# Patient Record
Sex: FEMALE | Race: WHITE | NOT HISPANIC OR LATINO | Employment: OTHER | URBAN - METROPOLITAN AREA
[De-identification: names, ages, dates, MRNs, and addresses within clinical notes are randomized per-mention and may not be internally consistent; named-entity substitution may affect disease eponyms.]

---

## 2017-02-08 RX ORDER — ALBUTEROL SULFATE 0.83 MG/ML
SOLUTION RESPIRATORY (INHALATION)
Qty: 540 ML | Refills: 1 | Status: SHIPPED | OUTPATIENT
Start: 2017-02-08

## 2017-02-22 ENCOUNTER — OFFICE VISIT (OUTPATIENT)
Dept: FAMILY MEDICINE | Facility: CLINIC | Age: 72
End: 2017-02-22
Payer: MEDICARE

## 2017-02-22 ENCOUNTER — LAB VISIT (OUTPATIENT)
Dept: LAB | Facility: HOSPITAL | Age: 72
End: 2017-02-22
Attending: FAMILY MEDICINE
Payer: MEDICARE

## 2017-02-22 VITALS
HEIGHT: 63 IN | SYSTOLIC BLOOD PRESSURE: 110 MMHG | BODY MASS INDEX: 32.54 KG/M2 | RESPIRATION RATE: 16 BRPM | TEMPERATURE: 98 F | DIASTOLIC BLOOD PRESSURE: 70 MMHG | HEART RATE: 46 BPM | WEIGHT: 183.63 LBS | OXYGEN SATURATION: 95 %

## 2017-02-22 DIAGNOSIS — I70.0 ATHEROSCLEROSIS OF AORTA: ICD-10-CM

## 2017-02-22 DIAGNOSIS — R73.9 ELEVATED BLOOD SUGAR: ICD-10-CM

## 2017-02-22 DIAGNOSIS — I25.10 CORONARY ARTERY DISEASE INVOLVING NATIVE CORONARY ARTERY WITHOUT ANGINA PECTORIS, UNSPECIFIED WHETHER NATIVE OR TRANSPLANTED HEART: Chronic | ICD-10-CM

## 2017-02-22 DIAGNOSIS — E78.5 HYPERLIPIDEMIA LDL GOAL <70: ICD-10-CM

## 2017-02-22 DIAGNOSIS — Z12.11 COLON CANCER SCREENING: ICD-10-CM

## 2017-02-22 DIAGNOSIS — G47.33 OSA (OBSTRUCTIVE SLEEP APNEA): Chronic | ICD-10-CM

## 2017-02-22 DIAGNOSIS — I25.2 OLD MYOCARDIAL INFARCTION: ICD-10-CM

## 2017-02-22 DIAGNOSIS — Z00.00 ENCOUNTER FOR PREVENTIVE HEALTH EXAMINATION: Primary | ICD-10-CM

## 2017-02-22 DIAGNOSIS — J44.9 CHRONIC OBSTRUCTIVE PULMONARY DISEASE, UNSPECIFIED COPD TYPE: Chronic | ICD-10-CM

## 2017-02-22 DIAGNOSIS — Z85.3 HISTORY OF BREAST CANCER: ICD-10-CM

## 2017-02-22 DIAGNOSIS — I20.89 STABLE ANGINA: Chronic | ICD-10-CM

## 2017-02-22 DIAGNOSIS — I11.9 BENIGN HYPERTENSIVE HEART DISEASE WITHOUT HEART FAILURE: Chronic | ICD-10-CM

## 2017-02-22 DIAGNOSIS — R00.1 BRADYCARDIA: ICD-10-CM

## 2017-02-22 DIAGNOSIS — M94.9 DISORDER OF CARTILAGE: ICD-10-CM

## 2017-02-22 PROBLEM — R73.03 PREDIABETES: Status: ACTIVE | Noted: 2017-02-22

## 2017-02-22 PROCEDURE — 99499 UNLISTED E&M SERVICE: CPT | Mod: S$GLB,,, | Performed by: PHYSICIAN ASSISTANT

## 2017-02-22 PROCEDURE — G0439 PPPS, SUBSEQ VISIT: HCPCS | Mod: S$GLB,,, | Performed by: PHYSICIAN ASSISTANT

## 2017-02-22 PROCEDURE — 36415 COLL VENOUS BLD VENIPUNCTURE: CPT

## 2017-02-22 PROCEDURE — 99999 PR PBB SHADOW E&M-EST. PATIENT-LVL V: CPT | Mod: PBBFAC,,, | Performed by: PHYSICIAN ASSISTANT

## 2017-02-22 PROCEDURE — 80061 LIPID PANEL: CPT

## 2017-02-22 PROCEDURE — 83036 HEMOGLOBIN GLYCOSYLATED A1C: CPT

## 2017-02-22 NOTE — PROGRESS NOTES
"Ade Castellano presented for a  Medicare AWV and comprehensive Health Risk Assessment today. The following components were reviewed and updated:    · Medical history  · Family History  · Social history  · Allergies and Current Medications  · Health Risk Assessment  · Health Maintenance  · Care Team     ** See Completed Assessments for Annual Wellness Visit within the encounter summary.**       The following assessments were completed:  · Living Situation  · CAGE  · Depression Screening  · Timed Get Up and Go  · Whisper Test  · Cognitive Function Screening    ·   ·   · Nutrition Screening  · ADL Screening  · PAQ Screening    Vitals:    02/22/17 0917   BP: 110/70   Pulse: (!) 46   Resp: 16   Temp: 97.7 °F (36.5 °C)   TempSrc: Oral   SpO2: 95%   Weight: 83.3 kg (183 lb 10.3 oz)   Height: 5' 3" (1.6 m)     Body mass index is 32.53 kg/(m^2).  Physical Exam      Diagnoses and health risks identified today and associated recommendations/orders:    Problem List Items Addressed This Visit     Stable angina (Chronic)    Overview     HRA 2014 nitro SL prn  On Imdur daily.  [facility]          Current Assessment & Plan     The current medical regimen is effective;  continue present plan and medications.  Continue to see Dr. Ortiz as advised.          SUNITHA (obstructive sleep apnea) (Chronic)    Overview     intolerant to mask         Current Assessment & Plan     Did not like her CPAP.   Still sees Dr. Sterling mari. Seeing her 3/6/17         Old myocardial infarction    Overview     HRA 2014         Hyperlipidemia LDL goal <70 (Chronic)    Overview     HRA 2014         Current Assessment & Plan     Due for labs today.   The current medical regimen is effective;  continue present plan and medications.           Relevant Orders    Lipid panel    History of breast cancer    Current Assessment & Plan     Continue to follow up with Dr. Holliday.  Not due for mammo until May.          COPD (chronic obstructive " pulmonary disease) (Chronic)    Overview     Dr. Katz         Current Assessment & Plan     Using ventolin twice a day sometimes.   Started sprivia recently but did not know she was to use it daily. She is aware now and will start to do so. Advised her we do not want her to use her ventolin that often.  She has a follow up with Dr. Katz 3/6/17         CAD (coronary artery disease) (Chronic)    Overview     Dr. Ortiz         Current Assessment & Plan     The current medical regimen is effective;  continue present plan and medications.  Continue to follow up with Dr. Ortiz as directed.  States she is down to half the Imdur and does not use her nitro.          Benign hypertensive heart disease without heart failure (Chronic)    Overview     Dr. Ortiz is Cardiologist          Current Assessment & Plan     The current medical regimen is effective;  continue present plan and medications.         Atherosclerosis of aorta    Overview     CT scan 4/14/13         Current Assessment & Plan     Lipids controlled.   Repeat labs today.   The current medical regimen is effective;  continue present plan and medications.             Other Visit Diagnoses     Encounter for preventive health examination    -  Primary    Elevated blood sugar        Relevant Orders    Hemoglobin A1c    Disorder of cartilage        Relevant Orders    DXA Bone Density Spine And Hip_Axial Skeleton    Colon cancer screening        Relevant Orders    Case request GI: COLONOSCOPY (Completed)    Bradycardia        seen by Dr. Ortiz today who did an EKG and kept her on the same regiment.   Follow up scheduled for May.           Provided Ade with a 5-10 year written screening schedule and personal prevention plan. Recommendations were developed using the USPSTF age appropriate recommendations. Education, counseling, and referrals were provided as needed. After Visit Summary printed and given to patient which includes a list of additional screenings\tests  needed.      Script for zoster given.  ROR to get Dr. Katz info on pneumonia shots.     No Follow-up on file.    OBDULIO Jay

## 2017-02-22 NOTE — MR AVS SNAPSHOT
[facility]  [address]  [address]  [place] [address]  Phone: [phone]  Fax: [phone]                  Ade Castellano   2017 10:00 AM   Office Visit    Description:  Female : 1945   Provider:  OBDULIO Javier   Department:  [facility]           Reason for Visit     Health Risk Assessment           Diagnoses this Visit        Comments    Disorder of cartilage    -  Primary     Prediabetes         Elevated blood sugar         Colon cancer screening         Encounter for preventive health examination                To Do List           Future Appointments        Provider Department Dept Phone    3/8/2017 9:20 AM [facility] DEXA1 [facility] [phone]      Goals (5 Years of Data)     None      [facility]     [facility] Care Line -  Assistance  Registered nurses in the [facility] provide clinical advisement, health education, appointment booking, and other advisory services.  Call for this free service at [phone].             Medications           Message regarding Medications     Verify the changes and/or additions to your medication regime listed below are the same as discussed with your clinician today.  If any of these changes or additions are incorrect, please notify your healthcare provider.             Verify that the below list of medications is an accurate representation of the medications you are currently taking.  If none reported, the list may be blank. If incorrect, please contact your healthcare provider. Carry this list with you in case of emergency.           Current Medications     albuterol (PROVENTIL) 2.5 mg /3 mL (0.083 %) nebulizer solution NEBULIZE 1 vial EVERY 6 HOURS AS NEEDED FOR WHEEZING    albuterol (VENTOLIN HFA) 90 mcg/actuation inhaler INHALE 2 PUFF(S) BY MOUTH EVERY 4 - 6 HOURS AS NEEDED FOR SHORTNESS OF BREATH or WHEEZING    ascorbic acid (VITAMIN C) 500 MG  "tablet Take 500 mg by mouth once daily.    aspirin (ECOTRIN) 325 MG EC tablet Take 325 mg by mouth once daily.    cyanocobalamin (VITAMIN B-12) 1000 MCG tablet Take 100 mcg by mouth once daily.    fish oil-fat acid comb.[id number] 1,200 mg Cap Take 1 tablet by mouth once daily.    isosorbide mononitrate (IMDUR) 60 MG 24 hr tablet     metoprolol tartrate (LOPRESSOR) 25 MG tablet Take 25 mg by mouth 2 (two) times daily.     nitroGLYCERIN (NITROSTAT) 0.4 MG SL tablet 1 TABLET(S) SUBLINGUALLY AS NEEDED NO MORE THAN 3 IN 15 MINUTES    rosuvastatin (CRESTOR) 20 MG tablet Take 1 tablet (20 mg total) by mouth once daily.    SPIRIVA RESPIMAT 2.5 mcg/actuation Mist Inhale 1 puff into the lungs once daily.    vitamin D 1000 units Tab Take 1,000 mg by mouth once daily.           Clinical Reference Information           Your Vitals Were     BP Pulse Temp Resp Height Weight    110/70 46 97.7 °F (36.5 °C) (Oral) 16 5' 3" (1.6 m) 83.3 kg (183 lb 10.3 oz)    SpO2 BMI             95% 32.53 kg/m2         Blood Pressure          Most Recent Value    BP  110/70      Allergies as of 2/22/2017     No Known Allergies      Immunizations Administered on Date of Encounter - 2/22/2017     None      Orders Placed During Today's Visit      Normal Orders This Visit    Case request GI: COLONOSCOPY     Future Labs/Procedures Expected by Expires    DXA Bone Density Spine And Hip_Axial Skeleton  2/22/2017 2/23/2018    Hemoglobin A1c  2/22/2017 2/22/2018      [facility] Sign-Up     Activating your [facility] account is as easy as 1-2-3!     1) Visit [email or web address], select Sign Up Now, enter this activation code and your date of birth, then select Next.  [email or web address]  Expires: 4/8/2017 10:19 AM      2) Create a username and password to use when you visit [facility] in the future and select a security question in case you lose your password and select Next.    3) Enter your e-mail address and click Sign Up!    Additional Information  If you have " questions, please e-mail [email or web address] or call [phone] to talk to our [facility] staff. Remember, [employer] is NOT to be used for urgent needs. For medical emergencies, dial 911.         Instructions      Counseling and Referral of Other Preventative  (Italic type indicates deductible and co-insurance are waived)    Patient Name: Ade Castellano  Today's Date: 2/22/2017      SERVICE LIMITATIONS RECOMMENDATION    Vaccines    · Pneumococcal (once after 65)    · Influenza (annually)    · Hepatitis B (if medium/high risk)    · Prevnar 13      Hepatitis B medium/high risk factors:       - End-stage renal disease       - Hemophiliacs who received Factor VII or         IX concentrates       - Clients of institutions for the mentally             retarded       - Persons who live in the same house as          a HepB carrier       - Homosexual men       - Illicit injectable drug abusers     Pneumococcal: N/A states she got it at Dr. Katz her pulomonologist, a record of release was signed and will try to get copy of this     Influenza: Done, repeat in one year     Hepatitis B: N/A not needed     Prevnar 13: N/A getting records from lung doctor    Mammogram (biennial age 50-74)  Annually (age 40 or over)  Last done May, recommend to repeat every 1 year  years    Pap (up to age 70 and after 70 if unknown history or abnormal study last 10 years)    N/A 70 years old      The USPSTF recommends against screening for cervical cancer in women older than age 65 years who have had adequate prior screening and are not otherwise at high risk for cervical cancer.      Colorectal cancer screening (to age 75)    · Fecal occult blood test (annual)  · Flexible sigmoidoscopy (5y)  · Screening colonoscopy (10y)  · Barium enema   Scheduled, see appointments    Diabetes self-management training (no USPSTF recommendations)  Requires referral by treating physician for patient with diabetes or renal disease. 10 hours of initial DSMT  sessions of no less than 30 minutes each in a continuous 12-month period. 2 hours of follow-up DSMT in subsequent years.  Last done last year. due now, ordered today , recommend to repeat every 6   months    Bone mass measurements (age 65 & older, biennial)  Requires diagnosis related to osteoporosis or estrogen deficiency. Biennial benefit unless patient has history of long-term glucocorticoid  Scheduled, see appointments    Glaucoma screening (no USPSTF recommendation)  Diabetes mellitus, family history   , age 50 or over    American, age 65 or over  Done this year, repeat every year    Medical nutrition therapy for diabetes or renal disease (no recommended schedule)  Requires referral by treating physician for patient with diabetes or renal disease or kidney transplant within the past 3 years.  Can be provided in same year as diabetes self-management training (DSMT), and CMS recommends medical nutrition therapy take place after DSMT. Up to 3 hours for initial year and 2 hours in subsequent years.  N/A wait for A1C    Cardiovascular screening blood tests (every 5 years)  · Fasting lipid panel  Order as a panel if possible  Done this year, repeat every year    Diabetes screening tests (at least every 3 years, Medicare covers annually or at 6-month intervals for prediabetic patients)  · Fasting blood sugar (FBS) or glucose tolerance test (GTT)  Patient must be diagnosed with one of the following:       - Hypertension       - Dyslipidemia       - Obesity (BMI 30kg/m2)       - Previous elevated impaired FBS or GTT       ... or any two of the following:       - Overweight (BMI 25 but <30)       - Family history of diabetes       - Age 65 or older       - History of gestational diabetes or birth of baby weighing more than 9 pounds  Scheduled, see appointments    Abdominal aortic aneurysm screening (once)  · Sonogram   Limited to patients who meet one of the following criteria:       - Men who are  65-75 years old and have smoked more than 100 cigarette in their lifetime       - Anyone with a family history of abdominal aortic aneurysm       - Anyone recommended for screening by the USPSTF  N/A not needed    HIV screening (annually for increased risk patients)  · HIV-1 and HIV-2 by EIA, or GRISELDA, rapid antibody test or oral mucosa transudate  Patients must be at increased risk for HIV infection per USPSTF guidelines or pregnant. Tests covered annually for patient at increased risk or as requested by the patient. Pregnant patients may receive up to 3 tests during pregnancy.  Risks discussed, screening is not recommended    Smoking cessation counseling (up to 8 sessions per year)  Patients must be asymptomatic of tobacco-related conditions to receive as a preventative service.  no longer a smoker    Subsequent annual wellness visit  At least 12 months since last AWV  Return in one year     The following information is provided to all patients.  This information is to help you find resources for any of the problems found today that may be affecting your health:                Living healthy guide: www.[facility].[place].[facility]      Understanding Diabetes: www.diabetes.org      Eating healthy: www.cdc.gov/healthyweight      [facility] home safety checklist: www.cdc.gov/steadi/patient.html      Agency on Aging: www.goea.[place].[facility]      Alcoholics anonymous (AA): www.aa.org      Physical Activity: www.anca.nih.gov/fd4bkgk      Tobacco use: www.quitwithusla.org          Language Assistance Services     ATTENTION: Language assistance services are available, free of charge. Please call [phone].      ATENCIÓN: Si habla español, tiene a montgomery disposición servicios gratuitos de asistencia lingüística. Llame al [phone].     ANGELITO Ý: N?u b?n nói Ti?ng Vi?t, có các d?ch v? h? tr? ngôn ng? mi?n phí dành cho b?n. G?i s? [phone].         [place] [facility] complies with applicable Federal civil rights laws and  does not discriminate on the basis of race, color, national origin, age, disability, or sex.

## 2017-02-22 NOTE — ASSESSMENT & PLAN NOTE
Lipids controlled.   Repeat labs today.   The current medical regimen is effective;  continue present plan and medications.

## 2017-02-22 NOTE — ASSESSMENT & PLAN NOTE
The current medical regimen is effective;  continue present plan and medications.  Continue to follow up with Dr. Ortiz as directed.  States she is down to half the Imdur and does not use her nitro.

## 2017-02-22 NOTE — ASSESSMENT & PLAN NOTE
Due for labs today.   The current medical regimen is effective;  continue present plan and medications.

## 2017-02-22 NOTE — PATIENT INSTRUCTIONS
Counseling and Referral of Other Preventative  (Italic type indicates deductible and co-insurance are waived)    Patient Name: Ade Castellano  Today's Date: 2/22/2017      SERVICE LIMITATIONS RECOMMENDATION    Vaccines    · Pneumococcal (once after 65)    · Influenza (annually)    · Hepatitis B (if medium/high risk)    · Prevnar 13      Hepatitis B medium/high risk factors:       - End-stage renal disease       - Hemophiliacs who received Factor VII or         IX concentrates       - Clients of institutions for the mentally             retarded       - Persons who live in the same house as          a HepB carrier       - Homosexual men       - Illicit injectable drug abusers     Pneumococcal: N/A states she got it at Dr. Katz her pulomonologist, a record of release was signed and will try to get copy of this     Influenza: Done, repeat in one year     Hepatitis B: N/A not needed     Prevnar 13: N/A getting records from lung doctor    Mammogram (biennial age 50-74)  Annually (age 40 or over)  Last done May, recommend to repeat every 1 year  years    Pap (up to age 70 and after 70 if unknown history or abnormal study last 10 years)    N/A 70 years old      The USPSTF recommends against screening for cervical cancer in women older than age 65 years who have had adequate prior screening and are not otherwise at high risk for cervical cancer.      Colorectal cancer screening (to age 75)    · Fecal occult blood test (annual)  · Flexible sigmoidoscopy (5y)  · Screening colonoscopy (10y)  · Barium enema   Scheduled, see appointments    Diabetes self-management training (no USPSTF recommendations)  Requires referral by treating physician for patient with diabetes or renal disease. 10 hours of initial DSMT sessions of no less than 30 minutes each in a continuous 12-month period. 2 hours of follow-up DSMT in subsequent years.  Last done last year. due now, ordered today , recommend to repeat every 6   months    Bone mass  measurements (age 65 & older, biennial)  Requires diagnosis related to osteoporosis or estrogen deficiency. Biennial benefit unless patient has history of long-term glucocorticoid  Scheduled, see appointments    Glaucoma screening (no USPSTF recommendation)  Diabetes mellitus, family history   , age 50 or over    American, age 65 or over  Done this year, repeat every year    Medical nutrition therapy for diabetes or renal disease (no recommended schedule)  Requires referral by treating physician for patient with diabetes or renal disease or kidney transplant within the past 3 years.  Can be provided in same year as diabetes self-management training (DSMT), and CMS recommends medical nutrition therapy take place after DSMT. Up to 3 hours for initial year and 2 hours in subsequent years.  N/A wait for A1C    Cardiovascular screening blood tests (every 5 years)  · Fasting lipid panel  Order as a panel if possible  Done this year, repeat every year    Diabetes screening tests (at least every 3 years, Medicare covers annually or at 6-month intervals for prediabetic patients)  · Fasting blood sugar (FBS) or glucose tolerance test (GTT)  Patient must be diagnosed with one of the following:       - Hypertension       - Dyslipidemia       - Obesity (BMI 30kg/m2)       - Previous elevated impaired FBS or GTT       ... or any two of the following:       - Overweight (BMI 25 but <30)       - Family history of diabetes       - Age 65 or older       - History of gestational diabetes or birth of baby weighing more than 9 pounds  Scheduled, see appointments    Abdominal aortic aneurysm screening (once)  · Sonogram   Limited to patients who meet one of the following criteria:       - Men who are 65-75 years old and have smoked more than 100 cigarette in their lifetime       - Anyone with a family history of abdominal aortic aneurysm       - Anyone recommended for screening by the USPSTF  N/A not needed    HIV  screening (annually for increased risk patients)  · HIV-1 and HIV-2 by EIA, or GRISELDA, rapid antibody test or oral mucosa transudate  Patients must be at increased risk for HIV infection per USPSTF guidelines or pregnant. Tests covered annually for patient at increased risk or as requested by the patient. Pregnant patients may receive up to 3 tests during pregnancy.  Risks discussed, screening is not recommended    Smoking cessation counseling (up to 8 sessions per year)  Patients must be asymptomatic of tobacco-related conditions to receive as a preventative service.  no longer a smoker    Subsequent annual wellness visit  At least 12 months since last AWV  Return in one year     The following information is provided to all patients.  This information is to help you find resources for any of the problems found today that may be affecting your health:                Living healthy guide: www.[facility].[place].[facility]      Understanding Diabetes: www.diabetes.org      Eating healthy: www.cdc.gov/healthyweight      [facility] home safety checklist: www.cdc.gov/steadi/patient.html      Agency on Aging: www.goea.[place].gov      Alcoholics anonymous (AA): www.aa.org      Physical Activity: www.anca.nih.gov/ix7kjjt      Tobacco use: www.quitwithusla.org

## 2017-02-22 NOTE — ASSESSMENT & PLAN NOTE
The current medical regimen is effective;  continue present plan and medications.  Continue to see Dr. Ortiz as advised.

## 2017-02-23 LAB
ESTIMATED AVG GLUCOSE: 137 MG/DL
HBA1C MFR BLD HPLC: 6.4 %

## 2017-03-02 ENCOUNTER — TELEPHONE (OUTPATIENT)
Dept: FAMILY MEDICINE | Facility: CLINIC | Age: 72
End: 2017-03-02

## 2017-03-02 NOTE — TELEPHONE ENCOUNTER
----- Message from Karen Mart sent at 3/2/2017  8:57 AM CST -----  Contact: self  [phone]  Pt is requesting to speak to you regarding a colonoscopy . Pls call pt [phone]. Thanks.......Laura

## 2017-03-03 NOTE — TELEPHONE ENCOUNTER
Patient returned call wanting to know if there was somewhere she can do the colonoscopy beside the hospital, because she has to pay 200.00 copay. Patient given Colonoscopy  department's number to contact.

## 2017-03-08 ENCOUNTER — HOSPITAL ENCOUNTER (OUTPATIENT)
Dept: RADIOLOGY | Facility: HOSPITAL | Age: 72
Discharge: HOME | End: 2017-03-08
Attending: FAMILY MEDICINE
Payer: MEDICARE

## 2017-03-08 DIAGNOSIS — M94.9 DISORDER OF CARTILAGE: ICD-10-CM

## 2017-03-08 PROCEDURE — 77080 DXA BONE DENSITY AXIAL: CPT | Mod: 26,,, | Performed by: RADIOLOGY

## 2017-03-08 PROCEDURE — 77080 DXA BONE DENSITY AXIAL: CPT | Mod: TC

## 2017-03-16 ENCOUNTER — ANESTHESIA EVENT (OUTPATIENT)
Dept: ENDOSCOPY | Facility: HOSPITAL | Age: 72
End: 2017-03-16
Payer: MEDICARE

## 2017-03-17 ENCOUNTER — ANESTHESIA (OUTPATIENT)
Dept: ENDOSCOPY | Facility: HOSPITAL | Age: 72
End: 2017-03-17
Payer: MEDICARE

## 2017-03-17 ENCOUNTER — SURGERY (OUTPATIENT)
Age: 72
End: 2017-03-17

## 2017-03-17 ENCOUNTER — HOSPITAL ENCOUNTER (OUTPATIENT)
Facility: HOSPITAL | Age: 72
Discharge: HOME | End: 2017-03-17
Attending: INTERNAL MEDICINE | Admitting: INTERNAL MEDICINE
Payer: MEDICARE

## 2017-03-17 VITALS
WEIGHT: 180 LBS | TEMPERATURE: 97 F | HEART RATE: 68 BPM | DIASTOLIC BLOOD PRESSURE: 60 MMHG | BODY MASS INDEX: 33.13 KG/M2 | SYSTOLIC BLOOD PRESSURE: 112 MMHG | HEIGHT: 62 IN | RESPIRATION RATE: 18 BRPM | OXYGEN SATURATION: 98 %

## 2017-03-17 DIAGNOSIS — Z12.11 SCREENING FOR COLON CANCER: ICD-10-CM

## 2017-03-17 PROCEDURE — 25000003 PHARM REV CODE 250: Performed by: ANESTHESIOLOGY

## 2017-03-17 PROCEDURE — 37000008 HC ANESTHESIA 1ST 15 MINUTES: Performed by: INTERNAL MEDICINE

## 2017-03-17 PROCEDURE — 45381 COLONOSCOPY SUBMUCOUS NJX: CPT | Performed by: INTERNAL MEDICINE

## 2017-03-17 PROCEDURE — 25000003 PHARM REV CODE 250

## 2017-03-17 PROCEDURE — D9220A PRA ANESTHESIA: Mod: PT,CRNA,, | Performed by: NURSE ANESTHETIST, CERTIFIED REGISTERED

## 2017-03-17 PROCEDURE — 27201089 HC SNARE, DISP (ANY): Performed by: INTERNAL MEDICINE

## 2017-03-17 PROCEDURE — 63600175 PHARM REV CODE 636 W HCPCS

## 2017-03-17 PROCEDURE — 37000009 HC ANESTHESIA EA ADD 15 MINS: Performed by: INTERNAL MEDICINE

## 2017-03-17 PROCEDURE — 45390 COLONOSCOPY W/RESECTION: CPT | Performed by: INTERNAL MEDICINE

## 2017-03-17 PROCEDURE — D9220A PRA ANESTHESIA: Mod: PT,ANES,, | Performed by: ANESTHESIOLOGY

## 2017-03-17 PROCEDURE — 27201028 HC NEEDLE, SCLERO: Performed by: INTERNAL MEDICINE

## 2017-03-17 PROCEDURE — 88305 TISSUE EXAM BY PATHOLOGIST: CPT | Mod: 26,,, | Performed by: PATHOLOGY

## 2017-03-17 PROCEDURE — 45385 COLONOSCOPY W/LESION REMOVAL: CPT | Performed by: INTERNAL MEDICINE

## 2017-03-17 PROCEDURE — 88305 TISSUE EXAM BY PATHOLOGIST: CPT | Performed by: PATHOLOGY

## 2017-03-17 PROCEDURE — 25000003 PHARM REV CODE 250: Performed by: NURSE ANESTHETIST, CERTIFIED REGISTERED

## 2017-03-17 RX ORDER — LIDOCAINE HYDROCHLORIDE 20 MG/ML
INJECTION, SOLUTION INFILTRATION; PERINEURAL
Status: COMPLETED
Start: 2017-03-17 | End: 2017-03-17

## 2017-03-17 RX ORDER — GLYCOPYRROLATE 0.2 MG/ML
INJECTION INTRAMUSCULAR; INTRAVENOUS
Status: DISCONTINUED | OUTPATIENT
Start: 2017-03-17 | End: 2017-03-17

## 2017-03-17 RX ORDER — LIDOCAINE HYDROCHLORIDE 10 MG/ML
1 INJECTION, SOLUTION EPIDURAL; INFILTRATION; INTRACAUDAL; PERINEURAL ONCE
Status: DISCONTINUED | OUTPATIENT
Start: 2017-03-17 | End: 2017-03-17 | Stop reason: HOSPADM

## 2017-03-17 RX ORDER — SODIUM CHLORIDE 9 MG/ML
INJECTION, SOLUTION INTRAVENOUS CONTINUOUS
Status: DISCONTINUED | OUTPATIENT
Start: 2017-03-17 | End: 2017-03-17 | Stop reason: HOSPADM

## 2017-03-17 RX ORDER — GLYCOPYRROLATE 0.2 MG/ML
INJECTION INTRAMUSCULAR; INTRAVENOUS
Status: DISCONTINUED
Start: 2017-03-17 | End: 2017-03-17 | Stop reason: HOSPADM

## 2017-03-17 RX ORDER — PROPOFOL 10 MG/ML
VIAL (ML) INTRAVENOUS
Status: COMPLETED
Start: 2017-03-17 | End: 2017-03-17

## 2017-03-17 RX ADMIN — SODIUM CHLORIDE: 0.9 INJECTION, SOLUTION INTRAVENOUS at 08:03

## 2017-03-17 RX ADMIN — PROPOFOL 20 MG: 10 INJECTION, EMULSION INTRAVENOUS at 09:03

## 2017-03-17 RX ADMIN — PROPOFOL 50 MG: 10 INJECTION, EMULSION INTRAVENOUS at 09:03

## 2017-03-17 RX ADMIN — LIDOCAINE HYDROCHLORIDE 100 MG: 20 INJECTION, SOLUTION INFILTRATION; PERINEURAL at 09:03

## 2017-03-17 RX ADMIN — PROPOFOL 30 MG: 10 INJECTION, EMULSION INTRAVENOUS at 09:03

## 2017-03-17 RX ADMIN — GLYCOPYRROLATE 0.2 MG: 0.2 INJECTION, SOLUTION INTRAMUSCULAR; INTRAVENOUS at 09:03

## 2017-03-17 NOTE — OR NURSING
Informed  about patient having chest pains 3 days ago.  Dr. Zepeda spoke with pt. No new orders given

## 2017-03-17 NOTE — ANESTHESIA POSTPROCEDURE EVALUATION
"Anesthesia Post Evaluation    Patient: Ade Castellano    Procedure(s) Performed: Procedure(s) (LRB):  COLONOSCOPY (N/A)    Final Anesthesia Type: general  Patient location during evaluation: PACU  Patient participation: Yes- Able to Participate  Level of consciousness: awake and alert and oriented  Post-procedure vital signs: reviewed and stable  Pain management: adequate  Airway patency: patent  PONV status at discharge: No PONV  Anesthetic complications: no      Cardiovascular status: blood pressure returned to baseline and hemodynamically stable  Respiratory status: unassisted and spontaneous ventilation  Hydration status: euvolemic  Follow-up not needed.        Visit Vitals    /60 (BP Location: Right arm, Patient Position: Lying, BP Method: Automatic)    Pulse 68    Temp 36.3 °C (97.3 °F) (Oral)    Resp 18    Ht 5' 2" (1.575 m)    Wt 81.6 kg (180 lb)    SpO2 98%    Breastfeeding No    BMI 32.92 kg/m2       Pain/Sergio Score: Pain Assessment Performed: Yes (3/17/2017 10:05 AM)  Presence of Pain: denies (3/17/2017 10:05 AM)  Sergio Score: 10 (3/17/2017 10:05 AM)      "

## 2017-03-17 NOTE — IP AVS SNAPSHOT
[place] [address] [place] [address]  Phone: [phone]           Patient Discharge Instructions     Our goal is to set you up for success. This packet includes information on your condition, medications, and your home care. It will help you to care for yourself so you don't get sicker and need to go back to the hospital.     Please ask your nurse if you have any questions.        There are many details to remember when preparing to leave the hospital. Here is what you will need to do:    1. Take your medicine. If you are prescribed medications, review your Medication List in the following pages. You may have new medications to  at the pharmacy and others that you'll need to stop taking. Review the instructions for how and when to take your medications. Talk with your doctor or nurses if you are unsure of what to do.     2. Go to your follow-up appointments. Specific follow-up information is listed in the following pages. Your may be contacted by a transition nurse or clinical provider about future appointments. Be sure we have all of the phone numbers to reach you, if needed. Please contact your provider's office if you are unable to make an appointment.     3. Watch for warning signs. Your doctor or nurse will give you detailed warning signs to watch for and when to call for assistance. These instructions may also include educational information about your condition. If you experience any of warning signs to your health, call your doctor.               ** Verify the list of medication(s) below is accurate and up to date. Carry this with you in case of emergency. If your medications have changed, please notify your healthcare provider.             Medication List      CHANGE how you take these medications        Additional Info                      rosuvastatin 20 MG tablet   Commonly known as:  CRESTOR   Quantity:  90 tablet   Refills:  1   Dose:  20 mg   What changed:   how much to take    Instructions:  Take 1 tablet (20 mg total) by mouth once daily.     Begin Date    AM    Noon    PM    Bedtime         CONTINUE taking these medications        Additional Info                      * albuterol 90 mcg/actuation inhaler   Commonly known as:  VENTOLIN HFA   Quantity:  18 g   Refills:  5    Instructions:  INHALE 2 PUFF(S) BY MOUTH EVERY 4 - 6 HOURS AS NEEDED FOR SHORTNESS OF BREATH or WHEEZING     Begin Date    AM    Noon    PM    Bedtime       * albuterol 2.5 mg /3 mL (0.083 %) nebulizer solution   Commonly known as:  PROVENTIL   Quantity:  540 mL   Refills:  1    Instructions:  NEBULIZE 1 vial EVERY 6 HOURS AS NEEDED FOR WHEEZING     Begin Date    AM    Noon    PM    Bedtime       aspirin 325 MG EC tablet   Commonly known as:  ECOTRIN   Refills:  0   Dose:  325 mg    Instructions:  Take 325 mg by mouth once daily.     Begin Date    AM    Noon    PM    Bedtime       cyanocobalamin 1000 MCG tablet   Commonly known as:  VITAMIN B-12   Refills:  0   Dose:  100 mcg    Instructions:  Take 100 mcg by mouth once daily.     Begin Date    AM    Noon    PM    Bedtime       fish oil-fat acid comb.8-hb137 1,200 mg (400 [id number]-406mv) Cap   Refills:  0   Dose:  1 tablet    Instructions:  Take 1 tablet by mouth once daily.     Begin Date    AM    Noon    PM    Bedtime       isosorbide mononitrate 60 MG 24 hr tablet   Commonly known as:  IMDUR   Refills:  0      Begin Date    AM    Noon    PM    Bedtime       metoprolol tartrate 25 MG tablet   Commonly known as:  LOPRESSOR   Refills:  0   Dose:  25 mg    Instructions:  Take 25 mg by mouth 2 (two) times daily.     Begin Date    AM    Noon    PM    Bedtime       nitroGLYCERIN 0.4 MG SL tablet   Commonly known as:  NITROSTAT   Quantity:  25 tablet   Refills:  1    Instructions:  1 TABLET(S) SUBLINGUALLY AS NEEDED NO MORE THAN 3 IN 15 MINUTES     Begin Date    AM    Noon    PM    Bedtime       polyethylene glycol 240-22.72-6.72 -5.84 gram Solr    Commonly known as:  COLYTE   Quantity:  1 Bottle   Refills:  0   Dose:  4 L    Instructions:  Take 4,000 mLs (4 L total) by mouth as needed.     Begin Date    AM    Noon    PM    Bedtime       SPIRIVA RESPIMAT 2.5 mcg/actuation Mist   Refills:  0   Dose:  1 puff   Generic drug:  tiotropium bromide    Instructions:  Inhale 1 puff into the lungs once daily.     Begin Date    AM    Noon    PM    Bedtime       VITAMIN C 500 MG tablet   Refills:  0   Dose:  500 mg   Generic drug:  ascorbic acid (vitamin C)    Instructions:  Take 500 mg by mouth once daily.     Begin Date    AM    Noon    PM    Bedtime       vitamin D 1000 units Tab   Refills:  0   Dose:  1000 mg    Instructions:  Take 1,000 mg by mouth once daily.     Begin Date    AM    Noon    PM    Bedtime       * Notice:  This list has 2 medication(s) that are the same as other medications prescribed for you. Read the directions carefully, and ask your doctor or other care provider to review them with you.               Please bring to all follow up appointments:    1. A copy of your discharge instructions.  2. All medicines you are currently taking in their original bottles.  3. Identification and insurance card.    Please arrive 15 minutes ahead of scheduled appointment time.    Please call 24 hours in advance if you must reschedule your appointment and/or time.          Discharge Instructions     Future Orders    Activity as tolerated     Call MD for:  difficulty breathing, headache or visual disturbances     Call MD for:  persistent nausea and vomiting     Call MD for:  severe uncontrolled pain     Call MD for:  temperature >100.4     Diet general     Questions:    Total calories:      Fat restriction, if any:      Protein restriction, if any:      Na restriction, if any:      Fluid restriction:      Additional restrictions:          Admission Information     Date & Time Provider Department [facility]    3/17/2017  8:06 AM Julio Rudd MD [facility]  "[id number]      Care Providers     Provider Role Specialty Primary office phone    Julio Rudd MD Attending Provider Gastroenterology [phone]    Julio Rudd MD Surgeon  Gastroenterology [phone]      Your Vitals Were     BP Pulse Temp Resp Height Weight    112/60 60 97.3 °F (36.3 °C) (Oral) 18 5' 2" (1.575 m) 81.6 kg (180 lb)    SpO2 BMI             98% 32.92 kg/m2         Recent Lab Values        12/30/2015 9/20/2016 2/22/2017                     9:45 AM 11:45 AM 10:30 AM         A1C 6.4 (H) 6.4 (H) 6.4 (H)         Comment for A1C at 11:45 AM on 9/20/2016:  According to ADA guidelines, hemoglobin A1C <7.0% represents  optimal control in non-pregnant diabetic patients.  Different  metrics may apply to specific populations.   Standards of Medical Care in Diabetes - 2016.  For the purpose of screening for the presence of diabetes:  <5.7%     Consistent with the absence of diabetes  5.7-6.4%  Consistent with increasing risk for diabetes   (prediabetes)  >or=6.5%  Consistent with diabetes  Currently no consensus exists for use of hemoglobin A1C  for diagnosis of diabetes for children.      Comment for A1C at 10:30 AM on 2/22/2017:  According to ADA guidelines, hemoglobin A1C <7.0% represents  optimal control in non-pregnant diabetic patients.  Different  metrics may apply to specific populations.   Standards of Medical Care in Diabetes - 2016.  For the purpose of screening for the presence of diabetes:  <5.7%     Consistent with the absence of diabetes  5.7-6.4%  Consistent with increasing risk for diabetes   (prediabetes)  >or=6.5%  Consistent with diabetes  Currently no consensus exists for use of hemoglobin A1C  for diagnosis of diabetes for children.        Pending Labs     Order Current Status    Specimen to Pathology - Surgery Collected (03/17/17 0917)      Allergies as of 3/17/2017     No Known Allergies      [facility]     [facility] Nurse Care Line - 24/7 Assistance  Unless otherwise directed " by your provider, please contact [facility], our nurse care line that is available for 24/7 assistance.     Registered nurses in the [facility] provide clinical advisement, health education, appointment booking, and other advisory services.  Call for this free service at [phone].        Advance Directives     An advance directive is a document which, in the event you are no longer able to make decisions for yourself, tells your healthcare team what kind of treatment you do or do not want to receive, or who you would like to make those decisions for you.  If you do not currently have an advance directive, [facility] encourages you to create one.  For more information call:  [phone]), [phone] ([phone]),  or log on to [email or web address].        Language Assistance Services     ATTENTION: Language assistance services are available, free of charge. Please call [phone].      ATENCIÓN: Si habla español, tiene a montgomery disposición servicios gratuitos de asistencia lingüística. Llame al [phone].     CHÚ Ý: N?u b?n nói Ti?ng Vi?t, có các d?ch v? h? tr? ngôn ng? mi?n phí dành cho b?n. G?i s? [phone].        [facility] Sign-Up     Activating your [facility] account is as easy as 1-2-3!     1) Visit [email or web address], select Sign Up Now, enter this activation code and your date of birth, then select Next.  [email or web address]  Expires: 4/8/2017 11:19 AM      2) Create a username and password to use when you visit [facility] in the future and select a security question in case you lose your password and select Next.    3) Enter your e-mail address and click Sign Up!    Additional Information  If you have questions, please e-mail [email or web address] or call [phone] to talk to our [facility] staff. Remember, [facility] is NOT to be used for urgent needs. For medical emergencies, dial 911.          [facility] complies with applicable Federal  civil rights laws and does not discriminate on the basis of race, color, national origin, age, disability, or sex.

## 2017-03-17 NOTE — TRANSFER OF CARE
"Anesthesia Transfer of Care Note    Patient: Ade Castellano    Procedure(s) Performed: Procedure(s) (LRB):  COLONOSCOPY (N/A)    Patient location: GI    Anesthesia Type: general    Transport from OR: Transported from OR on room air with adequate spontaneous ventilation    Post pain: adequate analgesia    Post assessment: no apparent anesthetic complications and tolerated procedure well    Post vital signs: stable    Level of consciousness: sedated and responds to stimulation    Nausea/Vomiting: no nausea/vomiting    Complications: none          Last vitals:   Visit Vitals    BP (!) 105/56 (BP Location: Right arm, Patient Position: Lying, BP Method: Automatic)    Pulse (!) 49    Temp 36.3 °C (97.3 °F) (Oral)    Resp 18    Ht 5' 2" (1.575 m)    Wt 81.6 kg (180 lb)    SpO2 98%    Breastfeeding No    BMI 32.92 kg/m2     "

## 2017-03-17 NOTE — H&P
Gastroenterology    CC: Hx polyps    HPI 71 y.o. female with hx of polyps      Past Medical History:   Diagnosis Date    Angina pectoris     Bell's palsy     left facial weakness    Breast cancer     RIGHT    CAD (coronary artery disease)     Cervical cancer     Chronic bronchitis     COPD (chronic obstructive pulmonary disease)     Dr. Katz    Dental bridge present     Emphysema of lung     History of heart artery stent     Dr. Ortiz  x2 stents    Hyperlipidemia     Hypertension     Myocardial infarction     SUNITHA (obstructive sleep apnea)     intolerant to mask    Pneumonia     PUD (peptic ulcer disease)     Sleep apnea          Review of Systems  General ROS: negative for chills, fever or weight loss  Cardiovascular ROS: no chest pain or dyspnea on exertion    Physical Examination  Breastfeeding? No  General appearance: alert, cooperative, no distress  HENT: Normocephalic, atraumatic, neck symmetrical, no nasal discharge   Eyes: conjunctivae/corneas clear, no icterus, EOM's intact  Lungs: resp non-labored  Heart: regular rate   Abdomen: soft, non-tender; bowel sounds normoactive; no organomegaly  Extremities: extremities symmetric; no clubbing, cyanosis, or edema  Neurologic: Alert and oriented X 3, normal strength, normal coordination and gait    Assessment:     Hx polyps    Plan:   Colonoscopy

## 2017-03-17 NOTE — ANESTHESIA PREPROCEDURE EVALUATION
"                                                                                                             03/17/2017  Ade Castellano is a 71 y.o., female.    [facility] Anesthesia Evaluation    I have reviewed the Patient Summary Reports.    I have reviewed the Nursing Notes.      Review of Systems  Social:  Former Smoker 150 pack yr hx   Hematology/Oncology:         -- Cancer in past history: Breast   Cardiovascular:   Exercise tolerance: poor Denies Pacemaker. Hypertension  Denies Valvular problems/Murmurs. Past MI CAD   CABG/stent (stents, years ago)  Angina CHF hyperlipidemia Pt saw Dr. Ortiz last week, no changes. She took nitro recently for something that she thinks was likely indigestion. She said it was chest discomfort that was not "deep" but felt like it was just under the skin. She denies chest tightness, SOB, diaphoresis, nausea, arm or jaw pain.    Her exercise tolerance is poor but stable   Pulmonary:   Denies Pneumonia COPD (2L home O2 prn), severe Sleep Apnea    Renal/:  Renal/ Normal     Hepatic/GI:   Bowel Prep. PUD, Denies Liver Disease. Denies Hepatitis.    Neurological:   Denies CVA. Denies Seizures.    Endocrine:  Endocrine Normal        Physical Exam  General:  Obesity    Airway/Jaw/Neck:   M3  Good mouth opening  Upper plate, lower bridge  Relatively free ROM at neck          Mental Status:  Mental Status Findings: Normal        Anesthesia Plan  Type of Anesthesia, risks & benefits discussed:  Anesthesia Type:  general  Patient's Preference:   Intra-op Monitoring Plan:   Intra-op Monitoring Plan Comments:   Post Op Pain Control Plan:   Post Op Pain Control Plan Comments:   Induction:   IV  Beta Blocker:  Patient is on a Beta-Blocker and has received one dose within the past 24 hours (No further documentation required).       Informed Consent: Patient understands risks and agrees with Anesthesia plan.  Questions answered. Anesthesia consent signed with patient.  ASA Score: 3     Day of " Surgery Review of History & Physical:    H&P update referred to the provider.     Anesthesia Plan Notes: npo        Ready For Surgery From Anesthesia Perspective.

## 2017-06-08 ENCOUNTER — TELEPHONE (OUTPATIENT)
Dept: FAMILY MEDICINE | Facility: CLINIC | Age: 72
End: 2017-06-08

## 2017-06-08 DIAGNOSIS — Z12.31 ENCOUNTER FOR SCREENING MAMMOGRAM FOR MALIGNANT NEOPLASM OF BREAST: Primary | ICD-10-CM

## 2017-06-08 NOTE — TELEPHONE ENCOUNTER
----- Message from Kat Tavares sent at 6/8/2017  2:11 PM CDT -----  Contact: SELF  Requesting an order & to schedule a mammogram at [facility] .       [phone]     LL

## 2017-06-09 NOTE — TELEPHONE ENCOUNTER
Spoke with patient/ schedule mammogram appointment for Monday at [facility]

## 2017-06-12 ENCOUNTER — HOSPITAL ENCOUNTER (OUTPATIENT)
Dept: RADIOLOGY | Facility: HOSPITAL | Age: 72
Discharge: HOME | End: 2017-06-12
Attending: NURSE PRACTITIONER
Payer: MEDICARE

## 2017-06-12 VITALS — BODY MASS INDEX: 33.1 KG/M2 | HEIGHT: 62 IN | WEIGHT: 179.88 LBS

## 2017-06-12 DIAGNOSIS — C50.911 MALIGNANT NEOPLASM OF RIGHT FEMALE BREAST: ICD-10-CM

## 2017-06-12 DIAGNOSIS — C50.919 BREAST CANCER: ICD-10-CM

## 2017-06-12 DIAGNOSIS — Z12.31 ENCOUNTER FOR SCREENING MAMMOGRAM FOR MALIGNANT NEOPLASM OF BREAST: ICD-10-CM

## 2017-06-12 PROCEDURE — 77066 DX MAMMO INCL CAD BI: CPT | Mod: TC

## 2017-06-12 PROCEDURE — 77066 DX MAMMO INCL CAD BI: CPT | Mod: 26,,, | Performed by: RADIOLOGY

## 2017-06-12 PROCEDURE — 77062 BREAST TOMOSYNTHESIS BI: CPT | Mod: 26,,, | Performed by: RADIOLOGY

## 2017-06-30 ENCOUNTER — SURGERY (OUTPATIENT)
Age: 72
End: 2017-06-30

## 2017-06-30 ENCOUNTER — ANESTHESIA (OUTPATIENT)
Dept: ENDOSCOPY | Facility: HOSPITAL | Age: 72
End: 2017-06-30
Payer: MEDICARE

## 2017-06-30 ENCOUNTER — ANESTHESIA EVENT (OUTPATIENT)
Dept: ENDOSCOPY | Facility: HOSPITAL | Age: 72
End: 2017-06-30
Payer: MEDICARE

## 2017-06-30 ENCOUNTER — HOSPITAL ENCOUNTER (OUTPATIENT)
Facility: HOSPITAL | Age: 72
Discharge: HOME | End: 2017-06-30
Attending: INTERNAL MEDICINE | Admitting: INTERNAL MEDICINE
Payer: MEDICARE

## 2017-06-30 VITALS
BODY MASS INDEX: 31.54 KG/M2 | OXYGEN SATURATION: 98 % | DIASTOLIC BLOOD PRESSURE: 54 MMHG | RESPIRATION RATE: 20 BRPM | WEIGHT: 178 LBS | SYSTOLIC BLOOD PRESSURE: 107 MMHG | TEMPERATURE: 98 F | HEART RATE: 58 BPM | HEIGHT: 63 IN

## 2017-06-30 DIAGNOSIS — Z86.010 PERSONAL HISTORY OF COLONIC POLYPS: ICD-10-CM

## 2017-06-30 PROBLEM — Z86.0100 PERSONAL HISTORY OF COLONIC POLYPS: Status: ACTIVE | Noted: 2017-06-30

## 2017-06-30 PROCEDURE — 45385 COLONOSCOPY W/LESION REMOVAL: CPT | Performed by: INTERNAL MEDICINE

## 2017-06-30 PROCEDURE — 88305 TISSUE EXAM BY PATHOLOGIST: CPT | Mod: 26,,, | Performed by: PATHOLOGY

## 2017-06-30 PROCEDURE — 37000008 HC ANESTHESIA 1ST 15 MINUTES: Performed by: INTERNAL MEDICINE

## 2017-06-30 PROCEDURE — D9220A PRA ANESTHESIA: Mod: ANES,,, | Performed by: ANESTHESIOLOGY

## 2017-06-30 PROCEDURE — 25000003 PHARM REV CODE 250: Performed by: ANESTHESIOLOGY

## 2017-06-30 PROCEDURE — 37000009 HC ANESTHESIA EA ADD 15 MINS: Performed by: INTERNAL MEDICINE

## 2017-06-30 PROCEDURE — D9220A PRA ANESTHESIA: Mod: CRNA,,, | Performed by: NURSE ANESTHETIST, CERTIFIED REGISTERED

## 2017-06-30 PROCEDURE — 27201089 HC SNARE, DISP (ANY): Performed by: INTERNAL MEDICINE

## 2017-06-30 PROCEDURE — 25000003 PHARM REV CODE 250: Performed by: NURSE ANESTHETIST, CERTIFIED REGISTERED

## 2017-06-30 PROCEDURE — 63600175 PHARM REV CODE 636 W HCPCS: Performed by: NURSE ANESTHETIST, CERTIFIED REGISTERED

## 2017-06-30 PROCEDURE — 88305 TISSUE EXAM BY PATHOLOGIST: CPT | Performed by: PATHOLOGY

## 2017-06-30 RX ORDER — LIDOCAINE HCL/PF 100 MG/5ML
SYRINGE (ML) INTRAVENOUS
Status: DISCONTINUED | OUTPATIENT
Start: 2017-06-30 | End: 2017-06-30

## 2017-06-30 RX ORDER — SODIUM CHLORIDE 9 MG/ML
INJECTION, SOLUTION INTRAVENOUS CONTINUOUS
Status: DISCONTINUED | OUTPATIENT
Start: 2017-06-30 | End: 2017-06-30 | Stop reason: HOSPADM

## 2017-06-30 RX ORDER — LIDOCAINE HYDROCHLORIDE 20 MG/ML
INJECTION, SOLUTION EPIDURAL; INFILTRATION; INTRACAUDAL; PERINEURAL
Status: DISCONTINUED
Start: 2017-06-30 | End: 2017-06-30 | Stop reason: HOSPADM

## 2017-06-30 RX ORDER — LIDOCAINE HYDROCHLORIDE 10 MG/ML
1 INJECTION, SOLUTION EPIDURAL; INFILTRATION; INTRACAUDAL; PERINEURAL ONCE
Status: CANCELLED | OUTPATIENT
Start: 2017-06-30 | End: 2017-06-30

## 2017-06-30 RX ORDER — PROPOFOL 10 MG/ML
VIAL (ML) INTRAVENOUS
Status: DISCONTINUED
Start: 2017-06-30 | End: 2017-06-30 | Stop reason: HOSPADM

## 2017-06-30 RX ORDER — PROPOFOL 10 MG/ML
VIAL (ML) INTRAVENOUS
Status: DISCONTINUED | OUTPATIENT
Start: 2017-06-30 | End: 2017-06-30

## 2017-06-30 RX ADMIN — PROPOFOL 50 MG: 10 INJECTION, EMULSION INTRAVENOUS at 11:06

## 2017-06-30 RX ADMIN — SODIUM CHLORIDE: 0.9 INJECTION, SOLUTION INTRAVENOUS at 10:06

## 2017-06-30 RX ADMIN — LIDOCAINE HYDROCHLORIDE 5 ML: 20 INJECTION, SOLUTION INTRAVENOUS at 11:06

## 2017-06-30 RX ADMIN — PROPOFOL 100 MG: 10 INJECTION, EMULSION INTRAVENOUS at 11:06

## 2017-06-30 NOTE — ANESTHESIA PREPROCEDURE EVALUATION
"                                                                                                             06/30/2017  Ade Castellano is a 71 y.o., female.    Pre-op Assessment    I have reviewed the Patient Summary Reports.     I have reviewed the Nursing Notes.      Review of Systems  Social:  Former Smoker 150 pack yr hx   Hematology/Oncology:         -- Cancer in past history: Breast   Cardiovascular:   Exercise tolerance: poor Denies Pacemaker. Hypertension  Denies Valvular problems/Murmurs. Past MI CAD   CABG/stent (stents, years ago)  Angina CHF hyperlipidemia Pt saw Dr. Ortiz last week, no changes. She took nitro recently for something that she thinks was likely indigestion. She said it was chest discomfort that was not "deep" but felt like it was just under the skin. She denies chest tightness, SOB, diaphoresis, nausea, arm or jaw pain.    Her exercise tolerance is poor but stable   Pulmonary:   Denies Pneumonia COPD (2L home O2 prn), severe Sleep Apnea    Renal/:  Renal/ Normal     Hepatic/GI:   Bowel Prep. PUD, Denies Liver Disease. Denies Hepatitis.    Neurological:   Denies CVA. Denies Seizures.    Endocrine:  Endocrine Normal        Physical Exam  General:  Obesity    Airway/Jaw/Neck:   M3  Good mouth opening  Upper plate, lower bridge  Relatively free ROM at neck          Mental Status:  Mental Status Findings: Normal        Anesthesia Plan  Type of Anesthesia, risks & benefits discussed:  Anesthesia Type:  general  Patient's Preference:   Intra-op Monitoring Plan:   Intra-op Monitoring Plan Comments:   Post Op Pain Control Plan:   Post Op Pain Control Plan Comments:   Induction:   IV  Beta Blocker:  Patient is on a Beta-Blocker and has received one dose within the past 24 hours (No further documentation required).       Informed Consent: Patient understands risks and agrees with Anesthesia plan.  Questions answered. Anesthesia consent signed with patient.  ASA Score: 3     Day of Surgery " Review of History & Physical:    H&P update referred to the provider.     Anesthesia Plan Notes: npo        Ready For Surgery From Anesthesia Perspective.

## 2017-06-30 NOTE — PROGRESS NOTES
Patient awake alert. Passing flatus. MD spoke with patient and niece. Verbalized understanding. Ambulated to restroom. DC instruction given and discussed.

## 2017-06-30 NOTE — ANESTHESIA POSTPROCEDURE EVALUATION
"Anesthesia Post Evaluation    Patient: Ade Castellano    Procedure(s) Performed: Procedure(s) (LRB):  COLONOSCOPY (N/A)    Final Anesthesia Type: general  Patient location during evaluation: GI PACU  Patient participation: Yes- Able to Participate  Level of consciousness: awake and alert  Post-procedure vital signs: reviewed and stable  Pain management: adequate  Airway patency: patent  PONV status at discharge: No PONV  Anesthetic complications: no      Cardiovascular status: blood pressure returned to baseline  Respiratory status: unassisted  Hydration status: euvolemic  Follow-up not needed.        Visit Vitals  BP (!) 107/54 (BP Location: Right arm, Patient Position: Lying, BP Method: Automatic)   Pulse (!) 58   Temp 36.5 °C (97.7 °F) (Oral)   Resp 20   Ht 5' 3" (1.6 m)   Wt 80.7 kg (178 lb)   LMP  (LMP Unknown)   SpO2 98%   Breastfeeding? No   BMI 31.53 kg/m²       Pain/Sergio Score: Pain Assessment Performed: Yes (6/30/2017 11:36 AM)  Presence of Pain: denies (6/30/2017 11:36 AM)  Sergio Score: 10 (6/30/2017 11:36 AM)      "

## 2017-06-30 NOTE — H&P
"Gastroenterology    CC: Hx polyps    HPI 71 y.o. female here for surviellance      Past Medical History:   Diagnosis Date    Angina pectoris     Bell's palsy     left facial weakness    Breast cancer     RIGHT    CAD (coronary artery disease)     Cervical cancer     Chronic bronchitis     COPD (chronic obstructive pulmonary disease)     Dr. Katz    Dental bridge present     Emphysema of lung     History of heart artery stent     Dr. Ortiz  x2 stents    Hyperlipidemia     Hypertension     Myocardial infarction     SUNITHA (obstructive sleep apnea)     intolerant to mask    Pneumonia     PUD (peptic ulcer disease)     Sleep apnea          Review of Systems  General ROS: negative for chills, fever or weight loss  Cardiovascular ROS: no chest pain or dyspnea on exertion    Physical Examination  BP 99/63 (BP Location: Left arm, Patient Position: Lying, BP Method: Automatic)   Pulse (!) 56   Temp 97.6 °F (36.4 °C) (Oral)   Resp 18   Ht 5' 3" (1.6 m)   Wt 80.7 kg (178 lb)   LMP  (LMP Unknown)   SpO2 95%   Breastfeeding? No   BMI 31.53 kg/m²   General appearance: alert, cooperative, no distress  HENT: Normocephalic, atraumatic, neck symmetrical, no nasal discharge   Eyes: conjunctivae/corneas clear, no icterus, EOM's intact  Lungs: resp non-labored  Heart: regular rate   Abdomen: soft, non-tender; bowel sounds normoactive; no organomegaly  Extremities: extremities symmetric; no clubbing, cyanosis, or edema  Neurologic: Alert and oriented X 3, normal strength, normal coordination and gait    Assessment:     Hx polyps    Plan:   Colonoscopy      "

## 2017-06-30 NOTE — TRANSFER OF CARE
"Anesthesia Transfer of Care Note    Patient: Ade Castellano    Procedure(s) Performed: Procedure(s) (LRB):  COLONOSCOPY (N/A)    Patient location: PACU    Anesthesia Type: general    Transport from OR: Transported from OR on room air with adequate spontaneous ventilation. Transported from OR on 2-3 L/min O2 by NC with adequate spontaneous ventilation    Post pain: adequate analgesia    Post assessment: no apparent anesthetic complications and tolerated procedure well    Post vital signs: stable    Level of consciousness: awake, alert and oriented    Nausea/Vomiting: no nausea/vomiting    Complications: none    Transfer of care protocol was followed      Last vitals:   Visit Vitals  BP (!) 108/52   Pulse (!) 56   Temp 36.5 °C (97.7 °F) (Oral)   Resp (!) 1   Ht 5' 3" (1.6 m)   Wt 80.7 kg (178 lb)   LMP  (LMP Unknown)   SpO2 99%   Breastfeeding? No   BMI 31.53 kg/m²     "

## 2017-07-11 ENCOUNTER — OFFICE VISIT (OUTPATIENT)
Dept: FAMILY MEDICINE | Facility: CLINIC | Age: 72
End: 2017-07-11
Payer: MEDICARE

## 2017-07-11 VITALS
HEIGHT: 63 IN | BODY MASS INDEX: 32.23 KG/M2 | WEIGHT: 181.88 LBS | DIASTOLIC BLOOD PRESSURE: 64 MMHG | OXYGEN SATURATION: 96 % | TEMPERATURE: 98 F | SYSTOLIC BLOOD PRESSURE: 106 MMHG | HEART RATE: 56 BPM

## 2017-07-11 DIAGNOSIS — D49.2 NEOPLASM OF SKIN: ICD-10-CM

## 2017-07-11 DIAGNOSIS — I70.0 ATHEROSCLEROSIS OF AORTA: ICD-10-CM

## 2017-07-11 DIAGNOSIS — Z98.890 H/O COLONOSCOPY: ICD-10-CM

## 2017-07-11 DIAGNOSIS — I20.89 ANGINA DECUBITUS: ICD-10-CM

## 2017-07-11 DIAGNOSIS — I20.89 STABLE ANGINA: ICD-10-CM

## 2017-07-11 DIAGNOSIS — E78.5 HYPERLIPIDEMIA, UNSPECIFIED HYPERLIPIDEMIA TYPE: ICD-10-CM

## 2017-07-11 DIAGNOSIS — K29.70 GASTRITIS, PRESENCE OF BLEEDING UNSPECIFIED, UNSPECIFIED CHRONICITY, UNSPECIFIED GASTRITIS TYPE: ICD-10-CM

## 2017-07-11 DIAGNOSIS — Z23 NEED FOR VACCINATION WITH 13-POLYVALENT PNEUMOCOCCAL CONJUGATE VACCINE: ICD-10-CM

## 2017-07-11 DIAGNOSIS — R13.10 DYSPHAGIA, UNSPECIFIED TYPE: Primary | ICD-10-CM

## 2017-07-11 PROCEDURE — 90670 PCV13 VACCINE IM: CPT | Mod: S$GLB,,, | Performed by: FAMILY MEDICINE

## 2017-07-11 PROCEDURE — 1159F MED LIST DOCD IN RCRD: CPT | Mod: S$GLB,,, | Performed by: FAMILY MEDICINE

## 2017-07-11 PROCEDURE — 99214 OFFICE O/P EST MOD 30 MIN: CPT | Mod: S$GLB,,, | Performed by: FAMILY MEDICINE

## 2017-07-11 PROCEDURE — G0009 ADMIN PNEUMOCOCCAL VACCINE: HCPCS | Mod: S$GLB,,, | Performed by: FAMILY MEDICINE

## 2017-07-11 PROCEDURE — 99499 UNLISTED E&M SERVICE: CPT | Mod: S$GLB,,, | Performed by: FAMILY MEDICINE

## 2017-07-11 PROCEDURE — 99999 PR PBB SHADOW E&M-EST. PATIENT-LVL III: CPT | Mod: PBBFAC,,, | Performed by: FAMILY MEDICINE

## 2017-07-11 PROCEDURE — 1126F AMNT PAIN NOTED NONE PRSNT: CPT | Mod: S$GLB,,, | Performed by: FAMILY MEDICINE

## 2017-07-11 NOTE — PROGRESS NOTES
Subjective:       Patient ID: Ade Castellano is a 71 y.o. female.    Chief Complaint: Follow-up    Patient presents with concerned about her choking on water. She states it happens with food as well. She has a lot of heartburn day and night. She states she even gets this with water. She states after she eats she has stomach pain as well. She has  had 2 colonoscopies. The second one was a 3 month repeat from the 1st. Her second colonoscopy showed:  Impression:          - Non-bleeding internal hemorrhoids.                       - Diverticulosis in the sigmoid colon.                       - History of colon polyp removed piecemeal earlier                        this year - tatoo visualized and no residual                        adenomatous tissue appreciated.                       - Five 4 to 5 mm polyps in the transverse colon,                        removed with a cold snare. Resected and retrieved.                       - One 5 mm polyp in the descending colon, removed                        with a cold snare. Resected and retrieved.  Recommendation:      - Discharge patient to home.                       - Return to normal activities tomorrow.                       - Resume previous diet today.                       - Await pathology results.                       - Repeat colonoscopy in 1 year for surveillance.                       - Return to primary care physician in 4 weeks.                       - The findings and recommendations were discussed                        with the patient and their family.    She also has CAD. She has had stress tests, but suffers with heart attacks with these. She was told that she would need an angiogram. She has not had this scheduled yet with cardiology, but is due for follow up.     She would also like to see a dermatologist for skin cancer  screening due to a changing lesion.        Review of Systems   Constitutional: Positive for activity change.   Respiratory:  "Negative for chest tightness.    Cardiovascular: Negative for chest pain.       Objective:       Vitals:    07/11/17 1102   BP: 106/64   Pulse: (!) 56   Temp: 97.9 °F (36.6 °C)   TempSrc: Oral   SpO2: 96%   Weight: 82.5 kg (181 lb 14.1 oz)   Height: 5' 3" (1.6 m)       Physical Exam   Constitutional: She is oriented to person, place, and time. She appears well-developed and well-nourished. No distress.   HENT:   Head: Normocephalic and atraumatic.   Eyes: Conjunctivae are normal.   Neck: Normal range of motion. Neck supple. Carotid bruit is not present.   Cardiovascular: Normal rate, regular rhythm and normal heart sounds.  Exam reveals no gallop and no friction rub.    No murmur heard.  Pulmonary/Chest: Effort normal and breath sounds normal. No respiratory distress. She has no wheezes. She has no rales.   Musculoskeletal: She exhibits no edema.   Neurological: She is alert and oriented to person, place, and time.   Skin: She is not diaphoretic.       Assessment:       1. Dysphagia, unspecified type    2. Gastritis, presence of bleeding unspecified, unspecified chronicity, unspecified gastritis type    3. H/O colonoscopy    4. Atherosclerosis of aorta    5. Angina decubitus    6. Stable angina    7. Hyperlipidemia, unspecified hyperlipidemia type    8. Neoplasm of skin    9. Need for vaccination with 13-polyvalent pneumococcal conjugate vaccine        Plan:       Ade was seen today for follow-up.    Diagnoses and all orders for this visit:    Dysphagia, unspecified type  -     Ambulatory referral to Gastroenterology    Gastritis, presence of bleeding unspecified, unspecified chronicity, unspecified gastritis type  -     Ambulatory referral to Gastroenterology    H/O colonoscopy    Atherosclerosis of aorta  -     Comprehensive metabolic panel; Future  -     Lipid panel; Future  -     TSH; Future  -     CBC auto differential; Future  She is due for repeat labs  Angina decubitus    Stable angina  -     " Comprehensive metabolic panel; Future  -     Lipid panel; Future  -     TSH; Future  -     CBC auto differential; Future  Stable. Per cardiology, dr. Ortiz    Hyperlipidemia, unspecified hyperlipidemia type  -     Comprehensive metabolic panel; Future  -     Lipid panel; Future  -     TSH; Future    Neoplasm of skin  -     Ambulatory referral to Dermatology    Need for vaccination with 13-polyvalent pneumococcal conjugate vaccine  -     (In Office Administered) Pneumococcal Conjugate Vaccine (13 Valent) (IM)

## 2017-07-12 ENCOUNTER — TELEPHONE (OUTPATIENT)
Dept: FAMILY MEDICINE | Facility: CLINIC | Age: 72
End: 2017-07-12

## 2017-07-12 NOTE — TELEPHONE ENCOUNTER
The patient has been scheduled with Dr. Miranda @ [facility] 7/17/17 , her gastro referral has also been sent to [facility] for scheduling

## 2017-07-20 ENCOUNTER — LAB VISIT (OUTPATIENT)
Dept: LAB | Facility: HOSPITAL | Age: 72
End: 2017-07-20
Attending: FAMILY MEDICINE
Payer: MEDICARE

## 2017-07-20 DIAGNOSIS — E78.5 HYPERLIPIDEMIA, UNSPECIFIED HYPERLIPIDEMIA TYPE: ICD-10-CM

## 2017-07-20 DIAGNOSIS — I70.0 ATHEROSCLEROSIS OF AORTA: ICD-10-CM

## 2017-07-20 DIAGNOSIS — I20.89 STABLE ANGINA: ICD-10-CM

## 2017-07-20 LAB
ALBUMIN SERPL BCP-MCNC: 3.5 G/DL
ALP SERPL-CCNC: 45 U/L
ALT SERPL W/O P-5'-P-CCNC: 16 U/L
ANION GAP SERPL CALC-SCNC: 8 MMOL/L
AST SERPL-CCNC: 16 U/L
BASOPHILS # BLD AUTO: 0.1 K/UL
BASOPHILS NFR BLD: 1.6 %
BILIRUB SERPL-MCNC: 0.4 MG/DL
BUN SERPL-MCNC: 14 MG/DL
CALCIUM SERPL-MCNC: 9.3 MG/DL
CHLORIDE SERPL-SCNC: 104 MMOL/L
CHOLEST/HDLC SERPL: 2.7 {RATIO}
CO2 SERPL-SCNC: 28 MMOL/L
CREAT SERPL-MCNC: 0.7 MG/DL
DIFFERENTIAL METHOD: NORMAL
EOSINOPHIL # BLD AUTO: 0.4 K/UL
EOSINOPHIL NFR BLD: 6.7 %
ERYTHROCYTE [DISTWIDTH] IN BLOOD BY AUTOMATED COUNT: 13.5 %
EST. GFR  (AFRICAN AMERICAN): >60 ML/MIN/1.73 M^2
EST. GFR  (NON AFRICAN AMERICAN): >60 ML/MIN/1.73 M^2
GLUCOSE SERPL-MCNC: 114 MG/DL
HCT VFR BLD AUTO: 44.8 %
HDL/CHOLESTEROL RATIO: 36.4 %
HDLC SERPL-MCNC: 140 MG/DL
HDLC SERPL-MCNC: 51 MG/DL
HGB BLD-MCNC: 14.6 G/DL
LDLC SERPL CALC-MCNC: 54.8 MG/DL
LYMPHOCYTES # BLD AUTO: 2 K/UL
LYMPHOCYTES NFR BLD: 32.1 %
MCH RBC QN AUTO: 30.1 PG
MCHC RBC AUTO-ENTMCNC: 32.6 G/DL
MCV RBC AUTO: 92 FL
MONOCYTES # BLD AUTO: 0.6 K/UL
MONOCYTES NFR BLD: 9.6 %
NEUTROPHILS # BLD AUTO: 3.1 K/UL
NEUTROPHILS NFR BLD: 50 %
NONHDLC SERPL-MCNC: 89 MG/DL
PLATELET # BLD AUTO: 239 K/UL
PMV BLD AUTO: 10.7 FL
POTASSIUM SERPL-SCNC: 4.2 MMOL/L
PROT SERPL-MCNC: 6.7 G/DL
RBC # BLD AUTO: 4.85 M/UL
SODIUM SERPL-SCNC: 140 MMOL/L
TRIGL SERPL-MCNC: 171 MG/DL
TSH SERPL DL<=0.005 MIU/L-ACNC: 2.63 UIU/ML
WBC # BLD AUTO: 6.24 K/UL

## 2017-07-20 PROCEDURE — 80061 LIPID PANEL: CPT

## 2017-07-20 PROCEDURE — 85025 COMPLETE CBC W/AUTO DIFF WBC: CPT

## 2017-07-20 PROCEDURE — 36415 COLL VENOUS BLD VENIPUNCTURE: CPT

## 2017-07-20 PROCEDURE — 80053 COMPREHEN METABOLIC PANEL: CPT

## 2017-07-20 PROCEDURE — 84443 ASSAY THYROID STIM HORMONE: CPT

## 2017-08-15 ENCOUNTER — TELEPHONE (OUTPATIENT)
Dept: FAMILY MEDICINE | Facility: CLINIC | Age: 72
End: 2017-08-15

## 2017-08-15 NOTE — TELEPHONE ENCOUNTER
----- Message from Tiffanie Bal sent at 8/15/2017 11:13 AM CDT -----  Contact: Petty  Called to request paperwork to be filled out for pt's oxygen. Jackelyn can be reached @ [phone] ext [phone].

## 2017-08-15 NOTE — TELEPHONE ENCOUNTER
See message below regarding oxygen. I spoke to Jackelyn at [facility] who called for oxygen re-certification, and asked for me to fax Dr Huitron last clinic note to her. I faxed it to [phone]).

## 2017-08-16 ENCOUNTER — TELEPHONE (OUTPATIENT)
Dept: FAMILY MEDICINE | Facility: CLINIC | Age: 72
End: 2017-08-16

## 2017-08-16 NOTE — TELEPHONE ENCOUNTER
Dr Huitron, pt needs a recert completed for oxygen. I placed the form from [facility] in your accordion.

## 2017-08-18 ENCOUNTER — OFFICE VISIT (OUTPATIENT)
Dept: FAMILY MEDICINE | Facility: CLINIC | Age: 72
End: 2017-08-18
Payer: MEDICARE

## 2017-08-18 VITALS
BODY MASS INDEX: 31.99 KG/M2 | OXYGEN SATURATION: 97 % | SYSTOLIC BLOOD PRESSURE: 100 MMHG | TEMPERATURE: 98 F | RESPIRATION RATE: 16 BRPM | WEIGHT: 180.56 LBS | DIASTOLIC BLOOD PRESSURE: 60 MMHG | HEIGHT: 63 IN | HEART RATE: 49 BPM

## 2017-08-18 DIAGNOSIS — J44.9 CHRONIC OBSTRUCTIVE PULMONARY DISEASE, UNSPECIFIED COPD TYPE: ICD-10-CM

## 2017-08-18 DIAGNOSIS — R09.02 HYPOXIA: Primary | ICD-10-CM

## 2017-08-18 PROCEDURE — 99499 UNLISTED E&M SERVICE: CPT | Mod: S$GLB,,, | Performed by: FAMILY MEDICINE

## 2017-08-18 PROCEDURE — 3008F BODY MASS INDEX DOCD: CPT | Mod: S$GLB,,, | Performed by: FAMILY MEDICINE

## 2017-08-18 PROCEDURE — 1126F AMNT PAIN NOTED NONE PRSNT: CPT | Mod: S$GLB,,, | Performed by: FAMILY MEDICINE

## 2017-08-18 PROCEDURE — 99999 PR PBB SHADOW E&M-EST. PATIENT-LVL III: CPT | Mod: PBBFAC,,, | Performed by: FAMILY MEDICINE

## 2017-08-18 PROCEDURE — 99213 OFFICE O/P EST LOW 20 MIN: CPT | Mod: S$GLB,,, | Performed by: FAMILY MEDICINE

## 2017-08-18 PROCEDURE — 1159F MED LIST DOCD IN RCRD: CPT | Mod: S$GLB,,, | Performed by: FAMILY MEDICINE

## 2017-08-18 RX ORDER — EPINASTINE HYDROCHLORIDE 0.5 MG/ML
SOLUTION/ DROPS OPHTHALMIC
Refills: 2 | COMMUNITY
Start: 2017-07-20 | End: 2018-08-23

## 2017-08-18 RX ORDER — FLUTICASONE PROPIONATE 50 MCG
SPRAY, SUSPENSION (ML) NASAL
Refills: 6 | COMMUNITY
Start: 2017-07-31 | End: 2020-01-14

## 2017-08-18 NOTE — PROGRESS NOTES
"Subjective:       Patient ID: Ade Castellano is a 71 y.o. female.    Chief Complaint: COPD (oxygen recertification)    Patient is here for re certification of her oxygen. She doesn't use it all the time, but uses this with ambulation. She had this originally ordered by pulmonary. She states she uses this at night and when she is moving around a lot. She has CAD, hypoxia, and COPD. She states she had a stress test and hd a heart attack in the past while she had the stress test.       Review of Systems    Objective:       Vitals:    08/18/17 0745 08/18/17 0817 08/18/17 0819   BP: 100/60     Pulse: (!) 49     Resp: 16     Temp: 98.3 °F (36.8 °C)     TempSrc: Oral     SpO2: (!) 89% (!) 87% ON RA with ambulation 97% on 2L  NC   Weight: 81.9 kg (180 lb 8.9 oz)     Height: 5' 3" (1.6 m)         Physical Exam   Constitutional: She is oriented to person, place, and time. She appears well-developed and well-nourished. No distress.   HENT:   Head: Normocephalic and atraumatic.   Neck: Normal range of motion. Neck supple.   Cardiovascular: Normal rate, regular rhythm and normal heart sounds.  Exam reveals no gallop and no friction rub.    No murmur heard.  Pulmonary/Chest: Effort normal and breath sounds normal. No respiratory distress. She has no wheezes. She has no rales.   Neurological: She is alert and oriented to person, place, and time.   Skin: She is not diaphoretic.   Psychiatric: She has a normal mood and affect.       Assessment:       1. Hypoxia    2. Chronic obstructive pulmonary disease, unspecified COPD type        Plan:       Ade was seen today for copd.    Diagnoses and all orders for this visit:    Hypoxia    Chronic obstructive pulmonary disease, unspecified COPD type  RECERTIFICATION PAPERWORK FILLED OUT. SHE DOES REQUIRE HOME OXYGEN. SHE GOT DOWN TO 87% WITH AMBULATION AND WALKING WAS STOPPED. SHE CAME BACK UP TO 97% ON 2LNC.          "

## 2017-08-29 RX ORDER — NITROGLYCERIN 0.4 MG/1
TABLET SUBLINGUAL
Qty: 25 TABLET | Refills: 0 | Status: SHIPPED | OUTPATIENT
Start: 2017-08-29 | End: 2019-07-25

## 2017-10-10 ENCOUNTER — ANESTHESIA EVENT (OUTPATIENT)
Dept: ENDOSCOPY | Facility: HOSPITAL | Age: 72
End: 2017-10-10
Payer: MEDICARE

## 2017-10-11 ENCOUNTER — SURGERY (OUTPATIENT)
Age: 72
End: 2017-10-11

## 2017-10-11 ENCOUNTER — ANESTHESIA (OUTPATIENT)
Dept: ENDOSCOPY | Facility: HOSPITAL | Age: 72
End: 2017-10-11
Payer: MEDICARE

## 2017-10-11 ENCOUNTER — HOSPITAL ENCOUNTER (OUTPATIENT)
Facility: HOSPITAL | Age: 72
Discharge: HOME | End: 2017-10-11
Attending: INTERNAL MEDICINE | Admitting: INTERNAL MEDICINE
Payer: MEDICARE

## 2017-10-11 VITALS
SYSTOLIC BLOOD PRESSURE: 124 MMHG | RESPIRATION RATE: 18 BRPM | BODY MASS INDEX: 32.76 KG/M2 | OXYGEN SATURATION: 94 % | DIASTOLIC BLOOD PRESSURE: 65 MMHG | TEMPERATURE: 98 F | HEIGHT: 62 IN | WEIGHT: 178 LBS | HEART RATE: 62 BPM

## 2017-10-11 DIAGNOSIS — R13.10 DYSPHAGIA: ICD-10-CM

## 2017-10-11 PROCEDURE — 43239 EGD BIOPSY SINGLE/MULTIPLE: CPT | Performed by: INTERNAL MEDICINE

## 2017-10-11 PROCEDURE — 37000009 HC ANESTHESIA EA ADD 15 MINS: Performed by: INTERNAL MEDICINE

## 2017-10-11 PROCEDURE — 25000003 PHARM REV CODE 250: Performed by: ANESTHESIOLOGY

## 2017-10-11 PROCEDURE — 88305 TISSUE EXAM BY PATHOLOGIST: CPT | Mod: 59 | Performed by: PATHOLOGY

## 2017-10-11 PROCEDURE — 27201012 HC FORCEPS, HOT/COLD, DISP: Performed by: INTERNAL MEDICINE

## 2017-10-11 PROCEDURE — D9220A PRA ANESTHESIA: Mod: CRNA,,, | Performed by: NURSE ANESTHETIST, CERTIFIED REGISTERED

## 2017-10-11 PROCEDURE — 63600175 PHARM REV CODE 636 W HCPCS: Performed by: NURSE ANESTHETIST, CERTIFIED REGISTERED

## 2017-10-11 PROCEDURE — 88305 TISSUE EXAM BY PATHOLOGIST: CPT | Mod: 26,,, | Performed by: PATHOLOGY

## 2017-10-11 PROCEDURE — D9220A PRA ANESTHESIA: Mod: ANES,,, | Performed by: ANESTHESIOLOGY

## 2017-10-11 PROCEDURE — 37000008 HC ANESTHESIA 1ST 15 MINUTES: Performed by: INTERNAL MEDICINE

## 2017-10-11 RX ORDER — LIDOCAINE HCL/PF 100 MG/5ML
SYRINGE (ML) INTRAVENOUS
Status: DISCONTINUED | OUTPATIENT
Start: 2017-10-11 | End: 2017-10-11

## 2017-10-11 RX ORDER — ONDANSETRON 2 MG/ML
INJECTION INTRAMUSCULAR; INTRAVENOUS
Status: DISCONTINUED | OUTPATIENT
Start: 2017-10-11 | End: 2017-10-11

## 2017-10-11 RX ORDER — PROPOFOL 10 MG/ML
VIAL (ML) INTRAVENOUS
Status: DISCONTINUED
Start: 2017-10-11 | End: 2017-10-11 | Stop reason: HOSPADM

## 2017-10-11 RX ORDER — LIDOCAINE HYDROCHLORIDE 10 MG/ML
1 INJECTION, SOLUTION EPIDURAL; INFILTRATION; INTRACAUDAL; PERINEURAL ONCE
Status: DISCONTINUED | OUTPATIENT
Start: 2017-10-11 | End: 2017-10-11 | Stop reason: HOSPADM

## 2017-10-11 RX ORDER — SODIUM CHLORIDE 9 MG/ML
INJECTION, SOLUTION INTRAVENOUS CONTINUOUS
Status: DISCONTINUED | OUTPATIENT
Start: 2017-10-11 | End: 2017-10-11 | Stop reason: HOSPADM

## 2017-10-11 RX ORDER — PROPOFOL 10 MG/ML
VIAL (ML) INTRAVENOUS
Status: DISCONTINUED | OUTPATIENT
Start: 2017-10-11 | End: 2017-10-11

## 2017-10-11 RX ORDER — LIDOCAINE HYDROCHLORIDE 20 MG/ML
INJECTION, SOLUTION EPIDURAL; INFILTRATION; INTRACAUDAL; PERINEURAL
Status: DISCONTINUED
Start: 2017-10-11 | End: 2017-10-11 | Stop reason: HOSPADM

## 2017-10-11 RX ORDER — ONDANSETRON 2 MG/ML
INJECTION INTRAMUSCULAR; INTRAVENOUS
Status: DISCONTINUED
Start: 2017-10-11 | End: 2017-10-11 | Stop reason: HOSPADM

## 2017-10-11 RX ADMIN — PROPOFOL 20 MG: 10 INJECTION, EMULSION INTRAVENOUS at 10:10

## 2017-10-11 RX ADMIN — PROPOFOL 50 MG: 10 INJECTION, EMULSION INTRAVENOUS at 10:10

## 2017-10-11 RX ADMIN — ONDANSETRON 4 MG: 2 INJECTION, SOLUTION INTRAMUSCULAR; INTRAVENOUS at 10:10

## 2017-10-11 RX ADMIN — LIDOCAINE HYDROCHLORIDE 100 MG: 20 INJECTION, SOLUTION INTRAVENOUS at 10:10

## 2017-10-11 RX ADMIN — PROPOFOL 100 MG: 10 INJECTION, EMULSION INTRAVENOUS at 10:10

## 2017-10-11 RX ADMIN — SODIUM CHLORIDE: 0.9 INJECTION, SOLUTION INTRAVENOUS at 09:10

## 2017-10-11 NOTE — TRANSFER OF CARE
"Anesthesia Transfer of Care Note    Patient: Ade Castellano    Procedure(s) Performed: Procedure(s) (LRB):  ESOPHAGOGASTRODUODENOSCOPY (EGD) (N/A)    Patient location: PACU    Anesthesia Type: general    Transport from OR: Transported from OR on room air with adequate spontaneous ventilation    Post pain: adequate analgesia    Post assessment: no apparent anesthetic complications and tolerated procedure well    Post vital signs: stable    Level of consciousness: awake, alert and oriented    Complications: none    Transfer of care protocol was followed      Last vitals:   Visit Vitals  BP (!) 102/54   Pulse (!) 58   Temp 36.2 °C (97.2 °F) (Oral)   Resp 18   Ht 5' 2" (1.575 m)   Wt 80.7 kg (178 lb)   LMP  (LMP Unknown)   SpO2 96%   Breastfeeding? No   BMI 32.56 kg/m²     "

## 2017-10-11 NOTE — DISCHARGE INSTRUCTIONS
Esophagitis     With esophagitis, the lining of the esophagus is inflamed.   Do you often have burning pain in your chest? You may have esophagitis. This is when the lining of the esophagus becomes red and swollen (inflamed). The esophagus is the tube that connects your throat to your stomach. This sheet tells you more about esophagitis. It also explains your treatment options.  Main types of esophagitis  Reflux esophagitis. This is the more common type. It is caused by GERD (gastroesophageal reflux disease). Stomach contents with stomach acid flow back up into the esophagus. This happens over and over. It leads to inflammation. Risk factors can include:  · Being overweight  · Asthma  · Smoking  · Pregnancy  · Frequent vomiting  · Certain medicines (such as aspirin and other anti-inflammatories)  · Hiatal hernia  Infectious esophagitis. This is caused by an infection. You are more at risk for this if you have a weakened immune system and poor nutrition. Antibiotic use can also be a factor. The infection is often due to the following:  · A type of fungus (typically candida)  · A virus, such as herpes simplex virus 1 (HSV-1) or cytomegalovirus (CMV)  Eosinophilic esophagitis. Foods or other things around you can give you an allergic reaction. This triggers an immune response and leads to esophagitis.  Pill-induced esophagitis. Certain types of medicines can cause inflammation and ulcers in the esophagus. These include doxycycline, aspirin, NSAIDs, alendronate, potassium, quinidine, iron.  Symptoms of esophagitis  The following symptoms can occur with esophagitis:  · Pain when swallowing, or trouble swallowing  · Pain behind your breastbone (heartburn)  · Acid regurgitation  · Chronic sore throat  · Gum Inflammation  · Cavities  · Bad breath  · Nausea  · Pain in your upper belly (abdomen)  · Bleeding (indicated by bright red vomit or black, tarry stool)  These symptoms occur more often with reflux  esophagitis:  · Coughing, wheezing, or asthma  · Hoarseness  Diagnosis of esophagitis  Your healthcare provider will ask about your health history and symptoms. Youll also be examined. Sometimes certain tests are needed. These may include:  · Upper endoscopy. A thin, flexible tube with a tiny light and camera is used. It is inserted through the mouth down into the esophagus. This lets the provider look for damage. A small sample of tissue (biopsy) may also be removed. The sample is sent to a lab for testing.  · Upper GI X-ray with barium. An X-ray is done after you drink a substance called barium. Barium may make problems in the esophagus easier to see on an x-ray.  · Esophageal pH. A soft, thin tube is passed into the esophagus through the nose or mouth for 24 hours. It measures the acid level in the esophagus.  · Esophageal manometry. A soft, thin tube is passed into the esophagus through the nose or mouth. It measures muscle contractions in the esophagus.  Treatment of esophagitis  Medicines. Different medicines can help treat esophagitis. The medicine used will depend on the type of esophagitis you have. Talk with your healthcare provider.  Lifestyle changes. Making the following changes can help reduce irritation and ease your symptoms:  · Avoid spicy foods (pepper, chili powder, rios). Also avoid hard foods (nuts, crackers, raw vegetables) and acidic foods and drinks (tomatoes, citrus fruits and juices). Other problem foods include chocolate, peppermint, nutmeg, and foods high in fat.  · Until you can swallow without pain, follow a combined liquid and soft diet. Try foods such as cooked cereals, mashed potatoes, and soups.  · Take small bites and chew your food thoroughly.  · Avoid large meals and heavy evening meals. Don't lie down within 2 to 3 hours of eating.  · Get to or stay at a healthy weight.  · Avoid alcohol, caffeine, and smoking or tobacco products.  · Brush and floss your teeth  · Raise your  upper body by 4 to 6 inches when lying in bed. This can be done using a foam wedge. Or put blocks under the legs at the head of your bed.  Surgery. This may be needed for severe reflux esophagitis. Other noninvasive procedures to treat GERD and esophagitis are being studied. Your provider can tell you more.  Why treatment Is important  Without treatment, esophagitis can get worse. This is especially true with severe reflux esophagitis. For instance, continued symptoms can cause scarring of the esophagus. Over time, this can cause a narrowing the esophagus (stricture). This can make it hard to pass food down to the stomach. As symptoms go on they can also cause changes in the lining of the esophagus. These changes can put you at a slightly higher risk of cancer of the esophagus.   Date Last Reviewed: 7/1/2016 © 2000-2017 [employer]. [address] [place] [id number]. All rights reserved. This information is not intended as a substitute for professional medical care. Always follow your healthcare professional's instructions.        Gastritis (Adult)    Gastritis is inflammation and irritation of the stomach lining. It can be present for a short time (acute) or be long lasting (chronic). Gastritis is often caused by infection with bacteria called H pylori. More than a third of people in the [place] have this bacteria in their bodies. In many cases, H pylori causes no problems or symptoms. In some people, though, the infection irritates the stomach lining and causes gastritis. Other causes of stomach irritation include drinking alcohol or taking pain-relieving medicines called NSAIDs (such as aspirin or ibuprofen).   Symptoms of gastritis can include:  · Abdominal pain or bloating  · Loss of appetite  · Nausea or vomiting  · Vomiting blood or having black stools  · Feeling more tired than usual  An inflamed and irritated stomach lining is more likely to develop a sore called an ulcer. To help prevent  this, gastritis should be treated.  Home care  If needed, medicines may be prescribed. If you have H pylori infection, treating it will likely relieve your symptoms. Other changes can help reduce stomach irritation and help it heal.  · If you have been prescribed medicines for H pylori infection, take them as directed. Take all of the medicine until it is finished or your healthcare provider tells you to stop, even if you feel better.  · Your healthcare provider may recommend avoiding NSAIDs. If you take daily aspirin for your heart or other medical reasons, do not stop without talking to your healthcare provider first.  · Avoid drinking alcohol.  · Stop smoking. Smoking can irritate the stomach and delay healing. As much as possible, stay away from second hand smoke.  Follow-up care  Follow up with your healthcare provider, or as advised by our staff. Testing may be needed to check for inflammation or an ulcer.  When to seek medical advice  Call your healthcare provider for any of the following:  · Stomach pain that gets worse or moves to the lower right abdomen (appendix area)  · Chest pain that appears or gets worse, or spreads to the back, neck, shoulder, or arm  · Frequent vomiting (cant keep down liquids)  · Blood in the stool or vomit (red or black in color)  · Feeling weak or dizzy  · Fever of 100.4ºF (38ºC) or higher, or as directed by your healthcare provider  Date Last Reviewed: 6/22/2015  © 0399-2493 [employer]. [address] [place] [address], [place] [address]. All rights reserved. This information is not intended as a substitute for professional medical care. Always follow your healthcare professional's instructions.

## 2017-10-11 NOTE — H&P
"Gastroenterology    CC: dysphagia    HPI 72 y.o. female sent for EGD for dysphagia      Past Medical History:   Diagnosis Date    Angina pectoris     Bell's palsy     left facial weakness    Breast cancer     RIGHT    CAD (coronary artery disease)     Cervical cancer     Chronic bronchitis     COPD (chronic obstructive pulmonary disease)     Dr. Katz    Dental bridge present     Emphysema of lung     H/O colonoscopy 06/2017    due for repeat colonsocopy in 6/2018    History of heart artery stent     Dr. Ortiz  x2 stents    Hyperlipidemia     Hypertension     Myocardial infarction     SUNITHA (obstructive sleep apnea)     intolerant to mask    Pneumonia     PUD (peptic ulcer disease)     Sleep apnea          Review of Systems  General ROS: negative for chills, fever or weight loss  Cardiovascular ROS: no chest pain or dyspnea on exertion    Physical Examination  BP (!) 102/56   Pulse (!) 56   Temp 97.6 °F (36.4 °C)   Resp 18   Ht 5' 2" (1.575 m)   Wt 80.7 kg (178 lb)   LMP  (LMP Unknown)   SpO2 (!) 94%   Breastfeeding? No   BMI 32.56 kg/m²   General appearance: alert, cooperative, no distress  HENT: Normocephalic, atraumatic, neck symmetrical, no nasal discharge   Eyes: conjunctivae/corneas clear, no icterus, EOM's intact  Lungs: resp non-labored  Heart: regular rate   Abdomen: soft, non-tender; bowel sounds normoactive; no organomegaly  Extremities: extremities symmetric; no clubbing, cyanosis, or edema  Neurologic: Alert and oriented X 3, normal strength, normal coordination and gait    Assessment:     Dysphagia    Plan:   EGD      "

## 2017-10-11 NOTE — ANESTHESIA PREPROCEDURE EVALUATION
10/11/2017  Ade Castellano is a 72 y.o., female.    Anesthesia Evaluation     I have reviewed the Nursing Notes.      Review of Systems  Anesthesia Hx:  No problems with previous Anesthesia   Social:  Former Smoker    Hematology/Oncology:         -- Cancer in past history: Breast   Cardiovascular:   Denies Pacemaker. Hypertension Past MI CAD   CABG/stent  Angina hyperlipidemia    Pulmonary:   COPD, moderate Sleep Apnea    Hepatic/GI:   Bowel Prep. PUD,    Neurological:   Denies CVA. Denies Seizures.    Endocrine:  Endocrine Normal        Physical Exam  General:  Obesity    Airway/Jaw/Neck:  AIRWAY FINDINGS: Normal           Mental Status:  Mental Status Findings: Normal        Anesthesia Plan  Type of Anesthesia, risks & benefits discussed:  Anesthesia Type:  general  Patient's Preference:   Intra-op Monitoring Plan: standard ASA monitors  Intra-op Monitoring Plan Comments:   Post Op Pain Control Plan:   Post Op Pain Control Plan Comments:   Induction:   IV  Beta Blocker:  Patient is on a Beta-Blocker and has received one dose within the past 24 hours (No further documentation required).       Informed Consent: Patient understands risks and agrees with Anesthesia plan.  Questions answered. Anesthesia consent signed with patient.  ASA Score: 3     Day of Surgery Review of History & Physical:    H&P update referred to the surgeon.         Ready For Surgery From Anesthesia Perspective.

## 2017-10-12 NOTE — ANESTHESIA POSTPROCEDURE EVALUATION
"Anesthesia Post Evaluation    Patient: Ade Castellano    Procedure(s) Performed: Procedure(s) (LRB):  ESOPHAGOGASTRODUODENOSCOPY (EGD) (N/A)    Final Anesthesia Type: general  Patient location during evaluation: GI PACU  Patient participation: Yes- Able to Participate  Level of consciousness: awake and alert, awake and oriented  Post-procedure vital signs: reviewed and stable  Pain management: adequate  Airway patency: patent  PONV status at discharge: No PONV  Anesthetic complications: no      Cardiovascular status: blood pressure returned to baseline and hemodynamically stable  Respiratory status: unassisted, spontaneous ventilation and room air  Hydration status: euvolemic  Follow-up not needed.        Visit Vitals  /65 (BP Location: Left arm, Patient Position: Lying)   Pulse 62   Temp 36.7 °C (98 °F) (Skin)   Resp 18   Ht 5' 2" (1.575 m)   Wt 80.7 kg (178 lb)   LMP  (LMP Unknown)   SpO2 (!) 94%   Breastfeeding? No   BMI 32.56 kg/m²       Pain/Sergio Score: Pain Assessment Performed: Yes (10/11/2017 11:07 AM)  Presence of Pain: denies (10/11/2017 11:07 AM)  Pain Rating Prior to Med Admin: 0 (10/11/2017  9:44 AM)  Sergio Score: 10 (10/11/2017 11:07 AM)      "

## 2017-11-29 ENCOUNTER — HOSPITAL ENCOUNTER (INPATIENT)
Facility: HOSPITAL | Age: 72
LOS: 6 days | Discharge: HOME HEALTH | DRG: 208 | End: 2017-12-05
Attending: EMERGENCY MEDICINE | Admitting: HOSPITALIST
Payer: MEDICARE

## 2017-11-29 DIAGNOSIS — Z97.8 ENDOTRACHEALLY INTUBATED: ICD-10-CM

## 2017-11-29 DIAGNOSIS — R07.9 CHEST PAIN: ICD-10-CM

## 2017-11-29 DIAGNOSIS — J96.01 ACUTE RESPIRATORY FAILURE WITH HYPOXIA AND HYPERCAPNIA: Primary | ICD-10-CM

## 2017-11-29 DIAGNOSIS — J96.90 RESPIRATORY FAILURE: ICD-10-CM

## 2017-11-29 DIAGNOSIS — R41.82 ALTERED MENTAL STATUS, UNSPECIFIED ALTERED MENTAL STATUS TYPE: ICD-10-CM

## 2017-11-29 DIAGNOSIS — J96.02 ACUTE RESPIRATORY FAILURE WITH HYPOXIA AND HYPERCAPNIA: Primary | ICD-10-CM

## 2017-11-29 PROBLEM — J18.9 CAVITARY PNEUMONIA: Status: ACTIVE | Noted: 2017-11-29

## 2017-11-29 PROBLEM — J96.22 ACUTE ON CHRONIC RESPIRATORY FAILURE WITH HYPOXIA AND HYPERCAPNIA: Status: ACTIVE | Noted: 2017-11-29

## 2017-11-29 PROBLEM — Z72.0 TOBACCO ABUSE: Status: ACTIVE | Noted: 2017-11-29

## 2017-11-29 PROBLEM — J98.4 CAVITARY PNEUMONIA: Status: ACTIVE | Noted: 2017-11-29

## 2017-11-29 PROBLEM — Z66 DNR (DO NOT RESUSCITATE): Status: ACTIVE | Noted: 2017-11-29

## 2017-11-29 PROBLEM — J96.21 ACUTE ON CHRONIC RESPIRATORY FAILURE WITH HYPOXIA AND HYPERCAPNIA: Status: ACTIVE | Noted: 2017-11-29

## 2017-11-29 PROBLEM — R91.8 LUNG MASS: Status: ACTIVE | Noted: 2017-11-29

## 2017-11-29 LAB
ALBUMIN SERPL BCP-MCNC: 4.2 G/DL
ALLENS TEST: ABNORMAL
ALLENS TEST: ABNORMAL
ALP SERPL-CCNC: 49 U/L
ALT SERPL W/O P-5'-P-CCNC: 23 U/L
AMMONIA PLAS-SCNC: 75 UMOL/L
AMPHET+METHAMPHET UR QL: NEGATIVE
ANION GAP SERPL CALC-SCNC: 9 MMOL/L
AORTIC VALVE STENOSIS: ABNORMAL
APTT BLDCRRT: 23.6 SEC
AST SERPL-CCNC: 33 U/L
B-OH-BUTYR BLD STRIP-SCNC: 0.7 MMOL/L
BACTERIA #/AREA URNS HPF: ABNORMAL /HPF
BARBITURATES UR QL SCN>200 NG/ML: NEGATIVE
BASOPHILS # BLD AUTO: 0.02 K/UL
BASOPHILS NFR BLD: 0.2 %
BENZODIAZ UR QL SCN>200 NG/ML: NEGATIVE
BILIRUB SERPL-MCNC: 0.4 MG/DL
BILIRUB UR QL STRIP: NEGATIVE
BNP SERPL-MCNC: 216 PG/ML
BUN SERPL-MCNC: 17 MG/DL
BZE UR QL SCN: NEGATIVE
CALCIUM SERPL-MCNC: 8.9 MG/DL
CANNABINOIDS UR QL SCN: NEGATIVE
CHLORIDE SERPL-SCNC: 97 MMOL/L
CLARITY UR: CLEAR
CO2 SERPL-SCNC: 33 MMOL/L
COLOR UR: YELLOW
CREAT SERPL-MCNC: 0.8 MG/DL
CREAT UR-MCNC: 165.2 MG/DL
DELSYS: ABNORMAL
DELSYS: ABNORMAL
DIFFERENTIAL METHOD: ABNORMAL
EOSINOPHIL # BLD AUTO: 0 K/UL
EOSINOPHIL NFR BLD: 0 %
ERYTHROCYTE [DISTWIDTH] IN BLOOD BY AUTOMATED COUNT: 13.4 %
ERYTHROCYTE [SEDIMENTATION RATE] IN BLOOD BY WESTERGREN METHOD: 22 MM/H
EST. GFR  (AFRICAN AMERICAN): >60 ML/MIN/1.73 M^2
EST. GFR  (NON AFRICAN AMERICAN): >60 ML/MIN/1.73 M^2
ESTIMATED PA SYSTOLIC PRESSURE: 54.44
ETHANOL SERPL-MCNC: <10 MG/DL
FIO2: 100
GLOBAL PERICARDIAL EFFUSION: ABNORMAL
GLUCOSE SERPL-MCNC: 217 MG/DL
GLUCOSE UR QL STRIP: NEGATIVE
HCO3 UR-SCNC: 32.4 MMOL/L (ref 24–28)
HCO3 UR-SCNC: 40 MMOL/L (ref 24–28)
HCT VFR BLD AUTO: 45.2 %
HGB BLD-MCNC: 14.6 G/DL
HGB UR QL STRIP: ABNORMAL
HYALINE CASTS #/AREA URNS LPF: 3 /LPF
INR PPP: 1
KETONES UR QL STRIP: NEGATIVE
LACTATE SERPL-SCNC: 0.6 MMOL/L
LEUKOCYTE ESTERASE UR QL STRIP: NEGATIVE
LIPASE SERPL-CCNC: 129 U/L
LYMPHOCYTES # BLD AUTO: 2.2 K/UL
LYMPHOCYTES NFR BLD: 19.6 %
MCH RBC QN AUTO: 30.3 PG
MCHC RBC AUTO-ENTMCNC: 32.3 G/DL
MCV RBC AUTO: 94 FL
METHADONE UR QL SCN>300 NG/ML: NEGATIVE
MICROSCOPIC COMMENT: ABNORMAL
MIN VOL: 8.8
MITRAL VALVE MOBILITY: NORMAL
MODE: ABNORMAL
MONOCYTES # BLD AUTO: 1.2 K/UL
MONOCYTES NFR BLD: 10.2 %
NEUTROPHILS # BLD AUTO: 7.9 K/UL
NEUTROPHILS NFR BLD: 70 %
NITRITE UR QL STRIP: NEGATIVE
OPIATES UR QL SCN: NEGATIVE
OSMOLALITY SERPL: 295 MOSM/KG
PCO2 BLDA: 45 MMHG (ref 35–45)
PCO2 BLDA: 86.6 MMHG (ref 35–45)
PCP UR QL SCN>25 NG/ML: NEGATIVE
PEEP: 5
PH SMN: 7.27 [PH] (ref 7.35–7.45)
PH SMN: 7.47 [PH] (ref 7.35–7.45)
PH UR STRIP: 5 [PH] (ref 5–8)
PIP: 41
PLATELET # BLD AUTO: 253 K/UL
PMV BLD AUTO: 9.7 FL
PO2 BLDA: 525 MMHG (ref 80–100)
PO2 BLDA: 577 MMHG (ref 80–100)
POC BE: 8 MMOL/L
POC BE: 9 MMOL/L
POC SATURATED O2: 100 % (ref 95–100)
POC SATURATED O2: 100 % (ref 95–100)
POC TCO2: 34 MMOL/L (ref 23–27)
POC TCO2: 43 MMOL/L (ref 23–27)
POCT GLUCOSE: 209 MG/DL (ref 70–110)
POTASSIUM SERPL-SCNC: 5.1 MMOL/L
PROT SERPL-MCNC: 7.4 G/DL
PROT UR QL STRIP: ABNORMAL
PROTHROMBIN TIME: 10.3 SEC
RBC # BLD AUTO: 4.82 M/UL
RBC #/AREA URNS HPF: 1 /HPF (ref 0–4)
RETIRED EF AND QEF - SEE NOTES: 60 (ref 55–65)
SAMPLE: ABNORMAL
SAMPLE: ABNORMAL
SITE: ABNORMAL
SITE: ABNORMAL
SODIUM SERPL-SCNC: 139 MMOL/L
SP GR UR STRIP: 1.02 (ref 1–1.03)
SP02: 100
TOXICOLOGY INFORMATION: NORMAL
TRICUSPID VALVE REGURGITATION: ABNORMAL
TROPONIN I SERPL DL<=0.01 NG/ML-MCNC: 0.06 NG/ML
TROPONIN I SERPL DL<=0.01 NG/ML-MCNC: 0.19 NG/ML
TROPONIN I SERPL DL<=0.01 NG/ML-MCNC: <0.006 NG/ML
TSH SERPL DL<=0.005 MIU/L-ACNC: 0.73 UIU/ML
URN SPEC COLLECT METH UR: ABNORMAL
UROBILINOGEN UR STRIP-ACNC: NEGATIVE EU/DL
VT: 400
WBC # BLD AUTO: 11.26 K/UL
WBC #/AREA URNS HPF: 2 /HPF (ref 0–5)

## 2017-11-29 PROCEDURE — 80320 DRUG SCREEN QUANTALCOHOLS: CPT

## 2017-11-29 PROCEDURE — 27000221 HC OXYGEN, UP TO 24 HOURS

## 2017-11-29 PROCEDURE — 96360 HYDRATION IV INFUSION INIT: CPT

## 2017-11-29 PROCEDURE — 83690 ASSAY OF LIPASE: CPT

## 2017-11-29 PROCEDURE — 82962 GLUCOSE BLOOD TEST: CPT

## 2017-11-29 PROCEDURE — 63600175 PHARM REV CODE 636 W HCPCS: Performed by: EMERGENCY MEDICINE

## 2017-11-29 PROCEDURE — 84443 ASSAY THYROID STIM HORMONE: CPT

## 2017-11-29 PROCEDURE — 20000000 HC ICU ROOM

## 2017-11-29 PROCEDURE — 0BH17EZ INSERTION OF ENDOTRACHEAL AIRWAY INTO TRACHEA, VIA NATURAL OR ARTIFICIAL OPENING: ICD-10-PCS | Performed by: EMERGENCY MEDICINE

## 2017-11-29 PROCEDURE — 80307 DRUG TEST PRSMV CHEM ANLYZR: CPT

## 2017-11-29 PROCEDURE — 87040 BLOOD CULTURE FOR BACTERIA: CPT

## 2017-11-29 PROCEDURE — 85730 THROMBOPLASTIN TIME PARTIAL: CPT

## 2017-11-29 PROCEDURE — 63600175 PHARM REV CODE 636 W HCPCS: Performed by: INTERNAL MEDICINE

## 2017-11-29 PROCEDURE — 82010 KETONE BODYS QUAN: CPT

## 2017-11-29 PROCEDURE — 87086 URINE CULTURE/COLONY COUNT: CPT

## 2017-11-29 PROCEDURE — 99291 CRITICAL CARE FIRST HOUR: CPT | Mod: 25

## 2017-11-29 PROCEDURE — 36415 COLL VENOUS BLD VENIPUNCTURE: CPT

## 2017-11-29 PROCEDURE — 85610 PROTHROMBIN TIME: CPT

## 2017-11-29 PROCEDURE — 25000003 PHARM REV CODE 250

## 2017-11-29 PROCEDURE — 83880 ASSAY OF NATRIURETIC PEPTIDE: CPT

## 2017-11-29 PROCEDURE — 51702 INSERT TEMP BLADDER CATH: CPT

## 2017-11-29 PROCEDURE — 25000242 PHARM REV CODE 250 ALT 637 W/ HCPCS: Performed by: EMERGENCY MEDICINE

## 2017-11-29 PROCEDURE — 5A1945Z RESPIRATORY VENTILATION, 24-96 CONSECUTIVE HOURS: ICD-10-PCS | Performed by: EMERGENCY MEDICINE

## 2017-11-29 PROCEDURE — S0028 INJECTION, FAMOTIDINE, 20 MG: HCPCS | Performed by: EMERGENCY MEDICINE

## 2017-11-29 PROCEDURE — 25000003 PHARM REV CODE 250: Performed by: INTERNAL MEDICINE

## 2017-11-29 PROCEDURE — 25500020 PHARM REV CODE 255: Performed by: EMERGENCY MEDICINE

## 2017-11-29 PROCEDURE — 87205 SMEAR GRAM STAIN: CPT

## 2017-11-29 PROCEDURE — 80053 COMPREHEN METABOLIC PANEL: CPT

## 2017-11-29 PROCEDURE — 94640 AIRWAY INHALATION TREATMENT: CPT

## 2017-11-29 PROCEDURE — 99900035 HC TECH TIME PER 15 MIN (STAT)

## 2017-11-29 PROCEDURE — 99291 CRITICAL CARE FIRST HOUR: CPT | Mod: ,,, | Performed by: INTERNAL MEDICINE

## 2017-11-29 PROCEDURE — 83605 ASSAY OF LACTIC ACID: CPT

## 2017-11-29 PROCEDURE — 82803 BLOOD GASES ANY COMBINATION: CPT

## 2017-11-29 PROCEDURE — 81000 URINALYSIS NONAUTO W/SCOPE: CPT

## 2017-11-29 PROCEDURE — 36600 WITHDRAWAL OF ARTERIAL BLOOD: CPT

## 2017-11-29 PROCEDURE — 93306 TTE W/DOPPLER COMPLETE: CPT | Mod: 26,,, | Performed by: INTERNAL MEDICINE

## 2017-11-29 PROCEDURE — 84484 ASSAY OF TROPONIN QUANT: CPT | Mod: 91

## 2017-11-29 PROCEDURE — 99900026 HC AIRWAY MAINTENANCE (STAT)

## 2017-11-29 PROCEDURE — 85025 COMPLETE CBC W/AUTO DIFF WBC: CPT

## 2017-11-29 PROCEDURE — 87070 CULTURE OTHR SPECIMN AEROBIC: CPT

## 2017-11-29 PROCEDURE — 25000003 PHARM REV CODE 250: Performed by: EMERGENCY MEDICINE

## 2017-11-29 PROCEDURE — 82140 ASSAY OF AMMONIA: CPT

## 2017-11-29 PROCEDURE — 63600175 PHARM REV CODE 636 W HCPCS: Performed by: HOSPITALIST

## 2017-11-29 PROCEDURE — 25000003 PHARM REV CODE 250: Performed by: HOSPITALIST

## 2017-11-29 PROCEDURE — 93306 TTE W/DOPPLER COMPLETE: CPT

## 2017-11-29 PROCEDURE — 83930 ASSAY OF BLOOD OSMOLALITY: CPT

## 2017-11-29 PROCEDURE — 31500 INSERT EMERGENCY AIRWAY: CPT

## 2017-11-29 RX ORDER — CIPROFLOXACIN HYDROCHLORIDE 3 MG/ML
1 SOLUTION/ DROPS OPHTHALMIC EVERY 4 HOURS
Status: DISCONTINUED | OUTPATIENT
Start: 2017-11-29 | End: 2017-12-05 | Stop reason: HOSPADM

## 2017-11-29 RX ORDER — PROPOFOL 10 MG/ML
5 INJECTION, EMULSION INTRAVENOUS CONTINUOUS
Status: DISCONTINUED | OUTPATIENT
Start: 2017-11-29 | End: 2017-11-30

## 2017-11-29 RX ORDER — ROCURONIUM BROMIDE 10 MG/ML
30 INJECTION, SOLUTION INTRAVENOUS
Status: DISCONTINUED | OUTPATIENT
Start: 2017-11-29 | End: 2017-11-29

## 2017-11-29 RX ORDER — FAMOTIDINE 10 MG/ML
20 INJECTION INTRAVENOUS DAILY
Status: DISCONTINUED | OUTPATIENT
Start: 2017-11-29 | End: 2017-12-01

## 2017-11-29 RX ORDER — ROCURONIUM BROMIDE 10 MG/ML
INJECTION, SOLUTION INTRAVENOUS
Status: COMPLETED
Start: 2017-11-29 | End: 2017-11-29

## 2017-11-29 RX ORDER — PROPOFOL 10 MG/ML
10 INJECTION, EMULSION INTRAVENOUS
Status: COMPLETED | OUTPATIENT
Start: 2017-11-29 | End: 2017-11-29

## 2017-11-29 RX ORDER — METOPROLOL TARTRATE 25 MG/1
25 TABLET, FILM COATED ORAL 2 TIMES DAILY
Status: DISCONTINUED | OUTPATIENT
Start: 2017-11-29 | End: 2017-11-30

## 2017-11-29 RX ORDER — SODIUM CHLORIDE 9 MG/ML
INJECTION, SOLUTION INTRAVENOUS CONTINUOUS
Status: DISCONTINUED | OUTPATIENT
Start: 2017-11-29 | End: 2017-12-01

## 2017-11-29 RX ORDER — LACTULOSE 10 G/15ML
30 SOLUTION ORAL 3 TIMES DAILY
Status: DISCONTINUED | OUTPATIENT
Start: 2017-11-29 | End: 2017-11-29

## 2017-11-29 RX ORDER — IPRATROPIUM BROMIDE AND ALBUTEROL SULFATE 2.5; .5 MG/3ML; MG/3ML
3 SOLUTION RESPIRATORY (INHALATION)
Status: COMPLETED | OUTPATIENT
Start: 2017-11-29 | End: 2017-11-29

## 2017-11-29 RX ORDER — PROPOFOL 10 MG/ML
INJECTION, EMULSION INTRAVENOUS
Status: DISPENSED
Start: 2017-11-29 | End: 2017-11-29

## 2017-11-29 RX ORDER — ONDANSETRON 2 MG/ML
4 INJECTION INTRAMUSCULAR; INTRAVENOUS EVERY 4 HOURS PRN
Status: DISCONTINUED | OUTPATIENT
Start: 2017-11-29 | End: 2017-12-05 | Stop reason: HOSPADM

## 2017-11-29 RX ORDER — ROSUVASTATIN CALCIUM 10 MG/1
20 TABLET, COATED ORAL DAILY
Status: DISCONTINUED | OUTPATIENT
Start: 2017-11-29 | End: 2017-12-05 | Stop reason: HOSPADM

## 2017-11-29 RX ORDER — NAPROXEN SODIUM 220 MG/1
81 TABLET, FILM COATED ORAL DAILY
Status: DISCONTINUED | OUTPATIENT
Start: 2017-11-29 | End: 2017-12-05 | Stop reason: HOSPADM

## 2017-11-29 RX ORDER — LACTULOSE 10 G/15ML
30 SOLUTION ORAL 3 TIMES DAILY
Status: COMPLETED | OUTPATIENT
Start: 2017-11-29 | End: 2017-11-29

## 2017-11-29 RX ORDER — ROCURONIUM BROMIDE 10 MG/ML
100 INJECTION, SOLUTION INTRAVENOUS
Status: COMPLETED | OUTPATIENT
Start: 2017-11-29 | End: 2017-11-29

## 2017-11-29 RX ORDER — ETOMIDATE 2 MG/ML
INJECTION INTRAVENOUS
Status: COMPLETED
Start: 2017-11-29 | End: 2017-11-29

## 2017-11-29 RX ORDER — ETOMIDATE 2 MG/ML
30 INJECTION INTRAVENOUS
Status: COMPLETED | OUTPATIENT
Start: 2017-11-29 | End: 2017-11-29

## 2017-11-29 RX ORDER — IPRATROPIUM BROMIDE AND ALBUTEROL SULFATE 2.5; .5 MG/3ML; MG/3ML
3 SOLUTION RESPIRATORY (INHALATION) EVERY 4 HOURS
Status: DISCONTINUED | OUTPATIENT
Start: 2017-11-29 | End: 2017-11-29

## 2017-11-29 RX ORDER — ALBUTEROL SULFATE 2.5 MG/.5ML
7.5 SOLUTION RESPIRATORY (INHALATION) CONTINUOUS
Status: DISCONTINUED | OUTPATIENT
Start: 2017-11-29 | End: 2017-11-29

## 2017-11-29 RX ORDER — IPRATROPIUM BROMIDE AND ALBUTEROL SULFATE 2.5; .5 MG/3ML; MG/3ML
3 SOLUTION RESPIRATORY (INHALATION) EVERY 4 HOURS
Status: COMPLETED | OUTPATIENT
Start: 2017-11-29 | End: 2017-11-29

## 2017-11-29 RX ORDER — POLYETHYLENE GLYCOL 3350 17 G/17G
17 POWDER, FOR SOLUTION ORAL DAILY
Status: DISCONTINUED | OUTPATIENT
Start: 2017-11-29 | End: 2017-12-05 | Stop reason: HOSPADM

## 2017-11-29 RX ORDER — ENOXAPARIN SODIUM 100 MG/ML
40 INJECTION SUBCUTANEOUS EVERY 24 HOURS
Status: DISCONTINUED | OUTPATIENT
Start: 2017-11-29 | End: 2017-12-05 | Stop reason: HOSPADM

## 2017-11-29 RX ADMIN — LACTULOSE 30 G: 20 SOLUTION ORAL at 06:11

## 2017-11-29 RX ADMIN — ASPIRIN 81 MG CHEWABLE TABLET 81 MG: 81 TABLET CHEWABLE at 12:11

## 2017-11-29 RX ADMIN — SODIUM CHLORIDE: 0.9 INJECTION, SOLUTION INTRAVENOUS at 06:11

## 2017-11-29 RX ADMIN — METOPROLOL TARTRATE 25 MG: 25 TABLET ORAL at 10:11

## 2017-11-29 RX ADMIN — PROPOFOL 20 MCG/KG/MIN: 10 INJECTION, EMULSION INTRAVENOUS at 08:11

## 2017-11-29 RX ADMIN — LACTULOSE 30 G: 20 SOLUTION ORAL at 02:11

## 2017-11-29 RX ADMIN — CIPROFLOXACIN HYDROCHLORIDE 1 DROP: 3 SOLUTION/ DROPS OPHTHALMIC at 06:11

## 2017-11-29 RX ADMIN — IPRATROPIUM BROMIDE AND ALBUTEROL SULFATE 3 ML: .5; 3 SOLUTION RESPIRATORY (INHALATION) at 09:11

## 2017-11-29 RX ADMIN — ALBUTEROL SULFATE 7.5 MG: 2.5 SOLUTION RESPIRATORY (INHALATION) at 04:11

## 2017-11-29 RX ADMIN — ALBUTEROL SULFATE 7.5 MG: 2.5 SOLUTION RESPIRATORY (INHALATION) at 03:11

## 2017-11-29 RX ADMIN — PIPERACILLIN AND TAZOBACTAM 4.5 G: 4; .5 INJECTION, POWDER, LYOPHILIZED, FOR SOLUTION INTRAVENOUS; PARENTERAL at 08:11

## 2017-11-29 RX ADMIN — METHYLPREDNISOLONE SODIUM SUCCINATE 80 MG: 40 INJECTION, POWDER, FOR SOLUTION INTRAMUSCULAR; INTRAVENOUS at 10:11

## 2017-11-29 RX ADMIN — PROPOFOL 10 MCG/KG/MIN: 10 INJECTION, EMULSION INTRAVENOUS at 05:11

## 2017-11-29 RX ADMIN — SODIUM CHLORIDE: 0.9 INJECTION, SOLUTION INTRAVENOUS at 12:11

## 2017-11-29 RX ADMIN — METOPROLOL TARTRATE 25 MG: 25 TABLET ORAL at 08:11

## 2017-11-29 RX ADMIN — SODIUM CHLORIDE: 0.9 INJECTION, SOLUTION INTRAVENOUS at 11:11

## 2017-11-29 RX ADMIN — FAMOTIDINE 20 MG: 10 INJECTION INTRAVENOUS at 08:11

## 2017-11-29 RX ADMIN — IOHEXOL 70 ML: 350 INJECTION, SOLUTION INTRAVENOUS at 05:11

## 2017-11-29 RX ADMIN — ROCURONIUM BROMIDE 100 MG: 10 INJECTION, SOLUTION INTRAVENOUS at 03:11

## 2017-11-29 RX ADMIN — IPRATROPIUM BROMIDE AND ALBUTEROL SULFATE 3 ML: .5; 3 SOLUTION RESPIRATORY (INHALATION) at 01:11

## 2017-11-29 RX ADMIN — ENOXAPARIN SODIUM 40 MG: 100 INJECTION SUBCUTANEOUS at 05:11

## 2017-11-29 RX ADMIN — ETOMIDATE 30 MG: 2 INJECTION INTRAVENOUS at 03:11

## 2017-11-29 RX ADMIN — PIPERACILLIN AND TAZOBACTAM 4.5 G: 4; .5 INJECTION, POWDER, LYOPHILIZED, FOR SOLUTION INTRAVENOUS; PARENTERAL at 01:11

## 2017-11-29 RX ADMIN — PROPOFOL 20 MCG/KG/MIN: 10 INJECTION, EMULSION INTRAVENOUS at 06:11

## 2017-11-29 RX ADMIN — METHYLPREDNISOLONE SODIUM SUCCINATE 80 MG: 40 INJECTION, POWDER, FOR SOLUTION INTRAMUSCULAR; INTRAVENOUS at 12:11

## 2017-11-29 RX ADMIN — SODIUM CHLORIDE 1000 ML: 0.9 INJECTION, SOLUTION INTRAVENOUS at 03:11

## 2017-11-29 RX ADMIN — IPRATROPIUM BROMIDE AND ALBUTEROL SULFATE 3 ML: .5; 3 SOLUTION RESPIRATORY (INHALATION) at 05:11

## 2017-11-29 RX ADMIN — POLYETHYLENE GLYCOL 3350 17 G: 17 POWDER, FOR SOLUTION ORAL at 08:11

## 2017-11-29 RX ADMIN — IPRATROPIUM BROMIDE AND ALBUTEROL SULFATE 3 ML: .5; 3 SOLUTION RESPIRATORY (INHALATION) at 04:11

## 2017-11-29 RX ADMIN — ROSUVASTATIN CALCIUM 20 MG: 10 TABLET, FILM COATED ORAL at 08:11

## 2017-11-29 RX ADMIN — VANCOMYCIN HYDROCHLORIDE 1250 MG: 1 INJECTION, POWDER, LYOPHILIZED, FOR SOLUTION INTRAVENOUS at 12:11

## 2017-11-29 RX ADMIN — CIPROFLOXACIN HYDROCHLORIDE 1 DROP: 3 SOLUTION/ DROPS OPHTHALMIC at 10:11

## 2017-11-29 NOTE — ED PROVIDER NOTES
"Encounter Date: 11/29/2017    SCRIBE #1 NOTE: I, Cristela Aguilar, am scribing for, and in the presence of,  Yessica Collins MD. I have scribed the following portions of the note - Other sections scribed: HPI, ROS, PE.       History   No chief complaint on file.    CC: Respiratory Distress    HPI: 72 year old female with COPD, chronic bronchitis, emphysema of the lung, hx of pneumonia, and hx of MI presents to the ED via EMS in severe respiratory distress. Per EMS, pt was hypoxic at 80% on 2.5 L home O2 by [place] upon EMS arrival. EMS states pt has a hx of R breast cancer s/p lumpectomy and was told she had a mass to the R lung last week. Pt is scheduled for a biopsy today of this mass.    Pt's sister reports pt has been feeling ill since this past week. Pt was in [facility]) from Monday 11/20 to yesterday Tuesday 11/28. Pt started feeling ill and developing acute on chronic SOB in [place]. She ran out of O2 while there. She was concerned about going to an ER in [place] because her insurance wouldn't cover it. She finally went to a clinic in [place] today where she got a "steroid shot" and a prescription for levofloxacin which she has filled.     No recent hospitalizations.    Hx is otherwise limited secondary to pt being in severe respiratory distress.    PMHx: Angina pectoris; Bell's palsy; Breast cancer; CAD; Cervical cancer; Chronic bronchitis; COPD; Emphysema of lung; H/O colonoscopy; History of heart artery stent; Hyperlipidemia; Hypertension; Myocardial infarction; SUNITHA; Pneumonia; PUD; and Sleep apnea    PSHx: Cervix surgery; Breast surgery; Tonsillectomy; Adenoidectomy; sweat glands axillary regions; Colonoscopy (N/A, 3/17/2017); Breast biopsy (Right); and Colonoscopy (N/A, 6/30/2017)      The history is provided by the EMS personnel. No  was used.     Review of patient's allergies indicates:  No Known Allergies  Past Medical History:   Diagnosis Date    Angina pectoris  "    Bell's palsy     left facial weakness    Breast cancer     RIGHT    CAD (coronary artery disease)     Cervical cancer     Chronic bronchitis     COPD (chronic obstructive pulmonary disease)     Dr. Katz    Dental bridge present     Emphysema of lung     H/O colonoscopy 06/2017    due for repeat colonsocopy in 6/2018    History of heart artery stent     Dr. Ortiz  x2 stents    Hyperlipidemia     Hypertension     Myocardial infarction     SUNITHA (obstructive sleep apnea)     intolerant to mask    Pneumonia     PUD (peptic ulcer disease)     Sleep apnea      Past Surgical History:   Procedure Laterality Date    ADENOIDECTOMY      BREAST BIOPSY Right     BREAST SURGERY      lumpectomy right side     CERVIX SURGERY      COLONOSCOPY N/A 3/17/2017    Procedure: COLONOSCOPY;  Surgeon: Julio Rudd MD;  Location: [facility] ENDO;  Service: Endoscopy;  Laterality: N/A;    COLONOSCOPY N/A 6/30/2017    Procedure: COLONOSCOPY;  Surgeon: Julio Rudd MD;  Location: [facility] ENDO;  Service: Endoscopy;  Laterality: N/A;    sweat glands axillary regions      TONSILLECTOMY       Family History   Problem Relation Age of Onset    Cancer Mother      breast    Heart disease Mother     Breast cancer Mother     Cancer Father      lung    Cancer Sister      lung    Cancer Maternal Grandmother     Heart disease Maternal Grandmother     Cancer Maternal Grandfather     Heart disease Maternal Grandfather     Cancer Paternal Grandmother     Cancer Paternal Grandfather     Cancer Sister      Social History   Substance Use Topics    Smoking status: Former Smoker     Packs/day: 3.00     Years: 50.00     Quit date: 8/23/2009    Smokeless tobacco: Never Used    Alcohol use No     Review of Systems   Unable to perform ROS: Severe respiratory distress       Physical Exam     Initial Vitals   BP Pulse Resp Temp SpO2   -- -- -- -- --      MAP       --         Physical Exam    Nursing note and vitals  reviewed.  Constitutional: She appears well-developed and well-nourished. She is not diaphoretic. She appears distressed.   Awake, eyes open, moaning/grunting respirations, no purposeful responses to noxious stimuli   HENT:   Head: Normocephalic and atraumatic.   Oropharynx dry. Pt has upper dentures and has missing lower teeth.   Eyes: Conjunctivae and EOM are normal. Pupils are equal, round, and reactive to light.   No gaze deviation. Pupils 3 bilaterally   Neck: Normal range of motion. Neck supple.   Cardiovascular: Regular rhythm, normal heart sounds and intact distal pulses. Tachycardia present.    Pulmonary/Chest: She is in respiratory distress. She has wheezes (bilaterally). She has no rhonchi. She has no rales.   Abdominal: Soft. Normal appearance and bowel sounds are normal. She exhibits no distension. There is no guarding.   Obese. Erythema noted beneath pannus on the R that appears to be chronic.   Musculoskeletal: Normal range of motion. She exhibits no edema or tenderness.   Neurological: She is alert and oriented to person, place, and time. She has normal strength.   Skin: Skin is warm and dry. No rash noted.   Psychiatric:   Altered, obtunded         ED Course   Critical Care  Date/Time: 11/29/2017 4:00 AM  Performed by: BHASKAR CHRISTOPHER  Authorized by: EDD FERNANDEZ   Direct patient critical care time: 10 minutes  Additional history critical care time: 10 minutes  Ordering / reviewing critical care time: 10 minutes  Documentation critical care time: 10 minutes  Consulting other physicians critical care time: 5 minutes  Total critical care time (exclusive of procedural time) : 45 minutes    Intubation  Date/Time: 11/29/2017 4:00 AM  Performed by: BHASKAR CHRISTOPHER  Authorized by: EDD FERNANDEZ   Consent Done: Emergent Situation  Indications: respiratory failure  Intubation method: direct  Patient status: paralyzed (RSI)  Laryngoscope size: Mac 3  Tube size: 7.5 mm  Tube type: cuffed  Number  of attempts: 1  Cricoid pressure: yes  Cords visualized: yes  Post-procedure assessment: chest rise and CO2 detector  Breath sounds: wheezing and equal  Cuff inflated: yes  ETT to lip: 25 cm  Tube secured with: ETT kilgore  Chest x-ray interpreted by me.  Chest x-ray findings: endotracheal tube in appropriate position  Patient tolerance: Patient tolerated the procedure well with no immediate complications        Labs Reviewed   POCT GLUCOSE - Abnormal; Notable for the following:        Result Value    POCT Glucose 209 (*)     All other components within normal limits     EKG Readings: (Independently Interpreted)   03:07: Sinus tach, . Normal axis. ST depressions in II, III, aVF, V3-V6. Questionable ST elevation in aVL, aVR. No STEMI.  03:16: Sinus tach, . Normal axis. ST depressions in II, III, aVF, V3-V6. Questionable ST elevation in aVL, aVR. No STEMI.  04:26: Sinus tach, . Normal axis. ST depressions in II, III, aVF, V3-V6. More pronounced ST elevation in aVL, aVR.       X-Rays:   Independently Interpreted Readings:   Other Readings:  CXR +R perihilar mass    Medical Decision Making:   History:   Old Medical Records: I decided to obtain old medical records.  Old Records Summarized: records from previous admission(s) and records from clinic visits.  Initial Assessment:   This is an emergent evaluation of a 72 y.o. Female with acute severe SOB.  Differential Diagnosis:   Ddx includes COPD exacerbation with respiratory failure, ACS, CHF, PNA, sepsis, other.  Independently Interpreted Test(s):   I have ordered and independently interpreted X-rays - see prior notes.  I have ordered and independently interpreted EKG Reading(s) - see prior notes  Clinical Tests:   Lab Tests: Ordered and Reviewed  Radiological Study: Ordered and Reviewed  Medical Tests: Ordered and Reviewed  ED Management:  Patient intubated (RSI) on ED arrival for AMS with evident respiratory failure. OGT and santos placed by nursing  staff. CXR shows tubes in adequate position.    CT head shows no ICH or CVA to cause present AMS.    ABG 7.27, pAO2 525, pACO2 86 immediately post-intubation. After 1 hour on PRVC, , rate 22, ABG 7.46, pAO2 577, pACO2 32. Nebs given via ETT.    EKG shows diffuse ST depressions without obvious STEMI.    CXR shows R perihilar mass. This is evidently known to patient though has not been previously imaged here.    Labs show hyperglycemia without DKA or ARF (beta-hydroxybutyrate 0.7). Troponin negative despite EKG changes. . Lipase 129. Lactate negative. Ammonia 75, no priors.     Unclear etiology of acute respiratory failure -- ?acute on chronic COPD exacerbation with CO2 narcosis, worsening over the last ~9 days while out of town. Patient is pending CTA chest to r/o PE and CT abdom/pelvis for elevated lipase/ammonia.     Discussed with nocturnist Dr. Babb for MICU admit for respiratory support. Re: ammonia, tx'ing with lactulose per OGT. Will continue neb txs. Will do maintenance fluids. Patient on propofol for sedation.    Patient's family members updated as to diagnosis and plan of care.  Other:   I have discussed this case with another health care provider.            Scribe Attestation:   Scribe #1: I performed the above scribed service and the documentation accurately describes the services I performed. I attest to the accuracy of the note.    Attending Attestation:           Physician Attestation for Scribe:  Physician Attestation Statement for Scribe #1: I, Yessica Collins MD, reviewed documentation, as scribed by Cristela Aguilar in my presence, and it is both accurate and complete.                 ED Course      Clinical Impression:   The primary encounter diagnosis was Acute respiratory failure with hypoxia and hypercapnia. Diagnoses of Endotracheally intubated, Altered mental status, unspecified altered mental status type, and Respiratory failure were also pertinent to this visit.                            Yessica Collins MD  11/29/17 0649

## 2017-11-29 NOTE — ASSESSMENT & PLAN NOTE
Sister reported she wanted to be DNR and son in agreement with expressed wishes. Family updated on status and DNR orders written.

## 2017-11-29 NOTE — SUBJECTIVE & OBJECTIVE
Past Medical History:   Diagnosis Date    Acute respiratory failure with hypoxia and hypercapnia 11/29/2017    Angina pectoris     Bell's palsy     left facial weakness    Breast cancer     RIGHT    CAD (coronary artery disease)     Cervical cancer     Chronic bronchitis     COPD (chronic obstructive pulmonary disease)     Dr. Sterling Krueger bridge present     Emphysema of lung     H/O colonoscopy 06/2017    due for repeat colonsocopy in 6/2018    History of heart artery stent     Dr. Ortiz  x2 stents    Hyperlipidemia     Hypertension     Myocardial infarction     SUNITHA (obstructive sleep apnea)     intolerant to mask    Pneumonia     PUD (peptic ulcer disease)     Sleep apnea        Past Surgical History:   Procedure Laterality Date    ADENOIDECTOMY      BREAST BIOPSY Right     BREAST SURGERY      lumpectomy right side     CERVIX SURGERY      COLONOSCOPY N/A 3/17/2017    Procedure: COLONOSCOPY;  Surgeon: Julio Rudd MD;  Location: [facility] ENDO;  Service: Endoscopy;  Laterality: N/A;    COLONOSCOPY N/A 6/30/2017    Procedure: COLONOSCOPY;  Surgeon: Julio Rudd MD;  Location: [facility] ENDO;  Service: Endoscopy;  Laterality: N/A;    sweat glands axillary regions      TONSILLECTOMY         Review of patient's allergies indicates:  No Known Allergies    Family History     Problem Relation (Age of Onset)    Breast cancer Mother    Cancer Mother, Father, Sister, Maternal Grandmother, Maternal Grandfather, Paternal Grandmother, Paternal Grandfather, Sister    Heart disease Mother, Maternal Grandmother, Maternal Grandfather        Social History Main Topics    Smoking status: Current Every Day Smoker     Packs/day: 0.25     Years: 50.00     Types: Cigarettes     Last attempt to quit: 8/23/2009    Smokeless tobacco: Never Used    Alcohol use No    Drug use: No    Sexual activity: No      Review of Systems   Unable to obtain due to pt status- intubated  Objective:     Vital Signs (Most  Recent):  Temp: 97.8 °F (36.6 °C) (11/29/17 0730)  Pulse: 73 (11/29/17 1115)  Resp: 17 (11/29/17 1115)  BP: 107/66 (11/29/17 1115)  SpO2: 99 % (11/29/17 1115) Vital Signs (24h Range):  Temp:  [97.8 °F (36.6 °C)-99.7 °F (37.6 °C)] 97.8 °F (36.6 °C)  Pulse:  [] 73  Resp:  [16-34] 17  SpO2:  [95 %-100 %] 99 %  BP: ()/() 107/66   Weight: 80.7 kg (178 lb)  Body mass index is 32.56 kg/m².      Intake/Output Summary (Last 24 hours) at 11/29/17 1202  Last data filed at 11/29/17 1100   Gross per 24 hour   Intake           703.67 ml   Output              540 ml   Net           163.67 ml       Physical Exam  Physical Exam:  General: no distress  Eyes:  conjunctivae/corneas clear  Nose: no discharge  Neck: no jugular venous distention  Lungs:  + expiratory flow limitation with prolonged expiratory phase, no rales  Heart: regular rate and rhythm and no murmur  Abdomen: non-distended, soft, non-tender  Extremities: no cyanosis or edema, or clubbing  Skin: No rashes or lesions. good skin turgor  Neurologic: sedated, symmetric facies, PERRL, withdraws to painful stimuli    Vents:  Vent Mode: PRVC (11/29/17 0411)  Set Rate: 22 bmp (11/29/17 0520)  Vt Set: 400 mL (11/29/17 0520)  PEEP/CPAP: 5 cmH20 (11/29/17 0520)  Oxygen Concentration (%): 30 (11/29/17 1115)  Peak Airway Pressure: 38.3 cmH2O (11/29/17 0520)  Total Ve: 19.7 mL (11/29/17 0520)  F/VT Ratio<105 (RSBI): (!) 24.36 (11/29/17 0520)  Lines/Drains/Airways     Drain                 NG/OG Tube 11/29/17 0330 Miller sump 16 Fr. Right mouth less than 1 day         Urethral Catheter 11/29/17 0330 Double-lumen 16 Fr. less than 1 day          Airway                 Airway - Non-Surgical 11/29/17 0320 Endotracheal Tube less than 1 day          Peripheral Intravenous Line                 Peripheral IV - Single Lumen 11/29/17 0332 Left Antecubital less than 1 day         Peripheral IV - Single Lumen 11/29/17 0334 Right Wrist less than 1 day              Significant  Labs:    CBC/Anemia Profile:    Recent Labs  Lab 11/29/17  0304   WBC 11.26   HGB 14.6   HCT 45.2      MCV 94   RDW 13.4        Chemistries:    Recent Labs  Lab 11/29/17  0304      K 5.1   CL 97   CO2 33*   BUN 17   CREATININE 0.8   CALCIUM 8.9   ALBUMIN 4.2   PROT 7.4   BILITOT 0.4   ALKPHOS 49*   ALT 23   AST 33       CT reviewed- see HPI for my interpretation    Echo reviewed

## 2017-11-29 NOTE — PLAN OF CARE
Problem: Patient Care Overview  Goal: Plan of Care Review  Outcome: Ongoing (interventions implemented as appropriate)   11/29/17 9242   Coping/Psychosocial   Plan Of Care Reviewed With patient     Admitted this morning from Home with cc of jannette. Was intubated in ER. Patient respond to pain, does not follow commands. On Mechanical Vent ETT at 23 cm. On the lip. Appears comfortable Family at bedside. Olvera with 300 mol urine this morning.No fall or injury no skin breakdown noted.

## 2017-11-29 NOTE — PLAN OF CARE
11/29/17 0326   Patient Assessment/Suction   Level of Consciousness (AVPU) unresponsive   Respiratory Effort Unlabored   Expansion/Accessory Muscles/Retractions no use of accessory muscles   All Lung Fields Breath Sounds diminished;wheezes, expiratory   Rhythm/Pattern, Respiratory artificial airway;mechanical device;ventilator assisted   PRE-TX-O2-ETCO2   O2 Device (Oxygen Therapy) ventilator   Oxygen Concentration (%) 100   Vent Select   Conventional Vent Y   Charged w/in last 24h NO   Preset Conventional Ventilator Settings   Vent ID [id number]   Vent Type Servo i   Vent Mode PRVC   Humidity HME   Set Rate 15 bmp   Vt Set 450 mL   PEEP/CPAP 5 cmH20   Insp Time 0.9 Sec(s)   Insp Rise Time  0.15 %   Patient Ventilator Parameters   Resp Rate Total 17 br/min   Peak Airway Pressure 39.6 cmH2O   Mean Airway Pressure 28.5 cmH20   Exhaled Vt 101 mL   Total Ve 8 mL   Conventional Ventilator Alarms   Ve High Alarm 40 L/min   Ve Low Alarm 5 L/min   Resp Rate High Alarm 30 br/min   Resp Rate Low Alarm 5   Labs   $ Was an ABG obtained? Arterial Puncture;ISTAT - Blood gas   Critical Value Communication   Notified Physician/Designee Dr. Collins   Date Result Received 11/29/17   Time Result Received 0326   Resulting Department of Critical Value Resp   Who communicated critical value from resulting department? Zach Carbajal RT   Critical Test #1 CO2   Critical Test #1 Result 87   Critical Test #2 pH   Critical Test #2 Result 7.27   Date Notified 11/29/17   Time Notified 0330   Read Back Verification Yes   Physician Directive RR up to 22   Pt placed on vent at this time. Pt was orally intubated per Dr. Collins with 7.5ETT placed 23cm at lip. JOSIAH. Condensation in tube, bilateral breath sounds, positive CO2 change, and ETT confirmed via chest radiograph. Pt placed on PRVC/15/450/+5/100%. RR increased to 22 post ABG. VT decreased to 400 due to increased PIP of upper 40's.

## 2017-11-29 NOTE — ASSESSMENT & PLAN NOTE
Recent reported cardiac workup done at [place] by Dr. Ortiz. Will obtain studies. No reported CP per family. Will continue ASA, coreg and statin.

## 2017-11-29 NOTE — H&P
[facility] Hospital Medicine  History & Physical    Patient Name: Ade Castellano  MRN: [id number]  Admission Date: 11/29/2017  Attending Physician: Rachele Aguayo MD   Primary Care Provider: Jeannine Huitron MD         Patient information was obtained from relative(s), past medical records and ER records.     Subjective:     Principal Problem:Acute on chronic respiratory failure with hypoxia and hypercapnia    Chief Complaint:   Chief Complaint   Patient presents with    Respiratory Distress     pt on non-rebreather, eyes open, pt does not have purposeful movement to painful stimuli        HPI: 71 y/o female with COPD on O2 @2L, +TOB, recent diagnosis of lung mass seen on CT done at [place] (Dr. Katz, Pulmonary), SUNITHA not tolerant of CPAP, CAD s/p MI with stent, HTN, HLP, and h/o pneumonia and breast CA who presented in severe respiratory distress. Pt was reported to be hypoxic at 80% on 2.5 L home O2 by [place] upon EMS arrival.  Pt was in [facility]) from Monday 11/20 to yesterday for the Thanksgiving holiday. Sister reported the patient had progressive SOB and then she ran out of her oxygen. She went to a clinic in [place] prior to her arrival in this state and was given a steroid shot and a prescription for levofloxacin. Sister reports patient had difficulty swallowing for some time, and stated that she never reported having fever.  In the ER, her workup was remarkable for a CTA which showed a large right hilar mass with involvement of adjacent segmental pulmonary arterial branches and bronchi with associated postobstructive atelectasis and volume loss in the right middle lobe with adjacent ground glass opacity along with  mediastinal and axillary adenopathy, with a right axillary lymph node measuring up to 2.0 cm in short axis diameter. Due to her respiratory distress, she was intubated and placed on the vent.    Past Medical History:   Diagnosis Date    Acute respiratory  failure with hypoxia and hypercapnia 11/29/2017    Angina pectoris     Bell's palsy     left facial weakness    Breast cancer     RIGHT    CAD (coronary artery disease)     Cervical cancer     Chronic bronchitis     COPD (chronic obstructive pulmonary disease)     Dr. Katz    Dental bridge present     Emphysema of lung     H/O colonoscopy 06/2017    due for repeat colonsocopy in 6/2018    History of heart artery stent     Dr. Ortiz  x2 stents    Hyperlipidemia     Hypertension     Myocardial infarction     SUNITHA (obstructive sleep apnea)     intolerant to mask    Pneumonia     PUD (peptic ulcer disease)     Sleep apnea        Past Surgical History:   Procedure Laterality Date    ADENOIDECTOMY      BREAST BIOPSY Right     BREAST SURGERY      lumpectomy right side     CERVIX SURGERY      COLONOSCOPY N/A 3/17/2017    Procedure: COLONOSCOPY;  Surgeon: Julio Rudd MD;  Location: [facility] ENDO;  Service: Endoscopy;  Laterality: N/A;    COLONOSCOPY N/A 6/30/2017    Procedure: COLONOSCOPY;  Surgeon: Julio Rudd MD;  Location: [facility] ENDO;  Service: Endoscopy;  Laterality: N/A;    sweat glands axillary regions      TONSILLECTOMY         Review of patient's allergies indicates:  No Known Allergies    No current facility-administered medications on file prior to encounter.      Current Outpatient Prescriptions on File Prior to Encounter   Medication Sig    albuterol (PROVENTIL) 2.5 mg /3 mL (0.083 %) nebulizer solution NEBULIZE 1 vial EVERY 6 HOURS AS NEEDED FOR WHEEZING    albuterol (VENTOLIN HFA) 90 mcg/actuation inhaler INHALE 2 PUFF(S) BY MOUTH EVERY 4 - 6 HOURS AS NEEDED FOR SHORTNESS OF BREATH or WHEEZING    ascorbic acid (VITAMIN C) 500 MG tablet Take 500 mg by mouth once daily.    aspirin (ECOTRIN) 325 MG EC tablet Take 325 mg by mouth once daily.    epinastine 0.05 % ophthalmic solution PLACE 1 DROP(S) IN BOTH EYES TWICE DAILY    fish oil-fat acid comb.8-hb137 1,200 mg Cap Take 1 tablet by  mouth once daily.    fluticasone (FLONASE) 50 mcg/actuation nasal spray USE TWO SPRAYS IN EACH NOSTRIL ONCE A DAY    isosorbide mononitrate (IMDUR) 60 MG 24 hr tablet     metoprolol tartrate (LOPRESSOR) 25 MG tablet Take 25 mg by mouth 2 (two) times daily.     NITROSTAT 0.4 mg SL tablet place 1 tablet under the tongue AS NEEDED no more than 3 in 15 minutes    rosuvastatin (CRESTOR) 20 MG tablet Take 1 tablet (20 mg total) by mouth once daily. (Patient taking differently: Take 10 mg by mouth once daily. )    SPIRIVA RESPIMAT 2.5 mcg/actuation Mist Inhale 1 puff into the lungs once daily.    vitamin D 1000 units Tab Take 1,000 mg by mouth once daily.     Family History     Problem Relation (Age of Onset)    Breast cancer Mother    Cancer Mother, Father, Sister, Maternal Grandmother, Maternal Grandfather, Paternal Grandmother, Paternal Grandfather, Sister    Heart disease Mother, Maternal Grandmother, Maternal Grandfather        Social History Main Topics    Smoking status: Current Every Day Smoker     Packs/day: 0.25     Years: 50.00     Types: Cigarettes     Last attempt to quit: 8/23/2009    Smokeless tobacco: Never Used    Alcohol use No    Drug use: No    Sexual activity: No     Review of Systems   Unable to perform ROS: Intubated     Objective:     Vital Signs (Most Recent):  Temp: 97.8 °F (36.6 °C) (11/29/17 0730)  Pulse: 77 (11/29/17 1030)  Resp: (!) 22 (11/29/17 1030)  BP: 113/75 (11/29/17 1030)  SpO2: 100 % (11/29/17 1030) Vital Signs (24h Range):  Temp:  [97.8 °F (36.6 °C)-99.7 °F (37.6 °C)] 97.8 °F (36.6 °C)  Pulse:  [] 77  Resp:  [16-34] 22  SpO2:  [95 %-100 %] 100 %  BP: ()/() 113/75     Weight: 80.7 kg (178 lb)  Body mass index is 32.56 kg/m².    Physical Exam   Constitutional: She appears well-developed.   Chronically ill appearing.   HENT:   Head: Normocephalic and atraumatic.   ET tube in place   Eyes: Conjunctivae are normal.   Neck: Normal range of motion. Neck supple.    Cardiovascular: Normal rate and regular rhythm.    Pulmonary/Chest:   Course ventilated breath sounds, rhonchi noted on right posterior lung field with faint wheezing   Abdominal: Soft. Bowel sounds are normal.   Musculoskeletal: Normal range of motion.   Neurological:   Sedated on vent, spontaneous movement of extremities   Skin: Skin is warm and dry.           Significant Labs: All pertinent labs within the past 24 hours have been reviewed.    Significant Imaging: I have reviewed and interpreted all pertinent imaging results/findings within the past 24 hours.    Assessment/Plan:     * Acute on chronic respiratory failure with hypoxia and hypercapnia    Pt chronically on O2 at 2L at baseline for COPD and with SUNITHA, +TOB at baseline with recent new diagnosis of lung mass reported about 1 month ago done at [place]. Pt has h/o breast CA, therefore, unclear of new primary or reoccurrence of breast CA. She was scheduled for biopsy as outpatient which she will miss. Pt with very large lung mass with probable post obstructive pneumonia contributing to her respiratory failure. Pulmonary consulted and will discuss with them on antibiotics and further workup. Will wean vent as tolerated.        Lung mass    As discussed above.        COPD (chronic obstructive pulmonary disease)    As discussed above.        DNR (do not resuscitate)    Sister reported she wanted to be DNR and son in agreement with expressed wishes. Family updated on status and DNR orders written.        Tobacco abuse    Smoking cessation counseling when appropriate.        Dysphagia    Recent workup done with EGD. Will have speech evaluation post extubation.        Hyperlipidemia LDL goal <70    Continue statin.        SUNITHA (obstructive sleep apnea)    Reported to be intolerant of CPAP.        History of breast cancer    As discussed above.        CAD (coronary artery disease)    Recent reported cardiac workup done at [place] by Dr. Ortiz. Will  obtain studies. No reported CP per family. Will continue ASA, coreg and statin.        Benign hypertensive heart disease without heart failure    Continue metoprolol, ASA and statin.          VTE Risk Mitigation         Ordered     enoxaparin injection 40 mg  Daily     Route:  Subcutaneous        11/29/17 0558     Medium Risk of VTE  Once      11/29/17 0558     Place sequential compression device  Until discontinued      11/29/17 0558     Place ZAIDA hose  Until discontinued      11/29/17 0558        Critical care time spent on the evaluation and treatment of severe organ dysfunction, review of pertinent labs and imaging studies, discussions with consulting providers and discussions with patient/family: 60 minutes.     Rachele Aguayo MD  Department of Hospital Medicine   [facility]

## 2017-11-29 NOTE — LETTER
November 30, 2017                       [place] [id number]  Phone: [phone]   November 30, 2017     Patient: Ade Castellano   YOB: 1945   Date of Visit: 11/29/2017       To Whom it May Concern:    Ade Castellano was admitted to the intensive care unit on 11/29/2017and is currently under the care of a physician here. Her daughter-in-law Missy Castellano has been here with her.    If you have any questions or concerns, please don't hesitate to call.    Sincerely,       Mercedes Fernandes RN   ICU [facility].

## 2017-11-29 NOTE — ASSESSMENT & PLAN NOTE
I think her acute decompensation is likely due to COPD & pneumonia more than the lung cancer. Or treatment goal at this time is due address these problems & attempt to get her off the ventilator. Support with mechanical ventilation. Start SBT tomorrow.

## 2017-11-29 NOTE — ASSESSMENT & PLAN NOTE
Pt chronically on O2 at 2L at baseline for COPD and with SUNITHA, +TOB at baseline with recent new diagnosis of lung mass reported about 1 month ago done at [place]. Pt has h/o breast CA, therefore, unclear of new primary or reoccurrence of breast CA. She was scheduled for biopsy as outpatient which she will miss. Pt with very large lung mass with probable post obstructive pneumonia contributing to her respiratory failure. Pulmonary consulted and will discuss with them on antibiotics and further workup. Will wean vent as tolerated.

## 2017-11-29 NOTE — PLAN OF CARE
"SW met with patient's family at bedside to complete discharge needs assessment. Patient provided with "Patient [employer] Health Packet". Family reports patient prefers morning doctor appointments.       [facility] [place] [address]  [address]  [place] [address]  Phone: [phone] Fax: [phone]         11/29/17 1243   Discharge Assessment   Assessment Type Discharge Planning Assessment   Confirmed/corrected address and phone number on facesheet? Yes   Assessment information obtained from? Other  (Family members at bedside)   Communicated expected length of stay with patient/caregiver no   Prior to hospitilization cognitive status: Alert/Oriented;No Deficits   Prior to hospitalization functional status: Independent   Current cognitive status: Coma/Sedated/Intubated   Current Functional Status: Completely Dependent   Facility Arrived From: Home   Lives With alone   Able to Return to Prior Arrangements unable to determine at this time (comments)   Is patient able to care for self after discharge? Unable to determine at this time (comments)   Who are your caregiver(s) and their phone number(s)? Sherri (niece) [phone]   Readmission Within The Last 30 Days no previous admission in last 30 days   Patient currently being followed by outpatient case management? No   Patient currently receives any other outside agency services? No   Equipment Currently Used at Home oxygen;nebulizer  (Family reports patient had a C-Pap machine at home but she sent it back to DME provider)   Do you have any problems affording any of your prescribed medications? No   Is the patient taking medications as prescribed? yes   Does the patient have transportation home? Yes   Transportation Available car;family or friend will provide   Does the patient receive services at the [facility]madin Clinic? No   Discharge Plan A Skilled Nursing Facility   Discharge Plan B Home;Home Health   Patient/Family In " Agreement With Plan unable to assess

## 2017-11-29 NOTE — CONSULTS
[facility]  Critical Care Medicine  Consult Note    Patient Name: Ade Castellano  MRN: [id number]  Admission Date: 11/29/2017  Hospital Length of Stay: 0 days  Code Status: DNR  Attending Physician: Rachele Aguayo MD   Primary Care Provider: Jeannine Huitron MD   Principal Problem: Acute on chronic respiratory failure with hypoxia and hypercapnia    Consults  Subjective:     HPI:  The patient is a 72 yof with past medical history of COPD, stage 1 breast cancer in 2014 treated with lumpectomy, & a current smoker. She presented yesterday with hypoxemic & hypercapnic respiratory failure & was intubated in the ED. History is obtained from her family.    The patient was recently seen for reported symptoms of regurgitation of stomach contents when lying down & some concern that she was aspirating with these episodes. The patient underwent an EGD which was unrevealing. Tentative plan was to follow up with GI for a manometry study to evaluate for achalasia. During the course of this evaluation she had a CXR which showed a R hilar mass. This was followed up with a CT chest which revealed R hilar mass, areas of cavitation distal to this, volume loss in the RML, & axillary & mediastinal adenopathy. Our CT yesterday showed essentially the same thing. Tentative plans had been made for a biopsy today as an outpatient but her current illness has delayed these plans.    She has a history of hospitalization for COPD exacerbation but has never required mechanical ventilation previously. She uses supplemental oxygen at baseline. She uses spiriva & prn albuterol. Her pulmonologist is Dr Katz at [facility]. She suffers a chronic cough that may have been worse in the past week, but family was unclear on this. She suffers chronic dyspnea with exertion which has been significantly worse over the past week. Family denies fevers, hemoptysis, or mucus production.    The family denies any history of TB or known  exposures.    Hospital/ICU Course:  No notes on file    Past Medical History:   Diagnosis Date    Acute respiratory failure with hypoxia and hypercapnia 11/29/2017    Angina pectoris     Bell's palsy     left facial weakness    Breast cancer     RIGHT    CAD (coronary artery disease)     Cervical cancer     Chronic bronchitis     COPD (chronic obstructive pulmonary disease)     Dr. Katz    Dental bridge present     Emphysema of lung     H/O colonoscopy 06/2017    due for repeat colonsocopy in 6/2018    History of heart artery stent     Dr. Ortiz  x2 stents    Hyperlipidemia     Hypertension     Myocardial infarction     SUNITHA (obstructive sleep apnea)     intolerant to mask    Pneumonia     PUD (peptic ulcer disease)     Sleep apnea        Past Surgical History:   Procedure Laterality Date    ADENOIDECTOMY      BREAST BIOPSY Right     BREAST SURGERY      lumpectomy right side     CERVIX SURGERY      COLONOSCOPY N/A 3/17/2017    Procedure: COLONOSCOPY;  Surgeon: Julio Rudd MD;  Location: [facility] ENDO;  Service: Endoscopy;  Laterality: N/A;    COLONOSCOPY N/A 6/30/2017    Procedure: COLONOSCOPY;  Surgeon: Julio Rudd MD;  Location: [facility] ENDO;  Service: Endoscopy;  Laterality: N/A;    sweat glands axillary regions      TONSILLECTOMY         Review of patient's allergies indicates:  No Known Allergies    Family History     Problem Relation (Age of Onset)    Breast cancer Mother    Cancer Mother, Father, Sister, Maternal Grandmother, Maternal Grandfather, Paternal Grandmother, Paternal Grandfather, Sister    Heart disease Mother, Maternal Grandmother, Maternal Grandfather        Social History Main Topics    Smoking status: Current Every Day Smoker     Packs/day: 0.25     Years: 50.00     Types: Cigarettes     Last attempt to quit: 8/23/2009    Smokeless tobacco: Never Used    Alcohol use No    Drug use: No    Sexual activity: No      Review of Systems   Unable to obtain due to pt status-  intubated  Objective:     Vital Signs (Most Recent):  Temp: 97.8 °F (36.6 °C) (11/29/17 0730)  Pulse: 73 (11/29/17 1115)  Resp: 17 (11/29/17 1115)  BP: 107/66 (11/29/17 1115)  SpO2: 99 % (11/29/17 1115) Vital Signs (24h Range):  Temp:  [97.8 °F (36.6 °C)-99.7 °F (37.6 °C)] 97.8 °F (36.6 °C)  Pulse:  [] 73  Resp:  [16-34] 17  SpO2:  [95 %-100 %] 99 %  BP: ()/() 107/66   Weight: 80.7 kg (178 lb)  Body mass index is 32.56 kg/m².      Intake/Output Summary (Last 24 hours) at 11/29/17 1202  Last data filed at 11/29/17 1100   Gross per 24 hour   Intake           703.67 ml   Output              540 ml   Net           163.67 ml       Physical Exam  Physical Exam:  General: no distress  Eyes:  conjunctivae/corneas clear  Nose: no discharge  Neck: no jugular venous distention  Lungs:  + expiratory flow limitation with prolonged expiratory phase, no rales  Heart: regular rate and rhythm and no murmur  Abdomen: non-distended, soft, non-tender  Extremities: no cyanosis or edema, or clubbing  Skin: No rashes or lesions. good skin turgor  Neurologic: sedated, symmetric facies, PERRL, withdraws to painful stimuli    Vents:  Vent Mode: PRVC (11/29/17 0411)  Set Rate: 22 bmp (11/29/17 0520)  Vt Set: 400 mL (11/29/17 0520)  PEEP/CPAP: 5 cmH20 (11/29/17 0520)  Oxygen Concentration (%): 30 (11/29/17 1115)  Peak Airway Pressure: 38.3 cmH2O (11/29/17 0520)  Total Ve: 19.7 mL (11/29/17 0520)  F/VT Ratio<105 (RSBI): (!) 24.36 (11/29/17 0520)  Lines/Drains/Airways     Drain                 NG/OG Tube 11/29/17 0330 [place] sump 16 Fr. Right mouth less than 1 day         Urethral Catheter 11/29/17 0330 Double-lumen 16 Fr. less than 1 day          Airway                 Airway - Non-Surgical 11/29/17 0320 Endotracheal Tube less than 1 day          Peripheral Intravenous Line                 Peripheral IV - Single Lumen 11/29/17 0332 Left Antecubital less than 1 day         Peripheral IV - Single Lumen 11/29/17 0334 Right Wrist  less than 1 day              Significant Labs:    CBC/Anemia Profile:    Recent Labs  Lab 11/29/17  0304   WBC 11.26   HGB 14.6   HCT 45.2      MCV 94   RDW 13.4        Chemistries:    Recent Labs  Lab 11/29/17  0304      K 5.1   CL 97   CO2 33*   BUN 17   CREATININE 0.8   CALCIUM 8.9   ALBUMIN 4.2   PROT 7.4   BILITOT 0.4   ALKPHOS 49*   ALT 23   AST 33       CT reviewed- see HPI for my interpretation    Echo reviewed    Assessment/Plan:     Pulmonary   * Acute on chronic respiratory failure with hypoxia and hypercapnia    I think her acute decompensation is likely due to COPD & pneumonia more than the lung cancer. Or treatment goal at this time is due address these problems & attempt to get her off the ventilator. Support with mechanical ventilation. Start SBT tomorrow.        Cavitary pneumonia    Treat with vanc & zosyn. Follow up cultures.         Lung mass    Defer biopsy until acute issues are resolved.        COPD exacerbation    Treat with duonebs & steroids.        Other   Tobacco abuse    Needs to stop smoking.           Critical Care Time: 70 minutes  Critical care was time spent personally by me on the following activities: evaluating this patient's organ dysfunction, development of treatment plan, discussing treatment plan with patient or surrogate and bedside caregivers, discussions with consultants, evaluation of patient's response to treatment, examination of patient, ordering and performing treatments and interventions, ordering and review of laboratory studies, ordering and review of radiographic studies, re-evaluation of patient's condition. This critical care time did not overlap with that of any other provider or involve time for any procedures.    Keyon Sanchez MD  [facility]       Keyon Sanchez MD  Critical Care Medicine  [facility]

## 2017-11-29 NOTE — ED TRIAGE NOTES
"Patient presents to the ED on a nonrebreather at 15L. Patient has 100% O2 sats. Patient is not responsive to pain. Patient is not making any purposeful movements. Patient is working hard to breath at this time and is only breathing 8 times per minute. Patient's family states that that patient has been "sick" lately and they begged her to come to the hospital. Patient's home O2 sats were 87% on 2.5 L at home. Patient's family states that she was not finishing her breathing treatments at home because she could not breathe.   "

## 2017-11-29 NOTE — SUBJECTIVE & OBJECTIVE
Past Medical History:   Diagnosis Date    Acute respiratory failure with hypoxia and hypercapnia 11/29/2017    Angina pectoris     Bell's palsy     left facial weakness    Breast cancer     RIGHT    CAD (coronary artery disease)     Cervical cancer     Chronic bronchitis     COPD (chronic obstructive pulmonary disease)     Dr. Katz    Dental bridge present     Emphysema of lung     H/O colonoscopy 06/2017    due for repeat colonsocopy in 6/2018    History of heart artery stent     Dr. Ortiz  x2 stents    Hyperlipidemia     Hypertension     Myocardial infarction     SUNITHA (obstructive sleep apnea)     intolerant to mask    Pneumonia     PUD (peptic ulcer disease)     Sleep apnea        Past Surgical History:   Procedure Laterality Date    ADENOIDECTOMY      BREAST BIOPSY Right     BREAST SURGERY      lumpectomy right side     CERVIX SURGERY      COLONOSCOPY N/A 3/17/2017    Procedure: COLONOSCOPY;  Surgeon: Julio Rudd MD;  Location: [facility] ENDO;  Service: Endoscopy;  Laterality: N/A;    COLONOSCOPY N/A 6/30/2017    Procedure: COLONOSCOPY;  Surgeon: Julio Rudd MD;  Location: [facility] ENDO;  Service: Endoscopy;  Laterality: N/A;    sweat glands axillary regions      TONSILLECTOMY         Review of patient's allergies indicates:  No Known Allergies    No current facility-administered medications on file prior to encounter.      Current Outpatient Prescriptions on File Prior to Encounter   Medication Sig    albuterol (PROVENTIL) 2.5 mg /3 mL (0.083 %) nebulizer solution NEBULIZE 1 vial EVERY 6 HOURS AS NEEDED FOR WHEEZING    albuterol (VENTOLIN HFA) 90 mcg/actuation inhaler INHALE 2 PUFF(S) BY MOUTH EVERY 4 - 6 HOURS AS NEEDED FOR SHORTNESS OF BREATH or WHEEZING    ascorbic acid (VITAMIN C) 500 MG tablet Take 500 mg by mouth once daily.    aspirin (ECOTRIN) 325 MG EC tablet Take 325 mg by mouth once daily.    epinastine 0.05 % ophthalmic solution PLACE 1 DROP(S) IN BOTH EYES TWICE DAILY     fish oil-fat acid [id number] 1,200 mg Cap Take 1 tablet by mouth once daily.    fluticasone (FLONASE) 50 mcg/actuation nasal spray USE TWO SPRAYS IN EACH NOSTRIL ONCE A DAY    isosorbide mononitrate (IMDUR) 60 MG 24 hr tablet     metoprolol tartrate (LOPRESSOR) 25 MG tablet Take 25 mg by mouth 2 (two) times daily.     NITROSTAT 0.4 mg SL tablet place 1 tablet under the tongue AS NEEDED no more than 3 in 15 minutes    rosuvastatin (CRESTOR) 20 MG tablet Take 1 tablet (20 mg total) by mouth once daily. (Patient taking differently: Take 10 mg by mouth once daily. )    SPIRIVA RESPIMAT 2.5 mcg/actuation Mist Inhale 1 puff into the lungs once daily.    vitamin D 1000 units Tab Take 1,000 mg by mouth once daily.     Family History     Problem Relation (Age of Onset)    Breast cancer Mother    Cancer Mother, Father, Sister, Maternal Grandmother, Maternal Grandfather, Paternal Grandmother, Paternal Grandfather, Sister    Heart disease Mother, Maternal Grandmother, Maternal Grandfather        Social History Main Topics    Smoking status: Current Every Day Smoker     Packs/day: 0.25     Years: 50.00     Types: Cigarettes     Last attempt to quit: 8/23/2009    Smokeless tobacco: Never Used    Alcohol use No    Drug use: No    Sexual activity: No     Review of Systems   Unable to perform ROS: Intubated     Objective:     Vital Signs (Most Recent):  Temp: 97.8 °F (36.6 °C) (11/29/17 0730)  Pulse: 77 (11/29/17 1030)  Resp: (!) 22 (11/29/17 1030)  BP: 113/75 (11/29/17 1030)  SpO2: 100 % (11/29/17 1030) Vital Signs (24h Range):  Temp:  [97.8 °F (36.6 °C)-99.7 °F (37.6 °C)] 97.8 °F (36.6 °C)  Pulse:  [] 77  Resp:  [16-34] 22  SpO2:  [95 %-100 %] 100 %  BP: ()/() 113/75     Weight: 80.7 kg (178 lb)  Body mass index is 32.56 kg/m².    Physical Exam   Constitutional: She appears well-developed.   Chronically ill appearing.   HENT:   Head: Normocephalic and atraumatic.   ET tube in place   Eyes:  Conjunctivae are normal.   Neck: Normal range of motion. Neck supple.   Cardiovascular: Normal rate and regular rhythm.    Pulmonary/Chest:   Course ventilated breath sounds, rhonchi noted on right posterior lung field with faint wheezing   Abdominal: Soft. Bowel sounds are normal.   Musculoskeletal: Normal range of motion.   Neurological:   Sedated on vent, spontaneous movement of extremities   Skin: Skin is warm and dry.           Significant Labs: All pertinent labs within the past 24 hours have been reviewed.    Significant Imaging: I have reviewed and interpreted all pertinent imaging results/findings within the past 24 hours.

## 2017-11-29 NOTE — HPI
73 y/o female with COPD on O2 @2L, +TOB, recent diagnosis of lung mass seen on CT done at [place] (Dr. Katz, Pulmonary), SUNITHA not tolerant of CPAP, CAD s/p MI with stent, HTN, HLP, and h/o pneumonia and breast CA who presented in severe respiratory distress. Pt was reported to be hypoxic at 80% on 2.5 L home O2 by [place] upon EMS arrival.  Pt was in [facility]) from Monday 11/20 to yesterday for the Thanksgiving holiday. Sister reported the patient had progressive SOB and then she ran out of her oxygen. She went to a clinic in [place] prior to her arrival in this state and was given a steroid shot and a prescription for levofloxacin. Sister reports patient had difficulty swallowing for some time, and stated that she never reported having fever.  In the ER, her workup was remarkable for a CTA which showed a large right hilar mass with involvement of adjacent segmental pulmonary arterial branches and bronchi with associated postobstructive atelectasis and volume loss in the right middle lobe with adjacent ground glass opacity along with  mediastinal and axillary adenopathy, with a right axillary lymph node measuring up to 2.0 cm in short axis diameter. Due to her respiratory distress, she was intubated and placed on the vent.

## 2017-11-29 NOTE — HPI
The patient is a 72 yof with past medical history of COPD, stage 1 breast cancer in 2014 treated with lumpectomy, & a current smoker. She presented yesterday with hypoxemic & hypercapnic respiratory failure & was intubated in the ED. History is obtained from her family.    The patient was recently seen for reported symptoms of regurgitation of stomach contents when lying down & some concern that she was aspirating with these episodes. The patient underwent an EGD which was unrevealing. Tentative plan was to follow up with GI for a manometry study to evaluate for achalasia. During the course of this evaluation she had a CXR which showed a R hilar mass. This was followed up with a CT chest which revealed R hilar mass, areas of cavitation distal to this, volume loss in the RML, & axillary & mediastinal adenopathy. Our CT yesterday showed essentially the same thing. Tentative plans had been made for a biopsy as an outpatient but her current illness has delayed these plans.    She has a history of hospitalization for COPD exacerbation but has never required mechanical ventilation previously. She uses supplemental oxygen at baseline. She uses spiriva & prn albuterol. Her pulmonologist is Dr Katz at [facility]. She suffers a chronic cough that may have been worse in the past week, but family was unclear on this. She suffers chronic dyspnea with exertion which has been significantly worse over the past week. Family denies fevers, hemoptysis, or mucus production.    The family denies any history of TB or known exposures.

## 2017-11-30 LAB
ALBUMIN SERPL BCP-MCNC: 3.2 G/DL
ALLENS TEST: ABNORMAL
ALP SERPL-CCNC: 40 U/L
ALT SERPL W/O P-5'-P-CCNC: 19 U/L
ANION GAP SERPL CALC-SCNC: 7 MMOL/L
AST SERPL-CCNC: 22 U/L
BASOPHILS # BLD AUTO: 0.02 K/UL
BASOPHILS NFR BLD: 0.3 %
BILIRUB SERPL-MCNC: 0.6 MG/DL
BUN SERPL-MCNC: 10 MG/DL
CALCIUM SERPL-MCNC: 8.9 MG/DL
CHLORIDE SERPL-SCNC: 105 MMOL/L
CO2 SERPL-SCNC: 30 MMOL/L
CREAT SERPL-MCNC: 0.7 MG/DL
DELSYS: ABNORMAL
DIFFERENTIAL METHOD: ABNORMAL
EOSINOPHIL # BLD AUTO: 0 K/UL
EOSINOPHIL NFR BLD: 0 %
ERYTHROCYTE [DISTWIDTH] IN BLOOD BY AUTOMATED COUNT: 13.7 %
ERYTHROCYTE [SEDIMENTATION RATE] IN BLOOD BY WESTERGREN METHOD: 14 MM/H
EST. GFR  (AFRICAN AMERICAN): >60 ML/MIN/1.73 M^2
EST. GFR  (NON AFRICAN AMERICAN): >60 ML/MIN/1.73 M^2
FIO2: 0.3
GLUCOSE SERPL-MCNC: 174 MG/DL
HCO3 UR-SCNC: 30.1 MMOL/L (ref 24–28)
HCT VFR BLD AUTO: 40.2 %
HGB BLD-MCNC: 13 G/DL
LYMPHOCYTES # BLD AUTO: 0.8 K/UL
LYMPHOCYTES NFR BLD: 10.9 %
MAGNESIUM SERPL-MCNC: 2.5 MG/DL
MCH RBC QN AUTO: 29.9 PG
MCHC RBC AUTO-ENTMCNC: 32.3 G/DL
MCV RBC AUTO: 92 FL
MIN VOL: 7.8
MODE: ABNORMAL
MONOCYTES # BLD AUTO: 0.6 K/UL
MONOCYTES NFR BLD: 7.9 %
NEUTROPHILS # BLD AUTO: 5.7 K/UL
NEUTROPHILS NFR BLD: 80.9 %
PCO2 BLDA: 42.5 MMHG (ref 35–45)
PEEP: 5
PH SMN: 7.46 [PH] (ref 7.35–7.45)
PLATELET # BLD AUTO: 196 K/UL
PMV BLD AUTO: 9.6 FL
PO2 BLDA: 66 MMHG (ref 80–100)
POC BE: 6 MMOL/L
POC SATURATED O2: 94 % (ref 95–100)
POC TCO2: 31 MMOL/L (ref 23–27)
POTASSIUM SERPL-SCNC: 3.7 MMOL/L
PROT SERPL-MCNC: 6.2 G/DL
RBC # BLD AUTO: 4.35 M/UL
SAMPLE: ABNORMAL
SITE: ABNORMAL
SODIUM SERPL-SCNC: 142 MMOL/L
SP02: 95
VT: 400
WBC # BLD AUTO: 6.99 K/UL

## 2017-11-30 PROCEDURE — 63600175 PHARM REV CODE 636 W HCPCS: Performed by: HOSPITALIST

## 2017-11-30 PROCEDURE — 25000003 PHARM REV CODE 250: Performed by: INTERNAL MEDICINE

## 2017-11-30 PROCEDURE — 99900026 HC AIRWAY MAINTENANCE (STAT)

## 2017-11-30 PROCEDURE — 20000000 HC ICU ROOM

## 2017-11-30 PROCEDURE — 94640 AIRWAY INHALATION TREATMENT: CPT

## 2017-11-30 PROCEDURE — S0028 INJECTION, FAMOTIDINE, 20 MG: HCPCS | Performed by: EMERGENCY MEDICINE

## 2017-11-30 PROCEDURE — 27000221 HC OXYGEN, UP TO 24 HOURS

## 2017-11-30 PROCEDURE — 25000003 PHARM REV CODE 250: Performed by: HOSPITALIST

## 2017-11-30 PROCEDURE — 94003 VENT MGMT INPAT SUBQ DAY: CPT

## 2017-11-30 PROCEDURE — 99900035 HC TECH TIME PER 15 MIN (STAT)

## 2017-11-30 PROCEDURE — 63600175 PHARM REV CODE 636 W HCPCS: Performed by: INTERNAL MEDICINE

## 2017-11-30 PROCEDURE — 63600175 PHARM REV CODE 636 W HCPCS: Performed by: EMERGENCY MEDICINE

## 2017-11-30 PROCEDURE — 25000242 PHARM REV CODE 250 ALT 637 W/ HCPCS: Performed by: INTERNAL MEDICINE

## 2017-11-30 PROCEDURE — 36415 COLL VENOUS BLD VENIPUNCTURE: CPT

## 2017-11-30 PROCEDURE — 94660 CPAP INITIATION&MGMT: CPT

## 2017-11-30 PROCEDURE — 80053 COMPREHEN METABOLIC PANEL: CPT

## 2017-11-30 PROCEDURE — 83735 ASSAY OF MAGNESIUM: CPT

## 2017-11-30 PROCEDURE — 25000003 PHARM REV CODE 250: Performed by: EMERGENCY MEDICINE

## 2017-11-30 PROCEDURE — 36600 WITHDRAWAL OF ARTERIAL BLOOD: CPT

## 2017-11-30 PROCEDURE — 99291 CRITICAL CARE FIRST HOUR: CPT | Mod: ,,, | Performed by: INTERNAL MEDICINE

## 2017-11-30 PROCEDURE — 85025 COMPLETE CBC W/AUTO DIFF WBC: CPT

## 2017-11-30 PROCEDURE — 82803 BLOOD GASES ANY COMBINATION: CPT

## 2017-11-30 RX ORDER — HYDRALAZINE HYDROCHLORIDE 20 MG/ML
10 INJECTION INTRAMUSCULAR; INTRAVENOUS EVERY 6 HOURS PRN
Status: DISCONTINUED | OUTPATIENT
Start: 2017-11-30 | End: 2017-12-02

## 2017-11-30 RX ORDER — IPRATROPIUM BROMIDE AND ALBUTEROL SULFATE 2.5; .5 MG/3ML; MG/3ML
3 SOLUTION RESPIRATORY (INHALATION) EVERY 4 HOURS
Status: DISCONTINUED | OUTPATIENT
Start: 2017-11-30 | End: 2017-12-01

## 2017-11-30 RX ORDER — METOPROLOL TARTRATE 25 MG/1
25 TABLET, FILM COATED ORAL 2 TIMES DAILY
Status: DISCONTINUED | OUTPATIENT
Start: 2017-11-30 | End: 2017-12-02

## 2017-11-30 RX ORDER — IPRATROPIUM BROMIDE AND ALBUTEROL SULFATE 2.5; .5 MG/3ML; MG/3ML
3 SOLUTION RESPIRATORY (INHALATION) ONCE
Status: COMPLETED | OUTPATIENT
Start: 2017-11-30 | End: 2017-11-30

## 2017-11-30 RX ORDER — RAMELTEON 8 MG/1
8 TABLET ORAL NIGHTLY PRN
Status: DISCONTINUED | OUTPATIENT
Start: 2017-11-30 | End: 2017-12-05 | Stop reason: HOSPADM

## 2017-11-30 RX ADMIN — IPRATROPIUM BROMIDE AND ALBUTEROL SULFATE 3 ML: .5; 3 SOLUTION RESPIRATORY (INHALATION) at 11:11

## 2017-11-30 RX ADMIN — METOPROLOL TARTRATE 25 MG: 25 TABLET ORAL at 09:11

## 2017-11-30 RX ADMIN — METHYLPREDNISOLONE SODIUM SUCCINATE 80 MG: 40 INJECTION, POWDER, FOR SOLUTION INTRAMUSCULAR; INTRAVENOUS at 05:11

## 2017-11-30 RX ADMIN — METHYLPREDNISOLONE SODIUM SUCCINATE 80 MG: 40 INJECTION, POWDER, FOR SOLUTION INTRAMUSCULAR; INTRAVENOUS at 09:11

## 2017-11-30 RX ADMIN — VANCOMYCIN HYDROCHLORIDE 1250 MG: 1 INJECTION, POWDER, LYOPHILIZED, FOR SOLUTION INTRAVENOUS at 12:11

## 2017-11-30 RX ADMIN — ASPIRIN 81 MG CHEWABLE TABLET 81 MG: 81 TABLET CHEWABLE at 09:11

## 2017-11-30 RX ADMIN — VANCOMYCIN HYDROCHLORIDE 1250 MG: 1 INJECTION, POWDER, LYOPHILIZED, FOR SOLUTION INTRAVENOUS at 02:11

## 2017-11-30 RX ADMIN — CIPROFLOXACIN HYDROCHLORIDE 1 DROP: 3 SOLUTION/ DROPS OPHTHALMIC at 09:11

## 2017-11-30 RX ADMIN — IPRATROPIUM BROMIDE AND ALBUTEROL SULFATE 3 ML: .5; 3 SOLUTION RESPIRATORY (INHALATION) at 02:11

## 2017-11-30 RX ADMIN — PIPERACILLIN AND TAZOBACTAM 4.5 G: 4; .5 INJECTION, POWDER, LYOPHILIZED, FOR SOLUTION INTRAVENOUS; PARENTERAL at 08:11

## 2017-11-30 RX ADMIN — PROPOFOL 40 MCG/KG/MIN: 10 INJECTION, EMULSION INTRAVENOUS at 09:11

## 2017-11-30 RX ADMIN — METHYLPREDNISOLONE SODIUM SUCCINATE 80 MG: 40 INJECTION, POWDER, FOR SOLUTION INTRAMUSCULAR; INTRAVENOUS at 01:11

## 2017-11-30 RX ADMIN — CIPROFLOXACIN HYDROCHLORIDE 1 DROP: 3 SOLUTION/ DROPS OPHTHALMIC at 01:11

## 2017-11-30 RX ADMIN — CIPROFLOXACIN HYDROCHLORIDE 1 DROP: 3 SOLUTION/ DROPS OPHTHALMIC at 05:11

## 2017-11-30 RX ADMIN — HYDRALAZINE HYDROCHLORIDE 10 MG: 20 INJECTION INTRAMUSCULAR; INTRAVENOUS at 11:11

## 2017-11-30 RX ADMIN — RAMELTEON 8 MG: 8 TABLET, FILM COATED ORAL at 09:11

## 2017-11-30 RX ADMIN — ENOXAPARIN SODIUM 40 MG: 100 INJECTION SUBCUTANEOUS at 05:11

## 2017-11-30 RX ADMIN — PROPOFOL 25 MCG/KG/MIN: 10 INJECTION, EMULSION INTRAVENOUS at 04:11

## 2017-11-30 RX ADMIN — POLYETHYLENE GLYCOL 3350 17 G: 17 POWDER, FOR SOLUTION ORAL at 09:11

## 2017-11-30 RX ADMIN — CIPROFLOXACIN HYDROCHLORIDE 1 DROP: 3 SOLUTION/ DROPS OPHTHALMIC at 02:11

## 2017-11-30 RX ADMIN — ROSUVASTATIN CALCIUM 20 MG: 10 TABLET, FILM COATED ORAL at 09:11

## 2017-11-30 RX ADMIN — CIPROFLOXACIN HYDROCHLORIDE 1 DROP: 3 SOLUTION/ DROPS OPHTHALMIC at 07:11

## 2017-11-30 RX ADMIN — IPRATROPIUM BROMIDE AND ALBUTEROL SULFATE 3 ML: .5; 3 SOLUTION RESPIRATORY (INHALATION) at 04:11

## 2017-11-30 RX ADMIN — FAMOTIDINE 20 MG: 10 INJECTION INTRAVENOUS at 09:11

## 2017-11-30 RX ADMIN — HYDRALAZINE HYDROCHLORIDE 10 MG: 20 INJECTION INTRAMUSCULAR; INTRAVENOUS at 01:11

## 2017-11-30 RX ADMIN — PIPERACILLIN AND TAZOBACTAM 4.5 G: 4; .5 INJECTION, POWDER, LYOPHILIZED, FOR SOLUTION INTRAVENOUS; PARENTERAL at 12:11

## 2017-11-30 RX ADMIN — IPRATROPIUM BROMIDE AND ALBUTEROL SULFATE 3 ML: .5; 3 SOLUTION RESPIRATORY (INHALATION) at 08:11

## 2017-11-30 RX ADMIN — PIPERACILLIN AND TAZOBACTAM 4.5 G: 4; .5 INJECTION, POWDER, LYOPHILIZED, FOR SOLUTION INTRAVENOUS; PARENTERAL at 05:11

## 2017-11-30 NOTE — PROGRESS NOTES
[facility]  Critical Care Medicine  Progress Note    Patient Name: Ade Castellano  MRN: [id number]  Admission Date: 11/29/2017  Hospital Length of Stay: 1 days  Code Status: DNR  Attending Provider: Rachele Aguayo MD  Primary Care Provider: Jeannine Huitron MD   Principal Problem: Acute on chronic respiratory failure with hypoxia and hypercapnia    Subjective:     HPI:  The patient is a 72 yof with past medical history of COPD, stage 1 breast cancer in 2014 treated with lumpectomy, & a current smoker. She presented yesterday with hypoxemic & hypercapnic respiratory failure & was intubated in the ED. History is obtained from her family.    The patient was recently seen for reported symptoms of regurgitation of stomach contents when lying down & some concern that she was aspirating with these episodes. The patient underwent an EGD which was unrevealing. Tentative plan was to follow up with GI for a manometry study to evaluate for achalasia. During the course of this evaluation she had a CXR which showed a R hilar mass. This was followed up with a CT chest which revealed R hilar mass, areas of cavitation distal to this, volume loss in the RML, & axillary & mediastinal adenopathy. Our CT yesterday showed essentially the same thing. Tentative plans had been made for a biopsy as an outpatient but her current illness has delayed these plans.    She has a history of hospitalization for COPD exacerbation but has never required mechanical ventilation previously. She uses supplemental oxygen at baseline. She uses spiriva & prn albuterol. Her pulmonologist is Dr Katz at [facility]. She suffers a chronic cough that may have been worse in the past week, but family was unclear on this. She suffers chronic dyspnea with exertion which has been significantly worse over the past week. Family denies fevers, hemoptysis, or mucus production.    The family denies any history of TB or known exposures.    Hospital/ICU  Course:  No significant events overnight. Nursing reports episodes of agitation when sedation holiday is attempted.    Review of Systems   Unable to obtain due to patient status.    Objective:     Vital Signs (Most Recent):  Temp: 97.9 °F (36.6 °C) (11/30/17 0800)  Pulse: 64 (11/30/17 0930)  Resp: (!) 47 (11/30/17 0930)  BP: (!) 142/71 (11/30/17 0930)  SpO2: (!) 83 % (11/30/17 0930) Vital Signs (24h Range):  Temp:  [97.9 °F (36.6 °C)-100.6 °F (38.1 °C)] 97.9 °F (36.6 °C)  Pulse:  [54-77] 64  Resp:  [14-47] 47  SpO2:  [83 %-100 %] 83 %  BP: ()/(56-80) 142/71   Weight: 80.7 kg (178 lb)  Body mass index is 32.56 kg/m².      Intake/Output Summary (Last 24 hours) at 11/30/17 0941  Last data filed at 11/30/17 0600   Gross per 24 hour   Intake           2079.5 ml   Output             1480 ml   Net            599.5 ml       Physical Exam  General: no distress  Eyes:  conjunctivae/corneas clear  Nose: no discharge   Neck: no jugular venous distention  Lungs:  expiratory flow limitation is improved today no rales  Heart: regular rate and rhythm and no murmur  Abdomen: non-distended, soft, non-tender  Extremities: no cyanosis or edema, or clubbing  Skin: No rashes or lesions. good skin turgor  Neurologic: sedated, symmetric facies, PERRL, withdraws to painful stimuli  Vents:  Vent Mode: PRVC (11/30/17 0416)  Set Rate: 14 bmp (11/30/17 0416)  Vt Set: 400 mL (11/30/17 0416)  PEEP/CPAP: 5 cmH20 (11/30/17 0416)  Oxygen Concentration (%): 30 (11/30/17 0930)  Peak Airway Pressure: 43.2 cmH2O (11/30/17 0416)  Total Ve: 8.1 mL (11/30/17 0416)  F/VT Ratio<105 (RSBI): 395.83 (11/30/17 0416)  Lines/Drains/Airways     Drain                 NG/OG Tube 11/29/17 0330 [place] sump 16 Fr. Right mouth 1 day         Urethral Catheter 11/29/17 0330 Double-lumen 16 Fr. 1 day          Airway                 Airway - Non-Surgical 11/29/17 0320 Endotracheal Tube 1 day          Peripheral Intravenous Line                 Peripheral IV - Single  Lumen 11/29/17 0332 Left Antecubital 1 day         Peripheral IV - Single Lumen 11/29/17 0334 Right Wrist 1 day              Significant Labs:    CBC/Anemia Profile:    Recent Labs  Lab 11/29/17  0304 11/30/17  0201   WBC 11.26 6.99   HGB 14.6 13.0   HCT 45.2 40.2    196   MCV 94 92   RDW 13.4 13.7        Chemistries:    Recent Labs  Lab 11/29/17  0304 11/30/17  0201    142   K 5.1 3.7   CL 97 105   CO2 33* 30*   BUN 17 10   CREATININE 0.8 0.7   CALCIUM 8.9 8.9   ALBUMIN 4.2 3.2*   PROT 7.4 6.2   BILITOT 0.4 0.6   ALKPHOS 49* 40*   ALT 23 19   AST 33 22   MG  --  2.5     CT reviewed- see HPI for my interpretation     Echo reviewed    CXR from today- no interval change      Assessment/Plan:     Pulmonary   * Acute on chronic respiratory failure with hypoxia and hypercapnia    I think her acute decompensation is likely due to COPD & pneumonia more than the lung cancer. Our treatment goal at this time is due address these problems & attempt to get her off the ventilator. She passed her SBT this morning, so we extubated her to bipap. Will wean off bipap as tolerated over the next 24 hours.        Cavitary pneumonia    Treat with vanc & zosyn. Sputum culture showing GPCs; continue to monitor.         Lung mass    Defer biopsy until acute issues are resolved.        COPD exacerbation    Treat with duonebs & steroids.        Other   Tobacco abuse    Needs to stop smoking.        Critical Care Time: 45 minutes  Critical care was time spent personally by me on the following activities: evaluating this patient's organ dysfunction, development of treatment plan, discussing treatment plan with patient or surrogate and bedside caregivers, discussions with consultants, extubating the patient at the bedside, evaluation of patient's response to treatment, examination of patient, ordering and performing treatments and interventions, ordering and review of laboratory studies, ordering and review of radiographic studies,  re-evaluation of patient's condition. This critical care time did not overlap with that of any other provider or involve time for any procedures.       Keyon Sanchez MD  Critical Care Medicine  [facility]

## 2017-11-30 NOTE — PROGRESS NOTES
Pt extubated and placed on 10/5 40% BIPAP. Tolerating well at this time, will continue to monitor.

## 2017-11-30 NOTE — EICU
Vent Day #3    73 y/o F SUNITHA not compliant with CPAP, COPD current smoker, stage 1 breast cancer in 2014 treated with lumpectomy. Recently diagnosed with R lung mass.  She presented with hypoxemic & hypercapnic respiratory failure & was intubated in the Ed.    CXR persistent R hilar mass, ET tube about 2cm above the rose marie    On PRVC rate 14  PEEP 5 FiO2 30%     11/30/2017 04:16   POC PH 7.458 (H)   POC PCO2 42.5   POC PO2 66 (L)   POC BE 6   POC HCO3 30.1 (H)   POC SATURATED O2 94 (L)   POC TCO2 31 (H)   FiO2 0.30   Vt 400     · Daily sedation holiday and spontaneous breathing trial

## 2017-11-30 NOTE — PROGRESS NOTES
1000 Pt extubated to Bipap with RT and MD Sanchez at bedside.   1007 Tolerating bipap. Sats 96% on 40% FiO2. RR 27. Denies pain.

## 2017-11-30 NOTE — ASSESSMENT & PLAN NOTE
Pt chronically on O2 at 2L at baseline for COPD and with SUNITHA, +TOB at baseline with recent new diagnosis of lung mass reported about 1 month ago done at [place]. Pt has h/o breast CA, therefore, unclear of new primary or reoccurrence of breast CA. She was scheduled for biopsy as outpatient. Pt with very large lung mass with probable post obstructive pneumonia and COPD exacerbation which resulted in acute failure. Pt was treated with vent support, antibiotics of Zosyn/Cipro/Vancomycin, IV steroids as per Pulmonary. Pt doing well, SBT in progress with likely extubation later today.

## 2017-11-30 NOTE — PLAN OF CARE
Problem: Patient Care Overview  Goal: Plan of Care Review  Outcome: Ongoing (interventions implemented as appropriate)  Pt remains in ICU on ventilator. Propofol for sedation. Tube feedings initiated today. Low grade fever this afternoon. [place] with good output. Pulmonary on the case. Remains free from fall, injury or breakdown throughout shift.

## 2017-11-30 NOTE — PROGRESS NOTES
Results for CHOLO HARRIS (MRN [id number]) as of 11/30/2017 05:20   Ref. Range 11/30/2017 04:16   POC PH Latest Ref Range: 7.35 - 7.45  7.458 (H)   POC PCO2 Latest Ref Range: 35 - 45 mmHg 42.5   POC PO2 Latest Ref Range: 80 - 100 mmHg 66 (L)   POC BE Latest Ref Range: -2 to 2 mmol/L 6   POC HCO3 Latest Ref Range: 24 - 28 mmol/L 30.1 (H)   POC SATURATED O2 Latest Ref Range: 95 - 100 % 94 (L)   POC TCO2 Latest Ref Range: 23 - 27 mmol/L 31 (H)   FiO2 Unknown 0.30   Vt Unknown 400   PEEP Unknown 5   Sample Unknown ARTERIAL   DelSys Unknown Adult Vent   Allens Test Unknown Pass   Site Unknown RR   Mode Unknown AC/PRVC

## 2017-11-30 NOTE — SUBJECTIVE & OBJECTIVE
Review of Systems   Unable to obtain due to patient status.    Objective:     Vital Signs (Most Recent):  Temp: 97.9 °F (36.6 °C) (11/30/17 0800)  Pulse: 64 (11/30/17 0930)  Resp: (!) 47 (11/30/17 0930)  BP: (!) 142/71 (11/30/17 0930)  SpO2: (!) 83 % (11/30/17 0930) Vital Signs (24h Range):  Temp:  [97.9 °F (36.6 °C)-100.6 °F (38.1 °C)] 97.9 °F (36.6 °C)  Pulse:  [54-77] 64  Resp:  [14-47] 47  SpO2:  [83 %-100 %] 83 %  BP: ()/(56-80) 142/71   Weight: 80.7 kg (178 lb)  Body mass index is 32.56 kg/m².      Intake/Output Summary (Last 24 hours) at 11/30/17 0941  Last data filed at 11/30/17 0600   Gross per 24 hour   Intake           2079.5 ml   Output             1480 ml   Net            599.5 ml       Physical Exam  General: no distress  Eyes:  conjunctivae/corneas clear  Nose: no discharge   Neck: no jugular venous distention  Lungs:  expiratory flow limitation is improved today no rales  Heart: regular rate and rhythm and no murmur  Abdomen: non-distended, soft, non-tender  Extremities: no cyanosis or edema, or clubbing  Skin: No rashes or lesions. good skin turgor  Neurologic: sedated, symmetric facies, PERRL, withdraws to painful stimuli  Vents:  Vent Mode: PRVC (11/30/17 0416)  Set Rate: 14 bmp (11/30/17 0416)  Vt Set: 400 mL (11/30/17 0416)  PEEP/CPAP: 5 cmH20 (11/30/17 0416)  Oxygen Concentration (%): 30 (11/30/17 0930)  Peak Airway Pressure: 43.2 cmH2O (11/30/17 0416)  Total Ve: 8.1 mL (11/30/17 0416)  F/VT Ratio<105 (RSBI): 395.83 (11/30/17 0416)  Lines/Drains/Airways     Drain                 NG/OG Tube 11/29/17 0330 [place] sump 16 Fr. Right mouth 1 day         Urethral Catheter 11/29/17 0330 Double-lumen 16 Fr. 1 day          Airway                 Airway - Non-Surgical 11/29/17 0320 Endotracheal Tube 1 day          Peripheral Intravenous Line                 Peripheral IV - Single Lumen 11/29/17 0332 Left Antecubital 1 day         Peripheral IV - Single Lumen 11/29/17 0334 Right Wrist 1 day               Significant Labs:    CBC/Anemia Profile:    Recent Labs  Lab 11/29/17  0304 11/30/17  0201   WBC 11.26 6.99   HGB 14.6 13.0   HCT 45.2 40.2    196   MCV 94 92   RDW 13.4 13.7        Chemistries:    Recent Labs  Lab 11/29/17 0304 11/30/17  0201    142   K 5.1 3.7   CL 97 105   CO2 33* 30*   BUN 17 10   CREATININE 0.8 0.7   CALCIUM 8.9 8.9   ALBUMIN 4.2 3.2*   PROT 7.4 6.2   BILITOT 0.4 0.6   ALKPHOS 49* 40*   ALT 23 19   AST 33 22   MG  --  2.5     CT reviewed- see HPI for my interpretation     Echo reviewed    CXR from today- no interval change

## 2017-11-30 NOTE — ASSESSMENT & PLAN NOTE
I think her acute decompensation is likely due to COPD & pneumonia more than the lung cancer. Our treatment goal at this time is due address these problems & attempt to get her off the ventilator. She passed her SBT this morning, so we extubated her to bipap. Will wean off bipap as tolerated over the next 24 hours.

## 2017-11-30 NOTE — SUBJECTIVE & OBJECTIVE
Interval History: Pt with no acute events overnight, tolerating trickle feeds, and wean of vent support. SBT in progress.    Review of Systems   Unable to perform ROS: Intubated     Objective:     Vital Signs (Most Recent):  Temp: 98.8 °F (37.1 °C) (11/30/17 0300)  Pulse: (!) 57 (11/30/17 0900)  Resp: 19 (11/30/17 0900)  BP: 139/75 (11/30/17 0900)  SpO2: 96 % (11/30/17 0900) Vital Signs (24h Range):  Temp:  [98 °F (36.7 °C)-100.6 °F (38.1 °C)] 98.8 °F (37.1 °C)  Pulse:  [54-77] 57  Resp:  [14-34] 19  SpO2:  [93 %-100 %] 96 %  BP: ()/(56-80) 139/75     Weight: 80.7 kg (178 lb)  Body mass index is 32.56 kg/m².    Intake/Output Summary (Last 24 hours) at 11/30/17 0929  Last data filed at 11/30/17 0600   Gross per 24 hour   Intake           2079.5 ml   Output             1480 ml   Net            599.5 ml      Physical Exam   Constitutional: She appears well-developed.   Chronically ill appearing.   HENT:   Head: Normocephalic and atraumatic.   ET tube in place   Eyes: Conjunctivae are normal.   Neck: Normal range of motion. Neck supple.   Cardiovascular: Normal rate and regular rhythm.    Pulmonary/Chest:   Course ventilated breath sounds, rhonchi noted on right posterior lung field with faint wheezing   Abdominal: Soft. Bowel sounds are normal.   Musculoskeletal: Normal range of motion.   Neurological:   Sedated on vent, spontaneous movement of extremities   Skin: Skin is warm and dry.       Significant Labs: All pertinent labs within the past 24 hours have been reviewed.    Significant Imaging: I have reviewed and interpreted all pertinent imaging results/findings within the past 24 hours.

## 2017-11-30 NOTE — PROGRESS NOTES
[facility]  Adult Nutrition  Consult Note    SUMMARY     Recommendations    Recommendation/Intervention:   1. With current propofol rate rec TF: peptamen Bariatric to goal of 40 ml/hr to better meet protein needs    -TF to provide: 960, 88 g pro, 806 ml fluid.   -FLuid flushes for normal fluid intake: 165 ml q 6 hrs   -Hold for TF >250 ml or sx/o n/v/abd discomfort; HOB >30   -TF to provide: 960 kcal, 88 g pro, 806 ml fluid (1472 kcal with propofol).   2. S/p extubation advance diet with texture per SLP to Cardiac  3. RD to monitor    Goals: Meet 85% EEN  Nutrition Goal Status: new  Communication of RD Recs: discussed on rounds    Continuum of Care Plan    Referral to Outpatient Services:  (D/C planning: Too soon to determine)    Reason for Assessment    Reason for Assessment: new tube feeding  Diagnosis:  (Resp failure)  Relevent Medical History: COPD; Lung mass; breast Ca (stage I)   Interdisciplinary Rounds: did not attend     General Information Comments: Patient intubated; on propofol. TF initiated and running at trickle rate at this time.      Nutrition Prescription Ordered    Current Diet Order: NPO     Current Nutrition Support Formula Ordered: Isosource 1.5  Current Nutrition Support Rate Ordered: 10 (ml)  Current Nutrition Support Frequency Ordered: ml/hr      Evaluation of Received Nutrients/Fluid Intake    Enteral Calories (kcal): 360  Enteral Protein (gm): 16.5  Enteral (Free Water) Fluid (mL): 184   Lipid Calories (kcals): 512 kcals (propofol)  Total kcal intake: 872 kcal     Energy Calories Required: not meeting needs  % Kcal Needs: 80%     Protein Required: not meeting needs  % Protein Needs: 20%        I/O: 2516/1650       Fluid Required:  (per MD (intubation))  Comments: LBM: 11/29  Tolerance: tolerating    Nutrition Risk Screen     Nutrition Risk Screen: no indicators present    Nutrition/Diet History     Food Preferences: JAYDEN   Factors Affecting Nutritional Intake: NPO, on  "mechanical ventilation     Labs/Tests/Procedures/Meds     Pertinent Labs Reviewed: reviewed   Pertinent Medications Reviewed: reviewed     Physical Findings    Overall Physical Appearance: obese, on ventilator support  Tubes: orogastric tube  Oral/Mouth Cavity: tooth/teeth missing  Skin: intact    Anthropometrics    Temp: 97.9 °F (36.6 °C)     Height: 5' 2" (157.5 cm)  Weight Method: Bed Scale  Weight: 80.7 kg (178 lb)     Ideal Body Weight (IBW), Female: 110 lb     % Ideal Body Weight, Female (lb): 161.82 lb  BMI (Calculated): 32.6  BMI Grade: 30 - 34.9- obesity - grade I    Estimated/Assessed Needs    Weight Used For Calorie Calculations: 80.7 kg (177 lb 14.6 oz)      Energy Calorie Requirements (kcal): 1552 (1086= 70%)  Energy Need Method: [facility] (modified)     RMR ([place]-St. Jeor Equation): 1275.25    Weight Used For Protein Calculations: 50 kg (110 lb 3.7 oz) (IBW)  Protein Requirements: 80-100g     Fluid Need Method: RDA Method     RDA Method (mL): 1552      Assessment and Plan    Nutrition Dx: Inadequate Energy Intake r/t mechanical ventilation as evidenced by: NPO/TF meeting 20% of protein needs  Nutrition Dx Status: New      Monitor and Evaluation    Food and Nutrient Intake: energy intake, enteral nutrition intake  Food and Nutrient Adminstration: diet order, enteral and parenteral nutrition administration  Knowledge/Beliefs/Attitudes: food and nutrition knowledge/skill  Physical Activity and Function: nutrition-related ADLs and IADLs  Anthropometric Measurements: weight, weight change  Biochemical Data, Medical Tests and Procedures: electrolyte and renal panel  Nutrition-Focused Physical Findings: overall appearance    Nutrition Risk    Level of Risk:  (2 x week)    Nutrition Follow-Up    RD Follow-up?: Yes          "

## 2017-11-30 NOTE — HOSPITAL COURSE
"Ms. Castellano was admitted for acute on chronic respiratory failure requiring intubation and ventilation. Has large lung mass in right lung with probable post obstructive pneumonia resulting in COPD exacerbation. But also, patient had ran out of home O2 prior to onset of symptoms, likely contributing to presentation. Initiated on broad spectrum abx, IV steroids, duo-nebs and pulmonology consulted for ventilator co-management. Successfully extubated to BiPAP on 11/30. Then de-escalated to low flow nasal cannula. Abx tailored to augmentin for broad coverage, including anaerobic bacteria as patient did report an episode of "food coming up while I was sleeping". Speech therapy cleared for dental soft with thin liquids and to keep HOB elevated for at least 30 mins after each meal. No issues with PO intake. Short term steroid treatment. Smoking cessation strongly encouraged. Stepped down to floor on 12/2. PT/OT evaluated the patient and recommend home with H/H.  The rest of the hospital course was unremarkable. The patient will be discharged to home today. Activity as tolerated. Diet- low NA. Follow up with PCP in one week. Steroid taper.   "

## 2017-11-30 NOTE — PROGRESS NOTES
[facility] Medicine  Progress Note    Patient Name: Ade Castellano  MRN: [id number]  Patient Class: IP- Inpatient   Admission Date: 11/29/2017  Length of Stay: 1 days  Attending Physician: Rachele Aguayo MD  Primary Care Provider: Jeannine Huitron MD        Subjective:     Principal Problem:Acute on chronic respiratory failure with hypoxia and hypercapnia    HPI:  71 y/o female with COPD on O2 @2L, +TOB, recent diagnosis of lung mass seen on CT done at [place] (Dr. Katz, Pulmonary), SUNITHA not tolerant of CPAP, CAD s/p MI with stent, HTN, HLP, and h/o pneumonia and breast CA who presented in severe respiratory distress. Pt was reported to be hypoxic at 80% on 2.5 L home O2 by NC upon EMS arrival.  Pt was in [facility]) from Monday 11/20 to yesterday for the Thanksgiving holiday. Sister reported the patient had progressive SOB and then she ran out of her oxygen. She went to a clinic in [place] prior to her arrival in this state and was given a steroid shot and a prescription for levofloxacin. Sister reports patient had difficulty swallowing for some time, and stated that she never reported having fever.  In the ER, her workup was remarkable for a CTA which showed a large right hilar mass with involvement of adjacent segmental pulmonary arterial branches and bronchi with associated postobstructive atelectasis and volume loss in the right middle lobe with adjacent ground glass opacity along with  mediastinal and axillary adenopathy, with a right axillary lymph node measuring up to 2.0 cm in short axis diameter. Due to her respiratory distress, she was intubated and placed on the vent.    Hospital Course:  Ms. Castellano was admitted for acute on chronic respiratory failure requiring intubation and ventilation. Pt with large lung mass with probable post-obstructive pneumonia and COPD exacerbation which resulted in acute failure. Pt was treated with vent support, antibiotics  of Zosyn/Cipro/Vancomycin, IV steroids as per Pulmonary.    Interval History: Pt with no acute events overnight, tolerating trickle feeds, and wean of vent support. SBT in progress.    Review of Systems   Unable to perform ROS: Intubated     Objective:     Vital Signs (Most Recent):  Temp: 98.8 °F (37.1 °C) (11/30/17 0300)  Pulse: (!) 57 (11/30/17 0900)  Resp: 19 (11/30/17 0900)  BP: 139/75 (11/30/17 0900)  SpO2: 96 % (11/30/17 0900) Vital Signs (24h Range):  Temp:  [98 °F (36.7 °C)-100.6 °F (38.1 °C)] 98.8 °F (37.1 °C)  Pulse:  [54-77] 57  Resp:  [14-34] 19  SpO2:  [93 %-100 %] 96 %  BP: ()/(56-80) 139/75     Weight: 80.7 kg (178 lb)  Body mass index is 32.56 kg/m².    Intake/Output Summary (Last 24 hours) at 11/30/17 0929  Last data filed at 11/30/17 0600   Gross per 24 hour   Intake           2079.5 ml   Output             1480 ml   Net            599.5 ml      Physical Exam   Constitutional: She appears well-developed.   Chronically ill appearing.   HENT:   Head: Normocephalic and atraumatic.   ET tube in place   Eyes: Conjunctivae are normal.   Neck: Normal range of motion. Neck supple.   Cardiovascular: Normal rate and regular rhythm.    Pulmonary/Chest:   Course ventilated breath sounds, rhonchi noted on right posterior lung field with faint wheezing   Abdominal: Soft. Bowel sounds are normal.   Musculoskeletal: Normal range of motion.   Neurological:   Sedated on vent, spontaneous movement of extremities   Skin: Skin is warm and dry.       Significant Labs: All pertinent labs within the past 24 hours have been reviewed.    Significant Imaging: I have reviewed and interpreted all pertinent imaging results/findings within the past 24 hours.    Assessment/Plan:      * Acute on chronic respiratory failure with hypoxia and hypercapnia    Pt chronically on O2 at 2L at baseline for COPD and with SUNITHA, +TOB at baseline with recent new diagnosis of lung mass reported about 1 month ago done at [place]. Pt  has h/o breast CA, therefore, unclear of new primary or reoccurrence of breast CA. She was scheduled for biopsy as outpatient. Pt with very large lung mass with probable post obstructive pneumonia and COPD exacerbation which resulted in acute failure. Pt was treated with vent support, antibiotics of Zosyn/Cipro/Vancomycin, IV steroids as per Pulmonary. Pt doing well, SBT in progress with likely extubation later today.        Lung mass    As discussed above. Biopsy deferred to outpatient workup as discussed with Pulmonary.        COPD exacerbation    As discussed above.        Cavitary pneumonia    As discussed above, on antibiotics as per Pulmonary.        DNR (do not resuscitate)    Sister reported she wanted to be DNR and son in agreement with expressed wishes. Family updated on status and DNR orders written.        Tobacco abuse    Smoking cessation counseling when appropriate.        Dysphagia    Recent workup done with EGD. Will have speech evaluation post extubation.        Hyperlipidemia LDL goal <70    Continue statin.        SUNITHA (obstructive sleep apnea)    Reported to be intolerant of CPAP.        History of breast cancer    As discussed above.        CAD (coronary artery disease)    Recent reported cardiac workup done at [place] by Dr. Ortiz. Will obtain studies. No reported CP per family. Will continue ASA, coreg and statin.        Benign hypertensive heart disease without heart failure    Continue metoprolol, ASA and statin.          VTE Risk Mitigation         Ordered     enoxaparin injection 40 mg  Daily     Route:  Subcutaneous        11/29/17 0558     Medium Risk of VTE  Once      11/29/17 0558     Place sequential compression device  Until discontinued      11/29/17 0558     Place ZAIDA hose  Until discontinued      11/29/17 0558          Critical care time spent on the evaluation and treatment of severe organ dysfunction, review of pertinent labs and imaging studies, discussions with  consulting providers and discussions with patient/family: 35 minutes.    Rachele Aguayo MD  Department of Hospital Medicine   [facility]

## 2017-12-01 PROBLEM — R13.19 ESOPHAGEAL DYSPHAGIA: Status: ACTIVE | Noted: 2017-10-11

## 2017-12-01 LAB
ALBUMIN SERPL BCP-MCNC: 3.2 G/DL
ALP SERPL-CCNC: 48 U/L
ALT SERPL W/O P-5'-P-CCNC: 45 U/L
ANION GAP SERPL CALC-SCNC: 10 MMOL/L
AST SERPL-CCNC: 42 U/L
BACTERIA SPEC AEROBE CULT: NORMAL
BACTERIA UR CULT: NO GROWTH
BASOPHILS # BLD AUTO: 0.01 K/UL
BASOPHILS NFR BLD: 0.1 %
BILIRUB SERPL-MCNC: 0.5 MG/DL
BUN SERPL-MCNC: 23 MG/DL
CALCIUM SERPL-MCNC: 9.2 MG/DL
CHLORIDE SERPL-SCNC: 106 MMOL/L
CO2 SERPL-SCNC: 31 MMOL/L
CREAT SERPL-MCNC: 1 MG/DL
DIFFERENTIAL METHOD: ABNORMAL
EOSINOPHIL # BLD AUTO: 0 K/UL
EOSINOPHIL NFR BLD: 0 %
ERYTHROCYTE [DISTWIDTH] IN BLOOD BY AUTOMATED COUNT: 13.8 %
EST. GFR  (AFRICAN AMERICAN): >60 ML/MIN/1.73 M^2
EST. GFR  (NON AFRICAN AMERICAN): 57 ML/MIN/1.73 M^2
GLUCOSE SERPL-MCNC: 166 MG/DL
GRAM STN SPEC: NORMAL
HCT VFR BLD AUTO: 41.5 %
HGB BLD-MCNC: 13.6 G/DL
LYMPHOCYTES # BLD AUTO: 0.8 K/UL
LYMPHOCYTES NFR BLD: 9.8 %
MAGNESIUM SERPL-MCNC: 2.9 MG/DL
MCH RBC QN AUTO: 30.2 PG
MCHC RBC AUTO-ENTMCNC: 32.8 G/DL
MCV RBC AUTO: 92 FL
MONOCYTES # BLD AUTO: 0.6 K/UL
MONOCYTES NFR BLD: 7.4 %
NEUTROPHILS # BLD AUTO: 6.7 K/UL
NEUTROPHILS NFR BLD: 82.7 %
PLATELET # BLD AUTO: 203 K/UL
PMV BLD AUTO: 9.8 FL
POTASSIUM SERPL-SCNC: 3.6 MMOL/L
PROT SERPL-MCNC: 6.6 G/DL
RBC # BLD AUTO: 4.5 M/UL
SODIUM SERPL-SCNC: 147 MMOL/L
VANCOMYCIN TROUGH SERPL-MCNC: 15.1 UG/ML
WBC # BLD AUTO: 8.13 K/UL

## 2017-12-01 PROCEDURE — 99233 SBSQ HOSP IP/OBS HIGH 50: CPT | Mod: ,,, | Performed by: INTERNAL MEDICINE

## 2017-12-01 PROCEDURE — 97530 THERAPEUTIC ACTIVITIES: CPT

## 2017-12-01 PROCEDURE — G8978 MOBILITY CURRENT STATUS: HCPCS | Mod: CK

## 2017-12-01 PROCEDURE — 25000003 PHARM REV CODE 250: Performed by: INTERNAL MEDICINE

## 2017-12-01 PROCEDURE — 92610 EVALUATE SWALLOWING FUNCTION: CPT

## 2017-12-01 PROCEDURE — 25000242 PHARM REV CODE 250 ALT 637 W/ HCPCS: Performed by: INTERNAL MEDICINE

## 2017-12-01 PROCEDURE — 20000000 HC ICU ROOM

## 2017-12-01 PROCEDURE — G8979 MOBILITY GOAL STATUS: HCPCS | Mod: CI

## 2017-12-01 PROCEDURE — 36415 COLL VENOUS BLD VENIPUNCTURE: CPT

## 2017-12-01 PROCEDURE — 94640 AIRWAY INHALATION TREATMENT: CPT

## 2017-12-01 PROCEDURE — 80202 ASSAY OF VANCOMYCIN: CPT

## 2017-12-01 PROCEDURE — 99900035 HC TECH TIME PER 15 MIN (STAT)

## 2017-12-01 PROCEDURE — 83735 ASSAY OF MAGNESIUM: CPT

## 2017-12-01 PROCEDURE — 25000003 PHARM REV CODE 250: Performed by: EMERGENCY MEDICINE

## 2017-12-01 PROCEDURE — 63600175 PHARM REV CODE 636 W HCPCS: Performed by: INTERNAL MEDICINE

## 2017-12-01 PROCEDURE — G8996 SWALLOW CURRENT STATUS: HCPCS | Mod: CI

## 2017-12-01 PROCEDURE — 25000003 PHARM REV CODE 250: Performed by: HOSPITALIST

## 2017-12-01 PROCEDURE — G8998 SWALLOW D/C STATUS: HCPCS | Mod: CI

## 2017-12-01 PROCEDURE — 94660 CPAP INITIATION&MGMT: CPT

## 2017-12-01 PROCEDURE — G8997 SWALLOW GOAL STATUS: HCPCS | Mod: CI

## 2017-12-01 PROCEDURE — 85025 COMPLETE CBC W/AUTO DIFF WBC: CPT

## 2017-12-01 PROCEDURE — 63600175 PHARM REV CODE 636 W HCPCS: Performed by: EMERGENCY MEDICINE

## 2017-12-01 PROCEDURE — 80053 COMPREHEN METABOLIC PANEL: CPT

## 2017-12-01 PROCEDURE — 97162 PT EVAL MOD COMPLEX 30 MIN: CPT

## 2017-12-01 RX ORDER — IPRATROPIUM BROMIDE AND ALBUTEROL SULFATE 2.5; .5 MG/3ML; MG/3ML
3 SOLUTION RESPIRATORY (INHALATION)
Status: DISCONTINUED | OUTPATIENT
Start: 2017-12-01 | End: 2017-12-02

## 2017-12-01 RX ORDER — IPRATROPIUM BROMIDE AND ALBUTEROL SULFATE 2.5; .5 MG/3ML; MG/3ML
3 SOLUTION RESPIRATORY (INHALATION) EVERY 6 HOURS PRN
Status: DISCONTINUED | OUTPATIENT
Start: 2017-12-01 | End: 2017-12-01

## 2017-12-01 RX ORDER — ACETAMINOPHEN 325 MG/1
650 TABLET ORAL EVERY 6 HOURS PRN
Status: DISCONTINUED | OUTPATIENT
Start: 2017-12-01 | End: 2017-12-05 | Stop reason: HOSPADM

## 2017-12-01 RX ORDER — PREDNISONE 20 MG/1
40 TABLET ORAL DAILY
Status: DISCONTINUED | OUTPATIENT
Start: 2017-12-02 | End: 2017-12-05 | Stop reason: HOSPADM

## 2017-12-01 RX ADMIN — METHYLPREDNISOLONE SODIUM SUCCINATE 80 MG: 40 INJECTION, POWDER, FOR SOLUTION INTRAMUSCULAR; INTRAVENOUS at 05:12

## 2017-12-01 RX ADMIN — IPRATROPIUM BROMIDE AND ALBUTEROL SULFATE 3 ML: .5; 3 SOLUTION RESPIRATORY (INHALATION) at 07:12

## 2017-12-01 RX ADMIN — VANCOMYCIN HYDROCHLORIDE 1250 MG: 1 INJECTION, POWDER, LYOPHILIZED, FOR SOLUTION INTRAVENOUS at 01:12

## 2017-12-01 RX ADMIN — ENOXAPARIN SODIUM 40 MG: 100 INJECTION SUBCUTANEOUS at 06:12

## 2017-12-01 RX ADMIN — METOPROLOL TARTRATE 25 MG: 25 TABLET ORAL at 09:12

## 2017-12-01 RX ADMIN — ROSUVASTATIN CALCIUM 20 MG: 10 TABLET, FILM COATED ORAL at 09:12

## 2017-12-01 RX ADMIN — CIPROFLOXACIN HYDROCHLORIDE 1 DROP: 3 SOLUTION/ DROPS OPHTHALMIC at 09:12

## 2017-12-01 RX ADMIN — CIPROFLOXACIN HYDROCHLORIDE 1 DROP: 3 SOLUTION/ DROPS OPHTHALMIC at 06:12

## 2017-12-01 RX ADMIN — ASPIRIN 81 MG CHEWABLE TABLET 81 MG: 81 TABLET CHEWABLE at 09:12

## 2017-12-01 RX ADMIN — PIPERACILLIN AND TAZOBACTAM 4.5 G: 4; .5 INJECTION, POWDER, LYOPHILIZED, FOR SOLUTION INTRAVENOUS; PARENTERAL at 01:12

## 2017-12-01 RX ADMIN — PIPERACILLIN AND TAZOBACTAM 4.5 G: 4; .5 INJECTION, POWDER, LYOPHILIZED, FOR SOLUTION INTRAVENOUS; PARENTERAL at 05:12

## 2017-12-01 RX ADMIN — CIPROFLOXACIN HYDROCHLORIDE 1 DROP: 3 SOLUTION/ DROPS OPHTHALMIC at 10:12

## 2017-12-01 RX ADMIN — ACETAMINOPHEN 325 MG: 325 TABLET ORAL at 11:12

## 2017-12-01 RX ADMIN — METHYLPREDNISOLONE SODIUM SUCCINATE 80 MG: 40 INJECTION, POWDER, FOR SOLUTION INTRAMUSCULAR; INTRAVENOUS at 09:12

## 2017-12-01 RX ADMIN — CIPROFLOXACIN HYDROCHLORIDE 1 DROP: 3 SOLUTION/ DROPS OPHTHALMIC at 01:12

## 2017-12-01 RX ADMIN — CIPROFLOXACIN HYDROCHLORIDE 1 DROP: 3 SOLUTION/ DROPS OPHTHALMIC at 05:12

## 2017-12-01 RX ADMIN — IPRATROPIUM BROMIDE AND ALBUTEROL SULFATE 3 ML: .5; 3 SOLUTION RESPIRATORY (INHALATION) at 04:12

## 2017-12-01 RX ADMIN — PIPERACILLIN AND TAZOBACTAM 4.5 G: 4; .5 INJECTION, POWDER, LYOPHILIZED, FOR SOLUTION INTRAVENOUS; PARENTERAL at 09:12

## 2017-12-01 RX ADMIN — IPRATROPIUM BROMIDE AND ALBUTEROL SULFATE 3 ML: .5; 3 SOLUTION RESPIRATORY (INHALATION) at 11:12

## 2017-12-01 NOTE — ASSESSMENT & PLAN NOTE
Sister reported she wanted to be DNR and son in agreement with expressed wishes. Family updated on status and DNR orders written. This was later changed to partial code which includes no chest compressions, no shock or drugs.

## 2017-12-01 NOTE — PLAN OF CARE
Problem: Patient Care Overview  Goal: Plan of Care Review  Outcome: Ongoing (interventions implemented as appropriate)  Pt remains in ICU. Extubated today and now on Bipap with intermittent nasal cannula. Tolerating Bipap well. NS @ 50 ml/hr. Alert and oriented and moving all extremities well. Swallowing without difficulty. Multiple loose BM's today. PRN hydralazine given for SBP >170.  Remains free from fall, injury, or breakdown throughout shift.

## 2017-12-01 NOTE — ASSESSMENT & PLAN NOTE
Pt chronically on O2 at 2L at baseline for COPD and with SUNITHA, +TOB at baseline with recent new diagnosis of lung mass reported about 1 month ago done at [place]. Pt has h/o breast CA, therefore, unclear of new primary or reoccurrence of breast CA. She was scheduled for biopsy as outpatient. Pt with very large lung mass with probable post obstructive pneumonia and/or running out of supplemental O2, exacerbating COPD. This was severe enough to require vent support. Successfully extubated on 11/30. Continue of Zosyn/Vancomycin then de-escalate to aumentin or clinda tomorro. Taper steroids. Continue supplemental O2 via low flow NC. Duo-nebs PRN. Appreciate further pulm recs

## 2017-12-01 NOTE — PLAN OF CARE
Problem: Patient Care Overview  Goal: Plan of Care Review  Outcome: Ongoing (interventions implemented as appropriate)  Pt remains in ICU. Pt awake, alert, and oriented to person, place, time and situation. Vital signs stable. PRN hydralazine given once. Pt on BiPAP throughout the night, tolerating well. No complaints of SOB. Urine output adequate via santos catheter. No bowel movement this shift. IVF infusing. Pt and family updated on plan of care. Pt free of hospital acquired injuries and falls.

## 2017-12-01 NOTE — SUBJECTIVE & OBJECTIVE
Interval History/Significant Events:   Pt feels subjectively improved today- less dyspnea. Still coughing but it is non-productive & chronic. Wants to try clear liquids today.    Review of Systems   Review of Systems:  CV: no syncope  ENT: no sore throat  Resp: per hpi  Eyes: no visual changes  Gastrointestinal: no nausea or vomiting  Integument/Breast: no rash  Musculoskeletal: no arthralgias  Neurological: no headaches  Behavioral/Psych: no confusion or depression  Heme: no bleeding    Objective:     Vital Signs (Most Recent):  Temp: 98.2 °F (36.8 °C) (12/01/17 0315)  Pulse: (!) 54 (12/01/17 0739)  Resp: 18 (12/01/17 0739)  BP: 138/71 (12/01/17 0700)  SpO2: 96 % (12/01/17 0739) Vital Signs (24h Range):  Temp:  [97.9 °F (36.6 °C)-98.3 °F (36.8 °C)] 98.2 °F (36.8 °C)  Pulse:  [30-89] 54  Resp:  [16-50] 18  SpO2:  [83 %-98 %] 96 %  BP: (111-189)/() 138/71   Weight: 80.7 kg (178 lb)  Body mass index is 32.56 kg/m².      Intake/Output Summary (Last 24 hours) at 12/01/17 0815  Last data filed at 12/01/17 0600   Gross per 24 hour   Intake             1750 ml   Output             2280 ml   Net             -530 ml       Physical Exam  General: no distress  Eyes:  conjunctivae/corneas clear  Nose: no discharge   Neck: no jugular venous distention  Lungs:  improved air movement, no crackles, no accessory muscle use  Heart: regular rate and rhythm and no murmur  Abdomen: non-distended, soft, non-tender  Extremities: no cyanosis or edema, or clubbing  Skin: No rashes or lesions. good skin turgor  Neurologic: awake, alert, conversant, motor intact    Vents:  Vent Mode: NIV+ PC (11/30/17 1134)  Set Rate: 12 bmp (12/01/17 0400)  Vt Set: 400 mL (11/30/17 0840)  Pressure Support: 5 cmH20 (11/30/17 1000)  PEEP/CPAP: 8 cmH20 (12/01/17 0400)  Oxygen Concentration (%): 40 (12/01/17 0739)  Peak Airway Pressure: 14.2 cmH2O (12/01/17 0400)  Total Ve: 7.6 mL (12/01/17 0400)  F/VT Ratio<105 (RSBI): 580.65 (12/01/17  0400)  Lines/Drains/Airways     Drain                 Urethral Catheter 11/29/17 0330 Double-lumen 16 Fr. 2 days          Peripheral Intravenous Line                 Peripheral IV - Single Lumen 11/29/17 0332 Left Antecubital 2 days         Peripheral IV - Single Lumen 11/29/17 0334 Right Wrist 2 days              Significant Labs:    CBC/Anemia Profile:    Recent Labs  Lab 11/30/17  0201 12/01/17  0202   WBC 6.99 8.13   HGB 13.0 13.6   HCT 40.2 41.5    203   MCV 92 92   RDW 13.7 13.8        Chemistries:    Recent Labs  Lab 11/30/17  0201 12/01/17  0202    147*   K 3.7 3.6    106   CO2 30* 31*   BUN 10 23   CREATININE 0.7 1.0   CALCIUM 8.9 9.2   ALBUMIN 3.2* 3.2*   PROT 6.2 6.6   BILITOT 0.6 0.5   ALKPHOS 40* 48*   ALT 19 45*   AST 22 42*   MG 2.5 2.9*        CT reviewed- see HPI for my interpretation     Echo reviewed     CXR from today- no interval change

## 2017-12-01 NOTE — PLAN OF CARE
Problem: Patient Care Overview  Goal: Plan of Care Review  Outcome: Ongoing (interventions implemented as appropriate)  VSS. Weaned to 3L nasal cannula. BiPAP now PRN. Nebs scheduled. Lovera out, voiding per bedpan or commode. Still has diarrhea. Started on soft diet. Tolerating well. Out of bed to chair with PT. Shortness of breath with activity. Vanc and zosyn continued. Steroids tapered.

## 2017-12-01 NOTE — PROGRESS NOTES
[facility]  Progress Note    Patient Name: Ade Castellano  MRN: [id number]  Patient Class: IP- Inpatient   Admission Date: 11/29/2017  Length of Stay: 2 days  Attending Physician: Missy Sigala MD  Primary Care Provider: Jeannine Huitron MD        Subjective:     Principal Problem:Acute on chronic respiratory failure with hypoxia and hypercapnia    HPI:  73 y/o female with COPD on O2 @2L, +TOB, recent diagnosis of lung mass seen on CT done at [place] (Dr. Katz, Pulmonary), SUNITHA not tolerant of CPAP, CAD s/p MI with stent, HTN, HLP, and h/o pneumonia and breast CA who presented in severe respiratory distress. Pt was reported to be hypoxic at 80% on 2.5 L home O2 by NC upon EMS arrival.  Pt was in [facility]) from Monday 11/20 to yesterday for the Thanksgiving holiday. Sister reported the patient had progressive SOB and then she ran out of her oxygen. She went to a clinic in [place] prior to her arrival in this state and was given a steroid shot and a prescription for levofloxacin. Sister reports patient had difficulty swallowing for some time, and stated that she never reported having fever.  In the ER, her workup was remarkable for a CTA which showed a large right hilar mass with involvement of adjacent segmental pulmonary arterial branches and bronchi with associated postobstructive atelectasis and volume loss in the right middle lobe with adjacent ground glass opacity along with  mediastinal and axillary adenopathy, with a right axillary lymph node measuring up to 2.0 cm in short axis diameter. Due to her respiratory distress, she was intubated and placed on the vent.    Hospital Course:  Ms. Castellano was admitted for acute on chronic respiratory failure requiring intubation and ventilation. Pt with large lung mass with probable post-obstructive pneumonia and COPD exacerbation which resulted in acute failure. Pt was treated with vent support,  antibiotics of Zosyn/Cipro/Vancomycin, IV steroids as per Pulmonary.    Interval History: clinically improved. Stable on low flow nasal cannula with adequate SaO2. Tolerating PO meds. Passed swallow eval.     Review of Systems   Constitutional: Negative.    Respiratory: Negative.    Cardiovascular: Negative.    Gastrointestinal: Negative.    Genitourinary: Negative.    Musculoskeletal: Negative.    Neurological: Negative.    Hematological: Negative.    Psychiatric/Behavioral: Negative.      Objective:     Vital Signs (Most Recent):  Temp: 98.5 °F (36.9 °C) (12/01/17 0800)  Pulse: 75 (12/01/17 1300)  Resp: (!) 22 (12/01/17 1200)  BP: (!) 146/69 (12/01/17 1200)  SpO2: 95 % (12/01/17 1300) Vital Signs (24h Range):  Temp:  [97.9 °F (36.6 °C)-98.5 °F (36.9 °C)] 98.5 °F (36.9 °C)  Pulse:  [52-89] 75  Resp:  [16-52] 22  SpO2:  [91 %-98 %] 95 %  BP: (111-177)/(57-83) 146/69     Weight: 80.7 kg (178 lb)  Body mass index is 32.56 kg/m².    Intake/Output Summary (Last 24 hours) at 12/01/17 1340  Last data filed at 12/01/17 1200   Gross per 24 hour   Intake             2070 ml   Output             2145 ml   Net              -75 ml      Physical Exam   Constitutional: She is oriented to person, place, and time. She appears well-developed. No distress.   Chronically ill appearing.   HENT:   Head: Normocephalic and atraumatic.   Mouth/Throat: Oropharynx is clear and moist.   Eyes: Conjunctivae are normal.   Neck: Normal range of motion. Neck supple.   Cardiovascular: Normal rate, regular rhythm, normal heart sounds and intact distal pulses.    Pulmonary/Chest: Effort normal and breath sounds normal. She has no wheezes. She has no rales.   On low flow NC.    Abdominal: Soft. Bowel sounds are normal.   Musculoskeletal: Normal range of motion.   Neurological: She is alert and oriented to person, place, and time.   Sedated on vent, spontaneous movement of extremities   Skin: Skin is warm and dry. She is not diaphoretic.   Psychiatric:  She has a normal mood and affect. Her behavior is normal. Judgment and thought content normal.   Nursing note and vitals reviewed.      Significant Labs: All pertinent labs within the past 24 hours have been reviewed.    Significant Imaging: I have reviewed all pertinent imaging results/findings within the past 24 hours.  I have reviewed and interpreted all pertinent imaging results/findings within the past 24 hours.    Assessment/Plan:      * Acute on chronic respiratory failure with hypoxia and hypercapnia    Pt chronically on O2 at 2L at baseline for COPD and with SUNITHA, +TOB at baseline with recent new diagnosis of lung mass reported about 1 month ago done at [place]. Pt has h/o breast CA, therefore, unclear of new primary or reoccurrence of breast CA. She was scheduled for biopsy as outpatient. Pt with very large lung mass with probable post obstructive pneumonia and/or running out of supplemental O2, exacerbating COPD. This was severe enough to require vent support. Successfully extubated on 11/30. Continue of Zosyn/Vancomycin then de-escalate to aumentin or clinda tomorro. Taper steroids. Continue supplemental O2 via low flow NC. Duo-nebs PRN. Appreciate further pulm recs          Cavitary pneumonia    As discussed above, on antibiotics as per Pulmonary.          DNR (do not resuscitate)    Sister reported she wanted to be DNR and son in agreement with expressed wishes. Family updated on status and DNR orders written. This was later changed to partial code which includes no chest compressions, no shock or drugs.         Lung mass    As discussed above. Biopsy deferred to outpatient workup as discussed with Pulmonary.        Tobacco abuse    Smoking cessation counseling when appropriate.        Esophageal dysphagia    Dental soft with thins. Stay up right at least 30 minutes after each meal. Appreciate further recs from ST        Hyperlipidemia LDL goal <70    Continue statin.        COPD exacerbation     As discussed above.        SUNITHA (obstructive sleep apnea)    Reported to be intolerant of CPAP.        History of breast cancer    As discussed above.        CAD (coronary artery disease)    Recent reported cardiac workup done at [place] by Dr. Ortiz. Will obtain studies. No reported CP per family. Will continue ASA, coreg and statin.        Benign hypertensive heart disease without heart failure    Continue metoprolol, ASA and statin.          VTE Risk Mitigation         Ordered     enoxaparin injection 40 mg  Daily     Route:  Subcutaneous        11/29/17 0558     Medium Risk of VTE  Once      11/29/17 0558     Place sequential compression device  Until discontinued      11/29/17 0558     Place ZAIDA hose  Until discontinued      11/29/17 0558        Plan discussed with patient and patient's sister at bedside. PT/OT and diet. Observe overnight. Step down tomorrow if stable    Critical care time spent on the evaluation and treatment of severe organ dysfunction, review of pertinent labs and imaging studies, discussions with consulting providers and discussions with patient/family: > 45 minutes.    Missy Miranda MD  Department of Hospital Medicine   [facility]

## 2017-12-01 NOTE — PT/OT/SLP EVAL
Speech Language Pathology  Bedside Swallow Evaluation    Ade Castellano   MRN: [id number]   Admitting Diagnosis: Acute on chronic respiratory failure with hypoxia and hypercapnia    Diet recommendations: Solid Diet Level: Dental Soft (when cleared by GI)  Liquid Diet Level: Thin Remain upright 30 minutes post meal and Meds whole 1 at a time    SLP Treatment Date: 12/01/17  Speech Start Time: 1210     Speech Stop Time: 1220     Speech Total (min): 10 min       TREATMENT BILLABLE MINUTES:  Eval Swallow and Oral Function 10    Diagnosis: Acute on chronic respiratory failure with hypoxia and hypercapnia   Extubated 11/30 1115    Past Medical History:   Diagnosis Date    Acute respiratory failure with hypoxia and hypercapnia 11/29/2017    Angina pectoris     Bell's palsy     left facial weakness    Breast cancer     RIGHT    CAD (coronary artery disease)     Cervical cancer     Chronic bronchitis     COPD (chronic obstructive pulmonary disease)     Dr. Sterling brown present     Emphysema of lung     H/O colonoscopy 06/2017    due for repeat colonsocopy in 6/2018    History of heart artery stent     Dr. Ortiz  x2 stents    Hyperlipidemia     Hypertension     Myocardial infarction     SUNITHA (obstructive sleep apnea)     intolerant to mask    Pneumonia     PUD (peptic ulcer disease)     Sleep apnea      Past Surgical History:   Procedure Laterality Date    ADENOIDECTOMY      BREAST BIOPSY Right     BREAST SURGERY      lumpectomy right side     CERVIX SURGERY      COLONOSCOPY N/A 3/17/2017    Procedure: COLONOSCOPY;  Surgeon: Julio Rudd MD;  Location: [facility] ENDO;  Service: Endoscopy;  Laterality: N/A;    COLONOSCOPY N/A 6/30/2017    Procedure: COLONOSCOPY;  Surgeon: Julio Rudd MD;  Location: [facility] ENDO;  Service: Endoscopy;  Laterality: N/A;    sweat glands axillary regions      TONSILLECTOMY         Has the patient been evaluated by SLP for swallowing? : Yes (MBSS 1 year ago negative for  aspiration)  Keep patient NPO?: No   General Precautions: Standard, fall    Current Respiratory Status: nasal cannula     Prior diet: unrestricted    Subjective:  Pt able to recall results and recs from MBSS performed skip. 1 year ago.   Patient goals: d/c admit    Pain/Comfort  Pain Rating 1: 0/10    Objective: Pt upright in chair for oral mech eval (see below) and self presented trials of thin liquids (4 oz via straw across multiple trials.) Puree X1 and solids X1. Oral prep/A-P transport, swallow timing were wfl across trials/consistencie. Increased oral prep of solid appeared secondary to poor dentition, no overt s/s of aspiration for today's study. She reports sensation of slow esophageal emptying for puree and solid. Please note silent aspiration cannot be r/o at bedside.        Oral Musculature Evaluation  Oral Musculature: left weakness (PMhx of Bell's Palsy)  Dentition: scattered dentition, upper dentures  Mandibular Strength and Mobility: WFL  Oral Labial Strength and Mobility: WFL  Lingual Strength and Mobility: WFL  Voice Prior to PO Intake: wfl  Oral Musculature Comments: grossly functional despite trace-mild left droop         FIM:  Social Interaction: 7 Complete [place]--The patient interacts appropriately with staff, other patients, and family members (e.g., controls temper, accepts criticism, is aware that words and actions have an impact on others), and does not require medication for                 Assessment:  Aed Castellano is a 72 y.o. female with a medical diagnosis of Acute on chronic respiratory failure with hypoxia and hypercapnia and presents with likely esophageal dysphagia, no overt s/s of significant oropharyngeal dysphagia.         Goals:    SLP Goals     Not on file          Multidisciplinary Problems (Resolved)        Problem: SLP Goal    Goal Priority Disciplines Outcome   SLP Goal   (Resolved)    Low SLP Outcome(s) achieved   Description:  Pt will participate in bedside  swallow study (goal met 12/1)                     Plan:   NO further ST is warranted at this time.   Plan of Care expires: 12/01/17  Plan of Care reviewed with: patient  SLP Follow-up?: No         SLP G-Codes  Functional Assessment Tool Used: fims  Score: 6  Functional Limitations: Swallowing  Swallow Current Status (): CI  Swallow Goal Status (): CI  Swallow Discharge Status (): ANTONI Neal CCC-SLP  12/01/2017

## 2017-12-01 NOTE — ASSESSMENT & PLAN NOTE
I think her acute decompensation is likely due to COPD & pneumonia more than the lung cancer. She is responding to treatment & off the ventilator. She was extubated to bipap due to significant COPD. Will start to wean bipap as tolerated.

## 2017-12-01 NOTE — PROGRESS NOTES
[facility]  Critical Care Medicine  Progress Note    Patient Name: Ade Castellano  MRN: [id number]  Admission Date: 11/29/2017  Hospital Length of Stay: 2 days  Code Status: Partial Code  Attending Provider: Missy Sigala MD  Primary Care Provider: Jeannine Huitron MD   Principal Problem: Acute on chronic respiratory failure with hypoxia and hypercapnia    Subjective:     HPI:  The patient is a 72 yof with past medical history of COPD, stage 1 breast cancer in 2014 treated with lumpectomy, & a current smoker. She presented yesterday with hypoxemic & hypercapnic respiratory failure & was intubated in the ED. History is obtained from her family.    The patient was recently seen for reported symptoms of regurgitation of stomach contents when lying down & some concern that she was aspirating with these episodes. The patient underwent an EGD which was unrevealing. Tentative plan was to follow up with GI for a manometry study to evaluate for achalasia. During the course of this evaluation she had a CXR which showed a R hilar mass. This was followed up with a CT chest which revealed R hilar mass, areas of cavitation distal to this, volume loss in the RML, & axillary & mediastinal adenopathy. Our CT yesterday showed essentially the same thing. Tentative plans had been made for a biopsy as an outpatient but her current illness has delayed these plans.    She has a history of hospitalization for COPD exacerbation but has never required mechanical ventilation previously. She uses supplemental oxygen at baseline. She uses spiriva & prn albuterol. Her pulmonologist is Dr Katz at [facility]. She suffers a chronic cough that may have been worse in the past week, but family was unclear on this. She suffers chronic dyspnea with exertion which has been significantly worse over the past week. Family denies fevers, hemoptysis, or mucus production.    The family denies any history of TB or known  exposures.    Hospital/ICU Course:  Extubated to bipap yesterday. Making progress. Off the bipap this morning & maintaining well.    Interval History/Significant Events:   Pt feels subjectively improved today- less dyspnea. Still coughing but it is non-productive & chronic. Wants to try clear liquids today.    Review of Systems   Review of Systems:  CV: no syncope  ENT: no sore throat  Resp: per hpi  Eyes: no visual changes  Gastrointestinal: no nausea or vomiting  Integument/Breast: no rash  Musculoskeletal: no arthralgias  Neurological: no headaches  Behavioral/Psych: no confusion or depression  Heme: no bleeding    Objective:     Vital Signs (Most Recent):  Temp: 98.2 °F (36.8 °C) (12/01/17 0315)  Pulse: (!) 54 (12/01/17 0739)  Resp: 18 (12/01/17 0739)  BP: 138/71 (12/01/17 0700)  SpO2: 96 % (12/01/17 0739) Vital Signs (24h Range):  Temp:  [97.9 °F (36.6 °C)-98.3 °F (36.8 °C)] 98.2 °F (36.8 °C)  Pulse:  [30-89] 54  Resp:  [16-50] 18  SpO2:  [83 %-98 %] 96 %  BP: (111-189)/() 138/71   Weight: 80.7 kg (178 lb)  Body mass index is 32.56 kg/m².      Intake/Output Summary (Last 24 hours) at 12/01/17 0815  Last data filed at 12/01/17 0600   Gross per 24 hour   Intake             1750 ml   Output             2280 ml   Net             -530 ml       Physical Exam  General: no distress  Eyes:  conjunctivae/corneas clear  Nose: no discharge   Neck: no jugular venous distention  Lungs:  improved air movement, no crackles, no accessory muscle use  Heart: regular rate and rhythm and no murmur  Abdomen: non-distended, soft, non-tender  Extremities: no cyanosis or edema, or clubbing  Skin: No rashes or lesions. good skin turgor  Neurologic: awake, alert, conversant, motor intact    Vents:  Vent Mode: NIV+ PC (11/30/17 1134)  Set Rate: 12 bmp (12/01/17 0400)  Vt Set: 400 mL (11/30/17 0840)  Pressure Support: 5 cmH20 (11/30/17 1000)  PEEP/CPAP: 8 cmH20 (12/01/17 0400)  Oxygen Concentration (%): 40 (12/01/17 0739)  Peak  Airway Pressure: 14.2 cmH2O (12/01/17 0400)  Total Ve: 7.6 mL (12/01/17 0400)  F/VT Ratio<105 (RSBI): 580.65 (12/01/17 0400)  Lines/Drains/Airways     Drain                 Urethral Catheter 11/29/17 0330 Double-lumen 16 Fr. 2 days          Peripheral Intravenous Line                 Peripheral IV - Single Lumen 11/29/17 0332 Left Antecubital 2 days         Peripheral IV - Single Lumen 11/29/17 0334 Right Wrist 2 days              Significant Labs:    CBC/Anemia Profile:    Recent Labs  Lab 11/30/17 0201 12/01/17 0202   WBC 6.99 8.13   HGB 13.0 13.6   HCT 40.2 41.5    203   MCV 92 92   RDW 13.7 13.8        Chemistries:    Recent Labs  Lab 11/30/17 0201 12/01/17 0202    147*   K 3.7 3.6    106   CO2 30* 31*   BUN 10 23   CREATININE 0.7 1.0   CALCIUM 8.9 9.2   ALBUMIN 3.2* 3.2*   PROT 6.2 6.6   BILITOT 0.6 0.5   ALKPHOS 40* 48*   ALT 19 45*   AST 22 42*   MG 2.5 2.9*        CT reviewed- see HPI for my interpretation     Echo reviewed     CXR from today- no interval change    Assessment/Plan:     Pulmonary   * Acute on chronic respiratory failure with hypoxia and hypercapnia    I think her acute decompensation is likely due to COPD & pneumonia more than the lung cancer. She is responding to treatment & off the ventilator. She was extubated to bipap due to significant COPD. Will start to wean bipap as tolerated.         Cavitary pneumonia    Treat with vanc & zosyn. Follow up sputum cultures- showing GPCs, awaiting final results. If nothing grows, then I would recommend four weeks of abx with anaerobic coverage (e.g. clinda or augmentin). It's quite possible that the cavitary lesions are cancerous rather than pneumonia, but I would like to give her a full course of treatment for cavitary pneumonia & repeat imaging to see the response.        Lung mass    Defer biopsy until acute issues are resolved.        COPD exacerbation    Continue to treat with duonebs & steroids.        Other   Tobacco abuse     Needs to stop smoking.          PPX: jitendra Sanchez MD  Critical Care Medicine  [facility]

## 2017-12-01 NOTE — PLAN OF CARE
Problem: Physical Therapy Goal  Goal: Physical Therapy Goal  Goals to be met by: 2017     Patient will increase functional independence with mobility by performin. Supine to sit with Modified [place]  2. Sit to stand transfer with Modified [place]  3. Gait  x 250 feet with Modified [place] with or without AD     Outcome: Ongoing (interventions implemented as appropriate)  Initial PT evaluation performed.  Pt could benefit from daily skilled PT services in order to maximize function prior to D/C.  Ongoing assessment pending pt progress for DME and disposition.

## 2017-12-01 NOTE — PLAN OF CARE
12/01/17 1132   Discharge Reassessment   Assessment Type Discharge Planning Assessment   Provided patient/caregiver education on the expected discharge date and the discharge plan Yes   Do you have any problems affording any of your prescribed medications? No   Discharge Plan A Home with family;Home Health   Discharge Plan B Home with family;Home Health   Patient choice form signed by patient/caregiver No   Can the patient answer the patient profile reliably? Yes, cognitively intact   How often would a person be available to care for the patient? Whenever needed   Number of comorbid conditions (as recorded on the chart) Five or more   During the past month, has the patient often been bothered by feeling down, depressed or hopeless? No   During the past month, has the patient often been bothered by little interest or pleasure in doing things? No     Pt met with pt to discuss discharge planning. Pt informed SW she would like to discharge home  and SNF is not an option. Per pt, her sister Annamarie Yun ([phone]) will stay with her for a few weeks and help manage her care at home. A consult has been placed for speech and PT/OT. Awaiting recommendations.

## 2017-12-01 NOTE — ASSESSMENT & PLAN NOTE
Treat with vanc & zosyn. Follow up sputum cultures- showing GPCs, awaiting final results. If nothing grows, then I would recommend four weeks of abx with anaerobic coverage (e.g. clinda or augmentin). It's quite possible that the cavitary lesions are cancerous rather than pneumonia, but I would like to give her a full course of treatment for cavitary pneumonia & repeat imaging to see the response.

## 2017-12-01 NOTE — ASSESSMENT & PLAN NOTE
Dental soft with thins. Stay up right at least 30 minutes after each meal. Appreciate further recs from ST

## 2017-12-01 NOTE — PT/OT/SLP EVAL
Physical Therapy  Evaluation    Ade Castellano   MRN: [id number]   Admitting Diagnosis: Acute on chronic respiratory failure with hypoxia and hypercapnia    PT Received On: 12/01/17  PT Start Time: 1055     PT Stop Time: 1118    PT Total Time (min): 23 min       Billable Minutes:  Evaluation 15 and Therapeutic Activity 8    Diagnosis: Acute on chronic respiratory failure with hypoxia and hypercapnia      Past Medical History:   Diagnosis Date    Acute respiratory failure with hypoxia and hypercapnia 11/29/2017    Angina pectoris     Bell's palsy     left facial weakness    Breast cancer     RIGHT    CAD (coronary artery disease)     Cervical cancer     Chronic bronchitis     COPD (chronic obstructive pulmonary disease)     Dr. Katz    Dental bridge present     Emphysema of lung     H/O colonoscopy 06/2017    due for repeat colonsocopy in 6/2018    History of heart artery stent     Dr. Ortiz  x2 stents    Hyperlipidemia     Hypertension     Myocardial infarction     SUNITHA (obstructive sleep apnea)     intolerant to mask    Pneumonia     PUD (peptic ulcer disease)     Sleep apnea       Past Surgical History:   Procedure Laterality Date    ADENOIDECTOMY      BREAST BIOPSY Right     BREAST SURGERY      lumpectomy right side     CERVIX SURGERY      COLONOSCOPY N/A 3/17/2017    Procedure: COLONOSCOPY;  Surgeon: Julio Rudd MD;  Location: [facility] ENDO;  Service: Endoscopy;  Laterality: N/A;    COLONOSCOPY N/A 6/30/2017    Procedure: COLONOSCOPY;  Surgeon: Julio Rudd MD;  Location: [facility] ENDO;  Service: Endoscopy;  Laterality: N/A;    sweat glands axillary regions      TONSILLECTOMY         General Precautions: Standard, fall  Orthopedic Precautions: N/A   Braces: N/A       Do you have any cultural, spiritual, Catholic conflicts, given your current situation?: none    Patient History:  Lives With: alone  Living Arrangements: mobile home  Home Accessibility: stairs to enter home  Number of Stairs to  Enter Home: 3  Stair Railings at Home: outside, present on left side  Transportation Available: car, family or friend will provide  Equipment Currently Used at Home: oxygen, nebulizer  DME owned (not currently used): none    Previous Level of Function:  Ambulation Skills: independent  Transfer Skills: independent  ADL Skills: independent  Work/Leisure Activity: independent    Subjective:  Communicated with nsg prior to session.  Pt agreeable to eval  Chief Complaint: HA  Patient goals: PLOF    Pain/Comfort  Pain Rating 1: 5/10  Location 1: head  Pain Addressed 1: Reposition, Distraction, Cessation of Activity, Nurse notified (tylenol administered by nurse during eval.)  Pain Rating Post-Intervention 1: 5/10      Objective:   Patient found with: telemetry, blood pressure cuff, oxygen, peripheral IV, pulse ox (continuous), SCD     Cognitive Exam:  Oriented to: Person, Place, Time and Situation    Follows Commands/attention: Follows one-step commands  Communication: clear/fluent  Safety awareness/insight to disability: impaired    Physical Exam:  Postural examination/scapula alignment: Rounded shoulder and Head forward    Skin integrity: Visible skin intact  Edema: R UE     Sensation: Intact  light/touch B Le's    Upper Extremity Range of Motion:  Right Upper Extremity: WFL  Left Upper Extremity: WFL    Upper Extremity Strength:  Right Upper Extremity: WFL  Left Upper Extremity: WFL    Lower Extremity Range of Motion:  Right Lower Extremity: WFL  Left Lower Extremity: WFL    Lower Extremity Strength:  Right Lower Extremity: WFL  Left Lower Extremity: WFL     Fine motor coordination:  N/T    Gross motor coordination: impaired 2/2 weakness    Functional Mobility:  Bed Mobility:  Scooting/Bridging: Contact Guard Assistance  Supine to Sit: Moderate Assistance    Transfers:  Sit <> Stand Assistance: Minimum Assistance  Sit <> Stand Assistive Device: No Assistive Device  Bed <> Chair Technique:  (ambulatory)  Bed <> Chair  Assistance: Minimum Assistance  Bed <> Chair Assistive Device: No Assistive Device    Gait:   Gait Distance: 3 steps to BS chair  Assistance 1: Minimum assistance  Gait Assistive Device: Hand held assist  Gait Pattern: reciprocal  Gait Deviation(s): decreased abhay, decreased step length, forward lean    Stairs:  N/T    Balance:   Static Sit: FAIR+: Able to take MINIMAL challenges from all directions  Dynamic Sit: FAIR+: Maintains balance through MINIMAL excursions of active trunk motion  Static Stand: POOR+: Needs MINIMAL assist to maintain  Dynamic stand: POOR: N/A    Therapeutic Activities and Exercises:  Dangle protocol, pt sat at EOB with SBA.  Instructed pt on breathing in through nose and out through mouth throughout session.  Pt able to return demonstration with VC.  Pt performed B FAQ's x 10 reps each while seated in chair.  Instructed pt to perform R finger flex/ext for edema reduction.  Pt verbalized/demonstrated understanding.    AM-PAC 6 CLICK MOBILITY  How much help from another person does this patient currently need?   1 = Unable, Total/Dependent Assistance  2 = A lot, Maximum/Moderate Assistance  3 = A little, Minimum/Contact Guard/Supervision  4 = None, Modified [place]/Independent    Turning over in bed (including adjusting bedclothes, sheets and blankets)?: 3  Sitting down on and standing up from a chair with arms (e.g., wheelchair, bedside commode, etc.): 3  Moving from lying on back to sitting on the side of the bed?: 2  Moving to and from a bed to a chair (including a wheelchair)?: 3  Need to walk in hospital room?: 3  Climbing 3-5 steps with a railing?: 2  Total Score: 16     AM-PAC Raw Score CMS G-Code Modifier Level of Impairment Assistance   6 % Total / Unable   7 - 9 CM 80 - 100% Maximal Assist   10 - 14 CL 60 - 80% Moderate Assist   15 - 19 CK 40 - 60% Moderate Assist   20 - 22 CJ 20 - 40% Minimal Assist   23 CI 1-20% SBA / CGA   24 CH 0% Independent/ Mod I     Patient  left up in chair with all lines intact, call button in reach and nsg present.    Assessment:   Ade Castellano is a 72 y.o. female with a medical diagnosis of Acute on chronic respiratory failure with hypoxia and hypercapnia and presents with decreased functional independence 2/2 deconditioning and gen weakness.  Pt could benefit from skilled PT services to address deficits below in  order to maximize function prior to D/C.    Rehab identified problem list/impairments: Rehab identified problem list/impairments: weakness, impaired endurance, impaired self care skills, impaired balance, gait instability, impaired functional mobilty, decreased safety awareness, pain, impaired cardiopulmonary response to activity    Rehab potential is good.    Activity tolerance: Fair    Discharge recommendations: Discharge Facility/Level Of Care Needs:  (ongoing assessment pending pt progress)     Barriers to discharge: Barriers to Discharge: Decreased caregiver support, Inaccessible home environment    Equipment recommendations: Equipment Needed After Discharge:  (Ongoing assessemnt pending pt progress)     GOALS:    Physical Therapy Goals        Problem: Physical Therapy Goal    Goal Priority Disciplines Outcome Goal Variances Interventions   Physical Therapy Goal     PT/OT, PT Ongoing (interventions implemented as appropriate)     Description:  Goals to be met by: 2017     Patient will increase functional independence with mobility by performin. Supine to sit with Modified [place]  2. Sit to stand transfer with Modified [place]  3. Gait  x 250 feet with Modified [place] with or without AD                       PLAN:    Patient to be seen daily to address the above listed problems via gait training, therapeutic activities, therapeutic exercises  Plan of Care expires: 17  Plan of Care reviewed with: patient, sibling    Functional Assessment Tool Used: AM-PAC  Score: 16  Functional Limitation:  Mobility: Walking and moving around  Mobility: Walking and Moving Around Current Status (): CK  Mobility: Walking and Moving Around Goal Status (): ANTONI Bacon, PT  12/01/2017

## 2017-12-01 NOTE — SUBJECTIVE & OBJECTIVE
Interval History: clinically improved. Stable on low flow nasal cannula with adequate SaO2. Tolerating PO meds. Passed swallow eval.     Review of Systems   Constitutional: Negative.    Respiratory: Negative.    Cardiovascular: Negative.    Gastrointestinal: Negative.    Genitourinary: Negative.    Musculoskeletal: Negative.    Neurological: Negative.    Hematological: Negative.    Psychiatric/Behavioral: Negative.      Objective:     Vital Signs (Most Recent):  Temp: 98.5 °F (36.9 °C) (12/01/17 0800)  Pulse: 75 (12/01/17 1300)  Resp: (!) 22 (12/01/17 1200)  BP: (!) 146/69 (12/01/17 1200)  SpO2: 95 % (12/01/17 1300) Vital Signs (24h Range):  Temp:  [97.9 °F (36.6 °C)-98.5 °F (36.9 °C)] 98.5 °F (36.9 °C)  Pulse:  [52-89] 75  Resp:  [16-52] 22  SpO2:  [91 %-98 %] 95 %  BP: (111-177)/(57-83) 146/69     Weight: 80.7 kg (178 lb)  Body mass index is 32.56 kg/m².    Intake/Output Summary (Last 24 hours) at 12/01/17 1340  Last data filed at 12/01/17 1200   Gross per 24 hour   Intake             2070 ml   Output             2145 ml   Net              -75 ml      Physical Exam   Constitutional: She is oriented to person, place, and time. She appears well-developed. No distress.   Chronically ill appearing.   HENT:   Head: Normocephalic and atraumatic.   Mouth/Throat: Oropharynx is clear and moist.   Eyes: Conjunctivae are normal.   Neck: Normal range of motion. Neck supple.   Cardiovascular: Normal rate, regular rhythm, normal heart sounds and intact distal pulses.    Pulmonary/Chest: Effort normal and breath sounds normal. She has no wheezes. She has no rales.   On low flow NC.    Abdominal: Soft. Bowel sounds are normal.   Musculoskeletal: Normal range of motion.   Neurological: She is alert and oriented to person, place, and time.   Sedated on vent, spontaneous movement of extremities   Skin: Skin is warm and dry. She is not diaphoretic.   Psychiatric: She has a normal mood and affect. Her behavior is normal. Judgment and  thought content normal.   Nursing note and vitals reviewed.      Significant Labs: All pertinent labs within the past 24 hours have been reviewed.    Significant Imaging: I have reviewed all pertinent imaging results/findings within the past 24 hours.  I have reviewed and interpreted all pertinent imaging results/findings within the past 24 hours.

## 2017-12-02 LAB
ALBUMIN SERPL BCP-MCNC: 2.9 G/DL
ALP SERPL-CCNC: 39 U/L
ALT SERPL W/O P-5'-P-CCNC: 41 U/L
ANION GAP SERPL CALC-SCNC: 9 MMOL/L
AST SERPL-CCNC: 26 U/L
BASOPHILS # BLD AUTO: 0 K/UL
BASOPHILS NFR BLD: 0 %
BILIRUB SERPL-MCNC: 0.4 MG/DL
BUN SERPL-MCNC: 31 MG/DL
CALCIUM SERPL-MCNC: 9 MG/DL
CHLORIDE SERPL-SCNC: 103 MMOL/L
CO2 SERPL-SCNC: 33 MMOL/L
CREAT SERPL-MCNC: 1 MG/DL
DIFFERENTIAL METHOD: ABNORMAL
EOSINOPHIL # BLD AUTO: 0 K/UL
EOSINOPHIL NFR BLD: 0 %
ERYTHROCYTE [DISTWIDTH] IN BLOOD BY AUTOMATED COUNT: 13.3 %
EST. GFR  (AFRICAN AMERICAN): >60 ML/MIN/1.73 M^2
EST. GFR  (NON AFRICAN AMERICAN): 57 ML/MIN/1.73 M^2
GLUCOSE SERPL-MCNC: 166 MG/DL
HCT VFR BLD AUTO: 40 %
HGB BLD-MCNC: 13 G/DL
LYMPHOCYTES # BLD AUTO: 0.5 K/UL
LYMPHOCYTES NFR BLD: 7.7 %
MAGNESIUM SERPL-MCNC: 2.6 MG/DL
MCH RBC QN AUTO: 30 PG
MCHC RBC AUTO-ENTMCNC: 32.5 G/DL
MCV RBC AUTO: 92 FL
MONOCYTES # BLD AUTO: 0.4 K/UL
MONOCYTES NFR BLD: 6 %
NEUTROPHILS # BLD AUTO: 6 K/UL
NEUTROPHILS NFR BLD: 86.3 %
PLATELET # BLD AUTO: 212 K/UL
PMV BLD AUTO: 9.7 FL
POTASSIUM SERPL-SCNC: 3.7 MMOL/L
PROT SERPL-MCNC: 6.1 G/DL
RBC # BLD AUTO: 4.33 M/UL
SODIUM SERPL-SCNC: 145 MMOL/L
WBC # BLD AUTO: 6.98 K/UL

## 2017-12-02 PROCEDURE — 83735 ASSAY OF MAGNESIUM: CPT

## 2017-12-02 PROCEDURE — 25000242 PHARM REV CODE 250 ALT 637 W/ HCPCS: Performed by: INTERNAL MEDICINE

## 2017-12-02 PROCEDURE — 25000003 PHARM REV CODE 250: Performed by: HOSPITALIST

## 2017-12-02 PROCEDURE — 97116 GAIT TRAINING THERAPY: CPT

## 2017-12-02 PROCEDURE — 94640 AIRWAY INHALATION TREATMENT: CPT

## 2017-12-02 PROCEDURE — 27000221 HC OXYGEN, UP TO 24 HOURS

## 2017-12-02 PROCEDURE — 36415 COLL VENOUS BLD VENIPUNCTURE: CPT

## 2017-12-02 PROCEDURE — 63600175 PHARM REV CODE 636 W HCPCS: Performed by: INTERNAL MEDICINE

## 2017-12-02 PROCEDURE — 21400001 HC TELEMETRY ROOM

## 2017-12-02 PROCEDURE — 85025 COMPLETE CBC W/AUTO DIFF WBC: CPT

## 2017-12-02 PROCEDURE — 63600175 PHARM REV CODE 636 W HCPCS: Performed by: EMERGENCY MEDICINE

## 2017-12-02 PROCEDURE — 99900035 HC TECH TIME PER 15 MIN (STAT)

## 2017-12-02 PROCEDURE — 80053 COMPREHEN METABOLIC PANEL: CPT

## 2017-12-02 PROCEDURE — 25000003 PHARM REV CODE 250: Performed by: EMERGENCY MEDICINE

## 2017-12-02 PROCEDURE — 25000003 PHARM REV CODE 250: Performed by: INTERNAL MEDICINE

## 2017-12-02 PROCEDURE — 94660 CPAP INITIATION&MGMT: CPT

## 2017-12-02 PROCEDURE — 97110 THERAPEUTIC EXERCISES: CPT

## 2017-12-02 RX ORDER — METOPROLOL TARTRATE 25 MG/1
12.5 TABLET ORAL 2 TIMES DAILY
Status: DISCONTINUED | OUTPATIENT
Start: 2017-12-02 | End: 2017-12-02

## 2017-12-02 RX ORDER — IPRATROPIUM BROMIDE AND ALBUTEROL SULFATE 2.5; .5 MG/3ML; MG/3ML
3 SOLUTION RESPIRATORY (INHALATION) EVERY 8 HOURS
Status: DISCONTINUED | OUTPATIENT
Start: 2017-12-02 | End: 2017-12-02

## 2017-12-02 RX ORDER — TIOTROPIUM BROMIDE 18 UG/1
1 CAPSULE ORAL; RESPIRATORY (INHALATION) DAILY
Status: DISCONTINUED | OUTPATIENT
Start: 2017-12-02 | End: 2017-12-05 | Stop reason: HOSPADM

## 2017-12-02 RX ORDER — FLUTICASONE FUROATE AND VILANTEROL 200; 25 UG/1; UG/1
1 POWDER RESPIRATORY (INHALATION) DAILY
Status: DISCONTINUED | OUTPATIENT
Start: 2017-12-02 | End: 2017-12-05 | Stop reason: HOSPADM

## 2017-12-02 RX ORDER — ISOSORBIDE MONONITRATE 30 MG/1
30 TABLET, EXTENDED RELEASE ORAL DAILY
Status: DISCONTINUED | OUTPATIENT
Start: 2017-12-02 | End: 2017-12-05 | Stop reason: HOSPADM

## 2017-12-02 RX ORDER — IPRATROPIUM BROMIDE AND ALBUTEROL SULFATE 2.5; .5 MG/3ML; MG/3ML
3 SOLUTION RESPIRATORY (INHALATION) EVERY 8 HOURS
Status: DISPENSED | OUTPATIENT
Start: 2017-12-02 | End: 2017-12-03

## 2017-12-02 RX ORDER — AMOXICILLIN AND CLAVULANATE POTASSIUM 875; 125 MG/1; MG/1
1 TABLET, FILM COATED ORAL EVERY 12 HOURS
Status: DISCONTINUED | OUTPATIENT
Start: 2017-12-02 | End: 2017-12-05 | Stop reason: HOSPADM

## 2017-12-02 RX ADMIN — CIPROFLOXACIN HYDROCHLORIDE 1 DROP: 3 SOLUTION/ DROPS OPHTHALMIC at 09:12

## 2017-12-02 RX ADMIN — IPRATROPIUM BROMIDE AND ALBUTEROL SULFATE 3 ML: .5; 3 SOLUTION RESPIRATORY (INHALATION) at 08:12

## 2017-12-02 RX ADMIN — ENOXAPARIN SODIUM 40 MG: 100 INJECTION SUBCUTANEOUS at 04:12

## 2017-12-02 RX ADMIN — CIPROFLOXACIN HYDROCHLORIDE 1 DROP: 3 SOLUTION/ DROPS OPHTHALMIC at 04:12

## 2017-12-02 RX ADMIN — AMOXICILLIN AND CLAVULANATE POTASSIUM 1 TABLET: 875; 125 TABLET, FILM COATED ORAL at 09:12

## 2017-12-02 RX ADMIN — ROSUVASTATIN CALCIUM 20 MG: 10 TABLET, FILM COATED ORAL at 08:12

## 2017-12-02 RX ADMIN — IPRATROPIUM BROMIDE AND ALBUTEROL SULFATE 3 ML: .5; 3 SOLUTION RESPIRATORY (INHALATION) at 01:12

## 2017-12-02 RX ADMIN — IPRATROPIUM BROMIDE AND ALBUTEROL SULFATE 3 ML: .5; 3 SOLUTION RESPIRATORY (INHALATION) at 11:12

## 2017-12-02 RX ADMIN — FLUTICASONE FUROATE AND VILANTEROL TRIFENATATE 1 PUFF: 200; 25 POWDER RESPIRATORY (INHALATION) at 04:12

## 2017-12-02 RX ADMIN — ISOSORBIDE MONONITRATE 30 MG: 30 TABLET, EXTENDED RELEASE ORAL at 08:12

## 2017-12-02 RX ADMIN — PIPERACILLIN AND TAZOBACTAM 4.5 G: 4; .5 INJECTION, POWDER, LYOPHILIZED, FOR SOLUTION INTRAVENOUS; PARENTERAL at 05:12

## 2017-12-02 RX ADMIN — CIPROFLOXACIN HYDROCHLORIDE 1 DROP: 3 SOLUTION/ DROPS OPHTHALMIC at 02:12

## 2017-12-02 RX ADMIN — PREDNISONE 40 MG: 20 TABLET ORAL at 08:12

## 2017-12-02 RX ADMIN — AMOXICILLIN AND CLAVULANATE POTASSIUM 1 TABLET: 875; 125 TABLET, FILM COATED ORAL at 12:12

## 2017-12-02 RX ADMIN — TIOTROPIUM BROMIDE 18 MCG: 18 CAPSULE ORAL; RESPIRATORY (INHALATION) at 04:12

## 2017-12-02 RX ADMIN — VANCOMYCIN HYDROCHLORIDE 1250 MG: 1 INJECTION, POWDER, LYOPHILIZED, FOR SOLUTION INTRAVENOUS at 01:12

## 2017-12-02 RX ADMIN — ASPIRIN 81 MG CHEWABLE TABLET 81 MG: 81 TABLET CHEWABLE at 08:12

## 2017-12-02 RX ADMIN — CIPROFLOXACIN HYDROCHLORIDE 1 DROP: 3 SOLUTION/ DROPS OPHTHALMIC at 05:12

## 2017-12-02 NOTE — ASSESSMENT & PLAN NOTE
Recent reported cardiac workup done at [place] by Dr. Ortiz. Will obtain studies. No reported CP per family. Will continue ASA and statin. Hold BB for bradycardia. Cardiac monitoring

## 2017-12-02 NOTE — SUBJECTIVE & OBJECTIVE
Interval History: feels well. Tolerating PO diet. Bradycardia. On metoprolol    Review of Systems   Constitutional: Negative.    Respiratory: Negative.    Cardiovascular: Negative.    Gastrointestinal: Negative.    Genitourinary: Negative.    Musculoskeletal: Negative.    Neurological: Negative.    Hematological: Negative.    Psychiatric/Behavioral: Negative.      Objective:     Vital Signs (Most Recent):  Temp: 97.8 °F (36.6 °C) (12/02/17 1143)  Pulse: (!) 59 (12/02/17 1143)  Resp: 19 (12/02/17 1143)  BP: (!) 156/78 (12/02/17 1143)  SpO2: 96 % (12/02/17 1143) Vital Signs (24h Range):  Temp:  [97.5 °F (36.4 °C)-98.4 °F (36.9 °C)] 97.8 °F (36.6 °C)  Pulse:  [48-75] 59  Resp:  [17-48] 19  SpO2:  [90 %-100 %] 96 %  BP: (119-184)/(58-88) 156/78     Weight: 80.7 kg (178 lb)  Body mass index is 32.56 kg/m².    Intake/Output Summary (Last 24 hours) at 12/02/17 1152  Last data filed at 12/02/17 0529   Gross per 24 hour   Intake             1125 ml   Output              150 ml   Net              975 ml      Physical Exam   Constitutional: She is oriented to person, place, and time. She appears well-developed. No distress.   Chronically ill appearing.   HENT:   Head: Normocephalic and atraumatic.   Mouth/Throat: Oropharynx is clear and moist.   Eyes: Conjunctivae are normal.   Neck: Normal range of motion. Neck supple.   Cardiovascular: Normal rate, regular rhythm, normal heart sounds and intact distal pulses.    Pulmonary/Chest: Effort normal and breath sounds normal. She has no wheezes. She has no rales.   On low flow NC.    Abdominal: Soft. Bowel sounds are normal.   Musculoskeletal: Normal range of motion.   Neurological: She is alert and oriented to person, place, and time.   Skin: Skin is warm and dry. She is not diaphoretic.   Psychiatric: She has a normal mood and affect. Her behavior is normal. Judgment and thought content normal.   Nursing note and vitals reviewed.      Significant Labs: All pertinent labs within  the past 24 hours have been reviewed.    Significant Imaging: I have reviewed all pertinent imaging results/findings within the past 24 hours.  I have reviewed and interpreted all pertinent imaging results/findings within the past 24 hours.

## 2017-12-02 NOTE — PT/OT/SLP PROGRESS
Physical Therapy  Treatment    Ade Castellano   MRN: [id number]   Admitting Diagnosis: Acute on chronic respiratory failure with hypoxia and hypercapnia    PT Received On: 12/02/17  PT Start Time: 1104     PT Stop Time: 1130    PT Total Time (min): 26 min       Billable Minutes:  Gait [id number] and Therapeutic Exercise 14    Treatment Type: Treatment  PT/PTA: PTA     PTA Visit Number: 1       General Precautions: Standard, fall, respiratory  Orthopedic Precautions: N/A   Braces: N/A    Do you have any cultural, spiritual, Druze conflicts, given your current situation?: none    Subjective:  Communicated with nurse Zhu  prior to session.  Pt agreeable to therapy.     Pain/Comfort  Pain Rating 1: 0/10  Pain Rating Post-Intervention 1: 0/10    Objective:   Patient found with: telemetry, oxygen ( 3 L O2 NC )     Functional Mobility:  Bed Mobility:   Rolling/Turning to Left: Supervision  Scooting/Bridging: Stand by Assistance  Supine to Sit: Stand by Assistance    Transfers: from bed, bedside chair and toilet seat. VC's for safety technique and sequence.   Sit <> Stand Assistance: Contact Guard Assistance  Sit <> Stand Assistive Device: No Assistive Device  Bed > Chair Technique: Stand Pivot  Bed > Chair Assistance: Contact Guard Assistance  Bed > Chair Assistive Device: No Assistive Device  Toilet Transfer Technique: Stand Pivot ( pt ambulated to restroom )   Toilet Transfer Assistance: Contact Guard Assistance  Toilet Transfer Assistive Device: No Assistive Device    Gait:   Gait Distance: ~ 3-4 steps from bed > chair and ~ 10 ftx 2 from chair <> the bathroom. VC's for porper technique and sequence.   Assistance 1: Contact Guard Assistance  Gait Assistive Device: No device, 3 L O2 NC.   Gait Pattern: reciprocal  Gait Deviation(s): decreased abhay, decreased velocity of limb motion, decreased step length    Balance:   Static Sit: GOOD-: Takes MODERATE challenges from all directions but inconsistently  Dynamic  Sit: GOOD-: Maintains balance through MODERATE excursions of active trunk movement,     Static Stand: FAIR+: Takes MINIMAL challenges from all directions  Dynamic stand: FAIR: Needs CONTACT GUARD during gait     Therapeutic Activities and Exercises:  Pt performed bed mobility, transfer and gait training as above.    Pt performed seated BLE x 15 reps : heel/toes raises, LAQ, HS, hip flexion and pillow squeezes. VC's for proper technique and sequence. Pt tolerated well.   AM-PAC 6 CLICK MOBILITY  How much help from another person does this patient currently need?   1 = Unable, Total/Dependent Assistance  2 = A lot, Maximum/Moderate Assistance  3 = A little, Minimum/Contact Guard/Supervision  4 = None, Modified [place]/Independent    Turning over in bed (including adjusting bedclothes, sheets and blankets)?: 4  Sitting down on and standing up from a chair with arms (e.g., wheelchair, bedside commode, etc.): 3  Moving from lying on back to sitting on the side of the bed?: 4  Moving to and from a bed to a chair (including a wheelchair)?: 3  Need to walk in hospital room?: 3  Climbing 3-5 steps with a railing?: 3  Total Score: 20    AM-PAC Raw Score CMS G-Code Modifier Level of Impairment Assistance   6 % Total / Unable   7 - 9 CM 80 - 100% Maximal Assist   10 - 14 CL 60 - 80% Moderate Assist   15 - 19 CK 40 - 60% Moderate Assist   20 - 22 CJ 20 - 40% Minimal Assist   23 CI 1-20% SBA / CGA   24 CH 0% Independent/ Mod I     Patient left up in chair on green air cushion with all lines intact, call button in reach and nurse  notified.    Assessment:  Ade Castellano is a 72 y.o. female with a medical diagnosis of Acute on chronic respiratory failure with hypoxia and hypercapnia and presents with weakness, decreased endurance and functional mobility. Pt is making good progress toward treatment goals. Pt will benefit from further therapy in order to get back to PLOF.    Rehab identified problem list/impairments:  Rehab identified problem list/impairments: weakness, impaired endurance, gait instability, decreased coordination, decreased safety awareness, impaired cardiopulmonary response to activity    Rehab potential is good.    Activity tolerance: Fair    Discharge recommendations:   (ongoing assessment pending pt progress)     Barriers to discharge:   Barriers to Discharge: Decreased caregiver support, Inaccessible home environment    Equipment recommendations:   TBD     GOALS:    Physical Therapy Goals        Problem: Physical Therapy Goal    Goal Priority Disciplines Outcome Goal Variances Interventions   Physical Therapy Goal     PT/OT, PT Ongoing (interventions implemented as appropriate)     Description:  Goals to be met by: 2017     Patient will increase functional independence with mobility by performin. Supine to sit with Modified [place]  2. Sit to stand transfer with Modified [place]  3. Gait  x 250 feet with Modified [place] with or without AD                       PLAN:    Patient to be seen daily  to address the above listed problems via gait training, therapeutic activities, therapeutic exercises  Plan of Care expires: 17  Plan of Care reviewed with: patient, sibling         Vee Poe, PTA  2017

## 2017-12-02 NOTE — PROGRESS NOTES
[facility]  Progress Note    Patient Name: Ade Castellano  MRN: [id number]  Patient Class: IP- Inpatient   Admission Date: 11/29/2017  Length of Stay: 3 days  Attending Physician: Missy Sigala MD  Primary Care Provider: Jeannine Huitron MD        Subjective:     Principal Problem:Acute on chronic respiratory failure with hypoxia and hypercapnia    HPI:  71 y/o female with COPD on O2 @2L, +TOB, recent diagnosis of lung mass seen on CT done at [place] (Dr. Katz, Pulmonary), SUNITHA not tolerant of CPAP, CAD s/p MI with stent, HTN, HLP, and h/o pneumonia and breast CA who presented in severe respiratory distress. Pt was reported to be hypoxic at 80% on 2.5 L home O2 by NC upon EMS arrival.  Pt was in [facility]) from Monday 11/20 to yesterday for the Thanksgiving holiday. Sister reported the patient had progressive SOB and then she ran out of her oxygen. She went to a clinic in [place] prior to her arrival in this state and was given a steroid shot and a prescription for levofloxacin. Sister reports patient had difficulty swallowing for some time, and stated that she never reported having fever.  In the ER, her workup was remarkable for a CTA which showed a large right hilar mass with involvement of adjacent segmental pulmonary arterial branches and bronchi with associated postobstructive atelectasis and volume loss in the right middle lobe with adjacent ground glass opacity along with  mediastinal and axillary adenopathy, with a right axillary lymph node measuring up to 2.0 cm in short axis diameter. Due to her respiratory distress, she was intubated and placed on the vent.    Hospital Course:  Ms. Castellano was admitted for acute on chronic respiratory failure requiring intubation and ventilation. Has large lung mass in right lung with probable post obstructive pneumonia resulting in COPD exacerbation. But also, patient had ran out of home O2 prior to  "onset of symptoms, likely contributing to presentation. Initiated on broad spectrum abx, IV steroids, duo-nebs and pulmonology consulted for ventilator co-management. Successfully extubated to BiPAP on 11/30. Then de-escalated to low flow nasal cannula. Abx tailored to augmentin for broad coverage, including anaerobic bacteria as patient did report an episode of "food coming up while I was sleeping". Speech therapy cleared for dental soft with thin liquids and to keep HOB elevated for at least 30 mins after each meal. No issues with PO intake. Short term steroid treatment. Smoking cessation strongly encouraged. Stepped down to floor on 12/2. PT/OT    Interval History: feels well. Tolerating PO diet. Bradycardia. On metoprolol    Review of Systems   Constitutional: Negative.    Respiratory: Negative.    Cardiovascular: Negative.    Gastrointestinal: Negative.    Genitourinary: Negative.    Musculoskeletal: Negative.    Neurological: Negative.    Hematological: Negative.    Psychiatric/Behavioral: Negative.      Objective:     Vital Signs (Most Recent):  Temp: 97.8 °F (36.6 °C) (12/02/17 1143)  Pulse: (!) 59 (12/02/17 1143)  Resp: 19 (12/02/17 1143)  BP: (!) 156/78 (12/02/17 1143)  SpO2: 96 % (12/02/17 1143) Vital Signs (24h Range):  Temp:  [97.5 °F (36.4 °C)-98.4 °F (36.9 °C)] 97.8 °F (36.6 °C)  Pulse:  [48-75] 59  Resp:  [17-48] 19  SpO2:  [90 %-100 %] 96 %  BP: (119-184)/(58-88) 156/78     Weight: 80.7 kg (178 lb)  Body mass index is 32.56 kg/m².    Intake/Output Summary (Last 24 hours) at 12/02/17 1152  Last data filed at 12/02/17 0529   Gross per 24 hour   Intake             1125 ml   Output              150 ml   Net              975 ml      Physical Exam   Constitutional: She is oriented to person, place, and time. She appears well-developed. No distress.   Chronically ill appearing.   HENT:   Head: Normocephalic and atraumatic.   Mouth/Throat: Oropharynx is clear and moist.   Eyes: Conjunctivae are normal. "   Neck: Normal range of motion. Neck supple.   Cardiovascular: Normal rate, regular rhythm, normal heart sounds and intact distal pulses.    Pulmonary/Chest: Effort normal and breath sounds normal. She has no wheezes. She has no rales.   On low flow NC.    Abdominal: Soft. Bowel sounds are normal.   Musculoskeletal: Normal range of motion.   Neurological: She is alert and oriented to person, place, and time.   Skin: Skin is warm and dry. She is not diaphoretic.   Psychiatric: She has a normal mood and affect. Her behavior is normal. Judgment and thought content normal.   Nursing note and vitals reviewed.      Significant Labs: All pertinent labs within the past 24 hours have been reviewed.    Significant Imaging: I have reviewed all pertinent imaging results/findings within the past 24 hours.  I have reviewed and interpreted all pertinent imaging results/findings within the past 24 hours.    Assessment/Plan:      * Acute on chronic respiratory failure with hypoxia and hypercapnia    Pt chronically on O2 at 2L at baseline for COPD and with SUNITHA, +TOB at baseline with recent new diagnosis of lung mass reported about 1 month ago done at [facility]. Pt has h/o breast CA, therefore, unclear of new primary or reoccurrence of breast CA. She was scheduled for biopsy as outpatient. Pt with very large lung mass with probable post obstructive pneumonia and/or running out of supplemental O2, exacerbating COPD. This was severe enough to require vent support. Successfully extubated on 11/30. De-escalate to augmentin. Taper steroids. Continue supplemental O2 via low flow NC. Duo-nebs PRN. Appreciate further pulm recs          Cavitary pneumonia    As discussed above, on antibiotics as per Pulmonary.          DNR (do not resuscitate)    Sister reported she wanted to be DNR and son in agreement with expressed wishes. Family updated on status and DNR orders written. This was later changed to partial code which includes no chest  compressions, no shock or drugs.         Lung mass    As discussed above. Biopsy deferred to outpatient workup as discussed with Pulmonary.        Tobacco abuse    Smoking cessation counseling when appropriate.        Esophageal dysphagia    Dental soft with thins. Stay up right at least 30 minutes after each meal. Appreciate further recs from ST        Hyperlipidemia LDL goal <70    Continue statin.        COPD exacerbation    As discussed above.        SUNITHA (obstructive sleep apnea)    Reported to be intolerant of CPAP.        History of breast cancer    As discussed above.        CAD (coronary artery disease)    Recent reported cardiac workup done at [place] by Dr. Ortiz. Will obtain studies. No reported CP per family. Will continue ASA and statin. Hold BB for bradycardia. Cardiac monitoring        Benign hypertensive heart disease without heart failure    Continue ASA and statin. Hold BB for bradycardia. Cardiac monitoring          VTE Risk Mitigation         Ordered     enoxaparin injection 40 mg  Daily     Route:  Subcutaneous        11/29/17 0558     Medium Risk of VTE  Once      11/29/17 0558     Place sequential compression device  Until discontinued      11/29/17 0558     Place ZAIDA hose  Until discontinued      11/29/17 0558          Time spent: > 45 minutes in patient's care    Transfer to floor today. PT/OT re-eval for dispo. Home health??    Missy Miranda MD  Department of Hospital Medicine   [facility]

## 2017-12-02 NOTE — ASSESSMENT & PLAN NOTE
Pt chronically on O2 at 2L at baseline for COPD and with SUNITHA, +TOB at baseline with recent new diagnosis of lung mass reported about 1 month ago done at [place]. Pt has h/o breast CA, therefore, unclear of new primary or reoccurrence of breast CA. She was scheduled for biopsy as outpatient. Pt with very large lung mass with probable post obstructive pneumonia and/or running out of supplemental O2, exacerbating COPD. This was severe enough to require vent support. Successfully extubated on 11/30. De-escalate to augmentin. Taper steroids. Continue supplemental O2 via low flow NC. Duo-nebs PRN. Appreciate further pulm recs

## 2017-12-02 NOTE — PLAN OF CARE
Problem: Patient Care Overview  Goal: Plan of Care Review  Outcome: Ongoing (interventions implemented as appropriate)  Pt remains in ICU. Pt awake, alert, and oriented to person, place, time and situation. Vital signs stable. Pt on BiPAP throughout the night, tolerating well. No complaints of SOB. Urine output adequate. No bowel movement this shift. Pt and family updated on plan of care. Pt free of hospital acquired injuries and falls.

## 2017-12-02 NOTE — PROVIDER TRANSFER
"ICU transfer note    72 year old female with COPD on O2 @2L, +TOB, recent diagnosis of lung mass seen on CT done at [place] (Dr. Katz, Pulmonary), SUNITHA not tolerant of CPAP, CAD s/p MI with stent, HTN, HLP, and h/o pneumonia and breast CA admitted for acute on chronic respiratory failure requiring intubation and ventilation. Has large lung mass in right lung with probable post obstructive pneumonia resulting in COPD exacerbation. But also, patient had ran out of home O2 prior to onset of symptoms, likely contributing to presentation. Initiated on broad spectrum abx, IV steroids, duo-nebs and pulmonology consulted for ventilator co-management. Successfully extubated to BiPAP on 11/30. Then de-escalated to low flow nasal cannula. Abx tailored to augmentin for broad coverage, including anaerobic bacteria as patient did report an episode of "food coming up while I was sleeping". Speech therapy cleared for dental soft with thin liquids and to keep HOB elevated for at least 30 mins after each meal. No issues with PO intake. Short term steroid treatment. Smoking cessation strongly encouraged. Stepped down to floor on 12/2. PT/OT to re-eval for dispo. Likely discharge in 1-2 days pending clinical improvement.      "

## 2017-12-02 NOTE — NURSING TRANSFER
Nursing Transfer Note      12/2/2017     Transfer To: tele 340A from     Transfer via wheelchair    Transfer with  to O2, cardiac monitoring    Transported by RN and transport personnel      Medicines sent: abx and eye drops    Chart send with patient: Yes    Notified: sister    Upon arrival to floor: cardiac monitor applied, patient oriented to room, call bell in reach and bed in lowest position

## 2017-12-02 NOTE — PLAN OF CARE
Problem: Physical Therapy Goal  Goal: Physical Therapy Goal  Goals to be met by: 2017     Patient will increase functional independence with mobility by performin. Supine to sit with Modified [place]  2. Sit to stand transfer with Modified [place]  3. Gait  x 250 feet with Modified [place] with or without AD      Outcome: Ongoing (interventions implemented as appropriate)  Pt is progressing well toward treatment goals. Pt will benefit from further therapy in order to get back to PLOF.

## 2017-12-03 PROBLEM — R53.81 DEBILITY: Status: ACTIVE | Noted: 2017-12-03

## 2017-12-03 PROCEDURE — 21400001 HC TELEMETRY ROOM

## 2017-12-03 PROCEDURE — 99900035 HC TECH TIME PER 15 MIN (STAT)

## 2017-12-03 PROCEDURE — 63600175 PHARM REV CODE 636 W HCPCS: Performed by: EMERGENCY MEDICINE

## 2017-12-03 PROCEDURE — 25000242 PHARM REV CODE 250 ALT 637 W/ HCPCS: Performed by: INTERNAL MEDICINE

## 2017-12-03 PROCEDURE — 25000003 PHARM REV CODE 250: Performed by: EMERGENCY MEDICINE

## 2017-12-03 PROCEDURE — 63600175 PHARM REV CODE 636 W HCPCS: Performed by: INTERNAL MEDICINE

## 2017-12-03 PROCEDURE — 94640 AIRWAY INHALATION TREATMENT: CPT

## 2017-12-03 PROCEDURE — 25000003 PHARM REV CODE 250: Performed by: INTERNAL MEDICINE

## 2017-12-03 PROCEDURE — 27000221 HC OXYGEN, UP TO 24 HOURS

## 2017-12-03 PROCEDURE — 97116 GAIT TRAINING THERAPY: CPT

## 2017-12-03 PROCEDURE — 94761 N-INVAS EAR/PLS OXIMETRY MLT: CPT

## 2017-12-03 PROCEDURE — 97110 THERAPEUTIC EXERCISES: CPT

## 2017-12-03 PROCEDURE — 25000003 PHARM REV CODE 250: Performed by: HOSPITALIST

## 2017-12-03 RX ADMIN — AMOXICILLIN AND CLAVULANATE POTASSIUM 1 TABLET: 875; 125 TABLET, FILM COATED ORAL at 10:12

## 2017-12-03 RX ADMIN — PREDNISONE 40 MG: 20 TABLET ORAL at 10:12

## 2017-12-03 RX ADMIN — CIPROFLOXACIN HYDROCHLORIDE 1 DROP: 3 SOLUTION/ DROPS OPHTHALMIC at 10:12

## 2017-12-03 RX ADMIN — ISOSORBIDE MONONITRATE 30 MG: 30 TABLET, EXTENDED RELEASE ORAL at 10:12

## 2017-12-03 RX ADMIN — AMOXICILLIN AND CLAVULANATE POTASSIUM 1 TABLET: 875; 125 TABLET, FILM COATED ORAL at 09:12

## 2017-12-03 RX ADMIN — TIOTROPIUM BROMIDE 18 MCG: 18 CAPSULE ORAL; RESPIRATORY (INHALATION) at 09:12

## 2017-12-03 RX ADMIN — FLUTICASONE FUROATE AND VILANTEROL TRIFENATATE 1 PUFF: 200; 25 POWDER RESPIRATORY (INHALATION) at 09:12

## 2017-12-03 RX ADMIN — CIPROFLOXACIN HYDROCHLORIDE 1 DROP: 3 SOLUTION/ DROPS OPHTHALMIC at 09:12

## 2017-12-03 RX ADMIN — CIPROFLOXACIN HYDROCHLORIDE 1 DROP: 3 SOLUTION/ DROPS OPHTHALMIC at 06:12

## 2017-12-03 RX ADMIN — ENOXAPARIN SODIUM 40 MG: 100 INJECTION SUBCUTANEOUS at 05:12

## 2017-12-03 RX ADMIN — IPRATROPIUM BROMIDE AND ALBUTEROL SULFATE 3 ML: .5; 3 SOLUTION RESPIRATORY (INHALATION) at 09:12

## 2017-12-03 RX ADMIN — RAMELTEON 8 MG: 8 TABLET, FILM COATED ORAL at 09:12

## 2017-12-03 RX ADMIN — RAMELTEON 8 MG: 8 TABLET, FILM COATED ORAL at 12:12

## 2017-12-03 RX ADMIN — CIPROFLOXACIN HYDROCHLORIDE 1 DROP: 3 SOLUTION/ DROPS OPHTHALMIC at 02:12

## 2017-12-03 RX ADMIN — ASPIRIN 81 MG CHEWABLE TABLET 81 MG: 81 TABLET CHEWABLE at 10:12

## 2017-12-03 NOTE — PLAN OF CARE
Problem: Patient Care Overview  Goal: Plan of Care Review  Outcome: Ongoing (interventions implemented as appropriate)   12/03/17 0610   Coping/Psychosocial   Plan Of Care Reviewed With patient       Comments: No acute distress noted. Vitals WNL. Uneventful shift.

## 2017-12-03 NOTE — PT/OT/SLP PROGRESS
Physical Therapy  Treatment    Ade Castellano   MRN: [id number]   Admitting Diagnosis: Acute on chronic respiratory failure with hypoxia and hypercapnia    PT Received On: 12/03/17  PT Start Time: 1120     PT Stop Time: 1144    PT Total Time (min): 24 min       Billable Minutes:  Gait [id number] and Therapeutic Exercise 14    Treatment Type: Treatment  PT/PTA: PTA     PTA Visit Number: 2       General Precautions: Standard, fall, respiratory  Orthopedic Precautions: N/A   Braces: N/A    Do you have any cultural, spiritual, Latter-day conflicts, given your current situation?: none    Subjective:  Communicated with nurse Contreras prior to session.  Pt agreed to therapy    Pain/Comfort  Pain Rating 1: 0/10    Objective:   Patient found with: oxygen, telemetry    Functional Mobility:  Bed Mobility:   Rolling/Turning to Left: Modified independent  Rolling/Turning Right: Modified independent  Scooting/Bridging: Supervision  Supine to Sit: Supervision  Sit to Supine: Supervision    Transfers:  Sit <> Stand Assistance: Contact Guard Assistance  Sit <> Stand Assistive Device: No Assistive Device  Bed <> Chair Technique: Stand Pivot  Bed <> Chair Assistance: Stand By Assistance  Bed <> Chair Assistive Device: No Assistive Device    Gait:   Gait Distance: 180 ft with CGA /s AD /c 3L 02 nc  Assistance 1: Contact Guard Assistance  Gait Assistive Device: No device  Gait Pattern: 2-point gait  Gait Deviation(s): decreased abhay, increased time in double stance, decreased velocity of limb motion, decreased step length, decreased stride length    Stairs:      Balance:   Static Sit: FAIR+: Able to take MINIMAL challenges from all directions  Dynamic Sit: FAIR+: Maintains balance through MINIMAL excursions of active trunk motion  Static Stand: FAIR+: Takes MINIMAL challenges from all directions  Dynamic stand: FAIR: Needs CONTACT GUARD during gait     Therapeutic Activities and Exercises:  BLE Emmanuel seated  x15 ea to include LAQ, HIP  FLEXION/ADD PILLOW SQUEEZES.  HR, TT X 20 ea     AM-PAC 6 CLICK MOBILITY  How much help from another person does this patient currently need?   1 = Unable, Total/Dependent Assistance  2 = A lot, Maximum/Moderate Assistance  3 = A little, Minimum/Contact Guard/Supervision  4 = None, Modified [place]/Independent    Turning over in bed (including adjusting bedclothes, sheets and blankets)?: 4  Sitting down on and standing up from a chair with arms (e.g., wheelchair, bedside commode, etc.): 4  Moving from lying on back to sitting on the side of the bed?: 4  Moving to and from a bed to a chair (including a wheelchair)?: 3  Need to walk in hospital room?: 3  Climbing 3-5 steps with a railing?: 3  Total Score: 21    AM-PAC Raw Score CMS G-Code Modifier Level of Impairment Assistance   6 % Total / Unable   7 - 9 CM 80 - 100% Maximal Assist   10 - 14 CL 60 - 80% Moderate Assist   15 - 19 CK 40 - 60% Moderate Assist   20 - 22 CJ 20 - 40% Minimal Assist   23 CI 1-20% SBA / CGA   24 CH 0% Independent/ Mod I     Patient left supine with all lines intact, call button in reach and sister present.    Assessment:  Ade Castellano is a 72 y.o. female with a medical diagnosis of Acute on chronic respiratory failure with hypoxia and hypercapnia and presents with increased gt distance today with increased time in double stance 2/2 increased SOB with frequent rest breaks and pursed lip breathing.    Rehab identified problem list/impairments: Rehab identified problem list/impairments: weakness, impaired endurance, impaired functional mobilty, gait instability, impaired balance, impaired cardiopulmonary response to activity, decreased safety awareness    Rehab potential is good.    Activity tolerance: Good    Discharge recommendations: Discharge Facility/Level Of Care Needs:  (ongoing assesment pending patient's progress)     Barriers to discharge:      Equipment recommendations:       GOALS:    Physical Therapy Goals         Problem: Physical Therapy Goal    Goal Priority Disciplines Outcome Goal Variances Interventions   Physical Therapy Goal     PT/OT, PT Ongoing (interventions implemented as appropriate)     Description:  Goals to be met by: 2017     Patient will increase functional independence with mobility by performin. Supine to sit with Modified [place]  2. Sit to stand transfer with Modified [place]  3. Gait  x 250 feet with Modified [place] with or without AD                       PLAN:    Patient to be seen daily  to address the above listed problems via gait training, therapeutic activities, therapeutic exercises  Plan of Care expires: 17  Plan of Care reviewed with: patient, sibling         Wisam Bazzi, PTA  2017

## 2017-12-03 NOTE — PLAN OF CARE
Problem: Fall Risk (Adult)  Goal: Identify Related Risk Factors and Signs and Symptoms  Related risk factors and signs and symptoms are identified upon initiation of Human Response Clinical Practice Guideline (CPG)   Outcome: Ongoing (interventions implemented as appropriate)   12/02/17 1818   Fall Risk   Related Risk Factors (Fall Risk) age-related changes;fatigue/slow reaction;depression/anxiety;gait/mobility problems;polypharmacy;environment unfamiliar   Signs and Symptoms (Fall Risk) presence of risk factors     Goal: Absence of Falls  Patient will demonstrate the desired outcomes by discharge/transition of care.   Outcome: Ongoing (interventions implemented as appropriate)   12/02/17 1818   Fall Risk (Adult)   Absence of Falls making progress toward outcome       Problem: Patient Care Overview  Goal: Plan of Care Review  Outcome: Ongoing (interventions implemented as appropriate)   12/02/17 1818   Coping/Psychosocial   Plan Of Care Reviewed With patient;sibling       Problem: Pressure Ulcer Risk (Geremias Scale) (Adult,Obstetrics,Pediatric)  Goal: Identify Related Risk Factors and Signs and Symptoms  Related risk factors and signs and symptoms are identified upon initiation of Human Response Clinical Practice Guideline (CPG)   Outcome: Ongoing (interventions implemented as appropriate)   12/02/17 1818   Pressure Ulcer Risk (Geremias Scale)   Related Risk Factors (Pressure Ulcer Risk (Geremias Scale)) mechanical forces;medication;mobility impaired     Goal: Skin Integrity  Patient will demonstrate the desired outcomes by discharge/transition of care.   Outcome: Ongoing (interventions implemented as appropriate)   12/02/17 1818   Pressure Ulcer Risk (Geremias Scale) (Adult,Obstetrics,Pediatric)   Skin Integrity making progress toward outcome

## 2017-12-03 NOTE — PLAN OF CARE
Problem: Fall Risk (Adult)  Intervention: Monitor/Assist with Self Care   12/02/17 1945 12/03/17 0600   Daily Care Interventions   Self-Care Promotion independence encouraged;BADL personal objects within reach;BADL personal routines maintained;meal setup provided;safe use of adaptive equipment encouraged --    Activity   Activity Assistance Provided --  assistance, stand-by     Intervention: Reduce Risk/Promote Restraint Free Environment   12/03/17 1200   Safety Interventions   Safety Precautions emergency equipment at bedside     Intervention: Review Medications/Identify Contributors to Fall Risk   12/03/17 1200   Safety Interventions   Medication Review/Management medications reviewed     Intervention: Patient Rounds   12/03/17 1200   Safety Interventions   Patient Rounds bed in low position;bed wheels locked;call light in reach;clutter free environment maintained;ID band on;placement of personal items at bedside;toileting offered;visualized patient     Intervention: Safety Promotion/Fall Prevention   12/03/17 1200   Safety Interventions   Safety Promotion/Fall Prevention assistive device/personal item within reach;commode/urinal/bedpan at bedside;Fall Risk reviewed with patient/family;family to remain at bedside;lighting adjusted;side rails raised x 2     Intervention: Safety Precautions   12/03/17 1200   Safety Interventions   Safety Precautions emergency equipment at bedside       Goal: Identify Related Risk Factors and Signs and Symptoms  Related risk factors and signs and symptoms are identified upon initiation of Human Response Clinical Practice Guideline (CPG)   Outcome: Ongoing (interventions implemented as appropriate)   12/02/17 1818   Fall Risk   Related Risk Factors (Fall Risk) age-related changes;fatigue/slow reaction;depression/anxiety;gait/mobility problems;polypharmacy;environment unfamiliar   Signs and Symptoms (Fall Risk) presence of risk factors     Goal: Absence of Falls  Patient will  demonstrate the desired outcomes by discharge/transition of care.   Outcome: Ongoing (interventions implemented as appropriate)   12/02/17 7395   Fall Risk (Adult)   Absence of Falls making progress toward outcome

## 2017-12-03 NOTE — ASSESSMENT & PLAN NOTE
Pt chronically on O2 at 2L at baseline for COPD and with SUNITHA, +TOB at baseline with recent new diagnosis of lung mass reported about 1 month ago done at [place]. Pt has h/o breast CA, therefore, unclear of new primary or reoccurrence of breast CA. She was scheduled for biopsy as outpatient. Pt with very large lung mass with probable post obstructive pneumonia and/or running out of supplemental O2, exacerbating COPD. This was severe enough to require vent support. Successfully extubated on 11/30. De-escalate to augmentin. Taper steroids. Continue supplemental O2 via low flow NC. Duo-nebs PRN. Appreciate further pulm recs

## 2017-12-03 NOTE — PROGRESS NOTES
[facility] Medicine  Progress Note    Patient Name: Ade Castellano  MRN: [id number]  Patient Class: IP- Inpatient   Admission Date: 11/29/2017  Length of Stay: 4 days  Attending Physician: Neftali Renee MD  Primary Care Provider: Jeannine Huitron MD        Subjective:     Principal Problem:Acute on chronic respiratory failure with hypoxia and hypercapnia    HPI:  71 y/o female with COPD on O2 @2L, +TOB, recent diagnosis of lung mass seen on CT done at [place] (Dr. Katz, Pulmonary), SUNITHA not tolerant of CPAP, CAD s/p MI with stent, HTN, HLP, and h/o pneumonia and breast CA who presented in severe respiratory distress. Pt was reported to be hypoxic at 80% on 2.5 L home O2 by NC upon EMS arrival.  Pt was in [facility]) from Monday 11/20 to yesterday for the Thanksgiving holiday. Sister reported the patient had progressive SOB and then she ran out of her oxygen. She went to a clinic in [place] prior to her arrival in this state and was given a steroid shot and a prescription for levofloxacin. Sister reports patient had difficulty swallowing for some time, and stated that she never reported having fever.  In the ER, her workup was remarkable for a CTA which showed a large right hilar mass with involvement of adjacent segmental pulmonary arterial branches and bronchi with associated postobstructive atelectasis and volume loss in the right middle lobe with adjacent ground glass opacity along with  mediastinal and axillary adenopathy, with a right axillary lymph node measuring up to 2.0 cm in short axis diameter. Due to her respiratory distress, she was intubated and placed on the vent.    Hospital Course:  Ms. Castellano was admitted for acute on chronic respiratory failure requiring intubation and ventilation. Has large lung mass in right lung with probable post obstructive pneumonia resulting in COPD exacerbation. But also, patient had ran out of home O2 prior to  "onset of symptoms, likely contributing to presentation. Initiated on broad spectrum abx, IV steroids, duo-nebs and pulmonology consulted for ventilator co-management. Successfully extubated to BiPAP on 11/30. Then de-escalated to low flow nasal cannula. Abx tailored to augmentin for broad coverage, including anaerobic bacteria as patient did report an episode of "food coming up while I was sleeping". Speech therapy cleared for dental soft with thin liquids and to keep HOB elevated for at least 30 mins after each meal. No issues with PO intake. Short term steroid treatment. Smoking cessation strongly encouraged. Stepped down to floor on 12/2. PT/OT evaluated the patient     Interval History: No new issues. Resting comfortably     Review of Systems   Constitutional: Negative for activity change.   Respiratory: Negative for chest tightness and shortness of breath.    Cardiovascular: Negative for chest pain.   Gastrointestinal: Negative for abdominal pain.   Genitourinary: Negative for difficulty urinating.     Objective:     Vital Signs (Most Recent):  Temp: 98 °F (36.7 °C) (12/03/17 0743)  Pulse: 67 (12/03/17 0919)  Resp: 20 (12/03/17 0919)  BP: (!) 161/72 (12/03/17 0743)  SpO2: 95 % (12/03/17 0919) Vital Signs (24h Range):  Temp:  [97.8 °F (36.6 °C)-98.3 °F (36.8 °C)] 98 °F (36.7 °C)  Pulse:  [54-89] 67  Resp:  [17-21] 20  SpO2:  [92 %-99 %] 95 %  BP: (145-173)/(67-78) 161/72     Weight: 80.7 kg (178 lb)  Body mass index is 32.56 kg/m².    Intake/Output Summary (Last 24 hours) at 12/03/17 1059  Last data filed at 12/03/17 1000   Gross per 24 hour   Intake              600 ml   Output              500 ml   Net              100 ml      Physical Exam   Constitutional: She is oriented to person, place, and time. She appears well-developed and well-nourished.   Cardiovascular: Normal rate.  Exam reveals no gallop and no friction rub.    Pulmonary/Chest: Effort normal and breath sounds normal. No respiratory distress. She " has no wheezes. She has no rales.   Neurological: She is alert and oriented to person, place, and time.   Psychiatric: She has a normal mood and affect. Her behavior is normal.   Vitals reviewed.      Significant Labs:   CBC:   Recent Labs  Lab 12/02/17 0158   WBC 6.98   HGB 13.0   HCT 40.0        CMP:   Recent Labs  Lab 12/02/17 0158      K 3.7      CO2 33*   *   BUN 31*   CREATININE 1.0   CALCIUM 9.0   PROT 6.1   ALBUMIN 2.9*   BILITOT 0.4   ALKPHOS 39*   AST 26   ALT 41   ANIONGAP 9   EGFRNONAA 57*       Significant Imaging:     Assessment/Plan:      * Acute on chronic respiratory failure with hypoxia and hypercapnia    Pt chronically on O2 at 2L at baseline for COPD and with SUNITHA, +TOB at baseline with recent new diagnosis of lung mass reported about 1 month ago done at [place]. Pt has h/o breast CA, therefore, unclear of new primary or reoccurrence of breast CA. She was scheduled for biopsy as outpatient. Pt with very large lung mass with probable post obstructive pneumonia and/or running out of supplemental O2, exacerbating COPD. This was severe enough to require vent support. Successfully extubated on 11/30. De-escalate to augmentin. Taper steroids. Continue supplemental O2 via low flow NC. Duo-nebs PRN. Appreciate further pulm recs          Debility    Awaiting final PT/OT recs.  Patient would like to go home with H/H           Cavitary pneumonia    As discussed above, on antibiotics as per Pulmonary.          DNR (do not resuscitate)    Sister reported she wanted to be DNR and son in agreement with expressed wishes. Family updated on status and DNR orders written. This was later changed to partial code which includes no chest compressions, no shock or drugs.         Lung mass    As discussed above. Biopsy deferred to outpatient workup as discussed with Pulmonary.        Tobacco abuse    Smoking cessation counseling when appropriate.        Esophageal dysphagia    Dental  soft with thins. Stay up right at least 30 minutes after each meal. Appreciate further recs from ST        Hyperlipidemia LDL goal <70    Continue statin.        COPD exacerbation    As discussed above.        SUNITHA (obstructive sleep apnea)    Reported to be intolerant of CPAP.        History of breast cancer    As discussed above.        CAD (coronary artery disease)    Recent reported cardiac workup done at [place] by Dr. Ortiz. Will obtain studies. No reported CP per family. Will continue ASA and statin. Hold BB for bradycardia. Cardiac monitoring        Benign hypertensive heart disease without heart failure    Continue ASA and statin. Hold BB for bradycardia. Cardiac monitoring          VTE Risk Mitigation         Ordered     enoxaparin injection 40 mg  Daily     Route:  Subcutaneous        11/29/17 0558     Medium Risk of VTE  Once      11/29/17 0558     Place sequential compression device  Until discontinued      11/29/17 0558     Place ZAIDA hose  Until discontinued      11/29/17 0558          Consider D/C to home on 12/4 with H/H. Follow final PT/OT recs.     Neftali Rolon MD  Department of Hospital Medicine   [facility] [place]

## 2017-12-03 NOTE — SUBJECTIVE & OBJECTIVE
Interval History: No new issues. Resting comfortably     Review of Systems   Constitutional: Negative for activity change.   Respiratory: Negative for chest tightness and shortness of breath.    Cardiovascular: Negative for chest pain.   Gastrointestinal: Negative for abdominal pain.   Genitourinary: Negative for difficulty urinating.     Objective:     Vital Signs (Most Recent):  Temp: 98 °F (36.7 °C) (12/03/17 0743)  Pulse: 67 (12/03/17 0919)  Resp: 20 (12/03/17 0919)  BP: (!) 161/72 (12/03/17 0743)  SpO2: 95 % (12/03/17 0919) Vital Signs (24h Range):  Temp:  [97.8 °F (36.6 °C)-98.3 °F (36.8 °C)] 98 °F (36.7 °C)  Pulse:  [54-89] 67  Resp:  [17-21] 20  SpO2:  [92 %-99 %] 95 %  BP: (145-173)/(67-78) 161/72     Weight: 80.7 kg (178 lb)  Body mass index is 32.56 kg/m².    Intake/Output Summary (Last 24 hours) at 12/03/17 1059  Last data filed at 12/03/17 1000   Gross per 24 hour   Intake              600 ml   Output              500 ml   Net              100 ml      Physical Exam   Constitutional: She is oriented to person, place, and time. She appears well-developed and well-nourished.   Cardiovascular: Normal rate.  Exam reveals no gallop and no friction rub.    Pulmonary/Chest: Effort normal and breath sounds normal. No respiratory distress. She has no wheezes. She has no rales.   Neurological: She is alert and oriented to person, place, and time.   Psychiatric: She has a normal mood and affect. Her behavior is normal.   Vitals reviewed.      Significant Labs:   CBC:   Recent Labs  Lab 12/02/17 0158   WBC 6.98   HGB 13.0   HCT 40.0        CMP:   Recent Labs  Lab 12/02/17 0158      K 3.7      CO2 33*   *   BUN 31*   CREATININE 1.0   CALCIUM 9.0   PROT 6.1   ALBUMIN 2.9*   BILITOT 0.4   ALKPHOS 39*   AST 26   ALT 41   ANIONGAP 9   EGFRNONAA 57*       Significant Imaging:

## 2017-12-03 NOTE — NURSING
Plan of care reviewed with patient and sister at [facility]. Reviewed risk for falls. Bed alarm on and call bell within reach. Instructed to call for assistance. Reviewed risk for pressure ulcer. Patient up to chair for 2 hours today. Stable on 3L NC. Shortness of breath with exertion. Reports no pain. Will continue to monitor.    Audra Brewster RN  12/2/2017  6:31 PM

## 2017-12-03 NOTE — PLAN OF CARE
Problem: Physical Therapy Goal  Goal: Physical Therapy Goal  Goals to be met by: 2017     Patient will increase functional independence with mobility by performin. Supine to sit with Modified [place]  2. Sit to stand transfer with Modified [place]  3. Gait  x 250 feet with Modified [place] with or without AD      Outcome: Ongoing (interventions implemented as appropriate)  Pt had increased gt distance today.  Pt is progressing well towards goals.

## 2017-12-04 LAB
BACTERIA BLD CULT: NORMAL
BACTERIA BLD CULT: NORMAL

## 2017-12-04 PROCEDURE — 94640 AIRWAY INHALATION TREATMENT: CPT

## 2017-12-04 PROCEDURE — 94761 N-INVAS EAR/PLS OXIMETRY MLT: CPT

## 2017-12-04 PROCEDURE — 93005 ELECTROCARDIOGRAM TRACING: CPT

## 2017-12-04 PROCEDURE — 27000221 HC OXYGEN, UP TO 24 HOURS

## 2017-12-04 PROCEDURE — 93010 ELECTROCARDIOGRAM REPORT: CPT | Mod: ,,, | Performed by: INTERNAL MEDICINE

## 2017-12-04 PROCEDURE — 97530 THERAPEUTIC ACTIVITIES: CPT

## 2017-12-04 PROCEDURE — 25000003 PHARM REV CODE 250: Performed by: INTERNAL MEDICINE

## 2017-12-04 PROCEDURE — 21400001 HC TELEMETRY ROOM

## 2017-12-04 PROCEDURE — 63600175 PHARM REV CODE 636 W HCPCS: Performed by: EMERGENCY MEDICINE

## 2017-12-04 PROCEDURE — 25000003 PHARM REV CODE 250: Performed by: HOSPITALIST

## 2017-12-04 PROCEDURE — 97116 GAIT TRAINING THERAPY: CPT

## 2017-12-04 PROCEDURE — 25000003 PHARM REV CODE 250: Performed by: EMERGENCY MEDICINE

## 2017-12-04 PROCEDURE — 99900035 HC TECH TIME PER 15 MIN (STAT)

## 2017-12-04 PROCEDURE — 94660 CPAP INITIATION&MGMT: CPT

## 2017-12-04 PROCEDURE — 63600175 PHARM REV CODE 636 W HCPCS: Performed by: INTERNAL MEDICINE

## 2017-12-04 RX ADMIN — PREDNISONE 40 MG: 20 TABLET ORAL at 09:12

## 2017-12-04 RX ADMIN — ASPIRIN 81 MG CHEWABLE TABLET 81 MG: 81 TABLET CHEWABLE at 09:12

## 2017-12-04 RX ADMIN — ISOSORBIDE MONONITRATE 30 MG: 30 TABLET, EXTENDED RELEASE ORAL at 09:12

## 2017-12-04 RX ADMIN — ACETAMINOPHEN 650 MG: 325 TABLET ORAL at 09:12

## 2017-12-04 RX ADMIN — LIDOCAINE HYDROCHLORIDE: 20 SOLUTION ORAL; TOPICAL at 02:12

## 2017-12-04 RX ADMIN — CIPROFLOXACIN HYDROCHLORIDE 1 DROP: 3 SOLUTION/ DROPS OPHTHALMIC at 05:12

## 2017-12-04 RX ADMIN — TIOTROPIUM BROMIDE 18 MCG: 18 CAPSULE ORAL; RESPIRATORY (INHALATION) at 08:12

## 2017-12-04 RX ADMIN — CIPROFLOXACIN HYDROCHLORIDE 1 DROP: 3 SOLUTION/ DROPS OPHTHALMIC at 09:12

## 2017-12-04 RX ADMIN — FLUTICASONE FUROATE AND VILANTEROL TRIFENATATE 1 PUFF: 200; 25 POWDER RESPIRATORY (INHALATION) at 08:12

## 2017-12-04 RX ADMIN — CIPROFLOXACIN HYDROCHLORIDE 1 DROP: 3 SOLUTION/ DROPS OPHTHALMIC at 02:12

## 2017-12-04 RX ADMIN — ROSUVASTATIN CALCIUM 20 MG: 10 TABLET, FILM COATED ORAL at 09:12

## 2017-12-04 RX ADMIN — AMOXICILLIN AND CLAVULANATE POTASSIUM 1 TABLET: 875; 125 TABLET, FILM COATED ORAL at 09:12

## 2017-12-04 RX ADMIN — ENOXAPARIN SODIUM 40 MG: 100 INJECTION SUBCUTANEOUS at 05:12

## 2017-12-04 NOTE — PLAN OF CARE
Problem: Fall Risk (Adult)  Intervention: Reduce Risk/Promote Restraint Free Environment   17 1158   Safety Interventions   Environmental Safety Modification clutter free environment maintained;lighting adjusted;room near unit station;room organization consistent   Prevent [place] Drop/Fall   Safety/Security Measures bed alarm set     Intervention: Review Medications/Identify Contributors to Fall Risk   17 1158   Safety Interventions   Medication Review/Management medications reviewed     Intervention: Patient Rounds   17 1100   Safety Interventions   Patient Rounds bed in low position;bed wheels locked;call light in reach;clutter free environment maintained;ID band on;placement of personal items at bedside;visualized patient;toileting offered     Intervention: Safety Promotion/Fall Prevention   17 1100   Safety Interventions   Safety Promotion/Fall Prevention bed alarm set;assistive device/personal item within reach;commode/urinal/bedpan at bedside;Fall Risk reviewed with patient/family;lighting adjusted;medications reviewed;nonskid shoes/socks when out of bed;room near unit station;side rails raised x 3;upper side rails raised x 2, lower siderails raised x 1 (Peds only);instructed to call staff for mobility     Intervention: Safety Precautions   17 1800   Safety Interventions   Safety Precautions emergency equipment at bedside       Goal: Identify Related Risk Factors and Signs and Symptoms  Related risk factors and signs and symptoms are identified upon initiation of Human Response Clinical Practice Guideline (CPG)   Outcome: Ongoing (interventions implemented as appropriate)   17 1818   Fall Risk   Related Risk Factors (Fall Risk) age-related changes;fatigue/slow reaction;depression/anxiety;gait/mobility problems;polypharmacy;environment unfamiliar   Signs and Symptoms (Fall Risk) presence of risk factors     Goal: Absence of Falls  Patient will demonstrate the desired  outcomes by discharge/transition of care.   Outcome: Ongoing (interventions implemented as appropriate)   12/04/17 1158   Fall Risk (Adult)   Absence of Falls making progress toward outcome

## 2017-12-04 NOTE — NURSING
Report received from DURAN Lemos. Patient resting comfortably, no complaints, no acute distress noted. Plan of care reviewed with patient. Instructed patient to call for assistance before ambulating, side rails up x3, bed alarm set, call light in reach, non skid socks in use. Patient verbalized understanding of instructions. Will continue to monitor.

## 2017-12-04 NOTE — ASSESSMENT & PLAN NOTE
Pt chronically on O2 at 2L at baseline for COPD and with SUNITHA, +TOB at baseline with recent new diagnosis of lung mass reported about 1 month ago done at [place]. Pt has h/o breast CA, therefore, unclear of new primary or reoccurrence of breast CA. She was scheduled for biopsy as outpatient. Pt with very large lung mass with probable post obstructive pneumonia and/or running out of supplemental O2, exacerbating COPD. This was severe enough to require vent support. Successfully extubated on 11/30. De-escalate to augmentin. Taper steroids. Continue supplemental O2 via low flow NC. Duo-nebs PRN. Appreciate further pulm recs- resolved.

## 2017-12-04 NOTE — SUBJECTIVE & OBJECTIVE
Interval History: not feeling well today and notes her breathing is not at her normal.     Review of Systems   Constitutional: Negative for activity change.   Respiratory: Negative for chest tightness and shortness of breath.    Cardiovascular: Negative for chest pain.   Gastrointestinal: Negative for abdominal pain.   Genitourinary: Negative for difficulty urinating.     Objective:     Vital Signs (Most Recent):  Temp: 97.6 °F (36.4 °C) (12/04/17 0745)  Pulse: 66 (12/04/17 0855)  Resp: 18 (12/04/17 0855)  BP: (!) 143/64 (12/04/17 0745)  SpO2: 96 % (12/04/17 0855) Vital Signs (24h Range):  Temp:  [97.6 °F (36.4 °C)-98.7 °F (37.1 °C)] 97.6 °F (36.4 °C)  Pulse:  [62-79] 66  Resp:  [17-23] 18  SpO2:  [90 %-98 %] 96 %  BP: (136-174)/(64-75) 143/64     Weight: 80.7 kg (178 lb)  Body mass index is 32.56 kg/m².    Intake/Output Summary (Last 24 hours) at 12/04/17 1009  Last data filed at 12/04/17 0800   Gross per 24 hour   Intake              120 ml   Output             1000 ml   Net             -880 ml      Physical Exam   Constitutional: She is oriented to person, place, and time. She appears well-developed and well-nourished.   Cardiovascular: Normal rate.  Exam reveals no gallop and no friction rub.    Pulmonary/Chest: Effort normal and breath sounds normal. No respiratory distress. She has no wheezes. She has no rales.   Neurological: She is alert and oriented to person, place, and time.   Psychiatric: She has a normal mood and affect. Her behavior is normal.   Vitals reviewed.      Significant Labs: CBC: No results for input(s): WBC, HGB, HCT, PLT in the last 48 hours.  CMP: No results for input(s): NA, K, CL, CO2, GLU, BUN, CREATININE, CALCIUM, PROT, ALBUMIN, BILITOT, ALKPHOS, AST, ALT, ANIONGAP, EGFRNONAA in the last 48 hours.    Invalid input(s): [place]    Significant Imaging:

## 2017-12-04 NOTE — NURSING
"0215 - Patient c/o chest pain - states "it feels like indigestion" @ 6/10, MD notified - rec'd telephone order for 12 lead EKG and GI cocktail - Called Respiratory to facilitate Stat EKG    0225 - GI cocktail given - immediate relief of symptoms obtained - EKG showed NSR - Will continue to monitor  "

## 2017-12-04 NOTE — PLAN OF CARE
Problem: Physical Therapy Goal  Goal: Physical Therapy Goal  Goals to be met by: 2017     Patient will increase functional independence with mobility by performin. Supine to sit with Modified [place]  2. Sit to stand transfer with Modified [place]  3. Gait  x 250 feet with Modified [place] with or without AD      Outcome: Ongoing (interventions implemented as appropriate)  Pt increased distance with gait training today. Pt will benefit from further therapy in order to get back to PLOF.

## 2017-12-04 NOTE — PT/OT/SLP PROGRESS
Physical Therapy Treatment    Patient Name:  Ade Castellano   MRN:  [id number]    Recommendations:     Discharge Recommendations:  home health PT   Discharge Equipment Recommendations: none   Barriers to discharge: Inaccessible home     Assessment:     Ade Castellano is a 72 y.o. female admitted with a medical diagnosis of Acute on chronic respiratory failure with hypoxia and hypercapnia.  She presents with the following impairments/functional limitations:  weakness, impaired endurance, gait instability, decreased lower extremity function, decreased upper extremity function, impaired cardiopulmonary response to activity, decreased coordination pt increased distance with gait training today. Pt will benefit from further therapy in order to get back to OF.    Rehab Prognosis:  patient would benefit from acute skilled PT services to address these deficits and reach maximum level of function.      Recent Surgery: * No surgery found *      Plan:     During this hospitalization, patient to be seen daily to address the above listed problems via gait training, therapeutic activities, therapeutic exercises  · Plan of Care Expires:  12/29/17   Plan of Care Reviewed with: patient    Subjective     Communicated with nurse Beronica prior to session.  Patient found in supine upon PT entry to room, agreeable to treatment.      Chief Complaint: : chest pain last night however feeling better now. Pt c/o fatigue.   Patient comments/goals: to go home with her sister.   Pain/Comfort:  · Pain Rating 1: 0/10  · Pain Rating Post-Intervention 1: 0/10    Patients cultural, spiritual, Rastafari conflicts given the current situation: none    Objective:     Patient found with: telemetry, peripheral IV     General Precautions: Standard, fall, respiratory   Orthopedic Precautions:N/A   Braces: N/A     Functional Mobility:  · Bed Mobility:     · Rolling Left:  modified independence  · Rolling Right: modified independence  · Scooting:  supervision  · Bridging: supervision  · Supine to Sit: supervision  · Sit to Supine: supervision  · Transfers:     · Sit to Stand:  stand by assistance with no AD  · Toilet Transfer: stand by assistance and contact guard assistance with  no AD  using ( pt ambulated to the bathroom )  · Gait: pt ambulated ~ 100 ft x 2 with 3L O2 NC ,  using no AD , CGA. Pt required 1 standing rest breaks during gait training 2* fatigue and c/o SOB despite on 3L O2 NC. Noted with slow abhay, mild unsteady gait today however no LOB. VC's for safety technique and sequence. VC's for proper breathing technique.       AM-PAC 6 CLICK MOBILITY  Turning over in bed (including adjusting bedclothes, sheets and blankets)?: 4  Sitting down on and standing up from a chair with arms (e.g., wheelchair, bedside commode, etc.): 4  Moving from lying on back to sitting on the side of the bed?: 4  Moving to and from a bed to a chair (including a wheelchair)?: 3  Need to walk in hospital room?: 3  Climbing 3-5 steps with a railing?: 3  Total Score: 21       Therapeutic Activities and Exercises:   pt performed bed mobility, transfer and gait training as above.     Patient left HOB elevated with all lines intact, call button in reach, nurse Beronica notified and family present..    GOALS:    Physical Therapy Goals        Problem: Physical Therapy Goal    Goal Priority Disciplines Outcome Goal Variances Interventions   Physical Therapy Goal     PT/OT, PT Ongoing (interventions implemented as appropriate)     Description:  Goals to be met by: 2017     Patient will increase functional independence with mobility by performin. Supine to sit with Modified [place]  2. Sit to stand transfer with Modified [place]  3. Gait  x 250 feet with Modified [place] with or without AD                       Time Tracking:     PT Received On: 17  PT Start Time: 1003     PT Stop Time: 1026  PT Total Time (min): 23 min     Billable Minutes: Gait  Training 14 and Therapeutic Activity 9    Treatment Type: Treatment  PT/PTA: PTA     PTA Visit Number: 3     Vee Poe, PTA  12/04/2017

## 2017-12-04 NOTE — PLAN OF CARE
TAY followed up with patient to discuss discharge planning. Pt reported that she plans to return to home where her sister will assist her.  Patient still drives and reported that she prefers morning appointments.  Patient reported that she had home O2 prior to admit and used it at [facility].  Patient's O2 provider is [facility].  Patient stated that she had a sleep study done two years ago and was provided with a BiPAP.  Patient reported that she returned the BiPAP to the company because she didn't like.  She reported that Dr. Katz is her pulmonologist and has all of her records.  Discharge plan is to home with home health.  TAY provided patient with a list of HH providers obtained from her insurance provider's website.  TAY advised patient that only one of the providers ([facility]) on the list actually goes out to her area.  Patient's ch oice form signed for [facility].  Copy of patient's choice form and list of providers place in [facility]'s healthcare folder.  TAY will send notification to [facility] asking if they would be able to accept a new referral.  Sakina Moore LMSW, DAWN-Tay, [facility]  12/4/2017

## 2017-12-05 VITALS
HEART RATE: 56 BPM | OXYGEN SATURATION: 94 % | BODY MASS INDEX: 32.76 KG/M2 | RESPIRATION RATE: 18 BRPM | DIASTOLIC BLOOD PRESSURE: 56 MMHG | HEIGHT: 62 IN | SYSTOLIC BLOOD PRESSURE: 101 MMHG | WEIGHT: 178 LBS | TEMPERATURE: 98 F

## 2017-12-05 PROBLEM — J96.22 ACUTE ON CHRONIC RESPIRATORY FAILURE WITH HYPOXIA AND HYPERCAPNIA: Status: RESOLVED | Noted: 2017-11-29 | Resolved: 2017-12-05

## 2017-12-05 PROBLEM — J96.21 ACUTE ON CHRONIC RESPIRATORY FAILURE WITH HYPOXIA AND HYPERCAPNIA: Status: RESOLVED | Noted: 2017-11-29 | Resolved: 2017-12-05

## 2017-12-05 PROCEDURE — 25000003 PHARM REV CODE 250: Performed by: EMERGENCY MEDICINE

## 2017-12-05 PROCEDURE — 25000003 PHARM REV CODE 250: Performed by: INTERNAL MEDICINE

## 2017-12-05 PROCEDURE — 94640 AIRWAY INHALATION TREATMENT: CPT

## 2017-12-05 PROCEDURE — 25000003 PHARM REV CODE 250: Performed by: HOSPITALIST

## 2017-12-05 PROCEDURE — 94761 N-INVAS EAR/PLS OXIMETRY MLT: CPT

## 2017-12-05 PROCEDURE — 27000221 HC OXYGEN, UP TO 24 HOURS

## 2017-12-05 PROCEDURE — 63600175 PHARM REV CODE 636 W HCPCS: Performed by: INTERNAL MEDICINE

## 2017-12-05 RX ORDER — METOPROLOL TARTRATE 25 MG/1
25 TABLET, FILM COATED ORAL 2 TIMES DAILY
Status: DISCONTINUED | OUTPATIENT
Start: 2017-12-05 | End: 2017-12-05 | Stop reason: HOSPADM

## 2017-12-05 RX ORDER — AMOXICILLIN AND CLAVULANATE POTASSIUM 875; 125 MG/1; MG/1
1 TABLET, FILM COATED ORAL EVERY 12 HOURS
Qty: 10 TABLET | Refills: 0 | Status: SHIPPED | OUTPATIENT
Start: 2017-12-05 | End: 2017-12-10

## 2017-12-05 RX ORDER — CLONIDINE HYDROCHLORIDE 0.1 MG/1
0.1 TABLET ORAL 3 TIMES DAILY PRN
Status: DISCONTINUED | OUTPATIENT
Start: 2017-12-05 | End: 2017-12-05 | Stop reason: HOSPADM

## 2017-12-05 RX ORDER — PREDNISONE 20 MG/1
TABLET ORAL
Qty: 15 TABLET | Refills: 0 | Status: SHIPPED | OUTPATIENT
Start: 2017-12-05 | End: 2018-02-09 | Stop reason: ALTCHOICE

## 2017-12-05 RX ADMIN — CIPROFLOXACIN HYDROCHLORIDE 1 DROP: 3 SOLUTION/ DROPS OPHTHALMIC at 05:12

## 2017-12-05 RX ADMIN — FLUTICASONE FUROATE AND VILANTEROL TRIFENATATE 1 PUFF: 200; 25 POWDER RESPIRATORY (INHALATION) at 10:12

## 2017-12-05 RX ADMIN — CIPROFLOXACIN HYDROCHLORIDE 1 DROP: 3 SOLUTION/ DROPS OPHTHALMIC at 01:12

## 2017-12-05 RX ADMIN — CIPROFLOXACIN HYDROCHLORIDE 1 DROP: 3 SOLUTION/ DROPS OPHTHALMIC at 09:12

## 2017-12-05 RX ADMIN — CLONIDINE HYDROCHLORIDE 0.1 MG: 0.1 TABLET ORAL at 02:12

## 2017-12-05 RX ADMIN — AMOXICILLIN AND CLAVULANATE POTASSIUM 1 TABLET: 875; 125 TABLET, FILM COATED ORAL at 09:12

## 2017-12-05 RX ADMIN — ASPIRIN 81 MG CHEWABLE TABLET 81 MG: 81 TABLET CHEWABLE at 09:12

## 2017-12-05 RX ADMIN — METOPROLOL TARTRATE 25 MG: 25 TABLET ORAL at 09:12

## 2017-12-05 RX ADMIN — TIOTROPIUM BROMIDE 18 MCG: 18 CAPSULE ORAL; RESPIRATORY (INHALATION) at 10:12

## 2017-12-05 RX ADMIN — ISOSORBIDE MONONITRATE 30 MG: 30 TABLET, EXTENDED RELEASE ORAL at 09:12

## 2017-12-05 RX ADMIN — ROSUVASTATIN CALCIUM 20 MG: 10 TABLET, FILM COATED ORAL at 09:12

## 2017-12-05 RX ADMIN — PREDNISONE 40 MG: 20 TABLET ORAL at 09:12

## 2017-12-05 NOTE — PROGRESS NOTES
[facility] [place] - [facility], [place] - [address]  [address]  [facility] [address]  Phone: [phone] Fax: [phone]    Rxs for Augmentin and Prednisone faxed to [facility] at patient's request.  Sakina Moore LMSW, DAWN-Angelica, [facility]  12/5/2017

## 2017-12-05 NOTE — PT/OT/SLP PROGRESS
"Physical Therapy      Patient Name:  Ade Castellano   MRN:  [id number]    Patient not seen today secondary to Patient unwilling to participate pt stated " I am going home soon ". Will follow-up as able.    Vee Poe PTA    "

## 2017-12-05 NOTE — NURSING
Report received from DRUAN Saeed. Patient resting comfortably, no complaints, no acute distress noted. Plan of care reviewed with patient. Instructed patient to call for assistance before ambulating, side rails up x3, bed alarm set, call light in reach, non skid socks in use. Patient verbalized understanding of instructions. Will continue to monitor

## 2017-12-05 NOTE — PLAN OF CARE
[facility]    HOME HEALTH ORDERS  FACE TO FACE ENCOUNTER    Patient Name: Ade Castellano  YOB: 1945    PCP: Jeannine Huitron MD   PCP Address: [address] [place] [address]  PCP Phone Number: [phone]  PCP Fax: [phone]    Encounter Date: 12/05/2017    Admit to Home Health    Diagnoses:  Active Hospital Problems    Diagnosis  POA    Debility [R53.81]  No    Tobacco abuse [Z72.0]  Yes    Lung mass [R91.8]  Yes    DNR (do not resuscitate) [Z66]  Yes    Cavitary pneumonia [J18.9, J98.4]  Yes    Esophageal dysphagia [R13.10]  Yes    Hyperlipidemia LDL goal <70 [E78.5]  Yes     Chronic     HRA 2014      SUNITHA (obstructive sleep apnea) [G47.33]  Yes     Chronic     intolerant to mask      History of breast cancer [Z85.3]  Not Applicable    CAD (coronary artery disease) [I25.10]  Yes     Chronic     Dr. Ortiz      Benign hypertensive heart disease without heart failure [I11.9]  Yes     Chronic     Dr. Ortiz is Cardiologist         Resolved Hospital Problems    Diagnosis Date Resolved POA    *Acute on chronic respiratory failure with hypoxia and hypercapnia [J96.21, J96.22] 12/05/2017 Yes    COPD exacerbation [J44.1] 12/05/2017 Yes     Chronic     Dr. Katz         No future appointments.  Follow-up Information     Jeannine Huitron MD.    Specialty:  Family Medicine  Contact information:  [address] [id number] [phone]                     I have seen and examined this patient face to face today. My clinical findings that support the need for the home health skilled services and home bound status are the following:  Weakness/numbness causing balance and gait disturbance due to Weakness/Debility making it taxing to leave home.    Allergies:Review of patient's allergies indicates:  No Known Allergies    Diet: 2 gram sodium diet    Activities: activity as tolerated    Nursing:   SN to complete comprehensive assessment including  routine vital signs. Instruct on disease process and s/s of complications to report to MD. Review/verify medication list sent home with the patient at time of discharge  and instruct patient/caregiver as needed. Frequency may be adjusted depending on start of care date.    Notify MD if SBP > 160 or < 90; DBP > 90 or < 50; HR > 120 or < 50; Temp > 101; Other:       CONSULTS:    Physical Therapy to evaluate and treat. Evaluate for home safety and equipment needs; Establish/upgrade home exercise program. Perform / instruct on therapeutic exercises, gait training, transfer training, and Range of Motion.      Medications: Review discharge medications with patient and family and provide education.      Current Discharge Medication List      START taking these medications    Details   amoxicillin-clavulanate 875-125mg (AUGMENTIN) 875-125 mg per tablet Take 1 tablet by mouth every 12 (twelve) hours.  Qty: 10 tablet, Refills: 0      predniSONE (DELTASONE) 20 MG tablet 2 tablets po daily x 5 days  1 tablet po daily x 5 days  Qty: 15 tablet, Refills: 0         CONTINUE these medications which have NOT CHANGED    Details   albuterol (PROVENTIL) 2.5 mg /3 mL (0.083 %) nebulizer solution NEBULIZE 1 vial EVERY 6 HOURS AS NEEDED FOR WHEEZING  Qty: 540 mL, Refills: 1      albuterol (VENTOLIN HFA) 90 mcg/actuation inhaler INHALE 2 PUFF(S) BY MOUTH EVERY 4 - 6 HOURS AS NEEDED FOR SHORTNESS OF BREATH or WHEEZING  Qty: 18 g, Refills: 5      ascorbic acid (VITAMIN C) 500 MG tablet Take 500 mg by mouth once daily.      aspirin (ECOTRIN) 325 MG EC tablet Take 325 mg by mouth once daily.      epinastine 0.05 % ophthalmic solution PLACE 1 DROP(S) IN BOTH EYES TWICE DAILY  Refills: 2      fish oil-fat acid comb.8-hb137 1,200 mg Cap Take 1 tablet by mouth once daily.      fluticasone (FLONASE) 50 mcg/actuation nasal spray USE TWO SPRAYS IN EACH NOSTRIL ONCE A DAY  Refills: 6      isosorbide mononitrate (IMDUR) 60 MG 24 hr tablet Refills: 0       metoprolol tartrate (LOPRESSOR) 25 MG tablet Take 25 mg by mouth 2 (two) times daily.       NITROSTAT 0.4 mg SL tablet place 1 tablet under the tongue AS NEEDED no more than 3 in 15 minutes  Qty: 25 tablet, Refills: 0      rosuvastatin (CRESTOR) 20 MG tablet Take 1 tablet (20 mg total) by mouth once daily.  Qty: 90 tablet, Refills: 1      SPIRIVA RESPIMAT 2.5 mcg/actuation Mist Inhale 1 puff into the lungs once daily.      vitamin D 1000 units Tab Take 1,000 mg by mouth once daily.             I certify that this patient is confined to her home and needs physical therapy.

## 2017-12-05 NOTE — PROGRESS NOTES
Nurse, Beronica, advised SW that patient needed a portable O2 tank to discharge to home.  Nurse stated that patient has an empty tank at home in [place].  Nurse stated that patient reported that her O2 provider is [facility].  TAY contacted [facility] at [phone] and spoke with Mario to ask if patient could be provided with a portable O2 tank to discharge to home.  Per mario, a  will deliver a portable to patient's room as soon as he can.  Awaiting delivery of portable O2 tank to room by [employer].  Sakina Moore LMSW, ACNAMITA-TAY, [facility]  12/5/2017

## 2017-12-05 NOTE — PLAN OF CARE
Problem: Patient Care Overview  Goal: Plan of Care Review  Outcome: Ongoing (interventions implemented as appropriate)  Recommendations     Recommendation/Intervention:   1. Continue Rx diet  2.  Encourage increased PO intake at each meal  3.  Honor food requests/preferences within dietary restrictions  4.  RD to follow      Goals: Meet 85% EEN  Nutrition Goal Status: Ongoing   Communication of RD Recs: Reviewed with patient and family member at bedside     Assessment and Plan     Nutrition Dx:   P: Potential for inadequate Energy Intake   E: Related to recent extubation; PO diet advancement   S:  As evidenced by: patient interview; NSG documentation   Nutrition Dx Status: New

## 2017-12-05 NOTE — DISCHARGE SUMMARY
[facility]  Discharge Summary      Patient Name: Ade Castellano  MRN: [id number]  Admission Date: 11/29/2017  Hospital Length of Stay: 6 days  Discharge Date and Time:  12/05/2017 10:41 AM  Attending Physician: Neftali Renee MD   Discharging Provider: Neftali Renee MD  Primary Care Provider: Jeannine Huitron MD      HPI:   73 y/o female with COPD on O2 @2L, +TOB, recent diagnosis of lung mass seen on CT done at [place] (Dr. Katz, Pulmonary), SUNITHA not tolerant of CPAP, CAD s/p MI with stent, HTN, HLP, and h/o pneumonia and breast CA who presented in severe respiratory distress. Pt was reported to be hypoxic at 80% on 2.5 L home O2 by NC upon EMS arrival.  Pt was in [facility]) from Monday 11/20 to yesterday for the Thanksgiving holiday. Sister reported the patient had progressive SOB and then she ran out of her oxygen. She went to a clinic in [place] prior to her arrival in this state and was given a steroid shot and a prescription for levofloxacin. Sister reports patient had difficulty swallowing for some time, and stated that she never reported having fever.  In the ER, her workup was remarkable for a CTA which showed a large right hilar mass with involvement of adjacent segmental pulmonary arterial branches and bronchi with associated postobstructive atelectasis and volume loss in the right middle lobe with adjacent ground glass opacity along with  mediastinal and axillary adenopathy, with a right axillary lymph node measuring up to 2.0 cm in short axis diameter. Due to her respiratory distress, she was intubated and placed on the vent.    * No surgery found *      Hospital Course:   Ms. Castellano was admitted for acute on chronic respiratory failure requiring intubation and ventilation. Has large lung mass in right lung with probable post obstructive pneumonia resulting in COPD exacerbation. But also, patient had ran out of home O2 prior to  "onset of symptoms, likely contributing to presentation. Initiated on broad spectrum abx, IV steroids, duo-nebs and pulmonology consulted for ventilator co-management. Successfully extubated to BiPAP on 11/30. Then de-escalated to low flow nasal cannula. Abx tailored to augmentin for broad coverage, including anaerobic bacteria as patient did report an episode of "food coming up while I was sleeping". Speech therapy cleared for dental soft with thin liquids and to keep HOB elevated for at least 30 mins after each meal. No issues with PO intake. Short term steroid treatment. Smoking cessation strongly encouraged. Stepped down to floor on 12/2. PT/OT evaluated the patient and recommend home with H/H.  The rest of the hospital course was unremarkable. The patient will be discharged to home today. Activity as tolerated. Diet- low NA. Follow up with PCP in one week. Steroid taper.      Patient needs further evaluation of lung mass as outpatient.       Consults:   Consults         Status Ordering Provider     Inpatient consult to Pulmonology  Once     Provider:  Keyon Sanchez MD    Completed BHASKAR CHRISTOPHER          No new Assessment & Plan notes have been filed under this hospital service since the last note was generated.  Service: Hospital Medicine    Final Active Diagnoses:    Diagnosis Date Noted POA    Debility [R53.81] 12/03/2017 No    Tobacco abuse [Z72.0] 11/29/2017 Yes    Lung mass [R91.8] 11/29/2017 Yes    DNR (do not resuscitate) [Z66] 11/29/2017 Yes    Cavitary pneumonia [J18.9, J98.4] 11/29/2017 Yes    Esophageal dysphagia [R13.10] 10/11/2017 Yes    Hyperlipidemia LDL goal <70 [E78.5] 10/14/2014 Yes     Chronic    SUNITHA (obstructive sleep apnea) [G47.33]  Yes     Chronic    History of breast cancer [Z85.3] 07/11/2014 Not Applicable    CAD (coronary artery disease) [I25.10] 08/23/2013 Yes     Chronic    Benign hypertensive heart disease without heart failure [I11.9] 08/23/2013 Yes     " Chronic      Problems Resolved During this Admission:    Diagnosis Date Noted Date Resolved POA    PRINCIPAL PROBLEM:  Acute on chronic respiratory failure with hypoxia and hypercapnia [J96.21, J96.22] 11/29/2017 12/05/2017 Yes    COPD exacerbation [J44.1]  12/05/2017 Yes     Chronic       Discharged Condition: good    Disposition: Home-Health Care [facility]    Follow Up:  Follow-up Information     Jeannine Huitron MD.    Specialty:  Family Medicine  Contact information:  [address] [id number] [phone]                 Patient Instructions:     Diet Low Sodium, 2gm     Activity as tolerated         Significant Diagnostic Studies:     Pending Diagnostic Studies:     None         Medications:  Reconciled Home Medications:   Current Discharge Medication List      START taking these medications    Details   amoxicillin-clavulanate 875-125mg (AUGMENTIN) 875-125 mg per tablet Take 1 tablet by mouth every 12 (twelve) hours.  Qty: 10 tablet, Refills: 0      predniSONE (DELTASONE) 20 MG tablet 2 tablets po daily x 5 days  1 tablet po daily x 5 days  Qty: 15 tablet, Refills: 0         CONTINUE these medications which have NOT CHANGED    Details   albuterol (PROVENTIL) 2.5 mg /3 mL (0.083 %) nebulizer solution NEBULIZE 1 vial EVERY 6 HOURS AS NEEDED FOR WHEEZING  Qty: 540 mL, Refills: 1      albuterol (VENTOLIN HFA) 90 mcg/actuation inhaler INHALE 2 PUFF(S) BY MOUTH EVERY 4 - 6 HOURS AS NEEDED FOR SHORTNESS OF BREATH or WHEEZING  Qty: 18 g, Refills: 5      ascorbic acid (VITAMIN C) 500 MG tablet Take 500 mg by mouth once daily.      aspirin (ECOTRIN) 325 MG EC tablet Take 325 mg by mouth once daily.      epinastine 0.05 % ophthalmic solution PLACE 1 DROP(S) IN BOTH EYES TWICE DAILY  Refills: 2      fish oil-fat acid comb.8-hb137 1,200 mg Cap Take 1 tablet by mouth once daily.      fluticasone (FLONASE) 50 mcg/actuation nasal spray USE TWO SPRAYS IN EACH NOSTRIL ONCE A DAY  Refills: 6      isosorbide  mononitrate (IMDUR) 60 MG 24 hr tablet Refills: 0      metoprolol tartrate (LOPRESSOR) 25 MG tablet Take 25 mg by mouth 2 (two) times daily.       NITROSTAT 0.4 mg SL tablet place 1 tablet under the tongue AS NEEDED no more than 3 in 15 minutes  Qty: 25 tablet, Refills: 0      rosuvastatin (CRESTOR) 20 MG tablet Take 1 tablet (20 mg total) by mouth once daily.  Qty: 90 tablet, Refills: 1      SPIRIVA RESPIMAT 2.5 mcg/actuation Mist Inhale 1 puff into the lungs once daily.      vitamin D 1000 units Tab Take 1,000 mg by mouth once daily.             Indwelling Lines/Drains at time of discharge:   Lines/Drains/Airways          No matching active lines, drains, or airways          Time spent on the discharge of patient: > 30 minutes.   Patient was seen and examined on the date of discharge and determined to be suitable for discharge.         Neftali Rolon MD  Department of Hospital Medicine  [facility]

## 2017-12-05 NOTE — PLAN OF CARE
Problem: Fall Risk (Adult)  Intervention: Monitor/Assist with Self Care   17 215   Daily Care Interventions   Self-Care Promotion independence encouraged;BADL personal routines maintained;BADL personal objects within reach     Intervention: Reduce Risk/Promote Restraint Free Environment   17   Safety Interventions   Environmental Safety Modification clutter free environment maintained;lighting adjusted;room near unit station;room organization consistent   Prevent [place] Drop/Fall   Safety/Security Measures bed alarm set     Intervention: Review Medications/Identify Contributors to Fall Risk   17   Safety Interventions   Medication Review/Management medications reviewed;high risk medications identified     Intervention: Patient Rounds   17 1300   Safety Interventions   Patient Rounds bed in low position;bed wheels locked;call light in reach;clutter free environment maintained;ID band on;placement of personal items at bedside;toileting offered;visualized patient     Intervention: Safety Promotion/Fall Prevention   17 1300   Safety Interventions   Safety Promotion/Fall Prevention bed alarm set;commode/urinal/bedpan at bedside;Fall Risk reviewed with patient/family;lighting adjusted;medications reviewed;room near unit station;nonskid shoes/socks when out of bed;upper side rails raised x 2, lower siderails raised x 1 (Peds only);instructed to call staff for mobility       Goal: Identify Related Risk Factors and Signs and Symptoms  Related risk factors and signs and symptoms are identified upon initiation of Human Response Clinical Practice Guideline (CPG)   Outcome: Ongoing (interventions implemented as appropriate)   17   Fall Risk   Related Risk Factors (Fall Risk) age-related changes;impaired vision;environment unfamiliar   Signs and Symptoms (Fall Risk) presence of risk factors     Goal: Absence of Falls  Patient will demonstrate the desired outcomes by  discharge/transition of care.   Outcome: Ongoing (interventions implemented as appropriate)   12/05/17 0355   Fall Risk (Adult)   Absence of Falls making progress toward outcome

## 2017-12-05 NOTE — PROGRESS NOTES
"[facility]  Adult Nutrition  Progress Note     SUMMARY      Recommendations     Recommendation/Intervention:   1. Continue Rx diet  2.  Encourage increased PO intake at each meal  3.  Honor food requests/preferences within dietary restrictions  4.  RD to follow      Goals: Meet 85% EEN  Nutrition Goal Status: Ongoing   Communication of RD Recs: Reviewed with patient and family member at bedside      Continuum of Care Plan     Referral to Outpatient Services:  (D/C planning: Too soon to determine)     Reason for Assessment     Reason for Assessment: RD follow up   Diagnosis:  (Resp failure)  Relevent Medical History: COPD; Lung mass; breast Ca (stage I)   Interdisciplinary Rounds: did not attend     General Information Comments: Patient TF's discontinued and Dental Soft diet initiated.  Appetite varied consuming % of meals at this time.  States she feels her appetite is slowly returning      Nutrition Prescription Ordered     Current Diet Order: Dental Soft        Evaluation of Received Nutrients/Fluid Intake    Energy Calories Required: not meeting needs     Protein Required: not meeting needs       Fluid Required:  1ml/1kcal or MD Rx    Tolerance: tolerating     Nutrition Risk Screen     Nutrition Risk Screen: no indicators present     Nutrition/Diet History     Food Preferences: No cultural or Orthodox preferences identified   Factors Affecting Nutritional Intake: decreased appetite      Labs/Tests/Procedures/Meds     Pertinent Labs Reviewed: reviewed   Pertinent Medications Reviewed: reviewed      Physical Findings     Overall Physical Appearance: obese  Tubes: orogastric tube  Oral/Mouth Cavity: tooth/teeth missing  Skin: intact     Anthropometrics     Temp: 97.9 °F (36.6 °C)     Height: 5' 2" (157.5 cm)  Weight Method: Bed Scale  Weight: 80.7 kg (178 lb)     Ideal Body Weight (IBW), Female: 110 lb     % Ideal Body Weight, Female (lb): 161.82 lb  BMI (Calculated): 32.6  BMI Grade: 30 - " 34.9- obesity - grade I     Estimated/Assessed Needs     Weight Used For Calorie Calculations: 80.7 kg (177 lb 14.6 oz)      Energy Calorie Requirements (kcal): 1552 (1086= 70%)  Energy Need Method: [place] (modified)     RMR ([place]. Jeor Equation): 1275.25     Weight Used For Protein Calculations: 50 kg (110 lb 3.7 oz) (IBW)  Protein Requirements: 80-100g     Fluid Need Method: RDA Method     RDA Method (mL): 1552       Assessment and Plan     Nutrition Dx:   P: Potential for inadequate Energy Intake   E: Related to recent extubation; PO diet advancement   S:  As evidenced by: patient interview; NSG documentation   Nutrition Dx Status: New        Monitor and Evaluation     Food and Nutrient Intake: energy intake  Food and Nutrient Adminstration: diet order,   Knowledge/Beliefs/Attitudes: food and nutrition knowledge/skill  Physical Activity and Function: nutrition-related ADLs and IADLs  Anthropometric Measurements: weight, weight change  Biochemical Data, Medical Tests and Procedures: electrolyte and renal panel  Nutrition-Focused Physical Findings: overall appearance     Nutrition Risk     Level of Risk:  (2 x week)     Nutrition Follow-Up     RD Follow-up?: Yes

## 2017-12-05 NOTE — PLAN OF CARE
Problem: Fall Risk (Adult)  Intervention: Monitor/Assist with Self Care   17   Functional Level Current   Ambulation 0 - independent --    Transferring 0 - independent --    Toileting 0 - independent --    Bathing 0 - independent --    Dressing 0 - independent --    Eating 0 - independent --    Communication 0 - understands/communicates without difficulty --    Swallowing 0 - swallows foods/liquids without difficulty --    Daily Care Interventions   Self-Care Promotion independence encouraged;BADL personal routines maintained;BADL personal objects within reach --    Activity   Activity Assistance Provided --  assistance, stand-by     Intervention: Reduce Risk/Promote Restraint Free Environment   17   Safety Interventions   Environmental Safety Modification clutter free environment maintained;lighting adjusted;room near unit station;room organization consistent   Prevent  Drop/Fall   Safety/Security Measures bed alarm set     Intervention: Review Medications/Identify Contributors to Fall Risk   17   Safety Interventions   Medication Review/Management medications reviewed;high risk medications identified     Intervention: Patient Rounds   17   Safety Interventions   Patient Rounds bed in low position;bed wheels locked;call light in reach;clutter free environment maintained;ID band on;placement of personal items at bedside;toileting offered;visualized patient     Intervention: Safety Promotion/Fall Prevention   17   Safety Interventions   Safety Promotion/Fall Prevention bed alarm set;Fall Risk reviewed with patient/family;medications reviewed;nonskid shoes/socks when out of bed;room near unit station;side rails raised x 2;instructed to call staff for mobility       Goal: Identify Related Risk Factors and Signs and Symptoms  Related risk factors and signs and symptoms are identified upon initiation of Human Response Clinical Practice Guideline  (CPG)    12/05/17 0355   Fall Risk   Related Risk Factors (Fall Risk) age-related changes;impaired vision;environment unfamiliar   Signs and Symptoms (Fall Risk) presence of risk factors     Goal: Absence of Falls  Patient will demonstrate the desired outcomes by discharge/transition of care.    12/05/17 0355   Fall Risk (Adult)   Absence of Falls making progress toward outcome

## 2017-12-05 NOTE — PROGRESS NOTES
[facility] Medicine  Progress Note    Patient Name: Ade Castellano  MRN: [id number]  Patient Class: IP- Inpatient   Admission Date: 11/29/2017  Length of Stay: 6 days  Attending Physician: Neftali Renee MD  Primary Care Provider: Jeannine Huitron MD        Subjective:     Principal Problem:Acute on chronic respiratory failure with hypoxia and hypercapnia    HPI:  73 y/o female with COPD on O2 @2L, +TOB, recent diagnosis of lung mass seen on CT done at [place] (Dr. Katz, Pulmonary), SUNITHA not tolerant of CPAP, CAD s/p MI with stent, HTN, HLP, and h/o pneumonia and breast CA who presented in severe respiratory distress. Pt was reported to be hypoxic at 80% on 2.5 L home O2 by NC upon EMS arrival.  Pt was in [facility]) from Monday 11/20 to yesterday for the Thanksgiving holiday. Sister reported the patient had progressive SOB and then she ran out of her oxygen. She went to a clinic in [place] prior to her arrival in this state and was given a steroid shot and a prescription for levofloxacin. Sister reports patient had difficulty swallowing for some time, and stated that she never reported having fever.  In the ER, her workup was remarkable for a CTA which showed a large right hilar mass with involvement of adjacent segmental pulmonary arterial branches and bronchi with associated postobstructive atelectasis and volume loss in the right middle lobe with adjacent ground glass opacity along with  mediastinal and axillary adenopathy, with a right axillary lymph node measuring up to 2.0 cm in short axis diameter. Due to her respiratory distress, she was intubated and placed on the vent.    Hospital Course:  Ms. Castellano was admitted for acute on chronic respiratory failure requiring intubation and ventilation. Has large lung mass in right lung with probable post obstructive pneumonia resulting in COPD exacerbation. But also, patient had ran out of home O2 prior to  "onset of symptoms, likely contributing to presentation. Initiated on broad spectrum abx, IV steroids, duo-nebs and pulmonology consulted for ventilator co-management. Successfully extubated to BiPAP on 11/30. Then de-escalated to low flow nasal cannula. Abx tailored to augmentin for broad coverage, including anaerobic bacteria as patient did report an episode of "food coming up while I was sleeping". Speech therapy cleared for dental soft with thin liquids and to keep HOB elevated for at least 30 mins after each meal. No issues with PO intake. Short term steroid treatment. Smoking cessation strongly encouraged. Stepped down to floor on 12/2. PT/OT evaluated the patient and recommend home with H/H.  The rest of the hospital course was unremarkable. The patient will be discharged to home today. Activity as tolerated. Diet- low NA. Follow up with PCP in one week. Steroid taper.     Interval History: Doing well. States her breathing is at her baseline.     Review of Systems   Constitutional: Negative for activity change.   Respiratory: Negative for chest tightness and shortness of breath.    Cardiovascular: Negative for chest pain.   Gastrointestinal: Negative for abdominal pain.   Genitourinary: Negative for difficulty urinating.     Objective:     Vital Signs (Most Recent):  Temp: 97.8 °F (36.6 °C) (12/05/17 0739)  Pulse: 82 (12/05/17 0739)  Resp: 18 (12/05/17 0739)  BP: 134/78 (12/05/17 0739)  SpO2: (!) 93 % (12/05/17 0739) Vital Signs (24h Range):  Temp:  [97.8 °F (36.6 °C)-98.4 °F (36.9 °C)] 97.8 °F (36.6 °C)  Pulse:  [60-82] 82  Resp:  [17-20] 18  SpO2:  [93 %-95 %] 93 %  BP: (117-200)/() 134/78     Weight: 80.7 kg (178 lb)  Body mass index is 32.56 kg/m².    Intake/Output Summary (Last 24 hours) at 12/05/17 1036  Last data filed at 12/05/17 0800   Gross per 24 hour   Intake              520 ml   Output              200 ml   Net              320 ml      Physical Exam   Constitutional: She is oriented to " person, place, and time. She appears well-developed and well-nourished.   Cardiovascular: Normal rate.  Exam reveals no gallop and no friction rub.    Pulmonary/Chest: Effort normal and breath sounds normal. No respiratory distress. She has no wheezes. She has no rales.   Neurological: She is alert and oriented to person, place, and time.   Psychiatric: She has a normal mood and affect. Her behavior is normal.   Vitals reviewed.      Significant Labs: BMP: No results for input(s): GLU, NA, K, CL, CO2, BUN, CREATININE, CALCIUM, MG in the last 48 hours.  CBC: No results for input(s): WBC, HGB, HCT, PLT in the last 48 hours.    Significant Imaging:     Assessment/Plan:      * Acute on chronic respiratory failure with hypoxia and hypercapnia    Pt chronically on O2 at 2L at baseline for COPD and with SUNITHA, +TOB at baseline with recent new diagnosis of lung mass reported about 1 month ago done at [place]. Pt has h/o breast CA, therefore, unclear of new primary or reoccurrence of breast CA. She was scheduled for biopsy as outpatient. Pt with very large lung mass with probable post obstructive pneumonia and/or running out of supplemental O2, exacerbating COPD. This was severe enough to require vent support. Successfully extubated on 11/30. De-escalate to augmentin. Taper steroids. Continue supplemental O2 via low flow NC. Duo-nebs PRN. Appreciate further pulm recs- resolved.- now at baseline. Patient is home 02 dependent. Will discharge to home.           Debility    Awaiting final PT/OT recs.  Patient would like to go home with H/H- PT agrees           Cavitary pneumonia    As discussed above, on antibiotics as per Pulmonary.          DNR (do not resuscitate)    Sister reported she wanted to be DNR and son in agreement with expressed wishes. Family updated on status and DNR orders written. This was later changed to partial code which includes no chest compressions, no shock or drugs.         Lung mass    As  discussed above. Biopsy deferred to outpatient workup as discussed with Pulmonary.        Tobacco abuse    Smoking cessation counseling when appropriate.        Esophageal dysphagia    Dental soft with thins. Stay up right at least 30 minutes after each meal. Appreciate further recs from ST        Hyperlipidemia LDL goal <70    Continue statin.        COPD exacerbation    As discussed above. Will wean steroids         SUNITHA (obstructive sleep apnea)    Reported to be intolerant of CPAP.        History of breast cancer    As discussed above.        CAD (coronary artery disease)    Recent reported cardiac workup done at [place] by Dr. Ortiz. Will obtain studies. No reported CP per family. Will continue ASA and statin. Hold BB for bradycardia. Cardiac monitoring        Benign hypertensive heart disease without heart failure    Continue ASA and statin. Hold BB for bradycardia. Cardiac monitoring          VTE Risk Mitigation         Ordered     enoxaparin injection 40 mg  Daily     Route:  Subcutaneous        11/29/17 0558     Medium Risk of VTE  Once      11/29/17 0558     Place sequential compression device  Until discontinued      11/29/17 0558     Place ZAIDA hose  Until discontinued      11/29/17 0558        Will d/c to home.         Neftali Rolon MD  Department of Hospital Medicine   [facility]

## 2017-12-05 NOTE — SUBJECTIVE & OBJECTIVE
Interval History: Doing well. States her breathing is at her baseline.     Review of Systems   Constitutional: Negative for activity change.   Respiratory: Negative for chest tightness and shortness of breath.    Cardiovascular: Negative for chest pain.   Gastrointestinal: Negative for abdominal pain.   Genitourinary: Negative for difficulty urinating.     Objective:     Vital Signs (Most Recent):  Temp: 97.8 °F (36.6 °C) (12/05/17 0739)  Pulse: 82 (12/05/17 0739)  Resp: 18 (12/05/17 0739)  BP: 134/78 (12/05/17 0739)  SpO2: (!) 93 % (12/05/17 0739) Vital Signs (24h Range):  Temp:  [97.8 °F (36.6 °C)-98.4 °F (36.9 °C)] 97.8 °F (36.6 °C)  Pulse:  [60-82] 82  Resp:  [17-20] 18  SpO2:  [93 %-95 %] 93 %  BP: (117-200)/() 134/78     Weight: 80.7 kg (178 lb)  Body mass index is 32.56 kg/m².    Intake/Output Summary (Last 24 hours) at 12/05/17 1036  Last data filed at 12/05/17 0800   Gross per 24 hour   Intake              520 ml   Output              200 ml   Net              320 ml      Physical Exam   Constitutional: She is oriented to person, place, and time. She appears well-developed and well-nourished.   Cardiovascular: Normal rate.  Exam reveals no gallop and no friction rub.    Pulmonary/Chest: Effort normal and breath sounds normal. No respiratory distress. She has no wheezes. She has no rales.   Neurological: She is alert and oriented to person, place, and time.   Psychiatric: She has a normal mood and affect. Her behavior is normal.   Vitals reviewed.      Significant Labs: BMP: No results for input(s): GLU, NA, K, CL, CO2, BUN, CREATININE, CALCIUM, MG in the last 48 hours.  CBC: No results for input(s): WBC, HGB, HCT, PLT in the last 48 hours.    Significant Imaging:

## 2017-12-05 NOTE — ASSESSMENT & PLAN NOTE
Pt chronically on O2 at 2L at baseline for COPD and with SUNITHA, +TOB at baseline with recent new diagnosis of lung mass reported about 1 month ago done at [place]. Pt has h/o breast CA, therefore, unclear of new primary or reoccurrence of breast CA. She was scheduled for biopsy as outpatient. Pt with very large lung mass with probable post obstructive pneumonia and/or running out of supplemental O2, exacerbating COPD. This was severe enough to require vent support. Successfully extubated on 11/30. De-escalate to augmentin. Taper steroids. Continue supplemental O2 via low flow NC. Duo-nebs PRN. Appreciate further pulm recs- resolved.- now at baseline. Patient is home 02 dependent. Will discharge to home.

## 2017-12-05 NOTE — PLAN OF CARE
Post discharge f/u with PCP  scheduled  for 12/12/2017 at 8:30 a.m.  SW reviewed Written Healthare Instructions/Followup appts reviewed with patient and sister.  Case Management's Education Sheet on Hypoxia/Respiratory Failure reviewed with patient and sister.  Patient ready for discharge when portable O2 is delivered by [facility].       12/05/17 1123   Final Note   Assessment Type Final Discharge Note   Discharge Disposition Home-Health   Hospital Follow Up  Appt(s) scheduled? Yes   Discharge plans and expectations educations in teach back method with documentation complete? Yes   Right Care Referral Info   Post Acute Recommendation Home-care   Referral Type Home Care   Facility Name [facility]   [address] [place] [address]   Sakina Moore LMSw, DAWN-Angelica, CCM  12/5/2017

## 2017-12-05 NOTE — PROGRESS NOTES
WRITTEN HEALTHCARE INFORMATION/FOLLOWUP APPOINTMENTS:    Follow-up Information     Jeannine Huitron MD On 12/12/2017.    Specialty:  Family Medicine  Why:  8:30 a.m. on Tuesday, December 12, 2017 see Dr. Huitron for your hospital followup visit.  Contact information:  [address] [place] [address]  [phone]             [facility].    Specialties:  DME Provider, Home Health Services  Why:  Home Health  Contact information:  [address] [place] [address]  [phone]               [facility]  Unless otherwise directed by your provider, please   contact [facility], our nurse care line   that is available for 24/7 assistance.      [phone] (toll-free)      Registered nurses in the [facility] provide: appointment scheduling, clinical advisement, health education, and other advisory services.    Thank you for choosing [facility] for your care.  Within 48-72 hours after leaving the hospital you will receive a call from [facility] nurses following up to see how you are doing.  The team will ask you a few questions and the call will last approximately 20 minutes.    Please answer any calls you may receive from [facility].  We want to continue to support you as you manage your healthcare needs.  [facility] is happy to have the opportunity to serve you.    Sincerely      Sakina-  Your [facility]  [phone]

## 2017-12-05 NOTE — NURSING
Report given to DURAN Saeed. Patient resting comfortably, no complaints, no acute distress noted. Will continue to monitor.

## 2017-12-07 ENCOUNTER — PATIENT OUTREACH (OUTPATIENT)
Dept: ADMINISTRATIVE | Facility: CLINIC | Age: 72
End: 2017-12-07

## 2017-12-07 NOTE — PROGRESS NOTES
C3 nurse attempted to contact patient. No answer. The following message was left for the patient to return the call:  Good morning  I am a nurse calling on behalf of [facility] from the [facility] Coordination Center.  This is a Transitional Care Call for Ade Castellano . When you have a moment please contact us at [phone] or [phone] Monday through Friday, between the hours of 8 am to 4 pm. We look forward to speaking with you. On behalf of [facility] have a nice day.    The patient has a scheduled HOSFU appointment with Jeannine Huitron MD  on 12/12 @ 0830. Message sent to Physician staff.

## 2017-12-07 NOTE — Clinical Note
Please forward this important TCC information to your provider in order to maximize the post discharge care delivery of this patient.  C3 nurse attempted to contact Ade Castellano  for a TCC post hospital discharge follow up call. No answer. C3 nurse left a voice message and OOC # given. The patient has a scheduled HOSFU appointment with Jeannine Huitron MD  on 1212 @ 8786.   Respectfully, Dian Aly RN  [facility] C3  [email or web address]  Please do not reply to this message, as this inbox is not routinely monitored.

## 2017-12-07 NOTE — PT/OT/SLP DISCHARGE
Physical Therapy Discharge Summary    Name: Ade Castellano  MRN: [id number]   Principal Problem: Acute on chronic respiratory failure with hypoxia and hypercapnia     Patient Discharged from acute Physical Therapy on 17.  Please refer to prior PT noted date on 17 for functional status.     Assessment:     Goals partially met. Patient appropriate for care in another setting.    Objective:     GOALS:    Physical Therapy Goals     Not on file          Multidisciplinary Problems (Resolved)        Problem: Physical Therapy Goal    Goal Priority Disciplines Outcome Goal Variances Interventions   Physical Therapy Goal   (Resolved)     PT/OT, PT Outcome(s) achieved     Description:  Goals to be met by: 2017     Patient will increase functional independence with mobility by performin. Supine to sit with Modified [place]  2. Sit to stand transfer with Modified [place]  3. Gait  x 250 feet with Modified [place] with or without AD                       Reasons for Discontinuation of Therapy Services  Transfer to alternate level of care.      Plan:     Patient Discharged to: Home with Home Health Service.    Luz Elena Fernandes, PT  2017

## 2018-01-23 ENCOUNTER — TELEPHONE (OUTPATIENT)
Dept: HEMATOLOGY/ONCOLOGY | Facility: CLINIC | Age: 73
End: 2018-01-23

## 2018-01-23 ENCOUNTER — OFFICE VISIT (OUTPATIENT)
Dept: HEMATOLOGY/ONCOLOGY | Facility: CLINIC | Age: 73
End: 2018-01-23
Payer: MEDICARE

## 2018-01-23 ENCOUNTER — LAB VISIT (OUTPATIENT)
Dept: LAB | Facility: HOSPITAL | Age: 73
End: 2018-01-23
Attending: INTERNAL MEDICINE
Payer: MEDICARE

## 2018-01-23 VITALS
WEIGHT: 172.81 LBS | TEMPERATURE: 98 F | DIASTOLIC BLOOD PRESSURE: 70 MMHG | HEIGHT: 62 IN | HEART RATE: 53 BPM | BODY MASS INDEX: 31.8 KG/M2 | OXYGEN SATURATION: 94 % | SYSTOLIC BLOOD PRESSURE: 110 MMHG

## 2018-01-23 DIAGNOSIS — R91.1 LUNG NODULE: ICD-10-CM

## 2018-01-23 DIAGNOSIS — Z85.3 HISTORY OF BREAST CANCER: Primary | ICD-10-CM

## 2018-01-23 DIAGNOSIS — R91.8 ABNORMAL CT SCAN OF LUNG: ICD-10-CM

## 2018-01-23 DIAGNOSIS — R91.1 LUNG NODULE: Primary | ICD-10-CM

## 2018-01-23 DIAGNOSIS — R91.8 LUNG MASS: ICD-10-CM

## 2018-01-23 LAB
ALBUMIN SERPL BCP-MCNC: 3.9 G/DL
ALP SERPL-CCNC: 48 U/L
ALT SERPL W/O P-5'-P-CCNC: 14 U/L
ANION GAP SERPL CALC-SCNC: 9 MMOL/L
AST SERPL-CCNC: 15 U/L
BASOPHILS # BLD AUTO: 0.07 K/UL
BASOPHILS NFR BLD: 1.1 %
BILIRUB SERPL-MCNC: 0.5 MG/DL
BUN SERPL-MCNC: 13 MG/DL
CALCIUM SERPL-MCNC: 9.5 MG/DL
CHLORIDE SERPL-SCNC: 102 MMOL/L
CO2 SERPL-SCNC: 29 MMOL/L
CREAT SERPL-MCNC: 0.8 MG/DL
DIFFERENTIAL METHOD: NORMAL
EOSINOPHIL # BLD AUTO: 0.4 K/UL
EOSINOPHIL NFR BLD: 6.6 %
ERYTHROCYTE [DISTWIDTH] IN BLOOD BY AUTOMATED COUNT: 13.9 %
EST. GFR  (AFRICAN AMERICAN): >60 ML/MIN/1.73 M^2
EST. GFR  (NON AFRICAN AMERICAN): >60 ML/MIN/1.73 M^2
GLUCOSE SERPL-MCNC: 133 MG/DL
HCT VFR BLD AUTO: 39.8 %
HGB BLD-MCNC: 13.5 G/DL
LYMPHOCYTES # BLD AUTO: 1.4 K/UL
LYMPHOCYTES NFR BLD: 21.8 %
MCH RBC QN AUTO: 29.8 PG
MCHC RBC AUTO-ENTMCNC: 33.9 G/DL
MCV RBC AUTO: 88 FL
MONOCYTES # BLD AUTO: 0.6 K/UL
MONOCYTES NFR BLD: 9 %
NEUTROPHILS # BLD AUTO: 3.9 K/UL
NEUTROPHILS NFR BLD: 61.3 %
PLATELET # BLD AUTO: 280 K/UL
PMV BLD AUTO: 9.6 FL
POTASSIUM SERPL-SCNC: 4.2 MMOL/L
PROT SERPL-MCNC: 7.2 G/DL
RBC # BLD AUTO: 4.53 M/UL
SODIUM SERPL-SCNC: 140 MMOL/L
WBC # BLD AUTO: 6.41 K/UL

## 2018-01-23 PROCEDURE — 99214 OFFICE O/P EST MOD 30 MIN: CPT | Mod: S$GLB,,, | Performed by: INTERNAL MEDICINE

## 2018-01-23 PROCEDURE — 85025 COMPLETE CBC W/AUTO DIFF WBC: CPT

## 2018-01-23 PROCEDURE — 80053 COMPREHEN METABOLIC PANEL: CPT

## 2018-01-23 PROCEDURE — 99999 PR PBB SHADOW E&M-EST. PATIENT-LVL V: CPT | Mod: PBBFAC,,, | Performed by: INTERNAL MEDICINE

## 2018-01-23 PROCEDURE — 36415 COLL VENOUS BLD VENIPUNCTURE: CPT

## 2018-01-23 PROCEDURE — 99499 UNLISTED E&M SERVICE: CPT | Mod: S$GLB,,, | Performed by: INTERNAL MEDICINE

## 2018-01-23 NOTE — PROGRESS NOTES
Subjective:       Patient ID: Ade Castellano is a 72 y.o. female.    Chief Complaint: Follow-up    HPI     PT LOST TO FOLLOW-UP   LAST VISIT 2015     Diagnosis: Breast CA  HPI 71 y/o female seen today for f/u  History of Stage 1A breast cancer Grade 3, ER/[place] neg ,   Her 2 jose maria neg s/p lumpectomy 7/11/2014 and s/p adjuvant RT 9/2014 here for f/u . Pt declined   Adjuvant chemo.    Lost to follow-up        Pt hospitalized 11/2017   Ms. Castellano was admitted for acute on chronic respiratory failure requiring intubation and ventilation. Has large lung mass in right lung with probable post obstructive pneumonia resulting in COPD exacerbation. But also, patient had ran out of home O2 prior to onset of symptoms, likely contributing to presentation. Initiated on broad spectrum abx, IV steroids, duo-nebs and pulmonology consulted for ventilator co-management. Successfully extubated to BiPAP on 11/30. Then de-escalated to low flow nasal cannula. Abx tailored to augmentin for broad coverage, including anaerobic bacteria /     CTA chest 11/29/2017 reveals   Large right hilar mass with involvement of adjacent segmental pulmonary arterial branches and bronchi with associated postobstructive atelectasis and volume loss in the right middle lobe with adjacent ground glass opacity.  Findings highly concerning for underlying neoplastic process. Mediastinal and axillary adenopathy, with a right axillary lymph node measuring up to 2.0 cm in short axis diameter. No definite evidence of pulmonary thromboembolism.    She is followed by Pulm  She underwent rt axillary LN bx at outside facility- inconclusive  Lung biopsy planned this week  at [facility]     She denies cough/CP.  No hemoptysis   She reports progressive fatigue x 2 mos   Appetite and weight stable  No HA/vision changes     Pt wearing O2 at night      Mammo 6/12/2017   Impression:  No mammographic evidence of malignancy.     BI-RADS Category 1: Negative            PrevHx:She had an  abnormal mammogram 6/4/2014 which  Revealed a round solid mass 6mm in rt breast.  She then underwent U/S guided core bx of rt breast mass on 6/17/2014.  Pathology revealed infiltrating ductal carcinoma Grade 3 with tumor  Present in thin-walled spaces suggestive of lymphatic spaces. Hormone  Receptor status on tumor specimen revealed ER negative 0% ,  OR negative 0% and Her 2 jose maria negative.  She subsequently underwent rt segmental mastectomy and SLN bx  On 7/11/2014. Pathology of rt breast lumpectomy revealed invasive  Ductal carcinoma with micropapillary pattern ( Invasive micropapillary  Ca) with max tumor dimension 11.5mm with suggestion of tumor in   Thin walled spaces c/w lymphovascular involvement. Surgical  Margins free of tumor, grade 3, ER/OR neg , Her 2 jose maria neg   With [place] Lymph node negative for Neoplasm.   Pathologic staging [id number](i-)  Her mother was diagnosed with breast cancer in her 50's/  She has no family history of ovarian cancer.         Pt reports sometimes has rt sided chest discomfort    No cough  No hemoptysis  No wt loss  Appetite stable    She underwent rt axillary LN bx  At [facility] - inconclusive    Lung biopsy planned Thursday at [facility]     1. Large right hilar mass with involvement of adjacent segmental pulmonary arterial branches and bronchi with associated postobstructive atelectasis and volume loss in the right middle lobe with adjacent ground glass opacity.  Findings highly concerning for underlying neoplastic process.    2. Mediastinal and axillary adenopathy, with a right axillary lymph node measuring up to 2.0 cm in short axis diameter.    3. No definite evidence of pulmonary thromboembolism.      Review of Systems   Constitutional: Positive for fatigue. Negative for appetite change, fever and unexpected weight change.   HENT: Negative for mouth sores.    Eyes: Negative for visual disturbance.   Respiratory: Negative for cough and shortness of breath.    Cardiovascular: Negative  "for chest pain.   Gastrointestinal: Negative for abdominal pain and diarrhea.   Genitourinary: Negative for frequency.   Musculoskeletal: Negative for back pain.   Skin: Negative for rash.   Neurological: Negative for headaches.   Hematological: Negative for adenopathy.   Psychiatric/Behavioral: The patient is not nervous/anxious.        Objective:        Vitals:    01/23/18 0819 01/23/18 0821   BP: 110/70    BP Location: Left arm    Patient Position: Sitting    BP Method: Medium (Automatic)    Pulse: (!) 53 (!) 53   Temp: 97.9 °F (36.6 °C)    TempSrc: Oral    SpO2: (!) 91% (!) 94%   Weight: 78.4 kg (172 lb 13.5 oz)    Height: 5' 2" (1.575 m)          Physical Exam   Constitutional: She is oriented to person, place, and time. She appears well-developed and well-nourished.   HENT:   Head: Normocephalic.   Mouth/Throat: Oropharynx is clear and moist. No oropharyngeal exudate.   Eyes: Conjunctivae and lids are normal. Pupils are equal, round, and reactive to light. No scleral icterus.   Neck: Normal range of motion. Neck supple. No thyromegaly present.   Cardiovascular: Normal rate, regular rhythm and normal heart sounds.    No murmur heard.  Pulmonary/Chest: Breath sounds normal. She has no wheezes. She has no rales.   Abdominal: Soft. Bowel sounds are normal. She exhibits no distension and no mass. There is no hepatosplenomegaly. There is no tenderness. There is no rebound and no guarding.   Musculoskeletal: Normal range of motion. She exhibits no edema or tenderness.   Lymphadenopathy:     She has no cervical adenopathy.     She has no axillary adenopathy.        Right: No supraclavicular adenopathy present.        Left: No supraclavicular adenopathy present.   Neurological: She is alert and oriented to person, place, and time. No cranial nerve deficit. Coordination normal.   Skin: Skin is warm and dry. No ecchymosis, no petechiae and no rash noted. No erythema.   Psychiatric: She has a normal mood and affect.     " Mammo 6/12/2017   Impression:  No mammographic evidence of malignancy.     BI-RADS Category 1: Negative  Mammo 6/12/2017   Impression:  No mammographic evidence of malignancy.     BI-RADS Category 1: Negative      CT chest w/contrast   1. Large right hilar mass with involvement of adjacent segmental pulmonary arterial branches and bronchi with associated postobstructive atelectasis and volume loss in the right middle lobe with adjacent ground glass opacity.  Findings highly concerning for underlying neoplastic process.    2. Mediastinal and axillary adenopathy, with a right axillary lymph node measuring up to 2.0 cm in short axis diameter.    3. No definite evidence of pulmonary thromboembolism.      CT a/p w/contrast  1. No acute abnormality identified within the abdomen and pelvis.    2.  There are a few nonspecific prominent lymph nodes in the upper abdomen including a periesophageal lymph node which measures 1.0 cm in short axis diameter.    3.  Significant abdominal aortic atherosclerosis and abdominal aorta ectasia.    4.  Additional findings as above.    Assessment:       1. History of breast cancer    2. Lung mass    3. Abnormal CT scan of lung        Plan:   ECOG 1  1-3 Pt with hx of Stage 1A invasive ductal carcinoma rt breast s/p rt segmental mastectomy and SLN bx 7/11/2014 ER/MT neg HER 2 jose maria neg [id number](i-).  S/p adjuvant RT completed 9/2014  Pt declined adjuvant chemo  Follow-up Mammo 6/12/2017 No mammographic evidence of malignancy.  Pt  hospitalized 11/2017 with acute-on-chronic  resp failure  Abnormal CT imaging revealing Large right hilar mass with involvement of  adjacent segmental pulmonary arterial branches and bronchi with associated postobstructive atelectasis  and involvement of mediastinal and axillary adenopathy   S/p rt axillary LN bx ( outside facility) - inconclusive   Lung bx planned this week- if histologic confirmation of lung primary, plan mutational analysis testing  Plan PET/CT  imaging  Follow-up 2 wks    Greater than 25 minutes spent during this visit of which greater than 50% devoted to counseling and coordination of care regarding diagnosis and management plan.    Cc: Swetha Katz M.D.

## 2018-01-26 ENCOUNTER — HOSPITAL ENCOUNTER (OUTPATIENT)
Dept: RADIOLOGY | Facility: HOSPITAL | Age: 73
Discharge: HOME | End: 2018-01-26
Attending: INTERNAL MEDICINE
Payer: MEDICARE

## 2018-01-26 DIAGNOSIS — R91.8 LUNG MASS: ICD-10-CM

## 2018-01-26 DIAGNOSIS — R91.8 ABNORMAL CT SCAN OF LUNG: ICD-10-CM

## 2018-01-26 DIAGNOSIS — Z85.3 HISTORY OF BREAST CANCER: ICD-10-CM

## 2018-01-26 PROCEDURE — 78815 PET IMAGE W/CT SKULL-THIGH: CPT | Mod: TC,PI

## 2018-01-26 PROCEDURE — A9552 F18 FDG: HCPCS

## 2018-01-26 PROCEDURE — 78815 PET IMAGE W/CT SKULL-THIGH: CPT | Mod: 26,PS,, | Performed by: RADIOLOGY

## 2018-02-05 ENCOUNTER — PES CALL (OUTPATIENT)
Dept: ADMINISTRATIVE | Facility: CLINIC | Age: 73
End: 2018-02-05

## 2018-02-06 ENCOUNTER — TELEPHONE (OUTPATIENT)
Dept: INTERVENTIONAL RADIOLOGY/VASCULAR | Facility: HOSPITAL | Age: 73
End: 2018-02-06

## 2018-02-06 ENCOUNTER — OFFICE VISIT (OUTPATIENT)
Dept: HEMATOLOGY/ONCOLOGY | Facility: CLINIC | Age: 73
End: 2018-02-06
Payer: MEDICARE

## 2018-02-06 VITALS
DIASTOLIC BLOOD PRESSURE: 68 MMHG | WEIGHT: 173.94 LBS | BODY MASS INDEX: 31.81 KG/M2 | HEART RATE: 57 BPM | TEMPERATURE: 98 F | OXYGEN SATURATION: 95 % | SYSTOLIC BLOOD PRESSURE: 108 MMHG

## 2018-02-06 DIAGNOSIS — R91.8 LUNG MASS: ICD-10-CM

## 2018-02-06 DIAGNOSIS — Z85.41 HISTORY OF CERVICAL CANCER: ICD-10-CM

## 2018-02-06 DIAGNOSIS — Z85.3 HISTORY OF BREAST CANCER: ICD-10-CM

## 2018-02-06 DIAGNOSIS — R94.8 ABNORMAL POSITRON EMISSION TOMOGRAPHY (PET) SCAN: ICD-10-CM

## 2018-02-06 DIAGNOSIS — N64.59 BREAST THICKENING: ICD-10-CM

## 2018-02-06 DIAGNOSIS — J44.9 CHRONIC OBSTRUCTIVE PULMONARY DISEASE, UNSPECIFIED COPD TYPE: ICD-10-CM

## 2018-02-06 DIAGNOSIS — C80.1 CARCINOMA OF UNKNOWN PRIMARY: Primary | ICD-10-CM

## 2018-02-06 PROCEDURE — 1126F AMNT PAIN NOTED NONE PRSNT: CPT | Mod: ,,, | Performed by: INTERNAL MEDICINE

## 2018-02-06 PROCEDURE — 3008F BODY MASS INDEX DOCD: CPT | Mod: ,,, | Performed by: INTERNAL MEDICINE

## 2018-02-06 PROCEDURE — 1159F MED LIST DOCD IN RCRD: CPT | Mod: ,,, | Performed by: INTERNAL MEDICINE

## 2018-02-06 PROCEDURE — 99999 PR PBB SHADOW E&M-EST. PATIENT-LVL V: CPT | Mod: PBBFAC,,, | Performed by: INTERNAL MEDICINE

## 2018-02-06 PROCEDURE — 99215 OFFICE O/P EST HI 40 MIN: CPT | Mod: S$GLB,,, | Performed by: INTERNAL MEDICINE

## 2018-02-06 PROCEDURE — 99499 UNLISTED E&M SERVICE: CPT | Mod: S$GLB,,, | Performed by: INTERNAL MEDICINE

## 2018-02-06 NOTE — PROGRESS NOTES
Subjective:       Patient ID: Ade Castellano is a 72 y.o. female.    Chief Complaint: Follow-up    HPI     PT recently re established care      Diagnosis: Breast CA  HPI 73 y/o female seen today for f/u  History of Stage 1A breast cancer Grade 3, ER/MD neg ,   Her 2 jose maria neg s/p lumpectomy 7/11/2014 and s/p adjuvant RT 9/2014 here for f/u . Pt declined   Adjuvant chemo.    Previously ost to follow-up     Remote hx of cervical CA 2004 - s/p cone bx        Pt hospitalized 11/2017   Ms. Castellano was admitted for acute on chronic respiratory failure requiring intubation and ventilation. Has large lung mass in right lung with probable post obstructive pneumonia resulting in COPD exacerbation. But also, patient had ran out of home O2 prior to onset of symptoms, likely contributing to presentation. Initiated on broad spectrum abx, IV steroids, duo-nebs and pulmonology consulted for ventilator co-management. Successfully extubated to BiPAP on 11/30. Then de-escalated to low flow nasal cannula. Abx tailored to augmentin for broad coverage, including anaerobic bacteria /     CTA chest 11/29/2017 reveals   Large right hilar mass with involvement of adjacent segmental pulmonary arterial branches and bronchi with associated postobstructive atelectasis and volume loss in the right middle lobe with adjacent ground glass opacity.  Findings highly concerning for underlying neoplastic process. Mediastinal and axillary adenopathy, with a right axillary lymph node measuring up to 2.0 cm in short axis diameter. No definite evidence of pulmonary thromboembolism.    She is followed by Pulm  She underwent rt axillary LN bx at outside facility- on 1/16/2018 at [facility]  Pathology revealed metastatic poorly differentiated carcinoma of unknown primary site  TTF-1 negative, napsin negative  , cytokeratin 7 positive, cytokeratin 20 negative, p63 negative, cytokeratin 5/6 focal dim positivity        She next underwent lung bx  at [facility] last week 1/31/2017   Pathology revealed benign lung tissue    Appetite somewhat diminished  Mild shortness of breath with exertion  No hemoptysis   Mild  fatigue   No cough  No hemoptysis  vision changes   Mild HA    Pt wearing O2 at night        PET/CT 1/26/2018   Intense FDG uptake within the known right infrahilar lung mass, compatible with malignancy.   Intensely hypermetabolic right axillary, retropectoral, and lower right cervical lymph nodes, compatible with metastases.  Abnormal FDG uptake along right breast skin thickening, new from prior chest CTA and mammogram.          Mammo 6/12/2017   Impression:  No mammographic evidence of malignancy.     BI-RADS Category 1: Negative            PrevHx:She had an abnormal mammogram 6/4/2014 which  Revealed a round solid mass 6mm in rt breast.  She then underwent U/S guided core bx of rt breast mass on 6/17/2014.  Pathology revealed infiltrating ductal carcinoma Grade 3 with tumor  Present in thin-walled spaces suggestive of lymphatic spaces. Hormone  Receptor status on tumor specimen revealed ER negative 0% ,  UT negative 0% and Her 2 jose maria negative.  She subsequently underwent rt segmental mastectomy and SLN bx  On 7/11/2014. Pathology of rt breast lumpectomy revealed invasive  Ductal carcinoma with micropapillary pattern ( Invasive micropapillary  Ca) with max tumor dimension 11.5mm with suggestion of tumor in   Thin walled spaces c/w lymphovascular involvement. Surgical  Margins free of tumor, grade 3, ER/UT neg , Her 2 jose maria neg   With [place] Lymph node negative for Neoplasm.   Pathologic staging [id number](i-)  Her mother was diagnosed with breast cancer in her 50's/  She has no family history of ovarian cancer.           Review of Systems   Constitutional: Positive for fatigue. Negative for appetite change, fever and unexpected weight change.   HENT: Negative for mouth sores.    Eyes: Negative for visual disturbance.   Respiratory: Negative for cough and  shortness of breath.    Cardiovascular: Negative for chest pain.   Gastrointestinal: Negative for abdominal pain and diarrhea.   Genitourinary: Negative for frequency.   Musculoskeletal: Negative for back pain.   Skin: Negative for rash.   Neurological: Negative for headaches.   Hematological: Negative for adenopathy.   Psychiatric/Behavioral: The patient is not nervous/anxious.        Objective:        Vitals:    02/06/18 1310 02/06/18 1314   BP: 108/68    BP Location: Left arm    Patient Position: Sitting    BP Method: Medium (Manual)    Pulse: (!) 58 (!) 57   Temp: 98 °F (36.7 °C)    TempSrc: Oral    SpO2: 95% 95%   Weight: 78.9 kg (173 lb 15.1 oz)          Physical Exam   Constitutional: She is oriented to person, place, and time. She appears well-developed and well-nourished.   HENT:   Head: Normocephalic.   Mouth/Throat: Oropharynx is clear and moist. No oropharyngeal exudate.   Eyes: Conjunctivae and lids are normal. Pupils are equal, round, and reactive to light. No scleral icterus.   Neck: Normal range of motion. Neck supple. No thyromegaly present.   Cardiovascular: Normal rate, regular rhythm and normal heart sounds.    No murmur heard.  Pulmonary/Chest: Breath sounds normal. She has no wheezes. She has no rales. Right breast exhibits no inverted nipple, no mass, no nipple discharge and no skin change. Left breast exhibits no inverted nipple, no mass, no nipple discharge and no skin change.   Abdominal: Soft. Bowel sounds are normal. She exhibits no distension and no mass. There is no hepatosplenomegaly. There is no tenderness. There is no rebound and no guarding.   Musculoskeletal: Normal range of motion. She exhibits no edema or tenderness.   Lymphadenopathy:     She has no cervical adenopathy.     She has no axillary adenopathy.        Right: No supraclavicular adenopathy present.        Left: No supraclavicular adenopathy present.   Neurological: She is alert and oriented to person, place, and time.  No cranial nerve deficit. Coordination normal.   Skin: Skin is warm and dry. No ecchymosis, no petechiae and no rash noted. No erythema.   Psychiatric: She has a normal mood and affect.     Mammo 6/12/2017   Impression:  No mammographic evidence of malignancy.     BI-RADS Category 1: Negative  Mammo 6/12/2017   Impression:  No mammographic evidence of malignancy.     BI-RADS Category 1: Negative      CTA chest w/contrast 11/28/2018  1. Large right hilar mass with involvement of adjacent segmental pulmonary arterial branches and bronchi with associated postobstructive atelectasis and volume loss in the right middle lobe with adjacent ground glass opacity.  Findings highly concerning for underlying neoplastic process.    2. Mediastinal and axillary adenopathy, with a right axillary lymph node measuring up to 2.0 cm in short axis diameter.    3. No definite evidence of pulmonary thromboembolism.      CT a/p w/contrast 11/29/2018   1. No acute abnormality identified within the abdomen and pelvis.    2.  There are a few nonspecific prominent lymph nodes in the upper abdomen including a periesophageal lymph node which measures 1.0 cm in short axis diameter.    3.  Significant abdominal aortic atherosclerosis and abdominal aorta ectasia.    4.  Additional findings as above.    PET/CT 1/26/2018   1.  Intense FDG uptake within the known right infrahilar lung mass, compatible with malignancy.  It is unclear if this represents primary lung malignancy or metastatic breast cancer.    2.  Intensely hypermetabolic right axillary, retropectoral, and lower right cervical lymph nodes, compatible with metastases.    3.  Abnormal FDG uptake along right breast skin thickening, new from prior chest CTA and mammogram.  This may relate to localized edema/inflammation, though correlation with physical exam and mammography are recommended to exclude underlying inflammatory carcinoma.      This report has been flagged in the [facility]  record    Rt axillary LN bx at outside facility- on 1/16/2018 at [facility]  Pathology revealed metastatic poorly differentiated carcinoma of unknown primary  site 1/16/2018 at [facility]  TTF-1 negative, napsin negative  , cytokeratin 7 positive, cytokeratin 20 negative, p63 negative, cytokeratin 5/6 focal dim positivity    LUNG BIOPSY 1/31/2017   Pathology revealed benign lung tissue   Assessment:       1. Carcinoma of unknown primary    2. Lung mass    3. Abnormal positron emission tomography (PET) scan    4. History of breast cancer s/p lumpectomy and RT 2015     5. Chronic obstructive pulmonary disease, unspecified COPD type    6. History of cervical cancer 2004     7. Breast thickening, Rt         Plan:   ECOG 1  1-7 Pt with hx of Stage 1A invasive ductal carcinoma rt breast s/p rt segmental mastectomy and SLN bx 7/11/2014 ER/[place] neg HER 2 jose maria neg [id number](i-).  S/p adjuvant RT completed 9/2014  Pt declined adjuvant chemo  Follow-up Mammo 6/12/2017 No mammographic evidence of malignancy.  Pt  hospitalized 11/2017 with acute-on-chronic  resp failure  Abnormal CT imaging revealing Large right hilar mass with involvement of  adjacent segmental pulmonary arterial branches and bronchi with associated postobstructive atelectasis  and involvement of mediastinal and axillary adenopathy   S/p rt axillary LN bx ( outside facility) - metastatic poorly differentiated carcinoma of unknown primary  site  status post recent lung biopsy-benign lung tissue  PET/CT 1/26/2018   Intense FDG uptake within the known right infrahilar lung mass, compatible with malignancy.   Intensely hypermetabolic right axillary, retropectoral, and lower right cervical lymph nodes, compatible with metastases.  Abnormal FDG uptake along right breast skin thickening, new from prior chest CTA and mammogram.  Lung bx planned this week- if histologic confirmation of lung primary, plan mutational analysis testing    It is   recommended that she undergo repeat lung biopsy for further evaluation as the primary site remains  Unknown.  Consider CANCER Type ID testing   She elects for further biopsies to be performed at this facility  Plan follow-up Diagnostic  bilateral mammogram  - no breast mass noticed on physical exam, of mild skin thickening right breast  Plan MRI brain     D/W Dr. Gamboa ( [facility])  D/w Dr. Katz    Follow up in 2 weeks    Greater than 45 minutes spent during this visit of which greater than 50% devoted to counseling and coordination of care regarding diagnosis and management plan.    Cc: LUNA Gomez MD    Cc: Swetha Katz M.D.

## 2018-02-09 ENCOUNTER — TELEPHONE (OUTPATIENT)
Dept: HEMATOLOGY/ONCOLOGY | Facility: CLINIC | Age: 73
End: 2018-02-09

## 2018-02-09 ENCOUNTER — HOSPITAL ENCOUNTER (OUTPATIENT)
Dept: RADIOLOGY | Facility: HOSPITAL | Age: 73
Discharge: HOME | End: 2018-02-09
Attending: INTERNAL MEDICINE
Payer: MEDICARE

## 2018-02-09 ENCOUNTER — HOSPITAL ENCOUNTER (OUTPATIENT)
Dept: PREADMISSION TESTING | Facility: HOSPITAL | Age: 73
Discharge: HOME | End: 2018-02-09
Attending: RADIOLOGY
Payer: MEDICARE

## 2018-02-09 VITALS — BODY MASS INDEX: 32.01 KG/M2 | HEIGHT: 62 IN | WEIGHT: 173.94 LBS

## 2018-02-09 VITALS
RESPIRATION RATE: 18 BRPM | OXYGEN SATURATION: 97 % | TEMPERATURE: 97 F | HEART RATE: 51 BPM | SYSTOLIC BLOOD PRESSURE: 110 MMHG | HEIGHT: 62 IN | DIASTOLIC BLOOD PRESSURE: 75 MMHG | BODY MASS INDEX: 31.83 KG/M2 | WEIGHT: 173 LBS

## 2018-02-09 DIAGNOSIS — R91.8 LUNG MASS: Primary | ICD-10-CM

## 2018-02-09 DIAGNOSIS — R91.8 LUNG MASS: ICD-10-CM

## 2018-02-09 DIAGNOSIS — R94.8 ABNORMAL POSITRON EMISSION TOMOGRAPHY (PET) SCAN: ICD-10-CM

## 2018-02-09 DIAGNOSIS — C80.1 CARCINOMA OF UNKNOWN PRIMARY: ICD-10-CM

## 2018-02-09 DIAGNOSIS — Z85.3 HISTORY OF BREAST CANCER: ICD-10-CM

## 2018-02-09 DIAGNOSIS — N64.59 BREAST THICKENING: ICD-10-CM

## 2018-02-09 DIAGNOSIS — N64.89 OTHER SPECIFIED DISORDERS OF BREAST: ICD-10-CM

## 2018-02-09 LAB
INR PPP: 1
PROTHROMBIN TIME: 10.4 SEC

## 2018-02-09 PROCEDURE — 76641 ULTRASOUND BREAST COMPLETE: CPT | Mod: TC,RT

## 2018-02-09 PROCEDURE — 70553 MRI BRAIN STEM W/O & W/DYE: CPT | Mod: TC

## 2018-02-09 PROCEDURE — 77065 DX MAMMO INCL CAD UNI: CPT | Mod: TC

## 2018-02-09 PROCEDURE — 77061 BREAST TOMOSYNTHESIS UNI: CPT | Mod: 26,,, | Performed by: RADIOLOGY

## 2018-02-09 PROCEDURE — 77061 BREAST TOMOSYNTHESIS UNI: CPT | Mod: TC

## 2018-02-09 PROCEDURE — 70553 MRI BRAIN STEM W/O & W/DYE: CPT | Mod: 26,,, | Performed by: RADIOLOGY

## 2018-02-09 PROCEDURE — 85610 PROTHROMBIN TIME: CPT

## 2018-02-09 PROCEDURE — 76641 ULTRASOUND BREAST COMPLETE: CPT | Mod: 26,RT,, | Performed by: RADIOLOGY

## 2018-02-09 PROCEDURE — 36415 COLL VENOUS BLD VENIPUNCTURE: CPT

## 2018-02-09 PROCEDURE — A9585 GADOBUTROL INJECTION: HCPCS | Performed by: INTERNAL MEDICINE

## 2018-02-09 PROCEDURE — 77065 DX MAMMO INCL CAD UNI: CPT | Mod: 26,,, | Performed by: RADIOLOGY

## 2018-02-09 PROCEDURE — 25500020 PHARM REV CODE 255: Performed by: INTERNAL MEDICINE

## 2018-02-09 RX ORDER — GADOBUTROL 604.72 MG/ML
8 INJECTION INTRAVENOUS
Status: COMPLETED | OUTPATIENT
Start: 2018-02-09 | End: 2018-02-09

## 2018-02-09 RX ADMIN — GADOBUTROL 8 ML: 604.72 INJECTION INTRAVENOUS at 11:02

## 2018-02-09 NOTE — TELEPHONE ENCOUNTER
----- Message from Mildred Holliday MD sent at 2/9/2018  2:58 PM CST -----  Notify pt of findings  Refer to Breast surgeon     Also, f/u on lung bx

## 2018-02-09 NOTE — DISCHARGE INSTRUCTIONS
Your procedure is scheduled for_____2/14/2018____________.      Report to SAME DAY SURGERY UNIT at ___8:00____am on the 2nd floor of the hospital.  Use the front entrance of the hospital before 6 am.  If you need wheelchair assistance, call [phone] from your cell phone,  or call 0 from the courtesy phone in the hospital lobby.    Important instructions:   Do not eat or drink after 12 midnight, including water.  It is okay to brush your teeth.  Do not have gum, candy or mints.     Take only these medications with a small swallow of water on the morning of your surgery.__inhalers, metoprolol, isosorbide,  eye drops, flonase___________         Please shower the night before and the morning of your surgery.       Use Hibiclens soap to your surgery site if instructed by your pre op nurse. .  Be sure to rinse off Hibiclens after it is on your skin for several minutes.  Do not use Hibiclens on your face or genitals.      Do not wear make- up, including mascara.     You may wear deodorant only.      Do not wear powder, body lotion or cologne.     Do not wear any jewelry or have any metal on your body.     Please bring any documents given to you by your doctor.     If you are going home on the same day of surgery, you must have arrangements for a ride home.  You will not be able to drive home if you were given anesthesia or sedation.     Stop taking Aspirin, Ibuprofen, Motrin and Aleve at least 7 days before your surgery. You may use Tylenol.     Stop taking fish oil and vitamin E for least 7 days before surgery.     Wear loose fitting clothes allowing for bandages.     Please leave money and valuables home.       You may bring your cell phone.     Call the doctor if fever or illness should occur before your surgery.    Call [phone] to contact us here at [facility] if needed.

## 2018-02-09 NOTE — TELEPHONE ENCOUNTER
Spoke with pt, informed her of the mammogram results & that we are waiting on an appt with the breast surgeon, , & I will call her with the results. She voiced understanding.

## 2018-02-12 ENCOUNTER — TELEPHONE (OUTPATIENT)
Dept: SURGERY | Facility: CLINIC | Age: 73
End: 2018-02-12

## 2018-02-12 NOTE — TELEPHONE ENCOUNTER
----- Message from Lilliana Ambrosio RN sent at 2/12/2018 12:32 PM CST -----  Regarding: Appt  Did I send you a message for this patient?  She needs an appt with Dr.Mackey DIEGO.  Lung cancer, abnormal mammogram.  [facility]

## 2018-02-12 NOTE — TELEPHONE ENCOUNTER
Called patient to discuss appointment with Dr. Christopher for breast exam. Patient and I discussed Dr. Christopher's availability, scheduled for 2/21 at 10am. Patient and I reviewed location of [facility], verbalized understanding to all information.

## 2018-02-14 ENCOUNTER — HOSPITAL ENCOUNTER (OUTPATIENT)
Facility: HOSPITAL | Age: 73
Discharge: HOME | End: 2018-02-14
Attending: RADIOLOGY | Admitting: RADIOLOGY
Payer: MEDICARE

## 2018-02-14 VITALS
DIASTOLIC BLOOD PRESSURE: 63 MMHG | WEIGHT: 173 LBS | HEART RATE: 52 BPM | TEMPERATURE: 98 F | BODY MASS INDEX: 31.83 KG/M2 | HEIGHT: 62 IN | RESPIRATION RATE: 16 BRPM | SYSTOLIC BLOOD PRESSURE: 109 MMHG | OXYGEN SATURATION: 93 %

## 2018-02-14 DIAGNOSIS — R91.8 LUNG MASS: ICD-10-CM

## 2018-02-14 PROCEDURE — 88313 SPECIAL STAINS GROUP 2: CPT | Mod: 26,,, | Performed by: PATHOLOGY

## 2018-02-14 PROCEDURE — 88305 TISSUE EXAM BY PATHOLOGIST: CPT | Performed by: PATHOLOGY

## 2018-02-14 PROCEDURE — 63600175 PHARM REV CODE 636 W HCPCS: Performed by: RADIOLOGY

## 2018-02-14 PROCEDURE — 88342 IMHCHEM/IMCYTCHM 1ST ANTB: CPT | Mod: 26,,, | Performed by: PATHOLOGY

## 2018-02-14 PROCEDURE — 88305 TISSUE EXAM BY PATHOLOGIST: CPT | Mod: 26,,, | Performed by: PATHOLOGY

## 2018-02-14 PROCEDURE — 88341 IMHCHEM/IMCYTCHM EA ADD ANTB: CPT | Mod: 26,,, | Performed by: PATHOLOGY

## 2018-02-14 RX ORDER — FENTANYL CITRATE 50 UG/ML
INJECTION, SOLUTION INTRAMUSCULAR; INTRAVENOUS CODE/TRAUMA/SEDATION MEDICATION
Status: COMPLETED | OUTPATIENT
Start: 2018-02-14 | End: 2018-02-14

## 2018-02-14 RX ORDER — SODIUM CHLORIDE 9 MG/ML
INJECTION, SOLUTION INTRAVENOUS CONTINUOUS
Status: DISCONTINUED | OUTPATIENT
Start: 2018-02-14 | End: 2018-02-14 | Stop reason: HOSPADM

## 2018-02-14 RX ORDER — MIDAZOLAM HYDROCHLORIDE 1 MG/ML
INJECTION INTRAMUSCULAR; INTRAVENOUS CODE/TRAUMA/SEDATION MEDICATION
Status: COMPLETED | OUTPATIENT
Start: 2018-02-14 | End: 2018-02-14

## 2018-02-14 RX ADMIN — MIDAZOLAM HYDROCHLORIDE 1 MG: 1 INJECTION, SOLUTION INTRAMUSCULAR; INTRAVENOUS at 10:02

## 2018-02-14 RX ADMIN — FENTANYL CITRATE 50 MCG: 50 INJECTION INTRAMUSCULAR; INTRAVENOUS at 10:02

## 2018-02-14 RX ADMIN — FENTANYL CITRATE 25 MCG: 50 INJECTION INTRAMUSCULAR; INTRAVENOUS at 10:02

## 2018-02-14 RX ADMIN — MIDAZOLAM HYDROCHLORIDE 0.5 MG: 1 INJECTION, SOLUTION INTRAMUSCULAR; INTRAVENOUS at 10:02

## 2018-02-14 NOTE — SEDATION DOCUMENTATION
Anesthesia Plan-Radiology    Anesthesia Plan: Moderate Sedation     Pre-Anesthesia Assessment: Calm, Awake    ASA Status: Mild Disease    Airway: Dentition, Mouth Opening, Visualization of uvula and Neck range of motion     PREINDUCTION ASSESSMENT: UNCHANGED    Procedure: lung biopsy    Pre-Procedure Diagnosis: lung mass    Intra-Procedure Meds:Versed and Fentanyl

## 2018-02-14 NOTE — INTERVAL H&P NOTE
The patient has been examined and the H&P has been reviewed:    I concur with the findings and no changes have occurred since H&P was written.    Anesthesia/Surgery risks, benefits and alternative options discussed and understood by patient/family.          Active Hospital Problems    Diagnosis  POA    Lung mass [R91.8]  Yes      Resolved Hospital Problems    Diagnosis Date Resolved POA   No resolved problems to display.

## 2018-02-14 NOTE — OP NOTE
Radiology Post-Procedure Note    Pre Op Diagnosis: Right lung mass    Post Op Diagnosis: Right lung mass    Procedure: right lung biopsy    Procedure performed by: Marky Perez MD    Written Informed Consent Obtained: Yes    Specimen Removed: YES 3 18g core    Estimated Blood Loss: Minimal    Findings:   Using CT guidance a 17g sheath needle was placed into a Right lung mass. 18g monopty biopsy gun used to take 3 core biopsy samples. Specimen sent to pathology.     Additional specimen sent to lab: No    Patient tolerated procedure well.    Marky Perez MD  Staff Interventional Radiologist  Department of Radiology

## 2018-02-14 NOTE — DISCHARGE SUMMARY
[facility]  Discharge Note  Short Stay    Admit Date: 2/14/2018    Discharge Date and Time: No discharge date for patient encounter.     Attending Physician: Marky Perez MD     Discharge Provider: Marky Perez    Diagnoses:  Active Hospital Problems    Diagnosis  POA    Lung mass [R91.8]  Yes      Resolved Hospital Problems    Diagnosis Date Resolved POA   No resolved problems to display.       Discharged Condition: good    Hospital Course: Patient was admitted for an outpatient procedure and tolerated the procedure well with no complications.    Final Diagnoses: Same as principal problem.    Disposition: Home or Self Care    Follow up/Patient Instructions:    Medications:  Reconciled Home Medications:   Current Discharge Medication List      CONTINUE these medications which have NOT CHANGED    Details   albuterol (PROVENTIL) 2.5 mg /3 mL (0.083 %) nebulizer solution NEBULIZE 1 vial EVERY 6 HOURS AS NEEDED FOR WHEEZING  Qty: 540 mL, Refills: 1      albuterol (VENTOLIN HFA) 90 mcg/actuation inhaler INHALE 2 PUFF(S) BY MOUTH EVERY 4 - 6 HOURS AS NEEDED FOR SHORTNESS OF BREATH or WHEEZING  Qty: 18 g, Refills: 5      isosorbide mononitrate (IMDUR) 60 MG 24 hr tablet Take 30 mg by mouth once daily.   Refills: 0      metoprolol tartrate (LOPRESSOR) 25 MG tablet Take 25 mg by mouth 2 (two) times daily.       rosuvastatin (CRESTOR) 20 MG tablet Take 1 tablet (20 mg total) by mouth once daily.  Qty: 90 tablet, Refills: 1      ascorbic acid (VITAMIN C) 500 MG tablet Take 500 mg by mouth once daily.      aspirin (ECOTRIN) 325 MG EC tablet Take 325 mg by mouth once daily.      epinastine 0.05 % ophthalmic solution PLACE 1 DROP(S) IN BOTH EYES TWICE DAILY  Refills: 2      fluticasone (FLONASE) 50 mcg/actuation nasal spray USE TWO SPRAYS IN EACH NOSTRIL ONCE A DAY  Refills: 6      NITROSTAT 0.4 mg SL tablet place 1 tablet under the tongue AS NEEDED no more than 3 in 15 minutes  Qty: 25 tablet, Refills: 0      SPIRIVA  RESPIMAT 2.5 mcg/actuation Mist Inhale 1 puff into the lungs once daily.           No discharge procedures on file.      Discharge Procedure Orders (must include Diet, Follow-up, Activity):  1. Resume previous diet  2. No heavy lifting for 72 hour  2. Go to ER for chest pain, difficulty breathing or hemoptysis

## 2018-02-14 NOTE — DISCHARGE INSTRUCTIONS
CT-Guided Lung Biopsy  CT-guided lung biopsy is a procedure to collect small tissue samples from an abnormal area in your lung. During the procedure, an imaging method called CT (computed tomography) is used to show live pictures of your lung. Then a thin needle is used to remove the tissue samples. The samples are tested in a lab for cancer and other problems.     For the biopsy, a thin needle is used to remove samples of tissue from an abnormal area in the lung.   Getting ready for your procedure  Follow any instructions from your healthcare provider.  Tell your provider about any medicines you are taking. It is important for your provider to know if you are taking any blood-thinning medicines or have a bleeding disorder.  You may need to stop taking all or some medicine before the procedure. This includes:  · All prescription medicines  · Blood-thinning medicines (anticoagulants)  · Over-the-counter medicines such as aspirin or ibuprofen  · Street drugs  · Herbs, vitamins, and other supplements  Also tell your provider if you:  · Are pregnant or think you may be pregnant  · Are breastfeeding  · Are allergic to or have intolerances to any medicines  · Have a chronic cough, new cough, or other illness  · Use oxygen therapy at home  · Smoke or drink alcohol on a regular basis  Follow any directions youre given for not eating or drinking before the procedure.  The day of your procedure  The procedure takes about 60 minutes. The entire procedure (including time to prepare and recover) takes several hours. Youll likely go home the same day.  Before the procedure begins:  · An IV (intravenous) line may be put into a vein in your hand or arm. This line supplies fluids and medicines.  · To keep you free of pain during the procedure, you may be given anesthesia. Depending on the type of anesthesia used, you may be awake, drowsy, or in a deep sleep for the procedure.  During the procedure:  · Youll lie on a CT scan  table. You may be on your back, side, or stomach. Pictures of your lung are then taken using the CT scanner. This helps your provider find the best place to position the needle in your lungs.  · A suman is made on your skin where the needle will be inserted (biopsy site). The site is injected with numbing medicine.  · Using the CT pictures as a guide, the needle is passed through the numbed skin between your ribs and into your lung. Samples of tissue are then removed from the abnormal area in your lung. The samples are sent to a lab to be checked for problems.  · When the procedure is complete, the needle is removed. Pressure is applied to the biopsy site to help stop any bleeding. The site is then bandaged.  After the procedure:  · Youll be taken to a room to rest until the anesthesia wears off.  · A chest X-ray may be done. This is to make sure there was no damage to your lungs or the area where the needle was placed.  · When its time for you to go home, have an adult family member or friend ready to drive you.  Recovering at home  You may cough up a small amount of blood shortly after the procedure. Later, you may also have some soreness around the biopsy site. Once at home, follow any instructions youre given. Be sure to:  · Take all medicines as directed.  · Care for the biopsy site as instructed.  · Check for signs of infection at the biopsy site (see below).  · Do not bathe or shower until your provider says it's OK. If you wish, you may wash with a sponge or washcloth.  · Avoid heavy lifting and other strenuous activities as directed.  · If you plan any air travel, ask your provider when you can do so. You may be told not to fly for a few weeks. This is because pressure changes may affect your lungs.  When to call your provider  Call 911 or your local emergency number if you have sudden, severe difficulty breathing. This could be a life-threatening emergency.  Call your healthcare provider for less severe  symptoms that are still concerning. If you are unable to speak with your provider, go to the emergency room if you have any of the following symptoms:  · Fever of 100.4°F (38°C) or higher, or as directed by your provider  · Sudden chest pain, shortness of breath, or fainting  · Coughing up increasing amounts of blood  · Signs of infection at the biopsy site, such as increased redness or swelling, warmth, more pain, bleeding, or bad-smelling drainage   Follow-up  The provider who ordered your test will discuss the biopsy results with you during a follow-up visit. Results are usually ready within 1 to 2 weeks. If more tests or treatments are needed, your provider will discuss these with you.  Risks and possible complications  All procedures have some risk. Possible risks of this procedure include:  · An air leak in your lung (pneumothorax). This may require a stay in the hospital and treatment to re-inflate the lung.  · Bleeding into or around the lung  · Infection in the skin or lung  · Injury to other structures in the chest  · Risks of anesthesia. This will be discussed with you before the procedure.   Date Last Reviewed: 6/11/2015  © 0961-9944 [employer]. [address] [place] [address]. All rights reserved. This information is not intended as a substitute for professional medical care. Always follow your healthcare professional's instructions.      BATHING:  ? You may shower tomorrow.  DRESSING:  ? Remove dressing tomorrow.        ACTIVITY LEVEL: If you have received sedation or an anesthetic, you may feel sleepy for several hours. Rest until you are more awake. Gradually resume your normal activities    Do not drive, drink alcohol, or sign legal documents for 24 hours, or if taking narcotic pain medication.      DIET: You may resume your home diet. If nausea is present, increase your diet gradually with fluids and bland foods.    Medications: Pain medication should be taken only if  needed and as directed. If antibiotics are prescribed, the medication should be taken until completed. You will be given an updated list of you medications.  ? No driving, alcoholic beverages or signing legal documents for next 24 hours if you have had sedation, or while taking pain medication    CALL THE DOCTOR:   For any obvious bleeding (some dried blood over the incision is normal).     Redness, swelling, foul smell around incision or fever over 101.  Shortness of breath.  Persistent pain or nausea not relieved by medication.  Call  [phone]     to speak with an Interventional Radiologist    If any unusual problems or difficulties occur contact your doctor. If you cannot contact your doctor but feel your signs and symptoms warrant a physicians attention return to the emergency room.       Fall Prevention  Millions of people fall every year and injure themselves. You may have had anesthesia or sedation which may increase your risk of falling. You may have health issues that put you at an increased risk of falling.     Here are ways to reduce your risk of falling.  ·   · Make your home safe by keeping walkways clear of objects you may trip over.  · Use non-slip pads under rugs. Do not use area rugs or small throw rugs.  · Use non-slip mats in bathtubs and showers.  · Install handrails and lights on staircases.  · Do not walk in poorly lit areas.  · Do not stand on chairs or wobbly ladders.  · Use caution when reaching overhead or looking upward. This position can cause a loss of balance.  · Be sure your shoes fit properly, have non-slip bottoms and are in good condition.   · Wear shoes both inside and out. Avoid going barefoot or wearing slippers.  · Be cautious when going up and down stairs, curbs, and when walking on uneven sidewalks.  · If your balance is poor, consider using a cane or walker.  · If your fall was related to alcohol use, stop or limit alcohol intake.   · If your fall was related to use of  sleeping medicines, talk to your doctor about this. You may need to reduce your dosage at bedtime if you awaken during the night to go to the bathroom.    · To reduce the need for nighttime bathroom trips:  ¨ Avoid drinking fluids for several hours before going to bed  ¨ Empty your bladder before going to bed  ¨ Men can keep a urinal at the bedside  · Stay as active as you can. Balance, flexibility, strength, and endurance all come from exercise. They all play a role in preventing falls. Ask your healthcare provider which types of activity are right for you.  · Get your vision checked on a regular basis.  · If you have pets, know where they are before you stand up or walk so you don't trip over them.  Use night lights.

## 2018-02-14 NOTE — H&P (VIEW-ONLY)
Subjective:       Patient ID: Ade Castellano is a 72 y.o. female.    Chief Complaint: Follow-up    HPI     PT recently re established care      Diagnosis: Breast CA  HPI 73 y/o female seen today for f/u  History of Stage 1A breast cancer Grade 3, ER/[place] neg ,   Her 2 jose maria neg s/p lumpectomy 7/11/2014 and s/p adjuvant RT 9/2014 here for f/u . Pt declined   Adjuvant chemo.    Previously ost to follow-up     Remote hx of cervical CA 2004 - s/p cone bx        Pt hospitalized 11/2017   Ms. Castellano was admitted for acute on chronic respiratory failure requiring intubation and ventilation. Has large lung mass in right lung with probable post obstructive pneumonia resulting in COPD exacerbation. But also, patient had ran out of home O2 prior to onset of symptoms, likely contributing to presentation. Initiated on broad spectrum abx, IV steroids, duo-nebs and pulmonology consulted for ventilator co-management. Successfully extubated to BiPAP on 11/30. Then de-escalated to low flow nasal cannula. Abx tailored to augmentin for broad coverage, including anaerobic bacteria /     CTA chest 11/29/2017 reveals   Large right hilar mass with involvement of adjacent segmental pulmonary arterial branches and bronchi with associated postobstructive atelectasis and volume loss in the right middle lobe with adjacent ground glass opacity.  Findings highly concerning for underlying neoplastic process. Mediastinal and axillary adenopathy, with a right axillary lymph node measuring up to 2.0 cm in short axis diameter. No definite evidence of pulmonary thromboembolism.    She is followed by Pulm  She underwent rt axillary LN bx at outside facility- on 1/16/2018 at [facility]  Pathology revealed metastatic poorly differentiated carcinoma of unknown primary site  TTF-1 negative, napsin negative  , cytokeratin 7 positive, cytokeratin 20 negative, p63 negative, cytokeratin 5/6 focal dim positivity        She next underwent lung bx  at [facility] last week 1/31/2017   Pathology revealed benign lung tissue    Appetite somewhat diminished  Mild shortness of breath with exertion  No hemoptysis   Mild  fatigue   No cough  No hemoptysis  vision changes   Mild HA    Pt wearing O2 at night        PET/CT 1/26/2018   Intense FDG uptake within the known right infrahilar lung mass, compatible with malignancy.   Intensely hypermetabolic right axillary, retropectoral, and lower right cervical lymph nodes, compatible with metastases.  Abnormal FDG uptake along right breast skin thickening, new from prior chest CTA and mammogram.          Mammo 6/12/2017   Impression:  No mammographic evidence of malignancy.     BI-RADS Category 1: Negative            PrevHx:She had an abnormal mammogram 6/4/2014 which  Revealed a round solid mass 6mm in rt breast.  She then underwent U/S guided core bx of rt breast mass on 6/17/2014.  Pathology revealed infiltrating ductal carcinoma Grade 3 with tumor  Present in thin-walled spaces suggestive of lymphatic spaces. Hormone  Receptor status on tumor specimen revealed ER negative 0% ,  DC negative 0% and Her 2 jose maria negative.  She subsequently underwent rt segmental mastectomy and SLN bx  On 7/11/2014. Pathology of rt breast lumpectomy revealed invasive  Ductal carcinoma with micropapillary pattern ( Invasive micropapillary  Ca) with max tumor dimension 11.5mm with suggestion of tumor in   Thin walled spaces c/w lymphovascular involvement. Surgical  Margins free of tumor, grade 3, ER/[place] neg , Her 2 jose maria neg   With [place] Lymph node negative for Neoplasm.   Pathologic staging [id number](i-)  Her mother was diagnosed with breast cancer in her 50's/  She has no family history of ovarian cancer.           Review of Systems   Constitutional: Positive for fatigue. Negative for appetite change, fever and unexpected weight change.   HENT: Negative for mouth sores.    Eyes: Negative for visual disturbance.   Respiratory: Negative for cough and  shortness of breath.    Cardiovascular: Negative for chest pain.   Gastrointestinal: Negative for abdominal pain and diarrhea.   Genitourinary: Negative for frequency.   Musculoskeletal: Negative for back pain.   Skin: Negative for rash.   Neurological: Negative for headaches.   Hematological: Negative for adenopathy.   Psychiatric/Behavioral: The patient is not nervous/anxious.        Objective:        Vitals:    02/06/18 1310 02/06/18 1314   BP: 108/68    BP Location: Left arm    Patient Position: Sitting    BP Method: Medium (Manual)    Pulse: (!) 58 (!) 57   Temp: 98 °F (36.7 °C)    TempSrc: Oral    SpO2: 95% 95%   Weight: 78.9 kg (173 lb 15.1 oz)          Physical Exam   Constitutional: She is oriented to person, place, and time. She appears well-developed and well-nourished.   HENT:   Head: Normocephalic.   Mouth/Throat: Oropharynx is clear and moist. No oropharyngeal exudate.   Eyes: Conjunctivae and lids are normal. Pupils are equal, round, and reactive to light. No scleral icterus.   Neck: Normal range of motion. Neck supple. No thyromegaly present.   Cardiovascular: Normal rate, regular rhythm and normal heart sounds.    No murmur heard.  Pulmonary/Chest: Breath sounds normal. She has no wheezes. She has no rales. Right breast exhibits no inverted nipple, no mass, no nipple discharge and no skin change. Left breast exhibits no inverted nipple, no mass, no nipple discharge and no skin change.   Abdominal: Soft. Bowel sounds are normal. She exhibits no distension and no mass. There is no hepatosplenomegaly. There is no tenderness. There is no rebound and no guarding.   Musculoskeletal: Normal range of motion. She exhibits no edema or tenderness.   Lymphadenopathy:     She has no cervical adenopathy.     She has no axillary adenopathy.        Right: No supraclavicular adenopathy present.        Left: No supraclavicular adenopathy present.   Neurological: She is alert and oriented to person, place, and time.  No cranial nerve deficit. Coordination normal.   Skin: Skin is warm and dry. No ecchymosis, no petechiae and no rash noted. No erythema.   Psychiatric: She has a normal mood and affect.     Mammo 6/12/2017   Impression:  No mammographic evidence of malignancy.     BI-RADS Category 1: Negative  Mammo 6/12/2017   Impression:  No mammographic evidence of malignancy.     BI-RADS Category 1: Negative      CTA chest w/contrast 11/28/2018  1. Large right hilar mass with involvement of adjacent segmental pulmonary arterial branches and bronchi with associated postobstructive atelectasis and volume loss in the right middle lobe with adjacent ground glass opacity.  Findings highly concerning for underlying neoplastic process.    2. Mediastinal and axillary adenopathy, with a right axillary lymph node measuring up to 2.0 cm in short axis diameter.    3. No definite evidence of pulmonary thromboembolism.      CT a/p w/contrast 11/29/2018   1. No acute abnormality identified within the abdomen and pelvis.    2.  There are a few nonspecific prominent lymph nodes in the upper abdomen including a periesophageal lymph node which measures 1.0 cm in short axis diameter.    3.  Significant abdominal aortic atherosclerosis and abdominal aorta ectasia.    4.  Additional findings as above.    PET/CT 1/26/2018   1.  Intense FDG uptake within the known right infrahilar lung mass, compatible with malignancy.  It is unclear if this represents primary lung malignancy or metastatic breast cancer.    2.  Intensely hypermetabolic right axillary, retropectoral, and lower right cervical lymph nodes, compatible with metastases.    3.  Abnormal FDG uptake along right breast skin thickening, new from prior chest CTA and mammogram.  This may relate to localized edema/inflammation, though correlation with physical exam and mammography are recommended to exclude underlying inflammatory carcinoma.      This report has been flagged in the [email or web address]  record    Rt axillary LN bx at outside facility- on 1/16/2018 at [facility]  Pathology revealed metastatic poorly differentiated carcinoma of unknown primary  site 1/16/2018 at [facility]  TTF-1 negative, napsin negative  , cytokeratin 7 positive, cytokeratin 20 negative, p63 negative, cytokeratin 5/6 focal dim positivity    LUNG BIOPSY 1/31/2017   Pathology revealed benign lung tissue   Assessment:       1. Carcinoma of unknown primary    2. Lung mass    3. Abnormal positron emission tomography (PET) scan    4. History of breast cancer s/p lumpectomy and RT 2015     5. Chronic obstructive pulmonary disease, unspecified COPD type    6. History of cervical cancer 2004     7. Breast thickening, Rt         Plan:   ECOG 1  1-7 Pt with hx of Stage 1A invasive ductal carcinoma rt breast s/p rt segmental mastectomy and SLN bx 7/11/2014 ER/[place] neg HER 2 jose maria neg [id number](i-).  S/p adjuvant RT completed 9/2014  Pt declined adjuvant chemo  Follow-up Mammo 6/12/2017 No mammographic evidence of malignancy.  Pt  hospitalized 11/2017 with acute-on-chronic  resp failure  Abnormal CT imaging revealing Large right hilar mass with involvement of  adjacent segmental pulmonary arterial branches and bronchi with associated postobstructive atelectasis  and involvement of mediastinal and axillary adenopathy   S/p rt axillary LN bx ( outside facility) - metastatic poorly differentiated carcinoma of unknown primary  site  status post recent lung biopsy-benign lung tissue  PET/CT 1/26/2018   Intense FDG uptake within the known right infrahilar lung mass, compatible with malignancy.   Intensely hypermetabolic right axillary, retropectoral, and lower right cervical lymph nodes, compatible with metastases.  Abnormal FDG uptake along right breast skin thickening, new from prior chest CTA and mammogram.  Lung bx planned this week- if histologic confirmation of lung primary, plan mutational analysis testing    It is   recommended that she undergo repeat lung biopsy for further evaluation as the primary site remains  Unknown.  Consider CANCER Type ID testing   She elects for further biopsies to be performed at this facility  Plan follow-up Diagnostic  bilateral mammogram  - no breast mass noticed on physical exam, of mild skin thickening right breast  Plan MRI brain     D/W Dr. Gamboa ( [facility])  D/w Dr. Katz    Follow up in 2 weeks    Greater than 45 minutes spent during this visit of which greater than 50% devoted to counseling and coordination of care regarding diagnosis and management plan.    Cc: LUNA Gomez MD    Cc: Swetha Katz M.D.

## 2018-02-21 ENCOUNTER — OFFICE VISIT (OUTPATIENT)
Dept: SURGERY | Facility: CLINIC | Age: 73
End: 2018-02-21
Payer: MEDICARE

## 2018-02-21 VITALS
SYSTOLIC BLOOD PRESSURE: 111 MMHG | WEIGHT: 172.63 LBS | DIASTOLIC BLOOD PRESSURE: 70 MMHG | BODY MASS INDEX: 30.59 KG/M2 | TEMPERATURE: 98 F | HEIGHT: 63 IN

## 2018-02-21 DIAGNOSIS — R59.0 AXILLARY ADENOPATHY: Primary | ICD-10-CM

## 2018-02-21 PROCEDURE — 99999 PR PBB SHADOW E&M-EST. PATIENT-LVL II: CPT | Mod: PBBFAC,,, | Performed by: SURGERY

## 2018-02-21 PROCEDURE — 99205 OFFICE O/P NEW HI 60 MIN: CPT | Mod: S$GLB,,, | Performed by: SURGERY

## 2018-02-21 NOTE — LETTER
March 8, 2018      Mildred Holliday MD  [address]  [address]  [place] [address]           [facility] [place] - Breast Surgery  [address] [facility] [address]  [place] [address]  Phone: [phone]  Fax: [phone]          Patient: Ade Castellano   MR Number: [id number]   YOB: 1945   Date of Visit: 2/21/2018       Dear Dr. Mildred Holliday:    Thank you for referring Ade Castellano to me for evaluation. Attached you will find relevant portions of my assessment and plan of care.    If you have questions, please do not hesitate to call me. I look forward to following Ade Castellano along with you.    Sincerely,    Tory Christopher MD    Enclosure  CC:  No Recipients    If you would like to receive this communication electronically, please contact [email or web address] or [phone] to request more information on [employer] Link access.    For providers and/or their staff who would like to refer a patient to [facility], please contact us through our one-stop-shop provider referral line, [facility], at [phone].    If you feel you have received this communication in error or would no longer like to receive these types of communications, please e-mail [email or web address]

## 2018-02-27 ENCOUNTER — TUMOR BOARD CONFERENCE (OUTPATIENT)
Dept: SURGERY | Facility: CLINIC | Age: 73
End: 2018-02-27

## 2018-02-27 NOTE — PROGRESS NOTES
Interdisciplinary Breast Cancer Conference    Ade Castellano    Female    Date Presented to Tumor Board: 02/27/18    HOSPTIAL/CLINIC PRESENTING: [facility]    TUMOR SITE: RIGHT (previous right breast cancer, no new right breast mass but right axillary nodes seen)    Presenter: Samira Delatorre, Tawanda Bustos, Lisa Morales (on behalf of Tory Christopher MD)    Reason For Consultation: Initial Presentation    Specialties Present: Medical Oncology;Pathology;Surgical Oncology;Radiation Oncology;Navigation;Research;Radiology    Patient Status: a current patient    Treatment to Date: None   2014 triple negative breast cancer treated by lumpectomy and radiation, no chemotherapy.     Pneumonia diagnosis caused her to be admitted to the ICU and intubated, which lead to a scan that diagnosed a large lung mass on right side.     November 2017 she was improved enough to get full workup. PET in Dec found metastasis to multiple valerio areas, unknown if this represented metastatic breast or lung cancer      Clinical Trial Eligibility: None available    Estrogen Receptor Status: Negative (original breast diagnosis 2014)    Progesterone Status: Negative (original breast diagnosis 2014)    Her2/JAM Status: Negative (original breast diagnosis 2014)    Cancer Staging  Stage IV cancer: Breast vs. Lung vs. Both     RECOMMENDED PLAN: Chemotherapy   Requested slides for review to further evaluate node that was biopsied at [facility]    [place]bler based therapy would treat both diagnoses, if patient can tolerate it.     Single agent Taxol is an option, weekly carbo/taxol (more tolerable) with radiation could also be given, but discussion that radiation would not be very helpful and may cause complications to lung/reduce lung function more. If lung becomes more symptomatic (hemoptysis, etc.) then could radiate at a later time.     Lung mass is encasing the pulmonary vessel, so this is likely going to be the complication that kills her. Hopefully  systemic therapy can alleviate some of the symptoms and give patient additional time. Discussion with patient that this is not curable has already occurred, and patient appears to be handling it well.     Axillary area is relatively symptomatic: pain and lymphedema    UNSTAGEABLE: No         PRESENTATION AT CANCER CONFERENCE: [facility]

## 2018-03-01 DIAGNOSIS — C77.9 METASTASIS TO LYMPH NODE OF UNKNOWN PRIMARY: ICD-10-CM

## 2018-03-01 DIAGNOSIS — C80.1 METASTASIS TO LYMPH NODE OF UNKNOWN PRIMARY: ICD-10-CM

## 2018-03-01 DIAGNOSIS — Z85.3 HISTORY OF BREAST CANCER: ICD-10-CM

## 2018-03-01 DIAGNOSIS — R91.8 LUNG MASS: Primary | ICD-10-CM

## 2018-03-02 ENCOUNTER — DOCUMENTATION ONLY (OUTPATIENT)
Dept: HEMATOLOGY/ONCOLOGY | Facility: CLINIC | Age: 73
End: 2018-03-02

## 2018-03-02 ENCOUNTER — LAB VISIT (OUTPATIENT)
Dept: LAB | Facility: HOSPITAL | Age: 73
End: 2018-03-02
Attending: INTERNAL MEDICINE
Payer: MEDICARE

## 2018-03-02 ENCOUNTER — OFFICE VISIT (OUTPATIENT)
Dept: HEMATOLOGY/ONCOLOGY | Facility: CLINIC | Age: 73
End: 2018-03-02
Payer: MEDICARE

## 2018-03-02 VITALS
BODY MASS INDEX: 30.64 KG/M2 | WEIGHT: 172.94 LBS | HEIGHT: 63 IN | OXYGEN SATURATION: 95 % | TEMPERATURE: 98 F | SYSTOLIC BLOOD PRESSURE: 116 MMHG | HEART RATE: 45 BPM | DIASTOLIC BLOOD PRESSURE: 62 MMHG

## 2018-03-02 DIAGNOSIS — R11.0 CHEMOTHERAPY-INDUCED NAUSEA: ICD-10-CM

## 2018-03-02 DIAGNOSIS — G47.33 OSA (OBSTRUCTIVE SLEEP APNEA): Chronic | ICD-10-CM

## 2018-03-02 DIAGNOSIS — Z85.3 HISTORY OF BREAST CANCER: ICD-10-CM

## 2018-03-02 DIAGNOSIS — C34.90 SQUAMOUS CELL CARCINOMA OF LUNG, UNSPECIFIED LATERALITY: ICD-10-CM

## 2018-03-02 DIAGNOSIS — J44.9 CHRONIC OBSTRUCTIVE PULMONARY DISEASE, UNSPECIFIED COPD TYPE: ICD-10-CM

## 2018-03-02 DIAGNOSIS — Z51.11 ENCOUNTER FOR ANTINEOPLASTIC CHEMOTHERAPY: ICD-10-CM

## 2018-03-02 DIAGNOSIS — R59.0 AXILLARY LYMPHADENOPATHY: ICD-10-CM

## 2018-03-02 DIAGNOSIS — T45.1X5A CHEMOTHERAPY-INDUCED NAUSEA: ICD-10-CM

## 2018-03-02 DIAGNOSIS — C34.90 SQUAMOUS CELL CARCINOMA OF LUNG, UNSPECIFIED LATERALITY: Primary | ICD-10-CM

## 2018-03-02 DIAGNOSIS — C34.90 LUNG CANCER: Primary | ICD-10-CM

## 2018-03-02 LAB
ALBUMIN SERPL BCP-MCNC: 4 G/DL
ALP SERPL-CCNC: 46 U/L
ALT SERPL W/O P-5'-P-CCNC: 13 U/L
ANION GAP SERPL CALC-SCNC: 4 MMOL/L
AST SERPL-CCNC: 16 U/L
BASOPHILS # BLD AUTO: 0.1 K/UL
BASOPHILS NFR BLD: 1.5 %
BILIRUB SERPL-MCNC: 0.5 MG/DL
BUN SERPL-MCNC: 13 MG/DL
CALCIUM SERPL-MCNC: 9.7 MG/DL
CHLORIDE SERPL-SCNC: 103 MMOL/L
CO2 SERPL-SCNC: 31 MMOL/L
CREAT SERPL-MCNC: 0.8 MG/DL
DIFFERENTIAL METHOD: NORMAL
EOSINOPHIL # BLD AUTO: 0.4 K/UL
EOSINOPHIL NFR BLD: 5.1 %
ERYTHROCYTE [DISTWIDTH] IN BLOOD BY AUTOMATED COUNT: 13.8 %
EST. GFR  (AFRICAN AMERICAN): >60 ML/MIN/1.73 M^2
EST. GFR  (NON AFRICAN AMERICAN): >60 ML/MIN/1.73 M^2
GLUCOSE SERPL-MCNC: 116 MG/DL
HCT VFR BLD AUTO: 42.9 %
HGB BLD-MCNC: 14.1 G/DL
LYMPHOCYTES # BLD AUTO: 1.6 K/UL
LYMPHOCYTES NFR BLD: 23.6 %
MCH RBC QN AUTO: 29.6 PG
MCHC RBC AUTO-ENTMCNC: 32.9 G/DL
MCV RBC AUTO: 90 FL
MONOCYTES # BLD AUTO: 0.6 K/UL
MONOCYTES NFR BLD: 9.3 %
NEUTROPHILS # BLD AUTO: 4.1 K/UL
NEUTROPHILS NFR BLD: 60.4 %
PLATELET # BLD AUTO: 297 K/UL
PMV BLD AUTO: 9.5 FL
POTASSIUM SERPL-SCNC: 4.9 MMOL/L
PROT SERPL-MCNC: 7.5 G/DL
RBC # BLD AUTO: 4.77 M/UL
SODIUM SERPL-SCNC: 138 MMOL/L
WBC # BLD AUTO: 6.81 K/UL

## 2018-03-02 PROCEDURE — 99999 PR PBB SHADOW E&M-EST. PATIENT-LVL IV: CPT | Mod: PBBFAC,,, | Performed by: INTERNAL MEDICINE

## 2018-03-02 PROCEDURE — 36415 COLL VENOUS BLD VENIPUNCTURE: CPT

## 2018-03-02 PROCEDURE — 99499 UNLISTED E&M SERVICE: CPT | Mod: S$GLB,,, | Performed by: INTERNAL MEDICINE

## 2018-03-02 PROCEDURE — 85025 COMPLETE CBC W/AUTO DIFF WBC: CPT

## 2018-03-02 PROCEDURE — 99215 OFFICE O/P EST HI 40 MIN: CPT | Mod: S$GLB,,, | Performed by: INTERNAL MEDICINE

## 2018-03-02 PROCEDURE — 88321 CONSLTJ&REPRT SLD PREP ELSWR: CPT | Mod: ,,, | Performed by: PATHOLOGY

## 2018-03-02 PROCEDURE — 80053 COMPREHEN METABOLIC PANEL: CPT

## 2018-03-02 RX ORDER — DEXAMETHASONE 4 MG/1
20 TABLET ORAL SEE ADMIN INSTRUCTIONS
Qty: 60 TABLET | Refills: 0 | Status: SHIPPED | OUTPATIENT
Start: 2018-03-02 | End: 2018-04-23 | Stop reason: SDUPTHER

## 2018-03-02 RX ORDER — PROCHLORPERAZINE MALEATE 10 MG
10 TABLET ORAL EVERY 6 HOURS PRN
Qty: 30 TABLET | Refills: 1 | Status: SHIPPED | OUTPATIENT
Start: 2018-03-02 | End: 2018-08-23

## 2018-03-02 NOTE — Clinical Note
PAC PLACEMENT BEGIN CARBO/TAXOL DECADRON PRE-MED INFORMED CONSENT  CHEMO TEACHING CBC,CMP TODAY  BEGIN CHEMO FOLLOWING PAC PLACEMENT  F/U CANCER TYPE ID CBC,CMP PRIOR TO F/U

## 2018-03-02 NOTE — PRE ADMISSION SCREENING
RN Phone Pre Op done.  Patient instructed to remain NPO after midnight prior to surgery, except to take Metoprolol and Imdur as directed.  Expressed understanding of instructions.  Will arrive at 08:00 Am for procedure.

## 2018-03-02 NOTE — PROGRESS NOTES
Subjective:       Patient ID: Ade Castellano is a 72 y.o. female.    Chief Complaint: Follow-up    HPI   Diagnosis: Stage IV cancer: Breast vs. Lung vs. Both   HPI 71 y/o female seen today for f/u  History of Stage 1A breast cancer Grade 3, ER/CT neg ,   Her 2 jose maria neg s/p lumpectomy 7/11/2014 and s/p adjuvant RT 9/2014 here for f/u . Pt declined   Adjuvant chemo.    Previously lost to follow-up     Remote hx of cervical CA 2004 - s/p cone bx        Pt hospitalized 11/2017   Ms. Castellano was admitted for acute on chronic respiratory failure requiring intubation and ventilation. Has large lung mass in right lung with probable post obstructive pneumonia resulting in COPD exacerbation. But also, patient had ran out of home O2 prior to onset of symptoms, likely contributing to presentation. Initiated on broad spectrum abx, IV steroids, duo-nebs and pulmonology consulted for ventilator co-management. Successfully extubated to BiPAP on 11/30. Then de-escalated to low flow nasal cannula. Abx tailored to augmentin for broad coverage, including anaerobic bacteria /     CTA chest 11/29/2017 reveals   Large right hilar mass with involvement of adjacent segmental pulmonary arterial branches and bronchi with associated postobstructive atelectasis and volume loss in the right middle lobe with adjacent ground glass opacity.  Findings highly concerning for underlying neoplastic process. Mediastinal and axillary adenopathy, with a right axillary lymph node measuring up to 2.0 cm in short axis diameter. No definite evidence of pulmonary thromboembolism.    She is followed by Pulm  She underwent rt axillary LN bx at outside facility- on 1/16/2018 [facility]  Pathology revealed metastatic poorly differentiated carcinoma of unknown primary site  TTF-1 negative, napsin negative  , cytokeratin 7 positive, cytokeratin 20 negative, p63 negative, cytokeratin 5/6 focal dim positivity    She next underwent lung bx at [facility]  l1/31/2017   Pathology revealed benign lung tissue    She underwent repeat Right Lung Bx 2/14/2018 Pathology reveals Squamous cell carcinoma  PD-L1 10% low expression  EGFR NEG  ALK NEG  BRAF pending  Outside slides axillary LN specimen  requested for review/comparison to lung bx findings     She continues with pain in  rt shoulder and back   She also reports pain in back   She is followed by  PULM and told may have shingles  She continues with  LOGAN   She wears 02 at night     Appetite and weight stable   No hemoptysis   Mild  fatigue   She reports occasional cough ,non-productive       PET/CT 1/26/2018   Intense FDG uptake within the known right infrahilar lung mass, compatible with malignancy.   Intensely hypermetabolic right axillary, retropectoral, and lower right cervical lymph nodes, compatible with metastases.  Abnormal FDG uptake along right breast skin thickening, new from prior chest CTA and mammogram.             PrevHx:She had an abnormal mammogram 6/4/2014 which  Revealed a round solid mass 6mm in rt breast.  She then underwent U/S guided core bx of rt breast mass on 6/17/2014.  Pathology revealed infiltrating ductal carcinoma Grade 3 with tumor  Present in thin-walled spaces suggestive of lymphatic spaces. Hormone  Receptor status on tumor specimen revealed ER negative 0% ,  ND negative 0% and Her 2 jose maria negative.  She subsequently underwent rt segmental mastectomy and SLN bx  On 7/11/2014. Pathology of rt breast lumpectomy revealed invasive  Ductal carcinoma with micropapillary pattern ( Invasive micropapillary  Ca) with max tumor dimension 11.5mm with suggestion of tumor in   Thin walled spaces c/w lymphovascular involvement. Surgical  Margins free of tumor, grade 3, ER/ND neg , Her 2 jose maria neg   With [place] Lymph node negative for Neoplasm.   Pathologic staging [id number](i-)  Her mother was diagnosed with breast cancer in her 50's/  She has no family history of ovarian cancer.           Review of  "Systems   Constitutional: Positive for fatigue. Negative for appetite change, fever and unexpected weight change.   HENT: Negative for mouth sores.    Eyes: Negative for visual disturbance.   Respiratory: Positive for cough and shortness of breath (LOGAN).    Cardiovascular: Negative for chest pain.   Gastrointestinal: Negative for abdominal pain and diarrhea.   Genitourinary: Negative for frequency.   Musculoskeletal: Positive for arthralgias and back pain.   Skin: Negative for rash.   Neurological: Negative for headaches.   Hematological: Negative for adenopathy.   Psychiatric/Behavioral: The patient is not nervous/anxious.        Objective:        Vitals:    03/02/18 0810   BP: 116/62   BP Location: Left arm   Patient Position: Sitting   BP Method: Medium (Manual)   Pulse: (!) 45   Temp: 97.9 °F (36.6 °C)   TempSrc: Oral   SpO2: 95%   Weight: 78.4 kg (172 lb 15.2 oz)   Height: 5' 3" (1.6 m)         Physical Exam   Constitutional: She is oriented to person, place, and time. She appears well-developed and well-nourished.   HENT:   Head: Normocephalic.   Mouth/Throat: Oropharynx is clear and moist. No oropharyngeal exudate.   Eyes: Conjunctivae and lids are normal. Pupils are equal, round, and reactive to light. No scleral icterus.   Neck: Normal range of motion. Neck supple. No thyromegaly present.   Cardiovascular: Normal rate, regular rhythm and normal heart sounds.    No murmur heard.  Pulmonary/Chest: Breath sounds normal. She has no wheezes. She has no rales. Right breast exhibits no inverted nipple, no mass, no nipple discharge and no skin change. Left breast exhibits no inverted nipple, no mass, no nipple discharge and no skin change.   Abdominal: Soft. Bowel sounds are normal. She exhibits no distension and no mass. There is no hepatosplenomegaly. There is no tenderness. There is no rebound and no guarding.   Musculoskeletal: Normal range of motion. She exhibits no edema or tenderness.   Lymphadenopathy:     " She has no cervical adenopathy.     She has axillary adenopathy.        Right: No supraclavicular adenopathy present.        Left: No supraclavicular adenopathy present.   Neurological: She is alert and oriented to person, place, and time. No cranial nerve deficit. Coordination normal.   Skin: Skin is warm and dry. No ecchymosis, no petechiae and no rash noted. No erythema.   Psychiatric: She has a normal mood and affect.     Mammo 6/12/2017   Impression:  No mammographic evidence of malignancy.     BI-RADS Category 1: Negative  Mammo 6/12/2017   Impression:  No mammographic evidence of malignancy.     BI-RADS Category 1: Negative      CTA chest w/contrast 11/28/2018  1. Large right hilar mass with involvement of adjacent segmental pulmonary arterial branches and bronchi with associated postobstructive atelectasis and volume loss in the right middle lobe with adjacent ground glass opacity.  Findings highly concerning for underlying neoplastic process.    2. Mediastinal and axillary adenopathy, with a right axillary lymph node measuring up to 2.0 cm in short axis diameter.    3. No definite evidence of pulmonary thromboembolism.      CT a/p w/contrast 11/29/2018   1. No acute abnormality identified within the abdomen and pelvis.    2.  There are a few nonspecific prominent lymph nodes in the upper abdomen including a periesophageal lymph node which measures 1.0 cm in short axis diameter.    3.  Significant abdominal aortic atherosclerosis and abdominal aorta ectasia.    4.  Additional findings as above.    PET/CT 1/26/2018   1.  Intense FDG uptake within the known right infrahilar lung mass, compatible with malignancy.  It is unclear if this represents primary lung malignancy or metastatic breast cancer.    2.  Intensely hypermetabolic right axillary, retropectoral, and lower right cervical lymph nodes, compatible with metastases.    3.  Abnormal FDG uptake along right breast skin thickening, new from prior chest CTA  and mammogram.  This may relate to localized edema/inflammation, though correlation with physical exam and mammography are recommended to exclude underlying inflammatory carcinoma.      MRI Brain w w/out contrast 2/9/2018  1.  No evidence of intracranial metastases.  2.  Sinus disease       Rt axillary LN bx at outside facility- on 1/16/2018 at [facility]  Pathology revealed metastatic poorly differentiated carcinoma of unknown primary  site 1/16/2018 at [facility]  TTF-1 negative, napsin negative  , cytokeratin 7 positive, cytokeratin 20 negative, p63 negative, cytokeratin 5/6 focal dim positivity    LUNG BIOPSY 1/31/2017   Pathology revealed benign lung tissue         SPECIMEN  1) Right Lung Bx 2/14/2018  Supplemental Diagnosis  Immunohistochemical stains show strong nuclear staining for p63 in essentially all tumor cells and very strong  cytoplasmic and membrane staining for CK5/6, also in essentially all tumor cells. TTF-1 and CK7 are negative  within tumor cells but do stain native pulmonary elements present within the biopsy. A stain for mucicarmine is  negative. Positive and negative controls function appropriately.  Final diagnosis: Specimen submitted as right lung biopsy  -Squamous cell carcinoma  (Electronically Signed: 2018-02-20 09:12:07 )  Diagnosed by: Phong Thompson  FINAL PATHOLOGIC DIAGNOSIS  Fragments of pulmonary parenchyma (submitted as right lung biopsy):  Assessment:       1. Squamous cell carcinoma of lung, unspecified laterality    2. Axillary lymphadenopathy    3. History of breast cancer    4. Encounter for antineoplastic chemotherapy    5. Chronic obstructive pulmonary disease, unspecified COPD type    6. SUNITHA (obstructive sleep apnea)        Plan:   ECOG 1  1-6 Pt with hx of Stage 1A invasive ductal carcinoma rt breast s/p rt segmental mastectomy and SLN bx 7/11/2014 ER/HI neg HER 2 jose maria neg [id number](i-).  S/p adjuvant RT completed 9/2014  Pt declined adjuvant  chemo  Follow-up Mammo 6/12/2017 No mammographic evidence of malignancy.  Pt  hospitalized 11/2017 with acute-on-chronic  resp failure  Abnormal CT imaging revealing Large right hilar mass with involvement of  adjacent segmental pulmonary arterial branches and bronchi with associated postobstructive atelectasis  and involvement of mediastinal and axillary adenopathy   S/p rt axillary LN bx ( outside facility) - metastatic poorly differentiated carcinoma of unknown primary  site  status post recent lung biopsy-benign lung tissue  PET/CT 1/26/2018   Intense FDG uptake within the known right infrahilar lung mass, compatible with malignancy.   Intensely hypermetabolic right axillary, retropectoral, and lower right cervical lymph nodes, compatible with metastases.  Abnormal FDG uptake along right breast skin thickening, new from prior chest CTA and mammogram.  Lung bx planned this week- if histologic confirmation of lung primary, plan mutational analysis testing    Repeat lung bx reveals Squamous Cell CA lung  PD-L1 10% low expression  EGFR NEG  ALK NEG  BRAF pending  Pt dicussed at multidisciplinary Tumor board     CANCER Type ID testing pending on axillary LN bx      MRI brain NEG  Pt potentially could have triple negative recurrence vs metastatic squamous    Plan PAC PLACEMENT  Discussed treatment options for 2 primaries including triple negative recurrence vs metastatic squamous  Discussed potential platinol containing regimen to potentially target both primaries  Also discussed potential weekly carbo/taxol with RT   Single agent Taxol is an option,   weekly carbo/taxol with radiation also option but  RT likely not be very beneficial and may cause complications to lung/reduce lung function more.  Discussed w/ Dr. Funez and it is determined that  If lung becomes more symptomatic  then could consider RT  at a later time.     Plan to begin CARBO/TAXOL at this time as this regimen would potentially target both   BSA 1.81    INFORMED CONSENT   CHEMO TEACHING  CBC,CMP TODAY   BEGIN CHEMO FOLLOWING PAC PLACEMENT   F/U CANCER TYPE ID    Discussed with Dr. Christopher    ADDENDUM: FINAL PATHOLOGIC DIAGNOSIS 1. Lymph node, right axilla, biopsy, review of 10 outside slides [facility], JS 18 [phone],  collected January 11, 2018: Metastatic poorly differentiated carcinoma (see comment).  The histologic section shows fibrous stroma infiltrated by nest of poorly differentiated malignant cells including  occasional dyskeratotic cells morphologically suggestive of metastatic squamous cell carcinoma.  Immunohistochemical stains are nonspecific; the cells are positive for cytokeratin 7 and cytokeratin 5/6 (focal) and  negative for cytokeratin 20, TTF-1, p63, GCDFP, mammoglobin, and CEA.   Pt notified of findings. Pt has widely metastatic squamous cell CA lung  In light of pathology findings ( and mutational analysis) plan to switch chemo to Carbo AUC 6 DAY 1/Abraxane 100mg/m2 Day 1, 8, 15   Every 21days  Pt agreeable with plan   Plan to initiate chemo next week    Greater than 45 minutes spent during this visit of which greater than 50% devoted to counseling and coordination of care regarding diagnosis and management plan.    Cc: Swetha Katz M.D.         MD Tory Roberson MD

## 2018-03-02 NOTE — PROGRESS NOTES
Chemotherapy Education    Ade JESSICA Castellano attended chemotherapy class alone after her MD visit.  [facility] cancer resource binder provided to patient as well as handouts on Taxol and Carboplatin.  Most common side effects and regimen reviewed.  Information provided for all support groups and additional relevant resources.  Also provided my contact information for any future questions or concerns.  Maximum support offered.  Schedule provided and reviewed.

## 2018-03-07 ENCOUNTER — SURGERY (OUTPATIENT)
Age: 73
End: 2018-03-07

## 2018-03-07 ENCOUNTER — HOSPITAL ENCOUNTER (OUTPATIENT)
Facility: HOSPITAL | Age: 73
Discharge: HOME | End: 2018-03-07
Attending: INTERNAL MEDICINE | Admitting: RADIOLOGY
Payer: MEDICARE

## 2018-03-07 VITALS
WEIGHT: 172 LBS | RESPIRATION RATE: 18 BRPM | TEMPERATURE: 98 F | BODY MASS INDEX: 30.48 KG/M2 | OXYGEN SATURATION: 97 % | HEART RATE: 54 BPM | DIASTOLIC BLOOD PRESSURE: 62 MMHG | SYSTOLIC BLOOD PRESSURE: 103 MMHG | HEIGHT: 63 IN

## 2018-03-07 DIAGNOSIS — Z51.11 ENCOUNTER FOR ANTINEOPLASTIC CHEMOTHERAPY: ICD-10-CM

## 2018-03-07 DIAGNOSIS — C34.90 SQUAMOUS CELL CARCINOMA OF LUNG: ICD-10-CM

## 2018-03-07 DIAGNOSIS — C34.90 SQUAMOUS CELL CARCINOMA OF LUNG, UNSPECIFIED LATERALITY: ICD-10-CM

## 2018-03-07 PROCEDURE — 25000003 PHARM REV CODE 250: Performed by: RADIOLOGY

## 2018-03-07 PROCEDURE — 63600175 PHARM REV CODE 636 W HCPCS: Performed by: RADIOLOGY

## 2018-03-07 RX ORDER — ACETAMINOPHEN 500 MG
TABLET ORAL
Status: DISCONTINUED
Start: 2018-03-07 | End: 2018-03-07 | Stop reason: HOSPADM

## 2018-03-07 RX ORDER — CEFAZOLIN SODIUM 1 G/50ML
SOLUTION INTRAVENOUS
Status: COMPLETED | OUTPATIENT
Start: 2018-03-07 | End: 2018-03-07

## 2018-03-07 RX ORDER — MIDAZOLAM HYDROCHLORIDE 1 MG/ML
INJECTION INTRAMUSCULAR; INTRAVENOUS CODE/TRAUMA/SEDATION MEDICATION
Status: COMPLETED | OUTPATIENT
Start: 2018-03-07 | End: 2018-03-07

## 2018-03-07 RX ORDER — SODIUM CHLORIDE 9 MG/ML
INJECTION, SOLUTION INTRAVENOUS CONTINUOUS
Status: DISCONTINUED | OUTPATIENT
Start: 2018-03-07 | End: 2018-03-07 | Stop reason: HOSPADM

## 2018-03-07 RX ORDER — ACETAMINOPHEN 500 MG
1000 TABLET ORAL EVERY 6 HOURS PRN
Status: DISCONTINUED | OUTPATIENT
Start: 2018-03-07 | End: 2018-03-07 | Stop reason: HOSPADM

## 2018-03-07 RX ORDER — HYDROCODONE BITARTRATE AND ACETAMINOPHEN 5; 325 MG/1; MG/1
1 TABLET ORAL EVERY 4 HOURS PRN
Status: DISCONTINUED | OUTPATIENT
Start: 2018-03-07 | End: 2018-03-07 | Stop reason: HOSPADM

## 2018-03-07 RX ORDER — FENTANYL CITRATE 50 UG/ML
INJECTION, SOLUTION INTRAMUSCULAR; INTRAVENOUS CODE/TRAUMA/SEDATION MEDICATION
Status: COMPLETED | OUTPATIENT
Start: 2018-03-07 | End: 2018-03-07

## 2018-03-07 RX ADMIN — ACETAMINOPHEN 1000 MG: 500 TABLET ORAL at 10:03

## 2018-03-07 RX ADMIN — FENTANYL CITRATE 50 MCG: 50 INJECTION INTRAMUSCULAR; INTRAVENOUS at 11:03

## 2018-03-07 RX ADMIN — MIDAZOLAM HYDROCHLORIDE 1 MG: 1 INJECTION, SOLUTION INTRAMUSCULAR; INTRAVENOUS at 11:03

## 2018-03-07 RX ADMIN — CEFAZOLIN SODIUM 1 G: 1 SOLUTION INTRAVENOUS at 11:03

## 2018-03-07 RX ADMIN — FENTANYL CITRATE 25 MCG: 50 INJECTION INTRAMUSCULAR; INTRAVENOUS at 11:03

## 2018-03-07 RX ADMIN — MIDAZOLAM HYDROCHLORIDE 0.5 MG: 1 INJECTION, SOLUTION INTRAMUSCULAR; INTRAVENOUS at 11:03

## 2018-03-07 RX ADMIN — SODIUM CHLORIDE: 0.9 INJECTION, SOLUTION INTRAVENOUS at 08:03

## 2018-03-07 NOTE — DISCHARGE SUMMARY
Radiology Discharge Summary      Admit date: 3/7/2018  7:36 AM  Discharge date: March 7, 2018    Instructions Given to patient: YesVerbal    Diet: Regular    Activity:Restriction as listed: No strenuous activities for 48 hours    Medications on discharge (List): Refer to Discharge Medication List    Hospital Course: Placement of Right IJ port-a-cath    Description of Condition on Discharge: stable    Discharge Disposition: Home    Discharge Diagnosis: Lung CA, s/p placement of right IJ port-a-cath    Folow up with Dr. Holliday

## 2018-03-07 NOTE — H&P
[facility]  History & Physical - Short Stay  Interventional Radiology    SUBJECTIVE:     Chief Complaint/Reason for Admission: Lung CA    Informant(s):  self and Electronic Health Record    History of Present Illness:  Ade Castellano is a 72 y.o. female with a history of lung CA.    Patient presents for port-a-cath placement.    Scheduled Meds:    acetaminophen         Continuous Infusions:    sodium chloride 0.9% 100 mL/hr at 03/07/18 0830     PRN Meds: acetaminophen    Review of patient's allergies indicates:  No Known Allergies    Past Medical History:   Diagnosis Date    Acute respiratory failure with hypoxia and hypercapnia 11/29/2017    Angina pectoris     Bell's palsy     left facial weakness    Breast cancer     RIGHT    CAD (coronary artery disease)     Cervical cancer     Chronic bronchitis     COPD (chronic obstructive pulmonary disease)     Dr. Sterling Krueger bridge present     Emphysema of lung     H/O colonoscopy 06/2017    due for repeat colonsocopy in 6/2018    History of heart artery stent     Dr. Ortiz  x2 stents    Hyperlipidemia     Hypertension     Myocardial infarction     SUNITHA (obstructive sleep apnea)     intolerant to mask    Pneumonia     PUD (peptic ulcer disease)     Sleep apnea     Vaginal delivery     x1     Past Surgical History:   Procedure Laterality Date    ADENOIDECTOMY      BREAST BIOPSY Right     BREAST SURGERY      lumpectomy right side     CERVIX SURGERY      COLONOSCOPY N/A 3/17/2017    Procedure: COLONOSCOPY;  Surgeon: Julio Rudd MD;  Location: [facility] ENDO;  Service: Endoscopy;  Laterality: N/A;    COLONOSCOPY N/A 6/30/2017    Procedure: COLONOSCOPY;  Surgeon: Julio Rudd MD;  Location: [facility] ENDO;  Service: Endoscopy;  Laterality: N/A;    sweat glands axillary regions      TONSILLECTOMY       Family History   Problem Relation Age of Onset    Cancer Mother      breast    Heart disease Mother     Breast cancer Mother      Cancer Father      lung    Cancer Sister      lung    Cancer Maternal Grandmother     Heart disease Maternal Grandmother     Cancer Maternal Grandfather     Heart disease Maternal Grandfather     Cancer Paternal Grandmother     Cancer Paternal Grandfather     Cancer Sister      Social History   Substance Use Topics    Smoking status: Former Smoker     Packs/day: 0.25     Years: 50.00     Types: Cigarettes     Quit date: 8/23/2009    Smokeless tobacco: Never Used    Alcohol use No        Review of Systems:  ROS not obtained    OBJECTIVE:     Vital Signs (Most Recent):  Temp: 97.6 °F (36.4 °C) (03/07/18 0805)  Pulse: (!) 49 (03/07/18 0805)  Resp: 16 (03/07/18 0805)  BP: 132/65 (03/07/18 0805)  SpO2: 95 % (03/07/18 0805)    Physical Exam:  Lungs: No respiratory distress  Cardiac: regular rate and rhythm  alert and oriented    Laboratory  CBC:   Lab Results   Component Value Date/Time    WBC 6.81 03/02/2018 09:56 AM    RBC 4.77 03/02/2018 09:56 AM    HGB 14.1 03/02/2018 09:56 AM    HCT 42.9 03/02/2018 09:56 AM     03/02/2018 09:56 AM    MCV 90 03/02/2018 09:56 AM    MCH 29.6 03/02/2018 09:56 AM    MCHC 32.9 03/02/2018 09:56 AM     Coagulation:   Lab Results   Component Value Date/Time    INR 1.0 02/09/2018 10:20 AM    APTT 23.6 11/29/2017 03:04 AM       ASSESSMENT/PLAN:     Lung CA.    Patient will undergo implanted port-a-cath.    Sedation/Anesthesia Assessment:  ASA Classification: III = Severe systemic disease limiting activity  Mallampati Score: II (hard and soft palate, upper portion of tonsils anduvula visible)    Sedation History: No problems    Sedation Plan: Conscious sedation

## 2018-03-07 NOTE — DISCHARGE INSTRUCTIONS
BATHING:  ? You may shower after the dressing is removed.  DRESSING:  ? Remove dressing after 2 days.        ACTIVITY LEVEL: If you have received sedation or an anesthetic, you may feel sleepy for several hours. Rest until you are more awake. Gradually resume your normal activities   No heavy lifting for 4 weeks.      DIET: You may resume your home diet. If nausea is present, increase your diet gradually with fluids and bland foods.    Medications: Pain medication should be taken only if needed and as directed. If antibiotics are prescribed, the medication should be taken until completed. You will be given an updated list of you medications.  ? No driving, alcoholic beverages or signing legal documents for next 24 hours if you have had sedation, or while taking pain medication    CALL THE DOCTOR:   For any obvious bleeding (some dried blood over the incision is normal).     Redness, swelling, foul smell around incision or fever over 101.  Shortness of breath.  Persistent pain or nausea not relieved by medication.  Call  [phone]     to speak with an Interventional Radiologist    If any unusual problems or difficulties occur contact your doctor. If you cannot contact your doctor but feel your signs and symptoms warrant a physicians attention return to the emergency room.         Fall Prevention  Millions of people fall every year and injure themselves. You may have had anesthesia or sedation which may increase your risk of falling. You may have health issues that put you at an increased risk of falling.     Here are ways to reduce your risk of falling.  ·   · Make your home safe by keeping walkways clear of objects you may trip over.  · Use non-slip pads under rugs. Do not use area rugs or small throw rugs.  · Use non-slip mats in bathtubs and showers.  · Install handrails and lights on staircases.  · Do not walk in poorly lit areas.  · Do not stand on chairs or wobbly ladders.  · Use caution when reaching overhead  or looking upward. This position can cause a loss of balance.  · Be sure your shoes fit properly, have non-slip bottoms and are in good condition.   · Wear shoes both inside and out. Avoid going barefoot or wearing slippers.  · Be cautious when going up and down stairs, curbs, and when walking on uneven sidewalks.  · If your balance is poor, consider using a cane or walker.  · If your fall was related to alcohol use, stop or limit alcohol intake.   · If your fall was related to use of sleeping medicines, talk to your doctor about this. You may need to reduce your dosage at bedtime if you awaken during the night to go to the bathroom.    · To reduce the need for nighttime bathroom trips:  ¨ Avoid drinking fluids for several hours before going to bed  ¨ Empty your bladder before going to bed  ¨ Men can keep a urinal at the bedside  · Stay as active as you can. Balance, flexibility, strength, and endurance all come from exercise. They all play a role in preventing falls. Ask your healthcare provider which types of activity are right for you.  · Get your vision checked on a regular basis.  · If you have pets, know where they are before you stand up or walk so you don't trip over them.  · Use night lights.

## 2018-03-07 NOTE — OP NOTE
[facility]  Interventional Radiology  Procedure - Outpatient    Date: 03/07/2018 Time: 12:13 PM    Pre-Op Diagnosis: Lung CA    Post-Op Diagnosis: same    Procedure Performed by: Sabino Duenas MD    Assistant: none    Procedure: Right IJ Port-A-Cath placement    Specimen/Tissue Removed: No    Estimated Blood Loss: Less than 10 mL    Procedure Note/Findings: Left IJ vein not suitable for line. Port placed on Right IJ approach and ready for use. No immediate post-procedure complications noted.    Please refer to dictated report for additional details.

## 2018-03-08 NOTE — PROGRESS NOTES
New Breast Cancer  History and Physical  [facility]  Department of Surgery    REFERRING PROVIDER: Mildred Holliday MD  [address]  [address]  [place] [address]    CHIEF COMPLAINT: history of right breast cancer now with lung primary and new axillary adenopathy Right    Subjective:      Ade Castellano is a 72 y.o. postmenopausal female referred for evaluation of new axillary right adenopathy. History of RIGHT breast cancer in  treated with lumpectomy/SLNB/XRT.  Triple negative.  No chemo. No endocrine therapy.    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  Presents to my clinic with right adenopathy with biopsy showing poorly differentiated carcinoma.     Here to discuss options.    Patient does routinely do self breast exams.  Patient has not noted a change on breast exam.  Patient denies nipple discharge. Patient reports to previous breast biopsy. Patient reports a personal history of breast cancer.      GYN History:  Age of menarche was 13. Age of menopause was 50. Patient denies hormonal therapy. Patient is . Age of first live birth was 24. Patient did not breast feed.    FAMILY History:  Son kidney cacner age 45  Mother br cancer age 56  Sister lung cancer  Father lung cancer    Past Medical History:   Diagnosis Date    Acute respiratory failure with hypoxia and hypercapnia 2017    Angina pectoris     Bell's palsy     left facial weakness    Breast cancer     RIGHT    CAD (coronary artery disease)     Cervical cancer     Chronic bronchitis     COPD (chronic obstructive pulmonary disease)     Dr. Katz    Dental bridge present     Emphysema of lung     H/O colonoscopy 2017    due for repeat colonsocopy in 2018    History of heart artery stent     Dr. Ortiz  x2 stents    Hyperlipidemia     Hypertension     Myocardial infarction     SUNITHA (obstructive sleep apnea)     intolerant to mask     Pneumonia     PUD (peptic ulcer disease)     Sleep apnea     Vaginal delivery     x1     Past Surgical History:   Procedure Laterality Date    ADENOIDECTOMY      BREAST BIOPSY Right     BREAST SURGERY      lumpectomy right side     CERVIX SURGERY      COLONOSCOPY N/A 3/17/2017    Procedure: COLONOSCOPY;  Surgeon: Julio Rudd MD;  Location: [facility] ENDO;  Service: Endoscopy;  Laterality: N/A;    COLONOSCOPY N/A 6/30/2017    Procedure: COLONOSCOPY;  Surgeon: Julio Rudd MD;  Location: [facility] ENDO;  Service: Endoscopy;  Laterality: N/A;    sweat glands axillary regions      TONSILLECTOMY       Current Outpatient Prescriptions on File Prior to Visit   Medication Sig Dispense Refill    albuterol (PROVENTIL) 2.5 mg /3 mL (0.083 %) nebulizer solution NEBULIZE 1 vial EVERY 6 HOURS AS NEEDED FOR WHEEZING 540 mL 1    albuterol (VENTOLIN HFA) 90 mcg/actuation inhaler INHALE 2 PUFF(S) BY MOUTH EVERY 4 - 6 HOURS AS NEEDED FOR SHORTNESS OF BREATH or WHEEZING 18 g 5    ascorbic acid (VITAMIN C) 500 MG tablet Take 500 mg by mouth once daily.      aspirin (ECOTRIN) 325 MG EC tablet Take 325 mg by mouth once daily.      epinastine 0.05 % ophthalmic solution PLACE 1 DROP(S) IN BOTH EYES TWICE DAILY  2    fluticasone (FLONASE) 50 mcg/actuation nasal spray USE TWO SPRAYS IN EACH NOSTRIL ONCE A DAY  6    isosorbide mononitrate (IMDUR) 60 MG 24 hr tablet Take 30 mg by mouth once daily.   0    metoprolol tartrate (LOPRESSOR) 25 MG tablet Take 25 mg by mouth 2 (two) times daily.       NITROSTAT 0.4 mg SL tablet place 1 tablet under the tongue AS NEEDED no more than 3 in 15 minutes 25 tablet 0    rosuvastatin (CRESTOR) 20 MG tablet Take 1 tablet (20 mg total) by mouth once daily. (Patient taking differently: Take 10 mg by mouth once daily. ) 90 tablet 1    SPIRIVA RESPIMAT 2.5 mcg/actuation Mist Inhale 1 puff into the lungs once daily.       No current facility-administered medications on file prior to visit.      Social  "History     Social History    Marital status: Single     Spouse name: N/A    Number of children: N/A    Years of education: N/A     Occupational History    Not on file.     Social History Main Topics    Smoking status: Former Smoker     Packs/day: 0.25     Years: 50.00     Types: Cigarettes     Quit date: 8/23/2009    Smokeless tobacco: Never Used    Alcohol use No    Drug use: No    Sexual activity: No     Other Topics Concern    Not on file     Social History Narrative    No narrative on file     Family History   Problem Relation Age of Onset    Cancer Mother      breast    Heart disease Mother     Breast cancer Mother     Cancer Father      lung    Cancer Sister      lung    Cancer Maternal Grandmother     Heart disease Maternal Grandmother     Cancer Maternal Grandfather     Heart disease Maternal Grandfather     Cancer Paternal Grandmother     Cancer Paternal Grandfather     Cancer Sister         Review of Systems  Review of Systems   Constitutional: Negative for fatigue and fever.   HENT: Negative for sore throat and trouble swallowing.    Eyes: Negative for visual disturbance.   Respiratory: Negative for cough and shortness of breath.    Cardiovascular: Negative for chest pain and palpitations.   Gastrointestinal: Negative for abdominal pain, constipation, diarrhea and nausea.   Genitourinary: Negative for difficulty urinating and dysuria.   Musculoskeletal: Negative for back pain.        Right should pain  Lymphedema right arm     Neurological: Negative for dizziness, weakness and headaches.   Hematological: Negative for adenopathy.        Objective:   PHYSICAL EXAM:  /70   Temp 97.8 °F (36.6 °C) (Oral)   Ht 5' 3" (1.6 m)   Wt 78.3 kg (172 lb 9.9 oz)   LMP  (LMP Unknown)   BMI 30.58 kg/m²     Physical Exam   Constitutional: She is oriented to person, place, and time. She appears well-developed.   HENT:   Head: Normocephalic.   Eyes: Pupils are equal, round, and reactive to " light.   Neck: Normal range of motion.   Cardiovascular: Normal rate.    Pulmonary/Chest: Effort normal. She exhibits no tenderness and no deformity. Right breast exhibits no inverted nipple, no mass, no nipple discharge, no skin change and no tenderness. Left breast exhibits no inverted nipple, no mass, no nipple discharge, no skin change and no tenderness.       Abdominal: Soft. She exhibits no distension.   Musculoskeletal: She exhibits no edema.   Lymphadenopathy:     She has no cervical adenopathy.     She has axillary adenopathy.        Right axillary: Pectoral and lateral adenopathy present.        Right: No supraclavicular adenopathy present.        Left: No supraclavicular adenopathy present.   Neurological: She is alert and oriented to person, place, and time.   Psychiatric: She has a normal mood and affect.         Radiology review: Images personally reviewed by me in the clinic.       Assessment:      Ade Castellano is a 72 y.o. postmenopausal female with recently diagnosed squamous cell carcinoma of the lung and now with right axillary adenopathy.    Plan:   Discuss with Dr. Holliday    Will obtain axillary biopsy from [facility] to compare to lung biopsy.  Discussed could be triple negative recurrence vs metastatic squamous.  It is unusual for squamous to go to the axilla. Will discuss at tumor board and contact the patient.     Total time spent with the patient: 60 minutes.  45 minutes of face to face consultation and 15 minutes of chart review and coordination of care.

## 2018-03-09 RX ORDER — FAMOTIDINE 10 MG/ML
20 INJECTION INTRAVENOUS
Status: CANCELLED | OUTPATIENT
Start: 2018-03-27

## 2018-03-09 RX ORDER — SODIUM CHLORIDE 0.9 % (FLUSH) 0.9 %
10 SYRINGE (ML) INJECTION
Status: CANCELLED | OUTPATIENT
Start: 2018-03-13

## 2018-03-09 RX ORDER — EPINEPHRINE 0.3 MG/.3ML
0.3 INJECTION SUBCUTANEOUS ONCE AS NEEDED
Status: CANCELLED | OUTPATIENT
Start: 2018-03-20 | End: 2018-03-20

## 2018-03-09 RX ORDER — DIPHENHYDRAMINE HYDROCHLORIDE 50 MG/ML
50 INJECTION INTRAMUSCULAR; INTRAVENOUS ONCE AS NEEDED
Status: CANCELLED | OUTPATIENT
Start: 2018-03-13 | End: 2018-03-13

## 2018-03-09 RX ORDER — FAMOTIDINE 10 MG/ML
20 INJECTION INTRAVENOUS
Status: CANCELLED | OUTPATIENT
Start: 2018-03-20

## 2018-03-09 RX ORDER — FAMOTIDINE 10 MG/ML
20 INJECTION INTRAVENOUS
Status: CANCELLED | OUTPATIENT
Start: 2018-03-13

## 2018-03-09 RX ORDER — SODIUM CHLORIDE 0.9 % (FLUSH) 0.9 %
10 SYRINGE (ML) INJECTION
Status: CANCELLED | OUTPATIENT
Start: 2018-03-27

## 2018-03-09 RX ORDER — HEPARIN 100 UNIT/ML
500 SYRINGE INTRAVENOUS
Status: CANCELLED | OUTPATIENT
Start: 2018-03-20

## 2018-03-09 RX ORDER — SODIUM CHLORIDE 0.9 % (FLUSH) 0.9 %
10 SYRINGE (ML) INJECTION
Status: CANCELLED | OUTPATIENT
Start: 2018-03-20

## 2018-03-09 RX ORDER — HEPARIN 100 UNIT/ML
500 SYRINGE INTRAVENOUS
Status: CANCELLED | OUTPATIENT
Start: 2018-03-27

## 2018-03-09 RX ORDER — DIPHENHYDRAMINE HYDROCHLORIDE 50 MG/ML
50 INJECTION INTRAMUSCULAR; INTRAVENOUS ONCE AS NEEDED
Status: CANCELLED | OUTPATIENT
Start: 2018-03-20 | End: 2018-03-20

## 2018-03-09 RX ORDER — EPINEPHRINE 0.3 MG/.3ML
0.3 INJECTION SUBCUTANEOUS ONCE AS NEEDED
Status: CANCELLED | OUTPATIENT
Start: 2018-03-13 | End: 2018-03-13

## 2018-03-09 RX ORDER — DIPHENHYDRAMINE HYDROCHLORIDE 50 MG/ML
50 INJECTION INTRAMUSCULAR; INTRAVENOUS ONCE AS NEEDED
Status: CANCELLED | OUTPATIENT
Start: 2018-03-27 | End: 2018-03-27

## 2018-03-09 RX ORDER — EPINEPHRINE 0.3 MG/.3ML
0.3 INJECTION SUBCUTANEOUS ONCE AS NEEDED
Status: CANCELLED | OUTPATIENT
Start: 2018-03-27 | End: 2018-03-27

## 2018-03-09 RX ORDER — HEPARIN 100 UNIT/ML
500 SYRINGE INTRAVENOUS
Status: CANCELLED | OUTPATIENT
Start: 2018-03-13

## 2018-03-12 ENCOUNTER — INFUSION (OUTPATIENT)
Dept: INFUSION THERAPY | Facility: HOSPITAL | Age: 73
End: 2018-03-12
Attending: INTERNAL MEDICINE
Payer: MEDICARE

## 2018-03-12 VITALS
SYSTOLIC BLOOD PRESSURE: 114 MMHG | TEMPERATURE: 98 F | RESPIRATION RATE: 20 BRPM | DIASTOLIC BLOOD PRESSURE: 71 MMHG | HEART RATE: 62 BPM | OXYGEN SATURATION: 96 %

## 2018-03-12 DIAGNOSIS — C34.90 SQUAMOUS CELL CARCINOMA OF LUNG, UNSPECIFIED LATERALITY: Primary | ICD-10-CM

## 2018-03-12 PROCEDURE — 63600175 PHARM REV CODE 636 W HCPCS: Performed by: INTERNAL MEDICINE

## 2018-03-12 PROCEDURE — 96413 CHEMO IV INFUSION 1 HR: CPT

## 2018-03-12 PROCEDURE — 96375 TX/PRO/DX INJ NEW DRUG ADDON: CPT

## 2018-03-12 PROCEDURE — 96367 TX/PROPH/DG ADDL SEQ IV INF: CPT

## 2018-03-12 PROCEDURE — 25000003 PHARM REV CODE 250: Performed by: INTERNAL MEDICINE

## 2018-03-12 PROCEDURE — A4216 STERILE WATER/SALINE, 10 ML: HCPCS | Performed by: INTERNAL MEDICINE

## 2018-03-12 PROCEDURE — 96417 CHEMO IV INFUS EACH ADDL SEQ: CPT

## 2018-03-12 PROCEDURE — S0028 INJECTION, FAMOTIDINE, 20 MG: HCPCS | Performed by: INTERNAL MEDICINE

## 2018-03-12 RX ORDER — HEPARIN 100 UNIT/ML
500 SYRINGE INTRAVENOUS
Status: DISCONTINUED | OUTPATIENT
Start: 2018-03-12 | End: 2018-03-12 | Stop reason: HOSPADM

## 2018-03-12 RX ORDER — SODIUM CHLORIDE 0.9 % (FLUSH) 0.9 %
10 SYRINGE (ML) INJECTION
Status: DISCONTINUED | OUTPATIENT
Start: 2018-03-12 | End: 2018-03-12 | Stop reason: HOSPADM

## 2018-03-12 RX ORDER — FAMOTIDINE 10 MG/ML
20 INJECTION INTRAVENOUS
Status: COMPLETED | OUTPATIENT
Start: 2018-03-12 | End: 2018-03-12

## 2018-03-12 RX ADMIN — DIPHENHYDRAMINE HYDROCHLORIDE 50 MG: 50 INJECTION INTRAMUSCULAR; INTRAVENOUS at 09:03

## 2018-03-12 RX ADMIN — SODIUM CHLORIDE, PRESERVATIVE FREE 10 ML: 5 INJECTION INTRAVENOUS at 12:03

## 2018-03-12 RX ADMIN — HEPARIN 500 UNITS: 100 SYRINGE at 12:03

## 2018-03-12 RX ADMIN — CARBOPLATIN 620 MG: 10 INJECTION INTRAVENOUS at 11:03

## 2018-03-12 RX ADMIN — PACLITAXEL 190 MG: 100 INJECTION, POWDER, LYOPHILIZED, FOR SUSPENSION INTRAVENOUS at 10:03

## 2018-03-12 RX ADMIN — FAMOTIDINE 20 MG: 10 INJECTION INTRAVENOUS at 09:03

## 2018-03-12 RX ADMIN — PALONOSETRON HYDROCHLORIDE: 0.25 INJECTION INTRAVENOUS at 09:03

## 2018-03-12 NOTE — PLAN OF CARE
Problem: Patient Care Overview  Goal: Plan of Care Review  Outcome: Ongoing (interventions implemented as appropriate)  Patient received Carboplatin and Abraxane. Tolerated well. No reactions noted during visit. VSS. Patient received discharge instructions and verbalized understanding.

## 2018-03-13 ENCOUNTER — DOCUMENTATION ONLY (OUTPATIENT)
Dept: INFUSION THERAPY | Facility: HOSPITAL | Age: 73
End: 2018-03-13

## 2018-03-13 NOTE — PROGRESS NOTES
"Received call from CECI Healy. States pt called and reported increased BP the evening after first chemo infusion. Patient normally runs 110/60 and was running 180/102 last night. Pt also c/o  mild chest "tightness". Spoke with pt and she said she took one SL nitro tab and a rolaid. Pt states she found more relief from the rolaid than the nitroglycerin. Patient stated she ate red beans and tacos after chemo and she thinks the chest tightness was indigestion. Told patient to continue to monitor BP and call PCP if it continues to be elevated. If patient experiences HA, blurred vision, chest pain or any other symptoms then she is to report to the ED. Patient verbalized understanding.   "

## 2018-03-16 ENCOUNTER — OFFICE VISIT (OUTPATIENT)
Dept: FAMILY MEDICINE | Facility: CLINIC | Age: 73
End: 2018-03-16
Payer: MEDICARE

## 2018-03-16 ENCOUNTER — LAB VISIT (OUTPATIENT)
Dept: LAB | Facility: HOSPITAL | Age: 73
End: 2018-03-16
Attending: PHYSICIAN ASSISTANT
Payer: MEDICARE

## 2018-03-16 VITALS
RESPIRATION RATE: 16 BRPM | TEMPERATURE: 98 F | HEIGHT: 63 IN | DIASTOLIC BLOOD PRESSURE: 74 MMHG | BODY MASS INDEX: 30.31 KG/M2 | HEART RATE: 57 BPM | SYSTOLIC BLOOD PRESSURE: 110 MMHG | WEIGHT: 171.06 LBS | OXYGEN SATURATION: 95 %

## 2018-03-16 DIAGNOSIS — R73.03 PREDIABETES: ICD-10-CM

## 2018-03-16 DIAGNOSIS — E78.5 HYPERLIPIDEMIA LDL GOAL <70: Chronic | ICD-10-CM

## 2018-03-16 DIAGNOSIS — I25.10 CORONARY ARTERY DISEASE INVOLVING NATIVE CORONARY ARTERY WITHOUT ANGINA PECTORIS, UNSPECIFIED WHETHER NATIVE OR TRANSPLANTED HEART: Chronic | ICD-10-CM

## 2018-03-16 DIAGNOSIS — Z00.00 ENCOUNTER FOR PREVENTIVE HEALTH EXAMINATION: Primary | ICD-10-CM

## 2018-03-16 DIAGNOSIS — J44.9 CHRONIC OBSTRUCTIVE PULMONARY DISEASE, UNSPECIFIED COPD TYPE: Chronic | ICD-10-CM

## 2018-03-16 DIAGNOSIS — C34.91 SQUAMOUS CELL CARCINOMA OF RIGHT LUNG: Chronic | ICD-10-CM

## 2018-03-16 DIAGNOSIS — I25.2 OLD MYOCARDIAL INFARCTION: ICD-10-CM

## 2018-03-16 DIAGNOSIS — I20.89 STABLE ANGINA: Chronic | ICD-10-CM

## 2018-03-16 DIAGNOSIS — I70.0 ATHEROSCLEROSIS OF AORTA: Chronic | ICD-10-CM

## 2018-03-16 DIAGNOSIS — G47.33 OSA (OBSTRUCTIVE SLEEP APNEA): Chronic | ICD-10-CM

## 2018-03-16 DIAGNOSIS — Z85.3 HISTORY OF BREAST CANCER: ICD-10-CM

## 2018-03-16 PROBLEM — C34.90 SQUAMOUS CELL CARCINOMA OF LUNG: Status: RESOLVED | Noted: 2018-03-07 | Resolved: 2018-03-16

## 2018-03-16 PROBLEM — J98.4 CAVITARY PNEUMONIA: Status: RESOLVED | Noted: 2017-11-29 | Resolved: 2018-03-16

## 2018-03-16 PROBLEM — R13.19 ESOPHAGEAL DYSPHAGIA: Status: RESOLVED | Noted: 2017-10-11 | Resolved: 2018-03-16

## 2018-03-16 PROBLEM — Z12.11 SCREENING FOR COLON CANCER: Status: RESOLVED | Noted: 2017-03-17 | Resolved: 2018-03-16

## 2018-03-16 PROBLEM — Z72.0 TOBACCO ABUSE: Status: RESOLVED | Noted: 2017-11-29 | Resolved: 2018-03-16

## 2018-03-16 PROBLEM — J18.9 CAVITARY PNEUMONIA: Status: RESOLVED | Noted: 2017-11-29 | Resolved: 2018-03-16

## 2018-03-16 PROBLEM — R91.8 LUNG MASS: Status: RESOLVED | Noted: 2017-11-29 | Resolved: 2018-03-16

## 2018-03-16 PROBLEM — C34.90 SQUAMOUS CELL CARCINOMA LUNG: Chronic | Status: ACTIVE | Noted: 2018-03-02

## 2018-03-16 PROCEDURE — 99499 UNLISTED E&M SERVICE: CPT | Mod: S$GLB,,, | Performed by: PHYSICIAN ASSISTANT

## 2018-03-16 PROCEDURE — 99999 PR PBB SHADOW E&M-EST. PATIENT-LVL V: CPT | Mod: PBBFAC,,, | Performed by: PHYSICIAN ASSISTANT

## 2018-03-16 PROCEDURE — 36415 COLL VENOUS BLD VENIPUNCTURE: CPT | Mod: PO

## 2018-03-16 PROCEDURE — 83036 HEMOGLOBIN GLYCOSYLATED A1C: CPT

## 2018-03-16 PROCEDURE — G0439 PPPS, SUBSEQ VISIT: HCPCS | Mod: S$GLB,,, | Performed by: PHYSICIAN ASSISTANT

## 2018-03-16 NOTE — PATIENT INSTRUCTIONS
Counseling and Referral of Other Preventative  (Italic type indicates deductible and co-insurance are waived)    Patient Name: Ade Castellano  Today's Date: 3/16/2018    Health Maintenance       Date Due Completion Date    TETANUS VACCINE 10/08/1963 ---    Zoster Vaccine 08/17/2018 (Originally 10/8/2006) ---    Colonoscopy 06/30/2018 6/30/2017    Override on 6/30/2017: Done    Pneumococcal (65+) (2 of 2 - PPSV23) 07/11/2018 7/11/2017    High Dose Statin 03/16/2019 3/16/2018    Mammogram 02/09/2020 2/9/2018    Override on 6/12/2017: Done    DEXA SCAN 03/08/2020 3/8/2017    Override on 3/8/2017: Done    Lipid Panel 07/20/2022 7/20/2017        Orders Placed This Encounter   Procedures    Hemoglobin A1c     The following information is provided to all patients.  This information is to help you find resources for any of the problems found today that may be affecting your health:                Living healthy guide: www.[facility].[place].gov      Understanding Diabetes: www.diabetes.org      Eating healthy: www.cdc.gov/healthyweight      CDC home safety checklist: www.cdc.gov/steadi/patient.html      Agency on Aging: www.goea.[place].gov      Alcoholics anonymous (AA): www.aa.org      Physical Activity: www.anca.nih.gov/lm8wpws      Tobacco use: www.quitwithusla.org

## 2018-03-16 NOTE — PROGRESS NOTES
"Ade Castellano presented for a  Medicare AWV and comprehensive Health Risk Assessment today. The following components were reviewed and updated:    · Medical history  · Family History  · Social history  · Allergies and Current Medications  · Health Risk Assessment  · Health Maintenance  · Care Team     ** See Completed Assessments for Annual Wellness Visit within the encounter summary.**       The following assessments were completed:  · Living Situation  · CAGE  · Depression Screening  · Timed Get Up and Go  · Whisper Test  · Cognitive Function Screening  · Nutrition Screening  · ADL Screening  · PAQ Screening    ·   ·     Vitals:    03/16/18 0815   BP: 110/74   Pulse: (!) 57   Resp: 16   Temp: 97.9 °F (36.6 °C)   TempSrc: Oral   SpO2: 95%   Weight: 77.6 kg (171 lb 1.2 oz)   Height: 5' 3" (1.6 m)     Body mass index is 30.3 kg/m².  Physical Exam      Diagnoses and health risks identified today and associated recommendations/orders:    1. Prediabetes  - Hemoglobin A1c; Future    2. Encounter for preventive health examination  She is doing as well as she could be with everything going on.   She states she tolerated last chemo treatment well     3. Chronic obstructive pulmonary disease, unspecified COPD type  The current medical regimen is effective;  continue present plan and medications.  Continue to follow up with Dr. Katz    4. Atherosclerosis of aorta  The current medical regimen is effective;  continue present plan and medications.  continue to monitor lipids     5. Coronary artery disease involving native coronary artery without angina pectoris, unspecified whether native or transplanted heart  The current medical regimen is effective;  continue present plan and medications.  Continue to follow up with Dr. Ortiz    6. Hyperlipidemia LDL goal <70  The current medical regimen is effective;  continue present plan and medications.    7. Old myocardial infarction  The current medical regimen is effective;  continue " present plan and medications.    8. Stable angina  The current medical regimen is effective;  continue present plan and medications.    9. History of breast cancer  Continue to follow up with Dr. Holliday    10. Squamous cell carcinoma of right lung  The current medical regimen is effective;  continue present plan and medications.  Continue to follow up with Dr. Holliday.    11. SUNITHA (obstructive sleep apnea)  Intolerant of mask.       Provided Ade with a 5-10 year written screening schedule and personal prevention plan. Recommendations were developed using the USPSTF age appropriate recommendations. Education, counseling, and referrals were provided as needed. After Visit Summary printed and given to patient which includes a list of additional screenings\tests needed.    No Follow-up on file.    OBDULIO Jay

## 2018-03-17 LAB
ESTIMATED AVG GLUCOSE: 120 MG/DL
HBA1C MFR BLD HPLC: 5.8 %

## 2018-03-19 ENCOUNTER — INFUSION (OUTPATIENT)
Dept: INFUSION THERAPY | Facility: HOSPITAL | Age: 73
End: 2018-03-19
Attending: INTERNAL MEDICINE
Payer: MEDICARE

## 2018-03-19 VITALS — DIASTOLIC BLOOD PRESSURE: 71 MMHG | SYSTOLIC BLOOD PRESSURE: 143 MMHG | RESPIRATION RATE: 16 BRPM | HEART RATE: 55 BPM

## 2018-03-19 DIAGNOSIS — C34.90 SQUAMOUS CELL CARCINOMA OF LUNG, UNSPECIFIED LATERALITY: Primary | ICD-10-CM

## 2018-03-19 DIAGNOSIS — G89.3 NEOPLASM RELATED PAIN: Primary | ICD-10-CM

## 2018-03-19 PROCEDURE — 96367 TX/PROPH/DG ADDL SEQ IV INF: CPT

## 2018-03-19 PROCEDURE — 96413 CHEMO IV INFUSION 1 HR: CPT

## 2018-03-19 PROCEDURE — 96375 TX/PRO/DX INJ NEW DRUG ADDON: CPT

## 2018-03-19 PROCEDURE — 25000003 PHARM REV CODE 250: Performed by: INTERNAL MEDICINE

## 2018-03-19 PROCEDURE — S0028 INJECTION, FAMOTIDINE, 20 MG: HCPCS | Performed by: INTERNAL MEDICINE

## 2018-03-19 PROCEDURE — A4216 STERILE WATER/SALINE, 10 ML: HCPCS | Performed by: INTERNAL MEDICINE

## 2018-03-19 PROCEDURE — 63600175 PHARM REV CODE 636 W HCPCS: Performed by: INTERNAL MEDICINE

## 2018-03-19 RX ORDER — FAMOTIDINE 10 MG/ML
20 INJECTION INTRAVENOUS
Status: COMPLETED | OUTPATIENT
Start: 2018-03-19 | End: 2018-03-19

## 2018-03-19 RX ORDER — SODIUM CHLORIDE 0.9 % (FLUSH) 0.9 %
10 SYRINGE (ML) INJECTION
Status: DISCONTINUED | OUTPATIENT
Start: 2018-03-19 | End: 2018-03-19 | Stop reason: HOSPADM

## 2018-03-19 RX ORDER — HYDROCODONE BITARTRATE AND ACETAMINOPHEN 5; 325 MG/1; MG/1
1 TABLET ORAL EVERY 6 HOURS PRN
Qty: 30 TABLET | Refills: 0 | Status: SHIPPED | OUTPATIENT
Start: 2018-03-19 | End: 2018-08-23

## 2018-03-19 RX ORDER — HEPARIN 100 UNIT/ML
500 SYRINGE INTRAVENOUS
Status: DISCONTINUED | OUTPATIENT
Start: 2018-03-19 | End: 2018-03-19 | Stop reason: HOSPADM

## 2018-03-19 RX ADMIN — PALONOSETRON HYDROCHLORIDE: 0.25 INJECTION INTRAVENOUS at 09:03

## 2018-03-19 RX ADMIN — HEPARIN 500 UNITS: 100 SYRINGE at 10:03

## 2018-03-19 RX ADMIN — DIPHENHYDRAMINE HYDROCHLORIDE 50 MG: 50 INJECTION INTRAMUSCULAR; INTRAVENOUS at 08:03

## 2018-03-19 RX ADMIN — FAMOTIDINE 20 MG: 10 INJECTION INTRAVENOUS at 08:03

## 2018-03-19 RX ADMIN — SODIUM CHLORIDE: 0.9 INJECTION, SOLUTION INTRAVENOUS at 09:03

## 2018-03-19 RX ADMIN — PACLITAXEL 180 MG: 100 INJECTION, POWDER, LYOPHILIZED, FOR SUSPENSION INTRAVENOUS at 09:03

## 2018-03-19 RX ADMIN — SODIUM CHLORIDE, PRESERVATIVE FREE 10 ML: 5 INJECTION INTRAVENOUS at 10:03

## 2018-03-19 NOTE — TELEPHONE ENCOUNTER
Pt at infusion, c/o pain in bilat hips & legs, Tylenol not helping. Discussed with , will give script for Vicodin.

## 2018-03-19 NOTE — PLAN OF CARE
Problem: Patient Care Overview  Goal: Plan of Care Review  Outcome: Ongoing (interventions implemented as appropriate)  Patient received Abraxane. Tolerated well. No reactions noted during visit. VSS. Patient c/o bilateral leg pain. MD notified. Informed patient to  prescription for pain med from MD office. Patient verbalized understanding. Ambulated unassisted off unit.

## 2018-03-26 ENCOUNTER — INFUSION (OUTPATIENT)
Dept: INFUSION THERAPY | Facility: HOSPITAL | Age: 73
End: 2018-03-26
Attending: INTERNAL MEDICINE
Payer: MEDICARE

## 2018-03-26 VITALS
RESPIRATION RATE: 20 BRPM | HEART RATE: 58 BPM | OXYGEN SATURATION: 96 % | TEMPERATURE: 98 F | DIASTOLIC BLOOD PRESSURE: 67 MMHG | SYSTOLIC BLOOD PRESSURE: 124 MMHG

## 2018-03-26 DIAGNOSIS — C34.90 SQUAMOUS CELL CARCINOMA OF LUNG, UNSPECIFIED LATERALITY: Primary | ICD-10-CM

## 2018-03-26 PROCEDURE — 96367 TX/PROPH/DG ADDL SEQ IV INF: CPT

## 2018-03-26 PROCEDURE — 96413 CHEMO IV INFUSION 1 HR: CPT

## 2018-03-26 PROCEDURE — A4216 STERILE WATER/SALINE, 10 ML: HCPCS | Performed by: INTERNAL MEDICINE

## 2018-03-26 PROCEDURE — S0028 INJECTION, FAMOTIDINE, 20 MG: HCPCS | Performed by: INTERNAL MEDICINE

## 2018-03-26 PROCEDURE — 25000003 PHARM REV CODE 250: Performed by: INTERNAL MEDICINE

## 2018-03-26 PROCEDURE — 63600175 PHARM REV CODE 636 W HCPCS: Performed by: INTERNAL MEDICINE

## 2018-03-26 RX ORDER — FAMOTIDINE 20 MG/50ML
20 INJECTION, SOLUTION INTRAVENOUS
Status: COMPLETED | OUTPATIENT
Start: 2018-03-26 | End: 2018-03-26

## 2018-03-26 RX ORDER — SODIUM CHLORIDE 0.9 % (FLUSH) 0.9 %
10 SYRINGE (ML) INJECTION
Status: DISCONTINUED | OUTPATIENT
Start: 2018-03-26 | End: 2018-03-26 | Stop reason: HOSPADM

## 2018-03-26 RX ORDER — FAMOTIDINE 10 MG/ML
20 INJECTION INTRAVENOUS
Status: DISCONTINUED | OUTPATIENT
Start: 2018-03-26 | End: 2018-03-26

## 2018-03-26 RX ORDER — HEPARIN 100 UNIT/ML
500 SYRINGE INTRAVENOUS
Status: DISCONTINUED | OUTPATIENT
Start: 2018-03-26 | End: 2018-03-26 | Stop reason: HOSPADM

## 2018-03-26 RX ADMIN — PALONOSETRON HYDROCHLORIDE: 0.25 INJECTION INTRAVENOUS at 09:03

## 2018-03-26 RX ADMIN — SODIUM CHLORIDE, PRESERVATIVE FREE 10 ML: 5 INJECTION INTRAVENOUS at 10:03

## 2018-03-26 RX ADMIN — PACLITAXEL 180 MG: 100 INJECTION, POWDER, LYOPHILIZED, FOR SUSPENSION INTRAVENOUS at 09:03

## 2018-03-26 RX ADMIN — FAMOTIDINE 20 MG: 20 INJECTION, SOLUTION INTRAVENOUS at 09:03

## 2018-03-26 RX ADMIN — HEPARIN 500 UNITS: 100 SYRINGE at 10:03

## 2018-03-26 RX ADMIN — DIPHENHYDRAMINE HYDROCHLORIDE 50 MG: 50 INJECTION INTRAMUSCULAR; INTRAVENOUS at 08:03

## 2018-03-26 NOTE — PLAN OF CARE
Problem: Patient Care Overview  Goal: Plan of Care Review  Outcome: Ongoing (interventions implemented as appropriate)  Patient received Abraxane. Tolerated well. VSS. Received discharge instructions and verbalized understanding.

## 2018-03-29 ENCOUNTER — OFFICE VISIT (OUTPATIENT)
Dept: HEMATOLOGY/ONCOLOGY | Facility: CLINIC | Age: 73
End: 2018-03-29
Payer: MEDICARE

## 2018-03-29 ENCOUNTER — LAB VISIT (OUTPATIENT)
Dept: LAB | Facility: HOSPITAL | Age: 73
End: 2018-03-29
Attending: INTERNAL MEDICINE
Payer: MEDICARE

## 2018-03-29 VITALS
HEART RATE: 58 BPM | BODY MASS INDEX: 30.46 KG/M2 | WEIGHT: 171.94 LBS | OXYGEN SATURATION: 95 % | TEMPERATURE: 98 F | HEIGHT: 63 IN

## 2018-03-29 DIAGNOSIS — Z51.11 ENCOUNTER FOR CHEMOTHERAPY MANAGEMENT: ICD-10-CM

## 2018-03-29 DIAGNOSIS — Z85.3 HISTORY OF BREAST CANCER: ICD-10-CM

## 2018-03-29 DIAGNOSIS — D69.6 THROMBOCYTOPENIA: ICD-10-CM

## 2018-03-29 DIAGNOSIS — C34.91 SQUAMOUS CELL CARCINOMA OF RIGHT LUNG: Primary | Chronic | ICD-10-CM

## 2018-03-29 DIAGNOSIS — C34.90 SQUAMOUS CELL CARCINOMA OF LUNG, UNSPECIFIED LATERALITY: ICD-10-CM

## 2018-03-29 LAB
ALBUMIN SERPL BCP-MCNC: 3.7 G/DL
ALP SERPL-CCNC: 39 U/L
ALT SERPL W/O P-5'-P-CCNC: 24 U/L
ANION GAP SERPL CALC-SCNC: 8 MMOL/L
AST SERPL-CCNC: 18 U/L
BASOPHILS # BLD AUTO: 0 K/UL
BASOPHILS NFR BLD: 0 %
BILIRUB SERPL-MCNC: 0.6 MG/DL
BUN SERPL-MCNC: 22 MG/DL
CALCIUM SERPL-MCNC: 9.4 MG/DL
CHLORIDE SERPL-SCNC: 104 MMOL/L
CO2 SERPL-SCNC: 28 MMOL/L
CREAT SERPL-MCNC: 0.9 MG/DL
DIFFERENTIAL METHOD: ABNORMAL
EOSINOPHIL # BLD AUTO: 0 K/UL
EOSINOPHIL NFR BLD: 0.2 %
ERYTHROCYTE [DISTWIDTH] IN BLOOD BY AUTOMATED COUNT: 13.6 %
EST. GFR  (AFRICAN AMERICAN): >60 ML/MIN/1.73 M^2
EST. GFR  (NON AFRICAN AMERICAN): >60 ML/MIN/1.73 M^2
GLUCOSE SERPL-MCNC: 122 MG/DL
HCT VFR BLD AUTO: 41.1 %
HGB BLD-MCNC: 13.3 G/DL
LYMPHOCYTES # BLD AUTO: 1.6 K/UL
LYMPHOCYTES NFR BLD: 39.9 %
MCH RBC QN AUTO: 29 PG
MCHC RBC AUTO-ENTMCNC: 32.4 G/DL
MCV RBC AUTO: 90 FL
MONOCYTES # BLD AUTO: 0.1 K/UL
MONOCYTES NFR BLD: 1.7 %
NEUTROPHILS # BLD AUTO: 2.4 K/UL
NEUTROPHILS NFR BLD: 58.2 %
PLATELET # BLD AUTO: 148 K/UL
PMV BLD AUTO: 9.6 FL
POTASSIUM SERPL-SCNC: 4 MMOL/L
PROT SERPL-MCNC: 6.5 G/DL
RBC # BLD AUTO: 4.59 M/UL
SODIUM SERPL-SCNC: 140 MMOL/L
WBC # BLD AUTO: 4.09 K/UL

## 2018-03-29 PROCEDURE — 80053 COMPREHEN METABOLIC PANEL: CPT

## 2018-03-29 PROCEDURE — 36415 COLL VENOUS BLD VENIPUNCTURE: CPT

## 2018-03-29 PROCEDURE — 99999 PR PBB SHADOW E&M-EST. PATIENT-LVL IV: CPT | Mod: PBBFAC,,, | Performed by: INTERNAL MEDICINE

## 2018-03-29 PROCEDURE — 85025 COMPLETE CBC W/AUTO DIFF WBC: CPT

## 2018-03-29 PROCEDURE — 99499 UNLISTED E&M SERVICE: CPT | Mod: S$GLB,,, | Performed by: INTERNAL MEDICINE

## 2018-03-29 PROCEDURE — 99214 OFFICE O/P EST MOD 30 MIN: CPT | Mod: S$GLB,,, | Performed by: INTERNAL MEDICINE

## 2018-03-29 RX ORDER — DIPHENHYDRAMINE HYDROCHLORIDE 50 MG/ML
50 INJECTION INTRAMUSCULAR; INTRAVENOUS ONCE AS NEEDED
Status: CANCELLED | OUTPATIENT
Start: 2018-04-16 | End: 2018-04-17

## 2018-03-29 RX ORDER — SODIUM CHLORIDE 0.9 % (FLUSH) 0.9 %
10 SYRINGE (ML) INJECTION
Status: CANCELLED | OUTPATIENT
Start: 2018-04-02

## 2018-03-29 RX ORDER — SODIUM CHLORIDE 0.9 % (FLUSH) 0.9 %
10 SYRINGE (ML) INJECTION
Status: CANCELLED | OUTPATIENT
Start: 2018-04-16

## 2018-03-29 RX ORDER — DIPHENHYDRAMINE HYDROCHLORIDE 50 MG/ML
50 INJECTION INTRAMUSCULAR; INTRAVENOUS ONCE AS NEEDED
Status: CANCELLED | OUTPATIENT
Start: 2018-04-09 | End: 2018-04-10

## 2018-03-29 RX ORDER — EPINEPHRINE 0.3 MG/.3ML
0.3 INJECTION SUBCUTANEOUS ONCE AS NEEDED
Status: CANCELLED | OUTPATIENT
Start: 2018-04-16 | End: 2018-04-17

## 2018-03-29 RX ORDER — HEPARIN 100 UNIT/ML
500 SYRINGE INTRAVENOUS
Status: CANCELLED | OUTPATIENT
Start: 2018-04-16

## 2018-03-29 RX ORDER — HEPARIN 100 UNIT/ML
500 SYRINGE INTRAVENOUS
Status: CANCELLED | OUTPATIENT
Start: 2018-04-02

## 2018-03-29 RX ORDER — FAMOTIDINE 10 MG/ML
20 INJECTION INTRAVENOUS
Status: CANCELLED | OUTPATIENT
Start: 2018-04-09

## 2018-03-29 RX ORDER — HEPARIN 100 UNIT/ML
500 SYRINGE INTRAVENOUS
Status: CANCELLED | OUTPATIENT
Start: 2018-04-09

## 2018-03-29 RX ORDER — SODIUM CHLORIDE 0.9 % (FLUSH) 0.9 %
10 SYRINGE (ML) INJECTION
Status: CANCELLED | OUTPATIENT
Start: 2018-04-09

## 2018-03-29 RX ORDER — EPINEPHRINE 0.3 MG/.3ML
0.3 INJECTION SUBCUTANEOUS ONCE AS NEEDED
Status: CANCELLED | OUTPATIENT
Start: 2018-04-02 | End: 2018-04-03

## 2018-03-29 RX ORDER — FAMOTIDINE 10 MG/ML
20 INJECTION INTRAVENOUS
Status: CANCELLED | OUTPATIENT
Start: 2018-04-02

## 2018-03-29 RX ORDER — FAMOTIDINE 10 MG/ML
20 INJECTION INTRAVENOUS
Status: CANCELLED | OUTPATIENT
Start: 2018-04-16

## 2018-03-29 RX ORDER — EPINEPHRINE 0.3 MG/.3ML
0.3 INJECTION SUBCUTANEOUS ONCE AS NEEDED
Status: CANCELLED | OUTPATIENT
Start: 2018-04-09 | End: 2018-04-10

## 2018-03-29 RX ORDER — DIPHENHYDRAMINE HYDROCHLORIDE 50 MG/ML
50 INJECTION INTRAMUSCULAR; INTRAVENOUS ONCE AS NEEDED
Status: CANCELLED | OUTPATIENT
Start: 2018-04-02 | End: 2018-04-03

## 2018-03-29 NOTE — PROGRESS NOTES
Subjective:       Patient ID: Ade Castellano is a 72 y.o. female.    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    Previously lost to follow-up     Remote hx of cervical CA 2004 - s/p cone bx        Pt hospitalized 11/2017   Ms. Castellano was admitted for acute on chronic respiratory failure requiring intubation and ventilation. Has large lung mass in right lung with probable post obstructive pneumonia resulting in COPD exacerbation. But also, patient had ran out of home O2 prior to onset of symptoms, likely contributing to presentation. Initiated on broad spectrum abx, IV steroids, duo-nebs and pulmonology consulted for ventilator co-management. Successfully extubated to BiPAP on 11/30. Then de-escalated to low flow nasal cannula. Abx tailored to augmentin for broad coverage, including anaerobic bacteria /     CTA chest 11/29/2017 reveals   Large right hilar mass with involvement of adjacent segmental pulmonary arterial branches and bronchi with associated postobstructive atelectasis and volume loss in the right middle lobe with adjacent ground glass opacity.  Findings highly concerning for underlying neoplastic process. Mediastinal and axillary adenopathy, with a right axillary lymph node measuring up to 2.0 cm in short axis diameter. No definite evidence of pulmonary thromboembolism.    She is followed by Pulm  She underwent rt axillary LN bx at outside facility- on 1/16/2018 at [facility]  Pathology revealed metastatic poorly differentiated carcinoma of unknown primary site  TTF-1 negative, napsin negative  , cytokeratin 7 positive, cytokeratin 20 negative, p63 negative, cytokeratin 5/6 focal dim positivity    She next  underwent lung bx at [facility] l1/31/2017   Pathology revealed benign lung tissue    She underwent repeat Right Lung Bx 2/14/2018 Pathology reveals Squamous cell carcinoma  PD-L1 10% low expression  EGFR NEG  ALK NEG  BRAF pending  Outside slides axillary LN specimen  requested for review/comparison to lung bx findings     She continues with mild  LOGAN -chronic   She wears 02 at night     She completed cycle 1 of carboplatin/Abraxane 3/26/2018     She reports she is less SOB  Appetite and weight stable   No fatigue  She has had hair loss  No N/V  No taste alterations  No hemoptysis   She reports occasional cough ,non-productive   She reports pain in shoulder and back resolved    No bleeding-urinary/rectal/nasal     PET/CT 1/26/2018   Intense FDG uptake within the known right infrahilar lung mass, compatible with malignancy.   Intensely hypermetabolic right axillary, retropectoral, and lower right cervical lymph nodes, compatible with metastases.  Abnormal FDG uptake along right breast skin thickening, new from prior chest CTA and mammogram.             PrevHx:She had an abnormal mammogram 6/4/2014 which  Revealed a round solid mass 6mm in rt breast.  She then underwent U/S guided core bx of rt breast mass on 6/17/2014.  Pathology revealed infiltrating ductal carcinoma Grade 3 with tumor  Present in thin-walled spaces suggestive of lymphatic spaces. Hormone  Receptor status on tumor specimen revealed ER negative 0% ,  MS negative 0% and Her 2 jose maria negative.  She subsequently underwent rt segmental mastectomy and SLN bx  On 7/11/2014. Pathology of rt breast lumpectomy revealed invasive  Ductal carcinoma with micropapillary pattern ( Invasive micropapillary  Ca) with max tumor dimension 11.5mm with suggestion of tumor in   Thin walled spaces c/w lymphovascular involvement. Surgical  Margins free of tumor, grade 3, ER/MS neg , Her 2 jose maria neg   With [facility] Lymph node negative for Neoplasm.   Pathologic staging [id number](i-)  Her  "mother was diagnosed with breast cancer in her 50's/  She has no family history of ovarian cancer.           Review of Systems   Constitutional: Negative for appetite change, fatigue, fever and unexpected weight change.   HENT: Negative for mouth sores.    Eyes: Negative for visual disturbance.   Respiratory: Positive for cough and shortness of breath (LOGAN).    Cardiovascular: Negative for chest pain.   Gastrointestinal: Negative for abdominal pain and diarrhea.   Genitourinary: Negative for frequency.   Musculoskeletal: Positive for arthralgias and back pain.   Skin: Negative for rash.   Neurological: Negative for headaches.   Hematological: Negative for adenopathy.   Psychiatric/Behavioral: The patient is not nervous/anxious.        Objective:        Vitals:    03/29/18 1307   Pulse: (!) 58   Temp: 97.7 °F (36.5 °C)   TempSrc: Oral   SpO2: 95%   Weight: 78 kg (171 lb 15.3 oz)   Height: 5' 3" (1.6 m)         Physical Exam   Constitutional: She is oriented to person, place, and time. She appears well-developed and well-nourished.   HENT:   Head: Normocephalic.   Mouth/Throat: Oropharynx is clear and moist. No oropharyngeal exudate.   Eyes: Conjunctivae and lids are normal. Pupils are equal, round, and reactive to light. No scleral icterus.   Neck: Normal range of motion. Neck supple. No thyromegaly present.   Cardiovascular: Normal rate, regular rhythm and normal heart sounds.    No murmur heard.  Pulmonary/Chest: Breath sounds normal. She has no wheezes. She has no rales. Right breast exhibits no inverted nipple, no mass, no nipple discharge and no skin change. Left breast exhibits no inverted nipple, no mass, no nipple discharge and no skin change.   Abdominal: Soft. Bowel sounds are normal. She exhibits no distension and no mass. There is no hepatosplenomegaly. There is no tenderness. There is no rebound and no guarding.   Musculoskeletal: Normal range of motion. She exhibits no edema or tenderness. "   Lymphadenopathy:     She has no cervical adenopathy.     She has axillary adenopathy (diminished).        Right: No supraclavicular adenopathy present.        Left: No supraclavicular adenopathy present.   Neurological: She is alert and oriented to person, place, and time. No cranial nerve deficit. Coordination normal.   Skin: Skin is warm and dry. No ecchymosis, no petechiae and no rash noted. No erythema.   Psychiatric: She has a normal mood and affect.     Mammo 6/12/2017   Impression:  No mammographic evidence of malignancy.     BI-RADS Category 1: Negative  Mammo 6/12/2017   Impression:  No mammographic evidence of malignancy.     BI-RADS Category 1: Negative      CTA chest w/contrast 11/28/2018  1. Large right hilar mass with involvement of adjacent segmental pulmonary arterial branches and bronchi with associated postobstructive atelectasis and volume loss in the right middle lobe with adjacent ground glass opacity.  Findings highly concerning for underlying neoplastic process.    2. Mediastinal and axillary adenopathy, with a right axillary lymph node measuring up to 2.0 cm in short axis diameter.    3. No definite evidence of pulmonary thromboembolism.      CT a/p w/contrast 11/29/2018   1. No acute abnormality identified within the abdomen and pelvis.    2.  There are a few nonspecific prominent lymph nodes in the upper abdomen including a periesophageal lymph node which measures 1.0 cm in short axis diameter.    3.  Significant abdominal aortic atherosclerosis and abdominal aorta ectasia.    4.  Additional findings as above.    PET/CT 1/26/2018   1.  Intense FDG uptake within the known right infrahilar lung mass, compatible with malignancy.  It is unclear if this represents primary lung malignancy or metastatic breast cancer.    2.  Intensely hypermetabolic right axillary, retropectoral, and lower right cervical lymph nodes, compatible with metastases.    3.  Abnormal FDG uptake along right breast skin  thickening, new from prior chest CTA and mammogram.  This may relate to localized edema/inflammation, though correlation with physical exam and mammography are recommended to exclude underlying inflammatory carcinoma.      MRI Brain w w/out contrast 2/9/2018  1.  No evidence of intracranial metastases.  2.  Sinus disease       Rt axillary LN bx at outside facility- on 1/16/2018 at [facility]  Pathology revealed metastatic poorly differentiated carcinoma of unknown primary  site 1/16/2018 at [facility]  TTF-1 negative, napsin negative  , cytokeratin 7 positive, cytokeratin 20 negative, p63 negative, cytokeratin 5/6 focal dim positivity    LUNG BIOPSY 1/31/2017   Pathology revealed benign lung tissue         SPECIMEN  1) Right Lung Bx 2/14/2018  Supplemental Diagnosis  Immunohistochemical stains show strong nuclear staining for p63 in essentially all tumor cells and very strong  cytoplasmic and membrane staining for CK5/6, also in essentially all tumor cells. TTF-1 and CK7 are negative  within tumor cells but do stain native pulmonary elements present within the biopsy. A stain for mucicarmine is  negative. Positive and negative controls function appropriately.  Final diagnosis: Specimen submitted as right lung biopsy  -Squamous cell carcinoma  (Electronically Signed: 2018-02-20 09:12:07 )  Diagnosed by: Phong Thompson  FINAL PATHOLOGIC DIAGNOSIS  Fragments of pulmonary parenchyma (submitted as right lung biopsy):    FINAL PATHOLOGIC DIAGNOSIS 1. Lymph node, right axilla, biopsy, review of 10 outside slides [facility], JS 18 1 133,  collected January 11, 2018: Metastatic poorly differentiated carcinoma (see comment).  The histologic section shows fibrous stroma infiltrated by nest of poorly differentiated malignant cells including  occasional dyskeratotic cells morphologically suggestive of metastatic squamous cell carcinoma.  Immunohistochemical stains are nonspecific;  the cells are positive for cytokeratin 7 and cytokeratin 5/6 (focal) and  negative for cytokeratin 20, TTF-1, p63, GCDFP, mammoglobin, and CEA    Assessment:       1. Squamous cell carcinoma of right lung    2. Encounter for chemotherapy management    3. History of breast cancer    4. Thrombocytopenia        Plan:   ECOG 1  1-4 Pt with widely metastatic squamous cell CA lung     Pt with hx of Stage 1A invasive ductal carcinoma rt breast s/p rt segmental mastectomy and SLN bx 7/11/2014 ER/ME neg HER 2 jose maria neg [id number](i-).  S/p adjuvant RT completed 9/2014 Pt declined adjuvant chemo  Follow-up Mammo 6/12/2017 No mammographic evidence of malignancy.  Pt  hospitalized 11/2017 with acute-on-chronic  resp failure  Abnormal CT imaging revealing Large right hilar mass with involvement of  adjacent segmental pulmonary arterial branches and bronchi with associated postobstructive atelectasis  and involvement of mediastinal and axillary adenopathy   S/p rt axillary LN bx ( outside facility) - metastatic poorly differentiated carcinoma of unknown primary  site  status post recent lung biopsy-benign lung tissue  PET/CT 1/26/2018   Intense FDG uptake within the known right infrahilar lung mass, compatible with malignancy.   Intensely hypermetabolic right axillary, retropectoral, and lower right cervical lymph nodes, compatible with metastases.  Abnormal FDG uptake along right breast skin thickening, new from prior chest CTA and mammogram.    Repeat lung bx reveals Squamous Cell CA lung  PD-L1 10% low expression  EGFR NEG  ALK NEG  BRAF pending    Cont with carboplatin/abraxane  Pt responding clinically -diminishing axillary LAD, improved shoulder pain   BSA 1.81     Borderline thrombocyropenia  Asymptomatic   Cont to monitor     CHEMO Mon cycle 2  CBC,CMP TODAY     Follow-up 1 month with labs     Discussed with Dr. Christopher  Greater than 35 minutes spent during this visit of which greater than 50% devoted to counseling and  coordination of care regarding diagnosis and management plan.    Cc: Swetha Katz M.D.         MD Tory Roberson MD

## 2018-04-02 ENCOUNTER — TELEPHONE (OUTPATIENT)
Dept: HEMATOLOGY/ONCOLOGY | Facility: CLINIC | Age: 73
End: 2018-04-02

## 2018-04-02 ENCOUNTER — INFUSION (OUTPATIENT)
Dept: INFUSION THERAPY | Facility: HOSPITAL | Age: 73
End: 2018-04-02
Attending: INTERNAL MEDICINE
Payer: MEDICARE

## 2018-04-02 VITALS
TEMPERATURE: 98 F | SYSTOLIC BLOOD PRESSURE: 116 MMHG | OXYGEN SATURATION: 96 % | HEART RATE: 62 BPM | DIASTOLIC BLOOD PRESSURE: 60 MMHG | RESPIRATION RATE: 18 BRPM

## 2018-04-02 DIAGNOSIS — T80.219A: Primary | ICD-10-CM

## 2018-04-02 DIAGNOSIS — C34.90 SQUAMOUS CELL CARCINOMA OF LUNG, UNSPECIFIED LATERALITY: Primary | ICD-10-CM

## 2018-04-02 PROCEDURE — S0028 INJECTION, FAMOTIDINE, 20 MG: HCPCS | Performed by: INTERNAL MEDICINE

## 2018-04-02 PROCEDURE — 25000003 PHARM REV CODE 250: Performed by: INTERNAL MEDICINE

## 2018-04-02 PROCEDURE — 63600175 PHARM REV CODE 636 W HCPCS: Mod: JG | Performed by: INTERNAL MEDICINE

## 2018-04-02 PROCEDURE — 96367 TX/PROPH/DG ADDL SEQ IV INF: CPT

## 2018-04-02 PROCEDURE — 96417 CHEMO IV INFUS EACH ADDL SEQ: CPT

## 2018-04-02 PROCEDURE — 96413 CHEMO IV INFUSION 1 HR: CPT

## 2018-04-02 RX ORDER — FAMOTIDINE 10 MG/ML
20 INJECTION INTRAVENOUS
Status: DISCONTINUED | OUTPATIENT
Start: 2018-04-02 | End: 2018-04-02

## 2018-04-02 RX ORDER — FAMOTIDINE 20 MG/50ML
20 INJECTION, SOLUTION INTRAVENOUS
Status: COMPLETED | OUTPATIENT
Start: 2018-04-02 | End: 2018-04-02

## 2018-04-02 RX ORDER — CEPHALEXIN 500 MG/1
500 CAPSULE ORAL EVERY 6 HOURS
Qty: 40 CAPSULE | Refills: 0 | Status: SHIPPED | OUTPATIENT
Start: 2018-04-02 | End: 2018-04-12

## 2018-04-02 RX ADMIN — SODIUM CHLORIDE 575 MG: 9 INJECTION, SOLUTION INTRAVENOUS at 12:04

## 2018-04-02 RX ADMIN — DIPHENHYDRAMINE HYDROCHLORIDE 50 MG: 50 INJECTION INTRAMUSCULAR; INTRAVENOUS at 10:04

## 2018-04-02 RX ADMIN — FAMOTIDINE 20 MG: 20 INJECTION, SOLUTION INTRAVENOUS at 11:04

## 2018-04-02 RX ADMIN — PACLITAXEL 190 MG: 100 INJECTION, POWDER, LYOPHILIZED, FOR SUSPENSION INTRAVENOUS at 12:04

## 2018-04-02 RX ADMIN — PALONOSETRON HYDROCHLORIDE: 0.25 INJECTION INTRAVENOUS at 11:04

## 2018-04-02 NOTE — TELEPHONE ENCOUNTER
Infusion nurses called to report pt with redness near port site, incision scabbed over, no fever.  Discussed with , will start Keflex & scheduled to see  on Thursday at [facility] office for 9:30am.

## 2018-04-02 NOTE — PLAN OF CARE
Problem: Patient Care Overview  Goal: Plan of Care Review  Outcome: Ongoing (interventions implemented as appropriate)  Arrived to unit. Port site red. Incision site red with yellow greenish scab. No drainage. Pt denies temp. Dr. Holliday notified. Will not use port. Pt to see Dr. Lima on thurs and given appt. Pt tolerated Carbo and Abraxane. No reactions noted. Pt given next appts.

## 2018-04-09 ENCOUNTER — INFUSION (OUTPATIENT)
Dept: INFUSION THERAPY | Facility: HOSPITAL | Age: 73
End: 2018-04-09
Attending: INTERNAL MEDICINE
Payer: MEDICARE

## 2018-04-09 VITALS
HEART RATE: 60 BPM | OXYGEN SATURATION: 96 % | DIASTOLIC BLOOD PRESSURE: 63 MMHG | RESPIRATION RATE: 20 BRPM | TEMPERATURE: 98 F | SYSTOLIC BLOOD PRESSURE: 119 MMHG

## 2018-04-09 DIAGNOSIS — C34.90 SQUAMOUS CELL CARCINOMA OF LUNG, UNSPECIFIED LATERALITY: Primary | ICD-10-CM

## 2018-04-09 PROCEDURE — 96413 CHEMO IV INFUSION 1 HR: CPT

## 2018-04-09 PROCEDURE — S0028 INJECTION, FAMOTIDINE, 20 MG: HCPCS | Performed by: INTERNAL MEDICINE

## 2018-04-09 PROCEDURE — 96367 TX/PROPH/DG ADDL SEQ IV INF: CPT

## 2018-04-09 PROCEDURE — A4216 STERILE WATER/SALINE, 10 ML: HCPCS | Performed by: INTERNAL MEDICINE

## 2018-04-09 PROCEDURE — 25000003 PHARM REV CODE 250: Performed by: INTERNAL MEDICINE

## 2018-04-09 PROCEDURE — 63600175 PHARM REV CODE 636 W HCPCS: Mod: JG | Performed by: INTERNAL MEDICINE

## 2018-04-09 RX ORDER — SODIUM CHLORIDE 0.9 % (FLUSH) 0.9 %
10 SYRINGE (ML) INJECTION
Status: DISCONTINUED | OUTPATIENT
Start: 2018-04-09 | End: 2018-04-09 | Stop reason: HOSPADM

## 2018-04-09 RX ORDER — FAMOTIDINE 10 MG/ML
20 INJECTION INTRAVENOUS
Status: DISCONTINUED | OUTPATIENT
Start: 2018-04-09 | End: 2018-04-09

## 2018-04-09 RX ORDER — HEPARIN 100 UNIT/ML
500 SYRINGE INTRAVENOUS
Status: DISCONTINUED | OUTPATIENT
Start: 2018-04-09 | End: 2018-04-09 | Stop reason: HOSPADM

## 2018-04-09 RX ORDER — FAMOTIDINE 20 MG/50ML
20 INJECTION, SOLUTION INTRAVENOUS
Status: COMPLETED | OUTPATIENT
Start: 2018-04-09 | End: 2018-04-09

## 2018-04-09 RX ADMIN — PALONOSETRON HYDROCHLORIDE: 0.25 INJECTION INTRAVENOUS at 09:04

## 2018-04-09 RX ADMIN — DIPHENHYDRAMINE HYDROCHLORIDE 50 MG: 50 INJECTION INTRAMUSCULAR; INTRAVENOUS at 09:04

## 2018-04-09 RX ADMIN — PACLITAXEL 190 MG: 100 INJECTION, POWDER, LYOPHILIZED, FOR SUSPENSION INTRAVENOUS at 10:04

## 2018-04-09 RX ADMIN — FAMOTIDINE 20 MG: 20 INJECTION, SOLUTION INTRAVENOUS at 09:04

## 2018-04-09 RX ADMIN — SODIUM CHLORIDE, PRESERVATIVE FREE 10 ML: 5 INJECTION INTRAVENOUS at 11:04

## 2018-04-09 RX ADMIN — HEPARIN 500 UNITS: 100 SYRINGE at 11:04

## 2018-04-09 NOTE — PLAN OF CARE
Problem: Patient Care Overview  Goal: Plan of Care Review  Outcome: Ongoing (interventions implemented as appropriate)  Patient received Abraxane. Tolerated well. No reactions noted during visit. VSS. Patient stated she has been having acid-reflux at night. She says she has been taking Tums with no relief. Instructed patient to try OTC Pepcid, Zantac, or Prilosec. Patient verbalized understanding. Received discharge instructions and ambulated unassisted of unit.

## 2018-04-13 NOTE — PROGRESS NOTES
Patient, Ade Castellano (MRN #[id number]), presented with a recent Platelet count less than 150 K/uL consistent with the definition of thrombocytopenia (ICD10 - D69.6).    Platelets   Date Value Ref Range Status   03/29/2018 148 (L) 150 - 350 K/uL Final     The patient's thrombocytopenia was monitored, evaluated, addressed and/or treated. This addendum to the medical record is made on 04/13/2018.

## 2018-04-16 ENCOUNTER — INFUSION (OUTPATIENT)
Dept: INFUSION THERAPY | Facility: HOSPITAL | Age: 73
End: 2018-04-16
Attending: INTERNAL MEDICINE
Payer: MEDICARE

## 2018-04-16 VITALS — SYSTOLIC BLOOD PRESSURE: 91 MMHG | DIASTOLIC BLOOD PRESSURE: 56 MMHG | HEART RATE: 59 BPM | RESPIRATION RATE: 17 BRPM

## 2018-04-16 DIAGNOSIS — C34.90 SQUAMOUS CELL CARCINOMA OF LUNG, UNSPECIFIED LATERALITY: Primary | ICD-10-CM

## 2018-04-16 PROCEDURE — S0028 INJECTION, FAMOTIDINE, 20 MG: HCPCS | Performed by: INTERNAL MEDICINE

## 2018-04-16 PROCEDURE — 63600175 PHARM REV CODE 636 W HCPCS: Mod: JG | Performed by: INTERNAL MEDICINE

## 2018-04-16 PROCEDURE — A4216 STERILE WATER/SALINE, 10 ML: HCPCS | Performed by: INTERNAL MEDICINE

## 2018-04-16 PROCEDURE — 96413 CHEMO IV INFUSION 1 HR: CPT

## 2018-04-16 PROCEDURE — 96367 TX/PROPH/DG ADDL SEQ IV INF: CPT

## 2018-04-16 PROCEDURE — 25000003 PHARM REV CODE 250: Performed by: INTERNAL MEDICINE

## 2018-04-16 RX ORDER — FAMOTIDINE 20 MG/50ML
20 INJECTION, SOLUTION INTRAVENOUS
Status: COMPLETED | OUTPATIENT
Start: 2018-04-16 | End: 2018-04-16

## 2018-04-16 RX ORDER — HEPARIN 100 UNIT/ML
500 SYRINGE INTRAVENOUS
Status: DISCONTINUED | OUTPATIENT
Start: 2018-04-16 | End: 2018-04-16 | Stop reason: HOSPADM

## 2018-04-16 RX ORDER — FAMOTIDINE 10 MG/ML
20 INJECTION INTRAVENOUS
Status: DISCONTINUED | OUTPATIENT
Start: 2018-04-16 | End: 2018-04-16

## 2018-04-16 RX ORDER — SODIUM CHLORIDE 0.9 % (FLUSH) 0.9 %
10 SYRINGE (ML) INJECTION
Status: DISCONTINUED | OUTPATIENT
Start: 2018-04-16 | End: 2018-04-16 | Stop reason: HOSPADM

## 2018-04-16 RX ADMIN — PALONOSETRON HYDROCHLORIDE: 0.25 INJECTION INTRAVENOUS at 08:04

## 2018-04-16 RX ADMIN — HEPARIN 500 UNITS: 100 SYRINGE at 10:04

## 2018-04-16 RX ADMIN — PACLITAXEL 190 MG: 100 INJECTION, POWDER, LYOPHILIZED, FOR SUSPENSION INTRAVENOUS at 10:04

## 2018-04-16 RX ADMIN — SODIUM CHLORIDE: 0.9 INJECTION, SOLUTION INTRAVENOUS at 09:04

## 2018-04-16 RX ADMIN — FAMOTIDINE 20 MG: 20 INJECTION, SOLUTION INTRAVENOUS at 09:04

## 2018-04-16 RX ADMIN — DIPHENHYDRAMINE HYDROCHLORIDE 50 MG: 50 INJECTION INTRAMUSCULAR; INTRAVENOUS at 08:04

## 2018-04-16 RX ADMIN — SODIUM CHLORIDE, PRESERVATIVE FREE 10 ML: 5 INJECTION INTRAVENOUS at 10:04

## 2018-04-16 NOTE — PLAN OF CARE
Problem: Patient Care Overview  Goal: Plan of Care Review  Outcome: Ongoing (interventions implemented as appropriate)  Patient received Abraxane. Tolerated well. No reactions noted during visit. VSS. Received discharge instructions and verbalized understanding.

## 2018-04-19 ENCOUNTER — HOSPITAL ENCOUNTER (OUTPATIENT)
Dept: RADIOLOGY | Facility: HOSPITAL | Age: 73
Discharge: HOME | End: 2018-04-19
Attending: INTERNAL MEDICINE
Payer: MEDICARE

## 2018-04-19 DIAGNOSIS — C34.91 SQUAMOUS CELL CARCINOMA OF RIGHT LUNG: Chronic | ICD-10-CM

## 2018-04-19 DIAGNOSIS — Z51.11 ENCOUNTER FOR CHEMOTHERAPY MANAGEMENT: ICD-10-CM

## 2018-04-19 PROCEDURE — 78815 PET IMAGE W/CT SKULL-THIGH: CPT | Mod: TC,PS

## 2018-04-19 PROCEDURE — 78815 PET IMAGE W/CT SKULL-THIGH: CPT | Mod: 26,PS,, | Performed by: RADIOLOGY

## 2018-04-19 PROCEDURE — A9552 F18 FDG: HCPCS

## 2018-04-20 RX ORDER — SODIUM CHLORIDE 0.9 % (FLUSH) 0.9 %
10 SYRINGE (ML) INJECTION
Status: CANCELLED | OUTPATIENT
Start: 2018-04-30

## 2018-04-20 RX ORDER — FAMOTIDINE 10 MG/ML
20 INJECTION INTRAVENOUS
Status: CANCELLED | OUTPATIENT
Start: 2018-05-07

## 2018-04-20 RX ORDER — SODIUM CHLORIDE 0.9 % (FLUSH) 0.9 %
10 SYRINGE (ML) INJECTION
Status: CANCELLED | OUTPATIENT
Start: 2018-05-07

## 2018-04-20 RX ORDER — HEPARIN 100 UNIT/ML
500 SYRINGE INTRAVENOUS
Status: CANCELLED | OUTPATIENT
Start: 2018-04-23

## 2018-04-20 RX ORDER — DIPHENHYDRAMINE HYDROCHLORIDE 50 MG/ML
50 INJECTION INTRAMUSCULAR; INTRAVENOUS ONCE AS NEEDED
Status: CANCELLED | OUTPATIENT
Start: 2018-04-23 | End: 2018-04-23

## 2018-04-20 RX ORDER — HEPARIN 100 UNIT/ML
500 SYRINGE INTRAVENOUS
Status: CANCELLED | OUTPATIENT
Start: 2018-05-07

## 2018-04-20 RX ORDER — SODIUM CHLORIDE 0.9 % (FLUSH) 0.9 %
10 SYRINGE (ML) INJECTION
Status: CANCELLED | OUTPATIENT
Start: 2018-04-23

## 2018-04-20 RX ORDER — DIPHENHYDRAMINE HYDROCHLORIDE 50 MG/ML
50 INJECTION INTRAMUSCULAR; INTRAVENOUS ONCE AS NEEDED
Status: CANCELLED | OUTPATIENT
Start: 2018-05-07 | End: 2018-05-07

## 2018-04-20 RX ORDER — DIPHENHYDRAMINE HYDROCHLORIDE 50 MG/ML
50 INJECTION INTRAMUSCULAR; INTRAVENOUS ONCE AS NEEDED
Status: CANCELLED | OUTPATIENT
Start: 2018-04-30 | End: 2018-04-30

## 2018-04-20 RX ORDER — HEPARIN 100 UNIT/ML
500 SYRINGE INTRAVENOUS
Status: CANCELLED | OUTPATIENT
Start: 2018-04-30

## 2018-04-20 RX ORDER — FAMOTIDINE 10 MG/ML
20 INJECTION INTRAVENOUS
Status: CANCELLED | OUTPATIENT
Start: 2018-04-30

## 2018-04-20 RX ORDER — EPINEPHRINE 0.3 MG/.3ML
0.3 INJECTION SUBCUTANEOUS ONCE AS NEEDED
Status: CANCELLED | OUTPATIENT
Start: 2018-04-30 | End: 2018-04-30

## 2018-04-20 RX ORDER — EPINEPHRINE 0.3 MG/.3ML
0.3 INJECTION SUBCUTANEOUS ONCE AS NEEDED
Status: CANCELLED | OUTPATIENT
Start: 2018-04-23 | End: 2018-04-23

## 2018-04-20 RX ORDER — FAMOTIDINE 10 MG/ML
20 INJECTION INTRAVENOUS
Status: CANCELLED | OUTPATIENT
Start: 2018-04-23

## 2018-04-20 RX ORDER — EPINEPHRINE 0.3 MG/.3ML
0.3 INJECTION SUBCUTANEOUS ONCE AS NEEDED
Status: CANCELLED | OUTPATIENT
Start: 2018-05-07 | End: 2018-05-07

## 2018-04-23 ENCOUNTER — INFUSION (OUTPATIENT)
Dept: INFUSION THERAPY | Facility: HOSPITAL | Age: 73
End: 2018-04-23
Attending: INTERNAL MEDICINE
Payer: MEDICARE

## 2018-04-23 VITALS
SYSTOLIC BLOOD PRESSURE: 133 MMHG | DIASTOLIC BLOOD PRESSURE: 72 MMHG | HEART RATE: 59 BPM | RESPIRATION RATE: 18 BRPM | OXYGEN SATURATION: 97 % | TEMPERATURE: 98 F

## 2018-04-23 DIAGNOSIS — C34.90 SQUAMOUS CELL CARCINOMA OF LUNG, UNSPECIFIED LATERALITY: Primary | ICD-10-CM

## 2018-04-23 DIAGNOSIS — C34.90 LUNG CANCER: ICD-10-CM

## 2018-04-23 PROCEDURE — 96413 CHEMO IV INFUSION 1 HR: CPT

## 2018-04-23 PROCEDURE — 96361 HYDRATE IV INFUSION ADD-ON: CPT

## 2018-04-23 PROCEDURE — 63600175 PHARM REV CODE 636 W HCPCS: Performed by: INTERNAL MEDICINE

## 2018-04-23 PROCEDURE — A4216 STERILE WATER/SALINE, 10 ML: HCPCS | Performed by: INTERNAL MEDICINE

## 2018-04-23 PROCEDURE — 96367 TX/PROPH/DG ADDL SEQ IV INF: CPT

## 2018-04-23 PROCEDURE — 96417 CHEMO IV INFUS EACH ADDL SEQ: CPT

## 2018-04-23 PROCEDURE — 25000003 PHARM REV CODE 250: Performed by: INTERNAL MEDICINE

## 2018-04-23 PROCEDURE — S0028 INJECTION, FAMOTIDINE, 20 MG: HCPCS | Performed by: INTERNAL MEDICINE

## 2018-04-23 RX ORDER — FAMOTIDINE 10 MG/ML
20 INJECTION INTRAVENOUS
Status: DISCONTINUED | OUTPATIENT
Start: 2018-04-23 | End: 2018-04-23

## 2018-04-23 RX ORDER — FAMOTIDINE 20 MG/50ML
20 INJECTION, SOLUTION INTRAVENOUS
Status: COMPLETED | OUTPATIENT
Start: 2018-04-23 | End: 2018-04-23

## 2018-04-23 RX ORDER — HEPARIN 100 UNIT/ML
500 SYRINGE INTRAVENOUS
Status: DISCONTINUED | OUTPATIENT
Start: 2018-04-23 | End: 2018-04-23 | Stop reason: HOSPADM

## 2018-04-23 RX ORDER — DEXAMETHASONE 4 MG/1
20 TABLET ORAL SEE ADMIN INSTRUCTIONS
Qty: 60 TABLET | Refills: 0 | Status: SHIPPED | OUTPATIENT
Start: 2018-04-23 | End: 2018-08-23

## 2018-04-23 RX ORDER — SODIUM CHLORIDE 0.9 % (FLUSH) 0.9 %
10 SYRINGE (ML) INJECTION
Status: DISCONTINUED | OUTPATIENT
Start: 2018-04-23 | End: 2018-04-23 | Stop reason: HOSPADM

## 2018-04-23 RX ADMIN — PALONOSETRON HYDROCHLORIDE: 0.25 INJECTION INTRAVENOUS at 09:04

## 2018-04-23 RX ADMIN — HEPARIN 500 UNITS: 100 SYRINGE at 11:04

## 2018-04-23 RX ADMIN — SODIUM CHLORIDE 630 MG: 9 INJECTION, SOLUTION INTRAVENOUS at 10:04

## 2018-04-23 RX ADMIN — PACLITAXEL 190 MG: 100 INJECTION, POWDER, LYOPHILIZED, FOR SUSPENSION INTRAVENOUS at 10:04

## 2018-04-23 RX ADMIN — SODIUM CHLORIDE: 0.9 INJECTION, SOLUTION INTRAVENOUS at 11:04

## 2018-04-23 RX ADMIN — DIPHENHYDRAMINE HYDROCHLORIDE 50 MG: 50 INJECTION INTRAMUSCULAR; INTRAVENOUS at 09:04

## 2018-04-23 RX ADMIN — FAMOTIDINE 20 MG: 20 INJECTION, SOLUTION INTRAVENOUS at 10:04

## 2018-04-23 RX ADMIN — SODIUM CHLORIDE, PRESERVATIVE FREE 10 ML: 5 INJECTION INTRAVENOUS at 11:04

## 2018-04-24 ENCOUNTER — OFFICE VISIT (OUTPATIENT)
Dept: HEMATOLOGY/ONCOLOGY | Facility: CLINIC | Age: 73
End: 2018-04-24
Payer: MEDICARE

## 2018-04-24 VITALS
SYSTOLIC BLOOD PRESSURE: 115 MMHG | BODY MASS INDEX: 30.41 KG/M2 | HEIGHT: 63 IN | OXYGEN SATURATION: 96 % | WEIGHT: 171.63 LBS | TEMPERATURE: 98 F | HEART RATE: 68 BPM | DIASTOLIC BLOOD PRESSURE: 65 MMHG

## 2018-04-24 DIAGNOSIS — J44.9 CHRONIC OBSTRUCTIVE PULMONARY DISEASE, UNSPECIFIED COPD TYPE: Chronic | ICD-10-CM

## 2018-04-24 DIAGNOSIS — Z51.11 ENCOUNTER FOR CHEMOTHERAPY MANAGEMENT: ICD-10-CM

## 2018-04-24 DIAGNOSIS — Z85.3 HISTORY OF BREAST CANCER: ICD-10-CM

## 2018-04-24 DIAGNOSIS — C34.91 SQUAMOUS CELL CARCINOMA OF RIGHT LUNG: Primary | Chronic | ICD-10-CM

## 2018-04-24 PROCEDURE — 99499 UNLISTED E&M SERVICE: CPT | Mod: S$GLB,,, | Performed by: INTERNAL MEDICINE

## 2018-04-24 PROCEDURE — 99214 OFFICE O/P EST MOD 30 MIN: CPT | Mod: S$GLB,,, | Performed by: INTERNAL MEDICINE

## 2018-04-24 PROCEDURE — 99999 PR PBB SHADOW E&M-EST. PATIENT-LVL IV: CPT | Mod: PBBFAC,,, | Performed by: INTERNAL MEDICINE

## 2018-04-24 NOTE — PROGRESS NOTES
Subjective:       Patient ID: Ade Castellano is a 72 y.o. female.    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    Previously lost to follow-up     Remote hx of cervical CA 2004 - s/p cone bx        Pt hospitalized 11/2017   Ms. Castellano was admitted for acute on chronic respiratory failure requiring intubation and ventilation. Has large lung mass in right lung with probable post obstructive pneumonia resulting in COPD exacerbation. But also, patient had ran out of home O2 prior to onset of symptoms, likely contributing to presentation. Initiated on broad spectrum abx, IV steroids, duo-nebs and pulmonology consulted for ventilator co-management. Successfully extubated to BiPAP on 11/30. Then de-escalated to low flow nasal cannula. Abx tailored to augmentin for broad coverage, including anaerobic bacteria /     CTA chest 11/29/2017 reveals   Large right hilar mass with involvement of adjacent segmental pulmonary arterial branches and bronchi with associated postobstructive atelectasis and volume loss in the right middle lobe with adjacent ground glass opacity.  Findings highly concerning for underlying neoplastic process. Mediastinal and axillary adenopathy, with a right axillary lymph node measuring up to 2.0 cm in short axis diameter. No definite evidence of pulmonary thromboembolism.    She is followed by Pulm  She underwent rt axillary LN bx at outside facility- on 1/16/2018 at [facility]  Pathology revealed metastatic poorly differentiated carcinoma of unknown primary site  TTF-1 negative, napsin negative  , cytokeratin 7 positive, cytokeratin 20 negative, p63 negative, cytokeratin 5/6 focal dim positivity    She next  underwent lung bx at [facility] l1/31/2017   Pathology revealed benign lung tissue    She underwent repeat Right Lung Bx 2/14/2018 Pathology reveals Squamous cell carcinoma  PD-L1 10% low expression  EGFR NEG  ALK NEG  BRAF pending  Outside slides axillary LN specimen  requested for review/comparison to lung bx findings     She continues with mild  LOGAN -chronic   She wears 02 at night     She completed cycle 2 of carboplatin/Abraxane 4/15/2018     She reports she is feeling much better since starting chemo   Less SOB  Appetite and weight continues to improve   No fatigue  No N/V  No taste alterations  No hemoptysis   She reports occasional cough ,non-productive   She reports pain in shoulder and back resolved  She is no longer taking pain meds  Swelling lt arm resolved    No bleeding-urinary/rectal/nasal     PET/CT  4/19/2018 reveals there has been a excellent response to therapy.  At least 90% reduction in malignant activity.           PrevHx:She had an abnormal mammogram 6/4/2014 which  Revealed a round solid mass 6mm in rt breast.  She then underwent U/S guided core bx of rt breast mass on 6/17/2014.  Pathology revealed infiltrating ductal carcinoma Grade 3 with tumor  Present in thin-walled spaces suggestive of lymphatic spaces. Hormone  Receptor status on tumor specimen revealed ER negative 0% ,  DC negative 0% and Her 2 jose maria negative.  She subsequently underwent rt segmental mastectomy and SLN bx  On 7/11/2014. Pathology of rt breast lumpectomy revealed invasive  Ductal carcinoma with micropapillary pattern ( Invasive micropapillary  Ca) with max tumor dimension 11.5mm with suggestion of tumor in   Thin walled spaces c/w lymphovascular involvement. Surgical  Margins free of tumor, grade 3, ER/DC neg , Her 2 jose maria neg   With [place] Lymph node negative for Neoplasm.   Pathologic staging [id number](i-)  Her mother was diagnosed with breast cancer in her 50's/  She has no family history of ovarian cancer.           Review  "of Systems   Constitutional: Positive for activity change. Negative for appetite change, fatigue, fever and unexpected weight change.   HENT: Positive for rhinorrhea. Negative for mouth sores.    Eyes: Negative for visual disturbance.   Respiratory: Positive for cough (improved) and shortness of breath (LOGAN significantly improved).    Cardiovascular: Negative for chest pain.   Gastrointestinal: Negative for abdominal pain and diarrhea.   Genitourinary: Negative for frequency.   Musculoskeletal: Positive for arthralgias and back pain.   Skin: Negative for rash.   Neurological: Negative for headaches.   Hematological: Negative for adenopathy.   Psychiatric/Behavioral: The patient is not nervous/anxious.        Objective:        Vitals:    04/24/18 0947   BP: 115/65   BP Location: Left arm   Patient Position: Sitting   BP Method: Medium (Automatic)   Pulse: 68   Temp: 97.6 °F (36.4 °C)   TempSrc: Oral   SpO2: 96%   Weight: 77.9 kg (171 lb 10.1 oz)   Height: 5' 3" (1.6 m)         Physical Exam   Constitutional: She is oriented to person, place, and time. She appears well-developed and well-nourished.   HENT:   Head: Normocephalic.   Mouth/Throat: Oropharynx is clear and moist. No oropharyngeal exudate.   Eyes: Conjunctivae and lids are normal. Pupils are equal, round, and reactive to light. No scleral icterus.   Neck: Normal range of motion. Neck supple. No thyromegaly present.   Cardiovascular: Normal rate, regular rhythm and normal heart sounds.    No murmur heard.  Pulmonary/Chest: Breath sounds normal. She has no wheezes. She has no rales. Right breast exhibits no inverted nipple, no mass, no nipple discharge and no skin change. Left breast exhibits no inverted nipple, no mass, no nipple discharge and no skin change.   Abdominal: Soft. Bowel sounds are normal. She exhibits no distension and no mass. There is no hepatosplenomegaly. There is no tenderness. There is no rebound and no guarding.   Musculoskeletal: Normal " range of motion. She exhibits no edema or tenderness.   Lymphadenopathy:     She has no cervical adenopathy.     She has no axillary adenopathy.        Right: No supraclavicular adenopathy present.        Left: No supraclavicular adenopathy present.   Neurological: She is alert and oriented to person, place, and time. No cranial nerve deficit. Coordination normal.   Skin: Skin is warm and dry. No ecchymosis, no petechiae and no rash noted. No erythema.   Psychiatric: She has a normal mood and affect.     Mammo 6/12/2017   Impression:  No mammographic evidence of malignancy.     BI-RADS Category 1: Negative  Mammo 6/12/2017   Impression:  No mammographic evidence of malignancy.     BI-RADS Category 1: Negative      CTA chest w/contrast 11/28/2018  1. Large right hilar mass with involvement of adjacent segmental pulmonary arterial branches and bronchi with associated postobstructive atelectasis and volume loss in the right middle lobe with adjacent ground glass opacity.  Findings highly concerning for underlying neoplastic process.    2. Mediastinal and axillary adenopathy, with a right axillary lymph node measuring up to 2.0 cm in short axis diameter.    3. No definite evidence of pulmonary thromboembolism.      CT a/p w/contrast 11/29/2018   1. No acute abnormality identified within the abdomen and pelvis.    2.  There are a few nonspecific prominent lymph nodes in the upper abdomen including a periesophageal lymph node which measures 1.0 cm in short axis diameter.    3.  Significant abdominal aortic atherosclerosis and abdominal aorta ectasia.    4.  Additional findings as above.    PET/CT 1/26/2018   1.  Intense FDG uptake within the known right infrahilar lung mass, compatible with malignancy.  It is unclear if this represents primary lung malignancy or metastatic breast cancer.    2.  Intensely hypermetabolic right axillary, retropectoral, and lower right cervical lymph nodes, compatible with metastases.    3.   Abnormal FDG uptake along right breast skin thickening, new from prior chest CTA and mammogram.  This may relate to localized edema/inflammation, though correlation with physical exam and mammography are recommended to exclude underlying inflammatory carcinoma.      MRI Brain w w/out contrast 2/9/2018  1.  No evidence of intracranial metastases.  2.  Sinus disease       Rt axillary LN bx at outside facility- on 1/16/2018 at [facility]  Pathology revealed metastatic poorly differentiated carcinoma of unknown primary  site 1/16/2018 at [facility]  TTF-1 negative, napsin negative  , cytokeratin 7 positive, cytokeratin 20 negative, p63 negative, cytokeratin 5/6 focal dim positivity    LUNG BIOPSY 1/31/2017   Pathology revealed benign lung tissue         SPECIMEN  1) Right Lung Bx 2/14/2018  Supplemental Diagnosis  Immunohistochemical stains show strong nuclear staining for p63 in essentially all tumor cells and very strong  cytoplasmic and membrane staining for CK5/6, also in essentially all tumor cells. TTF-1 and CK7 are negative  within tumor cells but do stain native pulmonary elements present within the biopsy. A stain for mucicarmine is  negative. Positive and negative controls function appropriately.  Final diagnosis: Specimen submitted as right lung biopsy  -Squamous cell carcinoma  (Electronically Signed: 2018-02-20 09:12:07 )  Diagnosed by: Phong Thompson  FINAL PATHOLOGIC DIAGNOSIS  Fragments of pulmonary parenchyma (submitted as right lung biopsy):    FINAL PATHOLOGIC DIAGNOSIS 1. Lymph node, right axilla, biopsy, review of 10 outside slides [facility], [facility] [phone],  collected January 11, 2018: Metastatic poorly differentiated carcinoma (see comment).  The histologic section shows fibrous stroma infiltrated by nest of poorly differentiated malignant cells including  occasional dyskeratotic cells morphologically suggestive of metastatic squamous cell  carcinoma.  Immunohistochemical stains are nonspecific; the cells are positive for cytokeratin 7 and cytokeratin 5/6 (focal) and  negative for cytokeratin 20, TTF-1, p63, GCDFP, mammoglobin, and CEA      See above    There has been a excellent response to therapy.  At least 90% reduction in malignant activity    Assessment:       1. Squamous cell carcinoma of right lung    2. Encounter for chemotherapy management    3. History of breast cancer    4. Chronic obstructive pulmonary disease, unspecified COPD type        Plan:   ECOG 1  1-4 Pt with widely metastatic squamous cell CA lung     Pt with hx of Stage 1A invasive ductal carcinoma rt breast s/p rt segmental mastectomy and SLN bx 7/11/2014 ER/IL neg HER 2 jose maria neg [id number](i-).  S/p adjuvant RT completed 9/2014 Pt declined adjuvant chemo  Follow-up Mammo 6/12/2017 No mammographic evidence of malignancy.  Pt  hospitalized 11/2017 with acute-on-chronic  resp failure  Abnormal CT imaging revealing Large right hilar mass with involvement of  adjacent segmental pulmonary arterial branches and bronchi with associated postobstructive atelectasis  and involvement of mediastinal and axillary adenopathy   S/p rt axillary LN bx ( outside facility) - metastatic poorly differentiated carcinoma of unknown primary  site  status post recent lung biopsy-benign lung tissue  PET/CT 1/26/2018   Intense FDG uptake within the known right infrahilar lung mass, compatible with malignancy.   Intensely hypermetabolic right axillary, retropectoral, and lower right cervical lymph nodes, compatible with metastases.  Abnormal FDG uptake along right breast skin thickening, new from prior chest CTA and mammogram.    Repeat lung bx reveals Squamous Cell CA lung  PD-L1 10% low expression  EGFR NEG  ALK NEG  BRAF pending    Cont with carboplatin/abraxane  Pt responding clinically and radiographically with near complete response!    Discussed radiographic findings in detail w/pt     Proceed with  cycle 3 of chemo     Follow-up 1 month with cbc,cmp prior to f/u       Cc: Swetha Katz M.D.         MD Tory Roberson MD

## 2018-04-30 ENCOUNTER — INFUSION (OUTPATIENT)
Dept: INFUSION THERAPY | Facility: HOSPITAL | Age: 73
End: 2018-04-30
Attending: INTERNAL MEDICINE
Payer: MEDICARE

## 2018-04-30 VITALS
DIASTOLIC BLOOD PRESSURE: 75 MMHG | OXYGEN SATURATION: 96 % | RESPIRATION RATE: 18 BRPM | HEART RATE: 59 BPM | TEMPERATURE: 98 F | SYSTOLIC BLOOD PRESSURE: 135 MMHG

## 2018-04-30 DIAGNOSIS — C34.90 SQUAMOUS CELL CARCINOMA OF LUNG, UNSPECIFIED LATERALITY: Primary | ICD-10-CM

## 2018-04-30 PROCEDURE — 63600175 PHARM REV CODE 636 W HCPCS: Performed by: INTERNAL MEDICINE

## 2018-04-30 PROCEDURE — A4216 STERILE WATER/SALINE, 10 ML: HCPCS | Performed by: INTERNAL MEDICINE

## 2018-04-30 PROCEDURE — S0028 INJECTION, FAMOTIDINE, 20 MG: HCPCS | Performed by: INTERNAL MEDICINE

## 2018-04-30 PROCEDURE — 96367 TX/PROPH/DG ADDL SEQ IV INF: CPT

## 2018-04-30 PROCEDURE — 96413 CHEMO IV INFUSION 1 HR: CPT

## 2018-04-30 PROCEDURE — 25000003 PHARM REV CODE 250: Performed by: INTERNAL MEDICINE

## 2018-04-30 RX ORDER — HEPARIN 100 UNIT/ML
500 SYRINGE INTRAVENOUS
Status: DISCONTINUED | OUTPATIENT
Start: 2018-04-30 | End: 2018-04-30 | Stop reason: HOSPADM

## 2018-04-30 RX ORDER — SODIUM CHLORIDE 0.9 % (FLUSH) 0.9 %
10 SYRINGE (ML) INJECTION
Status: DISCONTINUED | OUTPATIENT
Start: 2018-04-30 | End: 2018-04-30 | Stop reason: HOSPADM

## 2018-04-30 RX ORDER — FAMOTIDINE 20 MG/50ML
20 INJECTION, SOLUTION INTRAVENOUS
Status: COMPLETED | OUTPATIENT
Start: 2018-04-30 | End: 2018-04-30

## 2018-04-30 RX ORDER — FAMOTIDINE 10 MG/ML
20 INJECTION INTRAVENOUS
Status: DISCONTINUED | OUTPATIENT
Start: 2018-04-30 | End: 2018-04-30

## 2018-04-30 RX ADMIN — FAMOTIDINE 20 MG: 20 INJECTION, SOLUTION INTRAVENOUS at 09:04

## 2018-04-30 RX ADMIN — HEPARIN 500 UNITS: 100 SYRINGE at 11:04

## 2018-04-30 RX ADMIN — PALONOSETRON HYDROCHLORIDE: 0.25 INJECTION INTRAVENOUS at 09:04

## 2018-04-30 RX ADMIN — PACLITAXEL 190 MG: 100 INJECTION, POWDER, LYOPHILIZED, FOR SUSPENSION INTRAVENOUS at 10:04

## 2018-04-30 RX ADMIN — SODIUM CHLORIDE, PRESERVATIVE FREE 10 ML: 5 INJECTION INTRAVENOUS at 11:04

## 2018-04-30 RX ADMIN — DIPHENHYDRAMINE HYDROCHLORIDE 50 MG: 50 INJECTION INTRAMUSCULAR; INTRAVENOUS at 08:04

## 2018-04-30 NOTE — PLAN OF CARE
Problem: Patient Care Overview  Goal: Plan of Care Review  Outcome: Ongoing (interventions implemented as appropriate)  Tolerated Abraxane. No complaints voiced. Discharged from unit.

## 2018-05-07 ENCOUNTER — INFUSION (OUTPATIENT)
Dept: INFUSION THERAPY | Facility: HOSPITAL | Age: 73
End: 2018-05-07
Attending: INTERNAL MEDICINE
Payer: MEDICARE

## 2018-05-07 ENCOUNTER — OFFICE VISIT (OUTPATIENT)
Dept: HEMATOLOGY/ONCOLOGY | Facility: CLINIC | Age: 73
End: 2018-05-07
Payer: MEDICARE

## 2018-05-07 VITALS
HEART RATE: 65 BPM | TEMPERATURE: 98 F | BODY MASS INDEX: 30.39 KG/M2 | OXYGEN SATURATION: 95 % | HEIGHT: 63 IN | SYSTOLIC BLOOD PRESSURE: 126 MMHG | WEIGHT: 171.5 LBS | DIASTOLIC BLOOD PRESSURE: 80 MMHG

## 2018-05-07 VITALS
TEMPERATURE: 98 F | HEART RATE: 63 BPM | SYSTOLIC BLOOD PRESSURE: 124 MMHG | RESPIRATION RATE: 18 BRPM | OXYGEN SATURATION: 96 % | DIASTOLIC BLOOD PRESSURE: 61 MMHG

## 2018-05-07 DIAGNOSIS — Z85.3 HISTORY OF BREAST CANCER: ICD-10-CM

## 2018-05-07 DIAGNOSIS — T78.40XA ALLERGIC REACTION, INITIAL ENCOUNTER: Primary | ICD-10-CM

## 2018-05-07 DIAGNOSIS — C34.90 SQUAMOUS CELL CARCINOMA OF LUNG, UNSPECIFIED LATERALITY: Primary | ICD-10-CM

## 2018-05-07 DIAGNOSIS — Z51.11 ENCOUNTER FOR CHEMOTHERAPY MANAGEMENT: ICD-10-CM

## 2018-05-07 DIAGNOSIS — C34.91 SQUAMOUS CELL CARCINOMA OF RIGHT LUNG: ICD-10-CM

## 2018-05-07 LAB
ALBUMIN SERPL BCP-MCNC: 3.7 G/DL
ALP SERPL-CCNC: 48 U/L
ALT SERPL W/O P-5'-P-CCNC: 21 U/L
ANION GAP SERPL CALC-SCNC: 7 MMOL/L
AST SERPL-CCNC: 16 U/L
BILIRUB SERPL-MCNC: 0.5 MG/DL
BUN SERPL-MCNC: 24 MG/DL
CALCIUM SERPL-MCNC: 9.3 MG/DL
CHLORIDE SERPL-SCNC: 102 MMOL/L
CO2 SERPL-SCNC: 27 MMOL/L
CREAT SERPL-MCNC: 0.8 MG/DL
ERYTHROCYTE [DISTWIDTH] IN BLOOD BY AUTOMATED COUNT: 14.5 %
EST. GFR  (AFRICAN AMERICAN): >60 ML/MIN/1.73 M^2
EST. GFR  (NON AFRICAN AMERICAN): >60 ML/MIN/1.73 M^2
GLUCOSE SERPL-MCNC: 212 MG/DL
HCT VFR BLD AUTO: 32.5 %
HGB BLD-MCNC: 10.9 G/DL
MCH RBC QN AUTO: 29.5 PG
MCHC RBC AUTO-ENTMCNC: 33.5 G/DL
MCV RBC AUTO: 88 FL
NEUTROPHILS # BLD AUTO: 6 K/UL
PLATELET # BLD AUTO: 230 K/UL
PMV BLD AUTO: 9.2 FL
POTASSIUM SERPL-SCNC: 4.8 MMOL/L
PROT SERPL-MCNC: 6.6 G/DL
RBC # BLD AUTO: 3.69 M/UL
SODIUM SERPL-SCNC: 136 MMOL/L
WBC # BLD AUTO: 7.46 K/UL

## 2018-05-07 PROCEDURE — 96367 TX/PROPH/DG ADDL SEQ IV INF: CPT

## 2018-05-07 PROCEDURE — 25000003 PHARM REV CODE 250: Performed by: INTERNAL MEDICINE

## 2018-05-07 PROCEDURE — S0028 INJECTION, FAMOTIDINE, 20 MG: HCPCS | Performed by: INTERNAL MEDICINE

## 2018-05-07 PROCEDURE — 99999 PR PBB SHADOW E&M-EST. PATIENT-LVL III: CPT | Mod: PBBFAC,,, | Performed by: INTERNAL MEDICINE

## 2018-05-07 PROCEDURE — 96413 CHEMO IV INFUSION 1 HR: CPT

## 2018-05-07 PROCEDURE — A4216 STERILE WATER/SALINE, 10 ML: HCPCS | Performed by: INTERNAL MEDICINE

## 2018-05-07 PROCEDURE — 99214 OFFICE O/P EST MOD 30 MIN: CPT | Mod: S$GLB,,, | Performed by: INTERNAL MEDICINE

## 2018-05-07 PROCEDURE — 80053 COMPREHEN METABOLIC PANEL: CPT

## 2018-05-07 PROCEDURE — 85027 COMPLETE CBC AUTOMATED: CPT

## 2018-05-07 PROCEDURE — 99499 UNLISTED E&M SERVICE: CPT | Mod: S$GLB,,, | Performed by: INTERNAL MEDICINE

## 2018-05-07 PROCEDURE — 63600175 PHARM REV CODE 636 W HCPCS: Performed by: INTERNAL MEDICINE

## 2018-05-07 RX ORDER — HEPARIN 100 UNIT/ML
500 SYRINGE INTRAVENOUS
Status: DISCONTINUED | OUTPATIENT
Start: 2018-05-07 | End: 2018-05-07 | Stop reason: HOSPADM

## 2018-05-07 RX ORDER — FAMOTIDINE 20 MG/50ML
20 INJECTION, SOLUTION INTRAVENOUS
Status: COMPLETED | OUTPATIENT
Start: 2018-05-07 | End: 2018-05-07

## 2018-05-07 RX ORDER — METHYLPREDNISOLONE 4 MG/1
4 TABLET ORAL DAILY
Qty: 1 PACKAGE | Refills: 0 | Status: SHIPPED | OUTPATIENT
Start: 2018-05-07 | End: 2018-05-13

## 2018-05-07 RX ORDER — SODIUM CHLORIDE 0.9 % (FLUSH) 0.9 %
10 SYRINGE (ML) INJECTION
Status: DISCONTINUED | OUTPATIENT
Start: 2018-05-07 | End: 2018-05-07 | Stop reason: HOSPADM

## 2018-05-07 RX ORDER — FAMOTIDINE 10 MG/ML
20 INJECTION INTRAVENOUS
Status: DISCONTINUED | OUTPATIENT
Start: 2018-05-07 | End: 2018-05-07

## 2018-05-07 RX ADMIN — PALONOSETRON HYDROCHLORIDE: 0.25 INJECTION INTRAVENOUS at 09:05

## 2018-05-07 RX ADMIN — HEPARIN 500 UNITS: 100 SYRINGE at 10:05

## 2018-05-07 RX ADMIN — SODIUM CHLORIDE: 0.9 INJECTION, SOLUTION INTRAVENOUS at 09:05

## 2018-05-07 RX ADMIN — FAMOTIDINE 20 MG: 20 INJECTION, SOLUTION INTRAVENOUS at 09:05

## 2018-05-07 RX ADMIN — PACLITAXEL 190 MG: 100 INJECTION, POWDER, LYOPHILIZED, FOR SUSPENSION INTRAVENOUS at 09:05

## 2018-05-07 RX ADMIN — SODIUM CHLORIDE, PRESERVATIVE FREE 10 ML: 5 INJECTION INTRAVENOUS at 10:05

## 2018-05-07 RX ADMIN — DIPHENHYDRAMINE HYDROCHLORIDE 50 MG: 50 INJECTION INTRAMUSCULAR; INTRAVENOUS at 08:05

## 2018-05-07 NOTE — PROGRESS NOTES
Chief Complaint :   Lung Cancer.    Hx of Present illness :  Patient is a 72 y.o. year old female who presents to the clinic today for  Oncology evaluation. Patient was in infusion center reveiving abraxane. She was experiencing pruritic rash for past one week. Experiencing shortness of breath. No fever or chills. Sinus drip +.  Blood from Right nostril in AM. On Nasal Oxygen.      Allergies :    Review of patient's allergies indicates:  No Known Allergies    Occupation :  retd  at [employer]    Transfusion :  N/A    Menstrual & obstetric Hx :  2; Para 1.  Age of menarche: 14  Age of first pregnancy: 24  Lactation history: No  Age of menopause: 50  HRT: No    Present Meds :   Medication List with Changes/Refills   Current Medications    ALBUTEROL (PROVENTIL) 2.5 MG /3 ML (0.083 %) NEBULIZER SOLUTION    NEBULIZE 1 vial EVERY 6 HOURS AS NEEDED FOR WHEEZING    ALBUTEROL (VENTOLIN HFA) 90 MCG/ACTUATION INHALER    INHALE 2 PUFF(S) BY MOUTH EVERY 4 - 6 HOURS AS NEEDED FOR SHORTNESS OF BREATH or WHEEZING    ASCORBIC ACID (VITAMIN C) 500 MG TABLET    Take 500 mg by mouth once daily.    ASPIRIN (ECOTRIN) 325 MG EC TABLET    Take 325 mg by mouth once daily.    DEXAMETHASONE (DECADRON) 4 MG TAB    Take 5 tablets (20 mg total) by mouth As instructed. Take the night before chemo & the morning of chemo.    EPINASTINE 0.05 % OPHTHALMIC SOLUTION    PLACE 1 DROP(S) IN BOTH EYES TWICE DAILY    FLUTICASONE (FLONASE) 50 MCG/ACTUATION NASAL SPRAY    USE TWO SPRAYS IN EACH NOSTRIL ONCE A DAY    HYDROCODONE-ACETAMINOPHEN 5-325MG (NORCO) 5-325 MG PER TABLET    Take 1 tablet by mouth every 6 (six) hours as needed for Pain.    ISOSORBIDE MONONITRATE (IMDUR) 60 MG 24 HR TABLET    Take 30 mg by mouth once daily.     METOPROLOL TARTRATE (LOPRESSOR) 25 MG TABLET    Take 25 mg by mouth 2 (two) times daily.     NITROSTAT 0.4 MG SL TABLET    place 1 tablet under the tongue AS NEEDED no more than 3 in 15 minutes     PROCHLORPERAZINE (COMPAZINE) 10 MG TABLET    Take 1 tablet (10 mg total) by mouth every 6 (six) hours as needed.    ROSUVASTATIN (CRESTOR) 20 MG TABLET    Take 1 tablet (20 mg total) by mouth once daily.    SPIRIVA RESPIMAT 2.5 MCG/ACTUATION MIST    Inhale 1 puff into the lungs once daily.       Past Medical Hx :  Lung Cancer; COPD. DJD; Hypertension; Hyperlipidemia; CAD    Past Medical Hx :  Reviewed.   Past Medical History:   Diagnosis Date    Acute respiratory failure with hypoxia and hypercapnia 11/29/2017    Angina pectoris     Arthritis     Bell's palsy     left facial weakness    Breast cancer     RIGHT    CAD (coronary artery disease)     Cervical cancer     Chronic bronchitis     COPD (chronic obstructive pulmonary disease)     Dr. Katz    Dental bridge present     Emphysema of lung     H/O colonoscopy 06/2017    due for repeat colonsocopy in 6/2018    History of heart artery stent     Dr. Ortiz  x2 stents    Hyperlipidemia     Hypertension     Lung cancer     Myocardial infarction     SUNITHA (obstructive sleep apnea)     intolerant to mask    Pneumonia     Pneumonia due to other staphylococcus     PUD (peptic ulcer disease)     Sleep apnea     Vaginal delivery     x1       Travel Hx :  N/A    Immunization :  Immunization History   Administered Date(s) Administered    Influenza - High Dose 10/05/2015    Pneumococcal Conjugate - 13 Valent 07/11/2017       Family Hx :  Family History   Problem Relation Age of Onset    Cancer Mother         breast    Heart disease Mother     Breast cancer Mother     Cancer Father         lung-smoker     Cancer Sister         lung-smoker     Heart attack Sister     Cancer Maternal Grandmother     Heart disease Maternal Grandmother     Cancer Maternal Grandfather     Heart disease Maternal Grandfather     Cancer Paternal Grandmother     Cancer Paternal Grandfather     Cancer Sister         mets not sure where it started        Social Hx  :  Social History     Social History    Marital status: Single     Spouse name: N/A    Number of children: N/A    Years of education: N/A     Occupational History    Not on file.     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 Right Breast Cancer; Coronary artery stent; conisation Bx. Nima Cath    Symptoms :    Review of Systems   Constitutional: Positive for chills (Few night ago.) and fever.   HENT: Positive for congestion and nosebleeds.    Eyes: Negative for blurred vision, double vision, photophobia, pain, discharge and redness.   Respiratory: Positive for cough, sputum production (Clear) and shortness of breath.    Cardiovascular: Negative for chest pain, palpitations, orthopnea, claudication, leg swelling and PND.   Gastrointestinal: Positive for constipation. Negative for abdominal pain, blood in stool, diarrhea, heartburn, melena, nausea and vomiting.   Genitourinary: Negative for dysuria, flank pain, frequency, hematuria and urgency.   Musculoskeletal: Positive for back pain. Negative for falls, joint pain, myalgias and neck pain.   Skin: Positive for rash.   Neurological: Positive for dizziness. Negative for tingling, tremors, sensory change, speech change, focal weakness, seizures, loss of consciousness and headaches.   Endo/Heme/Allergies: Negative for environmental allergies. Does not bruise/bleed easily.   Psychiatric/Behavioral: Negative for depression, hallucinations, memory loss, substance abuse and suicidal ideas. The patient is not nervous/anxious and does not have insomnia.        Physical Exam :   Physical Exam   Constitutional: She  is oriented to person, place, and time and well-developed, well-nourished, and in no distress. Vital signs are normal. No distress.   HENT:   Head: Normocephalic and atraumatic.   Right Ear: External ear normal.   Left Ear: External ear normal.   Nose: Nose normal.   Mouth/Throat: Oropharynx is clear and moist.   Eyes: Conjunctivae, EOM and lids are normal. Pupils are equal, round, and reactive to light. Lids are everted and swept, no foreign bodies found. Right eye exhibits no discharge. Left eye exhibits no discharge. No scleral icterus.   Neck: Trachea normal, normal range of motion, full passive range of motion without pain and phonation normal. Neck supple. Normal carotid pulses, no hepatojugular reflux and no JVD present. Carotid bruit is not present. No tracheal deviation present. No thyroid mass and no thyromegaly present.   Cardiovascular: Normal rate, regular rhythm, normal heart sounds, intact distal pulses and normal pulses.  PMI is not displaced.  Exam reveals no gallop.    No murmur heard.  Pulmonary/Chest: Effort normal and breath sounds normal. No stridor. No respiratory distress. She has no wheezes. She has no rales.         She exhibits no tenderness.   Abdominal: Soft. Bowel sounds are normal. She exhibits no distension, no ascites and no mass. There is no hepatosplenomegaly. There is no tenderness. There is no rebound, no guarding and no CVA tenderness. No hernia.   Genitourinary:   Genitourinary Comments: Not examined   Musculoskeletal: Normal range of motion. She exhibits no edema, tenderness or deformity.   Lymphadenopathy:        Head (right side): No submental, no submandibular, no tonsillar, no preauricular, no posterior auricular and no occipital adenopathy present.        Head (left side): No submental, no submandibular, no tonsillar, no preauricular, no posterior auricular and no occipital adenopathy present.     She has no cervical adenopathy.     She has no axillary adenopathy.         Right: No inguinal, no supraclavicular and no epitrochlear adenopathy present.        Left: No inguinal, no supraclavicular and no epitrochlear adenopathy present.   Neurological: She is alert and oriented to person, place, and time. She has normal sensation, normal strength, normal reflexes and intact cranial nerves. She displays normal reflexes. No cranial nerve deficit. She exhibits normal muscle tone. Gait normal. Coordination normal. GCS score is 15.   Skin: Skin is warm, dry and intact. Rash noted. She is not diaphoretic. No cyanosis or erythema. No pallor. Nails show no clubbing.        Psychiatric: Mood, memory, affect and judgment normal.         Labs & Imaging :  05/07/18 : NFBS 212; Cr.0.8; Bili 0.5 ALP 48. Ca 9.3;  WBC 7,460. ANC 6,000 Hgb 10.9; Hct 32.5.  PET/CT on 3/29/18 Revealed dramatic Improvement with 90 % reduction in Tumor size.        Dx :  Urticarial rash Both arms. Lung Cancer on Chemotherapy      Assessment & Plan:  Discussed with patient. Medrol dosepack. Followup with Dr. Holliday. If Sx worsen go to ER. Patient understands and verbalised.

## 2018-05-07 NOTE — PLAN OF CARE
"Problem: Patient Care Overview  Goal: Plan of Care Review  Outcome: Ongoing (interventions implemented as appropriate)  Pt arrived to unit. Pt reports having SOB, dizziness and "feeling full" since Thursday. Bowel sounds are slow to all quadrants. Also reports rash to right forearm. Red and raised and itching. Also noted to left forearm. Dr. Clark notified and will see pt today for Dr. Holliday. Tolerated Abraxane. No reactions noted. Pt accompanied by PCT to md office.      "

## 2018-05-14 ENCOUNTER — INFUSION (OUTPATIENT)
Dept: INFUSION THERAPY | Facility: HOSPITAL | Age: 73
End: 2018-05-14
Attending: INTERNAL MEDICINE
Payer: MEDICARE

## 2018-05-14 VITALS
SYSTOLIC BLOOD PRESSURE: 131 MMHG | TEMPERATURE: 98 F | HEART RATE: 61 BPM | DIASTOLIC BLOOD PRESSURE: 71 MMHG | RESPIRATION RATE: 18 BRPM | OXYGEN SATURATION: 96 %

## 2018-05-14 DIAGNOSIS — C34.90 SQUAMOUS CELL CARCINOMA OF LUNG, UNSPECIFIED LATERALITY: Primary | ICD-10-CM

## 2018-05-14 LAB
ERYTHROCYTE [DISTWIDTH] IN BLOOD BY AUTOMATED COUNT: 16 %
HCT VFR BLD AUTO: 30.2 %
HGB BLD-MCNC: 10.2 G/DL
MCH RBC QN AUTO: 29.7 PG
MCHC RBC AUTO-ENTMCNC: 33.8 G/DL
MCV RBC AUTO: 88 FL
NEUTROPHILS # BLD AUTO: 3.7 K/UL
PLATELET # BLD AUTO: 200 K/UL
PMV BLD AUTO: 8.8 FL
RBC # BLD AUTO: 3.44 M/UL
WBC # BLD AUTO: 4.96 K/UL

## 2018-05-14 PROCEDURE — 96413 CHEMO IV INFUSION 1 HR: CPT

## 2018-05-14 PROCEDURE — A4216 STERILE WATER/SALINE, 10 ML: HCPCS | Performed by: INTERNAL MEDICINE

## 2018-05-14 PROCEDURE — 96367 TX/PROPH/DG ADDL SEQ IV INF: CPT

## 2018-05-14 PROCEDURE — 96417 CHEMO IV INFUS EACH ADDL SEQ: CPT

## 2018-05-14 PROCEDURE — 85027 COMPLETE CBC AUTOMATED: CPT

## 2018-05-14 PROCEDURE — 25000003 PHARM REV CODE 250: Performed by: INTERNAL MEDICINE

## 2018-05-14 PROCEDURE — S0028 INJECTION, FAMOTIDINE, 20 MG: HCPCS | Performed by: INTERNAL MEDICINE

## 2018-05-14 PROCEDURE — 63600175 PHARM REV CODE 636 W HCPCS: Performed by: INTERNAL MEDICINE

## 2018-05-14 RX ORDER — HEPARIN 100 UNIT/ML
500 SYRINGE INTRAVENOUS
Status: DISCONTINUED | OUTPATIENT
Start: 2018-05-14 | End: 2018-05-14 | Stop reason: HOSPADM

## 2018-05-14 RX ORDER — FAMOTIDINE 10 MG/ML
20 INJECTION INTRAVENOUS
Status: DISCONTINUED | OUTPATIENT
Start: 2018-05-14 | End: 2018-05-14

## 2018-05-14 RX ORDER — FAMOTIDINE 10 MG/ML
20 INJECTION INTRAVENOUS
Status: CANCELLED | OUTPATIENT
Start: 2018-05-28

## 2018-05-14 RX ORDER — DIPHENHYDRAMINE HYDROCHLORIDE 50 MG/ML
50 INJECTION INTRAMUSCULAR; INTRAVENOUS ONCE AS NEEDED
Status: CANCELLED | OUTPATIENT
Start: 2018-05-28 | End: 2018-05-28

## 2018-05-14 RX ORDER — HEPARIN 100 UNIT/ML
500 SYRINGE INTRAVENOUS
Status: CANCELLED | OUTPATIENT
Start: 2018-05-21

## 2018-05-14 RX ORDER — EPINEPHRINE 0.3 MG/.3ML
0.3 INJECTION SUBCUTANEOUS ONCE AS NEEDED
Status: CANCELLED | OUTPATIENT
Start: 2018-05-21 | End: 2018-05-21

## 2018-05-14 RX ORDER — EPINEPHRINE 0.3 MG/.3ML
0.3 INJECTION SUBCUTANEOUS ONCE AS NEEDED
Status: CANCELLED | OUTPATIENT
Start: 2018-05-14 | End: 2018-05-14

## 2018-05-14 RX ORDER — EPINEPHRINE 0.3 MG/.3ML
0.3 INJECTION SUBCUTANEOUS ONCE AS NEEDED
Status: CANCELLED | OUTPATIENT
Start: 2018-05-28 | End: 2018-05-28

## 2018-05-14 RX ORDER — FAMOTIDINE 10 MG/ML
20 INJECTION INTRAVENOUS
Status: CANCELLED | OUTPATIENT
Start: 2018-05-21

## 2018-05-14 RX ORDER — HEPARIN 100 UNIT/ML
500 SYRINGE INTRAVENOUS
Status: CANCELLED | OUTPATIENT
Start: 2018-05-28

## 2018-05-14 RX ORDER — DIPHENHYDRAMINE HYDROCHLORIDE 50 MG/ML
50 INJECTION INTRAMUSCULAR; INTRAVENOUS ONCE AS NEEDED
Status: CANCELLED | OUTPATIENT
Start: 2018-05-14 | End: 2018-05-14

## 2018-05-14 RX ORDER — SODIUM CHLORIDE 0.9 % (FLUSH) 0.9 %
10 SYRINGE (ML) INJECTION
Status: CANCELLED | OUTPATIENT
Start: 2018-05-28

## 2018-05-14 RX ORDER — FAMOTIDINE 20 MG/50ML
20 INJECTION, SOLUTION INTRAVENOUS
Status: COMPLETED | OUTPATIENT
Start: 2018-05-14 | End: 2018-05-14

## 2018-05-14 RX ORDER — SODIUM CHLORIDE 0.9 % (FLUSH) 0.9 %
10 SYRINGE (ML) INJECTION
Status: DISCONTINUED | OUTPATIENT
Start: 2018-05-14 | End: 2018-05-14 | Stop reason: HOSPADM

## 2018-05-14 RX ORDER — SODIUM CHLORIDE 0.9 % (FLUSH) 0.9 %
10 SYRINGE (ML) INJECTION
Status: CANCELLED | OUTPATIENT
Start: 2018-05-21

## 2018-05-14 RX ORDER — DIPHENHYDRAMINE HYDROCHLORIDE 50 MG/ML
50 INJECTION INTRAMUSCULAR; INTRAVENOUS ONCE AS NEEDED
Status: CANCELLED | OUTPATIENT
Start: 2018-05-21 | End: 2018-05-21

## 2018-05-14 RX ADMIN — HEPARIN 500 UNITS: 100 SYRINGE at 11:05

## 2018-05-14 RX ADMIN — SODIUM CHLORIDE 630 MG: 9 INJECTION, SOLUTION INTRAVENOUS at 10:05

## 2018-05-14 RX ADMIN — PALONOSETRON HYDROCHLORIDE: 0.25 INJECTION INTRAVENOUS at 09:05

## 2018-05-14 RX ADMIN — PACLITAXEL 190 MG: 100 INJECTION, POWDER, LYOPHILIZED, FOR SUSPENSION INTRAVENOUS at 10:05

## 2018-05-14 RX ADMIN — FAMOTIDINE 20 MG: 20 INJECTION, SOLUTION INTRAVENOUS at 09:05

## 2018-05-14 RX ADMIN — SODIUM CHLORIDE, PRESERVATIVE FREE 10 ML: 5 INJECTION INTRAVENOUS at 11:05

## 2018-05-14 RX ADMIN — DIPHENHYDRAMINE HYDROCHLORIDE 50 MG: 50 INJECTION INTRAMUSCULAR; INTRAVENOUS at 08:05

## 2018-05-14 RX ADMIN — SODIUM CHLORIDE: 0.9 INJECTION, SOLUTION INTRAVENOUS at 10:05

## 2018-05-14 NOTE — PLAN OF CARE
Problem: Patient Care Overview  Goal: Plan of Care Review  Outcome: Ongoing (interventions implemented as appropriate)  Patient received Carboplatin and Abraxane. Tolerated well. VSS. Labs WNL. Received discharge instructions and verbalized understanding.

## 2018-05-16 ENCOUNTER — TELEPHONE (OUTPATIENT)
Dept: FAMILY MEDICINE | Facility: CLINIC | Age: 73
End: 2018-05-16

## 2018-05-16 NOTE — TELEPHONE ENCOUNTER
----- Message from Annamarie Meyer sent at 5/16/2018  8:17 AM CDT -----  Contact: self  Pt would like an appt for clearance for Cataract Surgery.  [phone]

## 2018-05-18 ENCOUNTER — OFFICE VISIT (OUTPATIENT)
Dept: FAMILY MEDICINE | Facility: CLINIC | Age: 73
End: 2018-05-18
Payer: MEDICARE

## 2018-05-18 VITALS
TEMPERATURE: 98 F | BODY MASS INDEX: 29.3 KG/M2 | HEART RATE: 70 BPM | SYSTOLIC BLOOD PRESSURE: 100 MMHG | HEIGHT: 63 IN | OXYGEN SATURATION: 97 % | RESPIRATION RATE: 16 BRPM | DIASTOLIC BLOOD PRESSURE: 70 MMHG | WEIGHT: 165.38 LBS

## 2018-05-18 DIAGNOSIS — J44.9 CHRONIC OBSTRUCTIVE PULMONARY DISEASE, UNSPECIFIED COPD TYPE: ICD-10-CM

## 2018-05-18 DIAGNOSIS — R82.998 LEUKOCYTES IN URINE: ICD-10-CM

## 2018-05-18 DIAGNOSIS — Z01.818 PREOPERATIVE CLEARANCE: Primary | ICD-10-CM

## 2018-05-18 DIAGNOSIS — C34.90 MALIGNANT NEOPLASM OF BRONCHUS AND LUNG: ICD-10-CM

## 2018-05-18 LAB
BILIRUB SERPL-MCNC: NEGATIVE MG/DL
BLOOD URINE, POC: NEGATIVE
COLOR, POC UA: NORMAL
GLUCOSE UR QL STRIP: NEGATIVE
KETONES UR QL STRIP: NEGATIVE
LEUKOCYTE ESTERASE URINE, POC: NORMAL
NITRITE, POC UA: NEGATIVE
PH, POC UA: 5
PROTEIN, POC: NORMAL
SPECIFIC GRAVITY, POC UA: 1.01
UROBILINOGEN, POC UA: NEGATIVE

## 2018-05-18 PROCEDURE — 99999 PR PBB SHADOW E&M-EST. PATIENT-LVL III: CPT | Mod: PBBFAC,,, | Performed by: FAMILY MEDICINE

## 2018-05-18 PROCEDURE — 81002 URINALYSIS NONAUTO W/O SCOPE: CPT | Mod: S$GLB,,, | Performed by: FAMILY MEDICINE

## 2018-05-18 PROCEDURE — 87086 URINE CULTURE/COLONY COUNT: CPT

## 2018-05-18 PROCEDURE — 99499 UNLISTED E&M SERVICE: CPT | Mod: S$GLB,,, | Performed by: FAMILY MEDICINE

## 2018-05-18 PROCEDURE — 99214 OFFICE O/P EST MOD 30 MIN: CPT | Mod: 25,S$GLB,, | Performed by: FAMILY MEDICINE

## 2018-05-18 NOTE — PROGRESS NOTES
Subjective:       Patient ID: Ade Castellano is a 72 y.o. female.    Chief Complaint: Pre-op Exam    Patient presents for follow up. She states she needs a pre op clearance for surgery. She plans to have cataract surgery. She sis on chemotherapy for lung cancer that his spread to her lymph node.she is currently receiving chemotherapy She states her recent PET CT scan showed a decrease in her cancer or a 90% reduction of her cancer activity.     Past Medical History:  11/29/2017: Acute respiratory failure with hypoxia and hyp*  No date: Angina pectoris  No date: Arthritis  No date: Bell's palsy      Comment: left facial weakness  No date: Breast cancer      Comment: RIGHT  No date: CAD (coronary artery disease)  No date: Cervical cancer  No date: Chronic bronchitis  No date: COPD (chronic obstructive pulmonary disease)      Comment: Dr. Katz  No date: Dental bridge present  No date: Emphysema of lung  06/2017: H/O colonoscopy      Comment: due for repeat colonsocopy in 6/2018  No date: History of heart artery stent      Comment: Dr. Ortiz  x2 stents  No date: Hyperlipidemia  No date: Hypertension  No date: Lung cancer  No date: Myocardial infarction  No date: SUNITHA (obstructive sleep apnea)      Comment: intolerant to mask  No date: Pneumonia  No date: Pneumonia due to other staphylococcus  No date: PUD (peptic ulcer disease)  No date: Sleep apnea  No date: Vaginal delivery      Comment: x1   Past Surgical History:  No date: ADENOIDECTOMY  No date: BREAST BIOPSY Right      Comment: x3  No date: BREAST SURGERY      Comment: lumpectomy right side   No date: CERVIX SURGERY      Comment: cone  3/17/2017: COLONOSCOPY N/A      Comment: Procedure: COLONOSCOPY;  Surgeon: Julio Rudd MD;  Location: [facility] ENDO;  Service: Endoscopy;                Laterality: N/A;  6/30/2017: COLONOSCOPY N/A      Comment: Procedure: COLONOSCOPY;  Surgeon: Julio Rudd MD;  Location: [facility] ENDO;  Service: Endoscopy;                 Laterality: N/A;  No date: sweat glands axillary regions  No date: TONSILLECTOMY  Review of patient's family history indicates:  Problem: Cancer      Relation: Mother       Age of Onset: (Not Specified)       Comment: breast  Problem: Heart disease      Relation: Mother       Age of Onset: (Not Specified)   Problem: Breast cancer      Relation: Mother       Age of Onset: (Not Specified)   Problem: Cancer      Relation: Father       Age of Onset: (Not Specified)       Comment: lung-smoker   Problem: Cancer      Relation: Sister       Age of Onset: (Not Specified)       Comment: lung-smoker   Problem: Heart attack      Relation: Sister       Age of Onset: (Not Specified)   Problem: Cancer      Relation: Maternal Grandmother       Age of Onset: (Not Specified)   Problem: Heart disease      Relation: Maternal Grandmother       Age of Onset: (Not Specified)   Problem: Cancer      Relation: Maternal Grandfather       Age of Onset: (Not Specified)   Problem: Heart disease      Relation: Maternal Grandfather       Age of Onset: (Not Specified)   Problem: Cancer      Relation: Paternal Grandmother       Age of Onset: (Not Specified)   Problem: Cancer      Relation: Paternal Grandfather       Age of Onset: (Not Specified)   Problem: Cancer      Relation: Sister       Age of Onset: (Not Specified)       Comment: mets not sure where it started     Social History    Marital status: Single              Spouse name:                       Years of education:                 Number of children:               Occupational History    None on file    Social History Main Topics    Smoking status: Former Smoker                                                                Packs/day: 0.25      Years: 50.00          Types: Cigarettes       Quit date: 8/23/2009    Smokeless tobacco: Never Used                        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   Respiratory: Positive for cough. Negative for chest tightness, shortness of breath and wheezing.    Cardiovascular: Negative for chest pain, palpitations and leg swelling.   Gastrointestinal: Positive for abdominal pain and constipation. Negative for blood in stool, diarrhea, nausea and vomiting.        RUQ  Abdominal pain . She states it last occurred 2 days ago after eating. She states it improves with lying down. It doesn't have nausea or vomiting. It feels like a tightness. She has no diarrhea   Genitourinary: Negative for hematuria.       Objective:       Vitals:    05/18/18 0905   BP: 100/70   Pulse: 70   Resp: 16   Temp: 97.7 °F (36.5 °C)   TempSrc: Oral   SpO2: 97%   Weight: 75 kg (165 lb 5.5 oz)   Height: 5' 3" (1.6 m)       Physical Exam   Constitutional: She appears well-developed and well-nourished. No distress.   HENT:   Head: Normocephalic.   Right Ear: External ear normal.   Left Ear: External ear normal.   Mouth/Throat: Oropharynx is clear and moist. No oropharyngeal exudate.   Eyes: Conjunctivae are normal.   Neck: Normal range of motion. Neck supple. No thyromegaly present.   Cardiovascular: Normal rate, regular rhythm and normal heart sounds.  Exam reveals no gallop and no friction rub.    No murmur heard.  Pulmonary/Chest: Effort normal and breath sounds normal. No respiratory distress. She has no wheezes. She has no rales.   Abdominal: Soft. Bowel sounds are normal. She exhibits no distension and no mass. There is no tenderness. There is no rebound and no guarding.   Musculoskeletal: She exhibits no edema.   Lymphadenopathy:     She has no cervical adenopathy.   Neurological: She is alert.   Skin: No rash noted. She is not diaphoretic.   Psychiatric: She has a normal mood and affect.       Assessment: "       1. Preoperative clearance    2. Malignant neoplasm of bronchus and lung    3. Chronic obstructive pulmonary disease, unspecified COPD type    4. Leukocytes in urine        Plan:       Ade was seen today for pre-op exam.    Diagnoses and all orders for this visit:    Preoperative clearance  -     POCT urine dipstick without microscope  SHE IS CLEARED FOR SURGERY. WILL CULTURE URINE NAD IF ABNORMAL WILL TREAT. SHE HAD A CBC AND CMP RECENTLY THAT WAS NORMAL.     Malignant neoplasm of bronchus and lung    Chronic obstructive pulmonary disease, unspecified COPD type    Leukocytes in urine  -     Urine culture; Future

## 2018-05-20 LAB — BACTERIA UR CULT: NORMAL

## 2018-05-21 ENCOUNTER — INFUSION (OUTPATIENT)
Dept: INFUSION THERAPY | Facility: HOSPITAL | Age: 73
End: 2018-05-21
Attending: INTERNAL MEDICINE
Payer: MEDICARE

## 2018-05-21 VITALS
HEART RATE: 60 BPM | SYSTOLIC BLOOD PRESSURE: 111 MMHG | RESPIRATION RATE: 18 BRPM | DIASTOLIC BLOOD PRESSURE: 61 MMHG | TEMPERATURE: 98 F | OXYGEN SATURATION: 97 %

## 2018-05-21 DIAGNOSIS — C34.90 SQUAMOUS CELL CARCINOMA OF LUNG, UNSPECIFIED LATERALITY: Primary | ICD-10-CM

## 2018-05-21 PROCEDURE — S0028 INJECTION, FAMOTIDINE, 20 MG: HCPCS | Performed by: INTERNAL MEDICINE

## 2018-05-21 PROCEDURE — 63600175 PHARM REV CODE 636 W HCPCS: Performed by: INTERNAL MEDICINE

## 2018-05-21 PROCEDURE — 96367 TX/PROPH/DG ADDL SEQ IV INF: CPT

## 2018-05-21 PROCEDURE — A4216 STERILE WATER/SALINE, 10 ML: HCPCS | Performed by: INTERNAL MEDICINE

## 2018-05-21 PROCEDURE — 96413 CHEMO IV INFUSION 1 HR: CPT

## 2018-05-21 PROCEDURE — 25000003 PHARM REV CODE 250: Performed by: INTERNAL MEDICINE

## 2018-05-21 RX ORDER — FAMOTIDINE 10 MG/ML
20 INJECTION INTRAVENOUS
Status: DISCONTINUED | OUTPATIENT
Start: 2018-05-21 | End: 2018-05-21

## 2018-05-21 RX ORDER — HEPARIN 100 UNIT/ML
500 SYRINGE INTRAVENOUS
Status: DISCONTINUED | OUTPATIENT
Start: 2018-05-21 | End: 2018-05-21 | Stop reason: HOSPADM

## 2018-05-21 RX ORDER — SODIUM CHLORIDE 0.9 % (FLUSH) 0.9 %
10 SYRINGE (ML) INJECTION
Status: DISCONTINUED | OUTPATIENT
Start: 2018-05-21 | End: 2018-05-21 | Stop reason: HOSPADM

## 2018-05-21 RX ORDER — FAMOTIDINE 20 MG/50ML
20 INJECTION, SOLUTION INTRAVENOUS
Status: COMPLETED | OUTPATIENT
Start: 2018-05-21 | End: 2018-05-21

## 2018-05-21 RX ADMIN — DIPHENHYDRAMINE HYDROCHLORIDE 50 MG: 50 INJECTION INTRAMUSCULAR; INTRAVENOUS at 08:05

## 2018-05-21 RX ADMIN — SODIUM CHLORIDE: 0.9 INJECTION, SOLUTION INTRAVENOUS at 09:05

## 2018-05-21 RX ADMIN — PACLITAXEL 190 MG: 100 INJECTION, POWDER, LYOPHILIZED, FOR SUSPENSION INTRAVENOUS at 09:05

## 2018-05-21 RX ADMIN — PALONOSETRON HYDROCHLORIDE: 0.25 INJECTION INTRAVENOUS at 09:05

## 2018-05-21 RX ADMIN — FAMOTIDINE 20 MG: 20 INJECTION, SOLUTION INTRAVENOUS at 08:05

## 2018-05-21 RX ADMIN — HEPARIN 500 UNITS: 100 SYRINGE at 10:05

## 2018-05-21 RX ADMIN — SODIUM CHLORIDE, PRESERVATIVE FREE 10 ML: 5 INJECTION INTRAVENOUS at 10:05

## 2018-05-21 NOTE — PLAN OF CARE
Problem: Patient Care Overview  Goal: Plan of Care Review  Outcome: Ongoing (interventions implemented as appropriate)  Tolerated Abraxane. Reports cough and sometimes had clear sputum. No other complaints voiced. AVS given to pt.

## 2018-05-28 ENCOUNTER — OFFICE VISIT (OUTPATIENT)
Dept: HEMATOLOGY/ONCOLOGY | Facility: CLINIC | Age: 73
End: 2018-05-28
Payer: MEDICARE

## 2018-05-28 ENCOUNTER — INFUSION (OUTPATIENT)
Dept: INFUSION THERAPY | Facility: HOSPITAL | Age: 73
End: 2018-05-28
Attending: INTERNAL MEDICINE
Payer: MEDICARE

## 2018-05-28 ENCOUNTER — HOSPITAL ENCOUNTER (OUTPATIENT)
Dept: RADIOLOGY | Facility: HOSPITAL | Age: 73
Discharge: HOME | End: 2018-05-28
Attending: INTERNAL MEDICINE
Payer: MEDICARE

## 2018-05-28 VITALS
DIASTOLIC BLOOD PRESSURE: 84 MMHG | OXYGEN SATURATION: 96 % | HEART RATE: 77 BPM | RESPIRATION RATE: 18 BRPM | SYSTOLIC BLOOD PRESSURE: 174 MMHG | TEMPERATURE: 98 F

## 2018-05-28 VITALS
BODY MASS INDEX: 30.29 KG/M2 | TEMPERATURE: 99 F | WEIGHT: 170.94 LBS | HEIGHT: 63 IN | OXYGEN SATURATION: 95 % | HEART RATE: 75 BPM | RESPIRATION RATE: 16 BRPM | SYSTOLIC BLOOD PRESSURE: 129 MMHG | DIASTOLIC BLOOD PRESSURE: 80 MMHG

## 2018-05-28 DIAGNOSIS — J44.9 CHRONIC OBSTRUCTIVE PULMONARY DISEASE, UNSPECIFIED COPD TYPE: Chronic | ICD-10-CM

## 2018-05-28 DIAGNOSIS — D64.81 ANTINEOPLASTIC CHEMOTHERAPY INDUCED ANEMIA: ICD-10-CM

## 2018-05-28 DIAGNOSIS — R05.9 COUGH: ICD-10-CM

## 2018-05-28 DIAGNOSIS — Z09 CHEMOTHERAPY FOLLOW-UP EXAMINATION: ICD-10-CM

## 2018-05-28 DIAGNOSIS — C34.91 SQUAMOUS CELL CARCINOMA OF RIGHT LUNG: Chronic | ICD-10-CM

## 2018-05-28 DIAGNOSIS — C34.91 SQUAMOUS CELL CARCINOMA OF RIGHT LUNG: Primary | Chronic | ICD-10-CM

## 2018-05-28 DIAGNOSIS — T45.1X5A ANTINEOPLASTIC CHEMOTHERAPY INDUCED ANEMIA: ICD-10-CM

## 2018-05-28 DIAGNOSIS — C34.90 SQUAMOUS CELL CARCINOMA OF LUNG, UNSPECIFIED LATERALITY: Primary | ICD-10-CM

## 2018-05-28 PROCEDURE — 96367 TX/PROPH/DG ADDL SEQ IV INF: CPT

## 2018-05-28 PROCEDURE — 63600175 PHARM REV CODE 636 W HCPCS: Mod: JG | Performed by: INTERNAL MEDICINE

## 2018-05-28 PROCEDURE — 96413 CHEMO IV INFUSION 1 HR: CPT

## 2018-05-28 PROCEDURE — S0028 INJECTION, FAMOTIDINE, 20 MG: HCPCS | Performed by: INTERNAL MEDICINE

## 2018-05-28 PROCEDURE — 25000003 PHARM REV CODE 250: Performed by: INTERNAL MEDICINE

## 2018-05-28 PROCEDURE — A4216 STERILE WATER/SALINE, 10 ML: HCPCS | Performed by: INTERNAL MEDICINE

## 2018-05-28 PROCEDURE — 71046 X-RAY EXAM CHEST 2 VIEWS: CPT | Mod: TC,FY

## 2018-05-28 PROCEDURE — 99214 OFFICE O/P EST MOD 30 MIN: CPT | Mod: S$GLB,,, | Performed by: INTERNAL MEDICINE

## 2018-05-28 PROCEDURE — 99499 UNLISTED E&M SERVICE: CPT | Mod: S$GLB,,, | Performed by: INTERNAL MEDICINE

## 2018-05-28 PROCEDURE — 71046 X-RAY EXAM CHEST 2 VIEWS: CPT | Mod: 26,,, | Performed by: RADIOLOGY

## 2018-05-28 PROCEDURE — 99999 PR PBB SHADOW E&M-EST. PATIENT-LVL III: CPT | Mod: PBBFAC,,, | Performed by: INTERNAL MEDICINE

## 2018-05-28 RX ORDER — HEPARIN 100 UNIT/ML
500 SYRINGE INTRAVENOUS
Status: DISCONTINUED | OUTPATIENT
Start: 2018-05-28 | End: 2018-05-28 | Stop reason: HOSPADM

## 2018-05-28 RX ORDER — FAMOTIDINE 20 MG/50ML
20 INJECTION, SOLUTION INTRAVENOUS
Status: COMPLETED | OUTPATIENT
Start: 2018-05-28 | End: 2018-05-28

## 2018-05-28 RX ORDER — DUREZOL 0.5 MG/ML
EMULSION OPHTHALMIC
Refills: 1 | COMMUNITY
Start: 2018-05-24 | End: 2018-08-23

## 2018-05-28 RX ORDER — FAMOTIDINE 10 MG/ML
20 INJECTION INTRAVENOUS
Status: DISCONTINUED | OUTPATIENT
Start: 2018-05-28 | End: 2018-05-28

## 2018-05-28 RX ORDER — CIPROFLOXACIN HYDROCHLORIDE 3 MG/ML
SOLUTION/ DROPS OPHTHALMIC
Refills: 1 | COMMUNITY
Start: 2018-05-24 | End: 2018-08-23

## 2018-05-28 RX ORDER — SODIUM CHLORIDE 0.9 % (FLUSH) 0.9 %
10 SYRINGE (ML) INJECTION
Status: DISCONTINUED | OUTPATIENT
Start: 2018-05-28 | End: 2018-05-28 | Stop reason: HOSPADM

## 2018-05-28 RX ADMIN — SODIUM CHLORIDE: 0.9 INJECTION, SOLUTION INTRAVENOUS at 03:05

## 2018-05-28 RX ADMIN — PACLITAXEL 190 MG: 100 INJECTION, POWDER, LYOPHILIZED, FOR SUSPENSION INTRAVENOUS at 03:05

## 2018-05-28 RX ADMIN — Medication 10 ML: at 04:05

## 2018-05-28 RX ADMIN — DIPHENHYDRAMINE HYDROCHLORIDE 50 MG: 50 INJECTION INTRAMUSCULAR; INTRAVENOUS at 02:05

## 2018-05-28 RX ADMIN — PALONOSETRON HYDROCHLORIDE: 0.25 INJECTION INTRAVENOUS at 02:05

## 2018-05-28 RX ADMIN — FAMOTIDINE 20 MG: 20 INJECTION, SOLUTION INTRAVENOUS at 03:05

## 2018-05-28 RX ADMIN — HEPARIN 500 UNITS: 100 SYRINGE at 04:05

## 2018-05-28 NOTE — PROGRESS NOTES
Subjective:       Patient ID: Ade Castellano is a 72 y.o. female.    Chief Complaint: No chief complaint on file.    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    Previously lost to follow-up     Remote hx of cervical CA 2004 - s/p cone bx        Pt hospitalized 11/2017   Ms. Castellano was admitted for acute on chronic respiratory failure requiring intubation and ventilation. Has large lung mass in right lung with probable post obstructive pneumonia resulting in COPD exacerbation. But also, patient had ran out of home O2 prior to onset of symptoms, likely contributing to presentation. Initiated on broad spectrum abx, IV steroids, duo-nebs and pulmonology consulted for ventilator co-management. Successfully extubated to BiPAP on 11/30. Then de-escalated to low flow nasal cannula. Abx tailored to augmentin for broad coverage, including anaerobic bacteria /     CTA chest 11/29/2017 reveals   Large right hilar mass with involvement of adjacent segmental pulmonary arterial branches and bronchi with associated postobstructive atelectasis and volume loss in the right middle lobe with adjacent ground glass opacity.  Findings highly concerning for underlying neoplastic process. Mediastinal and axillary adenopathy, with a right axillary lymph node measuring up to 2.0 cm in short axis diameter. No definite evidence of pulmonary thromboembolism.    She is followed by Pulm  She underwent rt axillary LN bx at outside facility- on 1/16/2018 at [facility]  Pathology revealed metastatic poorly differentiated carcinoma of unknown primary site  TTF-1 negative, napsin negative  , cytokeratin 7 positive, cytokeratin 20 negative, p63 negative, cytokeratin 5/6 focal dim  positivity    She next underwent lung bx at [facility] l1/31/2017   Pathology revealed benign lung tissue    She underwent repeat Right Lung Bx 2/14/2018 Pathology reveals Squamous cell carcinoma  PD-L1 10% low expression  EGFR NEG  ALK NEG  BRAF pending  Outside slides axillary LN specimen  requested for review/comparison to lung bx findings       She Is undergoing cycle 4 of carboplatin/Abraxane     She reports she is feeling more fatigued  She continues with mild  LOGAN -chronic   She wears 02 at night  Appetite and weight continues stable  No N/V  No taste alterations  No hemoptysis   She continues with cough  ,non-productive   She reports pain in shoulder and back resolved  She is no longer taking pain meds  Swelling lt arm resolved    No bleeding-urinary/rectal/nasal     PET/CT  4/19/2018 reveals there has been a excellent response to therapy.  At least 90% reduction in malignant activity.           PrevHx:She had an abnormal mammogram 6/4/2014 which  Revealed a round solid mass 6mm in rt breast.  She then underwent U/S guided core bx of rt breast mass on 6/17/2014.  Pathology revealed infiltrating ductal carcinoma Grade 3 with tumor  Present in thin-walled spaces suggestive of lymphatic spaces. Hormone  Receptor status on tumor specimen revealed ER negative 0% ,  TN negative 0% and Her 2 jose maria negative.  She subsequently underwent rt segmental mastectomy and SLN bx  On 7/11/2014. Pathology of rt breast lumpectomy revealed invasive  Ductal carcinoma with micropapillary pattern ( Invasive micropapillary  Ca) with max tumor dimension 11.5mm with suggestion of tumor in   Thin walled spaces c/w lymphovascular involvement. Surgical  Margins free of tumor, grade 3, ER/TN neg , Her 2 jose maria neg   With [facility] Lymph node negative for Neoplasm.   Pathologic staging [id number](i-)  Her mother was diagnosed with breast cancer in her 50's/  She has no family history of ovarian cancer.           Review of Systems   Constitutional:  "Positive for fatigue. Negative for appetite change, fever and unexpected weight change.   HENT: Negative for mouth sores and rhinorrhea.    Eyes: Negative for visual disturbance.   Respiratory: Positive for cough (improved) and shortness of breath (LOGAN significantly improved).    Cardiovascular: Negative for chest pain.   Gastrointestinal: Negative for abdominal pain and diarrhea.   Genitourinary: Negative for frequency.   Musculoskeletal: Positive for arthralgias and back pain.   Skin: Negative for rash.   Neurological: Negative for headaches.   Hematological: Negative for adenopathy.   Psychiatric/Behavioral: The patient is not nervous/anxious.        Objective:        Vitals:    05/28/18 1306   BP: 129/80   Pulse: 75   Resp: 16   Temp: 98.5 °F (36.9 °C)   SpO2: 95%   Weight: 77.6 kg (170 lb 15.5 oz)   Height: 5' 3" (1.6 m)         Physical Exam   Constitutional: She is oriented to person, place, and time. She appears well-developed and well-nourished.   HENT:   Head: Normocephalic.   Mouth/Throat: Oropharynx is clear and moist. No oropharyngeal exudate.   Eyes: Conjunctivae and lids are normal. Pupils are equal, round, and reactive to light. No scleral icterus.   Neck: Normal range of motion. Neck supple. No thyromegaly present.   Cardiovascular: Normal rate, regular rhythm and normal heart sounds.    No murmur heard.  Pulmonary/Chest: She has no wheezes. Right breast exhibits no inverted nipple, no mass, no nipple discharge and no skin change. Left breast exhibits no inverted nipple, no mass, no nipple discharge and no skin change.   Bibasilar crackles R>L   Abdominal: Soft. Bowel sounds are normal. She exhibits no distension and no mass. There is no hepatosplenomegaly. There is no tenderness. There is no rebound and no guarding.   Musculoskeletal: Normal range of motion. She exhibits no edema or tenderness.   Lymphadenopathy:     She has no cervical adenopathy.     She has no axillary adenopathy.        Right: " No supraclavicular adenopathy present.        Left: No supraclavicular adenopathy present.   Neurological: She is alert and oriented to person, place, and time. No cranial nerve deficit. Coordination normal.   Skin: Skin is warm and dry. No ecchymosis, no petechiae and no rash noted. No erythema.   Psychiatric: She has a normal mood and affect.     Mammo 6/12/2017   Impression:  No mammographic evidence of malignancy.     BI-RADS Category 1: Negative  Mammo 6/12/2017   Impression:  No mammographic evidence of malignancy.     BI-RADS Category 1: Negative      CTA chest w/contrast 11/28/2018  1. Large right hilar mass with involvement of adjacent segmental pulmonary arterial branches and bronchi with associated postobstructive atelectasis and volume loss in the right middle lobe with adjacent ground glass opacity.  Findings highly concerning for underlying neoplastic process.    2. Mediastinal and axillary adenopathy, with a right axillary lymph node measuring up to 2.0 cm in short axis diameter.    3. No definite evidence of pulmonary thromboembolism.      CT a/p w/contrast 11/29/2018   1. No acute abnormality identified within the abdomen and pelvis.    2.  There are a few nonspecific prominent lymph nodes in the upper abdomen including a periesophageal lymph node which measures 1.0 cm in short axis diameter.    3.  Significant abdominal aortic atherosclerosis and abdominal aorta ectasia.    4.  Additional findings as above.    PET/CT 1/26/2018   1.  Intense FDG uptake within the known right infrahilar lung mass, compatible with malignancy.  It is unclear if this represents primary lung malignancy or metastatic breast cancer.    2.  Intensely hypermetabolic right axillary, retropectoral, and lower right cervical lymph nodes, compatible with metastases.    3.  Abnormal FDG uptake along right breast skin thickening, new from prior chest CTA and mammogram.  This may relate to localized edema/inflammation, though  correlation with physical exam and mammography are recommended to exclude underlying inflammatory carcinoma.      MRI Brain w w/out contrast 2/9/2018  1.  No evidence of intracranial metastases.  2.  Sinus disease       Rt axillary LN bx at outside facility- on 1/16/2018 at [facility]  Pathology revealed metastatic poorly differentiated carcinoma of unknown primary  site 1/16/2018 at [facility]  TTF-1 negative, napsin negative  , cytokeratin 7 positive, cytokeratin 20 negative, p63 negative, cytokeratin 5/6 focal dim positivity    LUNG BIOPSY 1/31/2017   Pathology revealed benign lung tissue         SPECIMEN  1) Right Lung Bx 2/14/2018  Supplemental Diagnosis  Immunohistochemical stains show strong nuclear staining for p63 in essentially all tumor cells and very strong  cytoplasmic and membrane staining for CK5/6, also in essentially all tumor cells. TTF-1 and CK7 are negative  within tumor cells but do stain native pulmonary elements present within the biopsy. A stain for mucicarmine is  negative. Positive and negative controls function appropriately.  Final diagnosis: Specimen submitted as right lung biopsy  -Squamous cell carcinoma  (Electronically Signed: 2018-02-20 09:12:07 )  Diagnosed by: Phong Thompson  FINAL PATHOLOGIC DIAGNOSIS  Fragments of pulmonary parenchyma (submitted as right lung biopsy):    FINAL PATHOLOGIC DIAGNOSIS 1. Lymph node, right axilla, biopsy, review of 10 outside slides [facility], JS [phone],  collected January 11, 2018: Metastatic poorly differentiated carcinoma (see comment).  The histologic section shows fibrous stroma infiltrated by nest of poorly differentiated malignant cells including  occasional dyskeratotic cells morphologically suggestive of metastatic squamous cell carcinoma.  Immunohistochemical stains are nonspecific; the cells are positive for cytokeratin 7 and cytokeratin 5/6 (focal) and  negative for cytokeratin 20, TTF-1,  p63, GCDFP, mammoglobin, and CEA      See above    There has been a excellent response to therapy.  At least 90% reduction in malignant activity    Assessment:       1. Squamous cell carcinoma of right lung    2. Chronic obstructive pulmonary disease, unspecified COPD type    3. Chemotherapy follow-up examination    4. Antineoplastic chemotherapy induced anemia    5. Cough        Plan:   ECOG 1  1-5 Pt with widely metastatic squamous cell CA lung     Pt with hx of Stage 1A invasive ductal carcinoma rt breast s/p rt segmental mastectomy and SLN bx 7/11/2014 ER/VT neg HER 2 jose maria neg [id number](i-).  S/p adjuvant RT completed 9/2014 Pt declined adjuvant chemo  Follow-up Mammo 6/12/2017 No mammographic evidence of malignancy.  Pt  hospitalized 11/2017 with acute-on-chronic  resp failure  Abnormal CT imaging revealing Large right hilar mass with involvement of  adjacent segmental pulmonary arterial branches and bronchi with associated postobstructive atelectasis  and involvement of mediastinal and axillary adenopathy   S/p rt axillary LN bx ( outside facility) - metastatic poorly differentiated carcinoma of unknown primary  site  status post recent lung biopsy-benign lung tissue  PET/CT 1/26/2018   Intense FDG uptake within the known right infrahilar lung mass, compatible with malignancy.   Intensely hypermetabolic right axillary, retropectoral, and lower right cervical lymph nodes, compatible with metastases.  Abnormal FDG uptake along right breast skin thickening, new from prior chest CTA and mammogram.    Repeat lung bx reveals Squamous Cell CA lung  PD-L1 10% low expression  EGFR NEG  ALK NEG  BRAF pending    Cont with carboplatin/abraxane  Pt responding clinically and radiographically    Plan CXR today     Proceed with cycle 4  of chemo     Cbc,cmp prior to weekly chemo     Follow-up 1 month with cbc,cmp prior to f/u       Cc: Swetha Katz M.D.         MD Tory Roberson MD

## 2018-05-28 NOTE — PLAN OF CARE
Problem: Patient Care Overview  Goal: Plan of Care Review  Outcome: Ongoing (interventions implemented as appropriate)  Patient received Abraxane. Tolerated well. VSS. Rash returned on patient arms and face. Patient instructed to apply ointment as she did last time the rash appeared. Instructed pt to contact MD if rash worsens or does not go away by the end of the week. Patient verbalized understanding.

## 2018-06-04 ENCOUNTER — INFUSION (OUTPATIENT)
Dept: INFUSION THERAPY | Facility: HOSPITAL | Age: 73
End: 2018-06-04
Attending: INTERNAL MEDICINE
Payer: MEDICARE

## 2018-06-04 VITALS
OXYGEN SATURATION: 100 % | HEART RATE: 64 BPM | RESPIRATION RATE: 18 BRPM | DIASTOLIC BLOOD PRESSURE: 86 MMHG | SYSTOLIC BLOOD PRESSURE: 115 MMHG | TEMPERATURE: 98 F

## 2018-06-04 DIAGNOSIS — C34.90 SQUAMOUS CELL CARCINOMA OF LUNG, UNSPECIFIED LATERALITY: Primary | ICD-10-CM

## 2018-06-04 PROCEDURE — 25000003 PHARM REV CODE 250: Performed by: INTERNAL MEDICINE

## 2018-06-04 PROCEDURE — 96413 CHEMO IV INFUSION 1 HR: CPT

## 2018-06-04 PROCEDURE — 96417 CHEMO IV INFUS EACH ADDL SEQ: CPT

## 2018-06-04 PROCEDURE — 63600175 PHARM REV CODE 636 W HCPCS: Performed by: INTERNAL MEDICINE

## 2018-06-04 PROCEDURE — 96367 TX/PROPH/DG ADDL SEQ IV INF: CPT

## 2018-06-04 PROCEDURE — S0028 INJECTION, FAMOTIDINE, 20 MG: HCPCS | Performed by: INTERNAL MEDICINE

## 2018-06-04 RX ORDER — FAMOTIDINE 10 MG/ML
20 INJECTION INTRAVENOUS
Status: DISCONTINUED | OUTPATIENT
Start: 2018-06-04 | End: 2018-06-04

## 2018-06-04 RX ORDER — EPINEPHRINE 0.3 MG/.3ML
0.3 INJECTION SUBCUTANEOUS ONCE AS NEEDED
Status: CANCELLED | OUTPATIENT
Start: 2018-06-18 | End: 2018-06-18

## 2018-06-04 RX ORDER — FAMOTIDINE 10 MG/ML
20 INJECTION INTRAVENOUS
Status: CANCELLED | OUTPATIENT
Start: 2018-06-11

## 2018-06-04 RX ORDER — SODIUM CHLORIDE 0.9 % (FLUSH) 0.9 %
10 SYRINGE (ML) INJECTION
Status: DISCONTINUED | OUTPATIENT
Start: 2018-06-04 | End: 2018-06-04 | Stop reason: HOSPADM

## 2018-06-04 RX ORDER — DIPHENHYDRAMINE HYDROCHLORIDE 50 MG/ML
50 INJECTION INTRAMUSCULAR; INTRAVENOUS ONCE AS NEEDED
Status: CANCELLED | OUTPATIENT
Start: 2018-06-18 | End: 2018-06-18

## 2018-06-04 RX ORDER — FAMOTIDINE 20 MG/50ML
20 INJECTION, SOLUTION INTRAVENOUS
Status: COMPLETED | OUTPATIENT
Start: 2018-06-04 | End: 2018-06-04

## 2018-06-04 RX ORDER — HEPARIN 100 UNIT/ML
500 SYRINGE INTRAVENOUS
Status: CANCELLED | OUTPATIENT
Start: 2018-06-18

## 2018-06-04 RX ORDER — FAMOTIDINE 10 MG/ML
20 INJECTION INTRAVENOUS
Status: CANCELLED | OUTPATIENT
Start: 2018-06-18

## 2018-06-04 RX ORDER — SODIUM CHLORIDE 0.9 % (FLUSH) 0.9 %
10 SYRINGE (ML) INJECTION
Status: CANCELLED | OUTPATIENT
Start: 2018-06-04

## 2018-06-04 RX ORDER — SODIUM CHLORIDE 0.9 % (FLUSH) 0.9 %
10 SYRINGE (ML) INJECTION
Status: CANCELLED | OUTPATIENT
Start: 2018-06-11

## 2018-06-04 RX ORDER — EPINEPHRINE 0.3 MG/.3ML
0.3 INJECTION SUBCUTANEOUS ONCE AS NEEDED
Status: CANCELLED | OUTPATIENT
Start: 2018-06-04 | End: 2018-06-04

## 2018-06-04 RX ORDER — HEPARIN 100 UNIT/ML
500 SYRINGE INTRAVENOUS
Status: DISCONTINUED | OUTPATIENT
Start: 2018-06-04 | End: 2018-06-04 | Stop reason: HOSPADM

## 2018-06-04 RX ORDER — EPINEPHRINE 0.3 MG/.3ML
0.3 INJECTION SUBCUTANEOUS ONCE AS NEEDED
Status: CANCELLED | OUTPATIENT
Start: 2018-06-11 | End: 2018-06-11

## 2018-06-04 RX ORDER — DIPHENHYDRAMINE HYDROCHLORIDE 50 MG/ML
50 INJECTION INTRAMUSCULAR; INTRAVENOUS ONCE AS NEEDED
Status: CANCELLED | OUTPATIENT
Start: 2018-06-11 | End: 2018-06-11

## 2018-06-04 RX ORDER — HEPARIN 100 UNIT/ML
500 SYRINGE INTRAVENOUS
Status: CANCELLED | OUTPATIENT
Start: 2018-06-11

## 2018-06-04 RX ORDER — DIPHENHYDRAMINE HYDROCHLORIDE 50 MG/ML
50 INJECTION INTRAMUSCULAR; INTRAVENOUS ONCE AS NEEDED
Status: CANCELLED | OUTPATIENT
Start: 2018-06-04 | End: 2018-06-04

## 2018-06-04 RX ORDER — HEPARIN 100 UNIT/ML
500 SYRINGE INTRAVENOUS
Status: CANCELLED | OUTPATIENT
Start: 2018-06-04

## 2018-06-04 RX ORDER — SODIUM CHLORIDE 0.9 % (FLUSH) 0.9 %
10 SYRINGE (ML) INJECTION
Status: CANCELLED | OUTPATIENT
Start: 2018-06-18

## 2018-06-04 RX ADMIN — CARBOPLATIN 630 MG: 10 INJECTION, SOLUTION INTRAVENOUS at 10:06

## 2018-06-04 RX ADMIN — FAMOTIDINE 20 MG: 20 INJECTION, SOLUTION INTRAVENOUS at 09:06

## 2018-06-04 RX ADMIN — DIPHENHYDRAMINE HYDROCHLORIDE 50 MG: 50 INJECTION INTRAMUSCULAR; INTRAVENOUS at 08:06

## 2018-06-04 RX ADMIN — HEPARIN 500 UNITS: 100 SYRINGE at 11:06

## 2018-06-04 RX ADMIN — PALONOSETRON HYDROCHLORIDE: 0.25 INJECTION INTRAVENOUS at 08:06

## 2018-06-04 RX ADMIN — SODIUM CHLORIDE: 0.9 INJECTION, SOLUTION INTRAVENOUS at 10:06

## 2018-06-04 RX ADMIN — PACLITAXEL 190 MG: 100 INJECTION, POWDER, LYOPHILIZED, FOR SUSPENSION INTRAVENOUS at 10:06

## 2018-06-04 NOTE — PLAN OF CARE
Problem: Patient Care Overview  Goal: Plan of Care Review  Outcome: Ongoing (interventions implemented as appropriate)  Pt completed chemo. Tolerated well. Discharged home, ambulated unassisted out of unit

## 2018-06-04 NOTE — PLAN OF CARE
Problem: Chemotherapy Effects (Adult)  Goal: Signs and Symptoms of Listed Potential Problems Will be Absent, Minimized or Managed (Chemotherapy Effects)  Signs and symptoms of listed potential problems will be absent, minimized or managed by discharge/transition of care (reference Chemotherapy Effects (Adult) CPG).   Outcome: Ongoing (interventions implemented as appropriate)  Pt admitted for chemo infusion. Side effects of chemo reviewed with Patient, she verbalized understanding Pt ambulated unassisted to unit. .

## 2018-06-11 ENCOUNTER — INFUSION (OUTPATIENT)
Dept: INFUSION THERAPY | Facility: HOSPITAL | Age: 73
End: 2018-06-11
Attending: INTERNAL MEDICINE
Payer: MEDICARE

## 2018-06-11 ENCOUNTER — HOSPITAL ENCOUNTER (OUTPATIENT)
Facility: HOSPITAL | Age: 73
Discharge: HOME | End: 2018-06-12
Attending: EMERGENCY MEDICINE | Admitting: INTERNAL MEDICINE
Payer: MEDICARE

## 2018-06-11 VITALS
OXYGEN SATURATION: 100 % | TEMPERATURE: 98 F | HEART RATE: 69 BPM | DIASTOLIC BLOOD PRESSURE: 57 MMHG | RESPIRATION RATE: 17 BRPM | SYSTOLIC BLOOD PRESSURE: 119 MMHG

## 2018-06-11 DIAGNOSIS — D64.9 SEVERE ANEMIA: Primary | ICD-10-CM

## 2018-06-11 DIAGNOSIS — C34.90 SQUAMOUS CELL CARCINOMA OF LUNG, UNSPECIFIED LATERALITY: Primary | ICD-10-CM

## 2018-06-11 DIAGNOSIS — R10.11 RIGHT UPPER QUADRANT ABDOMINAL PAIN: ICD-10-CM

## 2018-06-11 DIAGNOSIS — D72.819 LEUKOPENIA, UNSPECIFIED TYPE: ICD-10-CM

## 2018-06-11 DIAGNOSIS — T45.1X5A CHEMOTHERAPY-INDUCED NEUTROPENIA: ICD-10-CM

## 2018-06-11 DIAGNOSIS — R06.09 EXERTIONAL DYSPNEA: ICD-10-CM

## 2018-06-11 DIAGNOSIS — D70.1 CHEMOTHERAPY-INDUCED NEUTROPENIA: ICD-10-CM

## 2018-06-11 LAB
ABO + RH BLD: NORMAL
ALBUMIN SERPL BCP-MCNC: 3.5 G/DL
ALP SERPL-CCNC: 34 U/L
ALT SERPL W/O P-5'-P-CCNC: 14 U/L
ANION GAP SERPL CALC-SCNC: 8 MMOL/L
AST SERPL-CCNC: 20 U/L
BILIRUB SERPL-MCNC: 1 MG/DL
BILIRUB UR QL STRIP: NEGATIVE
BILIRUB UR QL STRIP: NEGATIVE
BLD GP AB SCN CELLS X3 SERPL QL: NORMAL
BNP SERPL-MCNC: 86 PG/ML
BUN SERPL-MCNC: 20 MG/DL
CALCIUM SERPL-MCNC: 8 MG/DL
CHLORIDE SERPL-SCNC: 109 MMOL/L
CLARITY UR: CLEAR
CLARITY UR: CLEAR
CO2 SERPL-SCNC: 21 MMOL/L
COLOR UR: YELLOW
COLOR UR: YELLOW
CREAT SERPL-MCNC: 0.9 MG/DL
EST. GFR  (AFRICAN AMERICAN): >60 ML/MIN/1.73 M^2
EST. GFR  (NON AFRICAN AMERICAN): >60 ML/MIN/1.73 M^2
GLUCOSE SERPL-MCNC: 141 MG/DL
GLUCOSE UR QL STRIP: NEGATIVE
GLUCOSE UR QL STRIP: NEGATIVE
HGB UR QL STRIP: NEGATIVE
HGB UR QL STRIP: NEGATIVE
KETONES UR QL STRIP: NEGATIVE
KETONES UR QL STRIP: NEGATIVE
LACTATE SERPL-SCNC: 1.9 MMOL/L
LEUKOCYTE ESTERASE UR QL STRIP: ABNORMAL
LEUKOCYTE ESTERASE UR QL STRIP: NEGATIVE
LIPASE SERPL-CCNC: 26 U/L
MAGNESIUM SERPL-MCNC: 1.5 MG/DL
MICROSCOPIC COMMENT: NORMAL
NITRITE UR QL STRIP: NEGATIVE
NITRITE UR QL STRIP: NEGATIVE
PH UR STRIP: 6 [PH] (ref 5–8)
PH UR STRIP: 6 [PH] (ref 5–8)
POTASSIUM SERPL-SCNC: 4 MMOL/L
PROT SERPL-MCNC: 5.7 G/DL
PROT UR QL STRIP: NEGATIVE
PROT UR QL STRIP: NEGATIVE
SODIUM SERPL-SCNC: 138 MMOL/L
SP GR UR STRIP: 1.01 (ref 1–1.03)
SP GR UR STRIP: 1.01 (ref 1–1.03)
SQUAMOUS #/AREA URNS HPF: NORMAL /HPF
TROPONIN I SERPL DL<=0.01 NG/ML-MCNC: <0.006 NG/ML
URN SPEC COLLECT METH UR: ABNORMAL
URN SPEC COLLECT METH UR: ABNORMAL
UROBILINOGEN UR STRIP-ACNC: ABNORMAL EU/DL
UROBILINOGEN UR STRIP-ACNC: ABNORMAL EU/DL
WBC #/AREA URNS HPF: 5 /HPF (ref 0–5)

## 2018-06-11 PROCEDURE — 83880 ASSAY OF NATRIURETIC PEPTIDE: CPT

## 2018-06-11 PROCEDURE — 85027 COMPLETE CBC AUTOMATED: CPT

## 2018-06-11 PROCEDURE — 63600175 PHARM REV CODE 636 W HCPCS: Performed by: EMERGENCY MEDICINE

## 2018-06-11 PROCEDURE — 96367 TX/PROPH/DG ADDL SEQ IV INF: CPT

## 2018-06-11 PROCEDURE — 85007 BL SMEAR W/DIFF WBC COUNT: CPT

## 2018-06-11 PROCEDURE — 99285 EMERGENCY DEPT VISIT HI MDM: CPT

## 2018-06-11 PROCEDURE — 96372 THER/PROPH/DIAG INJ SC/IM: CPT

## 2018-06-11 PROCEDURE — G0378 HOSPITAL OBSERVATION PER HR: HCPCS

## 2018-06-11 PROCEDURE — 81000 URINALYSIS NONAUTO W/SCOPE: CPT

## 2018-06-11 PROCEDURE — 36430 TRANSFUSION BLD/BLD COMPNT: CPT

## 2018-06-11 PROCEDURE — 86920 COMPATIBILITY TEST SPIN: CPT

## 2018-06-11 PROCEDURE — 25000003 PHARM REV CODE 250: Performed by: INTERNAL MEDICINE

## 2018-06-11 PROCEDURE — 25000003 PHARM REV CODE 250: Performed by: HOSPITALIST

## 2018-06-11 PROCEDURE — 63600175 PHARM REV CODE 636 W HCPCS: Performed by: INTERNAL MEDICINE

## 2018-06-11 PROCEDURE — 86901 BLOOD TYPING SEROLOGIC RH(D): CPT

## 2018-06-11 PROCEDURE — 93010 ELECTROCARDIOGRAM REPORT: CPT | Mod: ,,, | Performed by: INTERNAL MEDICINE

## 2018-06-11 PROCEDURE — 21400001 HC TELEMETRY ROOM

## 2018-06-11 PROCEDURE — 27201040 HC RC 50 FILTER

## 2018-06-11 PROCEDURE — 83690 ASSAY OF LIPASE: CPT

## 2018-06-11 PROCEDURE — P9021 RED BLOOD CELLS UNIT: HCPCS

## 2018-06-11 PROCEDURE — S0028 INJECTION, FAMOTIDINE, 20 MG: HCPCS | Performed by: INTERNAL MEDICINE

## 2018-06-11 PROCEDURE — 25000242 PHARM REV CODE 250 ALT 637 W/ HCPCS: Performed by: INTERNAL MEDICINE

## 2018-06-11 PROCEDURE — 83605 ASSAY OF LACTIC ACID: CPT

## 2018-06-11 PROCEDURE — 83735 ASSAY OF MAGNESIUM: CPT

## 2018-06-11 PROCEDURE — 25000003 PHARM REV CODE 250: Performed by: EMERGENCY MEDICINE

## 2018-06-11 PROCEDURE — 80053 COMPREHEN METABOLIC PANEL: CPT

## 2018-06-11 PROCEDURE — 84484 ASSAY OF TROPONIN QUANT: CPT

## 2018-06-11 PROCEDURE — 81003 URINALYSIS AUTO W/O SCOPE: CPT

## 2018-06-11 PROCEDURE — 96413 CHEMO IV INFUSION 1 HR: CPT

## 2018-06-11 PROCEDURE — 93005 ELECTROCARDIOGRAM TRACING: CPT

## 2018-06-11 PROCEDURE — 94640 AIRWAY INHALATION TREATMENT: CPT

## 2018-06-11 RX ORDER — ASPIRIN 325 MG
325 TABLET, DELAYED RELEASE (ENTERIC COATED) ORAL DAILY
Status: DISCONTINUED | OUTPATIENT
Start: 2018-06-12 | End: 2018-06-12 | Stop reason: HOSPADM

## 2018-06-11 RX ORDER — ROSUVASTATIN CALCIUM 10 MG/1
10 TABLET, COATED ORAL DAILY
Status: COMPLETED | OUTPATIENT
Start: 2018-06-11 | End: 2018-06-11

## 2018-06-11 RX ORDER — HYDROCODONE BITARTRATE AND ACETAMINOPHEN 5; 325 MG/1; MG/1
1 TABLET ORAL EVERY 6 HOURS PRN
Status: DISCONTINUED | OUTPATIENT
Start: 2018-06-11 | End: 2018-06-12 | Stop reason: HOSPADM

## 2018-06-11 RX ORDER — FAMOTIDINE 20 MG/50ML
20 INJECTION, SOLUTION INTRAVENOUS
Status: COMPLETED | OUTPATIENT
Start: 2018-06-11 | End: 2018-06-11

## 2018-06-11 RX ORDER — ENOXAPARIN SODIUM 100 MG/ML
40 INJECTION SUBCUTANEOUS EVERY 24 HOURS
Status: DISCONTINUED | OUTPATIENT
Start: 2018-06-11 | End: 2018-06-12 | Stop reason: HOSPADM

## 2018-06-11 RX ORDER — FAMOTIDINE 10 MG/ML
20 INJECTION INTRAVENOUS
Status: DISCONTINUED | OUTPATIENT
Start: 2018-06-11 | End: 2018-06-11

## 2018-06-11 RX ORDER — NITROGLYCERIN 0.3 MG/1
0.3 TABLET SUBLINGUAL EVERY 5 MIN PRN
Status: DISCONTINUED | OUTPATIENT
Start: 2018-06-11 | End: 2018-06-12 | Stop reason: HOSPADM

## 2018-06-11 RX ORDER — ONDANSETRON 2 MG/ML
4 INJECTION INTRAMUSCULAR; INTRAVENOUS EVERY 8 HOURS PRN
Status: DISCONTINUED | OUTPATIENT
Start: 2018-06-11 | End: 2018-06-12

## 2018-06-11 RX ORDER — ISOSORBIDE MONONITRATE 30 MG/1
30 TABLET, EXTENDED RELEASE ORAL DAILY
Status: DISCONTINUED | OUTPATIENT
Start: 2018-06-12 | End: 2018-06-12

## 2018-06-11 RX ORDER — HEPARIN 100 UNIT/ML
500 SYRINGE INTRAVENOUS
Status: DISCONTINUED | OUTPATIENT
Start: 2018-06-11 | End: 2018-06-11 | Stop reason: HOSPADM

## 2018-06-11 RX ORDER — ROSUVASTATIN CALCIUM 10 MG/1
10 TABLET, COATED ORAL DAILY
Status: DISCONTINUED | OUTPATIENT
Start: 2018-06-12 | End: 2018-06-12 | Stop reason: HOSPADM

## 2018-06-11 RX ORDER — ALBUTEROL SULFATE 2.5 MG/.5ML
2.5 SOLUTION RESPIRATORY (INHALATION) EVERY 4 HOURS
Status: DISCONTINUED | OUTPATIENT
Start: 2018-06-11 | End: 2018-06-12 | Stop reason: HOSPADM

## 2018-06-11 RX ORDER — METOPROLOL TARTRATE 25 MG/1
25 TABLET, FILM COATED ORAL 2 TIMES DAILY
Status: DISCONTINUED | OUTPATIENT
Start: 2018-06-11 | End: 2018-06-12

## 2018-06-11 RX ORDER — POLYETHYLENE GLYCOL 3350 17 G/17G
17 POWDER, FOR SOLUTION ORAL DAILY
Status: DISCONTINUED | OUTPATIENT
Start: 2018-06-12 | End: 2018-06-12 | Stop reason: HOSPADM

## 2018-06-11 RX ORDER — MORPHINE SULFATE 10 MG/ML
4 INJECTION INTRAMUSCULAR; INTRAVENOUS; SUBCUTANEOUS EVERY 4 HOURS PRN
Status: DISCONTINUED | OUTPATIENT
Start: 2018-06-11 | End: 2018-06-12 | Stop reason: HOSPADM

## 2018-06-11 RX ORDER — SODIUM CHLORIDE 0.9 % (FLUSH) 0.9 %
10 SYRINGE (ML) INJECTION
Status: DISCONTINUED | OUTPATIENT
Start: 2018-06-11 | End: 2018-06-11 | Stop reason: HOSPADM

## 2018-06-11 RX ORDER — ALBUTEROL SULFATE 2.5 MG/.5ML
2.5 SOLUTION RESPIRATORY (INHALATION) EVERY 4 HOURS
Status: DISCONTINUED | OUTPATIENT
Start: 2018-06-11 | End: 2018-06-11

## 2018-06-11 RX ORDER — PROCHLORPERAZINE MALEATE 5 MG
10 TABLET ORAL EVERY 6 HOURS PRN
Status: DISCONTINUED | OUTPATIENT
Start: 2018-06-11 | End: 2018-06-12 | Stop reason: HOSPADM

## 2018-06-11 RX ORDER — HYDROCODONE BITARTRATE AND ACETAMINOPHEN 500; 5 MG/1; MG/1
TABLET ORAL
Status: DISCONTINUED | OUTPATIENT
Start: 2018-06-11 | End: 2018-06-12 | Stop reason: HOSPADM

## 2018-06-11 RX ORDER — TIOTROPIUM BROMIDE 18 UG/1
1 CAPSULE ORAL; RESPIRATORY (INHALATION) DAILY
Status: DISCONTINUED | OUTPATIENT
Start: 2018-06-12 | End: 2018-06-12 | Stop reason: HOSPADM

## 2018-06-11 RX ORDER — ASCORBIC ACID 500 MG
500 TABLET ORAL DAILY
Status: DISCONTINUED | OUTPATIENT
Start: 2018-06-12 | End: 2018-06-12 | Stop reason: HOSPADM

## 2018-06-11 RX ADMIN — PALONOSETRON HYDROCHLORIDE: 0.25 INJECTION INTRAVENOUS at 08:06

## 2018-06-11 RX ADMIN — PACLITAXEL 190 MG: 100 INJECTION, POWDER, LYOPHILIZED, FOR SUSPENSION INTRAVENOUS at 09:06

## 2018-06-11 RX ADMIN — ENOXAPARIN SODIUM 40 MG: 100 INJECTION SUBCUTANEOUS at 08:06

## 2018-06-11 RX ADMIN — ALBUTEROL SULFATE 2.5 MG: 2.5 SOLUTION RESPIRATORY (INHALATION) at 08:06

## 2018-06-11 RX ADMIN — METOPROLOL TARTRATE 25 MG: 25 TABLET ORAL at 11:06

## 2018-06-11 RX ADMIN — ROSUVASTATIN CALCIUM 10 MG: 10 TABLET, FILM COATED ORAL at 11:06

## 2018-06-11 RX ADMIN — FAMOTIDINE 20 MG: 20 INJECTION, SOLUTION INTRAVENOUS at 09:06

## 2018-06-11 RX ADMIN — DIPHENHYDRAMINE HYDROCHLORIDE 50 MG: 50 INJECTION INTRAMUSCULAR; INTRAVENOUS at 08:06

## 2018-06-11 NOTE — ED TRIAGE NOTES
"72 y.o. Female presents to the ED with chief complaint of abdominal pain radiating to back. Patient reports abdominal pain x1 week with associated shortness of breath. Per patient, "I just camee from Chemo treatment and they left a port in for you all to get blood work done." Patient has history of stents in heart. Patient denies recent injury, fever, or chills. Patient resting in bed in NAD with side rails up x2  "

## 2018-06-11 NOTE — PLAN OF CARE
Problem: Patient Care Overview  Goal: Individualization & Mutuality  Outcome: Ongoing (interventions implemented as appropriate)  Pt tolerated Abraxane well. Pt c/o ongoing abdominal pain x2 weeks. Dr Holliday notified and pt sent to ED. Pt brought down in wheelchair.

## 2018-06-11 NOTE — ED PROVIDER NOTES
"Encounter Date: 6/11/2018    SCRIBE #1 NOTE: I, Terencemark Miner, am scribing for, and in the presence of,  Dorian Centeno MD. I have scribed the following portions of the note - Other sections scribed: HPI and ROS.       History     Chief Complaint   Patient presents with    Abdominal Pain     bilateral abd side pain that wraps around to front and back upon ambulation. "it happens when I walk about 5 feet, take my breath away."  Chemo pt.      CC: Abdominal Pain    HPI: This is a 72 y.o. F who has HTN, HLD, COPD, PUD, CAD, Cervical cancer, Breast cancer, Lung cancer, Chronic Bronchitis, Emphysema of lung, Hx of MI, and Hx of heart artery stent x 2 who presents to the ED c/o acute onset of 1 week old bilateral lower abdominal pain that radiates to the back. Pt's abdominal pain is worse in the RLQ. She describes the abdominal pain as an aching pain. Pt symptoms are intermittent upon ambulating and causes weakness to bilateral lower extremity. She also notes urinary frequency, dark urine, and constipation. Her last BM was this morning, in which stool was hard. She attributes the constipation to chemotherapy and states that she takes a Laxative as needed. She has an additional complaint of SOB on exertion. Pt reports SOB with taking 4 steps. She is on 2.5 liters of oxygen at home as needed. She received chemotherapy 30 minutes PTA and reported her symptoms while there. She was instructed by her Oncologist to report to the ED for evaluation of the abdominal pain. Pt has had previous episodes of abdominal pain and states that the current episode is worse than usual and not similar to PUD. Pt denies fever, chills, ear pain, sore throat, neck pain, CP, cough, nausea, vomiting, diarrhea, dysuria, hematuria, arm or leg problem, or rash.       The history is provided by the patient. No  was used.     Review of patient's allergies indicates:  No Known Allergies  Past Medical History:   Diagnosis Date    " Acute respiratory failure with hypoxia and hypercapnia 11/29/2017    Angina pectoris     Arthritis     Bell's palsy     left facial weakness    Breast cancer     RIGHT    CAD (coronary artery disease)     Cervical cancer     Chronic bronchitis     COPD (chronic obstructive pulmonary disease)     Dr. Katz    Dental bridge present     Emphysema of lung     H/O colonoscopy 06/2017    due for repeat colonsocopy in 6/2018    History of heart artery stent     Dr. Ortiz  x2 stents    Hyperlipidemia     Hypertension     Lung cancer     Myocardial infarction     SUNITHA (obstructive sleep apnea)     intolerant to mask    Pneumonia     Pneumonia due to other staphylococcus     PUD (peptic ulcer disease)     Sleep apnea     Vaginal delivery     x1     Past Surgical History:   Procedure Laterality Date    ADENOIDECTOMY      BREAST BIOPSY Right     x3    BREAST SURGERY      lumpectomy right side     CERVIX SURGERY      cone    COLONOSCOPY N/A 3/17/2017    Procedure: COLONOSCOPY;  Surgeon: Julio Rudd MD;  Location: [facility] ENDO;  Service: Endoscopy;  Laterality: N/A;    COLONOSCOPY N/A 6/30/2017    Procedure: COLONOSCOPY;  Surgeon: Julio Rudd MD;  Location: [facility] ENDO;  Service: Endoscopy;  Laterality: N/A;    sweat glands axillary regions      TONSILLECTOMY       Family History   Problem Relation Age of Onset    Cancer Mother         breast    Heart disease Mother     Breast cancer Mother     Cancer Father         lung-smoker     Cancer Sister         lung-smoker     Heart attack Sister     Cancer Maternal Grandmother     Heart disease Maternal Grandmother     Cancer Maternal Grandfather     Heart disease Maternal Grandfather     Cancer Paternal Grandmother     Cancer Paternal Grandfather     Cancer Sister         mets not sure where it started      Social History   Substance Use Topics    Smoking status: Former Smoker     Packs/day: 0.25     Years: 50.00     Types: Cigarettes     Quit  date: 8/23/2009    Smokeless tobacco: Never Used    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   HENT: Negative for ear pain and sore throat.    Eyes: Negative for pain.   Respiratory: Negative for cough and shortness of breath.    Cardiovascular: Negative for chest pain.   Gastrointestinal: Positive for abdominal pain and constipation. Negative for diarrhea, nausea and vomiting.   Genitourinary: Positive for frequency. Negative for dysuria and hematuria.        (+) Dark urine   Musculoskeletal: Positive for back pain.        (-) arm or leg problems   Skin: Negative for rash.   Neurological: Positive for weakness (in BLE). Negative for headaches.       Physical Exam     Initial Vitals [06/11/18 1144]   BP Pulse Resp Temp SpO2   (!) 143/70 (!) 57 16 98.2 °F (36.8 °C) 100 %      MAP       --         Physical Exam  The patient was examined specifically for the following:   General:No significant distress, Good color, Warm and dry. Head and neck:Scalp atraumatic, Neck supple. Neurological:Appropriate conversation, Gross motor deficits. Eyes:Conjugate gaze, Clear corneas. ENT: No epistaxis. Cardiac: Regular rate and rhythm, Grossly normal heart tones. Pulmonary: Wheezing, Rales. Gastrointestinal: Abdominal tenderness, Abdominal distention. Musculoskeletal: Extremity deformity, Apparent pain with range of motion of the joints. Skin: Rash.   The findings on examination were normal except for the following:  The patient is pale.  There is no evidence of respiratory distress.  The lungs are clear.  The patient is afebrile.  Heart tones are normal.  The patient has regular rate and rhythm.  The abdomen is nontender.  There is no guarding rebound mass or distention.  ED Course   Critical Care  Performed by: BUCK RUSSO  Authorized by: MATT MCNEIL   Direct patient critical care time: 22 minutes  Additional history critical care time: 11  minutes  Ordering / reviewing critical care time: 11 minutes  Documentation critical care time: 17 minutes  Consulting other physicians critical care time: 12 minutes  Consult with family critical care time: 5 minutes  Total critical care time (exclusive of procedural time) : 78 minutes  Critical care time was exclusive of separately billable procedures and treating other patients and teaching time.  Critical care was necessary to treat or prevent imminent or life-threatening deterioration of the following conditions: circulatory failure and metabolic crisis.  Critical care was time spent personally by me on the following activities: discussions with primary provider, evaluation of patient's response to treatment, examination of patient, obtaining history from patient or surrogate, ordering and review of laboratory studies, ordering and performing treatments and interventions, ordering and review of radiographic studies, pulse oximetry, re-evaluation of patient's condition and review of old charts.        Labs Reviewed   COMPREHENSIVE METABOLIC PANEL - Abnormal; Notable for the following:        Result Value    CO2 21 (*)     Glucose 141 (*)     Calcium 8.0 (*)     Total Protein 5.7 (*)     Alkaline Phosphatase 34 (*)     All other components within normal limits   CBC W/ AUTO DIFFERENTIAL - Abnormal; Notable for the following:     WBC 0.91 (*)     RBC 1.54 (*)     Hemoglobin 4.9 (*)     Hematocrit 14.3 (*)     MCH 31.8 (*)     RDW 19.0 (*)     All other components within normal limits   URINALYSIS, REFLEX TO URINE CULTURE - Abnormal; Notable for the following:     Urobilinogen, UA 2.0-3.0 (*)     All other components within normal limits    Narrative:     Preferred Collection Type->Urine, Clean Catch   MAGNESIUM - Abnormal; Notable for the following:     Magnesium 1.5 (*)     All other components within normal limits   LIPASE   LACTIC ACID, PLASMA   TROPONIN I   B-TYPE NATRIURETIC PEPTIDE   TYPE & SCREEN     EKG  Readings: (Independently Interpreted)   This patient is in a sinus bradycardia with a heart rate of 52.  The p.r. QRS and QT intervals are normal.  There is no evidence of acute myocardial infarction or malignant arrhythmia.       X-Ray Chest AP Portable   Final Result      Abnormal right lung opacity correlating with known tumor.  No acute change.         Electronically signed by: Tito Dominguez MD   Date:    06/11/2018   Time:    13:57      US Abdomen Limited    (Results Pending)     X-Rays:   Independently Interpreted Readings:   Other Readings:  Chest x-ray reveals a right lung tumor.    Medical decision making:  Given the above this patient presents to the emergency with weakness and exertional dyspnea.  She has a hemoglobin of 4.9.  She also has white blood count of 910.  She will be admitted to the hospital for transfusion.  Oncology will be consulted.  We will have the patient evaluated by Hospital Medicine.  An ultrasound is pending at this time.  I wait the results of the differential.  I believe this patient will be neutropenic.  The CBC differential is pending.  I discussed this case with Neftali Renee who feels that the patient required will require admission and transfusion.  I will transfuse 3 units of packed red blood cells.  I will order reverse isolation.  I will consult Oncology.             Scribe Attestation:   Scribe #1: I performed the above scribed service and the documentation accurately describes the services I performed. I attest to the accuracy of the note.    Attending Attestation:           Physician Attestation for Scribe:  Physician Attestation Statement for Scribe #1: I, Dorian Centeno MD, reviewed documentation, as scribed by Terence Miner in my presence, and it is both accurate and complete.                    Clinical Impression:   The primary encounter diagnosis was Severe anemia. Diagnoses of Leukopenia, unspecified type, Chemotherapy-induced neutropenia, Exertional  dyspnea, and Right upper quadrant abdominal pain were also pertinent to this visit.                             Dorian Centeno MD  06/11/18 1457       Dorian Centeno MD  06/24/18 0126

## 2018-06-12 VITALS
HEIGHT: 63 IN | TEMPERATURE: 98 F | RESPIRATION RATE: 18 BRPM | OXYGEN SATURATION: 97 % | BODY MASS INDEX: 30.12 KG/M2 | SYSTOLIC BLOOD PRESSURE: 127 MMHG | DIASTOLIC BLOOD PRESSURE: 73 MMHG | WEIGHT: 170 LBS | HEART RATE: 62 BPM

## 2018-06-12 PROBLEM — D64.81 ANTINEOPLASTIC CHEMOTHERAPY INDUCED ANEMIA: Status: ACTIVE | Noted: 2018-06-12

## 2018-06-12 PROBLEM — T45.1X5A CHEMOTHERAPY INDUCED NEUTROPENIA: Status: ACTIVE | Noted: 2018-06-12

## 2018-06-12 PROBLEM — E83.42 HYPOMAGNESEMIA: Status: RESOLVED | Noted: 2018-06-12 | Resolved: 2018-06-12

## 2018-06-12 PROBLEM — D70.1 CHEMOTHERAPY INDUCED NEUTROPENIA: Status: ACTIVE | Noted: 2018-06-12

## 2018-06-12 PROBLEM — T45.1X5A ANTINEOPLASTIC CHEMOTHERAPY INDUCED ANEMIA: Status: ACTIVE | Noted: 2018-06-12

## 2018-06-12 PROBLEM — E83.42 HYPOMAGNESEMIA: Status: ACTIVE | Noted: 2018-06-12

## 2018-06-12 PROBLEM — C34.91 SQUAMOUS CELL CARCINOMA OF RIGHT LUNG: Chronic | Status: ACTIVE | Noted: 2018-05-07

## 2018-06-12 LAB
ALBUMIN SERPL BCP-MCNC: 4 G/DL
ALP SERPL-CCNC: 40 U/L
ALT SERPL W/O P-5'-P-CCNC: 18 U/L
ANION GAP SERPL CALC-SCNC: 12 MMOL/L
AST SERPL-CCNC: 23 U/L
BASOPHILS # BLD AUTO: 0.01 K/UL
BASOPHILS # BLD AUTO: ABNORMAL K/UL
BASOPHILS NFR BLD: 0 %
BASOPHILS NFR BLD: 0.4 %
BILIRUB SERPL-MCNC: 1.8 MG/DL
BLD PROD TYP BPU: NORMAL
BLOOD UNIT EXPIRATION DATE: NORMAL
BLOOD UNIT TYPE CODE: 6200
BLOOD UNIT TYPE: NORMAL
BUN SERPL-MCNC: 18 MG/DL
CALCIUM SERPL-MCNC: 9.3 MG/DL
CHLORIDE SERPL-SCNC: 108 MMOL/L
CO2 SERPL-SCNC: 21 MMOL/L
CODING SYSTEM: NORMAL
CREAT SERPL-MCNC: 0.9 MG/DL
DIFFERENTIAL METHOD: ABNORMAL
DIFFERENTIAL METHOD: ABNORMAL
DISPENSE STATUS: NORMAL
EOSINOPHIL # BLD AUTO: 0 K/UL
EOSINOPHIL # BLD AUTO: ABNORMAL K/UL
EOSINOPHIL NFR BLD: 0 %
EOSINOPHIL NFR BLD: 0 %
ERYTHROCYTE [DISTWIDTH] IN BLOOD BY AUTOMATED COUNT: 17.4 %
ERYTHROCYTE [DISTWIDTH] IN BLOOD BY AUTOMATED COUNT: 19 %
EST. GFR  (AFRICAN AMERICAN): >60 ML/MIN/1.73 M^2
EST. GFR  (NON AFRICAN AMERICAN): >60 ML/MIN/1.73 M^2
GLUCOSE SERPL-MCNC: 177 MG/DL
HCT VFR BLD AUTO: 14.3 %
HCT VFR BLD AUTO: 33.9 %
HGB BLD-MCNC: 12.3 G/DL
HGB BLD-MCNC: 4.9 G/DL
LYMPHOCYTES # BLD AUTO: 0.5 K/UL
LYMPHOCYTES # BLD AUTO: ABNORMAL K/UL
LYMPHOCYTES NFR BLD: 20.8 %
LYMPHOCYTES NFR BLD: 40 %
MAGNESIUM SERPL-MCNC: 2.2 MG/DL
MCH RBC QN AUTO: 31.1 PG
MCH RBC QN AUTO: 31.8 PG
MCHC RBC AUTO-ENTMCNC: 34.3 G/DL
MCHC RBC AUTO-ENTMCNC: 36.3 G/DL
MCV RBC AUTO: 86 FL
MCV RBC AUTO: 93 FL
MONOCYTES # BLD AUTO: 0.2 K/UL
MONOCYTES # BLD AUTO: ABNORMAL K/UL
MONOCYTES NFR BLD: 4 %
MONOCYTES NFR BLD: 8.1 %
NEUTROPHILS # BLD AUTO: 1.7 K/UL
NEUTROPHILS NFR BLD: 56 %
NEUTROPHILS NFR BLD: 70.7 %
PLATELET # BLD AUTO: 156 K/UL
PLATELET # BLD AUTO: 192 K/UL
PMV BLD AUTO: 10 FL
PMV BLD AUTO: 10.1 FL
POTASSIUM SERPL-SCNC: 3.7 MMOL/L
PROT SERPL-MCNC: 6.7 G/DL
RBC # BLD AUTO: 1.54 M/UL
RBC # BLD AUTO: 3.95 M/UL
SODIUM SERPL-SCNC: 141 MMOL/L
TRANS ERYTHROCYTES VOL PATIENT: NORMAL ML
WBC # BLD AUTO: 0.91 K/UL
WBC # BLD AUTO: 2.36 K/UL

## 2018-06-12 PROCEDURE — G0378 HOSPITAL OBSERVATION PER HR: HCPCS

## 2018-06-12 PROCEDURE — 27201040 HC RC 50 FILTER

## 2018-06-12 PROCEDURE — 63600175 PHARM REV CODE 636 W HCPCS: Performed by: HOSPITALIST

## 2018-06-12 PROCEDURE — 80053 COMPREHEN METABOLIC PANEL: CPT

## 2018-06-12 PROCEDURE — 94761 N-INVAS EAR/PLS OXIMETRY MLT: CPT

## 2018-06-12 PROCEDURE — 96372 THER/PROPH/DIAG INJ SC/IM: CPT

## 2018-06-12 PROCEDURE — P9021 RED BLOOD CELLS UNIT: HCPCS

## 2018-06-12 PROCEDURE — 94640 AIRWAY INHALATION TREATMENT: CPT

## 2018-06-12 PROCEDURE — 96366 THER/PROPH/DIAG IV INF ADDON: CPT

## 2018-06-12 PROCEDURE — 36430 TRANSFUSION BLD/BLD COMPNT: CPT

## 2018-06-12 PROCEDURE — 25000003 PHARM REV CODE 250: Performed by: HOSPITALIST

## 2018-06-12 PROCEDURE — 63600175 PHARM REV CODE 636 W HCPCS: Performed by: INTERNAL MEDICINE

## 2018-06-12 PROCEDURE — 25000242 PHARM REV CODE 250 ALT 637 W/ HCPCS: Performed by: EMERGENCY MEDICINE

## 2018-06-12 PROCEDURE — 99215 OFFICE O/P EST HI 40 MIN: CPT | Mod: ,,, | Performed by: INTERNAL MEDICINE

## 2018-06-12 PROCEDURE — 83735 ASSAY OF MAGNESIUM: CPT

## 2018-06-12 PROCEDURE — 85025 COMPLETE CBC W/AUTO DIFF WBC: CPT

## 2018-06-12 PROCEDURE — 96375 TX/PRO/DX INJ NEW DRUG ADDON: CPT

## 2018-06-12 PROCEDURE — 96365 THER/PROPH/DIAG IV INF INIT: CPT

## 2018-06-12 PROCEDURE — 25000003 PHARM REV CODE 250: Performed by: EMERGENCY MEDICINE

## 2018-06-12 PROCEDURE — 25000242 PHARM REV CODE 250 ALT 637 W/ HCPCS: Performed by: INTERNAL MEDICINE

## 2018-06-12 RX ORDER — HYDRALAZINE HYDROCHLORIDE 20 MG/ML
10 INJECTION INTRAMUSCULAR; INTRAVENOUS EVERY 6 HOURS PRN
Status: DISCONTINUED | OUTPATIENT
Start: 2018-06-12 | End: 2018-06-12 | Stop reason: HOSPADM

## 2018-06-12 RX ORDER — ONDANSETRON 2 MG/ML
8 INJECTION INTRAMUSCULAR; INTRAVENOUS EVERY 8 HOURS PRN
Status: DISCONTINUED | OUTPATIENT
Start: 2018-06-12 | End: 2018-06-12 | Stop reason: HOSPADM

## 2018-06-12 RX ORDER — MAGNESIUM SULFATE HEPTAHYDRATE 40 MG/ML
2 INJECTION, SOLUTION INTRAVENOUS ONCE
Status: COMPLETED | OUTPATIENT
Start: 2018-06-12 | End: 2018-06-12

## 2018-06-12 RX ORDER — ISOSORBIDE MONONITRATE 30 MG/1
30 TABLET, EXTENDED RELEASE ORAL DAILY
Status: DISCONTINUED | OUTPATIENT
Start: 2018-06-12 | End: 2018-06-12 | Stop reason: HOSPADM

## 2018-06-12 RX ORDER — ACETAMINOPHEN 325 MG/1
650 TABLET ORAL EVERY 6 HOURS PRN
Status: DISCONTINUED | OUTPATIENT
Start: 2018-06-12 | End: 2018-06-12 | Stop reason: HOSPADM

## 2018-06-12 RX ORDER — HEPARIN 100 UNIT/ML
5 SYRINGE INTRAVENOUS ONCE
Status: COMPLETED | OUTPATIENT
Start: 2018-06-12 | End: 2018-06-12

## 2018-06-12 RX ORDER — METOPROLOL TARTRATE 25 MG/1
25 TABLET, FILM COATED ORAL 2 TIMES DAILY
Status: DISCONTINUED | OUTPATIENT
Start: 2018-06-12 | End: 2018-06-12 | Stop reason: HOSPADM

## 2018-06-12 RX ADMIN — ALBUTEROL SULFATE 2.5 MG: 2.5 SOLUTION RESPIRATORY (INHALATION) at 01:06

## 2018-06-12 RX ADMIN — METOPROLOL TARTRATE 25 MG: 25 TABLET ORAL at 09:06

## 2018-06-12 RX ADMIN — ISOSORBIDE MONONITRATE 30 MG: 30 TABLET, EXTENDED RELEASE ORAL at 09:06

## 2018-06-12 RX ADMIN — ASPIRIN 325 MG: 325 TABLET, DELAYED RELEASE ORAL at 09:06

## 2018-06-12 RX ADMIN — ALBUTEROL SULFATE 2.5 MG: 2.5 SOLUTION RESPIRATORY (INHALATION) at 04:06

## 2018-06-12 RX ADMIN — ALBUTEROL SULFATE 2.5 MG: 2.5 SOLUTION RESPIRATORY (INHALATION) at 11:06

## 2018-06-12 RX ADMIN — Medication 500 UNITS: at 12:06

## 2018-06-12 RX ADMIN — ALBUTEROL SULFATE 2.5 MG: 2.5 SOLUTION RESPIRATORY (INHALATION) at 07:06

## 2018-06-12 RX ADMIN — HYDRALAZINE HYDROCHLORIDE 10 MG: 20 INJECTION INTRAMUSCULAR; INTRAVENOUS at 04:06

## 2018-06-12 RX ADMIN — POLYETHYLENE GLYCOL 3350 17 G: 17 POWDER, FOR SOLUTION ORAL at 09:06

## 2018-06-12 RX ADMIN — ACETAMINOPHEN 650 MG: 325 TABLET, FILM COATED ORAL at 03:06

## 2018-06-12 RX ADMIN — ROSUVASTATIN CALCIUM 10 MG: 10 TABLET, FILM COATED ORAL at 09:06

## 2018-06-12 RX ADMIN — OXYCODONE HYDROCHLORIDE AND ACETAMINOPHEN 500 MG: 500 TABLET ORAL at 09:06

## 2018-06-12 RX ADMIN — TIOTROPIUM BROMIDE 18 MCG: 18 CAPSULE ORAL; RESPIRATORY (INHALATION) at 07:06

## 2018-06-12 RX ADMIN — MAGNESIUM SULFATE IN WATER 2 G: 40 INJECTION, SOLUTION INTRAVENOUS at 06:06

## 2018-06-12 NOTE — PROGRESS NOTES
06/12/18 0754   Patient Assessment/Suction   Level of Consciousness (AVPU) alert   Respiratory Effort Normal;Unlabored   All Lung Fields Breath Sounds diminished   PRE-TX-O2-ETCO2   O2 Device (Oxygen Therapy) room air   SpO2 98 %   Pulse Oximetry Type Intermittent   $ Pulse Oximetry - Multiple Charge Pulse Oximetry - Multiple   Pulse 69   Resp 18   Aerosol Therapy   $ Aerosol Therapy Charges Aerosol Treatment   Respiratory Treatment Status given   SVN/Inhaler Treatment Route mask   Position During Treatment HOB at 30 degrees   Patient Tolerance good   Post-Treatment   Post-treatment Heart Rate (beats/min) 68   Post-treatment Resp Rate (breaths/min) 18   All Fields Breath Sounds unchanged

## 2018-06-12 NOTE — PROGRESS NOTES
WRITTEN HEALTHCARE DISCHARGE INFORMATION     Things that YOU are responsible for to Manage Your Care At Home:  1. Getting your prescriptions filled.  2. Taking you medications as directed. DO NOT MISS ANY DOSES!  3. Going to your follow-up doctor appointments. This is important because it allows the doctor to monitor your progress and to determine if any changes need to be made to your treatment plan.    If you are unable to make your follow up appointments, please call the number listed and reschedule this appointment.     After discharge, if you need assistance, you can call [facility] On Call Nurse Care Line for 24/7 assistance at [phone]    Thank you for choosing [facility] and allowing us to care for you.   From your care manager: Mercedes HARRIS,[place] @ [phone]     You should receive a call from [facility] Discharge Department within 48-72 hours to help manage your care after discharge. Please try to make sure that you answer your phone for this important phone call.     Follow-up Information     Jeannine Huitron MD On 6/20/2018.    Specialty:  Family Medicine  Why:  On Wednesday @ 9:20am, outpatient services  Contact information:  [address]  [place] [address]  [phone]             Mildred Holliday MD On 6/15/2018.    Specialties:  Hematology and Oncology, Hematology  Why:  On Friday @ 8:15am, outpatient services  Contact information:  [address] [place] [address]  [place] [address]  [phone]

## 2018-06-12 NOTE — CARE UPDATE
Ms. Castellano is a 73 yo woman with stage IV squamous cell lung cancer currently on chemo, breast cancer s/p lumpectomy and XRT 2014, remote cervical cancer, COPD, MI/CAD with stents x2, HTN, HLD, COPD, and PUD who presentd to the ED c/o acute onset of 1 week old bilateral lower abdominal pain that radiates to the back. The pain is associated with constipation, decreased urine output that is dark, and nausea. She had a bowel movement the day prior to admission and states her stool was hard.  Denies hematemesis, melena, hematochezia.  Denies hemoptysis or epistaxis.  No other obvious source of bleeding.  Pain worsened with ambulation and palpation.  Moderate to severe intensity.  No relief with bowel movement.  Daily frequency.  Constant duration.    In the emergency department routine laboratory studies were obtained which revealed evidence of severe anemia with hemoglobin of 4.9 as well as neutropenia with white blood cell count of 910, .  She was ordered to be transfused 3 units of packed red blood cells. Hem/Onc was consulted.    VSS, continue to monitor. Follow up post-transfusion CBC.

## 2018-06-12 NOTE — PLAN OF CARE
"TN reviewed follow up appointment information as well as  "GI discharge instructions" handout with patient using teach back. Patient stated she will notify the doctor if she has bleeding in her stools and stomach aches. Patient is in agreement and verbalized an understanding. Placed discharge information in blue discharge folder.  TN also reviewed patient responsibility checklist with her using teach back. Patient was able to verbalize her responsibilities after discharge to manage her care at home bein. Going to follow up appointments   2. Picking up rx from the pharmacy when discharged  3. Taking her medications as prescribed        18 1132   Final Note   Assessment Type Final Discharge Note   Discharge Disposition Home   What phone number can be called within the next 1-3 days to see how you are doing after discharge? ([phone])   Hospital Follow Up  Appt(s) scheduled? Yes   Discharge plans and expectations educations in teach back method with documentation complete? Yes   Right Care Referral Info   Post Acute Recommendation No Care     "

## 2018-06-12 NOTE — PROGRESS NOTES
This note also relates to the following rows which could not be included:  Rate - Cannot attach notes to extension rows    There was no blood infusing at the time of my assessment.

## 2018-06-12 NOTE — CONSULTS
[facility]  Hematology/Oncology  Consult Note    Patient Name: Ade Castellano  MRN: [id number]  Admission Date: 6/11/2018  Hospital Length of Stay: 1 days  Code Status: Full Code   Attending Provider: Eli Aquino MD  Consulting Provider: Mildred Holliday MD  Primary Care Physician: Jeannine Huitron MD  Principal Problem:Severe anemia    Inpatient consult to Hematology/Oncology  Consult performed by: MILDRED HOLLIDAY  Consult ordered by: SELENE CANNON        Subjective:   REASON FOR CONSULTATION: Anemia and neutropenia in chemo pt  HPI:  Patient well known to me with history of breast cancer, cervical cancer, COPD, hyperlipidemia, hypertension and metastatic non-small cell lung cancer admitted with worsening fatigue and shortness of breath x2 weeks. Patient also with complaint of diffuse abdominal pain associated with constipation.  No melena, hematochezia. No fevers or chest pain. No headaches vision changes.  CBC on presentation white blood cell count 0.91 hemoglobin 4.9 grams/deciliter hematocrit 14.3% platelet count 192 ANC of 500.  She has received 3 units of packed red blood cell transfusion with appropriate response with hemoglobin of 12.3g/dl. Patient is ready to go home. Consulted for anemia and neutropenia.     Oncology Treatment Plan:   OP NSCLC abraxane (Weekly) + CARBOPLATIN (AUC)    Medications:  Continuous Infusions:  Scheduled Meds:   albuterol sulfate  2.5 mg Nebulization Q4H    ascorbic acid (vitamin C)  500 mg Oral Daily    aspirin  325 mg Oral Daily    enoxaparin  40 mg Subcutaneous Daily    isosorbide mononitrate  30 mg Oral Daily    metoprolol tartrate  25 mg Oral BID    polyethylene glycol  17 g Oral Daily    rosuvastatin  10 mg Oral Daily    tiotropium  1 capsule Inhalation Daily     PRN Meds:sodium chloride, acetaminophen, hydrALAZINE, HYDROcodone-acetaminophen, morphine, nitroGLYCERIN, ondansetron, prochlorperazine     Review of patient's  allergies indicates:  No Known Allergies     Past Medical History:   Diagnosis Date    Acute respiratory failure with hypoxia and hypercapnia 11/29/2017    Angina pectoris     Arthritis     Bell's palsy     left facial weakness    Breast cancer     RIGHT    CAD (coronary artery disease)     Cervical cancer     Chronic bronchitis     COPD (chronic obstructive pulmonary disease)     Dr. Sterling Krueger bridge present     Emphysema of lung     H/O colonoscopy 06/2017    due for repeat colonsocopy in 6/2018    History of heart artery stent     Dr. Ortiz  x2 stents    Hyperlipidemia     Hypertension     Lung cancer     Myocardial infarction     SUNITHA (obstructive sleep apnea)     intolerant to mask    Pneumonia     Pneumonia due to other staphylococcus     PUD (peptic ulcer disease)     Severe anemia 6/11/2018    Sleep apnea     Vaginal delivery     x1     Past Surgical History:   Procedure Laterality Date    ADENOIDECTOMY      BREAST BIOPSY Right     x3    BREAST SURGERY      lumpectomy right side     CERVIX SURGERY      cone    COLONOSCOPY N/A 3/17/2017    Procedure: COLONOSCOPY;  Surgeon: Julio Rudd MD;  Location: [facility] ENDO;  Service: Endoscopy;  Laterality: N/A;    COLONOSCOPY N/A 6/30/2017    Procedure: COLONOSCOPY;  Surgeon: Julio Rudd MD;  Location: [facility] ENDO;  Service: Endoscopy;  Laterality: N/A;    EYE SURGERY Left 06/08/2018    PORTACATH PLACEMENT Right 01/2018    sweat glands axillary regions      TONSILLECTOMY       Family History     Problem Relation (Age of Onset)    Breast cancer Mother    Cancer Mother, Father, Sister, Maternal Grandmother, Maternal Grandfather, Paternal Grandmother, Paternal Grandfather, Sister    Heart attack Sister    Heart disease Mother, Maternal Grandmother, Maternal Grandfather        Social History Main Topics    Smoking status: Former Smoker     Packs/day: 0.25     Years: 50.00     Types: Cigarettes     Quit date: 8/23/2009    Smokeless  tobacco: Never Used    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 Negative for appetite change, fever and unexpected weight change.   HENT: Negative for mouth sores.    Eyes: Negative for visual disturbance.   Respiratory: Positive for shortness of breath. Negative for cough.    Cardiovascular: Negative for chest pain.   Gastrointestinal: Positive for abdominal pain. Negative for diarrhea.   Genitourinary: Negative for frequency.   Musculoskeletal: Negative for back pain.   Skin: Negative for rash.   Neurological: Negative for headaches.   Hematological: Negative for adenopathy.   Psychiatric/Behavioral: The patient is not nervous/anxious.      Objective:     Vital Signs (Most Recent):  Temp: 98 °F (36.7 °C) (06/12/18 1123)  Pulse: 62 (06/12/18 1151)  Resp: 18 (06/12/18 1151)  BP: 127/73 (06/12/18 1123)  SpO2: 97 % (06/12/18 1151) Vital Signs (24h Range):  Temp:  [97.7 °F (36.5 °C)-98.2 °F (36.8 °C)] 98 °F (36.7 °C)  Pulse:  [58-89] 62  Resp:  [16-20] 18  SpO2:  [92 %-100 %] 97 %  BP: (112-173)/(64-95) 127/73     Weight: 77.1 kg (169 lb 15.6 oz)  Body mass index is 30.11 kg/m².  Body surface area is 1.85 meters squared.      Intake/Output Summary (Last 24 hours) at 06/12/18 1235  Last data filed at 06/12/18 0630   Gross per 24 hour   Intake             1761 ml   Output                0 ml   Net             1761 ml       Physical Exam   Constitutional: She is oriented to person, place, and time. She appears well-developed and well-nourished.   HENT:   Head: Normocephalic.   Mouth/Throat: Oropharynx is clear and moist. No oropharyngeal exudate.   Eyes: Conjunctivae and lids are normal. Pupils are equal, round, and reactive to light. No scleral icterus.   Neck: Normal range of motion. Neck supple. No thyromegaly present.   Cardiovascular: Normal rate, regular rhythm and normal heart sounds.    No murmur  heard.  Pulmonary/Chest: Breath sounds normal. She has no wheezes. She has no rales.   Abdominal: Soft. Bowel sounds are normal. She exhibits no distension and no mass. There is no hepatosplenomegaly. There is no tenderness. There is no rebound and no guarding.   Musculoskeletal: Normal range of motion. She exhibits no edema or tenderness.   Lymphadenopathy:     She has no cervical adenopathy.     She has no axillary adenopathy.        Right: No supraclavicular adenopathy present.        Left: No supraclavicular adenopathy present.   Neurological: She is alert and oriented to person, place, and time. No cranial nerve deficit. Coordination normal.   Skin: Skin is warm and dry. No ecchymosis, no petechiae and no rash noted. No erythema.   Psychiatric: She has a normal mood and affect.       Significant Labs:   All pertinent labs within the past 24 hours have been reviewed    Diagnostic Results:  I have reviewed and interpreted all pertinent imaging results/findings within the past 24 hours    Assessment/Plan:     Antineoplastic chemotherapy induced anemia    Status post 3 units of packed red blood cell transfusion with appropriate response    Hemoglobin 12.3 grams/deciliter            Chemotherapy induced neutropenia    Resolved  Afebrile    Follow-up as an outpatient        Squamous cell carcinoma of right lung    Patient with metastatic non-small cell lung cancer undergoing chemotherapy with carboplatin Abraxane    She completed cycle 5 of carboplatin/Abraxane on 06/18/2018      She will follow up as an outpatient as scheduled this week             OK to d/c home  Close follow-up this week as scheduled    Thank you for your consult.   Mildred Holliday MD  Hematology/Oncology  [facility]

## 2018-06-12 NOTE — HPI
Patient well known to me with history of breast cancer, cervical cancer, COPD, hyperlipidemia, hypertension and metastatic non-small cell lung cancer admitted with worsening fatigue and shortness of breath x2 weeks. Patient also with complaint of diffuse abdominal pain associated with constipation.  No melena, hematochezia. No fevers or chest pain. No headaches vision changes.  CBC on presentation white blood cell count 0.91 hemoglobin 4.9 grams/deciliter hematocrit 14.3% platelet count 192 ANC of 500.  She has received 3 units of packed red blood cell transfusion with appropriate response with hemoglobin of 12.3g/dl. Patient is ready to go home. Consulted for anemia and neutropenia.

## 2018-06-12 NOTE — PLAN OF CARE
"TN met with patient at bedside to complete discharge needs assessment. TN explained duties of case management to patient.  TN reviewed  "[facility] Packet", "Discharge Planning Begins on Admission" brochure and discussed "Help at Home". Patient stated that she lives alone and is independent. Patient receives help from her niece Sherri when she is available. TN also discussed her responsibilities to manage her health at home. Patient was informed to leave folder at bedside during hospital stay. Contact information added to white board.    PCP- Jeannine Huitron MD     [place]   [facility] [place] [address]  [address]  [place] [address]  Phone: [phone] Fax: [phone]      Appointment Time Preference: mornings       06/12/18 1058   Discharge Assessment   Assessment Type Discharge Planning Assessment   Confirmed/corrected address and phone number on facesheet? Yes   Assessment information obtained from? Patient   Communicated expected length of stay with patient/caregiver yes   Prior to hospitilization cognitive status: Alert/Oriented   Prior to hospitalization functional status: Independent   Current Functional Status: Independent   Lives With alone   Able to Return to Prior Arrangements yes   Is patient able to care for self after discharge? Yes   Who are your caregiver(s) and their phone number(s)? [email or web address] [phone]   Patient's perception of discharge disposition home or selfcare   Readmission Within The Last 30 Days no previous admission in last 30 days   Patient currently being followed by outpatient case management? No   Patient currently receives any other outside agency services? No   Equipment Currently Used at Home oxygen  (2.5L)   Do you have any problems affording any of your prescribed medications? No   Is the patient taking medications as prescribed? yes   Does the patient have transportation home? Yes   Transportation Available car "   Does the patient receive services at [facility]? No   Discharge Plan A Home   Discharge Plan B Home   Patient/Family In Agreement With Plan yes   Does the patient have transportation to healthcare appointments? Yes

## 2018-06-12 NOTE — ASSESSMENT & PLAN NOTE
Status post 3 units of packed red blood cell transfusion with appropriate response    Hemoglobin 12.3 grams/deciliter    Continue to monitor

## 2018-06-12 NOTE — SUBJECTIVE & OBJECTIVE
Oncology Treatment Plan:   OP NSCLC abraxane (Weekly) + CARBOPLATIN (AUC)    Medications:  Continuous Infusions:  Scheduled Meds:   albuterol sulfate  2.5 mg Nebulization Q4H    ascorbic acid (vitamin C)  500 mg Oral Daily    aspirin  325 mg Oral Daily    enoxaparin  40 mg Subcutaneous Daily    isosorbide mononitrate  30 mg Oral Daily    metoprolol tartrate  25 mg Oral BID    polyethylene glycol  17 g Oral Daily    rosuvastatin  10 mg Oral Daily    tiotropium  1 capsule Inhalation Daily     PRN Meds:sodium chloride, acetaminophen, hydrALAZINE, HYDROcodone-acetaminophen, morphine, nitroGLYCERIN, ondansetron, prochlorperazine     Review of patient's allergies indicates:  No Known Allergies     Past Medical History:   Diagnosis Date    Acute respiratory failure with hypoxia and hypercapnia 11/29/2017    Angina pectoris     Arthritis     Bell's palsy     left facial weakness    Breast cancer     RIGHT    CAD (coronary artery disease)     Cervical cancer     Chronic bronchitis     COPD (chronic obstructive pulmonary disease)     Dr. Katz    Dental bridge present     Emphysema of lung     H/O colonoscopy 06/2017    due for repeat colonsocopy in 6/2018    History of heart artery stent     Dr. Ortiz  x2 stents    Hyperlipidemia     Hypertension     Lung cancer     Myocardial infarction     SUNITHA (obstructive sleep apnea)     intolerant to mask    Pneumonia     Pneumonia due to other staphylococcus     PUD (peptic ulcer disease)     Severe anemia 6/11/2018    Sleep apnea     Vaginal delivery     x1     Past Surgical History:   Procedure Laterality Date    ADENOIDECTOMY      BREAST BIOPSY Right     x3    BREAST SURGERY      lumpectomy right side     CERVIX SURGERY      cone    COLONOSCOPY N/A 3/17/2017    Procedure: COLONOSCOPY;  Surgeon: Julio Rudd MD;  Location: [facility];  Service: Endoscopy;  Laterality: N/A;    COLONOSCOPY N/A 6/30/2017    Procedure: COLONOSCOPY;  Surgeon: Julio  MD Blaire;  Location: [facility];  Service: Endoscopy;  Laterality: N/A;    EYE SURGERY Left 06/08/2018    PORTACATH PLACEMENT Right 01/2018    sweat glands axillary regions      TONSILLECTOMY       Family History     Problem Relation (Age of Onset)    Breast cancer Mother    Cancer Mother, Father, Sister, Maternal Grandmother, Maternal Grandfather, Paternal Grandmother, Paternal Grandfather, Sister    Heart attack Sister    Heart disease Mother, Maternal Grandmother, Maternal Grandfather        Social History Main Topics    Smoking status: Former Smoker     Packs/day: 0.25     Years: 50.00     Types: Cigarettes     Quit date: 8/23/2009    Smokeless tobacco: Never Used    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 Negative for appetite change, fever and unexpected weight change.   HENT: Negative for mouth sores.    Eyes: Negative for visual disturbance.   Respiratory: Positive for shortness of breath. Negative for cough.    Cardiovascular: Negative for chest pain.   Gastrointestinal: Positive for abdominal pain. Negative for diarrhea.   Genitourinary: Negative for frequency.   Musculoskeletal: Negative for back pain.   Skin: Negative for rash.   Neurological: Negative for headaches.   Hematological: Negative for adenopathy.   Psychiatric/Behavioral: The patient is not nervous/anxious.      Objective:     Vital Signs (Most Recent):  Temp: 98 °F (36.7 °C) (06/12/18 1123)  Pulse: 62 (06/12/18 1151)  Resp: 18 (06/12/18 1151)  BP: 127/73 (06/12/18 1123)  SpO2: 97 % (06/12/18 1151) Vital Signs (24h Range):  Temp:  [97.7 °F (36.5 °C)-98.2 °F (36.8 °C)] 98 °F (36.7 °C)  Pulse:  [58-89] 62  Resp:  [16-20] 18  SpO2:  [92 %-100 %] 97 %  BP: (112-173)/(64-95) 127/73     Weight: 77.1 kg (169 lb 15.6 oz)  Body mass index is 30.11 kg/m².  Body surface area is 1.85 meters squared.      Intake/Output Summary (Last 24  hours) at 06/12/18 1235  Last data filed at 06/12/18 0630   Gross per 24 hour   Intake             1761 ml   Output                0 ml   Net             1761 ml       Physical Exam   Constitutional: She is oriented to person, place, and time. She appears well-developed and well-nourished.   HENT:   Head: Normocephalic.   Mouth/Throat: Oropharynx is clear and moist. No oropharyngeal exudate.   Eyes: Conjunctivae and lids are normal. Pupils are equal, round, and reactive to light. No scleral icterus.   Neck: Normal range of motion. Neck supple. No thyromegaly present.   Cardiovascular: Normal rate, regular rhythm and normal heart sounds.    No murmur heard.  Pulmonary/Chest: Breath sounds normal. She has no wheezes. She has no rales.   Abdominal: Soft. Bowel sounds are normal. She exhibits no distension and no mass. There is no hepatosplenomegaly. There is no tenderness. There is no rebound and no guarding.   Musculoskeletal: Normal range of motion. She exhibits no edema or tenderness.   Lymphadenopathy:     She has no cervical adenopathy.     She has no axillary adenopathy.        Right: No supraclavicular adenopathy present.        Left: No supraclavicular adenopathy present.   Neurological: She is alert and oriented to person, place, and time. No cranial nerve deficit. Coordination normal.   Skin: Skin is warm and dry. No ecchymosis, no petechiae and no rash noted. No erythema.   Psychiatric: She has a normal mood and affect.       Significant Labs:   All pertinent labs within the past 24 hours have been reviewed    Diagnostic Results:  I have reviewed and interpreted all pertinent imaging results/findings within the past 24 hours

## 2018-06-12 NOTE — H&P
"[facility] Hospital Medicine  History & Physical    Patient Name: Ade Castellano  MRN: [id number]  Admission Date: 06/12/2018  Attending Physician: Kameron Babb MD, MPH      PCP:   Jeannine Huitron MD    CC:     Chief Complaint   Patient presents with    Abdominal Pain     bilateral abd side pain that wraps around to front and back upon ambulation. "it happens when I walk about 5 feet, take my breath away."  Chemo pt.        HISTORY OF PRESENT ILLNESS:     Ade Castellano is a 72 y.o. female that (in part)  has a past medical history of Acute respiratory failure with hypoxia and hypercapnia; Angina pectoris; Arthritis; Bell's palsy; Breast cancer; CAD (coronary artery disease); Cervical cancer; Chronic bronchitis; COPD (chronic obstructive pulmonary disease); Dental bridge present; Emphysema of lung; H/O colonoscopy; History of heart artery stent; Hyperlipidemia; Hypertension; Lung cancer; Myocardial infarction; SUNITHA (obstructive sleep apnea); Pneumonia; Pneumonia due to other staphylococcus; PUD (peptic ulcer disease); Severe anemia; Sleep apnea; and Vaginal delivery.  has a past surgical history that includes Cervix surgery; Breast surgery; Tonsillectomy; Adenoidectomy; sweat glands axillary regions; Colonoscopy (N/A, 3/17/2017); Breast biopsy (Right); Colonoscopy (N/A, 6/30/2017); Portacath placement (Right, 01/2018); and Eye surgery (Left, 06/08/2018). Presents to [facility] Emergency Department complaining of bilateral abdominal pain.  Associated with constipation, decreased urine output that is dark, and nausea.  Patient is currently undergoing chemotherapy for squamous cell carcinoma of the lung.  She had a bowel movement yesterday and states her stool is hard.  Denies hematemesis, melena, hematochezia.  Denies hemoptysis or epistaxis.  No other obvious source of bleeding.  Pain worsened with ambulation and palpation.  Moderate to severe intensity.  No " relief with bowel movement.  Daily frequency.  Constant duration.    In the emergency department routine laboratory studies were obtained which revealed evidence of severe anemia with hemoglobin of 4.9 as well as neutropenia with white blood cell count of 910.  She was ordered to be transfused 3 units of packed red blood cells.      Hospital medicine has been asked to admit for further evaluation and treatment.       REVIEW OF SYSTEMS:     -- Constitutional: No fever or chills.  -- Eyes: No visual changes, diplopia, pain, tearing, blind spots, or discharge.   -- Ears, nose, mouth, throat, and face: No congestion, sore throat, epistaxis, d/c, bleeding gums, neck stiffness masses, or dental issues.  -- Respiratory: + History of squamous cell lung cancer currently undergoing chemotherapy.  + Shortness of breath  + dyspnea with exertion No cough, hemoptysis, stridor, wheezing, or night sweats.  -- Cardiovascular: + Dyspnea with exertion.  No chest pain, syncope, PND, edema, cyanosis, or palpitations.   -- Gastrointestinal: As above in history of present illness  -- Genitourinary: + Dark urine  + decreased urine output.  No hematuria  -- Integument/breast: No rash, pruritis, pigmentation changes, dryness, or changes in hair  -- Hematologic/lymphatic: No easy bruising or lymphadenopathy.   -- Musculoskeletal: Chronic diffuse arthralgias.  No acute arthralgias, acute myalgias, joint swelling, acute limitations of ROM, or acute muscular weakness.  -- Neurological: No seizures, headaches, incoordination, paraesthesias, ataxia, vertigo, or tremors.  -- Behavioral/Psych: No auditory or visual hallucinations, depression, or suicidal/homicidal ideations.  -- Endocrine: No heat or cold intolerance, polydipsia, or unintentional weight gain / loss.  -- Allergy/Immunologic: No recurrent infections or adverse reaction to food, insects, or difficulty breathing.    Pain Scale  5 on scale of 1 to 10    PAST MEDICAL / SURGICAL HISTORY:      Past Medical History:   Diagnosis Date    Acute respiratory failure with hypoxia and hypercapnia 11/29/2017    Angina pectoris     Arthritis     Bell's palsy     left facial weakness    Breast cancer     RIGHT    CAD (coronary artery disease)     Cervical cancer     Chronic bronchitis     COPD (chronic obstructive pulmonary disease)     Dr. Katz    Dental bridge present     Emphysema of lung     H/O colonoscopy 06/2017    due for repeat colonsocopy in 6/2018    History of heart artery stent     Dr. Ortiz  x2 stents    Hyperlipidemia     Hypertension     Lung cancer     Myocardial infarction     SUNITHA (obstructive sleep apnea)     intolerant to mask    Pneumonia     Pneumonia due to other staphylococcus     PUD (peptic ulcer disease)     Severe anemia 6/11/2018    Sleep apnea     Vaginal delivery     x1     Past Surgical History:   Procedure Laterality Date    ADENOIDECTOMY      BREAST BIOPSY Right     x3    BREAST SURGERY      lumpectomy right side     CERVIX SURGERY      cone    COLONOSCOPY N/A 3/17/2017    Procedure: COLONOSCOPY;  Surgeon: Julio Rudd MD;  Location: [facility] ENDO;  Service: Endoscopy;  Laterality: N/A;    COLONOSCOPY N/A 6/30/2017    Procedure: COLONOSCOPY;  Surgeon: Julio Rudd MD;  Location: [facility] ENDO;  Service: Endoscopy;  Laterality: N/A;    EYE SURGERY Left 06/08/2018    PORTACATH PLACEMENT Right 01/2018    sweat glands axillary regions      TONSILLECTOMY           FAMILY HISTORY:     Family History   Problem Relation Age of Onset    Cancer Mother         breast    Heart disease Mother     Breast cancer Mother     Cancer Father         lung-smoker     Cancer Sister         lung-smoker     Heart attack Sister     Cancer Maternal Grandmother     Heart disease Maternal Grandmother     Cancer Maternal Grandfather     Heart disease Maternal Grandfather     Cancer Paternal Grandmother     Cancer Paternal Grandfather     Cancer Sister         mets not  sure where it started          SOCIAL HISTORY:     Social History     Social History    Marital status: Single     Spouse name: N/A    Number of children: N/A    Years of education: N/A     Social History Main Topics    Smoking status: Former Smoker     Packs/day: 0.25     Years: 50.00     Types: Cigarettes     Quit date: 8/23/2009    Smokeless tobacco: Never Used    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  Prevnar 13 pneumonia vaccine =  evidence of previous vaccination found in the medical record      HOME MEDICATIONS:     Prior to Admission medications    Medication Sig Start Date End Date Taking? Authorizing Provider   albuterol (PROVENTIL) 2.5 mg /3 mL (0.083 %) nebulizer solution NEBULIZE 1 vial EVERY 6 HOURS AS NEEDED FOR WHEEZING 2/8/17   OBDULIO Javier   albuterol (VENTOLIN HFA) 90 mcg/actuation inhaler INHALE 2 PUFF(S) BY MOUTH EVERY 4 - 6 HOURS AS NEEDED FOR SHORTNESS OF BREATH or WHEEZING 8/26/14   Jeannine Huitron MD   ascorbic acid (VITAMIN C) 500 MG tablet Take 500 mg by mouth once daily.    Historical Provider, MD   aspirin (ECOTRIN) 325 MG EC tablet Take 325 mg by mouth once daily.    Historical Provider, MD   ciprofloxacin HCl (CILOXAN) 0.3 % ophthalmic solution 1 drop in surgical eye four times a day; START 2 DAYS BEFORE SURGERY 5/24/18   Historical Provider, MD   dexamethasone (DECADRON) 4 MG Tab Take 5 tablets (20 mg total) by mouth As instructed. Take the night before chemo & the morning of chemo. 4/23/18 4/23/19  Elva Perez MD   DUREZOL 0.05 % Drop ophthalmic solution 2 drops in surgical eye twice a day; START AFTER SURGERY; FOR 30 DAYS 5/24/18   Historical Provider, MD   epinastine 0.05 %  ophthalmic solution PLACE 1 DROP(S) IN BOTH EYES TWICE DAILY 7/20/17   Historical Provider, MD   fluticasone (FLONASE) 50 mcg/actuation nasal spray USE TWO SPRAYS IN EACH NOSTRIL ONCE A DAY 7/31/17   Historical Provider, MD   hydrocodone-acetaminophen 5-325mg (NORCO) 5-325 mg per tablet Take 1 tablet by mouth every 6 (six) hours as needed for Pain. 3/19/18   Mildred Holliday MD   isosorbide mononitrate (IMDUR) 60 MG 24 hr tablet Take 30 mg by mouth once daily.  5/26/15   Historical Provider, MD   metoprolol tartrate (LOPRESSOR) 25 MG tablet Take 25 mg by mouth 2 (two) times daily.  12/7/12   Jr Lopez MD   NITROSTAT 0.4 mg SL tablet place 1 tablet under the tongue AS NEEDED no more than 3 in 15 minutes 8/29/17   OBDULIO Javier   prochlorperazine (COMPAZINE) 10 MG tablet Take 1 tablet (10 mg total) by mouth every 6 (six) hours as needed. 3/2/18 3/2/19  Mildred Holliday MD   rosuvastatin (CRESTOR) 20 MG tablet Take 1 tablet (20 mg total) by mouth once daily.  Patient taking differently: Take 10 mg by mouth once daily.  10/14/14   Jeannine Huitron MD   SPIRIVA RESPIMAT 2.5 mcg/actuation Mist Inhale 1 puff into the lungs once daily. 9/20/16   Historical Provider, MD          HOSPITAL MEDICATIONS:     Scheduled Meds:    albuterol sulfate  2.5 mg Nebulization Q4H    ascorbic acid (vitamin C)  500 mg Oral Daily    aspirin  325 mg Oral Daily    enoxaparin  40 mg Subcutaneous Daily    isosorbide mononitrate  30 mg Oral Daily    magnesium sulfate IVPB  2 g Intravenous Once    metoprolol tartrate  25 mg Oral BID    polyethylene glycol  17 g Oral Daily    rosuvastatin  10 mg Oral Daily    tiotropium  1 capsule Inhalation Daily     Continuous Infusions:   PRN Meds: sodium chloride, HYDROcodone-acetaminophen, morphine, nitroGLYCERIN, ondansetron, prochlorperazine      PHYSICAL EXAM:     Wt Readings from Last 1 Encounters:   06/11/18 2355 77.1 kg (169 lb 15.6 oz)   06/11/18 1700 77.9 kg (171 lb 11.8 oz)    06/11/18 1539 77.1 kg (170 lb)   06/11/18 1144 77.1 kg (170 lb)     Body mass index is 30.11 kg/m².  Vitals:    06/12/18 0102 06/12/18 0123 06/12/18 0159 06/12/18 0258   BP: (!) 158/74  (!) 161/84 (!) 171/94   BP Location: Left arm  Left arm    Patient Position: Lying  Sitting Sitting   Pulse: 70 67 84 73   Resp: 18 18 18 17   Temp: 98.2 °F (36.8 °C)  98 °F (36.7 °C) 97.9 °F (36.6 °C)   TempSrc: Oral  Oral Oral   SpO2: 95% 97% (!) 92% (!) 93%   Weight:       Height:              -- General appearance: Chronically ill-appearing female who is lying in bed and appears comfortable.  No apparent distress.  well developed. appears stated age   -- Head: normocephalic, atraumatic   -- Eyes:  Pale conjunctivae. Extraocular muscles intact  -- Nose: Nares normal. Septum midline.   -- Mouth/Throat: lips, mucosa, and tongue normal. no throat erythema.   -- Neck: supple, symmetrical, trachea midline, no JVD and thyroid not grossly enlarged, appears symmetric  -- Lungs: clear to auscultation bilaterally. normal respiratory effort. No use of accessory muscles.   -- Chest wall: no tenderness. equal bilateral chest rise   -- Heart: regular rate and regular rhythm. S1, S2 normal.  no click, rub or gallop   -- Abdomen: Diffuse mild abdominal tenderness is worsened with palpation.  Mildly distended.  No rebound or guarding. non-tympanic; bowel sounds normal; no masses  -- Extremities: no cyanosis, clubbing or edema.   -- Pulses: 2+ and symmetric   -- Skin: Skin pallor. texture normal, turgor normal. No rashes or lesions.   -- Neurologic: Globally decreased muscle strength and tone. No focal numbness or weakness. CNII-XII intact. [place] coma scale: eyes open spontaneously-4, oriented & converses-5, obeys commands-6.      LABORATORY STUDIES:     Recent Results (from the past 36 hour(s))   Urinalysis    Collection Time: 06/11/18  8:33 AM   Result Value Ref Range    Specimen UA Urine, Clean Catch     Color, UA Yellow Yellow, Straw, Fadia     Appearance, UA Clear Clear    pH, UA 6.0 5.0 - 8.0    Specific Gravity, UA 1.015 1.005 - 1.030    Protein, UA Negative Negative    Glucose, UA Negative Negative    Ketones, UA Negative Negative    Bilirubin (UA) Negative Negative    Occult Blood UA Negative Negative    Nitrite, UA Negative Negative    Urobilinogen, UA 2.0-3.0 (A) <2.0 EU/dL    Leukocytes, UA Trace (A) Negative   Urinalysis Microscopic    Collection Time: 06/11/18  8:33 AM   Result Value Ref Range    WBC, UA 5 0 - 5 /hpf    Squam Epithel, UA Moderate /hpf    Microscopic Comment SEE COMMENT    Comprehensive metabolic panel    Collection Time: 06/11/18  1:45 PM   Result Value Ref Range    Sodium 138 136 - 145 mmol/L    Potassium 4.0 3.5 - 5.1 mmol/L    Chloride 109 95 - 110 mmol/L    CO2 21 (L) 23 - 29 mmol/L    Glucose 141 (H) 70 - 110 mg/dL    BUN, Bld 20 8 - 23 mg/dL    Creatinine 0.9 0.5 - 1.4 mg/dL    Calcium 8.0 (L) 8.7 - 10.5 mg/dL    Total Protein 5.7 (L) 6.0 - 8.4 g/dL    Albumin 3.5 3.5 - 5.2 g/dL    Total Bilirubin 1.0 0.1 - 1.0 mg/dL    Alkaline Phosphatase 34 (L) 55 - 135 U/L    AST 20 10 - 40 U/L    ALT 14 10 - 44 U/L    Anion Gap 8 8 - 16 mmol/L    eGFR if African American >60 >60 mL/min/1.73 m^2    eGFR if non African American >60 >60 mL/min/1.73 m^2   CBC auto differential    Collection Time: 06/11/18  1:45 PM   Result Value Ref Range    WBC 0.91 (LL) 3.90 - 12.70 K/uL    RBC 1.54 (L) 4.00 - 5.40 M/uL    Hemoglobin 4.9 (LL) 12.0 - 16.0 g/dL    Hematocrit 14.3 (LL) 37.0 - 48.5 %    MCV 93 82 - 98 fL    MCH 31.8 (H) 27.0 - 31.0 pg    MCHC 34.3 32.0 - 36.0 g/dL    RDW 19.0 (H) 11.5 - 14.5 %    Platelets 192 150 - 350 K/uL    MPV 10.1 9.2 - 12.9 fL    Lymph # CANCELED 1.0 - 4.8 K/uL    Mono # CANCELED 0.3 - 1.0 K/uL    Eos # CANCELED 0.0 - 0.5 K/uL    Baso # CANCELED 0.00 - 0.20 K/uL    Gran% 56.0 38.0 - 73.0 %    Lymph% 40.0 18.0 - 48.0 %    Mono% 4.0 4.0 - 15.0 %    Eosinophil% 0.0 0.0 - 8.0 %    Basophil% 0.0 0.0 - 1.9 %     Differential Method Manual    Lipase    Collection Time: 06/11/18  1:45 PM   Result Value Ref Range    Lipase 26 4 - 60 U/L   Magnesium    Collection Time: 06/11/18  1:45 PM   Result Value Ref Range    Magnesium 1.5 (L) 1.6 - 2.6 mg/dL   Lactic acid, plasma    Collection Time: 06/11/18  1:45 PM   Result Value Ref Range    Lactate (Lactic Acid) 1.9 0.5 - 2.2 mmol/L   Troponin I    Collection Time: 06/11/18  1:45 PM   Result Value Ref Range    Troponin I <0.006 0.000 - 0.026 ng/mL   Brain natriuretic peptide    Collection Time: 06/11/18  1:45 PM   Result Value Ref Range    BNP 86 0 - 99 pg/mL   Urinalysis, Reflex to Urine Culture Urine, Clean Catch    Collection Time: 06/11/18  1:50 PM   Result Value Ref Range    Specimen UA Urine, Clean Catch     Color, UA Yellow Yellow, Straw, Fadia    Appearance, UA Clear Clear    pH, UA 6.0 5.0 - 8.0    Specific Gravity, UA 1.010 1.005 - 1.030    Protein, UA Negative Negative    Glucose, UA Negative Negative    Ketones, UA Negative Negative    Bilirubin (UA) Negative Negative    Occult Blood UA Negative Negative    Nitrite, UA Negative Negative    Urobilinogen, UA 2.0-3.0 (A) <2.0 EU/dL    Leukocytes, UA Negative Negative   Type & Screen    Collection Time: 06/11/18  1:51 PM   Result Value Ref Range    Group & Rh A POS     Indirect Queta NEG    Prepare RBC 3 Units; Severe anemia    Collection Time: 06/11/18  1:51 PM   Result Value Ref Range    UNIT NUMBER [id number]     PRODUCT CODE I0113E60     DISPENSE STATUS TRANSFUSED     CODING SYSTEM MPEC608     Unit Blood Type Code 6200     Unit Blood Type A POS     Unit Expiration [id number]     UNIT NUMBER [id number]     PRODUCT CODE T1309S85     DISPENSE STATUS TRANSFUSED     CODING SYSTEM WSUI021     Unit Blood Type Code 6200     Unit Blood Type A POS     Unit Expiration [id number]     UNIT NUMBER [id number]     PRODUCT CODE E8207H95     DISPENSE STATUS ISSUED     CODING SYSTEM IQCB291     Unit Blood Type Code 6200      Unit Blood Type A POS     Unit Expiration [id number]        Lab Results   Component Value Date    INR 1.0 02/09/2018    INR 1.0 11/29/2017     Lab Results   Component Value Date    HGBA1C 5.8 (H) 03/16/2018     No results for input(s): POCTGLUCOSE in the last 72 hours.          IMAGING:     Imaging Results          US Abdomen Limited (Final result)  Result time 06/11/18 15:07:35    Final result by Haris Flores MD (06/11/18 15:07:35)                 Impression:      Gallbladder sludge without sonographic evidence of acute cholecystitis.      Electronically signed by: Haris Flores MD  Date:    06/11/2018  Time:    15:07             Narrative:    EXAMINATION:  US ABDOMEN LIMITED    CLINICAL HISTORY:  Right upper quadrant abdominal pain;    TECHNIQUE:  Limited ultrasound of the right upper quadrant of the abdomen (including pancreas, liver, gallbladder, common bile duct, and right kidney) was performed.    COMPARISON:  PET-CT 04/19/2018 and CT abdomen and pelvis 11/29/2017    FINDINGS:  Liver: Normal in size, measuring 14.5 cm. Homogeneous echotexture. No focal hepatic lesions.    Gallbladder: [place] gallbladder sludge.  No calculi, wall thickening, or pericholecystic fluid.  No sonographic Hernandez's sign.    Biliary system: The common duct is not dilated, measuring 4 mm.  No intrahepatic ductal dilatation.    Right kidney: Normal in size with no hydronephrosis, measuring 12.7 cm.    Miscellaneous: No upper abdominal ascites.  Visualized portions of the pancreas and IVC are within normal limits.                               X-Ray Chest AP Portable (Final result)  Result time 06/11/18 13:57:13    Final result by Tito Dominguez MD (06/11/18 13:57:13)                 Impression:      Abnormal right lung opacity correlating with known tumor.  No acute change.      Electronically signed by: Tito Dominguez MD  Date:    06/11/2018  Time:    13:57             Narrative:    EXAMINATION:  XR CHEST AP  PORTABLE    CLINICAL HISTORY:  chest pain;    TECHNIQUE:  Single frontal view of the chest was performed.    COMPARISON:  05/28/2018    FINDINGS:  Port catheter remains on the right with tip at the junction of the innominate veins.  Heart size is normal with a coronary stent or atherosclerosis evident.  Mediastinum shows aortic atherosclerosis.  Lungs are expanded.  Left lung is clear.  The right lung again shows several cm region of ill-defined opacification similar to most recent exam and correlating with a known lung cancer.  Elsewhere right lung is clear.  No pleural fluid.  Skeletal structures show no acute finding.                                  CONSULTS:     IP CONSULT TO HEM/ONC       ASSESSMENT & PLAN:     Primary Diagnosis:  Severe anemia    Active Hospital Problems    Diagnosis  POA    *Severe anemia [D64.9]  Yes     Priority: 1 - High    Chemotherapy induced neutropenia [D70.1, T45.1X5A]  Yes     Priority: 2     Squamous cell carcinoma of right lung [C34.91]  Yes     Priority: 3     Hypomagnesemia [E83.42]  Yes     Priority: 4     COPD (chronic obstructive pulmonary disease) [J44.9]  Yes     Chronic     Dr. Katz      Hyperlipidemia LDL goal <70 [E78.5]  Yes     Chronic     HRA 2014      Stable angina [I20.8]  Yes     Chronic     HRA 2014 nitro SL prn  On Imdur daily.  [facility]       SUNITHA (obstructive sleep apnea) [G47.33]  Yes     Chronic     intolerant to mask      History of breast cancer [Z85.3]  Not Applicable    Benign hypertensive heart disease without heart failure [I11.9]  Yes     Chronic     Dr. Ortiz is Cardiologist       CAD (coronary artery disease) [I25.10]  Yes     Chronic     Dr. Ortiz        Resolved Hospital Problems    Diagnosis Date Resolved POA   No resolved problems to display.         Chemotherapy-induced anemia and leukopenia    · The patient's H/H is severely depressed severely depressed compared to baseline.  She will require transfusion of 3  units packed red blood cells for symptomatic anemia.  Also with history of coronary artery disease.  She may require additional units beyond this.  · The patient exhibits no signs or symptoms of acute bleeding.  · Will continue to monitor her closely.  · Absolute additional count 510; we will trend WBCs  · Neutropenia precautions  · Prophylactic antimicrobials?  Will defer to hematology/oncology.  No obvious signs of infection at this time    Hypomagnesemia  · Due to GI losses or poor oral intake  · Replace magnesium IV  · Check serial magnesium    Abdominal pain  · Likely secondary to constipation.  Initiate adequate bowel regimen  · Benign abdomen on exam  · Monitor clinically    Squamous cell carcinoma right lung  · Defer further management to hematology oncology  · No evidence of hemoptysis as source of anemia    Hypertension  · Goal while inpatient is a systolic blood pressure less than 160mmHg  · BP in acceptable range at this time  · Continue current home regimen with hold parameters  · PRN antihypertensives available    Coronary artery disease with history of MI   · No evidence of acute coronary syndrome at this time  · Maintain adequate blood pressure control  · Heart healthy diet  · Aspirin on hold until transfusion complete and hemorrhage has been ruled out  · Statin    Hyperlipidemia   · Lipid panel - as an outpatient  · Cardiac diet  · Continue statin    COPD   · No acute issues  · Continue with home medication regimen  · Nebulizer treatments as needed    Obstructive sleep anpea  · Recommend use of CPAP for SUNITHA  · Can try Melatonin OTC for sleep at night  · Must eliminant day time naps / improve sleep hygiene reviewed  · Remove electronics from bedroom all to help with difficulty with sleep at night.            VTE Risk Mitigation         Ordered     enoxaparin injection 40 mg  Daily      06/11/18 1735     IP VTE HIGH RISK PATIENT  Once      06/11/18 1735            Adult PRN medications available   DVT  prophylaxis given       DISPOSITION:     Will admit to the Hospital Medicine service for further evaluation and treatment.    Chart reviewed and updated where applicable.    High Risk Conditions:  Patient has a condition that poses threat to life and bodily function: Severe symptomatic anemia      ===============================================================    Kameron Babb MD, MPH  Department of Hospital Medicine   [facility]  [phone] pg  (7pm - 6am)          This note is dictated using Dragon Medical 360 voice recognition software.  There are word recognition mistakes that are occasionally missed on review.  Tremulous and this without tapping her foot involving her head.

## 2018-06-12 NOTE — HPI
Ms. Castellano is a aleksandra 71 yo woman with stage IV squamous cell lung cancer currently on chemo, breast cancer s/p lumpectomy and XRT 2014, remote cervical cancer, COPD, MI/CAD with stents x2, HTN, HLD, COPD, and PUD who presentd to the ED c/o acute onset of 1 week old bilateral lower abdominal pain that radiates to the back. The pain is associated with constipation, decreased urine output that is dark, and nausea. She had a bowel movement the day prior to admission and states her stool was hard.  Denies hematemesis, melena, hematochezia.  Denies hemoptysis or epistaxis.  No other obvious source of bleeding.  Pain worsened with ambulation and palpation.  Moderate to severe intensity.  No relief with bowel movement.  Daily frequency.  Constant duration.

## 2018-06-12 NOTE — PROGRESS NOTES
PCP and Oncology appointments scheduled.    Follow-up Information     Jeannine Huitron MD On 6/20/2018.    Specialty:  Family Medicine  Why:  On Wednesday @ 9:20am, outpatient services  Contact information:  [address]  [place] [address]  [phone]             Mildred Holliday MD On 6/15/2018.    Specialties:  Hematology and Oncology, Hematology  Why:  On Friday @ 8:15am, outpatient services  Contact information:  [address] [place] [id number]  [phone]

## 2018-06-13 PROBLEM — D64.81 ANTINEOPLASTIC CHEMOTHERAPY INDUCED ANEMIA: Status: RESOLVED | Noted: 2018-06-12 | Resolved: 2018-06-13

## 2018-06-13 PROBLEM — T45.1X5A ANTINEOPLASTIC CHEMOTHERAPY INDUCED ANEMIA: Status: RESOLVED | Noted: 2018-06-12 | Resolved: 2018-06-13

## 2018-06-13 PROBLEM — D64.9 SEVERE ANEMIA: Status: RESOLVED | Noted: 2018-06-11 | Resolved: 2018-06-13

## 2018-06-13 PROBLEM — D70.1 CHEMOTHERAPY INDUCED NEUTROPENIA: Status: RESOLVED | Noted: 2018-06-12 | Resolved: 2018-06-13

## 2018-06-13 PROBLEM — T45.1X5A CHEMOTHERAPY INDUCED NEUTROPENIA: Status: RESOLVED | Noted: 2018-06-12 | Resolved: 2018-06-13

## 2018-06-13 NOTE — HOSPITAL COURSE
In the emergency department routine laboratory studies were obtained which revealed evidence of severe anemia with hemoglobin of 4.9 as well as neutropenia with white blood cell count of 910, .  She was ordered to be transfused 3 units of packed red blood cells. Hem/Onc was consulted. Her vitals signs were stable and her symptoms were progressive onset with no reports of bleed source of melena consistent with chemo-associates anemia and neutropenia. Her hemoglobin improved to 12.3 with 3 units of blood and her neutropenia resolved. Her symptoms resolved and she was asking to go home. She was evaluated by hem/onc prior to discharge from the hospital who agreed she was doing well and stable to go home.

## 2018-06-13 NOTE — DISCHARGE SUMMARY
[facility] Hospital Medicine  Discharge Summary      Patient Name: Ade Castellano  MRN: [id number]  Admission Date: 6/11/2018  Hospital Length of Stay: 1 days  Discharge Date and Time: 6/12/2018  1:02 PM  Attending Physician: No att. providers found   Discharging Provider: Eli Aquino MD  Primary Care Provider: Jeannine Huitron MD      HPI:   Ms. Castellano is a aleksandar 71 yo woman with stage IV squamous cell lung cancer currently on chemo, breast cancer s/p lumpectomy and XRT 2014, remote cervical cancer, COPD, MI/CAD with stents x2, HTN, HLD, COPD, and PUD who presentd to the ED c/o acute onset of 1 week old bilateral lower abdominal pain that radiates to the back. The pain is associated with constipation, decreased urine output that is dark, and nausea. She had a bowel movement the day prior to admission and states her stool was hard.  Denies hematemesis, melena, hematochezia.  Denies hemoptysis or epistaxis.  No other obvious source of bleeding.  Pain worsened with ambulation and palpation.  Moderate to severe intensity.  No relief with bowel movement.  Daily frequency.  Constant duration.    Hospital Course:   In the emergency department routine laboratory studies were obtained which revealed evidence of severe anemia with hemoglobin of 4.9 as well as neutropenia with white blood cell count of 910, .  She was ordered to be transfused 3 units of packed red blood cells. Hem/Onc was consulted. Her vitals signs were stable and her symptoms were progressive onset with no reports of bleed source of melena consistent with chemo-associates anemia and neutropenia. Her hemoglobin improved to 12.3 with 3 units of blood and her neutropenia resolved. Her symptoms resolved and she was asking to go home. She was evaluated by hem/onc prior to discharge from the hospital who agreed she was doing well and stable to go home.     Consults:   Consults         Status Ordering Provider     Inpatient  consult to Hematology/Oncology  Once     Provider:  Mildred Holliday MD    Completed BUCK RUSSO        Final Active Diagnoses:    Diagnosis Date Noted POA    Squamous cell carcinoma of right lung [C34.91] 05/07/2018 Yes     Chronic    COPD (chronic obstructive pulmonary disease) [J44.9] 02/06/2018 Yes     Chronic    Hyperlipidemia LDL goal <70 [E78.5] 10/14/2014 Yes     Chronic    Stable angina [I20.8] 10/14/2014 Yes     Chronic    SUNITHA (obstructive sleep apnea) [G47.33]  Yes     Chronic    History of breast cancer [Z85.3] 07/11/2014 Not Applicable     Chronic    Benign hypertensive heart disease without heart failure [I11.9] 08/23/2013 Yes     Chronic    CAD (coronary artery disease) [I25.10] 08/23/2013 Yes     Chronic      Problems Resolved During this Admission:    Diagnosis Date Noted Date Resolved POA    PRINCIPAL PROBLEM:  Severe anemia [D64.9] 06/11/2018 06/13/2018 Yes    Hypomagnesemia [E83.42] 06/12/2018 06/12/2018 Yes    Chemotherapy induced neutropenia [D70.1, T45.1X5A] 06/12/2018 06/13/2018 Yes    Antineoplastic chemotherapy induced anemia [D64.81, T45.1X5A] 06/12/2018 06/13/2018 Yes       Discharged Condition: stable    Disposition: Home or Self Care    Follow Up:  Follow-up Information     Jeannine Huitron MD On 6/20/2018.    Specialty:  Family Medicine  Why:  On Wednesday @ 9:20am, outpatient services  Contact information:  [address] [place] [address]  [phone]             Mildred Holliday MD On 6/15/2018.    Specialties:  Hematology and Oncology, Hematology  Why:  On Friday @ 8:15am, outpatient services  Contact information:  [address]  [facility] [id number] [phone]                 Patient Instructions:     Diet Cardiac     Activity as tolerated     Notify your health care provider if you experience any of the following:  increased confusion or weakness     Notify your health care provider if you experience any of the following:   persistent dizziness, light-headedness, or visual disturbances     Notify your health care provider if you experience any of the following:  worsening rash     Notify your health care provider if you experience any of the following:  severe persistent headache     Notify your health care provider if you experience any of the following:  difficulty breathing or increased cough     Notify your health care provider if you experience any of the following:  redness, tenderness, or signs of infection (pain, swelling, redness, odor or green/yellow discharge around incision site)     Notify your health care provider if you experience any of the following:  severe uncontrolled pain     Notify your health care provider if you experience any of the following:  persistent nausea and vomiting or diarrhea     Notify your health care provider if you experience any of the following:  temperature >100.4       Medications:  Reconciled Home Medications:      Medication List      CHANGE how you take these medications    rosuvastatin 20 MG tablet  Commonly known as:  CRESTOR  Take 1 tablet (20 mg total) by mouth once daily.  What changed:  how much to take        CONTINUE taking these medications    * albuterol 90 mcg/actuation inhaler  Commonly known as:  VENTOLIN HFA  INHALE 2 PUFF(S) BY MOUTH EVERY 4 - 6 HOURS AS NEEDED FOR SHORTNESS OF BREATH or WHEEZING     * albuterol 2.5 mg /3 mL (0.083 %) nebulizer solution  Commonly known as:  PROVENTIL  NEBULIZE 1 vial EVERY 6 HOURS AS NEEDED FOR WHEEZING     aspirin 325 MG EC tablet  Commonly known as:  ECOTRIN  Take 325 mg by mouth once daily.     ciprofloxacin HCl 0.3 % ophthalmic solution  Commonly known as:  CILOXAN  1 drop in surgical eye four times a day; START 2 DAYS BEFORE SURGERY     dexamethasone 4 MG Tab  Commonly known as:  DECADRON  Take 5 tablets (20 mg total) by mouth As instructed. Take the night before chemo & the morning of chemo.     DUREZOL 0.05 % Drop ophthalmic  solution  Generic drug:  difluprednate  2 drops in surgical eye twice a day; START AFTER SURGERY; FOR 30 DAYS     epinastine 0.05 % ophthalmic solution  PLACE 1 DROP(S) IN BOTH EYES TWICE DAILY     fluticasone 50 mcg/actuation nasal spray  Commonly known as:  FLONASE  USE TWO SPRAYS IN EACH NOSTRIL ONCE A DAY     HYDROcodone-acetaminophen 5-325 mg per tablet  Commonly known as:  NORCO  Take 1 tablet by mouth every 6 (six) hours as needed for Pain.     isosorbide mononitrate 60 MG 24 hr tablet  Commonly known as:  IMDUR  Take 30 mg by mouth once daily.     metoprolol tartrate 25 MG tablet  Commonly known as:  LOPRESSOR  Take 25 mg by mouth 2 (two) times daily.     NITROSTAT 0.4 MG SL tablet  Generic drug:  nitroGLYCERIN  place 1 tablet under the tongue AS NEEDED no more than 3 in 15 minutes     prochlorperazine 10 MG tablet  Commonly known as:  COMPAZINE  Take 1 tablet (10 mg total) by mouth every 6 (six) hours as needed.     SPIRIVA RESPIMAT 2.5 mcg/actuation Mist  Generic drug:  tiotropium bromide  Inhale 1 puff into the lungs once daily.     VITAMIN C 500 MG tablet  Generic drug:  ascorbic acid (vitamin C)  Take 500 mg by mouth once daily.        * This list has 2 medication(s) that are the same as other medications prescribed for you. Read the directions carefully, and ask your doctor or other care provider to review them with you.              Time spent on the discharge of patient: 45 minutes  Patient was seen and examined on the date of discharge and determined to be suitable for discharge.         Eli Aquino MD  Department of Hospital Medicine  [facility]

## 2018-06-15 ENCOUNTER — OFFICE VISIT (OUTPATIENT)
Dept: HEMATOLOGY/ONCOLOGY | Facility: CLINIC | Age: 73
End: 2018-06-15
Payer: MEDICARE

## 2018-06-15 ENCOUNTER — TELEPHONE (OUTPATIENT)
Dept: HEMATOLOGY/ONCOLOGY | Facility: CLINIC | Age: 73
End: 2018-06-15

## 2018-06-15 VITALS
BODY MASS INDEX: 30.2 KG/M2 | DIASTOLIC BLOOD PRESSURE: 64 MMHG | HEART RATE: 62 BPM | HEIGHT: 63 IN | TEMPERATURE: 98 F | OXYGEN SATURATION: 95 % | SYSTOLIC BLOOD PRESSURE: 111 MMHG | WEIGHT: 170.44 LBS

## 2018-06-15 DIAGNOSIS — Z85.3 HISTORY OF BREAST CANCER: Chronic | ICD-10-CM

## 2018-06-15 DIAGNOSIS — C34.91 SQUAMOUS CELL CARCINOMA OF RIGHT LUNG: Primary | Chronic | ICD-10-CM

## 2018-06-15 DIAGNOSIS — Z09 CHEMOTHERAPY FOLLOW-UP EXAMINATION: ICD-10-CM

## 2018-06-15 DIAGNOSIS — C34.90 MALIGNANT NEOPLASM OF LUNG, UNSPECIFIED LATERALITY, UNSPECIFIED PART OF LUNG: Primary | ICD-10-CM

## 2018-06-15 DIAGNOSIS — J44.9 CHRONIC OBSTRUCTIVE PULMONARY DISEASE, UNSPECIFIED COPD TYPE: Chronic | ICD-10-CM

## 2018-06-15 PROCEDURE — 99214 OFFICE O/P EST MOD 30 MIN: CPT | Mod: S$GLB,,, | Performed by: INTERNAL MEDICINE

## 2018-06-15 PROCEDURE — 99499 UNLISTED E&M SERVICE: CPT | Mod: S$GLB,,, | Performed by: INTERNAL MEDICINE

## 2018-06-15 PROCEDURE — 99999 PR PBB SHADOW E&M-EST. PATIENT-LVL IV: CPT | Mod: PBBFAC,,, | Performed by: INTERNAL MEDICINE

## 2018-06-15 RX ORDER — BROMFENAC SODIUM 0.7 MG/ML
SOLUTION/ DROPS OPHTHALMIC
Refills: 1 | COMMUNITY
Start: 2018-05-26 | End: 2018-08-23

## 2018-06-15 NOTE — Clinical Note
Cbc on MOn PRIOR TO CHEMO Chemo mon ( pending lab parameters)  Cbc,cmp prior to weekly chemo with cycle 6

## 2018-06-15 NOTE — PROGRESS NOTES
Subjective:       Patient ID: Ade Castellano is a 72 y.o. female.    Chief Complaint: Follow-up    HPI   Diagnosis: Stage IV cancer Squamous Cell CA lung     HPI 73 y/o female seen today for 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     She has  History of Stage 1A breast cancer Grade 3, ER/ME neg , Her 2 jose maria neg s/p lumpectomy 7/11/2014 and s/p adjuvant RT 9/2014 Pt declined Adjuvant chemo.    Previously lost to follow-up        Pt hospitalized 11/2017   Ms. Castellano was admitted for acute on chronic respiratory failure requiring intubation and ventilation. Has large lung mass in right lung with probable post obstructive pneumonia resulting in COPD exacerbation. But also, patient had ran out of home O2 prior to onset of symptoms, likely contributing to presentation. Initiated on broad spectrum abx, IV steroids, duo-nebs and pulmonology consulted for ventilator co-management. Successfully extubated to BiPAP on 11/30. Then de-escalated to low flow nasal cannula. Abx tailored to augmentin for broad coverage, including anaerobic bacteria /     CTA chest 11/29/2017 reveals   Large right hilar mass with involvement of adjacent segmental pulmonary arterial branches and bronchi with associated postobstructive atelectasis and volume loss in the right middle lobe with adjacent ground glass opacity.  Findings highly concerning for underlying neoplastic process. Mediastinal and axillary adenopathy, with a right axillary lymph node measuring up to 2.0 cm in short axis diameter. No definite evidence of pulmonary thromboembolism.    She is followed by Pulm  She underwent rt axillary LN bx at outside facility- on 1/16/2018 at [facility]  Pathology revealed metastatic poorly differentiated carcinoma of unknown primary site  TTF-1 negative, napsin negative  , cytokeratin 7 positive, cytokeratin 20 negative, p63 negative, cytokeratin 5/6 focal dim positivity    She next underwent lung bx at [facility] l1/31/2017   Pathology  revealed benign lung tissue    She underwent repeat Right Lung Bx 2/14/2018 Pathology reveals Squamous cell carcinoma  PD-L1 10% low expression  EGFR NEG  ALK NEG  BRAF pending  Outside slides axillary LN specimen  requested for review/comparison to lung bx findings     Pt hospitalized  6/12/2018 with severe anemia with Hb 4.9g/dl  as well as neutropenia with white blood cell count of 910, .    She underwent 3 units prbc with improvement in Hb to 12.3g/dl and discharged next day     She Is undergoing cycle 5 of carboplatin/Abraxane Day 8     Today, she is doing well  She feels much better  She continues with occasional cough  Mild LOGAN-chronic,stable   Appetite and weight  stable  No N/V  No hemoptysis   Mild arthralgias- chronic,stable  No bleeding-urinary/rectal/nasal     PET/CT  4/19/2018 reveals there has been a excellent response to therapy.  At least 90% reduction in malignant activity.           PrevHx:She had an abnormal mammogram 6/4/2014 which  Revealed a round solid mass 6mm in rt breast.  She then underwent U/S guided core bx of rt breast mass on 6/17/2014.  Pathology revealed infiltrating ductal carcinoma Grade 3 with tumor  Present in thin-walled spaces suggestive of lymphatic spaces. Hormone  Receptor status on tumor specimen revealed ER negative 0% ,  HI negative 0% and Her 2 jose maria negative.  She subsequently underwent rt segmental mastectomy and SLN bx  On 7/11/2014. Pathology of rt breast lumpectomy revealed invasive  Ductal carcinoma with micropapillary pattern ( Invasive micropapillary  Ca) with max tumor dimension 11.5mm with suggestion of tumor in   Thin walled spaces c/w lymphovascular involvement. Surgical  Margins free of tumor, grade 3, ER/HI neg , Her 2 jose maria neg   With [place] Lymph node negative for Neoplasm.   Pathologic staging [id number](i-)  Her mother was diagnosed with breast cancer in her 50's/  She has no family history of ovarian cancer.           Review of Systems  "  Constitutional: Negative for appetite change, fatigue, fever and unexpected weight change.   HENT: Negative for mouth sores and rhinorrhea.    Eyes: Negative for visual disturbance.   Respiratory: Positive for cough (improved) and shortness of breath (LOGAN significantly improved).    Cardiovascular: Negative for chest pain.   Gastrointestinal: Negative for abdominal pain and diarrhea.   Genitourinary: Negative for frequency.   Musculoskeletal: Positive for arthralgias and back pain.   Skin: Negative for rash.   Neurological: Negative for headaches.   Hematological: Negative for adenopathy.   Psychiatric/Behavioral: The patient is not nervous/anxious.        Objective:        Vitals:    06/15/18 0812   BP: 111/64   BP Location: Left arm   Patient Position: Sitting   BP Method: Medium (Automatic)   Pulse: 62   Temp: 97.9 °F (36.6 °C)   TempSrc: Oral   SpO2: 95%   Weight: 77.3 kg (170 lb 6.7 oz)   Height: 5' 3" (1.6 m)         Physical Exam   Constitutional: She is oriented to person, place, and time. She appears well-developed and well-nourished.   HENT:   Head: Normocephalic.   Mouth/Throat: Oropharynx is clear and moist. No oropharyngeal exudate.   Eyes: Conjunctivae and lids are normal. Pupils are equal, round, and reactive to light. No scleral icterus.   Neck: Normal range of motion. Neck supple. No thyromegaly present.   Cardiovascular: Normal rate, regular rhythm and normal heart sounds.    No murmur heard.  Pulmonary/Chest: She has no wheezes. Right breast exhibits no inverted nipple, no mass, no nipple discharge and no skin change. Left breast exhibits no inverted nipple, no mass, no nipple discharge and no skin change.   Bibasilar crackles R>L   Abdominal: Soft. Bowel sounds are normal. She exhibits no distension and no mass. There is no hepatosplenomegaly. There is no tenderness. There is no rebound and no guarding.   Musculoskeletal: Normal range of motion. She exhibits no edema or tenderness. "   Lymphadenopathy:     She has no cervical adenopathy.     She has no axillary adenopathy.        Right: No supraclavicular adenopathy present.        Left: No supraclavicular adenopathy present.   Neurological: She is alert and oriented to person, place, and time. No cranial nerve deficit. Coordination normal.   Skin: Skin is warm and dry. No ecchymosis, no petechiae and no rash noted. No erythema.   Psychiatric: She has a normal mood and affect.             CT a/p w/contrast 11/29/2018   1. No acute abnormality identified within the abdomen and pelvis.    2.  There are a few nonspecific prominent lymph nodes in the upper abdomen including a periesophageal lymph node which measures 1.0 cm in short axis diameter.    3.  Significant abdominal aortic atherosclerosis and abdominal aorta ectasia.    4.  Additional findings as above.    PET/CT 1/26/2018   1.  Intense FDG uptake within the known right infrahilar lung mass, compatible with malignancy.  It is unclear if this represents primary lung malignancy or metastatic breast cancer.    2.  Intensely hypermetabolic right axillary, retropectoral, and lower right cervical lymph nodes, compatible with metastases.    3.  Abnormal FDG uptake along right breast skin thickening, new from prior chest CTA and mammogram.  This may relate to localized edema/inflammation, though correlation with physical exam and mammography are recommended to exclude underlying inflammatory carcinoma.      MRI Brain w w/out contrast 2/9/2018  1.  No evidence of intracranial metastases.  2.  Sinus disease       Rt axillary LN bx at outside facility- on 1/16/2018 at [facility]  Pathology revealed metastatic poorly differentiated carcinoma of unknown primary  site 1/16/2018 at [facility]  TTF-1 negative, napsin negative  , cytokeratin 7 positive, cytokeratin 20 negative, p63 negative, cytokeratin 5/6 focal dim positivity    LUNG BIOPSY 1/31/2017   Pathology revealed benign  lung tissue         SPECIMEN  1) Right Lung Bx 2/14/2018  Supplemental Diagnosis  Immunohistochemical stains show strong nuclear staining for p63 in essentially all tumor cells and very strong  cytoplasmic and membrane staining for CK5/6, also in essentially all tumor cells. TTF-1 and CK7 are negative  within tumor cells but do stain native pulmonary elements present within the biopsy. A stain for mucicarmine is  negative. Positive and negative controls function appropriately.  Final diagnosis: Specimen submitted as right lung biopsy  -Squamous cell carcinoma  (Electronically Signed: 2018-02-20 09:12:07 )  Diagnosed by: Phong Thompson  FINAL PATHOLOGIC DIAGNOSIS  Fragments of pulmonary parenchyma (submitted as right lung biopsy):    FINAL PATHOLOGIC DIAGNOSIS 1. Lymph node, right axilla, biopsy, review of 10 outside slides [facility], JS [phone],  collected January 11, 2018: Metastatic poorly differentiated carcinoma (see comment).  The histologic section shows fibrous stroma infiltrated by nest of poorly differentiated malignant cells including  occasional dyskeratotic cells morphologically suggestive of metastatic squamous cell carcinoma.  Immunohistochemical stains are nonspecific; the cells are positive for cytokeratin 7 and cytokeratin 5/6 (focal) and  negative for cytokeratin 20, TTF-1, p63, GCDFP, mammoglobin, and CEA        PET/CT 4/19/2018 There has been a excellent response to therapy.  At least 90% reduction in malignant activity    Assessment:       1. Squamous cell carcinoma of right lung    2. Chemotherapy follow-up examination    3. History of breast cancer    4. Chronic obstructive pulmonary disease, unspecified COPD type        Plan:   ECOG 1  1-4 Pt with widely metastatic squamous cell CA lung     Pt with hx of Stage 1A invasive ductal carcinoma rt breast s/p rt segmental mastectomy and SLN bx 7/11/2014 ER/CA neg HER 2 jose maria neg [id number](i-).  S/p adjuvant RT completed 9/2014 Pt  declined adjuvant chemo  Follow-up Mammo 6/12/2017 No mammographic evidence of malignancy.  Pt  hospitalized 11/2017 with acute-on-chronic  resp failure  Abnormal CT imaging revealing Large right hilar mass with involvement of  adjacent segmental pulmonary arterial branches and bronchi with associated postobstructive atelectasis  and involvement of mediastinal and axillary adenopathy   S/p rt axillary LN bx ( outside facility) - metastatic poorly differentiated carcinoma of unknown primary  site  status post recent lung biopsy-benign lung tissue  PET/CT 1/26/2018   Intense FDG uptake within the known right infrahilar lung mass, compatible with malignancy.   Intensely hypermetabolic right axillary, retropectoral, and lower right cervical lymph nodes, compatible with metastases.  Abnormal FDG uptake along right breast skin thickening, new from prior chest CTA and mammogram.    Repeat lung bx reveals Squamous Cell CA lung  PD-L1 10% low expression  EGFR NEG  ALK NEG  BRAF pending    Proceed with carboplatin/abraxane cycle 5 Day 15 today   Proceed with cycle 6 of chemo as planned (pending lab parameters)  Planned follow-up imaging studies upon completion of cycle 6 to assess disease status    Cbc,cmp prior to weekly chemo     Follow-up  3 weeks with cbc,cmp prior to f/u       Cc: Swetha Katz M.D.         MD oTry Roberson MD

## 2018-06-18 ENCOUNTER — INFUSION (OUTPATIENT)
Dept: INFUSION THERAPY | Facility: HOSPITAL | Age: 73
End: 2018-06-18
Attending: INTERNAL MEDICINE
Payer: MEDICARE

## 2018-06-18 VITALS
SYSTOLIC BLOOD PRESSURE: 106 MMHG | HEART RATE: 60 BPM | RESPIRATION RATE: 17 BRPM | OXYGEN SATURATION: 97 % | DIASTOLIC BLOOD PRESSURE: 60 MMHG | TEMPERATURE: 98 F

## 2018-06-18 NOTE — PLAN OF CARE
Problem: Patient Care Overview  Goal: Plan of Care Review  Outcome: Ongoing (interventions implemented as appropriate)  Pt went lab prior to Infusion. Plts 80k. ANC 1100. Chemo held. Will recheck labs next week. Pt informed and verbalized understanding.

## 2018-06-20 ENCOUNTER — LAB VISIT (OUTPATIENT)
Dept: LAB | Facility: HOSPITAL | Age: 73
End: 2018-06-20
Attending: FAMILY MEDICINE
Payer: MEDICARE

## 2018-06-20 ENCOUNTER — OFFICE VISIT (OUTPATIENT)
Dept: FAMILY MEDICINE | Facility: CLINIC | Age: 73
End: 2018-06-20
Payer: MEDICARE

## 2018-06-20 VITALS
DIASTOLIC BLOOD PRESSURE: 64 MMHG | SYSTOLIC BLOOD PRESSURE: 110 MMHG | HEART RATE: 59 BPM | BODY MASS INDEX: 30.43 KG/M2 | TEMPERATURE: 98 F | HEIGHT: 63 IN | WEIGHT: 171.75 LBS | OXYGEN SATURATION: 96 %

## 2018-06-20 DIAGNOSIS — Z92.21 S/P CHEMOTHERAPY, TIME SINCE 4-12 WEEKS: Primary | ICD-10-CM

## 2018-06-20 DIAGNOSIS — D64.9 ANEMIA, UNSPECIFIED TYPE: ICD-10-CM

## 2018-06-20 DIAGNOSIS — Z92.21 S/P CHEMOTHERAPY, TIME SINCE 4-12 WEEKS: ICD-10-CM

## 2018-06-20 DIAGNOSIS — D69.6 THROMBOCYTOPENIA: ICD-10-CM

## 2018-06-20 DIAGNOSIS — C34.91 SQUAMOUS CELL CARCINOMA OF RIGHT LUNG: ICD-10-CM

## 2018-06-20 LAB
BASOPHILS # BLD AUTO: 0.02 K/UL
BASOPHILS NFR BLD: 0.6 %
DIFFERENTIAL METHOD: ABNORMAL
EOSINOPHIL # BLD AUTO: 0 K/UL
EOSINOPHIL NFR BLD: 0.3 %
ERYTHROCYTE [DISTWIDTH] IN BLOOD BY AUTOMATED COUNT: 17.7 %
HCT VFR BLD AUTO: 33.5 %
HGB BLD-MCNC: 11.4 G/DL
LYMPHOCYTES # BLD AUTO: 1.3 K/UL
LYMPHOCYTES NFR BLD: 36.4 %
MCH RBC QN AUTO: 31.7 PG
MCHC RBC AUTO-ENTMCNC: 34 G/DL
MCV RBC AUTO: 93 FL
MONOCYTES # BLD AUTO: 0.4 K/UL
MONOCYTES NFR BLD: 11.8 %
NEUTROPHILS # BLD AUTO: 1.7 K/UL
NEUTROPHILS NFR BLD: 49.2 %
PLATELET # BLD AUTO: 108 K/UL
PMV BLD AUTO: 10.7 FL
RBC # BLD AUTO: 3.6 M/UL
WBC # BLD AUTO: 3.46 K/UL

## 2018-06-20 PROCEDURE — 85025 COMPLETE CBC W/AUTO DIFF WBC: CPT

## 2018-06-20 PROCEDURE — 99499 UNLISTED E&M SERVICE: CPT | Mod: S$GLB,,, | Performed by: FAMILY MEDICINE

## 2018-06-20 PROCEDURE — 36415 COLL VENOUS BLD VENIPUNCTURE: CPT | Mod: PO

## 2018-06-20 PROCEDURE — 99214 OFFICE O/P EST MOD 30 MIN: CPT | Mod: S$GLB,,, | Performed by: FAMILY MEDICINE

## 2018-06-20 PROCEDURE — 99999 PR PBB SHADOW E&M-EST. PATIENT-LVL III: CPT | Mod: PBBFAC,,, | Performed by: FAMILY MEDICINE

## 2018-06-20 NOTE — PROGRESS NOTES
"Subjective:       Patient ID: Ade Castellano is a 72 y.o. female.    Chief Complaint: Hospital Follow Up    HPI  Review of Systems   Constitutional: Positive for fatigue. Negative for chills and fever.   Respiratory: Positive for cough and shortness of breath. Negative for chest tightness and wheezing.         +productive with clear phlgem   Gastrointestinal: Negative for blood in stool.   Genitourinary: Negative for hematuria.       Objective:       Vitals:    06/20/18 0938   BP: 110/64   Pulse: (!) 59   Temp: 97.7 °F (36.5 °C)   TempSrc: Oral   SpO2: 96%   Weight: 77.9 kg (171 lb 11.8 oz)   Height: 5' 3" (1.6 m)       Physical Exam   Constitutional: She is oriented to person, place, and time. She appears well-developed and well-nourished. No distress.   HENT:   Head: Normocephalic and atraumatic.   Neck: Normal range of motion. Neck supple.   Cardiovascular: Normal rate, regular rhythm and normal heart sounds.  Exam reveals no gallop and no friction rub.    No murmur heard.  Pulmonary/Chest: Effort normal and breath sounds normal. No respiratory distress. She has no wheezes. She has no rales.   Musculoskeletal: She exhibits no edema.   Neurological: She is alert and oriented to person, place, and time.   Skin: Capillary refill takes less than 2 seconds. She is not diaphoretic.       Assessment:       1. S/P chemotherapy, time since 4-12 weeks    2. Anemia, unspecified type    3. Thrombocytopenia    4. Squamous cell carcinoma of right lung        Plan:       Ade was seen today for hospital follow up.    Diagnoses and all orders for this visit:    S/P chemotherapy, time since 4-12 weeks  -     CBC auto differential; Future    Anemia, unspecified type  -     CBC auto differential; Future    Thrombocytopenia  -     CBC auto differential; Future    Squamous cell carcinoma of right lung           "

## 2018-06-20 NOTE — PROGRESS NOTES
"Subjective:       Patient ID: Ade Castellano is a 72 y.o. female.    Chief Complaint: Hospital Follow Up    roque plascencia re for hospital discharge.      Hospital Course:   In the emergency department routine laboratory studies were obtained which revealed evidence of severe anemia with hemoglobin of 4.9 as well as neutropenia with white blood cell count of 910, .  She was ordered to be transfused 3 units of packed red blood cells. Hem/Onc was consulted. Her vitals signs were stable and her symptoms were progressive onset with no reports of bleed source of melena consistent with chemo-associates anemia and neutropenia. Her hemoglobin improved to 12.3 with 3 units of blood and her neutropenia resolved. Her symptoms resolved and she was asking to go home. She was evaluated by hem/onc prior to discharge from the hospital who agreed she was doing well and stable to go home.        She is here today stating she feels mildly short of breath and had some dizziness with coughing. She has had this in the past, when she was anemic. She would like her level rechecked.       Review of Systems    Objective:       Vitals:    06/20/18 0938   BP: 110/64   Pulse: (!) 59   Temp: 97.7 °F (36.5 °C)   TempSrc: Oral   SpO2: 96%   Weight: 77.9 kg (171 lb 11.8 oz)   Height: 5' 3" (1.6 m)       Physical Exam   Constitutional: She is oriented to person, place, and time. She appears well-developed and well-nourished. No distress.   HENT:   Head: Normocephalic and atraumatic.   Neck: Normal range of motion. Neck supple.   Cardiovascular: Normal rate, regular rhythm and normal heart sounds.  Exam reveals no gallop and no friction rub.    No murmur heard.  Pulmonary/Chest: Effort normal and breath sounds normal. No respiratory distress. She has no wheezes. She has no rales.   Neurological: She is alert and oriented to person, place, and time.   Skin: She is not diaphoretic.   Psychiatric: She has a normal mood and affect.     "   Assessment:       1. S/P chemotherapy, time since 4-12 weeks    2. Anemia, unspecified type    3. Thrombocytopenia    4. Squamous cell carcinoma of right lung        Plan:       Ade was seen today for hospital follow up.    Diagnoses and all orders for this visit:    S/P chemotherapy, time since 4-12 weeks  -     CBC auto differential; Future  Will recheck CBC  To see if the anemia is worsening and to monitor her platelet count.   Anemia, unspecified type  -     CBC auto differential; Future    Thrombocytopenia  -     CBC auto differential; Future    Squamous cell carcinoma of right lung  Per heme-onc

## 2018-06-25 ENCOUNTER — INFUSION (OUTPATIENT)
Dept: INFUSION THERAPY | Facility: HOSPITAL | Age: 73
End: 2018-06-25
Attending: INTERNAL MEDICINE
Payer: MEDICARE

## 2018-06-25 VITALS
TEMPERATURE: 98 F | OXYGEN SATURATION: 94 % | HEART RATE: 63 BPM | RESPIRATION RATE: 17 BRPM | DIASTOLIC BLOOD PRESSURE: 66 MMHG | SYSTOLIC BLOOD PRESSURE: 143 MMHG

## 2018-06-25 DIAGNOSIS — C34.90 SQUAMOUS CELL CARCINOMA OF LUNG, UNSPECIFIED LATERALITY: Primary | ICD-10-CM

## 2018-06-25 PROCEDURE — 96417 CHEMO IV INFUS EACH ADDL SEQ: CPT

## 2018-06-25 PROCEDURE — 96367 TX/PROPH/DG ADDL SEQ IV INF: CPT

## 2018-06-25 PROCEDURE — 96413 CHEMO IV INFUSION 1 HR: CPT

## 2018-06-25 PROCEDURE — 25000003 PHARM REV CODE 250: Performed by: INTERNAL MEDICINE

## 2018-06-25 PROCEDURE — 63600175 PHARM REV CODE 636 W HCPCS: Mod: JW,JG | Performed by: INTERNAL MEDICINE

## 2018-06-25 PROCEDURE — S0028 INJECTION, FAMOTIDINE, 20 MG: HCPCS | Performed by: INTERNAL MEDICINE

## 2018-06-25 PROCEDURE — A4216 STERILE WATER/SALINE, 10 ML: HCPCS | Performed by: INTERNAL MEDICINE

## 2018-06-25 RX ORDER — EPINEPHRINE 0.3 MG/.3ML
0.3 INJECTION SUBCUTANEOUS ONCE AS NEEDED
Status: CANCELLED | OUTPATIENT
Start: 2018-06-25 | End: 2018-06-25

## 2018-06-25 RX ORDER — DIPHENHYDRAMINE HYDROCHLORIDE 50 MG/ML
50 INJECTION INTRAMUSCULAR; INTRAVENOUS ONCE AS NEEDED
Status: CANCELLED | OUTPATIENT
Start: 2018-06-25 | End: 2018-06-25

## 2018-06-25 RX ORDER — FAMOTIDINE 10 MG/ML
20 INJECTION INTRAVENOUS
Status: DISCONTINUED | OUTPATIENT
Start: 2018-06-25 | End: 2018-06-25

## 2018-06-25 RX ORDER — HEPARIN 100 UNIT/ML
500 SYRINGE INTRAVENOUS
Status: CANCELLED | OUTPATIENT
Start: 2018-06-25

## 2018-06-25 RX ORDER — SODIUM CHLORIDE 0.9 % (FLUSH) 0.9 %
10 SYRINGE (ML) INJECTION
Status: DISCONTINUED | OUTPATIENT
Start: 2018-06-25 | End: 2018-06-25 | Stop reason: HOSPADM

## 2018-06-25 RX ORDER — HEPARIN 100 UNIT/ML
500 SYRINGE INTRAVENOUS
Status: DISCONTINUED | OUTPATIENT
Start: 2018-06-25 | End: 2018-06-25 | Stop reason: HOSPADM

## 2018-06-25 RX ORDER — SODIUM CHLORIDE 0.9 % (FLUSH) 0.9 %
10 SYRINGE (ML) INJECTION
Status: CANCELLED | OUTPATIENT
Start: 2018-06-25

## 2018-06-25 RX ORDER — FAMOTIDINE 10 MG/ML
20 INJECTION INTRAVENOUS
Status: CANCELLED | OUTPATIENT
Start: 2018-06-25

## 2018-06-25 RX ORDER — FAMOTIDINE 20 MG/50ML
20 INJECTION, SOLUTION INTRAVENOUS
Status: COMPLETED | OUTPATIENT
Start: 2018-06-25 | End: 2018-06-25

## 2018-06-25 RX ADMIN — SODIUM CHLORIDE, PRESERVATIVE FREE 10 ML: 5 INJECTION INTRAVENOUS at 12:06

## 2018-06-25 RX ADMIN — DIPHENHYDRAMINE HYDROCHLORIDE 50 MG: 50 INJECTION INTRAMUSCULAR; INTRAVENOUS at 10:06

## 2018-06-25 RX ADMIN — HEPARIN SODIUM (PORCINE) LOCK FLUSH IV SOLN 100 UNIT/ML 500 UNITS: 100 SOLUTION at 12:06

## 2018-06-25 RX ADMIN — PALONOSETRON HYDROCHLORIDE: 0.25 INJECTION INTRAVENOUS at 10:06

## 2018-06-25 RX ADMIN — CARBOPLATIN 570 MG: 10 INJECTION INTRAVENOUS at 11:06

## 2018-06-25 RX ADMIN — FAMOTIDINE 20 MG: 20 INJECTION, SOLUTION INTRAVENOUS at 10:06

## 2018-06-25 RX ADMIN — SODIUM CHLORIDE: 0.9 INJECTION, SOLUTION INTRAVENOUS at 11:06

## 2018-06-25 RX ADMIN — PACLITAXEL 170 MG: 100 INJECTION, POWDER, LYOPHILIZED, FOR SUSPENSION INTRAVENOUS at 12:06

## 2018-06-25 NOTE — PLAN OF CARE
Problem: Patient Care Overview  Goal: Plan of Care Review  Outcome: Ongoing (interventions implemented as appropriate)  Pt went to lab prior to arrival in Infusion. Dr. Holliday notified of cbc results and orders signed. Pt tolerated Carboplatin and Abraxane. No reactions noted. Pt given next appts.

## 2018-07-02 ENCOUNTER — LAB VISIT (OUTPATIENT)
Dept: LAB | Facility: HOSPITAL | Age: 73
End: 2018-07-02
Attending: INTERNAL MEDICINE
Payer: MEDICARE

## 2018-07-02 ENCOUNTER — INFUSION (OUTPATIENT)
Dept: INFUSION THERAPY | Facility: HOSPITAL | Age: 73
End: 2018-07-02
Attending: INTERNAL MEDICINE
Payer: MEDICARE

## 2018-07-02 VITALS
HEART RATE: 65 BPM | DIASTOLIC BLOOD PRESSURE: 72 MMHG | RESPIRATION RATE: 17 BRPM | TEMPERATURE: 98 F | SYSTOLIC BLOOD PRESSURE: 136 MMHG | OXYGEN SATURATION: 95 %

## 2018-07-02 DIAGNOSIS — C34.90 MALIGNANT NEOPLASM OF LUNG, UNSPECIFIED LATERALITY, UNSPECIFIED PART OF LUNG: ICD-10-CM

## 2018-07-02 DIAGNOSIS — C34.90 SQUAMOUS CELL CARCINOMA OF LUNG, UNSPECIFIED LATERALITY: Primary | ICD-10-CM

## 2018-07-02 LAB
ALBUMIN SERPL BCP-MCNC: 4.3 G/DL
ALP SERPL-CCNC: 39 U/L
ALT SERPL W/O P-5'-P-CCNC: 16 U/L
ANION GAP SERPL CALC-SCNC: 10 MMOL/L
AST SERPL-CCNC: 18 U/L
BASOPHILS # BLD AUTO: 0 K/UL
BASOPHILS NFR BLD: 0 %
BILIRUB SERPL-MCNC: 1 MG/DL
BUN SERPL-MCNC: 27 MG/DL
CALCIUM SERPL-MCNC: 10.4 MG/DL
CHLORIDE SERPL-SCNC: 102 MMOL/L
CO2 SERPL-SCNC: 28 MMOL/L
CREAT SERPL-MCNC: 1.2 MG/DL
DIFFERENTIAL METHOD: ABNORMAL
EOSINOPHIL # BLD AUTO: 0 K/UL
EOSINOPHIL NFR BLD: 0 %
ERYTHROCYTE [DISTWIDTH] IN BLOOD BY AUTOMATED COUNT: 16.7 %
EST. GFR  (AFRICAN AMERICAN): 53 ML/MIN/1.73 M^2
EST. GFR  (NON AFRICAN AMERICAN): 46 ML/MIN/1.73 M^2
GLUCOSE SERPL-MCNC: 225 MG/DL
HCT VFR BLD AUTO: 34.8 %
HGB BLD-MCNC: 12 G/DL
LYMPHOCYTES # BLD AUTO: 0.5 K/UL
LYMPHOCYTES NFR BLD: 22 %
MCH RBC QN AUTO: 31.5 PG
MCHC RBC AUTO-ENTMCNC: 34.5 G/DL
MCV RBC AUTO: 91 FL
MONOCYTES # BLD AUTO: 0 K/UL
MONOCYTES NFR BLD: 1 %
NEUTROPHILS # BLD AUTO: 1.6 K/UL
NEUTROPHILS NFR BLD: 76.5 %
PLATELET # BLD AUTO: 219 K/UL
PMV BLD AUTO: 9.4 FL
POTASSIUM SERPL-SCNC: 4.1 MMOL/L
PROT SERPL-MCNC: 7 G/DL
RBC # BLD AUTO: 3.81 M/UL
SODIUM SERPL-SCNC: 140 MMOL/L
WBC # BLD AUTO: 2.09 K/UL

## 2018-07-02 PROCEDURE — 96367 TX/PROPH/DG ADDL SEQ IV INF: CPT

## 2018-07-02 PROCEDURE — 96361 HYDRATE IV INFUSION ADD-ON: CPT

## 2018-07-02 PROCEDURE — 63600175 PHARM REV CODE 636 W HCPCS: Mod: JG | Performed by: INTERNAL MEDICINE

## 2018-07-02 PROCEDURE — 25000003 PHARM REV CODE 250: Performed by: INTERNAL MEDICINE

## 2018-07-02 PROCEDURE — 85025 COMPLETE CBC W/AUTO DIFF WBC: CPT

## 2018-07-02 PROCEDURE — 96413 CHEMO IV INFUSION 1 HR: CPT

## 2018-07-02 PROCEDURE — S0028 INJECTION, FAMOTIDINE, 20 MG: HCPCS | Performed by: INTERNAL MEDICINE

## 2018-07-02 PROCEDURE — 63600175 PHARM REV CODE 636 W HCPCS: Mod: JW,JG | Performed by: INTERNAL MEDICINE

## 2018-07-02 PROCEDURE — 80053 COMPREHEN METABOLIC PANEL: CPT

## 2018-07-02 PROCEDURE — 36415 COLL VENOUS BLD VENIPUNCTURE: CPT

## 2018-07-02 PROCEDURE — A4216 STERILE WATER/SALINE, 10 ML: HCPCS | Performed by: INTERNAL MEDICINE

## 2018-07-02 RX ORDER — HEPARIN 100 UNIT/ML
500 SYRINGE INTRAVENOUS
Status: CANCELLED | OUTPATIENT
Start: 2018-07-02

## 2018-07-02 RX ORDER — SODIUM CHLORIDE 0.9 % (FLUSH) 0.9 %
10 SYRINGE (ML) INJECTION
Status: CANCELLED | OUTPATIENT
Start: 2018-07-02

## 2018-07-02 RX ORDER — SODIUM CHLORIDE 0.9 % (FLUSH) 0.9 %
10 SYRINGE (ML) INJECTION
Status: DISCONTINUED | OUTPATIENT
Start: 2018-07-02 | End: 2018-07-02 | Stop reason: HOSPADM

## 2018-07-02 RX ORDER — EPINEPHRINE 0.3 MG/.3ML
0.3 INJECTION SUBCUTANEOUS ONCE AS NEEDED
Status: CANCELLED | OUTPATIENT
Start: 2018-07-02 | End: 2018-07-02

## 2018-07-02 RX ORDER — HEPARIN 100 UNIT/ML
500 SYRINGE INTRAVENOUS
Status: DISCONTINUED | OUTPATIENT
Start: 2018-07-02 | End: 2018-07-02 | Stop reason: HOSPADM

## 2018-07-02 RX ORDER — FAMOTIDINE 10 MG/ML
20 INJECTION INTRAVENOUS
Status: DISCONTINUED | OUTPATIENT
Start: 2018-07-02 | End: 2018-07-02

## 2018-07-02 RX ORDER — FAMOTIDINE 10 MG/ML
20 INJECTION INTRAVENOUS
Status: CANCELLED | OUTPATIENT
Start: 2018-07-02

## 2018-07-02 RX ORDER — FAMOTIDINE 20 MG/50ML
20 INJECTION, SOLUTION INTRAVENOUS
Status: COMPLETED | OUTPATIENT
Start: 2018-07-02 | End: 2018-07-02

## 2018-07-02 RX ORDER — DIPHENHYDRAMINE HYDROCHLORIDE 50 MG/ML
50 INJECTION INTRAMUSCULAR; INTRAVENOUS ONCE AS NEEDED
Status: CANCELLED | OUTPATIENT
Start: 2018-07-02 | End: 2018-07-02

## 2018-07-02 RX ADMIN — SODIUM CHLORIDE: 0.9 INJECTION, SOLUTION INTRAVENOUS at 09:07

## 2018-07-02 RX ADMIN — DIPHENHYDRAMINE HYDROCHLORIDE 50 MG: 50 INJECTION INTRAMUSCULAR; INTRAVENOUS at 09:07

## 2018-07-02 RX ADMIN — FAMOTIDINE 20 MG: 20 INJECTION, SOLUTION INTRAVENOUS at 10:07

## 2018-07-02 RX ADMIN — PACLITAXEL 190 MG: 100 INJECTION, POWDER, LYOPHILIZED, FOR SUSPENSION INTRAVENOUS at 11:07

## 2018-07-02 RX ADMIN — HEPARIN SODIUM (PORCINE) LOCK FLUSH IV SOLN 100 UNIT/ML 500 UNITS: 100 SOLUTION at 12:07

## 2018-07-02 RX ADMIN — SODIUM CHLORIDE, PRESERVATIVE FREE 10 ML: 5 INJECTION INTRAVENOUS at 12:07

## 2018-07-02 RX ADMIN — PALONOSETRON HYDROCHLORIDE: 0.25 INJECTION INTRAVENOUS at 10:07

## 2018-07-06 ENCOUNTER — INFUSION (OUTPATIENT)
Dept: INFUSION THERAPY | Facility: HOSPITAL | Age: 73
End: 2018-07-06
Attending: INTERNAL MEDICINE
Payer: MEDICARE

## 2018-07-06 ENCOUNTER — OFFICE VISIT (OUTPATIENT)
Dept: HEMATOLOGY/ONCOLOGY | Facility: CLINIC | Age: 73
End: 2018-07-06
Payer: MEDICARE

## 2018-07-06 VITALS
BODY MASS INDEX: 29.75 KG/M2 | DIASTOLIC BLOOD PRESSURE: 62 MMHG | TEMPERATURE: 98 F | HEART RATE: 62 BPM | HEIGHT: 63 IN | WEIGHT: 167.88 LBS | OXYGEN SATURATION: 96 % | SYSTOLIC BLOOD PRESSURE: 92 MMHG

## 2018-07-06 DIAGNOSIS — C34.90 LUNG CANCER: Primary | ICD-10-CM

## 2018-07-06 DIAGNOSIS — J44.9 CHRONIC OBSTRUCTIVE PULMONARY DISEASE, UNSPECIFIED COPD TYPE: ICD-10-CM

## 2018-07-06 DIAGNOSIS — C34.91 SQUAMOUS CELL CARCINOMA OF RIGHT LUNG: Primary | Chronic | ICD-10-CM

## 2018-07-06 DIAGNOSIS — Z85.3 HISTORY OF BREAST CANCER: ICD-10-CM

## 2018-07-06 DIAGNOSIS — R03.1 BLOOD PRESSURE DECREASED: ICD-10-CM

## 2018-07-06 DIAGNOSIS — Z09 CHEMOTHERAPY FOLLOW-UP EXAMINATION: ICD-10-CM

## 2018-07-06 PROCEDURE — 99214 OFFICE O/P EST MOD 30 MIN: CPT | Mod: S$GLB,,, | Performed by: INTERNAL MEDICINE

## 2018-07-06 PROCEDURE — 25000003 PHARM REV CODE 250: Performed by: INTERNAL MEDICINE

## 2018-07-06 PROCEDURE — A4216 STERILE WATER/SALINE, 10 ML: HCPCS | Performed by: INTERNAL MEDICINE

## 2018-07-06 PROCEDURE — 99999 PR PBB SHADOW E&M-EST. PATIENT-LVL IV: CPT | Mod: PBBFAC,,, | Performed by: INTERNAL MEDICINE

## 2018-07-06 PROCEDURE — 99499 UNLISTED E&M SERVICE: CPT | Mod: HCNC,S$GLB,, | Performed by: INTERNAL MEDICINE

## 2018-07-06 PROCEDURE — 96360 HYDRATION IV INFUSION INIT: CPT

## 2018-07-06 PROCEDURE — 63600175 PHARM REV CODE 636 W HCPCS: Performed by: INTERNAL MEDICINE

## 2018-07-06 RX ORDER — FAMOTIDINE 10 MG/ML
20 INJECTION INTRAVENOUS
Status: CANCELLED | OUTPATIENT
Start: 2018-07-09

## 2018-07-06 RX ORDER — DIPHENHYDRAMINE HYDROCHLORIDE 50 MG/ML
50 INJECTION INTRAMUSCULAR; INTRAVENOUS ONCE AS NEEDED
Status: CANCELLED | OUTPATIENT
Start: 2018-07-09 | End: 2018-07-09

## 2018-07-06 RX ORDER — SODIUM CHLORIDE 0.9 % (FLUSH) 0.9 %
10 SYRINGE (ML) INJECTION
Status: CANCELLED | OUTPATIENT
Start: 2018-07-09

## 2018-07-06 RX ORDER — SODIUM CHLORIDE 0.9 % (FLUSH) 0.9 %
10 SYRINGE (ML) INJECTION
Status: DISCONTINUED | OUTPATIENT
Start: 2018-07-06 | End: 2018-07-06 | Stop reason: HOSPADM

## 2018-07-06 RX ORDER — EPINEPHRINE 0.3 MG/.3ML
0.3 INJECTION SUBCUTANEOUS ONCE AS NEEDED
Status: CANCELLED | OUTPATIENT
Start: 2018-07-09 | End: 2018-07-09

## 2018-07-06 RX ORDER — HEPARIN 100 UNIT/ML
500 SYRINGE INTRAVENOUS
Status: COMPLETED | OUTPATIENT
Start: 2018-07-06 | End: 2018-07-06

## 2018-07-06 RX ORDER — HEPARIN 100 UNIT/ML
500 SYRINGE INTRAVENOUS
Status: CANCELLED | OUTPATIENT
Start: 2018-07-09

## 2018-07-06 RX ADMIN — HEPARIN 500 UNITS: 100 SYRINGE at 10:07

## 2018-07-06 RX ADMIN — SODIUM CHLORIDE, PRESERVATIVE FREE 10 ML: 5 INJECTION INTRAVENOUS at 10:07

## 2018-07-06 RX ADMIN — SODIUM CHLORIDE 1000 ML: 0.9 INJECTION, SOLUTION INTRAVENOUS at 09:07

## 2018-07-06 NOTE — PROGRESS NOTES
Subjective:       Patient ID: Ade Catsellano is a 72 y.o. female.    Chief Complaint: Follow-up    HPI   Diagnosis: Stage IV cancer Squamous Cell CA lung     HPI 71 y/o female seen today for 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     She has  History of Stage 1A breast cancer Grade 3, ER/MA neg , Her 2 jose maria neg s/p lumpectomy 7/11/2014 and s/p adjuvant RT 9/2014 Pt declined Adjuvant chemo.    Previously lost to follow-up        Pt hospitalized 11/2017   Ms. Castellano was admitted for acute on chronic respiratory failure requiring intubation and ventilation. Has large lung mass in right lung with probable post obstructive pneumonia resulting in COPD exacerbation. But also, patient had ran out of home O2 prior to onset of symptoms, likely contributing to presentation. Initiated on broad spectrum abx, IV steroids, duo-nebs and pulmonology consulted for ventilator co-management. Successfully extubated to BiPAP on 11/30. Then de-escalated to low flow nasal cannula. Abx tailored to augmentin for broad coverage, including anaerobic bacteria /     CTA chest 11/29/2017 reveals   Large right hilar mass with involvement of adjacent segmental pulmonary arterial branches and bronchi with associated postobstructive atelectasis and volume loss in the right middle lobe with adjacent ground glass opacity.  Findings highly concerning for underlying neoplastic process. Mediastinal and axillary adenopathy, with a right axillary lymph node measuring up to 2.0 cm in short axis diameter. No definite evidence of pulmonary thromboembolism.    She is followed by Pulm  She underwent rt axillary LN bx at outside facility- on 1/16/2018 [facility]  Pathology revealed metastatic poorly differentiated carcinoma of unknown primary site  TTF-1 negative, napsin negative  , cytokeratin 7 positive, cytokeratin 20 negative, p63 negative, cytokeratin 5/6 focal dim positivity    She next underwent lung bx at [facility] l1/31/2017   Pathology  revealed benign lung tissue    She underwent repeat Right Lung Bx 2/14/2018 Pathology reveals Squamous cell carcinoma  PD-L1 10% low expression  EGFR NEG  ALK NEG  BRAF pending  Outside slides axillary LN specimen  requested for review/comparison to lung bx findings     Pt hospitalized  6/12/2018 with severe anemia with Hb 4.9g/dl  as well as neutropenia with white blood cell count of 910, .    She underwent 3 units prbc with improvement in Hb to 12.3g/dl and discharged next day     She Is undergoing cycle 6 of carboplatin/Abraxane    PET/CT  4/19/2018 reveals there has been a excellent response to therapy.  At least 90% reduction in malignant activity.    Today, she reports dizziness past week  Appetite diminished   She continues with occasional cough  Mild LOGAN-chronic,stable   She reports intermittent numbness in feet  No N/V  No hemoptysis   Mild arthralgias- chronic,stable  No bleeding-urinary/rectal/nasal            PrevHx:She had an abnormal mammogram 6/4/2014 which  Revealed a round solid mass 6mm in rt breast.  She then underwent U/S guided core bx of rt breast mass on 6/17/2014.  Pathology revealed infiltrating ductal carcinoma Grade 3 with tumor  Present in thin-walled spaces suggestive of lymphatic spaces. Hormone  Receptor status on tumor specimen revealed ER negative 0% ,  IA negative 0% and Her 2 jose maria negative.  She subsequently underwent rt segmental mastectomy and SLN bx  On 7/11/2014. Pathology of rt breast lumpectomy revealed invasive  Ductal carcinoma with micropapillary pattern ( Invasive micropapillary  Ca) with max tumor dimension 11.5mm with suggestion of tumor in   Thin walled spaces c/w lymphovascular involvement. Surgical  Margins free of tumor, grade 3, ER/IA neg , Her 2 jose maria neg   With [place] Lymph node negative for Neoplasm.   Pathologic staging [id number](i-)  Her mother was diagnosed with breast cancer in her 50's/  She has no family history of ovarian cancer.           Review of  "Systems   Constitutional: Negative for appetite change, fatigue, fever and unexpected weight change.   HENT: Negative for mouth sores and rhinorrhea.    Eyes: Negative for visual disturbance.   Respiratory: Positive for cough (improved).    Cardiovascular: Negative for chest pain.   Gastrointestinal: Negative for abdominal pain and diarrhea.   Genitourinary: Negative for frequency.   Musculoskeletal: Positive for arthralgias and back pain.   Skin: Negative for rash.   Neurological: Positive for dizziness. Negative for headaches.   Hematological: Negative for adenopathy.   Psychiatric/Behavioral: The patient is not nervous/anxious.        Objective:        Vitals:    07/06/18 0814 07/06/18 0816   BP: (!) 95/58 92/62   BP Location: Left arm Left arm   Patient Position: Sitting Sitting   BP Method: Medium (Automatic) Medium (Manual)   Pulse: 62    Temp: 98.2 °F (36.8 °C)    TempSrc: Oral    SpO2: 96%    Weight: 76.1 kg (167 lb 14.1 oz)    Height: 5' 3" (1.6 m)          Physical Exam   Constitutional: She is oriented to person, place, and time. She appears well-developed and well-nourished.   HENT:   Head: Normocephalic.   Mouth/Throat: Oropharynx is clear and moist. No oropharyngeal exudate.   Eyes: Conjunctivae and lids are normal. Pupils are equal, round, and reactive to light. No scleral icterus.   Neck: Normal range of motion. Neck supple. No thyromegaly present.   Cardiovascular: Normal rate, regular rhythm and normal heart sounds.    No murmur heard.  Pulmonary/Chest: She has no wheezes. Right breast exhibits no inverted nipple, no mass, no nipple discharge and no skin change. Left breast exhibits no inverted nipple, no mass, no nipple discharge and no skin change.   Bibasilar crackles R>L   Abdominal: Soft. Bowel sounds are normal. She exhibits no distension and no mass. There is no hepatosplenomegaly. There is no tenderness. There is no rebound and no guarding.   Musculoskeletal: Normal range of motion. She " exhibits no edema or tenderness.   Lymphadenopathy:     She has no cervical adenopathy.     She has no axillary adenopathy.        Right: No supraclavicular adenopathy present.        Left: No supraclavicular adenopathy present.   Neurological: She is alert and oriented to person, place, and time. No cranial nerve deficit. Coordination normal.   Skin: Skin is warm and dry. No ecchymosis, no petechiae and no rash noted. No erythema.   Psychiatric: She has a normal mood and affect.             CT a/p w/contrast 11/29/2018   1. No acute abnormality identified within the abdomen and pelvis.    2.  There are a few nonspecific prominent lymph nodes in the upper abdomen including a periesophageal lymph node which measures 1.0 cm in short axis diameter.    3.  Significant abdominal aortic atherosclerosis and abdominal aorta ectasia.    4.  Additional findings as above.    PET/CT 1/26/2018   1.  Intense FDG uptake within the known right infrahilar lung mass, compatible with malignancy.  It is unclear if this represents primary lung malignancy or metastatic breast cancer.    2.  Intensely hypermetabolic right axillary, retropectoral, and lower right cervical lymph nodes, compatible with metastases.    3.  Abnormal FDG uptake along right breast skin thickening, new from prior chest CTA and mammogram.  This may relate to localized edema/inflammation, though correlation with physical exam and mammography are recommended to exclude underlying inflammatory carcinoma.      MRI Brain w w/out contrast 2/9/2018  1.  No evidence of intracranial metastases.  2.  Sinus disease       Rt axillary LN bx at outside facility- on 1/16/2018 at [facility]  Pathology revealed metastatic poorly differentiated carcinoma of unknown primary  site 1/16/2018 at [facility]  TTF-1 negative, napsin negative  , cytokeratin 7 positive, cytokeratin 20 negative, p63 negative, cytokeratin 5/6 focal dim positivity    LUNG BIOPSY  1/31/2017   Pathology revealed benign lung tissue         SPECIMEN  1) Right Lung Bx 2/14/2018  Supplemental Diagnosis  Immunohistochemical stains show strong nuclear staining for p63 in essentially all tumor cells and very strong  cytoplasmic and membrane staining for CK5/6, also in essentially all tumor cells. TTF-1 and CK7 are negative  within tumor cells but do stain native pulmonary elements present within the biopsy. A stain for mucicarmine is  negative. Positive and negative controls function appropriately.  Final diagnosis: Specimen submitted as right lung biopsy  -Squamous cell carcinoma  (Electronically Signed: 2018-02-20 09:12:07 )  Diagnosed by: Phong Thompson  FINAL PATHOLOGIC DIAGNOSIS  Fragments of pulmonary parenchyma (submitted as right lung biopsy):    FINAL PATHOLOGIC DIAGNOSIS 1. Lymph node, right axilla, biopsy, review of 10 outside slides [facility], JS [phone],  collected January 11, 2018: Metastatic poorly differentiated carcinoma (see comment).  The histologic section shows fibrous stroma infiltrated by nest of poorly differentiated malignant cells including  occasional dyskeratotic cells morphologically suggestive of metastatic squamous cell carcinoma.  Immunohistochemical stains are nonspecific; the cells are positive for cytokeratin 7 and cytokeratin 5/6 (focal) and  negative for cytokeratin 20, TTF-1, p63, GCDFP, mammoglobin, and CEA        PET/CT 4/19/2018 There has been a excellent response to therapy.  At least 90% reduction in malignant activity    Assessment:       1. Squamous cell carcinoma of right lung    2. Blood pressure decreased    3. History of breast cancer    4. Chronic obstructive pulmonary disease, unspecified COPD type    5. Chemotherapy follow-up examination        Plan:   ECOG 1  1-5 Pt with widely metastatic squamous cell CA lung     Pt with hx of Stage 1A invasive ductal carcinoma rt breast s/p rt segmental mastectomy and SLN bx 7/11/2014 ER/LA  neg HER 2 jose maria neg [id number](i-).  S/p adjuvant RT completed 9/2014 Pt declined adjuvant chemo  Follow-up Mammo 6/12/2017 No mammographic evidence of malignancy.  Pt  hospitalized 11/2017 with acute-on-chronic  resp failure  Abnormal CT imaging revealing Large right hilar mass with involvement of  adjacent segmental pulmonary arterial branches and bronchi with associated postobstructive atelectasis  and involvement of mediastinal and axillary adenopathy   S/p rt axillary LN bx ( outside facility) - metastatic poorly differentiated carcinoma of unknown primary  site  status post recent lung biopsy-benign lung tissue  PET/CT 1/26/2018   Intense FDG uptake within the known right infrahilar lung mass, compatible with malignancy.   Intensely hypermetabolic right axillary, retropectoral, and lower right cervical lymph nodes, compatible with metastases.  Abnormal FDG uptake along right breast skin thickening, new from prior chest CTA and mammogram.    Repeat lung bx reveals Squamous Cell CA lung  PD-L1 10% low expression  EGFR NEG  ALK NEG  BRAF pending    Proceed with carboplatin/abraxane cycle 6 Day 15 monday  ( 6 of 6 planned)   Planned follow-up imaging studies upon completion of cycle 6 to assess disease status    IVF hydration today in infusion ctr( decreased BP, Cr 1. 2)    Cbc,cmp prior to weekly chemo     Follow-up  3 weeks with cbc,cmp prior to f/u       Cc: Swetha Katz M.D.         MD Tory Roberson MD

## 2018-07-06 NOTE — PLAN OF CARE
Problem: Patient Care Overview  Goal: Plan of Care Review  Outcome: Ongoing (interventions implemented as appropriate)  Patient arrived to unit after 3 week follow-up at MD office. Patient's SBP 90s DBP 50s in clinic. Patient c/o abdominal pain and fatigue. Denies dizziness, lightheadedness, SOB, or any other symptoms. Patient received 1L NS. Tolerated well. /64 prior to discharge. Received discharge instructions and verbalized understanding.

## 2018-07-09 ENCOUNTER — TELEPHONE (OUTPATIENT)
Dept: HEMATOLOGY/ONCOLOGY | Facility: CLINIC | Age: 73
End: 2018-07-09

## 2018-07-13 ENCOUNTER — DOCUMENTATION ONLY (OUTPATIENT)
Dept: INFUSION THERAPY | Facility: HOSPITAL | Age: 73
End: 2018-07-13

## 2018-07-13 NOTE — PROGRESS NOTES
"7/11/13. Pt informed ANC is 600 and chemo can not be given. Pt stated to infusion staff she is "done with chemo" and doesn't want the last chemo. Time and travel is too expensive. Pt left and message sent to Dr. Holliday.  "

## 2018-07-17 ENCOUNTER — LAB VISIT (OUTPATIENT)
Dept: LAB | Facility: HOSPITAL | Age: 73
End: 2018-07-17
Attending: INTERNAL MEDICINE
Payer: MEDICARE

## 2018-07-17 ENCOUNTER — DOCUMENTATION ONLY (OUTPATIENT)
Dept: INFUSION THERAPY | Facility: HOSPITAL | Age: 73
End: 2018-07-17

## 2018-07-17 DIAGNOSIS — C34.90 MALIGNANT NEOPLASM OF LUNG, UNSPECIFIED LATERALITY, UNSPECIFIED PART OF LUNG: ICD-10-CM

## 2018-07-17 LAB
ALBUMIN SERPL BCP-MCNC: 3.7 G/DL
ALP SERPL-CCNC: 35 U/L
ALT SERPL W/O P-5'-P-CCNC: 13 U/L
ANION GAP SERPL CALC-SCNC: 7 MMOL/L
AST SERPL-CCNC: 16 U/L
BASOPHILS # BLD AUTO: 0.07 K/UL
BASOPHILS NFR BLD: 2.8 %
BILIRUB SERPL-MCNC: 0.6 MG/DL
BUN SERPL-MCNC: 13 MG/DL
CALCIUM SERPL-MCNC: 9.1 MG/DL
CHLORIDE SERPL-SCNC: 106 MMOL/L
CO2 SERPL-SCNC: 27 MMOL/L
CREAT SERPL-MCNC: 0.8 MG/DL
DIFFERENTIAL METHOD: ABNORMAL
EOSINOPHIL # BLD AUTO: 0 K/UL
EOSINOPHIL NFR BLD: 1.2 %
ERYTHROCYTE [DISTWIDTH] IN BLOOD BY AUTOMATED COUNT: 19.9 %
EST. GFR  (AFRICAN AMERICAN): >60 ML/MIN/1.73 M^2
EST. GFR  (NON AFRICAN AMERICAN): >60 ML/MIN/1.73 M^2
GLUCOSE SERPL-MCNC: 140 MG/DL
HCT VFR BLD AUTO: 29.7 %
HGB BLD-MCNC: 10 G/DL
LYMPHOCYTES # BLD AUTO: 1.1 K/UL
LYMPHOCYTES NFR BLD: 42.9 %
MCH RBC QN AUTO: 32.3 PG
MCHC RBC AUTO-ENTMCNC: 33.7 G/DL
MCV RBC AUTO: 96 FL
MONOCYTES # BLD AUTO: 0.5 K/UL
MONOCYTES NFR BLD: 21.5 %
NEUTROPHILS # BLD AUTO: 0.8 K/UL
NEUTROPHILS NFR BLD: 35.6 %
PLATELET # BLD AUTO: 120 K/UL
PLATELET BLD QL SMEAR: ABNORMAL
PMV BLD AUTO: 9.8 FL
POTASSIUM SERPL-SCNC: 4.6 MMOL/L
PROT SERPL-MCNC: 6.2 G/DL
RBC # BLD AUTO: 3.1 M/UL
SODIUM SERPL-SCNC: 140 MMOL/L
WBC # BLD AUTO: 2.47 K/UL

## 2018-07-17 PROCEDURE — 80053 COMPREHEN METABOLIC PANEL: CPT

## 2018-07-17 PROCEDURE — 85025 COMPLETE CBC W/AUTO DIFF WBC: CPT

## 2018-07-17 PROCEDURE — 36415 COLL VENOUS BLD VENIPUNCTURE: CPT

## 2018-07-19 ENCOUNTER — HOSPITAL ENCOUNTER (OUTPATIENT)
Dept: RADIOLOGY | Facility: HOSPITAL | Age: 73
Discharge: HOME | End: 2018-07-19
Attending: INTERNAL MEDICINE
Payer: MEDICARE

## 2018-07-19 DIAGNOSIS — C34.91 SQUAMOUS CELL CARCINOMA OF RIGHT LUNG: Chronic | ICD-10-CM

## 2018-07-19 PROCEDURE — 78815 PET IMAGE W/CT SKULL-THIGH: CPT | Mod: 26,PS,, | Performed by: RADIOLOGY

## 2018-07-19 PROCEDURE — A9552 F18 FDG: HCPCS

## 2018-07-19 PROCEDURE — 78815 PET IMAGE W/CT SKULL-THIGH: CPT | Mod: TC

## 2018-07-24 ENCOUNTER — TELEPHONE (OUTPATIENT)
Dept: HEMATOLOGY/ONCOLOGY | Facility: CLINIC | Age: 73
End: 2018-07-24

## 2018-07-24 ENCOUNTER — OFFICE VISIT (OUTPATIENT)
Dept: HEMATOLOGY/ONCOLOGY | Facility: CLINIC | Age: 73
End: 2018-07-24
Payer: MEDICARE

## 2018-07-24 VITALS
BODY MASS INDEX: 29.69 KG/M2 | SYSTOLIC BLOOD PRESSURE: 126 MMHG | HEART RATE: 67 BPM | HEIGHT: 63 IN | TEMPERATURE: 98 F | WEIGHT: 167.56 LBS | OXYGEN SATURATION: 96 % | DIASTOLIC BLOOD PRESSURE: 78 MMHG

## 2018-07-24 DIAGNOSIS — C34.90 SQUAMOUS CELL CARCINOMA OF LUNG, UNSPECIFIED LATERALITY: Primary | Chronic | ICD-10-CM

## 2018-07-24 DIAGNOSIS — R05.9 COUGH: ICD-10-CM

## 2018-07-24 DIAGNOSIS — Z92.21 HISTORY OF CHEMOTHERAPY: ICD-10-CM

## 2018-07-24 DIAGNOSIS — C34.91 CARCINOMA, LUNG, RIGHT: Primary | ICD-10-CM

## 2018-07-24 DIAGNOSIS — J44.9 CHRONIC OBSTRUCTIVE PULMONARY DISEASE, UNSPECIFIED COPD TYPE: Chronic | ICD-10-CM

## 2018-07-24 PROCEDURE — 99999 PR PBB SHADOW E&M-EST. PATIENT-LVL IV: CPT | Mod: PBBFAC,,, | Performed by: INTERNAL MEDICINE

## 2018-07-24 PROCEDURE — 80053 COMPREHEN METABOLIC PANEL: CPT

## 2018-07-24 PROCEDURE — 99214 OFFICE O/P EST MOD 30 MIN: CPT | Mod: S$GLB,,, | Performed by: INTERNAL MEDICINE

## 2018-07-24 PROCEDURE — 99499 UNLISTED E&M SERVICE: CPT | Mod: HCNC,S$GLB,, | Performed by: INTERNAL MEDICINE

## 2018-07-24 NOTE — TELEPHONE ENCOUNTER
----- Message from Mildred Holliday MD sent at 7/24/2018  1:20 PM CDT -----  Notify pt Hb level OK

## 2018-07-24 NOTE — PROGRESS NOTES
Subjective:       Patient ID: Ade Castellano is a 72 y.o. female.    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    Previously lost to follow-up        Pt hospitalized 11/2017   Ms. Castellano was admitted for acute on chronic respiratory failure requiring intubation and ventilation. Has large lung mass in right lung with probable post obstructive pneumonia resulting in COPD exacerbation. But also, patient had ran out of home O2 prior to onset of symptoms, likely contributing to presentation. Initiated on broad spectrum abx, IV steroids, duo-nebs and pulmonology consulted for ventilator co-management. Successfully extubated to BiPAP on 11/30. Then de-escalated to low flow nasal cannula. Abx tailored to augmentin for broad coverage, including anaerobic bacteria /     CTA chest 11/29/2017 reveals   Large right hilar mass with involvement of adjacent segmental pulmonary arterial branches and bronchi with associated postobstructive atelectasis and volume loss in the right middle lobe with adjacent ground glass opacity.  Findings highly concerning for underlying neoplastic process. Mediastinal and axillary adenopathy, with a right axillary lymph node measuring up to 2.0 cm in short axis diameter. No definite evidence of pulmonary thromboembolism.    She is followed by Pulm  She underwent rt axillary LN bx at outside facility- on 1/16/2018 at [facility]  Pathology revealed metastatic poorly differentiated carcinoma of unknown primary site  TTF-1 negative, napsin negative  , cytokeratin 7 positive, cytokeratin 20 negative, p63 negative, cytokeratin 5/6 focal dim positivity    She next underwent lung bx at [facility] l1/31/2017   Pathology  revealed benign lung tissue    She underwent repeat Right Lung Bx 2/14/2018 Pathology reveals Squamous cell carcinoma  PD-L1 10% low expression  EGFR NEG  ALK NEG  BRAF pending  Outside slides axillary LN specimen  requested for review/comparison to lung bx findings     Pt hospitalized  6/12/2018 with severe anemia with Hb 4.9g/dl  as well as neutropenia with white blood cell count of 910, .    She underwent 3 units prbc with improvement in Hb to 12.3g/dl and discharged next day     She Is undergoing cycle 6 of carboplatin/Abraxane    PET/CT  4/19/2018 reveals there has been a excellent response to therapy.  At least 90% reduction in malignant activity.    Today, she is doing well  She is nervous/anxious today   No longer has dizziness  Appetite and weight stable   She continues with occasional cough  Mild LOGAN-chronic,stable   She continues with prn oxygen- mostly at night   No hemoptysis   Mild arthralgias- chronic,stable  No bleeding-urinary/rectal/nasal            PrevHx:She had an abnormal mammogram 6/4/2014 which  Revealed a round solid mass 6mm in rt breast.  She then underwent U/S guided core bx of rt breast mass on 6/17/2014.  Pathology revealed infiltrating ductal carcinoma Grade 3 with tumor  Present in thin-walled spaces suggestive of lymphatic spaces. Hormone  Receptor status on tumor specimen revealed ER negative 0% ,  NH negative 0% and Her 2 jose maria negative.  She subsequently underwent rt segmental mastectomy and SLN bx  On 7/11/2014. Pathology of rt breast lumpectomy revealed invasive  Ductal carcinoma with micropapillary pattern ( Invasive micropapillary  Ca) with max tumor dimension 11.5mm with suggestion of tumor in   Thin walled spaces c/w lymphovascular involvement. Surgical  Margins free of tumor, grade 3, ER/NH neg , Her 2 jose maria neg   With [place] Lymph node negative for Neoplasm.   Pathologic staging [id number](i-)  Her mother was diagnosed with breast cancer in her 50's/  She has no family  "history of ovarian cancer.           Review of Systems   Constitutional: Negative for appetite change, fatigue, fever and unexpected weight change.   HENT: Negative for mouth sores and rhinorrhea.    Eyes: Negative for visual disturbance.   Respiratory: Positive for cough (improved).    Cardiovascular: Negative for chest pain.   Gastrointestinal: Negative for abdominal pain and diarrhea.   Genitourinary: Negative for frequency.   Musculoskeletal: Positive for arthralgias and back pain.   Skin: Negative for rash.   Neurological: Negative for dizziness and headaches.   Hematological: Negative for adenopathy.   Psychiatric/Behavioral: The patient is nervous/anxious.        Objective:        Vitals:    07/24/18 1003   BP: 126/78   BP Location: Left arm   Patient Position: Sitting   BP Method: Large (Automatic)   Pulse: 67   Temp: 98.2 °F (36.8 °C)   TempSrc: Oral   SpO2: 96%   Weight: 76 kg (167 lb 8.8 oz)   Height: 5' 3" (1.6 m)         Physical Exam   Constitutional: She is oriented to person, place, and time. She appears well-developed and well-nourished.   HENT:   Head: Normocephalic.   Mouth/Throat: Oropharynx is clear and moist. No oropharyngeal exudate.   Eyes: Conjunctivae and lids are normal. Pupils are equal, round, and reactive to light. No scleral icterus.   Neck: Normal range of motion. Neck supple. No thyromegaly present.   Cardiovascular: Normal rate, regular rhythm and normal heart sounds.    No murmur heard.  Pulmonary/Chest: Effort normal and breath sounds normal. She has no wheezes. Right breast exhibits no inverted nipple, no mass, no nipple discharge and no skin change. Left breast exhibits no inverted nipple, no mass, no nipple discharge and no skin change.   Abdominal: Soft. Bowel sounds are normal. She exhibits mass. There is no hepatosplenomegaly. There is no tenderness. There is no rebound and no guarding.   Musculoskeletal: Normal range of motion. She exhibits no edema or tenderness. "   Lymphadenopathy:     She has no cervical adenopathy.     She has no axillary adenopathy.        Right: No supraclavicular adenopathy present.        Left: No supraclavicular adenopathy present.   Neurological: She is alert and oriented to person, place, and time. No cranial nerve deficit. Coordination normal.   Skin: Skin is warm and dry. No ecchymosis, no petechiae and no rash noted. No erythema.   Psychiatric: She has a normal mood and affect.             CT a/p w/contrast 11/29/2018   1. No acute abnormality identified within the abdomen and pelvis.    2.  There are a few nonspecific prominent lymph nodes in the upper abdomen including a periesophageal lymph node which measures 1.0 cm in short axis diameter.    3.  Significant abdominal aortic atherosclerosis and abdominal aorta ectasia.    4.  Additional findings as above.    PET/CT 1/26/2018   1.  Intense FDG uptake within the known right infrahilar lung mass, compatible with malignancy.  It is unclear if this represents primary lung malignancy or metastatic breast cancer.    2.  Intensely hypermetabolic right axillary, retropectoral, and lower right cervical lymph nodes, compatible with metastases.    3.  Abnormal FDG uptake along right breast skin thickening, new from prior chest CTA and mammogram.  This may relate to localized edema/inflammation, though correlation with physical exam and mammography are recommended to exclude underlying inflammatory carcinoma.      MRI Brain w w/out contrast 2/9/2018  1.  No evidence of intracranial metastases.  2.  Sinus disease       Rt axillary LN bx at outside facility- on 1/16/2018 at [facility]  Pathology revealed metastatic poorly differentiated carcinoma of unknown primary  site 1/16/2018 at [facility]  TTF-1 negative, napsin negative  , cytokeratin 7 positive, cytokeratin 20 negative, p63 negative, cytokeratin 5/6 focal dim positivity    LUNG BIOPSY 1/31/2017   Pathology revealed benign  lung tissue         SPECIMEN  1) Right Lung Bx 2/14/2018  Supplemental Diagnosis  Immunohistochemical stains show strong nuclear staining for p63 in essentially all tumor cells and very strong  cytoplasmic and membrane staining for CK5/6, also in essentially all tumor cells. TTF-1 and CK7 are negative  within tumor cells but do stain native pulmonary elements present within the biopsy. A stain for mucicarmine is  negative. Positive and negative controls function appropriately.  Final diagnosis: Specimen submitted as right lung biopsy  -Squamous cell carcinoma  (Electronically Signed: 2018-02-20 09:12:07 )  Diagnosed by: Phong Thompson  FINAL PATHOLOGIC DIAGNOSIS  Fragments of pulmonary parenchyma (submitted as right lung biopsy):    FINAL PATHOLOGIC DIAGNOSIS 1. Lymph node, right axilla, biopsy, review of 10 outside slides [facility], JS [id number] [id number],  collected January 11, 2018: Metastatic poorly differentiated carcinoma (see comment).  The histologic section shows fibrous stroma infiltrated by nest of poorly differentiated malignant cells including  occasional dyskeratotic cells morphologically suggestive of metastatic squamous cell carcinoma.  Immunohistochemical stains are nonspecific; the cells are positive for cytokeratin 7 and cytokeratin 5/6 (focal) and  negative for cytokeratin 20, TTF-1, p63, GCDFP, mammoglobin, and CEA    PET/CT 4/19/2018 There has been a excellent response to therapy.  At least 90% reduction in malignant activity    PET/CT 7/18/2019   No abnormal foci of increased tracer uptake are visualized to suggest residual/recurrent or metastatic disease.    New consolidative/atelectatic opacity within the right middle lobe without any corresponding increased hypermetabolic activity  Assessment:       1. Squamous cell carcinoma of lung, unspecified laterality    2. History of chemotherapy    3. Cough    4. Chronic obstructive pulmonary disease, unspecified COPD type        Plan:    ECOG 1  1-4 Pt with widely metastatic squamous cell CA lung     Pt with hx of Stage 1A invasive ductal carcinoma rt breast s/p rt segmental mastectomy and SLN bx 7/11/2014 ER/VA neg HER 2 jose maria neg [id number](i-).  S/p adjuvant RT completed 9/2014 Pt declined adjuvant chemo  Follow-up Mammo 6/12/2017 No mammographic evidence of malignancy.  Pt  hospitalized 11/2017 with acute-on-chronic  resp failure  Abnormal CT imaging revealing Large right hilar mass with involvement of  adjacent segmental pulmonary arterial branches and bronchi with associated postobstructive atelectasis  and involvement of mediastinal and axillary adenopathy   S/p rt axillary LN bx ( outside facility) - metastatic poorly differentiated carcinoma of unknown primary  site  status post recent lung biopsy-benign lung tissue  PET/CT 1/26/2018   Intense FDG uptake within the known right infrahilar lung mass, compatible with malignancy.   Intensely hypermetabolic right axillary, retropectoral, and lower right cervical lymph nodes, compatible with metastases.  Abnormal FDG uptake along right breast skin thickening, new from prior chest CTA and mammogram.    Repeat lung bx reveals Squamous Cell CA lung  PD-L1 10% low expression  EGFR NEG  ALK NEG  BRAF pending    Hold Day 15 ( prolonged cytopenias)   Pt has completed planned chemo    Discussed radiographic findings in detail with patient which revealed no evidence of disease recurrence  Plan to discuss lung findings with her treating Pulmonologist  Dr. Katz  Pt's cough is stable   Pt will bring PET imaging to her treating pulmonologist      Follow-up  1 mo with cbc,cmp prior to f/u         Cc: Swetha Katz M.D.         MD Tory Roberson MD

## 2018-08-23 ENCOUNTER — INFUSION (OUTPATIENT)
Dept: INFUSION THERAPY | Facility: HOSPITAL | Age: 73
End: 2018-08-23
Attending: INTERNAL MEDICINE
Payer: MEDICARE

## 2018-08-23 ENCOUNTER — OFFICE VISIT (OUTPATIENT)
Dept: HEMATOLOGY/ONCOLOGY | Facility: CLINIC | Age: 73
End: 2018-08-23
Payer: MEDICARE

## 2018-08-23 VITALS
SYSTOLIC BLOOD PRESSURE: 114 MMHG | HEIGHT: 62 IN | OXYGEN SATURATION: 96 % | WEIGHT: 171.31 LBS | HEART RATE: 53 BPM | DIASTOLIC BLOOD PRESSURE: 72 MMHG | BODY MASS INDEX: 31.52 KG/M2 | TEMPERATURE: 98 F

## 2018-08-23 DIAGNOSIS — Z85.3 HISTORY OF BREAST CANCER: Chronic | ICD-10-CM

## 2018-08-23 DIAGNOSIS — J44.9 CHRONIC OBSTRUCTIVE PULMONARY DISEASE, UNSPECIFIED COPD TYPE: Chronic | ICD-10-CM

## 2018-08-23 DIAGNOSIS — C34.90 SQUAMOUS CELL CARCINOMA OF LUNG, UNSPECIFIED LATERALITY: Primary | Chronic | ICD-10-CM

## 2018-08-23 DIAGNOSIS — G62.0 CHEMOTHERAPY-INDUCED NEUROPATHY: ICD-10-CM

## 2018-08-23 DIAGNOSIS — T45.1X5A CHEMOTHERAPY-INDUCED NEUROPATHY: ICD-10-CM

## 2018-08-23 DIAGNOSIS — C34.90 SQUAMOUS CELL CARCINOMA LUNG: Primary | ICD-10-CM

## 2018-08-23 DIAGNOSIS — R06.09 DOE (DYSPNEA ON EXERTION): ICD-10-CM

## 2018-08-23 DIAGNOSIS — Z92.21 HISTORY OF CHEMOTHERAPY: ICD-10-CM

## 2018-08-23 PROCEDURE — 99999 PR PBB SHADOW E&M-EST. PATIENT-LVL IV: CPT | Mod: PBBFAC,,, | Performed by: INTERNAL MEDICINE

## 2018-08-23 PROCEDURE — A4216 STERILE WATER/SALINE, 10 ML: HCPCS | Performed by: INTERNAL MEDICINE

## 2018-08-23 PROCEDURE — 96523 IRRIG DRUG DELIVERY DEVICE: CPT

## 2018-08-23 PROCEDURE — 99499 UNLISTED E&M SERVICE: CPT | Mod: HCNC,S$GLB,, | Performed by: INTERNAL MEDICINE

## 2018-08-23 PROCEDURE — 63600175 PHARM REV CODE 636 W HCPCS: Performed by: INTERNAL MEDICINE

## 2018-08-23 PROCEDURE — 25000003 PHARM REV CODE 250: Performed by: INTERNAL MEDICINE

## 2018-08-23 PROCEDURE — 99214 OFFICE O/P EST MOD 30 MIN: CPT | Mod: S$GLB,,, | Performed by: INTERNAL MEDICINE

## 2018-08-23 RX ORDER — SODIUM CHLORIDE 0.9 % (FLUSH) 0.9 %
10 SYRINGE (ML) INJECTION
Status: CANCELLED | OUTPATIENT
Start: 2018-08-23

## 2018-08-23 RX ORDER — SODIUM CHLORIDE 0.9 % (FLUSH) 0.9 %
10 SYRINGE (ML) INJECTION
Status: DISCONTINUED | OUTPATIENT
Start: 2018-08-23 | End: 2018-08-23 | Stop reason: HOSPADM

## 2018-08-23 RX ORDER — HEPARIN 100 UNIT/ML
500 SYRINGE INTRAVENOUS
Status: DISCONTINUED | OUTPATIENT
Start: 2018-08-23 | End: 2018-08-23 | Stop reason: HOSPADM

## 2018-08-23 RX ORDER — HEPARIN 100 UNIT/ML
500 SYRINGE INTRAVENOUS
Status: CANCELLED | OUTPATIENT
Start: 2018-08-23

## 2018-08-23 RX ADMIN — Medication 10 ML: at 08:08

## 2018-08-23 RX ADMIN — HEPARIN 500 UNITS: 100 SYRINGE at 08:08

## 2018-08-23 NOTE — PROGRESS NOTES
Subjective:       Patient ID: Ade Castellano is a 72 y.o. female.    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    Previously lost to follow-up        Pt hospitalized 11/2017   Ms. Castellano was admitted for acute on chronic respiratory failure requiring intubation and ventilation. Has large lung mass in right lung with probable post obstructive pneumonia resulting in COPD exacerbation. But also, patient had ran out of home O2 prior to onset of symptoms, likely contributing to presentation. Initiated on broad spectrum abx, IV steroids, duo-nebs and pulmonology consulted for ventilator co-management. Successfully extubated to BiPAP on 11/30. Then de-escalated to low flow nasal cannula. Abx tailored to augmentin for broad coverage, including anaerobic bacteria /     CTA chest 11/29/2017 reveals   Large right hilar mass with involvement of adjacent segmental pulmonary arterial branches and bronchi with associated postobstructive atelectasis and volume loss in the right middle lobe with adjacent ground glass opacity.  Findings highly concerning for underlying neoplastic process. Mediastinal and axillary adenopathy, with a right axillary lymph node measuring up to 2.0 cm in short axis diameter. No definite evidence of pulmonary thromboembolism.    She is followed by Pulm  She underwent rt axillary LN bx at outside facility- on 1/16/2018 at [facility]  Pathology revealed metastatic poorly differentiated carcinoma of unknown primary site  TTF-1 negative, napsin negative  , cytokeratin 7 positive, cytokeratin 20 negative, p63 negative, cytokeratin 5/6 focal dim positivity    She next underwent lung bx at [facility] l1/31/2017   Pathology  revealed benign lung tissue    She underwent repeat Right Lung Bx 2/14/2018 Pathology reveals Squamous cell carcinoma  PD-L1 10% low expression  EGFR NEG  ALK NEG  BRAF pending  Outside slides axillary LN specimen  requested for review/comparison to lung bx findings     Pt hospitalized  6/12/2018 with severe anemia with Hb 4.9g/dl  as well as neutropenia with white blood cell count of 910, .    She underwent 3 units prbc with improvement in Hb to 12.3g/dl and discharged next day     She s/p  cycle 6 of carboplatin/Abraxane    PET/CT  4/19/2018 reveals there has been a excellent response to therapy.  At least 90% reduction in malignant activity.    Today, she reports LOGAN -stable  She is nervous/anxious today   Appetite and weight stable   No cough   Mild fatigue   She continues with prn oxygen- mostly at night   No hemoptysis   Mild arthralgias- chronic,stable  No bleeding-urinary/rectal/nasal            PrevHx:She had an abnormal mammogram 6/4/2014 which  Revealed a round solid mass 6mm in rt breast.  She then underwent U/S guided core bx of rt breast mass on 6/17/2014.  Pathology revealed infiltrating ductal carcinoma Grade 3 with tumor  Present in thin-walled spaces suggestive of lymphatic spaces. Hormone  Receptor status on tumor specimen revealed ER negative 0% ,  SC negative 0% and Her 2 jose maria negative.  She subsequently underwent rt segmental mastectomy and SLN bx  On 7/11/2014. Pathology of rt breast lumpectomy revealed invasive  Ductal carcinoma with micropapillary pattern ( Invasive micropapillary  Ca) with max tumor dimension 11.5mm with suggestion of tumor in   Thin walled spaces c/w lymphovascular involvement. Surgical  Margins free of tumor, grade 3, ER/SC neg , Her 2 jose maria neg   With [place] Lymph node negative for Neoplasm.   Pathologic staging [id number](i-)  Her mother was diagnosed with breast cancer in her 50's/  She has no family history of ovarian cancer.           Review of Systems  "  Constitutional: Negative for appetite change, fatigue, fever and unexpected weight change.   HENT: Negative for mouth sores and rhinorrhea.    Eyes: Negative for visual disturbance.   Respiratory: Positive for shortness of breath (LOGAN). Negative for cough.    Cardiovascular: Negative for chest pain.   Gastrointestinal: Negative for abdominal pain and diarrhea.   Genitourinary: Negative for frequency.   Musculoskeletal: Positive for arthralgias and back pain.   Skin: Negative for rash.   Neurological: Negative for dizziness and headaches.   Hematological: Negative for adenopathy.   Psychiatric/Behavioral: The patient is nervous/anxious.        Objective:        Vitals:    08/23/18 0809 08/23/18 0831   BP: 114/72    BP Location: Left arm    Patient Position: Sitting    BP Method: Medium (Manual)    Pulse: (!) 51 (!) 53   Temp: 97.7 °F (36.5 °C)    TempSrc: Oral    SpO2: 95% 96%   Weight: 77.7 kg (171 lb 4.8 oz)    Height: 5' 2" (1.575 m)          Physical Exam   Constitutional: She is oriented to person, place, and time. She appears well-developed and well-nourished.   HENT:   Head: Normocephalic.   Mouth/Throat: Oropharynx is clear and moist. No oropharyngeal exudate.   Eyes: Conjunctivae and lids are normal. Pupils are equal, round, and reactive to light. No scleral icterus.   Neck: Normal range of motion. Neck supple. No thyromegaly present.   Cardiovascular: Normal rate, regular rhythm and normal heart sounds.   No murmur heard.  Pulmonary/Chest: Effort normal and breath sounds normal. She has no wheezes. Right breast exhibits no inverted nipple, no mass, no nipple discharge and no skin change. Left breast exhibits no inverted nipple, no mass, no nipple discharge and no skin change.   Abdominal: Soft. Bowel sounds are normal. She exhibits mass. There is no hepatosplenomegaly. There is no tenderness. There is no rebound and no guarding.   Musculoskeletal: Normal range of motion. She exhibits no edema or " tenderness.   Lymphadenopathy:     She has no cervical adenopathy.     She has no axillary adenopathy.        Right: No supraclavicular adenopathy present.        Left: No supraclavicular adenopathy present.   Neurological: She is alert and oriented to person, place, and time. No cranial nerve deficit. Coordination normal.   Skin: Skin is warm and dry. No ecchymosis, no petechiae and no rash noted. No erythema.   Psychiatric: She has a normal mood and affect.             CT a/p w/contrast 11/29/2018   1. No acute abnormality identified within the abdomen and pelvis.    2.  There are a few nonspecific prominent lymph nodes in the upper abdomen including a periesophageal lymph node which measures 1.0 cm in short axis diameter.    3.  Significant abdominal aortic atherosclerosis and abdominal aorta ectasia.    4.  Additional findings as above.    PET/CT 1/26/2018   1.  Intense FDG uptake within the known right infrahilar lung mass, compatible with malignancy.  It is unclear if this represents primary lung malignancy or metastatic breast cancer.    2.  Intensely hypermetabolic right axillary, retropectoral, and lower right cervical lymph nodes, compatible with metastases.    3.  Abnormal FDG uptake along right breast skin thickening, new from prior chest CTA and mammogram.  This may relate to localized edema/inflammation, though correlation with physical exam and mammography are recommended to exclude underlying inflammatory carcinoma.      MRI Brain w w/out contrast 2/9/2018  1.  No evidence of intracranial metastases.  2.  Sinus disease       Rt axillary LN bx at outside facility- on 1/16/2018 at [facility]  Pathology revealed metastatic poorly differentiated carcinoma of unknown primary  site 1/16/2018 at [facility]  TTF-1 negative, napsin negative  , cytokeratin 7 positive, cytokeratin 20 negative, p63 negative, cytokeratin 5/6 focal dim positivity    LUNG BIOPSY 1/31/2017   Pathology  revealed benign lung tissue         SPECIMEN  1) Right Lung Bx 2/14/2018  Supplemental Diagnosis  Immunohistochemical stains show strong nuclear staining for p63 in essentially all tumor cells and very strong  cytoplasmic and membrane staining for CK5/6, also in essentially all tumor cells. TTF-1 and CK7 are negative  within tumor cells but do stain native pulmonary elements present within the biopsy. A stain for mucicarmine is  negative. Positive and negative controls function appropriately.  Final diagnosis: Specimen submitted as right lung biopsy  -Squamous cell carcinoma  (Electronically Signed: 2018-02-20 09:12:07 )  Diagnosed by: Phong Thompson  FINAL PATHOLOGIC DIAGNOSIS  Fragments of pulmonary parenchyma (submitted as right lung biopsy):    FINAL PATHOLOGIC DIAGNOSIS 1. Lymph node, right axilla, biopsy, review of 10 outside slides [facility], JS [id number] [id number],  collected January 11, 2018: Metastatic poorly differentiated carcinoma (see comment).  The histologic section shows fibrous stroma infiltrated by nest of poorly differentiated malignant cells including  occasional dyskeratotic cells morphologically suggestive of metastatic squamous cell carcinoma.  Immunohistochemical stains are nonspecific; the cells are positive for cytokeratin 7 and cytokeratin 5/6 (focal) and  negative for cytokeratin 20, TTF-1, p63, GCDFP, mammoglobin, and CEA    PET/CT 4/19/2018 There has been a excellent response to therapy.  At least 90% reduction in malignant activity    PET/CT 7/18/2018   No abnormal foci of increased tracer uptake are visualized to suggest residual/recurrent or metastatic disease.    New consolidative/atelectatic opacity within the right middle lobe without any corresponding increased hypermetabolic activity  Assessment:       1. Squamous cell carcinoma of lung, unspecified laterality    2. History of chemotherapy    3. History of breast cancer    4. Chronic obstructive pulmonary disease,  unspecified COPD type    5. Chemotherapy-induced neuropathy    6. LOGAN (dyspnea on exertion)        Plan:   ECOG 1  1-6 Pt with widely metastatic squamous cell CA lung     Pt with hx of Stage 1A invasive ductal carcinoma rt breast s/p rt segmental mastectomy and SLN bx 7/11/2014 ER/MS neg HER 2 jose maria neg [id number](i-).  S/p adjuvant RT completed 9/2014 Pt declined adjuvant chemo  Follow-up Mammo 6/12/2017 No mammographic evidence of malignancy.  Pt  hospitalized 11/2017 with acute-on-chronic  resp failure  Abnormal CT imaging revealing Large right hilar mass with involvement of  adjacent segmental pulmonary arterial branches and bronchi with associated postobstructive atelectasis  and involvement of mediastinal and axillary adenopathy   S/p rt axillary LN bx ( outside facility) - metastatic poorly differentiated carcinoma of unknown primary  site  status post recent lung biopsy-benign lung tissue  PET/CT 1/26/2018   Intense FDG uptake within the known right infrahilar lung mass, compatible with malignancy.   Intensely hypermetabolic right axillary, retropectoral, and lower right cervical lymph nodes, compatible with metastases.  Abnormal FDG uptake along right breast skin thickening, new from prior chest CTA and mammogram.    Repeat lung bx reveals Squamous Cell CA lung  PD-L1 10% low expression  EGFR NEG  ALK NEG      She s/p  cycle 6 of carboplatin/Abraxane Day1 completed 7/2/2018 ( day 15 held due to prolonged cytopenias)  Discussed radiographic findings in detail with patient   PET/CT 7/18/2018  Reveals no abnormal foci of increased tracer uptake are visualized to suggest residual/recurrent or metastatic disease.  New consolidative/atelectatic opacity within the right middle lobe without any corresponding increased hypermetabolic activity  Plan to discuss findings ( in comparison to previous) with radiology   Plan follow-up PET/CT prior to f/u   Follow-up with Pulmonary   Follow-up  Oct  mo with cbc,cmp prior to f/u      >25 minutes spent during this visit of which greater than 50% devoted to counseling and coordination of care regarding diagnosis and management plan.    Cc: Swetha Katz M.D.         MD Tory Roberson MD

## 2018-08-23 NOTE — PLAN OF CARE
Problem: Patient Care Overview  Goal: Plan of Care Review  Outcome: Ongoing (interventions implemented as appropriate)  Pt admitted @ 8:40am for RT CW flush , tolerated well. AVS given to Pt and Pt discharged @ 8:50am. Ambulated off unit unassisted.

## 2018-09-14 ENCOUNTER — OFFICE VISIT (OUTPATIENT)
Dept: CARDIOLOGY | Facility: CLINIC | Age: 73
End: 2018-09-14
Payer: MEDICARE

## 2018-09-14 VITALS
RESPIRATION RATE: 20 BRPM | DIASTOLIC BLOOD PRESSURE: 68 MMHG | BODY MASS INDEX: 30.65 KG/M2 | SYSTOLIC BLOOD PRESSURE: 102 MMHG | WEIGHT: 167.56 LBS | OXYGEN SATURATION: 97 % | HEART RATE: 61 BPM

## 2018-09-14 DIAGNOSIS — I25.10 CORONARY ARTERY DISEASE INVOLVING NATIVE CORONARY ARTERY OF NATIVE HEART WITHOUT ANGINA PECTORIS: Primary | ICD-10-CM

## 2018-09-14 DIAGNOSIS — R73.03 PREDIABETES: ICD-10-CM

## 2018-09-14 DIAGNOSIS — E78.5 HYPERLIPIDEMIA LDL GOAL <70: ICD-10-CM

## 2018-09-14 DIAGNOSIS — C34.90 SQUAMOUS CELL CARCINOMA OF LUNG, UNSPECIFIED LATERALITY: ICD-10-CM

## 2018-09-14 DIAGNOSIS — I11.9 BENIGN HYPERTENSIVE HEART DISEASE WITHOUT HEART FAILURE: ICD-10-CM

## 2018-09-14 DIAGNOSIS — C34.91 SQUAMOUS CELL CARCINOMA OF RIGHT LUNG: ICD-10-CM

## 2018-09-14 DIAGNOSIS — Z91.89 AT RISK FOR CORONARY ARTERY DISEASE: ICD-10-CM

## 2018-09-14 DIAGNOSIS — J43.1 PANLOBULAR EMPHYSEMA: ICD-10-CM

## 2018-09-14 PROCEDURE — 99204 OFFICE O/P NEW MOD 45 MIN: CPT | Mod: S$PBB,,, | Performed by: INTERNAL MEDICINE

## 2018-09-14 PROCEDURE — 99999 PR PBB SHADOW E&M-EST. PATIENT-LVL III: CPT | Mod: PBBFAC,,, | Performed by: INTERNAL MEDICINE

## 2018-09-14 PROCEDURE — 99499 UNLISTED E&M SERVICE: CPT | Mod: HCNC,S$GLB,, | Performed by: INTERNAL MEDICINE

## 2018-09-14 PROCEDURE — 1101F PT FALLS ASSESS-DOCD LE1/YR: CPT | Mod: CPTII,,, | Performed by: INTERNAL MEDICINE

## 2018-09-14 PROCEDURE — 99213 OFFICE O/P EST LOW 20 MIN: CPT | Mod: PBBFAC | Performed by: INTERNAL MEDICINE

## 2018-09-14 NOTE — PROGRESS NOTES
Patient, Ade Castellano (MRN #[id number]), presented with a recent Estimated PA Systolic Pressure greater than 40 mmHG consistent with the definition of pulmonary hypertension (ICD10 - I27.0).    Est. PA Systolic Pressure   Date Value Ref Range Status   11/29/2017 54.44 (A)       The patient's pulmonary hypertension was monitored, evaluated, addressed and/or treated. This addendum to the medical record is made on 09/14/2018.

## 2018-09-14 NOTE — PROGRESS NOTES
Subjective:    Patient ID:  Ade Castellano is a 72 y.o. female who presents for evaluation of Establish Care (adelia from [facility] )      HPI  Patient is here to establish care with history of coronary artery disease.  She has previously followed by the [facility].  She is changing due to insurance purposes.  She says she was never told about her her conditions by Dr. Ortiz.  She says she is somewhat of a poor historian recollection of details and currently we do not have any medical records.  She denies any chest pain but does get shortness of breath on heavy activity associated with some substernal heaviness.  She denies any sustained tachycardia or palpitations.  She has experienced no PND, orthopnea or lower extremity edema.  She has undergone chemotherapy and says currently she is cancer free.  She denies any dizziness to the point of presyncope or syncope.  She is not very active and does not exercise due to the multiple comorbidities currently.    Review of Systems   Constitution: Negative.   HENT: Negative.    Eyes: Negative.    Cardiovascular: Positive for dyspnea on exertion. Negative for chest pain, irregular heartbeat, leg swelling, near-syncope, orthopnea, palpitations, paroxysmal nocturnal dyspnea and syncope.   Respiratory: Positive for shortness of breath.    Skin: Negative.    Musculoskeletal: Negative.    Gastrointestinal: Negative for abdominal pain, constipation and diarrhea.   Genitourinary: Negative for dysuria.   Neurological: Negative.    Psychiatric/Behavioral: Negative.      Past Medical History:   Diagnosis Date    Acute respiratory failure with hypoxia and hypercapnia 11/29/2017    Angina pectoris     Arthritis     Bell's palsy     left facial weakness    Breast cancer     RIGHT    CAD (coronary artery disease)     Cervical cancer     Chronic bronchitis     COPD (chronic obstructive pulmonary disease)     Dr. Katz    Dental bridge present     Emphysema of lung     H/O  colonoscopy 06/2017    due for repeat colonsocopy in 6/2018    History of heart artery stent     Dr. Ortiz  x2 stents    Hyperlipidemia     Hypertension     Lung cancer     Myocardial infarction     SUNITHA (obstructive sleep apnea)     intolerant to mask    Pneumonia     Pneumonia due to other staphylococcus     PUD (peptic ulcer disease)     Severe anemia 6/11/2018    Sleep apnea     Vaginal delivery     x1     Past Surgical History:   Procedure Laterality Date    ADENOIDECTOMY      [id number]-RF N/A 2/14/2018    Performed by [facility] OR    BIOPSY-SENTINEL NODE (98243) Right 7/11/2014    Performed by [facility] at [facility] OR    BIOPSY-ULTRASOUND GUIDED CORE Right 6/17/2014    [facility] OR    BREAST BIOPSY Right     x3    BREAST SURGERY      lumpectomy right side     CERVIX SURGERY      cone    COLONOSCOPY N/A 3/17/2017    Procedure: COLONOSCOPY;  Surgeon: Julio Rudd MD;  Location: [facility] ENDO;  Service: Endoscopy;  Laterality: N/A;    COLONOSCOPY N/A 6/30/2017    Procedure: COLONOSCOPY;  Surgeon: Julio Rudd MD;  Location: [facility] ENDO;  Service: Endoscopy;  Laterality: N/A;    COLONOSCOPY N/A 6/30/2017    [facility] ENDO    COLONOSCOPY N/A 3/17/2017    [facility] ENDO    ESOPHAGOGASTRODUODENOSCOPY (EGD) N/A 10/11/2017    [facility] at [facility] ENDO    EYE SURGERY Left 06/08/2018    STSCDESZJ-[id number]-L-CATH N/A 3/7/2018    [facility]    LUMPECTOMY RIGHT BREAST S/P NEEDLE LOCALIZATION  Right 7/11/2014    [facility] OR    PORTACATH PLACEMENT Right 01/2018    sweat glands axillary regions      TONSILLECTOMY       Social History     Tobacco Use    Smoking status: Former Smoker     Packs/day: 0.25     Years: 50.00     Pack years: 12.50     Types: Cigarettes     Last attempt to quit: 8/23/2009      Years since quittin.0    Smokeless tobacco: Never Used   Substance Use Topics    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     Drug use: No     Family History   Problem Relation Age of Onset    Cancer Mother         breast    Heart disease Mother     Breast cancer Mother     Cancer Father         lung-smoker     Cancer Sister         lung-smoker     Heart attack Sister     Cancer Maternal Grandmother     Heart disease Maternal Grandmother     Cancer Maternal Grandfather     Heart disease Maternal Grandfather     Cancer Paternal Grandmother     Cancer Paternal Grandfather     Cancer Sister         mets not sure where it started         Objective:    Physical Exam   Constitutional: She is oriented to person, place, and time. She appears well-developed and well-nourished.   HENT:   Head: Normocephalic and atraumatic.   Eyes: Conjunctivae and EOM are normal. Pupils are equal, round, and reactive to light.   Neck: Normal range of motion. Neck supple. No thyromegaly present.   Cardiovascular: Normal rate and regular rhythm.   No murmur heard.  Pulmonary/Chest: No respiratory distress.   Decreased air movement bilaterally   Abdominal: Soft. Bowel sounds are normal.   Musculoskeletal: She exhibits no edema.   Neurological: She is alert and oriented to person, place, and time.   Skin: Skin is warm and dry.   Psychiatric: She has a normal mood and affect. Her behavior is normal.       ekg normal sinus rhythm    Assessment:       1. Coronary artery disease involving native coronary artery of native heart without angina pectoris    2. Benign hypertensive heart disease without heart failure    3. Hyperlipidemia LDL goal <70    4. Panlobular emphysema    5. Squamous cell carcinoma of right lung    6. Prediabetes    7. Squamous cell carcinoma of lung, unspecified laterality    8. At risk for coronary artery disease         Plan:       -plan for baseline testing including echo, nuclear stress  * unable to walk  with multiple comorbidities  -continue follow up with Oncology  -PAD screen  -get records from the [facility]    Return to clinic in 1 month with testing now

## 2018-09-17 RX ORDER — METOPROLOL TARTRATE 25 MG/1
25 TABLET, FILM COATED ORAL 2 TIMES DAILY
Qty: 180 TABLET | Refills: 2 | Status: SHIPPED | OUTPATIENT
Start: 2018-09-17 | End: 2018-09-19 | Stop reason: SDUPTHER

## 2018-09-17 NOTE — TELEPHONE ENCOUNTER
----- Message from Krista Fuentes sent at 9/17/2018 12:59 PM CDT -----  Contact: Ade [phone]  REFILL: metoprolol tartrate (LOPRESSOR) 25 MG tablet    PHARMACY: [facility] [id number] [id number]

## 2018-09-20 RX ORDER — METOPROLOL TARTRATE 25 MG/1
25 TABLET, FILM COATED ORAL 2 TIMES DAILY
Qty: 180 TABLET | Refills: 2 | Status: SHIPPED | OUTPATIENT
Start: 2018-09-20 | End: 2019-11-21 | Stop reason: SDUPTHER

## 2018-09-28 ENCOUNTER — HOSPITAL ENCOUNTER (OUTPATIENT)
Dept: RADIOLOGY | Facility: HOSPITAL | Age: 73
Discharge: HOME | End: 2018-09-28
Attending: INTERNAL MEDICINE
Payer: MEDICARE

## 2018-09-28 ENCOUNTER — INFUSION (OUTPATIENT)
Dept: INFUSION THERAPY | Facility: HOSPITAL | Age: 73
End: 2018-09-28
Attending: INTERNAL MEDICINE
Payer: MEDICARE

## 2018-09-28 ENCOUNTER — LAB VISIT (OUTPATIENT)
Dept: LAB | Facility: HOSPITAL | Age: 73
End: 2018-09-28
Attending: INTERNAL MEDICINE
Payer: MEDICARE

## 2018-09-28 DIAGNOSIS — C34.90 SQUAMOUS CELL CARCINOMA OF LUNG, UNSPECIFIED LATERALITY: Chronic | ICD-10-CM

## 2018-09-28 DIAGNOSIS — C34.90 SQUAMOUS CELL CARCINOMA LUNG: Primary | ICD-10-CM

## 2018-09-28 DIAGNOSIS — R06.09 DOE (DYSPNEA ON EXERTION): ICD-10-CM

## 2018-09-28 LAB
ALBUMIN SERPL BCP-MCNC: 4.2 G/DL
ALP SERPL-CCNC: 41 U/L
ALT SERPL W/O P-5'-P-CCNC: 14 U/L
ANION GAP SERPL CALC-SCNC: 10 MMOL/L
AST SERPL-CCNC: 18 U/L
BASOPHILS # BLD AUTO: 0.04 K/UL
BASOPHILS NFR BLD: 1 %
BILIRUB SERPL-MCNC: 0.4 MG/DL
BUN SERPL-MCNC: 16 MG/DL
CALCIUM SERPL-MCNC: 10.3 MG/DL
CHLORIDE SERPL-SCNC: 101 MMOL/L
CO2 SERPL-SCNC: 28 MMOL/L
CREAT SERPL-MCNC: 0.8 MG/DL
DIFFERENTIAL METHOD: ABNORMAL
EOSINOPHIL # BLD AUTO: 0.1 K/UL
EOSINOPHIL NFR BLD: 3.1 %
ERYTHROCYTE [DISTWIDTH] IN BLOOD BY AUTOMATED COUNT: 12.8 %
EST. GFR  (AFRICAN AMERICAN): >60 ML/MIN/1.73 M^2
EST. GFR  (NON AFRICAN AMERICAN): >60 ML/MIN/1.73 M^2
GLUCOSE SERPL-MCNC: 114 MG/DL
HCT VFR BLD AUTO: 41.7 %
HGB BLD-MCNC: 13.9 G/DL
LYMPHOCYTES # BLD AUTO: 1.5 K/UL
LYMPHOCYTES NFR BLD: 34.7 %
MCH RBC QN AUTO: 32.5 PG
MCHC RBC AUTO-ENTMCNC: 33.3 G/DL
MCV RBC AUTO: 97 FL
MONOCYTES # BLD AUTO: 0.4 K/UL
MONOCYTES NFR BLD: 10 %
NEUTROPHILS # BLD AUTO: 2.1 K/UL
NEUTROPHILS NFR BLD: 51.2 %
PLATELET # BLD AUTO: 202 K/UL
PMV BLD AUTO: 9.7 FL
POTASSIUM SERPL-SCNC: 3.7 MMOL/L
PROT SERPL-MCNC: 7.1 G/DL
RBC # BLD AUTO: 4.28 M/UL
SODIUM SERPL-SCNC: 139 MMOL/L
WBC # BLD AUTO: 4.18 K/UL

## 2018-09-28 PROCEDURE — 63600175 PHARM REV CODE 636 W HCPCS: Performed by: INTERNAL MEDICINE

## 2018-09-28 PROCEDURE — 85025 COMPLETE CBC W/AUTO DIFF WBC: CPT

## 2018-09-28 PROCEDURE — 36415 COLL VENOUS BLD VENIPUNCTURE: CPT

## 2018-09-28 PROCEDURE — 25000003 PHARM REV CODE 250: Performed by: INTERNAL MEDICINE

## 2018-09-28 PROCEDURE — A9552 F18 FDG: HCPCS

## 2018-09-28 PROCEDURE — A4216 STERILE WATER/SALINE, 10 ML: HCPCS | Performed by: INTERNAL MEDICINE

## 2018-09-28 PROCEDURE — 96523 IRRIG DRUG DELIVERY DEVICE: CPT

## 2018-09-28 PROCEDURE — 78815 PET IMAGE W/CT SKULL-THIGH: CPT | Mod: 26,PS,, | Performed by: RADIOLOGY

## 2018-09-28 PROCEDURE — 80053 COMPREHEN METABOLIC PANEL: CPT

## 2018-09-28 PROCEDURE — 78815 PET IMAGE W/CT SKULL-THIGH: CPT | Mod: TC

## 2018-09-28 RX ORDER — SODIUM CHLORIDE 0.9 % (FLUSH) 0.9 %
10 SYRINGE (ML) INJECTION
Status: DISCONTINUED | OUTPATIENT
Start: 2018-09-28 | End: 2018-09-28 | Stop reason: HOSPADM

## 2018-09-28 RX ORDER — SODIUM CHLORIDE 0.9 % (FLUSH) 0.9 %
10 SYRINGE (ML) INJECTION
Status: CANCELLED | OUTPATIENT
Start: 2018-09-28

## 2018-09-28 RX ORDER — HEPARIN 100 UNIT/ML
500 SYRINGE INTRAVENOUS
Status: DISCONTINUED | OUTPATIENT
Start: 2018-09-28 | End: 2018-09-28 | Stop reason: HOSPADM

## 2018-09-28 RX ORDER — HEPARIN 100 UNIT/ML
500 SYRINGE INTRAVENOUS
Status: CANCELLED | OUTPATIENT
Start: 2018-09-28

## 2018-09-28 RX ADMIN — HEPARIN 500 UNITS: 100 SYRINGE at 09:09

## 2018-09-28 RX ADMIN — Medication 10 ML: at 09:09

## 2018-10-04 ENCOUNTER — OFFICE VISIT (OUTPATIENT)
Dept: HEMATOLOGY/ONCOLOGY | Facility: CLINIC | Age: 73
End: 2018-10-04
Payer: MEDICARE

## 2018-10-04 VITALS
HEIGHT: 64 IN | HEART RATE: 54 BPM | BODY MASS INDEX: 28.63 KG/M2 | WEIGHT: 167.69 LBS | DIASTOLIC BLOOD PRESSURE: 69 MMHG | SYSTOLIC BLOOD PRESSURE: 108 MMHG | OXYGEN SATURATION: 94 % | TEMPERATURE: 98 F

## 2018-10-04 DIAGNOSIS — Z85.3 HISTORY OF BREAST CANCER: Chronic | ICD-10-CM

## 2018-10-04 DIAGNOSIS — C34.90 SQUAMOUS CELL CARCINOMA OF LUNG, UNSPECIFIED LATERALITY: Primary | Chronic | ICD-10-CM

## 2018-10-04 DIAGNOSIS — Z92.21 HISTORY OF CHEMOTHERAPY: ICD-10-CM

## 2018-10-04 DIAGNOSIS — J44.9 CHRONIC OBSTRUCTIVE PULMONARY DISEASE, UNSPECIFIED COPD TYPE: Chronic | ICD-10-CM

## 2018-10-04 PROCEDURE — 99999 PR PBB SHADOW E&M-EST. PATIENT-LVL III: CPT | Mod: PBBFAC,,, | Performed by: INTERNAL MEDICINE

## 2018-10-04 PROCEDURE — 99499 UNLISTED E&M SERVICE: CPT | Mod: HCNC,S$GLB,, | Performed by: INTERNAL MEDICINE

## 2018-10-04 PROCEDURE — 99214 OFFICE O/P EST MOD 30 MIN: CPT | Mod: S$PBB,,, | Performed by: INTERNAL MEDICINE

## 2018-10-04 PROCEDURE — 99213 OFFICE O/P EST LOW 20 MIN: CPT | Mod: PBBFAC | Performed by: INTERNAL MEDICINE

## 2018-10-04 PROCEDURE — 1101F PT FALLS ASSESS-DOCD LE1/YR: CPT | Mod: CPTII,,, | Performed by: INTERNAL MEDICINE

## 2018-10-04 NOTE — Clinical Note
Labs prior to f/uPt needs CD of Recent PET- please assist to brin to dr. ALBERTO recent PET/CT report to DR. Katz

## 2018-10-04 NOTE — PROGRESS NOTES
Subjective:       Patient ID: Ade Castellano is a 72 y.o. female.    Chief Complaint: Follow-up    HPI   Diagnosis: Stage IV cancer Squamous Cell CA lung     HPI 71 y/o female seen today for 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    s/p  cycle 6 of carboplatin/Abraxane 7/2/2018   She has  History of Stage 1A breast cancer Grade 3, ER/VA neg , Her 2 jose maria neg s/p lumpectomy 7/11/2014 and s/p adjuvant RT 9/2014 Pt declined Adjuvant chemo.       Pt hospitalized 11/2017   Ms. Castellano was admitted for acute on chronic respiratory failure requiring intubation and ventilation. Has large lung mass in right lung with probable post obstructive pneumonia resulting in COPD exacerbation. But also, patient had ran out of home O2 prior to onset of symptoms, likely contributing to presentation. Initiated on broad spectrum abx, IV steroids, duo-nebs and pulmonology consulted for ventilator co-management. Successfully extubated to BiPAP on 11/30. Then de-escalated to low flow nasal cannula. Abx tailored to augmentin for broad coverage, including anaerobic bacteria /     CTA chest 11/29/2017 reveals   Large right hilar mass with involvement of adjacent segmental pulmonary arterial branches and bronchi with associated postobstructive atelectasis and volume loss in the right middle lobe with adjacent ground glass opacity.  Findings highly concerning for underlying neoplastic process. Mediastinal and axillary adenopathy, with a right axillary lymph node measuring up to 2.0 cm in short axis diameter. No definite evidence of pulmonary thromboembolism.    She is followed by Pulm  She underwent rt axillary LN bx at outside facility- on 1/16/2018 [facility]  Pathology revealed metastatic poorly differentiated carcinoma of unknown primary site  TTF-1 negative, napsin negative  , cytokeratin 7 positive, cytokeratin 20 negative, p63 negative, cytokeratin 5/6 focal dim positivity    She next underwent lung bx at [facility] l1/31/2017    Pathology revealed benign lung tissue    She underwent repeat Right Lung Bx 2/14/2018 Pathology reveals Squamous cell carcinoma  PD-L1 10% low expression  EGFR NEG  ALK NEG  BRAF pending  Outside slides axillary LN specimen  requested for review/comparison to lung bx findings     Pt hospitalized  6/12/2018 with severe anemia with Hb 4.9g/dl  as well as neutropenia with white blood cell count of 910, .    She underwent 3 units prbc with improvement in Hb to 12.3g/dl and discharged next day     She s/p  cycle 6 of carboplatin/Abraxane    PET/CT  4/19/2018 reveals there has been a excellent response to therapy.  At least 90% reduction in malignant activity.    Today, she reports mild LOGAN -stable  She wears O2 only when at home and at night   Appetite and weight stable   She continues with occasional cough productive with clear sputum   Less fatigued  No hemoptysis   Mild arthralgias- chronic,stable  No bleeding-urinary/rectal/nasal      PET/CT 9/28/2018  No definite evidence of recurrent neoplasm.  The new right axillary lymph node is likely benign/reactive and would be unlikely to be metastatic disease.  Left lung abnormality and right lung abnormalities are most likely post radiation or inflammatory change.        PrevHx:She had an abnormal mammogram 6/4/2014 which  Revealed a round solid mass 6mm in rt breast.  She then underwent U/S guided core bx of rt breast mass on 6/17/2014.  Pathology revealed infiltrating ductal carcinoma Grade 3 with tumor  Present in thin-walled spaces suggestive of lymphatic spaces. Hormone  Receptor status on tumor specimen revealed ER negative 0% ,  OR negative 0% and Her 2 jose maria negative.  She subsequently underwent rt segmental mastectomy and SLN bx  On 7/11/2014. Pathology of rt breast lumpectomy revealed invasive  Ductal carcinoma with micropapillary pattern ( Invasive micropapillary  Ca) with max tumor dimension 11.5mm with suggestion of tumor in   Thin walled spaces c/w  "lymphovascular involvement. Surgical  Margins free of tumor, grade 3, ER/[place] neg , Her 2 jose maria neg   With [place] Lymph node negative for Neoplasm.   Pathologic staging [id number](i-)  Her mother was diagnosed with breast cancer in her 50's/  She has no family history of ovarian cancer.           Review of Systems   Constitutional: Negative for appetite change, fatigue, fever and unexpected weight change.   HENT: Negative for mouth sores and rhinorrhea.    Eyes: Negative for visual disturbance.   Respiratory: Positive for cough and shortness of breath (LOGAN).    Cardiovascular: Negative for chest pain.   Gastrointestinal: Negative for abdominal pain and diarrhea.   Genitourinary: Negative for frequency.   Musculoskeletal: Positive for arthralgias and back pain.   Skin: Negative for rash.   Neurological: Negative for dizziness and headaches.   Hematological: Negative for adenopathy.   Psychiatric/Behavioral: The patient is nervous/anxious.        Objective:        Vitals:    10/04/18 0808   BP: 108/69   BP Location: Left arm   Patient Position: Sitting   BP Method: Medium (Automatic)   Pulse: (!) 54   Temp: 97.7 °F (36.5 °C)   TempSrc: Oral   SpO2: (!) 94%   Weight: 76.1 kg (167 lb 10.6 oz)   Height: 5' 3.5" (1.613 m)         Physical Exam   Constitutional: She is oriented to person, place, and time. She appears well-developed and well-nourished.   HENT:   Head: Normocephalic.   Mouth/Throat: Oropharynx is clear and moist. No oropharyngeal exudate.   Eyes: Conjunctivae and lids are normal. Pupils are equal, round, and reactive to light. No scleral icterus.   Neck: Normal range of motion. Neck supple. No thyromegaly present.   Cardiovascular: Normal rate, regular rhythm and normal heart sounds.   No murmur heard.  Pulmonary/Chest: Effort normal and breath sounds normal. She has no wheezes. Right breast exhibits no inverted nipple, no mass, no nipple discharge and no skin change. Left breast exhibits no inverted nipple, no " mass, no nipple discharge and no skin change.   Abdominal: Soft. Bowel sounds are normal. She exhibits mass. There is no hepatosplenomegaly. There is no tenderness. There is no rebound and no guarding.   Musculoskeletal: Normal range of motion. She exhibits no edema or tenderness.   Lymphadenopathy:     She has no cervical adenopathy.     She has no axillary adenopathy.        Right: No supraclavicular adenopathy present.        Left: No supraclavicular adenopathy present.   Neurological: She is alert and oriented to person, place, and time. No cranial nerve deficit. Coordination normal.   Skin: Skin is warm and dry. No ecchymosis, no petechiae and no rash noted. No erythema.   Psychiatric: She has a normal mood and affect.             CT a/p w/contrast 11/29/2018   1. No acute abnormality identified within the abdomen and pelvis.    2.  There are a few nonspecific prominent lymph nodes in the upper abdomen including a periesophageal lymph node which measures 1.0 cm in short axis diameter.    3.  Significant abdominal aortic atherosclerosis and abdominal aorta ectasia.    4.  Additional findings as above.    PET/CT 1/26/2018   1.  Intense FDG uptake within the known right infrahilar lung mass, compatible with malignancy.  It is unclear if this represents primary lung malignancy or metastatic breast cancer.    2.  Intensely hypermetabolic right axillary, retropectoral, and lower right cervical lymph nodes, compatible with metastases.    3.  Abnormal FDG uptake along right breast skin thickening, new from prior chest CTA and mammogram.  This may relate to localized edema/inflammation, though correlation with physical exam and mammography are recommended to exclude underlying inflammatory carcinoma.      MRI Brain w w/out contrast 2/9/2018  1.  No evidence of intracranial metastases.  2.  Sinus disease       Rt axillary LN bx at outside facility- on 1/16/2018 at [facility]  Pathology revealed metastatic  poorly differentiated carcinoma of unknown primary  site 1/16/2018 [facility]  TTF-1 negative, napsin negative  , cytokeratin 7 positive, cytokeratin 20 negative, p63 negative, cytokeratin 5/6 focal dim positivity    LUNG BIOPSY 1/31/2017   Pathology revealed benign lung tissue         SPECIMEN  1) Right Lung Bx 2/14/2018  Supplemental Diagnosis  Immunohistochemical stains show strong nuclear staining for p63 in essentially all tumor cells and very strong  cytoplasmic and membrane staining for CK5/6, also in essentially all tumor cells. TTF-1 and CK7 are negative  within tumor cells but do stain native pulmonary elements present within the biopsy. A stain for mucicarmine is  negative. Positive and negative controls function appropriately.  Final diagnosis: Specimen submitted as right lung biopsy  -Squamous cell carcinoma  (Electronically Signed: 2018-02-20 09:12:07 )  Diagnosed by: Phong Thompson  FINAL PATHOLOGIC DIAGNOSIS  Fragments of pulmonary parenchyma (submitted as right lung biopsy):    FINAL PATHOLOGIC DIAGNOSIS 1. Lymph node, right axilla, biopsy, review of 10 outside slides [facility], JS 18 [phone],  collected January 11, 2018: Metastatic poorly differentiated carcinoma (see comment).  The histologic section shows fibrous stroma infiltrated by nest of poorly differentiated malignant cells including  occasional dyskeratotic cells morphologically suggestive of metastatic squamous cell carcinoma.  Immunohistochemical stains are nonspecific; the cells are positive for cytokeratin 7 and cytokeratin 5/6 (focal) and  negative for cytokeratin 20, TTF-1, p63, GCDFP, mammoglobin, and CEA        PET/CT 9/28/2018  No definite evidence of recurrent neoplasm.  The new right axillary lymph node is likely benign/reactive and would be unlikely to be metastatic disease.    Left lung abnormality and right lung abnormalities are most likely post radiation or inflammatory  change.      Assessment:       1. Squamous cell carcinoma of lung, unspecified laterality    2. History of breast cancer    3. History of chemotherapy    4. Chronic obstructive pulmonary disease, unspecified COPD type        Plan:   ECOG 1  1-4 Pt with widely metastatic squamous cell CA lung     Pt with hx of Stage 1A invasive ductal carcinoma rt breast s/p rt segmental mastectomy and SLN bx 7/11/2014 ER/CO neg HER 2 jose maria neg [id number](i-).  S/p adjuvant RT completed 9/2014 Pt declined adjuvant chemo  Follow-up Mammo 6/12/2017 No mammographic evidence of malignancy.  Pt  hospitalized 11/2017 with acute-on-chronic  resp failure  Abnormal CT imaging revealing Large right hilar mass with involvement of  adjacent segmental pulmonary arterial branches and bronchi with associated postobstructive atelectasis  and involvement of mediastinal and axillary adenopathy   S/p rt axillary LN bx ( outside facility) - metastatic poorly differentiated carcinoma of unknown primary  site  status post recent lung biopsy-benign lung tissue  PET/CT 1/26/2018   Intense FDG uptake within the known right infrahilar lung mass, compatible with malignancy.   Intensely hypermetabolic right axillary, retropectoral, and lower right cervical lymph nodes, compatible with metastases.  Abnormal FDG uptake along right breast skin thickening, new from prior chest CTA and mammogram.    Repeat lung bx reveals Squamous Cell CA lung  PD-L1 10% low expression  EGFR NEG  ALK NEG      She s/p  cycle 6 of carboplatin/Abraxane Day1 completed 7/2/2018 ( day 15 held due to prolonged cytopenias)    Discussed radiographic findings in detail with patient   PET/CT No definite evidence of recurrent neoplasm.  The new right axillary lymph node is likely benign/reactive and would be unlikely to be metastatic disease.  Left lung abnormality and right lung abnormalities are most likely post radiation or inflammatory change.        Follow-up with Pulm end of month    Follow-up    6 weeks  mo with cbc,cmp prior to f/u   Cont PAC flush prn    >25 minutes spent during this visit of which greater than 50% devoted to counseling and coordination of care regarding diagnosis and management plan.    Cc: Swetha Katz M.D.         MD Tory Roberson MD

## 2018-10-05 ENCOUNTER — HOSPITAL ENCOUNTER (OUTPATIENT)
Dept: CARDIOLOGY | Facility: HOSPITAL | Age: 73
Discharge: HOME | End: 2018-10-05
Attending: INTERNAL MEDICINE
Payer: MEDICARE

## 2018-10-05 ENCOUNTER — HOSPITAL ENCOUNTER (OUTPATIENT)
Dept: RADIOLOGY | Facility: HOSPITAL | Age: 73
Discharge: HOME | End: 2018-10-05
Attending: INTERNAL MEDICINE
Payer: MEDICARE

## 2018-10-05 VITALS — BODY MASS INDEX: 29.59 KG/M2 | HEIGHT: 63 IN | WEIGHT: 167 LBS | HEART RATE: 48 BPM

## 2018-10-05 DIAGNOSIS — I25.10 CORONARY ARTERY DISEASE INVOLVING NATIVE CORONARY ARTERY OF NATIVE HEART WITHOUT ANGINA PECTORIS: ICD-10-CM

## 2018-10-05 LAB
AORTIC ROOT ANNULUS: 2.78 CM
AORTIC VALVE CUSP SEPERATION: 1.67 CM
ASCENDING AORTA: 3 CM
AV MEAN GRADIENT: 7.42 MMHG
AV PEAK GRADIENT: 15.65 MMHG
AV VALVE AREA: 1.87 CM2
BSA FOR ECHO PROCEDURE: 1.83 M2
CV ECHO LV RWT: 0.47 CM
CV STRESS BASE HR: 46
DIASTOLIC BLOOD PRESSURE: 53
DOP CALC AO PEAK VEL: 1.98 M/S
DOP CALC AO VTI: 47.98 CM
DOP CALC LVOT AREA: 4.05 CM2
DOP CALC LVOT DIAMETER: 2.27 CM
DOP CALC LVOT STROKE VOLUME: 89.8 CM3
DOP CALCLVOT PEAK VEL VTI: 22.2 CM
E WAVE DECELERATION TIME: 336.41 MSEC
E/A RATIO: 1
E/E' RATIO: 18.8
ECHO LV POSTERIOR WALL: 0.99 CM (ref 0.6–1.1)
FRACTIONAL SHORTENING: 31 % (ref 28–44)
INTERVENTRICULAR SEPTUM: 1 CM (ref 0.6–1.1)
IVRT: 0.14 MSEC
LA MAJOR: 5.27 CM
LA MINOR: 5.32 CM
LA WIDTH: 3.56 CM
LEFT ANT TIBIAL SYS PSV: 49 CM/S
LEFT ARM SYSTOLIC BLOOD PRESSURE: 120 MMHG
LEFT ATRIUM SIZE: 2.99 CM
LEFT ATRIUM VOLUME INDEX: 26.2 ML/M2
LEFT ATRIUM VOLUME: 47.91 CM3
LEFT CBA DIAS: 14 CM/S
LEFT CBA SYS: 38 CM/S
LEFT CCA DIST DIAS: 8 CM/S
LEFT CCA DIST SYS: 35 CM/S
LEFT CCA MID DIAS: 16 CM/S
LEFT CCA MID SYS: 45 CM/S
LEFT CCA PROX DIAS: 13 CM/S
LEFT CCA PROX SYS: 49 CM/S
LEFT CFA PSV: 77 CM/S
LEFT ECA DIAS: 9 CM/S
LEFT ECA SYS: 48 CM/S
LEFT EXTERNAL ILIAC PSV: 76 CM/S
LEFT ICA DIST DIAS: 23 CM/S
LEFT ICA DIST SYS: 41 CM/S
LEFT ICA MID DIAS: 34 CM/S
LEFT ICA MID SYS: 109 CM/S
LEFT ICA PROX DIAS: 15 CM/S
LEFT ICA PROX SYS: 36 CM/S
LEFT INTERNAL DIMENSION IN SYSTOLE: 2.91 CM (ref 2.1–4)
LEFT PERONEAL SYS PSV: 45 CM/S
LEFT POPLITEAL PSV: 62 CM/S
LEFT POST TIBIAL SYS PSV: 59 CM/S
LEFT PROFUNDA SYS PSV: 91 CM/S
LEFT SUPER FEMORAL DIST SYS PSV: 54 CM/S
LEFT SUPER FEMORAL MID SYS PSV: 70 CM/S
LEFT SUPER FEMORAL OSTIAL SYS PSV: 82 CM/S
LEFT SUPER FEMORAL PROX SYS PSV: 64 CM/S
LEFT TIB/PER TRUNK SYS PSV: 45 CM/S
LEFT VENTRICLE DIASTOLIC VOLUME INDEX: 43.06 ML/M2
LEFT VENTRICLE DIASTOLIC VOLUME: 78.8 ML
LEFT VENTRICLE MASS INDEX: 74.5 G/M2
LEFT VENTRICLE SYSTOLIC VOLUME INDEX: 17.8 ML/M2
LEFT VENTRICLE SYSTOLIC VOLUME: 32.58 ML
LEFT VENTRICULAR INTERNAL DIMENSION IN DIASTOLE: 4.2 CM (ref 3.5–6)
LEFT VENTRICULAR MASS: 136.29 G
LEFT VERTEBRAL DIAS: 18 CM/S
LEFT VERTEBRAL SYS: 45 CM/S
LV LATERAL E/E' RATIO: 15.67
LV SEPTAL E/E' RATIO: 23.5
MV PEAK A VEL: 0.94 M/S
MV PEAK E VEL: 0.94 M/S
MV STENOSIS PRESSURE HALF TIME: 97.56 MS
MV VALVE AREA P 1/2 METHOD: 2.26 CM2
OHS CV CAROTID ULTRASOUND LEFT ICA/CCA RATIO: 2.22
OHS CV CAROTID ULTRASOUND RIGHT ICA/CCA RATIO: 1.49
OHS CV CPX 85 PERCENT MAX PREDICTED HEART RATE MALE: 122
OHS CV CPX MAX PREDICTED HEART RATE: 144
OHS CV CPX PATIENT IS FEMALE: 1
OHS CV CPX PATIENT IS MALE: 0
OHS CV CPX PEAK DIASTOLIC BLOOD PRESSURE: 66 MMHG
OHS CV CPX PEAK HEAR RATE: 73
OHS CV CPX PEAK RATE PRESSURE PRODUCT: 9198
OHS CV CPX PEAK SYSTOLIC BLOOD PRESSURE: 126
OHS CV CPX PERCENT MAX PREDICTED HEART RATE ACHIEVED: 51
OHS CV CPX RATE PRESSURE PRODUCT PRESENTING: 5474
OHS CV LEFT LOWER EXTREMITY ABI (NO CALC): 1.06
OHS CV RIGHT ABI LOWER EXTREMITY (NO CALC): 1.08
PISA TR MAX VEL: 2.2 M/S
PULM VEIN S/D RATIO: 1.02
PV PEAK D VEL: 0.47 M/S
PV PEAK GRADIENT: 1.76 MMHG
PV PEAK S VEL: 0.48 M/S
PV PEAK VELOCITY: 0.66 CM/S
RA MAJOR: 4.89 CM
RA PRESSURE: 8 MMHG
RA WIDTH: 3.16 CM
RETIRED EF AND QEF - SEE NOTES: 58.65 %
RIGHT ANT TIBIAL SYS PSV: 60 CM/S
RIGHT CBA DIAS: 10 CM/S
RIGHT CBA SYS: 30 CM/S
RIGHT CCA DIST DIAS: 11 CM/S
RIGHT CCA DIST SYS: 40 CM/S
RIGHT CCA MID DIAS: 11 CM/S
RIGHT CCA MID SYS: 45 CM/S
RIGHT CCA PROX DIAS: 16 CM/S
RIGHT CCA PROX SYS: 47 CM/S
RIGHT CFA PSV: 109 CM/S
RIGHT ECA DIAS: 9 CM/S
RIGHT ECA SYS: 47 CM/S
RIGHT EXTERNAL ILLIAC PSV: 82 CM/S
RIGHT ICA DIST DIAS: 21 CM/S
RIGHT ICA DIST SYS: 58 CM/S
RIGHT ICA MID DIAS: 27 CM/S
RIGHT ICA MID SYS: 70 CM/S
RIGHT ICA PROX DIAS: 18 CM/S
RIGHT ICA PROX SYS: 39 CM/S
RIGHT PERONEAL SYS PSV: 55 CM/S
RIGHT POPLITEAL PSV: 47 CM/S
RIGHT POST TIBIAL SYS PSV: 64 CM/S
RIGHT PROFUNDA SYS PSV: 95 CM/S
RIGHT SUPER FEMORAL DIST SYS PSV: 50 CM/S
RIGHT SUPER FEMORAL MID SYS PSV: 63 CM/S
RIGHT SUPER FEMORAL OSTIAL SYS PSV: 86 CM/S
RIGHT SUPER FEMORAL PROX SYS PSV: 101 CM/S
RIGHT TIB/PER TRUNK SYS PSV: 56 CM/S
RIGHT VENTRICULAR END-DIASTOLIC DIMENSION: 2.97 CM
RIGHT VERTEBRAL DIAS: 14 CM/S
RIGHT VERTEBRAL SYS: 40 CM/S
SINUS: 3.02 CM
STJ: 2.77 CM
SYSTOLIC BLOOD PRESSURE: 119
TDI LATERAL: 0.06
TDI SEPTAL: 0.04
TDI: 0.05
TR MAX PG: 19.36 MMHG
TRICUSPID ANNULAR PLANE SYSTOLIC EXCURSION: 0.03 CM
TV REST PULMONARY ARTERY PRESSURE: 27.36 MMHG

## 2018-10-05 PROCEDURE — 93306 TTE W/DOPPLER COMPLETE: CPT

## 2018-10-05 PROCEDURE — 93925 LOWER EXTREMITY STUDY: CPT

## 2018-10-05 PROCEDURE — 93880 EXTRACRANIAL BILAT STUDY: CPT

## 2018-10-05 PROCEDURE — 93018 CV STRESS TEST I&R ONLY: CPT | Mod: HCNC,,, | Performed by: INTERNAL MEDICINE

## 2018-10-05 PROCEDURE — 63600175 PHARM REV CODE 636 W HCPCS

## 2018-10-05 PROCEDURE — 93880 EXTRACRANIAL BILAT STUDY: CPT | Mod: 26,,, | Performed by: INTERNAL MEDICINE

## 2018-10-05 PROCEDURE — 93017 CV STRESS TEST TRACING ONLY: CPT

## 2018-10-05 PROCEDURE — 93306 TTE W/DOPPLER COMPLETE: CPT | Mod: 26,,, | Performed by: INTERNAL MEDICINE

## 2018-10-05 PROCEDURE — 93925 LOWER EXTREMITY STUDY: CPT | Mod: 26,,, | Performed by: INTERNAL MEDICINE

## 2018-10-05 PROCEDURE — 78452 HT MUSCLE IMAGE SPECT MULT: CPT | Mod: 26,HCNC,, | Performed by: INTERNAL MEDICINE

## 2018-10-05 PROCEDURE — 93016 CV STRESS TEST SUPVJ ONLY: CPT | Mod: HCNC,,, | Performed by: INTERNAL MEDICINE

## 2018-10-05 RX ORDER — REGADENOSON 0.08 MG/ML
INJECTION, SOLUTION INTRAVENOUS
Status: COMPLETED
Start: 2018-10-05 | End: 2018-10-05

## 2018-10-05 RX ADMIN — REGADENOSON 0.4 MG: 0.08 INJECTION, SOLUTION INTRAVENOUS at 08:10

## 2018-10-19 ENCOUNTER — OFFICE VISIT (OUTPATIENT)
Dept: CARDIOLOGY | Facility: CLINIC | Age: 73
End: 2018-10-19
Payer: MEDICARE

## 2018-10-19 VITALS
OXYGEN SATURATION: 97 % | BODY MASS INDEX: 29.68 KG/M2 | HEART RATE: 57 BPM | RESPIRATION RATE: 16 BRPM | WEIGHT: 167.56 LBS | DIASTOLIC BLOOD PRESSURE: 66 MMHG | SYSTOLIC BLOOD PRESSURE: 112 MMHG

## 2018-10-19 DIAGNOSIS — I11.9 BENIGN HYPERTENSIVE HEART DISEASE WITHOUT HEART FAILURE: ICD-10-CM

## 2018-10-19 DIAGNOSIS — E78.5 HYPERLIPIDEMIA LDL GOAL <70: ICD-10-CM

## 2018-10-19 DIAGNOSIS — R73.03 PREDIABETES: ICD-10-CM

## 2018-10-19 DIAGNOSIS — I25.10 CORONARY ARTERY DISEASE INVOLVING NATIVE CORONARY ARTERY OF NATIVE HEART WITHOUT ANGINA PECTORIS: Primary | ICD-10-CM

## 2018-10-19 DIAGNOSIS — C34.91 SQUAMOUS CELL CARCINOMA OF RIGHT LUNG: ICD-10-CM

## 2018-10-19 DIAGNOSIS — J43.1 PANLOBULAR EMPHYSEMA: ICD-10-CM

## 2018-10-19 PROCEDURE — 99999 PR PBB SHADOW E&M-EST. PATIENT-LVL III: CPT | Mod: PBBFAC,,, | Performed by: INTERNAL MEDICINE

## 2018-10-19 PROCEDURE — 1101F PT FALLS ASSESS-DOCD LE1/YR: CPT | Mod: CPTII,,, | Performed by: INTERNAL MEDICINE

## 2018-10-19 PROCEDURE — 99214 OFFICE O/P EST MOD 30 MIN: CPT | Mod: S$PBB,,, | Performed by: INTERNAL MEDICINE

## 2018-10-19 PROCEDURE — 99213 OFFICE O/P EST LOW 20 MIN: CPT | Mod: PBBFAC | Performed by: INTERNAL MEDICINE

## 2018-10-19 NOTE — PROGRESS NOTES
Subjective:    Patient ID:  Ade Castellano is a 73 y.o. female who presents for follow-up of Results      HPI  Here for follow-up of coronary artery disease.  We received records from the [facility] which showed stent in [place] in 2006 as below.  She denies any worsening cardiopulmonary complaints.  She is no longer on chemotherapy for her lung.  She is trying to increase her level of activity.  She underwent diagnostic testing as below.  This was all within normal limits.  She denies any PND, orthopnea or lower extremity edema.  She has not experienced any dizziness, presyncope or syncope.    Review of Systems   Constitution: Negative.   HENT: Negative.    Eyes: Negative.    Cardiovascular: Negative for chest pain, dyspnea on exertion, irregular heartbeat, leg swelling, near-syncope, orthopnea, palpitations, paroxysmal nocturnal dyspnea and syncope.   Respiratory: Negative for shortness of breath.    Skin: Negative.    Musculoskeletal: Negative.    Gastrointestinal: Negative for abdominal pain, constipation and diarrhea.   Genitourinary: Negative for dysuria.   Neurological: Negative.    Psychiatric/Behavioral: Negative.         Objective:    Physical Exam   Constitutional: She is oriented to person, place, and time. She appears well-developed and well-nourished.   HENT:   Head: Normocephalic and atraumatic.   Eyes: Conjunctivae and EOM are normal. Pupils are equal, round, and reactive to light.   Neck: Normal range of motion. Neck supple. No thyromegaly present.   Cardiovascular: Normal rate and regular rhythm.   No murmur heard.  Pulmonary/Chest: Effort normal and breath sounds normal. No respiratory distress.   Abdominal: Soft. Bowel sounds are normal.   Musculoskeletal: She exhibits no edema.   Neurological: She is alert and oriented to person, place, and time.   Skin: Skin is warm and dry.   Psychiatric: She has a normal mood and affect. Her behavior is normal.     NST:  10-18  ·   Normal Lexiscan  MPI  · The perfusion scan is free of evidence from myocardial ischemia or injury.  · Visually estimated LV function is normal.     ECHO:  · Left ventricle ejection fraction is normal at 65%  · Mitral valve shows trace regurgitation.  · Tricuspid valve shows trace regurgitation.  · The estimated PA systolic pressure is 27.36 mm Hg  · There is mild valvular aortic stenosis.  · LENORE is 1.87 cm2; peak velocity is 1.98 m/s; mean gradient is 7.42 mmHg.    Lower extremity arterial ultrasound:  · No hemodynamically significant plaque bilaterally  · Normal YIN bilaterally     Carotid ultrasound:  · No hemodynamically significant plaque bilaterally    Outside records reviewed from the [facility] Clinic:  Stent to the RCA 2006 in [place]  Heart catheterization 4-15  Left main, lad and left circumflex within normal limits  25% ostial ramus lesion  Overlapping stents in the mid RCA patent    Assessment:       1. Coronary artery disease involving native coronary artery of native heart without angina pectoris    2. Benign hypertensive heart disease without heart failure    3. Hyperlipidemia LDL goal <70    4. Panlobular emphysema    5. Squamous cell carcinoma of right lung    6. Prediabetes         Plan:       -continue current medical therapy  -reassurance in light of testing  -ace/ARB held with marginal SBP  -diet and exercise tolerated    Return to clinic in 6 months

## 2018-10-22 ENCOUNTER — TELEPHONE (OUTPATIENT)
Dept: FAMILY MEDICINE | Facility: CLINIC | Age: 73
End: 2018-10-22

## 2018-10-22 DIAGNOSIS — K63.5 POLYP OF COLON, UNSPECIFIED PART OF COLON, UNSPECIFIED TYPE: Primary | ICD-10-CM

## 2018-10-22 NOTE — TELEPHONE ENCOUNTER
----- Message from Anat Stevenson sent at 10/22/2018  2:17 PM CDT -----  Contact: Self   Patient says she need a colonoscopy and would like to know if she need an appointment first. Please call at [phone].

## 2018-10-22 NOTE — TELEPHONE ENCOUNTER
Patient requesting an order for a colonoscopy.  Would like to know if she needs to come in for office visit with Dr. Huitron.  Please advise.

## 2018-11-15 ENCOUNTER — INFUSION (OUTPATIENT)
Dept: INFUSION THERAPY | Facility: HOSPITAL | Age: 73
End: 2018-11-15
Attending: INTERNAL MEDICINE
Payer: MEDICARE

## 2018-11-15 ENCOUNTER — LAB VISIT (OUTPATIENT)
Dept: LAB | Facility: HOSPITAL | Age: 73
End: 2018-11-15
Attending: INTERNAL MEDICINE
Payer: MEDICARE

## 2018-11-15 ENCOUNTER — OFFICE VISIT (OUTPATIENT)
Dept: HEMATOLOGY/ONCOLOGY | Facility: CLINIC | Age: 73
End: 2018-11-15
Payer: MEDICARE

## 2018-11-15 VITALS
HEART RATE: 53 BPM | SYSTOLIC BLOOD PRESSURE: 98 MMHG | BODY MASS INDEX: 31.1 KG/M2 | OXYGEN SATURATION: 95 % | DIASTOLIC BLOOD PRESSURE: 62 MMHG | HEIGHT: 62 IN | WEIGHT: 169 LBS | TEMPERATURE: 98 F

## 2018-11-15 DIAGNOSIS — J44.9 CHRONIC OBSTRUCTIVE PULMONARY DISEASE, UNSPECIFIED COPD TYPE: Chronic | ICD-10-CM

## 2018-11-15 DIAGNOSIS — Z92.21 HISTORY OF CHEMOTHERAPY: ICD-10-CM

## 2018-11-15 DIAGNOSIS — C34.90 SQUAMOUS CELL CARCINOMA OF LUNG, UNSPECIFIED LATERALITY: Primary | Chronic | ICD-10-CM

## 2018-11-15 DIAGNOSIS — C34.90 SQUAMOUS CELL CARCINOMA LUNG: Primary | ICD-10-CM

## 2018-11-15 DIAGNOSIS — C34.90 SQUAMOUS CELL CARCINOMA OF LUNG, UNSPECIFIED LATERALITY: Chronic | ICD-10-CM

## 2018-11-15 LAB
ALBUMIN SERPL BCP-MCNC: 3.9 G/DL
ALP SERPL-CCNC: 49 U/L
ALT SERPL W/O P-5'-P-CCNC: 37 U/L
ANION GAP SERPL CALC-SCNC: 11 MMOL/L
AST SERPL-CCNC: 42 U/L
BASOPHILS # BLD AUTO: 0.02 K/UL
BASOPHILS NFR BLD: 0.4 %
BILIRUB SERPL-MCNC: 0.5 MG/DL
BUN SERPL-MCNC: 16 MG/DL
CALCIUM SERPL-MCNC: 9.4 MG/DL
CHLORIDE SERPL-SCNC: 103 MMOL/L
CO2 SERPL-SCNC: 25 MMOL/L
CREAT SERPL-MCNC: 0.8 MG/DL
DIFFERENTIAL METHOD: NORMAL
EOSINOPHIL # BLD AUTO: 0.1 K/UL
EOSINOPHIL NFR BLD: 1.7 %
ERYTHROCYTE [DISTWIDTH] IN BLOOD BY AUTOMATED COUNT: 12.9 %
EST. GFR  (AFRICAN AMERICAN): >60 ML/MIN/1.73 M^2
EST. GFR  (NON AFRICAN AMERICAN): >60 ML/MIN/1.73 M^2
GLUCOSE SERPL-MCNC: 125 MG/DL
HCT VFR BLD AUTO: 41.7 %
HGB BLD-MCNC: 13.4 G/DL
LYMPHOCYTES # BLD AUTO: 1.5 K/UL
LYMPHOCYTES NFR BLD: 31.8 %
MCH RBC QN AUTO: 30.5 PG
MCHC RBC AUTO-ENTMCNC: 32.1 G/DL
MCV RBC AUTO: 95 FL
MONOCYTES # BLD AUTO: 0.5 K/UL
MONOCYTES NFR BLD: 9.6 %
NEUTROPHILS # BLD AUTO: 2.6 K/UL
NEUTROPHILS NFR BLD: 56.1 %
PLATELET # BLD AUTO: 192 K/UL
PMV BLD AUTO: 9.4 FL
POTASSIUM SERPL-SCNC: 3.9 MMOL/L
PROT SERPL-MCNC: 6.7 G/DL
RBC # BLD AUTO: 4.39 M/UL
SODIUM SERPL-SCNC: 139 MMOL/L
WBC # BLD AUTO: 4.68 K/UL

## 2018-11-15 PROCEDURE — 63600175 PHARM REV CODE 636 W HCPCS: Mod: HCNC | Performed by: INTERNAL MEDICINE

## 2018-11-15 PROCEDURE — A4216 STERILE WATER/SALINE, 10 ML: HCPCS | Mod: HCNC | Performed by: INTERNAL MEDICINE

## 2018-11-15 PROCEDURE — 1101F PT FALLS ASSESS-DOCD LE1/YR: CPT | Mod: CPTII,HCNC,S$GLB, | Performed by: INTERNAL MEDICINE

## 2018-11-15 PROCEDURE — 99214 OFFICE O/P EST MOD 30 MIN: CPT | Mod: HCNC,S$GLB,, | Performed by: INTERNAL MEDICINE

## 2018-11-15 PROCEDURE — 96523 IRRIG DRUG DELIVERY DEVICE: CPT | Mod: HCNC

## 2018-11-15 PROCEDURE — 99499 UNLISTED E&M SERVICE: CPT | Mod: HCNC,S$GLB,, | Performed by: INTERNAL MEDICINE

## 2018-11-15 PROCEDURE — 85025 COMPLETE CBC W/AUTO DIFF WBC: CPT | Mod: HCNC

## 2018-11-15 PROCEDURE — 36415 COLL VENOUS BLD VENIPUNCTURE: CPT | Mod: HCNC

## 2018-11-15 PROCEDURE — 25000003 PHARM REV CODE 250: Mod: HCNC | Performed by: INTERNAL MEDICINE

## 2018-11-15 PROCEDURE — 80053 COMPREHEN METABOLIC PANEL: CPT | Mod: HCNC

## 2018-11-15 PROCEDURE — 99999 PR PBB SHADOW E&M-EST. PATIENT-LVL IV: CPT | Mod: PBBFAC,HCNC,, | Performed by: INTERNAL MEDICINE

## 2018-11-15 RX ORDER — FLUTICASONE FUROATE, UMECLIDINIUM BROMIDE AND VILANTEROL TRIFENATATE 100; 62.5; 25 UG/1; UG/1; UG/1
POWDER RESPIRATORY (INHALATION)
Refills: 5 | COMMUNITY
Start: 2018-10-25

## 2018-11-15 RX ORDER — SODIUM CHLORIDE 0.9 % (FLUSH) 0.9 %
10 SYRINGE (ML) INJECTION
Status: CANCELLED | OUTPATIENT
Start: 2018-11-15

## 2018-11-15 RX ORDER — MONTELUKAST SODIUM 10 MG/1
10 TABLET ORAL NIGHTLY
Refills: 5 | COMMUNITY
Start: 2018-10-25 | End: 2019-07-25

## 2018-11-15 RX ORDER — SODIUM CHLORIDE 0.9 % (FLUSH) 0.9 %
10 SYRINGE (ML) INJECTION
Status: DISCONTINUED | OUTPATIENT
Start: 2018-11-15 | End: 2018-11-15 | Stop reason: HOSPADM

## 2018-11-15 RX ORDER — HEPARIN 100 UNIT/ML
500 SYRINGE INTRAVENOUS
Status: CANCELLED | OUTPATIENT
Start: 2018-11-15

## 2018-11-15 RX ORDER — HEPARIN 100 UNIT/ML
500 SYRINGE INTRAVENOUS
Status: DISCONTINUED | OUTPATIENT
Start: 2018-11-15 | End: 2018-11-15 | Stop reason: HOSPADM

## 2018-11-15 RX ADMIN — HEPARIN 500 UNITS: 100 SYRINGE at 09:11

## 2018-11-15 RX ADMIN — Medication 10 ML: at 09:11

## 2018-11-15 NOTE — PROGRESS NOTES
Subjective:       Patient ID: Ade Castellano is a 73 y.o. female.    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       Pt hospitalized 11/2017   Ms. Castellano was admitted for acute on chronic respiratory failure requiring intubation and ventilation. Has large lung mass in right lung with probable post obstructive pneumonia resulting in COPD exacerbation. But also, patient had ran out of home O2 prior to onset of symptoms, likely contributing to presentation. Initiated on broad spectrum abx, IV steroids, duo-nebs and pulmonology consulted for ventilator co-management. Successfully extubated to BiPAP on 11/30. Then de-escalated to low flow nasal cannula. Abx tailored to augmentin for broad coverage, including anaerobic bacteria /     CTA chest 11/29/2017 reveals   Large right hilar mass with involvement of adjacent segmental pulmonary arterial branches and bronchi with associated postobstructive atelectasis and volume loss in the right middle lobe with adjacent ground glass opacity.  Findings highly concerning for underlying neoplastic process. Mediastinal and axillary adenopathy, with a right axillary lymph node measuring up to 2.0 cm in short axis diameter. No definite evidence of pulmonary thromboembolism.    She is followed by Pulm  She underwent rt axillary LN bx at outside facility- on 1/16/2018 at [facility]  Pathology revealed metastatic poorly differentiated carcinoma of unknown primary site  TTF-1 negative, napsin negative  , cytokeratin 7 positive, cytokeratin 20 negative, p63 negative, cytokeratin 5/6 focal dim positivity    She next underwent lung bx at [facility] l1/31/2017    Pathology revealed benign lung tissue    She underwent repeat Right Lung Bx 2/14/2018 Pathology reveals Squamous cell carcinoma  PD-L1 10% low expression  EGFR NEG  ALK NEG  BRAF pending  Outside slides axillary LN specimen  requested for review/comparison to lung bx findings     Pt hospitalized  6/12/2018 with severe anemia with Hb 4.9g/dl  as well as neutropenia with white blood cell count of 910, .    She underwent 3 units prbc with improvement in Hb to 12.3g/dl and discharged next day     She s/p  cycle 6 of carboplatin/Abraxane    PET/CT  4/19/2018 reveals there has been a excellent response to therapy.  At least 90% reduction in malignant activity.    Today, she  Is doing well  She reports less LOGAN   She wears O2 only when at home and at night   She reports she is wearing O2 less lately   Appetite and weight stable   She continues with occasional cough productive with clear sputum   No fatigue  No hemoptysis   Mild arthralgias- chronic,stable  No bleeding-urinary/rectal/nasal      PET/CT 9/28/2018  No definite evidence of recurrent neoplasm.  The new right axillary lymph node is likely benign/reactive and would be unlikely to be metastatic disease.  Left lung abnormality and right lung abnormalities are most likely post radiation or inflammatory change.        PrevHx:She had an abnormal mammogram 6/4/2014 which  Revealed a round solid mass 6mm in rt breast.  She then underwent U/S guided core bx of rt breast mass on 6/17/2014.  Pathology revealed infiltrating ductal carcinoma Grade 3 with tumor  Present in thin-walled spaces suggestive of lymphatic spaces. Hormone  Receptor status on tumor specimen revealed ER negative 0% ,  MT negative 0% and Her 2 jose maria negative.  She subsequently underwent rt segmental mastectomy and SLN bx  On 7/11/2014. Pathology of rt breast lumpectomy revealed invasive  Ductal carcinoma with micropapillary pattern ( Invasive micropapillary  Ca) with max tumor dimension 11.5mm  "with suggestion of tumor in   Thin walled spaces c/w lymphovascular involvement. Surgical  Margins free of tumor, grade 3, ER/CO neg , Her 2 jose maria neg   With [place] Lymph node negative for Neoplasm.   Pathologic staging [id number](i-)  Her mother was diagnosed with breast cancer in her 50's/  She has no family history of ovarian cancer.           Review of Systems   Constitutional: Negative for appetite change, fatigue, fever and unexpected weight change.   HENT: Negative for mouth sores and rhinorrhea.    Eyes: Negative for visual disturbance.   Respiratory: Positive for cough. Negative for shortness of breath (LOGAN-improved).    Cardiovascular: Negative for chest pain.   Gastrointestinal: Negative for abdominal pain and diarrhea.   Genitourinary: Negative for frequency.   Musculoskeletal: Positive for arthralgias and back pain.   Skin: Negative for rash.   Neurological: Negative for dizziness and headaches.   Hematological: Negative for adenopathy.   Psychiatric/Behavioral: The patient is not nervous/anxious.        Objective:        Vitals:    11/15/18 0823 11/15/18 0825   BP: 98/62    BP Location: Right arm    Patient Position: Sitting    BP Method: Large (Automatic)    Pulse: (!) 54 (!) 53   Temp: 97.5 °F (36.4 °C)    TempSrc: Oral    SpO2: 95% 95%   Weight: 76.7 kg (169 lb)    Height: 5' 2" (1.575 m)          Physical Exam   Constitutional: She is oriented to person, place, and time. She appears well-developed and well-nourished.   HENT:   Head: Normocephalic.   Mouth/Throat: Oropharynx is clear and moist. No oropharyngeal exudate.   Eyes: Conjunctivae and lids are normal. Pupils are equal, round, and reactive to light. No scleral icterus.   Neck: Normal range of motion. Neck supple. No thyromegaly present.   Cardiovascular: Normal rate, regular rhythm and normal heart sounds.   No murmur heard.  Pulmonary/Chest: Effort normal and breath sounds normal. She has no wheezes. Right breast exhibits no inverted nipple, " no mass, no nipple discharge and no skin change. Left breast exhibits no inverted nipple, no mass, no nipple discharge and no skin change.   Abdominal: Soft. Bowel sounds are normal. She exhibits mass. There is no hepatosplenomegaly. There is no tenderness. There is no rebound and no guarding.   Musculoskeletal: Normal range of motion. She exhibits no edema or tenderness.   Lymphadenopathy:     She has no cervical adenopathy.     She has no axillary adenopathy.        Right: No supraclavicular adenopathy present.        Left: No supraclavicular adenopathy present.   Neurological: She is alert and oriented to person, place, and time. No cranial nerve deficit. Coordination normal.   Skin: Skin is warm and dry. No ecchymosis, no petechiae and no rash noted. No erythema.   Psychiatric: She has a normal mood and affect.             CT a/p w/contrast 11/29/2018   1. No acute abnormality identified within the abdomen and pelvis.    2.  There are a few nonspecific prominent lymph nodes in the upper abdomen including a periesophageal lymph node which measures 1.0 cm in short axis diameter.    3.  Significant abdominal aortic atherosclerosis and abdominal aorta ectasia.    4.  Additional findings as above.    PET/CT 1/26/2018   1.  Intense FDG uptake within the known right infrahilar lung mass, compatible with malignancy.  It is unclear if this represents primary lung malignancy or metastatic breast cancer.    2.  Intensely hypermetabolic right axillary, retropectoral, and lower right cervical lymph nodes, compatible with metastases.    3.  Abnormal FDG uptake along right breast skin thickening, new from prior chest CTA and mammogram.  This may relate to localized edema/inflammation, though correlation with physical exam and mammography are recommended to exclude underlying inflammatory carcinoma.      MRI Brain w w/out contrast 2/9/2018  1.  No evidence of intracranial metastases.  2.  Sinus disease       Rt axillary LN bx  at outside facility- on 1/16/2018 at [facility]  Pathology revealed metastatic poorly differentiated carcinoma of unknown primary  site 1/16/2018 at [facility]  TTF-1 negative, napsin negative  , cytokeratin 7 positive, cytokeratin 20 negative, p63 negative, cytokeratin 5/6 focal dim positivity    LUNG BIOPSY 1/31/2017   Pathology revealed benign lung tissue         SPECIMEN  1) Right Lung Bx 2/14/2018  Supplemental Diagnosis  Immunohistochemical stains show strong nuclear staining for p63 in essentially all tumor cells and very strong  cytoplasmic and membrane staining for CK5/6, also in essentially all tumor cells. TTF-1 and CK7 are negative  within tumor cells but do stain native pulmonary elements present within the biopsy. A stain for mucicarmine is  negative. Positive and negative controls function appropriately.  Final diagnosis: Specimen submitted as right lung biopsy  -Squamous cell carcinoma  (Electronically Signed: 2018-02-20 09:12:07 )  Diagnosed by: Phong Thompson  FINAL PATHOLOGIC DIAGNOSIS  Fragments of pulmonary parenchyma (submitted as right lung biopsy):    FINAL PATHOLOGIC DIAGNOSIS 1. Lymph node, right axilla, biopsy, review of 10 outside slides [facility], JS [phone],  collected January 11, 2018: Metastatic poorly differentiated carcinoma (see comment).  The histologic section shows fibrous stroma infiltrated by nest of poorly differentiated malignant cells including  occasional dyskeratotic cells morphologically suggestive of metastatic squamous cell carcinoma.  Immunohistochemical stains are nonspecific; the cells are positive for cytokeratin 7 and cytokeratin 5/6 (focal) and  negative for cytokeratin 20, TTF-1, p63, GCDFP, mammoglobin, and CEA        PET/CT 9/28/2018  No definite evidence of recurrent neoplasm.  The new right axillary lymph node is likely benign/reactive and would be unlikely to be metastatic disease.    Left lung abnormality and  right lung abnormalities are most likely post radiation or inflammatory change.      Assessment:       1. Squamous cell carcinoma of lung, unspecified laterality    2. History of chemotherapy    3. Chronic obstructive pulmonary disease, unspecified COPD type        Plan:   ECOG 1  1-3 Pt with widely metastatic squamous cell CA lung     Pt with hx of Stage 1A invasive ductal carcinoma rt breast s/p rt segmental mastectomy and SLN bx 7/11/2014 ER/LA neg HER 2 jose maria neg [id number](i-).  S/p adjuvant RT completed 9/2014 Pt declined adjuvant chemo  Follow-up Mammo 6/12/2017 No mammographic evidence of malignancy.  Pt  hospitalized 11/2017 with acute-on-chronic  resp failure  Abnormal CT imaging revealing Large right hilar mass with involvement of  adjacent segmental pulmonary arterial branches and bronchi with associated postobstructive atelectasis  and involvement of mediastinal and axillary adenopathy   S/p rt axillary LN bx ( outside facility) - metastatic poorly differentiated carcinoma of unknown primary  site  status post recent lung biopsy-benign lung tissue  PET/CT 1/26/2018   Intense FDG uptake within the known right infrahilar lung mass, compatible with malignancy.   Intensely hypermetabolic right axillary, retropectoral, and lower right cervical lymph nodes, compatible with metastases.  Abnormal FDG uptake along right breast skin thickening, new from prior chest CTA and mammogram.    Repeat lung bx reveals Squamous Cell CA lung  PD-L1 10% low expression  EGFR NEG  ALK NEG      She s/p  cycle 6 of carboplatin/Abraxane Day1 completed 7/2/2018 ( day 15 held due to prolonged cytopenias)    PET/CT No definite evidence of recurrent neoplasm.  The new right axillary lymph node is likely benign/reactive and would be unlikely to be metastatic disease.  Left lung abnormality and right lung abnormalities are most likely post radiation or inflammatory change.    Follow-up with Pulmonology   Pt doing well   Follow-up   6 weeks   mo with cbc,cmp prior to f/u   Cont PAC flush prn    >25 minutes spent during this visit of which greater than 50% devoted to counseling and coordination of care regarding diagnosis and management plan.    Cc: Swetha Katz M.D.         MD Tory Roberson MD

## 2018-11-15 NOTE — PLAN OF CARE
Problem: Patient Care Overview  Goal: Plan of Care Review  Outcome: Ongoing (interventions implemented as appropriate)  Tolerated port flush. No complaints voiced. Pt has next appt.

## 2018-11-26 RX ORDER — ISOSORBIDE MONONITRATE 60 MG/1
60 TABLET, EXTENDED RELEASE ORAL DAILY
Qty: 30 TABLET | Refills: 6 | Status: SHIPPED | OUTPATIENT
Start: 2018-11-26 | End: 2019-10-07 | Stop reason: SDUPTHER

## 2018-11-28 ENCOUNTER — HOSPITAL ENCOUNTER (OUTPATIENT)
Facility: HOSPITAL | Age: 73
Discharge: HOME | End: 2018-11-28
Attending: INTERNAL MEDICINE | Admitting: INTERNAL MEDICINE
Payer: MEDICARE

## 2018-11-28 ENCOUNTER — ANESTHESIA (OUTPATIENT)
Dept: ENDOSCOPY | Facility: HOSPITAL | Age: 73
End: 2018-11-28
Payer: MEDICARE

## 2018-11-28 ENCOUNTER — ANESTHESIA EVENT (OUTPATIENT)
Dept: ENDOSCOPY | Facility: HOSPITAL | Age: 73
End: 2018-11-28
Payer: MEDICARE

## 2018-11-28 VITALS
RESPIRATION RATE: 20 BRPM | HEIGHT: 62 IN | BODY MASS INDEX: 30.73 KG/M2 | WEIGHT: 167 LBS | DIASTOLIC BLOOD PRESSURE: 68 MMHG | TEMPERATURE: 98 F | SYSTOLIC BLOOD PRESSURE: 130 MMHG | HEART RATE: 49 BPM | OXYGEN SATURATION: 99 %

## 2018-11-28 DIAGNOSIS — Z12.11 COLON CANCER SCREENING: ICD-10-CM

## 2018-11-28 PROCEDURE — G0121 COLON CA SCRN NOT HI RSK IND: HCPCS | Mod: HCNC | Performed by: INTERNAL MEDICINE

## 2018-11-28 PROCEDURE — 37000009 HC ANESTHESIA EA ADD 15 MINS: Mod: HCNC | Performed by: INTERNAL MEDICINE

## 2018-11-28 PROCEDURE — 25000003 PHARM REV CODE 250: Mod: HCNC | Performed by: INTERNAL MEDICINE

## 2018-11-28 PROCEDURE — D9220A PRA ANESTHESIA: Mod: HCNC,ANES,, | Performed by: ANESTHESIOLOGY

## 2018-11-28 PROCEDURE — 37000008 HC ANESTHESIA 1ST 15 MINUTES: Mod: HCNC | Performed by: INTERNAL MEDICINE

## 2018-11-28 PROCEDURE — 25000003 PHARM REV CODE 250: Mod: HCNC | Performed by: NURSE ANESTHETIST, CERTIFIED REGISTERED

## 2018-11-28 PROCEDURE — 63600175 PHARM REV CODE 636 W HCPCS: Mod: HCNC | Performed by: NURSE ANESTHETIST, CERTIFIED REGISTERED

## 2018-11-28 RX ORDER — LIDOCAINE HCL/PF 100 MG/5ML
SYRINGE (ML) INTRAVENOUS
Status: DISCONTINUED | OUTPATIENT
Start: 2018-11-28 | End: 2018-11-28

## 2018-11-28 RX ORDER — SODIUM CHLORIDE 9 MG/ML
INJECTION, SOLUTION INTRAVENOUS ONCE
Status: COMPLETED | OUTPATIENT
Start: 2018-11-28 | End: 2018-11-28

## 2018-11-28 RX ORDER — LIDOCAINE HYDROCHLORIDE 20 MG/ML
INJECTION, SOLUTION INFILTRATION; PERINEURAL
Status: DISCONTINUED
Start: 2018-11-28 | End: 2018-11-28 | Stop reason: HOSPADM

## 2018-11-28 RX ORDER — GLYCOPYRROLATE 0.2 MG/ML
INJECTION INTRAMUSCULAR; INTRAVENOUS
Status: DISCONTINUED
Start: 2018-11-28 | End: 2018-11-28 | Stop reason: HOSPADM

## 2018-11-28 RX ORDER — GLYCOPYRROLATE 0.2 MG/ML
INJECTION INTRAMUSCULAR; INTRAVENOUS
Status: DISCONTINUED | OUTPATIENT
Start: 2018-11-28 | End: 2018-11-28

## 2018-11-28 RX ORDER — PROPOFOL 10 MG/ML
VIAL (ML) INTRAVENOUS
Status: DISCONTINUED | OUTPATIENT
Start: 2018-11-28 | End: 2018-11-28

## 2018-11-28 RX ORDER — SODIUM CHLORIDE 9 MG/ML
INJECTION, SOLUTION INTRAVENOUS CONTINUOUS PRN
Status: DISCONTINUED | OUTPATIENT
Start: 2018-11-28 | End: 2018-11-28

## 2018-11-28 RX ORDER — PROPOFOL 10 MG/ML
VIAL (ML) INTRAVENOUS
Status: DISCONTINUED
Start: 2018-11-28 | End: 2018-11-28 | Stop reason: HOSPADM

## 2018-11-28 RX ADMIN — PROPOFOL 20 MG: 10 INJECTION, EMULSION INTRAVENOUS at 11:11

## 2018-11-28 RX ADMIN — SODIUM CHLORIDE: 0.9 INJECTION, SOLUTION INTRAVENOUS at 10:11

## 2018-11-28 RX ADMIN — LIDOCAINE HYDROCHLORIDE 100 MG: 20 INJECTION, SOLUTION INTRAVENOUS at 11:11

## 2018-11-28 RX ADMIN — PROPOFOL 100 MG: 10 INJECTION, EMULSION INTRAVENOUS at 11:11

## 2018-11-28 RX ADMIN — GLYCOPYRROLATE 0.1 MG: 0.2 INJECTION, SOLUTION INTRAMUSCULAR; INTRAVENOUS at 10:11

## 2018-11-28 NOTE — TRANSFER OF CARE
"Anesthesia Transfer of Care Note    Patient: Ade Castellano    Procedure(s) Performed: Procedure(s) (LRB):  COLONOSCOPY (N/A)    Patient location: GI    Anesthesia Type: general    Transport from OR: Transported from OR on room air with adequate spontaneous ventilation    Post pain: adequate analgesia    Post assessment: no apparent anesthetic complications and tolerated procedure well    Post vital signs: stable    Level of consciousness: responds to stimulation and sedated    Nausea/Vomiting: no nausea/vomiting    Complications: none    Transfer of care protocol was followed      Last vitals:   Visit Vitals  BP (!) 179/75 (BP Location: Left arm, Patient Position: Lying)   Pulse (!) 56   Temp 36.6 °C (97.9 °F) (Oral)   Resp 14   Ht 5' 2" (1.575 m)   Wt 75.8 kg (167 lb)   LMP  (LMP Unknown)   SpO2 97%   Breastfeeding? No   BMI 30.54 kg/m²     "

## 2018-11-28 NOTE — [employer] PATIENT INSTRUCTIONS
Discharge Summary/Instructions after an Endoscopic Procedure  Patient Name: Ade Castellano  Patient MRN: [id number]  Patient YOB: 1945  Wednesday, November 28, 2018  Nura Urbina MD  RESTRICTIONS:  During your procedure today, you received medications for sedation.  These   medications may affect your judgment, balance and coordination.  Therefore,   for 24 hours, you have the following restrictions:   - DO NOT drive a car, operate machinery, make legal/financial decisions,   sign important papers or drink alcohol.    ACTIVITY:  Today: no heavy lifting, straining or running due to procedural   sedation/anesthesia.  The following day: return to full activity including work.  DIET:  Eat and drink normally unless instructed otherwise.     TREATMENT FOR COMMON SIDE EFFECTS:  - Mild abdominal pain, nausea, belching, bloating or excessive gas:  rest,   eat lightly and use a heating pad.  - Sore Throat: treat with throat lozenges and/or gargle with warm salt   water.  - Because air was used during the procedure, expelling large amounts of air   from your rectum or belching is normal.  - If a bowel prep was taken, you may not have a bowel movement for 1-3 days.    This is normal.  SYMPTOMS TO WATCH FOR AND REPORT TO YOUR PHYSICIAN:  1. Abdominal pain or bloating, other than gas cramps.  2. Chest pain.  3. Back pain.  4. Signs of infection such as: chills or fever occurring within 24 hours   after the procedure.  5. Rectal bleeding, which would show as bright red, maroon, or black stools.   (A tablespoon of blood from the rectum is not serious, especially if   hemorrhoids are present.)  6. Vomiting.  7. Weakness or dizziness.  GO DIRECTLY TO THE NEAREST EMERGENCY ROOM IF YOU HAVE ANY OF THE FOLLOWING:      Difficulty breathing              Chills and/or fever over 101 F   Persistent vomiting and/or vomiting blood   Severe abdominal pain   Severe chest pain   Black, tarry stools   Bleeding- more than one  tablespoon   Any other symptom or condition that you feel may need urgent attention  Your doctor recommends these additional instructions:  If any biopsies were taken, your doctors clinic will contact you in 1 to 2   weeks with any results.  - Repeat colonoscopy in 3 months for surveillance.   - Return to referring physician as previously scheduled.   - Discharge patient to home (via wheelchair).  For questions, problems or results please call your physician - Nura Urbina MD at Work:  [phone].  [facility] can be reached at [phone]     IF A COMPLICATION OR EMERGENCY SITUATION ARISES AND YOU ARE UNABLE TO REACH   YOUR PHYSICIAN - GO DIRECTLY TO THE EMERGENCY ROOM.  Nura Urbina MD  11/28/2018 11:16:46 AM  This report has been verified and signed electronically.  [employer]

## 2018-11-28 NOTE — DISCHARGE INSTRUCTIONS
Colonoscopy     A camera attached to a flexible tube with a viewing lens is used to take video pictures.     Colonoscopy is a test to view the inside of your lower digestive tract (colon and rectum). Sometimes it can show the last part of the small intestine (ileum). During the test, small pieces of tissue may be removed for testing. This is called a biopsy. Small growths, such as polyps, may also be removed.   Why is colonoscopy done?  The test is done to help look for colon cancer. And it can help find the source of abdominal pain, bleeding, and changes in bowel habits. It may be needed once a year, depending on factors such as your:  · Age  · Health history  · Family health history  · Symptoms  · Results from any prior colonoscopy  Risks and possible complications  These include:  · Bleeding               · A puncture or tear in the colon   · Risks of anesthesia  · A cancer lesion not being seen  Getting ready   To prepare for the test:  · Talk with your healthcare provider about the risks of the test (see below). Also ask your healthcare provider about alternatives to the test.  · Tell your healthcare provider about any medicines you take. Also tell him or her about any health conditions you may have.  · Make sure your rectum and colon are empty for the test. Follow the diet and bowel prep instructions exactly. If you dont, the test may need to be rescheduled.  · Plan for a friend or family member to drive you home after the test.     Colonoscopy provides an inside view of the entire colon.     You may discuss the results with your doctor right away or at a future visit.  During the test   The test is usually done in the hospital on an outpatient basis. This means you go home the same day. The procedure takes about 30 minutes. During that time:  · You are given relaxing (sedating) medicine through an IV line. You may be drowsy, or fully asleep.  · The healthcare provider will first give you a physical exam to  check for anal and rectal problems.  · Then the anus is lubricated and the scope inserted.  · If you are awake, you may have a feeling similar to needing to have a bowel movement. You may also feel pressure as air is pumped into the colon. Its OK to pass gas during the procedure.  · Biopsy, polyp removal, or other treatments may be done during the test.  After the test   You may have gas right after the test. It can help to try to pass it to help prevent later bloating. Your healthcare provider may discuss the results with you right away. Or you may need to schedule a follow-up visit to talk about the results. After the test, you can go back to your normal eating and other activities. You may be tired from the sedation and need to rest for a few hours.  Date Last Reviewed: 11/1/2016 © 2000-2017 [employer]. [address] [place] [address], [place] [address]. All rights reserved. This information is not intended as a substitute for professional medical care. Always follow your healthcare professional's instructions.

## 2018-11-28 NOTE — TRANSFER OF CARE
"Anesthesia Transfer of Care Note    Patient: Ade Castellano    Procedure(s) Performed: Procedure(s) (LRB):  COLONOSCOPY (N/A)    Patient location: GI    Anesthesia Type: general    Transport from OR: Transported from OR on room air with adequate spontaneous ventilation    Post pain: adequate analgesia    Post assessment: no apparent anesthetic complications and tolerated procedure well    Level of consciousness: sedated and responds to stimulation    Nausea/Vomiting: no nausea/vomiting    Complications: none    Transfer of care protocol was followed      Last vitals:   Visit Vitals  BP (!) 179/75 (BP Location: Left arm, Patient Position: Lying)   Pulse (!) 56   Temp 36.6 °C (97.9 °F) (Oral)   Resp 14   Ht 5' 2" (1.575 m)   Wt 75.8 kg (167 lb)   LMP  (LMP Unknown)   SpO2 97%   Breastfeeding? No   BMI 30.54 kg/m²     "

## 2018-11-28 NOTE — DISCHARGE SUMMARY
[facility]  Discharge Summary      Admit Date: 11/28/2018    Discharge Date and Time:  11/28/2018 11:13 AM    Attending Physician: Nura Urbina MD     Reason for Admission: Surveillance colonoscopy    Procedures Performed: Procedure(s) (LRB):  COLONOSCOPY (N/A)    Hospital Course (synopsis of major diagnoses, care, treatment, and services provided during the course of the hospital stay): Outpatient colonoscopy     Consults: none    Significant Diagnostic Studies: Colonoscopy    Final Diagnoses:    Principal Problem: <principal problem not specified>   Secondary Diagnoses: Surveillance colonoscopy    Discharged Condition: good    Disposition: Home or Self Care    Follow Up/Patient Instructions: Follow-up with referring physician             Resume previous diet and activity.    Medications:  Reconciled Home Medications:      Medication List      ASK your doctor about these medications    * albuterol 90 mcg/actuation inhaler  Commonly known as:  VENTOLIN HFA  INHALE 2 PUFF(S) BY MOUTH EVERY 4 - 6 HOURS AS NEEDED FOR SHORTNESS OF BREATH or WHEEZING     * albuterol 2.5 mg /3 mL (0.083 %) nebulizer solution  Commonly known as:  PROVENTIL  NEBULIZE 1 vial EVERY 6 HOURS AS NEEDED FOR WHEEZING     aspirin 325 MG EC tablet  Commonly known as:  ECOTRIN  Take 325 mg by mouth once daily.     fluticasone 50 mcg/actuation nasal spray  Commonly known as:  FLONASE  USE TWO SPRAYS IN EACH NOSTRIL ONCE A DAY     isosorbide mononitrate 60 MG 24 hr tablet  Commonly known as:  IMDUR  Take 1 tablet (60 mg total) by mouth once daily.     metoprolol tartrate 25 MG tablet  Commonly known as:  LOPRESSOR  Take 1 tablet (25 mg total) by mouth 2 (two) times daily.     montelukast 10 mg tablet  Commonly known as:  SINGULAIR  Take 10 mg by mouth nightly. at bedtime.     NITROSTAT 0.4 MG SL tablet  Generic drug:  nitroGLYCERIN  place 1 tablet under the tongue AS NEEDED no more than 3 in 15 minutes     rosuvastatin 20 MG  tablet  Commonly known as:  CRESTOR  Take 1 tablet (20 mg total) by mouth once daily.     TRELEGY ELLIPTA 100-62.5-25 mcg Dsdv  Generic drug:  fluticasone-umeclidin-vilanter  INHALE ONE PUFF INTO THE LUNGS ONCE A DAY         * This list has 2 medication(s) that are the same as other medications prescribed for you. Read the directions carefully, and ask your doctor or other care provider to review them with you.              No discharge procedures on file.

## 2018-11-29 NOTE — ANESTHESIA PREPROCEDURE EVALUATION
11/29/2018  Ade Castellano is a 73 y.o., female.    Anesthesia Evaluation     I have reviewed the Nursing Notes.      Review of Systems  Anesthesia Hx:  No problems with previous Anesthesia   Social:  Former Smoker    Hematology/Oncology:         -- Cancer in past history: Breast   Cardiovascular:   Denies Pacemaker. Hypertension Past MI CAD    Angina, with exertion hyperlipidemia Echo 2017:  CONCLUSIONS     1 - Normal left ventricular systolic function (EF 60-65%).     2 - No wall motion abnormalities.     3 - Concentric remodeling.     4 - Trivial to mild aortic stenosis, LENORE = 1.8 cm2, peak velocity = 1.7 m/s.     5 - Mild tricuspid regurgitation.     6 - Pulmonary hypertension. The estimated PA systolic pressure is 54 mmHg.    Pulmonary:   COPD Sleep Apnea Hx lung ca   Renal/:  Renal/ Normal     Hepatic/GI:   Bowel Prep. PUD, Denies Hiatal Hernia.  Denies GERD. Denies Liver Disease.  Denies Hepatitis.    Neurological:   Denies CVA. Neuromuscular Disease, (bell's palsy)  Denies Seizures.    Endocrine:  Endocrine Normal        Physical Exam  General:  Obesity    Airway/Jaw/Neck:  AIRWAY FINDINGS: Normal      Chest/Lungs:  Chest/Lungs Clear    Heart/Vascular:  Heart Findings: Normal       Mental Status:  Mental Status Findings: Normal        Anesthesia Plan  Type of Anesthesia, risks & benefits discussed:  Anesthesia Type:  general  Patient's Preference:   Intra-op Monitoring Plan: standard ASA monitors  Intra-op Monitoring Plan Comments:   Post Op Pain Control Plan:   Post Op Pain Control Plan Comments:   Induction:   IV  Beta Blocker:  Patient is on a Beta-Blocker and has received one dose within the past 24 hours (No further documentation required).       Informed Consent: Patient understands risks and agrees with Anesthesia plan.  Questions answered. Anesthesia consent signed with patient.  ASA  Score: 3     Day of Surgery Review of History & Physical:  There are no significant changes.  H&P update referred to the provider.         Ready For Surgery From Anesthesia Perspective.

## 2018-11-29 NOTE — ANESTHESIA POSTPROCEDURE EVALUATION
"Anesthesia Post Evaluation    Patient: Ade Castellano    Procedure(s) Performed: Procedure(s) (LRB):  COLONOSCOPY (N/A)    Final Anesthesia Type: general  Patient location during evaluation: GI PACU  Patient participation: Yes- Able to Participate  Level of consciousness: awake and alert  Post-procedure vital signs: reviewed and stable  Pain management: adequate  Airway patency: patent  PONV status at discharge: No PONV  Anesthetic complications: no      Cardiovascular status: blood pressure returned to baseline and hemodynamically stable  Respiratory status: unassisted and spontaneous ventilation  Hydration status: euvolemic  Follow-up not needed.        Visit Vitals  /68   Pulse (!) 49   Temp 36.6 °C (97.9 °F) (Oral)   Resp 20   Ht 5' 2" (1.575 m)   Wt 75.8 kg (167 lb)   LMP  (LMP Unknown)   SpO2 99%   Breastfeeding? No   BMI 30.54 kg/m²       Pain/Sergio Score: Presence of Pain: denies (11/28/2018 11:42 AM)  Sergio Score: 10 (11/28/2018 11:42 AM)        "

## 2018-12-09 NOTE — H&P
"Chief Complaint:  "I need a colonoscopy."    HPI:  The patient is a 73 year old woman presenting for a surveillance colonoscopy. She has several polyps removed one year ago and one was removed piecemeal and tattooed.  A follow-up colonoscopy showed no residual polyp at the site.  The patient denies any abdominal pain, weight loss, nausea, emesis, diarrhea, constipation, melena, or hematochezia.  The patient also denies a family history of colon cancer.    Past Medical History:   Diagnosis Date    Acute respiratory failure with hypoxia and hypercapnia 11/29/2017    Angina pectoris     Arthritis     Bell's palsy     left facial weakness    Breast cancer     RIGHT    CAD (coronary artery disease)     Cervical cancer     Chronic bronchitis     COPD (chronic obstructive pulmonary disease)     Dr. Katz    Dental bridge present     Emphysema of lung     H/O colonoscopy 06/2017    due for repeat colonsocopy in 6/2018    History of heart artery stent     Dr. Ortiz  x2 stents    Hyperlipidemia     Hypertension     Lung cancer     Myocardial infarction     SUNITHA (obstructive sleep apnea)     intolerant to mask    Pneumonia     Pneumonia due to other staphylococcus     PUD (peptic ulcer disease)     Severe anemia 6/11/2018    Sleep apnea     Vaginal delivery     x1     Past Surgical History:   Procedure Laterality Date    ADENOIDECTOMY      [id number] N/A 2/14/2018    Performed by [facility] at [facility] OR    BIOPSY-SENTINEL NODE (51470) Right 7/11/2014    Performed by Kameron Newberry [facility] at [facility] OR    BIOPSY-ULTRASOUND GUIDED CORE Right 6/17/2014    Performed by [facility] at [facility] OR    BREAST BIOPSY Right     x3    BREAST SURGERY      lumpectomy right side     CERVIX SURGERY      cone    COLONOSCOPY N/A 3/17/2017    Procedure: COLONOSCOPY;  Surgeon: Julio Rudd MD;  Location: [facility] ENDO;  Service: Endoscopy;  Laterality: N/A;    COLONOSCOPY N/A 6/30/2017    " Telephone Encounter by Peabody, Diane, RN at 12/29/16 01:49 PM     Author:  Peabody, Diane, RN Service:  (none) Author Type:  Registered Nurse     Filed:  12/29/16 01:49 PM Encounter Date:  12/27/2016 Status:  Signed     :  Peabody, Diane, RN (Registered Nurse)            Routed to Disability Pool[DP1.1M]      Revision History        User Key Date/Time User Provider Type Action    > DP1.1 12/29/16 01:49 PM Peabody, Diane, RN Registered Nurse Sign    M - Manual             Procedure: COLONOSCOPY;  Surgeon: Julio Rudd MD;  Location: [facility] ENDO;  Service: Endoscopy;  Laterality: N/A;    COLONOSCOPY N/A 2017    Performed [facility] at [facility] ENDO    COLONOSCOPY N/A 3/17/2017    Performed by [facility] at [facility] ENDO    ESOPHAGOGASTRODUODENOSCOPY (EGD) N/A 10/11/2017    Performed by [facility] at [facility] ENDO    EYE SURGERY Left 2018    [id number] N/A 3/7/2018    Performed by [facility] Provider at [facility] OR    LUMPECTOMY RIGHT BREAST S/P NEEDLE LOCALIZATION  Right 2014    Performed by [facility]berry [facility] at [facility] OR    PORTACATH PLACEMENT Right 2018    sweat glands axillary regions      TONSILLECTOMY       Family History   Problem Relation Age of Onset    Cancer Mother         breast    Heart disease Mother     Breast cancer Mother     Cancer Father         lung-smoker     Cancer Sister         lung-smoker     Heart attack Sister     Cancer Maternal Grandmother     Heart disease Maternal Grandmother     Cancer Maternal Grandfather     Heart disease Maternal Grandfather     Cancer Paternal Grandmother     Cancer Paternal Grandfather     Cancer Sister         mets not sure where it started      Social History     Socioeconomic History    Marital status: Single     Spouse name: Not on file    Number of children: Not on file    Years of education: Not on file    Highest education level: Not on file   Social Needs    Financial resource strain: Not on file    Food insecurity - worry: Not on file    Food insecurity - inability: Not on file    Transportation needs - medical: Not on file    Transportation needs - non-medical: Not on file   Occupational History    Not on file   Tobacco Use    Smoking status: Former Smoker     Packs/day: 0.25     Years: 50.00     Pack years: 12.50     Types: Cigarettes     Last attempt to quit: 2009     Years since quittin.2    Smokeless tobacco: Never Used   Substance and Sexual  Activity    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  No dysuria.  No heartburn or dysphagia.  Otherwise as stated above.  Ten other systems negative.    There were no vitals filed for this visit.  P.E.:  GEN: A x O x 3, NAD  SKIN: No jaundice  HEENT: EOMI, PERRL, anicteric sclera  CV: RRR, no M/R/G  Chest: CTA B  Abdomen: soft, NTND, normoactive BS  Ext: No C/C/E.  2+ dorsalis pedis pulses B  Neuro: No asterixes or tremors.  CN II-XII intact  Musculoskeletal: 5/5 strength bilaterally    Labs:  Lab Results   Component Value Date    WBC 4.68 11/15/2018    HGB 13.4 11/15/2018    HCT 41.7 11/15/2018    MCV 95 11/15/2018     11/15/2018     Lab Results   Component Value Date    CALCIUM 9.4 11/15/2018     No results for input(s): PT, INR, APTT in the last 24 hours.    A/P:  The patient is a 73 year old woman presenting for a colonoscopy.  1.  Colonoscopy - she can undergo a colonoscopy.  I have explained the risks, benefits, and alternatives of the procedure in detail.  The patient voices understanding and all questions have been answered.  The patient agrees to proceed as planned.

## 2018-12-12 ENCOUNTER — TELEPHONE (OUTPATIENT)
Dept: FAMILY MEDICINE | Facility: CLINIC | Age: 73
End: 2018-12-12

## 2018-12-12 RX ORDER — ROSUVASTATIN CALCIUM 20 MG/1
TABLET, COATED ORAL
Qty: 30 TABLET | Refills: 3 | Status: SHIPPED | OUTPATIENT
Start: 2018-12-12 | End: 2020-01-23 | Stop reason: SDUPTHER

## 2018-12-12 RX ORDER — ROSUVASTATIN CALCIUM 20 MG/1
TABLET, COATED ORAL
Qty: 30 TABLET | Refills: 3 | Status: SHIPPED | OUTPATIENT
Start: 2018-12-12 | End: 2018-12-12 | Stop reason: SDUPTHER

## 2018-12-12 NOTE — TELEPHONE ENCOUNTER
----- Message from Annamarie Ybarrajajasloan sent at 12/12/2018  2:34 PM CST -----  Contact: self -  [phone]  Refill request for --rosuvastatin (CRESTOR) 20 MG tablet.      ..  [facility] [place] [facility] [address] [address]  [address] [address] [address]  Phone: [phone] Fax: [phone]

## 2018-12-12 NOTE — TELEPHONE ENCOUNTER
----- Message from Laila Cantu sent at 12/12/2018  8:50 AM CST -----  Contact: Self   Patient is asking for something to be called in for a cough. Asked other pink dot symptoms patient says no to them all. [phone].    [facility]

## 2018-12-13 ENCOUNTER — INFUSION (OUTPATIENT)
Dept: INFUSION THERAPY | Facility: HOSPITAL | Age: 73
End: 2018-12-13
Attending: INTERNAL MEDICINE
Payer: MEDICARE

## 2018-12-13 DIAGNOSIS — C34.90 SQUAMOUS CELL CARCINOMA LUNG: Primary | ICD-10-CM

## 2018-12-13 PROCEDURE — 96523 IRRIG DRUG DELIVERY DEVICE: CPT | Mod: HCNC

## 2018-12-13 PROCEDURE — A4216 STERILE WATER/SALINE, 10 ML: HCPCS | Mod: HCNC | Performed by: INTERNAL MEDICINE

## 2018-12-13 PROCEDURE — 63600175 PHARM REV CODE 636 W HCPCS: Mod: HCNC | Performed by: INTERNAL MEDICINE

## 2018-12-13 PROCEDURE — 25000003 PHARM REV CODE 250: Mod: HCNC | Performed by: INTERNAL MEDICINE

## 2018-12-13 RX ORDER — BENZONATATE 100 MG/1
100 CAPSULE ORAL 3 TIMES DAILY PRN
Qty: 30 CAPSULE | Refills: 0 | Status: SHIPPED | OUTPATIENT
Start: 2018-12-13 | End: 2018-12-23

## 2018-12-13 RX ORDER — SODIUM CHLORIDE 0.9 % (FLUSH) 0.9 %
10 SYRINGE (ML) INJECTION
Status: DISCONTINUED | OUTPATIENT
Start: 2018-12-13 | End: 2018-12-13 | Stop reason: HOSPADM

## 2018-12-13 RX ORDER — SODIUM CHLORIDE 0.9 % (FLUSH) 0.9 %
10 SYRINGE (ML) INJECTION
Status: CANCELLED | OUTPATIENT
Start: 2018-12-13

## 2018-12-13 RX ORDER — HEPARIN 100 UNIT/ML
500 SYRINGE INTRAVENOUS
Status: CANCELLED | OUTPATIENT
Start: 2018-12-13

## 2018-12-13 RX ORDER — HEPARIN 100 UNIT/ML
500 SYRINGE INTRAVENOUS
Status: DISCONTINUED | OUTPATIENT
Start: 2018-12-13 | End: 2018-12-13 | Stop reason: HOSPADM

## 2018-12-13 RX ADMIN — HEPARIN 500 UNITS: 100 SYRINGE at 08:12

## 2018-12-13 RX ADMIN — Medication 10 ML: at 08:12

## 2018-12-28 ENCOUNTER — TELEPHONE (OUTPATIENT)
Dept: FAMILY MEDICINE | Facility: CLINIC | Age: 73
End: 2018-12-28

## 2018-12-28 DIAGNOSIS — K63.5 POLYP OF COLON, UNSPECIFIED PART OF COLON, UNSPECIFIED TYPE: ICD-10-CM

## 2018-12-28 DIAGNOSIS — Z12.11 COLON CANCER SCREENING: Primary | ICD-10-CM

## 2018-12-28 NOTE — TELEPHONE ENCOUNTER
----- Message from Amado Camejo sent at 12/28/2018 12:00 PM CST -----  Contact: Self/[phone]  The patient would like to speak to someone regarding a colonoscopy.        Thank you

## 2018-12-28 NOTE — TELEPHONE ENCOUNTER
"Patient requesting an order for a colonoscopy.  Stated she had one about 3 months ago, but it was not completed because she was not "cleaned out properly".  Please advise.   "

## 2019-01-03 ENCOUNTER — LAB VISIT (OUTPATIENT)
Dept: LAB | Facility: HOSPITAL | Age: 74
End: 2019-01-03
Attending: INTERNAL MEDICINE
Payer: MEDICARE

## 2019-01-03 ENCOUNTER — OFFICE VISIT (OUTPATIENT)
Dept: HEMATOLOGY/ONCOLOGY | Facility: CLINIC | Age: 74
End: 2019-01-03
Payer: MEDICARE

## 2019-01-03 VITALS
BODY MASS INDEX: 30.73 KG/M2 | DIASTOLIC BLOOD PRESSURE: 59 MMHG | HEIGHT: 62 IN | TEMPERATURE: 98 F | WEIGHT: 167 LBS | HEART RATE: 49 BPM | SYSTOLIC BLOOD PRESSURE: 103 MMHG | OXYGEN SATURATION: 95 %

## 2019-01-03 DIAGNOSIS — C34.81 MALIGNANT NEOPLASM OF OVERLAPPING SITES OF RIGHT BRONCHUS AND LUNG: Primary | ICD-10-CM

## 2019-01-03 DIAGNOSIS — J44.9 CHRONIC OBSTRUCTIVE PULMONARY DISEASE, UNSPECIFIED COPD TYPE: Chronic | ICD-10-CM

## 2019-01-03 DIAGNOSIS — Z12.11 COLON CANCER SCREENING: Primary | ICD-10-CM

## 2019-01-03 DIAGNOSIS — Z92.21 HISTORY OF CHEMOTHERAPY: ICD-10-CM

## 2019-01-03 DIAGNOSIS — R94.2 ABNORMAL PET OF LEFT LUNG: ICD-10-CM

## 2019-01-03 DIAGNOSIS — Z85.3 HISTORY OF BREAST CANCER: ICD-10-CM

## 2019-01-03 DIAGNOSIS — C34.90 SQUAMOUS CELL CARCINOMA OF LUNG, UNSPECIFIED LATERALITY: Chronic | ICD-10-CM

## 2019-01-03 LAB
ALBUMIN SERPL BCP-MCNC: 3.8 G/DL
ALP SERPL-CCNC: 47 U/L
ALT SERPL W/O P-5'-P-CCNC: 23 U/L
ANION GAP SERPL CALC-SCNC: 7 MMOL/L
AST SERPL-CCNC: 22 U/L
BASOPHILS # BLD AUTO: 0.04 K/UL
BASOPHILS NFR BLD: 1 %
BILIRUB SERPL-MCNC: 0.5 MG/DL
BUN SERPL-MCNC: 21 MG/DL
CALCIUM SERPL-MCNC: 9.3 MG/DL
CHLORIDE SERPL-SCNC: 105 MMOL/L
CO2 SERPL-SCNC: 27 MMOL/L
CREAT SERPL-MCNC: 0.8 MG/DL
DIFFERENTIAL METHOD: NORMAL
EOSINOPHIL # BLD AUTO: 0.1 K/UL
EOSINOPHIL NFR BLD: 2.4 %
ERYTHROCYTE [DISTWIDTH] IN BLOOD BY AUTOMATED COUNT: 14.3 %
EST. GFR  (AFRICAN AMERICAN): >60 ML/MIN/1.73 M^2
EST. GFR  (NON AFRICAN AMERICAN): >60 ML/MIN/1.73 M^2
GLUCOSE SERPL-MCNC: 127 MG/DL
HCT VFR BLD AUTO: 38.8 %
HGB BLD-MCNC: 12.7 G/DL
LYMPHOCYTES # BLD AUTO: 1.5 K/UL
LYMPHOCYTES NFR BLD: 35.7 %
MCH RBC QN AUTO: 31 PG
MCHC RBC AUTO-ENTMCNC: 32.7 G/DL
MCV RBC AUTO: 95 FL
MONOCYTES # BLD AUTO: 0.4 K/UL
MONOCYTES NFR BLD: 9.7 %
NEUTROPHILS # BLD AUTO: 2.1 K/UL
NEUTROPHILS NFR BLD: 51.2 %
PLATELET # BLD AUTO: 194 K/UL
PMV BLD AUTO: 9.3 FL
POTASSIUM SERPL-SCNC: 4.3 MMOL/L
PROT SERPL-MCNC: 6.6 G/DL
RBC # BLD AUTO: 4.1 M/UL
SODIUM SERPL-SCNC: 139 MMOL/L
WBC # BLD AUTO: 4.14 K/UL

## 2019-01-03 PROCEDURE — 80053 COMPREHEN METABOLIC PANEL: CPT | Mod: HCNC

## 2019-01-03 PROCEDURE — 99499 RISK ADDL DX/OHS AUDIT: ICD-10-PCS | Mod: HCNC,S$GLB,, | Performed by: INTERNAL MEDICINE

## 2019-01-03 PROCEDURE — 1101F PR PT FALLS ASSESS DOC 0-1 FALLS W/OUT INJ PAST YR: ICD-10-PCS | Mod: CPTII,HCNC,S$GLB, | Performed by: INTERNAL MEDICINE

## 2019-01-03 PROCEDURE — 99214 OFFICE O/P EST MOD 30 MIN: CPT | Mod: HCNC,S$GLB,, | Performed by: INTERNAL MEDICINE

## 2019-01-03 PROCEDURE — 1101F PT FALLS ASSESS-DOCD LE1/YR: CPT | Mod: CPTII,HCNC,S$GLB, | Performed by: INTERNAL MEDICINE

## 2019-01-03 PROCEDURE — 99999 PR PBB SHADOW E&M-EST. PATIENT-LVL IV: ICD-10-PCS | Mod: PBBFAC,HCNC,, | Performed by: INTERNAL MEDICINE

## 2019-01-03 PROCEDURE — 99214 PR OFFICE/OUTPT VISIT, EST, LEVL IV, 30-39 MIN: ICD-10-PCS | Mod: HCNC,S$GLB,, | Performed by: INTERNAL MEDICINE

## 2019-01-03 PROCEDURE — 99499 UNLISTED E&M SERVICE: CPT | Mod: HCNC,S$GLB,, | Performed by: INTERNAL MEDICINE

## 2019-01-03 PROCEDURE — 36415 COLL VENOUS BLD VENIPUNCTURE: CPT | Mod: HCNC

## 2019-01-03 PROCEDURE — 85025 COMPLETE CBC W/AUTO DIFF WBC: CPT | Mod: HCNC

## 2019-01-03 PROCEDURE — 99999 PR PBB SHADOW E&M-EST. PATIENT-LVL IV: CPT | Mod: PBBFAC,HCNC,, | Performed by: INTERNAL MEDICINE

## 2019-01-03 NOTE — PROGRESS NOTES
Subjective:       Patient ID: Ade Castellano is a 73 y.o. female.    Chief Complaint: Follow-up    HPI   Diagnosis: Stage IV cancer Squamous Cell CA lung     HPI       72 y/o female seen today for 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    s/p  cycle 6 of carboplatin/Abraxane 7/2/2018   She has  History of Stage 1A breast cancer Grade 3, ER/HI neg , Her 2 jose maria neg s/p lumpectomy 7/11/2014 and s/p adjuvant RT 9/2014 Pt declined Adjuvant chemo.       Pt hospitalized 11/2017   Ms. Castellano was admitted for acute on chronic respiratory failure requiring intubation and ventilation. Has large lung mass in right lung with probable post obstructive pneumonia resulting in COPD exacerbation. But also, patient had ran out of home O2 prior to onset of symptoms, likely contributing to presentation. Initiated on broad spectrum abx, IV steroids, duo-nebs and pulmonology consulted for ventilator co-management. Successfully extubated to BiPAP on 11/30. Then de-escalated to low flow nasal cannula. Abx tailored to augmentin for broad coverage, including anaerobic bacteria /     CTA chest 11/29/2017 reveals   Large right hilar mass with involvement of adjacent segmental pulmonary arterial branches and bronchi with associated postobstructive atelectasis and volume loss in the right middle lobe with adjacent ground glass opacity.  Findings highly concerning for underlying neoplastic process. Mediastinal and axillary adenopathy, with a right axillary lymph node measuring up to 2.0 cm in short axis diameter. No definite evidence of pulmonary thromboembolism.    She is followed by Pulm  She underwent rt axillary LN bx at outside facility- on 1/16/2018 [facility]  Pathology revealed metastatic poorly differentiated carcinoma of unknown primary site  TTF-1 negative, napsin negative  , cytokeratin 7 positive, cytokeratin 20 negative, p63 negative, cytokeratin 5/6 focal dim positivity    She next underwent lung bx at [facility]  l1/31/2017   Pathology revealed benign lung tissue    She underwent repeat Right Lung Bx 2/14/2018 Pathology reveals Squamous cell carcinoma  PD-L1 10% low expression  EGFR NEG  ALK NEG  BRAF pending  Outside slides axillary LN specimen  requested for review/comparison to lung bx findings     Pt hospitalized  6/12/2018 with severe anemia with Hb 4.9g/dl  as well as neutropenia with white blood cell count of 910, .    She underwent 3 units prbc with improvement in Hb to 12.3g/dl and discharged next day     She s/p  cycle 6 of carboplatin/Abraxane    PET/CT  4/19/2018 reveals there has been a excellent response to therapy.  At least 90% reduction in malignant activity.    Today, she  Is doing well  No fatigue  She reports less LOGAN   She no longer wears O2  Appetite and weight stable   She continues with occasional cough productive with clear sputum   No fatigue  No hemoptysis   Mild arthralgias- chronic,stable  No bleeding-urinary/rectal/nasal      PET/CT 9/28/2018  No definite evidence of recurrent neoplasm.  The new right axillary lymph node is likely benign/reactive and would be unlikely to be metastatic disease.  Left lung abnormality and right lung abnormalities are most likely post radiation or inflammatory change.        PrevHx:She had an abnormal mammogram 6/4/2014 which  Revealed a round solid mass 6mm in rt breast.  She then underwent U/S guided core bx of rt breast mass on 6/17/2014.  Pathology revealed infiltrating ductal carcinoma Grade 3 with tumor  Present in thin-walled spaces suggestive of lymphatic spaces. Hormone  Receptor status on tumor specimen revealed ER negative 0% ,  VT negative 0% and Her 2 jose maria negative.  She subsequently underwent rt segmental mastectomy and SLN bx  On 7/11/2014. Pathology of rt breast lumpectomy revealed invasive  Ductal carcinoma with micropapillary pattern ( Invasive micropapillary  Ca) with max tumor dimension 11.5mm with suggestion of tumor in   Thin walled  "spaces c/w lymphovascular involvement. Surgical  Margins free of tumor, grade 3, ER/MI neg , Her 2 jose maria neg   With [place] Lymph node negative for Neoplasm.   Pathologic staging [id number](i-)  Her mother was diagnosed with breast cancer in her 50's/  She has no family history of ovarian cancer.           Review of Systems   Constitutional: Negative for appetite change, fatigue, fever and unexpected weight change.   HENT: Negative for mouth sores and rhinorrhea.    Eyes: Negative for visual disturbance.   Respiratory: Positive for cough. Negative for shortness of breath.    Cardiovascular: Negative for chest pain.   Gastrointestinal: Negative for abdominal pain and diarrhea.   Genitourinary: Negative for frequency.   Musculoskeletal: Positive for arthralgias. Negative for back pain.   Skin: Negative for rash.   Neurological: Negative for dizziness and headaches.   Hematological: Negative for adenopathy.   Psychiatric/Behavioral: The patient is not nervous/anxious.        Objective:        Vitals:    01/03/19 0915 01/03/19 0919   BP: (!) 103/59    BP Location: Left arm    Patient Position: Sitting    BP Method: Large (Automatic)    Pulse: (!) 56 (!) 49   Temp: 98.4 °F (36.9 °C)    TempSrc: Oral    SpO2: 95%    Weight: 75.8 kg (167 lb)    Height: 5' 2" (1.575 m)          Physical Exam   Constitutional: She is oriented to person, place, and time. She appears well-developed and well-nourished.   HENT:   Head: Normocephalic.   Mouth/Throat: Oropharynx is clear and moist. No oropharyngeal exudate.   Eyes: Conjunctivae and lids are normal. Pupils are equal, round, and reactive to light. No scleral icterus.   Neck: Normal range of motion. Neck supple. No thyromegaly present.   Cardiovascular: Normal rate, regular rhythm and normal heart sounds.   No murmur heard.  Pulmonary/Chest: Effort normal and breath sounds normal. She has no wheezes. Right breast exhibits no inverted nipple, no mass, no nipple discharge and no skin " change. Left breast exhibits no inverted nipple, no mass, no nipple discharge and no skin change.   Abdominal: Soft. Bowel sounds are normal. She exhibits mass. There is no hepatosplenomegaly. There is no tenderness. There is no rebound and no guarding.   Musculoskeletal: Normal range of motion. She exhibits no edema or tenderness.   Lymphadenopathy:     She has no cervical adenopathy.     She has no axillary adenopathy.        Right: No supraclavicular adenopathy present.        Left: No supraclavicular adenopathy present.   Neurological: She is alert and oriented to person, place, and time. No cranial nerve deficit. Coordination normal.   Skin: Skin is warm and dry. No ecchymosis, no petechiae and no rash noted. No erythema.   Psychiatric: She has a normal mood and affect.             CT a/p w/contrast 11/29/2018   1. No acute abnormality identified within the abdomen and pelvis.    2.  There are a few nonspecific prominent lymph nodes in the upper abdomen including a periesophageal lymph node which measures 1.0 cm in short axis diameter.    3.  Significant abdominal aortic atherosclerosis and abdominal aorta ectasia.    4.  Additional findings as above.    PET/CT 1/26/2018   1.  Intense FDG uptake within the known right infrahilar lung mass, compatible with malignancy.  It is unclear if this represents primary lung malignancy or metastatic breast cancer.    2.  Intensely hypermetabolic right axillary, retropectoral, and lower right cervical lymph nodes, compatible with metastases.    3.  Abnormal FDG uptake along right breast skin thickening, new from prior chest CTA and mammogram.  This may relate to localized edema/inflammation, though correlation with physical exam and mammography are recommended to exclude underlying inflammatory carcinoma.      MRI Brain w w/out contrast 2/9/2018  1.  No evidence of intracranial metastases.  2.  Sinus disease       Rt axillary LN bx at outside facility- on 1/16/2018 at [facility]  Pathology revealed metastatic poorly differentiated carcinoma of unknown primary  site [facility]16/[facility]  TTF-1 negative, napsin negative  , cytokeratin 7 positive, cytokeratin 20 negative, p63 negative, cytokeratin 5/6 focal dim positivity    LUNG BIOPSY 1/31/2017   Pathology revealed benign lung tissue         SPECIMEN  1) Right Lung Bx 2/14/2018  Supplemental Diagnosis  Immunohistochemical stains show strong nuclear staining for p63 in essentially all tumor cells and very strong  cytoplasmic and membrane staining for CK5/6, also in essentially all tumor cells. TTF-1 and CK7 are negative  within tumor cells but do stain native pulmonary elements present within the biopsy. A stain for mucicarmine is  negative. Positive and negative controls function appropriately.  Final diagnosis: Specimen submitted as right lung biopsy  -Squamous cell carcinoma  (Electronically Signed: 2018-02-20 09:12:07 )  Diagnosed by: Phong Thompson  FINAL PATHOLOGIC DIAGNOSIS  Fragments of pulmonary parenchyma (submitted as right lung biopsy):    FINAL PATHOLOGIC DIAGNOSIS 1. Lymph node, right axilla, biopsy, review of 10 outside slides [facility], JS [phone],  collected January 11, 2018: Metastatic poorly differentiated carcinoma (see comment).  The histologic section shows fibrous stroma infiltrated by nest of poorly differentiated malignant cells including  occasional dyskeratotic cells morphologically suggestive of metastatic squamous cell carcinoma.  Immunohistochemical stains are nonspecific; the cells are positive for cytokeratin 7 and cytokeratin 5/6 (focal) and  negative for cytokeratin 20, TTF-1, p63, GCDFP, mammoglobin, and CEA        PET/CT 9/28/2018  No definite evidence of recurrent neoplasm.  The new right axillary lymph node is likely benign/reactive and would be unlikely to be metastatic disease.    Left lung abnormality and right lung abnormalities are most likely  post radiation or inflammatory change.      Assessment:       1. Malignant neoplasm of overlapping sites of right bronchus and lung     2. Abnormal PET of left lung    3. Chronic obstructive pulmonary disease, unspecified COPD type    4. History of breast cancer    5. History of chemotherapy        Plan:   ECOG 0  1-5  Pt with widely metastatic squamous cell CA lung     Pt with hx of Stage 1A invasive ductal carcinoma rt breast s/p rt segmental mastectomy and SLN bx 7/11/2014 ER/OH neg HER 2 jose maria neg [id number](i-).  S/p adjuvant RT completed 9/2014 Pt declined adjuvant chemo  Follow-up Mammo 6/12/2017 No mammographic evidence of malignancy.  Pt  hospitalized 11/2017 with acute-on-chronic  resp failure  Abnormal CT imaging revealing Large right hilar mass with involvement of  adjacent segmental pulmonary arterial branches and bronchi with associated postobstructive atelectasis  and involvement of mediastinal and axillary adenopathy   S/p rt axillary LN bx ( outside facility) - metastatic poorly differentiated carcinoma of unknown primary  site  status post  lung biopsy 1/31/2017 -benign lung tissue  PET/CT 1/26/2018   Intense FDG uptake within the known right infrahilar lung mass, compatible with malignancy.   Intensely hypermetabolic right axillary, retropectoral, and lower right cervical lymph nodes, compatible with metastases.  Abnormal FDG uptake along right breast skin thickening, new from prior chest CTA and mammogram.    Repeat lung bx reveals Squamous Cell CA lung  PD-L1 10% low expression  EGFR NEG  ALK NEG      She s/p  cycle 6 of carboplatin/Abraxane Day1 completed 7/2/2018 ( day 15 held due to prolonged cytopenias)    PET/CT 9/28/2018  No definite evidence of recurrent neoplasm.  The new right axillary lymph node is likely benign/reactive and would be unlikely to be metastatic disease.  Left lung abnormality and right lung abnormalities are most likely post radiation or inflammatory change.    Plan PET/CT  prior to f/u     Follow-up with Pulmonology   Follow-up   6 weeks  mo with cbc,cmp prior to f/u   Cont PAC flush prn    Cc: Swetha Katz M.D.         MD Tory Roberson MD

## 2019-01-14 ENCOUNTER — INFUSION (OUTPATIENT)
Dept: INFUSION THERAPY | Facility: HOSPITAL | Age: 74
End: 2019-01-14
Attending: INTERNAL MEDICINE
Payer: MEDICARE

## 2019-01-14 DIAGNOSIS — C34.90 SQUAMOUS CELL CARCINOMA LUNG: Primary | ICD-10-CM

## 2019-01-14 PROCEDURE — 96523 IRRIG DRUG DELIVERY DEVICE: CPT | Mod: HCNC

## 2019-01-14 PROCEDURE — 63600175 PHARM REV CODE 636 W HCPCS: Mod: HCNC | Performed by: INTERNAL MEDICINE

## 2019-01-14 PROCEDURE — 25000003 PHARM REV CODE 250: Mod: HCNC | Performed by: INTERNAL MEDICINE

## 2019-01-14 PROCEDURE — A4216 STERILE WATER/SALINE, 10 ML: HCPCS | Mod: HCNC | Performed by: INTERNAL MEDICINE

## 2019-01-14 RX ORDER — HEPARIN 100 UNIT/ML
500 SYRINGE INTRAVENOUS
Status: DISCONTINUED | OUTPATIENT
Start: 2019-01-14 | End: 2019-01-14 | Stop reason: HOSPADM

## 2019-01-14 RX ORDER — HEPARIN 100 UNIT/ML
500 SYRINGE INTRAVENOUS
Status: CANCELLED | OUTPATIENT
Start: 2019-01-14

## 2019-01-14 RX ORDER — SODIUM CHLORIDE 0.9 % (FLUSH) 0.9 %
10 SYRINGE (ML) INJECTION
Status: DISCONTINUED | OUTPATIENT
Start: 2019-01-14 | End: 2019-01-14 | Stop reason: HOSPADM

## 2019-01-14 RX ORDER — SODIUM CHLORIDE 0.9 % (FLUSH) 0.9 %
10 SYRINGE (ML) INJECTION
Status: CANCELLED | OUTPATIENT
Start: 2019-01-14

## 2019-01-14 RX ADMIN — HEPARIN 500 UNITS: 100 SYRINGE at 08:01

## 2019-01-14 RX ADMIN — Medication 10 ML: at 08:01

## 2019-01-14 NOTE — PLAN OF CARE
Problem: Adult Inpatient Plan of Care  Goal: Plan of Care Review  Outcome: Ongoing (interventions implemented as appropriate)  Tolerated port flush. Pt has next appt. Pt discharged, ambulatory off unit.

## 2019-01-21 ENCOUNTER — OFFICE VISIT (OUTPATIENT)
Dept: FAMILY MEDICINE | Facility: CLINIC | Age: 74
End: 2019-01-21
Payer: MEDICARE

## 2019-01-21 VITALS
WEIGHT: 179.44 LBS | BODY MASS INDEX: 33.02 KG/M2 | OXYGEN SATURATION: 95 % | SYSTOLIC BLOOD PRESSURE: 110 MMHG | DIASTOLIC BLOOD PRESSURE: 68 MMHG | HEIGHT: 62 IN | HEART RATE: 64 BPM

## 2019-01-21 DIAGNOSIS — J44.1 ACUTE EXACERBATION OF CHRONIC OBSTRUCTIVE PULMONARY DISEASE (COPD): ICD-10-CM

## 2019-01-21 DIAGNOSIS — I70.0 ATHEROSCLEROSIS OF AORTA: Primary | ICD-10-CM

## 2019-01-21 PROBLEM — T78.40XA ALLERGIC REACTION: Status: RESOLVED | Noted: 2018-05-07 | Resolved: 2019-01-21

## 2019-01-21 PROCEDURE — 1101F PT FALLS ASSESS-DOCD LE1/YR: CPT | Mod: CPTII,HCNC,S$GLB, | Performed by: FAMILY MEDICINE

## 2019-01-21 PROCEDURE — 99499 RISK ADDL DX/OHS AUDIT: ICD-10-PCS | Mod: HCNC,S$GLB,, | Performed by: FAMILY MEDICINE

## 2019-01-21 PROCEDURE — 1101F PR PT FALLS ASSESS DOC 0-1 FALLS W/OUT INJ PAST YR: ICD-10-PCS | Mod: CPTII,HCNC,S$GLB, | Performed by: FAMILY MEDICINE

## 2019-01-21 PROCEDURE — 99214 OFFICE O/P EST MOD 30 MIN: CPT | Mod: HCNC,S$GLB,, | Performed by: FAMILY MEDICINE

## 2019-01-21 PROCEDURE — 99999 PR PBB SHADOW E&M-EST. PATIENT-LVL III: CPT | Mod: PBBFAC,HCNC,, | Performed by: FAMILY MEDICINE

## 2019-01-21 PROCEDURE — 99999 PR PBB SHADOW E&M-EST. PATIENT-LVL III: ICD-10-PCS | Mod: PBBFAC,HCNC,, | Performed by: FAMILY MEDICINE

## 2019-01-21 PROCEDURE — 99499 UNLISTED E&M SERVICE: CPT | Mod: HCNC,S$GLB,, | Performed by: FAMILY MEDICINE

## 2019-01-21 PROCEDURE — 99214 PR OFFICE/OUTPT VISIT, EST, LEVL IV, 30-39 MIN: ICD-10-PCS | Mod: HCNC,S$GLB,, | Performed by: FAMILY MEDICINE

## 2019-01-21 RX ORDER — CEPHALEXIN 500 MG/1
500 CAPSULE ORAL EVERY 12 HOURS
Qty: 14 CAPSULE | Refills: 0 | Status: ON HOLD | OUTPATIENT
Start: 2019-01-21 | End: 2019-01-29 | Stop reason: HOSPADM

## 2019-01-21 RX ORDER — CODEINE PHOSPHATE AND GUAIFENESIN 10; 100 MG/5ML; MG/5ML
5 SOLUTION ORAL EVERY 8 HOURS PRN
Qty: 118 ML | Refills: 0 | Status: SHIPPED | OUTPATIENT
Start: 2019-01-21 | End: 2019-01-31

## 2019-01-21 NOTE — PROGRESS NOTES
Subjective:       Patient ID: Ade Castellano is a 73 y.o. female.    Chief Complaint: Cough    73 year old female presents with a cough for a few months.s he has had this for several months. She states in the beginning it was a loose clear, cough. She now has a dry cough. She has no fever or chills. She has no wheezing or chest tightness. She hasn't used October in 3 months. She has not had any aid reflux.she states she has post nasal drip. She took robitussin and tessalon perles, but neither helped.         Past Medical History:  11/29/2017: Acute respiratory failure with hypoxia and hypercapnia  No date: Angina pectoris  No date: Arthritis  No date: Bell's palsy      Comment:  left facial weakness  No date: Breast cancer      Comment:  RIGHT  No date: CAD (coronary artery disease)  No date: Cervical cancer  No date: Chronic bronchitis  No date: COPD (chronic obstructive pulmonary disease)      Comment:  Dr. Katz  No date: Dental bridge present  No date: Emphysema of lung  06/2017: H/O colonoscopy      Comment:  due for repeat colonsocopy in 6/2018  No date: History of heart artery stent      Comment:  Dr. Ortiz  x2 stents  No date: Hyperlipidemia  No date: Hypertension  No date: Lung cancer  No date: Myocardial infarction  No date: SUNITHA (obstructive sleep apnea)      Comment:  intolerant to mask  No date: Pneumonia  No date: Pneumonia due to other staphylococcus  No date: PUD (peptic ulcer disease)  6/11/2018: Severe anemia  No date: Sleep apnea  No date: Vaginal delivery      Comment:  x1   Past Surgical History:  No date: ADENOIDECTOMY  2/14/2018: [id number]; N/A      Comment:  Performed by [facility] Provider at [facility] OR  7/11/2014: BIOPSY-SENTINEL NODE ([id number]); Right      Comment:  Performed by Kameron Newberry MD at [facility] OR  6/17/2014: BIOPSY-ULTRASOUND GUIDED CORE; Right      Comment:  Performed by [facility] Diagnostic Provider at [facility] OR  No date: BREAST BIOPSY; Right      Comment:  x3  No date:  BREAST SURGERY      Comment:  lumpectomy right side   No date: CERVIX SURGERY      Comment:  cone  11/28/2018: COLONOSCOPY; N/A      Comment:  Performed by Nura Urbina MD at [facility] ENDO  6/30/2017: COLONOSCOPY; N/A      Comment:  Performed by Julio Rudd MD at [facility] ENDO  3/17/2017: COLONOSCOPY; N/A      Comment:  Performed by Julio Rudd MD at [facility] ENDO  10/11/2017: ESOPHAGOGASTRODUODENOSCOPY (EGD); N/A      Comment:  Performed by Julio Rudd MD at [facility] ENDO  06/08/2018: EYE SURGERY; Left  3/7/2018: [id number]; N/A      Comment:  Performed by [facility] Diagnostic Provider at [facility] OR  7/11/2014: LUMPECTOMY RIGHT BREAST S/P NEEDLE LOCALIZATION ; Right      Comment:  Performed by Kameron Newberry MD at [facility] OR  01/2018: PORTACATH PLACEMENT; Right  No date: sweat glands axillary regions  No date: TONSILLECTOMY  Review of patient's family history indicates:  Problem: Cancer      Relation: Mother          Age of Onset: (Not Specified)          Comment: breast  Problem: Heart disease      Relation: Mother          Age of Onset: (Not Specified)  Problem: Breast cancer      Relation: Mother          Age of Onset: (Not Specified)  Problem: Cancer      Relation: Father          Age of Onset: (Not Specified)          Comment: lung-smoker   Problem: Cancer      Relation: Sister          Age of Onset: (Not Specified)          Comment: lung-smoker   Problem: Heart attack      Relation: Sister          Age of Onset: (Not Specified)  Problem: Cancer      Relation: Maternal Grandmother          Age of Onset: (Not Specified)  Problem: Heart disease      Relation: Maternal Grandmother          Age of Onset: (Not Specified)  Problem: Cancer      Relation: Maternal Grandfather          Age of Onset: (Not Specified)  Problem: Heart disease      Relation: Maternal Grandfather          Age of Onset: (Not Specified)  Problem: Cancer      Relation: Paternal Grandmother          Age of Onset: (Not Specified)  Problem: Cancer       "Relation: Paternal Grandfather          Age of Onset: (Not Specified)  Problem: Cancer      Relation: Sister          Age of Onset: (Not Specified)          Comment: mets not sure where it started     Social History    Socioeconomic History      Marital status: Single      Spouse name: Not on file      Number of children: Not on file      Years of education: Not on file      Highest education level: Not on file    Social Needs      Financial resource strain: Not on file      Food insecurity - worry: Not on file      Food insecurity - inability: Not on file      Transportation needs - medical: Not on file      Transportation needs - non-medical: Not on file    Occupational History      Not on file    Tobacco Use      Smoking status: Former Smoker        Packs/day: 0.25        Years: 50.00        Pack years: 12.5        Types: Cigarettes        Quit date: 2009        Years since quittin.4      Smokeless tobacco: Never Used    Substance and Sexual Activity      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 Negative for chest tightness, shortness of breath and wheezing.    Cardiovascular: Negative for chest pain, palpitations and leg swelling.       Objective:       Vitals:    19 1357   BP: 110/68   Pulse: 64   SpO2: 95%   Weight: 81.4 kg (179 lb 7.3 oz)   Height: 5' 2" (1.575 m)       Physical Exam   Constitutional: She is oriented to person, place, and time. She appears well-developed and well-nourished. No distress.   HENT:   Head: Normocephalic and atraumatic.   Neck: Normal range of motion. Neck supple.   Cardiovascular: Normal rate, regular rhythm and normal heart sounds. Exam reveals no gallop and no friction rub.   No murmur heard.  Pulmonary/Chest: Effort normal and breath sounds normal. No " stridor. No respiratory distress. She has no wheezes. She has no rales.   Neurological: She is alert and oriented to person, place, and time.   Skin: She is not diaphoretic.   Psychiatric: She has a normal mood and affect.       Assessment:       1. Atherosclerosis of aorta    2. Acute exacerbation of chronic obstructive pulmonary disease (COPD)        Plan:       Ade was seen today for cough.    Diagnoses and all orders for this visit:    Atherosclerosis of aorta    Acute exacerbation of chronic obstructive pulmonary disease (COPD)  -     guaifenesin-codeine 100-10 mg/5 ml (CHERATUSSIN AC)  mg/5 mL syrup; Take 5 mLs by mouth every 8 (eight) hours as needed for Cough.  -     cephALEXin (KEFLEX) 500 MG capsule; Take 1 capsule (500 mg total) by mouth every 12 (twelve) hours. for 7 days  Take 1 tablet BID, but the day of her dental surgery she will take 2 prior to the procedure.

## 2019-01-21 NOTE — H&P (VIEW-ONLY)
Subjective:       Patient ID: Ade Castellano is a 73 y.o. female.    Chief Complaint: Cough    73 year old female presents with a cough for a few months.s he has had this for several months. She states in the beginning it was a loose clear, cough. She now has a dry cough. She has no fever or chills. She has no wheezing or chest tightness. She hasn't used October in 3 months. She has not had any aid reflux.she states she has post nasal drip. She took robitussin and tessalon perles, but neither helped.         Past Medical History:  11/29/2017: Acute respiratory failure with hypoxia and hypercapnia  No date: Angina pectoris  No date: Arthritis  No date: Bell's palsy      Comment:  left facial weakness  No date: Breast cancer      Comment:  RIGHT  No date: CAD (coronary artery disease)  No date: Cervical cancer  No date: Chronic bronchitis  No date: COPD (chronic obstructive pulmonary disease)      Comment:  Dr. Katz  No date: Dental bridge present  No date: Emphysema of lung  06/2017: H/O colonoscopy      Comment:  due for repeat colonsocopy in 6/2018  No date: History of heart artery stent      Comment:  Dr. Ortiz  x2 stents  No date: Hyperlipidemia  No date: Hypertension  No date: Lung cancer  No date: Myocardial infarction  No date: SUNITHA (obstructive sleep apnea)      Comment:  intolerant to mask  No date: Pneumonia  No date: Pneumonia due to other staphylococcus  No date: PUD (peptic ulcer disease)  6/11/2018: Severe anemia  No date: Sleep apnea  No date: Vaginal delivery      Comment:  x1   Past Surgical History:  No date: ADENOIDECTOMY  2/14/2018: [id number]; N/A      Comment:  Performed by [facility] Diagnostic Provider at [facility] OR  7/11/2014: BIOPSY-SENTINEL NODE ([id number]); Right      Comment:  Performed by Kameron Newberry MD at [facility] OR  6/17/2014: BIOPSY-ULTRASOUND GUIDED CORE; Right      Comment:  Performed by [facility] Diagnostic Provider at [facility] OR  No date: BREAST BIOPSY; Right      Comment:  x3  No date:  BREAST SURGERY      Comment:  lumpectomy right side   No date: CERVIX SURGERY      Comment:  cone  11/28/2018: COLONOSCOPY; N/A      Comment:  Performed by Nura Urbina MD at [facility] ENDO  6/30/2017: COLONOSCOPY; N/A      Comment:  Performed by Julio Rudd MD at [facility] ENDO  3/17/2017: COLONOSCOPY; N/A      Comment:  Performed by Julio Rudd MD at [facility] ENDO  10/11/2017: ESOPHAGOGASTRODUODENOSCOPY (EGD); N/A      Comment:  Performed by Julio Rudd MD at [facility] ENDO  06/08/2018: EYE SURGERY; Left  3/7/2018: [id number]; N/A      Comment:  Performed by [facility] Diagnostic Provider at [facility] OR  7/11/2014: LUMPECTOMY RIGHT BREAST S/P NEEDLE LOCALIZATION ; Right      Comment:  Performed by Kameron Newberry MD at [facility] OR  01/2018: PORTACATH PLACEMENT; Right  No date: sweat glands axillary regions  No date: TONSILLECTOMY  Review of patient's family history indicates:  Problem: Cancer      Relation: Mother          Age of Onset: (Not Specified)          Comment: breast  Problem: Heart disease      Relation: Mother          Age of Onset: (Not Specified)  Problem: Breast cancer      Relation: Mother          Age of Onset: (Not Specified)  Problem: Cancer      Relation: Father          Age of Onset: (Not Specified)          Comment: lung-smoker   Problem: Cancer      Relation: Sister          Age of Onset: (Not Specified)          Comment: lung-smoker   Problem: Heart attack      Relation: Sister          Age of Onset: (Not Specified)  Problem: Cancer      Relation: Maternal Grandmother          Age of Onset: (Not Specified)  Problem: Heart disease      Relation: Maternal Grandmother          Age of Onset: (Not Specified)  Problem: Cancer      Relation: Maternal Grandfather          Age of Onset: (Not Specified)  Problem: Heart disease      Relation: Maternal Grandfather          Age of Onset: (Not Specified)  Problem: Cancer      Relation: Paternal Grandmother          Age of Onset: (Not Specified)  Problem: Cancer       "Relation: Paternal Grandfather          Age of Onset: (Not Specified)  Problem: Cancer      Relation: Sister          Age of Onset: (Not Specified)          Comment: mets not sure where it started     Social History    Socioeconomic History      Marital status: Single      Spouse name: Not on file      Number of children: Not on file      Years of education: Not on file      Highest education level: Not on file    Social Needs      Financial resource strain: Not on file      Food insecurity - worry: Not on file      Food insecurity - inability: Not on file      Transportation needs - medical: Not on file      Transportation needs - non-medical: Not on file    Occupational History      Not on file    Tobacco Use      Smoking status: Former Smoker        Packs/day: 0.25        Years: 50.00        Pack years: 12.5        Types: Cigarettes        Quit date: 2009        Years since quittin.4      Smokeless tobacco: Never Used    Substance and Sexual Activity      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 Negative for chest tightness, shortness of breath and wheezing.    Cardiovascular: Negative for chest pain, palpitations and leg swelling.       Objective:       Vitals:    19 1357   BP: 110/68   Pulse: 64   SpO2: 95%   Weight: 81.4 kg (179 lb 7.3 oz)   Height: 5' 2" (1.575 m)       Physical Exam   Constitutional: She is oriented to person, place, and time. She appears well-developed and well-nourished. No distress.   HENT:   Head: Normocephalic and atraumatic.   Neck: Normal range of motion. Neck supple.   Cardiovascular: Normal rate, regular rhythm and normal heart sounds. Exam reveals no gallop and no friction rub.   No murmur heard.  Pulmonary/Chest: Effort normal and breath sounds normal. No " stridor. No respiratory distress. She has no wheezes. She has no rales.   Neurological: She is alert and oriented to person, place, and time.   Skin: She is not diaphoretic.   Psychiatric: She has a normal mood and affect.       Assessment:       1. Atherosclerosis of aorta    2. Acute exacerbation of chronic obstructive pulmonary disease (COPD)        Plan:       Ade was seen today for cough.    Diagnoses and all orders for this visit:    Atherosclerosis of aorta    Acute exacerbation of chronic obstructive pulmonary disease (COPD)  -     guaifenesin-codeine 100-10 mg/5 ml (CHERATUSSIN AC)  mg/5 mL syrup; Take 5 mLs by mouth every 8 (eight) hours as needed for Cough.  -     cephALEXin (KEFLEX) 500 MG capsule; Take 1 capsule (500 mg total) by mouth every 12 (twelve) hours. for 7 days  Take 1 tablet BID, but the day of her dental surgery she will take 2 prior to the procedure.

## 2019-01-23 ENCOUNTER — PES CALL (OUTPATIENT)
Dept: ADMINISTRATIVE | Facility: CLINIC | Age: 74
End: 2019-01-23

## 2019-01-29 ENCOUNTER — HOSPITAL ENCOUNTER (OUTPATIENT)
Facility: HOSPITAL | Age: 74
Discharge: HOME | End: 2019-01-29
Attending: INTERNAL MEDICINE | Admitting: INTERNAL MEDICINE
Payer: MEDICARE

## 2019-01-29 ENCOUNTER — ANESTHESIA (OUTPATIENT)
Dept: ENDOSCOPY | Facility: HOSPITAL | Age: 74
End: 2019-01-29
Payer: MEDICARE

## 2019-01-29 ENCOUNTER — ANESTHESIA EVENT (OUTPATIENT)
Dept: ENDOSCOPY | Facility: HOSPITAL | Age: 74
End: 2019-01-29
Payer: MEDICARE

## 2019-01-29 VITALS
RESPIRATION RATE: 18 BRPM | TEMPERATURE: 98 F | DIASTOLIC BLOOD PRESSURE: 64 MMHG | BODY MASS INDEX: 32.94 KG/M2 | HEART RATE: 54 BPM | SYSTOLIC BLOOD PRESSURE: 132 MMHG | WEIGHT: 179 LBS | HEIGHT: 62 IN | OXYGEN SATURATION: 100 %

## 2019-01-29 DIAGNOSIS — Z12.11 SCREEN FOR COLON CANCER: ICD-10-CM

## 2019-01-29 PROCEDURE — G0105 COLORECTAL SCRN; HI RISK IND: HCPCS | Mod: HCNC,,, | Performed by: INTERNAL MEDICINE

## 2019-01-29 PROCEDURE — G0121 COLON CA SCRN NOT HI RSK IND: HCPCS | Mod: HCNC | Performed by: INTERNAL MEDICINE

## 2019-01-29 PROCEDURE — 37000009 HC ANESTHESIA EA ADD 15 MINS: Mod: HCNC | Performed by: INTERNAL MEDICINE

## 2019-01-29 PROCEDURE — 63600175 PHARM REV CODE 636 W HCPCS: Mod: HCNC | Performed by: NURSE ANESTHETIST, CERTIFIED REGISTERED

## 2019-01-29 PROCEDURE — G0105 COLORECTAL SCRN; HI RISK IND: ICD-10-PCS | Mod: HCNC,,, | Performed by: INTERNAL MEDICINE

## 2019-01-29 PROCEDURE — 25000003 PHARM REV CODE 250: Mod: HCNC | Performed by: ANESTHESIOLOGY

## 2019-01-29 PROCEDURE — D9220A PRA ANESTHESIA: Mod: HCNC,ANES,, | Performed by: ANESTHESIOLOGY

## 2019-01-29 PROCEDURE — 37000008 HC ANESTHESIA 1ST 15 MINUTES: Mod: HCNC | Performed by: INTERNAL MEDICINE

## 2019-01-29 PROCEDURE — 45378 DIAGNOSTIC COLONOSCOPY: CPT | Performed by: INTERNAL MEDICINE

## 2019-01-29 PROCEDURE — D9220A PRA ANESTHESIA: ICD-10-PCS | Mod: HCNC,ANES,, | Performed by: ANESTHESIOLOGY

## 2019-01-29 PROCEDURE — 25000003 PHARM REV CODE 250: Mod: HCNC | Performed by: NURSE ANESTHETIST, CERTIFIED REGISTERED

## 2019-01-29 RX ORDER — LIDOCAINE HCL/PF 100 MG/5ML
SYRINGE (ML) INTRAVENOUS
Status: DISCONTINUED | OUTPATIENT
Start: 2019-01-29 | End: 2019-01-29

## 2019-01-29 RX ORDER — GLYCOPYRROLATE 0.2 MG/ML
INJECTION INTRAMUSCULAR; INTRAVENOUS
Status: DISPENSED
Start: 2019-01-29 | End: 2019-01-30

## 2019-01-29 RX ORDER — PROPOFOL 10 MG/ML
VIAL (ML) INTRAVENOUS
Status: DISCONTINUED
Start: 2019-01-29 | End: 2019-01-29 | Stop reason: HOSPADM

## 2019-01-29 RX ORDER — HYDROMORPHONE HYDROCHLORIDE 2 MG/ML
0.2 INJECTION, SOLUTION INTRAMUSCULAR; INTRAVENOUS; SUBCUTANEOUS EVERY 5 MIN PRN
Status: CANCELLED | OUTPATIENT
Start: 2019-01-29

## 2019-01-29 RX ORDER — PROPOFOL 10 MG/ML
VIAL (ML) INTRAVENOUS
Status: DISCONTINUED | OUTPATIENT
Start: 2019-01-29 | End: 2019-01-29

## 2019-01-29 RX ORDER — MEPERIDINE HYDROCHLORIDE 50 MG/ML
12.5 INJECTION INTRAMUSCULAR; INTRAVENOUS; SUBCUTANEOUS ONCE AS NEEDED
Status: CANCELLED | OUTPATIENT
Start: 2019-01-29 | End: 2019-01-30

## 2019-01-29 RX ORDER — GLYCOPYRROLATE 0.2 MG/ML
INJECTION INTRAMUSCULAR; INTRAVENOUS
Status: DISCONTINUED | OUTPATIENT
Start: 2019-01-29 | End: 2019-01-29

## 2019-01-29 RX ORDER — SODIUM CHLORIDE 9 MG/ML
INJECTION, SOLUTION INTRAVENOUS CONTINUOUS
Status: DISCONTINUED | OUTPATIENT
Start: 2019-01-29 | End: 2019-01-29 | Stop reason: HOSPADM

## 2019-01-29 RX ORDER — LIDOCAINE HYDROCHLORIDE 20 MG/ML
INJECTION, SOLUTION EPIDURAL; INFILTRATION; INTRACAUDAL; PERINEURAL
Status: DISCONTINUED
Start: 2019-01-29 | End: 2019-01-29 | Stop reason: HOSPADM

## 2019-01-29 RX ORDER — SODIUM CHLORIDE 0.9 % (FLUSH) 0.9 %
3 SYRINGE (ML) INJECTION
Status: CANCELLED | OUTPATIENT
Start: 2019-01-29

## 2019-01-29 RX ADMIN — PROPOFOL 20 MG: 10 INJECTION, EMULSION INTRAVENOUS at 08:01

## 2019-01-29 RX ADMIN — PROPOFOL 50 MG: 10 INJECTION, EMULSION INTRAVENOUS at 08:01

## 2019-01-29 RX ADMIN — PROPOFOL 40 MG: 10 INJECTION, EMULSION INTRAVENOUS at 08:01

## 2019-01-29 RX ADMIN — SODIUM CHLORIDE: 0.9 INJECTION, SOLUTION INTRAVENOUS at 07:01

## 2019-01-29 RX ADMIN — PROPOFOL 30 MG: 10 INJECTION, EMULSION INTRAVENOUS at 08:01

## 2019-01-29 RX ADMIN — GLYCOPYRROLATE 0.2 MG: 0.2 INJECTION, SOLUTION INTRAMUSCULAR; INTRAVENOUS at 08:01

## 2019-01-29 RX ADMIN — LIDOCAINE HYDROCHLORIDE 80 MG: 20 INJECTION, SOLUTION INTRAVENOUS at 08:01

## 2019-01-29 NOTE — [employer] PATIENT INSTRUCTIONS
Discharge Summary/Instructions after an Endoscopic Procedure  Patient Name: Ade Castellano  Patient MRN: [id number]  Patient YOB: 1945  Tuesday, January 29, 2019  Emmanuel Perez MD  RESTRICTIONS:  During your procedure today, you received medications for sedation.  These   medications may affect your judgment, balance and coordination.  Therefore,   for 24 hours, you have the following restrictions:   - DO NOT drive a car, operate machinery, make legal/financial decisions,   sign important papers or drink alcohol.    ACTIVITY:  Today: no heavy lifting, straining or running due to procedural   sedation/anesthesia.  The following day: return to full activity including work.  DIET:  Eat and drink normally unless instructed otherwise.     TREATMENT FOR COMMON SIDE EFFECTS:  - Mild abdominal pain, nausea, belching, bloating or excessive gas:  rest,   eat lightly and use a heating pad.  - Sore Throat: treat with throat lozenges and/or gargle with warm salt   water.  - Because air was used during the procedure, expelling large amounts of air   from your rectum or belching is normal.  - If a bowel prep was taken, you may not have a bowel movement for 1-3 days.    This is normal.  SYMPTOMS TO WATCH FOR AND REPORT TO YOUR PHYSICIAN:  1. Abdominal pain or bloating, other than gas cramps.  2. Chest pain.  3. Back pain.  4. Signs of infection such as: chills or fever occurring within 24 hours   after the procedure.  5. Rectal bleeding, which would show as bright red, maroon, or black stools.   (A tablespoon of blood from the rectum is not serious, especially if   hemorrhoids are present.)  6. Vomiting.  7. Weakness or dizziness.  GO DIRECTLY TO THE NEAREST EMERGENCY ROOM IF YOU HAVE ANY OF THE FOLLOWING:      Difficulty breathing              Chills and/or fever over 101 F   Persistent vomiting and/or vomiting blood   Severe abdominal pain   Severe chest pain   Black, tarry stools   Bleeding- more than one  tablespoon   Any other symptom or condition that you feel may need urgent attention  Your doctor recommends these additional instructions:  If any biopsies were taken, your doctors clinic will contact you in 1 to 2   weeks with any results.  - Discharge patient to home.   - High fiber diet.   - Continue present medications.   - Repeat colonoscopy in 3 years for surveillance.   - Return to primary care physician as previously scheduled.   - Return to GI clinic PRN.   - The findings and recommendations were discussed with the patient's family.     - Patient has a contact number available for emergencies.  The signs and   symptoms of potential delayed complications were discussed with the   patient.  Return to normal activities tomorrow.  Written discharge   instructions were provided to the patient.  For questions, problems or results please call your physician - Emmanuel Perez MD at Work:  [phone].  [facility] can be reached at [phone]     IF A COMPLICATION OR EMERGENCY SITUATION ARISES AND YOU ARE UNABLE TO REACH   YOUR PHYSICIAN - GO DIRECTLY TO THE EMERGENCY ROOM.  Emmanuel Perez MD  1/29/2019 8:57:34 AM  This report has been verified and signed electronically.  [employer]

## 2019-01-29 NOTE — ANESTHESIA POSTPROCEDURE EVALUATION
"Anesthesia Post Evaluation    Patient: Ade Castellano    Procedure(s) Performed: Procedure(s) (LRB):  COLONOSCOPY (N/A)    Final Anesthesia Type: general  Patient location during evaluation: PACU  Patient participation: Yes- Able to Participate  Level of consciousness: awake and alert  Post-procedure vital signs: reviewed and stable  Pain management: adequate  Airway patency: patent  PONV status at discharge: No PONV  Anesthetic complications: no      Cardiovascular status: blood pressure returned to baseline and hemodynamically stable  Respiratory status: unassisted, spontaneous ventilation and room air  Hydration status: euvolemic  Follow-up not needed.        Visit Vitals  /64 (BP Location: Right leg, Patient Position: Lying)   Pulse (!) 54   Temp 36.7 °C (98 °F)   Resp 18   Ht 5' 2" (1.575 m)   Wt 81.2 kg (179 lb)   LMP  (LMP Unknown)   SpO2 100%   Breastfeeding? No   BMI 32.74 kg/m²       Pain/Sergio Score: Sergio Score: 10 (1/29/2019  9:15 AM)        "

## 2019-01-29 NOTE — TRANSFER OF CARE
"Anesthesia Transfer of Care Note    Patient: Ade Castellano    Procedure(s) Performed: Procedure(s) (LRB):  COLONOSCOPY (N/A)    Patient location: PACU    Anesthesia Type: MAC    Transport from OR: Transported from OR on room air with adequate spontaneous ventilation    Post pain: adequate analgesia    Post assessment: no apparent anesthetic complications    Post vital signs: stable    Level of consciousness: awake    Nausea/Vomiting: no nausea/vomiting    Complications: none    Transfer of care protocol was followed      Last vitals:   Visit Vitals  /63 (BP Location: Right arm, Patient Position: Lying)   Pulse (!) 51   Temp 36.4 °C (97.6 °F) (Oral)   Resp 18   Ht 5' 2" (1.575 m)   Wt 81.2 kg (179 lb)   LMP  (LMP Unknown)   SpO2 100%   Breastfeeding? No   BMI 32.74 kg/m²     "

## 2019-01-29 NOTE — ANESTHESIA PREPROCEDURE EVALUATION
01/29/2019  Ade Castellano is a 73 y.o., female.    Pre-op Assessment     I have reviewed the Nursing Notes.      Review of Systems  Anesthesia Hx:  No problems with previous Anesthesia    Social:  Former Smoker    Hematology/Oncology:         -- Cancer in past history: Breast   Cardiovascular:   Denies Pacemaker. Hypertension Past MI CAD    Angina, with exertion hyperlipidemia Echo 2017:  CONCLUSIONS     1 - Normal left ventricular systolic function (EF 60-65%).     2 - No wall motion abnormalities.     3 - Concentric remodeling.     4 - Trivial to mild aortic stenosis, LENORE = 1.8 cm2, peak velocity = 1.7 m/s.     5 - Mild tricuspid regurgitation.     6 - Pulmonary hypertension. The estimated PA systolic pressure is 54 mmHg.    Pulmonary:   COPD Sleep Apnea Hx lung ca   Renal/:  Renal/ Normal     Hepatic/GI:   Bowel Prep. PUD, Denies Hiatal Hernia.  Denies GERD. Denies Liver Disease.  Denies Hepatitis.    Neurological:   Denies CVA. Neuromuscular Disease, (bell's palsy)  Denies Seizures.    Endocrine:  Endocrine Normal        Physical Exam  General:  Obesity    Airway/Jaw/Neck:  AIRWAY FINDINGS: Normal      Chest/Lungs:  Chest/Lungs Clear    Heart/Vascular:  Heart Findings: Normal       Mental Status:  Mental Status Findings: Normal        Anesthesia Plan  Type of Anesthesia, risks & benefits discussed:  Anesthesia Type:  general  Patient's Preference:   Intra-op Monitoring Plan: standard ASA monitors  Intra-op Monitoring Plan Comments:   Post Op Pain Control Plan: per primary service following discharge from PACU  Post Op Pain Control Plan Comments:   Induction:   IV  Beta Blocker:  Patient is on a Beta-Blocker and has received one dose within the past 24 hours (No further documentation required).       Informed Consent: Patient understands risks and agrees with Anesthesia plan.  Questions answered.  Anesthesia consent signed with patient.  ASA Score: 3     Day of Surgery Review of History & Physical:  There are no significant changes.  H&P update referred to the provider.         Ready For Surgery From Anesthesia Perspective.

## 2019-02-01 ENCOUNTER — OFFICE VISIT (OUTPATIENT)
Dept: FAMILY MEDICINE | Facility: CLINIC | Age: 74
End: 2019-02-01
Payer: MEDICARE

## 2019-02-01 VITALS
RESPIRATION RATE: 18 BRPM | BODY MASS INDEX: 32.91 KG/M2 | TEMPERATURE: 98 F | OXYGEN SATURATION: 97 % | HEIGHT: 62 IN | WEIGHT: 178.81 LBS | HEART RATE: 50 BPM | SYSTOLIC BLOOD PRESSURE: 104 MMHG | DIASTOLIC BLOOD PRESSURE: 66 MMHG

## 2019-02-01 DIAGNOSIS — R05.3 CHRONIC COUGH: Primary | ICD-10-CM

## 2019-02-01 PROCEDURE — 1101F PR PT FALLS ASSESS DOC 0-1 FALLS W/OUT INJ PAST YR: ICD-10-PCS | Mod: HCNC,CPTII,S$GLB, | Performed by: FAMILY MEDICINE

## 2019-02-01 PROCEDURE — 99214 PR OFFICE/OUTPT VISIT, EST, LEVL IV, 30-39 MIN: ICD-10-PCS | Mod: HCNC,S$GLB,, | Performed by: FAMILY MEDICINE

## 2019-02-01 PROCEDURE — 1101F PT FALLS ASSESS-DOCD LE1/YR: CPT | Mod: HCNC,CPTII,S$GLB, | Performed by: FAMILY MEDICINE

## 2019-02-01 PROCEDURE — 99999 PR PBB SHADOW E&M-EST. PATIENT-LVL III: ICD-10-PCS | Mod: PBBFAC,HCNC,, | Performed by: FAMILY MEDICINE

## 2019-02-01 PROCEDURE — 99999 PR PBB SHADOW E&M-EST. PATIENT-LVL III: CPT | Mod: PBBFAC,HCNC,, | Performed by: FAMILY MEDICINE

## 2019-02-01 PROCEDURE — 99214 OFFICE O/P EST MOD 30 MIN: CPT | Mod: HCNC,S$GLB,, | Performed by: FAMILY MEDICINE

## 2019-02-01 RX ORDER — DESLORATADINE 5 MG/1
5 TABLET ORAL DAILY
Qty: 30 TABLET | Refills: 11 | Status: ON HOLD | OUTPATIENT
Start: 2019-02-01 | End: 2019-07-20 | Stop reason: CLARIF

## 2019-02-01 NOTE — PROGRESS NOTES
"Subjective:       Patient ID: Ade Castellano is a 73 y.o. female.    Chief Complaint: Cough    73 year old female with several months of a chronic cough. She states this for several months. She states it is a dry cough mostly and she states she feels like she has something in her throat. She has taken robitussin, tessalon perles, keflex, and cheratussin AC. She had temporary relief. She is on flonase and this helps with sneezing.       She states she feels like she is choking on her food. She states she chokes on her water as well.       Review of Systems    Objective:       Vitals:    02/01/19 0816   BP: 104/66   Pulse: (!) 50   Resp: 18   Temp: 97.6 °F (36.4 °C)   TempSrc: Oral   SpO2: 97%   Weight: 81.1 kg (178 lb 12.7 oz)   Height: 5' 2" (1.575 m)       Physical Exam   Constitutional: She is oriented to person, place, and time. She appears well-developed and well-nourished. No distress.   HENT:   Head: Normocephalic and atraumatic.   Right Ear: External ear normal.   Left Ear: External ear normal.   Mouth/Throat: Oropharynx is clear and moist. No oropharyngeal exudate.   Neck: Normal range of motion. Neck supple.   Cardiovascular: Normal rate, regular rhythm and normal heart sounds. Exam reveals no gallop and no friction rub.   No murmur heard.  Pulmonary/Chest: Effort normal and breath sounds normal. No respiratory distress. She has no wheezes. She has no rales. She exhibits no tenderness.   Lymphadenopathy:     She has cervical adenopathy.   Neurological: She is alert and oriented to person, place, and time.   Skin: She is not diaphoretic.       Assessment:       1. Chronic cough        Plan:       Ade was seen today for cough.    Diagnoses and all orders for this visit:    Chronic cough  -     desloratadine (CLARINEX) 5 mg tablet; Take 1 tablet (5 mg total) by mouth once daily.      She has tried cough medicine, antibiotics. Will give  2 week trial of clarinex and if no improvement--0> swallow study to " look for esophageal spasms vs. aspiration

## 2019-02-05 ENCOUNTER — OFFICE VISIT (OUTPATIENT)
Dept: FAMILY MEDICINE | Facility: CLINIC | Age: 74
End: 2019-02-05
Payer: MEDICARE

## 2019-02-05 VITALS
WEIGHT: 179.25 LBS | RESPIRATION RATE: 16 BRPM | TEMPERATURE: 98 F | OXYGEN SATURATION: 97 % | DIASTOLIC BLOOD PRESSURE: 60 MMHG | BODY MASS INDEX: 32.99 KG/M2 | SYSTOLIC BLOOD PRESSURE: 100 MMHG | HEART RATE: 52 BPM | HEIGHT: 62 IN

## 2019-02-05 DIAGNOSIS — I20.89 STABLE ANGINA: Chronic | ICD-10-CM

## 2019-02-05 DIAGNOSIS — J44.9 CHRONIC OBSTRUCTIVE PULMONARY DISEASE, UNSPECIFIED COPD TYPE: Chronic | ICD-10-CM

## 2019-02-05 DIAGNOSIS — I11.9 BENIGN HYPERTENSIVE HEART DISEASE WITHOUT HEART FAILURE: Chronic | ICD-10-CM

## 2019-02-05 DIAGNOSIS — Z00.00 ENCOUNTER FOR PREVENTIVE HEALTH EXAMINATION: Primary | ICD-10-CM

## 2019-02-05 DIAGNOSIS — I25.2 OLD MYOCARDIAL INFARCTION: ICD-10-CM

## 2019-02-05 DIAGNOSIS — G47.33 OSA (OBSTRUCTIVE SLEEP APNEA): Chronic | ICD-10-CM

## 2019-02-05 DIAGNOSIS — R73.03 PREDIABETES: ICD-10-CM

## 2019-02-05 DIAGNOSIS — I70.0 ATHEROSCLEROSIS OF AORTA: Chronic | ICD-10-CM

## 2019-02-05 DIAGNOSIS — Z12.39 BREAST CANCER SCREENING: ICD-10-CM

## 2019-02-05 DIAGNOSIS — Z86.010 PERSONAL HISTORY OF COLONIC POLYPS: ICD-10-CM

## 2019-02-05 DIAGNOSIS — Z85.3 HISTORY OF BREAST CANCER: Chronic | ICD-10-CM

## 2019-02-05 DIAGNOSIS — E78.5 HYPERLIPIDEMIA LDL GOAL <70: Chronic | ICD-10-CM

## 2019-02-05 DIAGNOSIS — I25.10 CORONARY ARTERY DISEASE INVOLVING NATIVE CORONARY ARTERY OF NATIVE HEART WITHOUT ANGINA PECTORIS: Chronic | ICD-10-CM

## 2019-02-05 PROBLEM — R53.81 DEBILITY: Status: RESOLVED | Noted: 2017-12-03 | Resolved: 2019-02-05

## 2019-02-05 PROBLEM — Z12.11 COLON CANCER SCREENING: Status: RESOLVED | Noted: 2018-11-28 | Resolved: 2019-02-05

## 2019-02-05 PROBLEM — Z98.890 H/O COLONOSCOPY: Status: RESOLVED | Noted: 2017-06-01 | Resolved: 2019-02-05

## 2019-02-05 PROBLEM — Z51.11 ENCOUNTER FOR CHEMOTHERAPY MANAGEMENT: Status: RESOLVED | Noted: 2018-05-07 | Resolved: 2019-02-05

## 2019-02-05 PROCEDURE — 99999 PR PBB SHADOW E&M-EST. PATIENT-LVL V: CPT | Mod: PBBFAC,HCNC,, | Performed by: PHYSICIAN ASSISTANT

## 2019-02-05 PROCEDURE — G0439 PR MEDICARE ANNUAL WELLNESS SUBSEQUENT VISIT: ICD-10-PCS | Mod: HCNC,S$GLB,, | Performed by: PHYSICIAN ASSISTANT

## 2019-02-05 PROCEDURE — 99499 RISK ADDL DX/OHS AUDIT: ICD-10-PCS | Mod: HCNC,S$GLB,, | Performed by: PHYSICIAN ASSISTANT

## 2019-02-05 PROCEDURE — G0439 PPPS, SUBSEQ VISIT: HCPCS | Mod: HCNC,S$GLB,, | Performed by: PHYSICIAN ASSISTANT

## 2019-02-05 PROCEDURE — 99499 UNLISTED E&M SERVICE: CPT | Mod: HCNC,S$GLB,, | Performed by: PHYSICIAN ASSISTANT

## 2019-02-05 PROCEDURE — 99999 PR PBB SHADOW E&M-EST. PATIENT-LVL V: ICD-10-PCS | Mod: PBBFAC,HCNC,, | Performed by: PHYSICIAN ASSISTANT

## 2019-02-05 NOTE — PROGRESS NOTES
"Ade Castellano presented for a  Medicare AWV and comprehensive Health Risk Assessment today. The following components were reviewed and updated:    · Medical history  · Family History  · Social history  · Allergies and Current Medications  · Health Risk Assessment  · Health Maintenance  · Care Team     ** See Completed Assessments for Annual Wellness Visit within the encounter summary.**       The following assessments were completed:  · Living Situation  · CAGE  · Depression Screening  · Timed Get Up and Go  · Whisper Test  · Cognitive Function Screening  · Nutrition Screening  · ADL Screening  · PAQ Screening      Vitals:    02/05/19 0845   BP: 100/60   BP Location: Left arm   Patient Position: Sitting   BP Method: Small (Manual)   Pulse: (!) 52   Resp: 16   Temp: 97.6 °F (36.4 °C)   TempSrc: Oral   SpO2: 97%   Weight: 81.3 kg (179 lb 3.7 oz)   Height: 5' 2" (1.575 m)     Body mass index is 32.78 kg/m².  Physical Exam   Constitutional: She is oriented to person, place, and time. No distress.   Eyes: Conjunctivae and EOM are normal.   Cardiovascular: Normal rate and regular rhythm.   Pulmonary/Chest: Effort normal.   Neurological: She is alert and oriented to person, place, and time.   Skin: Skin is warm.         Diagnoses and health risks identified today and associated recommendations/orders:    1. Prediabetes  - Hemoglobin A1c; Future    2. Breast cancer screening  - Mammo Digital Screening Bilat without CA; Future    3. Atherosclerosis of aorta  Continue to control RF BP and lipids   - Lipid panel; Future    4. Personal history of colonic polyps  UTD with colonoscopy. Due in 3 years    5. Benign hypertensive heart disease without heart failure  The current medical regimen is effective;  continue present plan and medications.    6. Coronary artery disease involving native coronary artery of native heart without angina pectoris  - Lipid panel; Future    7. Chronic obstructive pulmonary disease, unspecified COPD " type  The current medical regimen is effective;  continue present plan and medications.    8. History of breast cancer  Continue to monitor and follow up with heme onc    9. SUNITHA (obstructive sleep apnea)  Pt cannot tolerate mask. She follows up with pulm for COPD as well     10. Hyperlipidemia LDL goal <70  The current medical regimen is effective;  continue present plan and medications.  Continue to monitor     11. Old myocardial infarction  Continue to control RF BP and lipids     12. Stable angina  The current medical regimen is effective;  continue present plan and medications.    13. Encounter for preventive health examination  Pt states her lungs are do much better since chemo resolution  [facility] is [facility] the [facility].       Provided Ade with a 5-10 year written screening schedule and personal prevention plan. Recommendations were developed using the USPSTF age appropriate recommendations. Education, counseling, and referrals were provided as needed. After Visit Summary printed and given to patient which includes a list of additional screenings\tests needed.    No Follow-up on file.    OBDULIO Jay

## 2019-02-05 NOTE — PATIENT INSTRUCTIONS
Counseling and Referral of Other Preventative  (Italic type indicates deductible and co-insurance are waived)    Patient Name: Ade Castellano  Today's Date: 2/5/2019    Health Maintenance       Date Due Completion Date    Zoster Vaccine 10/08/2006 ---    Pneumococcal Vaccine (65+ High/Highest Risk) (2 of 2 - PPSV23) 09/05/2017 7/11/2017    Influenza Vaccine 08/01/2018 3/16/2018 (ClinicallyNA)    Override on 3/16/2018: Not Clinically Appropriate (on chemo. discussed with PCP. wait for next season)    Override on 9/20/2016: Done (pulmologist office)    Override on 2/11/2014: Done    TETANUS VACCINE 10/09/2019 (Originally 10/8/1963) ---    Colonoscopy 01/29/2020 1/29/2019    Override on 6/30/2017: Done    High Dose Statin 02/05/2020 2/5/2019    Mammogram 02/09/2020 2/9/2018    Override on 6/12/2017: Done    DEXA SCAN 03/08/2020 3/8/2017    Override on 3/8/2017: Done    Lipid Panel 07/20/2022 7/20/2017        Orders Placed This Encounter   Procedures    Mammo Digital Screening Bilat without CA    Lipid panel    Hemoglobin A1c     The following information is provided to all patients.  This information is to help you find resources for any of the problems found today that may be affecting your health:                Living healthy guide: www.[facility].[place].gov      Understanding Diabetes: www.diabetes.org      Eating healthy: www.cdc.gov/healthyweight      CDC home safety checklist: www.cdc.gov/steadi/patient.html      Agency on Aging: www.goea.[place].gov      Alcoholics anonymous (AA): www.aa.org      Physical Activity: www.anca.nih.gov/ge4iwic      Tobacco use: www.quitwithusla.org

## 2019-02-11 ENCOUNTER — INFUSION (OUTPATIENT)
Dept: INFUSION THERAPY | Facility: HOSPITAL | Age: 74
End: 2019-02-11
Attending: INTERNAL MEDICINE
Payer: MEDICARE

## 2019-02-11 DIAGNOSIS — C34.90 SQUAMOUS CELL CARCINOMA LUNG: Primary | ICD-10-CM

## 2019-02-11 PROCEDURE — 96523 IRRIG DRUG DELIVERY DEVICE: CPT | Mod: HCNC

## 2019-02-11 PROCEDURE — 25000003 PHARM REV CODE 250: Mod: HCNC | Performed by: INTERNAL MEDICINE

## 2019-02-11 PROCEDURE — A4216 STERILE WATER/SALINE, 10 ML: HCPCS | Mod: HCNC | Performed by: INTERNAL MEDICINE

## 2019-02-11 PROCEDURE — 63600175 PHARM REV CODE 636 W HCPCS: Mod: HCNC | Performed by: INTERNAL MEDICINE

## 2019-02-11 RX ORDER — HEPARIN 100 UNIT/ML
500 SYRINGE INTRAVENOUS
Status: CANCELLED | OUTPATIENT
Start: 2019-02-11

## 2019-02-11 RX ORDER — SODIUM CHLORIDE 0.9 % (FLUSH) 0.9 %
10 SYRINGE (ML) INJECTION
Status: DISCONTINUED | OUTPATIENT
Start: 2019-02-11 | End: 2019-02-11 | Stop reason: HOSPADM

## 2019-02-11 RX ORDER — SODIUM CHLORIDE 0.9 % (FLUSH) 0.9 %
10 SYRINGE (ML) INJECTION
Status: CANCELLED | OUTPATIENT
Start: 2019-02-11

## 2019-02-11 RX ORDER — HEPARIN 100 UNIT/ML
500 SYRINGE INTRAVENOUS
Status: DISCONTINUED | OUTPATIENT
Start: 2019-02-11 | End: 2019-02-11 | Stop reason: HOSPADM

## 2019-02-11 RX ADMIN — Medication 10 ML: at 08:02

## 2019-02-11 RX ADMIN — HEPARIN SODIUM (PORCINE) LOCK FLUSH IV SOLN 100 UNIT/ML 500 UNITS: 100 SOLUTION at 08:02

## 2019-02-11 NOTE — PLAN OF CARE
Problem: Adult Inpatient Plan of Care  Goal: Plan of Care Review  Outcome: Ongoing (interventions implemented as appropriate)  Patient tolerated monthly portacath flush. No current complaints/concerns at this time. Received discharge instructions and verbalized understanding. Ambulated unassisted off unit.

## 2019-02-21 ENCOUNTER — HOSPITAL ENCOUNTER (OUTPATIENT)
Dept: RADIOLOGY | Facility: HOSPITAL | Age: 74
Discharge: HOME | End: 2019-02-21
Attending: PHYSICIAN ASSISTANT
Payer: MEDICARE

## 2019-02-21 ENCOUNTER — HOSPITAL ENCOUNTER (OUTPATIENT)
Dept: RADIOLOGY | Facility: HOSPITAL | Age: 74
Discharge: HOME | End: 2019-02-21
Attending: INTERNAL MEDICINE
Payer: MEDICARE

## 2019-02-21 VITALS — WEIGHT: 179 LBS | HEIGHT: 62 IN | BODY MASS INDEX: 32.94 KG/M2

## 2019-02-21 DIAGNOSIS — C34.81 MALIGNANT NEOPLASM OF OVERLAPPING SITES OF RIGHT BRONCHUS AND LUNG: ICD-10-CM

## 2019-02-21 DIAGNOSIS — Z12.39 BREAST CANCER SCREENING: ICD-10-CM

## 2019-02-21 PROCEDURE — A9552 F18 FDG: HCPCS | Mod: HCNC

## 2019-02-21 PROCEDURE — 77063 MAMMO DIGITAL SCREENING BILAT WITH TOMOSYNTHESIS_CAD: ICD-10-PCS | Mod: 26,HCNC,, | Performed by: RADIOLOGY

## 2019-02-21 PROCEDURE — 77063 BREAST TOMOSYNTHESIS BI: CPT | Mod: 26,HCNC,, | Performed by: RADIOLOGY

## 2019-02-21 PROCEDURE — 78815 NM PET CT ROUTINE: ICD-10-PCS | Mod: 26,HCNC,PS, | Performed by: RADIOLOGY

## 2019-02-21 PROCEDURE — 77067 SCR MAMMO BI INCL CAD: CPT | Mod: TC,HCNC

## 2019-02-21 PROCEDURE — 77067 SCR MAMMO BI INCL CAD: CPT | Mod: 26,HCNC,, | Performed by: RADIOLOGY

## 2019-02-21 PROCEDURE — 78815 PET IMAGE W/CT SKULL-THIGH: CPT | Mod: 26,HCNC,PS, | Performed by: RADIOLOGY

## 2019-02-21 PROCEDURE — 78815 PET IMAGE W/CT SKULL-THIGH: CPT | Mod: TC,HCNC

## 2019-02-21 PROCEDURE — 77067 MAMMO DIGITAL SCREENING BILAT WITH TOMOSYNTHESIS_CAD: ICD-10-PCS | Mod: 26,HCNC,, | Performed by: RADIOLOGY

## 2019-02-25 ENCOUNTER — TELEPHONE (OUTPATIENT)
Dept: RADIOLOGY | Facility: HOSPITAL | Age: 74
End: 2019-02-25

## 2019-02-26 ENCOUNTER — HOSPITAL ENCOUNTER (OUTPATIENT)
Dept: RADIOLOGY | Facility: HOSPITAL | Age: 74
Discharge: HOME | End: 2019-02-26
Attending: PHYSICIAN ASSISTANT
Payer: MEDICARE

## 2019-02-26 DIAGNOSIS — R92.8 ABNORMAL MAMMOGRAM: ICD-10-CM

## 2019-02-26 PROCEDURE — 76642 ULTRASOUND BREAST LIMITED: CPT | Mod: TC,HCNC,RT

## 2019-02-26 PROCEDURE — 76642 US BREAST RIGHT LIMITED: ICD-10-PCS | Mod: 26,HCNC,RT, | Performed by: RADIOLOGY

## 2019-02-26 PROCEDURE — 76642 ULTRASOUND BREAST LIMITED: CPT | Mod: 26,HCNC,RT, | Performed by: RADIOLOGY

## 2019-03-04 ENCOUNTER — LAB VISIT (OUTPATIENT)
Dept: LAB | Facility: HOSPITAL | Age: 74
End: 2019-03-04
Attending: INTERNAL MEDICINE
Payer: MEDICARE

## 2019-03-04 ENCOUNTER — OFFICE VISIT (OUTPATIENT)
Dept: HEMATOLOGY/ONCOLOGY | Facility: CLINIC | Age: 74
End: 2019-03-04
Payer: MEDICARE

## 2019-03-04 VITALS
BODY MASS INDEX: 31.65 KG/M2 | TEMPERATURE: 98 F | WEIGHT: 173.06 LBS | DIASTOLIC BLOOD PRESSURE: 71 MMHG | OXYGEN SATURATION: 97 % | HEART RATE: 54 BPM | SYSTOLIC BLOOD PRESSURE: 122 MMHG

## 2019-03-04 DIAGNOSIS — C34.90 SQUAMOUS CELL CARCINOMA OF LUNG, UNSPECIFIED LATERALITY: Primary | Chronic | ICD-10-CM

## 2019-03-04 DIAGNOSIS — Z85.3 HISTORY OF BREAST CANCER: ICD-10-CM

## 2019-03-04 DIAGNOSIS — R59.0 LYMPHADENOPATHY, AXILLARY: ICD-10-CM

## 2019-03-04 DIAGNOSIS — I70.0 ATHEROSCLEROSIS OF AORTA: Chronic | ICD-10-CM

## 2019-03-04 DIAGNOSIS — R73.03 PREDIABETES: ICD-10-CM

## 2019-03-04 DIAGNOSIS — C34.81 MALIGNANT NEOPLASM OF OVERLAPPING SITES OF RIGHT BRONCHUS AND LUNG: ICD-10-CM

## 2019-03-04 DIAGNOSIS — Z92.21 HISTORY OF CHEMOTHERAPY: ICD-10-CM

## 2019-03-04 DIAGNOSIS — I25.10 CORONARY ARTERY DISEASE INVOLVING NATIVE CORONARY ARTERY OF NATIVE HEART WITHOUT ANGINA PECTORIS: Chronic | ICD-10-CM

## 2019-03-04 LAB
ALBUMIN SERPL BCP-MCNC: 3.9 G/DL
ALP SERPL-CCNC: 49 U/L
ALT SERPL W/O P-5'-P-CCNC: 19 U/L
ANION GAP SERPL CALC-SCNC: 10 MMOL/L
AST SERPL-CCNC: 18 U/L
BASOPHILS # BLD AUTO: 0.03 K/UL
BASOPHILS NFR BLD: 0.6 %
BILIRUB SERPL-MCNC: 0.4 MG/DL
BUN SERPL-MCNC: 17 MG/DL
CALCIUM SERPL-MCNC: 9.6 MG/DL
CHLORIDE SERPL-SCNC: 104 MMOL/L
CHOLEST SERPL-MCNC: 166 MG/DL
CHOLEST/HDLC SERPL: 3 {RATIO}
CO2 SERPL-SCNC: 24 MMOL/L
CREAT SERPL-MCNC: 0.8 MG/DL
DIFFERENTIAL METHOD: ABNORMAL
EOSINOPHIL # BLD AUTO: 0.1 K/UL
EOSINOPHIL NFR BLD: 2.2 %
ERYTHROCYTE [DISTWIDTH] IN BLOOD BY AUTOMATED COUNT: 13.5 %
EST. GFR  (AFRICAN AMERICAN): >60 ML/MIN/1.73 M^2
EST. GFR  (NON AFRICAN AMERICAN): >60 ML/MIN/1.73 M^2
ESTIMATED AVG GLUCOSE: 128 MG/DL
GLUCOSE SERPL-MCNC: 140 MG/DL
HBA1C MFR BLD HPLC: 6.1 %
HCT VFR BLD AUTO: 42.4 %
HDLC SERPL-MCNC: 56 MG/DL
HDLC SERPL: 33.7 %
HGB BLD-MCNC: 13.4 G/DL
LDLC SERPL CALC-MCNC: 71.6 MG/DL
LYMPHOCYTES # BLD AUTO: 1.6 K/UL
LYMPHOCYTES NFR BLD: 29.7 %
MCH RBC QN AUTO: 30.5 PG
MCHC RBC AUTO-ENTMCNC: 31.6 G/DL
MCV RBC AUTO: 96 FL
MONOCYTES # BLD AUTO: 0.6 K/UL
MONOCYTES NFR BLD: 11 %
NEUTROPHILS # BLD AUTO: 3 K/UL
NEUTROPHILS NFR BLD: 56.5 %
NONHDLC SERPL-MCNC: 110 MG/DL
PLATELET # BLD AUTO: 231 K/UL
PMV BLD AUTO: 9.3 FL
POTASSIUM SERPL-SCNC: 4.4 MMOL/L
PROT SERPL-MCNC: 6.7 G/DL
RBC # BLD AUTO: 4.4 M/UL
SODIUM SERPL-SCNC: 138 MMOL/L
TRIGL SERPL-MCNC: 192 MG/DL
WBC # BLD AUTO: 5.35 K/UL

## 2019-03-04 PROCEDURE — 1101F PR PT FALLS ASSESS DOC 0-1 FALLS W/OUT INJ PAST YR: ICD-10-PCS | Mod: HCNC,CPTII,S$GLB, | Performed by: INTERNAL MEDICINE

## 2019-03-04 PROCEDURE — 80053 COMPREHEN METABOLIC PANEL: CPT | Mod: HCNC

## 2019-03-04 PROCEDURE — 85025 COMPLETE CBC W/AUTO DIFF WBC: CPT | Mod: HCNC

## 2019-03-04 PROCEDURE — 99499 UNLISTED E&M SERVICE: CPT | Mod: HCNC,S$GLB,, | Performed by: INTERNAL MEDICINE

## 2019-03-04 PROCEDURE — 99214 OFFICE O/P EST MOD 30 MIN: CPT | Mod: HCNC,S$GLB,, | Performed by: INTERNAL MEDICINE

## 2019-03-04 PROCEDURE — 1101F PT FALLS ASSESS-DOCD LE1/YR: CPT | Mod: HCNC,CPTII,S$GLB, | Performed by: INTERNAL MEDICINE

## 2019-03-04 PROCEDURE — 99999 PR PBB SHADOW E&M-EST. PATIENT-LVL III: ICD-10-PCS | Mod: PBBFAC,HCNC,, | Performed by: INTERNAL MEDICINE

## 2019-03-04 PROCEDURE — 36415 COLL VENOUS BLD VENIPUNCTURE: CPT | Mod: HCNC

## 2019-03-04 PROCEDURE — 99214 PR OFFICE/OUTPT VISIT, EST, LEVL IV, 30-39 MIN: ICD-10-PCS | Mod: HCNC,S$GLB,, | Performed by: INTERNAL MEDICINE

## 2019-03-04 PROCEDURE — 80061 LIPID PANEL: CPT | Mod: HCNC

## 2019-03-04 PROCEDURE — 83036 HEMOGLOBIN GLYCOSYLATED A1C: CPT | Mod: HCNC

## 2019-03-04 PROCEDURE — 99499 RISK ADDL DX/OHS AUDIT: ICD-10-PCS | Mod: HCNC,S$GLB,, | Performed by: INTERNAL MEDICINE

## 2019-03-04 PROCEDURE — 99999 PR PBB SHADOW E&M-EST. PATIENT-LVL III: CPT | Mod: PBBFAC,HCNC,, | Performed by: INTERNAL MEDICINE

## 2019-03-04 NOTE — PROGRESS NOTES
Subjective:       Patient ID: Ade Castellano is a 73 y.o. female.    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       Pt hospitalized 11/2017   Ms. Castellano was admitted for acute on chronic respiratory failure requiring intubation and ventilation. Has large lung mass in right lung with probable post obstructive pneumonia resulting in COPD exacerbation. But also, patient had ran out of home O2 prior to onset of symptoms, likely contributing to presentation. Initiated on broad spectrum abx, IV steroids, duo-nebs and pulmonology consulted for ventilator co-management. Successfully extubated to BiPAP on 11/30. Then de-escalated to low flow nasal cannula. Abx tailored to augmentin for broad coverage, including anaerobic bacteria /     CTA chest 11/29/2017 reveals   Large right hilar mass with involvement of adjacent segmental pulmonary arterial branches and bronchi with associated postobstructive atelectasis and volume loss in the right middle lobe with adjacent ground glass opacity.  Findings highly concerning for underlying neoplastic process. Mediastinal and axillary adenopathy, with a right axillary lymph node measuring up to 2.0 cm in short axis diameter. No definite evidence of pulmonary thromboembolism.    She is followed by Pulm  She underwent rt axillary LN bx at outside facility- on 1/16/2018 [facility]  Pathology revealed metastatic poorly differentiated carcinoma of unknown primary site  TTF-1 negative, napsin negative  , cytokeratin 7 positive, cytokeratin 20 negative, p63 negative, cytokeratin 5/6 focal dim positivity    She next underwent lung bx [facility]  l1/31/2017   Pathology revealed benign lung tissue    She underwent repeat Right Lung Bx 2/14/2018 Pathology reveals Squamous cell carcinoma  PD-L1 10% low expression  EGFR NEG  ALK NEG  BRAF pending  Outside slides axillary LN specimen  requested for review/comparison to lung bx findings     Pt hospitalized  6/12/2018 with severe anemia with Hb 4.9g/dl  as well as neutropenia with white blood cell count of 910, .    She underwent 3 units prbc with improvement in Hb to 12.3g/dl and discharged next day     She s/p  cycle 6 of carboplatin/Abraxane    PET/CT  4/19/2018 revealed there has been a excellent response to therapy.  At least 90% reduction in malignant activity.    Today, she  Is doing well  She reports mild LOGAN  She no longer wears O2  Appetite and weight stable   She continues with occasional cough productive with clear sputum   No fatigue  No hemoptysis   Mild arthralgias- chronic,stable  No bleeding-urinary/rectal/nasal      PET/CT 2/21/2019 increased size and irregularity of the right axillary lymph node with increased FDG avidity    Recent MAMMO/US rt breast reveals suspicious findings rt axilla LN.  Biopsy of the lymph node measuring 1.4 x 1.1 x 1.3 recommended         PrevHx:She had an abnormal mammogram 6/4/2014 which  Revealed a round solid mass 6mm in rt breast.  She then underwent U/S guided core bx of rt breast mass on 6/17/2014.  Pathology revealed infiltrating ductal carcinoma Grade 3 with tumor  Present in thin-walled spaces suggestive of lymphatic spaces. Hormone  Receptor status on tumor specimen revealed ER negative 0% ,  [place] negative 0% and Her 2 jose maria negative.  She subsequently underwent rt segmental mastectomy and SLN bx  On 7/11/2014. Pathology of rt breast lumpectomy revealed invasive  Ductal carcinoma with micropapillary pattern ( Invasive micropapillary  Ca) with max tumor dimension 11.5mm with suggestion of tumor in   Thin walled spaces c/w lymphovascular involvement.  Surgical  Margins free of tumor, grade 3, ER/AK neg , Her 2 jose maria neg   With [place] Lymph node negative for Neoplasm.   Pathologic staging [id number](i-)  Her mother was diagnosed with breast cancer in her 50's/  She has no family history of ovarian cancer.           Review of Systems   Constitutional: Negative for appetite change, fatigue, fever and unexpected weight change.   HENT: Negative for mouth sores and rhinorrhea.    Eyes: Negative for visual disturbance.   Respiratory: Positive for cough. Negative for shortness of breath.    Cardiovascular: Negative for chest pain.   Gastrointestinal: Negative for abdominal pain and diarrhea.   Genitourinary: Negative for frequency.   Musculoskeletal: Positive for arthralgias. Negative for back pain.   Skin: Negative for rash.   Neurological: Negative for dizziness and headaches.   Hematological: Negative for adenopathy.   Psychiatric/Behavioral: The patient is not nervous/anxious.        Objective:        Vitals:    03/04/19 0854   BP: 122/71   BP Location: Right arm   Patient Position: Sitting   Pulse: (!) 54   Temp: 97.9 °F (36.6 °C)   TempSrc: Oral   SpO2: 97%   Weight: 78.5 kg (173 lb 1 oz)         Physical Exam   Constitutional: She is oriented to person, place, and time. She appears well-developed and well-nourished.   HENT:   Head: Normocephalic.   Mouth/Throat: Oropharynx is clear and moist. No oropharyngeal exudate.   Eyes: Conjunctivae and lids are normal. Pupils are equal, round, and reactive to light. No scleral icterus.   Neck: Normal range of motion. Neck supple. No thyromegaly present.   Cardiovascular: Normal rate, regular rhythm and normal heart sounds.   No murmur heard.  Pulmonary/Chest: Effort normal and breath sounds normal. She has no wheezes. Right breast exhibits no inverted nipple, no mass, no nipple discharge and no skin change. Left breast exhibits no inverted nipple, no mass, no nipple discharge and no skin change.   Abdominal: Soft. Bowel sounds  are normal. There is no tenderness. There is no rebound and no guarding.   Musculoskeletal: Normal range of motion. She exhibits no edema or tenderness.   Lymphadenopathy:     She has no cervical adenopathy.     She has axillary adenopathy.        Right: No supraclavicular adenopathy present.        Left: No supraclavicular adenopathy present.   Neurological: She is alert and oriented to person, place, and time. No cranial nerve deficit. Coordination normal.   Skin: Skin is warm and dry. No ecchymosis, no petechiae and no rash noted. No erythema.   Psychiatric: She has a normal mood and affect.             CT a/p w/contrast 11/29/2018   1. No acute abnormality identified within the abdomen and pelvis.    2.  There are a few nonspecific prominent lymph nodes in the upper abdomen including a periesophageal lymph node which measures 1.0 cm in short axis diameter.    3.  Significant abdominal aortic atherosclerosis and abdominal aorta ectasia.    4.  Additional findings as above.    PET/CT 1/26/2018   1.  Intense FDG uptake within the known right infrahilar lung mass, compatible with malignancy.  It is unclear if this represents primary lung malignancy or metastatic breast cancer.    2.  Intensely hypermetabolic right axillary, retropectoral, and lower right cervical lymph nodes, compatible with metastases.    3.  Abnormal FDG uptake along right breast skin thickening, new from prior chest CTA and mammogram.  This may relate to localized edema/inflammation, though correlation with physical exam and mammography are recommended to exclude underlying inflammatory carcinoma.      MRI Brain w w/out contrast 2/9/2018  1.  No evidence of intracranial metastases.  2.  Sinus disease       Rt axillary LN bx at outside facility- on 1/16/2018 at [facility]  Pathology revealed metastatic poorly differentiated carcinoma of unknown primary  site 1/16/2018 at [facility]  TTF-1 negative, napsin negative  ,  cytokeratin 7 positive, cytokeratin 20 negative, p63 negative, cytokeratin 5/6 focal dim positivity    LUNG BIOPSY 1/31/2017   Pathology revealed benign lung tissue         SPECIMEN  1) Right Lung Bx 2/14/2018  Supplemental Diagnosis  Immunohistochemical stains show strong nuclear staining for p63 in essentially all tumor cells and very strong  cytoplasmic and membrane staining for CK5/6, also in essentially all tumor cells. TTF-1 and CK7 are negative  within tumor cells but do stain native pulmonary elements present within the biopsy. A stain for mucicarmine is  negative. Positive and negative controls function appropriately.  Final diagnosis: Specimen submitted as right lung biopsy  -Squamous cell carcinoma  (Electronically Signed: 2018-02-20 09:12:07 )  Diagnosed by: Phong Thompson  FINAL PATHOLOGIC DIAGNOSIS  Fragments of pulmonary parenchyma (submitted as right lung biopsy):    FINAL PATHOLOGIC DIAGNOSIS 1. Lymph node, right axilla, biopsy, review of 10 outside slides [facility], JS 18 [phone],  collected January 11, 2018: Metastatic poorly differentiated carcinoma (see comment).  The histologic section shows fibrous stroma infiltrated by nest of poorly differentiated malignant cells including  occasional dyskeratotic cells morphologically suggestive of metastatic squamous cell carcinoma.  Immunohistochemical stains are nonspecific; the cells are positive for cytokeratin 7 and cytokeratin 5/6 (focal) and  negative for cytokeratin 20, TTF-1, p63, GCDFP, mammoglobin, and CEA        PET/CT 2/21/2019  Increased size and irregularity of the right axillary lymph node with increased FDG avidity.  Although this would be atypical in location for lung carcinoma, the increased size and avidity must be concerning for metastatic disease in this patient with history of squamous cell lung cancer.       Assessment:       1. Squamous cell carcinoma of lung, unspecified laterality    2. Lymphadenopathy, axillary     3. History of chemotherapy    4. History of breast cancer        Plan:   ECOG 0  1-4  Pt with widely metastatic squamous cell CA lung     Pt with hx of Stage 1A invasive ductal carcinoma rt breast s/p rt segmental mastectomy and SLN bx 7/11/2014 ER/MD neg HER 2 jose maria neg [id number](i-).  S/p adjuvant RT completed 9/2014 Pt declined adjuvant chemo  Follow-up Mammo 6/12/2017 No mammographic evidence of malignancy.  Pt  hospitalized 11/2017 with acute-on-chronic  resp failure  Abnormal CT imaging revealing Large right hilar mass with involvement of  adjacent segmental pulmonary arterial branches and bronchi with associated postobstructive atelectasis  and involvement of mediastinal and axillary adenopathy   S/p rt axillary LN bx ( outside facility) - metastatic poorly differentiated carcinoma of unknown primary  site  status post  lung biopsy 1/31/2017 -benign lung tissue  PET/CT 1/26/2018   Intense FDG uptake within the known right infrahilar lung mass, compatible with malignancy.   Intensely hypermetabolic right axillary, retropectoral, and lower right cervical lymph nodes, compatible with metastases.  Abnormal FDG uptake along right breast skin thickening, new from prior chest CTA and mammogram.    Repeat lung bx reveals Squamous Cell CA lung  PD-L1 10% low expression  EGFR NEG  ALK NEG      She s/p  cycle 6 of carboplatin/Abraxane Day1 completed 7/2/2018 ( day 15 held due to prolonged cytopenias)      Recent PET/CT 2/21/2019 increased size and irregularity of the right axillary lymph node with increased FDG avidity    Recent MAMMO/US rt breast reveals suspicious findings rt axilla LN.  Biopsy of the lymph node measuring 1.4 x 1.1 x 1.3 recommended    Pt scheduled to undergo rt axillary LN bx    Await  path findings    Follow-up with Pulmonology     Follow-up   1 mo with cbc,cmp prior to f/u   Cont PAC flush prn    Cc: Swetha Katz M.D.         MD Tory Roberson MD

## 2019-03-11 ENCOUNTER — HOSPITAL ENCOUNTER (OUTPATIENT)
Dept: RADIOLOGY | Facility: HOSPITAL | Age: 74
Discharge: HOME | End: 2019-03-11
Attending: PHYSICIAN ASSISTANT
Payer: MEDICARE

## 2019-03-11 ENCOUNTER — INFUSION (OUTPATIENT)
Dept: INFUSION THERAPY | Facility: HOSPITAL | Age: 74
End: 2019-03-11
Attending: INTERNAL MEDICINE
Payer: MEDICARE

## 2019-03-11 DIAGNOSIS — C34.90 SQUAMOUS CELL CARCINOMA LUNG: Primary | ICD-10-CM

## 2019-03-11 DIAGNOSIS — R92.8 ABNORMAL MAMMOGRAM OF RIGHT BREAST: ICD-10-CM

## 2019-03-11 PROCEDURE — 77065 DX MAMMO INCL CAD UNI: CPT | Mod: TC,HCNC,RT

## 2019-03-11 PROCEDURE — 88305 TISSUE EXAM BY PATHOLOGIST: CPT | Mod: HCNC | Performed by: PATHOLOGY

## 2019-03-11 PROCEDURE — 96523 IRRIG DRUG DELIVERY DEVICE: CPT | Mod: HCNC

## 2019-03-11 PROCEDURE — 88341 IMHCHEM/IMCYTCHM EA ADD ANTB: CPT | Mod: HCNC,59 | Performed by: PATHOLOGY

## 2019-03-11 PROCEDURE — 88305 TISSUE EXAM BY PATHOLOGIST: CPT | Mod: 26,HCNC,, | Performed by: PATHOLOGY

## 2019-03-11 PROCEDURE — 77065 DX MAMMO INCL CAD UNI: CPT | Mod: 26,HCNC,RT, | Performed by: RADIOLOGY

## 2019-03-11 PROCEDURE — 38505 US BIOPSY LYMPH NODE AXILLA: ICD-10-PCS | Mod: HCNC,,, | Performed by: RADIOLOGY

## 2019-03-11 PROCEDURE — 88342 IMHCHEM/IMCYTCHM 1ST ANTB: CPT | Mod: HCNC | Performed by: PATHOLOGY

## 2019-03-11 PROCEDURE — 88360 TUMOR IMMUNOHISTOCHEM/MANUAL: CPT | Mod: 26,HCNC,, | Performed by: PATHOLOGY

## 2019-03-11 PROCEDURE — 25000003 PHARM REV CODE 250: Mod: HCNC | Performed by: INTERNAL MEDICINE

## 2019-03-11 PROCEDURE — A4216 STERILE WATER/SALINE, 10 ML: HCPCS | Mod: HCNC | Performed by: INTERNAL MEDICINE

## 2019-03-11 PROCEDURE — 27201068 US BIOPSY LYMPH NODE AXILLA: Mod: HCNC

## 2019-03-11 PROCEDURE — 88305 TISSUE SPECIMEN TO PATHOLOGY, RADIOLOGY: ICD-10-PCS | Mod: 26,HCNC,, | Performed by: PATHOLOGY

## 2019-03-11 PROCEDURE — 88342 TISSUE SPECIMEN TO PATHOLOGY, RADIOLOGY: ICD-10-PCS | Mod: 26,HCNC,59, | Performed by: PATHOLOGY

## 2019-03-11 PROCEDURE — 88342 IMHCHEM/IMCYTCHM 1ST ANTB: CPT | Mod: 26,HCNC,59, | Performed by: PATHOLOGY

## 2019-03-11 PROCEDURE — 38505 NEEDLE BIOPSY LYMPH NODES: CPT | Mod: HCNC,,, | Performed by: RADIOLOGY

## 2019-03-11 PROCEDURE — 88341 TISSUE SPECIMEN TO PATHOLOGY, RADIOLOGY: ICD-10-PCS | Mod: 26,HCNC,59, | Performed by: PATHOLOGY

## 2019-03-11 PROCEDURE — 88360 PR  TUMOR IMMUNOHISTOCHEM/MANUAL: ICD-10-PCS | Mod: 26,HCNC,, | Performed by: PATHOLOGY

## 2019-03-11 PROCEDURE — 77065 MAMMO DIGITAL DIAGNOSTIC RIGHT WITH CAD: ICD-10-PCS | Mod: 26,HCNC,RT, | Performed by: RADIOLOGY

## 2019-03-11 PROCEDURE — 63600175 PHARM REV CODE 636 W HCPCS: Mod: HCNC | Performed by: INTERNAL MEDICINE

## 2019-03-11 PROCEDURE — 88341 IMHCHEM/IMCYTCHM EA ADD ANTB: CPT | Mod: 26,HCNC,59, | Performed by: PATHOLOGY

## 2019-03-11 RX ORDER — SODIUM CHLORIDE 0.9 % (FLUSH) 0.9 %
10 SYRINGE (ML) INJECTION
Status: CANCELLED | OUTPATIENT
Start: 2019-03-11

## 2019-03-11 RX ORDER — SODIUM CHLORIDE 0.9 % (FLUSH) 0.9 %
10 SYRINGE (ML) INJECTION
Status: DISCONTINUED | OUTPATIENT
Start: 2019-03-11 | End: 2019-03-11 | Stop reason: HOSPADM

## 2019-03-11 RX ORDER — HEPARIN 100 UNIT/ML
500 SYRINGE INTRAVENOUS
Status: DISCONTINUED | OUTPATIENT
Start: 2019-03-11 | End: 2019-03-11 | Stop reason: HOSPADM

## 2019-03-11 RX ORDER — HEPARIN 100 UNIT/ML
500 SYRINGE INTRAVENOUS
Status: CANCELLED | OUTPATIENT
Start: 2019-03-11

## 2019-03-11 RX ADMIN — Medication 10 ML: at 09:03

## 2019-03-11 RX ADMIN — HEPARIN SODIUM (PORCINE) LOCK FLUSH IV SOLN 100 UNIT/ML 500 UNITS: 100 SOLUTION at 09:03

## 2019-03-11 NOTE — DISCHARGE SUMMARY
Radiology Discharge Summary      Admit date: 3/11/2019  7:30 AM  Discharge date: March 11, 2019    Instructions Given to patient: YesVerbal    Diet: Regular    Activity:Restriction as listed: No strenuous activities for 48 hours.    Medications on discharge (List): Refer to Discharge Medication List    Hospital Course: U/S guided right axillary node biopsy    Description of Condition on Discharge: stable    Discharge Disposition: Home    Discharge Diagnosis: Right axillary adenopathy    Follow up as scheduled.

## 2019-03-11 NOTE — H&P
[facility]  History & Physical - Short Stay  Interventional Radiology    SUBJECTIVE:     Chief Complaint/Reason for Admission: Right axillary adenopathy    Informant(s):  self and Electronic Health Record    History of Present Illness:  Ade Castellano is a 73 y.o. female with a history of right axillary adenopathy.    Patient presents for U/S guided right axillary node biopsy.    Scheduled Meds:   Continuous Infusions:   PRN Meds:     Review of patient's allergies indicates:  No Known Allergies    Past Medical History:   Diagnosis Date    Acute respiratory failure with hypoxia and hypercapnia 11/29/2017    Angina pectoris     Arthritis     Bell's palsy     left facial weakness    Breast cancer     RIGHT    CAD (coronary artery disease)     Cervical cancer     Chronic bronchitis     COPD (chronic obstructive pulmonary disease)     Dr. Katz    Dental bridge present     Emphysema of lung     H/O colonoscopy 06/2017    due for repeat colonsocopy in 6/2018    History of heart artery stent     Dr. Ortiz  x2 stents    Hyperlipidemia     Hypertension     Lung cancer     Myocardial infarction     SUNITHA (obstructive sleep apnea)     intolerant to mask    Pneumonia     Pneumonia due to other staphylococcus     PUD (peptic ulcer disease)     Severe anemia 6/11/2018    Sleep apnea     Vaginal delivery     x1     Past Surgical History:   Procedure Laterality Date    ADENOIDECTOMY      [id number]-[id number]-EG N/A 2/14/2018    Performed by [facility] Provider at [facility] OR    BIOPSY-SENTINEL NODE (52618) Right 7/11/2014    Performed by Kameron Newberry MD at [facility] OR    BIOPSY-ULTRASOUND GUIDED CORE Right 6/17/2014    Performed by [facility] Diagnostic Provider at [facility] OR    BREAST BIOPSY Right     x3    BREAST LUMPECTOMY      BREAST SURGERY      lumpectomy right side     CERVIX SURGERY      cone    COLONOSCOPY N/A 1/29/2019    Performed by Emmanuel Perez MD at [facility] ENDO    COLONOSCOPY  N/A 2018    [facility] at [facility] ENDO    COLONOSCOPY N/A 2017    Performed by Julio Rudd MD at [facility] ENDO    COLONOSCOPY N/A 3/17/2017    Performed by Julio Rudd MD at [facility] ENDO    ESOPHAGOGASTRODUODENOSCOPY (EGD) N/A 10/11/2017    Performed by Julio Rudd MD at [facility] ENDO    EYE SURGERY Bilateral 2018    cataract     [id number]-E-CATH N/A 3/7/2018    Performed by [facility] Diagnostic Provider at [facility] OR    LUMPECTOMY RIGHT BREAST S/P NEEDLE LOCALIZATION  Right 2014    Performed by Kameron Newberry MD at [facility] OR    PORTACATH PLACEMENT Right 2018    sweat glands axillary regions Bilateral     TONSILLECTOMY       Family History   Problem Relation Age of Onset    Cancer Mother         breast    Heart disease Mother     Breast cancer Mother     Cancer Father         lung-smoker     Cancer Sister         lung-smoker     Heart attack Sister     Cancer Maternal Grandmother     Heart disease Maternal Grandmother     Cancer Maternal Grandfather     Heart disease Maternal Grandfather     Cancer Paternal Grandmother     Cancer Paternal Grandfather     Cancer Sister         mets not sure where it started     No Known Problems Sister      Social History     Tobacco Use    Smoking status: Former Smoker     Packs/day: 0.25     Years: 50.00     Pack years: 12.50     Types: Cigarettes     Last attempt to quit: 2017     Years since quittin.3    Smokeless tobacco: Never Used   Substance Use Topics    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    OBJECTIVE:     Vital Signs (Most Recent):       Physical Exam:  Alert and oriented.    Laboratory  CBC:   Lab Results   Component Value Date/Time    WBC 5.35 2019 07:28 AM    RBC 4.40 2019 07:28 AM    HGB 13.4 2019 07:28 AM    HCT 42.4 2019 07:28 AM     2019 07:28 AM    MCV 96 2019 07:28 AM    [facility] 30.5  03/04/2019 07:28 AM    [facility] 31.6 (L) 03/04/2019 07:28 AM         ASSESSMENT/PLAN:     Right axillary adenopathy.    Patient will undergo U/S guided right axillary node biopsy.    Sedation Plan: Lidocaine local anesthesia only

## 2019-03-11 NOTE — OP NOTE
[facility]  Radiology  Procedure - Outpatient    Date: 03/11/2019 Time: 10:29 AM    Pre-Op Diagnosis: Right axillary adenopathy    Post-Op Diagnosis: Right axillary adenopathy    Procedure Performed by: Sabino Duenas MD    Assistant: none    Procedure: U/S guided right axillary node biopsy    Specimen/Tissue Removed: 5 x 14 gauge cores    Estimated Blood Loss: Less than 5 mL    Procedure Note/Findings: U/S guided right axillary node biopsy performed in usual manner with no immediate post-procedure complications noted.    Please refer to dictated report for additional details.

## 2019-03-25 ENCOUNTER — OFFICE VISIT (OUTPATIENT)
Dept: HEMATOLOGY/ONCOLOGY | Facility: CLINIC | Age: 74
End: 2019-03-25
Payer: MEDICARE

## 2019-03-25 VITALS
HEIGHT: 62 IN | SYSTOLIC BLOOD PRESSURE: 125 MMHG | WEIGHT: 185.44 LBS | BODY MASS INDEX: 34.12 KG/M2 | HEART RATE: 55 BPM | DIASTOLIC BLOOD PRESSURE: 71 MMHG | TEMPERATURE: 98 F | OXYGEN SATURATION: 95 %

## 2019-03-25 DIAGNOSIS — Z85.3 HISTORY OF BREAST CANCER: Chronic | ICD-10-CM

## 2019-03-25 DIAGNOSIS — R59.0 AXILLARY LYMPHADENOPATHY: ICD-10-CM

## 2019-03-25 DIAGNOSIS — C34.90 SQUAMOUS CELL CARCINOMA OF LUNG, UNSPECIFIED LATERALITY: Primary | Chronic | ICD-10-CM

## 2019-03-25 DIAGNOSIS — Z92.21 HISTORY OF CHEMOTHERAPY: ICD-10-CM

## 2019-03-25 PROCEDURE — 1101F PT FALLS ASSESS-DOCD LE1/YR: CPT | Mod: HCNC,CPTII,S$GLB, | Performed by: INTERNAL MEDICINE

## 2019-03-25 PROCEDURE — 99499 UNLISTED E&M SERVICE: CPT | Mod: HCNC,S$GLB,, | Performed by: INTERNAL MEDICINE

## 2019-03-25 PROCEDURE — 1101F PR PT FALLS ASSESS DOC 0-1 FALLS W/OUT INJ PAST YR: ICD-10-PCS | Mod: HCNC,CPTII,S$GLB, | Performed by: INTERNAL MEDICINE

## 2019-03-25 PROCEDURE — 99999 PR PBB SHADOW E&M-EST. PATIENT-LVL III: CPT | Mod: PBBFAC,HCNC,, | Performed by: INTERNAL MEDICINE

## 2019-03-25 PROCEDURE — 99999 PR PBB SHADOW E&M-EST. PATIENT-LVL III: ICD-10-PCS | Mod: PBBFAC,HCNC,, | Performed by: INTERNAL MEDICINE

## 2019-03-25 PROCEDURE — 99214 OFFICE O/P EST MOD 30 MIN: CPT | Mod: HCNC,S$GLB,, | Performed by: INTERNAL MEDICINE

## 2019-03-25 PROCEDURE — 99499 RISK ADDL DX/OHS AUDIT: ICD-10-PCS | Mod: HCNC,S$GLB,, | Performed by: INTERNAL MEDICINE

## 2019-03-25 PROCEDURE — 99214 PR OFFICE/OUTPT VISIT, EST, LEVL IV, 30-39 MIN: ICD-10-PCS | Mod: HCNC,S$GLB,, | Performed by: INTERNAL MEDICINE

## 2019-03-25 NOTE — PROGRESS NOTES
Subjective:       Patient ID: Ade Castellano is a 73 y.o. female.    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       Pt hospitalized 11/2017   Ms. Castellano was admitted for acute on chronic respiratory failure requiring intubation and ventilation. Has large lung mass in right lung with probable post obstructive pneumonia resulting in COPD exacerbation. But also, patient had ran out of home O2 prior to onset of symptoms, likely contributing to presentation. Initiated on broad spectrum abx, IV steroids, duo-nebs and pulmonology consulted for ventilator co-management. Successfully extubated to BiPAP on 11/30. Then de-escalated to low flow nasal cannula. Abx tailored to augmentin for broad coverage, including anaerobic bacteria /     CTA chest 11/29/2017 reveals   Large right hilar mass with involvement of adjacent segmental pulmonary arterial branches and bronchi with associated postobstructive atelectasis and volume loss in the right middle lobe with adjacent ground glass opacity.  Findings highly concerning for underlying neoplastic process. Mediastinal and axillary adenopathy, with a right axillary lymph node measuring up to 2.0 cm in short axis diameter. No definite evidence of pulmonary thromboembolism.    She is followed by Pulm  She underwent rt axillary LN bx at outside facility- on 1/16/2018 at [facility]  Pathology revealed metastatic poorly differentiated carcinoma of unknown primary site  TTF-1 negative, napsin negative  , cytokeratin 7 positive, cytokeratin 20 negative, p63 negative, cytokeratin 5/6 focal dim positivity    She next underwent lung bx at [facility]  l1/31/2017   Pathology revealed benign lung tissue    She underwent repeat Right Lung Bx 2/14/2018 Pathology reveals Squamous cell carcinoma  PD-L1 10% low expression  EGFR NEG  ALK NEG  BRAF pending  Outside slides axillary LN specimen  requested for review/comparison to lung bx findings     Pt hospitalized  6/12/2018 with severe anemia with Hb 4.9g/dl  as well as neutropenia with white blood cell count of 910, .    She underwent 3 units prbc with improvement in Hb to 12.3g/dl and discharged next day     She s/p  cycle 6 of carboplatin/Abraxane    PET/CT  4/19/2018 revealed there has been a excellent response to therapy.  At least 90% reduction in malignant activity.    Today, she  Is doing well  Cough resolved  No LOGAN  She no longer wears O2  Appetite and weight stable   No fatigue  No hemoptysis   Mild arthralgias- chronic,stable  No bleeding-urinary/rectal/nasal      PET/CT 2/21/2019 increased size and irregularity of the right axillary lymph node with increased FDG avidity    Recent MAMMO/US rt breast reveals suspicious findings rt axilla LN.  Biopsy of the lymph node measuring 1.4 x 1.1 x 1.3 recommended    Pathology 3/11/2019   FINAL PATHOLOGIC DIAGNOSIS  Right axilla, mass, core biopsy:  Positive for poorly differentiated carcinoma.     PrevHx:She had an abnormal mammogram 6/4/2014 which  Revealed a round solid mass 6mm in rt breast.  She then underwent U/S guided core bx of rt breast mass on 6/17/2014.  Pathology revealed infiltrating ductal carcinoma Grade 3 with tumor  Present in thin-walled spaces suggestive of lymphatic spaces. Hormone  Receptor status on tumor specimen revealed ER negative 0% ,  [place] negative 0% and Her 2 jose maria negative.  She subsequently underwent rt segmental mastectomy and SLN bx  On 7/11/2014. Pathology of rt breast lumpectomy revealed invasive  Ductal carcinoma with micropapillary pattern ( Invasive micropapillary  Ca) with max tumor dimension 11.5mm with suggestion of tumor in  "  Thin walled spaces c/w lymphovascular involvement. Surgical  Margins free of tumor, grade 3, ER/IN neg , Her 2 jose maria neg   With [place] Lymph node negative for Neoplasm.   Pathologic staging [id number](i-)  Her mother was diagnosed with breast cancer in her 50's/  She has no family history of ovarian cancer.           Review of Systems   Constitutional: Negative for appetite change, fatigue, fever and unexpected weight change.   HENT: Negative for mouth sores and rhinorrhea.    Eyes: Negative for visual disturbance.   Respiratory: Negative for shortness of breath.    Cardiovascular: Negative for chest pain.   Gastrointestinal: Negative for abdominal pain and diarrhea.   Genitourinary: Negative for frequency.   Musculoskeletal: Positive for arthralgias. Negative for back pain.   Skin: Negative for rash.   Neurological: Negative for dizziness and headaches.   Hematological: Negative for adenopathy.   Psychiatric/Behavioral: The patient is not nervous/anxious.        Objective:        Vitals:    03/25/19 0902   BP: 125/71   BP Location: Left arm   Patient Position: Sitting   BP Method: Medium (Automatic)   Pulse: (!) 55   Temp: 97.6 °F (36.4 °C)   TempSrc: Oral   SpO2: 95%   Weight: 84.1 kg (185 lb 6.5 oz)   Height: 5' 2" (1.575 m)         Physical Exam   Constitutional: She is oriented to person, place, and time. She appears well-developed and well-nourished.   HENT:   Head: Normocephalic.   Mouth/Throat: Oropharynx is clear and moist. No oropharyngeal exudate.   Eyes: Pupils are equal, round, and reactive to light. Conjunctivae and lids are normal. No scleral icterus.   Neck: Normal range of motion. Neck supple. No thyromegaly present.   Cardiovascular: Normal rate, regular rhythm and normal heart sounds.   No murmur heard.  Pulmonary/Chest: Effort normal and breath sounds normal. She has no wheezes. Right breast exhibits no inverted nipple, no mass, no nipple discharge and no skin change. Left breast exhibits no " inverted nipple, no mass, no nipple discharge and no skin change.   Abdominal: Soft. Bowel sounds are normal. There is no tenderness. There is no rebound and no guarding.   Musculoskeletal: Normal range of motion. She exhibits no edema or tenderness.   Lymphadenopathy:     She has no cervical adenopathy.     She has axillary adenopathy.        Right: No supraclavicular adenopathy present.        Left: No supraclavicular adenopathy present.   Neurological: She is alert and oriented to person, place, and time. No cranial nerve deficit. Coordination normal.   Skin: Skin is warm and dry. No ecchymosis, no petechiae and no rash noted. No erythema.   Psychiatric: She has a normal mood and affect.             CT a/p w/contrast 11/29/2018   1. No acute abnormality identified within the abdomen and pelvis.    2.  There are a few nonspecific prominent lymph nodes in the upper abdomen including a periesophageal lymph node which measures 1.0 cm in short axis diameter.    3.  Significant abdominal aortic atherosclerosis and abdominal aorta ectasia.    4.  Additional findings as above.    PET/CT 1/26/2018   1.  Intense FDG uptake within the known right infrahilar lung mass, compatible with malignancy.  It is unclear if this represents primary lung malignancy or metastatic breast cancer.    2.  Intensely hypermetabolic right axillary, retropectoral, and lower right cervical lymph nodes, compatible with metastases.    3.  Abnormal FDG uptake along right breast skin thickening, new from prior chest CTA and mammogram.  This may relate to localized edema/inflammation, though correlation with physical exam and mammography are recommended to exclude underlying inflammatory carcinoma.      MRI Brain w w/out contrast 2/9/2018  1.  No evidence of intracranial metastases.  2.  Sinus disease       Rt axillary LN bx at outside facility- on 1/16/2018 at [facility]  Pathology revealed metastatic poorly differentiated carcinoma of  unknown primary  site 1/16/2018 at [facility]  TTF-1 negative, napsin negative  , cytokeratin 7 positive, cytokeratin 20 negative, p63 negative, cytokeratin 5/6 focal dim positivity    LUNG BIOPSY 1/31/2017   Pathology revealed benign lung tissue         SPECIMEN  1) Right Lung Bx 2/14/2018  Supplemental Diagnosis  Immunohistochemical stains show strong nuclear staining for p63 in essentially all tumor cells and very strong  cytoplasmic and membrane staining for CK5/6, also in essentially all tumor cells. TTF-1 and CK7 are negative  within tumor cells but do stain native pulmonary elements present within the biopsy. A stain for mucicarmine is  negative. Positive and negative controls function appropriately.  Final diagnosis: Specimen submitted as right lung biopsy  -Squamous cell carcinoma  (Electronically Signed: 2018-02-20 09:12:07 )  Diagnosed by: Phong Thompson  FINAL PATHOLOGIC DIAGNOSIS  Fragments of pulmonary parenchyma (submitted as right lung biopsy):    FINAL PATHOLOGIC DIAGNOSIS 1. Lymph node, right axilla, biopsy, review of 10 outside slides [facility], JS [phone],  collected January 11, 2018: Metastatic poorly differentiated carcinoma (see comment).  The histologic section shows fibrous stroma infiltrated by nest of poorly differentiated malignant cells including  occasional dyskeratotic cells morphologically suggestive of metastatic squamous cell carcinoma.  Immunohistochemical stains are nonspecific; the cells are positive for cytokeratin 7 and cytokeratin 5/6 (focal) and  negative for cytokeratin 20, TTF-1, p63, GCDFP, mammoglobin, and CEA        PET/CT 2/21/2019  Increased size and irregularity of the right axillary lymph node with increased FDG avidity.  Although this would be atypical in location for lung carcinoma, the increased size and avidity must be concerning for metastatic disease in this patient with history of squamous cell lung cancer.     US Rt breast  and mammo 2/26/2019  Impression:  Right  Lymph Node: Right axilla lymph node. Assessment: 4 - Suspicious finding. Biopsy is recommended.      BI-RADS Category:   Right: 4 - Suspicious  Overall: 4 - Suspicious     Recommendation:  Biopsy is recommended. Biopsy of the lymph node measuring 1.4 x 1.1 x 1.3 recommended , the one with the round shape configuration, corresponding to the most recent PET CT finding.   I discussed the findings with the patient. She will be scheduled for her biopsy.        Pathology 3/11/2019   FINAL PATHOLOGIC DIAGNOSIS  Right axilla, mass, core biopsy:  Positive for poorly differentiated carcinoma.  See comment.  Comment:  The biopsy from the right axilla mass shows a poorly differentiated carcinoma in background lymphoid tissue  with enlarged cells showing increased nuclear size, prominent nucleoli, moderate amounts of cytoplasm and mitotic  figures. Immunostains are completed and reveal the tumor cells to stain positively with cytokeratin AE 1/AE3, CK  5/6 and CK 7. The tumor cells are negative for CK 20, Estrogen receptor, p63 and TTF-1. All stains have  satisfactory positive and negative controls. The patient's prior cases will be reviewed and an additional stain for  [id number] is pending in an attempt to pinpoint the primary site of this malignancy and results will follow in a  supplemental report.              Assessment:       1. Squamous cell carcinoma of lung, unspecified laterality    2. History of breast cancer    3. Axillary lymphadenopathy    4. History of chemotherapy        Plan:   ECOG 0  1-4  Pt with widely metastatic squamous cell CA lung     Pt with hx of Stage 1A invasive ductal carcinoma rt breast s/p rt segmental mastectomy and SLN bx 7/11/2014 ER/AR neg HER 2 jose maria neg [id number](i-).  S/p adjuvant RT completed 9/2014 Pt declined adjuvant chemo  Follow-up Mammo 6/12/2017 No mammographic evidence of malignancy.  Pt  hospitalized 11/2017 with acute-on-chronic  resp  failure  Abnormal CT imaging revealing Large right hilar mass with involvement of  adjacent segmental pulmonary arterial branches and bronchi with associated postobstructive atelectasis  and involvement of mediastinal and axillary adenopathy   S/p rt axillary LN bx ( outside facility) - metastatic poorly differentiated carcinoma of unknown primary  site  status post  lung biopsy 1/31/2017 -benign lung tissue  PET/CT 1/26/2018   Intense FDG uptake within the known right infrahilar lung mass, compatible with malignancy.   Intensely hypermetabolic right axillary, retropectoral, and lower right cervical lymph nodes, compatible with metastases.  Abnormal FDG uptake along right breast skin thickening, new from prior chest CTA and mammogram.    Repeat lung bx reveals Squamous Cell CA lung  PD-L1 10% low expression  EGFR NEG  ALK NEG      She s/p  cycle 6 of carboplatin/Abraxane Day1 completed 7/2/2018 ( day 15 held due to prolonged cytopenias)      Recent PET/CT 2/21/2019 increased size and irregularity of the right axillary lymph node with increased FDG avidity    Recent MAMMO/US rt breast reveals suspicious findings rt axilla LN.  Biopsy of the lymph node measuring 1.4 x 1.1 x 1.3     Pt s/p  rt axillary LN bx  Pathology 3/11/2019   FINAL PATHOLOGIC DIAGNOSIS  Right axilla, mass, core biopsy:  Positive for poorly differentiated carcinoma.  Awaiting further immunostaining and comparison to prior pathology findings        Follow-up with Pulmonology     Follow-up   1 mo with cbc,cmp prior to f/u   Cont PAC flush prn      Follow-up in 2 weeks     Cc: Swetha Katz M.D.         MD Tory Roberson MD

## 2019-04-10 ENCOUNTER — INFUSION (OUTPATIENT)
Dept: INFUSION THERAPY | Facility: HOSPITAL | Age: 74
End: 2019-04-10
Attending: PHYSICIAN ASSISTANT
Payer: MEDICARE

## 2019-04-10 DIAGNOSIS — C34.90 SQUAMOUS CELL CARCINOMA LUNG: Primary | ICD-10-CM

## 2019-04-10 PROCEDURE — 25000003 PHARM REV CODE 250: Mod: HCNC | Performed by: INTERNAL MEDICINE

## 2019-04-10 PROCEDURE — 96523 IRRIG DRUG DELIVERY DEVICE: CPT | Mod: HCNC

## 2019-04-10 PROCEDURE — A4216 STERILE WATER/SALINE, 10 ML: HCPCS | Mod: HCNC | Performed by: INTERNAL MEDICINE

## 2019-04-10 PROCEDURE — 63600175 PHARM REV CODE 636 W HCPCS: Mod: HCNC | Performed by: INTERNAL MEDICINE

## 2019-04-10 RX ORDER — SODIUM CHLORIDE 0.9 % (FLUSH) 0.9 %
10 SYRINGE (ML) INJECTION
Status: DISCONTINUED | OUTPATIENT
Start: 2019-04-10 | End: 2019-04-10 | Stop reason: HOSPADM

## 2019-04-10 RX ORDER — HEPARIN 100 UNIT/ML
500 SYRINGE INTRAVENOUS
Status: CANCELLED | OUTPATIENT
Start: 2019-04-10

## 2019-04-10 RX ORDER — SODIUM CHLORIDE 0.9 % (FLUSH) 0.9 %
10 SYRINGE (ML) INJECTION
Status: CANCELLED | OUTPATIENT
Start: 2019-04-10

## 2019-04-10 RX ORDER — HEPARIN 100 UNIT/ML
500 SYRINGE INTRAVENOUS
Status: DISCONTINUED | OUTPATIENT
Start: 2019-04-10 | End: 2019-04-10 | Stop reason: HOSPADM

## 2019-04-10 RX ADMIN — Medication 10 ML: at 08:04

## 2019-04-10 RX ADMIN — HEPARIN SODIUM (PORCINE) LOCK FLUSH IV SOLN 100 UNIT/ML 500 UNITS: 100 SOLUTION at 08:04

## 2019-04-10 NOTE — PLAN OF CARE
Problem: Adult Inpatient Plan of Care  Goal: Plan of Care Review  Outcome: Ongoing (interventions implemented as appropriate)  Arrived to unit. No complaints voiced. Pt in good spirits. Tolerated port flush. Pt has next appt. Pt ambulatory, discharged from unit.

## 2019-04-18 ENCOUNTER — OFFICE VISIT (OUTPATIENT)
Dept: FAMILY MEDICINE | Facility: CLINIC | Age: 74
End: 2019-04-18
Payer: MEDICARE

## 2019-04-18 VITALS
SYSTOLIC BLOOD PRESSURE: 120 MMHG | HEART RATE: 58 BPM | BODY MASS INDEX: 33.68 KG/M2 | OXYGEN SATURATION: 97 % | DIASTOLIC BLOOD PRESSURE: 84 MMHG | HEIGHT: 62 IN | WEIGHT: 183 LBS | TEMPERATURE: 98 F

## 2019-04-18 DIAGNOSIS — L08.9 INFECTED EPITHELIAL INCLUSION CYST: Primary | ICD-10-CM

## 2019-04-18 DIAGNOSIS — R05.9 COUGH: ICD-10-CM

## 2019-04-18 DIAGNOSIS — C34.91 SQUAMOUS CELL CARCINOMA OF RIGHT LUNG: Chronic | ICD-10-CM

## 2019-04-18 DIAGNOSIS — L72.0 INFECTED EPITHELIAL INCLUSION CYST: Primary | ICD-10-CM

## 2019-04-18 PROCEDURE — 99214 OFFICE O/P EST MOD 30 MIN: CPT | Mod: 25,HCNC,S$GLB, | Performed by: INTERNAL MEDICINE

## 2019-04-18 PROCEDURE — 10060 PR DRAIN SKIN ABSCESS SIMPLE: ICD-10-PCS | Mod: HCNC,S$GLB,, | Performed by: INTERNAL MEDICINE

## 2019-04-18 PROCEDURE — 10060 I&D ABSCESS SIMPLE/SINGLE: CPT | Mod: HCNC,S$GLB,, | Performed by: INTERNAL MEDICINE

## 2019-04-18 PROCEDURE — 99999 PR PBB SHADOW E&M-EST. PATIENT-LVL III: ICD-10-PCS | Mod: PBBFAC,HCNC,, | Performed by: INTERNAL MEDICINE

## 2019-04-18 PROCEDURE — 1101F PR PT FALLS ASSESS DOC 0-1 FALLS W/OUT INJ PAST YR: ICD-10-PCS | Mod: HCNC,CPTII,S$GLB, | Performed by: INTERNAL MEDICINE

## 2019-04-18 PROCEDURE — 99999 PR PBB SHADOW E&M-EST. PATIENT-LVL III: CPT | Mod: PBBFAC,HCNC,, | Performed by: INTERNAL MEDICINE

## 2019-04-18 PROCEDURE — 99214 PR OFFICE/OUTPT VISIT, EST, LEVL IV, 30-39 MIN: ICD-10-PCS | Mod: 25,HCNC,S$GLB, | Performed by: INTERNAL MEDICINE

## 2019-04-18 PROCEDURE — 1101F PT FALLS ASSESS-DOCD LE1/YR: CPT | Mod: HCNC,CPTII,S$GLB, | Performed by: INTERNAL MEDICINE

## 2019-04-18 RX ORDER — PROMETHAZINE HYDROCHLORIDE AND DEXTROMETHORPHAN HYDROBROMIDE 6.25; 15 MG/5ML; MG/5ML
5 SYRUP ORAL EVERY 12 HOURS PRN
Qty: 180 ML | Refills: 0 | Status: SHIPPED | OUTPATIENT
Start: 2019-04-18 | End: 2019-04-28

## 2019-04-18 RX ORDER — DOXYCYCLINE HYCLATE 100 MG
100 TABLET ORAL 2 TIMES DAILY
Qty: 14 TABLET | Refills: 0 | Status: SHIPPED | OUTPATIENT
Start: 2019-04-18 | End: 2019-04-25

## 2019-04-18 NOTE — PROCEDURES
"Incision & Drainage  Date/Time: 4/18/2019 3:49 PM  Performed by: Vera Conrad MD  Authorized by: Vera Conrad MD     Time out: Immediately prior to procedure a "time out" was called to verify the correct patient, procedure, equipment, support staff and site/side marked as required.    Consent Done?:  Yes (Verbal)    Type:  Cyst  Body area:  Trunk  Location details:  Back  Anesthesia:  Local infiltration  Local anesthetic: lidocaine 2% without epinephrine  Anesthetic total (ml):  2  Scalpel size:  10  Incision type:  Single straight  Complexity:  Simple  Drainage:  Bloody  Drainage amount:  Scant  Wound treatment:  Incision, no return and wound left open  Patient tolerance:  Patient tolerated the procedure well with no immediate complications      "

## 2019-04-18 NOTE — PROGRESS NOTES
SUBJECTIVE     Chief Complaint   Patient presents with    Cyst on Back       HPI  Ade Castellano is a 73 y.o. female with multiple medical diagnoses as listed in the medical history and problem list that presents for evaluation of cyst on her back for several years, but it became inflammed ~2 weeks ago. It is very itchy and she has some discomfort when presses it. It is erythematous, but denies any drainage. +chills, but denies any fever or night sweats. She has been applying Neosporin and soaking in warm water without improvement.     PAST MEDICAL HISTORY:  Past Medical History:   Diagnosis Date    Acute respiratory failure with hypoxia and hypercapnia 11/29/2017    Angina pectoris     Arthritis     Bell's palsy     left facial weakness    Breast cancer     RIGHT    CAD (coronary artery disease)     Cervical cancer     Chronic bronchitis     COPD (chronic obstructive pulmonary disease)     Dr. Katz    Dental bridge present     Emphysema of lung     H/O colonoscopy 06/2017    due for repeat colonsocopy in 6/2018    History of heart artery stent     Dr. Ortiz  x2 stents    Hyperlipidemia     Hypertension     Lung cancer     Myocardial infarction     SUNITHA (obstructive sleep apnea)     intolerant to mask    Pneumonia     Pneumonia due to other staphylococcus     PUD (peptic ulcer disease)     Severe anemia 6/11/2018    Sleep apnea     Vaginal delivery     x1       PAST SURGICAL HISTORY:  Past Surgical History:   Procedure Laterality Date    ADENOIDECTOMY      SOFTBO-PFUEOB-GM N/A 2/14/2018    Performed by [facility]    BIOPSY-SENTINEL NODE (95909) Right 7/11/2014    Performed by Kameron Newberry [facility] at [facility] OR    BIOPSY-ULTRASOUND GUIDED CORE Right 6/17/2014    [facility] by [facility]    BREAST BIOPSY Right     x3    BREAST LUMPECTOMY      BREAST SURGERY      lumpectomy right side     CERVIX SURGERY      cone    COLONOSCOPY N/A  2019    [facility]    COLONOSCOPY N/A 2018    Performed by Nura Urbina MD at [facility]    COLONOSCOPY N/A 2017    Performed by Julio Rudd [facility] at [facility]    COLONOSCOPY N/A 3/17/2017    [facility] at [facility] END[facility]    ESOPHAGOGASTRODUODENOSCOPY (EGD) N/A 10/11/2017    Per[facility]udd [facility] at [facility] END[facility]    EYE SURGERY Bilateral 2018    cataract     KDMCOXUVA-YZWK-R-CATH N/A 3/7/2018    Performed by [facility] at [facility] OR    LUMPECTOMY RIGHT BREAST S/P NEEDLE LOCALIZATION  Right 2014    [facility] at [facility] OR    PORTACATH PLACEMENT Right 2018    sweat glands axillary regions Bilateral     TONSILLECTOMY         SOCIAL HISTORY:  Social History     Socioeconomic History    Marital status: Single     Spouse name: Not on file    Number of children: Not on file    Years of education: Not on file    Highest education level: Not on file   Occupational History    Not on file   Social Needs    Financial resource strain: Not on file    Food insecurity:     Worry: Not on file     Inability: Not on file    Transportation needs:     Medical: Not on file     Non-medical: Not on file   Tobacco Use    Smoking status: Former Smoker     Packs/day: 0.25     Years: 50.00     Pack years: 12.50     Types: Cigarettes     Last attempt to quit: 2017     Years since quittin.4    Smokeless tobacco: Never Used   Substance and Sexual Activity    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     Drug use: No    Sexual activity: Never   Lifestyle    Physical activity:     Days per week: Not on file     Minutes per session: Not on file    Stress: Not on file   Relationships    Social connections:     Talks on phone: Not on file     Gets together: Not on file     Attends Muslim service: Not on file     Active member of club or organization: Not on file     Attends meetings of clubs or  organizations: Not on file     Relationship status: Not on file   Other Topics Concern    Not on file   Social History Narrative    Not on file       FAMILY HISTORY:  Family History   Problem Relation Age of Onset    Cancer Mother         breast    Heart disease Mother     Breast cancer Mother     Cancer Father         lung-smoker     Cancer Sister         lung-smoker     Heart attack Sister     Cancer Maternal Grandmother     Heart disease Maternal Grandmother     Cancer Maternal Grandfather     Heart disease Maternal Grandfather     Cancer Paternal Grandmother     Cancer Paternal Grandfather     Cancer Sister         mets not sure where it started     No Known Problems Sister        ALLERGIES AND MEDICATIONS: updated and reviewed.  Review of patient's allergies indicates:  No Known Allergies  Current Outpatient Medications   Medication Sig Dispense Refill    albuterol (PROVENTIL) 2.5 mg /3 mL (0.083 %) nebulizer solution NEBULIZE 1 vial EVERY 6 HOURS AS NEEDED FOR WHEEZING 540 mL 1    albuterol (VENTOLIN HFA) 90 mcg/actuation inhaler INHALE 2 PUFF(S) BY MOUTH EVERY 4 - 6 HOURS AS NEEDED FOR SHORTNESS OF BREATH or WHEEZING 18 g 5    aspirin (ECOTRIN) 325 MG EC tablet Take 325 mg by mouth once daily.      desloratadine (CLARINEX) 5 mg tablet Take 1 tablet (5 mg total) by mouth once daily. 30 tablet 11    fluticasone (FLONASE) 50 mcg/actuation nasal spray USE TWO SPRAYS IN EACH NOSTRIL ONCE A DAY  6    isosorbide mononitrate (IMDUR) 60 MG 24 hr tablet Take 1 tablet (60 mg total) by mouth once daily. 30 tablet 6    metoprolol tartrate (LOPRESSOR) 25 MG tablet Take 1 tablet (25 mg total) by mouth 2 (two) times daily. 180 tablet 2    montelukast (SINGULAIR) 10 mg tablet Take 10 mg by mouth nightly. at bedtime.  5    NITROSTAT 0.4 mg SL tablet place 1 tablet under the tongue AS NEEDED no more than 3 in 15 minutes 25 tablet 0    rosuvastatin (CRESTOR) 20 MG tablet take 1/2 TABLET(S) BY MOUTH ONCE  "A DAY 30 tablet 3    TRELEGY ELLIPTA 100-62.5-25 mcg DsDv INHALE ONE PUFF INTO THE LUNGS ONCE A DAY  5    doxycycline (VIBRA-TABS) 100 MG tablet Take 1 tablet (100 mg total) by mouth 2 (two) times daily. for 7 days 14 tablet 0    promethazine-dextromethorphan (PROMETHAZINE-DM) 6.25-15 mg/5 mL Syrp Take 5 mLs by mouth every 12 (twelve) hours as needed. 180 mL 0     No current facility-administered medications for this visit.        ROS  Review of Systems   Constitutional: Positive for chills. Negative for fever.   HENT: Negative for hearing loss and sore throat.    Eyes: Negative for visual disturbance.   Respiratory: Positive for cough and shortness of breath.    Cardiovascular: Negative for chest pain, palpitations and leg swelling.   Gastrointestinal: Negative for abdominal pain, constipation, diarrhea, nausea and vomiting.   Genitourinary: Negative for dysuria, frequency and urgency.   Musculoskeletal: Negative for arthralgias, joint swelling and myalgias.   Skin: Negative for rash and wound.        Cyst on back   Neurological: Negative for headaches.   Psychiatric/Behavioral: Negative for agitation and confusion. The patient is not nervous/anxious.          OBJECTIVE     Physical Exam  Vitals:    04/18/19 1502   BP: 120/84   Pulse: (!) 58   Temp: 98.3 °F (36.8 °C)    Body mass index is 33.47 kg/m².  Weight: 83 kg (182 lb 15.7 oz)   Height: 5' 2" (157.5 cm)     Physical Exam   Constitutional: She is oriented to person, place, and time. She appears well-developed and well-nourished. No distress.   HENT:   Head: Normocephalic and atraumatic.   Right Ear: External ear normal.   Left Ear: External ear normal.   Nose: Nose normal.   Mouth/Throat: Oropharynx is clear and moist.   Eyes: Conjunctivae and EOM are normal. Right eye exhibits no discharge. Left eye exhibits no discharge. No scleral icterus.   Neck: Normal range of motion. Neck supple. No JVD present. No tracheal deviation present.   Pulmonary/Chest: " Effort normal. No respiratory distress.   Musculoskeletal: Normal range of motion. She exhibits no deformity.   Neurological: She is alert and oriented to person, place, and time. She exhibits normal muscle tone. Coordination normal.   Skin: Skin is warm and dry. No rash noted. No erythema.   ~2.5 x 1.5 cm cyst to the L mid back with erythema and fluctuance; no drainage and mildly TTP   Psychiatric: She has a normal mood and affect. Her behavior is normal. Judgment and thought content normal.         Health Maintenance       Date Due Completion Date    Zoster Vaccine 10/08/2006 ---    Pneumococcal Vaccine (65+ High/Highest Risk) (2 of 2 - PPSV23) 09/05/2017 7/11/2017    Influenza Vaccine 08/01/2018 3/16/2018 (ClinicallyNA)    Override on 3/16/2018: Not Clinically Appropriate (on chemo. discussed with PCP. wait for next season)    Override on 9/20/2016: Done (pulmologist office)    Override on 2/11/2014: Done    TETANUS VACCINE 10/09/2019 (Originally 10/8/1963) ---    Colonoscopy 01/29/2020 1/29/2019    Override on 6/30/2017: Done    DEXA SCAN 03/08/2020 3/8/2017    Override on 3/8/2017: Done    High Dose Statin 03/25/2020 3/25/2019    Mammogram 03/11/2021 3/11/2019    Override on 6/12/2017: Done    Lipid Panel 03/04/2024 3/4/2019            ASSESSMENT     73 y.o. female with     1. Infected epithelial inclusion cyst    2. Cough    3. Squamous cell carcinoma of right lung        PLAN:     1. Infected epithelial inclusion cyst  - Pt advised to take Abx to completion  - doxycycline (VIBRA-TABS) 100 MG tablet; Take 1 tablet (100 mg total) by mouth 2 (two) times daily. for 7 days  Dispense: 14 tablet; Refill: 0  - Incision & Drainage  - Seek medical attention for any worsening erythema with red streaking, edema, drainage, pain/tenderness, fever, chills, or night sweats    2. Cough  - Continue symptomatic treatment with rest, increase fluid intake, tylenol or ibuprofen PRN fever(temp >/= 100.4) or body aches. Okay to take  OTC antihistamines, i.e. Bendaryl, Claritin, Allegra, etc. as needed.  - Okay to gargle with warm, salt water or use throat lozenges as needed  - promethazine-dextromethorphan (PROMETHAZINE-DM) 6.25-15 mg/5 mL Syrp; Take 5 mLs by mouth every 12 (twelve) hours as needed.  Dispense: 180 mL; Refill: 0    3. Squamous cell carcinoma of right lung  - Stable; no acute issues  - Continue management per Heme/Onc        RTC in 1-2 weeks as needed for any acute worsening of current condition or failure to improve        Vera Conrad MD  04/18/2019 3:36 PM        No follow-ups on file.

## 2019-04-24 ENCOUNTER — OFFICE VISIT (OUTPATIENT)
Dept: HEMATOLOGY/ONCOLOGY | Facility: CLINIC | Age: 74
End: 2019-04-24
Payer: MEDICARE

## 2019-04-24 ENCOUNTER — OFFICE VISIT (OUTPATIENT)
Dept: CARDIOLOGY | Facility: CLINIC | Age: 74
End: 2019-04-24
Payer: MEDICARE

## 2019-04-24 ENCOUNTER — LAB VISIT (OUTPATIENT)
Dept: LAB | Facility: HOSPITAL | Age: 74
End: 2019-04-24
Attending: INTERNAL MEDICINE
Payer: MEDICARE

## 2019-04-24 VITALS
OXYGEN SATURATION: 91 % | RESPIRATION RATE: 16 BRPM | WEIGHT: 177.38 LBS | HEART RATE: 84 BPM | SYSTOLIC BLOOD PRESSURE: 112 MMHG | BODY MASS INDEX: 32.44 KG/M2 | DIASTOLIC BLOOD PRESSURE: 64 MMHG

## 2019-04-24 VITALS
WEIGHT: 185.63 LBS | TEMPERATURE: 98 F | OXYGEN SATURATION: 97 % | DIASTOLIC BLOOD PRESSURE: 73 MMHG | HEART RATE: 50 BPM | BODY MASS INDEX: 34.16 KG/M2 | HEIGHT: 62 IN | SYSTOLIC BLOOD PRESSURE: 115 MMHG

## 2019-04-24 DIAGNOSIS — I25.10 CAD (CORONARY ARTERY DISEASE): ICD-10-CM

## 2019-04-24 DIAGNOSIS — Z85.3 HISTORY OF BREAST CANCER: Chronic | ICD-10-CM

## 2019-04-24 DIAGNOSIS — J44.9 CHRONIC OBSTRUCTIVE PULMONARY DISEASE, UNSPECIFIED COPD TYPE: Chronic | ICD-10-CM

## 2019-04-24 DIAGNOSIS — I25.10 CORONARY ARTERY DISEASE INVOLVING NATIVE CORONARY ARTERY OF NATIVE HEART WITHOUT ANGINA PECTORIS: Primary | ICD-10-CM

## 2019-04-24 DIAGNOSIS — C34.91 SQUAMOUS CELL CARCINOMA OF RIGHT LUNG: Chronic | ICD-10-CM

## 2019-04-24 DIAGNOSIS — R73.03 PREDIABETES: ICD-10-CM

## 2019-04-24 DIAGNOSIS — J43.1 PANLOBULAR EMPHYSEMA: ICD-10-CM

## 2019-04-24 DIAGNOSIS — C80.1 CARCINOMA OF UNKNOWN PRIMARY: Primary | ICD-10-CM

## 2019-04-24 DIAGNOSIS — E78.5 HYPERLIPIDEMIA LDL GOAL <70: ICD-10-CM

## 2019-04-24 DIAGNOSIS — I11.9 BENIGN HYPERTENSIVE HEART DISEASE WITHOUT HEART FAILURE: ICD-10-CM

## 2019-04-24 DIAGNOSIS — C34.91 SQUAMOUS CELL CARCINOMA OF RIGHT LUNG: ICD-10-CM

## 2019-04-24 DIAGNOSIS — C80.1 CARCINOMA OF UNKNOWN PRIMARY: ICD-10-CM

## 2019-04-24 LAB
ALBUMIN SERPL BCP-MCNC: 4.1 G/DL (ref 3.5–5.2)
ALP SERPL-CCNC: 48 U/L (ref 55–135)
ALT SERPL W/O P-5'-P-CCNC: 15 U/L (ref 10–44)
ANION GAP SERPL CALC-SCNC: 7 MMOL/L (ref 8–16)
AST SERPL-CCNC: 18 U/L (ref 10–40)
BASOPHILS # BLD AUTO: 0.06 K/UL (ref 0–0.2)
BASOPHILS NFR BLD: 1.1 % (ref 0–1.9)
BILIRUB SERPL-MCNC: 0.4 MG/DL (ref 0.1–1)
BUN SERPL-MCNC: 18 MG/DL (ref 8–23)
CALCIUM SERPL-MCNC: 9.9 MG/DL (ref 8.7–10.5)
CHLORIDE SERPL-SCNC: 103 MMOL/L (ref 95–110)
CO2 SERPL-SCNC: 31 MMOL/L (ref 23–29)
CREAT SERPL-MCNC: 0.8 MG/DL (ref 0.5–1.4)
DIFFERENTIAL METHOD: NORMAL
EOSINOPHIL # BLD AUTO: 0.1 K/UL (ref 0–0.5)
EOSINOPHIL NFR BLD: 2.6 % (ref 0–8)
ERYTHROCYTE [DISTWIDTH] IN BLOOD BY AUTOMATED COUNT: 13.3 % (ref 11.5–14.5)
EST. GFR  (AFRICAN AMERICAN): >60 ML/MIN/1.73 M^2
EST. GFR  (NON AFRICAN AMERICAN): >60 ML/MIN/1.73 M^2
GLUCOSE SERPL-MCNC: 113 MG/DL (ref 70–110)
HCT VFR BLD AUTO: 41.8 % (ref 37–48.5)
HGB BLD-MCNC: 13.6 G/DL (ref 12–16)
LYMPHOCYTES # BLD AUTO: 1.6 K/UL (ref 1–4.8)
LYMPHOCYTES NFR BLD: 29.3 % (ref 18–48)
MCH RBC QN AUTO: 30.5 PG (ref 27–31)
MCHC RBC AUTO-ENTMCNC: 32.5 G/DL (ref 32–36)
MCV RBC AUTO: 94 FL (ref 82–98)
MONOCYTES # BLD AUTO: 0.5 K/UL (ref 0.3–1)
MONOCYTES NFR BLD: 9 % (ref 4–15)
NEUTROPHILS # BLD AUTO: 3.1 K/UL (ref 1.8–7.7)
NEUTROPHILS NFR BLD: 58 % (ref 38–73)
PLATELET # BLD AUTO: 236 K/UL (ref 150–350)
PMV BLD AUTO: 9.7 FL (ref 9.2–12.9)
POTASSIUM SERPL-SCNC: 4.4 MMOL/L (ref 3.5–5.1)
PROT SERPL-MCNC: 7 G/DL (ref 6–8.4)
RBC # BLD AUTO: 4.46 M/UL (ref 4–5.4)
SODIUM SERPL-SCNC: 141 MMOL/L (ref 136–145)
WBC # BLD AUTO: 5.33 K/UL (ref 3.9–12.7)

## 2019-04-24 PROCEDURE — 1101F PR PT FALLS ASSESS DOC 0-1 FALLS W/OUT INJ PAST YR: ICD-10-PCS | Mod: HCNC,CPTII,S$GLB, | Performed by: INTERNAL MEDICINE

## 2019-04-24 PROCEDURE — 99499 UNLISTED E&M SERVICE: CPT | Mod: HCNC,S$GLB,, | Performed by: INTERNAL MEDICINE

## 2019-04-24 PROCEDURE — 80053 COMPREHEN METABOLIC PANEL: CPT | Mod: HCNC

## 2019-04-24 PROCEDURE — 99214 PR OFFICE/OUTPT VISIT, EST, LEVL IV, 30-39 MIN: ICD-10-PCS | Mod: HCNC,S$GLB,, | Performed by: INTERNAL MEDICINE

## 2019-04-24 PROCEDURE — 93000 ELECTROCARDIOGRAM COMPLETE: CPT | Mod: HCNC,S$GLB,, | Performed by: INTERNAL MEDICINE

## 2019-04-24 PROCEDURE — 99999 PR PBB SHADOW E&M-EST. PATIENT-LVL III: ICD-10-PCS | Mod: PBBFAC,HCNC,, | Performed by: INTERNAL MEDICINE

## 2019-04-24 PROCEDURE — 99214 OFFICE O/P EST MOD 30 MIN: CPT | Mod: HCNC,S$GLB,, | Performed by: INTERNAL MEDICINE

## 2019-04-24 PROCEDURE — 99499 RISK ADDL DX/OHS AUDIT: ICD-10-PCS | Mod: HCNC,S$GLB,, | Performed by: INTERNAL MEDICINE

## 2019-04-24 PROCEDURE — 99999 PR PBB SHADOW E&M-EST. PATIENT-LVL III: CPT | Mod: PBBFAC,HCNC,, | Performed by: INTERNAL MEDICINE

## 2019-04-24 PROCEDURE — 93000 EKG 12-LEAD: ICD-10-PCS | Mod: HCNC,S$GLB,, | Performed by: INTERNAL MEDICINE

## 2019-04-24 PROCEDURE — 99999 PR PBB SHADOW E&M-EST. PATIENT-LVL V: CPT | Mod: PBBFAC,HCNC,, | Performed by: INTERNAL MEDICINE

## 2019-04-24 PROCEDURE — 36415 COLL VENOUS BLD VENIPUNCTURE: CPT | Mod: HCNC

## 2019-04-24 PROCEDURE — 99999 PR PBB SHADOW E&M-EST. PATIENT-LVL V: ICD-10-PCS | Mod: PBBFAC,HCNC,, | Performed by: INTERNAL MEDICINE

## 2019-04-24 PROCEDURE — 99215 PR OFFICE/OUTPT VISIT, EST, LEVL V, 40-54 MIN: ICD-10-PCS | Mod: HCNC,S$GLB,, | Performed by: INTERNAL MEDICINE

## 2019-04-24 PROCEDURE — 1101F PT FALLS ASSESS-DOCD LE1/YR: CPT | Mod: HCNC,CPTII,S$GLB, | Performed by: INTERNAL MEDICINE

## 2019-04-24 PROCEDURE — 99215 OFFICE O/P EST HI 40 MIN: CPT | Mod: HCNC,S$GLB,, | Performed by: INTERNAL MEDICINE

## 2019-04-24 PROCEDURE — 85025 COMPLETE CBC W/AUTO DIFF WBC: CPT | Mod: HCNC

## 2019-04-24 NOTE — PROGRESS NOTES
Subjective:    Patient ID:  Ade Castellano is a 73 y.o. female who presents for follow-up of Coronary Artery Disease (6mo )      HPI   Previous history:  Here for follow-up of coronary artery disease.  We received [facility] from [facility] which showed stent in [place] in 2006 as below.  She denies any worsening cardiopulmonary complaints.  She is no longer on chemotherapy for her lung.  She is trying to increase her level of activity.  She underwent diagnostic testing as below.  This was all within normal limits.  She denies any PND, orthopnea or lower extremity edema.  She has not experienced any dizziness, presyncope or syncope.    Today:  Her follow-up coronary artery disease.  She denies any cardiopulmonary complaints.  On questioning she does have some worsening exertional shortness of breath associated with some discomfort across her sternum.  She was recently rediagnosed with likely primary lung cancer and has follow-up with Oncology today.  She denies any other associated symptoms.  She has experienced no PND, orthopnea or lower extremity edema.  She has not experienced any dizziness, presyncope or syncope.    Review of Systems   Constitution: Negative.   HENT: Negative.    Eyes: Negative.    Cardiovascular: Negative for chest pain, dyspnea on exertion, irregular heartbeat, leg swelling, near-syncope, orthopnea, palpitations, paroxysmal nocturnal dyspnea and syncope.   Respiratory: Negative for shortness of breath.    Skin: Negative.    Musculoskeletal: Negative.    Gastrointestinal: Negative for abdominal pain, constipation and diarrhea.   Genitourinary: Negative for dysuria.   Neurological: Negative.    Psychiatric/Behavioral: Negative.         Objective:    Physical Exam   Constitutional: She is oriented to person, place, and time. She appears well-developed and well-nourished.   HENT:   Head: Normocephalic and atraumatic.   Eyes: Pupils are equal, round, and reactive to light. Conjunctivae and  EOM are normal.   Neck: Normal range of motion. Neck supple. No thyromegaly present.   Cardiovascular: Normal rate and regular rhythm.   No murmur heard.  Pulmonary/Chest: Effort normal and breath sounds normal. No respiratory distress.   Abdominal: Soft. Bowel sounds are normal.   Musculoskeletal: She exhibits no edema.   Neurological: She is alert and oriented to person, place, and time.   Skin: Skin is warm and dry.   Psychiatric: She has a normal mood and affect. Her behavior is normal.     NST:  10-18  ·   Normal Lexiscan MPI  · The perfusion scan is free of evidence from myocardial ischemia or injury.  · Visually estimated LV function is normal.     ECHO:  · Left ventricle ejection fraction is normal at 65%  · Mitral valve shows trace regurgitation.  · Tricuspid valve shows trace regurgitation.  · The estimated PA systolic pressure is 27.36 mm Hg  · There is mild valvular aortic stenosis.  · LENORE is 1.87 cm2; peak velocity is 1.98 m/s; mean gradient is 7.42 mmHg.    Lower extremity arterial ultrasound:  · No hemodynamically significant plaque bilaterally  · Normal YIN bilaterally     Carotid ultrasound:  · No hemodynamically significant plaque bilaterally    Outside records reviewed from the [facility]:  Stent to the RCA 2006 in [place]  Heart catheterization 4-15  Left main, lad and left circumflex within normal limits  25% ostial ramus lesion  Overlapping stents in the mid RCA patent    LDL-72   3-19  Hemoglobin A1c -6.1    Assessment:       1. Coronary artery disease involving native coronary artery of native heart without angina pectoris    2. Benign hypertensive heart disease without heart failure    3. Hyperlipidemia LDL goal <70    4. Panlobular emphysema    5. Squamous cell carcinoma of right lung    6. Prediabetes         Plan:       -continue current medical therapy  -reassurance in light of testing  -ace/ARB held with marginal SBP  -diet and exercise tolerated  -follow-up with oncology, stable  from CV standpoint    Return to clinic in 6 months

## 2019-04-24 NOTE — PROGRESS NOTES
Patient, Ade Castellano (MRN #[id number]), presented with a recent Estimated PA Systolic Pressure greater than 40 mmHG consistent with the definition of pulmonary hypertension (ICD10 - I27.0).    Est. PA Systolic Pressure   Date Value Ref Range Status   11/29/2017 54.44 (A)       The patient's pulmonary hypertension was monitored, evaluated, addressed and/or treated. This addendum to the medical record is made on 04/24/2019.

## 2019-04-24 NOTE — PROGRESS NOTES
Subjective:       Patient ID: Ade Castellano is a 73 y.o. female.    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       Pt hospitalized 11/2017   Ms. Castellano was admitted for acute on chronic respiratory failure requiring intubation and ventilation. Has large lung mass in right lung with probable post obstructive pneumonia resulting in COPD exacerbation. But also, patient had ran out of home O2 prior to onset of symptoms, likely contributing to presentation. Initiated on broad spectrum abx, IV steroids, duo-nebs and pulmonology consulted for ventilator co-management. Successfully extubated to BiPAP on 11/30. Then de-escalated to low flow nasal cannula. Abx tailored to augmentin for broad coverage, including anaerobic bacteria /     CTA chest 11/29/2017 reveals   Large right hilar mass with involvement of adjacent segmental pulmonary arterial branches and bronchi with associated postobstructive atelectasis and volume loss in the right middle lobe with adjacent ground glass opacity.  Findings highly concerning for underlying neoplastic process. Mediastinal and axillary adenopathy, with a right axillary lymph node measuring up to 2.0 cm in short axis diameter. No definite evidence of pulmonary thromboembolism.    She is followed by Pulm  She underwent rt axillary LN bx at outside facility- on 1/16/2018 [facility]  Pathology revealed metastatic poorly differentiated carcinoma of unknown primary site  TTF-1 negative, napsin negative  , cytokeratin 7 positive, cytokeratin 20 negative, p63 negative, cytokeratin 5/6 focal dim positivity    She next underwent lung bx at [facility]  "l1/31/2018   Pathology revealed benign lung tissue    She underwent repeat Right Lung Bx 2/14/2018 Pathology reveals Squamous cell carcinoma  PD-L1 10% low expression  EGFR NEG  ALK NEG  BRAF pending  Outside slides axillary LN specimen  requested for review/comparison to lung bx findings       PET/CT  4/19/2018 revealed there has been a excellent response to therapy.  At least 90% reduction in malignant activity.    She s/p  cycle 6 of carboplatin/Abraxane completed 7/2018        PET/CT 2/21/2019 increased size and irregularity of the right axillary lymph node with increased FDG avidity    Recent MAMMO/US rt breast reveals suspicious findings rt axilla LN.  Biopsy of the lymph node measuring 1.4 x 1.1 x 1.3 recommended    Pathology 3/11/2019   FINAL PATHOLOGIC DIAGNOSIS  Right axilla, mass, core biopsy:  Positive for poorly differentiated carcinoma.    Today, she doesn't feel well  She is wearing O2 when she is SOB  Mild LOGAN  She reports improved appetite and weight gain   She reports cough  She reports prescribed antitussive med " too strong"  She reports diffuse arthralgias  She reports minor swelling in rt arm   Appetite and weight stable   Mild fatigue  No hemoptysis     No bleeding-urinary/rectal/nasal           PrevHx:She had an abnormal mammogram 6/4/2014 which  Revealed a round solid mass 6mm in rt breast.  She then underwent U/S guided core bx of rt breast mass on 6/17/2014.  Pathology revealed infiltrating ductal carcinoma Grade 3 with tumor  Present in thin-walled spaces suggestive of lymphatic spaces. Hormone  Receptor status on tumor specimen revealed ER negative 0% ,  LA negative 0% and Her 2 jose maria negative.  She subsequently underwent rt segmental mastectomy and SLN bx  On 7/11/2014. Pathology of rt breast lumpectomy revealed invasive  Ductal carcinoma with micropapillary pattern ( Invasive micropapillary  Ca) with max tumor dimension 11.5mm with suggestion of tumor in   Thin walled spaces c/w " "lymphovascular involvement. Surgical  Margins free of tumor, grade 3, ER/NC neg , Her 2 jose maria neg   With [place] Lymph node negative for Neoplasm.   Pathologic staging [id number](i-)  Her mother was diagnosed with breast cancer in her 50's/  She has no family history of ovarian cancer.           Review of Systems   Constitutional: Positive for fatigue. Negative for appetite change, fever and unexpected weight change.   HENT: Negative for mouth sores and rhinorrhea.    Eyes: Negative for visual disturbance.   Respiratory: Positive for cough and shortness of breath.    Cardiovascular: Negative for chest pain.   Gastrointestinal: Negative for abdominal pain and diarrhea.   Genitourinary: Negative for frequency.   Musculoskeletal: Positive for arthralgias. Negative for back pain.   Skin: Negative for rash.   Neurological: Negative for dizziness and headaches.   Hematological: Negative for adenopathy.   Psychiatric/Behavioral: The patient is not nervous/anxious.        Objective:        Vitals:    04/24/19 0940   BP: 115/73   BP Location: Left arm   Patient Position: Sitting   BP Method: Large (Automatic)   Pulse: (!) 50   Temp: 97.6 °F (36.4 °C)   TempSrc: Oral   SpO2: 97%   Weight: 84.2 kg (185 lb 10 oz)   Height: 5' 2" (1.575 m)         Physical Exam   Constitutional: She is oriented to person, place, and time. She appears well-developed and well-nourished.   HENT:   Head: Normocephalic.   Mouth/Throat: Oropharynx is clear and moist. No oropharyngeal exudate.   Eyes: Pupils are equal, round, and reactive to light. Conjunctivae and lids are normal. No scleral icterus.   Neck: Normal range of motion. Neck supple. No thyromegaly present.   Cardiovascular: Normal rate, regular rhythm and normal heart sounds.   No murmur heard.  Pulmonary/Chest: Effort normal and breath sounds normal. She has no wheezes. Right breast exhibits no inverted nipple, no mass, no nipple discharge and no skin change. Left breast exhibits no inverted " nipple, no mass, no nipple discharge and no skin change.   Abdominal: Soft. Bowel sounds are normal. There is no tenderness. There is no rebound and no guarding.   Musculoskeletal: Normal range of motion. She exhibits no edema or tenderness.   Lymphadenopathy:     She has no cervical adenopathy.     She has axillary adenopathy.        Right: No supraclavicular adenopathy present.        Left: No supraclavicular adenopathy present.   Neurological: She is alert and oriented to person, place, and time. No cranial nerve deficit. Coordination normal.   Skin: Skin is warm and dry. No ecchymosis, no petechiae and no rash noted. No erythema.   Psychiatric: She has a normal mood and affect.             CT a/p w/contrast 11/29/2018   1. No acute abnormality identified within the abdomen and pelvis.    2.  There are a few nonspecific prominent lymph nodes in the upper abdomen including a periesophageal lymph node which measures 1.0 cm in short axis diameter.    3.  Significant abdominal aortic atherosclerosis and abdominal aorta ectasia.    4.  Additional findings as above.    PET/CT 1/26/2018   1.  Intense FDG uptake within the known right infrahilar lung mass, compatible with malignancy.  It is unclear if this represents primary lung malignancy or metastatic breast cancer.    2.  Intensely hypermetabolic right axillary, retropectoral, and lower right cervical lymph nodes, compatible with metastases.    3.  Abnormal FDG uptake along right breast skin thickening, new from prior chest CTA and mammogram.  This may relate to localized edema/inflammation, though correlation with physical exam and mammography are recommended to exclude underlying inflammatory carcinoma.      MRI Brain w w/out contrast 2/9/2018  1.  No evidence of intracranial metastases.  2.  Sinus disease       Rt axillary LN bx at outside facility- on 1/16/2018 at [facility]  Pathology revealed metastatic poorly differentiated carcinoma of unknown  primary  site 1/16/2018 at [facility]  TTF-1 negative, napsin negative  , cytokeratin 7 positive, cytokeratin 20 negative, p63 negative, cytokeratin 5/6 focal dim positivity    LUNG BIOPSY 1/31/2017   Pathology revealed benign lung tissue         SPECIMEN  1) Right Lung Bx 2/14/2018  Supplemental Diagnosis  Immunohistochemical stains show strong nuclear staining for p63 in essentially all tumor cells and very strong  cytoplasmic and membrane staining for CK5/6, also in essentially all tumor cells. TTF-1 and CK7 are negative  within tumor cells but do stain native pulmonary elements present within the biopsy. A stain for mucicarmine is  negative. Positive and negative controls function appropriately.  Final diagnosis: Specimen submitted as right lung biopsy  -Squamous cell carcinoma  (Electronically Signed: 2018-02-20 09:12:07 )  Diagnosed by: Phong Thompson  FINAL PATHOLOGIC DIAGNOSIS  Fragments of pulmonary parenchyma (submitted as right lung biopsy):    FINAL PATHOLOGIC DIAGNOSIS 1. Lymph node, right axilla, biopsy, review of 10 outside slides [facility], JS [phone],  collected January 11, 2018: Metastatic poorly differentiated carcinoma (see comment).  The histologic section shows fibrous stroma infiltrated by nest of poorly differentiated malignant cells including  occasional dyskeratotic cells morphologically suggestive of metastatic squamous cell carcinoma.  Immunohistochemical stains are nonspecific; the cells are positive for cytokeratin 7 and cytokeratin 5/6 (focal) and  negative for cytokeratin 20, TTF-1, p63, GCDFP, mammoglobin, and CEA        PET/CT 2/21/2019  Increased size and irregularity of the right axillary lymph node with increased FDG avidity.  Although this would be atypical in location for lung carcinoma, the increased size and avidity must be concerning for metastatic disease in this patient with history of squamous cell lung cancer.     US Rt breast and mammo  2/26/2019  Impression:  Right  Lymph Node: Right axilla lymph node. Assessment: 4 - Suspicious finding. Biopsy is recommended.      BI-RADS Category:   Right: 4 - Suspicious  Overall: 4 - Suspicious     Recommendation:  Biopsy is recommended. Biopsy of the lymph node measuring 1.4 x 1.1 x 1.3 recommended , the one with the round shape configuration, corresponding to the most recent PET CT finding.         Pathology 3/11/2019   FINAL PATHOLOGIC DIAGNOSIS  Right axilla, mass, core biopsy:  Positive for poorly differentiated carcinoma.  See comment.  Comment:  The biopsy from the right axilla mass shows a poorly differentiated carcinoma in background lymphoid tissue  with enlarged cells showing increased nuclear size, prominent nucleoli, moderate amounts of cytoplasm and mitotic  figures. Immunostains are completed and reveal the tumor cells to stain positively with cytokeratin AE 1/AE3, CK  5/6 and CK 7. The tumor cells are negative for CK 20, Estrogen receptor, p63 and TTF-1. All stains have  satisfactory positive and negative controls. The patient's prior cases will be reviewed and an additional stain for  [id number] is pending in an attempt to pinpoint the primary site of this malignancy and results will follow in a  supplemental report.            Assessment:       1.  Poorly differentiated Carcinoma of unknown primary    2. History of breast cancer    3. Squamous cell carcinoma of right lung    4. Chronic obstructive pulmonary disease, unspecified COPD type        Plan:   ECOG 0  1-4  Pt with widely metastatic squamous cell CA lung     Pt with hx of Stage 1A invasive ductal carcinoma rt breast s/p rt segmental mastectomy and SLN bx 7/11/2014 [facility] neg HER 2 jose maria neg [id number](i-).  S/p adjuvant RT completed 9/2014 Pt declined adjuvant chemo  Follow-up Mammo 6/12/2017 No mammographic evidence of malignancy.  Pt  hospitalized 11/2017 with acute-on-chronic  resp failure  Abnormal CT imaging revealing Large right hilar mass  with involvement of  adjacent segmental pulmonary arterial branches and bronchi with associated postobstructive atelectasis  and involvement of mediastinal and axillary adenopathy   S/p rt axillary LN bx ( outside facility) - metastatic poorly differentiated carcinoma of unknown primary  site  status post  lung biopsy 1/31/2017 -benign lung tissue  PET/CT 1/26/2018   Intense FDG uptake within the known right infrahilar lung mass, compatible with malignancy.   Intensely hypermetabolic right axillary, retropectoral, and lower right cervical lymph nodes, compatible with metastases.  Abnormal FDG uptake along right breast skin thickening, new from prior chest CTA and mammogram.    She  underwent lung bx at [facility] l1/31/2018   Pathology revealed benign lung tissue    Repeat lung bx 2/14/2018 reveals Squamous Cell CA lung  PD-L1 10% low expression  EGFR NEG  ALK NEG      She s/p  cycle 6 of carboplatin/Abraxane Day1 completed 7/2/2018 ( day 15 held due to prolonged cytopenias)      Recent PET/CT 2/21/2019 increased size and irregularity of the right axillary lymph node with increased FDG avidity    Recent MAMMO/US rt breast reveals suspicious findings rt axilla LN.  Biopsy of the lymph node measuring 1.4 x 1.1 x 1.3     Pt s/p  rt axillary LN bx  Pathology 3/11/2019   FINAL PATHOLOGIC DIAGNOSIS  Right axilla, mass, core biopsy:  Positive for poorly differentiated carcinoma.  Awaiting further immunostaining and comparison to prior pathology findings    Discussed pathology findings in detail with interpreting pathologist.  Difficult to determine primary lung versus breast based on additional IHC staining however breast is favored.  Further testing sent for cancer type ID and results have revealed molecular diagnosis testing revealed head and neck salivary gland carcinoma probability 84% breast adenocarcinoma could not be excluded with a 12% probability    Plan reimaging and then treatment plan as primary remains  unclear      D/w Pathologist    Follow-up   1 mo with cbc,cmp prior to f/u   Cont PAC flush prn      Follow-up in 2 weeks     45 minutes spent during this visit of which greater than 50% devoted to counseling and coordination of care regarding diagnosis and management plan.    Cc: Swetha Katz M.D.         MD Tory Roberson MD

## 2019-04-26 ENCOUNTER — HOSPITAL ENCOUNTER (OUTPATIENT)
Dept: RADIOLOGY | Facility: HOSPITAL | Age: 74
Discharge: HOME | End: 2019-04-26
Attending: INTERNAL MEDICINE
Payer: MEDICARE

## 2019-04-26 DIAGNOSIS — C80.1 CARCINOMA OF UNKNOWN PRIMARY: ICD-10-CM

## 2019-04-26 DIAGNOSIS — Z85.3 HISTORY OF BREAST CANCER: Chronic | ICD-10-CM

## 2019-04-26 PROCEDURE — 70491 CT SOFT TISSUE NECK WITH CONTRAST: ICD-10-PCS | Mod: 26,HCNC,, | Performed by: RADIOLOGY

## 2019-04-26 PROCEDURE — 71260 CT THORAX DX C+: CPT | Mod: 26,HCNC,, | Performed by: RADIOLOGY

## 2019-04-26 PROCEDURE — 74177 CT CHEST ABDOMEN PELVIS WITH CONTRAST (XPD): ICD-10-PCS | Mod: 26,HCNC,, | Performed by: RADIOLOGY

## 2019-04-26 PROCEDURE — 70491 CT SOFT TISSUE NECK W/DYE: CPT | Mod: 26,HCNC,, | Performed by: RADIOLOGY

## 2019-04-26 PROCEDURE — 78306 BONE IMAGING WHOLE BODY: CPT | Mod: 26,HCNC,, | Performed by: RADIOLOGY

## 2019-04-26 PROCEDURE — 71260 CT CHEST ABDOMEN PELVIS WITH CONTRAST (XPD): ICD-10-PCS | Mod: 26,HCNC,, | Performed by: RADIOLOGY

## 2019-04-26 PROCEDURE — 74177 CT ABD & PELVIS W/CONTRAST: CPT | Mod: TC,HCNC

## 2019-04-26 PROCEDURE — 74177 CT ABD & PELVIS W/CONTRAST: CPT | Mod: 26,HCNC,, | Performed by: RADIOLOGY

## 2019-04-26 PROCEDURE — 25500020 PHARM REV CODE 255: Mod: HCNC | Performed by: INTERNAL MEDICINE

## 2019-04-26 PROCEDURE — A9503 TC99M MEDRONATE: HCPCS | Mod: HCNC

## 2019-04-26 PROCEDURE — 78306 NM BONE SCAN WHOLE BODY: ICD-10-PCS | Mod: 26,HCNC,, | Performed by: RADIOLOGY

## 2019-04-26 PROCEDURE — 70491 CT SOFT TISSUE NECK W/DYE: CPT | Mod: TC,HCNC

## 2019-04-26 RX ADMIN — IOHEXOL 15 ML: 300 INJECTION, SOLUTION INTRAVENOUS at 07:04

## 2019-04-26 RX ADMIN — IOHEXOL 100 ML: 350 INJECTION, SOLUTION INTRAVENOUS at 09:04

## 2019-05-02 ENCOUNTER — TELEPHONE (OUTPATIENT)
Dept: HEMATOLOGY/ONCOLOGY | Facility: CLINIC | Age: 74
End: 2019-05-02

## 2019-05-02 NOTE — TELEPHONE ENCOUNTER
Per , called pt to cancel tomorrow's appt. Her &  have discussed patient & are requesting path to send all slides to the [facility]. She will see pt back when path has been reread. Pt understands.

## 2019-05-08 ENCOUNTER — INFUSION (OUTPATIENT)
Dept: INFUSION THERAPY | Facility: HOSPITAL | Age: 74
End: 2019-05-08
Attending: PHYSICIAN ASSISTANT
Payer: MEDICARE

## 2019-05-08 DIAGNOSIS — C34.90 SQUAMOUS CELL CARCINOMA LUNG: Primary | ICD-10-CM

## 2019-05-08 PROCEDURE — 25000003 PHARM REV CODE 250: Mod: HCNC | Performed by: INTERNAL MEDICINE

## 2019-05-08 PROCEDURE — 63600175 PHARM REV CODE 636 W HCPCS: Mod: HCNC | Performed by: INTERNAL MEDICINE

## 2019-05-08 PROCEDURE — 96523 IRRIG DRUG DELIVERY DEVICE: CPT | Mod: HCNC

## 2019-05-08 PROCEDURE — A4216 STERILE WATER/SALINE, 10 ML: HCPCS | Mod: HCNC | Performed by: INTERNAL MEDICINE

## 2019-05-08 RX ORDER — HEPARIN 100 UNIT/ML
500 SYRINGE INTRAVENOUS
Status: CANCELLED | OUTPATIENT
Start: 2019-05-08

## 2019-05-08 RX ORDER — SODIUM CHLORIDE 0.9 % (FLUSH) 0.9 %
10 SYRINGE (ML) INJECTION
Status: CANCELLED | OUTPATIENT
Start: 2019-05-08

## 2019-05-08 RX ORDER — SODIUM CHLORIDE 0.9 % (FLUSH) 0.9 %
10 SYRINGE (ML) INJECTION
Status: DISCONTINUED | OUTPATIENT
Start: 2019-05-08 | End: 2019-05-08 | Stop reason: HOSPADM

## 2019-05-08 RX ORDER — HEPARIN 100 UNIT/ML
500 SYRINGE INTRAVENOUS
Status: DISCONTINUED | OUTPATIENT
Start: 2019-05-08 | End: 2019-05-08 | Stop reason: HOSPADM

## 2019-05-08 RX ADMIN — HEPARIN SODIUM (PORCINE) LOCK FLUSH IV SOLN 100 UNIT/ML 500 UNITS: 100 SOLUTION at 08:05

## 2019-05-08 RX ADMIN — Medication 10 ML: at 08:05

## 2019-05-31 ENCOUNTER — OFFICE VISIT (OUTPATIENT)
Dept: HEMATOLOGY/ONCOLOGY | Facility: CLINIC | Age: 74
End: 2019-05-31
Payer: MEDICARE

## 2019-05-31 VITALS
SYSTOLIC BLOOD PRESSURE: 113 MMHG | HEART RATE: 50 BPM | TEMPERATURE: 98 F | BODY MASS INDEX: 33.71 KG/M2 | OXYGEN SATURATION: 97 % | HEIGHT: 62 IN | WEIGHT: 183.19 LBS | DIASTOLIC BLOOD PRESSURE: 87 MMHG

## 2019-05-31 DIAGNOSIS — Z85.3 HISTORY OF BREAST CANCER: ICD-10-CM

## 2019-05-31 DIAGNOSIS — R59.0 AXILLARY LYMPHADENOPATHY: ICD-10-CM

## 2019-05-31 DIAGNOSIS — C34.91 SQUAMOUS CELL CARCINOMA OF RIGHT LUNG: Primary | Chronic | ICD-10-CM

## 2019-05-31 DIAGNOSIS — C50.919 RECURRENT BREAST CANCER, UNSPECIFIED LATERALITY: ICD-10-CM

## 2019-05-31 PROCEDURE — 99215 OFFICE O/P EST HI 40 MIN: CPT | Mod: HCNC,S$GLB,, | Performed by: INTERNAL MEDICINE

## 2019-05-31 PROCEDURE — 99999 PR PBB SHADOW E&M-EST. PATIENT-LVL IV: CPT | Mod: PBBFAC,HCNC,, | Performed by: INTERNAL MEDICINE

## 2019-05-31 PROCEDURE — 99999 PR PBB SHADOW E&M-EST. PATIENT-LVL IV: ICD-10-PCS | Mod: PBBFAC,HCNC,, | Performed by: INTERNAL MEDICINE

## 2019-05-31 PROCEDURE — 1101F PR PT FALLS ASSESS DOC 0-1 FALLS W/OUT INJ PAST YR: ICD-10-PCS | Mod: HCNC,CPTII,S$GLB, | Performed by: INTERNAL MEDICINE

## 2019-05-31 PROCEDURE — 99499 RISK ADDL DX/OHS AUDIT: ICD-10-PCS | Mod: HCNC,S$GLB,, | Performed by: INTERNAL MEDICINE

## 2019-05-31 PROCEDURE — 99215 PR OFFICE/OUTPT VISIT, EST, LEVL V, 40-54 MIN: ICD-10-PCS | Mod: HCNC,S$GLB,, | Performed by: INTERNAL MEDICINE

## 2019-05-31 PROCEDURE — 1101F PT FALLS ASSESS-DOCD LE1/YR: CPT | Mod: HCNC,CPTII,S$GLB, | Performed by: INTERNAL MEDICINE

## 2019-05-31 PROCEDURE — 99499 UNLISTED E&M SERVICE: CPT | Mod: HCNC,S$GLB,, | Performed by: INTERNAL MEDICINE

## 2019-05-31 NOTE — PROGRESS NOTES
Subjective:       Patient ID: Ade Castellano is a 73 y.o. female.    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   Pathology showed a 1.15 cm, grade 3 infiltrating ductal carcinoma.  One sentinel lymph node was negative for malignancy.  Margins were negative and the closest margin was 1 cm.  She was staged a little pT1c little pN0 cM0 stage IA.  She declined adjuvant chemo therapy.  She completed post operative radiation therapy at 400 cGy per fraction to 4000 cGy 9/2014         Pt hospitalized 11/2017   Ms. Castellano was admitted for acute on chronic respiratory failure requiring intubation and ventilation. Has large lung mass in right lung with probable post obstructive pneumonia resulting in COPD exacerbation. But also, patient had ran out of home O2 prior to onset of symptoms, likely contributing to presentation. Initiated on broad spectrum abx, IV steroids, duo-nebs and pulmonology consulted for ventilator co-management. Successfully extubated to BiPAP on 11/30. Then de-escalated to low flow nasal cannula. Abx tailored to augmentin for broad coverage, including anaerobic bacteria /     CTA chest 11/29/2017 revealed   Large right hilar mass with involvement of adjacent segmental pulmonary arterial branches and bronchi with associated postobstructive atelectasis and volume loss in the right middle lobe with adjacent ground glass opacity.  Findings highly concerning for underlying neoplastic process. Mediastinal and axillary adenopathy, with a right axillary lymph node measuring up to 2.0 cm in short axis diameter. No definite evidence of pulmonary thromboembolism.    She is followed by Pulmonology   She underwent rt axillary LN bx at  outside facility- on 1/16/2018 at [facility]  Pathology revealed metastatic poorly differentiated carcinoma of unknown primary site  TTF-1 negative, napsin negative  , cytokeratin 7 positive, cytokeratin 20 negative, p63 negative, cytokeratin 5/6 focal dim positivity    She next underwent lung bx at [facility] l1/31/2018   Pathology revealed benign lung tissue    She underwent Repeat Right Lung Bx 2/14/2018 Pathology reveals Squamous cell carcinoma  PD-L1 10% low expression  EGFR NEG  ALK NEG  BRAF NEG      Outside slides axillary LN specimen  requested for review/comparison to lung bx findings     1) Right Lung Bx 2/14/2018  Supplemental Diagnosis  Immunohistochemical stains show strong nuclear staining for p63 in essentially all tumor cells and very strong  cytoplasmic and membrane staining for CK5/6, also in essentially all tumor cells. TTF-1 and CK7 are negative  within tumor cells but do stain native pulmonary elements present within the biopsy. A stain for mucicarmine is  negative. Positive and negative controls function appropriately.  Final diagnosis: Specimen submitted as right lung biopsy  -Squamous cell carcinoma  (Electronically Signed: 2018-02-20 09:12:07 )  Diagnosed by: Phong Thompson  FINAL PATHOLOGIC DIAGNOSIS  Fragments of pulmonary parenchyma (submitted as right lung biopsy):    FINAL PATHOLOGIC DIAGNOSIS 1. Lymph node, right axilla, biopsy, review of 10 outside slides [facility], JS [id number] [id number],  collected January 11, 2018: Metastatic poorly differentiated carcinoma (see comment).  The histologic section shows fibrous stroma infiltrated by nest of poorly differentiated malignant cells including  occasional dyskeratotic cells morphologically suggestive of metastatic squamous cell carcinoma.  Immunohistochemical stains are nonspecific; the cells are positive for cytokeratin 7 and cytokeratin 5/6 (focal) and  negative for cytokeratin 20, TTF-1, p63, GCDFP, mammoglobin, and  CEA      PET/CT  4/19/2018 revealed there has been a excellent response to therapy.  At least 90% reduction in malignant activity.    She s/p  cycle 6 of carboplatin/Abraxane completed 7/2018        PET/CT 2/21/2019 increased size and irregularity of the right axillary lymph node with increased FDG avidity     MAMMO/US rt breast 2/2019 revealed suspicious findings rt axilla LN.  Biopsy of the lymph node measuring 1.4 x 1.1 x 1.3 recommended    Pathology 3/11/2019   FINAL PATHOLOGIC DIAGNOSIS  Right axilla, mass, core biopsy:  Positive for poorly differentiated carcinoma.  he staining profile of the current axillary mass match more closely with the previous  breast carcinoma (due to the p63 negativity in both the 2014 breast cancer and the current axillary mass lesion but  p63 positivity in the lung mass from 2018).  This staining profile, together with the comparison with the patient's prior breast tumor and prior lung tumor supports  a diagnosis of poorly differentiated carcinoma and the malignancy appears morphologically similar to the prior  malignancies from both sites, but the staining profile in this small sample from the axillary mass more closely  correlates with the prior breast malignancy. Pathologic subclassification of this malignancy's primary location is not  definitive and clinical correlation is recommended definitively decide on possible primary location in this patient's  axillary mass sample.  Supplemental (2):  Additional immunohistochemical staining for progesterone receptor and HER2 are completed per clinician request  with following results:  Progesterone receptor: Negative; 0% nuclear tumor cell staining.  HER2: Negative; stain score = 0.  Supplemental (3):    Slides sent to [facility] for outside review  It was determined that agree with  interpretation of this case. In my opinion, the lung tumor corresponds to a squamous cell  carcinoma and appears to be unrelated to the invasive ductal  carcinoma of the breast. The axillary mass, in my  opinion, corresponds to metastases of the breast primary. Although it is a poorly differentiated carcinoma, the  immunophenotype is most consistent with the one that the breast primary exhibited, and is inconsistent with a  metastatic squamous cell carcinoma. I      Further testing sent for cancer type ID also sent and results have revealed molecular diagnosis testing revealed head and neck salivary gland carcinoma probability 84% breast adenocarcinoma could not be excluded with a 12% probability      Today, she has no new issues  Mild LOGAN  She reports improved appetite and weight gain   She reports cough  She has diffuse arthralgias-stable  She reports minor swelling in rt arm   Mild fatigue  No hemoptysis   No bleeding-urinary/rectal/nasal          PrevHx:She had an abnormal mammogram 6/4/2014 which  Revealed a round solid mass 6mm in rt breast.  She then underwent U/S guided core bx of rt breast mass on 6/17/2014.  Pathology revealed infiltrating ductal carcinoma Grade 3 with tumor  Present in thin-walled spaces suggestive of lymphatic spaces. Hormone  Receptor status on tumor specimen revealed ER negative 0% ,  MO negative 0% and Her 2 jose maria negative.  She subsequently underwent rt segmental mastectomy and SLN bx  On 7/11/2014. Pathology of rt breast lumpectomy revealed invasive  Ductal carcinoma with micropapillary pattern ( Invasive micropapillary  Ca) with max tumor dimension 11.5mm with suggestion of tumor in   Thin walled spaces c/w lymphovascular involvement. Surgical  Margins free of tumor, grade 3, ER/MO neg , Her 2 jose maria neg   With [place] Lymph node negative for Neoplasm.   Pathologic staging [id number](i-)  Her mother was diagnosed with breast cancer in her 50's/  She has no family history of ovarian cancer.           Review of Systems   Constitutional: Positive for fatigue (mild). Negative for appetite change, fever and unexpected weight change.   HENT:  "Negative for mouth sores and rhinorrhea.    Eyes: Negative for visual disturbance.   Respiratory: Positive for cough and shortness of breath (mild).    Cardiovascular: Negative for chest pain.   Gastrointestinal: Negative for abdominal pain and diarrhea.   Genitourinary: Negative for frequency.   Musculoskeletal: Positive for arthralgias. Negative for back pain.   Skin: Negative for rash.   Neurological: Negative for dizziness and headaches.   Hematological: Negative for adenopathy.   Psychiatric/Behavioral: The patient is not nervous/anxious.        Objective:        Vitals:    05/31/19 0859   BP: 113/87   BP Location: Left arm   Patient Position: Sitting   BP Method: Large (Automatic)   Pulse: (!) 50   Temp: 97.6 °F (36.4 °C)   TempSrc: Oral   SpO2: 97%   Weight: 83.1 kg (183 lb 3.2 oz)   Height: 5' 2" (1.575 m)         Physical Exam   Constitutional: She is oriented to person, place, and time. She appears well-developed and well-nourished.   HENT:   Head: Normocephalic.   Mouth/Throat: Oropharynx is clear and moist. No oropharyngeal exudate.   Eyes: Pupils are equal, round, and reactive to light. Conjunctivae and lids are normal. No scleral icterus.   Neck: Normal range of motion. Neck supple. No thyromegaly present.   Cardiovascular: Normal rate, regular rhythm and normal heart sounds.   No murmur heard.  Pulmonary/Chest: Effort normal and breath sounds normal. She has no wheezes. Right breast exhibits no inverted nipple, no mass, no nipple discharge and no skin change. Left breast exhibits no inverted nipple, no mass, no nipple discharge and no skin change.   Abdominal: Soft. Bowel sounds are normal. There is no tenderness. There is no rebound and no guarding.   Musculoskeletal: Normal range of motion. She exhibits no edema or tenderness.   Lymphadenopathy:     She has no cervical adenopathy.     She has axillary adenopathy.        Right: No supraclavicular adenopathy present.        Left: No supraclavicular " adenopathy present.   Neurological: She is alert and oriented to person, place, and time. No cranial nerve deficit. Coordination normal.   Skin: Skin is warm and dry. No ecchymosis, no petechiae and no rash noted. No erythema.   Psychiatric: She has a normal mood and affect.             CT a/p w/contrast 11/29/2018   1. No acute abnormality identified within the abdomen and pelvis.    2.  There are a few nonspecific prominent lymph nodes in the upper abdomen including a periesophageal lymph node which measures 1.0 cm in short axis diameter.    3.  Significant abdominal aortic atherosclerosis and abdominal aorta ectasia.    4.  Additional findings as above.    PET/CT 1/26/2018   1.  Intense FDG uptake within the known right infrahilar lung mass, compatible with malignancy.  It is unclear if this represents primary lung malignancy or metastatic breast cancer.    2.  Intensely hypermetabolic right axillary, retropectoral, and lower right cervical lymph nodes, compatible with metastases.    3.  Abnormal FDG uptake along right breast skin thickening, new from prior chest CTA and mammogram.  This may relate to localized edema/inflammation, though correlation with physical exam and mammography are recommended to exclude underlying inflammatory carcinoma.      MRI Brain w w/out contrast 2/9/2018  1.  No evidence of intracranial metastases.  2.  Sinus disease       Rt axillary LN bx at outside facility- on 1/16/2018 at [facility]  Pathology revealed metastatic poorly differentiated carcinoma of unknown primary  site 1/16/2018 at [facility]  TTF-1 negative, napsin negative  , cytokeratin 7 positive, cytokeratin 20 negative, p63 negative, cytokeratin 5/6 focal dim positivity    LUNG BIOPSY 1/31/2018  Pathology revealed benign lung tissue         SPECIMEN  1) Right Lung Bx 2/14/2018  Supplemental Diagnosis  Immunohistochemical stains show strong nuclear staining for p63 in essentially all tumor cells  and very strong  cytoplasmic and membrane staining for CK5/6, also in essentially all tumor cells. TTF-1 and CK7 are negative  within tumor cells but do stain native pulmonary elements present within the biopsy. A stain for mucicarmine is  negative. Positive and negative controls function appropriately.  Final diagnosis: Specimen submitted as right lung biopsy  -Squamous cell carcinoma  (Electronically Signed: 2018-02-20 09:12:07 )  Diagnosed by: Phong Thompson  FINAL PATHOLOGIC DIAGNOSIS  Fragments of pulmonary parenchyma (submitted as right lung biopsy):    FINAL PATHOLOGIC DIAGNOSIS 1. Lymph node, right axilla, biopsy, review of 10 outside slides [facility], JS [id number] [id number],  collected January 11, 2018: Metastatic poorly differentiated carcinoma (see comment).  The histologic section shows fibrous stroma infiltrated by nest of poorly differentiated malignant cells including  occasional dyskeratotic cells morphologically suggestive of metastatic squamous cell carcinoma.  Immunohistochemical stains are nonspecific; the cells are positive for cytokeratin 7 and cytokeratin 5/6 (focal) and  negative for cytokeratin 20, TTF-1, p63, GCDFP, mammoglobin, and CEA        PET/CT 2/21/2019  Increased size and irregularity of the right axillary lymph node with increased FDG avidity.  Although this would be atypical in location for lung carcinoma, the increased size and avidity must be concerning for metastatic disease in this patient with history of squamous cell lung cancer.     US Rt breast and mammo 2/26/2019  Impression:  Right  Lymph Node: Right axilla lymph node. Assessment: 4 - Suspicious finding. Biopsy is recommended.      BI-RADS Category:   Right: 4 - Suspicious  Overall: 4 - Suspicious     Recommendation:  Biopsy is recommended. Biopsy of the lymph node measuring 1.4 x 1.1 x 1.3 recommended , the one with the round shape configuration, corresponding to the most recent PET CT finding.         Pathology  3/11/2019   FINAL PATHOLOGIC DIAGNOSIS  Right axilla, mass, core biopsy:  Positive for poorly differentiated carcinoma.  See comment.  Comment:  The biopsy from the right axilla mass shows a poorly differentiated carcinoma in background lymphoid tissue  with enlarged cells showing increased nuclear size, prominent nucleoli, moderate amounts of cytoplasm and mitotic  figures. Immunostains are completed and reveal the tumor cells to stain positively with cytokeratin AE 1/AE3, CK  5/6 and CK 7. The tumor cells are negative for CK 20, Estrogen receptor, p63 and TTF-1. All stains have  satisfactory positive and negative controls. The patient's prior cases will be reviewed and an additional stain for  JUAREZ-3 is pending in an attempt to pinpoint the primary site of this malignancy and results will follow in a  supplemental report.      Supplemental Diagnosis  3/11/2019  The current axillary mass is compared to the patient's prior right lung biopsy (case number [id number]) and the  current axillary mass is morphologically similar to the lung malignancy. Also, the current axillary mass is compared  to the patient's prior breast resection (Case number [id number]) and appears similar as well. P63 is negative in the  [id number] tumor and CK 5/6 shows scattered positive staining in the [id number] tumor.  Immunostain for JUAREZ-3 is completed and shows only rare weak to moderate staining within the tumor cell nuclei  with satisfactory positive and negative controls. This stain is positive in the surrounding lymphocytes. This staining  pattern is non-specific and does not definitively differentiate between a lung and breast primary malignancy in this  right axilla mass biopsy. The staining profile of the current axillary mass match more closely with the previous  breast carcinoma (due to the p63 negativity in both the 2014 breast cancer and the current axillary mass lesion but  p63 positivity in the lung mass from 2018).  This staining  profile, together with the comparison with the patient's prior breast tumor and prior lung tumor supports  a diagnosis of poorly differentiated carcinoma and the malignancy appears morphologically similar to the prior  malignancies from both sites, but the staining profile in this small sample from the axillary mass more closely  correlates with the prior breast malignancy. Pathologic subclassification of this malignancy's primary location is not  definitive and clinical correlation is recommended definitively decide on possible primary location in this patient's  axillary mass sample.  Supplemental (2):  Additional immunohistochemical staining for progesterone receptor and HER2 are completed per clinician request  with following results:  Progesterone receptor: Negative; 0% nuclear tumor cell staining.  HER2: Negative; stain score = 0.  Supplemental (3):  [place] DIAGNOSIS:  FINAL DIAGNOSIS  Breast, right, needle core biopsy ([id number]; 06/17/2014): Invasive ductal carcinoma, Jaiden grade III (of  III), with focal micropapillary features.  Immunohistochemical stains performed at the referring institution show that the tumor cells have the following  phenotype: - Estrogen receptor: Negative (0% tumor cells staining). - Progesterone receptor: Negative (0% tumor  cells staining). - HER2: Negative (score 0).  Lymph nodes, right, sentinel biopsy and lumpectomy ([id number]; 07/11/2014):  1. Right sentinel lymph node: A single (1) lymph node is negative for metastatic carcinoma.  Immunohistochemical stains performed by the referring institution and reviewed at [facility] for cytokeratin are  negative, confirming the diagnosis.  2. Right breast: Invasive ductal carcinoma with micropapillary features, per report 11.5 mm in greatest dimension.  Foci suspicious for lymphovascular invasion identified. Margins of resection are free of tumor.  Immunohistochemical stains performed by the referring institution and reviewed at  [facility] show that the tumor  cells are negative for p63 and show patchy positivity for CK 5/6.  Lung, right, needle core biopsy ([id number]; 02/14/2018): Squamous cell carcinoma.    Immunohistochemical stains performed by the referring institution show the tumor cells are strongly positive for p63  and CK 5/6, while negative for TTF-1 and CK7. These result support the diagnosis. Axilla, right, needle core biopsy  ([id number]; 03/11/2019): Lymph node positive for metastatic carcinoma, most consistent with breast primary  (see comment).  Immunohistochemical stains performed by the referring institution and reviewed at [facility] show that the tumor  cells are negative for p63, focally positive for CK 5/6, focally and weakly positive for JUAREZ-3, and strongly positive  for CK7. They are also negative for estrogen receptor, progesterone receptor, and HER2 JAM (score 0).  COMMENT:  Thank you for allowing me to review the case of this 73-year-old lady who recently underwent needle core biopsies  of a right axillary mass and who has a previous diagnosis of an invasive ductal carcinoma of the right breast, as well  as a squamous cell carcinoma of the lung. I am reviewing this case because of my special interest in breast  pathology.  I agree with your interpretation of this case. In my opinion, the lung tumor corresponds to a squamous cell  carcinoma and appears to be unrelated to the invasive ductal carcinoma of the breast. The axillary mass, in my  opinion, corresponds to metastases of the breast primary. Although it is a poorly differentiated carcinoma, the  immunophenotype is most consistent with the one that the breast primary exhibited, and is inconsistent with a  metastatic squamous cell carcinoma. I did share the case with Dr. Anabel Stone, one of our pulmonary pathologists,  and she concurs with this interpretation.  Note: Report attached.  Performing location:  [facility]  [address]  [place] [address]    CT neck/chest/abd/pelvis w/contrast 4/26/2019   The previously described right hilar mass is no longer visible.  There is persistent volume loss in the right middle lobe this appears similar to the prior PET-CT February 21, 2019.    Persistent abnormal right axillary node today measuring about 15 mm compared 18 mm prior.  The positioning of the adjacent nodes is slightly different likely accounting for the slight difference in measurement.    Cluster of tree-in-bud nodular densities left lower lobe series 2, image 93.  This may be related to small airways disease though serial follow-up suggested.      Assessment:       1.  History of Squamous cell carcinoma of right lung    2. History of breast cancer    3. Axillary lymphadenopathy    4. Recurrent breast cancer, unspecified laterality        Plan:   ECOG 0  1-4     Pt with hx of Stage 1A invasive ductal carcinoma rt breast s/p rt segmental mastectomy and SLN bx 7/11/2014 ER/MS neg HER 2 jose maria neg [id number](i-).  S/p adjuvant RT completed 9/2014 Pt declined adjuvant chemo  Follow-up Mammo 6/12/2017 No mammographic evidence of malignancy.  Pt  hospitalized 11/2017 with acute-on-chronic  resp failure  Abnormal CT imaging revealing Large right hilar mass with involvement of  adjacent segmental pulmonary arterial branches and bronchi with associated postobstructive atelectasis  and involvement of mediastinal and axillary adenopathy   S/p rt axillary LN bx ( outside facility) - metastatic poorly differentiated carcinoma of unknown primary  site  status post  lung biopsy 1/31/2017 -benign lung tissue  PET/CT 1/26/2018   Intense FDG uptake within the known right infrahilar lung mass, compatible with malignancy.   Intensely hypermetabolic right axillary, retropectoral, and lower right cervical lymph nodes, compatible with metastases.  Abnormal FDG uptake along right breast skin thickening, new from prior chest CTA and mammogram.    Repeat  lung bx 2/14/2018 revealed Squamous Cell CA lung  PD-L1 10% low expression  EGFR NEG  ALK NEG    Pt treated for advanced squamous cell CA of lung   She s/p  cycle 6 of carboplatin/Abraxane Day1 completed 7/2/2018 ( day 15 held due to prolonged cytopenias)  She completed therapy with near CR     PET/CT 2/21/2019 showed increased size and irregularity of the right axillary lymph node with increased FDG avidity    Recent MAMMO/US rt breast reveals suspicious findings rt axilla LN.  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 It was determined The axillary mass, in my opinion, corresponded  to metastases of the breast primary. Although it is a poorly differentiated carcinoma, theimmunophenotype is most consistent with the one that the breast primary exhibited, and is inconsistent with a metastatic squamous cell carcinoma.     CT imaging 4/26/2019 persistent rt axillary LAD, no other sites of disease     Pf followed by Rad/Onc regarding RT   It was determined to discuss potential surgical intervention involving alnd  Plan to discuss with breast surgeon , Dr. Alexandra potential surgical intervention     D/w Pathologist    Follow-up   1 mo with cbc,cmp prior to f/u       >45 minutes spent during this visit of which greater than 50% devoted to counseling and coordination of care regarding diagnosis and management plan.    Cc: Swetha Katz M.D.         MD Tory Roberson MD Raymond Gould, MD

## 2019-06-07 ENCOUNTER — INFUSION (OUTPATIENT)
Dept: INFUSION THERAPY | Facility: HOSPITAL | Age: 74
End: 2019-06-07
Attending: PHYSICIAN ASSISTANT
Payer: MEDICARE

## 2019-06-07 VITALS
RESPIRATION RATE: 18 BRPM | OXYGEN SATURATION: 95 % | SYSTOLIC BLOOD PRESSURE: 122 MMHG | TEMPERATURE: 98 F | HEART RATE: 53 BPM | DIASTOLIC BLOOD PRESSURE: 66 MMHG

## 2019-06-07 DIAGNOSIS — C34.90 SQUAMOUS CELL CARCINOMA LUNG: Primary | ICD-10-CM

## 2019-06-07 PROCEDURE — 63600175 PHARM REV CODE 636 W HCPCS: Mod: HCNC | Performed by: INTERNAL MEDICINE

## 2019-06-07 PROCEDURE — 25000003 PHARM REV CODE 250: Mod: HCNC | Performed by: INTERNAL MEDICINE

## 2019-06-07 PROCEDURE — 96523 IRRIG DRUG DELIVERY DEVICE: CPT | Mod: HCNC

## 2019-06-07 PROCEDURE — A4216 STERILE WATER/SALINE, 10 ML: HCPCS | Mod: HCNC | Performed by: INTERNAL MEDICINE

## 2019-06-07 RX ORDER — HEPARIN 100 UNIT/ML
500 SYRINGE INTRAVENOUS
Status: CANCELLED | OUTPATIENT
Start: 2019-10-04

## 2019-06-07 RX ORDER — HEPARIN 100 UNIT/ML
500 SYRINGE INTRAVENOUS
Status: DISCONTINUED | OUTPATIENT
Start: 2019-06-07 | End: 2019-06-07 | Stop reason: HOSPADM

## 2019-06-07 RX ORDER — SODIUM CHLORIDE 0.9 % (FLUSH) 0.9 %
10 SYRINGE (ML) INJECTION
Status: CANCELLED | OUTPATIENT
Start: 2019-06-07

## 2019-06-07 RX ORDER — SODIUM CHLORIDE 0.9 % (FLUSH) 0.9 %
10 SYRINGE (ML) INJECTION
Status: DISCONTINUED | OUTPATIENT
Start: 2019-06-07 | End: 2019-06-07 | Stop reason: HOSPADM

## 2019-06-07 RX ADMIN — HEPARIN SODIUM (PORCINE) LOCK FLUSH IV SOLN 100 UNIT/ML 500 UNITS: 100 SOLUTION at 08:06

## 2019-06-07 RX ADMIN — Medication 10 ML: at 08:06

## 2019-06-07 NOTE — PLAN OF CARE
Problem: Adult Inpatient Plan of Care  Goal: Plan of Care Review  Outcome: Ongoing (interventions implemented as appropriate)  Arrived to unit. No complaints voiced. Tolerated port flush. Pt has next appt. Pt ambulated off unit.

## 2019-06-11 ENCOUNTER — TELEPHONE (OUTPATIENT)
Dept: HEMATOLOGY/ONCOLOGY | Facility: CLINIC | Age: 74
End: 2019-06-11

## 2019-06-11 DIAGNOSIS — Z85.3 HISTORY OF BREAST CANCER: ICD-10-CM

## 2019-06-11 DIAGNOSIS — R94.2 ABNORMAL PET OF LEFT LUNG: ICD-10-CM

## 2019-06-11 DIAGNOSIS — C50.919 RECURRENT BREAST CANCER, UNSPECIFIED LATERALITY: Primary | ICD-10-CM

## 2019-06-11 DIAGNOSIS — C34.91 SQUAMOUS CELL CARCINOMA OF RIGHT LUNG: ICD-10-CM

## 2019-06-11 DIAGNOSIS — C34.81 MALIGNANT NEOPLASM OF OVERLAPPING SITES OF RIGHT BRONCHUS AND LUNG: ICD-10-CM

## 2019-06-11 DIAGNOSIS — C50.919 RECURRENT BREAST CANCER, UNSPECIFIED LATERALITY: ICD-10-CM

## 2019-06-11 DIAGNOSIS — R59.0 AXILLARY LYMPHADENOPATHY: Primary | ICD-10-CM

## 2019-06-11 NOTE — TELEPHONE ENCOUNTER
Called & spoke with pt, explained need for PET. Scheduled for next available on the [facility] per pt's preference. Will mail reminder letter as well & set up follow up after.

## 2019-06-11 NOTE — TELEPHONE ENCOUNTER
----- Message from Mildred Holliday MD sent at 6/11/2019  2:31 PM CDT -----  Done. Notify pt recommended by tumor board to repeat PET  ----- Message -----  From: Lilliana Ambrosio, RN  Sent: 6/11/2019  10:38 AM  To: Mildred Holliday MD    Can you enter PET order so I can schedule please?  ----- Message -----  From: Tory Christopher MD  Sent: 5/31/2019   9:33 AM  To: Mildred Holliday MD, Corinne E Coniglio, RN, #    Can we put her on for tumor board 6/11 please    ----- Message -----  From: Mildred Holliday MD  Sent: 5/31/2019   9:25 AM  To: Tory Christopher MD, Lilliana Ambrosio, RN    Hi,     Discussed w/plan with Ms Castellano regarding surgical intervention and she is agreeable. Pt informed your office would contact her to schedule f/u. Thanks, PAVEL

## 2019-06-20 ENCOUNTER — HOSPITAL ENCOUNTER (OUTPATIENT)
Dept: RADIOLOGY | Facility: HOSPITAL | Age: 74
Discharge: HOME | End: 2019-06-20
Attending: INTERNAL MEDICINE
Payer: MEDICARE

## 2019-06-20 DIAGNOSIS — R94.2 ABNORMAL PET OF LEFT LUNG: ICD-10-CM

## 2019-06-20 DIAGNOSIS — C34.81 MALIGNANT NEOPLASM OF OVERLAPPING SITES OF RIGHT BRONCHUS AND LUNG: ICD-10-CM

## 2019-06-20 DIAGNOSIS — C50.919 RECURRENT BREAST CANCER, UNSPECIFIED LATERALITY: ICD-10-CM

## 2019-06-20 PROCEDURE — 78815 PET IMAGE W/CT SKULL-THIGH: CPT | Mod: TC,HCNC,PS

## 2019-06-20 PROCEDURE — 78815 NM PET CT ROUTINE: ICD-10-PCS | Mod: 26,PS,HCNC, | Performed by: RADIOLOGY

## 2019-06-20 PROCEDURE — 78815 PET IMAGE W/CT SKULL-THIGH: CPT | Mod: 26,PS,HCNC, | Performed by: RADIOLOGY

## 2019-06-20 PROCEDURE — A9552 F18 FDG: HCPCS | Mod: HCNC

## 2019-06-25 ENCOUNTER — TELEPHONE (OUTPATIENT)
Dept: HEMATOLOGY/ONCOLOGY | Facility: CLINIC | Age: 74
End: 2019-06-25

## 2019-06-25 ENCOUNTER — TUMOR BOARD CONFERENCE (OUTPATIENT)
Dept: SURGERY | Facility: CLINIC | Age: 74
End: 2019-06-25

## 2019-06-25 DIAGNOSIS — C50.919 RECURRENT BREAST CANCER, UNSPECIFIED LATERALITY: ICD-10-CM

## 2019-06-25 DIAGNOSIS — R94.2 ABNORMAL PET OF LEFT LUNG: Primary | ICD-10-CM

## 2019-06-25 NOTE — TELEPHONE ENCOUNTER
No answer  LM on phone   Plan 2-D echo to eval cardiac fxn prior to anticipated chemotherapy     Follow-up July 1st following f/u with Dr. Alexandra

## 2019-06-27 ENCOUNTER — HOSPITAL ENCOUNTER (OUTPATIENT)
Dept: CARDIOLOGY | Facility: HOSPITAL | Age: 74
Discharge: HOME | End: 2019-06-27
Attending: INTERNAL MEDICINE
Payer: MEDICARE

## 2019-06-27 VITALS — WEIGHT: 183 LBS | HEART RATE: 47 BPM | HEIGHT: 62 IN | BODY MASS INDEX: 33.68 KG/M2

## 2019-06-27 DIAGNOSIS — R94.2 ABNORMAL PET OF LEFT LUNG: ICD-10-CM

## 2019-06-27 DIAGNOSIS — C50.919 RECURRENT BREAST CANCER, UNSPECIFIED LATERALITY: ICD-10-CM

## 2019-06-27 LAB
AORTIC ROOT ANNULUS: 2.36 CM
AORTIC VALVE CUSP SEPERATION: 1.4 CM
ASCENDING AORTA: 2.52 CM
AV INDEX (PROSTH): 0.55
AV MEAN GRADIENT: 8 MMHG
AV PEAK GRADIENT: 13 MMHG
AV VALVE AREA: 1.4 CM2
AV VELOCITY RATIO: 0.55
BSA FOR ECHO PROCEDURE: 1.91 M2
CV ECHO LV RWT: 0.48 CM
DOP CALC AO PEAK VEL: 1.77 M/S
DOP CALC AO VTI: 50.92 CM
DOP CALC LVOT AREA: 2.5 CM2
DOP CALC LVOT DIAMETER: 1.8 CM
DOP CALC LVOT PEAK VEL: 0.98 M/S
DOP CALC LVOT STROKE VOLUME: 71.22 CM3
DOP CALCLVOT PEAK VEL VTI: 28 CM
E WAVE DECELERATION TIME: 215.99 MSEC
E/A RATIO: 1.04
E/E' RATIO: 10.9 M/S
ECHO LV POSTERIOR WALL: 1.07 CM (ref 0.6–1.1)
FRACTIONAL SHORTENING: 28 % (ref 28–44)
INTERVENTRICULAR SEPTUM: 1.06 CM (ref 0.6–1.1)
IVRT: 0.14 MSEC
LA MAJOR: 4.93 CM
LA MINOR: 4.8 CM
LA WIDTH: 3.93 CM
LEFT ATRIUM SIZE: 3.51 CM
LEFT ATRIUM VOLUME INDEX: 31 ML/M2
LEFT ATRIUM VOLUME: 57.03 CM3
LEFT INTERNAL DIMENSION IN SYSTOLE: 3.2 CM (ref 2.1–4)
LEFT VENTRICLE DIASTOLIC VOLUME INDEX: 49.53 ML/M2
LEFT VENTRICLE DIASTOLIC VOLUME: 91.17 ML
LEFT VENTRICLE MASS INDEX: 90 G/M2
LEFT VENTRICLE SYSTOLIC VOLUME INDEX: 22.2 ML/M2
LEFT VENTRICLE SYSTOLIC VOLUME: 40.92 ML
LEFT VENTRICULAR INTERNAL DIMENSION IN DIASTOLE: 4.47 CM (ref 3.5–6)
LEFT VENTRICULAR MASS: 165.49 G
LV LATERAL E/E' RATIO: 10.9 M/S
LV SEPTAL E/E' RATIO: 10.9 M/S
MV PEAK A VEL: 1.05 M/S
MV PEAK E VEL: 1.09 M/S
PISA TR MAX VEL: 2.33 M/S
PULM VEIN S/D RATIO: 1.02
PV PEAK D VEL: 0.54 M/S
PV PEAK S VEL: 0.55 M/S
PV PEAK VELOCITY: 0.94 CM/S
RA MAJOR: 5.22 CM
RA PRESSURE: 3 MMHG
RA WIDTH: 3.65 CM
RIGHT VENTRICULAR END-DIASTOLIC DIMENSION: 3.53 CM
RV TISSUE DOPPLER FREE WALL SYSTOLIC VELOCITY 1 (APICAL 4 CHAMBER VIEW): 11.68 CM/S
SINUS: 2.79 CM
STJ: 2.3 CM
TDI LATERAL: 0.1 M/S
TDI SEPTAL: 0.1 M/S
TDI: 0.1 M/S
TR MAX PG: 22 MMHG
TRICUSPID ANNULAR PLANE SYSTOLIC EXCURSION: 2.07 CM
TV REST PULMONARY ARTERY PRESSURE: 25 MMHG

## 2019-06-27 PROCEDURE — 93306 TTE W/DOPPLER COMPLETE: CPT | Mod: 26,HCNC,, | Performed by: INTERNAL MEDICINE

## 2019-06-27 PROCEDURE — 93306 TTE W/DOPPLER COMPLETE: CPT | Mod: HCNC

## 2019-06-27 PROCEDURE — 93306 TRANSTHORACIC ECHO (TTE) COMPLETE (CUPID ONLY): ICD-10-PCS | Mod: 26,HCNC,, | Performed by: INTERNAL MEDICINE

## 2019-06-27 NOTE — PROGRESS NOTES
History of breast cancer    2014 Notable Event     PrevHx:She had an abnormal mammogram 6/4/2014 which  Revealed a round solid mass 6mm in rt breast.  She then underwent U/S guided core bx of rt breast mass on 6/17/2014.  Pathology revealed infiltrating ductal carcinoma Grade 3 with tumor  Present in thin-walled spaces suggestive of lymphatic spaces. Hormone  Receptor status on tumor specimen revealed ER negative 0% ,  [place] negative 0% and Her 2 jose maria negative.  She subsequently underwent rt segmental mastectomy and SLN bx  On 7/11/2014. Pathology of rt breast lumpectomy revealed invasive  Ductal carcinoma with micropapillary pattern ( Invasive micropapillary  Ca) with max tumor dimension 11.5mm with suggestion of tumor in   Thin walled spaces c/w lymphovascular involvement. Surgical  Margins free of tumor, grade 3, ER/[place] neg , Her 2 jose maria neg   With [place] Lymph node negative for Neoplasm.   Pathologic staging [id number](i-)  s/p adjuvant RT 9/2014 Pt declined Adjuvant chemo.    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         7/11/2014 Surgery           Procedure Laterality Anesthesia   LUMPECTOMY RIGHT BREAST S/P NEEDLE LOCALIZATION  Right General   BIOPSY-SENTINEL NODE (06873)                   3/12/2018 - 7/9/2018 Chemotherapy     Treatment Summary   Plan Name: OP BREAST PACLITAXEL CARBOPLATIN Q3W  Treatment Goal: Palliative  Status: Inactive  Start Date: [No treatment day found]  End Date: [No treatment day found]  Provider: Mildred Holliday MD  Chemotherapy: CARBOplatin (PARAPLATIN) in sodium chloride 0.9% 250 mL chemo infusion, , Intravenous, Clinic/HOD 1 time, 0 of 6 cycles    PACLitaxel (TAXOL) 175 mg/m2 = 330 mg in sodium chloride 0.9% 500 mL chemo infusion, 175 mg/m2, Intravenous, Clinic/HOD 1 time, 0 of 6 cycles    Plan Name: OP NSCLC abraxane (Weekly) + CARBOPLATIN  (AUC)  Treatment Goal: Palliative  Status: Active  Start Date: 3/12/2018  End Date: 7/9/2018 (Planned)  Provider: Mildred Holliday MD  Chemotherapy: PACLitaxel-protein bound (ABRAXANE) 100 mg/m2 = 190 mg in 38 mL infusion, 100 mg/m2 = 190 mg (100 % of original dose 100 mg/m2), Intravenous, Clinic/HOD 1 time, 6 of 6 cycles  Dose modification: 100 mg/m2 (original dose 100 mg/m2, Cycle 1), 170 mg (original dose 100 mg/m2, Cycle 6, Reason: MD Discretion), 180 mg (original dose 100 mg/m2, Cycle 6, Reason: MD Discretion)  Administration: 190 mg (3/12/2018), 180 mg (3/26/2018), 190 mg (4/2/2018), 180 mg (3/19/2018), 190 mg (4/9/2018), 190 mg (4/16/2018), 190 mg (4/23/2018), 190 mg (4/30/2018), 190 mg (5/7/2018), 190 mg (5/14/2018), 190 mg (5/21/2018), 190 mg (6/4/2018), 190 mg (5/28/2018), 190 mg (6/11/2018), 170 mg (6/25/2018), 190 mg (7/2/2018)    CARBOplatin (PARAPLATIN) 620 mg in sodium chloride 0.9% 250 mL chemo infusion, 620 mg (100 % of original dose 620 mg), Intravenous, Clinic/HOD 1 time, 6 of 6 cycles  Dose modification: 620 mg (original dose 620 mg, Cycle 1, Reason: MD Discretion),   (original dose 576 mg, Cycle 2, Reason: MD Discretion),   (original dose 629.4 mg, Cycle 3),   (original dose 629.4 mg, Cycle 4, Reason: MD Discretion),   (original dose 629.4 mg, Cycle 5), 570 mg (original dose 570 mg, Cycle 6, Reason: MD Discretion, Comment: cytopenia)  Administration: 620 mg (3/12/2018), 575 mg (4/2/2018), 630 mg (4/23/2018), 630 mg (5/14/2018), 630 mg (6/4/2018), 570 mg (6/25/2018)             2/21/2019 Imaging Significant Findings     PET scan  Impression       Increased size and irregularity of the right axillary lymph node with increased FDG avidity.  Although this would be atypical in location for lung carcinoma, the increased size and avidity must be concerning for metastatic disease in this patient with history of squamous cell lung cancer.  As a minimum, close correlation/follow-up recommended.               2/21/2019 Imaging Significant Findings     Mammogram  Impression:  Right  Lymph Node: Right axilla lymph node. Assessment: 0 - Incomplete. Ultrasound is recommended.      Left  There is no mammographic evidence of malignancy.          Recommendation:  Breast ultrasound is recommended.            2/26/2019 Imaging Significant Findings     Ultrasound  Impression:  Right  Lymph Node: Right axilla lymph node. Assessment: 4 - Suspicious finding. Biopsy is recommended.      BI-RADS Category:   Right: 4 - Suspicious  Overall: 4 - Suspicious     Recommendation:  Biopsy is recommended. Biopsy of the lymph node measuring 1.4 x 1.1 x 1.3 recommended , the one with the round shape configuration, corresponding to the most recent PET CT finding.   I discussed the findings with the patient. She will be scheduled for her biopsy.               3/11/2019 Biopsy     Impression: Ultrasound-guided biopsy of right breast mass was performed with placement of ribbon. Pathology pending.          3/11/2019 Initial Diagnosis     Invasive Ductal Carcinoma         3/11/2019 Tumor Markers     Estrogen Receptor: Negative  Progesterone Receptor: Negative  HER2: Negative           4/26/2019 Imaging Significant Findings     CT of chest and abdomen    Impression       The previously described right hilar mass is no longer visible.  There is persistent volume loss in the right middle lobe this appears similar to the prior PET-CT February 21, 2019.    Persistent abnormal right axillary node today measuring about 15 mm compared 18 mm prior.  The positioning of the adjacent nodes is slightly different likely accounting for the slight difference in measurement.    Cluster of tree-in-bud nodular densities left lower lobe series 2, image 93.  This may be related to small airways disease though serial follow-up suggested.                6/20/2019 Imaging Significant Findings     PET Scan  Impression       1.  New and worsening hypermetabolic lymph  nodes concerning for metastatic breast cancer as described above.  Tissue sampling would be required for definitive diagnosis.              6/25/2019 Tumor Conference       Favor breast primary for lymph node  Systemic therapy, given level 3+ supraclavicular nodes.

## 2019-06-30 NOTE — PROGRESS NOTES
Subjective:       Patient ID: Ade Castellano is a 73 y.o. female.    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   Pathology showed a 1.15 cm, grade 3 infiltrating ductal carcinoma.  One sentinel lymph node was negative for malignancy.  Margins were negative and the closest margin was 1 cm.  She was staged a little pT1c little pN0 cM0 stage IA.  She declined adjuvant chemo therapy.  She completed post operative radiation therapy at 400 cGy per fraction to 4000 cGy 9/2014         Pt hospitalized 11/2017   Ms. Castellano was admitted for acute on chronic respiratory failure requiring intubation and ventilation. Has large lung mass in right lung with probable post obstructive pneumonia resulting in COPD exacerbation. But also, patient had ran out of home O2 prior to onset of symptoms, likely contributing to presentation. Initiated on broad spectrum abx, IV steroids, duo-nebs and pulmonology consulted for ventilator co-management. Successfully extubated to BiPAP on 11/30. Then de-escalated to low flow nasal cannula. Abx tailored to augmentin for broad coverage, including anaerobic bacteria /     CTA chest 11/29/2017 revealed   Large right hilar mass with involvement of adjacent segmental pulmonary arterial branches and bronchi with associated postobstructive atelectasis and volume loss in the right middle lobe with adjacent ground glass opacity.  Findings highly concerning for underlying neoplastic process. Mediastinal and axillary adenopathy, with a right axillary lymph node measuring up to 2.0 cm in short axis diameter. No definite evidence of pulmonary thromboembolism.    She is followed by Pulmonology   She underwent rt  axillary LN bx at outside facility- on [facility]/16/[facility]  Pathology revealed metastatic poorly differentiated carcinoma of unknown primary site  TTF-1 negative, napsin negative  , cytokeratin 7 positive, cytokeratin 20 negative, p63 negative, cytokeratin 5/6 focal dim positivity    She next underwent lung bx at [facility] l1/31/2018   Pathology revealed benign lung tissue    She underwent Repeat Right Lung Bx 2/14/2018 Pathology reveals Squamous cell carcinoma  PD-L1 10% low expression  EGFR NEG  ALK NEG  BRAF NEG      Outside slides axillary LN specimen  requested for review/comparison to lung bx findings     1) Right Lung Bx 2/14/2018  Supplemental Diagnosis  Immunohistochemical stains show strong nuclear staining for p63 in essentially all tumor cells and very strong  cytoplasmic and membrane staining for CK5/6, also in essentially all tumor cells. TTF-1 and CK7 are negative  within tumor cells but do stain native pulmonary elements present within the biopsy. A stain for mucicarmine is  negative. Positive and negative controls function appropriately.  Final diagnosis: Specimen submitted as right lung biopsy  -Squamous cell carcinoma  (Electronically Signed: 2018-02-20 09:12:07 )  Diagnosed by: Phong Thompson  FINAL PATHOLOGIC DIAGNOSIS  Fragments of pulmonary parenchyma (submitted as right lung biopsy):    FINAL PATHOLOGIC DIAGNOSIS 1. Lymph node, right axilla, biopsy, review of 10 outside slides [facility], JS [id number] 1 [id number],  collected January 11, 2018: Metastatic poorly differentiated carcinoma (see comment).  The histologic section shows fibrous stroma infiltrated by nest of poorly differentiated malignant cells including  occasional dyskeratotic cells morphologically suggestive of metastatic squamous cell carcinoma.  Immunohistochemical stains are nonspecific; the cells are positive for cytokeratin 7 and cytokeratin 5/6 (focal) and  negative for cytokeratin 20, TTF-1, p63, GCDFP,  mammoglobin, and CEA      PET/CT  4/19/2018 revealed there has been a excellent response to therapy.  At least 90% reduction in malignant activity.    She s/p  cycle 6 of carboplatin/Abraxane completed 7/2018        PET/CT 2/21/2019 increased size and irregularity of the right axillary lymph node with increased FDG avidity     MAMMO/US rt breast 2/2019 revealed suspicious findings rt axilla LN.  Biopsy of the lymph node measuring 1.4 x 1.1 x 1.3 recommended    Pathology 3/11/2019   FINAL PATHOLOGIC DIAGNOSIS  Right axilla, mass, core biopsy:  Positive for poorly differentiated carcinoma.  he staining profile of the current axillary mass match more closely with the previous  breast carcinoma (due to the p63 negativity in both the 2014 breast cancer and the current axillary mass lesion but  p63 positivity in the lung mass from 2018).  This staining profile, together with the comparison with the patient's prior breast tumor and prior lung tumor supports  a diagnosis of poorly differentiated carcinoma and the malignancy appears morphologically similar to the prior  malignancies from both sites, but the staining profile in this small sample from the axillary mass more closely  correlates with the prior breast malignancy. Pathologic subclassification of this malignancy's primary location is not  definitive and clinical correlation is recommended definitively decide on possible primary location in this patient's  axillary mass sample.  Supplemental (2):  Additional immunohistochemical staining for progesterone receptor and HER2 are completed per clinician request  with following results:  Progesterone receptor: Negative; 0% nuclear tumor cell staining.  HER2: Negative; stain score = 0.  Supplemental (3):    Slides sent to [facility] for outside review  It was determined that agree with  interpretation of this case. In my opinion, the lung tumor corresponds to a squamous cell  carcinoma and appears to be unrelated to the  invasive ductal carcinoma of the breast. The axillary mass, in my  opinion, corresponds to metastases of the breast primary. Although it is a poorly differentiated carcinoma, the  immunophenotype is most consistent with the one that the breast primary exhibited, and is inconsistent with a  metastatic squamous cell carcinoma. I      Further testing sent for cancer type ID also sent and results have revealed molecular diagnosis testing revealed head and neck salivary gland carcinoma probability 84% breast adenocarcinoma could not be excluded with a 12% probability      Today, she has no new issues  Mild LOGAN  Mild fatige  She is nervous/anxious  She reports improved appetite and weight gain   She reports cough  She has diffuse arthralgias-stable  She reports minor swelling in rt arm   No hemoptysis   No bleeding-urinary/rectal/nasal          PrevHx:She had an abnormal mammogram 6/4/2014 which  Revealed a round solid mass 6mm in rt breast.  She then underwent U/S guided core bx of rt breast mass on 6/17/2014.  Pathology revealed infiltrating ductal carcinoma Grade 3 with tumor  Present in thin-walled spaces suggestive of lymphatic spaces. Hormone  Receptor status on tumor specimen revealed ER negative 0% ,  NJ negative 0% and Her 2 jose maria negative.  She subsequently underwent rt segmental mastectomy and SLN bx  On 7/11/2014. Pathology of rt breast lumpectomy revealed invasive  Ductal carcinoma with micropapillary pattern ( Invasive micropapillary  Ca) with max tumor dimension 11.5mm with suggestion of tumor in   Thin walled spaces c/w lymphovascular involvement. Surgical  Margins free of tumor, grade 3, ER/NJ neg , Her 2 jose maria neg   With [place] Lymph node negative for Neoplasm.   Pathologic staging [id number](i-)  Her mother was diagnosed with breast cancer in her 50's/  She has no family history of ovarian cancer.           Review of Systems   Constitutional: Positive for fatigue (mild). Negative for appetite change, fever  "and unexpected weight change.   HENT: Negative for mouth sores and rhinorrhea.    Eyes: Negative for visual disturbance.   Respiratory: Positive for cough and shortness of breath (mild).    Cardiovascular: Negative for chest pain.   Gastrointestinal: Negative for abdominal pain and diarrhea.   Genitourinary: Negative for frequency.   Musculoskeletal: Positive for arthralgias. Negative for back pain.   Skin: Negative for rash.   Neurological: Negative for dizziness and headaches.   Hematological: Negative for adenopathy.   Psychiatric/Behavioral: The patient is nervous/anxious.        Objective:        Vitals:    07/01/19 0852   BP: 125/75   BP Location: Left arm   Patient Position: Sitting   BP Method: Medium (Automatic)   Pulse: (!) 54   Temp: 97.5 °F (36.4 °C)   TempSrc: Oral   Weight: 85.6 kg (188 lb 11.4 oz)   Height: 5' 3" (1.6 m)         Physical Exam   Constitutional: She is oriented to person, place, and time. She appears well-developed and well-nourished.   HENT:   Head: Normocephalic.   Mouth/Throat: Oropharynx is clear and moist. No oropharyngeal exudate.   Eyes: Pupils are equal, round, and reactive to light. Conjunctivae and lids are normal. No scleral icterus.   Neck: Normal range of motion. Neck supple. No thyromegaly present.   Cardiovascular: Normal rate, regular rhythm and normal heart sounds.   No murmur heard.  Pulmonary/Chest: Effort normal and breath sounds normal. She has no wheezes. Right breast exhibits no inverted nipple, no mass, no nipple discharge and no skin change. Left breast exhibits no inverted nipple, no mass, no nipple discharge and no skin change.   Abdominal: Soft. Bowel sounds are normal. There is no tenderness. There is no rebound and no guarding.   Musculoskeletal: Normal range of motion. She exhibits no edema or tenderness.   Lymphadenopathy:     She has no cervical adenopathy.     She has axillary adenopathy.        Right: No supraclavicular adenopathy present.        Left: " No supraclavicular adenopathy present.   Neurological: She is alert and oriented to person, place, and time. No cranial nerve deficit. Coordination normal.   Skin: Skin is warm and dry. No ecchymosis, no petechiae and no rash noted. No erythema.   Psychiatric: She has a normal mood and affect.             CT a/p w/contrast 11/29/2018   1. No acute abnormality identified within the abdomen and pelvis.    2.  There are a few nonspecific prominent lymph nodes in the upper abdomen including a periesophageal lymph node which measures 1.0 cm in short axis diameter.    3.  Significant abdominal aortic atherosclerosis and abdominal aorta ectasia.    4.  Additional findings as above.    PET/CT 1/26/2018   1.  Intense FDG uptake within the known right infrahilar lung mass, compatible with malignancy.  It is unclear if this represents primary lung malignancy or metastatic breast cancer.    2.  Intensely hypermetabolic right axillary, retropectoral, and lower right cervical lymph nodes, compatible with metastases.    3.  Abnormal FDG uptake along right breast skin thickening, new from prior chest CTA and mammogram.  This may relate to localized edema/inflammation, though correlation with physical exam and mammography are recommended to exclude underlying inflammatory carcinoma.      MRI Brain w w/out contrast 2/9/2018  1.  No evidence of intracranial metastases.  2.  Sinus disease       Rt axillary LN bx at outside facility- on 1/16/2018 at [facility]  Pathology revealed metastatic poorly differentiated carcinoma of unknown primary  site 1/16/2018 at [facility]  TTF-1 negative, napsin negative  , cytokeratin 7 positive, cytokeratin 20 negative, p63 negative, cytokeratin 5/6 focal dim positivity    LUNG BIOPSY 1/31/2018  Pathology revealed benign lung tissue         SPECIMEN  1) Right Lung Bx 2/14/2018  Supplemental Diagnosis  Immunohistochemical stains show strong nuclear staining for p63 in  essentially all tumor cells and very strong  cytoplasmic and membrane staining for CK5/6, also in essentially all tumor cells. TTF-1 and CK7 are negative  within tumor cells but do stain native pulmonary elements present within the biopsy. A stain for mucicarmine is  negative. Positive and negative controls function appropriately.  Final diagnosis: Specimen submitted as right lung biopsy  -Squamous cell carcinoma  (Electronically Signed: 2018-02-20 09:12:07 )  Diagnosed by: Phong Thompson  FINAL PATHOLOGIC DIAGNOSIS  Fragments of pulmonary parenchyma (submitted as right lung biopsy):    FINAL PATHOLOGIC DIAGNOSIS 1. Lymph node, right axilla, biopsy, review of 10 outside slides [facility], JS 18 1 [phone],  collected January 11, 2018: Metastatic poorly differentiated carcinoma (see comment).  The histologic section shows fibrous stroma infiltrated by nest of poorly differentiated malignant cells including  occasional dyskeratotic cells morphologically suggestive of metastatic squamous cell carcinoma.  Immunohistochemical stains are nonspecific; the cells are positive for cytokeratin 7 and cytokeratin 5/6 (focal) and  negative for cytokeratin 20, TTF-1, p63, GCDFP, mammoglobin, and CEA        PET/CT 2/21/2019  Increased size and irregularity of the right axillary lymph node with increased FDG avidity.  Although this would be atypical in location for lung carcinoma, the increased size and avidity must be concerning for metastatic disease in this patient with history of squamous cell lung cancer.     US Rt breast and mammo 2/26/2019  Impression:  Right  Lymph Node: Right axilla lymph node. Assessment: 4 - Suspicious finding. Biopsy is recommended.      BI-RADS Category:   Right: 4 - Suspicious  Overall: 4 - Suspicious     Recommendation:  Biopsy is recommended. Biopsy of the lymph node measuring 1.4 x 1.1 x 1.3 recommended , the one with the round shape configuration, corresponding to the most recent PET  CT finding.         Pathology 3/11/2019   FINAL PATHOLOGIC DIAGNOSIS  Right axilla, mass, core biopsy:  Positive for poorly differentiated carcinoma.  See comment.  Comment:  The biopsy from the right axilla mass shows a poorly differentiated carcinoma in background lymphoid tissue  with enlarged cells showing increased nuclear size, prominent nucleoli, moderate amounts of cytoplasm and mitotic  figures. Immunostains are completed and reveal the tumor cells to stain positively with cytokeratin AE 1/AE3, CK  5/6 and CK 7. The tumor cells are negative for CK 20, Estrogen receptor, p63 and TTF-1. All stains have  satisfactory positive and negative controls. The patient's prior cases will be reviewed and an additional stain for  JUAREZ-3 is pending in an attempt to pinpoint the primary site of this malignancy and results will follow in a  supplemental report.      Supplemental Diagnosis  3/11/2019  The current axillary mass is compared to the patient's prior right lung biopsy (case number [id number]) and the  current axillary mass is morphologically similar to the lung malignancy. Also, the current axillary mass is compared  to the patient's prior breast resection (Case number [id number]) and appears similar as well. P63 is negative in the  [id number] tumor and CK 5/6 shows scattered positive staining in the [id number] tumor.  Immunostain for JUAREZ-3 is completed and shows only rare weak to moderate staining within the tumor cell nuclei  with satisfactory positive and negative controls. This stain is positive in the surrounding lymphocytes. This staining  pattern is non-specific and does not definitively differentiate between a lung and breast primary malignancy in this  right axilla mass biopsy. The staining profile of the current axillary mass match more closely with the previous  breast carcinoma (due to the p63 negativity in both the 2014 breast cancer and the current axillary mass lesion but  p63 positivity in the lung  mass from 2018).  This staining profile, together with the comparison with the patient's prior breast tumor and prior lung tumor supports  a diagnosis of poorly differentiated carcinoma and the malignancy appears morphologically similar to the prior  malignancies from both sites, but the staining profile in this small sample from the axillary mass more closely  correlates with the prior breast malignancy. Pathologic subclassification of this malignancy's primary location is not  definitive and clinical correlation is recommended definitively decide on possible primary location in this patient's  axillary mass sample.  Supplemental (2):  Additional immunohistochemical staining for progesterone receptor and HER2 are completed per clinician request  with following results:  Progesterone receptor: Negative; 0% nuclear tumor cell staining.  HER2: Negative; stain score = 0.  Supplemental (3):  [place] DIAGNOSIS:  FINAL DIAGNOSIS  Breast, right, needle core biopsy ([id number]; 06/17/2014): Invasive ductal carcinoma, [place] grade III (of  III), with focal micropapillary features.  Immunohistochemical stains performed at the referring institution show that the tumor cells have the following  phenotype: - Estrogen receptor: Negative (0% tumor cells staining). - Progesterone receptor: Negative (0% tumor  cells staining). - HER2: Negative (score 0).  Lymph nodes, right, sentinel biopsy and lumpectomy ([id number]; 07/11/2014):  1. Right sentinel lymph node: A single (1) lymph node is negative for metastatic carcinoma.  Immunohistochemical stains performed by the referring institution and reviewed at [facility] for cytokeratin are  negative, confirming the diagnosis.  2. Right breast: Invasive ductal carcinoma with micropapillary features, per report 11.5 mm in greatest dimension.  Foci suspicious for lymphovascular invasion identified. Margins of resection are free of tumor.  Immunohistochemical stains performed by the  referring institution and reviewed at [facility] show that the tumor  cells are negative for p63 and show patchy positivity for CK 5/6.  Lung, right, needle core biopsy ([id number]; 02/14/2018): Squamous cell carcinoma.    Immunohistochemical stains performed by the referring institution show the tumor cells are strongly positive for p63  and CK 5/6, while negative for TTF-1 and CK7. These result support the diagnosis. Axilla, right, needle core biopsy  ([id number]; 03/11/2019): Lymph node positive for metastatic carcinoma, most consistent with breast primary  (see comment).  Immunohistochemical stains performed by the referring institution and reviewed at [facility] show that the tumor  cells are negative for p63, focally positive for CK 5/6, focally and weakly positive for JUAREZ-3, and strongly positive  for CK7. They are also negative for estrogen receptor, progesterone receptor, and HER2 JAM (score 0).  COMMENT:  Thank you for allowing me to review the case of this 73-year-old lady who recently underwent needle core biopsies  of a right axillary mass and who has a previous diagnosis of an invasive ductal carcinoma of the right breast, as well  as a squamous cell carcinoma of the lung. I am reviewing this case because of my special interest in breast  pathology.  I agree with your interpretation of this case. In my opinion, the lung tumor corresponds to a squamous cell  carcinoma and appears to be unrelated to the invasive ductal carcinoma of the breast. The axillary mass, in my  opinion, corresponds to metastases of the breast primary. Although it is a poorly differentiated carcinoma, the  immunophenotype is most consistent with the one that the breast primary exhibited, and is inconsistent with a  metastatic squamous cell carcinoma. I did share the case with Dr. Anabel Stone, one of our pulmonary pathologists,  and she concurs with this interpretation.  Note: Report attached.  Performing location:  [facility]  Laboratories [facility]  [address] [place] [address]    CT neck/chest/abd/pelvis w/contrast 4/26/2019   The previously described right hilar mass is no longer visible.  There is persistent volume loss in the right middle lobe this appears similar to the prior PET-CT February 21, 2019.    Persistent abnormal right axillary node today measuring about 15 mm compared 18 mm prior.  The positioning of the adjacent nodes is slightly different likely accounting for the slight difference in measurement.    Cluster of tree-in-bud nodular densities left lower lobe series 2, image 93.  This may be related to small airways disease though serial follow-up suggested.      PET/CT 6/20/2019   New and worsening hypermetabolic lymph nodes concerning for metastatic breast cancer as described above.  Tissue sampling would be required for definitive diagnosis.      2-D echo 6/27/2019   · Normal left ventricular systolic function. The estimated ejection fraction is 55%  · Concentric left ventricular remodeling.  · Normal LV diastolic function.  · Normal right ventricular systolic function.  · Moderate left atrial enlargement.  · Mild right atrial enlargement.  · Mild aortic regurgitation.  · Mild mitral regurgitation.  · Mild tricuspid regurgitation.  · Normal central venous pressure (3 mm Hg).  · The estimated PA systolic pressure is 25 mm Hg    Assessment:       1. Metastatic breast cancer    2. History of lung cancer        Plan:   ECOG 0  1-4     Pt with hx of Stage 1A invasive ductal carcinoma rt breast s/p rt segmental mastectomy and SLN bx 7/11/2014 ER/CT neg HER 2 jose maria neg [id number](i-).  S/p adjuvant RT completed 9/2014 Pt declined adjuvant chemo  Follow-up Mammo 6/12/2017 No mammographic evidence of malignancy.  Pt  hospitalized 11/2017 with acute-on-chronic  resp failure  Abnormal CT imaging revealing Large right hilar mass with involvement of  adjacent segmental pulmonary arterial branches and bronchi with  associated postobstructive atelectasis  and involvement of mediastinal and axillary adenopathy   S/p rt axillary LN bx ( outside facility) - metastatic poorly differentiated carcinoma of unknown primary  site  status post  lung biopsy 1/31/2017 -benign lung tissue  PET/CT 1/26/2018   Intense FDG uptake within the known right infrahilar lung mass, compatible with malignancy.   Intensely hypermetabolic right axillary, retropectoral, and lower right cervical lymph nodes, compatible with metastases.  Abnormal FDG uptake along right breast skin thickening, new from prior chest CTA and mammogram.    Repeat lung bx 2/14/2018 revealed Squamous Cell CA lung  PD-L1 10% low expression  EGFR NEG  ALK NEG    Pt treated for advanced squamous cell CA of lung   She s/p  cycle 6 of carboplatin/Abraxane Day1 completed 7/2/2018 ( day 15 held due to prolonged cytopenias)  She completed therapy with near CR     PET/CT 2/21/2019 showed increased size and irregularity of the right axillary lymph node with increased FDG avidity    Recent MAMMO/US rt breast reveals suspicious findings rt axilla LN.  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 It was determined The axillary mass, in my opinion, corresponded  to metastases of the breast primary. Although it is a poorly differentiated carcinoma, theimmunophenotype is most consistent with the one that the breast primary exhibited, and is inconsistent with a metastatic squamous cell carcinoma.     CT imaging 4/26/2019 persistent rt axillary LAD, no other sites of disease     Pf followed by Rad/Onc regarding RT   It  was determined to discuss potential surgical intervention involving alnd  Plan to discuss with breast surgeon , Dr. Alexandra potential surgical intervention       Lymph node biopsy 3/11/2019 Right axilla, mass, core biopsy:  Positive for poorly differentiated carcinoma.   favor breast primary   Pt was discussed at multidisciplinary tumor board    PET/CT 6/20/2019  New and worsening hypermetabolic lymph nodes concerning for metastatic breast cancer     Discussed recent imaging findings in detail with patient which reveals new and worsening hypermetabolic melena metabolic lymph nodes including hypermetabolic supraclavicular node.  Therefore, at treatment option will be systemic chemotherapy in light of the recent imaging findings.  She will be followed by her surgical oncologist Dr. Christopher     Discussed potential chemotherapy options.  Patient has never undergone therapy with Adriamycin and Cytoxan.  Plan Adriamycin 60mg/m2 per meter squared and  Cytoxan 600mg per meters squared Q 21 days x4 cycles with repeat imaging.  She will also receive growth factor support    She was provided with a prescription for anti emetic therapy  She will undergo formal chemo teaching  She was counseled potential side effects  Discussed the risks of chemotherapy include but not limited to hair and skin changes, bone marrow damage (anemia, thrombocytopenia, immune suppression, neutropenia), allergic reactions, diarrhea, constipation, mouth sores, neuropathy, secondary cancers, damage at injection sites and nearly death.   Informed consent obtained  Chemo orders completed         BSA 1.95     D/w Pathologist  D/w Dr. Alexandra  Pt presented at multidisciplinary conference      Plan AC [id number] x 3 wks  ( pt has not received in past)   2-D echo reviewed and LVEF 55%   Plan PD-L1 testing   Cbc,cmp and  Mag prior to f/u   Chemo teaching   Follow-up with Dr. Clark 7/18/2019   Informed consent     Advance Care Planning     Power of   I  initiated the process of advance care planning today and explained the importance of this process to the patient.  I introduced the concept of advance directives to the patient, as well. Then the patient received detailed information about the importance of designating a Health Care Power of  (HCPOA). She was also instructed to communicate with this person about their wishes for future healthcare, should she become sick and lose decision-making capacity. The patient has previously appointed a HCPOA. After our discussion, the patient has decided to complete a HCPOA and has appointed her son and niece and  I spent a total time of 16 minutes discussing this issue with the patient.         >45 minutes spent during this visit of which greater than 50% devoted to counseling and coordination of care regarding diagnosis and management plan.    Cc: Swetha Katz M.D.         MD Tory Roberson MD Raymond Gould, MD

## 2019-07-01 ENCOUNTER — OFFICE VISIT (OUTPATIENT)
Dept: HEMATOLOGY/ONCOLOGY | Facility: CLINIC | Age: 74
End: 2019-07-01
Payer: MEDICARE

## 2019-07-01 ENCOUNTER — OFFICE VISIT (OUTPATIENT)
Dept: SURGERY | Facility: CLINIC | Age: 74
End: 2019-07-01
Payer: MEDICARE

## 2019-07-01 ENCOUNTER — LAB VISIT (OUTPATIENT)
Dept: LAB | Facility: HOSPITAL | Age: 74
End: 2019-07-01
Attending: INTERNAL MEDICINE
Payer: MEDICARE

## 2019-07-01 VITALS
WEIGHT: 188.69 LBS | TEMPERATURE: 98 F | HEIGHT: 63 IN | HEART RATE: 54 BPM | SYSTOLIC BLOOD PRESSURE: 125 MMHG | DIASTOLIC BLOOD PRESSURE: 75 MMHG | BODY MASS INDEX: 33.43 KG/M2

## 2019-07-01 VITALS — BODY MASS INDEX: 33.43 KG/M2 | WEIGHT: 188.69 LBS | HEIGHT: 63 IN

## 2019-07-01 DIAGNOSIS — C50.919 METASTATIC BREAST CANCER: Primary | ICD-10-CM

## 2019-07-01 DIAGNOSIS — C50.919 METASTATIC BREAST CANCER: ICD-10-CM

## 2019-07-01 DIAGNOSIS — G89.3 NEOPLASM RELATED PAIN: ICD-10-CM

## 2019-07-01 DIAGNOSIS — R59.0 AXILLARY ADENOPATHY: Primary | ICD-10-CM

## 2019-07-01 DIAGNOSIS — C34.90 MALIGNANT NEOPLASM OF LUNG, UNSPECIFIED LATERALITY, UNSPECIFIED PART OF LUNG: ICD-10-CM

## 2019-07-01 DIAGNOSIS — Z85.118 HISTORY OF LUNG CANCER: ICD-10-CM

## 2019-07-01 LAB
ALBUMIN SERPL BCP-MCNC: 4 G/DL (ref 3.5–5.2)
ALP SERPL-CCNC: 45 U/L (ref 55–135)
ALT SERPL W/O P-5'-P-CCNC: 24 U/L (ref 10–44)
ANION GAP SERPL CALC-SCNC: 7 MMOL/L (ref 8–16)
AST SERPL-CCNC: 23 U/L (ref 10–40)
BASOPHILS # BLD AUTO: 0.03 K/UL (ref 0–0.2)
BASOPHILS NFR BLD: 0.7 % (ref 0–1.9)
BILIRUB SERPL-MCNC: 0.4 MG/DL (ref 0.1–1)
BUN SERPL-MCNC: 14 MG/DL (ref 8–23)
CALCIUM SERPL-MCNC: 9.3 MG/DL (ref 8.7–10.5)
CHLORIDE SERPL-SCNC: 104 MMOL/L (ref 95–110)
CO2 SERPL-SCNC: 28 MMOL/L (ref 23–29)
CREAT SERPL-MCNC: 0.8 MG/DL (ref 0.5–1.4)
DIFFERENTIAL METHOD: NORMAL
EOSINOPHIL # BLD AUTO: 0.1 K/UL (ref 0–0.5)
EOSINOPHIL NFR BLD: 2.9 % (ref 0–8)
ERYTHROCYTE [DISTWIDTH] IN BLOOD BY AUTOMATED COUNT: 14.2 % (ref 11.5–14.5)
EST. GFR  (AFRICAN AMERICAN): >60 ML/MIN/1.73 M^2
EST. GFR  (NON AFRICAN AMERICAN): >60 ML/MIN/1.73 M^2
GLUCOSE SERPL-MCNC: 102 MG/DL (ref 70–110)
HCT VFR BLD AUTO: 40.9 % (ref 37–48.5)
HGB BLD-MCNC: 13.1 G/DL (ref 12–16)
LYMPHOCYTES # BLD AUTO: 1.3 K/UL (ref 1–4.8)
LYMPHOCYTES NFR BLD: 30.6 % (ref 18–48)
MAGNESIUM SERPL-MCNC: 1.9 MG/DL (ref 1.6–2.6)
MCH RBC QN AUTO: 30 PG (ref 27–31)
MCHC RBC AUTO-ENTMCNC: 32 G/DL (ref 32–36)
MCV RBC AUTO: 94 FL (ref 82–98)
MONOCYTES # BLD AUTO: 0.4 K/UL (ref 0.3–1)
MONOCYTES NFR BLD: 9 % (ref 4–15)
NEUTROPHILS # BLD AUTO: 2.3 K/UL (ref 1.8–7.7)
NEUTROPHILS NFR BLD: 57 % (ref 38–73)
PLATELET # BLD AUTO: 207 K/UL (ref 150–350)
PMV BLD AUTO: 9.5 FL (ref 9.2–12.9)
POTASSIUM SERPL-SCNC: 4.2 MMOL/L (ref 3.5–5.1)
PROT SERPL-MCNC: 6.8 G/DL (ref 6–8.4)
RBC # BLD AUTO: 4.36 M/UL (ref 4–5.4)
SODIUM SERPL-SCNC: 139 MMOL/L (ref 136–145)
WBC # BLD AUTO: 4.09 K/UL (ref 3.9–12.7)

## 2019-07-01 PROCEDURE — 99499 RISK ADDL DX/OHS AUDIT: ICD-10-PCS | Mod: HCNC,S$GLB,, | Performed by: INTERNAL MEDICINE

## 2019-07-01 PROCEDURE — 83735 ASSAY OF MAGNESIUM: CPT | Mod: HCNC

## 2019-07-01 PROCEDURE — 1101F PT FALLS ASSESS-DOCD LE1/YR: CPT | Mod: HCNC,CPTII,S$GLB, | Performed by: INTERNAL MEDICINE

## 2019-07-01 PROCEDURE — 99499 UNLISTED E&M SERVICE: CPT | Mod: HCNC,S$GLB,, | Performed by: INTERNAL MEDICINE

## 2019-07-01 PROCEDURE — 36415 COLL VENOUS BLD VENIPUNCTURE: CPT | Mod: HCNC

## 2019-07-01 PROCEDURE — 80053 COMPREHEN METABOLIC PANEL: CPT | Mod: HCNC

## 2019-07-01 PROCEDURE — 99214 PR OFFICE/OUTPT VISIT, EST, LEVL IV, 30-39 MIN: ICD-10-PCS | Mod: HCNC,S$GLB,, | Performed by: SURGERY

## 2019-07-01 PROCEDURE — 1101F PT FALLS ASSESS-DOCD LE1/YR: CPT | Mod: HCNC,CPTII,S$GLB, | Performed by: SURGERY

## 2019-07-01 PROCEDURE — 99999 PR PBB SHADOW E&M-EST. PATIENT-LVL III: CPT | Mod: PBBFAC,HCNC,, | Performed by: INTERNAL MEDICINE

## 2019-07-01 PROCEDURE — 99214 OFFICE O/P EST MOD 30 MIN: CPT | Mod: HCNC,S$GLB,, | Performed by: SURGERY

## 2019-07-01 PROCEDURE — 1101F PR PT FALLS ASSESS DOC 0-1 FALLS W/OUT INJ PAST YR: ICD-10-PCS | Mod: HCNC,CPTII,S$GLB, | Performed by: SURGERY

## 2019-07-01 PROCEDURE — 85025 COMPLETE CBC W/AUTO DIFF WBC: CPT | Mod: HCNC

## 2019-07-01 PROCEDURE — 99999 PR PBB SHADOW E&M-EST. PATIENT-LVL II: CPT | Mod: PBBFAC,HCNC,, | Performed by: SURGERY

## 2019-07-01 PROCEDURE — 99215 OFFICE O/P EST HI 40 MIN: CPT | Mod: HCNC,S$GLB,, | Performed by: INTERNAL MEDICINE

## 2019-07-01 PROCEDURE — 1101F PR PT FALLS ASSESS DOC 0-1 FALLS W/OUT INJ PAST YR: ICD-10-PCS | Mod: HCNC,CPTII,S$GLB, | Performed by: INTERNAL MEDICINE

## 2019-07-01 PROCEDURE — 99999 PR PBB SHADOW E&M-EST. PATIENT-LVL III: ICD-10-PCS | Mod: PBBFAC,HCNC,, | Performed by: INTERNAL MEDICINE

## 2019-07-01 PROCEDURE — 99999 PR PBB SHADOW E&M-EST. PATIENT-LVL II: ICD-10-PCS | Mod: PBBFAC,HCNC,, | Performed by: SURGERY

## 2019-07-01 PROCEDURE — 99215 PR OFFICE/OUTPT VISIT, EST, LEVL V, 40-54 MIN: ICD-10-PCS | Mod: HCNC,S$GLB,, | Performed by: INTERNAL MEDICINE

## 2019-07-01 RX ORDER — DOXORUBICIN HYDROCHLORIDE 2 MG/ML
105 INJECTION, SOLUTION INTRAVENOUS
Status: CANCELLED | OUTPATIENT
Start: 2019-07-05

## 2019-07-01 RX ORDER — ONDANSETRON HYDROCHLORIDE 8 MG/1
8 TABLET, FILM COATED ORAL EVERY 12 HOURS PRN
Qty: 30 TABLET | Refills: 2 | Status: SHIPPED | OUTPATIENT
Start: 2019-07-01 | End: 2019-07-25

## 2019-07-01 RX ORDER — OXYCODONE AND ACETAMINOPHEN 5; 325 MG/1; MG/1
1 TABLET ORAL EVERY 12 HOURS PRN
Qty: 30 TABLET | Refills: 0 | Status: SHIPPED | OUTPATIENT
Start: 2019-07-01 | End: 2019-07-25

## 2019-07-01 RX ORDER — HEPARIN 100 UNIT/ML
500 SYRINGE INTRAVENOUS
Status: CANCELLED | OUTPATIENT
Start: 2019-07-05

## 2019-07-01 RX ORDER — SODIUM CHLORIDE 0.9 % (FLUSH) 0.9 %
10 SYRINGE (ML) INJECTION
Status: CANCELLED | OUTPATIENT
Start: 2019-07-05

## 2019-07-01 NOTE — PLAN OF CARE
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

## 2019-07-01 NOTE — Clinical Note
Plan AC [id number] x 3 wksCbc,cmp and  Mag prior to f/u Chemo teaching Follow-up with Dr. Clark 7/18/2019 Informed consent

## 2019-07-01 NOTE — TELEPHONE ENCOUNTER
----- Message from Mildred Holliday MD sent at 7/1/2019  2:52 PM CDT -----  Contact: pt   Percocet 5/325mg 1 po bid prn pain Disp # 30  ----- Message -----  From: Lilliana Ambrosio, RN  Sent: 7/1/2019   2:46 PM  To: Mildred Holliday MD        ----- Message -----  From: Lilliana Muñoz, Patient Care Assistant  Sent: 7/1/2019   2:25 PM  To: Lilliana Ambrosio, RN    Pt stated Dr. Holliday was going to call her in something for pain.

## 2019-07-01 NOTE — Clinical Note
Plan to begin AC this weekHandouts and chemo teaching Cbc,cmp today Cbc,cmp, Mag  prior to f/u 3 wks

## 2019-07-05 ENCOUNTER — INFUSION (OUTPATIENT)
Dept: INFUSION THERAPY | Facility: HOSPITAL | Age: 74
End: 2019-07-05
Attending: PHYSICIAN ASSISTANT
Payer: MEDICARE

## 2019-07-05 VITALS
TEMPERATURE: 98 F | SYSTOLIC BLOOD PRESSURE: 127 MMHG | DIASTOLIC BLOOD PRESSURE: 71 MMHG | RESPIRATION RATE: 18 BRPM | HEART RATE: 51 BPM | OXYGEN SATURATION: 97 %

## 2019-07-05 DIAGNOSIS — C50.919 METASTATIC BREAST CANCER: Primary | ICD-10-CM

## 2019-07-05 PROCEDURE — 63600175 PHARM REV CODE 636 W HCPCS: Mod: HCNC | Performed by: INTERNAL MEDICINE

## 2019-07-05 PROCEDURE — A4216 STERILE WATER/SALINE, 10 ML: HCPCS | Mod: HCNC | Performed by: INTERNAL MEDICINE

## 2019-07-05 PROCEDURE — 96411 CHEMO IV PUSH ADDL DRUG: CPT | Mod: HCNC

## 2019-07-05 PROCEDURE — 25000003 PHARM REV CODE 250: Mod: HCNC | Performed by: INTERNAL MEDICINE

## 2019-07-05 PROCEDURE — 96367 TX/PROPH/DG ADDL SEQ IV INF: CPT | Mod: HCNC

## 2019-07-05 PROCEDURE — 96413 CHEMO IV INFUSION 1 HR: CPT | Mod: HCNC

## 2019-07-05 RX ORDER — SODIUM CHLORIDE 0.9 % (FLUSH) 0.9 %
10 SYRINGE (ML) INJECTION
Status: DISCONTINUED | OUTPATIENT
Start: 2019-07-05 | End: 2019-07-05 | Stop reason: HOSPADM

## 2019-07-05 RX ORDER — HEPARIN 100 UNIT/ML
500 SYRINGE INTRAVENOUS
Status: DISCONTINUED | OUTPATIENT
Start: 2019-07-05 | End: 2019-07-05 | Stop reason: HOSPADM

## 2019-07-05 RX ORDER — DOXORUBICIN HYDROCHLORIDE 2 MG/ML
105 INJECTION, SOLUTION INTRAVENOUS
Status: COMPLETED | OUTPATIENT
Start: 2019-07-05 | End: 2019-07-05

## 2019-07-05 RX ADMIN — Medication 10 ML: at 11:07

## 2019-07-05 RX ADMIN — APREPITANT 130 MG: 130 INJECTION, EMULSION INTRAVENOUS at 08:07

## 2019-07-05 RX ADMIN — HEPARIN 500 UNITS: 100 SYRINGE at 11:07

## 2019-07-05 RX ADMIN — CYCLOPHOSPHAMIDE 1055 MG: 1 INJECTION, POWDER, FOR SOLUTION INTRAVENOUS; ORAL at 10:07

## 2019-07-05 RX ADMIN — DOXORUBICIN HYDROCHLORIDE 106 MG: 2 INJECTION, SOLUTION INTRAVENOUS at 09:07

## 2019-07-05 RX ADMIN — PALONOSETRON HYDROCHLORIDE: 0.25 INJECTION, SOLUTION INTRAVENOUS at 08:07

## 2019-07-05 NOTE — PLAN OF CARE
Problem: Adult Inpatient Plan of Care  Goal: Plan of Care Review  Outcome: Ongoing (interventions implemented as appropriate)  Patient arrived to unit for Cycle 1 Adriamycin and Cytoxan. Reviewed indications and possible side effects of medications. Discussed symptoms management and when to contact MD or head to the emergency department. Patient verbalized understanding. Received A/C and tolerated well. VSS. No reactions noted during visit. Received discharge instructions and follow up appointments. Verbalized understanding and ambulated unassisted off unit by self.

## 2019-07-20 ENCOUNTER — HOSPITAL ENCOUNTER (INPATIENT)
Facility: HOSPITAL | Age: 74
LOS: 3 days | Discharge: HOME | DRG: 809 | End: 2019-07-23
Attending: EMERGENCY MEDICINE | Admitting: EMERGENCY MEDICINE
Payer: MEDICARE

## 2019-07-20 DIAGNOSIS — E86.0 DEHYDRATION: Primary | ICD-10-CM

## 2019-07-20 DIAGNOSIS — R53.83 FATIGUE: ICD-10-CM

## 2019-07-20 DIAGNOSIS — D70.9 NEUTROPENIC FEVER: ICD-10-CM

## 2019-07-20 DIAGNOSIS — R50.81 NEUTROPENIC FEVER: ICD-10-CM

## 2019-07-20 DIAGNOSIS — D70.3 NEUTROPENIA ASSOCIATED WITH INFECTION: ICD-10-CM

## 2019-07-20 PROBLEM — C50.919 METASTATIC BREAST CANCER: Chronic | Status: ACTIVE | Noted: 2019-07-01

## 2019-07-20 PROBLEM — Z66 DNR (DO NOT RESUSCITATE): Chronic | Status: ACTIVE | Noted: 2017-11-29

## 2019-07-20 PROBLEM — J44.0 CHRONIC OBSTRUCTIVE PULMONARY DISEASE WITH ACUTE LOWER RESPIRATORY INFECTION: Status: ACTIVE | Noted: 2019-07-20

## 2019-07-20 LAB
ALBUMIN SERPL BCP-MCNC: 4.2 G/DL (ref 3.5–5.2)
ALP SERPL-CCNC: 48 U/L (ref 55–135)
ALT SERPL W/O P-5'-P-CCNC: 36 U/L (ref 10–44)
ANION GAP SERPL CALC-SCNC: 12 MMOL/L (ref 8–16)
APTT BLDCRRT: <21 SEC (ref 21–32)
AST SERPL-CCNC: 37 U/L (ref 10–40)
BASOPHILS # BLD AUTO: 0.01 K/UL (ref 0–0.2)
BASOPHILS NFR BLD: 0.5 % (ref 0–1.9)
BILIRUB SERPL-MCNC: 0.5 MG/DL (ref 0.1–1)
BILIRUB UR QL STRIP: NEGATIVE
BNP SERPL-MCNC: 117 PG/ML (ref 0–99)
BUN SERPL-MCNC: 12 MG/DL (ref 8–23)
CALCIUM SERPL-MCNC: 9.6 MG/DL (ref 8.7–10.5)
CHLORIDE SERPL-SCNC: 99 MMOL/L (ref 95–110)
CLARITY UR: ABNORMAL
CO2 SERPL-SCNC: 26 MMOL/L (ref 23–29)
COLOR UR: YELLOW
CREAT SERPL-MCNC: 0.9 MG/DL (ref 0.5–1.4)
DIFFERENTIAL METHOD: ABNORMAL
EOSINOPHIL # BLD AUTO: 0 K/UL (ref 0–0.5)
EOSINOPHIL NFR BLD: 2 % (ref 0–8)
ERYTHROCYTE [DISTWIDTH] IN BLOOD BY AUTOMATED COUNT: 13.9 % (ref 11.5–14.5)
EST. GFR  (AFRICAN AMERICAN): >60 ML/MIN/1.73 M^2
EST. GFR  (NON AFRICAN AMERICAN): >60 ML/MIN/1.73 M^2
GLUCOSE SERPL-MCNC: 131 MG/DL (ref 70–110)
GLUCOSE UR QL STRIP: NEGATIVE
HCT VFR BLD AUTO: 37.9 % (ref 37–48.5)
HGB BLD-MCNC: 12.8 G/DL (ref 12–16)
HGB UR QL STRIP: ABNORMAL
INR PPP: 1 (ref 0.8–1.2)
KETONES UR QL STRIP: NEGATIVE
LACTATE SERPL-SCNC: 2.9 MMOL/L (ref 0.5–2.2)
LACTATE SERPL-SCNC: 3.5 MMOL/L (ref 0.5–2.2)
LEUKOCYTE ESTERASE UR QL STRIP: NEGATIVE
LYMPHOCYTES # BLD AUTO: 0.9 K/UL (ref 1–4.8)
LYMPHOCYTES NFR BLD: 45 % (ref 18–48)
MAGNESIUM SERPL-MCNC: 1.8 MG/DL (ref 1.6–2.6)
MCH RBC QN AUTO: 30.2 PG (ref 27–31)
MCHC RBC AUTO-ENTMCNC: 33.8 G/DL (ref 32–36)
MCV RBC AUTO: 89 FL (ref 82–98)
MICROSCOPIC COMMENT: NORMAL
MONOCYTES # BLD AUTO: 0.7 K/UL (ref 0.3–1)
MONOCYTES NFR BLD: 32.7 % (ref 4–15)
NEUTROPHILS # BLD AUTO: 0.4 K/UL (ref 1.8–7.7)
NEUTROPHILS NFR BLD: 19.8 % (ref 38–73)
NITRITE UR QL STRIP: NEGATIVE
NRBC BLD-RTO: 1 /100 WBC
PH UR STRIP: 5 [PH] (ref 5–8)
PLATELET # BLD AUTO: 268 K/UL (ref 150–350)
PLATELET BLD QL SMEAR: ABNORMAL
PMV BLD AUTO: 9.3 FL (ref 9.2–12.9)
POTASSIUM SERPL-SCNC: 4.2 MMOL/L (ref 3.5–5.1)
PROT SERPL-MCNC: 7.2 G/DL (ref 6–8.4)
PROT UR QL STRIP: NEGATIVE
PROTHROMBIN TIME: 10.5 SEC (ref 9–12.5)
RBC # BLD AUTO: 4.24 M/UL (ref 4–5.4)
RBC #/AREA URNS HPF: 0 /HPF (ref 0–4)
SODIUM SERPL-SCNC: 137 MMOL/L (ref 136–145)
SP GR UR STRIP: 1.01 (ref 1–1.03)
SQUAMOUS #/AREA URNS HPF: NORMAL /HPF
TROPONIN I SERPL DL<=0.01 NG/ML-MCNC: 0.01 NG/ML (ref 0–0.03)
URN SPEC COLLECT METH UR: ABNORMAL
UROBILINOGEN UR STRIP-ACNC: NEGATIVE EU/DL
WBC # BLD AUTO: 2.02 K/UL (ref 3.9–12.7)

## 2019-07-20 PROCEDURE — 87040 BLOOD CULTURE FOR BACTERIA: CPT | Mod: HCNC

## 2019-07-20 PROCEDURE — 96372 THER/PROPH/DIAG INJ SC/IM: CPT | Mod: 59 | Performed by: EMERGENCY MEDICINE

## 2019-07-20 PROCEDURE — 85610 PROTHROMBIN TIME: CPT | Mod: HCNC

## 2019-07-20 PROCEDURE — 25000242 PHARM REV CODE 250 ALT 637 W/ HCPCS: Mod: HCNC | Performed by: EMERGENCY MEDICINE

## 2019-07-20 PROCEDURE — 63600175 PHARM REV CODE 636 W HCPCS: Mod: HCNC | Performed by: HOSPITALIST

## 2019-07-20 PROCEDURE — 96361 HYDRATE IV INFUSION ADD-ON: CPT | Performed by: EMERGENCY MEDICINE

## 2019-07-20 PROCEDURE — 96375 TX/PRO/DX INJ NEW DRUG ADDON: CPT | Performed by: EMERGENCY MEDICINE

## 2019-07-20 PROCEDURE — 25000003 PHARM REV CODE 250: Mod: HCNC | Performed by: EMERGENCY MEDICINE

## 2019-07-20 PROCEDURE — 80053 COMPREHEN METABOLIC PANEL: CPT | Mod: HCNC

## 2019-07-20 PROCEDURE — 25000003 PHARM REV CODE 250: Mod: HCNC | Performed by: HOSPITALIST

## 2019-07-20 PROCEDURE — 93005 ELECTROCARDIOGRAM TRACING: CPT | Mod: HCNC

## 2019-07-20 PROCEDURE — 83605 ASSAY OF LACTIC ACID: CPT | Mod: 91,HCNC

## 2019-07-20 PROCEDURE — 11000001 HC ACUTE MED/SURG PRIVATE ROOM: Mod: HCNC

## 2019-07-20 PROCEDURE — 87086 URINE CULTURE/COLONY COUNT: CPT | Mod: HCNC

## 2019-07-20 PROCEDURE — 84484 ASSAY OF TROPONIN QUANT: CPT | Mod: HCNC

## 2019-07-20 PROCEDURE — 81000 URINALYSIS NONAUTO W/SCOPE: CPT | Mod: HCNC

## 2019-07-20 PROCEDURE — 99285 EMERGENCY DEPT VISIT HI MDM: CPT | Mod: 25,HCNC

## 2019-07-20 PROCEDURE — 93010 ELECTROCARDIOGRAM REPORT: CPT | Mod: HCNC,,, | Performed by: INTERNAL MEDICINE

## 2019-07-20 PROCEDURE — 96361 HYDRATE IV INFUSION ADD-ON: CPT | Mod: HCNC

## 2019-07-20 PROCEDURE — G0378 HOSPITAL OBSERVATION PER HR: HCPCS | Mod: HCNC

## 2019-07-20 PROCEDURE — 94640 AIRWAY INHALATION TREATMENT: CPT | Mod: HCNC

## 2019-07-20 PROCEDURE — 83735 ASSAY OF MAGNESIUM: CPT | Mod: HCNC

## 2019-07-20 PROCEDURE — 94644 CONT INHLJ TX 1ST HOUR: CPT | Mod: HCNC

## 2019-07-20 PROCEDURE — 27000221 HC OXYGEN, UP TO 24 HOURS: Mod: HCNC

## 2019-07-20 PROCEDURE — 87040 BLOOD CULTURE FOR BACTERIA: CPT | Mod: 59,HCNC

## 2019-07-20 PROCEDURE — 94761 N-INVAS EAR/PLS OXIMETRY MLT: CPT | Mod: HCNC

## 2019-07-20 PROCEDURE — 85730 THROMBOPLASTIN TIME PARTIAL: CPT | Mod: HCNC

## 2019-07-20 PROCEDURE — 83880 ASSAY OF NATRIURETIC PEPTIDE: CPT | Mod: HCNC

## 2019-07-20 PROCEDURE — 25000242 PHARM REV CODE 250 ALT 637 W/ HCPCS: Mod: HCNC | Performed by: HOSPITALIST

## 2019-07-20 PROCEDURE — 85025 COMPLETE CBC W/AUTO DIFF WBC: CPT | Mod: HCNC

## 2019-07-20 PROCEDURE — 96374 THER/PROPH/DIAG INJ IV PUSH: CPT | Performed by: EMERGENCY MEDICINE

## 2019-07-20 PROCEDURE — 93010 EKG 12-LEAD: ICD-10-PCS | Mod: HCNC,,, | Performed by: INTERNAL MEDICINE

## 2019-07-20 RX ORDER — SODIUM CHLORIDE 0.9 % (FLUSH) 0.9 %
10 SYRINGE (ML) INJECTION
Status: DISCONTINUED | OUTPATIENT
Start: 2019-07-20 | End: 2019-07-23 | Stop reason: HOSPADM

## 2019-07-20 RX ORDER — IPRATROPIUM BROMIDE AND ALBUTEROL SULFATE 2.5; .5 MG/3ML; MG/3ML
3 SOLUTION RESPIRATORY (INHALATION)
Status: DISCONTINUED | OUTPATIENT
Start: 2019-07-20 | End: 2019-07-23 | Stop reason: HOSPADM

## 2019-07-20 RX ORDER — MONTELUKAST SODIUM 10 MG/1
10 TABLET ORAL NIGHTLY
Status: DISCONTINUED | OUTPATIENT
Start: 2019-07-20 | End: 2019-07-23 | Stop reason: HOSPADM

## 2019-07-20 RX ORDER — ASPIRIN 81 MG/1
81 TABLET ORAL DAILY
Status: DISCONTINUED | OUTPATIENT
Start: 2019-07-21 | End: 2019-07-23 | Stop reason: HOSPADM

## 2019-07-20 RX ORDER — RAMELTEON 8 MG/1
8 TABLET ORAL NIGHTLY PRN
Status: DISCONTINUED | OUTPATIENT
Start: 2019-07-20 | End: 2019-07-23 | Stop reason: HOSPADM

## 2019-07-20 RX ORDER — SODIUM CHLORIDE 9 MG/ML
INJECTION, SOLUTION INTRAVENOUS CONTINUOUS
Status: ACTIVE | OUTPATIENT
Start: 2019-07-20 | End: 2019-07-21

## 2019-07-20 RX ORDER — ISOSORBIDE MONONITRATE 30 MG/1
60 TABLET, EXTENDED RELEASE ORAL DAILY
Status: DISCONTINUED | OUTPATIENT
Start: 2019-07-21 | End: 2019-07-21

## 2019-07-20 RX ORDER — ALBUTEROL SULFATE 2.5 MG/.5ML
10 SOLUTION RESPIRATORY (INHALATION)
Status: COMPLETED | OUTPATIENT
Start: 2019-07-20 | End: 2019-07-20

## 2019-07-20 RX ORDER — IPRATROPIUM BROMIDE AND ALBUTEROL SULFATE 2.5; .5 MG/3ML; MG/3ML
3 SOLUTION RESPIRATORY (INHALATION)
Status: COMPLETED | OUTPATIENT
Start: 2019-07-20 | End: 2019-07-20

## 2019-07-20 RX ORDER — ROSUVASTATIN CALCIUM 10 MG/1
20 TABLET, COATED ORAL DAILY
Status: DISCONTINUED | OUTPATIENT
Start: 2019-07-21 | End: 2019-07-23 | Stop reason: HOSPADM

## 2019-07-20 RX ORDER — ENOXAPARIN SODIUM 100 MG/ML
40 INJECTION SUBCUTANEOUS EVERY 24 HOURS
Status: DISCONTINUED | OUTPATIENT
Start: 2019-07-20 | End: 2019-07-23 | Stop reason: HOSPADM

## 2019-07-20 RX ORDER — BISACODYL 10 MG
10 SUPPOSITORY, RECTAL RECTAL DAILY PRN
Status: DISCONTINUED | OUTPATIENT
Start: 2019-07-20 | End: 2019-07-23 | Stop reason: HOSPADM

## 2019-07-20 RX ORDER — OXYCODONE AND ACETAMINOPHEN 5; 325 MG/1; MG/1
1 TABLET ORAL EVERY 12 HOURS PRN
Status: DISCONTINUED | OUTPATIENT
Start: 2019-07-20 | End: 2019-07-23 | Stop reason: HOSPADM

## 2019-07-20 RX ORDER — ONDANSETRON 2 MG/ML
4 INJECTION INTRAMUSCULAR; INTRAVENOUS EVERY 6 HOURS PRN
Status: DISCONTINUED | OUTPATIENT
Start: 2019-07-20 | End: 2019-07-23 | Stop reason: HOSPADM

## 2019-07-20 RX ORDER — POLYETHYLENE GLYCOL 3350 17 G/17G
17 POWDER, FOR SOLUTION ORAL 2 TIMES DAILY
Status: DISCONTINUED | OUTPATIENT
Start: 2019-07-20 | End: 2019-07-23 | Stop reason: HOSPADM

## 2019-07-20 RX ORDER — ALBUTEROL SULFATE 2.5 MG/.5ML
2.5 SOLUTION RESPIRATORY (INHALATION) EVERY 4 HOURS
Status: DISCONTINUED | OUTPATIENT
Start: 2019-07-20 | End: 2019-07-20

## 2019-07-20 RX ORDER — ACETAMINOPHEN 325 MG/1
650 TABLET ORAL EVERY 6 HOURS PRN
Status: DISCONTINUED | OUTPATIENT
Start: 2019-07-20 | End: 2019-07-23 | Stop reason: HOSPADM

## 2019-07-20 RX ORDER — PROCHLORPERAZINE EDISYLATE 5 MG/ML
10 INJECTION INTRAMUSCULAR; INTRAVENOUS EVERY 6 HOURS PRN
Status: DISCONTINUED | OUTPATIENT
Start: 2019-07-20 | End: 2019-07-23 | Stop reason: HOSPADM

## 2019-07-20 RX ORDER — IPRATROPIUM BROMIDE 0.5 MG/2.5ML
1 SOLUTION RESPIRATORY (INHALATION)
Status: COMPLETED | OUTPATIENT
Start: 2019-07-20 | End: 2019-07-20

## 2019-07-20 RX ORDER — METOPROLOL TARTRATE 25 MG/1
25 TABLET, FILM COATED ORAL 2 TIMES DAILY
Status: DISCONTINUED | OUTPATIENT
Start: 2019-07-20 | End: 2019-07-21

## 2019-07-20 RX ADMIN — RAMELTEON 8 MG: 8 TABLET, FILM COATED ORAL at 09:07

## 2019-07-20 RX ADMIN — IPRATROPIUM BROMIDE AND ALBUTEROL SULFATE 3 ML: .5; 3 SOLUTION RESPIRATORY (INHALATION) at 04:07

## 2019-07-20 RX ADMIN — ACETAMINOPHEN 650 MG: 325 TABLET, FILM COATED ORAL at 07:07

## 2019-07-20 RX ADMIN — ENOXAPARIN SODIUM 40 MG: 100 INJECTION SUBCUTANEOUS at 06:07

## 2019-07-20 RX ADMIN — SODIUM CHLORIDE: 0.9 INJECTION, SOLUTION INTRAVENOUS at 06:07

## 2019-07-20 RX ADMIN — SODIUM CHLORIDE 2000 ML: 0.9 INJECTION, SOLUTION INTRAVENOUS at 01:07

## 2019-07-20 RX ADMIN — IPRATROPIUM BROMIDE 1 MG: 0.5 SOLUTION RESPIRATORY (INHALATION) at 01:07

## 2019-07-20 RX ADMIN — METOPROLOL TARTRATE 25 MG: 25 TABLET ORAL at 09:07

## 2019-07-20 RX ADMIN — VANCOMYCIN HYDROCHLORIDE 2250 MG: 1 INJECTION, POWDER, LYOPHILIZED, FOR SOLUTION INTRAVENOUS at 09:07

## 2019-07-20 RX ADMIN — PIPERACILLIN, TAZOBACTAM 4.5 G: 4; .5 INJECTION, POWDER, LYOPHILIZED, FOR SOLUTION INTRAVENOUS at 10:07

## 2019-07-20 RX ADMIN — POLYETHYLENE GLYCOL 3350 17 G: 17 POWDER, FOR SOLUTION ORAL at 09:07

## 2019-07-20 RX ADMIN — MONTELUKAST 10 MG: 10 TABLET, FILM COATED ORAL at 09:07

## 2019-07-20 RX ADMIN — ALBUTEROL SULFATE 10 MG: 2.5 SOLUTION RESPIRATORY (INHALATION) at 01:07

## 2019-07-20 RX ADMIN — IPRATROPIUM BROMIDE AND ALBUTEROL SULFATE 3 ML: .5; 3 SOLUTION RESPIRATORY (INHALATION) at 08:07

## 2019-07-20 NOTE — SUBJECTIVE & OBJECTIVE
Past Medical History:   Diagnosis Date    Acute respiratory failure with hypoxia and hypercapnia 11/29/2017    Angina pectoris     Arthritis     Bell's palsy     left facial weakness    Breast cancer     RIGHT    CAD (coronary artery disease)     Cervical cancer     Chronic bronchitis     COPD (chronic obstructive pulmonary disease)     Dr. Katz    Dental bridge present     Emphysema of lung     H/O colonoscopy 06/2017    due for repeat colonsocopy in 6/2018    History of heart artery stent     Dr. Ortiz  x2 stents    Hyperlipidemia     Hypertension     Lung cancer     Myocardial infarction     SUNITHA (obstructive sleep apnea)     intolerant to mask    Pneumonia     Pneumonia due to other staphylococcus     PUD (peptic ulcer disease)     Severe anemia 6/11/2018    Sleep apnea     Vaginal delivery     x1       Past Surgical History:   Procedure Laterality Date    ADENOIDECTOMY      [id number] N/A 2/14/2018    Performed by [facility]    BIOPSY-SENTINEL NODE (69103) Right 7/11/2014    Per[facility]berry [facility] at [facility]    BIOPSY-ULTRASOUND GUIDED CORE Right 6/17/2014    [facility] by [facility]    BREAST BIOPSY Right     x3    BREAST LUMPECTOMY      BREAST SURGERY      lumpectomy right side     CERVIX SURGERY      cone    COLONOSCOPY N/A 1/29/2019    Performed by [facility] Perez [facility] at [facility] ENDO    COLONOSCOPY N/A 11/28/2018    Performed [facility]ura Urbina [facility] at [facility] END[facility]    COLONOSCOPY N/A 6/30/2017    Performed by Julio Rudd [facility] at [facility] END[facility]    COLONOSCOPY N/A 3/17/2017    [facility]formed by [facility] at [facility] END[facility]    ESOPHAGOGASTRODUODENOSCOPY (EGD) N/A 10/11/2017    Per[facility] by [facility]udd [facility] at [facility] END[facility]    EYE SURGERY Bilateral 06/08/2018    cataract     [id number] N/A 3/7/2018    [facility]formed [facility]    LUMPECTOMY RIGHT BREAST S/P NEEDLE LOCALIZATION  Right 7/11/2014     [facility] OR    PORTACAT PLACEMENT Right 01/2018    sweat glands axillary regions Bilateral     TONSILLECTOMY         Review of patient's allergies indicates:  No Known Allergies    No current facility-administered medications on file prior to encounter.      Current Outpatient Medications on File Prior to Encounter   Medication Sig    albuterol (PROVENTIL) 2.5 mg /3 mL (0.083 %) nebulizer solution NEBULIZE 1 vial EVERY 6 HOURS AS NEEDED FOR WHEEZING    albuterol (VENTOLIN HFA) 90 mcg/actuation inhaler INHALE 2 PUFF(S) BY MOUTH EVERY 4 - 6 HOURS AS NEEDED FOR SHORTNESS OF BREATH or WHEEZING    aspirin (ECOTRIN) 325 MG EC tablet Take 325 mg by mouth once daily.    desloratadine (CLARINEX) 5 mg tablet Take 1 tablet (5 mg total) by mouth once daily.    fluticasone (FLONASE) 50 mcg/actuation nasal spray USE TWO SPRAYS IN EACH NOSTRIL ONCE A DAY    isosorbide mononitrate (IMDUR) 60 MG 24 hr tablet Take 1 tablet (60 mg total) by mouth once daily.    metoprolol tartrate (LOPRESSOR) 25 MG tablet Take 1 tablet (25 mg total) by mouth 2 (two) times daily.    montelukast (SINGULAIR) 10 mg tablet Take 10 mg by mouth nightly. at bedtime.    NITROSTAT 0.4 mg SL tablet place 1 tablet under the tongue AS NEEDED no more than 3 in 15 minutes    ondansetron (ZOFRAN) 8 MG tablet Take 1 tablet (8 mg total) by mouth every 12 (twelve) hours as needed for Nausea.    oxyCODONE-acetaminophen (PERCOCET) 5-325 mg per tablet Take 1 tablet by mouth every 12 (twelve) hours as needed for Pain.    rosuvastatin (CRESTOR) 20 MG tablet take 1/2 TABLET(S) BY MOUTH ONCE A DAY    TRELEGY ELLIPTA 100-62.5-25 mcg DsDv INHALE ONE PUFF INTO THE LUNGS ONCE A DAY     Family History     Problem Relation (Age of Onset)    Breast cancer Mother    Cancer Mother, Father, Sister, Maternal Grandmother, Maternal Grandfather, Paternal Grandmother, Paternal Grandfather, Sister    Heart attack Sister    Heart disease Mother,  Maternal Grandmother, Maternal Grandfather    No Known Problems Sister        Tobacco Use    Smoking status: Former Smoker     Packs/day: 0.25     Years: 50.00     Pack years: 12.50     Types: Cigarettes     Last attempt to quit: 2017     Years since quittin.6    Smokeless tobacco: Never Used   Substance and Sexual Activity    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 Negative for chills.   Eyes: Negative for photophobia and visual disturbance.   Respiratory: Positive for cough and shortness of breath.    Cardiovascular: Negative for chest pain, palpitations and leg swelling.   Gastrointestinal: Negative for abdominal pain, diarrhea, nausea and vomiting.   Genitourinary: Negative for dysuria, frequency and urgency.   Skin: Negative for pallor, rash and wound.   Neurological: Negative for light-headedness and headaches.   Psychiatric/Behavioral: Negative for confusion and decreased concentration.     Objective:     Vital Signs (Most Recent):  Temp: 98.6 °F (37 °C) (19 175)  Pulse: 80 (19 175)  Resp: 19 (19)  BP: (!) 120/58 (19)  SpO2: 100 % (19) Vital Signs (24h Range):  Temp:  [98.6 °F (37 °C)-99.1 °F (37.3 °C)] 98.6 °F (37 °C)  Pulse:  [78-95] 80  Resp:  [18-19] 19  SpO2:  [95 %-100 %] 100 %  BP: (115-126)/(53-81) 120/58     Weight: 83.5 kg (184 lb)  Body mass index is 33.65 kg/m².    Physical Exam   Constitutional: She is oriented to person, place, and time. She appears well-developed and well-nourished. She appears ill. No distress.   HENT:   Head: Normocephalic and atraumatic.   Right Ear: External ear normal.   Left Ear: External ear normal.   Nose: Nose normal.   Mouth/Throat: Oropharynx is clear and moist.   Eyes: Pupils are equal, round, and reactive to light. Conjunctivae and EOM are normal.   Neck: Normal range of motion. Neck  supple.   Cardiovascular: Normal rate, regular rhythm and intact distal pulses.   Pulmonary/Chest: No respiratory distress. She has wheezes. She has rhonchi.   Abdominal: Soft. Bowel sounds are normal. She exhibits no distension. There is no tenderness.   No palpable hepatomegaly or splenomegaly    Musculoskeletal: Normal range of motion. She exhibits no edema or tenderness.   Neurological: She is alert and oriented to person, place, and time.   Skin: Skin is warm and dry.   Psychiatric: She has a normal mood and affect. Thought content normal.   Nursing note and vitals reviewed.        CRANIAL NERVES     CN III, IV, VI   Pupils are equal, round, and reactive to light.  Extraocular motions are normal.        Significant Labs: All pertinent labs within the past 24 hours have been reviewed.    Significant Imaging: I have reviewed all pertinent imaging results/findings within the past 24 hours.

## 2019-07-20 NOTE — HPI
73 y.o. female with COPD, CAD, hypertension, hyperlipidemia, SUNITHA, arthritis, peptic ulcer disease, squamous cell carcinoma of the lung, and ductal carcinoma of the breast presents with a complaint of worsening fatigue over the past 4 days.  Last chemo was 7/5/2019.  She has been overdoing it little performing yd work and he and not drinking much fluids.  She reports her shortness of breath and cough is worse than baseline.  She has home oxygen, but does not routinely wear it.  Denies fever, chills, chest pain, palpitations, orthopnea, PND, dizziness, syncope, nausea, vomiting, diarrhea, abdominal pain, bloody or black stools, dysuria, frequency, or urgency.  In the ED she was given IV fluids and nebulizer treatment with improvement.  Routine labs, chest x-ray, EKG, CT head, and urinalysis were unremarkable for any acute abnormality other than an elevated lactic acid of 2.9.  This was repeated after IV fluid administration and resulted as 3.5.  placed in observation to trend her lactic acid.  She was afebrile while in the ED, but upon arrival to the floor she spiked a temperature of 101° F. repeat blood cultures and broad-spectrum antibiotics initiated.

## 2019-07-20 NOTE — ED PROVIDER NOTES
Encounter Date: 7/20/2019    SCRIBE #1 NOTE: I, Nadja Bashir, am scribing for, and in the presence of,  Homar Orellana MD. I have scribed the entire note.       History     Chief Complaint   Patient presents with    Fatigue     Chemo 8 days ago. Pt reports weakness x 4 days. Worsening today. +moist productive cough and wheezing per EMS. Duo Neb given in route. No distress. RR unlabored. Denies dizziness, SOB and CP at present.      CC: Fatigue    HPI: This is a 73 y.o.female patient, with a PMHx of COPD, CAD, HTN, HLD, MI, pneumonia, and lung cancer, presenting to the ED, via EMS, with a complaint of fatigue and generalized weakness, that began x4 days ago and worsened today. 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 Per EMS, patient received a Duo Nebulizer. Per EMS, patient had a productive, wet cough. Patient states she is short winded but that is baseline for her. She reports urinary frequency and constipation. Last bowel movement x3 days ago using a laxative. Patient reports she has oxygen at home, but does not use it. Relative reports patient fell out of her bed last night and recently cut the grass.  Has not been having as much fluids.  Patient denies any fever, chills, chest pain, neck pain, back pain, abdominal pain, headaches, sore throat, nausea, vomiting, diarrhea, dysuria, or any other associated symptoms. No prior Tx. No alleviating or aggravating factors. No known drug allergies. No alcohol or illicit drug use.    The history is provided by the patient and a relative. No  was used.     Review of patient's allergies indicates:  No Known Allergies  Past Medical History:   Diagnosis Date    Acute respiratory failure with hypoxia and hypercapnia 11/29/2017    Angina pectoris     Arthritis     Bell's palsy     left facial weakness    Breast cancer     RIGHT    CAD (coronary artery disease)     Cervical cancer     Chronic bronchitis      COPD (chronic obstructive pulmonary disease)     Dr. Katz    Dental bridge present     Emphysema of lung     H/O colonoscopy 06/2017    due for repeat colonsocopy in 6/2018    History of heart artery stent     Dr. Ortiz  x2 stents    Hyperlipidemia     Hypertension     Lung cancer     Myocardial infarction     SUNITHA (obstructive sleep apnea)     intolerant to mask    Pneumonia     Pneumonia due to other staphylococcus     PUD (peptic ulcer disease)     Severe anemia 6/11/2018    Sleep apnea     Vaginal delivery     x1     Past Surgical History:   Procedure Laterality Date    ADENOIDECTOMY      [id number] N/A 2/14/2018    Performed by [facility] OR    BIOPSY-SENTINEL NODE (24340) Right 7/11/2014    Performed by [facility]berry [facility] at [facility] OR    BIOPSY-ULTRASOUND GUIDED CORE Right 6/17/2014    [facility] by [facility] OR    BREAST BIOPSY Right     x3    BREAST LUMPECTOMY      BREAST SURGERY      lumpectomy right side     CERVIX SURGERY      cone    COLONOSCOPY N/A 1/29/2019    Performed by Emmanuel Perez [facility] at [facility] ENDO    COLONOSCOPY N/A 11/28/2018    Performed by Nura Urbina [facility] at [facility] ENDO    COLONOSCOPY N/A 6/30/2017    Performed by [facility] Rudd [facility] at [facility] ENDO    COLONOSCOPY N/A 3/17/2017    [facility] at [facility] ENDO    ESOPHAGOGASTRODUODENOSCOPY (EGD) N/A 10/11/2017    Performed by [facility] at [facility] ENDO    EYE SURGERY Bilateral 06/08/2018    cataract     RLLSPGYWN-OPEZ-H-CATH N/A 3/7/2018    [facility] OR    LUMPECTOMY RIGHT BREAST S/P NEEDLE LOCALIZATION  Right 7/11/2014    [facility] at [facility] OR    PORTACATH PLACEMENT Right 01/2018    sweat glands axillary regions Bilateral     TONSILLECTOMY       Family History   Problem Relation Age of Onset    Cancer Mother         breast    Heart disease Mother     Breast cancer Mother     Cancer Father          lung-smoker     Cancer Sister         lung-smoker     Heart attack Sister     Cancer Maternal Grandmother     Heart disease Maternal Grandmother     Cancer Maternal Grandfather     Heart disease Maternal Grandfather     Cancer Paternal Grandmother     Cancer Paternal Grandfather     Cancer Sister         mets not sure where it started     No Known Problems Sister      Social History     Tobacco Use    Smoking status: Former Smoker     Packs/day: 0.25     Years: 50.00     Pack years: 12.50     Types: Cigarettes     Last attempt to quit: 2017     Years since quittin.6    Smokeless tobacco: Never Used   Substance Use Topics    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 Negative for chills and fever.   HENT: Negative for congestion, ear pain, rhinorrhea and sore throat.    Eyes: Negative for pain and visual disturbance.   Respiratory: Positive for cough and shortness of breath.    Cardiovascular: Negative for chest pain.   Gastrointestinal: Positive for constipation. Negative for abdominal pain, diarrhea, nausea and vomiting.   Genitourinary: Positive for frequency. Negative for dysuria.   Musculoskeletal: Negative for back pain and neck pain.   Skin: Negative for rash.   Neurological: Negative for headaches.       Physical Exam     Initial Vitals [19 1233]   BP Pulse Resp Temp SpO2   115/80 91 18 99.1 °F (37.3 °C) 95 %      MAP       --         Physical Exam    Constitutional: She appears well-developed and well-nourished. No distress.   HENT:   Head: Normocephalic and atraumatic. Head is without raccoon's eyes and without Yan's sign.   Nose: Nose normal.   Eyes: EOM are normal. Pupils are equal, round, and reactive to light.   Neck: Normal range of motion. Neck supple.   Cardiovascular: Normal rate, regular rhythm and normal heart sounds. Exam reveals no gallop and no friction  rub.    No murmur heard.  Pulmonary/Chest: No respiratory distress. She has wheezes. She has no rhonchi. She has no rales.   Diminished breath sounds. Bilateral wheezing.    Abdominal: Soft. Bowel sounds are normal. There is no tenderness. There is no rebound and no guarding.   Musculoskeletal: Normal range of motion.   No major edema of lower extremities.   Neurological: She is alert and oriented to person, place, and time. She has normal strength. No cranial nerve deficit or sensory deficit.   Skin: Skin is warm and dry. Capillary refill takes less than 2 seconds.   Psychiatric: She has a normal mood and affect.         ED Course   Procedures  Labs Reviewed   CBC W/ AUTO DIFFERENTIAL - Abnormal; Notable for the following components:       Result Value    WBC 2.02 (*)     Gran # (ANC) 0.4 (*)     Lymph # 0.9 (*)     nRBC 1 (*)     Gran% 19.8 (*)     Mono% 32.7 (*)     All other components within normal limits   COMPREHENSIVE METABOLIC PANEL - Abnormal; Notable for the following components:    Glucose 131 (*)     Alkaline Phosphatase 48 (*)     All other components within normal limits   B-TYPE NATRIURETIC PEPTIDE - Abnormal; Notable for the following components:     (*)     All other components within normal limits   URINALYSIS, REFLEX TO URINE CULTURE - Abnormal; Notable for the following components:    Appearance, UA Cloudy (*)     Occult Blood UA 1+ (*)     All other components within normal limits    Narrative:     Preferred Collection Type->Urine, Clean Catch   LACTIC ACID, PLASMA - Abnormal; Notable for the following components:    Lactate (Lactic Acid) 2.9 (*)     All other components within normal limits   LACTIC ACID, PLASMA - Abnormal; Notable for the following components:    Lactate (Lactic Acid) 3.5 (*)     All other components within normal limits    Narrative:      LA  critical result(s) called and verbal readback obtained from Jl Almaraz @1600, 07/20/2019 15:59   CULTURE, BLOOD   CULTURE,  BLOOD   MAGNESIUM   PROTIME-INR   APTT   TROPONIN I   URINALYSIS MICROSCOPIC    Narrative:     Preferred Collection Type->Urine, Clean Catch     EKG Readings: (Independently Interpreted)   EKG done at 1:01 p.m. showing normal sinus rhythm rate 86.  No ST elevation or major T-wave abnormalities.  Normal axis and QRS.  Compared to previous in similar other than no longer bradycardic.  Nonspecific EKG.     ECG Results          EKG 12-lead (In process)  Result time 07/20/19 13:52:17    In process by Interface, Lab In [facility] (07/20/19 13:52:17)                 Narrative:    Test Reason : R53.83,    Vent. Rate : 086 BPM     Atrial Rate : 086 BPM     P-R Int : 152 ms          QRS Dur : 078 ms      QT Int : 386 ms       P-R-T Axes : 073 069 045 degrees     QTc Int : 461 ms    Normal sinus rhythm  Nonspecific ST abnormality  Abnormal ECG  When compared with ECG of 24-APR-2019 07:24,  Significant changes have occurred    Referred By: [email or web address]   SELF           Confirmed By:                   In process by Interface, Lab In [facility] (07/20/19 13:40:51)                 Narrative:    Test Reason : R53.83,    Vent. Rate : 086 BPM     Atrial Rate : 086 BPM     P-R Int : 152 ms          QRS Dur : 078 ms      QT Int : 386 ms       P-R-T Axes : 073 069 045 degrees     QTc Int : 461 ms    Normal sinus rhythm  Nonspecific ST abnormality  Abnormal ECG  When compared with ECG of 24-APR-2019 07:24,  Significant changes have occurred    Referred By: [email or web address]   SELF           Confirmed By:                             Imaging Results          CT Head Without Contrast (Final result)  Result time 07/20/19 13:30:43    Final result by Carlos Guzman MD (07/20/19 13:30:43)                 Impression:      1. No acute major vascular distribution infarct or intracranial hemorrhage.  2. Mild generalized cerebral volume loss.      Electronically signed by: Carlos Guzman MD  Date:    07/20/2019  Time:    13:30             Narrative:     EXAMINATION:  CT HEAD WITHOUT CONTRAST    CLINICAL HISTORY:  head trauma;    TECHNIQUE:  5-mm axial images were obtained through the head without the use of contrast.  Coronal and sagittal reformats were performed.    COMPARISON:  MRI 02/09/2018 CT 11/29/2017.    FINDINGS:  No CT findings to suggest an acute major vascular distribution infarct.  No intracranial hemorrhage.  There is mild generalized cerebral volume loss.  No hydrocephalus.  No midline shift or mass effect.  No abnormal intra or extra-axial fluid collections.    Paranasal sinuses and mastoid air cells are clear.  No acute osseous abnormalities.  Subcutaneous soft tissues are within normal limits.  Globes are symmetric.                               X-Ray Chest AP Portable (Final result)  Result time 07/20/19 13:01:14    Final result by Carlos Guzman MD (07/20/19 13:01:14)                 Impression:      1. No acute radiographic findings in the chest on this single view.      Electronically signed by: Carlos Guzman MD  Date:    07/20/2019  Time:    13:01             Narrative:    EXAMINATION:  XR CHEST AP PORTABLE    CLINICAL HISTORY:  fatigue;    TECHNIQUE:  Single frontal view of the chest was performed.    COMPARISON:  Radiograph 06/11/2018.    FINDINGS:  Right-sided chest port with tip projecting over the SVC.  Mediastinal structures are midline.  Hilar contours are unremarkable.  Cardiac silhouette is normal in size.  Lung volumes are symmetric.  No consolidation.  No pneumothorax or pleural effusions.  No free air beneath the diaphragm.  No acute osseous abnormalities.                                 Medical Decision Making:   Initial Assessment:   Ade Castellano is a 73 y.o. female presenting for an emergent evaluation of fatigue and generalized weakness, that began x4 days ago and worsened today . Exam revealed diminished breath sounds and bilateral wheezing. Will obtain labs, and given the extent of pain, imaging. I will treat the  pt's symptoms appropriately and reassess.  Differential Diagnosis:   Differential Diagnosis includes, but is not limited to:  CVA/TIA, vertigo, anemia/blood loss, cardiogenic shock, arrhythmia, orthostatic hypotension, dehydration, medication side effect, vitamin deficiency, liver disease, hypothyroidism, drug intoxication/withdrawal, metabolic derangement.    Patient feels better after IV fluids and breathing treatments.  Patient is not hypoxic.  Labs are notable for an elevated lactic acid which likely secondary to dehydration.  White count is low but this is not abnormal with chemotherapy.  Will recheck lactic acid after IV fluids.  No signs of pneumonia and/or UTI.  CT head negative. Moving all extremities.  Clinically looks improved.  Due to elevation lactic acid IM admitting for observation.  Patient understands and agrees.  I spoke with Nahum and patient was subsequently admitted  Homar Orellana    Clinical Tests:   Lab Tests: Ordered and Reviewed  Radiological Study: Ordered and Reviewed  Medical Tests: Ordered and Reviewed  ED Management:  4:05 PM  Updated patient of diagnosis and results.                      Clinical Impression:       ICD-10-CM ICD-9-CM   1. Dehydration E86.0 276.51   2. Fatigue R53.83 780.79             Scribe attestation: I, Homar Orellana, personally performed the services described in this documentation. All medical record entries made by the scribe were at my direction and in my presence.  I have reviewed the chart and agree that the record reflects my personal performance and is accurate and complete.                   Homar Orellana MD  07/20/19 1906

## 2019-07-20 NOTE — NURSING
Pt arrive to the unit by stretcher accompanied by transport.  Assisted pt to bed. Tele monitoring initiated.  Admit assessment initiated.  Pt is AAOx4.  No distress noted.

## 2019-07-20 NOTE — ED TRIAGE NOTES
Pt arrives to ED and c/o SOB that has been getting worst over the past cpl days. Pt did fall out of bed last night. Pt denies any LOC. Pt did hit the left side of head. Pt denies  HA, chest pain, Lightheaded, ort any dizziness. Denies any fevers or chills. Pt is A&Ox3 and skin is PWD. Ps does have CA on right shoulder and in the lymph nodes. Hx of lung CA as well.

## 2019-07-21 PROBLEM — D70.3 NEUTROPENIA ASSOCIATED WITH INFECTION: Status: ACTIVE | Noted: 2019-07-21

## 2019-07-21 PROBLEM — D70.1 CHEMOTHERAPY-INDUCED NEUTROPENIA: Status: ACTIVE | Noted: 2019-07-21

## 2019-07-21 PROBLEM — T45.1X5A CHEMOTHERAPY-INDUCED NEUTROPENIA: Status: ACTIVE | Noted: 2019-07-21

## 2019-07-21 LAB
ALBUMIN SERPL BCP-MCNC: 3.4 G/DL (ref 3.5–5.2)
ALP SERPL-CCNC: 39 U/L (ref 55–135)
ALT SERPL W/O P-5'-P-CCNC: 30 U/L (ref 10–44)
ANION GAP SERPL CALC-SCNC: 10 MMOL/L (ref 8–16)
AST SERPL-CCNC: 25 U/L (ref 10–40)
BASOPHILS # BLD AUTO: 0.02 K/UL (ref 0–0.2)
BASOPHILS NFR BLD: 0.9 % (ref 0–1.9)
BILIRUB SERPL-MCNC: 0.7 MG/DL (ref 0.1–1)
BUN SERPL-MCNC: 8 MG/DL (ref 8–23)
CALCIUM SERPL-MCNC: 8.6 MG/DL (ref 8.7–10.5)
CHLORIDE SERPL-SCNC: 105 MMOL/L (ref 95–110)
CO2 SERPL-SCNC: 26 MMOL/L (ref 23–29)
CREAT SERPL-MCNC: 0.7 MG/DL (ref 0.5–1.4)
DIFFERENTIAL METHOD: ABNORMAL
EOSINOPHIL # BLD AUTO: 0 K/UL (ref 0–0.5)
EOSINOPHIL NFR BLD: 0.5 % (ref 0–8)
ERYTHROCYTE [DISTWIDTH] IN BLOOD BY AUTOMATED COUNT: 14.1 % (ref 11.5–14.5)
EST. GFR  (AFRICAN AMERICAN): >60 ML/MIN/1.73 M^2
EST. GFR  (NON AFRICAN AMERICAN): >60 ML/MIN/1.73 M^2
GLUCOSE SERPL-MCNC: 146 MG/DL (ref 70–110)
HCT VFR BLD AUTO: 32.7 % (ref 37–48.5)
HGB BLD-MCNC: 10.8 G/DL (ref 12–16)
LACTATE SERPL-SCNC: 1.4 MMOL/L (ref 0.5–2.2)
LYMPHOCYTES # BLD AUTO: 0.6 K/UL (ref 1–4.8)
LYMPHOCYTES NFR BLD: 26.1 % (ref 18–48)
MCH RBC QN AUTO: 30.4 PG (ref 27–31)
MCHC RBC AUTO-ENTMCNC: 33 G/DL (ref 32–36)
MCV RBC AUTO: 92 FL (ref 82–98)
MONOCYTES # BLD AUTO: 0.7 K/UL (ref 0.3–1)
MONOCYTES NFR BLD: 30.6 % (ref 4–15)
NEUTROPHILS # BLD AUTO: 0.9 K/UL (ref 1.8–7.7)
NEUTROPHILS NFR BLD: 43.7 % (ref 38–73)
PLATELET # BLD AUTO: 224 K/UL (ref 150–350)
PMV BLD AUTO: 8.7 FL (ref 9.2–12.9)
POTASSIUM SERPL-SCNC: 3.5 MMOL/L (ref 3.5–5.1)
PROT SERPL-MCNC: 5.8 G/DL (ref 6–8.4)
RBC # BLD AUTO: 3.55 M/UL (ref 4–5.4)
SODIUM SERPL-SCNC: 141 MMOL/L (ref 136–145)
WBC # BLD AUTO: 2.22 K/UL (ref 3.9–12.7)

## 2019-07-21 PROCEDURE — 96361 HYDRATE IV INFUSION ADD-ON: CPT | Performed by: EMERGENCY MEDICINE

## 2019-07-21 PROCEDURE — 36415 COLL VENOUS BLD VENIPUNCTURE: CPT | Mod: HCNC

## 2019-07-21 PROCEDURE — 63600175 PHARM REV CODE 636 W HCPCS: Mod: HCNC | Performed by: INTERNAL MEDICINE

## 2019-07-21 PROCEDURE — 63600175 PHARM REV CODE 636 W HCPCS: Mod: HCNC | Performed by: HOSPITALIST

## 2019-07-21 PROCEDURE — 94640 AIRWAY INHALATION TREATMENT: CPT | Mod: HCNC

## 2019-07-21 PROCEDURE — 25000003 PHARM REV CODE 250: Mod: HCNC | Performed by: HOSPITALIST

## 2019-07-21 PROCEDURE — 85025 COMPLETE CBC W/AUTO DIFF WBC: CPT | Mod: HCNC

## 2019-07-21 PROCEDURE — 25000242 PHARM REV CODE 250 ALT 637 W/ HCPCS: Mod: HCNC | Performed by: HOSPITALIST

## 2019-07-21 PROCEDURE — 21400001 HC TELEMETRY ROOM: Mod: HCNC

## 2019-07-21 PROCEDURE — 80053 COMPREHEN METABOLIC PANEL: CPT | Mod: HCNC

## 2019-07-21 PROCEDURE — 83605 ASSAY OF LACTIC ACID: CPT | Mod: HCNC

## 2019-07-21 PROCEDURE — 99223 1ST HOSP IP/OBS HIGH 75: CPT | Mod: HCNC,,, | Performed by: INTERNAL MEDICINE

## 2019-07-21 PROCEDURE — 94761 N-INVAS EAR/PLS OXIMETRY MLT: CPT | Mod: HCNC

## 2019-07-21 PROCEDURE — 25000003 PHARM REV CODE 250: Mod: HCNC | Performed by: NURSE PRACTITIONER

## 2019-07-21 PROCEDURE — 27000221 HC OXYGEN, UP TO 24 HOURS: Mod: HCNC

## 2019-07-21 PROCEDURE — 96376 TX/PRO/DX INJ SAME DRUG ADON: CPT | Performed by: EMERGENCY MEDICINE

## 2019-07-21 PROCEDURE — 99223 PR INITIAL HOSPITAL CARE,LEVL III: ICD-10-PCS | Mod: HCNC,,, | Performed by: INTERNAL MEDICINE

## 2019-07-21 RX ORDER — ISOSORBIDE MONONITRATE 30 MG/1
30 TABLET, EXTENDED RELEASE ORAL DAILY
Status: DISCONTINUED | OUTPATIENT
Start: 2019-07-22 | End: 2019-07-23 | Stop reason: HOSPADM

## 2019-07-21 RX ORDER — SODIUM CHLORIDE 9 MG/ML
INJECTION, SOLUTION INTRAVENOUS CONTINUOUS
Status: ACTIVE | OUTPATIENT
Start: 2019-07-21 | End: 2019-07-21

## 2019-07-21 RX ADMIN — ENOXAPARIN SODIUM 40 MG: 100 INJECTION SUBCUTANEOUS at 05:07

## 2019-07-21 RX ADMIN — POLYETHYLENE GLYCOL 3350 17 G: 17 POWDER, FOR SOLUTION ORAL at 08:07

## 2019-07-21 RX ADMIN — SODIUM CHLORIDE: 0.9 INJECTION, SOLUTION INTRAVENOUS at 12:07

## 2019-07-21 RX ADMIN — ASPIRIN 81 MG: 81 TABLET, COATED ORAL at 08:07

## 2019-07-21 RX ADMIN — MONTELUKAST 10 MG: 10 TABLET, FILM COATED ORAL at 08:07

## 2019-07-21 RX ADMIN — VANCOMYCIN HYDROCHLORIDE 1750 MG: 500 INJECTION, POWDER, LYOPHILIZED, FOR SOLUTION INTRAVENOUS at 09:07

## 2019-07-21 RX ADMIN — PIPERACILLIN, TAZOBACTAM 4.5 G: 4; .5 INJECTION, POWDER, LYOPHILIZED, FOR SOLUTION INTRAVENOUS at 04:07

## 2019-07-21 RX ADMIN — PIPERACILLIN, TAZOBACTAM 4.5 G: 4; .5 INJECTION, POWDER, LYOPHILIZED, FOR SOLUTION INTRAVENOUS at 08:07

## 2019-07-21 RX ADMIN — RAMELTEON 8 MG: 8 TABLET, FILM COATED ORAL at 11:07

## 2019-07-21 RX ADMIN — IPRATROPIUM BROMIDE AND ALBUTEROL SULFATE 3 ML: .5; 3 SOLUTION RESPIRATORY (INHALATION) at 03:07

## 2019-07-21 RX ADMIN — IPRATROPIUM BROMIDE AND ALBUTEROL SULFATE 3 ML: .5; 3 SOLUTION RESPIRATORY (INHALATION) at 08:07

## 2019-07-21 RX ADMIN — SODIUM CHLORIDE: 0.9 INJECTION, SOLUTION INTRAVENOUS at 06:07

## 2019-07-21 RX ADMIN — IPRATROPIUM BROMIDE AND ALBUTEROL SULFATE 3 ML: .5; 3 SOLUTION RESPIRATORY (INHALATION) at 11:07

## 2019-07-21 RX ADMIN — TBO-FILGRASTIM 411 MCG: 480 INJECTION, SOLUTION SUBCUTANEOUS at 08:07

## 2019-07-21 RX ADMIN — PIPERACILLIN, TAZOBACTAM 4.5 G: 4; .5 INJECTION, POWDER, LYOPHILIZED, FOR SOLUTION INTRAVENOUS at 12:07

## 2019-07-21 RX ADMIN — ACETAMINOPHEN 650 MG: 325 TABLET, FILM COATED ORAL at 08:07

## 2019-07-21 RX ADMIN — SODIUM CHLORIDE 500 ML: 0.9 INJECTION, SOLUTION INTRAVENOUS at 11:07

## 2019-07-21 RX ADMIN — OXYCODONE HYDROCHLORIDE AND ACETAMINOPHEN 1 TABLET: 5; 325 TABLET ORAL at 08:07

## 2019-07-21 NOTE — H&P
[facility] Medicine  History & Physical    Patient Name: Ade Castellano  MRN: [id number]  Admission Date: 7/20/2019  Attending Physician: Bharti Farah MD   Primary Care Provider: Jeannine Huitron MD         Patient information was obtained from patient, relative(s), past medical records and ER records.     Subjective:     Principal Problem:Neutropenic fever    Chief Complaint:   Chief Complaint   Patient presents with    Fatigue     Chemo 8 days ago. Pt reports weakness x 4 days. Worsening today. +moist productive cough and wheezing per EMS. Duo Neb given in route. No distress. RR unlabored. Denies dizziness, SOB and CP at present.         HPI: 73 y.o. female with COPD, CAD, hypertension, hyperlipidemia, SUNITHA, arthritis, peptic ulcer disease, squamous cell carcinoma of the lung, and ductal carcinoma of the breast presents with a complaint of worsening fatigue over the past 4 days.  Last chemo was 7/5/2019.  She has been overdoing it little performing yd work and he and not drinking much fluids.  She reports her shortness of breath and cough is worse than baseline.  She has home oxygen, but does not routinely wear it.  Denies fever, chills, chest pain, palpitations, orthopnea, PND, dizziness, syncope, nausea, vomiting, diarrhea, abdominal pain, bloody or black stools, dysuria, frequency, or urgency.  In the ED she was given IV fluids and nebulizer treatment with improvement.  Routine labs, chest x-ray, EKG, CT head, and urinalysis were unremarkable for any acute abnormality other than an elevated lactic acid of 2.9.  This was repeated after IV fluid administration and resulted as 3.5.  placed in observation to trend her lactic acid.  She was afebrile while in the ED, but upon arrival to the floor she spiked a temperature of 101° F. repeat blood cultures and broad-spectrum antibiotics initiated.    Past Medical History:   Diagnosis Date    Acute respiratory failure with  hypoxia and hypercapnia 11/29/2017    Angina pectoris     Arthritis     Bell's palsy     left facial weakness    Breast cancer     RIGHT    CAD (coronary artery disease)     Cervical cancer     Chronic bronchitis     COPD (chronic obstructive pulmonary disease)     Dr. Katz    Dental bridge present     Emphysema of lung     H/O colonoscopy 06/2017    due for repeat colonsocopy in 6/2018    History of heart artery stent     Dr. Ortiz  x2 stents    Hyperlipidemia     Hypertension     Lung cancer     Myocardial infarction     SUNITHA (obstructive sleep apnea)     intolerant to mask    Pneumonia     Pneumonia due to other staphylococcus     PUD (peptic ulcer disease)     Severe anemia 6/11/2018    Sleep apnea     Vaginal delivery     x1       Past Surgical History:   Procedure Laterality Date    ADENOIDECTOMY      EPINVU-VMIKON-FB N/A 2/14/2018    Performed by [facility] OR    BIOPSY-SENTINEL NODE (82281) Right 7/11/2014    Performed by [facility]on [facility]berry [facility] at [facility] OR    BIOPSY-ULTRASOUND GUIDED CORE Right 6/17/2014    Performed by [facility] at [facility] OR    BREAST BIOPSY Right     x3    BREAST LUMPECTOMY      BREAST SURGERY      lumpectomy right side     CERVIX SURGERY      cone    COLONOSCOPY N/A 1/29/2019    Performed by Emmanuel Perez [facility] at [facility] ENDO    COLONOSCOPY N/A 11/28/2018    Performed by Nura Urbina MD at [facility] ENDO    COLONOSCOPY N/A 6/30/2017    Performed by Julio Rudd MD at [facility] ENDO    COLONOSCOPY N/A 3/17/2017    Performed by [facility] Rudd [facility] at [facility] ENDO    ESOPHAGOGASTRODUODENOSCOPY (EGD) N/A 10/11/2017    Performed by Julio Rudd [facility] at [facility] ENDO    EYE SURGERY Bilateral 06/08/2018    cataract     NAXGEHSZN-YFJG-F-CATH N/A 3/7/2018    Performed by [facility] at [facility] OR    LUMPECTOMY RIGHT BREAST S/P NEEDLE LOCALIZATION  Right 7/11/2014    Performed by Kameorn Newberry MD at [facility] OR    PORTACATH PLACEMENT Right  01/2018    sweat glands axillary regions Bilateral     TONSILLECTOMY         Review of patient's allergies indicates:  No Known Allergies    No current facility-administered medications on file prior to encounter.      Current Outpatient Medications on File Prior to Encounter   Medication Sig    albuterol (PROVENTIL) 2.5 mg /3 mL (0.083 %) nebulizer solution NEBULIZE 1 vial EVERY 6 HOURS AS NEEDED FOR WHEEZING    albuterol (VENTOLIN HFA) 90 mcg/actuation inhaler INHALE 2 PUFF(S) BY MOUTH EVERY 4 - 6 HOURS AS NEEDED FOR SHORTNESS OF BREATH or WHEEZING    aspirin (ECOTRIN) 325 MG EC tablet Take 325 mg by mouth once daily.    desloratadine (CLARINEX) 5 mg tablet Take 1 tablet (5 mg total) by mouth once daily.    fluticasone (FLONASE) 50 mcg/actuation nasal spray USE TWO SPRAYS IN EACH NOSTRIL ONCE A DAY    isosorbide mononitrate (IMDUR) 60 MG 24 hr tablet Take 1 tablet (60 mg total) by mouth once daily.    metoprolol tartrate (LOPRESSOR) 25 MG tablet Take 1 tablet (25 mg total) by mouth 2 (two) times daily.    montelukast (SINGULAIR) 10 mg tablet Take 10 mg by mouth nightly. at bedtime.    NITROSTAT 0.4 mg SL tablet place 1 tablet under the tongue AS NEEDED no more than 3 in 15 minutes    ondansetron (ZOFRAN) 8 MG tablet Take 1 tablet (8 mg total) by mouth every 12 (twelve) hours as needed for Nausea.    oxyCODONE-acetaminophen (PERCOCET) 5-325 mg per tablet Take 1 tablet by mouth every 12 (twelve) hours as needed for Pain.    rosuvastatin (CRESTOR) 20 MG tablet take 1/2 TABLET(S) BY MOUTH ONCE A DAY    TRELEGY ELLIPTA 100-62.5-25 mcg DsDv INHALE ONE PUFF INTO THE LUNGS ONCE A DAY     Family History     Problem Relation (Age of Onset)    Breast cancer Mother    Cancer Mother, Father, Sister, Maternal Grandmother, Maternal Grandfather, Paternal Grandmother, Paternal Grandfather, Sister    Heart attack Sister    Heart disease Mother, Maternal Grandmother, Maternal Grandfather    No Known Problems Sister         Tobacco Use    Smoking status: Former Smoker     Packs/day: 0.25     Years: 50.00     Pack years: 12.50     Types: Cigarettes     Last attempt to quit: 2017     Years since quittin.6    Smokeless tobacco: Never Used   Substance and Sexual Activity    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 Negative for chills.   Eyes: Negative for photophobia and visual disturbance.   Respiratory: Positive for cough and shortness of breath.    Cardiovascular: Negative for chest pain, palpitations and leg swelling.   Gastrointestinal: Negative for abdominal pain, diarrhea, nausea and vomiting.   Genitourinary: Negative for dysuria, frequency and urgency.   Skin: Negative for pallor, rash and wound.   Neurological: Negative for light-headedness and headaches.   Psychiatric/Behavioral: Negative for confusion and decreased concentration.     Objective:     Vital Signs (Most Recent):  Temp: 98.6 °F (37 °C) (19)  Pulse: 80 (19)  Resp: 19 (19)  BP: (!) 120/58 (19)  SpO2: 100 % (19) Vital Signs (24h Range):  Temp:  [98.6 °F (37 °C)-99.1 °F (37.3 °C)] 98.6 °F (37 °C)  Pulse:  [78-95] 80  Resp:  [18-19] 19  SpO2:  [95 %-100 %] 100 %  BP: (115-126)/(53-81) 120/58     Weight: 83.5 kg (184 lb)  Body mass index is 33.65 kg/m².    Physical Exam   Constitutional: She is oriented to person, place, and time. She appears well-developed and well-nourished. She appears ill. No distress.   HENT:   Head: Normocephalic and atraumatic.   Right Ear: External ear normal.   Left Ear: External ear normal.   Nose: Nose normal.   Mouth/Throat: Oropharynx is clear and moist.   Eyes: Pupils are equal, round, and reactive to light. Conjunctivae and EOM are normal.   Neck: Normal range of motion. Neck supple.   Cardiovascular: Normal rate, regular rhythm and intact distal  pulses.   Pulmonary/Chest: No respiratory distress. She has wheezes. She has rhonchi.   Abdominal: Soft. Bowel sounds are normal. She exhibits no distension. There is no tenderness.   No palpable hepatomegaly or splenomegaly    Musculoskeletal: Normal range of motion. She exhibits no edema or tenderness.   Neurological: She is alert and oriented to person, place, and time.   Skin: Skin is warm and dry.   Psychiatric: She has a normal mood and affect. Thought content normal.   Nursing note and vitals reviewed.        CRANIAL NERVES     CN III, IV, VI   Pupils are equal, round, and reactive to light.  Extraocular motions are normal.        Significant Labs: All pertinent labs within the past 24 hours have been reviewed.    Significant Imaging: I have reviewed all pertinent imaging results/findings within the past 24 hours.    Assessment/Plan:     * Neutropenic fever  Started chemo effusions earlier this month, now with leukopenia and temperature greater than 101° F. source as yet to be determined but suspect pulmonary; however,  Chest x-ray in the ED was without focal consolidation or opacity.  Blood cultures are pending.  Fever and elevated lactic acid of 3.5 for concerning.  Initiate broad-spectrum IV antibiotics, continue IV fluids.  Trend lactic acid.  Follow cultures.    Chronic obstructive pulmonary disease with acute lower respiratory infection  Continue scheduled nebs and antibiotics.  Consider the addition of corticosteroids if significant wheezing or shortness of breath develops.    Metastatic breast cancer  Started chemo infusions earlier this month, 07/05/2019.  Provide as needed antiemetics and analgesics.    Squamous cell carcinoma of right lung  Started chemo infusions earlier this month, 07/05/2019    DNR (do not resuscitate)  Noted    Stable angina  No acute issue, continue Imdur    Hyperlipidemia LDL goal <70  Continue statin    SUNITHA (obstructive sleep apnea)  Declined CPAP    CAD (coronary artery  disease)  No acute issue, continue home regimen.    Benign hypertensive heart disease without heart failure  Well controlled, continue home medications and monitor blood pressure, adjust as needed.      VTE Risk Mitigation (From admission, onward)        Ordered     enoxaparin injection 40 mg  Daily      07/20/19 1825     IP VTE HIGH RISK PATIENT  Once      07/20/19 1801     Place sequential compression device  Until discontinued      07/20/19 1801     Place ZAIDA hose  Until discontinued      07/20/19 1801        Nahum Tamayo Jr., APRN, AGABoston Home for Incurables-BC  Hospitalist - Department of Hospital Medicine  [facility] [address] [place] [address]  Office #: [phone]; Pager #: [phone]

## 2019-07-21 NOTE — ASSESSMENT & PLAN NOTE
Unclear etiology at this time.  Patient denies recent illnesses or sick contacts.  She reports feeling in her usual state health until 2 days ago.  Monitor closely and consult ID while awaiting blood cultures

## 2019-07-21 NOTE — ASSESSMENT & PLAN NOTE
"- Labs have been reviewed.  - absolute neutrophil count today was 0.9 k/uL, up from 0.4 k/uL, consistent with count recovery.  - she did not receive pegfilgrastim On Pro with cycle #1 of chemotherapy for unclear reasons. Dr. Holliday wrote in her previous note that she "will also receive growth factor support."  - I have ordered one dose of tbo-filgrastim this evening. Follow up tomorrow's WBC count and neutrophil count.  - recommend that she receive pegfilgrastim with future cycles of chemotherapy.  "

## 2019-07-21 NOTE — HOSPITAL COURSE
This is a very pleasant 73-year-old female who was placed in observation for neutropenic fever.  Patient is a cancer patient and receives chemo every 3 weeks last dose was July 2, 2019.  She tells me that she became weak and fatigued about 2 days ago she was found to have fever of 102.2 of unclear etiology at the time of presentation.  Her blood cultures are pending she is on dual antibiotics vanc and Zosyn.  She is neutropenic with the absolute neutrophil count of 946.  The patient denies any nausea vomiting it at home, sick contacts however her blood pressure has been running low while in the hospital last 87/44.  She has received multiple boluses in the emergency room and received another bolus this morning 500 which improved her blood pressure from 89/51 to 110/51.  Her Home blood pressure meds are being held secondary to hypotension.  She remained ill appearing, last low-grade temp was 7:00 a.m. this morning at 99.5, at 5:00 a.m. was 100.  Her CT head and chest x-ray are non revealing, and urinalysis unimpressive.  At the time of presentation her lactic acid was 3.5    Given her age, hemodynamic instability, hypotension, cancer, neutropenia, and fever she is a risk versus sudden acute decline.  Will continue to give IV fluids as pressure support for now and monitor closely continue antibiotics and consulted Hematology and Infectious Disease---blood cultures pending supportive care. The patient's neutropenia resolved. ID was consulted for any recs for discharge planned on 7/23. PT/OT were consulted and recommended out patient PT/OT. ID rec: Cipro and Augmentin for 14 days. Rest of hospital course was unremarkable. The patient will be discharged today. Activity as tolerated. Diet- low NA   Follow up with PCP in one week

## 2019-07-21 NOTE — SUBJECTIVE & OBJECTIVE
Past Medical History:   Diagnosis Date    Acute respiratory failure with hypoxia and hypercapnia 11/29/2017    Angina pectoris     Arthritis     Bell's palsy     left facial weakness    Breast cancer     RIGHT    CAD (coronary artery disease)     Cervical cancer     Chronic bronchitis     COPD (chronic obstructive pulmonary disease)     Dr. Katz    Dental bridge present     Emphysema of lung     H/O colonoscopy 06/2017    due for repeat colonsocopy in 6/2018    History of heart artery stent     Dr. Ortiz  x2 stents    Hyperlipidemia     Hypertension     Lung cancer     Myocardial infarction     SUNITHA (obstructive sleep apnea)     intolerant to mask    Pneumonia     Pneumonia due to other staphylococcus     PUD (peptic ulcer disease)     Severe anemia 6/11/2018    Sleep apnea     Vaginal delivery     x1       Past Surgical History:   Procedure Laterality Date    ADENOIDECTOMY      TEYWUS-WSODHL-JK N/A 2/14/2018    Performed by [facility] Provider at [facility] OR    BIOPSY-SENTINEL NODE (14950) Right 7/11/2014    Performed by [facility]berry [facility] at [facility] OR    BIOPSY-ULTRASOUND GUIDED CORE Right 6/17/2014    Performed by [facility] Provider at [facility] OR    BREAST BIOPSY Right     x3    BREAST LUMPECTOMY      BREAST SURGERY      lumpectomy right side     CERVIX SURGERY      cone    COLONOSCOPY N/A 1/29/2019    Performed by Emmanuel Perez [facility] at [facility] ENDO    COLONOSCOPY N/A 11/28/2018    Performed by Nura [facility]rbina [facility] at [facility] ENDO    COLONOSCOPY N/A 6/30/2017    Performed by [facility]io Rudd [facility] at [facility] ENDO    COLONOSCOPY N/A 3/17/2017    [facility]formed [facility] at [facility] ENDO    ESOPHAGOGASTRODUODENOSCOPY (EGD) N/A 10/11/2017    Performed by [facility] at [facility] ENDO    EYE SURGERY Bilateral 06/08/2018    cataract     [id number]-MXVU-Y-CATH N/A 3/7/2018    Performed by [facility] Provider at [facility] OR    LUMPECTOMY RIGHT BREAST S/P NEEDLE LOCALIZATION  Right 7/11/2014     [facility]    [facility] PLACEMENT Right 01/2018    sweat glands axillary regions Bilateral     TONSILLECTOMY         Review of patient's allergies indicates:  No Known Allergies    No current facility-administered medications on file prior to encounter.      Current Outpatient Medications on File Prior to Encounter   Medication Sig    albuterol (PROVENTIL) 2.5 mg /3 mL (0.083 %) nebulizer solution NEBULIZE 1 vial EVERY 6 HOURS AS NEEDED FOR WHEEZING    albuterol (VENTOLIN HFA) 90 mcg/actuation inhaler INHALE 2 PUFF(S) BY MOUTH EVERY 4 - 6 HOURS AS NEEDED FOR SHORTNESS OF BREATH or WHEEZING    aspirin (ECOTRIN) 325 MG EC tablet Take 325 mg by mouth once daily.    fluticasone (FLONASE) 50 mcg/actuation nasal spray USE TWO SPRAYS IN EACH NOSTRIL ONCE A DAY    isosorbide mononitrate (IMDUR) 60 MG 24 hr tablet Take 1 tablet (60 mg total) by mouth once daily.    metoprolol tartrate (LOPRESSOR) 25 MG tablet Take 1 tablet (25 mg total) by mouth 2 (two) times daily.    montelukast (SINGULAIR) 10 mg tablet Take 10 mg by mouth nightly. at bedtime.    ondansetron (ZOFRAN) 8 MG tablet Take 1 tablet (8 mg total) by mouth every 12 (twelve) hours as needed for Nausea.    oxyCODONE-acetaminophen (PERCOCET) 5-325 mg per tablet Take 1 tablet by mouth every 12 (twelve) hours as needed for Pain.    rosuvastatin (CRESTOR) 20 MG tablet take 1/2 TABLET(S) BY MOUTH ONCE A DAY    TRELEGY ELLIPTA 100-62.5-25 mcg DsDv INHALE ONE PUFF INTO THE LUNGS ONCE A DAY    NITROSTAT 0.4 mg SL tablet place 1 tablet under the tongue AS NEEDED no more than 3 in 15 minutes     Family History     Problem Relation (Age of Onset)    Breast cancer Mother    Cancer Mother, Father, Sister, Maternal Grandmother, Maternal Grandfather, Paternal Grandmother, Paternal Grandfather, Sister    Heart attack Sister    Heart disease Mother, Maternal Grandmother, Maternal Grandfather    No Known Problems Sister        Tobacco  Use    Smoking status: Former Smoker     Packs/day: 0.25     Years: 50.00     Pack years: 12.50     Types: Cigarettes     Last attempt to quit: 2017     Years since quittin.6    Smokeless tobacco: Never Used   Substance and Sexual Activity    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   Eyes: Negative for photophobia and visual disturbance.   Respiratory: Positive for cough and shortness of breath.    Cardiovascular: Negative for chest pain, palpitations and leg swelling.   Gastrointestinal: Negative for abdominal pain, diarrhea, nausea and vomiting.   Genitourinary: Negative for dysuria, frequency and urgency.   Musculoskeletal: Positive for myalgias.   Skin: Negative for pallor, rash and wound.   Neurological: Positive for weakness. Negative for light-headedness and headaches.   Psychiatric/Behavioral: Negative for confusion and decreased concentration.     Objective:     Vital Signs (Most Recent):  Temp: 98.5 °F (36.9 °C) (19 1130)  Pulse: 70 (19 1210)  Resp: 18 (19 1130)  BP: (!) 110/51 (19 1223)  SpO2: 100 % (19 1130) Vital Signs (24h Range):  Temp:  [98.5 °F (36.9 °C)-101.2 °F (38.4 °C)] 98.5 °F (36.9 °C)  Pulse:  [65-95] 70  Resp:  [17-19] 18  SpO2:  [95 %-100 %] 100 %  BP: ()/(44-81) 110/51     Weight: 82.2 kg (181 lb 3.5 oz)  Body mass index is 33.15 kg/m².    Physical Exam   Constitutional: She is oriented to person, place, and time. She appears well-developed and well-nourished. She appears ill. No distress.   HENT:   Head: Normocephalic and atraumatic.   Right Ear: External ear normal.   Left Ear: External ear normal.   Nose: Nose normal.   Mouth/Throat: Oropharynx is clear and moist.   Eyes: Pupils are equal, round, and reactive to light. Conjunctivae and EOM are normal.   Neck: Normal range of motion.  Neck supple.   Cardiovascular: Normal rate, regular rhythm and intact distal pulses.   Pulmonary/Chest: No respiratory distress. She has wheezes. She has rhonchi.   Abdominal: Soft. Bowel sounds are normal. She exhibits no distension. There is no tenderness.   No palpable hepatomegaly or splenomegaly    Musculoskeletal: Normal range of motion. She exhibits no edema or tenderness.   Neurological: She is alert and oriented to person, place, and time.   Skin: Skin is warm and dry.   Psychiatric: She has a normal mood and affect. Thought content normal.   Nursing note and vitals reviewed.        CRANIAL NERVES     CN III, IV, VI   Pupils are equal, round, and reactive to light.  Extraocular motions are normal.        Significant Labs: All pertinent labs within the past 24 hours have been reviewed.    Significant Imaging: I have reviewed all pertinent imaging results/findings within the past 24 hours.

## 2019-07-21 NOTE — PLAN OF CARE
07/21/19 0847   Discharge Assessment   Assessment Type Discharge Planning Assessment   Confirmed/corrected address and phone number on facesheet? Yes   Assessment information obtained from? Patient   Communicated expected length of stay with patient/caregiver yes   Prior to hospitilization cognitive status: Alert/Oriented   Prior to hospitalization functional status: Independent;Assistive Equipment  (O2)   Current cognitive status: Alert/Oriented   Current Functional Status: Independent;Assistive Equipment  (O2)   Facility Arrived From: Home   Lives With alone   Able to Return to Prior Arrangements yes   Is patient able to care for self after discharge? Yes   Who are your caregiver(s) and their phone number(s)? Ailyn: [phone]   Patient's perception of discharge disposition home or selfcare   Readmission Within the Last 30 Days no previous admission in last 30 days   Patient currently being followed by outpatient case management? No   Patient currently receives any other outside agency services? No   Equipment Currently Used at Home oxygen  ([employer])   Do you have any problems affording any of your prescribed medications? No   Is the patient taking medications as prescribed? yes   Does the patient have transportation home? Yes   Transportation Anticipated car, drives self;family or friend will provide   Does the patient receive services at the [facility] Clinic? No   Discharge Plan A Home   Discharge Plan B Other  (TBD)   DME Needed Upon Discharge  other (see comments)  (TBD)   Patient/Family in Agreement with Plan yes   SW Role explained to patient; two patient identifiers recognized; SW contact information placed on Communication board. Discussed patient managing health care at home; determined who would be helping patient at home with recovery: Ailyn will help with recovery at home    PCP: Jeannine Huitron MD Prefers morning appointments    Extended Emergency Contact Information  Primary Emergency  Contact: Sherri March   [facility]  Home Phone: [phone]  Mobile Phone: [phone]  Relation: Relative  Secondary Emergency Contact: Anibal Castellano   [facility]  Home Phone: [phone]  Mobile Phone: [phone]  Relation: Son     [facility] [place] [address]  [address]  [place] [address]  Phone: [phone] Fax: [phone]    Payor: [employer] MANAGED MEDICARE / Plan: HUMAN MEDICARE HMO / Product Type: Capitation /

## 2019-07-21 NOTE — ASSESSMENT & PLAN NOTE
Started chemo effusions earlier this month, now with leukopenia and temperature greater than 101° F. source as yet to be determined but suspect pulmonary; however,  Chest x-ray in the ED was without focal consolidation or opacity.  Blood cultures are pending.  Fever and elevated lactic acid of 3.5 for concerning.  Initiate broad-spectrum IV antibiotics, continue IV fluids.  Trend lactic acid.  Follow cultures.

## 2019-07-21 NOTE — ASSESSMENT & PLAN NOTE
07/20/2019---No acute issue, continue Imdur    07/21/2019---no complaint of chest pain at this time has been hypotensive, will continue M2 or starting tomorrow hold today and start tomorrow at a lower dose secondary to hypotension monitor closely

## 2019-07-21 NOTE — SUBJECTIVE & OBJECTIVE
Oncology Treatment Plan:   OP BREAST AC - DOXORUBICIN CYCLOPHOSPHAMIDE Q3W    Medications:  Continuous Infusions:  Scheduled Meds:   albuterol-ipratropium  3 mL Nebulization Q4H WAKE    aspirin  81 mg Oral Daily    enoxaparin  40 mg Subcutaneous Daily    [START ON 7/22/2019] isosorbide mononitrate  30 mg Oral Daily    montelukast  10 mg Oral Nightly    piperacillin-tazobactam (ZOSYN) IVPB  4.5 g Intravenous Q8H    polyethylene glycol  17 g Oral BID    rosuvastatin  20 mg Oral Daily    tbo-filgrastim  5 mcg/kg Subcutaneous Once    vancomycin (VANCOCIN) IVPB  1,750 mg Intravenous Q24H     PRN Meds:acetaminophen, bisacodyl, ondansetron, oxyCODONE-acetaminophen, prochlorperazine, ramelteon, sodium chloride 0.9%     Review of patient's allergies indicates:  No Known Allergies     Past Medical History:   Diagnosis Date    Acute respiratory failure with hypoxia and hypercapnia 11/29/2017    Angina pectoris     Arthritis     Bell's palsy     left facial weakness    Breast cancer     RIGHT    CAD (coronary artery disease)     Cervical cancer     Chronic bronchitis     COPD (chronic obstructive pulmonary disease)     Dr. Katz    Dental bridge present     Emphysema of lung     H/O colonoscopy 06/2017    due for repeat colonsocopy in 6/2018    History of heart artery stent     Dr. Ortiz  x2 stents    Hyperlipidemia     Hypertension     Lung cancer     Myocardial infarction     SUNITHA (obstructive sleep apnea)     intolerant to mask    Pneumonia     Pneumonia due to other staphylococcus     PUD (peptic ulcer disease)     Severe anemia 6/11/2018    Sleep apnea     Vaginal delivery     x1     Past Surgical History:   Procedure Laterality Date    ADENOIDECTOMY      MYOGIH-AVQNMW-RC N/A 2/14/2018    Performed [facility]    BIOPSY-SENTINEL NODE (67868) Right 7/11/2014    [facility] at [facility] OR    BIOPSY-ULTRASOUND GUIDED CORE Right 6/17/2014     [facility]    BREAST BIOPSY Right     x3    BREAST LUMPECTOMY      BREAST SURGERY      lumpectomy right side     CERVIX SURGERY      cone    COLONOSCOPY N/A 2019    [facility]formed by [facility] ENDO    COLONOSCOPY N/A 2018    Performed by [facility] Urbina [facility] at [facility] ENDO    COLONOSCOPY N/A 2017    Performed by Julio Rudd [facility] at [facility] ENDO    COLONOSCOPY N/A 3/17/2017    [facility] END[facility]    ESOPHAGOGASTRODUODENOSCOPY (EGD) N/A 10/11/2017    [facility] by [facility] ENDO    EYE SURGERY Bilateral 2018    cataract     GKCESXZMR-VGPY-V-CATH N/A 3/7/2018    Per[facility] OR    LUMPECTOMY RIGHT BREAST S/P NEEDLE LOCALIZATION  Right 2014    Performed by [facility] at [facility] OR    PORTACATH PLACEMENT Right 2018    sweat glands axillary regions Bilateral     TONSILLECTOMY       Family History     Problem Relation (Age of Onset)    Breast cancer Mother    Cancer Mother, Father, Sister, Maternal Grandmother, Maternal Grandfather, Paternal Grandmother, Paternal Grandfather, Sister    Heart attack Sister    Heart disease Mother, Maternal Grandmother, Maternal Grandfather    No Known Problems Sister        Tobacco Use    Smoking status: Former Smoker     Packs/day: 0.25     Years: 50.00     Pack years: 12.50     Types: Cigarettes     Last attempt to quit: 2017     Years since quittin.6    Smokeless tobacco: Never Used   Substance and Sexual Activity    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   HENT: Negative for sore throat.    Eyes: Negative for visual disturbance.   Respiratory: Negative for shortness of breath.    Cardiovascular: Negative for chest pain.   Gastrointestinal: Positive for nausea. Negative for abdominal pain.    Genitourinary: Negative for dysuria.   Musculoskeletal: Negative for back pain.   Skin: Negative for rash.   Neurological: Negative for headaches.   Hematological: Does not bruise/bleed easily.   Psychiatric/Behavioral: The patient is not nervous/anxious.      Objective:     Vital Signs (Most Recent):  Temp: 97.6 °F (36.4 °C) (07/21/19 1527)  Pulse: 72 (07/21/19 1555)  Resp: (!) 24 (07/21/19 1555)  BP: (!) 112/57 (07/21/19 1527)  SpO2: 97 % (07/21/19 1555) Vital Signs (24h Range):  Temp:  [97.6 °F (36.4 °C)-101.2 °F (38.4 °C)] 97.6 °F (36.4 °C)  Pulse:  [64-92] 72  Resp:  [17-24] 24  SpO2:  [95 %-100 %] 97 %  BP: ()/(44-59) 112/57     Weight: 82.2 kg (181 lb 3.5 oz)  Body mass index is 33.15 kg/m².  Body surface area is 1.9 meters squared.      Intake/Output Summary (Last 24 hours) at 7/21/2019 1723  Last data filed at 7/21/2019 0603  Gross per 24 hour   Intake 1745 ml   Output --   Net 1745 ml       Physical Exam   Constitutional: She is oriented to person, place, and time. She appears well-developed and well-nourished.   HENT:   Head: Normocephalic and atraumatic.   Eyes: Pupils are equal, round, and reactive to light. EOM are normal.   Neck: Normal range of motion. Neck supple.   Cardiovascular: Normal rate and regular rhythm.   Pulmonary/Chest: Effort normal and breath sounds normal.   Abdominal: Soft. Bowel sounds are normal.   Musculoskeletal: Normal range of motion.   Neurological: She is alert and oriented to person, place, and time.   Skin: Skin is warm and dry.   Port-a-cath is accessed. Skin overlying site is clean/dry/intact.   Psychiatric: She has a normal mood and affect. Her behavior is normal. Judgment and thought content normal.   Nursing note and vitals reviewed.      Significant Labs:   Labs have been reviewed.    Lab Results   Component Value Date    WBC 2.22 (L) 07/21/2019    HGB 10.8 (L) 07/21/2019    HCT 32.7 (L) 07/21/2019    MCV 92 07/21/2019     07/21/2019         Diagnostic  Results:  Chest x-ray (7/20/19): I have personally reviewed the images  - no consolidation or acute process noted.

## 2019-07-21 NOTE — NURSING
Temp noted to be 101.2, PRN tylenol administered. 2 sets of blood cultures ordered per IVAN Gandhi.

## 2019-07-21 NOTE — ASSESSMENT & PLAN NOTE
07/20/2019---Well controlled, continue home medications and monitor blood pressure, adjust as needed.    07/21/2019---hold all home blood pressure medicines secondary to hypotension monitor blood pressure closely and continue IV fluids for pressure support

## 2019-07-21 NOTE — ASSESSMENT & PLAN NOTE
- patient of Dr. Holliday.  - status post cycle #1 of adriamycin/cyclophosphamide. Cycle #2 is scheduled for 7/26/19.  - she is scheduled to see Dr. Perez in clinic on 7/23/19 (Dr. Holliday is out of town).  - I will send Dr. Perez a message giving him information about the neutropenic fever.

## 2019-07-21 NOTE — PROGRESS NOTES
WRITTEN DISCHARGE INSTRUCTIONS  328A    Follow-Up Appointments: A primary care follow-up has been scheduled for you and is listed below.  Follow-up Information     Jeannine Huitron MD. Go on 7/30/2019.    Specialty:  Family Medicine  Why:  9:30am Primary [facility] Follow-Up Appointment scheduled with Dr. Huitron  Contact information:  [address] [place] [id number]  [phone]               HELP AT HOME: Ensure that you have someone to help you and to manage your healthcare at home.  [phone] After discharge for assistance with [facility] On-Call Nurse Care Line open 24/7 for assistance.    Things you are responsible for to Manage Your Care at Home:  1. Getting your prescriptions filled or picking up those prescriptions that have been called in for you.  2. Taking your medication as directed; DO NOT MISS ANY DOSES!  3. Going to your follow-up doctor appointment(s).  This is important because it allows your doctor to monitor your progress and determine if any changes need to be made to your treatment plan.    Thanks for choosing [facility] for your care. Please answer any calls you may receive from [facility]. We want to continue to support you as you manage your healthcare needs.  We are happy to have the opportunity to serve you.

## 2019-07-21 NOTE — CONSULTS
[facility]  Hematology/Oncology  Consult Note    Patient Name: Ade Castellano  MRN: [id number]  Admission Date: 7/20/2019  Hospital Length of Stay: 0 days  Code Status: DNR   Attending Provider: Bharti Farah MD  Consulting Provider: Tawanda Minor MD  Primary Care Physician: Jeannine Huitron MD  Principal Problem:Neutropenic fever    Inpatient consult to Hematology  Consult performed by: Tawanda Minor MD  Consult ordered by: OTIS Johansen  Reason for consult: leukopenia from chemotherapy        Subjective:     HPI:  73 year-old female with recurrent breast cancer on adriamycin/cyclophosphamide q21 chemotherapy (cycle #1 on 7/5/19) was admitted on 7/20/19 for neutropenic fever. Consult is for leukopenia secondary to chemotherapy.    Patient states that she did not receive pegfilgrastim On Pro with her first cycle of chemotherapy. She endorses malaise, nausea. She states she feels better since admission. She denies shortness of breath, chest pain, diarrhea, constipation.      Oncology Treatment Plan:   OP BREAST AC - DOXORUBICIN CYCLOPHOSPHAMIDE Q3W    Medications:  Continuous Infusions:  Scheduled Meds:   albuterol-ipratropium  3 mL Nebulization Q4H WAKE    aspirin  81 mg Oral Daily    enoxaparin  40 mg Subcutaneous Daily    [START ON 7/22/2019] isosorbide mononitrate  30 mg Oral Daily    montelukast  10 mg Oral Nightly    piperacillin-tazobactam (ZOSYN) IVPB  4.5 g Intravenous Q8H    polyethylene glycol  17 g Oral BID    rosuvastatin  20 mg Oral Daily    tbo-filgrastim  5 mcg/kg Subcutaneous Once    vancomycin (VANCOCIN) IVPB  1,750 mg Intravenous Q24H     PRN Meds:acetaminophen, bisacodyl, ondansetron, oxyCODONE-acetaminophen, prochlorperazine, ramelteon, sodium chloride 0.9%     Review of patient's allergies indicates:  No Known Allergies     Past Medical History:   Diagnosis Date    Acute respiratory failure with hypoxia and hypercapnia 11/29/2017    Angina  pectoris     Arthritis     Bell's palsy     left facial weakness    Breast cancer     RIGHT    CAD (coronary artery disease)     Cervical cancer     Chronic bronchitis     COPD (chronic obstructive pulmonary disease)     Dr. Katz    Dental bridge present     Emphysema of lung     H/O colonoscopy 06/2017    due for repeat colonsocopy in 6/2018    History of heart artery stent     Dr. Ortiz  x2 stents    Hyperlipidemia     Hypertension     Lung cancer     Myocardial infarction     SUNITHA (obstructive sleep apnea)     intolerant to mask    Pneumonia     Pneumonia due to other staphylococcus     PUD (peptic ulcer disease)     Severe anemia 6/11/2018    Sleep apnea     Vaginal delivery     x1     Past Surgical History:   Procedure Laterality Date    ADENOIDECTOMY      [id number]-EBLTRA-VE N/A 2/14/2018    Performed by [facility] at [facility] OR    BIOPSY-SENTINEL NODE (58288) Right 7/11/2014    Performed by [facility] at [facility] OR    BIOPSY-ULTRASOUND GUIDED CORE Right 6/17/2014    Performed by [facility] at [facility] OR    BREAST BIOPSY Right     x3    BREAST LUMPECTOMY      BREAST SURGERY      lumpectomy right side     CERVIX SURGERY      cone    COLONOSCOPY N/A 1/29/2019    Performed by [facility] Perez [facility] at [facility] ENDO    COLONOSCOPY N/A 11/28/2018    Performed by Nura Urbina [facility] at [facility] ENDO    COLONOSCOPY N/A 6/30/2017    Performed by Julio Rudd [facility] at [facility] ENDO    COLONOSCOPY N/A 3/17/2017    Performed by [facility]udd [facility] at [facility] ENDO    ESOPHAGOGASTRODUODENOSCOPY (EGD) N/A 10/11/2017    Performed by [facility] at [facility] ENDO    EYE SURGERY Bilateral 06/08/2018    cataract     [id number]-OITY-B-CATH N/A 3/7/2018    Performed by [facility] at [facility] OR    LUMPECTOMY RIGHT BREAST S/P NEEDLE LOCALIZATION  Right 7/11/2014    Performed by [facility] at [facility] OR    PORTACATH PLACEMENT Right 01/2018    sweat glands axillary regions  Bilateral     TONSILLECTOMY       Family History     Problem Relation (Age of Onset)    Breast cancer Mother    Cancer Mother, Father, Sister, Maternal Grandmother, Maternal Grandfather, Paternal Grandmother, Paternal Grandfather, Sister    Heart attack Sister    Heart disease Mother, Maternal Grandmother, Maternal Grandfather    No Known Problems Sister        Tobacco Use    Smoking status: Former Smoker     Packs/day: 0.25     Years: 50.00     Pack years: 12.50     Types: Cigarettes     Last attempt to quit: 2017     Years since quittin.6    Smokeless tobacco: Never Used   Substance and Sexual Activity    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   HENT: Negative for sore throat.    Eyes: Negative for visual disturbance.   Respiratory: Negative for shortness of breath.    Cardiovascular: Negative for chest pain.   Gastrointestinal: Positive for nausea. Negative for abdominal pain.   Genitourinary: Negative for dysuria.   Musculoskeletal: Negative for back pain.   Skin: Negative for rash.   Neurological: Negative for headaches.   Hematological: Does not bruise/bleed easily.   Psychiatric/Behavioral: The patient is not nervous/anxious.      Objective:     Vital Signs (Most Recent):  Temp: 97.6 °F (36.4 °C) (19 1527)  Pulse: 72 (19 1555)  Resp: (!) 24 (19 1555)  BP: (!) 112/57 (19 1527)  SpO2: 97 % (19 1555) Vital Signs (24h Range):  Temp:  [97.6 °F (36.4 °C)-101.2 °F (38.4 °C)] 97.6 °F (36.4 °C)  Pulse:  [64-92] 72  Resp:  [17-24] 24  SpO2:  [95 %-100 %] 97 %  BP: ()/(44-59) 112/57     Weight: 82.2 kg (181 lb 3.5 oz)  Body mass index is 33.15 kg/m².  Body surface area is 1.9 meters squared.      Intake/Output Summary (Last 24 hours) at 2019 1723  Last data filed at 2019 0603  Gross per 24 hour   Intake 1745 ml   Output --   Net 1745 ml  "      Physical Exam   Constitutional: She is oriented to person, place, and time. She appears well-developed and well-nourished.   HENT:   Head: Normocephalic and atraumatic.   Eyes: Pupils are equal, round, and reactive to light. EOM are normal.   Neck: Normal range of motion. Neck supple.   Cardiovascular: Normal rate and regular rhythm.   Pulmonary/Chest: Effort normal and breath sounds normal.   Abdominal: Soft. Bowel sounds are normal.   Musculoskeletal: Normal range of motion.   Neurological: She is alert and oriented to person, place, and time.   Skin: Skin is warm and dry.   Port-a-cath is accessed. Skin overlying site is clean/dry/intact.   Psychiatric: She has a normal mood and affect. Her behavior is normal. Judgment and thought content normal.   Nursing note and vitals reviewed.      Significant Labs:   Labs have been reviewed.    Lab Results   Component Value Date    WBC 2.22 (L) 07/21/2019    HGB 10.8 (L) 07/21/2019    HCT 32.7 (L) 07/21/2019    MCV 92 07/21/2019     07/21/2019         Diagnostic Results:  Chest x-ray (7/20/19): I have personally reviewed the images  - no consolidation or acute process noted.    Assessment/Plan:     * Neutropenic fever  - Labs have been reviewed.  - absolute neutrophil count today was 0.9 k/uL, up from 0.4 k/uL, consistent with count recovery.  - she did not receive pegfilgrastim On Pro with cycle #1 of chemotherapy for unclear reasons. Dr. Holliday wrote in her previous note that she "will also receive growth factor support."  - I have ordered one dose of tbo-filgrastim this evening. Follow up tomorrow's WBC count and neutrophil count.  - recommend that she receive pegfilgrastim with future cycles of chemotherapy.    Chemotherapy-induced neutropenia  - see above    Metastatic breast cancer  - patient of Dr. Holliday.  - status post cycle #1 of adriamycin/cyclophosphamide. Cycle #2 is scheduled for 7/26/19.  - she is scheduled to see Dr. Perez in clinic " on 7/23/19 (Dr. Holliday is out of town).  - I will send Dr. Perez a message giving him information about the neutropenic fever.        Thank you for your consult.     Tawanda Minor MD  Hematology/Oncology  [facility]

## 2019-07-21 NOTE — HPI
73 year-old female with recurrent breast cancer on adriamycin/cyclophosphamide q21 chemotherapy (cycle #1 on 7/5/19) was admitted on 7/20/19 for neutropenic fever. Consult is for leukopenia secondary to chemotherapy.    Patient states that she did not receive pegfilgrastim On Pro with her first cycle of chemotherapy. She endorses malaise, nausea. She states she feels better since admission. She denies shortness of breath, chest pain, diarrhea, constipation.

## 2019-07-21 NOTE — ASSESSMENT & PLAN NOTE
Continue scheduled nebs and antibiotics.  Consider the addition of corticosteroids if significant wheezing or shortness of breath develops.

## 2019-07-21 NOTE — ASSESSMENT & PLAN NOTE
07/20/2019---Started chemo effusions earlier this month, now with leukopenia and temperature greater than 101° F. source as yet to be determined but suspect pulmonary; however,  Chest x-ray in the ED was without focal consolidation or opacity.  Blood cultures are pending.  Fever and elevated lactic acid of 3.5 for concerning.  Initiate broad-spectrum IV antibiotics, continue IV fluids.  Trend lactic acid.  Follow cultures.    07/21/2019---patient remains ill appearing hypotensive this morning with fluid bolus for pressure support her lactic acid has improved which is reassuring she is on dual antibiotics for now and has blood cultures pending; chest x-ray UA unrevealing---last temp at 5:00 a.m. this morning of 100  -consult Hematology  -consult to Infectious Disease  -continue dual antibiotics for now and narrow once a sources been detected  -continue IV fluid  -supportive care for fever  -CBC daily CMP daily

## 2019-07-21 NOTE — ASSESSMENT & PLAN NOTE
Started chemo infusions earlier this month, 07/05/2019.  Provide as needed antiemetics and analgesics.

## 2019-07-21 NOTE — PROGRESS NOTES
[facility] Medicine  Progress Note    Patient Name: Ade Castellano  MRN: [id number]  Patient Class: OP- Observation   Admission Date: 7/20/2019  Length of Stay: 0 days  Attending Physician: Bharti Farah MD  Primary Care Provider: Jeannine Huitron MD        Subjective:     Principal Problem:Neutropenic fever      HPI:  73 y.o. female with COPD, CAD, hypertension, hyperlipidemia, SUNITHA, arthritis, peptic ulcer disease, squamous cell carcinoma of the lung, and ductal carcinoma of the breast presents with a complaint of worsening fatigue over the past 4 days.  Last chemo was 7/5/2019.  She has been overdoing it little performing yd work and he and not drinking much fluids.  She reports her shortness of breath and cough is worse than baseline.  She has home oxygen, but does not routinely wear it.  Denies fever, chills, chest pain, palpitations, orthopnea, PND, dizziness, syncope, nausea, vomiting, diarrhea, abdominal pain, bloody or black stools, dysuria, frequency, or urgency.  In the ED she was given IV fluids and nebulizer treatment with improvement.  Routine labs, chest x-ray, EKG, CT head, and urinalysis were unremarkable for any acute abnormality other than an elevated lactic acid of 2.9.  This was repeated after IV fluid administration and resulted as 3.5.  placed in observation to trend her lactic acid.  She was afebrile while in the ED, but upon arrival to the floor she spiked a temperature of 101° F. repeat blood cultures and broad-spectrum antibiotics initiated.    Overview/Hospital Course:  This is a very pleasant 73-year-old female who was placed in observation for neutropenic fever.  Patient is a cancer patient and receives chemo every 3 weeks last dose was July 2, 2019.  She tells me that she became weak and fatigued about 2 days ago she was found to have fever of 102.2 of unclear etiology at the time of presentation.  Her blood cultures are pending she is on dual  antibiotics vanc and Zosyn.  She is neutropenic with the absolute neutrophil count of 946.  The patient denies any nausea vomiting it at home, sick contacts however her blood pressure has been running low while in the hospital last 87/44.  She has received multiple boluses in the emergency room and received another bolus this morning 500 which improved her blood pressure from 89/51 to 110/51.  Her Home blood pressure meds are being held secondary to hypotension.  She remained ill appearing, last low-grade temp was 7:00 a.m. this morning at 99.5, at 5:00 a.m. was 100.  Her CT head and chest x-ray are non revealing, and urinalysis unimpressive.  At the time of presentation her lactic acid was 3.5    Given her age, hemodynamic instability, hypotension, cancer, neutropenia, and fever she is a risk versus sudden acute decline.  Will continue to give IV fluids as pressure support for now and monitor closely continue antibiotics and consulted Hematology and Infectious Disease---blood cultures pending supportive care    Past Medical History:   Diagnosis Date    Acute respiratory failure with hypoxia and hypercapnia 11/29/2017    Angina pectoris     Arthritis     Bell's palsy     left facial weakness    Breast cancer     RIGHT    CAD (coronary artery disease)     Cervical cancer     Chronic bronchitis     COPD (chronic obstructive pulmonary disease)     Dr. Katz    Dental bridge present     Emphysema of lung     H/O colonoscopy 06/2017    due for repeat colonsocopy in 6/2018    History of heart artery stent     Dr. Ortiz  x2 stents    Hyperlipidemia     Hypertension     Lung cancer     Myocardial infarction     SUNITHA (obstructive sleep apnea)     intolerant to mask    Pneumonia     Pneumonia due to other staphylococcus     PUD (peptic ulcer disease)     Severe anemia 6/11/2018    Sleep apnea     Vaginal delivery     x1       Past Surgical History:   Procedure Laterality Date    ADENOIDECTOMY       [id number] N/A 2/14/2018    Performed [facility]    BIOPSY-SENTINEL NODE (74488) Right 7/11/2014    Performed [facility]berry [facility] at [facility] OR    BIOPSY-ULTRASOUND GUIDED CORE Right 6/17/2014    [facility]    BREAST BIOPSY Right     x3    BREAST LUMPECTOMY      BREAST SURGERY      lumpectomy right side     CERVIX SURGERY      cone    COLONOSCOPY N/A 1/29/2019    Performed by Emmanuel Perez [facility] at [facility] ENDO    COLONOSCOPY N/A 11/28/2018    Performed by [facility]ura Urbina [facility] at [facility] ENDO    COLONOSCOPY N/A 6/30/2017    Performed by Julio Rudd [facility] at [facility] ENDO    COLONOSCOPY N/A 3/17/2017    Performed by [facility] ENDO    ESOPHAGOGASTRODUODENOSCOPY (EGD) N/A 10/11/2017    Performed by [facility] ENDO    EYE SURGERY Bilateral 06/08/2018    cataract     [id number]-P-CATH N/A 3/7/2018    [facility]formed by [facility]    LUMPECTOMY RIGHT BREAST S/P NEEDLE LOCALIZATION  Right 7/11/2014    Per[facility] OR    PORTACATH PLACEMENT Right 01/2018    sweat glands axillary regions Bilateral     TONSILLECTOMY         Review of patient's allergies indicates:  No Known Allergies    No current facility-administered medications on file prior to encounter.      Current Outpatient Medications on File Prior to Encounter   Medication Sig    albuterol (PROVENTIL) 2.5 mg /3 mL (0.083 %) nebulizer solution NEBULIZE 1 vial EVERY 6 HOURS AS NEEDED FOR WHEEZING    albuterol (VENTOLIN HFA) 90 mcg/actuation inhaler INHALE 2 PUFF(S) BY MOUTH EVERY 4 - 6 HOURS AS NEEDED FOR SHORTNESS OF BREATH or WHEEZING    aspirin (ECOTRIN) 325 MG EC tablet Take 325 mg by mouth once daily.    fluticasone (FLONASE) 50 mcg/actuation nasal spray USE TWO SPRAYS IN EACH NOSTRIL ONCE A DAY    isosorbide mononitrate (IMDUR) 60 MG 24 hr tablet Take 1 tablet (60 mg total) by mouth once daily.     metoprolol tartrate (LOPRESSOR) 25 MG tablet Take 1 tablet (25 mg total) by mouth 2 (two) times daily.    montelukast (SINGULAIR) 10 mg tablet Take 10 mg by mouth nightly. at bedtime.    ondansetron (ZOFRAN) 8 MG tablet Take 1 tablet (8 mg total) by mouth every 12 (twelve) hours as needed for Nausea.    oxyCODONE-acetaminophen (PERCOCET) 5-325 mg per tablet Take 1 tablet by mouth every 12 (twelve) hours as needed for Pain.    rosuvastatin (CRESTOR) 20 MG tablet take 1/2 TABLET(S) BY MOUTH ONCE A DAY    TRELEGY ELLIPTA 100-62.5-25 mcg DsDv INHALE ONE PUFF INTO THE LUNGS ONCE A DAY    NITROSTAT 0.4 mg SL tablet place 1 tablet under the tongue AS NEEDED no more than 3 in 15 minutes     Family History     Problem Relation (Age of Onset)    Breast cancer Mother    Cancer Mother, Father, Sister, Maternal Grandmother, Maternal Grandfather, Paternal Grandmother, Paternal Grandfather, Sister    Heart attack Sister    Heart disease Mother, Maternal Grandmother, Maternal Grandfather    No Known Problems Sister        Tobacco Use    Smoking status: Former Smoker     Packs/day: 0.25     Years: 50.00     Pack years: 12.50     Types: Cigarettes     Last attempt to quit: 2017     Years since quittin.6    Smokeless tobacco: Never Used   Substance and Sexual Activity    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   Eyes: Negative for photophobia and visual disturbance.   Respiratory: Positive for cough and shortness of breath.    Cardiovascular: Negative for chest pain, palpitations and leg swelling.   Gastrointestinal: Negative for abdominal pain, diarrhea, nausea and vomiting.   Genitourinary: Negative for dysuria, frequency and urgency.   Musculoskeletal: Positive for myalgias.   Skin: Negative for pallor, rash and wound.   Neurological: Positive for weakness.  Negative for light-headedness and headaches.   Psychiatric/Behavioral: Negative for confusion and decreased concentration.     Objective:     Vital Signs (Most Recent):  Temp: 98.5 °F (36.9 °C) (07/21/19 1130)  Pulse: 70 (07/21/19 1210)  Resp: 18 (07/21/19 1130)  BP: (!) 110/51 (07/21/19 1223)  SpO2: 100 % (07/21/19 1130) Vital Signs (24h Range):  Temp:  [98.5 °F (36.9 °C)-101.2 °F (38.4 °C)] 98.5 °F (36.9 °C)  Pulse:  [65-95] 70  Resp:  [17-19] 18  SpO2:  [95 %-100 %] 100 %  BP: ()/(44-81) 110/51     Weight: 82.2 kg (181 lb 3.5 oz)  Body mass index is 33.15 kg/m².    Physical Exam   Constitutional: She is oriented to person, place, and time. She appears well-developed and well-nourished. She appears ill. No distress.   HENT:   Head: Normocephalic and atraumatic.   Right Ear: External ear normal.   Left Ear: External ear normal.   Nose: Nose normal.   Mouth/Throat: Oropharynx is clear and moist.   Eyes: Pupils are equal, round, and reactive to light. Conjunctivae and EOM are normal.   Neck: Normal range of motion. Neck supple.   Cardiovascular: Normal rate, regular rhythm and intact distal pulses.   Pulmonary/Chest: No respiratory distress. She has wheezes. She has rhonchi.   Abdominal: Soft. Bowel sounds are normal. She exhibits no distension. There is no tenderness.   No palpable hepatomegaly or splenomegaly    Musculoskeletal: Normal range of motion. She exhibits no edema or tenderness.   Neurological: She is alert and oriented to person, place, and time.   Skin: Skin is warm and dry.   Psychiatric: She has a normal mood and affect. Thought content normal.   Nursing note and vitals reviewed.        CRANIAL NERVES     CN III, IV, VI   Pupils are equal, round, and reactive to light.  Extraocular motions are normal.        Significant Labs: All pertinent labs within the past 24 hours have been reviewed.    Significant Imaging: I have reviewed all pertinent imaging results/findings within the past 24  hours.      Assessment/Plan:      * Neutropenic fever  07/20/2019---Started chemo effusions earlier this month, now with leukopenia and temperature greater than 101° F. source as yet to be determined but suspect pulmonary; however,  Chest x-ray in the ED was without focal consolidation or opacity.  Blood cultures are pending.  Fever and elevated lactic acid of 3.5 for concerning.  Initiate broad-spectrum IV antibiotics, continue IV fluids.  Trend lactic acid.  Follow cultures.    07/21/2019---patient remains ill appearing hypotensive this morning with fluid bolus for pressure support her lactic acid has improved which is reassuring she is on dual antibiotics for now and has blood cultures pending; chest x-ray UA unrevealing---last temp at 5:00 a.m. this morning of 100  -consult Hematology  -consult to Infectious Disease  -continue dual antibiotics for now and narrow once a sources been detected  -continue IV fluid  -supportive care for fever  -CBC daily CMP daily    Neutropenia associated with infection  Unclear etiology at this time.  Patient denies recent illnesses or sick contacts.  She reports feeling in her usual state health until 2 days ago.  Monitor closely and consult ID while awaiting blood cultures      SUNITHA (obstructive sleep apnea)  Declined CPAP--2 L oxygen supplementation    DNR (do not resuscitate)  Noted    Benign hypertensive heart disease without heart failure  07/20/2019---Well controlled, continue home medications and monitor blood pressure, adjust as needed.    07/21/2019---hold all home blood pressure medicines secondary to hypotension monitor blood pressure closely and continue IV fluids for pressure support    Squamous cell carcinoma of right lung  Started chemo infusions earlier this month, 07/05/2019    Stable angina  07/20/2019---No acute issue, continue Imdur    07/21/2019---no complaint of chest pain at this time has been hypotensive, will continue M2 or starting tomorrow hold today and  start tomorrow at a lower dose secondary to hypotension monitor closely    Chronic obstructive pulmonary disease with acute lower respiratory infection  Continue scheduled nebs and antibiotics.  Consider the addition of corticosteroids if significant wheezing or shortness of breath develops.    Metastatic breast cancer  Started chemo infusions earlier this month, 07/05/2019.  Provide as needed antiemetics and analgesics.    Hyperlipidemia LDL goal <70  Continue statin    CAD (coronary artery disease)  No acute issue, continue home regimen.    VTE Risk Mitigation (From admission, onward)        Ordered     enoxaparin injection 40 mg  Daily      07/20/19 1825     IP VTE HIGH RISK PATIENT  Once      07/20/19 1801                 FILOMENA Johansen, FNP-C  Hospitalist - Department of Hospital Medicine  [address], [place] [address]  Office [phone]; Pager [phone]

## 2019-07-21 NOTE — PROGRESS NOTES
Pharmacokinetic Initial Assessment: IV Vancomycin    Assessment/Plan:    Initiate intravenous vancomycin with loading dose of 2250 mg once followed by a maintenance dose of vancomycin 1750mg IV every 24 hours  Desired empiric serum trough concentration is 10 to 20 mcg/mL.  Draw vancomycin trough level 30 min prior to third dose on 7/22/19 at approximately 2045   Pharmacy will continue to follow and monitor vancomycin.      Please contact pharmacy at extension [phone] with any questions regarding this assessment.     Thank you for the consult,   Haylee Marlow     Patient brief summary:  Ade Castellano is a 73 y.o. female initiated on antimicrobial therapy with IV Vancomycin for treatment of suspected neutropenic fever    Drug Allergies:   Review of patient's allergies indicates:  No Known Allergies    Actual Body Weight:   83.2 kg    Renal Function:   Estimated Creatinine Clearance: 56.5 mL/min (based on SCr of 0.9 mg/dL).,     Dialysis Method (if applicable):  none     CBC (last 72 hours):  Recent Labs   Lab Result Units 07/20/19  1249   WBC K/uL 2.02*   Hemoglobin g/dL 12.8   Hematocrit % 37.9   Platelets K/uL 268   Gran% % 19.8*   Lymph% % 45.0   Mono% % 32.7*   Eosinophil% % 2.0   Basophil% % 0.5   Differential Method  Automated       Metabolic Panel (last 72 hours):  Recent Labs   Lab Result Units 07/20/19  1249 07/20/19  1337   Sodium mmol/L 137  --    Potassium mmol/L 4.2  --    Chloride mmol/L 99  --    CO2 mmol/L 26  --    Glucose mg/dL 131*  --    Glucose, UA   --  Negative   BUN, Bld mg/dL 12  --    Creatinine mg/dL 0.9  --    Albumin g/dL 4.2  --    Total Bilirubin mg/dL 0.5  --    Alkaline Phosphatase U/L 48*  --    AST U/L 37  --    ALT U/L 36  --    Magnesium mg/dL 1.8  --        Drug levels (last 3 results):  No results for input(s): VANCOMYCINRA, VANCOMYCINPE, VANCOMYCINTR in the last 72 hours.    Microbiologic Results:  Microbiology Results (last 7 days)       Procedure Component Value Units  Date/Time    Blood culture [[id number]]     Order Status:  Sent Specimen:  Blood     Blood culture [[id number]]     Order Status:  Sent Specimen:  Blood     Blood culture #2 **CANNOT BE ORDERED STAT** [[id number]] Collected:  07/20/19 1335    Order Status:  Sent Specimen:  Blood from Peripheral, Hand, Left Updated:  07/20/19 1344    Blood culture #1 **CANNOT BE ORDERED STAT** [[id number]] Collected:  07/20/19 1325    Order Status:  Sent Specimen:  Blood from Peripheral, Forearm, Left Updated:  07/20/19 1340

## 2019-07-22 LAB
BASOPHILS # BLD AUTO: 0.02 K/UL (ref 0–0.2)
BASOPHILS NFR BLD: 0.4 % (ref 0–1.9)
DIFFERENTIAL METHOD: ABNORMAL
EOSINOPHIL # BLD AUTO: 0.1 K/UL (ref 0–0.5)
EOSINOPHIL NFR BLD: 1 % (ref 0–8)
ERYTHROCYTE [DISTWIDTH] IN BLOOD BY AUTOMATED COUNT: 14.5 % (ref 11.5–14.5)
HCT VFR BLD AUTO: 31.9 % (ref 37–48.5)
HGB BLD-MCNC: 10.2 G/DL (ref 12–16)
LYMPHOCYTES # BLD AUTO: 0.6 K/UL (ref 1–4.8)
LYMPHOCYTES NFR BLD: 11.9 % (ref 18–48)
MCH RBC QN AUTO: 29.7 PG (ref 27–31)
MCHC RBC AUTO-ENTMCNC: 32 G/DL (ref 32–36)
MCV RBC AUTO: 93 FL (ref 82–98)
MONOCYTES # BLD AUTO: 0.8 K/UL (ref 0.3–1)
MONOCYTES NFR BLD: 16.1 % (ref 4–15)
NEUTROPHILS # BLD AUTO: 3.6 K/UL (ref 1.8–7.7)
NEUTROPHILS NFR BLD: 73.8 % (ref 38–73)
PLATELET # BLD AUTO: 231 K/UL (ref 150–350)
PMV BLD AUTO: 9.5 FL (ref 9.2–12.9)
RBC # BLD AUTO: 3.44 M/UL (ref 4–5.4)
VANCOMYCIN TROUGH SERPL-MCNC: 7 UG/ML (ref 10–22)
WBC # BLD AUTO: 5.04 K/UL (ref 3.9–12.7)

## 2019-07-22 PROCEDURE — 25000003 PHARM REV CODE 250: Mod: HCNC | Performed by: HOSPITALIST

## 2019-07-22 PROCEDURE — 25000242 PHARM REV CODE 250 ALT 637 W/ HCPCS: Mod: HCNC | Performed by: HOSPITALIST

## 2019-07-22 PROCEDURE — 94761 N-INVAS EAR/PLS OXIMETRY MLT: CPT | Mod: HCNC

## 2019-07-22 PROCEDURE — 21400001 HC TELEMETRY ROOM: Mod: HCNC

## 2019-07-22 PROCEDURE — 99223 PR INITIAL HOSPITAL CARE,LEVL III: ICD-10-PCS | Mod: HCNC,,, | Performed by: INTERNAL MEDICINE

## 2019-07-22 PROCEDURE — 63600175 PHARM REV CODE 636 W HCPCS: Mod: HCNC | Performed by: HOSPITALIST

## 2019-07-22 PROCEDURE — 27000221 HC OXYGEN, UP TO 24 HOURS: Mod: HCNC

## 2019-07-22 PROCEDURE — 85025 COMPLETE CBC W/AUTO DIFF WBC: CPT | Mod: HCNC

## 2019-07-22 PROCEDURE — 36415 COLL VENOUS BLD VENIPUNCTURE: CPT | Mod: HCNC

## 2019-07-22 PROCEDURE — 25000003 PHARM REV CODE 250: Mod: HCNC | Performed by: NURSE PRACTITIONER

## 2019-07-22 PROCEDURE — 97165 OT EVAL LOW COMPLEX 30 MIN: CPT | Mod: HCNC

## 2019-07-22 PROCEDURE — 97535 SELF CARE MNGMENT TRAINING: CPT | Mod: HCNC

## 2019-07-22 PROCEDURE — 99223 1ST HOSP IP/OBS HIGH 75: CPT | Mod: HCNC,,, | Performed by: INTERNAL MEDICINE

## 2019-07-22 PROCEDURE — 94640 AIRWAY INHALATION TREATMENT: CPT | Mod: HCNC

## 2019-07-22 PROCEDURE — 97161 PT EVAL LOW COMPLEX 20 MIN: CPT | Mod: HCNC | Performed by: PHYSICAL THERAPIST

## 2019-07-22 PROCEDURE — 80202 ASSAY OF VANCOMYCIN: CPT | Mod: HCNC

## 2019-07-22 RX ADMIN — IPRATROPIUM BROMIDE AND ALBUTEROL SULFATE 3 ML: .5; 3 SOLUTION RESPIRATORY (INHALATION) at 07:07

## 2019-07-22 RX ADMIN — ACETAMINOPHEN 650 MG: 325 TABLET, FILM COATED ORAL at 01:07

## 2019-07-22 RX ADMIN — PIPERACILLIN, TAZOBACTAM 4.5 G: 4; .5 INJECTION, POWDER, LYOPHILIZED, FOR SOLUTION INTRAVENOUS at 12:07

## 2019-07-22 RX ADMIN — ASPIRIN 81 MG: 81 TABLET, COATED ORAL at 09:07

## 2019-07-22 RX ADMIN — IPRATROPIUM BROMIDE AND ALBUTEROL SULFATE 3 ML: .5; 3 SOLUTION RESPIRATORY (INHALATION) at 11:07

## 2019-07-22 RX ADMIN — ENOXAPARIN SODIUM 40 MG: 100 INJECTION SUBCUTANEOUS at 05:07

## 2019-07-22 RX ADMIN — POLYETHYLENE GLYCOL 3350 17 G: 17 POWDER, FOR SOLUTION ORAL at 09:07

## 2019-07-22 RX ADMIN — PIPERACILLIN, TAZOBACTAM 4.5 G: 4; .5 INJECTION, POWDER, LYOPHILIZED, FOR SOLUTION INTRAVENOUS at 04:07

## 2019-07-22 RX ADMIN — IPRATROPIUM BROMIDE AND ALBUTEROL SULFATE 3 ML: .5; 3 SOLUTION RESPIRATORY (INHALATION) at 03:07

## 2019-07-22 RX ADMIN — ISOSORBIDE MONONITRATE 30 MG: 30 TABLET, EXTENDED RELEASE ORAL at 09:07

## 2019-07-22 RX ADMIN — ACETAMINOPHEN 650 MG: 325 TABLET, FILM COATED ORAL at 05:07

## 2019-07-22 RX ADMIN — BISACODYL 10 MG: 10 SUPPOSITORY RECTAL at 01:07

## 2019-07-22 RX ADMIN — ROSUVASTATIN CALCIUM 20 MG: 10 TABLET, COATED ORAL at 09:07

## 2019-07-22 RX ADMIN — MONTELUKAST 10 MG: 10 TABLET, FILM COATED ORAL at 08:07

## 2019-07-22 RX ADMIN — PIPERACILLIN, TAZOBACTAM 4.5 G: 4; .5 INJECTION, POWDER, LYOPHILIZED, FOR SOLUTION INTRAVENOUS at 08:07

## 2019-07-22 RX ADMIN — ACETAMINOPHEN 650 MG: 325 TABLET, FILM COATED ORAL at 06:07

## 2019-07-22 RX ADMIN — IPRATROPIUM BROMIDE AND ALBUTEROL SULFATE 3 ML: .5; 3 SOLUTION RESPIRATORY (INHALATION) at 08:07

## 2019-07-22 NOTE — NURSING
Bedside report received from DURAN Moon. Patient in bed awake and alert, AAOx4.No apprent distress noted. She is on oxygen at 2 L via nasal cannula. Safety and fall precautions maintained. Will continue to monitor.

## 2019-07-22 NOTE — CONSULTS
[facility]  Infectious Disease  Consult Note    Patient Name: Ade Castellano  MRN: [id number]  Admission Date: 7/20/2019  Hospital Length of Stay: 1 days  Attending Physician: Neftali Renee MD  Primary Care Provider: Jeannine Huitron MD     Isolation Status: No active isolations    Patient information was obtained from patient and ER records.      Inpatient consult to Infectious Diseases  Consult performed by: Ban Aparicio MD  Consult ordered by: OTIS Johansen        Assessment/Plan:     * Neutropenic fever  Patient on chemotherapy for metastatic breast cancer and was admitted with fever and rigors. Symptoms resolved with vanc/zosyn. UA, CXR, cultures unrevealing. Port non-tender. Unclear etiology of infection. recommend de-escalating to cipro 500 bid and augmentin 875/125 bid to complete an empiric 14 day course of antibiotics (estimated end date 8/2/19). Gram negative prophylaxis antibiotics not usually continued in solid organ malignancies. If fever returns, pt should return to hospital for further work up. Discussed with patient side effects of quinolones (including but not limited to qtc prolongation, hypoglycemia, achilles tendon rupture, worsening of aneurysms, c.diff) and these are acceptable risks and preferable to IV medication and risk of picc line complications. Qtc = 461            Thank you for your consult. I will sign off. Please contact us if you have any additional questions.    aBn Aparicio MD  Infectious Disease  [facility]    Subjective:     Principal Problem: Neutropenic fever    HPI: 73 y.o. female with COPD, CAD, hypertension, hyperlipidemia, SUNITHA, arthritis, peptic ulcer disease, squamous cell carcinoma of the lung, and ductal carcinoma of the breast getting Doxorubicin/ Cyclophosphamide chemotherapy (last done 7/5/19) admitted 7/20 with neutropenic fever. Reports worsening fatigue and dizziness. Attributes it to working too much  doing yardwork. Doesn't recall fever at home, but came to hospital with shaking/chills. On arrival, found to be febrile 101. Was started on vancomycin/zosyn with resolution of fever. Denies other symptoms. Denies port tenderness, erythema or warmth. Denies dysuria. Denies orthopena or edema. Sob at baseline. Denies diarrhea. Got a dose of granix and neutropenia resolved.    bcx NGTD    CXR:   Right-sided chest port with tip projecting over the SVC.  Mediastinal structures are midline.  Hilar contours are unremarkable.  Cardiac silhouette is normal in size.  Lung volumes are symmetric.  No consolidation.  No pneumothorax or pleural effusions.  No free air beneath the diaphragm.  No acute osseous abnormalities.    UA- wnl          Review of Systems   Constitutional: Positive for activity change.   HENT: Negative for congestion.    Respiratory: Negative for chest tightness and shortness of breath.    Cardiovascular: Negative for chest pain.   Gastrointestinal: Negative for abdominal distention.   Genitourinary: Negative for difficulty urinating.     Objective:     Vital Signs (Most Recent):  Temp: 97.7 °F (36.5 °C) (07/22/19 1156)  Pulse: 83 (07/22/19 1156)  Resp: 19 (07/22/19 1156)  BP: 137/76 (07/22/19 1156)  SpO2: 98 % (07/22/19 1156) Vital Signs (24h Range):  Temp:  [97.6 °F (36.4 °C)-98.4 °F (36.9 °C)] 97.7 °F (36.5 °C)  Pulse:  [64-90] 83  Resp:  [18-24] 19  SpO2:  [93 %-100 %] 98 %  BP: (102-143)/(51-76) 137/76     Weight: 84.8 kg (186 lb 15.2 oz)  Body mass index is 34.19 kg/m².    Intake/Output Summary (Last 24 hours) at 7/22/2019 1459  Last data filed at 7/22/2019 0600  Gross per 24 hour   Intake 980 ml   Output 880 ml   Net 100 ml      Physical Exam   Constitutional: She is oriented to person, place, and time. She appears well-developed and well-nourished.   HENT:   Head: Normocephalic and atraumatic.   Eyes: Pupils are equal, round, and reactive to light.   Neck: Normal range of motion. Neck supple.    Cardiovascular: Normal rate and regular rhythm.   No murmur heard.  Pulmonary/Chest: Effort normal and breath sounds normal.   Abdominal: Soft. Bowel sounds are normal. She exhibits no distension. There is no tenderness.   Musculoskeletal: Normal range of motion.   Neurological: She is alert and oriented to person, place, and time.   Skin: Skin is warm and dry.   Port in R chest without tenderness, erythema or warmth   Psychiatric: She has a normal mood and affect. Her behavior is normal.   Nursing note and vitals reviewed.      Significant Labs:   BMP:   Recent Labs   Lab 07/21/19  0420   *      K 3.5      CO2 26   BUN 8   CREATININE 0.7   CALCIUM 8.6*     CBC:   Recent Labs   Lab 07/21/19  0420 07/22/19  0520   WBC 2.22* 5.04   HGB 10.8* 10.2*   HCT 32.7* 31.9*    231       Significant Imaging:

## 2019-07-22 NOTE — NURSING
Patient in the bathroom, trying to have bowel movement.No BM for now, she will try again later she said. Assisted back to bed, mild SOB noted.   Report/update given to DURAN Moon. No further complaints made. Safety maintained. Antibiotic infusing well at right chest port cath.

## 2019-07-22 NOTE — PT/OT/SLP EVAL
Occupational Therapy   Evaluation/Treatment and Discharge Note    Name: Ade Castellano  MRN: [id number]  Admitting Diagnosis:  Neutropenic fever      Recommendations:     Discharge Recommendations: home(with family assist)  Discharge Equipment Recommendations:  none  Barriers to discharge:  None    Assessment:     Ade Castellano is a 73 y.o. female with a medical diagnosis of Neutropenic fever. At this time, patient is functioning at their prior level of function and does not require further acute OT services. The patient states she will be discharging to her niece's house where she will have assistance if needed. The patient verbalized understanding of Energy Conservation techniques and need to avoid excessive outdoor chores.     Plan:     During this hospitalization, patient does not require further acute OT services.  Please re-consult if situation changes.    · Plan of Care Reviewed with: patient    Subjective     Chief Complaint: wants to be able to walk to the bathroom  Patient/Family Comments/goals: remain independent    Occupational Profile:  Living Environment: The patient lives alone in a trailer with 3 BARBARA and left HR. The patient plans to go to her niece's house where there are no concerns.  Previous level of function: (I) with self care and ambulation without use of AE. The patient has oxygen but started using it ~1 week ago s/p chemo (3 L NC)  Roles and Routines: The patient drives and mows her 3 acre property. The patient goes to the [employer] 4-5x per week.  Equipment Used at home:  none  Assistance upon Discharge: Niece and states she will have assistance from her [employer] members    Pain/Comfort:  · Pain Rating 1: 0/10    Patients cultural, spiritual, Yarsani conflicts given the current situation: no    Objective:     Communicated with: nurse prior to session.  Patient found HOB elevated with bed alarm, telemetry, peripheral IV(right chest port) upon OT entry to room.    General  Precautions: Standard, fall   Orthopedic Precautions:N/A   Braces: N/A     Occupational Performance:    Bed Mobility:    · Patient completed Scooting/Bridging with modified independence  · Patient completed Supine to Sit with modified independence    Functional Mobility/Transfers:  · Patient completed Sit <> Stand Transfer with modified independence  with  no assistive device   · Patient completed Toilet Transfer Step Transfer technique with modified independence with  no AD  · Functional Mobility: The patient ambulated without AD from the bed to the sink>toilet> chair (I)ly, without AD. The patient was able to manage the bathroom door to enter/exit the bathroom.    Activities of Daily Living:  · Grooming: modified independence to stand at the sink to brush her teeth and wash her face and hands  · Upper Body Dressing: modified independence    · Lower Body Dressing: modified independence      Cognitive/Visual Perceptual:  Cognitive/Psychosocial Skills:     -       Oriented to: Person, Place, Time and Situation   -       Follows Commands/attention:Follows multistep  commands  -       Communication: clear/fluent  -       Memory: No Deficits noted  -       Safety awareness/insight to disability: intact   -       Mood/Affect/Coping skills/emotional control: Appropriate to situation  Visual/Perceptual:      -Intact      Physical Exam:  Balance: -       good  Postural examination/scapula alignment:    -       No postural abnormalities identified  Skin integrity: Visible skin intact  Edema:  None noted  Sensation:    -       Intact in BUE. The patient reports neuropathy in the bottoms of feet 2* chemo.  Upper Extremity Range of Motion:     -       Right Upper Extremity: WFL  -       Left Upper Extremity: WFL  Upper Extremity Strength:    -       Right Upper Extremity: WFL  -       Left Upper Extremity: WFL   Strength:    -       Right Upper Extremity: WFL  -       Left Upper Extremity: WFL    AMPA 6 Click ADL:  [facility]  Total Score: 24    Treatment & Education:  The patient participated in the OT eval and was educated re: benefits of sitting in the chair as tolerated.The patient was also educated re: Energy Conservation Techniques and was given the Save Your Energy handout. The patient's white board was updated.  Education:    Patient left up in chair with all lines intact, call button in reach and nurse notified    GOALS:   Multidisciplinary Problems     Occupational Therapy Goals     Not on file          Multidisciplinary Problems (Resolved)        Problem: Occupational Therapy Goal    Goal Priority Disciplines Outcome Interventions   Occupational Therapy Goal   (Resolved)     OT, PT/OT Outcome(s) achieved                    History:     Past Medical History:   Diagnosis Date    Acute respiratory failure with hypoxia and hypercapnia 11/29/2017    Angina pectoris     Arthritis     Bell's palsy     left facial weakness    Breast cancer     RIGHT    CAD (coronary artery disease)     Cervical cancer     Chronic bronchitis     COPD (chronic obstructive pulmonary disease)     Dr. Katz    Dental bridge present     Emphysema of lung     H/O colonoscopy 06/2017    due for repeat colonsocopy in 6/2018    History of heart artery stent     Dr. Ortiz  x2 stents    Hyperlipidemia     Hypertension     Lung cancer     Myocardial infarction     SUNITHA (obstructive sleep apnea)     intolerant to mask    Pneumonia     Pneumonia due to other staphylococcus     PUD (peptic ulcer disease)     Severe anemia 6/11/2018    Sleep apnea     Vaginal delivery     x1       Past Surgical History:   Procedure Laterality Date    ADENOIDECTOMY      [id number] N/A 2/14/2018    [facility]    BIOPSY-SENTINEL NODE (17527) Right 7/11/2014    [facility]    BIOPSY-ULTRASOUND GUIDED CORE Right 6/17/2014    [facility]    BREAST BIOPSY  Right     x3    BREAST LUMPECTOMY      BREAST SURGERY      lumpectomy right side     CERVIX SURGERY      cone    COLONOSCOPY N/A 1/29/2019    [facility]    COLONOSCOPY N/A 11/28/2018    [facility] at [facility]    COLONOSCOPY N/A 6/30/2017    [facility] END[facility]    COLONOSCOPY N/A 3/17/2017    [facility]    ESOPHAGOGASTRODUODENOSCOPY (EGD) N/A 10/11/2017    [facility]    EYE SURGERY Bilateral 06/08/2018    cataract     CAFRVBIWQ-PIKZ-W-CATH N/A 3/7/2018    [facility]    LUMPECTOMY RIGHT BREAST S/P NEEDLE LOCALIZATION  Right 7/11/2014    [facility]    PORTACATH PLACEMENT Right 01/2018    sweat glands axillary regions Bilateral     TONSILLECTOMY         Time Tracking:     OT Date of Treatment: 07/22/19  OT Start Time: 1013  OT Stop Time: 1036  OT Total Time (min): 23 min    Billable Minutes:Evaluation 15  Self Care/Home Management 8  Total Time 23    Cyndee Downey OT  7/22/2019

## 2019-07-22 NOTE — PT/OT/SLP EVAL
"Physical Therapy Evaluation    Patient Name:  Ade Castellano   MRN:  [id number]    Recommendations:     Discharge Recommendations:  home, outpatient PT   Discharge Equipment Recommendations: shower chair, other (see comments)(- Rollator. )   Barriers to discharge: Inaccessible home and Decreased caregiver support    Assessment:     Ade Castellano is a 73 y.o. female admitted with a medical diagnosis of Neutropenic fever.  She presents with the following impairments/functional limitations:  impaired endurance, impaired functional mobilty, gait instability, decreased lower extremity function, decreased safety awareness, impaired cardiopulmonary response to activity.    Rehab Prognosis: Good; patient would benefit from acute skilled PT services to address these deficits and reach maximum level of function.    Recent Surgery: * No surgery found *      Plan:     During this hospitalization, patient to be seen 3 x/week to address the identified rehab impairments via gait training, therapeutic activities, therapeutic exercises and progress toward the following goals:    · Plan of Care Expires:  08/04/19    Subjective     Chief Complaint: I get a little short of breath ... sometimes.   Patient/Family Comments/goals: to return to PLOF - to participate with Daily Activities at the "[facility]"  Pain/Comfort:  · Pain Rating 1: 0/10    Patients cultural, spiritual, Jainism conflicts given the current situation:      Living Environment:  Pt lives alone in a mobile home with 3 BARBARA without any hand rails.  Pt reports that niece with come and stay with patient for a limited amount of time to provide assistance while needed.   Prior to admission, patients level of function was Independent.  Equipment used at home: oxygen, nebulizer.  DME owned (not currently used): none.  Upon discharge, patient will have assistance from Niece and self.    Objective:     Communicated with Nurse prior to session.  Patient found up in chair with bed " alarm, telemetry, peripheral IV  upon PT entry to room.    General Precautions: Standard, fall   Orthopedic Precautions:Full weight bearing   Braces: N/A     Exams:  · Cognitive Exam:  Patient is oriented to Person, Place and Situation  · Gross Motor Coordination:  WFL  · Postural Exam:  Patient presented with the following abnormalities:    · -       Rounded shoulders  · -       Forward head  · Skin Integrity/Edema:      · -       Skin integrity: Visible skin intact  · -       Edema: None noted .  · RLE ROM: WFL  · RLE Strength: WFL  · LLE ROM: WFL  · LLE Strength: WFL    Functional Mobility:  · Bed Mobility:     · Supine to Sit: contact guard assistance  · Sit to Supine: contact guard assistance  · Transfers:     · Sit to Stand:  minimum assistance with rolling walker  · Gait: 100' with IV pole and Min A for directing around obstacles.  Pt reports that she not walking with any O2.  Pt with decrease abhay with weight bearing task.     AM-PAC 6 CLICK MOBILITY  Total Score:20     Patient left up in chair with all lines intact and call button in reach.    GOALS:   Multidisciplinary Problems     Physical Therapy Goals        Problem: Physical Therapy Goal    Goal Priority Disciplines Outcome Goal Variances Interventions   Physical Therapy Goal     PT, PT/OT Ongoing (interventions implemented as appropriate)     Description:  Goals to be met by: *2019     Patient will increase functional independence with mobility by performin. Supine to sit with Modified [place]  2. Sit to supine with Modified [place]  3. Sit to stand transfer with [place]  4. Gait  x 350 feet with [place] without any supplemental O2.     Recommend: Rollator, Shower Chair, and Outpatient PT at time of discharge.                       History:     Past Medical History:   Diagnosis Date    Acute respiratory failure with hypoxia and hypercapnia 2017    Angina pectoris     Arthritis     Bell's palsy     left  facial weakness    Breast cancer     RIGHT    CAD (coronary artery disease)     Cervical cancer     Chronic bronchitis     COPD (chronic obstructive pulmonary disease)     Dr. Katz    Dental bridge present     Emphysema of lung     H/O colonoscopy 06/2017    due for repeat colonsocopy in 6/2018    History of heart artery stent     Dr. Ortiz  x2 stents    Hyperlipidemia     Hypertension     Lung cancer     Myocardial infarction     SUNITHA (obstructive sleep apnea)     intolerant to mask    Pneumonia     Pneumonia due to other staphylococcus     PUD (peptic ulcer disease)     Severe anemia 6/11/2018    Sleep apnea     Vaginal delivery     x1       Past Surgical History:   Procedure Laterality Date    ADENOIDECTOMY      IPOIHF-[id number] N/A 2/14/2018    [facility] by [facility]    BIOPSY-SENTINEL NODE (94841) Right 7/11/2014    [facility]    BIOPSY-ULTRASOUND GUIDED CORE Right 6/17/2014    [facility] by [facility]    BREAST BIOPSY Right     x3    BREAST LUMPECTOMY      BREAST SURGERY      lumpectomy right side     CERVIX SURGERY      cone    COLONOSCOPY N/A 1/29/2019    [facility]formed by [facility] ENDO    COLONOSCOPY N/A 11/28/2018    [facility] at [facility] ENDO    COLONOSCOPY N/A 6/30/2017    [facility] END[facility]    COLONOSCOPY N/A 3/17/2017    [facility]    ESOPHAGOGASTRODUODENOSCOPY (EGD) N/A 10/11/2017    [facility]    EYE SURGERY Bilateral 06/08/2018    cataract     [id number]-S-CATH N/A 3/7/2018    Performed by [facility] OR    LUMPECTOMY RIGHT BREAST S/P NEEDLE LOCALIZATION  Right 7/11/2014    [facility] at [facility] OR    PORTACATH PLACEMENT Right 01/2018    sweat glands axillary regions Bilateral     TONSILLECTOMY         Time Tracking:      PT Received On: 07/22/19  PT Start Time: 1115     PT Stop Time: 1145  PT Total Time (min): 30 min     Billable Minutes: Evaluation 30 min      Abebe Vincent, PT  07/22/2019

## 2019-07-22 NOTE — PLAN OF CARE
Problem: Physical Therapy Goal  Goal: Physical Therapy Goal  Goals to be met by: *2019     Patient will increase functional independence with mobility by performin. Supine to sit with Modified [place]  2. Sit to supine with Modified [place]  3. Sit to stand transfer with [place]  4. Gait  x 350 feet with [place] without any supplemental O2.     Recommend: Rollator, Shower Chair, and Outpatient PT at time of discharge.     Outcome: Ongoing (interventions implemented as appropriate)  Abebe Vincent, PT  2019

## 2019-07-22 NOTE — PLAN OF CARE
Problem: Fall Injury Risk  Goal: Absence of Fall and Fall-Related Injury  Outcome: Ongoing (interventions implemented as appropriate)  Intervention: Identify and Manage Contributors to Fall Injury Risk     07/22/19 0513   Manage Acute Allergic Reaction   Medication Review/Management medications reviewed   Identify and Manage Contributors to Fall Injury Risk   Self-Care Promotion BADL personal objects within reach;independence encouraged;BADL personal routines maintained     Intervention: Promote Injury-Free Environment     07/22/19 0513   Optimize Ada and Functional Mobility   Environmental Safety Modification assistive device/personal items within reach;clutter free environment maintained;lighting adjusted;room organization consistent   Optimize Balance and Safe Activity   Safety Promotion/Fall Prevention assistive device/personal item within reach;bed alarm set;bedside commode chair;Fall Risk reviewed with patient/family;high risk medications identified;side rails raised x 3;supervised activity;medications reviewed;nonskid shoes/socks when out of bed;instructed to call staff for mobility

## 2019-07-22 NOTE — ASSESSMENT & PLAN NOTE
Patient on chemotherapy for metastatic breast cancer and was admitted with fever and rigors. Symptoms resolved with vanc/zosyn. UA, CXR, cultures unrevealing. Port non-tender. Unclear etiology of infection. recommend de-escalating to cipro 500 bid and augmentin 875/125 bid to complete an empiric 14 day course of antibiotics (estimated end date 8/2/19). Gram negative prophylaxis antibiotics not usually continued in solid organ malignancies. If fever returns, pt should return to hospital for further work up. Discussed with patient side effects of quinolones (including but not limited to qtc prolongation, hypoglycemia, achilles tendon rupture, worsening of aneurysms, c.diff) and these are acceptable risks and preferable to IV medication and risk of picc line complications. Qtc = 461

## 2019-07-22 NOTE — ASSESSMENT & PLAN NOTE
07/20/2019---Started chemo effusions earlier this month, now with leukopenia and temperature greater than 101° F. source as yet to be determined but suspect pulmonary; however,  Chest x-ray in the ED was without focal consolidation or opacity.  Blood cultures are pending.  Fever and elevated lactic acid of 3.5 for concerning.  Initiate broad-spectrum IV antibiotics, continue IV fluids.  Trend lactic acid.  Follow cultures.    07/21/2019---patient remains ill appearing hypotensive this morning with fluid bolus for pressure support her lactic acid has improved which is reassuring she is on dual antibiotics for now and has blood cultures pending; chest x-ray UA unrevealing---last temp at 5:00 a.m. this morning of 100  -consult Hematology  -consult to Infectious Disease  -continue dual antibiotics for now and narrow once a sources been detected  -continue IV fluid  -supportive care for fever  -CBC daily CMP daily    Blood cultures NG. Neutropenia resolved. ID consulted. Follow recs for discharge

## 2019-07-22 NOTE — HPI
73 y.o. female with COPD, CAD, hypertension, hyperlipidemia, SUNITHA, arthritis, peptic ulcer disease, squamous cell carcinoma of the lung, and ductal carcinoma of the breast getting Doxorubicin/ Cyclophosphamide chemotherapy (last done 7/5/19) admitted 7/20 with neutropenic fever. Reports worsening fatigue and dizziness. Attributes it to working too much doing yardwork. Doesn't recall fever at home, but came to hospital with shaking/chills. On arrival, found to be febrile 101. Was started on vancomycin/zosyn with resolution of fever. Denies other symptoms. Denies port tenderness, erythema or warmth. Denies dysuria. Denies orthopena or edema. Sob at baseline. Denies diarrhea. Got a dose of granix and neutropenia resolved.    bcx NGTD    CXR:   Right-sided chest port with tip projecting over the SVC.  Mediastinal structures are midline.  Hilar contours are unremarkable.  Cardiac silhouette is normal in size.  Lung volumes are symmetric.  No consolidation.  No pneumothorax or pleural effusions.  No free air beneath the diaphragm.  No acute osseous abnormalities.    UA- wnl

## 2019-07-22 NOTE — SUBJECTIVE & OBJECTIVE
Interval History: No new issues.     Review of Systems   Constitutional: Positive for activity change.   HENT: Negative for congestion.    Respiratory: Negative for chest tightness and shortness of breath.    Cardiovascular: Negative for chest pain.   Gastrointestinal: Negative for abdominal distention.   Genitourinary: Negative for difficulty urinating.     Objective:     Vital Signs (Most Recent):  Temp: 97.9 °F (36.6 °C) (07/22/19 0718)  Pulse: 68 (07/22/19 0744)  Resp: 18 (07/22/19 0744)  BP: (!) 105/52 (07/22/19 0718)  SpO2: 98 % (07/22/19 0744) Vital Signs (24h Range):  Temp:  [97.6 °F (36.4 °C)-98.5 °F (36.9 °C)] 97.9 °F (36.6 °C)  Pulse:  [64-74] 68  Resp:  [18-24] 18  SpO2:  [93 %-100 %] 98 %  BP: ()/(44-57) 105/52     Weight: 84.8 kg (186 lb 15.2 oz)  Body mass index is 34.19 kg/m².    Intake/Output Summary (Last 24 hours) at 7/22/2019 0758  Last data filed at 7/22/2019 0600  Gross per 24 hour   Intake 980 ml   Output 880 ml   Net 100 ml      Physical Exam   Constitutional: She is oriented to person, place, and time. She appears well-developed and well-nourished.   HENT:   Head: Normocephalic and atraumatic.   Neurological: She is alert and oriented to person, place, and time.   Psychiatric: She has a normal mood and affect. Her behavior is normal.   Nursing note and vitals reviewed.      Significant Labs:   BMP:   Recent Labs   Lab 07/20/19  1249 07/21/19  0420   * 146*    141   K 4.2 3.5   CL 99 105   CO2 26 26   BUN 12 8   CREATININE 0.9 0.7   CALCIUM 9.6 8.6*   MG 1.8  --      CBC:   Recent Labs   Lab 07/20/19  1249 07/21/19  0420 07/22/19  0520   WBC 2.02* 2.22* 5.04   HGB 12.8 10.8* 10.2*   HCT 37.9 32.7* 31.9*    224 231       Significant Imaging:

## 2019-07-22 NOTE — PLAN OF CARE
Problem: Occupational Therapy Goal  Goal: Occupational Therapy Goal  Outcome: Outcome(s) achieved Date Met: 07/22/19  OT eval is complete. The patient is at her functional baseline and no OT is recommended. The patient states she will be discharging to her niece's home where she will have help/supervision after chemo therapy.     Comments: No OT or DME is recommended.

## 2019-07-22 NOTE — SUBJECTIVE & OBJECTIVE
Review of Systems   Constitutional: Positive for activity change.   HENT: Negative for congestion.    Respiratory: Negative for chest tightness and shortness of breath.    Cardiovascular: Negative for chest pain.   Gastrointestinal: Negative for abdominal distention.   Genitourinary: Negative for difficulty urinating.     Objective:     Vital Signs (Most Recent):  Temp: 97.7 °F (36.5 °C) (07/22/19 1156)  Pulse: 83 (07/22/19 1156)  Resp: 19 (07/22/19 1156)  BP: 137/76 (07/22/19 1156)  SpO2: 98 % (07/22/19 1156) Vital Signs (24h Range):  Temp:  [97.6 °F (36.4 °C)-98.4 °F (36.9 °C)] 97.7 °F (36.5 °C)  Pulse:  [64-90] 83  Resp:  [18-24] 19  SpO2:  [93 %-100 %] 98 %  BP: (102-143)/(51-76) 137/76     Weight: 84.8 kg (186 lb 15.2 oz)  Body mass index is 34.19 kg/m².    Intake/Output Summary (Last 24 hours) at 7/22/2019 1459  Last data filed at 7/22/2019 0600  Gross per 24 hour   Intake 980 ml   Output 880 ml   Net 100 ml      Physical Exam   Constitutional: She is oriented to person, place, and time. She appears well-developed and well-nourished.   HENT:   Head: Normocephalic and atraumatic.   Eyes: Pupils are equal, round, and reactive to light.   Neck: Normal range of motion. Neck supple.   Cardiovascular: Normal rate and regular rhythm.   No murmur heard.  Pulmonary/Chest: Effort normal and breath sounds normal.   Abdominal: Soft. Bowel sounds are normal. She exhibits no distension. There is no tenderness.   Musculoskeletal: Normal range of motion.   Neurological: She is alert and oriented to person, place, and time.   Skin: Skin is warm and dry.   Port in R chest without tenderness, erythema or warmth   Psychiatric: She has a normal mood and affect. Her behavior is normal.   Nursing note and vitals reviewed.      Significant Labs:   BMP:   Recent Labs   Lab 07/21/19  0420   *      K 3.5      CO2 26   BUN 8   CREATININE 0.7   CALCIUM 8.6*     CBC:   Recent Labs   Lab 07/21/19  0420 07/22/19  5967    WBC 2.22* 5.04   HGB 10.8* 10.2*   HCT 32.7* 31.9*    231       Significant Imaging:

## 2019-07-22 NOTE — PROGRESS NOTES
[facility] Hospital Medicine  Progress Note    Patient Name: Ade Castellano  MRN: [id number]  Patient Class: IP- Inpatient   Admission Date: 7/20/2019  Length of Stay: 1 days  Attending Physician: Neftali Renee MD  Primary Care Provider: Jeannine Huitron MD        Subjective:     Principal Problem:Neutropenic fever      HPI:  73 y.o. female with COPD, CAD, hypertension, hyperlipidemia, SUNITHA, arthritis, peptic ulcer disease, squamous cell carcinoma of the lung, and ductal carcinoma of the breast presents with a complaint of worsening fatigue over the past 4 days.  Last chemo was 7/5/2019.  She has been overdoing it little performing yd work and he and not drinking much fluids.  She reports her shortness of breath and cough is worse than baseline.  She has home oxygen, but does not routinely wear it.  Denies fever, chills, chest pain, palpitations, orthopnea, PND, dizziness, syncope, nausea, vomiting, diarrhea, abdominal pain, bloody or black stools, dysuria, frequency, or urgency.  In the ED she was given IV fluids and nebulizer treatment with improvement.  Routine labs, chest x-ray, EKG, CT head, and urinalysis were unremarkable for any acute abnormality other than an elevated lactic acid of 2.9.  This was repeated after IV fluid administration and resulted as 3.5.  placed in observation to trend her lactic acid.  She was afebrile while in the ED, but upon arrival to the floor she spiked a temperature of 101° F. repeat blood cultures and broad-spectrum antibiotics initiated.    Overview/Hospital Course:  This is a very pleasant 73-year-old female who was placed in observation for neutropenic fever.  Patient is a cancer patient and receives chemo every 3 weeks last dose was July 2, 2019.  She tells me that she became weak and fatigued about 2 days ago she was found to have fever of 102.2 of unclear etiology at the time of presentation.  Her blood cultures are pending she is on dual  antibiotics vanc and Zosyn.  She is neutropenic with the absolute neutrophil count of 946.  The patient denies any nausea vomiting it at home, sick contacts however her blood pressure has been running low while in the hospital last 87/44.  She has received multiple boluses in the emergency room and received another bolus this morning 500 which improved her blood pressure from 89/51 to 110/51.  Her Home blood pressure meds are being held secondary to hypotension.  She remained ill appearing, last low-grade temp was 7:00 a.m. this morning at 99.5, at 5:00 a.m. was 100.  Her CT head and chest x-ray are non revealing, and urinalysis unimpressive.  At the time of presentation her lactic acid was 3.5    Given her age, hemodynamic instability, hypotension, cancer, neutropenia, and fever she is a risk versus sudden acute decline.  Will continue to give IV fluids as pressure support for now and monitor closely continue antibiotics and consulted Hematology and Infectious Disease---blood cultures pending supportive care. The patient's neutropenia resolved. ID was consulted for any recs for discharge planned on 7/23. PT/OT were consulted.       Interval History: No new issues.     Review of Systems   Constitutional: Positive for activity change.   HENT: Negative for congestion.    Respiratory: Negative for chest tightness and shortness of breath.    Cardiovascular: Negative for chest pain.   Gastrointestinal: Negative for abdominal distention.   Genitourinary: Negative for difficulty urinating.     Objective:     Vital Signs (Most Recent):  Temp: 97.9 °F (36.6 °C) (07/22/19 0718)  Pulse: 68 (07/22/19 0744)  Resp: 18 (07/22/19 0744)  BP: (!) 105/52 (07/22/19 0718)  SpO2: 98 % (07/22/19 0744) Vital Signs (24h Range):  Temp:  [97.6 °F (36.4 °C)-98.5 °F (36.9 °C)] 97.9 °F (36.6 °C)  Pulse:  [64-74] 68  Resp:  [18-24] 18  SpO2:  [93 %-100 %] 98 %  BP: ()/(44-57) 105/52     Weight: 84.8 kg (186 lb 15.2 oz)  Body mass index is  34.19 kg/m².    Intake/Output Summary (Last 24 hours) at 7/22/2019 0758  Last data filed at 7/22/2019 0600  Gross per 24 hour   Intake 980 ml   Output 880 ml   Net 100 ml      Physical Exam   Constitutional: She is oriented to person, place, and time. She appears well-developed and well-nourished.   HENT:   Head: Normocephalic and atraumatic.   Neurological: She is alert and oriented to person, place, and time.   Psychiatric: She has a normal mood and affect. Her behavior is normal.   Nursing note and vitals reviewed.      Significant Labs:   BMP:   Recent Labs   Lab 07/20/19  1249 07/21/19  0420   * 146*    141   K 4.2 3.5   CL 99 105   CO2 26 26   BUN 12 8   CREATININE 0.9 0.7   CALCIUM 9.6 8.6*   MG 1.8  --      CBC:   Recent Labs   Lab 07/20/19  1249 07/21/19  0420 07/22/19  0520   WBC 2.02* 2.22* 5.04   HGB 12.8 10.8* 10.2*   HCT 37.9 32.7* 31.9*    224 231       Significant Imaging:      Assessment/Plan:      * Neutropenic fever  07/20/2019---Started chemo effusions earlier this month, now with leukopenia and temperature greater than 101° F. source as yet to be determined but suspect pulmonary; however,  Chest x-ray in the ED was without focal consolidation or opacity.  Blood cultures are pending.  Fever and elevated lactic acid of 3.5 for concerning.  Initiate broad-spectrum IV antibiotics, continue IV fluids.  Trend lactic acid.  Follow cultures.    07/21/2019---patient remains ill appearing hypotensive this morning with fluid bolus for pressure support her lactic acid has improved which is reassuring she is on dual antibiotics for now and has blood cultures pending; chest x-ray UA unrevealing---last temp at 5:00 a.m. this morning of 100  -consult Hematology  -consult to Infectious Disease  -continue dual antibiotics for now and narrow once a sources been detected  -continue IV fluid  -supportive care for fever  -CBC daily CMP daily    Blood cultures NG. Neutropenia resolved. ID  consulted. Follow recs for discharge     Neutropenia associated with infection  Unclear etiology at this time.  Patient denies recent illnesses or sick contacts.  She reports feeling in her usual state health until 2 days ago.  Monitor closely and consult ID while awaiting blood cultures      Chronic obstructive pulmonary disease with acute lower respiratory infection  Continue scheduled nebs and antibiotics.  Consider the addition of corticosteroids if significant wheezing or shortness of breath develops.    Metastatic breast cancer  Started chemo infusions earlier this month, 07/05/2019.  Provide as needed antiemetics and analgesics.    Squamous cell carcinoma of right lung  Started chemo infusions earlier this month, 07/05/2019    DNR (do not resuscitate)  Noted    Stable angina  07/20/2019---No acute issue, continue Imdur    07/21/2019---no complaint of chest pain at this time has been hypotensive, will continue M2 or starting tomorrow hold today and start tomorrow at a lower dose secondary to hypotension monitor closely    Hyperlipidemia LDL goal <70  Continue statin    SUNITHA (obstructive sleep apnea)  Declined CPAP--2 L oxygen supplementation    CAD (coronary artery disease)  No acute issue, continue home regimen.    Benign hypertensive heart disease without heart failure  07/20/2019---Well controlled, continue home medications and monitor blood pressure, adjust as needed.    07/21/2019---hold all home blood pressure medicines secondary to hypotension monitor blood pressure closely and continue IV fluids for pressure support    obesity Body mass index is 34.19 kg/m².  Weight loss as out patient       VTE Risk Mitigation (From admission, onward)        Ordered     enoxaparin injection 40 mg  Daily      07/20/19 1825     IP VTE HIGH RISK PATIENT  Once      07/20/19 1801          PT/OT eval. ID consult pending. Home on 7/23.       Neftali Rolon MD  Department of Hospital Medicine   [facility]

## 2019-07-22 NOTE — PLAN OF CARE
Problem: Pain Acute  Goal: Optimal Pain Control  Outcome: Ongoing (interventions implemented as appropriate)  Intervention: Develop Pain Management Plan     07/22/19 0514   Prevent or Manage Pain   Pain Management Interventions biofeedback utilized;breathing exercises;care clustered;pain management plan reviewed with patient/caregiver;pillow support provided;quiet environment facilitated;relaxation techniques promoted     Intervention: Prevent or Manage Pain     07/22/19 0514   Prevent or Manage Pain   Sleep/Rest Enhancement consistent schedule promoted;relaxation techniques promoted;regular sleep/rest pattern promoted;awakenings minimized

## 2019-07-23 VITALS
TEMPERATURE: 98 F | DIASTOLIC BLOOD PRESSURE: 58 MMHG | OXYGEN SATURATION: 96 % | HEART RATE: 76 BPM | RESPIRATION RATE: 18 BRPM | SYSTOLIC BLOOD PRESSURE: 126 MMHG | HEIGHT: 62 IN | BODY MASS INDEX: 35.09 KG/M2 | WEIGHT: 190.69 LBS

## 2019-07-23 PROBLEM — R50.81 NEUTROPENIC FEVER: Status: RESOLVED | Noted: 2019-07-20 | Resolved: 2019-07-23

## 2019-07-23 PROBLEM — T45.1X5A CHEMOTHERAPY-INDUCED NEUTROPENIA: Status: RESOLVED | Noted: 2019-07-21 | Resolved: 2019-07-23

## 2019-07-23 PROBLEM — D70.1 CHEMOTHERAPY-INDUCED NEUTROPENIA: Status: RESOLVED | Noted: 2019-07-21 | Resolved: 2019-07-23

## 2019-07-23 PROBLEM — D70.9 NEUTROPENIC FEVER: Status: RESOLVED | Noted: 2019-07-20 | Resolved: 2019-07-23

## 2019-07-23 PROBLEM — D70.3 NEUTROPENIA ASSOCIATED WITH INFECTION: Status: RESOLVED | Noted: 2019-07-21 | Resolved: 2019-07-23

## 2019-07-23 LAB
BACTERIA UR CULT: NORMAL
BASOPHILS # BLD AUTO: 0.03 K/UL (ref 0–0.2)
BASOPHILS NFR BLD: 0.3 % (ref 0–1.9)
DIFFERENTIAL METHOD: ABNORMAL
EOSINOPHIL # BLD AUTO: 0.1 K/UL (ref 0–0.5)
EOSINOPHIL NFR BLD: 0.5 % (ref 0–8)
ERYTHROCYTE [DISTWIDTH] IN BLOOD BY AUTOMATED COUNT: 14.4 % (ref 11.5–14.5)
HCT VFR BLD AUTO: 32.3 % (ref 37–48.5)
HGB BLD-MCNC: 10.3 G/DL (ref 12–16)
LYMPHOCYTES # BLD AUTO: 0.8 K/UL (ref 1–4.8)
LYMPHOCYTES NFR BLD: 8 % (ref 18–48)
MCH RBC QN AUTO: 29.8 PG (ref 27–31)
MCHC RBC AUTO-ENTMCNC: 31.9 G/DL (ref 32–36)
MCV RBC AUTO: 93 FL (ref 82–98)
MONOCYTES # BLD AUTO: 1.1 K/UL (ref 0.3–1)
MONOCYTES NFR BLD: 10.8 % (ref 4–15)
NEUTROPHILS # BLD AUTO: 7.9 K/UL (ref 1.8–7.7)
NEUTROPHILS NFR BLD: 82.5 % (ref 38–73)
PLATELET # BLD AUTO: 299 K/UL (ref 150–350)
PLATELET BLD QL SMEAR: ABNORMAL
PMV BLD AUTO: 9.7 FL (ref 9.2–12.9)
RBC # BLD AUTO: 3.46 M/UL (ref 4–5.4)
WBC # BLD AUTO: 9.8 K/UL (ref 3.9–12.7)

## 2019-07-23 PROCEDURE — 94761 N-INVAS EAR/PLS OXIMETRY MLT: CPT | Mod: HCNC

## 2019-07-23 PROCEDURE — 25000003 PHARM REV CODE 250: Mod: HCNC | Performed by: HOSPITALIST

## 2019-07-23 PROCEDURE — 27000221 HC OXYGEN, UP TO 24 HOURS: Mod: HCNC

## 2019-07-23 PROCEDURE — A4216 STERILE WATER/SALINE, 10 ML: HCPCS | Mod: HCNC | Performed by: EMERGENCY MEDICINE

## 2019-07-23 PROCEDURE — 63600175 PHARM REV CODE 636 W HCPCS: Mod: HCNC | Performed by: HOSPITALIST

## 2019-07-23 PROCEDURE — 63600175 PHARM REV CODE 636 W HCPCS: Mod: HCNC | Performed by: INTERNAL MEDICINE

## 2019-07-23 PROCEDURE — 25000003 PHARM REV CODE 250: Mod: HCNC | Performed by: EMERGENCY MEDICINE

## 2019-07-23 PROCEDURE — 25000003 PHARM REV CODE 250: Mod: HCNC | Performed by: NURSE PRACTITIONER

## 2019-07-23 PROCEDURE — 85025 COMPLETE CBC W/AUTO DIFF WBC: CPT | Mod: HCNC

## 2019-07-23 PROCEDURE — 94640 AIRWAY INHALATION TREATMENT: CPT | Mod: HCNC

## 2019-07-23 PROCEDURE — 97530 THERAPEUTIC ACTIVITIES: CPT | Mod: HCNC

## 2019-07-23 PROCEDURE — 25000242 PHARM REV CODE 250 ALT 637 W/ HCPCS: Mod: HCNC | Performed by: HOSPITALIST

## 2019-07-23 RX ORDER — HEPARIN 100 UNIT/ML
5 SYRINGE INTRAVENOUS ONCE
Status: COMPLETED | OUTPATIENT
Start: 2019-07-23 | End: 2019-07-23

## 2019-07-23 RX ORDER — AMOXICILLIN AND CLAVULANATE POTASSIUM 875; 125 MG/1; MG/1
1 TABLET, FILM COATED ORAL 2 TIMES DAILY
Qty: 28 TABLET | Refills: 0 | Status: SHIPPED | OUTPATIENT
Start: 2019-07-23 | End: 2019-08-06

## 2019-07-23 RX ORDER — CIPROFLOXACIN 500 MG/1
500 TABLET ORAL 2 TIMES DAILY
Qty: 28 TABLET | Refills: 0 | Status: SHIPPED | OUTPATIENT
Start: 2019-07-23 | End: 2019-08-06

## 2019-07-23 RX ADMIN — PIPERACILLIN, TAZOBACTAM 4.5 G: 4; .5 INJECTION, POWDER, LYOPHILIZED, FOR SOLUTION INTRAVENOUS at 05:07

## 2019-07-23 RX ADMIN — Medication 10 ML: at 09:07

## 2019-07-23 RX ADMIN — ROSUVASTATIN CALCIUM 20 MG: 10 TABLET, COATED ORAL at 09:07

## 2019-07-23 RX ADMIN — Medication 500 UNITS: at 04:07

## 2019-07-23 RX ADMIN — ISOSORBIDE MONONITRATE 30 MG: 30 TABLET, EXTENDED RELEASE ORAL at 09:07

## 2019-07-23 RX ADMIN — IPRATROPIUM BROMIDE AND ALBUTEROL SULFATE 3 ML: .5; 3 SOLUTION RESPIRATORY (INHALATION) at 03:07

## 2019-07-23 RX ADMIN — IPRATROPIUM BROMIDE AND ALBUTEROL SULFATE 3 ML: .5; 3 SOLUTION RESPIRATORY (INHALATION) at 11:07

## 2019-07-23 RX ADMIN — POLYETHYLENE GLYCOL 3350 17 G: 17 POWDER, FOR SOLUTION ORAL at 09:07

## 2019-07-23 RX ADMIN — ASPIRIN 81 MG: 81 TABLET, COATED ORAL at 09:07

## 2019-07-23 RX ADMIN — IPRATROPIUM BROMIDE AND ALBUTEROL SULFATE 3 ML: .5; 3 SOLUTION RESPIRATORY (INHALATION) at 07:07

## 2019-07-23 NOTE — PLAN OF CARE
07/23/19 1252   Post-Acute Status   Post-Acute Authorization Placement  (Home)   Post-Acute Placement Status Set-up Complete

## 2019-07-23 NOTE — PT/OT/SLP PROGRESS
Physical Therapy Treatment    Patient Name:  Ade Castellano   MRN:  [id number]    Recommendations:     Discharge Recommendations:  home, outpatient PT   Discharge Equipment Recommendations: shower chair, other (see comments)(- Rollator. )   Barriers to discharge: Inaccessible home and Decreased caregiver support    Assessment:     Ade Castellano is a 73 y.o. female admitted with a medical diagnosis of Neutropenic fever.  She presents with the following impairments/functional limitations:  weakness, gait instability, decreased safety awareness, impaired cardiopulmonary response to activity, decreased lower extremity function, decreased upper extremity function, decreased ROM . Pt declined to perform OOB mobility today 2* pt is ready to go home. BLE HEP given with instruction and demonstration . Pt demonstrated and verbalized understanding.     Rehab Prognosis: Good; patient would benefit from acute skilled PT services to address these deficits and reach maximum level of function.    Recent Surgery: * No surgery found *      Plan:     During this hospitalization, patient to be seen 3 x/week to address the identified rehab impairments via gait training, therapeutic activities, therapeutic exercises and progress toward the following goals:    · Plan of Care Expires:  08/04/19    Subjective     Chief Complaint: none   Patient/Family Comments/goals: to go home  Pain/Comfort:  · Pain Rating 1: 0/10  · Pain Rating Post-Intervention 1: 0/10      Objective:     Communicated with nurse Gomez prior to session.  Patient found HOB elevated with bed alarm, telemetry, oxygen upon PT entry to room.     General Precautions: Standard, fall, respiratory   Orthopedic Precautions:N/A   Braces: N/A     Functional Mobility:  · Bed Mobility:  To get higher in bed    · Scooting: modified independence  · Bridging: modified independence      AM-PAC 6 CLICK MOBILITY  Turning over in bed (including adjusting bedclothes, sheets and blankets)?:  4  Sitting down on and standing up from a chair with arms (e.g., wheelchair, bedside commode, etc.): 4  Moving from lying on back to sitting on the side of the bed?: 4  Moving to and from a bed to a chair (including a wheelchair)?: 4  Need to walk in hospital room?: 3  Climbing 3-5 steps with a railing?: 3  Basic Mobility Total Score: 22       Therapeutic Activities and Exercises:   BLE HEP given with instruction and demonstration . Pt demonstrated and verbalized understanding.     Patient left HOB elevated with all lines intact, call button in reach, bed alarm on and nurse notified..    GOALS:   Multidisciplinary Problems     Physical Therapy Goals        Problem: Physical Therapy Goal    Goal Priority Disciplines Outcome Goal Variances Interventions   Physical Therapy Goal     PT, PT/OT Ongoing (interventions implemented as appropriate)     Description:  Goals to be met by: *2019     Patient will increase functional independence with mobility by performin. Supine to sit with Modified [place]  2. Sit to supine with Modified [place]  3. Sit to stand transfer with [place]  4. Gait  x 350 feet with [place] without any supplemental O2.     Recommend: Rollator, Shower Chair, and Outpatient PT at time of discharge.                       Time Tracking:     PT Received On: 19  PT Start Time: 1512     PT Stop Time: 1521  PT Total Time (min): 9 min     Billable Minutes: Therapeutic Activity 9    Treatment Type: Treatment  PT/PTA: PTA     PTA Visit Number: 1     Vee Poe PTA  2019

## 2019-07-23 NOTE — PLAN OF CARE
Problem: Neutropenia  Goal: Absence of Infection    Intervention: Prevent Infection and Maximize Resistance     07/23/19 0444   Prevent Infection and Maximize Resistance   Infection Prevention rest/sleep promoted         Comments: No spikes in temp during shift. Grouped/clustered care as much as possible to allow pt time to rest.

## 2019-07-23 NOTE — PROGRESS NOTES
HOW TO MANAGE YOUR CARE  AT HOME:  TN taught Symptoms and Problems for HYPOTENSION home care with pt and  with teach back:  1. DIZZINESS, 2.CHEST PAIN, 3. SOB. TN placed education sheet in [place] [employer]..     TN taught patient about things she is responsible for when discharged TO HELP WITH HER RECOVERY:  How to Manage her Care At Home:  1. Getting her prescriptions filled.  2. Taking her medications as directed. DO NOT MISS ANY DOSES!  3. Going to her follow-up doctor appointments.   .  Annamarie Brown RN, BSN, STN CCM

## 2019-07-23 NOTE — PLAN OF CARE
Problem: Physical Therapy Goal  Goal: Physical Therapy Goal  Goals to be met by: *2019     Patient will increase functional independence with mobility by performin. Supine to sit with Modified [place]  2. Sit to supine with Modified [place]  3. Sit to stand transfer with [place]  4. Gait  x 350 feet with [place] without any supplemental O2.     Recommend: Rollator, Shower Chair, and Outpatient PT at time of discharge.      Outcome: Ongoing (interventions implemented as appropriate)  BLE HEP given with instruction and demonstration . Pt demonstrated and verbalized understanding.

## 2019-07-23 NOTE — ASSESSMENT & PLAN NOTE
07/20/2019---Started chemo effusions earlier this month, now with leukopenia and temperature greater than 101° F. source as yet to be determined but suspect pulmonary; however,  Chest x-ray in the ED was without focal consolidation or opacity.  Blood cultures are pending.  Fever and elevated lactic acid of 3.5 for concerning.  Initiate broad-spectrum IV antibiotics, continue IV fluids.  Trend lactic acid.  Follow cultures.    07/21/2019---patient remains ill appearing hypotensive this morning with fluid bolus for pressure support her lactic acid has improved which is reassuring she is on dual antibiotics for now and has blood cultures pending; chest x-ray UA unrevealing---last temp at 5:00 a.m. this morning of 100  -consult Hematology  -consult to Infectious Disease  -continue dual antibiotics for now and narrow once a sources been detected  -continue IV fluid  -supportive care for fever  -CBC daily CMP daily    Blood cultures NG. Neutropenia resolved. ID consulted. Follow recs for discharge   Resolved. Cipro/Augmentin for 14 days per ID.  D/C to home.

## 2019-07-23 NOTE — PROGRESS NOTES
Follow-up Information     Elva Perez MD On 7/25/2019.    Specialties:  Hematology and Oncology, Hematology, Oncology  Why:  Outpatient Services Oncology Follow-Up Thursday at 8:40 AM  Contact information:  [address]  [address]  [phone]               PLEASE BRING TO ALL FOLLOW UP APPOINTMENTS:   1) A COPY YOUR DISCHARGE INSTRUCTIONS   2) ALL MEDICINES YOU ARE CURRENTLY TAKING IN THEIR ORIGINAL BOTTLES   3) IDENTIFICATION CARD   4) INSURANCE CARD    **PLEASE ARRIVE 15 MINUTES AHEAD OF SCHEDULED APPOINTMENT TIME   ++PLEASE CALL 24 HOURS IN ADVANCE IF YOU MUST RESCHEDULE YOUR APPOINTMENT DAY AND/OR TIME     [facility] CARE MANAGEMENT WRITTEN DISCHARGE INFORMATION    APPOINTMENTS AND RESOURCES TO HELP YOU MANAGE YOUR CARE AT HOME BASED ON YOUR PREFERENCES:  (If an appointment is not scheduled for you when you leave the hospital, call your doctor to schedule a follow up visit within a week)        Healthy Living Instructions to HELP MANAGE YOUR CARE AT HOME:  Things You are responsible for:  1.    Getting your prescriptions filled   2.    Taking your medications as directed, DO NOT MISS ANY DOSES!  3.    Following the diet and exercise recommended by your doctor  4.    Going to your follow-up doctor appointment. This is important because it allows the doctor to monitor your progress and determine if any changes need to made to your treatment plan.  5. If you have any questions about MANAGING YOUR CARE AT HOME Call the Nurse Care Line for 24/7 Assistance [phone]       Please answer any calls you may receive from [facility]. We want to continue to support you as you manage your healthcare needs. [facility] is happy to have the opportunity to serve you.      Thank you for choosing [facility] for your healthcare needs!  Your [facility] Case Management Team,    Sofía Owens, RN [place]  Registered Nurse Transition Navigator  [phone]

## 2019-07-23 NOTE — PLAN OF CARE
07/23/19 1252   Final Note   Assessment Type Final Discharge Note   Anticipated Discharge Disposition Home   What phone number can be called within the next 1-3 days to see how you are doing after discharge?   (Listed in chart)   Hospital Follow Up  Appt(s) scheduled? Yes   Discharge plans and expectations educations in teach back method with documentation complete? Yes   Right Care Referral Info   Post Acute Recommendation No Care     TN informed floor nurse, Patricia, care management is complete and can proceed with discharge teaching. Did inform pt's ride will not get to hospital until 4 pm.

## 2019-07-23 NOTE — SUBJECTIVE & OBJECTIVE
Interval History: No new issues.       Review of Systems   Constitutional: Positive for activity change.   HENT: Negative for congestion.    Respiratory: Negative for chest tightness and shortness of breath.    Cardiovascular: Negative for chest pain.   Gastrointestinal: Negative for abdominal distention.   Genitourinary: Negative for difficulty urinating.     Objective:     Vital Signs (Most Recent):  Temp: 98 °F (36.7 °C) (07/23/19 0755)  Pulse: 77 (07/23/19 0755)  Resp: 19 (07/23/19 0755)  BP: (!) 112/52 (07/23/19 0755)  SpO2: 98 % (07/23/19 0755) Vital Signs (24h Range):  Temp:  [97.7 °F (36.5 °C)-98 °F (36.7 °C)] 98 °F (36.7 °C)  Pulse:  [77-90] 77  Resp:  [17-19] 19  SpO2:  [96 %-100 %] 98 %  BP: (103-137)/(46-76) 112/52     Weight: 86.5 kg (190 lb 11.2 oz)  Body mass index is 34.88 kg/m².    Intake/Output Summary (Last 24 hours) at 7/23/2019 0938  Last data filed at 7/23/2019 0451  Gross per 24 hour   Intake 720 ml   Output 120 ml   Net 600 ml      Physical Exam   Constitutional: She is oriented to person, place, and time. She appears well-developed and well-nourished.   HENT:   Head: Normocephalic and atraumatic.   Neurological: She is alert and oriented to person, place, and time.   Psychiatric: She has a normal mood and affect. Her behavior is normal.   Nursing note and vitals reviewed.      Significant Labs:   BMP: No results for input(s): GLU, NA, K, CL, CO2, BUN, CREATININE, CALCIUM, MG in the last 48 hours.  CBC:   Recent Labs   Lab 07/22/19  0520 07/23/19  0524   WBC 5.04 9.80   HGB 10.2* 10.3*   HCT 31.9* 32.3*    299       Significant Imaging:

## 2019-07-23 NOTE — NURSING
Right subclavian port a cath de-accessed and flushed with heparin per protocol. Transport by w/c for discharge.

## 2019-07-23 NOTE — PROGRESS NOTES
Pharmacokinetic Assessment Follow Up: IV Vancomycin    Vancomycin serum concentration assessment(s):    The trough level was drawned correctly and can be used to guide therapy at this time.    Vancomycin Regimen Plan:    Change regimen to Vancomycin 2000 mg IV every 24hour with next serum trough concentration measured at midnight prior to third dose on 7/26/19     Pharmacy will continue to follow and monitor vancomycin.    Please contact pharmacy at extension [phone] for questions regarding this assessment.    Thank you for the consult,   Haylee Marlow     Patient brief summary:  Ade Castellano is a 73 y.o. female initiated on antimicrobial therapy with IV Vancomycin for treatment of suspected neutropenic fever    The patient received a loading dose, followed by the current treatment regimen: vancomycin 1750 mg IV every 24 hours    Drug Allergies:   Review of patient's allergies indicates:  No Known Allergies    Actual Body Weight:   84.8 kg    Renal Function:   Estimated Creatinine Clearance: 73.4 mL/min (based on SCr of 0.7 mg/dL).,     Dialysis Method (if applicable):  none     CBC (last 72 hours):  Recent Labs   Lab Result Units 07/20/19  1249 07/21/19  0420 07/22/19  0520   WBC K/uL 2.02* 2.22* 5.04   Hemoglobin g/dL 12.8 10.8* 10.2*   Hematocrit % 37.9 32.7* 31.9*   Platelets K/uL 268 224 231   Gran% % 19.8* 43.7 73.8*   Lymph% % 45.0 26.1 11.9*   Mono% % 32.7* 30.6* 16.1*   Eosinophil% % 2.0 0.5 1.0   Basophil% % 0.5 0.9 0.4   Differential Method  Automated Automated Automated       Metabolic Panel (last 72 hours):  Recent Labs   Lab Result Units 07/20/19  1249 07/20/19  1337 07/21/19  0420   Sodium mmol/L 137  --  141   Potassium mmol/L 4.2  --  3.5   Chloride mmol/L 99  --  105   CO2 mmol/L 26  --  26   Glucose mg/dL 131*  --  146*   Glucose, UA   --  Negative  --    BUN, Bld mg/dL 12  --  8   Creatinine mg/dL 0.9  --  0.7   Albumin g/dL 4.2  --  3.4*   Total Bilirubin mg/dL 0.5  --  0.7   Alkaline  Phosphatase U/L 48*  --  39*   AST U/L 37  --  25   ALT U/L 36  --  30   Magnesium mg/dL 1.8  --   --        Vancomycin Administrations:  vancomycin given in the last 96 hours                     vancomycin (VANCOCIN) 1,750 mg in dextrose 5 % 500 mL IVPB ()  Restarted 07/23/19 0037     1,750 mg New Bag 07/21/19 2147                      Drug levels (last 3 results):  Recent Labs   Lab Result Units 07/22/19 2023   Vancomycin-Trough ug/mL 7.0*       Microbiologic Results:  Microbiology Results (last 7 days)       Procedure Component Value Units Date/Time    Blood culture [[id number]] Collected:  07/20/19 2034    Order Status:  Completed Specimen:  Blood Updated:  07/22/19 2103     Blood Culture, Routine No Growth to date      No Growth to date      No Growth to date    Blood culture [[id number]] Collected:  07/20/19 2034    Order Status:  Completed Specimen:  Blood Updated:  07/22/19 2103     Blood Culture, Routine No Growth to date      No Growth to date      No Growth to date    Blood culture #1 **CANNOT BE ORDERED STAT** [[id number]] Collected:  07/20/19 1325    Order Status:  Completed Specimen:  Blood from Peripheral, Forearm, Left Updated:  07/22/19 1503     Blood Culture, Routine No Growth to date      No Growth to date      No Growth to date    Blood culture #2 **CANNOT BE ORDERED STAT** [[id number]] Collected:  07/20/19 1335    Order Status:  Completed Specimen:  Blood from Peripheral, Hand, Left Updated:  07/22/19 1503     Blood Culture, Routine No Growth to date      No Growth to date      No Growth to date    Urine culture [[id number]] Collected:  07/20/19 1337    Order Status:  Completed Specimen:  Urine Updated:  07/22/19 0857     Urine Culture, Routine No significant isolate to date    Narrative:       Preferred Collection Type->Urine, Clean Catch    Urine Culture High Risk [[id number]]     Order Status:  Completed Specimen:  Urine, Clean Catch     Urine Culture High Risk [[id number]]     Order Status:   Canceled Specimen:  Urine

## 2019-07-23 NOTE — PROGRESS NOTES
[facility] Hospital Medicine  Progress Note    Patient Name: Ade Castellano  MRN: [id number]  Patient Class: IP- Inpatient   Admission Date: 7/20/2019  Length of Stay: 2 days  Attending Physician: Neftali Renee MD  Primary Care Provider: Jeannine Huitron MD        Subjective:     Principal Problem:Neutropenic fever      HPI:  73 y.o. female with COPD, CAD, hypertension, hyperlipidemia, SUNITHA, arthritis, peptic ulcer disease, squamous cell carcinoma of the lung, and ductal carcinoma of the breast presents with a complaint of worsening fatigue over the past 4 days.  Last chemo was 7/5/2019.  She has been overdoing it little performing yd work and he and not drinking much fluids.  She reports her shortness of breath and cough is worse than baseline.  She has home oxygen, but does not routinely wear it.  Denies fever, chills, chest pain, palpitations, orthopnea, PND, dizziness, syncope, nausea, vomiting, diarrhea, abdominal pain, bloody or black stools, dysuria, frequency, or urgency.  In the ED she was given IV fluids and nebulizer treatment with improvement.  Routine labs, chest x-ray, EKG, CT head, and urinalysis were unremarkable for any acute abnormality other than an elevated lactic acid of 2.9.  This was repeated after IV fluid administration and resulted as 3.5.  placed in observation to trend her lactic acid.  She was afebrile while in the ED, but upon arrival to the floor she spiked a temperature of 101° F. repeat blood cultures and broad-spectrum antibiotics initiated.    Overview/Hospital Course:  This is a very pleasant 73-year-old female who was placed in observation for neutropenic fever.  Patient is a cancer patient and receives chemo every 3 weeks last dose was July 2, 2019.  She tells me that she became weak and fatigued about 2 days ago she was found to have fever of 102.2 of unclear etiology at the time of presentation.  Her blood cultures are pending she is on dual  antibiotics vanc and Zosyn.  She is neutropenic with the absolute neutrophil count of 946.  The patient denies any nausea vomiting it at home, sick contacts however her blood pressure has been running low while in the hospital last 87/44.  She has received multiple boluses in the emergency room and received another bolus this morning 500 which improved her blood pressure from 89/51 to 110/51.  Her Home blood pressure meds are being held secondary to hypotension.  She remained ill appearing, last low-grade temp was 7:00 a.m. this morning at 99.5, at 5:00 a.m. was 100.  Her CT head and chest x-ray are non revealing, and urinalysis unimpressive.  At the time of presentation her lactic acid was 3.5    Given her age, hemodynamic instability, hypotension, cancer, neutropenia, and fever she is a risk versus sudden acute decline.  Will continue to give IV fluids as pressure support for now and monitor closely continue antibiotics and consulted Hematology and Infectious Disease---blood cultures pending supportive care. The patient's neutropenia resolved. ID was consulted for any recs for discharge planned on 7/23. PT/OT were consulted and recommended out patient PT/OT. ID rec: Cipro and Augmentin for 14 days. Rest of hospital course was unremarkable. The patient will be discharged today. Activity as tolerated. Diet- regular. Follow up with PCP in one week         Interval History: No new issues.       Review of Systems   Constitutional: Positive for activity change.   HENT: Negative for congestion.    Respiratory: Negative for chest tightness and shortness of breath.    Cardiovascular: Negative for chest pain.   Gastrointestinal: Negative for abdominal distention.   Genitourinary: Negative for difficulty urinating.     Objective:     Vital Signs (Most Recent):  Temp: 98 °F (36.7 °C) (07/23/19 0755)  Pulse: 77 (07/23/19 0755)  Resp: 19 (07/23/19 0755)  BP: (!) 112/52 (07/23/19 0755)  SpO2: 98 % (07/23/19 0755) Vital Signs  (24h Range):  Temp:  [97.7 °F (36.5 °C)-98 °F (36.7 °C)] 98 °F (36.7 °C)  Pulse:  [77-90] 77  Resp:  [17-19] 19  SpO2:  [96 %-100 %] 98 %  BP: (103-137)/(46-76) 112/52     Weight: 86.5 kg (190 lb 11.2 oz)  Body mass index is 34.88 kg/m².    Intake/Output Summary (Last 24 hours) at 7/23/2019 0938  Last data filed at 7/23/2019 0451  Gross per 24 hour   Intake 720 ml   Output 120 ml   Net 600 ml      Physical Exam   Constitutional: She is oriented to person, place, and time. She appears well-developed and well-nourished.   HENT:   Head: Normocephalic and atraumatic.   Neurological: She is alert and oriented to person, place, and time.   Psychiatric: She has a normal mood and affect. Her behavior is normal.   Nursing note and vitals reviewed.      Significant Labs:   BMP: No results for input(s): GLU, NA, K, CL, CO2, BUN, CREATININE, CALCIUM, MG in the last 48 hours.  CBC:   Recent Labs   Lab 07/22/19  0520 07/23/19  0524   WBC 5.04 9.80   HGB 10.2* 10.3*   HCT 31.9* 32.3*    299       Significant Imaging:      Assessment/Plan:      * Neutropenic fever  07/20/2019---Started chemo effusions earlier this month, now with leukopenia and temperature greater than 101° F. source as yet to be determined but suspect pulmonary; however,  Chest x-ray in the ED was without focal consolidation or opacity.  Blood cultures are pending.  Fever and elevated lactic acid of 3.5 for concerning.  Initiate broad-spectrum IV antibiotics, continue IV fluids.  Trend lactic acid.  Follow cultures.    07/21/2019---patient remains ill appearing hypotensive this morning with fluid bolus for pressure support her lactic acid has improved which is reassuring she is on dual antibiotics for now and has blood cultures pending; chest x-ray UA unrevealing---last temp at 5:00 a.m. this morning of 100  -consult Hematology  -consult to Infectious Disease  -continue dual antibiotics for now and narrow once a sources been detected  -continue IV  fluid  -supportive care for fever  -CBC daily CMP daily    Blood cultures NG. Neutropenia resolved. ID consulted. Follow recs for discharge   Resolved. Cipro/Augmentin for 14 days per ID.  D/C to home.       Neutropenia associated with infection  Unclear etiology at this time.  Patient denies recent illnesses or sick contacts.  She reports feeling in her usual state health until 2 days ago.  Monitor closely and consult ID while awaiting blood cultures      Chronic obstructive pulmonary disease with acute lower respiratory infection  Continue scheduled nebs and antibiotics.  Consider the addition of corticosteroids if significant wheezing or shortness of breath develops.    Metastatic breast cancer  Started chemo infusions earlier this month, 07/05/2019.  Provide as needed antiemetics and analgesics.    Squamous cell carcinoma of right lung  Started chemo infusions earlier this month, 07/05/2019    DNR (do not resuscitate)  Noted    Stable angina  07/20/2019---No acute issue, continue Imdur    07/21/2019---no complaint of chest pain at this time has been hypotensive, will continue M2 or starting tomorrow hold today and start tomorrow at a lower dose secondary to hypotension monitor closely    Hyperlipidemia LDL goal <70  Continue statin    SUNITHA (obstructive sleep apnea)  Declined CPAP--2 L oxygen supplementation    CAD (coronary artery disease)  No acute issue, continue home regimen.    Benign hypertensive heart disease without heart failure  07/20/2019---Well controlled, continue home medications and monitor blood pressure, adjust as needed.    07/21/2019---hold all home blood pressure medicines secondary to hypotension monitor blood pressure closely and continue IV fluids for pressure support    obesity Body mass index is 34.88 kg/m².  Weight loss as out patient.     Debility- will arrange out patient PT/OT.     VTE Risk Mitigation (From admission, onward)        Ordered     enoxaparin injection 40 mg  Daily       07/20/19 1825     IP VTE HIGH RISK PATIENT  Once      07/20/19 1801                Neftali Rolon MD  Department of Hospital Medicine   [facility]

## 2019-07-23 NOTE — PROGRESS NOTES
1055 TN met with pt to discuss recommendation of Outpt PT; stated she does not need outpt therapy, declined. Stated she does not see PCP, Dr. Jeannine Montgomery; only sees oncologist, Dr. Holliday. Dr. Holliday is on vacation, will see Dr. Clark. Caroline from Dr. Holliday's office contacted pt with  oncology f/u on 07/25/19 at 8:40 AM, placed in AVS.

## 2019-07-23 NOTE — DISCHARGE INSTRUCTIONS
Reviewed prescriptions for amoxicillin and augmentin don't skip doses . Follow up with pcp and oncology appt. Pt verbalized understanding .

## 2019-07-24 NOTE — DISCHARGE SUMMARY
[facility] Hospital Medicine  Discharge Summary      Patient Name: Ade Castellano  MRN: [id number]  Admission Date: 7/20/2019  Hospital Length of Stay: 2 days  Discharge Date and Time:  7/23/19 11:01 AM  Attending Physician: No att. providers found   Discharging Provider: Neftali Rolon MD  Primary Care Provider: Jeannine Huitron MD      HPI:   73 y.o. female with COPD, CAD, hypertension, hyperlipidemia, SUNITHA, arthritis, peptic ulcer disease, squamous cell carcinoma of the lung, and ductal carcinoma of the breast presents with a complaint of worsening fatigue over the past 4 days.  Last chemo was 7/5/2019.  She has been overdoing it little performing yd work and he and not drinking much fluids.  She reports her shortness of breath and cough is worse than baseline.  She has home oxygen, but does not routinely wear it.  Denies fever, chills, chest pain, palpitations, orthopnea, PND, dizziness, syncope, nausea, vomiting, diarrhea, abdominal pain, bloody or black stools, dysuria, frequency, or urgency.  In the ED she was given IV fluids and nebulizer treatment with improvement.  Routine labs, chest x-ray, EKG, CT head, and urinalysis were unremarkable for any acute abnormality other than an elevated lactic acid of 2.9.  This was repeated after IV fluid administration and resulted as 3.5.  placed in observation to trend her lactic acid.  She was afebrile while in the ED, but upon arrival to the floor she spiked a temperature of 101° F. repeat blood cultures and broad-spectrum antibiotics initiated.    * No surgery found *      Hospital Course:   This is a very pleasant 73-year-old female who was placed in observation for neutropenic fever.  Patient is a cancer patient and receives chemo every 3 weeks last dose was July 2, 2019.  She tells me that she became weak and fatigued about 2 days ago she was found to have fever of 102.2 of unclear etiology at the time of presentation.  Her blood cultures  are pending she is on dual antibiotics vanc and Zosyn.  She is neutropenic with the absolute neutrophil count of 946.  The patient denies any nausea vomiting it at home, sick contacts however her blood pressure has been running low while in the hospital last 87/44.  She has received multiple boluses in the emergency room and received another bolus this morning 500 which improved her blood pressure from 89/51 to 110/51.  Her Home blood pressure meds are being held secondary to hypotension.  She remained ill appearing, last low-grade temp was 7:00 a.m. this morning at 99.5, at 5:00 a.m. was 100.  Her CT head and chest x-ray are non revealing, and urinalysis unimpressive.  At the time of presentation her lactic acid was 3.5    Given her age, hemodynamic instability, hypotension, cancer, neutropenia, and fever she is a risk versus sudden acute decline.  Will continue to give IV fluids as pressure support for now and monitor closely continue antibiotics and consulted Hematology and Infectious Disease---blood cultures pending supportive care. The patient's neutropenia resolved. ID was consulted for any recs for discharge planned on 7/23. PT/OT were consulted and recommended out patient PT/OT. ID rec: Cipro and Augmentin for 14 days. Rest of hospital course was unremarkable. The patient will be discharged today. Activity as tolerated. Diet- low NA   Follow up with PCP in one week          Consults:   Consults (From admission, onward)        Status Ordering Provider     Inpatient consult to Hematology  Once     Provider:  Tawanda Minor MD    Completed NANDO POLLACK     Inpatient consult to Infectious Diseases  Once     Provider:  David Deluca MD    Completed NANDO POLLACK          No new Assessment & Plan notes have been filed under this hospital service since the last note was generated.  Service: Hospital Medicine    Final Active Diagnoses:    Diagnosis Date Noted POA    Chronic obstructive pulmonary  disease with acute lower respiratory infection [J44.0] 07/20/2019 Yes    Metastatic breast cancer [C50.919] 07/01/2019 Yes     Chronic    Squamous cell carcinoma of right lung [C34.91] 05/07/2018 Yes     Chronic    DNR (do not resuscitate) [Z66] 11/29/2017 Yes     Chronic    Hyperlipidemia LDL goal <70 [E78.5] 10/14/2014 Yes     Chronic    Stable angina [I20.8] 10/14/2014 Yes     Chronic    SUNITHA (obstructive sleep apnea) [G47.33]  Yes     Chronic    Benign hypertensive heart disease without heart failure [I11.9] 08/23/2013 Yes     Chronic    CAD (coronary artery disease) [I25.10] 08/23/2013 Yes     Chronic      Problems Resolved During this Admission:    Diagnosis Date Noted Date Resolved POA    PRINCIPAL PROBLEM:  Neutropenic fever [D70.9, R50.81] 07/20/2019 07/23/2019 Yes    Neutropenia associated with infection [D70.3] 07/21/2019 07/23/2019 Yes    Chemotherapy-induced neutropenia [D70.1, T45.1X5A] 07/21/2019 07/23/2019 Yes       Discharged Condition: good    Disposition: Home or Self Care    Follow Up:  Follow-up Information     Elva Perez MD On 7/25/2019.    Specialties:  Hematology and Oncology, Hematology, Oncology  Why:  Outpatient Services Oncology Follow-Up Thursday at 8:40 AM  Contact information:  [address]  [address] [place] [address] [id number] [phone]                 Patient Instructions:   No discharge procedures on file.    Significant Diagnostic Studies:     Pending Diagnostic Studies:     None         Medications:  Reconciled Home Medications:      Medication List      START taking these medications    amoxicillin-clavulanate 875-125mg 875-125 mg per tablet  Commonly known as:  AUGMENTIN  Take 1 tablet by mouth 2 (two) times daily. for 14 days     ciprofloxacin HCl 500 MG tablet  Commonly known as:  CIPRO  Take 1 tablet (500 mg total) by mouth 2 (two) times daily. for 14 days        CONTINUE taking these medications    * albuterol 90 mcg/actuation inhaler  Commonly known  as:  VENTOLIN HFA  INHALE 2 PUFF(S) BY MOUTH EVERY 4 - 6 HOURS AS NEEDED FOR SHORTNESS OF BREATH or WHEEZING     * albuterol 2.5 mg /3 mL (0.083 %) nebulizer solution  Commonly known as:  PROVENTIL  NEBULIZE 1 vial EVERY 6 HOURS AS NEEDED FOR WHEEZING     aspirin 325 MG EC tablet  Commonly known as:  ECOTRIN  Take 325 mg by mouth once daily.     fluticasone propionate 50 mcg/actuation nasal spray  Commonly known as:  FLONASE  USE TWO SPRAYS IN EACH NOSTRIL ONCE A DAY     isosorbide mononitrate 60 MG 24 hr tablet  Commonly known as:  IMDUR  Take 1 tablet (60 mg total) by mouth once daily.     metoprolol tartrate 25 MG tablet  Commonly known as:  LOPRESSOR  Take 1 tablet (25 mg total) by mouth 2 (two) times daily.     montelukast 10 mg tablet  Commonly known as:  SINGULAIR  Take 10 mg by mouth nightly. at bedtime.     NITROSTAT 0.4 MG SL tablet  Generic drug:  nitroGLYCERIN  place 1 tablet under the tongue AS NEEDED no more than 3 in 15 minutes     ondansetron 8 MG tablet  Commonly known as:  ZOFRAN  Take 1 tablet (8 mg total) by mouth every 12 (twelve) hours as needed for Nausea.     oxyCODONE-acetaminophen 5-325 mg per tablet  Commonly known as:  PERCOCET  Take 1 tablet by mouth every 12 (twelve) hours as needed for Pain.     rosuvastatin 20 MG tablet  Commonly known as:  CRESTOR  take 1/2 TABLET(S) BY MOUTH ONCE A DAY     TRELEGY ELLIPTA 100-62.5-25 mcg Dsdv  Generic drug:  fluticasone-umeclidin-vilanter  INHALE ONE PUFF INTO THE LUNGS ONCE A DAY         * This list has 2 medication(s) that are the same as other medications prescribed for you. Read the directions carefully, and ask your doctor or other care provider to review them with you.                Indwelling Lines/Drains at time of discharge:   Lines/Drains/Airways     Central Venous Catheter Line                 Port A Cath Single Lumen right subclavian -- days                Time spent on the discharge of patient:  > 30 minutes  Patient was seen and  examined on the date of discharge and determined to be suitable for discharge.         Neftali Rolon MD  Department of Hospital Medicine  [facility]

## 2019-07-24 NOTE — PROGRESS NOTES
Chief Complaint :   metastatic Breast cancer    Hx of Present illness :  Patient is a 73 y.o. year old female who presents to the clinic today for   Chemotherapy followup. About one week ago, hospitalised for fatigue, dehydration, and neutropenic fever. Was on empiric antibiotics. Blood and urine culture did not reveal any growth. . On Nasal Oxygen. On adriamycin + cytoxan regimen. Completed cycle 1.  Was given prescription for amoxil and cipro. Feeling better since discharge. Improved sense of well being.    Allergies :    Review of patient's allergies indicates:  No Known Allergies    Occupation :  retd  at refinery    Transfusion :  N/A    Menstrual & obstetric Hx :  2; Para 1.  Age of menarche: 14  Age of first pregnancy: 24  Lactation history: No  Age of menopause: 50  HRT: No    Present Meds :   Medication List with Changes/Refills   Current Medications    ALBUTEROL (PROVENTIL) 2.5 MG /3 ML (0.083 %) NEBULIZER SOLUTION    NEBULIZE 1 vial EVERY 6 HOURS AS NEEDED FOR WHEEZING    ALBUTEROL (VENTOLIN HFA) 90 MCG/ACTUATION INHALER    INHALE 2 PUFF(S) BY MOUTH EVERY 4 - 6 HOURS AS NEEDED FOR SHORTNESS OF BREATH or WHEEZING    AMOXICILLIN-CLAVULANATE 875-125MG (AUGMENTIN) 875-125 MG PER TABLET    Take 1 tablet by mouth 2 (two) times daily. for 14 days    ASPIRIN (ECOTRIN) 325 MG EC TABLET    Take 325 mg by mouth once daily.    CIPROFLOXACIN HCL (CIPRO) 500 MG TABLET    Take 1 tablet (500 mg total) by mouth 2 (two) times daily. for 14 days    FLUTICASONE (FLONASE) 50 MCG/ACTUATION NASAL SPRAY    USE TWO SPRAYS IN EACH NOSTRIL ONCE A DAY    ISOSORBIDE MONONITRATE (IMDUR) 60 MG 24 HR TABLET    Take 1 tablet (60 mg total) by mouth once daily.    METOPROLOL TARTRATE (LOPRESSOR) 25 MG TABLET    Take 1 tablet (25 mg total) by mouth 2 (two) times daily.    MONTELUKAST (SINGULAIR) 10 MG TABLET    Take 10 mg by mouth nightly. at bedtime.    NITROSTAT 0.4 MG SL TABLET    place 1 tablet under the tongue AS  NEEDED no more than 3 in 15 minutes    ONDANSETRON (ZOFRAN) 8 MG TABLET    Take 1 tablet (8 mg total) by mouth every 12 (twelve) hours as needed for Nausea.    OXYCODONE-ACETAMINOPHEN (PERCOCET) 5-325 MG PER TABLET    Take 1 tablet by mouth every 12 (twelve) hours as needed for Pain.    ROSUVASTATIN (CRESTOR) 20 MG TABLET    take 1/2 TABLET(S) BY MOUTH ONCE A DAY    TRELEGY ELLIPTA 100-62.5-25 MCG DSDV    INHALE ONE PUFF INTO THE LUNGS ONCE A DAY       Past Medical Hx : Breast cancer;   Lung Cancer; COPD. DJD; Hypertension; Hyperlipidemia; CAD    Past Medical Hx :  Reviewed.   Past Medical History:   Diagnosis Date    Acute respiratory failure with hypoxia and hypercapnia 11/29/2017    Angina pectoris     Arthritis     Bell's palsy     left facial weakness    Breast cancer     RIGHT    CAD (coronary artery disease)     Cervical cancer     Chronic bronchitis     COPD (chronic obstructive pulmonary disease)     Dr. Katz    Dental bridge present     Emphysema of lung     H/O colonoscopy 06/2017    due for repeat colonsocopy in 6/2018    History of heart artery stent     Dr. Ortiz  x2 stents    Hyperlipidemia     Hypertension     Lung cancer     Myocardial infarction     SUNITHA (obstructive sleep apnea)     intolerant to mask    Pneumonia     Pneumonia due to other staphylococcus     PUD (peptic ulcer disease)     Severe anemia 6/11/2018    Sleep apnea     Vaginal delivery     x1       Travel Hx :  N/A    Immunization :  Immunization History   Administered Date(s) Administered    Influenza - High Dose 10/05/2015    Pneumococcal Conjugate - 13 Valent 07/11/2017       Family Hx :  Family History   Problem Relation Age of Onset    Cancer Mother         breast    Heart disease Mother     Breast cancer Mother     Cancer Father         lung-smoker     Cancer Sister         lung-smoker     Heart attack Sister     Cancer Maternal Grandmother     Heart disease Maternal Grandmother     Cancer  Maternal Grandfather     Heart disease Maternal Grandfather     Cancer Paternal Grandmother     Cancer Paternal Grandfather     Cancer Sister         mets not sure where it started     No Known Problems Sister        Social Hx :  Social History     Socioeconomic History    Marital status: Single     Spouse name: Not on file    Number of children: Not on file    Years of education: Not on file    Highest education level: Not on file   Occupational History    Not on file   Social Needs    Financial resource strain: Not on file    Food insecurity:     Worry: Not on file     Inability: Not on file    Transportation needs:     Medical: Not on file     Non-medical: Not on file   Tobacco Use    Smoking status: Former Smoker     Packs/day: 0.25     Years: 50.00     Pack years: 12.50     Types: Cigarettes     Last attempt to quit: 2017     Years since quittin.7    Smokeless tobacco: Never Used   Substance and Sexual Activity    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 Right Breast Cancer; Coronary artery stent; conisation Bx. Nima Cath    Symptoms :    Review of Systems   Constitutional: Positive for malaise/fatigue. Negative for chills (Few night ago.) and fever.   HENT: Positive for congestion. Negative for nosebleeds.    Eyes: Negative for blurred vision, double vision and photophobia.        Watering of eyes    Respiratory: Positive for cough, sputum production (Clear) and shortness of breath.         Clear expectoration   Cardiovascular: Negative for chest pain, palpitations, claudication, leg swelling and PND.   Gastrointestinal: Positive for constipation. Negative for abdominal pain, blood in stool, diarrhea, heartburn, melena, nausea and vomiting.   Genitourinary: Negative.  Negative for dysuria, flank pain, frequency, hematuria and urgency.   Musculoskeletal: Negative.  Negative for back pain, falls, joint pain, myalgias and neck pain.   Skin: Positive for rash. Negative for itching.   Neurological: Negative for dizziness, tingling, tremors, sensory change and headaches.   Endo/Heme/Allergies: Negative for environmental allergies. Does not bruise/bleed easily.   Psychiatric/Behavioral: Negative for depression and memory loss. The patient is not nervous/anxious and does not have insomnia.        Physical Exam :   Physical Exam   Constitutional: She is oriented to person, place, and time and well-developed, well-nourished, and in no distress. Vital signs are normal. No distress.   HENT:   Head: Normocephalic and atraumatic.   Right Ear: External ear normal.   Left Ear: External ear normal.   Nose: Nose normal.   Mouth/Throat: Oropharynx is clear and moist.   Eyes: Pupils are equal, round, and reactive to light. Conjunctivae, EOM and lids are normal. Lids are everted and swept, no foreign bodies found. Right eye exhibits no discharge. Left eye exhibits no discharge. No scleral icterus.   Neck: Trachea normal, normal range of motion, full passive range of motion without pain and phonation normal. Neck supple. Normal carotid pulses, no hepatojugular reflux and no JVD present. Carotid bruit is not present. No tracheal deviation present. No thyroid mass and no thyromegaly present.   Cardiovascular: Normal rate, regular rhythm, normal heart sounds, intact distal pulses and normal pulses. PMI is not displaced. Exam reveals  no gallop.   No murmur heard.  Pulmonary/Chest: Effort normal and breath sounds normal. No stridor. No respiratory distress. She has no wheezes. She has no rales.         She exhibits no tenderness.   Abdominal: Soft. Bowel sounds are normal. She exhibits no distension, no ascites and no mass. There is no hepatosplenomegaly. There is no tenderness. There is no rebound, no guarding and no CVA tenderness. No hernia.   Genitourinary:   Genitourinary Comments: Not examined   Musculoskeletal: Normal range of motion. She exhibits no edema, tenderness or deformity.   Lymphadenopathy:        Head (right side): No submental, no submandibular, no tonsillar, no preauricular, no posterior auricular and no occipital adenopathy present.        Head (left side): No submental, no submandibular, no tonsillar, no preauricular, no posterior auricular and no occipital adenopathy present.     She has no cervical adenopathy.     She has no axillary adenopathy.        Right: No inguinal, no supraclavicular and no epitrochlear adenopathy present.        Left: No inguinal, no supraclavicular and no epitrochlear adenopathy present.   Neurological: She is alert and oriented to person, place, and time. She has normal sensation, normal strength, normal reflexes and intact cranial nerves. No cranial nerve deficit. She exhibits normal muscle tone. Gait normal. Coordination normal. GCS score is 15.   Skin: Skin is warm, dry and intact. Rash noted. She is not diaphoretic. No cyanosis or erythema. No pallor. Nails show no clubbing.        Fever blister upper lip.   Psychiatric: Mood, memory, affect and judgment normal.         Labs & Imaging :  07/21/19 : NFBS 146; Cr.0.7; Ca 8.6 Bili 0.7 ALP 39;  07/23/19 : WBC 9,800. Hgb 10.3; Hct 32.3 MCV 93. ANC 7,900. Platelets 297,000. Blood and urine cultures did not reveal any growth.        Dx :   Breast cancer. Chemotherapy followup.      Assessment & Plan:  Discussed with patient. Cleared for cycle 2.   Followup with Dr. Holliday.  D/c CIPRO and amoxil. CBC,CMP before next visit If Sx worsen go to ER. Patient understands and verbalised.

## 2019-07-25 ENCOUNTER — OFFICE VISIT (OUTPATIENT)
Dept: HEMATOLOGY/ONCOLOGY | Facility: CLINIC | Age: 74
End: 2019-07-25
Payer: MEDICARE

## 2019-07-25 VITALS
WEIGHT: 187.81 LBS | BODY MASS INDEX: 33.28 KG/M2 | SYSTOLIC BLOOD PRESSURE: 120 MMHG | HEIGHT: 63 IN | HEART RATE: 62 BPM | TEMPERATURE: 98 F | DIASTOLIC BLOOD PRESSURE: 73 MMHG | OXYGEN SATURATION: 95 %

## 2019-07-25 DIAGNOSIS — Z09 CHEMOTHERAPY FOLLOW-UP EXAMINATION: ICD-10-CM

## 2019-07-25 DIAGNOSIS — C50.919 METASTATIC BREAST CANCER: Primary | ICD-10-CM

## 2019-07-25 DIAGNOSIS — C34.91 SQUAMOUS CELL CARCINOMA OF RIGHT LUNG: ICD-10-CM

## 2019-07-25 LAB
BACTERIA BLD CULT: NORMAL

## 2019-07-25 PROCEDURE — 99214 PR OFFICE/OUTPT VISIT, EST, LEVL IV, 30-39 MIN: ICD-10-PCS | Mod: HCNC,S$GLB,, | Performed by: INTERNAL MEDICINE

## 2019-07-25 PROCEDURE — 99214 OFFICE O/P EST MOD 30 MIN: CPT | Mod: HCNC,S$GLB,, | Performed by: INTERNAL MEDICINE

## 2019-07-25 PROCEDURE — 99499 UNLISTED E&M SERVICE: CPT | Mod: HCNC,S$GLB,, | Performed by: INTERNAL MEDICINE

## 2019-07-25 PROCEDURE — 99499 RISK ADDL DX/OHS AUDIT: ICD-10-PCS | Mod: HCNC,S$GLB,, | Performed by: INTERNAL MEDICINE

## 2019-07-25 PROCEDURE — 99999 PR PBB SHADOW E&M-EST. PATIENT-LVL III: CPT | Mod: PBBFAC,HCNC,, | Performed by: INTERNAL MEDICINE

## 2019-07-25 PROCEDURE — 1101F PT FALLS ASSESS-DOCD LE1/YR: CPT | Mod: HCNC,CPTII,S$GLB, | Performed by: INTERNAL MEDICINE

## 2019-07-25 PROCEDURE — 1101F PR PT FALLS ASSESS DOC 0-1 FALLS W/OUT INJ PAST YR: ICD-10-PCS | Mod: HCNC,CPTII,S$GLB, | Performed by: INTERNAL MEDICINE

## 2019-07-25 PROCEDURE — 99999 PR PBB SHADOW E&M-EST. PATIENT-LVL III: ICD-10-PCS | Mod: PBBFAC,HCNC,, | Performed by: INTERNAL MEDICINE

## 2019-07-25 RX ORDER — HEPARIN 100 UNIT/ML
500 SYRINGE INTRAVENOUS
Status: CANCELLED | OUTPATIENT
Start: 2019-07-26

## 2019-07-25 RX ORDER — SODIUM CHLORIDE 0.9 % (FLUSH) 0.9 %
10 SYRINGE (ML) INJECTION
Status: CANCELLED | OUTPATIENT
Start: 2019-07-26

## 2019-07-25 RX ORDER — DOXORUBICIN HYDROCHLORIDE 2 MG/ML
105 INJECTION, SOLUTION INTRAVENOUS
Status: CANCELLED | OUTPATIENT
Start: 2019-07-26

## 2019-07-26 ENCOUNTER — INFUSION (OUTPATIENT)
Dept: INFUSION THERAPY | Facility: HOSPITAL | Age: 74
End: 2019-07-26
Attending: PHYSICIAN ASSISTANT
Payer: MEDICARE

## 2019-07-26 VITALS
HEART RATE: 58 BPM | SYSTOLIC BLOOD PRESSURE: 140 MMHG | DIASTOLIC BLOOD PRESSURE: 72 MMHG | TEMPERATURE: 98 F | OXYGEN SATURATION: 96 % | RESPIRATION RATE: 18 BRPM

## 2019-07-26 DIAGNOSIS — C50.919 METASTATIC BREAST CANCER: Primary | ICD-10-CM

## 2019-07-26 PROCEDURE — 96367 TX/PROPH/DG ADDL SEQ IV INF: CPT | Mod: HCNC

## 2019-07-26 PROCEDURE — 96411 CHEMO IV PUSH ADDL DRUG: CPT | Mod: HCNC

## 2019-07-26 PROCEDURE — 96377 APPLICATON ON-BODY INJECTOR: CPT | Mod: HCNC

## 2019-07-26 PROCEDURE — 25000003 PHARM REV CODE 250: Mod: HCNC | Performed by: INTERNAL MEDICINE

## 2019-07-26 PROCEDURE — 63600175 PHARM REV CODE 636 W HCPCS: Mod: HCNC | Performed by: INTERNAL MEDICINE

## 2019-07-26 PROCEDURE — 96413 CHEMO IV INFUSION 1 HR: CPT | Mod: HCNC

## 2019-07-26 PROCEDURE — A4216 STERILE WATER/SALINE, 10 ML: HCPCS | Mod: HCNC | Performed by: INTERNAL MEDICINE

## 2019-07-26 RX ORDER — HEPARIN 100 UNIT/ML
500 SYRINGE INTRAVENOUS
Status: DISCONTINUED | OUTPATIENT
Start: 2019-07-26 | End: 2019-07-26 | Stop reason: HOSPADM

## 2019-07-26 RX ORDER — SODIUM CHLORIDE 0.9 % (FLUSH) 0.9 %
10 SYRINGE (ML) INJECTION
Status: DISCONTINUED | OUTPATIENT
Start: 2019-07-26 | End: 2019-07-26 | Stop reason: HOSPADM

## 2019-07-26 RX ORDER — DOXORUBICIN HYDROCHLORIDE 2 MG/ML
105 INJECTION, SOLUTION INTRAVENOUS
Status: COMPLETED | OUTPATIENT
Start: 2019-07-26 | End: 2019-07-26

## 2019-07-26 RX ADMIN — CYCLOPHOSPHAMIDE 1055 MG: 1 INJECTION, POWDER, FOR SOLUTION INTRAVENOUS; ORAL at 10:07

## 2019-07-26 RX ADMIN — HEPARIN 500 UNITS: 100 SYRINGE at 11:07

## 2019-07-26 RX ADMIN — DOXORUBICIN HYDROCHLORIDE 106 MG: 2 INJECTION, SOLUTION INTRAVENOUS at 09:07

## 2019-07-26 RX ADMIN — PALONOSETRON HYDROCHLORIDE: 0.25 INJECTION INTRAVENOUS at 08:07

## 2019-07-26 RX ADMIN — Medication 10 ML: at 11:07

## 2019-07-26 RX ADMIN — APREPITANT 130 MG: 130 INJECTION, EMULSION INTRAVENOUS at 09:07

## 2019-07-26 RX ADMIN — PEGFILGRASTIM 6 MG: KIT SUBCUTANEOUS at 11:07

## 2019-07-26 NOTE — PLAN OF CARE
Problem: Adult Inpatient Plan of Care  Goal: Plan of Care Review  Outcome: Ongoing (interventions implemented as appropriate)  Patient arrived to unit for Cycle 2 Adriamycin Cytoxan. VSS. Afebrile at this time. Denies NVD, constipation or decreased appetite. Mouth sores noted to patient's upper and lower lip. States the sores appeared the day she was discharged from the hospital. Skin around mouth is clean, dry, intact. No drainage from sores. Patient denies any sores inside mouth or throat and states she is putting ointment on the sores to prevent cracking. No other complaints at this time. Patient completed Cycle 2 AC. Tolerated well. Neulasta OBI placed to right upper arm. Instructions reviewed with patient. Verbalized understanding. Patient received discharge instructions and follow up appointments. Verbalized understanding and ambulated unassisted off unit by self.

## 2019-08-02 ENCOUNTER — TELEPHONE (OUTPATIENT)
Dept: HEMATOLOGY/ONCOLOGY | Facility: CLINIC | Age: 74
End: 2019-08-02

## 2019-08-02 DIAGNOSIS — C50.919 METASTATIC BREAST CANCER: Primary | ICD-10-CM

## 2019-08-02 NOTE — TELEPHONE ENCOUNTER
The patient called in stating that she feels like she is getting a soar throat and her mouth is soar after chemo. The patient states that she have some ciprofloxacin and amoxicillin can she take them? As per Dr Holliday the patient was instructed that she should not take them. Dr Holliday sent a RX to her pharmacy for magic mouthwash. The patient verbalized that she understands.

## 2019-08-05 RX ORDER — ROSUVASTATIN CALCIUM 20 MG/1
TABLET, COATED ORAL
Qty: 30 TABLET | Refills: 3 | Status: SHIPPED | OUTPATIENT
Start: 2019-08-05 | End: 2019-09-06 | Stop reason: SDUPTHER

## 2019-08-05 NOTE — TELEPHONE ENCOUNTER
----- Message from Rachelle Mcdaniel sent at 8/5/2019 10:30 AM CDT -----  Contact: Self   Type: Patient Call Back    What is the request in detail: Pt calling to check status on medication.     rosuvastatin calcium 20 MG     Can the clinic reply by [facility]? No    Would the patient rather a call back or a response via [facility]? Call back    Best call back number: [phone]

## 2019-08-15 RX ORDER — HEPARIN 100 UNIT/ML
500 SYRINGE INTRAVENOUS
Status: CANCELLED | OUTPATIENT
Start: 2019-08-16

## 2019-08-15 RX ORDER — SODIUM CHLORIDE 0.9 % (FLUSH) 0.9 %
10 SYRINGE (ML) INJECTION
Status: CANCELLED | OUTPATIENT
Start: 2019-08-16

## 2019-08-15 RX ORDER — DOXORUBICIN HYDROCHLORIDE 2 MG/ML
105 INJECTION, SOLUTION INTRAVENOUS
Status: CANCELLED | OUTPATIENT
Start: 2019-08-16

## 2019-08-16 ENCOUNTER — LAB VISIT (OUTPATIENT)
Dept: LAB | Facility: HOSPITAL | Age: 74
End: 2019-08-16
Attending: INTERNAL MEDICINE
Payer: MEDICARE

## 2019-08-16 ENCOUNTER — OFFICE VISIT (OUTPATIENT)
Dept: HEMATOLOGY/ONCOLOGY | Facility: CLINIC | Age: 74
End: 2019-08-16
Payer: MEDICARE

## 2019-08-16 ENCOUNTER — INFUSION (OUTPATIENT)
Dept: INFUSION THERAPY | Facility: HOSPITAL | Age: 74
End: 2019-08-16
Attending: INTERNAL MEDICINE
Payer: MEDICARE

## 2019-08-16 VITALS
HEIGHT: 63 IN | BODY MASS INDEX: 31.13 KG/M2 | OXYGEN SATURATION: 96 % | WEIGHT: 175.69 LBS | DIASTOLIC BLOOD PRESSURE: 85 MMHG | SYSTOLIC BLOOD PRESSURE: 131 MMHG | HEART RATE: 65 BPM | TEMPERATURE: 98 F

## 2019-08-16 VITALS
RESPIRATION RATE: 18 BRPM | TEMPERATURE: 98 F | SYSTOLIC BLOOD PRESSURE: 153 MMHG | HEART RATE: 56 BPM | OXYGEN SATURATION: 97 % | DIASTOLIC BLOOD PRESSURE: 74 MMHG

## 2019-08-16 DIAGNOSIS — R59.0 AXILLARY LYMPHADENOPATHY: ICD-10-CM

## 2019-08-16 DIAGNOSIS — Z09 CHEMOTHERAPY FOLLOW-UP EXAMINATION: ICD-10-CM

## 2019-08-16 DIAGNOSIS — C50.919 METASTATIC BREAST CANCER: Primary | ICD-10-CM

## 2019-08-16 DIAGNOSIS — T45.1X5A ANTINEOPLASTIC CHEMOTHERAPY INDUCED ANEMIA: ICD-10-CM

## 2019-08-16 DIAGNOSIS — C34.91 SQUAMOUS CELL CARCINOMA OF RIGHT LUNG: ICD-10-CM

## 2019-08-16 DIAGNOSIS — C50.919 METASTATIC BREAST CANCER: ICD-10-CM

## 2019-08-16 DIAGNOSIS — J44.0 CHRONIC OBSTRUCTIVE PULMONARY DISEASE WITH ACUTE LOWER RESPIRATORY INFECTION: ICD-10-CM

## 2019-08-16 DIAGNOSIS — C50.919 RECURRENT BREAST CANCER, UNSPECIFIED LATERALITY: Primary | ICD-10-CM

## 2019-08-16 DIAGNOSIS — D64.81 ANTINEOPLASTIC CHEMOTHERAPY INDUCED ANEMIA: ICD-10-CM

## 2019-08-16 LAB
ALBUMIN SERPL BCP-MCNC: 3.9 G/DL (ref 3.5–5.2)
ALP SERPL-CCNC: 63 U/L (ref 55–135)
ALT SERPL W/O P-5'-P-CCNC: 16 U/L (ref 10–44)
ANION GAP SERPL CALC-SCNC: 10 MMOL/L (ref 8–16)
AST SERPL-CCNC: 17 U/L (ref 10–40)
BASOPHILS # BLD AUTO: 0.1 K/UL (ref 0–0.2)
BASOPHILS NFR BLD: 2.2 % (ref 0–1.9)
BILIRUB SERPL-MCNC: 0.3 MG/DL (ref 0.1–1)
BUN SERPL-MCNC: 12 MG/DL (ref 8–23)
CALCIUM SERPL-MCNC: 10.1 MG/DL (ref 8.7–10.5)
CHLORIDE SERPL-SCNC: 105 MMOL/L (ref 95–110)
CO2 SERPL-SCNC: 28 MMOL/L (ref 23–29)
CREAT SERPL-MCNC: 0.8 MG/DL (ref 0.5–1.4)
DIFFERENTIAL METHOD: ABNORMAL
EOSINOPHIL # BLD AUTO: 0 K/UL (ref 0–0.5)
EOSINOPHIL NFR BLD: 0.4 % (ref 0–8)
ERYTHROCYTE [DISTWIDTH] IN BLOOD BY AUTOMATED COUNT: 17.5 % (ref 11.5–14.5)
EST. GFR  (AFRICAN AMERICAN): >60 ML/MIN/1.73 M^2
EST. GFR  (NON AFRICAN AMERICAN): >60 ML/MIN/1.73 M^2
GLUCOSE SERPL-MCNC: 137 MG/DL (ref 70–110)
HCT VFR BLD AUTO: 35.9 % (ref 37–48.5)
HGB BLD-MCNC: 11.2 G/DL (ref 12–16)
LYMPHOCYTES # BLD AUTO: 0.7 K/UL (ref 1–4.8)
LYMPHOCYTES NFR BLD: 15.4 % (ref 18–48)
MAGNESIUM SERPL-MCNC: 1.8 MG/DL (ref 1.6–2.6)
MCH RBC QN AUTO: 29.8 PG (ref 27–31)
MCHC RBC AUTO-ENTMCNC: 31.2 G/DL (ref 32–36)
MCV RBC AUTO: 96 FL (ref 82–98)
MONOCYTES # BLD AUTO: 0.8 K/UL (ref 0.3–1)
MONOCYTES NFR BLD: 18.3 % (ref 4–15)
NEUTROPHILS # BLD AUTO: 2.8 K/UL (ref 1.8–7.7)
NEUTROPHILS NFR BLD: 63.7 % (ref 38–73)
PLATELET # BLD AUTO: 548 K/UL (ref 150–350)
PLATELET BLD QL SMEAR: ABNORMAL
PMV BLD AUTO: 9.3 FL (ref 9.2–12.9)
POTASSIUM SERPL-SCNC: 4.4 MMOL/L (ref 3.5–5.1)
PROT SERPL-MCNC: 7.1 G/DL (ref 6–8.4)
RBC # BLD AUTO: 3.76 M/UL (ref 4–5.4)
SODIUM SERPL-SCNC: 143 MMOL/L (ref 136–145)
WBC # BLD AUTO: 4.48 K/UL (ref 3.9–12.7)

## 2019-08-16 PROCEDURE — 96367 TX/PROPH/DG ADDL SEQ IV INF: CPT | Mod: HCNC

## 2019-08-16 PROCEDURE — 96411 CHEMO IV PUSH ADDL DRUG: CPT | Mod: HCNC

## 2019-08-16 PROCEDURE — 99499 RISK ADDL DX/OHS AUDIT: ICD-10-PCS | Mod: HCNC,S$GLB,, | Performed by: INTERNAL MEDICINE

## 2019-08-16 PROCEDURE — 83735 ASSAY OF MAGNESIUM: CPT | Mod: HCNC

## 2019-08-16 PROCEDURE — 96413 CHEMO IV INFUSION 1 HR: CPT | Mod: HCNC

## 2019-08-16 PROCEDURE — 63600175 PHARM REV CODE 636 W HCPCS: Mod: JG,HCNC | Performed by: INTERNAL MEDICINE

## 2019-08-16 PROCEDURE — 1101F PT FALLS ASSESS-DOCD LE1/YR: CPT | Mod: HCNC,CPTII,S$GLB, | Performed by: INTERNAL MEDICINE

## 2019-08-16 PROCEDURE — 99999 PR PBB SHADOW E&M-EST. PATIENT-LVL III: CPT | Mod: PBBFAC,HCNC,, | Performed by: INTERNAL MEDICINE

## 2019-08-16 PROCEDURE — 99999 PR PBB SHADOW E&M-EST. PATIENT-LVL III: ICD-10-PCS | Mod: PBBFAC,HCNC,, | Performed by: INTERNAL MEDICINE

## 2019-08-16 PROCEDURE — 80053 COMPREHEN METABOLIC PANEL: CPT | Mod: HCNC

## 2019-08-16 PROCEDURE — 99214 OFFICE O/P EST MOD 30 MIN: CPT | Mod: HCNC,S$GLB,, | Performed by: INTERNAL MEDICINE

## 2019-08-16 PROCEDURE — 85025 COMPLETE CBC W/AUTO DIFF WBC: CPT | Mod: HCNC

## 2019-08-16 PROCEDURE — 99499 UNLISTED E&M SERVICE: CPT | Mod: HCNC,S$GLB,, | Performed by: INTERNAL MEDICINE

## 2019-08-16 PROCEDURE — 25000003 PHARM REV CODE 250: Mod: HCNC | Performed by: INTERNAL MEDICINE

## 2019-08-16 PROCEDURE — 36415 COLL VENOUS BLD VENIPUNCTURE: CPT | Mod: HCNC

## 2019-08-16 PROCEDURE — 99214 PR OFFICE/OUTPT VISIT, EST, LEVL IV, 30-39 MIN: ICD-10-PCS | Mod: HCNC,S$GLB,, | Performed by: INTERNAL MEDICINE

## 2019-08-16 PROCEDURE — 1101F PR PT FALLS ASSESS DOC 0-1 FALLS W/OUT INJ PAST YR: ICD-10-PCS | Mod: HCNC,CPTII,S$GLB, | Performed by: INTERNAL MEDICINE

## 2019-08-16 PROCEDURE — A4216 STERILE WATER/SALINE, 10 ML: HCPCS | Mod: HCNC | Performed by: INTERNAL MEDICINE

## 2019-08-16 RX ORDER — HEPARIN 100 UNIT/ML
500 SYRINGE INTRAVENOUS
Status: DISCONTINUED | OUTPATIENT
Start: 2019-08-16 | End: 2019-08-16 | Stop reason: HOSPADM

## 2019-08-16 RX ORDER — DESLORATADINE 5 MG/1
5 TABLET ORAL DAILY
Refills: 11 | COMMUNITY
Start: 2019-08-02 | End: 2020-02-07

## 2019-08-16 RX ORDER — DOXORUBICIN HYDROCHLORIDE 2 MG/ML
105 INJECTION, SOLUTION INTRAVENOUS
Status: COMPLETED | OUTPATIENT
Start: 2019-08-16 | End: 2019-08-16

## 2019-08-16 RX ORDER — SODIUM CHLORIDE 0.9 % (FLUSH) 0.9 %
10 SYRINGE (ML) INJECTION
Status: DISCONTINUED | OUTPATIENT
Start: 2019-08-16 | End: 2019-08-16 | Stop reason: HOSPADM

## 2019-08-16 RX ADMIN — DOXORUBICIN HYDROCHLORIDE 106 MG: 2 INJECTION, SOLUTION INTRAVENOUS at 10:08

## 2019-08-16 RX ADMIN — PEGFILGRASTIM 6 MG: KIT SUBCUTANEOUS at 11:08

## 2019-08-16 RX ADMIN — HEPARIN 500 UNITS: 100 SYRINGE at 11:08

## 2019-08-16 RX ADMIN — DEXAMETHASONE SODIUM PHOSPHATE: 4 INJECTION, SOLUTION INTRA-ARTICULAR; INTRALESIONAL; INTRAMUSCULAR; INTRAVENOUS; SOFT TISSUE at 09:08

## 2019-08-16 RX ADMIN — APREPITANT 130 MG: 130 INJECTION, EMULSION INTRAVENOUS at 09:08

## 2019-08-16 RX ADMIN — CYCLOPHOSPHAMIDE 1055 MG: 1 INJECTION, POWDER, FOR SOLUTION INTRAVENOUS; ORAL at 10:08

## 2019-08-16 RX ADMIN — Medication 10 ML: at 11:08

## 2019-08-16 NOTE — PLAN OF CARE
Problem: Adult Inpatient Plan of Care  Goal: Plan of Care Review  Outcome: Ongoing (interventions implemented as appropriate)  Patient completed Cycle 3 Adriamycin Cytoxan. Tolerated well. VSS. Patient complains of increased fatigue that does not interfere with ADLs. Patient states previous mouth sores have resolved. Denies NVD or constipation. She still has numbness/tingling in bilateral feet from previous chemotherapy. Neulasta OBI placed to patient right arm. Patient received discharge instructions and follow up appointments. Verbalized understanding and ambulated unassisted off unit by self.

## 2019-08-16 NOTE — PROGRESS NOTES
Subjective:       Patient ID: Ade Castellano is a 73 y.o. female.    Chief Complaint: Follow-up and Fatigue    Fatigue   Associated symptoms include arthralgias, coughing and fatigue. Pertinent negatives include no abdominal pain, chest pain, fever, headaches or rash.          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   Pathology showed a 1.15 cm, grade 3 infiltrating ductal carcinoma.  One sentinel lymph node was negative for malignancy.  Margins were negative and the closest margin was 1 cm.  She was staged a little pT1c little pN0 cM0 stage IA.  She declined adjuvant chemo therapy.  She completed post operative radiation therapy at 400 cGy per fraction to 4000 cGy 9/2014         Pt hospitalized 11/2017   Ms. Castellano was admitted for acute on chronic respiratory failure requiring intubation and ventilation. Has large lung mass in right lung with probable post obstructive pneumonia resulting in COPD exacerbation. But also, patient had ran out of home O2 prior to onset of symptoms, likely contributing to presentation. Initiated on broad spectrum abx, IV steroids, duo-nebs and pulmonology consulted for ventilator co-management. Successfully extubated to BiPAP on 11/30. Then de-escalated to low flow nasal cannula. Abx tailored to augmentin for broad coverage, including anaerobic bacteria /     CTA chest 11/29/2017 revealed   Large right hilar mass with involvement of adjacent segmental pulmonary arterial branches and bronchi with associated postobstructive atelectasis and volume loss in the right middle lobe with adjacent ground glass opacity.  Findings highly concerning for underlying neoplastic process. Mediastinal and axillary adenopathy, with a right axillary lymph node  measuring up to 2.0 cm in short axis diameter. No definite evidence of pulmonary thromboembolism.    She is followed by Pulmonology   She underwent rt axillary LN bx at outside facility- on 1/16/2018 at [facility]  Pathology revealed metastatic poorly differentiated carcinoma of unknown primary site  TTF-1 negative, napsin negative  , cytokeratin 7 positive, cytokeratin 20 negative, p63 negative, cytokeratin 5/6 focal dim positivity    She next underwent lung bx at [facility] l1/31/2018   Pathology revealed benign lung tissue    She underwent Repeat Right Lung Bx 2/14/2018 Pathology reveals Squamous cell carcinoma  PD-L1 10% low expression  EGFR NEG  ALK NEG  BRAF NEG      Outside slides axillary LN specimen  requested for review/comparison to lung bx findings     1) Right Lung Bx 2/14/2018  Supplemental Diagnosis  Immunohistochemical stains show strong nuclear staining for p63 in essentially all tumor cells and very strong  cytoplasmic and membrane staining for CK5/6, also in essentially all tumor cells. TTF-1 and CK7 are negative  within tumor cells but do stain native pulmonary elements present within the biopsy. A stain for mucicarmine is  negative. Positive and negative controls function appropriately.  Final diagnosis: Specimen submitted as right lung biopsy  -Squamous cell carcinoma  (Electronically Signed: 2018-02-20 09:12:07 )  Diagnosed by: Phong Thompson  FINAL PATHOLOGIC DIAGNOSIS  Fragments of pulmonary parenchyma (submitted as right lung biopsy):    FINAL PATHOLOGIC DIAGNOSIS 1. Lymph node, right axilla, biopsy, review of 10 outside slides [facility], JS [id number] 1 [id number],  collected January 11, 2018: Metastatic poorly differentiated carcinoma (see comment).  The histologic section shows fibrous stroma infiltrated by nest of poorly differentiated malignant cells including  occasional dyskeratotic cells morphologically suggestive of metastatic squamous cell  carcinoma.  Immunohistochemical stains are nonspecific; the cells are positive for cytokeratin 7 and cytokeratin 5/6 (focal) and  negative for cytokeratin 20, TTF-1, p63, GCDFP, mammoglobin, and CEA      PET/CT  4/19/2018 revealed there has been a excellent response to therapy.  At least 90% reduction in malignant activity.    She s/p  cycle 6 of carboplatin/Abraxane completed 7/2018        PET/CT 2/21/2019 increased size and irregularity of the right axillary lymph node with increased FDG avidity     MAMMO/US rt breast 2/2019 revealed suspicious findings rt axilla LN.  Biopsy of the lymph node measuring 1.4 x 1.1 x 1.3 recommended    Pathology 3/11/2019   FINAL PATHOLOGIC DIAGNOSIS  Right axilla, mass, core biopsy:  Positive for poorly differentiated carcinoma.  he staining profile of the current axillary mass match more closely with the previous  breast carcinoma (due to the p63 negativity in both the 2014 breast cancer and the current axillary mass lesion but  p63 positivity in the lung mass from 2018).  This staining profile, together with the comparison with the patient's prior breast tumor and prior lung tumor supports  a diagnosis of poorly differentiated carcinoma and the malignancy appears morphologically similar to the prior  malignancies from both sites, but the staining profile in this small sample from the axillary mass more closely  correlates with the prior breast malignancy. Pathologic subclassification of this malignancy's primary location is not  definitive and clinical correlation is recommended definitively decide on possible primary location in this patient's  axillary mass sample.  Supplemental (2):  Additional immunohistochemical staining for progesterone receptor and HER2 are completed per clinician request  with following results:  Progesterone receptor: Negative; 0% nuclear tumor cell staining.  HER2: Negative; stain score = 0.  Supplemental (3):    Slides sent to [facility] for outside  review  It was determined that agree with  interpretation of this case. In my opinion, the lung tumor corresponds to a squamous cell  carcinoma and appears to be unrelated to the invasive ductal carcinoma of the breast. The axillary mass, in my  opinion, corresponds to metastases of the breast primary. Although it is a poorly differentiated carcinoma, the  immunophenotype is most consistent with the one that the breast primary exhibited, and is inconsistent with a  metastatic squamous cell carcinoma. I      Further testing sent for cancer type ID also sent and results have revealed molecular diagnosis testing revealed head and neck salivary gland carcinoma probability 84% breast adenocarcinoma could not be excluded with a 12% probability      She is s/p AC q21d x 2 cycles   Today, she reports fatigue  Mild LGOAN  She is nervous/anxious  She reports wt loss secondary to mouth sores- now resolved  Appetite now improving   She she continues with  cough  She has diffuse arthralgias-stable  She reports swelling in rt arm   No hemoptysis   No bleeding-urinary/rectal/nasal       This is a very pleasant 73-year-old female who was placed in observation for neutropenic fever.  Patient is a cancer patient and receives chemo every 3 weeks last dose was July 2, 2019.  She tells me that she became weak and fatigued about 2 days ago she was found to have fever of 102.2 of unclear etiology at the time of presentation.  Her blood cultures are pending she is on dual antibiotics vanc and Zosyn.  She is neutropenic with the absolute neutrophil count of 946.  The patient denies any nausea vomiting it at home, sick contacts however her blood pressure has been running low while in the hospital last 87/44.  She has received multiple boluses in the emergency room and received another bolus this morning 500 which improved her blood pressure from 89/51 to 110/51.  Her Home blood pressure meds are being held secondary to hypotension.  She  remained ill appearing, last low-grade temp was 7:00 a.m. this morning at 99.5, at 5:00 a.m. was 100.  Her CT head and chest x-ray are non revealing, and urinalysis unimpressive.  At the time of presentation her lactic acid was 3.  Blood and urine cultures did not reveal any growth.          PrevHx:She had an abnormal mammogram 6/4/2014 which  Revealed a round solid mass 6mm in rt breast.  She then underwent U/S guided core bx of rt breast mass on 6/17/2014.  Pathology revealed infiltrating ductal carcinoma Grade 3 with tumor  Present in thin-walled spaces suggestive of lymphatic spaces. Hormone  Receptor status on tumor specimen revealed ER negative 0% ,  NJ negative 0% and Her 2 jose maria negative.  She subsequently underwent rt segmental mastectomy and SLN bx  On 7/11/2014. Pathology of rt breast lumpectomy revealed invasive  Ductal carcinoma with micropapillary pattern ( Invasive micropapillary  Ca) with max tumor dimension 11.5mm with suggestion of tumor in   Thin walled spaces c/w lymphovascular involvement. Surgical  Margins free of tumor, grade 3, ER/NJ neg , Her 2 jose maria neg   With [place] Lymph node negative for Neoplasm.   Pathologic staging [id number](i-)  Her mother was diagnosed with breast cancer in her 50's/  She has no family history of ovarian cancer.           Review of Systems   Constitutional: Positive for fatigue. Negative for appetite change, fever and unexpected weight change.   HENT: Negative for mouth sores and rhinorrhea.    Eyes: Negative for visual disturbance.   Respiratory: Positive for cough and shortness of breath (mild).    Cardiovascular: Negative for chest pain.   Gastrointestinal: Negative for abdominal pain and diarrhea.   Genitourinary: Negative for frequency.   Musculoskeletal: Positive for arthralgias. Negative for back pain.   Skin: Negative for rash.   Neurological: Negative for dizziness and headaches.   Hematological: Negative for adenopathy.   Psychiatric/Behavioral: The patient is  "nervous/anxious.        Objective:        Vitals:    08/16/19 0811   BP: 131/85   BP Location: Left arm   Patient Position: Sitting   BP Method: Medium (Automatic)   Pulse: 65   Temp: 97.5 °F (36.4 °C)   TempSrc: Oral   SpO2: 96%   Weight: 79.7 kg (175 lb 11.3 oz)   Height: 5' 3" (1.6 m)         Physical Exam   Constitutional: She is oriented to person, place, and time. She appears well-developed and well-nourished.   HENT:   Head: Normocephalic.   Mouth/Throat: Oropharynx is clear and moist. No oropharyngeal exudate.   Eyes: Pupils are equal, round, and reactive to light. Conjunctivae and lids are normal. No scleral icterus.   Neck: Normal range of motion. Neck supple. No thyromegaly present.   Cardiovascular: Normal rate, regular rhythm and normal heart sounds.   No murmur heard.  Pulmonary/Chest: Effort normal and breath sounds normal. She has no wheezes. Right breast exhibits no inverted nipple, no mass, no nipple discharge and no skin change. Left breast exhibits no inverted nipple, no mass, no nipple discharge and no skin change.   Abdominal: Soft. Bowel sounds are normal. There is no tenderness. There is no rebound and no guarding.   Musculoskeletal: Normal range of motion. She exhibits no edema or tenderness.   Lymphadenopathy:     She has no cervical adenopathy.     She has axillary adenopathy.        Right: No supraclavicular adenopathy present.        Left: No supraclavicular adenopathy present.   Neurological: She is alert and oriented to person, place, and time. No cranial nerve deficit. Coordination normal.   Skin: Skin is warm and dry. No ecchymosis, no petechiae and no rash noted. No erythema.   Psychiatric: She has a normal mood and affect.             CT a/p w/contrast 11/29/2018   1. No acute abnormality identified within the abdomen and pelvis.    2.  There are a few nonspecific prominent lymph nodes in the upper abdomen including a periesophageal lymph node which measures 1.0 cm in short axis " diameter.    3.  Significant abdominal aortic atherosclerosis and abdominal aorta ectasia.    4.  Additional findings as above.    PET/CT 1/26/2018   1.  Intense FDG uptake within the known right infrahilar lung mass, compatible with malignancy.  It is unclear if this represents primary lung malignancy or metastatic breast cancer.    2.  Intensely hypermetabolic right axillary, retropectoral, and lower right cervical lymph nodes, compatible with metastases.    3.  Abnormal FDG uptake along right breast skin thickening, new from prior chest CTA and mammogram.  This may relate to localized edema/inflammation, though correlation with physical exam and mammography are recommended to exclude underlying inflammatory carcinoma.      MRI Brain w w/out contrast 2/9/2018  1.  No evidence of intracranial metastases.  2.  Sinus disease       Rt axillary LN bx at outside facility- on 1/16/2018 at [facility]  Pathology revealed metastatic poorly differentiated carcinoma of unknown primary  site 1/16/2018 at [facility]  TTF-1 negative, napsin negative  , cytokeratin 7 positive, cytokeratin 20 negative, p63 negative, cytokeratin 5/6 focal dim positivity    LUNG BIOPSY 1/31/2018  Pathology revealed benign lung tissue         SPECIMEN  1) Right Lung Bx 2/14/2018  Supplemental Diagnosis  Immunohistochemical stains show strong nuclear staining for p63 in essentially all tumor cells and very strong  cytoplasmic and membrane staining for CK5/6, also in essentially all tumor cells. TTF-1 and CK7 are negative  within tumor cells but do stain native pulmonary elements present within the biopsy. A stain for mucicarmine is  negative. Positive and negative controls function appropriately.  Final diagnosis: Specimen submitted as right lung biopsy  -Squamous cell carcinoma  (Electronically Signed: 2018-02-20 09:12:07 )  Diagnosed by: Phong Thompson  FINAL PATHOLOGIC DIAGNOSIS  Fragments of pulmonary parenchyma  (submitted as right lung biopsy):    FINAL PATHOLOGIC DIAGNOSIS 1. Lymph node, right axilla, biopsy, review of 10 outside slides [facility], JS 18 1 [id number],  collected January 11, 2018: Metastatic poorly differentiated carcinoma (see comment).  The histologic section shows fibrous stroma infiltrated by nest of poorly differentiated malignant cells including  occasional dyskeratotic cells morphologically suggestive of metastatic squamous cell carcinoma.  Immunohistochemical stains are nonspecific; the cells are positive for cytokeratin 7 and cytokeratin 5/6 (focal) and  negative for cytokeratin 20, TTF-1, p63, GCDFP, mammoglobin, and CEA        PET/CT 2/21/2019  Increased size and irregularity of the right axillary lymph node with increased FDG avidity.  Although this would be atypical in location for lung carcinoma, the increased size and avidity must be concerning for metastatic disease in this patient with history of squamous cell lung cancer.     US Rt breast and mammo 2/26/2019  Impression:  Right  Lymph Node: Right axilla lymph node. Assessment: 4 - Suspicious finding. Biopsy is recommended.      BI-RADS Category:   Right: 4 - Suspicious  Overall: 4 - Suspicious     Recommendation:  Biopsy is recommended. Biopsy of the lymph node measuring 1.4 x 1.1 x 1.3 recommended , the one with the round shape configuration, corresponding to the most recent PET CT finding.         Pathology 3/11/2019   FINAL PATHOLOGIC DIAGNOSIS  Right axilla, mass, core biopsy:  Positive for poorly differentiated carcinoma.  See comment.  Comment:  The biopsy from the right axilla mass shows a poorly differentiated carcinoma in background lymphoid tissue  with enlarged cells showing increased nuclear size, prominent nucleoli, moderate amounts of cytoplasm and mitotic  figures. Immunostains are completed and reveal the tumor cells to stain positively with cytokeratin AE 1/AE3, CK  5/6 and CK 7. The tumor cells are negative  for CK 20, Estrogen receptor, p63 and TTF-1. All stains have  satisfactory positive and negative controls. The patient's prior cases will be reviewed and an additional stain for  JUAREZ-3 is pending in an attempt to pinpoint the primary site of this malignancy and results will follow in a  supplemental report.      Supplemental Diagnosis  3/11/2019  The current axillary mass is compared to the patient's prior right lung biopsy (case number [id number]) and the  current axillary mass is morphologically similar to the lung malignancy. Also, the current axillary mass is compared  to the patient's prior breast resection (Case number [id number]) and appears similar as well. P63 is negative in the  [id number] tumor and CK 5/6 shows scattered positive staining in the [id number] tumor.  Immunostain for JUAREZ-3 is completed and shows only rare weak to moderate staining within the tumor cell nuclei  with satisfactory positive and negative controls. This stain is positive in the surrounding lymphocytes. This staining  pattern is non-specific and does not definitively differentiate between a lung and breast primary malignancy in this  right axilla mass biopsy. The staining profile of the current axillary mass match more closely with the previous  breast carcinoma (due to the p63 negativity in both the 2014 breast cancer and the current axillary mass lesion but  p63 positivity in the lung mass from 2018).  This staining profile, together with the comparison with the patient's prior breast tumor and prior lung tumor supports  a diagnosis of poorly differentiated carcinoma and the malignancy appears morphologically similar to the prior  malignancies from both sites, but the staining profile in this small sample from the axillary mass more closely  correlates with the prior breast malignancy. Pathologic subclassification of this malignancy's primary location is not  definitive and clinical correlation is recommended definitively decide on  possible primary location in this patient's  axillary mass sample.  Supplemental (2):  Additional immunohistochemical staining for progesterone receptor and HER2 are completed per clinician request  with following results:  Progesterone receptor: Negative; 0% nuclear tumor cell staining.  HER2: Negative; stain score = 0.  Supplemental (3):  [facility] DIAGNOSIS:  FINAL DIAGNOSIS  Breast, right, needle core biopsy ([id number]; 06/17/2014): Invasive ductal carcinoma, [place] grade III (of  III), with focal micropapillary features.  Immunohistochemical stains performed at the referring institution show that the tumor cells have the following  phenotype: - Estrogen receptor: Negative (0% tumor cells staining). - Progesterone receptor: Negative (0% tumor  cells staining). - HER2: Negative (score 0).  Lymph nodes, right, sentinel biopsy and lumpectomy ([id number]; 07/11/2014):  1. Right sentinel lymph node: A single (1) lymph node is negative for metastatic carcinoma.  Immunohistochemical stains performed by the referring institution and reviewed at [facility] for cytokeratin are  negative, confirming the diagnosis.  2. Right breast: Invasive ductal carcinoma with micropapillary features, per report 11.5 mm in greatest dimension.  Foci suspicious for lymphovascular invasion identified. Margins of resection are free of tumor.  Immunohistochemical stains performed by the referring institution and reviewed at [facility] show that the tumor  cells are negative for p63 and show patchy positivity for CK 5/6.  Lung, right, needle core biopsy ([id number]; 02/14/2018): Squamous cell carcinoma.    Immunohistochemical stains performed by the referring institution show the tumor cells are strongly positive for p63  and CK 5/6, while negative for TTF-1 and CK7. These result support the diagnosis. Axilla, right, needle core biopsy  ([id number]; 03/11/2019): Lymph node positive for metastatic carcinoma, most consistent with breast  primary  (see comment).  Immunohistochemical stains performed by the referring institution and reviewed at [facility] show that the tumor  cells are negative for p63, focally positive for CK 5/6, focally and weakly positive for JUAREZ-3, and strongly positive  for CK7. They are also negative for estrogen receptor, progesterone receptor, and HER2 JAM (score 0).  COMMENT:  Thank you for allowing me to review the case of this 73-year-old lady who recently underwent needle core biopsies  of a right axillary mass and who has a previous diagnosis of an invasive ductal carcinoma of the right breast, as well  as a squamous cell carcinoma of the lung. I am reviewing this case because of my special interest in breast  pathology.  I agree with your interpretation of this case. In my opinion, the lung tumor corresponds to a squamous cell  carcinoma and appears to be unrelated to the invasive ductal carcinoma of the breast. The axillary mass, in my  opinion, corresponds to metastases of the breast primary. Although it is a poorly differentiated carcinoma, the  immunophenotype is most consistent with the one that the breast primary exhibited, and is inconsistent with a  metastatic squamous cell carcinoma. I did share the case with Dr. Anabel Stone, one of our pulmonary pathologists,  and she concurs with this interpretation.  Note: Report attached.  Performing location:  [address] [address]    CT neck/chest/abd/pelvis w/contrast 4/26/2019   The previously described right hilar mass is no longer visible.  There is persistent volume loss in the right middle lobe this appears similar to the prior PET-CT February 21, 2019.    Persistent abnormal right axillary node today measuring about 15 mm compared 18 mm prior.  The positioning of the adjacent nodes is slightly different likely accounting for the slight difference in measurement.    Cluster of tree-in-bud nodular  densities left lower lobe series 2, image 93.  This may be related to small airways disease though serial follow-up suggested.      PET/CT 6/20/2019   New and worsening hypermetabolic lymph nodes concerning for metastatic breast cancer as described above.  Tissue sampling would be required for definitive diagnosis.      2-D echo 6/27/2019   · Normal left ventricular systolic function. The estimated ejection fraction is 55%  · Concentric left ventricular remodeling.  · Normal LV diastolic function.  · Normal right ventricular systolic function.  · Moderate left atrial enlargement.  · Mild right atrial enlargement.  · Mild aortic regurgitation.  · Mild mitral regurgitation.  · Mild tricuspid regurgitation.  · Normal central venous pressure (3 mm Hg).  · The estimated PA systolic pressure is 25 mm Hg    Assessment:       1. Recurrent breast cancer, unspecified laterality    2. Axillary lymphadenopathy    3. Chronic obstructive pulmonary disease with acute lower respiratory infection    4.  History of Squamous cell carcinoma of right lung    5. Antineoplastic chemotherapy induced anemia        Plan:   ECOG 0  1-5    Pt with hx of Stage 1A invasive ductal carcinoma rt breast s/p rt segmental mastectomy and SLN bx 7/11/2014 ER/NJ neg HER 2 jose maria neg [id number](i-).  S/p adjuvant RT completed 9/2014 Pt declined adjuvant chemo  Follow-up Mammo 6/12/2017 No mammographic evidence of malignancy.  Pt  hospitalized 11/2017 with acute-on-chronic  resp failure  Abnormal CT imaging revealing Large right hilar mass with involvement of  adjacent segmental pulmonary arterial branches and bronchi with associated postobstructive atelectasis  and involvement of mediastinal and axillary adenopathy   S/p rt axillary LN bx ( outside facility) - metastatic poorly differentiated carcinoma of unknown primary  site  status post  lung biopsy 1/31/2017 -benign lung tissue  PET/CT 1/26/2018   Intense FDG uptake within the known right infrahilar lung  mass, compatible with malignancy.   Intensely hypermetabolic right axillary, retropectoral, and lower right cervical lymph nodes, compatible with metastases.  Abnormal FDG uptake along right breast skin thickening, new from prior chest CTA and mammogram.    Repeat lung bx 2/14/2018 revealed Squamous Cell CA lung  PD-L1 10% low expression  EGFR NEG  ALK NEG    Pt treated for advanced squamous cell CA of lung   She s/p  cycle 6 of carboplatin/Abraxane Day1 completed 7/2/2018 ( day 15 held due to prolonged cytopenias)  She completed therapy with near CR     PET/CT 2/21/2019 showed increased size and irregularity of the right axillary lymph node with increased FDG avidity    Recent MAMMO/US rt breast reveals suspicious findings rt axilla LN.  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 It was determined The axillary mass, in my opinion, corresponded  to metastases of the breast primary. Although it is a poorly differentiated carcinoma, theimmunophenotype is most consistent with the one that the breast primary exhibited, and is inconsistent with a metastatic squamous cell carcinoma.     CT imaging 4/26/2019 persistent rt axillary LAD, no other sites of disease     Pf followed by Rad/Onc regarding RT   It was determined to discuss potential surgical intervention involving alnd  Plan to discuss with breast surgeon , Dr. Alexandra potential surgical intervention       Lymph node biopsy 3/11/2019 Right axilla, mass, core biopsy:  Positive for poorly differentiated carcinoma.   favor breast primary   Pt was discussed at multidisciplinary tumor  board    PET/CT 6/20/2019  New and worsening hypermetabolic lymph nodes concerning for metastatic breast cancer     Previously Discussed  imaging findings in detail with patient which reveals new and worsening hypermetabolic melena metabolic lymph nodes including hypermetabolic supraclavicular node.  Therefore, at treatment option will be systemic chemotherapy in light of the recent imaging findings.  She will be followed by her surgical oncologist Dr. Christopher        Hb 10.3g/dl -monitor    BSA 1.95     D/w Pathologist  D/w Dr. Alexandra  Pt presented at multidisciplinary conference      Plan AC [id number] x 3 wks x 4 wks  ( pt has not received in past)   2-D echo reviewed and LVEF 55%   Follow-up on  PD-L1 testing     Plan follow-up imaging following cycle 4     Cbc,cmp and  Mag prior to f/u 3 weeks     Advance Care Planning     Power of   I initiated the process of advance care planning today and explained the importance of this process to the patient.  I introduced the concept of advance directives to the patient, as well. Then the patient received detailed information about the importance of designating a Health Care Power of  (HCPOA). She was also instructed to communicate with this person about their wishes for future healthcare, should she become sick and lose decision-making capacity. The patient has previously appointed a HCPOA. After our discussion, the patient has decided to complete a HCPOA and has appointed her son and niece and  I spent a total time of 16 minutes discussing this issue with the patient.             Cc: Swetha Katz M.D.         MD Tory Roberson MD Raymond Gould, MD

## 2019-08-26 ENCOUNTER — TELEPHONE (OUTPATIENT)
Dept: HEMATOLOGY/ONCOLOGY | Facility: CLINIC | Age: 74
End: 2019-08-26

## 2019-08-26 DIAGNOSIS — C50.919 RECURRENT BREAST CANCER, UNSPECIFIED LATERALITY: Primary | ICD-10-CM

## 2019-08-26 DIAGNOSIS — M25.519 SHOULDER PAIN, UNSPECIFIED CHRONICITY, UNSPECIFIED LATERALITY: ICD-10-CM

## 2019-08-26 NOTE — TELEPHONE ENCOUNTER
Pt called, states you were supposed to give her something for pain at last visit, but didn't. Taking Tylenol, but it is not helping. States pain is in her back & shoulder.

## 2019-08-28 RX ORDER — HYDROCODONE BITARTRATE AND ACETAMINOPHEN 5; 325 MG/1; MG/1
1 TABLET ORAL EVERY 8 HOURS PRN
Qty: 60 TABLET | Refills: 0 | Status: SHIPPED | OUTPATIENT
Start: 2019-08-28 | End: 2019-09-27

## 2019-08-29 NOTE — TELEPHONE ENCOUNTER
Called pt & notified her that  sent a script for pain to the pharmacy for her, she voiced understanding.

## 2019-09-06 ENCOUNTER — INFUSION (OUTPATIENT)
Dept: INFUSION THERAPY | Facility: HOSPITAL | Age: 74
End: 2019-09-06
Attending: INTERNAL MEDICINE
Payer: MEDICARE

## 2019-09-06 ENCOUNTER — OFFICE VISIT (OUTPATIENT)
Dept: HEMATOLOGY/ONCOLOGY | Facility: CLINIC | Age: 74
End: 2019-09-06
Payer: MEDICARE

## 2019-09-06 ENCOUNTER — LAB VISIT (OUTPATIENT)
Dept: LAB | Facility: HOSPITAL | Age: 74
End: 2019-09-06
Attending: INTERNAL MEDICINE
Payer: MEDICARE

## 2019-09-06 VITALS
SYSTOLIC BLOOD PRESSURE: 106 MMHG | DIASTOLIC BLOOD PRESSURE: 56 MMHG | TEMPERATURE: 98 F | HEART RATE: 94 BPM | OXYGEN SATURATION: 100 % | RESPIRATION RATE: 18 BRPM

## 2019-09-06 VITALS
SYSTOLIC BLOOD PRESSURE: 147 MMHG | WEIGHT: 175.25 LBS | DIASTOLIC BLOOD PRESSURE: 70 MMHG | BODY MASS INDEX: 31.05 KG/M2 | HEIGHT: 63 IN | OXYGEN SATURATION: 95 % | HEART RATE: 63 BPM | TEMPERATURE: 98 F

## 2019-09-06 DIAGNOSIS — C34.91 SQUAMOUS CELL CARCINOMA OF RIGHT LUNG: ICD-10-CM

## 2019-09-06 DIAGNOSIS — R59.0 AXILLARY LYMPHADENOPATHY: ICD-10-CM

## 2019-09-06 DIAGNOSIS — C50.919 METASTATIC BREAST CANCER: ICD-10-CM

## 2019-09-06 DIAGNOSIS — C50.919 METASTATIC BREAST CANCER: Primary | ICD-10-CM

## 2019-09-06 DIAGNOSIS — C50.919 RECURRENT BREAST CANCER, UNSPECIFIED LATERALITY: Primary | ICD-10-CM

## 2019-09-06 DIAGNOSIS — C50.811 MALIGNANT NEOPLASM OF OVERLAPPING SITES OF RIGHT FEMALE BREAST, UNSPECIFIED ESTROGEN RECEPTOR STATUS: ICD-10-CM

## 2019-09-06 DIAGNOSIS — J44.0 CHRONIC OBSTRUCTIVE PULMONARY DISEASE WITH ACUTE LOWER RESPIRATORY INFECTION: ICD-10-CM

## 2019-09-06 DIAGNOSIS — C50.919 RECURRENT BREAST CANCER, UNSPECIFIED LATERALITY: ICD-10-CM

## 2019-09-06 LAB
ALBUMIN SERPL BCP-MCNC: 4.1 G/DL (ref 3.5–5.2)
ALP SERPL-CCNC: 47 U/L (ref 55–135)
ALT SERPL W/O P-5'-P-CCNC: 15 U/L (ref 10–44)
ANION GAP SERPL CALC-SCNC: 9 MMOL/L (ref 8–16)
AST SERPL-CCNC: 20 U/L (ref 10–40)
BASOPHILS # BLD AUTO: 0.07 K/UL (ref 0–0.2)
BASOPHILS NFR BLD: 2.1 % (ref 0–1.9)
BILIRUB SERPL-MCNC: 0.4 MG/DL (ref 0.1–1)
BUN SERPL-MCNC: 11 MG/DL (ref 8–23)
CALCIUM SERPL-MCNC: 9.2 MG/DL (ref 8.7–10.5)
CHLORIDE SERPL-SCNC: 106 MMOL/L (ref 95–110)
CO2 SERPL-SCNC: 27 MMOL/L (ref 23–29)
CREAT SERPL-MCNC: 0.8 MG/DL (ref 0.5–1.4)
DIFFERENTIAL METHOD: ABNORMAL
EOSINOPHIL # BLD AUTO: 0 K/UL (ref 0–0.5)
EOSINOPHIL NFR BLD: 0.3 % (ref 0–8)
ERYTHROCYTE [DISTWIDTH] IN BLOOD BY AUTOMATED COUNT: 20 % (ref 11.5–14.5)
EST. GFR  (AFRICAN AMERICAN): >60 ML/MIN/1.73 M^2
EST. GFR  (NON AFRICAN AMERICAN): >60 ML/MIN/1.73 M^2
GLUCOSE SERPL-MCNC: 138 MG/DL (ref 70–110)
HCT VFR BLD AUTO: 33.3 % (ref 37–48.5)
HGB BLD-MCNC: 10.5 G/DL (ref 12–16)
LYMPHOCYTES # BLD AUTO: 0.6 K/UL (ref 1–4.8)
LYMPHOCYTES NFR BLD: 18.7 % (ref 18–48)
MAGNESIUM SERPL-MCNC: 1.8 MG/DL (ref 1.6–2.6)
MCH RBC QN AUTO: 31.1 PG (ref 27–31)
MCHC RBC AUTO-ENTMCNC: 31.5 G/DL (ref 32–36)
MCV RBC AUTO: 99 FL (ref 82–98)
MONOCYTES # BLD AUTO: 0.8 K/UL (ref 0.3–1)
MONOCYTES NFR BLD: 24.4 % (ref 4–15)
NEUTROPHILS # BLD AUTO: 1.8 K/UL (ref 1.8–7.7)
NEUTROPHILS NFR BLD: 54.5 % (ref 38–73)
PLATELET # BLD AUTO: 355 K/UL (ref 150–350)
PMV BLD AUTO: 8.9 FL (ref 9.2–12.9)
POTASSIUM SERPL-SCNC: 3.6 MMOL/L (ref 3.5–5.1)
PROT SERPL-MCNC: 6.6 G/DL (ref 6–8.4)
RBC # BLD AUTO: 3.38 M/UL (ref 4–5.4)
SODIUM SERPL-SCNC: 142 MMOL/L (ref 136–145)
WBC # BLD AUTO: 3.32 K/UL (ref 3.9–12.7)

## 2019-09-06 PROCEDURE — 96413 CHEMO IV INFUSION 1 HR: CPT | Mod: HCNC

## 2019-09-06 PROCEDURE — 83735 ASSAY OF MAGNESIUM: CPT | Mod: HCNC

## 2019-09-06 PROCEDURE — 80053 COMPREHEN METABOLIC PANEL: CPT | Mod: HCNC

## 2019-09-06 PROCEDURE — 99214 OFFICE O/P EST MOD 30 MIN: CPT | Mod: HCNC,S$GLB,, | Performed by: INTERNAL MEDICINE

## 2019-09-06 PROCEDURE — 99999 PR PBB SHADOW E&M-EST. PATIENT-LVL IV: CPT | Mod: PBBFAC,HCNC,, | Performed by: INTERNAL MEDICINE

## 2019-09-06 PROCEDURE — 36415 COLL VENOUS BLD VENIPUNCTURE: CPT | Mod: HCNC

## 2019-09-06 PROCEDURE — 1101F PT FALLS ASSESS-DOCD LE1/YR: CPT | Mod: HCNC,CPTII,S$GLB, | Performed by: INTERNAL MEDICINE

## 2019-09-06 PROCEDURE — 96367 TX/PROPH/DG ADDL SEQ IV INF: CPT | Mod: HCNC

## 2019-09-06 PROCEDURE — 96411 CHEMO IV PUSH ADDL DRUG: CPT | Mod: HCNC

## 2019-09-06 PROCEDURE — 96377 APPLICATON ON-BODY INJECTOR: CPT | Mod: HCNC,59

## 2019-09-06 PROCEDURE — 99999 PR PBB SHADOW E&M-EST. PATIENT-LVL IV: ICD-10-PCS | Mod: PBBFAC,HCNC,, | Performed by: INTERNAL MEDICINE

## 2019-09-06 PROCEDURE — 99499 UNLISTED E&M SERVICE: CPT | Mod: HCNC,S$GLB,, | Performed by: INTERNAL MEDICINE

## 2019-09-06 PROCEDURE — 1101F PR PT FALLS ASSESS DOC 0-1 FALLS W/OUT INJ PAST YR: ICD-10-PCS | Mod: HCNC,CPTII,S$GLB, | Performed by: INTERNAL MEDICINE

## 2019-09-06 PROCEDURE — 99214 PR OFFICE/OUTPT VISIT, EST, LEVL IV, 30-39 MIN: ICD-10-PCS | Mod: HCNC,S$GLB,, | Performed by: INTERNAL MEDICINE

## 2019-09-06 PROCEDURE — 63600175 PHARM REV CODE 636 W HCPCS: Mod: TB,HCNC | Performed by: INTERNAL MEDICINE

## 2019-09-06 PROCEDURE — 99499 RISK ADDL DX/OHS AUDIT: ICD-10-PCS | Mod: HCNC,S$GLB,, | Performed by: INTERNAL MEDICINE

## 2019-09-06 PROCEDURE — 85025 COMPLETE CBC W/AUTO DIFF WBC: CPT | Mod: HCNC

## 2019-09-06 RX ORDER — HEPARIN 100 UNIT/ML
500 SYRINGE INTRAVENOUS
Status: DISCONTINUED | OUTPATIENT
Start: 2019-09-06 | End: 2019-09-06 | Stop reason: HOSPADM

## 2019-09-06 RX ORDER — SODIUM CHLORIDE 0.9 % (FLUSH) 0.9 %
10 SYRINGE (ML) INJECTION
Status: CANCELLED | OUTPATIENT
Start: 2019-09-06

## 2019-09-06 RX ORDER — HEPARIN 100 UNIT/ML
500 SYRINGE INTRAVENOUS
Status: CANCELLED | OUTPATIENT
Start: 2019-09-06

## 2019-09-06 RX ORDER — DOXORUBICIN HYDROCHLORIDE 2 MG/ML
105 INJECTION, SOLUTION INTRAVENOUS
Status: CANCELLED | OUTPATIENT
Start: 2019-09-06

## 2019-09-06 RX ORDER — DOXORUBICIN HYDROCHLORIDE 2 MG/ML
105 INJECTION, SOLUTION INTRAVENOUS
Status: COMPLETED | OUTPATIENT
Start: 2019-09-06 | End: 2019-09-06

## 2019-09-06 RX ORDER — SODIUM CHLORIDE 0.9 % (FLUSH) 0.9 %
10 SYRINGE (ML) INJECTION
Status: DISCONTINUED | OUTPATIENT
Start: 2019-09-06 | End: 2019-09-06 | Stop reason: HOSPADM

## 2019-09-06 RX ADMIN — APREPITANT 130 MG: 130 INJECTION, EMULSION INTRAVENOUS at 09:09

## 2019-09-06 RX ADMIN — CYCLOPHOSPHAMIDE 1055 MG: 1 INJECTION, POWDER, FOR SOLUTION INTRAVENOUS; ORAL at 10:09

## 2019-09-06 RX ADMIN — PALONOSETRON HYDROCHLORIDE: 0.25 INJECTION, SOLUTION INTRAVENOUS at 09:09

## 2019-09-06 RX ADMIN — HEPARIN 500 UNITS: 100 SYRINGE at 11:09

## 2019-09-06 RX ADMIN — PEGFILGRASTIM 6 MG: KIT SUBCUTANEOUS at 11:09

## 2019-09-06 RX ADMIN — DOXORUBICIN HYDROCHLORIDE 106 MG: 2 INJECTION, SOLUTION INTRAVENOUS at 10:09

## 2019-09-06 NOTE — PLAN OF CARE
Problem: Adult Inpatient Plan of Care  Goal: Plan of Care Review  Outcome: Ongoing (interventions implemented as appropriate)  Arrived to unit for AC 4/4. VSS. Pt denies side effects from chemo. Some fatigue. Blood return noted before and after Adriamycin. Tolerated Cytoxan. No reactions noted. Pt has next appts. Pt ambulated off unit, no distress noted.

## 2019-09-06 NOTE — PROGRESS NOTES
Subjective:       Patient ID: Ade Castellano is a 73 y.o. female.    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   Pathology showed a 1.15 cm, grade 3 infiltrating ductal carcinoma.  One sentinel lymph node was negative for malignancy.  Margins were negative and the closest margin was 1 cm.  She was staged a little pT1c little pN0 cM0 stage IA.  She declined adjuvant chemo therapy.  She completed post operative radiation therapy at 400 cGy per fraction to 4000 cGy 9/2014         Pt hospitalized 11/2017   Ms. Castellano was admitted for acute on chronic respiratory failure requiring intubation and ventilation. Has large lung mass in right lung with probable post obstructive pneumonia resulting in COPD exacerbation. But also, patient had ran out of home O2 prior to onset of symptoms, likely contributing to presentation. Initiated on broad spectrum abx, IV steroids, duo-nebs and pulmonology consulted for ventilator co-management. Successfully extubated to BiPAP on 11/30. Then de-escalated to low flow nasal cannula. Abx tailored to augmentin for broad coverage, including anaerobic bacteria /     CTA chest 11/29/2017 revealed   Large right hilar mass with involvement of adjacent segmental pulmonary arterial branches and bronchi with associated postobstructive atelectasis and volume loss in the right middle lobe with adjacent ground glass opacity.  Findings highly concerning for underlying neoplastic process. Mediastinal and axillary adenopathy, with a right axillary lymph node measuring up to 2.0 cm in short axis diameter. No definite evidence of pulmonary thromboembolism.    She is followed by Pulmonology   She underwent rt axillary LN bx at  outside facility- on 1/16/2018 at [facility]  Pathology revealed metastatic poorly differentiated carcinoma of unknown primary site  TTF-1 negative, napsin negative  , cytokeratin 7 positive, cytokeratin 20 negative, p63 negative, cytokeratin 5/6 focal dim positivity    She next underwent lung bx at [facility] l1/31/2018   Pathology revealed benign lung tissue    She underwent Repeat Right Lung Bx 2/14/2018 Pathology reveals Squamous cell carcinoma  PD-L1 10% low expression  EGFR NEG  ALK NEG  BRAF NEG      Outside slides axillary LN specimen  requested for review/comparison to lung bx findings     1) Right Lung Bx 2/14/2018  Supplemental Diagnosis  Immunohistochemical stains show strong nuclear staining for p63 in essentially all tumor cells and very strong  cytoplasmic and membrane staining for CK5/6, also in essentially all tumor cells. TTF-1 and CK7 are negative  within tumor cells but do stain native pulmonary elements present within the biopsy. A stain for mucicarmine is  negative. Positive and negative controls function appropriately.  Final diagnosis: Specimen submitted as right lung biopsy  -Squamous cell carcinoma  (Electronically Signed: 2018-02-20 09:12:07 )  Diagnosed by: Phong Thompson  FINAL PATHOLOGIC DIAGNOSIS  Fragments of pulmonary parenchyma (submitted as right lung biopsy):    FINAL PATHOLOGIC DIAGNOSIS 1. Lymph node, right axilla, biopsy, review of 10 outside slides [facility], JS [id number],  collected January 11, 2018: Metastatic poorly differentiated carcinoma (see comment).  The histologic section shows fibrous stroma infiltrated by nest of poorly differentiated malignant cells including  occasional dyskeratotic cells morphologically suggestive of metastatic squamous cell carcinoma.  Immunohistochemical stains are nonspecific; the cells are positive for cytokeratin 7 and cytokeratin 5/6 (focal) and  negative for cytokeratin 20, TTF-1, p63, GCDFP, mammoglobin, and  CEA      PET/CT  4/19/2018 revealed there has been a excellent response to therapy.  At least 90% reduction in malignant activity.    She s/p  cycle 6 of carboplatin/Abraxane completed 7/2018        PET/CT 2/21/2019 increased size and irregularity of the right axillary lymph node with increased FDG avidity     MAMMO/US rt breast 2/2019 revealed suspicious findings rt axilla LN.  Biopsy of the lymph node measuring 1.4 x 1.1 x 1.3 recommended    Pathology 3/11/2019   FINAL PATHOLOGIC DIAGNOSIS  Right axilla, mass, core biopsy:  Positive for poorly differentiated carcinoma.  he staining profile of the current axillary mass match more closely with the previous  breast carcinoma (due to the p63 negativity in both the 2014 breast cancer and the current axillary mass lesion but  p63 positivity in the lung mass from 2018).  This staining profile, together with the comparison with the patient's prior breast tumor and prior lung tumor supports  a diagnosis of poorly differentiated carcinoma and the malignancy appears morphologically similar to the prior  malignancies from both sites, but the staining profile in this small sample from the axillary mass more closely  correlates with the prior breast malignancy. Pathologic subclassification of this malignancy's primary location is not  definitive and clinical correlation is recommended definitively decide on possible primary location in this patient's  axillary mass sample.  Supplemental (2):  Additional immunohistochemical staining for progesterone receptor and HER2 are completed per clinician request  with following results:  Progesterone receptor: Negative; 0% nuclear tumor cell staining.  HER2: Negative; stain score = 0.  Supplemental (3):    Slides sent to [facility] for outside review  It was determined that agree with  interpretation of this case. In my opinion, the lung tumor corresponds to a squamous cell  carcinoma and appears to be unrelated to the invasive ductal  carcinoma of the breast. The axillary mass, in my  opinion, corresponds to metastases of the breast primary. Although it is a poorly differentiated carcinoma, the  immunophenotype is most consistent with the one that the breast primary exhibited, and is inconsistent with a  metastatic squamous cell carcinoma. I      Further testing sent for cancer type ID also sent and results have revealed molecular diagnosis testing revealed head and neck salivary gland carcinoma probability 84% breast adenocarcinoma could not be excluded with a 12% probability      She is s/p AC q21d x 3 cycles   Doing great   No fatigue  No N/V  Appetite stable  She has diffuse arthralgias-stable  No  swelling in rt arm    No hemoptysis   No bleeding-urinary/rectal/nasal       This is a very pleasant 73-year-old female who was placed in observation for neutropenic fever.  Patient is a cancer patient and receives chemo every 3 weeks last dose was July 2, 2019.  She tells me that she became weak and fatigued about 2 days ago she was found to have fever of 102.2 of unclear etiology at the time of presentation.  Her blood cultures are pending she is on dual antibiotics vanc and Zosyn.  She is neutropenic with the absolute neutrophil count of 946.  The patient denies any nausea vomiting it at home, sick contacts however her blood pressure has been running low while in the hospital last 87/44.  She has received multiple boluses in the emergency room and received another bolus this morning 500 which improved her blood pressure from 89/51 to 110/51.  Her Home blood pressure meds are being held secondary to hypotension.  She remained ill appearing, last low-grade temp was 7:00 a.m. this morning at 99.5, at 5:00 a.m. was 100.  Her CT head and chest x-ray are non revealing, and urinalysis unimpressive.  At the time of presentation her lactic acid was 3.  Blood and urine cultures did not reveal any growth.          PrevHx:She had an abnormal mammogram  "6/4/2014 which  Revealed a round solid mass 6mm in rt breast.  She then underwent U/S guided core bx of rt breast mass on 6/17/2014.  Pathology revealed infiltrating ductal carcinoma Grade 3 with tumor  Present in thin-walled spaces suggestive of lymphatic spaces. Hormone  Receptor status on tumor specimen revealed ER negative 0% ,  CO negative 0% and Her 2 jose maria negative.  She subsequently underwent rt segmental mastectomy and SLN bx  On 7/11/2014. Pathology of rt breast lumpectomy revealed invasive  Ductal carcinoma with micropapillary pattern ( Invasive micropapillary  Ca) with max tumor dimension 11.5mm with suggestion of tumor in   Thin walled spaces c/w lymphovascular involvement. Surgical  Margins free of tumor, grade 3, ER/CO neg , Her 2 jose maria neg   With Platter Lymph node negative for Neoplasm.   Pathologic staging [id number](i-)  Her mother was diagnosed with breast cancer in her 50's/  She has no family history of ovarian cancer.           Review of Systems   Constitutional: Positive for fatigue. Negative for appetite change, fever and unexpected weight change.   HENT: Negative for mouth sores and rhinorrhea.    Eyes: Negative for visual disturbance.   Respiratory: Positive for cough and shortness of breath (mild).    Cardiovascular: Negative for chest pain.   Gastrointestinal: Negative for abdominal pain and diarrhea.   Genitourinary: Negative for frequency.   Musculoskeletal: Positive for arthralgias. Negative for back pain.   Skin: Negative for rash.   Neurological: Negative for dizziness and headaches.   Hematological: Negative for adenopathy.   Psychiatric/Behavioral: The patient is nervous/anxious.        Objective:        Vitals:    09/06/19 0804   BP: (!) 147/70   BP Location: Left arm   Patient Position: Sitting   BP Method: Medium (Automatic)   Pulse: 63   Temp: 97.9 °F (36.6 °C)   TempSrc: Oral   SpO2: 95%   Weight: 79.5 kg (175 lb 4.3 oz)   Height: 5' 3" (1.6 m)         Physical Exam "   Constitutional: She is oriented to person, place, and time. She appears well-developed and well-nourished.   HENT:   Head: Normocephalic.   Mouth/Throat: Oropharynx is clear and moist. No oropharyngeal exudate.   Eyes: Pupils are equal, round, and reactive to light. Conjunctivae and lids are normal. No scleral icterus.   Neck: Normal range of motion. Neck supple. No thyromegaly present.   Cardiovascular: Normal rate, regular rhythm and normal heart sounds.   No murmur heard.  Pulmonary/Chest: Effort normal and breath sounds normal. She has no wheezes. Right breast exhibits no inverted nipple, no mass, no nipple discharge and no skin change. Left breast exhibits no inverted nipple, no mass, no nipple discharge and no skin change.   Abdominal: Soft. Bowel sounds are normal. There is no tenderness. There is no rebound and no guarding.   Musculoskeletal: Normal range of motion. She exhibits no edema or tenderness.   Lymphadenopathy:     She has no cervical adenopathy.     She has no axillary adenopathy.        Right: No supraclavicular adenopathy present.        Left: No supraclavicular adenopathy present.   Neurological: She is alert and oriented to person, place, and time. No cranial nerve deficit. Coordination normal.   Skin: Skin is warm and dry. No ecchymosis, no petechiae and no rash noted. No erythema.   Psychiatric: She has a normal mood and affect.             CT a/p w/contrast 11/29/2018   1. No acute abnormality identified within the abdomen and pelvis.    2.  There are a few nonspecific prominent lymph nodes in the upper abdomen including a periesophageal lymph node which measures 1.0 cm in short axis diameter.    3.  Significant abdominal aortic atherosclerosis and abdominal aorta ectasia.    4.  Additional findings as above.    PET/CT 1/26/2018   1.  Intense FDG uptake within the known right infrahilar lung mass, compatible with malignancy.  It is unclear if this represents primary lung malignancy or  metastatic breast cancer.    2.  Intensely hypermetabolic right axillary, retropectoral, and lower right cervical lymph nodes, compatible with metastases.    3.  Abnormal FDG uptake along right breast skin thickening, new from prior chest CTA and mammogram.  This may relate to localized edema/inflammation, though correlation with physical exam and mammography are recommended to exclude underlying inflammatory carcinoma.      MRI Brain w w/out contrast 2/9/2018  1.  No evidence of intracranial metastases.  2.  Sinus disease       Rt axillary LN bx at outside facility- on 1/16/2018 at [facility]  Pathology revealed metastatic poorly differentiated carcinoma of unknown primary  site 1/16/2018 at [facility]  TTF-1 negative, napsin negative  , cytokeratin 7 positive, cytokeratin 20 negative, p63 negative, cytokeratin 5/6 focal dim positivity    LUNG BIOPSY 1/31/2018  Pathology revealed benign lung tissue         SPECIMEN  1) Right Lung Bx 2/14/2018  Supplemental Diagnosis  Immunohistochemical stains show strong nuclear staining for p63 in essentially all tumor cells and very strong  cytoplasmic and membrane staining for CK5/6, also in essentially all tumor cells. TTF-1 and CK7 are negative  within tumor cells but do stain native pulmonary elements present within the biopsy. A stain for mucicarmine is  negative. Positive and negative controls function appropriately.  Final diagnosis: Specimen submitted as right lung biopsy  -Squamous cell carcinoma  (Electronically Signed: 2018-02-20 09:12:07 )  Diagnosed by: Phong Thompson  FINAL PATHOLOGIC DIAGNOSIS  Fragments of pulmonary parenchyma (submitted as right lung biopsy):    FINAL PATHOLOGIC DIAGNOSIS 1. Lymph node, right axilla, biopsy, review of 10 outside slides [facility], JS 18 1 [phone],  collected January 11, 2018: Metastatic poorly differentiated carcinoma (see comment).  The histologic section shows fibrous stroma  infiltrated by nest of poorly differentiated malignant cells including  occasional dyskeratotic cells morphologically suggestive of metastatic squamous cell carcinoma.  Immunohistochemical stains are nonspecific; the cells are positive for cytokeratin 7 and cytokeratin 5/6 (focal) and  negative for cytokeratin 20, TTF-1, p63, GCDFP, mammoglobin, and CEA        PET/CT 2/21/2019  Increased size and irregularity of the right axillary lymph node with increased FDG avidity.  Although this would be atypical in location for lung carcinoma, the increased size and avidity must be concerning for metastatic disease in this patient with history of squamous cell lung cancer.     US Rt breast and mammo 2/26/2019  Impression:  Right  Lymph Node: Right axilla lymph node. Assessment: 4 - Suspicious finding. Biopsy is recommended.      BI-RADS Category:   Right: 4 - Suspicious  Overall: 4 - Suspicious     Recommendation:  Biopsy is recommended. Biopsy of the lymph node measuring 1.4 x 1.1 x 1.3 recommended , the one with the round shape configuration, corresponding to the most recent PET CT finding.         Pathology 3/11/2019   FINAL PATHOLOGIC DIAGNOSIS  Right axilla, mass, core biopsy:  Positive for poorly differentiated carcinoma.  See comment.  Comment:  The biopsy from the right axilla mass shows a poorly differentiated carcinoma in background lymphoid tissue  with enlarged cells showing increased nuclear size, prominent nucleoli, moderate amounts of cytoplasm and mitotic  figures. Immunostains are completed and reveal the tumor cells to stain positively with cytokeratin AE 1/AE3, CK  5/6 and CK 7. The tumor cells are negative for CK 20, Estrogen receptor, p63 and TTF-1. All stains have  satisfactory positive and negative controls. The patient's prior cases will be reviewed and an additional stain for  JUAREZ-3 is pending in an attempt to pinpoint the primary site of this malignancy and results will follow in a  supplemental  report.      Supplemental Diagnosis  3/11/2019  The current axillary mass is compared to the patient's prior right lung biopsy (case number [id number]) and the  current axillary mass is morphologically similar to the lung malignancy. Also, the current axillary mass is compared  to the patient's prior breast resection (Case number [id number]) and appears similar as well. P63 is negative in the  [id number] tumor and CK 5/6 shows scattered positive staining in the [id number] tumor.  Immunostain for JUAREZ-3 is completed and shows only rare weak to moderate staining within the tumor cell nuclei  with satisfactory positive and negative controls. This stain is positive in the surrounding lymphocytes. This staining  pattern is non-specific and does not definitively differentiate between a lung and breast primary malignancy in this  right axilla mass biopsy. The staining profile of the current axillary mass match more closely with the previous  breast carcinoma (due to the p63 negativity in both the 2014 breast cancer and the current axillary mass lesion but  p63 positivity in the lung mass from 2018).  This staining profile, together with the comparison with the patient's prior breast tumor and prior lung tumor supports  a diagnosis of poorly differentiated carcinoma and the malignancy appears morphologically similar to the prior  malignancies from both sites, but the staining profile in this small sample from the axillary mass more closely  correlates with the prior breast malignancy. Pathologic subclassification of this malignancy's primary location is not  definitive and clinical correlation is recommended definitively decide on possible primary location in this patient's  axillary mass sample.  Supplemental (2):  Additional immunohistochemical staining for progesterone receptor and HER2 are completed per clinician request  with following results:  Progesterone receptor: Negative; 0% nuclear tumor cell staining.  HER2:  Negative; stain score = 0.  Supplemental (3):  [place] DIAGNOSIS:  FINAL DIAGNOSIS  Breast, right, needle core biopsy ([id number]; 06/17/2014): Invasive ductal carcinoma, [place] grade III (of  III), with focal micropapillary features.  Immunohistochemical stains performed at the referring institution show that the tumor cells have the following  phenotype: - Estrogen receptor: Negative (0% tumor cells staining). - Progesterone receptor: Negative (0% tumor  cells staining). - HER2: Negative (score 0).  Lymph nodes, right, sentinel biopsy and lumpectomy ([id number]; 07/11/2014):  1. Right sentinel lymph node: A single (1) lymph node is negative for metastatic carcinoma.  Immunohistochemical stains performed by the referring institution and reviewed at [facility] for cytokeratin are  negative, confirming the diagnosis.  2. Right breast: Invasive ductal carcinoma with micropapillary features, per report 11.5 mm in greatest dimension.  Foci suspicious for lymphovascular invasion identified. Margins of resection are free of tumor.  Immunohistochemical stains performed by the referring institution and reviewed at [facility] show that the tumor  cells are negative for p63 and show patchy positivity for CK 5/6.  Lung, right, needle core biopsy ([id number]; 02/14/2018): Squamous cell carcinoma.    Immunohistochemical stains performed by the referring institution show the tumor cells are strongly positive for p63  and CK 5/6, while negative for TTF-1 and CK7. These result support the diagnosis. Axilla, right, needle core biopsy  ([id number]; 03/11/2019): Lymph node positive for metastatic carcinoma, most consistent with breast primary  (see comment).  Immunohistochemical stains performed by the referring institution and reviewed at [facility] show that the tumor  cells are negative for p63, focally positive for CK 5/6, focally and weakly positive for JUAREZ-3, and strongly positive  for CK7. They are also negative for  estrogen receptor, progesterone receptor, and HER2 JAM (score 0).  COMMENT:  Thank you for allowing me to review the case of this 73-year-old lady who recently underwent needle core biopsies  of a right axillary mass and who has a previous diagnosis of an invasive ductal carcinoma of the right breast, as well  as a squamous cell carcinoma of the lung. I am reviewing this case because of my special interest in breast  pathology.  I agree with your interpretation of this case. In my opinion, the lung tumor corresponds to a squamous cell  carcinoma and appears to be unrelated to the invasive ductal carcinoma of the breast. The axillary mass, in my  opinion, corresponds to metastases of the breast primary. Although it is a poorly differentiated carcinoma, the  immunophenotype is most consistent with the one that the breast primary exhibited, and is inconsistent with a  metastatic squamous cell carcinoma. I did share the case with Dr. Anabel Stone, one of our pulmonary pathologists,  and she concurs with this interpretation.  Note: Report attached.  Performing location:  [place] [address]    CT neck/chest/abd/pelvis w/contrast 4/26/2019   The previously described right hilar mass is no longer visible.  There is persistent volume loss in the right middle lobe this appears similar to the prior PET-CT February 21, 2019.    Persistent abnormal right axillary node today measuring about 15 mm compared 18 mm prior.  The positioning of the adjacent nodes is slightly different likely accounting for the slight difference in measurement.    Cluster of tree-in-bud nodular densities left lower lobe series 2, image 93.  This may be related to small airways disease though serial follow-up suggested.      PET/CT 6/20/2019   New and worsening hypermetabolic lymph nodes concerning for metastatic breast cancer as described above.  Tissue sampling would be required for  definitive diagnosis.      2-D echo 6/27/2019   · Normal left ventricular systolic function. The estimated ejection fraction is 55%  · Concentric left ventricular remodeling.  · Normal LV diastolic function.  · Normal right ventricular systolic function.  · Moderate left atrial enlargement.  · Mild right atrial enlargement.  · Mild aortic regurgitation.  · Mild mitral regurgitation.  · Mild tricuspid regurgitation.  · Normal central venous pressure (3 mm Hg).  · The estimated PA systolic pressure is 25 mm Hg    Assessment:       1. Recurrent breast cancer, unspecified laterality    2. Axillary lymphadenopathy    3. Malignant neoplasm of overlapping sites of right female breast, unspecified estrogen receptor status     4.  History of Squamous cell carcinoma of right lung    5. Chronic obstructive pulmonary disease with acute lower respiratory infection        Plan:   ECOG 0  1-5    Pt with hx of Stage 1A invasive ductal carcinoma rt breast s/p rt segmental mastectomy and SLN bx 7/11/2014 ER/[place] neg HER 2 jose maria neg [id number](i-).  S/p adjuvant RT completed 9/2014 Pt declined adjuvant chemo  Follow-up Mammo 6/12/2017 No mammographic evidence of malignancy.  Pt  hospitalized 11/2017 with acute-on-chronic  resp failure  Abnormal CT imaging revealing Large right hilar mass with involvement of  adjacent segmental pulmonary arterial branches and bronchi with associated postobstructive atelectasis  and involvement of mediastinal and axillary adenopathy   S/p rt axillary LN bx ( outside facility) - metastatic poorly differentiated carcinoma of unknown primary  site  status post  lung biopsy 1/31/2017 -benign lung tissue  PET/CT 1/26/2018   Intense FDG uptake within the known right infrahilar lung mass, compatible with malignancy.   Intensely hypermetabolic right axillary, retropectoral, and lower right cervical lymph nodes, compatible with metastases.  Abnormal FDG uptake along right breast skin thickening, new from prior chest  CTA and mammogram.    Repeat lung bx 2/14/2018 revealed Squamous Cell CA lung  PD-L1 10% low expression  EGFR NEG  ALK NEG    Pt treated for advanced squamous cell CA of lung   She s/p  cycle 6 of carboplatin/Abraxane Day1 completed 7/2/2018 ( day 15 held due to prolonged cytopenias)  She completed therapy with near CR     PET/CT 2/21/2019 showed increased size and irregularity of the right axillary lymph node with increased FDG avidity    Recent MAMMO/US rt breast reveals suspicious findings rt axilla LN.  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 It was determined The axillary mass, in my opinion, corresponded  to metastases of the breast primary. Although it is a poorly differentiated carcinoma, theimmunophenotype is most consistent with the one that the breast primary exhibited, and is inconsistent with a metastatic squamous cell carcinoma.     CT imaging 4/26/2019 persistent rt axillary LAD, no other sites of disease     Pf followed by Rad/Onc regarding RT   It was determined to discuss potential surgical intervention involving alnd  Plan to discuss with breast surgeon , Dr. Alexandra potential surgical intervention       Lymph node biopsy 3/11/2019 Right axilla, mass, core biopsy:  Positive for poorly differentiated carcinoma.   favor breast primary   Pt was discussed at multidisciplinary tumor board    PET/CT 6/20/2019  New and worsening hypermetabolic lymph nodes concerning for metastatic breast cancer     Previously Discussed  imaging findings in detail with patient which reveals new and worsening hypermetabolic melena metabolic  lymph nodes including hypermetabolic supraclavicular node.  Therefore, at treatment option will be systemic chemotherapy in light of the recent imaging findings.  She will be followed by her surgical oncologist Dr. Christopher        Hb 10.5g/dl -monitor    BSA 1.95     D/w Pathologist  D/w Dr. Alexandra  Pt presented at multidisciplinary conference      Plan AC [id number] x 3 wks x 4 wks  ( pt has not received in past)   2-D echo reviewed and LVEF 55%   Follow-up on  PD-L1 testing     Proceed with cycle 4  Pt tolerating AC chemo well   Plan follow-up imaging following cycle 4     Cbc,cmp and  Mag prior to f/u 3 weeks     Advance Care Planning     Power of   I initiated the process of advance care planning today and explained the importance of this process to the patient.  I introduced the concept of advance directives to the patient, as well. Then the patient received detailed information about the importance of designating a Health Care Power of  (HCPOA). She was also instructed to communicate with this person about their wishes for future healthcare, should she become sick and lose decision-making capacity. The patient has previously appointed a HCPOA. After our discussion, the patient has decided to complete a HCPOA and has appointed her son and niece and  I spent a total time of 16 minutes discussing this issue with the patient.             Cc: Swetha Katz M.D.         MD Tory Roberson MD Raymond Gould, MD

## 2019-09-09 RX ORDER — ROSUVASTATIN CALCIUM 20 MG/1
TABLET, COATED ORAL
Qty: 30 TABLET | Refills: 3 | Status: SHIPPED | OUTPATIENT
Start: 2019-09-09 | End: 2020-01-23 | Stop reason: SDUPTHER

## 2019-09-09 RX ORDER — ISOSORBIDE MONONITRATE 60 MG/1
60 TABLET, EXTENDED RELEASE ORAL DAILY
Qty: 30 TABLET | Refills: 6 | Status: CANCELLED | OUTPATIENT
Start: 2019-09-09

## 2019-09-10 NOTE — PROGRESS NOTES
Bedside shift change report given to Mingo Gauthier RN (oncoming nurse) by Hilda Edwards RN (offgoing nurse). Report included the following information SBAR, Kardex, Intake/Output, Recent Results and Quality Measures. Pt informed ANC is 600. Chemo to be held this week per Dr. Holliday. Proceed with PET scan on Thrus and see Dr. Holliday next week as scheduled.

## 2019-09-17 NOTE — PROGRESS NOTES
New Breast Cancer  History and Physical  [facility]  Department of Surgery    REFERRING PROVIDER: No referring provider defined for this encounter.    CHIEF COMPLAINT: history of right breast cancer now with lung primary and new axillary adenopathy Right    Subjective:      Ade Castellano is a 73 y.o. postmenopausal female referred for evaluation of new axillary right adenopathy. History of RIGHT breast cancer in  treated with lumpectomy/SLNB/XRT.  Triple negative.  No chemo. No endocrine therapy.    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  Presents to my clinic with right adenopathy with biopsy showing poorly differentiated carcinoma.     Here to discuss options.    Patient does routinely do self breast exams.  Patient has not noted a change on breast exam.  Patient denies nipple discharge. Patient reports to previous breast biopsy. Patient reports a personal history of breast cancer.      INTERVAL:  Has received chemo for lung cancer with good response.  Now with new axillary mass.  No chemo since 2018.    2019 new mass axilla right.  Biopsy showed poorly differentiated carcinoma.   Discussed at tumor board 2019 and repeat PET recommended which showed increased adenopathy in RIGHT axilla.  Recommended treatment with AC.    Will start AC and see me back.    If good response plan for ALND at completion.         GYN History:  Age of menarche was 13. Age of menopause was 50. Patient denies hormonal therapy. Patient is . Age of first live birth was 24. Patient did not breast feed.    FAMILY History:  Son kidney cacner age 45  Mother br cancer age 56  Sister lung cancer  Father lung cancer    Past Medical History:   Diagnosis Date    Acute respiratory failure with hypoxia and hypercapnia 2017    Angina pectoris     Arthritis     Bell's palsy     left facial weakness    Breast cancer     RIGHT    CAD (coronary  artery disease)     Cervical cancer     Chronic bronchitis     COPD (chronic obstructive pulmonary disease)     Dr. Katz    Dental bridge present     Emphysema of lung     H/O colonoscopy 06/2017    due for repeat colonsocopy in 6/2018    History of heart artery stent     Dr. Ortiz  x2 stents    Hyperlipidemia     Hypertension     Lung cancer     Myocardial infarction     SUNITHA (obstructive sleep apnea)     intolerant to mask    Pneumonia     Pneumonia due to other staphylococcus     PUD (peptic ulcer disease)     Severe anemia 6/11/2018    Sleep apnea     Vaginal delivery     x1     Past Surgical History:   Procedure Laterality Date    ADENOIDECTOMY      OWOOSJ-HWMTCH-CN N/A 2/14/2018    Performed by [facility]    BIOPSY-SENTINEL NODE (89593) Right 7/11/2014    Performed [facility] at [facility] OR    BIOPSY-ULTRASOUND GUIDED CORE Right 6/17/2014    [facility]formed by [facility]    BREAST BIOPSY Right     x3    BREAST LUMPECTOMY      BREAST SURGERY      lumpectomy right side     CERVIX SURGERY      cone    COLONOSCOPY N/A 1/29/2019    Performed by Emmanuel Perez [facility] at [facility] ENDO    COLONOSCOPY N/A 11/28/2018    Performed by Nura Urbina [facility] at [facility] ENDO    COLONOSCOPY N/A 6/30/2017    Performed by Julio Rudd [facility] at [facility] ENDO    COLONOSCOPY N/A 3/17/2017    Performed by [facility] at [facility] ENDO    ESOPHAGOGASTRODUODENOSCOPY (EGD) N/A 10/11/2017    Performed by [facility] at [facility] ENDO    EYE SURGERY Bilateral 06/08/2018    cataract     [id number]-Z-CATH N/A 3/7/2018    Performed by [facility]    LUMPECTOMY RIGHT BREAST S/P NEEDLE LOCALIZATION  Right 7/11/2014    [facility] OR    PORTACATH PLACEMENT Right 01/2018    sweat glands axillary regions Bilateral     TONSILLECTOMY       Current Outpatient Medications on File Prior to Visit   Medication Sig Dispense  Refill    albuterol (PROVENTIL) 2.5 mg /3 mL (0.083 %) nebulizer solution NEBULIZE 1 vial EVERY 6 HOURS AS NEEDED FOR WHEEZING 540 mL 1    albuterol (VENTOLIN HFA) 90 mcg/actuation inhaler INHALE 2 PUFF(S) BY MOUTH EVERY 4 - 6 HOURS AS NEEDED FOR SHORTNESS OF BREATH or WHEEZING 18 g 5    aspirin (ECOTRIN) 325 MG EC tablet Take 325 mg by mouth once daily.      fluticasone (FLONASE) 50 mcg/actuation nasal spray USE TWO SPRAYS IN EACH NOSTRIL ONCE A DAY  6    isosorbide mononitrate (IMDUR) 60 MG 24 hr tablet Take 1 tablet (60 mg total) by mouth once daily. 30 tablet 6    metoprolol tartrate (LOPRESSOR) 25 MG tablet Take 1 tablet (25 mg total) by mouth 2 (two) times daily. 180 tablet 2    TRELEGY ELLIPTA 100-62.5-25 mcg DsDv INHALE ONE PUFF INTO THE LUNGS ONCE A DAY  5    rosuvastatin (CRESTOR) 20 MG tablet take 1/2 TABLET(S) BY MOUTH ONCE A DAY 30 tablet 3     No current facility-administered medications on file prior to visit.      Social History     Socioeconomic History    Marital status: Single     Spouse name: Not on file    Number of children: Not on file    Years of education: Not on file    Highest education level: Not on file   Occupational History    Not on file   Social Needs    Financial resource strain: Not on file    Food insecurity:     Worry: Not on file     Inability: Not on file    Transportation needs:     Medical: Not on file     Non-medical: Not on file   Tobacco Use    Smoking status: Former Smoker     Packs/day: 0.25     Years: 50.00     Pack years: 12.50     Types: Cigarettes     Last attempt to quit: 2017     Years since quittin.8    Smokeless tobacco: Never Used   Substance and Sexual Activity    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     Drug use: No    Sexual activity: Never   Lifestyle    Physical activity:     Days per week: Not on file     Minutes per session: Not on file    Stress: Not on file   Relationships    Social connections:     Talks on  "phone: Not on file     Gets together: Not on file     Attends [employer] service: Not on file     Active member of club or organization: Not on file     Attends meetings of clubs or organizations: Not on file     Relationship status: Not on file   Other Topics Concern    Not on file   Social History Narrative    Not on file     Family History   Problem Relation Age of Onset    Cancer Mother         breast    Heart disease Mother     Breast cancer Mother     Cancer Father         lung-smoker     Cancer Sister         lung-smoker     Heart attack Sister     Cancer Maternal Grandmother     Heart disease Maternal Grandmother     Cancer Maternal Grandfather     Heart disease Maternal Grandfather     Cancer Paternal Grandmother     Cancer Paternal Grandfather     Cancer Sister         mets not sure where it started     No Known Problems Sister         Review of Systems  Review of Systems   Constitutional: Negative for fatigue and fever.   HENT: Negative for sore throat and trouble swallowing.    Eyes: Negative for visual disturbance.   Respiratory: Negative for cough and shortness of breath.    Cardiovascular: Negative for chest pain and palpitations.   Gastrointestinal: Negative for abdominal pain, constipation, diarrhea and nausea.   Genitourinary: Negative for difficulty urinating and dysuria.   Musculoskeletal: Negative for back pain.        Right should pain  Lymphedema right arm     Neurological: Negative for dizziness, weakness and headaches.   Hematological: Negative for adenopathy.        Objective:   PHYSICAL EXAM:  Ht 5' 3" (1.6 m)   Wt 85.6 kg (188 lb 11.4 oz)   LMP  (LMP Unknown)   BMI 33.43 kg/m²     Physical Exam   Constitutional: She is oriented to person, place, and time. She appears well-developed.   HENT:   Head: Normocephalic.   Eyes: Pupils are equal, round, and reactive to light.   Neck: Normal range of motion.   Cardiovascular: Normal rate.    Pulmonary/Chest: Effort normal. She " exhibits no tenderness and no deformity. Right breast exhibits no inverted nipple, no mass, no nipple discharge, no skin change and no tenderness. Left breast exhibits no inverted nipple, no mass, no nipple discharge, no skin change and no tenderness.   Abdominal: Soft. She exhibits no distension.   Musculoskeletal: She exhibits no edema.   Lymphadenopathy:     She has no cervical adenopathy.     She has axillary adenopathy.        Right axillary: Pectoral and lateral adenopathy present.        Right: No supraclavicular adenopathy present.        Left: No supraclavicular adenopathy present.   Neurological: She is alert and oriented to person, place, and time.   Psychiatric: She has a normal mood and affect.         Radiology review: Images personally reviewed by me in the clinic.       Assessment:      Ade Castellano is a 73 y.o. postmenopausal female with recently diagnosed squamous cell carcinoma of the lung and now with right axillary adenopathy.    Plan:   Discuss with Dr. Holliday    Will start AC and see me back.    If good response plan for ALND at completion.

## 2019-09-19 ENCOUNTER — HOSPITAL ENCOUNTER (OUTPATIENT)
Dept: RADIOLOGY | Facility: HOSPITAL | Age: 74
Discharge: HOME | End: 2019-09-19
Attending: INTERNAL MEDICINE
Payer: MEDICARE

## 2019-09-19 DIAGNOSIS — C50.919 RECURRENT BREAST CANCER, UNSPECIFIED LATERALITY: ICD-10-CM

## 2019-09-19 DIAGNOSIS — C50.811 MALIGNANT NEOPLASM OF OVERLAPPING SITES OF RIGHT FEMALE BREAST, UNSPECIFIED ESTROGEN RECEPTOR STATUS: ICD-10-CM

## 2019-09-19 DIAGNOSIS — R59.0 AXILLARY LYMPHADENOPATHY: ICD-10-CM

## 2019-09-19 DIAGNOSIS — C34.91 SQUAMOUS CELL CARCINOMA OF RIGHT LUNG: ICD-10-CM

## 2019-09-19 PROCEDURE — 78815 PET IMAGE W/CT SKULL-THIGH: CPT | Mod: 26,HCNC,PS, | Performed by: RADIOLOGY

## 2019-09-19 PROCEDURE — 78815 PET IMAGE W/CT SKULL-THIGH: CPT | Mod: TC,HCNC

## 2019-09-19 PROCEDURE — 78815 NM PET CT ROUTINE: ICD-10-PCS | Mod: 26,HCNC,PS, | Performed by: RADIOLOGY

## 2019-09-19 PROCEDURE — A9552 F18 FDG: HCPCS | Mod: HCNC

## 2019-09-25 ENCOUNTER — TELEPHONE (OUTPATIENT)
Dept: SURGERY | Facility: CLINIC | Age: 74
End: 2019-09-25

## 2019-09-26 ENCOUNTER — PATIENT OUTREACH (OUTPATIENT)
Dept: ADMINISTRATIVE | Facility: OTHER | Age: 74
End: 2019-09-26

## 2019-09-27 ENCOUNTER — CLINICAL SUPPORT (OUTPATIENT)
Dept: FAMILY MEDICINE | Facility: CLINIC | Age: 74
End: 2019-09-27
Payer: MEDICARE

## 2019-09-27 DIAGNOSIS — Z23 FLU VACCINE NEED: Primary | ICD-10-CM

## 2019-09-27 PROCEDURE — 90662 FLU VACCINE - HIGH DOSE (65+) PRESERVATIVE FREE IM: ICD-10-PCS | Mod: HCNC,S$GLB,, | Performed by: FAMILY MEDICINE

## 2019-09-27 PROCEDURE — 90662 IIV NO PRSV INCREASED AG IM: CPT | Mod: HCNC,S$GLB,, | Performed by: FAMILY MEDICINE

## 2019-09-27 PROCEDURE — G0008 FLU VACCINE - HIGH DOSE (65+) PRESERVATIVE FREE IM: ICD-10-PCS | Mod: HCNC,S$GLB,, | Performed by: FAMILY MEDICINE

## 2019-09-27 PROCEDURE — G0008 ADMIN INFLUENZA VIRUS VAC: HCPCS | Mod: HCNC,S$GLB,, | Performed by: FAMILY MEDICINE

## 2019-09-30 ENCOUNTER — OFFICE VISIT (OUTPATIENT)
Dept: SURGERY | Facility: CLINIC | Age: 74
End: 2019-09-30
Payer: MEDICARE

## 2019-09-30 ENCOUNTER — OFFICE VISIT (OUTPATIENT)
Dept: HEMATOLOGY/ONCOLOGY | Facility: CLINIC | Age: 74
End: 2019-09-30
Payer: MEDICARE

## 2019-09-30 VITALS
HEART RATE: 62 BPM | SYSTOLIC BLOOD PRESSURE: 147 MMHG | HEIGHT: 63 IN | BODY MASS INDEX: 31.17 KG/M2 | DIASTOLIC BLOOD PRESSURE: 76 MMHG | WEIGHT: 175.94 LBS

## 2019-09-30 VITALS
OXYGEN SATURATION: 99 % | DIASTOLIC BLOOD PRESSURE: 76 MMHG | BODY MASS INDEX: 32.07 KG/M2 | SYSTOLIC BLOOD PRESSURE: 147 MMHG | TEMPERATURE: 98 F | HEIGHT: 63 IN | HEART RATE: 62 BPM | WEIGHT: 181 LBS

## 2019-09-30 DIAGNOSIS — D64.81 ANTINEOPLASTIC CHEMOTHERAPY INDUCED ANEMIA: ICD-10-CM

## 2019-09-30 DIAGNOSIS — C34.91 SQUAMOUS CELL CARCINOMA OF RIGHT LUNG: ICD-10-CM

## 2019-09-30 DIAGNOSIS — R59.0 AXILLARY ADENOPATHY: Primary | ICD-10-CM

## 2019-09-30 DIAGNOSIS — Z85.3 PERSONAL HISTORY OF BREAST CANCER: ICD-10-CM

## 2019-09-30 DIAGNOSIS — T45.1X5A ANTINEOPLASTIC CHEMOTHERAPY INDUCED ANEMIA: ICD-10-CM

## 2019-09-30 DIAGNOSIS — C50.919 RECURRENT BREAST CANCER, UNSPECIFIED LATERALITY: Primary | ICD-10-CM

## 2019-09-30 DIAGNOSIS — R59.0 AXILLARY LYMPHADENOPATHY: ICD-10-CM

## 2019-09-30 PROCEDURE — 99999 PR PBB SHADOW E&M-EST. PATIENT-LVL III: CPT | Mod: PBBFAC,HCNC,, | Performed by: INTERNAL MEDICINE

## 2019-09-30 PROCEDURE — 99499 RISK ADDL DX/OHS AUDIT: ICD-10-PCS | Mod: HCNC,S$GLB,, | Performed by: SURGERY

## 2019-09-30 PROCEDURE — 99214 OFFICE O/P EST MOD 30 MIN: CPT | Mod: HCNC,S$GLB,, | Performed by: INTERNAL MEDICINE

## 2019-09-30 PROCEDURE — 99215 OFFICE O/P EST HI 40 MIN: CPT | Mod: HCNC,S$GLB,, | Performed by: SURGERY

## 2019-09-30 PROCEDURE — 99999 PR PBB SHADOW E&M-EST. PATIENT-LVL III: CPT | Mod: PBBFAC,HCNC,, | Performed by: SURGERY

## 2019-09-30 PROCEDURE — 1101F PT FALLS ASSESS-DOCD LE1/YR: CPT | Mod: HCNC,CPTII,S$GLB, | Performed by: INTERNAL MEDICINE

## 2019-09-30 PROCEDURE — 1101F PT FALLS ASSESS-DOCD LE1/YR: CPT | Mod: HCNC,CPTII,S$GLB, | Performed by: SURGERY

## 2019-09-30 PROCEDURE — 99999 PR PBB SHADOW E&M-EST. PATIENT-LVL III: ICD-10-PCS | Mod: PBBFAC,HCNC,, | Performed by: SURGERY

## 2019-09-30 PROCEDURE — 99499 UNLISTED E&M SERVICE: CPT | Mod: HCNC,S$GLB,, | Performed by: INTERNAL MEDICINE

## 2019-09-30 PROCEDURE — 99214 PR OFFICE/OUTPT VISIT, EST, LEVL IV, 30-39 MIN: ICD-10-PCS | Mod: HCNC,S$GLB,, | Performed by: INTERNAL MEDICINE

## 2019-09-30 PROCEDURE — 1101F PR PT FALLS ASSESS DOC 0-1 FALLS W/OUT INJ PAST YR: ICD-10-PCS | Mod: HCNC,CPTII,S$GLB, | Performed by: INTERNAL MEDICINE

## 2019-09-30 PROCEDURE — 99499 RISK ADDL DX/OHS AUDIT: ICD-10-PCS | Mod: HCNC,S$GLB,, | Performed by: INTERNAL MEDICINE

## 2019-09-30 PROCEDURE — 99215 PR OFFICE/OUTPT VISIT, EST, LEVL V, 40-54 MIN: ICD-10-PCS | Mod: HCNC,S$GLB,, | Performed by: SURGERY

## 2019-09-30 PROCEDURE — 99499 UNLISTED E&M SERVICE: CPT | Mod: HCNC,S$GLB,, | Performed by: SURGERY

## 2019-09-30 PROCEDURE — 99999 PR PBB SHADOW E&M-EST. PATIENT-LVL III: ICD-10-PCS | Mod: PBBFAC,HCNC,, | Performed by: INTERNAL MEDICINE

## 2019-09-30 PROCEDURE — 1101F PR PT FALLS ASSESS DOC 0-1 FALLS W/OUT INJ PAST YR: ICD-10-PCS | Mod: HCNC,CPTII,S$GLB, | Performed by: SURGERY

## 2019-09-30 NOTE — PROGRESS NOTES
Subjective:       Patient ID: Ade Castellano is a 73 y.o. female.    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   Pathology showed a 1.15 cm, grade 3 infiltrating ductal carcinoma.  One sentinel lymph node was negative for malignancy.  Margins were negative and the closest margin was 1 cm.  She was staged a little pT1c little pN0 cM0 stage IA.  She declined adjuvant chemo therapy.  She completed post operative radiation therapy at 400 cGy per fraction to 4000 cGy 9/2014         Pt hospitalized 11/2017   Ms. Castellano was admitted for acute on chronic respiratory failure requiring intubation and ventilation. Has large lung mass in right lung with probable post obstructive pneumonia resulting in COPD exacerbation. But also, patient had ran out of home O2 prior to onset of symptoms, likely contributing to presentation. Initiated on broad spectrum abx, IV steroids, duo-nebs and pulmonology consulted for ventilator co-management. Successfully extubated to BiPAP on 11/30. Then de-escalated to low flow nasal cannula. Abx tailored to augmentin for broad coverage, including anaerobic bacteria /     CTA chest 11/29/2017 revealed   Large right hilar mass with involvement of adjacent segmental pulmonary arterial branches and bronchi with associated postobstructive atelectasis and volume loss in the right middle lobe with adjacent ground glass opacity.  Findings highly concerning for underlying neoplastic process. Mediastinal and axillary adenopathy, with a right axillary lymph node measuring up to 2.0 cm in short axis diameter. No definite evidence of pulmonary thromboembolism.    She is followed by Pulmonology   She underwent  rt axillary LN bx at outside facility- on 1/16/2018 at [facility]  Pathology revealed metastatic poorly differentiated carcinoma of unknown primary site  TTF-1 negative, napsin negative  , cytokeratin 7 positive, cytokeratin 20 negative, p63 negative, cytokeratin 5/6 focal dim positivity    She next underwent lung bx at [facility] l1/31/2018   Pathology revealed benign lung tissue    She underwent Repeat Right Lung Bx 2/14/2018 Pathology reveals Squamous cell carcinoma  PD-L1 10% low expression  EGFR NEG  ALK NEG  BRAF NEG      Outside slides axillary LN specimen  requested for review/comparison to lung bx findings     1) Right Lung Bx 2/14/2018  Supplemental Diagnosis  Immunohistochemical stains show strong nuclear staining for p63 in essentially all tumor cells and very strong  cytoplasmic and membrane staining for CK5/6, also in essentially all tumor cells. TTF-1 and CK7 are negative  within tumor cells but do stain native pulmonary elements present within the biopsy. A stain for mucicarmine is  negative. Positive and negative controls function appropriately.  Final diagnosis: Specimen submitted as right lung biopsy  -Squamous cell carcinoma  (Electronically Signed: 2018-02-20 09:12:07 )  Diagnosed by: Phong Thompson  FINAL PATHOLOGIC DIAGNOSIS  Fragments of pulmonary parenchyma (submitted as right lung biopsy):    FINAL PATHOLOGIC DIAGNOSIS 1. Lymph node, right axilla, biopsy, review of 10 outside slides [facility], JS [phone],  collected January 11, 2018: Metastatic poorly differentiated carcinoma (see comment).  The histologic section shows fibrous stroma infiltrated by nest of poorly differentiated malignant cells including  occasional dyskeratotic cells morphologically suggestive of metastatic squamous cell carcinoma.  Immunohistochemical stains are nonspecific; the cells are positive for cytokeratin 7 and cytokeratin 5/6 (focal) and  negative for cytokeratin 20, TTF-1, p63, GCDFP,  mammoglobin, and CEA      PET/CT  4/19/2018 revealed there has been a excellent response to therapy.  At least 90% reduction in malignant activity.    She s/p  cycle 6 of carboplatin/Abraxane completed 7/2018        PET/CT 2/21/2019 increased size and irregularity of the right axillary lymph node with increased FDG avidity     MAMMO/US rt breast 2/2019 revealed suspicious findings rt axilla LN.  Biopsy of the lymph node measuring 1.4 x 1.1 x 1.3 recommended    Pathology 3/11/2019   FINAL PATHOLOGIC DIAGNOSIS  Right axilla, mass, core biopsy:  Positive for poorly differentiated carcinoma.  he staining profile of the current axillary mass match more closely with the previous  breast carcinoma (due to the p63 negativity in both the 2014 breast cancer and the current axillary mass lesion but  p63 positivity in the lung mass from 2018).  This staining profile, together with the comparison with the patient's prior breast tumor and prior lung tumor supports  a diagnosis of poorly differentiated carcinoma and the malignancy appears morphologically similar to the prior  malignancies from both sites, but the staining profile in this small sample from the axillary mass more closely  correlates with the prior breast malignancy. Pathologic subclassification of this malignancy's primary location is not  definitive and clinical correlation is recommended definitively decide on possible primary location in this patient's  axillary mass sample.  Supplemental (2):  Additional immunohistochemical staining for progesterone receptor and HER2 are completed per clinician request  with following results:  Progesterone receptor: Negative; 0% nuclear tumor cell staining.  HER2: Negative; stain score = 0.  Supplemental (3):    Slides sent to [facility] for outside review  It was determined that agree with  interpretation of this case. In my opinion, the lung tumor corresponds to a squamous cell  carcinoma and appears to be unrelated to the  invasive ductal carcinoma of the breast. The axillary mass, in my  opinion, corresponds to metastases of the breast primary. Although it is a poorly differentiated carcinoma, the  immunophenotype is most consistent with the one that the breast primary exhibited, and is inconsistent with a  metastatic squamous cell carcinoma. I      Further testing sent for cancer type ID also sent and results have revealed molecular diagnosis testing revealed head and neck salivary gland carcinoma probability 84% breast adenocarcinoma could not be excluded with a 12% probability      She is s/p AC q21d x 4 cycles completed 9/6/2019     She is doing great  She remains active  She plans on mowing lawn today   Appetite and weight stable  She has diffuse arthralgias-stable  No  swelling in rt arm    No hemoptysis   No bleeding-urinary/rectal/nasal   No SOB/CP       PET/CT 9/19/2019 shows disease response with interval decrease in size and uptake of several right axillary lymph nodes and a supraclavicular lymph node.  Mild residual activity may indicate viable tumor.  No new hypermetabolic findings worrisome for malignancy.          PrevHx:She had an abnormal mammogram 6/4/2014 which  Revealed a round solid mass 6mm in rt breast.  She then underwent U/S guided core bx of rt breast mass on 6/17/2014.  Pathology revealed infiltrating ductal carcinoma Grade 3 with tumor  Present in thin-walled spaces suggestive of lymphatic spaces. Hormone  Receptor status on tumor specimen revealed ER negative 0% ,  ID negative 0% and Her 2 jose maria negative.  She subsequently underwent rt segmental mastectomy and SLN bx  On 7/11/2014. Pathology of rt breast lumpectomy revealed invasive  Ductal carcinoma with micropapillary pattern ( Invasive micropapillary  Ca) with max tumor dimension 11.5mm with suggestion of tumor in   Thin walled spaces c/w lymphovascular involvement. Surgical  Margins free of tumor, grade 3, ER/ID neg , Her 2 jose maria neg   With [place] Lymph  "node negative for Neoplasm.   Pathologic staging [id number](i-)  Her mother was diagnosed with breast cancer in her 50's/  She has no family history of ovarian cancer.           Review of Systems   Constitutional: Negative for appetite change, fever and unexpected weight change.   HENT: Negative for mouth sores and rhinorrhea.    Eyes: Negative for visual disturbance.   Respiratory: Negative for cough and shortness of breath (mild).    Cardiovascular: Negative for chest pain.   Gastrointestinal: Negative for abdominal pain and diarrhea.   Genitourinary: Negative for frequency.   Musculoskeletal: Negative for back pain.   Skin: Negative for rash.   Neurological: Negative for dizziness and headaches.   Hematological: Negative for adenopathy.   Psychiatric/Behavioral: The patient is not nervous/anxious.        Objective:        Vitals:    09/30/19 0916   BP: (!) 147/76   BP Location: Left arm   Patient Position: Sitting   BP Method: Medium (Automatic)   Pulse: 62   Temp: 97.9 °F (36.6 °C)   TempSrc: Oral   SpO2: 99%   Weight: 82.1 kg (181 lb)   Height: 5' 3" (1.6 m)         Physical Exam   Constitutional: She is oriented to person, place, and time. She appears well-developed and well-nourished.   HENT:   Head: Normocephalic.   Mouth/Throat: Oropharynx is clear and moist. No oropharyngeal exudate.   Eyes: Pupils are equal, round, and reactive to light. Conjunctivae and lids are normal. No scleral icterus.   Neck: Normal range of motion. Neck supple. No thyromegaly present.   Cardiovascular: Normal rate, regular rhythm and normal heart sounds.   No murmur heard.  Pulmonary/Chest: Effort normal and breath sounds normal. She has no wheezes.   Abdominal: Soft. Bowel sounds are normal. There is no tenderness. There is no rebound and no guarding.   Musculoskeletal: Normal range of motion. She exhibits no edema or tenderness.   Lymphadenopathy:     She has no cervical adenopathy.     She has no axillary adenopathy.        Right: " No supraclavicular adenopathy present.        Left: No supraclavicular adenopathy present.   Neurological: She is alert and oriented to person, place, and time. No cranial nerve deficit. Coordination normal.   Skin: Skin is warm and dry. No ecchymosis, no petechiae and no rash noted. No erythema.   Psychiatric: She has a normal mood and affect.             CT a/p w/contrast 11/29/2018   1. No acute abnormality identified within the abdomen and pelvis.    2.  There are a few nonspecific prominent lymph nodes in the upper abdomen including a periesophageal lymph node which measures 1.0 cm in short axis diameter.    3.  Significant abdominal aortic atherosclerosis and abdominal aorta ectasia.    4.  Additional findings as above.    PET/CT 1/26/2018   1.  Intense FDG uptake within the known right infrahilar lung mass, compatible with malignancy.  It is unclear if this represents primary lung malignancy or metastatic breast cancer.    2.  Intensely hypermetabolic right axillary, retropectoral, and lower right cervical lymph nodes, compatible with metastases.    3.  Abnormal FDG uptake along right breast skin thickening, new from prior chest CTA and mammogram.  This may relate to localized edema/inflammation, though correlation with physical exam and mammography are recommended to exclude underlying inflammatory carcinoma.      MRI Brain w w/out contrast 2/9/2018  1.  No evidence of intracranial metastases.  2.  Sinus disease       Rt axillary LN bx at outside facility- on 1/16/2018 at [facility]  Pathology revealed metastatic poorly differentiated carcinoma of unknown primary  site 1/16/2018 at [facility]  TTF-1 negative, napsin negative  , cytokeratin 7 positive, cytokeratin 20 negative, p63 negative, cytokeratin 5/6 focal dim positivity    LUNG BIOPSY 1/31/2018  Pathology revealed benign lung tissue         SPECIMEN  1) Right Lung Bx 2/14/2018  Supplemental Diagnosis  Immunohistochemical  stains show strong nuclear staining for p63 in essentially all tumor cells and very strong  cytoplasmic and membrane staining for CK5/6, also in essentially all tumor cells. TTF-1 and CK7 are negative  within tumor cells but do stain native pulmonary elements present within the biopsy. A stain for mucicarmine is  negative. Positive and negative controls function appropriately.  Final diagnosis: Specimen submitted as right lung biopsy  -Squamous cell carcinoma  (Electronically Signed: 2018-02-20 09:12:07 )  Diagnosed by: Phong Thompson  FINAL PATHOLOGIC DIAGNOSIS  Fragments of pulmonary parenchyma (submitted as right lung biopsy):    FINAL PATHOLOGIC DIAGNOSIS 1. Lymph node, right axilla, biopsy, review of 10 outside slides [facility], JS [phone],  collected January 11, 2018: Metastatic poorly differentiated carcinoma (see comment).  The histologic section shows fibrous stroma infiltrated by nest of poorly differentiated malignant cells including  occasional dyskeratotic cells morphologically suggestive of metastatic squamous cell carcinoma.  Immunohistochemical stains are nonspecific; the cells are positive for cytokeratin 7 and cytokeratin 5/6 (focal) and  negative for cytokeratin 20, TTF-1, p63, GCDFP, mammoglobin, and CEA        PET/CT 2/21/2019  Increased size and irregularity of the right axillary lymph node with increased FDG avidity.  Although this would be atypical in location for lung carcinoma, the increased size and avidity must be concerning for metastatic disease in this patient with history of squamous cell lung cancer.     US Rt breast and mammo 2/26/2019  Impression:  Right  Lymph Node: Right axilla lymph node. Assessment: 4 - Suspicious finding. Biopsy is recommended.      BI-RADS Category:   Right: 4 - Suspicious  Overall: 4 - Suspicious     Recommendation:  Biopsy is recommended. Biopsy of the lymph node measuring 1.4 x 1.1 x 1.3 recommended , the one with the round shape  configuration, corresponding to the most recent PET CT finding.         Pathology 3/11/2019   FINAL PATHOLOGIC DIAGNOSIS  Right axilla, mass, core biopsy:  Positive for poorly differentiated carcinoma.  See comment.  Comment:  The biopsy from the right axilla mass shows a poorly differentiated carcinoma in background lymphoid tissue  with enlarged cells showing increased nuclear size, prominent nucleoli, moderate amounts of cytoplasm and mitotic  figures. Immunostains are completed and reveal the tumor cells to stain positively with cytokeratin AE 1/AE3, CK  5/6 and CK 7. The tumor cells are negative for CK 20, Estrogen receptor, p63 and TTF-1. All stains have  satisfactory positive and negative controls. The patient's prior cases will be reviewed and an additional stain for  JUAREZ-3 is pending in an attempt to pinpoint the primary site of this malignancy and results will follow in a  supplemental report.      Supplemental Diagnosis  3/11/2019  The current axillary mass is compared to the patient's prior right lung biopsy (case number [id number]) and the  current axillary mass is morphologically similar to the lung malignancy. Also, the current axillary mass is compared  to the patient's prior breast resection (Case number [id number]) and appears similar as well. P63 is negative in the  [id number] tumor and CK 5/6 shows scattered positive staining in the [id number] tumor.  Immunostain for JUAREZ-3 is completed and shows only rare weak to moderate staining within the tumor cell nuclei  with satisfactory positive and negative controls. This stain is positive in the surrounding lymphocytes. This staining  pattern is non-specific and does not definitively differentiate between a lung and breast primary malignancy in this  right axilla mass biopsy. The staining profile of the current axillary mass match more closely with the previous  breast carcinoma (due to the p63 negativity in both the 2014 breast cancer and the current  axillary mass lesion but  p63 positivity in the lung mass from 2018).  This staining profile, together with the comparison with the patient's prior breast tumor and prior lung tumor supports  a diagnosis of poorly differentiated carcinoma and the malignancy appears morphologically similar to the prior  malignancies from both sites, but the staining profile in this small sample from the axillary mass more closely  correlates with the prior breast malignancy. Pathologic subclassification of this malignancy's primary location is not  definitive and clinical correlation is recommended definitively decide on possible primary location in this patient's  axillary mass sample.  Supplemental (2):  Additional immunohistochemical staining for progesterone receptor and HER2 are completed per clinician request  with following results:  Progesterone receptor: Negative; 0% nuclear tumor cell staining.  HER2: Negative; stain score = 0.  Supplemental (3):  [place] DIAGNOSIS:  FINAL DIAGNOSIS  Breast, right, needle core biopsy ([id number]; 06/17/2014): Invasive ductal carcinoma, Jaiden grade III (of  III), with focal micropapillary features.  Immunohistochemical stains performed at the referring institution show that the tumor cells have the following  phenotype: - Estrogen receptor: Negative (0% tumor cells staining). - Progesterone receptor: Negative (0% tumor  cells staining). - HER2: Negative (score 0).  Lymph nodes, right, sentinel biopsy and lumpectomy ([id number]; 07/11/2014):  1. Right sentinel lymph node: A single (1) lymph node is negative for metastatic carcinoma.  Immunohistochemical stains performed by the referring institution and reviewed at [facility] for cytokeratin are  negative, confirming the diagnosis.  2. Right breast: Invasive ductal carcinoma with micropapillary features, per report 11.5 mm in greatest dimension.  Foci suspicious for lymphovascular invasion identified. Margins of resection are free of  tumor.  Immunohistochemical stains performed by the referring institution and reviewed at [facility] show that the tumor  cells are negative for p63 and show patchy positivity for CK 5/6.  Lung, right, needle core biopsy ([id number]; 02/14/2018): Squamous cell carcinoma.    Immunohistochemical stains performed by the referring institution show the tumor cells are strongly positive for p63  and CK 5/6, while negative for TTF-1 and CK7. These result support the diagnosis. Axilla, right, needle core biopsy  ([id number]; 03/11/2019): Lymph node positive for metastatic carcinoma, most consistent with breast primary  (see comment).  Immunohistochemical stains performed by the referring institution and reviewed at [facility] show that the tumor  cells are negative for p63, focally positive for CK 5/6, focally and weakly positive for JUAREZ-3, and strongly positive  for CK7. They are also negative for estrogen receptor, progesterone receptor, and HER2 JAM (score 0).  COMMENT:  Thank you for allowing me to review the case of this 73-year-old lady who recently underwent needle core biopsies  of a right axillary mass and who has a previous diagnosis of an invasive ductal carcinoma of the right breast, as well  as a squamous cell carcinoma of the lung. I am reviewing this case because of my special interest in breast  pathology.  I agree with your interpretation of this case. In my opinion, the lung tumor corresponds to a squamous cell  carcinoma and appears to be unrelated to the invasive ductal carcinoma of the breast. The axillary mass, in my  opinion, corresponds to metastases of the breast primary. Although it is a poorly differentiated carcinoma, the  immunophenotype is most consistent with the one that the breast primary exhibited, and is inconsistent with a  metastatic squamous cell carcinoma. I did share the case with Dr. Anabel Stone, one of our pulmonary pathologists,  and she concurs with this interpretation.  Note:  Report attached.  Performing location:  [facility]  [address] [place] [address]    CT neck/chest/abd/pelvis w/contrast 4/26/2019   The previously described right hilar mass is no longer visible.  There is persistent volume loss in the right middle lobe this appears similar to the prior PET-CT February 21, 2019.    Persistent abnormal right axillary node today measuring about 15 mm compared 18 mm prior.  The positioning of the adjacent nodes is slightly different likely accounting for the slight difference in measurement.    Cluster of tree-in-bud nodular densities left lower lobe series 2, image 93.  This may be related to small airways disease though serial follow-up suggested.      PET/CT 6/20/2019   New and worsening hypermetabolic lymph nodes concerning for metastatic breast cancer as described above.  Tissue sampling would be required for definitive diagnosis.      2-D echo 6/27/2019   · Normal left ventricular systolic function. The estimated ejection fraction is 55%  · Concentric left ventricular remodeling.  · Normal LV diastolic function.  · Normal right ventricular systolic function.  · Moderate left atrial enlargement.  · Mild right atrial enlargement.  · Mild aortic regurgitation.  · Mild mitral regurgitation.  · Mild tricuspid regurgitation.  · Normal central venous pressure (3 mm Hg).  · The estimated PA systolic pressure is 25 mm Hg      PET/CT 9/19/2019   Favorable response to therapy with interval decrease in size and uptake of several right axillary lymph nodes and a supraclavicular lymph node.  Mild residual activity may indicate viable tumor.    No new hypermetabolic findings worrisome for malignancy.      Assessment:       1. Recurrent breast cancer, unspecified laterality    2. Axillary lymphadenopathy    3.  History of Squamous cell carcinoma of right lung    4. Antineoplastic chemotherapy induced anemia        Plan:   ECOG 0  1-4    Pt with hx  of Stage 1A invasive ductal carcinoma rt breast s/p rt segmental mastectomy and SLN bx 7/11/2014 ER/SC neg HER 2 jose maria neg [id number](i-).  S/p adjuvant RT completed 9/2014 Pt declined adjuvant chemo  Follow-up Mammo 6/12/2017 No mammographic evidence of malignancy.  Pt  hospitalized 11/2017 with acute-on-chronic  resp failure  Abnormal CT imaging revealing Large right hilar mass with involvement of  adjacent segmental pulmonary arterial branches and bronchi with associated postobstructive atelectasis  and involvement of mediastinal and axillary adenopathy   S/p rt axillary LN bx ( outside facility) - metastatic poorly differentiated carcinoma of unknown primary  site  status post  lung biopsy 1/31/2017 -benign lung tissue  PET/CT 1/26/2018   Intense FDG uptake within the known right infrahilar lung mass, compatible with malignancy.   Intensely hypermetabolic right axillary, retropectoral, and lower right cervical lymph nodes, compatible with metastases.  Abnormal FDG uptake along right breast skin thickening, new from prior chest CTA and mammogram.    Repeat lung bx 2/14/2018 revealed Squamous Cell CA lung  PD-L1 10% low expression  EGFR NEG  ALK NEG    Pt treated for advanced squamous cell CA of lung   She s/p  cycle 6 of carboplatin/Abraxane Day1 completed 7/2/2018 ( day 15 held due to prolonged cytopenias)  She completed therapy with near CR     PET/CT 2/21/2019 showed increased size and irregularity of the right axillary lymph node with increased FDG avidity    Recent MAMMO/US rt breast reveals suspicious findings rt axilla LN.  Biopsy of the lymph node measuring 1.4 x 1.1 x 1.3     Pt s/p  rt axillary LN bx  Pathology 3/11/2019   FINAL PATHOLOGIC DIAGNOSIS  Right axilla, mass, core biopsy:  Positive for poorly differentiated carcinoma.- likely breast primary   ERneg PRneg Her 2 neg    Outside review of specimen(s) revealed  lung tumor corresponds to a squamous cell carcinoma and appears to be unrelated to the  invasive ductal carcinoma of the breast. It was determined The axillary mass, in my opinion, corresponded  to metastases of the breast primary. Although it is a poorly differentiated carcinoma, theimmunophenotype is most consistent with the one that the breast primary exhibited, and is inconsistent with a metastatic squamous cell carcinoma.     CT imaging 4/26/2019 persistent rt axillary LAD, no other sites of disease     Pf followed by Rad/Onc regarding RT   It was determined to discuss potential surgical intervention involving alnd  Plan to discuss with breast surgeon , Dr. Alexandra potential surgical intervention       Lymph node biopsy 3/11/2019 Right axilla, mass, core biopsy:  Positive for poorly differentiated carcinoma.   favor breast primary   Pt was discussed at multidisciplinary tumor board  D/w Pathologist    PET/CT 6/20/2019  New and worsening hypermetabolic lymph nodes concerning for metastatic breast cancer     Previously Discussed  imaging findings in detail with patient which reveals new and worsening hypermetabolic melena metabolic lymph nodes including hypermetabolic supraclavicular node.  Therefore, at treatment option will be systemic chemotherapy in light of the recent imaging findings.  She will be followed by her surgical oncologist Dr. Christopher      IT was determined to proceed with systemic chemotherapy   She underwent systemic chemotherapy with AC  S/p  AC [id number] x 3 wks x 4 wks completed 9/6/2019   ( pt has not received in past)      PET/CT 9/19/2019 shows disease response with interval decrease in size and uptake of several right axillary lymph nodes and a supraclavicular lymph node.  Mild residual activity may indicate viable tumor.  No new hypermetabolic findings worrisome for malignancy.       Discussed radiographic findings with pt which shows response to therapy  Pt with disease good  response to chemo and no other sites of disease  Pt followed by  Breast Surgery and plan for  ALND    Surgical intervention tentatively planned 10/14/2019  Plan to d/w Rad/onc postoperative RT to [place]   D/w Dr. Alexandra      2-D echo reviewed and LVEF 55%   Follow-up on  PD-L1 testing   Hb 10.5g/dl-stable  Cont to monitor     Follow-up  2-3 weeks following surgical intervention     Advance Care Planning     Power of   I initiated the process of advance care planning today and explained the importance of this process to the patient.  I introduced the concept of advance directives to the patient, as well. Then the patient received detailed information about the importance of designating a Health Care Power of  (HCPOA). She was also instructed to communicate with this person about their wishes for future healthcare, should she become sick and lose decision-making capacity. The patient has previously appointed a HCPOA. After our discussion, the patient has decided to complete a HCPOA and has appointed her son and niece and  I spent a total time of 16 minutes discussing this issue with the patient.         Cc: Swetha Katz M.D.         MD Tory Roberson MD Raymond Gould, MD

## 2019-09-30 NOTE — PROGRESS NOTES
New Breast Cancer  History and Physical  [facility]  Department of Surgery    REFERRING PROVIDER: No referring provider defined for this encounter.    CHIEF COMPLAINT: history of right breast cancer now with lung primary and new axillary adenopathy Right    Subjective:      Ade Castellano is a 73 y.o. postmenopausal female referred for evaluation of new axillary right adenopathy. History of RIGHT breast cancer in  treated with lumpectomy/SLNB/XRT.  Triple negative.  No chemo. No endocrine therapy.    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  Presents to my clinic with right adenopathy with biopsy showing poorly differentiated carcinoma.     Here to discuss options.    Patient does routinely do self breast exams.  Patient has not noted a change on breast exam.  Patient denies nipple discharge. Patient reports to previous breast biopsy. Patient reports a personal history of breast cancer.      has received chemo for lung cancer with good response.  Now with new axillary mass.  No chemo since 2018.    2019 new mass axilla right.  Biopsy showed poorly differentiated carcinoma.   Discussed at tumor board 2019 and repeat PET recommended which showed increased adenopathy in RIGHT axilla.  Recommended treatment with AC.    Will start AC and see me back.    If good response plan for ALND at completion.         INTERVAL:  Did well with AC.  Completed chemo on 2019.  F/u PET showed very good response.       GYN History:  Age of menarche was 13. Age of menopause was 50. Patient denies hormonal therapy. Patient is . Age of first live birth was 24. Patient did not breast feed.    FAMILY History:  Son kidney cacner age 45  Mother br cancer age 56  Sister lung cancer  Father lung cancer    Past Medical History:   Diagnosis Date    Acute respiratory failure with hypoxia and hypercapnia 2017    Angina pectoris     Arthritis      Bell's palsy     left facial weakness    Breast cancer     RIGHT    CAD (coronary artery disease)     Cervical cancer     Chronic bronchitis     COPD (chronic obstructive pulmonary disease)     Dr. Katz    Dental bridge present     Emphysema of lung     H/O colonoscopy 06/2017    due for repeat colonsocopy in 6/2018    History of heart artery stent     Dr. Ortiz  x2 stents    Hyperlipidemia     Hypertension     Lung cancer     Myocardial infarction     SUNITHA (obstructive sleep apnea)     intolerant to mask    Pneumonia     Pneumonia due to other staphylococcus     PUD (peptic ulcer disease)     Severe anemia 6/11/2018    Sleep apnea     Vaginal delivery     x1     Past Surgical History:   Procedure Laterality Date    ADENOIDECTOMY      BREAST BIOPSY Right     x3    BREAST LUMPECTOMY      BREAST SURGERY      lumpectomy right side     CERVIX SURGERY      cone    COLONOSCOPY N/A 3/17/2017    Procedure: COLONOSCOPY;  Surgeon: Julio Rudd MD;  Location: [facility] ENDO;  Service: Endoscopy;  Laterality: N/A;    COLONOSCOPY N/A 6/30/2017    Procedure: COLONOSCOPY;  Surgeon: Julio Rudd MD;  Location: [facility] ENDO;  Service: Endoscopy;  Laterality: N/A;    COLONOSCOPY N/A 11/28/2018    Procedure: COLONOSCOPY;  Surgeon: Nura Urbina MD;  Location: [facility] ENDO;  Service: Endoscopy;  Laterality: N/A;    COLONOSCOPY N/A 1/29/2019    Procedure: COLONOSCOPY;  Surgeon: Emmanuel Perez MD;  Location: [facility] ENDO;  Service: Endoscopy;  Laterality: N/A;  confirmed appt-SP    EYE SURGERY Bilateral 06/08/2018    cataract     PORTACATH PLACEMENT Right 01/2018    sweat glands axillary regions Bilateral     TONSILLECTOMY       Current Outpatient Medications on File Prior to Visit   Medication Sig Dispense Refill    albuterol (PROVENTIL) 2.5 mg /3 mL (0.083 %) nebulizer solution NEBULIZE 1 vial EVERY 6 HOURS AS NEEDED FOR WHEEZING 540 mL 1    albuterol (VENTOLIN HFA) 90 mcg/actuation inhaler INHALE 2  PUFF(S) BY MOUTH EVERY 4 - 6 HOURS AS NEEDED FOR SHORTNESS OF BREATH or WHEEZING 18 g 5    aspirin (ECOTRIN) 325 MG EC tablet Take 325 mg by mouth once daily.      desloratadine (CLARINEX) 5 mg tablet Take 5 mg by mouth once daily.  11    fluticasone (FLONASE) 50 mcg/actuation nasal spray USE TWO SPRAYS IN EACH NOSTRIL ONCE A DAY  6    isosorbide mononitrate (IMDUR) 60 MG 24 hr tablet Take 1 tablet (60 mg total) by mouth once daily. 30 tablet 6    metoprolol tartrate (LOPRESSOR) 25 MG tablet Take 1 tablet (25 mg total) by mouth 2 (two) times daily. 180 tablet 2    PAZEO 0.7 % Drop instill 1 drop IN BOTH EYES daily  11    rosuvastatin (CRESTOR) 20 MG tablet take 1/2 TABLET(S) BY MOUTH ONCE A DAY 30 tablet 3    TRELEGY ELLIPTA 100-62.5-25 mcg DsDv INHALE ONE PUFF INTO THE LUNGS ONCE A DAY  5    diphenhydrAMINE-aluminum-magnesium hydroxide-simethicone-lidocaine HCl 2% Swish and spit 5 mLs every 4 (four) hours as needed. (Patient not taking: Reported on 2019) 180 mL 1    rosuvastatin (CRESTOR) 20 MG tablet TAKE 1/2 tablet BY MOUTH ONCE A DAY (Patient not taking: Reported on 2019) 30 tablet 3     No current facility-administered medications on file prior to visit.      Social History     Socioeconomic History    Marital status: Single     Spouse name: Not on file    Number of children: Not on file    Years of education: Not on file    Highest education level: Not on file   Occupational History    Not on file   Social Needs    Financial resource strain: Not on file    Food insecurity:     Worry: Not on file     Inability: Not on file    Transportation needs:     Medical: Not on file     Non-medical: Not on file   Tobacco Use    Smoking status: Former Smoker     Packs/day: 0.25     Years: 50.00     Pack years: 12.50     Types: Cigarettes     Last attempt to quit: 2017     Years since quittin.8    Smokeless tobacco: Never Used   Substance and Sexual Activity    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     Drug use: No    Sexual activity: Never   Lifestyle    Physical activity:     Days per week: Not on file     Minutes per session: Not on file    Stress: Not on file   Relationships    Social connections:     Talks on phone: Not on file     Gets together: Not on file     Attends Mandaen service: Not on file     Active member of club or organization: Not on file     Attends meetings of clubs or organizations: Not on file     Relationship status: Not on file   Other Topics Concern    Not on file   Social History Narrative    Not on file     Family History   Problem Relation Age of Onset    Cancer Mother         breast    Heart disease Mother     Breast cancer Mother     Cancer Father         lung-smoker     Cancer Sister         lung-smoker     Heart attack Sister     Cancer Maternal Grandmother     Heart disease Maternal Grandmother     Cancer Maternal Grandfather     Heart disease Maternal Grandfather     Cancer Paternal Grandmother     Cancer Paternal Grandfather     Cancer Sister         mets not sure where it started     No Known Problems Sister         Review of Systems  Review of Systems   Constitutional: Negative for fatigue and fever.   HENT: Negative for sore throat and trouble swallowing.    Eyes: Negative for visual disturbance.   Respiratory: Negative for cough and shortness of breath.    Cardiovascular: Negative for chest pain and palpitations.   Gastrointestinal: Negative for abdominal pain, constipation, diarrhea and nausea.   Genitourinary: Negative for difficulty urinating and dysuria.   Musculoskeletal: Negative for back pain.        Right should pain  Lymphedema right arm     Neurological: Negative for dizziness, weakness and headaches.   Hematological: Negative for adenopathy.        Objective:   PHYSICAL EXAM:  BP (!) 147/76 (BP Location: Left arm, Patient Position: Sitting, BP Method: Large (Automatic))   Pulse 62   Ht 5' 3" (1.6 m)   Wt 79.8 " kg (175 lb 14.8 oz)   LMP  (LMP Unknown)   BMI 31.16 kg/m²     Physical Exam   Constitutional: She is oriented to person, place, and time. She appears well-developed.   HENT:   Head: Normocephalic.   Eyes: Pupils are equal, round, and reactive to light.   Neck: Normal range of motion.   Cardiovascular: Normal rate.    Pulmonary/Chest: Effort normal. She exhibits no tenderness and no deformity. Right breast exhibits no inverted nipple, no mass, no nipple discharge, no skin change and no tenderness. Left breast exhibits no inverted nipple, no mass, no nipple discharge, no skin change and no tenderness.   Abdominal: Soft. She exhibits no distension.   Musculoskeletal: She exhibits no edema.   Lymphadenopathy:     She has no cervical adenopathy.     She has no axillary adenopathy.        Right: No supraclavicular adenopathy present.        Left: No supraclavicular adenopathy present.   Neurological: She is alert and oriented to person, place, and time.   Psychiatric: She has a normal mood and affect.         Radiology review: Images personally reviewed by me in the clinic.       Assessment:      Ade Castellano is a 73 y.o. postmenopausal female with recently diagnosed squamous cell carcinoma of the lung and now with right axillary adenopathy.    Plan:   Discuss with Dr. Holliday  Will discuss with Dr. Funez    She had a good response to chemo.  Plan for ALND on 10/14.   Discussed that ideally she should have radiation to the [place] [facility] for the best chance at cure.  Will present at tumor board.

## 2019-10-02 ENCOUNTER — TELEPHONE (OUTPATIENT)
Dept: SURGERY | Facility: CLINIC | Age: 74
End: 2019-10-02

## 2019-10-02 DIAGNOSIS — C80.1 METASTASIS TO LYMPH NODE OF UNKNOWN PRIMARY: Primary | ICD-10-CM

## 2019-10-02 DIAGNOSIS — C77.9 METASTASIS TO LYMPH NODE OF UNKNOWN PRIMARY: Primary | ICD-10-CM

## 2019-10-02 DIAGNOSIS — R59.0 AXILLARY ADENOPATHY: ICD-10-CM

## 2019-10-02 NOTE — TELEPHONE ENCOUNTER
Returned call to patient regarding surgery questions, patient needs cardiac and pulmonology clearances prior to surgery scheduled on 10-14-19, message sent to Dr. Garcia's office rt cardiac clearance and fax sent to Dr. Swetha Katz's office with pulmonology, patient to call to schedule hospital pre-op appointment, pt to be presented at tumor board on 10-8-19, all questions answered at this time

## 2019-10-02 NOTE — TELEPHONE ENCOUNTER
----- Message from Amado Milan RN sent at 10/2/2019  1:05 PM CDT -----  Contact: Patient  Dora,       Saw Dr Christopher's note. Is she planning to do pt's surgery?? Let me know.     Amado      ----- Message -----  From: Breann Bonilla MA  Sent: 10/2/2019  12:58 PM CDT  To: Amado Milan RN    Patient has questions about upcoming surgery.

## 2019-10-04 ENCOUNTER — INFUSION (OUTPATIENT)
Dept: INFUSION THERAPY | Facility: HOSPITAL | Age: 74
End: 2019-10-04
Attending: INTERNAL MEDICINE
Payer: MEDICARE

## 2019-10-04 VITALS
RESPIRATION RATE: 17 BRPM | DIASTOLIC BLOOD PRESSURE: 73 MMHG | TEMPERATURE: 98 F | HEART RATE: 56 BPM | SYSTOLIC BLOOD PRESSURE: 139 MMHG | OXYGEN SATURATION: 96 %

## 2019-10-04 DIAGNOSIS — C34.90 SQUAMOUS CELL CARCINOMA LUNG: Primary | ICD-10-CM

## 2019-10-04 PROCEDURE — A4216 STERILE WATER/SALINE, 10 ML: HCPCS | Mod: HCNC | Performed by: INTERNAL MEDICINE

## 2019-10-04 PROCEDURE — 63600175 PHARM REV CODE 636 W HCPCS: Mod: HCNC | Performed by: INTERNAL MEDICINE

## 2019-10-04 PROCEDURE — 25000003 PHARM REV CODE 250: Mod: HCNC | Performed by: INTERNAL MEDICINE

## 2019-10-04 PROCEDURE — 96523 IRRIG DRUG DELIVERY DEVICE: CPT | Mod: HCNC

## 2019-10-04 RX ORDER — HEPARIN 100 UNIT/ML
500 SYRINGE INTRAVENOUS
Status: DISCONTINUED | OUTPATIENT
Start: 2019-10-04 | End: 2019-10-04 | Stop reason: HOSPADM

## 2019-10-04 RX ORDER — SODIUM CHLORIDE 0.9 % (FLUSH) 0.9 %
10 SYRINGE (ML) INJECTION
Status: DISCONTINUED | OUTPATIENT
Start: 2019-10-04 | End: 2019-10-04 | Stop reason: HOSPADM

## 2019-10-04 RX ADMIN — HEPARIN 500 UNITS: 100 SYRINGE at 08:10

## 2019-10-04 RX ADMIN — Medication 10 ML: at 08:10

## 2019-10-04 NOTE — PLAN OF CARE
Arrived to unit. No complaints voiced. VSS. Tolerated port flush. Pt has next appt. Pt ambulated off unit.

## 2019-10-08 ENCOUNTER — TUMOR BOARD CONFERENCE (OUTPATIENT)
Dept: SURGERY | Facility: CLINIC | Age: 74
End: 2019-10-08

## 2019-10-08 NOTE — PROGRESS NOTES
History of breast cancer    2014 Notable Event     PrevHx:She had an abnormal mammogram 6/4/2014 which  Revealed a round solid mass 6mm in rt breast.  She then underwent U/S guided core bx of rt breast mass on 6/17/2014.  Pathology revealed infiltrating ductal carcinoma Grade 3 with tumor  Present in thin-walled spaces suggestive of lymphatic spaces. Hormone  Receptor status on tumor specimen revealed ER negative 0% ,  AR negative 0% and Her 2 jose maria negative.  She subsequently underwent rt segmental mastectomy and SLN bx  On 7/11/2014. Pathology of rt breast lumpectomy revealed invasive  Ductal carcinoma with micropapillary pattern ( Invasive micropapillary  Ca) with max tumor dimension 11.5mm with suggestion of tumor in   Thin walled spaces c/w lymphovascular involvement. Surgical  Margins free of tumor, grade 3, ER/AR neg , Her 2 jose maria neg   With Provencal Lymph node negative for Neoplasm.   Pathologic staging [id number](i-)  s/p adjuvant RT 9/2014 Pt declined Adjuvant chemo.    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      7/11/2014 Surgery           Procedure Laterality Anesthesia   LUMPECTOMY RIGHT BREAST S/P NEEDLE LOCALIZATION  Right General   BIOPSY-SENTINEL NODE (48764)                3/12/2018 - 7/9/2018 Chemotherapy     Treatment Summary   Plan Name: OP BREAST PACLITAXEL CARBOPLATIN Q3W  Treatment Goal: Palliative  Status: Inactive  Start Date: [No treatment day found]  End Date: [No treatment day found]  Provider: Mildred Holliday MD  Chemotherapy: CARBOplatin (PARAPLATIN) in sodium chloride 0.9% 250 mL chemo infusion, , Intravenous, Clinic/HOD 1 time, 0 of 6 cycles    PACLitaxel (TAXOL) 175 mg/m2 = 330 mg in sodium chloride 0.9% 500 mL chemo infusion, 175 mg/m2, Intravenous, Clinic/HOD 1 time, 0 of 6 cycles    Plan Name: OP NSCLC abraxane (Weekly) + CARBOPLATIN  (AUC)  Treatment Goal: Palliative  Status: Active  Start Date: 3/12/2018  End Date: 7/9/2018 (Planned)  Provider: Mildred Holliday MD  Chemotherapy: PACLitaxel-protein bound (ABRAXANE) 100 mg/m2 = 190 mg in 38 mL infusion, 100 mg/m2 = 190 mg (100 % of original dose 100 mg/m2), Intravenous, Clinic/HOD 1 time, 6 of 6 cycles  Dose modification: 100 mg/m2 (original dose 100 mg/m2, Cycle 1), 170 mg (original dose 100 mg/m2, Cycle 6, Reason: MD Discretion), 180 mg (original dose 100 mg/m2, Cycle 6, Reason: MD Discretion)  Administration: 190 mg (3/12/2018), 180 mg (3/26/2018), 190 mg (4/2/2018), 180 mg (3/19/2018), 190 mg (4/9/2018), 190 mg (4/16/2018), 190 mg (4/23/2018), 190 mg (4/30/2018), 190 mg (5/7/2018), 190 mg (5/14/2018), 190 mg (5/21/2018), 190 mg (6/4/2018), 190 mg (5/28/2018), 190 mg (6/11/2018), 170 mg (6/25/2018), 190 mg (7/2/2018)    CARBOplatin (PARAPLATIN) 620 mg in sodium chloride 0.9% 250 mL chemo infusion, 620 mg (100 % of original dose 620 mg), Intravenous, Clinic/HOD 1 time, 6 of 6 cycles  Dose modification: 620 mg (original dose 620 mg, Cycle 1, Reason: MD Discretion),   (original dose 576 mg, Cycle 2, Reason: MD Discretion),   (original dose 629.4 mg, Cycle 3),   (original dose 629.4 mg, Cycle 4, Reason: MD Discretion),   (original dose 629.4 mg, Cycle 5), 570 mg (original dose 570 mg, Cycle 6, Reason: MD Discretion, Comment: cytopenia)  Administration: 620 mg (3/12/2018), 575 mg (4/2/2018), 630 mg (4/23/2018), 630 mg (5/14/2018), 630 mg (6/4/2018), 570 mg (6/25/2018)          2/21/2019 Imaging Significant Findings     PET scan  Impression       Increased size and irregularity of the right axillary lymph node with increased FDG avidity.  Although this would be atypical in location for lung carcinoma, the increased size and avidity must be concerning for metastatic disease in this patient with history of squamous cell lung cancer.  As a minimum, close correlation/follow-up recommended.            2/21/2019 Imaging Significant Findings     Mammogram  Impression:  Right  Lymph Node: Right axilla lymph node. Assessment: 0 - Incomplete. Ultrasound is recommended.      Left  There is no mammographic evidence of malignancy.          Recommendation:  Breast ultrasound is recommended.         2/26/2019 Imaging Significant Findings     Ultrasound  Impression:  Right  Lymph Node: Right axilla lymph node. Assessment: 4 - Suspicious finding. Biopsy is recommended.      BI-RADS Category:   Right: 4 - Suspicious  Overall: 4 - Suspicious     Recommendation:  Biopsy is recommended. Biopsy of the lymph node measuring 1.4 x 1.1 x 1.3 recommended , the one with the round shape configuration, corresponding to the most recent PET CT finding.   I discussed the findings with the patient. She will be scheduled for her biopsy.            3/11/2019 Biopsy     Impression: Ultrasound-guided biopsy of right breast mass was performed with placement of ribbon. Pathology pending.       3/11/2019 Initial Diagnosis     Invasive Ductal Carcinoma      3/11/2019 Tumor Markers     Estrogen Receptor: Negative  Progesterone Receptor: Negative  HER2: Negative        4/26/2019 Imaging Significant Findings     CT of chest and abdomen    Impression       The previously described right hilar mass is no longer visible.  There is persistent volume loss in the right middle lobe this appears similar to the prior PET-CT February 21, 2019.    Persistent abnormal right axillary node today measuring about 15 mm compared 18 mm prior.  The positioning of the adjacent nodes is slightly different likely accounting for the slight difference in measurement.    Cluster of tree-in-bud nodular densities left lower lobe series 2, image 93.  This may be related to small airways disease though serial follow-up suggested.             6/20/2019 Imaging Significant Findings     PET Scan  Impression       1.  New and worsening hypermetabolic lymph nodes concerning for  metastatic breast cancer as described above.  Tissue sampling would be required for definitive diagnosis.           6/25/2019 Tumor Conference         Systemic therapy, given level 3+ supraclavicular nodes.       9/19/2019 Imaging Significant Findings     Pet Scan  Impression       Favorable response to therapy with interval decrease in size and uptake of several right axillary lymph nodes and a supraclavicular lymph node.  Mild residual activity may indicate viable tumor.           10/8/2019 Tumor Conference       Radiation therapy only. No ALND due to concurrent lung and supraclavicular node.        Metastatic breast cancer    7/1/2019 Initial Diagnosis     Metastatic breast cancer      7/5/2019 -  Chemotherapy     Treatment Summary   Plan Name: OP BREAST AC - DOXORUBICIN CYCLOPHOSPHAMIDE Q3W  Treatment Goal: Curative  Status: Active  Start Date: 7/5/2019  End Date: 9/6/2019  Provider: Mildred Holliday MD  Chemotherapy: DOXOrubicin chemo injection 106 mg, 106 mg (100 % of original dose 105 mg), Intravenous, Clinic/HOD 1 time, 4 of 4 cycles  Dose modification: 105 mg (original dose 105 mg, Cycle 1)  Administration: 106 mg (7/5/2019), 106 mg (7/26/2019), 106 mg (8/16/2019), 106 mg (9/6/2019)  cyclophosphamide (CYTOXAN) 1,053 mg in sodium chloride 0.9% 250 mL chemo infusion, 1,055 mg (100 % of original dose 1,053 mg), Intravenous, Clinic/HOD 1 time, 4 of 4 cycles  Dose modification: 1,053 mg (original dose 1,053 mg, Cycle 1, Reason: MD Discretion)  Administration: 1,055 mg (7/5/2019), 1,055 mg (7/26/2019), 1,055 mg (8/16/2019), 1,055 mg (9/6/2019)

## 2019-10-10 ENCOUNTER — HOSPITAL ENCOUNTER (OUTPATIENT)
Dept: PREADMISSION TESTING | Facility: HOSPITAL | Age: 74
Discharge: HOME | End: 2019-10-10
Attending: INTERNAL MEDICINE
Payer: MEDICARE

## 2019-10-10 ENCOUNTER — OFFICE VISIT (OUTPATIENT)
Dept: CARDIOLOGY | Facility: CLINIC | Age: 74
End: 2019-10-10
Payer: MEDICARE

## 2019-10-10 VITALS
WEIGHT: 183 LBS | SYSTOLIC BLOOD PRESSURE: 133 MMHG | BODY MASS INDEX: 32.43 KG/M2 | HEIGHT: 63 IN | DIASTOLIC BLOOD PRESSURE: 66 MMHG | OXYGEN SATURATION: 96 % | HEART RATE: 67 BPM

## 2019-10-10 DIAGNOSIS — R59.0 AXILLARY ADENOPATHY: Primary | ICD-10-CM

## 2019-10-10 DIAGNOSIS — I10 ESSENTIAL HYPERTENSION: ICD-10-CM

## 2019-10-10 DIAGNOSIS — C80.1 METASTASIS TO LYMPH NODE OF UNKNOWN PRIMARY: Primary | ICD-10-CM

## 2019-10-10 DIAGNOSIS — R59.0 AXILLARY ADENOPATHY: ICD-10-CM

## 2019-10-10 DIAGNOSIS — Z01.810 PREOP CARDIOVASCULAR EXAM: Primary | ICD-10-CM

## 2019-10-10 DIAGNOSIS — C77.9 METASTASIS TO LYMPH NODE OF UNKNOWN PRIMARY: Primary | ICD-10-CM

## 2019-10-10 DIAGNOSIS — I25.10 CORONARY ARTERY DISEASE INVOLVING NATIVE CORONARY ARTERY OF NATIVE HEART WITHOUT ANGINA PECTORIS: ICD-10-CM

## 2019-10-10 PROCEDURE — 99999 PR PBB SHADOW E&M-EST. PATIENT-LVL III: ICD-10-PCS | Mod: PBBFAC,HCNC,, | Performed by: INTERNAL MEDICINE

## 2019-10-10 PROCEDURE — 99203 PR OFFICE/OUTPT VISIT, NEW, LEVL III, 30-44 MIN: ICD-10-PCS | Mod: HCNC,S$GLB,, | Performed by: INTERNAL MEDICINE

## 2019-10-10 PROCEDURE — 1101F PT FALLS ASSESS-DOCD LE1/YR: CPT | Mod: HCNC,CPTII,S$GLB, | Performed by: INTERNAL MEDICINE

## 2019-10-10 PROCEDURE — 1101F PR PT FALLS ASSESS DOC 0-1 FALLS W/OUT INJ PAST YR: ICD-10-PCS | Mod: HCNC,CPTII,S$GLB, | Performed by: INTERNAL MEDICINE

## 2019-10-10 PROCEDURE — 99203 OFFICE O/P NEW LOW 30 MIN: CPT | Mod: HCNC,S$GLB,, | Performed by: INTERNAL MEDICINE

## 2019-10-10 PROCEDURE — 99999 PR PBB SHADOW E&M-EST. PATIENT-LVL III: CPT | Mod: PBBFAC,HCNC,, | Performed by: INTERNAL MEDICINE

## 2019-10-10 PROCEDURE — 93000 ELECTROCARDIOGRAM COMPLETE: CPT | Mod: HCNC,S$GLB,, | Performed by: INTERNAL MEDICINE

## 2019-10-10 PROCEDURE — 93000 EKG 12-LEAD: ICD-10-PCS | Mod: HCNC,S$GLB,, | Performed by: INTERNAL MEDICINE

## 2019-10-10 NOTE — PROGRESS NOTES
CARDIOVASCULAR CONSULTATION    REASON FOR CONSULT:   Ade Castellano is a 74 y.o. female who presents for preoperative risk assessment.      HISTORY OF PRESENT ILLNESS:     Patient is a pleasant 74-year-old lady.  Here for preoperative risk assessment prior to lymph node resection.  She has a past medical history significant for coronary artery disease status post PCI in 2006.  Since then has been angina free.  Had a stress test performed in October of 2018 which was normal.  Denies any anginal sounding chest pains, orthopnea, PND.  Does have mild dyspnea on exertion, which she states has actually been improving since her stress test in 2018.  Denies any other cardiac anginal symptoms.      PAST MEDICAL HISTORY:     Past Medical History:   Diagnosis Date    Acute respiratory failure with hypoxia and hypercapnia 11/29/2017    Angina pectoris     Arthritis     Bell's palsy     left facial weakness    Breast cancer     RIGHT    CAD (coronary artery disease)     Cervical cancer     Chronic bronchitis     COPD (chronic obstructive pulmonary disease)     Dr. Katz    Dental bridge present     Emphysema of lung     H/O colonoscopy 06/2017    due for repeat colonsocopy in 6/2018    History of heart artery stent     Dr. Ortiz  x2 stents    Hyperlipidemia     Hypertension     Lung cancer     Myocardial infarction     SUNITHA (obstructive sleep apnea)     intolerant to mask    Pneumonia     Pneumonia due to other staphylococcus     PUD (peptic ulcer disease)     Severe anemia 6/11/2018    Sleep apnea     Vaginal delivery     x1       PAST SURGICAL HISTORY:     Past Surgical History:   Procedure Laterality Date    ADENOIDECTOMY      BREAST BIOPSY Right     x3    BREAST LUMPECTOMY      BREAST SURGERY      lumpectomy right side     CERVIX SURGERY      cone    COLONOSCOPY N/A 3/17/2017    Procedure: COLONOSCOPY;  Surgeon: Julio Rudd MD;  Location: [facility];  Service: Endoscopy;  Laterality: N/A;     COLONOSCOPY N/A 6/30/2017    Procedure: COLONOSCOPY;  Surgeon: Julio Rudd MD;  Location: [facility] ENDO;  Service: Endoscopy;  Laterality: N/A;    COLONOSCOPY N/A 11/28/2018    Procedure: COLONOSCOPY;  Surgeon: Nura Urbina MD;  Location: [facility] ENDO;  Service: Endoscopy;  Laterality: N/A;    COLONOSCOPY N/A 1/29/2019    Procedure: COLONOSCOPY;  Surgeon: Emmanuel Perez MD;  Location: [facility] ENDO;  Service: Endoscopy;  Laterality: N/A;  confirmed appt-SP    EYE SURGERY Bilateral 06/08/2018    cataract     PORTACATH PLACEMENT Right 01/2018    sweat glands axillary regions Bilateral     TONSILLECTOMY         ALLERGIES AND MEDICATION:   Review of patient's allergies indicates:  No Known Allergies     Medication List           Accurate as of October 10, 2019 11:11 AM. If you have any questions, ask your nurse or doctor.               CONTINUE taking these medications    * albuterol 90 mcg/actuation inhaler  Commonly known as:  VENTOLIN HFA  INHALE 2 PUFF(S) BY MOUTH EVERY 4 - 6 HOURS AS NEEDED FOR SHORTNESS OF BREATH or WHEEZING     * albuterol 2.5 mg /3 mL (0.083 %) nebulizer solution  Commonly known as:  PROVENTIL  NEBULIZE 1 vial EVERY 6 HOURS AS NEEDED FOR WHEEZING     aspirin 325 MG EC tablet  Commonly known as:  ECOTRIN     desloratadine 5 mg tablet  Commonly known as:  CLARINEX     diphenhydrAMINE-aluminum-magnesium hydroxide-simethicone-lidocaine HCl 2%  Swish and spit 5 mLs every 4 (four) hours as needed.     fluticasone propionate 50 mcg/actuation nasal spray  Commonly known as:  FLONASE     influenza 180 mcg/0.5 mL vaccine  Commonly known as:  FLUZONE HIGH-DOSE     isosorbide mononitrate 60 MG 24 hr tablet  Commonly known as:  IMDUR  Take 1 tablet (60 mg total) by mouth once daily.     metoprolol tartrate 25 MG tablet  Commonly known as:  LOPRESSOR  Take 1 tablet (25 mg total) by mouth 2 (two) times daily.     PAZEO 0.7 % Drop  Generic drug:  olopatadine     * rosuvastatin 20 MG tablet  Commonly known  as:  CRESTOR  take 1/2 TABLET(S) BY MOUTH ONCE A DAY     * rosuvastatin 20 MG tablet  Commonly known as:  CRESTOR  TAKE 1/2 tablet BY MOUTH ONCE A DAY     TRELEGY ELLIPTA 100-62.5-25 mcg Dsdv  Generic drug:  fluticasone-umeclidin-vilanter         * This list has 4 medication(s) that are the same as other medications prescribed for you. Read the directions carefully, and ask your doctor or other care provider to review them with you.                SOCIAL HISTORY:     Social History     Socioeconomic History    Marital status: Single     Spouse name: Not on file    Number of children: Not on file    Years of education: Not on file    Highest education level: Not on file   Occupational History    Not on file   Social Needs    Financial resource strain: Not on file    Food insecurity:     Worry: Not on file     Inability: Not on file    Transportation needs:     Medical: Not on file     Non-medical: Not on file   Tobacco Use    Smoking status: Former Smoker     Packs/day: 0.25     Years: 50.00     Pack years: 12.50     Types: Cigarettes     Last attempt to quit: 2017     Years since quittin.9    Smokeless tobacco: Never Used   Substance and Sexual Activity    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     Drug use: No    Sexual activity: Never   Lifestyle    Physical activity:     Days per week: Not on file     Minutes per session: Not on file    Stress: Not on file   Relationships    Social connections:     Talks on phone: Not on file     Gets together: Not on file     Attends [employer] service: Not on file     Active member of club or organization: Not on file     Attends meetings of clubs or organizations: Not on file     Relationship status: Not on file   Other Topics Concern    Not on file   Social History Narrative    Not on file       FAMILY HISTORY:     Family History   Problem Relation Age of Onset    Cancer Mother         breast    Heart disease Mother     Breast cancer Mother   "   Cancer Father         lung-smoker     Cancer Sister         lung-smoker     Heart attack Sister     Cancer Maternal Grandmother     Heart disease Maternal Grandmother     Cancer Maternal Grandfather     Heart disease Maternal Grandfather     Cancer Paternal Grandmother     Cancer Paternal Grandfather     Cancer Sister         mets not sure where it started     No Known Problems Sister        REVIEW OF SYSTEMS:   Review of Systems   Constitution: Positive for malaise/fatigue.   HENT: Negative.    Eyes: Negative.    Cardiovascular: Positive for dyspnea on exertion.   Respiratory: Negative.    Endocrine: Negative.    Hematologic/Lymphatic: Negative.    Skin: Negative.    Musculoskeletal: Negative.    Gastrointestinal: Negative.    Genitourinary: Negative.    Neurological: Negative.    Psychiatric/Behavioral: Negative.    Allergic/Immunologic: Negative.        A 10 point review of systems was performed and all the pertinent positives have been mentioned. Rest of review of systems was negative.        PHYSICAL EXAM:     Vitals:    10/10/19 0946   BP: 133/66   Pulse: 67    Body mass index is 32.41 kg/m².  Weight: 83 kg (182 lb 15.7 oz)   Height: 5' 3" (160 cm)     Physical Exam   Constitutional: She is oriented to person, place, and time. She appears well-developed and well-nourished.   HENT:   Head: Normocephalic.   Eyes: Pupils are equal, round, and reactive to light.   Neck: Normal range of motion. Neck supple.   Cardiovascular: Normal rate and regular rhythm.   Pulmonary/Chest: Effort normal and breath sounds normal.   Abdominal: Soft. Normal appearance and bowel sounds are normal. There is no tenderness.   Musculoskeletal: Normal range of motion.   Neurological: She is alert and oriented to person, place, and time.   Skin: Skin is warm.   Psychiatric: She has a normal mood and affect.         DATA:     Laboratory:  CBC:  Recent Labs   Lab 07/23/19  0524 08/16/19  0727 09/06/19  0734   WBC 9.80 4.48 3.32 " L   Hemoglobin 10.3 L 11.2 L 10.5 L   Hematocrit 32.3 L 35.9 L 33.3 L   Platelets 299 548 H 355 H       CHEMISTRIES:  Recent Labs   Lab 07/20/19  1249 07/21/19  0420 08/16/19  0727 09/06/19  0734   Glucose 131 H 146 H 137 H 138 H   Sodium 137 141 143 142   Potassium 4.2 3.5 4.4 3.6   BUN, Bld 12 8 12 11   Creatinine 0.9 0.7 0.8 0.8   eGFR if African American >60 >60 >60 >60   eGFR if non African American >60 >60 >60 >60   Calcium 9.6 8.6 L 10.1 9.2   Magnesium 1.8  --  1.8 1.8       CARDIAC BIOMARKERS:  Recent Labs   Lab 11/29/17  1153 06/11/18  1345 07/20/19  1249   Troponin I 0.193 H <0.006 0.012       COAGS:  Recent Labs   Lab 11/29/17  0304 02/09/18  1020 07/20/19  1249   INR 1.0 1.0 1.0       LIPIDS/LFTS:  Recent Labs   Lab 07/20/17  0814  03/04/19  0728  07/21/19  0420 08/16/19  0727 09/06/19  0734   Cholesterol 140  --  166  --   --   --   --    Triglycerides 171 H  --  192 H  --   --   --   --    HDL 51  --  56  --   --   --   --    LDL Cholesterol 54.8 L  --  71.6  --   --   --   --    Non-HDL Cholesterol 89  --  110  --   --   --   --    AST 16   < > 18   < > 25 17 20   ALT 16   < > 19   < > 30 16 15    < > = values in this interval not displayed.       Hemoglobin A1C   Date Value Ref Range Status   03/04/2019 6.1 (H) 4.0 - 5.6 % Final     Comment:     ADA Screening Guidelines:  5.7-6.4%  Consistent with prediabetes  >or=6.5%  Consistent with diabetes  High levels of fetal hemoglobin interfere with the HbA1C  assay. Heterozygous hemoglobin variants (HbS, HgC, etc)do  not significantly interfere with this assay.   However, presence of multiple variants may affect accuracy.     03/16/2018 5.8 (H) 4.0 - 5.6 % Final     Comment:     According to ADA guidelines, hemoglobin A1c <7.0% represents  optimal control in non-pregnant diabetic patients. Different  metrics may apply to specific patient populations.   Standards of Medical Care in Diabetes-2016.  For the purpose of screening for the presence of  diabetes:  <5.7%     Consistent with the absence of diabetes  5.7-6.4%  Consistent with increasing risk for diabetes   (prediabetes)  >or=6.5%  Consistent with diabetes  Currently, no consensus exists for use of hemoglobin A1c  for diagnosis of diabetes for children.  This Hemoglobin A1c assay has significant interference with fetal   hemoglobin   (HbF). The results are invalid for patients with abnormal amounts of   HbF,   including those with known Hereditary Persistence   of Fetal Hemoglobin. Heterozygous hemoglobin variants (HbAS, HbAC,   HbAD, HbAE, HbA2) do not significantly interfere with this assay;   however, presence of multiple variants in a sample may impact the %   interference.     02/22/2017 6.4 (H) 4.5 - 6.2 % Final     Comment:     According to ADA guidelines, hemoglobin A1C <7.0% represents  optimal control in non-pregnant diabetic patients.  Different  metrics may apply to specific populations.   Standards of Medical Care in Diabetes - 2016.  For the purpose of screening for the presence of diabetes:  <5.7%     Consistent with the absence of diabetes  5.7-6.4%  Consistent with increasing risk for diabetes   (prediabetes)  >or=6.5%  Consistent with diabetes  Currently no consensus exists for use of hemoglobin A1C  for diagnosis of diabetes for children.         TSH  Recent Labs   Lab 07/20/17  0814 11/29/17  0304   TSH 2.632 0.726       The ASCVD Risk score (Durham FABRICE Jr., et al., 2013) failed to calculate for the following reasons:    The patient has a prior MI or stroke diagnosis           Cardiovascular Testing:    EKG: (personally reviewed tracing)  Sinus bradycardia      ASSESSMENT AND PLAN     Patient Active Problem List   Diagnosis    Benign hypertensive heart disease without heart failure    CAD (coronary artery disease)    History of breast cancer    SUNITHA (obstructive sleep apnea)    Hyperlipidemia LDL goal <70    Old myocardial infarction    Stable angina    Atherosclerosis of aorta     Prediabetes    Personal history of colonic polyps    DNR (do not resuscitate)    COPD (chronic obstructive pulmonary disease)    Squamous cell carcinoma lung    Squamous cell carcinoma of right lung    Metastatic breast cancer    Chronic obstructive pulmonary disease with acute lower respiratory infection    Chemotherapy follow-up examination     Patient with a past medical history of coronary artery disease.  Going for lymph node resection.  Past medical history significant for lung cancer.  Had a stress test done last year in October 2018 which did not show any significant ischemia.  Since then no worsening of symptoms.  No anginal symptoms.  Patient may proceed for lymph node dissection at low to intermediate risk for coronary ischemia.  No further cardiac workup is required at the current time.    Continue current medical therapy.      Follow-up in clinic in 3 months.            Thank you very much for involving me in the care of your patient.  Please do not hesitate to contact me if there are any questions.      Guevara Skelton MD, FACC, [facility]  Interventional Cardiologist, [facility].           This note was dictated with the help of speech recognition software.  There might be un-intended errors and/or substitutions.

## 2019-10-10 NOTE — DISCHARGE INSTRUCTIONS
"Your procedure  is scheduled for __________.    Call [phone] between 2pm and 5pm on _______to find out your arrival time for the day of surgery.    Report to Same Day Surgery Unit at ________ AM on the 2nd floor of the hospital.  Use the front entrance of the hospital.  The front doors of the hospital open promptly at 5:30am.  If you need wheelchair assistance, call [phone] from your cell phone, or call "0" from the courtesy phone in the lobby.    Important instructions:   Do not eat or drink after 12 midnight, including water.  It is okay to brush your teeth.  Do not have gum, candy or mints.     Take only these medications with a small swallow of water on the morning of your surgery ______________    Do not take any diabetic medication on the morning of surgery unless instructed to do so by your doctor or pre op nurse.    Stop taking Aspirin, Ibuprofen, Motrin and Aleve , Fish oil, and Vitamin E for at least 7 days before your surgery. You may use Tylenol unless otherwise instructed by your doctor.          Return to the hospital lab on ____ for additional blood test.   Lab open Monday -Friday   7am-7pm                   Saturday and Sunday  8am to 12 noon                   Closed on holidays.       Prep instructions:  SHOWER   OTHER_____________     Please shower the night before and the morning of your surgery.        Use Hibiclens soap as instructed by your pre op nurse.   Please place clean linens on your bed the night before surgery. Please wear fresh clean clothing after each shower.     No shaving of procedural area at least 4-5 days before surgery due to increased risk of skin irritation and/or possible infection.     Do not wear make- up, including mascara.     You may wear deodorant only.      Do not wear powder, body lotion or perfume/cologne.     Do not wear any jewelry or have any metal on your body.     You will be asked to remove any dentures or partials for the procedure.     Please " bring any documents given to you by your doctor.     If you are going home on the same day of surgery, you must arrange for a family member or a friend to drive you home.  Public transportation is prohibited.  You will not be able to drive home if you were given anesthesia or sedation.     Children under 18 years of age require a parent/guardian present the entire time that they are here.     Wear loose fitting clothes allowing for bandages.     Please leave money and valuables home.       You may bring your cell phone.     Call the doctor if fever or illness should occur before your surgery.    Call [phone] to contact us here if needed.

## 2019-10-11 RX ORDER — ISOSORBIDE MONONITRATE 60 MG/1
TABLET, EXTENDED RELEASE ORAL
Qty: 30 TABLET | Refills: 6 | Status: SHIPPED | OUTPATIENT
Start: 2019-10-11 | End: 2020-01-23

## 2019-10-27 NOTE — PROGRESS NOTES
Subjective:       Patient ID: Ade Castellano is a 74 y.o. female.    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   Pathology showed a 1.15 cm, grade 3 infiltrating ductal carcinoma.  One sentinel lymph node was negative for malignancy.  Margins were negative and the closest margin was 1 cm.  She was staged a little pT1c little pN0 cM0 stage IA.  She declined adjuvant chemo therapy.  She completed post operative radiation therapy at 400 cGy per fraction to 4000 cGy 9/2014         Pt hospitalized 11/2017   Ms. Castellano was admitted for acute on chronic respiratory failure requiring intubation and ventilation. Has large lung mass in right lung with probable post obstructive pneumonia resulting in COPD exacerbation. But also, patient had ran out of home O2 prior to onset of symptoms, likely contributing to presentation. Initiated on broad spectrum abx, IV steroids, duo-nebs and pulmonology consulted for ventilator co-management. Successfully extubated to BiPAP on 11/30. Then de-escalated to low flow nasal cannula. Abx tailored to augmentin for broad coverage, including anaerobic bacteria /     CTA chest 11/29/2017 revealed   Large right hilar mass with involvement of adjacent segmental pulmonary arterial branches and bronchi with associated postobstructive atelectasis and volume loss in the right middle lobe with adjacent ground glass opacity.  Findings highly concerning for underlying neoplastic process. Mediastinal and axillary adenopathy, with a right axillary lymph node measuring up to 2.0 cm in short axis diameter. No definite evidence of pulmonary thromboembolism.    She is followed by Pulmonology   She underwent  rt axillary LN bx at outside facility- on 1/16/2018 at [facility]  Pathology revealed metastatic poorly differentiated carcinoma of unknown primary site  TTF-1 negative, napsin negative  , cytokeratin 7 positive, cytokeratin 20 negative, p63 negative, cytokeratin 5/6 focal dim positivity    She next underwent lung bx at [facility] l1/31/2018   Pathology revealed benign lung tissue    She underwent Repeat Right Lung Bx 2/14/2018 Pathology reveals Squamous cell carcinoma  PD-L1 10% low expression  EGFR NEG  ALK NEG  BRAF NEG      Outside slides axillary LN specimen  requested for review/comparison to lung bx findings     1) Right Lung Bx 2/14/2018  Supplemental Diagnosis  Immunohistochemical stains show strong nuclear staining for p63 in essentially all tumor cells and very strong  cytoplasmic and membrane staining for CK5/6, also in essentially all tumor cells. TTF-1 and CK7 are negative  within tumor cells but do stain native pulmonary elements present within the biopsy. A stain for mucicarmine is  negative. Positive and negative controls function appropriately.  Final diagnosis: Specimen submitted as right lung biopsy  -Squamous cell carcinoma  (Electronically Signed: 2018-02-20 09:12:07 )  Diagnosed by: Phnog Thompson  FINAL PATHOLOGIC DIAGNOSIS  Fragments of pulmonary parenchyma (submitted as right lung biopsy):    FINAL PATHOLOGIC DIAGNOSIS 1. Lymph node, right axilla, biopsy, review of 10 outside slides [facility], JS [phone],  collected January 11, 2018: Metastatic poorly differentiated carcinoma (see comment).  The histologic section shows fibrous stroma infiltrated by nest of poorly differentiated malignant cells including  occasional dyskeratotic cells morphologically suggestive of metastatic squamous cell carcinoma.  Immunohistochemical stains are nonspecific; the cells are positive for cytokeratin 7 and cytokeratin 5/6 (focal) and  negative for cytokeratin 20, TTF-1, p63, GCDFP,  mammoglobin, and CEA      PET/CT  4/19/2018 revealed there has been a excellent response to therapy.  At least 90% reduction in malignant activity.    She s/p  cycle 6 of carboplatin/Abraxane completed 7/2018        PET/CT 2/21/2019 increased size and irregularity of the right axillary lymph node with increased FDG avidity     MAMMO/US rt breast 2/2019 revealed suspicious findings rt axilla LN.  Biopsy of the lymph node measuring 1.4 x 1.1 x 1.3 recommended    Pathology 3/11/2019   FINAL PATHOLOGIC DIAGNOSIS  Right axilla, mass, core biopsy:  Positive for poorly differentiated carcinoma.  he staining profile of the current axillary mass match more closely with the previous  breast carcinoma (due to the p63 negativity in both the 2014 breast cancer and the current axillary mass lesion but  p63 positivity in the lung mass from 2018).  This staining profile, together with the comparison with the patient's prior breast tumor and prior lung tumor supports  a diagnosis of poorly differentiated carcinoma and the malignancy appears morphologically similar to the prior  malignancies from both sites, but the staining profile in this small sample from the axillary mass more closely  correlates with the prior breast malignancy. Pathologic subclassification of this malignancy's primary location is not  definitive and clinical correlation is recommended definitively decide on possible primary location in this patient's  axillary mass sample.  Supplemental (2):  Additional immunohistochemical staining for progesterone receptor and HER2 are completed per clinician request  with following results:  Progesterone receptor: Negative; 0% nuclear tumor cell staining.  HER2: Negative; stain score = 0.  Supplemental (3):    Slides sent to [facility] for outside review  It was determined that agree with  interpretation of this case. In my opinion, the lung tumor corresponds to a squamous cell  carcinoma and appears to be unrelated to the  invasive ductal carcinoma of the breast. The axillary mass, in my  opinion, corresponds to metastases of the breast primary. Although it is a poorly differentiated carcinoma, the  immunophenotype is most consistent with the one that the breast primary exhibited, and is inconsistent with a  metastatic squamous cell carcinoma. I      Further testing sent for cancer type ID also sent and results have revealed molecular diagnosis testing revealed head and neck salivary gland carcinoma probability 84% breast adenocarcinoma could not be excluded with a 12% probability      She is s/p AC q21d x 4 cycles completed 9/6/2019     Doing well  Pt reports she feels good  Appetite and weight stable  She has diffuse arthralgias-stable  No  swelling in rt arm    No hemoptysis   No bleeding-urinary/rectal/nasal   No SOB/CP       PET/CT 9/19/2019 shows disease response with interval decrease in size and uptake of several right axillary lymph nodes and a supraclavicular lymph node.  Mild residual activity may indicate viable tumor.  No new hypermetabolic findings worrisome for malignancy.    Pt followed by Rad/Onc  She was presented at [facility] tumor board and it was determined to proceed with radiation only          PrevHx:She had an abnormal mammogram 6/4/2014 which  Revealed a round solid mass 6mm in rt breast.  She then underwent U/S guided core bx of rt breast mass on 6/17/2014.  Pathology revealed infiltrating ductal carcinoma Grade 3 with tumor  Present in thin-walled spaces suggestive of lymphatic spaces. Hormone  Receptor status on tumor specimen revealed ER negative 0% ,  IL negative 0% and Her 2 jose maria negative.  She subsequently underwent rt segmental mastectomy and SLN bx  On 7/11/2014. Pathology of rt breast lumpectomy revealed invasive  Ductal carcinoma with micropapillary pattern ( Invasive micropapillary  Ca) with max tumor dimension 11.5mm with suggestion of tumor in   Thin walled spaces c/w lymphovascular  "involvement. Surgical  Margins free of tumor, grade 3, ER/[place] neg , Her 2 jose maria neg   With [place] Lymph node negative for Neoplasm.   Pathologic staging [id number](i-)  Her mother was diagnosed with breast cancer in her 50's/  She has no family history of ovarian cancer.           Review of Systems   Constitutional: Negative for appetite change, fever and unexpected weight change.   HENT: Negative for mouth sores and rhinorrhea.    Eyes: Negative for visual disturbance.   Respiratory: Negative for cough and shortness of breath (mild).    Cardiovascular: Negative for chest pain.   Gastrointestinal: Negative for abdominal pain and diarrhea.   Genitourinary: Negative for frequency.   Musculoskeletal: Negative for back pain.   Skin: Negative for rash.   Neurological: Negative for dizziness and headaches.   Hematological: Negative for adenopathy.   Psychiatric/Behavioral: The patient is not nervous/anxious.        Objective:        Vitals:    10/28/19 0813   BP: 137/69   BP Location: Left arm   Patient Position: Sitting   BP Method: Medium (Automatic)   Pulse: 63   Temp: 97.9 °F (36.6 °C)   TempSrc: Oral   SpO2: 96%   Weight: 79 kg (174 lb 2.6 oz)   Height: 5' 3" (1.6 m)         Physical Exam   Constitutional: She is oriented to person, place, and time. She appears well-developed and well-nourished.   HENT:   Head: Normocephalic.   Mouth/Throat: Oropharynx is clear and moist. No oropharyngeal exudate.   Eyes: Pupils are equal, round, and reactive to light. Conjunctivae and lids are normal. No scleral icterus.   Neck: Normal range of motion. Neck supple. No thyromegaly present.   Cardiovascular: Normal rate, regular rhythm and normal heart sounds.   No murmur heard.  Pulmonary/Chest: Effort normal and breath sounds normal. She has no wheezes.   Abdominal: Soft. Bowel sounds are normal. There is no tenderness. There is no rebound and no guarding.   Musculoskeletal: Normal range of motion. She exhibits no edema or tenderness. "   Lymphadenopathy:     She has no cervical adenopathy.     She has no axillary adenopathy.        Right: No supraclavicular adenopathy present.        Left: No supraclavicular adenopathy present.   Neurological: She is alert and oriented to person, place, and time. No cranial nerve deficit. Coordination normal.   Skin: Skin is warm and dry. No ecchymosis, no petechiae and no rash noted. No erythema.   Psychiatric: She has a normal mood and affect.             CT a/p w/contrast 11/29/2018   1. No acute abnormality identified within the abdomen and pelvis.    2.  There are a few nonspecific prominent lymph nodes in the upper abdomen including a periesophageal lymph node which measures 1.0 cm in short axis diameter.    3.  Significant abdominal aortic atherosclerosis and abdominal aorta ectasia.    4.  Additional findings as above.    PET/CT 1/26/2018   1.  Intense FDG uptake within the known right infrahilar lung mass, compatible with malignancy.  It is unclear if this represents primary lung malignancy or metastatic breast cancer.    2.  Intensely hypermetabolic right axillary, retropectoral, and lower right cervical lymph nodes, compatible with metastases.    3.  Abnormal FDG uptake along right breast skin thickening, new from prior chest CTA and mammogram.  This may relate to localized edema/inflammation, though correlation with physical exam and mammography are recommended to exclude underlying inflammatory carcinoma.      MRI Brain w w/out contrast 2/9/2018  1.  No evidence of intracranial metastases.  2.  Sinus disease       Rt axillary LN bx at outside facility- on 1/16/2018 at [facility]  Pathology revealed metastatic poorly differentiated carcinoma of unknown primary  site 1/16/2018 at [facility]  TTF-1 negative, napsin negative  , cytokeratin 7 positive, cytokeratin 20 negative, p63 negative, cytokeratin 5/6 focal dim positivity    LUNG BIOPSY 1/31/2018  Pathology revealed benign  lung tissue         SPECIMEN  1) Right Lung Bx 2/14/2018  Supplemental Diagnosis  Immunohistochemical stains show strong nuclear staining for p63 in essentially all tumor cells and very strong  cytoplasmic and membrane staining for CK5/6, also in essentially all tumor cells. TTF-1 and CK7 are negative  within tumor cells but do stain native pulmonary elements present within the biopsy. A stain for mucicarmine is  negative. Positive and negative controls function appropriately.  Final diagnosis: Specimen submitted as right lung biopsy  -Squamous cell carcinoma  (Electronically Signed: 2018-02-20 09:12:07 )  Diagnosed by: Phong Thompson  FINAL PATHOLOGIC DIAGNOSIS  Fragments of pulmonary parenchyma (submitted as right lung biopsy):    FINAL PATHOLOGIC DIAGNOSIS 1. Lymph node, right axilla, biopsy, review of 10 outside slides [facility], JS [phone],  collected January 11, 2018: Metastatic poorly differentiated carcinoma (see comment).  The histologic section shows fibrous stroma infiltrated by nest of poorly differentiated malignant cells including  occasional dyskeratotic cells morphologically suggestive of metastatic squamous cell carcinoma.  Immunohistochemical stains are nonspecific; the cells are positive for cytokeratin 7 and cytokeratin 5/6 (focal) and  negative for cytokeratin 20, TTF-1, p63, GCDFP, mammoglobin, and CEA        PET/CT 2/21/2019  Increased size and irregularity of the right axillary lymph node with increased FDG avidity.  Although this would be atypical in location for lung carcinoma, the increased size and avidity must be concerning for metastatic disease in this patient with history of squamous cell lung cancer.     US Rt breast and mammo 2/26/2019  Impression:  Right  Lymph Node: Right axilla lymph node. Assessment: 4 - Suspicious finding. Biopsy is recommended.      BI-RADS Category:   Right: 4 - Suspicious  Overall: 4 - Suspicious     Recommendation:  Biopsy is  recommended. Biopsy of the lymph node measuring 1.4 x 1.1 x 1.3 recommended , the one with the round shape configuration, corresponding to the most recent PET CT finding.         Pathology 3/11/2019   FINAL PATHOLOGIC DIAGNOSIS  Right axilla, mass, core biopsy:  Positive for poorly differentiated carcinoma.  See comment.  Comment:  The biopsy from the right axilla mass shows a poorly differentiated carcinoma in background lymphoid tissue  with enlarged cells showing increased nuclear size, prominent nucleoli, moderate amounts of cytoplasm and mitotic  figures. Immunostains are completed and reveal the tumor cells to stain positively with cytokeratin AE 1/AE3, CK  5/6 and CK 7. The tumor cells are negative for CK 20, Estrogen receptor, p63 and TTF-1. All stains have  satisfactory positive and negative controls. The patient's prior cases will be reviewed and an additional stain for  JUAREZ-3 is pending in an attempt to pinpoint the primary site of this malignancy and results will follow in a  supplemental report.      Supplemental Diagnosis  3/11/2019  The current axillary mass is compared to the patient's prior right lung biopsy (case number [id number]) and the  current axillary mass is morphologically similar to the lung malignancy. Also, the current axillary mass is compared  to the patient's prior breast resection (Case number [id number]) and appears similar as well. P63 is negative in the  [id number] tumor and CK 5/6 shows scattered positive staining in the [id number] tumor.  Immunostain for JUAREZ-3 is completed and shows only rare weak to moderate staining within the tumor cell nuclei  with satisfactory positive and negative controls. This stain is positive in the surrounding lymphocytes. This staining  pattern is non-specific and does not definitively differentiate between a lung and breast primary malignancy in this  right axilla mass biopsy. The staining profile of the current axillary mass match more closely with  the previous  breast carcinoma (due to the p63 negativity in both the 2014 breast cancer and the current axillary mass lesion but  p63 positivity in the lung mass from 2018).  This staining profile, together with the comparison with the patient's prior breast tumor and prior lung tumor supports  a diagnosis of poorly differentiated carcinoma and the malignancy appears morphologically similar to the prior  malignancies from both sites, but the staining profile in this small sample from the axillary mass more closely  correlates with the prior breast malignancy. Pathologic subclassification of this malignancy's primary location is not  definitive and clinical correlation is recommended definitively decide on possible primary location in this patient's  axillary mass sample.  Supplemental (2):  Additional immunohistochemical staining for progesterone receptor and HER2 are completed per clinician request  with following results:  Progesterone receptor: Negative; 0% nuclear tumor cell staining.  HER2: Negative; stain score = 0.  Supplemental (3):  [place] DIAGNOSIS:  FINAL DIAGNOSIS  Breast, right, needle core biopsy ([id number]; 06/17/2014): Invasive ductal carcinoma, Shiro grade III (of  III), with focal micropapillary features.  Immunohistochemical stains performed at the referring institution show that the tumor cells have the following  phenotype: - Estrogen receptor: Negative (0% tumor cells staining). - Progesterone receptor: Negative (0% tumor  cells staining). - HER2: Negative (score 0).  Lymph nodes, right, sentinel biopsy and lumpectomy ([id number]; 07/11/2014):  1. Right sentinel lymph node: A single (1) lymph node is negative for metastatic carcinoma.  Immunohistochemical stains performed by the referring institution and reviewed at [facility] for cytokeratin are  negative, confirming the diagnosis.  2. Right breast: Invasive ductal carcinoma with micropapillary features, per report 11.5 mm in greatest  dimension.  Foci suspicious for lymphovascular invasion identified. Margins of resection are free of tumor.  Immunohistochemical stains performed by the referring institution and reviewed at [facility] show that the tumor  cells are negative for p63 and show patchy positivity for CK 5/6.  Lung, right, needle core biopsy ([id number]; 02/14/2018): Squamous cell carcinoma.    Immunohistochemical stains performed by the referring institution show the tumor cells are strongly positive for p63  and CK 5/6, while negative for TTF-1 and CK7. These result support the diagnosis. Axilla, right, needle core biopsy  ([id number]; 03/11/2019): Lymph node positive for metastatic carcinoma, most consistent with breast primary  (see comment).  Immunohistochemical stains performed by the referring institution and reviewed at [facility] show that the tumor  cells are negative for p63, focally positive for CK 5/6, focally and weakly positive for JUAREZ-3, and strongly positive  for CK7. They are also negative for estrogen receptor, progesterone receptor, and HER2 JAM (score 0).  COMMENT:  Thank you for allowing me to review the case of this 73-year-old lady who recently underwent needle core biopsies  of a right axillary mass and who has a previous diagnosis of an invasive ductal carcinoma of the right breast, as well  as a squamous cell carcinoma of the lung. I am reviewing this case because of my special interest in breast  pathology.  I agree with your interpretation of this case. In my opinion, the lung tumor corresponds to a squamous cell  carcinoma and appears to be unrelated to the invasive ductal carcinoma of the breast. The axillary mass, in my  opinion, corresponds to metastases of the breast primary. Although it is a poorly differentiated carcinoma, the  immunophenotype is most consistent with the one that the breast primary exhibited, and is inconsistent with a  metastatic squamous cell carcinoma. I did share the case with  Dr. Anabel Stone, one of our pulmonary pathologists,  and she concurs with this interpretation.  Note: Report attached.  Performing location:  [facility]  [address] [place] [address]    CT neck/chest/abd/pelvis w/contrast 4/26/2019   The previously described right hilar mass is no longer visible.  There is persistent volume loss in the right middle lobe this appears similar to the prior PET-CT February 21, 2019.    Persistent abnormal right axillary node today measuring about 15 mm compared 18 mm prior.  The positioning of the adjacent nodes is slightly different likely accounting for the slight difference in measurement.    Cluster of tree-in-bud nodular densities left lower lobe series 2, image 93.  This may be related to small airways disease though serial follow-up suggested.      PET/CT 6/20/2019   New and worsening hypermetabolic lymph nodes concerning for metastatic breast cancer as described above.  Tissue sampling would be required for definitive diagnosis.      2-D echo 6/27/2019   · Normal left ventricular systolic function. The estimated ejection fraction is 55%  · Concentric left ventricular remodeling.  · Normal LV diastolic function.  · Normal right ventricular systolic function.  · Moderate left atrial enlargement.  · Mild right atrial enlargement.  · Mild aortic regurgitation.  · Mild mitral regurgitation.  · Mild tricuspid regurgitation.  · Normal central venous pressure (3 mm Hg).  · The estimated PA systolic pressure is 25 mm Hg      PET/CT 9/19/2019   Favorable response to therapy with interval decrease in size and uptake of several right axillary lymph nodes and a supraclavicular lymph node.  Mild residual activity may indicate viable tumor.    No new hypermetabolic findings worrisome for malignancy.      Assessment:       1. Recurrent breast cancer, unspecified laterality    2. Axillary lymphadenopathy    3.  History of Squamous cell carcinoma of  right lung    4. Chronic obstructive pulmonary disease with acute lower respiratory infection        Plan:   ECOG 0  1-4    Pt with hx of Stage 1A invasive ductal carcinoma rt breast s/p rt segmental mastectomy and SLN bx 7/11/2014 ER/[place] neg HER 2 jose maria neg [id number](i-).  S/p adjuvant RT completed 9/2014 Pt declined adjuvant chemo  Follow-up Mammo 6/12/2017 No mammographic evidence of malignancy.  Pt  hospitalized 11/2017 with acute-on-chronic  resp failure  Abnormal CT imaging revealing Large right hilar mass with involvement of  adjacent segmental pulmonary arterial branches and bronchi with associated postobstructive atelectasis  and involvement of mediastinal and axillary adenopathy   S/p rt axillary LN bx ( outside facility) - metastatic poorly differentiated carcinoma of unknown primary  site  status post  lung biopsy 1/31/2017 -benign lung tissue  PET/CT 1/26/2018   Intense FDG uptake within the known right infrahilar lung mass, compatible with malignancy.   Intensely hypermetabolic right axillary, retropectoral, and lower right cervical lymph nodes, compatible with metastases.  Abnormal FDG uptake along right breast skin thickening, new from prior chest CTA and mammogram.    Repeat lung bx 2/14/2018 revealed Squamous Cell CA lung  PD-L1 10% low expression  EGFR NEG  ALK NEG    Pt treated for advanced squamous cell CA of lung   She s/p  cycle 6 of carboplatin/Abraxane Day1 completed 7/2/2018 ( day 15 held due to prolonged cytopenias)  She completed therapy with near CR     PET/CT 2/21/2019 showed increased size and irregularity of the right axillary lymph node with increased FDG avidity    Recent MAMMO/US rt breast reveals suspicious findings rt axilla LN.  Biopsy of the lymph node measuring 1.4 x 1.1 x 1.3     Pt s/p  rt axillary LN bx  Pathology 3/11/2019   FINAL PATHOLOGIC DIAGNOSIS  Right axilla, mass, core biopsy:  Positive for poorly differentiated carcinoma.- likely breast primary   ERneg PRneg Her 2  neg    Outside review of specimen(s) revealed  lung tumor corresponds to a squamous cell carcinoma and appears to be unrelated to the invasive ductal carcinoma of the breast. It was determined The axillary mass, in my opinion, corresponded  to metastases of the breast primary. Although it is a poorly differentiated carcinoma, theimmunophenotype is most consistent with the one that the breast primary exhibited, and is inconsistent with a metastatic squamous cell carcinoma.     CT imaging 4/26/2019 persistent rt axillary LAD, no other sites of disease     Pf followed by Rad/Onc regarding RT   It was determined to discuss potential surgical intervention involving alnd  Plan to discuss with breast surgeon , Dr. Alexandra potential surgical intervention       Lymph node biopsy 3/11/2019 Right axilla, mass, core biopsy:  Positive for poorly differentiated carcinoma.   favor breast primary   Pt was discussed at multidisciplinary tumor board  D/w Pathologist    PET/CT 6/20/2019  New and worsening hypermetabolic lymph nodes concerning for metastatic breast cancer     Previously Discussed  imaging findings in detail with patient which reveals new and worsening hypermetabolic melena metabolic lymph nodes including hypermetabolic supraclavicular node.  Therefore, at treatment option will be systemic chemotherapy in light of the recent imaging findings.  She will be followed by her surgical oncologist Dr. Christopher      IT was determined to proceed with systemic chemotherapy   She underwent systemic chemotherapy with AC  S/p  AC [id number] x 3 wks x 4 wks completed 9/6/2019   ( pt has not received in past)      PET/CT 9/19/2019 shows disease response with interval decrease in size and uptake of several right axillary lymph nodes and a supraclavicular lymph node.  Mild residual activity may indicate viable tumor.  No new hypermetabolic findings worrisome for malignancy.       Discussed radiographic findings with pt which shows response to  therapy  Pt with disease good  response to chemo and no other sites of disease  Pt followed by  Breast Surgery and plan was  for possible   ALND     Pt with Recurrent cancer in her right axilla and right supraclavicular fossa.   She completed chemotherapy 9/6/2019  and has had a near complete response.  It was determined  Radiation will be given to the original areas of hypermetabolic activity in the right axilla and right supraclavicular region      Pt was presented at multidisciplinary tumor board  It was determined to proceed with Radiation therapy only. No ALND due to concurrent lung and supraclavicular node    Follow-up with Rad/Onc this week to begin RT      2-D echo reviewed and LVEF 55%   Hb 10.5g/dl-stable  Cont to monitor     Follow-up  1month      Advance Care Planning     Power of   I initiated the process of advance care planning today and explained the importance of this process to the patient.  I introduced the concept of advance directives to the patient, as well. Then the patient received detailed information about the importance of designating a Health Care Power of  (HCPOA). She was also instructed to communicate with this person about their wishes for future healthcare, should she become sick and lose decision-making capacity. The patient has previously appointed a HCPOA. After our discussion, the patient has decided to complete a HCPOA and has appointed her son and niece and  I spent a total time of 16 minutes discussing this issue with the patient.         Cc: Swetha Katz M.D.         MD Tory Roberson MD Raymond Gould, MD

## 2019-10-28 ENCOUNTER — OFFICE VISIT (OUTPATIENT)
Dept: HEMATOLOGY/ONCOLOGY | Facility: CLINIC | Age: 74
End: 2019-10-28
Payer: MEDICARE

## 2019-10-28 VITALS
SYSTOLIC BLOOD PRESSURE: 137 MMHG | HEART RATE: 63 BPM | BODY MASS INDEX: 30.86 KG/M2 | HEIGHT: 63 IN | TEMPERATURE: 98 F | WEIGHT: 174.19 LBS | OXYGEN SATURATION: 96 % | DIASTOLIC BLOOD PRESSURE: 69 MMHG

## 2019-10-28 DIAGNOSIS — J44.0 CHRONIC OBSTRUCTIVE PULMONARY DISEASE WITH ACUTE LOWER RESPIRATORY INFECTION: ICD-10-CM

## 2019-10-28 DIAGNOSIS — C34.91 SQUAMOUS CELL CARCINOMA OF RIGHT LUNG: ICD-10-CM

## 2019-10-28 DIAGNOSIS — R59.0 AXILLARY LYMPHADENOPATHY: ICD-10-CM

## 2019-10-28 DIAGNOSIS — C50.919 RECURRENT BREAST CANCER, UNSPECIFIED LATERALITY: Primary | ICD-10-CM

## 2019-10-28 PROBLEM — Z09 CHEMOTHERAPY FOLLOW-UP EXAMINATION: Status: RESOLVED | Noted: 2019-07-25 | Resolved: 2019-10-28

## 2019-10-28 PROCEDURE — 99499 UNLISTED E&M SERVICE: CPT | Mod: HCNC,S$GLB,, | Performed by: INTERNAL MEDICINE

## 2019-10-28 PROCEDURE — 99999 PR PBB SHADOW E&M-EST. PATIENT-LVL III: ICD-10-PCS | Mod: PBBFAC,HCNC,, | Performed by: INTERNAL MEDICINE

## 2019-10-28 PROCEDURE — 1101F PT FALLS ASSESS-DOCD LE1/YR: CPT | Mod: HCNC,CPTII,S$GLB, | Performed by: INTERNAL MEDICINE

## 2019-10-28 PROCEDURE — 1101F PR PT FALLS ASSESS DOC 0-1 FALLS W/OUT INJ PAST YR: ICD-10-PCS | Mod: HCNC,CPTII,S$GLB, | Performed by: INTERNAL MEDICINE

## 2019-10-28 PROCEDURE — 99214 OFFICE O/P EST MOD 30 MIN: CPT | Mod: HCNC,S$GLB,, | Performed by: INTERNAL MEDICINE

## 2019-10-28 PROCEDURE — 99499 RISK ADDL DX/OHS AUDIT: ICD-10-PCS | Mod: HCNC,S$GLB,, | Performed by: INTERNAL MEDICINE

## 2019-10-28 PROCEDURE — 99999 PR PBB SHADOW E&M-EST. PATIENT-LVL III: CPT | Mod: PBBFAC,HCNC,, | Performed by: INTERNAL MEDICINE

## 2019-10-28 PROCEDURE — 99214 PR OFFICE/OUTPT VISIT, EST, LEVL IV, 30-39 MIN: ICD-10-PCS | Mod: HCNC,S$GLB,, | Performed by: INTERNAL MEDICINE

## 2019-10-30 ENCOUNTER — INFUSION (OUTPATIENT)
Dept: INFUSION THERAPY | Facility: HOSPITAL | Age: 74
End: 2019-10-30
Attending: INTERNAL MEDICINE
Payer: MEDICARE

## 2019-10-30 VITALS
TEMPERATURE: 98 F | DIASTOLIC BLOOD PRESSURE: 91 MMHG | RESPIRATION RATE: 17 BRPM | SYSTOLIC BLOOD PRESSURE: 151 MMHG | HEART RATE: 64 BPM | OXYGEN SATURATION: 98 %

## 2019-10-30 DIAGNOSIS — C34.90 SQUAMOUS CELL CARCINOMA LUNG: Primary | ICD-10-CM

## 2019-10-30 PROCEDURE — A4216 STERILE WATER/SALINE, 10 ML: HCPCS | Mod: HCNC | Performed by: INTERNAL MEDICINE

## 2019-10-30 PROCEDURE — 25000003 PHARM REV CODE 250: Mod: HCNC | Performed by: INTERNAL MEDICINE

## 2019-10-30 PROCEDURE — 63600175 PHARM REV CODE 636 W HCPCS: Mod: HCNC | Performed by: INTERNAL MEDICINE

## 2019-10-30 PROCEDURE — 96523 IRRIG DRUG DELIVERY DEVICE: CPT | Mod: HCNC

## 2019-10-30 RX ORDER — HEPARIN 100 UNIT/ML
500 SYRINGE INTRAVENOUS
Status: DISCONTINUED | OUTPATIENT
Start: 2019-10-30 | End: 2019-10-30 | Stop reason: HOSPADM

## 2019-10-30 RX ORDER — SODIUM CHLORIDE 0.9 % (FLUSH) 0.9 %
10 SYRINGE (ML) INJECTION
Status: DISCONTINUED | OUTPATIENT
Start: 2019-10-30 | End: 2019-10-30 | Stop reason: HOSPADM

## 2019-10-30 RX ADMIN — HEPARIN 500 UNITS: 100 SYRINGE at 09:10

## 2019-10-30 RX ADMIN — Medication 10 ML: at 09:10

## 2019-10-30 NOTE — PLAN OF CARE
Patient tolerated monthly portacath flush well. VSS. Denies any new or worsening symptoms at this time. Patient is scheduled to begin xRT at [facility] tomorrow 10/30/19. She received discharge instructions and verbalized understanding. Ambulated unassisted off unit by self. Patient in NAD at time of discharge.

## 2019-11-22 RX ORDER — METOPROLOL TARTRATE 25 MG/1
25 TABLET, FILM COATED ORAL 2 TIMES DAILY
Qty: 180 TABLET | Refills: 2 | Status: SHIPPED | OUTPATIENT
Start: 2019-11-22 | End: 2020-09-04

## 2019-12-02 ENCOUNTER — OFFICE VISIT (OUTPATIENT)
Dept: HEMATOLOGY/ONCOLOGY | Facility: CLINIC | Age: 74
End: 2019-12-02
Payer: MEDICARE

## 2019-12-02 ENCOUNTER — LAB VISIT (OUTPATIENT)
Dept: LAB | Facility: HOSPITAL | Age: 74
End: 2019-12-02
Attending: INTERNAL MEDICINE
Payer: MEDICARE

## 2019-12-02 ENCOUNTER — INFUSION (OUTPATIENT)
Dept: INFUSION THERAPY | Facility: HOSPITAL | Age: 74
End: 2019-12-02
Attending: INTERNAL MEDICINE
Payer: MEDICARE

## 2019-12-02 VITALS
SYSTOLIC BLOOD PRESSURE: 143 MMHG | TEMPERATURE: 98 F | OXYGEN SATURATION: 96 % | BODY MASS INDEX: 32.43 KG/M2 | WEIGHT: 183 LBS | HEART RATE: 57 BPM | HEIGHT: 63 IN | DIASTOLIC BLOOD PRESSURE: 85 MMHG

## 2019-12-02 DIAGNOSIS — R59.0 AXILLARY LYMPHADENOPATHY: ICD-10-CM

## 2019-12-02 DIAGNOSIS — C34.91 SQUAMOUS CELL CARCINOMA OF RIGHT LUNG: ICD-10-CM

## 2019-12-02 DIAGNOSIS — J44.0 CHRONIC OBSTRUCTIVE PULMONARY DISEASE WITH ACUTE LOWER RESPIRATORY INFECTION: ICD-10-CM

## 2019-12-02 DIAGNOSIS — C50.919 RECURRENT BREAST CANCER, UNSPECIFIED LATERALITY: ICD-10-CM

## 2019-12-02 DIAGNOSIS — C34.90 SQUAMOUS CELL CARCINOMA LUNG: Primary | ICD-10-CM

## 2019-12-02 DIAGNOSIS — C50.919 RECURRENT BREAST CANCER, UNSPECIFIED LATERALITY: Primary | ICD-10-CM

## 2019-12-02 LAB
ALBUMIN SERPL BCP-MCNC: 4.3 G/DL (ref 3.5–5.2)
ALP SERPL-CCNC: 45 U/L (ref 55–135)
ALT SERPL W/O P-5'-P-CCNC: 19 U/L (ref 10–44)
ANION GAP SERPL CALC-SCNC: 8 MMOL/L (ref 8–16)
AST SERPL-CCNC: 19 U/L (ref 10–40)
BASOPHILS # BLD AUTO: 0.04 K/UL (ref 0–0.2)
BASOPHILS NFR BLD: 1.1 % (ref 0–1.9)
BILIRUB SERPL-MCNC: 0.4 MG/DL (ref 0.1–1)
BUN SERPL-MCNC: 14 MG/DL (ref 8–23)
CALCIUM SERPL-MCNC: 9.8 MG/DL (ref 8.7–10.5)
CHLORIDE SERPL-SCNC: 106 MMOL/L (ref 95–110)
CO2 SERPL-SCNC: 26 MMOL/L (ref 23–29)
CREAT SERPL-MCNC: 0.7 MG/DL (ref 0.5–1.4)
DIFFERENTIAL METHOD: ABNORMAL
EOSINOPHIL # BLD AUTO: 0.1 K/UL (ref 0–0.5)
EOSINOPHIL NFR BLD: 2.2 % (ref 0–8)
ERYTHROCYTE [DISTWIDTH] IN BLOOD BY AUTOMATED COUNT: 12.5 % (ref 11.5–14.5)
EST. GFR  (AFRICAN AMERICAN): >60 ML/MIN/1.73 M^2
EST. GFR  (NON AFRICAN AMERICAN): >60 ML/MIN/1.73 M^2
GLUCOSE SERPL-MCNC: 138 MG/DL (ref 70–110)
HCT VFR BLD AUTO: 41.6 % (ref 37–48.5)
HGB BLD-MCNC: 13.7 G/DL (ref 12–16)
IMM GRANULOCYTES # BLD AUTO: 0.02 K/UL (ref 0–0.04)
IMM GRANULOCYTES NFR BLD AUTO: 0.5 % (ref 0–0.5)
LYMPHOCYTES # BLD AUTO: 0.6 K/UL (ref 1–4.8)
LYMPHOCYTES NFR BLD: 15.3 % (ref 18–48)
MCH RBC QN AUTO: 31.9 PG (ref 27–31)
MCHC RBC AUTO-ENTMCNC: 32.9 G/DL (ref 32–36)
MCV RBC AUTO: 97 FL (ref 82–98)
MONOCYTES # BLD AUTO: 0.4 K/UL (ref 0.3–1)
MONOCYTES NFR BLD: 11 % (ref 4–15)
NEUTROPHILS # BLD AUTO: 2.6 K/UL (ref 1.8–7.7)
NEUTROPHILS NFR BLD: 69.9 % (ref 38–73)
NRBC BLD-RTO: 0 /100 WBC
PLATELET # BLD AUTO: 167 K/UL (ref 150–350)
PMV BLD AUTO: 9.4 FL (ref 9.2–12.9)
POTASSIUM SERPL-SCNC: 4.1 MMOL/L (ref 3.5–5.1)
PROT SERPL-MCNC: 6.9 G/DL (ref 6–8.4)
RBC # BLD AUTO: 4.3 M/UL (ref 4–5.4)
SODIUM SERPL-SCNC: 140 MMOL/L (ref 136–145)
WBC # BLD AUTO: 3.65 K/UL (ref 3.9–12.7)

## 2019-12-02 PROCEDURE — 1159F MED LIST DOCD IN RCRD: CPT | Mod: HCNC,S$GLB,, | Performed by: INTERNAL MEDICINE

## 2019-12-02 PROCEDURE — 63600175 PHARM REV CODE 636 W HCPCS: Mod: HCNC | Performed by: INTERNAL MEDICINE

## 2019-12-02 PROCEDURE — 25000003 PHARM REV CODE 250: Mod: HCNC | Performed by: INTERNAL MEDICINE

## 2019-12-02 PROCEDURE — 1101F PR PT FALLS ASSESS DOC 0-1 FALLS W/OUT INJ PAST YR: ICD-10-PCS | Mod: HCNC,CPTII,S$GLB, | Performed by: INTERNAL MEDICINE

## 2019-12-02 PROCEDURE — 1126F AMNT PAIN NOTED NONE PRSNT: CPT | Mod: HCNC,S$GLB,, | Performed by: INTERNAL MEDICINE

## 2019-12-02 PROCEDURE — 1101F PT FALLS ASSESS-DOCD LE1/YR: CPT | Mod: HCNC,CPTII,S$GLB, | Performed by: INTERNAL MEDICINE

## 2019-12-02 PROCEDURE — 99999 PR PBB SHADOW E&M-EST. PATIENT-LVL III: ICD-10-PCS | Mod: PBBFAC,HCNC,, | Performed by: INTERNAL MEDICINE

## 2019-12-02 PROCEDURE — 96523 IRRIG DRUG DELIVERY DEVICE: CPT | Mod: HCNC

## 2019-12-02 PROCEDURE — 80053 COMPREHEN METABOLIC PANEL: CPT | Mod: HCNC

## 2019-12-02 PROCEDURE — 85025 COMPLETE CBC W/AUTO DIFF WBC: CPT | Mod: HCNC

## 2019-12-02 PROCEDURE — 99499 UNLISTED E&M SERVICE: CPT | Mod: HCNC,S$GLB,, | Performed by: INTERNAL MEDICINE

## 2019-12-02 PROCEDURE — 99999 PR PBB SHADOW E&M-EST. PATIENT-LVL III: CPT | Mod: PBBFAC,HCNC,, | Performed by: INTERNAL MEDICINE

## 2019-12-02 PROCEDURE — 1126F PR PAIN SEVERITY QUANTIFIED, NO PAIN PRESENT: ICD-10-PCS | Mod: HCNC,S$GLB,, | Performed by: INTERNAL MEDICINE

## 2019-12-02 PROCEDURE — A4216 STERILE WATER/SALINE, 10 ML: HCPCS | Mod: HCNC | Performed by: INTERNAL MEDICINE

## 2019-12-02 PROCEDURE — 36415 COLL VENOUS BLD VENIPUNCTURE: CPT | Mod: HCNC

## 2019-12-02 PROCEDURE — 99214 OFFICE O/P EST MOD 30 MIN: CPT | Mod: HCNC,S$GLB,, | Performed by: INTERNAL MEDICINE

## 2019-12-02 PROCEDURE — 1159F PR MEDICATION LIST DOCUMENTED IN MEDICAL RECORD: ICD-10-PCS | Mod: HCNC,S$GLB,, | Performed by: INTERNAL MEDICINE

## 2019-12-02 PROCEDURE — 99499 RISK ADDL DX/OHS AUDIT: ICD-10-PCS | Mod: HCNC,S$GLB,, | Performed by: INTERNAL MEDICINE

## 2019-12-02 PROCEDURE — 99214 PR OFFICE/OUTPT VISIT, EST, LEVL IV, 30-39 MIN: ICD-10-PCS | Mod: HCNC,S$GLB,, | Performed by: INTERNAL MEDICINE

## 2019-12-02 RX ORDER — HEPARIN 100 UNIT/ML
500 SYRINGE INTRAVENOUS
Status: DISCONTINUED | OUTPATIENT
Start: 2019-12-02 | End: 2019-12-02 | Stop reason: HOSPADM

## 2019-12-02 RX ORDER — SILVER SULFADIAZINE 10 G/1000G
CREAM TOPICAL
COMMUNITY
Start: 2019-11-22 | End: 2020-01-03 | Stop reason: ALTCHOICE

## 2019-12-02 RX ORDER — SODIUM CHLORIDE 0.9 % (FLUSH) 0.9 %
10 SYRINGE (ML) INJECTION
Status: DISCONTINUED | OUTPATIENT
Start: 2019-12-02 | End: 2019-12-02 | Stop reason: HOSPADM

## 2019-12-02 RX ADMIN — Medication 10 ML: at 08:12

## 2019-12-02 RX ADMIN — HEPARIN 500 UNITS: 100 SYRINGE at 08:12

## 2019-12-02 NOTE — PLAN OF CARE
Patient tolerated monthly portacath flush well. Denies decreased appetite, NVD or constipation. She reports mild fatigue that does interfere with her normal ADLs. Patient received discharge instructions and follow up appointments. Verbalized understanding and ambulated unassisted off unit by self. Patient in NAD at time of discharge.

## 2019-12-02 NOTE — PROGRESS NOTES
Subjective:       Patient ID: Ade Castellano is a 74 y.o. female.    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   Pathology showed a 1.15 cm, grade 3 infiltrating ductal carcinoma.  One sentinel lymph node was negative for malignancy.  Margins were negative and the closest margin was 1 cm.  She was staged a little pT1c little pN0 cM0 stage IA.  She declined adjuvant chemo therapy.  She completed post operative radiation therapy at 400 cGy per fraction to 4000 cGy 9/2014         Pt hospitalized 11/2017   Ms. Castellano was admitted for acute on chronic respiratory failure requiring intubation and ventilation. Has large lung mass in right lung with probable post obstructive pneumonia resulting in COPD exacerbation. But also, patient had ran out of home O2 prior to onset of symptoms, likely contributing to presentation. Initiated on broad spectrum abx, IV steroids, duo-nebs and pulmonology consulted for ventilator co-management. Successfully extubated to BiPAP on 11/30. Then de-escalated to low flow nasal cannula. Abx tailored to augmentin for broad coverage, including anaerobic bacteria /     CTA chest 11/29/2017 revealed   Large right hilar mass with involvement of adjacent segmental pulmonary arterial branches and bronchi with associated postobstructive atelectasis and volume loss in the right middle lobe with adjacent ground glass opacity.  Findings highly concerning for underlying neoplastic process. Mediastinal and axillary adenopathy, with a right axillary lymph node measuring up to 2.0 cm in short axis diameter. No definite evidence of pulmonary thromboembolism.    She is followed by Pulmonology   She underwent  rt axillary LN bx at outside facility- on 1/16/2018 at [facility]  Pathology revealed metastatic poorly differentiated carcinoma of unknown primary site  TTF-1 negative, napsin negative  , cytokeratin 7 positive, cytokeratin 20 negative, p63 negative, cytokeratin 5/6 focal dim positivity    She next underwent lung bx at [facility] l1/31/2018   Pathology revealed benign lung tissue    She underwent Repeat Right Lung Bx 2/14/2018 Pathology reveals Squamous cell carcinoma  PD-L1 10% low expression  EGFR NEG  ALK NEG  BRAF NEG      Outside slides axillary LN specimen  requested for review/comparison to lung bx findings     1) Right Lung Bx 2/14/2018  Supplemental Diagnosis  Immunohistochemical stains show strong nuclear staining for p63 in essentially all tumor cells and very strong  cytoplasmic and membrane staining for CK5/6, also in essentially all tumor cells. TTF-1 and CK7 are negative  within tumor cells but do stain native pulmonary elements present within the biopsy. A stain for mucicarmine is  negative. Positive and negative controls function appropriately.  Final diagnosis: Specimen submitted as right lung biopsy  -Squamous cell carcinoma  (Electronically Signed: 2018-02-20 09:12:07 )  Diagnosed by: Phong Thompson  FINAL PATHOLOGIC DIAGNOSIS  Fragments of pulmonary parenchyma (submitted as right lung biopsy):    FINAL PATHOLOGIC DIAGNOSIS 1. Lymph node, right axilla, biopsy, review of 10 outside slides [facility], JS 18 1 191,  collected January 11, 2018: Metastatic poorly differentiated carcinoma (see comment).  The histologic section shows fibrous stroma infiltrated by nest of poorly differentiated malignant cells including  occasional dyskeratotic cells morphologically suggestive of metastatic squamous cell carcinoma.  Immunohistochemical stains are nonspecific; the cells are positive for cytokeratin 7 and cytokeratin 5/6 (focal) and  negative for cytokeratin 20, TTF-1, p63, GCDFP,  mammoglobin, and CEA      PET/CT  4/19/2018 revealed there has been a excellent response to therapy.  At least 90% reduction in malignant activity.    She s/p  cycle 6 of carboplatin/Abraxane completed 7/2018        PET/CT 2/21/2019 increased size and irregularity of the right axillary lymph node with increased FDG avidity     MAMMO/US rt breast 2/2019 revealed suspicious findings rt axilla LN.  Biopsy of the lymph node measuring 1.4 x 1.1 x 1.3 recommended    Pathology 3/11/2019   FINAL PATHOLOGIC DIAGNOSIS  Right axilla, mass, core biopsy:  Positive for poorly differentiated carcinoma.  he staining profile of the current axillary mass match more closely with the previous  breast carcinoma (due to the p63 negativity in both the 2014 breast cancer and the current axillary mass lesion but  p63 positivity in the lung mass from 2018).  This staining profile, together with the comparison with the patient's prior breast tumor and prior lung tumor supports  a diagnosis of poorly differentiated carcinoma and the malignancy appears morphologically similar to the prior  malignancies from both sites, but the staining profile in this small sample from the axillary mass more closely  correlates with the prior breast malignancy. Pathologic subclassification of this malignancy's primary location is not  definitive and clinical correlation is recommended definitively decide on possible primary location in this patient's  axillary mass sample.  Supplemental (2):  Additional immunohistochemical staining for progesterone receptor and HER2 are completed per clinician request  with following results:  Progesterone receptor: Negative; 0% nuclear tumor cell staining.  HER2: Negative; stain score = 0.  Supplemental (3):    Slides sent to [facility] for outside review  It was determined that agree with  interpretation of this case. In my opinion, the lung tumor corresponds to a squamous cell  carcinoma and appears to be unrelated to the  invasive ductal carcinoma of the breast. The axillary mass, in my  opinion, corresponds to metastases of the breast primary. Although it is a poorly differentiated carcinoma, the  immunophenotype is most consistent with the one that the breast primary exhibited, and is inconsistent with a  metastatic squamous cell carcinoma. I      Further testing sent for cancer type ID also sent and results have revealed molecular diagnosis testing revealed head and neck salivary gland carcinoma probability 84% breast adenocarcinoma could not be excluded with a 12% probability      She is s/p AC q21d x 4 cycles completed 9/6/2019   PET/CT 9/19/2019 shows disease response with interval decrease in size and uptake of several right axillary lymph nodes and a supraclavicular lymph node.  Mild residual activity may indicate viable tumor.  No new hypermetabolic findings worrisome for malignancy.    Pt followed by Rad/Onc  She was presented at [facility] tumor board and it was determined to proceed with radiation only  She reports tentative completion date of RT 12/11/2019     Doing well  No new issues  Appetite and weight stable  She has mild  arthralgias-stable  No  swelling in rt arm    No hemoptysis   No bleeding-urinary/rectal/nasal   No SOB/CP                 PrevHx:She had an abnormal mammogram 6/4/2014 which  Revealed a round solid mass 6mm in rt breast.  She then underwent U/S guided core bx of rt breast mass on 6/17/2014.  Pathology revealed infiltrating ductal carcinoma Grade 3 with tumor  Present in thin-walled spaces suggestive of lymphatic spaces. Hormone  Receptor status on tumor specimen revealed ER negative 0% ,  DE negative 0% and Her 2 jose maria negative.  She subsequently underwent rt segmental mastectomy and SLN bx  On 7/11/2014. Pathology of rt breast lumpectomy revealed invasive  Ductal carcinoma with micropapillary pattern ( Invasive micropapillary  Ca) with max tumor dimension 11.5mm with suggestion of tumor in  "  Thin walled spaces c/w lymphovascular involvement. Surgical  Margins free of tumor, grade 3, ER/RI neg , Her 2 jose maria neg   With Mentcle Lymph node negative for Neoplasm.   Pathologic staging [id number](i-)  Her mother was diagnosed with breast cancer in her 50's/  She has no family history of ovarian cancer.           Review of Systems   Constitutional: Negative for appetite change, fever and unexpected weight change.   HENT: Negative for mouth sores and rhinorrhea.    Eyes: Negative for visual disturbance.   Respiratory: Negative for cough and shortness of breath (mild).    Cardiovascular: Negative for chest pain.   Gastrointestinal: Negative for abdominal pain and diarrhea.   Genitourinary: Negative for frequency.   Musculoskeletal: Positive for arthralgias. Negative for back pain.   Skin: Negative for rash.   Neurological: Negative for dizziness and headaches.   Hematological: Negative for adenopathy.   Psychiatric/Behavioral: The patient is not nervous/anxious.        Objective:        Vitals:    12/02/19 0807   BP: (!) 143/85   BP Location: Left arm   Patient Position: Sitting   BP Method: Medium (Automatic)   Pulse: (!) 57   Temp: 97.5 °F (36.4 °C)   TempSrc: Oral   SpO2: 96%   Weight: 83 kg (182 lb 15.7 oz)   Height: 5' 3" (1.6 m)         Physical Exam   Constitutional: She is oriented to person, place, and time. She appears well-developed and well-nourished.   HENT:   Head: Normocephalic.   Mouth/Throat: Oropharynx is clear and moist. No oropharyngeal exudate.   Eyes: Pupils are equal, round, and reactive to light. Conjunctivae and lids are normal. No scleral icterus.   Neck: Normal range of motion. Neck supple. No thyromegaly present.   Cardiovascular: Normal rate, regular rhythm and normal heart sounds.   No murmur heard.  Pulmonary/Chest: Effort normal and breath sounds normal. She has no wheezes.   Abdominal: Soft. Bowel sounds are normal. There is no tenderness. There is no rebound and no guarding. "   Musculoskeletal: Normal range of motion. She exhibits no edema or tenderness.   Lymphadenopathy:     She has no cervical adenopathy.     She has no axillary adenopathy.        Right: No supraclavicular adenopathy present.        Left: No supraclavicular adenopathy present.   Neurological: She is alert and oriented to person, place, and time. No cranial nerve deficit. Coordination normal.   Skin: Skin is warm and dry. No ecchymosis, no petechiae and no rash noted. No erythema.   Psychiatric: She has a normal mood and affect.             CT a/p w/contrast 11/29/2018   1. No acute abnormality identified within the abdomen and pelvis.    2.  There are a few nonspecific prominent lymph nodes in the upper abdomen including a periesophageal lymph node which measures 1.0 cm in short axis diameter.    3.  Significant abdominal aortic atherosclerosis and abdominal aorta ectasia.    4.  Additional findings as above.    PET/CT 1/26/2018   1.  Intense FDG uptake within the known right infrahilar lung mass, compatible with malignancy.  It is unclear if this represents primary lung malignancy or metastatic breast cancer.    2.  Intensely hypermetabolic right axillary, retropectoral, and lower right cervical lymph nodes, compatible with metastases.    3.  Abnormal FDG uptake along right breast skin thickening, new from prior chest CTA and mammogram.  This may relate to localized edema/inflammation, though correlation with physical exam and mammography are recommended to exclude underlying inflammatory carcinoma.      MRI Brain w w/out contrast 2/9/2018  1.  No evidence of intracranial metastases.  2.  Sinus disease       Rt axillary LN bx at outside facility- on 1/16/2018 at [facility]  Pathology revealed metastatic poorly differentiated carcinoma of unknown primary  site 1/16/2018 at [facility]  TTF-1 negative, napsin negative  , cytokeratin 7 positive, cytokeratin 20 negative, p63 negative,  cytokeratin 5/6 focal dim positivity    LUNG BIOPSY 1/31/2018  Pathology revealed benign lung tissue         SPECIMEN  1) Right Lung Bx 2/14/2018  Supplemental Diagnosis  Immunohistochemical stains show strong nuclear staining for p63 in essentially all tumor cells and very strong  cytoplasmic and membrane staining for CK5/6, also in essentially all tumor cells. TTF-1 and CK7 are negative  within tumor cells but do stain native pulmonary elements present within the biopsy. A stain for mucicarmine is  negative. Positive and negative controls function appropriately.  Final diagnosis: Specimen submitted as right lung biopsy  -Squamous cell carcinoma  (Electronically Signed: 2018-02-20 09:12:07 )  Diagnosed by: Phong Thompson  FINAL PATHOLOGIC DIAGNOSIS  Fragments of pulmonary parenchyma (submitted as right lung biopsy):    FINAL PATHOLOGIC DIAGNOSIS 1. Lymph node, right axilla, biopsy, review of 10 outside slides [facility], JS 18 1 422,  collected January 11, 2018: Metastatic poorly differentiated carcinoma (see comment).  The histologic section shows fibrous stroma infiltrated by nest of poorly differentiated malignant cells including  occasional dyskeratotic cells morphologically suggestive of metastatic squamous cell carcinoma.  Immunohistochemical stains are nonspecific; the cells are positive for cytokeratin 7 and cytokeratin 5/6 (focal) and  negative for cytokeratin 20, TTF-1, p63, GCDFP, mammoglobin, and CEA        PET/CT 2/21/2019  Increased size and irregularity of the right axillary lymph node with increased FDG avidity.  Although this would be atypical in location for lung carcinoma, the increased size and avidity must be concerning for metastatic disease in this patient with history of squamous cell lung cancer.     US Rt breast and mammo 2/26/2019  Impression:  Right  Lymph Node: Right axilla lymph node. Assessment: 4 - Suspicious finding. Biopsy is recommended.      BI-RADS Category:    Right: 4 - Suspicious  Overall: 4 - Suspicious     Recommendation:  Biopsy is recommended. Biopsy of the lymph node measuring 1.4 x 1.1 x 1.3 recommended , the one with the round shape configuration, corresponding to the most recent PET CT finding.         Pathology 3/11/2019   FINAL PATHOLOGIC DIAGNOSIS  Right axilla, mass, core biopsy:  Positive for poorly differentiated carcinoma.  See comment.  Comment:  The biopsy from the right axilla mass shows a poorly differentiated carcinoma in background lymphoid tissue  with enlarged cells showing increased nuclear size, prominent nucleoli, moderate amounts of cytoplasm and mitotic  figures. Immunostains are completed and reveal the tumor cells to stain positively with cytokeratin AE 1/AE3, CK  5/6 and CK 7. The tumor cells are negative for CK 20, Estrogen receptor, p63 and TTF-1. All stains have  satisfactory positive and negative controls. The patient's prior cases will be reviewed and an additional stain for  JUAREZ-3 is pending in an attempt to pinpoint the primary site of this malignancy and results will follow in a  supplemental report.      Supplemental Diagnosis  3/11/2019  The current axillary mass is compared to the patient's prior right lung biopsy (case number [id number]) and the  current axillary mass is morphologically similar to the lung malignancy. Also, the current axillary mass is compared  to the patient's prior breast resection (Case number [id number]) and appears similar as well. P63 is negative in the  [id number] tumor and CK 5/6 shows scattered positive staining in the [id number] tumor.  Immunostain for JUAREZ-3 is completed and shows only rare weak to moderate staining within the tumor cell nuclei  with satisfactory positive and negative controls. This stain is positive in the surrounding lymphocytes. This staining  pattern is non-specific and does not definitively differentiate between a lung and breast primary malignancy in this  right axilla mass  biopsy. The staining profile of the current axillary mass match more closely with the previous  breast carcinoma (due to the p63 negativity in both the 2014 breast cancer and the current axillary mass lesion but  p63 positivity in the lung mass from 2018).  This staining profile, together with the comparison with the patient's prior breast tumor and prior lung tumor supports  a diagnosis of poorly differentiated carcinoma and the malignancy appears morphologically similar to the prior  malignancies from both sites, but the staining profile in this small sample from the axillary mass more closely  correlates with the prior breast malignancy. Pathologic subclassification of this malignancy's primary location is not  definitive and clinical correlation is recommended definitively decide on possible primary location in this patient's  axillary mass sample.  Supplemental (2):  Additional immunohistochemical staining for progesterone receptor and HER2 are completed per clinician request  with following results:  Progesterone receptor: Negative; 0% nuclear tumor cell staining.  HER2: Negative; stain score = 0.  Supplemental (3):  [facility] DIAGNOSIS:  FINAL DIAGNOSIS  Breast, right, needle core biopsy ([id number]; 06/17/2014): Invasive ductal carcinoma, Jaiden grade III (of  III), with focal micropapillary features.  Immunohistochemical stains performed at the referring institution show that the tumor cells have the following  phenotype: - Estrogen receptor: Negative (0% tumor cells staining). - Progesterone receptor: Negative (0% tumor  cells staining). - HER2: Negative (score 0).  Lymph nodes, right, sentinel biopsy and lumpectomy ([id number]; 07/11/2014):  1. Right sentinel lymph node: A single (1) lymph node is negative for metastatic carcinoma.  Immunohistochemical stains performed by the referring institution and reviewed at [facility] for cytokeratin are  negative, confirming the diagnosis.  2. Right breast:  Invasive ductal carcinoma with micropapillary features, per report 11.5 mm in greatest dimension.  Foci suspicious for lymphovascular invasion identified. Margins of resection are free of tumor.  Immunohistochemical stains performed by the referring institution and reviewed at [facility] show that the tumor  cells are negative for p63 and show patchy positivity for CK 5/6.  Lung, right, needle core biopsy ([id number]; 02/14/2018): Squamous cell carcinoma.    Immunohistochemical stains performed by the referring institution show the tumor cells are strongly positive for p63  and CK 5/6, while negative for TTF-1 and CK7. These result support the diagnosis. Axilla, right, needle core biopsy  ([id number]; 03/11/2019): Lymph node positive for metastatic carcinoma, most consistent with breast primary  (see comment).  Immunohistochemical stains performed by the referring institution and reviewed at [facility] show that the tumor  cells are negative for p63, focally positive for CK 5/6, focally and weakly positive for JUAREZ-3, and strongly positive  for CK7. They are also negative for estrogen receptor, progesterone receptor, and HER2 JAM (score 0).  COMMENT:  Thank you for allowing me to review the case of this 73-year-old lady who recently underwent needle core biopsies  of a right axillary mass and who has a previous diagnosis of an invasive ductal carcinoma of the right breast, as well  as a squamous cell carcinoma of the lung. I am reviewing this case because of my special interest in breast  pathology.  I agree with your interpretation of this case. In my opinion, the lung tumor corresponds to a squamous cell  carcinoma and appears to be unrelated to the invasive ductal carcinoma of the breast. The axillary mass, in my  opinion, corresponds to metastases of the breast primary. Although it is a poorly differentiated carcinoma, the  immunophenotype is most consistent with the one that the breast primary exhibited, and  is inconsistent with a  metastatic squamous cell carcinoma. I did share the case with Dr. Anabel Stone, one of our pulmonary pathologists,  and she concurs with this interpretation.  Note: Report attached.  Performing location:  [address] [address]    CT neck/chest/abd/pelvis w/contrast 4/26/2019   The previously described right hilar mass is no longer visible.  There is persistent volume loss in the right middle lobe this appears similar to the prior PET-CT February 21, 2019.    Persistent abnormal right axillary node today measuring about 15 mm compared 18 mm prior.  The positioning of the adjacent nodes is slightly different likely accounting for the slight difference in measurement.    Cluster of tree-in-bud nodular densities left lower lobe series 2, image 93.  This may be related to small airways disease though serial follow-up suggested.      PET/CT 6/20/2019   New and worsening hypermetabolic lymph nodes concerning for metastatic breast cancer as described above.  Tissue sampling would be required for definitive diagnosis.      2-D echo 6/27/2019   · Normal left ventricular systolic function. The estimated ejection fraction is 55%  · Concentric left ventricular remodeling.  · Normal LV diastolic function.  · Normal right ventricular systolic function.  · Moderate left atrial enlargement.  · Mild right atrial enlargement.  · Mild aortic regurgitation.  · Mild mitral regurgitation.  · Mild tricuspid regurgitation.  · Normal central venous pressure (3 mm Hg).  · The estimated PA systolic pressure is 25 mm Hg      PET/CT 9/19/2019   Favorable response to therapy with interval decrease in size and uptake of several right axillary lymph nodes and a supraclavicular lymph node.  Mild residual activity may indicate viable tumor.    No new hypermetabolic findings worrisome for malignancy.      Assessment:       1. Recurrent breast cancer, unspecified  laterality    2. Axillary lymphadenopathy    3.  History of Squamous cell carcinoma of right lung    4. Chronic obstructive pulmonary disease with acute lower respiratory infection        Plan:   ECOG 0  1-4    Pt with hx of Stage 1A invasive ductal carcinoma rt breast s/p rt segmental mastectomy and SLN bx 7/11/2014 ER/MD neg HER 2 jose maria neg [id number](i-).  S/p adjuvant RT completed 9/2014 Pt declined adjuvant chemo  Follow-up Mammo 6/12/2017 No mammographic evidence of malignancy.  Pt  hospitalized 11/2017 with acute-on-chronic  resp failure  Abnormal CT imaging revealing Large right hilar mass with involvement of  adjacent segmental pulmonary arterial branches and bronchi with associated postobstructive atelectasis  and involvement of mediastinal and axillary adenopathy   S/p rt axillary LN bx ( outside facility) - metastatic poorly differentiated carcinoma of unknown primary  site  status post  lung biopsy 1/31/2017 -benign lung tissue  PET/CT 1/26/2018   Intense FDG uptake within the known right infrahilar lung mass, compatible with malignancy.   Intensely hypermetabolic right axillary, retropectoral, and lower right cervical lymph nodes, compatible with metastases.  Abnormal FDG uptake along right breast skin thickening, new from prior chest CTA and mammogram.    Repeat lung bx 2/14/2018 revealed Squamous Cell CA lung  PD-L1 10% low expression  EGFR NEG  ALK NEG    Pt treated for advanced squamous cell CA of lung   She s/p  cycle 6 of carboplatin/Abraxane Day1 completed 7/2/2018 ( day 15 held due to prolonged cytopenias)  She completed therapy with near CR     PET/CT 2/21/2019 showed increased size and irregularity of the right axillary lymph node with increased FDG avidity    Recent MAMMO/US rt breast reveals suspicious findings rt axilla LN.  Biopsy of the lymph node measuring 1.4 x 1.1 x 1.3     Pt s/p  rt axillary LN bx  Pathology 3/11/2019   FINAL PATHOLOGIC DIAGNOSIS  Right axilla, mass, core  biopsy:  Positive for poorly differentiated carcinoma.- likely breast primary   ERneg PRneg Her 2 neg    Outside review of specimen(s) revealed  lung tumor corresponds to a squamous cell carcinoma and appears to be unrelated to the invasive ductal carcinoma of the breast. It was determined The axillary mass, in my opinion, corresponded  to metastases of the breast primary. Although it is a poorly differentiated carcinoma, theimmunophenotype is most consistent with the one that the breast primary exhibited, and is inconsistent with a metastatic squamous cell carcinoma.     CT imaging 4/26/2019 persistent rt axillary LAD, no other sites of disease     Pf followed by Rad/Onc regarding RT   It was determined to discuss potential surgical intervention involving alnd  Plan to discuss with breast surgeon , Dr. Alexandra potential surgical intervention       Lymph node biopsy 3/11/2019 Right axilla, mass, core biopsy:  Positive for poorly differentiated carcinoma.   favor breast primary   Pt was discussed at multidisciplinary tumor board  D/w Pathologist    PET/CT 6/20/2019  New and worsening hypermetabolic lymph nodes concerning for metastatic breast cancer     Previously Discussed  imaging findings in detail with patient which reveals new and worsening hypermetabolic melena metabolic lymph nodes including hypermetabolic supraclavicular node.  Therefore, at treatment option will be systemic chemotherapy in light of the recent imaging findings.  She will be followed by her surgical oncologist Dr. Christopher      IT was determined to proceed with systemic chemotherapy   She underwent systemic chemotherapy with AC  S/p  AC [id number] x 3 wks x 4 wks completed 9/6/2019   ( pt has not received in past)      PET/CT 9/19/2019 shows disease response with interval decrease in size and uptake of several right axillary lymph nodes and a supraclavicular lymph node.  Mild residual activity may indicate viable tumor.  No new hypermetabolic  findings worrisome for malignancy.       Discussed radiographic findings with pt which shows response to therapy  Pt with disease good  response to chemo and no other sites of disease  Pt followed by  Breast Surgery and plan was  for possible   ALND   It was determined to proceed with RT only following discussion at multidisciplinary tumor board    Pt with Recurrent cancer in her right axilla and right supraclavicular fossa.   She completed chemotherapy 9/6/2019  and has had a near complete response.  It was determined  Radiation will be given to the original areas of hypermetabolic activity in the right axilla and right supraclavicular region    Pt was presented at multidisciplinary tumor board  It was determined to proceed with Radiation therapy only. No ALND due to concurrent lung and supraclavicular node    Follow-up with Rad/Onc this week to proceed with  RT      2-D echo reviewed and LVEF 55%   Hb 13.7g/dl-stable  Cont to monitor     Follow-up  1month with cbc,cmp prior to f/u     Advance Care Planning     Power of   I initiated the process of advance care planning today and explained the importance of this process to the patient.  I introduced the concept of advance directives to the patient, as well. Then the patient received detailed information about the importance of designating a Health Care Power of  (HCPOA). She was also instructed to communicate with this person about their wishes for future healthcare, should she become sick and lose decision-making capacity. The patient has previously appointed a HCPOA. After our discussion, the patient has decided to complete a HCPOA and has appointed her son and niece and  I spent a total time of 16 minutes discussing this issue with the patient.         Cc: Swetha Katz M.D.         MD Tory Roberson MD Raymond Gould, MD

## 2019-12-23 ENCOUNTER — PES CALL (OUTPATIENT)
Dept: ADMINISTRATIVE | Facility: CLINIC | Age: 74
End: 2019-12-23

## 2020-01-03 ENCOUNTER — OFFICE VISIT (OUTPATIENT)
Dept: HEMATOLOGY/ONCOLOGY | Facility: CLINIC | Age: 75
End: 2020-01-03
Payer: MEDICARE

## 2020-01-03 ENCOUNTER — LAB VISIT (OUTPATIENT)
Dept: LAB | Facility: HOSPITAL | Age: 75
End: 2020-01-03
Attending: INTERNAL MEDICINE
Payer: MEDICARE

## 2020-01-03 ENCOUNTER — INFUSION (OUTPATIENT)
Dept: INFUSION THERAPY | Facility: HOSPITAL | Age: 75
End: 2020-01-03
Attending: INTERNAL MEDICINE
Payer: MEDICARE

## 2020-01-03 VITALS
HEIGHT: 63 IN | TEMPERATURE: 98 F | DIASTOLIC BLOOD PRESSURE: 70 MMHG | SYSTOLIC BLOOD PRESSURE: 146 MMHG | DIASTOLIC BLOOD PRESSURE: 87 MMHG | OXYGEN SATURATION: 96 % | WEIGHT: 178.38 LBS | SYSTOLIC BLOOD PRESSURE: 140 MMHG | HEART RATE: 54 BPM | RESPIRATION RATE: 17 BRPM | HEART RATE: 56 BPM | TEMPERATURE: 98 F | OXYGEN SATURATION: 96 % | BODY MASS INDEX: 31.61 KG/M2

## 2020-01-03 DIAGNOSIS — J44.9 CHRONIC OBSTRUCTIVE PULMONARY DISEASE, UNSPECIFIED COPD TYPE: Chronic | ICD-10-CM

## 2020-01-03 DIAGNOSIS — C34.91 SQUAMOUS CELL CARCINOMA OF RIGHT LUNG: Primary | ICD-10-CM

## 2020-01-03 DIAGNOSIS — C50.919 RECURRENT BREAST CANCER, UNSPECIFIED LATERALITY: ICD-10-CM

## 2020-01-03 DIAGNOSIS — C34.90 SQUAMOUS CELL CARCINOMA LUNG: Primary | ICD-10-CM

## 2020-01-03 LAB
ALBUMIN SERPL BCP-MCNC: 4.2 G/DL (ref 3.5–5.2)
ALP SERPL-CCNC: 54 U/L (ref 55–135)
ALT SERPL W/O P-5'-P-CCNC: 21 U/L (ref 10–44)
ANION GAP SERPL CALC-SCNC: 9 MMOL/L (ref 8–16)
AST SERPL-CCNC: 18 U/L (ref 10–40)
BASOPHILS # BLD AUTO: 0.03 K/UL (ref 0–0.2)
BASOPHILS NFR BLD: 0.7 % (ref 0–1.9)
BILIRUB SERPL-MCNC: 0.5 MG/DL (ref 0.1–1)
BUN SERPL-MCNC: 13 MG/DL (ref 8–23)
CALCIUM SERPL-MCNC: 9.4 MG/DL (ref 8.7–10.5)
CHLORIDE SERPL-SCNC: 104 MMOL/L (ref 95–110)
CO2 SERPL-SCNC: 28 MMOL/L (ref 23–29)
CREAT SERPL-MCNC: 0.8 MG/DL (ref 0.5–1.4)
DIFFERENTIAL METHOD: ABNORMAL
EOSINOPHIL # BLD AUTO: 0.1 K/UL (ref 0–0.5)
EOSINOPHIL NFR BLD: 1.9 % (ref 0–8)
ERYTHROCYTE [DISTWIDTH] IN BLOOD BY AUTOMATED COUNT: 13.1 % (ref 11.5–14.5)
EST. GFR  (AFRICAN AMERICAN): >60 ML/MIN/1.73 M^2
EST. GFR  (NON AFRICAN AMERICAN): >60 ML/MIN/1.73 M^2
GLUCOSE SERPL-MCNC: 136 MG/DL (ref 70–110)
HCT VFR BLD AUTO: 40.2 % (ref 37–48.5)
HGB BLD-MCNC: 13.1 G/DL (ref 12–16)
IMM GRANULOCYTES # BLD AUTO: 0.03 K/UL (ref 0–0.04)
IMM GRANULOCYTES NFR BLD AUTO: 0.7 % (ref 0–0.5)
LYMPHOCYTES # BLD AUTO: 0.8 K/UL (ref 1–4.8)
LYMPHOCYTES NFR BLD: 18.2 % (ref 18–48)
MCH RBC QN AUTO: 31 PG (ref 27–31)
MCHC RBC AUTO-ENTMCNC: 32.6 G/DL (ref 32–36)
MCV RBC AUTO: 95 FL (ref 82–98)
MONOCYTES # BLD AUTO: 0.5 K/UL (ref 0.3–1)
MONOCYTES NFR BLD: 11 % (ref 4–15)
NEUTROPHILS # BLD AUTO: 2.9 K/UL (ref 1.8–7.7)
NEUTROPHILS NFR BLD: 67.5 % (ref 38–73)
NRBC BLD-RTO: 0 /100 WBC
PLATELET # BLD AUTO: 168 K/UL (ref 150–350)
PMV BLD AUTO: 8.8 FL (ref 9.2–12.9)
POTASSIUM SERPL-SCNC: 4.5 MMOL/L (ref 3.5–5.1)
PROT SERPL-MCNC: 6.9 G/DL (ref 6–8.4)
RBC # BLD AUTO: 4.22 M/UL (ref 4–5.4)
SODIUM SERPL-SCNC: 141 MMOL/L (ref 136–145)
WBC # BLD AUTO: 4.29 K/UL (ref 3.9–12.7)

## 2020-01-03 PROCEDURE — 1126F AMNT PAIN NOTED NONE PRSNT: CPT | Mod: HCNC,S$GLB,, | Performed by: INTERNAL MEDICINE

## 2020-01-03 PROCEDURE — 99214 PR OFFICE/OUTPT VISIT, EST, LEVL IV, 30-39 MIN: ICD-10-PCS | Mod: HCNC,S$GLB,, | Performed by: INTERNAL MEDICINE

## 2020-01-03 PROCEDURE — 1101F PT FALLS ASSESS-DOCD LE1/YR: CPT | Mod: HCNC,CPTII,S$GLB, | Performed by: INTERNAL MEDICINE

## 2020-01-03 PROCEDURE — 99499 RISK ADDL DX/OHS AUDIT: ICD-10-PCS | Mod: HCNC,S$GLB,, | Performed by: INTERNAL MEDICINE

## 2020-01-03 PROCEDURE — A4216 STERILE WATER/SALINE, 10 ML: HCPCS | Mod: HCNC | Performed by: INTERNAL MEDICINE

## 2020-01-03 PROCEDURE — 63600175 PHARM REV CODE 636 W HCPCS: Mod: HCNC | Performed by: INTERNAL MEDICINE

## 2020-01-03 PROCEDURE — 99999 PR PBB SHADOW E&M-EST. PATIENT-LVL III: ICD-10-PCS | Mod: PBBFAC,HCNC,, | Performed by: INTERNAL MEDICINE

## 2020-01-03 PROCEDURE — 1126F PR PAIN SEVERITY QUANTIFIED, NO PAIN PRESENT: ICD-10-PCS | Mod: HCNC,S$GLB,, | Performed by: INTERNAL MEDICINE

## 2020-01-03 PROCEDURE — 3079F DIAST BP 80-89 MM HG: CPT | Mod: HCNC,CPTII,S$GLB, | Performed by: INTERNAL MEDICINE

## 2020-01-03 PROCEDURE — 36415 COLL VENOUS BLD VENIPUNCTURE: CPT | Mod: HCNC

## 2020-01-03 PROCEDURE — 3077F PR MOST RECENT SYSTOLIC BLOOD PRESSURE >= 140 MM HG: ICD-10-PCS | Mod: HCNC,CPTII,S$GLB, | Performed by: INTERNAL MEDICINE

## 2020-01-03 PROCEDURE — 85025 COMPLETE CBC W/AUTO DIFF WBC: CPT | Mod: HCNC

## 2020-01-03 PROCEDURE — 99214 OFFICE O/P EST MOD 30 MIN: CPT | Mod: HCNC,S$GLB,, | Performed by: INTERNAL MEDICINE

## 2020-01-03 PROCEDURE — 99499 UNLISTED E&M SERVICE: CPT | Mod: HCNC,S$GLB,, | Performed by: INTERNAL MEDICINE

## 2020-01-03 PROCEDURE — 1159F PR MEDICATION LIST DOCUMENTED IN MEDICAL RECORD: ICD-10-PCS | Mod: HCNC,S$GLB,, | Performed by: INTERNAL MEDICINE

## 2020-01-03 PROCEDURE — 99999 PR PBB SHADOW E&M-EST. PATIENT-LVL III: CPT | Mod: PBBFAC,HCNC,, | Performed by: INTERNAL MEDICINE

## 2020-01-03 PROCEDURE — 1101F PR PT FALLS ASSESS DOC 0-1 FALLS W/OUT INJ PAST YR: ICD-10-PCS | Mod: HCNC,CPTII,S$GLB, | Performed by: INTERNAL MEDICINE

## 2020-01-03 PROCEDURE — 1159F MED LIST DOCD IN RCRD: CPT | Mod: HCNC,S$GLB,, | Performed by: INTERNAL MEDICINE

## 2020-01-03 PROCEDURE — 80053 COMPREHEN METABOLIC PANEL: CPT | Mod: HCNC

## 2020-01-03 PROCEDURE — 3077F SYST BP >= 140 MM HG: CPT | Mod: HCNC,CPTII,S$GLB, | Performed by: INTERNAL MEDICINE

## 2020-01-03 PROCEDURE — 3079F PR MOST RECENT DIASTOLIC BLOOD PRESSURE 80-89 MM HG: ICD-10-PCS | Mod: HCNC,CPTII,S$GLB, | Performed by: INTERNAL MEDICINE

## 2020-01-03 PROCEDURE — 25000003 PHARM REV CODE 250: Mod: HCNC | Performed by: INTERNAL MEDICINE

## 2020-01-03 RX ORDER — SODIUM CHLORIDE 0.9 % (FLUSH) 0.9 %
10 SYRINGE (ML) INJECTION
Status: DISCONTINUED | OUTPATIENT
Start: 2020-01-03 | End: 2020-01-03 | Stop reason: HOSPADM

## 2020-01-03 RX ORDER — HEPARIN 100 UNIT/ML
500 SYRINGE INTRAVENOUS
Status: DISCONTINUED | OUTPATIENT
Start: 2020-01-03 | End: 2020-01-03 | Stop reason: HOSPADM

## 2020-01-03 RX ORDER — HYDROCODONE BITARTRATE AND ACETAMINOPHEN 7.5; 325 MG/1; MG/1
TABLET ORAL
COMMUNITY
Start: 2019-12-10 | End: 2020-02-03

## 2020-01-03 RX ADMIN — Medication 10 ML: at 08:01

## 2020-01-03 RX ADMIN — HEPARIN 500 UNITS: 100 SYRINGE at 08:01

## 2020-01-03 NOTE — PROGRESS NOTES
Subjective:       Patient ID: Ade Castellano is a 74 y.o. female.    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   Pathology showed a 1.15 cm, grade 3 infiltrating ductal carcinoma.  One sentinel lymph node was negative for malignancy.  Margins were negative and the closest margin was 1 cm.  She was staged a little pT1c little pN0 cM0 stage IA.  She declined adjuvant chemo therapy.  She completed post operative radiation therapy at 400 cGy per fraction to 4000 cGy 9/2014         Pt hospitalized 11/2017   Ms. Castellano was admitted for acute on chronic respiratory failure requiring intubation and ventilation. Has large lung mass in right lung with probable post obstructive pneumonia resulting in COPD exacerbation. But also, patient had ran out of home O2 prior to onset of symptoms, likely contributing to presentation. Initiated on broad spectrum abx, IV steroids, duo-nebs and pulmonology consulted for ventilator co-management. Successfully extubated to BiPAP on 11/30. Then de-escalated to low flow nasal cannula. Abx tailored to augmentin for broad coverage, including anaerobic bacteria /     CTA chest 11/29/2017 revealed   Large right hilar mass with involvement of adjacent segmental pulmonary arterial branches and bronchi with associated postobstructive atelectasis and volume loss in the right middle lobe with adjacent ground glass opacity.  Findings highly concerning for underlying neoplastic process. Mediastinal and axillary adenopathy, with a right axillary lymph node measuring up to 2.0 cm in short axis  diameter. No definite evidence of pulmonary thromboembolism.    She is followed by Pulmonology   She underwent rt axillary LN bx at outside facility- on 1/16/2018 at [facility]  Pathology revealed metastatic poorly differentiated carcinoma of unknown primary site  TTF-1 negative, napsin negative  , cytokeratin 7 positive, cytokeratin 20 negative, p63 negative, cytokeratin 5/6 focal dim positivity    She next underwent lung bx at [facility] 1/31/2018   Pathology revealed benign lung tissue    She underwent Repeat Right Lung Bx 2/14/2018 Pathology reveals Squamous cell carcinoma  PD-L1 10% low expression  EGFR NEG  ALK NEG  BRAF NEG      Outside slides axillary LN specimen  requested for review/comparison to lung bx findings     1) Right Lung Bx 2/14/2018  Supplemental Diagnosis  Immunohistochemical stains show strong nuclear staining for p63 in essentially all tumor cells and very strong  cytoplasmic and membrane staining for CK5/6, also in essentially all tumor cells. TTF-1 and CK7 are negative  within tumor cells but do stain native pulmonary elements present within the biopsy. A stain for mucicarmine is  negative. Positive and negative controls function appropriately.  Final diagnosis: Specimen submitted as right lung biopsy  -Squamous cell carcinoma  (Electronically Signed: 2018-02-20 09:12:07 )  Diagnosed by: Phong Thompson  FINAL PATHOLOGIC DIAGNOSIS  Fragments of pulmonary parenchyma (submitted as right lung biopsy):    FINAL PATHOLOGIC DIAGNOSIS 1. Lymph node, right axilla, biopsy, review of 10 outside slides [facility], JS 18 1 [phone],  collected January 11, 2018: Metastatic poorly differentiated carcinoma (see comment).  The histologic section shows fibrous stroma infiltrated by nest of poorly differentiated malignant cells including  occasional dyskeratotic cells morphologically suggestive of metastatic squamous cell carcinoma.  Immunohistochemical stains are nonspecific; the cells  are positive for cytokeratin 7 and cytokeratin 5/6 (focal) and  negative for cytokeratin 20, TTF-1, p63, GCDFP, mammoglobin, and CEA      PET/CT  4/19/2018 revealed there has been a excellent response to therapy.  At least 90% reduction in malignant activity.    She s/p  cycle 6 of carboplatin/Abraxane completed 7/2018        PET/CT 2/21/2019 increased size and irregularity of the right axillary lymph node with increased FDG avidity     MAMMO/US rt breast 2/2019 revealed suspicious findings rt axilla LN.  Biopsy of the lymph node measuring 1.4 x 1.1 x 1.3 recommended    Pathology 3/11/2019   FINAL PATHOLOGIC DIAGNOSIS  Right axilla, mass, core biopsy:  Positive for poorly differentiated carcinoma.  he staining profile of the current axillary mass match more closely with the previous  breast carcinoma (due to the p63 negativity in both the 2014 breast cancer and the current axillary mass lesion but  p63 positivity in the lung mass from 2018).  This staining profile, together with the comparison with the patient's prior breast tumor and prior lung tumor supports  a diagnosis of poorly differentiated carcinoma and the malignancy appears morphologically similar to the prior  malignancies from both sites, but the staining profile in this small sample from the axillary mass more closely  correlates with the prior breast malignancy. Pathologic subclassification of this malignancy's primary location is not  definitive and clinical correlation is recommended definitively decide on possible primary location in this patient's  axillary mass sample.  Supplemental (2):  Additional immunohistochemical staining for progesterone receptor and HER2 are completed per clinician request  with following results:  Progesterone receptor: Negative; 0% nuclear tumor cell staining.  HER2: Negative; stain score = 0.  Supplemental (3):    Slides sent to [facility] for outside review  It was determined that agree with  interpretation of this  case. In my opinion, the lung tumor corresponds to a squamous cell  carcinoma and appears to be unrelated to the invasive ductal carcinoma of the breast. The axillary mass, in my  opinion, corresponds to metastases of the breast primary. Although it is a poorly differentiated carcinoma, the  immunophenotype is most consistent with the one that the breast primary exhibited, and is inconsistent with a  metastatic squamous cell carcinoma. I      Further testing sent for cancer type ID also sent and results have revealed molecular diagnosis testing revealed head and neck salivary gland carcinoma probability 84% breast adenocarcinoma could not be excluded with a 12% probability      She is s/p AC q21d x 4 cycles completed 9/6/2019   PET/CT 9/19/2019 shows disease response with interval decrease in size and uptake of several right axillary lymph nodes and a supraclavicular lymph node.  Mild residual activity may indicate viable tumor.  No new hypermetabolic findings worrisome for malignancy.    Pt followed by Rad/Onc  She was presented at [facility] tumor board and it was determined to proceed with radiation only  She  completed  RT 12/16/2019   The right axilla and right supraclavicular region was treated at 200 cGy per fraction to 4400 cGy. The PET(+) disease in the right axilla and right supraclavicular fossa will be boosted at 200 cGy per fraction to 6000 cGy total dose.    No new issues  Appetite and weight stable  She reports mild LOGAN since completion of RT   No CP/cough  No hemoptysis   No bleeding-urinary/rectal/nasal          PrevHx:She had an abnormal mammogram 6/4/2014 which  Revealed a round solid mass 6mm in rt breast.  She then underwent U/S guided core bx of rt breast mass on 6/17/2014.  Pathology revealed infiltrating ductal carcinoma Grade 3 with tumor  Present in thin-walled spaces suggestive of lymphatic spaces. Hormone  Receptor status on tumor specimen revealed ER negative 0% ,  WV negative 0%  "and Her 2 jose maria negative.  She subsequently underwent rt segmental mastectomy and SLN bx  On 7/11/2014. Pathology of rt breast lumpectomy revealed invasive  Ductal carcinoma with micropapillary pattern ( Invasive micropapillary  Ca) with max tumor dimension 11.5mm with suggestion of tumor in   Thin walled spaces c/w lymphovascular involvement. Surgical  Margins free of tumor, grade 3, ER/LA neg , Her 2 joes maria neg   With [place] Lymph node negative for Neoplasm.   Pathologic staging [id number](i-)  Her mother was diagnosed with breast cancer in her 50's/  She has no family history of ovarian cancer.           Review of Systems   Constitutional: Negative for appetite change, fever and unexpected weight change.   HENT: Negative for mouth sores and rhinorrhea.    Eyes: Negative for visual disturbance.   Respiratory: Positive for shortness of breath (mild).    Cardiovascular: Negative for chest pain.   Gastrointestinal: Negative for abdominal pain and diarrhea.   Genitourinary: Negative for frequency.   Musculoskeletal: Positive for arthralgias. Negative for back pain.   Skin: Negative for rash.   Neurological: Negative for dizziness and headaches.   Hematological: Negative for adenopathy.   Psychiatric/Behavioral: The patient is not nervous/anxious.        Objective:        Vitals:    01/03/20 0809   BP: (!) 140/87   Pulse: (!) 56   Temp: 97.6 °F (36.4 °C)   TempSrc: Oral   SpO2: 96%   Weight: 80.9 kg (178 lb 5.6 oz)   Height: 5' 3" (1.6 m)         Physical Exam   Constitutional: She is oriented to person, place, and time. She appears well-developed and well-nourished.   HENT:   Head: Normocephalic.   Mouth/Throat: Oropharynx is clear and moist. No oropharyngeal exudate.   Eyes: Pupils are equal, round, and reactive to light. Conjunctivae and lids are normal. No scleral icterus.   Neck: Normal range of motion. Neck supple. No thyromegaly present.   Cardiovascular: Normal rate, regular rhythm and normal heart sounds.   No " murmur heard.  Pulmonary/Chest: Effort normal and breath sounds normal. She has no wheezes.   Abdominal: Soft. Bowel sounds are normal. There is no tenderness. There is no rebound and no guarding.   Musculoskeletal: Normal range of motion. She exhibits no edema or tenderness.   Lymphadenopathy:     She has no cervical adenopathy.     She has no axillary adenopathy.        Right: No supraclavicular adenopathy present.        Left: No supraclavicular adenopathy present.   Neurological: She is alert and oriented to person, place, and time. No cranial nerve deficit. Coordination normal.   Skin: Skin is warm and dry. No ecchymosis, no petechiae and no rash noted. No erythema.   Psychiatric: She has a normal mood and affect.             CT a/p w/contrast 11/29/2018   1. No acute abnormality identified within the abdomen and pelvis.    2.  There are a few nonspecific prominent lymph nodes in the upper abdomen including a periesophageal lymph node which measures 1.0 cm in short axis diameter.    3.  Significant abdominal aortic atherosclerosis and abdominal aorta ectasia.    4.  Additional findings as above.    PET/CT 1/26/2018   1.  Intense FDG uptake within the known right infrahilar lung mass, compatible with malignancy.  It is unclear if this represents primary lung malignancy or metastatic breast cancer.    2.  Intensely hypermetabolic right axillary, retropectoral, and lower right cervical lymph nodes, compatible with metastases.    3.  Abnormal FDG uptake along right breast skin thickening, new from prior chest CTA and mammogram.  This may relate to localized edema/inflammation, though correlation with physical exam and mammography are recommended to exclude underlying inflammatory carcinoma.      MRI Brain w w/out contrast 2/9/2018  1.  No evidence of intracranial metastases.  2.  Sinus disease       Rt axillary LN bx at outside facility- on 1/16/2018 at [facility]  Pathology revealed metastatic poorly  differentiated carcinoma of unknown primary  site 1/16/2018 at [facility]  TTF-1 negative, napsin negative  , cytokeratin 7 positive, cytokeratin 20 negative, p63 negative, cytokeratin 5/6 focal dim positivity    LUNG BIOPSY 1/31/2018  Pathology revealed benign lung tissue         SPECIMEN  1) Right Lung Bx 2/14/2018  Supplemental Diagnosis  Immunohistochemical stains show strong nuclear staining for p63 in essentially all tumor cells and very strong  cytoplasmic and membrane staining for CK5/6, also in essentially all tumor cells. TTF-1 and CK7 are negative  within tumor cells but do stain native pulmonary elements present within the biopsy. A stain for mucicarmine is  negative. Positive and negative controls function appropriately.  Final diagnosis: Specimen submitted as right lung biopsy  -Squamous cell carcinoma  (Electronically Signed: 2018-02-20 09:12:07 )  Diagnosed by: Phong Thompson  FINAL PATHOLOGIC DIAGNOSIS  Fragments of pulmonary parenchyma (submitted as right lung biopsy):    FINAL PATHOLOGIC DIAGNOSIS 1. Lymph node, right axilla, biopsy, review of 10 outside slides [facility], JS 18 1 088,  collected January 11, 2018: Metastatic poorly differentiated carcinoma (see comment).  The histologic section shows fibrous stroma infiltrated by nest of poorly differentiated malignant cells including  occasional dyskeratotic cells morphologically suggestive of metastatic squamous cell carcinoma.  Immunohistochemical stains are nonspecific; the cells are positive for cytokeratin 7 and cytokeratin 5/6 (focal) and  negative for cytokeratin 20, TTF-1, p63, GCDFP, mammoglobin, and CEA        PET/CT 2/21/2019  Increased size and irregularity of the right axillary lymph node with increased FDG avidity.  Although this would be atypical in location for lung carcinoma, the increased size and avidity must be concerning for metastatic disease in this patient with history of squamous cell lung  cancer.     US Rt breast and mammo 2/26/2019  Impression:  Right  Lymph Node: Right axilla lymph node. Assessment: 4 - Suspicious finding. Biopsy is recommended.      BI-RADS Category:   Right: 4 - Suspicious  Overall: 4 - Suspicious     Recommendation:  Biopsy is recommended. Biopsy of the lymph node measuring 1.4 x 1.1 x 1.3 recommended , the one with the round shape configuration, corresponding to the most recent PET CT finding.         Pathology 3/11/2019   FINAL PATHOLOGIC DIAGNOSIS  Right axilla, mass, core biopsy:  Positive for poorly differentiated carcinoma.  See comment.  Comment:  The biopsy from the right axilla mass shows a poorly differentiated carcinoma in background lymphoid tissue  with enlarged cells showing increased nuclear size, prominent nucleoli, moderate amounts of cytoplasm and mitotic  figures. Immunostains are completed and reveal the tumor cells to stain positively with cytokeratin AE 1/AE3, CK  5/6 and CK 7. The tumor cells are negative for CK 20, Estrogen receptor, p63 and TTF-1. All stains have  satisfactory positive and negative controls. The patient's prior cases will be reviewed and an additional stain for  JUAREZ-3 is pending in an attempt to pinpoint the primary site of this malignancy and results will follow in a  supplemental report.      Supplemental Diagnosis  3/11/2019  The current axillary mass is compared to the patient's prior right lung biopsy (case number [id number]) and the  current axillary mass is morphologically similar to the lung malignancy. Also, the current axillary mass is compared  to the patient's prior breast resection (Case number [id number]) and appears similar as well. P63 is negative in the  [id number] tumor and CK 5/6 shows scattered positive staining in the [id number] tumor.  Immunostain for JUAREZ-3 is completed and shows only rare weak to moderate staining within the tumor cell nuclei  with satisfactory positive and negative controls. This stain is positive in  the surrounding lymphocytes. This staining  pattern is non-specific and does not definitively differentiate between a lung and breast primary malignancy in this  right axilla mass biopsy. The staining profile of the current axillary mass match more closely with the previous  breast carcinoma (due to the p63 negativity in both the 2014 breast cancer and the current axillary mass lesion but  p63 positivity in the lung mass from 2018).  This staining profile, together with the comparison with the patient's prior breast tumor and prior lung tumor supports  a diagnosis of poorly differentiated carcinoma and the malignancy appears morphologically similar to the prior  malignancies from both sites, but the staining profile in this small sample from the axillary mass more closely  correlates with the prior breast malignancy. Pathologic subclassification of this malignancy's primary location is not  definitive and clinical correlation is recommended definitively decide on possible primary location in this patient's  axillary mass sample.  Supplemental (2):  Additional immunohistochemical staining for progesterone receptor and HER2 are completed per clinician request  with following results:  Progesterone receptor: Negative; 0% nuclear tumor cell staining.  HER2: Negative; stain score = 0.  Supplemental (3):  [place] DIAGNOSIS:  FINAL DIAGNOSIS  Breast, right, needle core biopsy ([id number]; 06/17/2014): Invasive ductal carcinoma, Jaiden grade III (of  III), with focal micropapillary features.  Immunohistochemical stains performed at the referring institution show that the tumor cells have the following  phenotype: - Estrogen receptor: Negative (0% tumor cells staining). - Progesterone receptor: Negative (0% tumor  cells staining). - HER2: Negative (score 0).  Lymph nodes, right, sentinel biopsy and lumpectomy ([id number]; 07/11/2014):  1. Right sentinel lymph node: A single (1) lymph node is negative for metastatic  carcinoma.  Immunohistochemical stains performed by the referring institution and reviewed at [facility] for cytokeratin are  negative, confirming the diagnosis.  2. Right breast: Invasive ductal carcinoma with micropapillary features, per report 11.5 mm in greatest dimension.  Foci suspicious for lymphovascular invasion identified. Margins of resection are free of tumor.  Immunohistochemical stains performed by the referring institution and reviewed at [facility] show that the tumor  cells are negative for p63 and show patchy positivity for CK 5/6.  Lung, right, needle core biopsy ([id number]; 02/14/2018): Squamous cell carcinoma.    Immunohistochemical stains performed by the referring institution show the tumor cells are strongly positive for p63  and CK 5/6, while negative for TTF-1 and CK7. These result support the diagnosis. Axilla, right, needle core biopsy  ([id number]; 03/11/2019): Lymph node positive for metastatic carcinoma, most consistent with breast primary  (see comment).  Immunohistochemical stains performed by the referring institution and reviewed at [facility] show that the tumor  cells are negative for p63, focally positive for CK 5/6, focally and weakly positive for JUAREZ-3, and strongly positive  for CK7. They are also negative for estrogen receptor, progesterone receptor, and HER2 JAM (score 0).  COMMENT:  Thank you for allowing me to review the case of this 73-year-old lady who recently underwent needle core biopsies  of a right axillary mass and who has a previous diagnosis of an invasive ductal carcinoma of the right breast, as well  as a squamous cell carcinoma of the lung. I am reviewing this case because of my special interest in breast  pathology.  I agree with your interpretation of this case. In my opinion, the lung tumor corresponds to a squamous cell  carcinoma and appears to be unrelated to the invasive ductal carcinoma of the breast. The axillary mass, in my  opinion,  corresponds to metastases of the breast primary. Although it is a poorly differentiated carcinoma, the  immunophenotype is most consistent with the one that the breast primary exhibited, and is inconsistent with a  metastatic squamous cell carcinoma. I did share the case with Dr. Anabel Stone, one of our pulmonary pathologists,  and she concurs with this interpretation.  Note: Report attached.  Performing location:  [facility]  [address] [place] [address]    CT neck/chest/abd/pelvis w/contrast 4/26/2019   The previously described right hilar mass is no longer visible.  There is persistent volume loss in the right middle lobe this appears similar to the prior PET-CT February 21, 2019.    Persistent abnormal right axillary node today measuring about 15 mm compared 18 mm prior.  The positioning of the adjacent nodes is slightly different likely accounting for the slight difference in measurement.    Cluster of tree-in-bud nodular densities left lower lobe series 2, image 93.  This may be related to small airways disease though serial follow-up suggested.      PET/CT 6/20/2019   New and worsening hypermetabolic lymph nodes concerning for metastatic breast cancer as described above.  Tissue sampling would be required for definitive diagnosis.      2-D echo 6/27/2019   · Normal left ventricular systolic function. The estimated ejection fraction is 55%  · Concentric left ventricular remodeling.  · Normal LV diastolic function.  · Normal right ventricular systolic function.  · Moderate left atrial enlargement.  · Mild right atrial enlargement.  · Mild aortic regurgitation.  · Mild mitral regurgitation.  · Mild tricuspid regurgitation.  · Normal central venous pressure (3 mm Hg).  · The estimated PA systolic pressure is 25 mm Hg      PET/CT 9/19/2019   Favorable response to therapy with interval decrease in size and uptake of several right axillary lymph nodes and a  supraclavicular lymph node.  Mild residual activity may indicate viable tumor.    No new hypermetabolic findings worrisome for malignancy.      Assessment:       1.  History of Squamous cell carcinoma of right lung    2. Recurrent breast cancer, unspecified laterality    3. Chronic obstructive pulmonary disease, unspecified COPD type        Plan:   ECOG 0  1-3    Pt with hx of Stage 1A invasive ductal carcinoma rt breast s/p rt segmental mastectomy and SLN bx 7/11/2014 ER/NH neg HER 2 jose maria neg [id number](i-).  S/p adjuvant RT completed 9/2014 Pt declined adjuvant chemo  Follow-up Mammo 6/12/2017 No mammographic evidence of malignancy.  Pt  hospitalized 11/2017 with acute-on-chronic  resp failure  Abnormal CT imaging revealing Large right hilar mass with involvement of  adjacent segmental pulmonary arterial branches and bronchi with associated postobstructive atelectasis  and involvement of mediastinal and axillary adenopathy   S/p rt axillary LN bx ( outside facility) - metastatic poorly differentiated carcinoma of unknown primary  site  status post  lung biopsy 1/31/2017 -benign lung tissue  PET/CT 1/26/2018   Intense FDG uptake within the known right infrahilar lung mass, compatible with malignancy.   Intensely hypermetabolic right axillary, retropectoral, and lower right cervical lymph nodes, compatible with metastases.  Abnormal FDG uptake along right breast skin thickening, new from prior chest CTA and mammogram.    Repeat lung bx 2/14/2018 revealed Squamous Cell CA lung  PD-L1 10% low expression  EGFR NEG  ALK NEG    Pt treated for advanced squamous cell CA of lung   She s/p  cycle 6 of carboplatin/Abraxane Day1 completed 7/2/2018 ( day 15 held due to prolonged cytopenias)  She completed therapy with near CR     PET/CT 2/21/2019 showed increased size and irregularity of the right axillary lymph node with increased FDG avidity    Recent MAMMO/US rt breast reveals suspicious findings rt axilla LN.  Biopsy of the  lymph node measuring 1.4 x 1.1 x 1.3     Pt s/p  rt axillary LN bx  Pathology 3/11/2019   FINAL PATHOLOGIC DIAGNOSIS  Right axilla, mass, core biopsy:  Positive for poorly differentiated carcinoma.- likely breast primary   ERneg PRneg Her 2 neg    Outside review of specimen(s) revealed  lung tumor corresponds to a squamous cell carcinoma and appears to be unrelated to the invasive ductal carcinoma of the breast. It was determined The axillary mass, in my opinion, corresponded  to metastases of the breast primary. Although it is a poorly differentiated carcinoma, theimmunophenotype is most consistent with the one that the breast primary exhibited, and is inconsistent with a metastatic squamous cell carcinoma.     CT imaging 4/26/2019 persistent rt axillary LAD, no other sites of disease     Pf followed by Rad/Onc regarding RT   It was determined to discuss potential surgical intervention involving alnd  Plan to discuss with breast surgeon , Dr. Alexandra potential surgical intervention       Lymph node biopsy 3/11/2019 Right axilla, mass, core biopsy:  Positive for poorly differentiated carcinoma.   favor breast primary   Pt was discussed at multidisciplinary tumor board  D/w Pathologist    PET/CT 6/20/2019  New and worsening hypermetabolic lymph nodes concerning for metastatic breast cancer     Previously Discussed  imaging findings in detail with patient which reveals new and worsening hypermetabolic melena metabolic lymph nodes including hypermetabolic supraclavicular node.  Therefore, at treatment option will be systemic chemotherapy in light of the recent imaging findings.  She will be followed by her surgical oncologist Dr. Christopher      IT was determined to proceed with systemic chemotherapy   She underwent systemic chemotherapy with AC  S/p  AC [id number] x 3 wks x 4 wks completed 9/6/2019   ( pt has not received in past)      PET/CT 9/19/2019 shows disease response with interval decrease in size and uptake of  several right axillary lymph nodes and a supraclavicular lymph node.  Mild residual activity may indicate viable tumor.  No new hypermetabolic findings worrisome for malignancy.       Discussed radiographic findings with pt which shows response to therapy  Pt with disease good  response to chemo and no other sites of disease  Pt followed by  Breast Surgery and plan was  for possible   ALND   It was determined to proceed with RT only following discussion at multidisciplinary tumor board    Pt with Recurrent cancer in her right axilla and right supraclavicular fossa.   She completed chemotherapy 9/6/2019  and has had a near complete response.  It was determined  Radiation will be given to the original areas of hypermetabolic activity in the right axilla and right supraclavicular region    Pt was presented at multidisciplinary tumor board  It was determined to proceed with Radiation therapy only. No ALND due to concurrent lung and supraclavicular node    Pt completed  RT 12/16/2019   Follow-up with Rad/Onc     2-D echo reviewed and LVEF 55%   Hb 13.g/dl-stable    Pt doing well   Cont to monitor     Follow-up  1month with cbc,cmp prior to f/u     Advance Care Planning     Power of   I initiated the process of advance care planning today and explained the importance of this process to the patient.  I introduced the concept of advance directives to the patient, as well. Then the patient received detailed information about the importance of designating a Health Care Power of  (HCPOA). She was also instructed to communicate with this person about their wishes for future healthcare, should she become sick and lose decision-making capacity. The patient has previously appointed a HCPOA. After our discussion, the patient has decided to complete a HCPOA and has appointed her son and niece and  I spent a total time of 16 minutes discussing this issue with the patient.         Cc: LUNA Gomez  MD Tory Huitron MD Raymond Gould, MD

## 2020-01-03 NOTE — PLAN OF CARE
Pt arrived to unit from Dr. Holliday's office. Tolerated port flush. No complaints voiced. AVS given to pt. Pt ambulated off unit, no distress noted.

## 2020-01-14 RX ORDER — FLUTICASONE PROPIONATE 50 MCG
SPRAY, SUSPENSION (ML) NASAL
Qty: 16 G | Refills: 5 | Status: SHIPPED | OUTPATIENT
Start: 2020-01-14 | End: 2021-03-17

## 2020-01-23 ENCOUNTER — OFFICE VISIT (OUTPATIENT)
Dept: FAMILY MEDICINE | Facility: CLINIC | Age: 75
End: 2020-01-23
Payer: MEDICARE

## 2020-01-23 VITALS
BODY MASS INDEX: 32.61 KG/M2 | OXYGEN SATURATION: 96 % | SYSTOLIC BLOOD PRESSURE: 124 MMHG | HEIGHT: 63 IN | HEART RATE: 62 BPM | DIASTOLIC BLOOD PRESSURE: 80 MMHG | WEIGHT: 184.06 LBS | TEMPERATURE: 98 F | RESPIRATION RATE: 20 BRPM

## 2020-01-23 DIAGNOSIS — I25.2 OLD MYOCARDIAL INFARCTION: ICD-10-CM

## 2020-01-23 DIAGNOSIS — I70.0 ATHEROSCLEROSIS OF AORTA: Chronic | ICD-10-CM

## 2020-01-23 DIAGNOSIS — Z85.118 HISTORY OF LUNG CANCER: ICD-10-CM

## 2020-01-23 DIAGNOSIS — Z66 DNR (DO NOT RESUSCITATE): Chronic | ICD-10-CM

## 2020-01-23 DIAGNOSIS — Z85.3 HISTORY OF BREAST CANCER: Chronic | ICD-10-CM

## 2020-01-23 DIAGNOSIS — R73.03 PREDIABETES: ICD-10-CM

## 2020-01-23 DIAGNOSIS — Z99.81 DEPENDENCE ON SUPPLEMENTAL OXYGEN: ICD-10-CM

## 2020-01-23 DIAGNOSIS — I25.10 CORONARY ARTERY DISEASE INVOLVING NATIVE CORONARY ARTERY OF NATIVE HEART WITHOUT ANGINA PECTORIS: Chronic | ICD-10-CM

## 2020-01-23 DIAGNOSIS — E78.5 HYPERLIPIDEMIA LDL GOAL <70: Chronic | ICD-10-CM

## 2020-01-23 DIAGNOSIS — I20.89 STABLE ANGINA: Chronic | ICD-10-CM

## 2020-01-23 DIAGNOSIS — I11.9 BENIGN HYPERTENSIVE HEART DISEASE WITHOUT HEART FAILURE: Chronic | ICD-10-CM

## 2020-01-23 DIAGNOSIS — J44.9 CHRONIC OBSTRUCTIVE PULMONARY DISEASE, UNSPECIFIED COPD TYPE: Chronic | ICD-10-CM

## 2020-01-23 DIAGNOSIS — Z00.00 ENCOUNTER FOR PREVENTIVE HEALTH EXAMINATION: Primary | ICD-10-CM

## 2020-01-23 PROBLEM — J44.0 CHRONIC OBSTRUCTIVE PULMONARY DISEASE WITH ACUTE LOWER RESPIRATORY INFECTION: Status: RESOLVED | Noted: 2019-07-20 | Resolved: 2020-01-23

## 2020-01-23 PROBLEM — Z01.810 PREOP CARDIOVASCULAR EXAM: Status: RESOLVED | Noted: 2019-10-10 | Resolved: 2020-01-23

## 2020-01-23 PROBLEM — Z86.010 PERSONAL HISTORY OF COLONIC POLYPS: Status: RESOLVED | Noted: 2017-06-30 | Resolved: 2020-01-23

## 2020-01-23 PROBLEM — Z86.0100 PERSONAL HISTORY OF COLONIC POLYPS: Status: RESOLVED | Noted: 2017-06-30 | Resolved: 2020-01-23

## 2020-01-23 PROBLEM — I10 HYPERTENSION: Status: RESOLVED | Noted: 2019-10-10 | Resolved: 2020-01-23

## 2020-01-23 PROCEDURE — 3074F PR MOST RECENT SYSTOLIC BLOOD PRESSURE < 130 MM HG: ICD-10-PCS | Mod: HCNC,CPTII,S$GLB, | Performed by: PHYSICIAN ASSISTANT

## 2020-01-23 PROCEDURE — 99499 RISK ADDL DX/OHS AUDIT: ICD-10-PCS | Mod: HCNC,S$GLB,, | Performed by: PHYSICIAN ASSISTANT

## 2020-01-23 PROCEDURE — 3079F PR MOST RECENT DIASTOLIC BLOOD PRESSURE 80-89 MM HG: ICD-10-PCS | Mod: HCNC,CPTII,S$GLB, | Performed by: PHYSICIAN ASSISTANT

## 2020-01-23 PROCEDURE — G0439 PPPS, SUBSEQ VISIT: HCPCS | Mod: HCNC,S$GLB,, | Performed by: PHYSICIAN ASSISTANT

## 2020-01-23 PROCEDURE — 99999 PR PBB SHADOW E&M-EST. PATIENT-LVL V: CPT | Mod: PBBFAC,HCNC,, | Performed by: PHYSICIAN ASSISTANT

## 2020-01-23 PROCEDURE — G0439 PR MEDICARE ANNUAL WELLNESS SUBSEQUENT VISIT: ICD-10-PCS | Mod: HCNC,S$GLB,, | Performed by: PHYSICIAN ASSISTANT

## 2020-01-23 PROCEDURE — 3079F DIAST BP 80-89 MM HG: CPT | Mod: HCNC,CPTII,S$GLB, | Performed by: PHYSICIAN ASSISTANT

## 2020-01-23 PROCEDURE — 3074F SYST BP LT 130 MM HG: CPT | Mod: HCNC,CPTII,S$GLB, | Performed by: PHYSICIAN ASSISTANT

## 2020-01-23 PROCEDURE — 99499 UNLISTED E&M SERVICE: CPT | Mod: HCNC,S$GLB,, | Performed by: PHYSICIAN ASSISTANT

## 2020-01-23 PROCEDURE — 99999 PR PBB SHADOW E&M-EST. PATIENT-LVL V: ICD-10-PCS | Mod: PBBFAC,HCNC,, | Performed by: PHYSICIAN ASSISTANT

## 2020-01-23 RX ORDER — ISOSORBIDE MONONITRATE 30 MG/1
30 TABLET, EXTENDED RELEASE ORAL DAILY
Qty: 30 TABLET | Refills: 11 | Status: SHIPPED | OUTPATIENT
Start: 2020-01-23 | End: 2021-02-02

## 2020-01-23 RX ORDER — ROSUVASTATIN CALCIUM 10 MG/1
10 TABLET, COATED ORAL DAILY
Qty: 30 TABLET | Refills: 11 | Status: SHIPPED | OUTPATIENT
Start: 2020-01-23 | End: 2021-02-02

## 2020-01-23 NOTE — PATIENT INSTRUCTIONS
Counseling and Referral of Other Preventative  (Italic type indicates deductible and co-insurance are waived)    Patient Name: Ade Castellano  Today's Date: 1/23/2020    Health Maintenance       Date Due Completion Date    TETANUS VACCINE 10/08/1964 ---    High Dose Statin 10/08/1967 ---    Shingles Vaccine (1 of 2) 10/08/1996 ---    Pneumococcal Vaccine (65+ High/Highest Risk) (2 of 2 - PPSV23) 09/05/2017 7/11/2017    DEXA SCAN 03/08/2020 3/8/2017    Mammogram 03/11/2021 3/11/2019    Override on 6/12/2017: Done    Colonoscopy 01/29/2022 1/29/2019    Lipid Panel 03/04/2024 3/4/2019        No orders of the defined types were placed in this encounter.    The following information is provided to all patients.  This information is to help you find resources for any of the problems found today that may be affecting your health:                Living healthy guide: www.[facility].[place].[facility]      Understanding Diabetes: www.diabetes.org      Eating healthy: www.cdc.gov/healthyweight      CDC home safety checklist: www.cdc.gov/steadi/patient.html      Agency on Aging: www.goea.[place].gov      Alcoholics anonymous (AA): www.aa.org      Physical Activity: www.anca.nih.gov/xd2vhnl      Tobacco use: www.quitwithusla.org

## 2020-01-23 NOTE — PROGRESS NOTES
"Ade Castellano presented for a  Medicare AWV and comprehensive Health Risk Assessment today. The following components were reviewed and updated:    · Medical history  · Family History  · Social history  · Allergies and Current Medications  · Health Risk Assessment  · Health Maintenance  · Care Team     ** See Completed Assessments for Annual Wellness Visit within the encounter summary.**       The following assessments were completed:  · Living Situation  · CAGE  · Depression Screening  · Timed Get Up and Go  · Whisper Test  · Cognitive Function Screening  · Nutrition Screening  · ADL Screening  · PAQ Screening      Vitals:    01/23/20 0801   BP: 124/80   BP Location: Left arm   Patient Position: Sitting   BP Method: Large (Manual)   Pulse: 62   Resp: 20   Temp: 97.6 °F (36.4 °C)   TempSrc: Oral   SpO2: 96%   Weight: 83.5 kg (184 lb 1.4 oz)   Height: 5' 3" (1.6 m)     Body mass index is 32.61 kg/m².  Physical Exam      Diagnoses and health risks identified today and associated recommendations/orders:    1. Atherosclerosis of aorta  Continue to control RF BP and lipids   - Lipid panel; Future    2. Prediabetes  Continue to monitor   - Hemoglobin A1c; Future    3. Coronary artery disease involving native coronary artery of native heart without angina pectoris  The current medical regimen is effective;  continue present plan and medications.  - Lipid panel; Future    4. Chronic obstructive pulmonary disease, unspecified COPD type  The current medical regimen is effective;  continue present plan and medications.    5. Hyperlipidemia LDL goal <70  The current medical regimen is effective;  continue present plan and medications.  =  - rosuvastatin (CRESTOR) 10 MG tablet; Take 1 tablet (10 mg total) by mouth once daily.  Dispense: 30 tablet; Refill: 11  - Lipid panel; Future    6. Old myocardial infarction  No current issues  The current medical regimen is effective;  continue present plan and medications.    7. Stable " angina  The current medical regimen is effective;  continue present plan and medications.  - isosorbide mononitrate (IMDUR) 30 MG 24 hr tablet; Take 1 tablet (30 mg total) by mouth once daily.  Dispense: 30 tablet; Refill: 11    8. Benign hypertensive heart disease without heart failure  The current medical regimen is effective;  continue present plan and medications.    9. History of lung cancer  Continue to follow up with specialist      10. History of breast cancer  Continue to follow up with specialist    11. DNR (do not resuscitate)      12. Dependence on supplemental oxygen  Continue to follow up with specialist    13. Encounter for preventive health examination  Stable doing well       Provided Ade with a 5-10 year written screening schedule and personal prevention plan. Recommendations were developed using the USPSTF age appropriate recommendations. Education, counseling, and referrals were provided as needed. After Visit Summary printed and given to patient which includes a list of additional screenings\tests needed.    No follow-ups on file.    OBDULIO Jay

## 2020-02-03 ENCOUNTER — OFFICE VISIT (OUTPATIENT)
Dept: HEMATOLOGY/ONCOLOGY | Facility: CLINIC | Age: 75
End: 2020-02-03
Payer: MEDICARE

## 2020-02-03 ENCOUNTER — LAB VISIT (OUTPATIENT)
Dept: LAB | Facility: HOSPITAL | Age: 75
End: 2020-02-03
Attending: INTERNAL MEDICINE
Payer: MEDICARE

## 2020-02-03 ENCOUNTER — TELEPHONE (OUTPATIENT)
Dept: FAMILY MEDICINE | Facility: CLINIC | Age: 75
End: 2020-02-03

## 2020-02-03 ENCOUNTER — INFUSION (OUTPATIENT)
Dept: INFUSION THERAPY | Facility: HOSPITAL | Age: 75
End: 2020-02-03
Attending: INTERNAL MEDICINE
Payer: MEDICARE

## 2020-02-03 VITALS
TEMPERATURE: 98 F | SYSTOLIC BLOOD PRESSURE: 148 MMHG | WEIGHT: 181.19 LBS | DIASTOLIC BLOOD PRESSURE: 87 MMHG | HEART RATE: 57 BPM | BODY MASS INDEX: 32.11 KG/M2 | HEIGHT: 63 IN | OXYGEN SATURATION: 95 %

## 2020-02-03 DIAGNOSIS — J44.9 CHRONIC OBSTRUCTIVE PULMONARY DISEASE, UNSPECIFIED COPD TYPE: ICD-10-CM

## 2020-02-03 DIAGNOSIS — C34.91 SQUAMOUS CELL CARCINOMA OF RIGHT LUNG: ICD-10-CM

## 2020-02-03 DIAGNOSIS — C34.90 SQUAMOUS CELL CARCINOMA LUNG: Primary | ICD-10-CM

## 2020-02-03 DIAGNOSIS — C50.811 MALIGNANT NEOPLASM OF OVERLAPPING SITES OF RIGHT FEMALE BREAST, UNSPECIFIED ESTROGEN RECEPTOR STATUS: ICD-10-CM

## 2020-02-03 DIAGNOSIS — C50.919 RECURRENT BREAST CANCER, UNSPECIFIED LATERALITY: Primary | ICD-10-CM

## 2020-02-03 DIAGNOSIS — R73.03 PREDIABETES: ICD-10-CM

## 2020-02-03 DIAGNOSIS — I25.10 CORONARY ARTERY DISEASE INVOLVING NATIVE CORONARY ARTERY OF NATIVE HEART WITHOUT ANGINA PECTORIS: Chronic | ICD-10-CM

## 2020-02-03 DIAGNOSIS — I70.0 ATHEROSCLEROSIS OF AORTA: Chronic | ICD-10-CM

## 2020-02-03 DIAGNOSIS — C50.919 RECURRENT BREAST CANCER, UNSPECIFIED LATERALITY: ICD-10-CM

## 2020-02-03 DIAGNOSIS — E78.5 HYPERLIPIDEMIA LDL GOAL <70: Chronic | ICD-10-CM

## 2020-02-03 LAB
ALBUMIN SERPL BCP-MCNC: 4.2 G/DL (ref 3.5–5.2)
ALP SERPL-CCNC: 47 U/L (ref 55–135)
ALT SERPL W/O P-5'-P-CCNC: 20 U/L (ref 10–44)
ANION GAP SERPL CALC-SCNC: 10 MMOL/L (ref 8–16)
AST SERPL-CCNC: 19 U/L (ref 10–40)
BASOPHILS # BLD AUTO: 0.04 K/UL (ref 0–0.2)
BASOPHILS NFR BLD: 1 % (ref 0–1.9)
BILIRUB SERPL-MCNC: 0.5 MG/DL (ref 0.1–1)
BUN SERPL-MCNC: 18 MG/DL (ref 8–23)
CALCIUM SERPL-MCNC: 9.7 MG/DL (ref 8.7–10.5)
CHLORIDE SERPL-SCNC: 107 MMOL/L (ref 95–110)
CHOLEST SERPL-MCNC: 125 MG/DL (ref 120–199)
CHOLEST/HDLC SERPL: 2.6 {RATIO} (ref 2–5)
CO2 SERPL-SCNC: 23 MMOL/L (ref 23–29)
CREAT SERPL-MCNC: 0.8 MG/DL (ref 0.5–1.4)
DIFFERENTIAL METHOD: ABNORMAL
EOSINOPHIL # BLD AUTO: 0.1 K/UL (ref 0–0.5)
EOSINOPHIL NFR BLD: 1.8 % (ref 0–8)
ERYTHROCYTE [DISTWIDTH] IN BLOOD BY AUTOMATED COUNT: 13.8 % (ref 11.5–14.5)
EST. GFR  (AFRICAN AMERICAN): >60 ML/MIN/1.73 M^2
EST. GFR  (NON AFRICAN AMERICAN): >60 ML/MIN/1.73 M^2
ESTIMATED AVG GLUCOSE: 128 MG/DL (ref 68–131)
GLUCOSE SERPL-MCNC: 112 MG/DL (ref 70–110)
HBA1C MFR BLD HPLC: 6.1 % (ref 4–5.6)
HCT VFR BLD AUTO: 40 % (ref 37–48.5)
HDLC SERPL-MCNC: 48 MG/DL (ref 40–75)
HDLC SERPL: 38.4 % (ref 20–50)
HGB BLD-MCNC: 13.2 G/DL (ref 12–16)
IMM GRANULOCYTES # BLD AUTO: 0.02 K/UL (ref 0–0.04)
IMM GRANULOCYTES NFR BLD AUTO: 0.5 % (ref 0–0.5)
LDLC SERPL CALC-MCNC: 53.6 MG/DL (ref 63–159)
LYMPHOCYTES # BLD AUTO: 0.7 K/UL (ref 1–4.8)
LYMPHOCYTES NFR BLD: 18.3 % (ref 18–48)
MCH RBC QN AUTO: 31.6 PG (ref 27–31)
MCHC RBC AUTO-ENTMCNC: 33 G/DL (ref 32–36)
MCV RBC AUTO: 96 FL (ref 82–98)
MONOCYTES # BLD AUTO: 0.4 K/UL (ref 0.3–1)
MONOCYTES NFR BLD: 10.6 % (ref 4–15)
NEUTROPHILS # BLD AUTO: 2.7 K/UL (ref 1.8–7.7)
NEUTROPHILS NFR BLD: 67.8 % (ref 38–73)
NONHDLC SERPL-MCNC: 77 MG/DL
NRBC BLD-RTO: 0 /100 WBC
PLATELET # BLD AUTO: 173 K/UL (ref 150–350)
PMV BLD AUTO: 9.1 FL (ref 9.2–12.9)
POTASSIUM SERPL-SCNC: 4.4 MMOL/L (ref 3.5–5.1)
PROT SERPL-MCNC: 6.7 G/DL (ref 6–8.4)
RBC # BLD AUTO: 4.18 M/UL (ref 4–5.4)
SODIUM SERPL-SCNC: 140 MMOL/L (ref 136–145)
TRIGL SERPL-MCNC: 117 MG/DL (ref 30–150)
WBC # BLD AUTO: 3.98 K/UL (ref 3.9–12.7)

## 2020-02-03 PROCEDURE — 96523 IRRIG DRUG DELIVERY DEVICE: CPT | Mod: HCNC

## 2020-02-03 PROCEDURE — 99499 UNLISTED E&M SERVICE: CPT | Mod: HCNC,S$GLB,, | Performed by: INTERNAL MEDICINE

## 2020-02-03 PROCEDURE — 63600175 PHARM REV CODE 636 W HCPCS: Mod: HCNC | Performed by: INTERNAL MEDICINE

## 2020-02-03 PROCEDURE — 99999 PR PBB SHADOW E&M-EST. PATIENT-LVL III: CPT | Mod: PBBFAC,HCNC,, | Performed by: INTERNAL MEDICINE

## 2020-02-03 PROCEDURE — 85025 COMPLETE CBC W/AUTO DIFF WBC: CPT | Mod: HCNC

## 2020-02-03 PROCEDURE — 99499 RISK ADDL DX/OHS AUDIT: ICD-10-PCS | Mod: HCNC,S$GLB,, | Performed by: INTERNAL MEDICINE

## 2020-02-03 PROCEDURE — 1101F PR PT FALLS ASSESS DOC 0-1 FALLS W/OUT INJ PAST YR: ICD-10-PCS | Mod: HCNC,CPTII,S$GLB, | Performed by: INTERNAL MEDICINE

## 2020-02-03 PROCEDURE — 3077F SYST BP >= 140 MM HG: CPT | Mod: HCNC,CPTII,S$GLB, | Performed by: INTERNAL MEDICINE

## 2020-02-03 PROCEDURE — 1126F AMNT PAIN NOTED NONE PRSNT: CPT | Mod: HCNC,S$GLB,, | Performed by: INTERNAL MEDICINE

## 2020-02-03 PROCEDURE — 99999 PR PBB SHADOW E&M-EST. PATIENT-LVL III: ICD-10-PCS | Mod: PBBFAC,HCNC,, | Performed by: INTERNAL MEDICINE

## 2020-02-03 PROCEDURE — 1126F PR PAIN SEVERITY QUANTIFIED, NO PAIN PRESENT: ICD-10-PCS | Mod: HCNC,S$GLB,, | Performed by: INTERNAL MEDICINE

## 2020-02-03 PROCEDURE — 80053 COMPREHEN METABOLIC PANEL: CPT | Mod: HCNC

## 2020-02-03 PROCEDURE — 3079F DIAST BP 80-89 MM HG: CPT | Mod: HCNC,CPTII,S$GLB, | Performed by: INTERNAL MEDICINE

## 2020-02-03 PROCEDURE — 25000003 PHARM REV CODE 250: Mod: HCNC | Performed by: INTERNAL MEDICINE

## 2020-02-03 PROCEDURE — A4216 STERILE WATER/SALINE, 10 ML: HCPCS | Mod: HCNC | Performed by: INTERNAL MEDICINE

## 2020-02-03 PROCEDURE — 3079F PR MOST RECENT DIASTOLIC BLOOD PRESSURE 80-89 MM HG: ICD-10-PCS | Mod: HCNC,CPTII,S$GLB, | Performed by: INTERNAL MEDICINE

## 2020-02-03 PROCEDURE — 80061 LIPID PANEL: CPT | Mod: HCNC

## 2020-02-03 PROCEDURE — 1159F PR MEDICATION LIST DOCUMENTED IN MEDICAL RECORD: ICD-10-PCS | Mod: HCNC,S$GLB,, | Performed by: INTERNAL MEDICINE

## 2020-02-03 PROCEDURE — 1101F PT FALLS ASSESS-DOCD LE1/YR: CPT | Mod: HCNC,CPTII,S$GLB, | Performed by: INTERNAL MEDICINE

## 2020-02-03 PROCEDURE — 83036 HEMOGLOBIN GLYCOSYLATED A1C: CPT | Mod: HCNC

## 2020-02-03 PROCEDURE — 1159F MED LIST DOCD IN RCRD: CPT | Mod: HCNC,S$GLB,, | Performed by: INTERNAL MEDICINE

## 2020-02-03 PROCEDURE — 3077F PR MOST RECENT SYSTOLIC BLOOD PRESSURE >= 140 MM HG: ICD-10-PCS | Mod: HCNC,CPTII,S$GLB, | Performed by: INTERNAL MEDICINE

## 2020-02-03 PROCEDURE — 36415 COLL VENOUS BLD VENIPUNCTURE: CPT | Mod: HCNC

## 2020-02-03 PROCEDURE — 99214 PR OFFICE/OUTPT VISIT, EST, LEVL IV, 30-39 MIN: ICD-10-PCS | Mod: HCNC,S$GLB,, | Performed by: INTERNAL MEDICINE

## 2020-02-03 PROCEDURE — 99214 OFFICE O/P EST MOD 30 MIN: CPT | Mod: HCNC,S$GLB,, | Performed by: INTERNAL MEDICINE

## 2020-02-03 RX ORDER — SODIUM CHLORIDE 0.9 % (FLUSH) 0.9 %
10 SYRINGE (ML) INJECTION
Status: DISCONTINUED | OUTPATIENT
Start: 2020-02-03 | End: 2020-02-03 | Stop reason: HOSPADM

## 2020-02-03 RX ORDER — HEPARIN 100 UNIT/ML
500 SYRINGE INTRAVENOUS
Status: DISCONTINUED | OUTPATIENT
Start: 2020-02-03 | End: 2020-02-03 | Stop reason: HOSPADM

## 2020-02-03 RX ADMIN — HEPARIN 500 UNITS: 100 SYRINGE at 08:02

## 2020-02-03 RX ADMIN — Medication 10 ML: at 08:02

## 2020-02-03 NOTE — TELEPHONE ENCOUNTER
----- Message from Raya Carias sent at 2/3/2020 10:35 AM CST -----  Contact: CHOLO HARRIS [[id number]]  Name of Who is Calling:CHOLO HARRIS [[id number]]    What is the request in detail: patient would like to schedule appt to have pneumonia shot. Please call      Can the clinic reply by [facility]:no    What Number to Call Back if not in [facility]: [phone]

## 2020-02-03 NOTE — PROGRESS NOTES
Subjective:       Patient ID: Ade Castellano is a 74 y.o. female.    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   Pathology showed a 1.15 cm, grade 3 infiltrating ductal carcinoma.  One sentinel lymph node was negative for malignancy.  Margins were negative and the closest margin was 1 cm.  She was staged a little pT1c little pN0 cM0 stage IA.  She declined adjuvant chemo therapy.  She completed post operative radiation therapy at 400 cGy per fraction to 4000 cGy 9/2014         Pt hospitalized 11/2017   Ms. Castellano was admitted for acute on chronic respiratory failure requiring intubation and ventilation. Has large lung mass in right lung with probable post obstructive pneumonia resulting in COPD exacerbation. But also, patient had ran out of home O2 prior to onset of symptoms, likely contributing to presentation. Initiated on broad spectrum abx, IV steroids, duo-nebs and pulmonology consulted for ventilator co-management. Successfully extubated to BiPAP on 11/30. Then de-escalated to low flow nasal cannula. Abx tailored to augmentin for broad coverage, including anaerobic bacteria /     CTA chest 11/29/2017 revealed   Large right hilar mass with involvement of adjacent segmental pulmonary arterial branches and bronchi with associated postobstructive atelectasis and volume loss in the right middle lobe with adjacent ground glass opacity.  Findings highly concerning for underlying neoplastic process. Mediastinal and axillary adenopathy, with a right axillary lymph node measuring up to 2.0 cm in short axis  diameter. No definite evidence of pulmonary thromboembolism.    She is followed by Pulmonology   She underwent rt axillary LN bx at outside facility- on 1/16/2018 at [facility]  Pathology revealed metastatic poorly differentiated carcinoma of unknown primary site  TTF-1 negative, napsin negative  , cytokeratin 7 positive, cytokeratin 20 negative, p63 negative, cytokeratin 5/6 focal dim positivity    She next underwent lung bx at [facility] 1/31/2018   Pathology revealed benign lung tissue    She underwent Repeat Right Lung Bx 2/14/2018 Pathology reveals Squamous cell carcinoma  PD-L1 10% low expression  EGFR NEG  ALK NEG  BRAF NEG      Outside slides axillary LN specimen  requested for review/comparison to lung bx findings     1) Right Lung Bx 2/14/2018  Supplemental Diagnosis  Immunohistochemical stains show strong nuclear staining for p63 in essentially all tumor cells and very strong  cytoplasmic and membrane staining for CK5/6, also in essentially all tumor cells. TTF-1 and CK7 are negative  within tumor cells but do stain native pulmonary elements present within the biopsy. A stain for mucicarmine is  negative. Positive and negative controls function appropriately.  Final diagnosis: Specimen submitted as right lung biopsy  -Squamous cell carcinoma  (Electronically Signed: 2018-02-20 09:12:07 )  Diagnosed by: Phong Thompson  FINAL PATHOLOGIC DIAGNOSIS  Fragments of pulmonary parenchyma (submitted as right lung biopsy):    FINAL PATHOLOGIC DIAGNOSIS 1. Lymph node, right axilla, biopsy, review of 10 outside slides [facility], JS 18 [phone],  collected January 11, 2018: Metastatic poorly differentiated carcinoma (see comment).  The histologic section shows fibrous stroma infiltrated by nest of poorly differentiated malignant cells including  occasional dyskeratotic cells morphologically suggestive of metastatic squamous cell carcinoma.  Immunohistochemical stains are nonspecific; the cells  are positive for cytokeratin 7 and cytokeratin 5/6 (focal) and  negative for cytokeratin 20, TTF-1, p63, GCDFP, mammoglobin, and CEA      PET/CT  4/19/2018 revealed there has been a excellent response to therapy.  At least 90% reduction in malignant activity.    She s/p  cycle 6 of carboplatin/Abraxane completed 7/2018        PET/CT 2/21/2019 increased size and irregularity of the right axillary lymph node with increased FDG avidity     MAMMO/US rt breast 2/2019 revealed suspicious findings rt axilla LN.  Biopsy of the lymph node measuring 1.4 x 1.1 x 1.3 recommended    Pathology 3/11/2019   FINAL PATHOLOGIC DIAGNOSIS  Right axilla, mass, core biopsy:  Positive for poorly differentiated carcinoma.  he staining profile of the current axillary mass match more closely with the previous  breast carcinoma (due to the p63 negativity in both the 2014 breast cancer and the current axillary mass lesion but  p63 positivity in the lung mass from 2018).  This staining profile, together with the comparison with the patient's prior breast tumor and prior lung tumor supports  a diagnosis of poorly differentiated carcinoma and the malignancy appears morphologically similar to the prior  malignancies from both sites, but the staining profile in this small sample from the axillary mass more closely  correlates with the prior breast malignancy. Pathologic subclassification of this malignancy's primary location is not  definitive and clinical correlation is recommended definitively decide on possible primary location in this patient's  axillary mass sample.  Supplemental (2):  Additional immunohistochemical staining for progesterone receptor and HER2 are completed per clinician request  with following results:  Progesterone receptor: Negative; 0% nuclear tumor cell staining.  HER2: Negative; stain score = 0.  Supplemental (3):    Slides sent to [facility] for outside review  It was determined that agree with  interpretation of this  case. In my opinion, the lung tumor corresponds to a squamous cell  carcinoma and appears to be unrelated to the invasive ductal carcinoma of the breast. The axillary mass, in my  opinion, corresponds to metastases of the breast primary. Although it is a poorly differentiated carcinoma, the  immunophenotype is most consistent with the one that the breast primary exhibited, and is inconsistent with a  metastatic squamous cell carcinoma. I      Further testing sent for cancer type ID also sent and results have revealed molecular diagnosis testing revealed head and neck salivary gland carcinoma probability 84% breast adenocarcinoma could not be excluded with a 12% probability      She is s/p AC q21d x 4 cycles completed 9/6/2019   PET/CT 9/19/2019 shows disease response with interval decrease in size and uptake of several right axillary lymph nodes and a supraclavicular lymph node.  Mild residual activity may indicate viable tumor.  No new hypermetabolic findings worrisome for malignancy.    Pt followed by Rad/Onc  She was presented at [facility] tumor board and it was determined to proceed with radiation only  She  completed  RT 12/16/2019   The right axilla and right supraclavicular region was treated at 200 cGy per fraction to 4400 cGy. The PET(+) disease in the right axilla and right supraclavicular fossa will be boosted at 200 cGy per fraction to 6000 cGy total dose.    No new issues today   Appetite and weight stable  No CP/cough/SOB  No hemoptysis   No bleeding-urinary/rectal/nasal   No fevers, night sweats          PrevHx:She had an abnormal mammogram 6/4/2014 which  Revealed a round solid mass 6mm in rt breast.  She then underwent U/S guided core bx of rt breast mass on 6/17/2014.  Pathology revealed infiltrating ductal carcinoma Grade 3 with tumor  Present in thin-walled spaces suggestive of lymphatic spaces. Hormone  Receptor status on tumor specimen revealed ER negative 0% ,  RI negative 0% and Her 2  "jose maria negative.  She subsequently underwent rt segmental mastectomy and SLN bx  On 7/11/2014. Pathology of rt breast lumpectomy revealed invasive  Ductal carcinoma with micropapillary pattern ( Invasive micropapillary  Ca) with max tumor dimension 11.5mm with suggestion of tumor in   Thin walled spaces c/w lymphovascular involvement. Surgical  Margins free of tumor, grade 3, ER/[place] neg , Her 2 jose maria neg   With [place] Lymph node negative for Neoplasm.   Pathologic staging [id number](i-)  Her mother was diagnosed with breast cancer in her 50's/  She has no family history of ovarian cancer.           Review of Systems   Constitutional: Negative for appetite change, fever and unexpected weight change.   HENT: Negative for mouth sores and rhinorrhea.    Eyes: Negative for visual disturbance.   Respiratory: Positive for shortness of breath (mild).    Cardiovascular: Negative for chest pain.   Gastrointestinal: Negative for abdominal pain and diarrhea.   Genitourinary: Negative for frequency.   Musculoskeletal: Positive for arthralgias. Negative for back pain.   Skin: Negative for rash.   Neurological: Negative for dizziness and headaches.   Hematological: Negative for adenopathy.   Psychiatric/Behavioral: The patient is not nervous/anxious.        Objective:        Vitals:    02/03/20 0801   BP: (!) 148/87   Pulse: (!) 57   Temp: 98.1 °F (36.7 °C)   SpO2: 95%   Weight: 82.2 kg (181 lb 3.5 oz)   Height: 5' 3" (1.6 m)         Physical Exam   Constitutional: She is oriented to person, place, and time. She appears well-developed and well-nourished.   HENT:   Head: Normocephalic.   Mouth/Throat: Oropharynx is clear and moist. No oropharyngeal exudate.   Eyes: Pupils are equal, round, and reactive to light. Conjunctivae and lids are normal. No scleral icterus.   Neck: Normal range of motion. Neck supple. No thyromegaly present.   Cardiovascular: Normal rate, regular rhythm and normal heart sounds.   No murmur heard.  Pulmonary/Chest: " Effort normal and breath sounds normal. She has no wheezes.   Abdominal: Soft. Bowel sounds are normal. There is no tenderness. There is no rebound and no guarding.   Musculoskeletal: Normal range of motion. She exhibits no edema or tenderness.   Lymphadenopathy:     She has no cervical adenopathy.     She has no axillary adenopathy.        Right: No supraclavicular adenopathy present.        Left: No supraclavicular adenopathy present.   Neurological: She is alert and oriented to person, place, and time. No cranial nerve deficit. Coordination normal.   Skin: Skin is warm and dry. No ecchymosis, no petechiae and no rash noted. No erythema.   Psychiatric: She has a normal mood and affect.             CT a/p w/contrast 11/29/2018   1. No acute abnormality identified within the abdomen and pelvis.    2.  There are a few nonspecific prominent lymph nodes in the upper abdomen including a periesophageal lymph node which measures 1.0 cm in short axis diameter.    3.  Significant abdominal aortic atherosclerosis and abdominal aorta ectasia.    4.  Additional findings as above.    PET/CT 1/26/2018   1.  Intense FDG uptake within the known right infrahilar lung mass, compatible with malignancy.  It is unclear if this represents primary lung malignancy or metastatic breast cancer.    2.  Intensely hypermetabolic right axillary, retropectoral, and lower right cervical lymph nodes, compatible with metastases.    3.  Abnormal FDG uptake along right breast skin thickening, new from prior chest CTA and mammogram.  This may relate to localized edema/inflammation, though correlation with physical exam and mammography are recommended to exclude underlying inflammatory carcinoma.      MRI Brain w w/out contrast 2/9/2018  1.  No evidence of intracranial metastases.  2.  Sinus disease       Rt axillary LN bx at outside facility- on 1/16/2018 at [facility]  Pathology revealed metastatic poorly differentiated carcinoma of  unknown primary  site 1/16/2018 at [facility]  TTF-1 negative, napsin negative  , cytokeratin 7 positive, cytokeratin 20 negative, p63 negative, cytokeratin 5/6 focal dim positivity    LUNG BIOPSY 1/31/2018  Pathology revealed benign lung tissue         SPECIMEN  1) Right Lung Bx 2/14/2018  Supplemental Diagnosis  Immunohistochemical stains show strong nuclear staining for p63 in essentially all tumor cells and very strong  cytoplasmic and membrane staining for CK5/6, also in essentially all tumor cells. TTF-1 and CK7 are negative  within tumor cells but do stain native pulmonary elements present within the biopsy. A stain for mucicarmine is  negative. Positive and negative controls function appropriately.  Final diagnosis: Specimen submitted as right lung biopsy  -Squamous cell carcinoma  (Electronically Signed: 2018-02-20 09:12:07 )  Diagnosed by: Phong Thompson  FINAL PATHOLOGIC DIAGNOSIS  Fragments of pulmonary parenchyma (submitted as right lung biopsy):    FINAL PATHOLOGIC DIAGNOSIS 1. Lymph node, right axilla, biopsy, review of 10 outside slides [facility], JS 18 1 [phone],  collected January 11, 2018: Metastatic poorly differentiated carcinoma (see comment).  The histologic section shows fibrous stroma infiltrated by nest of poorly differentiated malignant cells including  occasional dyskeratotic cells morphologically suggestive of metastatic squamous cell carcinoma.  Immunohistochemical stains are nonspecific; the cells are positive for cytokeratin 7 and cytokeratin 5/6 (focal) and  negative for cytokeratin 20, TTF-1, p63, GCDFP, mammoglobin, and CEA        PET/CT 2/21/2019  Increased size and irregularity of the right axillary lymph node with increased FDG avidity.  Although this would be atypical in location for lung carcinoma, the increased size and avidity must be concerning for metastatic disease in this patient with history of squamous cell lung cancer.     US Rt breast and  mammo 2/26/2019  Impression:  Right  Lymph Node: Right axilla lymph node. Assessment: 4 - Suspicious finding. Biopsy is recommended.      BI-RADS Category:   Right: 4 - Suspicious  Overall: 4 - Suspicious     Recommendation:  Biopsy is recommended. Biopsy of the lymph node measuring 1.4 x 1.1 x 1.3 recommended , the one with the round shape configuration, corresponding to the most recent PET CT finding.         Pathology 3/11/2019   FINAL PATHOLOGIC DIAGNOSIS  Right axilla, mass, core biopsy:  Positive for poorly differentiated carcinoma.  See comment.  Comment:  The biopsy from the right axilla mass shows a poorly differentiated carcinoma in background lymphoid tissue  with enlarged cells showing increased nuclear size, prominent nucleoli, moderate amounts of cytoplasm and mitotic  figures. Immunostains are completed and reveal the tumor cells to stain positively with cytokeratin AE 1/AE3, CK  5/6 and CK 7. The tumor cells are negative for CK 20, Estrogen receptor, p63 and TTF-1. All stains have  satisfactory positive and negative controls. The patient's prior cases will be reviewed and an additional stain for  JUAREZ-3 is pending in an attempt to pinpoint the primary site of this malignancy and results will follow in a  supplemental report.      Supplemental Diagnosis  3/11/2019  The current axillary mass is compared to the patient's prior right lung biopsy (case number [id number]) and the  current axillary mass is morphologically similar to the lung malignancy. Also, the current axillary mass is compared  to the patient's prior breast resection (Case number [id number]) and appears similar as well. P63 is negative in the  [id number] tumor and CK 5/6 shows scattered positive staining in the [id number] tumor.  Immunostain for JUAREZ-3 is completed and shows only rare weak to moderate staining within the tumor cell nuclei  with satisfactory positive and negative controls. This stain is positive in the surrounding lymphocytes.  This staining  pattern is non-specific and does not definitively differentiate between a lung and breast primary malignancy in this  right axilla mass biopsy. The staining profile of the current axillary mass match more closely with the previous  breast carcinoma (due to the p63 negativity in both the 2014 breast cancer and the current axillary mass lesion but  p63 positivity in the lung mass from 2018).  This staining profile, together with the comparison with the patient's prior breast tumor and prior lung tumor supports  a diagnosis of poorly differentiated carcinoma and the malignancy appears morphologically similar to the prior  malignancies from both sites, but the staining profile in this small sample from the axillary mass more closely  correlates with the prior breast malignancy. Pathologic subclassification of this malignancy's primary location is not  definitive and clinical correlation is recommended definitively decide on possible primary location in this patient's  axillary mass sample.  Supplemental (2):  Additional immunohistochemical staining for progesterone receptor and HER2 are completed per clinician request  with following results:  Progesterone receptor: Negative; 0% nuclear tumor cell staining.  HER2: Negative; stain score = 0.  Supplemental (3):  [place] DIAGNOSIS:  FINAL DIAGNOSIS  Breast, right, needle core biopsy ([id number]; 06/17/2014): Invasive ductal carcinoma, [place] grade III (of  III), with focal micropapillary features.  Immunohistochemical stains performed at the referring institution show that the tumor cells have the following  phenotype: - Estrogen receptor: Negative (0% tumor cells staining). - Progesterone receptor: Negative (0% tumor  cells staining). - HER2: Negative (score 0).  Lymph nodes, right, sentinel biopsy and lumpectomy ([id number]; 07/11/2014):  1. Right sentinel lymph node: A single (1) lymph node is negative for metastatic carcinoma.  Immunohistochemical  stains performed by the referring institution and reviewed at [facility] for cytokeratin are  negative, confirming the diagnosis.  2. Right breast: Invasive ductal carcinoma with micropapillary features, per report 11.5 mm in greatest dimension.  Foci suspicious for lymphovascular invasion identified. Margins of resection are free of tumor.  Immunohistochemical stains performed by the referring institution and reviewed at [facility] show that the tumor  cells are negative for p63 and show patchy positivity for CK 5/6.  Lung, right, needle core biopsy ([id number]; 02/14/2018): Squamous cell carcinoma.    Immunohistochemical stains performed by the referring institution show the tumor cells are strongly positive for p63  and CK 5/6, while negative for TTF-1 and CK7. These result support the diagnosis. Axilla, right, needle core biopsy  ([id number]; 03/11/2019): Lymph node positive for metastatic carcinoma, most consistent with breast primary  (see comment).  Immunohistochemical stains performed by the referring institution and reviewed at [facility] show that the tumor  cells are negative for p63, focally positive for CK 5/6, focally and weakly positive for JUAREZ-3, and strongly positive  for CK7. They are also negative for estrogen receptor, progesterone receptor, and HER2 JAM (score 0).  COMMENT:  Thank you for allowing me to review the case of this 73-year-old lady who recently underwent needle core biopsies  of a right axillary mass and who has a previous diagnosis of an invasive ductal carcinoma of the right breast, as well  as a squamous cell carcinoma of the lung. I am reviewing this case because of my special interest in breast  pathology.  I agree with your interpretation of this case. In my opinion, the lung tumor corresponds to a squamous cell  carcinoma and appears to be unrelated to the invasive ductal carcinoma of the breast. The axillary mass, in my  opinion, corresponds to metastases of the breast  primary. Although it is a poorly differentiated carcinoma, the  immunophenotype is most consistent with the one that the breast primary exhibited, and is inconsistent with a  metastatic squamous cell carcinoma. I did share the case with Dr. Anabel Stone, one of our pulmonary pathologists,  and she concurs with this interpretation.  Note: Report attached.  Performing location:  [facility]  [address] [place] [address]    CT neck/chest/abd/pelvis w/contrast 4/26/2019   The previously described right hilar mass is no longer visible.  There is persistent volume loss in the right middle lobe this appears similar to the prior PET-CT February 21, 2019.    Persistent abnormal right axillary node today measuring about 15 mm compared 18 mm prior.  The positioning of the adjacent nodes is slightly different likely accounting for the slight difference in measurement.    Cluster of tree-in-bud nodular densities left lower lobe series 2, image 93.  This may be related to small airways disease though serial follow-up suggested.      PET/CT 6/20/2019   New and worsening hypermetabolic lymph nodes concerning for metastatic breast cancer as described above.  Tissue sampling would be required for definitive diagnosis.      2-D echo 6/27/2019   · Normal left ventricular systolic function. The estimated ejection fraction is 55%  · Concentric left ventricular remodeling.  · Normal LV diastolic function.  · Normal right ventricular systolic function.  · Moderate left atrial enlargement.  · Mild right atrial enlargement.  · Mild aortic regurgitation.  · Mild mitral regurgitation.  · Mild tricuspid regurgitation.  · Normal central venous pressure (3 mm Hg).  · The estimated PA systolic pressure is 25 mm Hg      PET/CT 9/19/2019   Favorable response to therapy with interval decrease in size and uptake of several right axillary lymph nodes and a supraclavicular lymph node.  Mild residual activity  may indicate viable tumor.    No new hypermetabolic findings worrisome for malignancy.      Assessment:       1. Recurrent breast cancer, unspecified laterality    2. Chronic obstructive pulmonary disease, unspecified COPD type    3.  History of Squamous cell carcinoma of right lung        Plan:   ECOG 0  1-3    Pt with hx of Stage 1A invasive ductal carcinoma rt breast s/p rt segmental mastectomy and SLN bx 7/11/2014 ER/TX neg HER 2 jose maria neg [id number](i-).  S/p adjuvant RT completed 9/2014 Pt declined adjuvant chemo  Follow-up Mammo 6/12/2017 No mammographic evidence of malignancy.  Pt  hospitalized 11/2017 with acute-on-chronic  resp failure  Abnormal CT imaging revealing Large right hilar mass with involvement of  adjacent segmental pulmonary arterial branches and bronchi with associated postobstructive atelectasis  and involvement of mediastinal and axillary adenopathy   S/p rt axillary LN bx ( outside facility) - metastatic poorly differentiated carcinoma of unknown primary  site  status post  lung biopsy 1/31/2017 -benign lung tissue  PET/CT 1/26/2018   Intense FDG uptake within the known right infrahilar lung mass, compatible with malignancy.   Intensely hypermetabolic right axillary, retropectoral, and lower right cervical lymph nodes, compatible with metastases.  Abnormal FDG uptake along right breast skin thickening, new from prior chest CTA and mammogram.    Repeat lung bx 2/14/2018 revealed Squamous Cell CA lung  PD-L1 10% low expression  EGFR NEG  ALK NEG    Pt treated for advanced squamous cell CA of lung   She s/p  cycle 6 of carboplatin/Abraxane Day1 completed 7/2/2018 ( day 15 held due to prolonged cytopenias)  She completed therapy with near CR     PET/CT 2/21/2019 showed increased size and irregularity of the right axillary lymph node with increased FDG avidity    Recent MAMMO/US rt breast reveals suspicious findings rt axilla LN.  Biopsy of the lymph node measuring 1.4 x 1.1 x 1.3     Pt s/p  rt  axillary LN bx  Pathology 3/11/2019   FINAL PATHOLOGIC DIAGNOSIS  Right axilla, mass, core biopsy:  Positive for poorly differentiated carcinoma.- likely breast primary   ERneg PRneg Her 2 neg    Outside review of specimen(s) revealed  lung tumor corresponds to a squamous cell carcinoma and appears to be unrelated to the invasive ductal carcinoma of the breast. It was determined The axillary mass, in my opinion, corresponded  to metastases of the breast primary. Although it is a poorly differentiated carcinoma, theimmunophenotype is most consistent with the one that the breast primary exhibited, and is inconsistent with a metastatic squamous cell carcinoma.     CT imaging 4/26/2019 persistent rt axillary LAD, no other sites of disease     Pf followed by Rad/Onc regarding RT   It was determined to discuss potential surgical intervention involving alnd  Plan to discuss with breast surgeon , Dr. Alexandra potential surgical intervention       Lymph node biopsy 3/11/2019 Right axilla, mass, core biopsy:  Positive for poorly differentiated carcinoma.   favor breast primary   Pt was discussed at multidisciplinary tumor board  D/w Pathologist    PET/CT 6/20/2019  New and worsening hypermetabolic lymph nodes concerning for metastatic breast cancer     Previously Discussed  imaging findings in detail with patient which reveals new and worsening hypermetabolic melena metabolic lymph nodes including hypermetabolic supraclavicular node.  Therefore, at treatment option will be systemic chemotherapy in light of the recent imaging findings.  She will be followed by her surgical oncologist Dr. Christopher      IT was determined to proceed with systemic chemotherapy   She underwent systemic chemotherapy with AC  S/p  AC [id number] x 3 wks x 4 wks completed 9/6/2019   ( pt has not received in past)      PET/CT 9/19/2019 shows disease response with interval decrease in size and uptake of several right axillary lymph nodes and a supraclavicular  lymph node.  Mild residual activity may indicate viable tumor.  No new hypermetabolic findings worrisome for malignancy.    Pt with disease good  response to chemo and no other sites of disease  Pt followed by  Breast Surgery and plan was  for possible   ALND   Pt with Recurrent cancer in her right axilla and right supraclavicular fossa.   She completed chemotherapy 9/6/2019  and has had a near complete response.  It was determined  Radiation will be given to the original areas of hypermetabolic activity in the right axilla and right supraclavicular region  Pt was presented at multidisciplinary tumor board      Pt completed  RT 12/16/2019   Follow-up with Rad/Onc       Pt doing well     Plan imaging studies prior to follow-up in 1month     Follow-up  1month with cbc,cmp prior to f/u     Advance Care Planning     Power of   I initiated the process of advance care planning today and explained the importance of this process to the patient.  I introduced the concept of advance directives to the patient, as well. Then the patient received detailed information about the importance of designating a Health Care Power of  (HCPOA). She was also instructed to communicate with this person about their wishes for future healthcare, should she become sick and lose decision-making capacity. The patient has previously appointed a HCPOA. After our discussion, the patient has decided to complete a HCPOA and has appointed her son and niece and  I spent a total time of 16 minutes discussing this issue with the patient.         Cc: Swetha Katz M.D.         MD Tory Roberson MD Raymond Gould, MD

## 2020-02-03 NOTE — PLAN OF CARE
Patient tolerated monthly portacath flush. Received discharge instructions and follow up appointments. Verbalized understanding and ambulated unassisted off unit by self in NAD.

## 2020-02-07 RX ORDER — DESLORATADINE 5 MG/1
TABLET ORAL
Qty: 30 TABLET | Refills: 5 | Status: ON HOLD | OUTPATIENT
Start: 2020-02-07 | End: 2020-08-13 | Stop reason: CLARIF

## 2020-02-13 ENCOUNTER — TELEPHONE (OUTPATIENT)
Dept: HEMATOLOGY/ONCOLOGY | Facility: CLINIC | Age: 75
End: 2020-02-13

## 2020-02-13 NOTE — TELEPHONE ENCOUNTER
The patient was instructed as per Dr Holliday that she can take the Oxycodone. The patient verbalized understanding.    ----- Message from Lilliana Muñoz, Patient Care Assistant sent at 2/13/2020 10:45 AM CST -----  Contact: pt   Pt stated she had a fall on yesterday and has some soreness and knee pain.  Pt wants to know if she can take her Oxycodone she was prescribed.

## 2020-02-18 ENCOUNTER — CLINICAL SUPPORT (OUTPATIENT)
Dept: FAMILY MEDICINE | Facility: CLINIC | Age: 75
End: 2020-02-18
Payer: MEDICARE

## 2020-02-18 DIAGNOSIS — Z23 NEED FOR 23-POLYVALENT PNEUMOCOCCAL POLYSACCHARIDE VACCINE: Primary | ICD-10-CM

## 2020-02-18 PROCEDURE — G0009 PNEUMOCOCCAL POLYSACCHARIDE VACCINE 23-VALENT =>2YO SQ IM: ICD-10-PCS | Mod: HCNC,S$GLB,, | Performed by: FAMILY MEDICINE

## 2020-02-18 PROCEDURE — G0009 ADMIN PNEUMOCOCCAL VACCINE: HCPCS | Mod: HCNC,S$GLB,, | Performed by: FAMILY MEDICINE

## 2020-02-18 PROCEDURE — 90732 PNEUMOCOCCAL POLYSACCHARIDE VACCINE 23-VALENT =>2YO SQ IM: ICD-10-PCS | Mod: HCNC,S$GLB,, | Performed by: FAMILY MEDICINE

## 2020-02-18 PROCEDURE — 90732 PPSV23 VACC 2 YRS+ SUBQ/IM: CPT | Mod: HCNC,S$GLB,, | Performed by: FAMILY MEDICINE

## 2020-02-28 ENCOUNTER — HOSPITAL ENCOUNTER (OUTPATIENT)
Dept: RADIOLOGY | Facility: HOSPITAL | Age: 75
Discharge: HOME | End: 2020-02-28
Attending: INTERNAL MEDICINE
Payer: MEDICARE

## 2020-02-28 DIAGNOSIS — C50.811 MALIGNANT NEOPLASM OF OVERLAPPING SITES OF RIGHT FEMALE BREAST, UNSPECIFIED ESTROGEN RECEPTOR STATUS: ICD-10-CM

## 2020-02-28 DIAGNOSIS — C50.919 RECURRENT BREAST CANCER, UNSPECIFIED LATERALITY: ICD-10-CM

## 2020-02-28 PROCEDURE — 78815 PET IMAGE W/CT SKULL-THIGH: CPT | Mod: 26,HCNC,PS, | Performed by: RADIOLOGY

## 2020-02-28 PROCEDURE — A9552 F18 FDG: HCPCS | Mod: HCNC

## 2020-02-28 PROCEDURE — 78815 NM PET CT ROUTINE: ICD-10-PCS | Mod: 26,HCNC,PS, | Performed by: RADIOLOGY

## 2020-02-28 PROCEDURE — 78815 PET IMAGE W/CT SKULL-THIGH: CPT | Mod: TC,HCNC

## 2020-03-02 ENCOUNTER — OFFICE VISIT (OUTPATIENT)
Dept: HEMATOLOGY/ONCOLOGY | Facility: CLINIC | Age: 75
End: 2020-03-02
Payer: MEDICARE

## 2020-03-02 ENCOUNTER — INFUSION (OUTPATIENT)
Dept: INFUSION THERAPY | Facility: HOSPITAL | Age: 75
End: 2020-03-02
Attending: INTERNAL MEDICINE
Payer: MEDICARE

## 2020-03-02 ENCOUNTER — HOSPITAL ENCOUNTER (OUTPATIENT)
Dept: RADIOLOGY | Facility: HOSPITAL | Age: 75
Discharge: HOME | End: 2020-03-02
Attending: INTERNAL MEDICINE
Payer: MEDICARE

## 2020-03-02 VITALS
OXYGEN SATURATION: 97 % | DIASTOLIC BLOOD PRESSURE: 76 MMHG | HEART RATE: 59 BPM | SYSTOLIC BLOOD PRESSURE: 143 MMHG | RESPIRATION RATE: 17 BRPM | TEMPERATURE: 98 F

## 2020-03-02 VITALS
HEART RATE: 66 BPM | SYSTOLIC BLOOD PRESSURE: 121 MMHG | HEIGHT: 63 IN | OXYGEN SATURATION: 94 % | DIASTOLIC BLOOD PRESSURE: 81 MMHG | TEMPERATURE: 98 F | BODY MASS INDEX: 32.54 KG/M2 | WEIGHT: 183.63 LBS

## 2020-03-02 DIAGNOSIS — J44.9 CHRONIC OBSTRUCTIVE PULMONARY DISEASE, UNSPECIFIED COPD TYPE: ICD-10-CM

## 2020-03-02 DIAGNOSIS — R05.9 COUGH: ICD-10-CM

## 2020-03-02 DIAGNOSIS — C34.90 SQUAMOUS CELL CARCINOMA LUNG: Primary | ICD-10-CM

## 2020-03-02 DIAGNOSIS — C50.919 RECURRENT BREAST CANCER, UNSPECIFIED LATERALITY: Primary | ICD-10-CM

## 2020-03-02 DIAGNOSIS — C34.91 SQUAMOUS CELL CARCINOMA OF RIGHT LUNG: ICD-10-CM

## 2020-03-02 PROCEDURE — 99499 RISK ADDL DX/OHS AUDIT: ICD-10-PCS | Mod: HCNC,S$GLB,, | Performed by: INTERNAL MEDICINE

## 2020-03-02 PROCEDURE — 63600175 PHARM REV CODE 636 W HCPCS: Mod: HCNC | Performed by: INTERNAL MEDICINE

## 2020-03-02 PROCEDURE — 1159F PR MEDICATION LIST DOCUMENTED IN MEDICAL RECORD: ICD-10-PCS | Mod: HCNC,S$GLB,, | Performed by: INTERNAL MEDICINE

## 2020-03-02 PROCEDURE — 3074F SYST BP LT 130 MM HG: CPT | Mod: HCNC,CPTII,S$GLB, | Performed by: INTERNAL MEDICINE

## 2020-03-02 PROCEDURE — 3079F PR MOST RECENT DIASTOLIC BLOOD PRESSURE 80-89 MM HG: ICD-10-PCS | Mod: HCNC,CPTII,S$GLB, | Performed by: INTERNAL MEDICINE

## 2020-03-02 PROCEDURE — 1101F PR PT FALLS ASSESS DOC 0-1 FALLS W/OUT INJ PAST YR: ICD-10-PCS | Mod: HCNC,CPTII,S$GLB, | Performed by: INTERNAL MEDICINE

## 2020-03-02 PROCEDURE — 99999 PR PBB SHADOW E&M-EST. PATIENT-LVL III: CPT | Mod: PBBFAC,HCNC,, | Performed by: INTERNAL MEDICINE

## 2020-03-02 PROCEDURE — 3074F PR MOST RECENT SYSTOLIC BLOOD PRESSURE < 130 MM HG: ICD-10-PCS | Mod: HCNC,CPTII,S$GLB, | Performed by: INTERNAL MEDICINE

## 2020-03-02 PROCEDURE — 1126F AMNT PAIN NOTED NONE PRSNT: CPT | Mod: HCNC,S$GLB,, | Performed by: INTERNAL MEDICINE

## 2020-03-02 PROCEDURE — 96523 IRRIG DRUG DELIVERY DEVICE: CPT | Mod: HCNC

## 2020-03-02 PROCEDURE — 1101F PT FALLS ASSESS-DOCD LE1/YR: CPT | Mod: HCNC,CPTII,S$GLB, | Performed by: INTERNAL MEDICINE

## 2020-03-02 PROCEDURE — 1126F PR PAIN SEVERITY QUANTIFIED, NO PAIN PRESENT: ICD-10-PCS | Mod: HCNC,S$GLB,, | Performed by: INTERNAL MEDICINE

## 2020-03-02 PROCEDURE — 71046 X-RAY EXAM CHEST 2 VIEWS: CPT | Mod: TC,HCNC,FY

## 2020-03-02 PROCEDURE — 71046 X-RAY EXAM CHEST 2 VIEWS: CPT | Mod: 26,HCNC,, | Performed by: RADIOLOGY

## 2020-03-02 PROCEDURE — 99999 PR PBB SHADOW E&M-EST. PATIENT-LVL III: ICD-10-PCS | Mod: PBBFAC,HCNC,, | Performed by: INTERNAL MEDICINE

## 2020-03-02 PROCEDURE — 99499 UNLISTED E&M SERVICE: CPT | Mod: HCNC,S$GLB,, | Performed by: INTERNAL MEDICINE

## 2020-03-02 PROCEDURE — 25000003 PHARM REV CODE 250: Mod: HCNC | Performed by: INTERNAL MEDICINE

## 2020-03-02 PROCEDURE — A4216 STERILE WATER/SALINE, 10 ML: HCPCS | Mod: HCNC | Performed by: INTERNAL MEDICINE

## 2020-03-02 PROCEDURE — 99214 PR OFFICE/OUTPT VISIT, EST, LEVL IV, 30-39 MIN: ICD-10-PCS | Mod: HCNC,S$GLB,, | Performed by: INTERNAL MEDICINE

## 2020-03-02 PROCEDURE — 99214 OFFICE O/P EST MOD 30 MIN: CPT | Mod: HCNC,S$GLB,, | Performed by: INTERNAL MEDICINE

## 2020-03-02 PROCEDURE — 71046 XR CHEST PA AND LATERAL: ICD-10-PCS | Mod: 26,HCNC,, | Performed by: RADIOLOGY

## 2020-03-02 PROCEDURE — 1159F MED LIST DOCD IN RCRD: CPT | Mod: HCNC,S$GLB,, | Performed by: INTERNAL MEDICINE

## 2020-03-02 PROCEDURE — 3079F DIAST BP 80-89 MM HG: CPT | Mod: HCNC,CPTII,S$GLB, | Performed by: INTERNAL MEDICINE

## 2020-03-02 RX ORDER — HEPARIN 100 UNIT/ML
500 SYRINGE INTRAVENOUS
Status: DISCONTINUED | OUTPATIENT
Start: 2020-03-02 | End: 2020-03-02 | Stop reason: HOSPADM

## 2020-03-02 RX ORDER — SODIUM CHLORIDE 0.9 % (FLUSH) 0.9 %
10 SYRINGE (ML) INJECTION
Status: DISCONTINUED | OUTPATIENT
Start: 2020-03-02 | End: 2020-03-02 | Stop reason: HOSPADM

## 2020-03-02 RX ADMIN — Medication 10 ML: at 08:03

## 2020-03-02 RX ADMIN — HEPARIN 500 UNITS: 100 SYRINGE at 08:03

## 2020-03-02 NOTE — PLAN OF CARE
Patient arrived to infusion center for monthly port flush. Tolerated well. VSS. Discharge instructions provided. Patient verbalized understanding and ambulated unassisted off unit by self, in NAD at time of discharge.

## 2020-03-02 NOTE — PROGRESS NOTES
Subjective:       Patient ID: Ade Castellano is a 74 y.o. female.    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   Pathology showed a 1.15 cm, grade 3 infiltrating ductal carcinoma.  One sentinel lymph node was negative for malignancy.  Margins were negative and the closest margin was 1 cm.  She was staged a little pT1c little pN0 cM0 stage IA.  She declined adjuvant chemo therapy.  She completed post operative radiation therapy at 400 cGy per fraction to 4000 cGy 9/2014         Pt hospitalized 11/2017   Ms. Castellano was admitted for acute on chronic respiratory failure requiring intubation and ventilation. Has large lung mass in right lung with probable post obstructive pneumonia resulting in COPD exacerbation. But also, patient had ran out of home O2 prior to onset of symptoms, likely contributing to presentation. Initiated on broad spectrum abx, IV steroids, duo-nebs and pulmonology consulted for ventilator co-management. Successfully extubated to BiPAP on 11/30. Then de-escalated to low flow nasal cannula. Abx tailored to augmentin for broad coverage, including anaerobic bacteria /     CTA chest 11/29/2017 revealed   Large right hilar mass with involvement of adjacent segmental pulmonary arterial branches and bronchi with associated postobstructive atelectasis and volume loss in the right middle lobe with adjacent ground glass opacity.  Findings highly concerning for underlying neoplastic process. Mediastinal and axillary adenopathy, with a right axillary lymph node measuring up to 2.0 cm in short axis  diameter. No definite evidence of pulmonary thromboembolism.    She is followed by Pulmonology   She underwent rt axillary LN bx at outside facility- on 1/16/2018 at [facility]  Pathology revealed metastatic poorly differentiated carcinoma of unknown primary site  TTF-1 negative, napsin negative  , cytokeratin 7 positive, cytokeratin 20 negative, p63 negative, cytokeratin 5/6 focal dim positivity    She next underwent lung bx at [facility] 1/31/2018   Pathology revealed benign lung tissue    She underwent Repeat Right Lung Bx 2/14/2018 Pathology reveals Squamous cell carcinoma  PD-L1 10% low expression  EGFR NEG  ALK NEG  BRAF NEG      Outside slides axillary LN specimen  requested for review/comparison to lung bx findings     1) Right Lung Bx 2/14/2018  Supplemental Diagnosis  Immunohistochemical stains show strong nuclear staining for p63 in essentially all tumor cells and very strong  cytoplasmic and membrane staining for CK5/6, also in essentially all tumor cells. TTF-1 and CK7 are negative  within tumor cells but do stain native pulmonary elements present within the biopsy. A stain for mucicarmine is  negative. Positive and negative controls function appropriately.  Final diagnosis: Specimen submitted as right lung biopsy  -Squamous cell carcinoma  (Electronically Signed: 2018-02-20 09:12:07 )  Diagnosed by: Phong Thompson  FINAL PATHOLOGIC DIAGNOSIS  Fragments of pulmonary parenchyma (submitted as right lung biopsy):    FINAL PATHOLOGIC DIAGNOSIS 1. Lymph node, right axilla, biopsy, review of 10 outside slides [facility], JS 18 [id number],  collected January 11, 2018: Metastatic poorly differentiated carcinoma (see comment).  The histologic section shows fibrous stroma infiltrated by nest of poorly differentiated malignant cells including  occasional dyskeratotic cells morphologically suggestive of metastatic squamous cell carcinoma.  Immunohistochemical stains are nonspecific; the cells  are positive for cytokeratin 7 and cytokeratin 5/6 (focal) and  negative for cytokeratin 20, TTF-1, p63, GCDFP, mammoglobin, and CEA      PET/CT  4/19/2018 revealed there has been a excellent response to therapy.  At least 90% reduction in malignant activity.    She s/p  cycle 6 of carboplatin/Abraxane completed 7/2018        PET/CT 2/21/2019 increased size and irregularity of the right axillary lymph node with increased FDG avidity     MAMMO/US rt breast 2/2019 revealed suspicious findings rt axilla LN.  Biopsy of the lymph node measuring 1.4 x 1.1 x 1.3 recommended    Pathology 3/11/2019   FINAL PATHOLOGIC DIAGNOSIS  Right axilla, mass, core biopsy:  Positive for poorly differentiated carcinoma.  he staining profile of the current axillary mass match more closely with the previous  breast carcinoma (due to the p63 negativity in both the 2014 breast cancer and the current axillary mass lesion but  p63 positivity in the lung mass from 2018).  This staining profile, together with the comparison with the patient's prior breast tumor and prior lung tumor supports  a diagnosis of poorly differentiated carcinoma and the malignancy appears morphologically similar to the prior  malignancies from both sites, but the staining profile in this small sample from the axillary mass more closely  correlates with the prior breast malignancy. Pathologic subclassification of this malignancy's primary location is not  definitive and clinical correlation is recommended definitively decide on possible primary location in this patient's  axillary mass sample.  Supplemental (2):  Additional immunohistochemical staining for progesterone receptor and HER2 are completed per clinician request  with following results:  Progesterone receptor: Negative; 0% nuclear tumor cell staining.  HER2: Negative; stain score = 0.  Supplemental (3):    Slides sent to [facility] for outside review  It was determined that agree with  interpretation of this  case. In my opinion, the lung tumor corresponds to a squamous cell  carcinoma and appears to be unrelated to the invasive ductal carcinoma of the breast. The axillary mass, in my  opinion, corresponds to metastases of the breast primary. Although it is a poorly differentiated carcinoma, the  immunophenotype is most consistent with the one that the breast primary exhibited, and is inconsistent with a  metastatic squamous cell carcinoma. I      Further testing sent for cancer type ID also sent and results have revealed molecular diagnosis testing revealed head and neck salivary gland carcinoma probability 84% breast adenocarcinoma could not be excluded with a 12% probability      She is s/p AC q21d x 4 cycles completed 9/6/2019   PET/CT 9/19/2019 shows disease response with interval decrease in size and uptake of several right axillary lymph nodes and a supraclavicular lymph node.  Mild residual activity may indicate viable tumor.  No new hypermetabolic findings worrisome for malignancy.    Pt followed by Rad/Onc  She was presented at [facility] tumor board and it was determined to proceed with radiation only  She  completed  RT 12/16/2019   The right axilla and right supraclavicular region was treated at 200 cGy per fraction to 4400 cGy. The PET(+) disease in the right axilla and right supraclavicular fossa will be boosted at 200 cGy per fraction to 6000 cGy total dose.    She reports cough x 1 month   She continues with LOGAN   No fevers  Appetite and weight stable  No CP  No hemoptysis   No bleeding-urinary/rectal/nasal   She reports reflux symptoms     PET/CT 2/28/2020  1.  No evidence of hypermetabolic tumor.  Continued improvement of the right axilla status post adjuvant radiation.    2.  No new hypermetabolic lesions        PrevHx:She had an abnormal mammogram 6/4/2014 which  Revealed a round solid mass 6mm in rt breast.  She then underwent U/S guided core bx of rt breast mass on 6/17/2014.  Pathology  "revealed infiltrating ductal carcinoma Grade 3 with tumor  Present in thin-walled spaces suggestive of lymphatic spaces. Hormone  Receptor status on tumor specimen revealed ER negative 0% ,  WV negative 0% and Her 2 jose maria negative.  She subsequently underwent rt segmental mastectomy and SLN bx  On 7/11/2014. Pathology of rt breast lumpectomy revealed invasive  Ductal carcinoma with micropapillary pattern ( Invasive micropapillary  Ca) with max tumor dimension 11.5mm with suggestion of tumor in   Thin walled spaces c/w lymphovascular involvement. Surgical  Margins free of tumor, grade 3, ER/WV neg , Her 2 jose maria neg   With [place] Lymph node negative for Neoplasm.   Pathologic staging [id number](i-)  Her mother was diagnosed with breast cancer in her 50's/  She has no family history of ovarian cancer.           Review of Systems   Constitutional: Negative for appetite change, fever and unexpected weight change.   HENT: Negative for mouth sores and rhinorrhea.    Eyes: Negative for visual disturbance.   Respiratory: Positive for cough and shortness of breath (mild).    Cardiovascular: Negative for chest pain.   Gastrointestinal: Negative for abdominal pain and diarrhea.   Genitourinary: Negative for frequency.   Musculoskeletal: Positive for arthralgias. Negative for back pain.   Skin: Negative for rash.   Neurological: Negative for dizziness and headaches.   Hematological: Negative for adenopathy.   Psychiatric/Behavioral: The patient is not nervous/anxious.        Objective:        Vitals:    03/02/20 0807   BP: 121/81   BP Location: Left arm   Patient Position: Sitting   BP Method: Medium (Automatic)   Pulse: 66   Temp: 98.1 °F (36.7 °C)   TempSrc: Oral   SpO2: (!) 94%   Weight: 83.3 kg (183 lb 10.3 oz)   Height: 5' 3" (1.6 m)         Physical Exam   Constitutional: She is oriented to person, place, and time. She appears well-developed and well-nourished.   HENT:   Head: Normocephalic.   Mouth/Throat: Oropharynx is clear " and moist. No oropharyngeal exudate.   Eyes: Pupils are equal, round, and reactive to light. Conjunctivae and lids are normal. No scleral icterus.   Neck: Normal range of motion. Neck supple. No thyromegaly present.   Cardiovascular: Normal rate, regular rhythm and normal heart sounds.   No murmur heard.  Pulmonary/Chest: Effort normal and breath sounds normal. She has no wheezes.   Abdominal: Soft. Bowel sounds are normal. There is no tenderness. There is no rebound and no guarding.   Musculoskeletal: Normal range of motion. She exhibits no edema or tenderness.   Lymphadenopathy:     She has no cervical adenopathy.     She has no axillary adenopathy.        Right: No supraclavicular adenopathy present.        Left: No supraclavicular adenopathy present.   Neurological: She is alert and oriented to person, place, and time. No cranial nerve deficit. Coordination normal.   Skin: Skin is warm and dry. No ecchymosis, no petechiae and no rash noted. No erythema.   Psychiatric: She has a normal mood and affect.             CT a/p w/contrast 11/29/2018   1. No acute abnormality identified within the abdomen and pelvis.    2.  There are a few nonspecific prominent lymph nodes in the upper abdomen including a periesophageal lymph node which measures 1.0 cm in short axis diameter.    3.  Significant abdominal aortic atherosclerosis and abdominal aorta ectasia.    4.  Additional findings as above.    PET/CT 1/26/2018   1.  Intense FDG uptake within the known right infrahilar lung mass, compatible with malignancy.  It is unclear if this represents primary lung malignancy or metastatic breast cancer.    2.  Intensely hypermetabolic right axillary, retropectoral, and lower right cervical lymph nodes, compatible with metastases.    3.  Abnormal FDG uptake along right breast skin thickening, new from prior chest CTA and mammogram.  This may relate to localized edema/inflammation, though correlation with physical exam and  mammography are recommended to exclude underlying inflammatory carcinoma.      MRI Brain w w/out contrast 2/9/2018  1.  No evidence of intracranial metastases.  2.  Sinus disease       Rt axillary LN bx at outside facility- on 1/16/2018 at [facility]  Pathology revealed metastatic poorly differentiated carcinoma of unknown primary  site 1/16/2018 at [facility]  TTF-1 negative, napsin negative  , cytokeratin 7 positive, cytokeratin 20 negative, p63 negative, cytokeratin 5/6 focal dim positivity    LUNG BIOPSY 1/31/2018  Pathology revealed benign lung tissue         SPECIMEN  1) Right Lung Bx 2/14/2018  Supplemental Diagnosis  Immunohistochemical stains show strong nuclear staining for p63 in essentially all tumor cells and very strong  cytoplasmic and membrane staining for CK5/6, also in essentially all tumor cells. TTF-1 and CK7 are negative  within tumor cells but do stain native pulmonary elements present within the biopsy. A stain for mucicarmine is  negative. Positive and negative controls function appropriately.  Final diagnosis: Specimen submitted as right lung biopsy  -Squamous cell carcinoma  (Electronically Signed: 2018-02-20 09:12:07 )  Diagnosed by: Phong Thompson  FINAL PATHOLOGIC DIAGNOSIS  Fragments of pulmonary parenchyma (submitted as right lung biopsy):    FINAL PATHOLOGIC DIAGNOSIS 1. Lymph node, right axilla, biopsy, review of 10 outside slides [facility], JS 18 1 [id number],  collected January 11, 2018: Metastatic poorly differentiated carcinoma (see comment).  The histologic section shows fibrous stroma infiltrated by nest of poorly differentiated malignant cells including  occasional dyskeratotic cells morphologically suggestive of metastatic squamous cell carcinoma.  Immunohistochemical stains are nonspecific; the cells are positive for cytokeratin 7 and cytokeratin 5/6 (focal) and  negative for cytokeratin 20, TTF-1, p63, GCDFP, mammoglobin, and  CEA        PET/CT 2/21/2019  Increased size and irregularity of the right axillary lymph node with increased FDG avidity.  Although this would be atypical in location for lung carcinoma, the increased size and avidity must be concerning for metastatic disease in this patient with history of squamous cell lung cancer.     US Rt breast and mammo 2/26/2019  Impression:  Right  Lymph Node: Right axilla lymph node. Assessment: 4 - Suspicious finding. Biopsy is recommended.      BI-RADS Category:   Right: 4 - Suspicious  Overall: 4 - Suspicious     Recommendation:  Biopsy is recommended. Biopsy of the lymph node measuring 1.4 x 1.1 x 1.3 recommended , the one with the round shape configuration, corresponding to the most recent PET CT finding.         Pathology 3/11/2019   FINAL PATHOLOGIC DIAGNOSIS  Right axilla, mass, core biopsy:  Positive for poorly differentiated carcinoma.  See comment.  Comment:  The biopsy from the right axilla mass shows a poorly differentiated carcinoma in background lymphoid tissue  with enlarged cells showing increased nuclear size, prominent nucleoli, moderate amounts of cytoplasm and mitotic  figures. Immunostains are completed and reveal the tumor cells to stain positively with cytokeratin AE 1/AE3, CK  5/6 and CK 7. The tumor cells are negative for CK 20, Estrogen receptor, p63 and TTF-1. All stains have  satisfactory positive and negative controls. The patient's prior cases will be reviewed and an additional stain for  JUAREZ-3 is pending in an attempt to pinpoint the primary site of this malignancy and results will follow in a  supplemental report.      Supplemental Diagnosis  3/11/2019  The current axillary mass is compared to the patient's prior right lung biopsy (case number [id number]) and the  current axillary mass is morphologically similar to the lung malignancy. Also, the current axillary mass is compared  to the patient's prior breast resection (Case number [id number]) and appears  similar as well. P63 is negative in the  [id number] tumor and CK 5/6 shows scattered positive staining in the [id number] tumor.  Immunostain for JUAREZ-3 is completed and shows only rare weak to moderate staining within the tumor cell nuclei  with satisfactory positive and negative controls. This stain is positive in the surrounding lymphocytes. This staining  pattern is non-specific and does not definitively differentiate between a lung and breast primary malignancy in this  right axilla mass biopsy. The staining profile of the current axillary mass match more closely with the previous  breast carcinoma (due to the p63 negativity in both the 2014 breast cancer and the current axillary mass lesion but  p63 positivity in the lung mass from 2018).  This staining profile, together with the comparison with the patient's prior breast tumor and prior lung tumor supports  a diagnosis of poorly differentiated carcinoma and the malignancy appears morphologically similar to the prior  malignancies from both sites, but the staining profile in this small sample from the axillary mass more closely  correlates with the prior breast malignancy. Pathologic subclassification of this malignancy's primary location is not  definitive and clinical correlation is recommended definitively decide on possible primary location in this patient's  axillary mass sample.  Supplemental (2):  Additional immunohistochemical staining for progesterone receptor and HER2 are completed per clinician request  with following results:  Progesterone receptor: Negative; 0% nuclear tumor cell staining.  HER2: Negative; stain score = 0.  Supplemental (3):  [place] DIAGNOSIS:  FINAL DIAGNOSIS  Breast, right, needle core biopsy ([id number]; 06/17/2014): Invasive ductal carcinoma, Jaiden grade III (of  III), with focal micropapillary features.  Immunohistochemical stains performed at the referring institution show that the tumor cells have the following  phenotype:  - Estrogen receptor: Negative (0% tumor cells staining). - Progesterone receptor: Negative (0% tumor  cells staining). - HER2: Negative (score 0).  Lymph nodes, right, sentinel biopsy and lumpectomy ([id number]; 07/11/2014):  1. Right sentinel lymph node: A single (1) lymph node is negative for metastatic carcinoma.  Immunohistochemical stains performed by the referring institution and reviewed at [facility] for cytokeratin are  negative, confirming the diagnosis.  2. Right breast: Invasive ductal carcinoma with micropapillary features, per report 11.5 mm in greatest dimension.  Foci suspicious for lymphovascular invasion identified. Margins of resection are free of tumor.  Immunohistochemical stains performed by the referring institution and reviewed at [facility] show that the tumor  cells are negative for p63 and show patchy positivity for CK 5/6.  Lung, right, needle core biopsy ([id number]; 02/14/2018): Squamous cell carcinoma.    Immunohistochemical stains performed by the referring institution show the tumor cells are strongly positive for p63  and CK 5/6, while negative for TTF-1 and CK7. These result support the diagnosis. Axilla, right, needle core biopsy  ([id number]; 03/11/2019): Lymph node positive for metastatic carcinoma, most consistent with breast primary  (see comment).  Immunohistochemical stains performed by the referring institution and reviewed at [facility] show that the tumor  cells are negative for p63, focally positive for CK 5/6, focally and weakly positive for JUAREZ-3, and strongly positive  for CK7. They are also negative for estrogen receptor, progesterone receptor, and HER2 JAM (score 0).  COMMENT:  Thank you for allowing me to review the case of this 73-year-old lady who recently underwent needle core biopsies  of a right axillary mass and who has a previous diagnosis of an invasive ductal carcinoma of the right breast, as well  as a squamous cell carcinoma of the lung. I am  reviewing this case because of my special interest in breast  pathology.  I agree with your interpretation of this case. In my opinion, the lung tumor corresponds to a squamous cell  carcinoma and appears to be unrelated to the invasive ductal carcinoma of the breast. The axillary mass, in my  opinion, corresponds to metastases of the breast primary. Although it is a poorly differentiated carcinoma, the  immunophenotype is most consistent with the one that the breast primary exhibited, and is inconsistent with a  metastatic squamous cell carcinoma. I did share the case with Dr. Anabel Stone, one of our pulmonary pathologists,  and she concurs with this interpretation.  Note: Report attached.  Performing location:  [facility]  [address] [place] [address]    CT neck/chest/abd/pelvis w/contrast 4/26/2019   The previously described right hilar mass is no longer visible.  There is persistent volume loss in the right middle lobe this appears similar to the prior PET-CT February 21, 2019.    Persistent abnormal right axillary node today measuring about 15 mm compared 18 mm prior.  The positioning of the adjacent nodes is slightly different likely accounting for the slight difference in measurement.    Cluster of tree-in-bud nodular densities left lower lobe series 2, image 93.  This may be related to small airways disease though serial follow-up suggested.      PET/CT 6/20/2019   New and worsening hypermetabolic lymph nodes concerning for metastatic breast cancer as described above.  Tissue sampling would be required for definitive diagnosis.      2-D echo 6/27/2019   · Normal left ventricular systolic function. The estimated ejection fraction is 55%  · Concentric left ventricular remodeling.  · Normal LV diastolic function.  · Normal right ventricular systolic function.  · Moderate left atrial enlargement.  · Mild right atrial enlargement.  · Mild aortic regurgitation.  · Mild  mitral regurgitation.  · Mild tricuspid regurgitation.  · Normal central venous pressure (3 mm Hg).  · The estimated PA systolic pressure is 25 mm Hg      PET/CT 9/19/2019   Favorable response to therapy with interval decrease in size and uptake of several right axillary lymph nodes and a supraclavicular lymph node.  Mild residual activity may indicate viable tumor.    No new hypermetabolic findings worrisome for malignancy.    PET/CT 2/28/2020  1.  No evidence of hypermetabolic tumor.  Continued improvement of the right axilla status post adjuvant radiation.    2.  No new hypermetabolic lesions    Assessment:       1. Recurrent breast cancer, unspecified laterality    2.  History of Squamous cell carcinoma of right lung    3. Chronic obstructive pulmonary disease, unspecified COPD type        Plan:   ECOG 0  1-3    Pt with hx of Stage 1A invasive ductal carcinoma rt breast s/p rt segmental mastectomy and SLN bx 7/11/2014 ER/DC neg HER 2 jose maria neg [id number](i-).  S/p adjuvant RT completed 9/2014 Pt declined adjuvant chemo  Follow-up Mammo 6/12/2017 No mammographic evidence of malignancy.  Pt  hospitalized 11/2017 with acute-on-chronic  resp failure  Abnormal CT imaging revealing Large right hilar mass with involvement of  adjacent segmental pulmonary arterial branches and bronchi with associated postobstructive atelectasis  and involvement of mediastinal and axillary adenopathy   S/p rt axillary LN bx ( outside facility) - metastatic poorly differentiated carcinoma of unknown primary  site  status post  lung biopsy 1/31/2017 -benign lung tissue  PET/CT 1/26/2018   Intense FDG uptake within the known right infrahilar lung mass, compatible with malignancy.   Intensely hypermetabolic right axillary, retropectoral, and lower right cervical lymph nodes, compatible with metastases.  Abnormal FDG uptake along right breast skin thickening, new from prior chest CTA and mammogram.    Repeat lung bx 2/14/2018 revealed Squamous  Cell CA lung  PD-L1 10% low expression  EGFR NEG  ALK NEG    Pt treated for advanced squamous cell CA of lung   She s/p  cycle 6 of carboplatin/Abraxane Day1 completed 7/2/2018 ( day 15 held due to prolonged cytopenias)  She completed therapy with near CR     PET/CT 2/21/2019 showed increased size and irregularity of the right axillary lymph node with increased FDG avidity    Recent MAMMO/US rt breast reveals suspicious findings rt axilla LN.  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 It was determined The axillary mass, in my opinion, corresponded  to metastases of the breast primary. Although it is a poorly differentiated carcinoma, theimmunophenotype is most consistent with the one that the breast primary exhibited, and is inconsistent with a metastatic squamous cell carcinoma.     CT imaging 4/26/2019 persistent rt axillary LAD, no other sites of disease     Pf followed by Rad/Onc regarding RT   It was determined to discuss potential surgical intervention involving alnd  Plan to discuss with breast surgeon , Dr. Alexandra potential surgical intervention       Lymph node biopsy 3/11/2019 Right axilla, mass, core biopsy:  Positive for poorly differentiated carcinoma.   favor breast primary   Pt was discussed at multidisciplinary tumor board  D/w Pathologist    PET/CT 6/20/2019  New and worsening hypermetabolic lymph nodes concerning for metastatic breast cancer     Previously Discussed  imaging findings in detail with patient which reveals new and worsening hypermetabolic melena metabolic lymph nodes including hypermetabolic  supraclavicular node.  Therefore, at treatment option will be systemic chemotherapy in light of the recent imaging findings.  She will be followed by her surgical oncologist Dr. Christopher      IT was determined to proceed with systemic chemotherapy   She underwent systemic chemotherapy with AC  S/p  AC [id number] x 3 wks x 4 wks completed 9/6/2019   ( pt has not received in past)      PET/CT 9/19/2019 shows disease response with interval decrease in size and uptake of several right axillary lymph nodes and a supraclavicular lymph node.  Mild residual activity may indicate viable tumor.  No new hypermetabolic findings worrisome for malignancy.    Pt with disease good  response to chemo and no other sites of disease  Pt followed by  Breast Surgery and plan was  for possible   ALND   Pt with Recurrent cancer in her right axilla and right supraclavicular fossa.   She completed chemotherapy 9/6/2019  and has had a near complete response.  It was determined  Radiation will be given to the original areas of hypermetabolic activity in the right axilla and right supraclavicular region  Pt was presented at multidisciplinary tumor board      Pt completed  RT 12/16/2019   Follow-up with Rad/Onc     PET/CT 2/28/2020  No evidence of hypermetabolic tumor.  Continued improvement of the right axilla status post adjuvant radiation.    No new hypermetabolic lesions     Plan CXR today and f/u with Pulmonology   Pt doing well     Plan imaging studies prior to follow-up in 1month     Follow-up  1month with cbc,cmp prior to f/u     Advance Care Planning     Power of   I initiated the process of advance care planning today and explained the importance of this process to the patient.  I introduced the concept of advance directives to the patient, as well. Then the patient received detailed information about the importance of designating a Health Care Power of  (HCPOA). She was also instructed to communicate with this person about  their wishes for future healthcare, should she become sick and lose decision-making capacity. The patient has previously appointed a HCPOA. After our discussion, the patient has decided to complete a HCPOA and has appointed her son and niece and  I spent a total time of 16 minutes discussing this issue with the patient.         Cc: Swetha Katz M.D.         MD Tory Roberson MD Raymond Gould, MD

## 2020-03-03 ENCOUNTER — TELEPHONE (OUTPATIENT)
Dept: HEMATOLOGY/ONCOLOGY | Facility: CLINIC | Age: 75
End: 2020-03-03

## 2020-03-09 ENCOUNTER — OFFICE VISIT (OUTPATIENT)
Dept: FAMILY MEDICINE | Facility: CLINIC | Age: 75
End: 2020-03-09
Payer: MEDICARE

## 2020-03-09 VITALS
BODY MASS INDEX: 32.43 KG/M2 | WEIGHT: 183 LBS | OXYGEN SATURATION: 96 % | HEIGHT: 63 IN | HEART RATE: 60 BPM | SYSTOLIC BLOOD PRESSURE: 110 MMHG | TEMPERATURE: 97 F | DIASTOLIC BLOOD PRESSURE: 70 MMHG

## 2020-03-09 DIAGNOSIS — J18.9 ATYPICAL PNEUMONIA: ICD-10-CM

## 2020-03-09 DIAGNOSIS — R25.2 LEG CRAMP: Primary | ICD-10-CM

## 2020-03-09 PROCEDURE — 3078F DIAST BP <80 MM HG: CPT | Mod: HCNC,CPTII,S$GLB, | Performed by: FAMILY MEDICINE

## 2020-03-09 PROCEDURE — 3074F PR MOST RECENT SYSTOLIC BLOOD PRESSURE < 130 MM HG: ICD-10-PCS | Mod: HCNC,CPTII,S$GLB, | Performed by: FAMILY MEDICINE

## 2020-03-09 PROCEDURE — 99499 RISK ADDL DX/OHS AUDIT: ICD-10-PCS | Mod: HCNC,S$GLB,, | Performed by: FAMILY MEDICINE

## 2020-03-09 PROCEDURE — 1101F PR PT FALLS ASSESS DOC 0-1 FALLS W/OUT INJ PAST YR: ICD-10-PCS | Mod: HCNC,CPTII,S$GLB, | Performed by: FAMILY MEDICINE

## 2020-03-09 PROCEDURE — 3074F SYST BP LT 130 MM HG: CPT | Mod: HCNC,CPTII,S$GLB, | Performed by: FAMILY MEDICINE

## 2020-03-09 PROCEDURE — 99214 PR OFFICE/OUTPT VISIT, EST, LEVL IV, 30-39 MIN: ICD-10-PCS | Mod: HCNC,S$GLB,, | Performed by: FAMILY MEDICINE

## 2020-03-09 PROCEDURE — 3078F PR MOST RECENT DIASTOLIC BLOOD PRESSURE < 80 MM HG: ICD-10-PCS | Mod: HCNC,CPTII,S$GLB, | Performed by: FAMILY MEDICINE

## 2020-03-09 PROCEDURE — 1159F PR MEDICATION LIST DOCUMENTED IN MEDICAL RECORD: ICD-10-PCS | Mod: HCNC,S$GLB,, | Performed by: FAMILY MEDICINE

## 2020-03-09 PROCEDURE — 99999 PR PBB SHADOW E&M-EST. PATIENT-LVL III: CPT | Mod: PBBFAC,HCNC,, | Performed by: FAMILY MEDICINE

## 2020-03-09 PROCEDURE — 1159F MED LIST DOCD IN RCRD: CPT | Mod: HCNC,S$GLB,, | Performed by: FAMILY MEDICINE

## 2020-03-09 PROCEDURE — 99214 OFFICE O/P EST MOD 30 MIN: CPT | Mod: HCNC,S$GLB,, | Performed by: FAMILY MEDICINE

## 2020-03-09 PROCEDURE — 99999 PR PBB SHADOW E&M-EST. PATIENT-LVL III: ICD-10-PCS | Mod: PBBFAC,HCNC,, | Performed by: FAMILY MEDICINE

## 2020-03-09 PROCEDURE — 99499 UNLISTED E&M SERVICE: CPT | Mod: HCNC,S$GLB,, | Performed by: FAMILY MEDICINE

## 2020-03-09 PROCEDURE — 1101F PT FALLS ASSESS-DOCD LE1/YR: CPT | Mod: HCNC,CPTII,S$GLB, | Performed by: FAMILY MEDICINE

## 2020-03-09 RX ORDER — NITROGLYCERIN 0.4 MG/1
TABLET SUBLINGUAL
Qty: 25 TABLET | Refills: 0 | Status: SHIPPED | OUTPATIENT
Start: 2020-03-09 | End: 2022-07-11

## 2020-03-09 RX ORDER — DOXYCYCLINE HYCLATE 100 MG
100 TABLET ORAL 2 TIMES DAILY
Qty: 14 TABLET | Refills: 0 | Status: SHIPPED | OUTPATIENT
Start: 2020-03-09 | End: 2020-03-16

## 2020-03-09 NOTE — PROGRESS NOTES
Subjective:       Patient ID: Ade Castellano is a 74 y.o. female.    Chief Complaint: Discuss Joint Cramps and Cough/ Chest Congestion    74 yea rol female presents with concerns about leg and foot cramps. She had labs done, which showed normal kidney functioning and normal liver enzymes. She is on crestor 10mg.    She has a recurrent cough that she she can't get rid of. She has had this for 4 weeks. It was productive yellow to clear. She has no post nasal drip. She is taking clarinex. Her cancer doctor ordered a chest xray that was normal. She is not taking any cough medication.     Past Medical History:   Diagnosis Date    Acute respiratory failure with hypoxia and hypercapnia 11/29/2017    Angina pectoris     Arthritis     Bell's palsy     left facial weakness    Breast cancer     RIGHT    CAD (coronary artery disease)     Cervical cancer     Chronic bronchitis     COPD (chronic obstructive pulmonary disease)     Dr. Katz    Dental bridge present     Emphysema of lung     H/O colonoscopy 06/2017    due for repeat colonsocopy in 6/2018    History of Bell's palsy     History of heart artery stent     Dr. Ortiz  x2 stents    Hyperlipidemia     Hypertension     Lung cancer     Myocardial infarction     SUNITHA (obstructive sleep apnea)     intolerant to mask    SUNITHA (obstructive sleep apnea)     intolerant to mask     Pneumonia     Pneumonia due to other staphylococcus     PUD (peptic ulcer disease)     Severe anemia 6/11/2018    Sleep apnea     Vaginal delivery     x1      Past Surgical History:   Procedure Laterality Date    ADENOIDECTOMY      BREAST BIOPSY Right     x3    BREAST LUMPECTOMY      BREAST SURGERY      lumpectomy right side     CERVIX SURGERY      cone    COLONOSCOPY N/A 3/17/2017    Procedure: COLONOSCOPY;  Surgeon: Julio Rudd MD;  Location: [facility];  Service: Endoscopy;  Laterality: N/A;    COLONOSCOPY N/A 6/30/2017    Procedure: COLONOSCOPY;  Surgeon: Julio Rudd  MD;  Location: [facility];  Service: Endoscopy;  Laterality: N/A;    COLONOSCOPY N/A 2018    Procedure: COLONOSCOPY;  Surgeon: Nura Urbina MD;  Location: [facility] END[facility];  Service: Endoscopy;  Laterality: N/A;    COLONOSCOPY N/A 2019    Procedure: COLONOSCOPY;  Surgeon: Emmanuel Perez MD;  Location: [facility] END[facility];  Service: Endoscopy;  Laterality: N/A;  confirmed appt-SP    EYE SURGERY Bilateral 2018    cataract     PORTACATH PLACEMENT Right 2018    sweat glands axillary regions Bilateral     TONSILLECTOMY       Family History   Problem Relation Age of Onset    Cancer Mother         breast    Heart disease Mother     Breast cancer Mother     Cancer Father         lung-smoker     Cancer Sister         lung-smoker     Heart attack Sister     Cancer Maternal Grandmother     Heart disease Maternal Grandmother     Cancer Maternal Grandfather     Heart disease Maternal Grandfather     Cancer Paternal Grandmother     Cancer Paternal Grandfather     Cancer Sister         mets not sure where it started     COPD Sister     Kidney cancer Son      Social History     Socioeconomic History    Marital status: Single     Spouse name: Not on file    Number of children: Not on file    Years of education: Not on file    Highest education level: Not on file   Occupational History    Not on file   Social Needs    Financial resource strain: Not on file    Food insecurity:     Worry: Not on file     Inability: Not on file    Transportation needs:     Medical: Not on file     Non-medical: Not on file   Tobacco Use    Smoking status: Former Smoker     Packs/day: 0.25     Years: 50.00     Pack years: 12.50     Types: Cigarettes     Last attempt to quit: 2017     Years since quittin.3    Smokeless tobacco: Never Used   Substance and Sexual Activity    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     Drug use: No    Sexual activity: Never   Lifestyle    Physical activity:     Days per  "week: Not on file     Minutes per session: Not on file    Stress: Not on file   Relationships    Social connections:     Talks on phone: Not on file     Gets together: Not on file     Attends [employer] service: Not on file     Active member of club or organization: Not on file     Attends meetings of clubs or organizations: Not on file     Relationship status: Not on file   Other Topics Concern    Not on file   Social History Narrative    Not on file       Review of Systems    Objective:       Vitals:    03/09/20 0851   BP: 110/70   Pulse: 60   Temp: 97.4 °F (36.3 °C)   TempSrc: Oral   SpO2: 96%   Weight: 83 kg (182 lb 15.7 oz)   Height: 5' 3" (1.6 m)       Physical Exam   Constitutional: She is oriented to person, place, and time. She appears well-developed and well-nourished. No distress.   HENT:   Head: Normocephalic and atraumatic.   Right Ear: External ear normal.   Left Ear: External ear normal.   Mouth/Throat: Oropharynx is clear and moist. No oropharyngeal exudate.   Neck: Normal range of motion. Neck supple.   Cardiovascular: Normal rate, regular rhythm and normal heart sounds. Exam reveals no gallop and no friction rub.   No murmur heard.  Pulmonary/Chest: Effort normal and breath sounds normal. No stridor. No respiratory distress. She has no wheezes. She has no rales. She exhibits no tenderness.   Lymphadenopathy:     She has no cervical adenopathy.   Neurological: She is alert and oriented to person, place, and time.   Skin: She is not diaphoretic.       Assessment:       1. Leg cramp    2. Atypical pneumonia        Plan:       Ade was seen today for discuss joint cramps and cough/ chest congestion.    Diagnoses and all orders for this visit:    Leg cramp  Advised to start co enzyme Q10 daily. Normal kidney functioning.     Atypical pneumonia  -     doxycycline (VIBRA-TABS) 100 MG tablet; Take 1 tablet (100 mg total) by mouth 2 (two) times daily. for 7 days         "

## 2020-04-01 ENCOUNTER — TELEPHONE (OUTPATIENT)
Dept: FAMILY MEDICINE | Facility: CLINIC | Age: 75
End: 2020-04-01

## 2020-04-01 NOTE — TELEPHONE ENCOUNTER
Spoke with pt and advise not to take old script for Augmentin; pt said ok. Pt said she's taking OTC meds for cou  ----- Message from Estela Casper sent at 4/1/2020  4:19 PM CDT -----  Contact: Self  Type: Patient Call Back    Who called: self  What is the request in detail:pt has an old script on hand for Augmentin 125mg. She would like to know if she can take this for her cough    Can the clinic reply by [facility]? no    Would the patient rather a call back or a response via [facility]? Call     Best call back number: [phone]

## 2020-04-05 NOTE — PROGRESS NOTES
Subjective:       Patient ID: Ade Castellano is a 74 y.o. female.    Chief Complaint: No chief complaint on file.      The patient location is: Home  The chief complaint leading to consultation is: follow-up Breast cancer  Visit type: Virtual visit with synchronous audio visit  Total time spent with patient: 15 min  Each patient to whom he or she provides medical services by telemedicine is:  (1) informed of the relationship between the physician and patient and the respective role of any other health care provider with respect to management of the patient; and (2) notified that he or she may decline to receive medical services by telemedicine and may withdraw from such care at any time.         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   Pathology showed a 1.15 cm, grade 3 infiltrating ductal carcinoma.  One sentinel lymph node was negative for malignancy.  Margins were negative and the closest margin was 1 cm.  She was staged a little pT1c little pN0 cM0 stage IA.  She declined adjuvant chemo therapy.  She completed post operative radiation therapy at 400 cGy per fraction to 4000 cGy 9/2014         Pt hospitalized 11/2017   Ms. Castellano was admitted for acute on chronic respiratory failure requiring intubation and ventilation. Has large lung mass in right lung with probable post obstructive pneumonia resulting in COPD exacerbation. But also, patient had ran out of home O2 prior to onset of symptoms, likely contributing to presentation. Initiated on broad spectrum abx, IV steroids, duo-nebs and pulmonology consulted for ventilator  co-management. Successfully extubated to BiPAP on 11/30. Then de-escalated to low flow nasal cannula. Abx tailored to augmentin for broad coverage, including anaerobic bacteria /     CTA chest 11/29/2017 revealed   Large right hilar mass with involvement of adjacent segmental pulmonary arterial branches and bronchi with associated postobstructive atelectasis and volume loss in the right middle lobe with adjacent ground glass opacity.  Findings highly concerning for underlying neoplastic process. Mediastinal and axillary adenopathy, with a right axillary lymph node measuring up to 2.0 cm in short axis diameter. No definite evidence of pulmonary thromboembolism.    She is followed by Pulmonology   She underwent rt axillary LN bx at outside facility- on 1/16/2018 at [facility]  Pathology revealed metastatic poorly differentiated carcinoma of unknown primary site  TTF-1 negative, napsin negative  , cytokeratin 7 positive, cytokeratin 20 negative, p63 negative, cytokeratin 5/6 focal dim positivity    She next underwent lung bx at [facility] 1/31/2018   Pathology revealed benign lung tissue    She underwent Repeat Right Lung Bx 2/14/2018 Pathology reveals Squamous cell carcinoma  PD-L1 10% low expression  EGFR NEG  ALK NEG  BRAF NEG      Outside slides axillary LN specimen  requested for review/comparison to lung bx findings     1) Right Lung Bx 2/14/2018  Supplemental Diagnosis  Immunohistochemical stains show strong nuclear staining for p63 in essentially all tumor cells and very strong  cytoplasmic and membrane staining for CK5/6, also in essentially all tumor cells. TTF-1 and CK7 are negative  within tumor cells but do stain native pulmonary elements present within the biopsy. A stain for mucicarmine is  negative. Positive and negative controls function appropriately.  Final diagnosis: Specimen submitted as right lung biopsy  -Squamous cell carcinoma  (Electronically Signed: 2018-02-20 09:12:07 )  Diagnosed  by: Phong Thompson  FINAL PATHOLOGIC DIAGNOSIS  Fragments of pulmonary parenchyma (submitted as right lung biopsy):    FINAL PATHOLOGIC DIAGNOSIS 1. Lymph node, right axilla, biopsy, review of 10 outside slides [facility], JS 18 [phone],  collected January 11, 2018: Metastatic poorly differentiated carcinoma (see comment).  The histologic section shows fibrous stroma infiltrated by nest of poorly differentiated malignant cells including  occasional dyskeratotic cells morphologically suggestive of metastatic squamous cell carcinoma.  Immunohistochemical stains are nonspecific; the cells are positive for cytokeratin 7 and cytokeratin 5/6 (focal) and  negative for cytokeratin 20, TTF-1, p63, GCDFP, mammoglobin, and CEA      PET/CT  4/19/2018 revealed there has been a excellent response to therapy.  At least 90% reduction in malignant activity.    She s/p  cycle 6 of carboplatin/Abraxane completed 7/2018        PET/CT 2/21/2019 increased size and irregularity of the right axillary lymph node with increased FDG avidity     MAMMO/US rt breast 2/2019 revealed suspicious findings rt axilla LN.  Biopsy of the lymph node measuring 1.4 x 1.1 x 1.3 recommended    Pathology 3/11/2019   FINAL PATHOLOGIC DIAGNOSIS  Right axilla, mass, core biopsy:  Positive for poorly differentiated carcinoma.  he staining profile of the current axillary mass match more closely with the previous  breast carcinoma (due to the p63 negativity in both the 2014 breast cancer and the current axillary mass lesion but  p63 positivity in the lung mass from 2018).  This staining profile, together with the comparison with the patient's prior breast tumor and prior lung tumor supports  a diagnosis of poorly differentiated carcinoma and the malignancy appears morphologically similar to the prior  malignancies from both sites, but the staining profile in this small sample from the axillary mass more closely  correlates with the prior breast  malignancy. Pathologic subclassification of this malignancy's primary location is not  definitive and clinical correlation is recommended definitively decide on possible primary location in this patient's  axillary mass sample.  Supplemental (2):  Additional immunohistochemical staining for progesterone receptor and HER2 are completed per clinician request  with following results:  Progesterone receptor: Negative; 0% nuclear tumor cell staining.  HER2: Negative; stain score = 0.  Supplemental (3):    Slides sent to [facility] for outside review  It was determined that agree with  interpretation of this case. In my opinion, the lung tumor corresponds to a squamous cell  carcinoma and appears to be unrelated to the invasive ductal carcinoma of the breast. The axillary mass, in my  opinion, corresponds to metastases of the breast primary. Although it is a poorly differentiated carcinoma, the  immunophenotype is most consistent with the one that the breast primary exhibited, and is inconsistent with a  metastatic squamous cell carcinoma. I      Further testing sent for cancer type ID also sent and results have revealed molecular diagnosis testing revealed head and neck salivary gland carcinoma probability 84% breast adenocarcinoma could not be excluded with a 12% probability      She is s/p AC q21d x 4 cycles completed 9/6/2019   PET/CT 9/19/2019 shows disease response with interval decrease in size and uptake of several right axillary lymph nodes and a supraclavicular lymph node.  Mild residual activity may indicate viable tumor.  No new hypermetabolic findings worrisome for malignancy.    Pt followed by Rad/Onc  She was presented at Multdisciplinary tumor board and it was determined to proceed with radiation only  She  completed  RT 12/16/2019   The right axilla and right supraclavicular region was treated at 200 cGy per fraction to 4400 cGy. The PET(+) disease in the right axilla and right supraclavicular fossa will  be boosted at 200 cGy per fraction to 6000 cGy total dose.      Doing well  No SOB/CP/cough  No fevers  She reports mild LOGAN improved   Appetite and weight stable  No hemoptysis   No bleeding-urinary/rectal/nasal        PET/CT 2/28/2020  1.  No evidence of hypermetabolic tumor.  Continued improvement of the right axilla status post adjuvant radiation.  2.  No new hypermetabolic lesions        PrevHx:She had an abnormal mammogram 6/4/2014 which  Revealed a round solid mass 6mm in rt breast.  She then underwent U/S guided core bx of rt breast mass on 6/17/2014.  Pathology revealed infiltrating ductal carcinoma Grade 3 with tumor  Present in thin-walled spaces suggestive of lymphatic spaces. Hormone  Receptor status on tumor specimen revealed ER negative 0% ,  SD negative 0% and Her 2 jose maria negative.  She subsequently underwent rt segmental mastectomy and SLN bx  On 7/11/2014. Pathology of rt breast lumpectomy revealed invasive  Ductal carcinoma with micropapillary pattern ( Invasive micropapillary  Ca) with max tumor dimension 11.5mm with suggestion of tumor in   Thin walled spaces c/w lymphovascular involvement. Surgical  Margins free of tumor, grade 3, ER/SD neg , Her 2 jose maria neg   With [place] Lymph node negative for Neoplasm.   Pathologic staging [id number](i-)  Her mother was diagnosed with breast cancer in her 50's/  She has no family history of ovarian cancer.           Review of Systems   Constitutional: Negative for appetite change, fever and unexpected weight change.   HENT: Negative for mouth sores and rhinorrhea.    Eyes: Negative for visual disturbance.   Respiratory: Positive for shortness of breath (improved). Negative for cough.    Cardiovascular: Negative for chest pain.   Gastrointestinal: Negative for abdominal pain and diarrhea.   Genitourinary: Negative for frequency.   Musculoskeletal: Positive for arthralgias. Negative for back pain.   Skin: Negative for rash.   Neurological: Negative for dizziness  and headaches.   Hematological: Negative for adenopathy.   Psychiatric/Behavioral: The patient is not nervous/anxious.        Objective:        PE deferred  Audio visit             CT a/p w/contrast 11/29/2018   1. No acute abnormality identified within the abdomen and pelvis.    2.  There are a few nonspecific prominent lymph nodes in the upper abdomen including a periesophageal lymph node which measures 1.0 cm in short axis diameter.    3.  Significant abdominal aortic atherosclerosis and abdominal aorta ectasia.    4.  Additional findings as above.    PET/CT 1/26/2018   1.  Intense FDG uptake within the known right infrahilar lung mass, compatible with malignancy.  It is unclear if this represents primary lung malignancy or metastatic breast cancer.    2.  Intensely hypermetabolic right axillary, retropectoral, and lower right cervical lymph nodes, compatible with metastases.    3.  Abnormal FDG uptake along right breast skin thickening, new from prior chest CTA and mammogram.  This may relate to localized edema/inflammation, though correlation with physical exam and mammography are recommended to exclude underlying inflammatory carcinoma.      MRI Brain w w/out contrast 2/9/2018  1.  No evidence of intracranial metastases.  2.  Sinus disease       Rt axillary LN bx at outside facility- on 1/16/2018 at [facility]  Pathology revealed metastatic poorly differentiated carcinoma of unknown primary  site 1/16/2018 at [facility]  TTF-1 negative, napsin negative  , cytokeratin 7 positive, cytokeratin 20 negative, p63 negative, cytokeratin 5/6 focal dim positivity    LUNG BIOPSY 1/31/2018  Pathology revealed benign lung tissue         SPECIMEN  1) Right Lung Bx 2/14/2018  Supplemental Diagnosis  Immunohistochemical stains show strong nuclear staining for p63 in essentially all tumor cells and very strong  cytoplasmic and membrane staining for CK5/6, also in essentially all tumor cells. TTF-1  and CK7 are negative  within tumor cells but do stain native pulmonary elements present within the biopsy. A stain for mucicarmine is  negative. Positive and negative controls function appropriately.  Final diagnosis: Specimen submitted as right lung biopsy  -Squamous cell carcinoma  (Electronically Signed: 2018-02-20 09:12:07 )  Diagnosed by: Phong Thompson  FINAL PATHOLOGIC DIAGNOSIS  Fragments of pulmonary parenchyma (submitted as right lung biopsy):    FINAL PATHOLOGIC DIAGNOSIS 1. Lymph node, right axilla, biopsy, review of 10 outside slides [facility], JS 18 [phone],  collected January 11, 2018: Metastatic poorly differentiated carcinoma (see comment).  The histologic section shows fibrous stroma infiltrated by nest of poorly differentiated malignant cells including  occasional dyskeratotic cells morphologically suggestive of metastatic squamous cell carcinoma.  Immunohistochemical stains are nonspecific; the cells are positive for cytokeratin 7 and cytokeratin 5/6 (focal) and  negative for cytokeratin 20, TTF-1, p63, GCDFP, mammoglobin, and CEA        PET/CT 2/21/2019  Increased size and irregularity of the right axillary lymph node with increased FDG avidity.  Although this would be atypical in location for lung carcinoma, the increased size and avidity must be concerning for metastatic disease in this patient with history of squamous cell lung cancer.     US Rt breast and mammo 2/26/2019  Impression:  Right  Lymph Node: Right axilla lymph node. Assessment: 4 - Suspicious finding. Biopsy is recommended.      BI-RADS Category:   Right: 4 - Suspicious  Overall: 4 - Suspicious     Recommendation:  Biopsy is recommended. Biopsy of the lymph node measuring 1.4 x 1.1 x 1.3 recommended , the one with the round shape configuration, corresponding to the most recent PET CT finding.         Pathology 3/11/2019   FINAL PATHOLOGIC DIAGNOSIS  Right axilla, mass, core biopsy:  Positive for poorly  differentiated carcinoma.  See comment.  Comment:  The biopsy from the right axilla mass shows a poorly differentiated carcinoma in background lymphoid tissue  with enlarged cells showing increased nuclear size, prominent nucleoli, moderate amounts of cytoplasm and mitotic  figures. Immunostains are completed and reveal the tumor cells to stain positively with cytokeratin AE 1/AE3, CK  5/6 and CK 7. The tumor cells are negative for CK 20, Estrogen receptor, p63 and TTF-1. All stains have  satisfactory positive and negative controls. The patient's prior cases will be reviewed and an additional stain for  JUAREZ-3 is pending in an attempt to pinpoint the primary site of this malignancy and results will follow in a  supplemental report.      Supplemental Diagnosis  3/11/2019  The current axillary mass is compared to the patient's prior right lung biopsy (case number [id number]) and the  current axillary mass is morphologically similar to the lung malignancy. Also, the current axillary mass is compared  to the patient's prior breast resection (Case number [id number]) and appears similar as well. P63 is negative in the  [id number] tumor and CK 5/6 shows scattered positive staining in the [id number] tumor.  Immunostain for JUAREZ-3 is completed and shows only rare weak to moderate staining within the tumor cell nuclei  with satisfactory positive and negative controls. This stain is positive in the surrounding lymphocytes. This staining  pattern is non-specific and does not definitively differentiate between a lung and breast primary malignancy in this  right axilla mass biopsy. The staining profile of the current axillary mass match more closely with the previous  breast carcinoma (due to the p63 negativity in both the 2014 breast cancer and the current axillary mass lesion but  p63 positivity in the lung mass from 2018).  This staining profile, together with the comparison with the patient's prior breast tumor and prior lung  tumor supports  a diagnosis of poorly differentiated carcinoma and the malignancy appears morphologically similar to the prior  malignancies from both sites, but the staining profile in this small sample from the axillary mass more closely  correlates with the prior breast malignancy. Pathologic subclassification of this malignancy's primary location is not  definitive and clinical correlation is recommended definitively decide on possible primary location in this patient's  axillary mass sample.  Supplemental (2):  Additional immunohistochemical staining for progesterone receptor and HER2 are completed per clinician request  with following results:  Progesterone receptor: Negative; 0% nuclear tumor cell staining.  HER2: Negative; stain score = 0.  Supplemental (3):  [place] DIAGNOSIS:  FINAL DIAGNOSIS  Breast, right, needle core biopsy ([id number]; 06/17/2014): Invasive ductal carcinoma, [place] grade III (of  III), with focal micropapillary features.  Immunohistochemical stains performed at the referring institution show that the tumor cells have the following  phenotype: - Estrogen receptor: Negative (0% tumor cells staining). - Progesterone receptor: Negative (0% tumor  cells staining). - HER2: Negative (score 0).  Lymph nodes, right, sentinel biopsy and lumpectomy ([id number]; 07/11/2014):  1. Right sentinel lymph node: A single (1) lymph node is negative for metastatic carcinoma.  Immunohistochemical stains performed by the referring institution and reviewed at [facility] for cytokeratin are  negative, confirming the diagnosis.  2. Right breast: Invasive ductal carcinoma with micropapillary features, per report 11.5 mm in greatest dimension.  Foci suspicious for lymphovascular invasion identified. Margins of resection are free of tumor.  Immunohistochemical stains performed by the referring institution and reviewed at [facility] show that the tumor  cells are negative for p63 and show patchy positivity for  CK 5/6.  Lung, right, needle core biopsy ([id number]; 02/14/2018): Squamous cell carcinoma.    Immunohistochemical stains performed by the referring institution show the tumor cells are strongly positive for p63  and CK 5/6, while negative for TTF-1 and CK7. These result support the diagnosis. Axilla, right, needle core biopsy  ([id number]; 03/11/2019): Lymph node positive for metastatic carcinoma, most consistent with breast primary  (see comment).  Immunohistochemical stains performed by the referring institution and reviewed at [facility] show that the tumor  cells are negative for p63, focally positive for CK 5/6, focally and weakly positive for JUAREZ-3, and strongly positive  for CK7. They are also negative for estrogen receptor, progesterone receptor, and HER2 JAM (score 0).  COMMENT:  Thank you for allowing me to review the case of this 73-year-old lady who recently underwent needle core biopsies  of a right axillary mass and who has a previous diagnosis of an invasive ductal carcinoma of the right breast, as well  as a squamous cell carcinoma of the lung. I am reviewing this case because of my special interest in breast  pathology.  I agree with your interpretation of this case. In my opinion, the lung tumor corresponds to a squamous cell  carcinoma and appears to be unrelated to the invasive ductal carcinoma of the breast. The axillary mass, in my  opinion, corresponds to metastases of the breast primary. Although it is a poorly differentiated carcinoma, the  immunophenotype is most consistent with the one that the breast primary exhibited, and is inconsistent with a  metastatic squamous cell carcinoma. I did share the case with Dr. Anabel Stone, one of our pulmonary pathologists,  and she concurs with this interpretation.  Note: Report attached.  Performing location:  [facility]  [address] [place] [address]    CT neck/chest/abd/pelvis w/contrast 4/26/2019   The  previously described right hilar mass is no longer visible.  There is persistent volume loss in the right middle lobe this appears similar to the prior PET-CT February 21, 2019.    Persistent abnormal right axillary node today measuring about 15 mm compared 18 mm prior.  The positioning of the adjacent nodes is slightly different likely accounting for the slight difference in measurement.    Cluster of tree-in-bud nodular densities left lower lobe series 2, image 93.  This may be related to small airways disease though serial follow-up suggested.      PET/CT 6/20/2019   New and worsening hypermetabolic lymph nodes concerning for metastatic breast cancer as described above.  Tissue sampling would be required for definitive diagnosis.      2-D echo 6/27/2019   · Normal left ventricular systolic function. The estimated ejection fraction is 55%  · Concentric left ventricular remodeling.  · Normal LV diastolic function.  · Normal right ventricular systolic function.  · Moderate left atrial enlargement.  · Mild right atrial enlargement.  · Mild aortic regurgitation.  · Mild mitral regurgitation.  · Mild tricuspid regurgitation.  · Normal central venous pressure (3 mm Hg).  · The estimated PA systolic pressure is 25 mm Hg      PET/CT 9/19/2019   Favorable response to therapy with interval decrease in size and uptake of several right axillary lymph nodes and a supraclavicular lymph node.  Mild residual activity may indicate viable tumor.    No new hypermetabolic findings worrisome for malignancy.    PET/CT 2/28/2020  1.  No evidence of hypermetabolic tumor.  Continued improvement of the right axilla status post adjuvant radiation.    2.  No new hypermetabolic lesions    Assessment:       1. Recurrent breast cancer, unspecified laterality    2.  History of Squamous cell carcinoma of right lung    3. Chronic obstructive pulmonary disease, unspecified COPD type        Plan:   ECOG 0  1-3    Pt with hx of Stage 1A invasive  ductal carcinoma rt breast s/p rt segmental mastectomy and SLN bx 7/11/2014 ER/[place] neg HER 2 jose maria neg [id number](i-).  S/p adjuvant RT completed 9/2014 Pt declined adjuvant chemo  Follow-up Mammo 6/12/2017 No mammographic evidence of malignancy.  Pt  hospitalized 11/2017 with acute-on-chronic  resp failure  Abnormal CT imaging revealing Large right hilar mass with involvement of  adjacent segmental pulmonary arterial branches and bronchi with associated postobstructive atelectasis  and involvement of mediastinal and axillary adenopathy   S/p rt axillary LN bx ( outside facility) - metastatic poorly differentiated carcinoma of unknown primary  site  status post  lung biopsy 1/31/2017 -benign lung tissue  PET/CT 1/26/2018   Intense FDG uptake within the known right infrahilar lung mass, compatible with malignancy.   Intensely hypermetabolic right axillary, retropectoral, and lower right cervical lymph nodes, compatible with metastases.  Abnormal FDG uptake along right breast skin thickening, new from prior chest CTA and mammogram.    Repeat lung bx 2/14/2018 revealed Squamous Cell CA lung  PD-L1 10% low expression  EGFR NEG  ALK NEG    Pt treated for advanced squamous cell CA of lung   She s/p  cycle 6 of carboplatin/Abraxane Day1 completed 7/2/2018 ( day 15 held due to prolonged cytopenias)  She completed therapy with near CR     PET/CT 2/21/2019 showed increased size and irregularity of the right axillary lymph node with increased FDG avidity    Recent MAMMO/US rt breast reveals suspicious findings rt axilla LN.  Biopsy of the lymph node measuring 1.4 x 1.1 x 1.3     Pt s/p  rt axillary LN bx  Pathology 3/11/2019   FINAL PATHOLOGIC DIAGNOSIS  Right axilla, mass, core biopsy:  Positive for poorly differentiated carcinoma.- likely breast primary   ERneg PRneg Her 2 neg    Outside review of specimen(s) revealed  lung tumor corresponds to a squamous cell carcinoma and appears to be unrelated to the invasive ductal carcinoma  of the breast. It was determined The axillary mass, in my opinion, corresponded  to metastases of the breast primary. Although it is a poorly differentiated carcinoma, theimmunophenotype is most consistent with the one that the breast primary exhibited, and is inconsistent with a metastatic squamous cell carcinoma.     CT imaging 4/26/2019 persistent rt axillary LAD, no other sites of disease     Pf followed by Rad/Onc regarding RT   It was determined to discuss potential surgical intervention involving alnd  Plan to discuss with breast surgeon , Dr. Alexandra potential surgical intervention       Lymph node biopsy 3/11/2019 Right axilla, mass, core biopsy:  Positive for poorly differentiated carcinoma.   favor breast primary   Pt was discussed at multidisciplinary tumor board  D/w Pathologist    PET/CT 6/20/2019  New and worsening hypermetabolic lymph nodes concerning for metastatic breast cancer     Previously Discussed  imaging findings in detail with patient which reveals new and worsening hypermetabolic melena metabolic lymph nodes including hypermetabolic supraclavicular node.  Therefore, at treatment option will be systemic chemotherapy in light of the recent imaging findings.  She will be followed by her surgical oncologist Dr. Christopher      IT was determined to proceed with systemic chemotherapy   She underwent systemic chemotherapy with AC  S/p  AC x48blre x 3 wks x 4 wks completed 9/6/2019   ( pt has not received in past)      PET/CT 9/19/2019 shows disease response with interval decrease in size and uptake of several right axillary lymph nodes and a supraclavicular lymph node.  Mild residual activity may indicate viable tumor.  No new hypermetabolic findings worrisome for malignancy.    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   She completed chemotherapy 9/6/2019  and has had a near complete response.  It  was determined  Radiation will be given to the original areas of hypermetabolic activity in the right axilla and right supraclavicular region  Pt was presented at multidisciplinary tumor board    Pt completed  RT 12/16/2019      PET/CT 2/28/2020  No evidence of hypermetabolic tumor.  Continued improvement of the right axilla status post adjuvant radiation.    No new hypermetabolic lesions     Doing well  Cont observation       Follow-up  1month with cbc,cmp prior to f/u     Advance Care Planning     Power of   I previously  initiated the process of advance care planning today and explained the importance of this process to the patient.  I introduced the concept of advance directives to the patient, as well. Then the patient received detailed information about the importance of designating a Health Care Power of  (HCPOA). She was also instructed to communicate with this person about their wishes for future healthcare, should she become sick and lose decision-making capacity. The patient has previously appointed a HCPOA. After our discussion, the patient has decided to complete a HCPOA and has appointed her son and niece and  I spent a total time of 16 minutes discussing this issue with the patient.         Cc: Swetha Katz M.D.         MD Tory Roberson MD Raymond Gould, MD

## 2020-04-06 ENCOUNTER — OFFICE VISIT (OUTPATIENT)
Dept: HEMATOLOGY/ONCOLOGY | Facility: CLINIC | Age: 75
End: 2020-04-06
Payer: MEDICARE

## 2020-04-06 DIAGNOSIS — J44.9 CHRONIC OBSTRUCTIVE PULMONARY DISEASE, UNSPECIFIED COPD TYPE: ICD-10-CM

## 2020-04-06 DIAGNOSIS — C34.91 SQUAMOUS CELL CARCINOMA OF RIGHT LUNG: ICD-10-CM

## 2020-04-06 DIAGNOSIS — C50.919 RECURRENT BREAST CANCER, UNSPECIFIED LATERALITY: Primary | ICD-10-CM

## 2020-04-06 PROCEDURE — 1101F PT FALLS ASSESS-DOCD LE1/YR: CPT | Mod: HCNC,CPTII,S$GLB, | Performed by: INTERNAL MEDICINE

## 2020-04-06 PROCEDURE — 1101F PR PT FALLS ASSESS DOC 0-1 FALLS W/OUT INJ PAST YR: ICD-10-PCS | Mod: HCNC,CPTII,S$GLB, | Performed by: INTERNAL MEDICINE

## 2020-04-06 PROCEDURE — 99214 PR OFFICE/OUTPT VISIT, EST, LEVL IV, 30-39 MIN: ICD-10-PCS | Mod: HCNC,S$GLB,, | Performed by: INTERNAL MEDICINE

## 2020-04-06 PROCEDURE — 1159F MED LIST DOCD IN RCRD: CPT | Mod: HCNC,S$GLB,, | Performed by: INTERNAL MEDICINE

## 2020-04-06 PROCEDURE — 99214 OFFICE O/P EST MOD 30 MIN: CPT | Mod: HCNC,S$GLB,, | Performed by: INTERNAL MEDICINE

## 2020-04-06 PROCEDURE — 1159F PR MEDICATION LIST DOCUMENTED IN MEDICAL RECORD: ICD-10-PCS | Mod: HCNC,S$GLB,, | Performed by: INTERNAL MEDICINE

## 2020-06-01 ENCOUNTER — TELEPHONE (OUTPATIENT)
Dept: HEMATOLOGY/ONCOLOGY | Facility: CLINIC | Age: 75
End: 2020-06-01

## 2020-06-01 NOTE — TELEPHONE ENCOUNTER
Patient called on Friday and stated she has had a cough since February. She states she is having a lot of pain down her arm/collarbone and her legs. She states she is also having some leg swelling. Patient states she had to take her left over pain medicine from when she had her surgery with little relief.

## 2020-06-11 ENCOUNTER — OFFICE VISIT (OUTPATIENT)
Dept: FAMILY MEDICINE | Facility: CLINIC | Age: 75
End: 2020-06-11
Payer: MEDICARE

## 2020-06-11 ENCOUNTER — TELEPHONE (OUTPATIENT)
Dept: FAMILY MEDICINE | Facility: CLINIC | Age: 75
End: 2020-06-11

## 2020-06-11 VITALS
SYSTOLIC BLOOD PRESSURE: 134 MMHG | BODY MASS INDEX: 32.27 KG/M2 | OXYGEN SATURATION: 92 % | TEMPERATURE: 98 F | DIASTOLIC BLOOD PRESSURE: 82 MMHG | HEART RATE: 56 BPM | HEIGHT: 63 IN | WEIGHT: 182.13 LBS

## 2020-06-11 DIAGNOSIS — C34.90 SQUAMOUS CELL CARCINOMA OF LUNG, UNSPECIFIED LATERALITY: Chronic | ICD-10-CM

## 2020-06-11 DIAGNOSIS — J44.1 CHRONIC OBSTRUCTIVE PULMONARY DISEASE WITH ACUTE EXACERBATION: ICD-10-CM

## 2020-06-11 DIAGNOSIS — R05.3 CHRONIC COUGH: Primary | ICD-10-CM

## 2020-06-11 DIAGNOSIS — I70.0 AORTIC ARCH ATHEROSCLEROSIS: Primary | ICD-10-CM

## 2020-06-11 LAB — SARS-COV-2 RNA RESP QL NAA+PROBE: NOT DETECTED

## 2020-06-11 PROCEDURE — 99499 RISK ADDL DX/OHS AUDIT: ICD-10-PCS | Mod: HCNC,S$GLB,, | Performed by: INTERNAL MEDICINE

## 2020-06-11 PROCEDURE — 99214 PR OFFICE/OUTPT VISIT, EST, LEVL IV, 30-39 MIN: ICD-10-PCS | Mod: HCNC,S$GLB,, | Performed by: INTERNAL MEDICINE

## 2020-06-11 PROCEDURE — 99999 PR PBB SHADOW E&M-EST. PATIENT-LVL III: CPT | Mod: PBBFAC,HCNC,, | Performed by: INTERNAL MEDICINE

## 2020-06-11 PROCEDURE — 3079F DIAST BP 80-89 MM HG: CPT | Mod: HCNC,CPTII,S$GLB, | Performed by: INTERNAL MEDICINE

## 2020-06-11 PROCEDURE — 1125F AMNT PAIN NOTED PAIN PRSNT: CPT | Mod: HCNC,S$GLB,, | Performed by: INTERNAL MEDICINE

## 2020-06-11 PROCEDURE — 1101F PR PT FALLS ASSESS DOC 0-1 FALLS W/OUT INJ PAST YR: ICD-10-PCS | Mod: HCNC,CPTII,S$GLB, | Performed by: INTERNAL MEDICINE

## 2020-06-11 PROCEDURE — 1159F MED LIST DOCD IN RCRD: CPT | Mod: HCNC,S$GLB,, | Performed by: INTERNAL MEDICINE

## 2020-06-11 PROCEDURE — 99999 PR PBB SHADOW E&M-EST. PATIENT-LVL III: ICD-10-PCS | Mod: PBBFAC,HCNC,, | Performed by: INTERNAL MEDICINE

## 2020-06-11 PROCEDURE — 99214 OFFICE O/P EST MOD 30 MIN: CPT | Mod: HCNC,S$GLB,, | Performed by: INTERNAL MEDICINE

## 2020-06-11 PROCEDURE — 3075F SYST BP GE 130 - 139MM HG: CPT | Mod: HCNC,CPTII,S$GLB, | Performed by: INTERNAL MEDICINE

## 2020-06-11 PROCEDURE — 1125F PR PAIN SEVERITY QUANTIFIED, PAIN PRESENT: ICD-10-PCS | Mod: HCNC,S$GLB,, | Performed by: INTERNAL MEDICINE

## 2020-06-11 PROCEDURE — 3079F PR MOST RECENT DIASTOLIC BLOOD PRESSURE 80-89 MM HG: ICD-10-PCS | Mod: HCNC,CPTII,S$GLB, | Performed by: INTERNAL MEDICINE

## 2020-06-11 PROCEDURE — 1101F PT FALLS ASSESS-DOCD LE1/YR: CPT | Mod: HCNC,CPTII,S$GLB, | Performed by: INTERNAL MEDICINE

## 2020-06-11 PROCEDURE — U0003 INFECTIOUS AGENT DETECTION BY NUCLEIC ACID (DNA OR RNA); SEVERE ACUTE RESPIRATORY SYNDROME CORONAVIRUS 2 (SARS-COV-2) (CORONAVIRUS DISEASE [COVID-19]), AMPLIFIED PROBE TECHNIQUE, MAKING USE OF HIGH THROUGHPUT TECHNOLOGIES AS DESCRIBED BY CMS-2020-01-R: HCPCS | Mod: HCNC

## 2020-06-11 PROCEDURE — 3075F PR MOST RECENT SYSTOLIC BLOOD PRESS GE 130-139MM HG: ICD-10-PCS | Mod: HCNC,CPTII,S$GLB, | Performed by: INTERNAL MEDICINE

## 2020-06-11 PROCEDURE — 1159F PR MEDICATION LIST DOCUMENTED IN MEDICAL RECORD: ICD-10-PCS | Mod: HCNC,S$GLB,, | Performed by: INTERNAL MEDICINE

## 2020-06-11 PROCEDURE — 99499 UNLISTED E&M SERVICE: CPT | Mod: HCNC,S$GLB,, | Performed by: INTERNAL MEDICINE

## 2020-06-11 RX ORDER — LEVOFLOXACIN 500 MG/1
500 TABLET, FILM COATED ORAL DAILY
Qty: 7 TABLET | Refills: 0 | Status: SHIPPED | OUTPATIENT
Start: 2020-06-11 | End: 2020-07-21

## 2020-06-11 RX ORDER — PROMETHAZINE HYDROCHLORIDE AND DEXTROMETHORPHAN HYDROBROMIDE 6.25; 15 MG/5ML; MG/5ML
5 SYRUP ORAL 2 TIMES DAILY PRN
Qty: 120 ML | Refills: 0 | Status: SHIPPED | OUTPATIENT
Start: 2020-06-11 | End: 2020-06-21

## 2020-06-11 RX ORDER — PREDNISONE 50 MG/1
50 TABLET ORAL DAILY
Qty: 5 TABLET | Refills: 0 | Status: SHIPPED | OUTPATIENT
Start: 2020-06-11 | End: 2020-06-16

## 2020-06-12 ENCOUNTER — TELEPHONE (OUTPATIENT)
Dept: FAMILY MEDICINE | Facility: CLINIC | Age: 75
End: 2020-06-12

## 2020-06-15 NOTE — TELEPHONE ENCOUNTER
Patient advised of lab results and x-ray results, per letters, per Dr. Conrad.  Patient verbalized understanding.

## 2020-06-16 ENCOUNTER — OFFICE VISIT (OUTPATIENT)
Dept: HEMATOLOGY/ONCOLOGY | Facility: CLINIC | Age: 75
End: 2020-06-16
Payer: MEDICARE

## 2020-06-16 ENCOUNTER — INFUSION (OUTPATIENT)
Dept: INFUSION THERAPY | Facility: HOSPITAL | Age: 75
End: 2020-06-16
Attending: INTERNAL MEDICINE
Payer: MEDICARE

## 2020-06-16 ENCOUNTER — LAB VISIT (OUTPATIENT)
Dept: LAB | Facility: HOSPITAL | Age: 75
End: 2020-06-16
Attending: INTERNAL MEDICINE
Payer: MEDICARE

## 2020-06-16 VITALS
DIASTOLIC BLOOD PRESSURE: 78 MMHG | RESPIRATION RATE: 17 BRPM | SYSTOLIC BLOOD PRESSURE: 143 MMHG | OXYGEN SATURATION: 96 % | HEART RATE: 52 BPM | TEMPERATURE: 98 F

## 2020-06-16 VITALS
TEMPERATURE: 98 F | DIASTOLIC BLOOD PRESSURE: 80 MMHG | SYSTOLIC BLOOD PRESSURE: 141 MMHG | HEIGHT: 63 IN | OXYGEN SATURATION: 96 % | WEIGHT: 180.31 LBS | HEART RATE: 57 BPM | BODY MASS INDEX: 31.95 KG/M2

## 2020-06-16 DIAGNOSIS — C34.91 SQUAMOUS CELL CARCINOMA OF RIGHT LUNG: ICD-10-CM

## 2020-06-16 DIAGNOSIS — C50.919 RECURRENT BREAST CANCER, UNSPECIFIED LATERALITY: Primary | ICD-10-CM

## 2020-06-16 DIAGNOSIS — Z12.31 ENCOUNTER FOR SCREENING MAMMOGRAM FOR MALIGNANT NEOPLASM OF BREAST: ICD-10-CM

## 2020-06-16 DIAGNOSIS — C34.90 SQUAMOUS CELL CARCINOMA OF LUNG, UNSPECIFIED LATERALITY: ICD-10-CM

## 2020-06-16 DIAGNOSIS — C34.90 SQUAMOUS CELL CARCINOMA LUNG: Primary | ICD-10-CM

## 2020-06-16 LAB
ALBUMIN SERPL BCP-MCNC: 4 G/DL (ref 3.5–5.2)
ALP SERPL-CCNC: 41 U/L (ref 55–135)
ALT SERPL W/O P-5'-P-CCNC: 24 U/L (ref 10–44)
ANION GAP SERPL CALC-SCNC: 9 MMOL/L (ref 8–16)
AST SERPL-CCNC: 18 U/L (ref 10–40)
BILIRUB SERPL-MCNC: 0.4 MG/DL (ref 0.1–1)
BUN SERPL-MCNC: 19 MG/DL (ref 8–23)
CALCIUM SERPL-MCNC: 9.2 MG/DL (ref 8.7–10.5)
CHLORIDE SERPL-SCNC: 101 MMOL/L (ref 95–110)
CO2 SERPL-SCNC: 29 MMOL/L (ref 23–29)
CREAT SERPL-MCNC: 1.1 MG/DL (ref 0.5–1.4)
ERYTHROCYTE [DISTWIDTH] IN BLOOD BY AUTOMATED COUNT: 13.8 % (ref 11.5–14.5)
EST. GFR  (AFRICAN AMERICAN): 58 ML/MIN/1.73 M^2
EST. GFR  (NON AFRICAN AMERICAN): 50 ML/MIN/1.73 M^2
GLUCOSE SERPL-MCNC: 120 MG/DL (ref 70–110)
HCT VFR BLD AUTO: 42.8 % (ref 37–48.5)
HGB BLD-MCNC: 13.9 G/DL (ref 12–16)
IMM GRANULOCYTES # BLD AUTO: 0.1 K/UL (ref 0–0.04)
MCH RBC QN AUTO: 30.9 PG (ref 27–31)
MCHC RBC AUTO-ENTMCNC: 32.5 G/DL (ref 32–36)
MCV RBC AUTO: 95 FL (ref 82–98)
NEUTROPHILS # BLD AUTO: 4.5 K/UL (ref 1.8–7.7)
PLATELET # BLD AUTO: 217 K/UL (ref 150–350)
PMV BLD AUTO: 9 FL (ref 9.2–12.9)
POTASSIUM SERPL-SCNC: 3.6 MMOL/L (ref 3.5–5.1)
PROT SERPL-MCNC: 6.6 G/DL (ref 6–8.4)
RBC # BLD AUTO: 4.5 M/UL (ref 4–5.4)
SODIUM SERPL-SCNC: 139 MMOL/L (ref 136–145)
WBC # BLD AUTO: 6.85 K/UL (ref 3.9–12.7)

## 2020-06-16 PROCEDURE — 3077F SYST BP >= 140 MM HG: CPT | Mod: HCNC,CPTII,S$GLB, | Performed by: INTERNAL MEDICINE

## 2020-06-16 PROCEDURE — 1101F PT FALLS ASSESS-DOCD LE1/YR: CPT | Mod: HCNC,CPTII,S$GLB, | Performed by: INTERNAL MEDICINE

## 2020-06-16 PROCEDURE — 99999 PR PBB SHADOW E&M-EST. PATIENT-LVL IV: ICD-10-PCS | Mod: PBBFAC,HCNC,, | Performed by: INTERNAL MEDICINE

## 2020-06-16 PROCEDURE — 96523 IRRIG DRUG DELIVERY DEVICE: CPT | Mod: HCNC

## 2020-06-16 PROCEDURE — 25000003 PHARM REV CODE 250: Mod: HCNC | Performed by: INTERNAL MEDICINE

## 2020-06-16 PROCEDURE — 1159F MED LIST DOCD IN RCRD: CPT | Mod: HCNC,S$GLB,, | Performed by: INTERNAL MEDICINE

## 2020-06-16 PROCEDURE — 99214 PR OFFICE/OUTPT VISIT, EST, LEVL IV, 30-39 MIN: ICD-10-PCS | Mod: HCNC,S$GLB,, | Performed by: INTERNAL MEDICINE

## 2020-06-16 PROCEDURE — 1126F AMNT PAIN NOTED NONE PRSNT: CPT | Mod: HCNC,S$GLB,, | Performed by: INTERNAL MEDICINE

## 2020-06-16 PROCEDURE — 99999 PR PBB SHADOW E&M-EST. PATIENT-LVL IV: CPT | Mod: PBBFAC,HCNC,, | Performed by: INTERNAL MEDICINE

## 2020-06-16 PROCEDURE — 85027 COMPLETE CBC AUTOMATED: CPT | Mod: HCNC

## 2020-06-16 PROCEDURE — 99214 OFFICE O/P EST MOD 30 MIN: CPT | Mod: HCNC,S$GLB,, | Performed by: INTERNAL MEDICINE

## 2020-06-16 PROCEDURE — 1101F PR PT FALLS ASSESS DOC 0-1 FALLS W/OUT INJ PAST YR: ICD-10-PCS | Mod: HCNC,CPTII,S$GLB, | Performed by: INTERNAL MEDICINE

## 2020-06-16 PROCEDURE — A4216 STERILE WATER/SALINE, 10 ML: HCPCS | Mod: HCNC | Performed by: INTERNAL MEDICINE

## 2020-06-16 PROCEDURE — 1159F PR MEDICATION LIST DOCUMENTED IN MEDICAL RECORD: ICD-10-PCS | Mod: HCNC,S$GLB,, | Performed by: INTERNAL MEDICINE

## 2020-06-16 PROCEDURE — 80053 COMPREHEN METABOLIC PANEL: CPT | Mod: HCNC

## 2020-06-16 PROCEDURE — 1126F PR PAIN SEVERITY QUANTIFIED, NO PAIN PRESENT: ICD-10-PCS | Mod: HCNC,S$GLB,, | Performed by: INTERNAL MEDICINE

## 2020-06-16 PROCEDURE — 3079F PR MOST RECENT DIASTOLIC BLOOD PRESSURE 80-89 MM HG: ICD-10-PCS | Mod: HCNC,CPTII,S$GLB, | Performed by: INTERNAL MEDICINE

## 2020-06-16 PROCEDURE — 3077F PR MOST RECENT SYSTOLIC BLOOD PRESSURE >= 140 MM HG: ICD-10-PCS | Mod: HCNC,CPTII,S$GLB, | Performed by: INTERNAL MEDICINE

## 2020-06-16 PROCEDURE — 36415 COLL VENOUS BLD VENIPUNCTURE: CPT | Mod: HCNC

## 2020-06-16 PROCEDURE — 3079F DIAST BP 80-89 MM HG: CPT | Mod: HCNC,CPTII,S$GLB, | Performed by: INTERNAL MEDICINE

## 2020-06-16 PROCEDURE — 63600175 PHARM REV CODE 636 W HCPCS: Mod: HCNC | Performed by: INTERNAL MEDICINE

## 2020-06-16 RX ORDER — SODIUM CHLORIDE 0.9 % (FLUSH) 0.9 %
10 SYRINGE (ML) INJECTION
Status: DISCONTINUED | OUTPATIENT
Start: 2020-06-16 | End: 2020-06-16 | Stop reason: HOSPADM

## 2020-06-16 RX ORDER — HEPARIN 100 UNIT/ML
500 SYRINGE INTRAVENOUS
Status: DISCONTINUED | OUTPATIENT
Start: 2020-06-16 | End: 2020-06-16 | Stop reason: HOSPADM

## 2020-06-16 RX ADMIN — HEPARIN 500 UNITS: 100 SYRINGE at 10:06

## 2020-06-16 RX ADMIN — Medication 10 ML: at 10:06

## 2020-06-16 NOTE — PROGRESS NOTES
Subjective:       Patient ID: Ade Castellano is a 74 y.o. female.    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   Pathology showed a 1.15 cm, grade 3 infiltrating ductal carcinoma.  One sentinel lymph node was negative for malignancy.  Margins were negative and the closest margin was 1 cm.  She was staged a little pT1c little pN0 cM0 stage IA.  She declined adjuvant chemo therapy.  She completed post operative radiation therapy at 400 cGy per fraction to 4000 cGy 9/2014         Pt hospitalized 11/2017   Ms. Castellano was admitted for acute on chronic respiratory failure requiring intubation and ventilation. Has large lung mass in right lung with probable post obstructive pneumonia resulting in COPD exacerbation. But also, patient had ran out of home O2 prior to onset of symptoms, likely contributing to presentation. Initiated on broad spectrum abx, IV steroids, duo-nebs and pulmonology consulted for ventilator co-management. Successfully extubated to BiPAP on 11/30. Then de-escalated to low flow nasal cannula. Abx tailored to augmentin for broad coverage, including anaerobic bacteria /     CTA chest 11/29/2017 revealed   Large right hilar mass with involvement of adjacent segmental pulmonary arterial branches and bronchi with associated postobstructive atelectasis and volume loss in the right middle lobe with adjacent ground glass opacity.  Findings highly concerning for underlying neoplastic process. Mediastinal and axillary adenopathy, with a right axillary lymph node measuring up to 2.0 cm in short  axis diameter. No definite evidence of pulmonary thromboembolism.    She is followed by Pulmonology   She underwent rt axillary LN bx at outside facility- on 1/16/2018 at [facility]  Pathology revealed metastatic poorly differentiated carcinoma of unknown primary site  TTF-1 negative, napsin negative  , cytokeratin 7 positive, cytokeratin 20 negative, p63 negative, cytokeratin 5/6 focal dim positivity    She next underwent lung bx at [facility] 1/31/2018   Pathology revealed benign lung tissue    She underwent Repeat Right Lung Bx 2/14/2018 Pathology reveals Squamous cell carcinoma  PD-L1 10% low expression  EGFR NEG  ALK NEG  BRAF NEG      Outside slides axillary LN specimen  requested for review/comparison to lung bx findings     1) Right Lung Bx 2/14/2018  Supplemental Diagnosis  Immunohistochemical stains show strong nuclear staining for p63 in essentially all tumor cells and very strong  cytoplasmic and membrane staining for CK5/6, also in essentially all tumor cells. TTF-1 and CK7 are negative  within tumor cells but do stain native pulmonary elements present within the biopsy. A stain for mucicarmine is  negative. Positive and negative controls function appropriately.  Final diagnosis: Specimen submitted as right lung biopsy  -Squamous cell carcinoma  (Electronically Signed: 2018-02-20 09:12:07 )  Diagnosed by: Phong Thompson  FINAL PATHOLOGIC DIAGNOSIS  Fragments of pulmonary parenchyma (submitted as right lung biopsy):    FINAL PATHOLOGIC DIAGNOSIS 1. Lymph node, right axilla, biopsy, review of 10 outside slides [facility], JS 18 1 [id number],  collected January 11, 2018: Metastatic poorly differentiated carcinoma (see comment).  The histologic section shows fibrous stroma infiltrated by nest of poorly differentiated malignant cells including  occasional dyskeratotic cells morphologically suggestive of metastatic squamous cell carcinoma.  Immunohistochemical stains are nonspecific; the  cells are positive for cytokeratin 7 and cytokeratin 5/6 (focal) and  negative for cytokeratin 20, TTF-1, p63, GCDFP, mammoglobin, and CEA      PET/CT  4/19/2018 revealed there has been a excellent response to therapy.  At least 90% reduction in malignant activity.    She s/p  cycle 6 of carboplatin/Abraxane completed 7/2018        PET/CT 2/21/2019 increased size and irregularity of the right axillary lymph node with increased FDG avidity     MAMMO/US rt breast 2/2019 revealed suspicious findings rt axilla LN.  Biopsy of the lymph node measuring 1.4 x 1.1 x 1.3 recommended    Pathology 3/11/2019   FINAL PATHOLOGIC DIAGNOSIS  Right axilla, mass, core biopsy:  Positive for poorly differentiated carcinoma.  he staining profile of the current axillary mass match more closely with the previous  breast carcinoma (due to the p63 negativity in both the 2014 breast cancer and the current axillary mass lesion but  p63 positivity in the lung mass from 2018).  This staining profile, together with the comparison with the patient's prior breast tumor and prior lung tumor supports  a diagnosis of poorly differentiated carcinoma and the malignancy appears morphologically similar to the prior  malignancies from both sites, but the staining profile in this small sample from the axillary mass more closely  correlates with the prior breast malignancy. Pathologic subclassification of this malignancy's primary location is not  definitive and clinical correlation is recommended definitively decide on possible primary location in this patient's  axillary mass sample.  Supplemental (2):  Additional immunohistochemical staining for progesterone receptor and HER2 are completed per clinician request  with following results:  Progesterone receptor: Negative; 0% nuclear tumor cell staining.  HER2: Negative; stain score = 0.  Supplemental (3):    Slides sent to [facility] for outside review  It was determined that agree with  interpretation of  this case. In my opinion, the lung tumor corresponds to a squamous cell  carcinoma and appears to be unrelated to the invasive ductal carcinoma of the breast. The axillary mass, in my  opinion, corresponds to metastases of the breast primary. Although it is a poorly differentiated carcinoma, the  immunophenotype is most consistent with the one that the breast primary exhibited, and is inconsistent with a  metastatic squamous cell carcinoma. I      Further testing sent for cancer type ID also sent and results have revealed molecular diagnosis testing revealed head and neck salivary gland carcinoma probability 84% breast adenocarcinoma could not be excluded with a 12% probability      She is s/p AC q21d x 4 cycles completed 9/6/2019   PET/CT 9/19/2019 shows disease response with interval decrease in size and uptake of several right axillary lymph nodes and a supraclavicular lymph node.  Mild residual activity may indicate viable tumor.  No new hypermetabolic findings worrisome for malignancy.    Pt followed by Rad/Onc  She was presented at [facility] tumor board and it was determined to proceed with radiation only  She  completed  RT 12/16/2019   The right axilla and right supraclavicular region was treated at 200 cGy per fraction to 4400 cGy. The PET(+) disease in the right axilla and right supraclavicular fossa will be boosted at 200 cGy per fraction to 6000 cGy total dose.    Interval Hx 6/16/2020   She continues with LOGAN  She was prescribed Levaquin and steroids per PCP for COPD exacerbation  She reports cough has improved  She is followed by Dr. Katz, pulmonology  Further w/u planned including 2-D echo, CTA chest   No swelling or pain RUE  Appetite and weight stable  No hemoptysis   No bleeding-urinary/rectal/nasal       PET/CT 2/28/2020  1.  No evidence of hypermetabolic tumor.  Continued improvement of the right axilla status post adjuvant radiation.  2.  No new hypermetabolic lesions        PrevHx:She  "had an abnormal mammogram 6/4/2014 which  Revealed a round solid mass 6mm in rt breast.  She then underwent U/S guided core bx of rt breast mass on 6/17/2014.  Pathology revealed infiltrating ductal carcinoma Grade 3 with tumor  Present in thin-walled spaces suggestive of lymphatic spaces. Hormone  Receptor status on tumor specimen revealed ER negative 0% ,  IN negative 0% and Her 2 jose maria negative.  She subsequently underwent rt segmental mastectomy and SLN bx  On 7/11/2014. Pathology of rt breast lumpectomy revealed invasive  Ductal carcinoma with micropapillary pattern ( Invasive micropapillary  Ca) with max tumor dimension 11.5mm with suggestion of tumor in   Thin walled spaces c/w lymphovascular involvement. Surgical  Margins free of tumor, grade 3, ER/IN neg , Her 2 jose maria neg   With [place] Lymph node negative for Neoplasm.   Pathologic staging [id number](i-)  Her mother was diagnosed with breast cancer in her 50's/  She has no family history of ovarian cancer.           Review of Systems   Constitutional: Negative for appetite change, fever and unexpected weight change.   HENT: Negative for mouth sores and rhinorrhea.    Eyes: Negative for visual disturbance.   Respiratory: Positive for shortness of breath (improved). Negative for cough.    Cardiovascular: Negative for chest pain.   Gastrointestinal: Negative for abdominal pain and diarrhea.   Genitourinary: Negative for frequency.   Musculoskeletal: Positive for arthralgias. Negative for back pain.   Skin: Negative for rash.   Neurological: Negative for dizziness and headaches.   Hematological: Negative for adenopathy.   Psychiatric/Behavioral: The patient is not nervous/anxious.        Objective:          Vitals:    06/16/20 1110   BP: (!) 141/80   BP Location: Left arm   Patient Position: Sitting   BP Method: Medium (Automatic)   Pulse: (!) 57   Temp: 98.1 °F (36.7 °C)   TempSrc: Oral   SpO2: 96%   Weight: 81.8 kg (180 lb 5.4 oz)   Height: 5' 3" (1.6 m) "       Physical Exam   Constitutional: She is oriented to person, place, and time. She appears well-developed and well-nourished.   HENT:   Head: Normocephalic.   Mouth/Throat: Oropharynx is clear and moist. No oropharyngeal exudate.   Eyes: Conjunctivae and lids are normal. Pupils are equal, round, and reactive to light. No scleral icterus.   Neck: Normal range of motion. Neck supple. No thyromegaly present.   Cardiovascular: Normal rate, regular rhythm and normal heart sounds.    No murmur heard.  Pulmonary/Chest: Breath sounds normal. She has no wheezes. She has no rales.   Abdominal: Soft. Bowel sounds are normal. She exhibits no distension and no mass. There is no hepatosplenomegaly. There is no tenderness. There is no rebound and no guarding.   Musculoskeletal: Normal range of motion. She exhibits no edema and no tenderness.   Lymphadenopathy:     She has no cervical adenopathy.     She has no axillary adenopathy.        Right: No supraclavicular adenopathy present.        Left: No supraclavicular adenopathy present.   Neurological: She is alert and oriented to person, place, and time. No cranial nerve deficit. Coordination normal.   Skin: Skin is warm and dry. No ecchymosis, no petechiae and no rash noted. No erythema.   Psychiatric: She has a normal mood and affect.             CT a/p w/contrast 11/29/2018   1. No acute abnormality identified within the abdomen and pelvis.    2.  There are a few nonspecific prominent lymph nodes in the upper abdomen including a periesophageal lymph node which measures 1.0 cm in short axis diameter.    3.  Significant abdominal aortic atherosclerosis and abdominal aorta ectasia.    4.  Additional findings as above.    PET/CT 1/26/2018   1.  Intense FDG uptake within the known right infrahilar lung mass, compatible with malignancy.  It is unclear if this represents primary lung malignancy or metastatic breast cancer.    2.  Intensely hypermetabolic right axillary,  retropectoral, and lower right cervical lymph nodes, compatible with metastases.    3.  Abnormal FDG uptake along right breast skin thickening, new from prior chest CTA and mammogram.  This may relate to localized edema/inflammation, though correlation with physical exam and mammography are recommended to exclude underlying inflammatory carcinoma.      MRI Brain w w/out contrast 2/9/2018  1.  No evidence of intracranial metastases.  2.  Sinus disease       Rt axillary LN bx at outside facility- on 1/16/2018 at [facility]  Pathology revealed metastatic poorly differentiated carcinoma of unknown primary  site 1/16/2018 at [facility]  TTF-1 negative, napsin negative  , cytokeratin 7 positive, cytokeratin 20 negative, p63 negative, cytokeratin 5/6 focal dim positivity    LUNG BIOPSY 1/31/2018  Pathology revealed benign lung tissue         SPECIMEN  1) Right Lung Bx 2/14/2018  Supplemental Diagnosis  Immunohistochemical stains show strong nuclear staining for p63 in essentially all tumor cells and very strong  cytoplasmic and membrane staining for CK5/6, also in essentially all tumor cells. TTF-1 and CK7 are negative  within tumor cells but do stain native pulmonary elements present within the biopsy. A stain for mucicarmine is  negative. Positive and negative controls function appropriately.  Final diagnosis: Specimen submitted as right lung biopsy  -Squamous cell carcinoma  (Electronically Signed: 2018-02-20 09:12:07 )  Diagnosed by: Phong Thompson  FINAL PATHOLOGIC DIAGNOSIS  Fragments of pulmonary parenchyma (submitted as right lung biopsy):    FINAL PATHOLOGIC DIAGNOSIS 1. Lymph node, right axilla, biopsy, review of 10 outside slides [facility], JS 18 1 [id number],  collected January 11, 2018: Metastatic poorly differentiated carcinoma (see comment).  The histologic section shows fibrous stroma infiltrated by nest of poorly differentiated malignant cells including  occasional  dyskeratotic cells morphologically suggestive of metastatic squamous cell carcinoma.  Immunohistochemical stains are nonspecific; the cells are positive for cytokeratin 7 and cytokeratin 5/6 (focal) and  negative for cytokeratin 20, TTF-1, p63, GCDFP, mammoglobin, and CEA        PET/CT 2/21/2019  Increased size and irregularity of the right axillary lymph node with increased FDG avidity.  Although this would be atypical in location for lung carcinoma, the increased size and avidity must be concerning for metastatic disease in this patient with history of squamous cell lung cancer.     US Rt breast and mammo 2/26/2019  Impression:  Right  Lymph Node: Right axilla lymph node. Assessment: 4 - Suspicious finding. Biopsy is recommended.      BI-RADS Category:   Right: 4 - Suspicious  Overall: 4 - Suspicious     Recommendation:  Biopsy is recommended. Biopsy of the lymph node measuring 1.4 x 1.1 x 1.3 recommended , the one with the round shape configuration, corresponding to the most recent PET CT finding.         Pathology 3/11/2019   FINAL PATHOLOGIC DIAGNOSIS  Right axilla, mass, core biopsy:  Positive for poorly differentiated carcinoma.  See comment.  Comment:  The biopsy from the right axilla mass shows a poorly differentiated carcinoma in background lymphoid tissue  with enlarged cells showing increased nuclear size, prominent nucleoli, moderate amounts of cytoplasm and mitotic  figures. Immunostains are completed and reveal the tumor cells to stain positively with cytokeratin AE 1/AE3, CK  5/6 and CK 7. The tumor cells are negative for CK 20, Estrogen receptor, p63 and TTF-1. All stains have  satisfactory positive and negative controls. The patient's prior cases will be reviewed and an additional stain for  JUAREZ-3 is pending in an attempt to pinpoint the primary site of this malignancy and results will follow in a  supplemental report.      Supplemental Diagnosis  3/11/2019  The current axillary mass is compared  to the patient's prior right lung biopsy (case number [id number]) and the  current axillary mass is morphologically similar to the lung malignancy. Also, the current axillary mass is compared  to the patient's prior breast resection (Case number [id number]) and appears similar as well. P63 is negative in the  [id number] tumor and CK 5/6 shows scattered positive staining in the [id number] tumor.  Immunostain for JUAREZ-3 is completed and shows only rare weak to moderate staining within the tumor cell nuclei  with satisfactory positive and negative controls. This stain is positive in the surrounding lymphocytes. This staining  pattern is non-specific and does not definitively differentiate between a lung and breast primary malignancy in this  right axilla mass biopsy. The staining profile of the current axillary mass match more closely with the previous  breast carcinoma (due to the p63 negativity in both the 2014 breast cancer and the current axillary mass lesion but  p63 positivity in the lung mass from 2018).  This staining profile, together with the comparison with the patient's prior breast tumor and prior lung tumor supports  a diagnosis of poorly differentiated carcinoma and the malignancy appears morphologically similar to the prior  malignancies from both sites, but the staining profile in this small sample from the axillary mass more closely  correlates with the prior breast malignancy. Pathologic subclassification of this malignancy's primary location is not  definitive and clinical correlation is recommended definitively decide on possible primary location in this patient's  axillary mass sample.  Supplemental (2):  Additional immunohistochemical staining for progesterone receptor and HER2 are completed per clinician request  with following results:  Progesterone receptor: Negative; 0% nuclear tumor cell staining.  HER2: Negative; stain score = 0.  Supplemental (3):  [place] DIAGNOSIS:  FINAL DIAGNOSIS  Breast,  right, needle core biopsy ([id number]; 06/17/2014): Invasive ductal carcinoma, Jaiden grade III (of  III), with focal micropapillary features.  Immunohistochemical stains performed at the referring institution show that the tumor cells have the following  phenotype: - Estrogen receptor: Negative (0% tumor cells staining). - Progesterone receptor: Negative (0% tumor  cells staining). - HER2: Negative (score 0).  Lymph nodes, right, sentinel biopsy and lumpectomy ([id number]; 07/11/2014):  1. Right sentinel lymph node: A single (1) lymph node is negative for metastatic carcinoma.  Immunohistochemical stains performed by the referring institution and reviewed at [facility] for cytokeratin are  negative, confirming the diagnosis.  2. Right breast: Invasive ductal carcinoma with micropapillary features, per report 11.5 mm in greatest dimension.  Foci suspicious for lymphovascular invasion identified. Margins of resection are free of tumor.  Immunohistochemical stains performed by the referring institution and reviewed at [facility] show that the tumor  cells are negative for p63 and show patchy positivity for CK 5/6.  Lung, right, needle core biopsy ([id number]; 02/14/2018): Squamous cell carcinoma.    Immunohistochemical stains performed by the referring institution show the tumor cells are strongly positive for p63  and CK 5/6, while negative for TTF-1 and CK7. These result support the diagnosis. Axilla, right, needle core biopsy  ([id number]; 03/11/2019): Lymph node positive for metastatic carcinoma, most consistent with breast primary  (see comment).  Immunohistochemical stains performed by the referring institution and reviewed at [facility] show that the tumor  cells are negative for p63, focally positive for CK 5/6, focally and weakly positive for JUAREZ-3, and strongly positive  for CK7. They are also negative for estrogen receptor, progesterone receptor, and HER2 JAM (score 0).  COMMENT:  Thank you for  allowing me to review the case of this 73-year-old lady who recently underwent needle core biopsies  of a right axillary mass and who has a previous diagnosis of an invasive ductal carcinoma of the right breast, as well  as a squamous cell carcinoma of the lung. I am reviewing this case because of my special interest in breast  pathology.  I agree with your interpretation of this case. In my opinion, the lung tumor corresponds to a squamous cell  carcinoma and appears to be unrelated to the invasive ductal carcinoma of the breast. The axillary mass, in my  opinion, corresponds to metastases of the breast primary. Although it is a poorly differentiated carcinoma, the  immunophenotype is most consistent with the one that the breast primary exhibited, and is inconsistent with a  metastatic squamous cell carcinoma. I did share the case with Dr. Anabel Stone, one of our pulmonary pathologists,  and she concurs with this interpretation.  Note: Report attached.  Performing location:  [place] [address]    CT neck/chest/abd/pelvis w/contrast 4/26/2019   The previously described right hilar mass is no longer visible.  There is persistent volume loss in the right middle lobe this appears similar to the prior PET-CT February 21, 2019.    Persistent abnormal right axillary node today measuring about 15 mm compared 18 mm prior.  The positioning of the adjacent nodes is slightly different likely accounting for the slight difference in measurement.    Cluster of tree-in-bud nodular densities left lower lobe series 2, image 93.  This may be related to small airways disease though serial follow-up suggested.      PET/CT 6/20/2019   New and worsening hypermetabolic lymph nodes concerning for metastatic breast cancer as described above.  Tissue sampling would be required for definitive diagnosis.      2-D echo 6/27/2019   · Normal left ventricular systolic function.  The estimated ejection fraction is 55%  · Concentric left ventricular remodeling.  · Normal LV diastolic function.  · Normal right ventricular systolic function.  · Moderate left atrial enlargement.  · Mild right atrial enlargement.  · Mild aortic regurgitation.  · Mild mitral regurgitation.  · Mild tricuspid regurgitation.  · Normal central venous pressure (3 mm Hg).  · The estimated PA systolic pressure is 25 mm Hg      PET/CT 9/19/2019   Favorable response to therapy with interval decrease in size and uptake of several right axillary lymph nodes and a supraclavicular lymph node.  Mild residual activity may indicate viable tumor.    No new hypermetabolic findings worrisome for malignancy.    PET/CT 2/28/2020  1.  No evidence of hypermetabolic tumor.  Continued improvement of the right axilla status post adjuvant radiation.    2.  No new hypermetabolic lesions    Assessment:       1. Recurrent breast cancer, unspecified laterality    2.  History of Squamous cell carcinoma of right lung    3. Encounter for screening mammogram for malignant neoplasm of breast         Plan:   ECOG 0  1-3    Pt with hx of Stage 1A invasive ductal carcinoma rt breast s/p rt segmental mastectomy and SLN bx 7/11/2014 ER/MO neg HER 2 jose maria neg [id number](i-).  S/p adjuvant RT completed 9/2014 Pt declined adjuvant chemo  Follow-up Mammo 6/12/2017 No mammographic evidence of malignancy.  Pt  hospitalized 11/2017 with acute-on-chronic  resp failure  Abnormal CT imaging revealing Large right hilar mass with involvement of  adjacent segmental pulmonary arterial branches and bronchi with associated postobstructive atelectasis  and involvement of mediastinal and axillary adenopathy   S/p rt axillary LN bx ( outside facility) - metastatic poorly differentiated carcinoma of unknown primary  site  status post  lung biopsy 1/31/2017 -benign lung tissue  PET/CT 1/26/2018   Intense FDG uptake within the known right infrahilar lung mass, compatible with  malignancy.   Intensely hypermetabolic right axillary, retropectoral, and lower right cervical lymph nodes, compatible with metastases.  Abnormal FDG uptake along right breast skin thickening, new from prior chest CTA and mammogram.    Repeat lung bx 2/14/2018 revealed Squamous Cell CA lung  PD-L1 10% low expression  EGFR NEG  ALK NEG    Pt treated for advanced squamous cell CA of lung   She s/p  cycle 6 of carboplatin/Abraxane Day1 completed 7/2/2018 ( day 15 held due to prolonged cytopenias)  She completed therapy with near CR     PET/CT 2/21/2019 showed increased size and irregularity of the right axillary lymph node with increased FDG avidity    t MAMMO/US rt breast 3/2019  reveals suspicious findings rt axilla LN.  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 It was determined The axillary mass, in my opinion, corresponded  to metastases of the breast primary. Although it is a poorly differentiated carcinoma, theimmunophenotype is most consistent with the one that the breast primary exhibited, and is inconsistent with a metastatic squamous cell carcinoma.     CT imaging 4/26/2019 persistent rt axillary LAD, no other sites of disease     Pf followed by Rad/Onc regarding RT   It was determined to discuss potential surgical intervention involving alnd  Plan to discuss with breast surgeon , Dr. Alexandra potential surgical intervention       Lymph node biopsy 3/11/2019 Right axilla, mass, core biopsy:  Positive for poorly differentiated carcinoma.   favor breast primary   Pt was discussed at multidisciplinary tumor board  D/w  Pathologist    PET/CT 6/20/2019  New and worsening hypermetabolic lymph nodes concerning for metastatic breast cancer     Previously Discussed  imaging findings in detail with patient which reveals new and worsening hypermetabolic melena metabolic lymph nodes including hypermetabolic supraclavicular node.  Therefore, at treatment option will be systemic chemotherapy in light of the recent imaging findings.  She will be followed by her surgical oncologist Dr. Christopher      IT was determined to proceed with systemic chemotherapy   S/p  AC [id number] x 3 wks x 4 wks completed 9/6/2019   ( pt has not received in past)      PET/CT 9/19/2019 shows disease response with interval decrease in size and uptake of several right axillary lymph nodes and a supraclavicular lymph node.  Mild residual activity may indicate viable tumor.  No new hypermetabolic findings worrisome for malignancy.    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   She completed chemotherapy 9/6/2019  and has had a near complete response.  It was determined  Radiation will be given to the original areas of hypermetabolic activity in the right axilla and right supraclavicular region  Pt was presented at multidisciplinary tumor board    Pt completed  RT 12/16/2019      PET/CT 2/28/2020  No evidence of hypermetabolic tumor.  Continued improvement of the right axilla status post adjuvant radiation.    No new hypermetabolic lesions     Await results of workup planned per Pulmonology   Plan follow-up Mammo     Follow-up  1month with cbc,cmp prior to f/u     Advance Care Planning     Power of   I previously  initiated the process of advance care planning today and explained the importance of this process to the patient.  I introduced the concept of advance directives to the patient, as well. Then the patient received detailed information about the importance of  designating a Health Care Power of  (HCPOA). She was also instructed to communicate with this person about their wishes for future healthcare, should she become sick and lose decision-making capacity. The patient has previously appointed a HCPOA. After our discussion, the patient has decided to complete a HCPOA and has appointed her son and niece and  I spent a total time of 16 minutes discussing this issue with the patient.         Cc: Swetha Katz M.D.         MD Tory Roberson MD Raymond Gould, MD

## 2020-06-16 NOTE — PLAN OF CARE
Patient arrived to infusion center for monthly port flush. Tolerated well. VSS. Patient ambulated unassisted off unit by self to MD appointment, in NAD at time of discharge.

## 2020-06-26 ENCOUNTER — HOSPITAL ENCOUNTER (OUTPATIENT)
Dept: RADIOLOGY | Facility: HOSPITAL | Age: 75
Discharge: HOME | End: 2020-06-26
Attending: INTERNAL MEDICINE
Payer: MEDICARE

## 2020-06-26 DIAGNOSIS — Z12.31 ENCOUNTER FOR SCREENING MAMMOGRAM FOR MALIGNANT NEOPLASM OF BREAST: ICD-10-CM

## 2020-06-26 DIAGNOSIS — C50.919 RECURRENT BREAST CANCER, UNSPECIFIED LATERALITY: ICD-10-CM

## 2020-06-26 PROCEDURE — 77067 MAMMO DIGITAL SCREENING BILAT WITH TOMOSYNTHESIS_CAD: ICD-10-PCS | Mod: 26,HCNC,, | Performed by: RADIOLOGY

## 2020-06-26 PROCEDURE — 77063 MAMMO DIGITAL SCREENING BILAT WITH TOMOSYNTHESIS_CAD: ICD-10-PCS | Mod: 26,HCNC,, | Performed by: RADIOLOGY

## 2020-06-26 PROCEDURE — 77067 SCR MAMMO BI INCL CAD: CPT | Mod: 26,HCNC,, | Performed by: RADIOLOGY

## 2020-06-26 PROCEDURE — 77063 BREAST TOMOSYNTHESIS BI: CPT | Mod: 26,HCNC,, | Performed by: RADIOLOGY

## 2020-06-26 PROCEDURE — 77067 SCR MAMMO BI INCL CAD: CPT | Mod: TC,HCNC

## 2020-07-07 ENCOUNTER — TELEPHONE (OUTPATIENT)
Dept: FAMILY MEDICINE | Facility: CLINIC | Age: 75
End: 2020-07-07

## 2020-07-07 NOTE — TELEPHONE ENCOUNTER
Return call to Pt, and she states that she is taking a breathing Tx,clarinex, and nasal spray, and it's not working. That she is having a cough that won't go away and joint pain. That she took the antibiotic & steroids, and she still is feeling bad. Please advise.

## 2020-07-07 NOTE — TELEPHONE ENCOUNTER
----- Message from Dania Cabezas sent at 7/7/2020 11:47 AM CDT -----  Regarding: Patient Call Back  Who called:CHOLO HARRIS [[id number]]    What is the request in detail: Patient is requesting a call back. She would not go into further details other than she is not doing any better. She would like to further discuss.   Please advise.    Can the clinic reply by [facility]? No    Best call back number: [phone]    Additional Information: N/A

## 2020-07-08 ENCOUNTER — TELEPHONE (OUTPATIENT)
Dept: FAMILY MEDICINE | Facility: CLINIC | Age: 75
End: 2020-07-08

## 2020-07-17 ENCOUNTER — OFFICE VISIT (OUTPATIENT)
Dept: CARDIOLOGY | Facility: CLINIC | Age: 75
End: 2020-07-17
Payer: MEDICARE

## 2020-07-17 VITALS
RESPIRATION RATE: 16 BRPM | OXYGEN SATURATION: 96 % | DIASTOLIC BLOOD PRESSURE: 76 MMHG | BODY MASS INDEX: 32.02 KG/M2 | WEIGHT: 180.75 LBS | HEART RATE: 61 BPM | SYSTOLIC BLOOD PRESSURE: 120 MMHG

## 2020-07-17 DIAGNOSIS — I70.0 ATHEROSCLEROSIS OF AORTA: Chronic | ICD-10-CM

## 2020-07-17 DIAGNOSIS — R06.09 DOE (DYSPNEA ON EXERTION): ICD-10-CM

## 2020-07-17 DIAGNOSIS — I25.2 OLD MYOCARDIAL INFARCTION: ICD-10-CM

## 2020-07-17 DIAGNOSIS — R73.03 PREDIABETES: ICD-10-CM

## 2020-07-17 DIAGNOSIS — C50.919 METASTATIC BREAST CANCER: Chronic | ICD-10-CM

## 2020-07-17 DIAGNOSIS — R06.02 SOB (SHORTNESS OF BREATH): Primary | ICD-10-CM

## 2020-07-17 DIAGNOSIS — E78.5 HYPERLIPIDEMIA LDL GOAL <70: Chronic | ICD-10-CM

## 2020-07-17 DIAGNOSIS — Z85.118 HISTORY OF LUNG CANCER: ICD-10-CM

## 2020-07-17 DIAGNOSIS — C34.90 SQUAMOUS CELL CARCINOMA OF LUNG, UNSPECIFIED LATERALITY: Chronic | ICD-10-CM

## 2020-07-17 DIAGNOSIS — J44.1 CHRONIC OBSTRUCTIVE PULMONARY DISEASE WITH ACUTE EXACERBATION: Chronic | ICD-10-CM

## 2020-07-17 DIAGNOSIS — I25.10 CORONARY ARTERY DISEASE INVOLVING NATIVE CORONARY ARTERY OF NATIVE HEART WITHOUT ANGINA PECTORIS: Chronic | ICD-10-CM

## 2020-07-17 DIAGNOSIS — I11.9 BENIGN HYPERTENSIVE HEART DISEASE WITHOUT HEART FAILURE: Chronic | ICD-10-CM

## 2020-07-17 PROCEDURE — 3074F SYST BP LT 130 MM HG: CPT | Mod: HCNC,CPTII,S$GLB, | Performed by: INTERNAL MEDICINE

## 2020-07-17 PROCEDURE — 99499 UNLISTED E&M SERVICE: CPT | Mod: HCNC,S$GLB,, | Performed by: INTERNAL MEDICINE

## 2020-07-17 PROCEDURE — 93000 EKG 12-LEAD: ICD-10-PCS | Mod: HCNC,S$GLB,, | Performed by: INTERNAL MEDICINE

## 2020-07-17 PROCEDURE — 99214 OFFICE O/P EST MOD 30 MIN: CPT | Mod: HCNC,S$GLB,, | Performed by: INTERNAL MEDICINE

## 2020-07-17 PROCEDURE — 1126F PR PAIN SEVERITY QUANTIFIED, NO PAIN PRESENT: ICD-10-PCS | Mod: HCNC,S$GLB,, | Performed by: INTERNAL MEDICINE

## 2020-07-17 PROCEDURE — 3078F DIAST BP <80 MM HG: CPT | Mod: HCNC,CPTII,S$GLB, | Performed by: INTERNAL MEDICINE

## 2020-07-17 PROCEDURE — 3074F PR MOST RECENT SYSTOLIC BLOOD PRESSURE < 130 MM HG: ICD-10-PCS | Mod: HCNC,CPTII,S$GLB, | Performed by: INTERNAL MEDICINE

## 2020-07-17 PROCEDURE — 1126F AMNT PAIN NOTED NONE PRSNT: CPT | Mod: HCNC,S$GLB,, | Performed by: INTERNAL MEDICINE

## 2020-07-17 PROCEDURE — 1159F PR MEDICATION LIST DOCUMENTED IN MEDICAL RECORD: ICD-10-PCS | Mod: HCNC,S$GLB,, | Performed by: INTERNAL MEDICINE

## 2020-07-17 PROCEDURE — 99999 PR PBB SHADOW E&M-EST. PATIENT-LVL IV: CPT | Mod: PBBFAC,HCNC,, | Performed by: INTERNAL MEDICINE

## 2020-07-17 PROCEDURE — 3008F PR BODY MASS INDEX (BMI) DOCUMENTED: ICD-10-PCS | Mod: HCNC,CPTII,S$GLB, | Performed by: INTERNAL MEDICINE

## 2020-07-17 PROCEDURE — 3008F BODY MASS INDEX DOCD: CPT | Mod: HCNC,CPTII,S$GLB, | Performed by: INTERNAL MEDICINE

## 2020-07-17 PROCEDURE — 1101F PT FALLS ASSESS-DOCD LE1/YR: CPT | Mod: HCNC,CPTII,S$GLB, | Performed by: INTERNAL MEDICINE

## 2020-07-17 PROCEDURE — 1101F PR PT FALLS ASSESS DOC 0-1 FALLS W/OUT INJ PAST YR: ICD-10-PCS | Mod: HCNC,CPTII,S$GLB, | Performed by: INTERNAL MEDICINE

## 2020-07-17 PROCEDURE — 1159F MED LIST DOCD IN RCRD: CPT | Mod: HCNC,S$GLB,, | Performed by: INTERNAL MEDICINE

## 2020-07-17 PROCEDURE — 93000 ELECTROCARDIOGRAM COMPLETE: CPT | Mod: HCNC,S$GLB,, | Performed by: INTERNAL MEDICINE

## 2020-07-17 PROCEDURE — 99214 PR OFFICE/OUTPT VISIT, EST, LEVL IV, 30-39 MIN: ICD-10-PCS | Mod: HCNC,S$GLB,, | Performed by: INTERNAL MEDICINE

## 2020-07-17 PROCEDURE — 99999 PR PBB SHADOW E&M-EST. PATIENT-LVL IV: ICD-10-PCS | Mod: PBBFAC,HCNC,, | Performed by: INTERNAL MEDICINE

## 2020-07-17 PROCEDURE — 3078F PR MOST RECENT DIASTOLIC BLOOD PRESSURE < 80 MM HG: ICD-10-PCS | Mod: HCNC,CPTII,S$GLB, | Performed by: INTERNAL MEDICINE

## 2020-07-17 PROCEDURE — 99499 RISK ADDL DX/OHS AUDIT: ICD-10-PCS | Mod: HCNC,S$GLB,, | Performed by: INTERNAL MEDICINE

## 2020-07-17 NOTE — PROGRESS NOTES
CARDIOVASCULAR CONSULTATION    REASON FOR CONSULT:   Ade Castellano is a 74 y.o. female who presents for preoperative risk assessment.      HISTORY OF PRESENT ILLNESS:     Patient is a pleasant 74-year-old lady.  Here for preoperative risk assessment prior to lymph node resection.  She has a past medical history significant for coronary artery disease status post PCI in 2006.  Since then has been angina free.  Had a stress test performed in October of 2018 which was normal.  Denies any anginal sounding chest pains, orthopnea, PND.  Does have mild dyspnea on exertion, which she states has actually been improving since her stress test in 2018.  Denies any other cardiac anginal symptoms.    Notes from July 2020:  Patient here for follow-up.  States that underwent radiation therapy for her lymph nodes.  Has been told that her cancer is gone now.  States that lately has been noticing severe dyspnea on exertion.  Can only walk 10 ft and then has to stop.  Also has dry cough all the time.  Not related to postural or activity.  Denies any chest tightness but does have severe dyspnea on exertion.  Denies any orthopnea, PND.      PAST MEDICAL HISTORY:     Past Medical History:   Diagnosis Date    Acute respiratory failure with hypoxia and hypercapnia 11/29/2017    Angina pectoris     Arthritis     Bell's palsy     left facial weakness    Breast cancer     RIGHT    CAD (coronary artery disease)     Cervical cancer     Chronic bronchitis     COPD (chronic obstructive pulmonary disease)     Dr. Katz    Dental bridge present     Emphysema of lung     H/O colonoscopy 06/2017    due for repeat colonsocopy in 6/2018    History of Bell's palsy     History of heart artery stent     Dr. Ortiz  x2 stents    Hyperlipidemia     Hypertension     Lung cancer     Myocardial infarction     SUNITHA (obstructive sleep apnea)     intolerant to mask    SUNITHA (obstructive sleep apnea)     intolerant to mask     Pneumonia      Pneumonia due to other staphylococcus     PUD (peptic ulcer disease)     Severe anemia 6/11/2018    Sleep apnea     Vaginal delivery     x1       PAST SURGICAL HISTORY:     Past Surgical History:   Procedure Laterality Date    ADENOIDECTOMY      BREAST BIOPSY Right     x3    BREAST LUMPECTOMY      BREAST SURGERY      lumpectomy right side     CERVIX SURGERY      cone    COLONOSCOPY N/A 3/17/2017    Procedure: COLONOSCOPY;  Surgeon: Julio Rudd MD;  Location: [facility];  Service: Endoscopy;  Laterality: N/A;    COLONOSCOPY N/A 6/30/2017    Procedure: COLONOSCOPY;  Surgeon: Julio Rudd MD;  Location: [facility] ENDO;  Service: Endoscopy;  Laterality: N/A;    COLONOSCOPY N/A 11/28/2018    Procedure: COLONOSCOPY;  Surgeon: Nura Urbina MD;  Location: [facility] ENDO;  Service: Endoscopy;  Laterality: N/A;    COLONOSCOPY N/A 1/29/2019    Procedure: COLONOSCOPY;  Surgeon: Emmanuel Perez MD;  Location: [facility] END[facility];  Service: Endoscopy;  Laterality: N/A;  confirmed appt-SP    EYE SURGERY Bilateral 06/08/2018    cataract     PORTACATH PLACEMENT Right 01/2018    sweat glands axillary regions Bilateral     TONSILLECTOMY         ALLERGIES AND MEDICATION:   Review of patient's allergies indicates:  No Known Allergies     Medication List          Accurate as of July 17, 2020  1:35 PM. If you have any questions, ask your nurse or doctor.            CONTINUE taking these medications    * albuterol 90 mcg/actuation inhaler  Commonly known as: VENTOLIN HFA  INHALE 2 PUFF(S) BY MOUTH EVERY 4 - 6 HOURS AS NEEDED FOR SHORTNESS OF BREATH or WHEEZING     * albuterol 2.5 mg /3 mL (0.083 %) nebulizer solution  Commonly known as: PROVENTIL  NEBULIZE 1 vial EVERY 6 HOURS AS NEEDED FOR WHEEZING     aspirin 325 MG EC tablet  Commonly known as: ECOTRIN     desloratadine 5 mg tablet  Commonly known as: CLARINEX  Take 1 tablet (5 mg total) by mouth once daily.     fluticasone propionate 50 mcg/actuation nasal spray  Commonly known as:  FLONASE  USE TWO SPRAYS IN EACH NOSTRIL ONCE A DAY     influenza 180 mcg/0.5 mL vaccine  Commonly known as: FLUZONE HIGH-DOSE     isosorbide mononitrate 30 MG 24 hr tablet  Commonly known as: IMDUR  Take 1 tablet (30 mg total) by mouth once daily.     levoFLOXacin 500 MG tablet  Commonly known as: LEVAQUIN  Take 1 tablet (500 mg total) by mouth once daily.     metoprolol tartrate 25 MG tablet  Commonly known as: LOPRESSOR  Take 1 tablet (25 mg total) by mouth 2 (two) times daily.     nitroGLYCERIN 0.4 MG SL tablet  Commonly known as: NITROSTAT  place 1 tablet under the tongue AS NEEDED. no more than 3 in 15 minutes     PAZEO 0.7 % Drop  Generic drug: olopatadine     rosuvastatin 10 MG tablet  Commonly known as: CRESTOR  Take 1 tablet (10 mg total) by mouth once daily.     TRELEGY ELLIPTA 100-62.5-25 mcg Dsdv  Generic drug: fluticasone-umeclidin-vilanter         * This list has 2 medication(s) that are the same as other medications prescribed for you. Read the directions carefully, and ask your doctor or other care provider to review them with you.                SOCIAL HISTORY:     Social History     Socioeconomic History    Marital status: Single     Spouse name: Not on file    Number of children: Not on file    Years of education: Not on file    Highest education level: Not on file   Occupational History    Not on file   Social Needs    Financial resource strain: Not on file    Food insecurity     Worry: Not on file     Inability: Not on file    Transportation needs     Medical: Not on file     Non-medical: Not on file   Tobacco Use    Smoking status: Former Smoker     Packs/day: 0.25     Years: 50.00     Pack years: 12.50     Types: Cigarettes     Quit date: 2017     Years since quittin.6    Smokeless tobacco: Never Used   Substance and Sexual Activity    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     Drug use: No    Sexual activity: Never   Lifestyle    Physical activity     Days per  week: Not on file     Minutes per session: Not on file    Stress: Not at all   Relationships    Social connections     Talks on phone: Not on file     Gets together: Not on file     Attends Mosque service: Not on file     Active member of club or organization: Not on file     Attends meetings of clubs or organizations: Not on file     Relationship status: Not on file   Other Topics Concern    Not on file   Social History Narrative    Not on file       FAMILY HISTORY:     Family History   Problem Relation Age of Onset    Cancer Mother         breast    Heart disease Mother     Breast cancer Mother     Cancer Father         lung-smoker     Cancer Sister         lung-smoker     Heart attack Sister     Cancer Maternal Grandmother     Heart disease Maternal Grandmother     Cancer Maternal Grandfather     Heart disease Maternal Grandfather     Cancer Paternal Grandmother     Cancer Paternal Grandfather     Cancer Sister         mets not sure where it started     COPD Sister     Kidney cancer Son        REVIEW OF SYSTEMS:   Review of Systems   Constitution: Positive for malaise/fatigue.   HENT: Negative.    Eyes: Negative.    Cardiovascular: Positive for dyspnea on exertion.   Respiratory: Negative.    Endocrine: Negative.    Hematologic/Lymphatic: Negative.    Skin: Negative.    Musculoskeletal: Negative.    Gastrointestinal: Negative.    Genitourinary: Negative.    Neurological: Negative.    Psychiatric/Behavioral: Negative.    Allergic/Immunologic: Negative.        A 10 point review of systems was performed and all the pertinent positives have been mentioned. Rest of review of systems was negative.        PHYSICAL EXAM:     Vitals:    07/17/20 1307   BP: 120/76   Pulse: 61   Resp: 16    Body mass index is 32.02 kg/m².  Weight: 82 kg (180 lb 12.4 oz)         Physical Exam   Constitutional: She is oriented to person, place, and time. She appears well-developed and well-nourished.   HENT:   Head:  Normocephalic.   Eyes: Pupils are equal, round, and reactive to light.   Neck: Normal range of motion. Neck supple.   Cardiovascular: Normal rate and regular rhythm.   Pulmonary/Chest: Effort normal and breath sounds normal.   Abdominal: Soft. Normal appearance and bowel sounds are normal. There is no abdominal tenderness.   Musculoskeletal: Normal range of motion.   Neurological: She is alert and oriented to person, place, and time.   Skin: Skin is warm.   Psychiatric: She has a normal mood and affect.         DATA:     Laboratory:  CBC:  Recent Labs   Lab 02/03/20  0730 02/28/20  0720 06/16/20  0926   WBC 3.98 4.50 6.85   Hemoglobin 13.2 13.0 13.9   Hematocrit 40.0 40.9 42.8   Platelets 173 179 217       CHEMISTRIES:  Recent Labs   Lab 08/16/19  0727 09/06/19  0734  02/03/20  0730 02/28/20  0720 06/16/20  0926   Glucose 137 H 138 H   < > 112 H 134 H 120 H   Sodium 143 142   < > 140 144 139   Potassium 4.4 3.6   < > 4.4 4.6 3.6   BUN, Bld 12 11   < > 18 14 19   Creatinine 0.8 0.8   < > 0.8 0.8 1.1   eGFR if African American >60 >60   < > >60 >60 58 A   eGFR if non African American >60 >60   < > >60 >60 50 A   Calcium 10.1 9.2   < > 9.7 9.7 9.2   Magnesium 1.8 1.8  --   --  1.8  --     < > = values in this interval not displayed.       CARDIAC BIOMARKERS:  Recent Labs   Lab 11/29/17  1153 06/11/18  1345 07/20/19  1249   Troponin I 0.193 H <0.006 0.012       COAGS:  Recent Labs   Lab 11/29/17  0304 02/09/18  1020 07/20/19  1249   INR 1.0 1.0 1.0       LIPIDS/LFTS:  Recent Labs   Lab 07/20/17  0814  03/04/19  0728  02/03/20  0730 02/28/20  0720 06/16/20  0926   Cholesterol 140  --  166  --  125  --   --    Triglycerides 171 H  --  192 H  --  117  --   --    HDL 51  --  56  --  48  --   --    LDL Cholesterol 54.8 L  --  71.6  --  53.6 L  --   --    Non-HDL Cholesterol 89  --  110  --  77  --   --    AST 16   < > 18   < > 19 19 18   ALT 16   < > 19   < > 20 22 24    < > = values in this interval not displayed.        Hemoglobin A1C   Date Value Ref Range Status   02/03/2020 6.1 (H) 4.0 - 5.6 % Final     Comment:     ADA Screening Guidelines:  5.7-6.4%  Consistent with prediabetes  >or=6.5%  Consistent with diabetes  High levels of fetal hemoglobin interfere with the HbA1C  assay. Heterozygous hemoglobin variants (HbS, HgC, etc)do  not significantly interfere with this assay.   However, presence of multiple variants may affect accuracy.     03/04/2019 6.1 (H) 4.0 - 5.6 % Final     Comment:     ADA Screening Guidelines:  5.7-6.4%  Consistent with prediabetes  >or=6.5%  Consistent with diabetes  High levels of fetal hemoglobin interfere with the HbA1C  assay. Heterozygous hemoglobin variants (HbS, HgC, etc)do  not significantly interfere with this assay.   However, presence of multiple variants may affect accuracy.     03/16/2018 5.8 (H) 4.0 - 5.6 % Final     Comment:     According to ADA guidelines, hemoglobin A1c <7.0% represents  optimal control in non-pregnant diabetic patients. Different  metrics may apply to specific patient populations.   Standards of Medical Care in Diabetes-2016.  For the purpose of screening for the presence of diabetes:  <5.7%     Consistent with the absence of diabetes  5.7-6.4%  Consistent with increasing risk for diabetes   (prediabetes)  >or=6.5%  Consistent with diabetes  Currently, no consensus exists for use of hemoglobin A1c  for diagnosis of diabetes for children.  This Hemoglobin A1c assay has significant interference with fetal   hemoglobin   (HbF). The results are invalid for patients with abnormal amounts of   HbF,   including those with known Hereditary Persistence   of Fetal Hemoglobin. Heterozygous hemoglobin variants (HbAS, HbAC,   HbAD, HbAE, HbA2) do not significantly interfere with this assay;   however, presence of multiple variants in a sample may impact the %   interference.         TSH  Recent Labs   Lab 07/20/17  0814 11/29/17  0304   TSH 2.632 0.726       The ASCVD Risk  score (Rosario AVINA Jr., et al., 2013) failed to calculate for the following reasons:    The patient has a prior MI or stroke diagnosis           Cardiovascular Testing:    EKG: (personally reviewed tracing)  Sinus bradycardia      ASSESSMENT AND PLAN     Patient Active Problem List   Diagnosis    Benign hypertensive heart disease without heart failure    CAD (coronary artery disease)    History of breast cancer    Hyperlipidemia LDL goal <70    Old myocardial infarction    Stable angina    Atherosclerosis of aorta    Prediabetes    DNR (do not resuscitate)    COPD (chronic obstructive pulmonary disease)    Squamous cell carcinoma lung    History of lung cancer    Metastatic breast cancer    Aortic arch atherosclerosis     Patient with a past medical history of coronary artery disease.  Going for lymph node resection.  Past medical history significant for lung cancer.  Now significant dyspnea on exertion which has worsened over the past few months.  Can only walk 10 ft and then has to stop because of shortness of breath.  We will do a stress test and echocardiogram for further evaluation      Continue current medical therapy.    Follow-up in Cardiology Clinic after testing            Thank you very much for involving me in the care of your patient.  Please do not hesitate to contact me if there are any questions.      Guevara Skelton MD, FACC, [facility]  Interventional Cardiologist, [facility].           This note was dictated with the help of speech recognition software.  There might be un-intended errors and/or substitutions.

## 2020-07-17 NOTE — PROGRESS NOTES
Patient, Ade Castellano (MRN #[id number]), presented with a recent Estimated PA Systolic Pressure greater than 40 mmHG consistent with the definition of pulmonary hypertension (ICD10 - I27.0).    Est. PA Systolic Pressure   Date Value Ref Range Status   11/29/2017 54.44 (A)       The patient's pulmonary hypertension was monitored, evaluated, addressed and/or treated. This addendum to the medical record is made on 07/17/2020.

## 2020-07-21 ENCOUNTER — LAB VISIT (OUTPATIENT)
Dept: LAB | Facility: HOSPITAL | Age: 75
End: 2020-07-21
Attending: INTERNAL MEDICINE
Payer: MEDICARE

## 2020-07-21 ENCOUNTER — INFUSION (OUTPATIENT)
Dept: INFUSION THERAPY | Facility: HOSPITAL | Age: 75
End: 2020-07-21
Attending: INTERNAL MEDICINE
Payer: MEDICARE

## 2020-07-21 ENCOUNTER — OFFICE VISIT (OUTPATIENT)
Dept: HEMATOLOGY/ONCOLOGY | Facility: CLINIC | Age: 75
End: 2020-07-21
Payer: MEDICARE

## 2020-07-21 VITALS
SYSTOLIC BLOOD PRESSURE: 138 MMHG | OXYGEN SATURATION: 95 % | WEIGHT: 179.25 LBS | DIASTOLIC BLOOD PRESSURE: 88 MMHG | HEART RATE: 61 BPM | BODY MASS INDEX: 31.76 KG/M2 | HEIGHT: 63 IN | TEMPERATURE: 98 F

## 2020-07-21 DIAGNOSIS — G47.33 OSA (OBSTRUCTIVE SLEEP APNEA): ICD-10-CM

## 2020-07-21 DIAGNOSIS — C34.91 SQUAMOUS CELL CARCINOMA OF RIGHT LUNG: ICD-10-CM

## 2020-07-21 DIAGNOSIS — C34.90 SQUAMOUS CELL CARCINOMA OF LUNG, UNSPECIFIED LATERALITY: ICD-10-CM

## 2020-07-21 DIAGNOSIS — J44.9 CHRONIC OBSTRUCTIVE PULMONARY DISEASE, UNSPECIFIED COPD TYPE: ICD-10-CM

## 2020-07-21 DIAGNOSIS — C50.919 RECURRENT BREAST CANCER, UNSPECIFIED LATERALITY: Primary | ICD-10-CM

## 2020-07-21 DIAGNOSIS — C34.90 SQUAMOUS CELL CARCINOMA LUNG: Primary | ICD-10-CM

## 2020-07-21 DIAGNOSIS — R91.8 OTHER NONSPECIFIC ABNORMAL FINDING OF LUNG FIELD: ICD-10-CM

## 2020-07-21 DIAGNOSIS — R05.9 COUGH: ICD-10-CM

## 2020-07-21 LAB
ALBUMIN SERPL BCP-MCNC: 3.9 G/DL (ref 3.5–5.2)
ALP SERPL-CCNC: 52 U/L (ref 55–135)
ALT SERPL W/O P-5'-P-CCNC: 15 U/L (ref 10–44)
ANION GAP SERPL CALC-SCNC: 12 MMOL/L (ref 8–16)
AST SERPL-CCNC: 17 U/L (ref 10–40)
BILIRUB SERPL-MCNC: 0.4 MG/DL (ref 0.1–1)
BUN SERPL-MCNC: 14 MG/DL (ref 8–23)
CALCIUM SERPL-MCNC: 9.3 MG/DL (ref 8.7–10.5)
CHLORIDE SERPL-SCNC: 104 MMOL/L (ref 95–110)
CO2 SERPL-SCNC: 26 MMOL/L (ref 23–29)
CREAT SERPL-MCNC: 0.8 MG/DL (ref 0.5–1.4)
ERYTHROCYTE [DISTWIDTH] IN BLOOD BY AUTOMATED COUNT: 14.3 % (ref 11.5–14.5)
EST. GFR  (AFRICAN AMERICAN): >60 ML/MIN/1.73 M^2
EST. GFR  (NON AFRICAN AMERICAN): >60 ML/MIN/1.73 M^2
GLUCOSE SERPL-MCNC: 140 MG/DL (ref 70–110)
HCT VFR BLD AUTO: 41 % (ref 37–48.5)
HGB BLD-MCNC: 13 G/DL (ref 12–16)
IMM GRANULOCYTES # BLD AUTO: 0.04 K/UL (ref 0–0.04)
MCH RBC QN AUTO: 30.7 PG (ref 27–31)
MCHC RBC AUTO-ENTMCNC: 31.7 G/DL (ref 32–36)
MCV RBC AUTO: 97 FL (ref 82–98)
NEUTROPHILS # BLD AUTO: 3.5 K/UL (ref 1.8–7.7)
PLATELET # BLD AUTO: 253 K/UL (ref 150–350)
PMV BLD AUTO: 8.8 FL (ref 9.2–12.9)
POTASSIUM SERPL-SCNC: 3.8 MMOL/L (ref 3.5–5.1)
PROT SERPL-MCNC: 6.6 G/DL (ref 6–8.4)
RBC # BLD AUTO: 4.23 M/UL (ref 4–5.4)
SODIUM SERPL-SCNC: 142 MMOL/L (ref 136–145)
WBC # BLD AUTO: 5.13 K/UL (ref 3.9–12.7)

## 2020-07-21 PROCEDURE — 1159F PR MEDICATION LIST DOCUMENTED IN MEDICAL RECORD: ICD-10-PCS | Mod: HCNC,S$GLB,, | Performed by: INTERNAL MEDICINE

## 2020-07-21 PROCEDURE — 3075F SYST BP GE 130 - 139MM HG: CPT | Mod: HCNC,CPTII,S$GLB, | Performed by: INTERNAL MEDICINE

## 2020-07-21 PROCEDURE — 85027 COMPLETE CBC AUTOMATED: CPT | Mod: HCNC

## 2020-07-21 PROCEDURE — 96523 IRRIG DRUG DELIVERY DEVICE: CPT | Mod: HCNC

## 2020-07-21 PROCEDURE — 99499 UNLISTED E&M SERVICE: CPT | Mod: HCNC,S$GLB,, | Performed by: INTERNAL MEDICINE

## 2020-07-21 PROCEDURE — 25000003 PHARM REV CODE 250: Mod: HCNC | Performed by: INTERNAL MEDICINE

## 2020-07-21 PROCEDURE — 3008F PR BODY MASS INDEX (BMI) DOCUMENTED: ICD-10-PCS | Mod: HCNC,CPTII,S$GLB, | Performed by: INTERNAL MEDICINE

## 2020-07-21 PROCEDURE — 99999 PR PBB SHADOW E&M-EST. PATIENT-LVL IV: ICD-10-PCS | Mod: PBBFAC,HCNC,, | Performed by: INTERNAL MEDICINE

## 2020-07-21 PROCEDURE — A4216 STERILE WATER/SALINE, 10 ML: HCPCS | Mod: HCNC | Performed by: INTERNAL MEDICINE

## 2020-07-21 PROCEDURE — 1101F PT FALLS ASSESS-DOCD LE1/YR: CPT | Mod: HCNC,CPTII,S$GLB, | Performed by: INTERNAL MEDICINE

## 2020-07-21 PROCEDURE — 3079F PR MOST RECENT DIASTOLIC BLOOD PRESSURE 80-89 MM HG: ICD-10-PCS | Mod: HCNC,CPTII,S$GLB, | Performed by: INTERNAL MEDICINE

## 2020-07-21 PROCEDURE — 1126F PR PAIN SEVERITY QUANTIFIED, NO PAIN PRESENT: ICD-10-PCS | Mod: HCNC,S$GLB,, | Performed by: INTERNAL MEDICINE

## 2020-07-21 PROCEDURE — 80053 COMPREHEN METABOLIC PANEL: CPT | Mod: HCNC

## 2020-07-21 PROCEDURE — 99215 OFFICE O/P EST HI 40 MIN: CPT | Mod: HCNC,S$GLB,, | Performed by: INTERNAL MEDICINE

## 2020-07-21 PROCEDURE — 3075F PR MOST RECENT SYSTOLIC BLOOD PRESS GE 130-139MM HG: ICD-10-PCS | Mod: HCNC,CPTII,S$GLB, | Performed by: INTERNAL MEDICINE

## 2020-07-21 PROCEDURE — 36415 COLL VENOUS BLD VENIPUNCTURE: CPT | Mod: HCNC

## 2020-07-21 PROCEDURE — 99215 PR OFFICE/OUTPT VISIT, EST, LEVL V, 40-54 MIN: ICD-10-PCS | Mod: HCNC,S$GLB,, | Performed by: INTERNAL MEDICINE

## 2020-07-21 PROCEDURE — 99499 RISK ADDL DX/OHS AUDIT: ICD-10-PCS | Mod: HCNC,S$GLB,, | Performed by: INTERNAL MEDICINE

## 2020-07-21 PROCEDURE — 1159F MED LIST DOCD IN RCRD: CPT | Mod: HCNC,S$GLB,, | Performed by: INTERNAL MEDICINE

## 2020-07-21 PROCEDURE — 99999 PR PBB SHADOW E&M-EST. PATIENT-LVL IV: CPT | Mod: PBBFAC,HCNC,, | Performed by: INTERNAL MEDICINE

## 2020-07-21 PROCEDURE — 3079F DIAST BP 80-89 MM HG: CPT | Mod: HCNC,CPTII,S$GLB, | Performed by: INTERNAL MEDICINE

## 2020-07-21 PROCEDURE — 3008F BODY MASS INDEX DOCD: CPT | Mod: HCNC,CPTII,S$GLB, | Performed by: INTERNAL MEDICINE

## 2020-07-21 PROCEDURE — 1101F PR PT FALLS ASSESS DOC 0-1 FALLS W/OUT INJ PAST YR: ICD-10-PCS | Mod: HCNC,CPTII,S$GLB, | Performed by: INTERNAL MEDICINE

## 2020-07-21 PROCEDURE — 1126F AMNT PAIN NOTED NONE PRSNT: CPT | Mod: HCNC,S$GLB,, | Performed by: INTERNAL MEDICINE

## 2020-07-21 PROCEDURE — 63600175 PHARM REV CODE 636 W HCPCS: Mod: HCNC | Performed by: INTERNAL MEDICINE

## 2020-07-21 RX ORDER — HEPARIN 100 UNIT/ML
500 SYRINGE INTRAVENOUS
Status: DISCONTINUED | OUTPATIENT
Start: 2020-07-21 | End: 2020-07-21 | Stop reason: HOSPADM

## 2020-07-21 RX ORDER — SODIUM CHLORIDE 0.9 % (FLUSH) 0.9 %
10 SYRINGE (ML) INJECTION
Status: DISCONTINUED | OUTPATIENT
Start: 2020-07-21 | End: 2020-07-21 | Stop reason: HOSPADM

## 2020-07-21 RX ADMIN — Medication 10 ML: at 08:07

## 2020-07-21 RX ADMIN — HEPARIN 500 UNITS: 100 SYRINGE at 08:07

## 2020-07-21 NOTE — PROGRESS NOTES
Subjective:       Patient ID: Ade Castellano is a 74 y.o. female.    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   Pathology showed a 1.15 cm, grade 3 infiltrating ductal carcinoma.  One sentinel lymph node was negative for malignancy.  Margins were negative and the closest margin was 1 cm.  She was staged a little pT1c little pN0 cM0 stage IA.  She declined adjuvant chemo therapy.  She completed post operative radiation therapy at 400 cGy per fraction to 4000 cGy 9/2014         Pt hospitalized 11/2017   Ms. Castellano was admitted for acute on chronic respiratory failure requiring intubation and ventilation. Has large lung mass in right lung with probable post obstructive pneumonia resulting in COPD exacerbation. But also, patient had ran out of home O2 prior to onset of symptoms, likely contributing to presentation. Initiated on broad spectrum abx, IV steroids, duo-nebs and pulmonology consulted for ventilator co-management. Successfully extubated to BiPAP on 11/30. Then de-escalated to low flow nasal cannula. Abx tailored to augmentin for broad coverage, including anaerobic bacteria /     CTA chest 11/29/2017 revealed   Large right hilar mass with involvement of adjacent segmental pulmonary arterial branches and bronchi with associated postobstructive atelectasis and volume loss in the right middle lobe with adjacent ground glass opacity.  Findings highly concerning for underlying neoplastic process. Mediastinal and axillary adenopathy, with a right axillary lymph node measuring up to 2.0 cm in short axis  diameter. No definite evidence of pulmonary thromboembolism.    She is followed by Pulmonology   She underwent rt axillary LN bx at outside facility- on 1/16/2018 at [facility]  Pathology revealed metastatic poorly differentiated carcinoma of unknown primary site  TTF-1 negative, napsin negative  , cytokeratin 7 positive, cytokeratin 20 negative, p63 negative, cytokeratin 5/6 focal dim positivity    She next underwent lung bx at [facility] 1/31/2018   Pathology revealed benign lung tissue    She underwent Repeat Right Lung Bx 2/14/2018 Pathology reveals Squamous cell carcinoma  PD-L1 10% low expression  EGFR NEG  ALK NEG  BRAF NEG      Outside slides axillary LN specimen  requested for review/comparison to lung bx findings     1) Right Lung Bx 2/14/2018  Supplemental Diagnosis  Immunohistochemical stains show strong nuclear staining for p63 in essentially all tumor cells and very strong  cytoplasmic and membrane staining for CK5/6, also in essentially all tumor cells. TTF-1 and CK7 are negative  within tumor cells but do stain native pulmonary elements present within the biopsy. A stain for mucicarmine is  negative. Positive and negative controls function appropriately.  Final diagnosis: Specimen submitted as right lung biopsy  -Squamous cell carcinoma  (Electronically Signed: 2018-02-20 09:12:07 )  Diagnosed by: Phong Thompson  FINAL PATHOLOGIC DIAGNOSIS  Fragments of pulmonary parenchyma (submitted as right lung biopsy):    FINAL PATHOLOGIC DIAGNOSIS 1. Lymph node, right axilla, biopsy, review of 10 outside slides [facility], JS [id number] [id number],  collected January 11, 2018: Metastatic poorly differentiated carcinoma (see comment).  The histologic section shows fibrous stroma infiltrated by nest of poorly differentiated malignant cells including  occasional dyskeratotic cells morphologically suggestive of metastatic squamous cell carcinoma.  Immunohistochemical stains are nonspecific; the cells  are positive for cytokeratin 7 and cytokeratin 5/6 (focal) and  negative for cytokeratin 20, TTF-1, p63, GCDFP, mammoglobin, and CEA      PET/CT  4/19/2018 revealed there has been a excellent response to therapy.  At least 90% reduction in malignant activity.    She s/p  cycle 6 of carboplatin/Abraxane completed 7/2018        PET/CT 2/21/2019 increased size and irregularity of the right axillary lymph node with increased FDG avidity     MAMMO/US rt breast 2/2019 revealed suspicious findings rt axilla LN.  Biopsy of the lymph node measuring 1.4 x 1.1 x 1.3 recommended    Pathology 3/11/2019   FINAL PATHOLOGIC DIAGNOSIS  Right axilla, mass, core biopsy:  Positive for poorly differentiated carcinoma.  he staining profile of the current axillary mass match more closely with the previous  breast carcinoma (due to the p63 negativity in both the 2014 breast cancer and the current axillary mass lesion but  p63 positivity in the lung mass from 2018).  This staining profile, together with the comparison with the patient's prior breast tumor and prior lung tumor supports  a diagnosis of poorly differentiated carcinoma and the malignancy appears morphologically similar to the prior  malignancies from both sites, but the staining profile in this small sample from the axillary mass more closely  correlates with the prior breast malignancy. Pathologic subclassification of this malignancy's primary location is not  definitive and clinical correlation is recommended definitively decide on possible primary location in this patient's  axillary mass sample.  Supplemental (2):  Additional immunohistochemical staining for progesterone receptor and HER2 are completed per clinician request  with following results:  Progesterone receptor: Negative; 0% nuclear tumor cell staining.  HER2: Negative; stain score = 0.  Supplemental (3):    Slides sent to [facility] for outside review  It was determined that agree with  interpretation of this  case. In my opinion, the lung tumor corresponds to a squamous cell  carcinoma and appears to be unrelated to the invasive ductal carcinoma of the breast. The axillary mass, in my  opinion, corresponds to metastases of the breast primary. Although it is a poorly differentiated carcinoma, the  immunophenotype is most consistent with the one that the breast primary exhibited, and is inconsistent with a  metastatic squamous cell carcinoma. I      Further testing sent for cancer type ID also sent and results have revealed molecular diagnosis testing revealed head and neck salivary gland carcinoma probability 84% breast adenocarcinoma could not be excluded with a 12% probability      She is s/p AC q21d x 4 cycles completed 9/6/2019   PET/CT 9/19/2019 shows disease response with interval decrease in size and uptake of several right axillary lymph nodes and a supraclavicular lymph node.  Mild residual activity may indicate viable tumor.  No new hypermetabolic findings worrisome for malignancy.    Pt followed by Rad/Onc  She was presented at [facility] tumor board and it was determined to proceed with radiation only  She  completed  RT 12/16/2019   The right axilla and right supraclavicular region was treated at 200 cGy per fraction to 4400 cGy. The PET(+) disease in the right axilla and right supraclavicular fossa will be boosted at 200 cGy per fraction to 6000 cGy total dose.    Interval Hx 6/16/2020 She continues with LOGAN  She was prescribed Levaquin and steroids per PCP for COPD exacerbation  She reports cough has improved  She is followed by Dr. Katz, pulmonology  Further w/u planned including 2-D echo, CTA chest     Interval Hx: 7/21/2020   Pt reports she underwent extensive workup and it was determined that worsening cough/SOB likely from radiation  No new issues  Appetite and weight stable          PET/CT 2/28/2020  1.  No evidence of hypermetabolic tumor.  Continued improvement of the right axilla status post  adjuvant radiation.  2.  No new hypermetabolic lesions        PrevHx:She had an abnormal mammogram 6/4/2014 which  Revealed a round solid mass 6mm in rt breast.  She then underwent U/S guided core bx of rt breast mass on 6/17/2014.  Pathology revealed infiltrating ductal carcinoma Grade 3 with tumor  Present in thin-walled spaces suggestive of lymphatic spaces. Hormone  Receptor status on tumor specimen revealed ER negative 0% ,  FL negative 0% and Her 2 jose maria negative.  She subsequently underwent rt segmental mastectomy and SLN bx  On 7/11/2014. Pathology of rt breast lumpectomy revealed invasive  Ductal carcinoma with micropapillary pattern ( Invasive micropapillary  Ca) with max tumor dimension 11.5mm with suggestion of tumor in   Thin walled spaces c/w lymphovascular involvement. Surgical  Margins free of tumor, grade 3, ER/FL neg , Her 2 jose maria neg   With [place] Lymph node negative for Neoplasm.   Pathologic staging [id number](i-)  Her mother was diagnosed with breast cancer in her 50's/  She has no family history of ovarian cancer.           Review of Systems   Constitutional: Negative for appetite change, fever and unexpected weight change.   HENT: Negative for mouth sores and rhinorrhea.    Eyes: Negative for visual disturbance.   Respiratory: Positive for cough and shortness of breath (improved).    Cardiovascular: Negative for chest pain.   Gastrointestinal: Negative for abdominal pain and diarrhea.   Genitourinary: Negative for frequency.   Musculoskeletal: Positive for arthralgias. Negative for back pain.   Skin: Negative for rash.   Neurological: Negative for dizziness and headaches.   Hematological: Negative for adenopathy.   Psychiatric/Behavioral: The patient is not nervous/anxious.        Objective:          Vitals:    07/21/20 0813   BP: 138/88   BP Location: Left arm   Patient Position: Sitting   BP Method: Medium (Automatic)   Pulse: 61   Temp: 98.4 °F (36.9 °C)   TempSrc: Oral   SpO2: 95%   Weight:  "81.3 kg (179 lb 3.7 oz)   Height: 5' 3" (1.6 m)       Physical Exam   Constitutional: She is oriented to person, place, and time. She appears well-developed and well-nourished.   HENT:   Head: Normocephalic.   Mouth/Throat: Oropharynx is clear and moist. No oropharyngeal exudate.   Eyes: Conjunctivae and lids are normal. Pupils are equal, round, and reactive to light. No scleral icterus.   Neck: Normal range of motion. Neck supple. No thyromegaly present.   Cardiovascular: Normal rate, regular rhythm and normal heart sounds.    No murmur heard.  Pulmonary/Chest: Breath sounds normal. She has no wheezes. She has no rales.   Abdominal: Soft. Bowel sounds are normal. She exhibits no distension and no mass. There is no hepatosplenomegaly. There is no tenderness. There is no rebound and no guarding.   Musculoskeletal: Normal range of motion. She exhibits no edema and no tenderness.   Lymphadenopathy:     She has no cervical adenopathy.     She has no axillary adenopathy.        Right: No supraclavicular adenopathy present.        Left: No supraclavicular adenopathy present.   Neurological: She is alert and oriented to person, place, and time. No cranial nerve deficit. Coordination normal.   Skin: Skin is warm and dry. No ecchymosis, no petechiae and no rash noted. No erythema.   Psychiatric: She has a normal mood and affect.             CT a/p w/contrast 11/29/2018   1. No acute abnormality identified within the abdomen and pelvis.    2.  There are a few nonspecific prominent lymph nodes in the upper abdomen including a periesophageal lymph node which measures 1.0 cm in short axis diameter.    3.  Significant abdominal aortic atherosclerosis and abdominal aorta ectasia.    4.  Additional findings as above.    PET/CT 1/26/2018   1.  Intense FDG uptake within the known right infrahilar lung mass, compatible with malignancy.  It is unclear if this represents primary lung malignancy or metastatic breast cancer.    2. "  Intensely hypermetabolic right axillary, retropectoral, and lower right cervical lymph nodes, compatible with metastases.    3.  Abnormal FDG uptake along right breast skin thickening, new from prior chest CTA and mammogram.  This may relate to localized edema/inflammation, though correlation with physical exam and mammography are recommended to exclude underlying inflammatory carcinoma.      MRI Brain w w/out contrast 2/9/2018  1.  No evidence of intracranial metastases.  2.  Sinus disease       Rt axillary LN bx at outside facility- on 1/16/2018 at [facility]  Pathology revealed metastatic poorly differentiated carcinoma of unknown primary  site 1/16/2018 at [facility]  TTF-1 negative, napsin negative  , cytokeratin 7 positive, cytokeratin 20 negative, p63 negative, cytokeratin 5/6 focal dim positivity    LUNG BIOPSY 1/31/2018  Pathology revealed benign lung tissue         SPECIMEN  1) Right Lung Bx 2/14/2018  Supplemental Diagnosis  Immunohistochemical stains show strong nuclear staining for p63 in essentially all tumor cells and very strong  cytoplasmic and membrane staining for CK5/6, also in essentially all tumor cells. TTF-1 and CK7 are negative  within tumor cells but do stain native pulmonary elements present within the biopsy. A stain for mucicarmine is  negative. Positive and negative controls function appropriately.  Final diagnosis: Specimen submitted as right lung biopsy  -Squamous cell carcinoma  (Electronically Signed: 2018-02-20 09:12:07 )  Diagnosed by: Phong Thompson  FINAL PATHOLOGIC DIAGNOSIS  Fragments of pulmonary parenchyma (submitted as right lung biopsy):    FINAL PATHOLOGIC DIAGNOSIS 1. Lymph node, right axilla, biopsy, review of 10 outside slides [facility], JS 18 1 [id number],  collected January 11, 2018: Metastatic poorly differentiated carcinoma (see comment).  The histologic section shows fibrous stroma infiltrated by nest of poorly  differentiated malignant cells including  occasional dyskeratotic cells morphologically suggestive of metastatic squamous cell carcinoma.  Immunohistochemical stains are nonspecific; the cells are positive for cytokeratin 7 and cytokeratin 5/6 (focal) and  negative for cytokeratin 20, TTF-1, p63, GCDFP, mammoglobin, and CEA        PET/CT 2/21/2019  Increased size and irregularity of the right axillary lymph node with increased FDG avidity.  Although this would be atypical in location for lung carcinoma, the increased size and avidity must be concerning for metastatic disease in this patient with history of squamous cell lung cancer.     US Rt breast and mammo 2/26/2019  Impression:  Right  Lymph Node: Right axilla lymph node. Assessment: 4 - Suspicious finding. Biopsy is recommended.      BI-RADS Category:   Right: 4 - Suspicious  Overall: 4 - Suspicious     Recommendation:  Biopsy is recommended. Biopsy of the lymph node measuring 1.4 x 1.1 x 1.3 recommended , the one with the round shape configuration, corresponding to the most recent PET CT finding.         Pathology 3/11/2019   FINAL PATHOLOGIC DIAGNOSIS  Right axilla, mass, core biopsy:  Positive for poorly differentiated carcinoma.  See comment.  Comment:  The biopsy from the right axilla mass shows a poorly differentiated carcinoma in background lymphoid tissue  with enlarged cells showing increased nuclear size, prominent nucleoli, moderate amounts of cytoplasm and mitotic  figures. Immunostains are completed and reveal the tumor cells to stain positively with cytokeratin AE 1/AE3, CK  5/6 and CK 7. The tumor cells are negative for CK 20, Estrogen receptor, p63 and TTF-1. All stains have  satisfactory positive and negative controls. The patient's prior cases will be reviewed and an additional stain for  JUAREZ-3 is pending in an attempt to pinpoint the primary site of this malignancy and results will follow in a  supplemental report.      Supplemental  Diagnosis  3/11/2019  The current axillary mass is compared to the patient's prior right lung biopsy (case number [id number]) and the  current axillary mass is morphologically similar to the lung malignancy. Also, the current axillary mass is compared  to the patient's prior breast resection (Case number [id number]) and appears similar as well. P63 is negative in the  [id number] tumor and CK 5/6 shows scattered positive staining in the [id number] tumor.  Immunostain for JUAREZ-3 is completed and shows only rare weak to moderate staining within the tumor cell nuclei  with satisfactory positive and negative controls. This stain is positive in the surrounding lymphocytes. This staining  pattern is non-specific and does not definitively differentiate between a lung and breast primary malignancy in this  right axilla mass biopsy. The staining profile of the current axillary mass match more closely with the previous  breast carcinoma (due to the p63 negativity in both the 2014 breast cancer and the current axillary mass lesion but  p63 positivity in the lung mass from 2018).  This staining profile, together with the comparison with the patient's prior breast tumor and prior lung tumor supports  a diagnosis of poorly differentiated carcinoma and the malignancy appears morphologically similar to the prior  malignancies from both sites, but the staining profile in this small sample from the axillary mass more closely  correlates with the prior breast malignancy. Pathologic subclassification of this malignancy's primary location is not  definitive and clinical correlation is recommended definitively decide on possible primary location in this patient's  axillary mass sample.  Supplemental (2):  Additional immunohistochemical staining for progesterone receptor and HER2 are completed per clinician request  with following results:  Progesterone receptor: Negative; 0% nuclear tumor cell staining.  HER2: Negative; stain score =  0.  Supplemental (3):  [place] DIAGNOSIS:  FINAL DIAGNOSIS  Breast, right, needle core biopsy ([id number]; 06/17/2014): Invasive ductal carcinoma, Heth grade III (of  III), with focal micropapillary features.  Immunohistochemical stains performed at the referring institution show that the tumor cells have the following  phenotype: - Estrogen receptor: Negative (0% tumor cells staining). - Progesterone receptor: Negative (0% tumor  cells staining). - HER2: Negative (score 0).  Lymph nodes, right, sentinel biopsy and lumpectomy ([id number]; 07/11/2014):  1. Right sentinel lymph node: A single (1) lymph node is negative for metastatic carcinoma.  Immunohistochemical stains performed by the referring institution and reviewed at [facility] for cytokeratin are  negative, confirming the diagnosis.  2. Right breast: Invasive ductal carcinoma with micropapillary features, per report 11.5 mm in greatest dimension.  Foci suspicious for lymphovascular invasion identified. Margins of resection are free of tumor.  Immunohistochemical stains performed by the referring institution and reviewed at [facility] show that the tumor  cells are negative for p63 and show patchy positivity for CK 5/6.  Lung, right, needle core biopsy ([id number]; 02/14/2018): Squamous cell carcinoma.    Immunohistochemical stains performed by the referring institution show the tumor cells are strongly positive for p63  and CK 5/6, while negative for TTF-1 and CK7. These result support the diagnosis. Axilla, right, needle core biopsy  ([id number]; 03/11/2019): Lymph node positive for metastatic carcinoma, most consistent with breast primary  (see comment).  Immunohistochemical stains performed by the referring institution and reviewed at [facility] show that the tumor  cells are negative for p63, focally positive for CK 5/6, focally and weakly positive for JUAREZ-3, and strongly positive  for CK7. They are also negative for estrogen receptor,  progesterone receptor, and HER2 JAM (score 0).  COMMENT:  Thank you for allowing me to review the case of this 73-year-old lady who recently underwent needle core biopsies  of a right axillary mass and who has a previous diagnosis of an invasive ductal carcinoma of the right breast, as well  as a squamous cell carcinoma of the lung. I am reviewing this case because of my special interest in breast  pathology.  I agree with your interpretation of this case. In my opinion, the lung tumor corresponds to a squamous cell  carcinoma and appears to be unrelated to the invasive ductal carcinoma of the breast. The axillary mass, in my  opinion, corresponds to metastases of the breast primary. Although it is a poorly differentiated carcinoma, the  immunophenotype is most consistent with the one that the breast primary exhibited, and is inconsistent with a  metastatic squamous cell carcinoma. I did share the case with Dr. Anabel Stone, one of our pulmonary pathologists,  and she concurs with this interpretation.  Note: Report attached.  Performing location:  [place] [address]    CT neck/chest/abd/pelvis w/contrast 4/26/2019   The previously described right hilar mass is no longer visible.  There is persistent volume loss in the right middle lobe this appears similar to the prior PET-CT February 21, 2019.    Persistent abnormal right axillary node today measuring about 15 mm compared 18 mm prior.  The positioning of the adjacent nodes is slightly different likely accounting for the slight difference in measurement.    Cluster of tree-in-bud nodular densities left lower lobe series 2, image 93.  This may be related to small airways disease though serial follow-up suggested.      PET/CT 6/20/2019   New and worsening hypermetabolic lymph nodes concerning for metastatic breast cancer as described above.  Tissue sampling would be required for definitive  diagnosis.      2-D echo 6/27/2019   · Normal left ventricular systolic function. The estimated ejection fraction is 55%  · Concentric left ventricular remodeling.  · Normal LV diastolic function.  · Normal right ventricular systolic function.  · Moderate left atrial enlargement.  · Mild right atrial enlargement.  · Mild aortic regurgitation.  · Mild mitral regurgitation.  · Mild tricuspid regurgitation.  · Normal central venous pressure (3 mm Hg).  · The estimated PA systolic pressure is 25 mm Hg      PET/CT 9/19/2019   Favorable response to therapy with interval decrease in size and uptake of several right axillary lymph nodes and a supraclavicular lymph node.  Mild residual activity may indicate viable tumor.    No new hypermetabolic findings worrisome for malignancy.    PET/CT 2/28/2020  1.  No evidence of hypermetabolic tumor.  Continued improvement of the right axilla status post adjuvant radiation.    2.  No new hypermetabolic lesions      CTA 6/17/2020  1. No evidence of pulmonary embolus. No aortic dissection.   2. There is no evidence for new or progressive disease in the chest as compared to PET/CT 6/20/2019 in this patient with history of right breast cancer and right lung neoplasm. The patient does have a more recent PET/CT 2/28/2020 from an outside facility per the electronic medical record although images are not available for direct comparison. Correlation with these images may be beneficial. Continued long-term surveillance recommended.  3. Stable appearance of the treated tumor in the right hilum/middle lobe.  4. Stable treated right breast cancer and axillary adenopathy without evidence for developing breast mass or new right axillary adenopathy.  5. Stable tiny nodularity/tree-in-bud densities in the left lung, probably postinfectious or inflammatory.  6. Wall thickening of the esophagus may be correlated for esophagitis. Possible tiny hiatus hernia.  7. Slight bronchial wall thickening may be  correlated for bronchitis.  8. Mild to moderate emphysema as before.  9. Reflux of contrast into the IVC and hepatic veins is nonspecific but may be correlated for elevated right heart pressures.  10. Coronary calcifications and/or stents similar to prior.    Echo 5/21/2020  Technically difficult study.   Normal left ventricular systolic function.   Left ventricular ejection fraction is estimated at 55%.   Severe mitral annular calcification.   Mild mitral valve regurgitation.   Aortic valve sclerosis without stenosis or regurgitation.     PFTs 6/17/2020  Spirometry reveals a very severe obstructive lung defect, which does not significantly improve post bronchodilators. Lung volumes revealed air trapping. Diffusing capacity was moderately impaired.        Del 6/26/2020  BI-RADS Category:   Overall: 2 - Benign    Assessment:       1. Recurrent breast cancer, unspecified laterality    2.  History of Squamous cell carcinoma of right lung    3. Other nonspecific abnormal finding of lung field     4. Chronic obstructive pulmonary disease, unspecified COPD type    5. SUNITHA (obstructive sleep apnea)    6. Cough        Plan:     1-6    Pt with hx of Stage 1A invasive ductal carcinoma rt breast s/p rt segmental mastectomy and SLN bx 7/11/2014 ER/TX neg HER 2 jose maria neg [id number]([id number]).  S/p adjuvant RT completed 9/2014 Pt declined adjuvant chemo  Follow-up Mammo 6/12/2017 No mammographic evidence of malignancy.  Pt  hospitalized 11/2017 with acute-on-chronic  resp failure  Abnormal CT imaging revealing Large right hilar mass with involvement of  adjacent segmental pulmonary arterial branches and bronchi with associated postobstructive atelectasis  and involvement of mediastinal and axillary adenopathy   S/p rt axillary LN bx ( outside facility) - metastatic poorly differentiated carcinoma of unknown primary  site  status post  lung biopsy 1/31/2017 -benign lung tissue  PET/CT 1/26/2018   Intense FDG uptake within the known right  infrahilar lung mass, compatible with malignancy.   Intensely hypermetabolic right axillary, retropectoral, and lower right cervical lymph nodes, compatible with metastases.  Abnormal FDG uptake along right breast skin thickening, new from prior chest CTA and mammogram.    Repeat lung bx 2/14/2018 revealed Squamous Cell CA lung  PD-L1 10% low expression  EGFR NEG  ALK NEG    Pt treated for advanced squamous cell CA of lung   She s/p  cycle 6 of carboplatin/Abraxane Day1 completed 7/2/2018 ( day 15 held due to prolonged cytopenias)  She completed therapy with near CR     PET/CT 2/21/2019 showed increased size and irregularity of the right axillary lymph node with increased FDG avidity    t MAMMO/US rt breast 3/2019  reveals suspicious findings rt axilla LN.  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 It was determined The axillary mass, in my opinion, corresponded  to metastases of the breast primary. Although it is a poorly differentiated carcinoma, theimmunophenotype is most consistent with the one that the breast primary exhibited, and is inconsistent with a metastatic squamous cell carcinoma.     CT imaging 4/26/2019 persistent rt axillary LAD, no other sites of disease     Pf followed by Rad/Onc regarding RT     Lymph node biopsy 3/11/2019 Right axilla, mass, core biopsy:  Positive for poorly differentiated carcinoma.   favor breast primary   Pt was discussed at multidisciplinary tumor board  D/w Pathologist    PET/CT 6/20/2019  New and worsening hypermetabolic lymph nodes concerning for metastatic breast cancer     Previously  Discussed  imaging findings in detail with patient which reveals new and worsening hypermetabolic melena metabolic lymph nodes including hypermetabolic supraclavicular node.  Therefore, at treatment option will be systemic chemotherapy in light of the recent imaging findings.  She will be followed by her surgical oncologist Dr. Christopher      IT was determined to proceed with systemic chemotherapy   S/p  AC [id number] x 3 wks x 4 wks completed 9/6/2019   ( pt has not received in past)      PET/CT 9/19/2019 shows disease response with interval decrease in size and uptake of several right axillary lymph nodes and a supraclavicular lymph node.  Mild residual activity may indicate viable tumor.  No new hypermetabolic findings worrisome for malignancy.    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   She completed chemotherapy 9/6/2019  and has had a near complete response.  It was determined  Radiation will be given to the original areas of hypermetabolic activity in the right axilla and right supraclavicular region  Pt was presented at multidisciplinary tumor board    Pt completed  RT 12/16/2019      PET/CT 2/28/2020  No evidence of hypermetabolic tumor.  Continued improvement of the right axilla status post adjuvant radiation.    No new hypermetabolic lesions     Cough   Pulmonology workup as above  PFTs reveals severe obstructive defect   PPI added  Pulmonary rehab       Plan follow-up PET/CT prior to follow-up    Follow-up  1month with cbc,cmp prior to f/u     Advance Care Planning     Power of   I previously  initiated the process of advance care planning today and explained the importance of this process to the patient.  I introduced the concept of advance directives to the patient, as well. Then the patient received detailed information about the importance of designating a Health Care Power of  (HCPOA).  She was also instructed to communicate with this person about their wishes for future healthcare, should she become sick and lose decision-making capacity. The patient has previously appointed a HCPOA. After our discussion, the patient has decided to complete a HCPOA and has appointed her son and niece and  I spent a total time of 16 minutes discussing this issue with the patient.         Cc: Swetha Katz M.D.         MD Tory Roberson MD Raymond Gould, MD

## 2020-07-24 ENCOUNTER — HOSPITAL ENCOUNTER (OUTPATIENT)
Dept: CARDIOLOGY | Facility: HOSPITAL | Age: 75
Discharge: HOME | End: 2020-07-24
Attending: INTERNAL MEDICINE
Payer: MEDICARE

## 2020-07-24 ENCOUNTER — HOSPITAL ENCOUNTER (OUTPATIENT)
Dept: RADIOLOGY | Facility: HOSPITAL | Age: 75
Discharge: HOME | End: 2020-07-24
Attending: INTERNAL MEDICINE
Payer: MEDICARE

## 2020-07-24 VITALS — WEIGHT: 179 LBS | BODY MASS INDEX: 31.71 KG/M2 | HEIGHT: 63 IN

## 2020-07-24 DIAGNOSIS — R06.02 SOB (SHORTNESS OF BREATH): ICD-10-CM

## 2020-07-24 DIAGNOSIS — R06.09 DOE (DYSPNEA ON EXERTION): ICD-10-CM

## 2020-07-24 LAB
AORTIC ROOT ANNULUS: 2.61 CM
AORTIC VALVE CUSP SEPERATION: 1.51 CM
ASCENDING AORTA: 2.56 CM
AV INDEX (PROSTH): 0.55
AV MEAN GRADIENT: 8 MMHG
AV PEAK GRADIENT: 14 MMHG
AV VALVE AREA: 1.57 CM2
AV VELOCITY RATIO: 0.51
BSA FOR ECHO PROCEDURE: 1.9 M2
CV ECHO LV RWT: 0.4 CM
CV STRESS BASE HR: 53 BPM
DIASTOLIC BLOOD PRESSURE: 74 MMHG
DOP CALC AO PEAK VEL: 1.87 M/S
DOP CALC AO VTI: 47.15 CM
DOP CALC LVOT AREA: 2.9 CM2
DOP CALC LVOT DIAMETER: 1.91 CM
DOP CALC LVOT PEAK VEL: 0.96 M/S
DOP CALC LVOT STROKE VOLUME: 74.11 CM3
DOP CALCLVOT PEAK VEL VTI: 25.88 CM
E WAVE DECELERATION TIME: 321.05 MSEC
E/A RATIO: 0.9
E/E' RATIO: 14.5 M/S
ECHO LV POSTERIOR WALL: 0.92 CM (ref 0.6–1.1)
FRACTIONAL SHORTENING: 28 % (ref 28–44)
INTERVENTRICULAR SEPTUM: 0.88 CM (ref 0.6–1.1)
IVRT: 105.61 MSEC
LA MAJOR: 5.2 CM
LA MINOR: 5.23 CM
LA WIDTH: 4 CM
LEFT ATRIUM SIZE: 3.87 CM
LEFT ATRIUM VOLUME INDEX: 37.2 ML/M2
LEFT ATRIUM VOLUME: 68.62 CM3
LEFT INTERNAL DIMENSION IN SYSTOLE: 3.35 CM (ref 2.1–4)
LEFT VENTRICLE DIASTOLIC VOLUME INDEX: 53.8 ML/M2
LEFT VENTRICLE DIASTOLIC VOLUME: 99.24 ML
LEFT VENTRICLE MASS INDEX: 76 G/M2
LEFT VENTRICLE SYSTOLIC VOLUME INDEX: 24.8 ML/M2
LEFT VENTRICLE SYSTOLIC VOLUME: 45.69 ML
LEFT VENTRICULAR INTERNAL DIMENSION IN DIASTOLE: 4.64 CM (ref 3.5–6)
LEFT VENTRICULAR MASS: 139.7 G
LV LATERAL E/E' RATIO: 12.43 M/S
LV SEPTAL E/E' RATIO: 17.4 M/S
MV PEAK A VEL: 0.97 M/S
MV PEAK E VEL: 0.87 M/S
MV STENOSIS PRESSURE HALF TIME: 93.11 MS
MV VALVE AREA P 1/2 METHOD: 2.36 CM2
NUC REST EJECTION FRACTION: 71
OHS CV CPX 85 PERCENT MAX PREDICTED HEART RATE MALE: 120
OHS CV CPX MAX PREDICTED HEART RATE: 142
OHS CV CPX PATIENT IS FEMALE: 1
OHS CV CPX PATIENT IS MALE: 0
OHS CV CPX PEAK DIASTOLIC BLOOD PRESSURE: 65 MMHG
OHS CV CPX PEAK HEAR RATE: 78 BPM
OHS CV CPX PEAK RATE PRESSURE PRODUCT: 8190
OHS CV CPX PEAK SYSTOLIC BLOOD PRESSURE: 105 MMHG
OHS CV CPX PERCENT MAX PREDICTED HEART RATE ACHIEVED: 55
OHS CV CPX RATE PRESSURE PRODUCT PRESENTING: 6890
PISA TR MAX VEL: 2.33 M/S
PULM VEIN S/D RATIO: 1.2
PV PEAK D VEL: 0.5 M/S
PV PEAK S VEL: 0.6 M/S
PV PEAK VELOCITY: 0.83 CM/S
RA MAJOR: 5.19 CM
RA PRESSURE: 3 MMHG
RA WIDTH: 2.76 CM
RIGHT VENTRICULAR END-DIASTOLIC DIMENSION: 2.68 CM
SINUS: 3 CM
STJ: 2.34 CM
SYSTOLIC BLOOD PRESSURE: 130 MMHG
TDI LATERAL: 0.07 M/S
TDI SEPTAL: 0.05 M/S
TDI: 0.06 M/S
TR MAX PG: 22 MMHG
TRICUSPID ANNULAR PLANE SYSTOLIC EXCURSION: 2.04 CM
TV REST PULMONARY ARTERY PRESSURE: 25 MMHG

## 2020-07-24 PROCEDURE — 78452 STRESS TEST WITH MYOCARDIAL PERFUSION (CUPID ONLY): ICD-10-PCS | Mod: 26,HCNC,, | Performed by: INTERNAL MEDICINE

## 2020-07-24 PROCEDURE — 93016 STRESS TEST WITH MYOCARDIAL PERFUSION (CUPID ONLY): ICD-10-PCS | Mod: HCNC,,, | Performed by: INTERNAL MEDICINE

## 2020-07-24 PROCEDURE — 93227 XTRNL ECG REC<48 HR R&I: CPT | Mod: HCNC,,, | Performed by: INTERNAL MEDICINE

## 2020-07-24 PROCEDURE — 93226 XTRNL ECG REC<48 HR SCAN A/R: CPT | Mod: HCNC

## 2020-07-24 PROCEDURE — 78452 HT MUSCLE IMAGE SPECT MULT: CPT | Mod: 26,HCNC,, | Performed by: INTERNAL MEDICINE

## 2020-07-24 PROCEDURE — 63600175 PHARM REV CODE 636 W HCPCS: Mod: HCNC | Performed by: INTERNAL MEDICINE

## 2020-07-24 PROCEDURE — 93018 CV STRESS TEST I&R ONLY: CPT | Mod: HCNC,,, | Performed by: INTERNAL MEDICINE

## 2020-07-24 PROCEDURE — A9502 TC99M TETROFOSMIN: HCPCS | Mod: HCNC

## 2020-07-24 PROCEDURE — 93227 HOLTER MONITOR - 48 HOUR (CUPID ONLY): ICD-10-PCS | Mod: HCNC,,, | Performed by: INTERNAL MEDICINE

## 2020-07-24 PROCEDURE — 93016 CV STRESS TEST SUPVJ ONLY: CPT | Mod: HCNC,,, | Performed by: INTERNAL MEDICINE

## 2020-07-24 PROCEDURE — 93306 TTE W/DOPPLER COMPLETE: CPT | Mod: 26,HCNC,, | Performed by: INTERNAL MEDICINE

## 2020-07-24 PROCEDURE — 93306 ECHO (CUPID ONLY): ICD-10-PCS | Mod: 26,HCNC,, | Performed by: INTERNAL MEDICINE

## 2020-07-24 PROCEDURE — 93017 CV STRESS TEST TRACING ONLY: CPT | Mod: HCNC

## 2020-07-24 PROCEDURE — 93306 TTE W/DOPPLER COMPLETE: CPT | Mod: HCNC

## 2020-07-24 PROCEDURE — 93018 STRESS TEST WITH MYOCARDIAL PERFUSION (CUPID ONLY): ICD-10-PCS | Mod: HCNC,,, | Performed by: INTERNAL MEDICINE

## 2020-07-24 RX ORDER — REGADENOSON 0.08 MG/ML
0.4 INJECTION, SOLUTION INTRAVENOUS ONCE
Status: COMPLETED | OUTPATIENT
Start: 2020-07-24 | End: 2020-07-24

## 2020-07-24 RX ADMIN — REGADENOSON 0.4 MG: 0.08 INJECTION, SOLUTION INTRAVENOUS at 08:07

## 2020-07-29 LAB
OHS CV EVENT MONITOR DAY: 2
OHS CV HOLTER LENGTH DECIMAL HOURS: 96
OHS CV HOLTER LENGTH HOURS: 48
OHS CV HOLTER LENGTH MINUTES: 0

## 2020-08-04 ENCOUNTER — PATIENT OUTREACH (OUTPATIENT)
Dept: ADMINISTRATIVE | Facility: OTHER | Age: 75
End: 2020-08-04

## 2020-08-04 NOTE — PROGRESS NOTES
Requested updates within Care Everywhere.  Patient's chart was reviewed for overdue DONLAD topics.  Immunizations reconciled.

## 2020-08-05 ENCOUNTER — OFFICE VISIT (OUTPATIENT)
Dept: CARDIOLOGY | Facility: CLINIC | Age: 75
End: 2020-08-05
Payer: MEDICARE

## 2020-08-05 VITALS
SYSTOLIC BLOOD PRESSURE: 110 MMHG | DIASTOLIC BLOOD PRESSURE: 76 MMHG | BODY MASS INDEX: 31.24 KG/M2 | HEART RATE: 67 BPM | OXYGEN SATURATION: 95 % | RESPIRATION RATE: 16 BRPM | WEIGHT: 176.38 LBS

## 2020-08-05 DIAGNOSIS — R06.09 DOE (DYSPNEA ON EXERTION): Primary | ICD-10-CM

## 2020-08-05 DIAGNOSIS — I70.0 ATHEROSCLEROSIS OF AORTA: Chronic | ICD-10-CM

## 2020-08-05 DIAGNOSIS — I20.89 STABLE ANGINA: Chronic | ICD-10-CM

## 2020-08-05 DIAGNOSIS — I25.10 CORONARY ARTERY DISEASE INVOLVING NATIVE CORONARY ARTERY OF NATIVE HEART WITHOUT ANGINA PECTORIS: Chronic | ICD-10-CM

## 2020-08-05 DIAGNOSIS — I25.2 OLD MYOCARDIAL INFARCTION: ICD-10-CM

## 2020-08-05 DIAGNOSIS — E78.5 HYPERLIPIDEMIA LDL GOAL <70: Chronic | ICD-10-CM

## 2020-08-05 DIAGNOSIS — J44.9 CHRONIC OBSTRUCTIVE PULMONARY DISEASE, UNSPECIFIED COPD TYPE: Chronic | ICD-10-CM

## 2020-08-05 DIAGNOSIS — I11.9 BENIGN HYPERTENSIVE HEART DISEASE WITHOUT HEART FAILURE: Chronic | ICD-10-CM

## 2020-08-05 DIAGNOSIS — I50.32 CHRONIC HEART FAILURE WITH PRESERVED EJECTION FRACTION: ICD-10-CM

## 2020-08-05 DIAGNOSIS — I70.0 AORTIC ARCH ATHEROSCLEROSIS: ICD-10-CM

## 2020-08-05 DIAGNOSIS — R94.39 ABNORMAL STRESS TEST: ICD-10-CM

## 2020-08-05 PROCEDURE — 3078F PR MOST RECENT DIASTOLIC BLOOD PRESSURE < 80 MM HG: ICD-10-PCS | Mod: HCNC,CPTII,S$GLB, | Performed by: INTERNAL MEDICINE

## 2020-08-05 PROCEDURE — 1159F MED LIST DOCD IN RCRD: CPT | Mod: HCNC,S$GLB,, | Performed by: INTERNAL MEDICINE

## 2020-08-05 PROCEDURE — 99999 PR PBB SHADOW E&M-EST. PATIENT-LVL III: CPT | Mod: PBBFAC,HCNC,, | Performed by: INTERNAL MEDICINE

## 2020-08-05 PROCEDURE — 99499 UNLISTED E&M SERVICE: CPT | Mod: HCNC,S$GLB,, | Performed by: INTERNAL MEDICINE

## 2020-08-05 PROCEDURE — 1101F PR PT FALLS ASSESS DOC 0-1 FALLS W/OUT INJ PAST YR: ICD-10-PCS | Mod: HCNC,CPTII,S$GLB, | Performed by: INTERNAL MEDICINE

## 2020-08-05 PROCEDURE — 3008F PR BODY MASS INDEX (BMI) DOCUMENTED: ICD-10-PCS | Mod: HCNC,CPTII,S$GLB, | Performed by: INTERNAL MEDICINE

## 2020-08-05 PROCEDURE — 99215 PR OFFICE/OUTPT VISIT, EST, LEVL V, 40-54 MIN: ICD-10-PCS | Mod: HCNC,S$GLB,, | Performed by: INTERNAL MEDICINE

## 2020-08-05 PROCEDURE — 3078F DIAST BP <80 MM HG: CPT | Mod: HCNC,CPTII,S$GLB, | Performed by: INTERNAL MEDICINE

## 2020-08-05 PROCEDURE — 3074F SYST BP LT 130 MM HG: CPT | Mod: HCNC,CPTII,S$GLB, | Performed by: INTERNAL MEDICINE

## 2020-08-05 PROCEDURE — 1101F PT FALLS ASSESS-DOCD LE1/YR: CPT | Mod: HCNC,CPTII,S$GLB, | Performed by: INTERNAL MEDICINE

## 2020-08-05 PROCEDURE — 3074F PR MOST RECENT SYSTOLIC BLOOD PRESSURE < 130 MM HG: ICD-10-PCS | Mod: HCNC,CPTII,S$GLB, | Performed by: INTERNAL MEDICINE

## 2020-08-05 PROCEDURE — 3008F BODY MASS INDEX DOCD: CPT | Mod: HCNC,CPTII,S$GLB, | Performed by: INTERNAL MEDICINE

## 2020-08-05 PROCEDURE — 1126F AMNT PAIN NOTED NONE PRSNT: CPT | Mod: HCNC,S$GLB,, | Performed by: INTERNAL MEDICINE

## 2020-08-05 PROCEDURE — 99999 PR PBB SHADOW E&M-EST. PATIENT-LVL III: ICD-10-PCS | Mod: PBBFAC,HCNC,, | Performed by: INTERNAL MEDICINE

## 2020-08-05 PROCEDURE — 99499 RISK ADDL DX/OHS AUDIT: ICD-10-PCS | Mod: HCNC,S$GLB,, | Performed by: INTERNAL MEDICINE

## 2020-08-05 PROCEDURE — 1159F PR MEDICATION LIST DOCUMENTED IN MEDICAL RECORD: ICD-10-PCS | Mod: HCNC,S$GLB,, | Performed by: INTERNAL MEDICINE

## 2020-08-05 PROCEDURE — 99215 OFFICE O/P EST HI 40 MIN: CPT | Mod: HCNC,S$GLB,, | Performed by: INTERNAL MEDICINE

## 2020-08-05 PROCEDURE — 1126F PR PAIN SEVERITY QUANTIFIED, NO PAIN PRESENT: ICD-10-PCS | Mod: HCNC,S$GLB,, | Performed by: INTERNAL MEDICINE

## 2020-08-05 RX ORDER — NAPROXEN SODIUM 220 MG/1
81 TABLET, FILM COATED ORAL ONCE
Status: CANCELLED | OUTPATIENT
Start: 2020-08-05 | End: 2020-08-05

## 2020-08-05 RX ORDER — SODIUM CHLORIDE 9 MG/ML
INJECTION, SOLUTION INTRAVENOUS CONTINUOUS
Status: CANCELLED | OUTPATIENT
Start: 2020-08-05

## 2020-08-05 RX ORDER — CLOPIDOGREL 300 MG/1
600 TABLET, FILM COATED ORAL ONCE
Status: CANCELLED | OUTPATIENT
Start: 2020-08-13

## 2020-08-05 NOTE — H&P (VIEW-ONLY)
CARDIOVASCULAR CONSULTATION    REASON FOR CONSULT:   Ade Castellano is a 74 y.o. female who presents for preoperative risk assessment.      HISTORY OF PRESENT ILLNESS:     Patient is a pleasant 74-year-old lady.  Here for preoperative risk assessment prior to lymph node resection.  She has a past medical history significant for coronary artery disease status post PCI in 2006.  Since then has been angina free.  Had a stress test performed in October of 2018 which was normal.  Denies any anginal sounding chest pains, orthopnea, PND.  Does have mild dyspnea on exertion, which she states has actually been improving since her stress test in 2018.  Denies any other cardiac anginal symptoms.    Notes from July 2020:  Patient here for follow-up.  States that underwent radiation therapy for her lymph nodes.  Has been told that her cancer is gone now.  States that lately has been noticing severe dyspnea on exertion.  Can only walk 10 ft and then has to stop.  Also has dry cough all the time.  Not related to postural or activity.  Denies any chest tightness but does have severe dyspnea on exertion.  Denies any orthopnea, PND.      Notes from August 2020:  Patient here for follow-up.  Stress test was abnormal.      The study shows abnormal myocardial perfusion.    Abnormal myocardial perfusion study.    Perfusion Defect There is a mild to moderate intensity, small to moderate sized, mostly reversible with some fixed areas defect in the anteroapical wall(s).    Gated perfusion images showed an ejection fraction of 71%    There is normal wall motion at rest and post stress.    The EKG portion of this study is negative for ischemia.    The patient reported no chest pain during the stress test.    There were no arrhythmias during stress.    · The diary was not returned.  · NSR. Heart rates varied between 47 and 89 bpm with an average of 58 bpm.  · Rare PACs / PVCs     · Normal left ventricular systolic function. The  estimated ejection fraction is 60%.  · Grade II (moderate) left ventricular diastolic dysfunction consistent with pseudonormalization.  · Normal right ventricular systolic function.  · Mild to moderate left atrial enlargement.  · Trivial pericardial effusion.  · The estimated PA systolic pressure is 25 mmHg.  · Normal central venous pressure (3 mmHg).      PAST MEDICAL HISTORY:     Past Medical History:   Diagnosis Date    Acute respiratory failure with hypoxia and hypercapnia 11/29/2017    Angina pectoris     Arthritis     Bell's palsy     left facial weakness    Breast cancer     RIGHT    CAD (coronary artery disease)     Cervical cancer     Chronic bronchitis     COPD (chronic obstructive pulmonary disease)     Dr. Sterling Krueger bridge present     Emphysema of lung     H/O colonoscopy 06/2017    due for repeat colonsocopy in 6/2018    History of Bell's palsy     History of heart artery stent     Dr. Ortiz  x2 stents    Hyperlipidemia     Hypertension     Lung cancer     Myocardial infarction     SUNITHA (obstructive sleep apnea)     intolerant to mask    SUNITHA (obstructive sleep apnea)     intolerant to mask     Pneumonia     Pneumonia due to other staphylococcus     PUD (peptic ulcer disease)     Severe anemia 6/11/2018    Sleep apnea     Vaginal delivery     x1       PAST SURGICAL HISTORY:     Past Surgical History:   Procedure Laterality Date    ADENOIDECTOMY      BREAST BIOPSY Right     x3    BREAST LUMPECTOMY      BREAST SURGERY      lumpectomy right side     CERVIX SURGERY      cone    COLONOSCOPY N/A 3/17/2017    Procedure: COLONOSCOPY;  Surgeon: Julio Rudd MD;  Location: [facility] ENDO;  Service: Endoscopy;  Laterality: N/A;    COLONOSCOPY N/A 6/30/2017    Procedure: COLONOSCOPY;  Surgeon: Julio Rudd MD;  Location: [facility] ENDO;  Service: Endoscopy;  Laterality: N/A;    COLONOSCOPY N/A 11/28/2018    Procedure: COLONOSCOPY;  Surgeon: Nura Urbina MD;  Location: [facility];   Service: Endoscopy;  Laterality: N/A;    COLONOSCOPY N/A 1/29/2019    Procedure: COLONOSCOPY;  Surgeon: Emmanuel Perez MD;  Location: [facility];  Service: Endoscopy;  Laterality: N/A;  confirmed appt-SP    EYE SURGERY Bilateral 06/08/2018    cataract     PORTACATH PLACEMENT Right 01/2018    sweat glands axillary regions Bilateral     TONSILLECTOMY         ALLERGIES AND MEDICATION:   Review of patient's allergies indicates:  No Known Allergies     Medication List          Accurate as of August 5, 2020  9:49 AM. If you have any questions, ask your nurse or doctor.            CONTINUE taking these medications    * albuterol 90 mcg/actuation inhaler  Commonly known as: VENTOLIN HFA  INHALE 2 PUFF(S) BY MOUTH EVERY 4 - 6 HOURS AS NEEDED FOR SHORTNESS OF BREATH or WHEEZING     * albuterol 2.5 mg /3 mL (0.083 %) nebulizer solution  Commonly known as: PROVENTIL  NEBULIZE 1 vial EVERY 6 HOURS AS NEEDED FOR WHEEZING     aspirin 325 MG EC tablet  Commonly known as: ECOTRIN     desloratadine 5 mg tablet  Commonly known as: CLARINEX  Take 1 tablet (5 mg total) by mouth once daily.     fluticasone propionate 50 mcg/actuation nasal spray  Commonly known as: FLONASE  USE TWO SPRAYS IN EACH NOSTRIL ONCE A DAY     influenza 180 mcg/0.5 mL vaccine  Commonly known as: FLUZONE HIGH-DOSE     isosorbide mononitrate 30 MG 24 hr tablet  Commonly known as: IMDUR  Take 1 tablet (30 mg total) by mouth once daily.     metoprolol tartrate 25 MG tablet  Commonly known as: LOPRESSOR  Take 1 tablet (25 mg total) by mouth 2 (two) times daily.     nitroGLYCERIN 0.4 MG SL tablet  Commonly known as: NITROSTAT  place 1 tablet under the tongue AS NEEDED. no more than 3 in 15 minutes     PAZEO 0.7 % Drop  Generic drug: olopatadine     rosuvastatin 10 MG tablet  Commonly known as: CRESTOR  Take 1 tablet (10 mg total) by mouth once daily.     TRELEGY ELLIPTA 100-62.5-25 mcg Dsdv  Generic drug: fluticasone-umeclidin-vilanter         * This list has 2  medication(s) that are the same as other medications prescribed for you. Read the directions carefully, and ask your doctor or other care provider to review them with you.                SOCIAL HISTORY:     Social History     Socioeconomic History    Marital status: Single     Spouse name: Not on file    Number of children: Not on file    Years of education: Not on file    Highest education level: Not on file   Occupational History    Not on file   Social Needs    Financial resource strain: Not on file    Food insecurity     Worry: Not on file     Inability: Not on file    Transportation needs     Medical: Not on file     Non-medical: Not on file   Tobacco Use    Smoking status: Former Smoker     Packs/day: 0.25     Years: 50.00     Pack years: 12.50     Types: Cigarettes     Quit date: 2017     Years since quittin.7    Smokeless tobacco: Never Used   Substance and Sexual Activity    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     Drug use: No    Sexual activity: Never   Lifestyle    Physical activity     Days per week: Not on file     Minutes per session: Not on file    Stress: Not at all   Relationships    Social connections     Talks on phone: Not on file     Gets together: Not on file     Attends [employer] service: Not on file     Active member of club or organization: Not on file     Attends meetings of clubs or organizations: Not on file     Relationship status: Not on file   Other Topics Concern    Not on file   Social History Narrative    Not on file       FAMILY HISTORY:     Family History   Problem Relation Age of Onset    Cancer Mother         breast    Heart disease Mother     Breast cancer Mother     Cancer Father         lung-smoker     Cancer Sister         lung-smoker     Heart attack Sister     Cancer Maternal Grandmother     Heart disease Maternal Grandmother     Cancer Maternal Grandfather     Heart disease Maternal Grandfather     Cancer Paternal Grandmother      Cancer Paternal Grandfather     Cancer Sister         mets not sure where it started     COPD Sister     Kidney cancer Son        REVIEW OF SYSTEMS:   Review of Systems   Constitution: Positive for malaise/fatigue.   HENT: Negative.    Eyes: Negative.    Cardiovascular: Positive for dyspnea on exertion.   Respiratory: Negative.    Endocrine: Negative.    Hematologic/Lymphatic: Negative.    Skin: Negative.    Musculoskeletal: Negative.    Gastrointestinal: Negative.    Genitourinary: Negative.    Neurological: Negative.    Psychiatric/Behavioral: Negative.    Allergic/Immunologic: Negative.        A 10 point review of systems was performed and all the pertinent positives have been mentioned. Rest of review of systems was negative.        PHYSICAL EXAM:     Vitals:    08/05/20 0928   BP: 110/76   Pulse: 67   Resp: 16    Body mass index is 31.24 kg/m².  Weight: 80 kg (176 lb 5.9 oz)         Physical Exam   Constitutional: She is oriented to person, place, and time. She appears well-developed and well-nourished.   HENT:   Head: Normocephalic.   Eyes: Pupils are equal, round, and reactive to light.   Neck: Normal range of motion. Neck supple.   Cardiovascular: Normal rate and regular rhythm.   Pulmonary/Chest: Effort normal and breath sounds normal.   Abdominal: Soft. Normal appearance and bowel sounds are normal. There is no abdominal tenderness.   Musculoskeletal: Normal range of motion.   Neurological: She is alert and oriented to person, place, and time.   Skin: Skin is warm.   Psychiatric: She has a normal mood and affect.         DATA:     Laboratory:  CBC:  Recent Labs   Lab 02/28/20  0720 06/16/20  0926 07/21/20  0745   WBC 4.50 6.85 5.13   Hemoglobin 13.0 13.9 13.0   Hematocrit 40.9 42.8 41.0   Platelets 179 217 253       CHEMISTRIES:  Recent Labs   Lab 08/16/19  0727 09/06/19  0734  02/28/20  0720 06/16/20  0926 07/21/20  0745   Glucose 137 H 138 H   < > 134 H 120 H 140 H   Sodium 143 142   < > 144 139  142   Potassium 4.4 3.6   < > 4.6 3.6 3.8   BUN, Bld 12 11   < > 14 19 14   Creatinine 0.8 0.8   < > 0.8 1.1 0.8   eGFR if African American >60 >60   < > >60 58 A >60   eGFR if non African American >60 >60   < > >60 50 A >60   Calcium 10.1 9.2   < > 9.7 9.2 9.3   Magnesium 1.8 1.8  --  1.8  --   --     < > = values in this interval not displayed.       CARDIAC BIOMARKERS:  Recent Labs   Lab 11/29/17  1153 06/11/18  1345 07/20/19  1249   Troponin I 0.193 H <0.006 0.012       COAGS:  Recent Labs   Lab 11/29/17  0304 02/09/18  1020 07/20/19  1249   INR 1.0 1.0 1.0       LIPIDS/LFTS:  Recent Labs   Lab 03/04/19  0728  02/03/20  0730 02/28/20  0720 06/16/20  0926 07/21/20  0745   Cholesterol 166  --  125  --   --   --    Triglycerides 192 H  --  117  --   --   --    HDL 56  --  48  --   --   --    LDL Cholesterol 71.6  --  53.6 L  --   --   --    Non-HDL Cholesterol 110  --  77  --   --   --    AST 18   < > 19 19 18 17   ALT 19   < > 20 22 24 15    < > = values in this interval not displayed.       Hemoglobin A1C   Date Value Ref Range Status   02/03/2020 6.1 (H) 4.0 - 5.6 % Final     Comment:     ADA Screening Guidelines:  5.7-6.4%  Consistent with prediabetes  >or=6.5%  Consistent with diabetes  High levels of fetal hemoglobin interfere with the HbA1C  assay. Heterozygous hemoglobin variants (HbS, HgC, etc)do  not significantly interfere with this assay.   However, presence of multiple variants may affect accuracy.     03/04/2019 6.1 (H) 4.0 - 5.6 % Final     Comment:     ADA Screening Guidelines:  5.7-6.4%  Consistent with prediabetes  >or=6.5%  Consistent with diabetes  High levels of fetal hemoglobin interfere with the HbA1C  assay. Heterozygous hemoglobin variants (HbS, HgC, etc)do  not significantly interfere with this assay.   However, presence of multiple variants may affect accuracy.     03/16/2018 5.8 (H) 4.0 - 5.6 % Final     Comment:     According to ADA guidelines, hemoglobin A1c <7.0% represents  optimal  control in non-pregnant diabetic patients. Different  metrics may apply to specific patient populations.   Standards of Medical Care in Diabetes-2016.  For the purpose of screening for the presence of diabetes:  <5.7%     Consistent with the absence of diabetes  5.7-6.4%  Consistent with increasing risk for diabetes   (prediabetes)  >or=6.5%  Consistent with diabetes  Currently, no consensus exists for use of hemoglobin A1c  for diagnosis of diabetes for children.  This Hemoglobin A1c assay has significant interference with fetal   hemoglobin   (HbF). The results are invalid for patients with abnormal amounts of   HbF,   including those with known Hereditary Persistence   of Fetal Hemoglobin. Heterozygous hemoglobin variants (HbAS, HbAC,   HbAD, HbAE, HbA2) do not significantly interfere with this assay;   however, presence of multiple variants in a sample may impact the %   interference.         TSH  Recent Labs   Lab 11/29/17  0304   TSH 0.726       The ASCVD Risk score (Rosario AVINA Jr., et al., 2013) failed to calculate for the following reasons:    The patient has a prior MI or stroke diagnosis           Cardiovascular Testing:    EKG: (personally reviewed tracing)  Sinus bradycardia      ASSESSMENT AND PLAN     Patient Active Problem List   Diagnosis    Benign hypertensive heart disease without heart failure    CAD (coronary artery disease)    History of breast cancer    Hyperlipidemia LDL goal <70    Old myocardial infarction    Stable angina    Atherosclerosis of aorta    Prediabetes    DNR (do not resuscitate)    COPD (chronic obstructive pulmonary disease)    Squamous cell carcinoma lung    History of lung cancer    Metastatic breast cancer    Aortic arch atherosclerosis     Patient with a past medical history of coronary artery disease. .  Past medical history significant for lung cancer.  Now significant dyspnea on exertion which has worsened over the past few months.  Can only walk 10 ft and  then has to stop because of shortness of breath.  The stress test is abnormal.  Her echo shows grade 2 diastolic dysfunction.  Her dyspnea on exertion is multifactorial with pulmonary abnormalities, abnormal stress test and diastolic dysfunction contributing to it.  Because of abnormal stress test we discussed doing a coronary angiogram versus doing medical management.  After detailed discussion decided to proceed with a coronary angiogram.    Risks, benefits and alternatives of the catheterization procedure were discussed with the patient.The risks of coronary angiography include but are not limited to: bleeding, infection, death, heart attack, arrhythmia, kidney injury or failure, potential need for dialysis, allergic reactions, stroke, need for emergency surgery, hematoma, pseudoaneurysm etc.  Should stenting be indicated, the patient has agreed to dual anti-platelet therapy for 1-consecutive year with a drug-eluting stent and a minimum of 1-month with the use of a bare metal stent. Additionally, pt is aware that non-compliance is likely to result in stent clotting with heart attack, heart failure, and/or death  The risks of moderate sedation include hypotension, respiratory depression, arrhythmias, bronchospasm, and death. Informed consent was obtained and the  patient is agreeable to proceed with the procedure. Consent was placed on the chart.      Heart failure with preserved ejection fraction    Squamous cell carcinoma of lung    Breast cancer    Aortic arch atherosclerosis        Follow-up in Cardiology Clinic after testing            Thank you very much for involving me in the care of your patient.  Please do not hesitate to contact me if there are any questions.      Guevara Skelton MD, FACC, [facility]  Interventional Cardiologist, [facility].           This note was dictated with the help of speech recognition software.  There might be un-intended errors and/or substitutions.

## 2020-08-05 NOTE — PROGRESS NOTES
CARDIOVASCULAR CONSULTATION    REASON FOR CONSULT:   Ade Castellano is a 74 y.o. female who presents for preoperative risk assessment.      HISTORY OF PRESENT ILLNESS:     Patient is a pleasant 74-year-old lady.  Here for preoperative risk assessment prior to lymph node resection.  She has a past medical history significant for coronary artery disease status post PCI in 2006.  Since then has been angina free.  Had a stress test performed in October of 2018 which was normal.  Denies any anginal sounding chest pains, orthopnea, PND.  Does have mild dyspnea on exertion, which she states has actually been improving since her stress test in 2018.  Denies any other cardiac anginal symptoms.    Notes from July 2020:  Patient here for follow-up.  States that underwent radiation therapy for her lymph nodes.  Has been told that her cancer is gone now.  States that lately has been noticing severe dyspnea on exertion.  Can only walk 10 ft and then has to stop.  Also has dry cough all the time.  Not related to postural or activity.  Denies any chest tightness but does have severe dyspnea on exertion.  Denies any orthopnea, PND.      Notes from August 2020:  Patient here for follow-up.  Stress test was abnormal.      The study shows abnormal myocardial perfusion.    Abnormal myocardial perfusion study.    Perfusion Defect There is a mild to moderate intensity, small to moderate sized, mostly reversible with some fixed areas defect in the anteroapical wall(s).    Gated perfusion images showed an ejection fraction of 71%    There is normal wall motion at rest and post stress.    The EKG portion of this study is negative for ischemia.    The patient reported no chest pain during the stress test.    There were no arrhythmias during stress.    · The diary was not returned.  · NSR. Heart rates varied between 47 and 89 bpm with an average of 58 bpm.  · Rare PACs / PVCs     · Normal left ventricular systolic function. The  estimated ejection fraction is 60%.  · Grade II (moderate) left ventricular diastolic dysfunction consistent with pseudonormalization.  · Normal right ventricular systolic function.  · Mild to moderate left atrial enlargement.  · Trivial pericardial effusion.  · The estimated PA systolic pressure is 25 mmHg.  · Normal central venous pressure (3 mmHg).      PAST MEDICAL HISTORY:     Past Medical History:   Diagnosis Date    Acute respiratory failure with hypoxia and hypercapnia 11/29/2017    Angina pectoris     Arthritis     Bell's palsy     left facial weakness    Breast cancer     RIGHT    CAD (coronary artery disease)     Cervical cancer     Chronic bronchitis     COPD (chronic obstructive pulmonary disease)     Dr. Sterling Krueger bridge present     Emphysema of lung     H/O colonoscopy 06/2017    due for repeat colonsocopy in 6/2018    History of Bell's palsy     History of heart artery stent     Dr. Ortiz  x2 stents    Hyperlipidemia     Hypertension     Lung cancer     Myocardial infarction     SUNITHA (obstructive sleep apnea)     intolerant to mask    SUNITHA (obstructive sleep apnea)     intolerant to mask     Pneumonia     Pneumonia due to other staphylococcus     PUD (peptic ulcer disease)     Severe anemia 6/11/2018    Sleep apnea     Vaginal delivery     x1       PAST SURGICAL HISTORY:     Past Surgical History:   Procedure Laterality Date    ADENOIDECTOMY      BREAST BIOPSY Right     x3    BREAST LUMPECTOMY      BREAST SURGERY      lumpectomy right side     CERVIX SURGERY      cone    COLONOSCOPY N/A 3/17/2017    Procedure: COLONOSCOPY;  Surgeon: Julio Rudd MD;  Location: [facility];  Service: Endoscopy;  Laterality: N/A;    COLONOSCOPY N/A 6/30/2017    Procedure: COLONOSCOPY;  Surgeon: Julio Rudd MD;  Location: [facility];  Service: Endoscopy;  Laterality: N/A;    COLONOSCOPY N/A 11/28/2018    Procedure: COLONOSCOPY;  Surgeon: Nura Urbina MD;  Location[facility];   Service: Endoscopy;  Laterality: N/A;    COLONOSCOPY N/A 1/29/2019    Procedure: COLONOSCOPY;  Surgeon: Emmanuel Perez MD;  Location: [facility];  Service: Endoscopy;  Laterality: N/A;  confirmed appt-SP    EYE SURGERY Bilateral 06/08/2018    cataract     PORTACATH PLACEMENT Right 01/2018    sweat glands axillary regions Bilateral     TONSILLECTOMY         ALLERGIES AND MEDICATION:   Review of patient's allergies indicates:  No Known Allergies     Medication List          Accurate as of August 5, 2020  9:49 AM. If you have any questions, ask your nurse or doctor.            CONTINUE taking these medications    * albuterol 90 mcg/actuation inhaler  Commonly known as: VENTOLIN HFA  INHALE 2 PUFF(S) BY MOUTH EVERY 4 - 6 HOURS AS NEEDED FOR SHORTNESS OF BREATH or WHEEZING     * albuterol 2.5 mg /3 mL (0.083 %) nebulizer solution  Commonly known as: PROVENTIL  NEBULIZE 1 vial EVERY 6 HOURS AS NEEDED FOR WHEEZING     aspirin 325 MG EC tablet  Commonly known as: ECOTRIN     desloratadine 5 mg tablet  Commonly known as: CLARINEX  Take 1 tablet (5 mg total) by mouth once daily.     fluticasone propionate 50 mcg/actuation nasal spray  Commonly known as: FLONASE  USE TWO SPRAYS IN EACH NOSTRIL ONCE A DAY     influenza 180 mcg/0.5 mL vaccine  Commonly known as: FLUZONE HIGH-DOSE     isosorbide mononitrate 30 MG 24 hr tablet  Commonly known as: IMDUR  Take 1 tablet (30 mg total) by mouth once daily.     metoprolol tartrate 25 MG tablet  Commonly known as: LOPRESSOR  Take 1 tablet (25 mg total) by mouth 2 (two) times daily.     nitroGLYCERIN 0.4 MG SL tablet  Commonly known as: NITROSTAT  place 1 tablet under the tongue AS NEEDED. no more than 3 in 15 minutes     PAZEO 0.7 % Drop  Generic drug: olopatadine     rosuvastatin 10 MG tablet  Commonly known as: CRESTOR  Take 1 tablet (10 mg total) by mouth once daily.     TRELEGY ELLIPTA 100-62.5-25 mcg Dsdv  Generic drug: fluticasone-umeclidin-vilanter         * This list has 2  medication(s) that are the same as other medications prescribed for you. Read the directions carefully, and ask your doctor or other care provider to review them with you.                SOCIAL HISTORY:     Social History     Socioeconomic History    Marital status: Single     Spouse name: Not on file    Number of children: Not on file    Years of education: Not on file    Highest education level: Not on file   Occupational History    Not on file   Social Needs    Financial resource strain: Not on file    Food insecurity     Worry: Not on file     Inability: Not on file    Transportation needs     Medical: Not on file     Non-medical: Not on file   Tobacco Use    Smoking status: Former Smoker     Packs/day: 0.25     Years: 50.00     Pack years: 12.50     Types: Cigarettes     Quit date: 2017     Years since quittin.7    Smokeless tobacco: Never Used   Substance and Sexual Activity    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     Drug use: No    Sexual activity: Never   Lifestyle    Physical activity     Days per week: Not on file     Minutes per session: Not on file    Stress: Not at all   Relationships    Social connections     Talks on phone: Not on file     Gets together: Not on file     Attends [employer] service: Not on file     Active member of club or organization: Not on file     Attends meetings of clubs or organizations: Not on file     Relationship status: Not on file   Other Topics Concern    Not on file   Social History Narrative    Not on file       FAMILY HISTORY:     Family History   Problem Relation Age of Onset    Cancer Mother         breast    Heart disease Mother     Breast cancer Mother     Cancer Father         lung-smoker     Cancer Sister         lung-smoker     Heart attack Sister     Cancer Maternal Grandmother     Heart disease Maternal Grandmother     Cancer Maternal Grandfather     Heart disease Maternal Grandfather     Cancer Paternal Grandmother      Cancer Paternal Grandfather     Cancer Sister         mets not sure where it started     COPD Sister     Kidney cancer Son        REVIEW OF SYSTEMS:   Review of Systems   Constitution: Positive for malaise/fatigue.   HENT: Negative.    Eyes: Negative.    Cardiovascular: Positive for dyspnea on exertion.   Respiratory: Negative.    Endocrine: Negative.    Hematologic/Lymphatic: Negative.    Skin: Negative.    Musculoskeletal: Negative.    Gastrointestinal: Negative.    Genitourinary: Negative.    Neurological: Negative.    Psychiatric/Behavioral: Negative.    Allergic/Immunologic: Negative.        A 10 point review of systems was performed and all the pertinent positives have been mentioned. Rest of review of systems was negative.        PHYSICAL EXAM:     Vitals:    08/05/20 0928   BP: 110/76   Pulse: 67   Resp: 16    Body mass index is 31.24 kg/m².  Weight: 80 kg (176 lb 5.9 oz)         Physical Exam   Constitutional: She is oriented to person, place, and time. She appears well-developed and well-nourished.   HENT:   Head: Normocephalic.   Eyes: Pupils are equal, round, and reactive to light.   Neck: Normal range of motion. Neck supple.   Cardiovascular: Normal rate and regular rhythm.   Pulmonary/Chest: Effort normal and breath sounds normal.   Abdominal: Soft. Normal appearance and bowel sounds are normal. There is no abdominal tenderness.   Musculoskeletal: Normal range of motion.   Neurological: She is alert and oriented to person, place, and time.   Skin: Skin is warm.   Psychiatric: She has a normal mood and affect.         DATA:     Laboratory:  CBC:  Recent Labs   Lab 02/28/20  0720 06/16/20  0926 07/21/20  0745   WBC 4.50 6.85 5.13   Hemoglobin 13.0 13.9 13.0   Hematocrit 40.9 42.8 41.0   Platelets 179 217 253       CHEMISTRIES:  Recent Labs   Lab 08/16/19  0727 09/06/19  0734  02/28/20  0720 06/16/20  0926 07/21/20  0745   Glucose 137 H 138 H   < > 134 H 120 H 140 H   Sodium 143 142   < > 144 139  142   Potassium 4.4 3.6   < > 4.6 3.6 3.8   BUN, Bld 12 11   < > 14 19 14   Creatinine 0.8 0.8   < > 0.8 1.1 0.8   eGFR if African American >60 >60   < > >60 58 A >60   eGFR if non African American >60 >60   < > >60 50 A >60   Calcium 10.1 9.2   < > 9.7 9.2 9.3   Magnesium 1.8 1.8  --  1.8  --   --     < > = values in this interval not displayed.       CARDIAC BIOMARKERS:  Recent Labs   Lab 11/29/17  1153 06/11/18  1345 07/20/19  1249   Troponin I 0.193 H <0.006 0.012       COAGS:  Recent Labs   Lab 11/29/17  0304 02/09/18  1020 07/20/19  1249   INR 1.0 1.0 1.0       LIPIDS/LFTS:  Recent Labs   Lab 03/04/19  0728  02/03/20  0730 02/28/20  0720 06/16/20  0926 07/21/20  0745   Cholesterol 166  --  125  --   --   --    Triglycerides 192 H  --  117  --   --   --    HDL 56  --  48  --   --   --    LDL Cholesterol 71.6  --  53.6 L  --   --   --    Non-HDL Cholesterol 110  --  77  --   --   --    AST 18   < > 19 19 18 17   ALT 19   < > 20 22 24 15    < > = values in this interval not displayed.       Hemoglobin A1C   Date Value Ref Range Status   02/03/2020 6.1 (H) 4.0 - 5.6 % Final     Comment:     ADA Screening Guidelines:  5.7-6.4%  Consistent with prediabetes  >or=6.5%  Consistent with diabetes  High levels of fetal hemoglobin interfere with the HbA1C  assay. Heterozygous hemoglobin variants (HbS, HgC, etc)do  not significantly interfere with this assay.   However, presence of multiple variants may affect accuracy.     03/04/2019 6.1 (H) 4.0 - 5.6 % Final     Comment:     ADA Screening Guidelines:  5.7-6.4%  Consistent with prediabetes  >or=6.5%  Consistent with diabetes  High levels of fetal hemoglobin interfere with the HbA1C  assay. Heterozygous hemoglobin variants (HbS, HgC, etc)do  not significantly interfere with this assay.   However, presence of multiple variants may affect accuracy.     03/16/2018 5.8 (H) 4.0 - 5.6 % Final     Comment:     According to ADA guidelines, hemoglobin A1c <7.0% represents  optimal  control in non-pregnant diabetic patients. Different  metrics may apply to specific patient populations.   Standards of Medical Care in Diabetes-2016.  For the purpose of screening for the presence of diabetes:  <5.7%     Consistent with the absence of diabetes  5.7-6.4%  Consistent with increasing risk for diabetes   (prediabetes)  >or=6.5%  Consistent with diabetes  Currently, no consensus exists for use of hemoglobin A1c  for diagnosis of diabetes for children.  This Hemoglobin A1c assay has significant interference with fetal   hemoglobin   (HbF). The results are invalid for patients with abnormal amounts of   HbF,   including those with known Hereditary Persistence   of Fetal Hemoglobin. Heterozygous hemoglobin variants (HbAS, HbAC,   HbAD, HbAE, HbA2) do not significantly interfere with this assay;   however, presence of multiple variants in a sample may impact the %   interference.         TSH  Recent Labs   Lab 11/29/17  0304   TSH 0.726       The ASCVD Risk score (Rosario AVINA Jr., et al., 2013) failed to calculate for the following reasons:    The patient has a prior MI or stroke diagnosis           Cardiovascular Testing:    EKG: (personally reviewed tracing)  Sinus bradycardia      ASSESSMENT AND PLAN     Patient Active Problem List   Diagnosis    Benign hypertensive heart disease without heart failure    CAD (coronary artery disease)    History of breast cancer    Hyperlipidemia LDL goal <70    Old myocardial infarction    Stable angina    Atherosclerosis of aorta    Prediabetes    DNR (do not resuscitate)    COPD (chronic obstructive pulmonary disease)    Squamous cell carcinoma lung    History of lung cancer    Metastatic breast cancer    Aortic arch atherosclerosis     Patient with a past medical history of coronary artery disease. .  Past medical history significant for lung cancer.  Now significant dyspnea on exertion which has worsened over the past few months.  Can only walk 10 ft and  then has to stop because of shortness of breath.  The stress test is abnormal.  Her echo shows grade 2 diastolic dysfunction.  Her dyspnea on exertion is multifactorial with pulmonary abnormalities, abnormal stress test and diastolic dysfunction contributing to it.  Because of abnormal stress test we discussed doing a coronary angiogram versus doing medical management.  After detailed discussion decided to proceed with a coronary angiogram.    Risks, benefits and alternatives of the catheterization procedure were discussed with the patient.The risks of coronary angiography include but are not limited to: bleeding, infection, death, heart attack, arrhythmia, kidney injury or failure, potential need for dialysis, allergic reactions, stroke, need for emergency surgery, hematoma, pseudoaneurysm etc.  Should stenting be indicated, the patient has agreed to dual anti-platelet therapy for 1-consecutive year with a drug-eluting stent and a minimum of 1-month with the use of a bare metal stent. Additionally, pt is aware that non-compliance is likely to result in stent clotting with heart attack, heart failure, and/or death  The risks of moderate sedation include hypotension, respiratory depression, arrhythmias, bronchospasm, and death. Informed consent was obtained and the  patient is agreeable to proceed with the procedure. Consent was placed on the chart.      Heart failure with preserved ejection fraction    Squamous cell carcinoma of lung    Breast cancer    Aortic arch atherosclerosis        Follow-up in Cardiology Clinic after testing            Thank you very much for involving me in the care of your patient.  Please do not hesitate to contact me if there are any questions.      Guevara Skelton MD, FACC, [facility]  Interventional Cardiologist, [facility].           This note was dictated with the help of speech recognition software.  There might be un-intended errors and/or substitutions.

## 2020-08-07 ENCOUNTER — HOSPITAL ENCOUNTER (OUTPATIENT)
Dept: PREADMISSION TESTING | Facility: HOSPITAL | Age: 75
Discharge: HOME | End: 2020-08-07
Attending: INTERNAL MEDICINE
Payer: MEDICARE

## 2020-08-07 VITALS
HEART RATE: 52 BPM | RESPIRATION RATE: 18 BRPM | OXYGEN SATURATION: 98 % | WEIGHT: 180.75 LBS | BODY MASS INDEX: 32.03 KG/M2 | HEIGHT: 63 IN | TEMPERATURE: 97 F

## 2020-08-07 DIAGNOSIS — R06.09 DOE (DYSPNEA ON EXERTION): ICD-10-CM

## 2020-08-07 LAB
ANION GAP SERPL CALC-SCNC: 8 MMOL/L (ref 8–16)
BASOPHILS # BLD AUTO: 0.04 K/UL (ref 0–0.2)
BASOPHILS NFR BLD: 0.8 % (ref 0–1.9)
BUN SERPL-MCNC: 12 MG/DL (ref 8–23)
CALCIUM SERPL-MCNC: 9.3 MG/DL (ref 8.7–10.5)
CHLORIDE SERPL-SCNC: 102 MMOL/L (ref 95–110)
CO2 SERPL-SCNC: 27 MMOL/L (ref 23–29)
CREAT SERPL-MCNC: 0.7 MG/DL (ref 0.5–1.4)
DIFFERENTIAL METHOD: ABNORMAL
EOSINOPHIL # BLD AUTO: 0.1 K/UL (ref 0–0.5)
EOSINOPHIL NFR BLD: 2.3 % (ref 0–8)
ERYTHROCYTE [DISTWIDTH] IN BLOOD BY AUTOMATED COUNT: 14 % (ref 11.5–14.5)
EST. GFR  (AFRICAN AMERICAN): >60 ML/MIN/1.73 M^2
EST. GFR  (NON AFRICAN AMERICAN): >60 ML/MIN/1.73 M^2
GLUCOSE SERPL-MCNC: 106 MG/DL (ref 70–110)
HCT VFR BLD AUTO: 42.4 % (ref 37–48.5)
HGB BLD-MCNC: 13.8 G/DL (ref 12–16)
IMM GRANULOCYTES # BLD AUTO: 0.04 K/UL (ref 0–0.04)
IMM GRANULOCYTES NFR BLD AUTO: 0.8 % (ref 0–0.5)
LYMPHOCYTES # BLD AUTO: 1 K/UL (ref 1–4.8)
LYMPHOCYTES NFR BLD: 21.1 % (ref 18–48)
MCH RBC QN AUTO: 31.2 PG (ref 27–31)
MCHC RBC AUTO-ENTMCNC: 32.5 G/DL (ref 32–36)
MCV RBC AUTO: 96 FL (ref 82–98)
MONOCYTES # BLD AUTO: 0.4 K/UL (ref 0.3–1)
MONOCYTES NFR BLD: 8.9 % (ref 4–15)
NEUTROPHILS # BLD AUTO: 3.1 K/UL (ref 1.8–7.7)
NEUTROPHILS NFR BLD: 66.1 % (ref 38–73)
NRBC BLD-RTO: 0 /100 WBC
PLATELET # BLD AUTO: 199 K/UL (ref 150–350)
PMV BLD AUTO: 9.2 FL (ref 9.2–12.9)
POTASSIUM SERPL-SCNC: 3.7 MMOL/L (ref 3.5–5.1)
RBC # BLD AUTO: 4.43 M/UL (ref 4–5.4)
SODIUM SERPL-SCNC: 137 MMOL/L (ref 136–145)
WBC # BLD AUTO: 4.74 K/UL (ref 3.9–12.7)

## 2020-08-07 PROCEDURE — 36415 COLL VENOUS BLD VENIPUNCTURE: CPT | Mod: HCNC

## 2020-08-07 PROCEDURE — 80048 BASIC METABOLIC PNL TOTAL CA: CPT | Mod: HCNC

## 2020-08-07 PROCEDURE — 85025 COMPLETE CBC W/AUTO DIFF WBC: CPT | Mod: HCNC

## 2020-08-07 NOTE — DISCHARGE INSTRUCTIONS
Your angiogram is scheduled for__8/13/2020_____________    Call [phone] between 2pm and 5pm on __8/12/2020__________to find out your arrival time for the day of your procedure.    Report to Same Day Surgery Unit at ____ AM on the 2nd floor of the hospital.  Enter through the Emergency Room.    IMPORTANT INSTRUCTIONS:  Do not eat or drink anything after midnight.  This includes water and coffee.  It is okay to brush your teeth.  Do not chew gum or have hard candy or mints.    Take your morning medications as instructed with small sips of water.       Shower the night before your procedure and the morning of your procedure with Hibiclens as directed.     Do not wear make-up.     You may wear deodorant, but do not wear body lotion, powder or perfume/cologne       Do not wear jewelry.     You will be asked to remove any dentures or partials for the procedure.     You do not need money or valuables.  You may bring your cell phone.     If you are going home the same day, you must arrange for someone to drive you home.     Wear comfortable, loose fitting clothes.      Call your doctor if you have sickness or fever before your angiogram.     Our number in [facility] is [phone] if you need to contact us.     If you are going home the same day, you must arrange for a family member or a friend to drive you home.  Public transportation is prohibited.

## 2020-08-12 ENCOUNTER — HOSPITAL ENCOUNTER (OUTPATIENT)
Dept: PREADMISSION TESTING | Facility: HOSPITAL | Age: 75
Discharge: HOME | End: 2020-08-12
Attending: INTERNAL MEDICINE
Payer: MEDICARE

## 2020-08-12 DIAGNOSIS — Z01.818 PREOP TESTING: ICD-10-CM

## 2020-08-12 LAB — SARS-COV-2 RDRP RESP QL NAA+PROBE: NEGATIVE

## 2020-08-12 PROCEDURE — U0002 COVID-19 LAB TEST NON-CDC: HCPCS | Mod: HCNC

## 2020-08-13 ENCOUNTER — HOSPITAL ENCOUNTER (OUTPATIENT)
Facility: HOSPITAL | Age: 75
Discharge: HOME | End: 2020-08-13
Attending: INTERNAL MEDICINE | Admitting: INTERNAL MEDICINE
Payer: MEDICARE

## 2020-08-13 VITALS
TEMPERATURE: 98 F | SYSTOLIC BLOOD PRESSURE: 108 MMHG | WEIGHT: 180.75 LBS | OXYGEN SATURATION: 96 % | HEART RATE: 60 BPM | RESPIRATION RATE: 18 BRPM | DIASTOLIC BLOOD PRESSURE: 58 MMHG | BODY MASS INDEX: 32.02 KG/M2

## 2020-08-13 DIAGNOSIS — C50.919 METASTATIC BREAST CANCER: Chronic | ICD-10-CM

## 2020-08-13 DIAGNOSIS — R06.09 DOE (DYSPNEA ON EXERTION): ICD-10-CM

## 2020-08-13 DIAGNOSIS — C34.90 SQUAMOUS CELL CARCINOMA OF LUNG, UNSPECIFIED LATERALITY: Chronic | ICD-10-CM

## 2020-08-13 DIAGNOSIS — Z85.3 HISTORY OF BREAST CANCER: Chronic | ICD-10-CM

## 2020-08-13 DIAGNOSIS — Z66 DNR (DO NOT RESUSCITATE): Chronic | ICD-10-CM

## 2020-08-13 DIAGNOSIS — Z85.118 HISTORY OF LUNG CANCER: ICD-10-CM

## 2020-08-13 DIAGNOSIS — I70.0 ATHEROSCLEROSIS OF AORTA: Chronic | ICD-10-CM

## 2020-08-13 DIAGNOSIS — Z01.818 PREOP TESTING: Primary | ICD-10-CM

## 2020-08-13 DIAGNOSIS — I11.9 BENIGN HYPERTENSIVE HEART DISEASE WITHOUT HEART FAILURE: Chronic | ICD-10-CM

## 2020-08-13 DIAGNOSIS — I50.32 CHRONIC HEART FAILURE WITH PRESERVED EJECTION FRACTION: ICD-10-CM

## 2020-08-13 DIAGNOSIS — I25.2 OLD MYOCARDIAL INFARCTION: ICD-10-CM

## 2020-08-13 DIAGNOSIS — I25.10 CORONARY ARTERY DISEASE INVOLVING NATIVE CORONARY ARTERY OF NATIVE HEART WITHOUT ANGINA PECTORIS: Chronic | ICD-10-CM

## 2020-08-13 DIAGNOSIS — R94.39 ABNORMAL STRESS TEST: ICD-10-CM

## 2020-08-13 DIAGNOSIS — R73.03 PREDIABETES: ICD-10-CM

## 2020-08-13 DIAGNOSIS — I20.89 STABLE ANGINA: Chronic | ICD-10-CM

## 2020-08-13 DIAGNOSIS — I70.0 AORTIC ARCH ATHEROSCLEROSIS: ICD-10-CM

## 2020-08-13 DIAGNOSIS — E78.5 HYPERLIPIDEMIA LDL GOAL <70: Chronic | ICD-10-CM

## 2020-08-13 DIAGNOSIS — J44.9 CHRONIC OBSTRUCTIVE PULMONARY DISEASE, UNSPECIFIED COPD TYPE: Chronic | ICD-10-CM

## 2020-08-13 PROCEDURE — C1894 INTRO/SHEATH, NON-LASER: HCPCS | Mod: HCNC | Performed by: INTERNAL MEDICINE

## 2020-08-13 PROCEDURE — 93458 L HRT ARTERY/VENTRICLE ANGIO: CPT | Mod: HCNC | Performed by: INTERNAL MEDICINE

## 2020-08-13 PROCEDURE — 99152 MOD SED SAME PHYS/QHP 5/>YRS: CPT | Mod: HCNC | Performed by: INTERNAL MEDICINE

## 2020-08-13 PROCEDURE — 99152 MOD SED SAME PHYS/QHP 5/>YRS: CPT | Mod: HCNC,,, | Performed by: INTERNAL MEDICINE

## 2020-08-13 PROCEDURE — 99152 PR MOD CONSCIOUS SEDATION, SAME PHYS, 5+ YRS, FIRST 15 MIN: ICD-10-PCS | Mod: HCNC,,, | Performed by: INTERNAL MEDICINE

## 2020-08-13 PROCEDURE — C1769 GUIDE WIRE: HCPCS | Mod: HCNC | Performed by: INTERNAL MEDICINE

## 2020-08-13 PROCEDURE — 25500020 PHARM REV CODE 255: Mod: HCNC | Performed by: INTERNAL MEDICINE

## 2020-08-13 PROCEDURE — C1887 CATHETER, GUIDING: HCPCS | Mod: HCNC | Performed by: INTERNAL MEDICINE

## 2020-08-13 PROCEDURE — 63600175 PHARM REV CODE 636 W HCPCS: Mod: HCNC | Performed by: INTERNAL MEDICINE

## 2020-08-13 PROCEDURE — 93458 L HRT ARTERY/VENTRICLE ANGIO: CPT | Mod: 26,HCNC,, | Performed by: INTERNAL MEDICINE

## 2020-08-13 PROCEDURE — A4216 STERILE WATER/SALINE, 10 ML: HCPCS | Mod: HCNC | Performed by: INTERNAL MEDICINE

## 2020-08-13 PROCEDURE — 25000003 PHARM REV CODE 250: Mod: HCNC | Performed by: INTERNAL MEDICINE

## 2020-08-13 PROCEDURE — 93458 PR CATH PLACE/CORON ANGIO, IMG SUPER/INTERP,W LEFT HEART VENTRICULOGRAPHY: ICD-10-PCS | Mod: 26,HCNC,, | Performed by: INTERNAL MEDICINE

## 2020-08-13 RX ORDER — SODIUM CHLORIDE 9 MG/ML
INJECTION, SOLUTION INTRAVENOUS CONTINUOUS
Status: DISCONTINUED | OUTPATIENT
Start: 2020-08-13 | End: 2020-08-13 | Stop reason: HOSPADM

## 2020-08-13 RX ORDER — PHENYLEPHRINE HCL IN 0.9% NACL 1 MG/10 ML
SYRINGE (ML) INTRAVENOUS
Status: DISCONTINUED | OUTPATIENT
Start: 2020-08-13 | End: 2020-08-13 | Stop reason: HOSPADM

## 2020-08-13 RX ORDER — OMEPRAZOLE 10 MG/1
10 CAPSULE, DELAYED RELEASE ORAL DAILY
COMMUNITY
End: 2021-01-22

## 2020-08-13 RX ORDER — MIDAZOLAM HYDROCHLORIDE 1 MG/ML
INJECTION, SOLUTION INTRAMUSCULAR; INTRAVENOUS
Status: DISCONTINUED | OUTPATIENT
Start: 2020-08-13 | End: 2020-08-13 | Stop reason: HOSPADM

## 2020-08-13 RX ORDER — HYDROCODONE BITARTRATE AND ACETAMINOPHEN 5; 325 MG/1; MG/1
1 TABLET ORAL EVERY 4 HOURS PRN
Status: DISCONTINUED | OUTPATIENT
Start: 2020-08-13 | End: 2020-08-13 | Stop reason: HOSPADM

## 2020-08-13 RX ORDER — VERAPAMIL HYDROCHLORIDE 2.5 MG/ML
INJECTION, SOLUTION INTRAVENOUS
Status: DISCONTINUED | OUTPATIENT
Start: 2020-08-13 | End: 2020-08-13 | Stop reason: HOSPADM

## 2020-08-13 RX ORDER — NITROGLYCERIN 20 MG/100ML
INJECTION INTRAVENOUS
Status: DISCONTINUED | OUTPATIENT
Start: 2020-08-13 | End: 2020-08-13 | Stop reason: HOSPADM

## 2020-08-13 RX ORDER — LIDOCAINE HYDROCHLORIDE 10 MG/ML
INJECTION, SOLUTION EPIDURAL; INFILTRATION; INTRACAUDAL; PERINEURAL
Status: DISCONTINUED | OUTPATIENT
Start: 2020-08-13 | End: 2020-08-13 | Stop reason: HOSPADM

## 2020-08-13 RX ORDER — SODIUM CHLORIDE 9 MG/ML
INJECTION, SOLUTION INTRAMUSCULAR; INTRAVENOUS; SUBCUTANEOUS
Status: DISCONTINUED | OUTPATIENT
Start: 2020-08-13 | End: 2020-08-13 | Stop reason: HOSPADM

## 2020-08-13 RX ORDER — ACETAMINOPHEN 325 MG/1
650 TABLET ORAL EVERY 4 HOURS PRN
Status: DISCONTINUED | OUTPATIENT
Start: 2020-08-13 | End: 2020-08-13 | Stop reason: HOSPADM

## 2020-08-13 RX ORDER — NAPROXEN SODIUM 220 MG/1
81 TABLET, FILM COATED ORAL ONCE
Status: DISCONTINUED | OUTPATIENT
Start: 2020-08-13 | End: 2020-08-13 | Stop reason: HOSPADM

## 2020-08-13 RX ORDER — FENTANYL CITRATE 50 UG/ML
INJECTION, SOLUTION INTRAMUSCULAR; INTRAVENOUS
Status: DISCONTINUED | OUTPATIENT
Start: 2020-08-13 | End: 2020-08-13 | Stop reason: HOSPADM

## 2020-08-13 RX ORDER — HEPARIN SODIUM 1000 [USP'U]/ML
INJECTION, SOLUTION INTRAVENOUS; SUBCUTANEOUS
Status: DISCONTINUED | OUTPATIENT
Start: 2020-08-13 | End: 2020-08-13 | Stop reason: HOSPADM

## 2020-08-13 RX ORDER — CLOPIDOGREL 300 MG/1
600 TABLET, FILM COATED ORAL ONCE
Status: COMPLETED | OUTPATIENT
Start: 2020-08-13 | End: 2020-08-13

## 2020-08-13 RX ADMIN — CLOPIDOGREL BISULFATE 600 MG: 300 TABLET, FILM COATED ORAL at 10:08

## 2020-08-13 RX ADMIN — SODIUM CHLORIDE: 0.9 INJECTION, SOLUTION INTRAVENOUS at 10:08

## 2020-08-13 NOTE — Clinical Note
28 ml injected throughout the case. 122 mL total wasted during the case. 150 mL total used in the case.

## 2020-08-13 NOTE — DISCHARGE SUMMARY
[facility]  Brief Operative Note     SUMMARY     Surgery Date: 8/13/2020     Surgeon(s) and Role:     * Guevara Skelton MD - Primary    Assisting Surgeon: None    Pre-op Diagnosis:  LOGAN (dyspnea on exertion) [R06.09]    Post-op Diagnosis:  Post-Op Diagnosis Codes:     * LOGAN (dyspnea on exertion) [R06.09]    Procedure(s) (LRB):  Left heart cath, rra, noon (Left)    Anesthesia: RN IV Sedation    Description of the findings of the procedure:  Underwent cardiac catheterization via the right radial artery approach.  No complications.  Luminal irregularities.  No PCI needed.    Findings/Key Components:  As above    Estimated Blood Loss:  Less than 10 cc      Specimens: None    Diet:     Activity: ad nilo.    Discharge Note    SUMMARY     Admit Date: 8/13/2020    Discharge Date and Time:  08/13/2020   Hospital Course (synopsis of major diagnoses, care, treatment, and services provided during the course of the hospital stay):  Patient underwent cardiac catheterization via the right radial artery approach.  No complications.  Will be observed for few hours in the observation unit and discharged home if no complications.    Final Diagnosis: Post-Op Diagnosis Codes:     * LOGAN (dyspnea on exertion) [R06.09]    Disposition: Home or Self Care    Follow Up/Patient Instructions:     Medications:  Reconciled Home Medications:      Medication List      ASK your doctor about these medications    * albuterol 90 mcg/actuation inhaler  Commonly known as: VENTOLIN HFA  INHALE 2 PUFF(S) BY MOUTH EVERY 4 - 6 HOURS AS NEEDED FOR SHORTNESS OF BREATH or WHEEZING     * albuterol 2.5 mg /3 mL (0.083 %) nebulizer solution  Commonly known as: PROVENTIL  NEBULIZE 1 vial EVERY 6 HOURS AS NEEDED FOR WHEEZING     aspirin 81 MG EC tablet  Commonly known as: ECOTRIN  Take 81 mg by mouth once daily.     fluticasone propionate 50 mcg/actuation nasal spray  Commonly known as: FLONASE  USE TWO SPRAYS IN EACH NOSTRIL ONCE A DAY      isosorbide mononitrate 30 MG 24 hr tablet  Commonly known as: IMDUR  Take 1 tablet (30 mg total) by mouth once daily.     metoprolol tartrate 25 MG tablet  Commonly known as: LOPRESSOR  Take 1 tablet (25 mg total) by mouth 2 (two) times daily.     nitroGLYCERIN 0.4 MG SL tablet  Commonly known as: NITROSTAT  place 1 tablet under the tongue AS NEEDED. no more than 3 in 15 minutes     omeprazole 10 MG capsule  Commonly known as: PRILOSEC  Take 10 mg by mouth once daily.     PAZEO 0.7 % Drop  Generic drug: olopatadine  instill 1 drop IN BOTH EYES daily     rosuvastatin 10 MG tablet  Commonly known as: CRESTOR  Take 1 tablet (10 mg total) by mouth once daily.     TRELEGY ELLIPTA 100-62.5-25 mcg Dsdv  Generic drug: fluticasone-umeclidin-vilanter  INHALE ONE PUFF INTO THE LUNGS ONCE A DAY     TYLENOL PM EXTRA STRENGTH  mg Tab  Generic drug: diphenhydrAMINE-acetaminophen  Take 1 tablet by mouth nightly as needed.         * This list has 2 medication(s) that are the same as other medications prescribed for you. Read the directions carefully, and ask your doctor or other care provider to review them with you.              No discharge procedures on file.        Diet:     Activity: ad nilo.

## 2020-08-13 NOTE — DISCHARGE INSTRUCTIONS
Limit movement of the wrist.  Do not bend wrist or perform heavy lifting for 24 hours.      NO blood pressure cuff or venipuncture to affected arm for 24 hours.    If bleeding occurs, apply pressure to site for 20 minutes. Apply band aid once bleeding stops.  Call 911 if unable to stop bleeding with pressure.     Keep your follow up appointment.      Do not drive, drink alcohol, or sign legal documents for 24 hours, or if taking narcotic pain medication.  Fall Prevention  Millions of people fall every year and injure themselves. You may have had anesthesia or sedation which may increase your risk of falling. You may have health issues that put you at an increased risk of falling.     Here are ways to reduce your risk of falling.  ·   · Make your home safe by keeping walkways clear of objects you may trip over.  · Use non-slip pads under rugs. Do not use area rugs or small throw rugs.  · Use non-slip mats in bathtubs and showers.  · Install handrails and lights on staircases.  · Do not walk in poorly lit areas.  · Do not stand on chairs or wobbly ladders.  · Use caution when reaching overhead or looking upward. This position can cause a loss of balance.  · Be sure your shoes fit properly, have non-slip bottoms and are in good condition.   · Wear shoes both inside and out. Avoid going barefoot or wearing slippers.  · Be cautious when going up and down stairs, curbs, and when walking on uneven sidewalks.  · If your balance is poor, consider using a cane or walker.  · If your fall was related to alcohol use, stop or limit alcohol intake.   · If your fall was related to use of sleeping medicines, talk to your doctor about this. You may need to reduce your dosage at bedtime if you awaken during the night to go to the bathroom.    · To reduce the need for nighttime bathroom trips:  ¨ Avoid drinking fluids for several hours before going to bed  ¨ Empty your bladder before going to bed  ¨ Men can keep a urinal at the  bedside  · Stay as active as you can. Balance, flexibility, strength, and endurance all come from exercise. They all play a role in preventing falls. Ask your healthcare provider which types of activity are right for you.  · Get your vision checked on a regular basis.  · If you have pets, know where they are before you stand up or walk so you don't trip over them.  · Use night lights.

## 2020-08-20 ENCOUNTER — OFFICE VISIT (OUTPATIENT)
Dept: CARDIOLOGY | Facility: CLINIC | Age: 75
End: 2020-08-20
Payer: MEDICARE

## 2020-08-20 VITALS
BODY MASS INDEX: 32.03 KG/M2 | OXYGEN SATURATION: 95 % | SYSTOLIC BLOOD PRESSURE: 132 MMHG | HEIGHT: 63 IN | WEIGHT: 180.75 LBS | DIASTOLIC BLOOD PRESSURE: 66 MMHG | HEART RATE: 67 BPM

## 2020-08-20 DIAGNOSIS — I25.2 OLD MYOCARDIAL INFARCTION: ICD-10-CM

## 2020-08-20 DIAGNOSIS — I11.9 BENIGN HYPERTENSIVE HEART DISEASE WITHOUT HEART FAILURE: Chronic | ICD-10-CM

## 2020-08-20 DIAGNOSIS — E78.5 HYPERLIPIDEMIA LDL GOAL <70: Chronic | ICD-10-CM

## 2020-08-20 DIAGNOSIS — I50.32 CHRONIC HEART FAILURE WITH PRESERVED EJECTION FRACTION: ICD-10-CM

## 2020-08-20 DIAGNOSIS — I25.10 CORONARY ARTERY DISEASE INVOLVING NATIVE CORONARY ARTERY OF NATIVE HEART WITHOUT ANGINA PECTORIS: Chronic | ICD-10-CM

## 2020-08-20 DIAGNOSIS — R94.39 ABNORMAL STRESS TEST: ICD-10-CM

## 2020-08-20 DIAGNOSIS — I20.89 STABLE ANGINA: Chronic | ICD-10-CM

## 2020-08-20 DIAGNOSIS — C34.90 SQUAMOUS CELL CARCINOMA OF LUNG, UNSPECIFIED LATERALITY: Chronic | ICD-10-CM

## 2020-08-20 DIAGNOSIS — I70.0 ATHEROSCLEROSIS OF AORTA: Chronic | ICD-10-CM

## 2020-08-20 DIAGNOSIS — R06.09 DOE (DYSPNEA ON EXERTION): ICD-10-CM

## 2020-08-20 DIAGNOSIS — R73.03 PREDIABETES: ICD-10-CM

## 2020-08-20 DIAGNOSIS — J44.9 CHRONIC OBSTRUCTIVE PULMONARY DISEASE, UNSPECIFIED COPD TYPE: Primary | Chronic | ICD-10-CM

## 2020-08-20 DIAGNOSIS — I70.0 AORTIC ARCH ATHEROSCLEROSIS: ICD-10-CM

## 2020-08-20 PROCEDURE — 99999 PR PBB SHADOW E&M-EST. PATIENT-LVL IV: ICD-10-PCS | Mod: PBBFAC,HCNC,, | Performed by: INTERNAL MEDICINE

## 2020-08-20 PROCEDURE — 1101F PT FALLS ASSESS-DOCD LE1/YR: CPT | Mod: HCNC,CPTII,S$GLB, | Performed by: INTERNAL MEDICINE

## 2020-08-20 PROCEDURE — 1159F PR MEDICATION LIST DOCUMENTED IN MEDICAL RECORD: ICD-10-PCS | Mod: HCNC,S$GLB,, | Performed by: INTERNAL MEDICINE

## 2020-08-20 PROCEDURE — 3008F BODY MASS INDEX DOCD: CPT | Mod: HCNC,CPTII,S$GLB, | Performed by: INTERNAL MEDICINE

## 2020-08-20 PROCEDURE — 99999 PR PBB SHADOW E&M-EST. PATIENT-LVL IV: CPT | Mod: PBBFAC,HCNC,, | Performed by: INTERNAL MEDICINE

## 2020-08-20 PROCEDURE — 3075F PR MOST RECENT SYSTOLIC BLOOD PRESS GE 130-139MM HG: ICD-10-PCS | Mod: HCNC,CPTII,S$GLB, | Performed by: INTERNAL MEDICINE

## 2020-08-20 PROCEDURE — 1126F AMNT PAIN NOTED NONE PRSNT: CPT | Mod: HCNC,S$GLB,, | Performed by: INTERNAL MEDICINE

## 2020-08-20 PROCEDURE — 1101F PR PT FALLS ASSESS DOC 0-1 FALLS W/OUT INJ PAST YR: ICD-10-PCS | Mod: HCNC,CPTII,S$GLB, | Performed by: INTERNAL MEDICINE

## 2020-08-20 PROCEDURE — 99215 PR OFFICE/OUTPT VISIT, EST, LEVL V, 40-54 MIN: ICD-10-PCS | Mod: HCNC,S$GLB,, | Performed by: INTERNAL MEDICINE

## 2020-08-20 PROCEDURE — 3078F DIAST BP <80 MM HG: CPT | Mod: HCNC,CPTII,S$GLB, | Performed by: INTERNAL MEDICINE

## 2020-08-20 PROCEDURE — 3075F SYST BP GE 130 - 139MM HG: CPT | Mod: HCNC,CPTII,S$GLB, | Performed by: INTERNAL MEDICINE

## 2020-08-20 PROCEDURE — 99499 UNLISTED E&M SERVICE: CPT | Mod: HCNC,S$GLB,, | Performed by: INTERNAL MEDICINE

## 2020-08-20 PROCEDURE — 3008F PR BODY MASS INDEX (BMI) DOCUMENTED: ICD-10-PCS | Mod: HCNC,CPTII,S$GLB, | Performed by: INTERNAL MEDICINE

## 2020-08-20 PROCEDURE — 1159F MED LIST DOCD IN RCRD: CPT | Mod: HCNC,S$GLB,, | Performed by: INTERNAL MEDICINE

## 2020-08-20 PROCEDURE — 99215 OFFICE O/P EST HI 40 MIN: CPT | Mod: HCNC,S$GLB,, | Performed by: INTERNAL MEDICINE

## 2020-08-20 PROCEDURE — 3078F PR MOST RECENT DIASTOLIC BLOOD PRESSURE < 80 MM HG: ICD-10-PCS | Mod: HCNC,CPTII,S$GLB, | Performed by: INTERNAL MEDICINE

## 2020-08-20 PROCEDURE — 1126F PR PAIN SEVERITY QUANTIFIED, NO PAIN PRESENT: ICD-10-PCS | Mod: HCNC,S$GLB,, | Performed by: INTERNAL MEDICINE

## 2020-08-20 PROCEDURE — 99499 RISK ADDL DX/OHS AUDIT: ICD-10-PCS | Mod: HCNC,S$GLB,, | Performed by: INTERNAL MEDICINE

## 2020-08-20 NOTE — PROGRESS NOTES
CARDIOVASCULAR CONSULTATION    REASON FOR CONSULT:   Ade Castellano is a 74 y.o. female who presents for preoperative risk assessment.      HISTORY OF PRESENT ILLNESS:     Patient is a pleasant 74-year-old lady.  Here for preoperative risk assessment prior to lymph node resection.  She has a past medical history significant for coronary artery disease status post PCI in 2006.  Since then has been angina free.  Had a stress test performed in October of 2018 which was normal.  Denies any anginal sounding chest pains, orthopnea, PND.  Does have mild dyspnea on exertion, which she states has actually been improving since her stress test in 2018.  Denies any other cardiac anginal symptoms.    Notes from July 2020:  Patient here for follow-up.  States that underwent radiation therapy for her lymph nodes.  Has been told that her cancer is gone now.  States that lately has been noticing severe dyspnea on exertion.  Can only walk 10 ft and then has to stop.  Also has dry cough all the time.  Not related to postural or activity.  Denies any chest tightness but does have severe dyspnea on exertion.  Denies any orthopnea, PND.      Notes from August 2020:  Patient here for follow-up.  Stress test was abnormal.      The study shows abnormal myocardial perfusion.    Abnormal myocardial perfusion study.    Perfusion Defect There is a mild to moderate intensity, small to moderate sized, mostly reversible with some fixed areas defect in the anteroapical wall(s).    Gated perfusion images showed an ejection fraction of 71%    There is normal wall motion at rest and post stress.    The EKG portion of this study is negative for ischemia.    The patient reported no chest pain during the stress test.    There were no arrhythmias during stress.    · The diary was not returned.  · NSR. Heart rates varied between 47 and 89 bpm with an average of 58 bpm.  · Rare PACs / PVCs     · Normal left ventricular systolic function. The  estimated ejection fraction is 60%.  · Grade II (moderate) left ventricular diastolic dysfunction consistent with pseudonormalization.  · Normal right ventricular systolic function.  · Mild to moderate left atrial enlargement.  · Trivial pericardial effusion.  · The estimated PA systolic pressure is 25 mmHg.  · Normal central venous pressure (3 mmHg).      Notes from aug 20, 2020  Patient here for follow-up.  Denies any chest pains at rest on exertion, orthopnea, PND, swelling of feet.  No complications from cardiac catheterization.        · LVEDP (Pre): 18  · Estimated blood loss: <50 mL  · Non-obstructive CAD.  · No significant coronary artery stenosis. Luminal irregularities. Previously placed RCA stent is widely patent.      PAST MEDICAL HISTORY:     Past Medical History:   Diagnosis Date    Abnormal stress test 8/5/2020    Acute respiratory failure with hypoxia and hypercapnia 11/29/2017    Angina pectoris     Arthritis     Bell's palsy     left facial weakness    Breast cancer     RIGHT    CAD (coronary artery disease)     Cervical cancer     Chronic bronchitis     Chronic heart failure with preserved ejection fraction 8/5/2020    Chronic heart failure with preserved ejection fraction 8/5/2020    COPD (chronic obstructive pulmonary disease)     Dr. Katz    Dental bridge present     Emphysema of lung     H/O colonoscopy 06/2017    due for repeat colonsocopy in 6/2018    History of Bell's palsy     History of heart artery stent     Dr. Ortiz  x2 stents    Hyperlipidemia     Hypertension     Lung cancer     Myocardial infarction     SUNITHA (obstructive sleep apnea)     intolerant to mask    SUNITHA (obstructive sleep apnea)     intolerant to mask     Pneumonia     Pneumonia due to other staphylococcus     PUD (peptic ulcer disease)     Severe anemia 6/11/2018    Sleep apnea     Vaginal delivery     x1       PAST SURGICAL HISTORY:     Past Surgical History:   Procedure Laterality Date     ADENOIDECTOMY      BREAST BIOPSY Right     x3    BREAST LUMPECTOMY      BREAST SURGERY      lumpectomy right side     CERVIX SURGERY      cone    COLONOSCOPY N/A 3/17/2017    Procedure: COLONOSCOPY;  Surgeon: Julio Rudd MD;  Location: [facility] ENDO;  Service: Endoscopy;  Laterality: N/A;    COLONOSCOPY N/A 6/30/2017    Procedure: COLONOSCOPY;  Surgeon: Julio Rudd MD;  Location: [facility] ENDO;  Service: Endoscopy;  Laterality: N/A;    COLONOSCOPY N/A 11/28/2018    Procedure: COLONOSCOPY;  Surgeon: Nura Urbina MD;  Location: [facility] ENDO;  Service: Endoscopy;  Laterality: N/A;    COLONOSCOPY N/A 1/29/2019    Procedure: COLONOSCOPY;  Surgeon: Emmanuel Perez MD;  Location: [facility] ENDO;  Service: Endoscopy;  Laterality: N/A;  confirmed appt-SP    EYE SURGERY Bilateral 06/08/2018    cataract     LEFT HEART CATHETERIZATION Left 8/13/2020    Procedure: Left heart cath, rra, noon;  Surgeon: Guevara Skelton MD;  Location: [facility] CATH LAB;  Service: Cardiology;  Laterality: Left;  RN PREOP 8/7/2020---COVID NEGATIVE ON 8/12    PORTACATH PLACEMENT Right 01/2018    sweat glands axillary regions Bilateral     TONSILLECTOMY         ALLERGIES AND MEDICATION:   Review of patient's allergies indicates:  No Known Allergies     Medication List          Accurate as of August 20, 2020  8:43 AM. If you have any questions, ask your nurse or doctor.            CONTINUE taking these medications    * albuterol 90 mcg/actuation inhaler  Commonly known as: VENTOLIN HFA  INHALE 2 PUFF(S) BY MOUTH EVERY 4 - 6 HOURS AS NEEDED FOR SHORTNESS OF BREATH or WHEEZING     * albuterol 2.5 mg /3 mL (0.083 %) nebulizer solution  Commonly known as: PROVENTIL  NEBULIZE 1 vial EVERY 6 HOURS AS NEEDED FOR WHEEZING     aspirin 81 MG EC tablet  Commonly known as: ECOTRIN     fluticasone propionate 50 mcg/actuation nasal spray  Commonly known as: FLONASE  USE TWO SPRAYS IN EACH NOSTRIL ONCE A DAY     isosorbide mononitrate 30 MG 24 hr tablet  Commonly  known as: IMDUR  Take 1 tablet (30 mg total) by mouth once daily.     metoprolol tartrate 25 MG tablet  Commonly known as: LOPRESSOR  Take 1 tablet (25 mg total) by mouth 2 (two) times daily.     nitroGLYCERIN 0.4 MG SL tablet  Commonly known as: NITROSTAT  place 1 tablet under the tongue AS NEEDED. no more than 3 in 15 minutes     omeprazole 10 MG capsule  Commonly known as: PRILOSEC     PAZEO 0.7 % Drop  Generic drug: olopatadine     rosuvastatin 10 MG tablet  Commonly known as: CRESTOR  Take 1 tablet (10 mg total) by mouth once daily.     TRELEGY ELLIPTA 100-62.5-25 mcg Dsdv  Generic drug: fluticasone-umeclidin-vilanter     TYLENOL PM EXTRA STRENGTH  mg Tab  Generic drug: diphenhydrAMINE-acetaminophen         * This list has 2 medication(s) that are the same as other medications prescribed for you. Read the directions carefully, and ask your doctor or other care provider to review them with you.                SOCIAL HISTORY:     Social History     Socioeconomic History    Marital status: Single     Spouse name: Not on file    Number of children: Not on file    Years of education: Not on file    Highest education level: Not on file   Occupational History    Not on file   Social Needs    Financial resource strain: Not on file    Food insecurity     Worry: Not on file     Inability: Not on file    Transportation needs     Medical: Not on file     Non-medical: Not on file   Tobacco Use    Smoking status: Former Smoker     Packs/day: 0.25     Years: 50.00     Pack years: 12.50     Types: Cigarettes     Quit date: 2017     Years since quittin.7    Smokeless tobacco: Never Used   Substance and Sexual Activity    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     Drug use: No    Sexual activity: Never   Lifestyle    Physical activity     Days per week: Not on file     Minutes per session: Not on file    Stress: Not at all   Relationships    Social connections     Talks on phone: Not on file  "    Gets together: Not on file     Attends Restorationism service: Not on file     Active member of club or organization: Not on file     Attends meetings of clubs or organizations: Not on file     Relationship status: Not on file   Other Topics Concern    Not on file   Social History Narrative    Not on file       FAMILY HISTORY:     Family History   Problem Relation Age of Onset    Cancer Mother         breast    Heart disease Mother     Breast cancer Mother     Cancer Father         lung-smoker     Cancer Sister         lung-smoker     Heart attack Sister     Cancer Maternal Grandmother     Heart disease Maternal Grandmother     Cancer Maternal Grandfather     Heart disease Maternal Grandfather     Cancer Paternal Grandmother     Cancer Paternal Grandfather     Cancer Sister         mets not sure where it started     COPD Sister     Kidney cancer Son        REVIEW OF SYSTEMS:   Review of Systems   Constitution: Positive for malaise/fatigue.   HENT: Negative.    Eyes: Negative.    Cardiovascular: Positive for dyspnea on exertion.   Respiratory: Negative.    Endocrine: Negative.    Hematologic/Lymphatic: Negative.    Skin: Negative.    Musculoskeletal: Negative.    Gastrointestinal: Negative.    Genitourinary: Negative.    Neurological: Negative.    Psychiatric/Behavioral: Negative.    Allergic/Immunologic: Negative.        A 10 point review of systems was performed and all the pertinent positives have been mentioned. Rest of review of systems was negative.        PHYSICAL EXAM:     Vitals:    08/20/20 0828   BP: 132/66   Pulse: 67    Body mass index is 32.02 kg/m².  Weight: 82 kg (180 lb 12.4 oz)   Height: 5' 3" (160 cm)     Physical Exam   Constitutional: She is oriented to person, place, and time. She appears well-developed and well-nourished.   HENT:   Head: Normocephalic.   Eyes: Pupils are equal, round, and reactive to light.   Neck: Normal range of motion. Neck supple.   Cardiovascular: Normal " rate and regular rhythm.   Pulmonary/Chest: Effort normal and breath sounds normal.   Abdominal: Soft. Normal appearance and bowel sounds are normal. There is no abdominal tenderness.   Musculoskeletal: Normal range of motion.   Neurological: She is alert and oriented to person, place, and time.   Skin: Skin is warm.   Psychiatric: She has a normal mood and affect.         DATA:     Laboratory:  CBC:  Recent Labs   Lab 06/16/20  0926 07/21/20  0745 08/07/20  1125   WBC 6.85 5.13 4.74   Hemoglobin 13.9 13.0 13.8   Hematocrit 42.8 41.0 42.4   Platelets 217 253 199       CHEMISTRIES:  Recent Labs   Lab 08/16/19  0727 09/06/19  0734  02/28/20  0720 06/16/20  0926 07/21/20  0745 08/07/20  1125   Glucose 137 H 138 H   < > 134 H 120 H 140 H 106   Sodium 143 142   < > 144 139 142 137   Potassium 4.4 3.6   < > 4.6 3.6 3.8 3.7   BUN, Bld 12 11   < > 14 19 14 12   Creatinine 0.8 0.8   < > 0.8 1.1 0.8 0.7   eGFR if African American >60 >60   < > >60 58 A >60 >60   eGFR if non African American >60 >60   < > >60 50 A >60 >60   Calcium 10.1 9.2   < > 9.7 9.2 9.3 9.3   Magnesium 1.8 1.8  --  1.8  --   --   --     < > = values in this interval not displayed.       CARDIAC BIOMARKERS:  Recent Labs   Lab 11/29/17  1153 06/11/18  1345 07/20/19  1249   Troponin I 0.193 H <0.006 0.012       COAGS:  Recent Labs   Lab 11/29/17  0304 02/09/18  1020 07/20/19  1249   INR 1.0 1.0 1.0       LIPIDS/LFTS:  Recent Labs   Lab 03/04/19  0728  02/03/20  0730 02/28/20  0720 06/16/20  0926 07/21/20  0745   Cholesterol 166  --  125  --   --   --    Triglycerides 192 H  --  117  --   --   --    HDL 56  --  48  --   --   --    LDL Cholesterol 71.6  --  53.6 L  --   --   --    Non-HDL Cholesterol 110  --  77  --   --   --    AST 18   < > 19 19 18 17   ALT 19   < > 20 22 24 15    < > = values in this interval not displayed.       Hemoglobin A1C   Date Value Ref Range Status   02/03/2020 6.1 (H) 4.0 - 5.6 % Final     Comment:     ADA Screening  Guidelines:  5.7-6.4%  Consistent with prediabetes  >or=6.5%  Consistent with diabetes  High levels of fetal hemoglobin interfere with the HbA1C  assay. Heterozygous hemoglobin variants (HbS, HgC, etc)do  not significantly interfere with this assay.   However, presence of multiple variants may affect accuracy.     03/04/2019 6.1 (H) 4.0 - 5.6 % Final     Comment:     ADA Screening Guidelines:  5.7-6.4%  Consistent with prediabetes  >or=6.5%  Consistent with diabetes  High levels of fetal hemoglobin interfere with the HbA1C  assay. Heterozygous hemoglobin variants (HbS, HgC, etc)do  not significantly interfere with this assay.   However, presence of multiple variants may affect accuracy.     03/16/2018 5.8 (H) 4.0 - 5.6 % Final     Comment:     According to ADA guidelines, hemoglobin A1c <7.0% represents  optimal control in non-pregnant diabetic patients. Different  metrics may apply to specific patient populations.   Standards of Medical Care in Diabetes-2016.  For the purpose of screening for the presence of diabetes:  <5.7%     Consistent with the absence of diabetes  5.7-6.4%  Consistent with increasing risk for diabetes   (prediabetes)  >or=6.5%  Consistent with diabetes  Currently, no consensus exists for use of hemoglobin A1c  for diagnosis of diabetes for children.  This Hemoglobin A1c assay has significant interference with fetal   hemoglobin   (HbF). The results are invalid for patients with abnormal amounts of   HbF,   including those with known Hereditary Persistence   of Fetal Hemoglobin. Heterozygous hemoglobin variants (HbAS, HbAC,   HbAD, HbAE, HbA2) do not significantly interfere with this assay;   however, presence of multiple variants in a sample may impact the %   interference.         TSH  Recent Labs   Lab 11/29/17  0304   TSH 0.726       The ASCVD Risk score (Atkinsonnaun AVINA Jr., et al., 2013) failed to calculate for the following reasons:    The patient has a prior MI or stroke diagnosis            Cardiovascular Testing:    EKG: (personally reviewed tracing)  Sinus bradycardia      ASSESSMENT AND PLAN     Patient Active Problem List   Diagnosis    Benign hypertensive heart disease without heart failure    CAD (coronary artery disease)    History of breast cancer    Hyperlipidemia LDL goal <70    Old myocardial infarction    Stable angina    Atherosclerosis of aorta    Prediabetes    DNR (do not resuscitate)    COPD (chronic obstructive pulmonary disease)    Squamous cell carcinoma lung    History of lung cancer    Metastatic breast cancer    Aortic arch atherosclerosis    LOGAN (dyspnea on exertion)    Abnormal stress test    Chronic heart failure with preserved ejection fraction     Patient with a past medical history of coronary artery disease. .  Past medical history significant for lung cancer.  No significant coronary artery disease on recent coronary angiogram had luminal irregularities.    Heart failure with preserved ejection fraction    Squamous cell carcinoma of lung    Breast cancer    Aortic arch atherosclerosis        Follow-up in Cardiology Clinic in 4 m            Thank you very much for involving me in the care of your patient.  Please do not hesitate to contact me if there are any questions.      Guevara Skelton MD, FACC, [facility]  Interventional Cardiologist, [facility].           This note was dictated with the help of speech recognition software.  There might be un-intended errors and/or substitutions.

## 2020-08-28 ENCOUNTER — HOSPITAL ENCOUNTER (OUTPATIENT)
Dept: RADIOLOGY | Facility: HOSPITAL | Age: 75
Discharge: HOME | End: 2020-08-28
Attending: INTERNAL MEDICINE
Payer: MEDICARE

## 2020-08-28 ENCOUNTER — LAB VISIT (OUTPATIENT)
Dept: LAB | Facility: HOSPITAL | Age: 75
End: 2020-08-28
Attending: INTERNAL MEDICINE
Payer: MEDICARE

## 2020-08-28 DIAGNOSIS — C50.919 RECURRENT BREAST CANCER, UNSPECIFIED LATERALITY: ICD-10-CM

## 2020-08-28 DIAGNOSIS — R91.8 OTHER NONSPECIFIC ABNORMAL FINDING OF LUNG FIELD: ICD-10-CM

## 2020-08-28 LAB
ALBUMIN SERPL BCP-MCNC: 3.8 G/DL (ref 3.5–5.2)
ALP SERPL-CCNC: 63 U/L (ref 55–135)
ALT SERPL W/O P-5'-P-CCNC: 19 U/L (ref 10–44)
ANION GAP SERPL CALC-SCNC: 11 MMOL/L (ref 8–16)
AST SERPL-CCNC: 19 U/L (ref 10–40)
BASOPHILS # BLD AUTO: 0.04 K/UL (ref 0–0.2)
BASOPHILS NFR BLD: 0.8 % (ref 0–1.9)
BILIRUB SERPL-MCNC: 0.5 MG/DL (ref 0.1–1)
BUN SERPL-MCNC: 16 MG/DL (ref 8–23)
CALCIUM SERPL-MCNC: 9.3 MG/DL (ref 8.7–10.5)
CHLORIDE SERPL-SCNC: 100 MMOL/L (ref 95–110)
CO2 SERPL-SCNC: 30 MMOL/L (ref 23–29)
CREAT SERPL-MCNC: 0.8 MG/DL (ref 0.5–1.4)
DIFFERENTIAL METHOD: ABNORMAL
EOSINOPHIL # BLD AUTO: 0.1 K/UL (ref 0–0.5)
EOSINOPHIL NFR BLD: 1.4 % (ref 0–8)
ERYTHROCYTE [DISTWIDTH] IN BLOOD BY AUTOMATED COUNT: 13.1 % (ref 11.5–14.5)
EST. GFR  (AFRICAN AMERICAN): >60 ML/MIN/1.73 M^2
EST. GFR  (NON AFRICAN AMERICAN): >60 ML/MIN/1.73 M^2
GLUCOSE SERPL-MCNC: 143 MG/DL (ref 70–110)
HCT VFR BLD AUTO: 41.5 % (ref 37–48.5)
HGB BLD-MCNC: 13.6 G/DL (ref 12–16)
IMM GRANULOCYTES # BLD AUTO: 0.02 K/UL (ref 0–0.04)
IMM GRANULOCYTES NFR BLD AUTO: 0.4 % (ref 0–0.5)
LYMPHOCYTES # BLD AUTO: 0.9 K/UL (ref 1–4.8)
LYMPHOCYTES NFR BLD: 18.3 % (ref 18–48)
MCH RBC QN AUTO: 31.2 PG (ref 27–31)
MCHC RBC AUTO-ENTMCNC: 32.8 G/DL (ref 32–36)
MCV RBC AUTO: 95 FL (ref 82–98)
MONOCYTES # BLD AUTO: 0.6 K/UL (ref 0.3–1)
MONOCYTES NFR BLD: 10.9 % (ref 4–15)
NEUTROPHILS # BLD AUTO: 3.4 K/UL (ref 1.8–7.7)
NEUTROPHILS NFR BLD: 68.2 % (ref 38–73)
NRBC BLD-RTO: 0 /100 WBC
PLATELET # BLD AUTO: 236 K/UL (ref 150–350)
PMV BLD AUTO: 8.8 FL (ref 9.2–12.9)
POTASSIUM SERPL-SCNC: 3.9 MMOL/L (ref 3.5–5.1)
PROT SERPL-MCNC: 7.1 G/DL (ref 6–8.4)
RBC # BLD AUTO: 4.36 M/UL (ref 4–5.4)
SODIUM SERPL-SCNC: 141 MMOL/L (ref 136–145)
WBC # BLD AUTO: 5.03 K/UL (ref 3.9–12.7)

## 2020-08-28 PROCEDURE — 78815 PET IMAGE W/CT SKULL-THIGH: CPT | Mod: TC,HCNC

## 2020-08-28 PROCEDURE — 85025 COMPLETE CBC W/AUTO DIFF WBC: CPT | Mod: HCNC

## 2020-08-28 PROCEDURE — 36415 COLL VENOUS BLD VENIPUNCTURE: CPT | Mod: HCNC

## 2020-08-28 PROCEDURE — 78815 NM PET CT ROUTINE: ICD-10-PCS | Mod: 26,PS,HCNC, | Performed by: RADIOLOGY

## 2020-08-28 PROCEDURE — 80053 COMPREHEN METABOLIC PANEL: CPT | Mod: HCNC

## 2020-08-28 PROCEDURE — 78815 PET IMAGE W/CT SKULL-THIGH: CPT | Mod: 26,PS,HCNC, | Performed by: RADIOLOGY

## 2020-08-28 PROCEDURE — A9552 F18 FDG: HCPCS | Mod: HCNC

## 2020-08-31 ENCOUNTER — INFUSION (OUTPATIENT)
Dept: INFUSION THERAPY | Facility: HOSPITAL | Age: 75
End: 2020-08-31
Attending: INTERNAL MEDICINE
Payer: MEDICARE

## 2020-08-31 ENCOUNTER — OFFICE VISIT (OUTPATIENT)
Dept: HEMATOLOGY/ONCOLOGY | Facility: CLINIC | Age: 75
End: 2020-08-31
Payer: MEDICARE

## 2020-08-31 VITALS
WEIGHT: 177.25 LBS | SYSTOLIC BLOOD PRESSURE: 128 MMHG | TEMPERATURE: 97 F | DIASTOLIC BLOOD PRESSURE: 82 MMHG | HEIGHT: 63 IN | BODY MASS INDEX: 31.41 KG/M2 | OXYGEN SATURATION: 98 % | HEART RATE: 59 BPM

## 2020-08-31 VITALS — RESPIRATION RATE: 18 BRPM

## 2020-08-31 DIAGNOSIS — C34.91 SQUAMOUS CELL CARCINOMA OF RIGHT LUNG: ICD-10-CM

## 2020-08-31 DIAGNOSIS — C34.90 SQUAMOUS CELL CARCINOMA LUNG: Primary | ICD-10-CM

## 2020-08-31 DIAGNOSIS — C50.919 RECURRENT BREAST CANCER, UNSPECIFIED LATERALITY: Primary | ICD-10-CM

## 2020-08-31 DIAGNOSIS — J44.9 CHRONIC OBSTRUCTIVE PULMONARY DISEASE, UNSPECIFIED COPD TYPE: ICD-10-CM

## 2020-08-31 PROCEDURE — 1159F PR MEDICATION LIST DOCUMENTED IN MEDICAL RECORD: ICD-10-PCS | Mod: HCNC,S$GLB,, | Performed by: INTERNAL MEDICINE

## 2020-08-31 PROCEDURE — 3074F SYST BP LT 130 MM HG: CPT | Mod: HCNC,CPTII,S$GLB, | Performed by: INTERNAL MEDICINE

## 2020-08-31 PROCEDURE — 99214 OFFICE O/P EST MOD 30 MIN: CPT | Mod: HCNC,S$GLB,, | Performed by: INTERNAL MEDICINE

## 2020-08-31 PROCEDURE — 3079F PR MOST RECENT DIASTOLIC BLOOD PRESSURE 80-89 MM HG: ICD-10-PCS | Mod: HCNC,CPTII,S$GLB, | Performed by: INTERNAL MEDICINE

## 2020-08-31 PROCEDURE — 99499 UNLISTED E&M SERVICE: CPT | Mod: HCNC,S$GLB,, | Performed by: INTERNAL MEDICINE

## 2020-08-31 PROCEDURE — 1125F PR PAIN SEVERITY QUANTIFIED, PAIN PRESENT: ICD-10-PCS | Mod: HCNC,S$GLB,, | Performed by: INTERNAL MEDICINE

## 2020-08-31 PROCEDURE — 99214 PR OFFICE/OUTPT VISIT, EST, LEVL IV, 30-39 MIN: ICD-10-PCS | Mod: HCNC,S$GLB,, | Performed by: INTERNAL MEDICINE

## 2020-08-31 PROCEDURE — 25000003 PHARM REV CODE 250: Mod: HCNC | Performed by: INTERNAL MEDICINE

## 2020-08-31 PROCEDURE — 3074F PR MOST RECENT SYSTOLIC BLOOD PRESSURE < 130 MM HG: ICD-10-PCS | Mod: HCNC,CPTII,S$GLB, | Performed by: INTERNAL MEDICINE

## 2020-08-31 PROCEDURE — 99999 PR PBB SHADOW E&M-EST. PATIENT-LVL III: CPT | Mod: PBBFAC,HCNC,, | Performed by: INTERNAL MEDICINE

## 2020-08-31 PROCEDURE — 3079F DIAST BP 80-89 MM HG: CPT | Mod: HCNC,CPTII,S$GLB, | Performed by: INTERNAL MEDICINE

## 2020-08-31 PROCEDURE — 3008F PR BODY MASS INDEX (BMI) DOCUMENTED: ICD-10-PCS | Mod: HCNC,CPTII,S$GLB, | Performed by: INTERNAL MEDICINE

## 2020-08-31 PROCEDURE — 1159F MED LIST DOCD IN RCRD: CPT | Mod: HCNC,S$GLB,, | Performed by: INTERNAL MEDICINE

## 2020-08-31 PROCEDURE — 99499 RISK ADDL DX/OHS AUDIT: ICD-10-PCS | Mod: HCNC,S$GLB,, | Performed by: INTERNAL MEDICINE

## 2020-08-31 PROCEDURE — 96523 IRRIG DRUG DELIVERY DEVICE: CPT | Mod: HCNC

## 2020-08-31 PROCEDURE — 3008F BODY MASS INDEX DOCD: CPT | Mod: HCNC,CPTII,S$GLB, | Performed by: INTERNAL MEDICINE

## 2020-08-31 PROCEDURE — A4216 STERILE WATER/SALINE, 10 ML: HCPCS | Mod: HCNC | Performed by: INTERNAL MEDICINE

## 2020-08-31 PROCEDURE — 1125F AMNT PAIN NOTED PAIN PRSNT: CPT | Mod: HCNC,S$GLB,, | Performed by: INTERNAL MEDICINE

## 2020-08-31 PROCEDURE — 99999 PR PBB SHADOW E&M-EST. PATIENT-LVL III: ICD-10-PCS | Mod: PBBFAC,HCNC,, | Performed by: INTERNAL MEDICINE

## 2020-08-31 PROCEDURE — 1101F PT FALLS ASSESS-DOCD LE1/YR: CPT | Mod: HCNC,CPTII,S$GLB, | Performed by: INTERNAL MEDICINE

## 2020-08-31 PROCEDURE — 1101F PR PT FALLS ASSESS DOC 0-1 FALLS W/OUT INJ PAST YR: ICD-10-PCS | Mod: HCNC,CPTII,S$GLB, | Performed by: INTERNAL MEDICINE

## 2020-08-31 PROCEDURE — 63600175 PHARM REV CODE 636 W HCPCS: Mod: HCNC | Performed by: INTERNAL MEDICINE

## 2020-08-31 RX ORDER — HEPARIN 100 UNIT/ML
500 SYRINGE INTRAVENOUS
Status: DISCONTINUED | OUTPATIENT
Start: 2020-08-31 | End: 2020-08-31 | Stop reason: HOSPADM

## 2020-08-31 RX ORDER — SODIUM CHLORIDE 0.9 % (FLUSH) 0.9 %
10 SYRINGE (ML) INJECTION
Status: DISCONTINUED | OUTPATIENT
Start: 2020-08-31 | End: 2020-08-31 | Stop reason: HOSPADM

## 2020-08-31 RX ADMIN — HEPARIN 500 UNITS: 100 SYRINGE at 09:08

## 2020-08-31 RX ADMIN — Medication 10 ML: at 09:08

## 2020-08-31 NOTE — Clinical Note
Give pt printed copy of PET/CT  Direct pt to get CD of recent PET/CT  Fax copy of recent copy to Dr. LITZY [facility]    Cbc,cmp prior to f/u 6 weeks

## 2020-08-31 NOTE — PLAN OF CARE
Patient arrived to infusion center for monthly port flush. Tolerated well. VSS. Discharge instructions provided. Patient ambulated unassisted off unit, in NAD at time of discharge.

## 2020-08-31 NOTE — PROGRESS NOTES
Subjective:       Patient ID: Ade Castellano is a 74 y.o. female.    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   Pathology showed a 1.15 cm, grade 3 infiltrating ductal carcinoma.  One sentinel lymph node was negative for malignancy.  Margins were negative and the closest margin was 1 cm.  She was staged a little pT1c little pN0 cM0 stage IA.  She declined adjuvant chemo therapy.  She completed post operative radiation therapy at 400 cGy per fraction to 4000 cGy 9/2014         Pt hospitalized 11/2017   Ms. Castellano was admitted for acute on chronic respiratory failure requiring intubation and ventilation. Has large lung mass in right lung with probable post obstructive pneumonia resulting in COPD exacerbation. But also, patient had ran out of home O2 prior to onset of symptoms, likely contributing to presentation. Initiated on broad spectrum abx, IV steroids, duo-nebs and pulmonology consulted for ventilator co-management. Successfully extubated to BiPAP on 11/30. Then de-escalated to low flow nasal cannula. Abx tailored to augmentin for broad coverage, including anaerobic bacteria /     CTA chest 11/29/2017 revealed   Large right hilar mass with involvement of adjacent segmental pulmonary arterial branches and bronchi with associated postobstructive atelectasis and volume loss in the right middle lobe with adjacent ground glass opacity.  Findings highly concerning for underlying neoplastic process. Mediastinal and axillary adenopathy, with a right axillary lymph node measuring up to 2.0  cm in short axis diameter. No definite evidence of pulmonary thromboembolism.    She is followed by Pulmonology   She underwent rt axillary LN bx at outside facility- [place]  Pathology revealed metastatic poorly differentiated carcinoma of unknown primary site  TTF-1 negative, napsin negative  , cytokeratin 7 positive, cytokeratin 20 negative, p63 negative, cytokeratin 5/6 focal dim positivity    She next underwent lung bx at [facility] 1/31/2018   Pathology revealed benign lung tissue    She underwent Repeat Right Lung Bx 2/14/2018 Pathology reveals Squamous cell carcinoma  PD-L1 10% low expression  EGFR NEG  ALK NEG  BRAF NEG      Outside slides axillary LN specimen  requested for review/comparison to lung bx findings     1) Right Lung Bx 2/14/2018  Supplemental Diagnosis  Immunohistochemical stains show strong nuclear staining for p63 in essentially all tumor cells and very strong  cytoplasmic and membrane staining for CK5/6, also in essentially all tumor cells. TTF-1 and CK7 are negative  within tumor cells but do stain native pulmonary elements present within the biopsy. A stain for mucicarmine is  negative. Positive and negative controls function appropriately.  Final diagnosis: Specimen submitted as right lung biopsy  -Squamous cell carcinoma  (Electronically Signed: 2018-02-20 09:12:07 )  Diagnosed by: Phong Thompson  FINAL PATHOLOGIC DIAGNOSIS  Fragments of pulmonary parenchyma (submitted as right lung biopsy):    FINAL PATHOLOGIC DIAGNOSIS 1. Lymph node, right axilla, biopsy, review of 10 outside slides [facility], JS 18 1 [id number],  collected January 11, 2018: Metastatic poorly differentiated carcinoma (see comment).  The histologic section shows fibrous stroma infiltrated by nest of poorly differentiated malignant cells including  occasional dyskeratotic cells morphologically suggestive of metastatic squamous cell carcinoma.  Immunohistochemical stains are  nonspecific; the cells are positive for cytokeratin 7 and cytokeratin 5/6 (focal) and  negative for cytokeratin 20, TTF-1, p63, GCDFP, mammoglobin, and CEA      PET/CT  4/19/2018 revealed there has been a excellent response to therapy.  At least 90% reduction in malignant activity.    She s/p  cycle 6 of carboplatin/Abraxane completed 7/2018        PET/CT 2/21/2019 increased size and irregularity of the right axillary lymph node with increased FDG avidity     MAMMO/US rt breast 2/2019 revealed suspicious findings rt axilla LN.  Biopsy of the lymph node measuring 1.4 x 1.1 x 1.3 recommended    Pathology 3/11/2019   FINAL PATHOLOGIC DIAGNOSIS  Right axilla, mass, core biopsy:  Positive for poorly differentiated carcinoma.  he staining profile of the current axillary mass match more closely with the previous  breast carcinoma (due to the p63 negativity in both the 2014 breast cancer and the current axillary mass lesion but  p63 positivity in the lung mass from 2018).  This staining profile, together with the comparison with the patient's prior breast tumor and prior lung tumor supports  a diagnosis of poorly differentiated carcinoma and the malignancy appears morphologically similar to the prior  malignancies from both sites, but the staining profile in this small sample from the axillary mass more closely  correlates with the prior breast malignancy. Pathologic subclassification of this malignancy's primary location is not  definitive and clinical correlation is recommended definitively decide on possible primary location in this patient's  axillary mass sample.  Supplemental (2):  Additional immunohistochemical staining for progesterone receptor and HER2 are completed per clinician request  with following results:  Progesterone receptor: Negative; 0% nuclear tumor cell staining.  HER2: Negative; stain score = 0.  Supplemental (3):    Slides sent to [facility] for outside review  It was determined that agree with   interpretation of this case. In my opinion, the lung tumor corresponds to a squamous cell  carcinoma and appears to be unrelated to the invasive ductal carcinoma of the breast. The axillary mass, in my  opinion, corresponds to metastases of the breast primary. Although it is a poorly differentiated carcinoma, the  immunophenotype is most consistent with the one that the breast primary exhibited, and is inconsistent with a  metastatic squamous cell carcinoma. I      Further testing sent for cancer type ID also sent and results have revealed molecular diagnosis testing revealed head and neck salivary gland carcinoma probability 84% breast adenocarcinoma could not be excluded with a 12% probability      She is s/p AC q21d x 4 cycles completed 9/6/2019   PET/CT 9/19/2019 shows disease response with interval decrease in size and uptake of several right axillary lymph nodes and a supraclavicular lymph node.  Mild residual activity may indicate viable tumor.  No new hypermetabolic findings worrisome for malignancy.    Pt followed by Rad/Onc  She was presented at [facility] tumor board and it was determined to proceed with radiation only  She  completed  RT 12/16/2019   The right axilla and right supraclavicular region was treated at 200 cGy per fraction to 4400 cGy. The PET(+) disease in the right axilla and right supraclavicular fossa will be boosted at 200 cGy per fraction to 6000 cGy total dose.    Interval Hx 6/16/2020 She continues with LOGAN  She was prescribed Levaquin and steroids per PCP for COPD exacerbation  She reports cough has improved  She is followed by Dr. Katz, pulmonology  Further w/u planned including 2-D echo, CTA chest     Interval Hx: 7/21/2020   Pt reports she underwent extensive workup and it was determined that worsening cough/SOB likely from radiation  No new issues  Appetite and weight stable    Interval Hx : 8/31/2020  No new issues  Continues with intermittent chronic cough  Chronic mild  LOGAN  No fevers  Mild arthralgias localized rt shoulder    Recent PET/CT imaging shows Mildly increased size and mild radiotracer uptake within a small right axillary lymph node, equivocal for metastatic versus reactive etiology.  Recommend short-term follow-up to assess for stability versus evolution. Otherwise, no extrathoracic hypermetabolic tumor identified.          PrevHx:She had an abnormal mammogram 6/4/2014 which  Revealed a round solid mass 6mm in rt breast.  She then underwent U/S guided core bx of rt breast mass on 6/17/2014.  Pathology revealed infiltrating ductal carcinoma Grade 3 with tumor  Present in thin-walled spaces suggestive of lymphatic spaces. Hormone  Receptor status on tumor specimen revealed ER negative 0% ,  CA negative 0% and Her 2 jose maria negative.  She subsequently underwent rt segmental mastectomy and SLN bx  On 7/11/2014. Pathology of rt breast lumpectomy revealed invasive  Ductal carcinoma with micropapillary pattern ( Invasive micropapillary  Ca) with max tumor dimension 11.5mm with suggestion of tumor in   Thin walled spaces c/w lymphovascular involvement. Surgical  Margins free of tumor, grade 3, ER/CA neg , Her 2 jose maria neg   With [place] Lymph node negative for Neoplasm.   Pathologic staging [id number](i-)  Her mother was diagnosed with breast cancer in her 50's/  She has no family history of ovarian cancer.           Review of Systems   Constitutional: Negative for appetite change, fever and unexpected weight change.   HENT: Negative for mouth sores and rhinorrhea.    Eyes: Negative for visual disturbance.   Respiratory: Positive for cough and shortness of breath (improved).    Cardiovascular: Negative for chest pain.   Gastrointestinal: Negative for abdominal pain and diarrhea.   Genitourinary: Negative for frequency.   Musculoskeletal: Positive for arthralgias. Negative for back pain.   Skin: Negative for rash.   Neurological: Negative for dizziness and headaches.   Hematological:  "Negative for adenopathy.   Psychiatric/Behavioral: The patient is not nervous/anxious.        Objective:          Vitals:    08/31/20 0803   BP: 128/82   BP Location: Left arm   Patient Position: Sitting   BP Method: Medium (Automatic)   Pulse: (!) 59   Temp: 97.4 °F (36.3 °C)   TempSrc: Oral   SpO2: 98%   Weight: 80.4 kg (177 lb 4 oz)   Height: 5' 3" (1.6 m)   '    Physical Exam   Constitutional: She is oriented to person, place, and time. She appears well-developed and well-nourished.   HENT:   Head: Normocephalic.   Mouth/Throat: Oropharynx is clear and moist. No oropharyngeal exudate.   Eyes: Conjunctivae and lids are normal. Pupils are equal, round, and reactive to light. No scleral icterus.   Neck: Normal range of motion. Neck supple. No thyromegaly present.   Cardiovascular: Normal rate, regular rhythm and normal heart sounds.    No murmur heard.  Pulmonary/Chest: Breath sounds normal. She has no wheezes. She has no rales.   Abdominal: Soft. Bowel sounds are normal. She exhibits no distension and no mass. There is no hepatosplenomegaly. There is no tenderness. There is no rebound and no guarding.   Musculoskeletal: Normal range of motion. She exhibits no edema and no tenderness.   Lymphadenopathy:     She has no cervical adenopathy.     She has no axillary adenopathy.        Right: No supraclavicular adenopathy present.        Left: No supraclavicular adenopathy present.   Neurological: She is alert and oriented to person, place, and time. No cranial nerve deficit. Coordination normal.   Skin: Skin is warm and dry. No ecchymosis, no petechiae and no rash noted. No erythema.   Psychiatric: She has a normal mood and affect.             CT a/p w/contrast 11/29/2018   1. No acute abnormality identified within the abdomen and pelvis.    2.  There are a few nonspecific prominent lymph nodes in the upper abdomen including a periesophageal lymph node which measures 1.0 cm in short axis diameter.    3.  Significant " abdominal aortic atherosclerosis and abdominal aorta ectasia.    4.  Additional findings as above.    PET/CT 1/26/2018   1.  Intense FDG uptake within the known right infrahilar lung mass, compatible with malignancy.  It is unclear if this represents primary lung malignancy or metastatic breast cancer.    2.  Intensely hypermetabolic right axillary, retropectoral, and lower right cervical lymph nodes, compatible with metastases.    3.  Abnormal FDG uptake along right breast skin thickening, new from prior chest CTA and mammogram.  This may relate to localized edema/inflammation, though correlation with physical exam and mammography are recommended to exclude underlying inflammatory carcinoma.      MRI Brain w w/out contrast 2/9/2018  1.  No evidence of intracranial metastases.  2.  Sinus disease       Rt axillary LN bx at outside facility- on 1/16/2018 at [facility]  Pathology revealed metastatic poorly differentiated carcinoma of unknown primary  site 1/16/2018 at [facility]  TTF-1 negative, napsin negative  , cytokeratin 7 positive, cytokeratin 20 negative, p63 negative, cytokeratin 5/6 focal dim positivity    LUNG BIOPSY 1/31/2018  Pathology revealed benign lung tissue         SPECIMEN  1) Right Lung Bx 2/14/2018  Supplemental Diagnosis  Immunohistochemical stains show strong nuclear staining for p63 in essentially all tumor cells and very strong  cytoplasmic and membrane staining for CK5/6, also in essentially all tumor cells. TTF-1 and CK7 are negative  within tumor cells but do stain native pulmonary elements present within the biopsy. A stain for mucicarmine is  negative. Positive and negative controls function appropriately.  Final diagnosis: Specimen submitted as right lung biopsy  -Squamous cell carcinoma  (Electronically Signed: 2018-02-20 09:12:07 )  Diagnosed by: Phong Thompson  FINAL PATHOLOGIC DIAGNOSIS  Fragments of pulmonary parenchyma (submitted as right lung  biopsy):    FINAL PATHOLOGIC DIAGNOSIS 1. Lymph node, right axilla, biopsy, review of 10 outside slides [facility], JS 18 1 [id number],  collected January 11, 2018: Metastatic poorly differentiated carcinoma (see comment).  The histologic section shows fibrous stroma infiltrated by nest of poorly differentiated malignant cells including  occasional dyskeratotic cells morphologically suggestive of metastatic squamous cell carcinoma.  Immunohistochemical stains are nonspecific; the cells are positive for cytokeratin 7 and cytokeratin 5/6 (focal) and  negative for cytokeratin 20, TTF-1, p63, GCDFP, mammoglobin, and CEA        PET/CT 2/21/2019  Increased size and irregularity of the right axillary lymph node with increased FDG avidity.  Although this would be atypical in location for lung carcinoma, the increased size and avidity must be concerning for metastatic disease in this patient with history of squamous cell lung cancer.     US Rt breast and mammo 2/26/2019  Impression:  Right  Lymph Node: Right axilla lymph node. Assessment: 4 - Suspicious finding. Biopsy is recommended.      BI-RADS Category:   Right: 4 - Suspicious  Overall: 4 - Suspicious     Recommendation:  Biopsy is recommended. Biopsy of the lymph node measuring 1.4 x 1.1 x 1.3 recommended , the one with the round shape configuration, corresponding to the most recent PET CT finding.         Pathology 3/11/2019   FINAL PATHOLOGIC DIAGNOSIS  Right axilla, mass, core biopsy:  Positive for poorly differentiated carcinoma.  See comment.  Comment:  The biopsy from the right axilla mass shows a poorly differentiated carcinoma in background lymphoid tissue  with enlarged cells showing increased nuclear size, prominent nucleoli, moderate amounts of cytoplasm and mitotic  figures. Immunostains are completed and reveal the tumor cells to stain positively with cytokeratin AE 1/AE3, CK  5/6 and CK 7. The tumor cells are negative for CK 20, Estrogen  receptor, p63 and TTF-1. All stains have  satisfactory positive and negative controls. The patient's prior cases will be reviewed and an additional stain for  JUAREZ-3 is pending in an attempt to pinpoint the primary site of this malignancy and results will follow in a  supplemental report.      Supplemental Diagnosis  3/11/2019  The current axillary mass is compared to the patient's prior right lung biopsy (case number [id number]) and the  current axillary mass is morphologically similar to the lung malignancy. Also, the current axillary mass is compared  to the patient's prior breast resection (Case number [id number]) and appears similar as well. P63 is negative in the  [id number] tumor and CK 5/6 shows scattered positive staining in the [id number] tumor.  Immunostain for JUAREZ-3 is completed and shows only rare weak to moderate staining within the tumor cell nuclei  with satisfactory positive and negative controls. This stain is positive in the surrounding lymphocytes. This staining  pattern is non-specific and does not definitively differentiate between a lung and breast primary malignancy in this  right axilla mass biopsy. The staining profile of the current axillary mass match more closely with the previous  breast carcinoma (due to the p63 negativity in both the 2014 breast cancer and the current axillary mass lesion but  p63 positivity in the lung mass from 2018).  This staining profile, together with the comparison with the patient's prior breast tumor and prior lung tumor supports  a diagnosis of poorly differentiated carcinoma and the malignancy appears morphologically similar to the prior  malignancies from both sites, but the staining profile in this small sample from the axillary mass more closely  correlates with the prior breast malignancy. Pathologic subclassification of this malignancy's primary location is not  definitive and clinical correlation is recommended definitively decide on possible primary  location in this patient's  axillary mass sample.  Supplemental (2):  Additional immunohistochemical staining for progesterone receptor and HER2 are completed per clinician request  with following results:  Progesterone receptor: Negative; 0% nuclear tumor cell staining.  HER2: Negative; stain score = 0.  Supplemental (3):  [place] DIAGNOSIS:  FINAL DIAGNOSIS  Breast, right, needle core biopsy ([id number]; 06/17/2014): Invasive ductal carcinoma, Jaiden grade III (of  III), with focal micropapillary features.  Immunohistochemical stains performed at the referring institution show that the tumor cells have the following  phenotype: - Estrogen receptor: Negative (0% tumor cells staining). - Progesterone receptor: Negative (0% tumor  cells staining). - HER2: Negative (score 0).  Lymph nodes, right, sentinel biopsy and lumpectomy ([id number]; 07/11/2014):  1. Right sentinel lymph node: A single (1) lymph node is negative for metastatic carcinoma.  Immunohistochemical stains performed by the referring institution and reviewed at [facility] for cytokeratin are  negative, confirming the diagnosis.  2. Right breast: Invasive ductal carcinoma with micropapillary features, per report 11.5 mm in greatest dimension.  Foci suspicious for lymphovascular invasion identified. Margins of resection are free of tumor.  Immunohistochemical stains performed by the referring institution and reviewed at [facility] show that the tumor  cells are negative for p63 and show patchy positivity for CK 5/6.  Lung, right, needle core biopsy ([id number]; 02/14/2018): Squamous cell carcinoma.    Immunohistochemical stains performed by the referring institution show the tumor cells are strongly positive for p63  and CK 5/6, while negative for TTF-1 and CK7. These result support the diagnosis. Axilla, right, needle core biopsy  ([id number]; 03/11/2019): Lymph node positive for metastatic carcinoma, most consistent with breast primary  (see  comment).  Immunohistochemical stains performed by the referring institution and reviewed at [facility] show that the tumor  cells are negative for p63, focally positive for CK 5/6, focally and weakly positive for JUAREZ-3, and strongly positive  for CK7. They are also negative for estrogen receptor, progesterone receptor, and HER2 JAM (score 0).  COMMENT:  Thank you for allowing me to review the case of this 73-year-old lady who recently underwent needle core biopsies  of a right axillary mass and who has a previous diagnosis of an invasive ductal carcinoma of the right breast, as well  as a squamous cell carcinoma of the lung. I am reviewing this case because of my special interest in breast  pathology.  I agree with your interpretation of this case. In my opinion, the lung tumor corresponds to a squamous cell  carcinoma and appears to be unrelated to the invasive ductal carcinoma of the breast. The axillary mass, in my  opinion, corresponds to metastases of the breast primary. Although it is a poorly differentiated carcinoma, the  immunophenotype is most consistent with the one that the breast primary exhibited, and is inconsistent with a  metastatic squamous cell carcinoma. I did share the case with Dr. Anabel Stone, one of our pulmonary pathologists,  and she concurs with this interpretation.  Note: Report attached.  Performing location:  [address] [address]    CT neck/chest/abd/pelvis w/contrast 4/26/2019   The previously described right hilar mass is no longer visible.  There is persistent volume loss in the right middle lobe this appears similar to the prior PET-CT February 21, 2019.    Persistent abnormal right axillary node today measuring about 15 mm compared 18 mm prior.  The positioning of the adjacent nodes is slightly different likely accounting for the slight difference in measurement.    Cluster of tree-in-bud nodular densities left  lower lobe series 2, image 93.  This may be related to small airways disease though serial follow-up suggested.      PET/CT 6/20/2019   New and worsening hypermetabolic lymph nodes concerning for metastatic breast cancer as described above.  Tissue sampling would be required for definitive diagnosis.      2-D echo 6/27/2019   · Normal left ventricular systolic function. The estimated ejection fraction is 55%  · Concentric left ventricular remodeling.  · Normal LV diastolic function.  · Normal right ventricular systolic function.  · Moderate left atrial enlargement.  · Mild right atrial enlargement.  · Mild aortic regurgitation.  · Mild mitral regurgitation.  · Mild tricuspid regurgitation.  · Normal central venous pressure (3 mm Hg).  · The estimated PA systolic pressure is 25 mm Hg      PET/CT 9/19/2019   Favorable response to therapy with interval decrease in size and uptake of several right axillary lymph nodes and a supraclavicular lymph node.  Mild residual activity may indicate viable tumor.    No new hypermetabolic findings worrisome for malignancy.    PET/CT 2/28/2020  1.  No evidence of hypermetabolic tumor.  Continued improvement of the right axilla status post adjuvant radiation.    2.  No new hypermetabolic lesions      CTA 6/17/2020  1. No evidence of pulmonary embolus. No aortic dissection.   2. There is no evidence for new or progressive disease in the chest as compared to PET/CT 6/20/2019 in this patient with history of right breast cancer and right lung neoplasm. The patient does have a more recent PET/CT 2/28/2020 from an outside facility per the electronic medical record although images are not available for direct comparison. Correlation with these images may be beneficial. Continued long-term surveillance recommended.  3. Stable appearance of the treated tumor in the right hilum/middle lobe.  4. Stable treated right breast cancer and axillary adenopathy without evidence for developing breast  mass or new right axillary adenopathy.  5. Stable tiny nodularity/tree-in-bud densities in the left lung, probably postinfectious or inflammatory.  6. Wall thickening of the esophagus may be correlated for esophagitis. Possible tiny hiatus hernia.  7. Slight bronchial wall thickening may be correlated for bronchitis.  8. Mild to moderate emphysema as before.  9. Reflux of contrast into the IVC and hepatic veins is nonspecific but may be correlated for elevated right heart pressures.  10. Coronary calcifications and/or stents similar to prior.    Echo 5/21/2020  Technically difficult study.   Normal left ventricular systolic function.   Left ventricular ejection fraction is estimated at 55%.   Severe mitral annular calcification.   Mild mitral valve regurgitation.   Aortic valve sclerosis without stenosis or regurgitation.     PFTs 6/17/2020  Spirometry reveals a very severe obstructive lung defect, which does not significantly improve post bronchodilators. Lung volumes revealed air trapping. Diffusing capacity was moderately impaired.        Del 6/26/2020  BI-RADS Category:   Overall: 2 - Benign      PET/CT 8/28/2020  Impression:     Mildly increased size and mild radiotracer uptake within a small right axillary lymph node, equivocal for metastatic versus reactive etiology.  Recommend short-term follow-up to assess for stability versus evolution.     Otherwise, no extrathoracic hypermetabolic tumor identified.      Assessment:       1. Recurrent breast cancer, unspecified laterality    2.  History of Squamous cell carcinoma of right lung    3. Chronic obstructive pulmonary disease, unspecified COPD type        Plan:     1-3    Pt with hx of Stage 1A invasive ductal carcinoma rt breast s/p rt segmental mastectomy and SLN bx 7/11/2014 ER/ME neg HER 2 jose maria neg [id number]([id number]).  S/p adjuvant RT completed 9/2014 Pt declined adjuvant chemo  Follow-up Mammo 6/12/2017 No mammographic evidence of malignancy.  Pt  hospitalized  11/2017 with acute-on-chronic  resp failure  Abnormal CT imaging revealing Large right hilar mass with involvement of  adjacent segmental pulmonary arterial branches and bronchi with associated postobstructive atelectasis  and involvement of mediastinal and axillary adenopathy   S/p rt axillary LN bx ( outside facility) - metastatic poorly differentiated carcinoma of unknown primary  site  status post  lung biopsy 1/31/2017 -benign lung tissue  PET/CT 1/26/2018   Intense FDG uptake within the known right infrahilar lung mass, compatible with malignancy.   Intensely hypermetabolic right axillary, retropectoral, and lower right cervical lymph nodes, compatible with metastases.  Abnormal FDG uptake along right breast skin thickening, new from prior chest CTA and mammogram.    Repeat lung bx 2/14/2018 revealed Squamous Cell CA lung  PD-L1 10% low expression  EGFR NEG  ALK NEG    Pt treated for advanced squamous cell CA of lung   She s/p  cycle 6 of carboplatin/Abraxane Day1 completed 7/2/2018 ( day 15 held due to prolonged cytopenias)  She completed therapy with near CR     PET/CT 2/21/2019 showed increased size and irregularity of the right axillary lymph node with increased FDG avidity    t MAMMO/US rt breast 3/2019  reveals suspicious findings rt axilla LN.  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 It was determined The axillary mass, in my opinion, corresponded  to metastases of the breast primary. Although it is a poorly differentiated carcinoma, theimmunophenotype is most consistent with the one that the breast primary exhibited,  and is inconsistent with a metastatic squamous cell carcinoma.     CT imaging 4/26/2019 persistent rt axillary LAD, no other sites of disease     Pf followed by Rad/Onc regarding RT     Lymph node biopsy 3/11/2019 Right axilla, mass, core biopsy:  Positive for poorly differentiated carcinoma.   favor breast primary   Pt was discussed at multidisciplinary tumor board  D/w Pathologist    PET/CT 6/20/2019  New and worsening hypermetabolic lymph nodes concerning for metastatic breast cancer     Previously Discussed  imaging findings in detail with patient which reveals new and worsening hypermetabolic melena metabolic lymph nodes including hypermetabolic supraclavicular node.  Therefore, at treatment option will be systemic chemotherapy in light of the recent imaging findings.  She will be followed by her surgical oncologist Dr. Christopher      IT was determined to proceed with systemic chemotherapy   S/p  AC [id number] x 3 wks x 4 wks completed 9/6/2019   ( pt has not received in past)      PET/CT 9/19/2019 shows disease response with interval decrease in size and uptake of several right axillary lymph nodes and a supraclavicular lymph node.  Mild residual activity may indicate viable tumor.  No new hypermetabolic findings worrisome for malignancy.    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   She completed chemotherapy 9/6/2019  and has had a near complete response.  It was determined  Radiation will be given to the original areas of hypermetabolic activity in the right axilla and right supraclavicular region  Pt was presented at multidisciplinary tumor board    Pt completed  RT 12/16/2019      PET/CT 2/28/2020  No evidence of hypermetabolic tumor.  Continued improvement of the right axilla status post adjuvant radiation.    No new hypermetabolic lesions     Cough   Pulmonology workup as above  PFTs reveals severe  obstructive defect   PPI added  Pulmonary rehab       Discussed recent PET/CT imaging -which shows Mildly increased size and mild radiotracer uptake within a small right axillary lymph node, equivocal for metastatic versus reactive etiology.   no extrathoracic hypermetabolic tumor identified.    Plan close short-term follow-up     Follow-up 6 weeks with cbc,cmp prior to f/u     Advance Care Planning     Power of   I previously  initiated the process of advance care planning today and explained the importance of this process to the patient.  I introduced the concept of advance directives to the patient, as well. Then the patient received detailed information about the importance of designating a Health Care Power of  (HCPOA). She was also instructed to communicate with this person about their wishes for future healthcare, should she become sick and lose decision-making capacity. The patient has previously appointed a HCPOA. After our discussion, the patient has decided to complete a HCPOA and has appointed her son and niece and  I spent a total time of 16 minutes discussing this issue with the patient.         Cc: Swetha Katz M.D.         MD Tory Roberson MD Raymond Gould, MD

## 2020-09-18 ENCOUNTER — HOSPITAL ENCOUNTER (EMERGENCY)
Facility: HOSPITAL | Age: 75
Discharge: HOME | End: 2020-09-18
Attending: EMERGENCY MEDICINE
Payer: MEDICARE

## 2020-09-18 VITALS
WEIGHT: 175 LBS | OXYGEN SATURATION: 96 % | SYSTOLIC BLOOD PRESSURE: 106 MMHG | HEART RATE: 56 BPM | HEIGHT: 63 IN | DIASTOLIC BLOOD PRESSURE: 57 MMHG | BODY MASS INDEX: 31.01 KG/M2 | RESPIRATION RATE: 22 BRPM | TEMPERATURE: 98 F

## 2020-09-18 DIAGNOSIS — R42 VERTIGO: Primary | ICD-10-CM

## 2020-09-18 DIAGNOSIS — R42 DIZZINESS: ICD-10-CM

## 2020-09-18 DIAGNOSIS — R51.9 RIGHT-SIDED HEADACHE: ICD-10-CM

## 2020-09-18 LAB
ALBUMIN SERPL BCP-MCNC: 4.1 G/DL (ref 3.5–5.2)
ALP SERPL-CCNC: 51 U/L (ref 55–135)
ALT SERPL W/O P-5'-P-CCNC: 17 U/L (ref 10–44)
ANION GAP SERPL CALC-SCNC: 9 MMOL/L (ref 8–16)
AST SERPL-CCNC: 24 U/L (ref 10–40)
BASOPHILS # BLD AUTO: 0.03 K/UL (ref 0–0.2)
BASOPHILS NFR BLD: 0.6 % (ref 0–1.9)
BILIRUB SERPL-MCNC: 0.5 MG/DL (ref 0.1–1)
BUN SERPL-MCNC: 16 MG/DL (ref 8–23)
CALCIUM SERPL-MCNC: 9.5 MG/DL (ref 8.7–10.5)
CHLORIDE SERPL-SCNC: 103 MMOL/L (ref 95–110)
CHOLEST SERPL-MCNC: 139 MG/DL (ref 120–199)
CHOLEST/HDLC SERPL: 2.7 {RATIO} (ref 2–5)
CO2 SERPL-SCNC: 26 MMOL/L (ref 23–29)
CREAT SERPL-MCNC: 0.8 MG/DL (ref 0.5–1.4)
DIFFERENTIAL METHOD: ABNORMAL
EOSINOPHIL # BLD AUTO: 0.1 K/UL (ref 0–0.5)
EOSINOPHIL NFR BLD: 1.7 % (ref 0–8)
ERYTHROCYTE [DISTWIDTH] IN BLOOD BY AUTOMATED COUNT: 13.7 % (ref 11.5–14.5)
EST. GFR  (AFRICAN AMERICAN): >60 ML/MIN/1.73 M^2
EST. GFR  (NON AFRICAN AMERICAN): >60 ML/MIN/1.73 M^2
GLUCOSE SERPL-MCNC: 121 MG/DL (ref 70–110)
HCT VFR BLD AUTO: 42.1 % (ref 37–48.5)
HDLC SERPL-MCNC: 51 MG/DL (ref 40–75)
HDLC SERPL: 36.7 % (ref 20–50)
HGB BLD-MCNC: 13.7 G/DL (ref 12–16)
IMM GRANULOCYTES # BLD AUTO: 0.02 K/UL (ref 0–0.04)
IMM GRANULOCYTES NFR BLD AUTO: 0.4 % (ref 0–0.5)
INR PPP: 1 (ref 0.8–1.2)
LDLC SERPL CALC-MCNC: 49.8 MG/DL (ref 63–159)
LYMPHOCYTES # BLD AUTO: 0.8 K/UL (ref 1–4.8)
LYMPHOCYTES NFR BLD: 17.3 % (ref 18–48)
MCH RBC QN AUTO: 31.1 PG (ref 27–31)
MCHC RBC AUTO-ENTMCNC: 32.5 G/DL (ref 32–36)
MCV RBC AUTO: 96 FL (ref 82–98)
MONOCYTES # BLD AUTO: 0.4 K/UL (ref 0.3–1)
MONOCYTES NFR BLD: 8.8 % (ref 4–15)
NEUTROPHILS # BLD AUTO: 3.3 K/UL (ref 1.8–7.7)
NEUTROPHILS NFR BLD: 71.2 % (ref 38–73)
NONHDLC SERPL-MCNC: 88 MG/DL
NRBC BLD-RTO: 0 /100 WBC
PLATELET # BLD AUTO: 192 K/UL (ref 150–350)
PMV BLD AUTO: 9.3 FL (ref 9.2–12.9)
POCT GLUCOSE: 123 MG/DL (ref 70–110)
POTASSIUM SERPL-SCNC: 4.5 MMOL/L (ref 3.5–5.1)
PROT SERPL-MCNC: 7.2 G/DL (ref 6–8.4)
PROTHROMBIN TIME: 10.7 SEC (ref 9–12.5)
RBC # BLD AUTO: 4.4 M/UL (ref 4–5.4)
SODIUM SERPL-SCNC: 138 MMOL/L (ref 136–145)
TRIGL SERPL-MCNC: 191 MG/DL (ref 30–150)
TSH SERPL DL<=0.005 MIU/L-ACNC: 1.68 UIU/ML (ref 0.4–4)
WBC # BLD AUTO: 4.68 K/UL (ref 3.9–12.7)

## 2020-09-18 PROCEDURE — 93010 EKG 12-LEAD: ICD-10-PCS | Mod: HCNC,,, | Performed by: INTERNAL MEDICINE

## 2020-09-18 PROCEDURE — 80061 LIPID PANEL: CPT | Mod: HCNC

## 2020-09-18 PROCEDURE — G0425 PR INPT TELEHEALTH CONSULT 30M: ICD-10-PCS | Mod: HCNC,95,, | Performed by: PSYCHIATRY & NEUROLOGY

## 2020-09-18 PROCEDURE — 36000 PLACE NEEDLE IN VEIN: CPT | Mod: HCNC

## 2020-09-18 PROCEDURE — G0425 INPT/ED TELECONSULT30: HCPCS | Mod: HCNC,95,, | Performed by: PSYCHIATRY & NEUROLOGY

## 2020-09-18 PROCEDURE — 99284 PR EMERGENCY DEPT VISIT,LEVEL IV: ICD-10-PCS | Mod: HCNC,,, | Performed by: PSYCHIATRY & NEUROLOGY

## 2020-09-18 PROCEDURE — 93010 ELECTROCARDIOGRAM REPORT: CPT | Mod: HCNC,,, | Performed by: INTERNAL MEDICINE

## 2020-09-18 PROCEDURE — 99284 EMERGENCY DEPT VISIT MOD MDM: CPT | Mod: HCNC,,, | Performed by: PSYCHIATRY & NEUROLOGY

## 2020-09-18 PROCEDURE — 80053 COMPREHEN METABOLIC PANEL: CPT | Mod: HCNC

## 2020-09-18 PROCEDURE — 85025 COMPLETE CBC W/AUTO DIFF WBC: CPT | Mod: HCNC

## 2020-09-18 PROCEDURE — 93005 ELECTROCARDIOGRAM TRACING: CPT | Mod: HCNC

## 2020-09-18 PROCEDURE — 82962 GLUCOSE BLOOD TEST: CPT | Mod: HCNC

## 2020-09-18 PROCEDURE — 84443 ASSAY THYROID STIM HORMONE: CPT | Mod: HCNC

## 2020-09-18 PROCEDURE — 99285 EMERGENCY DEPT VISIT HI MDM: CPT | Mod: 25,HCNC

## 2020-09-18 PROCEDURE — 85610 PROTHROMBIN TIME: CPT | Mod: HCNC

## 2020-09-18 RX ORDER — BUTALBITAL, ACETAMINOPHEN AND CAFFEINE 50; 325; 40 MG/1; MG/1; MG/1
1 TABLET ORAL EVERY 4 HOURS PRN
Qty: 20 TABLET | Refills: 0 | Status: SHIPPED | OUTPATIENT
Start: 2020-09-18 | End: 2020-10-18

## 2020-09-18 RX ORDER — MECLIZINE HYDROCHLORIDE 25 MG/1
25 TABLET ORAL EVERY 6 HOURS PRN
Qty: 20 TABLET | Refills: 0 | Status: SHIPPED | OUTPATIENT
Start: 2020-09-18 | End: 2020-10-01 | Stop reason: SDUPTHER

## 2020-09-18 NOTE — SUBJECTIVE & OBJECTIVE
"  Woke up with symptoms?: yes    Recent bleeding noted: no  Does the patient take any Blood Thinners? no  Medications: Antiplatelets:  aspirin      Past Medical History: hypertension, diabetes, hyperlipidemia, MI/CAD, CHF and L Marcano's palsy, SUNITHA, COPD    Past Surgical History: no major surgeries within the last 2 weeks    Family History: no relevant history    Social History: former smoker    Allergies: No Known Allergies No known drug allergies    Review of Systems   Constitutional: Negative for chills, diaphoresis and fever.   HENT: Negative for hearing loss and trouble swallowing.    Eyes: Negative for visual disturbance.   Respiratory: Negative for shortness of breath.    Cardiovascular: Negative for chest pain and palpitations.   Gastrointestinal: Negative for blood in stool and vomiting.   Endocrine: Negative for cold intolerance.   Genitourinary: Negative for hematuria.   Musculoskeletal: Positive for gait problem. Negative for neck pain and neck stiffness.   Skin: Negative for rash.   Allergic/Immunologic: Negative for immunocompromised state.   Neurological: Positive for dizziness, facial asymmetry and headaches. Negative for speech difficulty, weakness and numbness.   Hematological: Does not bruise/bleed easily.   Psychiatric/Behavioral: Negative for agitation, behavioral problems and confusion.     Objective:   Vitals: Blood pressure (!) 144/72, pulse 62, temperature 97.6 °F (36.4 °C), temperature source Oral, resp. rate 18, height 5' 3" (1.6 m), weight 79.4 kg (175 lb), SpO2 96 %, not currently breastfeeding. BP: 144/71, Respiratory Rate: 16 and Heart Rate: 78    CT READ: Yes  No hemmorhage. No mass effect. No early infarct signs.     Physical Exam  Vitals signs and nursing note reviewed.   Constitutional:       General: She is not in acute distress.     Appearance: Normal appearance. She is not ill-appearing.   HENT:      Head: Normocephalic and atraumatic.      Nose: Nose normal.   Eyes:      " Extraocular Movements: Extraocular movements intact.   Neck:      Musculoskeletal: Normal range of motion.   Cardiovascular:      Rate and Rhythm: Normal rate and regular rhythm.   Pulmonary:      Effort: No respiratory distress.   Abdominal:      General: There is no distension.   Genitourinary:     Comments: No performed  Musculoskeletal: Normal range of motion.      Right lower leg: No edema.      Left lower leg: No edema.   Skin:     Findings: No erythema or rash.   Neurological:      Mental Status: She is alert and oriented to person, place, and time.      Cranial Nerves: No cranial nerve deficit.      Sensory: No sensory deficit.      Motor: No weakness.      Coordination: Coordination normal.   Psychiatric:         Mood and Affect: Mood normal.         Behavior: Behavior normal.

## 2020-09-18 NOTE — CONSULTS
"[facility]  Neurology  Consult Note    Patient Name: Ade Castellano  MRN: [id number]  Admission Date: 9/18/2020  Hospital Length of Stay: 0 days  Code Status: Prior   Attending Provider: Dorian Centeno MD   Consulting Provider: Fidel Chambers MD  Primary Care Physician: Jeannine Huitron MD  Principal Problem:<principal problem not specified>    Consults  Subjective:     Chief Complaint: Dizziness    HPI: 73 y/o with medical Hx as listed below presents to the ED for sudden onset of dizziness that began upon waking at 6:00 am this morning. Pt describes the dizziness as a room spinning sensation. She reports feeling off balance when walking. Dizziness is exacerbated upon standing and not so much by head movement. She has had vertigo before but this "is different" as it did not go away as other times before. No visual or speech disturbance, hearing loss, tinnitus, weakness or numbness of extremities. Ms Castellano denies fever, chills, ear pain, sore throat, eye problem, SOB, CP, abdominal pain, nausea, vomiting, diarrhea, dysuria.    Past Medical History:   Diagnosis Date    Abnormal stress test 8/5/2020    Acute respiratory failure with hypoxia and hypercapnia 11/29/2017    Angina pectoris     Arthritis     Bell's palsy     left facial weakness    Breast cancer     RIGHT    CAD (coronary artery disease)     Cervical cancer     Chronic bronchitis     Chronic heart failure with preserved ejection fraction 8/5/2020    Chronic heart failure with preserved ejection fraction 8/5/2020    COPD (chronic obstructive pulmonary disease)     Dr. Sterling brown present     Emphysema of lung     H/O colonoscopy 06/2017    due for repeat colonsocopy in 6/2018    History of Bell's palsy     History of heart artery stent     Dr. Ortiz  x2 stents    Hyperlipidemia     Hypertension     Lung cancer     Myocardial infarction     SUNITHA (obstructive sleep apnea)     intolerant to mask    SUNITHA " (obstructive sleep apnea)     intolerant to mask     Pneumonia     Pneumonia due to other staphylococcus     PUD (peptic ulcer disease)     Severe anemia 6/11/2018    Sleep apnea     Vaginal delivery     x1       Past Surgical History:   Procedure Laterality Date    ADENOIDECTOMY      BREAST BIOPSY Right     x3    BREAST LUMPECTOMY      BREAST SURGERY      lumpectomy right side     CERVIX SURGERY      cone    COLONOSCOPY N/A 3/17/2017    Procedure: COLONOSCOPY;  Surgeon: Julio Rudd MD;  Location: [facility] ENDO;  Service: Endoscopy;  Laterality: N/A;    COLONOSCOPY N/A 6/30/2017    Procedure: COLONOSCOPY;  Surgeon: Julio Rudd MD;  Location: [facility] ENDO;  Service: Endoscopy;  Laterality: N/A;    COLONOSCOPY N/A 11/28/2018    Procedure: COLONOSCOPY;  Surgeon: Nura Urbina MD;  Location: [facility] ENDO;  Service: Endoscopy;  Laterality: N/A;    COLONOSCOPY N/A 1/29/2019    Procedure: COLONOSCOPY;  Surgeon: Emmanuel Perez MD;  Location: [facility] ENDO;  Service: Endoscopy;  Laterality: N/A;  confirmed appt-SP    EYE SURGERY Bilateral 06/08/2018    cataract     LEFT HEART CATHETERIZATION Left 8/13/2020    Procedure: Left heart cath, rra, noon;  Surgeon: Guevara Skelton MD;  Location: [facility] CATH LAB;  Service: Cardiology;  Laterality: Left;  RN PREOP 8/7/2020---COVID NEGATIVE ON 8/12    PORTACATH PLACEMENT Right 01/2018    sweat glands axillary regions Bilateral     TONSILLECTOMY         Review of patient's allergies indicates:  No Known Allergies    Current Neurological Medications:     No current facility-administered medications on file prior to encounter.      Current Outpatient Medications on File Prior to Encounter   Medication Sig    albuterol (PROVENTIL) 2.5 mg /3 mL (0.083 %) nebulizer solution NEBULIZE 1 vial EVERY 6 HOURS AS NEEDED FOR WHEEZING    aspirin (ECOTRIN) 81 MG EC tablet Take 81 mg by mouth once daily.     fluticasone propionate (FLONASE) 50 mcg/actuation nasal spray USE TWO SPRAYS IN  EACH NOSTRIL ONCE A DAY    isosorbide mononitrate (IMDUR) 30 MG 24 hr tablet Take 1 tablet (30 mg total) by mouth once daily.    metoprolol tartrate (LOPRESSOR) 25 MG tablet TAKE 1 TABLET(S) BY MOUTH TWICE DAILY FOR BLOOD PRESSURE    omeprazole (PRILOSEC) 10 MG capsule Take 10 mg by mouth once daily.    PAZEO 0.7 % Drop instill 1 drop IN BOTH EYES daily    rosuvastatin (CRESTOR) 10 MG tablet Take 1 tablet (10 mg total) by mouth once daily.    TRELEGY ELLIPTA 100-62.5-25 mcg DsDv INHALE ONE PUFF INTO THE LUNGS ONCE A DAY    albuterol (VENTOLIN HFA) 90 mcg/actuation inhaler INHALE 2 PUFF(S) BY MOUTH EVERY 4 - 6 HOURS AS NEEDED FOR SHORTNESS OF BREATH or WHEEZING    diphenhydrAMINE-acetaminophen (TYLENOL PM EXTRA STRENGTH)  mg Tab Take 1 tablet by mouth nightly as needed.    nitroGLYCERIN (NITROSTAT) 0.4 MG SL tablet place 1 tablet under the tongue AS NEEDED. no more than 3 in 15 minutes      Family History     Problem Relation (Age of Onset)    Breast cancer Mother    COPD Sister    Cancer Mother, Father, Sister, Maternal Grandmother, Maternal Grandfather, Paternal Grandmother, Paternal Grandfather, Sister    Heart attack Sister    Heart disease Mother, Maternal Grandmother, Maternal Grandfather    Kidney cancer Son        Tobacco Use    Smoking status: Former Smoker     Packs/day: 0.25     Years: 50.00     Pack years: 12.50     Types: Cigarettes     Quit date: 2017     Years since quittin.8    Smokeless tobacco: Never Used   Substance and Sexual Activity    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   HENT: Negative for trouble swallowing.    Eyes: Negative for photophobia.   Respiratory: Negative for shortness of breath.    Cardiovascular: Negative for chest pain.   Gastrointestinal: Negative for abdominal pain.   Genitourinary: Negative for dysuria.   Musculoskeletal: Negative for  back pain.   Neurological: Positive for dizziness. Negative for headaches.   Psychiatric/Behavioral: Negative for confusion.     Objective:     Vital Signs (Most Recent):  Temp: 97.6 °F (36.4 °C) (09/18/20 0835)  Pulse: (!) 57 (09/18/20 1225)  Resp: (!) 22 (09/18/20 0947)  BP: 111/62 (09/18/20 1225)  SpO2: 95 % (09/18/20 1225) Vital Signs (24h Range):  Temp:  [97.6 °F (36.4 °C)] 97.6 °F (36.4 °C)  Pulse:  [52-62] 57  Resp:  [18-23] 22  SpO2:  [95 %-97 %] 95 %  BP: ()/(61-72) 111/62     Weight: 79.4 kg (175 lb)  Body mass index is 31 kg/m².    Physical Exam  Constitutional:       General: She is not in acute distress.     Appearance: She is not ill-appearing.   HENT:      Head: Normocephalic.      Right Ear: External ear normal.      Left Ear: External ear normal.      Mouth/Throat:      Mouth: Mucous membranes are dry.   Eyes:      General:         Right eye: No discharge.         Left eye: No discharge.   Neck:      Musculoskeletal: No neck rigidity.   Cardiovascular:      Rate and Rhythm: Normal rate and regular rhythm.   Pulmonary:      Breath sounds: Normal breath sounds.   Abdominal:      Palpations: Abdomen is soft.   Musculoskeletal:         General: No tenderness.   Neurological:      Mental Status: She is oriented to person, place, and time.      Coordination: Finger-Nose-Finger Test and Heel to Shin Test normal.      Deep Tendon Reflexes: Strength normal.   Psychiatric:         Speech: Speech normal.         NEUROLOGICAL EXAMINATION:     MENTAL STATUS   Oriented to person, place, and time.   Speech: speech is normal   Level of consciousness: alert    CRANIAL NERVES     CN III, IV, VI   Right pupil: Size: 3 mm. Shape: regular. Consensual response: intact.   Left pupil: Size: 3 mm. Shape: regular. Consensual response: intact.   Nystagmus: none   Ophthalmoparesis: none  Conjugate gaze: present    CN V   Right facial sensation deficit: none  Left facial sensation deficit: none    CN VII   Right facial  weakness: none  Left facial weakness: peripheral    CN XI   Right trapezius strength: normal    CN XII   Tongue deviation: none    MOTOR EXAM     Strength   Strength 5/5 throughout.     SENSORY EXAM   Right arm light touch: normal  Left arm light touch: normal  Right leg light touch: normal  Left leg light touch: normal    GAIT AND COORDINATION      Coordination   Finger to nose coordination: normal  Heel to shin coordination: normal      Significant Labs:   CBC:   Recent Labs   Lab 09/18/20  0925   WBC 4.68   HGB 13.7   HCT 42.1        CMP:   Recent Labs   Lab 09/18/20  0925   *      K 4.5      CO2 26   BUN 16   CREATININE 0.8   CALCIUM 9.5   PROT 7.2   ALBUMIN 4.1   BILITOT 0.5   ALKPHOS 51*   AST 24   ALT 17   ANIONGAP 9   EGFRNONAA >60       Significant Imaging: I have reviewed all pertinent imaging results/findings within the past 24 hours.    MRI brain  FINDINGS:  There is no midline shift, hydrocephalus or mass effect.  Mild generalized cerebral volume loss with prominence of the sulci, cisterns, and ventricles.  Brain parenchyma is overall normal in signal and contour.  Minimal chronic microvascular ischemic changes.  No diffusion restriction to suggest acute infarction.  No evidence of acute intracranial hemorrhage.  No abnormal extra-axial fluid collections.  Orbits, paranasal sinuses and mastoid air cells show no significant abnormality.  Structures in the sellar region and at the craniocervical junction show no significant abnormalities.     MRA brain: Distal internal carotid arteries are patent.  The major anterior and middle cerebral artery branches are patent.  There are bilateral posterior communicating arteries.     Distal vertebral, basilar and major posterior cerebral artery branches are patent.     Impression:     MRI of the brain without contrast shows no acute intracranial abnormalities.     MRA shows no evidence of high-grade stenosis, large vessel occlusion or  aneurysm.        Electronically signed by: Nahum Lindsay MD  Date:                                            09/18/2020  Time:                                           12:38    Assessment and Plan:     75 y/o female consulted vertigo    1. Vertigo: symptoms have resolved. Except for old left facial paralysis there are no abnormal focal findings on exam.   MRI with no stroke. No space-occupying lesions.   -Meclizine 25 mg every 8 hours PRN.   -OK to D/C home.    VTE Risk Mitigation (From admission, onward)    None          Thank you for your consult. I will follow-up with patient. Please contact us if you have any additional questions.    Fidel Chambers MD  Neurology  [facility]

## 2020-09-18 NOTE — DISCHARGE INSTRUCTIONS
Please return immediately if youGet worse or if new problems develop.  Meclizine for dizziness.  Fioricet for head pain.  Please follow-up with your primary care doctor this week.  Return immediately if you get worse or if new problems develop.

## 2020-09-18 NOTE — ED PROVIDER NOTES
Encounter Date: 9/18/2020    SCRIBE #1 NOTE: I, Terence Miner, am scribing for, and in the presence of,  Dorian Centeno MD. I have scribed the following portions of the note - Other sections scribed: HPI, ROS.       History     Chief Complaint   Patient presents with    Dizziness     started this am ,denies n/v    Headache     pt states she had a headache when waking up but gone now/    Neck Pain     right neck pain this am     CC: Dizziness    HPI: This is a 74 y.o. F who has Hx of Bell's Palsy, Hx of Breast Cancer (right), CAD, CHF, COPD, HLD, HTN, Hx of Lung cancer, SUNITHA, Hx of MI, Arthritis, and Anemia who presents to the ED for emergent evaluation of acute and constant dizziness with associated gait problem that began upon waking at 6:00 am this morning. Pt also reports associated headache upon waking that has spontaneously resolved. Pt describes the dizziness as a room spinning sensation. She reports feeling off balance when walking. Dizziness is exacerbated upon standing. Dizziness is not exacerbated with head movement. Pt has an additional complaint of acute left sided neck pain that began yesterday. Pt has a Hx of breast cancer, but she is unsure if the breast cancer ever metastasize to the brain. Pt takes an aspirin daily. She denies fever, chills, ear pain, sore throat, eye problem, SOB, CP, abdominal pain, nausea, vomiting, diarrhea, dysuria, arm problem, rash, or anticoagulant use. She does admit to cough since February.     The history is provided by the patient. No  was used.     Review of patient's allergies indicates:  No Known Allergies  Past Medical History:   Diagnosis Date    Abnormal stress test 8/5/2020    Acute respiratory failure with hypoxia and hypercapnia 11/29/2017    Angina pectoris     Arthritis     Bell's palsy     left facial weakness    Breast cancer     RIGHT    CAD (coronary artery disease)     Cervical cancer     Chronic bronchitis     Chronic  heart failure with preserved ejection fraction 8/5/2020    Chronic heart failure with preserved ejection fraction 8/5/2020    COPD (chronic obstructive pulmonary disease)     Dr. Katz    Dental bridge present     Emphysema of lung     H/O colonoscopy 06/2017    due for repeat colonsocopy in 6/2018    History of Bell's palsy     History of heart artery stent     Dr. Ortiz  x2 stents    Hyperlipidemia     Hypertension     Lung cancer     Myocardial infarction     SUNITHA (obstructive sleep apnea)     intolerant to mask    SUNITHA (obstructive sleep apnea)     intolerant to mask     Pneumonia     Pneumonia due to other staphylococcus     PUD (peptic ulcer disease)     Severe anemia 6/11/2018    Sleep apnea     Vaginal delivery     x1     Past Surgical History:   Procedure Laterality Date    ADENOIDECTOMY      BREAST BIOPSY Right     x3    BREAST LUMPECTOMY      BREAST SURGERY      lumpectomy right side     CERVIX SURGERY      cone    COLONOSCOPY N/A 3/17/2017    Procedure: COLONOSCOPY;  Surgeon: Julio Rudd MD;  Location: [facility] ENDO;  Service: Endoscopy;  Laterality: N/A;    COLONOSCOPY N/A 6/30/2017    Procedure: COLONOSCOPY;  Surgeon: Julio Rudd MD;  Location: [facility] ENDO;  Service: Endoscopy;  Laterality: N/A;    COLONOSCOPY N/A 11/28/2018    Procedure: COLONOSCOPY;  Surgeon: Nura Urbina MD;  Location: [facility] ENDO;  Service: Endoscopy;  Laterality: N/A;    COLONOSCOPY N/A 1/29/2019    Procedure: COLONOSCOPY;  Surgeon: Emmanuel Perez MD;  Location: [facility] ENDO;  Service: Endoscopy;  Laterality: N/A;  confirmed appt-SP    EYE SURGERY Bilateral 06/08/2018    cataract     LEFT HEART CATHETERIZATION Left 8/13/2020    Procedure: Left heart cath, rra, noon;  Surgeon: Guevara Skelton MD;  Location: [facility] CATH LAB;  Service: Cardiology;  Laterality: Left;  RN PREOP 8/7/2020---COVID NEGATIVE ON 8/12    PORTACATH PLACEMENT Right 01/2018    sweat glands axillary regions Bilateral     TONSILLECTOMY        Family History   Problem Relation Age of Onset    Cancer Mother         breast    Heart disease Mother     Breast cancer Mother     Cancer Father         lung-smoker     Cancer Sister         lung-smoker     Heart attack Sister     Cancer Maternal Grandmother     Heart disease Maternal Grandmother     Cancer Maternal Grandfather     Heart disease Maternal Grandfather     Cancer Paternal Grandmother     Cancer Paternal Grandfather     Cancer Sister         mets not sure where it started     COPD Sister     Kidney cancer Son      Social History     Tobacco Use    Smoking status: Former Smoker     Packs/day: 0.25     Years: 50.00     Pack years: 12.50     Types: Cigarettes     Quit date: 2017     Years since quittin.8    Smokeless tobacco: Never Used   Substance Use Topics    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   HENT: Negative for ear pain and sore throat.    Eyes: Negative for pain and visual disturbance.   Respiratory: Positive for cough. Negative for shortness of breath.    Cardiovascular: Negative for chest pain.   Gastrointestinal: Negative for abdominal pain, diarrhea, nausea and vomiting.   Genitourinary: Negative for dysuria.   Musculoskeletal: Positive for gait problem and neck pain (left sided). Negative for back pain and myalgias.   Skin: Negative for rash.   Neurological: Positive for dizziness. Negative for weakness and headaches.   Hematological: Does not bruise/bleed easily.       Physical Exam     Initial Vitals [20 0835]   BP Pulse Resp Temp SpO2   (!) 144/72 62 18 97.6 °F (36.4 °C) 96 %      MAP       --         Physical Exam  The patient was examined specifically for the following:   General:No significant distress, Good color, Warm and dry. Head and neck:Scalp atraumatic, Neck supple. Neurological:Appropriate conversation, Gross motor deficits. Eyes:Conjugate gaze,  Clear corneas. ENT: No epistaxis. Cardiac: Regular rate and rhythm, Grossly normal heart tones. Pulmonary: Wheezing, Rales. Gastrointestinal: Abdominal tenderness, Abdominal distention. Musculoskeletal: Extremity deformity, Apparent pain with range of motion of the joints. Skin: Rash.   The findings on examination were normal except for the following:  The patient has a left-sided facial paralysis.  She has facial asymmetry both above and below the eye.  She has normal speech.  There is no neglect.  There is no aphasia.  Cranial nerves motor and sensory examination are otherwise unremarkable.  The lungs are clear the heart tones are normal.  The abdomen is soft.  Extremities are nontender.  There is no pain with range of motion of any joints.  Finger-to-nose and heel to shin are normal.  The patient has an unsteady gait.  ED Course   Procedures  Labs Reviewed   CBC W/ AUTO DIFFERENTIAL - Abnormal; Notable for the following components:       Result Value    Mean Corpuscular Hemoglobin 31.1 (*)     Lymph # 0.8 (*)     Lymph% 17.3 (*)     All other components within normal limits   COMPREHENSIVE METABOLIC PANEL - Abnormal; Notable for the following components:    Glucose 121 (*)     Alkaline Phosphatase 51 (*)     All other components within normal limits   LIPID PANEL - Abnormal; Notable for the following components:    Triglycerides 191 (*)     LDL Cholesterol 49.8 (*)     All other components within normal limits   POCT GLUCOSE - Abnormal; Notable for the following components:    POCT Glucose 123 (*)     All other components within normal limits   PROTIME-INR   TSH   POCT GLUCOSE, HAND-HELD DEVICE     EKG Readings: (Independently Interpreted)   This patient is in a sinus bradycardia with a heart rate of 49.  There are no significant ST segment or T-wave changes.  The axis is normal.  Intervals are normal.  There is no evidence of acute myocardial infarction or malignant arrhythmia.     ECG Results          ECG 12  lead (In process)  Result time 09/18/20 12:26:28    In process by Interface, Lab In [facility] (09/18/20 12:26:28)                 Narrative:    Test Reason : I63.9,    Vent. Rate : 049 BPM     Atrial Rate : 049 BPM     P-R Int : 162 ms          QRS Dur : 084 ms      QT Int : 496 ms       P-R-T Axes : 074 064 065 degrees     QTc Int : 448 ms    Sinus bradycardia  Otherwise normal ECG  When compared with ECG of 24-JUL-2020 08:24,  No significant change was found    Referred By: [email or web address]   SELF           Confirmed By:                   In process by Interface, Lab In [facility] (09/18/20 12:23:29)                 Narrative:    Test Reason : I63.9,    Vent. Rate : 049 BPM     Atrial Rate : 049 BPM     P-R Int : 162 ms          QRS Dur : 084 ms      QT Int : 496 ms       P-R-T Axes : 074 064 065 degrees     QTc Int : 448 ms    Sinus bradycardia  Otherwise normal ECG  When compared with ECG of 24-JUL-2020 08:24,  No significant change was found    Referred By: [email or web address]   SELF           Confirmed By:                             Imaging Results          MRI Brain Without Contrast (Final result)  Result time 09/18/20 12:38:29    Final result by Nahum Lindsay MD (09/18/20 12:38:29)                 Impression:      MRI of the brain without contrast shows no acute intracranial abnormalities.    MRA shows no evidence of high-grade stenosis, large vessel occlusion or aneurysm.      Electronically signed by: Nahum Lindsay MD  Date:    09/18/2020  Time:    12:38             Narrative:    EXAMINATION:  MRI BRAIN WITHOUT CONTRAST; MRA BRAIN WITHOUT CONTRAST    CLINICAL HISTORY:  Dizziness, non-specific;    TECHNIQUE:  Multiplanar multisequence MR imaging of the brain was performed without contrast.    MRA of the brain obtained without contrast in a separate acquisition.    COMPARISON:  CT head the same day    FINDINGS:  There is no midline shift, hydrocephalus or mass effect.  Mild generalized cerebral volume loss with prominence of  the sulci, cisterns, and ventricles.  Brain parenchyma is overall normal in signal and contour.  Minimal chronic microvascular ischemic changes.  No diffusion restriction to suggest acute infarction.  No evidence of acute intracranial hemorrhage.  No abnormal extra-axial fluid collections.  Orbits, paranasal sinuses and mastoid air cells show no significant abnormality.  Structures in the sellar region and at the craniocervical junction show no significant abnormalities.    MRA brain: Distal internal carotid arteries are patent.  The major anterior and middle cerebral artery branches are patent.  There are bilateral posterior communicating arteries.    Distal vertebral, basilar and major posterior cerebral artery branches are patent.                               MRA Brain without contrast (Final result)  Result time 09/18/20 12:38:29    Final result by Nahum Lindsay MD (09/18/20 12:38:29)                 Impression:      MRI of the brain without contrast shows no acute intracranial abnormalities.    MRA shows no evidence of high-grade stenosis, large vessel occlusion or aneurysm.      Electronically signed by: Nahum Lindsay MD  Date:    09/18/2020  Time:    12:38             Narrative:    EXAMINATION:  MRI BRAIN WITHOUT CONTRAST; MRA BRAIN WITHOUT CONTRAST    CLINICAL HISTORY:  Dizziness, non-specific;    TECHNIQUE:  Multiplanar multisequence MR imaging of the brain was performed without contrast.    MRA of the brain obtained without contrast in a separate acquisition.    COMPARISON:  CT head the same day    FINDINGS:  There is no midline shift, hydrocephalus or mass effect.  Mild generalized cerebral volume loss with prominence of the sulci, cisterns, and ventricles.  Brain parenchyma is overall normal in signal and contour.  Minimal chronic microvascular ischemic changes.  No diffusion restriction to suggest acute infarction.  No evidence of acute intracranial hemorrhage.  No abnormal extra-axial fluid  collections.  Orbits, paranasal sinuses and mastoid air cells show no significant abnormality.  Structures in the sellar region and at the craniocervical junction show no significant abnormalities.    MRA brain: Distal internal carotid arteries are patent.  The major anterior and middle cerebral artery branches are patent.  There are bilateral posterior communicating arteries.    Distal vertebral, basilar and major posterior cerebral artery branches are patent.                               CT Head Without Contrast (Final result)  Result time 09/18/20 09:22:58    Final result by Nahum Lindsay MD (09/18/20 09:22:58)                 Impression:      No acute intracranial abnormality.      Electronically signed by: Nahum Lindsay MD  Date:    09/18/2020  Time:    09:22             Narrative:    EXAMINATION:  CT HEAD WITHOUT CONTRAST    CLINICAL HISTORY:  Neuro deficit, acute, stroke suspected;    TECHNIQUE:  Low dose axial images were obtained through the head.  Coronal and sagittal reformations were also performed. Contrast was not administered.    COMPARISON:  07/20/2019    FINDINGS:  Generalized cerebral volume loss with prominence of the sulci, cisterns, and ventricles.  No midline shift, hydrocephalus or mass effect.  No evidence of acute intracranial hemorrhage or acute major vascular territory infarct.  No abnormal extra-axial fluid collections.  There are extensive carotid calcifications.  Calvarium is intact.  Visualized paranasal sinuses and mastoid air cells are clear.                              Medical decision making:  This patient woke with dizziness which is a sensation of the room spinning around, accompanied by disequilibrium.  The sensation is constant.  Head movement is not required to cause the dizziness.  Vital signs are stable.  Finger-to-nose and heel to shin are normal.  Patient woke up with symptoms.  She has a slightly unsteady gait.  She had a headache earlier it is gone now.   MRI of the  brain fails to reveal significant abnormalities.  I doubt posterior circulation stroke.  This could be peripheral vertigo.  I will treat the patient with meclizine and discharge to outpatient evaluation and treatment.  I will add Fioricet for headache, should it recur.  I will have the patient follow up with primary care.   The patient was 5 followed by vascular neurology by stroke code.  I also consulted , who recommended discharge to outpatient evaluation.                                     Clinical Impression:     ICD-10-CM ICD-9-CM   1. Vertigo  R42 780.4   2. Dizziness  R42 780.4   3. Right-sided headache  R51 784.0                          ED Disposition Condition    Discharge Stable        ED Prescriptions     Medication Sig Dispense Start Date End Date Auth. Provider    meclizine (ANTIVERT) 25 mg tablet Take 1 tablet (25 mg total) by mouth every 6 (six) hours as needed for Dizziness. 20 tablet 9/18/2020  Dorian Centeno MD    butalbital-acetaminophen-caffeine -40 mg (FIORICET, ESGIC) -40 mg per tablet Take 1 tablet by mouth every 4 (four) hours as needed for Pain. 20 tablet 9/18/2020 10/18/2020 Dorian Centeno MD        Follow-up Information     Follow up With Specialties Details Why Contact Info    Jeannine Huitron MD Family Medicine In 3 days  [address] [facility]  [place] [address] [phone]                              I personally performed the services described in this documentation.  All medical record  entries made by the scribe are at my direction and in my presence.  Signed, Dr. Taryn Centeno MD  09/18/20 3119

## 2020-09-18 NOTE — ED NOTES
Called MRI, states they have someone on the table will be an additional 30-40 minuets before they send for the patient

## 2020-09-18 NOTE — ED TRIAGE NOTES
Patient reports started to feel dizzy this morning when she woke up, denies fall, or N/V, denies blurred vision. Patient with H/O Bell's Palsy left sided mouth droop noted, patient denies difference in droop. Denies numbness or issues with limbs.  States had neck and shoulder pain yesterday. Has H/O of Lung Ca, breast, lymph, and clavicle CA, finished radiation treatments in February, new spot found under right armpit 2 weeks ago. No current chemo or radiation  treatments at this time.

## 2020-09-18 NOTE — CONSULTS
[facility]  Vascular Neurology  Comprehensive Stroke Center  Tele-Consultation Note      Consults    Consulting Provider: BUCK RUSSO  Current Providers  No providers found    Patient Location:  [facility] EMERGENCY DEPARTMENT Emergency Department  Spoke hospital nurse at bedside with patient assisting consultant.     Patient information was obtained from patient.         Assessment/Plan:   75 y/o with HTN, HLD, CAD, MI, CHF, SUNITHA, L Bell's palsy, COPD, lung cancer, presents with acute onset vertigo and dysequilibrium upon awakening.  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  CTH without acute abnormality.  Differential diagnosis acute posterior circulation infarct vs peripheral vertigo. No a candidate for iv alteplase due to NIHSS 0, and LVO unlikely.  Recommend admission, MRI/MRA brain, and further neuro work up pending on tests results.  Continue ASA and add Plavix if MRI confirms acute infarct.            STROKE DOCUMENTATION     Acute Stroke Times:   Acute Stroke Times   Last Known Normal Date: 09/17/20  Last Known Normal Time: 2100  Symptom Onset Date: 09/18/20  Symptom Onset Time: 0600  Stroke Team Called Date: 09/18/20  Stroke Team Called Time: 0908  Stroke Team Arrival Date: 09/18/20  Stroke Team Arrival Time: 0914  CT Interpretation Time: 0914  Decision to Treat Time for Alteplase: (No alteplase)  Decision to Treat Time for IR: (No IR)    NIH Scale:  Interval: baseline  1a. Level of Consciousness: 0-->Alert, keenly responsive  1b. LOC Questions: 0-->Answers both questions correctly  1c. LOC Commands: 0-->Performs both tasks correctly  2. Best Gaze: 0-->Normal  3. Visual: 0-->No visual loss  4. Facial Palsy: 3-->Complete paralysis of one or both sides (absence of facial movement in the upper and lower face)  5a. Motor Arm, Left: 0-->No drift, limb holds 90 (or 45) degrees for full 10 secs  5b. Motor Arm, Right: 0-->No drift, limb holds 90 (or 45) degrees for full 10  "secs  6a. Motor Leg, Left: 0-->No drift, leg holds 30 degree position for full 5 secs  6b. Motor Leg, Right: 0-->No drift, leg holds 30 degree position for full 5 secs  7. Limb Ataxia: 0-->Absent  8. Sensory: 0-->Normal, no sensory loss  9. Best Language: 0-->No aphasia, normal  10. Dysarthria: 0-->Normal  11. Extinction and Inattention (formerly Neglect): 0-->No abnormality  Total (NIH Stroke Scale): 3     Modified Childress Score: 0  [place] Coma Scale:15   ABCD2 Score:    [id number] Score:   HAS -BLED Score:   ICH Score:   Hunt & Fraser Classification:       Diagnoses: vertigo. Dysequilibrium  No new Assessment & Plan notes have been filed under this hospital service since the last note was generated.  Service: Vascular Neurology      Blood pressure (!) 144/72, pulse 62, temperature 97.6 °F (36.4 °C), temperature source Oral, resp. rate 18, height 5' 3" (1.6 m), weight 79.4 kg (175 lb), SpO2 96 %, not currently breastfeeding.  Alteplase Eligible?: No  Alteplase Recommendation: Alteplase not recommended due to Suspected stroke mimic  and NIHSS 0  Possible Interventional Revascularization Candidate? No; No significant neurological deficit    Disposition Recommendation: admit to inpatient    Subjective:     History of Present Illness: 75 y/o with HTN, HLD, CAD, MI, CHF, SUNITHA, L Bell's palsy, COPD, lung cancer, presents with acute onset vertigo and dysequilibrium upon awakening. Had had similar symptoms before " but no with this intensity'. Vertigo in unrelated to head movements, and denies ear symptomatology.  No other associated neurological symptoms.        No notes on file      Woke up with symptoms?: yes    Recent bleeding noted: no  Does the patient take any Blood Thinners? no  Medications: Antiplatelets:  aspirin      Past Medical History: hypertension, diabetes, hyperlipidemia, MI/CAD, CHF and L Marcano's palsy, SUNITHA, COPD    Past Surgical History: no major surgeries within the last 2 weeks    Family History: no " "relevant history    Social History: former smoker    Allergies: No Known Allergies No known drug allergies    Review of Systems   Constitutional: Negative for chills, diaphoresis and fever.   HENT: Negative for hearing loss and trouble swallowing.    Eyes: Negative for visual disturbance.   Respiratory: Negative for shortness of breath.    Cardiovascular: Negative for chest pain and palpitations.   Gastrointestinal: Negative for blood in stool and vomiting.   Endocrine: Negative for cold intolerance.   Genitourinary: Negative for hematuria.   Musculoskeletal: Positive for gait problem. Negative for neck pain and neck stiffness.   Skin: Negative for rash.   Allergic/Immunologic: Negative for immunocompromised state.   Neurological: Positive for dizziness, facial asymmetry and headaches. Negative for speech difficulty, weakness and numbness.   Hematological: Does not bruise/bleed easily.   Psychiatric/Behavioral: Negative for agitation, behavioral problems and confusion.     Objective:   Vitals: Blood pressure (!) 144/72, pulse 62, temperature 97.6 °F (36.4 °C), temperature source Oral, resp. rate 18, height 5' 3" (1.6 m), weight 79.4 kg (175 lb), SpO2 96 %, not currently breastfeeding. BP: 144/71, Respiratory Rate: 16 and Heart Rate: 78    CT READ: Yes  No hemmorhage. No mass effect. No early infarct signs.     Physical Exam  Vitals signs and nursing note reviewed.   Constitutional:       General: She is not in acute distress.     Appearance: Normal appearance. She is not ill-appearing.   HENT:      Head: Normocephalic and atraumatic.      Nose: Nose normal.   Eyes:      Extraocular Movements: Extraocular movements intact.   Neck:      Musculoskeletal: Normal range of motion.   Cardiovascular:      Rate and Rhythm: Normal rate and regular rhythm.   Pulmonary:      Effort: No respiratory distress.   Abdominal:      General: There is no distension.   Genitourinary:     Comments: No performed  Musculoskeletal: Normal " range of motion.      Right lower leg: No edema.      Left lower leg: No edema.   Skin:     Findings: No erythema or rash.   Neurological:      Mental Status: She is alert and oriented to person, place, and time.      Cranial Nerves: No cranial nerve deficit.      Sensory: No sensory deficit.      Motor: No weakness.      Coordination: Coordination normal.   Psychiatric:         Mood and Affect: Mood normal.         Behavior: Behavior normal.               Recommended the emergency room physician to have a brief discussion with the patient and/or family if available regarding the risks and benefits of treatment, and to briefly document the occurrence of that discussion in his clinical encounter note.     The attending portion of this evaluation, treatment, and documentation was performed per Adan Putnam MD via audiovisual.    Billing code:  (non-intervention mild to moderate stroke, TIA, some mimics)    · This patient has a critical neurological condition/illness, with some potential for high morbidity and mortality.  · There is a moderate probability for acute neurological change leading to clinical and possibly life-threatening deterioration requiring highest level of physician preparedness for urgent intervention.  · Care was coordinated with other physicians involved in the patient's care.  · Radiologic studies and laboratory data were reviewed and interpreted, and plan of care was re-assessed based on the results.  · Diagnosis, treatment options and prognosis may have been discussed with the patient and/or family members or caregiver.      In your opinion, this was a: Tier 2 Van Negative    Consult End Time: 935 am    Adan Putnam MD  [facility]roke [facility]  Vascular Neurology   [facility]

## 2020-09-23 ENCOUNTER — TELEPHONE (OUTPATIENT)
Dept: FAMILY MEDICINE | Facility: CLINIC | Age: 75
End: 2020-09-23

## 2020-09-23 NOTE — TELEPHONE ENCOUNTER
----- Message from Michelle Maxwellmfield sent at 9/21/2020  1:31 PM CDT -----  Name of Who is Calling: CHOLO HARRIS      What is the request in detail: Pt would like to speak to staff in regards to being seen in the hospital for vertigo on Friday, states she was instructed to make a follow up for today, but she can't drive. Please advise.       Can the clinic reply by [facility]: No      What Number to Call Back if not in [facility]: [phone]

## 2020-10-01 ENCOUNTER — OFFICE VISIT (OUTPATIENT)
Dept: FAMILY MEDICINE | Facility: CLINIC | Age: 75
End: 2020-10-01
Payer: MEDICARE

## 2020-10-01 VITALS
HEIGHT: 63 IN | OXYGEN SATURATION: 95 % | HEART RATE: 58 BPM | TEMPERATURE: 98 F | BODY MASS INDEX: 31.21 KG/M2 | WEIGHT: 176.13 LBS | DIASTOLIC BLOOD PRESSURE: 68 MMHG | SYSTOLIC BLOOD PRESSURE: 100 MMHG

## 2020-10-01 DIAGNOSIS — N95.1 MENOPAUSAL STATE: ICD-10-CM

## 2020-10-01 DIAGNOSIS — Z78.0 ASYMPTOMATIC MENOPAUSAL STATE: ICD-10-CM

## 2020-10-01 DIAGNOSIS — R42 VERTIGO: Primary | ICD-10-CM

## 2020-10-01 DIAGNOSIS — R05.3 CHRONIC COUGH: ICD-10-CM

## 2020-10-01 PROCEDURE — G0008 ADMIN INFLUENZA VIRUS VAC: HCPCS | Mod: HCNC,S$GLB,, | Performed by: FAMILY MEDICINE

## 2020-10-01 PROCEDURE — 1159F PR MEDICATION LIST DOCUMENTED IN MEDICAL RECORD: ICD-10-PCS | Mod: HCNC,S$GLB,, | Performed by: FAMILY MEDICINE

## 2020-10-01 PROCEDURE — 3008F BODY MASS INDEX DOCD: CPT | Mod: HCNC,CPTII,S$GLB, | Performed by: FAMILY MEDICINE

## 2020-10-01 PROCEDURE — 90694 VACC AIIV4 NO PRSRV 0.5ML IM: CPT | Mod: HCNC,S$GLB,, | Performed by: FAMILY MEDICINE

## 2020-10-01 PROCEDURE — 3078F PR MOST RECENT DIASTOLIC BLOOD PRESSURE < 80 MM HG: ICD-10-PCS | Mod: HCNC,CPTII,S$GLB, | Performed by: FAMILY MEDICINE

## 2020-10-01 PROCEDURE — 3074F PR MOST RECENT SYSTOLIC BLOOD PRESSURE < 130 MM HG: ICD-10-PCS | Mod: HCNC,CPTII,S$GLB, | Performed by: FAMILY MEDICINE

## 2020-10-01 PROCEDURE — 3008F PR BODY MASS INDEX (BMI) DOCUMENTED: ICD-10-PCS | Mod: HCNC,CPTII,S$GLB, | Performed by: FAMILY MEDICINE

## 2020-10-01 PROCEDURE — 3074F SYST BP LT 130 MM HG: CPT | Mod: HCNC,CPTII,S$GLB, | Performed by: FAMILY MEDICINE

## 2020-10-01 PROCEDURE — 99214 OFFICE O/P EST MOD 30 MIN: CPT | Mod: 25,HCNC,S$GLB, | Performed by: FAMILY MEDICINE

## 2020-10-01 PROCEDURE — 1101F PT FALLS ASSESS-DOCD LE1/YR: CPT | Mod: HCNC,CPTII,S$GLB, | Performed by: FAMILY MEDICINE

## 2020-10-01 PROCEDURE — 1101F PR PT FALLS ASSESS DOC 0-1 FALLS W/OUT INJ PAST YR: ICD-10-PCS | Mod: HCNC,CPTII,S$GLB, | Performed by: FAMILY MEDICINE

## 2020-10-01 PROCEDURE — 99999 PR PBB SHADOW E&M-EST. PATIENT-LVL IV: ICD-10-PCS | Mod: PBBFAC,HCNC,, | Performed by: FAMILY MEDICINE

## 2020-10-01 PROCEDURE — 99214 PR OFFICE/OUTPT VISIT, EST, LEVL IV, 30-39 MIN: ICD-10-PCS | Mod: 25,HCNC,S$GLB, | Performed by: FAMILY MEDICINE

## 2020-10-01 PROCEDURE — G0008 FLU VACCINE - QUADRIVALENT - ADJUVANTED: ICD-10-PCS | Mod: HCNC,S$GLB,, | Performed by: FAMILY MEDICINE

## 2020-10-01 PROCEDURE — 99999 PR PBB SHADOW E&M-EST. PATIENT-LVL IV: CPT | Mod: PBBFAC,HCNC,, | Performed by: FAMILY MEDICINE

## 2020-10-01 PROCEDURE — 1159F MED LIST DOCD IN RCRD: CPT | Mod: HCNC,S$GLB,, | Performed by: FAMILY MEDICINE

## 2020-10-01 PROCEDURE — 90694 FLU VACCINE - QUADRIVALENT - ADJUVANTED: ICD-10-PCS | Mod: HCNC,S$GLB,, | Performed by: FAMILY MEDICINE

## 2020-10-01 PROCEDURE — 3078F DIAST BP <80 MM HG: CPT | Mod: HCNC,CPTII,S$GLB, | Performed by: FAMILY MEDICINE

## 2020-10-01 RX ORDER — MECLIZINE HYDROCHLORIDE 25 MG/1
25 TABLET ORAL EVERY 6 HOURS PRN
Qty: 30 TABLET | Refills: 2 | Status: SHIPPED | OUTPATIENT
Start: 2020-10-01 | End: 2020-11-05

## 2020-10-01 RX ORDER — MONTELUKAST SODIUM 10 MG/1
10 TABLET ORAL NIGHTLY
Qty: 30 TABLET | Refills: 0 | Status: SHIPPED | OUTPATIENT
Start: 2020-10-01 | End: 2020-10-26

## 2020-10-01 NOTE — PROGRESS NOTES
Subjective:       Patient ID: Ade Castellano is a 74 y.o. female.    Chief Complaint: ER Follow Up/ Vertigo    74 year ol.d female presents for a chronic cough. She has a history of breast and lung cancer. She had radiation and now has a chronic cough. She is coughing up thick, white mucus. She has no fever or chills.    She was seen in the ED for dizziness. She had a CT of her brain, MRI, and MRA. All of these were normal. Her symptoms are resolved, but she would like a refill of her meclizine. She was on flonase and clarinex, but stopped the clarinex.       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     She is still active, cutting her grass.       MRI/MRA brain:  MRI of the brain without contrast shows no acute intracranial abnormalities.     MRA shows no evidence of high-grade stenosis, large vessel occlusion or aneurysm.         Past Medical History:  8/5/2020: Abnormal stress test  11/29/2017: Acute respiratory failure with hypoxia and hypercapnia  No date: Angina pectoris  No date: Arthritis  No date: Bell's palsy      Comment:  left facial weakness  No date: Breast cancer      Comment:  RIGHT  No date: CAD (coronary artery disease)  No date: Cervical cancer  No date: Chronic bronchitis  8/5/2020: Chronic heart failure with preserved ejection fraction  8/5/2020: Chronic heart failure with preserved ejection fraction  No date: COPD (chronic obstructive pulmonary disease)      Comment:  Dr. Katz  No date: Dental bridge present  No date: Emphysema of lung  06/2017: H/O colonoscopy      Comment:  due for repeat colonsocopy in 6/2018  No date: History of Bell's palsy  No date: History of heart artery stent      Comment:  Dr. Ortiz  x2 stents  No date: Hyperlipidemia  No date: Hypertension  No date: Lung cancer  No date: Myocardial infarction  No date: SUNITHA (obstructive sleep apnea)      Comment:  intolerant to  mask  No date: SUNITHA (obstructive sleep apnea)      Comment:  intolerant to mask   No date: Pneumonia  No date: Pneumonia due to other staphylococcus  No date: PUD (peptic ulcer disease)  6/11/2018: Severe anemia  No date: Sleep apnea  No date: Vaginal delivery      Comment:  x1   Past Surgical History:  No date: ADENOIDECTOMY  No date: BREAST BIOPSY; Right      Comment:  x3  No date: BREAST LUMPECTOMY  No date: BREAST SURGERY      Comment:  lumpectomy right side   No date: CERVIX SURGERY      Comment:  cone  3/17/2017: COLONOSCOPY; N/A      Comment:  Procedure: COLONOSCOPY;  Surgeon: Julio Rudd MD;                 Location: [facility] ENDO;  Service: Endoscopy;  Laterality:                N/A;  6/30/2017: COLONOSCOPY; N/A      Comment:  Procedure: COLONOSCOPY;  Surgeon: Julio Rudd MD;                 Location: [facility] ENDO;  Service: Endoscopy;  Laterality:                N/A;  11/28/2018: COLONOSCOPY; N/A      Comment:  Procedure: COLONOSCOPY;  Surgeon: Nura Urbina MD;                 Location: [facility] ENDO;  Service: Endoscopy;  Laterality:                N/A;  1/29/2019: COLONOSCOPY; N/A      Comment:  Procedure: COLONOSCOPY;  Surgeon: Emmanuel Perez MD;                Location: [facility] ENDO;  Service: Endoscopy;  Laterality:                N/A;  confirmed appt-SP  06/08/2018: EYE SURGERY; Bilateral      Comment:  cataract   8/13/2020: LEFT HEART CATHETERIZATION; Left      Comment:  Procedure: Left heart cath, rra, noon;  Surgeon: Guevara Skelton MD;  Location: [facility] CATH LAB;  Service:                Cardiology;  Laterality: Left;  RN PREOP 8/7/2020---COVID               NEGATIVE ON 8/12 01/2018: PORTACATH PLACEMENT; Right  No date: sweat glands axillary regions; Bilateral  No date: TONSILLECTOMY  Review of patient's family history indicates:  Problem: Cancer      Relation: Mother          Age of Onset: (Not Specified)          Comment: breast  Problem: Heart disease      Relation: Mother          Age  of Onset: (Not Specified)  Problem: Breast cancer      Relation: Mother          Age of Onset: (Not Specified)  Problem: Cancer      Relation: Father          Age of Onset: (Not Specified)          Comment: lung-smoker   Problem: Cancer      Relation: Sister          Age of Onset: (Not Specified)          Comment: lung-smoker   Problem: Heart attack      Relation: Sister          Age of Onset: (Not Specified)  Problem: Cancer      Relation: Maternal Grandmother          Age of Onset: (Not Specified)  Problem: Heart disease      Relation: Maternal Grandmother          Age of Onset: (Not Specified)  Problem: Cancer      Relation: Maternal Grandfather          Age of Onset: (Not Specified)  Problem: Heart disease      Relation: Maternal Grandfather          Age of Onset: (Not Specified)  Problem: Cancer      Relation: Paternal Grandmother          Age of Onset: (Not Specified)  Problem: Cancer      Relation: Paternal Grandfather          Age of Onset: (Not Specified)  Problem: Cancer      Relation: Sister          Age of Onset: (Not Specified)          Comment: mets not sure where it started   Problem: COPD      Relation: Sister          Age of Onset: (Not Specified)  Problem: Kidney cancer      Relation: Son          Age of Onset: (Not Specified)    Social History    Socioeconomic History      Marital status: Single      Spouse name: Not on file      Number of children: Not on file      Years of education: Not on file      Highest education level: Not on file    Occupational History      Not on file    Social Needs      Financial resource strain: Not on file      Food insecurity        Worry: Not on file        Inability: Not on file      Transportation needs        Medical: Not on file        Non-medical: Not on file    Tobacco Use      Smoking status: Former Smoker        Packs/day: 0.25        Years: 50.00        Pack years: 12.5        Types: Cigarettes        Quit date: 11/9/2017        Years since quitting:  "2.8      Smokeless tobacco: Never Used    Substance and Sexual Activity      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       Drug use: No      Sexual activity: Never    Lifestyle      Physical activity        Days per week: Not on file        Minutes per session: Not on file      Stress: Not at all    Relationships      Social connections        Talks on phone: Not on file        Gets together: Not on file        Attends Taoist service: Not on file        Active member of club or organization: Not on file        Attends meetings of clubs or organizations: Not on file        Relationship status: Not on file    Other Topics      Concerns:        Not on file    Social History Narrative      Not on file        Review of Systems      Objective:       Vitals:    10/01/20 0910   BP: 100/68   Pulse: (!) 58   Temp: 98.1 °F (36.7 °C)   TempSrc: Oral   SpO2: 95%   Weight: 79.9 kg (176 lb 2.4 oz)   Height: 5' 3" (1.6 m)       Physical Exam  Constitutional:       General: She is not in acute distress.     Appearance: Normal appearance. She is well-developed. She is not ill-appearing or diaphoretic.   HENT:      Head: Normocephalic and atraumatic.      Right Ear: External ear normal. A middle ear effusion is present.      Left Ear: External ear normal. A middle ear effusion is present.      Mouth/Throat:      Pharynx: No oropharyngeal exudate.   Neck:      Musculoskeletal: Normal range of motion and neck supple.   Cardiovascular:      Rate and Rhythm: Normal rate and regular rhythm.      Heart sounds: Normal heart sounds. No murmur. No friction rub. No gallop.    Pulmonary:      Effort: Pulmonary effort is normal. No respiratory distress.      Breath sounds: Normal breath sounds. No stridor. No wheezing, rhonchi or rales.   Chest:      Chest wall: No tenderness.   Lymphadenopathy:      Cervical: No cervical adenopathy.   Neurological:      Mental Status: She is alert and oriented to person, place, and time.       "   Assessment:       1. Vertigo    2. Menopausal state    3. Asymptomatic menopausal state     4. Chronic cough        Plan:       Ade was seen today for er follow up/ vertigo.    Diagnoses and all orders for this visit:    Vertigo  -     meclizine (ANTIVERT) 25 mg tablet; Take 1 tablet (25 mg total) by mouth every 6 (six) hours as needed for Dizziness.  Stable. Refilled meds.     Menopausal state  -     DXA Bone Density Spine And Hip; Future    Asymptomatic menopausal state   -     DXA Bone Density Spine And Hip; Future    Chronic cough  -     montelukast (SINGULAIR) 10 mg tablet; Take 1 tablet (10 mg total) by mouth every evening.  Trial of singulair to minimize mucus production.   Other orders  -     Influenza (FLUAD) - Quadrivalent (Adjuvanted) *Preferred* (65+) (PF)

## 2020-10-12 ENCOUNTER — TELEPHONE (OUTPATIENT)
Dept: HEMATOLOGY/ONCOLOGY | Facility: CLINIC | Age: 75
End: 2020-10-12

## 2020-10-12 ENCOUNTER — LAB VISIT (OUTPATIENT)
Dept: LAB | Facility: HOSPITAL | Age: 75
End: 2020-10-12
Attending: INTERNAL MEDICINE
Payer: MEDICARE

## 2020-10-12 ENCOUNTER — INFUSION (OUTPATIENT)
Dept: INFUSION THERAPY | Facility: HOSPITAL | Age: 75
End: 2020-10-12
Attending: INTERNAL MEDICINE
Payer: MEDICARE

## 2020-10-12 ENCOUNTER — OFFICE VISIT (OUTPATIENT)
Dept: HEMATOLOGY/ONCOLOGY | Facility: CLINIC | Age: 75
End: 2020-10-12
Payer: MEDICARE

## 2020-10-12 VITALS
RESPIRATION RATE: 17 BRPM | HEART RATE: 54 BPM | SYSTOLIC BLOOD PRESSURE: 178 MMHG | DIASTOLIC BLOOD PRESSURE: 81 MMHG | TEMPERATURE: 98 F | OXYGEN SATURATION: 98 %

## 2020-10-12 VITALS
HEIGHT: 63 IN | HEART RATE: 57 BPM | DIASTOLIC BLOOD PRESSURE: 88 MMHG | OXYGEN SATURATION: 96 % | WEIGHT: 177.5 LBS | BODY MASS INDEX: 31.45 KG/M2 | TEMPERATURE: 98 F | SYSTOLIC BLOOD PRESSURE: 110 MMHG

## 2020-10-12 DIAGNOSIS — C50.919 RECURRENT BREAST CANCER, UNSPECIFIED LATERALITY: Primary | ICD-10-CM

## 2020-10-12 DIAGNOSIS — R94.8 ABNORMAL POSITRON EMISSION TOMOGRAPHY (PET) SCAN: ICD-10-CM

## 2020-10-12 DIAGNOSIS — C50.111 MALIGNANT NEOPLASM OF CENTRAL PORTION OF RIGHT BREAST IN FEMALE, ESTROGEN RECEPTOR NEGATIVE: ICD-10-CM

## 2020-10-12 DIAGNOSIS — C50.919 RECURRENT BREAST CANCER, UNSPECIFIED LATERALITY: ICD-10-CM

## 2020-10-12 DIAGNOSIS — N63.10 LUMP OF BREAST, RIGHT: ICD-10-CM

## 2020-10-12 DIAGNOSIS — C34.91 SQUAMOUS CELL CARCINOMA OF RIGHT LUNG: ICD-10-CM

## 2020-10-12 DIAGNOSIS — C34.90 SQUAMOUS CELL CARCINOMA LUNG: Primary | ICD-10-CM

## 2020-10-12 DIAGNOSIS — J44.9 CHRONIC OBSTRUCTIVE PULMONARY DISEASE, UNSPECIFIED COPD TYPE: ICD-10-CM

## 2020-10-12 DIAGNOSIS — Z17.1 MALIGNANT NEOPLASM OF CENTRAL PORTION OF RIGHT BREAST IN FEMALE, ESTROGEN RECEPTOR NEGATIVE: ICD-10-CM

## 2020-10-12 LAB
ALBUMIN SERPL BCP-MCNC: 4.2 G/DL (ref 3.5–5.2)
ALP SERPL-CCNC: 56 U/L (ref 55–135)
ALT SERPL W/O P-5'-P-CCNC: 22 U/L (ref 10–44)
ANION GAP SERPL CALC-SCNC: 10 MMOL/L (ref 8–16)
AST SERPL-CCNC: 20 U/L (ref 10–40)
BASOPHILS # BLD AUTO: 0.07 K/UL (ref 0–0.2)
BASOPHILS NFR BLD: 1.6 % (ref 0–1.9)
BILIRUB SERPL-MCNC: 0.3 MG/DL (ref 0.1–1)
BUN SERPL-MCNC: 16 MG/DL (ref 8–23)
CALCIUM SERPL-MCNC: 9.4 MG/DL (ref 8.7–10.5)
CHLORIDE SERPL-SCNC: 102 MMOL/L (ref 95–110)
CO2 SERPL-SCNC: 28 MMOL/L (ref 23–29)
CREAT SERPL-MCNC: 0.8 MG/DL (ref 0.5–1.4)
DIFFERENTIAL METHOD: ABNORMAL
EOSINOPHIL # BLD AUTO: 0.1 K/UL (ref 0–0.5)
EOSINOPHIL NFR BLD: 2.3 % (ref 0–8)
ERYTHROCYTE [DISTWIDTH] IN BLOOD BY AUTOMATED COUNT: 13.8 % (ref 11.5–14.5)
EST. GFR  (AFRICAN AMERICAN): >60 ML/MIN/1.73 M^2
EST. GFR  (NON AFRICAN AMERICAN): >60 ML/MIN/1.73 M^2
GLUCOSE SERPL-MCNC: 138 MG/DL (ref 70–110)
HCT VFR BLD AUTO: 42.1 % (ref 37–48.5)
HGB BLD-MCNC: 13.7 G/DL (ref 12–16)
IMM GRANULOCYTES # BLD AUTO: 0.02 K/UL (ref 0–0.04)
IMM GRANULOCYTES NFR BLD AUTO: 0.5 % (ref 0–0.5)
LYMPHOCYTES # BLD AUTO: 1.1 K/UL (ref 1–4.8)
LYMPHOCYTES NFR BLD: 25.4 % (ref 18–48)
MCH RBC QN AUTO: 31.1 PG (ref 27–31)
MCHC RBC AUTO-ENTMCNC: 32.5 G/DL (ref 32–36)
MCV RBC AUTO: 96 FL (ref 82–98)
MONOCYTES # BLD AUTO: 0.5 K/UL (ref 0.3–1)
MONOCYTES NFR BLD: 11.7 % (ref 4–15)
NEUTROPHILS # BLD AUTO: 2.5 K/UL (ref 1.8–7.7)
NEUTROPHILS NFR BLD: 58.5 % (ref 38–73)
NRBC BLD-RTO: 0 /100 WBC
PLATELET # BLD AUTO: 185 K/UL (ref 150–350)
PMV BLD AUTO: 9.4 FL (ref 9.2–12.9)
POTASSIUM SERPL-SCNC: 4.4 MMOL/L (ref 3.5–5.1)
PROT SERPL-MCNC: 6.9 G/DL (ref 6–8.4)
RBC # BLD AUTO: 4.4 M/UL (ref 4–5.4)
SODIUM SERPL-SCNC: 140 MMOL/L (ref 136–145)
WBC # BLD AUTO: 4.29 K/UL (ref 3.9–12.7)

## 2020-10-12 PROCEDURE — 1101F PT FALLS ASSESS-DOCD LE1/YR: CPT | Mod: CPTII,S$GLB,, | Performed by: INTERNAL MEDICINE

## 2020-10-12 PROCEDURE — A4216 STERILE WATER/SALINE, 10 ML: HCPCS | Mod: HCNC | Performed by: INTERNAL MEDICINE

## 2020-10-12 PROCEDURE — 1159F MED LIST DOCD IN RCRD: CPT | Mod: S$GLB,,, | Performed by: INTERNAL MEDICINE

## 2020-10-12 PROCEDURE — 25000003 PHARM REV CODE 250: Mod: HCNC | Performed by: INTERNAL MEDICINE

## 2020-10-12 PROCEDURE — 85025 COMPLETE CBC W/AUTO DIFF WBC: CPT

## 2020-10-12 PROCEDURE — 1125F PR PAIN SEVERITY QUANTIFIED, PAIN PRESENT: ICD-10-PCS | Mod: S$GLB,,, | Performed by: INTERNAL MEDICINE

## 2020-10-12 PROCEDURE — 36415 COLL VENOUS BLD VENIPUNCTURE: CPT

## 2020-10-12 PROCEDURE — 63600175 PHARM REV CODE 636 W HCPCS: Mod: HCNC | Performed by: INTERNAL MEDICINE

## 2020-10-12 PROCEDURE — 99215 OFFICE O/P EST HI 40 MIN: CPT | Mod: S$GLB,,, | Performed by: INTERNAL MEDICINE

## 2020-10-12 PROCEDURE — 3008F BODY MASS INDEX DOCD: CPT | Mod: CPTII,S$GLB,, | Performed by: INTERNAL MEDICINE

## 2020-10-12 PROCEDURE — 1125F AMNT PAIN NOTED PAIN PRSNT: CPT | Mod: S$GLB,,, | Performed by: INTERNAL MEDICINE

## 2020-10-12 PROCEDURE — 3074F SYST BP LT 130 MM HG: CPT | Mod: CPTII,S$GLB,, | Performed by: INTERNAL MEDICINE

## 2020-10-12 PROCEDURE — 99999 PR PBB SHADOW E&M-EST. PATIENT-LVL V: ICD-10-PCS | Mod: PBBFAC,HCNC,, | Performed by: INTERNAL MEDICINE

## 2020-10-12 PROCEDURE — 99499 RISK ADDL DX/OHS AUDIT: ICD-10-PCS | Mod: S$GLB,,, | Performed by: INTERNAL MEDICINE

## 2020-10-12 PROCEDURE — 80053 COMPREHEN METABOLIC PANEL: CPT

## 2020-10-12 PROCEDURE — 3079F DIAST BP 80-89 MM HG: CPT | Mod: CPTII,S$GLB,, | Performed by: INTERNAL MEDICINE

## 2020-10-12 PROCEDURE — 96523 IRRIG DRUG DELIVERY DEVICE: CPT | Mod: HCNC

## 2020-10-12 PROCEDURE — 3008F PR BODY MASS INDEX (BMI) DOCUMENTED: ICD-10-PCS | Mod: CPTII,S$GLB,, | Performed by: INTERNAL MEDICINE

## 2020-10-12 PROCEDURE — 99215 PR OFFICE/OUTPT VISIT, EST, LEVL V, 40-54 MIN: ICD-10-PCS | Mod: S$GLB,,, | Performed by: INTERNAL MEDICINE

## 2020-10-12 PROCEDURE — 99999 PR PBB SHADOW E&M-EST. PATIENT-LVL V: CPT | Mod: PBBFAC,HCNC,, | Performed by: INTERNAL MEDICINE

## 2020-10-12 PROCEDURE — 1159F PR MEDICATION LIST DOCUMENTED IN MEDICAL RECORD: ICD-10-PCS | Mod: S$GLB,,, | Performed by: INTERNAL MEDICINE

## 2020-10-12 PROCEDURE — 1101F PR PT FALLS ASSESS DOC 0-1 FALLS W/OUT INJ PAST YR: ICD-10-PCS | Mod: CPTII,S$GLB,, | Performed by: INTERNAL MEDICINE

## 2020-10-12 PROCEDURE — 99499 UNLISTED E&M SERVICE: CPT | Mod: S$GLB,,, | Performed by: INTERNAL MEDICINE

## 2020-10-12 PROCEDURE — 3079F PR MOST RECENT DIASTOLIC BLOOD PRESSURE 80-89 MM HG: ICD-10-PCS | Mod: CPTII,S$GLB,, | Performed by: INTERNAL MEDICINE

## 2020-10-12 PROCEDURE — 3074F PR MOST RECENT SYSTOLIC BLOOD PRESSURE < 130 MM HG: ICD-10-PCS | Mod: CPTII,S$GLB,, | Performed by: INTERNAL MEDICINE

## 2020-10-12 RX ORDER — SODIUM CHLORIDE 0.9 % (FLUSH) 0.9 %
10 SYRINGE (ML) INJECTION
Status: DISCONTINUED | OUTPATIENT
Start: 2020-10-12 | End: 2020-10-12 | Stop reason: HOSPADM

## 2020-10-12 RX ORDER — HEPARIN 100 UNIT/ML
500 SYRINGE INTRAVENOUS
Status: DISCONTINUED | OUTPATIENT
Start: 2020-10-12 | End: 2020-10-12 | Stop reason: HOSPADM

## 2020-10-12 RX ADMIN — Medication 10 ML: at 09:10

## 2020-10-12 RX ADMIN — HEPARIN 500 UNITS: 100 SYRINGE at 09:10

## 2020-10-12 NOTE — PROGRESS NOTES
Subjective:       Patient ID: Ade Castellano is a 75 y.o. female.    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   Pathology showed a 1.15 cm, grade 3 infiltrating ductal carcinoma.  One sentinel lymph node was negative for malignancy.  Margins were negative and the closest margin was 1 cm.  She was staged a little pT1c little pN0 cM0 stage IA.  She declined adjuvant chemo therapy.  She completed post operative radiation therapy at 400 cGy per fraction to 4000 cGy 9/2014         Pt hospitalized 11/2017   Ms. Castellano was admitted for acute on chronic respiratory failure requiring intubation and ventilation. Has large lung mass in right lung with probable post obstructive pneumonia resulting in COPD exacerbation. But also, patient had ran out of home O2 prior to onset of symptoms, likely contributing to presentation. Initiated on broad spectrum abx, IV steroids, duo-nebs and pulmonology consulted for ventilator co-management. Successfully extubated to BiPAP on 11/30. Then de-escalated to low flow nasal cannula. Abx tailored to augmentin for broad coverage, including anaerobic bacteria /     CTA chest 11/29/2017 revealed   Large right hilar mass with involvement of adjacent segmental pulmonary arterial branches and bronchi with associated postobstructive atelectasis and volume loss in the right middle lobe with adjacent ground glass opacity.  Findings highly concerning for underlying neoplastic process. Mediastinal and axillary adenopathy, with a right axillary lymph node measuring up to 2.0 cm in  short axis diameter. No definite evidence of pulmonary thromboembolism.    She is followed by Pulmonology   She underwent rt axillary LN bx at outside facility- on 1/16/2018 at [facility]  Pathology revealed metastatic poorly differentiated carcinoma of unknown primary site  TTF-1 negative, napsin negative  , cytokeratin 7 positive, cytokeratin 20 negative, p63 negative, cytokeratin 5/6 focal dim positivity    She next underwent lung bx at [facility] 1/31/2018   Pathology revealed benign lung tissue    She underwent Repeat Right Lung Bx 2/14/2018 Pathology reveals Squamous cell carcinoma  PD-L1 10% low expression  EGFR NEG  ALK NEG  BRAF NEG      Outside slides axillary LN specimen  requested for review/comparison to lung bx findings     1) Right Lung Bx 2/14/2018  Supplemental Diagnosis  Immunohistochemical stains show strong nuclear staining for p63 in essentially all tumor cells and very strong  cytoplasmic and membrane staining for CK5/6, also in essentially all tumor cells. TTF-1 and CK7 are negative  within tumor cells but do stain native pulmonary elements present within the biopsy. A stain for mucicarmine is  negative. Positive and negative controls function appropriately.  Final diagnosis: Specimen submitted as right lung biopsy  -Squamous cell carcinoma  (Electronically Signed: 2018-02-20 09:12:07 )  Diagnosed by: Phong Thompson  FINAL PATHOLOGIC DIAGNOSIS  Fragments of pulmonary parenchyma (submitted as right lung biopsy):    FINAL PATHOLOGIC DIAGNOSIS 1. Lymph node, right axilla, biopsy, review of 10 outside slides [facility], JS 18 1 110,  collected January 11, 2018: Metastatic poorly differentiated carcinoma (see comment).  The histologic section shows fibrous stroma infiltrated by nest of poorly differentiated malignant cells including  occasional dyskeratotic cells morphologically suggestive of metastatic squamous cell carcinoma.  Immunohistochemical stains are nonspecific;  the cells are positive for cytokeratin 7 and cytokeratin 5/6 (focal) and  negative for cytokeratin 20, TTF-1, p63, GCDFP, mammoglobin, and CEA      PET/CT  4/19/2018 revealed there has been a excellent response to therapy.  At least 90% reduction in malignant activity.    She s/p  cycle 6 of carboplatin/Abraxane completed 7/2018        PET/CT 2/21/2019 increased size and irregularity of the right axillary lymph node with increased FDG avidity     MAMMO/US rt breast 2/2019 revealed suspicious findings rt axilla LN.  Biopsy of the lymph node measuring 1.4 x 1.1 x 1.3 recommended    Pathology 3/11/2019   FINAL PATHOLOGIC DIAGNOSIS  Right axilla, mass, core biopsy:  Positive for poorly differentiated carcinoma.  he staining profile of the current axillary mass match more closely with the previous  breast carcinoma (due to the p63 negativity in both the 2014 breast cancer and the current axillary mass lesion but  p63 positivity in the lung mass from 2018).  This staining profile, together with the comparison with the patient's prior breast tumor and prior lung tumor supports  a diagnosis of poorly differentiated carcinoma and the malignancy appears morphologically similar to the prior  malignancies from both sites, but the staining profile in this small sample from the axillary mass more closely  correlates with the prior breast malignancy. Pathologic subclassification of this malignancy's primary location is not  definitive and clinical correlation is recommended definitively decide on possible primary location in this patient's  axillary mass sample.  Supplemental (2):  Additional immunohistochemical staining for progesterone receptor and HER2 are completed per clinician request  with following results:  Progesterone receptor: Negative; 0% nuclear tumor cell staining.  HER2: Negative; stain score = 0.  Supplemental (3):    Slides sent to [facility] for outside review  It was determined that agree with  interpretation  of this case. In my opinion, the lung tumor corresponds to a squamous cell  carcinoma and appears to be unrelated to the invasive ductal carcinoma of the breast. The axillary mass, in my  opinion, corresponds to metastases of the breast primary. Although it is a poorly differentiated carcinoma, the  immunophenotype is most consistent with the one that the breast primary exhibited, and is inconsistent with a  metastatic squamous cell carcinoma. I      Further testing sent for cancer type ID also sent and results have revealed molecular diagnosis testing revealed head and neck salivary gland carcinoma probability 84% breast adenocarcinoma could not be excluded with a 12% probability      She is s/p AC q21d x 4 cycles completed 9/6/2019   PET/CT 9/19/2019 shows disease response with interval decrease in size and uptake of several right axillary lymph nodes and a supraclavicular lymph node.  Mild residual activity may indicate viable tumor.  No new hypermetabolic findings worrisome for malignancy.    Pt followed by Rad/Onc  She was presented at [facility] tumor board and it was determined to proceed with radiation only  She  completed  RT 12/16/2019   The right axilla and right supraclavicular region was treated at 200 cGy per fraction to 4400 cGy. The PET(+) disease in the right axilla and right supraclavicular fossa will be boosted at 200 cGy per fraction to 6000 cGy total dose.    Interval Hx 6/16/2020 She continues with LOGAN  She was prescribed Levaquin and steroids per PCP for COPD exacerbation  She reports cough has improved  She is followed by Dr. Katz, pulmonology  Further w/u planned including 2-D echo, CTA chest     Interval Hx: 7/21/2020   Pt reports she underwent extensive workup and it was determined that worsening cough/SOB likely from radiation  No new issues  Appetite and weight stable    Interval Hx : 8/31/2020  No new issues  Continues with intermittent chronic cough  Chronic mild LOGAN  No  "fevers  Mild arthralgias localized rt shoulder    Recent PET/CT imaging shows Mildly increased size and mild radiotracer uptake within a small right axillary lymph node, equivocal for metastatic versus reactive etiology.  Recommend short-term follow-up to assess for stability versus evolution. Otherwise, no extrathoracic hypermetabolic tumor identified.    Interval Hx : 10/12/2020   Pt visited ED for dizziness  She was diagnosed with vertigo  She reports sx's improved with prescribed Meclizine  MRI with no stroke. No space-occupying lesions.  Today, pt concerned re " knot on right breast" x 2 days  No assoc skin changes/breast pain  She reports she noticed while showering           PrevHx:She had an abnormal mammogram 6/4/2014 which  Revealed a round solid mass 6mm in rt breast.  She then underwent U/S guided core bx of rt breast mass on 6/17/2014.  Pathology revealed infiltrating ductal carcinoma Grade 3 with tumor  Present in thin-walled spaces suggestive of lymphatic spaces. Hormone  Receptor status on tumor specimen revealed ER negative 0% ,  KY negative 0% and Her 2 jose maria negative.  She subsequently underwent rt segmental mastectomy and SLN bx  On 7/11/2014. Pathology of rt breast lumpectomy revealed invasive  Ductal carcinoma with micropapillary pattern ( Invasive micropapillary  Ca) with max tumor dimension 11.5mm with suggestion of tumor in   Thin walled spaces c/w lymphovascular involvement. Surgical  Margins free of tumor, grade 3, ER/KY neg , Her 2 jose maria neg   With [place] Lymph node negative for Neoplasm.   Pathologic staging [id number](i-)  Her mother was diagnosed with breast cancer in her 50's/  She has no family history of ovarian cancer.           Review of Systems   Constitutional: Negative for appetite change, fever and unexpected weight change.   HENT: Negative for mouth sores and rhinorrhea.    Eyes: Negative for visual disturbance.   Respiratory: Positive for cough and shortness of breath (improved). " "   Cardiovascular: Negative for chest pain.   Gastrointestinal: Negative for abdominal pain and diarrhea.   Genitourinary: Negative for frequency.   Musculoskeletal: Positive for arthralgias. Negative for back pain.   Skin: Negative for rash.   Neurological: Negative for dizziness and headaches.   Hematological: Negative for adenopathy.   Psychiatric/Behavioral: The patient is not nervous/anxious.        Objective:          Vitals:    10/12/20 0814 10/12/20 0850   BP: (!) 175/86 110/88   BP Location: Left arm Left arm   Patient Position: Sitting Sitting   BP Method: Medium (Automatic) Medium (Manual)   Pulse: (!) 57    Temp: 97.9 °F (36.6 °C)    TempSrc: Oral    SpO2: 96%    Weight: 80.5 kg (177 lb 7.5 oz)    Height: 5' 3" (1.6 m)    '    Physical Exam   Constitutional: She is oriented to person, place, and time. She appears well-developed and well-nourished.   HENT:   Head: Normocephalic.   Mouth/Throat: Oropharynx is clear and moist. No oropharyngeal exudate.   Eyes: Conjunctivae and lids are normal. Pupils are equal, round, and reactive to light. No scleral icterus.   Neck: Normal range of motion. Neck supple. No thyromegaly present.   Cardiovascular: Normal rate, regular rhythm and normal heart sounds.    No murmur heard.  Pulmonary/Chest: Breath sounds normal. She has no wheezes. She has no rales.   Abdominal: Soft. Bowel sounds are normal. She exhibits no distension and no mass. There is no hepatosplenomegaly. There is no tenderness. There is no rebound and no guarding.   Musculoskeletal: Normal range of motion. She exhibits no edema and no tenderness.   Left breast- no masses or axillary LAD noted  Right breast- firmness 11-12oc, no skin changes, no axillary lad   Lymphadenopathy:     She has no cervical adenopathy.     She has no axillary adenopathy.        Right: No supraclavicular adenopathy present.        Left: No supraclavicular adenopathy present.   Neurological: She is alert and oriented to person, " place, and time. No cranial nerve deficit. Coordination normal.   Skin: Skin is warm and dry. No ecchymosis, no petechiae and no rash noted. No erythema.   Psychiatric: She has a normal mood and affect.             CT a/p w/contrast 11/29/2018   1. No acute abnormality identified within the abdomen and pelvis.    2.  There are a few nonspecific prominent lymph nodes in the upper abdomen including a periesophageal lymph node which measures 1.0 cm in short axis diameter.    3.  Significant abdominal aortic atherosclerosis and abdominal aorta ectasia.    4.  Additional findings as above.    PET/CT 1/26/2018   1.  Intense FDG uptake within the known right infrahilar lung mass, compatible with malignancy.  It is unclear if this represents primary lung malignancy or metastatic breast cancer.    2.  Intensely hypermetabolic right axillary, retropectoral, and lower right cervical lymph nodes, compatible with metastases.    3.  Abnormal FDG uptake along right breast skin thickening, new from prior chest CTA and mammogram.  This may relate to localized edema/inflammation, though correlation with physical exam and mammography are recommended to exclude underlying inflammatory carcinoma.      MRI Brain w w/out contrast 2/9/2018  1.  No evidence of intracranial metastases.  2.  Sinus disease       Rt axillary LN bx at outside facility- on 1/16/2018 at [facility]  Pathology revealed metastatic poorly differentiated carcinoma of unknown primary  site 1/16/2018 at [facility]  TTF-1 negative, napsin negative  , cytokeratin 7 positive, cytokeratin 20 negative, p63 negative, cytokeratin 5/6 focal dim positivity    LUNG BIOPSY 1/31/2018  Pathology revealed benign lung tissue         SPECIMEN  1) Right Lung Bx 2/14/2018  Supplemental Diagnosis  Immunohistochemical stains show strong nuclear staining for p63 in essentially all tumor cells and very strong  cytoplasmic and membrane staining for CK5/6, also in  essentially all tumor cells. TTF-1 and CK7 are negative  within tumor cells but do stain native pulmonary elements present within the biopsy. A stain for mucicarmine is  negative. Positive and negative controls function appropriately.  Final diagnosis: Specimen submitted as right lung biopsy  -Squamous cell carcinoma  (Electronically Signed: 2018-02-20 09:12:07 )  Diagnosed by: Phong Thompson  FINAL PATHOLOGIC DIAGNOSIS  Fragments of pulmonary parenchyma (submitted as right lung biopsy):    FINAL PATHOLOGIC DIAGNOSIS 1. Lymph node, right axilla, biopsy, review of 10 outside slides [facility], JS 18 1 088,  collected January 11, 2018: Metastatic poorly differentiated carcinoma (see comment).  The histologic section shows fibrous stroma infiltrated by nest of poorly differentiated malignant cells including  occasional dyskeratotic cells morphologically suggestive of metastatic squamous cell carcinoma.  Immunohistochemical stains are nonspecific; the cells are positive for cytokeratin 7 and cytokeratin 5/6 (focal) and  negative for cytokeratin 20, TTF-1, p63, GCDFP, mammoglobin, and CEA        PET/CT 2/21/2019  Increased size and irregularity of the right axillary lymph node with increased FDG avidity.  Although this would be atypical in location for lung carcinoma, the increased size and avidity must be concerning for metastatic disease in this patient with history of squamous cell lung cancer.     US Rt breast and mammo 2/26/2019  Impression:  Right  Lymph Node: Right axilla lymph node. Assessment: 4 - Suspicious finding. Biopsy is recommended.      BI-RADS Category:   Right: 4 - Suspicious  Overall: 4 - Suspicious     Recommendation:  Biopsy is recommended. Biopsy of the lymph node measuring 1.4 x 1.1 x 1.3 recommended , the one with the round shape configuration, corresponding to the most recent PET CT finding.         Pathology 3/11/2019   FINAL PATHOLOGIC DIAGNOSIS  Right axilla, mass, core  biopsy:  Positive for poorly differentiated carcinoma.  See comment.  Comment:  The biopsy from the right axilla mass shows a poorly differentiated carcinoma in background lymphoid tissue  with enlarged cells showing increased nuclear size, prominent nucleoli, moderate amounts of cytoplasm and mitotic  figures. Immunostains are completed and reveal the tumor cells to stain positively with cytokeratin AE 1/AE3, CK  5/6 and CK 7. The tumor cells are negative for CK 20, Estrogen receptor, p63 and TTF-1. All stains have  satisfactory positive and negative controls. The patient's prior cases will be reviewed and an additional stain for  JUAREZ-3 is pending in an attempt to pinpoint the primary site of this malignancy and results will follow in a  supplemental report.      Supplemental Diagnosis  3/11/2019  The current axillary mass is compared to the patient's prior right lung biopsy (case number [id number]) and the  current axillary mass is morphologically similar to the lung malignancy. Also, the current axillary mass is compared  to the patient's prior breast resection (Case number [id number]) and appears similar as well. P63 is negative in the  [id number] tumor and CK 5/6 shows scattered positive staining in the [id number] tumor.  Immunostain for JUAREZ-3 is completed and shows only rare weak to moderate staining within the tumor cell nuclei  with satisfactory positive and negative controls. This stain is positive in the surrounding lymphocytes. This staining  pattern is non-specific and does not definitively differentiate between a lung and breast primary malignancy in this  right axilla mass biopsy. The staining profile of the current axillary mass match more closely with the previous  breast carcinoma (due to the p63 negativity in both the 2014 breast cancer and the current axillary mass lesion but  p63 positivity in the lung mass from 2018).  This staining profile, together with the comparison with the patient's prior  breast tumor and prior lung tumor supports  a diagnosis of poorly differentiated carcinoma and the malignancy appears morphologically similar to the prior  malignancies from both sites, but the staining profile in this small sample from the axillary mass more closely  correlates with the prior breast malignancy. Pathologic subclassification of this malignancy's primary location is not  definitive and clinical correlation is recommended definitively decide on possible primary location in this patient's  axillary mass sample.  Supplemental (2):  Additional immunohistochemical staining for progesterone receptor and HER2 are completed per clinician request  with following results:  Progesterone receptor: Negative; 0% nuclear tumor cell staining.  HER2: Negative; stain score = 0.  Supplemental (3):  [place] DIAGNOSIS:  FINAL DIAGNOSIS  Breast, right, needle core biopsy ([id number]; 06/17/2014): Invasive ductal carcinoma, Cool grade III (of  III), with focal micropapillary features.  Immunohistochemical stains performed at the referring institution show that the tumor cells have the following  phenotype: - Estrogen receptor: Negative (0% tumor cells staining). - Progesterone receptor: Negative (0% tumor  cells staining). - HER2: Negative (score 0).  Lymph nodes, right, sentinel biopsy and lumpectomy ([id number]; 07/11/2014):  1. Right sentinel lymph node: A single (1) lymph node is negative for metastatic carcinoma.  Immunohistochemical stains performed by the referring institution and reviewed at [facility] for cytokeratin are  negative, confirming the diagnosis.  2. Right breast: Invasive ductal carcinoma with micropapillary features, per report 11.5 mm in greatest dimension.  Foci suspicious for lymphovascular invasion identified. Margins of resection are free of tumor.  Immunohistochemical stains performed by the referring institution and reviewed at [facility] show that the tumor  cells are negative for p63 and  show patchy positivity for CK 5/6.  Lung, right, needle core biopsy ([id number]; 02/14/2018): Squamous cell carcinoma.    Immunohistochemical stains performed by the referring institution show the tumor cells are strongly positive for p63  and CK 5/6, while negative for TTF-1 and CK7. These result support the diagnosis. Axilla, right, needle core biopsy  ([id number]; 03/11/2019): Lymph node positive for metastatic carcinoma, most consistent with breast primary  (see comment).  Immunohistochemical stains performed by the referring institution and reviewed at [facility] show that the tumor  cells are negative for p63, focally positive for CK 5/6, focally and weakly positive for JUAREZ-3, and strongly positive  for CK7. They are also negative for estrogen receptor, progesterone receptor, and HER2 JAM (score 0).  COMMENT:  Thank you for allowing me to review the case of this 73-year-old lady who recently underwent needle core biopsies  of a right axillary mass and who has a previous diagnosis of an invasive ductal carcinoma of the right breast, as well  as a squamous cell carcinoma of the lung. I am reviewing this case because of my special interest in breast  pathology.  I agree with your interpretation of this case. In my opinion, the lung tumor corresponds to a squamous cell  carcinoma and appears to be unrelated to the invasive ductal carcinoma of the breast. The axillary mass, in my  opinion, corresponds to metastases of the breast primary. Although it is a poorly differentiated carcinoma, the  immunophenotype is most consistent with the one that the breast primary exhibited, and is inconsistent with a  metastatic squamous cell carcinoma. I did share the case with Dr. Anabel Stone, one of our pulmonary pathologists,  and she concurs with this interpretation.  Note: Report attached.  Performing location:  [address] [address]    CT neck/chest/abd/pelvis  w/contrast 4/26/2019   The previously described right hilar mass is no longer visible.  There is persistent volume loss in the right middle lobe this appears similar to the prior PET-CT February 21, 2019.    Persistent abnormal right axillary node today measuring about 15 mm compared 18 mm prior.  The positioning of the adjacent nodes is slightly different likely accounting for the slight difference in measurement.    Cluster of tree-in-bud nodular densities left lower lobe series 2, image 93.  This may be related to small airways disease though serial follow-up suggested.      PET/CT 6/20/2019   New and worsening hypermetabolic lymph nodes concerning for metastatic breast cancer as described above.  Tissue sampling would be required for definitive diagnosis.      2-D echo 6/27/2019   · Normal left ventricular systolic function. The estimated ejection fraction is 55%  · Concentric left ventricular remodeling.  · Normal LV diastolic function.  · Normal right ventricular systolic function.  · Moderate left atrial enlargement.  · Mild right atrial enlargement.  · Mild aortic regurgitation.  · Mild mitral regurgitation.  · Mild tricuspid regurgitation.  · Normal central venous pressure (3 mm Hg).  · The estimated PA systolic pressure is 25 mm Hg      PET/CT 9/19/2019   Favorable response to therapy with interval decrease in size and uptake of several right axillary lymph nodes and a supraclavicular lymph node.  Mild residual activity may indicate viable tumor.    No new hypermetabolic findings worrisome for malignancy.    PET/CT 2/28/2020  1.  No evidence of hypermetabolic tumor.  Continued improvement of the right axilla status post adjuvant radiation.    2.  No new hypermetabolic lesions      CTA 6/17/2020  1. No evidence of pulmonary embolus. No aortic dissection.   2. There is no evidence for new or progressive disease in the chest as compared to PET/CT 6/20/2019 in this patient with history of right breast cancer  and right lung neoplasm. The patient does have a more recent PET/CT 2/28/2020 from an outside facility per the electronic medical record although images are not available for direct comparison. Correlation with these images may be beneficial. Continued long-term surveillance recommended.  3. Stable appearance of the treated tumor in the right hilum/middle lobe.  4. Stable treated right breast cancer and axillary adenopathy without evidence for developing breast mass or new right axillary adenopathy.  5. Stable tiny nodularity/tree-in-bud densities in the left lung, probably postinfectious or inflammatory.  6. Wall thickening of the esophagus may be correlated for esophagitis. Possible tiny hiatus hernia.  7. Slight bronchial wall thickening may be correlated for bronchitis.  8. Mild to moderate emphysema as before.  9. Reflux of contrast into the IVC and hepatic veins is nonspecific but may be correlated for elevated right heart pressures.  10. Coronary calcifications and/or stents similar to prior.    Echo 5/21/2020  Technically difficult study.   Normal left ventricular systolic function.   Left ventricular ejection fraction is estimated at 55%.   Severe mitral annular calcification.   Mild mitral valve regurgitation.   Aortic valve sclerosis without stenosis or regurgitation.     PFTs 6/17/2020  Spirometry reveals a very severe obstructive lung defect, which does not significantly improve post bronchodilators. Lung volumes revealed air trapping. Diffusing capacity was moderately impaired.        Del 6/26/2020  BI-RADS Category:   Overall: 2 - Benign      PET/CT 8/28/2020  Impression:     Mildly increased size and mild radiotracer uptake within a small right axillary lymph node, equivocal for metastatic versus reactive etiology.  Recommend short-term follow-up to assess for stability versus evolution.     Otherwise, no extrathoracic hypermetabolic tumor identified.      Assessment:       1. Recurrent breast  cancer, unspecified laterality    2. Malignant neoplasm of central portion of right breast in female, estrogen receptor negative     3. Lump of breast, right    4. Abnormal positron emission tomography (PET) scan    5.  History of Squamous cell carcinoma of right lung    6. 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  S/p adjuvant RT completed 9/2014 Pt declined adjuvant chemo  Follow-up Mammo 6/12/2017 No mammographic evidence of malignancy.  Pt  hospitalized 11/2017 with acute-on-chronic  resp failure  Abnormal CT imaging revealing Large right hilar mass with involvement of  adjacent segmental pulmonary arterial branches and bronchi with associated postobstructive atelectasis  and involvement of mediastinal and axillary adenopathy   S/p rt axillary LN bx ( outside facility) - metastatic poorly differentiated carcinoma of unknown primary  site  status post  lung biopsy 1/31/2017 -benign lung tissue  PET/CT 1/26/2018   Intense FDG uptake within the known right infrahilar lung mass, compatible with malignancy.   Intensely hypermetabolic right axillary, retropectoral, and lower right cervical lymph nodes, compatible with metastases.  Abnormal FDG uptake along right breast skin thickening, new from prior chest CTA and mammogram.    Repeat lung bx 2/14/2018 revealed Squamous Cell CA lung  PD-L1 10% low expression  EGFR NEG  ALK NEG    Pt treated for advanced squamous cell CA of lung   She s/p  cycle 6 of carboplatin/Abraxane Day1 completed 7/2/2018 ( day 15 held due to prolonged cytopenias)  She completed therapy with near CR     PET/CT 2/21/2019 showed increased size and irregularity of the right axillary lymph node with increased FDG avidity    t MAMMO/US rt breast 3/2019  reveals suspicious findings rt axilla LN.  Biopsy of the lymph  node measuring 1.4 x 1.1 x 1.3     Pt s/p  rt axillary LN bx  Pathology 3/11/2019   FINAL PATHOLOGIC DIAGNOSIS  Right axilla, mass, core biopsy:  Positive for poorly differentiated carcinoma.- likely breast primary   ERneg PRneg Her 2 neg    Outside review of specimen(s) revealed  lung tumor corresponds to a squamous cell carcinoma and appears to be unrelated to the invasive ductal carcinoma of the breast. It was determined The axillary mass, in my opinion, corresponded  to metastases of the breast primary. Although it is a poorly differentiated carcinoma, theimmunophenotype is most consistent with the one that the breast primary exhibited, and is inconsistent with a metastatic squamous cell carcinoma.     CT imaging 4/26/2019 persistent rt axillary LAD, no other sites of disease     Pf followed by Rad/Onc regarding RT     Lymph node biopsy 3/11/2019 Right axilla, mass, core biopsy:  Positive for poorly differentiated carcinoma.   favor breast primary   Pt was discussed at multidisciplinary tumor board  D/w Pathologist    PET/CT 6/20/2019  New and worsening hypermetabolic lymph nodes concerning for metastatic breast cancer     Previously Discussed  imaging findings in detail with patient which reveals new and worsening hypermetabolic melena metabolic lymph nodes including hypermetabolic supraclavicular node.  Therefore, at treatment option will be systemic chemotherapy in light of the recent imaging findings.  She will be followed by her surgical oncologist Dr. Christopher      IT was determined to proceed with systemic chemotherapy   S/p  AC [id number] x 3 wks x 4 wks completed 9/6/2019   ( pt has not received in past)      PET/CT 9/19/2019 shows disease response with interval decrease in size and uptake of several right axillary lymph nodes and a supraclavicular lymph node.  Mild residual activity may indicate viable tumor.  No new hypermetabolic findings worrisome for malignancy.    Pt followed by  Breast Surgery and  plan was  for possible   ALND   Pt with Recurrent cancer in her right axilla and right supraclavicular fossa.   She completed chemotherapy 9/6/2019  and has had a near complete response.  It was determined  Radiation will be given to the original areas of hypermetabolic activity in the right axilla and right supraclavicular region  Pt was presented at multidisciplinary tumor board    Pt completed  RT 12/16/2019      PET/CT 2/28/2020  No evidence of hypermetabolic tumor.  Continued improvement of the right axilla status post adjuvant radiation.    No new hypermetabolic lesions     Recent PET/CT imaging 8/2020 -which shows Mildly increased size and mild radiotracer uptake within a small right axillary lymph node, equivocal for metastatic versus reactive etiology.   no extrathoracic hypermetabolic tumor identified.    Plan PET/CT imaging for close follow-up   Plan Right Mammo and US for further evalauton     Follow-up 4 weeks with cbc,cmp prior to f/u     Advance Care Planning     Power of   I previously  initiated the process of advance care planning today and explained the importance of this process to the patient.  I introduced the concept of advance directives to the patient, as well. Then the patient received detailed information about the importance of designating a Health Care Power of  (HCPOA). She was also instructed to communicate with this person about their wishes for future healthcare, should she become sick and lose decision-making capacity. The patient has previously appointed a HCPOA. After our discussion, the patient has decided to complete a HCPOA and has appointed her son and niece and  I spent a total time of 16 minutes discussing this issue with the patient.         Cc: Swetha Katz M.D.         MD Tory Roberson MD Raymond Gould, MD

## 2020-10-12 NOTE — PLAN OF CARE
Patient arrived to unit afebrile for Port flush. Pt continues with cough, also reports new breast lump that is causing minor pain. Discussed with Dr. Goldstein and will have mammogram and pet scan this month. Pt also reported ER visit due to vertigo, Antivert for dizziness/spinning added to medication regimen Plan of care reviewed, patient agreeable to plan. Patient tolerated port flush well. VSS. Discharge instructions reviewed, patient instructed to return prior to MD appt on Nov 12 for labs/port flush. Patient ambulated off unit unassisted by self. Patient in NAD at time of discharge.

## 2020-10-23 ENCOUNTER — HOSPITAL ENCOUNTER (OUTPATIENT)
Dept: RADIOLOGY | Facility: HOSPITAL | Age: 75
Discharge: HOME | End: 2020-10-23
Attending: INTERNAL MEDICINE
Payer: MEDICARE

## 2020-10-23 DIAGNOSIS — C50.111 MALIGNANT NEOPLASM OF CENTRAL PORTION OF RIGHT BREAST IN FEMALE, ESTROGEN RECEPTOR NEGATIVE: ICD-10-CM

## 2020-10-23 DIAGNOSIS — C34.91 SQUAMOUS CELL CARCINOMA OF RIGHT LUNG: ICD-10-CM

## 2020-10-23 DIAGNOSIS — Z17.1 MALIGNANT NEOPLASM OF CENTRAL PORTION OF RIGHT BREAST IN FEMALE, ESTROGEN RECEPTOR NEGATIVE: ICD-10-CM

## 2020-10-23 DIAGNOSIS — R94.8 ABNORMAL POSITRON EMISSION TOMOGRAPHY (PET) SCAN: ICD-10-CM

## 2020-10-23 PROCEDURE — 78815 NM PET CT ROUTINE: ICD-10-PCS | Mod: 26,PS,, | Performed by: RADIOLOGY

## 2020-10-23 PROCEDURE — 78815 PET IMAGE W/CT SKULL-THIGH: CPT | Mod: TC,PS

## 2020-10-23 PROCEDURE — A9552 F18 FDG: HCPCS

## 2020-10-23 PROCEDURE — 78815 PET IMAGE W/CT SKULL-THIGH: CPT | Mod: 26,PS,, | Performed by: RADIOLOGY

## 2020-11-04 ENCOUNTER — HOSPITAL ENCOUNTER (OUTPATIENT)
Dept: RADIOLOGY | Facility: HOSPITAL | Age: 75
Discharge: HOME | End: 2020-11-04
Attending: INTERNAL MEDICINE
Payer: MEDICARE

## 2020-11-04 DIAGNOSIS — N63.10 LUMP OF BREAST, RIGHT: ICD-10-CM

## 2020-11-04 DIAGNOSIS — C50.111 MALIGNANT NEOPLASM OF CENTRAL PORTION OF RIGHT BREAST IN FEMALE, ESTROGEN RECEPTOR NEGATIVE: ICD-10-CM

## 2020-11-04 DIAGNOSIS — Z17.1 MALIGNANT NEOPLASM OF CENTRAL PORTION OF RIGHT BREAST IN FEMALE, ESTROGEN RECEPTOR NEGATIVE: ICD-10-CM

## 2020-11-04 PROCEDURE — 76642 ULTRASOUND BREAST LIMITED: CPT | Mod: 26,HCNC,RT, | Performed by: RADIOLOGY

## 2020-11-04 PROCEDURE — 76642 US BREAST RIGHT LIMITED: ICD-10-PCS | Mod: 26,HCNC,RT, | Performed by: RADIOLOGY

## 2020-11-04 PROCEDURE — 76642 ULTRASOUND BREAST LIMITED: CPT | Mod: TC,HCNC,RT

## 2020-11-04 PROCEDURE — 77061 MAMMO DIGITAL DIAGNOSTIC RIGHT WITH TOMO: ICD-10-PCS | Mod: 26,HCNC,RT, | Performed by: RADIOLOGY

## 2020-11-04 PROCEDURE — 77065 DX MAMMO INCL CAD UNI: CPT | Mod: 26,HCNC,RT, | Performed by: RADIOLOGY

## 2020-11-04 PROCEDURE — 77065 DX MAMMO INCL CAD UNI: CPT | Mod: TC,HCNC,RT

## 2020-11-04 PROCEDURE — 77061 BREAST TOMOSYNTHESIS UNI: CPT | Mod: 26,HCNC,RT, | Performed by: RADIOLOGY

## 2020-11-04 PROCEDURE — 77065 MAMMO DIGITAL DIAGNOSTIC RIGHT WITH TOMO: ICD-10-PCS | Mod: 26,HCNC,RT, | Performed by: RADIOLOGY

## 2020-11-04 PROCEDURE — 77061 BREAST TOMOSYNTHESIS UNI: CPT | Mod: TC,HCNC,RT

## 2020-11-12 ENCOUNTER — INFUSION (OUTPATIENT)
Dept: INFUSION THERAPY | Facility: HOSPITAL | Age: 75
End: 2020-11-12
Attending: INTERNAL MEDICINE
Payer: MEDICARE

## 2020-11-12 ENCOUNTER — OFFICE VISIT (OUTPATIENT)
Dept: HEMATOLOGY/ONCOLOGY | Facility: CLINIC | Age: 75
End: 2020-11-12
Payer: MEDICARE

## 2020-11-12 VITALS
BODY MASS INDEX: 31.13 KG/M2 | HEIGHT: 63 IN | TEMPERATURE: 98 F | SYSTOLIC BLOOD PRESSURE: 115 MMHG | OXYGEN SATURATION: 95 % | WEIGHT: 175.69 LBS | HEART RATE: 57 BPM | DIASTOLIC BLOOD PRESSURE: 80 MMHG

## 2020-11-12 DIAGNOSIS — C34.90 SQUAMOUS CELL CARCINOMA OF LUNG, UNSPECIFIED LATERALITY: Primary | ICD-10-CM

## 2020-11-12 DIAGNOSIS — C34.91 SQUAMOUS CELL CARCINOMA OF RIGHT LUNG: ICD-10-CM

## 2020-11-12 DIAGNOSIS — R06.09 DOE (DYSPNEA ON EXERTION): ICD-10-CM

## 2020-11-12 DIAGNOSIS — J44.9 CHRONIC OBSTRUCTIVE PULMONARY DISEASE, UNSPECIFIED COPD TYPE: ICD-10-CM

## 2020-11-12 DIAGNOSIS — C50.919 RECURRENT BREAST CANCER, UNSPECIFIED LATERALITY: Primary | ICD-10-CM

## 2020-11-12 DIAGNOSIS — C34.90 SQUAMOUS CELL CARCINOMA LUNG: ICD-10-CM

## 2020-11-12 LAB
ALBUMIN SERPL BCP-MCNC: 4.1 G/DL (ref 3.5–5.2)
ALP SERPL-CCNC: 51 U/L (ref 55–135)
ALT SERPL W/O P-5'-P-CCNC: 17 U/L (ref 10–44)
ANION GAP SERPL CALC-SCNC: 11 MMOL/L (ref 8–16)
AST SERPL-CCNC: 19 U/L (ref 10–40)
BILIRUB SERPL-MCNC: 0.5 MG/DL (ref 0.1–1)
BUN SERPL-MCNC: 13 MG/DL (ref 8–23)
CALCIUM SERPL-MCNC: 9 MG/DL (ref 8.7–10.5)
CHLORIDE SERPL-SCNC: 102 MMOL/L (ref 95–110)
CO2 SERPL-SCNC: 27 MMOL/L (ref 23–29)
CREAT SERPL-MCNC: 0.7 MG/DL (ref 0.5–1.4)
ERYTHROCYTE [DISTWIDTH] IN BLOOD BY AUTOMATED COUNT: 14.1 % (ref 11.5–14.5)
EST. GFR  (AFRICAN AMERICAN): >60 ML/MIN/1.73 M^2
EST. GFR  (NON AFRICAN AMERICAN): >60 ML/MIN/1.73 M^2
GLUCOSE SERPL-MCNC: 121 MG/DL (ref 70–110)
HCT VFR BLD AUTO: 39.1 % (ref 37–48.5)
HGB BLD-MCNC: 13 G/DL (ref 12–16)
IMM GRANULOCYTES # BLD AUTO: 0.02 K/UL (ref 0–0.04)
MCH RBC QN AUTO: 30.4 PG (ref 27–31)
MCHC RBC AUTO-ENTMCNC: 33.2 G/DL (ref 32–36)
MCV RBC AUTO: 91 FL (ref 82–98)
NEUTROPHILS # BLD AUTO: 2.6 K/UL (ref 1.8–7.7)
PLATELET # BLD AUTO: 149 K/UL (ref 150–350)
PMV BLD AUTO: 11.9 FL (ref 9.2–12.9)
POTASSIUM SERPL-SCNC: 3.8 MMOL/L (ref 3.5–5.1)
PROT SERPL-MCNC: 6.7 G/DL (ref 6–8.4)
RBC # BLD AUTO: 4.28 M/UL (ref 4–5.4)
SODIUM SERPL-SCNC: 140 MMOL/L (ref 136–145)
WBC # BLD AUTO: 4.22 K/UL (ref 3.9–12.7)

## 2020-11-12 PROCEDURE — 1159F MED LIST DOCD IN RCRD: CPT | Mod: HCNC,S$GLB,, | Performed by: INTERNAL MEDICINE

## 2020-11-12 PROCEDURE — 1126F AMNT PAIN NOTED NONE PRSNT: CPT | Mod: HCNC,S$GLB,, | Performed by: INTERNAL MEDICINE

## 2020-11-12 PROCEDURE — 80053 COMPREHEN METABOLIC PANEL: CPT | Mod: HCNC

## 2020-11-12 PROCEDURE — 3008F BODY MASS INDEX DOCD: CPT | Mod: HCNC,CPTII,S$GLB, | Performed by: INTERNAL MEDICINE

## 2020-11-12 PROCEDURE — 3079F PR MOST RECENT DIASTOLIC BLOOD PRESSURE 80-89 MM HG: ICD-10-PCS | Mod: HCNC,CPTII,S$GLB, | Performed by: INTERNAL MEDICINE

## 2020-11-12 PROCEDURE — 3288F PR FALLS RISK ASSESSMENT DOCUMENTED: ICD-10-PCS | Mod: HCNC,CPTII,S$GLB, | Performed by: INTERNAL MEDICINE

## 2020-11-12 PROCEDURE — 3288F FALL RISK ASSESSMENT DOCD: CPT | Mod: HCNC,CPTII,S$GLB, | Performed by: INTERNAL MEDICINE

## 2020-11-12 PROCEDURE — 3074F SYST BP LT 130 MM HG: CPT | Mod: HCNC,CPTII,S$GLB, | Performed by: INTERNAL MEDICINE

## 2020-11-12 PROCEDURE — 1157F PR ADVANCE CARE PLAN OR EQUIV PRESENT IN MEDICAL RECORD: ICD-10-PCS | Mod: HCNC,S$GLB,, | Performed by: INTERNAL MEDICINE

## 2020-11-12 PROCEDURE — 1126F PR PAIN SEVERITY QUANTIFIED, NO PAIN PRESENT: ICD-10-PCS | Mod: HCNC,S$GLB,, | Performed by: INTERNAL MEDICINE

## 2020-11-12 PROCEDURE — 3074F PR MOST RECENT SYSTOLIC BLOOD PRESSURE < 130 MM HG: ICD-10-PCS | Mod: HCNC,CPTII,S$GLB, | Performed by: INTERNAL MEDICINE

## 2020-11-12 PROCEDURE — 1159F PR MEDICATION LIST DOCUMENTED IN MEDICAL RECORD: ICD-10-PCS | Mod: HCNC,S$GLB,, | Performed by: INTERNAL MEDICINE

## 2020-11-12 PROCEDURE — 1101F PT FALLS ASSESS-DOCD LE1/YR: CPT | Mod: HCNC,CPTII,S$GLB, | Performed by: INTERNAL MEDICINE

## 2020-11-12 PROCEDURE — 99999 PR PBB SHADOW E&M-EST. PATIENT-LVL V: CPT | Mod: PBBFAC,HCNC,, | Performed by: INTERNAL MEDICINE

## 2020-11-12 PROCEDURE — 1101F PR PT FALLS ASSESS DOC 0-1 FALLS W/OUT INJ PAST YR: ICD-10-PCS | Mod: HCNC,CPTII,S$GLB, | Performed by: INTERNAL MEDICINE

## 2020-11-12 PROCEDURE — 63600175 PHARM REV CODE 636 W HCPCS: Mod: HCNC | Performed by: INTERNAL MEDICINE

## 2020-11-12 PROCEDURE — 99499 RISK ADDL DX/OHS AUDIT: ICD-10-PCS | Mod: S$GLB,,, | Performed by: INTERNAL MEDICINE

## 2020-11-12 PROCEDURE — 1157F ADVNC CARE PLAN IN RCRD: CPT | Mod: HCNC,S$GLB,, | Performed by: INTERNAL MEDICINE

## 2020-11-12 PROCEDURE — 3079F DIAST BP 80-89 MM HG: CPT | Mod: HCNC,CPTII,S$GLB, | Performed by: INTERNAL MEDICINE

## 2020-11-12 PROCEDURE — 36591 DRAW BLOOD OFF VENOUS DEVICE: CPT | Mod: HCNC

## 2020-11-12 PROCEDURE — 99215 PR OFFICE/OUTPT VISIT, EST, LEVL V, 40-54 MIN: ICD-10-PCS | Mod: HCNC,S$GLB,, | Performed by: INTERNAL MEDICINE

## 2020-11-12 PROCEDURE — 25000003 PHARM REV CODE 250: Mod: HCNC | Performed by: INTERNAL MEDICINE

## 2020-11-12 PROCEDURE — 3008F PR BODY MASS INDEX (BMI) DOCUMENTED: ICD-10-PCS | Mod: HCNC,CPTII,S$GLB, | Performed by: INTERNAL MEDICINE

## 2020-11-12 PROCEDURE — 99215 OFFICE O/P EST HI 40 MIN: CPT | Mod: HCNC,S$GLB,, | Performed by: INTERNAL MEDICINE

## 2020-11-12 PROCEDURE — 99999 PR PBB SHADOW E&M-EST. PATIENT-LVL V: ICD-10-PCS | Mod: PBBFAC,HCNC,, | Performed by: INTERNAL MEDICINE

## 2020-11-12 PROCEDURE — 99499 UNLISTED E&M SERVICE: CPT | Mod: S$GLB,,, | Performed by: INTERNAL MEDICINE

## 2020-11-12 PROCEDURE — 85027 COMPLETE CBC AUTOMATED: CPT | Mod: HCNC

## 2020-11-12 PROCEDURE — A4216 STERILE WATER/SALINE, 10 ML: HCPCS | Mod: HCNC | Performed by: INTERNAL MEDICINE

## 2020-11-12 RX ORDER — SODIUM CHLORIDE 0.9 % (FLUSH) 0.9 %
10 SYRINGE (ML) INJECTION
Status: DISCONTINUED | OUTPATIENT
Start: 2020-11-12 | End: 2020-11-12 | Stop reason: HOSPADM

## 2020-11-12 RX ORDER — HEPARIN 100 UNIT/ML
500 SYRINGE INTRAVENOUS
Status: DISCONTINUED | OUTPATIENT
Start: 2020-11-12 | End: 2020-11-12 | Stop reason: HOSPADM

## 2020-11-12 RX ORDER — UBIDECARENONE 30 MG
30 CAPSULE ORAL DAILY
COMMUNITY
End: 2021-04-05

## 2020-11-12 RX ADMIN — Medication 10 ML: at 09:11

## 2020-11-12 RX ADMIN — HEPARIN 500 UNITS: 100 SYRINGE at 09:11

## 2020-11-12 NOTE — PROGRESS NOTES
Subjective:       Patient ID: Ade Castellano is a 75 y.o. female.    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   Pathology showed a 1.15 cm, grade 3 infiltrating ductal carcinoma.  One sentinel lymph node was negative for malignancy.  Margins were negative and the closest margin was 1 cm.  She was staged a little pT1c little pN0 cM0 stage IA.  She declined adjuvant chemo therapy.  She completed post operative radiation therapy at 400 cGy per fraction to 4000 cGy 9/2014         Pt hospitalized 11/2017   Ms. Castellano was admitted for acute on chronic respiratory failure requiring intubation and ventilation. Has large lung mass in right lung with probable post obstructive pneumonia resulting in COPD exacerbation. But also, patient had ran out of home O2 prior to onset of symptoms, likely contributing to presentation. Initiated on broad spectrum abx, IV steroids, duo-nebs and pulmonology consulted for ventilator co-management. Successfully extubated to BiPAP on 11/30. Then de-escalated to low flow nasal cannula. Abx tailored to augmentin for broad coverage, including anaerobic bacteria /     CTA chest 11/29/2017 revealed   Large right hilar mass with involvement of adjacent segmental pulmonary arterial branches and bronchi with associated postobstructive atelectasis and volume loss in the right middle lobe with adjacent ground glass opacity.  Findings highly concerning for underlying neoplastic process. Mediastinal and axillary adenopathy, with a right axillary lymph node measuring up to 2.0  cm in short axis diameter. No definite evidence of pulmonary thromboembolism.    She is followed by Pulmonology   She underwent rt axillary LN bx at outside facility- on 1/16/2018 [facility]  Pathology revealed metastatic poorly differentiated carcinoma of unknown primary site  TTF-1 negative, napsin negative  , cytokeratin 7 positive, cytokeratin 20 negative, p63 negative, cytokeratin 5/6 focal dim positivity    She next underwent lung bx at [facility] 1/31/2018   Pathology revealed benign lung tissue    She underwent Repeat Right Lung Bx 2/14/2018 Pathology reveals Squamous cell carcinoma  PD-L1 10% low expression  EGFR NEG  ALK NEG  BRAF NEG      Outside slides axillary LN specimen  requested for review/comparison to lung bx findings     1) Right Lung Bx 2/14/2018  Supplemental Diagnosis  Immunohistochemical stains show strong nuclear staining for p63 in essentially all tumor cells and very strong  cytoplasmic and membrane staining for CK5/6, also in essentially all tumor cells. TTF-1 and CK7 are negative  within tumor cells but do stain native pulmonary elements present within the biopsy. A stain for mucicarmine is  negative. Positive and negative controls function appropriately.  Final diagnosis: Specimen submitted as right lung biopsy  -Squamous cell carcinoma  (Electronically Signed: 2018-02-20 09:12:07 )  Diagnosed by: Phong Thompson  FINAL PATHOLOGIC DIAGNOSIS  Fragments of pulmonary parenchyma (submitted as right lung biopsy):    FINAL PATHOLOGIC DIAGNOSIS 1. Lymph node, right axilla, biopsy, review of 10 outside slides [facility], JS 18 1 [id number],  collected January 11, 2018: Metastatic poorly differentiated carcinoma (see comment).  The histologic section shows fibrous stroma infiltrated by nest of poorly differentiated malignant cells including  occasional dyskeratotic cells morphologically suggestive of metastatic squamous cell carcinoma.  Immunohistochemical stains are  nonspecific; the cells are positive for cytokeratin 7 and cytokeratin 5/6 (focal) and  negative for cytokeratin 20, TTF-1, p63, GCDFP, mammoglobin, and CEA      PET/CT  4/19/2018 revealed there has been a excellent response to therapy.  At least 90% reduction in malignant activity.    She s/p  cycle 6 of carboplatin/Abraxane completed 7/2018        PET/CT 2/21/2019 increased size and irregularity of the right axillary lymph node with increased FDG avidity     MAMMO/US rt breast 2/2019 revealed suspicious findings rt axilla LN.  Biopsy of the lymph node measuring 1.4 x 1.1 x 1.3 recommended    Pathology 3/11/2019   FINAL PATHOLOGIC DIAGNOSIS  Right axilla, mass, core biopsy:  Positive for poorly differentiated carcinoma.  he staining profile of the current axillary mass match more closely with the previous  breast carcinoma (due to the p63 negativity in both the 2014 breast cancer and the current axillary mass lesion but  p63 positivity in the lung mass from 2018).  This staining profile, together with the comparison with the patient's prior breast tumor and prior lung tumor supports  a diagnosis of poorly differentiated carcinoma and the malignancy appears morphologically similar to the prior  malignancies from both sites, but the staining profile in this small sample from the axillary mass more closely  correlates with the prior breast malignancy. Pathologic subclassification of this malignancy's primary location is not  definitive and clinical correlation is recommended definitively decide on possible primary location in this patient's  axillary mass sample.  Supplemental (2):  Additional immunohistochemical staining for progesterone receptor and HER2 are completed per clinician request  with following results:  Progesterone receptor: Negative; 0% nuclear tumor cell staining.  HER2: Negative; stain score = 0.  Supplemental (3):    Slides sent to [facility] for outside review  It was determined that agree with   interpretation of this case. In my opinion, the lung tumor corresponds to a squamous cell  carcinoma and appears to be unrelated to the invasive ductal carcinoma of the breast. The axillary mass, in my  opinion, corresponds to metastases of the breast primary. Although it is a poorly differentiated carcinoma, the  immunophenotype is most consistent with the one that the breast primary exhibited, and is inconsistent with a  metastatic squamous cell carcinoma. I      Further testing sent for cancer type ID also sent and results have revealed molecular diagnosis testing revealed head and neck salivary gland carcinoma probability 84% breast adenocarcinoma could not be excluded with a 12% probability      She is s/p AC q21d x 4 cycles completed 9/6/2019   PET/CT 9/19/2019 shows disease response with interval decrease in size and uptake of several right axillary lymph nodes and a supraclavicular lymph node.  Mild residual activity may indicate viable tumor.  No new hypermetabolic findings worrisome for malignancy.    Pt followed by Rad/Onc  She was presented at [facility] tumor board and it was determined to proceed with radiation only  She  completed  RT 12/16/2019   The right axilla and right supraclavicular region was treated at 200 cGy per fraction to 4400 cGy. The PET(+) disease in the right axilla and right supraclavicular fossa will be boosted at 200 cGy per fraction to 6000 cGy total dose.    Interval Hx 6/16/2020 She continues with LOGAN  She was prescribed Levaquin and steroids per PCP for COPD exacerbation  She reports cough has improved  She is followed by Dr. Katz, pulmonology  Further w/u planned including 2-D echo, CTA chest     Interval Hx: 7/21/2020   Pt reports she underwent extensive workup and it was determined that worsening cough/SOB likely from radiation  No new issues  Appetite and weight stable    Interval Hx : 8/31/2020  No new issues  Continues with intermittent chronic cough  Chronic mild  LOGAN  No fevers  Mild arthralgias localized rt shoulder    Recent PET/CT imaging shows Mildly increased size and mild radiotracer uptake within a small right axillary lymph node, equivocal for metastatic versus reactive etiology.  Recommend short-term follow-up to assess for stability versus evolution. Otherwise, no extrathoracic hypermetabolic tumor identified.    Interval Hx : 11/12/2020   Pt slightly nervous/anxious  No new issues  She continues with mild LOGAN  No CP   PET/CT 10/23/2020 shows Stable mildly hypermetabolic right axillary lymph node/nodular density contralateral to the site of injection.  Differential considerations include metastatic lymph node, reactive lymph node from benign right upper extremity process, and fat necrosis. Otherwise, no other hypermetabolic disease identified    Mammo diagnostic right /US 11/4/2020   Impression:   1. No suspicious finding either on mammogram or ultrasound at the site of the palpable lump in the right breast at 10:00 o'clock. A bilateral mammogram is recommended in June 2020 to return to the patient to annual screening.   2. Redemonstration of the morphologically abnormal lymph node in the right axilla that contains a biopsy clip, compatible with the known recurrence metastatic breast cancer that has been treated with chemotherapy. The appearance of this lymph node is unchanged from 06/26/2020 and overall appears smaller when compared to 03/11/2019.           PrevHx:She had an abnormal mammogram 6/4/2014 which  Revealed a round solid mass 6mm in rt breast.  She then underwent U/S guided core bx of rt breast mass on 6/17/2014.  Pathology revealed infiltrating ductal carcinoma Grade 3 with tumor  Present in thin-walled spaces suggestive of lymphatic spaces. Hormone  Receptor status on tumor specimen revealed ER negative 0% ,  NC negative 0% and Her 2 jose maria negative.  She subsequently underwent rt segmental mastectomy and SLN bx  On 7/11/2014. Pathology of rt breast  "lumpectomy revealed invasive  Ductal carcinoma with micropapillary pattern ( Invasive micropapillary  Ca) with max tumor dimension 11.5mm with suggestion of tumor in   Thin walled spaces c/w lymphovascular involvement. Surgical  Margins free of tumor, grade 3, ER/NV neg , Her 2 jose maria neg   With [place] Lymph node negative for Neoplasm.   Pathologic staging [id number](i-)  Her mother was diagnosed with breast cancer in her 50's/  She has no family history of ovarian cancer.           Review of Systems   Constitutional: Negative for appetite change, fever and unexpected weight change.   HENT: Negative for mouth sores and rhinorrhea.    Eyes: Negative for visual disturbance.   Respiratory: Positive for cough and shortness of breath (improved).    Cardiovascular: Negative for chest pain.   Gastrointestinal: Negative for abdominal pain and diarrhea.   Genitourinary: Negative for frequency.   Musculoskeletal: Positive for arthralgias. Negative for back pain.   Skin: Negative for rash.   Neurological: Negative for dizziness and headaches.   Hematological: Negative for adenopathy.   Psychiatric/Behavioral: The patient is not nervous/anxious.        Objective:          Vitals:    11/12/20 1030   BP: 115/80   BP Location: Left arm   Patient Position: Sitting   BP Method: Large (Automatic)   Pulse: (!) 57   Temp: 98.3 °F (36.8 °C)   TempSrc: Oral   SpO2: 95%   Weight: 79.7 kg (175 lb 11.3 oz)   Height: 5' 3" (1.6 m)   '    Physical Exam   Constitutional: She is oriented to person, place, and time. She appears well-developed and well-nourished.   HENT:   Head: Normocephalic.   Mouth/Throat: Oropharynx is clear and moist. No oropharyngeal exudate.   Eyes: Conjunctivae and lids are normal. Pupils are equal, round, and reactive to light. No scleral icterus.   Neck: Normal range of motion. Neck supple. No thyromegaly present.   Cardiovascular: Normal rate, regular rhythm and normal heart sounds.    No murmur heard.  Pulmonary/Chest: " Breath sounds normal. She has no wheezes. She has no rales.   Abdominal: Soft. Bowel sounds are normal. She exhibits no distension and no mass. There is no hepatosplenomegaly. There is no tenderness. There is no rebound and no guarding.   Musculoskeletal: Normal range of motion. She exhibits no edema and no tenderness.   Left breast- no masses or axillary LAD noted  Right breast- firmness 11-12oc, no skin changes, no axillary lad   Lymphadenopathy:     She has no cervical adenopathy.     She has no axillary adenopathy.        Right: No supraclavicular adenopathy present.        Left: No supraclavicular adenopathy present.   Neurological: She is alert and oriented to person, place, and time. No cranial nerve deficit. Coordination normal.   Skin: Skin is warm and dry. No ecchymosis, no petechiae and no rash noted. No erythema.   Psychiatric: She has a normal mood and affect.             CT a/p w/contrast 11/29/2018   1. No acute abnormality identified within the abdomen and pelvis.    2.  There are a few nonspecific prominent lymph nodes in the upper abdomen including a periesophageal lymph node which measures 1.0 cm in short axis diameter.    3.  Significant abdominal aortic atherosclerosis and abdominal aorta ectasia.    4.  Additional findings as above.    PET/CT 1/26/2018   1.  Intense FDG uptake within the known right infrahilar lung mass, compatible with malignancy.  It is unclear if this represents primary lung malignancy or metastatic breast cancer.    2.  Intensely hypermetabolic right axillary, retropectoral, and lower right cervical lymph nodes, compatible with metastases.    3.  Abnormal FDG uptake along right breast skin thickening, new from prior chest CTA and mammogram.  This may relate to localized edema/inflammation, though correlation with physical exam and mammography are recommended to exclude underlying inflammatory carcinoma.      MRI Brain w w/out contrast 2/9/2018  1.  No evidence of  intracranial metastases.  2.  Sinus disease       Rt axillary LN bx at outside facility- on 1/16/2018 at [facility]  Pathology revealed metastatic poorly differentiated carcinoma of unknown primary  site 1/16/2018 at [facility]  TTF-1 negative, napsin negative  , cytokeratin 7 positive, cytokeratin 20 negative, p63 negative, cytokeratin 5/6 focal dim positivity    LUNG BIOPSY 1/31/2018  Pathology revealed benign lung tissue         SPECIMEN  1) Right Lung Bx 2/14/2018  Supplemental Diagnosis  Immunohistochemical stains show strong nuclear staining for p63 in essentially all tumor cells and very strong  cytoplasmic and membrane staining for CK5/6, also in essentially all tumor cells. TTF-1 and CK7 are negative  within tumor cells but do stain native pulmonary elements present within the biopsy. A stain for mucicarmine is  negative. Positive and negative controls function appropriately.  Final diagnosis: Specimen submitted as right lung biopsy  -Squamous cell carcinoma  (Electronically Signed: 2018-02-20 09:12:07 )  Diagnosed by: Phong Thompson  FINAL PATHOLOGIC DIAGNOSIS  Fragments of pulmonary parenchyma (submitted as right lung biopsy):    FINAL PATHOLOGIC DIAGNOSIS 1. Lymph node, right axilla, biopsy, review of 10 outside slides [facility], JS 18 1 [id number],  collected January 11, 2018: Metastatic poorly differentiated carcinoma (see comment).  The histologic section shows fibrous stroma infiltrated by nest of poorly differentiated malignant cells including  occasional dyskeratotic cells morphologically suggestive of metastatic squamous cell carcinoma.  Immunohistochemical stains are nonspecific; the cells are positive for cytokeratin 7 and cytokeratin 5/6 (focal) and  negative for cytokeratin 20, TTF-1, p63, GCDFP, mammoglobin, and CEA        PET/CT 2/21/2019  Increased size and irregularity of the right axillary lymph node with increased FDG avidity.  Although this would  be atypical in location for lung carcinoma, the increased size and avidity must be concerning for metastatic disease in this patient with history of squamous cell lung cancer.     US Rt breast and mammo 2/26/2019  Impression:  Right  Lymph Node: Right axilla lymph node. Assessment: 4 - Suspicious finding. Biopsy is recommended.      BI-RADS Category:   Right: 4 - Suspicious  Overall: 4 - Suspicious     Recommendation:  Biopsy is recommended. Biopsy of the lymph node measuring 1.4 x 1.1 x 1.3 recommended , the one with the round shape configuration, corresponding to the most recent PET CT finding.         Pathology 3/11/2019   FINAL PATHOLOGIC DIAGNOSIS  Right axilla, mass, core biopsy:  Positive for poorly differentiated carcinoma.  See comment.  Comment:  The biopsy from the right axilla mass shows a poorly differentiated carcinoma in background lymphoid tissue  with enlarged cells showing increased nuclear size, prominent nucleoli, moderate amounts of cytoplasm and mitotic  figures. Immunostains are completed and reveal the tumor cells to stain positively with cytokeratin AE 1/AE3, CK  5/6 and CK 7. The tumor cells are negative for CK 20, Estrogen receptor, p63 and TTF-1. All stains have  satisfactory positive and negative controls. The patient's prior cases will be reviewed and an additional stain for  JUAREZ-3 is pending in an attempt to pinpoint the primary site of this malignancy and results will follow in a  supplemental report.      Supplemental Diagnosis  3/11/2019  The current axillary mass is compared to the patient's prior right lung biopsy (case number [id number]) and the  current axillary mass is morphologically similar to the lung malignancy. Also, the current axillary mass is compared  to the patient's prior breast resection (Case number [id number]) and appears similar as well. P63 is negative in the  [id number] tumor and CK 5/6 shows scattered positive staining in the [id number] tumor.  Immunostain for  JUAREZ-3 is completed and shows only rare weak to moderate staining within the tumor cell nuclei  with satisfactory positive and negative controls. This stain is positive in the surrounding lymphocytes. This staining  pattern is non-specific and does not definitively differentiate between a lung and breast primary malignancy in this  right axilla mass biopsy. The staining profile of the current axillary mass match more closely with the previous  breast carcinoma (due to the p63 negativity in both the 2014 breast cancer and the current axillary mass lesion but  p63 positivity in the lung mass from 2018).  This staining profile, together with the comparison with the patient's prior breast tumor and prior lung tumor supports  a diagnosis of poorly differentiated carcinoma and the malignancy appears morphologically similar to the prior  malignancies from both sites, but the staining profile in this small sample from the axillary mass more closely  correlates with the prior breast malignancy. Pathologic subclassification of this malignancy's primary location is not  definitive and clinical correlation is recommended definitively decide on possible primary location in this patient's  axillary mass sample.  Supplemental (2):  Additional immunohistochemical staining for progesterone receptor and HER2 are completed per clinician request  with following results:  Progesterone receptor: Negative; 0% nuclear tumor cell staining.  HER2: Negative; stain score = 0.  Supplemental (3):  Kathryn DIAGNOSIS:  FINAL DIAGNOSIS  Breast, right, needle core biopsy ([id number]; 06/17/2014): Invasive ductal carcinoma, [place] grade III (of  III), with focal micropapillary features.  Immunohistochemical stains performed at the referring institution show that the tumor cells have the following  phenotype: - Estrogen receptor: Negative (0% tumor cells staining). - Progesterone receptor: Negative (0% tumor  cells staining). - HER2: Negative (score  0).  Lymph nodes, right, sentinel biopsy and lumpectomy ([id number]; 07/11/2014):  1. Right sentinel lymph node: A single (1) lymph node is negative for metastatic carcinoma.  Immunohistochemical stains performed by the referring institution and reviewed at [facility] for cytokeratin are  negative, confirming the diagnosis.  2. Right breast: Invasive ductal carcinoma with micropapillary features, per report 11.5 mm in greatest dimension.  Foci suspicious for lymphovascular invasion identified. Margins of resection are free of tumor.  Immunohistochemical stains performed by the referring institution and reviewed at [facility] show that the tumor  cells are negative for p63 and show patchy positivity for CK 5/6.  Lung, right, needle core biopsy ([id number]; 02/14/2018): Squamous cell carcinoma.    Immunohistochemical stains performed by the referring institution show the tumor cells are strongly positive for p63  and CK 5/6, while negative for TTF-1 and CK7. These result support the diagnosis. Axilla, right, needle core biopsy  ([id number]; 03/11/2019): Lymph node positive for metastatic carcinoma, most consistent with breast primary  (see comment).  Immunohistochemical stains performed by the referring institution and reviewed at [facility] show that the tumor  cells are negative for p63, focally positive for CK 5/6, focally and weakly positive for JUAREZ-3, and strongly positive  for CK7. They are also negative for estrogen receptor, progesterone receptor, and HER2 JAM (score 0).  COMMENT:  Thank you for allowing me to review the case of this 73-year-old lady who recently underwent needle core biopsies  of a right axillary mass and who has a previous diagnosis of an invasive ductal carcinoma of the right breast, as well  as a squamous cell carcinoma of the lung. I am reviewing this case because of my special interest in breast  pathology.  I agree with your interpretation of this case. In my opinion, the lung tumor  corresponds to a squamous cell  carcinoma and appears to be unrelated to the invasive ductal carcinoma of the breast. The axillary mass, in my  opinion, corresponds to metastases of the breast primary. Although it is a poorly differentiated carcinoma, the  immunophenotype is most consistent with the one that the breast primary exhibited, and is inconsistent with a  metastatic squamous cell carcinoma. I did share the case with Dr. Anabel Stone, one of our pulmonary pathologists,  and she concurs with this interpretation.  Note: Report attached.  Performing location:  [address] [address]    CT neck/chest/abd/pelvis w/contrast 4/26/2019   The previously described right hilar mass is no longer visible.  There is persistent volume loss in the right middle lobe this appears similar to the prior PET-CT February 21, 2019.    Persistent abnormal right axillary node today measuring about 15 mm compared 18 mm prior.  The positioning of the adjacent nodes is slightly different likely accounting for the slight difference in measurement.    Cluster of tree-in-bud nodular densities left lower lobe series 2, image 93.  This may be related to small airways disease though serial follow-up suggested.      PET/CT 6/20/2019   New and worsening hypermetabolic lymph nodes concerning for metastatic breast cancer as described above.  Tissue sampling would be required for definitive diagnosis.      2-D echo 6/27/2019   · Normal left ventricular systolic function. The estimated ejection fraction is 55%  · Concentric left ventricular remodeling.  · Normal LV diastolic function.  · Normal right ventricular systolic function.  · Moderate left atrial enlargement.  · Mild right atrial enlargement.  · Mild aortic regurgitation.  · Mild mitral regurgitation.  · Mild tricuspid regurgitation.  · Normal central venous pressure (3 mm Hg).  · The estimated PA systolic pressure is 25 mm  Hg      PET/CT 9/19/2019   Favorable response to therapy with interval decrease in size and uptake of several right axillary lymph nodes and a supraclavicular lymph node.  Mild residual activity may indicate viable tumor.    No new hypermetabolic findings worrisome for malignancy.    PET/CT 2/28/2020  1.  No evidence of hypermetabolic tumor.  Continued improvement of the right axilla status post adjuvant radiation.    2.  No new hypermetabolic lesions      CTA 6/17/2020  1. No evidence of pulmonary embolus. No aortic dissection.   2. There is no evidence for new or progressive disease in the chest as compared to PET/CT 6/20/2019 in this patient with history of right breast cancer and right lung neoplasm. The patient does have a more recent PET/CT 2/28/2020 from an outside facility per the electronic medical record although images are not available for direct comparison. Correlation with these images may be beneficial. Continued long-term surveillance recommended.  3. Stable appearance of the treated tumor in the right hilum/middle lobe.  4. Stable treated right breast cancer and axillary adenopathy without evidence for developing breast mass or new right axillary adenopathy.  5. Stable tiny nodularity/tree-in-bud densities in the left lung, probably postinfectious or inflammatory.  6. Wall thickening of the esophagus may be correlated for esophagitis. Possible tiny hiatus hernia.  7. Slight bronchial wall thickening may be correlated for bronchitis.  8. Mild to moderate emphysema as before.  9. Reflux of contrast into the IVC and hepatic veins is nonspecific but may be correlated for elevated right heart pressures.  10. Coronary calcifications and/or stents similar to prior.    Echo 5/21/2020  Technically difficult study.   Normal left ventricular systolic function.   Left ventricular ejection fraction is estimated at 55%.   Severe mitral annular calcification.   Mild mitral valve regurgitation.   Aortic valve  sclerosis without stenosis or regurgitation.     PFTs 6/17/2020  Spirometry reveals a very severe obstructive lung defect, which does not significantly improve post bronchodilators. Lung volumes revealed air trapping. Diffusing capacity was moderately impaired.        Del 6/26/2020  BI-RADS Category:   Overall: 2 - Benign      PET/CT 10/23/2020   Impression:     Stable mildly hypermetabolic right axillary lymph node/nodular density contralateral to the site of injection.  Differential considerations include metastatic lymph node, reactive lymph node from benign right upper extremity process, and fat necrosis.  Recommend physical examination of the upper extremity and consideration of ultrasound for further evaluation of the node/nodule and possible image guided biopsy.  Otherwise, attention on follow-up.     Otherwise, no other hypermetabolic disease identified      Mammo diagnostic right /US 11/4/2020   Impression:   1. No suspicious finding either on mammogram or ultrasound at the site of the palpable lump in the right breast at 10:00 o'clock. A bilateral mammogram is recommended in June 2020 to return to the patient to annual screening.   2. Redemonstration of the morphologically abnormal lymph node in the right axilla that contains a biopsy clip, compatible with the known recurrence metastatic breast cancer that has been treated with chemotherapy. The appearance of this lymph node is unchanged from 06/26/2020 and overall appears smaller when compared to 03/11/2019.       Assessment:       1. Recurrent breast cancer, unspecified laterality    2.  History of Squamous cell carcinoma of right lung    3. Chronic obstructive pulmonary disease, unspecified COPD type    4. LOGAN (dyspnea on exertion)        Plan:     1-4    Pt with hx of Stage 1A invasive ductal carcinoma rt breast s/p rt segmental mastectomy and SLN bx 7/11/2014 ER/NH neg HER 2 jose maria neg [id number](i-).  S/p adjuvant RT completed 9/2014 Pt declined adjuvant  chemo  Follow-up Mammo 6/12/2017 No mammographic evidence of malignancy.  Pt  hospitalized 11/2017 with acute-on-chronic  resp failure  Abnormal CT imaging revealing Large right hilar mass with involvement of  adjacent segmental pulmonary arterial branches and bronchi with associated postobstructive atelectasis  and involvement of mediastinal and axillary adenopathy   S/p rt axillary LN bx ( outside facility) - metastatic poorly differentiated carcinoma of unknown primary  site  status post  lung biopsy 1/31/2017 -benign lung tissue  PET/CT 1/26/2018   Intense FDG uptake within the known right infrahilar lung mass, compatible with malignancy.   Intensely hypermetabolic right axillary, retropectoral, and lower right cervical lymph nodes, compatible with metastases.  Abnormal FDG uptake along right breast skin thickening, new from prior chest CTA and mammogram.    Repeat lung bx 2/14/2018 revealed Squamous Cell CA lung  PD-L1 10% low expression  EGFR NEG  ALK NEG    Pt treated for advanced squamous cell CA of lung   She s/p  cycle 6 of carboplatin/Abraxane Day1 completed 7/2/2018 ( day 15 held due to prolonged cytopenias)  She completed therapy with near CR     PET/CT 2/21/2019 showed increased size and irregularity of the right axillary lymph node with increased FDG avidity    t MAMMO/US rt breast 3/2019  reveals suspicious findings rt axilla LN.  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 It was determined The axillary mass, in my opinion, corresponded  to metastases of the breast primary. Although it is a poorly differentiated  carcinoma, theimmunophenotype is most consistent with the one that the breast primary exhibited, and is inconsistent with a metastatic squamous cell carcinoma.     CT imaging 4/26/2019 persistent rt axillary LAD, no other sites of disease     Pf followed by Rad/Onc regarding RT     Lymph node biopsy 3/11/2019 Right axilla, mass, core biopsy:  Positive for poorly differentiated carcinoma.   favor breast primary   Pt was discussed at multidisciplinary tumor board  D/w Pathologist    PET/CT 6/20/2019  New and worsening hypermetabolic lymph nodes concerning for metastatic breast cancer     Previously Discussed  imaging findings in detail with patient which reveals new and worsening hypermetabolic melena metabolic lymph nodes including hypermetabolic supraclavicular node.  Therefore, at treatment option will be systemic chemotherapy in light of the recent imaging findings.  She will be followed by her surgical oncologist Dr. Christopher      IT was determined to proceed with systemic chemotherapy   S/p  AC [id number] x 3 wks x 4 wks completed 9/6/2019   ( pt has not received in past)      PET/CT 9/19/2019 shows disease response with interval decrease in size and uptake of several right axillary lymph nodes and a supraclavicular lymph node.  Mild residual activity may indicate viable tumor.  No new hypermetabolic findings worrisome for malignancy.    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   She completed chemotherapy 9/6/2019  and has had a near complete response.  It was determined  Radiation will be given to the original areas of hypermetabolic activity in the right axilla and right supraclavicular region  Pt was presented at multidisciplinary tumor board    Pt completed  RT 12/16/2019       PET/CT 10/23/2020 shows Stable mildly hypermetabolic right axillary lymph node/nodular density contralateral to the site of injection.   Differential considerations include metastatic lymph node, reactive lymph node from benign right upper extremity process, and fat necrosis. Otherwise, no other hypermetabolic disease identified    Mammo diagnostic right /US 11/4/2020   Impression:   1. No suspicious finding either on mammogram or ultrasound at the site of the palpable lump in the right breast at 10:00 o'clock. A bilateral mammogram is recommended in June 2020 to return to the patient to annual screening.   2. Redemonstration of the morphologically abnormal lymph node in the right axilla that contains a biopsy clip, compatible with the known recurrence metastatic breast cancer that has been treated with chemotherapy. The appearance of this lymph node is unchanged from 06/26/2020 and overall appears smaller when compared to 03/11/2019.     Discussed imaging findings in detail. No barry evidence of recurrence/progression. LN rt axilla essentially unchanged  Cont to monitor    Follow-up 6 weeks with cbc,cmp prior to f/u     Advance Care Planning     Power of   I previously  initiated the process of advance care planning today and explained the importance of this process to the patient.  I introduced the concept of advance directives to the patient, as well. Then the patient received detailed information about the importance of designating a Health Care Power of  (HCPOA). She was also instructed to communicate with this person about their wishes for future healthcare, should she become sick and lose decision-making capacity. The patient has previously appointed a HCPOA. After our discussion, the patient has decided to complete a HCPOA and has appointed her son and niece and  I spent a total time of 16 minutes discussing this issue with the patient.         Cc: Swetha Katz M.D.         MD Tory Roberson MD Raymond Gould, MD

## 2020-12-07 ENCOUNTER — OFFICE VISIT (OUTPATIENT)
Dept: FAMILY MEDICINE | Facility: CLINIC | Age: 75
End: 2020-12-07
Payer: MEDICARE

## 2020-12-07 VITALS
HEIGHT: 63 IN | OXYGEN SATURATION: 95 % | SYSTOLIC BLOOD PRESSURE: 120 MMHG | DIASTOLIC BLOOD PRESSURE: 76 MMHG | TEMPERATURE: 98 F | BODY MASS INDEX: 30.86 KG/M2 | HEART RATE: 56 BPM | WEIGHT: 174.19 LBS

## 2020-12-07 DIAGNOSIS — R10.11 RUQ ABDOMINAL PAIN: Primary | ICD-10-CM

## 2020-12-07 PROCEDURE — 3008F BODY MASS INDEX DOCD: CPT | Mod: HCNC,CPTII,S$GLB, | Performed by: FAMILY MEDICINE

## 2020-12-07 PROCEDURE — 99999 PR PBB SHADOW E&M-EST. PATIENT-LVL III: CPT | Mod: PBBFAC,HCNC,, | Performed by: FAMILY MEDICINE

## 2020-12-07 PROCEDURE — 1159F MED LIST DOCD IN RCRD: CPT | Mod: HCNC,S$GLB,, | Performed by: FAMILY MEDICINE

## 2020-12-07 PROCEDURE — 3288F PR FALLS RISK ASSESSMENT DOCUMENTED: ICD-10-PCS | Mod: HCNC,CPTII,S$GLB, | Performed by: FAMILY MEDICINE

## 2020-12-07 PROCEDURE — 99214 PR OFFICE/OUTPT VISIT, EST, LEVL IV, 30-39 MIN: ICD-10-PCS | Mod: HCNC,S$GLB,, | Performed by: FAMILY MEDICINE

## 2020-12-07 PROCEDURE — 99214 OFFICE O/P EST MOD 30 MIN: CPT | Mod: HCNC,S$GLB,, | Performed by: FAMILY MEDICINE

## 2020-12-07 PROCEDURE — 3078F DIAST BP <80 MM HG: CPT | Mod: HCNC,CPTII,S$GLB, | Performed by: FAMILY MEDICINE

## 2020-12-07 PROCEDURE — 1157F PR ADVANCE CARE PLAN OR EQUIV PRESENT IN MEDICAL RECORD: ICD-10-PCS | Mod: HCNC,S$GLB,, | Performed by: FAMILY MEDICINE

## 2020-12-07 PROCEDURE — 1101F PR PT FALLS ASSESS DOC 0-1 FALLS W/OUT INJ PAST YR: ICD-10-PCS | Mod: HCNC,CPTII,S$GLB, | Performed by: FAMILY MEDICINE

## 2020-12-07 PROCEDURE — 99999 PR PBB SHADOW E&M-EST. PATIENT-LVL III: ICD-10-PCS | Mod: PBBFAC,HCNC,, | Performed by: FAMILY MEDICINE

## 2020-12-07 PROCEDURE — 1157F ADVNC CARE PLAN IN RCRD: CPT | Mod: HCNC,S$GLB,, | Performed by: FAMILY MEDICINE

## 2020-12-07 PROCEDURE — 3074F PR MOST RECENT SYSTOLIC BLOOD PRESSURE < 130 MM HG: ICD-10-PCS | Mod: HCNC,CPTII,S$GLB, | Performed by: FAMILY MEDICINE

## 2020-12-07 PROCEDURE — 1101F PT FALLS ASSESS-DOCD LE1/YR: CPT | Mod: HCNC,CPTII,S$GLB, | Performed by: FAMILY MEDICINE

## 2020-12-07 PROCEDURE — 3288F FALL RISK ASSESSMENT DOCD: CPT | Mod: HCNC,CPTII,S$GLB, | Performed by: FAMILY MEDICINE

## 2020-12-07 PROCEDURE — 1159F PR MEDICATION LIST DOCUMENTED IN MEDICAL RECORD: ICD-10-PCS | Mod: HCNC,S$GLB,, | Performed by: FAMILY MEDICINE

## 2020-12-07 PROCEDURE — 3074F SYST BP LT 130 MM HG: CPT | Mod: HCNC,CPTII,S$GLB, | Performed by: FAMILY MEDICINE

## 2020-12-07 PROCEDURE — 3078F PR MOST RECENT DIASTOLIC BLOOD PRESSURE < 80 MM HG: ICD-10-PCS | Mod: HCNC,CPTII,S$GLB, | Performed by: FAMILY MEDICINE

## 2020-12-07 PROCEDURE — 3008F PR BODY MASS INDEX (BMI) DOCUMENTED: ICD-10-PCS | Mod: HCNC,CPTII,S$GLB, | Performed by: FAMILY MEDICINE

## 2020-12-07 RX ORDER — CIPROFLOXACIN 500 MG/1
TABLET ORAL
COMMUNITY
Start: 2020-11-28 | End: 2020-12-07 | Stop reason: ALTCHOICE

## 2020-12-07 RX ORDER — ACETAMINOPHEN AND CODEINE PHOSPHATE 300; 30 MG/1; MG/1
TABLET ORAL
COMMUNITY
Start: 2020-11-28 | End: 2020-12-07

## 2020-12-07 NOTE — PROGRESS NOTES
Subjective:       Patient ID: Ade Castellano is a 75 y.o. female.    Chief Complaint: ER Follow Up/ [facility] and Discuss gallbladder concerns    75 year old with vomitng and diarrhea with RUQ pain and right shoulder pain. She states the vomiting resplved after 2-3 days. She states the diarrhea persisted so she went to the ED. She admits to RUQ distention when she eats. She is concerned as her sister had gallbladder cancer. She states the vomiting and diarrhea have resolved. She still has some RUQ abdominal discomfort.       Past Medical History:  8/5/2020: Abnormal stress test  11/29/2017: Acute respiratory failure with hypoxia and hypercapnia  No date: Angina pectoris  No date: Arthritis  No date: Bell's palsy      Comment:  left facial weakness  No date: Breast cancer      Comment:  RIGHT  No date: CAD (coronary artery disease)  No date: Cervical cancer  No date: Chronic bronchitis  8/5/2020: Chronic heart failure with preserved ejection fraction  8/5/2020: Chronic heart failure with preserved ejection fraction  No date: COPD (chronic obstructive pulmonary disease)      Comment:  Dr. Katz  No date: Dental bridge present  No date: Emphysema of lung  06/2017: H/O colonoscopy      Comment:  due for repeat colonsocopy in 6/2018  No date: History of Bell's palsy  No date: History of heart artery stent      Comment:  Dr. Otriz  x2 stents  No date: Hyperlipidemia  No date: Hypertension  No date: Lung cancer  No date: Myocardial infarction  No date: SUNITHA (obstructive sleep apnea)      Comment:  intolerant to mask  No date: SUNITHA (obstructive sleep apnea)      Comment:  intolerant to mask   No date: Pneumonia  No date: Pneumonia due to other staphylococcus  No date: PUD (peptic ulcer disease)  6/11/2018: Severe anemia  No date: Sleep apnea  No date: Vaginal delivery      Comment:  x1   Past Surgical History:  No date: ADENOIDECTOMY  No date: BREAST BIOPSY; Right      Comment:  x3  No date: BREAST  LUMPECTOMY  No date: BREAST SURGERY      Comment:  lumpectomy right side   No date: CERVIX SURGERY      Comment:  cone  3/17/2017: COLONOSCOPY; N/A      Comment:  Procedure: COLONOSCOPY;  Surgeon: Julio Rudd MD;                 Location: [facility] ENDO;  Service: Endoscopy;  Laterality:                N/A;  6/30/2017: COLONOSCOPY; N/A      Comment:  Procedure: COLONOSCOPY;  Surgeon: Julio Rudd MD;                 Location: [facility] ENDO;  Service: Endoscopy;  Laterality:                N/A;  11/28/2018: COLONOSCOPY; N/A      Comment:  Procedure: COLONOSCOPY;  Surgeon: Nura Urbina MD;                 Location: [facility] ENDO;  Service: Endoscopy;  Laterality:                N/A;  1/29/2019: COLONOSCOPY; N/A      Comment:  Procedure: COLONOSCOPY;  Surgeon: Emmanuel Perez MD;                Location: [facility] ENDO;  Service: Endoscopy;  Laterality:                N/A;  confirmed appt-SP  06/08/2018: EYE SURGERY; Bilateral      Comment:  cataract   8/13/2020: LEFT HEART CATHETERIZATION; Left      Comment:  Procedure: Left heart cath, rra, noon;  Surgeon: Guevara Skelton MD;  Location: [facility] CATH LAB;  Service:                Cardiology;  Laterality: Left;  RN PREOP 8/7/2020---COVID               NEGATIVE ON 8/12 01/2018: PORTACATH PLACEMENT; Right  No date: sweat glands axillary regions; Bilateral  No date: TONSILLECTOMY  Review of patient's family history indicates:  Problem: Cancer      Relation: Mother          Age of Onset: (Not Specified)          Comment: breast  Problem: Heart disease      Relation: Mother          Age of Onset: (Not Specified)  Problem: Breast cancer      Relation: Mother          Age of Onset: (Not Specified)  Problem: Cancer      Relation: Father          Age of Onset: (Not Specified)          Comment: lung-smoker   Problem: Cancer      Relation: Sister          Age of Onset: (Not Specified)          Comment: lung-smoker   Problem: Heart attack      Relation: Sister          Age of  Onset: (Not Specified)  Problem: Cancer      Relation: Maternal Grandmother          Age of Onset: (Not Specified)  Problem: Heart disease      Relation: Maternal Grandmother          Age of Onset: (Not Specified)  Problem: Cancer      Relation: Maternal Grandfather          Age of Onset: (Not Specified)  Problem: Heart disease      Relation: Maternal Grandfather          Age of Onset: (Not Specified)  Problem: Cancer      Relation: Paternal Grandmother          Age of Onset: (Not Specified)  Problem: Cancer      Relation: Paternal Grandfather          Age of Onset: (Not Specified)  Problem: Cancer      Relation: Sister          Age of Onset: (Not Specified)          Comment: mets not sure where it started   Problem: COPD      Relation: Sister          Age of Onset: (Not Specified)  Problem: Kidney cancer      Relation: Son          Age of Onset: (Not Specified)    Social History    Socioeconomic History      Marital status: Single      Spouse name: Not on file      Number of children: Not on file      Years of education: Not on file      Highest education level: Not on file    Occupational History      Not on file    Social Needs      Financial resource strain: Not on file      Food insecurity        Worry: Not on file        Inability: Not on file      Transportation needs        Medical: Not on file        Non-medical: Not on file    Tobacco Use      Smoking status: Former Smoker        Packs/day: 0.25        Years: 50.00        Pack years: 12.5        Types: Cigarettes        Quit date: 11/9/2017        Years since quitting: 3.0      Smokeless tobacco: Never Used    Substance and Sexual Activity      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       Drug use: No      Sexual activity: Never    Lifestyle      Physical activity        Days per week: Not on file        Minutes per session: Not on file      Stress: Not at all    Relationships      Social connections        Talks on phone: Not on file        Gets  "together: Not on file        Attends Sabianism service: Not on file        Active member of club or organization: Not on file        Attends meetings of clubs or organizations: Not on file        Relationship status: Not on file    Other Topics      Concerns:        Not on file    Social History Narrative      Not on file        Review of Systems      Objective:       Vitals:    12/07/20 0853   BP: 120/76   Pulse: (!) 56   Temp: 98.1 °F (36.7 °C)   TempSrc: Oral   SpO2: 95%   Weight: 79 kg (174 lb 2.6 oz)   Height: 5' 3" (1.6 m)   \  Physical Exam  Constitutional:       Appearance: Normal appearance.   HENT:      Head: Normocephalic and atraumatic.   Cardiovascular:      Rate and Rhythm: Normal rate and regular rhythm.      Pulses: Normal pulses.      Heart sounds: Normal heart sounds. No murmur. No friction rub. No gallop.    Pulmonary:      Effort: Pulmonary effort is normal. No respiratory distress.      Breath sounds: Normal breath sounds. No stridor. No wheezing, rhonchi or rales.   Chest:      Chest wall: No tenderness.   Abdominal:      General: Abdomen is flat. There is no distension.      Palpations: Abdomen is soft. There is no mass.      Tenderness: There is abdominal tenderness in the right upper quadrant. There is no guarding or rebound.      Hernia: No hernia is present.   Skin:     General: Skin is warm.   Neurological:      Mental Status: She is alert.   Psychiatric:         Thought Content: Thought content normal.         Assessment:       1. RUQ abdominal pain        Plan:       Ade was seen today for er follow up/ [facility] and discuss gallbladder concerns.    Diagnoses and all orders for this visit:    RUQ abdominal pain  -     US Abdomen Complete; Future  Ultrasound for abdominal pain.          "

## 2020-12-11 ENCOUNTER — HOSPITAL ENCOUNTER (OUTPATIENT)
Dept: RADIOLOGY | Facility: HOSPITAL | Age: 75
Discharge: HOME | End: 2020-12-11
Attending: FAMILY MEDICINE
Payer: MEDICARE

## 2020-12-11 DIAGNOSIS — R10.11 RUQ ABDOMINAL PAIN: ICD-10-CM

## 2020-12-11 PROCEDURE — 76705 ECHO EXAM OF ABDOMEN: CPT | Mod: TC,HCNC

## 2020-12-11 PROCEDURE — 76705 US ABDOMEN LIMITED: ICD-10-PCS | Mod: 26,HCNC,, | Performed by: RADIOLOGY

## 2020-12-11 PROCEDURE — 76705 ECHO EXAM OF ABDOMEN: CPT | Mod: 26,HCNC,, | Performed by: RADIOLOGY

## 2020-12-14 ENCOUNTER — TELEPHONE (OUTPATIENT)
Dept: FAMILY MEDICINE | Facility: CLINIC | Age: 75
End: 2020-12-14

## 2020-12-23 ENCOUNTER — PATIENT OUTREACH (OUTPATIENT)
Dept: ADMINISTRATIVE | Facility: OTHER | Age: 75
End: 2020-12-23

## 2020-12-23 ENCOUNTER — OFFICE VISIT (OUTPATIENT)
Dept: CARDIOLOGY | Facility: CLINIC | Age: 75
End: 2020-12-23
Payer: MEDICARE

## 2020-12-23 VITALS
SYSTOLIC BLOOD PRESSURE: 146 MMHG | DIASTOLIC BLOOD PRESSURE: 83 MMHG | BODY MASS INDEX: 30.59 KG/M2 | RESPIRATION RATE: 16 BRPM | WEIGHT: 172.63 LBS | HEIGHT: 63 IN | HEART RATE: 53 BPM | OXYGEN SATURATION: 95 %

## 2020-12-23 DIAGNOSIS — J44.9 CHRONIC OBSTRUCTIVE PULMONARY DISEASE, UNSPECIFIED COPD TYPE: ICD-10-CM

## 2020-12-23 DIAGNOSIS — C50.919 METASTATIC BREAST CANCER: ICD-10-CM

## 2020-12-23 DIAGNOSIS — C34.90 SQUAMOUS CELL CARCINOMA OF LUNG, UNSPECIFIED LATERALITY: ICD-10-CM

## 2020-12-23 DIAGNOSIS — I70.0 AORTIC ARCH ATHEROSCLEROSIS: ICD-10-CM

## 2020-12-23 DIAGNOSIS — E78.5 HYPERLIPIDEMIA LDL GOAL <70: ICD-10-CM

## 2020-12-23 DIAGNOSIS — I11.9 BENIGN HYPERTENSIVE HEART DISEASE WITHOUT HEART FAILURE: ICD-10-CM

## 2020-12-23 DIAGNOSIS — I70.0 ATHEROSCLEROSIS OF AORTA: ICD-10-CM

## 2020-12-23 DIAGNOSIS — R06.09 DOE (DYSPNEA ON EXERTION): ICD-10-CM

## 2020-12-23 DIAGNOSIS — I25.10 CORONARY ARTERY DISEASE INVOLVING NATIVE CORONARY ARTERY OF NATIVE HEART WITHOUT ANGINA PECTORIS: ICD-10-CM

## 2020-12-23 DIAGNOSIS — I50.32 CHRONIC HEART FAILURE WITH PRESERVED EJECTION FRACTION: Primary | ICD-10-CM

## 2020-12-23 PROCEDURE — 3077F PR MOST RECENT SYSTOLIC BLOOD PRESSURE >= 140 MM HG: ICD-10-PCS | Mod: HCNC,CPTII,S$GLB, | Performed by: INTERNAL MEDICINE

## 2020-12-23 PROCEDURE — 1157F PR ADVANCE CARE PLAN OR EQUIV PRESENT IN MEDICAL RECORD: ICD-10-PCS | Mod: HCNC,S$GLB,, | Performed by: INTERNAL MEDICINE

## 2020-12-23 PROCEDURE — 1101F PR PT FALLS ASSESS DOC 0-1 FALLS W/OUT INJ PAST YR: ICD-10-PCS | Mod: HCNC,CPTII,S$GLB, | Performed by: INTERNAL MEDICINE

## 2020-12-23 PROCEDURE — 1126F PR PAIN SEVERITY QUANTIFIED, NO PAIN PRESENT: ICD-10-PCS | Mod: HCNC,S$GLB,, | Performed by: INTERNAL MEDICINE

## 2020-12-23 PROCEDURE — 99999 PR PBB SHADOW E&M-EST. PATIENT-LVL IV: CPT | Mod: PBBFAC,HCNC,, | Performed by: INTERNAL MEDICINE

## 2020-12-23 PROCEDURE — 3077F SYST BP >= 140 MM HG: CPT | Mod: HCNC,CPTII,S$GLB, | Performed by: INTERNAL MEDICINE

## 2020-12-23 PROCEDURE — 3008F PR BODY MASS INDEX (BMI) DOCUMENTED: ICD-10-PCS | Mod: HCNC,CPTII,S$GLB, | Performed by: INTERNAL MEDICINE

## 2020-12-23 PROCEDURE — 99214 OFFICE O/P EST MOD 30 MIN: CPT | Mod: HCNC,S$GLB,, | Performed by: INTERNAL MEDICINE

## 2020-12-23 PROCEDURE — 1157F ADVNC CARE PLAN IN RCRD: CPT | Mod: HCNC,S$GLB,, | Performed by: INTERNAL MEDICINE

## 2020-12-23 PROCEDURE — 1159F MED LIST DOCD IN RCRD: CPT | Mod: HCNC,S$GLB,, | Performed by: INTERNAL MEDICINE

## 2020-12-23 PROCEDURE — 1101F PT FALLS ASSESS-DOCD LE1/YR: CPT | Mod: HCNC,CPTII,S$GLB, | Performed by: INTERNAL MEDICINE

## 2020-12-23 PROCEDURE — 3288F FALL RISK ASSESSMENT DOCD: CPT | Mod: HCNC,CPTII,S$GLB, | Performed by: INTERNAL MEDICINE

## 2020-12-23 PROCEDURE — 3079F DIAST BP 80-89 MM HG: CPT | Mod: HCNC,CPTII,S$GLB, | Performed by: INTERNAL MEDICINE

## 2020-12-23 PROCEDURE — 99214 PR OFFICE/OUTPT VISIT, EST, LEVL IV, 30-39 MIN: ICD-10-PCS | Mod: HCNC,S$GLB,, | Performed by: INTERNAL MEDICINE

## 2020-12-23 PROCEDURE — 3288F PR FALLS RISK ASSESSMENT DOCUMENTED: ICD-10-PCS | Mod: HCNC,CPTII,S$GLB, | Performed by: INTERNAL MEDICINE

## 2020-12-23 PROCEDURE — 1159F PR MEDICATION LIST DOCUMENTED IN MEDICAL RECORD: ICD-10-PCS | Mod: HCNC,S$GLB,, | Performed by: INTERNAL MEDICINE

## 2020-12-23 PROCEDURE — 3008F BODY MASS INDEX DOCD: CPT | Mod: HCNC,CPTII,S$GLB, | Performed by: INTERNAL MEDICINE

## 2020-12-23 PROCEDURE — 3079F PR MOST RECENT DIASTOLIC BLOOD PRESSURE 80-89 MM HG: ICD-10-PCS | Mod: HCNC,CPTII,S$GLB, | Performed by: INTERNAL MEDICINE

## 2020-12-23 PROCEDURE — 1126F AMNT PAIN NOTED NONE PRSNT: CPT | Mod: HCNC,S$GLB,, | Performed by: INTERNAL MEDICINE

## 2020-12-23 PROCEDURE — 99999 PR PBB SHADOW E&M-EST. PATIENT-LVL IV: ICD-10-PCS | Mod: PBBFAC,HCNC,, | Performed by: INTERNAL MEDICINE

## 2020-12-23 NOTE — PROGRESS NOTES
CARDIOVASCULAR CONSULTATION    REASON FOR CONSULT:   Ade Castellano is a 75 y.o. female who presents for preoperative risk assessment.      HISTORY OF PRESENT ILLNESS:     Patient is a pleasant 74-year-old lady.  Here for preoperative risk assessment prior to lymph node resection.  She has a past medical history significant for coronary artery disease status post PCI in 2006.  Since then has been angina free.  Had a stress test performed in October of 2018 which was normal.  Denies any anginal sounding chest pains, orthopnea, PND.  Does have mild dyspnea on exertion, which she states has actually been improving since her stress test in 2018.  Denies any other cardiac anginal symptoms.    Notes from July 2020:  Patient here for follow-up.  States that underwent radiation therapy for her lymph nodes.  Has been told that her cancer is gone now.  States that lately has been noticing severe dyspnea on exertion.  Can only walk 10 ft and then has to stop.  Also has dry cough all the time.  Not related to postural or activity.  Denies any chest tightness but does have severe dyspnea on exertion.  Denies any orthopnea, PND.      Notes from August 2020:  Patient here for follow-up.  Stress test was abnormal.      The study shows abnormal myocardial perfusion.    Abnormal myocardial perfusion study.    Perfusion Defect There is a mild to moderate intensity, small to moderate sized, mostly reversible with some fixed areas defect in the anteroapical wall(s).    Gated perfusion images showed an ejection fraction of 71%    There is normal wall motion at rest and post stress.    The EKG portion of this study is negative for ischemia.    The patient reported no chest pain during the stress test.    There were no arrhythmias during stress.    · The diary was not returned.  · NSR. Heart rates varied between 47 and 89 bpm with an average of 58 bpm.  · Rare PACs / PVCs     · Normal left ventricular systolic function. The  estimated ejection fraction is 60%.  · Grade II (moderate) left ventricular diastolic dysfunction consistent with pseudonormalization.  · Normal right ventricular systolic function.  · Mild to moderate left atrial enlargement.  · Trivial pericardial effusion.  · The estimated PA systolic pressure is 25 mmHg.  · Normal central venous pressure (3 mmHg).      Notes from aug 20, 2020  Patient here for follow-up.  Denies any chest pains at rest on exertion, orthopnea, PND, swelling of feet.  No complications from cardiac catheterization.        · LVEDP (Pre): 18  · Estimated blood loss: <50 mL  · Non-obstructive CAD.  · No significant coronary artery stenosis. Luminal irregularities. Previously placed RCA stent is widely patent.      Notes from December 2020 patient here for follow-up.  Has been doing fine.  Denies any chest pains at rest on exertion, orthopnea, PND, swelling of feet.  No cardiac symptomatology.    PAST MEDICAL HISTORY:     Past Medical History:   Diagnosis Date    Abnormal stress test 8/5/2020    Acute respiratory failure with hypoxia and hypercapnia 11/29/2017    Angina pectoris     Arthritis     Bell's palsy     left facial weakness    Breast cancer     RIGHT    CAD (coronary artery disease)     Cervical cancer     Chronic bronchitis     Chronic heart failure with preserved ejection fraction 8/5/2020    Chronic heart failure with preserved ejection fraction 8/5/2020    COPD (chronic obstructive pulmonary disease)     Dr. Katz    Dental bridge present     Emphysema of lung     H/O colonoscopy 06/2017    due for repeat colonsocopy in 6/2018    History of Bell's palsy     History of heart artery stent     Dr. Ortiz  x2 stents    Hyperlipidemia     Hypertension     Lung cancer     Myocardial infarction     SUNITHA (obstructive sleep apnea)     intolerant to mask    SUNITHA (obstructive sleep apnea)     intolerant to mask     Pneumonia     Pneumonia due to other staphylococcus     PUD  (peptic ulcer disease)     Severe anemia 6/11/2018    Sleep apnea     Vaginal delivery     x1       PAST SURGICAL HISTORY:     Past Surgical History:   Procedure Laterality Date    ADENOIDECTOMY      BREAST BIOPSY Right     x3    BREAST LUMPECTOMY      BREAST SURGERY      lumpectomy right side     CERVIX SURGERY      cone    COLONOSCOPY N/A 3/17/2017    Procedure: COLONOSCOPY;  Surgeon: Julio Rudd MD;  Location: [facility] ENDO;  Service: Endoscopy;  Laterality: N/A;    COLONOSCOPY N/A 6/30/2017    Procedure: COLONOSCOPY;  Surgeon: Julio Rudd MD;  Location: [facility] ENDO;  Service: Endoscopy;  Laterality: N/A;    COLONOSCOPY N/A 11/28/2018    Procedure: COLONOSCOPY;  Surgeon: Nura Urbina MD;  Location: [facility] ENDO;  Service: Endoscopy;  Laterality: N/A;    COLONOSCOPY N/A 1/29/2019    Procedure: COLONOSCOPY;  Surgeon: Emmanuel Perez MD;  Location: [facility] ENDO;  Service: Endoscopy;  Laterality: N/A;  confirmed appt-SP    EYE SURGERY Bilateral 06/08/2018    cataract     LEFT HEART CATHETERIZATION Left 8/13/2020    Procedure: Left heart cath, rra, noon;  Surgeon: Guevara Skelton MD;  Location: [facility] CATH LAB;  Service: Cardiology;  Laterality: Left;  RN PREOP 8/7/2020---COVID NEGATIVE ON 8/12    PORTACATH PLACEMENT Right 01/2018    sweat glands axillary regions Bilateral     TONSILLECTOMY         ALLERGIES AND MEDICATION:   Review of patient's allergies indicates:  No Known Allergies     Medication List          Accurate as of December 23, 2020  8:59 AM. If you have any questions, ask your nurse or doctor.            CONTINUE taking these medications    * albuterol 90 mcg/actuation inhaler  Commonly known as: VENTOLIN HFA  INHALE 2 PUFF(S) BY MOUTH EVERY 4 - 6 HOURS AS NEEDED FOR SHORTNESS OF BREATH or WHEEZING     * albuterol 2.5 mg /3 mL (0.083 %) nebulizer solution  Commonly known as: PROVENTIL  NEBULIZE 1 vial EVERY 6 HOURS AS NEEDED FOR WHEEZING     aspirin 81 MG EC tablet  Commonly known as:  ECOTRIN     co-enzyme Q-10 30 mg capsule     fluticasone propionate 50 mcg/actuation nasal spray  Commonly known as: FLONASE  USE TWO SPRAYS IN EACH NOSTRIL ONCE A DAY     isosorbide mononitrate 30 MG 24 hr tablet  Commonly known as: IMDUR  Take 1 tablet (30 mg total) by mouth once daily.     meclizine 25 mg tablet  Commonly known as: ANTIVERT  CHEW and SWALLOW 1 TABLET(S) BY MOUTH EVERY 6 HOURS AS NEEDED FOR DIZZINESS     metoprolol tartrate 25 MG tablet  Commonly known as: LOPRESSOR  TAKE 1 TABLET(S) BY MOUTH TWICE DAILY FOR BLOOD PRESSURE     montelukast 10 mg tablet  Commonly known as: SINGULAIR  TAKE 1 TABLET(S) BY MOUTH EVERY EVENING     nitroGLYCERIN 0.4 MG SL tablet  Commonly known as: NITROSTAT  place 1 tablet under the tongue AS NEEDED. no more than 3 in 15 minutes     omeprazole 10 MG capsule  Commonly known as: PRILOSEC     PAZEO 0.7 % Drop  Generic drug: olopatadine     rosuvastatin 10 MG tablet  Commonly known as: CRESTOR  Take 1 tablet (10 mg total) by mouth once daily.     TRELEGY ELLIPTA 100-62.5-25 mcg Dsdv  Generic drug: fluticasone-umeclidin-vilanter         * This list has 2 medication(s) that are the same as other medications prescribed for you. Read the directions carefully, and ask your doctor or other care provider to review them with you.                SOCIAL HISTORY:     Social History     Socioeconomic History    Marital status: Single     Spouse name: Not on file    Number of children: Not on file    Years of education: Not on file    Highest education level: Not on file   Occupational History    Not on file   Social Needs    Financial resource strain: Not on file    Food insecurity     Worry: Not on file     Inability: Not on file    Transportation needs     Medical: Not on file     Non-medical: Not on file   Tobacco Use    Smoking status: Former Smoker     Packs/day: 0.25     Years: 50.00     Pack years: 12.50     Types: Cigarettes     Quit date: 11/9/2017     Years since  quitting: 3.1    Smokeless tobacco: Never Used   Substance and Sexual Activity    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     Drug use: No    Sexual activity: Never   Lifestyle    Physical activity     Days per week: Not on file     Minutes per session: Not on file    Stress: Not at all   Relationships    Social connections     Talks on phone: Not on file     Gets together: Not on file     Attends Rastafarian service: Not on file     Active member of club or organization: Not on file     Attends meetings of clubs or organizations: Not on file     Relationship status: Not on file   Other Topics Concern    Not on file   Social History Narrative    Not on file       FAMILY HISTORY:     Family History   Problem Relation Age of Onset    Cancer Mother         breast    Heart disease Mother     Breast cancer Mother     Cancer Father         lung-smoker     Cancer Sister         lung-smoker     Heart attack Sister     Cancer Maternal Grandmother     Heart disease Maternal Grandmother     Cancer Maternal Grandfather     Heart disease Maternal Grandfather     Cancer Paternal Grandmother     Cancer Paternal Grandfather     Cancer Sister         mets not sure where it started     COPD Sister     Kidney cancer Son        REVIEW OF SYSTEMS:   Review of Systems   Constitution: Positive for malaise/fatigue.   HENT: Negative.    Eyes: Negative.    Cardiovascular: Positive for dyspnea on exertion.   Respiratory: Negative.    Endocrine: Negative.    Hematologic/Lymphatic: Negative.    Skin: Negative.    Musculoskeletal: Negative.    Gastrointestinal: Negative.    Genitourinary: Negative.    Neurological: Negative.    Psychiatric/Behavioral: Negative.    Allergic/Immunologic: Negative.        A 10 point review of systems was performed and all the pertinent positives have been mentioned. Rest of review of systems was negative.        PHYSICAL EXAM:     Vitals:    12/23/20 0818   BP: (!) 146/83   Pulse: (!) 53  "  Resp: 16    Body mass index is 30.58 kg/m².  Weight: 78.3 kg (172 lb 9.9 oz)   Height: 5' 3" (160 cm)     Physical Exam   Constitutional: She is oriented to person, place, and time. She appears well-developed and well-nourished.   HENT:   Head: Normocephalic.   Eyes: Pupils are equal, round, and reactive to light.   Neck: Normal range of motion. Neck supple.   Cardiovascular: Normal rate and regular rhythm.   Pulmonary/Chest: Effort normal and breath sounds normal.   Abdominal: Soft. Normal appearance and bowel sounds are normal. There is no abdominal tenderness.   Musculoskeletal: Normal range of motion.   Neurological: She is alert and oriented to person, place, and time.   Skin: Skin is warm.   Psychiatric: She has a normal mood and affect.         DATA:     Laboratory:  CBC:  Recent Labs   Lab 09/18/20  0925 10/12/20  0710 11/12/20  0836   WBC 4.68 4.29 4.22   Hemoglobin 13.7 13.7 13.0   Hematocrit 42.1 42.1 39.1   Platelets 192 185 149 L       CHEMISTRIES:  Recent Labs   Lab 08/16/19  0727 09/06/19  0734  02/28/20  0720  09/18/20  0925 10/12/20  0710 11/12/20  0836   Glucose 137 H 138 H   < > 134 H   < > 121 H 138 H 121 H   Sodium 143 142   < > 144   < > 138 140 140   Potassium 4.4 3.6   < > 4.6   < > 4.5 4.4 3.8   BUN 12 11   < > 14   < > 16 16 13   Creatinine 0.8 0.8   < > 0.8   < > 0.8 0.8 0.7   eGFR if African American >60 >60   < > >60   < > >60 >60 >60   eGFR if non African American >60 >60   < > >60   < > >60 >60 >60   Calcium 10.1 9.2   < > 9.7   < > 9.5 9.4 9.0   Magnesium 1.8 1.8  --  1.8  --   --   --   --     < > = values in this interval not displayed.       CARDIAC BIOMARKERS:  Recent Labs   Lab 06/11/18  1345 07/20/19  1249   Troponin I <0.006 0.012       COAGS:  Recent Labs   Lab 02/09/18  1020 07/20/19  1249 09/18/20  0925   INR 1.0 1.0 1.0       LIPIDS/LFTS:  Recent Labs   Lab 03/04/19  0728  02/03/20  0730  09/18/20  0925 10/12/20  0710 11/12/20  0836   Cholesterol 166  --  125  --  139  -- "   --    Triglycerides 192 H  --  117  --  191 H  --   --    HDL 56  --  48  --  51  --   --    LDL Cholesterol 71.6  --  53.6 L  --  49.8 L  --   --    Non-HDL Cholesterol 110  --  77  --  88  --   --    AST 18   < > 19   < > 24 20 19   ALT 19   < > 20   < > 17 22 17    < > = values in this interval not displayed.       Hemoglobin A1C   Date Value Ref Range Status   02/03/2020 6.1 (H) 4.0 - 5.6 % Final     Comment:     ADA Screening Guidelines:  5.7-6.4%  Consistent with prediabetes  >or=6.5%  Consistent with diabetes  High levels of fetal hemoglobin interfere with the HbA1C  assay. Heterozygous hemoglobin variants (HbS, HgC, etc)do  not significantly interfere with this assay.   However, presence of multiple variants may affect accuracy.     03/04/2019 6.1 (H) 4.0 - 5.6 % Final     Comment:     ADA Screening Guidelines:  5.7-6.4%  Consistent with prediabetes  >or=6.5%  Consistent with diabetes  High levels of fetal hemoglobin interfere with the HbA1C  assay. Heterozygous hemoglobin variants (HbS, HgC, etc)do  not significantly interfere with this assay.   However, presence of multiple variants may affect accuracy.     03/16/2018 5.8 (H) 4.0 - 5.6 % Final     Comment:     According to ADA guidelines, hemoglobin A1c <7.0% represents  optimal control in non-pregnant diabetic patients. Different  metrics may apply to specific patient populations.   Standards of Medical Care in Diabetes-2016.  For the purpose of screening for the presence of diabetes:  <5.7%     Consistent with the absence of diabetes  5.7-6.4%  Consistent with increasing risk for diabetes   (prediabetes)  >or=6.5%  Consistent with diabetes  Currently, no consensus exists for use of hemoglobin A1c  for diagnosis of diabetes for children.  This Hemoglobin A1c assay has significant interference with fetal   hemoglobin   (HbF). The results are invalid for patients with abnormal amounts of   HbF,   including those with known Hereditary Persistence   of  Fetal Hemoglobin. Heterozygous hemoglobin variants (HbAS, HbAC,   HbAD, HbAE, HbA2) do not significantly interfere with this assay;   however, presence of multiple variants in a sample may impact the %   interference.         TSH  Recent Labs   Lab 09/18/20  0925   TSH 1.679       The ASCVD Risk score (Rosario AVINA Jr., et al., 2013) failed to calculate for the following reasons:    The patient has a prior MI or stroke diagnosis           Cardiovascular Testing:    EKG: (personally reviewed tracing)  Sinus bradycardia      ASSESSMENT AND PLAN     Patient Active Problem List   Diagnosis    Benign hypertensive heart disease without heart failure    CAD (coronary artery disease)    History of breast cancer    Hyperlipidemia LDL goal <70    Old myocardial infarction    Stable angina    Atherosclerosis of aorta    Prediabetes    DNR (do not resuscitate)    COPD (chronic obstructive pulmonary disease)    Squamous cell carcinoma lung    History of lung cancer    Metastatic breast cancer    Aortic arch atherosclerosis    LOGAN (dyspnea on exertion)    Abnormal stress test    Chronic heart failure with preserved ejection fraction     Patient with a past medical history of coronary artery disease. .  Past medical history significant for lung cancer.  No significant coronary artery disease on recent coronary angiogram had luminal irregularities.    Heart failure with preserved ejection fraction    Squamous cell carcinoma of lung    Breast cancer    Aortic arch atherosclerosis        Follow-up in Cardiology Clinic in 4 m            Thank you very much for involving me in the care of your patient.  Please do not hesitate to contact me if there are any questions.      Guveara Skelton MD, FAC, [facility]  Interventional Cardiologist, [facility].           This note was dictated with the help of speech recognition software.  There might be un-intended errors and/or substitutions.

## 2020-12-29 ENCOUNTER — OFFICE VISIT (OUTPATIENT)
Dept: HEMATOLOGY/ONCOLOGY | Facility: CLINIC | Age: 75
End: 2020-12-29
Payer: MEDICARE

## 2020-12-29 ENCOUNTER — HOSPITAL ENCOUNTER (OUTPATIENT)
Dept: RADIOLOGY | Facility: HOSPITAL | Age: 75
Discharge: HOME | End: 2020-12-29
Attending: INTERNAL MEDICINE
Payer: MEDICARE

## 2020-12-29 ENCOUNTER — INFUSION (OUTPATIENT)
Dept: INFUSION THERAPY | Facility: HOSPITAL | Age: 75
End: 2020-12-29
Attending: INTERNAL MEDICINE
Payer: MEDICARE

## 2020-12-29 VITALS
BODY MASS INDEX: 29.64 KG/M2 | HEART RATE: 50 BPM | TEMPERATURE: 98 F | WEIGHT: 167.31 LBS | OXYGEN SATURATION: 95 % | DIASTOLIC BLOOD PRESSURE: 89 MMHG | HEIGHT: 63 IN | SYSTOLIC BLOOD PRESSURE: 149 MMHG

## 2020-12-29 VITALS
OXYGEN SATURATION: 97 % | RESPIRATION RATE: 17 BRPM | HEART RATE: 53 BPM | TEMPERATURE: 98 F | SYSTOLIC BLOOD PRESSURE: 139 MMHG | DIASTOLIC BLOOD PRESSURE: 67 MMHG

## 2020-12-29 DIAGNOSIS — J44.9 CHRONIC OBSTRUCTIVE PULMONARY DISEASE, UNSPECIFIED COPD TYPE: ICD-10-CM

## 2020-12-29 DIAGNOSIS — R05.9 COUGH: ICD-10-CM

## 2020-12-29 DIAGNOSIS — C34.91 SQUAMOUS CELL CARCINOMA OF RIGHT LUNG: Primary | ICD-10-CM

## 2020-12-29 DIAGNOSIS — R06.09 DOE (DYSPNEA ON EXERTION): ICD-10-CM

## 2020-12-29 DIAGNOSIS — C50.111 MALIGNANT NEOPLASM OF CENTRAL PORTION OF RIGHT BREAST IN FEMALE, ESTROGEN RECEPTOR NEGATIVE: Primary | ICD-10-CM

## 2020-12-29 DIAGNOSIS — Z17.1 MALIGNANT NEOPLASM OF CENTRAL PORTION OF RIGHT BREAST IN FEMALE, ESTROGEN RECEPTOR NEGATIVE: ICD-10-CM

## 2020-12-29 DIAGNOSIS — R13.10 DYSPHAGIA, UNSPECIFIED TYPE: ICD-10-CM

## 2020-12-29 DIAGNOSIS — C34.90 SQUAMOUS CELL CARCINOMA LUNG: ICD-10-CM

## 2020-12-29 DIAGNOSIS — C50.111 MALIGNANT NEOPLASM OF CENTRAL PORTION OF RIGHT BREAST IN FEMALE, ESTROGEN RECEPTOR NEGATIVE: ICD-10-CM

## 2020-12-29 DIAGNOSIS — Z17.1 MALIGNANT NEOPLASM OF CENTRAL PORTION OF RIGHT BREAST IN FEMALE, ESTROGEN RECEPTOR NEGATIVE: Primary | ICD-10-CM

## 2020-12-29 LAB
ALBUMIN SERPL BCP-MCNC: 4.1 G/DL (ref 3.5–5.2)
ALP SERPL-CCNC: 54 U/L (ref 55–135)
ALT SERPL W/O P-5'-P-CCNC: 22 U/L (ref 10–44)
ANION GAP SERPL CALC-SCNC: 10 MMOL/L (ref 8–16)
AST SERPL-CCNC: 19 U/L (ref 10–40)
BASOPHILS # BLD AUTO: 0.05 K/UL (ref 0–0.2)
BASOPHILS NFR BLD: 1.1 % (ref 0–1.9)
BILIRUB SERPL-MCNC: 0.4 MG/DL (ref 0.1–1)
BUN SERPL-MCNC: 15 MG/DL (ref 8–23)
CALCIUM SERPL-MCNC: 9.1 MG/DL (ref 8.7–10.5)
CHLORIDE SERPL-SCNC: 102 MMOL/L (ref 95–110)
CO2 SERPL-SCNC: 28 MMOL/L (ref 23–29)
CREAT SERPL-MCNC: 0.7 MG/DL (ref 0.5–1.4)
DIFFERENTIAL METHOD: ABNORMAL
EOSINOPHIL # BLD AUTO: 0.1 K/UL (ref 0–0.5)
EOSINOPHIL NFR BLD: 1.6 % (ref 0–8)
ERYTHROCYTE [DISTWIDTH] IN BLOOD BY AUTOMATED COUNT: 13.8 % (ref 11.5–14.5)
EST. GFR  (AFRICAN AMERICAN): >60 ML/MIN/1.73 M^2
EST. GFR  (NON AFRICAN AMERICAN): >60 ML/MIN/1.73 M^2
GLUCOSE SERPL-MCNC: 117 MG/DL (ref 70–110)
HCT VFR BLD AUTO: 40.2 % (ref 37–48.5)
HGB BLD-MCNC: 13.4 G/DL (ref 12–16)
IMM GRANULOCYTES # BLD AUTO: 0.03 K/UL (ref 0–0.04)
IMM GRANULOCYTES NFR BLD AUTO: 0.7 % (ref 0–0.5)
LYMPHOCYTES # BLD AUTO: 1.1 K/UL (ref 1–4.8)
LYMPHOCYTES NFR BLD: 24.5 % (ref 18–48)
MCH RBC QN AUTO: 30.8 PG (ref 27–31)
MCHC RBC AUTO-ENTMCNC: 33.3 G/DL (ref 32–36)
MCV RBC AUTO: 92 FL (ref 82–98)
MONOCYTES # BLD AUTO: 0.4 K/UL (ref 0.3–1)
MONOCYTES NFR BLD: 9.8 % (ref 4–15)
NEUTROPHILS # BLD AUTO: 2.8 K/UL (ref 1.8–7.7)
NEUTROPHILS NFR BLD: 62.3 % (ref 38–73)
NRBC BLD-RTO: 0 /100 WBC
PLATELET # BLD AUTO: 178 K/UL (ref 150–350)
PMV BLD AUTO: 9.1 FL (ref 9.2–12.9)
POTASSIUM SERPL-SCNC: 3.7 MMOL/L (ref 3.5–5.1)
PROT SERPL-MCNC: 6.7 G/DL (ref 6–8.4)
RBC # BLD AUTO: 4.35 M/UL (ref 4–5.4)
SODIUM SERPL-SCNC: 140 MMOL/L (ref 136–145)
WBC # BLD AUTO: 4.41 K/UL (ref 3.9–12.7)

## 2020-12-29 PROCEDURE — 99999 PR PBB SHADOW E&M-EST. PATIENT-LVL V: CPT | Mod: PBBFAC,HCNC,, | Performed by: INTERNAL MEDICINE

## 2020-12-29 PROCEDURE — 99999 PR PBB SHADOW E&M-EST. PATIENT-LVL V: ICD-10-PCS | Mod: PBBFAC,HCNC,, | Performed by: INTERNAL MEDICINE

## 2020-12-29 PROCEDURE — 1159F MED LIST DOCD IN RCRD: CPT | Mod: HCNC,S$GLB,, | Performed by: INTERNAL MEDICINE

## 2020-12-29 PROCEDURE — 85025 COMPLETE CBC W/AUTO DIFF WBC: CPT | Mod: HCNC

## 2020-12-29 PROCEDURE — 99499 UNLISTED E&M SERVICE: CPT | Mod: S$GLB,,, | Performed by: INTERNAL MEDICINE

## 2020-12-29 PROCEDURE — 71046 XR CHEST PA AND LATERAL: ICD-10-PCS | Mod: 26,HCNC,, | Performed by: RADIOLOGY

## 2020-12-29 PROCEDURE — 1157F ADVNC CARE PLAN IN RCRD: CPT | Mod: HCNC,S$GLB,, | Performed by: INTERNAL MEDICINE

## 2020-12-29 PROCEDURE — 1101F PT FALLS ASSESS-DOCD LE1/YR: CPT | Mod: HCNC,CPTII,S$GLB, | Performed by: INTERNAL MEDICINE

## 2020-12-29 PROCEDURE — 99214 PR OFFICE/OUTPT VISIT, EST, LEVL IV, 30-39 MIN: ICD-10-PCS | Mod: HCNC,S$GLB,, | Performed by: INTERNAL MEDICINE

## 2020-12-29 PROCEDURE — 36592 COLLECT BLOOD FROM PICC: CPT | Mod: HCNC

## 2020-12-29 PROCEDURE — 3288F PR FALLS RISK ASSESSMENT DOCUMENTED: ICD-10-PCS | Mod: HCNC,CPTII,S$GLB, | Performed by: INTERNAL MEDICINE

## 2020-12-29 PROCEDURE — 3288F FALL RISK ASSESSMENT DOCD: CPT | Mod: HCNC,CPTII,S$GLB, | Performed by: INTERNAL MEDICINE

## 2020-12-29 PROCEDURE — 1157F PR ADVANCE CARE PLAN OR EQUIV PRESENT IN MEDICAL RECORD: ICD-10-PCS | Mod: HCNC,S$GLB,, | Performed by: INTERNAL MEDICINE

## 2020-12-29 PROCEDURE — 3079F DIAST BP 80-89 MM HG: CPT | Mod: HCNC,CPTII,S$GLB, | Performed by: INTERNAL MEDICINE

## 2020-12-29 PROCEDURE — 3074F SYST BP LT 130 MM HG: CPT | Mod: HCNC,CPTII,S$GLB, | Performed by: INTERNAL MEDICINE

## 2020-12-29 PROCEDURE — 99214 OFFICE O/P EST MOD 30 MIN: CPT | Mod: HCNC,S$GLB,, | Performed by: INTERNAL MEDICINE

## 2020-12-29 PROCEDURE — 80053 COMPREHEN METABOLIC PANEL: CPT | Mod: HCNC

## 2020-12-29 PROCEDURE — 3079F PR MOST RECENT DIASTOLIC BLOOD PRESSURE 80-89 MM HG: ICD-10-PCS | Mod: HCNC,CPTII,S$GLB, | Performed by: INTERNAL MEDICINE

## 2020-12-29 PROCEDURE — 3074F PR MOST RECENT SYSTOLIC BLOOD PRESSURE < 130 MM HG: ICD-10-PCS | Mod: HCNC,CPTII,S$GLB, | Performed by: INTERNAL MEDICINE

## 2020-12-29 PROCEDURE — A4216 STERILE WATER/SALINE, 10 ML: HCPCS | Mod: HCNC | Performed by: INTERNAL MEDICINE

## 2020-12-29 PROCEDURE — 1159F PR MEDICATION LIST DOCUMENTED IN MEDICAL RECORD: ICD-10-PCS | Mod: HCNC,S$GLB,, | Performed by: INTERNAL MEDICINE

## 2020-12-29 PROCEDURE — 1126F PR PAIN SEVERITY QUANTIFIED, NO PAIN PRESENT: ICD-10-PCS | Mod: HCNC,S$GLB,, | Performed by: INTERNAL MEDICINE

## 2020-12-29 PROCEDURE — 3008F PR BODY MASS INDEX (BMI) DOCUMENTED: ICD-10-PCS | Mod: HCNC,CPTII,S$GLB, | Performed by: INTERNAL MEDICINE

## 2020-12-29 PROCEDURE — 71046 X-RAY EXAM CHEST 2 VIEWS: CPT | Mod: 26,HCNC,, | Performed by: RADIOLOGY

## 2020-12-29 PROCEDURE — 71046 X-RAY EXAM CHEST 2 VIEWS: CPT | Mod: TC,HCNC,FY

## 2020-12-29 PROCEDURE — 1126F AMNT PAIN NOTED NONE PRSNT: CPT | Mod: HCNC,S$GLB,, | Performed by: INTERNAL MEDICINE

## 2020-12-29 PROCEDURE — 3008F BODY MASS INDEX DOCD: CPT | Mod: HCNC,CPTII,S$GLB, | Performed by: INTERNAL MEDICINE

## 2020-12-29 PROCEDURE — 1101F PR PT FALLS ASSESS DOC 0-1 FALLS W/OUT INJ PAST YR: ICD-10-PCS | Mod: HCNC,CPTII,S$GLB, | Performed by: INTERNAL MEDICINE

## 2020-12-29 PROCEDURE — 63600175 PHARM REV CODE 636 W HCPCS: Mod: HCNC | Performed by: INTERNAL MEDICINE

## 2020-12-29 PROCEDURE — 25000003 PHARM REV CODE 250: Mod: HCNC | Performed by: INTERNAL MEDICINE

## 2020-12-29 PROCEDURE — 99499 RISK ADDL DX/OHS AUDIT: ICD-10-PCS | Mod: S$GLB,,, | Performed by: INTERNAL MEDICINE

## 2020-12-29 RX ORDER — SODIUM CHLORIDE 0.9 % (FLUSH) 0.9 %
10 SYRINGE (ML) INJECTION
Status: DISCONTINUED | OUTPATIENT
Start: 2020-12-29 | End: 2020-12-29 | Stop reason: HOSPADM

## 2020-12-29 RX ORDER — HEPARIN 100 UNIT/ML
500 SYRINGE INTRAVENOUS
Status: DISCONTINUED | OUTPATIENT
Start: 2020-12-29 | End: 2020-12-29 | Stop reason: HOSPADM

## 2020-12-29 RX ADMIN — Medication 10 ML: at 08:12

## 2020-12-29 RX ADMIN — HEPARIN 500 UNITS: 100 SYRINGE at 08:12

## 2020-12-29 NOTE — PROGRESS NOTES
Subjective:       Patient ID: Ade Castellano is a 75 y.o. female.    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   Pathology showed a 1.15 cm, grade 3 infiltrating ductal carcinoma.  One sentinel lymph node was negative for malignancy.  Margins were negative and the closest margin was 1 cm.  She was staged a little pT1c little pN0 cM0 stage IA.  She declined adjuvant chemo therapy.  She completed post operative radiation therapy at 400 cGy per fraction to 4000 cGy 9/2014         Pt hospitalized 11/2017   Ms. Castellano was admitted for acute on chronic respiratory failure requiring intubation and ventilation. Has large lung mass in right lung with probable post obstructive pneumonia resulting in COPD exacerbation. But also, patient had ran out of home O2 prior to onset of symptoms, likely contributing to presentation. Initiated on broad spectrum abx, IV steroids, duo-nebs and pulmonology consulted for ventilator co-management. Successfully extubated to BiPAP on 11/30. Then de-escalated to low flow nasal cannula. Abx tailored to augmentin for broad coverage, including anaerobic bacteria /     CTA chest 11/29/2017 revealed   Large right hilar mass with involvement of adjacent segmental pulmonary arterial branches and bronchi with associated postobstructive atelectasis and volume loss in the right middle lobe with adjacent ground glass opacity.  Findings highly concerning for underlying neoplastic process. Mediastinal and axillary adenopathy, with a right axillary lymph node measuring up to 2.0 cm in  short axis diameter. No definite evidence of pulmonary thromboembolism.    She is followed by Pulmonology   She underwent rt axillary LN bx at outside facility- on 1/16/2018 at [facility]  Pathology revealed metastatic poorly differentiated carcinoma of unknown primary site  TTF-1 negative, napsin negative  , cytokeratin 7 positive, cytokeratin 20 negative, p63 negative, cytokeratin 5/6 focal dim positivity    She next underwent lung bx at [facility] 1/31/2018   Pathology revealed benign lung tissue    She underwent Repeat Right Lung Bx 2/14/2018 Pathology reveals Squamous cell carcinoma  PD-L1 10% low expression  EGFR NEG  ALK NEG  BRAF NEG      Outside slides axillary LN specimen  requested for review/comparison to lung bx findings     1) Right Lung Bx 2/14/2018  Supplemental Diagnosis  Immunohistochemical stains show strong nuclear staining for p63 in essentially all tumor cells and very strong  cytoplasmic and membrane staining for CK5/6, also in essentially all tumor cells. TTF-1 and CK7 are negative  within tumor cells but do stain native pulmonary elements present within the biopsy. A stain for mucicarmine is  negative. Positive and negative controls function appropriately.  Final diagnosis: Specimen submitted as right lung biopsy  -Squamous cell carcinoma  (Electronically Signed: 2018-02-20 09:12:07 )  Diagnosed by: Phong Thompson  FINAL PATHOLOGIC DIAGNOSIS  Fragments of pulmonary parenchyma (submitted as right lung biopsy):    FINAL PATHOLOGIC DIAGNOSIS 1. Lymph node, right axilla, biopsy, review of 10 outside slides [facility], JS [phone],  collected January 11, 2018: Metastatic poorly differentiated carcinoma (see comment).  The histologic section shows fibrous stroma infiltrated by nest of poorly differentiated malignant cells including  occasional dyskeratotic cells morphologically suggestive of metastatic squamous cell carcinoma.  Immunohistochemical stains are nonspecific;  the cells are positive for cytokeratin 7 and cytokeratin 5/6 (focal) and  negative for cytokeratin 20, TTF-1, p63, GCDFP, mammoglobin, and CEA      PET/CT  4/19/2018 revealed there has been a excellent response to therapy.  At least 90% reduction in malignant activity.    She s/p  cycle 6 of carboplatin/Abraxane completed 7/2018        PET/CT 2/21/2019 increased size and irregularity of the right axillary lymph node with increased FDG avidity     MAMMO/US rt breast 2/2019 revealed suspicious findings rt axilla LN.  Biopsy of the lymph node measuring 1.4 x 1.1 x 1.3 recommended    Pathology 3/11/2019   FINAL PATHOLOGIC DIAGNOSIS  Right axilla, mass, core biopsy:  Positive for poorly differentiated carcinoma.  he staining profile of the current axillary mass match more closely with the previous  breast carcinoma (due to the p63 negativity in both the 2014 breast cancer and the current axillary mass lesion but  p63 positivity in the lung mass from 2018).  This staining profile, together with the comparison with the patient's prior breast tumor and prior lung tumor supports  a diagnosis of poorly differentiated carcinoma and the malignancy appears morphologically similar to the prior  malignancies from both sites, but the staining profile in this small sample from the axillary mass more closely  correlates with the prior breast malignancy. Pathologic subclassification of this malignancy's primary location is not  definitive and clinical correlation is recommended definitively decide on possible primary location in this patient's  axillary mass sample.  Supplemental (2):  Additional immunohistochemical staining for progesterone receptor and HER2 are completed per clinician request  with following results:  Progesterone receptor: Negative; 0% nuclear tumor cell staining.  HER2: Negative; stain score = 0.  Supplemental (3):    Slides sent to [facility] for outside review  It was determined that agree with  interpretation  of this case. In my opinion, the lung tumor corresponds to a squamous cell  carcinoma and appears to be unrelated to the invasive ductal carcinoma of the breast. The axillary mass, in my  opinion, corresponds to metastases of the breast primary. Although it is a poorly differentiated carcinoma, the  immunophenotype is most consistent with the one that the breast primary exhibited, and is inconsistent with a  metastatic squamous cell carcinoma. I      Further testing sent for cancer type ID also sent and results have revealed molecular diagnosis testing revealed head and neck salivary gland carcinoma probability 84% breast adenocarcinoma could not be excluded with a 12% probability      She is s/p AC q21d x 4 cycles completed 9/6/2019   PET/CT 9/19/2019 shows disease response with interval decrease in size and uptake of several right axillary lymph nodes and a supraclavicular lymph node.  Mild residual activity may indicate viable tumor.  No new hypermetabolic findings worrisome for malignancy.    Pt followed by Rad/Onc  She was presented at [facility] tumor board and it was determined to proceed with radiation only  She  completed  RT 12/16/2019   The right axilla and right supraclavicular region was treated at 200 cGy per fraction to 4400 cGy. The PET(+) disease in the right axilla and right supraclavicular fossa will be boosted at 200 cGy per fraction to 6000 cGy total dose.    Interval Hx 6/16/2020 She continues with LOGAN  She was prescribed Levaquin and steroids per PCP for COPD exacerbation  She reports cough has improved  She is followed by Dr. Katz, pulmonology  Further w/u planned including 2-D echo, CTA chest     Interval Hx: 7/21/2020   Pt reports she underwent extensive workup and it was determined that worsening cough/SOB likely from radiation  No new issues  Appetite and weight stable    Interval Hx : 8/31/2020  No new issues  Continues with intermittent chronic cough  Chronic mild LOGAN  No  fevers  Mild arthralgias localized rt shoulder    Recent PET/CT imaging shows Mildly increased size and mild radiotracer uptake within a small right axillary lymph node, equivocal for metastatic versus reactive etiology.  Recommend short-term follow-up to assess for stability versus evolution. Otherwise, no extrathoracic hypermetabolic tumor identified.    Interval Hx : 11/12/2020   Pt slightly nervous/anxious  No new issues  She continues with mild LOGAN  No CP   PET/CT 10/23/2020 shows Stable mildly hypermetabolic right axillary lymph node/nodular density contralateral to the site of injection.  Differential considerations include metastatic lymph node, reactive lymph node from benign right upper extremity process, and fat necrosis. Otherwise, no other hypermetabolic disease identified    Mammo diagnostic right /US 11/4/2020   Impression:   1. No suspicious finding either on mammogram or ultrasound at the site of the palpable lump in the right breast at 10:00 o'clock. A bilateral mammogram is recommended in June 2020 to return to the patient to annual screening.   2. Redemonstration of the morphologically abnormal lymph node in the right axilla that contains a biopsy clip, compatible with the known recurrence metastatic breast cancer that has been treated with chemotherapy. The appearance of this lymph node is unchanged from 06/26/2020 and overall appears smaller when compared to 03/11/2019.     Interval Hx: 12/29/2020  Pt recently diagnosed with vertigo and was prescribed medication  She continues with chronic cough and mild LOGAN  She is followed by Pulmonology   No CP  No fevers        PrevHx:She had an abnormal mammogram 6/4/2014 which  Revealed a round solid mass 6mm in rt breast.  She then underwent U/S guided core bx of rt breast mass on 6/17/2014.  Pathology revealed infiltrating ductal carcinoma Grade 3 with tumor  Present in thin-walled spaces suggestive of lymphatic spaces. Hormone  Receptor status on tumor  "specimen revealed ER negative 0% ,  MN negative 0% and Her 2 jose maria negative.  She subsequently underwent rt segmental mastectomy and SLN bx  On 7/11/2014. Pathology of rt breast lumpectomy revealed invasive  Ductal carcinoma with micropapillary pattern ( Invasive micropapillary  Ca) with max tumor dimension 11.5mm with suggestion of tumor in   Thin walled spaces c/w lymphovascular involvement. Surgical  Margins free of tumor, grade 3, ER/MN neg , Her 2 jose maria neg   With [place] Lymph node negative for Neoplasm.   Pathologic staging [id number](i-)  Her mother was diagnosed with breast cancer in her 50's/  She has no family history of ovarian cancer.           Review of Systems   Constitutional: Negative for appetite change, fever and unexpected weight change.   HENT: Negative for mouth sores and rhinorrhea.    Eyes: Negative for visual disturbance.   Respiratory: Positive for cough and shortness of breath (improved).    Cardiovascular: Negative for chest pain.   Gastrointestinal: Negative for abdominal pain and diarrhea.   Genitourinary: Negative for frequency.   Musculoskeletal: Positive for arthralgias. Negative for back pain.   Skin: Negative for rash.   Neurological: Negative for dizziness and headaches.   Hematological: Negative for adenopathy.   Psychiatric/Behavioral: The patient is not nervous/anxious.        Objective:          Vitals:    12/29/20 0835   BP: (!) 149/89   BP Location: Left arm   Patient Position: Sitting   BP Method: Medium (Automatic)   Pulse: (!) 50   Temp: 97.7 °F (36.5 °C)   TempSrc: Oral   SpO2: 95%   Weight: 75.9 kg (167 lb 5.3 oz)   Height: 5' 3" (1.6 m)   '    Physical Exam   Constitutional: She is oriented to person, place, and time. She appears well-developed and well-nourished.   HENT:   Head: Normocephalic.   Mouth/Throat: Oropharynx is clear and moist. No oropharyngeal exudate.   Eyes: Conjunctivae and lids are normal. Pupils are equal, round, and reactive to light. No scleral icterus. "   Neck: Normal range of motion. Neck supple. No thyromegaly present.   Cardiovascular: Normal rate, regular rhythm and normal heart sounds.    No murmur heard.  Pulmonary/Chest: Breath sounds normal. She has no wheezes. She has no rales.   Abdominal: Soft. Bowel sounds are normal. She exhibits no distension and no mass. There is no hepatosplenomegaly. There is no tenderness. There is no rebound and no guarding.   Musculoskeletal: Normal range of motion. She exhibits no edema and no tenderness.   Left breast- no masses or axillary LAD noted  Right breast- firmness 11-12oc, no skin changes, no axillary lad   Lymphadenopathy:     She has no cervical adenopathy.     She has no axillary adenopathy.        Right: No supraclavicular adenopathy present.        Left: No supraclavicular adenopathy present.   Neurological: She is alert and oriented to person, place, and time. No cranial nerve deficit. Coordination normal.   Skin: Skin is warm and dry. No ecchymosis, no petechiae and no rash noted. No erythema.   Psychiatric: She has a normal mood and affect.             CT a/p w/contrast 11/29/2018   1. No acute abnormality identified within the abdomen and pelvis.    2.  There are a few nonspecific prominent lymph nodes in the upper abdomen including a periesophageal lymph node which measures 1.0 cm in short axis diameter.    3.  Significant abdominal aortic atherosclerosis and abdominal aorta ectasia.    4.  Additional findings as above.    PET/CT 1/26/2018   1.  Intense FDG uptake within the known right infrahilar lung mass, compatible with malignancy.  It is unclear if this represents primary lung malignancy or metastatic breast cancer.    2.  Intensely hypermetabolic right axillary, retropectoral, and lower right cervical lymph nodes, compatible with metastases.    3.  Abnormal FDG uptake along right breast skin thickening, new from prior chest CTA and mammogram.  This may relate to localized edema/inflammation, though  correlation with physical exam and mammography are recommended to exclude underlying inflammatory carcinoma.      MRI Brain w w/out contrast 2/9/2018  1.  No evidence of intracranial metastases.  2.  Sinus disease       Rt axillary LN bx at outside facility- on 1/16/2018 at [facility]  Pathology revealed metastatic poorly differentiated carcinoma of unknown primary  site 1/16/2018 at [facility]  TTF-1 negative, napsin negative  , cytokeratin 7 positive, cytokeratin 20 negative, p63 negative, cytokeratin 5/6 focal dim positivity    LUNG BIOPSY 1/31/2018  Pathology revealed benign lung tissue         SPECIMEN  1) Right Lung Bx 2/14/2018  Supplemental Diagnosis  Immunohistochemical stains show strong nuclear staining for p63 in essentially all tumor cells and very strong  cytoplasmic and membrane staining for CK5/6, also in essentially all tumor cells. TTF-1 and CK7 are negative  within tumor cells but do stain native pulmonary elements present within the biopsy. A stain for mucicarmine is  negative. Positive and negative controls function appropriately.  Final diagnosis: Specimen submitted as right lung biopsy  -Squamous cell carcinoma  (Electronically Signed: 2018-02-20 09:12:07 )  Diagnosed by: Phong Thompson  FINAL PATHOLOGIC DIAGNOSIS  Fragments of pulmonary parenchyma (submitted as right lung biopsy):    FINAL PATHOLOGIC DIAGNOSIS 1. Lymph node, right axilla, biopsy, review of 10 outside slides [facility], JS [phone],  collected January 11, 2018: Metastatic poorly differentiated carcinoma (see comment).  The histologic section shows fibrous stroma infiltrated by nest of poorly differentiated malignant cells including  occasional dyskeratotic cells morphologically suggestive of metastatic squamous cell carcinoma.  Immunohistochemical stains are nonspecific; the cells are positive for cytokeratin 7 and cytokeratin 5/6 (focal) and  negative for cytokeratin 20, TTF-1,  p63, GCDFP, mammoglobin, and CEA        PET/CT 2/21/2019  Increased size and irregularity of the right axillary lymph node with increased FDG avidity.  Although this would be atypical in location for lung carcinoma, the increased size and avidity must be concerning for metastatic disease in this patient with history of squamous cell lung cancer.     US Rt breast and mammo 2/26/2019  Impression:  Right  Lymph Node: Right axilla lymph node. Assessment: 4 - Suspicious finding. Biopsy is recommended.      BI-RADS Category:   Right: 4 - Suspicious  Overall: 4 - Suspicious     Recommendation:  Biopsy is recommended. Biopsy of the lymph node measuring 1.4 x 1.1 x 1.3 recommended , the one with the round shape configuration, corresponding to the most recent PET CT finding.         Pathology 3/11/2019   FINAL PATHOLOGIC DIAGNOSIS  Right axilla, mass, core biopsy:  Positive for poorly differentiated carcinoma.  See comment.  Comment:  The biopsy from the right axilla mass shows a poorly differentiated carcinoma in background lymphoid tissue  with enlarged cells showing increased nuclear size, prominent nucleoli, moderate amounts of cytoplasm and mitotic  figures. Immunostains are completed and reveal the tumor cells to stain positively with cytokeratin AE 1/AE3, CK  5/6 and CK 7. The tumor cells are negative for CK 20, Estrogen receptor, p63 and TTF-1. All stains have  satisfactory positive and negative controls. The patient's prior cases will be reviewed and an additional stain for  JUAREZ-3 is pending in an attempt to pinpoint the primary site of this malignancy and results will follow in a  supplemental report.      Supplemental Diagnosis  3/11/2019  The current axillary mass is compared to the patient's prior right lung biopsy (case number [id number]) and the  current axillary mass is morphologically similar to the lung malignancy. Also, the current axillary mass is compared  to the patient's prior breast resection (Case  number [id number]) and appears similar as well. P63 is negative in the  [id number] tumor and CK 5/6 shows scattered positive staining in the [id number] tumor.  Immunostain for JUAREZ-3 is completed and shows only rare weak to moderate staining within the tumor cell nuclei  with satisfactory positive and negative controls. This stain is positive in the surrounding lymphocytes. This staining  pattern is non-specific and does not definitively differentiate between a lung and breast primary malignancy in this  right axilla mass biopsy. The staining profile of the current axillary mass match more closely with the previous  breast carcinoma (due to the p63 negativity in both the 2014 breast cancer and the current axillary mass lesion but  p63 positivity in the lung mass from 2018).  This staining profile, together with the comparison with the patient's prior breast tumor and prior lung tumor supports  a diagnosis of poorly differentiated carcinoma and the malignancy appears morphologically similar to the prior  malignancies from both sites, but the staining profile in this small sample from the axillary mass more closely  correlates with the prior breast malignancy. Pathologic subclassification of this malignancy's primary location is not  definitive and clinical correlation is recommended definitively decide on possible primary location in this patient's  axillary mass sample.  Supplemental (2):  Additional immunohistochemical staining for progesterone receptor and HER2 are completed per clinician request  with following results:  Progesterone receptor: Negative; 0% nuclear tumor cell staining.  HER2: Negative; stain score = 0.  Supplemental (3):  [facility] DIAGNOSIS:  FINAL DIAGNOSIS  Breast, right, needle core biopsy ([id number]; 06/17/2014): Invasive ductal carcinoma, Jaiden grade III (of  III), with focal micropapillary features.  Immunohistochemical stains performed at the referring institution show that the tumor cells  have the following  phenotype: - Estrogen receptor: Negative (0% tumor cells staining). - Progesterone receptor: Negative (0% tumor  cells staining). - HER2: Negative (score 0).  Lymph nodes, right, sentinel biopsy and lumpectomy ([id number]; 07/11/2014):  1. Right sentinel lymph node: A single (1) lymph node is negative for metastatic carcinoma.  Immunohistochemical stains performed by the referring institution and reviewed at [facility] for cytokeratin are  negative, confirming the diagnosis.  2. Right breast: Invasive ductal carcinoma with micropapillary features, per report 11.5 mm in greatest dimension.  Foci suspicious for lymphovascular invasion identified. Margins of resection are free of tumor.  Immunohistochemical stains performed by the referring institution and reviewed at [facility] show that the tumor  cells are negative for p63 and show patchy positivity for CK 5/6.  Lung, right, needle core biopsy ([id number]; 02/14/2018): Squamous cell carcinoma.    Immunohistochemical stains performed by the referring institution show the tumor cells are strongly positive for p63  and CK 5/6, while negative for TTF-1 and CK7. These result support the diagnosis. Axilla, right, needle core biopsy  ([id number]; 03/11/2019): Lymph node positive for metastatic carcinoma, most consistent with breast primary  (see comment).  Immunohistochemical stains performed by the referring institution and reviewed at [facility] show that the tumor  cells are negative for p63, focally positive for CK 5/6, focally and weakly positive for JUAREZ-3, and strongly positive  for CK7. They are also negative for estrogen receptor, progesterone receptor, and HER2 JAM (score 0).  COMMENT:  Thank you for allowing me to review the case of this 73-year-old lady who recently underwent needle core biopsies  of a right axillary mass and who has a previous diagnosis of an invasive ductal carcinoma of the right breast, as well  as a squamous cell  carcinoma of the lung. I am reviewing this case because of my special interest in breast  pathology.  I agree with your interpretation of this case. In my opinion, the lung tumor corresponds to a squamous cell  carcinoma and appears to be unrelated to the invasive ductal carcinoma of the breast. The axillary mass, in my  opinion, corresponds to metastases of the breast primary. Although it is a poorly differentiated carcinoma, the  immunophenotype is most consistent with the one that the breast primary exhibited, and is inconsistent with a  metastatic squamous cell carcinoma. I did share the case with Dr. Anabel Stone, one of our pulmonary pathologists,  and she concurs with this interpretation.  Note: Report attached.  Performing location:  [place] [address] [place] [address]    CT neck/chest/abd/pelvis w/contrast 4/26/2019   The previously described right hilar mass is no longer visible.  There is persistent volume loss in the right middle lobe this appears similar to the prior PET-CT February 21, 2019.    Persistent abnormal right axillary node today measuring about 15 mm compared 18 mm prior.  The positioning of the adjacent nodes is slightly different likely accounting for the slight difference in measurement.    Cluster of tree-in-bud nodular densities left lower lobe series 2, image 93.  This may be related to small airways disease though serial follow-up suggested.      PET/CT 6/20/2019   New and worsening hypermetabolic lymph nodes concerning for metastatic breast cancer as described above.  Tissue sampling would be required for definitive diagnosis.      2-D echo 6/27/2019   · Normal left ventricular systolic function. The estimated ejection fraction is 55%  · Concentric left ventricular remodeling.  · Normal LV diastolic function.  · Normal right ventricular systolic function.  · Moderate left atrial enlargement.  · Mild right atrial enlargement.  · Mild  aortic regurgitation.  · Mild mitral regurgitation.  · Mild tricuspid regurgitation.  · Normal central venous pressure (3 mm Hg).  · The estimated PA systolic pressure is 25 mm Hg      PET/CT 9/19/2019   Favorable response to therapy with interval decrease in size and uptake of several right axillary lymph nodes and a supraclavicular lymph node.  Mild residual activity may indicate viable tumor.    No new hypermetabolic findings worrisome for malignancy.    PET/CT 2/28/2020  1.  No evidence of hypermetabolic tumor.  Continued improvement of the right axilla status post adjuvant radiation.    2.  No new hypermetabolic lesions      CTA 6/17/2020  1. No evidence of pulmonary embolus. No aortic dissection.   2. There is no evidence for new or progressive disease in the chest as compared to PET/CT 6/20/2019 in this patient with history of right breast cancer and right lung neoplasm. The patient does have a more recent PET/CT 2/28/2020 from an outside facility per the electronic medical record although images are not available for direct comparison. Correlation with these images may be beneficial. Continued long-term surveillance recommended.  3. Stable appearance of the treated tumor in the right hilum/middle lobe.  4. Stable treated right breast cancer and axillary adenopathy without evidence for developing breast mass or new right axillary adenopathy.  5. Stable tiny nodularity/tree-in-bud densities in the left lung, probably postinfectious or inflammatory.  6. Wall thickening of the esophagus may be correlated for esophagitis. Possible tiny hiatus hernia.  7. Slight bronchial wall thickening may be correlated for bronchitis.  8. Mild to moderate emphysema as before.  9. Reflux of contrast into the IVC and hepatic veins is nonspecific but may be correlated for elevated right heart pressures.  10. Coronary calcifications and/or stents similar to prior.    Echo 5/21/2020  Technically difficult study.   Normal left  ventricular systolic function.   Left ventricular ejection fraction is estimated at 55%.   Severe mitral annular calcification.   Mild mitral valve regurgitation.   Aortic valve sclerosis without stenosis or regurgitation.     PFTs 6/17/2020  Spirometry reveals a very severe obstructive lung defect, which does not significantly improve post bronchodilators. Lung volumes revealed air trapping. Diffusing capacity was moderately impaired.        Del 6/26/2020  BI-RADS Category:   Overall: 2 - Benign      PET/CT 10/23/2020   Impression:     Stable mildly hypermetabolic right axillary lymph node/nodular density contralateral to the site of injection.  Differential considerations include metastatic lymph node, reactive lymph node from benign right upper extremity process, and fat necrosis.  Recommend physical examination of the upper extremity and consideration of ultrasound for further evaluation of the node/nodule and possible image guided biopsy.  Otherwise, attention on follow-up.     Otherwise, no other hypermetabolic disease identified      Mammo diagnostic right /US 11/4/2020   Impression:   1. No suspicious finding either on mammogram or ultrasound at the site of the palpable lump in the right breast at 10:00 o'clock. A bilateral mammogram is recommended in June 2020 to return to the patient to annual screening.   2. Redemonstration of the morphologically abnormal lymph node in the right axilla that contains a biopsy clip, compatible with the known recurrence metastatic breast cancer that has been treated with chemotherapy. The appearance of this lymph node is unchanged from 06/26/2020 and overall appears smaller when compared to 03/11/2019.     Abd US 12/11/2020   Impression:     1. Echogenic liver likely representing hepatic steatosis.  2. Right upper quadrant ultrasound otherwise is unremarkable.      Assessment:       1.  History of Squamous cell carcinoma of right lung    2. Malignant neoplasm of central  portion of right breast in female, estrogen receptor negative     3. LOGAN (dyspnea on exertion)    4. Cough    5. 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  S/p adjuvant RT completed 9/2014 Pt declined adjuvant chemo  Follow-up Mammo 6/12/2017 No mammographic evidence of malignancy.  Pt  hospitalized 11/2017 with acute-on-chronic  resp failure  Abnormal CT imaging revealing Large right hilar mass with involvement of  adjacent segmental pulmonary arterial branches and bronchi with associated postobstructive atelectasis  and involvement of mediastinal and axillary adenopathy   S/p rt axillary LN bx ( outside facility) - metastatic poorly differentiated carcinoma of unknown primary  site  status post  lung biopsy 1/31/2017 -benign lung tissue  PET/CT 1/26/2018   Intense FDG uptake within the known right infrahilar lung mass, compatible with malignancy.   Intensely hypermetabolic right axillary, retropectoral, and lower right cervical lymph nodes, compatible with metastases.  Abnormal FDG uptake along right breast skin thickening, new from prior chest CTA and mammogram.    Repeat lung bx 2/14/2018 revealed Squamous Cell CA lung  PD-L1 10% low expression  EGFR NEG  ALK NEG    Pt treated for advanced squamous cell CA of lung   She s/p  cycle 6 of carboplatin/Abraxane Day1 completed 7/2/2018 ( day 15 held due to prolonged cytopenias)  She completed therapy with near CR     PET/CT 2/21/2019 showed increased size and irregularity of the right axillary lymph node with increased FDG avidity    t MAMMO/US rt breast 3/2019  reveals suspicious findings rt axilla LN.  Biopsy of the lymph node measuring 1.4 x 1.1 x 1.3     Pt s/p  rt axillary LN bx  Pathology 3/11/2019   FINAL PATHOLOGIC DIAGNOSIS  Right axilla, mass, core biopsy:  Positive for  poorly differentiated carcinoma.- likely breast primary   ERneg PRneg Her 2 neg    Outside review of specimen(s) revealed  lung tumor corresponds to a squamous cell carcinoma and appears to be unrelated to the invasive ductal carcinoma of the breast. It was determined The axillary mass, in my opinion, corresponded  to metastases of the breast primary. Although it is a poorly differentiated carcinoma, theimmunophenotype is most consistent with the one that the breast primary exhibited, and is inconsistent with a metastatic squamous cell carcinoma.     CT imaging 4/26/2019 persistent rt axillary LAD, no other sites of disease     Pf followed by Rad/Onc regarding RT     Lymph node biopsy 3/11/2019 Right axilla, mass, core biopsy:  Positive for poorly differentiated carcinoma.   favor breast primary   Pt was discussed at multidisciplinary tumor board  D/w Pathologist    PET/CT 6/20/2019  New and worsening hypermetabolic lymph nodes concerning for metastatic breast cancer     Previously Discussed  imaging findings in detail with patient which reveals new and worsening hypermetabolic melena metabolic lymph nodes including hypermetabolic supraclavicular node.  Therefore, at treatment option will be systemic chemotherapy in light of the recent imaging findings.  She will be followed by her surgical oncologist Dr. Christopher      IT was determined to proceed with systemic chemotherapy   S/p  AC x13aesy x 3 wks x 4 wks completed 9/6/2019   ( pt has not received in past)      PET/CT 9/19/2019 shows disease response with interval decrease in size and uptake of several right axillary lymph nodes and a supraclavicular lymph node.  Mild residual activity may indicate viable tumor.  No new hypermetabolic findings worrisome for malignancy.    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   She completed chemotherapy 9/6/2019  and has had a  "near complete response.  It was determined  Radiation will be given to the original areas of hypermetabolic activity in the right axilla and right supraclavicular region  Pt was presented at multidisciplinary tumor board    Pt completed  RT 12/16/2019       PET/CT 10/23/2020 shows Stable mildly hypermetabolic right axillary lymph node/nodular density contralateral to the site of injection.   Otherwise, no other hypermetabolic disease identified    Mammo diagnostic right /US 11/4/2020  BI-RADS Category:   Overall: 2 - Benign      Plan CXR   Plan CT imaging prior to f/u     Addendum: Pt notified CXR unremarkable. Pt taking PPI. Upon further questioning,pt reports " food gets stuck sometimes." Plan Ambulatory Referral to GI    Advance Care Planning     Power of   I previously  initiated the process of advance care planning today and explained the importance of this process to the patient.  I introduced the concept of advance directives to the patient, as well. Then the patient received detailed information about the importance of designating a Health Care Power of  (HCPOA). She was also instructed to communicate with this person about their wishes for future healthcare, should she become sick and lose decision-making capacity. The patient has previously appointed a HCPOA. After our discussion, the patient has decided to complete a HCPOA and has appointed her son and niece and  I spent a total time of 16 minutes discussing this issue with the patient.         Cc: Swetha Katz M.D.         MD Tory Roberson MD Raymond Gould, MD                          "

## 2020-12-29 NOTE — Clinical Note
CXR today or tomorrow  PET/CT 1- 2 week prior to f/u in 6 weeks  Cbc,cmp 1 week prior  to f/u 6 weeks

## 2020-12-30 ENCOUNTER — TELEPHONE (OUTPATIENT)
Dept: GASTROENTEROLOGY | Facility: CLINIC | Age: 75
End: 2020-12-30

## 2020-12-31 ENCOUNTER — TELEPHONE (OUTPATIENT)
Dept: HEMATOLOGY/ONCOLOGY | Facility: CLINIC | Age: 75
End: 2020-12-31

## 2020-12-31 NOTE — TELEPHONE ENCOUNTER
Patient called and would like to speak to you regarding her getting the Covid Vaccine. Would like your opinion if she should proceed with the vaccination as well as get your thoughts.     If you could call her at [phone].

## 2021-01-04 ENCOUNTER — TELEPHONE (OUTPATIENT)
Dept: FAMILY MEDICINE | Facility: CLINIC | Age: 76
End: 2021-01-04

## 2021-01-11 ENCOUNTER — TELEPHONE (OUTPATIENT)
Dept: GASTROENTEROLOGY | Facility: CLINIC | Age: 76
End: 2021-01-11

## 2021-01-14 ENCOUNTER — HOSPITAL ENCOUNTER (OUTPATIENT)
Dept: RADIOLOGY | Facility: HOSPITAL | Age: 76
Discharge: HOME | End: 2021-01-14
Attending: INTERNAL MEDICINE
Payer: MEDICARE

## 2021-01-14 DIAGNOSIS — Z17.1 MALIGNANT NEOPLASM OF CENTRAL PORTION OF RIGHT BREAST IN FEMALE, ESTROGEN RECEPTOR NEGATIVE: ICD-10-CM

## 2021-01-14 DIAGNOSIS — C34.91 SQUAMOUS CELL CARCINOMA OF RIGHT LUNG: ICD-10-CM

## 2021-01-14 DIAGNOSIS — C50.111 MALIGNANT NEOPLASM OF CENTRAL PORTION OF RIGHT BREAST IN FEMALE, ESTROGEN RECEPTOR NEGATIVE: ICD-10-CM

## 2021-01-14 PROCEDURE — 78815 PET IMAGE W/CT SKULL-THIGH: CPT | Mod: TC,HCNC,PS

## 2021-01-14 PROCEDURE — 78815 NM PET CT ROUTINE: ICD-10-PCS | Mod: 26,PS,HCNC, | Performed by: RADIOLOGY

## 2021-01-14 PROCEDURE — 78815 PET IMAGE W/CT SKULL-THIGH: CPT | Mod: 26,PS,HCNC, | Performed by: RADIOLOGY

## 2021-01-22 ENCOUNTER — OFFICE VISIT (OUTPATIENT)
Dept: GASTROENTEROLOGY | Facility: CLINIC | Age: 76
End: 2021-01-22
Payer: MEDICARE

## 2021-01-22 VITALS
WEIGHT: 174.19 LBS | DIASTOLIC BLOOD PRESSURE: 77 MMHG | SYSTOLIC BLOOD PRESSURE: 123 MMHG | HEART RATE: 55 BPM | BODY MASS INDEX: 30.85 KG/M2

## 2021-01-22 DIAGNOSIS — Z12.11 SPECIAL SCREENING FOR MALIGNANT NEOPLASMS, COLON: Primary | ICD-10-CM

## 2021-01-22 DIAGNOSIS — R13.10 DYSPHAGIA, UNSPECIFIED TYPE: ICD-10-CM

## 2021-01-22 PROCEDURE — 3008F BODY MASS INDEX DOCD: CPT | Mod: HCNC,CPTII,S$GLB, | Performed by: INTERNAL MEDICINE

## 2021-01-22 PROCEDURE — 99204 OFFICE O/P NEW MOD 45 MIN: CPT | Mod: HCNC,S$GLB,, | Performed by: INTERNAL MEDICINE

## 2021-01-22 PROCEDURE — 99999 PR PBB SHADOW E&M-EST. PATIENT-LVL V: CPT | Mod: PBBFAC,HCNC,, | Performed by: INTERNAL MEDICINE

## 2021-01-22 PROCEDURE — 3288F FALL RISK ASSESSMENT DOCD: CPT | Mod: HCNC,CPTII,S$GLB, | Performed by: INTERNAL MEDICINE

## 2021-01-22 PROCEDURE — 99999 PR PBB SHADOW E&M-EST. PATIENT-LVL V: ICD-10-PCS | Mod: PBBFAC,HCNC,, | Performed by: INTERNAL MEDICINE

## 2021-01-22 PROCEDURE — 3008F PR BODY MASS INDEX (BMI) DOCUMENTED: ICD-10-PCS | Mod: HCNC,CPTII,S$GLB, | Performed by: INTERNAL MEDICINE

## 2021-01-22 PROCEDURE — 1159F MED LIST DOCD IN RCRD: CPT | Mod: HCNC,S$GLB,, | Performed by: INTERNAL MEDICINE

## 2021-01-22 PROCEDURE — 1126F PR PAIN SEVERITY QUANTIFIED, NO PAIN PRESENT: ICD-10-PCS | Mod: HCNC,S$GLB,, | Performed by: INTERNAL MEDICINE

## 2021-01-22 PROCEDURE — 3078F PR MOST RECENT DIASTOLIC BLOOD PRESSURE < 80 MM HG: ICD-10-PCS | Mod: HCNC,CPTII,S$GLB, | Performed by: INTERNAL MEDICINE

## 2021-01-22 PROCEDURE — 3288F PR FALLS RISK ASSESSMENT DOCUMENTED: ICD-10-PCS | Mod: HCNC,CPTII,S$GLB, | Performed by: INTERNAL MEDICINE

## 2021-01-22 PROCEDURE — 3078F DIAST BP <80 MM HG: CPT | Mod: HCNC,CPTII,S$GLB, | Performed by: INTERNAL MEDICINE

## 2021-01-22 PROCEDURE — 3074F PR MOST RECENT SYSTOLIC BLOOD PRESSURE < 130 MM HG: ICD-10-PCS | Mod: HCNC,CPTII,S$GLB, | Performed by: INTERNAL MEDICINE

## 2021-01-22 PROCEDURE — 1126F AMNT PAIN NOTED NONE PRSNT: CPT | Mod: HCNC,S$GLB,, | Performed by: INTERNAL MEDICINE

## 2021-01-22 PROCEDURE — 1101F PT FALLS ASSESS-DOCD LE1/YR: CPT | Mod: HCNC,CPTII,S$GLB, | Performed by: INTERNAL MEDICINE

## 2021-01-22 PROCEDURE — 1157F PR ADVANCE CARE PLAN OR EQUIV PRESENT IN MEDICAL RECORD: ICD-10-PCS | Mod: HCNC,S$GLB,, | Performed by: INTERNAL MEDICINE

## 2021-01-22 PROCEDURE — 1101F PR PT FALLS ASSESS DOC 0-1 FALLS W/OUT INJ PAST YR: ICD-10-PCS | Mod: HCNC,CPTII,S$GLB, | Performed by: INTERNAL MEDICINE

## 2021-01-22 PROCEDURE — 99204 PR OFFICE/OUTPT VISIT, NEW, LEVL IV, 45-59 MIN: ICD-10-PCS | Mod: HCNC,S$GLB,, | Performed by: INTERNAL MEDICINE

## 2021-01-22 PROCEDURE — 3074F SYST BP LT 130 MM HG: CPT | Mod: HCNC,CPTII,S$GLB, | Performed by: INTERNAL MEDICINE

## 2021-01-22 PROCEDURE — 1159F PR MEDICATION LIST DOCUMENTED IN MEDICAL RECORD: ICD-10-PCS | Mod: HCNC,S$GLB,, | Performed by: INTERNAL MEDICINE

## 2021-01-22 PROCEDURE — 1157F ADVNC CARE PLAN IN RCRD: CPT | Mod: HCNC,S$GLB,, | Performed by: INTERNAL MEDICINE

## 2021-01-22 RX ORDER — OMEPRAZOLE 40 MG/1
40 CAPSULE, DELAYED RELEASE ORAL DAILY
COMMUNITY
Start: 2021-01-04 | End: 2022-10-17

## 2021-01-24 ENCOUNTER — ANESTHESIA EVENT (OUTPATIENT)
Dept: ENDOSCOPY | Facility: HOSPITAL | Age: 76
End: 2021-01-24
Payer: MEDICARE

## 2021-01-25 ENCOUNTER — ANESTHESIA (OUTPATIENT)
Dept: ENDOSCOPY | Facility: HOSPITAL | Age: 76
End: 2021-01-25
Payer: MEDICARE

## 2021-01-25 ENCOUNTER — HOSPITAL ENCOUNTER (OUTPATIENT)
Facility: HOSPITAL | Age: 76
Discharge: HOME | End: 2021-01-25
Attending: INTERNAL MEDICINE | Admitting: INTERNAL MEDICINE
Payer: MEDICARE

## 2021-01-25 VITALS
HEART RATE: 58 BPM | DIASTOLIC BLOOD PRESSURE: 76 MMHG | OXYGEN SATURATION: 99 % | TEMPERATURE: 98 F | RESPIRATION RATE: 20 BRPM | SYSTOLIC BLOOD PRESSURE: 127 MMHG

## 2021-01-25 DIAGNOSIS — R13.10 DYSPHAGIA: ICD-10-CM

## 2021-01-25 LAB — SARS-COV-2 RDRP RESP QL NAA+PROBE: NEGATIVE

## 2021-01-25 PROCEDURE — 63600175 PHARM REV CODE 636 W HCPCS: Mod: HCNC | Performed by: NURSE ANESTHETIST, CERTIFIED REGISTERED

## 2021-01-25 PROCEDURE — 37000009 HC ANESTHESIA EA ADD 15 MINS: Mod: HCNC | Performed by: INTERNAL MEDICINE

## 2021-01-25 PROCEDURE — D9220A PRA ANESTHESIA: ICD-10-PCS | Mod: HCNC,CRNA,, | Performed by: NURSE ANESTHETIST, CERTIFIED REGISTERED

## 2021-01-25 PROCEDURE — U0002 COVID-19 LAB TEST NON-CDC: HCPCS | Mod: HCNC

## 2021-01-25 PROCEDURE — 43249 ESOPH EGD DILATION <30 MM: CPT | Mod: HCNC | Performed by: INTERNAL MEDICINE

## 2021-01-25 PROCEDURE — 25000003 PHARM REV CODE 250: Mod: HCNC | Performed by: NURSE ANESTHETIST, CERTIFIED REGISTERED

## 2021-01-25 PROCEDURE — 43249 PR EGD, FLEX, W/BALL DILATION, < 30MM: ICD-10-PCS | Mod: HCNC,,, | Performed by: INTERNAL MEDICINE

## 2021-01-25 PROCEDURE — 25000242 PHARM REV CODE 250 ALT 637 W/ HCPCS: Mod: HCNC | Performed by: ANESTHESIOLOGY

## 2021-01-25 PROCEDURE — 37000008 HC ANESTHESIA 1ST 15 MINUTES: Mod: HCNC | Performed by: INTERNAL MEDICINE

## 2021-01-25 PROCEDURE — 43249 ESOPH EGD DILATION <30 MM: CPT | Mod: HCNC,,, | Performed by: INTERNAL MEDICINE

## 2021-01-25 PROCEDURE — D9220A PRA ANESTHESIA: Mod: HCNC,ANES,, | Performed by: ANESTHESIOLOGY

## 2021-01-25 PROCEDURE — 25000003 PHARM REV CODE 250: Mod: HCNC | Performed by: ANESTHESIOLOGY

## 2021-01-25 PROCEDURE — D9220A PRA ANESTHESIA: ICD-10-PCS | Mod: HCNC,ANES,, | Performed by: ANESTHESIOLOGY

## 2021-01-25 PROCEDURE — D9220A PRA ANESTHESIA: Mod: HCNC,CRNA,, | Performed by: NURSE ANESTHETIST, CERTIFIED REGISTERED

## 2021-01-25 PROCEDURE — C1726 CATH, BAL DIL, NON-VASCULAR: HCPCS | Mod: HCNC | Performed by: INTERNAL MEDICINE

## 2021-01-25 RX ORDER — LIDOCAINE HYDROCHLORIDE 20 MG/ML
INJECTION, SOLUTION EPIDURAL; INFILTRATION; INTRACAUDAL; PERINEURAL
Status: DISCONTINUED
Start: 2021-01-25 | End: 2021-01-25 | Stop reason: HOSPADM

## 2021-01-25 RX ORDER — ALBUTEROL SULFATE 90 UG/1
AEROSOL, METERED RESPIRATORY (INHALATION)
Status: DISCONTINUED
Start: 2021-01-25 | End: 2021-01-25 | Stop reason: HOSPADM

## 2021-01-25 RX ORDER — LIDOCAINE HYDROCHLORIDE 10 MG/ML
1 INJECTION, SOLUTION EPIDURAL; INFILTRATION; INTRACAUDAL; PERINEURAL ONCE
Status: DISCONTINUED | OUTPATIENT
Start: 2021-01-25 | End: 2021-01-25 | Stop reason: HOSPADM

## 2021-01-25 RX ORDER — PROPOFOL 10 MG/ML
INJECTION, EMULSION INTRAVENOUS
Status: COMPLETED
Start: 2021-01-25 | End: 2021-01-25

## 2021-01-25 RX ORDER — LIDOCAINE HCL/PF 100 MG/5ML
SYRINGE (ML) INTRAVENOUS
Status: DISCONTINUED | OUTPATIENT
Start: 2021-01-25 | End: 2021-01-25

## 2021-01-25 RX ORDER — SODIUM CHLORIDE 9 MG/ML
INJECTION, SOLUTION INTRAVENOUS CONTINUOUS
Status: DISCONTINUED | OUTPATIENT
Start: 2021-01-25 | End: 2021-01-25 | Stop reason: HOSPADM

## 2021-01-25 RX ORDER — PROPOFOL 10 MG/ML
INJECTION, EMULSION INTRAVENOUS
Status: DISCONTINUED
Start: 2021-01-25 | End: 2021-01-25 | Stop reason: HOSPADM

## 2021-01-25 RX ORDER — ALBUTEROL SULFATE 90 UG/1
AEROSOL, METERED RESPIRATORY (INHALATION)
Status: DISCONTINUED | OUTPATIENT
Start: 2021-01-25 | End: 2021-01-25

## 2021-01-25 RX ORDER — PROPOFOL 10 MG/ML
VIAL (ML) INTRAVENOUS
Status: DISCONTINUED | OUTPATIENT
Start: 2021-01-25 | End: 2021-01-25

## 2021-01-25 RX ADMIN — ALBUTEROL SULFATE 2 PUFF: 90 AEROSOL, METERED RESPIRATORY (INHALATION) at 08:01

## 2021-01-25 RX ADMIN — PROPOFOL 20 MG: 10 INJECTION, EMULSION INTRAVENOUS at 09:01

## 2021-01-25 RX ADMIN — SODIUM CHLORIDE: 0.9 INJECTION, SOLUTION INTRAVENOUS at 09:01

## 2021-01-25 RX ADMIN — LIDOCAINE HYDROCHLORIDE 80 MG: 20 INJECTION, SOLUTION INTRAVENOUS at 09:01

## 2021-01-25 RX ADMIN — PROPOFOL 10 MG: 10 INJECTION, EMULSION INTRAVENOUS at 09:01

## 2021-01-25 RX ADMIN — PROPOFOL 100 MG: 10 INJECTION, EMULSION INTRAVENOUS at 09:01

## 2021-02-09 ENCOUNTER — INFUSION (OUTPATIENT)
Dept: INFUSION THERAPY | Facility: HOSPITAL | Age: 76
End: 2021-02-09
Attending: INTERNAL MEDICINE
Payer: MEDICARE

## 2021-02-09 ENCOUNTER — OFFICE VISIT (OUTPATIENT)
Dept: HEMATOLOGY/ONCOLOGY | Facility: CLINIC | Age: 76
End: 2021-02-09
Payer: MEDICARE

## 2021-02-09 VITALS
HEART RATE: 55 BPM | SYSTOLIC BLOOD PRESSURE: 144 MMHG | OXYGEN SATURATION: 95 % | TEMPERATURE: 98 F | WEIGHT: 173.94 LBS | DIASTOLIC BLOOD PRESSURE: 71 MMHG | HEIGHT: 63 IN | BODY MASS INDEX: 30.82 KG/M2

## 2021-02-09 VITALS
SYSTOLIC BLOOD PRESSURE: 131 MMHG | OXYGEN SATURATION: 96 % | DIASTOLIC BLOOD PRESSURE: 64 MMHG | RESPIRATION RATE: 17 BRPM | HEART RATE: 57 BPM | TEMPERATURE: 98 F

## 2021-02-09 DIAGNOSIS — C34.91 SQUAMOUS CELL CARCINOMA OF RIGHT LUNG: ICD-10-CM

## 2021-02-09 DIAGNOSIS — C34.90 SQUAMOUS CELL CARCINOMA OF LUNG, UNSPECIFIED LATERALITY: Primary | ICD-10-CM

## 2021-02-09 DIAGNOSIS — C50.919 RECURRENT BREAST CANCER, UNSPECIFIED LATERALITY: Primary | ICD-10-CM

## 2021-02-09 DIAGNOSIS — C34.90 SQUAMOUS CELL CARCINOMA LUNG: ICD-10-CM

## 2021-02-09 DIAGNOSIS — R13.10 DYSPHAGIA, UNSPECIFIED TYPE: ICD-10-CM

## 2021-02-09 LAB
ALBUMIN SERPL BCP-MCNC: 4 G/DL (ref 3.5–5.2)
ALP SERPL-CCNC: 50 U/L (ref 55–135)
ALT SERPL W/O P-5'-P-CCNC: 15 U/L (ref 10–44)
ANION GAP SERPL CALC-SCNC: 12 MMOL/L (ref 8–16)
AST SERPL-CCNC: 17 U/L (ref 10–40)
BILIRUB SERPL-MCNC: 0.5 MG/DL (ref 0.1–1)
BUN SERPL-MCNC: 13 MG/DL (ref 8–23)
CALCIUM SERPL-MCNC: 8.7 MG/DL (ref 8.7–10.5)
CHLORIDE SERPL-SCNC: 101 MMOL/L (ref 95–110)
CO2 SERPL-SCNC: 26 MMOL/L (ref 23–29)
CREAT SERPL-MCNC: 0.8 MG/DL (ref 0.5–1.4)
ERYTHROCYTE [DISTWIDTH] IN BLOOD BY AUTOMATED COUNT: 13.7 % (ref 11.5–14.5)
EST. GFR  (AFRICAN AMERICAN): >60 ML/MIN/1.73 M^2
EST. GFR  (NON AFRICAN AMERICAN): >60 ML/MIN/1.73 M^2
GLUCOSE SERPL-MCNC: 140 MG/DL (ref 70–110)
HCT VFR BLD AUTO: 39.4 % (ref 37–48.5)
HGB BLD-MCNC: 13.1 G/DL (ref 12–16)
IMM GRANULOCYTES # BLD AUTO: 0.02 K/UL (ref 0–0.04)
MCH RBC QN AUTO: 30.5 PG (ref 27–31)
MCHC RBC AUTO-ENTMCNC: 33.2 G/DL (ref 32–36)
MCV RBC AUTO: 92 FL (ref 82–98)
NEUTROPHILS # BLD AUTO: 2.3 K/UL (ref 1.8–7.7)
PLATELET # BLD AUTO: 199 K/UL (ref 150–350)
PMV BLD AUTO: 9.9 FL (ref 9.2–12.9)
POTASSIUM SERPL-SCNC: 3.4 MMOL/L (ref 3.5–5.1)
PROT SERPL-MCNC: 6.6 G/DL (ref 6–8.4)
RBC # BLD AUTO: 4.29 M/UL (ref 4–5.4)
SODIUM SERPL-SCNC: 139 MMOL/L (ref 136–145)
WBC # BLD AUTO: 3.93 K/UL (ref 3.9–12.7)

## 2021-02-09 PROCEDURE — 1157F PR ADVANCE CARE PLAN OR EQUIV PRESENT IN MEDICAL RECORD: ICD-10-PCS | Mod: S$GLB,,, | Performed by: INTERNAL MEDICINE

## 2021-02-09 PROCEDURE — 3008F PR BODY MASS INDEX (BMI) DOCUMENTED: ICD-10-PCS | Mod: CPTII,S$GLB,, | Performed by: INTERNAL MEDICINE

## 2021-02-09 PROCEDURE — 85027 COMPLETE CBC AUTOMATED: CPT

## 2021-02-09 PROCEDURE — 99214 PR OFFICE/OUTPT VISIT, EST, LEVL IV, 30-39 MIN: ICD-10-PCS | Mod: S$GLB,,, | Performed by: INTERNAL MEDICINE

## 2021-02-09 PROCEDURE — 99499 UNLISTED E&M SERVICE: CPT | Mod: S$GLB,,, | Performed by: INTERNAL MEDICINE

## 2021-02-09 PROCEDURE — 1157F ADVNC CARE PLAN IN RCRD: CPT | Mod: S$GLB,,, | Performed by: INTERNAL MEDICINE

## 2021-02-09 PROCEDURE — 3074F SYST BP LT 130 MM HG: CPT | Mod: CPTII,S$GLB,, | Performed by: INTERNAL MEDICINE

## 2021-02-09 PROCEDURE — 1125F PR PAIN SEVERITY QUANTIFIED, PAIN PRESENT: ICD-10-PCS | Mod: S$GLB,,, | Performed by: INTERNAL MEDICINE

## 2021-02-09 PROCEDURE — 80053 COMPREHEN METABOLIC PANEL: CPT

## 2021-02-09 PROCEDURE — 99499 RISK ADDL DX/OHS AUDIT: ICD-10-PCS | Mod: S$GLB,,, | Performed by: INTERNAL MEDICINE

## 2021-02-09 PROCEDURE — 1101F PT FALLS ASSESS-DOCD LE1/YR: CPT | Mod: CPTII,S$GLB,, | Performed by: INTERNAL MEDICINE

## 2021-02-09 PROCEDURE — 3078F DIAST BP <80 MM HG: CPT | Mod: CPTII,S$GLB,, | Performed by: INTERNAL MEDICINE

## 2021-02-09 PROCEDURE — 99999 PR PBB SHADOW E&M-EST. PATIENT-LVL IV: ICD-10-PCS | Mod: PBBFAC,,, | Performed by: INTERNAL MEDICINE

## 2021-02-09 PROCEDURE — 1159F PR MEDICATION LIST DOCUMENTED IN MEDICAL RECORD: ICD-10-PCS | Mod: S$GLB,,, | Performed by: INTERNAL MEDICINE

## 2021-02-09 PROCEDURE — 3288F FALL RISK ASSESSMENT DOCD: CPT | Mod: CPTII,S$GLB,, | Performed by: INTERNAL MEDICINE

## 2021-02-09 PROCEDURE — 1101F PR PT FALLS ASSESS DOC 0-1 FALLS W/OUT INJ PAST YR: ICD-10-PCS | Mod: CPTII,S$GLB,, | Performed by: INTERNAL MEDICINE

## 2021-02-09 PROCEDURE — A4216 STERILE WATER/SALINE, 10 ML: HCPCS | Performed by: INTERNAL MEDICINE

## 2021-02-09 PROCEDURE — 1125F AMNT PAIN NOTED PAIN PRSNT: CPT | Mod: S$GLB,,, | Performed by: INTERNAL MEDICINE

## 2021-02-09 PROCEDURE — 63600175 PHARM REV CODE 636 W HCPCS: Performed by: INTERNAL MEDICINE

## 2021-02-09 PROCEDURE — 3078F PR MOST RECENT DIASTOLIC BLOOD PRESSURE < 80 MM HG: ICD-10-PCS | Mod: CPTII,S$GLB,, | Performed by: INTERNAL MEDICINE

## 2021-02-09 PROCEDURE — 36592 COLLECT BLOOD FROM PICC: CPT

## 2021-02-09 PROCEDURE — 1159F MED LIST DOCD IN RCRD: CPT | Mod: S$GLB,,, | Performed by: INTERNAL MEDICINE

## 2021-02-09 PROCEDURE — 25000003 PHARM REV CODE 250: Performed by: INTERNAL MEDICINE

## 2021-02-09 PROCEDURE — 3288F PR FALLS RISK ASSESSMENT DOCUMENTED: ICD-10-PCS | Mod: CPTII,S$GLB,, | Performed by: INTERNAL MEDICINE

## 2021-02-09 PROCEDURE — 3008F BODY MASS INDEX DOCD: CPT | Mod: CPTII,S$GLB,, | Performed by: INTERNAL MEDICINE

## 2021-02-09 PROCEDURE — 99999 PR PBB SHADOW E&M-EST. PATIENT-LVL IV: CPT | Mod: PBBFAC,,, | Performed by: INTERNAL MEDICINE

## 2021-02-09 PROCEDURE — 99214 OFFICE O/P EST MOD 30 MIN: CPT | Mod: S$GLB,,, | Performed by: INTERNAL MEDICINE

## 2021-02-09 PROCEDURE — 3074F PR MOST RECENT SYSTOLIC BLOOD PRESSURE < 130 MM HG: ICD-10-PCS | Mod: CPTII,S$GLB,, | Performed by: INTERNAL MEDICINE

## 2021-02-09 RX ORDER — SODIUM CHLORIDE 0.9 % (FLUSH) 0.9 %
10 SYRINGE (ML) INJECTION
Status: DISCONTINUED | OUTPATIENT
Start: 2021-02-09 | End: 2021-02-09 | Stop reason: HOSPADM

## 2021-02-09 RX ORDER — HEPARIN 100 UNIT/ML
500 SYRINGE INTRAVENOUS
Status: DISCONTINUED | OUTPATIENT
Start: 2021-02-09 | End: 2021-02-09 | Stop reason: HOSPADM

## 2021-02-09 RX ADMIN — HEPARIN 500 UNITS: 100 SYRINGE at 08:02

## 2021-02-09 RX ADMIN — Medication 10 ML: at 08:02

## 2021-02-23 ENCOUNTER — TELEPHONE (OUTPATIENT)
Dept: HEMATOLOGY/ONCOLOGY | Facility: CLINIC | Age: 76
End: 2021-02-23

## 2021-02-25 ENCOUNTER — PES CALL (OUTPATIENT)
Dept: ADMINISTRATIVE | Facility: CLINIC | Age: 76
End: 2021-02-25

## 2021-03-01 ENCOUNTER — TELEPHONE (OUTPATIENT)
Dept: HEMATOLOGY/ONCOLOGY | Facility: CLINIC | Age: 76
End: 2021-03-01

## 2021-03-08 ENCOUNTER — TELEPHONE (OUTPATIENT)
Dept: HEMATOLOGY/ONCOLOGY | Facility: CLINIC | Age: 76
End: 2021-03-08

## 2021-03-09 ENCOUNTER — PES CALL (OUTPATIENT)
Dept: ADMINISTRATIVE | Facility: CLINIC | Age: 76
End: 2021-03-09

## 2021-03-29 ENCOUNTER — TELEPHONE (OUTPATIENT)
Dept: HEMATOLOGY/ONCOLOGY | Facility: CLINIC | Age: 76
End: 2021-03-29

## 2021-04-01 ENCOUNTER — TELEPHONE (OUTPATIENT)
Dept: ADMINISTRATIVE | Facility: CLINIC | Age: 76
End: 2021-04-01

## 2021-04-05 ENCOUNTER — OFFICE VISIT (OUTPATIENT)
Dept: FAMILY MEDICINE | Facility: CLINIC | Age: 76
End: 2021-04-05
Payer: MEDICARE

## 2021-04-05 ENCOUNTER — INFUSION (OUTPATIENT)
Dept: INFUSION THERAPY | Facility: HOSPITAL | Age: 76
End: 2021-04-05
Attending: INTERNAL MEDICINE
Payer: MEDICARE

## 2021-04-05 VITALS
WEIGHT: 177.69 LBS | SYSTOLIC BLOOD PRESSURE: 122 MMHG | BODY MASS INDEX: 31.48 KG/M2 | TEMPERATURE: 98 F | OXYGEN SATURATION: 96 % | HEART RATE: 53 BPM | DIASTOLIC BLOOD PRESSURE: 72 MMHG

## 2021-04-05 DIAGNOSIS — R73.03 PREDIABETES: ICD-10-CM

## 2021-04-05 DIAGNOSIS — Z00.00 ENCOUNTER FOR PREVENTIVE HEALTH EXAMINATION: Primary | ICD-10-CM

## 2021-04-05 DIAGNOSIS — C50.919 METASTATIC BREAST CANCER: Chronic | ICD-10-CM

## 2021-04-05 DIAGNOSIS — C34.90 SQUAMOUS CELL CARCINOMA LUNG: Primary | ICD-10-CM

## 2021-04-05 DIAGNOSIS — R13.10 DYSPHAGIA, UNSPECIFIED TYPE: ICD-10-CM

## 2021-04-05 DIAGNOSIS — I25.2 OLD MYOCARDIAL INFARCTION: ICD-10-CM

## 2021-04-05 DIAGNOSIS — I20.89 STABLE ANGINA: Chronic | ICD-10-CM

## 2021-04-05 DIAGNOSIS — C34.90 SQUAMOUS CELL CARCINOMA OF LUNG, UNSPECIFIED LATERALITY: Chronic | ICD-10-CM

## 2021-04-05 DIAGNOSIS — I11.9 BENIGN HYPERTENSIVE HEART DISEASE WITHOUT HEART FAILURE: Chronic | ICD-10-CM

## 2021-04-05 DIAGNOSIS — I50.32 CHRONIC HEART FAILURE WITH PRESERVED EJECTION FRACTION: ICD-10-CM

## 2021-04-05 DIAGNOSIS — I70.0 AORTIC ARCH ATHEROSCLEROSIS: ICD-10-CM

## 2021-04-05 DIAGNOSIS — R05.3 CHRONIC COUGH: ICD-10-CM

## 2021-04-05 DIAGNOSIS — E78.5 HYPERLIPIDEMIA LDL GOAL <70: Chronic | ICD-10-CM

## 2021-04-05 DIAGNOSIS — Z99.81 DEPENDENCE ON SUPPLEMENTAL OXYGEN: ICD-10-CM

## 2021-04-05 DIAGNOSIS — I70.0 ATHEROSCLEROSIS OF AORTA: Chronic | ICD-10-CM

## 2021-04-05 DIAGNOSIS — J44.9 CHRONIC OBSTRUCTIVE PULMONARY DISEASE, UNSPECIFIED COPD TYPE: Chronic | ICD-10-CM

## 2021-04-05 DIAGNOSIS — I25.10 CORONARY ARTERY DISEASE INVOLVING NATIVE CORONARY ARTERY OF NATIVE HEART WITHOUT ANGINA PECTORIS: Chronic | ICD-10-CM

## 2021-04-05 PROBLEM — Z85.118 HISTORY OF LUNG CANCER: Status: RESOLVED | Noted: 2018-05-07 | Resolved: 2021-04-05

## 2021-04-05 PROBLEM — R94.39 ABNORMAL STRESS TEST: Status: RESOLVED | Noted: 2020-08-05 | Resolved: 2021-04-05

## 2021-04-05 PROCEDURE — 1125F PR PAIN SEVERITY QUANTIFIED, PAIN PRESENT: ICD-10-PCS | Mod: S$GLB,,, | Performed by: PHYSICIAN ASSISTANT

## 2021-04-05 PROCEDURE — 3288F PR FALLS RISK ASSESSMENT DOCUMENTED: ICD-10-PCS | Mod: CPTII,S$GLB,, | Performed by: PHYSICIAN ASSISTANT

## 2021-04-05 PROCEDURE — 3008F BODY MASS INDEX DOCD: CPT | Mod: CPTII,S$GLB,, | Performed by: PHYSICIAN ASSISTANT

## 2021-04-05 PROCEDURE — 63600175 PHARM REV CODE 636 W HCPCS: Performed by: INTERNAL MEDICINE

## 2021-04-05 PROCEDURE — 3078F PR MOST RECENT DIASTOLIC BLOOD PRESSURE < 80 MM HG: ICD-10-PCS | Mod: CPTII,S$GLB,, | Performed by: PHYSICIAN ASSISTANT

## 2021-04-05 PROCEDURE — 3078F DIAST BP <80 MM HG: CPT | Mod: CPTII,S$GLB,, | Performed by: PHYSICIAN ASSISTANT

## 2021-04-05 PROCEDURE — 99499 UNLISTED E&M SERVICE: CPT | Mod: HCNC,S$GLB,, | Performed by: PHYSICIAN ASSISTANT

## 2021-04-05 PROCEDURE — 3074F PR MOST RECENT SYSTOLIC BLOOD PRESSURE < 130 MM HG: ICD-10-PCS | Mod: CPTII,S$GLB,, | Performed by: PHYSICIAN ASSISTANT

## 2021-04-05 PROCEDURE — 99999 PR PBB SHADOW E&M-EST. PATIENT-LVL IV: CPT | Mod: PBBFAC,,, | Performed by: PHYSICIAN ASSISTANT

## 2021-04-05 PROCEDURE — G0439 PR MEDICARE ANNUAL WELLNESS SUBSEQUENT VISIT: ICD-10-PCS | Mod: S$GLB,,, | Performed by: PHYSICIAN ASSISTANT

## 2021-04-05 PROCEDURE — 99499 RISK ADDL DX/OHS AUDIT: ICD-10-PCS | Mod: HCNC,S$GLB,, | Performed by: PHYSICIAN ASSISTANT

## 2021-04-05 PROCEDURE — 1101F PT FALLS ASSESS-DOCD LE1/YR: CPT | Mod: CPTII,S$GLB,, | Performed by: PHYSICIAN ASSISTANT

## 2021-04-05 PROCEDURE — 1157F PR ADVANCE CARE PLAN OR EQUIV PRESENT IN MEDICAL RECORD: ICD-10-PCS | Mod: S$GLB,,, | Performed by: PHYSICIAN ASSISTANT

## 2021-04-05 PROCEDURE — A4216 STERILE WATER/SALINE, 10 ML: HCPCS | Performed by: INTERNAL MEDICINE

## 2021-04-05 PROCEDURE — 3074F SYST BP LT 130 MM HG: CPT | Mod: CPTII,S$GLB,, | Performed by: PHYSICIAN ASSISTANT

## 2021-04-05 PROCEDURE — 96523 IRRIG DRUG DELIVERY DEVICE: CPT

## 2021-04-05 PROCEDURE — 3288F FALL RISK ASSESSMENT DOCD: CPT | Mod: CPTII,S$GLB,, | Performed by: PHYSICIAN ASSISTANT

## 2021-04-05 PROCEDURE — 99999 PR PBB SHADOW E&M-EST. PATIENT-LVL IV: ICD-10-PCS | Mod: PBBFAC,,, | Performed by: PHYSICIAN ASSISTANT

## 2021-04-05 PROCEDURE — 25000003 PHARM REV CODE 250: Performed by: INTERNAL MEDICINE

## 2021-04-05 PROCEDURE — G0439 PPPS, SUBSEQ VISIT: HCPCS | Mod: S$GLB,,, | Performed by: PHYSICIAN ASSISTANT

## 2021-04-05 PROCEDURE — 1125F AMNT PAIN NOTED PAIN PRSNT: CPT | Mod: S$GLB,,, | Performed by: PHYSICIAN ASSISTANT

## 2021-04-05 PROCEDURE — 3008F PR BODY MASS INDEX (BMI) DOCUMENTED: ICD-10-PCS | Mod: CPTII,S$GLB,, | Performed by: PHYSICIAN ASSISTANT

## 2021-04-05 PROCEDURE — 1157F ADVNC CARE PLAN IN RCRD: CPT | Mod: S$GLB,,, | Performed by: PHYSICIAN ASSISTANT

## 2021-04-05 PROCEDURE — 1101F PR PT FALLS ASSESS DOC 0-1 FALLS W/OUT INJ PAST YR: ICD-10-PCS | Mod: CPTII,S$GLB,, | Performed by: PHYSICIAN ASSISTANT

## 2021-04-05 RX ORDER — MONTELUKAST SODIUM 10 MG/1
10 TABLET ORAL NIGHTLY
Qty: 30 TABLET | Refills: 2 | Status: SHIPPED | OUTPATIENT
Start: 2021-04-05 | End: 2021-07-29

## 2021-04-05 RX ORDER — SODIUM CHLORIDE 0.9 % (FLUSH) 0.9 %
10 SYRINGE (ML) INJECTION
Status: DISCONTINUED | OUTPATIENT
Start: 2021-04-05 | End: 2021-04-05 | Stop reason: HOSPADM

## 2021-04-05 RX ORDER — HEPARIN 100 UNIT/ML
500 SYRINGE INTRAVENOUS
Status: DISCONTINUED | OUTPATIENT
Start: 2021-04-05 | End: 2021-04-05 | Stop reason: HOSPADM

## 2021-04-05 RX ADMIN — Medication 10 ML: at 08:04

## 2021-04-05 RX ADMIN — HEPARIN SODIUM (PORCINE) LOCK FLUSH IV SOLN 100 UNIT/ML 500 UNITS: 100 SOLUTION at 08:04

## 2021-04-14 ENCOUNTER — OFFICE VISIT (OUTPATIENT)
Dept: CARDIOLOGY | Facility: CLINIC | Age: 76
End: 2021-04-14
Payer: MEDICARE

## 2021-04-14 VITALS
DIASTOLIC BLOOD PRESSURE: 78 MMHG | BODY MASS INDEX: 31.54 KG/M2 | HEIGHT: 63 IN | OXYGEN SATURATION: 97 % | SYSTOLIC BLOOD PRESSURE: 132 MMHG | WEIGHT: 178 LBS | HEART RATE: 54 BPM

## 2021-04-14 DIAGNOSIS — R06.09 DOE (DYSPNEA ON EXERTION): ICD-10-CM

## 2021-04-14 DIAGNOSIS — I25.2 OLD MYOCARDIAL INFARCTION: ICD-10-CM

## 2021-04-14 DIAGNOSIS — I20.89 STABLE ANGINA: ICD-10-CM

## 2021-04-14 DIAGNOSIS — I50.32 CHRONIC HEART FAILURE WITH PRESERVED EJECTION FRACTION: Primary | ICD-10-CM

## 2021-04-14 DIAGNOSIS — I25.10 CORONARY ARTERY DISEASE INVOLVING NATIVE CORONARY ARTERY OF NATIVE HEART WITHOUT ANGINA PECTORIS: ICD-10-CM

## 2021-04-14 DIAGNOSIS — I70.0 AORTIC ARCH ATHEROSCLEROSIS: ICD-10-CM

## 2021-04-14 DIAGNOSIS — J44.9 CHRONIC OBSTRUCTIVE PULMONARY DISEASE, UNSPECIFIED COPD TYPE: ICD-10-CM

## 2021-04-14 DIAGNOSIS — C34.90 SQUAMOUS CELL CARCINOMA OF LUNG, UNSPECIFIED LATERALITY: ICD-10-CM

## 2021-04-14 DIAGNOSIS — I70.0 ATHEROSCLEROSIS OF AORTA: ICD-10-CM

## 2021-04-14 DIAGNOSIS — E78.5 HYPERLIPIDEMIA LDL GOAL <70: ICD-10-CM

## 2021-04-14 DIAGNOSIS — R13.10 DYSPHAGIA, UNSPECIFIED TYPE: ICD-10-CM

## 2021-04-14 DIAGNOSIS — C50.919 METASTATIC BREAST CANCER: ICD-10-CM

## 2021-04-14 PROCEDURE — 1126F PR PAIN SEVERITY QUANTIFIED, NO PAIN PRESENT: ICD-10-PCS | Mod: S$GLB,,, | Performed by: INTERNAL MEDICINE

## 2021-04-14 PROCEDURE — 3288F PR FALLS RISK ASSESSMENT DOCUMENTED: ICD-10-PCS | Mod: CPTII,S$GLB,, | Performed by: INTERNAL MEDICINE

## 2021-04-14 PROCEDURE — 93000 ELECTROCARDIOGRAM COMPLETE: CPT | Mod: S$GLB,,, | Performed by: INTERNAL MEDICINE

## 2021-04-14 PROCEDURE — 99214 OFFICE O/P EST MOD 30 MIN: CPT | Mod: S$GLB,,, | Performed by: INTERNAL MEDICINE

## 2021-04-14 PROCEDURE — 1101F PR PT FALLS ASSESS DOC 0-1 FALLS W/OUT INJ PAST YR: ICD-10-PCS | Mod: CPTII,S$GLB,, | Performed by: INTERNAL MEDICINE

## 2021-04-14 PROCEDURE — 1157F ADVNC CARE PLAN IN RCRD: CPT | Mod: S$GLB,,, | Performed by: INTERNAL MEDICINE

## 2021-04-14 PROCEDURE — 3008F PR BODY MASS INDEX (BMI) DOCUMENTED: ICD-10-PCS | Mod: CPTII,S$GLB,, | Performed by: INTERNAL MEDICINE

## 2021-04-14 PROCEDURE — 1159F PR MEDICATION LIST DOCUMENTED IN MEDICAL RECORD: ICD-10-PCS | Mod: S$GLB,,, | Performed by: INTERNAL MEDICINE

## 2021-04-14 PROCEDURE — 99999 PR PBB SHADOW E&M-EST. PATIENT-LVL III: ICD-10-PCS | Mod: PBBFAC,,, | Performed by: INTERNAL MEDICINE

## 2021-04-14 PROCEDURE — 3008F BODY MASS INDEX DOCD: CPT | Mod: CPTII,S$GLB,, | Performed by: INTERNAL MEDICINE

## 2021-04-14 PROCEDURE — 99999 PR PBB SHADOW E&M-EST. PATIENT-LVL III: CPT | Mod: PBBFAC,,, | Performed by: INTERNAL MEDICINE

## 2021-04-14 PROCEDURE — 1159F MED LIST DOCD IN RCRD: CPT | Mod: S$GLB,,, | Performed by: INTERNAL MEDICINE

## 2021-04-14 PROCEDURE — 3288F FALL RISK ASSESSMENT DOCD: CPT | Mod: CPTII,S$GLB,, | Performed by: INTERNAL MEDICINE

## 2021-04-14 PROCEDURE — 1157F PR ADVANCE CARE PLAN OR EQUIV PRESENT IN MEDICAL RECORD: ICD-10-PCS | Mod: S$GLB,,, | Performed by: INTERNAL MEDICINE

## 2021-04-14 PROCEDURE — 93000 EKG 12-LEAD: ICD-10-PCS | Mod: S$GLB,,, | Performed by: INTERNAL MEDICINE

## 2021-04-14 PROCEDURE — 99214 PR OFFICE/OUTPT VISIT, EST, LEVL IV, 30-39 MIN: ICD-10-PCS | Mod: S$GLB,,, | Performed by: INTERNAL MEDICINE

## 2021-04-14 PROCEDURE — 1126F AMNT PAIN NOTED NONE PRSNT: CPT | Mod: S$GLB,,, | Performed by: INTERNAL MEDICINE

## 2021-04-14 PROCEDURE — 1101F PT FALLS ASSESS-DOCD LE1/YR: CPT | Mod: CPTII,S$GLB,, | Performed by: INTERNAL MEDICINE

## 2021-04-27 ENCOUNTER — OFFICE VISIT (OUTPATIENT)
Dept: HEMATOLOGY/ONCOLOGY | Facility: CLINIC | Age: 76
End: 2021-04-27
Payer: MEDICARE

## 2021-04-27 ENCOUNTER — HOSPITAL ENCOUNTER (OUTPATIENT)
Dept: RADIOLOGY | Facility: HOSPITAL | Age: 76
Discharge: HOME | End: 2021-04-27
Attending: INTERNAL MEDICINE
Payer: MEDICARE

## 2021-04-27 VITALS
HEIGHT: 63 IN | DIASTOLIC BLOOD PRESSURE: 81 MMHG | WEIGHT: 178.81 LBS | SYSTOLIC BLOOD PRESSURE: 160 MMHG | HEART RATE: 59 BPM | TEMPERATURE: 98 F | OXYGEN SATURATION: 96 % | BODY MASS INDEX: 31.68 KG/M2

## 2021-04-27 DIAGNOSIS — C34.90 SQUAMOUS CELL CARCINOMA OF LUNG, UNSPECIFIED LATERALITY: ICD-10-CM

## 2021-04-27 DIAGNOSIS — C50.919 RECURRENT BREAST CANCER, UNSPECIFIED LATERALITY: Primary | ICD-10-CM

## 2021-04-27 DIAGNOSIS — Z17.1 MALIGNANT NEOPLASM OF CENTRAL PORTION OF RIGHT BREAST IN FEMALE, ESTROGEN RECEPTOR NEGATIVE: ICD-10-CM

## 2021-04-27 DIAGNOSIS — C50.111 MALIGNANT NEOPLASM OF CENTRAL PORTION OF RIGHT BREAST IN FEMALE, ESTROGEN RECEPTOR NEGATIVE: ICD-10-CM

## 2021-04-27 DIAGNOSIS — C50.919 METASTATIC BREAST CANCER: ICD-10-CM

## 2021-04-27 DIAGNOSIS — R59.0 AXILLARY LYMPHADENOPATHY: ICD-10-CM

## 2021-04-27 DIAGNOSIS — R94.8 ABNORMAL POSITRON EMISSION TOMOGRAPHY (PET) SCAN: ICD-10-CM

## 2021-04-27 DIAGNOSIS — M25.511 RIGHT SHOULDER PAIN, UNSPECIFIED CHRONICITY: ICD-10-CM

## 2021-04-27 DIAGNOSIS — R51.9 FREQUENT HEADACHES: ICD-10-CM

## 2021-04-27 DIAGNOSIS — J44.9 CHRONIC OBSTRUCTIVE PULMONARY DISEASE, UNSPECIFIED COPD TYPE: ICD-10-CM

## 2021-04-27 PROCEDURE — 71046 X-RAY EXAM CHEST 2 VIEWS: CPT | Mod: TC,FY

## 2021-04-27 PROCEDURE — 3288F PR FALLS RISK ASSESSMENT DOCUMENTED: ICD-10-PCS | Mod: CPTII,S$GLB,, | Performed by: INTERNAL MEDICINE

## 2021-04-27 PROCEDURE — 71046 X-RAY EXAM CHEST 2 VIEWS: CPT | Mod: 26,,, | Performed by: RADIOLOGY

## 2021-04-27 PROCEDURE — 1159F PR MEDICATION LIST DOCUMENTED IN MEDICAL RECORD: ICD-10-PCS | Mod: S$GLB,,, | Performed by: INTERNAL MEDICINE

## 2021-04-27 PROCEDURE — 1101F PT FALLS ASSESS-DOCD LE1/YR: CPT | Mod: CPTII,S$GLB,, | Performed by: INTERNAL MEDICINE

## 2021-04-27 PROCEDURE — 71046 XR CHEST PA AND LATERAL: ICD-10-PCS | Mod: 26,,, | Performed by: RADIOLOGY

## 2021-04-27 PROCEDURE — 1157F PR ADVANCE CARE PLAN OR EQUIV PRESENT IN MEDICAL RECORD: ICD-10-PCS | Mod: S$GLB,,, | Performed by: INTERNAL MEDICINE

## 2021-04-27 PROCEDURE — 99215 OFFICE O/P EST HI 40 MIN: CPT | Mod: S$GLB,,, | Performed by: INTERNAL MEDICINE

## 2021-04-27 PROCEDURE — 99499 UNLISTED E&M SERVICE: CPT | Mod: HCNC,S$GLB,, | Performed by: INTERNAL MEDICINE

## 2021-04-27 PROCEDURE — 1159F MED LIST DOCD IN RCRD: CPT | Mod: S$GLB,,, | Performed by: INTERNAL MEDICINE

## 2021-04-27 PROCEDURE — 1125F AMNT PAIN NOTED PAIN PRSNT: CPT | Mod: S$GLB,,, | Performed by: INTERNAL MEDICINE

## 2021-04-27 PROCEDURE — 1125F PR PAIN SEVERITY QUANTIFIED, PAIN PRESENT: ICD-10-PCS | Mod: S$GLB,,, | Performed by: INTERNAL MEDICINE

## 2021-04-27 PROCEDURE — 99499 RISK ADDL DX/OHS AUDIT: ICD-10-PCS | Mod: HCNC,S$GLB,, | Performed by: INTERNAL MEDICINE

## 2021-04-27 PROCEDURE — 99999 PR PBB SHADOW E&M-EST. PATIENT-LVL V: CPT | Mod: PBBFAC,,, | Performed by: INTERNAL MEDICINE

## 2021-04-27 PROCEDURE — 1157F ADVNC CARE PLAN IN RCRD: CPT | Mod: S$GLB,,, | Performed by: INTERNAL MEDICINE

## 2021-04-27 PROCEDURE — 3288F FALL RISK ASSESSMENT DOCD: CPT | Mod: CPTII,S$GLB,, | Performed by: INTERNAL MEDICINE

## 2021-04-27 PROCEDURE — 3008F BODY MASS INDEX DOCD: CPT | Mod: CPTII,S$GLB,, | Performed by: INTERNAL MEDICINE

## 2021-04-27 PROCEDURE — 1101F PR PT FALLS ASSESS DOC 0-1 FALLS W/OUT INJ PAST YR: ICD-10-PCS | Mod: CPTII,S$GLB,, | Performed by: INTERNAL MEDICINE

## 2021-04-27 PROCEDURE — 99999 PR PBB SHADOW E&M-EST. PATIENT-LVL V: ICD-10-PCS | Mod: PBBFAC,,, | Performed by: INTERNAL MEDICINE

## 2021-04-27 PROCEDURE — 99215 PR OFFICE/OUTPT VISIT, EST, LEVL V, 40-54 MIN: ICD-10-PCS | Mod: S$GLB,,, | Performed by: INTERNAL MEDICINE

## 2021-04-27 PROCEDURE — 3008F PR BODY MASS INDEX (BMI) DOCUMENTED: ICD-10-PCS | Mod: CPTII,S$GLB,, | Performed by: INTERNAL MEDICINE

## 2021-04-27 RX ORDER — OXYCODONE AND ACETAMINOPHEN 5; 325 MG/1; MG/1
1 TABLET ORAL EVERY 8 HOURS PRN
Qty: 30 TABLET | Refills: 0 | Status: SHIPPED | OUTPATIENT
Start: 2021-04-27 | End: 2021-05-27

## 2021-05-11 ENCOUNTER — INFUSION (OUTPATIENT)
Dept: INFUSION THERAPY | Facility: HOSPITAL | Age: 76
End: 2021-05-11
Attending: INTERNAL MEDICINE
Payer: MEDICARE

## 2021-05-11 ENCOUNTER — HOSPITAL ENCOUNTER (OUTPATIENT)
Dept: RADIOLOGY | Facility: HOSPITAL | Age: 76
Discharge: HOME | End: 2021-05-11
Attending: INTERNAL MEDICINE
Payer: MEDICARE

## 2021-05-11 VITALS
RESPIRATION RATE: 17 BRPM | SYSTOLIC BLOOD PRESSURE: 165 MMHG | TEMPERATURE: 98 F | OXYGEN SATURATION: 99 % | DIASTOLIC BLOOD PRESSURE: 68 MMHG | HEART RATE: 57 BPM

## 2021-05-11 DIAGNOSIS — C34.90 SQUAMOUS CELL CARCINOMA OF LUNG, UNSPECIFIED LATERALITY: ICD-10-CM

## 2021-05-11 DIAGNOSIS — R51.9 FREQUENT HEADACHES: ICD-10-CM

## 2021-05-11 DIAGNOSIS — C34.90 SQUAMOUS CELL CARCINOMA LUNG: Primary | ICD-10-CM

## 2021-05-11 PROCEDURE — 36593 DECLOT VASCULAR DEVICE: CPT

## 2021-05-11 PROCEDURE — A4216 STERILE WATER/SALINE, 10 ML: HCPCS | Performed by: INTERNAL MEDICINE

## 2021-05-11 PROCEDURE — 70553 MRI BRAIN STEM W/O & W/DYE: CPT | Mod: TC

## 2021-05-11 PROCEDURE — 70553 MRI BRAIN STEM W/O & W/DYE: CPT | Mod: 26,,, | Performed by: RADIOLOGY

## 2021-05-11 PROCEDURE — A9585 GADOBUTROL INJECTION: HCPCS | Performed by: INTERNAL MEDICINE

## 2021-05-11 PROCEDURE — 25500020 PHARM REV CODE 255: Performed by: INTERNAL MEDICINE

## 2021-05-11 PROCEDURE — 63600175 PHARM REV CODE 636 W HCPCS: Performed by: INTERNAL MEDICINE

## 2021-05-11 PROCEDURE — 70553 MRI BRAIN W WO CONTRAST: ICD-10-PCS | Mod: 26,,, | Performed by: RADIOLOGY

## 2021-05-11 PROCEDURE — 25000003 PHARM REV CODE 250: Performed by: INTERNAL MEDICINE

## 2021-05-11 RX ORDER — GADOBUTROL 604.72 MG/ML
10 INJECTION INTRAVENOUS
Status: COMPLETED | OUTPATIENT
Start: 2021-05-11 | End: 2021-05-11

## 2021-05-11 RX ORDER — SODIUM CHLORIDE 0.9 % (FLUSH) 0.9 %
10 SYRINGE (ML) INJECTION
Status: DISCONTINUED | OUTPATIENT
Start: 2021-05-11 | End: 2021-05-11 | Stop reason: HOSPADM

## 2021-05-11 RX ORDER — HEPARIN 100 UNIT/ML
500 SYRINGE INTRAVENOUS
Status: DISCONTINUED | OUTPATIENT
Start: 2021-05-11 | End: 2021-05-11 | Stop reason: HOSPADM

## 2021-05-11 RX ADMIN — HEPARIN 500 UNITS: 100 SYRINGE at 09:05

## 2021-05-11 RX ADMIN — GADOBUTROL 10 ML: 604.72 INJECTION INTRAVENOUS at 09:05

## 2021-05-11 RX ADMIN — Medication 10 ML: at 09:05

## 2021-05-20 ENCOUNTER — HOSPITAL ENCOUNTER (OUTPATIENT)
Dept: RADIOLOGY | Facility: HOSPITAL | Age: 76
Discharge: HOME | End: 2021-05-20
Attending: INTERNAL MEDICINE
Payer: MEDICARE

## 2021-05-20 DIAGNOSIS — C50.111 MALIGNANT NEOPLASM OF CENTRAL PORTION OF RIGHT BREAST IN FEMALE, ESTROGEN RECEPTOR NEGATIVE: ICD-10-CM

## 2021-05-20 DIAGNOSIS — Z17.1 MALIGNANT NEOPLASM OF CENTRAL PORTION OF RIGHT BREAST IN FEMALE, ESTROGEN RECEPTOR NEGATIVE: ICD-10-CM

## 2021-05-20 DIAGNOSIS — R59.0 AXILLARY LYMPHADENOPATHY: ICD-10-CM

## 2021-05-20 DIAGNOSIS — R94.8 ABNORMAL POSITRON EMISSION TOMOGRAPHY (PET) SCAN: ICD-10-CM

## 2021-05-20 DIAGNOSIS — C34.90 SQUAMOUS CELL CARCINOMA OF LUNG, UNSPECIFIED LATERALITY: ICD-10-CM

## 2021-05-20 DIAGNOSIS — C50.919 RECURRENT BREAST CANCER, UNSPECIFIED LATERALITY: ICD-10-CM

## 2021-05-20 PROCEDURE — 78815 PET IMAGE W/CT SKULL-THIGH: CPT | Mod: 26,PS,, | Performed by: RADIOLOGY

## 2021-05-20 PROCEDURE — 78815 PET IMAGE W/CT SKULL-THIGH: CPT | Mod: TC,PS

## 2021-05-20 PROCEDURE — 78815 NM PET CT ROUTINE: ICD-10-PCS | Mod: 26,PS,, | Performed by: RADIOLOGY

## 2021-06-02 ENCOUNTER — TELEPHONE (OUTPATIENT)
Dept: SURGERY | Facility: CLINIC | Age: 76
End: 2021-06-02

## 2021-06-02 ENCOUNTER — OFFICE VISIT (OUTPATIENT)
Dept: HEMATOLOGY/ONCOLOGY | Facility: CLINIC | Age: 76
End: 2021-06-02
Payer: MEDICARE

## 2021-06-02 VITALS
OXYGEN SATURATION: 96 % | TEMPERATURE: 98 F | HEART RATE: 55 BPM | WEIGHT: 178.56 LBS | HEIGHT: 63 IN | SYSTOLIC BLOOD PRESSURE: 131 MMHG | DIASTOLIC BLOOD PRESSURE: 87 MMHG | BODY MASS INDEX: 31.64 KG/M2

## 2021-06-02 DIAGNOSIS — C34.91 SQUAMOUS CELL CARCINOMA OF RIGHT LUNG: ICD-10-CM

## 2021-06-02 DIAGNOSIS — J44.9 CHRONIC OBSTRUCTIVE PULMONARY DISEASE, UNSPECIFIED COPD TYPE: ICD-10-CM

## 2021-06-02 DIAGNOSIS — R59.0 AXILLARY LYMPHADENOPATHY: ICD-10-CM

## 2021-06-02 DIAGNOSIS — R06.09 DOE (DYSPNEA ON EXERTION): ICD-10-CM

## 2021-06-02 DIAGNOSIS — C50.919 RECURRENT BREAST CANCER, UNSPECIFIED LATERALITY: Primary | ICD-10-CM

## 2021-06-02 PROCEDURE — 3008F PR BODY MASS INDEX (BMI) DOCUMENTED: ICD-10-PCS | Mod: CPTII,S$GLB,, | Performed by: INTERNAL MEDICINE

## 2021-06-02 PROCEDURE — 99215 PR OFFICE/OUTPT VISIT, EST, LEVL V, 40-54 MIN: ICD-10-PCS | Mod: S$GLB,,, | Performed by: INTERNAL MEDICINE

## 2021-06-02 PROCEDURE — 1101F PT FALLS ASSESS-DOCD LE1/YR: CPT | Mod: CPTII,S$GLB,, | Performed by: INTERNAL MEDICINE

## 2021-06-02 PROCEDURE — 99999 PR PBB SHADOW E&M-EST. PATIENT-LVL III: CPT | Mod: PBBFAC,,, | Performed by: INTERNAL MEDICINE

## 2021-06-02 PROCEDURE — 1126F PR PAIN SEVERITY QUANTIFIED, NO PAIN PRESENT: ICD-10-PCS | Mod: S$GLB,,, | Performed by: INTERNAL MEDICINE

## 2021-06-02 PROCEDURE — 1159F MED LIST DOCD IN RCRD: CPT | Mod: S$GLB,,, | Performed by: INTERNAL MEDICINE

## 2021-06-02 PROCEDURE — 1157F PR ADVANCE CARE PLAN OR EQUIV PRESENT IN MEDICAL RECORD: ICD-10-PCS | Mod: S$GLB,,, | Performed by: INTERNAL MEDICINE

## 2021-06-02 PROCEDURE — 3008F BODY MASS INDEX DOCD: CPT | Mod: CPTII,S$GLB,, | Performed by: INTERNAL MEDICINE

## 2021-06-02 PROCEDURE — 1159F PR MEDICATION LIST DOCUMENTED IN MEDICAL RECORD: ICD-10-PCS | Mod: S$GLB,,, | Performed by: INTERNAL MEDICINE

## 2021-06-02 PROCEDURE — 3288F FALL RISK ASSESSMENT DOCD: CPT | Mod: CPTII,S$GLB,, | Performed by: INTERNAL MEDICINE

## 2021-06-02 PROCEDURE — 99999 PR PBB SHADOW E&M-EST. PATIENT-LVL III: ICD-10-PCS | Mod: PBBFAC,,, | Performed by: INTERNAL MEDICINE

## 2021-06-02 PROCEDURE — 99499 UNLISTED E&M SERVICE: CPT | Mod: HCNC,S$GLB,, | Performed by: INTERNAL MEDICINE

## 2021-06-02 PROCEDURE — 1126F AMNT PAIN NOTED NONE PRSNT: CPT | Mod: S$GLB,,, | Performed by: INTERNAL MEDICINE

## 2021-06-02 PROCEDURE — 1101F PR PT FALLS ASSESS DOC 0-1 FALLS W/OUT INJ PAST YR: ICD-10-PCS | Mod: CPTII,S$GLB,, | Performed by: INTERNAL MEDICINE

## 2021-06-02 PROCEDURE — 99215 OFFICE O/P EST HI 40 MIN: CPT | Mod: S$GLB,,, | Performed by: INTERNAL MEDICINE

## 2021-06-02 PROCEDURE — 99499 RISK ADDL DX/OHS AUDIT: ICD-10-PCS | Mod: HCNC,S$GLB,, | Performed by: INTERNAL MEDICINE

## 2021-06-02 PROCEDURE — 1157F ADVNC CARE PLAN IN RCRD: CPT | Mod: S$GLB,,, | Performed by: INTERNAL MEDICINE

## 2021-06-02 PROCEDURE — 3288F PR FALLS RISK ASSESSMENT DOCUMENTED: ICD-10-PCS | Mod: CPTII,S$GLB,, | Performed by: INTERNAL MEDICINE

## 2021-06-03 ENCOUNTER — OFFICE VISIT (OUTPATIENT)
Dept: SURGERY | Facility: CLINIC | Age: 76
End: 2021-06-03
Payer: MEDICARE

## 2021-06-03 VITALS
BODY MASS INDEX: 31.18 KG/M2 | DIASTOLIC BLOOD PRESSURE: 65 MMHG | HEART RATE: 65 BPM | HEIGHT: 63 IN | SYSTOLIC BLOOD PRESSURE: 160 MMHG | WEIGHT: 176 LBS

## 2021-06-03 DIAGNOSIS — Z85.3 PERSONAL HISTORY OF BREAST CANCER: Primary | ICD-10-CM

## 2021-06-03 PROCEDURE — 1101F PT FALLS ASSESS-DOCD LE1/YR: CPT | Mod: CPTII,S$GLB,, | Performed by: SURGERY

## 2021-06-03 PROCEDURE — 1126F AMNT PAIN NOTED NONE PRSNT: CPT | Mod: S$GLB,,, | Performed by: SURGERY

## 2021-06-03 PROCEDURE — 1157F PR ADVANCE CARE PLAN OR EQUIV PRESENT IN MEDICAL RECORD: ICD-10-PCS | Mod: S$GLB,,, | Performed by: SURGERY

## 2021-06-03 PROCEDURE — 99999 PR PBB SHADOW E&M-EST. PATIENT-LVL III: ICD-10-PCS | Mod: PBBFAC,,, | Performed by: SURGERY

## 2021-06-03 PROCEDURE — 88342 IMHCHEM/IMCYTCHM 1ST ANTB: CPT | Performed by: STUDENT IN AN ORGANIZED HEALTH CARE EDUCATION/TRAINING PROGRAM

## 2021-06-03 PROCEDURE — 88305 TISSUE EXAM BY PATHOLOGIST: ICD-10-PCS | Mod: 26,,, | Performed by: STUDENT IN AN ORGANIZED HEALTH CARE EDUCATION/TRAINING PROGRAM

## 2021-06-03 PROCEDURE — 1159F MED LIST DOCD IN RCRD: CPT | Mod: S$GLB,,, | Performed by: SURGERY

## 2021-06-03 PROCEDURE — 88342 IMHCHEM/IMCYTCHM 1ST ANTB: CPT | Mod: 26,,, | Performed by: STUDENT IN AN ORGANIZED HEALTH CARE EDUCATION/TRAINING PROGRAM

## 2021-06-03 PROCEDURE — 88342 CHG IMMUNOCYTOCHEMISTRY: ICD-10-PCS | Mod: 26,,, | Performed by: STUDENT IN AN ORGANIZED HEALTH CARE EDUCATION/TRAINING PROGRAM

## 2021-06-03 PROCEDURE — 99214 PR OFFICE/OUTPT VISIT, EST, LEVL IV, 30-39 MIN: ICD-10-PCS | Mod: ,,, | Performed by: SURGERY

## 2021-06-03 PROCEDURE — 88305 TISSUE EXAM BY PATHOLOGIST: CPT | Performed by: STUDENT IN AN ORGANIZED HEALTH CARE EDUCATION/TRAINING PROGRAM

## 2021-06-03 PROCEDURE — 3288F PR FALLS RISK ASSESSMENT DOCUMENTED: ICD-10-PCS | Mod: CPTII,S$GLB,, | Performed by: SURGERY

## 2021-06-03 PROCEDURE — 3008F PR BODY MASS INDEX (BMI) DOCUMENTED: ICD-10-PCS | Mod: CPTII,S$GLB,, | Performed by: SURGERY

## 2021-06-03 PROCEDURE — 88305 TISSUE EXAM BY PATHOLOGIST: CPT | Mod: 26,,, | Performed by: STUDENT IN AN ORGANIZED HEALTH CARE EDUCATION/TRAINING PROGRAM

## 2021-06-03 PROCEDURE — 3288F FALL RISK ASSESSMENT DOCD: CPT | Mod: CPTII,S$GLB,, | Performed by: SURGERY

## 2021-06-03 PROCEDURE — 99499 RISK ADDL DX/OHS AUDIT: ICD-10-PCS | Mod: HCNC,S$GLB,, | Performed by: SURGERY

## 2021-06-03 PROCEDURE — 99214 OFFICE O/P EST MOD 30 MIN: CPT | Mod: ,,, | Performed by: SURGERY

## 2021-06-03 PROCEDURE — 99499 UNLISTED E&M SERVICE: CPT | Mod: HCNC,S$GLB,, | Performed by: SURGERY

## 2021-06-03 PROCEDURE — 1159F PR MEDICATION LIST DOCUMENTED IN MEDICAL RECORD: ICD-10-PCS | Mod: S$GLB,,, | Performed by: SURGERY

## 2021-06-03 PROCEDURE — 1157F ADVNC CARE PLAN IN RCRD: CPT | Mod: S$GLB,,, | Performed by: SURGERY

## 2021-06-03 PROCEDURE — 1126F PR PAIN SEVERITY QUANTIFIED, NO PAIN PRESENT: ICD-10-PCS | Mod: S$GLB,,, | Performed by: SURGERY

## 2021-06-03 PROCEDURE — 3008F BODY MASS INDEX DOCD: CPT | Mod: CPTII,S$GLB,, | Performed by: SURGERY

## 2021-06-03 PROCEDURE — 99999 PR PBB SHADOW E&M-EST. PATIENT-LVL III: CPT | Mod: PBBFAC,,, | Performed by: SURGERY

## 2021-06-03 PROCEDURE — 1101F PR PT FALLS ASSESS DOC 0-1 FALLS W/OUT INJ PAST YR: ICD-10-PCS | Mod: CPTII,S$GLB,, | Performed by: SURGERY

## 2021-06-07 ENCOUNTER — OFFICE VISIT (OUTPATIENT)
Dept: SURGERY | Facility: CLINIC | Age: 76
End: 2021-06-07
Payer: MEDICARE

## 2021-06-07 VITALS
HEIGHT: 63 IN | WEIGHT: 176 LBS | HEART RATE: 64 BPM | DIASTOLIC BLOOD PRESSURE: 64 MMHG | SYSTOLIC BLOOD PRESSURE: 115 MMHG | BODY MASS INDEX: 31.18 KG/M2

## 2021-06-07 DIAGNOSIS — R59.0 AXILLARY LYMPHADENOPATHY: ICD-10-CM

## 2021-06-07 DIAGNOSIS — C50.919 METASTATIC BREAST CANCER: Primary | Chronic | ICD-10-CM

## 2021-06-07 DIAGNOSIS — C50.919 RECURRENT BREAST CANCER, UNSPECIFIED LATERALITY: Primary | ICD-10-CM

## 2021-06-07 LAB
COMMENT: NORMAL
FINAL PATHOLOGIC DIAGNOSIS: NORMAL
GROSS: NORMAL
Lab: NORMAL

## 2021-06-07 PROCEDURE — 1157F ADVNC CARE PLAN IN RCRD: CPT | Mod: S$GLB,,, | Performed by: SURGERY

## 2021-06-07 PROCEDURE — 99999 PR PBB SHADOW E&M-EST. PATIENT-LVL III: ICD-10-PCS | Mod: PBBFAC,,, | Performed by: SURGERY

## 2021-06-07 PROCEDURE — 1157F PR ADVANCE CARE PLAN OR EQUIV PRESENT IN MEDICAL RECORD: ICD-10-PCS | Mod: S$GLB,,, | Performed by: SURGERY

## 2021-06-07 PROCEDURE — 3288F PR FALLS RISK ASSESSMENT DOCUMENTED: ICD-10-PCS | Mod: CPTII,S$GLB,, | Performed by: SURGERY

## 2021-06-07 PROCEDURE — 1126F AMNT PAIN NOTED NONE PRSNT: CPT | Mod: S$GLB,,, | Performed by: SURGERY

## 2021-06-07 PROCEDURE — 99999 PR PBB SHADOW E&M-EST. PATIENT-LVL III: CPT | Mod: PBBFAC,,, | Performed by: SURGERY

## 2021-06-07 PROCEDURE — 1101F PR PT FALLS ASSESS DOC 0-1 FALLS W/OUT INJ PAST YR: ICD-10-PCS | Mod: CPTII,S$GLB,, | Performed by: SURGERY

## 2021-06-07 PROCEDURE — 99024 PR POST-OP FOLLOW-UP VISIT: ICD-10-PCS | Mod: S$GLB,,, | Performed by: SURGERY

## 2021-06-07 PROCEDURE — 3008F BODY MASS INDEX DOCD: CPT | Mod: CPTII,S$GLB,, | Performed by: SURGERY

## 2021-06-07 PROCEDURE — 3008F PR BODY MASS INDEX (BMI) DOCUMENTED: ICD-10-PCS | Mod: CPTII,S$GLB,, | Performed by: SURGERY

## 2021-06-07 PROCEDURE — 1101F PT FALLS ASSESS-DOCD LE1/YR: CPT | Mod: CPTII,S$GLB,, | Performed by: SURGERY

## 2021-06-07 PROCEDURE — 99024 POSTOP FOLLOW-UP VISIT: CPT | Mod: S$GLB,,, | Performed by: SURGERY

## 2021-06-07 PROCEDURE — 1126F PR PAIN SEVERITY QUANTIFIED, NO PAIN PRESENT: ICD-10-PCS | Mod: S$GLB,,, | Performed by: SURGERY

## 2021-06-07 PROCEDURE — 3288F FALL RISK ASSESSMENT DOCD: CPT | Mod: CPTII,S$GLB,, | Performed by: SURGERY

## 2021-06-08 ENCOUNTER — TELEPHONE (OUTPATIENT)
Dept: HEMATOLOGY/ONCOLOGY | Facility: CLINIC | Age: 76
End: 2021-06-08

## 2021-06-08 NOTE — PLAN OF CARE
Problem: Adult Inpatient Plan of Care  Goal: Plan of Care Review  Outcome: Ongoing (interventions implemented as appropriate)  Patient tolerated monthly portacath flush. Received follow up appointments and ambulated unassisted off unit.        RHEUMATOLOGY FOLLOW UP VISIT    Name: Coleen Darnell  : 1931     Reason for visit:  She is an established patient here for follow-up management of RA.    Language barrier, she speaks Bengali and her daughter translates for her.      History of present illness:   Follow-up patient for rheumatoid arthritis, osteoarthritis of multiple joints, myofascial pain syndrome here for evaluation, disease activity monitoring, therapeutic drug monitoring.     In brief:   Ms Darnell was diagnosed with RA in 2012 by Dr Naranjo, the markers were negative, ESR elevation, previously on methotrexate 17.5 mg once a week, hydroxychloroquine 200 mg BID and folic acid in addition to prednisone. Humira was started 2014. , she developed multiple gastric ulcers, CMV viremia and C diff infection, treated with ganciclovir. She was off humira, Humira restarted in 2015.she has myofascial pain in addition to Humira,on duloxetine and gabapentin. Methotrexate was stopped due to elevated LFTS in . She has myofascial pain in addition to the RA.which is her major issue at this time.  IV zoledronic acid n .  Prolia started 2019, 2019, 2020, last dose 2021.     Today:  RA is in control, she continues to take her Humira and tolerating.  Status post left hip surgery , right hip was done in .  Right foot pain due to heel wound, followed at pain clinic and was advised to use Betadine.  She has been taking Norco 325-5 mg 3 times a day  for pain.  Per her daughter she was doing PT but had to stop as she is unable to walk.  Back pain: Stable, no complaint today.  Her daughter requested a refill on her duloxetine, gabapentin and Norco.  Review of systems:  Fatigue and tiredness is present, no fevers or chills.  No night sweats, no unexplained weight loss.  No swollen glands, no icterus, no photosensitivity   no alopecia, no raynauds phenomenon, no purpura, no urticaria   no chest pain,  no features of pleurisy, no pericardial pain   no shortness of breath or chronic cough.   history of GI ulcers, hence off the Fosamax,Diarrhea resolved, no blood in stool, no  heartburn, no abdominal pain.   No hematuria, no burning sensation with urination, no seizures, no memory loss, no history of psychosis      she has following chronic conditions.  1. Rheumatoid arthritis involving multiple sites with positive rheumatoid factor (CMS/HCC)    2. Myofascial pain dysfunction syndrome    3. Peripheral polyneuropathy    4. Debility    5. Status post hip surgery    6. Right foot pain    7. High risk medication use        Past Medical History:   Diagnosis Date   • Anemia    • Arthritis    • Cataract     bilateral   • Colles' fracture of left radius    • Cytomegalovirus (CMV) viremia (CMS/HCC)    • Diabetes mellitus (CMS/HCC)    • Essential (primary) hypertension    • Gastroesophageal reflux disease 3/19/2021   • Heart murmur    • Herpes simplex viremia (CMS/HCC)    • Multiple gastric ulcers    • Neuromuscular disorder (CMS/[facility])    • NPDR (nonproliferative diabetic retinopathy) (CMS/[facility])     mild   • Osteoporosis    • Recurrent major depressive disorder (CMS/HCC) 3/23/2021   • S/p left hip fracture    • Sickle cell anemia (CMS/HCC)         Past Surgical History:   Procedure Laterality Date   • Cataract extraction, bilateral      left - 8-; right 7-13-07   • Cholecystectomy  1993   • Esophagogastroduodenoscopy  06/07/2009    mild gastritis;3-7-07gastric ulcer & gastritis    • Fracture surgery     • Hip fracture surgery     • Lumbar spine surgery      laminectomy - 1976   • Oliva nail im rodding  03/12/2021    left intertrochanteric hip fracture     • Spine surgery     • Squamous cell carcinoma excision  12/06/2019    wide excision of basosquamous carcinoma.        Family History   Problem Relation Age of Onset   • Cancer Mother    • Rheumatoid Arthritis Mother    • Diabetes Father        Social History     Tobacco  Use   • Smoking status: Never Smoker   • Smokeless tobacco: Never Used   Substance Use Topics   • Alcohol use: No   • Drug use: Never       Current Outpatient Medications   Medication Sig Last Dose   • DULoxetine (CYMBALTA) 60 MG capsule Take 1 capsule by mouth daily.    • pregabalin (LYRICA) 75 MG capsule Take 1 capsule by mouth 2 times daily.    • ketoconazole (NIZORAL) 2 % cream Apply topically daily. Taking at Unknown time   • sitaGLIPtin (JANUVIA) 50 MG tablet Take 1 tablet by mouth daily. Taking at Unknown time   • levocetirizine (XYZAL) 5 MG tablet Take 1 tablet by mouth every evening. Taking at Unknown time   • traMADol (ULTRAM) 50 MG tablet Take 1 tablet by mouth 2 times daily as needed for Pain. Taking at Unknown time   • HYDROcodone-acetaminophen (NORCO) 5-325 MG per tablet Take 1 tablet by mouth 3 times daily as needed for Pain. Taking at Unknown time   • insulin aspart (NovoLOG FlexPen) 100 UNIT/ML pen-injector Prime 2 units before each dose.inject under skin using sliding scale.  201-250 -5 units. ; 251 - 300- 7 units  ; 301-350 - 9 units ; 351-400- 11 units. Taking at Unknown time   • Accu-Chek Guide test strip TEST BLOOD SUGAR THREE TIMES DAILY AS DIRECTED Taking at Unknown time   • silver sulfADIAZINE (Silvadene) 1 % cream Apply topically daily. Apply to a thickness of 1/16 inch (\"nickel thick\"). Taking at Unknown time   • lidocaine (LIDODERM) 5 % patch Place 1 patch onto the skin every 24 hours. Remove patch 12 hours after applying Taking at Unknown time   • potassium chloride (KLOR-CON) 10 MEQ ER tablet Take 1 tablet by mouth daily. Taking at Unknown time   • Insulin Lispro, 1 Unit Dial, (HumaLOG KwikPen) 100 UNIT/ML pen-injector Prime 2 units before each dose.. Inject under the skin using a sliding scale 150-200- 3 units ; 201-250 - 5 units ; 251-300- 7 units ; 301 -350 - 9 units ; 351-400- 11 units. Taking at Unknown time   • fosinopril (MONOPRIL) 10 MG tablet Take 10 mg by mouth daily. Taking  at Unknown time   • omeprazole (PriLOSEC) 40 MG capsule Take 40 mg by mouth daily. Taking at Unknown time   • calcium carbonate-vitamin D (CALTRATE+D) 600-400 MG-UNIT per tablet Take 1 tablet by mouth daily. Taking at Unknown time   • cholecalciferol (VITAMIN D) 10 mcg (400 units)/mL oral liquid Take 5 mLs by mouth daily. Taking at Unknown time   • furosemide (LASIX) 20 MG tablet Take 20 mg by mouth daily. Taking at Unknown time   • insulin glargine 100 UNIT/ML pen-injector Inject 20 Units into the skin nightly. Do not start before April 5, 2021. Prime 2 units before each dose. Taking at Unknown time   • polyethylene glycol (MIRALAX) 17 g packet Take 17 g by mouth daily. Stir and dissolve powder in any 4 to 8 ounces of beverage, then drink. Taking at Unknown time   • bisacodyl (DULCOLAX) 10 MG suppository Place 10 mg rectally daily as needed. Taking at Unknown time   • ascorbic acid (Vitamin C) 250 MG tablet Take 250 mg by mouth daily. Taking at Unknown time   • predniSONE (DELTASONE) 2.5 MG tablet Take 1 tablet by mouth daily. Taking at Unknown time   • ADALimumab (HUMIRA) 40 MG/0.4ML citrate free Inject 0.4 mLs into the skin every 14 days. Taking at Unknown time   • Insulin Pen Needle (BD Pen Needle Lyudmila U/F) 32G X 4 MM Misc Use to inject insulin 1 times daily. Remove needle cover(s) to expose needle before injecting. Taking at Unknown time   • gemfibrozil (LOPID) 600 MG tablet TAKE 1 TABLET EVERY DAY Taking at Unknown time   • Blood Glucose Monitoring Suppl (ACCU-CHEK GUIDE) w/Device Kit 1 kit 3 times daily. Taking at Unknown time   • ACCU-CHEK FASTCLIX LANCETS Misc Check sugar 3 times a day Taking at Unknown time   • pregabalin (Lyrica) 50 MG capsule Take 1 capsule by mouth 2 times daily. Not Taking at Unknown time   • docusate sodium-sennosides (Senokot S) 50-8.6 MG per tablet Take 2 tablets daily as needed    • apixaBAN (ELIQUIS) 2.5 MG Tab Take 2.5 mg by mouth every 12 hours. Not Taking at Unknown time   •  docusate sodium-sennosides (SENOKOT S) 50-8.6 MG per tablet Take 2 tablets by mouth daily as needed.    • linaGLIPtin (TRADJENTA) 5 MG tablet Take 5 mg by mouth daily. Not Taking at Unknown time   • magnesium hydroxide (MILK OF MAGNESIA) 400 MG/5ML suspension Take 30 mLs by mouth daily as needed. Not Taking at Unknown time   • ferrous sulfate 325 (65 FE) MG tablet Take 325 mg by mouth daily (with breakfast). Not Taking at Unknown time     No current facility-administered medications for this visit.       Review of systems:   Ten or more systems reviewed are negative except as noted above in HPI       Physical examination:  In pain, elderly lady in wheel chair,  a&o x 3, right heel wound with pain, status post left hip replacement.. Breathing normally, vital signs reviewed.  Skin: dry skin with patches. Chest lesion-resected.     HEENT: MELO, EOMI, no sinus tenderness, oral mucosa is moist, no cervical lymphadenopathy.  Chest: No costochondritis. Air entry bilaterally equal, no crackles or wheezes heard.   CVS: S1 S2 heard, no murmurs, RRR, no rubs or gallops.   Abdomen : soft non tender, no organomegaly, bowel sounds are normal.   Musculoskeletal: She has generalized nonspecific pain over shoulders, base of neck and arms.  Bilateral shoulder stable, no pain today.  Elbows: shoulders PROM not painful, her range of motion is normal today.   Wrists: Left wrist is deformed from a previous fracture, no features of active RA.  Hands: No PIP, MCP or CMC tenderness  Hips: Not examined today.  Myofascial pain over the trochanter, and the right thigh ( previious fracture with ORIF), ROM is midly reduced in right hip.   Knees: myofascial pain around the knees, no joint swelling or tenderness, flexion extension is normal.   Ankle and feet: Right heel wound   Neurology: Cranial nerves II to XII are grossly normal, no focal neurological deficits.   Psychiatric: Anxious, seems frustrated in pain.      Visit Vitals:  /60 (BP  Location: LUE - Left upper extremity, Patient Position: Sitting, Cuff Size: Regular)   Pulse 99   Temp 98.3 °F (36.8 °C)   Ht 5' 1\" (1.549 m)   Wt 62 kg (136 lb 11 oz)   SpO2 97%   BMI 25.83 kg/m²       LABs:    No visits with results within 1 Day(s) from this visit.   Latest known visit with results is:   Office Visit on 05/19/2021   Component Date Value Ref Range Status   • Glucose Blood, POC 05/19/2021 138* 65 - 99 mg/dL In process   • FASTING STATUS, POC 05/19/2021 138   In process     IMAGING:    MRI FOOT RIGHT WO CONTRAST    EXAM DATE: 5/6/2021 11:24 AM    INDICATION: Right foot pain, heel pain, heel ulcer    COMPARISON: Right foot radiographs 04/29/2021    TECHNIQUE: Multiplanar multisequence MRI of the right foot without  contrast, targeted towards assessment of the hindfoot and midfoot.    Impression:   1.    No discrete ulceration or abscess or osteomyelitis is identified.   Mild skin edema at the heel is nonspecific, possibly cellulitis or other  nonspecific inflammation.    2.   Diffuse advanced muscular atrophy may represent sequela of chronic  denervation or disuse.  3.   Mild osteoarthritis of the 2nd and 3rd TMT joints.  Diffuse grade II  chondromalacia in the hindfoot and midfoot.    Electronically Signed by: MORENITA MILLIGAN MD 5/6/2021 2:08 PM       Assessment:    Coleen was seen today for follow-up, office visit and rheumatoid arthritis.  Diagnoses and all orders for this visit:    Rheumatoid arthritis involving multiple sites with positive rheumatoid factor (CMS/HCC)  Myofascial pain dysfunction syndrome  -     DULoxetine (CYMBALTA) 60 MG capsule; Take 1 capsule by mouth daily.  Peripheral polyneuropathy  -     pregabalin (LYRICA) 75 MG capsule; Take 1 capsule by mouth 2 times daily.  Debility  Status post hip surgery  Right foot pain  High risk medication use      Plan:    Rheuamtoid arthritis, seronegative: CRP is 4.7, 0 tender joints, 1 swollen joint [right heel wound) VAS 100 /100, ESR 72  ( 2/2  right heel wound, status post left hip replacement].  RA is in remission     Plan:   -Continue Humira 40 mg every other week SQ.   -On hydroxychloroquine to 200 mg once daily.    -Prednisone has been tapered off.  -The pathophysiology of rheumatoid arthritis versus other forms of arthritis, various medical management, was explained to patient and daughter.  -Eye exam in 09/2020 was normal, no maculopathy.  -Last labs reviewed and discussed with patient and daughter.        Right heel wound: She did follow with wound clinic and was advised to use Betadine.  On Norco for pain, Dr. Parrish recently gave patient 50 tablets on May 19 and her daughter was requesting a refill on today's visit.  I was unable to refill her Norco as her last refill was 2 weeks ago.      Debility: Status post left hip surgery, was doing PT.  Per her daughter he had to stop physical therapy as patient is unable to walk due to right heel wound.        Fibromyalgia: Seems to be in control today as her main complaint today was right heel wound pain and left hip pain status post surgery on 03/2021.  Plan:  -Refilled gabapentin and duloxetine.  -Fibromyalgia is persistent and problematic for her which we have been giving her medications for it.  She also has rheumatoid arthritis.  -Continue to exercise and stretch as tolerated.        Osteoporosis: No more fractures, received IV zoledronic acid May 2016, fosamax held due to gastric ulcers.   FRAX Score, 2018:  10 year fracture probability is 45% for major osteoporotic fracture and 24% for hip fracture, which is very high. She has already fractured one hip.   -1st dose in 5/2019., 2nd dose 11/27/ 2019( She has  finished 3 years of IV reclast, there is still osteoporosis , oral alendronates held due to gastric ulcers and complications)   Plan:   -Received 3rd dose of Prolia 08/2020 and 4th in 02/2021.  -Continue Calicum and vitamin D.   -Pending DEXA scan, scheduled for March 17.         COVID-19: She is  aware of the pandemic and has been practicing universal precautions, asymptomatic today.  She completed both doses of the Moderna Covid vaccination with no complications.        High risk medication use:   Hydroxychloroquine, humira: No cough, no infection, previous CMV infection resolved. labs are in normal range, no cell counts, liver functin and renal function, eye exam is normal.   Humira, no infections, Plaquenil, no visual problems.   Eye exam in 09/2020, no retinopathy.  Needs yearly eye exam.  Last labs reviewed and discussed with patient and daughter.        CC to Dr Parrish,     Proper usage and side effects of medications reviewed & discussed.   Take medication as directed.   Patient education completed on disease process, etiology & prognosis.   Return to clinic as clinically indicated as discussed with patient who verbalized understanding of & agreement.        Return in about 3 months (around 9/8/2021).    Gen Galeas PA-C  6/8/2021

## 2021-06-10 ENCOUNTER — INFUSION (OUTPATIENT)
Dept: INFUSION THERAPY | Facility: HOSPITAL | Age: 76
End: 2021-06-10
Attending: INTERNAL MEDICINE
Payer: MEDICARE

## 2021-06-10 ENCOUNTER — TELEPHONE (OUTPATIENT)
Dept: HEMATOLOGY/ONCOLOGY | Facility: CLINIC | Age: 76
End: 2021-06-10

## 2021-06-10 VITALS
SYSTOLIC BLOOD PRESSURE: 142 MMHG | HEART RATE: 61 BPM | TEMPERATURE: 98 F | DIASTOLIC BLOOD PRESSURE: 88 MMHG | OXYGEN SATURATION: 97 % | RESPIRATION RATE: 16 BRPM

## 2021-06-10 DIAGNOSIS — C34.90 SQUAMOUS CELL CARCINOMA LUNG: Primary | ICD-10-CM

## 2021-06-10 PROCEDURE — 96523 IRRIG DRUG DELIVERY DEVICE: CPT

## 2021-06-10 PROCEDURE — A4216 STERILE WATER/SALINE, 10 ML: HCPCS | Performed by: INTERNAL MEDICINE

## 2021-06-10 PROCEDURE — 25000003 PHARM REV CODE 250: Performed by: INTERNAL MEDICINE

## 2021-06-10 PROCEDURE — 63600175 PHARM REV CODE 636 W HCPCS: Performed by: INTERNAL MEDICINE

## 2021-06-10 RX ORDER — HEPARIN 100 UNIT/ML
500 SYRINGE INTRAVENOUS
Status: DISCONTINUED | OUTPATIENT
Start: 2021-06-10 | End: 2021-06-10 | Stop reason: HOSPADM

## 2021-06-10 RX ORDER — SODIUM CHLORIDE 0.9 % (FLUSH) 0.9 %
10 SYRINGE (ML) INJECTION
Status: DISCONTINUED | OUTPATIENT
Start: 2021-06-10 | End: 2021-06-10 | Stop reason: HOSPADM

## 2021-06-10 RX ADMIN — Medication 10 ML: at 09:06

## 2021-06-10 RX ADMIN — HEPARIN 500 UNITS: 100 SYRINGE at 09:06

## 2021-06-16 ENCOUNTER — HOSPITAL ENCOUNTER (OUTPATIENT)
Dept: RADIOLOGY | Facility: HOSPITAL | Age: 76
Discharge: HOME | End: 2021-06-16
Attending: INTERNAL MEDICINE
Payer: MEDICARE

## 2021-06-16 ENCOUNTER — HOSPITAL ENCOUNTER (OUTPATIENT)
Dept: INTERVENTIONAL RADIOLOGY/VASCULAR | Facility: HOSPITAL | Age: 76
Discharge: HOME | End: 2021-06-16
Attending: INTERNAL MEDICINE
Payer: MEDICARE

## 2021-06-16 DIAGNOSIS — R59.0 AXILLARY LYMPHADENOPATHY: ICD-10-CM

## 2021-06-16 DIAGNOSIS — C50.919 RECURRENT BREAST CANCER, UNSPECIFIED LATERALITY: ICD-10-CM

## 2021-06-16 PROCEDURE — 88342 IMHCHEM/IMCYTCHM 1ST ANTB: CPT | Mod: 26,,, | Performed by: PATHOLOGY

## 2021-06-16 PROCEDURE — 76942 IR ULTRASOUND GUIDANCE: ICD-10-PCS | Mod: 26,,, | Performed by: RADIOLOGY

## 2021-06-16 PROCEDURE — 88305 TISSUE EXAM BY PATHOLOGIST: CPT | Performed by: PATHOLOGY

## 2021-06-16 PROCEDURE — 77065 MAMMO DIGITAL DIAGNOSTIC RIGHT: ICD-10-PCS | Mod: 26,RT,, | Performed by: RADIOLOGY

## 2021-06-16 PROCEDURE — 88342 CHG IMMUNOCYTOCHEMISTRY: ICD-10-PCS | Mod: 26,,, | Performed by: PATHOLOGY

## 2021-06-16 PROCEDURE — 88360 TUMOR IMMUNOHISTOCHEM/MANUAL: CPT | Performed by: PATHOLOGY

## 2021-06-16 PROCEDURE — 88342 IMHCHEM/IMCYTCHM 1ST ANTB: CPT | Performed by: PATHOLOGY

## 2021-06-16 PROCEDURE — 88305 TISSUE EXAM BY PATHOLOGIST: ICD-10-PCS | Mod: 26,,, | Performed by: PATHOLOGY

## 2021-06-16 PROCEDURE — 88305 TISSUE EXAM BY PATHOLOGIST: CPT | Mod: 26,,, | Performed by: PATHOLOGY

## 2021-06-16 PROCEDURE — 77065 DX MAMMO INCL CAD UNI: CPT | Mod: 26,RT,, | Performed by: RADIOLOGY

## 2021-06-16 PROCEDURE — 77065 DX MAMMO INCL CAD UNI: CPT | Mod: TC,RT

## 2021-06-16 PROCEDURE — 88341 IMHCHEM/IMCYTCHM EA ADD ANTB: CPT | Mod: 26,,, | Performed by: PATHOLOGY

## 2021-06-16 PROCEDURE — 38505 NEEDLE BIOPSY LYMPH NODES: CPT | Mod: RT | Performed by: RADIOLOGY

## 2021-06-16 PROCEDURE — A4215 STERILE NEEDLE: HCPCS

## 2021-06-16 PROCEDURE — 88341 IMHCHEM/IMCYTCHM EA ADD ANTB: CPT | Mod: 59 | Performed by: PATHOLOGY

## 2021-06-16 PROCEDURE — 76942 ECHO GUIDE FOR BIOPSY: CPT | Mod: TC | Performed by: RADIOLOGY

## 2021-06-16 PROCEDURE — 88341 PR IHC OR ICC EACH ADD'L SINGLE ANTIBODY  STAINPR: ICD-10-PCS | Mod: 26,,, | Performed by: PATHOLOGY

## 2021-06-16 PROCEDURE — 38505 IR BIOPSY LYMPH NODE: ICD-10-PCS | Mod: RT,,, | Performed by: RADIOLOGY

## 2021-06-29 ENCOUNTER — OFFICE VISIT (OUTPATIENT)
Dept: HEMATOLOGY/ONCOLOGY | Facility: CLINIC | Age: 76
End: 2021-06-29
Payer: MEDICARE

## 2021-06-29 VITALS
DIASTOLIC BLOOD PRESSURE: 89 MMHG | BODY MASS INDEX: 29.92 KG/M2 | SYSTOLIC BLOOD PRESSURE: 138 MMHG | HEART RATE: 64 BPM | OXYGEN SATURATION: 94 % | HEIGHT: 63 IN | WEIGHT: 168.88 LBS | TEMPERATURE: 98 F

## 2021-06-29 DIAGNOSIS — J44.9 CHRONIC OBSTRUCTIVE PULMONARY DISEASE, UNSPECIFIED COPD TYPE: ICD-10-CM

## 2021-06-29 DIAGNOSIS — C34.91 SQUAMOUS CELL CARCINOMA OF RIGHT LUNG: ICD-10-CM

## 2021-06-29 DIAGNOSIS — C50.919 RECURRENT BREAST CANCER, UNSPECIFIED LATERALITY: Primary | ICD-10-CM

## 2021-06-29 DIAGNOSIS — R59.0 AXILLARY LYMPHADENOPATHY: ICD-10-CM

## 2021-06-29 PROCEDURE — 3008F BODY MASS INDEX DOCD: CPT | Mod: CPTII,S$GLB,, | Performed by: INTERNAL MEDICINE

## 2021-06-29 PROCEDURE — 99215 PR OFFICE/OUTPT VISIT, EST, LEVL V, 40-54 MIN: ICD-10-PCS | Mod: S$GLB,,, | Performed by: INTERNAL MEDICINE

## 2021-06-29 PROCEDURE — 1126F PR PAIN SEVERITY QUANTIFIED, NO PAIN PRESENT: ICD-10-PCS | Mod: S$GLB,,, | Performed by: INTERNAL MEDICINE

## 2021-06-29 PROCEDURE — 99215 OFFICE O/P EST HI 40 MIN: CPT | Mod: S$GLB,,, | Performed by: INTERNAL MEDICINE

## 2021-06-29 PROCEDURE — 1126F AMNT PAIN NOTED NONE PRSNT: CPT | Mod: S$GLB,,, | Performed by: INTERNAL MEDICINE

## 2021-06-29 PROCEDURE — 1157F ADVNC CARE PLAN IN RCRD: CPT | Mod: S$GLB,,, | Performed by: INTERNAL MEDICINE

## 2021-06-29 PROCEDURE — 99499 RISK ADDL DX/OHS AUDIT: ICD-10-PCS | Mod: S$GLB,,, | Performed by: INTERNAL MEDICINE

## 2021-06-29 PROCEDURE — 3008F PR BODY MASS INDEX (BMI) DOCUMENTED: ICD-10-PCS | Mod: CPTII,S$GLB,, | Performed by: INTERNAL MEDICINE

## 2021-06-29 PROCEDURE — 1159F PR MEDICATION LIST DOCUMENTED IN MEDICAL RECORD: ICD-10-PCS | Mod: S$GLB,,, | Performed by: INTERNAL MEDICINE

## 2021-06-29 PROCEDURE — 99999 PR PBB SHADOW E&M-EST. PATIENT-LVL V: ICD-10-PCS | Mod: PBBFAC,,, | Performed by: INTERNAL MEDICINE

## 2021-06-29 PROCEDURE — 99499 UNLISTED E&M SERVICE: CPT | Mod: S$GLB,,, | Performed by: INTERNAL MEDICINE

## 2021-06-29 PROCEDURE — 3288F PR FALLS RISK ASSESSMENT DOCUMENTED: ICD-10-PCS | Mod: CPTII,S$GLB,, | Performed by: INTERNAL MEDICINE

## 2021-06-29 PROCEDURE — 1157F PR ADVANCE CARE PLAN OR EQUIV PRESENT IN MEDICAL RECORD: ICD-10-PCS | Mod: S$GLB,,, | Performed by: INTERNAL MEDICINE

## 2021-06-29 PROCEDURE — 99999 PR PBB SHADOW E&M-EST. PATIENT-LVL V: CPT | Mod: PBBFAC,,, | Performed by: INTERNAL MEDICINE

## 2021-06-29 PROCEDURE — 1101F PR PT FALLS ASSESS DOC 0-1 FALLS W/OUT INJ PAST YR: ICD-10-PCS | Mod: CPTII,S$GLB,, | Performed by: INTERNAL MEDICINE

## 2021-06-29 PROCEDURE — 3288F FALL RISK ASSESSMENT DOCD: CPT | Mod: CPTII,S$GLB,, | Performed by: INTERNAL MEDICINE

## 2021-06-29 PROCEDURE — 1101F PT FALLS ASSESS-DOCD LE1/YR: CPT | Mod: CPTII,S$GLB,, | Performed by: INTERNAL MEDICINE

## 2021-06-29 PROCEDURE — 1159F MED LIST DOCD IN RCRD: CPT | Mod: S$GLB,,, | Performed by: INTERNAL MEDICINE

## 2021-07-02 LAB
COMMENT: ABNORMAL
FINAL PATHOLOGIC DIAGNOSIS: ABNORMAL
GROSS: ABNORMAL
Lab: ABNORMAL
SUPPLEMENTAL DIAGNOSIS: ABNORMAL

## 2021-07-13 ENCOUNTER — INFUSION (OUTPATIENT)
Dept: INFUSION THERAPY | Facility: HOSPITAL | Age: 76
End: 2021-07-13
Attending: INTERNAL MEDICINE
Payer: MEDICARE

## 2021-07-13 VITALS
TEMPERATURE: 98 F | HEART RATE: 56 BPM | RESPIRATION RATE: 16 BRPM | OXYGEN SATURATION: 97 % | SYSTOLIC BLOOD PRESSURE: 157 MMHG | DIASTOLIC BLOOD PRESSURE: 65 MMHG

## 2021-07-13 DIAGNOSIS — C50.919 RECURRENT BREAST CANCER, UNSPECIFIED LATERALITY: Primary | ICD-10-CM

## 2021-07-13 DIAGNOSIS — C34.90 SQUAMOUS CELL CARCINOMA LUNG: ICD-10-CM

## 2021-07-13 LAB
ALBUMIN SERPL BCP-MCNC: 3.9 G/DL (ref 3.5–5.2)
ALP SERPL-CCNC: 48 U/L (ref 55–135)
ALT SERPL W/O P-5'-P-CCNC: 13 U/L (ref 10–44)
ANION GAP SERPL CALC-SCNC: 9 MMOL/L (ref 8–16)
AST SERPL-CCNC: 18 U/L (ref 10–40)
BASOPHILS # BLD AUTO: 0.04 K/UL (ref 0–0.2)
BASOPHILS NFR BLD: 1 % (ref 0–1.9)
BILIRUB SERPL-MCNC: 0.5 MG/DL (ref 0.1–1)
BUN SERPL-MCNC: 10 MG/DL (ref 8–23)
CALCIUM SERPL-MCNC: 9.2 MG/DL (ref 8.7–10.5)
CHLORIDE SERPL-SCNC: 103 MMOL/L (ref 95–110)
CO2 SERPL-SCNC: 29 MMOL/L (ref 23–29)
CREAT SERPL-MCNC: 0.8 MG/DL (ref 0.5–1.4)
DIFFERENTIAL METHOD: ABNORMAL
EOSINOPHIL # BLD AUTO: 0.1 K/UL (ref 0–0.5)
EOSINOPHIL NFR BLD: 2.6 % (ref 0–8)
ERYTHROCYTE [DISTWIDTH] IN BLOOD BY AUTOMATED COUNT: 13.8 % (ref 11.5–14.5)
EST. GFR  (AFRICAN AMERICAN): >60 ML/MIN/1.73 M^2
EST. GFR  (NON AFRICAN AMERICAN): >60 ML/MIN/1.73 M^2
GLUCOSE SERPL-MCNC: 106 MG/DL (ref 70–110)
HCT VFR BLD AUTO: 41.3 % (ref 37–48.5)
HGB BLD-MCNC: 13.8 G/DL (ref 12–16)
IMM GRANULOCYTES # BLD AUTO: 0.02 K/UL (ref 0–0.04)
IMM GRANULOCYTES NFR BLD AUTO: 0.5 % (ref 0–0.5)
LYMPHOCYTES # BLD AUTO: 0.9 K/UL (ref 1–4.8)
LYMPHOCYTES NFR BLD: 23.8 % (ref 18–48)
MCH RBC QN AUTO: 31.2 PG (ref 27–31)
MCHC RBC AUTO-ENTMCNC: 33.4 G/DL (ref 32–36)
MCV RBC AUTO: 93 FL (ref 82–98)
MONOCYTES # BLD AUTO: 0.4 K/UL (ref 0.3–1)
MONOCYTES NFR BLD: 10.3 % (ref 4–15)
NEUTROPHILS # BLD AUTO: 2.4 K/UL (ref 1.8–7.7)
NEUTROPHILS NFR BLD: 61.8 % (ref 38–73)
NRBC BLD-RTO: 0 /100 WBC
PLATELET # BLD AUTO: 190 K/UL (ref 150–450)
PMV BLD AUTO: 11.6 FL (ref 9.2–12.9)
POTASSIUM SERPL-SCNC: 3.7 MMOL/L (ref 3.5–5.1)
PROT SERPL-MCNC: 6.8 G/DL (ref 6–8.4)
RBC # BLD AUTO: 4.43 M/UL (ref 4–5.4)
SODIUM SERPL-SCNC: 141 MMOL/L (ref 136–145)
TSH SERPL DL<=0.005 MIU/L-ACNC: 1.8 UIU/ML (ref 0.4–4)
WBC # BLD AUTO: 3.87 K/UL (ref 3.9–12.7)

## 2021-07-13 PROCEDURE — 25000003 PHARM REV CODE 250: Performed by: INTERNAL MEDICINE

## 2021-07-13 PROCEDURE — 85025 COMPLETE CBC W/AUTO DIFF WBC: CPT | Performed by: INTERNAL MEDICINE

## 2021-07-13 PROCEDURE — 80053 COMPREHEN METABOLIC PANEL: CPT | Performed by: INTERNAL MEDICINE

## 2021-07-13 PROCEDURE — 84443 ASSAY THYROID STIM HORMONE: CPT | Performed by: INTERNAL MEDICINE

## 2021-07-13 PROCEDURE — 96523 IRRIG DRUG DELIVERY DEVICE: CPT

## 2021-07-13 PROCEDURE — A4216 STERILE WATER/SALINE, 10 ML: HCPCS | Performed by: INTERNAL MEDICINE

## 2021-07-13 PROCEDURE — 36591 DRAW BLOOD OFF VENOUS DEVICE: CPT

## 2021-07-13 PROCEDURE — 63600175 PHARM REV CODE 636 W HCPCS: Performed by: INTERNAL MEDICINE

## 2021-07-13 RX ORDER — HEPARIN 100 UNIT/ML
500 SYRINGE INTRAVENOUS
Status: DISCONTINUED | OUTPATIENT
Start: 2021-07-13 | End: 2021-07-13 | Stop reason: HOSPADM

## 2021-07-13 RX ORDER — SODIUM CHLORIDE 0.9 % (FLUSH) 0.9 %
10 SYRINGE (ML) INJECTION
Status: DISCONTINUED | OUTPATIENT
Start: 2021-07-13 | End: 2021-07-13 | Stop reason: HOSPADM

## 2021-07-13 RX ADMIN — Medication 10 ML: at 08:07

## 2021-07-13 RX ADMIN — HEPARIN 500 UNITS: 100 SYRINGE at 08:07

## 2021-07-14 DIAGNOSIS — C50.919 METASTATIC BREAST CANCER: ICD-10-CM

## 2021-07-14 DIAGNOSIS — C50.111 MALIGNANT NEOPLASM OF CENTRAL PORTION OF RIGHT BREAST IN FEMALE, ESTROGEN RECEPTOR NEGATIVE: ICD-10-CM

## 2021-07-14 DIAGNOSIS — Z17.1 MALIGNANT NEOPLASM OF CENTRAL PORTION OF RIGHT BREAST IN FEMALE, ESTROGEN RECEPTOR NEGATIVE: ICD-10-CM

## 2021-07-14 DIAGNOSIS — C50.919 RECURRENT BREAST CANCER, UNSPECIFIED LATERALITY: Primary | ICD-10-CM

## 2021-07-14 DIAGNOSIS — E03.2 HYPOTHYROIDISM DUE TO DRUGS: ICD-10-CM

## 2021-07-14 DIAGNOSIS — C34.91 SQUAMOUS CELL CARCINOMA OF RIGHT LUNG: ICD-10-CM

## 2021-07-18 DIAGNOSIS — C50.919 RECURRENT BREAST CANCER, UNSPECIFIED LATERALITY: Primary | ICD-10-CM

## 2021-07-18 DIAGNOSIS — R53.83 OTHER FATIGUE: ICD-10-CM

## 2021-07-18 RX ORDER — HEPARIN 100 UNIT/ML
500 SYRINGE INTRAVENOUS
Status: CANCELLED | OUTPATIENT
Start: 2021-07-18

## 2021-07-18 RX ORDER — SODIUM CHLORIDE 0.9 % (FLUSH) 0.9 %
10 SYRINGE (ML) INJECTION
Status: CANCELLED | OUTPATIENT
Start: 2021-07-18

## 2021-07-22 ENCOUNTER — INFUSION (OUTPATIENT)
Dept: INFUSION THERAPY | Facility: HOSPITAL | Age: 76
End: 2021-07-22
Attending: INTERNAL MEDICINE
Payer: MEDICARE

## 2021-07-22 VITALS
TEMPERATURE: 98 F | OXYGEN SATURATION: 98 % | HEART RATE: 56 BPM | SYSTOLIC BLOOD PRESSURE: 139 MMHG | RESPIRATION RATE: 16 BRPM | DIASTOLIC BLOOD PRESSURE: 67 MMHG

## 2021-07-22 DIAGNOSIS — C50.919 METASTATIC BREAST CANCER: Primary | ICD-10-CM

## 2021-07-22 PROCEDURE — 96413 CHEMO IV INFUSION 1 HR: CPT

## 2021-07-22 PROCEDURE — 25000003 PHARM REV CODE 250: Performed by: INTERNAL MEDICINE

## 2021-07-22 PROCEDURE — 63600175 PHARM REV CODE 636 W HCPCS: Mod: JG | Performed by: INTERNAL MEDICINE

## 2021-07-22 PROCEDURE — A4216 STERILE WATER/SALINE, 10 ML: HCPCS | Performed by: INTERNAL MEDICINE

## 2021-07-22 RX ORDER — SODIUM CHLORIDE 0.9 % (FLUSH) 0.9 %
10 SYRINGE (ML) INJECTION
Status: DISCONTINUED | OUTPATIENT
Start: 2021-07-22 | End: 2021-07-22 | Stop reason: HOSPADM

## 2021-07-22 RX ORDER — HEPARIN 100 UNIT/ML
500 SYRINGE INTRAVENOUS
Status: DISCONTINUED | OUTPATIENT
Start: 2021-07-22 | End: 2021-07-22 | Stop reason: HOSPADM

## 2021-07-22 RX ADMIN — HEPARIN 500 UNITS: 100 SYRINGE at 09:07

## 2021-07-22 RX ADMIN — Medication 10 ML: at 09:07

## 2021-07-22 RX ADMIN — SODIUM CHLORIDE 200 MG: 9 INJECTION, SOLUTION INTRAVENOUS at 08:07

## 2021-08-04 NOTE — NURSING
In preparation for discharge, d/c'ed patient's tele monitor,  NSR prior to removal, d/c'ed patient's saline lock, applied pressure to site, secured site with tape and gauze. Discharge instructions given to patient and sister at bedside. Patient verbalized understanding of instructions. Patient states willingness to comply.      Number Of Hemigard Strips Per Side: 1

## 2021-08-12 ENCOUNTER — INFUSION (OUTPATIENT)
Dept: INFUSION THERAPY | Facility: HOSPITAL | Age: 76
End: 2021-08-12
Attending: INTERNAL MEDICINE
Payer: MEDICARE

## 2021-08-12 ENCOUNTER — LAB VISIT (OUTPATIENT)
Dept: LAB | Facility: HOSPITAL | Age: 76
End: 2021-08-12
Attending: INTERNAL MEDICINE
Payer: MEDICARE

## 2021-08-12 ENCOUNTER — OFFICE VISIT (OUTPATIENT)
Dept: HEMATOLOGY/ONCOLOGY | Facility: CLINIC | Age: 76
End: 2021-08-12
Payer: MEDICARE

## 2021-08-12 VITALS
HEIGHT: 63 IN | WEIGHT: 173.75 LBS | SYSTOLIC BLOOD PRESSURE: 142 MMHG | HEART RATE: 67 BPM | TEMPERATURE: 98 F | OXYGEN SATURATION: 97 % | DIASTOLIC BLOOD PRESSURE: 65 MMHG | BODY MASS INDEX: 30.79 KG/M2

## 2021-08-12 DIAGNOSIS — D84.821 IMMUNODEFICIENCY DUE TO CHEMOTHERAPY: ICD-10-CM

## 2021-08-12 DIAGNOSIS — R59.0 AXILLARY LYMPHADENOPATHY: ICD-10-CM

## 2021-08-12 DIAGNOSIS — R53.83 FATIGUE, UNSPECIFIED TYPE: ICD-10-CM

## 2021-08-12 DIAGNOSIS — Z79.899 IMMUNODEFICIENCY DUE TO CHEMOTHERAPY: ICD-10-CM

## 2021-08-12 DIAGNOSIS — Z85.118 HISTORY OF LUNG CANCER: ICD-10-CM

## 2021-08-12 DIAGNOSIS — C50.919 RECURRENT BREAST CANCER, UNSPECIFIED LATERALITY: ICD-10-CM

## 2021-08-12 DIAGNOSIS — T45.1X5A IMMUNODEFICIENCY DUE TO CHEMOTHERAPY: ICD-10-CM

## 2021-08-12 DIAGNOSIS — R53.83 OTHER FATIGUE: ICD-10-CM

## 2021-08-12 DIAGNOSIS — J44.9 CHRONIC OBSTRUCTIVE PULMONARY DISEASE, UNSPECIFIED COPD TYPE: ICD-10-CM

## 2021-08-12 DIAGNOSIS — C50.919 RECURRENT BREAST CANCER, UNSPECIFIED LATERALITY: Primary | ICD-10-CM

## 2021-08-12 DIAGNOSIS — C50.919 METASTATIC BREAST CANCER: Primary | ICD-10-CM

## 2021-08-12 LAB
ALBUMIN SERPL BCP-MCNC: 3.8 G/DL (ref 3.5–5.2)
ALP SERPL-CCNC: 53 U/L (ref 55–135)
ALT SERPL W/O P-5'-P-CCNC: 15 U/L (ref 10–44)
ANION GAP SERPL CALC-SCNC: 7 MMOL/L (ref 8–16)
AST SERPL-CCNC: 17 U/L (ref 10–40)
BASOPHILS # BLD AUTO: 0.04 K/UL (ref 0–0.2)
BASOPHILS NFR BLD: 0.9 % (ref 0–1.9)
BILIRUB SERPL-MCNC: 0.5 MG/DL (ref 0.1–1)
BUN SERPL-MCNC: 16 MG/DL (ref 8–23)
CALCIUM SERPL-MCNC: 9.5 MG/DL (ref 8.7–10.5)
CHLORIDE SERPL-SCNC: 103 MMOL/L (ref 95–110)
CO2 SERPL-SCNC: 28 MMOL/L (ref 23–29)
CREAT SERPL-MCNC: 0.8 MG/DL (ref 0.5–1.4)
DIFFERENTIAL METHOD: ABNORMAL
EOSINOPHIL # BLD AUTO: 0.1 K/UL (ref 0–0.5)
EOSINOPHIL NFR BLD: 2.6 % (ref 0–8)
ERYTHROCYTE [DISTWIDTH] IN BLOOD BY AUTOMATED COUNT: 13.6 % (ref 11.5–14.5)
EST. GFR  (AFRICAN AMERICAN): >60 ML/MIN/1.73 M^2
EST. GFR  (NON AFRICAN AMERICAN): >60 ML/MIN/1.73 M^2
GLUCOSE SERPL-MCNC: 133 MG/DL (ref 70–110)
HCT VFR BLD AUTO: 40.7 % (ref 37–48.5)
HGB BLD-MCNC: 13.7 G/DL (ref 12–16)
IMM GRANULOCYTES # BLD AUTO: 0.02 K/UL (ref 0–0.04)
IMM GRANULOCYTES NFR BLD AUTO: 0.4 % (ref 0–0.5)
LYMPHOCYTES # BLD AUTO: 1.2 K/UL (ref 1–4.8)
LYMPHOCYTES NFR BLD: 26 % (ref 18–48)
MCH RBC QN AUTO: 31.2 PG (ref 27–31)
MCHC RBC AUTO-ENTMCNC: 33.7 G/DL (ref 32–36)
MCV RBC AUTO: 93 FL (ref 82–98)
MONOCYTES # BLD AUTO: 0.5 K/UL (ref 0.3–1)
MONOCYTES NFR BLD: 10.3 % (ref 4–15)
NEUTROPHILS # BLD AUTO: 2.8 K/UL (ref 1.8–7.7)
NEUTROPHILS NFR BLD: 59.8 % (ref 38–73)
NRBC BLD-RTO: 0 /100 WBC
PLATELET # BLD AUTO: 200 K/UL (ref 150–450)
PMV BLD AUTO: 9.4 FL (ref 9.2–12.9)
POTASSIUM SERPL-SCNC: 3.7 MMOL/L (ref 3.5–5.1)
PROT SERPL-MCNC: 6.7 G/DL (ref 6–8.4)
RBC # BLD AUTO: 4.39 M/UL (ref 4–5.4)
SODIUM SERPL-SCNC: 138 MMOL/L (ref 136–145)
TSH SERPL DL<=0.005 MIU/L-ACNC: 2.64 UIU/ML (ref 0.4–4)
WBC # BLD AUTO: 4.65 K/UL (ref 3.9–12.7)

## 2021-08-12 PROCEDURE — 3077F PR MOST RECENT SYSTOLIC BLOOD PRESSURE >= 140 MM HG: ICD-10-PCS | Mod: CPTII,S$GLB,, | Performed by: INTERNAL MEDICINE

## 2021-08-12 PROCEDURE — 1101F PT FALLS ASSESS-DOCD LE1/YR: CPT | Mod: CPTII,S$GLB,, | Performed by: INTERNAL MEDICINE

## 2021-08-12 PROCEDURE — 1157F ADVNC CARE PLAN IN RCRD: CPT | Mod: CPTII,S$GLB,, | Performed by: INTERNAL MEDICINE

## 2021-08-12 PROCEDURE — 1159F MED LIST DOCD IN RCRD: CPT | Mod: CPTII,S$GLB,, | Performed by: INTERNAL MEDICINE

## 2021-08-12 PROCEDURE — 1159F PR MEDICATION LIST DOCUMENTED IN MEDICAL RECORD: ICD-10-PCS | Mod: CPTII,S$GLB,, | Performed by: INTERNAL MEDICINE

## 2021-08-12 PROCEDURE — 3078F DIAST BP <80 MM HG: CPT | Mod: CPTII,S$GLB,, | Performed by: INTERNAL MEDICINE

## 2021-08-12 PROCEDURE — 85025 COMPLETE CBC W/AUTO DIFF WBC: CPT | Performed by: INTERNAL MEDICINE

## 2021-08-12 PROCEDURE — A4216 STERILE WATER/SALINE, 10 ML: HCPCS | Performed by: INTERNAL MEDICINE

## 2021-08-12 PROCEDURE — 99215 OFFICE O/P EST HI 40 MIN: CPT | Mod: S$GLB,,, | Performed by: INTERNAL MEDICINE

## 2021-08-12 PROCEDURE — 1157F PR ADVANCE CARE PLAN OR EQUIV PRESENT IN MEDICAL RECORD: ICD-10-PCS | Mod: CPTII,S$GLB,, | Performed by: INTERNAL MEDICINE

## 2021-08-12 PROCEDURE — 3288F FALL RISK ASSESSMENT DOCD: CPT | Mod: CPTII,S$GLB,, | Performed by: INTERNAL MEDICINE

## 2021-08-12 PROCEDURE — 3288F PR FALLS RISK ASSESSMENT DOCUMENTED: ICD-10-PCS | Mod: CPTII,S$GLB,, | Performed by: INTERNAL MEDICINE

## 2021-08-12 PROCEDURE — 84443 ASSAY THYROID STIM HORMONE: CPT | Performed by: INTERNAL MEDICINE

## 2021-08-12 PROCEDURE — 3044F PR MOST RECENT HEMOGLOBIN A1C LEVEL <7.0%: ICD-10-PCS | Mod: CPTII,S$GLB,, | Performed by: INTERNAL MEDICINE

## 2021-08-12 PROCEDURE — 36415 COLL VENOUS BLD VENIPUNCTURE: CPT | Performed by: INTERNAL MEDICINE

## 2021-08-12 PROCEDURE — 99999 PR PBB SHADOW E&M-EST. PATIENT-LVL IV: CPT | Mod: PBBFAC,,, | Performed by: INTERNAL MEDICINE

## 2021-08-12 PROCEDURE — 99999 PR PBB SHADOW E&M-EST. PATIENT-LVL IV: ICD-10-PCS | Mod: PBBFAC,,, | Performed by: INTERNAL MEDICINE

## 2021-08-12 PROCEDURE — 96413 CHEMO IV INFUSION 1 HR: CPT

## 2021-08-12 PROCEDURE — 1126F PR PAIN SEVERITY QUANTIFIED, NO PAIN PRESENT: ICD-10-PCS | Mod: CPTII,S$GLB,, | Performed by: INTERNAL MEDICINE

## 2021-08-12 PROCEDURE — 3008F PR BODY MASS INDEX (BMI) DOCUMENTED: ICD-10-PCS | Mod: CPTII,S$GLB,, | Performed by: INTERNAL MEDICINE

## 2021-08-12 PROCEDURE — 80053 COMPREHEN METABOLIC PANEL: CPT | Performed by: INTERNAL MEDICINE

## 2021-08-12 PROCEDURE — 3008F BODY MASS INDEX DOCD: CPT | Mod: CPTII,S$GLB,, | Performed by: INTERNAL MEDICINE

## 2021-08-12 PROCEDURE — 1126F AMNT PAIN NOTED NONE PRSNT: CPT | Mod: CPTII,S$GLB,, | Performed by: INTERNAL MEDICINE

## 2021-08-12 PROCEDURE — 63600175 PHARM REV CODE 636 W HCPCS: Performed by: INTERNAL MEDICINE

## 2021-08-12 PROCEDURE — 3044F HG A1C LEVEL LT 7.0%: CPT | Mod: CPTII,S$GLB,, | Performed by: INTERNAL MEDICINE

## 2021-08-12 PROCEDURE — 25000003 PHARM REV CODE 250: Performed by: INTERNAL MEDICINE

## 2021-08-12 PROCEDURE — 1101F PR PT FALLS ASSESS DOC 0-1 FALLS W/OUT INJ PAST YR: ICD-10-PCS | Mod: CPTII,S$GLB,, | Performed by: INTERNAL MEDICINE

## 2021-08-12 PROCEDURE — 99215 PR OFFICE/OUTPT VISIT, EST, LEVL V, 40-54 MIN: ICD-10-PCS | Mod: S$GLB,,, | Performed by: INTERNAL MEDICINE

## 2021-08-12 PROCEDURE — 3078F PR MOST RECENT DIASTOLIC BLOOD PRESSURE < 80 MM HG: ICD-10-PCS | Mod: CPTII,S$GLB,, | Performed by: INTERNAL MEDICINE

## 2021-08-12 PROCEDURE — 3077F SYST BP >= 140 MM HG: CPT | Mod: CPTII,S$GLB,, | Performed by: INTERNAL MEDICINE

## 2021-08-12 RX ORDER — HEPARIN 100 UNIT/ML
500 SYRINGE INTRAVENOUS
Status: DISCONTINUED | OUTPATIENT
Start: 2021-08-12 | End: 2021-08-12 | Stop reason: HOSPADM

## 2021-08-12 RX ORDER — SODIUM CHLORIDE 0.9 % (FLUSH) 0.9 %
10 SYRINGE (ML) INJECTION
Status: CANCELLED | OUTPATIENT
Start: 2021-08-12

## 2021-08-12 RX ORDER — SODIUM CHLORIDE 0.9 % (FLUSH) 0.9 %
10 SYRINGE (ML) INJECTION
Status: DISCONTINUED | OUTPATIENT
Start: 2021-08-12 | End: 2021-08-12 | Stop reason: HOSPADM

## 2021-08-12 RX ORDER — HEPARIN 100 UNIT/ML
500 SYRINGE INTRAVENOUS
Status: CANCELLED | OUTPATIENT
Start: 2021-08-12

## 2021-08-12 RX ADMIN — Medication 10 ML: at 10:08

## 2021-08-12 RX ADMIN — SODIUM CHLORIDE 200 MG: 9 INJECTION, SOLUTION INTRAVENOUS at 10:08

## 2021-08-12 RX ADMIN — HEPARIN 500 UNITS: 100 SYRINGE at 10:08

## 2021-08-17 ENCOUNTER — TELEPHONE (OUTPATIENT)
Dept: HEMATOLOGY/ONCOLOGY | Facility: CLINIC | Age: 76
End: 2021-08-17

## 2021-09-07 ENCOUNTER — LAB VISIT (OUTPATIENT)
Dept: LAB | Facility: HOSPITAL | Age: 76
End: 2021-09-07
Attending: INTERNAL MEDICINE
Payer: MEDICARE

## 2021-09-07 DIAGNOSIS — R53.83 FATIGUE, UNSPECIFIED TYPE: ICD-10-CM

## 2021-09-07 DIAGNOSIS — C50.919 RECURRENT BREAST CANCER, UNSPECIFIED LATERALITY: ICD-10-CM

## 2021-09-07 LAB
ALBUMIN SERPL BCP-MCNC: 4.1 G/DL (ref 3.5–5.2)
ALP SERPL-CCNC: 43 U/L (ref 55–135)
ALT SERPL W/O P-5'-P-CCNC: 18 U/L (ref 10–44)
ANION GAP SERPL CALC-SCNC: 13 MMOL/L (ref 8–16)
AST SERPL-CCNC: 20 U/L (ref 10–40)
BASOPHILS # BLD AUTO: 0.06 K/UL (ref 0–0.2)
BASOPHILS NFR BLD: 1.1 % (ref 0–1.9)
BILIRUB SERPL-MCNC: 0.6 MG/DL (ref 0.1–1)
BUN SERPL-MCNC: 13 MG/DL (ref 8–23)
CALCIUM SERPL-MCNC: 9.7 MG/DL (ref 8.7–10.5)
CHLORIDE SERPL-SCNC: 101 MMOL/L (ref 95–110)
CO2 SERPL-SCNC: 25 MMOL/L (ref 23–29)
CREAT SERPL-MCNC: 0.9 MG/DL (ref 0.5–1.4)
DIFFERENTIAL METHOD: ABNORMAL
EOSINOPHIL # BLD AUTO: 0.1 K/UL (ref 0–0.5)
EOSINOPHIL NFR BLD: 2.4 % (ref 0–8)
ERYTHROCYTE [DISTWIDTH] IN BLOOD BY AUTOMATED COUNT: 13.6 % (ref 11.5–14.5)
EST. GFR  (AFRICAN AMERICAN): >60 ML/MIN/1.73 M^2
EST. GFR  (NON AFRICAN AMERICAN): >60 ML/MIN/1.73 M^2
GLUCOSE SERPL-MCNC: 124 MG/DL (ref 70–110)
HCT VFR BLD AUTO: 43.9 % (ref 37–48.5)
HGB BLD-MCNC: 14.5 G/DL (ref 12–16)
IMM GRANULOCYTES # BLD AUTO: 0.03 K/UL (ref 0–0.04)
IMM GRANULOCYTES NFR BLD AUTO: 0.6 % (ref 0–0.5)
LYMPHOCYTES # BLD AUTO: 1.4 K/UL (ref 1–4.8)
LYMPHOCYTES NFR BLD: 25.9 % (ref 18–48)
MCH RBC QN AUTO: 30.5 PG (ref 27–31)
MCHC RBC AUTO-ENTMCNC: 33 G/DL (ref 32–36)
MCV RBC AUTO: 92 FL (ref 82–98)
MONOCYTES # BLD AUTO: 0.5 K/UL (ref 0.3–1)
MONOCYTES NFR BLD: 8.4 % (ref 4–15)
NEUTROPHILS # BLD AUTO: 3.4 K/UL (ref 1.8–7.7)
NEUTROPHILS NFR BLD: 61.6 % (ref 38–73)
NRBC BLD-RTO: 0 /100 WBC
PLATELET # BLD AUTO: 222 K/UL (ref 150–450)
PMV BLD AUTO: 9.1 FL (ref 9.2–12.9)
POTASSIUM SERPL-SCNC: 3.8 MMOL/L (ref 3.5–5.1)
PROT SERPL-MCNC: 7.2 G/DL (ref 6–8.4)
RBC # BLD AUTO: 4.75 M/UL (ref 4–5.4)
SODIUM SERPL-SCNC: 139 MMOL/L (ref 136–145)
TSH SERPL DL<=0.005 MIU/L-ACNC: 1.04 UIU/ML (ref 0.4–4)
WBC # BLD AUTO: 5.45 K/UL (ref 3.9–12.7)

## 2021-09-07 PROCEDURE — 84443 ASSAY THYROID STIM HORMONE: CPT | Performed by: INTERNAL MEDICINE

## 2021-09-07 PROCEDURE — 80053 COMPREHEN METABOLIC PANEL: CPT | Performed by: INTERNAL MEDICINE

## 2021-09-07 PROCEDURE — 85025 COMPLETE CBC W/AUTO DIFF WBC: CPT | Performed by: INTERNAL MEDICINE

## 2021-09-07 PROCEDURE — 36415 COLL VENOUS BLD VENIPUNCTURE: CPT | Performed by: INTERNAL MEDICINE

## 2021-09-08 ENCOUNTER — INFUSION (OUTPATIENT)
Dept: INFUSION THERAPY | Facility: HOSPITAL | Age: 76
End: 2021-09-08
Attending: INTERNAL MEDICINE
Payer: MEDICARE

## 2021-09-08 ENCOUNTER — OFFICE VISIT (OUTPATIENT)
Dept: HEMATOLOGY/ONCOLOGY | Facility: CLINIC | Age: 76
End: 2021-09-08
Payer: MEDICARE

## 2021-09-08 VITALS
BODY MASS INDEX: 29.8 KG/M2 | HEIGHT: 63 IN | TEMPERATURE: 97 F | WEIGHT: 168.19 LBS | OXYGEN SATURATION: 96 % | DIASTOLIC BLOOD PRESSURE: 64 MMHG | SYSTOLIC BLOOD PRESSURE: 101 MMHG | HEART RATE: 66 BPM

## 2021-09-08 VITALS
RESPIRATION RATE: 16 BRPM | DIASTOLIC BLOOD PRESSURE: 59 MMHG | OXYGEN SATURATION: 100 % | SYSTOLIC BLOOD PRESSURE: 110 MMHG | TEMPERATURE: 98 F | HEART RATE: 73 BPM

## 2021-09-08 DIAGNOSIS — C50.919 RECURRENT BREAST CANCER, UNSPECIFIED LATERALITY: ICD-10-CM

## 2021-09-08 DIAGNOSIS — Z79.899 IMMUNODEFICIENCY DUE TO CHEMOTHERAPY: ICD-10-CM

## 2021-09-08 DIAGNOSIS — R53.83 FATIGUE, UNSPECIFIED TYPE: ICD-10-CM

## 2021-09-08 DIAGNOSIS — C50.111 MALIGNANT NEOPLASM OF CENTRAL PORTION OF RIGHT BREAST IN FEMALE, ESTROGEN RECEPTOR NEGATIVE: Primary | ICD-10-CM

## 2021-09-08 DIAGNOSIS — J44.9 CHRONIC OBSTRUCTIVE PULMONARY DISEASE, UNSPECIFIED COPD TYPE: ICD-10-CM

## 2021-09-08 DIAGNOSIS — Z17.1 MALIGNANT NEOPLASM OF CENTRAL PORTION OF RIGHT BREAST IN FEMALE, ESTROGEN RECEPTOR NEGATIVE: Primary | ICD-10-CM

## 2021-09-08 DIAGNOSIS — T45.1X5A IMMUNODEFICIENCY DUE TO CHEMOTHERAPY: ICD-10-CM

## 2021-09-08 DIAGNOSIS — D84.821 IMMUNODEFICIENCY DUE TO CHEMOTHERAPY: ICD-10-CM

## 2021-09-08 DIAGNOSIS — R59.0 AXILLARY LYMPHADENOPATHY: ICD-10-CM

## 2021-09-08 DIAGNOSIS — Z85.118 HISTORY OF LUNG CANCER: ICD-10-CM

## 2021-09-08 DIAGNOSIS — C50.919 METASTATIC BREAST CANCER: Primary | ICD-10-CM

## 2021-09-08 PROCEDURE — 3074F SYST BP LT 130 MM HG: CPT | Mod: CPTII,S$GLB,, | Performed by: INTERNAL MEDICINE

## 2021-09-08 PROCEDURE — 3044F HG A1C LEVEL LT 7.0%: CPT | Mod: CPTII,S$GLB,, | Performed by: INTERNAL MEDICINE

## 2021-09-08 PROCEDURE — 1159F MED LIST DOCD IN RCRD: CPT | Mod: CPTII,S$GLB,, | Performed by: INTERNAL MEDICINE

## 2021-09-08 PROCEDURE — 1126F AMNT PAIN NOTED NONE PRSNT: CPT | Mod: CPTII,S$GLB,, | Performed by: INTERNAL MEDICINE

## 2021-09-08 PROCEDURE — 3044F PR MOST RECENT HEMOGLOBIN A1C LEVEL <7.0%: ICD-10-PCS | Mod: CPTII,S$GLB,, | Performed by: INTERNAL MEDICINE

## 2021-09-08 PROCEDURE — 63600175 PHARM REV CODE 636 W HCPCS: Performed by: INTERNAL MEDICINE

## 2021-09-08 PROCEDURE — 1101F PT FALLS ASSESS-DOCD LE1/YR: CPT | Mod: CPTII,S$GLB,, | Performed by: INTERNAL MEDICINE

## 2021-09-08 PROCEDURE — 1157F PR ADVANCE CARE PLAN OR EQUIV PRESENT IN MEDICAL RECORD: ICD-10-PCS | Mod: CPTII,S$GLB,, | Performed by: INTERNAL MEDICINE

## 2021-09-08 PROCEDURE — 3288F FALL RISK ASSESSMENT DOCD: CPT | Mod: CPTII,S$GLB,, | Performed by: INTERNAL MEDICINE

## 2021-09-08 PROCEDURE — 3288F PR FALLS RISK ASSESSMENT DOCUMENTED: ICD-10-PCS | Mod: CPTII,S$GLB,, | Performed by: INTERNAL MEDICINE

## 2021-09-08 PROCEDURE — 1159F PR MEDICATION LIST DOCUMENTED IN MEDICAL RECORD: ICD-10-PCS | Mod: CPTII,S$GLB,, | Performed by: INTERNAL MEDICINE

## 2021-09-08 PROCEDURE — 99999 PR PBB SHADOW E&M-EST. PATIENT-LVL IV: CPT | Mod: PBBFAC,,, | Performed by: INTERNAL MEDICINE

## 2021-09-08 PROCEDURE — 25000003 PHARM REV CODE 250: Performed by: INTERNAL MEDICINE

## 2021-09-08 PROCEDURE — 1126F PR PAIN SEVERITY QUANTIFIED, NO PAIN PRESENT: ICD-10-PCS | Mod: CPTII,S$GLB,, | Performed by: INTERNAL MEDICINE

## 2021-09-08 PROCEDURE — 3078F DIAST BP <80 MM HG: CPT | Mod: CPTII,S$GLB,, | Performed by: INTERNAL MEDICINE

## 2021-09-08 PROCEDURE — 96413 CHEMO IV INFUSION 1 HR: CPT

## 2021-09-08 PROCEDURE — 1101F PR PT FALLS ASSESS DOC 0-1 FALLS W/OUT INJ PAST YR: ICD-10-PCS | Mod: CPTII,S$GLB,, | Performed by: INTERNAL MEDICINE

## 2021-09-08 PROCEDURE — 3008F PR BODY MASS INDEX (BMI) DOCUMENTED: ICD-10-PCS | Mod: CPTII,S$GLB,, | Performed by: INTERNAL MEDICINE

## 2021-09-08 PROCEDURE — 3074F PR MOST RECENT SYSTOLIC BLOOD PRESSURE < 130 MM HG: ICD-10-PCS | Mod: CPTII,S$GLB,, | Performed by: INTERNAL MEDICINE

## 2021-09-08 PROCEDURE — 1157F ADVNC CARE PLAN IN RCRD: CPT | Mod: CPTII,S$GLB,, | Performed by: INTERNAL MEDICINE

## 2021-09-08 PROCEDURE — 99215 PR OFFICE/OUTPT VISIT, EST, LEVL V, 40-54 MIN: ICD-10-PCS | Mod: S$GLB,,, | Performed by: INTERNAL MEDICINE

## 2021-09-08 PROCEDURE — A4216 STERILE WATER/SALINE, 10 ML: HCPCS | Performed by: INTERNAL MEDICINE

## 2021-09-08 PROCEDURE — 3008F BODY MASS INDEX DOCD: CPT | Mod: CPTII,S$GLB,, | Performed by: INTERNAL MEDICINE

## 2021-09-08 PROCEDURE — 99999 PR PBB SHADOW E&M-EST. PATIENT-LVL IV: ICD-10-PCS | Mod: PBBFAC,,, | Performed by: INTERNAL MEDICINE

## 2021-09-08 PROCEDURE — 3078F PR MOST RECENT DIASTOLIC BLOOD PRESSURE < 80 MM HG: ICD-10-PCS | Mod: CPTII,S$GLB,, | Performed by: INTERNAL MEDICINE

## 2021-09-08 PROCEDURE — 99215 OFFICE O/P EST HI 40 MIN: CPT | Mod: S$GLB,,, | Performed by: INTERNAL MEDICINE

## 2021-09-08 RX ORDER — HEPARIN 100 UNIT/ML
500 SYRINGE INTRAVENOUS
Status: CANCELLED | OUTPATIENT
Start: 2021-09-08

## 2021-09-08 RX ORDER — SODIUM CHLORIDE 0.9 % (FLUSH) 0.9 %
10 SYRINGE (ML) INJECTION
Status: DISCONTINUED | OUTPATIENT
Start: 2021-09-08 | End: 2021-09-08 | Stop reason: HOSPADM

## 2021-09-08 RX ORDER — HEPARIN 100 UNIT/ML
500 SYRINGE INTRAVENOUS
Status: DISCONTINUED | OUTPATIENT
Start: 2021-09-08 | End: 2021-09-08 | Stop reason: HOSPADM

## 2021-09-08 RX ORDER — SODIUM CHLORIDE 0.9 % (FLUSH) 0.9 %
10 SYRINGE (ML) INJECTION
Status: CANCELLED | OUTPATIENT
Start: 2021-09-08

## 2021-09-08 RX ADMIN — HEPARIN 500 UNITS: 100 SYRINGE at 12:09

## 2021-09-08 RX ADMIN — Medication 10 ML: at 12:09

## 2021-09-08 RX ADMIN — SODIUM CHLORIDE 200 MG: 9 INJECTION, SOLUTION INTRAVENOUS at 12:09

## 2021-09-28 RX ORDER — HEPARIN 100 UNIT/ML
500 SYRINGE INTRAVENOUS
Status: CANCELLED | OUTPATIENT
Start: 2021-09-29

## 2021-09-28 RX ORDER — SODIUM CHLORIDE 0.9 % (FLUSH) 0.9 %
10 SYRINGE (ML) INJECTION
Status: CANCELLED | OUTPATIENT
Start: 2021-09-29

## 2021-09-29 ENCOUNTER — INFUSION (OUTPATIENT)
Dept: INFUSION THERAPY | Facility: HOSPITAL | Age: 76
End: 2021-09-29
Attending: INTERNAL MEDICINE
Payer: MEDICARE

## 2021-09-29 ENCOUNTER — OFFICE VISIT (OUTPATIENT)
Dept: HEMATOLOGY/ONCOLOGY | Facility: CLINIC | Age: 76
End: 2021-09-29
Payer: MEDICARE

## 2021-09-29 VITALS
DIASTOLIC BLOOD PRESSURE: 81 MMHG | HEART RATE: 61 BPM | SYSTOLIC BLOOD PRESSURE: 121 MMHG | OXYGEN SATURATION: 95 % | TEMPERATURE: 98 F | RESPIRATION RATE: 16 BRPM

## 2021-09-29 VITALS
SYSTOLIC BLOOD PRESSURE: 133 MMHG | DIASTOLIC BLOOD PRESSURE: 73 MMHG | OXYGEN SATURATION: 96 % | HEIGHT: 63 IN | TEMPERATURE: 98 F | BODY MASS INDEX: 29.77 KG/M2 | HEART RATE: 64 BPM | WEIGHT: 168 LBS

## 2021-09-29 DIAGNOSIS — C50.919 METASTATIC BREAST CANCER: Primary | ICD-10-CM

## 2021-09-29 DIAGNOSIS — J44.9 CHRONIC OBSTRUCTIVE PULMONARY DISEASE, UNSPECIFIED COPD TYPE: ICD-10-CM

## 2021-09-29 DIAGNOSIS — R59.0 AXILLARY LYMPHADENOPATHY: ICD-10-CM

## 2021-09-29 DIAGNOSIS — B35.4 TINEA CORPORIS: ICD-10-CM

## 2021-09-29 DIAGNOSIS — C50.919 RECURRENT BREAST CANCER, UNSPECIFIED LATERALITY: Primary | ICD-10-CM

## 2021-09-29 DIAGNOSIS — T45.1X5A IMMUNODEFICIENCY DUE TO CHEMOTHERAPY: ICD-10-CM

## 2021-09-29 DIAGNOSIS — D84.821 IMMUNODEFICIENCY DUE TO CHEMOTHERAPY: ICD-10-CM

## 2021-09-29 DIAGNOSIS — Z79.899 IMMUNODEFICIENCY DUE TO CHEMOTHERAPY: ICD-10-CM

## 2021-09-29 DIAGNOSIS — Z85.118 HISTORY OF LUNG CANCER: ICD-10-CM

## 2021-09-29 PROCEDURE — 3288F PR FALLS RISK ASSESSMENT DOCUMENTED: ICD-10-PCS | Mod: HCNC,CPTII,S$GLB, | Performed by: INTERNAL MEDICINE

## 2021-09-29 PROCEDURE — 1125F AMNT PAIN NOTED PAIN PRSNT: CPT | Mod: HCNC,CPTII,S$GLB, | Performed by: INTERNAL MEDICINE

## 2021-09-29 PROCEDURE — 1125F PR PAIN SEVERITY QUANTIFIED, PAIN PRESENT: ICD-10-PCS | Mod: HCNC,CPTII,S$GLB, | Performed by: INTERNAL MEDICINE

## 2021-09-29 PROCEDURE — 99214 PR OFFICE/OUTPT VISIT, EST, LEVL IV, 30-39 MIN: ICD-10-PCS | Mod: HCNC,S$GLB,, | Performed by: INTERNAL MEDICINE

## 2021-09-29 PROCEDURE — A4216 STERILE WATER/SALINE, 10 ML: HCPCS | Mod: HCNC | Performed by: INTERNAL MEDICINE

## 2021-09-29 PROCEDURE — 3078F DIAST BP <80 MM HG: CPT | Mod: HCNC,CPTII,S$GLB, | Performed by: INTERNAL MEDICINE

## 2021-09-29 PROCEDURE — 99214 OFFICE O/P EST MOD 30 MIN: CPT | Mod: HCNC,S$GLB,, | Performed by: INTERNAL MEDICINE

## 2021-09-29 PROCEDURE — 96413 CHEMO IV INFUSION 1 HR: CPT | Mod: HCNC

## 2021-09-29 PROCEDURE — 1101F PR PT FALLS ASSESS DOC 0-1 FALLS W/OUT INJ PAST YR: ICD-10-PCS | Mod: HCNC,CPTII,S$GLB, | Performed by: INTERNAL MEDICINE

## 2021-09-29 PROCEDURE — 1157F ADVNC CARE PLAN IN RCRD: CPT | Mod: HCNC,CPTII,S$GLB, | Performed by: INTERNAL MEDICINE

## 2021-09-29 PROCEDURE — 3008F BODY MASS INDEX DOCD: CPT | Mod: HCNC,CPTII,S$GLB, | Performed by: INTERNAL MEDICINE

## 2021-09-29 PROCEDURE — 1101F PT FALLS ASSESS-DOCD LE1/YR: CPT | Mod: HCNC,CPTII,S$GLB, | Performed by: INTERNAL MEDICINE

## 2021-09-29 PROCEDURE — 99999 PR PBB SHADOW E&M-EST. PATIENT-LVL III: ICD-10-PCS | Mod: PBBFAC,HCNC,, | Performed by: INTERNAL MEDICINE

## 2021-09-29 PROCEDURE — 3075F PR MOST RECENT SYSTOLIC BLOOD PRESS GE 130-139MM HG: ICD-10-PCS | Mod: HCNC,CPTII,S$GLB, | Performed by: INTERNAL MEDICINE

## 2021-09-29 PROCEDURE — 3044F PR MOST RECENT HEMOGLOBIN A1C LEVEL <7.0%: ICD-10-PCS | Mod: HCNC,CPTII,S$GLB, | Performed by: INTERNAL MEDICINE

## 2021-09-29 PROCEDURE — 63600175 PHARM REV CODE 636 W HCPCS: Mod: JG,HCNC | Performed by: INTERNAL MEDICINE

## 2021-09-29 PROCEDURE — 25000003 PHARM REV CODE 250: Mod: HCNC | Performed by: INTERNAL MEDICINE

## 2021-09-29 PROCEDURE — 3288F FALL RISK ASSESSMENT DOCD: CPT | Mod: HCNC,CPTII,S$GLB, | Performed by: INTERNAL MEDICINE

## 2021-09-29 PROCEDURE — 3075F SYST BP GE 130 - 139MM HG: CPT | Mod: HCNC,CPTII,S$GLB, | Performed by: INTERNAL MEDICINE

## 2021-09-29 PROCEDURE — 99999 PR PBB SHADOW E&M-EST. PATIENT-LVL III: CPT | Mod: PBBFAC,HCNC,, | Performed by: INTERNAL MEDICINE

## 2021-09-29 PROCEDURE — 3078F PR MOST RECENT DIASTOLIC BLOOD PRESSURE < 80 MM HG: ICD-10-PCS | Mod: HCNC,CPTII,S$GLB, | Performed by: INTERNAL MEDICINE

## 2021-09-29 PROCEDURE — 1157F PR ADVANCE CARE PLAN OR EQUIV PRESENT IN MEDICAL RECORD: ICD-10-PCS | Mod: HCNC,CPTII,S$GLB, | Performed by: INTERNAL MEDICINE

## 2021-09-29 PROCEDURE — 3044F HG A1C LEVEL LT 7.0%: CPT | Mod: HCNC,CPTII,S$GLB, | Performed by: INTERNAL MEDICINE

## 2021-09-29 PROCEDURE — 3008F PR BODY MASS INDEX (BMI) DOCUMENTED: ICD-10-PCS | Mod: HCNC,CPTII,S$GLB, | Performed by: INTERNAL MEDICINE

## 2021-09-29 RX ORDER — SODIUM CHLORIDE 0.9 % (FLUSH) 0.9 %
10 SYRINGE (ML) INJECTION
Status: DISCONTINUED | OUTPATIENT
Start: 2021-09-29 | End: 2021-09-29 | Stop reason: HOSPADM

## 2021-09-29 RX ORDER — HEPARIN 100 UNIT/ML
500 SYRINGE INTRAVENOUS
Status: DISCONTINUED | OUTPATIENT
Start: 2021-09-29 | End: 2021-09-29 | Stop reason: HOSPADM

## 2021-09-29 RX ORDER — NYSTATIN 100000 U/G
CREAM TOPICAL 2 TIMES DAILY
Qty: 1 TUBE | Refills: 1 | Status: SHIPPED | OUTPATIENT
Start: 2021-09-29 | End: 2021-11-03

## 2021-09-29 RX ADMIN — SODIUM CHLORIDE 200 MG: 9 INJECTION, SOLUTION INTRAVENOUS at 08:09

## 2021-09-29 RX ADMIN — HEPARIN 500 UNITS: 100 SYRINGE at 09:09

## 2021-09-29 RX ADMIN — Medication 10 ML: at 09:09

## 2021-10-14 ENCOUNTER — HOSPITAL ENCOUNTER (OUTPATIENT)
Dept: RADIOLOGY | Facility: HOSPITAL | Age: 76
Discharge: HOME | End: 2021-10-14
Attending: INTERNAL MEDICINE
Payer: MEDICARE

## 2021-10-14 DIAGNOSIS — C50.111 MALIGNANT NEOPLASM OF CENTRAL PORTION OF RIGHT BREAST IN FEMALE, ESTROGEN RECEPTOR NEGATIVE: ICD-10-CM

## 2021-10-14 DIAGNOSIS — Z17.1 MALIGNANT NEOPLASM OF CENTRAL PORTION OF RIGHT BREAST IN FEMALE, ESTROGEN RECEPTOR NEGATIVE: ICD-10-CM

## 2021-10-14 PROCEDURE — 78815 NM PET CT ROUTINE: ICD-10-PCS | Mod: 26,PS,HCNC, | Performed by: RADIOLOGY

## 2021-10-14 PROCEDURE — 78815 PET IMAGE W/CT SKULL-THIGH: CPT | Mod: TC,HCNC

## 2021-10-14 PROCEDURE — 78815 PET IMAGE W/CT SKULL-THIGH: CPT | Mod: 26,PS,HCNC, | Performed by: RADIOLOGY

## 2021-10-20 ENCOUNTER — LAB VISIT (OUTPATIENT)
Dept: LAB | Facility: HOSPITAL | Age: 76
End: 2021-10-20
Attending: INTERNAL MEDICINE
Payer: MEDICARE

## 2021-10-20 ENCOUNTER — INFUSION (OUTPATIENT)
Dept: INFUSION THERAPY | Facility: HOSPITAL | Age: 76
End: 2021-10-20
Attending: INTERNAL MEDICINE
Payer: MEDICARE

## 2021-10-20 ENCOUNTER — OFFICE VISIT (OUTPATIENT)
Dept: HEMATOLOGY/ONCOLOGY | Facility: CLINIC | Age: 76
End: 2021-10-20
Payer: MEDICARE

## 2021-10-20 VITALS
HEART RATE: 59 BPM | OXYGEN SATURATION: 98 % | SYSTOLIC BLOOD PRESSURE: 133 MMHG | WEIGHT: 170.19 LBS | DIASTOLIC BLOOD PRESSURE: 77 MMHG | BODY MASS INDEX: 30.16 KG/M2 | HEIGHT: 63 IN

## 2021-10-20 VITALS
RESPIRATION RATE: 16 BRPM | SYSTOLIC BLOOD PRESSURE: 125 MMHG | DIASTOLIC BLOOD PRESSURE: 73 MMHG | TEMPERATURE: 98 F | OXYGEN SATURATION: 97 % | HEART RATE: 53 BPM

## 2021-10-20 DIAGNOSIS — J44.9 CHRONIC OBSTRUCTIVE PULMONARY DISEASE, UNSPECIFIED COPD TYPE: ICD-10-CM

## 2021-10-20 DIAGNOSIS — R53.83 OTHER FATIGUE: ICD-10-CM

## 2021-10-20 DIAGNOSIS — Z79.899 IMMUNODEFICIENCY DUE TO CHEMOTHERAPY: ICD-10-CM

## 2021-10-20 DIAGNOSIS — D84.821 IMMUNODEFICIENCY DUE TO CHEMOTHERAPY: ICD-10-CM

## 2021-10-20 DIAGNOSIS — T45.1X5A IMMUNODEFICIENCY DUE TO CHEMOTHERAPY: ICD-10-CM

## 2021-10-20 DIAGNOSIS — C50.919 RECURRENT BREAST CANCER, UNSPECIFIED LATERALITY: ICD-10-CM

## 2021-10-20 DIAGNOSIS — C50.919 RECURRENT BREAST CANCER, UNSPECIFIED LATERALITY: Primary | ICD-10-CM

## 2021-10-20 DIAGNOSIS — C50.919 METASTATIC BREAST CANCER: Primary | ICD-10-CM

## 2021-10-20 DIAGNOSIS — Z85.118 HISTORY OF LUNG CANCER: ICD-10-CM

## 2021-10-20 LAB
ALBUMIN SERPL BCP-MCNC: 3.8 G/DL (ref 3.5–5.2)
ALP SERPL-CCNC: 43 U/L (ref 55–135)
ALT SERPL W/O P-5'-P-CCNC: 15 U/L (ref 10–44)
ANION GAP SERPL CALC-SCNC: 9 MMOL/L (ref 8–16)
AST SERPL-CCNC: 15 U/L (ref 10–40)
BASOPHILS # BLD AUTO: 0.04 K/UL (ref 0–0.2)
BASOPHILS NFR BLD: 1 % (ref 0–1.9)
BILIRUB SERPL-MCNC: 0.5 MG/DL (ref 0.1–1)
BUN SERPL-MCNC: 11 MG/DL (ref 8–23)
CALCIUM SERPL-MCNC: 9.7 MG/DL (ref 8.7–10.5)
CHLORIDE SERPL-SCNC: 103 MMOL/L (ref 95–110)
CO2 SERPL-SCNC: 29 MMOL/L (ref 23–29)
CREAT SERPL-MCNC: 0.8 MG/DL (ref 0.5–1.4)
DIFFERENTIAL METHOD: ABNORMAL
EOSINOPHIL # BLD AUTO: 0.1 K/UL (ref 0–0.5)
EOSINOPHIL NFR BLD: 2.4 % (ref 0–8)
ERYTHROCYTE [DISTWIDTH] IN BLOOD BY AUTOMATED COUNT: 14.1 % (ref 11.5–14.5)
EST. GFR  (AFRICAN AMERICAN): >60 ML/MIN/1.73 M^2
EST. GFR  (NON AFRICAN AMERICAN): >60 ML/MIN/1.73 M^2
GLUCOSE SERPL-MCNC: 127 MG/DL (ref 70–110)
HCT VFR BLD AUTO: 43.1 % (ref 37–48.5)
HGB BLD-MCNC: 14.1 G/DL (ref 12–16)
IMM GRANULOCYTES # BLD AUTO: 0.02 K/UL (ref 0–0.04)
IMM GRANULOCYTES NFR BLD AUTO: 0.5 % (ref 0–0.5)
LYMPHOCYTES # BLD AUTO: 1.2 K/UL (ref 1–4.8)
LYMPHOCYTES NFR BLD: 28.3 % (ref 18–48)
MCH RBC QN AUTO: 31.1 PG (ref 27–31)
MCHC RBC AUTO-ENTMCNC: 32.7 G/DL (ref 32–36)
MCV RBC AUTO: 95 FL (ref 82–98)
MONOCYTES # BLD AUTO: 0.4 K/UL (ref 0.3–1)
MONOCYTES NFR BLD: 10.4 % (ref 4–15)
NEUTROPHILS # BLD AUTO: 2.4 K/UL (ref 1.8–7.7)
NEUTROPHILS NFR BLD: 57.4 % (ref 38–73)
NRBC BLD-RTO: 0 /100 WBC
PLATELET # BLD AUTO: 182 K/UL (ref 150–450)
PMV BLD AUTO: 9.8 FL (ref 9.2–12.9)
POTASSIUM SERPL-SCNC: 3.9 MMOL/L (ref 3.5–5.1)
PROT SERPL-MCNC: 6.5 G/DL (ref 6–8.4)
RBC # BLD AUTO: 4.54 M/UL (ref 4–5.4)
SODIUM SERPL-SCNC: 141 MMOL/L (ref 136–145)
TSH SERPL DL<=0.005 MIU/L-ACNC: 3.57 UIU/ML (ref 0.4–4)
WBC # BLD AUTO: 4.13 K/UL (ref 3.9–12.7)

## 2021-10-20 PROCEDURE — 3044F HG A1C LEVEL LT 7.0%: CPT | Mod: HCNC,CPTII,S$GLB, | Performed by: INTERNAL MEDICINE

## 2021-10-20 PROCEDURE — 99999 PR PBB SHADOW E&M-EST. PATIENT-LVL IV: CPT | Mod: PBBFAC,HCNC,, | Performed by: INTERNAL MEDICINE

## 2021-10-20 PROCEDURE — 96413 CHEMO IV INFUSION 1 HR: CPT | Mod: HCNC

## 2021-10-20 PROCEDURE — 3078F DIAST BP <80 MM HG: CPT | Mod: HCNC,CPTII,S$GLB, | Performed by: INTERNAL MEDICINE

## 2021-10-20 PROCEDURE — 3288F PR FALLS RISK ASSESSMENT DOCUMENTED: ICD-10-PCS | Mod: HCNC,CPTII,S$GLB, | Performed by: INTERNAL MEDICINE

## 2021-10-20 PROCEDURE — 1101F PR PT FALLS ASSESS DOC 0-1 FALLS W/OUT INJ PAST YR: ICD-10-PCS | Mod: HCNC,CPTII,S$GLB, | Performed by: INTERNAL MEDICINE

## 2021-10-20 PROCEDURE — 1159F PR MEDICATION LIST DOCUMENTED IN MEDICAL RECORD: ICD-10-PCS | Mod: HCNC,CPTII,S$GLB, | Performed by: INTERNAL MEDICINE

## 2021-10-20 PROCEDURE — A4216 STERILE WATER/SALINE, 10 ML: HCPCS | Mod: HCNC | Performed by: INTERNAL MEDICINE

## 2021-10-20 PROCEDURE — 85025 COMPLETE CBC W/AUTO DIFF WBC: CPT | Mod: HCNC | Performed by: INTERNAL MEDICINE

## 2021-10-20 PROCEDURE — 84443 ASSAY THYROID STIM HORMONE: CPT | Mod: HCNC | Performed by: INTERNAL MEDICINE

## 2021-10-20 PROCEDURE — 1126F AMNT PAIN NOTED NONE PRSNT: CPT | Mod: HCNC,CPTII,S$GLB, | Performed by: INTERNAL MEDICINE

## 2021-10-20 PROCEDURE — 1157F ADVNC CARE PLAN IN RCRD: CPT | Mod: HCNC,CPTII,S$GLB, | Performed by: INTERNAL MEDICINE

## 2021-10-20 PROCEDURE — 3078F PR MOST RECENT DIASTOLIC BLOOD PRESSURE < 80 MM HG: ICD-10-PCS | Mod: HCNC,CPTII,S$GLB, | Performed by: INTERNAL MEDICINE

## 2021-10-20 PROCEDURE — 3044F PR MOST RECENT HEMOGLOBIN A1C LEVEL <7.0%: ICD-10-PCS | Mod: HCNC,CPTII,S$GLB, | Performed by: INTERNAL MEDICINE

## 2021-10-20 PROCEDURE — 3288F FALL RISK ASSESSMENT DOCD: CPT | Mod: HCNC,CPTII,S$GLB, | Performed by: INTERNAL MEDICINE

## 2021-10-20 PROCEDURE — 99999 PR PBB SHADOW E&M-EST. PATIENT-LVL IV: ICD-10-PCS | Mod: PBBFAC,HCNC,, | Performed by: INTERNAL MEDICINE

## 2021-10-20 PROCEDURE — 99499 UNLISTED E&M SERVICE: CPT | Mod: HCNC,S$GLB,, | Performed by: INTERNAL MEDICINE

## 2021-10-20 PROCEDURE — 1157F PR ADVANCE CARE PLAN OR EQUIV PRESENT IN MEDICAL RECORD: ICD-10-PCS | Mod: HCNC,CPTII,S$GLB, | Performed by: INTERNAL MEDICINE

## 2021-10-20 PROCEDURE — 3075F PR MOST RECENT SYSTOLIC BLOOD PRESS GE 130-139MM HG: ICD-10-PCS | Mod: HCNC,CPTII,S$GLB, | Performed by: INTERNAL MEDICINE

## 2021-10-20 PROCEDURE — 1101F PT FALLS ASSESS-DOCD LE1/YR: CPT | Mod: HCNC,CPTII,S$GLB, | Performed by: INTERNAL MEDICINE

## 2021-10-20 PROCEDURE — 1159F MED LIST DOCD IN RCRD: CPT | Mod: HCNC,CPTII,S$GLB, | Performed by: INTERNAL MEDICINE

## 2021-10-20 PROCEDURE — 99214 OFFICE O/P EST MOD 30 MIN: CPT | Mod: HCNC,S$GLB,, | Performed by: INTERNAL MEDICINE

## 2021-10-20 PROCEDURE — 80053 COMPREHEN METABOLIC PANEL: CPT | Mod: HCNC | Performed by: INTERNAL MEDICINE

## 2021-10-20 PROCEDURE — 1126F PR PAIN SEVERITY QUANTIFIED, NO PAIN PRESENT: ICD-10-PCS | Mod: HCNC,CPTII,S$GLB, | Performed by: INTERNAL MEDICINE

## 2021-10-20 PROCEDURE — 99214 PR OFFICE/OUTPT VISIT, EST, LEVL IV, 30-39 MIN: ICD-10-PCS | Mod: HCNC,S$GLB,, | Performed by: INTERNAL MEDICINE

## 2021-10-20 PROCEDURE — 3075F SYST BP GE 130 - 139MM HG: CPT | Mod: HCNC,CPTII,S$GLB, | Performed by: INTERNAL MEDICINE

## 2021-10-20 PROCEDURE — 36415 COLL VENOUS BLD VENIPUNCTURE: CPT | Mod: HCNC | Performed by: INTERNAL MEDICINE

## 2021-10-20 PROCEDURE — 63600175 PHARM REV CODE 636 W HCPCS: Mod: JG,HCNC | Performed by: INTERNAL MEDICINE

## 2021-10-20 PROCEDURE — 25000003 PHARM REV CODE 250: Mod: HCNC | Performed by: INTERNAL MEDICINE

## 2021-10-20 PROCEDURE — 99499 RISK ADDL DX/OHS AUDIT: ICD-10-PCS | Mod: HCNC,S$GLB,, | Performed by: INTERNAL MEDICINE

## 2021-10-20 RX ORDER — SODIUM CHLORIDE 0.9 % (FLUSH) 0.9 %
10 SYRINGE (ML) INJECTION
Status: CANCELLED | OUTPATIENT
Start: 2021-10-20

## 2021-10-20 RX ORDER — SODIUM CHLORIDE 0.9 % (FLUSH) 0.9 %
10 SYRINGE (ML) INJECTION
Status: DISCONTINUED | OUTPATIENT
Start: 2021-10-20 | End: 2021-10-20 | Stop reason: HOSPADM

## 2021-10-20 RX ORDER — HEPARIN 100 UNIT/ML
500 SYRINGE INTRAVENOUS
Status: DISCONTINUED | OUTPATIENT
Start: 2021-10-20 | End: 2021-10-20 | Stop reason: HOSPADM

## 2021-10-20 RX ORDER — HEPARIN 100 UNIT/ML
500 SYRINGE INTRAVENOUS
Status: CANCELLED | OUTPATIENT
Start: 2021-10-20

## 2021-10-20 RX ADMIN — HEPARIN 500 UNITS: 100 SYRINGE at 10:10

## 2021-10-20 RX ADMIN — SODIUM CHLORIDE 200 MG: 9 INJECTION, SOLUTION INTRAVENOUS at 09:10

## 2021-10-20 RX ADMIN — Medication 10 ML: at 10:10

## 2021-10-29 ENCOUNTER — OFFICE VISIT (OUTPATIENT)
Dept: CARDIOLOGY | Facility: CLINIC | Age: 76
End: 2021-10-29
Payer: MEDICARE

## 2021-10-29 VITALS
HEIGHT: 63 IN | BODY MASS INDEX: 29.77 KG/M2 | OXYGEN SATURATION: 98 % | DIASTOLIC BLOOD PRESSURE: 66 MMHG | HEART RATE: 64 BPM | SYSTOLIC BLOOD PRESSURE: 128 MMHG | WEIGHT: 168 LBS

## 2021-10-29 DIAGNOSIS — E78.5 HYPERLIPIDEMIA LDL GOAL <70: ICD-10-CM

## 2021-10-29 DIAGNOSIS — C34.90 SQUAMOUS CELL CARCINOMA OF LUNG, UNSPECIFIED LATERALITY: ICD-10-CM

## 2021-10-29 DIAGNOSIS — C50.919 METASTATIC BREAST CANCER: ICD-10-CM

## 2021-10-29 DIAGNOSIS — I70.0 ATHEROSCLEROSIS OF AORTA: ICD-10-CM

## 2021-10-29 DIAGNOSIS — I25.2 OLD MYOCARDIAL INFARCTION: ICD-10-CM

## 2021-10-29 DIAGNOSIS — R13.10 DYSPHAGIA, UNSPECIFIED TYPE: ICD-10-CM

## 2021-10-29 DIAGNOSIS — I70.0 AORTIC ARCH ATHEROSCLEROSIS: ICD-10-CM

## 2021-10-29 DIAGNOSIS — Z66 DNR (DO NOT RESUSCITATE): ICD-10-CM

## 2021-10-29 DIAGNOSIS — I20.89 STABLE ANGINA: ICD-10-CM

## 2021-10-29 DIAGNOSIS — I11.9 BENIGN HYPERTENSIVE HEART DISEASE WITHOUT HEART FAILURE: ICD-10-CM

## 2021-10-29 DIAGNOSIS — I50.32 CHRONIC HEART FAILURE WITH PRESERVED EJECTION FRACTION: Primary | ICD-10-CM

## 2021-10-29 DIAGNOSIS — I25.10 CORONARY ARTERY DISEASE INVOLVING NATIVE CORONARY ARTERY OF NATIVE HEART WITHOUT ANGINA PECTORIS: ICD-10-CM

## 2021-10-29 PROCEDURE — 3288F FALL RISK ASSESSMENT DOCD: CPT | Mod: HCNC,CPTII,S$GLB, | Performed by: INTERNAL MEDICINE

## 2021-10-29 PROCEDURE — 3288F PR FALLS RISK ASSESSMENT DOCUMENTED: ICD-10-PCS | Mod: HCNC,CPTII,S$GLB, | Performed by: INTERNAL MEDICINE

## 2021-10-29 PROCEDURE — 1101F PR PT FALLS ASSESS DOC 0-1 FALLS W/OUT INJ PAST YR: ICD-10-PCS | Mod: HCNC,CPTII,S$GLB, | Performed by: INTERNAL MEDICINE

## 2021-10-29 PROCEDURE — 1160F RVW MEDS BY RX/DR IN RCRD: CPT | Mod: HCNC,CPTII,S$GLB, | Performed by: INTERNAL MEDICINE

## 2021-10-29 PROCEDURE — 1159F MED LIST DOCD IN RCRD: CPT | Mod: HCNC,CPTII,S$GLB, | Performed by: INTERNAL MEDICINE

## 2021-10-29 PROCEDURE — 3074F SYST BP LT 130 MM HG: CPT | Mod: HCNC,CPTII,S$GLB, | Performed by: INTERNAL MEDICINE

## 2021-10-29 PROCEDURE — 1157F ADVNC CARE PLAN IN RCRD: CPT | Mod: HCNC,CPTII,S$GLB, | Performed by: INTERNAL MEDICINE

## 2021-10-29 PROCEDURE — 99214 PR OFFICE/OUTPT VISIT, EST, LEVL IV, 30-39 MIN: ICD-10-PCS | Mod: HCNC,S$GLB,, | Performed by: INTERNAL MEDICINE

## 2021-10-29 PROCEDURE — 3044F PR MOST RECENT HEMOGLOBIN A1C LEVEL <7.0%: ICD-10-PCS | Mod: HCNC,CPTII,S$GLB, | Performed by: INTERNAL MEDICINE

## 2021-10-29 PROCEDURE — 93000 EKG 12-LEAD: ICD-10-PCS | Mod: HCNC,S$GLB,, | Performed by: INTERNAL MEDICINE

## 2021-10-29 PROCEDURE — 93000 ELECTROCARDIOGRAM COMPLETE: CPT | Mod: HCNC,S$GLB,, | Performed by: INTERNAL MEDICINE

## 2021-10-29 PROCEDURE — 99214 OFFICE O/P EST MOD 30 MIN: CPT | Mod: HCNC,S$GLB,, | Performed by: INTERNAL MEDICINE

## 2021-10-29 PROCEDURE — 3078F DIAST BP <80 MM HG: CPT | Mod: HCNC,CPTII,S$GLB, | Performed by: INTERNAL MEDICINE

## 2021-10-29 PROCEDURE — 99999 PR PBB SHADOW E&M-EST. PATIENT-LVL IV: ICD-10-PCS | Mod: PBBFAC,HCNC,, | Performed by: INTERNAL MEDICINE

## 2021-10-29 PROCEDURE — 1101F PT FALLS ASSESS-DOCD LE1/YR: CPT | Mod: HCNC,CPTII,S$GLB, | Performed by: INTERNAL MEDICINE

## 2021-10-29 PROCEDURE — 1126F AMNT PAIN NOTED NONE PRSNT: CPT | Mod: HCNC,CPTII,S$GLB, | Performed by: INTERNAL MEDICINE

## 2021-10-29 PROCEDURE — 3044F HG A1C LEVEL LT 7.0%: CPT | Mod: HCNC,CPTII,S$GLB, | Performed by: INTERNAL MEDICINE

## 2021-10-29 PROCEDURE — 3074F PR MOST RECENT SYSTOLIC BLOOD PRESSURE < 130 MM HG: ICD-10-PCS | Mod: HCNC,CPTII,S$GLB, | Performed by: INTERNAL MEDICINE

## 2021-10-29 PROCEDURE — 1159F PR MEDICATION LIST DOCUMENTED IN MEDICAL RECORD: ICD-10-PCS | Mod: HCNC,CPTII,S$GLB, | Performed by: INTERNAL MEDICINE

## 2021-10-29 PROCEDURE — 3078F PR MOST RECENT DIASTOLIC BLOOD PRESSURE < 80 MM HG: ICD-10-PCS | Mod: HCNC,CPTII,S$GLB, | Performed by: INTERNAL MEDICINE

## 2021-10-29 PROCEDURE — 1157F PR ADVANCE CARE PLAN OR EQUIV PRESENT IN MEDICAL RECORD: ICD-10-PCS | Mod: HCNC,CPTII,S$GLB, | Performed by: INTERNAL MEDICINE

## 2021-10-29 PROCEDURE — 99999 PR PBB SHADOW E&M-EST. PATIENT-LVL IV: CPT | Mod: PBBFAC,HCNC,, | Performed by: INTERNAL MEDICINE

## 2021-10-29 PROCEDURE — 1126F PR PAIN SEVERITY QUANTIFIED, NO PAIN PRESENT: ICD-10-PCS | Mod: HCNC,CPTII,S$GLB, | Performed by: INTERNAL MEDICINE

## 2021-10-29 PROCEDURE — 1160F PR REVIEW ALL MEDS BY PRESCRIBER/CLIN PHARMACIST DOCUMENTED: ICD-10-PCS | Mod: HCNC,CPTII,S$GLB, | Performed by: INTERNAL MEDICINE

## 2021-11-10 ENCOUNTER — OFFICE VISIT (OUTPATIENT)
Dept: HEMATOLOGY/ONCOLOGY | Facility: CLINIC | Age: 76
End: 2021-11-10
Payer: MEDICARE

## 2021-11-10 ENCOUNTER — LAB VISIT (OUTPATIENT)
Dept: LAB | Facility: HOSPITAL | Age: 76
End: 2021-11-10
Attending: INTERNAL MEDICINE
Payer: MEDICARE

## 2021-11-10 ENCOUNTER — INFUSION (OUTPATIENT)
Dept: INFUSION THERAPY | Facility: HOSPITAL | Age: 76
End: 2021-11-10
Attending: INTERNAL MEDICINE
Payer: MEDICARE

## 2021-11-10 VITALS
TEMPERATURE: 98 F | HEART RATE: 56 BPM | DIASTOLIC BLOOD PRESSURE: 75 MMHG | RESPIRATION RATE: 16 BRPM | SYSTOLIC BLOOD PRESSURE: 147 MMHG | OXYGEN SATURATION: 97 %

## 2021-11-10 VITALS
SYSTOLIC BLOOD PRESSURE: 150 MMHG | OXYGEN SATURATION: 96 % | HEART RATE: 60 BPM | DIASTOLIC BLOOD PRESSURE: 72 MMHG | HEIGHT: 63 IN | WEIGHT: 167.75 LBS | BODY MASS INDEX: 29.72 KG/M2 | TEMPERATURE: 98 F

## 2021-11-10 DIAGNOSIS — R53.83 OTHER FATIGUE: ICD-10-CM

## 2021-11-10 DIAGNOSIS — J44.9 CHRONIC OBSTRUCTIVE PULMONARY DISEASE, UNSPECIFIED COPD TYPE: ICD-10-CM

## 2021-11-10 DIAGNOSIS — T45.1X5A IMMUNODEFICIENCY DUE TO CHEMOTHERAPY: ICD-10-CM

## 2021-11-10 DIAGNOSIS — C50.919 RECURRENT BREAST CANCER, UNSPECIFIED LATERALITY: Primary | ICD-10-CM

## 2021-11-10 DIAGNOSIS — Z79.899 IMMUNODEFICIENCY DUE TO CHEMOTHERAPY: ICD-10-CM

## 2021-11-10 DIAGNOSIS — Z85.118 HISTORY OF LUNG CANCER: ICD-10-CM

## 2021-11-10 DIAGNOSIS — C50.919 RECURRENT BREAST CANCER, UNSPECIFIED LATERALITY: ICD-10-CM

## 2021-11-10 DIAGNOSIS — D84.821 IMMUNODEFICIENCY DUE TO CHEMOTHERAPY: ICD-10-CM

## 2021-11-10 DIAGNOSIS — C50.919 METASTATIC BREAST CANCER: Primary | ICD-10-CM

## 2021-11-10 LAB
ALBUMIN SERPL BCP-MCNC: 4 G/DL (ref 3.5–5.2)
ALP SERPL-CCNC: 55 U/L (ref 55–135)
ALT SERPL W/O P-5'-P-CCNC: 14 U/L (ref 10–44)
ANION GAP SERPL CALC-SCNC: 8 MMOL/L (ref 8–16)
AST SERPL-CCNC: 17 U/L (ref 10–40)
BASOPHILS # BLD AUTO: 0.03 K/UL (ref 0–0.2)
BASOPHILS NFR BLD: 0.6 % (ref 0–1.9)
BILIRUB SERPL-MCNC: 0.6 MG/DL (ref 0.1–1)
BUN SERPL-MCNC: 17 MG/DL (ref 8–23)
CALCIUM SERPL-MCNC: 9.5 MG/DL (ref 8.7–10.5)
CHLORIDE SERPL-SCNC: 103 MMOL/L (ref 95–110)
CO2 SERPL-SCNC: 29 MMOL/L (ref 23–29)
CREAT SERPL-MCNC: 0.8 MG/DL (ref 0.5–1.4)
DIFFERENTIAL METHOD: ABNORMAL
EOSINOPHIL # BLD AUTO: 0.1 K/UL (ref 0–0.5)
EOSINOPHIL NFR BLD: 1.6 % (ref 0–8)
ERYTHROCYTE [DISTWIDTH] IN BLOOD BY AUTOMATED COUNT: 14.3 % (ref 11.5–14.5)
EST. GFR  (AFRICAN AMERICAN): >60 ML/MIN/1.73 M^2
EST. GFR  (NON AFRICAN AMERICAN): >60 ML/MIN/1.73 M^2
GLUCOSE SERPL-MCNC: 130 MG/DL (ref 70–110)
HCT VFR BLD AUTO: 44.8 % (ref 37–48.5)
HGB BLD-MCNC: 14.2 G/DL (ref 12–16)
IMM GRANULOCYTES # BLD AUTO: 0.02 K/UL (ref 0–0.04)
IMM GRANULOCYTES NFR BLD AUTO: 0.4 % (ref 0–0.5)
LYMPHOCYTES # BLD AUTO: 1.4 K/UL (ref 1–4.8)
LYMPHOCYTES NFR BLD: 28.5 % (ref 18–48)
MCH RBC QN AUTO: 30.6 PG (ref 27–31)
MCHC RBC AUTO-ENTMCNC: 31.7 G/DL (ref 32–36)
MCV RBC AUTO: 97 FL (ref 82–98)
MONOCYTES # BLD AUTO: 0.5 K/UL (ref 0.3–1)
MONOCYTES NFR BLD: 10.3 % (ref 4–15)
NEUTROPHILS # BLD AUTO: 3 K/UL (ref 1.8–7.7)
NEUTROPHILS NFR BLD: 58.6 % (ref 38–73)
NRBC BLD-RTO: 0 /100 WBC
PLATELET # BLD AUTO: 209 K/UL (ref 150–450)
PMV BLD AUTO: 9.4 FL (ref 9.2–12.9)
POTASSIUM SERPL-SCNC: 4.3 MMOL/L (ref 3.5–5.1)
PROT SERPL-MCNC: 6.5 G/DL (ref 6–8.4)
RBC # BLD AUTO: 4.64 M/UL (ref 4–5.4)
SODIUM SERPL-SCNC: 140 MMOL/L (ref 136–145)
TSH SERPL DL<=0.005 MIU/L-ACNC: 2.64 UIU/ML (ref 0.4–4)
WBC # BLD AUTO: 5.05 K/UL (ref 3.9–12.7)

## 2021-11-10 PROCEDURE — 36415 COLL VENOUS BLD VENIPUNCTURE: CPT | Mod: HCNC | Performed by: INTERNAL MEDICINE

## 2021-11-10 PROCEDURE — 99499 UNLISTED E&M SERVICE: CPT | Mod: HCNC,S$GLB,, | Performed by: INTERNAL MEDICINE

## 2021-11-10 PROCEDURE — 99214 PR OFFICE/OUTPT VISIT, EST, LEVL IV, 30-39 MIN: ICD-10-PCS | Mod: HCNC,S$GLB,, | Performed by: INTERNAL MEDICINE

## 2021-11-10 PROCEDURE — 3078F DIAST BP <80 MM HG: CPT | Mod: HCNC,CPTII,S$GLB, | Performed by: INTERNAL MEDICINE

## 2021-11-10 PROCEDURE — 99499 RISK ADDL DX/OHS AUDIT: ICD-10-PCS | Mod: HCNC,S$GLB,, | Performed by: INTERNAL MEDICINE

## 2021-11-10 PROCEDURE — 80053 COMPREHEN METABOLIC PANEL: CPT | Mod: HCNC | Performed by: INTERNAL MEDICINE

## 2021-11-10 PROCEDURE — 99999 PR PBB SHADOW E&M-EST. PATIENT-LVL III: ICD-10-PCS | Mod: PBBFAC,HCNC,, | Performed by: INTERNAL MEDICINE

## 2021-11-10 PROCEDURE — 1101F PT FALLS ASSESS-DOCD LE1/YR: CPT | Mod: HCNC,CPTII,S$GLB, | Performed by: INTERNAL MEDICINE

## 2021-11-10 PROCEDURE — 63600175 PHARM REV CODE 636 W HCPCS: Mod: JG,HCNC | Performed by: INTERNAL MEDICINE

## 2021-11-10 PROCEDURE — 3288F FALL RISK ASSESSMENT DOCD: CPT | Mod: HCNC,CPTII,S$GLB, | Performed by: INTERNAL MEDICINE

## 2021-11-10 PROCEDURE — 1126F PR PAIN SEVERITY QUANTIFIED, NO PAIN PRESENT: ICD-10-PCS | Mod: HCNC,CPTII,S$GLB, | Performed by: INTERNAL MEDICINE

## 2021-11-10 PROCEDURE — 25000003 PHARM REV CODE 250: Mod: HCNC | Performed by: INTERNAL MEDICINE

## 2021-11-10 PROCEDURE — 3044F HG A1C LEVEL LT 7.0%: CPT | Mod: HCNC,CPTII,S$GLB, | Performed by: INTERNAL MEDICINE

## 2021-11-10 PROCEDURE — 1157F ADVNC CARE PLAN IN RCRD: CPT | Mod: HCNC,CPTII,S$GLB, | Performed by: INTERNAL MEDICINE

## 2021-11-10 PROCEDURE — 99999 PR PBB SHADOW E&M-EST. PATIENT-LVL III: CPT | Mod: PBBFAC,HCNC,, | Performed by: INTERNAL MEDICINE

## 2021-11-10 PROCEDURE — 3078F PR MOST RECENT DIASTOLIC BLOOD PRESSURE < 80 MM HG: ICD-10-PCS | Mod: HCNC,CPTII,S$GLB, | Performed by: INTERNAL MEDICINE

## 2021-11-10 PROCEDURE — 3077F PR MOST RECENT SYSTOLIC BLOOD PRESSURE >= 140 MM HG: ICD-10-PCS | Mod: HCNC,CPTII,S$GLB, | Performed by: INTERNAL MEDICINE

## 2021-11-10 PROCEDURE — 1126F AMNT PAIN NOTED NONE PRSNT: CPT | Mod: HCNC,CPTII,S$GLB, | Performed by: INTERNAL MEDICINE

## 2021-11-10 PROCEDURE — 3077F SYST BP >= 140 MM HG: CPT | Mod: HCNC,CPTII,S$GLB, | Performed by: INTERNAL MEDICINE

## 2021-11-10 PROCEDURE — 3288F PR FALLS RISK ASSESSMENT DOCUMENTED: ICD-10-PCS | Mod: HCNC,CPTII,S$GLB, | Performed by: INTERNAL MEDICINE

## 2021-11-10 PROCEDURE — 84443 ASSAY THYROID STIM HORMONE: CPT | Mod: HCNC | Performed by: INTERNAL MEDICINE

## 2021-11-10 PROCEDURE — A4216 STERILE WATER/SALINE, 10 ML: HCPCS | Mod: HCNC | Performed by: INTERNAL MEDICINE

## 2021-11-10 PROCEDURE — 99214 OFFICE O/P EST MOD 30 MIN: CPT | Mod: HCNC,S$GLB,, | Performed by: INTERNAL MEDICINE

## 2021-11-10 PROCEDURE — 85025 COMPLETE CBC W/AUTO DIFF WBC: CPT | Mod: HCNC | Performed by: INTERNAL MEDICINE

## 2021-11-10 PROCEDURE — 96413 CHEMO IV INFUSION 1 HR: CPT | Mod: HCNC

## 2021-11-10 PROCEDURE — 3044F PR MOST RECENT HEMOGLOBIN A1C LEVEL <7.0%: ICD-10-PCS | Mod: HCNC,CPTII,S$GLB, | Performed by: INTERNAL MEDICINE

## 2021-11-10 PROCEDURE — 1101F PR PT FALLS ASSESS DOC 0-1 FALLS W/OUT INJ PAST YR: ICD-10-PCS | Mod: HCNC,CPTII,S$GLB, | Performed by: INTERNAL MEDICINE

## 2021-11-10 PROCEDURE — 1157F PR ADVANCE CARE PLAN OR EQUIV PRESENT IN MEDICAL RECORD: ICD-10-PCS | Mod: HCNC,CPTII,S$GLB, | Performed by: INTERNAL MEDICINE

## 2021-11-10 RX ORDER — SODIUM CHLORIDE 0.9 % (FLUSH) 0.9 %
10 SYRINGE (ML) INJECTION
Status: DISCONTINUED | OUTPATIENT
Start: 2021-11-10 | End: 2021-11-10 | Stop reason: HOSPADM

## 2021-11-10 RX ORDER — HEPARIN 100 UNIT/ML
500 SYRINGE INTRAVENOUS
Status: CANCELLED | OUTPATIENT
Start: 2021-11-10

## 2021-11-10 RX ORDER — SODIUM CHLORIDE 0.9 % (FLUSH) 0.9 %
10 SYRINGE (ML) INJECTION
Status: CANCELLED | OUTPATIENT
Start: 2021-11-10

## 2021-11-10 RX ORDER — HEPARIN 100 UNIT/ML
500 SYRINGE INTRAVENOUS
Status: DISCONTINUED | OUTPATIENT
Start: 2021-11-10 | End: 2021-11-10 | Stop reason: HOSPADM

## 2021-11-10 RX ADMIN — Medication 10 ML: at 09:11

## 2021-11-10 RX ADMIN — HEPARIN 500 UNITS: 100 SYRINGE at 09:11

## 2021-11-10 RX ADMIN — SODIUM CHLORIDE 200 MG: 9 INJECTION, SOLUTION INTRAVENOUS at 08:11

## 2021-12-01 ENCOUNTER — LAB VISIT (OUTPATIENT)
Dept: LAB | Facility: HOSPITAL | Age: 76
End: 2021-12-01
Attending: INTERNAL MEDICINE
Payer: MEDICARE

## 2021-12-01 ENCOUNTER — INFUSION (OUTPATIENT)
Dept: INFUSION THERAPY | Facility: HOSPITAL | Age: 76
End: 2021-12-01
Attending: INTERNAL MEDICINE
Payer: MEDICARE

## 2021-12-01 ENCOUNTER — OFFICE VISIT (OUTPATIENT)
Dept: HEMATOLOGY/ONCOLOGY | Facility: CLINIC | Age: 76
End: 2021-12-01
Payer: MEDICARE

## 2021-12-01 VITALS
TEMPERATURE: 98 F | OXYGEN SATURATION: 98 % | BODY MASS INDEX: 30 KG/M2 | DIASTOLIC BLOOD PRESSURE: 62 MMHG | WEIGHT: 169.31 LBS | HEART RATE: 55 BPM | HEIGHT: 63 IN | SYSTOLIC BLOOD PRESSURE: 136 MMHG

## 2021-12-01 VITALS
DIASTOLIC BLOOD PRESSURE: 80 MMHG | SYSTOLIC BLOOD PRESSURE: 142 MMHG | RESPIRATION RATE: 16 BRPM | OXYGEN SATURATION: 97 % | HEART RATE: 60 BPM

## 2021-12-01 DIAGNOSIS — J44.9 CHRONIC OBSTRUCTIVE PULMONARY DISEASE, UNSPECIFIED COPD TYPE: ICD-10-CM

## 2021-12-01 DIAGNOSIS — R53.83 OTHER FATIGUE: ICD-10-CM

## 2021-12-01 DIAGNOSIS — D84.821 IMMUNODEFICIENCY DUE TO CHEMOTHERAPY: ICD-10-CM

## 2021-12-01 DIAGNOSIS — Z79.899 IMMUNODEFICIENCY DUE TO CHEMOTHERAPY: ICD-10-CM

## 2021-12-01 DIAGNOSIS — C50.919 METASTATIC BREAST CANCER: Primary | ICD-10-CM

## 2021-12-01 DIAGNOSIS — Z85.118 HISTORY OF LUNG CANCER: ICD-10-CM

## 2021-12-01 DIAGNOSIS — C50.919 RECURRENT BREAST CANCER, UNSPECIFIED LATERALITY: ICD-10-CM

## 2021-12-01 DIAGNOSIS — C50.919 RECURRENT BREAST CANCER, UNSPECIFIED LATERALITY: Primary | ICD-10-CM

## 2021-12-01 DIAGNOSIS — T45.1X5A IMMUNODEFICIENCY DUE TO CHEMOTHERAPY: ICD-10-CM

## 2021-12-01 LAB
ALBUMIN SERPL BCP-MCNC: 3.9 G/DL (ref 3.5–5.2)
ALP SERPL-CCNC: 57 U/L (ref 55–135)
ALT SERPL W/O P-5'-P-CCNC: 17 U/L (ref 10–44)
ANION GAP SERPL CALC-SCNC: 12 MMOL/L (ref 8–16)
AST SERPL-CCNC: 15 U/L (ref 10–40)
BASOPHILS # BLD AUTO: 0.06 K/UL (ref 0–0.2)
BASOPHILS NFR BLD: 1 % (ref 0–1.9)
BILIRUB SERPL-MCNC: 0.5 MG/DL (ref 0.1–1)
BUN SERPL-MCNC: 11 MG/DL (ref 8–23)
CALCIUM SERPL-MCNC: 9.3 MG/DL (ref 8.7–10.5)
CHLORIDE SERPL-SCNC: 100 MMOL/L (ref 95–110)
CO2 SERPL-SCNC: 29 MMOL/L (ref 23–29)
CREAT SERPL-MCNC: 0.8 MG/DL (ref 0.5–1.4)
DIFFERENTIAL METHOD: NORMAL
EOSINOPHIL # BLD AUTO: 0.1 K/UL (ref 0–0.5)
EOSINOPHIL NFR BLD: 1.8 % (ref 0–8)
ERYTHROCYTE [DISTWIDTH] IN BLOOD BY AUTOMATED COUNT: 13.9 % (ref 11.5–14.5)
EST. GFR  (AFRICAN AMERICAN): >60 ML/MIN/1.73 M^2
EST. GFR  (NON AFRICAN AMERICAN): >60 ML/MIN/1.73 M^2
GLUCOSE SERPL-MCNC: 136 MG/DL (ref 70–110)
HCT VFR BLD AUTO: 43.1 % (ref 37–48.5)
HGB BLD-MCNC: 13.9 G/DL (ref 12–16)
IMM GRANULOCYTES # BLD AUTO: 0.02 K/UL (ref 0–0.04)
IMM GRANULOCYTES NFR BLD AUTO: 0.3 % (ref 0–0.5)
LYMPHOCYTES # BLD AUTO: 1.2 K/UL (ref 1–4.8)
LYMPHOCYTES NFR BLD: 19.6 % (ref 18–48)
MCH RBC QN AUTO: 30.6 PG (ref 27–31)
MCHC RBC AUTO-ENTMCNC: 32.3 G/DL (ref 32–36)
MCV RBC AUTO: 95 FL (ref 82–98)
MONOCYTES # BLD AUTO: 0.5 K/UL (ref 0.3–1)
MONOCYTES NFR BLD: 8.5 % (ref 4–15)
NEUTROPHILS # BLD AUTO: 4.3 K/UL (ref 1.8–7.7)
NEUTROPHILS NFR BLD: 68.8 % (ref 38–73)
NRBC BLD-RTO: 0 /100 WBC
PLATELET # BLD AUTO: 199 K/UL (ref 150–450)
PMV BLD AUTO: 10.2 FL (ref 9.2–12.9)
POTASSIUM SERPL-SCNC: 3.7 MMOL/L (ref 3.5–5.1)
PROT SERPL-MCNC: 6.8 G/DL (ref 6–8.4)
RBC # BLD AUTO: 4.54 M/UL (ref 4–5.4)
SODIUM SERPL-SCNC: 141 MMOL/L (ref 136–145)
TSH SERPL DL<=0.005 MIU/L-ACNC: 3.13 UIU/ML (ref 0.4–4)
WBC # BLD AUTO: 6.21 K/UL (ref 3.9–12.7)

## 2021-12-01 PROCEDURE — 25000003 PHARM REV CODE 250: Mod: HCNC | Performed by: INTERNAL MEDICINE

## 2021-12-01 PROCEDURE — 96413 CHEMO IV INFUSION 1 HR: CPT | Mod: HCNC

## 2021-12-01 PROCEDURE — 1157F ADVNC CARE PLAN IN RCRD: CPT | Mod: HCNC,CPTII,S$GLB, | Performed by: INTERNAL MEDICINE

## 2021-12-01 PROCEDURE — 84443 ASSAY THYROID STIM HORMONE: CPT | Mod: HCNC | Performed by: INTERNAL MEDICINE

## 2021-12-01 PROCEDURE — 99214 OFFICE O/P EST MOD 30 MIN: CPT | Mod: HCNC,S$GLB,, | Performed by: INTERNAL MEDICINE

## 2021-12-01 PROCEDURE — 63600175 PHARM REV CODE 636 W HCPCS: Mod: JG,HCNC | Performed by: INTERNAL MEDICINE

## 2021-12-01 PROCEDURE — 1157F PR ADVANCE CARE PLAN OR EQUIV PRESENT IN MEDICAL RECORD: ICD-10-PCS | Mod: HCNC,CPTII,S$GLB, | Performed by: INTERNAL MEDICINE

## 2021-12-01 PROCEDURE — 80053 COMPREHEN METABOLIC PANEL: CPT | Mod: HCNC | Performed by: INTERNAL MEDICINE

## 2021-12-01 PROCEDURE — 99499 UNLISTED E&M SERVICE: CPT | Mod: HCNC,S$GLB,, | Performed by: INTERNAL MEDICINE

## 2021-12-01 PROCEDURE — 99999 PR PBB SHADOW E&M-EST. PATIENT-LVL III: ICD-10-PCS | Mod: PBBFAC,HCNC,, | Performed by: INTERNAL MEDICINE

## 2021-12-01 PROCEDURE — A4216 STERILE WATER/SALINE, 10 ML: HCPCS | Mod: HCNC | Performed by: INTERNAL MEDICINE

## 2021-12-01 PROCEDURE — 99214 PR OFFICE/OUTPT VISIT, EST, LEVL IV, 30-39 MIN: ICD-10-PCS | Mod: HCNC,S$GLB,, | Performed by: INTERNAL MEDICINE

## 2021-12-01 PROCEDURE — 99999 PR PBB SHADOW E&M-EST. PATIENT-LVL III: CPT | Mod: PBBFAC,HCNC,, | Performed by: INTERNAL MEDICINE

## 2021-12-01 PROCEDURE — 85025 COMPLETE CBC W/AUTO DIFF WBC: CPT | Mod: HCNC | Performed by: INTERNAL MEDICINE

## 2021-12-01 PROCEDURE — 99499 RISK ADDL DX/OHS AUDIT: ICD-10-PCS | Mod: HCNC,S$GLB,, | Performed by: INTERNAL MEDICINE

## 2021-12-01 PROCEDURE — 36415 COLL VENOUS BLD VENIPUNCTURE: CPT | Mod: HCNC | Performed by: INTERNAL MEDICINE

## 2021-12-01 RX ORDER — HEPARIN 100 UNIT/ML
500 SYRINGE INTRAVENOUS
Status: CANCELLED | OUTPATIENT
Start: 2021-12-01

## 2021-12-01 RX ORDER — HEPARIN 100 UNIT/ML
500 SYRINGE INTRAVENOUS
Status: DISCONTINUED | OUTPATIENT
Start: 2021-12-01 | End: 2021-12-01 | Stop reason: HOSPADM

## 2021-12-01 RX ORDER — SODIUM CHLORIDE 0.9 % (FLUSH) 0.9 %
10 SYRINGE (ML) INJECTION
Status: DISCONTINUED | OUTPATIENT
Start: 2021-12-01 | End: 2021-12-01 | Stop reason: HOSPADM

## 2021-12-01 RX ORDER — SODIUM CHLORIDE 0.9 % (FLUSH) 0.9 %
10 SYRINGE (ML) INJECTION
Status: CANCELLED | OUTPATIENT
Start: 2021-12-01

## 2021-12-01 RX ADMIN — SODIUM CHLORIDE 200 MG: 9 INJECTION, SOLUTION INTRAVENOUS at 09:12

## 2021-12-01 RX ADMIN — HEPARIN 500 UNITS: 100 SYRINGE at 09:12

## 2021-12-01 RX ADMIN — Medication 10 ML: at 09:12

## 2021-12-17 ENCOUNTER — PES CALL (OUTPATIENT)
Dept: ADMINISTRATIVE | Facility: CLINIC | Age: 76
End: 2021-12-17
Payer: MEDICARE

## 2021-12-22 ENCOUNTER — INFUSION (OUTPATIENT)
Dept: INFUSION THERAPY | Facility: HOSPITAL | Age: 76
End: 2021-12-22
Attending: INTERNAL MEDICINE
Payer: MEDICARE

## 2021-12-22 VITALS
SYSTOLIC BLOOD PRESSURE: 129 MMHG | OXYGEN SATURATION: 98 % | HEART RATE: 55 BPM | TEMPERATURE: 98 F | RESPIRATION RATE: 16 BRPM | DIASTOLIC BLOOD PRESSURE: 66 MMHG

## 2021-12-22 DIAGNOSIS — C50.919 METASTATIC BREAST CANCER: Primary | ICD-10-CM

## 2021-12-22 PROCEDURE — 96413 CHEMO IV INFUSION 1 HR: CPT | Mod: HCNC

## 2021-12-22 PROCEDURE — 25000003 PHARM REV CODE 250: Mod: HCNC | Performed by: INTERNAL MEDICINE

## 2021-12-22 PROCEDURE — 63600175 PHARM REV CODE 636 W HCPCS: Mod: JG,HCNC | Performed by: INTERNAL MEDICINE

## 2021-12-22 PROCEDURE — A4216 STERILE WATER/SALINE, 10 ML: HCPCS | Mod: HCNC | Performed by: INTERNAL MEDICINE

## 2021-12-22 RX ORDER — HEPARIN 100 UNIT/ML
500 SYRINGE INTRAVENOUS
Status: CANCELLED | OUTPATIENT
Start: 2021-12-22

## 2021-12-22 RX ORDER — SODIUM CHLORIDE 0.9 % (FLUSH) 0.9 %
10 SYRINGE (ML) INJECTION
Status: DISCONTINUED | OUTPATIENT
Start: 2021-12-22 | End: 2021-12-22 | Stop reason: HOSPADM

## 2021-12-22 RX ORDER — SODIUM CHLORIDE 0.9 % (FLUSH) 0.9 %
10 SYRINGE (ML) INJECTION
Status: CANCELLED | OUTPATIENT
Start: 2021-12-22

## 2021-12-22 RX ORDER — HEPARIN 100 UNIT/ML
500 SYRINGE INTRAVENOUS
Status: DISCONTINUED | OUTPATIENT
Start: 2021-12-22 | End: 2021-12-22 | Stop reason: HOSPADM

## 2021-12-22 RX ADMIN — Medication 10 ML: at 09:12

## 2021-12-22 RX ADMIN — HEPARIN 500 UNITS: 100 SYRINGE at 09:12

## 2021-12-22 RX ADMIN — SODIUM CHLORIDE 200 MG: 9 INJECTION, SOLUTION INTRAVENOUS at 09:12

## 2022-01-05 ENCOUNTER — TELEPHONE (OUTPATIENT)
Dept: FAMILY MEDICINE | Facility: CLINIC | Age: 77
End: 2022-01-05
Payer: MEDICARE

## 2022-01-05 NOTE — TELEPHONE ENCOUNTER
----- Message from Gisele Oleary sent at 1/4/2022  9:44 AM CST -----  Regarding: self [phone]  .Type:  Needs Medical Advice    Who Called: self   Symptoms (please be specific): r arm pain went to [facility] [place] last week - patient declined appt tomorrow with dr shore   How long has patient had these symptoms:  3 weeks   Pharmacy name and phone #:  ..  [facility] - [place], [place] - [address] [address] [address] [address]  [place] [address]  Phone: [phone] Fax: [phone]     Would the patient rather a call back or a response via [facility]? Call   Best Call Back Number:  [phone]

## 2022-01-05 NOTE — TELEPHONE ENCOUNTER
Return call to Pt, and she states that she no longer needs the appointment that she feels better.

## 2022-01-12 ENCOUNTER — LAB VISIT (OUTPATIENT)
Dept: LAB | Facility: HOSPITAL | Age: 77
End: 2022-01-12
Attending: INTERNAL MEDICINE
Payer: MEDICARE

## 2022-01-12 ENCOUNTER — INFUSION (OUTPATIENT)
Dept: INFUSION THERAPY | Facility: HOSPITAL | Age: 77
End: 2022-01-12
Attending: INTERNAL MEDICINE
Payer: MEDICARE

## 2022-01-12 ENCOUNTER — OFFICE VISIT (OUTPATIENT)
Dept: HEMATOLOGY/ONCOLOGY | Facility: CLINIC | Age: 77
End: 2022-01-12
Payer: MEDICARE

## 2022-01-12 VITALS
HEART RATE: 60 BPM | DIASTOLIC BLOOD PRESSURE: 84 MMHG | TEMPERATURE: 98 F | BODY MASS INDEX: 30.12 KG/M2 | WEIGHT: 170 LBS | HEIGHT: 63 IN | OXYGEN SATURATION: 98 % | SYSTOLIC BLOOD PRESSURE: 155 MMHG

## 2022-01-12 VITALS
OXYGEN SATURATION: 97 % | DIASTOLIC BLOOD PRESSURE: 104 MMHG | SYSTOLIC BLOOD PRESSURE: 185 MMHG | RESPIRATION RATE: 16 BRPM | HEART RATE: 64 BPM

## 2022-01-12 DIAGNOSIS — C50.111 MALIGNANT NEOPLASM OF CENTRAL PORTION OF RIGHT BREAST IN FEMALE, ESTROGEN RECEPTOR NEGATIVE: ICD-10-CM

## 2022-01-12 DIAGNOSIS — R53.83 OTHER FATIGUE: ICD-10-CM

## 2022-01-12 DIAGNOSIS — Z85.118 HISTORY OF LUNG CANCER: ICD-10-CM

## 2022-01-12 DIAGNOSIS — J44.9 CHRONIC OBSTRUCTIVE PULMONARY DISEASE, UNSPECIFIED COPD TYPE: ICD-10-CM

## 2022-01-12 DIAGNOSIS — T45.1X5A IMMUNODEFICIENCY DUE TO CHEMOTHERAPY: ICD-10-CM

## 2022-01-12 DIAGNOSIS — R59.0 AXILLARY LYMPHADENOPATHY: ICD-10-CM

## 2022-01-12 DIAGNOSIS — C50.919 RECURRENT BREAST CANCER, UNSPECIFIED LATERALITY: Primary | ICD-10-CM

## 2022-01-12 DIAGNOSIS — D84.821 IMMUNODEFICIENCY DUE TO CHEMOTHERAPY: ICD-10-CM

## 2022-01-12 DIAGNOSIS — C50.919 METASTATIC BREAST CANCER: Primary | ICD-10-CM

## 2022-01-12 DIAGNOSIS — M25.511 ACUTE PAIN OF RIGHT SHOULDER: ICD-10-CM

## 2022-01-12 DIAGNOSIS — Z79.899 IMMUNODEFICIENCY DUE TO CHEMOTHERAPY: ICD-10-CM

## 2022-01-12 DIAGNOSIS — C50.919 RECURRENT BREAST CANCER, UNSPECIFIED LATERALITY: ICD-10-CM

## 2022-01-12 DIAGNOSIS — Z17.1 MALIGNANT NEOPLASM OF CENTRAL PORTION OF RIGHT BREAST IN FEMALE, ESTROGEN RECEPTOR NEGATIVE: ICD-10-CM

## 2022-01-12 LAB
ALBUMIN SERPL BCP-MCNC: 4.1 G/DL (ref 3.5–5.2)
ALP SERPL-CCNC: 62 U/L (ref 55–135)
ALT SERPL W/O P-5'-P-CCNC: 25 U/L (ref 10–44)
ANION GAP SERPL CALC-SCNC: 8 MMOL/L (ref 8–16)
AST SERPL-CCNC: 20 U/L (ref 10–40)
BASOPHILS # BLD AUTO: 0.07 K/UL (ref 0–0.2)
BASOPHILS NFR BLD: 1.3 % (ref 0–1.9)
BILIRUB SERPL-MCNC: 0.5 MG/DL (ref 0.1–1)
BUN SERPL-MCNC: 16 MG/DL (ref 8–23)
CALCIUM SERPL-MCNC: 9.7 MG/DL (ref 8.7–10.5)
CHLORIDE SERPL-SCNC: 101 MMOL/L (ref 95–110)
CO2 SERPL-SCNC: 31 MMOL/L (ref 23–29)
CREAT SERPL-MCNC: 0.8 MG/DL (ref 0.5–1.4)
DIFFERENTIAL METHOD: ABNORMAL
EOSINOPHIL # BLD AUTO: 0.2 K/UL (ref 0–0.5)
EOSINOPHIL NFR BLD: 2.9 % (ref 0–8)
ERYTHROCYTE [DISTWIDTH] IN BLOOD BY AUTOMATED COUNT: 13.9 % (ref 11.5–14.5)
EST. GFR  (AFRICAN AMERICAN): >60 ML/MIN/1.73 M^2
EST. GFR  (NON AFRICAN AMERICAN): >60 ML/MIN/1.73 M^2
GLUCOSE SERPL-MCNC: 125 MG/DL (ref 70–110)
HCT VFR BLD AUTO: 45.2 % (ref 37–48.5)
HGB BLD-MCNC: 14.1 G/DL (ref 12–16)
IMM GRANULOCYTES # BLD AUTO: 0.04 K/UL (ref 0–0.04)
IMM GRANULOCYTES NFR BLD AUTO: 0.7 % (ref 0–0.5)
LYMPHOCYTES # BLD AUTO: 1.4 K/UL (ref 1–4.8)
LYMPHOCYTES NFR BLD: 25.4 % (ref 18–48)
MCH RBC QN AUTO: 29.9 PG (ref 27–31)
MCHC RBC AUTO-ENTMCNC: 31.2 G/DL (ref 32–36)
MCV RBC AUTO: 96 FL (ref 82–98)
MONOCYTES # BLD AUTO: 0.5 K/UL (ref 0.3–1)
MONOCYTES NFR BLD: 9.8 % (ref 4–15)
NEUTROPHILS # BLD AUTO: 3.3 K/UL (ref 1.8–7.7)
NEUTROPHILS NFR BLD: 59.9 % (ref 38–73)
NRBC BLD-RTO: 0 /100 WBC
PLATELET # BLD AUTO: 218 K/UL (ref 150–450)
PMV BLD AUTO: 9.2 FL (ref 9.2–12.9)
POTASSIUM SERPL-SCNC: 4.5 MMOL/L (ref 3.5–5.1)
PROT SERPL-MCNC: 7.3 G/DL (ref 6–8.4)
RBC # BLD AUTO: 4.72 M/UL (ref 4–5.4)
SODIUM SERPL-SCNC: 140 MMOL/L (ref 136–145)
TSH SERPL DL<=0.005 MIU/L-ACNC: 3.34 UIU/ML (ref 0.4–4)
WBC # BLD AUTO: 5.43 K/UL (ref 3.9–12.7)

## 2022-01-12 PROCEDURE — 25000003 PHARM REV CODE 250: Mod: HCNC | Performed by: INTERNAL MEDICINE

## 2022-01-12 PROCEDURE — 1125F PR PAIN SEVERITY QUANTIFIED, PAIN PRESENT: ICD-10-PCS | Mod: HCNC,CPTII,S$GLB, | Performed by: INTERNAL MEDICINE

## 2022-01-12 PROCEDURE — 3288F FALL RISK ASSESSMENT DOCD: CPT | Mod: HCNC,CPTII,S$GLB, | Performed by: INTERNAL MEDICINE

## 2022-01-12 PROCEDURE — 63600175 PHARM REV CODE 636 W HCPCS: Mod: HCNC | Performed by: INTERNAL MEDICINE

## 2022-01-12 PROCEDURE — 99499 UNLISTED E&M SERVICE: CPT | Mod: S$GLB,,, | Performed by: INTERNAL MEDICINE

## 2022-01-12 PROCEDURE — A4216 STERILE WATER/SALINE, 10 ML: HCPCS | Mod: HCNC | Performed by: INTERNAL MEDICINE

## 2022-01-12 PROCEDURE — 1159F MED LIST DOCD IN RCRD: CPT | Mod: HCNC,CPTII,S$GLB, | Performed by: INTERNAL MEDICINE

## 2022-01-12 PROCEDURE — 96413 CHEMO IV INFUSION 1 HR: CPT | Mod: HCNC

## 2022-01-12 PROCEDURE — 1157F ADVNC CARE PLAN IN RCRD: CPT | Mod: HCNC,CPTII,S$GLB, | Performed by: INTERNAL MEDICINE

## 2022-01-12 PROCEDURE — 3077F PR MOST RECENT SYSTOLIC BLOOD PRESSURE >= 140 MM HG: ICD-10-PCS | Mod: HCNC,CPTII,S$GLB, | Performed by: INTERNAL MEDICINE

## 2022-01-12 PROCEDURE — 99999 PR PBB SHADOW E&M-EST. PATIENT-LVL IV: ICD-10-PCS | Mod: PBBFAC,HCNC,, | Performed by: INTERNAL MEDICINE

## 2022-01-12 PROCEDURE — 1159F PR MEDICATION LIST DOCUMENTED IN MEDICAL RECORD: ICD-10-PCS | Mod: HCNC,CPTII,S$GLB, | Performed by: INTERNAL MEDICINE

## 2022-01-12 PROCEDURE — 3079F DIAST BP 80-89 MM HG: CPT | Mod: HCNC,CPTII,S$GLB, | Performed by: INTERNAL MEDICINE

## 2022-01-12 PROCEDURE — 36415 COLL VENOUS BLD VENIPUNCTURE: CPT | Mod: HCNC | Performed by: INTERNAL MEDICINE

## 2022-01-12 PROCEDURE — 3079F PR MOST RECENT DIASTOLIC BLOOD PRESSURE 80-89 MM HG: ICD-10-PCS | Mod: HCNC,CPTII,S$GLB, | Performed by: INTERNAL MEDICINE

## 2022-01-12 PROCEDURE — 1101F PR PT FALLS ASSESS DOC 0-1 FALLS W/OUT INJ PAST YR: ICD-10-PCS | Mod: HCNC,CPTII,S$GLB, | Performed by: INTERNAL MEDICINE

## 2022-01-12 PROCEDURE — 85025 COMPLETE CBC W/AUTO DIFF WBC: CPT | Mod: HCNC | Performed by: INTERNAL MEDICINE

## 2022-01-12 PROCEDURE — 3288F PR FALLS RISK ASSESSMENT DOCUMENTED: ICD-10-PCS | Mod: HCNC,CPTII,S$GLB, | Performed by: INTERNAL MEDICINE

## 2022-01-12 PROCEDURE — 1101F PT FALLS ASSESS-DOCD LE1/YR: CPT | Mod: HCNC,CPTII,S$GLB, | Performed by: INTERNAL MEDICINE

## 2022-01-12 PROCEDURE — 80053 COMPREHEN METABOLIC PANEL: CPT | Mod: HCNC | Performed by: INTERNAL MEDICINE

## 2022-01-12 PROCEDURE — 99215 OFFICE O/P EST HI 40 MIN: CPT | Mod: HCNC,S$GLB,, | Performed by: INTERNAL MEDICINE

## 2022-01-12 PROCEDURE — 99499 RISK ADDL DX/OHS AUDIT: ICD-10-PCS | Mod: S$GLB,,, | Performed by: INTERNAL MEDICINE

## 2022-01-12 PROCEDURE — 84443 ASSAY THYROID STIM HORMONE: CPT | Mod: HCNC | Performed by: INTERNAL MEDICINE

## 2022-01-12 PROCEDURE — 3077F SYST BP >= 140 MM HG: CPT | Mod: HCNC,CPTII,S$GLB, | Performed by: INTERNAL MEDICINE

## 2022-01-12 PROCEDURE — 99215 PR OFFICE/OUTPT VISIT, EST, LEVL V, 40-54 MIN: ICD-10-PCS | Mod: HCNC,S$GLB,, | Performed by: INTERNAL MEDICINE

## 2022-01-12 PROCEDURE — 1157F PR ADVANCE CARE PLAN OR EQUIV PRESENT IN MEDICAL RECORD: ICD-10-PCS | Mod: HCNC,CPTII,S$GLB, | Performed by: INTERNAL MEDICINE

## 2022-01-12 PROCEDURE — 1125F AMNT PAIN NOTED PAIN PRSNT: CPT | Mod: HCNC,CPTII,S$GLB, | Performed by: INTERNAL MEDICINE

## 2022-01-12 PROCEDURE — 99999 PR PBB SHADOW E&M-EST. PATIENT-LVL IV: CPT | Mod: PBBFAC,HCNC,, | Performed by: INTERNAL MEDICINE

## 2022-01-12 RX ORDER — SODIUM CHLORIDE 0.9 % (FLUSH) 0.9 %
10 SYRINGE (ML) INJECTION
Status: DISCONTINUED | OUTPATIENT
Start: 2022-01-12 | End: 2022-01-12 | Stop reason: HOSPADM

## 2022-01-12 RX ORDER — HEPARIN 100 UNIT/ML
500 SYRINGE INTRAVENOUS
Status: DISCONTINUED | OUTPATIENT
Start: 2022-01-12 | End: 2022-01-12 | Stop reason: HOSPADM

## 2022-01-12 RX ORDER — TRAMADOL HYDROCHLORIDE 50 MG/1
50 TABLET ORAL EVERY 8 HOURS PRN
Qty: 30 TABLET | Refills: 0 | Status: SHIPPED | OUTPATIENT
Start: 2022-01-12 | End: 2022-02-11

## 2022-01-12 RX ORDER — HEPARIN 100 UNIT/ML
500 SYRINGE INTRAVENOUS
Status: CANCELLED | OUTPATIENT
Start: 2022-01-12

## 2022-01-12 RX ORDER — SODIUM CHLORIDE 0.9 % (FLUSH) 0.9 %
10 SYRINGE (ML) INJECTION
Status: CANCELLED | OUTPATIENT
Start: 2022-01-12

## 2022-01-12 RX ADMIN — HEPARIN 500 UNITS: 100 SYRINGE at 09:01

## 2022-01-12 RX ADMIN — SODIUM CHLORIDE 200 MG: 9 INJECTION, SOLUTION INTRAVENOUS at 09:01

## 2022-01-12 RX ADMIN — Medication 10 ML: at 09:01

## 2022-01-12 NOTE — PROGRESS NOTES
Subjective:       Patient ID: Ade Castellano is a 76 y.o. female.    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 The PET(+) disease in the right axilla and right supraclavicular fossa will be boosted at 200 cGy per fraction to 6000 cGy total dose.  S/p LYMPH NODE, RIGHT AXILLA, BIOPSY: 6/16/21 . Metastatic poorly-differentiated carcinoma, c/w breast primary ERnegPRneg Her2 neg  PD-L1 (22C3) IHC CPS> is greater than or equal to 10  Pembrolizumab 7/22/21 -present        Prior Hx:  75 y/o female with history of  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  And Rt  breast Haja/p  cycle 6 of carboplatin/Abraxane 7/2/2018   She has  History of Stage 1A  Rt breast cancer Grade 3, ER/UT neg , Her 2 jose maria neg s/p lumpectomy 7/11/2014 and s/p adjuvant RT 9/2014 Pt declined Adjuvant chemo.Pathology showed a 1.15 cm, grade 3 infiltrating ductal carcinoma.  One sentinel lymph node was negative for malignancy.  Margins were negative and the closest margin was 1 cm.  She was staged  pT1c  pN0 cM0 stage IA.  She declined adjuvant chemo therapy.  She completed post operative radiation therapy at 400 cGy per fraction to 4000 cGy 9/2014  Pt hospitalized 11/2017 for  acute on chronic respiratory failure requiring intubation and ventilation. Has large lung mass in right lung with probable post obstructive pneumonia resulting in COPD exacerbation.CTA  chest 11/29/2017 revealed   Large right hilar mass with involvement of adjacent segmental pulmonary arterial branches and bronchi with associated postobstructive atelectasis and volume loss in the RML  with adjacent ground glass opacity concerning for underlying neoplastic process. Mediastinal and axillary adenopathy, with a right axillary lymph node measuring up to 2.0 cm in short axis diameter.She underwent rt axillary LN bx at outside facility- on 1/16/2018 at [facility]. Pathology revealed metastatic poorly differentiated carcinoma of unknown primary site. She next underwent lung bx at [facility] 1/31/2018  benign lung tissue. Repeat Right Lung Bx 2/14/2018 Pathology reveals Squamous cell carcinoma PD-L1 10% low expression EGFR NEG ALK NEG BRAF NEG. Outside slides axillary LN specimen were reviewed/comparison to lung bx findings . She completed    cycle 6 of carboplatin/Abraxane completed 7/2018 .PET/CT  4/19/2018 revealed there has been a excellent response to therapy.  At least 90% reduction in malignant activity.PET/CT 2/21/2019 increased size and irregularity of the right axillary lymph node with increased FDG avidityMAMMO/US rt breast 2/2019 revealed suspicious findings rt axilla LN.  Right axilla, mass, core biopsy: Positive for poorly differentiated carcinoma breast primary ER neg/TN neg Her2 neg.Slides [facility] for outside reviewIt was determined  the lung tumor corresponds to a squamous cellcarcinoma and appears to be unrelated to the invasive ductal carcinoma of the breast. The axillary mass, in myopinion, corresponds to metastases of the breast primary. Although it is a poorly differentiated carcinoma, theimmunophenotype is most consistent with the one that the breast primary exhibited, and is inconsistent with metastatic squamous cell carcinoma. She was treated with  AC ( adriamycin 60m/m2/Cytoxan 600mg/m2)  q21d x 4 cycles completed 9/6/2019 . PET/CT 9/19/2019 shows disease response l decrease in  size and uptake of several right axillary lymph nodes and a supraclavicular lymph node.  Mild residual activity may indicate viable tumor.Pt followed by Rad/Onc. She was presented at [facility] tumor board and it was determined to proceed Radiation therapy only. No ALND due to concurrent lung and supraclavicular node. She  completed  RT 12/16/2019  To right axilla and right supraclavicular region PET/CT imaging  5/20/21 shows Continued increase in both size and hypermetabolic activity of right axillary level 1 lymph nodes a new, mildly hypermetabolic cutaneous thickening of the right breast. she underwent LYMPH NODE, RIGHT AXILLA, BIOPSY: 6/16/21 . Pathology showed Metastatic poorly-differentiated carcinoma, consistent with breast primary-ERneg/ PRneg  HER2  neg          Interval Hx: Pt started on pembrolizumab  S/p C8 completed 12/22/21   She reports pain Rt shoulder/RUE  She reports difficulty with ROM 2/2 pain  She is OTC tylenol arthritis   She [facility]   Prescribed muscle relaxants w/out relief  No fevers   Appetite and weight stable  Mild  Fatigue chronic, stable  She continues with SOB w/exertion-stable  Continues with intermittent chronic cough  She is followed by Dr. Katz, pulmonology  She underwent extensive workup and it was determined that worsening cough/SOB likely from radiation/COPD and allergic rhinits  She also has been followed by GI for dysphagia . She has undergone dilation      Last Mammo 6/16/21  No mammographic evidence of malignancy.BI-RADS Category 1: Negative      PET/CT 10/14/21 No definite evidence of hypermetabolic tumor.  Interval resolution of hypermetabolic right axillary lymph nodes.  No new hypermetabolic findings.    LYMPH NODE, RIGHT AXILLA, BIOPSY: 6/16/21   - Metastatic poorly-differentiated carcinoma, consistent with breast primary  -ER, MA, and HER2 immunohistochemical hormonal markers are also negative  PD-L1 (22C3) SemiQuant IHC,  Manual  Interpretation  Right axillary lymph node , specimen for PD-L1 immunohistochemistry studies (clone 22C3, [employer], [place], [place]; using a proprietary detection system) ([id number]):  Reported carcinoma site of origin: Breast  Result: The percent of PD-L1 positive cells based upon the total number of tumor cells (combined positive  score, CPS) is greater than or equal to 10.  Interpretation: Studies suggest that positive PD-L1 immunohistochemistry in tumor cells and/or tumorassociated  immune cells may predict tumor response to therapy with immune checkpoint inhibitors. This  result should not be used as the sole factor in determining treatment, as other factors (for example, tumor  mutation burden, and microsatellite instability) have been also studied as predictive markers.      PrevHx:She had an abnormal mammogram 6/4/2014 whichRevealed a round solid mass 6mm in rt breast.She then underwent U/S guided core bx of rt breast mass on 6/17/2014.Pathology revealed infiltrating ductal carcinoma Grade 3 with tumorPresent in thin-walled spaces suggestive of lymphatic spaces. HormoneReceptor status on tumor specimen revealed ER negative 0% ,RI negative 0% and Her 2 jose maria negative.She subsequently underwent rt segmental mastectomy and SLN bxOn 7/11/2014. Pathology of rt breast lumpectomy revealed invasiveDuctal carcinoma with micropapillary pattern ( Invasive micropapillaryCa) with max tumor dimension 11.5mm with suggestion of tumor in Thin walled spaces c/w lymphovascular involvement. SurgicalMargins free of tumor, grade 3, ER/RI neg , Her 2 jose maria neg With [place] Lymph node negative for Neoplasm. Pathologic staging [id number](i-)Her mother was diagnosed with breast cancer in her 50's/        Review of Systems   Constitutional: Positive for fatigue. Negative for appetite change, fever and unexpected weight change.   HENT: Negative for mouth sores and rhinorrhea.    Eyes: Negative for visual disturbance.  "  Respiratory: Positive for cough and shortness of breath (LOGAN, chronic, stable). Negative for wheezing.    Cardiovascular: Negative for chest pain.   Gastrointestinal: Negative for abdominal pain and diarrhea.   Genitourinary: Negative for frequency.   Musculoskeletal: Positive for arthralgias. Negative for back pain.   Skin: Negative for rash.   Neurological: Negative for dizziness and headaches.   Hematological: Negative for adenopathy.   Psychiatric/Behavioral: The patient is not nervous/anxious.        Objective:          Vitals:    01/12/22 0800   BP: (!) 155/84   BP Location: Left arm   Patient Position: Sitting   Pulse: 60   Temp: 97.6 °F (36.4 °C)   TempSrc: Oral   SpO2: 98%   Weight: 77.1 kg (169 lb 15.6 oz)   Height: 5' 3" (1.6 m)     '    Physical Exam   Constitutional: She is oriented to person, place, and time. She appears well-developed and well-nourished.   HENT:   Head: Normocephalic.   Mouth/Throat: Oropharynx is clear and moist. No oropharyngeal exudate.   Eyes: Conjunctivae and lids are normal. Pupils are equal, round, and reactive to light. No scleral icterus.   Neck: Normal range of motion. Neck supple. No thyromegaly present.   Cardiovascular: Normal rate, regular rhythm and normal heart sounds.    No murmur heard.  Pulmonary/Chest: Breath sounds normal. She has no wheezes. She has no rales.   Abdominal: Soft. Bowel sounds are normal. She exhibits no distension and no mass. There is no hepatosplenomegaly. There is no tenderness. There is no rebound and no guarding.   Musculoskeletal: Limited  range of motion 2/2 pain She exhibits TTP rt shoulder   Left breast- no masses or axillary LAD noted  Right breast- breast thickening inner quad    She has no cervical adenopathy.     She has rt axillary adenopathy.        Right: No supraclavicular adenopathy present.        Left: No supraclavicular adenopathy present.   Neurological: She is alert and oriented to person, place, and time. No cranial nerve " deficit. Coordination normal.   Skin: Skin is warm and dry. No ecchymosis, no petechiae and no rash noted. No erythema.   Psychiatric: She has a normal mood and affect.             CT a/p w/contrast 11/29/2018   1. No acute abnormality identified within the abdomen and pelvis.    2.  There are a few nonspecific prominent lymph nodes in the upper abdomen including a periesophageal lymph node which measures 1.0 cm in short axis diameter.    3.  Significant abdominal aortic atherosclerosis and abdominal aorta ectasia.    4.  Additional findings as above.    PET/CT 1/26/2018   1.  Intense FDG uptake within the known right infrahilar lung mass, compatible with malignancy.  It is unclear if this represents primary lung malignancy or metastatic breast cancer.    2.  Intensely hypermetabolic right axillary, retropectoral, and lower right cervical lymph nodes, compatible with metastases.    3.  Abnormal FDG uptake along right breast skin thickening, new from prior chest CTA and mammogram.  This may relate to localized edema/inflammation, though correlation with physical exam and mammography are recommended to exclude underlying inflammatory carcinoma.      MRI Brain w w/out contrast 2/9/2018  1.  No evidence of intracranial metastases.  2.  Sinus disease       Rt axillary LN bx at outside facility- on 1/16/2018 at [facility]  Pathology revealed metastatic poorly differentiated carcinoma of unknown primary  site 1/16/2018 at [facility]  TTF-1 negative, napsin negative  , cytokeratin 7 positive, cytokeratin 20 negative, p63 negative, cytokeratin 5/6 focal dim positivity    LUNG BIOPSY 1/31/2018  Pathology revealed benign lung tissue         SPECIMEN  1) Right Lung Bx 2/14/2018  Supplemental Diagnosis  Immunohistochemical stains show strong nuclear staining for p63 in essentially all tumor cells and very strong  cytoplasmic and membrane staining for CK5/6, also in essentially all tumor cells. TTF-1  and CK7 are negative  within tumor cells but do stain native pulmonary elements present within the biopsy. A stain for mucicarmine is  negative. Positive and negative controls function appropriately.  Final diagnosis: Specimen submitted as right lung biopsy  -Squamous cell carcinoma  (Electronically Signed: 2018-02-20 09:12:07 )  Diagnosed by: Phong Thompson  FINAL PATHOLOGIC DIAGNOSIS  Fragments of pulmonary parenchyma (submitted as right lung biopsy):    FINAL PATHOLOGIC DIAGNOSIS 1. Lymph node, right axilla, biopsy, review of 10 outside slides [facility], JS [phone],  collected January 11, 2018: Metastatic poorly differentiated carcinoma (see comment).  The histologic section shows fibrous stroma infiltrated by nest of poorly differentiated malignant cells including  occasional dyskeratotic cells morphologically suggestive of metastatic squamous cell carcinoma.  Immunohistochemical stains are nonspecific; the cells are positive for cytokeratin 7 and cytokeratin 5/6 (focal) and  negative for cytokeratin 20, TTF-1, p63, GCDFP, mammoglobin, and CEA        PET/CT 2/21/2019  Increased size and irregularity of the right axillary lymph node with increased FDG avidity.  Although this would be atypical in location for lung carcinoma, the increased size and avidity must be concerning for metastatic disease in this patient with history of squamous cell lung cancer.     US Rt breast and mammo 2/26/2019  Impression:  Right  Lymph Node: Right axilla lymph node. Assessment: 4 - Suspicious finding. Biopsy is recommended.      BI-RADS Category:   Right: 4 - Suspicious  Overall: 4 - Suspicious     Recommendation:  Biopsy is recommended. Biopsy of the lymph node measuring 1.4 x 1.1 x 1.3 recommended , the one with the round shape configuration, corresponding to the most recent PET CT finding.         Pathology 3/11/2019   FINAL PATHOLOGIC DIAGNOSIS  Right axilla, mass, core biopsy:  Positive for poorly  differentiated carcinoma.  See comment.  Comment:  The biopsy from the right axilla mass shows a poorly differentiated carcinoma in background lymphoid tissue  with enlarged cells showing increased nuclear size, prominent nucleoli, moderate amounts of cytoplasm and mitotic  figures. Immunostains are completed and reveal the tumor cells to stain positively with cytokeratin AE 1/AE3, CK  5/6 and CK 7. The tumor cells are negative for CK 20, Estrogen receptor, p63 and TTF-1. All stains have  satisfactory positive and negative controls. The patient's prior cases will be reviewed and an additional stain for  JUAREZ-3 is pending in an attempt to pinpoint the primary site of this malignancy and results will follow in a  supplemental report.      Supplemental Diagnosis  3/11/2019  The current axillary mass is compared to the patient's prior right lung biopsy (case number [id number]) and the  current axillary mass is morphologically similar to the lung malignancy. Also, the current axillary mass is compared  to the patient's prior breast resection (Case number [id number]) and appears similar as well. P63 is negative in the  [id number] tumor and CK 5/6 shows scattered positive staining in the [id number] tumor.  Immunostain for JUAREZ-3 is completed and shows only rare weak to moderate staining within the tumor cell nuclei  with satisfactory positive and negative controls. This stain is positive in the surrounding lymphocytes. This staining  pattern is non-specific and does not definitively differentiate between a lung and breast primary malignancy in this  right axilla mass biopsy. The staining profile of the current axillary mass match more closely with the previous  breast carcinoma (due to the p63 negativity in both the 2014 breast cancer and the current axillary mass lesion but  p63 positivity in the lung mass from 2018).  This staining profile, together with the comparison with the patient's prior breast tumor and prior lung  tumor supports  a diagnosis of poorly differentiated carcinoma and the malignancy appears morphologically similar to the prior  malignancies from both sites, but the staining profile in this small sample from the axillary mass more closely  correlates with the prior breast malignancy. Pathologic subclassification of this malignancy's primary location is not  definitive and clinical correlation is recommended definitively decide on possible primary location in this patient's  axillary mass sample.  Supplemental (2):  Additional immunohistochemical staining for progesterone receptor and HER2 are completed per clinician request  with following results:  Progesterone receptor: Negative; 0% nuclear tumor cell staining.  HER2: Negative; stain score = 0.  Supplemental (3):  [place] DIAGNOSIS:  FINAL DIAGNOSIS  Breast, right, needle core biopsy ([id number]; 06/17/2014): Invasive ductal carcinoma, [place] grade III (of  III), with focal micropapillary features.  Immunohistochemical stains performed at the referring institution show that the tumor cells have the following  phenotype: - Estrogen receptor: Negative (0% tumor cells staining). - Progesterone receptor: Negative (0% tumor  cells staining). - HER2: Negative (score 0).  Lymph nodes, right, sentinel biopsy and lumpectomy ([id number]; 07/11/2014):  1. Right sentinel lymph node: A single (1) lymph node is negative for metastatic carcinoma.  Immunohistochemical stains performed by the referring institution and reviewed at [facility] for cytokeratin are  negative, confirming the diagnosis.  2. Right breast: Invasive ductal carcinoma with micropapillary features, per report 11.5 mm in greatest dimension.  Foci suspicious for lymphovascular invasion identified. Margins of resection are free of tumor.  Immunohistochemical stains performed by the referring institution and reviewed at [facility] show that the tumor  cells are negative for p63 and show patchy positivity for  CK 5/6.  Lung, right, needle core biopsy ([id number]; 02/14/2018): Squamous cell carcinoma.    Immunohistochemical stains performed by the referring institution show the tumor cells are strongly positive for p63  and CK 5/6, while negative for TTF-1 and CK7. These result support the diagnosis. Axilla, right, needle core biopsy  ([id number]; 03/11/2019): Lymph node positive for metastatic carcinoma, most consistent with breast primary  (see comment).  Immunohistochemical stains performed by the referring institution and reviewed at [facility] show that the tumor  cells are negative for p63, focally positive for CK 5/6, focally and weakly positive for JUAREZ-3, and strongly positive  for CK7. They are also negative for estrogen receptor, progesterone receptor, and HER2 JAM (score 0).  COMMENT:  Thank you for allowing me to review the case of this 73-year-old lady who recently underwent needle core biopsies  of a right axillary mass and who has a previous diagnosis of an invasive ductal carcinoma of the right breast, as well  as a squamous cell carcinoma of the lung. I am reviewing this case because of my special interest in breast  pathology.  I agree with your interpretation of this case. In my opinion, the lung tumor corresponds to a squamous cell  carcinoma and appears to be unrelated to the invasive ductal carcinoma of the breast. The axillary mass, in my  opinion, corresponds to metastases of the breast primary. Although it is a poorly differentiated carcinoma, the  immunophenotype is most consistent with the one that the breast primary exhibited, and is inconsistent with a  metastatic squamous cell carcinoma. I did share the case with Dr. Anabel Stone, one of our pulmonary pathologists,  and she concurs with this interpretation.  Note: Report attached.  Performing location:  [facility]  [address] [place] [address]    CT neck/chest/abd/pelvis w/contrast 4/26/2019   The  previously described right hilar mass is no longer visible.  There is persistent volume loss in the right middle lobe this appears similar to the prior PET-CT February 21, 2019.    Persistent abnormal right axillary node today measuring about 15 mm compared 18 mm prior.  The positioning of the adjacent nodes is slightly different likely accounting for the slight difference in measurement.    Cluster of tree-in-bud nodular densities left lower lobe series 2, image 93.  This may be related to small airways disease though serial follow-up suggested.      PET/CT 6/20/2019   New and worsening hypermetabolic lymph nodes concerning for metastatic breast cancer as described above.  Tissue sampling would be required for definitive diagnosis.      2-D echo 6/27/2019   · Normal left ventricular systolic function. The estimated ejection fraction is 55%  · Concentric left ventricular remodeling.  · Normal LV diastolic function.  · Normal right ventricular systolic function.  · Moderate left atrial enlargement.  · Mild right atrial enlargement.  · Mild aortic regurgitation.  · Mild mitral regurgitation.  · Mild tricuspid regurgitation.  · Normal central venous pressure (3 mm Hg).  · The estimated PA systolic pressure is 25 mm Hg      PET/CT 9/19/2019   Favorable response to therapy with interval decrease in size and uptake of several right axillary lymph nodes and a supraclavicular lymph node.  Mild residual activity may indicate viable tumor.    No new hypermetabolic findings worrisome for malignancy.    PET/CT 2/28/2020  1.  No evidence of hypermetabolic tumor.  Continued improvement of the right axilla status post adjuvant radiation.    2.  No new hypermetabolic lesions      CTA 6/17/2020  1. No evidence of pulmonary embolus. No aortic dissection.   2. There is no evidence for new or progressive disease in the chest as compared to PET/CT 6/20/2019 in this patient with history of right breast cancer and right lung neoplasm. The  patient does have a more recent PET/CT 2/28/2020 from an outside facility per the electronic medical record although images are not available for direct comparison. Correlation with these images may be beneficial. Continued long-term surveillance recommended.  3. Stable appearance of the treated tumor in the right hilum/middle lobe.  4. Stable treated right breast cancer and axillary adenopathy without evidence for developing breast mass or new right axillary adenopathy.  5. Stable tiny nodularity/tree-in-bud densities in the left lung, probably postinfectious or inflammatory.  6. Wall thickening of the esophagus may be correlated for esophagitis. Possible tiny hiatus hernia.  7. Slight bronchial wall thickening may be correlated for bronchitis.  8. Mild to moderate emphysema as before.  9. Reflux of contrast into the IVC and hepatic veins is nonspecific but may be correlated for elevated right heart pressures.  10. Coronary calcifications and/or stents similar to prior.    Echo 5/21/2020  Technically difficult study.   Normal left ventricular systolic function.   Left ventricular ejection fraction is estimated at 55%.   Severe mitral annular calcification.   Mild mitral valve regurgitation.   Aortic valve sclerosis without stenosis or regurgitation.     PFTs 6/17/2020  Spirometry reveals a very severe obstructive lung defect, which does not significantly improve post bronchodilators. Lung volumes revealed air trapping. Diffusing capacity was moderately impaired.        Del 6/26/2020  BI-RADS Category:   Overall: 2 - Benign      PET/CT 10/23/2020   Impression:     Stable mildly hypermetabolic right axillary lymph node/nodular density contralateral to the site of injection.  Differential considerations include metastatic lymph node, reactive lymph node from benign right upper extremity process, and fat necrosis.  Recommend physical examination of the upper extremity and consideration of ultrasound for further  evaluation of the node/nodule and possible image guided biopsy.  Otherwise, attention on follow-up.     Otherwise, no other hypermetabolic disease identified      Mammo diagnostic right /US 11/4/2020   Impression:   1. No suspicious finding either on mammogram or ultrasound at the site of the palpable lump in the right breast at 10:00 o'clock. A bilateral mammogram is recommended in June 2020 to return to the patient to annual screening.   2. Redemonstration of the morphologically abnormal lymph node in the right axilla that contains a biopsy clip, compatible with the known recurrence metastatic breast cancer that has been treated with chemotherapy. The appearance of this lymph node is unchanged from 06/26/2020 and overall appears smaller when compared to 03/11/2019.     Abd US 12/11/2020   Impression:     1. Echogenic liver likely representing hepatic steatosis.  2. Right upper quadrant ultrasound otherwise is unremarkable.     PET/CT 10/14/21     Impression:     1.  No definite evidence of hypermetabolic tumor.  Interval resolution of hypermetabolic right axillary lymph nodes.  No new hypermetabolic findings.     2.  Persistent low-grade diffuse cutaneous uptake which is nonspecific.     Assessment:       1. Recurrent breast cancer, unspecified laterality    2. Malignant neoplasm of central portion of right breast in female, estrogen receptor negative    3. 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 History of lung cancer    5. Axillary lymphadenopathy    6. Acute pain of right shoulder    7. Chronic obstructive pulmonary disease, unspecified COPD type        Plan:     1-5   Pt with hx of Stage 1A invasive ductal carcinoma rt breast s/p rt segmental mastectomy and SLN bx 7/11/2014 ER/NV neg HER 2 jose maria neg [id number](i-).  S/p adjuvant RT completed 9/2014 Pt declined adjuvant chemo  Pt  hospitalized 11/2017 with acute-on-chronic  resp failure  Abnormal CT imaging revealing Large right hilar mass with involvement of   adjacent segmental pulmonary arterial branches and bronchi with associated postobstructive atelectasis  and involvement of mediastinal and axillary adenopathy   S/p rt axillary LN bx ( outside facility) - metastatic poorly differentiated carcinoma of unknown primary  site  status post  lung biopsy 1/31/2017 -benign lung tissue  PET/CT 1/26/2018   Intense FDG uptake within the known right infrahilar lung mass, compatible with malignancy.   Intensely hypermetabolic right axillary, retropectoral, and lower right cervical lymph nodes, compatible with metastases.  Abnormal FDG uptake along right breast skin thickening, new from prior chest CTA and mammogram.    Repeat lung bx 2/14/2018 revealed Squamous Cell CA lung PD-L1 10% EGFR NEGALK NEG  Pt treated for advanced squamous cell CA of lung   She s/p  cycle 6 of carboplatin/Abraxane Day1 completed 7/2/2018 ( day 15 held due to prolonged cytopenias)  She completed therapy with near CR     PET/CT 2/21/2019 showed increased size and irregularity of the right axillary lymph node with increased FDG avidity     MAMMO/US rt breast 3/2019  reveals suspicious findings rt axilla LN.  Biopsy of the lymph node measuring 1.4 x 1.1 x 1.3     Pt s/p  rt axillary LN bxPositive for poorly differentiated carcinoma.- likely breast primary ERneg PRneg Her 2 neg    Outside review of specimen(s) revealed  lung tumor corresponds to a squamous cell carcinoma and appears to be unrelated to the invasive ductal carcinoma of the breast. It was determined The axillary mass, in my opinion, corresponded  to metastases of the breast primary. Although it is a poorly differentiated carcinoma, theimmunophenotype is most consistent with the one that the breast primary exhibited, and is inconsistent with a metastatic squamous cell carcinoma.     CT imaging 4/26/2019 persistent rt axillary LAD, no other sites of disease     Lymph node biopsy 3/11/2019 Right axilla, mass, core biopsy:Positive for poorly  differentiated carcinoma.favor breast primary     PET/CT 6/20/2019  New and worsening hypermetabolic lymph nodes concerning for metastatic breast cancer     Previously Discussed  imaging findings in detail with patient which reveals new and worsening hypermetabolic melena metabolic lymph nodes including hypermetabolic supraclavicular node.  Therefore, at treatment option will be systemic chemotherapy in light of the recent imaging findings.  She will be followed by her surgical oncologist Dr. Christopher      IT was determined to proceed with systemic chemotherapy   S/p  AC [id number] x 3 wks x 4 wks completed 9/6/2019   ( pt has not received in past)      PET/CT 9/19/2019 shows disease response with interval decrease in size and uptake of several right axillary lymph nodes and a supraclavicular lymph node.  Mild residual activity may indicate viable tumor.No new hypermetabolic findings worrisome for malignancy.    Pt followed by  Breast Surgery and plan was  for possible   ALND. Pt with Recurrent cancer in her right axilla and right supraclavicular fossa.   She completed chemotherapy 9/6/2019 with near CR  It was determined  Radiation will be given to the original areas of hypermetabolic activity in the right axilla and right supraclavicular region    Pt completed  RT 12/16/2019     PET/CT 1/14/21 shows   New right axillary hypermetabolic lymph nodes with preserved normal architecture suggestive of reactive nodes.  Stable appearing previously identified hypermetabolic lymph node with mild increase in surrounding fat stranding.        Findings previously d/w with treating breast surgeon and it was determined to cont to monitor and close f/u as pt is heavily pre treated, has received RT to site   Pt acknowledged understanding and agreeable with plan     PET/CT imaging  5/20/21 shows Continued increase in both size and hypermetabolic activity of right axillary level 1 lymph nodes a new, mildly hypermetabolic cutaneous  thickening of the right breast.     Right breast, skin, punch biopsy:Negative for carcinoma    LYMPH NODE, RIGHT AXILLA, BIOPSY: 6/16/21   - Metastatic poorly-differentiated carcinoma, consistent with breast primary triple neg  PD-L1 immunohistochemistry studies(combined positive score, CPS) is greater than or equal to 10   PET/CT showed  No definite evidence of hypermetabolic tumor.  Interval resolution of hypermetabolic right axillary lymph nodes.  No new hypermetabolic findings.   S/p C8 completed 12/22/21  Proceed with next cycle   Plan follow-up PET/CT imaging prior to f/u   Labs prior to therapy in 3 weeks and prior to f/u 6 weeks    6. Plan MRI rt shoulder  OTC meds w/out relief  Rx sent for Tramadol 50 mg q 8hr prn pain     7  Followed by pulmonology. Cont MDI and O2 as prescribed     Schedule MRI and PET prior to f/u   cbc,cmp, TSH  prior to chemo in 3 wks and  and f/u 6 weeks     Advance Care Planning     Power of   I previously  initiated the process of advance care planning today and explained the importance of this process to the patient.  I introduced the concept of advance directives to the patient, as well. Then the patient received detailed information about the importance of designating a Health Care Power of  (HCPOA). She was also instructed to communicate with this person about their wishes for future healthcare, should she become sick and lose decision-making capacity. The patient has previously appointed a HCPOA. After our discussion, the patient has decided to complete a HCPOA and has appointed her son and niece and  I spent a total time of 16 minutes discussing this issue with the patient.         Cc: Swetha Katz M.D.         MD Tory Roberson MD Raymond Gould, MD

## 2022-01-18 ENCOUNTER — PES CALL (OUTPATIENT)
Dept: ADMINISTRATIVE | Facility: CLINIC | Age: 77
End: 2022-01-18
Payer: MEDICARE

## 2022-01-25 DIAGNOSIS — I20.89 STABLE ANGINA: Chronic | ICD-10-CM

## 2022-01-25 RX ORDER — ISOSORBIDE MONONITRATE 30 MG/1
30 TABLET, EXTENDED RELEASE ORAL DAILY
Qty: 30 TABLET | Refills: 11 | Status: ON HOLD | OUTPATIENT
Start: 2022-01-25 | End: 2022-04-07 | Stop reason: SDUPTHER

## 2022-01-25 NOTE — TELEPHONE ENCOUNTER
Care Due:                  Date            Visit Type   Department     Provider  --------------------------------------------------------------------------------                                             [facility]                                           MEDICINE/INTERN  Last Visit: 12-      None         AL SAMRA Minor  Next Visit: None Scheduled  None         None Found                                                            Last  Test          Frequency    Reason                     Performed    Due Date  --------------------------------------------------------------------------------    Office Visit  12 months..  montelukast..............  12- 12-    Powered by [employer]. Reference number: [id number].   1/25/2022 8:22:48 AM CST

## 2022-01-25 NOTE — TELEPHONE ENCOUNTER
Last Office Visit Info:   The patient's last visit with Jeannine Huitron MD was on 12/7/2020.    The patient's last visit in current department was on Visit date not found.        Last CBC Results:   Lab Results   Component Value Date    WBC 5.43 01/12/2022    HGB 14.1 01/12/2022    HCT 45.2 01/12/2022     01/12/2022       Last CMP Results  Lab Results   Component Value Date     01/12/2022    K 4.5 01/12/2022     01/12/2022    CO2 31 (H) 01/12/2022    BUN 16 01/12/2022    CREATININE 0.8 01/12/2022    CALCIUM 9.7 01/12/2022    ALBUMIN 4.1 01/12/2022    AST 20 01/12/2022    ALT 25 01/12/2022       Last Lipids  Lab Results   Component Value Date    CHOL 139 09/18/2020    TRIG 191 (H) 09/18/2020    HDL 51 09/18/2020    LDLCALC 49.8 (L) 09/18/2020       Last A1C  Lab Results   Component Value Date    HGBA1C 6.0 (H) 04/05/2021       Last TSH  Lab Results   Component Value Date    TSH 3.340 01/12/2022             Current Med Refills  Medication List with Changes/Refills   Current Medications    ALBUTEROL (PROVENTIL) 2.5 MG /3 ML (0.083 %) NEBULIZER SOLUTION    NEBULIZE 1 vial EVERY 6 HOURS AS NEEDED FOR WHEEZING       Start Date: 2/8/2017  End Date: --    ALBUTEROL (VENTOLIN HFA) 90 MCG/ACTUATION INHALER    INHALE 2 PUFF(S) BY MOUTH EVERY 4 - 6 HOURS AS NEEDED FOR SHORTNESS OF BREATH or WHEEZING       Start Date: 8/26/2014 End Date: --    ASPIRIN (ECOTRIN) 81 MG EC TABLET    Take 81 mg by mouth once daily.        Start Date: --        End Date: --    FLUTICASONE PROPIONATE (FLONASE) 50 MCG/ACTUATION NASAL SPRAY    USE TWO SPRAYS IN EACH NOSTRIL ONCE A DAY       Start Date: 10/18/2021End Date: --    ISOSORBIDE MONONITRATE (IMDUR) 30 MG 24 HR TABLET    TAKE 1 TABLET BY MOUTH EVERY DAY       Start Date: 2/2/2021  End Date: --    MECLIZINE (ANTIVERT) 25 MG TABLET    CHEW and SWALLOW 1 TABLET(S) BY MOUTH EVERY 6 HOURS AS NEEDED FOR DIZZINESS       Start Date: 11/10/2021End Date: --    METOPROLOL TARTRATE  (LOPRESSOR) 25 MG TABLET    TAKE ONE TABLET BY MOUTH TWICE DAILY FOR BLOOD PRESSURE       Start Date: 4/6/2021  End Date: --    MONTELUKAST (SINGULAIR) 10 MG TABLET    TAKE 1 TABLET(S) BY MOUTH EVERY EVENING       Start Date: 10/27/2021End Date: --    NITROGLYCERIN (NITROSTAT) 0.4 MG SL TABLET    place 1 tablet under the tongue AS NEEDED. no more than 3 in 15 minutes       Start Date: 3/9/2020  End Date: --    NYSTATIN (MYCOSTATIN) CREAM    APPLY TO THE AFFECTED AREAS TWICE DAILY       Start Date: 1/17/2022 End Date: --    OMEPRAZOLE (PRILOSEC) 40 MG CAPSULE    Take 40 mg by mouth once daily.       Start Date: 1/4/2021  End Date: --    PAZEO 0.7 % DROP    instill 1 drop IN BOTH EYES daily       Start Date: 9/27/2019 End Date: --    ROSUVASTATIN (CRESTOR) 10 MG TABLET    TAKE 1 TABLET BY MOUTH EVERY DAY       Start Date: 2/2/2021  End Date: --    TRAMADOL (ULTRAM) 50 MG TABLET    Take 1 tablet (50 mg total) by mouth every 8 (eight) hours as needed for Pain.       Start Date: 1/12/2022 End Date: 2/11/2022    TRELEGY ELLIPTA 100-62.5-25 MCG DSDV    INHALE ONE PUFF INTO THE LUNGS ONCE A DAY       Start Date: 10/25/2018End Date: --       Order(s) placed per written order guidelines:     Please advise.

## 2022-01-26 ENCOUNTER — HOSPITAL ENCOUNTER (OUTPATIENT)
Dept: RADIOLOGY | Facility: HOSPITAL | Age: 77
Discharge: HOME | End: 2022-01-26
Attending: INTERNAL MEDICINE
Payer: MEDICARE

## 2022-01-26 ENCOUNTER — LAB VISIT (OUTPATIENT)
Dept: LAB | Facility: HOSPITAL | Age: 77
End: 2022-01-26
Attending: INTERNAL MEDICINE
Payer: MEDICARE

## 2022-01-26 DIAGNOSIS — C50.111 MALIGNANT NEOPLASM OF CENTRAL PORTION OF RIGHT BREAST IN FEMALE, ESTROGEN RECEPTOR NEGATIVE: ICD-10-CM

## 2022-01-26 DIAGNOSIS — Z17.1 MALIGNANT NEOPLASM OF CENTRAL PORTION OF RIGHT BREAST IN FEMALE, ESTROGEN RECEPTOR NEGATIVE: ICD-10-CM

## 2022-01-26 DIAGNOSIS — Z85.118 HISTORY OF LUNG CANCER: ICD-10-CM

## 2022-01-26 DIAGNOSIS — C50.919 RECURRENT BREAST CANCER, UNSPECIFIED LATERALITY: ICD-10-CM

## 2022-01-26 DIAGNOSIS — M25.511 ACUTE PAIN OF RIGHT SHOULDER: ICD-10-CM

## 2022-01-26 DIAGNOSIS — R59.0 AXILLARY LYMPHADENOPATHY: ICD-10-CM

## 2022-01-26 LAB
ALBUMIN SERPL BCP-MCNC: 3.9 G/DL (ref 3.5–5.2)
ALP SERPL-CCNC: 53 U/L (ref 55–135)
ALT SERPL W/O P-5'-P-CCNC: 16 U/L (ref 10–44)
ANION GAP SERPL CALC-SCNC: 6 MMOL/L (ref 8–16)
AST SERPL-CCNC: 18 U/L (ref 10–40)
BILIRUB SERPL-MCNC: 0.4 MG/DL (ref 0.1–1)
BUN SERPL-MCNC: 13 MG/DL (ref 8–23)
CALCIUM SERPL-MCNC: 9 MG/DL (ref 8.7–10.5)
CHLORIDE SERPL-SCNC: 104 MMOL/L (ref 95–110)
CO2 SERPL-SCNC: 29 MMOL/L (ref 23–29)
CREAT SERPL-MCNC: 0.8 MG/DL (ref 0.5–1.4)
EST. GFR  (AFRICAN AMERICAN): >60 ML/MIN/1.73 M^2
EST. GFR  (NON AFRICAN AMERICAN): >60 ML/MIN/1.73 M^2
GLUCOSE SERPL-MCNC: 122 MG/DL (ref 70–110)
POCT GLUCOSE: 83 MG/DL (ref 70–110)
POTASSIUM SERPL-SCNC: 4.2 MMOL/L (ref 3.5–5.1)
PROT SERPL-MCNC: 6.8 G/DL (ref 6–8.4)
SODIUM SERPL-SCNC: 139 MMOL/L (ref 136–145)

## 2022-01-26 PROCEDURE — A9552 F18 FDG: HCPCS | Mod: HCNC

## 2022-01-26 PROCEDURE — 78815 PET IMAGE W/CT SKULL-THIGH: CPT | Mod: TC,HCNC

## 2022-01-26 PROCEDURE — 25500020 PHARM REV CODE 255: Mod: HCNC | Performed by: INTERNAL MEDICINE

## 2022-01-26 PROCEDURE — 73223 MRI JOINT UPR EXTR W/O&W/DYE: CPT | Mod: 26,HCNC,RT, | Performed by: RADIOLOGY

## 2022-01-26 PROCEDURE — 73223 MRI JOINT UPR EXTR W/O&W/DYE: CPT | Mod: TC,HCNC,RT

## 2022-01-26 PROCEDURE — 73223 MRI SHOULDER W WO CONTRAST RIGHT: ICD-10-PCS | Mod: 26,HCNC,RT, | Performed by: RADIOLOGY

## 2022-01-26 PROCEDURE — 78815 NM PET CT ROUTINE: ICD-10-PCS | Mod: 26,PS,HCNC, | Performed by: RADIOLOGY

## 2022-01-26 PROCEDURE — A9585 GADOBUTROL INJECTION: HCPCS | Mod: HCNC | Performed by: INTERNAL MEDICINE

## 2022-01-26 PROCEDURE — 78815 PET IMAGE W/CT SKULL-THIGH: CPT | Mod: 26,PS,HCNC, | Performed by: RADIOLOGY

## 2022-01-26 PROCEDURE — 36415 COLL VENOUS BLD VENIPUNCTURE: CPT | Mod: HCNC | Performed by: INTERNAL MEDICINE

## 2022-01-26 PROCEDURE — 80053 COMPREHEN METABOLIC PANEL: CPT | Mod: HCNC | Performed by: INTERNAL MEDICINE

## 2022-01-26 RX ORDER — GADOBUTROL 604.72 MG/ML
8 INJECTION INTRAVENOUS
Status: COMPLETED | OUTPATIENT
Start: 2022-01-26 | End: 2022-01-26

## 2022-01-26 RX ADMIN — GADOBUTROL 8 ML: 604.72 INJECTION INTRAVENOUS at 11:01

## 2022-01-26 NOTE — PROGRESS NOTES
SUBJECTIVE     Chief Complaint   Patient presents with    Cough       HPI  Ade Castellano is a 74 y.o. female with multiple medical diagnoses as listed in the medical history and problem list that presents for evaluation of URI x 4 months. Pt reports a mixed dry/productive cough, SOB, and wheezing. Denies any fever, chills, or night sweats. Pt has been taking cough drops, cough syrups, and took a course of Doxycycline without improvement of symptoms. Denies any sick contacts/recent travel. Pt has been inside on self-quarantine since 3/2020.    PAST MEDICAL HISTORY:  Past Medical History:   Diagnosis Date    Acute respiratory failure with hypoxia and hypercapnia 11/29/2017    Angina pectoris     Arthritis     Bell's palsy     left facial weakness    Breast cancer     RIGHT    CAD (coronary artery disease)     Cervical cancer     Chronic bronchitis     COPD (chronic obstructive pulmonary disease)     Dr. Sterling Krueger bridge present     Emphysema of lung     H/O colonoscopy 06/2017    due for repeat colonsocopy in 6/2018    History of Bell's palsy     History of heart artery stent     Dr. Ortiz  x2 stents    Hyperlipidemia     Hypertension     Lung cancer     Myocardial infarction     SUNITHA (obstructive sleep apnea)     intolerant to mask    SUNITHA (obstructive sleep apnea)     intolerant to mask     Pneumonia     Pneumonia due to other staphylococcus     PUD (peptic ulcer disease)     Severe anemia 6/11/2018    Sleep apnea     Vaginal delivery     x1       PAST SURGICAL HISTORY:  Past Surgical History:   Procedure Laterality Date    ADENOIDECTOMY      BREAST BIOPSY Right     x3    BREAST LUMPECTOMY      BREAST SURGERY      lumpectomy right side     CERVIX SURGERY      cone    COLONOSCOPY N/A 3/17/2017    Procedure: COLONOSCOPY;  Surgeon: Julio Rudd MD;  Location: [facility];  Service: Endoscopy;  Laterality: N/A;    COLONOSCOPY N/A 6/30/2017    Procedure: COLONOSCOPY;  Surgeon: Julio  MD Blaire;  Location: [facility] ENDO;  Service: Endoscopy;  Laterality: N/A;    COLONOSCOPY N/A 2018    Procedure: COLONOSCOPY;  Surgeon: Nura Urbina MD;  Location: [facility] ENDO;  Service: Endoscopy;  Laterality: N/A;    COLONOSCOPY N/A 2019    Procedure: COLONOSCOPY;  Surgeon: Emmanuel Perez MD;  Location: [facility] ENDO;  Service: Endoscopy;  Laterality: N/A;  confirmed appt-SP    EYE SURGERY Bilateral 2018    cataract     PORTACATH PLACEMENT Right 2018    sweat glands axillary regions Bilateral     TONSILLECTOMY         SOCIAL HISTORY:  Social History     Socioeconomic History    Marital status: Single     Spouse name: Not on file    Number of children: Not on file    Years of education: Not on file    Highest education level: Not on file   Occupational History    Not on file   Social Needs    Financial resource strain: Not on file    Food insecurity:     Worry: Not on file     Inability: Not on file    Transportation needs:     Medical: Not on file     Non-medical: Not on file   Tobacco Use    Smoking status: Former Smoker     Packs/day: 0.25     Years: 50.00     Pack years: 12.50     Types: Cigarettes     Last attempt to quit: 2017     Years since quittin.5    Smokeless tobacco: Never Used   Substance and Sexual Activity    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     Drug use: No    Sexual activity: Never   Lifestyle    Physical activity:     Days per week: Not on file     Minutes per session: Not on file    Stress: Not at all   Relationships    Social connections:     Talks on phone: Not on file     Gets together: Not on file     Attends Restorationism service: Not on file     Active member of club or organization: Not on file     Attends meetings of clubs or organizations: Not on file     Relationship status: Not on file   Other Topics Concern    Not on file   Social History Narrative    Not on file       FAMILY HISTORY:  Family History   Problem Relation Age of  Onset    Cancer Mother         breast    Heart disease Mother     Breast cancer Mother     Cancer Father         lung-smoker     Cancer Sister         lung-smoker     Heart attack Sister     Cancer Maternal Grandmother     Heart disease Maternal Grandmother     Cancer Maternal Grandfather     Heart disease Maternal Grandfather     Cancer Paternal Grandmother     Cancer Paternal Grandfather     Cancer Sister         mets not sure where it started     COPD Sister     Kidney cancer Son        ALLERGIES AND MEDICATIONS: updated and reviewed.  Review of patient's allergies indicates:  No Known Allergies  Current Outpatient Medications   Medication Sig Dispense Refill    albuterol (PROVENTIL) 2.5 mg /3 mL (0.083 %) nebulizer solution NEBULIZE 1 vial EVERY 6 HOURS AS NEEDED FOR WHEEZING 540 mL 1    albuterol (VENTOLIN HFA) 90 mcg/actuation inhaler INHALE 2 PUFF(S) BY MOUTH EVERY 4 - 6 HOURS AS NEEDED FOR SHORTNESS OF BREATH or WHEEZING 18 g 5    aspirin (ECOTRIN) 325 MG EC tablet Take 325 mg by mouth once daily.      desloratadine (CLARINEX) 5 mg tablet Take 1 tablet (5 mg total) by mouth once daily. 30 tablet 5    fluticasone propionate (FLONASE) 50 mcg/actuation nasal spray USE TWO SPRAYS IN EACH NOSTRIL ONCE A DAY 16 g 5    influenza (FLUZONE HIGH-DOSE) 180 mcg/0.5 mL vaccine Inject 0.5 mLs into the muscle.      isosorbide mononitrate (IMDUR) 30 MG 24 hr tablet Take 1 tablet (30 mg total) by mouth once daily. 30 tablet 11    metoprolol tartrate (LOPRESSOR) 25 MG tablet Take 1 tablet (25 mg total) by mouth 2 (two) times daily. 180 tablet 2    nitroGLYCERIN (NITROSTAT) 0.4 MG SL tablet place 1 tablet under the tongue AS NEEDED. no more than 3 in 15 minutes 25 tablet 0    PAZEO 0.7 % Drop instill 1 drop IN BOTH EYES daily  11    rosuvastatin (CRESTOR) 10 MG tablet Take 1 tablet (10 mg total) by mouth once daily. 30 tablet 11    TRELEGY ELLIPTA 100-62.5-25 mcg DsDv INHALE ONE PUFF INTO THE LUNGS  "ONCE A DAY  5    levoFLOXacin (LEVAQUIN) 500 MG tablet Take 1 tablet (500 mg total) by mouth once daily. 7 tablet 0    predniSONE (DELTASONE) 50 MG Tab Take 1 tablet (50 mg total) by mouth once daily. for 5 days 5 tablet 0    promethazine-dextromethorphan (PROMETHAZINE-DM) 6.25-15 mg/5 mL Syrp Take 5 mLs by mouth 2 (two) times daily as needed. 120 mL 0     No current facility-administered medications for this visit.        ROS  Review of Systems   Constitutional: Negative for chills and fever.   HENT: Negative for hearing loss and sore throat.    Eyes: Negative for visual disturbance.   Respiratory: Positive for cough, shortness of breath and wheezing.    Cardiovascular: Negative for chest pain, palpitations and leg swelling.   Gastrointestinal: Negative for abdominal pain, constipation, diarrhea, nausea and vomiting.   Genitourinary: Negative for dysuria, frequency and urgency.   Musculoskeletal: Negative for arthralgias, joint swelling and myalgias.   Skin: Negative for rash and wound.   Neurological: Negative for headaches.   Psychiatric/Behavioral: Negative for agitation and confusion. The patient is not nervous/anxious.          OBJECTIVE     Physical Exam  Vitals:    06/11/20 0843   BP: 134/82   Pulse: (!) 56   Temp: 97.6 °F (36.4 °C)    Body mass index is 32.26 kg/m².  Weight: 82.6 kg (182 lb 1.6 oz)   Height: 5' 3" (160 cm)     Physical Exam   Constitutional: She is oriented to person, place, and time. She appears well-developed and well-nourished. No distress.   HENT:   Head: Normocephalic and atraumatic.   Right Ear: External ear normal.   Left Ear: External ear normal.   Nose: Nose normal.   Mouth/Throat: Oropharynx is clear and moist.   Eyes: Conjunctivae and EOM are normal. Right eye exhibits no discharge. Left eye exhibits no discharge. No scleral icterus.   Neck: Normal range of motion. Neck supple. No JVD present. No tracheal deviation present.   Cardiovascular: Normal rate, regular rhythm and " intact distal pulses. Exam reveals no gallop and no friction rub.   No murmur heard.  Pulmonary/Chest: Effort normal and breath sounds normal. No respiratory distress. She has no wheezes.   Abdominal: Soft. Bowel sounds are normal. She exhibits no distension and no mass. There is no tenderness. There is no rebound and no guarding.   Musculoskeletal: Normal range of motion. She exhibits no edema, tenderness or deformity.   Neurological: She is alert and oriented to person, place, and time. She exhibits normal muscle tone. Coordination normal.   Skin: Skin is warm and dry. No rash noted. No erythema.   Psychiatric: She has a normal mood and affect. Her behavior is normal. Judgment and thought content normal.         Health Maintenance       Date Due Completion Date    TETANUS VACCINE 10/08/1964 ---    Shingles Vaccine (1 of 2) 10/08/1996 ---    DEXA SCAN 03/08/2020 3/8/2017    Mammogram 03/11/2021 3/11/2019    High Dose Statin 06/11/2021 6/11/2020    Colonoscopy 01/29/2022 1/29/2019    Lipid Panel 02/03/2025 2/3/2020            ASSESSMENT     74 y.o. female with     1. Chronic cough    2. Chronic obstructive pulmonary disease with acute exacerbation    3. Squamous cell carcinoma of lung, unspecified laterality        PLAN:     1. Chronic cough  - Likely 2/2 COPD and lung CA, but will check COVID19 and pt advised to self-quarantine while awaiting results  - COVID-19 Routine Screening; Future  - promethazine-dextromethorphan (PROMETHAZINE-DM) 6.25-15 mg/5 mL Syrp; Take 5 mLs by mouth 2 (two) times daily as needed.  Dispense: 120 mL; Refill: 0  - COVID-19 Routine Screening    2. Chronic obstructive pulmonary disease with acute exacerbation  - Pt advised to take Abx to completion  - X-Ray Chest PA And Lateral; Future  - predniSONE (DELTASONE) 50 MG Tab; Take 1 tablet (50 mg total) by mouth once daily. for 5 days  Dispense: 5 tablet; Refill: 0  - levoFLOXacin (LEVAQUIN) 500 MG tablet; Take 1 tablet (500 mg total) by mouth  once daily.  Dispense: 7 tablet; Refill: 0    3. Squamous cell carcinoma of lung, unspecified laterality  - Continue management per Heme/Onc        RTC in 1-2 weeks as needed for any acute worsening of current condition or failure to improve       Vera Conrad MD  06/12/2020 8:57 AM        No follow-ups on file.                 actual/standing

## 2022-01-27 ENCOUNTER — TELEPHONE (OUTPATIENT)
Dept: HEMATOLOGY/ONCOLOGY | Facility: CLINIC | Age: 77
End: 2022-01-27
Payer: MEDICARE

## 2022-01-27 NOTE — TELEPHONE ENCOUNTER
Pt called to check on status of pet scan results. Pt told if she does not receive a call from dr. acuña by the morning to call our office per alejandrina.

## 2022-01-28 ENCOUNTER — TELEPHONE (OUTPATIENT)
Dept: HEMATOLOGY/ONCOLOGY | Facility: CLINIC | Age: 77
End: 2022-01-28
Payer: MEDICARE

## 2022-01-28 DIAGNOSIS — S46.811A TEAR OF RIGHT INFRASPINATUS TENDON, INITIAL ENCOUNTER: Primary | ICD-10-CM

## 2022-01-28 NOTE — TELEPHONE ENCOUNTER
Dr Rebolledo reviewed results of MRI and PET Scan w/ patient as Dr Holliday is out of office  Dr Rebolledo recommended pt see ortho for shoulder but at this time she declines She will contact office if she chooses to follow up w/ ortho

## 2022-01-28 NOTE — TELEPHONE ENCOUNTER
Reviewed MRI and PET CT with patient.  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  Told her she will need to see a specialist and their team (orthopedic+/-physical therapy or PM&R) to develop a plan for her rehabilitation.  Told her I don't think she will need surgery for the findings on the MRI.    Star Rebolledo MD  Hematology Oncology

## 2022-01-31 ENCOUNTER — TELEPHONE (OUTPATIENT)
Dept: FAMILY MEDICINE | Facility: CLINIC | Age: 77
End: 2022-01-31
Payer: MEDICARE

## 2022-01-31 NOTE — TELEPHONE ENCOUNTER
----- Message from Dania Cabezas sent at 1/31/2022  2:26 PM CST -----  Regarding: Patient call back  Who called:CHOLO HARRIS [[id number]]    What is the request in detail: Patient is requesting a call back. She states she would like to know if she can change her appt tomorrow to 2/2 around the same time. Attempted to r/s, no appts available. She would like to further discuss.   Please advise.    Can the clinic reply by [facility]? No    Best call back number: [phone]    Additional Information: N/A

## 2022-01-31 NOTE — TELEPHONE ENCOUNTER
Return call to Pt, and she was asking to reschedule her H[facility] appointment. Pt given the number to that department to see about rescheduling.

## 2022-02-01 ENCOUNTER — OFFICE VISIT (OUTPATIENT)
Dept: FAMILY MEDICINE | Facility: CLINIC | Age: 77
End: 2022-02-01
Payer: MEDICARE

## 2022-02-01 VITALS
RESPIRATION RATE: 16 BRPM | OXYGEN SATURATION: 95 % | DIASTOLIC BLOOD PRESSURE: 80 MMHG | WEIGHT: 169.56 LBS | HEIGHT: 62 IN | HEART RATE: 55 BPM | BODY MASS INDEX: 31.2 KG/M2 | SYSTOLIC BLOOD PRESSURE: 118 MMHG | TEMPERATURE: 97 F

## 2022-02-01 DIAGNOSIS — C50.919 METASTATIC BREAST CANCER: Chronic | ICD-10-CM

## 2022-02-01 DIAGNOSIS — R73.03 PREDIABETES: ICD-10-CM

## 2022-02-01 DIAGNOSIS — E78.5 HYPERLIPIDEMIA LDL GOAL <70: Chronic | ICD-10-CM

## 2022-02-01 DIAGNOSIS — I50.32 CHRONIC HEART FAILURE WITH PRESERVED EJECTION FRACTION: ICD-10-CM

## 2022-02-01 DIAGNOSIS — Z00.00 ENCOUNTER FOR PREVENTIVE HEALTH EXAMINATION: Primary | ICD-10-CM

## 2022-02-01 DIAGNOSIS — C34.90 SQUAMOUS CELL CARCINOMA OF LUNG, UNSPECIFIED LATERALITY: ICD-10-CM

## 2022-02-01 DIAGNOSIS — Z79.899 PATIENT ON ANTINEOPLASTIC CHEMOTHERAPY REGIMEN: ICD-10-CM

## 2022-02-01 DIAGNOSIS — Z66 DNR (DO NOT RESUSCITATE): Chronic | ICD-10-CM

## 2022-02-01 DIAGNOSIS — J44.9 STAGE 3 SEVERE COPD BY GOLD CLASSIFICATION: ICD-10-CM

## 2022-02-01 DIAGNOSIS — I10 PRIMARY HYPERTENSION: ICD-10-CM

## 2022-02-01 DIAGNOSIS — C79.9 SECONDARY MALIGNANT NEOPLASM OF UNSPECIFIED SITE (CODE): ICD-10-CM

## 2022-02-01 DIAGNOSIS — C34.80 MALIGNANT NEOPLASM OF OVERLAPPING SITES OF LUNG, UNSPECIFIED LATERALITY: ICD-10-CM

## 2022-02-01 DIAGNOSIS — Z12.11 COLON CANCER SCREENING: ICD-10-CM

## 2022-02-01 DIAGNOSIS — I25.118 CORONARY ARTERY DISEASE OF NATIVE ARTERY OF NATIVE HEART WITH STABLE ANGINA PECTORIS: ICD-10-CM

## 2022-02-01 DIAGNOSIS — I70.0 ATHEROSCLEROSIS OF AORTA: Chronic | ICD-10-CM

## 2022-02-01 DIAGNOSIS — Z99.81 DEPENDENCE ON SUPPLEMENTAL OXYGEN: ICD-10-CM

## 2022-02-01 PROCEDURE — 1126F AMNT PAIN NOTED NONE PRSNT: CPT | Mod: HCNC,CPTII,S$GLB, | Performed by: NURSE PRACTITIONER

## 2022-02-01 PROCEDURE — 1157F PR ADVANCE CARE PLAN OR EQUIV PRESENT IN MEDICAL RECORD: ICD-10-PCS | Mod: HCNC,CPTII,S$GLB, | Performed by: NURSE PRACTITIONER

## 2022-02-01 PROCEDURE — 3074F PR MOST RECENT SYSTOLIC BLOOD PRESSURE < 130 MM HG: ICD-10-PCS | Mod: HCNC,CPTII,S$GLB, | Performed by: NURSE PRACTITIONER

## 2022-02-01 PROCEDURE — 3079F DIAST BP 80-89 MM HG: CPT | Mod: HCNC,CPTII,S$GLB, | Performed by: NURSE PRACTITIONER

## 2022-02-01 PROCEDURE — 1159F PR MEDICATION LIST DOCUMENTED IN MEDICAL RECORD: ICD-10-PCS | Mod: HCNC,CPTII,S$GLB, | Performed by: NURSE PRACTITIONER

## 2022-02-01 PROCEDURE — G0439 PPPS, SUBSEQ VISIT: HCPCS | Mod: HCNC,S$GLB,, | Performed by: NURSE PRACTITIONER

## 2022-02-01 PROCEDURE — 1157F ADVNC CARE PLAN IN RCRD: CPT | Mod: HCNC,CPTII,S$GLB, | Performed by: NURSE PRACTITIONER

## 2022-02-01 PROCEDURE — 3288F PR FALLS RISK ASSESSMENT DOCUMENTED: ICD-10-PCS | Mod: HCNC,CPTII,S$GLB, | Performed by: NURSE PRACTITIONER

## 2022-02-01 PROCEDURE — 99499 UNLISTED E&M SERVICE: CPT | Mod: S$GLB,,, | Performed by: NURSE PRACTITIONER

## 2022-02-01 PROCEDURE — 3079F PR MOST RECENT DIASTOLIC BLOOD PRESSURE 80-89 MM HG: ICD-10-PCS | Mod: HCNC,CPTII,S$GLB, | Performed by: NURSE PRACTITIONER

## 2022-02-01 PROCEDURE — 3074F SYST BP LT 130 MM HG: CPT | Mod: HCNC,CPTII,S$GLB, | Performed by: NURSE PRACTITIONER

## 2022-02-01 PROCEDURE — 1126F PR PAIN SEVERITY QUANTIFIED, NO PAIN PRESENT: ICD-10-PCS | Mod: HCNC,CPTII,S$GLB, | Performed by: NURSE PRACTITIONER

## 2022-02-01 PROCEDURE — G0439 PR MEDICARE ANNUAL WELLNESS SUBSEQUENT VISIT: ICD-10-PCS | Mod: HCNC,S$GLB,, | Performed by: NURSE PRACTITIONER

## 2022-02-01 PROCEDURE — 1101F PR PT FALLS ASSESS DOC 0-1 FALLS W/OUT INJ PAST YR: ICD-10-PCS | Mod: HCNC,CPTII,S$GLB, | Performed by: NURSE PRACTITIONER

## 2022-02-01 PROCEDURE — 99499 RISK ADDL DX/OHS AUDIT: ICD-10-PCS | Mod: S$GLB,,, | Performed by: NURSE PRACTITIONER

## 2022-02-01 PROCEDURE — 3288F FALL RISK ASSESSMENT DOCD: CPT | Mod: HCNC,CPTII,S$GLB, | Performed by: NURSE PRACTITIONER

## 2022-02-01 PROCEDURE — 99999 PR PBB SHADOW E&M-EST. PATIENT-LVL IV: ICD-10-PCS | Mod: PBBFAC,HCNC,, | Performed by: NURSE PRACTITIONER

## 2022-02-01 PROCEDURE — 99999 PR PBB SHADOW E&M-EST. PATIENT-LVL IV: CPT | Mod: PBBFAC,HCNC,, | Performed by: NURSE PRACTITIONER

## 2022-02-01 PROCEDURE — 1101F PT FALLS ASSESS-DOCD LE1/YR: CPT | Mod: HCNC,CPTII,S$GLB, | Performed by: NURSE PRACTITIONER

## 2022-02-01 PROCEDURE — 1159F MED LIST DOCD IN RCRD: CPT | Mod: HCNC,CPTII,S$GLB, | Performed by: NURSE PRACTITIONER

## 2022-02-01 PROCEDURE — 1170F FXNL STATUS ASSESSED: CPT | Mod: HCNC,CPTII,S$GLB, | Performed by: NURSE PRACTITIONER

## 2022-02-01 PROCEDURE — 1170F PR FUNCTIONAL STATUS ASSESSED: ICD-10-PCS | Mod: HCNC,CPTII,S$GLB, | Performed by: NURSE PRACTITIONER

## 2022-02-01 RX ORDER — HEPARIN 100 UNIT/ML
SYRINGE INTRAVENOUS
COMMUNITY
Start: 2021-12-01 | End: 2022-10-17

## 2022-02-01 RX ORDER — CARBOXYMETHYLCELLULOSE SODIUM 5 MG/ML
1 SOLUTION/ DROPS OPHTHALMIC 3 TIMES DAILY PRN
COMMUNITY

## 2022-02-01 RX ORDER — PEMBROLIZUMAB 25 MG/ML
INJECTION, SOLUTION INTRAVENOUS
COMMUNITY
Start: 2021-12-01 | End: 2022-10-17

## 2022-02-01 NOTE — PROGRESS NOTES
"  Ade Castellano presented for a  Medicare AWV and comprehensive Health Risk Assessment today. The following components were reviewed and updated:    · Medical history  · Family History  · Social history  · Allergies and Current Medications  · Health Risk Assessment  · Health Maintenance  · Care Team         ** See Completed Assessments for Annual Wellness Visit within the encounter summary.**         The following assessments were completed:  · Living Situation  · CAGE  · Depression Screening  · Timed Get Up and Go  · Whisper Test  · Cognitive Function Screening  · Nutrition Screening  · ADL Screening  · PAQ Screening        Vitals:    02/01/22 1036   BP: 118/80   Pulse: (!) 55   Resp: 16   Temp: 97.4 °F (36.3 °C)   TempSrc: Oral   SpO2: 95%   Weight: 76.9 kg (169 lb 8.5 oz)   Height: 5' 2.25" (1.581 m)     Body mass index is 30.76 kg/m².  Physical Exam  Vitals and nursing note reviewed.   Constitutional:       General: She is not in acute distress.     Appearance: Normal appearance. She is well-developed, well-groomed and overweight. She is not ill-appearing.   HENT:      Head: Normocephalic and atraumatic.      Right Ear: External ear normal.      Left Ear: External ear normal.      Nose: Nose normal.      Mouth/Throat:      Lips: Pink.      Mouth: Mucous membranes are moist.   Eyes:      General: Lids are normal. No scleral icterus.        Right eye: No discharge.         Left eye: No discharge.      Conjunctiva/sclera: Conjunctivae normal.   Neck:      Trachea: Phonation normal.   Pulmonary:      Effort: Pulmonary effort is normal. No accessory muscle usage or respiratory distress.   Abdominal:      General: Abdomen is flat. There is no distension.   Musculoskeletal:      Cervical back: Neck supple.   Skin:     General: Skin is warm and dry.      Findings: No rash.   Neurological:      General: No focal deficit present.      Mental Status: She is alert and oriented to person, place, and time. Mental status is " at baseline.      Motor: No abnormal muscle tone.      Gait: Gait normal.   Psychiatric:         Attention and Perception: Attention and perception normal.         Mood and Affect: Mood and affect normal.         Speech: Speech normal.         Behavior: Behavior normal. Behavior is cooperative.         Thought Content: Thought content normal.         Cognition and Memory: Cognition and memory normal.         Judgment: Judgment normal.                   Diagnoses and health risks identified today and associated recommendations/orders:    1. Encounter for preventive health examination  Health maintenance reviewed, preventative care ordered, follow up with PCP for routine care    2. Dependence on supplemental oxygen  2/2 COPD with lung Ca, f/u with pulmonology    3. Chronic heart failure with preserved ejection fraction  EF 60%, angina stable on IMDUR, BP controlled, followed by cards    4. Coronary artery disease of native artery of native heart with stable angina pectoris  Angina stable on IMDUR, on ASA and statin daily, followed by cards    5. Primary hypertension  BP controlled on current regimen, followed by cards    6. Hyperlipidemia LDL goal <70  Stable on statin, followed by cards    7. Atherosclerosis of aorta  On statin, BP controlled, followed by cards    8. Stage 3 severe COPD by GOLD classification  Stable, use home oxygen at night, followed closely by pulm    9. Squamous cell carcinoma of lung, unspecified laterality  On keytruda 3 times per week, managed by hem/onc    10. Metastatic breast cancer  On keytruda 3 times per week, managed by hem/onc    11. Malignant neoplasm of overlapping sites of lung, unspecified laterality  On keytruda 3 times per week, managed by hem/onc    12. Secondary malignant neoplasm of unspecified site (CODE)  On keytruda 3 times per week, managed by hem/onc    13. Prediabetes  Fasting hyperglycemia with 6.1 HgA1c, diet and exercise controlled    14. DNR (do not  resuscitate)  Discussed with pt, no change in status     15. Patient on antineoplastic chemotherapy regimen  - DXA Bone Density Spine And Hip; Future    16. Colon cancer screening  - Case Request Endoscopy: COLONOSCOPY      Provided Ade with a 5-10 year written screening schedule and personal prevention plan. Recommendations were developed using the USPSTF age appropriate recommendations. Education, counseling, and referrals were provided as needed. After Visit Summary printed and given to patient which includes a list of additional screenings\tests needed.    Follow up in about 1 year (around 2/1/2023).    Sofía Connell NP  I offered to discuss advanced care planning, including how to pick a person who would make decisions for you if you were unable to make them for yourself, called a health care power of , and what kind of decisions you might make such as use of life sustaining treatments such as ventilators and tube feeding when faced with a life limiting illness recorded on a living will that they will need to know. (How you want to be cared for as you near the end of your natural life)     X  Patient has advanced directives on file, which we reviewed, and they do not wish to make changes.

## 2022-02-01 NOTE — PATIENT INSTRUCTIONS
Counseling and Referral of Other Preventative  (Italic type indicates deductible and co-insurance are waived)    Patient Name: Ade Castellano  Today's Date: 2/1/2022    Health Maintenance       Date Due Completion Date    TETANUS VACCINE Never done ---    DEXA SCAN 03/08/2020 3/8/2017    Colorectal Cancer Screening 01/29/2022 1/29/2019    Shingles Vaccine (1 of 2) 04/05/2022 (Originally 10/8/1996) ---    High Dose Statin 02/01/2023 2/1/2022    Lipid Panel 09/18/2025 9/18/2020        No orders of the defined types were placed in this encounter.    The following information is provided to all patients.  This information is to help you find resources for any of the problems found today that may be affecting your health:                Living healthy guide: www.[facility].[place].gov      Understanding Diabetes: www.diabetes.org      Eating healthy: www.cdc.gov/healthyweight      CDC home safety checklist: www.cdc.gov/steadi/patient.html      Agency on Aging: www.goea.[place].gov      Alcoholics anonymous (AA): www.aa.org      Physical Activity: www.anca.nih.gov/vc8ofig      Tobacco use: www.quitwithusla.org

## 2022-02-01 NOTE — TELEPHONE ENCOUNTER
No new care gaps identified.  Powered by [employer]. Reference number: [id number].   2/01/2022 12:53:41 PM CST

## 2022-02-02 ENCOUNTER — INFUSION (OUTPATIENT)
Dept: INFUSION THERAPY | Facility: HOSPITAL | Age: 77
End: 2022-02-02
Attending: INTERNAL MEDICINE
Payer: MEDICARE

## 2022-02-02 DIAGNOSIS — C50.919 RECURRENT BREAST CANCER, UNSPECIFIED LATERALITY: Primary | ICD-10-CM

## 2022-02-02 DIAGNOSIS — C50.111 MALIGNANT NEOPLASM OF CENTRAL PORTION OF RIGHT BREAST IN FEMALE, ESTROGEN RECEPTOR NEGATIVE: ICD-10-CM

## 2022-02-02 DIAGNOSIS — E03.2 HYPOTHYROIDISM DUE TO DRUGS: ICD-10-CM

## 2022-02-02 DIAGNOSIS — C34.91 SQUAMOUS CELL CARCINOMA OF RIGHT LUNG: ICD-10-CM

## 2022-02-02 DIAGNOSIS — C50.919 METASTATIC BREAST CANCER: ICD-10-CM

## 2022-02-02 DIAGNOSIS — Z17.1 MALIGNANT NEOPLASM OF CENTRAL PORTION OF RIGHT BREAST IN FEMALE, ESTROGEN RECEPTOR NEGATIVE: ICD-10-CM

## 2022-02-02 LAB
ALBUMIN SERPL BCP-MCNC: 4 G/DL (ref 3.5–5.2)
ALP SERPL-CCNC: 53 U/L (ref 55–135)
ALT SERPL W/O P-5'-P-CCNC: 12 U/L (ref 10–44)
ANION GAP SERPL CALC-SCNC: 9 MMOL/L (ref 8–16)
AST SERPL-CCNC: 17 U/L (ref 10–40)
BASOPHILS # BLD AUTO: 0.05 K/UL (ref 0–0.2)
BASOPHILS NFR BLD: 1.3 % (ref 0–1.9)
BILIRUB SERPL-MCNC: 0.5 MG/DL (ref 0.1–1)
BUN SERPL-MCNC: 13 MG/DL (ref 8–23)
CALCIUM SERPL-MCNC: 9.2 MG/DL (ref 8.7–10.5)
CHLORIDE SERPL-SCNC: 102 MMOL/L (ref 95–110)
CO2 SERPL-SCNC: 29 MMOL/L (ref 23–29)
CREAT SERPL-MCNC: 0.7 MG/DL (ref 0.5–1.4)
DIFFERENTIAL METHOD: NORMAL
EOSINOPHIL # BLD AUTO: 0.1 K/UL (ref 0–0.5)
EOSINOPHIL NFR BLD: 2.3 % (ref 0–8)
ERYTHROCYTE [DISTWIDTH] IN BLOOD BY AUTOMATED COUNT: 13.6 % (ref 11.5–14.5)
EST. GFR  (AFRICAN AMERICAN): >60 ML/MIN/1.73 M^2
EST. GFR  (NON AFRICAN AMERICAN): >60 ML/MIN/1.73 M^2
GLUCOSE SERPL-MCNC: 125 MG/DL (ref 70–110)
HCT VFR BLD AUTO: 41 % (ref 37–48.5)
HGB BLD-MCNC: 13.6 G/DL (ref 12–16)
IMM GRANULOCYTES # BLD AUTO: 0.01 K/UL (ref 0–0.04)
IMM GRANULOCYTES NFR BLD AUTO: 0.3 % (ref 0–0.5)
LYMPHOCYTES # BLD AUTO: 1.1 K/UL (ref 1–4.8)
LYMPHOCYTES NFR BLD: 26.6 % (ref 18–48)
MCH RBC QN AUTO: 30.7 PG (ref 27–31)
MCHC RBC AUTO-ENTMCNC: 33.2 G/DL (ref 32–36)
MCV RBC AUTO: 93 FL (ref 82–98)
MONOCYTES # BLD AUTO: 0.4 K/UL (ref 0.3–1)
MONOCYTES NFR BLD: 10 % (ref 4–15)
NEUTROPHILS # BLD AUTO: 2.4 K/UL (ref 1.8–7.7)
NEUTROPHILS NFR BLD: 59.5 % (ref 38–73)
NRBC BLD-RTO: 0 /100 WBC
PLATELET # BLD AUTO: 178 K/UL (ref 150–450)
PMV BLD AUTO: 11.1 FL (ref 9.2–12.9)
POTASSIUM SERPL-SCNC: 3.3 MMOL/L (ref 3.5–5.1)
PROT SERPL-MCNC: 6.8 G/DL (ref 6–8.4)
RBC # BLD AUTO: 4.43 M/UL (ref 4–5.4)
SODIUM SERPL-SCNC: 140 MMOL/L (ref 136–145)
TSH SERPL DL<=0.005 MIU/L-ACNC: 2.14 UIU/ML (ref 0.4–4)
WBC # BLD AUTO: 3.99 K/UL (ref 3.9–12.7)

## 2022-02-02 PROCEDURE — 63600175 PHARM REV CODE 636 W HCPCS: Mod: HCNC | Performed by: INTERNAL MEDICINE

## 2022-02-02 PROCEDURE — 25000003 PHARM REV CODE 250: Mod: HCNC | Performed by: INTERNAL MEDICINE

## 2022-02-02 PROCEDURE — A4216 STERILE WATER/SALINE, 10 ML: HCPCS | Mod: HCNC | Performed by: INTERNAL MEDICINE

## 2022-02-02 PROCEDURE — 96413 CHEMO IV INFUSION 1 HR: CPT | Mod: HCNC

## 2022-02-02 PROCEDURE — 80053 COMPREHEN METABOLIC PANEL: CPT | Mod: HCNC | Performed by: INTERNAL MEDICINE

## 2022-02-02 PROCEDURE — 85025 COMPLETE CBC W/AUTO DIFF WBC: CPT | Mod: HCNC | Performed by: INTERNAL MEDICINE

## 2022-02-02 PROCEDURE — 84443 ASSAY THYROID STIM HORMONE: CPT | Mod: HCNC | Performed by: INTERNAL MEDICINE

## 2022-02-02 PROCEDURE — 36591 DRAW BLOOD OFF VENOUS DEVICE: CPT | Mod: HCNC

## 2022-02-02 RX ORDER — ROSUVASTATIN CALCIUM 10 MG/1
TABLET, COATED ORAL
Qty: 30 TABLET | Refills: 11 | Status: SHIPPED | OUTPATIENT
Start: 2022-02-02 | End: 2022-12-22 | Stop reason: SDUPTHER

## 2022-02-02 RX ORDER — HEPARIN 100 UNIT/ML
500 SYRINGE INTRAVENOUS
Status: CANCELLED | OUTPATIENT
Start: 2022-02-02

## 2022-02-02 RX ORDER — SODIUM CHLORIDE 0.9 % (FLUSH) 0.9 %
10 SYRINGE (ML) INJECTION
Status: CANCELLED | OUTPATIENT
Start: 2022-02-02

## 2022-02-02 RX ORDER — HEPARIN 100 UNIT/ML
500 SYRINGE INTRAVENOUS
Status: DISCONTINUED | OUTPATIENT
Start: 2022-02-02 | End: 2022-02-02 | Stop reason: HOSPADM

## 2022-02-02 RX ORDER — SODIUM CHLORIDE 0.9 % (FLUSH) 0.9 %
10 SYRINGE (ML) INJECTION
Status: DISCONTINUED | OUTPATIENT
Start: 2022-02-02 | End: 2022-02-02 | Stop reason: HOSPADM

## 2022-02-02 RX ADMIN — SODIUM CHLORIDE 200 MG: 9 INJECTION, SOLUTION INTRAVENOUS at 10:02

## 2022-02-02 RX ADMIN — SODIUM CHLORIDE, PRESERVATIVE FREE 10 ML: 5 INJECTION INTRAVENOUS at 10:02

## 2022-02-02 RX ADMIN — HEPARIN 500 UNITS: 100 SYRINGE at 10:02

## 2022-02-02 NOTE — PLAN OF CARE
Pt presents to unit for stat labs + C10 of Keytruda. STAT CBC, CMP, and TSH drawn. Pt still endorses generalized fatigue with an intermittent headache. Reports she has been having issues with hypertension. Had her wellness visit yesterday. VSS today. Also reports her right arm has still been hurting. Had an MRI that showed a torn ligament and inflammation. Taking OTC Tylenol as needed. Labs reviewed and pt cleared for tx today. Keytruda given. Tolerated well. Pt given discharge instructions along with next appointments. Left unit in NAD at time of discharge.

## 2022-02-22 NOTE — PROGRESS NOTES
Subjective:       Patient ID: Ade Castellano is a 76 y.o. female.    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 The PET(+) disease in the right axilla and right supraclavicular fossa will be boosted at 200 cGy per fraction to 6000 cGy total dose.  S/p LYMPH NODE, RIGHT AXILLA, BIOPSY: 6/16/21 . Metastatic poorly-differentiated carcinoma, c/w breast primary ERnegPRneg Her2 neg  PD-L1 (22C3) IHC CPS> is greater than or equal to 10  Pembrolizumab 7/22/21 -present        Prior Hx:  75 y/o female with history of  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  And Rt  breast Haja/p  cycle 6 of carboplatin/Abraxane 7/2/2018   She has  History of Stage 1A  Rt breast cancer Grade 3, ER/TN neg , Her 2 jose maria neg s/p lumpectomy 7/11/2014 and s/p adjuvant RT 9/2014 Pt declined Adjuvant chemo.Pathology showed a 1.15 cm, grade 3 infiltrating ductal carcinoma.  One sentinel lymph node was negative for malignancy.  Margins were negative and the closest margin was 1 cm.  She was staged  pT1c  pN0 cM0 stage IA.  She declined adjuvant chemo therapy.  She completed post operative radiation therapy at 400 cGy per fraction to 4000 cGy 9/2014  Pt hospitalized 11/2017 for  acute on chronic respiratory failure requiring intubation and ventilation. Has large lung mass in right lung with probable post obstructive pneumonia resulting in COPD exacerbation.CTA  chest 11/29/2017 revealed   Large right hilar mass with involvement of adjacent segmental pulmonary arterial branches and bronchi with associated postobstructive atelectasis and volume loss in the RML  with adjacent ground glass opacity concerning for underlying neoplastic process. Mediastinal and axillary adenopathy, with a right axillary lymph node measuring up to 2.0 cm in short axis diameter.She underwent rt axillary LN bx at outside facility- on 1/16/2018 at [facility]. Pathology revealed metastatic poorly differentiated carcinoma of unknown primary site. She next underwent lung bx at [facility] 1/31/2018  benign lung tissue. Repeat Right Lung Bx 2/14/2018 Pathology reveals Squamous cell carcinoma PD-L1 10% low expression EGFR NEG ALK NEG BRAF NEG. Outside slides axillary LN specimen were reviewed/comparison to lung bx findings . She completed    cycle 6 of carboplatin/Abraxane completed 7/2018 .PET/CT  4/19/2018 revealed there has been a excellent response to therapy.  At least 90% reduction in malignant activity.PET/CT 2/21/2019 increased size and irregularity of the right axillary lymph node with increased FDG avidityMAMMO/US rt breast 2/2019 revealed suspicious findings rt axilla LN.  Right axilla, mass, core biopsy: Positive for poorly differentiated carcinoma breast primary ER neg/PA neg Her2 neg.[facility] reviewIt was determined  the lung tumor corresponds to a squamous cellcarcinoma and appears to be unrelated to the invasive ductal carcinoma of the breast. The axillary mass, in myopinion, corresponds to metastases of the breast primary. Although it is a poorly differentiated carcinoma, theimmunophenotype is most consistent with the one that the breast primary exhibited, and is inconsistent with metastatic squamous cell carcinoma. She was treated with  AC ( adriamycin 60m/m2/Cytoxan 600mg/m2)  q21d x 4 cycles completed 9/6/2019 . PET/CT 9/19/2019 shows disease response l decrease in  size and uptake of several right axillary lymph nodes and a supraclavicular lymph node.  Mild residual activity may indicate viable tumor.Pt followed by Rad/Onc. She was presented at [facility] tumor board and it was determined to proceed Radiation therapy only. No ALND due to concurrent lung and supraclavicular node. She  completed  RT 12/16/2019  To right axilla and right supraclavicular region PET/CT imaging  5/20/21 shows Continued increase in both size and hypermetabolic activity of right axillary level 1 lymph nodes a new, mildly hypermetabolic cutaneous thickening of the right breast. she underwent LYMPH NODE, RIGHT AXILLA, BIOPSY: 6/16/21 . Pathology showed Metastatic poorly-differentiated carcinoma, consistent with breast primary-ERneg/ PRneg  HER2  neg          Interval Hx: Pt started on pembrolizumab  S/p C10 completed  2/22/22   She reports pain Rt shoulder/RUE improved with OTC acetaminophen arthritis  Prescribed tramadol not working  She reports difficulty with ROM 2/2 pain  MRI shoulder w w/out contrast 1/26/22  Mild edema and postcontrast enhancement at the myotendinous junction of the supraspinatus and infraspinatus, it is nonspecific and could be due to degenerative change or tendinosis.Small area of interstitial tear at the footprint insertion of the infraspinatus tendon.   No large rotator cuff tear.      Appetite and weight stable  Mild  Fatigue chronic, stable  She continues with SOB w/exertion-stable  Continues with intermittent chronic cough  She is followed by Dr. Katz, pulmonology  She underwent extensive workup and it was determined that worsening cough/SOB likely from radiation/COPD and allergic rhinits  She also has been followed by GI for dysphagia . She has undergone dilation      Last Mammo 6/16/21  No mammographic evidence of malignancy.BI-RADS Category 1: Negative      PET/CT 1/26/22 No definite evidence of hypermetabolic tumor.  Interval resolution of hypermetabolic right  axillary lymph nodes.  No new hypermetabolic findings.        PrevHx:She had an abnormal mammogram 6/4/2014 whichRevealed a round solid mass 6mm in rt breast.She then underwent U/S guided core bx of rt breast mass on 6/17/2014.Pathology revealed infiltrating ductal carcinoma Grade 3 with tumorPresent in thin-walled spaces suggestive of lymphatic spaces. HormoneReceptor status on tumor specimen revealed ER negative 0% ,VA negative 0% and Her 2 jose maria negative.She subsequently underwent rt segmental mastectomy and SLN bxOn 7/11/2014. Pathology of rt breast lumpectomy revealed invasiveDuctal carcinoma with micropapillary pattern ( Invasive micropapillaryCa) with max tumor dimension 11.5mm with suggestion of tumor in Thin walled spaces c/w lymphovascular involvement. SurgicalMargins free of tumor, grade 3, ER/VA neg , Her 2 jose maria neg With [place] Lymph node negative for Neoplasm. Pathologic staging [id number](i-)Her mother was diagnosed with breast cancer in her 50's/        Review of Systems   Constitutional: Positive for fatigue. Negative for appetite change, fever and unexpected weight change.   HENT: Negative for mouth sores and rhinorrhea.    Eyes: Negative for visual disturbance.   Respiratory: Positive for cough and shortness of breath (LOGAN, chronic, stable). Negative for wheezing.    Cardiovascular: Negative for chest pain.   Gastrointestinal: Negative for abdominal pain and diarrhea.   Genitourinary: Negative for frequency.   Musculoskeletal: Positive for arthralgias. Negative for back pain.   Skin: Negative for rash.   Neurological: Negative for dizziness and headaches.   Hematological: Negative for adenopathy.   Psychiatric/Behavioral: The patient is not nervous/anxious.        Objective:          Vitals:    02/23/22 0808   BP: (!) 141/66   BP Location: Left arm   Patient Position: Sitting   BP Method: Large (Automatic)   Pulse: 60   Temp: 98 °F (36.7 °C)   TempSrc: Oral   SpO2: 97%   Weight: 74 kg (163 lb 2.3 oz)  "  Height: 5' 3" (1.6 m)     '    Physical Exam   Constitutional: She is oriented to person, place, and time. She appears well-developed and well-nourished.   HENT:   Head: Normocephalic.   Mouth/Throat: Oropharynx is clear and moist. No oropharyngeal exudate.   Eyes: Conjunctivae and lids are normal. Pupils are equal, round, and reactive to light. No scleral icterus.   Neck: Normal range of motion. Neck supple. No thyromegaly present.   Cardiovascular: Normal rate, regular rhythm and normal heart sounds.    No murmur heard.  Pulmonary/Chest: Breath sounds normal. She has no wheezes. She has no rales.   Abdominal: Soft. Bowel sounds are normal. She exhibits no distension and no mass. There is no hepatosplenomegaly. There is no tenderness. There is no rebound and no guarding.   Musculoskeletal: Limited  range of motion 2/2 pain She exhibits TTP rt shoulder   Left breast- no masses or axillary LAD noted  Right breast- breast thickening inner quad    She has no cervical adenopathy.     She has rt axillary adenopathy.        Right: No supraclavicular adenopathy present.        Left: No supraclavicular adenopathy present.   Neurological: She is alert and oriented to person, place, and time. No cranial nerve deficit. Coordination normal.   Skin: Skin is warm and dry. No ecchymosis, no petechiae and no rash noted. No erythema.   Psychiatric: She has a normal mood and affect.             CT a/p w/contrast 11/29/2018   1. No acute abnormality identified within the abdomen and pelvis.    2.  There are a few nonspecific prominent lymph nodes in the upper abdomen including a periesophageal lymph node which measures 1.0 cm in short axis diameter.    3.  Significant abdominal aortic atherosclerosis and abdominal aorta ectasia.    4.  Additional findings as above.    PET/CT 1/26/2018   1.  Intense FDG uptake within the known right infrahilar lung mass, compatible with malignancy.  It is unclear if this represents primary lung " malignancy or metastatic breast cancer.    2.  Intensely hypermetabolic right axillary, retropectoral, and lower right cervical lymph nodes, compatible with metastases.    3.  Abnormal FDG uptake along right breast skin thickening, new from prior chest CTA and mammogram.  This may relate to localized edema/inflammation, though correlation with physical exam and mammography are recommended to exclude underlying inflammatory carcinoma.      MRI Brain w w/out contrast 2/9/2018  1.  No evidence of intracranial metastases.  2.  Sinus disease       Rt axillary LN bx at outside facility- on 1/16/2018 at [facility]  Pathology revealed metastatic poorly differentiated carcinoma of unknown primary  site 1/16/2018 at [facility]  TTF-1 negative, napsin negative  , cytokeratin 7 positive, cytokeratin 20 negative, p63 negative, cytokeratin 5/6 focal dim positivity    LUNG BIOPSY 1/31/2018  Pathology revealed benign lung tissue         SPECIMEN  1) Right Lung Bx 2/14/2018  Supplemental Diagnosis  Immunohistochemical stains show strong nuclear staining for p63 in essentially all tumor cells and very strong  cytoplasmic and membrane staining for CK5/6, also in essentially all tumor cells. TTF-1 and CK7 are negative  within tumor cells but do stain native pulmonary elements present within the biopsy. A stain for mucicarmine is  negative. Positive and negative controls function appropriately.  Final diagnosis: Specimen submitted as right lung biopsy  -Squamous cell carcinoma  (Electronically Signed: 2018-02-20 09:12:07 )  Diagnosed by: Phong Thompson  FINAL PATHOLOGIC DIAGNOSIS  Fragments of pulmonary parenchyma (submitted as right lung biopsy):    FINAL PATHOLOGIC DIAGNOSIS 1. Lymph node, right axilla, biopsy, review of 10 outside slides [facility], JS 18 [phone],  collected January 11, 2018: Metastatic poorly differentiated carcinoma (see comment).  The histologic section shows fibrous  stroma infiltrated by nest of poorly differentiated malignant cells including  occasional dyskeratotic cells morphologically suggestive of metastatic squamous cell carcinoma.  Immunohistochemical stains are nonspecific; the cells are positive for cytokeratin 7 and cytokeratin 5/6 (focal) and  negative for cytokeratin 20, TTF-1, p63, GCDFP, mammoglobin, and CEA        PET/CT 2/21/2019  Increased size and irregularity of the right axillary lymph node with increased FDG avidity.  Although this would be atypical in location for lung carcinoma, the increased size and avidity must be concerning for metastatic disease in this patient with history of squamous cell lung cancer.     US Rt breast and mammo 2/26/2019  Impression:  Right  Lymph Node: Right axilla lymph node. Assessment: 4 - Suspicious finding. Biopsy is recommended.      BI-RADS Category:   Right: 4 - Suspicious  Overall: 4 - Suspicious     Recommendation:  Biopsy is recommended. Biopsy of the lymph node measuring 1.4 x 1.1 x 1.3 recommended , the one with the round shape configuration, corresponding to the most recent PET CT finding.         Pathology 3/11/2019   FINAL PATHOLOGIC DIAGNOSIS  Right axilla, mass, core biopsy:  Positive for poorly differentiated carcinoma.  See comment.  Comment:  The biopsy from the right axilla mass shows a poorly differentiated carcinoma in background lymphoid tissue  with enlarged cells showing increased nuclear size, prominent nucleoli, moderate amounts of cytoplasm and mitotic  figures. Immunostains are completed and reveal the tumor cells to stain positively with cytokeratin AE 1/AE3, CK  5/6 and CK 7. The tumor cells are negative for CK 20, Estrogen receptor, p63 and TTF-1. All stains have  satisfactory positive and negative controls. The patient's prior cases will be reviewed and an additional stain for  JUAREZ-3 is pending in an attempt to pinpoint the primary site of this malignancy and results will follow in  a  supplemental report.      Supplemental Diagnosis  3/11/2019  The current axillary mass is compared to the patient's prior right lung biopsy (case number [id number]) and the  current axillary mass is morphologically similar to the lung malignancy. Also, the current axillary mass is compared  to the patient's prior breast resection (Case number [id number]) and appears similar as well. P63 is negative in the  [id number] tumor and CK 5/6 shows scattered positive staining in the [id number] tumor.  Immunostain for JUAREZ-3 is completed and shows only rare weak to moderate staining within the tumor cell nuclei  with satisfactory positive and negative controls. This stain is positive in the surrounding lymphocytes. This staining  pattern is non-specific and does not definitively differentiate between a lung and breast primary malignancy in this  right axilla mass biopsy. The staining profile of the current axillary mass match more closely with the previous  breast carcinoma (due to the p63 negativity in both the 2014 breast cancer and the current axillary mass lesion but  p63 positivity in the lung mass from 2018).  This staining profile, together with the comparison with the patient's prior breast tumor and prior lung tumor supports  a diagnosis of poorly differentiated carcinoma and the malignancy appears morphologically similar to the prior  malignancies from both sites, but the staining profile in this small sample from the axillary mass more closely  correlates with the prior breast malignancy. Pathologic subclassification of this malignancy's primary location is not  definitive and clinical correlation is recommended definitively decide on possible primary location in this patient's  axillary mass sample.  Supplemental (2):  Additional immunohistochemical staining for progesterone receptor and HER2 are completed per clinician request  with following results:  Progesterone receptor: Negative; 0% nuclear tumor cell  staining.  HER2: Negative; stain score = 0.  Supplemental (3):  [place] DIAGNOSIS:  FINAL DIAGNOSIS  Breast, right, needle core biopsy ([id number]; 06/17/2014): Invasive ductal carcinoma, [place] grade III (of  III), with focal micropapillary features.  Immunohistochemical stains performed at the referring institution show that the tumor cells have the following  phenotype: - Estrogen receptor: Negative (0% tumor cells staining). - Progesterone receptor: Negative (0% tumor  cells staining). - HER2: Negative (score 0).  Lymph nodes, right, sentinel biopsy and lumpectomy ([id number]; 07/11/2014):  1. Right sentinel lymph node: A single (1) lymph node is negative for metastatic carcinoma.  Immunohistochemical stains performed by the referring institution and reviewed at [facility] for cytokeratin are  negative, confirming the diagnosis.  2. Right breast: Invasive ductal carcinoma with micropapillary features, per report 11.5 mm in greatest dimension.  Foci suspicious for lymphovascular invasion identified. Margins of resection are free of tumor.  Immunohistochemical stains performed by the referring institution and reviewed at [facility] show that the tumor  cells are negative for p63 and show patchy positivity for CK 5/6.  Lung, right, needle core biopsy ([id number]; 02/14/2018): Squamous cell carcinoma.    Immunohistochemical stains performed by the referring institution show the tumor cells are strongly positive for p63  and CK 5/6, while negative for TTF-1 and CK7. These result support the diagnosis. Axilla, right, needle core biopsy  ([id number]; 03/11/2019): Lymph node positive for metastatic carcinoma, most consistent with breast primary  (see comment).  Immunohistochemical stains performed by the referring institution and reviewed at [facility] show that the tumor  cells are negative for p63, focally positive for CK 5/6, focally and weakly positive for JUAREZ-3, and strongly positive  for CK7. They are also  negative for estrogen receptor, progesterone receptor, and HER2 JAM (score 0).  COMMENT:  Thank you for allowing me to review the case of this 73-year-old lady who recently underwent needle core biopsies  of a right axillary mass and who has a previous diagnosis of an invasive ductal carcinoma of the right breast, as well  as a squamous cell carcinoma of the lung. I am reviewing this case because of my special interest in breast  pathology.  I agree with your interpretation of this case. In my opinion, the lung tumor corresponds to a squamous cell  carcinoma and appears to be unrelated to the invasive ductal carcinoma of the breast. The axillary mass, in my  opinion, corresponds to metastases of the breast primary. Although it is a poorly differentiated carcinoma, the  immunophenotype is most consistent with the one that the breast primary exhibited, and is inconsistent with a  metastatic squamous cell carcinoma. I did share the case with Dr. Anabel Stone, one of our pulmonary pathologists,  and she concurs with this interpretation.  Note: Report attached.  Performing location:  [place] [address]      LYMPH NODE, RIGHT AXILLA, BIOPSY: 6/16/21   - Metastatic poorly-differentiated carcinoma, consistent with breast primary  -ER, CA, and HER2 immunohistochemical hormonal markers are also negative  PD-L1 (22C3) SemiQuant IHC, Manual  Interpretation  Right axillary lymph node , specimen for PD-L1 immunohistochemistry studies (clone 22C3, [employer], [place], [place]; using a proprietary detection system) ([id number]):  Reported carcinoma site of origin: Breast  Result: The percent of PD-L1 positive cells based upon the total number of tumor cells (combined positive  score, CPS) is greater than or equal to 10.  Interpretation: Studies suggest that positive PD-L1 immunohistochemistry in tumor cells and/or tumorassociated  immune cells may  predict tumor response to therapy with immune checkpoint inhibitors. This  result should not be used as the sole factor in determining treatment, as other factors (for example, tumor  mutation burden, and microsatellite instability) have been also studied as predictive markers.          CT neck/chest/abd/pelvis w/contrast 4/26/2019   The previously described right hilar mass is no longer visible.  There is persistent volume loss in the right middle lobe this appears similar to the prior PET-CT February 21, 2019.    Persistent abnormal right axillary node today measuring about 15 mm compared 18 mm prior.  The positioning of the adjacent nodes is slightly different likely accounting for the slight difference in measurement.    Cluster of tree-in-bud nodular densities left lower lobe series 2, image 93.  This may be related to small airways disease though serial follow-up suggested.      PET/CT 6/20/2019   New and worsening hypermetabolic lymph nodes concerning for metastatic breast cancer as described above.  Tissue sampling would be required for definitive diagnosis.      2-D echo 6/27/2019   · Normal left ventricular systolic function. The estimated ejection fraction is 55%  · Concentric left ventricular remodeling.  · Normal LV diastolic function.  · Normal right ventricular systolic function.  · Moderate left atrial enlargement.  · Mild right atrial enlargement.  · Mild aortic regurgitation.  · Mild mitral regurgitation.  · Mild tricuspid regurgitation.  · Normal central venous pressure (3 mm Hg).  · The estimated PA systolic pressure is 25 mm Hg      PET/CT 9/19/2019   Favorable response to therapy with interval decrease in size and uptake of several right axillary lymph nodes and a supraclavicular lymph node.  Mild residual activity may indicate viable tumor.    No new hypermetabolic findings worrisome for malignancy.    PET/CT 2/28/2020  1.  No evidence of hypermetabolic tumor.  Continued improvement of the  right axilla status post adjuvant radiation.    2.  No new hypermetabolic lesions      CTA 6/17/2020  1. No evidence of pulmonary embolus. No aortic dissection.   2. There is no evidence for new or progressive disease in the chest as compared to PET/CT 6/20/2019 in this patient with history of right breast cancer and right lung neoplasm. The patient does have a more recent PET/CT 2/28/2020 from an outside facility per the electronic medical record although images are not available for direct comparison. Correlation with these images may be beneficial. Continued long-term surveillance recommended.  3. Stable appearance of the treated tumor in the right hilum/middle lobe.  4. Stable treated right breast cancer and axillary adenopathy without evidence for developing breast mass or new right axillary adenopathy.  5. Stable tiny nodularity/tree-in-bud densities in the left lung, probably postinfectious or inflammatory.  6. Wall thickening of the esophagus may be correlated for esophagitis. Possible tiny hiatus hernia.  7. Slight bronchial wall thickening may be correlated for bronchitis.  8. Mild to moderate emphysema as before.  9. Reflux of contrast into the IVC and hepatic veins is nonspecific but may be correlated for elevated right heart pressures.  10. Coronary calcifications and/or stents similar to prior.    Echo 5/21/2020  Technically difficult study.   Normal left ventricular systolic function.   Left ventricular ejection fraction is estimated at 55%.   Severe mitral annular calcification.   Mild mitral valve regurgitation.   Aortic valve sclerosis without stenosis or regurgitation.     PFTs 6/17/2020  Spirometry reveals a very severe obstructive lung defect, which does not significantly improve post bronchodilators. Lung volumes revealed air trapping. Diffusing capacity was moderately impaired.        Del 6/26/2020  BI-RADS Category:   Overall: 2 - Benign      PET/CT 10/23/2020   Impression:     Stable mildly  hypermetabolic right axillary lymph node/nodular density contralateral to the site of injection.  Differential considerations include metastatic lymph node, reactive lymph node from benign right upper extremity process, and fat necrosis.  Recommend physical examination of the upper extremity and consideration of ultrasound for further evaluation of the node/nodule and possible image guided biopsy.  Otherwise, attention on follow-up.     Otherwise, no other hypermetabolic disease identified      Mammo diagnostic right /US 11/4/2020   Impression:   1. No suspicious finding either on mammogram or ultrasound at the site of the palpable lump in the right breast at 10:00 o'clock. A bilateral mammogram is recommended in June 2020 to return to the patient to annual screening.   2. Redemonstration of the morphologically abnormal lymph node in the right axilla that contains a biopsy clip, compatible with the known recurrence metastatic breast cancer that has been treated with chemotherapy. The appearance of this lymph node is unchanged from 06/26/2020 and overall appears smaller when compared to 03/11/2019.     Abd US 12/11/2020   Impression:     1. Echogenic liver likely representing hepatic steatosis.  2. Right upper quadrant ultrasound otherwise is unremarkable.     PET/CT 10/14/21     Impression:     1.  No definite evidence of hypermetabolic tumor.  Interval resolution of hypermetabolic right axillary lymph nodes.  No new hypermetabolic findings.     2.  Persistent low-grade diffuse cutaneous uptake which is nonspecific.    MRI shoulder w w/out contrast 1/26/22   Impression:     Mild edema and postcontrast enhancement at the myotendinous junction of the supraspinatus and infraspinatus, it is nonspecific and could be due to degenerative change or tendinosis.     Small area of interstitial tear at the footprint insertion of the infraspinatus tendon.     No large rotator cuff tear.     No advanced degenerative  change.     No osseous lesions.     PET/CT 1/26/22  Impression:In this patient with breast cancer, there is no evidence of hypermetabolic tumor to suggest recurrent or metastatic disease.   Less extensive but, nonspecific, low-grade diffuse cutaneous uptake of the right breast.         Assessment:       1. Recurrent breast cancer, unspecified laterality    2. 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 History of lung cancer    4. Acute pain of right shoulder    5. Chronic obstructive pulmonary disease, unspecified COPD type        Plan:     1.,2.,3.   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  S/p adjuvant RT completed 9/2014 Pt declined adjuvant chemo  Pt  hospitalized 11/2017 with acute-on-chronic  resp failure  Abnormal CT imaging revealing Large right hilar mass with involvement of  adjacent segmental pulmonary arterial branches and bronchi with associated postobstructive atelectasis  and involvement of mediastinal and axillary adenopathy   S/p rt axillary LN bx ( outside facility) - metastatic poorly differentiated carcinoma of unknown primary  site  status post  lung biopsy 1/31/2017 -benign lung tissue  PET/CT 1/26/2018   Intense FDG uptake within the known right infrahilar lung mass, compatible with malignancy.   Intensely hypermetabolic right axillary, retropectoral, and lower right cervical lymph nodes, compatible with metastases.  Abnormal FDG uptake along right breast skin thickening, new from prior chest CTA and mammogram.    Repeat lung bx 2/14/2018 revealed Squamous Cell CA lung PD-L1 10% EGFR NEGALK NEG  Pt treated for advanced squamous cell CA of lung   She s/p  cycle 6 of carboplatin/Abraxane Day1 completed 7/2/2018 ( day 15 held due to prolonged cytopenias)  She completed therapy with near CR     PET/CT 2/21/2019 showed increased size and irregularity of the right axillary lymph node with  increased FDG avidity     MAMMO/US rt breast 3/2019  reveals suspicious findings rt axilla LN.  Biopsy of the lymph node measuring 1.4 x 1.1 x 1.3     Pt s/p  rt axillary LN bxPositive for poorly differentiated carcinoma.- likely breast primary ERneg PRneg Her 2 neg    Outside review of specimen(s) revealed  lung tumor corresponds to a squamous cell carcinoma and appears to be unrelated to the invasive ductal carcinoma of the breast. It was determined The axillary mass, in my opinion, corresponded  to metastases of the breast primary. Although it is a poorly differentiated carcinoma, theimmunophenotype is most consistent with the one that the breast primary exhibited, and is inconsistent with a metastatic squamous cell carcinoma.     CT imaging 4/26/2019 persistent rt axillary LAD, no other sites of disease     Lymph node biopsy 3/11/2019 Right axilla, mass, core biopsy:Positive for poorly differentiated carcinoma.favor breast primary     PET/CT 6/20/2019  New and worsening hypermetabolic lymph nodes concerning for metastatic breast cancer     Previously Discussed  imaging findings in detail with patient which reveals new and worsening hypermetabolic melena metabolic lymph nodes including hypermetabolic supraclavicular node.  Therefore, at treatment option will be systemic chemotherapy in light of the recent imaging findings.  She will be followed by her surgical oncologist Dr. Christopher      IT was determined to proceed with systemic chemotherapy   S/p  AC [id number] x 3 wks x 4 wks completed 9/6/2019   ( pt has not received in past)      PET/CT 9/19/2019 shows disease response with interval decrease in size and uptake of several right axillary lymph nodes and a supraclavicular lymph node.  Mild residual activity may indicate viable tumor.No new hypermetabolic findings worrisome for malignancy.    Pt followed by  Breast Surgery and plan was  for possible   ALND. Pt with Recurrent cancer in her right axilla and right  supraclavicular fossa.   She completed chemotherapy 9/6/2019 with near CR  It was determined  Radiation will be given to the original areas of hypermetabolic activity in the right axilla and right supraclavicular region    Pt completed  RT 12/16/2019     PET/CT 1/14/21 shows   New right axillary hypermetabolic lymph nodes with preserved normal architecture suggestive of reactive nodes.  Stable appearing previously identified hypermetabolic lymph node with mild increase in surrounding fat stranding.        Findings previously d/w with treating breast surgeon and it was determined to cont to monitor and close f/u as pt is heavily pre treated, has received RT to site   Pt acknowledged understanding and agreeable with plan     PET/CT imaging  5/20/21 shows Continued increase in both size and hypermetabolic activity of right axillary level 1 lymph nodes a new, mildly hypermetabolic cutaneous thickening of the right breast.     Right breast, skin, punch biopsy:Negative for carcinoma    LYMPH NODE, RIGHT AXILLA, BIOPSY: 6/16/21   - Metastatic poorly-differentiated carcinoma, consistent with breast primary triple neg  PD-L1 immunohistochemistry studies(combined positive score, CPS) is greater than or equal to 10   PET/CT showed  No definite evidence of hypermetabolic tumor.  Interval resolution of hypermetabolic right axillary lymph nodes.  No new hypermetabolic findings.     S/p C10   Recent PET/CT imaging reviewed- shows good response and  there is no evidence of hypermetabolic tumor to suggest recurrent or metastatic disease.  Proceed with next cycle   Labs prior to therapy in 3 weeks and prior to f/u 6 weeks    4.  MRI rt shoulder reviewed Small area of interstitial tear at the footprint insertion of the infraspinatus tendon.   No large rotator cuff tear.  Cont OTC meds    Tramadol 50 mg q 8hr prn pain - not helpful  Pt declines Ortho referral     5  Followed by pulmonology. Cont MDI and O2 as prescribed       cbc,cmp,  TSH  prior to chemo in 3 wks and  and f/u 6 weeks     Advance Care Planning     Power of   I previously  initiated the process of advance care planning today and explained the importance of this process to the patient.  I introduced the concept of advance directives to the patient, as well. Then the patient received detailed information about the importance of designating a Health Care Power of  (HCPOA). She was also instructed to communicate with this person about their wishes for future healthcare, should she become sick and lose decision-making capacity. The patient has previously appointed a HCPOA. After our discussion, the patient has decided to complete a HCPOA and has appointed her son and niece and  I spent a total time of 16 minutes discussing this issue with the patient.         Cc: Swetha Katz M.D.         MD Tory Roberson MD Raymond Gould, MD

## 2022-02-23 ENCOUNTER — OFFICE VISIT (OUTPATIENT)
Dept: HEMATOLOGY/ONCOLOGY | Facility: CLINIC | Age: 77
End: 2022-02-23
Payer: MEDICARE

## 2022-02-23 ENCOUNTER — INFUSION (OUTPATIENT)
Dept: INFUSION THERAPY | Facility: HOSPITAL | Age: 77
End: 2022-02-23
Attending: INTERNAL MEDICINE
Payer: MEDICARE

## 2022-02-23 VITALS
SYSTOLIC BLOOD PRESSURE: 139 MMHG | OXYGEN SATURATION: 98 % | HEART RATE: 60 BPM | RESPIRATION RATE: 16 BRPM | DIASTOLIC BLOOD PRESSURE: 83 MMHG | TEMPERATURE: 98 F

## 2022-02-23 VITALS
OXYGEN SATURATION: 97 % | BODY MASS INDEX: 28.9 KG/M2 | HEIGHT: 63 IN | WEIGHT: 163.13 LBS | DIASTOLIC BLOOD PRESSURE: 66 MMHG | TEMPERATURE: 98 F | SYSTOLIC BLOOD PRESSURE: 141 MMHG | HEART RATE: 60 BPM

## 2022-02-23 DIAGNOSIS — T45.1X5A IMMUNODEFICIENCY DUE TO CHEMOTHERAPY: ICD-10-CM

## 2022-02-23 DIAGNOSIS — E07.9 THYROID DISEASE: ICD-10-CM

## 2022-02-23 DIAGNOSIS — J44.9 CHRONIC OBSTRUCTIVE PULMONARY DISEASE, UNSPECIFIED COPD TYPE: ICD-10-CM

## 2022-02-23 DIAGNOSIS — Z85.118 HISTORY OF LUNG CANCER: ICD-10-CM

## 2022-02-23 DIAGNOSIS — C50.919 RECURRENT BREAST CANCER, UNSPECIFIED LATERALITY: Primary | ICD-10-CM

## 2022-02-23 DIAGNOSIS — C50.919 METASTATIC BREAST CANCER: Primary | ICD-10-CM

## 2022-02-23 DIAGNOSIS — M25.511 ACUTE PAIN OF RIGHT SHOULDER: ICD-10-CM

## 2022-02-23 DIAGNOSIS — C50.919 BREAST CANCER: Primary | ICD-10-CM

## 2022-02-23 DIAGNOSIS — D84.821 IMMUNODEFICIENCY DUE TO CHEMOTHERAPY: ICD-10-CM

## 2022-02-23 DIAGNOSIS — Z79.899 IMMUNODEFICIENCY DUE TO CHEMOTHERAPY: ICD-10-CM

## 2022-02-23 PROCEDURE — 1125F PR PAIN SEVERITY QUANTIFIED, PAIN PRESENT: ICD-10-PCS | Mod: HCNC,CPTII,S$GLB, | Performed by: INTERNAL MEDICINE

## 2022-02-23 PROCEDURE — 99999 PR PBB SHADOW E&M-EST. PATIENT-LVL IV: CPT | Mod: PBBFAC,HCNC,, | Performed by: INTERNAL MEDICINE

## 2022-02-23 PROCEDURE — 96413 CHEMO IV INFUSION 1 HR: CPT | Mod: HCNC

## 2022-02-23 PROCEDURE — 99999 PR PBB SHADOW E&M-EST. PATIENT-LVL IV: ICD-10-PCS | Mod: PBBFAC,HCNC,, | Performed by: INTERNAL MEDICINE

## 2022-02-23 PROCEDURE — 3078F PR MOST RECENT DIASTOLIC BLOOD PRESSURE < 80 MM HG: ICD-10-PCS | Mod: HCNC,CPTII,S$GLB, | Performed by: INTERNAL MEDICINE

## 2022-02-23 PROCEDURE — 3077F PR MOST RECENT SYSTOLIC BLOOD PRESSURE >= 140 MM HG: ICD-10-PCS | Mod: HCNC,CPTII,S$GLB, | Performed by: INTERNAL MEDICINE

## 2022-02-23 PROCEDURE — 1125F AMNT PAIN NOTED PAIN PRSNT: CPT | Mod: HCNC,CPTII,S$GLB, | Performed by: INTERNAL MEDICINE

## 2022-02-23 PROCEDURE — 1157F ADVNC CARE PLAN IN RCRD: CPT | Mod: HCNC,CPTII,S$GLB, | Performed by: INTERNAL MEDICINE

## 2022-02-23 PROCEDURE — 25000003 PHARM REV CODE 250: Mod: HCNC | Performed by: INTERNAL MEDICINE

## 2022-02-23 PROCEDURE — 1159F MED LIST DOCD IN RCRD: CPT | Mod: HCNC,CPTII,S$GLB, | Performed by: INTERNAL MEDICINE

## 2022-02-23 PROCEDURE — 3078F DIAST BP <80 MM HG: CPT | Mod: HCNC,CPTII,S$GLB, | Performed by: INTERNAL MEDICINE

## 2022-02-23 PROCEDURE — 1101F PT FALLS ASSESS-DOCD LE1/YR: CPT | Mod: HCNC,CPTII,S$GLB, | Performed by: INTERNAL MEDICINE

## 2022-02-23 PROCEDURE — 3077F SYST BP >= 140 MM HG: CPT | Mod: HCNC,CPTII,S$GLB, | Performed by: INTERNAL MEDICINE

## 2022-02-23 PROCEDURE — 1159F PR MEDICATION LIST DOCUMENTED IN MEDICAL RECORD: ICD-10-PCS | Mod: HCNC,CPTII,S$GLB, | Performed by: INTERNAL MEDICINE

## 2022-02-23 PROCEDURE — 63600175 PHARM REV CODE 636 W HCPCS: Mod: HCNC | Performed by: INTERNAL MEDICINE

## 2022-02-23 PROCEDURE — 1157F PR ADVANCE CARE PLAN OR EQUIV PRESENT IN MEDICAL RECORD: ICD-10-PCS | Mod: HCNC,CPTII,S$GLB, | Performed by: INTERNAL MEDICINE

## 2022-02-23 PROCEDURE — 99499 UNLISTED E&M SERVICE: CPT | Mod: S$GLB,,, | Performed by: INTERNAL MEDICINE

## 2022-02-23 PROCEDURE — 1101F PR PT FALLS ASSESS DOC 0-1 FALLS W/OUT INJ PAST YR: ICD-10-PCS | Mod: HCNC,CPTII,S$GLB, | Performed by: INTERNAL MEDICINE

## 2022-02-23 PROCEDURE — 99214 OFFICE O/P EST MOD 30 MIN: CPT | Mod: HCNC,S$GLB,, | Performed by: INTERNAL MEDICINE

## 2022-02-23 PROCEDURE — 3288F PR FALLS RISK ASSESSMENT DOCUMENTED: ICD-10-PCS | Mod: HCNC,CPTII,S$GLB, | Performed by: INTERNAL MEDICINE

## 2022-02-23 PROCEDURE — 99214 PR OFFICE/OUTPT VISIT, EST, LEVL IV, 30-39 MIN: ICD-10-PCS | Mod: HCNC,S$GLB,, | Performed by: INTERNAL MEDICINE

## 2022-02-23 PROCEDURE — A4216 STERILE WATER/SALINE, 10 ML: HCPCS | Mod: HCNC | Performed by: INTERNAL MEDICINE

## 2022-02-23 PROCEDURE — 99499 RISK ADDL DX/OHS AUDIT: ICD-10-PCS | Mod: S$GLB,,, | Performed by: INTERNAL MEDICINE

## 2022-02-23 PROCEDURE — 3288F FALL RISK ASSESSMENT DOCD: CPT | Mod: HCNC,CPTII,S$GLB, | Performed by: INTERNAL MEDICINE

## 2022-02-23 RX ORDER — HEPARIN 100 UNIT/ML
500 SYRINGE INTRAVENOUS
Status: DISCONTINUED | OUTPATIENT
Start: 2022-02-23 | End: 2022-02-23 | Stop reason: HOSPADM

## 2022-02-23 RX ORDER — SODIUM CHLORIDE 0.9 % (FLUSH) 0.9 %
10 SYRINGE (ML) INJECTION
Status: CANCELLED | OUTPATIENT
Start: 2022-02-23

## 2022-02-23 RX ORDER — SODIUM CHLORIDE 0.9 % (FLUSH) 0.9 %
10 SYRINGE (ML) INJECTION
Status: DISCONTINUED | OUTPATIENT
Start: 2022-02-23 | End: 2022-02-23 | Stop reason: HOSPADM

## 2022-02-23 RX ORDER — HEPARIN 100 UNIT/ML
500 SYRINGE INTRAVENOUS
Status: CANCELLED | OUTPATIENT
Start: 2022-02-23

## 2022-02-23 RX ADMIN — Medication 10 ML: at 09:02

## 2022-02-23 RX ADMIN — HEPARIN 500 UNITS: 100 SYRINGE at 09:02

## 2022-02-23 RX ADMIN — SODIUM CHLORIDE 200 MG: 9 INJECTION, SOLUTION INTRAVENOUS at 08:02

## 2022-02-23 NOTE — PLAN OF CARE
Pt presents to unit from 's office. Labs drawn today and reviewed. Pt cleared for tx today per . Denies any new or worsening complaints today. Plan of care reviewed. Pt received Keytruda over 30 minutes. Tolerated well without any issues. Has next appointments. Left unit in NAD at time of discharge.

## 2022-03-16 ENCOUNTER — INFUSION (OUTPATIENT)
Dept: INFUSION THERAPY | Facility: HOSPITAL | Age: 77
End: 2022-03-16
Attending: INTERNAL MEDICINE
Payer: MEDICARE

## 2022-03-16 VITALS
HEART RATE: 54 BPM | RESPIRATION RATE: 16 BRPM | TEMPERATURE: 98 F | SYSTOLIC BLOOD PRESSURE: 136 MMHG | DIASTOLIC BLOOD PRESSURE: 67 MMHG | OXYGEN SATURATION: 97 %

## 2022-03-16 DIAGNOSIS — C50.919 BREAST CANCER: Primary | ICD-10-CM

## 2022-03-16 DIAGNOSIS — C50.919 METASTATIC BREAST CANCER: ICD-10-CM

## 2022-03-16 DIAGNOSIS — E07.9 THYROID DISEASE: ICD-10-CM

## 2022-03-16 LAB
ALBUMIN SERPL BCP-MCNC: 3.7 G/DL (ref 3.5–5.2)
ALP SERPL-CCNC: 54 U/L (ref 55–135)
ALT SERPL W/O P-5'-P-CCNC: 16 U/L (ref 10–44)
ANION GAP SERPL CALC-SCNC: 8 MMOL/L (ref 8–16)
AST SERPL-CCNC: 18 U/L (ref 10–40)
BASOPHILS # BLD AUTO: 0.04 K/UL (ref 0–0.2)
BASOPHILS NFR BLD: 0.9 % (ref 0–1.9)
BILIRUB SERPL-MCNC: 0.5 MG/DL (ref 0.1–1)
BUN SERPL-MCNC: 12 MG/DL (ref 8–23)
CALCIUM SERPL-MCNC: 9.3 MG/DL (ref 8.7–10.5)
CHLORIDE SERPL-SCNC: 104 MMOL/L (ref 95–110)
CO2 SERPL-SCNC: 27 MMOL/L (ref 23–29)
CREAT SERPL-MCNC: 0.7 MG/DL (ref 0.5–1.4)
DIFFERENTIAL METHOD: NORMAL
EOSINOPHIL # BLD AUTO: 0.3 K/UL (ref 0–0.5)
EOSINOPHIL NFR BLD: 6.2 % (ref 0–8)
ERYTHROCYTE [DISTWIDTH] IN BLOOD BY AUTOMATED COUNT: 13.8 % (ref 11.5–14.5)
EST. GFR  (AFRICAN AMERICAN): >60 ML/MIN/1.73 M^2
EST. GFR  (NON AFRICAN AMERICAN): >60 ML/MIN/1.73 M^2
GLUCOSE SERPL-MCNC: 118 MG/DL (ref 70–110)
HCT VFR BLD AUTO: 41 % (ref 37–48.5)
HGB BLD-MCNC: 13.5 G/DL (ref 12–16)
IMM GRANULOCYTES # BLD AUTO: 0.01 K/UL (ref 0–0.04)
IMM GRANULOCYTES NFR BLD AUTO: 0.2 % (ref 0–0.5)
LYMPHOCYTES # BLD AUTO: 1 K/UL (ref 1–4.8)
LYMPHOCYTES NFR BLD: 21.8 % (ref 18–48)
MCH RBC QN AUTO: 30.8 PG (ref 27–31)
MCHC RBC AUTO-ENTMCNC: 32.9 G/DL (ref 32–36)
MCV RBC AUTO: 94 FL (ref 82–98)
MONOCYTES # BLD AUTO: 0.5 K/UL (ref 0.3–1)
MONOCYTES NFR BLD: 11 % (ref 4–15)
NEUTROPHILS # BLD AUTO: 2.6 K/UL (ref 1.8–7.7)
NEUTROPHILS NFR BLD: 59.9 % (ref 38–73)
NRBC BLD-RTO: 0 /100 WBC
PLATELET # BLD AUTO: 189 K/UL (ref 150–450)
PMV BLD AUTO: 9.6 FL (ref 9.2–12.9)
POTASSIUM SERPL-SCNC: 4.1 MMOL/L (ref 3.5–5.1)
PROT SERPL-MCNC: 6.5 G/DL (ref 6–8.4)
RBC # BLD AUTO: 4.38 M/UL (ref 4–5.4)
SODIUM SERPL-SCNC: 139 MMOL/L (ref 136–145)
TSH SERPL DL<=0.005 MIU/L-ACNC: 2.22 UIU/ML (ref 0.4–4)
WBC # BLD AUTO: 4.35 K/UL (ref 3.9–12.7)

## 2022-03-16 PROCEDURE — 84443 ASSAY THYROID STIM HORMONE: CPT | Performed by: INTERNAL MEDICINE

## 2022-03-16 PROCEDURE — 80053 COMPREHEN METABOLIC PANEL: CPT | Performed by: INTERNAL MEDICINE

## 2022-03-16 PROCEDURE — 63600175 PHARM REV CODE 636 W HCPCS: Performed by: INTERNAL MEDICINE

## 2022-03-16 PROCEDURE — A4216 STERILE WATER/SALINE, 10 ML: HCPCS | Performed by: INTERNAL MEDICINE

## 2022-03-16 PROCEDURE — 85025 COMPLETE CBC W/AUTO DIFF WBC: CPT | Performed by: INTERNAL MEDICINE

## 2022-03-16 PROCEDURE — 96413 CHEMO IV INFUSION 1 HR: CPT

## 2022-03-16 PROCEDURE — 25000003 PHARM REV CODE 250: Performed by: INTERNAL MEDICINE

## 2022-03-16 RX ORDER — HEPARIN 100 UNIT/ML
500 SYRINGE INTRAVENOUS
Status: DISCONTINUED | OUTPATIENT
Start: 2022-03-16 | End: 2022-03-17 | Stop reason: HOSPADM

## 2022-03-16 RX ORDER — SODIUM CHLORIDE 0.9 % (FLUSH) 0.9 %
10 SYRINGE (ML) INJECTION
Status: CANCELLED | OUTPATIENT
Start: 2022-03-16

## 2022-03-16 RX ORDER — HEPARIN 100 UNIT/ML
500 SYRINGE INTRAVENOUS
Status: CANCELLED | OUTPATIENT
Start: 2022-03-16

## 2022-03-16 RX ORDER — SODIUM CHLORIDE 0.9 % (FLUSH) 0.9 %
10 SYRINGE (ML) INJECTION
Status: DISCONTINUED | OUTPATIENT
Start: 2022-03-16 | End: 2022-03-17 | Stop reason: HOSPADM

## 2022-03-16 RX ADMIN — HEPARIN 500 UNITS: 100 SYRINGE at 11:03

## 2022-03-16 RX ADMIN — Medication 10 ML: at 11:03

## 2022-03-16 RX ADMIN — SODIUM CHLORIDE 200 MG: 9 INJECTION, SOLUTION INTRAVENOUS at 10:03

## 2022-03-17 NOTE — PLAN OF CARE
Pt arrived to unit. VSS. Blood collected for cbc, cmp, tsh. Dr. Holliday reviewed. Pt tolerated Keytruda. No reactions noted. Pt given AVS. Pt ambulated off unit. No distress noted.

## 2022-03-22 RX ORDER — FLUTICASONE PROPIONATE 50 MCG
SPRAY, SUSPENSION (ML) NASAL
Qty: 16 G | Refills: 5 | Status: SHIPPED | OUTPATIENT
Start: 2022-03-22 | End: 2023-06-20

## 2022-04-05 NOTE — PROGRESS NOTES
Subjective:       Patient ID: Ade Castellano is a 76 y.o. female.    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 The PET(+) disease in the right axilla and right supraclavicular fossa will be boosted at 200 cGy per fraction to 6000 cGy total dose.  S/p LYMPH NODE, RIGHT AXILLA, BIOPSY: 6/16/21 . Metastatic poorly-differentiated carcinoma, c/w breast primary ERnegPRneg Her2 neg  PD-L1 (22C3) IHC CPS> is greater than or equal to 10  Pembrolizumab 7/22/21 -present        Prior Hx:  77 y/o female with history of  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  And Rt  breast Haja/p  cycle 6 of carboplatin/Abraxane 7/2/2018   She has  History of Stage 1A  Rt breast cancer Grade 3, ER/MD neg , Her 2 jose maria neg s/p lumpectomy 7/11/2014 and s/p adjuvant RT 9/2014 Pt declined Adjuvant chemo.Pathology showed a 1.15 cm, grade 3 infiltrating ductal carcinoma.  One sentinel lymph node was negative for malignancy.  Margins were negative and the closest margin was 1 cm.  She was staged  pT1c  pN0 cM0 stage IA.  She declined adjuvant chemo therapy.  She completed post operative radiation therapy at 400 cGy per fraction to 4000 cGy 9/2014  Pt hospitalized 11/2017 for  acute on chronic respiratory failure requiring intubation and ventilation. Has large lung mass in right lung with probable post obstructive pneumonia resulting in COPD exacerbation.CTA  chest 11/29/2017 revealed   Large right hilar mass with involvement of adjacent segmental pulmonary arterial branches and bronchi with associated postobstructive atelectasis and volume loss in the RML  with adjacent ground glass opacity concerning for underlying neoplastic process. Mediastinal and axillary adenopathy, with a right axillary lymph node measuring up to 2.0 cm in short axis diameter.She underwent rt axillary LN bx at outside facility- on 1/16/2018 at [facility]. Pathology revealed metastatic poorly differentiated carcinoma of unknown primary site. She next underwent lung bx at [facility] 1/31/2018  benign lung tissue. Repeat Right Lung Bx 2/14/2018 Pathology reveals Squamous cell carcinoma PD-L1 10% low expression EGFR NEG ALK NEG BRAF NEG. Outside slides axillary LN specimen were reviewed/comparison to lung bx findings . She completed    cycle 6 of carboplatin/Abraxane completed 7/2018 .PET/CT  4/19/2018 revealed there has been a excellent response to therapy.  At least 90% reduction in malignant activity.PET/CT 2/21/2019 increased size and irregularity of the right axillary lymph node with increased FDG avidityMAMMO/US rt breast 2/2019 revealed suspicious findings rt axilla LN.  Right axilla, mass, core biopsy: Positive for poorly differentiated carcinoma breast primary ER neg/GA neg Her2 neg.[facility] reviewIt was determined  the lung tumor corresponds to a squamous cellcarcinoma and appears to be unrelated to the invasive ductal carcinoma of the breast. The axillary mass, in myopinion, corresponds to metastases of the breast primary. Although it is a poorly differentiated carcinoma, theimmunophenotype is most consistent with the one that the breast primary exhibited, and is inconsistent with metastatic squamous cell carcinoma. She was treated with  AC ( adriamycin 60m/m2/Cytoxan 600mg/m2)  q21d x 4 cycles completed 9/6/2019 . PET/CT 9/19/2019 shows disease response l decrease in  size and uptake of several right axillary lymph nodes and a supraclavicular lymph node.  Mild residual activity may indicate viable tumor.Pt followed by Rad/Onc. She was presented at [facility] tumor board and it was determined to proceed Radiation therapy only. No ALND due to concurrent lung and supraclavicular node. She  completed  RT 12/16/2019  To right axilla and right supraclavicular region PET/CT imaging  5/20/21 shows Continued increase in both size and hypermetabolic activity of right axillary level 1 lymph nodes a new, mildly hypermetabolic cutaneous thickening of the right breast. she underwent LYMPH NODE, RIGHT AXILLA, BIOPSY: 6/16/21 . Pathology showed Metastatic poorly-differentiated carcinoma, consistent with breast primary-ERneg/ PRneg  HER2  neg          Interval Hx: Pt started on pembrolizumab 7/2021  She reports worsening cough and LOGAN  She reports rt neck pain   Mild dysphagia   Appetite diminished   Last cycle immunotherapy  3/16/22   Appetite diminished  Wt loss 6 lbs    She reports pain Rt shoulder/RUE improved with OTC acetaminophen arthritis  Prescribed tramadol not working  She reports difficulty with ROM 2/2 pain  MRI shoulder w w/out contrast 1/26/22  Mild edema and postcontrast enhancement at the myotendinous junction of the supraspinatus and infraspinatus, it is nonspecific and could be due to degenerative change or tendinosis.Small area of interstitial tear at the footprint insertion of the infraspinatus tendon.   No large rotator cuff tear.      She is followed by Dr. Katz, pulmonology    She also has been followed by GI for dysphagia  In past  She has undergone dilation      Last Mammo 6/16/21  No mammographic evidence of malignancy.BI-RADS Category 1: Negative      PET/CT 1/26/22 No definite evidence of hypermetabolic tumor.  Interval resolution of hypermetabolic right axillary lymph nodes.  No new hypermetabolic findings.        PrevHx:She had an abnormal mammogram 6/4/2014  "whichRevealed a round solid mass 6mm in rt breast.She then underwent U/S guided core bx of rt breast mass on 6/17/2014.Pathology revealed infiltrating ductal carcinoma Grade 3 with tumorPresent in thin-walled spaces suggestive of lymphatic spaces. HormoneReceptor status on tumor specimen revealed ER negative 0% ,NH negative 0% and Her 2 jose maria negative.She subsequently underwent rt segmental mastectomy and SLN bxOn 7/11/2014. Pathology of rt breast lumpectomy revealed invasiveDuctal carcinoma with micropapillary pattern ( Invasive micropapillaryCa) with max tumor dimension 11.5mm with suggestion of tumor in Thin walled spaces c/w lymphovascular involvement. SurgicalMargins free of tumor, grade 3, ER/NH neg , Her 2 jose maria neg With [facility] Lymph node negative for Neoplasm. Pathologic staging [id number](i-)Her mother was diagnosed with breast cancer in her 50's/        Review of Systems   Constitutional: Positive for activity change, appetite change, fatigue and unexpected weight change. Negative for fever.   HENT: Positive for trouble swallowing. Negative for mouth sores and rhinorrhea.    Eyes: Negative for visual disturbance.   Respiratory: Positive for cough and shortness of breath (LOGAN, chronic, stable). Negative for wheezing.    Cardiovascular: Negative for chest pain.   Gastrointestinal: Negative for abdominal pain and diarrhea.   Genitourinary: Negative for frequency.   Musculoskeletal: Positive for arthralgias. Negative for back pain.   Skin: Negative for rash.   Neurological: Positive for weakness. Negative for dizziness and headaches.   Hematological: Negative for adenopathy.   Psychiatric/Behavioral: The patient is not nervous/anxious.        Objective:          Vitals:    04/06/22 0812   BP: 134/68   BP Location: Left arm   Patient Position: Sitting   BP Method: Large (Automatic)   Pulse: 64   Temp: 98 °F (36.7 °C)   TempSrc: Oral   SpO2: (!) 93%   Weight: 73.6 kg (162 lb 4.1 oz)   Height: 5' 3" (1.6 m) "         Physical Exam   Constitutional: She is oriented to person, place, and time. She appears ill  HENT:   Head: Normocephalic.   Mouth/Throat: Oropharynx is clear and moist. No oropharyngeal exudate.   Eyes: Conjunctivae and lids are normal. Pupils are equal, round, and reactive to light. No scleral icterus.   Neck: Normal range of motion. Neck supple. No thyromegaly present.   Cardiovascular: Normal rate, regular rhythm and normal heart sounds.    No murmur heard.  Pulmonary/Chest: Breath sounds normal. She has no wheezes. She has no rales.   Abdominal: Soft. Bowel sounds are normal. She exhibits no distension and no mass. There is no hepatosplenomegaly. There is no tenderness. There is no rebound and no guarding.   Left breast- no masses or axillary LAD noted  Right breast- breast thickening inner quad    She has no cervical adenopathy.     She has rt axillary adenopathy.        Right: No supraclavicular adenopathy present.        Left: No supraclavicular adenopathy present.   Neurological: She is alert and oriented to person, place, and time. No cranial nerve deficit. Coordination normal.   Skin: Skin is warm and dry. No ecchymosis, no petechiae and no rash noted. No erythema.   Psychiatric: She has a normal mood and affect.             CT a/p w/contrast 11/29/2018   1. No acute abnormality identified within the abdomen and pelvis.    2.  There are a few nonspecific prominent lymph nodes in the upper abdomen including a periesophageal lymph node which measures 1.0 cm in short axis diameter.    3.  Significant abdominal aortic atherosclerosis and abdominal aorta ectasia.    4.  Additional findings as above.    PET/CT 1/26/2018   1.  Intense FDG uptake within the known right infrahilar lung mass, compatible with malignancy.  It is unclear if this represents primary lung malignancy or metastatic breast cancer.    2.  Intensely hypermetabolic right axillary, retropectoral, and lower right cervical lymph nodes,  compatible with metastases.    3.  Abnormal FDG uptake along right breast skin thickening, new from prior chest CTA and mammogram.  This may relate to localized edema/inflammation, though correlation with physical exam and mammography are recommended to exclude underlying inflammatory carcinoma.      MRI Brain w w/out contrast 2/9/2018  1.  No evidence of intracranial metastases.  2.  Sinus disease       Rt axillary LN bx at outside facility- on 1/16/2018 at [facility]  Pathology revealed metastatic poorly differentiated carcinoma of unknown primary  site 1/16/2018 at [facility]  TTF-1 negative, napsin negative  , cytokeratin 7 positive, cytokeratin 20 negative, p63 negative, cytokeratin 5/6 focal dim positivity    LUNG BIOPSY 1/31/2018  Pathology revealed benign lung tissue         SPECIMEN  1) Right Lung Bx 2/14/2018  Supplemental Diagnosis  Immunohistochemical stains show strong nuclear staining for p63 in essentially all tumor cells and very strong  cytoplasmic and membrane staining for CK5/6, also in essentially all tumor cells. TTF-1 and CK7 are negative  within tumor cells but do stain native pulmonary elements present within the biopsy. A stain for mucicarmine is  negative. Positive and negative controls function appropriately.  Final diagnosis: Specimen submitted as right lung biopsy  -Squamous cell carcinoma  (Electronically Signed: 2018-02-20 09:12:07 )  Diagnosed by: Phong Thompson  FINAL PATHOLOGIC DIAGNOSIS  Fragments of pulmonary parenchyma (submitted as right lung biopsy):    FINAL PATHOLOGIC DIAGNOSIS 1. Lymph node, right axilla, biopsy, review of 10 outside slides [facility], JS [phone],  collected January 11, 2018: Metastatic poorly differentiated carcinoma (see comment).  The histologic section shows fibrous stroma infiltrated by nest of poorly differentiated malignant cells including  occasional dyskeratotic cells morphologically suggestive of  metastatic squamous cell carcinoma.  Immunohistochemical stains are nonspecific; the cells are positive for cytokeratin 7 and cytokeratin 5/6 (focal) and  negative for cytokeratin 20, TTF-1, p63, GCDFP, mammoglobin, and CEA        PET/CT 2/21/2019  Increased size and irregularity of the right axillary lymph node with increased FDG avidity.  Although this would be atypical in location for lung carcinoma, the increased size and avidity must be concerning for metastatic disease in this patient with history of squamous cell lung cancer.     US Rt breast and mammo 2/26/2019  Impression:  Right  Lymph Node: Right axilla lymph node. Assessment: 4 - Suspicious finding. Biopsy is recommended.      BI-RADS Category:   Right: 4 - Suspicious  Overall: 4 - Suspicious     Recommendation:  Biopsy is recommended. Biopsy of the lymph node measuring 1.4 x 1.1 x 1.3 recommended , the one with the round shape configuration, corresponding to the most recent PET CT finding.         Pathology 3/11/2019   FINAL PATHOLOGIC DIAGNOSIS  Right axilla, mass, core biopsy:  Positive for poorly differentiated carcinoma.  See comment.  Comment:  The biopsy from the right axilla mass shows a poorly differentiated carcinoma in background lymphoid tissue  with enlarged cells showing increased nuclear size, prominent nucleoli, moderate amounts of cytoplasm and mitotic  figures. Immunostains are completed and reveal the tumor cells to stain positively with cytokeratin AE 1/AE3, CK  5/6 and CK 7. The tumor cells are negative for CK 20, Estrogen receptor, p63 and TTF-1. All stains have  satisfactory positive and negative controls. The patient's prior cases will be reviewed and an additional stain for  JUAREZ-3 is pending in an attempt to pinpoint the primary site of this malignancy and results will follow in a  supplemental report.      Supplemental Diagnosis  3/11/2019  The current axillary mass is compared to the patient's prior right lung biopsy (case  number [id number]) and the  current axillary mass is morphologically similar to the lung malignancy. Also, the current axillary mass is compared  to the patient's prior breast resection (Case number [id number]) and appears similar as well. P63 is negative in the  [id number] tumor and CK 5/6 shows scattered positive staining in the [id number] tumor.  Immunostain for JUAREZ-3 is completed and shows only rare weak to moderate staining within the tumor cell nuclei  with satisfactory positive and negative controls. This stain is positive in the surrounding lymphocytes. This staining  pattern is non-specific and does not definitively differentiate between a lung and breast primary malignancy in this  right axilla mass biopsy. The staining profile of the current axillary mass match more closely with the previous  breast carcinoma (due to the p63 negativity in both the 2014 breast cancer and the current axillary mass lesion but  p63 positivity in the lung mass from 2018).  This staining profile, together with the comparison with the patient's prior breast tumor and prior lung tumor supports  a diagnosis of poorly differentiated carcinoma and the malignancy appears morphologically similar to the prior  malignancies from both sites, but the staining profile in this small sample from the axillary mass more closely  correlates with the prior breast malignancy. Pathologic subclassification of this malignancy's primary location is not  definitive and clinical correlation is recommended definitively decide on possible primary location in this patient's  axillary mass sample.  Supplemental (2):  Additional immunohistochemical staining for progesterone receptor and HER2 are completed per clinician request  with following results:  Progesterone receptor: Negative; 0% nuclear tumor cell staining.  HER2: Negative; stain score = 0.  Supplemental (3):  [place] DIAGNOSIS:  FINAL DIAGNOSIS  Breast, right, needle core biopsy ([id number]; 06/17/2014):  Invasive ductal carcinoma, Jaiden grade III (of  III), with focal micropapillary features.  Immunohistochemical stains performed at the referring institution show that the tumor cells have the following  phenotype: - Estrogen receptor: Negative (0% tumor cells staining). - Progesterone receptor: Negative (0% tumor  cells staining). - HER2: Negative (score 0).  Lymph nodes, right, sentinel biopsy and lumpectomy ([id number]; 07/11/2014):  1. Right sentinel lymph node: A single (1) lymph node is negative for metastatic carcinoma.  Immunohistochemical stains performed by the referring institution and reviewed at [facility] for cytokeratin are  negative, confirming the diagnosis.  2. Right breast: Invasive ductal carcinoma with micropapillary features, per report 11.5 mm in greatest dimension.  Foci suspicious for lymphovascular invasion identified. Margins of resection are free of tumor.  Immunohistochemical stains performed by the referring institution and reviewed at [facility] show that the tumor  cells are negative for p63 and show patchy positivity for CK 5/6.  Lung, right, needle core biopsy ([id number]; 02/14/2018): Squamous cell carcinoma.    Immunohistochemical stains performed by the referring institution show the tumor cells are strongly positive for p63  and CK 5/6, while negative for TTF-1 and CK7. These result support the diagnosis. Axilla, right, needle core biopsy  ([id number]; 03/11/2019): Lymph node positive for metastatic carcinoma, most consistent with breast primary  (see comment).  Immunohistochemical stains performed by the referring institution and reviewed at [facility] show that the tumor  cells are negative for p63, focally positive for CK 5/6, focally and weakly positive for JUAREZ-3, and strongly positive  for CK7. They are also negative for estrogen receptor, progesterone receptor, and HER2 JAM (score 0).  COMMENT:  Thank you for allowing me to review the case of this 73-year-old lady  who recently underwent needle core biopsies  of a right axillary mass and who has a previous diagnosis of an invasive ductal carcinoma of the right breast, as well  as a squamous cell carcinoma of the lung. I am reviewing this case because of my special interest in breast  pathology.  I agree with your interpretation of this case. In my opinion, the lung tumor corresponds to a squamous cell  carcinoma and appears to be unrelated to the invasive ductal carcinoma of the breast. The axillary mass, in my  opinion, corresponds to metastases of the breast primary. Although it is a poorly differentiated carcinoma, the  immunophenotype is most consistent with the one that the breast primary exhibited, and is inconsistent with a  metastatic squamous cell carcinoma. I did share the case with Dr. Anabel Stone, one of our pulmonary pathologists,  and she concurs with this interpretation.  Note: Report attached.  Performing location:  [place] [address]      LYMPH NODE, RIGHT AXILLA, BIOPSY: 6/16/21   - Metastatic poorly-differentiated carcinoma, consistent with breast primary  -ER, [place], and HER2 immunohistochemical hormonal markers are also negative  PD-L1 (22C3) SemiQuant IHC, Manual  Interpretation  Right axillary lymph node , specimen for PD-L1 immunohistochemistry studies (clone 22C3, [employer], [place], [place]; using a proprietary detection system) ([id number]):  Reported carcinoma site of origin: Breast  Result: The percent of PD-L1 positive cells based upon the total number of tumor cells (combined positive  score, CPS) is greater than or equal to 10.  Interpretation: Studies suggest that positive PD-L1 immunohistochemistry in tumor cells and/or tumorassociated  immune cells may predict tumor response to therapy with immune checkpoint inhibitors. This  result should not be used as the sole factor in determining treatment, as other factors  (for example, tumor  mutation burden, and microsatellite instability) have been also studied as predictive markers.          CT neck/chest/abd/pelvis w/contrast 4/26/2019   The previously described right hilar mass is no longer visible.  There is persistent volume loss in the right middle lobe this appears similar to the prior PET-CT February 21, 2019.    Persistent abnormal right axillary node today measuring about 15 mm compared 18 mm prior.  The positioning of the adjacent nodes is slightly different likely accounting for the slight difference in measurement.    Cluster of tree-in-bud nodular densities left lower lobe series 2, image 93.  This may be related to small airways disease though serial follow-up suggested.      PET/CT 6/20/2019   New and worsening hypermetabolic lymph nodes concerning for metastatic breast cancer as described above.  Tissue sampling would be required for definitive diagnosis.      2-D echo 6/27/2019   · Normal left ventricular systolic function. The estimated ejection fraction is 55%  · Concentric left ventricular remodeling.  · Normal LV diastolic function.  · Normal right ventricular systolic function.  · Moderate left atrial enlargement.  · Mild right atrial enlargement.  · Mild aortic regurgitation.  · Mild mitral regurgitation.  · Mild tricuspid regurgitation.  · Normal central venous pressure (3 mm Hg).  · The estimated PA systolic pressure is 25 mm Hg      PET/CT 9/19/2019   Favorable response to therapy with interval decrease in size and uptake of several right axillary lymph nodes and a supraclavicular lymph node.  Mild residual activity may indicate viable tumor.    No new hypermetabolic findings worrisome for malignancy.    PET/CT 2/28/2020  1.  No evidence of hypermetabolic tumor.  Continued improvement of the right axilla status post adjuvant radiation.    2.  No new hypermetabolic lesions      CTA 6/17/2020  1. No evidence of pulmonary embolus. No aortic dissection.   2.  There is no evidence for new or progressive disease in the chest as compared to PET/CT 6/20/2019 in this patient with history of right breast cancer and right lung neoplasm. The patient does have a more recent PET/CT 2/28/2020 from an outside facility per the electronic medical record although images are not available for direct comparison. Correlation with these images may be beneficial. Continued long-term surveillance recommended.  3. Stable appearance of the treated tumor in the right hilum/middle lobe.  4. Stable treated right breast cancer and axillary adenopathy without evidence for developing breast mass or new right axillary adenopathy.  5. Stable tiny nodularity/tree-in-bud densities in the left lung, probably postinfectious or inflammatory.  6. Wall thickening of the esophagus may be correlated for esophagitis. Possible tiny hiatus hernia.  7. Slight bronchial wall thickening may be correlated for bronchitis.  8. Mild to moderate emphysema as before.  9. Reflux of contrast into the IVC and hepatic veins is nonspecific but may be correlated for elevated right heart pressures.  10. Coronary calcifications and/or stents similar to prior.    Echo 5/21/2020  Technically difficult study.   Normal left ventricular systolic function.   Left ventricular ejection fraction is estimated at 55%.   Severe mitral annular calcification.   Mild mitral valve regurgitation.   Aortic valve sclerosis without stenosis or regurgitation.     PFTs 6/17/2020  Spirometry reveals a very severe obstructive lung defect, which does not significantly improve post bronchodilators. Lung volumes revealed air trapping. Diffusing capacity was moderately impaired.        Del 6/26/2020  BI-RADS Category:   Overall: 2 - Benign      PET/CT 10/23/2020   Impression:     Stable mildly hypermetabolic right axillary lymph node/nodular density contralateral to the site of injection.  Differential considerations include metastatic lymph node, reactive  lymph node from benign right upper extremity process, and fat necrosis.  Recommend physical examination of the upper extremity and consideration of ultrasound for further evaluation of the node/nodule and possible image guided biopsy.  Otherwise, attention on follow-up.     Otherwise, no other hypermetabolic disease identified      Mammo diagnostic right /US 11/4/2020   Impression:   1. No suspicious finding either on mammogram or ultrasound at the site of the palpable lump in the right breast at 10:00 o'clock. A bilateral mammogram is recommended in June 2020 to return to the patient to annual screening.   2. Redemonstration of the morphologically abnormal lymph node in the right axilla that contains a biopsy clip, compatible with the known recurrence metastatic breast cancer that has been treated with chemotherapy. The appearance of this lymph node is unchanged from 06/26/2020 and overall appears smaller when compared to 03/11/2019.     Abd US 12/11/2020   Impression:     1. Echogenic liver likely representing hepatic steatosis.  2. Right upper quadrant ultrasound otherwise is unremarkable.     PET/CT 10/14/21     Impression:     1.  No definite evidence of hypermetabolic tumor.  Interval resolution of hypermetabolic right axillary lymph nodes.  No new hypermetabolic findings.     2.  Persistent low-grade diffuse cutaneous uptake which is nonspecific.    MRI shoulder w w/out contrast 1/26/22   Impression:     Mild edema and postcontrast enhancement at the myotendinous junction of the supraspinatus and infraspinatus, it is nonspecific and could be due to degenerative change or tendinosis.     Small area of interstitial tear at the footprint insertion of the infraspinatus tendon.     No large rotator cuff tear.     No advanced degenerative change.     No osseous lesions.     PET/CT 1/26/22  Impression:In this patient with breast cancer, there is no evidence of hypermetabolic tumor to suggest recurrent or metastatic  disease.   Less extensive but, nonspecific, low-grade diffuse cutaneous uptake of the right breast.         Assessment:       1. Recurrent breast cancer, unspecified laterality    2. Malignant neoplasm of central portion of right breast in female, estrogen receptor negative    3. 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 History of lung cancer    5. Generalized weakness    6. Hypokalemia    7. LOGAN (dyspnea on exertion)    8. Chronic obstructive pulmonary disease, unspecified COPD type        Plan:     1.,2.,3., 4.   Pt with hx of Stage 1A invasive ductal carcinoma rt breast s/p rt segmental mastectomy and SLN bx 7/11/2014 ER/AR neg HER 2 jose maria neg [id number](i-).  S/p adjuvant RT completed 9/2014 Pt declined adjuvant chemo  Pt  hospitalized 11/2017 with acute-on-chronic  resp failure  Abnormal CT imaging revealing Large right hilar mass with involvement of  adjacent segmental pulmonary arterial branches and bronchi with associated postobstructive atelectasis  and involvement of mediastinal and axillary adenopathy   S/p rt axillary LN bx ( outside facility) - metastatic poorly differentiated carcinoma of unknown primary  site  status post  lung biopsy 1/31/2017 -benign lung tissue  PET/CT 1/26/2018   Intense FDG uptake within the known right infrahilar lung mass, compatible with malignancy.   Intensely hypermetabolic right axillary, retropectoral, and lower right cervical lymph nodes, compatible with metastases.  Abnormal FDG uptake along right breast skin thickening, new from prior chest CTA and mammogram.    Repeat lung bx 2/14/2018 revealed Squamous Cell CA lung PD-L1 10% EGFR NEGALK NEG  Pt treated for advanced squamous cell CA of lung   She s/p  cycle 6 of carboplatin/Abraxane Day1 completed 7/2/2018 ( day 15 held due to prolonged cytopenias)  She completed therapy with near CR     PET/CT 2/21/2019 showed increased size and irregularity of the right axillary lymph node with increased FDG avidity     MAMMO/US rt  breast 3/2019  reveals suspicious findings rt axilla LN.  Biopsy of the lymph node measuring 1.4 x 1.1 x 1.3     Pt s/p  rt axillary LN bxPositive for poorly differentiated carcinoma.- likely breast primary ERneg PRneg Her 2 neg    Outside review of specimen(s) revealed  lung tumor corresponds to a squamous cell carcinoma and appears to be unrelated to the invasive ductal carcinoma of the breast. It was determined The axillary mass, in my opinion, corresponded  to metastases of the breast primary. Although it is a poorly differentiated carcinoma, theimmunophenotype is most consistent with the one that the breast primary exhibited, and is inconsistent with a metastatic squamous cell carcinoma.     CT imaging 4/26/2019 persistent rt axillary LAD, no other sites of disease     Lymph node biopsy 3/11/2019 Right axilla, mass, core biopsy:Positive for poorly differentiated carcinoma.favor breast primary     PET/CT 6/20/2019  New and worsening hypermetabolic lymph nodes concerning for metastatic breast cancer     Previously Discussed  imaging findings in detail with patient which reveals new and worsening hypermetabolic melena metabolic lymph nodes including hypermetabolic supraclavicular node.  Therefore, at treatment option will be systemic chemotherapy in light of the recent imaging findings.  She will be followed by her surgical oncologist Dr. Christopher      IT was determined to proceed with systemic chemotherapy   S/p  AC [id number] x 3 wks x 4 wks completed 9/6/2019   ( pt has not received in past)      PET/CT 9/19/2019 shows disease response with interval decrease in size and uptake of several right axillary lymph nodes and a supraclavicular lymph node.  Mild residual activity may indicate viable tumor.No new hypermetabolic findings worrisome for malignancy.    Pt followed by  Breast Surgery and plan was  for possible   ALND. Pt with Recurrent cancer in her right axilla and right supraclavicular fossa.   She completed  chemotherapy 9/6/2019 with near CR  It was determined  Radiation will be given to the original areas of hypermetabolic activity in the right axilla and right supraclavicular region    Pt completed  RT 12/16/2019     PET/CT 1/14/21 shows   New right axillary hypermetabolic lymph nodes with preserved normal architecture suggestive of reactive nodes.  Stable appearing previously identified hypermetabolic lymph node with mild increase in surrounding fat stranding.        Findings previously d/w with treating breast surgeon and it was determined to cont to monitor and close f/u as pt is heavily pre treated, has received RT to site   Pt acknowledged understanding and agreeable with plan     PET/CT imaging  5/20/21 shows Continued increase in both size and hypermetabolic activity of right axillary level 1 lymph nodes a new, mildly hypermetabolic cutaneous thickening of the right breast.     Right breast, skin, punch biopsy:Negative for carcinoma    LYMPH NODE, RIGHT AXILLA, BIOPSY: 6/16/21   - Metastatic poorly-differentiated carcinoma, consistent with breast primary triple neg  PD-L1 immunohistochemistry studies(combined positive score, CPS) is greater than or equal to 10   PET/CT showed  No definite evidence of hypermetabolic tumor.  Interval resolution of hypermetabolic right axillary lymph nodes.  No new hypermetabolic findings.     S/p C12 3/16/22   Last  PET/CT imaging reviewed- shows good response and  there is no evidence of hypermetabolic tumor to suggest recurrent or metastatic disease.  Hold next cycle   Labs prior to therapy in 3 weeks and prior to f/u 6 weeks    MRI rt shoulder reviewed Small area of interstitial tear at the footprint insertion of the infraspinatus tendon.   No large rotator cuff tear.  Cont OTC meds    Tramadol 50 mg q 8hr prn pain - not helpful  Pt declines Ortho referral     .5,.6.7, Referral to ED   Refer to ED    K critically low  . CT chest   IVF hydration  . Check Mg   Admit  hospitalization    8  Followed by pulmonology. Cont MDI and O2 as prescribed     Postpone chemo  Referral to ED   Advance Care Planning     Power of   I previously  initiated the process of advance care planning today and explained the importance of this process to the patient.  I introduced the concept of advance directives to the patient, as well. Then the patient received detailed information about the importance of designating a Health Care Power of  (HCPOA). She was also instructed to communicate with this person about their wishes for future healthcare, should she become sick and lose decision-making capacity. The patient has previously appointed a HCPOA. After our discussion, the patient has decided to complete a HCPOA and has appointed her son and niece and  I spent a total time of 16 minutes discussing this issue with the patient.         Cc: Swetha Katz M.D.         MD Tory Robesron MD Raymond Gould, MD

## 2022-04-06 ENCOUNTER — OFFICE VISIT (OUTPATIENT)
Dept: HEMATOLOGY/ONCOLOGY | Facility: CLINIC | Age: 77
End: 2022-04-06
Payer: MEDICARE

## 2022-04-06 ENCOUNTER — HOSPITAL ENCOUNTER (OUTPATIENT)
Facility: HOSPITAL | Age: 77
Discharge: HOME | End: 2022-04-07
Attending: EMERGENCY MEDICINE | Admitting: INTERNAL MEDICINE
Payer: MEDICARE

## 2022-04-06 ENCOUNTER — LAB VISIT (OUTPATIENT)
Dept: LAB | Facility: HOSPITAL | Age: 77
End: 2022-04-06
Attending: INTERNAL MEDICINE
Payer: MEDICARE

## 2022-04-06 VITALS
OXYGEN SATURATION: 93 % | TEMPERATURE: 98 F | SYSTOLIC BLOOD PRESSURE: 134 MMHG | DIASTOLIC BLOOD PRESSURE: 68 MMHG | HEIGHT: 63 IN | BODY MASS INDEX: 28.75 KG/M2 | WEIGHT: 162.25 LBS | HEART RATE: 64 BPM

## 2022-04-06 DIAGNOSIS — C34.91 SQUAMOUS CELL CARCINOMA OF RIGHT LUNG: ICD-10-CM

## 2022-04-06 DIAGNOSIS — C50.919 METASTATIC BREAST CANCER: ICD-10-CM

## 2022-04-06 DIAGNOSIS — R06.09 DOE (DYSPNEA ON EXERTION): ICD-10-CM

## 2022-04-06 DIAGNOSIS — C50.919 RECURRENT BREAST CANCER, UNSPECIFIED LATERALITY: ICD-10-CM

## 2022-04-06 DIAGNOSIS — I95.1 ORTHOSTATIC HYPOTENSION: Primary | ICD-10-CM

## 2022-04-06 DIAGNOSIS — R53.1 GENERALIZED WEAKNESS: ICD-10-CM

## 2022-04-06 DIAGNOSIS — Z17.1 MALIGNANT NEOPLASM OF CENTRAL PORTION OF RIGHT BREAST IN FEMALE, ESTROGEN RECEPTOR NEGATIVE: ICD-10-CM

## 2022-04-06 DIAGNOSIS — E03.2 HYPOTHYROIDISM DUE TO DRUGS: ICD-10-CM

## 2022-04-06 DIAGNOSIS — Z85.118 HISTORY OF LUNG CANCER: ICD-10-CM

## 2022-04-06 DIAGNOSIS — D84.821 IMMUNODEFICIENCY DUE TO CHEMOTHERAPY: ICD-10-CM

## 2022-04-06 DIAGNOSIS — J44.9 CHRONIC OBSTRUCTIVE PULMONARY DISEASE, UNSPECIFIED COPD TYPE: ICD-10-CM

## 2022-04-06 DIAGNOSIS — C50.111 MALIGNANT NEOPLASM OF CENTRAL PORTION OF RIGHT BREAST IN FEMALE, ESTROGEN RECEPTOR NEGATIVE: ICD-10-CM

## 2022-04-06 DIAGNOSIS — I20.89 STABLE ANGINA: Chronic | ICD-10-CM

## 2022-04-06 DIAGNOSIS — Z79.899 IMMUNODEFICIENCY DUE TO CHEMOTHERAPY: ICD-10-CM

## 2022-04-06 DIAGNOSIS — E87.6 HYPOKALEMIA: ICD-10-CM

## 2022-04-06 DIAGNOSIS — T45.1X5A IMMUNODEFICIENCY DUE TO CHEMOTHERAPY: ICD-10-CM

## 2022-04-06 LAB
ALBUMIN SERPL BCP-MCNC: 3.4 G/DL (ref 3.5–5.2)
ALBUMIN SERPL BCP-MCNC: 3.7 G/DL (ref 3.5–5.2)
ALBUMIN SERPL BCP-MCNC: 3.8 G/DL (ref 3.5–5.2)
ALP SERPL-CCNC: 57 U/L (ref 55–135)
ALP SERPL-CCNC: 57 U/L (ref 55–135)
ALP SERPL-CCNC: 62 U/L (ref 55–135)
ALT SERPL W/O P-5'-P-CCNC: 10 U/L (ref 10–44)
ALT SERPL W/O P-5'-P-CCNC: 11 U/L (ref 10–44)
ALT SERPL W/O P-5'-P-CCNC: 12 U/L (ref 10–44)
ANION GAP SERPL CALC-SCNC: 10 MMOL/L (ref 8–16)
ANION GAP SERPL CALC-SCNC: 13 MMOL/L (ref 8–16)
ANION GAP SERPL CALC-SCNC: 9 MMOL/L (ref 8–16)
AST SERPL-CCNC: 13 U/L (ref 10–40)
AST SERPL-CCNC: 14 U/L (ref 10–40)
AST SERPL-CCNC: 15 U/L (ref 10–40)
BACTERIA #/AREA URNS HPF: ABNORMAL /HPF
BASOPHILS # BLD AUTO: 0.05 K/UL (ref 0–0.2)
BASOPHILS # BLD AUTO: 0.05 K/UL (ref 0–0.2)
BASOPHILS NFR BLD: 0.6 % (ref 0–1.9)
BASOPHILS NFR BLD: 0.7 % (ref 0–1.9)
BILIRUB SERPL-MCNC: 0.6 MG/DL (ref 0.1–1)
BILIRUB SERPL-MCNC: 0.7 MG/DL (ref 0.1–1)
BILIRUB SERPL-MCNC: 0.8 MG/DL (ref 0.1–1)
BILIRUB UR QL STRIP: NEGATIVE
BNP SERPL-MCNC: 131 PG/ML (ref 0–99)
BUN SERPL-MCNC: 13 MG/DL (ref 8–23)
BUN SERPL-MCNC: 14 MG/DL (ref 8–23)
BUN SERPL-MCNC: 15 MG/DL (ref 8–23)
CALCIUM SERPL-MCNC: 8.9 MG/DL (ref 8.7–10.5)
CALCIUM SERPL-MCNC: 9 MG/DL (ref 8.7–10.5)
CALCIUM SERPL-MCNC: 9.1 MG/DL (ref 8.7–10.5)
CHLORIDE SERPL-SCNC: 102 MMOL/L (ref 95–110)
CHLORIDE SERPL-SCNC: 96 MMOL/L (ref 95–110)
CHLORIDE SERPL-SCNC: 98 MMOL/L (ref 95–110)
CLARITY UR: CLEAR
CO2 SERPL-SCNC: 28 MMOL/L (ref 23–29)
CO2 SERPL-SCNC: 29 MMOL/L (ref 23–29)
CO2 SERPL-SCNC: 31 MMOL/L (ref 23–29)
COLOR UR: YELLOW
CREAT SERPL-MCNC: 1 MG/DL (ref 0.5–1.4)
CTP QC/QA: YES
DIFFERENTIAL METHOD: ABNORMAL
DIFFERENTIAL METHOD: ABNORMAL
EOSINOPHIL # BLD AUTO: 0.2 K/UL (ref 0–0.5)
EOSINOPHIL # BLD AUTO: 0.2 K/UL (ref 0–0.5)
EOSINOPHIL NFR BLD: 2.2 % (ref 0–8)
EOSINOPHIL NFR BLD: 2.7 % (ref 0–8)
ERYTHROCYTE [DISTWIDTH] IN BLOOD BY AUTOMATED COUNT: 12.9 % (ref 11.5–14.5)
ERYTHROCYTE [DISTWIDTH] IN BLOOD BY AUTOMATED COUNT: 13.1 % (ref 11.5–14.5)
EST. GFR  (AFRICAN AMERICAN): >60 ML/MIN/1.73 M^2
EST. GFR  (NON AFRICAN AMERICAN): 55 ML/MIN/1.73 M^2
GLUCOSE SERPL-MCNC: 130 MG/DL (ref 70–110)
GLUCOSE SERPL-MCNC: 139 MG/DL (ref 70–110)
GLUCOSE SERPL-MCNC: 144 MG/DL (ref 70–110)
GLUCOSE UR QL STRIP: ABNORMAL
HCT VFR BLD AUTO: 40.7 % (ref 37–48.5)
HCT VFR BLD AUTO: 41.6 % (ref 37–48.5)
HGB BLD-MCNC: 13.4 G/DL (ref 12–16)
HGB BLD-MCNC: 13.4 G/DL (ref 12–16)
HGB UR QL STRIP: NEGATIVE
HYALINE CASTS #/AREA URNS LPF: 1 /LPF
IMM GRANULOCYTES # BLD AUTO: 0.03 K/UL (ref 0–0.04)
IMM GRANULOCYTES # BLD AUTO: 0.03 K/UL (ref 0–0.04)
IMM GRANULOCYTES NFR BLD AUTO: 0.3 % (ref 0–0.5)
IMM GRANULOCYTES NFR BLD AUTO: 0.4 % (ref 0–0.5)
KETONES UR QL STRIP: NEGATIVE
LACTATE SERPL-SCNC: 1.5 MMOL/L (ref 0.5–2.2)
LEUKOCYTE ESTERASE UR QL STRIP: ABNORMAL
LIPASE SERPL-CCNC: 14 U/L (ref 4–60)
LYMPHOCYTES # BLD AUTO: 0.8 K/UL (ref 1–4.8)
LYMPHOCYTES # BLD AUTO: 1 K/UL (ref 1–4.8)
LYMPHOCYTES NFR BLD: 10 % (ref 18–48)
LYMPHOCYTES NFR BLD: 11.5 % (ref 18–48)
MAGNESIUM SERPL-MCNC: 1.8 MG/DL (ref 1.6–2.6)
MAGNESIUM SERPL-MCNC: 2.1 MG/DL (ref 1.6–2.6)
MCH RBC QN AUTO: 30 PG (ref 27–31)
MCH RBC QN AUTO: 30 PG (ref 27–31)
MCHC RBC AUTO-ENTMCNC: 32.2 G/DL (ref 32–36)
MCHC RBC AUTO-ENTMCNC: 32.9 G/DL (ref 32–36)
MCV RBC AUTO: 91 FL (ref 82–98)
MCV RBC AUTO: 93 FL (ref 82–98)
MICROSCOPIC COMMENT: ABNORMAL
MONOCYTES # BLD AUTO: 0.8 K/UL (ref 0.3–1)
MONOCYTES # BLD AUTO: 0.9 K/UL (ref 0.3–1)
MONOCYTES NFR BLD: 10.4 % (ref 4–15)
MONOCYTES NFR BLD: 10.8 % (ref 4–15)
NEUTROPHILS # BLD AUTO: 5.8 K/UL (ref 1.8–7.7)
NEUTROPHILS # BLD AUTO: 6.5 K/UL (ref 1.8–7.7)
NEUTROPHILS NFR BLD: 75 % (ref 38–73)
NEUTROPHILS NFR BLD: 75.4 % (ref 38–73)
NITRITE UR QL STRIP: NEGATIVE
NRBC BLD-RTO: 0 /100 WBC
NRBC BLD-RTO: 0 /100 WBC
PH UR STRIP: 7 [PH] (ref 5–8)
PHOSPHATE SERPL-MCNC: 3.1 MG/DL (ref 2.7–4.5)
PLATELET # BLD AUTO: 256 K/UL (ref 150–450)
PLATELET # BLD AUTO: 274 K/UL (ref 150–450)
PMV BLD AUTO: 9.1 FL (ref 9.2–12.9)
PMV BLD AUTO: 9.8 FL (ref 9.2–12.9)
POTASSIUM SERPL-SCNC: 2.6 MMOL/L (ref 3.5–5.1)
POTASSIUM SERPL-SCNC: 2.8 MMOL/L (ref 3.5–5.1)
POTASSIUM SERPL-SCNC: 3.7 MMOL/L (ref 3.5–5.1)
PROT SERPL-MCNC: 6.9 G/DL (ref 6–8.4)
PROT SERPL-MCNC: 7.3 G/DL (ref 6–8.4)
PROT SERPL-MCNC: 7.8 G/DL (ref 6–8.4)
PROT UR QL STRIP: ABNORMAL
RBC # BLD AUTO: 4.46 M/UL (ref 4–5.4)
RBC # BLD AUTO: 4.46 M/UL (ref 4–5.4)
RBC #/AREA URNS HPF: 6 /HPF (ref 0–4)
SARS-COV-2 RDRP RESP QL NAA+PROBE: NEGATIVE
SODIUM SERPL-SCNC: 138 MMOL/L (ref 136–145)
SODIUM SERPL-SCNC: 139 MMOL/L (ref 136–145)
SODIUM SERPL-SCNC: 139 MMOL/L (ref 136–145)
SP GR UR STRIP: 1.01 (ref 1–1.03)
SQUAMOUS #/AREA URNS HPF: 2 /HPF
TROPONIN I SERPL DL<=0.01 NG/ML-MCNC: 0.02 NG/ML (ref 0–0.03)
TSH SERPL DL<=0.005 MIU/L-ACNC: 1.6 UIU/ML (ref 0.4–4)
URN SPEC COLLECT METH UR: ABNORMAL
UROBILINOGEN UR STRIP-ACNC: NEGATIVE EU/DL
WBC # BLD AUTO: 7.69 K/UL (ref 3.9–12.7)
WBC # BLD AUTO: 8.72 K/UL (ref 3.9–12.7)
WBC #/AREA URNS HPF: 73 /HPF (ref 0–5)
WBC CLUMPS URNS QL MICRO: ABNORMAL

## 2022-04-06 PROCEDURE — 1125F AMNT PAIN NOTED PAIN PRSNT: CPT | Mod: CPTII,S$GLB,, | Performed by: INTERNAL MEDICINE

## 2022-04-06 PROCEDURE — 85025 COMPLETE CBC W/AUTO DIFF WBC: CPT | Performed by: EMERGENCY MEDICINE

## 2022-04-06 PROCEDURE — 99215 OFFICE O/P EST HI 40 MIN: CPT | Mod: S$GLB,,, | Performed by: INTERNAL MEDICINE

## 2022-04-06 PROCEDURE — G0378 HOSPITAL OBSERVATION PER HR: HCPCS

## 2022-04-06 PROCEDURE — 1157F PR ADVANCE CARE PLAN OR EQUIV PRESENT IN MEDICAL RECORD: ICD-10-PCS | Mod: CPTII,S$GLB,, | Performed by: INTERNAL MEDICINE

## 2022-04-06 PROCEDURE — 1101F PT FALLS ASSESS-DOCD LE1/YR: CPT | Mod: CPTII,S$GLB,, | Performed by: INTERNAL MEDICINE

## 2022-04-06 PROCEDURE — 80053 COMPREHEN METABOLIC PANEL: CPT | Mod: 91 | Performed by: INTERNAL MEDICINE

## 2022-04-06 PROCEDURE — 99285 EMERGENCY DEPT VISIT HI MDM: CPT | Mod: 25

## 2022-04-06 PROCEDURE — 93010 ELECTROCARDIOGRAM REPORT: CPT | Mod: ,,, | Performed by: INTERNAL MEDICINE

## 2022-04-06 PROCEDURE — 63600175 PHARM REV CODE 636 W HCPCS: Performed by: EMERGENCY MEDICINE

## 2022-04-06 PROCEDURE — 3288F PR FALLS RISK ASSESSMENT DOCUMENTED: ICD-10-PCS | Mod: CPTII,S$GLB,, | Performed by: INTERNAL MEDICINE

## 2022-04-06 PROCEDURE — 93010 EKG 12-LEAD: ICD-10-PCS | Mod: ,,, | Performed by: INTERNAL MEDICINE

## 2022-04-06 PROCEDURE — 1101F PR PT FALLS ASSESS DOC 0-1 FALLS W/OUT INJ PAST YR: ICD-10-PCS | Mod: CPTII,S$GLB,, | Performed by: INTERNAL MEDICINE

## 2022-04-06 PROCEDURE — 99499 RISK ADDL DX/OHS AUDIT: ICD-10-PCS | Mod: S$GLB,,, | Performed by: INTERNAL MEDICINE

## 2022-04-06 PROCEDURE — 36415 COLL VENOUS BLD VENIPUNCTURE: CPT | Performed by: INTERNAL MEDICINE

## 2022-04-06 PROCEDURE — 96367 TX/PROPH/DG ADDL SEQ IV INF: CPT

## 2022-04-06 PROCEDURE — 63600175 PHARM REV CODE 636 W HCPCS: Performed by: NURSE PRACTITIONER

## 2022-04-06 PROCEDURE — 3288F FALL RISK ASSESSMENT DOCD: CPT | Mod: CPTII,S$GLB,, | Performed by: INTERNAL MEDICINE

## 2022-04-06 PROCEDURE — 84100 ASSAY OF PHOSPHORUS: CPT | Performed by: EMERGENCY MEDICINE

## 2022-04-06 PROCEDURE — U0002 COVID-19 LAB TEST NON-CDC: HCPCS | Performed by: EMERGENCY MEDICINE

## 2022-04-06 PROCEDURE — 3075F PR MOST RECENT SYSTOLIC BLOOD PRESS GE 130-139MM HG: ICD-10-PCS | Mod: CPTII,S$GLB,, | Performed by: INTERNAL MEDICINE

## 2022-04-06 PROCEDURE — 80053 COMPREHEN METABOLIC PANEL: CPT | Mod: 91 | Performed by: EMERGENCY MEDICINE

## 2022-04-06 PROCEDURE — 25000003 PHARM REV CODE 250: Performed by: NURSE PRACTITIONER

## 2022-04-06 PROCEDURE — 85025 COMPLETE CBC W/AUTO DIFF WBC: CPT | Mod: 91 | Performed by: INTERNAL MEDICINE

## 2022-04-06 PROCEDURE — 1157F ADVNC CARE PLAN IN RCRD: CPT | Mod: CPTII,S$GLB,, | Performed by: INTERNAL MEDICINE

## 2022-04-06 PROCEDURE — 83735 ASSAY OF MAGNESIUM: CPT | Mod: 91 | Performed by: EMERGENCY MEDICINE

## 2022-04-06 PROCEDURE — 3078F DIAST BP <80 MM HG: CPT | Mod: CPTII,S$GLB,, | Performed by: INTERNAL MEDICINE

## 2022-04-06 PROCEDURE — 83690 ASSAY OF LIPASE: CPT | Performed by: EMERGENCY MEDICINE

## 2022-04-06 PROCEDURE — 83605 ASSAY OF LACTIC ACID: CPT | Performed by: EMERGENCY MEDICINE

## 2022-04-06 PROCEDURE — 99999 PR PBB SHADOW E&M-EST. PATIENT-LVL IV: CPT | Mod: PBBFAC,,, | Performed by: INTERNAL MEDICINE

## 2022-04-06 PROCEDURE — 99215 PR OFFICE/OUTPT VISIT, EST, LEVL V, 40-54 MIN: ICD-10-PCS | Mod: S$GLB,,, | Performed by: INTERNAL MEDICINE

## 2022-04-06 PROCEDURE — 87086 URINE CULTURE/COLONY COUNT: CPT | Performed by: EMERGENCY MEDICINE

## 2022-04-06 PROCEDURE — 83735 ASSAY OF MAGNESIUM: CPT | Performed by: NURSE PRACTITIONER

## 2022-04-06 PROCEDURE — 25000003 PHARM REV CODE 250: Performed by: EMERGENCY MEDICINE

## 2022-04-06 PROCEDURE — 99499 UNLISTED E&M SERVICE: CPT | Mod: S$GLB,,, | Performed by: INTERNAL MEDICINE

## 2022-04-06 PROCEDURE — 99999 PR PBB SHADOW E&M-EST. PATIENT-LVL IV: ICD-10-PCS | Mod: PBBFAC,,, | Performed by: INTERNAL MEDICINE

## 2022-04-06 PROCEDURE — 80053 COMPREHEN METABOLIC PANEL: CPT | Performed by: NURSE PRACTITIONER

## 2022-04-06 PROCEDURE — 3075F SYST BP GE 130 - 139MM HG: CPT | Mod: CPTII,S$GLB,, | Performed by: INTERNAL MEDICINE

## 2022-04-06 PROCEDURE — 3078F PR MOST RECENT DIASTOLIC BLOOD PRESSURE < 80 MM HG: ICD-10-PCS | Mod: CPTII,S$GLB,, | Performed by: INTERNAL MEDICINE

## 2022-04-06 PROCEDURE — 84484 ASSAY OF TROPONIN QUANT: CPT | Performed by: EMERGENCY MEDICINE

## 2022-04-06 PROCEDURE — 81000 URINALYSIS NONAUTO W/SCOPE: CPT | Performed by: EMERGENCY MEDICINE

## 2022-04-06 PROCEDURE — 84443 ASSAY THYROID STIM HORMONE: CPT | Performed by: INTERNAL MEDICINE

## 2022-04-06 PROCEDURE — 1125F PR PAIN SEVERITY QUANTIFIED, PAIN PRESENT: ICD-10-PCS | Mod: CPTII,S$GLB,, | Performed by: INTERNAL MEDICINE

## 2022-04-06 PROCEDURE — 83880 ASSAY OF NATRIURETIC PEPTIDE: CPT | Performed by: NURSE PRACTITIONER

## 2022-04-06 PROCEDURE — 96365 THER/PROPH/DIAG IV INF INIT: CPT

## 2022-04-06 PROCEDURE — 96366 THER/PROPH/DIAG IV INF ADDON: CPT

## 2022-04-06 PROCEDURE — 93005 ELECTROCARDIOGRAM TRACING: CPT

## 2022-04-06 RX ORDER — POTASSIUM CHLORIDE 7.45 MG/ML
10 INJECTION INTRAVENOUS
Status: COMPLETED | OUTPATIENT
Start: 2022-04-06 | End: 2022-04-06

## 2022-04-06 RX ORDER — ACETAMINOPHEN 325 MG/1
650 TABLET ORAL EVERY 4 HOURS PRN
Status: DISCONTINUED | OUTPATIENT
Start: 2022-04-06 | End: 2022-04-07 | Stop reason: HOSPADM

## 2022-04-06 RX ORDER — BENZONATATE 100 MG/1
100 CAPSULE ORAL 3 TIMES DAILY PRN
Status: DISCONTINUED | OUTPATIENT
Start: 2022-04-06 | End: 2022-04-07 | Stop reason: HOSPADM

## 2022-04-06 RX ORDER — POTASSIUM CHLORIDE 20 MEQ/1
40 TABLET, EXTENDED RELEASE ORAL ONCE
Status: COMPLETED | OUTPATIENT
Start: 2022-04-06 | End: 2022-04-06

## 2022-04-06 RX ORDER — DEXTROMETHORPHAN HYDROBROMIDE, GUAIFENESIN 5; 100 MG/5ML; MG/5ML
650 LIQUID ORAL EVERY 8 HOURS
COMMUNITY

## 2022-04-06 RX ORDER — SODIUM CHLORIDE AND POTASSIUM CHLORIDE 150; 900 MG/100ML; MG/100ML
INJECTION, SOLUTION INTRAVENOUS CONTINUOUS
Status: DISCONTINUED | OUTPATIENT
Start: 2022-04-06 | End: 2022-04-07

## 2022-04-06 RX ORDER — POTASSIUM CHLORIDE 20 MEQ/1
40 TABLET, EXTENDED RELEASE ORAL
Status: COMPLETED | OUTPATIENT
Start: 2022-04-06 | End: 2022-04-06

## 2022-04-06 RX ORDER — MAGNESIUM SULFATE 1 G/100ML
1 INJECTION INTRAVENOUS
Status: COMPLETED | OUTPATIENT
Start: 2022-04-06 | End: 2022-04-06

## 2022-04-06 RX ORDER — SODIUM CHLORIDE 0.9 % (FLUSH) 0.9 %
10 SYRINGE (ML) INJECTION
Status: DISCONTINUED | OUTPATIENT
Start: 2022-04-06 | End: 2022-04-07 | Stop reason: HOSPADM

## 2022-04-06 RX ORDER — IPRATROPIUM BROMIDE AND ALBUTEROL SULFATE 2.5; .5 MG/3ML; MG/3ML
3 SOLUTION RESPIRATORY (INHALATION) EVERY 6 HOURS PRN
Status: DISCONTINUED | OUTPATIENT
Start: 2022-04-06 | End: 2022-04-07 | Stop reason: HOSPADM

## 2022-04-06 RX ADMIN — POTASSIUM CHLORIDE 40 MEQ: 20 TABLET, EXTENDED RELEASE ORAL at 12:04

## 2022-04-06 RX ADMIN — POTASSIUM CHLORIDE 10 MEQ: 7.46 INJECTION, SOLUTION INTRAVENOUS at 05:04

## 2022-04-06 RX ADMIN — POTASSIUM CHLORIDE 40 MEQ: 20 TABLET, EXTENDED RELEASE ORAL at 10:04

## 2022-04-06 RX ADMIN — POTASSIUM CHLORIDE 10 MEQ: 7.46 INJECTION, SOLUTION INTRAVENOUS at 03:04

## 2022-04-06 RX ADMIN — MAGNESIUM SULFATE 1 G: 1 INJECTION INTRAVENOUS at 10:04

## 2022-04-06 RX ADMIN — BENZONATATE 100 MG: 100 CAPSULE ORAL at 07:04

## 2022-04-06 RX ADMIN — POTASSIUM CHLORIDE 10 MEQ: 7.46 INJECTION, SOLUTION INTRAVENOUS at 12:04

## 2022-04-06 NOTE — H&P
Reviewed results, would like to see a neurologist. Order placed for same.    [facility]  [facility]  History & Physical    Patient Name: Ade Castellano  MRN: [id number]  Patient Class: OP- Observation  Admission Date: 4/6/2022  Attending Physician: Missy Sigala MD   Primary Care Provider: Jeannine Huitron MD         Patient information was obtained from patient and ER records.     Subjective:     Principal Problem:Hypokalemia    Chief Complaint:   Chief Complaint   Patient presents with    Abnormal Lab    Shortness of Breath    Weakness     Patient presented to the ED via clinic staff escort. Clinic staff member states that patient had a critical low potassium result this morning and patient is having generalized weakness and sob x 5 days.        HPI: Ade Castellano is a 77 yo female with significant history for hypertension, COPD not on home oxygen, quit smoking 2018, history of stage IV cancer squamous cell lung cancer and right breast cancer status post systemic chemotherapy with Mets to lymph node currently followed by Dr. Holliday who was sent to the hospital for low potassium.  Patient was supposed to receive Keytruda infusion today. Patient also report weakness and decreased or intake.  Patient was also found to be orthostatic in ED.  Denies headache dizziness chest pain shortness of breath.  EKG SB 59.       Past Medical History:   Diagnosis Date    Abnormal stress test 8/5/2020    Acute respiratory failure with hypoxia and hypercapnia 11/29/2017    Angina pectoris     Arthritis     Bell's palsy     left facial weakness    Breast cancer     RIGHT    CAD (coronary artery disease)     Cervical cancer     Chronic bronchitis     Chronic heart failure with preserved ejection fraction 8/5/2020    Chronic heart failure with preserved ejection fraction 8/5/2020    COPD (chronic obstructive pulmonary disease)     Dr. Katz    Dental bridge present     Emphysema of lung     H/O colonoscopy 06/2017    due for repeat colonsocopy in 6/2018    History  of Bell's palsy     History of heart artery stent     Dr. Ortiz  x2 stents    Hyperlipidemia     Hypertension     Lung cancer     Myocardial infarction     SUNITHA (obstructive sleep apnea)     intolerant to mask    SUNITHA (obstructive sleep apnea)     intolerant to mask     Pneumonia     Pneumonia due to other staphylococcus     PUD (peptic ulcer disease)     Severe anemia 6/11/2018    Sleep apnea     Vaginal delivery     x1       Past Surgical History:   Procedure Laterality Date    ADENOIDECTOMY      BREAST BIOPSY Right     x3    BREAST LUMPECTOMY      BREAST SURGERY      lumpectomy right side     CERVIX SURGERY      cone    COLONOSCOPY N/A 3/17/2017    Procedure: COLONOSCOPY;  Surgeon: Julio Rudd MD;  Location: [facility] ENDO;  Service: Endoscopy;  Laterality: N/A;    COLONOSCOPY N/A 6/30/2017    Procedure: COLONOSCOPY;  Surgeon: Julio Rudd MD;  Location: [facility] ENDO;  Service: Endoscopy;  Laterality: N/A;    COLONOSCOPY N/A 11/28/2018    Procedure: COLONOSCOPY;  Surgeon: Nura Urbina MD;  Location: [facility] ENDO;  Service: Endoscopy;  Laterality: N/A;    COLONOSCOPY N/A 1/29/2019    Procedure: COLONOSCOPY;  Surgeon: Emmanuel Perez MD;  Location: [facility] ENDO;  Service: Endoscopy;  Laterality: N/A;  confirmed appt-SP    CORONARY ANGIOPLASTY WITH STENT PLACEMENT      ESOPHAGOGASTRODUODENOSCOPY N/A 1/25/2021    Procedure: EGD (ESOPHAGOGASTRODUODENOSCOPY);  Surgeon: Dmitry Gonzalez MD;  Location: [facility] ENDO;  Service: Endoscopy;  Laterality: N/A;  rapid test >50 miles -ml    EYE SURGERY Bilateral 06/08/2018    cataract     LEFT HEART CATHETERIZATION Left 8/13/2020    Procedure: Left heart cath, rra, noon;  Surgeon: Guevara Skelton MD;  Location: [facility] CATH LAB;  Service: Cardiology;  Laterality: Left;  RN PREOP 8/7/2020---COVID NEGATIVE ON 8/12    PORTACATH PLACEMENT Right 01/2018    sweat glands axillary regions Bilateral     TONSILLECTOMY         Review of patient's allergies indicates:  No Known  Allergies    No current facility-administered medications on file prior to encounter.     Current Outpatient Medications on File Prior to Encounter   Medication Sig    acetaminophen (TYLENOL ARTHRITIS PAIN) 650 MG TbSR Take 650 mg by mouth every 8 (eight) hours.    albuterol (PROVENTIL) 2.5 mg /3 mL (0.083 %) nebulizer solution NEBULIZE 1 vial EVERY 6 HOURS AS NEEDED FOR WHEEZING    albuterol (VENTOLIN HFA) 90 mcg/actuation inhaler INHALE 2 PUFF(S) BY MOUTH EVERY 4 - 6 HOURS AS NEEDED FOR SHORTNESS OF BREATH or WHEEZING    aspirin (ECOTRIN) 81 MG EC tablet Take 81 mg by mouth once daily.     carboxymethylcellulose (REFRESH PLUS) 0.5 % Dpet 1 drop 3 (three) times daily as needed.    fluticasone propionate (FLONASE) 50 mcg/actuation nasal spray USE TWO SPRAYS IN EACH NOSTRIL ONCE A DAY    isosorbide mononitrate (IMDUR) 30 MG 24 hr tablet Take 1 tablet (30 mg total) by mouth once daily.    KEYTRUDA 25 mg/mL Soln Inject into the vein every 21 days.    meclizine (ANTIVERT) 25 mg tablet CHEW and SWALLOW 1 TABLET(S) BY MOUTH EVERY 6 HOURS AS NEEDED FOR DIZZINESS    metoprolol tartrate (LOPRESSOR) 25 MG tablet TAKE ONE TABLET BY MOUTH TWICE DAILY FOR BLOOD PRESSURE    nitroGLYCERIN (NITROSTAT) 0.4 MG SL tablet place 1 tablet under the tongue AS NEEDED. no more than 3 in 15 minutes    omeprazole (PRILOSEC) 40 MG capsule Take 40 mg by mouth once daily.    rosuvastatin (CRESTOR) 10 MG tablet TAKE 1 TABLET BY MOUTH EVERY DAY (Patient taking differently: Take 10 mg by mouth every evening.)    TRELEGY ELLIPTA 100-62.5-25 mcg DsDv INHALE ONE PUFF INTO THE LUNGS ONCE A DAY    heparin, porcine, PF, (HEPARIN FLUSH 100 UNITS/ML) 100 unit/mL Syrg     [DISCONTINUED] montelukast (SINGULAIR) 10 mg tablet TAKE 1 TABLET(S) BY MOUTH EVERY EVENING     Family History       Problem Relation (Age of Onset)    Breast cancer Mother    COPD Sister    Cancer Mother, Father, Sister, Maternal Grandmother, Maternal Grandfather, Paternal  Grandmother, Paternal Grandfather, Sister    Heart attack Sister    Heart disease Mother, Maternal Grandmother, Maternal Grandfather, Sister    Kidney cancer Son          Tobacco Use    Smoking status: Former Smoker     Packs/day: 0.25     Years: 50.00     Pack years: 12.50     Types: Cigarettes     Quit date: 2017     Years since quittin.4    Smokeless tobacco: Never Used   Substance and Sexual Activity    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 Negative for diaphoresis, fever and unexpected weight change.   HENT: Negative.     Respiratory: Negative.     Cardiovascular: Negative.    Gastrointestinal: Negative.    Endocrine: Negative.    Genitourinary: Negative.    Musculoskeletal: Negative.    Neurological:  Positive for weakness.   Hematological: Negative.    Objective:     Vital Signs (Most Recent):  Temp: 98.7 °F (37.1 °C) (22 0902)  Pulse: 66 (22 1445)  Resp: 14 (22 1445)  BP: (!) 100/52 (22 1445)  SpO2: 97 % (22 1445)   Vital Signs (24h Range):  Temp:  [98 °F (36.7 °C)-98.7 °F (37.1 °C)] 98.7 °F (37.1 °C)  Pulse:  [56-66] 66  Resp:  [14-20] 14  SpO2:  [93 %-97 %] 97 %  BP: ()/(51-68) 100/52     Weight: 74.8 kg (165 lb)  Body mass index is 29.23 kg/m².    Physical Exam  Constitutional:       Appearance: Normal appearance.   HENT:      Nose: Nose normal.      Mouth/Throat:      Mouth: Mucous membranes are moist.   Eyes:      Extraocular Movements: Extraocular movements intact.   Cardiovascular:      Rate and Rhythm: Regular rhythm. Bradycardia present.      Pulses: Normal pulses.   Pulmonary:      Breath sounds: Normal breath sounds.   Abdominal:      Palpations: Abdomen is soft.   Musculoskeletal:         General: No swelling or tenderness. Normal range of motion.   Skin:     General: Skin is warm and dry.    Neurological:      General: No focal deficit present.      Mental Status: She is alert and oriented to person, place, and time.   Psychiatric:         Mood and Affect: Mood normal.           Significant Labs: All pertinent labs within the past 24 hours have been reviewed.    Significant Imaging: I have reviewed all pertinent imaging results/findings within the past 24 hours.    Assessment/Plan:     * Hypokalemia  Due to poor intake  EKG SB 59 okay  Replace K 4 and Mg 2      Orthostatic hypotension  Decrease oral intake  Hold metoprolol and and imdur      Generalized weakness  Volume and K Mg repletion  Good strength in all extremities      Stage 3 severe COPD by GOLD classification  No wheezing on exam. Breathing comfortably on RA  Quit smoking 2008  Duoneb prn      Chronic heart failure with preserved ejection fraction  Volume depleted currently. Gentle IVF       Metastatic breast cancer  Followed by Dr. Holliday- suppose to received Keytruda infusion today.       Squamous cell carcinoma lung  Per patient , in remission ? Dr. Holliday following. Currently on Keytruda for breast ca met to lymph node      Prediabetes  Lab Results   Component Value Date    HGBA1C 6.0 (H) 04/05/2021   Repeat HgbA1c          VTE Risk Mitigation (From admission, onward)         Ordered     IP VTE HIGH RISK PATIENT  Once         04/06/22 1112     Place sequential compression device  Until discontinued         04/06/22 1112               As clarification on 4/6/22, patient admit to hospital under my care in collaboration with Dr. Missy Miranda.   Araceli Rosenberg NP  Department of Hospital Medicine   [facility] - Emergency Dept

## 2022-04-06 NOTE — PHARMACY MED REC
"Admission Medication History     The home medication history was taken by Faye Velez CPhT.    You may go to "Admission" then "Reconcile Home Medications" tabs to review and/or act upon these items.      The home medication list has been updated by the Pharmacy department.    Please read ALL comments highlighted in yellow.    Please address this information as you see fit.     Feel free to contact us if you have any questions or require assistance.          Medications listed below were obtained from: Patient/family and Analytic software- [employer]  (Not in a hospital admission)          Faye Velez CPhT.  [phone]                    .          "

## 2022-04-06 NOTE — ED PROVIDER NOTES
Encounter Date: 4/6/2022       History     Chief Complaint   Patient presents with    Abnormal Lab    Shortness of Breath    Weakness     Patient presented to the ED via clinic staff escort. Clinic staff member states that patient had a critical low potassium result this morning and patient is having generalized weakness and sob x 5 days.     HPI     76-year-old female with past medical history of CAD, cervical cancer, breast cancer, hypertension, SUNITHA presents with weakness, fatigue, concern for low potassium.  Patient reports having her blood drawn today as normal lab as she has been extremely weak lately, is currently undergoing treatment directed by her oncologist, Dr. Mg schroeder.  She reports no lightheadedness or dizziness however just feels incredibly fatigued.  She reports getting called her potassium was critically low as this is happened to her previously.  Denies any chest pain, shortness of breath, numbness or tingling, abdominal pain, nausea/vomiting/diarrhea, or any further complaint.    Review of patient's allergies indicates:  No Known Allergies  Past Medical History:   Diagnosis Date    Abnormal stress test 8/5/2020    Acute respiratory failure with hypoxia and hypercapnia 11/29/2017    Angina pectoris     Arthritis     Bell's palsy     left facial weakness    Breast cancer     RIGHT    CAD (coronary artery disease)     Cervical cancer     Chronic bronchitis     Chronic heart failure with preserved ejection fraction 8/5/2020    Chronic heart failure with preserved ejection fraction 8/5/2020    COPD (chronic obstructive pulmonary disease)     Dr. Katz    Dental bridge present     Emphysema of lung     H/O colonoscopy 06/2017    due for repeat colonsocopy in 6/2018    History of Bell's palsy     History of heart artery stent     Dr. Ortiz  x2 stents    Hyperlipidemia     Hypertension     Lung cancer     Myocardial infarction     SUNITHA (obstructive sleep apnea)     intolerant to  mask    SUNITHA (obstructive sleep apnea)     intolerant to mask     Pneumonia     Pneumonia due to other staphylococcus     PUD (peptic ulcer disease)     Severe anemia 6/11/2018    Sleep apnea     Vaginal delivery     x1     Past Surgical History:   Procedure Laterality Date    ADENOIDECTOMY      BREAST BIOPSY Right     x3    BREAST LUMPECTOMY      BREAST SURGERY      lumpectomy right side     CERVIX SURGERY      cone    COLONOSCOPY N/A 3/17/2017    Procedure: COLONOSCOPY;  Surgeon: Julio Rudd MD;  Location: [facility] ENDO;  Service: Endoscopy;  Laterality: N/A;    COLONOSCOPY N/A 6/30/2017    Procedure: COLONOSCOPY;  Surgeon: Julio Rudd MD;  Location: [facility] ENDO;  Service: Endoscopy;  Laterality: N/A;    COLONOSCOPY N/A 11/28/2018    Procedure: COLONOSCOPY;  Surgeon: Nura Urbina MD;  Location: [facility] ENDO;  Service: Endoscopy;  Laterality: N/A;    COLONOSCOPY N/A 1/29/2019    Procedure: COLONOSCOPY;  Surgeon: Emmanuel Perez MD;  Location: [facility] ENDO;  Service: Endoscopy;  Laterality: N/A;  confirmed appt-SP    CORONARY ANGIOPLASTY WITH STENT PLACEMENT      ESOPHAGOGASTRODUODENOSCOPY N/A 1/25/2021    Procedure: EGD (ESOPHAGOGASTRODUODENOSCOPY);  Surgeon: Dmitry Gonzalez MD;  Location: [facility] ENDO;  Service: Endoscopy;  Laterality: N/A;  rapid test >50 miles -ml    EYE SURGERY Bilateral 06/08/2018    cataract     LEFT HEART CATHETERIZATION Left 8/13/2020    Procedure: Left heart cath, rra, noon;  Surgeon: Guevara Skelton MD;  Location: [facility] CATH LAB;  Service: Cardiology;  Laterality: Left;  RN PREOP 8/7/2020---COVID NEGATIVE ON 8/12    PORTACATH PLACEMENT Right 01/2018    sweat glands axillary regions Bilateral     TONSILLECTOMY       Family History   Problem Relation Age of Onset    Cancer Mother         breast    Heart disease Mother     Breast cancer Mother     Cancer Father         lung-smoker     Cancer Sister         lung-smoker     Heart attack Sister     Cancer Maternal Grandmother      Heart disease Maternal Grandmother     Cancer Maternal Grandfather     Heart disease Maternal Grandfather     Cancer Paternal Grandmother     Cancer Paternal Grandfather     Cancer Sister         mets not sure where it started     COPD Sister     Heart disease Sister     Kidney cancer Son     Colon cancer Neg Hx     Esophageal cancer Neg Hx      Social History     Tobacco Use    Smoking status: Former Smoker     Packs/day: 0.25     Years: 50.00     Pack years: 12.50     Types: Cigarettes     Quit date: 2017     Years since quittin.4    Smokeless tobacco: Never Used   Substance Use Topics    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   HENT: Negative.    Eyes: Negative.    Respiratory: Negative.    Cardiovascular: Negative.    Gastrointestinal: Negative.    Genitourinary: Negative.    Musculoskeletal: Negative.    Skin: Negative.    Neurological: Positive for weakness.       Physical Exam     Initial Vitals [22 0902]   BP Pulse Resp Temp SpO2   (!) 121/58 62 18 98.7 °F (37.1 °C) 96 %      MAP       --         Physical Exam    Nursing note and vitals reviewed.  Constitutional: She appears well-developed and well-nourished. She is not diaphoretic. No distress.   HENT:   Head: Normocephalic and atraumatic.   Nose: Nose normal.   Eyes: EOM are normal. Pupils are equal, round, and reactive to light.   Neck: Neck supple. No JVD present.   Normal range of motion.  Cardiovascular: Normal rate and regular rhythm.   Pulmonary/Chest: Breath sounds normal. No stridor. No respiratory distress. She has no wheezes. She has no rhonchi. She has no rales.   Abdominal: Abdomen is soft. Bowel sounds are normal. She exhibits no distension. There is no abdominal tenderness.   Musculoskeletal:         General: No tenderness or edema. Normal range of motion.      Cervical back: Normal range of motion and neck supple.      Neurological: She is alert and oriented to person, place, and time. She has normal strength.   Skin: Skin is warm and dry. Capillary refill takes less than 2 seconds. No rash noted. No erythema.         ED Course   Procedures  Labs Reviewed   CBC W/ AUTO DIFFERENTIAL - Abnormal; Notable for the following components:       Result Value    Gran % 75.0 (*)     Lymph % 11.5 (*)     All other components within normal limits   COMPREHENSIVE METABOLIC PANEL - Abnormal; Notable for the following components:    Potassium 2.8 (*)     Glucose 130 (*)     eGFR if non  55 (*)     All other components within normal limits   URINALYSIS, REFLEX TO URINE CULTURE - Abnormal; Notable for the following components:    Protein, UA 1+ (*)     Glucose, UA Trace (*)     Leukocytes, UA 2+ (*)     All other components within normal limits    Narrative:     Specimen Source->Urine   URINALYSIS MICROSCOPIC - Abnormal; Notable for the following components:    RBC, UA 6 (*)     WBC, UA 73 (*)     All other components within normal limits    Narrative:     Specimen Source->Urine   CULTURE, URINE   TROPONIN I   MAGNESIUM   PHOSPHORUS   LIPASE   LACTIC ACID, PLASMA   B-TYPE NATRIURETIC PEPTIDE   SARS-COV-2 RDRP GENE          Imaging Results    None          Medications   sodium chloride 0.9% flush 10 mL (has no administration in time range)   acetaminophen tablet 650 mg (has no administration in time range)   0.9 % NaCl with KCl 20 mEq infusion ( Intravenous Incomplete 4/6/22 1227)   albuterol-ipratropium 2.5 mg-0.5 mg/3 mL nebulizer solution 3 mL (has no administration in time range)   benzonatate capsule 100 mg (100 mg Oral Given 4/6/22 1944)   magnesium sulfate in dextrose IVPB (premix) 1 g (0 g Intravenous Stopped 4/6/22 1128)   potassium chloride SA CR tablet 40 mEq (40 mEq Oral Given 4/6/22 1029)   potassium chloride SA CR tablet 40 mEq (40 mEq Oral Given 4/6/22 1215)   potassium chloride 10 mEq in 100 mL IVPB (10 mEq  Intravenous New Bag 4/6/22 1713)              MDM:      76-year-old female with past medical history of CAD, cervical cancer, breast cancer, hypertension, SUNITHA presents with weakness, fatigue, concern for low potassium.  EKG with sinus bradycardia rate of 59 beats per minute, some nonspecific ST and T-wave changes noted diffusely, similar to prior EKG morphology, QTC 453ms, no STEMI.  Magnesium 1.8, K 2.8, lactate 1.5, lipase 14.  Patient presentation consistent with hypokalemia, symptomatic, patient given IV Mag, oral potassium and will place in observation for continued management and replacement. Discussed diagnosis and further treatment with patient and bedside. All questions answered, patient transferred to floor improved and stable.              ED Course as of 04/06/22 2044 Wed Apr 06, 2022   0941 EKG-935-sinus bradycardia rate of 59 beats per minute, nonspecific ST T-wave changes noted, similar in morphology to prior EKG, QTC 453ms, no STEMI. [BB]      ED Course User Index  [BB] Papo Stuart MD             Clinical Impression:   Final diagnoses:  [E87.6] Hypokalemia          ED Disposition Condition    Observation               Papo Stuart MD  04/06/22 2044

## 2022-04-06 NOTE — ASSESSMENT & PLAN NOTE
Per patient , in remission ? Dr. Holliday following. Currently on Keytruda for breast ca met to lymph node

## 2022-04-06 NOTE — SUBJECTIVE & OBJECTIVE
Past Medical History:   Diagnosis Date    Abnormal stress test 8/5/2020    Acute respiratory failure with hypoxia and hypercapnia 11/29/2017    Angina pectoris     Arthritis     Bell's palsy     left facial weakness    Breast cancer     RIGHT    CAD (coronary artery disease)     Cervical cancer     Chronic bronchitis     Chronic heart failure with preserved ejection fraction 8/5/2020    Chronic heart failure with preserved ejection fraction 8/5/2020    COPD (chronic obstructive pulmonary disease)     Dr. Katz    Dental bridge present     Emphysema of lung     H/O colonoscopy 06/2017    due for repeat colonsocopy in 6/2018    History of Bell's palsy     History of heart artery stent     Dr. Ortiz  x2 stents    Hyperlipidemia     Hypertension     Lung cancer     Myocardial infarction     SUNITHA (obstructive sleep apnea)     intolerant to mask    SUNITHA (obstructive sleep apnea)     intolerant to mask     Pneumonia     Pneumonia due to other staphylococcus     PUD (peptic ulcer disease)     Severe anemia 6/11/2018    Sleep apnea     Vaginal delivery     x1       Past Surgical History:   Procedure Laterality Date    ADENOIDECTOMY      BREAST BIOPSY Right     x3    BREAST LUMPECTOMY      BREAST SURGERY      lumpectomy right side     CERVIX SURGERY      cone    COLONOSCOPY N/A 3/17/2017    Procedure: COLONOSCOPY;  Surgeon: Julio Rudd MD;  Location: [facility] ENDO;  Service: Endoscopy;  Laterality: N/A;    COLONOSCOPY N/A 6/30/2017    Procedure: COLONOSCOPY;  Surgeon: Julio Rudd MD;  Location: [facility] ENDO;  Service: Endoscopy;  Laterality: N/A;    COLONOSCOPY N/A 11/28/2018    Procedure: COLONOSCOPY;  Surgeon: Nura Urbina MD;  Location: [facility] ENDO;  Service: Endoscopy;  Laterality: N/A;    COLONOSCOPY N/A 1/29/2019    Procedure: COLONOSCOPY;  Surgeon: Emmanuel Perez MD;  Location: [facility];  Service: Endoscopy;  Laterality: N/A;  confirmed appt-SP    CORONARY ANGIOPLASTY WITH STENT PLACEMENT      ESOPHAGOGASTRODUODENOSCOPY  N/A 1/25/2021    Procedure: EGD (ESOPHAGOGASTRODUODENOSCOPY);  Surgeon: Dmitry Gonzalez MD;  Location: [facility] ENDO;  Service: Endoscopy;  Laterality: N/A;  rapid test >50 miles -ml    EYE SURGERY Bilateral 06/08/2018    cataract     LEFT HEART CATHETERIZATION Left 8/13/2020    Procedure: Left heart cath, rra, noon;  Surgeon: Guevara Skelton MD;  Location: [facility] CATH LAB;  Service: Cardiology;  Laterality: Left;  RN PREOP 8/7/2020---COVID NEGATIVE ON 8/12    PORTACATH PLACEMENT Right 01/2018    sweat glands axillary regions Bilateral     TONSILLECTOMY         Review of patient's allergies indicates:  No Known Allergies    No current facility-administered medications on file prior to encounter.     Current Outpatient Medications on File Prior to Encounter   Medication Sig    acetaminophen (TYLENOL ARTHRITIS PAIN) 650 MG TbSR Take 650 mg by mouth every 8 (eight) hours.    albuterol (PROVENTIL) 2.5 mg /3 mL (0.083 %) nebulizer solution NEBULIZE 1 vial EVERY 6 HOURS AS NEEDED FOR WHEEZING    albuterol (VENTOLIN HFA) 90 mcg/actuation inhaler INHALE 2 PUFF(S) BY MOUTH EVERY 4 - 6 HOURS AS NEEDED FOR SHORTNESS OF BREATH or WHEEZING    aspirin (ECOTRIN) 81 MG EC tablet Take 81 mg by mouth once daily.     carboxymethylcellulose (REFRESH PLUS) 0.5 % Dpet 1 drop 3 (three) times daily as needed.    fluticasone propionate (FLONASE) 50 mcg/actuation nasal spray USE TWO SPRAYS IN EACH NOSTRIL ONCE A DAY    isosorbide mononitrate (IMDUR) 30 MG 24 hr tablet Take 1 tablet (30 mg total) by mouth once daily.    KEYTRUDA 25 mg/mL Soln Inject into the vein every 21 days.    meclizine (ANTIVERT) 25 mg tablet CHEW and SWALLOW 1 TABLET(S) BY MOUTH EVERY 6 HOURS AS NEEDED FOR DIZZINESS    metoprolol tartrate (LOPRESSOR) 25 MG tablet TAKE ONE TABLET BY MOUTH TWICE DAILY FOR BLOOD PRESSURE    nitroGLYCERIN (NITROSTAT) 0.4 MG SL tablet place 1 tablet under the tongue AS NEEDED. no more than 3 in 15 minutes    omeprazole (PRILOSEC) 40 MG capsule Take 40  mg by mouth once daily.    rosuvastatin (CRESTOR) 10 MG tablet TAKE 1 TABLET BY MOUTH EVERY DAY (Patient taking differently: Take 10 mg by mouth every evening.)    TRELEGY ELLIPTA 100-62.5-25 mcg DsDv INHALE ONE PUFF INTO THE LUNGS ONCE A DAY    heparin, porcine, PF, (HEPARIN FLUSH 100 UNITS/ML) 100 unit/mL Syrg     [DISCONTINUED] montelukast (SINGULAIR) 10 mg tablet TAKE 1 TABLET(S) BY MOUTH EVERY EVENING     Family History       Problem Relation (Age of Onset)    Breast cancer Mother    COPD Sister    Cancer Mother, Father, Sister, Maternal Grandmother, Maternal Grandfather, Paternal Grandmother, Paternal Grandfather, Sister    Heart attack Sister    Heart disease Mother, Maternal Grandmother, Maternal Grandfather, Sister    Kidney cancer Son          Tobacco Use    Smoking status: Former Smoker     Packs/day: 0.25     Years: 50.00     Pack years: 12.50     Types: Cigarettes     Quit date: 2017     Years since quittin.4    Smokeless tobacco: Never Used   Substance and Sexual Activity    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 Negative for diaphoresis, fever and unexpected weight change.   HENT: Negative.     Respiratory: Negative.     Cardiovascular: Negative.    Gastrointestinal: Negative.    Endocrine: Negative.    Genitourinary: Negative.    Musculoskeletal: Negative.    Neurological:  Positive for weakness.   Hematological: Negative.    Objective:     Vital Signs (Most Recent):  Temp: 98.7 °F (37.1 °C) (22 0902)  Pulse: 66 (22 1445)  Resp: 14 (22 1445)  BP: (!) 100/52 (22 1445)  SpO2: 97 % (22 1445)   Vital Signs (24h Range):  Temp:  [98 °F (36.7 °C)-98.7 °F (37.1 °C)] 98.7 °F (37.1 °C)  Pulse:  [56-66] 66  Resp:  [14-20] 14  SpO2:  [93 %-97 %] 97 %  BP: ()/(51-68) 100/52     Weight: 74.8 kg (165 lb)  Body mass index is  29.23 kg/m².    Physical Exam  Constitutional:       Appearance: Normal appearance.   HENT:      Nose: Nose normal.      Mouth/Throat:      Mouth: Mucous membranes are moist.   Eyes:      Extraocular Movements: Extraocular movements intact.   Cardiovascular:      Rate and Rhythm: Regular rhythm. Bradycardia present.      Pulses: Normal pulses.   Pulmonary:      Breath sounds: Normal breath sounds.   Abdominal:      Palpations: Abdomen is soft.   Musculoskeletal:         General: No swelling or tenderness. Normal range of motion.   Skin:     General: Skin is warm and dry.   Neurological:      General: No focal deficit present.      Mental Status: She is alert and oriented to person, place, and time.   Psychiatric:         Mood and Affect: Mood normal.           Significant Labs: All pertinent labs within the past 24 hours have been reviewed.    Significant Imaging: I have reviewed all pertinent imaging results/findings within the past 24 hours.

## 2022-04-06 NOTE — Clinical Note
Chemo next week Schedule PET prior to f/u may 4  Schedule chemo next week with cbc,cmp , mag prior to chemo

## 2022-04-06 NOTE — ED NOTES
Order for potassium 10 meq in 100 mL and 0.9% NaCL 20 meq infusion clarified okay by pharmacy to run together

## 2022-04-06 NOTE — ED TRIAGE NOTES
Pt arrived to the ED due to a reported low potassium at MD's office that was drawn and resulted this morning. Pt does report generalized fatigue and intermittent SOB x 1 week

## 2022-04-06 NOTE — HPI
Ade Castellano is a 77 yo female with significant history for hypertension, COPD not on home oxygen, quit smoking 2018, history of stage IV cancer squamous cell lung cancer and right breast cancer status post systemic chemotherapy with Mets to lymph node currently followed by Dr. Holliday who was sent to the hospital for low potassium.  Patient was supposed to receive Keytruda infusion today. Patient also report weakness and decreased or intake.  Patient was also found to be orthostatic in ED.  Denies headache dizziness chest pain shortness of breath.  EKG SB 59.

## 2022-04-07 ENCOUNTER — TELEPHONE (OUTPATIENT)
Dept: HEMATOLOGY/ONCOLOGY | Facility: CLINIC | Age: 77
End: 2022-04-07
Payer: MEDICARE

## 2022-04-07 VITALS
SYSTOLIC BLOOD PRESSURE: 114 MMHG | HEART RATE: 65 BPM | TEMPERATURE: 98 F | HEIGHT: 63 IN | RESPIRATION RATE: 17 BRPM | DIASTOLIC BLOOD PRESSURE: 66 MMHG | WEIGHT: 155.19 LBS | OXYGEN SATURATION: 98 % | BODY MASS INDEX: 27.5 KG/M2

## 2022-04-07 DIAGNOSIS — C50.919 METASTATIC BREAST CANCER: ICD-10-CM

## 2022-04-07 DIAGNOSIS — E03.2 HYPOTHYROIDISM DUE TO DRUGS: ICD-10-CM

## 2022-04-07 DIAGNOSIS — C50.111 MALIGNANT NEOPLASM OF CENTRAL PORTION OF RIGHT BREAST IN FEMALE, ESTROGEN RECEPTOR NEGATIVE: Primary | ICD-10-CM

## 2022-04-07 DIAGNOSIS — C34.91 SQUAMOUS CELL CARCINOMA OF RIGHT LUNG: ICD-10-CM

## 2022-04-07 DIAGNOSIS — Z17.1 MALIGNANT NEOPLASM OF CENTRAL PORTION OF RIGHT BREAST IN FEMALE, ESTROGEN RECEPTOR NEGATIVE: Primary | ICD-10-CM

## 2022-04-07 LAB
ALBUMIN SERPL BCP-MCNC: 3.3 G/DL (ref 3.5–5.2)
ANION GAP SERPL CALC-SCNC: 9 MMOL/L (ref 8–16)
BUN SERPL-MCNC: 13 MG/DL (ref 8–23)
CALCIUM SERPL-MCNC: 8.5 MG/DL (ref 8.7–10.5)
CHLORIDE SERPL-SCNC: 105 MMOL/L (ref 95–110)
CO2 SERPL-SCNC: 24 MMOL/L (ref 23–29)
CREAT SERPL-MCNC: 0.8 MG/DL (ref 0.5–1.4)
EST. GFR  (AFRICAN AMERICAN): >60 ML/MIN/1.73 M^2
EST. GFR  (NON AFRICAN AMERICAN): >60 ML/MIN/1.73 M^2
ESTIMATED AVG GLUCOSE: 123 MG/DL (ref 68–131)
GLUCOSE SERPL-MCNC: 130 MG/DL (ref 70–110)
HBA1C MFR BLD: 5.9 % (ref 4–5.6)
PHOSPHATE SERPL-MCNC: 2.9 MG/DL (ref 2.7–4.5)
POTASSIUM SERPL-SCNC: 3.4 MMOL/L (ref 3.5–5.1)
SODIUM SERPL-SCNC: 138 MMOL/L (ref 136–145)

## 2022-04-07 PROCEDURE — 94761 N-INVAS EAR/PLS OXIMETRY MLT: CPT

## 2022-04-07 PROCEDURE — 80069 RENAL FUNCTION PANEL: CPT | Performed by: NURSE PRACTITIONER

## 2022-04-07 PROCEDURE — 27000221 HC OXYGEN, UP TO 24 HOURS

## 2022-04-07 PROCEDURE — G0378 HOSPITAL OBSERVATION PER HR: HCPCS

## 2022-04-07 PROCEDURE — 83036 HEMOGLOBIN GLYCOSYLATED A1C: CPT | Performed by: NURSE PRACTITIONER

## 2022-04-07 PROCEDURE — 99900035 HC TECH TIME PER 15 MIN (STAT)

## 2022-04-07 PROCEDURE — 25000003 PHARM REV CODE 250: Performed by: NURSE PRACTITIONER

## 2022-04-07 PROCEDURE — 36415 COLL VENOUS BLD VENIPUNCTURE: CPT | Performed by: NURSE PRACTITIONER

## 2022-04-07 RX ORDER — BENZONATATE 100 MG/1
100 CAPSULE ORAL 3 TIMES DAILY PRN
Qty: 20 CAPSULE | Refills: 0 | Status: SHIPPED | OUTPATIENT
Start: 2022-04-07 | End: 2022-04-17

## 2022-04-07 RX ORDER — POTASSIUM CHLORIDE 20 MEQ/1
40 TABLET, EXTENDED RELEASE ORAL ONCE
Status: COMPLETED | OUTPATIENT
Start: 2022-04-07 | End: 2022-04-07

## 2022-04-07 RX ORDER — ISOSORBIDE MONONITRATE 30 MG/1
30 TABLET, EXTENDED RELEASE ORAL DAILY
Qty: 30 TABLET | Refills: 11 | Status: SHIPPED | OUTPATIENT
Start: 2022-04-07 | End: 2023-05-09 | Stop reason: SDUPTHER

## 2022-04-07 RX ADMIN — POTASSIUM CHLORIDE 40 MEQ: 1500 TABLET, EXTENDED RELEASE ORAL at 10:04

## 2022-04-07 NOTE — TELEPHONE ENCOUNTER
To Yanira page     Pt MRN# [id number] cameron chilel just called our office   She said she was told to get labs on 4-11   She would like you to contact her to discuss   She has to see Dr Manning in [place] on 4-14   She lives in [place] and due to travel distance she had 2 questions for you  She would like to know if hse could go to lab at th hospital morning on 4-14 early and get labs that she needs so they will be ready  when she sees Dr manning    She stated if she has to go on 4-11 she will but it would be more convenient for h on the 14th     She also needs you to place the lab orders for her as there are none pertaining to her hospital discharge ordered.    can you please contact her and discuss thise concerns with her  She stated you were assisting her with her discharge arrangements  thank you Anthony GARZON

## 2022-04-07 NOTE — PLAN OF CARE
[facility] - Med Surg  Discharge Final Note    Primary Care Provider: Jeannine Huitron MD    Expected Discharge Date: 4/7/2022  SW gave patient follow up appointment for PCP. SW discussed patient responsibilities for MANAGING YOUR HEALTH AT HOME  EDUCATION:  Things You are responsible For To Manage Your Care At Home:  1.    Getting your prescriptions filled   2.    Taking your medications as directed, DO NOT MISS ANY DOSES!  3.    Going to your follow-up doctor appointment. This is important because it  allow the doctor to monitor your progress and determine if  any changes need to made to your treatment plan.  Call [facility] Help at home number for new or repeated problems / symptoms   Call 911 for CP and / or SOB  SW informed nurse, Ileana, that patient is ready for discharge from case management standpoint.     Final Discharge Note (most recent)       Final Note - 04/07/22 0852          Final Note    Assessment Type Final Discharge Note     Anticipated Discharge Disposition Home or Self Care     What phone number can be called within the next 1-3 days to see how you are doing after discharge? [phone]     Hospital Resources/Appts/Education Provided Appointments scheduled and added to AVS;Provided patient/caregiver with written discharge plan information        Post-Acute Status    Post-Acute Authorization Other     Other Status No Post-Acute Service Needs     Discharge Delays None known at this time                     Important Message from Medicare             Contact Info       Jeannine Huitron MD   Specialty: Family Medicine   Relationship: PCP - General    [address] [id number]   Phone: [phone]       Next Steps: Go on 4/14/2022    Instructions: Hospital follow-up scheduled for 10:40 a.m.

## 2022-04-07 NOTE — PLAN OF CARE
[facility] CASE MANAGEMENT                  WRITTEN DISCHARGE INFORMATION      APPOINTMENTS AND RESOURCES TO HELP YOU MANAGE YOUR CARE AT HOME BASED ON YOUR PREFERENCES:   Follow-up Information     Jeannine Huitron MD. Go on 4/14/2022.    Specialty: Family Medicine  Why: Hospital follow-up scheduled for 10:40 a.m.  Contact information:  [address] [place] [address]  [phone]                         Healthy Living Instructions to HELP MANAGE YOUR CARE AT HOME:  Things You are responsible for:  1.    Getting your prescriptions filled   2.    Taking your medications as directed, DO NOT MISS ANY DOSES!  3.    Following the diet and exercise recommended by your doctor  4.    Going to your follow-up doctor appointment. This is important because it allows the doctor to monitor your progress and determine if any changes need to made to your treatment plan.  5. If you have any questions about MANAGING YOUR CARE AT HOME Call the Nurse Care Line for 24/7 Assistance [phone]       Please answer any calls you may receive from [facility]. We want to continue to support you as you manage your healthcare needs. [facility] is happy to have the opportunity to serve you.      Thank you for choosing [facility] for your healthcare needs!  Your [facility] Case Management Team,    Yanira Sorto   II  Care Management

## 2022-04-07 NOTE — DISCHARGE SUMMARY
[facility] Medicine  Discharge Summary      Patient Name: Ade Castellano  MRN: [id number]  Patient Class: OP- Observation  Admission Date: 4/6/2022  Hospital Length of Stay: 0 days  Discharge Date and Time:  04/07/2022 8:32 AM  Attending Physician: Missy Sigala MD   Discharging Provider: Araceli Rosenberg NP  Primary Care Provider: Jeannine Huitron MD      HPI:   Ade Castellano is a 77 yo female with significant history for hypertension, COPD not on home oxygen, quit smoking 2018, history of stage IV cancer squamous cell lung cancer and right breast cancer status post systemic chemotherapy with Mets to lymph node currently followed by Dr. Holliday who was sent to the hospital for low potassium.  Patient was supposed to receive Keytruda infusion today. Patient also report weakness and decreased or intake.  Patient was also found to be orthostatic in ED.  Denies headache dizziness chest pain shortness of breath.  EKG SB 59.       * No surgery found *      Hospital Course:   Placed in observation for hypokalemia and orthostatic hypotension. Hypokalemia improved with replacement. OBP resolved after IVF. Patient ambulated alone to and from restroom without issues. Sitting on edge of bed  eating breakfast at the time of my evaluation today. Hold metoprolol. Continue imdur with parameters. Repeat RFP next Monday. Patient HDS for discharge home.        Goals of Care Treatment Preferences:  Code Status: Full Code    Living Will: Yes              Consults:     No new Assessment & Plan notes have been filed under this hospital service since the last note was generated.  Service: Hospital Medicine    Final Active Diagnoses:    Diagnosis Date Noted POA    PRINCIPAL PROBLEM:  Hypokalemia [E87.6] 04/06/2022 Yes    Generalized weakness [R53.1] 04/06/2022 Yes    Orthostatic hypotension [I95.1] 04/06/2022 Yes    Chronic heart failure with preserved ejection fraction [I50.32] 08/05/2020 Yes    Metastatic  breast cancer [C50.919] 07/01/2019 Yes     Chronic    Stage 3 severe COPD by GOLD classification [J44.9] 10/25/2018 Yes    Squamous cell carcinoma lung [C34.90] 03/02/2018 Yes     Chronic    Prediabetes [R73.03] 02/22/2017 Yes      Problems Resolved During this Admission:       Discharged Condition: good    Disposition: Home or Self Care    Follow Up:   Follow-up Information     Jeannine Huitron MD Follow up.    Specialty: Family Medicine  Contact information:  [address] [address]  [phone]                       Patient Instructions:      RENAL FUNCTION PANEL   Standing Status: Future Standing Exp. Date: 06/06/23   Order Comments: Please send results to PCP     Diet Adult Regular     Notify your health care provider if you experience any of the following:  temperature >100.4     Notify your health care provider if you experience any of the following:  difficulty breathing or increased cough     Notify your health care provider if you experience any of the following:  increased confusion or weakness     Activity as tolerated       Significant Diagnostic Studies: Labs: All labs within the past 24 hours have been reviewed    Pending Diagnostic Studies:     Procedure Component Value Units Date/Time    Hemoglobin A1c [[id number]] Collected: 04/07/22 0529    Order Status: Sent Lab Status: In process Updated: 04/07/22 0529    Specimen: Blood          Medications:  Reconciled Home Medications:      Medication List      START taking these medications    benzonatate 100 MG capsule  Commonly known as: TESSALON  Take 1 capsule (100 mg total) by mouth 3 (three) times daily as needed for Cough.        CHANGE how you take these medications    isosorbide mononitrate 30 MG 24 hr tablet  Commonly known as: IMDUR  Take 1 tablet (30 mg total) by mouth once daily. Hold if SBP <120  What changed: additional instructions     rosuvastatin 10 MG tablet  Commonly known as: CRESTOR  TAKE 1 TABLET BY MOUTH EVERY  DAY  What changed: when to take this        CONTINUE taking these medications    * albuterol 90 mcg/actuation inhaler  Commonly known as: VENTOLIN HFA  INHALE 2 PUFF(S) BY MOUTH EVERY 4 - 6 HOURS AS NEEDED FOR SHORTNESS OF BREATH or WHEEZING     * albuterol 2.5 mg /3 mL (0.083 %) nebulizer solution  Commonly known as: PROVENTIL  NEBULIZE 1 vial EVERY 6 HOURS AS NEEDED FOR WHEEZING     aspirin 81 MG EC tablet  Commonly known as: ECOTRIN  Take 81 mg by mouth once daily.     carboxymethylcellulose 0.5 % Dpet  Commonly known as: REFRESH PLUS  1 drop 3 (three) times daily as needed.     fluticasone propionate 50 mcg/actuation nasal spray  Commonly known as: FLONASE  USE TWO SPRAYS IN EACH NOSTRIL ONCE A DAY     heparin, porcine (PF) 100 unit/mL Syrg  Commonly known as: heparin flush 100 units/mL     KEYTRUDA 25 mg/mL Soln  Generic drug: pembrolizumab  Inject into the vein every 21 days.     meclizine 25 mg tablet  Commonly known as: ANTIVERT  CHEW and SWALLOW 1 TABLET(S) BY MOUTH EVERY 6 HOURS AS NEEDED FOR DIZZINESS     nitroGLYCERIN 0.4 MG SL tablet  Commonly known as: NITROSTAT  place 1 tablet under the tongue AS NEEDED. no more than 3 in 15 minutes     omeprazole 40 MG capsule  Commonly known as: PRILOSEC  Take 40 mg by mouth once daily.     TRELEGY ELLIPTA 100-62.5-25 mcg Dsdv  Generic drug: fluticasone-umeclidin-vilanter  INHALE ONE PUFF INTO THE LUNGS ONCE A DAY     TYLENOL ARTHRITIS PAIN 650 MG Tbsr  Generic drug: acetaminophen  Take 650 mg by mouth every 8 (eight) hours.         * This list has 2 medication(s) that are the same as other medications prescribed for you. Read the directions carefully, and ask your doctor or other care provider to review them with you.            STOP taking these medications    metoprolol tartrate 25 MG tablet  Commonly known as: LOPRESSOR            Indwelling Lines/Drains at time of discharge:   Lines/Drains/Airways     Central Venous Catheter Line  Duration           Port A Cath  Single Lumen 03/07/18 right subclavian 1492 days                Time spent on the discharge of patient: 30 minutes         Araceli Rosenberg NP  Department of Hospital Medicine  [facility]

## 2022-04-07 NOTE — PLAN OF CARE
Problem: Adult Inpatient Plan of Care  Goal: Plan of Care Review  Outcome: Ongoing, Progressing  Goal: Patient-Specific Goal (Individualized)  Outcome: Ongoing, Progressing  Goal: Absence of Hospital-Acquired Illness or Injury  Outcome: Ongoing, Progressing  Goal: Optimal Comfort and Wellbeing  Outcome: Ongoing, Progressing  Goal: Readiness for Transition of Care  Outcome: Ongoing, Progressing     Problem: Fall Injury Risk  Goal: Absence of Fall and Fall-Related Injury  Outcome: Ongoing, Progressing     Problem: Infection  Goal: Absence of Infection Signs and Symptoms  Outcome: Ongoing, Progressing       Patient remained free from falls/injury throughout shift.  No acute changes in status.  Bed locked in lowest position.  Side rails up x2.  Call bell within reach.  Purposeful rounding maintained throughout shift.

## 2022-04-07 NOTE — PLAN OF CARE
04/07/22 0847   Discharge Planning   Assessment Type Discharge Planning Brief Assessment   Resource/Environmental Concerns none   Support Systems Family members;Friends/neighbors   Equipment Currently Used at Home oxygen;nebulizer   Current Living Arrangements home/apartment/condo   Patient/Family Anticipates Transition to home   Patient/Family Anticipated Services at Transition none   DME Needed Upon Discharge  none   Discharge Plan A Home   Discharge Plan B Other  (TBD)     Pt lives alone, independent. Pt states that her help at home is her niece, Sherri, and her friend. Pt will drive herself home upon discharge.     [facility] [address] [address]  [address] [address] [address]  Phone: [phone] Fax: [phone]

## 2022-04-07 NOTE — HOSPITAL COURSE
Placed in observation for hypokalemia and orthostatic hypotension. Hypokalemia improved with replacement. OBP resolved after IVF. Patient ambulated alone to and from restroom without issues. Sitting on edge of bed  eating breakfast at the time of my evaluation today. Hold metoprolol. Continue imdur with parameters. Repeat RFP next Monday. Patient HDS for discharge home.

## 2022-04-08 LAB — BACTERIA UR CULT: NORMAL

## 2022-04-11 ENCOUNTER — OFFICE VISIT (OUTPATIENT)
Dept: FAMILY MEDICINE | Facility: CLINIC | Age: 77
End: 2022-04-11
Payer: MEDICARE

## 2022-04-11 VITALS
WEIGHT: 162.94 LBS | SYSTOLIC BLOOD PRESSURE: 116 MMHG | BODY MASS INDEX: 28.87 KG/M2 | RESPIRATION RATE: 16 BRPM | HEART RATE: 64 BPM | OXYGEN SATURATION: 95 % | DIASTOLIC BLOOD PRESSURE: 74 MMHG | HEIGHT: 63 IN | TEMPERATURE: 98 F

## 2022-04-11 DIAGNOSIS — Z12.11 COLON CANCER SCREENING: ICD-10-CM

## 2022-04-11 DIAGNOSIS — I95.1 ORTHOSTATIC HYPOTENSION: ICD-10-CM

## 2022-04-11 DIAGNOSIS — E87.6 HYPOKALEMIA: Primary | ICD-10-CM

## 2022-04-11 PROCEDURE — 3078F DIAST BP <80 MM HG: CPT | Mod: CPTII,S$GLB,, | Performed by: NURSE PRACTITIONER

## 2022-04-11 PROCEDURE — 1160F PR REVIEW ALL MEDS BY PRESCRIBER/CLIN PHARMACIST DOCUMENTED: ICD-10-PCS | Mod: CPTII,S$GLB,, | Performed by: NURSE PRACTITIONER

## 2022-04-11 PROCEDURE — 1101F PT FALLS ASSESS-DOCD LE1/YR: CPT | Mod: CPTII,S$GLB,, | Performed by: NURSE PRACTITIONER

## 2022-04-11 PROCEDURE — 99214 PR OFFICE/OUTPT VISIT, EST, LEVL IV, 30-39 MIN: ICD-10-PCS | Mod: S$GLB,,, | Performed by: NURSE PRACTITIONER

## 2022-04-11 PROCEDURE — 1160F RVW MEDS BY RX/DR IN RCRD: CPT | Mod: CPTII,S$GLB,, | Performed by: NURSE PRACTITIONER

## 2022-04-11 PROCEDURE — 3078F PR MOST RECENT DIASTOLIC BLOOD PRESSURE < 80 MM HG: ICD-10-PCS | Mod: CPTII,S$GLB,, | Performed by: NURSE PRACTITIONER

## 2022-04-11 PROCEDURE — 1126F PR PAIN SEVERITY QUANTIFIED, NO PAIN PRESENT: ICD-10-PCS | Mod: CPTII,S$GLB,, | Performed by: NURSE PRACTITIONER

## 2022-04-11 PROCEDURE — 3074F PR MOST RECENT SYSTOLIC BLOOD PRESSURE < 130 MM HG: ICD-10-PCS | Mod: CPTII,S$GLB,, | Performed by: NURSE PRACTITIONER

## 2022-04-11 PROCEDURE — 1126F AMNT PAIN NOTED NONE PRSNT: CPT | Mod: CPTII,S$GLB,, | Performed by: NURSE PRACTITIONER

## 2022-04-11 PROCEDURE — 99999 PR PBB SHADOW E&M-EST. PATIENT-LVL IV: CPT | Mod: PBBFAC,,, | Performed by: NURSE PRACTITIONER

## 2022-04-11 PROCEDURE — 3288F FALL RISK ASSESSMENT DOCD: CPT | Mod: CPTII,S$GLB,, | Performed by: NURSE PRACTITIONER

## 2022-04-11 PROCEDURE — 99999 PR PBB SHADOW E&M-EST. PATIENT-LVL IV: ICD-10-PCS | Mod: PBBFAC,,, | Performed by: NURSE PRACTITIONER

## 2022-04-11 PROCEDURE — 1159F PR MEDICATION LIST DOCUMENTED IN MEDICAL RECORD: ICD-10-PCS | Mod: CPTII,S$GLB,, | Performed by: NURSE PRACTITIONER

## 2022-04-11 PROCEDURE — 3074F SYST BP LT 130 MM HG: CPT | Mod: CPTII,S$GLB,, | Performed by: NURSE PRACTITIONER

## 2022-04-11 PROCEDURE — 1157F ADVNC CARE PLAN IN RCRD: CPT | Mod: CPTII,S$GLB,, | Performed by: NURSE PRACTITIONER

## 2022-04-11 PROCEDURE — 3044F PR MOST RECENT HEMOGLOBIN A1C LEVEL <7.0%: ICD-10-PCS | Mod: CPTII,S$GLB,, | Performed by: NURSE PRACTITIONER

## 2022-04-11 PROCEDURE — 99214 OFFICE O/P EST MOD 30 MIN: CPT | Mod: S$GLB,,, | Performed by: NURSE PRACTITIONER

## 2022-04-11 PROCEDURE — 1101F PR PT FALLS ASSESS DOC 0-1 FALLS W/OUT INJ PAST YR: ICD-10-PCS | Mod: CPTII,S$GLB,, | Performed by: NURSE PRACTITIONER

## 2022-04-11 PROCEDURE — 3288F PR FALLS RISK ASSESSMENT DOCUMENTED: ICD-10-PCS | Mod: CPTII,S$GLB,, | Performed by: NURSE PRACTITIONER

## 2022-04-11 PROCEDURE — 1157F PR ADVANCE CARE PLAN OR EQUIV PRESENT IN MEDICAL RECORD: ICD-10-PCS | Mod: CPTII,S$GLB,, | Performed by: NURSE PRACTITIONER

## 2022-04-11 PROCEDURE — 1159F MED LIST DOCD IN RCRD: CPT | Mod: CPTII,S$GLB,, | Performed by: NURSE PRACTITIONER

## 2022-04-11 PROCEDURE — 3044F HG A1C LEVEL LT 7.0%: CPT | Mod: CPTII,S$GLB,, | Performed by: NURSE PRACTITIONER

## 2022-04-11 RX ORDER — POTASSIUM CHLORIDE 750 MG/1
10 CAPSULE, EXTENDED RELEASE ORAL 2 TIMES DAILY
Qty: 60 CAPSULE | Refills: 11 | Status: SHIPPED | OUTPATIENT
Start: 2022-04-11 | End: 2022-10-17

## 2022-04-11 RX ORDER — HEPARIN 100 UNIT/ML
500 SYRINGE INTRAVENOUS
Status: CANCELLED | OUTPATIENT
Start: 2022-04-11

## 2022-04-11 RX ORDER — SODIUM CHLORIDE 0.9 % (FLUSH) 0.9 %
10 SYRINGE (ML) INJECTION
Status: CANCELLED | OUTPATIENT
Start: 2022-04-11

## 2022-04-11 NOTE — PROGRESS NOTES
Subjective:      Patient ID: Ade Castellano is a 76 y.o. female.  Pt presents to clinic for hospital f/u, see course below. Was scheduled to have keytruda infusion on 4/6/22, pre procedure potassium was 2.6, sent to ER by  for evaluation. Pt reports no symptoms at time of hospitalization and remains in normal state of health today. Had repeat K this AM, 3.4.  Also found to be orthostatic in hospital, metoprolol was held and has resumed once she went home. Denies any acute complaints today.     Hospital Course:   Ade Castellano is a 75 yo female with significant history for hypertension, COPD not on home oxygen, quit smoking 2018, history of stage IV cancer squamous cell lung cancer and right breast cancer status post systemic chemotherapy with Mets to lymph node currently followed by Dr. Holliday who was sent to the hospital for low potassium.  Patient was supposed to receive Keytruda infusion today. Patient also report weakness and decreased or intake.  Patient was also found to be orthostatic in ED.  Denies headache dizziness chest pain shortness of breath. EKG SB 59. Placed in observation for hypokalemia and orthostatic hypotension. Hypokalemia improved with replacement. OBP resolved after IVF. Patient ambulated alone to and from restroom without issues. Sitting on edge of bed  eating breakfast at the time of my evaluation today. Hold metoprolol. Continue imdur with parameters. Repeat RFP next Monday. Patient HDS for discharge home.     Review of Systems   Constitutional: Negative for activity change, appetite change, fever and unexpected weight change.   Respiratory: Negative for chest tightness and shortness of breath.    Cardiovascular: Negative for chest pain, palpitations, leg swelling and claudication.   Gastrointestinal: Negative for abdominal pain, constipation, diarrhea, nausea and vomiting.   Genitourinary: Negative for difficulty urinating, dysuria and menstrual problem.   Musculoskeletal:  "Negative for myalgias.   Neurological: Negative.  Negative for dizziness, weakness, light-headedness and headaches.   All other systems reviewed and are negative.        Objective:     Vitals:    04/11/22 0955   BP: 116/74   Pulse: 64   Resp: 16   Temp: 98.2 °F (36.8 °C)   TempSrc: Oral   SpO2: 95%   Weight: 73.9 kg (162 lb 14.7 oz)   Height: 5' 3" (1.6 m)     Physical Exam  Vitals and nursing note reviewed.   Constitutional:       General: She is not in acute distress.     Appearance: Normal appearance. She is well-developed, well-groomed and normal weight. She is not ill-appearing.   HENT:      Head: Normocephalic and atraumatic.      Right Ear: External ear normal.      Left Ear: External ear normal.      Nose: Nose normal.      Mouth/Throat:      Lips: Pink.      Mouth: Mucous membranes are moist.   Eyes:      General: Lids are normal. Vision grossly intact. Gaze aligned appropriately.      Conjunctiva/sclera: Conjunctivae normal.      Pupils: Pupils are equal, round, and reactive to light.   Neck:      Trachea: Trachea and phonation normal.   Cardiovascular:      Rate and Rhythm: Normal rate and regular rhythm.      Heart sounds: Normal heart sounds.   Pulmonary:      Effort: Pulmonary effort is normal. No respiratory distress.      Breath sounds: Normal breath sounds and air entry.   Abdominal:      General: Abdomen is flat. Bowel sounds are normal. There is no distension.      Palpations: Abdomen is soft.      Tenderness: There is no abdominal tenderness.   Musculoskeletal:      Cervical back: Normal range of motion and neck supple.   Skin:     General: Skin is warm and dry.      Findings: No rash.   Neurological:      General: No focal deficit present.      Mental Status: She is alert and oriented to person, place, and time. Mental status is at baseline.      Sensory: Sensation is intact.      Motor: Motor function is intact.      Coordination: Coordination is intact.      Gait: Gait is intact. "   Psychiatric:         Attention and Perception: Attention and perception normal.         Mood and Affect: Mood and affect normal.         Speech: Speech normal.         Behavior: Behavior normal. Behavior is cooperative.         Thought Content: Thought content normal.         Cognition and Memory: Cognition and memory normal.         Judgment: Judgment normal.       Assessment and Plan:     1. Hypokalemia  Initiate daily repletion therapy, normal kidney functioning, likely secondary to keytruda infusions, home precautions discussed with pt  - potassium chloride (MICRO-K) 10 MEQ CpSR; Take 1 capsule (10 mEq total) by mouth 2 (two) times daily.  Dispense: 60 capsule; Refill: 11    2. Orthostatic hypotension  Asymptomatic, fall and position change precautions discussed with patient    3. Colon cancer screening  - Case Request Endoscopy: COLONOSCOPY           FILOMENA Turner, FNP-C  Family/Internal Medicine  [facility]

## 2022-04-12 ENCOUNTER — INFUSION (OUTPATIENT)
Dept: INFUSION THERAPY | Facility: HOSPITAL | Age: 77
End: 2022-04-12
Attending: INTERNAL MEDICINE
Payer: MEDICARE

## 2022-04-12 ENCOUNTER — TELEPHONE (OUTPATIENT)
Dept: CARDIOLOGY | Facility: CLINIC | Age: 77
End: 2022-04-12
Payer: MEDICARE

## 2022-04-12 VITALS
SYSTOLIC BLOOD PRESSURE: 110 MMHG | TEMPERATURE: 98 F | DIASTOLIC BLOOD PRESSURE: 68 MMHG | OXYGEN SATURATION: 97 % | HEART RATE: 54 BPM | RESPIRATION RATE: 16 BRPM

## 2022-04-12 DIAGNOSIS — C50.919 METASTATIC BREAST CANCER: Primary | ICD-10-CM

## 2022-04-12 PROCEDURE — 96413 CHEMO IV INFUSION 1 HR: CPT

## 2022-04-12 PROCEDURE — 25000003 PHARM REV CODE 250: Performed by: INTERNAL MEDICINE

## 2022-04-12 PROCEDURE — 63600175 PHARM REV CODE 636 W HCPCS: Mod: JG | Performed by: INTERNAL MEDICINE

## 2022-04-12 PROCEDURE — A4216 STERILE WATER/SALINE, 10 ML: HCPCS | Performed by: INTERNAL MEDICINE

## 2022-04-12 RX ORDER — HEPARIN 100 UNIT/ML
500 SYRINGE INTRAVENOUS
Status: DISCONTINUED | OUTPATIENT
Start: 2022-04-12 | End: 2022-04-12 | Stop reason: HOSPADM

## 2022-04-12 RX ORDER — SODIUM CHLORIDE 0.9 % (FLUSH) 0.9 %
10 SYRINGE (ML) INJECTION
Status: DISCONTINUED | OUTPATIENT
Start: 2022-04-12 | End: 2022-04-12 | Stop reason: HOSPADM

## 2022-04-12 RX ADMIN — HEPARIN 500 UNITS: 100 SYRINGE at 09:04

## 2022-04-12 RX ADMIN — Medication 10 ML: at 09:04

## 2022-04-12 RX ADMIN — SODIUM CHLORIDE 200 MG: 9 INJECTION, SOLUTION INTRAVENOUS at 08:04

## 2022-04-12 NOTE — PLAN OF CARE
Pt arrived to unit for C13 of Keytruda. Pt admitted to hospital last week due to low potassium. Prescribed potassium to take at home. Still endorsing SOB with a productive cough. Taking tessalon pearls at home which help pt states. Feeling okay overall today. Endorsing fatigue. VSS. Labs along with plan of care reviewed. Pt received Keytruda. Tolerated well without issues. Given discharge instructions along with next appointments. Left unit in NAD at time of discharge.

## 2022-04-12 NOTE — TELEPHONE ENCOUNTER
----- Message from Jane Zarate MA sent at 4/12/2022  2:11 PM CDT -----  Regarding: FW: pt requesting a call back    ----- Message -----  From: Kiki Conrad MA  Sent: 4/12/2022   1:59 PM CDT  To: [facility]af[facility] Waterman Staff  Subject: pt requesting a call back                        Name of Who is Calling: CHOLO HARRIS [[id number]]           What is the request in detail: pt requesting a call back needs to speak with someone in office in regards to medication that was stopped            Can the clinic reply by [facility]: n           What Number to Call Back if not in [facility]:

## 2022-04-13 ENCOUNTER — TELEPHONE (OUTPATIENT)
Dept: HEMATOLOGY/ONCOLOGY | Facility: CLINIC | Age: 77
End: 2022-04-13
Payer: MEDICARE

## 2022-04-13 ENCOUNTER — TELEPHONE (OUTPATIENT)
Dept: FAMILY MEDICINE | Facility: CLINIC | Age: 77
End: 2022-04-13
Payer: MEDICARE

## 2022-04-13 DIAGNOSIS — E87.6 HYPOKALEMIA: Primary | ICD-10-CM

## 2022-04-13 NOTE — TELEPHONE ENCOUNTER
Patient is requesting a Potassium level due to her cancer doctor asking that Dr. Huitron order it for her. Lab is pend and waiting on approval. Please advise

## 2022-04-13 NOTE — TELEPHONE ENCOUNTER
----- Message from Rachelle Mcdaniel sent at 4/13/2022  9:07 AM CDT -----  Type: Patient Call Back       What is the request in detail:  pt calling to speak to a nurse regarding her medication. Pt states she waited 3 days for a call back and never got a call back.      Can the clinic reply by [facility]? No       Would the patient rather a call back or a response via [facility]? Call back       Best call back number: [phone]        Thank you.

## 2022-04-13 NOTE — TELEPHONE ENCOUNTER
----- Message from Masha Lopez sent at 4/13/2022  3:57 PM CDT -----  Regarding: labs  Name of Who is Calling: CHOLO HARRIS [[id number]]      What is the request in detail: Patient is requesting a call back she states she was in the ER over night for low potassium and she states they have her medication and advised her to have her doctor order labs to keep up with her potassium levels       Can the clinic reply by [facility]: no      What Number to Call Back if not in [facility]: [phone]

## 2022-04-13 NOTE — TELEPHONE ENCOUNTER
----- Message from Rachelle Mcdaniel sent at 4/13/2022  9:07 AM CDT -----  Type: Patient Call Back       What is the request in detail:  pt calling to speak to a nurse regarding her medication. Pt states she waited 3 days for a call back and never got a call back.      Can the clinic reply by [facility]? No       Would the patient rather a call back or a response via [facility]? Call back       Best call back number: [phone]        Thank you.

## 2022-04-19 ENCOUNTER — TELEPHONE (OUTPATIENT)
Dept: CARDIOLOGY | Facility: CLINIC | Age: 77
End: 2022-04-19
Payer: MEDICARE

## 2022-04-20 ENCOUNTER — LAB VISIT (OUTPATIENT)
Dept: LAB | Facility: HOSPITAL | Age: 77
End: 2022-04-20
Attending: FAMILY MEDICINE
Payer: MEDICARE

## 2022-04-20 DIAGNOSIS — E87.6 HYPOKALEMIA: ICD-10-CM

## 2022-04-20 LAB — POTASSIUM SERPL-SCNC: 3.6 MMOL/L (ref 3.5–5.1)

## 2022-04-20 PROCEDURE — 84132 ASSAY OF SERUM POTASSIUM: CPT | Performed by: FAMILY MEDICINE

## 2022-04-20 PROCEDURE — 36415 COLL VENOUS BLD VENIPUNCTURE: CPT | Mod: PO | Performed by: FAMILY MEDICINE

## 2022-04-21 ENCOUNTER — TELEPHONE (OUTPATIENT)
Dept: FAMILY MEDICINE | Facility: CLINIC | Age: 77
End: 2022-04-21
Payer: MEDICARE

## 2022-04-21 NOTE — TELEPHONE ENCOUNTER
----- Message from Juliana Coronel sent at 4/21/2022  2:43 PM CDT -----  Type: Patient Call Back    Who called: self     What is the request in detail: pt would like results from lab     Can the clinic reply by [facility]?    Would the patient rather a call back or a response via [facility]? Call     Best call back number:[phone] (home)

## 2022-04-22 ENCOUNTER — TELEPHONE (OUTPATIENT)
Dept: FAMILY MEDICINE | Facility: CLINIC | Age: 77
End: 2022-04-22
Payer: MEDICARE

## 2022-04-22 NOTE — TELEPHONE ENCOUNTER
----- Message from Dinora Steele sent at 4/22/2022  3:01 PM CDT -----  Type: Patient Call Back    Who called: self    What is the request in detail: pt called in regards to test results. Pt would like to know her potassium levels. Please advise    Can the clinic reply by [facility]? No     Would the patient rather a call back or a response via [facility]? Call     Best call back number:.[phone]

## 2022-04-27 ENCOUNTER — HOSPITAL ENCOUNTER (OUTPATIENT)
Dept: RADIOLOGY | Facility: HOSPITAL | Age: 77
Discharge: HOME | End: 2022-04-27
Attending: INTERNAL MEDICINE
Payer: MEDICARE

## 2022-04-27 DIAGNOSIS — Z17.1 MALIGNANT NEOPLASM OF CENTRAL PORTION OF RIGHT BREAST IN FEMALE, ESTROGEN RECEPTOR NEGATIVE: ICD-10-CM

## 2022-04-27 DIAGNOSIS — C50.111 MALIGNANT NEOPLASM OF CENTRAL PORTION OF RIGHT BREAST IN FEMALE, ESTROGEN RECEPTOR NEGATIVE: ICD-10-CM

## 2022-04-27 LAB — POCT GLUCOSE: 116 MG/DL (ref 70–110)

## 2022-04-27 PROCEDURE — 78815 PET IMAGE W/CT SKULL-THIGH: CPT | Mod: 26,PS,, | Performed by: RADIOLOGY

## 2022-04-27 PROCEDURE — 78815 NM PET CT ROUTINE: ICD-10-PCS | Mod: 26,PS,, | Performed by: RADIOLOGY

## 2022-04-27 PROCEDURE — 78815 PET IMAGE W/CT SKULL-THIGH: CPT | Mod: TC

## 2022-05-02 NOTE — PROGRESS NOTES
Subjective:       Patient ID: Ade Castellano is a 76 y.o. female.    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 The PET(+) disease in the right axilla and right supraclavicular fossa will be boosted at 200 cGy per fraction to 6000 cGy total dose.  S/p LYMPH NODE, RIGHT AXILLA, BIOPSY: 6/16/21 . Metastatic poorly-differentiated carcinoma, c/w breast primary ERnegPRneg Her2 neg  PD-L1 (22C3) IHC CPS> is greater than or equal to 10  Pembrolizumab 7/22/21 -present        Prior Hx:  75 y/o female with history of  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  And Rt  breast Haja/p  cycle 6 of carboplatin/Abraxane 7/2/2018   She has  History of Stage 1A  Rt breast cancer Grade 3, ER/FL neg , Her 2 jose maria neg s/p lumpectomy 7/11/2014 and s/p adjuvant RT 9/2014 Pt declined Adjuvant chemo.Pathology showed a 1.15 cm, grade 3 infiltrating ductal carcinoma.  One sentinel lymph node was negative for malignancy.  Margins were negative and the closest margin was 1 cm.  She was staged  pT1c  pN0 cM0 stage IA.  She declined adjuvant chemo therapy.  She completed post operative radiation therapy at 400 cGy per fraction to 4000 cGy 9/2014  Pt hospitalized 11/2017 for  acute on chronic respiratory failure requiring intubation and ventilation. Has large lung mass in right lung with probable post obstructive pneumonia resulting in COPD exacerbation.CTA  chest 11/29/2017 revealed   Large right hilar mass with involvement of adjacent segmental pulmonary arterial branches and bronchi with associated postobstructive atelectasis and volume loss in the RML  with adjacent ground glass opacity concerning for underlying neoplastic process. Mediastinal and axillary adenopathy, with a right axillary lymph node measuring up to 2.0 cm in short axis diameter.She underwent rt axillary LN bx at outside facility- on 1/16/2018 at [facility]. Pathology revealed metastatic poorly differentiated carcinoma of unknown primary site. She next underwent lung bx at [facility] 1/31/2018  benign lung tissue. Repeat Right Lung Bx 2/14/2018 Pathology reveals Squamous cell carcinoma PD-L1 10% low expression EGFR NEG ALK NEG BRAF NEG. Outside slides axillary LN specimen were reviewed/comparison to lung bx findings . She completed    cycle 6 of carboplatin/Abraxane completed 7/2018 .PET/CT  4/19/2018 revealed there has been a excellent response to therapy.  At least 90% reduction in malignant activity.PET/CT 2/21/2019 increased size and irregularity of the right axillary lymph node with increased FDG avidityMAMMO/US rt breast 2/2019 revealed suspicious findings rt axilla LN.  Right axilla, mass, core biopsy: Positive for poorly differentiated carcinoma breast primary ER neg/VT neg Her2 neg.[facility] for outside reviewIt was determined  the lung tumor corresponds to a squamous cellcarcinoma and appears to be unrelated to the invasive ductal carcinoma of the breast. The axillary mass, in myopinion, corresponds to metastases of the breast primary. Although it is a poorly differentiated carcinoma, theimmunophenotype is most consistent with the one that the breast primary exhibited, and is inconsistent with metastatic squamous cell carcinoma. She was treated with  AC ( adriamycin 60m/m2/Cytoxan 600mg/m2)  q21d x 4 cycles completed 9/6/2019 . PET/CT 9/19/2019 shows disease response l decrease in  size and uptake of several right axillary lymph nodes and a supraclavicular lymph node.  Mild residual activity may indicate viable tumor.Pt followed by Rad/Onc. She was presented at [facility] tumor board and it was determined to proceed Radiation therapy only. No ALND due to concurrent lung and supraclavicular node. She  completed  RT 12/16/2019  To right axilla and right supraclavicular region PET/CT imaging  5/20/21 shows Continued increase in both size and hypermetabolic activity of right axillary level 1 lymph nodes a new, mildly hypermetabolic cutaneous thickening of the right breast. she underwent LYMPH NODE, RIGHT AXILLA, BIOPSY: 6/16/21 . Pathology showed Metastatic poorly-differentiated carcinoma, consistent with breast primary-ERneg/ PRneg  HER2  neg  Pt started on pembrolizumab 7/2021        Interval Hx: Pt hospitalized 4/6/22 with hypokalemia and orthostatic hypotension  S/p C12 pembrolizumab 3/16/22   Pt hospitalized 4/6/22 with hypokalemia and orthostatic hypotension  She reports 02 level low this morning  She reports 02 level improved with supp 02  She reports rt neck pain resolved    PET/CT 4/27/22 shows no evid to suggest recurrence or metastatic disease.     She reports pain Rt shoulder/RUE improved with OTC acetaminophen arthritis  MRI shoulder w w/out contrast 1/26/22  Mild edema and postcontrast enhancement at the myotendinous junction of the supraspinatus and infraspinatus, it is nonspecific and could be due to degenerative change or tendinosis.Small area of interstitial tear at the footprint insertion of the infraspinatus tendon.   No large rotator cuff tear.      She is followed by Dr. Katz, pulmonology    She also has been followed by GI for dysphagia  In past  She has undergone dilation      Last Mammo 6/16/21  No mammographic evidence of malignancy.BI-RADS Category 1: Negative      PET/CT 1/26/22 No definite evidence of hypermetabolic tumor.  Interval resolution of hypermetabolic  "right axillary lymph nodes.  No new hypermetabolic findings.        PrevHx:She had an abnormal mammogram 6/4/2014 whichRevealed a round solid mass 6mm in rt breast.She then underwent U/S guided core bx of rt breast mass on 6/17/2014.Pathology revealed infiltrating ductal carcinoma Grade 3 with tumorPresent in thin-walled spaces suggestive of lymphatic spaces. HormoneReceptor status on tumor specimen revealed ER negative 0% ,TX negative 0% and Her 2 jose maria negative.She subsequently underwent rt segmental mastectomy and SLN bxOn 7/11/2014. Pathology of rt breast lumpectomy revealed invasiveDuctal carcinoma with micropapillary pattern ( Invasive micropapillaryCa) with max tumor dimension 11.5mm with suggestion of tumor in Thin walled spaces c/w lymphovascular involvement. SurgicalMargins free of tumor, grade 3, ER/TX neg , Her 2 jose maria neg With [place] Lymph node negative for Neoplasm. Pathologic staging [id number](i-)Her mother was diagnosed with breast cancer in her 50's/        Review of Systems   Constitutional: Negative for activity change, appetite change, fever and unexpected weight change.   HENT: Negative for mouth sores, rhinorrhea and trouble swallowing.    Eyes: Negative for visual disturbance.   Respiratory: Negative for cough, shortness of breath and wheezing.    Cardiovascular: Negative for chest pain.   Gastrointestinal: Negative for abdominal pain and diarrhea.   Genitourinary: Negative for frequency.   Musculoskeletal: Negative for back pain.   Skin: Negative for rash.   Neurological: Negative for dizziness and headaches.   Hematological: Negative for adenopathy.   Psychiatric/Behavioral: The patient is not nervous/anxious.        Objective:          Vitals:    05/03/22 0809   BP: (!) 121/58   BP Location: Left arm   Patient Position: Sitting   Pulse: 68   Temp: 98.3 °F (36.8 °C)   TempSrc: Oral   SpO2: 97%   Weight: 73.3 kg (161 lb 9.6 oz)   Height: 5' 3" (1.6 m)         Physical Exam   Constitutional: She " is oriented to person, place, and time. She appears ill  HENT:   Head: Normocephalic.   Mouth/Throat: Oropharynx is clear and moist. No oropharyngeal exudate.   Eyes: Conjunctivae and lids are normal.No scleral icterus.   Neck: Normal range of motion. Neck supple. No thyromegaly present.   Cardiovascular: Normal rate, regular rhythm and normal heart sounds.    No murmur heard.  Pulmonary/Chest: Breath sounds normal. She has no wheezes. She has no rales.   Abdominal: Soft. Bowel sounds are normal.  There is no tenderness. There is no rebound and no guarding.   Left breast- no masses or axillary LAD noted  Right breast- breast thickening inner quad    She has no cervical adenopathy.     She has rt axillary adenopathy.        Right: No supraclavicular adenopathy present.        Left: No supraclavicular adenopathy present.   Neurological: She is alert and oriented to person, place, and time. No cranial nerve deficit. Coordination normal.   Skin: Skin is warm and dry. No ecchymosis, no petechiae and no rash noted. No erythema.   Psychiatric: She has a normal mood and affect.             CT a/p w/contrast 11/29/2018   1. No acute abnormality identified within the abdomen and pelvis.    2.  There are a few nonspecific prominent lymph nodes in the upper abdomen including a periesophageal lymph node which measures 1.0 cm in short axis diameter.    3.  Significant abdominal aortic atherosclerosis and abdominal aorta ectasia.    4.  Additional findings as above.    PET/CT 1/26/2018   1.  Intense FDG uptake within the known right infrahilar lung mass, compatible with malignancy.  It is unclear if this represents primary lung malignancy or metastatic breast cancer.    2.  Intensely hypermetabolic right axillary, retropectoral, and lower right cervical lymph nodes, compatible with metastases.    3.  Abnormal FDG uptake along right breast skin thickening, new from prior chest CTA and mammogram.  This may relate to localized  edema/inflammation, though correlation with physical exam and mammography are recommended to exclude underlying inflammatory carcinoma.      MRI Brain w w/out contrast 2/9/2018  1.  No evidence of intracranial metastases.  2.  Sinus disease       Rt axillary LN bx at outside facility- on 1/16/2018 at [facility]  Pathology revealed metastatic poorly differentiated carcinoma of unknown primary  site 1/16/2018 at [facility]  TTF-1 negative, napsin negative  , cytokeratin 7 positive, cytokeratin 20 negative, p63 negative, cytokeratin 5/6 focal dim positivity    LUNG BIOPSY 1/31/2018  Pathology revealed benign lung tissue         SPECIMEN  1) Right Lung Bx 2/14/2018  Supplemental Diagnosis  Immunohistochemical stains show strong nuclear staining for p63 in essentially all tumor cells and very strong  cytoplasmic and membrane staining for CK5/6, also in essentially all tumor cells. TTF-1 and CK7 are negative  within tumor cells but do stain native pulmonary elements present within the biopsy. A stain for mucicarmine is  negative. Positive and negative controls function appropriately.  Final diagnosis: Specimen submitted as right lung biopsy  -Squamous cell carcinoma  (Electronically Signed: 2018-02-20 09:12:07 )  Diagnosed by: Phong Thompson  FINAL PATHOLOGIC DIAGNOSIS  Fragments of pulmonary parenchyma (submitted as right lung biopsy):    FINAL PATHOLOGIC DIAGNOSIS 1. Lymph node, right axilla, biopsy, review of 10 outside slides [facility], JS [phone],  collected January 11, 2018: Metastatic poorly differentiated carcinoma (see comment).  The histologic section shows fibrous stroma infiltrated by nest of poorly differentiated malignant cells including  occasional dyskeratotic cells morphologically suggestive of metastatic squamous cell carcinoma.  Immunohistochemical stains are nonspecific; the cells are positive for cytokeratin 7 and cytokeratin 5/6 (focal) and  negative  for cytokeratin 20, TTF-1, p63, GCDFP, mammoglobin, and CEA        PET/CT 2/21/2019  Increased size and irregularity of the right axillary lymph node with increased FDG avidity.  Although this would be atypical in location for lung carcinoma, the increased size and avidity must be concerning for metastatic disease in this patient with history of squamous cell lung cancer.     US Rt breast and mammo 2/26/2019  Impression:  Right  Lymph Node: Right axilla lymph node. Assessment: 4 - Suspicious finding. Biopsy is recommended.      BI-RADS Category:   Right: 4 - Suspicious  Overall: 4 - Suspicious     Recommendation:  Biopsy is recommended. Biopsy of the lymph node measuring 1.4 x 1.1 x 1.3 recommended , the one with the round shape configuration, corresponding to the most recent PET CT finding.         Pathology 3/11/2019   FINAL PATHOLOGIC DIAGNOSIS  Right axilla, mass, core biopsy:  Positive for poorly differentiated carcinoma.  See comment.  Comment:  The biopsy from the right axilla mass shows a poorly differentiated carcinoma in background lymphoid tissue  with enlarged cells showing increased nuclear size, prominent nucleoli, moderate amounts of cytoplasm and mitotic  figures. Immunostains are completed and reveal the tumor cells to stain positively with cytokeratin AE 1/AE3, CK  5/6 and CK 7. The tumor cells are negative for CK 20, Estrogen receptor, p63 and TTF-1. All stains have  satisfactory positive and negative controls. The patient's prior cases will be reviewed and an additional stain for  JUAREZ-3 is pending in an attempt to pinpoint the primary site of this malignancy and results will follow in a  supplemental report.      Supplemental Diagnosis  3/11/2019  The current axillary mass is compared to the patient's prior right lung biopsy (case number [id number]) and the  current axillary mass is morphologically similar to the lung malignancy. Also, the current axillary mass is compared  to the patient's prior  breast resection (Case number [id number]) and appears similar as well. P63 is negative in the  [id number] tumor and CK 5/6 shows scattered positive staining in the [id number] tumor.  Immunostain for JUAREZ-3 is completed and shows only rare weak to moderate staining within the tumor cell nuclei  with satisfactory positive and negative controls. This stain is positive in the surrounding lymphocytes. This staining  pattern is non-specific and does not definitively differentiate between a lung and breast primary malignancy in this  right axilla mass biopsy. The staining profile of the current axillary mass match more closely with the previous  breast carcinoma (due to the p63 negativity in both the 2014 breast cancer and the current axillary mass lesion but  p63 positivity in the lung mass from 2018).  This staining profile, together with the comparison with the patient's prior breast tumor and prior lung tumor supports  a diagnosis of poorly differentiated carcinoma and the malignancy appears morphologically similar to the prior  malignancies from both sites, but the staining profile in this small sample from the axillary mass more closely  correlates with the prior breast malignancy. Pathologic subclassification of this malignancy's primary location is not  definitive and clinical correlation is recommended definitively decide on possible primary location in this patient's  axillary mass sample.  Supplemental (2):  Additional immunohistochemical staining for progesterone receptor and HER2 are completed per clinician request  with following results:  Progesterone receptor: Negative; 0% nuclear tumor cell staining.  HER2: Negative; stain score = 0.  Supplemental (3):  [place] DIAGNOSIS:  FINAL DIAGNOSIS  Breast, right, needle core biopsy ([id number]; 06/17/2014): Invasive ductal carcinoma, Jaiden grade III (of  III), with focal micropapillary features.  Immunohistochemical stains performed at the referring institution show  that the tumor cells have the following  phenotype: - Estrogen receptor: Negative (0% tumor cells staining). - Progesterone receptor: Negative (0% tumor  cells staining). - HER2: Negative (score 0).  Lymph nodes, right, sentinel biopsy and lumpectomy ([id number]; 07/11/2014):  1. Right sentinel lymph node: A single (1) lymph node is negative for metastatic carcinoma.  Immunohistochemical stains performed by the referring institution and reviewed at [facility] for cytokeratin are  negative, confirming the diagnosis.  2. Right breast: Invasive ductal carcinoma with micropapillary features, per report 11.5 mm in greatest dimension.  Foci suspicious for lymphovascular invasion identified. Margins of resection are free of tumor.  Immunohistochemical stains performed by the referring institution and reviewed at [facility] show that the tumor  cells are negative for p63 and show patchy positivity for CK 5/6.  Lung, right, needle core biopsy ([id number]; 02/14/2018): Squamous cell carcinoma.    Immunohistochemical stains performed by the referring institution show the tumor cells are strongly positive for p63  and CK 5/6, while negative for TTF-1 and CK7. These result support the diagnosis. Axilla, right, needle core biopsy  ([id number]; 03/11/2019): Lymph node positive for metastatic carcinoma, most consistent with breast primary  (see comment).  Immunohistochemical stains performed by the referring institution and reviewed at [facility] show that the tumor  cells are negative for p63, focally positive for CK 5/6, focally and weakly positive for JUAREZ-3, and strongly positive  for CK7. They are also negative for estrogen receptor, progesterone receptor, and HER2 JAM (score 0).  COMMENT:  Thank you for allowing me to review the case of this 73-year-old lady who recently underwent needle core biopsies  of a right axillary mass and who has a previous diagnosis of an invasive ductal carcinoma of the right breast, as well  as  a squamous cell carcinoma of the lung. I am reviewing this case because of my special interest in breast  pathology.  I agree with your interpretation of this case. In my opinion, the lung tumor corresponds to a squamous cell  carcinoma and appears to be unrelated to the invasive ductal carcinoma of the breast. The axillary mass, in my  opinion, corresponds to metastases of the breast primary. Although it is a poorly differentiated carcinoma, the  immunophenotype is most consistent with the one that the breast primary exhibited, and is inconsistent with a  metastatic squamous cell carcinoma. I did share the case with Dr. Anabel Stone, one of our pulmonary pathologists,  and she concurs with this interpretation.  Note: Report attached.  Performing location:  [address] [address]      LYMPH NODE, RIGHT AXILLA, BIOPSY: 6/16/21   - Metastatic poorly-differentiated carcinoma, consistent with breast primary  -ER, MA, and HER2 immunohistochemical hormonal markers are also negative  PD-L1 (22C3) SemiQuant IHC, Manual  Interpretation  Right axillary lymph node , specimen for PD-L1 immunohistochemistry studies (clone 22C3, [employer], [place], [place]; using a proprietary detection system) ([id number]):  Reported carcinoma site of origin: Breast  Result: The percent of PD-L1 positive cells based upon the total number of tumor cells (combined positive  score, CPS) is greater than or equal to 10.  Interpretation: Studies suggest that positive PD-L1 immunohistochemistry in tumor cells and/or tumorassociated  immune cells may predict tumor response to therapy with immune checkpoint inhibitors. This  result should not be used as the sole factor in determining treatment, as other factors (for example, tumor  mutation burden, and microsatellite instability) have been also studied as predictive markers.          CT neck/chest/abd/pelvis w/contrast 4/26/2019    The previously described right hilar mass is no longer visible.  There is persistent volume loss in the right middle lobe this appears similar to the prior PET-CT February 21, 2019.    Persistent abnormal right axillary node today measuring about 15 mm compared 18 mm prior.  The positioning of the adjacent nodes is slightly different likely accounting for the slight difference in measurement.    Cluster of tree-in-bud nodular densities left lower lobe series 2, image 93.  This may be related to small airways disease though serial follow-up suggested.      PET/CT 6/20/2019   New and worsening hypermetabolic lymph nodes concerning for metastatic breast cancer as described above.  Tissue sampling would be required for definitive diagnosis.      2-D echo 6/27/2019   · Normal left ventricular systolic function. The estimated ejection fraction is 55%  · Concentric left ventricular remodeling.  · Normal LV diastolic function.  · Normal right ventricular systolic function.  · Moderate left atrial enlargement.  · Mild right atrial enlargement.  · Mild aortic regurgitation.  · Mild mitral regurgitation.  · Mild tricuspid regurgitation.  · Normal central venous pressure (3 mm Hg).  · The estimated PA systolic pressure is 25 mm Hg      PET/CT 9/19/2019   Favorable response to therapy with interval decrease in size and uptake of several right axillary lymph nodes and a supraclavicular lymph node.  Mild residual activity may indicate viable tumor.    No new hypermetabolic findings worrisome for malignancy.    PET/CT 2/28/2020  1.  No evidence of hypermetabolic tumor.  Continued improvement of the right axilla status post adjuvant radiation.    2.  No new hypermetabolic lesions      CTA 6/17/2020  1. No evidence of pulmonary embolus. No aortic dissection.   2. There is no evidence for new or progressive disease in the chest as compared to PET/CT 6/20/2019 in this patient with history of right breast cancer and right lung  neoplasm. The patient does have a more recent PET/CT 2/28/2020 from an outside facility per the electronic medical record although images are not available for direct comparison. Correlation with these images may be beneficial. Continued long-term surveillance recommended.  3. Stable appearance of the treated tumor in the right hilum/middle lobe.  4. Stable treated right breast cancer and axillary adenopathy without evidence for developing breast mass or new right axillary adenopathy.  5. Stable tiny nodularity/tree-in-bud densities in the left lung, probably postinfectious or inflammatory.  6. Wall thickening of the esophagus may be correlated for esophagitis. Possible tiny hiatus hernia.  7. Slight bronchial wall thickening may be correlated for bronchitis.  8. Mild to moderate emphysema as before.  9. Reflux of contrast into the IVC and hepatic veins is nonspecific but may be correlated for elevated right heart pressures.  10. Coronary calcifications and/or stents similar to prior.    Echo 5/21/2020  Technically difficult study.   Normal left ventricular systolic function.   Left ventricular ejection fraction is estimated at 55%.   Severe mitral annular calcification.   Mild mitral valve regurgitation.   Aortic valve sclerosis without stenosis or regurgitation.     PFTs 6/17/2020  Spirometry reveals a very severe obstructive lung defect, which does not significantly improve post bronchodilators. Lung volumes revealed air trapping. Diffusing capacity was moderately impaired.        Del 6/26/2020  BI-RADS Category:   Overall: 2 - Benign      PET/CT 10/23/2020   Impression:     Stable mildly hypermetabolic right axillary lymph node/nodular density contralateral to the site of injection.  Differential considerations include metastatic lymph node, reactive lymph node from benign right upper extremity process, and fat necrosis.  Recommend physical examination of the upper extremity and consideration of ultrasound for  further evaluation of the node/nodule and possible image guided biopsy.  Otherwise, attention on follow-up.     Otherwise, no other hypermetabolic disease identified      Mammo diagnostic right /US 11/4/2020   Impression:   1. No suspicious finding either on mammogram or ultrasound at the site of the palpable lump in the right breast at 10:00 o'clock. A bilateral mammogram is recommended in June 2020 to return to the patient to annual screening.   2. Redemonstration of the morphologically abnormal lymph node in the right axilla that contains a biopsy clip, compatible with the known recurrence metastatic breast cancer that has been treated with chemotherapy. The appearance of this lymph node is unchanged from 06/26/2020 and overall appears smaller when compared to 03/11/2019.     Abd US 12/11/2020   Impression:     1. Echogenic liver likely representing hepatic steatosis.  2. Right upper quadrant ultrasound otherwise is unremarkable.     PET/CT 10/14/21     Impression:     1.  No definite evidence of hypermetabolic tumor.  Interval resolution of hypermetabolic right axillary lymph nodes.  No new hypermetabolic findings.     2.  Persistent low-grade diffuse cutaneous uptake which is nonspecific.    MRI shoulder w w/out contrast 1/26/22   Impression:     Mild edema and postcontrast enhancement at the myotendinous junction of the supraspinatus and infraspinatus, it is nonspecific and could be due to degenerative change or tendinosis.     Small area of interstitial tear at the footprint insertion of the infraspinatus tendon.     No large rotator cuff tear.     No advanced degenerative change.     No osseous lesions.     PET/CT 1/26/22  Impression:In this patient with breast cancer, there is no evidence of hypermetabolic tumor to suggest recurrent or metastatic disease.   Less extensive but, nonspecific, low-grade diffuse cutaneous uptake of the right breast.       PET/CT 4/27/22  Impression:     1.  No FDG avid disease  to suggest recurrence or metastatic disease.     Unchanged right breast skin thickening with mild FDG uptake.       Assessment:       1. Recurrent breast cancer, unspecified laterality    2. 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 Chronic obstructive pulmonary disease, unspecified COPD type    4. Hypomagnesemia        Plan:     1.,2.   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  S/p adjuvant RT completed 9/2014 Pt declined adjuvant chemo  Pt  hospitalized 11/2017 with acute-on-chronic  resp failure  Abnormal CT imaging revealing Large right hilar mass with involvement of  adjacent segmental pulmonary arterial branches and bronchi with associated postobstructive atelectasis  and involvement of mediastinal and axillary adenopathy   S/p rt axillary LN bx ( outside facility) - metastatic poorly differentiated carcinoma of unknown primary  site  status post  lung biopsy 1/31/2017 -benign lung tissue  PET/CT 1/26/2018   Intense FDG uptake within the known right infrahilar lung mass, compatible with malignancy.   Intensely hypermetabolic right axillary, retropectoral, and lower right cervical lymph nodes, compatible with metastases.  Abnormal FDG uptake along right breast skin thickening, new from prior chest CTA and mammogram.    Repeat lung bx 2/14/2018 revealed Squamous Cell CA lung PD-L1 10% EGFR NEGALK NEG  Pt treated for advanced squamous cell CA of lung   She s/p  cycle 6 of carboplatin/Abraxane Day1 completed 7/2/2018 ( day 15 held due to prolonged cytopenias)  She completed therapy with near CR     PET/CT 2/21/2019 showed increased size and irregularity of the right axillary lymph node with increased FDG avidity     MAMMO/US rt breast 3/2019  reveals suspicious findings rt axilla LN.  Biopsy of the lymph node measuring 1.4 x 1.1 x 1.3     Pt s/p  rt axillary LN bxPositive for poorly differentiated carcinoma.-  likely breast primary ERneg PRneg Her 2 neg    Outside review of specimen(s) revealed  lung tumor corresponds to a squamous cell carcinoma and appears to be unrelated to the invasive ductal carcinoma of the breast. It was determined The axillary mass, in my opinion, corresponded  to metastases of the breast primary. Although it is a poorly differentiated carcinoma, theimmunophenotype is most consistent with the one that the breast primary exhibited, and is inconsistent with a metastatic squamous cell carcinoma.     CT imaging 4/26/2019 persistent rt axillary LAD, no other sites of disease     Lymph node biopsy 3/11/2019 Right axilla, mass, core biopsy:Positive for poorly differentiated carcinoma.favor breast primary     PET/CT 6/20/2019  New and worsening hypermetabolic lymph nodes concerning for metastatic breast cancer     Previously Discussed  imaging findings in detail with patient which reveals new and worsening hypermetabolic melena metabolic lymph nodes including hypermetabolic supraclavicular node.  Therefore, at treatment option will be systemic chemotherapy in light of the recent imaging findings.  She will be followed by her surgical oncologist Dr. Christopher      IT was determined to proceed with systemic chemotherapy   S/p  AC [id number] x 3 wks x 4 wks completed 9/6/2019   ( pt has not received in past)      PET/CT 9/19/2019 shows disease response with interval decrease in size and uptake of several right axillary lymph nodes and a supraclavicular lymph node.  Mild residual activity may indicate viable tumor.No new hypermetabolic findings worrisome for malignancy.    Pt followed by  Breast Surgery and plan was  for possible   ALND. Pt with Recurrent cancer in her right axilla and right supraclavicular fossa.   She completed chemotherapy 9/6/2019 with near CR  It was determined  Radiation will be given to the original areas of hypermetabolic activity in the right axilla and right supraclavicular region    Pt  completed  RT 12/16/2019     PET/CT 1/14/21 shows   New right axillary hypermetabolic lymph nodes with preserved normal architecture suggestive of reactive nodes.  Stable appearing previously identified hypermetabolic lymph node with mild increase in surrounding fat stranding.        Findings previously d/w with treating breast surgeon and it was determined to cont to monitor and close f/u as pt is heavily pre treated, has received RT to site   Pt acknowledged understanding and agreeable with plan     PET/CT imaging  5/20/21 shows Continued increase in both size and hypermetabolic activity of right axillary level 1 lymph nodes a new, mildly hypermetabolic cutaneous thickening of the right breast.     Right breast, skin, punch biopsy:Negative for carcinoma    LYMPH NODE, RIGHT AXILLA, BIOPSY: 6/16/21   - Metastatic poorly-differentiated carcinoma, consistent with breast primary triple neg  PD-L1 immunohistochemistry studies(combined positive score, CPS) is greater than or equal to 10   PET/CT showed  No definite evidence of hypermetabolic tumor.  Interval resolution of hypermetabolic right axillary lymph nodes.  No new hypermetabolic findings.     S/p C12 3/16/22   Last  PET/CT imaging reviewed- shows continued good response and  there is no evidence of hypermetabolic tumor to suggest recurrent or metastatic disease.  Proceed with  next cycle     3. F/b Pulmonology  Pt on supp 02 at night   Cont MDI and O2 as prescribed     4.. IV Mag 2gr today       Chemo today   Cbc,cmp, Mag prior to chemo in 3 wks and prior to f/u 6 wks    Advance Care Planning     Power of   I previously  initiated the process of advance care planning today and explained the importance of this process to the patient.  I introduced the concept of advance directives to the patient, as well. Then the patient received detailed information about the importance of designating a Health Care Power of  (HCPOA). She was also instructed to  communicate with this person about their wishes for future healthcare, should she become sick and lose decision-making capacity. The patient has previously appointed a HCPOA. After our discussion, the patient has decided to complete a HCPOA and has appointed her son and niece and  I spent a total time of 16 minutes discussing this issue with the patient.         Cc: Swetha Katz M.D.         MD Tory Roberson MD Raymond Gould, MD

## 2022-05-03 ENCOUNTER — LAB VISIT (OUTPATIENT)
Dept: LAB | Facility: HOSPITAL | Age: 77
End: 2022-05-03
Attending: INTERNAL MEDICINE
Payer: MEDICARE

## 2022-05-03 ENCOUNTER — OFFICE VISIT (OUTPATIENT)
Dept: HEMATOLOGY/ONCOLOGY | Facility: CLINIC | Age: 77
End: 2022-05-03
Payer: MEDICARE

## 2022-05-03 ENCOUNTER — INFUSION (OUTPATIENT)
Dept: INFUSION THERAPY | Facility: HOSPITAL | Age: 77
End: 2022-05-03
Attending: INTERNAL MEDICINE
Payer: MEDICARE

## 2022-05-03 VITALS
RESPIRATION RATE: 16 BRPM | OXYGEN SATURATION: 97 % | WEIGHT: 161.63 LBS | DIASTOLIC BLOOD PRESSURE: 58 MMHG | HEIGHT: 63 IN | SYSTOLIC BLOOD PRESSURE: 121 MMHG | TEMPERATURE: 98 F | OXYGEN SATURATION: 98 % | DIASTOLIC BLOOD PRESSURE: 65 MMHG | HEART RATE: 68 BPM | HEART RATE: 63 BPM | TEMPERATURE: 98 F | SYSTOLIC BLOOD PRESSURE: 117 MMHG | BODY MASS INDEX: 28.64 KG/M2

## 2022-05-03 DIAGNOSIS — C50.919 METASTATIC BREAST CANCER: Primary | ICD-10-CM

## 2022-05-03 DIAGNOSIS — J44.9 CHRONIC OBSTRUCTIVE PULMONARY DISEASE, UNSPECIFIED COPD TYPE: ICD-10-CM

## 2022-05-03 DIAGNOSIS — D84.821 IMMUNODEFICIENCY DUE TO CHEMOTHERAPY: ICD-10-CM

## 2022-05-03 DIAGNOSIS — Z17.1 MALIGNANT NEOPLASM OF CENTRAL PORTION OF RIGHT BREAST IN FEMALE, ESTROGEN RECEPTOR NEGATIVE: ICD-10-CM

## 2022-05-03 DIAGNOSIS — Z79.899 IMMUNODEFICIENCY DUE TO CHEMOTHERAPY: ICD-10-CM

## 2022-05-03 DIAGNOSIS — C50.919 METASTATIC BREAST CANCER: ICD-10-CM

## 2022-05-03 DIAGNOSIS — C50.111 MALIGNANT NEOPLASM OF CENTRAL PORTION OF RIGHT BREAST IN FEMALE, ESTROGEN RECEPTOR NEGATIVE: ICD-10-CM

## 2022-05-03 DIAGNOSIS — E83.42 HYPOMAGNESEMIA: ICD-10-CM

## 2022-05-03 DIAGNOSIS — E03.2 HYPOTHYROIDISM DUE TO DRUGS: ICD-10-CM

## 2022-05-03 DIAGNOSIS — C34.91 SQUAMOUS CELL CARCINOMA OF RIGHT LUNG: ICD-10-CM

## 2022-05-03 DIAGNOSIS — C50.919 RECURRENT BREAST CANCER, UNSPECIFIED LATERALITY: Primary | ICD-10-CM

## 2022-05-03 DIAGNOSIS — T45.1X5A IMMUNODEFICIENCY DUE TO CHEMOTHERAPY: ICD-10-CM

## 2022-05-03 LAB
ALBUMIN SERPL BCP-MCNC: 4 G/DL (ref 3.5–5.2)
ALP SERPL-CCNC: 51 U/L (ref 55–135)
ALT SERPL W/O P-5'-P-CCNC: 12 U/L (ref 10–44)
ANION GAP SERPL CALC-SCNC: 10 MMOL/L (ref 8–16)
AST SERPL-CCNC: 14 U/L (ref 10–40)
BILIRUB SERPL-MCNC: 0.7 MG/DL (ref 0.1–1)
BUN SERPL-MCNC: 21 MG/DL (ref 8–23)
CALCIUM SERPL-MCNC: 9.4 MG/DL (ref 8.7–10.5)
CHLORIDE SERPL-SCNC: 104 MMOL/L (ref 95–110)
CO2 SERPL-SCNC: 24 MMOL/L (ref 23–29)
CREAT SERPL-MCNC: 1 MG/DL (ref 0.5–1.4)
ERYTHROCYTE [DISTWIDTH] IN BLOOD BY AUTOMATED COUNT: 13.8 % (ref 11.5–14.5)
EST. GFR  (AFRICAN AMERICAN): >60 ML/MIN/1.73 M^2
EST. GFR  (NON AFRICAN AMERICAN): 55 ML/MIN/1.73 M^2
GLUCOSE SERPL-MCNC: 115 MG/DL (ref 70–110)
HCT VFR BLD AUTO: 41.7 % (ref 37–48.5)
HGB BLD-MCNC: 13.5 G/DL (ref 12–16)
IMM GRANULOCYTES # BLD AUTO: 0.05 K/UL (ref 0–0.04)
MAGNESIUM SERPL-MCNC: 1.5 MG/DL (ref 1.6–2.6)
MCH RBC QN AUTO: 30 PG (ref 27–31)
MCHC RBC AUTO-ENTMCNC: 32.4 G/DL (ref 32–36)
MCV RBC AUTO: 93 FL (ref 82–98)
NEUTROPHILS # BLD AUTO: 6.7 K/UL (ref 1.8–7.7)
PLATELET # BLD AUTO: 188 K/UL (ref 150–450)
PMV BLD AUTO: 9.5 FL (ref 9.2–12.9)
POTASSIUM SERPL-SCNC: 3.7 MMOL/L (ref 3.5–5.1)
PROT SERPL-MCNC: 7.3 G/DL (ref 6–8.4)
RBC # BLD AUTO: 4.5 M/UL (ref 4–5.4)
SODIUM SERPL-SCNC: 138 MMOL/L (ref 136–145)
TSH SERPL DL<=0.005 MIU/L-ACNC: 1.47 UIU/ML (ref 0.4–4)
WBC # BLD AUTO: 8.93 K/UL (ref 3.9–12.7)

## 2022-05-03 PROCEDURE — 1101F PR PT FALLS ASSESS DOC 0-1 FALLS W/OUT INJ PAST YR: ICD-10-PCS | Mod: CPTII,S$GLB,, | Performed by: INTERNAL MEDICINE

## 2022-05-03 PROCEDURE — 3288F PR FALLS RISK ASSESSMENT DOCUMENTED: ICD-10-PCS | Mod: CPTII,S$GLB,, | Performed by: INTERNAL MEDICINE

## 2022-05-03 PROCEDURE — 3078F PR MOST RECENT DIASTOLIC BLOOD PRESSURE < 80 MM HG: ICD-10-PCS | Mod: CPTII,S$GLB,, | Performed by: INTERNAL MEDICINE

## 2022-05-03 PROCEDURE — 84443 ASSAY THYROID STIM HORMONE: CPT | Performed by: INTERNAL MEDICINE

## 2022-05-03 PROCEDURE — A4216 STERILE WATER/SALINE, 10 ML: HCPCS | Performed by: INTERNAL MEDICINE

## 2022-05-03 PROCEDURE — 96413 CHEMO IV INFUSION 1 HR: CPT

## 2022-05-03 PROCEDURE — 1101F PT FALLS ASSESS-DOCD LE1/YR: CPT | Mod: CPTII,S$GLB,, | Performed by: INTERNAL MEDICINE

## 2022-05-03 PROCEDURE — 3074F SYST BP LT 130 MM HG: CPT | Mod: CPTII,S$GLB,, | Performed by: INTERNAL MEDICINE

## 2022-05-03 PROCEDURE — 1126F AMNT PAIN NOTED NONE PRSNT: CPT | Mod: CPTII,S$GLB,, | Performed by: INTERNAL MEDICINE

## 2022-05-03 PROCEDURE — 99214 PR OFFICE/OUTPT VISIT, EST, LEVL IV, 30-39 MIN: ICD-10-PCS | Mod: S$GLB,,, | Performed by: INTERNAL MEDICINE

## 2022-05-03 PROCEDURE — 1159F PR MEDICATION LIST DOCUMENTED IN MEDICAL RECORD: ICD-10-PCS | Mod: CPTII,S$GLB,, | Performed by: INTERNAL MEDICINE

## 2022-05-03 PROCEDURE — 3078F DIAST BP <80 MM HG: CPT | Mod: CPTII,S$GLB,, | Performed by: INTERNAL MEDICINE

## 2022-05-03 PROCEDURE — 1157F ADVNC CARE PLAN IN RCRD: CPT | Mod: CPTII,S$GLB,, | Performed by: INTERNAL MEDICINE

## 2022-05-03 PROCEDURE — 99999 PR PBB SHADOW E&M-EST. PATIENT-LVL IV: CPT | Mod: PBBFAC,,, | Performed by: INTERNAL MEDICINE

## 2022-05-03 PROCEDURE — 96367 TX/PROPH/DG ADDL SEQ IV INF: CPT

## 2022-05-03 PROCEDURE — 63600175 PHARM REV CODE 636 W HCPCS: Performed by: INTERNAL MEDICINE

## 2022-05-03 PROCEDURE — 25000003 PHARM REV CODE 250: Performed by: INTERNAL MEDICINE

## 2022-05-03 PROCEDURE — 1126F PR PAIN SEVERITY QUANTIFIED, NO PAIN PRESENT: ICD-10-PCS | Mod: CPTII,S$GLB,, | Performed by: INTERNAL MEDICINE

## 2022-05-03 PROCEDURE — 1157F PR ADVANCE CARE PLAN OR EQUIV PRESENT IN MEDICAL RECORD: ICD-10-PCS | Mod: CPTII,S$GLB,, | Performed by: INTERNAL MEDICINE

## 2022-05-03 PROCEDURE — 99499 RISK ADDL DX/OHS AUDIT: ICD-10-PCS | Mod: S$GLB,,, | Performed by: INTERNAL MEDICINE

## 2022-05-03 PROCEDURE — 3044F HG A1C LEVEL LT 7.0%: CPT | Mod: CPTII,S$GLB,, | Performed by: INTERNAL MEDICINE

## 2022-05-03 PROCEDURE — 99999 PR PBB SHADOW E&M-EST. PATIENT-LVL IV: ICD-10-PCS | Mod: PBBFAC,,, | Performed by: INTERNAL MEDICINE

## 2022-05-03 PROCEDURE — 99499 UNLISTED E&M SERVICE: CPT | Mod: S$GLB,,, | Performed by: INTERNAL MEDICINE

## 2022-05-03 PROCEDURE — 3074F PR MOST RECENT SYSTOLIC BLOOD PRESSURE < 130 MM HG: ICD-10-PCS | Mod: CPTII,S$GLB,, | Performed by: INTERNAL MEDICINE

## 2022-05-03 PROCEDURE — 83735 ASSAY OF MAGNESIUM: CPT | Performed by: INTERNAL MEDICINE

## 2022-05-03 PROCEDURE — 36415 COLL VENOUS BLD VENIPUNCTURE: CPT | Performed by: INTERNAL MEDICINE

## 2022-05-03 PROCEDURE — 85027 COMPLETE CBC AUTOMATED: CPT | Performed by: INTERNAL MEDICINE

## 2022-05-03 PROCEDURE — 80053 COMPREHEN METABOLIC PANEL: CPT | Performed by: INTERNAL MEDICINE

## 2022-05-03 PROCEDURE — 1159F MED LIST DOCD IN RCRD: CPT | Mod: CPTII,S$GLB,, | Performed by: INTERNAL MEDICINE

## 2022-05-03 PROCEDURE — 3044F PR MOST RECENT HEMOGLOBIN A1C LEVEL <7.0%: ICD-10-PCS | Mod: CPTII,S$GLB,, | Performed by: INTERNAL MEDICINE

## 2022-05-03 PROCEDURE — 99214 OFFICE O/P EST MOD 30 MIN: CPT | Mod: S$GLB,,, | Performed by: INTERNAL MEDICINE

## 2022-05-03 PROCEDURE — 3288F FALL RISK ASSESSMENT DOCD: CPT | Mod: CPTII,S$GLB,, | Performed by: INTERNAL MEDICINE

## 2022-05-03 RX ORDER — SODIUM CHLORIDE 0.9 % (FLUSH) 0.9 %
10 SYRINGE (ML) INJECTION
Status: CANCELLED | OUTPATIENT
Start: 2022-05-03

## 2022-05-03 RX ORDER — LORAZEPAM/0.9% SODIUM CHLORIDE 100MG/0.1L
2 PLASTIC BAG, INJECTION (ML) INTRAVENOUS
Status: DISCONTINUED | OUTPATIENT
Start: 2022-05-03 | End: 2022-05-03 | Stop reason: HOSPADM

## 2022-05-03 RX ORDER — SODIUM CHLORIDE 0.9 % (FLUSH) 0.9 %
10 SYRINGE (ML) INJECTION
Status: DISCONTINUED | OUTPATIENT
Start: 2022-05-03 | End: 2022-05-03 | Stop reason: HOSPADM

## 2022-05-03 RX ORDER — HEPARIN 100 UNIT/ML
500 SYRINGE INTRAVENOUS
Status: CANCELLED | OUTPATIENT
Start: 2022-05-03

## 2022-05-03 RX ORDER — HEPARIN 100 UNIT/ML
500 SYRINGE INTRAVENOUS
Status: DISCONTINUED | OUTPATIENT
Start: 2022-05-03 | End: 2022-05-03 | Stop reason: HOSPADM

## 2022-05-03 RX ADMIN — Medication 10 ML: at 10:05

## 2022-05-03 RX ADMIN — MAGNESIUM SULFATE HEPTAHYDRATE 2 G: 500 INJECTION, SOLUTION INTRAMUSCULAR; INTRAVENOUS at 09:05

## 2022-05-03 RX ADMIN — SODIUM CHLORIDE 200 MG: 9 INJECTION, SOLUTION INTRAVENOUS at 09:05

## 2022-05-03 RX ADMIN — HEPARIN 500 UNITS: 100 SYRINGE at 10:05

## 2022-05-03 NOTE — PLAN OF CARE
Patient received 200mg keytruda and 2grams magnesium sulfate. Tolerated treatment well. VSS. AVS provided. Discharged from unit in [facility].

## 2022-05-23 RX ORDER — HEPARIN 100 UNIT/ML
500 SYRINGE INTRAVENOUS
Status: CANCELLED | OUTPATIENT
Start: 2022-05-23

## 2022-05-23 RX ORDER — SODIUM CHLORIDE 0.9 % (FLUSH) 0.9 %
10 SYRINGE (ML) INJECTION
Status: CANCELLED | OUTPATIENT
Start: 2022-05-23

## 2022-05-24 ENCOUNTER — INFUSION (OUTPATIENT)
Dept: INFUSION THERAPY | Facility: HOSPITAL | Age: 77
End: 2022-05-24
Attending: INTERNAL MEDICINE
Payer: MEDICARE

## 2022-05-24 VITALS
RESPIRATION RATE: 16 BRPM | TEMPERATURE: 98 F | SYSTOLIC BLOOD PRESSURE: 136 MMHG | HEART RATE: 77 BPM | DIASTOLIC BLOOD PRESSURE: 71 MMHG | OXYGEN SATURATION: 96 %

## 2022-05-24 DIAGNOSIS — E07.9 THYROID DISEASE: ICD-10-CM

## 2022-05-24 DIAGNOSIS — C50.919 BREAST CANCER: Primary | ICD-10-CM

## 2022-05-24 DIAGNOSIS — C50.919 METASTATIC BREAST CANCER: ICD-10-CM

## 2022-05-24 LAB
ALBUMIN SERPL BCP-MCNC: 3.7 G/DL (ref 3.5–5.2)
ALP SERPL-CCNC: 55 U/L (ref 55–135)
ALT SERPL W/O P-5'-P-CCNC: 15 U/L (ref 10–44)
ANION GAP SERPL CALC-SCNC: 9 MMOL/L (ref 8–16)
AST SERPL-CCNC: 13 U/L (ref 10–40)
BASOPHILS # BLD AUTO: 0.08 K/UL (ref 0–0.2)
BASOPHILS NFR BLD: 1.3 % (ref 0–1.9)
BILIRUB SERPL-MCNC: 0.4 MG/DL (ref 0.1–1)
BUN SERPL-MCNC: 19 MG/DL (ref 8–23)
CALCIUM SERPL-MCNC: 9.5 MG/DL (ref 8.7–10.5)
CHLORIDE SERPL-SCNC: 103 MMOL/L (ref 95–110)
CO2 SERPL-SCNC: 27 MMOL/L (ref 23–29)
CREAT SERPL-MCNC: 0.9 MG/DL (ref 0.5–1.4)
DIFFERENTIAL METHOD: ABNORMAL
EOSINOPHIL # BLD AUTO: 0.2 K/UL (ref 0–0.5)
EOSINOPHIL NFR BLD: 3 % (ref 0–8)
ERYTHROCYTE [DISTWIDTH] IN BLOOD BY AUTOMATED COUNT: 13.5 % (ref 11.5–14.5)
EST. GFR  (AFRICAN AMERICAN): >60 ML/MIN/1.73 M^2
EST. GFR  (NON AFRICAN AMERICAN): >60 ML/MIN/1.73 M^2
GLUCOSE SERPL-MCNC: 111 MG/DL (ref 70–110)
HCT VFR BLD AUTO: 37.6 % (ref 37–48.5)
HGB BLD-MCNC: 12.2 G/DL (ref 12–16)
IMM GRANULOCYTES # BLD AUTO: 0.04 K/UL (ref 0–0.04)
IMM GRANULOCYTES NFR BLD AUTO: 0.7 % (ref 0–0.5)
LYMPHOCYTES # BLD AUTO: 1.2 K/UL (ref 1–4.8)
LYMPHOCYTES NFR BLD: 19.9 % (ref 18–48)
MCH RBC QN AUTO: 29.8 PG (ref 27–31)
MCHC RBC AUTO-ENTMCNC: 32.4 G/DL (ref 32–36)
MCV RBC AUTO: 92 FL (ref 82–98)
MONOCYTES # BLD AUTO: 0.6 K/UL (ref 0.3–1)
MONOCYTES NFR BLD: 9.5 % (ref 4–15)
NEUTROPHILS # BLD AUTO: 4 K/UL (ref 1.8–7.7)
NEUTROPHILS NFR BLD: 65.6 % (ref 38–73)
NRBC BLD-RTO: 0 /100 WBC
PLATELET # BLD AUTO: 183 K/UL (ref 150–450)
PMV BLD AUTO: 12.1 FL (ref 9.2–12.9)
POTASSIUM SERPL-SCNC: 3.5 MMOL/L (ref 3.5–5.1)
PROT SERPL-MCNC: 7 G/DL (ref 6–8.4)
RBC # BLD AUTO: 4.09 M/UL (ref 4–5.4)
SODIUM SERPL-SCNC: 139 MMOL/L (ref 136–145)
TSH SERPL DL<=0.005 MIU/L-ACNC: 1.81 UIU/ML (ref 0.4–4)
WBC # BLD AUTO: 6.02 K/UL (ref 3.9–12.7)

## 2022-05-24 PROCEDURE — 80053 COMPREHEN METABOLIC PANEL: CPT | Performed by: INTERNAL MEDICINE

## 2022-05-24 PROCEDURE — 25000003 PHARM REV CODE 250: Performed by: INTERNAL MEDICINE

## 2022-05-24 PROCEDURE — 84443 ASSAY THYROID STIM HORMONE: CPT | Performed by: INTERNAL MEDICINE

## 2022-05-24 PROCEDURE — 63600175 PHARM REV CODE 636 W HCPCS: Mod: JG | Performed by: INTERNAL MEDICINE

## 2022-05-24 PROCEDURE — 85025 COMPLETE CBC W/AUTO DIFF WBC: CPT | Performed by: INTERNAL MEDICINE

## 2022-05-24 PROCEDURE — 96413 CHEMO IV INFUSION 1 HR: CPT

## 2022-05-24 PROCEDURE — A4216 STERILE WATER/SALINE, 10 ML: HCPCS | Performed by: INTERNAL MEDICINE

## 2022-05-24 RX ORDER — HEPARIN 100 UNIT/ML
500 SYRINGE INTRAVENOUS
Status: DISCONTINUED | OUTPATIENT
Start: 2022-05-24 | End: 2022-05-26 | Stop reason: HOSPADM

## 2022-05-24 RX ORDER — SODIUM CHLORIDE 0.9 % (FLUSH) 0.9 %
10 SYRINGE (ML) INJECTION
Status: DISCONTINUED | OUTPATIENT
Start: 2022-05-24 | End: 2022-05-26 | Stop reason: HOSPADM

## 2022-05-24 RX ADMIN — SODIUM CHLORIDE 200 MG: 9 INJECTION, SOLUTION INTRAVENOUS at 09:05

## 2022-05-24 RX ADMIN — HEPARIN 500 UNITS: 100 SYRINGE at 10:05

## 2022-05-24 RX ADMIN — Medication 10 ML: at 10:05

## 2022-06-14 RX ORDER — SODIUM CHLORIDE 0.9 % (FLUSH) 0.9 %
10 SYRINGE (ML) INJECTION
Status: CANCELLED | OUTPATIENT
Start: 2022-06-15

## 2022-06-14 RX ORDER — HEPARIN 100 UNIT/ML
500 SYRINGE INTRAVENOUS
Status: CANCELLED | OUTPATIENT
Start: 2022-06-15

## 2022-06-15 ENCOUNTER — TELEPHONE (OUTPATIENT)
Dept: HEMATOLOGY/ONCOLOGY | Facility: CLINIC | Age: 77
End: 2022-06-15

## 2022-06-15 ENCOUNTER — LAB VISIT (OUTPATIENT)
Dept: LAB | Facility: HOSPITAL | Age: 77
End: 2022-06-15
Attending: INTERNAL MEDICINE
Payer: MEDICARE

## 2022-06-15 ENCOUNTER — OFFICE VISIT (OUTPATIENT)
Dept: HEMATOLOGY/ONCOLOGY | Facility: CLINIC | Age: 77
End: 2022-06-15
Payer: MEDICARE

## 2022-06-15 ENCOUNTER — INFUSION (OUTPATIENT)
Dept: INFUSION THERAPY | Facility: HOSPITAL | Age: 77
End: 2022-06-15
Attending: INTERNAL MEDICINE
Payer: MEDICARE

## 2022-06-15 VITALS
HEIGHT: 63 IN | DIASTOLIC BLOOD PRESSURE: 65 MMHG | OXYGEN SATURATION: 96 % | BODY MASS INDEX: 27.93 KG/M2 | TEMPERATURE: 98 F | HEART RATE: 66 BPM | WEIGHT: 157.63 LBS | SYSTOLIC BLOOD PRESSURE: 123 MMHG

## 2022-06-15 DIAGNOSIS — C50.919 METASTATIC BREAST CANCER: Primary | ICD-10-CM

## 2022-06-15 DIAGNOSIS — C50.919 RECURRENT BREAST CANCER, UNSPECIFIED LATERALITY: ICD-10-CM

## 2022-06-15 DIAGNOSIS — C34.91 SQUAMOUS CELL CARCINOMA OF RIGHT LUNG: ICD-10-CM

## 2022-06-15 DIAGNOSIS — Z79.899 IMMUNODEFICIENCY DUE TO CHEMOTHERAPY: ICD-10-CM

## 2022-06-15 DIAGNOSIS — J44.9 CHRONIC OBSTRUCTIVE PULMONARY DISEASE, UNSPECIFIED COPD TYPE: ICD-10-CM

## 2022-06-15 DIAGNOSIS — E03.2 HYPOTHYROIDISM DUE TO DRUGS: ICD-10-CM

## 2022-06-15 DIAGNOSIS — Z17.1 MALIGNANT NEOPLASM OF CENTRAL PORTION OF RIGHT BREAST IN FEMALE, ESTROGEN RECEPTOR NEGATIVE: ICD-10-CM

## 2022-06-15 DIAGNOSIS — C50.111 MALIGNANT NEOPLASM OF CENTRAL PORTION OF RIGHT BREAST IN FEMALE, ESTROGEN RECEPTOR NEGATIVE: ICD-10-CM

## 2022-06-15 DIAGNOSIS — C50.919 METASTATIC BREAST CANCER: ICD-10-CM

## 2022-06-15 DIAGNOSIS — E87.6 HYPOKALEMIA: ICD-10-CM

## 2022-06-15 DIAGNOSIS — R63.0 ANOREXIA: ICD-10-CM

## 2022-06-15 DIAGNOSIS — R53.0 NEOPLASTIC MALIGNANT RELATED FATIGUE: ICD-10-CM

## 2022-06-15 DIAGNOSIS — D84.821 IMMUNODEFICIENCY DUE TO CHEMOTHERAPY: ICD-10-CM

## 2022-06-15 DIAGNOSIS — T45.1X5A IMMUNODEFICIENCY DUE TO CHEMOTHERAPY: ICD-10-CM

## 2022-06-15 DIAGNOSIS — C50.919 RECURRENT BREAST CANCER, UNSPECIFIED LATERALITY: Primary | ICD-10-CM

## 2022-06-15 LAB
ALBUMIN SERPL BCP-MCNC: 3.8 G/DL (ref 3.5–5.2)
ALP SERPL-CCNC: 48 U/L (ref 55–135)
ALT SERPL W/O P-5'-P-CCNC: 11 U/L (ref 10–44)
ANION GAP SERPL CALC-SCNC: 11 MMOL/L (ref 8–16)
AST SERPL-CCNC: 14 U/L (ref 10–40)
BILIRUB SERPL-MCNC: 0.5 MG/DL (ref 0.1–1)
BUN SERPL-MCNC: 21 MG/DL (ref 8–23)
CALCIUM SERPL-MCNC: 9.4 MG/DL (ref 8.7–10.5)
CHLORIDE SERPL-SCNC: 107 MMOL/L (ref 95–110)
CO2 SERPL-SCNC: 23 MMOL/L (ref 23–29)
CREAT SERPL-MCNC: 1.2 MG/DL (ref 0.5–1.4)
ERYTHROCYTE [DISTWIDTH] IN BLOOD BY AUTOMATED COUNT: 14.5 % (ref 11.5–14.5)
EST. GFR  (AFRICAN AMERICAN): 51 ML/MIN/1.73 M^2
EST. GFR  (NON AFRICAN AMERICAN): 44 ML/MIN/1.73 M^2
GLUCOSE SERPL-MCNC: 121 MG/DL (ref 70–110)
HCT VFR BLD AUTO: 39.8 % (ref 37–48.5)
HGB BLD-MCNC: 13 G/DL (ref 12–16)
IMM GRANULOCYTES # BLD AUTO: 0.02 K/UL (ref 0–0.04)
MAGNESIUM SERPL-MCNC: 1.6 MG/DL (ref 1.6–2.6)
MCH RBC QN AUTO: 30.2 PG (ref 27–31)
MCHC RBC AUTO-ENTMCNC: 32.7 G/DL (ref 32–36)
MCV RBC AUTO: 92 FL (ref 82–98)
NEUTROPHILS # BLD AUTO: 3.3 K/UL (ref 1.8–7.7)
PLATELET # BLD AUTO: 210 K/UL (ref 150–450)
PMV BLD AUTO: 9.4 FL (ref 9.2–12.9)
POTASSIUM SERPL-SCNC: 3.3 MMOL/L (ref 3.5–5.1)
PROT SERPL-MCNC: 7 G/DL (ref 6–8.4)
RBC # BLD AUTO: 4.31 M/UL (ref 4–5.4)
SODIUM SERPL-SCNC: 141 MMOL/L (ref 136–145)
TSH SERPL DL<=0.005 MIU/L-ACNC: 2.11 UIU/ML (ref 0.4–4)
WBC # BLD AUTO: 5.14 K/UL (ref 3.9–12.7)

## 2022-06-15 PROCEDURE — 3074F PR MOST RECENT SYSTOLIC BLOOD PRESSURE < 130 MM HG: ICD-10-PCS | Mod: CPTII,S$GLB,, | Performed by: INTERNAL MEDICINE

## 2022-06-15 PROCEDURE — 99999 PR PBB SHADOW E&M-EST. PATIENT-LVL IV: ICD-10-PCS | Mod: PBBFAC,,, | Performed by: INTERNAL MEDICINE

## 2022-06-15 PROCEDURE — 1157F ADVNC CARE PLAN IN RCRD: CPT | Mod: CPTII,S$GLB,, | Performed by: INTERNAL MEDICINE

## 2022-06-15 PROCEDURE — 3074F SYST BP LT 130 MM HG: CPT | Mod: CPTII,S$GLB,, | Performed by: INTERNAL MEDICINE

## 2022-06-15 PROCEDURE — 3044F HG A1C LEVEL LT 7.0%: CPT | Mod: CPTII,S$GLB,, | Performed by: INTERNAL MEDICINE

## 2022-06-15 PROCEDURE — 36415 COLL VENOUS BLD VENIPUNCTURE: CPT | Performed by: INTERNAL MEDICINE

## 2022-06-15 PROCEDURE — 84443 ASSAY THYROID STIM HORMONE: CPT | Performed by: INTERNAL MEDICINE

## 2022-06-15 PROCEDURE — 1126F PR PAIN SEVERITY QUANTIFIED, NO PAIN PRESENT: ICD-10-PCS | Mod: CPTII,S$GLB,, | Performed by: INTERNAL MEDICINE

## 2022-06-15 PROCEDURE — 25000003 PHARM REV CODE 250: Performed by: INTERNAL MEDICINE

## 2022-06-15 PROCEDURE — 1101F PT FALLS ASSESS-DOCD LE1/YR: CPT | Mod: CPTII,S$GLB,, | Performed by: INTERNAL MEDICINE

## 2022-06-15 PROCEDURE — 1159F PR MEDICATION LIST DOCUMENTED IN MEDICAL RECORD: ICD-10-PCS | Mod: CPTII,S$GLB,, | Performed by: INTERNAL MEDICINE

## 2022-06-15 PROCEDURE — 3078F DIAST BP <80 MM HG: CPT | Mod: CPTII,S$GLB,, | Performed by: INTERNAL MEDICINE

## 2022-06-15 PROCEDURE — 3078F PR MOST RECENT DIASTOLIC BLOOD PRESSURE < 80 MM HG: ICD-10-PCS | Mod: CPTII,S$GLB,, | Performed by: INTERNAL MEDICINE

## 2022-06-15 PROCEDURE — 1157F PR ADVANCE CARE PLAN OR EQUIV PRESENT IN MEDICAL RECORD: ICD-10-PCS | Mod: CPTII,S$GLB,, | Performed by: INTERNAL MEDICINE

## 2022-06-15 PROCEDURE — 83735 ASSAY OF MAGNESIUM: CPT | Performed by: INTERNAL MEDICINE

## 2022-06-15 PROCEDURE — 96413 CHEMO IV INFUSION 1 HR: CPT

## 2022-06-15 PROCEDURE — 99999 PR PBB SHADOW E&M-EST. PATIENT-LVL IV: CPT | Mod: PBBFAC,,, | Performed by: INTERNAL MEDICINE

## 2022-06-15 PROCEDURE — 3288F PR FALLS RISK ASSESSMENT DOCUMENTED: ICD-10-PCS | Mod: CPTII,S$GLB,, | Performed by: INTERNAL MEDICINE

## 2022-06-15 PROCEDURE — 99215 PR OFFICE/OUTPT VISIT, EST, LEVL V, 40-54 MIN: ICD-10-PCS | Mod: S$GLB,,, | Performed by: INTERNAL MEDICINE

## 2022-06-15 PROCEDURE — 1126F AMNT PAIN NOTED NONE PRSNT: CPT | Mod: CPTII,S$GLB,, | Performed by: INTERNAL MEDICINE

## 2022-06-15 PROCEDURE — 3288F FALL RISK ASSESSMENT DOCD: CPT | Mod: CPTII,S$GLB,, | Performed by: INTERNAL MEDICINE

## 2022-06-15 PROCEDURE — 63600175 PHARM REV CODE 636 W HCPCS: Mod: JG | Performed by: INTERNAL MEDICINE

## 2022-06-15 PROCEDURE — 99499 RISK ADDL DX/OHS AUDIT: ICD-10-PCS | Mod: S$GLB,,, | Performed by: INTERNAL MEDICINE

## 2022-06-15 PROCEDURE — 3044F PR MOST RECENT HEMOGLOBIN A1C LEVEL <7.0%: ICD-10-PCS | Mod: CPTII,S$GLB,, | Performed by: INTERNAL MEDICINE

## 2022-06-15 PROCEDURE — 85027 COMPLETE CBC AUTOMATED: CPT | Performed by: INTERNAL MEDICINE

## 2022-06-15 PROCEDURE — 99215 OFFICE O/P EST HI 40 MIN: CPT | Mod: S$GLB,,, | Performed by: INTERNAL MEDICINE

## 2022-06-15 PROCEDURE — 99499 UNLISTED E&M SERVICE: CPT | Mod: S$GLB,,, | Performed by: INTERNAL MEDICINE

## 2022-06-15 PROCEDURE — 1159F MED LIST DOCD IN RCRD: CPT | Mod: CPTII,S$GLB,, | Performed by: INTERNAL MEDICINE

## 2022-06-15 PROCEDURE — 1101F PR PT FALLS ASSESS DOC 0-1 FALLS W/OUT INJ PAST YR: ICD-10-PCS | Mod: CPTII,S$GLB,, | Performed by: INTERNAL MEDICINE

## 2022-06-15 PROCEDURE — 80053 COMPREHEN METABOLIC PANEL: CPT | Performed by: INTERNAL MEDICINE

## 2022-06-15 RX ORDER — HEPARIN 100 UNIT/ML
500 SYRINGE INTRAVENOUS
Status: DISCONTINUED | OUTPATIENT
Start: 2022-06-15 | End: 2022-06-15 | Stop reason: HOSPADM

## 2022-06-15 RX ORDER — DEXAMETHASONE 4 MG/1
8 TABLET ORAL EVERY 12 HOURS
Qty: 12 TABLET | Refills: 1 | Status: SHIPPED | OUTPATIENT
Start: 2022-06-15 | End: 2022-06-18

## 2022-06-15 RX ORDER — SODIUM CHLORIDE 0.9 % (FLUSH) 0.9 %
10 SYRINGE (ML) INJECTION
Status: DISCONTINUED | OUTPATIENT
Start: 2022-06-15 | End: 2022-06-15 | Stop reason: HOSPADM

## 2022-06-15 RX ADMIN — SODIUM CHLORIDE 400 MG: 9 INJECTION, SOLUTION INTRAVENOUS at 10:06

## 2022-06-15 NOTE — PROGRESS NOTES
Subjective:       Patient ID: Ade Castellano is a 76 y.o. female.    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 The PET(+) disease in the right axilla and right supraclavicular fossa will be boosted at 200 cGy per fraction to 6000 cGy total dose.  S/p LYMPH NODE, RIGHT AXILLA, BIOPSY: 6/16/21 . Metastatic poorly-differentiated carcinoma, c/w breast primary ERnegPRneg Her2 neg  PD-L1 (22C3) IHC CPS> is greater than or equal to 10  Pembrolizumab 7/22/21 -present        Prior Hx:  77 y/o female with history of  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  And Rt  breast Haja/p  cycle 6 of carboplatin/Abraxane 7/2/2018   She has  History of Stage 1A  Rt breast cancer Grade 3, ER/FL neg , Her 2 jose maria neg s/p lumpectomy 7/11/2014 and s/p adjuvant RT 9/2014 Pt declined Adjuvant chemo.Pathology showed a 1.15 cm, grade 3 infiltrating ductal carcinoma.  One sentinel lymph node was negative for malignancy.  Margins were negative and the closest margin was 1 cm.  She was staged  pT1c  pN0 cM0 stage IA.  She declined adjuvant chemo therapy.  She completed post operative radiation therapy at 400 cGy per fraction to 4000 cGy 9/2014  Pt hospitalized 11/2017 for  acute on chronic respiratory failure requiring intubation and ventilation. Has large lung mass in right lung with probable post obstructive pneumonia resulting in COPD  exacerbation.CTA chest 11/29/2017 revealed   Large right hilar mass with involvement of adjacent segmental pulmonary arterial branches and bronchi with associated postobstructive atelectasis and volume loss in the RML  with adjacent ground glass opacity concerning for underlying neoplastic process. Mediastinal and axillary adenopathy, with a right axillary lymph node measuring up to 2.0 cm in short axis diameter.She underwent rt axillary LN bx at outside facility- on 1/16/2018 at [facility]. Pathology revealed metastatic poorly differentiated carcinoma of unknown primary site. She next underwent lung bx at [facility] 1/31/2018  benign lung tissue. Repeat Right Lung Bx 2/14/2018 Pathology reveals Squamous cell carcinoma PD-L1 10% low expression EGFR NEG ALK NEG BRAF NEG. Outside slides axillary LN specimen were reviewed/comparison to lung bx findings . She completed    cycle 6 of carboplatin/Abraxane completed 7/2018 .PET/CT  4/19/2018 revealed there has been a excellent response to therapy.  At least 90% reduction in malignant activity.PET/CT 2/21/2019 increased size and irregularity of the right axillary lymph node with increased FDG avidityMAMMO/US rt breast 2/2019 revealed suspicious findings rt axilla LN.  Right axilla, mass, core biopsy: Positive for poorly differentiated carcinoma breast primary ER neg/OH neg Her2 neg.[facility] for outside reviewIt was determined  the lung tumor corresponds to a squamous cellcarcinoma and appears to be unrelated to the invasive ductal carcinoma of the breast. The axillary mass, in myopinion, corresponds to metastases of the breast primary. Although it is a poorly differentiated carcinoma, theimmunophenotype is most consistent with the one that the breast primary exhibited, and is inconsistent with metastatic squamous cell carcinoma. She was treated with  AC ( adriamycin 60m/m2/Cytoxan 600mg/m2)  q21d x 4 cycles completed 9/6/2019 . PET/CT 9/19/2019 shows disease response  l decrease in size and uptake of several right axillary lymph nodes and a supraclavicular lymph node.  Mild residual activity may indicate viable tumor.Pt followed by Rad/Onc. She was presented at [facility] tumor board and it was determined to proceed Radiation therapy only. No ALND due to concurrent lung and supraclavicular node. She  completed  RT 12/16/2019  To right axilla and right supraclavicular region PET/CT imaging  5/20/21 shows Continued increase in both size and hypermetabolic activity of right axillary level 1 lymph nodes a new, mildly hypermetabolic cutaneous thickening of the right breast. she underwent LYMPH NODE, RIGHT AXILLA, BIOPSY: 6/16/21 . Pathology showed Metastatic poorly-differentiated carcinoma, consistent with breast primary-ERneg/ PRneg  HER2  neg  Pt started on pembrolizumab 7/2021  Pt hospitalized 4/6/22 with hypokalemia and orthostatic hypotension        Interval Hx:Pt reports progressive fatigue today   Diminished appetite and some wt loss  Mild generalized weakenss  S/p C15 pembrolizumab 5/24/22   She reports 02 level improved with supp 02  Pt waiting on portable 02  Pox 96% RA 02 sats today   She has episodes of dizziness  Taking meclizine  Chronic LOGAN-stable  Intermittent cough -stable      PET/CT 4/27/22 shows no evid to suggest recurrence or metastatic disease.     She reports pain Rt shoulder/RUE improved with OTC acetaminophen arthritis  MRI shoulder w w/out contrast 1/26/22  Mild edema and postcontrast enhancement at the myotendinous junction of the supraspinatus and infraspinatus, it is nonspecific and could be due to degenerative change or tendinosis.Small area of interstitial tear at the footprint insertion of the infraspinatus tendon.   No large rotator cuff tear.      She is followed by Dr. Katz, pulmonology    She also has been followed by GI for dysphagia  In past  She has undergone dilation      Last Mammo 6/16/21  No mammographic evidence of malignancy.BI-RADS  Category 1: Negative      PET/CT 1/26/22 No definite evidence of hypermetabolic tumor.  Interval resolution of hypermetabolic right axillary lymph nodes.  No new hypermetabolic findings.        PrevHx:She had an abnormal mammogram 6/4/2014 whichRevealed a round solid mass 6mm in rt breast.She then underwent U/S guided core bx of rt breast mass on 6/17/2014.Pathology revealed infiltrating ductal carcinoma Grade 3 with tumorPresent in thin-walled spaces suggestive of lymphatic spaces. HormoneReceptor status on tumor specimen revealed ER negative 0% ,MA negative 0% and Her 2 jose maria negative.She subsequently underwent rt segmental mastectomy and SLN bxOn 7/11/2014. Pathology of rt breast lumpectomy revealed invasiveDuctal carcinoma with micropapillary pattern ( Invasive micropapillaryCa) with max tumor dimension 11.5mm with suggestion of tumor in Thin walled spaces c/w lymphovascular involvement. SurgicalMargins free of tumor, grade 3, ER/MA neg , Her 2 jose maria neg With [place] Lymph node negative for Neoplasm. Pathologic staging [id number](i-)Her mother was diagnosed with breast cancer in her 50's/        Review of Systems   Constitutional: Positive for appetite change, fatigue and unexpected weight change. Negative for activity change and fever.   HENT: Negative for mouth sores, rhinorrhea and trouble swallowing.    Eyes: Negative for visual disturbance.   Respiratory: Positive for cough and shortness of breath. Negative for wheezing.    Cardiovascular: Negative for chest pain.   Gastrointestinal: Negative for abdominal pain and diarrhea.   Genitourinary: Negative for frequency.   Musculoskeletal: Negative for back pain.   Skin: Negative for rash.   Neurological: Positive for dizziness. Negative for headaches.   Hematological: Negative for adenopathy.   Psychiatric/Behavioral: The patient is not nervous/anxious.        Objective:          Vitals:    06/15/22 0803   BP: 123/65   BP Location: Left arm   Patient Position: Sitting  "  Pulse: 66   Temp: 97.5 °F (36.4 °C)   TempSrc: Oral   SpO2: 96%   Weight: 71.5 kg (157 lb 10.1 oz)   Height: 5' 3" (1.6 m)         Physical Exam   Constitutional: She is oriented to person, place, and time. She appears ill  HENT:   Head: Normocephalic.   Eyes: Conjunctivae and lids are normal.No scleral icterus.   Neck: Normal range of motion. Neck supple. No thyromegaly present.   Cardiovascular: Normal rate, regular rhythm and normal heart sounds.    No murmur heard.  Pulmonary/Chest: Breath sounds normal. She has no wheezes. She has no rales.   Abdominal: Soft. Bowel sounds are normal.  There is no tenderness. There is no rebound and no guarding.   Left breast- no masses or axillary LAD noted  Right breast- breast thickening inner quad    She has no cervical adenopathy.     She has rt axillary adenopathy.        Right: No supraclavicular adenopathy present.        Left: No supraclavicular adenopathy present.   Neurological: She is alert and oriented to person, place, and time. No cranial nerve deficit. Coordination normal.   Skin: Skin is warm and dry. No ecchymosis, no petechiae and no rash noted. No erythema.   Psychiatric: She has a normal mood and affect.             CT a/p w/contrast 11/29/2018   1. No acute abnormality identified within the abdomen and pelvis.    2.  There are a few nonspecific prominent lymph nodes in the upper abdomen including a periesophageal lymph node which measures 1.0 cm in short axis diameter.    3.  Significant abdominal aortic atherosclerosis and abdominal aorta ectasia.    4.  Additional findings as above.    PET/CT 1/26/2018   1.  Intense FDG uptake within the known right infrahilar lung mass, compatible with malignancy.  It is unclear if this represents primary lung malignancy or metastatic breast cancer.    2.  Intensely hypermetabolic right axillary, retropectoral, and lower right cervical lymph nodes, compatible with metastases.    3.  Abnormal FDG uptake along right " breast skin thickening, new from prior chest CTA and mammogram.  This may relate to localized edema/inflammation, though correlation with physical exam and mammography are recommended to exclude underlying inflammatory carcinoma.      MRI Brain w w/out contrast 2/9/2018  1.  No evidence of intracranial metastases.  2.  Sinus disease       Rt axillary LN bx at outside facility- on 1/16/2018 at [facility]  Pathology revealed metastatic poorly differentiated carcinoma of unknown primary  site 1/16/2018 at [facility]  TTF-1 negative, napsin negative  , cytokeratin 7 positive, cytokeratin 20 negative, p63 negative, cytokeratin 5/6 focal dim positivity    LUNG BIOPSY 1/31/2018  Pathology revealed benign lung tissue         SPECIMEN  1) Right Lung Bx 2/14/2018  Supplemental Diagnosis  Immunohistochemical stains show strong nuclear staining for p63 in essentially all tumor cells and very strong  cytoplasmic and membrane staining for CK5/6, also in essentially all tumor cells. TTF-1 and CK7 are negative  within tumor cells but do stain native pulmonary elements present within the biopsy. A stain for mucicarmine is  negative. Positive and negative controls function appropriately.  Final diagnosis: Specimen submitted as right lung biopsy  -Squamous cell carcinoma  (Electronically Signed: 2018-02-20 09:12:07 )  Diagnosed by: Phong Thompson  FINAL PATHOLOGIC DIAGNOSIS  Fragments of pulmonary parenchyma (submitted as right lung biopsy):    FINAL PATHOLOGIC DIAGNOSIS 1. Lymph node, right axilla, biopsy, review of 10 outside slides [facility], [phone],  collected January 11, 2018: Metastatic poorly differentiated carcinoma (see comment).  The histologic section shows fibrous stroma infiltrated by nest of poorly differentiated malignant cells including  occasional dyskeratotic cells morphologically suggestive of metastatic squamous cell carcinoma.  Immunohistochemical stains are  nonspecific; the cells are positive for cytokeratin 7 and cytokeratin 5/6 (focal) and  negative for cytokeratin 20, TTF-1, p63, GCDFP, mammoglobin, and CEA        PET/CT 2/21/2019  Increased size and irregularity of the right axillary lymph node with increased FDG avidity.  Although this would be atypical in location for lung carcinoma, the increased size and avidity must be concerning for metastatic disease in this patient with history of squamous cell lung cancer.     US Rt breast and mammo 2/26/2019  Impression:  Right  Lymph Node: Right axilla lymph node. Assessment: 4 - Suspicious finding. Biopsy is recommended.      BI-RADS Category:   Right: 4 - Suspicious  Overall: 4 - Suspicious     Recommendation:  Biopsy is recommended. Biopsy of the lymph node measuring 1.4 x 1.1 x 1.3 recommended , the one with the round shape configuration, corresponding to the most recent PET CT finding.         Pathology 3/11/2019   FINAL PATHOLOGIC DIAGNOSIS  Right axilla, mass, core biopsy:  Positive for poorly differentiated carcinoma.  See comment.  Comment:  The biopsy from the right axilla mass shows a poorly differentiated carcinoma in background lymphoid tissue  with enlarged cells showing increased nuclear size, prominent nucleoli, moderate amounts of cytoplasm and mitotic  figures. Immunostains are completed and reveal the tumor cells to stain positively with cytokeratin AE 1/AE3, CK  5/6 and CK 7. The tumor cells are negative for CK 20, Estrogen receptor, p63 and TTF-1. All stains have  satisfactory positive and negative controls. The patient's prior cases will be reviewed and an additional stain for  JUAREZ-3 is pending in an attempt to pinpoint the primary site of this malignancy and results will follow in a  supplemental report.      Supplemental Diagnosis  3/11/2019  The current axillary mass is compared to the patient's prior right lung biopsy (case number [id number]) and the  current axillary mass is morphologically  similar to the lung malignancy. Also, the current axillary mass is compared  to the patient's prior breast resection (Case number [id number]) and appears similar as well. P63 is negative in the  [id number] tumor and CK 5/6 shows scattered positive staining in the [id number] tumor.  Immunostain for JUAREZ-3 is completed and shows only rare weak to moderate staining within the tumor cell nuclei  with satisfactory positive and negative controls. This stain is positive in the surrounding lymphocytes. This staining  pattern is non-specific and does not definitively differentiate between a lung and breast primary malignancy in this  right axilla mass biopsy. The staining profile of the current axillary mass match more closely with the previous  breast carcinoma (due to the p63 negativity in both the 2014 breast cancer and the current axillary mass lesion but  p63 positivity in the lung mass from 2018).  This staining profile, together with the comparison with the patient's prior breast tumor and prior lung tumor supports  a diagnosis of poorly differentiated carcinoma and the malignancy appears morphologically similar to the prior  malignancies from both sites, but the staining profile in this small sample from the axillary mass more closely  correlates with the prior breast malignancy. Pathologic subclassification of this malignancy's primary location is not  definitive and clinical correlation is recommended definitively decide on possible primary location in this patient's  axillary mass sample.  Supplemental (2):  Additional immunohistochemical staining for progesterone receptor and HER2 are completed per clinician request  with following results:  Progesterone receptor: Negative; 0% nuclear tumor cell staining.  HER2: Negative; stain score = 0.  Supplemental (3):  [place] DIAGNOSIS:  FINAL DIAGNOSIS  Breast, right, needle core biopsy ([id number]; 06/17/2014): Invasive ductal carcinoma, [place] grade III (of  III), with  focal micropapillary features.  Immunohistochemical stains performed at the referring institution show that the tumor cells have the following  phenotype: - Estrogen receptor: Negative (0% tumor cells staining). - Progesterone receptor: Negative (0% tumor  cells staining). - HER2: Negative (score 0).  Lymph nodes, right, sentinel biopsy and lumpectomy ([id number]; 07/11/2014):  1. Right sentinel lymph node: A single (1) lymph node is negative for metastatic carcinoma.  Immunohistochemical stains performed by the referring institution and reviewed at [facility] for cytokeratin are  negative, confirming the diagnosis.  2. Right breast: Invasive ductal carcinoma with micropapillary features, per report 11.5 mm in greatest dimension.  Foci suspicious for lymphovascular invasion identified. Margins of resection are free of tumor.  Immunohistochemical stains performed by the referring institution and reviewed at [facility] show that the tumor  cells are negative for p63 and show patchy positivity for CK 5/6.  Lung, right, needle core biopsy ([id number]; 02/14/2018): Squamous cell carcinoma.    Immunohistochemical stains performed by the referring institution show the tumor cells are strongly positive for p63  and CK 5/6, while negative for TTF-1 and CK7. These result support the diagnosis. Axilla, right, needle core biopsy  ([id number]; 03/11/2019): Lymph node positive for metastatic carcinoma, most consistent with breast primary  (see comment).  Immunohistochemical stains performed by the referring institution and reviewed at [facility] show that the tumor  cells are negative for p63, focally positive for CK 5/6, focally and weakly positive for JUAREZ-3, and strongly positive  for CK7. They are also negative for estrogen receptor, progesterone receptor, and HER2 JAM (score 0).  COMMENT:  Thank you for allowing me to review the case of this 73-year-old lady who recently underwent needle core biopsies  of a right axillary  mass and who has a previous diagnosis of an invasive ductal carcinoma of the right breast, as well  as a squamous cell carcinoma of the lung. I am reviewing this case because of my special interest in breast  pathology.  I agree with your interpretation of this case. In my opinion, the lung tumor corresponds to a squamous cell  carcinoma and appears to be unrelated to the invasive ductal carcinoma of the breast. The axillary mass, in my  opinion, corresponds to metastases of the breast primary. Although it is a poorly differentiated carcinoma, the  immunophenotype is most consistent with the one that the breast primary exhibited, and is inconsistent with a  metastatic squamous cell carcinoma. I did share the case with Dr. Anabel Stone, one of our pulmonary pathologists,  and she concurs with this interpretation.  Note: Report attached.  Performing location:  [place] [address]      LYMPH NODE, RIGHT AXILLA, BIOPSY: 6/16/21   - Metastatic poorly-differentiated carcinoma, consistent with breast primary  -ER, ND, and HER2 immunohistochemical hormonal markers are also negative  PD-L1 (22C3) SemiQuant IHC, Manual  Interpretation  Right axillary lymph node , specimen for PD-L1 immunohistochemistry studies (clone 22C3, [employer]  [place], [place], [place]; using a proprietary detection system) ([id number]):  Reported carcinoma site of origin: Breast  Result: The percent of PD-L1 positive cells based upon the total number of tumor cells (combined positive  score, CPS) is greater than or equal to 10.  Interpretation: Studies suggest that positive PD-L1 immunohistochemistry in tumor cells and/or tumorassociated  immune cells may predict tumor response to therapy with immune checkpoint inhibitors. This  result should not be used as the sole factor in determining treatment, as other factors (for example, tumor  mutation burden, and microsatellite  instability) have been also studied as predictive markers.          CT neck/chest/abd/pelvis w/contrast 4/26/2019   The previously described right hilar mass is no longer visible.  There is persistent volume loss in the right middle lobe this appears similar to the prior PET-CT February 21, 2019.    Persistent abnormal right axillary node today measuring about 15 mm compared 18 mm prior.  The positioning of the adjacent nodes is slightly different likely accounting for the slight difference in measurement.    Cluster of tree-in-bud nodular densities left lower lobe series 2, image 93.  This may be related to small airways disease though serial follow-up suggested.      PET/CT 6/20/2019   New and worsening hypermetabolic lymph nodes concerning for metastatic breast cancer as described above.  Tissue sampling would be required for definitive diagnosis.      2-D echo 6/27/2019   · Normal left ventricular systolic function. The estimated ejection fraction is 55%  · Concentric left ventricular remodeling.  · Normal LV diastolic function.  · Normal right ventricular systolic function.  · Moderate left atrial enlargement.  · Mild right atrial enlargement.  · Mild aortic regurgitation.  · Mild mitral regurgitation.  · Mild tricuspid regurgitation.  · Normal central venous pressure (3 mm Hg).  · The estimated PA systolic pressure is 25 mm Hg      PET/CT 9/19/2019   Favorable response to therapy with interval decrease in size and uptake of several right axillary lymph nodes and a supraclavicular lymph node.  Mild residual activity may indicate viable tumor.    No new hypermetabolic findings worrisome for malignancy.    PET/CT 2/28/2020  1.  No evidence of hypermetabolic tumor.  Continued improvement of the right axilla status post adjuvant radiation.    2.  No new hypermetabolic lesions      CTA 6/17/2020  1. No evidence of pulmonary embolus. No aortic dissection.   2. There is no evidence for new or progressive disease in  the chest as compared to PET/CT 6/20/2019 in this patient with history of right breast cancer and right lung neoplasm. The patient does have a more recent PET/CT 2/28/2020 from an outside facility per the electronic medical record although images are not available for direct comparison. Correlation with these images may be beneficial. Continued long-term surveillance recommended.  3. Stable appearance of the treated tumor in the right hilum/middle lobe.  4. Stable treated right breast cancer and axillary adenopathy without evidence for developing breast mass or new right axillary adenopathy.  5. Stable tiny nodularity/tree-in-bud densities in the left lung, probably postinfectious or inflammatory.  6. Wall thickening of the esophagus may be correlated for esophagitis. Possible tiny hiatus hernia.  7. Slight bronchial wall thickening may be correlated for bronchitis.  8. Mild to moderate emphysema as before.  9. Reflux of contrast into the IVC and hepatic veins is nonspecific but may be correlated for elevated right heart pressures.  10. Coronary calcifications and/or stents similar to prior.    Echo 5/21/2020  Technically difficult study.   Normal left ventricular systolic function.   Left ventricular ejection fraction is estimated at 55%.   Severe mitral annular calcification.   Mild mitral valve regurgitation.   Aortic valve sclerosis without stenosis or regurgitation.     PFTs 6/17/2020  Spirometry reveals a very severe obstructive lung defect, which does not significantly improve post bronchodilators. Lung volumes revealed air trapping. Diffusing capacity was moderately impaired.        Del 6/26/2020  BI-RADS Category:   Overall: 2 - Benign      PET/CT 10/23/2020   Impression:     Stable mildly hypermetabolic right axillary lymph node/nodular density contralateral to the site of injection.  Differential considerations include metastatic lymph node, reactive lymph node from benign right upper extremity process,  and fat necrosis.  Recommend physical examination of the upper extremity and consideration of ultrasound for further evaluation of the node/nodule and possible image guided biopsy.  Otherwise, attention on follow-up.     Otherwise, no other hypermetabolic disease identified      Mammo diagnostic right /US 11/4/2020   Impression:   1. No suspicious finding either on mammogram or ultrasound at the site of the palpable lump in the right breast at 10:00 o'clock. A bilateral mammogram is recommended in June 2020 to return to the patient to annual screening.   2. Redemonstration of the morphologically abnormal lymph node in the right axilla that contains a biopsy clip, compatible with the known recurrence metastatic breast cancer that has been treated with chemotherapy. The appearance of this lymph node is unchanged from 06/26/2020 and overall appears smaller when compared to 03/11/2019.     Abd US 12/11/2020   Impression:     1. Echogenic liver likely representing hepatic steatosis.  2. Right upper quadrant ultrasound otherwise is unremarkable.     PET/CT 10/14/21     Impression:     1.  No definite evidence of hypermetabolic tumor.  Interval resolution of hypermetabolic right axillary lymph nodes.  No new hypermetabolic findings.     2.  Persistent low-grade diffuse cutaneous uptake which is nonspecific.    MRI shoulder w w/out contrast 1/26/22   Impression:     Mild edema and postcontrast enhancement at the myotendinous junction of the supraspinatus and infraspinatus, it is nonspecific and could be due to degenerative change or tendinosis.     Small area of interstitial tear at the footprint insertion of the infraspinatus tendon.     No large rotator cuff tear.     No advanced degenerative change.     No osseous lesions.     PET/CT 1/26/22  Impression:In this patient with breast cancer, there is no evidence of hypermetabolic tumor to suggest recurrent or metastatic disease.   Less extensive but, nonspecific, low-grade  diffuse cutaneous uptake of the right breast.       PET/CT 4/27/22  Impression:     1.  No FDG avid disease to suggest recurrence or metastatic disease.     Unchanged right breast skin thickening with mild FDG uptake.       Assessment:       1. Recurrent breast cancer, unspecified laterality    2. 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 Neoplastic malignant related fatigue    4. Anorexia    5. Hypokalemia    6. Chronic obstructive pulmonary disease, unspecified COPD type        Plan:     1.,2.   Pt with hx of Stage 1A invasive ductal carcinoma rt breast s/p rt segmental mastectomy and SLN bx 7/11/2014 ER/TN neg HER 2 jose maria neg [id number](i-).  S/p adjuvant RT completed 9/2014 Pt declined adjuvant chemo  Pt  hospitalized 11/2017 with acute-on-chronic  resp failure  Abnormal CT imaging revealing Large right hilar mass with involvement of  adjacent segmental pulmonary arterial branches and bronchi with associated postobstructive atelectasis  and involvement of mediastinal and axillary adenopathy   S/p rt axillary LN bx ( outside facility) - metastatic poorly differentiated carcinoma of unknown primary  site  status post  lung biopsy 1/31/2017 -benign lung tissue  PET/CT 1/26/2018   Intense FDG uptake within the known right infrahilar lung mass, compatible with malignancy.   Intensely hypermetabolic right axillary, retropectoral, and lower right cervical lymph nodes, compatible with metastases.  Abnormal FDG uptake along right breast skin thickening, new from prior chest CTA and mammogram.    Repeat lung bx 2/14/2018 revealed Squamous Cell CA lung PD-L1 10% EGFR NEGALK NEG  Pt treated for advanced squamous cell CA of lung   She s/p  cycle 6 of carboplatin/Abraxane Day1 completed 7/2/2018 ( day 15 held due to prolonged cytopenias)  She completed therapy with near CR     PET/CT 2/21/2019 showed increased size and irregularity of the right axillary lymph node with increased FDG avidity     MAMMO/US rt breast 3/2019  reveals  suspicious findings rt axilla LN.  Biopsy of the lymph node measuring 1.4 x 1.1 x 1.3     Pt s/p  rt axillary LN bxPositive for poorly differentiated carcinoma.- likely breast primary ERneg PRneg Her 2 neg    Outside review of specimen(s) revealed  lung tumor corresponds to a squamous cell carcinoma and appears to be unrelated to the invasive ductal carcinoma of the breast. It was determined The axillary mass, in my opinion, corresponded  to metastases of the breast primary. Although it is a poorly differentiated carcinoma, theimmunophenotype is most consistent with the one that the breast primary exhibited, and is inconsistent with a metastatic squamous cell carcinoma.     CT imaging 4/26/2019 persistent rt axillary LAD, no other sites of disease     Lymph node biopsy 3/11/2019 Right axilla, mass, core biopsy:Positive for poorly differentiated carcinoma.favor breast primary     PET/CT 6/20/2019  New and worsening hypermetabolic lymph nodes concerning for metastatic breast cancer     Previously Discussed  imaging findings in detail with patient which reveals new and worsening hypermetabolic melena metabolic lymph nodes including hypermetabolic supraclavicular node.  Therefore, at treatment option will be systemic chemotherapy in light of the recent imaging findings.  She will be followed by her surgical oncologist Dr. Christopher      IT was determined to proceed with systemic chemotherapy   S/p  AC [id number] x 3 wks x 4 wks completed 9/6/2019   ( pt has not received in past)      PET/CT 9/19/2019 shows disease response with interval decrease in size and uptake of several right axillary lymph nodes and a supraclavicular lymph node.  Mild residual activity may indicate viable tumor.No new hypermetabolic findings worrisome for malignancy.    Pt followed by  Breast Surgery and plan was  for possible   ALND. Pt with Recurrent cancer in her right axilla and right supraclavicular fossa.   She completed chemotherapy 9/6/2019 with  near CR  It was determined  Radiation will be given to the original areas of hypermetabolic activity in the right axilla and right supraclavicular region    Pt completed  RT 12/16/2019     PET/CT 1/14/21 shows   New right axillary hypermetabolic lymph nodes with preserved normal architecture suggestive of reactive nodes.  Stable appearing previously identified hypermetabolic lymph node with mild increase in surrounding fat stranding.        Findings previously d/w with treating breast surgeon and it was determined to cont to monitor and close f/u as pt is heavily pre treated, has received RT to site   Pt acknowledged understanding and agreeable with plan     PET/CT imaging  5/20/21 shows Continued increase in both size and hypermetabolic activity of right axillary level 1 lymph nodes a new, mildly hypermetabolic cutaneous thickening of the right breast.     Right breast, skin, punch biopsy:Negative for carcinoma    LYMPH NODE, RIGHT AXILLA, BIOPSY: 6/16/21   - Metastatic poorly-differentiated carcinoma, consistent with breast primary triple neg  PD-L1 immunohistochemistry studies(combined positive score, CPS) is greater than or equal to 10   PET/CT showed  No definite evidence of hypermetabolic tumor.  Interval resolution of hypermetabolic right axillary lymph nodes.  No new hypermetabolic findings.     S/p C15 pembrolizumab 5/24/22   Last  PET/CT imaging reviewed- shows continued good response and  there is no evidence of hypermetabolic tumor to suggest recurrent or metastatic disease.  Proceed with  next cycle   Plan dosage adjustment to pembro 400mg q6 wks  Plan PET/CT prior to f/u       3. F/b Pulmonology  Pt on supp 02 at night   Cont MDI and O2 as prescribed     3.. 4 Rx for short course Dex  Encourage Ensure supp  IVF today     5. Increase K supp to 40meq bid x 3 days, then continue 20 meq bid     6. Stable  F/u with Dr. Katz next week     Immunotherapy today ( switch to 400mg q6 wk dosing)  IVF 1liter NS  today  Pt to  Rx for DEX  PET/CT prior to f/u on 7/19/22  Plan MAG, CMP in 3 wks   Cbc,cmp, Mag, TSH, Free T4  prior to chemo on July 19     I spent a total of 60   minutes on the day of the visit.This includes face to face time and non-face to face time preparing to see the patient (eg, review of tests), obtaining and/or reviewing separately obtained history, documenting clinical information in the electronic or other health record, independently interpreting results and communicating results to the patient/family/caregiver, and coordinating care.     Advance Care Planning     Power of   I previously  initiated the process of advance care planning today and explained the importance of this process to the patient.  I introduced the concept of advance directives to the patient, as well. Then the patient received detailed information about the importance of designating a Health Care Power of  (HCPOA). She was also instructed to communicate with this person about their wishes for future healthcare, should she become sick and lose decision-making capacity. The patient has previously appointed a HCPOA. After our discussion, the patient has decided to complete a HCPOA and has appointed her son and niece and  I spent a total time of 16 minutes discussing this issue with the patient.         Cc: Swetha Katz M.D.         MD Ranjan Roberson MD

## 2022-06-15 NOTE — PLAN OF CARE
Patient arrived to unit for Keytruda 400 mg infusion. Pt reports fatigue, continued cough. Labs reviewed, pt cleared for Keytruda 400 mg Q6 week dose per Dr. Holliday. Plan of care reviewed, patient agreeable to plan. Patient tolerated infusion well.1L NS also administered during visit. VSS. Discharge instructions reviewed, patient instructed to return 6 weeks for next Keytruda, 7/27. Patient ambulated off unit unassisted. Patient in NAD at time of discharge.

## 2022-06-15 NOTE — TELEPHONE ENCOUNTER
Left message for pt to call to office back. Per pet scan department, they should have the new pet schedule open before the end of the week and we can try to reschedule her for shaila.

## 2022-06-15 NOTE — Clinical Note
Immunotherapy today ( switch to 400mg q6 wk dosing) IVF 1liter NS today Pt to  Rx for DEX PET/CT prior to f/u on 7./19/22 Schedule MAG, CMP in 3 wks  Cbc,cmp, Mag, TSH, Free T4  prior to chemo on July 19

## 2022-06-20 DIAGNOSIS — Z12.11 SCREEN FOR COLON CANCER: Primary | ICD-10-CM

## 2022-06-20 RX ORDER — POLYETHYLENE GLYCOL 3350, SODIUM SULFATE ANHYDROUS, SODIUM BICARBONATE, SODIUM CHLORIDE, POTASSIUM CHLORIDE 236; 22.74; 6.74; 5.86; 2.97 G/4L; G/4L; G/4L; G/4L; G/4L
4 POWDER, FOR SOLUTION ORAL ONCE
Qty: 4000 ML | Refills: 0 | Status: SHIPPED | OUTPATIENT
Start: 2022-06-20 | End: 2022-06-20

## 2022-07-06 ENCOUNTER — INFUSION (OUTPATIENT)
Dept: INFUSION THERAPY | Facility: HOSPITAL | Age: 77
End: 2022-07-06
Attending: INTERNAL MEDICINE
Payer: MEDICARE

## 2022-07-06 DIAGNOSIS — C50.111 MALIGNANT NEOPLASM OF CENTRAL PORTION OF RIGHT BREAST IN FEMALE, ESTROGEN RECEPTOR NEGATIVE: ICD-10-CM

## 2022-07-06 DIAGNOSIS — C50.919 METASTATIC BREAST CANCER: ICD-10-CM

## 2022-07-06 DIAGNOSIS — C34.90 SQUAMOUS CELL CARCINOMA LUNG: Primary | ICD-10-CM

## 2022-07-06 DIAGNOSIS — C34.91 SQUAMOUS CELL CARCINOMA OF RIGHT LUNG: ICD-10-CM

## 2022-07-06 DIAGNOSIS — Z17.1 MALIGNANT NEOPLASM OF CENTRAL PORTION OF RIGHT BREAST IN FEMALE, ESTROGEN RECEPTOR NEGATIVE: ICD-10-CM

## 2022-07-06 DIAGNOSIS — E03.2 HYPOTHYROIDISM DUE TO DRUGS: ICD-10-CM

## 2022-07-06 LAB
ALBUMIN SERPL BCP-MCNC: 3.4 G/DL (ref 3.5–5.2)
ALP SERPL-CCNC: 107 U/L (ref 55–135)
ALT SERPL W/O P-5'-P-CCNC: 52 U/L (ref 10–44)
ANION GAP SERPL CALC-SCNC: 10 MMOL/L (ref 8–16)
AST SERPL-CCNC: 38 U/L (ref 10–40)
BILIRUB SERPL-MCNC: 0.9 MG/DL (ref 0.1–1)
BUN SERPL-MCNC: 13 MG/DL (ref 8–23)
CALCIUM SERPL-MCNC: 9.7 MG/DL (ref 8.7–10.5)
CHLORIDE SERPL-SCNC: 100 MMOL/L (ref 95–110)
CO2 SERPL-SCNC: 26 MMOL/L (ref 23–29)
CREAT SERPL-MCNC: 0.9 MG/DL (ref 0.5–1.4)
ERYTHROCYTE [DISTWIDTH] IN BLOOD BY AUTOMATED COUNT: 14.2 % (ref 11.5–14.5)
EST. GFR  (AFRICAN AMERICAN): >60 ML/MIN/1.73 M^2
EST. GFR  (NON AFRICAN AMERICAN): >60 ML/MIN/1.73 M^2
GLUCOSE SERPL-MCNC: 114 MG/DL (ref 70–110)
HCT VFR BLD AUTO: 37.2 % (ref 37–48.5)
HGB BLD-MCNC: 12.6 G/DL (ref 12–16)
IMM GRANULOCYTES # BLD AUTO: 0.03 K/UL (ref 0–0.04)
MAGNESIUM SERPL-MCNC: 1.4 MG/DL (ref 1.6–2.6)
MCH RBC QN AUTO: 30.3 PG (ref 27–31)
MCHC RBC AUTO-ENTMCNC: 33.9 G/DL (ref 32–36)
MCV RBC AUTO: 89 FL (ref 82–98)
NEUTROPHILS # BLD AUTO: 4.6 K/UL (ref 1.8–7.7)
PLATELET # BLD AUTO: 213 K/UL (ref 150–450)
PMV BLD AUTO: 10.4 FL (ref 9.2–12.9)
POTASSIUM SERPL-SCNC: 3.6 MMOL/L (ref 3.5–5.1)
PROT SERPL-MCNC: 7.3 G/DL (ref 6–8.4)
RBC # BLD AUTO: 4.16 M/UL (ref 4–5.4)
SODIUM SERPL-SCNC: 136 MMOL/L (ref 136–145)
TSH SERPL DL<=0.005 MIU/L-ACNC: 2.4 UIU/ML (ref 0.4–4)
WBC # BLD AUTO: 6.67 K/UL (ref 3.9–12.7)

## 2022-07-06 PROCEDURE — 96365 THER/PROPH/DIAG IV INF INIT: CPT

## 2022-07-06 PROCEDURE — 96523 IRRIG DRUG DELIVERY DEVICE: CPT

## 2022-07-06 PROCEDURE — 96366 THER/PROPH/DIAG IV INF ADDON: CPT

## 2022-07-06 PROCEDURE — 25000003 PHARM REV CODE 250: Performed by: INTERNAL MEDICINE

## 2022-07-06 PROCEDURE — 63600175 PHARM REV CODE 636 W HCPCS: Performed by: INTERNAL MEDICINE

## 2022-07-06 PROCEDURE — 85027 COMPLETE CBC AUTOMATED: CPT | Performed by: INTERNAL MEDICINE

## 2022-07-06 PROCEDURE — 80053 COMPREHEN METABOLIC PANEL: CPT | Performed by: INTERNAL MEDICINE

## 2022-07-06 PROCEDURE — 84443 ASSAY THYROID STIM HORMONE: CPT | Performed by: INTERNAL MEDICINE

## 2022-07-06 PROCEDURE — 83735 ASSAY OF MAGNESIUM: CPT | Performed by: INTERNAL MEDICINE

## 2022-07-06 PROCEDURE — 36591 DRAW BLOOD OFF VENOUS DEVICE: CPT

## 2022-07-06 PROCEDURE — A4216 STERILE WATER/SALINE, 10 ML: HCPCS | Performed by: INTERNAL MEDICINE

## 2022-07-06 RX ORDER — SODIUM CHLORIDE 0.9 % (FLUSH) 0.9 %
10 SYRINGE (ML) INJECTION
Status: CANCELLED | OUTPATIENT
Start: 2022-07-06

## 2022-07-06 RX ORDER — HEPARIN 100 UNIT/ML
500 SYRINGE INTRAVENOUS
Status: DISCONTINUED | OUTPATIENT
Start: 2022-07-06 | End: 2022-07-06 | Stop reason: HOSPADM

## 2022-07-06 RX ORDER — SODIUM CHLORIDE 0.9 % (FLUSH) 0.9 %
10 SYRINGE (ML) INJECTION
Status: DISCONTINUED | OUTPATIENT
Start: 2022-07-06 | End: 2022-07-06 | Stop reason: HOSPADM

## 2022-07-06 RX ORDER — HEPARIN 100 UNIT/ML
500 SYRINGE INTRAVENOUS
Status: CANCELLED | OUTPATIENT
Start: 2022-07-06

## 2022-07-06 RX ADMIN — HEPARIN 500 UNITS: 100 SYRINGE at 12:07

## 2022-07-06 RX ADMIN — MAGNESIUM SULFATE HEPTAHYDRATE 2 G: 500 INJECTION, SOLUTION INTRAMUSCULAR; INTRAVENOUS at 09:07

## 2022-07-06 RX ADMIN — Medication 10 ML: at 12:07

## 2022-07-11 DIAGNOSIS — R42 VERTIGO: ICD-10-CM

## 2022-07-11 RX ORDER — NITROGLYCERIN 0.4 MG/1
TABLET SUBLINGUAL
Qty: 90 TABLET | Refills: 0 | Status: SHIPPED | OUTPATIENT
Start: 2022-07-11 | End: 2024-01-02

## 2022-07-11 NOTE — TELEPHONE ENCOUNTER
No new care gaps identified.  [facility] Embedded Care Gaps. Reference number: [id number]. 7/11/2022   7:28:48 AM YULIANAT

## 2022-07-11 NOTE — TELEPHONE ENCOUNTER
Last Office Visit Info:   The patient's last visit with Jeannine Huitron MD was on 12/7/2020.    The patient's last visit in current department was on 4/11/2022.        Last CBC Results:   Lab Results   Component Value Date    WBC 6.67 07/06/2022    HGB 12.6 07/06/2022    HCT 37.2 07/06/2022     07/06/2022       Last CMP Results  Lab Results   Component Value Date     07/06/2022    K 3.6 07/06/2022     07/06/2022    CO2 26 07/06/2022    BUN 13 07/06/2022    CREATININE 0.9 07/06/2022    CALCIUM 9.7 07/06/2022    ALBUMIN 3.4 (L) 07/06/2022    AST 38 07/06/2022    ALT 52 (H) 07/06/2022       Last Lipids  Lab Results   Component Value Date    CHOL 139 09/18/2020    TRIG 191 (H) 09/18/2020    HDL 51 09/18/2020    LDLCALC 49.8 (L) 09/18/2020       Last A1C  Lab Results   Component Value Date    HGBA1C 5.9 (H) 04/07/2022       Last TSH  Lab Results   Component Value Date    TSH 2.395 07/06/2022             Current Med Refills  Medication List with Changes/Refills   Current Medications    ACETAMINOPHEN (TYLENOL) 650 MG TBSR    Take 650 mg by mouth every 8 (eight) hours.       Start Date: --        End Date: --    ALBUTEROL (PROVENTIL) 2.5 MG /3 ML (0.083 %) NEBULIZER SOLUTION    NEBULIZE 1 vial EVERY 6 HOURS AS NEEDED FOR WHEEZING       Start Date: 2/8/2017  End Date: --    ALBUTEROL (VENTOLIN HFA) 90 MCG/ACTUATION INHALER    INHALE 2 PUFF(S) BY MOUTH EVERY 4 - 6 HOURS AS NEEDED FOR SHORTNESS OF BREATH or WHEEZING       Start Date: 8/26/2014 End Date: --    ASPIRIN (ECOTRIN) 81 MG EC TABLET    Take 81 mg by mouth once daily.        Start Date: --        End Date: --    CARBOXYMETHYLCELLULOSE (REFRESH PLUS) 0.5 % DPET    1 drop 3 (three) times daily as needed.       Start Date: --        End Date: --    FLUTICASONE PROPIONATE (FLONASE) 50 MCG/ACTUATION NASAL SPRAY    USE TWO SPRAYS IN EACH NOSTRIL ONCE A DAY       Start Date: 3/22/2022 End Date: --    HEPARIN, PORCINE, PF, (HEPARIN FLUSH 100 UNITS/ML) 100  UNIT/ML SYRG           Start Date: 12/1/2021 End Date: --    ISOSORBIDE MONONITRATE (IMDUR) 30 MG 24 HR TABLET    Take 1 tablet (30 mg total) by mouth once daily. Hold if SBP <120       Start Date: 4/7/2022  End Date: --    KEYTRUDA 25 MG/ML SOLN    Inject into the vein every 21 days.       Start Date: 12/1/2021 End Date: --    MECLIZINE (ANTIVERT) 25 MG TABLET    CHEW and SWALLOW 1 TABLET(S) BY MOUTH EVERY 6 HOURS AS NEEDED FOR DIZZINESS       Start Date: 11/10/2021End Date: --    MONTELUKAST (SINGULAIR) 10 MG TABLET    TAKE 1 TABLET BY MOUTH EVERY EVENING       Start Date: 7/5/2022  End Date: --    NITROGLYCERIN (NITROSTAT) 0.4 MG SL TABLET    place 1 tablet under the tongue AS NEEDED. no more than 3 in 15 minutes       Start Date: 3/9/2020  End Date: --    OMEPRAZOLE (PRILOSEC) 40 MG CAPSULE    Take 40 mg by mouth once daily.       Start Date: 1/4/2021  End Date: --    POTASSIUM CHLORIDE (MICRO-K) 10 MEQ CPSR    Take 1 capsule (10 mEq total) by mouth 2 (two) times daily.       Start Date: 4/11/2022 End Date: --    ROSUVASTATIN (CRESTOR) 10 MG TABLET    TAKE 1 TABLET BY MOUTH EVERY DAY       Start Date: 2/2/2022  End Date: --    TRELEGY ELLIPTA 100-62.5-25 MCG DSDV    INHALE ONE PUFF INTO THE LUNGS ONCE A DAY       Start Date: 10/25/2018End Date: --       Order(s) placed per written order guidelines:     Please advise.

## 2022-07-13 ENCOUNTER — HOSPITAL ENCOUNTER (OUTPATIENT)
Dept: RADIOLOGY | Facility: HOSPITAL | Age: 77
Discharge: HOME | End: 2022-07-13
Attending: INTERNAL MEDICINE
Payer: MEDICARE

## 2022-07-13 DIAGNOSIS — C50.919 RECURRENT BREAST CANCER, UNSPECIFIED LATERALITY: ICD-10-CM

## 2022-07-13 PROCEDURE — 78815 NM PET CT ROUTINE: ICD-10-PCS | Mod: 26,PS,, | Performed by: RADIOLOGY

## 2022-07-13 PROCEDURE — 78815 PET IMAGE W/CT SKULL-THIGH: CPT | Mod: 26,PS,, | Performed by: RADIOLOGY

## 2022-07-13 PROCEDURE — 78815 PET IMAGE W/CT SKULL-THIGH: CPT | Mod: TC,PS

## 2022-07-18 RX ORDER — MECLIZINE HYDROCHLORIDE 25 MG/1
25 TABLET ORAL 2 TIMES DAILY PRN
Qty: 30 TABLET | Refills: 2 | Status: SHIPPED | OUTPATIENT
Start: 2022-07-18 | End: 2023-01-06

## 2022-07-19 ENCOUNTER — INFUSION (OUTPATIENT)
Dept: INFUSION THERAPY | Facility: HOSPITAL | Age: 77
End: 2022-07-19
Attending: INTERNAL MEDICINE
Payer: MEDICARE

## 2022-07-19 ENCOUNTER — OFFICE VISIT (OUTPATIENT)
Dept: HEMATOLOGY/ONCOLOGY | Facility: CLINIC | Age: 77
End: 2022-07-19
Payer: MEDICARE

## 2022-07-19 VITALS
BODY MASS INDEX: 28 KG/M2 | HEART RATE: 53 BPM | SYSTOLIC BLOOD PRESSURE: 142 MMHG | WEIGHT: 158 LBS | OXYGEN SATURATION: 99 % | DIASTOLIC BLOOD PRESSURE: 72 MMHG | HEIGHT: 63 IN

## 2022-07-19 VITALS
SYSTOLIC BLOOD PRESSURE: 131 MMHG | OXYGEN SATURATION: 97 % | TEMPERATURE: 98 F | DIASTOLIC BLOOD PRESSURE: 73 MMHG | RESPIRATION RATE: 16 BRPM | HEART RATE: 70 BPM

## 2022-07-19 DIAGNOSIS — R53.0 NEOPLASTIC MALIGNANT RELATED FATIGUE: ICD-10-CM

## 2022-07-19 DIAGNOSIS — Z79.899 IMMUNODEFICIENCY DUE TO CHEMOTHERAPY: ICD-10-CM

## 2022-07-19 DIAGNOSIS — C34.90 SQUAMOUS CELL CARCINOMA LUNG: Primary | ICD-10-CM

## 2022-07-19 DIAGNOSIS — E83.42 HYPOMAGNESEMIA: Primary | ICD-10-CM

## 2022-07-19 DIAGNOSIS — T45.1X5A IMMUNODEFICIENCY DUE TO CHEMOTHERAPY: ICD-10-CM

## 2022-07-19 DIAGNOSIS — C50.919 RECURRENT BREAST CANCER, UNSPECIFIED LATERALITY: ICD-10-CM

## 2022-07-19 DIAGNOSIS — D84.821 IMMUNODEFICIENCY DUE TO CHEMOTHERAPY: ICD-10-CM

## 2022-07-19 DIAGNOSIS — E83.42 HYPOMAGNESEMIA: ICD-10-CM

## 2022-07-19 DIAGNOSIS — J44.9 CHRONIC OBSTRUCTIVE PULMONARY DISEASE, UNSPECIFIED COPD TYPE: ICD-10-CM

## 2022-07-19 DIAGNOSIS — C50.919 RECURRENT BREAST CANCER, UNSPECIFIED LATERALITY: Primary | ICD-10-CM

## 2022-07-19 DIAGNOSIS — Z12.31 ENCOUNTER FOR SCREENING MAMMOGRAM FOR MALIGNANT NEOPLASM OF BREAST: ICD-10-CM

## 2022-07-19 DIAGNOSIS — J22 ACUTE RESPIRATORY INFECTION: ICD-10-CM

## 2022-07-19 PROCEDURE — 96366 THER/PROPH/DIAG IV INF ADDON: CPT

## 2022-07-19 PROCEDURE — A4216 STERILE WATER/SALINE, 10 ML: HCPCS | Performed by: INTERNAL MEDICINE

## 2022-07-19 PROCEDURE — 3044F PR MOST RECENT HEMOGLOBIN A1C LEVEL <7.0%: ICD-10-PCS | Mod: CPTII,S$GLB,, | Performed by: INTERNAL MEDICINE

## 2022-07-19 PROCEDURE — 3288F FALL RISK ASSESSMENT DOCD: CPT | Mod: CPTII,S$GLB,, | Performed by: INTERNAL MEDICINE

## 2022-07-19 PROCEDURE — 3288F PR FALLS RISK ASSESSMENT DOCUMENTED: ICD-10-PCS | Mod: CPTII,S$GLB,, | Performed by: INTERNAL MEDICINE

## 2022-07-19 PROCEDURE — 99499 UNLISTED E&M SERVICE: CPT | Mod: S$GLB,,, | Performed by: INTERNAL MEDICINE

## 2022-07-19 PROCEDURE — 1126F PR PAIN SEVERITY QUANTIFIED, NO PAIN PRESENT: ICD-10-PCS | Mod: CPTII,S$GLB,, | Performed by: INTERNAL MEDICINE

## 2022-07-19 PROCEDURE — 99999 PR PBB SHADOW E&M-EST. PATIENT-LVL IV: CPT | Mod: PBBFAC,,, | Performed by: INTERNAL MEDICINE

## 2022-07-19 PROCEDURE — 63600175 PHARM REV CODE 636 W HCPCS: Performed by: INTERNAL MEDICINE

## 2022-07-19 PROCEDURE — 99499 RISK ADDL DX/OHS AUDIT: ICD-10-PCS | Mod: S$GLB,,, | Performed by: INTERNAL MEDICINE

## 2022-07-19 PROCEDURE — 1157F PR ADVANCE CARE PLAN OR EQUIV PRESENT IN MEDICAL RECORD: ICD-10-PCS | Mod: CPTII,S$GLB,, | Performed by: INTERNAL MEDICINE

## 2022-07-19 PROCEDURE — 96365 THER/PROPH/DIAG IV INF INIT: CPT

## 2022-07-19 PROCEDURE — 99215 PR OFFICE/OUTPT VISIT, EST, LEVL V, 40-54 MIN: ICD-10-PCS | Mod: S$GLB,,, | Performed by: INTERNAL MEDICINE

## 2022-07-19 PROCEDURE — 1101F PT FALLS ASSESS-DOCD LE1/YR: CPT | Mod: CPTII,S$GLB,, | Performed by: INTERNAL MEDICINE

## 2022-07-19 PROCEDURE — 25000003 PHARM REV CODE 250: Performed by: INTERNAL MEDICINE

## 2022-07-19 PROCEDURE — 1126F AMNT PAIN NOTED NONE PRSNT: CPT | Mod: CPTII,S$GLB,, | Performed by: INTERNAL MEDICINE

## 2022-07-19 PROCEDURE — 3077F SYST BP >= 140 MM HG: CPT | Mod: CPTII,S$GLB,, | Performed by: INTERNAL MEDICINE

## 2022-07-19 PROCEDURE — 3078F PR MOST RECENT DIASTOLIC BLOOD PRESSURE < 80 MM HG: ICD-10-PCS | Mod: CPTII,S$GLB,, | Performed by: INTERNAL MEDICINE

## 2022-07-19 PROCEDURE — 3077F PR MOST RECENT SYSTOLIC BLOOD PRESSURE >= 140 MM HG: ICD-10-PCS | Mod: CPTII,S$GLB,, | Performed by: INTERNAL MEDICINE

## 2022-07-19 PROCEDURE — 99999 PR PBB SHADOW E&M-EST. PATIENT-LVL IV: ICD-10-PCS | Mod: PBBFAC,,, | Performed by: INTERNAL MEDICINE

## 2022-07-19 PROCEDURE — 1101F PR PT FALLS ASSESS DOC 0-1 FALLS W/OUT INJ PAST YR: ICD-10-PCS | Mod: CPTII,S$GLB,, | Performed by: INTERNAL MEDICINE

## 2022-07-19 PROCEDURE — 99215 OFFICE O/P EST HI 40 MIN: CPT | Mod: S$GLB,,, | Performed by: INTERNAL MEDICINE

## 2022-07-19 PROCEDURE — 3078F DIAST BP <80 MM HG: CPT | Mod: CPTII,S$GLB,, | Performed by: INTERNAL MEDICINE

## 2022-07-19 PROCEDURE — 1157F ADVNC CARE PLAN IN RCRD: CPT | Mod: CPTII,S$GLB,, | Performed by: INTERNAL MEDICINE

## 2022-07-19 PROCEDURE — 3044F HG A1C LEVEL LT 7.0%: CPT | Mod: CPTII,S$GLB,, | Performed by: INTERNAL MEDICINE

## 2022-07-19 RX ORDER — BENZONATATE 100 MG/1
100 CAPSULE ORAL EVERY 6 HOURS PRN
Qty: 30 CAPSULE | Refills: 1 | Status: SHIPPED | OUTPATIENT
Start: 2022-07-19 | End: 2022-08-31 | Stop reason: SDUPTHER

## 2022-07-19 RX ORDER — HEPARIN 100 UNIT/ML
500 SYRINGE INTRAVENOUS
Status: DISCONTINUED | OUTPATIENT
Start: 2022-07-19 | End: 2022-07-19 | Stop reason: HOSPADM

## 2022-07-19 RX ORDER — HEPARIN 100 UNIT/ML
500 SYRINGE INTRAVENOUS
Status: CANCELLED | OUTPATIENT
Start: 2022-07-19

## 2022-07-19 RX ORDER — SODIUM CHLORIDE 0.9 % (FLUSH) 0.9 %
10 SYRINGE (ML) INJECTION
Status: DISCONTINUED | OUTPATIENT
Start: 2022-07-19 | End: 2022-07-19 | Stop reason: HOSPADM

## 2022-07-19 RX ORDER — AMOXICILLIN AND CLAVULANATE POTASSIUM 875; 125 MG/1; MG/1
1 TABLET, FILM COATED ORAL 2 TIMES DAILY
Qty: 20 TABLET | Refills: 0 | Status: SHIPPED | OUTPATIENT
Start: 2022-07-19 | End: 2022-07-29

## 2022-07-19 RX ORDER — SODIUM CHLORIDE 0.9 % (FLUSH) 0.9 %
10 SYRINGE (ML) INJECTION
Status: CANCELLED | OUTPATIENT
Start: 2022-07-19

## 2022-07-19 RX ADMIN — HEPARIN 500 UNITS: 100 SYRINGE at 10:07

## 2022-07-19 RX ADMIN — Medication 10 ML: at 10:07

## 2022-07-19 RX ADMIN — MAGNESIUM SULFATE HEPTAHYDRATE 2 G: 500 INJECTION, SOLUTION INTRAMUSCULAR; INTRAVENOUS at 09:07

## 2022-07-19 NOTE — PROGRESS NOTES
Subjective:       Patient ID: Ade Castellano is a 76 y.o. female.    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 The PET(+) disease in the right axilla and right supraclavicular fossa will be boosted at 200 cGy per fraction to 6000 cGy total dose.  S/p LYMPH NODE, RIGHT AXILLA, BIOPSY: 6/16/21 . Metastatic poorly-differentiated carcinoma, c/w breast primary ERnegPRneg Her2 neg  PD-L1 (22C3) IHC CPS> is greater than or equal to 10  Pembrolizumab 7/22/21 -present        Prior Hx:  75 y/o female with history of  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  And Rt  breast Haja/p  cycle 6 of carboplatin/Abraxane 7/2/2018   She has  History of Stage 1A  Rt breast cancer Grade 3, ER/NM neg , Her 2 jose maria neg s/p lumpectomy 7/11/2014 and s/p adjuvant RT 9/2014 Pt declined Adjuvant chemo.Pathology showed a 1.15 cm, grade 3 infiltrating ductal carcinoma.  One sentinel lymph node was negative for malignancy.  Margins were negative and the closest margin was 1 cm.  She was staged  pT1c  pN0 cM0 stage IA.  She declined adjuvant chemo therapy.  She completed post operative radiation therapy at 400 cGy per fraction to 4000 cGy 9/2014  Pt hospitalized 11/2017 for  acute on chronic respiratory failure requiring intubation and ventilation. Has large lung mass in right lung with probable post obstructive pneumonia resulting in COPD  exacerbation.CTA chest 11/29/2017 revealed   Large right hilar mass with involvement of adjacent segmental pulmonary arterial branches and bronchi with associated postobstructive atelectasis and volume loss in the RML  with adjacent ground glass opacity concerning for underlying neoplastic process. Mediastinal and axillary adenopathy, with a right axillary lymph node measuring up to 2.0 cm in short axis diameter.She underwent rt axillary LN bx at outside facility- on 1/16/2018 at [facility]. Pathology revealed metastatic poorly differentiated carcinoma of unknown primary site. She next underwent lung bx at [facility] 1/31/2018  benign lung tissue. Repeat Right Lung Bx 2/14/2018 Pathology reveals Squamous cell carcinoma PD-L1 10% low expression EGFR NEG ALK NEG BRAF NEG. Outside slides axillary LN specimen were reviewed/comparison to lung bx findings . She completed    cycle 6 of carboplatin/Abraxane completed 7/2018 .PET/CT  4/19/2018 revealed there has been a excellent response to therapy.  At least 90% reduction in malignant activity.PET/CT 2/21/2019 increased size and irregularity of the right axillary lymph node with increased FDG avidityMAMMO/US rt breast 2/2019 revealed suspicious findings rt axilla LN.  Right axilla, mass, core biopsy: Positive for poorly differentiated carcinoma breast primary ER neg/AZ neg Her2 neg.[facility] reviewIt was determined  the lung tumor corresponds to a squamous cellcarcinoma and appears to be unrelated to the invasive ductal carcinoma of the breast. The axillary mass, in myopinion, corresponds to metastases of the breast primary. Although it is a poorly differentiated carcinoma, theimmunophenotype is most consistent with the one that the breast primary exhibited, and is inconsistent with metastatic squamous cell carcinoma. She was treated with  AC ( adriamycin 60m/m2/Cytoxan 600mg/m2)  q21d x 4 cycles completed 9/6/2019 . PET/CT 9/19/2019 shows disease response  l decrease in size and uptake of several right axillary lymph nodes and a supraclavicular lymph node.  Mild residual activity may indicate viable tumor.Pt followed by Rad/Onc. She was presented at [facility] [place] tumor board and it was determined to proceed Radiation therapy only. No ALND due to concurrent lung and supraclavicular node. She  completed  RT 12/16/2019  To right axilla and right supraclavicular region PET/CT imaging  5/20/21 shows Continued increase in both size and hypermetabolic activity of right axillary level 1 lymph nodes a new, mildly hypermetabolic cutaneous thickening of the right breast. she underwent LYMPH NODE, RIGHT AXILLA, BIOPSY: 6/16/21 . Pathology showed Metastatic poorly-differentiated carcinoma, consistent with breast primary-ERneg/ PRneg  HER2  neg  Pt started on pembrolizumab 7/2021  Pt hospitalized 4/6/22 with hypokalemia and orthostatic hypotension        Interval Hx:She reports worsening cough  Chronic LOGAN-stabe  No fevers  Mod  fatigue    Diminished appetite and some wt loss  S/p C16 pembrolizumab 6/15/22   She has supp 02 at [facility]  Pox 99% RA 02 sats today    PET /CT 7/13/22 shows  new left lower lobe opacification with mild radiotracer uptake which may represent aspiration, pneumonia, or malignancy.  Tissue sampling would be required for definitive diagnosis.    She is followed by Dr. Katz, pulmonology    She also has been followed by GI for dysphagia  In past  She has undergone dilation      Last Mammo 6/16/21  No mammographic evidence of malignancy.BI-RADS Category 1: Negative      PET/CT 1/26/22 No definite evidence of hypermetabolic tumor.  Interval resolution of hypermetabolic right axillary lymph nodes.  No new hypermetabolic findings.        PrevHx:She had an abnormal mammogram 6/4/2014 whichRevealed a round solid mass 6mm in rt breast.She then underwent U/S guided core bx of rt breast mass on 6/17/2014.Pathology revealed infiltrating ductal carcinoma Grade 3 with  "tumorPresent in thin-walled spaces suggestive of lymphatic spaces. HormoneReceptor status on tumor specimen revealed ER negative 0% ,TN negative 0% and Her 2 jose maria negative.She subsequently underwent rt segmental mastectomy and SLN bxOn 7/11/2014. Pathology of rt breast lumpectomy revealed invasiveDuctal carcinoma with micropapillary pattern ( Invasive micropapillaryCa) with max tumor dimension 11.5mm with suggestion of tumor in Thin walled spaces c/w lymphovascular involvement. SurgicalMargins free of tumor, grade 3, ER/TN neg , Her 2 jose maria neg With [place] Lymph node negative for Neoplasm. Pathologic staging [id number](i-)Her mother was diagnosed with breast cancer in her 50's/        Review of Systems   Constitutional: Positive for appetite change, fatigue and unexpected weight change. Negative for activity change and fever.   HENT: Negative for mouth sores, rhinorrhea and trouble swallowing.    Eyes: Negative for visual disturbance.   Respiratory: Positive for cough (worsening) and shortness of breath (chronic, stable). Negative for wheezing.    Cardiovascular: Negative for chest pain.   Gastrointestinal: Negative for abdominal pain and diarrhea.   Genitourinary: Negative for frequency.   Musculoskeletal: Negative for back pain.   Skin: Negative for rash.   Neurological: Negative for dizziness and headaches.   Hematological: Negative for adenopathy.   Psychiatric/Behavioral: The patient is not nervous/anxious.        Objective:          Vitals:    07/19/22 0812   BP: (!) 142/72   BP Location: Left arm   Patient Position: Sitting   BP Method: Large (Automatic)   Pulse: (!) 53   SpO2: 99%   Weight: 71.7 kg (158 lb)   Height: 5' 3" (1.6 m)         Physical Exam   Constitutional: She is oriented to person, place, and time. She appears ill, coughing episodes  HENT:   Head: Normocephalic.   Eyes: Conjunctivae and lids are normal.No scleral icterus.   Neck: Normal range of motion. Neck supple. No thyromegaly present. "   Cardiovascular: Normal rate, regular rhythm and normal heart sounds.    No murmur heard.  Pulmonary/Chest: Breath sounds normal. She has no wheezes. She has no rales.   Abdominal: Soft. Bowel sounds are normal.  There is no tenderness. There is no rebound and no guarding.   Left breast- no masses or axillary LAD noted  Right breast- breast thickening inner quad    She has no cervical adenopathy.     She has rt axillary adenopathy.        Right: No supraclavicular adenopathy present.        Left: No supraclavicular adenopathy present.   Neurological: She is alert and oriented to person, place, and time. No cranial nerve deficit. Coordination normal.   Skin: Skin is warm and dry. No ecchymosis, no petechiae and no rash noted. No erythema.   Psychiatric: She has a normal mood and affect.             CT a/p w/contrast 11/29/2018   1. No acute abnormality identified within the abdomen and pelvis.    2.  There are a few nonspecific prominent lymph nodes in the upper abdomen including a periesophageal lymph node which measures 1.0 cm in short axis diameter.    3.  Significant abdominal aortic atherosclerosis and abdominal aorta ectasia.    4.  Additional findings as above.    PET/CT 1/26/2018   1.  Intense FDG uptake within the known right infrahilar lung mass, compatible with malignancy.  It is unclear if this represents primary lung malignancy or metastatic breast cancer.    2.  Intensely hypermetabolic right axillary, retropectoral, and lower right cervical lymph nodes, compatible with metastases.    3.  Abnormal FDG uptake along right breast skin thickening, new from prior chest CTA and mammogram.  This may relate to localized edema/inflammation, though correlation with physical exam and mammography are recommended to exclude underlying inflammatory carcinoma.      MRI Brain w w/out contrast 2/9/2018  1.  No evidence of intracranial metastases.  2.  Sinus disease       Rt axillary LN bx at outside facility- on  1/16/2018 at [facility]  Pathology revealed metastatic poorly differentiated carcinoma of unknown primary  site 1/16/2018 at [facility]  TTF-1 negative, napsin negative  , cytokeratin 7 positive, cytokeratin 20 negative, p63 negative, cytokeratin 5/6 focal dim positivity    LUNG BIOPSY 1/31/2018  Pathology revealed benign lung tissue         SPECIMEN  1) Right Lung Bx 2/14/2018  Supplemental Diagnosis  Immunohistochemical stains show strong nuclear staining for p63 in essentially all tumor cells and very strong  cytoplasmic and membrane staining for CK5/6, also in essentially all tumor cells. TTF-1 and CK7 are negative  within tumor cells but do stain native pulmonary elements present within the biopsy. A stain for mucicarmine is  negative. Positive and negative controls function appropriately.  Final diagnosis: Specimen submitted as right lung biopsy  -Squamous cell carcinoma  (Electronically Signed: 2018-02-20 09:12:07 )  Diagnosed by: Phong Thompson  FINAL PATHOLOGIC DIAGNOSIS  Fragments of pulmonary parenchyma (submitted as right lung biopsy):    FINAL PATHOLOGIC DIAGNOSIS 1. Lymph node, right axilla, biopsy, review of 10 outside slides [facility], JS 18 [id number],  collected January 11, 2018: Metastatic poorly differentiated carcinoma (see comment).  The histologic section shows fibrous stroma infiltrated by nest of poorly differentiated malignant cells including  occasional dyskeratotic cells morphologically suggestive of metastatic squamous cell carcinoma.  Immunohistochemical stains are nonspecific; the cells are positive for cytokeratin 7 and cytokeratin 5/6 (focal) and  negative for cytokeratin 20, TTF-1, p63, GCDFP, mammoglobin, and CEA        PET/CT 2/21/2019  Increased size and irregularity of the right axillary lymph node with increased FDG avidity.  Although this would be atypical in location for lung carcinoma, the increased size and avidity must be  concerning for metastatic disease in this patient with history of squamous cell lung cancer.     US Rt breast and mammo 2/26/2019  Impression:  Right  Lymph Node: Right axilla lymph node. Assessment: 4 - Suspicious finding. Biopsy is recommended.      BI-RADS Category:   Right: 4 - Suspicious  Overall: 4 - Suspicious     Recommendation:  Biopsy is recommended. Biopsy of the lymph node measuring 1.4 x 1.1 x 1.3 recommended , the one with the round shape configuration, corresponding to the most recent PET CT finding.         Pathology 3/11/2019   FINAL PATHOLOGIC DIAGNOSIS  Right axilla, mass, core biopsy:  Positive for poorly differentiated carcinoma.  See comment.  Comment:  The biopsy from the right axilla mass shows a poorly differentiated carcinoma in background lymphoid tissue  with enlarged cells showing increased nuclear size, prominent nucleoli, moderate amounts of cytoplasm and mitotic  figures. Immunostains are completed and reveal the tumor cells to stain positively with cytokeratin AE 1/AE3, CK  5/6 and CK 7. The tumor cells are negative for CK 20, Estrogen receptor, p63 and TTF-1. All stains have  satisfactory positive and negative controls. The patient's prior cases will be reviewed and an additional stain for  JUAREZ-3 is pending in an attempt to pinpoint the primary site of this malignancy and results will follow in a  supplemental report.      Supplemental Diagnosis  3/11/2019  The current axillary mass is compared to the patient's prior right lung biopsy (case number [id number]) and the  current axillary mass is morphologically similar to the lung malignancy. Also, the current axillary mass is compared  to the patient's prior breast resection (Case number [id number]) and appears similar as well. P63 is negative in the  [id number] tumor and CK 5/6 shows scattered positive staining in the [id number] tumor.  Immunostain for JUAREZ-3 is completed and shows only rare weak to moderate staining within the tumor  cell nuclei  with satisfactory positive and negative controls. This stain is positive in the surrounding lymphocytes. This staining  pattern is non-specific and does not definitively differentiate between a lung and breast primary malignancy in this  right axilla mass biopsy. The staining profile of the current axillary mass match more closely with the previous  breast carcinoma (due to the p63 negativity in both the 2014 breast cancer and the current axillary mass lesion but  p63 positivity in the lung mass from 2018).  This staining profile, together with the comparison with the patient's prior breast tumor and prior lung tumor supports  a diagnosis of poorly differentiated carcinoma and the malignancy appears morphologically similar to the prior  malignancies from both sites, but the staining profile in this small sample from the axillary mass more closely  correlates with the prior breast malignancy. Pathologic subclassification of this malignancy's primary location is not  definitive and clinical correlation is recommended definitively decide on possible primary location in this patient's  axillary mass sample.  Supplemental (2):  Additional immunohistochemical staining for progesterone receptor and HER2 are completed per clinician request  with following results:  Progesterone receptor: Negative; 0% nuclear tumor cell staining.  HER2: Negative; stain score = 0.  Supplemental (3):  Ivel DIAGNOSIS:  FINAL DIAGNOSIS  Breast, right, needle core biopsy ([id number]; 06/17/2014): Invasive ductal carcinoma, Jaiden grade III (of  III), with focal micropapillary features.  Immunohistochemical stains performed at the referring institution show that the tumor cells have the following  phenotype: - Estrogen receptor: Negative (0% tumor cells staining). - Progesterone receptor: Negative (0% tumor  cells staining). - HER2: Negative (score 0).  Lymph nodes, right, sentinel biopsy and lumpectomy ([id number];  07/11/2014):  1. Right sentinel lymph node: A single (1) lymph node is negative for metastatic carcinoma.  Immunohistochemical stains performed by the referring institution and reviewed at [facility] for cytokeratin are  negative, confirming the diagnosis.  2. Right breast: Invasive ductal carcinoma with micropapillary features, per report 11.5 mm in greatest dimension.  Foci suspicious for lymphovascular invasion identified. Margins of resection are free of tumor.  Immunohistochemical stains performed by the referring institution and reviewed at [facility] show that the tumor  cells are negative for p63 and show patchy positivity for CK 5/6.  Lung, right, needle core biopsy ([id number]; 02/14/2018): Squamous cell carcinoma.    Immunohistochemical stains performed by the referring institution show the tumor cells are strongly positive for p63  and CK 5/6, while negative for TTF-1 and CK7. These result support the diagnosis. Axilla, right, needle core biopsy  ([id number]; 03/11/2019): Lymph node positive for metastatic carcinoma, most consistent with breast primary  (see comment).  Immunohistochemical stains performed by the referring institution and reviewed at [facility] show that the tumor  cells are negative for p63, focally positive for CK 5/6, focally and weakly positive for JUAREZ-3, and strongly positive  for CK7. They are also negative for estrogen receptor, progesterone receptor, and HER2 JAM (score 0).  COMMENT:  Thank you for allowing me to review the case of this 73-year-old lady who recently underwent needle core biopsies  of a right axillary mass and who has a previous diagnosis of an invasive ductal carcinoma of the right breast, as well  as a squamous cell carcinoma of the lung. I am reviewing this case because of my special interest in breast  pathology.  I agree with your interpretation of this case. In my opinion, the lung tumor corresponds to a squamous cell  carcinoma and appears to be  unrelated to the invasive ductal carcinoma of the breast. The axillary mass, in my  opinion, corresponds to metastases of the breast primary. Although it is a poorly differentiated carcinoma, the  immunophenotype is most consistent with the one that the breast primary exhibited, and is inconsistent with a  metastatic squamous cell carcinoma. I did share the case with Dr. Anabel Stone, one of our pulmonary pathologists,  and she concurs with this interpretation.  Note: Report attached.  Performing location:  [address] [address]      LYMPH NODE, RIGHT AXILLA, BIOPSY: 6/16/21   - Metastatic poorly-differentiated carcinoma, consistent with breast primary  -ER, NE, and HER2 immunohistochemical hormonal markers are also negative  PD-L1 (22C3) SemiQuant IHC, Manual  Interpretation  Right axillary lymph node , specimen for PD-L1 immunohistochemistry studies (clone 22C3, [employer], [place], [place]; using a proprietary detection system) ([id number]):  Reported carcinoma site of origin: Breast  Result: The percent of PD-L1 positive cells based upon the total number of tumor cells (combined positive  score, CPS) is greater than or equal to 10.  Interpretation: Studies suggest that positive PD-L1 immunohistochemistry in tumor cells and/or tumorassociated  immune cells may predict tumor response to therapy with immune checkpoint inhibitors. This  result should not be used as the sole factor in determining treatment, as other factors (for example, tumor  mutation burden, and microsatellite instability) have been also studied as predictive markers.          CT neck/chest/abd/pelvis w/contrast 4/26/2019   The previously described right hilar mass is no longer visible.  There is persistent volume loss in the right middle lobe this appears similar to the prior PET-CT February 21, 2019.    Persistent abnormal right axillary node today measuring about 15 mm  compared 18 mm prior.  The positioning of the adjacent nodes is slightly different likely accounting for the slight difference in measurement.    Cluster of tree-in-bud nodular densities left lower lobe series 2, image 93.  This may be related to small airways disease though serial follow-up suggested.      PET/CT 6/20/2019   New and worsening hypermetabolic lymph nodes concerning for metastatic breast cancer as described above.  Tissue sampling would be required for definitive diagnosis.      2-D echo 6/27/2019   · Normal left ventricular systolic function. The estimated ejection fraction is 55%  · Concentric left ventricular remodeling.  · Normal LV diastolic function.  · Normal right ventricular systolic function.  · Moderate left atrial enlargement.  · Mild right atrial enlargement.  · Mild aortic regurgitation.  · Mild mitral regurgitation.  · Mild tricuspid regurgitation.  · Normal central venous pressure (3 mm Hg).  · The estimated PA systolic pressure is 25 mm Hg      PET/CT 9/19/2019   Favorable response to therapy with interval decrease in size and uptake of several right axillary lymph nodes and a supraclavicular lymph node.  Mild residual activity may indicate viable tumor.    No new hypermetabolic findings worrisome for malignancy.    PET/CT 2/28/2020  1.  No evidence of hypermetabolic tumor.  Continued improvement of the right axilla status post adjuvant radiation.    2.  No new hypermetabolic lesions      CTA 6/17/2020  1. No evidence of pulmonary embolus. No aortic dissection.   2. There is no evidence for new or progressive disease in the chest as compared to PET/CT 6/20/2019 in this patient with history of right breast cancer and right lung neoplasm. The patient does have a more recent PET/CT 2/28/2020 from an outside facility per the electronic medical record although images are not available for direct comparison. Correlation with these images may be beneficial. Continued long-term surveillance  recommended.  3. Stable appearance of the treated tumor in the right hilum/middle lobe.  4. Stable treated right breast cancer and axillary adenopathy without evidence for developing breast mass or new right axillary adenopathy.  5. Stable tiny nodularity/tree-in-bud densities in the left lung, probably postinfectious or inflammatory.  6. Wall thickening of the esophagus may be correlated for esophagitis. Possible tiny hiatus hernia.  7. Slight bronchial wall thickening may be correlated for bronchitis.  8. Mild to moderate emphysema as before.  9. Reflux of contrast into the IVC and hepatic veins is nonspecific but may be correlated for elevated right heart pressures.  10. Coronary calcifications and/or stents similar to prior.    Echo 5/21/2020  Technically difficult study.   Normal left ventricular systolic function.   Left ventricular ejection fraction is estimated at 55%.   Severe mitral annular calcification.   Mild mitral valve regurgitation.   Aortic valve sclerosis without stenosis or regurgitation.     PFTs 6/17/2020  Spirometry reveals a very severe obstructive lung defect, which does not significantly improve post bronchodilators. Lung volumes revealed air trapping. Diffusing capacity was moderately impaired.        Del 6/26/2020  BI-RADS Category:   Overall: 2 - Benign      PET/CT 10/23/2020   Impression:     Stable mildly hypermetabolic right axillary lymph node/nodular density contralateral to the site of injection.  Differential considerations include metastatic lymph node, reactive lymph node from benign right upper extremity process, and fat necrosis.  Recommend physical examination of the upper extremity and consideration of ultrasound for further evaluation of the node/nodule and possible image guided biopsy.  Otherwise, attention on follow-up.     Otherwise, no other hypermetabolic disease identified      Mammo diagnostic right /US 11/4/2020   Impression:   1. No suspicious finding either on  mammogram or ultrasound at the site of the palpable lump in the right breast at 10:00 o'clock. A bilateral mammogram is recommended in June 2020 to return to the patient to annual screening.   2. Redemonstration of the morphologically abnormal lymph node in the right axilla that contains a biopsy clip, compatible with the known recurrence metastatic breast cancer that has been treated with chemotherapy. The appearance of this lymph node is unchanged from 06/26/2020 and overall appears smaller when compared to 03/11/2019.     Abd US 12/11/2020   Impression:     1. Echogenic liver likely representing hepatic steatosis.  2. Right upper quadrant ultrasound otherwise is unremarkable.     PET/CT 10/14/21     Impression:     1.  No definite evidence of hypermetabolic tumor.  Interval resolution of hypermetabolic right axillary lymph nodes.  No new hypermetabolic findings.     2.  Persistent low-grade diffuse cutaneous uptake which is nonspecific.    MRI shoulder w w/out contrast 1/26/22   Impression:     Mild edema and postcontrast enhancement at the myotendinous junction of the supraspinatus and infraspinatus, it is nonspecific and could be due to degenerative change or tendinosis.     Small area of interstitial tear at the footprint insertion of the infraspinatus tendon.     No large rotator cuff tear.     No advanced degenerative change.     No osseous lesions.     PET/CT 1/26/22  Impression:In this patient with breast cancer, there is no evidence of hypermetabolic tumor to suggest recurrent or metastatic disease.   Less extensive but, nonspecific, low-grade diffuse cutaneous uptake of the right breast.       PET/CT 4/27/22  Impression:     1.  No FDG avid disease to suggest recurrence or metastatic disease.     Unchanged right breast skin thickening with mild FDG uptake.       PET/CT 7/13/22   Impression:     In this patient with lung and breast cancer, there is new left lower lobe opacification with mild radiotracer  uptake which may represent aspiration, pneumonia, or malignancy.  Tissue sampling would be required for definitive diagnosis.     Unchanged right breast skin thickening with mild radiotracer uptake.    Assessment:       1. Recurrent breast cancer, unspecified laterality    2. 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 Neoplastic malignant related fatigue    4. Acute respiratory infection    5. Chronic obstructive pulmonary disease, unspecified COPD type    6. Hypomagnesemia    7. Encounter for screening mammogram for malignant neoplasm of breast         Plan:     1.,2.   Pt with hx of Stage 1A invasive ductal carcinoma rt breast s/p rt segmental mastectomy and SLN bx 7/11/2014 ER/MN neg HER 2 jose maria neg [id number](i-).  S/p adjuvant RT completed 9/2014 Pt declined adjuvant chemo  Pt  hospitalized 11/2017 with acute-on-chronic  resp failure  Abnormal CT imaging revealing Large right hilar mass with involvement of  adjacent segmental pulmonary arterial branches and bronchi with associated postobstructive atelectasis  and involvement of mediastinal and axillary adenopathy   S/p rt axillary LN bx ( outside facility) - metastatic poorly differentiated carcinoma of unknown primary  site  status post  lung biopsy 1/31/2017 -benign lung tissue  PET/CT 1/26/2018   Intense FDG uptake within the known right infrahilar lung mass, compatible with malignancy.   Intensely hypermetabolic right axillary, retropectoral, and lower right cervical lymph nodes, compatible with metastases.  Abnormal FDG uptake along right breast skin thickening, new from prior chest CTA and mammogram.    Repeat lung bx 2/14/2018 revealed Squamous Cell CA lung PD-L1 10% EGFR NEGALK NEG  Pt treated for advanced squamous cell CA of lung   She s/p  cycle 6 of carboplatin/Abraxane Day1 completed 7/2/2018 ( day 15 held due to prolonged cytopenias)  She completed therapy with near CR     PET/CT 2/21/2019 showed increased size and irregularity of the right axillary  lymph node with increased FDG avidity     MAMMO/US rt breast 3/2019  reveals suspicious findings rt axilla LN.  Biopsy of the lymph node measuring 1.4 x 1.1 x 1.3     Pt s/p  rt axillary LN bxPositive for poorly differentiated carcinoma.- likely breast primary ERneg PRneg Her 2 neg    Outside review of specimen(s) revealed  lung tumor corresponds to a squamous cell carcinoma and appears to be unrelated to the invasive ductal carcinoma of the breast. It was determined The axillary mass, in my opinion, corresponded  to metastases of the breast primary. Although it is a poorly differentiated carcinoma, theimmunophenotype is most consistent with the one that the breast primary exhibited, and is inconsistent with a metastatic squamous cell carcinoma.     CT imaging 4/26/2019 persistent rt axillary LAD, no other sites of disease     Lymph node biopsy 3/11/2019 Right axilla, mass, core biopsy:Positive for poorly differentiated carcinoma.favor breast primary     PET/CT 6/20/2019  New and worsening hypermetabolic lymph nodes concerning for metastatic breast cancer     Previously Discussed  imaging findings in detail with patient which reveals new and worsening hypermetabolic melena metabolic lymph nodes including hypermetabolic supraclavicular node.  Therefore, at treatment option will be systemic chemotherapy in light of the recent imaging findings.  She will be followed by her surgical oncologist Dr. Christopher      IT was determined to proceed with systemic chemotherapy   S/p  AC [id number] x 3 wks x 4 wks completed 9/6/2019   ( pt has not received in past)      PET/CT 9/19/2019 shows disease response with interval decrease in size and uptake of several right axillary lymph nodes and a supraclavicular lymph node.  Mild residual activity may indicate viable tumor.No new hypermetabolic findings worrisome for malignancy.    Pt followed by  Breast Surgery and plan was  for possible   ALND. Pt with Recurrent cancer in her right  axilla and right supraclavicular fossa.   She completed chemotherapy 9/6/2019 with near CR  It was determined  Radiation will be given to the original areas of hypermetabolic activity in the right axilla and right supraclavicular region    Pt completed  RT 12/16/2019     PET/CT 1/14/21 shows   New right axillary hypermetabolic lymph nodes with preserved normal architecture suggestive of reactive nodes.  Stable appearing previously identified hypermetabolic lymph node with mild increase in surrounding fat stranding.        Findings previously d/w with treating breast surgeon and it was determined to cont to monitor and close f/u as pt is heavily pre treated, has received RT to site   Pt acknowledged understanding and agreeable with plan     PET/CT imaging  5/20/21 shows Continued increase in both size and hypermetabolic activity of right axillary level 1 lymph nodes a new, mildly hypermetabolic cutaneous thickening of the right breast.     Right breast, skin, punch biopsy:Negative for carcinoma    LYMPH NODE, RIGHT AXILLA, BIOPSY: 6/16/21   - Metastatic poorly-differentiated carcinoma, consistent with breast primary triple neg  PD-L1 immunohistochemistry studies(combined positive score, CPS) is greater than or equal to 10   PET/CT showed  No definite evidence of hypermetabolic tumor.  Interval resolution of hypermetabolic right axillary lymph nodes.  No new hypermetabolic findings.      S/p C16 pembrolizumab 6/15/22   Last  PET/CT imaging shows  new left lower lobe opacification with mild radiotracer uptake which may represent aspiration, pneumonia, or malignancy.  Rx provided for Augmentin 875/125mg po bid x 10day  Rx for Tessalon perles  Hold IOT    3. IOT contributing factor  Pt with worsening fatigue affecting daily activities   Hold IOT    4. Rx provided for course of abx    5. F/b Pulmonology  Pt on supp 02 at night   Cont MDI and O2 as prescribed     6. IV Mag today in infusion ctr    7. Plan Follow-up  mammo     HOLD Immunotherapy next week  Mag 2gr IV today  Cbc,cmp, Mag, TSH, Free T4  prior to follow-up 1 month     I spent a total of 45 minutes on the day of the visit.This includes face to face time and non-face to face time preparing to see the patient (eg, review of tests), obtaining and/or reviewing separately obtained history, documenting clinical information in the electronic or other health record, independently interpreting results and communicating results to the patient/family/caregiver, and coordinating care.     Advance Care Planning     Power of   I previously  initiated the process of advance care planning today and explained the importance of this process to the patient.  I introduced the concept of advance directives to the patient, as well. Then the patient received detailed information about the importance of designating a Health Care Power of  (HCPOA). She was also instructed to communicate with this person about their wishes for future healthcare, should she become sick and lose decision-making capacity. The patient has previously appointed a HCPOA. After our discussion, the patient has decided to complete a HCPOA and has appointed her son and niece and  I spent a total time of 16 minutes discussing this issue with the patient.         Cc: Swetha Katz M.D.         MD Ranjan Roberson MD

## 2022-07-19 NOTE — PLAN OF CARE
Pt arrived to unit from 's office. Will be receiving 2g of mag. Most recent magnesium reviewed and is 1.5. Pt feeling better overall since her last visit. Pt's Keytruda put on hold for the time being. Tolerated magnesium well. Received discharge instructions along with next appointments. Discharged from unit in NAD.

## 2022-07-19 NOTE — Clinical Note
HOLD Immunotherapy next week Mag 2gr IV today Cbc,cmp, Mag, TSH, Free T4  prior to follow-up 1 month

## 2022-07-25 ENCOUNTER — ANESTHESIA EVENT (OUTPATIENT)
Dept: ENDOSCOPY | Facility: HOSPITAL | Age: 77
End: 2022-07-25
Payer: MEDICARE

## 2022-07-25 RX ORDER — LIDOCAINE HYDROCHLORIDE 10 MG/ML
1 INJECTION, SOLUTION EPIDURAL; INFILTRATION; INTRACAUDAL; PERINEURAL ONCE
Status: CANCELLED | OUTPATIENT
Start: 2022-07-25 | End: 2022-07-25

## 2022-07-25 RX ORDER — SODIUM CHLORIDE 9 MG/ML
INJECTION, SOLUTION INTRAVENOUS CONTINUOUS
Status: CANCELLED | OUTPATIENT
Start: 2022-07-25

## 2022-07-26 ENCOUNTER — HOSPITAL ENCOUNTER (OUTPATIENT)
Dept: RADIOLOGY | Facility: HOSPITAL | Age: 77
Discharge: HOME | End: 2022-07-26
Attending: INTERNAL MEDICINE
Payer: MEDICARE

## 2022-07-26 ENCOUNTER — HOSPITAL ENCOUNTER (OUTPATIENT)
Facility: HOSPITAL | Age: 77
Discharge: HOME | End: 2022-07-26
Attending: STUDENT IN AN ORGANIZED HEALTH CARE EDUCATION/TRAINING PROGRAM | Admitting: STUDENT IN AN ORGANIZED HEALTH CARE EDUCATION/TRAINING PROGRAM
Payer: MEDICARE

## 2022-07-26 ENCOUNTER — ANESTHESIA (OUTPATIENT)
Dept: ENDOSCOPY | Facility: HOSPITAL | Age: 77
End: 2022-07-26
Payer: MEDICARE

## 2022-07-26 VITALS
RESPIRATION RATE: 18 BRPM | SYSTOLIC BLOOD PRESSURE: 134 MMHG | TEMPERATURE: 97 F | OXYGEN SATURATION: 98 % | DIASTOLIC BLOOD PRESSURE: 72 MMHG | HEART RATE: 78 BPM

## 2022-07-26 DIAGNOSIS — C50.919 RECURRENT BREAST CANCER, UNSPECIFIED LATERALITY: ICD-10-CM

## 2022-07-26 DIAGNOSIS — Z12.31 ENCOUNTER FOR SCREENING MAMMOGRAM FOR MALIGNANT NEOPLASM OF BREAST: ICD-10-CM

## 2022-07-26 DIAGNOSIS — Z12.11 COLON CANCER SCREENING: ICD-10-CM

## 2022-07-26 PROCEDURE — 27201089 HC SNARE, DISP (ANY): Performed by: STUDENT IN AN ORGANIZED HEALTH CARE EDUCATION/TRAINING PROGRAM

## 2022-07-26 PROCEDURE — 45385 COLONOSCOPY W/LESION REMOVAL: CPT | Mod: PT | Performed by: STUDENT IN AN ORGANIZED HEALTH CARE EDUCATION/TRAINING PROGRAM

## 2022-07-26 PROCEDURE — 94640 AIRWAY INHALATION TREATMENT: CPT

## 2022-07-26 PROCEDURE — 88305 TISSUE EXAM BY PATHOLOGIST: CPT | Performed by: PATHOLOGY

## 2022-07-26 PROCEDURE — D9220A PRA ANESTHESIA: ICD-10-PCS | Mod: PT,CRNA,, | Performed by: STUDENT IN AN ORGANIZED HEALTH CARE EDUCATION/TRAINING PROGRAM

## 2022-07-26 PROCEDURE — 25000003 PHARM REV CODE 250: Performed by: STUDENT IN AN ORGANIZED HEALTH CARE EDUCATION/TRAINING PROGRAM

## 2022-07-26 PROCEDURE — 63600175 PHARM REV CODE 636 W HCPCS: Performed by: STUDENT IN AN ORGANIZED HEALTH CARE EDUCATION/TRAINING PROGRAM

## 2022-07-26 PROCEDURE — 77067 SCR MAMMO BI INCL CAD: CPT | Mod: 26,,, | Performed by: RADIOLOGY

## 2022-07-26 PROCEDURE — 45380 PR COLONOSCOPY,BIOPSY: ICD-10-PCS | Mod: PT,59,, | Performed by: STUDENT IN AN ORGANIZED HEALTH CARE EDUCATION/TRAINING PROGRAM

## 2022-07-26 PROCEDURE — 45385 PR COLONOSCOPY,REMV LESN,SNARE: ICD-10-PCS | Mod: PT,,, | Performed by: STUDENT IN AN ORGANIZED HEALTH CARE EDUCATION/TRAINING PROGRAM

## 2022-07-26 PROCEDURE — 88305 TISSUE EXAM BY PATHOLOGIST: CPT | Mod: 26,,, | Performed by: PATHOLOGY

## 2022-07-26 PROCEDURE — 37000008 HC ANESTHESIA 1ST 15 MINUTES: Performed by: STUDENT IN AN ORGANIZED HEALTH CARE EDUCATION/TRAINING PROGRAM

## 2022-07-26 PROCEDURE — 37000009 HC ANESTHESIA EA ADD 15 MINS: Performed by: STUDENT IN AN ORGANIZED HEALTH CARE EDUCATION/TRAINING PROGRAM

## 2022-07-26 PROCEDURE — 77067 MAMMO DIGITAL SCREENING BILAT WITH TOMO: ICD-10-PCS | Mod: 26,,, | Performed by: RADIOLOGY

## 2022-07-26 PROCEDURE — 45385 COLONOSCOPY W/LESION REMOVAL: CPT | Mod: PT,,, | Performed by: STUDENT IN AN ORGANIZED HEALTH CARE EDUCATION/TRAINING PROGRAM

## 2022-07-26 PROCEDURE — D9220A PRA ANESTHESIA: Mod: PT,ANES,, | Performed by: ANESTHESIOLOGY

## 2022-07-26 PROCEDURE — 77063 MAMMO DIGITAL SCREENING BILAT WITH TOMO: ICD-10-PCS | Mod: 26,,, | Performed by: RADIOLOGY

## 2022-07-26 PROCEDURE — 45380 COLONOSCOPY AND BIOPSY: CPT | Mod: PT,59 | Performed by: STUDENT IN AN ORGANIZED HEALTH CARE EDUCATION/TRAINING PROGRAM

## 2022-07-26 PROCEDURE — 88305 TISSUE EXAM BY PATHOLOGIST: ICD-10-PCS | Mod: 26,,, | Performed by: PATHOLOGY

## 2022-07-26 PROCEDURE — 27201012 HC FORCEPS, HOT/COLD, DISP: Performed by: STUDENT IN AN ORGANIZED HEALTH CARE EDUCATION/TRAINING PROGRAM

## 2022-07-26 PROCEDURE — 45380 COLONOSCOPY AND BIOPSY: CPT | Mod: PT,59,, | Performed by: STUDENT IN AN ORGANIZED HEALTH CARE EDUCATION/TRAINING PROGRAM

## 2022-07-26 PROCEDURE — 25000242 PHARM REV CODE 250 ALT 637 W/ HCPCS: Performed by: ANESTHESIOLOGY

## 2022-07-26 PROCEDURE — 77063 BREAST TOMOSYNTHESIS BI: CPT | Mod: 26,,, | Performed by: RADIOLOGY

## 2022-07-26 PROCEDURE — D9220A PRA ANESTHESIA: Mod: PT,CRNA,, | Performed by: STUDENT IN AN ORGANIZED HEALTH CARE EDUCATION/TRAINING PROGRAM

## 2022-07-26 PROCEDURE — D9220A PRA ANESTHESIA: ICD-10-PCS | Mod: PT,ANES,, | Performed by: ANESTHESIOLOGY

## 2022-07-26 PROCEDURE — 77067 SCR MAMMO BI INCL CAD: CPT | Mod: TC

## 2022-07-26 RX ORDER — KETAMINE HYDROCHLORIDE 50 MG/ML
INJECTION, SOLUTION INTRAMUSCULAR; INTRAVENOUS
Status: DISCONTINUED | OUTPATIENT
Start: 2022-07-26 | End: 2022-07-26

## 2022-07-26 RX ORDER — LIDOCAINE HYDROCHLORIDE 20 MG/ML
INJECTION, SOLUTION EPIDURAL; INFILTRATION; INTRACAUDAL; PERINEURAL
Status: DISCONTINUED
Start: 2022-07-26 | End: 2022-07-26 | Stop reason: HOSPADM

## 2022-07-26 RX ORDER — LIDOCAINE HYDROCHLORIDE 20 MG/ML
INJECTION INTRAVENOUS
Status: DISCONTINUED | OUTPATIENT
Start: 2022-07-26 | End: 2022-07-26

## 2022-07-26 RX ORDER — IPRATROPIUM BROMIDE AND ALBUTEROL SULFATE 2.5; .5 MG/3ML; MG/3ML
3 SOLUTION RESPIRATORY (INHALATION)
Status: COMPLETED | OUTPATIENT
Start: 2022-07-26 | End: 2022-07-26

## 2022-07-26 RX ORDER — PROPOFOL 10 MG/ML
VIAL (ML) INTRAVENOUS
Status: DISCONTINUED | OUTPATIENT
Start: 2022-07-26 | End: 2022-07-26

## 2022-07-26 RX ORDER — SODIUM CHLORIDE 9 MG/ML
INJECTION, SOLUTION INTRAVENOUS CONTINUOUS
Status: DISCONTINUED | OUTPATIENT
Start: 2022-07-26 | End: 2022-07-26 | Stop reason: HOSPADM

## 2022-07-26 RX ORDER — KETAMINE HYDROCHLORIDE 100 MG/ML
INJECTION, SOLUTION INTRAMUSCULAR; INTRAVENOUS
Status: DISCONTINUED
Start: 2022-07-26 | End: 2022-07-26 | Stop reason: HOSPADM

## 2022-07-26 RX ORDER — PROPOFOL 10 MG/ML
INJECTION, EMULSION INTRAVENOUS
Status: DISCONTINUED
Start: 2022-07-26 | End: 2022-07-26 | Stop reason: HOSPADM

## 2022-07-26 RX ADMIN — SODIUM CHLORIDE: 0.9 INJECTION, SOLUTION INTRAVENOUS at 08:07

## 2022-07-26 RX ADMIN — KETAMINE HYDROCHLORIDE 10 MG: 50 INJECTION, SOLUTION, CONCENTRATE INTRAMUSCULAR; INTRAVENOUS at 09:07

## 2022-07-26 RX ADMIN — LIDOCAINE HYDROCHLORIDE 50 MG: 20 INJECTION, SOLUTION INTRAVENOUS at 09:07

## 2022-07-26 RX ADMIN — PROPOFOL 50 MG: 10 INJECTION, EMULSION INTRAVENOUS at 09:07

## 2022-07-26 RX ADMIN — PROPOFOL 20 MG: 10 INJECTION, EMULSION INTRAVENOUS at 09:07

## 2022-07-26 RX ADMIN — IPRATROPIUM BROMIDE AND ALBUTEROL SULFATE 3 ML: .5; 3 SOLUTION RESPIRATORY (INHALATION) at 08:07

## 2022-07-26 NOTE — ANESTHESIA POSTPROCEDURE EVALUATION
Anesthesia Post Evaluation    Patient: Ade Castellano    Procedure(s) Performed: Procedure(s) (LRB):  COLONOSCOPY (N/A)    Final Anesthesia Type: general      Patient location during evaluation: GI PACU  Patient participation: Yes- Able to Participate  Level of consciousness: awake and alert  Post-procedure vital signs: reviewed and stable  Pain management: adequate  Airway patency: patent    PONV status at discharge: No PONV  Anesthetic complications: no      Cardiovascular status: blood pressure returned to baseline  Respiratory status: unassisted and spontaneous ventilation  Hydration status: euvolemic  Follow-up not needed.          Vitals Value Taken Time   /74 07/26/22 1039   Temp 36.3 °C (97.3 °F) 07/26/22 0939   Pulse 63 07/26/22 0939   Resp 20 07/26/22 0954   SpO2 100 % 07/26/22 0939         No case tracking events are documented in the log.      Pain/Sergio Score: Sergio Score: 10 (7/26/2022 10:39 AM)

## 2022-07-26 NOTE — TRANSFER OF CARE
Anesthesia Transfer of Care Note    Patient: Ade Castellano    Procedure(s) Performed: Procedure(s) (LRB):  COLONOSCOPY (N/A)    Patient location: GI    Anesthesia Type: general    Transport from OR: Transported from OR on room air with adequate spontaneous ventilation    Post pain: adequate analgesia    Post assessment: no apparent anesthetic complications and tolerated procedure well    Post vital signs: stable    Level of consciousness: sedated and responds to stimulation    Nausea/Vomiting: no nausea/vomiting    Complications: none    Transfer of care protocol was followed      Last vitals:   Visit Vitals  BP (!) 114/57 (BP Location: Left arm, Patient Position: Lying)   Pulse 63   Temp 36.3 °C (97.3 °F) (Axillary)   Resp 18   LMP  (LMP Unknown)   SpO2 100%   Breastfeeding No

## 2022-07-26 NOTE — H&P
Short Stay Endoscopy History and Physical    PCP - Jeannine Huitron MD  Referring Physician - Sofía Connell, NP  [address]  [place] [id number]    Procedure - Colonoscopy  ASA - per anesthesia  Mallampati - per anesthesia  History of Anesthesia problems - no  Family history Anesthesia problems -  no   Plan of anesthesia - General    HPI  76 y.o. female  Reason for procedure:   Screening [Z13.9]  History of colon polyps [Z86.010]        ROS:  Constitutional: No fevers, chills, No weight loss  CV: No chest pain  Pulm: No cough, No shortness of breath  GI: see HPI    Medical History:  has a past medical history of Abnormal stress test (8/5/2020), Acute respiratory failure with hypoxia and hypercapnia (11/29/2017), Angina pectoris, Arthritis, Bell's palsy, Breast cancer, CAD (coronary artery disease), Cervical cancer, Chronic bronchitis, Chronic heart failure with preserved ejection fraction (8/5/2020), Chronic heart failure with preserved ejection fraction (8/5/2020), COPD (chronic obstructive pulmonary disease), Dental bridge present, Emphysema of lung, H/O colonoscopy (06/2017), History of Bell's palsy, History of heart artery stent, Hyperlipidemia, Hypertension, Lung cancer, Myocardial infarction, SUNITHA (obstructive sleep apnea), SUNITHA (obstructive sleep apnea), Pneumonia, Pneumonia due to other staphylococcus, PUD (peptic ulcer disease), Severe anemia (6/11/2018), Sleep apnea, and Vaginal delivery.    Surgical History:  has a past surgical history that includes Cervix surgery; Breast surgery; Tonsillectomy; Adenoidectomy; sweat glands axillary regions (Bilateral); Colonoscopy (N/A, 3/17/2017); Breast biopsy (Right); Colonoscopy (N/A, 6/30/2017); Portacath placement (Right, 01/2018); Colonoscopy (N/A, 11/28/2018); Colonoscopy (N/A, 1/29/2019); Eye surgery (Bilateral, 06/08/2018); Breast lumpectomy; Left heart catheterization (Left, 8/13/2020); Esophagogastroduodenoscopy (N/A, 1/25/2021); and  Coronary angioplasty with stent.    Family History: family history includes Breast cancer in her mother; COPD in her sister; Cancer in her father, maternal grandfather, maternal grandmother, mother, paternal grandfather, paternal grandmother, sister, and sister; Heart attack in her sister; Heart disease in her maternal grandfather, maternal grandmother, mother, and sister; Kidney cancer in her son..    Social History:  reports that she quit smoking about 4 years ago. Her smoking use included cigarettes. She has a 12.50 pack-year smoking history. She has never used smokeless tobacco. She reports that she does not drink alcohol and does not use drugs.    Review of patient's allergies indicates:  No Known Allergies    Medications:   Medications Prior to Admission   Medication Sig Dispense Refill Last Dose    albuterol (PROVENTIL) 2.5 mg /3 mL (0.083 %) nebulizer solution NEBULIZE 1 vial EVERY 6 HOURS AS NEEDED FOR WHEEZING 540 mL 1 7/25/2022 at Unknown time    amoxicillin-clavulanate 875-125mg (AUGMENTIN) 875-125 mg per tablet Take 1 tablet by mouth 2 (two) times daily. for 10 days 20 tablet 0 7/25/2022 at Unknown time    aspirin (ECOTRIN) 81 MG EC tablet Take 81 mg by mouth once daily.    Past Week at Unknown time    benzonatate (TESSALON PERLES) 100 MG capsule Take 1 capsule (100 mg total) by mouth every 6 (six) hours as needed for Cough. 30 capsule 1 7/25/2022 at Unknown time    fluticasone propionate (FLONASE) 50 mcg/actuation nasal spray USE TWO SPRAYS IN EACH NOSTRIL ONCE A DAY 16 g 5 7/25/2022 at Unknown time    isosorbide mononitrate (IMDUR) 30 MG 24 hr tablet Take 1 tablet (30 mg total) by mouth once daily. Hold if SBP <120 30 tablet 11 7/25/2022 at Unknown time    montelukast (SINGULAIR) 10 mg tablet TAKE 1 TABLET BY MOUTH EVERY EVENING 30 tablet 2 7/25/2022 at Unknown time    omeprazole (PRILOSEC) 40 MG capsule Take 40 mg by mouth once daily.   7/25/2022 at Unknown time    potassium chloride  (MICRO-K) 10 MEQ CpSR Take 1 capsule (10 mEq total) by mouth 2 (two) times daily. 60 capsule 11 7/25/2022 at Unknown time    rosuvastatin (CRESTOR) 10 MG tablet TAKE 1 TABLET BY MOUTH EVERY DAY (Patient taking differently: Take 10 mg by mouth every evening.) 30 tablet 11 7/25/2022 at Unknown time    acetaminophen (TYLENOL) 650 MG TbSR Take 650 mg by mouth every 8 (eight) hours.       albuterol (VENTOLIN HFA) 90 mcg/actuation inhaler INHALE 2 PUFF(S) BY MOUTH EVERY 4 - 6 HOURS AS NEEDED FOR SHORTNESS OF BREATH or WHEEZING 18 g 5     carboxymethylcellulose (REFRESH PLUS) 0.5 % Dpet 1 drop 3 (three) times daily as needed.       heparin, porcine, PF, (HEPARIN FLUSH 100 UNITS/ML) 100 unit/mL Syrg        KEYTRUDA 25 mg/mL Soln Inject into the vein every 21 days.       meclizine (ANTIVERT) 25 mg tablet Take 1 tablet (25 mg total) by mouth 2 (two) times daily as needed for Dizziness. 30 tablet 2     nitroGLYCERIN (NITROSTAT) 0.4 MG SL tablet PLACE 1 TAB UNDER TONGUE EVERY 5 MINUTES x 3 DOSES AS NEEDED FOR CHEST PAIN. IF NOT RESOLVED CALL 911 90 tablet 0     TRELEGY ELLIPTA 100-62.5-25 mcg DsDv INHALE ONE PUFF INTO THE LUNGS ONCE A DAY  5        Physical Exam:    Vital Signs:   Vitals:    07/26/22 0844   BP: 126/64   Pulse: (!) 51   Resp: 20   Temp: 97.6 °F (36.4 °C)       General Appearance: Well appearing in no acute distress  Abdomen: Soft, non tender, non distended with normal bowel sounds, no masses    Labs:  Lab Results   Component Value Date    WBC 6.38 07/19/2022    HGB 12.8 07/19/2022    HCT 39.1 07/19/2022     07/19/2022    CHOL 139 09/18/2020    TRIG 191 (H) 09/18/2020    HDL 51 09/18/2020    ALT 14 07/19/2022    AST 16 07/19/2022     07/19/2022    K 4.3 07/19/2022     07/19/2022    CREATININE 0.9 07/19/2022    BUN 13 07/19/2022    CO2 28 07/19/2022    TSH 2.162 07/19/2022    INR 1.0 09/18/2020    HGBA1C 5.9 (H) 04/07/2022       I have explained the risks and benefits of this endoscopic  procedure to the patient including but not limited to bleeding, inflammation, infection, perforation, and death.      James Healy MD

## 2022-07-26 NOTE — OR NURSING
PROCEDURE AND RECOVERY COMPLETED; DR. CHENG DISCUSSED FINDINGS WITH PATIENT AND FAMILY MEMBER; DISCHARGE INSTRUCTIONS GIVEN AND UNDERSTANDING VERBALIZED; DENIES SOB, PAIN, DIZZINESS UPON SITTING UP ON SIDE OF STRETCHER; NAD NOTED. PATIENT READY FOR DISCHARGE.

## 2022-07-26 NOTE — ANESTHESIA PREPROCEDURE EVALUATION
07/26/2022  Ade Castellano is a 76 y.o., female.      Pre-op Assessment    I have reviewed the Patient Summary Reports.    I have reviewed the NPO Status.   I have reviewed the Medications.     Review of Systems  Anesthesia Hx:  No problems with previous Anesthesia  History of prior surgery of interest to airway management or planning:  Denies Personal Hx of Anesthesia complications.   Hematology/Oncology:        Current/Recent Cancer. Other (see Oncology comments) and Breast radiation and chemotherapy Oncology Comments: Lung CA    Cardiovascular:   Exercise tolerance: good Hypertension Past MI CAD  CABG/stent  LOGAN ECG has been reviewed.    Pulmonary:   Pneumonia (amoxicillin since 7/19/22) COPD, moderate Shortness of breath Sleep Apnea    Education provided regarding risk of obstructive sleep apnea     Hepatic/GI:   Bowel Prep. PUD,    Neurological:   Neuromuscular Disease,        Physical Exam  General: Cooperative, Alert and Oriented    Airway:  Mallampati: III   Mouth Opening: Small, but > 3cm  TM Distance: 4 - 6 cm  Tongue: Normal    Dental:  Dentures, Partial Dentures    Chest/Lungs:  Decreased Breath Sounds: left and right  expiratory wheezes        Anesthesia Plan  Type of Anesthesia, risks & benefits discussed:    Anesthesia Type: MAC, Gen ETT, Gen Natural Airway  Intra-op Monitoring Plan: Standard ASA Monitors  Induction:  IV  Informed Consent: Informed consent signed with the Patient and all parties understand the risks and agree with anesthesia plan.  All questions answered.   ASA Score: 3  Day of Surgery Review of History & Physical: H&P Update referred to the surgeon/provider.  Anesthesia Plan Notes: Duo Neb preop    Ready For Surgery From Anesthesia Perspective.     .

## 2022-07-26 NOTE — NURSING
SOB on exertion. Breath sounds diminished. Non productive cough. SAT's 96% on room air. MD notified.

## 2022-07-26 NOTE — [employer] PATIENT INSTRUCTIONS
Discharge Summary/Instructions after an Endoscopic Procedure  Patient Name: Ade Castellano  Patient MRN: [id number]  Patient YOB: 1945  Tuesday, July 26, 2022  James Healy MD  Dear patient,  As a result of recent federal legislation (The Federal Cures Act), you may   receive lab or pathology results from your procedure in your [facility]   account before your physician is able to contact you. Your physician or   their representative will relay the results to you with their   recommendations at their soonest availability.  Thank you,  RESTRICTIONS:  During your procedure today, you received medications for sedation.  These   medications may affect your judgment, balance and coordination.  Therefore,   for 24 hours, you have the following restrictions:   - DO NOT drive a car, operate machinery, make legal/financial decisions,   sign important papers or drink alcohol.    ACTIVITY:  Today: no heavy lifting, straining or running due to procedural   sedation/anesthesia.  The following day: return to full activity including work.  DIET:  Eat and drink normally unless instructed otherwise.     TREATMENT FOR COMMON SIDE EFFECTS:  - Mild abdominal pain, nausea, belching, bloating or excessive gas:  rest,   eat lightly and use a heating pad.  - Sore Throat: treat with throat lozenges and/or gargle with warm salt   water.  - Because air was used during the procedure, expelling large amounts of air   from your rectum or belching is normal.  - If a bowel prep was taken, you may not have a bowel movement for 1-3 days.    This is normal.  SYMPTOMS TO WATCH FOR AND REPORT TO YOUR PHYSICIAN:  1. Abdominal pain or bloating, other than gas cramps.  2. Chest pain.  3. Back pain.  4. Signs of infection such as: chills or fever occurring within 24 hours   after the procedure.  5. Rectal bleeding, which would show as bright red, maroon, or black stools.   (A tablespoon of blood from the rectum is not serious, especially  if   hemorrhoids are present.)  6. Vomiting.  7. Weakness or dizziness.  GO DIRECTLY TO THE NEAREST EMERGENCY ROOM IF YOU HAVE ANY OF THE FOLLOWING:      Difficulty breathing              Chills and/or fever over 101 F   Persistent vomiting and/or vomiting blood   Severe abdominal pain   Severe chest pain   Black, tarry stools   Bleeding- more than one tablespoon   Any other symptom or condition that you feel may need urgent attention  Your doctor recommends these additional instructions:  If any biopsies were taken, your doctors clinic will contact you in 1 to 2   weeks with any results.  - Repeat colonoscopy is not recommended due to current age (66 years or   older) for surveillance.   - Patient has a contact number available for emergencies.  The signs and   symptoms of potential delayed complications were discussed with the   patient.  Return to normal activities tomorrow.  Written discharge   instructions were provided to the patient.   - Discharge patient to home.   - Await pathology results.  For questions, problems or results please call your physician - James Healy MD at Work:  [phone].  [facility] can be reached at [phone]     IF A COMPLICATION OR EMERGENCY SITUATION ARISES AND YOU ARE UNABLE TO REACH   YOUR PHYSICIAN - GO DIRECTLY TO THE EMERGENCY ROOM.  James Healy MD  7/26/2022 9:42:09 AM  This report has been verified and signed electronically.  Dear patient,  As a result of recent federal legislation (The Federal Cures Act), you may   receive lab or pathology results from your procedure in your [facility]   account before your physician is able to contact you. Your physician or   their representative will relay the results to you with their   recommendations at their soonest availability.  Thank you,  [employer]

## 2022-07-27 LAB — POCT GLUCOSE: 128 MG/DL (ref 70–110)

## 2022-07-28 LAB
FINAL PATHOLOGIC DIAGNOSIS: NORMAL
GROSS: NORMAL
Lab: NORMAL

## 2022-07-29 ENCOUNTER — TELEPHONE (OUTPATIENT)
Dept: ENDOSCOPY | Facility: HOSPITAL | Age: 77
End: 2022-07-29
Payer: MEDICARE

## 2022-07-29 NOTE — TELEPHONE ENCOUNTER
----- Message from James Healy MD sent at 7/29/2022 11:56 AM CDT -----  Please let the patient know her polyps were adenomas, but benign.  No further colonoscopies needed for screening given her age.    James Healy MD

## 2022-08-03 NOTE — ASSESSMENT & PLAN NOTE
Patient with metastatic non-small cell lung cancer undergoing chemotherapy with carboplatin Abraxane    She completed cycle 5 of carboplatin/Abraxane on 06/18/2018      She will follow up as an outpatient as scheduled this week   [employer] Preamble Text (Leave Blank If You Do Not Want): Undermining was performed with blunt dissection.

## 2022-08-18 NOTE — PROGRESS NOTES
Subjective:       Patient ID: Ade Castellano is a 76 y.o. female.    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 The PET(+) disease in the right axilla and right supraclavicular fossa will be boosted at 200 cGy per fraction to 6000 cGy total dose.  S/p LYMPH NODE, RIGHT AXILLA, BIOPSY: 6/16/21 . Metastatic poorly-differentiated carcinoma, c/w breast primary ERnegPRneg Her2 neg  PD-L1 (22C3) IHC CPS> is greater than or equal to 10  Pembrolizumab 7/22/21 -present        Prior Hx:  77 y/o female with history of  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  And Rt  breast Haja/p  cycle 6 of carboplatin/Abraxane 7/2/2018   She has  History of Stage 1A  Rt breast cancer Grade 3, ER/MD neg , Her 2 jose maria neg s/p lumpectomy 7/11/2014 and s/p adjuvant RT 9/2014 Pt declined Adjuvant chemo.Pathology showed a 1.15 cm, grade 3 infiltrating ductal carcinoma.  One sentinel lymph node was negative for malignancy.  Margins were negative and the closest margin was 1 cm.  She was staged  pT1c  pN0 cM0 stage IA.  She declined adjuvant chemo therapy.  She completed post operative radiation therapy at 400 cGy per fraction to 4000 cGy 9/2014  Pt hospitalized 11/2017 for  acute on chronic respiratory failure requiring intubation and ventilation. Has large lung mass in right lung with probable post obstructive pneumonia resulting in COPD  exacerbation.CTA chest 11/29/2017 revealed   Large right hilar mass with involvement of adjacent segmental pulmonary arterial branches and bronchi with associated postobstructive atelectasis and volume loss in the RML  with adjacent ground glass opacity concerning for underlying neoplastic process. Mediastinal and axillary adenopathy, with a right axillary lymph node measuring up to 2.0 cm in short axis diameter.She underwent rt axillary LN bx at outside facility- on 1/16/2018 at [facility]. Pathology revealed metastatic poorly differentiated carcinoma of unknown primary site. She next underwent lung bx at [facility] 1/31/2018  benign lung tissue. Repeat Right Lung Bx 2/14/2018 Pathology reveals Squamous cell carcinoma PD-L1 10% low expression EGFR NEG ALK NEG BRAF NEG. Outside slides axillary LN specimen were reviewed/comparison to lung bx findings . She completed    cycle 6 of carboplatin/Abraxane completed 7/2018 .PET/CT  4/19/2018 revealed there has been a excellent response to therapy.  At least 90% reduction in malignant activity.PET/CT 2/21/2019 increased size and irregularity of the right axillary lymph node with increased FDG avidityMAMMO/US rt breast 2/2019 revealed suspicious findings rt axilla LN.  Right axilla, mass, core biopsy: Positive for poorly differentiated carcinoma breast primary ER neg/MI neg Her2 neg.[facility] reviewIt was determined  the lung tumor corresponds to a squamous cellcarcinoma and appears to be unrelated to the invasive ductal carcinoma of the breast. The axillary mass, in myopinion, corresponds to metastases of the breast primary. Although it is a poorly differentiated carcinoma, theimmunophenotype is most consistent with the one that the breast primary exhibited, and is inconsistent with metastatic squamous cell carcinoma. She was treated with  AC ( adriamycin 60m/m2/Cytoxan 600mg/m2)  q21d x 4 cycles completed 9/6/2019 . PET/CT 9/19/2019 shows disease response  "l decrease in size and uptake of several right axillary lymph nodes and a supraclavicular lymph node.  Mild residual activity may indicate viable tumor.Pt followed by Rad/Onc. She was presented at [facility] tumor board and it was determined to proceed Radiation therapy only. No ALND due to concurrent lung and supraclavicular node. She  completed  RT 12/16/2019  To right axilla and right supraclavicular region PET/CT imaging  5/20/21 shows Continued increase in both size and hypermetabolic activity of right axillary level 1 lymph nodes a new, mildly hypermetabolic cutaneous thickening of the right breast. she underwent LYMPH NODE, RIGHT AXILLA, BIOPSY: 6/16/21 . Pathology showed Metastatic poorly-differentiated carcinoma, consistent with breast primary-ERneg/ PRneg  HER2  neg  Pt started on pembrolizumab 7/2021  Pt hospitalized 4/6/22 with hypokalemia and orthostatic hypotension        Interval Hx:S/p C16 pembrolizumab 6/15/22  ( 400mg q6wks)  Pt treated with course of abx last f/u   Cough improved  Chronic LOGAN-stabe  No fevers  No fatigue  Appetite improved  She presented to urgent care for pain in rt ear  She reports " rumbling like water"   She reports nasal congestion improved with flonase  She has supp 02 at [facility]  Pox 99% RA 02 sats today    PET /CT 7/13/22 shows  new left lower lobe opacification with mild radiotracer uptake which may represent aspiration, pneumonia, or malignancy.  Tissue sampling would be required for definitive diagnosis.    She is followed by Dr. Katz, pulmonology    She also has been followed by GI for dysphagia  In past  She has undergone dilation      Last Mammo 6/16/21  No mammographic evidence of malignancy.BI-RADS Category 1: Negative      PET/CT 1/26/22 No definite evidence of hypermetabolic tumor.  Interval resolution of hypermetabolic right axillary lymph nodes.  No new hypermetabolic findings.        PrevHx:She had an abnormal mammogram 6/4/2014 whichRevealed a round solid " mass 6mm in rt breast.She then underwent U/S guided core bx of rt breast mass on 6/17/2014.Pathology revealed infiltrating ductal carcinoma Grade 3 with tumorPresent in thin-walled spaces suggestive of lymphatic spaces. HormoneReceptor status on tumor specimen revealed ER negative 0% ,OH negative 0% and Her 2 jose maria negative.She subsequently underwent rt segmental mastectomy and SLN bxOn 7/11/2014. Pathology of rt breast lumpectomy revealed invasiveDuctal carcinoma with micropapillary pattern ( Invasive micropapillaryCa) with max tumor dimension 11.5mm with suggestion of tumor in Thin walled spaces c/w lymphovascular involvement. SurgicalMargins free of tumor, grade 3, ER/OH neg , Her 2 jose maria neg With [place] Lymph node negative for Neoplasm. Pathologic staging [id number](i-)Her mother was diagnosed with breast cancer in her 50's/        Review of Systems   Constitutional: Positive for appetite change ( improved). No fatigue, activity change,  fever.   HENT: Negative for mouth sores, rhinorrhea and trouble swallowing.    Eyes: Negative for visual disturbance.   Respiratory: Positive for cough (improved ) and shortness of breath (improved). Negative for wheezing.    Cardiovascular: Negative for chest pain.   Gastrointestinal: Negative for abdominal pain and diarrhea.   Genitourinary: Negative for frequency.   Musculoskeletal: Negative for back pain.   Skin: Negative for rash.   Neurological: Negative for dizziness and headaches.   Hematological: Negative for adenopathy.   Psychiatric/Behavioral: The patient is not nervous/anxious.        Objective:          Vitals:    08/22/22 0827   BP: 139/80   BP Location: Left arm   Patient Position: Sitting   BP Method: Large (Automatic)   Pulse: (!) 56   SpO2: 98%   Weight: 72 kg (158 lb 11.7 oz)   Height: 5' (1.524 m)         Physical Exam   Constitutional: She is oriented to person, place, and time. She appears ill, coughing episodes  HENT:   Head: Normocephalic.   Eyes:  Conjunctivae and lids are normal.No scleral icterus.   Neck: Normal range of motion. Neck supple. No thyromegaly present.   Cardiovascular: Normal rate, regular rhythm and normal heart sounds.    No murmur heard.  Pulmonary/Chest: Breath sounds normal. She has no wheezes. She has no rales.   Abdominal: Soft. Bowel sounds are normal.  There is no tenderness. There is no rebound and no guarding.   Left breast- no masses or axillary LAD noted  Right breast- breast thickening inner quad    She has no cervical adenopathy.     She has rt axillary adenopathy.        Right: No supraclavicular adenopathy present.        Left: No supraclavicular adenopathy present.   Neurological: She is alert and oriented to person, place, and time. No cranial nerve deficit. Coordination normal.   Skin: Skin is warm and dry. No ecchymosis, no petechiae and no rash noted. No erythema.   Psychiatric: She has a normal mood and affect.             CT a/p w/contrast 11/29/2018   1. No acute abnormality identified within the abdomen and pelvis.    2.  There are a few nonspecific prominent lymph nodes in the upper abdomen including a periesophageal lymph node which measures 1.0 cm in short axis diameter.    3.  Significant abdominal aortic atherosclerosis and abdominal aorta ectasia.    4.  Additional findings as above.    PET/CT 1/26/2018   1.  Intense FDG uptake within the known right infrahilar lung mass, compatible with malignancy.  It is unclear if this represents primary lung malignancy or metastatic breast cancer.    2.  Intensely hypermetabolic right axillary, retropectoral, and lower right cervical lymph nodes, compatible with metastases.    3.  Abnormal FDG uptake along right breast skin thickening, new from prior chest CTA and mammogram.  This may relate to localized edema/inflammation, though correlation with physical exam and mammography are recommended to exclude underlying inflammatory carcinoma.      MRI Brain w w/out contrast  2/9/2018  1.  No evidence of intracranial metastases.  2.  Sinus disease       Rt axillary LN bx at outside facility- on 1/16/2018 at [facility]  Pathology revealed metastatic poorly differentiated carcinoma of unknown primary  site 1/16/2018 at [facility]  TTF-1 negative, napsin negative  , cytokeratin 7 positive, cytokeratin 20 negative, p63 negative, cytokeratin 5/6 focal dim positivity    LUNG BIOPSY 1/31/2018  Pathology revealed benign lung tissue         SPECIMEN  1) Right Lung Bx 2/14/2018  Supplemental Diagnosis  Immunohistochemical stains show strong nuclear staining for p63 in essentially all tumor cells and very strong  cytoplasmic and membrane staining for CK5/6, also in essentially all tumor cells. TTF-1 and CK7 are negative  within tumor cells but do stain native pulmonary elements present within the biopsy. A stain for mucicarmine is  negative. Positive and negative controls function appropriately.  Final diagnosis: Specimen submitted as right lung biopsy  -Squamous cell carcinoma  (Electronically Signed: 2018-02-20 09:12:07 )  Diagnosed by: Phong Thompson  FINAL PATHOLOGIC DIAGNOSIS  Fragments of pulmonary parenchyma (submitted as right lung biopsy):    FINAL PATHOLOGIC DIAGNOSIS 1. Lymph node, right axilla, biopsy, review of 10 outside slides [facility], JS [phone],  collected January 11, 2018: Metastatic poorly differentiated carcinoma (see comment).  The histologic section shows fibrous stroma infiltrated by nest of poorly differentiated malignant cells including  occasional dyskeratotic cells morphologically suggestive of metastatic squamous cell carcinoma.  Immunohistochemical stains are nonspecific; the cells are positive for cytokeratin 7 and cytokeratin 5/6 (focal) and  negative for cytokeratin 20, TTF-1, p63, GCDFP, mammoglobin, and CEA        PET/CT 2/21/2019  Increased size and irregularity of the right axillary lymph node with increased FDG  avidity.  Although this would be atypical in location for lung carcinoma, the increased size and avidity must be concerning for metastatic disease in this patient with history of squamous cell lung cancer.     US Rt breast and mammo 2/26/2019  Impression:  Right  Lymph Node: Right axilla lymph node. Assessment: 4 - Suspicious finding. Biopsy is recommended.      BI-RADS Category:   Right: 4 - Suspicious  Overall: 4 - Suspicious     Recommendation:  Biopsy is recommended. Biopsy of the lymph node measuring 1.4 x 1.1 x 1.3 recommended , the one with the round shape configuration, corresponding to the most recent PET CT finding.         Pathology 3/11/2019   FINAL PATHOLOGIC DIAGNOSIS  Right axilla, mass, core biopsy:  Positive for poorly differentiated carcinoma.  See comment.  Comment:  The biopsy from the right axilla mass shows a poorly differentiated carcinoma in background lymphoid tissue  with enlarged cells showing increased nuclear size, prominent nucleoli, moderate amounts of cytoplasm and mitotic  figures. Immunostains are completed and reveal the tumor cells to stain positively with cytokeratin AE 1/AE3, CK  5/6 and CK 7. The tumor cells are negative for CK 20, Estrogen receptor, p63 and TTF-1. All stains have  satisfactory positive and negative controls. The patient's prior cases will be reviewed and an additional stain for  JUAREZ-3 is pending in an attempt to pinpoint the primary site of this malignancy and results will follow in a  supplemental report.      Supplemental Diagnosis  3/11/2019  The current axillary mass is compared to the patient's prior right lung biopsy (case number [id number]) and the  current axillary mass is morphologically similar to the lung malignancy. Also, the current axillary mass is compared  to the patient's prior breast resection (Case number [id number]) and appears similar as well. P63 is negative in the  [id number] tumor and CK 5/6 shows scattered positive staining in the  [id number] tumor.  Immunostain for JUAREZ-3 is completed and shows only rare weak to moderate staining within the tumor cell nuclei  with satisfactory positive and negative controls. This stain is positive in the surrounding lymphocytes. This staining  pattern is non-specific and does not definitively differentiate between a lung and breast primary malignancy in this  right axilla mass biopsy. The staining profile of the current axillary mass match more closely with the previous  breast carcinoma (due to the p63 negativity in both the 2014 breast cancer and the current axillary mass lesion but  p63 positivity in the lung mass from 2018).  This staining profile, together with the comparison with the patient's prior breast tumor and prior lung tumor supports  a diagnosis of poorly differentiated carcinoma and the malignancy appears morphologically similar to the prior  malignancies from both sites, but the staining profile in this small sample from the axillary mass more closely  correlates with the prior breast malignancy. Pathologic subclassification of this malignancy's primary location is not  definitive and clinical correlation is recommended definitively decide on possible primary location in this patient's  axillary mass sample.  Supplemental (2):  Additional immunohistochemical staining for progesterone receptor and HER2 are completed per clinician request  with following results:  Progesterone receptor: Negative; 0% nuclear tumor cell staining.  HER2: Negative; stain score = 0.  Supplemental (3):  [place] DIAGNOSIS:  FINAL DIAGNOSIS  Breast, right, needle core biopsy ([id number]; 06/17/2014): Invasive ductal carcinoma, Jaiden grade III (of  III), with focal micropapillary features.  Immunohistochemical stains performed at the referring institution show that the tumor cells have the following  phenotype: - Estrogen receptor: Negative (0% tumor cells staining). - Progesterone receptor: Negative (0% tumor  cells  staining). - HER2: Negative (score 0).  Lymph nodes, right, sentinel biopsy and lumpectomy ([id number]; 07/11/2014):  1. Right sentinel lymph node: A single (1) lymph node is negative for metastatic carcinoma.  Immunohistochemical stains performed by the referring institution and reviewed at [facility] for cytokeratin are  negative, confirming the diagnosis.  2. Right breast: Invasive ductal carcinoma with micropapillary features, per report 11.5 mm in greatest dimension.  Foci suspicious for lymphovascular invasion identified. Margins of resection are free of tumor.  Immunohistochemical stains performed by the referring institution and reviewed at [facility] show that the tumor  cells are negative for p63 and show patchy positivity for CK 5/6.  Lung, right, needle core biopsy ([id number]; 02/14/2018): Squamous cell carcinoma.    Immunohistochemical stains performed by the referring institution show the tumor cells are strongly positive for p63  and CK 5/6, while negative for TTF-1 and CK7. These result support the diagnosis. Axilla, right, needle core biopsy  ([id number]; 03/11/2019): Lymph node positive for metastatic carcinoma, most consistent with breast primary  (see comment).  Immunohistochemical stains performed by the referring institution and reviewed at [facility] show that the tumor  cells are negative for p63, focally positive for CK 5/6, focally and weakly positive for JUAREZ-3, and strongly positive  for CK7. They are also negative for estrogen receptor, progesterone receptor, and HER2 JAM (score 0).  COMMENT:  Thank you for allowing me to review the case of this 73-year-old lady who recently underwent needle core biopsies  of a right axillary mass and who has a previous diagnosis of an invasive ductal carcinoma of the right breast, as well  as a squamous cell carcinoma of the lung. I am reviewing this case because of my special interest in breast  pathology.  I agree with your interpretation of this  case. In my opinion, the lung tumor corresponds to a squamous cell  carcinoma and appears to be unrelated to the invasive ductal carcinoma of the breast. The axillary mass, in my  opinion, corresponds to metastases of the breast primary. Although it is a poorly differentiated carcinoma, the  immunophenotype is most consistent with the one that the breast primary exhibited, and is inconsistent with a  metastatic squamous cell carcinoma. I did share the case with Dr. Anabel Stone, one of our pulmonary pathologists,  and she concurs with this interpretation.  Note: Report attached.  Performing location:  [address] [address]      LYMPH NODE, RIGHT AXILLA, BIOPSY: 6/16/21   - Metastatic poorly-differentiated carcinoma, consistent with breast primary  -ER, NH, and HER2 immunohistochemical hormonal markers are also negative  PD-L1 (22C3) SemiQuant IHC, Manual  Interpretation  Right axillary lymph node , specimen for PD-L1 immunohistochemistry studies (clone 22C3, [employer], [place], [place]; using a proprietary detection system) ([id number]):  Reported carcinoma site of origin: Breast  Result: The percent of PD-L1 positive cells based upon the total number of tumor cells (combined positive  score, CPS) is greater than or equal to 10.  Interpretation: Studies suggest that positive PD-L1 immunohistochemistry in tumor cells and/or tumorassociated  immune cells may predict tumor response to therapy with immune checkpoint inhibitors. This  result should not be used as the sole factor in determining treatment, as other factors (for example, tumor  mutation burden, and microsatellite instability) have been also studied as predictive markers.          CT neck/chest/abd/pelvis w/contrast 4/26/2019   The previously described right hilar mass is no longer visible.  There is persistent volume loss in the right middle lobe this appears similar to the prior  PET-CT February 21, 2019.    Persistent abnormal right axillary node today measuring about 15 mm compared 18 mm prior.  The positioning of the adjacent nodes is slightly different likely accounting for the slight difference in measurement.    Cluster of tree-in-bud nodular densities left lower lobe series 2, image 93.  This may be related to small airways disease though serial follow-up suggested.      PET/CT 6/20/2019   New and worsening hypermetabolic lymph nodes concerning for metastatic breast cancer as described above.  Tissue sampling would be required for definitive diagnosis.      2-D echo 6/27/2019   · Normal left ventricular systolic function. The estimated ejection fraction is 55%  · Concentric left ventricular remodeling.  · Normal LV diastolic function.  · Normal right ventricular systolic function.  · Moderate left atrial enlargement.  · Mild right atrial enlargement.  · Mild aortic regurgitation.  · Mild mitral regurgitation.  · Mild tricuspid regurgitation.  · Normal central venous pressure (3 mm Hg).  · The estimated PA systolic pressure is 25 mm Hg      PET/CT 9/19/2019   Favorable response to therapy with interval decrease in size and uptake of several right axillary lymph nodes and a supraclavicular lymph node.  Mild residual activity may indicate viable tumor.    No new hypermetabolic findings worrisome for malignancy.    PET/CT 2/28/2020  1.  No evidence of hypermetabolic tumor.  Continued improvement of the right axilla status post adjuvant radiation.    2.  No new hypermetabolic lesions      CTA 6/17/2020  1. No evidence of pulmonary embolus. No aortic dissection.   2. There is no evidence for new or progressive disease in the chest as compared to PET/CT 6/20/2019 in this patient with history of right breast cancer and right lung neoplasm. The patient does have a more recent PET/CT 2/28/2020 from an outside facility per the electronic medical record although images are not available for  direct comparison. Correlation with these images may be beneficial. Continued long-term surveillance recommended.  3. Stable appearance of the treated tumor in the right hilum/middle lobe.  4. Stable treated right breast cancer and axillary adenopathy without evidence for developing breast mass or new right axillary adenopathy.  5. Stable tiny nodularity/tree-in-bud densities in the left lung, probably postinfectious or inflammatory.  6. Wall thickening of the esophagus may be correlated for esophagitis. Possible tiny hiatus hernia.  7. Slight bronchial wall thickening may be correlated for bronchitis.  8. Mild to moderate emphysema as before.  9. Reflux of contrast into the IVC and hepatic veins is nonspecific but may be correlated for elevated right heart pressures.  10. Coronary calcifications and/or stents similar to prior.    Echo 5/21/2020  Technically difficult study.   Normal left ventricular systolic function.   Left ventricular ejection fraction is estimated at 55%.   Severe mitral annular calcification.   Mild mitral valve regurgitation.   Aortic valve sclerosis without stenosis or regurgitation.     PFTs 6/17/2020  Spirometry reveals a very severe obstructive lung defect, which does not significantly improve post bronchodilators. Lung volumes revealed air trapping. Diffusing capacity was moderately impaired.        Del 6/26/2020  BI-RADS Category:   Overall: 2 - Benign      PET/CT 10/23/2020   Impression:     Stable mildly hypermetabolic right axillary lymph node/nodular density contralateral to the site of injection.  Differential considerations include metastatic lymph node, reactive lymph node from benign right upper extremity process, and fat necrosis.  Recommend physical examination of the upper extremity and consideration of ultrasound for further evaluation of the node/nodule and possible image guided biopsy.  Otherwise, attention on follow-up.     Otherwise, no other hypermetabolic disease  identified      Mammo diagnostic right /US 11/4/2020   Impression:   1. No suspicious finding either on mammogram or ultrasound at the site of the palpable lump in the right breast at 10:00 o'clock. A bilateral mammogram is recommended in June 2020 to return to the patient to annual screening.   2. Redemonstration of the morphologically abnormal lymph node in the right axilla that contains a biopsy clip, compatible with the known recurrence metastatic breast cancer that has been treated with chemotherapy. The appearance of this lymph node is unchanged from 06/26/2020 and overall appears smaller when compared to 03/11/2019.     Abd US 12/11/2020   Impression:     1. Echogenic liver likely representing hepatic steatosis.  2. Right upper quadrant ultrasound otherwise is unremarkable.     PET/CT 10/14/21     Impression:     1.  No definite evidence of hypermetabolic tumor.  Interval resolution of hypermetabolic right axillary lymph nodes.  No new hypermetabolic findings.     2.  Persistent low-grade diffuse cutaneous uptake which is nonspecific.    MRI shoulder w w/out contrast 1/26/22   Impression:     Mild edema and postcontrast enhancement at the myotendinous junction of the supraspinatus and infraspinatus, it is nonspecific and could be due to degenerative change or tendinosis.     Small area of interstitial tear at the footprint insertion of the infraspinatus tendon.     No large rotator cuff tear.     No advanced degenerative change.     No osseous lesions.     PET/CT 1/26/22  Impression:In this patient with breast cancer, there is no evidence of hypermetabolic tumor to suggest recurrent or metastatic disease.   Less extensive but, nonspecific, low-grade diffuse cutaneous uptake of the right breast.       PET/CT 4/27/22  Impression:     1.  No FDG avid disease to suggest recurrence or metastatic disease.     Unchanged right breast skin thickening with mild FDG uptake.       PET/CT 7/13/22   Impression:     In this  patient with lung and breast cancer, there is new left lower lobe opacification with mild radiotracer uptake which may represent aspiration, pneumonia, or malignancy.  Tissue sampling would be required for definitive diagnosis.     Unchanged right breast skin thickening with mild radiotracer uptake.      Mammo 7/26/22  BI-RADS Category:   Overall: 2 - Benign    Assessment:       1. Recurrent breast cancer, unspecified laterality    2. Abnormal PET scan of lung    3. Immunodeficiency due to chemotherapy    4. Chronic obstructive pulmonary disease, unspecified COPD type        Plan:     1.,2.,3.   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  S/p adjuvant RT completed 9/2014 Pt declined adjuvant chemo  Pt  hospitalized 11/2017 with acute-on-chronic  resp failure  Abnormal CT imaging revealing Large right hilar mass with involvement of  adjacent segmental pulmonary arterial branches and bronchi with associated postobstructive atelectasis  and involvement of mediastinal and axillary adenopathy   S/p rt axillary LN bx ( outside facility) - metastatic poorly differentiated carcinoma of unknown primary  site  status post  lung biopsy 1/31/2017 -benign lung tissue  PET/CT 1/26/2018   Intense FDG uptake within the known right infrahilar lung mass, compatible with malignancy.   Intensely hypermetabolic right axillary, retropectoral, and lower right cervical lymph nodes, compatible with metastases.  Abnormal FDG uptake along right breast skin thickening, new from prior chest CTA and mammogram.    Repeat lung bx 2/14/2018 revealed Squamous Cell CA lung PD-L1 10% EGFR NEGALK NEG  Pt treated for advanced squamous cell CA of lung   She s/p  cycle 6 of carboplatin/Abraxane Day1 completed 7/2/2018 ( day 15 held due to prolonged cytopenias)  She completed therapy with near CR     PET/CT 2/21/2019 showed increased size and irregularity  of the right axillary lymph node with increased FDG avidity     MAMMO/US rt breast 3/2019  reveals suspicious findings rt axilla LN.  Biopsy of the lymph node measuring 1.4 x 1.1 x 1.3     Pt s/p  rt axillary LN bxPositive for poorly differentiated carcinoma.- likely breast primary ERneg PRneg Her 2 neg    Outside review of specimen(s) revealed  lung tumor corresponds to a squamous cell carcinoma and appears to be unrelated to the invasive ductal carcinoma of the breast. It was determined The axillary mass, in my opinion, corresponded  to metastases of the breast primary. Although it is a poorly differentiated carcinoma, theimmunophenotype is most consistent with the one that the breast primary exhibited, and is inconsistent with a metastatic squamous cell carcinoma.     CT imaging 4/26/2019 persistent rt axillary LAD, no other sites of disease     Lymph node biopsy 3/11/2019 Right axilla, mass, core biopsy:Positive for poorly differentiated carcinoma.favor breast primary     PET/CT 6/20/2019  New and worsening hypermetabolic lymph nodes concerning for metastatic breast cancer     Previously Discussed  imaging findings in detail with patient which reveals new and worsening hypermetabolic melena metabolic lymph nodes including hypermetabolic supraclavicular node.  Therefore, at treatment option will be systemic chemotherapy in light of the recent imaging findings.  She will be followed by her surgical oncologist Dr. Christopher      IT was determined to proceed with systemic chemotherapy   S/p  AC [id number] x 3 wks x 4 wks completed 9/6/2019   ( pt has not received in past)      PET/CT 9/19/2019 shows disease response with interval decrease in size and uptake of several right axillary lymph nodes and a supraclavicular lymph node.  Mild residual activity may indicate viable tumor.No new hypermetabolic findings worrisome for malignancy.    Pt followed by  Breast Surgery and plan was  for possible   ALND. Pt with Recurrent  cancer in her right axilla and right supraclavicular fossa.   She completed chemotherapy 9/6/2019 with near CR  It was determined  Radiation will be given to the original areas of hypermetabolic activity in the right axilla and right supraclavicular region    Pt completed  RT 12/16/2019     PET/CT 1/14/21 shows   New right axillary hypermetabolic lymph nodes with preserved normal architecture suggestive of reactive nodes.  Stable appearing previously identified hypermetabolic lymph node with mild increase in surrounding fat stranding.        Findings previously d/w with treating breast surgeon and it was determined to cont to monitor and close f/u as pt is heavily pre treated, has received RT to site   Pt acknowledged understanding and agreeable with plan     PET/CT imaging  5/20/21 shows Continued increase in both size and hypermetabolic activity of right axillary level 1 lymph nodes a new, mildly hypermetabolic cutaneous thickening of the right breast.     Right breast, skin, punch biopsy:Negative for carcinoma    LYMPH NODE, RIGHT AXILLA, BIOPSY: 6/16/21   - Metastatic poorly-differentiated carcinoma, consistent with breast primary triple neg  PD-L1 immunohistochemistry studies(combined positive score, CPS) is greater than or equal to 10   PET/CT showed  No definite evidence of hypermetabolic tumor.  Interval resolution of hypermetabolic right axillary lymph nodes.  No new hypermetabolic findings.      S/p C16 pembrolizumab 6/15/22   Last  PET/CT imaging shows  new left lower lobe opacification with mild radiotracer uptake which may represent aspiration, pneumonia, or malignancy.  Completed  Augmentin 875/125mg po bid x 10day  Plan follow-up imaging studies       4. F/b Pulmonology  Pt on supp 02 at night   Cont MDI and O2 as prescribed       Plan CT this week   Chemo next week   Cbc,cmp, Mag, TSH, Free T4  prior to follow-up 4 weeks      Advance Care Planning     Power of   I previously  initiated the  process of advance care planning today and explained the importance of this process to the patient.  I introduced the concept of advance directives to the patient, as well. Then the patient received detailed information about the importance of designating a Health Care Power of  (HCPOA). She was also instructed to communicate with this person about their wishes for future healthcare, should she become sick and lose decision-making capacity. The patient has previously appointed a HCPOA. After our discussion, the patient has decided to complete a HCPOA and has appointed her son and niece and  I spent a total time of 16 minutes discussing this issue with the patient.         Cc: Swetha Katz M.D.         MD Ranjan Roberson MD

## 2022-08-22 ENCOUNTER — OFFICE VISIT (OUTPATIENT)
Dept: HEMATOLOGY/ONCOLOGY | Facility: CLINIC | Age: 77
End: 2022-08-22
Payer: MEDICARE

## 2022-08-22 ENCOUNTER — LAB VISIT (OUTPATIENT)
Dept: LAB | Facility: HOSPITAL | Age: 77
End: 2022-08-22
Attending: INTERNAL MEDICINE
Payer: MEDICARE

## 2022-08-22 VITALS
DIASTOLIC BLOOD PRESSURE: 80 MMHG | SYSTOLIC BLOOD PRESSURE: 139 MMHG | BODY MASS INDEX: 31.17 KG/M2 | OXYGEN SATURATION: 98 % | HEART RATE: 56 BPM | WEIGHT: 158.75 LBS | HEIGHT: 60 IN

## 2022-08-22 DIAGNOSIS — Z79.899 IMMUNODEFICIENCY DUE TO CHEMOTHERAPY: ICD-10-CM

## 2022-08-22 DIAGNOSIS — R94.2 ABNORMAL PET SCAN OF LUNG: ICD-10-CM

## 2022-08-22 DIAGNOSIS — J44.9 CHRONIC OBSTRUCTIVE PULMONARY DISEASE, UNSPECIFIED COPD TYPE: ICD-10-CM

## 2022-08-22 DIAGNOSIS — E03.2 HYPOTHYROIDISM DUE TO DRUGS: ICD-10-CM

## 2022-08-22 DIAGNOSIS — E83.42 HYPOMAGNESEMIA: ICD-10-CM

## 2022-08-22 DIAGNOSIS — C50.919 METASTATIC BREAST CANCER: ICD-10-CM

## 2022-08-22 DIAGNOSIS — D84.821 IMMUNODEFICIENCY DUE TO CHEMOTHERAPY: ICD-10-CM

## 2022-08-22 DIAGNOSIS — C34.91 SQUAMOUS CELL CARCINOMA OF RIGHT LUNG: ICD-10-CM

## 2022-08-22 DIAGNOSIS — Z17.1 MALIGNANT NEOPLASM OF CENTRAL PORTION OF RIGHT BREAST IN FEMALE, ESTROGEN RECEPTOR NEGATIVE: ICD-10-CM

## 2022-08-22 DIAGNOSIS — C50.919 RECURRENT BREAST CANCER, UNSPECIFIED LATERALITY: ICD-10-CM

## 2022-08-22 DIAGNOSIS — C50.919 RECURRENT BREAST CANCER, UNSPECIFIED LATERALITY: Primary | ICD-10-CM

## 2022-08-22 DIAGNOSIS — T45.1X5A IMMUNODEFICIENCY DUE TO CHEMOTHERAPY: ICD-10-CM

## 2022-08-22 DIAGNOSIS — C50.111 MALIGNANT NEOPLASM OF CENTRAL PORTION OF RIGHT BREAST IN FEMALE, ESTROGEN RECEPTOR NEGATIVE: ICD-10-CM

## 2022-08-22 LAB
ALBUMIN SERPL BCP-MCNC: 3.7 G/DL (ref 3.5–5.2)
ALP SERPL-CCNC: 54 U/L (ref 55–135)
ALT SERPL W/O P-5'-P-CCNC: 14 U/L (ref 10–44)
ANION GAP SERPL CALC-SCNC: 12 MMOL/L (ref 8–16)
AST SERPL-CCNC: 16 U/L (ref 10–40)
BILIRUB SERPL-MCNC: 0.5 MG/DL (ref 0.1–1)
BUN SERPL-MCNC: 19 MG/DL (ref 8–23)
CALCIUM SERPL-MCNC: 9.4 MG/DL (ref 8.7–10.5)
CHLORIDE SERPL-SCNC: 107 MMOL/L (ref 95–110)
CO2 SERPL-SCNC: 21 MMOL/L (ref 23–29)
CREAT SERPL-MCNC: 0.9 MG/DL (ref 0.5–1.4)
ERYTHROCYTE [DISTWIDTH] IN BLOOD BY AUTOMATED COUNT: 14.8 % (ref 11.5–14.5)
EST. GFR  (NO RACE VARIABLE): >60 ML/MIN/1.73 M^2
GLUCOSE SERPL-MCNC: 120 MG/DL (ref 70–110)
HCT VFR BLD AUTO: 40.2 % (ref 37–48.5)
HGB BLD-MCNC: 13.3 G/DL (ref 12–16)
IMM GRANULOCYTES # BLD AUTO: 0.02 K/UL (ref 0–0.04)
MAGNESIUM SERPL-MCNC: 1.6 MG/DL (ref 1.6–2.6)
MCH RBC QN AUTO: 30 PG (ref 27–31)
MCHC RBC AUTO-ENTMCNC: 33.1 G/DL (ref 32–36)
MCV RBC AUTO: 91 FL (ref 82–98)
NEUTROPHILS # BLD AUTO: 2.6 K/UL (ref 1.8–7.7)
PLATELET # BLD AUTO: 178 K/UL (ref 150–450)
PMV BLD AUTO: 9.5 FL (ref 9.2–12.9)
POTASSIUM SERPL-SCNC: 4.1 MMOL/L (ref 3.5–5.1)
PROT SERPL-MCNC: 6.8 G/DL (ref 6–8.4)
RBC # BLD AUTO: 4.44 M/UL (ref 4–5.4)
SODIUM SERPL-SCNC: 140 MMOL/L (ref 136–145)
T4 FREE SERPL-MCNC: 0.86 NG/DL (ref 0.71–1.51)
TSH SERPL DL<=0.005 MIU/L-ACNC: 2.74 UIU/ML (ref 0.4–4)
WBC # BLD AUTO: 4.53 K/UL (ref 3.9–12.7)

## 2022-08-22 PROCEDURE — 3075F PR MOST RECENT SYSTOLIC BLOOD PRESS GE 130-139MM HG: ICD-10-PCS | Mod: CPTII,S$GLB,, | Performed by: INTERNAL MEDICINE

## 2022-08-22 PROCEDURE — 80053 COMPREHEN METABOLIC PANEL: CPT | Performed by: INTERNAL MEDICINE

## 2022-08-22 PROCEDURE — 1101F PR PT FALLS ASSESS DOC 0-1 FALLS W/OUT INJ PAST YR: ICD-10-PCS | Mod: CPTII,S$GLB,, | Performed by: INTERNAL MEDICINE

## 2022-08-22 PROCEDURE — 85027 COMPLETE CBC AUTOMATED: CPT | Performed by: INTERNAL MEDICINE

## 2022-08-22 PROCEDURE — 99499 RISK ADDL DX/OHS AUDIT: ICD-10-PCS | Mod: HCNC,S$GLB,, | Performed by: INTERNAL MEDICINE

## 2022-08-22 PROCEDURE — 1157F ADVNC CARE PLAN IN RCRD: CPT | Mod: CPTII,S$GLB,, | Performed by: INTERNAL MEDICINE

## 2022-08-22 PROCEDURE — 84439 ASSAY OF FREE THYROXINE: CPT | Performed by: INTERNAL MEDICINE

## 2022-08-22 PROCEDURE — 3044F HG A1C LEVEL LT 7.0%: CPT | Mod: CPTII,S$GLB,, | Performed by: INTERNAL MEDICINE

## 2022-08-22 PROCEDURE — 3044F PR MOST RECENT HEMOGLOBIN A1C LEVEL <7.0%: ICD-10-PCS | Mod: CPTII,S$GLB,, | Performed by: INTERNAL MEDICINE

## 2022-08-22 PROCEDURE — 3288F PR FALLS RISK ASSESSMENT DOCUMENTED: ICD-10-PCS | Mod: CPTII,S$GLB,, | Performed by: INTERNAL MEDICINE

## 2022-08-22 PROCEDURE — 1101F PT FALLS ASSESS-DOCD LE1/YR: CPT | Mod: CPTII,S$GLB,, | Performed by: INTERNAL MEDICINE

## 2022-08-22 PROCEDURE — 99999 PR PBB SHADOW E&M-EST. PATIENT-LVL III: ICD-10-PCS | Mod: PBBFAC,,, | Performed by: INTERNAL MEDICINE

## 2022-08-22 PROCEDURE — 99214 OFFICE O/P EST MOD 30 MIN: CPT | Mod: S$GLB,,, | Performed by: INTERNAL MEDICINE

## 2022-08-22 PROCEDURE — 99499 UNLISTED E&M SERVICE: CPT | Mod: HCNC,S$GLB,, | Performed by: INTERNAL MEDICINE

## 2022-08-22 PROCEDURE — 83735 ASSAY OF MAGNESIUM: CPT | Performed by: INTERNAL MEDICINE

## 2022-08-22 PROCEDURE — 99999 PR PBB SHADOW E&M-EST. PATIENT-LVL III: CPT | Mod: PBBFAC,,, | Performed by: INTERNAL MEDICINE

## 2022-08-22 PROCEDURE — 3075F SYST BP GE 130 - 139MM HG: CPT | Mod: CPTII,S$GLB,, | Performed by: INTERNAL MEDICINE

## 2022-08-22 PROCEDURE — 3288F FALL RISK ASSESSMENT DOCD: CPT | Mod: CPTII,S$GLB,, | Performed by: INTERNAL MEDICINE

## 2022-08-22 PROCEDURE — 3079F DIAST BP 80-89 MM HG: CPT | Mod: CPTII,S$GLB,, | Performed by: INTERNAL MEDICINE

## 2022-08-22 PROCEDURE — 1157F PR ADVANCE CARE PLAN OR EQUIV PRESENT IN MEDICAL RECORD: ICD-10-PCS | Mod: CPTII,S$GLB,, | Performed by: INTERNAL MEDICINE

## 2022-08-22 PROCEDURE — 84443 ASSAY THYROID STIM HORMONE: CPT | Performed by: INTERNAL MEDICINE

## 2022-08-22 PROCEDURE — 36415 COLL VENOUS BLD VENIPUNCTURE: CPT | Performed by: INTERNAL MEDICINE

## 2022-08-22 PROCEDURE — 3079F PR MOST RECENT DIASTOLIC BLOOD PRESSURE 80-89 MM HG: ICD-10-PCS | Mod: CPTII,S$GLB,, | Performed by: INTERNAL MEDICINE

## 2022-08-22 PROCEDURE — 1126F PR PAIN SEVERITY QUANTIFIED, NO PAIN PRESENT: ICD-10-PCS | Mod: CPTII,S$GLB,, | Performed by: INTERNAL MEDICINE

## 2022-08-22 PROCEDURE — 99214 PR OFFICE/OUTPT VISIT, EST, LEVL IV, 30-39 MIN: ICD-10-PCS | Mod: S$GLB,,, | Performed by: INTERNAL MEDICINE

## 2022-08-22 PROCEDURE — 1126F AMNT PAIN NOTED NONE PRSNT: CPT | Mod: CPTII,S$GLB,, | Performed by: INTERNAL MEDICINE

## 2022-08-25 ENCOUNTER — CLINICAL SUPPORT (OUTPATIENT)
Dept: AUDIOLOGY | Facility: CLINIC | Age: 77
End: 2022-08-25
Payer: MEDICARE

## 2022-08-25 ENCOUNTER — OFFICE VISIT (OUTPATIENT)
Dept: OTOLARYNGOLOGY | Facility: CLINIC | Age: 77
End: 2022-08-25
Payer: MEDICARE

## 2022-08-25 ENCOUNTER — HOSPITAL ENCOUNTER (OUTPATIENT)
Dept: RADIOLOGY | Facility: HOSPITAL | Age: 77
Discharge: HOME | End: 2022-08-25
Attending: INTERNAL MEDICINE
Payer: MEDICARE

## 2022-08-25 VITALS — WEIGHT: 158 LBS | HEIGHT: 60 IN | BODY MASS INDEX: 31.02 KG/M2

## 2022-08-25 DIAGNOSIS — H65.93 MIDDLE EAR EFFUSION, BILATERAL: ICD-10-CM

## 2022-08-25 DIAGNOSIS — R94.2 ABNORMAL PET SCAN OF LUNG: ICD-10-CM

## 2022-08-25 DIAGNOSIS — H90.6 MIXED HEARING LOSS, BILATERAL: Primary | ICD-10-CM

## 2022-08-25 DIAGNOSIS — H69.93 DYSFUNCTION OF BOTH EUSTACHIAN TUBES: Primary | ICD-10-CM

## 2022-08-25 DIAGNOSIS — C50.919 RECURRENT BREAST CANCER, UNSPECIFIED LATERALITY: ICD-10-CM

## 2022-08-25 PROCEDURE — 92550 TYMPANOMETRY & REFLEX THRESH: CPT | Mod: S$GLB,,, | Performed by: AUDIOLOGIST

## 2022-08-25 PROCEDURE — 1101F PR PT FALLS ASSESS DOC 0-1 FALLS W/OUT INJ PAST YR: ICD-10-PCS | Mod: CPTII,S$GLB,, | Performed by: OTOLARYNGOLOGY

## 2022-08-25 PROCEDURE — 3288F PR FALLS RISK ASSESSMENT DOCUMENTED: ICD-10-PCS | Mod: CPTII,S$GLB,, | Performed by: OTOLARYNGOLOGY

## 2022-08-25 PROCEDURE — 71260 CT THORAX DX C+: CPT | Mod: 26,,, | Performed by: RADIOLOGY

## 2022-08-25 PROCEDURE — 1159F MED LIST DOCD IN RCRD: CPT | Mod: CPTII,S$GLB,, | Performed by: OTOLARYNGOLOGY

## 2022-08-25 PROCEDURE — 71260 CT CHEST WITH CONTRAST: ICD-10-PCS | Mod: 26,,, | Performed by: RADIOLOGY

## 2022-08-25 PROCEDURE — 1157F ADVNC CARE PLAN IN RCRD: CPT | Mod: CPTII,S$GLB,, | Performed by: OTOLARYNGOLOGY

## 2022-08-25 PROCEDURE — 3044F PR MOST RECENT HEMOGLOBIN A1C LEVEL <7.0%: ICD-10-PCS | Mod: CPTII,S$GLB,, | Performed by: OTOLARYNGOLOGY

## 2022-08-25 PROCEDURE — 99203 PR OFFICE/OUTPT VISIT, NEW, LEVL III, 30-44 MIN: ICD-10-PCS | Mod: S$GLB,,, | Performed by: OTOLARYNGOLOGY

## 2022-08-25 PROCEDURE — 71260 CT THORAX DX C+: CPT | Mod: TC

## 2022-08-25 PROCEDURE — 1101F PT FALLS ASSESS-DOCD LE1/YR: CPT | Mod: CPTII,S$GLB,, | Performed by: OTOLARYNGOLOGY

## 2022-08-25 PROCEDURE — 25500020 PHARM REV CODE 255: Performed by: INTERNAL MEDICINE

## 2022-08-25 PROCEDURE — 92557 COMPREHENSIVE HEARING TEST: CPT | Mod: S$GLB,,, | Performed by: AUDIOLOGIST

## 2022-08-25 PROCEDURE — 1159F PR MEDICATION LIST DOCUMENTED IN MEDICAL RECORD: ICD-10-PCS | Mod: CPTII,S$GLB,, | Performed by: OTOLARYNGOLOGY

## 2022-08-25 PROCEDURE — 3288F FALL RISK ASSESSMENT DOCD: CPT | Mod: CPTII,S$GLB,, | Performed by: OTOLARYNGOLOGY

## 2022-08-25 PROCEDURE — 92550 PR TYMPANOMETRY AND REFLEX THRESHOLD MEASUREMENTS: ICD-10-PCS | Mod: S$GLB,,, | Performed by: AUDIOLOGIST

## 2022-08-25 PROCEDURE — 3044F HG A1C LEVEL LT 7.0%: CPT | Mod: CPTII,S$GLB,, | Performed by: OTOLARYNGOLOGY

## 2022-08-25 PROCEDURE — 1126F AMNT PAIN NOTED NONE PRSNT: CPT | Mod: CPTII,S$GLB,, | Performed by: OTOLARYNGOLOGY

## 2022-08-25 PROCEDURE — 99203 OFFICE O/P NEW LOW 30 MIN: CPT | Mod: S$GLB,,, | Performed by: OTOLARYNGOLOGY

## 2022-08-25 PROCEDURE — 92557 PR COMPREHENSIVE HEARING TEST: ICD-10-PCS | Mod: S$GLB,,, | Performed by: AUDIOLOGIST

## 2022-08-25 PROCEDURE — 1126F PR PAIN SEVERITY QUANTIFIED, NO PAIN PRESENT: ICD-10-PCS | Mod: CPTII,S$GLB,, | Performed by: OTOLARYNGOLOGY

## 2022-08-25 PROCEDURE — 1157F PR ADVANCE CARE PLAN OR EQUIV PRESENT IN MEDICAL RECORD: ICD-10-PCS | Mod: CPTII,S$GLB,, | Performed by: OTOLARYNGOLOGY

## 2022-08-25 RX ADMIN — IOHEXOL 75 ML: 350 INJECTION, SOLUTION INTRAVENOUS at 10:08

## 2022-08-25 NOTE — PROGRESS NOTES
Ms. Ade Castellano was seen in the clinic today for an audiological evaluation.    Audiological testing revealed a mild to moderately-severe mixed hearing loss for the right ear and a mild to moderate mixed hearing loss for the left ear.  A speech reception threshold was obtained at 45 dBHL for the right ear and at 35 dBHL for the left ear.  Speech discrimination was 100% for the right ear and 100% for the left ear.      Tympanometry testing revealed a Type B (normal ECV) tympanogram for the right ear and a Type B (normal ECV) tympanogram for the left ear.  Ipsilateral acoustic reflexes were absent for the right ear and were absent for the left ear.    Recommendations:  1. Otologic evaluation  2. Repeat audiological evaluation, as needed  3. Hearing protection when in noise     Please click on link to view Audiogram:  Document on 8/25/2022  9:50 AM by AROLDO BrownleeD: Audiogram

## 2022-08-25 NOTE — PROGRESS NOTES
Subjective:       Patient ID: Ade Castellano is a 76 y.o. female.    Chief Complaint: Ear Fullness (Feeling full for a couple weeks, had ears flushed by nurse by still feels full)    HPI     Ade Castellano is a 76 y.o. female reports 2 week history of right > left hearing loss. Has h/o COPD, BrCa, lung ca and recent pneumonia.  Denies prior ear infections or tubes.       Review of Systems   Constitutional: Negative for fever.   HENT: Positive for ear pain and hearing loss. Negative for tinnitus.    Respiratory: Positive for cough and shortness of breath.    Neurological: Negative for dizziness.         Objective:      Physical Exam  Vitals and nursing note reviewed.   Constitutional:       Appearance: Normal appearance.   HENT:      Head: Normocephalic and atraumatic.      Right Ear: Ear canal and external ear normal. A middle ear effusion is present. There is no impacted cerumen.      Left Ear: Ear canal and external ear normal. A middle ear effusion is present. There is no impacted cerumen.   Neurological:      Mental Status: She is alert.       Data Reviewed:  Audiological testing revealed a mild to moderately-severe mixed hearing loss for the right ear and a mild to moderate mixed hearing loss for the left ear.  A speech reception threshold was obtained at 45 dBHL for the right ear and at 35 dBHL for the left ear.  Speech discrimination was 100% for the right ear and 100% for the left ear.       Tympanometry testing revealed a Type B (normal ECV) tympanogram for the right ear and a Type B (normal ECV) tympanogram for the left ear.  Ipsilateral acoustic reflexes were absent for the right ear and were absent for the left ear.       Assessment:       Problem List Items Addressed This Visit    None     Visit Diagnoses     Mixed hearing loss, bilateral    -  Primary    Middle ear effusion, bilateral              Plan:         recommend nasal steroid and insufflation   repeat audio 1 mo, if no resolution will  need BMT

## 2022-08-30 ENCOUNTER — TELEPHONE (OUTPATIENT)
Dept: HEMATOLOGY/ONCOLOGY | Facility: CLINIC | Age: 77
End: 2022-08-30
Payer: MEDICARE

## 2022-08-31 DIAGNOSIS — J22 ACUTE RESPIRATORY INFECTION: ICD-10-CM

## 2022-08-31 RX ORDER — BENZONATATE 100 MG/1
100 CAPSULE ORAL EVERY 6 HOURS PRN
Qty: 30 CAPSULE | Refills: 1 | Status: SHIPPED | OUTPATIENT
Start: 2022-08-31 | End: 2022-10-17

## 2022-09-06 ENCOUNTER — TELEPHONE (OUTPATIENT)
Dept: HEMATOLOGY/ONCOLOGY | Facility: CLINIC | Age: 77
End: 2022-09-06
Payer: MEDICARE

## 2022-09-12 ENCOUNTER — TELEPHONE (OUTPATIENT)
Dept: FAMILY MEDICINE | Facility: CLINIC | Age: 77
End: 2022-09-12
Payer: MEDICARE

## 2022-09-12 NOTE — TELEPHONE ENCOUNTER
----- Message from Sharona Campos sent at 9/12/2022  8:15 AM CDT -----  Regarding: self [phone]  Type:  Sooner Appointment Request    Patient is requesting a sooner appointment.  Patient declined first available appointment listed as well as another facility and provider .  Patient will not accept being placed on the waitlist and is requesting a message be sent to doctor.    Name of Caller: self    When is the first available appointment? 9/14    Symptoms: possible sinus infection and she would like to be seen this week in the morning. (Not today)    Would the patient rather a call back or a response via [facility]? Call back    Best Call Back Number:  [phone]

## 2022-09-12 NOTE — TELEPHONE ENCOUNTER
"Return call to patient, and she asked for an appointment in the morning with . Appointment on 9- given, pt refused. Patient asked if she would like to see the NP for a sooner opening. Patient states, "Yes, in the morning". Patient given appointment for 9- in the morning for 9am. Patient states, "Not until Friday". I informed patient that she asked for a morning appointment and that is the first available. That if she would like to be seen in the evening time that I can get her scheduled sooner. Patient states that she will just go to the hospital.   "

## 2022-09-15 ENCOUNTER — TELEPHONE (OUTPATIENT)
Dept: OTOLARYNGOLOGY | Facility: CLINIC | Age: 77
End: 2022-09-15
Payer: MEDICARE

## 2022-09-15 NOTE — TELEPHONE ENCOUNTER
----- Message from Oneil Brownlee sent at 9/15/2022  8:27 AM CDT -----  Name of Who is Calling: CHOLO HARRIS [[id number]]           What is the request in detail: Patient is requesting a call back to schedule an appointment for 09/28/22.  Patient needs to be seen for nose and throat closing.  Please assist.           Can the clinic reply by [facility]: NO           What Number to [facility] Back [facility]: [phone]

## 2022-09-26 ENCOUNTER — INFUSION (OUTPATIENT)
Dept: INFUSION THERAPY | Facility: HOSPITAL | Age: 77
End: 2022-09-26
Attending: INTERNAL MEDICINE
Payer: MEDICARE

## 2022-09-26 ENCOUNTER — TELEPHONE (OUTPATIENT)
Dept: HEMATOLOGY/ONCOLOGY | Facility: CLINIC | Age: 77
End: 2022-09-26

## 2022-09-26 ENCOUNTER — OFFICE VISIT (OUTPATIENT)
Dept: HEMATOLOGY/ONCOLOGY | Facility: CLINIC | Age: 77
End: 2022-09-26
Payer: MEDICARE

## 2022-09-26 ENCOUNTER — LAB VISIT (OUTPATIENT)
Dept: LAB | Facility: HOSPITAL | Age: 77
End: 2022-09-26
Attending: INTERNAL MEDICINE
Payer: MEDICARE

## 2022-09-26 VITALS
HEIGHT: 63 IN | TEMPERATURE: 98 F | WEIGHT: 157.19 LBS | HEART RATE: 50 BPM | DIASTOLIC BLOOD PRESSURE: 71 MMHG | SYSTOLIC BLOOD PRESSURE: 129 MMHG | OXYGEN SATURATION: 99 % | BODY MASS INDEX: 27.85 KG/M2

## 2022-09-26 VITALS
SYSTOLIC BLOOD PRESSURE: 117 MMHG | DIASTOLIC BLOOD PRESSURE: 61 MMHG | TEMPERATURE: 98 F | OXYGEN SATURATION: 98 % | HEART RATE: 50 BPM | RESPIRATION RATE: 16 BRPM

## 2022-09-26 DIAGNOSIS — C50.111 MALIGNANT NEOPLASM OF CENTRAL PORTION OF RIGHT BREAST IN FEMALE, ESTROGEN RECEPTOR NEGATIVE: ICD-10-CM

## 2022-09-26 DIAGNOSIS — C50.919 RECURRENT BREAST CANCER, UNSPECIFIED LATERALITY: Primary | ICD-10-CM

## 2022-09-26 DIAGNOSIS — Z17.1 MALIGNANT NEOPLASM OF CENTRAL PORTION OF RIGHT BREAST IN FEMALE, ESTROGEN RECEPTOR NEGATIVE: ICD-10-CM

## 2022-09-26 DIAGNOSIS — J44.9 CHRONIC OBSTRUCTIVE PULMONARY DISEASE, UNSPECIFIED COPD TYPE: ICD-10-CM

## 2022-09-26 DIAGNOSIS — C34.90 SQUAMOUS CELL CARCINOMA LUNG: Primary | ICD-10-CM

## 2022-09-26 DIAGNOSIS — E03.2 HYPOTHYROIDISM DUE TO DRUGS: ICD-10-CM

## 2022-09-26 DIAGNOSIS — C34.91 SQUAMOUS CELL CARCINOMA OF RIGHT LUNG: ICD-10-CM

## 2022-09-26 DIAGNOSIS — R94.6 ABNORMAL RESULTS OF THYROID FUNCTION STUDIES: ICD-10-CM

## 2022-09-26 DIAGNOSIS — C50.919 METASTATIC BREAST CANCER: ICD-10-CM

## 2022-09-26 DIAGNOSIS — Z85.118 HISTORY OF LUNG CANCER: ICD-10-CM

## 2022-09-26 LAB
ALBUMIN SERPL BCP-MCNC: 3.7 G/DL (ref 3.5–5.2)
ALP SERPL-CCNC: 51 U/L (ref 55–135)
ALT SERPL W/O P-5'-P-CCNC: 14 U/L (ref 10–44)
ANION GAP SERPL CALC-SCNC: 10 MMOL/L (ref 8–16)
AST SERPL-CCNC: 15 U/L (ref 10–40)
BILIRUB SERPL-MCNC: 0.4 MG/DL (ref 0.1–1)
BUN SERPL-MCNC: 18 MG/DL (ref 8–23)
CALCIUM SERPL-MCNC: 9.3 MG/DL (ref 8.7–10.5)
CHLORIDE SERPL-SCNC: 108 MMOL/L (ref 95–110)
CO2 SERPL-SCNC: 22 MMOL/L (ref 23–29)
CREAT SERPL-MCNC: 1 MG/DL (ref 0.5–1.4)
ERYTHROCYTE [DISTWIDTH] IN BLOOD BY AUTOMATED COUNT: 14.4 % (ref 11.5–14.5)
EST. GFR  (NO RACE VARIABLE): 59 ML/MIN/1.73 M^2
GLUCOSE SERPL-MCNC: 123 MG/DL (ref 70–110)
HCT VFR BLD AUTO: 40.5 % (ref 37–48.5)
HGB BLD-MCNC: 13.3 G/DL (ref 12–16)
IMM GRANULOCYTES # BLD AUTO: 0.03 K/UL (ref 0–0.04)
MAGNESIUM SERPL-MCNC: 1.6 MG/DL (ref 1.6–2.6)
MCH RBC QN AUTO: 29.8 PG (ref 27–31)
MCHC RBC AUTO-ENTMCNC: 32.8 G/DL (ref 32–36)
MCV RBC AUTO: 91 FL (ref 82–98)
NEUTROPHILS # BLD AUTO: 2.9 K/UL (ref 1.8–7.7)
PLATELET # BLD AUTO: 219 K/UL (ref 150–450)
PMV BLD AUTO: 9.1 FL (ref 9.2–12.9)
POTASSIUM SERPL-SCNC: 3.4 MMOL/L (ref 3.5–5.1)
PROT SERPL-MCNC: 6.8 G/DL (ref 6–8.4)
RBC # BLD AUTO: 4.46 M/UL (ref 4–5.4)
SODIUM SERPL-SCNC: 140 MMOL/L (ref 136–145)
TSH SERPL DL<=0.005 MIU/L-ACNC: 3.04 UIU/ML (ref 0.4–4)
WBC # BLD AUTO: 4.53 K/UL (ref 3.9–12.7)

## 2022-09-26 PROCEDURE — 99214 PR OFFICE/OUTPT VISIT, EST, LEVL IV, 30-39 MIN: ICD-10-PCS | Mod: S$GLB,,, | Performed by: INTERNAL MEDICINE

## 2022-09-26 PROCEDURE — 3288F FALL RISK ASSESSMENT DOCD: CPT | Mod: CPTII,S$GLB,, | Performed by: INTERNAL MEDICINE

## 2022-09-26 PROCEDURE — 1157F ADVNC CARE PLAN IN RCRD: CPT | Mod: CPTII,S$GLB,, | Performed by: INTERNAL MEDICINE

## 2022-09-26 PROCEDURE — 99499 RISK ADDL DX/OHS AUDIT: ICD-10-PCS | Mod: HCNC,S$GLB,, | Performed by: INTERNAL MEDICINE

## 2022-09-26 PROCEDURE — 1159F PR MEDICATION LIST DOCUMENTED IN MEDICAL RECORD: ICD-10-PCS | Mod: CPTII,S$GLB,, | Performed by: INTERNAL MEDICINE

## 2022-09-26 PROCEDURE — 3044F HG A1C LEVEL LT 7.0%: CPT | Mod: CPTII,S$GLB,, | Performed by: INTERNAL MEDICINE

## 2022-09-26 PROCEDURE — 1159F MED LIST DOCD IN RCRD: CPT | Mod: CPTII,S$GLB,, | Performed by: INTERNAL MEDICINE

## 2022-09-26 PROCEDURE — 1157F PR ADVANCE CARE PLAN OR EQUIV PRESENT IN MEDICAL RECORD: ICD-10-PCS | Mod: CPTII,S$GLB,, | Performed by: INTERNAL MEDICINE

## 2022-09-26 PROCEDURE — 63600175 PHARM REV CODE 636 W HCPCS: Performed by: INTERNAL MEDICINE

## 2022-09-26 PROCEDURE — 3288F PR FALLS RISK ASSESSMENT DOCUMENTED: ICD-10-PCS | Mod: CPTII,S$GLB,, | Performed by: INTERNAL MEDICINE

## 2022-09-26 PROCEDURE — A4216 STERILE WATER/SALINE, 10 ML: HCPCS | Performed by: INTERNAL MEDICINE

## 2022-09-26 PROCEDURE — 80053 COMPREHEN METABOLIC PANEL: CPT | Performed by: INTERNAL MEDICINE

## 2022-09-26 PROCEDURE — 36415 COLL VENOUS BLD VENIPUNCTURE: CPT | Performed by: INTERNAL MEDICINE

## 2022-09-26 PROCEDURE — 85027 COMPLETE CBC AUTOMATED: CPT | Performed by: INTERNAL MEDICINE

## 2022-09-26 PROCEDURE — 99999 PR PBB SHADOW E&M-EST. PATIENT-LVL IV: ICD-10-PCS | Mod: PBBFAC,,, | Performed by: INTERNAL MEDICINE

## 2022-09-26 PROCEDURE — 25000003 PHARM REV CODE 250: Performed by: INTERNAL MEDICINE

## 2022-09-26 PROCEDURE — 99214 OFFICE O/P EST MOD 30 MIN: CPT | Mod: S$GLB,,, | Performed by: INTERNAL MEDICINE

## 2022-09-26 PROCEDURE — 3074F SYST BP LT 130 MM HG: CPT | Mod: CPTII,S$GLB,, | Performed by: INTERNAL MEDICINE

## 2022-09-26 PROCEDURE — 1126F AMNT PAIN NOTED NONE PRSNT: CPT | Mod: CPTII,S$GLB,, | Performed by: INTERNAL MEDICINE

## 2022-09-26 PROCEDURE — 96523 IRRIG DRUG DELIVERY DEVICE: CPT

## 2022-09-26 PROCEDURE — 1101F PR PT FALLS ASSESS DOC 0-1 FALLS W/OUT INJ PAST YR: ICD-10-PCS | Mod: CPTII,S$GLB,, | Performed by: INTERNAL MEDICINE

## 2022-09-26 PROCEDURE — 84443 ASSAY THYROID STIM HORMONE: CPT | Performed by: INTERNAL MEDICINE

## 2022-09-26 PROCEDURE — 99999 PR PBB SHADOW E&M-EST. PATIENT-LVL IV: CPT | Mod: PBBFAC,,, | Performed by: INTERNAL MEDICINE

## 2022-09-26 PROCEDURE — 3078F DIAST BP <80 MM HG: CPT | Mod: CPTII,S$GLB,, | Performed by: INTERNAL MEDICINE

## 2022-09-26 PROCEDURE — 83735 ASSAY OF MAGNESIUM: CPT | Performed by: INTERNAL MEDICINE

## 2022-09-26 PROCEDURE — 1126F PR PAIN SEVERITY QUANTIFIED, NO PAIN PRESENT: ICD-10-PCS | Mod: CPTII,S$GLB,, | Performed by: INTERNAL MEDICINE

## 2022-09-26 PROCEDURE — 3074F PR MOST RECENT SYSTOLIC BLOOD PRESSURE < 130 MM HG: ICD-10-PCS | Mod: CPTII,S$GLB,, | Performed by: INTERNAL MEDICINE

## 2022-09-26 PROCEDURE — 1101F PT FALLS ASSESS-DOCD LE1/YR: CPT | Mod: CPTII,S$GLB,, | Performed by: INTERNAL MEDICINE

## 2022-09-26 PROCEDURE — 3078F PR MOST RECENT DIASTOLIC BLOOD PRESSURE < 80 MM HG: ICD-10-PCS | Mod: CPTII,S$GLB,, | Performed by: INTERNAL MEDICINE

## 2022-09-26 PROCEDURE — 99499 UNLISTED E&M SERVICE: CPT | Mod: HCNC,S$GLB,, | Performed by: INTERNAL MEDICINE

## 2022-09-26 PROCEDURE — 3044F PR MOST RECENT HEMOGLOBIN A1C LEVEL <7.0%: ICD-10-PCS | Mod: CPTII,S$GLB,, | Performed by: INTERNAL MEDICINE

## 2022-09-26 RX ORDER — SODIUM CHLORIDE 0.9 % (FLUSH) 0.9 %
10 SYRINGE (ML) INJECTION
Status: DISCONTINUED | OUTPATIENT
Start: 2022-09-26 | End: 2022-09-26 | Stop reason: HOSPADM

## 2022-09-26 RX ORDER — HEPARIN 100 UNIT/ML
500 SYRINGE INTRAVENOUS
Status: CANCELLED | OUTPATIENT
Start: 2022-09-26

## 2022-09-26 RX ORDER — HEPARIN 100 UNIT/ML
500 SYRINGE INTRAVENOUS
Status: DISCONTINUED | OUTPATIENT
Start: 2022-09-26 | End: 2022-09-26 | Stop reason: HOSPADM

## 2022-09-26 RX ORDER — SODIUM CHLORIDE 0.9 % (FLUSH) 0.9 %
10 SYRINGE (ML) INJECTION
Status: CANCELLED | OUTPATIENT
Start: 2022-09-26

## 2022-09-26 RX ADMIN — Medication 10 ML: at 08:09

## 2022-09-26 RX ADMIN — HEPARIN 500 UNITS: 100 SYRINGE at 08:09

## 2022-09-26 NOTE — TELEPHONE ENCOUNTER
"----- Message from Shakira Adams RN sent at 9/26/2022  2:36 PM CDT -----    ----- Message -----  From: Erasmo Estrada  Sent: 9/26/2022   1:18 PM CDT  To: Mg Mehta Staff    Reschedule Existing Appointment        Appt Date: 10/24; 11/7    Type of appt: Infusion; [facility]    Physician: Mg    Reason for rescheduling? Requesting to r/s both for 11/9    Caller: Self    Contact Preference: [phone]             Additional Information:  "Thank you for all that you do for our patients"       "

## 2022-09-26 NOTE — PLAN OF CARE
Pt received maintenance PAC flush. Reports she has been feeling better since being taken off her tx plan. States her breathing and coughing has been improving. VSS. PAC flushed and blood return noted. Pt sees  later this morning for 920. Discharged from unit in [facility].

## 2022-09-26 NOTE — PROGRESS NOTES
Subjective:       Patient ID: Ade Castellano is a 76 y.o. female.    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 The PET(+) disease in the right axilla and right supraclavicular fossa will be boosted at 200 cGy per fraction to 6000 cGy total dose.  S/p LYMPH NODE, RIGHT AXILLA, BIOPSY: 6/16/21 . Metastatic poorly-differentiated carcinoma, c/w breast primary ERnegPRneg Her2 neg  PD-L1 (22C3) IHC CPS> is greater than or equal to 10  Pembrolizumab 7/22/21 -present        Prior Hx:  75 y/o female with history of  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  And Rt  breast Haja/p  cycle 6 of carboplatin/Abraxane 7/2/2018   She has  History of Stage 1A  Rt breast cancer Grade 3, ER/MT neg , Her 2 jose maria neg s/p lumpectomy 7/11/2014 and s/p adjuvant RT 9/2014 Pt declined Adjuvant chemo.Pathology showed a 1.15 cm, grade 3 infiltrating ductal carcinoma.  One sentinel lymph node was negative for malignancy.  Margins were negative and the closest margin was 1 cm.  She was staged  pT1c  pN0 cM0 stage IA.  She declined adjuvant chemo therapy.  She completed post operative radiation therapy at 400 cGy per fraction to 4000 cGy 9/2014  Pt hospitalized 11/2017 for  acute on chronic respiratory failure requiring intubation and ventilation. Has large lung mass in right lung with probable post obstructive pneumonia resulting in COPD  exacerbation.CTA chest 11/29/2017 revealed   Large right hilar mass with involvement of adjacent segmental pulmonary arterial branches and bronchi with associated postobstructive atelectasis and volume loss in the RML  with adjacent ground glass opacity concerning for underlying neoplastic process. Mediastinal and axillary adenopathy, with a right axillary lymph node measuring up to 2.0 cm in short axis diameter.She underwent rt axillary LN bx at outside facility- on 1/16/2018 at [facility]. Pathology revealed metastatic poorly differentiated carcinoma of unknown primary site. She next underwent lung bx at [facility] 1/31/2018  benign lung tissue. Repeat Right Lung Bx 2/14/2018 Pathology reveals Squamous cell carcinoma PD-L1 10% low expression EGFR NEG ALK NEG BRAF NEG. Outside slides axillary LN specimen were reviewed/comparison to lung bx findings . She completed    cycle 6 of carboplatin/Abraxane completed 7/2018 .PET/CT  4/19/2018 revealed there has been a excellent response to therapy.  At least 90% reduction in malignant activity.PET/CT 2/21/2019 increased size and irregularity of the right axillary lymph node with increased FDG avidityMAMMO/US rt breast 2/2019 revealed suspicious findings rt axilla LN.  Right axilla, mass, core biopsy: Positive for poorly differentiated carcinoma breast primary ER neg/[place] neg Her2 neg.[facility] reviewIt was determined  the lung tumor corresponds to a squamous cellcarcinoma and appears to be unrelated to the invasive ductal carcinoma of the breast. The axillary mass, in myopinion, corresponds to metastases of the breast primary. Although it is a poorly differentiated carcinoma, theimmunophenotype is most consistent with the one that the breast primary exhibited, and is inconsistent with metastatic squamous cell carcinoma. She was treated with  AC ( adriamycin 60m/m2/Cytoxan 600mg/m2)  q21d x 4 cycles completed 9/6/2019 . PET/CT 9/19/2019 shows disease response  l decrease in size and uptake of several right axillary lymph nodes and a supraclavicular lymph node.  Mild residual activity may indicate viable tumor.Pt followed by Rad/Onc. She was presented at [facility] tumor board and it was determined to proceed Radiation therapy only. No ALND due to concurrent lung and supraclavicular node. She  completed  RT 12/16/2019  To right axilla and right supraclavicular region PET/CT imaging  5/20/21 shows Continued increase in both size and hypermetabolic activity of right axillary level 1 lymph nodes a new, mildly hypermetabolic cutaneous thickening of the right breast. she underwent LYMPH NODE, RIGHT AXILLA, BIOPSY: 6/16/21 . Pathology showed Metastatic poorly-differentiated carcinoma, consistent with breast primary-ERneg/ PRneg  HER2  neg  Pt started on pembrolizumab 7/2021  Pt hospitalized 4/6/22 with hypokalemia and orthostatic hypotension        Interval Hx:S/p C16 pembrolizumab 6/15/22  ( 400mg q6wks)  Pt treated with course of abx last f/u   Cough improved  Chronic LOGAN-stabe  No fevers  No fatigue  She is able to cut grass with riding lawnmower  Appetite improved  She has supp 02 at home  Pox 99% RA 02 sats today    PET /CT 7/13/22 shows  new left lower lobe opacification with mild radiotracer uptake which may represent aspiration, pneumonia, or malignancy.  Tissue sampling would be required for definitive diagnosis.    She is followed by Dr. Ktaz, pulmonology    She also has been followed by GI for dysphagia  In past  She has undergone dilation      Last Mammo 6/16/21  No mammographic evidence of malignancy.BI-RADS Category 1: Negative      PET/CT 1/26/22 No definite evidence of hypermetabolic tumor.  Interval resolution of hypermetabolic right axillary lymph nodes.  No new hypermetabolic findings.        PrevHx:She had an abnormal mammogram 6/4/2014 whichRevealed a round solid mass 6mm in rt breast.She then underwent U/S guided core bx of rt breast mass on  "6/17/2014.Pathology revealed infiltrating ductal carcinoma Grade 3 with tumorPresent in thin-walled spaces suggestive of lymphatic spaces. HormoneReceptor status on tumor specimen revealed ER negative 0% ,KS negative 0% and Her 2 jose maria negative.She subsequently underwent rt segmental mastectomy and SLN bxOn 7/11/2014. Pathology of rt breast lumpectomy revealed invasiveDuctal carcinoma with micropapillary pattern ( Invasive micropapillaryCa) with max tumor dimension 11.5mm with suggestion of tumor in Thin walled spaces c/w lymphovascular involvement. SurgicalMargins free of tumor, grade 3, ER/[place] neg , Her 2 jose maria neg With [place] Lymph node negative for Neoplasm. Pathologic staging [id number](i-)Her mother was diagnosed with breast cancer in her 50's/        Review of Systems   Constitutional: Positive for appetite change ( improved). No fatigue, activity change,  fever.   HENT: Negative for mouth sores, rhinorrhea and trouble swallowing.    Eyes: Negative for visual disturbance.   Respiratory: Positive for cough (improved ) and shortness of breath (improved). Negative for wheezing.    Cardiovascular: Negative for chest pain.   Gastrointestinal: Negative for abdominal pain and diarrhea.   Genitourinary: Negative for frequency.   Musculoskeletal: Negative for back pain.   Skin: Negative for rash.   Neurological: Negative for dizziness and headaches.   Hematological: Negative for adenopathy.   Psychiatric/Behavioral: The patient is not nervous/anxious.        Objective:          Vitals:    09/26/22 0925   BP: 129/71   BP Location: Left arm   Patient Position: Sitting   BP Method: Large (Automatic)   Pulse: (!) 50   Temp: 97.6 °F (36.4 °C)   TempSrc: Oral   SpO2: 99%   Weight: 71.3 kg (157 lb 3 oz)   Height: 5' 3" (1.6 m)         Physical Exam   Constitutional: She is oriented to person, place, and time. She appears ill, coughing episodes  HENT:   Head: Normocephalic.   Eyes: Conjunctivae and lids are normal.No scleral " icterus.   Neck: Normal range of motion. Neck supple. No thyromegaly present.   Cardiovascular: Normal rate, regular rhythm and normal heart sounds.    No murmur heard.  Pulmonary/Chest: Breath sounds normal. She has no wheezes. She has no rales.   Abdominal: Soft. Bowel sounds are normal.  There is no tenderness. There is no rebound and no guarding.   Left breast- no masses or axillary LAD noted  Right breast- breast thickening inner quad    She has no cervical adenopathy.     She has rt axillary adenopathy.        Right: No supraclavicular adenopathy present.        Left: No supraclavicular adenopathy present.   Neurological: She is alert and oriented to person, place, and time. No cranial nerve deficit. Coordination normal.   Skin: Skin is warm and dry. No ecchymosis, no petechiae and no rash noted. No erythema.   Psychiatric: She has a normal mood and affect.             CT a/p w/contrast 11/29/2018   1. No acute abnormality identified within the abdomen and pelvis.    2.  There are a few nonspecific prominent lymph nodes in the upper abdomen including a periesophageal lymph node which measures 1.0 cm in short axis diameter.    3.  Significant abdominal aortic atherosclerosis and abdominal aorta ectasia.    4.  Additional findings as above.    PET/CT 1/26/2018   1.  Intense FDG uptake within the known right infrahilar lung mass, compatible with malignancy.  It is unclear if this represents primary lung malignancy or metastatic breast cancer.    2.  Intensely hypermetabolic right axillary, retropectoral, and lower right cervical lymph nodes, compatible with metastases.    3.  Abnormal FDG uptake along right breast skin thickening, new from prior chest CTA and mammogram.  This may relate to localized edema/inflammation, though correlation with physical exam and mammography are recommended to exclude underlying inflammatory carcinoma.      MRI Brain w w/out contrast 2/9/2018  1.  No evidence of intracranial  metastases.  2.  Sinus disease       Rt axillary LN bx at outside facility- on 1/16/2018 at [facility]  Pathology revealed metastatic poorly differentiated carcinoma of unknown primary  site 1/16/2018 at [facility]  TTF-1 negative, napsin negative  , cytokeratin 7 positive, cytokeratin 20 negative, p63 negative, cytokeratin 5/6 focal dim positivity    LUNG BIOPSY 1/31/2018  Pathology revealed benign lung tissue         SPECIMEN  1) Right Lung Bx 2/14/2018  Supplemental Diagnosis  Immunohistochemical stains show strong nuclear staining for p63 in essentially all tumor cells and very strong  cytoplasmic and membrane staining for CK5/6, also in essentially all tumor cells. TTF-1 and CK7 are negative  within tumor cells but do stain native pulmonary elements present within the biopsy. A stain for mucicarmine is  negative. Positive and negative controls function appropriately.  Final diagnosis: Specimen submitted as right lung biopsy  -Squamous cell carcinoma  (Electronically Signed: 2018-02-20 09:12:07 )  Diagnosed by: Phong Thompson  FINAL PATHOLOGIC DIAGNOSIS  Fragments of pulmonary parenchyma (submitted as right lung biopsy):    FINAL PATHOLOGIC DIAGNOSIS 1. Lymph node, right axilla, biopsy, review of 10 outside slides [facility], JS [phone],  collected January 11, 2018: Metastatic poorly differentiated carcinoma (see comment).  The histologic section shows fibrous stroma infiltrated by nest of poorly differentiated malignant cells including  occasional dyskeratotic cells morphologically suggestive of metastatic squamous cell carcinoma.  Immunohistochemical stains are nonspecific; the cells are positive for cytokeratin 7 and cytokeratin 5/6 (focal) and  negative for cytokeratin 20, TTF-1, p63, GCDFP, mammoglobin, and CEA        PET/CT 2/21/2019  Increased size and irregularity of the right axillary lymph node with increased FDG avidity.  Although this would be atypical  in location for lung carcinoma, the increased size and avidity must be concerning for metastatic disease in this patient with history of squamous cell lung cancer.     US Rt breast and mammo 2/26/2019  Impression:  Right  Lymph Node: Right axilla lymph node. Assessment: 4 - Suspicious finding. Biopsy is recommended.      BI-RADS Category:   Right: 4 - Suspicious  Overall: 4 - Suspicious     Recommendation:  Biopsy is recommended. Biopsy of the lymph node measuring 1.4 x 1.1 x 1.3 recommended , the one with the round shape configuration, corresponding to the most recent PET CT finding.         Pathology 3/11/2019   FINAL PATHOLOGIC DIAGNOSIS  Right axilla, mass, core biopsy:  Positive for poorly differentiated carcinoma.  See comment.  Comment:  The biopsy from the right axilla mass shows a poorly differentiated carcinoma in background lymphoid tissue  with enlarged cells showing increased nuclear size, prominent nucleoli, moderate amounts of cytoplasm and mitotic  figures. Immunostains are completed and reveal the tumor cells to stain positively with cytokeratin AE 1/AE3, CK  5/6 and CK 7. The tumor cells are negative for CK 20, Estrogen receptor, p63 and TTF-1. All stains have  satisfactory positive and negative controls. The patient's prior cases will be reviewed and an additional stain for  JUAREZ-3 is pending in an attempt to pinpoint the primary site of this malignancy and results will follow in a  supplemental report.      Supplemental Diagnosis  3/11/2019  The current axillary mass is compared to the patient's prior right lung biopsy (case number [id number]) and the  current axillary mass is morphologically similar to the lung malignancy. Also, the current axillary mass is compared  to the patient's prior breast resection (Case number [id number]) and appears similar as well. P63 is negative in the  [id number] tumor and CK 5/6 shows scattered positive staining in the [id number] tumor.  Immunostain for JUAREZ-3 is  completed and shows only rare weak to moderate staining within the tumor cell nuclei  with satisfactory positive and negative controls. This stain is positive in the surrounding lymphocytes. This staining  pattern is non-specific and does not definitively differentiate between a lung and breast primary malignancy in this  right axilla mass biopsy. The staining profile of the current axillary mass match more closely with the previous  breast carcinoma (due to the p63 negativity in both the 2014 breast cancer and the current axillary mass lesion but  p63 positivity in the lung mass from 2018).  This staining profile, together with the comparison with the patient's prior breast tumor and prior lung tumor supports  a diagnosis of poorly differentiated carcinoma and the malignancy appears morphologically similar to the prior  malignancies from both sites, but the staining profile in this small sample from the axillary mass more closely  correlates with the prior breast malignancy. Pathologic subclassification of this malignancy's primary location is not  definitive and clinical correlation is recommended definitively decide on possible primary location in this patient's  axillary mass sample.  Supplemental (2):  Additional immunohistochemical staining for progesterone receptor and HER2 are completed per clinician request  with following results:  Progesterone receptor: Negative; 0% nuclear tumor cell staining.  HER2: Negative; stain score = 0.  Supplemental (3):  [place] DIAGNOSIS:  FINAL DIAGNOSIS  Breast, right, needle core biopsy ([id number]; 06/17/2014): Invasive ductal carcinoma, [place] grade III (of  III), with focal micropapillary features.  Immunohistochemical stains performed at the referring institution show that the tumor cells have the following  phenotype: - Estrogen receptor: Negative (0% tumor cells staining). - Progesterone receptor: Negative (0% tumor  cells staining). - HER2: Negative (score 0).  Lymph  nodes, right, sentinel biopsy and lumpectomy ([id number]; 07/11/2014):  1. Right sentinel lymph node: A single (1) lymph node is negative for metastatic carcinoma.  Immunohistochemical stains performed by the referring institution and reviewed at [facility] for cytokeratin are  negative, confirming the diagnosis.  2. Right breast: Invasive ductal carcinoma with micropapillary features, per report 11.5 mm in greatest dimension.  Foci suspicious for lymphovascular invasion identified. Margins of resection are free of tumor.  Immunohistochemical stains performed by the referring institution and reviewed at [facility] show that the tumor  cells are negative for p63 and show patchy positivity for CK 5/6.  Lung, right, needle core biopsy ([id number]; 02/14/2018): Squamous cell carcinoma.    Immunohistochemical stains performed by the referring institution show the tumor cells are strongly positive for p63  and CK 5/6, while negative for TTF-1 and CK7. These result support the diagnosis. Axilla, right, needle core biopsy  ([id number]; 03/11/2019): Lymph node positive for metastatic carcinoma, most consistent with breast primary  (see comment).  Immunohistochemical stains performed by the referring institution and reviewed at [facility] show that the tumor  cells are negative for p63, focally positive for CK 5/6, focally and weakly positive for JUAREZ-3, and strongly positive  for CK7. They are also negative for estrogen receptor, progesterone receptor, and HER2 JAM (score 0).  COMMENT:  Thank you for allowing me to review the case of this 73-year-old lady who recently underwent needle core biopsies  of a right axillary mass and who has a previous diagnosis of an invasive ductal carcinoma of the right breast, as well  as a squamous cell carcinoma of the lung. I am reviewing this case because of my special interest in breast  pathology.  I agree with your interpretation of this case. In my opinion, the lung tumor  corresponds to a squamous cell  carcinoma and appears to be unrelated to the invasive ductal carcinoma of the breast. The axillary mass, in my  opinion, corresponds to metastases of the breast primary. Although it is a poorly differentiated carcinoma, the  immunophenotype is most consistent with the one that the breast primary exhibited, and is inconsistent with a  metastatic squamous cell carcinoma. I did share the case with Dr. Anabel Stone, one of our pulmonary pathologists,  and she concurs with this interpretation.  Note: Report attached.  Performing location:  [address] [address]      LYMPH NODE, RIGHT AXILLA, BIOPSY: 6/16/21   - Metastatic poorly-differentiated carcinoma, consistent with breast primary  -ER, HI, and HER2 immunohistochemical hormonal markers are also negative  PD-L1 (22C3) SemiQuant IHC, Manual  Interpretation  Right axillary lymph node , specimen for PD-L1 immunohistochemistry studies (clone 22C3, [employer], [place], [place]; using a proprietary detection system) ([id number]):  Reported carcinoma site of origin: Breast  Result: The percent of PD-L1 positive cells based upon the total number of tumor cells (combined positive  score, CPS) is greater than or equal to 10.  Interpretation: Studies suggest that positive PD-L1 immunohistochemistry in tumor cells and/or tumorassociated  immune cells may predict tumor response to therapy with immune checkpoint inhibitors. This  result should not be used as the sole factor in determining treatment, as other factors (for example, tumor  mutation burden, and microsatellite instability) have been also studied as predictive markers.          CT neck/chest/abd/pelvis w/contrast 4/26/2019   The previously described right hilar mass is no longer visible.  There is persistent volume loss in the right middle lobe this appears similar to the prior PET-CT February 21,  2019.    Persistent abnormal right axillary node today measuring about 15 mm compared 18 mm prior.  The positioning of the adjacent nodes is slightly different likely accounting for the slight difference in measurement.    Cluster of tree-in-bud nodular densities left lower lobe series 2, image 93.  This may be related to small airways disease though serial follow-up suggested.      PET/CT 6/20/2019   New and worsening hypermetabolic lymph nodes concerning for metastatic breast cancer as described above.  Tissue sampling would be required for definitive diagnosis.      2-D echo 6/27/2019   Normal left ventricular systolic function. The estimated ejection fraction is 55%  Concentric left ventricular remodeling.  Normal LV diastolic function.  Normal right ventricular systolic function.  Moderate left atrial enlargement.  Mild right atrial enlargement.  Mild aortic regurgitation.  Mild mitral regurgitation.  Mild tricuspid regurgitation.  Normal central venous pressure (3 mm Hg).  The estimated PA systolic pressure is 25 mm Hg      PET/CT 9/19/2019   Favorable response to therapy with interval decrease in size and uptake of several right axillary lymph nodes and a supraclavicular lymph node.  Mild residual activity may indicate viable tumor.    No new hypermetabolic findings worrisome for malignancy.    PET/CT 2/28/2020  1.  No evidence of hypermetabolic tumor.  Continued improvement of the right axilla status post adjuvant radiation.    2.  No new hypermetabolic lesions      CTA 6/17/2020  1. No evidence of pulmonary embolus. No aortic dissection.   2. There is no evidence for new or progressive disease in the chest as compared to PET/CT 6/20/2019 in this patient with history of right breast cancer and right lung neoplasm. The patient does have a more recent PET/CT 2/28/2020 from an outside facility per the electronic medical record although images are not available for direct comparison. Correlation with these  images may be beneficial. Continued long-term surveillance recommended.  3. Stable appearance of the treated tumor in the right hilum/middle lobe.  4. Stable treated right breast cancer and axillary adenopathy without evidence for developing breast mass or new right axillary adenopathy.  5. Stable tiny nodularity/tree-in-bud densities in the left lung, probably postinfectious or inflammatory.  6. Wall thickening of the esophagus may be correlated for esophagitis. Possible tiny hiatus hernia.  7. Slight bronchial wall thickening may be correlated for bronchitis.  8. Mild to moderate emphysema as before.  9. Reflux of contrast into the IVC and hepatic veins is nonspecific but may be correlated for elevated right heart pressures.  10. Coronary calcifications and/or stents similar to prior.    Echo 5/21/2020  Technically difficult study.   Normal left ventricular systolic function.   Left ventricular ejection fraction is estimated at 55%.   Severe mitral annular calcification.   Mild mitral valve regurgitation.   Aortic valve sclerosis without stenosis or regurgitation.     PFTs 6/17/2020  Spirometry reveals a very severe obstructive lung defect, which does not significantly improve post bronchodilators. Lung volumes revealed air trapping. Diffusing capacity was moderately impaired.        Del 6/26/2020  BI-RADS Category:   Overall: 2 - Benign      PET/CT 10/23/2020   Impression:     Stable mildly hypermetabolic right axillary lymph node/nodular density contralateral to the site of injection.  Differential considerations include metastatic lymph node, reactive lymph node from benign right upper extremity process, and fat necrosis.  Recommend physical examination of the upper extremity and consideration of ultrasound for further evaluation of the node/nodule and possible image guided biopsy.  Otherwise, attention on follow-up.     Otherwise, no other hypermetabolic disease identified      Mammo diagnostic right /US  11/4/2020   Impression:   1. No suspicious finding either on mammogram or ultrasound at the site of the palpable lump in the right breast at 10:00 o'clock. A bilateral mammogram is recommended in June 2020 to return to the patient to annual screening.   2. Redemonstration of the morphologically abnormal lymph node in the right axilla that contains a biopsy clip, compatible with the known recurrence metastatic breast cancer that has been treated with chemotherapy. The appearance of this lymph node is unchanged from 06/26/2020 and overall appears smaller when compared to 03/11/2019.     Abd US 12/11/2020   Impression:     1. Echogenic liver likely representing hepatic steatosis.  2. Right upper quadrant ultrasound otherwise is unremarkable.     PET/CT 10/14/21     Impression:     1.  No definite evidence of hypermetabolic tumor.  Interval resolution of hypermetabolic right axillary lymph nodes.  No new hypermetabolic findings.     2.  Persistent low-grade diffuse cutaneous uptake which is nonspecific.    MRI shoulder w w/out contrast 1/26/22   Impression:     Mild edema and postcontrast enhancement at the myotendinous junction of the supraspinatus and infraspinatus, it is nonspecific and could be due to degenerative change or tendinosis.     Small area of interstitial tear at the footprint insertion of the infraspinatus tendon.     No large rotator cuff tear.     No advanced degenerative change.     No osseous lesions.     PET/CT 1/26/22  Impression:In this patient with breast cancer, there is no evidence of hypermetabolic tumor to suggest recurrent or metastatic disease.   Less extensive but, nonspecific, low-grade diffuse cutaneous uptake of the right breast.       PET/CT 4/27/22  Impression:     1.  No FDG avid disease to suggest recurrence or metastatic disease.     Unchanged right breast skin thickening with mild FDG uptake.       PET/CT 7/13/22   Impression:     In this patient with lung and breast cancer, there  is new left lower lobe opacification with mild radiotracer uptake which may represent aspiration, pneumonia, or malignancy.  Tissue sampling would be required for definitive diagnosis.     Unchanged right breast skin thickening with mild radiotracer uptake.    CT chest w/contrast 8/25/22    Decreasing patchy pulmonary consolidation within the left lower lobe when compared to prior PET-CT scan dated 07/13/2022.  The overall appearance of the patchy consolidation as well as the moderate improvement when compared to the prior study suggest a benign etiology such as left lower lobe pneumonia.     Persistent band of atelectasis/scarring within the right middle lobe, stable dating back to 04/26/2019.     Coronary artery atherosclerosis in a multi vessel distribution.     There are no measurable lesions per RECIST criteria.        Mammo 7/26/22  BI-RADS Category:   Overall: 2 - Benign    Assessment:       1. Recurrent breast cancer, unspecified laterality    2. History of lung cancer    3. Abnormal results of thyroid function studies    4. Chronic obstructive pulmonary disease, unspecified COPD type          Plan:     1.,2.   Pt with hx of Stage 1A invasive ductal carcinoma rt breast s/p rt segmental mastectomy and SLN bx 7/11/2014 ER/WA neg HER 2 jose maria neg [id number](i-).  S/p adjuvant RT completed 9/2014 Pt declined adjuvant chemo  Pt  hospitalized 11/2017 with acute-on-chronic  resp failure  Abnormal CT imaging revealing Large right hilar mass with involvement of  adjacent segmental pulmonary arterial branches and bronchi with associated postobstructive atelectasis  and involvement of mediastinal and axillary adenopathy   S/p rt axillary LN bx ( outside facility) - metastatic poorly differentiated carcinoma of unknown primary  site  status post  lung biopsy 1/31/2017 -benign lung tissue  PET/CT 1/26/2018   Intense FDG uptake within the known right infrahilar lung mass, compatible with malignancy.   Intensely hypermetabolic  right axillary, retropectoral, and lower right cervical lymph nodes, compatible with metastases.  Abnormal FDG uptake along right breast skin thickening, new from prior chest CTA and mammogram.    Repeat lung bx 2/14/2018 revealed Squamous Cell CA lung PD-L1 10% EGFR NEGALK NEG  Pt treated for advanced squamous cell CA of lung   She s/p  cycle 6 of carboplatin/Abraxane Day1 completed 7/2/2018 ( day 15 held due to prolonged cytopenias)  She completed therapy with near CR     PET/CT 2/21/2019 showed increased size and irregularity of the right axillary lymph node with increased FDG avidity     MAMMO/US rt breast 3/2019  reveals suspicious findings rt axilla LN.  Biopsy of the lymph node measuring 1.4 x 1.1 x 1.3     Pt s/p  rt axillary LN bxPositive for poorly differentiated carcinoma.- likely breast primary ERneg PRneg Her 2 neg    Outside review of specimen(s) revealed  lung tumor corresponds to a squamous cell carcinoma and appears to be unrelated to the invasive ductal carcinoma of the breast. It was determined The axillary mass, in my opinion, corresponded  to metastases of the breast primary. Although it is a poorly differentiated carcinoma, theimmunophenotype is most consistent with the one that the breast primary exhibited, and is inconsistent with a metastatic squamous cell carcinoma.     CT imaging 4/26/2019 persistent rt axillary LAD, no other sites of disease     Lymph node biopsy 3/11/2019 Right axilla, mass, core biopsy:Positive for poorly differentiated carcinoma.favor breast primary     PET/CT 6/20/2019  New and worsening hypermetabolic lymph nodes concerning for metastatic breast cancer     Previously Discussed  imaging findings in detail with patient which reveals new and worsening hypermetabolic melena metabolic lymph nodes including hypermetabolic supraclavicular node.  Therefore, at treatment option will be systemic chemotherapy in light of the recent imaging findings.  She will be followed by her  surgical oncologist Dr. Christopher      IT was determined to proceed with systemic chemotherapy   S/p  AC [id number] x 3 wks x 4 wks completed 9/6/2019   ( pt has not received in past)      PET/CT 9/19/2019 shows disease response with interval decrease in size and uptake of several right axillary lymph nodes and a supraclavicular lymph node.  Mild residual activity may indicate viable tumor.No new hypermetabolic findings worrisome for malignancy.    Pt followed by  Breast Surgery and plan was  for possible   ALND. Pt with Recurrent cancer in her right axilla and right supraclavicular fossa.   She completed chemotherapy 9/6/2019 with near CR  It was determined  Radiation will be given to the original areas of hypermetabolic activity in the right axilla and right supraclavicular region    Pt completed  RT 12/16/2019     PET/CT 1/14/21 shows   New right axillary hypermetabolic lymph nodes with preserved normal architecture suggestive of reactive nodes.  Stable appearing previously identified hypermetabolic lymph node with mild increase in surrounding fat stranding.        Findings previously d/w with treating breast surgeon and it was determined to cont to monitor and close f/u as pt is heavily pre treated, has received RT to site   Pt acknowledged understanding and agreeable with plan     PET/CT imaging  5/20/21 shows Continued increase in both size and hypermetabolic activity of right axillary level 1 lymph nodes a new, mildly hypermetabolic cutaneous thickening of the right breast.     Right breast, skin, punch biopsy:Negative for carcinoma    LYMPH NODE, RIGHT AXILLA, BIOPSY: 6/16/21   - Metastatic poorly-differentiated carcinoma, consistent with breast primary triple neg  PD-L1 immunohistochemistry studies(combined positive score, CPS) is greater than or equal to 10   PET/CT showed  No definite evidence of hypermetabolic tumor.  Interval resolution of hypermetabolic right axillary lymph nodes.  No new hypermetabolic  findings.      S/p C16 pembrolizumab 6/15/22   Last  PET/CT imaging shows  new left lower lobe opacification with mild radiotracer uptake which may represent aspiration, pneumonia, or malignancy.  Recent follow-up imaging studies decreasing patchy pulmonary consolidation within the left lower lobe when compared to prior PET-CT scan dated 07/13/2022.  Plan to remain off of therapy     3. Check TSH    4. F/b Pulmonology  Pt on supp 02 at night   Cont MDI and O2 as prescribed       Cbc,cmp, Mag, TSH, Free T4  prior to follow-up 6 weeks  PAC FLUSH on same day as follow-up    Advance Care Planning     Power of   I previously  initiated the process of advance care planning today and explained the importance of this process to the patient.  I introduced the concept of advance directives to the patient, as well. Then the patient received detailed information about the importance of designating a Health Care Power of  (HCPOA). She was also instructed to communicate with this person about their wishes for future healthcare, should she become sick and lose decision-making capacity. The patient has previously appointed a HCPOA. After our discussion, the patient has decided to complete a HCPOA and has appointed her son and niece and  I spent a total time of 16 minutes discussing this issue with the patient.         Cc: Swetha Kazt M.D.         MD Ranjan Roberson MD

## 2022-09-28 DIAGNOSIS — E03.2 HYPOTHYROIDISM DUE TO DRUGS: Primary | ICD-10-CM

## 2022-09-30 ENCOUNTER — OFFICE VISIT (OUTPATIENT)
Dept: OTOLARYNGOLOGY | Facility: CLINIC | Age: 77
End: 2022-09-30
Payer: MEDICARE

## 2022-09-30 VITALS — HEIGHT: 63 IN | BODY MASS INDEX: 27.98 KG/M2 | WEIGHT: 157.94 LBS

## 2022-09-30 DIAGNOSIS — R09.89 CHRONIC THROAT CLEARING: ICD-10-CM

## 2022-09-30 DIAGNOSIS — J34.3 HYPERTROPHY OF INFERIOR NASAL TURBINATE: ICD-10-CM

## 2022-09-30 DIAGNOSIS — R13.14 PHARYNGOESOPHAGEAL DYSPHAGIA: Primary | ICD-10-CM

## 2022-09-30 DIAGNOSIS — M95.0 NASAL VALVE COLLAPSE: ICD-10-CM

## 2022-09-30 DIAGNOSIS — R68.2 DRY MOUTH: ICD-10-CM

## 2022-09-30 PROCEDURE — 31575 PR LARYNGOSCOPY, FLEXIBLE; DIAGNOSTIC: ICD-10-PCS | Mod: S$GLB,,, | Performed by: OTOLARYNGOLOGY

## 2022-09-30 PROCEDURE — 1101F PT FALLS ASSESS-DOCD LE1/YR: CPT | Mod: CPTII,S$GLB,, | Performed by: OTOLARYNGOLOGY

## 2022-09-30 PROCEDURE — 1126F PR PAIN SEVERITY QUANTIFIED, NO PAIN PRESENT: ICD-10-PCS | Mod: CPTII,S$GLB,, | Performed by: OTOLARYNGOLOGY

## 2022-09-30 PROCEDURE — 99214 OFFICE O/P EST MOD 30 MIN: CPT | Mod: 25,S$GLB,, | Performed by: OTOLARYNGOLOGY

## 2022-09-30 PROCEDURE — 3044F PR MOST RECENT HEMOGLOBIN A1C LEVEL <7.0%: ICD-10-PCS | Mod: CPTII,S$GLB,, | Performed by: OTOLARYNGOLOGY

## 2022-09-30 PROCEDURE — 3044F HG A1C LEVEL LT 7.0%: CPT | Mod: CPTII,S$GLB,, | Performed by: OTOLARYNGOLOGY

## 2022-09-30 PROCEDURE — 3288F FALL RISK ASSESSMENT DOCD: CPT | Mod: CPTII,S$GLB,, | Performed by: OTOLARYNGOLOGY

## 2022-09-30 PROCEDURE — 1157F ADVNC CARE PLAN IN RCRD: CPT | Mod: CPTII,S$GLB,, | Performed by: OTOLARYNGOLOGY

## 2022-09-30 PROCEDURE — 31575 DIAGNOSTIC LARYNGOSCOPY: CPT | Mod: S$GLB,,, | Performed by: OTOLARYNGOLOGY

## 2022-09-30 PROCEDURE — 1126F AMNT PAIN NOTED NONE PRSNT: CPT | Mod: CPTII,S$GLB,, | Performed by: OTOLARYNGOLOGY

## 2022-09-30 PROCEDURE — 1157F PR ADVANCE CARE PLAN OR EQUIV PRESENT IN MEDICAL RECORD: ICD-10-PCS | Mod: CPTII,S$GLB,, | Performed by: OTOLARYNGOLOGY

## 2022-09-30 PROCEDURE — 1101F PR PT FALLS ASSESS DOC 0-1 FALLS W/OUT INJ PAST YR: ICD-10-PCS | Mod: CPTII,S$GLB,, | Performed by: OTOLARYNGOLOGY

## 2022-09-30 PROCEDURE — 99214 PR OFFICE/OUTPT VISIT, EST, LEVL IV, 30-39 MIN: ICD-10-PCS | Mod: 25,S$GLB,, | Performed by: OTOLARYNGOLOGY

## 2022-09-30 PROCEDURE — 3288F PR FALLS RISK ASSESSMENT DOCUMENTED: ICD-10-PCS | Mod: CPTII,S$GLB,, | Performed by: OTOLARYNGOLOGY

## 2022-09-30 NOTE — PATIENT INSTRUCTIONS
Can try sugar free lozenges with pectin ,  such as halls fruit breezers    Clearing your throat leads to more swelling in the voice box.  This will worsen your symptoms.  You should try to minimize throat clearing as much as possible.    Can try xylitol gum to help moisturize the mouth and throat      Can try gaviscon over the counter - take 15 minutes after a meal as needed for throat phlegm    Avoid mouthwash with alcohol such as listerine. You should use biotene mouth wash    Can sleep with bedside humidifier     You should aim to drink 2 to 3 liters of water daily if you are drinking one cup of coffee.  If you have trouble drinking water, you can cut back or cut out caffeine. ouble drinking water, you can cut back or cut out caffeine.     Spray nose with saline prior to flonase

## 2022-09-30 NOTE — PROGRESS NOTES
OTOLARYNGOLOGY CLINIC NOTE  Date:  09/30/2022     Chief complaint:  Chief Complaint   Patient presents with    Dysphagia     Food gets stuck   For about a year        History of Present Illness  Ade Castellano is a 76 y.o. female  presenting today for a new evaluation and treatment of Dysphagia. Happens with food and liquids.  Has had esophageal dilation in the past ( 2017 and jan 2021). Has had history of lung cancer.  Saw dr. Mckee in august and had bilateral effusions , recommended nasal steroid . Feels better   Nose runs a lot. She also had issue with sneezing. Does flonase once a day which helps and she also takes singulair.    Had egd in jan of 2021. Symptoms started to recur earlier this year.   Had a spot on the left lung and was told had pneumonia potentially. Weight stable after had weight loss with apetitte loss but it is better.  Drinks water and comes back up   Has history of bells palsy  If cant rub the throat to get it to go down the whole bolus of food comes up     Has tried chewing more , does not always help. Can happen with any food.   On occsdion gets hoarse but not bad     + throat clearing     Past Medical History  Past Medical History:   Diagnosis Date    Abnormal stress test 8/5/2020    Acute respiratory failure with hypoxia and hypercapnia 11/29/2017    Angina pectoris     Arthritis     Bell's palsy     left facial weakness    Breast cancer     RIGHT    CAD (coronary artery disease)     Cervical cancer     Chronic bronchitis     Chronic heart failure with preserved ejection fraction 8/5/2020    Chronic heart failure with preserved ejection fraction 8/5/2020    COPD (chronic obstructive pulmonary disease)     Dr. Katz    Dental bridge present     Emphysema of lung     H/O colonoscopy 06/2017    due for repeat colonsocopy in 6/2018    History of Bell's palsy     History of heart artery stent     Dr. Ortiz  x2 stents    Hyperlipidemia     Hypertension     Lung cancer     Myocardial  infarction     SUNITHA (obstructive sleep apnea)     intolerant to mask    SUNITHA (obstructive sleep apnea)     intolerant to mask     Pneumonia     Pneumonia due to other staphylococcus     PUD (peptic ulcer disease)     Severe anemia 6/11/2018    Sleep apnea     Vaginal delivery     x1        Past Surgical History  Past Surgical History:   Procedure Laterality Date    ADENOIDECTOMY      BREAST BIOPSY Right     x3    BREAST LUMPECTOMY      BREAST SURGERY      lumpectomy right side     CERVIX SURGERY      cone    COLONOSCOPY N/A 3/17/2017    Procedure: COLONOSCOPY;  Surgeon: Julio Rudd MD;  Location: [facility] ENDO;  Service: Endoscopy;  Laterality: N/A;    COLONOSCOPY N/A 6/30/2017    Procedure: COLONOSCOPY;  Surgeon: Julio Rudd MD;  Location: [facility] ENDO;  Service: Endoscopy;  Laterality: N/A;    COLONOSCOPY N/A 11/28/2018    Procedure: COLONOSCOPY;  Surgeon: Nura Urbina MD;  Location: [facility] ENDO;  Service: Endoscopy;  Laterality: N/A;    COLONOSCOPY N/A 1/29/2019    Procedure: COLONOSCOPY;  Surgeon: Emmanuel Perez MD;  Location: [facility] ENDO;  Service: Endoscopy;  Laterality: N/A;  confirmed appt-SP    COLONOSCOPY N/A 7/26/2022    Procedure: COLONOSCOPY;  Surgeon: James Healy MD;  Location: [facility] ENDO;  Service: Endoscopy;  Laterality: N/A;  fully vaccinated -sm  mediport in chest -     CORONARY ANGIOPLASTY WITH STENT PLACEMENT      ESOPHAGOGASTRODUODENOSCOPY N/A 1/25/2021    Procedure: EGD (ESOPHAGOGASTRODUODENOSCOPY);  Surgeon: Dmitry Gonzalez MD;  Location: [facility] END[facility];  Service: Endoscopy;  Laterality: N/A;  rapid test >50 miles -ml    EYE SURGERY Bilateral 06/08/2018    cataract     LEFT HEART CATHETERIZATION Left 8/13/2020    Procedure: Left heart cath, rra, noon;  Surgeon: Guevara Skelton MD;  Location: [facility] CATH LAB;  Service: Cardiology;  Laterality: Left;  RN PREOP 8/7/2020---COVID NEGATIVE ON 8/12    PORTACATH PLACEMENT Right 01/2018    sweat glands axillary regions Bilateral     TONSILLECTOMY           Medications  Current Outpatient Medications on File Prior to Visit   Medication Sig Dispense Refill    acetaminophen (TYLENOL) 650 MG TbSR Take 650 mg by mouth every 8 (eight) hours.      albuterol (PROVENTIL) 2.5 mg /3 mL (0.083 %) nebulizer solution NEBULIZE 1 vial EVERY 6 HOURS AS NEEDED FOR WHEEZING 540 mL 1    albuterol (VENTOLIN HFA) 90 mcg/actuation inhaler INHALE 2 PUFF(S) BY MOUTH EVERY 4 - 6 HOURS AS NEEDED FOR SHORTNESS OF BREATH or WHEEZING 18 g 5    aspirin (ECOTRIN) 81 MG EC tablet Take 81 mg by mouth once daily.       benzonatate (TESSALON PERLES) 100 MG capsule Take 1 capsule (100 mg total) by mouth every 6 (six) hours as needed for Cough. 30 capsule 1    carboxymethylcellulose (REFRESH PLUS) 0.5 % Dpet 1 drop 3 (three) times daily as needed.      fluticasone propionate (FLONASE) 50 mcg/actuation nasal spray USE TWO SPRAYS IN EACH NOSTRIL ONCE A DAY 16 g 5    heparin, porcine, PF, (HEPARIN FLUSH 100 UNITS/ML) 100 unit/mL Syrg       isosorbide mononitrate (IMDUR) 30 MG 24 hr tablet Take 1 tablet (30 mg total) by mouth once daily. Hold if SBP <120 30 tablet 11    KEYTRUDA 25 mg/mL Soln Inject into the vein every 21 days.      meclizine (ANTIVERT) 25 mg tablet Take 1 tablet (25 mg total) by mouth 2 (two) times daily as needed for Dizziness. 30 tablet 2    montelukast (SINGULAIR) 10 mg tablet TAKE 1 TABLET BY MOUTH EVERY EVENING 30 tablet 2    nitroGLYCERIN (NITROSTAT) 0.4 MG SL tablet PLACE 1 TAB UNDER TONGUE EVERY 5 MINUTES x 3 DOSES AS NEEDED FOR CHEST PAIN. IF NOT RESOLVED CALL 911 90 tablet 0    omeprazole (PRILOSEC) 40 MG capsule Take 40 mg by mouth once daily.      potassium chloride (MICRO-K) 10 MEQ CpSR Take 1 capsule (10 mEq total) by mouth 2 (two) times daily. 60 capsule 11    rosuvastatin (CRESTOR) 10 MG tablet TAKE 1 TABLET BY MOUTH EVERY DAY (Patient taking differently: Take 10 mg by mouth every evening.) 30 tablet 11    TRELEGY ELLIPTA 100-62.5-25 mcg DsDv INHALE ONE PUFF INTO THE  "LUNGS ONCE A DAY  5    [DISCONTINUED] metoprolol tartrate (LOPRESSOR) 25 MG tablet TAKE ONE TABLET BY MOUTH TWICE DAILY FOR BLOOD PRESSURE 180 tablet 2     No current facility-administered medications on file prior to visit.       Review of Systems  Review of Systems   Constitutional: Negative.    HENT: Negative.     Eyes: Negative.    Respiratory: Negative.     Cardiovascular: Negative.    Gastrointestinal: Negative.    Genitourinary: Negative.    Musculoskeletal: Negative.    Skin: Negative.    Neurological: Negative.    Psychiatric/Behavioral: Negative.        Social History   reports that she quit smoking about 4 years ago. Her smoking use included cigarettes. She has a 12.50 pack-year smoking history. She has never used smokeless tobacco. She reports that she does not drink alcohol and does not use drugs.     Family History  Family History   Problem Relation Age of Onset    Cancer Mother         breast    Heart disease Mother     Breast cancer Mother     Cancer Father         lung-smoker     Cancer Sister         lung-smoker     Heart attack Sister     Cancer Maternal Grandmother     Heart disease Maternal Grandmother     Cancer Maternal Grandfather     Heart disease Maternal Grandfather     Cancer Paternal Grandmother     Cancer Paternal Grandfather     Cancer Sister         mets not sure where it started     COPD Sister     Heart disease Sister     Kidney cancer Son     Colon cancer Neg Hx     Esophageal cancer Neg Hx         Physical Exam   There were no vitals filed for this visit. Body mass index is 27.98 kg/m².  Weight: 71.6 kg (157 lb 15.4 oz)   Height: 5' 3" (160 cm)     GENERAL: no acute distress.  HEAD: normocephalic.   EYES: lids and lashes normal. No scleral icterus  EARS: external ear without lesion, normal pinna shape and position.  External auditory canal with normal cerumen, tympanic membrane fully visible, no perforation , no retraction. No middle ear effusion. Ossicles intact. Slightly dull " but no overt effusion   NOSE: external nose without significant bony abnormality  ORAL CAVITY/OROPHARYNX: tongue midline and mobile. Symmetric palate rise. Uvula midline.   NECK: trachea midline.   LYMPH NODES:No cervical lymphadenopathy.  RESPIRATORY: no stridor, no stertor. Voice normal. Respirations nonlabored.  NEURO: alert, responds to questions appropriately.   PSYCH:mood appropriate    PROCEDURE NOTE  NAME OF PROCEDURE: Flexible Laryngoscopy, diagnostic  INDICATIONS: gag reflex precludes mirror exam, dysphagia   FINDINGS: mild postcricoid edema, no malignancy, mucosa dry     Consent: After procedure was explained in detail and all questions answered, verbal consent was obtained for performing flexible laryngoscopy.  Anesthesia: topical 4% lidocaine and neosynephrine  Procedure: With patient in seated position, the scope was inserted into the bilateral nasal passageway and advanced atraumatically into the nasopharynx to examine the following structures:  Nasal cavity: Turbinates with  hypertrophy. no middle meatal edema. No purulent drainage.   Nasopharynx: no mass or lesion noted in nasopharynx.   Oropharynx: base of tongue without  mass or ulceration. Lingual tonsils normal in appearance  Hypopharynx: posterior pharyngeal wall without mass or lesion. No pooling of secretions. Pyriform sinuses visible without mass or lesion  Larynx: epiglottis normal without lesion. False vocal folds without edema/erythema/lesion. True vocal folds mobile and without lesion. No interarytenoid edema no erythema . Postcricoid region with mild edema no lesion   Subglottis: visualized portion of subglottis normal in appearance    After examination performed, the scope was removed atraumatically . The patient tolerated the procedure well. Photodocumentation obtained with representative images below, all images and/or videos uploaded in media section of epic.                                      Imaging:  The patient does not have  any pertinent and/or recent imaging of the head and neck. Chest ct images and report reviewed Slightly patulous esophagus on chest ct . No overt laryngeal lesion but not ideal imaging modality for full evaluation of this    Labs:  CBC  Recent Labs   Lab 07/19/22  0731 08/22/22  0748 09/26/22  0736   WBC 6.38 4.53 4.53   Hemoglobin 12.8 13.3 13.3   Hematocrit 39.1 40.2 40.5   MCV 92 91 91   Platelets 299 178 219     BMP  Recent Labs   Lab 04/06/22  0941 04/06/22  1551 04/07/22  0529 04/11/22  0855 07/19/22  0731 08/22/22  0748 09/26/22  0736   Glucose 130 H   < > 130 H   < > 129 H 120 H 123 H   Sodium 138   < > 138   < > 140 140 140   Potassium 2.8 L   < > 3.4 L   < > 4.3 4.1 3.4 L   Chloride 96   < > 105   < > 104 107 108   CO2 29   < > 24   < > 28 21 L 22 L   BUN 14   < > 13   < > 13 19 18   Creatinine 1.0   < > 0.8   < > 0.9 0.9 1.0   Calcium 8.9   < > 8.5 L   < > 9.7 9.4 9.3   Phosphorus 3.1  --  2.9  --   --   --   --    Magnesium 1.8   < >  --    < > 1.5 L 1.6 1.6    < > = values in this interval not displayed.     COAGS  Recent Labs   Lab 09/18/20  0925   INR 1.0       Assessment  1. Pharyngoesophageal dysphagia  - Ambulatory referral/consult to Gastroenterology; Future    2. Dry mouth    3. Hypertrophy of inferior nasal turbinate    4. Chronic throat clearing    5. Nasal valve collapse     Plan:  Discussed plan of care with patient in detail and all questions answered. Patient reported understanding of plan of care.     Will get mbs if gi workup inconclusive; has had improvement in symptoms with dilation in the past    Xylitol gum and pectin lozenges  Hydration and vocal hygiene discussed, no throat clearing    Nasal saline for turbinate hypertrophy; offered and explained purpose of dual medication sprays, she would like to try saline with flonase first, will contact me if wants to try astelin     Slight nasal valve collapse on left- can try breathe right strip at night    F/u 3-4 months after completes gi  visit    I spent a total of 35 minutes on the day of the visit.  This includes face to face time and non-face to face time preparing to see the patient (eg, review of tests), obtaining and/or reviewing separately obtained history, documenting clinical information in the electronic or other health record, independently interpreting results and communicating results to the patient/family/caregiver, or care coordinator.    Please be aware that this note has been generated with the assistance of [employer] voice-to-text.  Please excuse any spelling or grammatical errors.

## 2022-10-05 ENCOUNTER — OFFICE VISIT (OUTPATIENT)
Dept: GASTROENTEROLOGY | Facility: CLINIC | Age: 77
End: 2022-10-05
Payer: MEDICARE

## 2022-10-05 VITALS
HEIGHT: 63 IN | SYSTOLIC BLOOD PRESSURE: 134 MMHG | HEART RATE: 54 BPM | DIASTOLIC BLOOD PRESSURE: 84 MMHG | WEIGHT: 159.19 LBS | BODY MASS INDEX: 28.21 KG/M2

## 2022-10-05 DIAGNOSIS — K21.9 GASTROESOPHAGEAL REFLUX DISEASE, UNSPECIFIED WHETHER ESOPHAGITIS PRESENT: Primary | ICD-10-CM

## 2022-10-05 DIAGNOSIS — R13.14 PHARYNGOESOPHAGEAL DYSPHAGIA: ICD-10-CM

## 2022-10-05 PROCEDURE — 1159F PR MEDICATION LIST DOCUMENTED IN MEDICAL RECORD: ICD-10-PCS | Mod: CPTII,S$GLB,, | Performed by: STUDENT IN AN ORGANIZED HEALTH CARE EDUCATION/TRAINING PROGRAM

## 2022-10-05 PROCEDURE — 99999 PR PBB SHADOW E&M-EST. PATIENT-LVL V: ICD-10-PCS | Mod: PBBFAC,,, | Performed by: STUDENT IN AN ORGANIZED HEALTH CARE EDUCATION/TRAINING PROGRAM

## 2022-10-05 PROCEDURE — 3079F DIAST BP 80-89 MM HG: CPT | Mod: CPTII,S$GLB,, | Performed by: STUDENT IN AN ORGANIZED HEALTH CARE EDUCATION/TRAINING PROGRAM

## 2022-10-05 PROCEDURE — 99214 OFFICE O/P EST MOD 30 MIN: CPT | Mod: S$GLB,,, | Performed by: STUDENT IN AN ORGANIZED HEALTH CARE EDUCATION/TRAINING PROGRAM

## 2022-10-05 PROCEDURE — 3288F FALL RISK ASSESSMENT DOCD: CPT | Mod: CPTII,S$GLB,, | Performed by: STUDENT IN AN ORGANIZED HEALTH CARE EDUCATION/TRAINING PROGRAM

## 2022-10-05 PROCEDURE — 3044F HG A1C LEVEL LT 7.0%: CPT | Mod: CPTII,S$GLB,, | Performed by: STUDENT IN AN ORGANIZED HEALTH CARE EDUCATION/TRAINING PROGRAM

## 2022-10-05 PROCEDURE — 3079F PR MOST RECENT DIASTOLIC BLOOD PRESSURE 80-89 MM HG: ICD-10-PCS | Mod: CPTII,S$GLB,, | Performed by: STUDENT IN AN ORGANIZED HEALTH CARE EDUCATION/TRAINING PROGRAM

## 2022-10-05 PROCEDURE — 1101F PT FALLS ASSESS-DOCD LE1/YR: CPT | Mod: CPTII,S$GLB,, | Performed by: STUDENT IN AN ORGANIZED HEALTH CARE EDUCATION/TRAINING PROGRAM

## 2022-10-05 PROCEDURE — 1157F ADVNC CARE PLAN IN RCRD: CPT | Mod: CPTII,S$GLB,, | Performed by: STUDENT IN AN ORGANIZED HEALTH CARE EDUCATION/TRAINING PROGRAM

## 2022-10-05 PROCEDURE — 99214 PR OFFICE/OUTPT VISIT, EST, LEVL IV, 30-39 MIN: ICD-10-PCS | Mod: S$GLB,,, | Performed by: STUDENT IN AN ORGANIZED HEALTH CARE EDUCATION/TRAINING PROGRAM

## 2022-10-05 PROCEDURE — 99999 PR PBB SHADOW E&M-EST. PATIENT-LVL V: CPT | Mod: PBBFAC,,, | Performed by: STUDENT IN AN ORGANIZED HEALTH CARE EDUCATION/TRAINING PROGRAM

## 2022-10-05 PROCEDURE — 1126F PR PAIN SEVERITY QUANTIFIED, NO PAIN PRESENT: ICD-10-PCS | Mod: CPTII,S$GLB,, | Performed by: STUDENT IN AN ORGANIZED HEALTH CARE EDUCATION/TRAINING PROGRAM

## 2022-10-05 PROCEDURE — 3288F PR FALLS RISK ASSESSMENT DOCUMENTED: ICD-10-PCS | Mod: CPTII,S$GLB,, | Performed by: STUDENT IN AN ORGANIZED HEALTH CARE EDUCATION/TRAINING PROGRAM

## 2022-10-05 PROCEDURE — 1101F PR PT FALLS ASSESS DOC 0-1 FALLS W/OUT INJ PAST YR: ICD-10-PCS | Mod: CPTII,S$GLB,, | Performed by: STUDENT IN AN ORGANIZED HEALTH CARE EDUCATION/TRAINING PROGRAM

## 2022-10-05 PROCEDURE — 1159F MED LIST DOCD IN RCRD: CPT | Mod: CPTII,S$GLB,, | Performed by: STUDENT IN AN ORGANIZED HEALTH CARE EDUCATION/TRAINING PROGRAM

## 2022-10-05 PROCEDURE — 1126F AMNT PAIN NOTED NONE PRSNT: CPT | Mod: CPTII,S$GLB,, | Performed by: STUDENT IN AN ORGANIZED HEALTH CARE EDUCATION/TRAINING PROGRAM

## 2022-10-05 PROCEDURE — 3044F PR MOST RECENT HEMOGLOBIN A1C LEVEL <7.0%: ICD-10-PCS | Mod: CPTII,S$GLB,, | Performed by: STUDENT IN AN ORGANIZED HEALTH CARE EDUCATION/TRAINING PROGRAM

## 2022-10-05 PROCEDURE — 3075F PR MOST RECENT SYSTOLIC BLOOD PRESS GE 130-139MM HG: ICD-10-PCS | Mod: CPTII,S$GLB,, | Performed by: STUDENT IN AN ORGANIZED HEALTH CARE EDUCATION/TRAINING PROGRAM

## 2022-10-05 PROCEDURE — 3075F SYST BP GE 130 - 139MM HG: CPT | Mod: CPTII,S$GLB,, | Performed by: STUDENT IN AN ORGANIZED HEALTH CARE EDUCATION/TRAINING PROGRAM

## 2022-10-05 PROCEDURE — 1157F PR ADVANCE CARE PLAN OR EQUIV PRESENT IN MEDICAL RECORD: ICD-10-PCS | Mod: CPTII,S$GLB,, | Performed by: STUDENT IN AN ORGANIZED HEALTH CARE EDUCATION/TRAINING PROGRAM

## 2022-10-05 NOTE — PROGRESS NOTES
[facility] Consultation Note    Reason for Consult:  The primary encounter diagnosis was Gastroesophageal reflux disease, unspecified whether esophagitis present. A diagnosis of Pharyngoesophageal dysphagia was also pertinent to this visit.    PCP:   Jeannine Huitron   [address] / [place] [id number]    Referring MD:  Cristela Johnson Md  [address]  [place] [address]    HPI:  This is a 76 y.o. female here for evaluation of dysphagia.    The patient notes that she is having difficulty swallowing both solids and liquids.  This has been present for months and is getting worse.  She feels that it was initially solids, but has progressed to liquids.  Symptoms are intermittent.      Denies any heartburn or reflux and notes symptoms are well controlled on prilosec.    Last EGD was in 2021 notable for a schatzki ring, dilated to 18mm.      ROS:  Constitutional: No fevers, chills, No weight loss  ENT: No allergies  CV: No chest pain  Pulm: No cough, No shortness of breath  Ophtho: No vision changes  GI: see HPI  Derm: No rash  Heme: No lymphadenopathy, No bruising  MSK: No arthritis  : No dysuria, No hematuria  Endo: No hot or cold intolerance  Neuro: No syncope, No seizure  Psych: No anxiety, No depression    Medical History:  has a past medical history of Abnormal stress test (8/5/2020), Acute respiratory failure with hypoxia and hypercapnia (11/29/2017), Angina pectoris, Arthritis, Bell's palsy, Breast cancer, CAD (coronary artery disease), Cervical cancer, Chronic bronchitis, Chronic heart failure with preserved ejection fraction (8/5/2020), Chronic heart failure with preserved ejection fraction (8/5/2020), COPD (chronic obstructive pulmonary disease), Dental bridge present, Emphysema of lung, H/O colonoscopy (06/2017), History of Bell's palsy, History of heart artery stent, Hyperlipidemia, Hypertension, Lung cancer, Myocardial infarction, SUNITHA (obstructive sleep apnea), SUNITHA  (obstructive sleep apnea), Pneumonia, Pneumonia due to other staphylococcus, PUD (peptic ulcer disease), Severe anemia (6/11/2018), Sleep apnea, and Vaginal delivery.    Surgical History:  has a past surgical history that includes Cervix surgery; Breast surgery; Tonsillectomy; Adenoidectomy; sweat glands axillary regions (Bilateral); Colonoscopy (N/A, 3/17/2017); Breast biopsy (Right); Colonoscopy (N/A, 6/30/2017); Portacath placement (Right, 01/2018); Colonoscopy (N/A, 11/28/2018); Colonoscopy (N/A, 1/29/2019); Eye surgery (Bilateral, 06/08/2018); Breast lumpectomy; Left heart catheterization (Left, 8/13/2020); Esophagogastroduodenoscopy (N/A, 1/25/2021); Coronary angioplasty with stent; and Colonoscopy (N/A, 7/26/2022).    Family History: family history includes Breast cancer in her mother; COPD in her sister; Cancer in her father, maternal grandfather, maternal grandmother, mother, paternal grandfather, paternal grandmother, sister, and sister; Heart attack in her sister; Heart disease in her maternal grandfather, maternal grandmother, mother, and sister; Kidney cancer in her son..     Social History:  reports that she quit smoking about 4 years ago. Her smoking use included cigarettes. She has a 12.50 pack-year smoking history. She has never used smokeless tobacco. She reports that she does not drink alcohol and does not use drugs.    Review of patient's allergies indicates:  No Known Allergies    Current Outpatient Rx   Medication Sig Dispense Refill    acetaminophen (TYLENOL) 650 MG TbSR Take 650 mg by mouth every 8 (eight) hours.      albuterol (PROVENTIL) 2.5 mg /3 mL (0.083 %) nebulizer solution NEBULIZE 1 vial EVERY 6 HOURS AS NEEDED FOR WHEEZING 540 mL 1    albuterol (VENTOLIN HFA) 90 mcg/actuation inhaler INHALE 2 PUFF(S) BY MOUTH EVERY 4 - 6 HOURS AS NEEDED FOR SHORTNESS OF BREATH or WHEEZING 18 g 5    aspirin (ECOTRIN) 81 MG EC tablet Take 81 mg by mouth once daily.       benzonatate (TESSALON PERLES)  "100 MG capsule Take 1 capsule (100 mg total) by mouth every 6 (six) hours as needed for Cough. 30 capsule 1    carboxymethylcellulose (REFRESH PLUS) 0.5 % Dpet 1 drop 3 (three) times daily as needed.      fluticasone propionate (FLONASE) 50 mcg/actuation nasal spray USE TWO SPRAYS IN EACH NOSTRIL ONCE A DAY 16 g 5    isosorbide mononitrate (IMDUR) 30 MG 24 hr tablet Take 1 tablet (30 mg total) by mouth once daily. Hold if SBP <120 30 tablet 11    meclizine (ANTIVERT) 25 mg tablet Take 1 tablet (25 mg total) by mouth 2 (two) times daily as needed for Dizziness. 30 tablet 2    montelukast (SINGULAIR) 10 mg tablet TAKE 1 TABLET BY MOUTH EVERY EVENING 30 tablet 2    nitroGLYCERIN (NITROSTAT) 0.4 MG SL tablet PLACE 1 TAB UNDER TONGUE EVERY 5 MINUTES x 3 DOSES AS NEEDED FOR CHEST PAIN. IF NOT RESOLVED CALL 911 90 tablet 0    omeprazole (PRILOSEC) 40 MG capsule Take 40 mg by mouth once daily.      rosuvastatin (CRESTOR) 10 MG tablet TAKE 1 TABLET BY MOUTH EVERY DAY (Patient taking differently: Take 10 mg by mouth every evening.) 30 tablet 11    sodium chloride (SALINE NASAL) 0.65 % nasal spray 1 spray by Nasal route as needed for Congestion.      TRELEGY ELLIPTA 100-62.5-25 mcg DsDv INHALE ONE PUFF INTO THE LUNGS ONCE A DAY  5    heparin, porcine, PF, (HEPARIN FLUSH 100 UNITS/ML) 100 unit/mL Syrg       KEYTRUDA 25 mg/mL Soln Inject into the vein every 21 days.      potassium chloride (MICRO-K) 10 MEQ CpSR Take 1 capsule (10 mEq total) by mouth 2 (two) times daily. (Patient not taking: Reported on 10/5/2022) 60 capsule 11       Objective Findings:    Vital Signs:  /84   Pulse (!) 54   Ht 5' 3" (1.6 m)   Wt 72.2 kg (159 lb 2.8 oz)   LMP  (LMP Unknown)   BMI 28.20 kg/m²   Body mass index is 28.2 kg/m².    Physical Exam:  General Appearance: Well appearing in no acute distress  Head:   Normocephalic, without obvious abnormality  Eyes:    No scleral icterus, EOMI  ENT: Neck supple, Lips, mucosa, and tongue normal; " teeth and gums normal  Lungs: CTA bilaterally in anterior and posterior fields, no wheezes, no crackles.  Heart:  Regular rate and rhythm, S1, S2 normal, no murmurs heard  Abdomen: Soft, non tender, non distended with positive bowel sounds in all four quadrants. No hepatosplenomegaly, ascites, or mass  Extremities: 2+ pulses, no clubbing, cyanosis or edema  Skin: No rash  Neurologic: CN II-XII intact      Labs:  Lab Results   Component Value Date    WBC 4.53 09/26/2022    HGB 13.3 09/26/2022    HCT 40.5 09/26/2022     09/26/2022    CHOL 139 09/18/2020    TRIG 191 (H) 09/18/2020    HDL 51 09/18/2020    ALT 14 09/26/2022    AST 15 09/26/2022     09/26/2022    K 3.4 (L) 09/26/2022     09/26/2022    CREATININE 1.0 09/26/2022    BUN 18 09/26/2022    CO2 22 (L) 09/26/2022    TSH 3.036 09/26/2022    INR 1.0 09/18/2020    HGBA1C 5.9 (H) 04/07/2022       Imaging:  CT Chest 8/25/22  Impression:     Decreasing patchy pulmonary consolidation within the left lower lobe when compared to prior PET-CT scan dated 07/13/2022.  The overall appearance of the patchy consolidation as well as the moderate improvement when compared to the prior study suggest a benign etiology such as left lower lobe pneumonia.     Persistent band of atelectasis/scarring within the right middle lobe, stable dating back to 04/26/2019.     Coronary artery atherosclerosis in a multi vessel distribution.     There are no measurable lesions per RECIST criteria.    Endoscopy:    EGD 1/25/21  Findings:        A mild Schatzki ring was found at the gastroesophageal junction. A        TTS dilator was passed through the scope. Dilation with a 15-16.5-18        mm balloon dilator was performed to 18 mm.        A small hiatal hernia was present.        The stomach was normal.        The cardia and gastric fundus were normal on retroflexion.        The examined duodenum was normal.     Colonoscopy 7/26/22  Findings:        The perianal and digital  rectal examinations were normal.        Five sessile polyps were found in the hepatic flexure and ascending        colon. The polyps were 3 to 4 mm in size. These polyps were removed        with a jumbo cold forceps. Resection and retrieval were complete.        A 7 mm polyp was found in the descending colon. The polyp was        sessile. The polyp was removed with a cold snare. Resection and        retrieval were complete.        A 4 mm polyp was found in the descending colon. The polyp was        sessile. The polyp was removed with a cold snare. Resection and        retrieval were complete.        Non-bleeding external hemorrhoids were found during retroflexion.        The hemorrhoids were small and Grade I (internal hemorrhoids that do        not prolapse).     Assessment:  1. Gastroesophageal reflux disease, unspecified whether esophagitis present    2. Pharyngoesophageal dysphagia      In brief, the patient is a 75yo woman who presents with recurring dysphagia.    The patient has a history of dysphagia and prior GEJ ring requiring dilation.  Her symptoms sound consistent with a motility abnormality and given her age I suspect ineffective esophageal motility.  However, given her prior ring and improvement with dilation, I will repeat her EGD to assess for luminal narrowing.      If her EGD is unrevealing, we will proceed with an esophageal manometry.     Follow up in about 3 months (around 1/5/2023).      Order summary:  Orders Placed This Encounter    Ambulatory referral/consult to Endo Procedure          Thank you so much for allowing me to participate in the care of Ade Healy MD

## 2022-10-17 ENCOUNTER — OFFICE VISIT (OUTPATIENT)
Dept: FAMILY MEDICINE | Facility: CLINIC | Age: 77
End: 2022-10-17
Payer: MEDICARE

## 2022-10-17 ENCOUNTER — LAB VISIT (OUTPATIENT)
Dept: LAB | Facility: HOSPITAL | Age: 77
End: 2022-10-17
Attending: FAMILY MEDICINE
Payer: MEDICARE

## 2022-10-17 ENCOUNTER — TELEPHONE (OUTPATIENT)
Dept: GASTROENTEROLOGY | Facility: CLINIC | Age: 77
End: 2022-10-17
Payer: MEDICARE

## 2022-10-17 VITALS
RESPIRATION RATE: 18 BRPM | HEART RATE: 49 BPM | BODY MASS INDEX: 28.56 KG/M2 | WEIGHT: 161.19 LBS | DIASTOLIC BLOOD PRESSURE: 62 MMHG | TEMPERATURE: 98 F | OXYGEN SATURATION: 99 % | SYSTOLIC BLOOD PRESSURE: 112 MMHG | HEIGHT: 63 IN

## 2022-10-17 DIAGNOSIS — I25.118 CORONARY ARTERY DISEASE OF NATIVE ARTERY OF NATIVE HEART WITH STABLE ANGINA PECTORIS: ICD-10-CM

## 2022-10-17 DIAGNOSIS — I50.32 CHRONIC HEART FAILURE WITH PRESERVED EJECTION FRACTION: ICD-10-CM

## 2022-10-17 DIAGNOSIS — Z23 NEEDS FLU SHOT: Primary | ICD-10-CM

## 2022-10-17 DIAGNOSIS — J30.9 ALLERGIC SINUSITIS: ICD-10-CM

## 2022-10-17 DIAGNOSIS — I70.0 ATHEROSCLEROSIS OF AORTA: ICD-10-CM

## 2022-10-17 DIAGNOSIS — K21.00 GASTROESOPHAGEAL REFLUX DISEASE WITH ESOPHAGITIS, UNSPECIFIED WHETHER HEMORRHAGE: ICD-10-CM

## 2022-10-17 DIAGNOSIS — I10 PRIMARY HYPERTENSION: ICD-10-CM

## 2022-10-17 DIAGNOSIS — Z78.0 POST-MENOPAUSAL: ICD-10-CM

## 2022-10-17 DIAGNOSIS — E78.5 HYPERLIPIDEMIA LDL GOAL <70: ICD-10-CM

## 2022-10-17 LAB
CHOLEST SERPL-MCNC: 138 MG/DL (ref 120–199)
CHOLEST/HDLC SERPL: 2.3 {RATIO} (ref 2–5)
HDLC SERPL-MCNC: 60 MG/DL (ref 40–75)
HDLC SERPL: 43.5 % (ref 20–50)
LDLC SERPL CALC-MCNC: 55.4 MG/DL (ref 63–159)
NONHDLC SERPL-MCNC: 78 MG/DL
TRIGL SERPL-MCNC: 113 MG/DL (ref 30–150)

## 2022-10-17 PROCEDURE — 1159F PR MEDICATION LIST DOCUMENTED IN MEDICAL RECORD: ICD-10-PCS | Mod: CPTII,S$GLB,, | Performed by: FAMILY MEDICINE

## 2022-10-17 PROCEDURE — 90694 VACC AIIV4 NO PRSRV 0.5ML IM: CPT | Mod: S$GLB,,, | Performed by: FAMILY MEDICINE

## 2022-10-17 PROCEDURE — 1157F ADVNC CARE PLAN IN RCRD: CPT | Mod: CPTII,S$GLB,, | Performed by: FAMILY MEDICINE

## 2022-10-17 PROCEDURE — G0008 FLU VACCINE - QUADRIVALENT - ADJUVANTED: ICD-10-PCS | Mod: S$GLB,,, | Performed by: FAMILY MEDICINE

## 2022-10-17 PROCEDURE — 99999 PR PBB SHADOW E&M-EST. PATIENT-LVL IV: ICD-10-PCS | Mod: PBBFAC,,, | Performed by: FAMILY MEDICINE

## 2022-10-17 PROCEDURE — 99214 PR OFFICE/OUTPT VISIT, EST, LEVL IV, 30-39 MIN: ICD-10-PCS | Mod: S$GLB,,, | Performed by: FAMILY MEDICINE

## 2022-10-17 PROCEDURE — 1160F PR REVIEW ALL MEDS BY PRESCRIBER/CLIN PHARMACIST DOCUMENTED: ICD-10-PCS | Mod: CPTII,S$GLB,, | Performed by: FAMILY MEDICINE

## 2022-10-17 PROCEDURE — 99214 OFFICE O/P EST MOD 30 MIN: CPT | Mod: S$GLB,,, | Performed by: FAMILY MEDICINE

## 2022-10-17 PROCEDURE — 80061 LIPID PANEL: CPT | Performed by: FAMILY MEDICINE

## 2022-10-17 PROCEDURE — 1101F PR PT FALLS ASSESS DOC 0-1 FALLS W/OUT INJ PAST YR: ICD-10-PCS | Mod: CPTII,S$GLB,, | Performed by: FAMILY MEDICINE

## 2022-10-17 PROCEDURE — G0008 ADMIN INFLUENZA VIRUS VAC: HCPCS | Mod: S$GLB,,, | Performed by: FAMILY MEDICINE

## 2022-10-17 PROCEDURE — 1126F PR PAIN SEVERITY QUANTIFIED, NO PAIN PRESENT: ICD-10-PCS | Mod: CPTII,S$GLB,, | Performed by: FAMILY MEDICINE

## 2022-10-17 PROCEDURE — 36415 COLL VENOUS BLD VENIPUNCTURE: CPT | Mod: PO | Performed by: FAMILY MEDICINE

## 2022-10-17 PROCEDURE — 3074F PR MOST RECENT SYSTOLIC BLOOD PRESSURE < 130 MM HG: ICD-10-PCS | Mod: CPTII,S$GLB,, | Performed by: FAMILY MEDICINE

## 2022-10-17 PROCEDURE — 3288F PR FALLS RISK ASSESSMENT DOCUMENTED: ICD-10-PCS | Mod: CPTII,S$GLB,, | Performed by: FAMILY MEDICINE

## 2022-10-17 PROCEDURE — 3078F PR MOST RECENT DIASTOLIC BLOOD PRESSURE < 80 MM HG: ICD-10-PCS | Mod: CPTII,S$GLB,, | Performed by: FAMILY MEDICINE

## 2022-10-17 PROCEDURE — 3074F SYST BP LT 130 MM HG: CPT | Mod: CPTII,S$GLB,, | Performed by: FAMILY MEDICINE

## 2022-10-17 PROCEDURE — 1160F RVW MEDS BY RX/DR IN RCRD: CPT | Mod: CPTII,S$GLB,, | Performed by: FAMILY MEDICINE

## 2022-10-17 PROCEDURE — 1101F PT FALLS ASSESS-DOCD LE1/YR: CPT | Mod: CPTII,S$GLB,, | Performed by: FAMILY MEDICINE

## 2022-10-17 PROCEDURE — 90694 FLU VACCINE - QUADRIVALENT - ADJUVANTED: ICD-10-PCS | Mod: S$GLB,,, | Performed by: FAMILY MEDICINE

## 2022-10-17 PROCEDURE — 1126F AMNT PAIN NOTED NONE PRSNT: CPT | Mod: CPTII,S$GLB,, | Performed by: FAMILY MEDICINE

## 2022-10-17 PROCEDURE — 3288F FALL RISK ASSESSMENT DOCD: CPT | Mod: CPTII,S$GLB,, | Performed by: FAMILY MEDICINE

## 2022-10-17 PROCEDURE — 1159F MED LIST DOCD IN RCRD: CPT | Mod: CPTII,S$GLB,, | Performed by: FAMILY MEDICINE

## 2022-10-17 PROCEDURE — 1157F PR ADVANCE CARE PLAN OR EQUIV PRESENT IN MEDICAL RECORD: ICD-10-PCS | Mod: CPTII,S$GLB,, | Performed by: FAMILY MEDICINE

## 2022-10-17 PROCEDURE — 3078F DIAST BP <80 MM HG: CPT | Mod: CPTII,S$GLB,, | Performed by: FAMILY MEDICINE

## 2022-10-17 PROCEDURE — 99999 PR PBB SHADOW E&M-EST. PATIENT-LVL IV: CPT | Mod: PBBFAC,,, | Performed by: FAMILY MEDICINE

## 2022-10-17 RX ORDER — POLYETHYLENE GLYCOL-3350 AND ELECTROLYTES 236; 6.74; 5.86; 2.97; 22.74 G/274.31G; G/274.31G; G/274.31G; G/274.31G; G/274.31G
4000 POWDER, FOR SOLUTION ORAL
COMMUNITY
Start: 2022-06-20 | End: 2022-10-17

## 2022-10-17 RX ORDER — LEVOCETIRIZINE DIHYDROCHLORIDE 5 MG/1
5 TABLET, FILM COATED ORAL NIGHTLY
Qty: 30 TABLET | Refills: 11 | Status: SHIPPED | OUTPATIENT
Start: 2022-10-17 | End: 2023-10-09

## 2022-10-17 RX ORDER — METOPROLOL TARTRATE 25 MG/1
TABLET, FILM COATED ORAL
COMMUNITY
Start: 2022-09-21 | End: 2022-12-19 | Stop reason: SDUPTHER

## 2022-10-17 RX ORDER — MAGNESIUM SULFATE HEPTAHYDRATE 500 MG/ML
INJECTION, SOLUTION INTRAMUSCULAR; INTRAVENOUS
COMMUNITY
Start: 2022-07-19 | End: 2022-10-17

## 2022-10-17 RX ORDER — PROPOFOL 10 MG/ML
INJECTION, EMULSION INTRAVENOUS
COMMUNITY
Start: 2022-07-26 | End: 2022-10-17

## 2022-10-17 RX ORDER — PANTOPRAZOLE SODIUM 40 MG/1
40 TABLET, DELAYED RELEASE ORAL DAILY
Qty: 30 TABLET | Refills: 11 | Status: SHIPPED | OUTPATIENT
Start: 2022-10-17 | End: 2023-05-17 | Stop reason: SDUPTHER

## 2022-10-17 NOTE — H&P (VIEW-ONLY)
Subjective:       Patient ID: Ade Castellano is a 77 y.o. female.    Chief Complaint: Follow-up    77 yfea rold female is her for follow up. She has hypertension and CAD. She is s/p  breast and lung cancer. She is doing well. She has to take magnesium and potassium pille every now and then due to deficiencies, but otherwise has no issues. She is being followed by Dr. Holliday.  She states  she is no longer on chemotherapy. She states they are watching a spot on her lung, but it is believed to be scarring from pneumonia. She is exercing and cutting her grass again.  She admits to a dry, nonproductive cough.  She has mild AS reflux as well.  She does have some issues with swallowing and is scheduled to see a gastroenterologist to have her esophagus dilated.  She is currently on Prilosec for acid reflux.  She is taking Tessalon Perles, Singulair for her cough.  She states it is still present.    Review of Systems   Constitutional:  Negative for chills and fever.   Respiratory:  Positive for cough and shortness of breath. Negative for chest tightness.    Cardiovascular:  Negative for chest pain, palpitations and leg swelling.   Gastrointestinal:  Negative for abdominal pain, blood in stool and diarrhea.   Endocrine: Negative for polydipsia and polyuria.   Genitourinary:  Negative for dysuria and hematuria.   Neurological:  Positive for light-headedness. Negative for dizziness and syncope.           Past Medical History:   Diagnosis Date    Abnormal stress test 8/5/2020    Acute respiratory failure with hypoxia and hypercapnia 11/29/2017    Angina pectoris     Arthritis     Bell's palsy     left facial weakness    Breast cancer     RIGHT    CAD (coronary artery disease)     Cervical cancer     Chronic bronchitis     Chronic heart failure with preserved ejection fraction 8/5/2020    Chronic heart failure with preserved ejection fraction 8/5/2020    COPD (chronic obstructive pulmonary disease)     Dr. Sterling Krueger  bridge present     Emphysema of lung     H/O colonoscopy 06/2017    due for repeat colonsocopy in 6/2018    History of Bell's palsy     History of heart artery stent     Dr. Ortiz  x2 stents    Hyperlipidemia     Hypertension     Lung cancer     Myocardial infarction     SUNITHA (obstructive sleep apnea)     intolerant to mask    SUNITHA (obstructive sleep apnea)     intolerant to mask     Pneumonia     Pneumonia due to other staphylococcus     PUD (peptic ulcer disease)     Severe anemia 6/11/2018    Sleep apnea     Vaginal delivery     x1      Past Surgical History:   Procedure Laterality Date    ADENOIDECTOMY      BREAST BIOPSY Right     x3    BREAST LUMPECTOMY      BREAST SURGERY      lumpectomy right side     CERVIX SURGERY      cone    COLONOSCOPY N/A 3/17/2017    Procedure: COLONOSCOPY;  Surgeon: Julio Rudd MD;  Location: [facility] ENDO;  Service: Endoscopy;  Laterality: N/A;    COLONOSCOPY N/A 6/30/2017    Procedure: COLONOSCOPY;  Surgeon: Julio Rudd MD;  Location: [facility] ENDO;  Service: Endoscopy;  Laterality: N/A;    COLONOSCOPY N/A 11/28/2018    Procedure: COLONOSCOPY;  Surgeon: Nura Urbina MD;  Location: [facility] ENDO;  Service: Endoscopy;  Laterality: N/A;    COLONOSCOPY N/A 1/29/2019    Procedure: COLONOSCOPY;  Surgeon: Emmanuel Perez MD;  Location: [facility] ENDO;  Service: Endoscopy;  Laterality: N/A;  confirmed appt-SP    COLONOSCOPY N/A 7/26/2022    Procedure: COLONOSCOPY;  Surgeon: James Healy MD;  Location: [facility] ENDO;  Service: Endoscopy;  Laterality: N/A;  fully vaccinated -sm  mediport in chest -     CORONARY ANGIOPLASTY WITH STENT PLACEMENT      ESOPHAGOGASTRODUODENOSCOPY N/A 1/25/2021    Procedure: EGD (ESOPHAGOGASTRODUODENOSCOPY);  Surgeon: Dmitry Gonzalez MD;  Location: [facility] ENDO;  Service: Endoscopy;  Laterality: N/A;  rapid test >50 miles -ml    EYE SURGERY Bilateral 06/08/2018    cataract     LEFT HEART CATHETERIZATION Left 8/13/2020    Procedure: Left heart cath, rra, noon;  Surgeon: Guevara STEWARD  "MD Nafisa;  Location: [facility] CATH LAB;  Service: Cardiology;  Laterality: Left;  RN PREOP 2020---COVID NEGATIVE ON     PORTACATH PLACEMENT Right 2018    sweat glands axillary regions Bilateral     TONSILLECTOMY       Family History   Problem Relation Age of Onset    Cancer Mother         breast    Heart disease Mother     Breast cancer Mother     Cancer Father         lung-smoker     Cancer Sister         lung-smoker     Heart attack Sister     Cancer Maternal Grandmother     Heart disease Maternal Grandmother     Cancer Maternal Grandfather     Heart disease Maternal Grandfather     Cancer Paternal Grandmother     Cancer Paternal Grandfather     Cancer Sister         mets not sure where it started     COPD Sister     Heart disease Sister     Kidney cancer Son     Colon cancer Neg Hx     Esophageal cancer Neg Hx      Social History     Socioeconomic History    Marital status: Single   Tobacco Use    Smoking status: Former     Packs/day: 0.25     Years: 50.00     Pack years: 12.50     Types: Cigarettes     Quit date: 2017     Years since quittin.9    Smokeless tobacco: Never    Tobacco comments:     7 years since smoked    Substance and Sexual Activity    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     Appearance: She is well-developed. She is not diaphoretic.   HENT:      Head: Normocephalic and atraumatic.   Eyes:      Conjunctiva/sclera: Conjunctivae normal.   Neck:      Vascular: No carotid bruit.   Cardiovascular:      Rate and Rhythm: Normal rate and regular rhythm.      Heart sounds: Normal heart sounds. No murmur heard.    No friction rub. No gallop.   Pulmonary:     "  Effort: Pulmonary effort is normal. No respiratory distress.      Breath sounds: Normal breath sounds. No stridor. No wheezing, rhonchi or rales.   Musculoskeletal:      Cervical back: Normal range of motion and neck supple.      Right lower leg: No edema.      Left lower leg: No edema.   Neurological:      Mental Status: She is alert and oriented to person, place, and time.   Psychiatric:         Mood and Affect: Mood normal.         Behavior: Behavior normal.       Assessment:       Problem List Items Addressed This Visit       Hyperlipidemia LDL goal <70 (Chronic)    Relevant Orders    Lipid Panel    Atherosclerosis of aorta (Chronic)    Relevant Orders    Lipid Panel    Hypertension    Coronary artery disease of native artery of native heart with stable angina pectoris    Relevant Orders    Lipid Panel    Chronic heart failure with preserved ejection fraction    Relevant Orders    Lipid Panel     Other Visit Diagnoses       Post-menopausal    -  Primary    Relevant Orders    DXA Bone Density Spine And Hip    Allergic sinusitis        Relevant Medications    levocetirizine (XYZAL) 5 MG tablet    Gastroesophageal reflux disease with esophagitis, unspecified whether hemorrhage        Relevant Medications    pantoprazole (PROTONIX) 40 MG tablet              Plan:       Ade was seen today for follow-up.    Diagnoses and all orders for this visit:    Post-menopausal  -     DXA Bone Density Spine And Hip; Future    Chronic heart failure with preserved ejection fraction  -     Lipid Panel; Future    Atherosclerosis of aorta  -     Lipid Panel; Future    Hyperlipidemia LDL goal <70  -     Lipid Panel; Future    Coronary artery disease of native artery of native heart with stable angina pectoris  -     Lipid Panel; Future    Primary hypertension    Allergic sinusitis  -     levocetirizine (XYZAL) 5 MG tablet; Take 1 tablet (5 mg total) by mouth every evening.  Her cough sounds like a postnasal drip.  Will give trial  of Xyzal.  Hold Singulair.    Gastroesophageal reflux disease with esophagitis, unspecified whether hemorrhage  -     pantoprazole (PROTONIX) 40 MG tablet; Take 1 tablet (40 mg total) by mouth once daily.  .  PERRLA 2nd start Protonix for better of reflux control as this may be a cook secondary cause of her cough.  Other orders  -     Influenza - High Dose (65+) (PF) (IM)

## 2022-10-17 NOTE — PROGRESS NOTES
Subjective:       Patient ID: Ade Castellano is a 77 y.o. female.    Chief Complaint: Follow-up    77 yfea rold female is her for follow up. She has hypertension and CAD. She is s/p  breast and lung cancer. She is doing well. She has to take magnesium and potassium pille every now and then due to deficiencies, but otherwise has no issues. She is being followed by Dr. Holliday.  She states  she is no longer on chemotherapy. She states they are watching a spot on her lung, but it is believed to be scarring from pneumonia. She is exercing and cutting her grass again.  She admits to a dry, nonproductive cough.  She has mild AS reflux as well.  She does have some issues with swallowing and is scheduled to see a gastroenterologist to have her esophagus dilated.  She is currently on Prilosec for acid reflux.  She is taking Tessalon Perles, Singulair for her cough.  She states it is still present.    Review of Systems   Constitutional:  Negative for chills and fever.   Respiratory:  Positive for cough and shortness of breath. Negative for chest tightness.    Cardiovascular:  Negative for chest pain, palpitations and leg swelling.   Gastrointestinal:  Negative for abdominal pain, blood in stool and diarrhea.   Endocrine: Negative for polydipsia and polyuria.   Genitourinary:  Negative for dysuria and hematuria.   Neurological:  Positive for light-headedness. Negative for dizziness and syncope.           Past Medical History:   Diagnosis Date    Abnormal stress test 8/5/2020    Acute respiratory failure with hypoxia and hypercapnia 11/29/2017    Angina pectoris     Arthritis     Bell's palsy     left facial weakness    Breast cancer     RIGHT    CAD (coronary artery disease)     Cervical cancer     Chronic bronchitis     Chronic heart failure with preserved ejection fraction 8/5/2020    Chronic heart failure with preserved ejection fraction 8/5/2020    COPD (chronic obstructive pulmonary disease)     Dr. Sterling Krueger  bridge present     Emphysema of lung     H/O colonoscopy 06/2017    due for repeat colonsocopy in 6/2018    History of Bell's palsy     History of heart artery stent     Dr. Ortiz  x2 stents    Hyperlipidemia     Hypertension     Lung cancer     Myocardial infarction     SUNITHA (obstructive sleep apnea)     intolerant to mask    SUNITHA (obstructive sleep apnea)     intolerant to mask     Pneumonia     Pneumonia due to other staphylococcus     PUD (peptic ulcer disease)     Severe anemia 6/11/2018    Sleep apnea     Vaginal delivery     x1      Past Surgical History:   Procedure Laterality Date    ADENOIDECTOMY      BREAST BIOPSY Right     x3    BREAST LUMPECTOMY      BREAST SURGERY      lumpectomy right side     CERVIX SURGERY      cone    COLONOSCOPY N/A 3/17/2017    Procedure: COLONOSCOPY;  Surgeon: Julio Rudd MD;  Location: [facility] ENDO;  Service: Endoscopy;  Laterality: N/A;    COLONOSCOPY N/A 6/30/2017    Procedure: COLONOSCOPY;  Surgeon: Julio Rudd MD;  Location: [facility] ENDO;  Service: Endoscopy;  Laterality: N/A;    COLONOSCOPY N/A 11/28/2018    Procedure: COLONOSCOPY;  Surgeon: Nura Urbina MD;  Location: [facility] ENDO;  Service: Endoscopy;  Laterality: N/A;    COLONOSCOPY N/A 1/29/2019    Procedure: COLONOSCOPY;  Surgeon: Emmanuel Perez MD;  Location: [facility] ENDO;  Service: Endoscopy;  Laterality: N/A;  confirmed appt-SP    COLONOSCOPY N/A 7/26/2022    Procedure: COLONOSCOPY;  Surgeon: James Healy MD;  Location: [facility] ENDO;  Service: Endoscopy;  Laterality: N/A;  fully vaccinated -sm  mediport in chest -     CORONARY ANGIOPLASTY WITH STENT PLACEMENT      ESOPHAGOGASTRODUODENOSCOPY N/A 1/25/2021    Procedure: EGD (ESOPHAGOGASTRODUODENOSCOPY);  Surgeon: Dmitry Gonzalez MD;  Location: [facility] ENDO;  Service: Endoscopy;  Laterality: N/A;  rapid test >50 miles -ml    EYE SURGERY Bilateral 06/08/2018    cataract     LEFT HEART CATHETERIZATION Left 8/13/2020    Procedure: Left heart cath, rra, noon;  Surgeon: Guevara STEWARD  "MD Nafisa;  Location: [facility] CATH LAB;  Service: Cardiology;  Laterality: Left;  RN PREOP 2020---COVID NEGATIVE ON     PORTACATH PLACEMENT Right 2018    sweat glands axillary regions Bilateral     TONSILLECTOMY       Family History   Problem Relation Age of Onset    Cancer Mother         breast    Heart disease Mother     Breast cancer Mother     Cancer Father         lung-smoker     Cancer Sister         lung-smoker     Heart attack Sister     Cancer Maternal Grandmother     Heart disease Maternal Grandmother     Cancer Maternal Grandfather     Heart disease Maternal Grandfather     Cancer Paternal Grandmother     Cancer Paternal Grandfather     Cancer Sister         mets not sure where it started     COPD Sister     Heart disease Sister     Kidney cancer Son     Colon cancer Neg Hx     Esophageal cancer Neg Hx      Social History     Socioeconomic History    Marital status: Single   Tobacco Use    Smoking status: Former     Packs/day: 0.25     Years: 50.00     Pack years: 12.50     Types: Cigarettes     Quit date: 2017     Years since quittin.9    Smokeless tobacco: Never    Tobacco comments:     7 years since smoked    Substance and Sexual Activity    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     Appearance: She is well-developed. She is not diaphoretic.   HENT:      Head: Normocephalic and atraumatic.   Eyes:      Conjunctiva/sclera: Conjunctivae normal.   Neck:      Vascular: No carotid bruit.   Cardiovascular:      Rate and Rhythm: Normal rate and regular rhythm.      Heart sounds: Normal heart sounds. No murmur heard.    No friction rub. No gallop.   Pulmonary:     "  Effort: Pulmonary effort is normal. No respiratory distress.      Breath sounds: Normal breath sounds. No stridor. No wheezing, rhonchi or rales.   Musculoskeletal:      Cervical back: Normal range of motion and neck supple.      Right lower leg: No edema.      Left lower leg: No edema.   Neurological:      Mental Status: She is alert and oriented to person, place, and time.   Psychiatric:         Mood and Affect: Mood normal.         Behavior: Behavior normal.       Assessment:       Problem List Items Addressed This Visit       Hyperlipidemia LDL goal <70 (Chronic)    Relevant Orders    Lipid Panel    Atherosclerosis of aorta (Chronic)    Relevant Orders    Lipid Panel    Hypertension    Coronary artery disease of native artery of native heart with stable angina pectoris    Relevant Orders    Lipid Panel    Chronic heart failure with preserved ejection fraction    Relevant Orders    Lipid Panel     Other Visit Diagnoses       Post-menopausal    -  Primary    Relevant Orders    DXA Bone Density Spine And Hip    Allergic sinusitis        Relevant Medications    levocetirizine (XYZAL) 5 MG tablet    Gastroesophageal reflux disease with esophagitis, unspecified whether hemorrhage        Relevant Medications    pantoprazole (PROTONIX) 40 MG tablet              Plan:       Ade was seen today for follow-up.    Diagnoses and all orders for this visit:    Post-menopausal  -     DXA Bone Density Spine And Hip; Future    Chronic heart failure with preserved ejection fraction  -     Lipid Panel; Future    Atherosclerosis of aorta  -     Lipid Panel; Future    Hyperlipidemia LDL goal <70  -     Lipid Panel; Future    Coronary artery disease of native artery of native heart with stable angina pectoris  -     Lipid Panel; Future    Primary hypertension    Allergic sinusitis  -     levocetirizine (XYZAL) 5 MG tablet; Take 1 tablet (5 mg total) by mouth every evening.  Her cough sounds like a postnasal drip.  Will give trial  of Xyzal.  Hold Singulair.    Gastroesophageal reflux disease with esophagitis, unspecified whether hemorrhage  -     pantoprazole (PROTONIX) 40 MG tablet; Take 1 tablet (40 mg total) by mouth once daily.  .  PERRLA 2nd start Protonix for better of reflux control as this may be a cook secondary cause of her cough.  Other orders  -     Influenza - High Dose (65+) (PF) (IM)

## 2022-10-17 NOTE — PROGRESS NOTES
Health Maintenance Due   Topic     TETANUS VACCINE      Shingles Vaccine (1 of 2)  hx chicken pox. Notified pt can get vaccine at pharmacy      DEXA Scan  Pending order      COVID-19 Vaccine (4 - Booster for Moderna series)     Influenza Vaccine (1) Pt agree to get today

## 2022-10-17 NOTE — TELEPHONE ENCOUNTER
----- Message from Alberta Marquez sent at 10/17/2022  2:29 PM CDT -----  Name of Who is Calling:CHOLO HARRIS [[id number]]              What is the request in detail:Requesting a call back to schedule robert              Can the clinic reply by [facility]:no              [facility][phone]

## 2022-10-17 NOTE — TELEPHONE ENCOUNTER
MA spoke with patient. Mrs. Castellano is aware the endoscopy schedulers will contact her on 10/31. Reminder letter sent to patient.

## 2022-10-24 ENCOUNTER — TELEPHONE (OUTPATIENT)
Dept: FAMILY MEDICINE | Facility: CLINIC | Age: 77
End: 2022-10-24
Payer: MEDICARE

## 2022-10-24 NOTE — TELEPHONE ENCOUNTER
----- Message from Juliana Coronel sent at 10/24/2022  2:33 PM CDT -----  Type: Patient Call Back    Who called: self     What is the request in detail: pt is asking for a call back about her results     Can the clinic reply by [facility]?    Would the patient rather a call back or a response via [facility]?     Best call back number: [phone] (home)       Additional Information:

## 2022-10-25 ENCOUNTER — TELEPHONE (OUTPATIENT)
Dept: GASTROENTEROLOGY | Facility: CLINIC | Age: 77
End: 2022-10-25
Payer: MEDICARE

## 2022-10-25 NOTE — TELEPHONE ENCOUNTER
----- Message from Sharon Stevenson sent at 10/25/2022  8:06 AM CDT -----  Type: Patient Call Back        Who called: self         What is the request in detail: Pt is asking to be schedule for an appt         Can the clinic reply by [facility]? No         Would the patient rather a call back or a response via [facility]? Yes         Best call back number:[phone] (home)                 Thank You

## 2022-10-25 NOTE — TELEPHONE ENCOUNTER
Spoke with patient.   Aware she is scheduled for a PAT appointment on Monday 10/31.  She was calling today because she wanted to be sure her endoscopy appointment will be on the [facility].   Katt

## 2022-10-31 ENCOUNTER — CLINICAL SUPPORT (OUTPATIENT)
Dept: ENDOSCOPY | Facility: HOSPITAL | Age: 77
End: 2022-10-31
Attending: STUDENT IN AN ORGANIZED HEALTH CARE EDUCATION/TRAINING PROGRAM
Payer: MEDICARE

## 2022-10-31 VITALS — HEIGHT: 63 IN | WEIGHT: 157 LBS | BODY MASS INDEX: 27.82 KG/M2

## 2022-10-31 DIAGNOSIS — R13.14 PHARYNGOESOPHAGEAL DYSPHAGIA: ICD-10-CM

## 2022-11-07 ENCOUNTER — ANESTHESIA EVENT (OUTPATIENT)
Dept: ENDOSCOPY | Facility: HOSPITAL | Age: 77
End: 2022-11-07
Payer: MEDICARE

## 2022-11-07 RX ORDER — SODIUM CHLORIDE 9 MG/ML
INJECTION, SOLUTION INTRAVENOUS CONTINUOUS
Status: CANCELLED | OUTPATIENT
Start: 2022-11-07

## 2022-11-08 ENCOUNTER — HOSPITAL ENCOUNTER (OUTPATIENT)
Facility: HOSPITAL | Age: 77
Discharge: HOME | End: 2022-11-08
Attending: INTERNAL MEDICINE | Admitting: INTERNAL MEDICINE
Payer: MEDICARE

## 2022-11-08 ENCOUNTER — ANESTHESIA (OUTPATIENT)
Dept: ENDOSCOPY | Facility: HOSPITAL | Age: 77
End: 2022-11-08
Payer: MEDICARE

## 2022-11-08 VITALS
OXYGEN SATURATION: 98 % | DIASTOLIC BLOOD PRESSURE: 58 MMHG | HEART RATE: 53 BPM | SYSTOLIC BLOOD PRESSURE: 111 MMHG | TEMPERATURE: 98 F | RESPIRATION RATE: 18 BRPM

## 2022-11-08 DIAGNOSIS — R13.14 PHARYNGOESOPHAGEAL DYSPHAGIA: ICD-10-CM

## 2022-11-08 PROCEDURE — 88305 TISSUE EXAM BY PATHOLOGIST: CPT | Mod: 26,,, | Performed by: PATHOLOGY

## 2022-11-08 PROCEDURE — D9220A PRA ANESTHESIA: Mod: ANES,,, | Performed by: ANESTHESIOLOGY

## 2022-11-08 PROCEDURE — D9220A PRA ANESTHESIA: Mod: CRNA,,, | Performed by: STUDENT IN AN ORGANIZED HEALTH CARE EDUCATION/TRAINING PROGRAM

## 2022-11-08 PROCEDURE — 43248 EGD GUIDE WIRE INSERTION: CPT | Performed by: INTERNAL MEDICINE

## 2022-11-08 PROCEDURE — 37000008 HC ANESTHESIA 1ST 15 MINUTES: Performed by: INTERNAL MEDICINE

## 2022-11-08 PROCEDURE — 63600175 PHARM REV CODE 636 W HCPCS: Performed by: STUDENT IN AN ORGANIZED HEALTH CARE EDUCATION/TRAINING PROGRAM

## 2022-11-08 PROCEDURE — D9220A PRA ANESTHESIA: ICD-10-PCS | Mod: CRNA,,, | Performed by: STUDENT IN AN ORGANIZED HEALTH CARE EDUCATION/TRAINING PROGRAM

## 2022-11-08 PROCEDURE — D9220A PRA ANESTHESIA: ICD-10-PCS | Mod: ANES,,, | Performed by: ANESTHESIOLOGY

## 2022-11-08 PROCEDURE — 43248 EGD GUIDE WIRE INSERTION: CPT | Mod: ,,, | Performed by: INTERNAL MEDICINE

## 2022-11-08 PROCEDURE — 43239 EGD BIOPSY SINGLE/MULTIPLE: CPT | Mod: 59 | Performed by: INTERNAL MEDICINE

## 2022-11-08 PROCEDURE — 43239 EGD BIOPSY SINGLE/MULTIPLE: CPT | Mod: 59,,, | Performed by: INTERNAL MEDICINE

## 2022-11-08 PROCEDURE — 88305 TISSUE EXAM BY PATHOLOGIST: CPT | Performed by: PATHOLOGY

## 2022-11-08 PROCEDURE — 88305 TISSUE EXAM BY PATHOLOGIST: ICD-10-PCS | Mod: 26,,, | Performed by: PATHOLOGY

## 2022-11-08 PROCEDURE — 25000003 PHARM REV CODE 250: Performed by: STUDENT IN AN ORGANIZED HEALTH CARE EDUCATION/TRAINING PROGRAM

## 2022-11-08 PROCEDURE — 43248 PR EGD, FLEX, W/DILATION OVER GUIDEWIRE: ICD-10-PCS | Mod: ,,, | Performed by: INTERNAL MEDICINE

## 2022-11-08 PROCEDURE — 37000009 HC ANESTHESIA EA ADD 15 MINS: Performed by: INTERNAL MEDICINE

## 2022-11-08 PROCEDURE — 43239 PR EGD, FLEX, W/BIOPSY, SGL/MULTI: ICD-10-PCS | Mod: 59,,, | Performed by: INTERNAL MEDICINE

## 2022-11-08 RX ORDER — PHENYLEPHRINE HCL IN 0.9% NACL 1 MG/10 ML
SYRINGE (ML) INTRAVENOUS
Status: DISCONTINUED
Start: 2022-11-08 | End: 2022-11-08 | Stop reason: HOSPADM

## 2022-11-08 RX ORDER — LIDOCAINE HYDROCHLORIDE 20 MG/ML
INJECTION, SOLUTION EPIDURAL; INFILTRATION; INTRACAUDAL; PERINEURAL
Status: DISCONTINUED
Start: 2022-11-08 | End: 2022-11-08 | Stop reason: HOSPADM

## 2022-11-08 RX ORDER — SODIUM CHLORIDE 9 MG/ML
INJECTION, SOLUTION INTRAVENOUS CONTINUOUS
Status: CANCELLED | OUTPATIENT
Start: 2022-11-08

## 2022-11-08 RX ORDER — PROPOFOL 10 MG/ML
INJECTION, EMULSION INTRAVENOUS
Status: DISCONTINUED
Start: 2022-11-08 | End: 2022-11-08 | Stop reason: HOSPADM

## 2022-11-08 RX ORDER — LIDOCAINE HYDROCHLORIDE 20 MG/ML
INJECTION INTRAVENOUS
Status: DISCONTINUED | OUTPATIENT
Start: 2022-11-08 | End: 2022-11-08

## 2022-11-08 RX ORDER — ALBUTEROL SULFATE 90 UG/1
AEROSOL, METERED RESPIRATORY (INHALATION)
Status: DISCONTINUED
Start: 2022-11-08 | End: 2022-11-08 | Stop reason: HOSPADM

## 2022-11-08 RX ORDER — PROPOFOL 10 MG/ML
VIAL (ML) INTRAVENOUS
Status: DISCONTINUED | OUTPATIENT
Start: 2022-11-08 | End: 2022-11-08

## 2022-11-08 RX ORDER — PHENYLEPHRINE HYDROCHLORIDE 10 MG/ML
INJECTION INTRAVENOUS
Status: DISCONTINUED | OUTPATIENT
Start: 2022-11-08 | End: 2022-11-08

## 2022-11-08 RX ORDER — SODIUM CHLORIDE 9 MG/ML
INJECTION, SOLUTION INTRAVENOUS CONTINUOUS
Status: DISCONTINUED | OUTPATIENT
Start: 2022-11-08 | End: 2022-11-08 | Stop reason: HOSPADM

## 2022-11-08 RX ADMIN — PROPOFOL 60 MG: 10 INJECTION, EMULSION INTRAVENOUS at 12:11

## 2022-11-08 RX ADMIN — LIDOCAINE HYDROCHLORIDE 100 MG: 20 INJECTION, SOLUTION INTRAVENOUS at 12:11

## 2022-11-08 RX ADMIN — PROPOFOL 20 MG: 10 INJECTION, EMULSION INTRAVENOUS at 12:11

## 2022-11-08 RX ADMIN — PHENYLEPHRINE HYDROCHLORIDE 100 MCG: 10 INJECTION INTRAVENOUS at 12:11

## 2022-11-08 RX ADMIN — PROPOFOL 40 MG: 10 INJECTION, EMULSION INTRAVENOUS at 12:11

## 2022-11-08 RX ADMIN — SODIUM CHLORIDE: 0.9 INJECTION, SOLUTION INTRAVENOUS at 12:11

## 2022-11-08 NOTE — ANESTHESIA POSTPROCEDURE EVALUATION
Anesthesia Post Evaluation    Patient: Ade Castellano    Procedure(s) Performed: Procedure(s) (LRB):  EGD (ESOPHAGOGASTRODUODENOSCOPY) (N/A)    Final Anesthesia Type: general      Patient location during evaluation: GI PACU  Patient participation: Yes- Able to Participate  Level of consciousness: awake and alert  Post-procedure vital signs: reviewed and stable  Pain management: adequate  Airway patency: patent    PONV status at discharge: No PONV  Anesthetic complications: no      Cardiovascular status: blood pressure returned to baseline  Respiratory status: unassisted and spontaneous ventilation  Hydration status: euvolemic  Follow-up not needed.          Vitals Value Taken Time   /58 11/08/22 1327   Temp 36.8 °C (98.3 °F) 11/08/22 1257   Pulse 53 11/08/22 1327   Resp 18 11/08/22 1327   SpO2 98 % 11/08/22 1327         Event Time   Out of Recovery 13:25:00         Pain/Sergio Score: No data recorded

## 2022-11-08 NOTE — TRANSFER OF CARE
Anesthesia Transfer of Care Note    Patient: Ade Castellano    Procedure(s) Performed: Procedure(s) (LRB):  EGD (ESOPHAGOGASTRODUODENOSCOPY) (N/A)    Patient location: GI    Anesthesia Type: general    Transport from OR: Transported from OR on room air with adequate spontaneous ventilation    Post pain: adequate analgesia    Post assessment: no apparent anesthetic complications and tolerated procedure well    Post vital signs: stable    Level of consciousness: awake and alert    Nausea/Vomiting: no nausea/vomiting    Complications: none    Transfer of care protocol was followed      Last vitals:   Visit Vitals  BP (!) 102/57 (BP Location: Left arm, Patient Position: Lying)   Pulse (!) 55   Temp 36.8 °C (98.3 °F) (Axillary)   Resp 18   LMP  (LMP Unknown)   SpO2 99%   Breastfeeding No

## 2022-11-08 NOTE — PROVATION PATIENT INSTRUCTIONS
Discharge Summary/Instructions after an Endoscopic Procedure  Patient Name: Ade Castellano  Patient MRN: [id number]  Patient YOB: 1945  Tuesday, November 8, 2022  Tapan Stevenson MD  Dear patient,  As a result of recent federal legislation (The Federal Cures Act), you may   receive lab or pathology results from your procedure in your [facility]   account before your physician is able to contact you. Your physician or   their representative will relay the results to you with their   recommendations at their soonest availability.  Thank you,  RESTRICTIONS:  During your procedure today, you received medications for sedation.  These   medications may affect your judgment, balance and coordination.  Therefore,   for 24 hours, you have the following restrictions:   - DO NOT drive a car, operate machinery, make legal/financial decisions,   sign important papers or drink alcohol.    ACTIVITY:  Today: no heavy lifting, straining or running due to procedural   sedation/anesthesia.  The following day: return to full activity including work.  DIET:  Eat and drink normally unless instructed otherwise.     TREATMENT FOR COMMON SIDE EFFECTS:  - Mild abdominal pain, nausea, belching, bloating or excessive gas:  rest,   eat lightly and use a heating pad.  - Sore Throat: treat with throat lozenges and/or gargle with warm salt   water.  - Because air was used during the procedure, expelling large amounts of air   from your rectum or belching is normal.  - If a bowel prep was taken, you may not have a bowel movement for 1-3 days.    This is normal.  SYMPTOMS TO WATCH FOR AND REPORT TO YOUR PHYSICIAN:  1. Abdominal pain or bloating, other than gas cramps.  2. Chest pain.  3. Back pain.  4. Signs of infection such as: chills or fever occurring within 24 hours   after the procedure.  5. Rectal bleeding, which would show as bright red, maroon, or black stools.   (A tablespoon of blood from the rectum is not serious, especially if    hemorrhoids are present.)  6. Vomiting.  7. Weakness or dizziness.  GO DIRECTLY TO THE NEAREST EMERGENCY ROOM IF YOU HAVE ANY OF THE FOLLOWING:      Difficulty breathing              Chills and/or fever over 101 F   Persistent vomiting and/or vomiting blood   Severe abdominal pain   Severe chest pain   Black, tarry stools   Bleeding- more than one tablespoon   Any other symptom or condition that you feel may need urgent attention  Your doctor recommends these additional instructions:  If any biopsies were taken, your doctors clinic will contact you in 1 to 2   weeks with any results.  - Patient has a contact number available for emergencies.  The signs and   symptoms of potential delayed complications were discussed with the   patient.  Return to normal activities tomorrow.  Written discharge   instructions were provided to the patient.   - Discharge patient to home (ambulatory).   - Resume previous diet.   - Continue present medications.   - Await pathology results.   - Return to GI clinic at appointment to be scheduled. the dysphagia may be   due to motility disorder and additional workup may be helpful  For questions, problems or results please call your physician - Tapan Stevenson MD at Work:  [phone].  [facility] can be reached at [phone]     IF A COMPLICATION OR EMERGENCY SITUATION ARISES AND YOU ARE UNABLE TO REACH   YOUR PHYSICIAN - GO DIRECTLY TO THE EMERGENCY ROOM.  Tapan Stevenson MD  11/8/2022 12:58:52 PM  This report has been verified and signed electronically.  Dear patient,  As a result of recent federal legislation (The Federal Cures Act), you may   receive lab or pathology results from your procedure in your [facility]   account before your physician is able to contact you. Your physician or   their representative will relay the results to you with their   recommendations at their soonest availability.  Thank you,  PROVATION

## 2022-11-08 NOTE — ANESTHESIA PREPROCEDURE EVALUATION
11/08/2022  Ade Castellano is a 77 y.o., female.  Pre-operative evaluation for Procedure(s) (LRB):  EGD (ESOPHAGOGASTRODUODENOSCOPY) (N/A)    NPO >8  METS 1-3; LOGAN    Patient Active Problem List   Diagnosis    Coronary artery disease of native artery of native heart with stable angina pectoris    Hyperlipidemia LDL goal <70    Old myocardial infarction    Stable angina    Atherosclerosis of aorta    Prediabetes    DNR (do not resuscitate)    COPD (chronic obstructive pulmonary disease)    Squamous cell carcinoma lung    Metastatic breast cancer    Hypertension    LOGAN (dyspnea on exertion)    Chronic heart failure with preserved ejection fraction    Dysphagia    Stage 3 severe COPD by GOLD classification    Cancer of overlapping sites of lung    Secondary malignant neoplasm of unspecified site (CODE)    Dependence on supplemental oxygen    Hypokalemia    Generalized weakness    Orthostatic hypotension       Review of patient's allergies indicates:  No Known Allergies    No current facility-administered medications on file prior to encounter.     Current Outpatient Medications on File Prior to Encounter   Medication Sig Dispense Refill    acetaminophen (TYLENOL) 650 MG TbSR Take 650 mg by mouth every 8 (eight) hours.      albuterol (PROVENTIL) 2.5 mg /3 mL (0.083 %) nebulizer solution NEBULIZE 1 vial EVERY 6 HOURS AS NEEDED FOR WHEEZING 540 mL 1    albuterol (VENTOLIN HFA) 90 mcg/actuation inhaler INHALE 2 PUFF(S) BY MOUTH EVERY 4 - 6 HOURS AS NEEDED FOR SHORTNESS OF BREATH or WHEEZING 18 g 5    aspirin (ECOTRIN) 81 MG EC tablet Take 81 mg by mouth once daily.       carboxymethylcellulose (REFRESH PLUS) 0.5 % Dpet 1 drop 3 (three) times daily as needed.      fluticasone propionate (FLONASE) 50 mcg/actuation nasal spray USE TWO SPRAYS IN EACH NOSTRIL ONCE A DAY 16 g 5    isosorbide  mononitrate (IMDUR) 30 MG 24 hr tablet Take 1 tablet (30 mg total) by mouth once daily. Hold if SBP <120 30 tablet 11    levocetirizine (XYZAL) 5 MG tablet Take 1 tablet (5 mg total) by mouth every evening. 30 tablet 11    meclizine (ANTIVERT) 25 mg tablet Take 1 tablet (25 mg total) by mouth 2 (two) times daily as needed for Dizziness. 30 tablet 2    metoprolol tartrate (LOPRESSOR) 25 MG tablet       montelukast (SINGULAIR) 10 mg tablet TAKE 1 TABLET BY MOUTH EVERY EVENING (Patient not taking: Reported on 10/31/2022) 30 tablet 0    nitroGLYCERIN (NITROSTAT) 0.4 MG SL tablet PLACE 1 TAB UNDER TONGUE EVERY 5 MINUTES x 3 DOSES AS NEEDED FOR CHEST PAIN. IF NOT RESOLVED CALL 911 90 tablet 0    pantoprazole (PROTONIX) 40 MG tablet Take 1 tablet (40 mg total) by mouth once daily. 30 tablet 11    rosuvastatin (CRESTOR) 10 MG tablet TAKE 1 TABLET BY MOUTH EVERY DAY (Patient taking differently: Take 10 mg by mouth every evening.) 30 tablet 11    sodium chloride (OCEAN) 0.65 % nasal spray 1 spray by Nasal route as needed for Congestion.      TRELEGY ELLIPTA 100-62.5-25 mcg DsDv INHALE ONE PUFF INTO THE LUNGS ONCE A DAY  5       Past Surgical History:   Procedure Laterality Date    ADENOIDECTOMY      BREAST BIOPSY Right     x3    BREAST LUMPECTOMY      BREAST SURGERY      lumpectomy right side     CERVIX SURGERY      cone    COLONOSCOPY N/A 3/17/2017    Procedure: COLONOSCOPY;  Surgeon: Julio Rudd MD;  Location: [facility] ENDO;  Service: Endoscopy;  Laterality: N/A;    COLONOSCOPY N/A 6/30/2017    Procedure: COLONOSCOPY;  Surgeon: Julio Rudd MD;  Location: [facility] ENDO;  Service: Endoscopy;  Laterality: N/A;    COLONOSCOPY N/A 11/28/2018    Procedure: COLONOSCOPY;  Surgeon: Nura Urbina MD;  Location: [facility] ENDO;  Service: Endoscopy;  Laterality: N/A;    COLONOSCOPY N/A 1/29/2019    Procedure: COLONOSCOPY;  Surgeon: Emmanuel Perez MD;  Location: [facility] ENDO;  Service: Endoscopy;  Laterality: N/A;  confirmed  appt-SP    COLONOSCOPY N/A 2022    Procedure: COLONOSCOPY;  Surgeon: James Healy MD;  Location: [facility] ENDO;  Service: Endoscopy;  Laterality: N/A;  fully vaccinated -sm  mediport in chest -     CORONARY ANGIOPLASTY WITH STENT PLACEMENT      ESOPHAGOGASTRODUODENOSCOPY N/A 2021    Procedure: EGD (ESOPHAGOGASTRODUODENOSCOPY);  Surgeon: Dmitry Gonzalez MD;  Location: [facility] ENDO;  Service: Endoscopy;  Laterality: N/A;  rapid test >50 miles -ml    EYE SURGERY Bilateral 2018    cataract     LEFT HEART CATHETERIZATION Left 2020    Procedure: Left heart cath, rra, noon;  Surgeon: Guevara Skelton MD;  Location: [facility] CATH LAB;  Service: Cardiology;  Laterality: Left;  RN PREOP 2020---COVID NEGATIVE ON     PORTACATH PLACEMENT Right 2018    sweat glands axillary regions Bilateral     TONSILLECTOMY         Social History     Socioeconomic History    Marital status: Single   Tobacco Use    Smoking status: Former     Packs/day: 0.25     Years: 50.00     Pack years: 12.50     Types: Cigarettes     Quit date: 2017     Years since quittin.0    Smokeless tobacco: Never    Tobacco comments:     7 years since smoked    Substance and Sexual Activity    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     Drug use: No    Sexual activity: Not Currently       Lab Results   Component Value Date    WBC 4.53 2022    HGB 13.3 2022    HCT 40.5 2022    MCV 91 2022     2022       CMP  Sodium   Date Value Ref Range Status   2022 140 136 - 145 mmol/L Final     Potassium   Date Value Ref Range Status   2022 3.4 (L) 3.5 - 5.1 mmol/L Final     Chloride   Date Value Ref Range Status   2022 108 95 - 110 mmol/L Final     CO2   Date Value Ref Range Status   2022 22 (L) 23 - 29 mmol/L Final     Glucose   Date Value Ref Range Status   2022 123 (H) 70 - 110 mg/dL Final     BUN   Date Value Ref Range Status   2022 18 8 - 23 mg/dL Final      Creatinine   Date Value Ref Range Status   2022 1.0 0.5 - 1.4 mg/dL Final     Calcium   Date Value Ref Range Status   2022 9.3 8.7 - 10.5 mg/dL Final     Total Protein   Date Value Ref Range Status   2022 6.8 6.0 - 8.4 g/dL Final     Albumin   Date Value Ref Range Status   2022 3.7 3.5 - 5.2 g/dL Final     Total Bilirubin   Date Value Ref Range Status   2022 0.4 0.1 - 1.0 mg/dL Final     Comment:     For infants and newborns, interpretation of results should be based  on gestational age, weight and in agreement with clinical  observations.    Premature Infant recommended reference ranges:  Up to 24 hours.............<8.0 mg/dL  Up to 48 hours............<12.0 mg/dL  3-5 days..................<15.0 mg/dL  6-29 days.................<15.0 mg/dL       Alkaline Phosphatase   Date Value Ref Range Status   2022 51 (L) 55 - 135 U/L Final     AST   Date Value Ref Range Status   2022 15 10 - 40 U/L Final     ALT   Date Value Ref Range Status   2022 14 10 - 44 U/L Final     Anion Gap   Date Value Ref Range Status   2022 10 8 - 16 mmol/L Final     eGFR if    Date Value Ref Range Status   2022 >60 >60 mL/min/1.73 m^2 Final     eGFR if non    Date Value Ref Range Status   2022 >60 >60 mL/min/1.73 m^2 Final     Comment:     Calculation used to obtain the estimated glomerular filtration  rate (eGFR) is the CKD-EPI equation.              Diagnostic Studies:      EKD Echo:  Results for orders placed or performed during the hospital encounter of 17   2D echo with color flow doppler   Result Value Ref Range    EF + QEF 60 55 - 65    Aortic Valve Stenosis TRIVIAL TO MILD     Est. PA Systolic Pressure 54.44 (A)     Pericardial Effusion NONE     Mitral Valve Mobility NORMAL     Tricuspid Valve Regurgitation MILD            Pre-op Assessment    I have reviewed the Patient Summary Reports.    I have reviewed the NPO Status.    I have reviewed the Medications.     Review of Systems  Anesthesia Hx:  No problems with previous Anesthesia  History of prior surgery of interest to airway management or planning:  Denies Personal Hx of Anesthesia complications.   Social:  Former Smoker    Hematology/Oncology:         -- Anemia: --  Cancer in past history:  surgery and chemotherapy  Oncology Comments: Lung, cervical     Cardiovascular:   Exercise tolerance: poor Hypertension Past MI CAD  CABG/stent   Denies Angina. LOGAN ECG has been reviewed.    Pulmonary:   COPD Sleep Apnea    Education provided regarding risk of obstructive sleep apnea     Renal/:   Chronic Renal Disease    Hepatic/GI:   PUD,    Neurological:   Neuromuscular Disease,        Physical Exam  General: Cooperative, Alert and Oriented    Airway:  Mallampati: III   Mouth Opening: Normal  TM Distance: Normal  Tongue: Normal    Dental:  Intact, Dentures, Partial Dentures        Anesthesia Plan  Type of Anesthesia, risks & benefits discussed:    Anesthesia Type: Gen Natural Airway, Gen ETT  Intra-op Monitoring Plan: Standard ASA Monitors  Induction:  IV  Informed Consent: Informed consent signed with the Patient and all parties understand the risks and agree with anesthesia plan.  All questions answered.   ASA Score: 3    Ready For Surgery From Anesthesia Perspective.     .

## 2022-11-08 NOTE — PLAN OF CARE
Patient alert, oriented, and steady on feet.  Patient denies any pain or discomfort at this time.  Patient has her discharge instructions and verbalizes understanding.  Patient has all of her belongings.  Patient discharged home with valeria Polanco.

## 2022-11-10 LAB
FINAL PATHOLOGIC DIAGNOSIS: NORMAL
GROSS: NORMAL
Lab: NORMAL

## 2022-11-16 ENCOUNTER — LAB VISIT (OUTPATIENT)
Dept: LAB | Facility: HOSPITAL | Age: 77
End: 2022-11-16
Attending: INTERNAL MEDICINE
Payer: MEDICARE

## 2022-11-16 ENCOUNTER — OFFICE VISIT (OUTPATIENT)
Dept: HEMATOLOGY/ONCOLOGY | Facility: CLINIC | Age: 77
End: 2022-11-16
Payer: MEDICARE

## 2022-11-16 ENCOUNTER — INFUSION (OUTPATIENT)
Dept: INFUSION THERAPY | Facility: HOSPITAL | Age: 77
End: 2022-11-16
Attending: INTERNAL MEDICINE
Payer: MEDICARE

## 2022-11-16 DIAGNOSIS — R94.2 ABNORMAL PET SCAN OF LUNG: ICD-10-CM

## 2022-11-16 DIAGNOSIS — C34.90 SQUAMOUS CELL CARCINOMA LUNG: Primary | ICD-10-CM

## 2022-11-16 DIAGNOSIS — R94.6 ABNORMAL RESULTS OF THYROID FUNCTION STUDIES: ICD-10-CM

## 2022-11-16 DIAGNOSIS — Z85.118 HISTORY OF LUNG CANCER: ICD-10-CM

## 2022-11-16 DIAGNOSIS — R93.89 ABNORMAL CT SCAN: ICD-10-CM

## 2022-11-16 DIAGNOSIS — E03.2 HYPOTHYROIDISM DUE TO DRUGS: ICD-10-CM

## 2022-11-16 DIAGNOSIS — J44.9 CHRONIC OBSTRUCTIVE PULMONARY DISEASE, UNSPECIFIED COPD TYPE: ICD-10-CM

## 2022-11-16 DIAGNOSIS — C50.919 RECURRENT BREAST CANCER, UNSPECIFIED LATERALITY: ICD-10-CM

## 2022-11-16 DIAGNOSIS — C50.919 RECURRENT BREAST CANCER, UNSPECIFIED LATERALITY: Primary | ICD-10-CM

## 2022-11-16 LAB
ALBUMIN SERPL BCP-MCNC: 3.4 G/DL (ref 3.5–5.2)
ALP SERPL-CCNC: 73 U/L (ref 55–135)
ALT SERPL W/O P-5'-P-CCNC: 14 U/L (ref 10–44)
ANION GAP SERPL CALC-SCNC: 13 MMOL/L (ref 8–16)
AST SERPL-CCNC: 14 U/L (ref 10–40)
BASOPHILS # BLD AUTO: 0.07 K/UL (ref 0–0.2)
BASOPHILS NFR BLD: 1.2 % (ref 0–1.9)
BILIRUB SERPL-MCNC: 0.3 MG/DL (ref 0.1–1)
BUN SERPL-MCNC: 16 MG/DL (ref 8–23)
CALCIUM SERPL-MCNC: 9.5 MG/DL (ref 8.7–10.5)
CHLORIDE SERPL-SCNC: 106 MMOL/L (ref 95–110)
CO2 SERPL-SCNC: 22 MMOL/L (ref 23–29)
CREAT SERPL-MCNC: 0.9 MG/DL (ref 0.5–1.4)
DIFFERENTIAL METHOD: ABNORMAL
EOSINOPHIL # BLD AUTO: 0.2 K/UL (ref 0–0.5)
EOSINOPHIL NFR BLD: 4 % (ref 0–8)
ERYTHROCYTE [DISTWIDTH] IN BLOOD BY AUTOMATED COUNT: 13.5 % (ref 11.5–14.5)
EST. GFR  (NO RACE VARIABLE): >60 ML/MIN/1.73 M^2
GLUCOSE SERPL-MCNC: 134 MG/DL (ref 70–110)
HCT VFR BLD AUTO: 39.4 % (ref 37–48.5)
HGB BLD-MCNC: 12.4 G/DL (ref 12–16)
IMM GRANULOCYTES # BLD AUTO: 0.03 K/UL (ref 0–0.04)
IMM GRANULOCYTES NFR BLD AUTO: 0.5 % (ref 0–0.5)
LYMPHOCYTES # BLD AUTO: 1.2 K/UL (ref 1–4.8)
LYMPHOCYTES NFR BLD: 20 % (ref 18–48)
MAGNESIUM SERPL-MCNC: 1.6 MG/DL (ref 1.6–2.6)
MCH RBC QN AUTO: 28.6 PG (ref 27–31)
MCHC RBC AUTO-ENTMCNC: 31.5 G/DL (ref 32–36)
MCV RBC AUTO: 91 FL (ref 82–98)
MONOCYTES # BLD AUTO: 0.5 K/UL (ref 0.3–1)
MONOCYTES NFR BLD: 8.4 % (ref 4–15)
NEUTROPHILS # BLD AUTO: 4 K/UL (ref 1.8–7.7)
NEUTROPHILS NFR BLD: 65.9 % (ref 38–73)
NRBC BLD-RTO: 0 /100 WBC
PLATELET # BLD AUTO: 295 K/UL (ref 150–450)
PMV BLD AUTO: 9.8 FL (ref 9.2–12.9)
POTASSIUM SERPL-SCNC: 3.3 MMOL/L (ref 3.5–5.1)
PROT SERPL-MCNC: 7.1 G/DL (ref 6–8.4)
RBC # BLD AUTO: 4.34 M/UL (ref 4–5.4)
SODIUM SERPL-SCNC: 141 MMOL/L (ref 136–145)
T4 FREE SERPL-MCNC: 0.96 NG/DL (ref 0.71–1.51)
TSH SERPL DL<=0.005 MIU/L-ACNC: 2.14 UIU/ML (ref 0.4–4)
WBC # BLD AUTO: 6.05 K/UL (ref 3.9–12.7)

## 2022-11-16 PROCEDURE — 1157F PR ADVANCE CARE PLAN OR EQUIV PRESENT IN MEDICAL RECORD: ICD-10-PCS | Mod: CPTII,95,, | Performed by: INTERNAL MEDICINE

## 2022-11-16 PROCEDURE — 85025 COMPLETE CBC W/AUTO DIFF WBC: CPT | Performed by: INTERNAL MEDICINE

## 2022-11-16 PROCEDURE — 99443 PR PHYSICIAN TELEPHONE EVALUATION 21-30 MIN: ICD-10-PCS | Mod: 95,,, | Performed by: INTERNAL MEDICINE

## 2022-11-16 PROCEDURE — 25000003 PHARM REV CODE 250: Performed by: INTERNAL MEDICINE

## 2022-11-16 PROCEDURE — 1157F ADVNC CARE PLAN IN RCRD: CPT | Mod: CPTII,95,, | Performed by: INTERNAL MEDICINE

## 2022-11-16 PROCEDURE — A4216 STERILE WATER/SALINE, 10 ML: HCPCS | Performed by: INTERNAL MEDICINE

## 2022-11-16 PROCEDURE — 99443 PR PHYSICIAN TELEPHONE EVALUATION 21-30 MIN: CPT | Mod: 95,,, | Performed by: INTERNAL MEDICINE

## 2022-11-16 PROCEDURE — 63600175 PHARM REV CODE 636 W HCPCS: Performed by: INTERNAL MEDICINE

## 2022-11-16 PROCEDURE — 84443 ASSAY THYROID STIM HORMONE: CPT | Performed by: INTERNAL MEDICINE

## 2022-11-16 PROCEDURE — 36415 COLL VENOUS BLD VENIPUNCTURE: CPT | Performed by: INTERNAL MEDICINE

## 2022-11-16 PROCEDURE — 96523 IRRIG DRUG DELIVERY DEVICE: CPT

## 2022-11-16 PROCEDURE — 83735 ASSAY OF MAGNESIUM: CPT | Performed by: INTERNAL MEDICINE

## 2022-11-16 PROCEDURE — 84439 ASSAY OF FREE THYROXINE: CPT | Performed by: INTERNAL MEDICINE

## 2022-11-16 PROCEDURE — 80053 COMPREHEN METABOLIC PANEL: CPT | Performed by: INTERNAL MEDICINE

## 2022-11-16 RX ORDER — HEPARIN 100 UNIT/ML
500 SYRINGE INTRAVENOUS
Status: DISCONTINUED | OUTPATIENT
Start: 2022-11-16 | End: 2022-11-16 | Stop reason: HOSPADM

## 2022-11-16 RX ORDER — SODIUM CHLORIDE 0.9 % (FLUSH) 0.9 %
10 SYRINGE (ML) INJECTION
Status: DISCONTINUED | OUTPATIENT
Start: 2022-11-16 | End: 2022-11-16 | Stop reason: HOSPADM

## 2022-11-16 RX ORDER — HEPARIN 100 UNIT/ML
500 SYRINGE INTRAVENOUS
Status: CANCELLED | OUTPATIENT
Start: 2022-11-16

## 2022-11-16 RX ORDER — SODIUM CHLORIDE 0.9 % (FLUSH) 0.9 %
10 SYRINGE (ML) INJECTION
Status: CANCELLED | OUTPATIENT
Start: 2022-11-16

## 2022-11-16 RX ADMIN — Medication 10 ML: at 08:11

## 2022-11-16 RX ADMIN — HEPARIN 500 UNITS: 100 SYRINGE at 08:11

## 2022-11-16 NOTE — PLAN OF CARE
Patient arrived to unit for maintenance port flush. Patient was accessed, flushed with saline, blood return was noted, heparin locked and de-accessed. Tolerated well. Discharged off unit to next appointment with Dr. Holliday.

## 2022-11-16 NOTE — PROGRESS NOTES
This service was not originating from a related E/M service provided within the previous 7 days nor will  to an E/M service or procedure within the next 24 hours or my soonest available appointment.  Prevailing standard of care was able to be met in this audio-only visit.

## 2022-11-16 NOTE — PROGRESS NOTES
Established Patient - Audio Only Telehealth Visit     The patient location is: clinic  The chief complaint leading to consultation is: f/u recurrent breast ca  Visit type: Virtual visit with audio only (telephone)  Total time spent with patient: 25 min     The reason for the audio only service rather than synchronous audio and video virtual visit was related to technical difficulties or patient preference/necessity.     Each patient to whom I provide medical services by telemedicine is:  (1) informed of the relationship between the physician and patient and the respective role of any other health care provider with respect to management of the patient; and (2) notified that they may decline to receive medical services by telemedicine and may withdraw from such care at any time. Patient verbally consented to receive this service via voice-only telephone call.       HPI: History of Stage 1A  pT1c  pN0 cM0 Rt breast cancer Grade 3, ER/CT neg , Her 2 jose maria neg s/p lumpectomy 7/11/2014 and s/p adjuvant RT 9/2014   She completed post operative radiation therapy at 400 cGy per fraction to 4000 cGy 9/2014    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 2/14/2018 PD-L1 10% lowEGFR NEG ALK NEG BRAF NEG  s/p  cycle 6 of carboplatin/Abraxane 7/2/2018   Recurrent breast cancer diagnosed 3/2019  She is s/p AC q21d x 4 cycles completed 9/6/2019   She  completed  RT 12/16/2019 The right axilla and right supraclavicular region was treated at 200 cGy per fraction to 4400 cGy. The PET(+) disease in the right axilla and right supraclavicular fossa will be boosted at 200 cGy per fraction to 6000 cGy total dose.  S/p LYMPH NODE, RIGHT AXILLA, BIOPSY: 6/16/21 . Metastatic poorly-differentiated carcinoma, c/w breast primary ERnegPRneg Her2 neg  PD-L1 (22C3) IHC CPS> is greater than or equal to 10  Pembrolizumab 7/22/21 -present     Interval Hx:S/p C16 pembrolizumab 6/15/22  ( 400mg q6wks)  Continues with intermittently prod cough  Chronic  LOGAN-stabe  No fevers  No fatigue  Appetite and weight stable  She has supp 02 at home       PET /CT 7/13/22 shows  new left lower lobe opacification with mild radiotracer uptake which may represent aspiration, pneumonia, or malignancy.  Tissue sampling would be required for definitive diagnosis.     She is followed by Dr. Katz, pulmonology  Follow-up PFT's planned     She also has been followed by GI for dysphagia  In past  She has undergone dilation        Last Mammo 6/16/21  No mammographic evidence of malignancy.BI-RADS Category 1: Negative      PET/CT 1/26/22 No definite evidence of hypermetabolic tumor.  Interval resolution of hypermetabolic right axillary lymph nodes.  No new hypermetabolic findings.              Assessment and plan:  1. Recurrent breast cancer, unspecified laterality   S/p C16 pembrolizumab 6/15/22   Last  PET/CT imaging shows  new left lower lobe opacification with mild radiotracer uptake which may represent aspiration, pneumonia, or malignancy.  Recent follow-up imaging studies decreasing patchy pulmonary consolidation within the left lower lobe when compared to prior PET-CT scan dated 07/13/2022.  Plan to remain off of therapy   Plan follow-up imaging studies  - CT Chest With Contrast; Future  - NM PET CT Routine Skull to Mid Thigh; Future  - CBC Auto Differential; Future  - Comprehensive Metabolic Panel; Future    2. History of lung cancer  Repeat lung bx 2/14/2018 revealed Squamous Cell CA lung PD-L1 10% EGFR NEGALK NEG  Pt treated for advanced squamous cell CA of lung   She s/p  cycle 6 of carboplatin/Abraxane Day1 completed 7/2/2018 ( day 15 held due to prolonged cytopenias)  She completed therapy with near CR    3. Chronic obstructive pulmonary disease, unspecified COPD type  F/b Pulmonology  Repeat PFT's planned    4. Abnormal PET scan of lung  Plan folllow-up imaging studies    5. Abnormal CT scan  -NM PET CT Routine Skull to Mid Thigh; Future          Schedule CT chest this week    PET/Ct prior to f/u   F/u mid dec with cbc,cmp prior to fu                       This service was not originating from a related E/M service provided within the previous 7 days nor will  to an E/M service or procedure within the next 24 hours or my soonest available appointment.  Prevailing standard of care was able to be met in this audio-only visit.

## 2022-11-16 NOTE — PLAN OF CARE
Problem: Fatigue  Goal: Improved Activity Tolerance  Outcome: Ongoing, Progressing     Problem: Anemia (Chemotherapy Effects)  Goal: Anemia Symptom Improvement  Outcome: Ongoing, Progressing     Problem: Urinary Bleeding Risk or Actual (Chemotherapy Effects)  Goal: Absence of Hematuria  Outcome: Ongoing, Progressing     Problem: Nausea and Vomiting (Chemotherapy Effects)  Goal: Fluid and Electrolyte Balance  Outcome: Ongoing, Progressing     Problem: Neurotoxicity (Chemotherapy Effects)  Goal: Neurotoxicity Symptom Control  Outcome: Ongoing, Progressing     Problem: Neutropenia (Chemotherapy Effects)  Goal: Absence of Infection  Outcome: Ongoing, Progressing     Problem: Oral Mucositis (Chemotherapy Effects)  Goal: Improved Oral Mucous Membrane Integrity  Outcome: Ongoing, Progressing     Problem: Thrombocytopenia Bleeding Risk (Chemotherapy Effects)  Goal: Absence of Bleeding  Outcome: Ongoing, Progressing

## 2022-11-21 ENCOUNTER — HOSPITAL ENCOUNTER (OUTPATIENT)
Dept: RADIOLOGY | Facility: HOSPITAL | Age: 77
Discharge: HOME | End: 2022-11-21
Attending: INTERNAL MEDICINE
Payer: MEDICARE

## 2022-11-21 DIAGNOSIS — C50.919 RECURRENT BREAST CANCER, UNSPECIFIED LATERALITY: ICD-10-CM

## 2022-11-21 PROCEDURE — 71260 CT CHEST WITH CONTRAST: ICD-10-PCS | Mod: 26,,, | Performed by: RADIOLOGY

## 2022-11-21 PROCEDURE — 71260 CT THORAX DX C+: CPT | Mod: 26,,, | Performed by: RADIOLOGY

## 2022-11-21 PROCEDURE — 71260 CT THORAX DX C+: CPT | Mod: TC

## 2022-11-21 PROCEDURE — 25500020 PHARM REV CODE 255: Performed by: INTERNAL MEDICINE

## 2022-11-21 RX ADMIN — IOHEXOL 75 ML: 350 INJECTION, SOLUTION INTRAVENOUS at 07:11

## 2022-12-01 ENCOUNTER — TELEPHONE (OUTPATIENT)
Dept: HEMATOLOGY/ONCOLOGY | Facility: CLINIC | Age: 77
End: 2022-12-01
Payer: MEDICARE

## 2022-12-01 NOTE — TELEPHONE ENCOUNTER
Spoke with patient regarding rescheduling pet scan for mid January per dr. Holliday. Waiting for radiology to open January slots for pet scans for [facility].

## 2022-12-12 ENCOUNTER — TELEPHONE (OUTPATIENT)
Dept: HEMATOLOGY/ONCOLOGY | Facility: CLINIC | Age: 77
End: 2022-12-12
Payer: MEDICARE

## 2022-12-12 NOTE — TELEPHONE ENCOUNTER
----- Message from Michelle Butcher MA sent at 12/1/2022  2:20 PM CST -----  Spoke with patient regarding rescheduling pet scan for mid January per dr. Hollidya. Waiting for radiology to open January slots for pet scans for [facility].

## 2022-12-15 DIAGNOSIS — I10 PRIMARY HYPERTENSION: Primary | ICD-10-CM

## 2022-12-15 RX ORDER — METOPROLOL TARTRATE 25 MG/1
TABLET, FILM COATED ORAL
Status: CANCELLED | OUTPATIENT
Start: 2022-12-15

## 2022-12-15 NOTE — TELEPHONE ENCOUNTER
Patient is requesting a refill of her lopressor to hold her until her cardiology appt 12/22. Patient is requesting 12 pills to hold her until appt. Please advise Last office visit 10/17/2022.

## 2022-12-15 NOTE — TELEPHONE ENCOUNTER
----- Message from Ileana Hubbard sent at 12/15/2022  9:25 AM CST -----  .Type: Patient Call Back    Who called: Self    What is the request in detail: Requesting a few pills of metoprolol tartrate (LOPRESSOR) 25 MG tablet until her appointment on 12/22/2022    Can the clinic reply by [facility]? No    Would the patient rather a call back or a response via [facility]? Call    Best call back number: .[phone] (home)         Additional Information:    .  [facility] - [place], [place] - [address] [address]  [address] [address] [address]  Phone: [phone] Fax: [phone]

## 2022-12-15 NOTE — TELEPHONE ENCOUNTER
No new care gaps identified.  [facility] Embedded Care Gaps. Reference number: [id number]. 12/15/2022   2:52:20 PM CST

## 2022-12-15 NOTE — TELEPHONE ENCOUNTER
----- Message from Amanda Christa sent at 12/15/2022 12:05 PM CST -----  Contact: CHOLO HARRIS [[id number]]  Type: Call Back      Who called: CHOLO HARRIS [[id number]]      What is the request in detail: Patient is requesting a call back. Pt states that she left a message earlier in regard to her mediation. She states that it is very important.   Please advise.     Can the clinic reply by [facility]? No      Would the patient rather a call back or a response via [facility]? Call back       Best call back number: [phone]      Additional Information:

## 2022-12-15 NOTE — TELEPHONE ENCOUNTER
Call placed to patient, no answer. Message left on voicemail to call office regarding provider message below.

## 2022-12-15 NOTE — TELEPHONE ENCOUNTER
----- Message from Wendy Ritchie sent at 12/15/2022  2:59 PM CST -----  Regarding: Self/  [phone]  Type: Patient Call Back    Who called:  Patient    What is the request in detail:  Patient returned staffs call.  Thank you    Would the patient rather a call back or a response via [facility]?  Call back    Best call back number:  [phone]        Thank you

## 2022-12-15 NOTE — TELEPHONE ENCOUNTER
----- Message from Ileana Hubbard sent at 12/15/2022  9:25 AM CST -----  .Type: Patient Call Back    Who called: Self    What is the request in detail: Requesting a few pills of metoprolol tartrate (LOPRESSOR) 25 MG tablet until her appointment on 12/22/2022    Can the clinic reply by [facility]? No    Would the patient rather a call back or a response via [facility]? Call    Best call back number: .[phone] (home)         Additional Information:    .  [facility] - [place], [place] - [address] [address]  [address] [address] [address]  Phone: [phone] Fax: [phone]

## 2022-12-15 NOTE — TELEPHONE ENCOUNTER
Spoke to patient and per Dr. Huitron I explained that medication was sent to pharmacy. Patient stated good understanding

## 2022-12-16 ENCOUNTER — TELEPHONE (OUTPATIENT)
Dept: FAMILY MEDICINE | Facility: CLINIC | Age: 77
End: 2022-12-16
Payer: MEDICARE

## 2022-12-16 RX ORDER — METOPROLOL TARTRATE 25 MG/1
25 TABLET, FILM COATED ORAL 2 TIMES DAILY
Qty: 90 TABLET | Refills: 0 | OUTPATIENT
Start: 2022-12-16

## 2022-12-16 NOTE — TELEPHONE ENCOUNTER
----- Message from Bear Springer MA sent at 12/16/2022 11:57 AM CST -----  Type: Patient Call Back    Who called: Self    What is the request in detail: pt. States her medication was supposed to be sent to the pharmacy yesterday and isn't there ..     metoprolol tartrate (LOPRESSOR) 25 MG tablet    Can the clinic reply by [facility]?NO    Would the patient rather a call back or a response via [facility]? YES    Best call back number: [phone] (home)

## 2022-12-19 RX ORDER — METOPROLOL TARTRATE 25 MG/1
25 TABLET, FILM COATED ORAL 2 TIMES DAILY
Qty: 180 TABLET | Refills: 1 | Status: SHIPPED | OUTPATIENT
Start: 2022-12-19 | End: 2022-12-22 | Stop reason: SDUPTHER

## 2022-12-20 ENCOUNTER — LAB VISIT (OUTPATIENT)
Dept: LAB | Facility: HOSPITAL | Age: 77
End: 2022-12-20
Attending: INTERNAL MEDICINE
Payer: MEDICARE

## 2022-12-20 ENCOUNTER — INFUSION (OUTPATIENT)
Dept: INFUSION THERAPY | Facility: HOSPITAL | Age: 77
End: 2022-12-20
Attending: INTERNAL MEDICINE
Payer: MEDICARE

## 2022-12-20 ENCOUNTER — OFFICE VISIT (OUTPATIENT)
Dept: HEMATOLOGY/ONCOLOGY | Facility: CLINIC | Age: 77
End: 2022-12-20
Payer: MEDICARE

## 2022-12-20 VITALS
HEIGHT: 63 IN | DIASTOLIC BLOOD PRESSURE: 58 MMHG | BODY MASS INDEX: 28.24 KG/M2 | SYSTOLIC BLOOD PRESSURE: 123 MMHG | HEART RATE: 53 BPM | WEIGHT: 159.38 LBS

## 2022-12-20 VITALS
OXYGEN SATURATION: 99 % | DIASTOLIC BLOOD PRESSURE: 61 MMHG | SYSTOLIC BLOOD PRESSURE: 139 MMHG | HEART RATE: 53 BPM | TEMPERATURE: 98 F | RESPIRATION RATE: 18 BRPM

## 2022-12-20 DIAGNOSIS — C50.919 RECURRENT BREAST CANCER, UNSPECIFIED LATERALITY: Primary | ICD-10-CM

## 2022-12-20 DIAGNOSIS — Z87.01 HISTORY OF PNEUMONIA: ICD-10-CM

## 2022-12-20 DIAGNOSIS — C34.90 SQUAMOUS CELL CARCINOMA LUNG: Primary | ICD-10-CM

## 2022-12-20 DIAGNOSIS — R06.09 DOE (DYSPNEA ON EXERTION): ICD-10-CM

## 2022-12-20 DIAGNOSIS — Z85.118 HISTORY OF LUNG CANCER: ICD-10-CM

## 2022-12-20 DIAGNOSIS — J44.9 CHRONIC OBSTRUCTIVE PULMONARY DISEASE, UNSPECIFIED COPD TYPE: ICD-10-CM

## 2022-12-20 DIAGNOSIS — C50.919 RECURRENT BREAST CANCER, UNSPECIFIED LATERALITY: ICD-10-CM

## 2022-12-20 LAB
ALBUMIN SERPL BCP-MCNC: 4.1 G/DL (ref 3.5–5.2)
ALP SERPL-CCNC: 57 U/L (ref 55–135)
ALT SERPL W/O P-5'-P-CCNC: 14 U/L (ref 10–44)
ANION GAP SERPL CALC-SCNC: 8 MMOL/L (ref 8–16)
AST SERPL-CCNC: 16 U/L (ref 10–40)
BASOPHILS # BLD AUTO: 0.05 K/UL (ref 0–0.2)
BASOPHILS NFR BLD: 1.2 % (ref 0–1.9)
BILIRUB SERPL-MCNC: 0.5 MG/DL (ref 0.1–1)
BUN SERPL-MCNC: 23 MG/DL (ref 8–23)
CALCIUM SERPL-MCNC: 9.5 MG/DL (ref 8.7–10.5)
CHLORIDE SERPL-SCNC: 106 MMOL/L (ref 95–110)
CO2 SERPL-SCNC: 23 MMOL/L (ref 23–29)
CREAT SERPL-MCNC: 0.9 MG/DL (ref 0.5–1.4)
DIFFERENTIAL METHOD: NORMAL
EOSINOPHIL # BLD AUTO: 0.1 K/UL (ref 0–0.5)
EOSINOPHIL NFR BLD: 3.4 % (ref 0–8)
ERYTHROCYTE [DISTWIDTH] IN BLOOD BY AUTOMATED COUNT: 14.3 % (ref 11.5–14.5)
EST. GFR  (NO RACE VARIABLE): >60 ML/MIN/1.73 M^2
GLUCOSE SERPL-MCNC: 117 MG/DL (ref 70–110)
HCT VFR BLD AUTO: 42.5 % (ref 37–48.5)
HGB BLD-MCNC: 13.7 G/DL (ref 12–16)
IMM GRANULOCYTES # BLD AUTO: 0.02 K/UL (ref 0–0.04)
IMM GRANULOCYTES NFR BLD AUTO: 0.5 % (ref 0–0.5)
LYMPHOCYTES # BLD AUTO: 1.1 K/UL (ref 1–4.8)
LYMPHOCYTES NFR BLD: 27.4 % (ref 18–48)
MCH RBC QN AUTO: 29.2 PG (ref 27–31)
MCHC RBC AUTO-ENTMCNC: 32.2 G/DL (ref 32–36)
MCV RBC AUTO: 91 FL (ref 82–98)
MONOCYTES # BLD AUTO: 0.4 K/UL (ref 0.3–1)
MONOCYTES NFR BLD: 10.2 % (ref 4–15)
NEUTROPHILS # BLD AUTO: 2.4 K/UL (ref 1.8–7.7)
NEUTROPHILS NFR BLD: 57.3 % (ref 38–73)
NRBC BLD-RTO: 0 /100 WBC
PLATELET # BLD AUTO: 208 K/UL (ref 150–450)
PMV BLD AUTO: 9.4 FL (ref 9.2–12.9)
POTASSIUM SERPL-SCNC: 3.6 MMOL/L (ref 3.5–5.1)
PROT SERPL-MCNC: 6.9 G/DL (ref 6–8.4)
RBC # BLD AUTO: 4.69 M/UL (ref 4–5.4)
SODIUM SERPL-SCNC: 137 MMOL/L (ref 136–145)
WBC # BLD AUTO: 4.13 K/UL (ref 3.9–12.7)

## 2022-12-20 PROCEDURE — 3074F PR MOST RECENT SYSTOLIC BLOOD PRESSURE < 130 MM HG: ICD-10-PCS | Mod: HCNC,CPTII,S$GLB, | Performed by: INTERNAL MEDICINE

## 2022-12-20 PROCEDURE — 3078F PR MOST RECENT DIASTOLIC BLOOD PRESSURE < 80 MM HG: ICD-10-PCS | Mod: HCNC,CPTII,S$GLB, | Performed by: INTERNAL MEDICINE

## 2022-12-20 PROCEDURE — 96523 IRRIG DRUG DELIVERY DEVICE: CPT | Mod: HCNC

## 2022-12-20 PROCEDURE — 1157F PR ADVANCE CARE PLAN OR EQUIV PRESENT IN MEDICAL RECORD: ICD-10-PCS | Mod: HCNC,CPTII,S$GLB, | Performed by: INTERNAL MEDICINE

## 2022-12-20 PROCEDURE — 99214 OFFICE O/P EST MOD 30 MIN: CPT | Mod: HCNC,S$GLB,, | Performed by: INTERNAL MEDICINE

## 2022-12-20 PROCEDURE — 3288F FALL RISK ASSESSMENT DOCD: CPT | Mod: HCNC,CPTII,S$GLB, | Performed by: INTERNAL MEDICINE

## 2022-12-20 PROCEDURE — 1101F PT FALLS ASSESS-DOCD LE1/YR: CPT | Mod: HCNC,CPTII,S$GLB, | Performed by: INTERNAL MEDICINE

## 2022-12-20 PROCEDURE — 99999 PR PBB SHADOW E&M-EST. PATIENT-LVL IV: CPT | Mod: PBBFAC,HCNC,, | Performed by: INTERNAL MEDICINE

## 2022-12-20 PROCEDURE — 25000003 PHARM REV CODE 250: Mod: HCNC | Performed by: INTERNAL MEDICINE

## 2022-12-20 PROCEDURE — A4216 STERILE WATER/SALINE, 10 ML: HCPCS | Mod: HCNC | Performed by: INTERNAL MEDICINE

## 2022-12-20 PROCEDURE — 85025 COMPLETE CBC W/AUTO DIFF WBC: CPT | Mod: HCNC | Performed by: INTERNAL MEDICINE

## 2022-12-20 PROCEDURE — 99214 PR OFFICE/OUTPT VISIT, EST, LEVL IV, 30-39 MIN: ICD-10-PCS | Mod: HCNC,S$GLB,, | Performed by: INTERNAL MEDICINE

## 2022-12-20 PROCEDURE — 36415 COLL VENOUS BLD VENIPUNCTURE: CPT | Mod: HCNC | Performed by: INTERNAL MEDICINE

## 2022-12-20 PROCEDURE — 80053 COMPREHEN METABOLIC PANEL: CPT | Mod: HCNC | Performed by: INTERNAL MEDICINE

## 2022-12-20 PROCEDURE — 63600175 PHARM REV CODE 636 W HCPCS: Mod: HCNC | Performed by: INTERNAL MEDICINE

## 2022-12-20 PROCEDURE — 1126F AMNT PAIN NOTED NONE PRSNT: CPT | Mod: HCNC,CPTII,S$GLB, | Performed by: INTERNAL MEDICINE

## 2022-12-20 PROCEDURE — 1101F PR PT FALLS ASSESS DOC 0-1 FALLS W/OUT INJ PAST YR: ICD-10-PCS | Mod: HCNC,CPTII,S$GLB, | Performed by: INTERNAL MEDICINE

## 2022-12-20 PROCEDURE — 1126F PR PAIN SEVERITY QUANTIFIED, NO PAIN PRESENT: ICD-10-PCS | Mod: HCNC,CPTII,S$GLB, | Performed by: INTERNAL MEDICINE

## 2022-12-20 PROCEDURE — 3288F PR FALLS RISK ASSESSMENT DOCUMENTED: ICD-10-PCS | Mod: HCNC,CPTII,S$GLB, | Performed by: INTERNAL MEDICINE

## 2022-12-20 PROCEDURE — 3074F SYST BP LT 130 MM HG: CPT | Mod: HCNC,CPTII,S$GLB, | Performed by: INTERNAL MEDICINE

## 2022-12-20 PROCEDURE — 1157F ADVNC CARE PLAN IN RCRD: CPT | Mod: HCNC,CPTII,S$GLB, | Performed by: INTERNAL MEDICINE

## 2022-12-20 PROCEDURE — 3078F DIAST BP <80 MM HG: CPT | Mod: HCNC,CPTII,S$GLB, | Performed by: INTERNAL MEDICINE

## 2022-12-20 PROCEDURE — 99999 PR PBB SHADOW E&M-EST. PATIENT-LVL IV: ICD-10-PCS | Mod: PBBFAC,HCNC,, | Performed by: INTERNAL MEDICINE

## 2022-12-20 RX ORDER — SODIUM CHLORIDE 0.9 % (FLUSH) 0.9 %
10 SYRINGE (ML) INJECTION
Status: CANCELLED | OUTPATIENT
Start: 2022-12-20

## 2022-12-20 RX ORDER — SODIUM CHLORIDE 0.9 % (FLUSH) 0.9 %
10 SYRINGE (ML) INJECTION
Status: DISCONTINUED | OUTPATIENT
Start: 2022-12-20 | End: 2022-12-20 | Stop reason: HOSPADM

## 2022-12-20 RX ORDER — HEPARIN 100 UNIT/ML
500 SYRINGE INTRAVENOUS
Status: CANCELLED | OUTPATIENT
Start: 2022-12-20

## 2022-12-20 RX ORDER — HEPARIN 100 UNIT/ML
500 SYRINGE INTRAVENOUS
Status: DISCONTINUED | OUTPATIENT
Start: 2022-12-20 | End: 2022-12-20 | Stop reason: HOSPADM

## 2022-12-20 RX ADMIN — Medication 10 ML: at 09:12

## 2022-12-20 RX ADMIN — HEPARIN 500 UNITS: 100 SYRINGE at 09:12

## 2022-12-20 NOTE — Clinical Note
Cbc,cmp, Mag, TSH, Free T4  prior to follow-up 6 weeks PAC FLUSH today and  on same day as follow-up  Dr. Pisano f/u   GIVE HAT and gift car

## 2022-12-20 NOTE — PLAN OF CARE
Patient arrived to unit for maintenance port flush. Patient was accessed, flushed with saline, blood return was noted, heparin locked and de-accessed.Needle intact. Tolerated well. Discharged off unit ambulating independently in no signs of acute distress.

## 2022-12-20 NOTE — PROGRESS NOTES
Subjective:       Patient ID: Ade Castellano is a 77 y.o. female.    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 The PET(+) disease in the right axilla and right supraclavicular fossa will be boosted at 200 cGy per fraction to 6000 cGy total dose.  S/p LYMPH NODE, RIGHT AXILLA, BIOPSY: 6/16/21 . Metastatic poorly-differentiated carcinoma, c/w breast primary ERnegPRneg Her2 neg  PD-L1 (22C3) IHC CPS> is greater than or equal to 10  Pembrolizumab 7/22/21 -present        Prior Hx:  76 y/o female with history of  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  And Rt  breast Haja/p  cycle 6 of carboplatin/Abraxane 7/2/2018   She has  History of Stage 1A  Rt breast cancer Grade 3, ER/MN neg , Her 2 jose maria neg s/p lumpectomy 7/11/2014 and s/p adjuvant RT 9/2014 Pt declined Adjuvant chemo.Pathology showed a 1.15 cm, grade 3 infiltrating ductal carcinoma.  One sentinel lymph node was negative for malignancy.  Margins were negative and the closest margin was 1 cm.  She was staged  pT1c  pN0 cM0 stage IA.  She declined adjuvant chemo therapy.  She completed post operative radiation therapy at 400 cGy per fraction to 4000 cGy 9/2014  Pt hospitalized 11/2017 for  acute on chronic respiratory failure requiring intubation and ventilation. Has large lung mass in right lung with probable post obstructive pneumonia resulting in COPD exacerbation.CTA  chest 11/29/2017 revealed   Large right hilar mass with involvement of adjacent segmental pulmonary arterial branches and bronchi with associated postobstructive atelectasis and volume loss in the RML  with adjacent ground glass opacity concerning for underlying neoplastic process. Mediastinal and axillary adenopathy, with a right axillary lymph node measuring up to 2.0 cm in short axis diameter.She underwent rt axillary LN bx at outside facility- on 1/16/2018 at [facility]. Pathology revealed metastatic poorly differentiated carcinoma of unknown primary site. She next underwent lung bx at [facility] 1/31/2018  benign lung tissue. Repeat Right Lung Bx 2/14/2018 Pathology reveals Squamous cell carcinoma PD-L1 10% low expression EGFR NEG ALK NEG BRAF NEG. Outside slides axillary LN specimen were reviewed/comparison to lung bx findings . She completed    cycle 6 of carboplatin/Abraxane completed 7/2018 .PET/CT  4/19/2018 revealed there has been a excellent response to therapy.  At least 90% reduction in malignant activity.PET/CT 2/21/2019 increased size and irregularity of the right axillary lymph node with increased FDG avidityMAMMO/US rt breast 2/2019 revealed suspicious findings rt axilla LN.  Right axilla, mass, core biopsy: Positive for poorly differentiated carcinoma breast primary ER neg/GA neg Her2 neg.[facility] for outside reviewIt was determined  the lung tumor corresponds to a squamous cellcarcinoma and appears to be unrelated to the invasive ductal carcinoma of the breast. The axillary mass, in myopinion, corresponds to metastases of the breast primary. Although it is a poorly differentiated carcinoma, theimmunophenotype is most consistent with the one that the breast primary exhibited, and is inconsistent with metastatic squamous cell carcinoma. She was treated with  AC ( adriamycin 60m/m2/Cytoxan 600mg/m2)  q21d x 4 cycles completed 9/6/2019 . PET/CT 9/19/2019 shows disease response l decrease in  size and uptake of several right axillary lymph nodes and a supraclavicular lymph node.  Mild residual activity may indicate viable tumor.Pt followed by Rad/Onc. She was presented at [facility] tumor board and it was determined to proceed Radiation therapy only. No ALND due to concurrent lung and supraclavicular node. She  completed  RT 12/16/2019  To right axilla and right supraclavicular region PET/CT imaging  5/20/21 shows Continued increase in both size and hypermetabolic activity of right axillary level 1 lymph nodes a new, mildly hypermetabolic cutaneous thickening of the right breast. she underwent LYMPH NODE, RIGHT AXILLA, BIOPSY: 6/16/21 . Pathology showed Metastatic poorly-differentiated carcinoma, consistent with breast primary-ERneg/ PRneg  HER2  neg  Pt started on pembrolizumab 7/2021  Pt hospitalized 4/6/22 with hypokalemia and orthostatic hypotension        Interval Hx:S/p C16 pembrolizumab 6/15/22  ( 400mg q6wks)  PET /CT  11/21/22 shows New right lower lobe infiltrate worrisome for pneumonia or aspiration.Chronic infiltrate right middle lobe.   Resolution of the left lower lobe infiltrate  Pt recently treated by pulmonologist for pneumonia   Pt treated with course of abx last f/u   Cough improved  She continues with LOGAN-worse past couple of mos  Lost to f/u with Cardiology  She reports life related stressors  No fevers  No fatigue  Appetite improved  She has supp 02 at home     PET /CT  11/21/22 shows New right lower lobe infiltrate worrisome for pneumonia or aspiration.Chronic infiltrate right middle lobe.   Resolution of the left lower lobe infiltrate.         She is followed by Dr. Katz, pulmonology    She also has been followed by GI for dysphagia  In past  She has undergone dilation      Last Mammo 6/16/21  No mammographic evidence of malignancy.BI-RADS Category 1: Negative      PET/CT 1/26/22 No definite evidence of hypermetabolic tumor.  Interval resolution of hypermetabolic right  "axillary lymph nodes.  No new hypermetabolic findings.        PrevHx:She had an abnormal mammogram 6/4/2014 whichRevealed a round solid mass 6mm in rt breast.She then underwent U/S guided core bx of rt breast mass on 6/17/2014.Pathology revealed infiltrating ductal carcinoma Grade 3 with tumorPresent in thin-walled spaces suggestive of lymphatic spaces. HormoneReceptor status on tumor specimen revealed ER negative 0% ,ME negative 0% and Her 2 jose maria negative.She subsequently underwent rt segmental mastectomy and SLN bxOn 7/11/2014. Pathology of rt breast lumpectomy revealed invasiveDuctal carcinoma with micropapillary pattern ( Invasive micropapillaryCa) with max tumor dimension 11.5mm with suggestion of tumor in Thin walled spaces c/w lymphovascular involvement. SurgicalMargins free of tumor, grade 3, ER/ME neg , Her 2 jose maria neg With [place] Lymph node negative for Neoplasm. Pathologic staging [id number](i-)Her mother was diagnosed with breast cancer in her 50's/        Review of Systems   Constitutional: Positive for appetite change ( improved). No fatigue, activity change,  fever.   HENT: Negative for mouth sores, rhinorrhea and trouble swallowing.    Eyes: Negative for visual disturbance.   Respiratory: Negative for cough . Positive for shortness of breath (improved). Negative for wheezing.    Cardiovascular: Negative for chest pain.   Gastrointestinal: Negative for abdominal pain and diarrhea.   Genitourinary: Negative for frequency.   Musculoskeletal: Negative for back pain.   Skin: Negative for rash.   Neurological: Negative for dizziness and headaches.   Hematological: Negative for adenopathy.   Psychiatric/Behavioral: The patient is not nervous/anxious.        Objective:        Vitals:    12/20/22 0807   Weight: 72.3 kg (159 lb 6.3 oz)   Height: 5' 3" (1.6 m)       Vitals:    12/20/22 0807   BP: (!) 123/58   BP Location: Left arm   Patient Position: Sitting   BP Method: Large (Automatic)   Pulse: (!) 53   Weight: " "72.3 kg (159 lb 6.3 oz)   Height: 5' 3" (1.6 m)           Physical Exam   Constitutional: She is oriented to person, place, and time. She appears ill, coughing episodes  HENT:   Head: Normocephalic.   Eyes: Conjunctivae and lids are normal.No scleral icterus.   Neck: Normal range of motion. Neck supple. No thyromegaly present.   Cardiovascular: Normal rate, regular rhythm and normal heart sounds.    No murmur heard.  Pulmonary/Chest: Breath sounds normal. She has no wheezes. She has no rales.   Abdominal: Soft. Bowel sounds are normal.  There is no tenderness. There is no rebound and no guarding.   Left breast- no masses or axillary LAD noted  Right breast- breast thickening inner quad    She has no cervical adenopathy.     She has rt axillary adenopathy.        Right: No supraclavicular adenopathy present.        Left: No supraclavicular adenopathy present.   Neurological: She is alert and oriented to person, place, and time. No cranial nerve deficit. Coordination normal.   Skin: Skin is warm and dry. No ecchymosis, no petechiae and no rash noted. No erythema.   Psychiatric: She has a normal mood and affect.             CT a/p w/contrast 11/29/2018   1. No acute abnormality identified within the abdomen and pelvis.    2.  There are a few nonspecific prominent lymph nodes in the upper abdomen including a periesophageal lymph node which measures 1.0 cm in short axis diameter.    3.  Significant abdominal aortic atherosclerosis and abdominal aorta ectasia.    4.  Additional findings as above.    PET/CT 1/26/2018   1.  Intense FDG uptake within the known right infrahilar lung mass, compatible with malignancy.  It is unclear if this represents primary lung malignancy or metastatic breast cancer.    2.  Intensely hypermetabolic right axillary, retropectoral, and lower right cervical lymph nodes, compatible with metastases.    3.  Abnormal FDG uptake along right breast skin thickening, new from prior chest CTA and " mammogram.  This may relate to localized edema/inflammation, though correlation with physical exam and mammography are recommended to exclude underlying inflammatory carcinoma.      MRI Brain w w/out contrast 2/9/2018  1.  No evidence of intracranial metastases.  2.  Sinus disease       Rt axillary LN bx at outside facility- on 1/16/2018 at [facility]  Pathology revealed metastatic poorly differentiated carcinoma of unknown primary  site 1/16/2018 at [facility]  TTF-1 negative, napsin negative  , cytokeratin 7 positive, cytokeratin 20 negative, p63 negative, cytokeratin 5/6 focal dim positivity    LUNG BIOPSY 1/31/2018  Pathology revealed benign lung tissue         SPECIMEN  1) Right Lung Bx 2/14/2018  Supplemental Diagnosis  Immunohistochemical stains show strong nuclear staining for p63 in essentially all tumor cells and very strong  cytoplasmic and membrane staining for CK5/6, also in essentially all tumor cells. TTF-1 and CK7 are negative  within tumor cells but do stain native pulmonary elements present within the biopsy. A stain for mucicarmine is  negative. Positive and negative controls function appropriately.  Final diagnosis: Specimen submitted as right lung biopsy  -Squamous cell carcinoma  (Electronically Signed: 2018-02-20 09:12:07 )  Diagnosed by: Phong Thompson  FINAL PATHOLOGIC DIAGNOSIS  Fragments of pulmonary parenchyma (submitted as right lung biopsy):    FINAL PATHOLOGIC DIAGNOSIS 1. Lymph node, right axilla, biopsy, review of 10 outside slides [facility], JS 18 1 105,  collected January 11, 2018: Metastatic poorly differentiated carcinoma (see comment).  The histologic section shows fibrous stroma infiltrated by nest of poorly differentiated malignant cells including  occasional dyskeratotic cells morphologically suggestive of metastatic squamous cell carcinoma.  Immunohistochemical stains are nonspecific; the cells are positive for cytokeratin 7 and  cytokeratin 5/6 (focal) and  negative for cytokeratin 20, TTF-1, p63, GCDFP, mammoglobin, and CEA        PET/CT 2/21/2019  Increased size and irregularity of the right axillary lymph node with increased FDG avidity.  Although this would be atypical in location for lung carcinoma, the increased size and avidity must be concerning for metastatic disease in this patient with history of squamous cell lung cancer.     US Rt breast and mammo 2/26/2019  Impression:  Right  Lymph Node: Right axilla lymph node. Assessment: 4 - Suspicious finding. Biopsy is recommended.      BI-RADS Category:   Right: 4 - Suspicious  Overall: 4 - Suspicious     Recommendation:  Biopsy is recommended. Biopsy of the lymph node measuring 1.4 x 1.1 x 1.3 recommended , the one with the round shape configuration, corresponding to the most recent PET CT finding.         Pathology 3/11/2019   FINAL PATHOLOGIC DIAGNOSIS  Right axilla, mass, core biopsy:  Positive for poorly differentiated carcinoma.  See comment.  Comment:  The biopsy from the right axilla mass shows a poorly differentiated carcinoma in background lymphoid tissue  with enlarged cells showing increased nuclear size, prominent nucleoli, moderate amounts of cytoplasm and mitotic  figures. Immunostains are completed and reveal the tumor cells to stain positively with cytokeratin AE 1/AE3, CK  5/6 and CK 7. The tumor cells are negative for CK 20, Estrogen receptor, p63 and TTF-1. All stains have  satisfactory positive and negative controls. The patient's prior cases will be reviewed and an additional stain for  JUAREZ-3 is pending in an attempt to pinpoint the primary site of this malignancy and results will follow in a  supplemental report.      Supplemental Diagnosis  3/11/2019  The current axillary mass is compared to the patient's prior right lung biopsy (case number [id number]) and the  current axillary mass is morphologically similar to the lung malignancy. Also, the current axillary  mass is compared  to the patient's prior breast resection (Case number [id number]) and appears similar as well. P63 is negative in the  [id number] tumor and CK 5/6 shows scattered positive staining in the [id number] tumor.  Immunostain for JUAREZ-3 is completed and shows only rare weak to moderate staining within the tumor cell nuclei  with satisfactory positive and negative controls. This stain is positive in the surrounding lymphocytes. This staining  pattern is non-specific and does not definitively differentiate between a lung and breast primary malignancy in this  right axilla mass biopsy. The staining profile of the current axillary mass match more closely with the previous  breast carcinoma (due to the p63 negativity in both the 2014 breast cancer and the current axillary mass lesion but  p63 positivity in the lung mass from 2018).  This staining profile, together with the comparison with the patient's prior breast tumor and prior lung tumor supports  a diagnosis of poorly differentiated carcinoma and the malignancy appears morphologically similar to the prior  malignancies from both sites, but the staining profile in this small sample from the axillary mass more closely  correlates with the prior breast malignancy. Pathologic subclassification of this malignancy's primary location is not  definitive and clinical correlation is recommended definitively decide on possible primary location in this patient's  axillary mass sample.  Supplemental (2):  Additional immunohistochemical staining for progesterone receptor and HER2 are completed per clinician request  with following results:  Progesterone receptor: Negative; 0% nuclear tumor cell staining.  HER2: Negative; stain score = 0.  Supplemental (3):  [place] DIAGNOSIS:  FINAL DIAGNOSIS  Breast, right, needle core biopsy ([id number]; 06/17/2014): Invasive ductal carcinoma, Jaiden grade III (of  III), with focal micropapillary features.  Immunohistochemical stains  performed at the referring institution show that the tumor cells have the following  phenotype: - Estrogen receptor: Negative (0% tumor cells staining). - Progesterone receptor: Negative (0% tumor  cells staining). - HER2: Negative (score 0).  Lymph nodes, right, sentinel biopsy and lumpectomy ([id number]; 07/11/2014):  1. Right sentinel lymph node: A single (1) lymph node is negative for metastatic carcinoma.  Immunohistochemical stains performed by the referring institution and reviewed at [facility] for cytokeratin are  negative, confirming the diagnosis.  2. Right breast: Invasive ductal carcinoma with micropapillary features, per report 11.5 mm in greatest dimension.  Foci suspicious for lymphovascular invasion identified. Margins of resection are free of tumor.  Immunohistochemical stains performed by the referring institution and reviewed at [facility] show that the tumor  cells are negative for p63 and show patchy positivity for CK 5/6.  Lung, right, needle core biopsy ([id number]; 02/14/2018): Squamous cell carcinoma.    Immunohistochemical stains performed by the referring institution show the tumor cells are strongly positive for p63  and CK 5/6, while negative for TTF-1 and CK7. These result support the diagnosis. Axilla, right, needle core biopsy  ([id number]; 03/11/2019): Lymph node positive for metastatic carcinoma, most consistent with breast primary  (see comment).  Immunohistochemical stains performed by the referring institution and reviewed at [facility] show that the tumor  cells are negative for p63, focally positive for CK 5/6, focally and weakly positive for JUAREZ-3, and strongly positive  for CK7. They are also negative for estrogen receptor, progesterone receptor, and HER2 JAM (score 0).  COMMENT:  Thank you for allowing me to review the case of this 73-year-old lady who recently underwent needle core biopsies  of a right axillary mass and who has a previous diagnosis of an invasive  ductal carcinoma of the right breast, as well  as a squamous cell carcinoma of the lung. I am reviewing this case because of my special interest in breast  pathology.  I agree with your interpretation of this case. In my opinion, the lung tumor corresponds to a squamous cell  carcinoma and appears to be unrelated to the invasive ductal carcinoma of the breast. The axillary mass, in my  opinion, corresponds to metastases of the breast primary. Although it is a poorly differentiated carcinoma, the  immunophenotype is most consistent with the one that the breast primary exhibited, and is inconsistent with a  metastatic squamous cell carcinoma. I did share the case with Dr. Anabel Stone, one of our pulmonary pathologists,  and she concurs with this interpretation.  Note: Report attached.  Performing location:  [address] [address]      LYMPH NODE, RIGHT AXILLA, BIOPSY: 6/16/21   - Metastatic poorly-differentiated carcinoma, consistent with breast primary  -ER, NM, and HER2 immunohistochemical hormonal markers are also negative  PD-L1 (22C3) SemiQuant IHC, Manual  Interpretation  Right axillary lymph node , specimen for PD-L1 immunohistochemistry studies (clone 22C3, [employer], [place], [place]; using a proprietary detection system) ([id number]):  Reported carcinoma site of origin: Breast  Result: The percent of PD-L1 positive cells based upon the total number of tumor cells (combined positive  score, CPS) is greater than or equal to 10.  Interpretation: Studies suggest that positive PD-L1 immunohistochemistry in tumor cells and/or tumorassociated  immune cells may predict tumor response to therapy with immune checkpoint inhibitors. This  result should not be used as the sole factor in determining treatment, as other factors (for example, tumor  mutation burden, and microsatellite instability) have been also studied as predictive  markers.          CT neck/chest/abd/pelvis w/contrast 4/26/2019   The previously described right hilar mass is no longer visible.  There is persistent volume loss in the right middle lobe this appears similar to the prior PET-CT February 21, 2019.    Persistent abnormal right axillary node today measuring about 15 mm compared 18 mm prior.  The positioning of the adjacent nodes is slightly different likely accounting for the slight difference in measurement.    Cluster of tree-in-bud nodular densities left lower lobe series 2, image 93.  This may be related to small airways disease though serial follow-up suggested.      PET/CT 6/20/2019   New and worsening hypermetabolic lymph nodes concerning for metastatic breast cancer as described above.  Tissue sampling would be required for definitive diagnosis.      2-D echo 6/27/2019   Normal left ventricular systolic function. The estimated ejection fraction is 55%  Concentric left ventricular remodeling.  Normal LV diastolic function.  Normal right ventricular systolic function.  Moderate left atrial enlargement.  Mild right atrial enlargement.  Mild aortic regurgitation.  Mild mitral regurgitation.  Mild tricuspid regurgitation.  Normal central venous pressure (3 mm Hg).  The estimated PA systolic pressure is 25 mm Hg      PET/CT 9/19/2019   Favorable response to therapy with interval decrease in size and uptake of several right axillary lymph nodes and a supraclavicular lymph node.  Mild residual activity may indicate viable tumor.    No new hypermetabolic findings worrisome for malignancy.    PET/CT 2/28/2020  1.  No evidence of hypermetabolic tumor.  Continued improvement of the right axilla status post adjuvant radiation.    2.  No new hypermetabolic lesions      CTA 6/17/2020  1. No evidence of pulmonary embolus. No aortic dissection.   2. There is no evidence for new or progressive disease in the chest as compared to PET/CT 6/20/2019 in this patient with history of  right breast cancer and right lung neoplasm. The patient does have a more recent PET/CT 2/28/2020 from an outside facility per the electronic medical record although images are not available for direct comparison. Correlation with these images may be beneficial. Continued long-term surveillance recommended.  3. Stable appearance of the treated tumor in the right hilum/middle lobe.  4. Stable treated right breast cancer and axillary adenopathy without evidence for developing breast mass or new right axillary adenopathy.  5. Stable tiny nodularity/tree-in-bud densities in the left lung, probably postinfectious or inflammatory.  6. Wall thickening of the esophagus may be correlated for esophagitis. Possible tiny hiatus hernia.  7. Slight bronchial wall thickening may be correlated for bronchitis.  8. Mild to moderate emphysema as before.  9. Reflux of contrast into the IVC and hepatic veins is nonspecific but may be correlated for elevated right heart pressures.  10. Coronary calcifications and/or stents similar to prior.    Echo 5/21/2020  Technically difficult study.   Normal left ventricular systolic function.   Left ventricular ejection fraction is estimated at 55%.   Severe mitral annular calcification.   Mild mitral valve regurgitation.   Aortic valve sclerosis without stenosis or regurgitation.     PFTs 6/17/2020  Spirometry reveals a very severe obstructive lung defect, which does not significantly improve post bronchodilators. Lung volumes revealed air trapping. Diffusing capacity was moderately impaired.        Del 6/26/2020  BI-RADS Category:   Overall: 2 - Benign      PET/CT 10/23/2020   Impression:     Stable mildly hypermetabolic right axillary lymph node/nodular density contralateral to the site of injection.  Differential considerations include metastatic lymph node, reactive lymph node from benign right upper extremity process, and fat necrosis.  Recommend physical examination of the upper extremity  and consideration of ultrasound for further evaluation of the node/nodule and possible image guided biopsy.  Otherwise, attention on follow-up.     Otherwise, no other hypermetabolic disease identified      Mammo diagnostic right /US 11/4/2020   Impression:   1. No suspicious finding either on mammogram or ultrasound at the site of the palpable lump in the right breast at 10:00 o'clock. A bilateral mammogram is recommended in June 2020 to return to the patient to annual screening.   2. Redemonstration of the morphologically abnormal lymph node in the right axilla that contains a biopsy clip, compatible with the known recurrence metastatic breast cancer that has been treated with chemotherapy. The appearance of this lymph node is unchanged from 06/26/2020 and overall appears smaller when compared to 03/11/2019.     Abd US 12/11/2020   Impression:     1. Echogenic liver likely representing hepatic steatosis.  2. Right upper quadrant ultrasound otherwise is unremarkable.     PET/CT 10/14/21     Impression:     1.  No definite evidence of hypermetabolic tumor.  Interval resolution of hypermetabolic right axillary lymph nodes.  No new hypermetabolic findings.     2.  Persistent low-grade diffuse cutaneous uptake which is nonspecific.    MRI shoulder w w/out contrast 1/26/22   Impression:     Mild edema and postcontrast enhancement at the myotendinous junction of the supraspinatus and infraspinatus, it is nonspecific and could be due to degenerative change or tendinosis.     Small area of interstitial tear at the footprint insertion of the infraspinatus tendon.     No large rotator cuff tear.     No advanced degenerative change.     No osseous lesions.     PET/CT 1/26/22  Impression:In this patient with breast cancer, there is no evidence of hypermetabolic tumor to suggest recurrent or metastatic disease.   Less extensive but, nonspecific, low-grade diffuse cutaneous uptake of the right breast.       PET/CT  4/27/22  Impression:     1.  No FDG avid disease to suggest recurrence or metastatic disease.     Unchanged right breast skin thickening with mild FDG uptake.       PET/CT 7/13/22   Impression:     In this patient with lung and breast cancer, there is new left lower lobe opacification with mild radiotracer uptake which may represent aspiration, pneumonia, or malignancy.  Tissue sampling would be required for definitive diagnosis.     Unchanged right breast skin thickening with mild radiotracer uptake.    CT chest w/contrast 8/25/22    Decreasing patchy pulmonary consolidation within the left lower lobe when compared to prior PET-CT scan dated 07/13/2022.  The overall appearance of the patchy consolidation as well as the moderate improvement when compared to the prior study suggest a benign etiology such as left lower lobe pneumonia.     Persistent band of atelectasis/scarring within the right middle lobe, stable dating back to 04/26/2019.     Coronary artery atherosclerosis in a multi vessel distribution.     There are no measurable lesions per RECIST criteria.    PET /CT  11/21/22 shows New right lower lobe infiltrate worrisome for pneumonia or aspiration.Chronic infiltrate right middle lobe.   Resolution of the left lower lobe infiltrate.    Mammo 7/26/22  BI-RADS Category:   Overall: 2 - Benign        CT chest with contrast 11/21/22    Impression:     New right lower lobe infiltrate worrisome for pneumonia or aspiration.     Chronic infiltrate right middle lobe.     Resolution of the left lower lobe infiltrate.     Coronary artery calcifications.  Assessment:       1. Recurrent breast cancer, unspecified laterality    2. History of lung cancer    3. Chronic obstructive pulmonary disease, unspecified COPD type            Plan:     1.,2.   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  S/p adjuvant RT completed 9/2014  Pt declined adjuvant chemo  Pt  hospitalized 11/2017 with acute-on-chronic  resp failure  Abnormal CT imaging revealing Large right hilar mass with involvement of  adjacent segmental pulmonary arterial branches and bronchi with associated postobstructive atelectasis  and involvement of mediastinal and axillary adenopathy   S/p rt axillary LN bx ( outside facility) - metastatic poorly differentiated carcinoma of unknown primary  site  status post  lung biopsy 1/31/2017 -benign lung tissue  PET/CT 1/26/2018   Intense FDG uptake within the known right infrahilar lung mass, compatible with malignancy.   Intensely hypermetabolic right axillary, retropectoral, and lower right cervical lymph nodes, compatible with metastases.  Abnormal FDG uptake along right breast skin thickening, new from prior chest CTA and mammogram.    Repeat lung bx 2/14/2018 revealed Squamous Cell CA lung PD-L1 10% EGFR NEGALK NEG  Pt treated for advanced squamous cell CA of lung   She s/p  cycle 6 of carboplatin/Abraxane Day1 completed 7/2/2018 ( day 15 held due to prolonged cytopenias)  She completed therapy with near CR     PET/CT 2/21/2019 showed increased size and irregularity of the right axillary lymph node with increased FDG avidity     MAMMO/US rt breast 3/2019  reveals suspicious findings rt axilla LN.  Biopsy of the lymph node measuring 1.4 x 1.1 x 1.3     Pt s/p  rt axillary LN bxPositive for poorly differentiated carcinoma.- likely breast primary ERneg PRneg Her 2 neg    Outside review of specimen(s) revealed  lung tumor corresponds to a squamous cell carcinoma and appears to be unrelated to the invasive ductal carcinoma of the breast. It was determined The axillary mass, in my opinion, corresponded  to metastases of the breast primary. Although it is a poorly differentiated carcinoma, theimmunophenotype is most consistent with the one that the breast primary exhibited, and is inconsistent with a metastatic squamous cell carcinoma.      CT imaging 4/26/2019 persistent rt axillary LAD, no other sites of disease     Lymph node biopsy 3/11/2019 Right axilla, mass, core biopsy:Positive for poorly differentiated carcinoma.favor breast primary     PET/CT 6/20/2019  New and worsening hypermetabolic lymph nodes concerning for metastatic breast cancer     Previously Discussed  imaging findings in detail with patient which reveals new and worsening hypermetabolic melena metabolic lymph nodes including hypermetabolic supraclavicular node.  Therefore, at treatment option will be systemic chemotherapy in light of the recent imaging findings.  She will be followed by her surgical oncologist Dr. Christopher      IT was determined to proceed with systemic chemotherapy   S/p  AC [id number] x 3 wks x 4 wks completed 9/6/2019   ( pt has not received in past)      PET/CT 9/19/2019 shows disease response with interval decrease in size and uptake of several right axillary lymph nodes and a supraclavicular lymph node.  Mild residual activity may indicate viable tumor.No new hypermetabolic findings worrisome for malignancy.    Pt followed by  Breast Surgery and plan was  for possible   ALND. Pt with Recurrent cancer in her right axilla and right supraclavicular fossa.   She completed chemotherapy 9/6/2019 with near CR  It was determined  Radiation will be given to the original areas of hypermetabolic activity in the right axilla and right supraclavicular region    Pt completed  RT 12/16/2019     PET/CT 1/14/21 shows   New right axillary hypermetabolic lymph nodes with preserved normal architecture suggestive of reactive nodes.  Stable appearing previously identified hypermetabolic lymph node with mild increase in surrounding fat stranding.        Findings previously d/w with treating breast surgeon and it was determined to cont to monitor and close f/u as pt is heavily pre treated, has received RT to site   Pt acknowledged understanding and agreeable with plan     PET/CT  imaging  5/20/21 shows Continued increase in both size and hypermetabolic activity of right axillary level 1 lymph nodes a new, mildly hypermetabolic cutaneous thickening of the right breast.     Right breast, skin, punch biopsy:Negative for carcinoma    LYMPH NODE, RIGHT AXILLA, BIOPSY: 6/16/21   - Metastatic poorly-differentiated carcinoma, consistent with breast primary triple neg  PD-L1 immunohistochemistry studies(combined positive score, CPS) is greater than or equal to 10   PET/CT showed  No definite evidence of hypermetabolic tumor.  Interval resolution of hypermetabolic right axillary lymph nodes.  No new hypermetabolic findings.      S/p C16 pembrolizumab 6/15/22   Last  PET/CT imaging shows  new left lower lobe opacification with mild radiotracer uptake which may represent aspiration, pneumonia, or malignancy.  Recent follow-up imaging studies decreasing patchy pulmonary consolidation within the left lower lobe when compared to prior PET-CT scan dated 07/13/2022.  Plan to remain off of therapy   Follow-up PET/CT imaging planned in near future    3. ,4.F/b Pulmonology  Recently treated for pneumonia with improvement in sx's  Pt on supp 02 at night   Cont MDI and O2 as prescribed     5. Likely multi factorial  Lost to f/u with Card  Referral to Cardiology    Cbc,cmp, Mag, TSH, Free T4  prior to follow-up 6 weeks  PAC FLUSH on same day as follow-up    Advance Care Planning     Power of   I previously  initiated the process of advance care planning today and explained the importance of this process to the patient.  I introduced the concept of advance directives to the patient, as well. Then the patient received detailed information about the importance of designating a Health Care Power of  (HCPOA). She was also instructed to communicate with this person about their wishes for future healthcare, should she become sick and lose decision-making capacity. The patient has previously appointed a  HCPOA. After our discussion, the patient has decided to complete a HCPOA and has appointed her son and niece and  I spent a total time of 16 minutes discussing this issue with the patient.         Cc: Swetha Katz M.D.         MD Ranjan Roberson MD

## 2022-12-22 ENCOUNTER — OFFICE VISIT (OUTPATIENT)
Dept: CARDIOLOGY | Facility: CLINIC | Age: 77
End: 2022-12-22
Payer: MEDICARE

## 2022-12-22 VITALS
RESPIRATION RATE: 18 BRPM | HEART RATE: 55 BPM | OXYGEN SATURATION: 96 % | SYSTOLIC BLOOD PRESSURE: 148 MMHG | DIASTOLIC BLOOD PRESSURE: 72 MMHG | BODY MASS INDEX: 28.25 KG/M2 | WEIGHT: 159.5 LBS

## 2022-12-22 DIAGNOSIS — I25.118 CORONARY ARTERY DISEASE OF NATIVE ARTERY OF NATIVE HEART WITH STABLE ANGINA PECTORIS: ICD-10-CM

## 2022-12-22 DIAGNOSIS — E78.5 HYPERLIPIDEMIA LDL GOAL <70: Chronic | ICD-10-CM

## 2022-12-22 DIAGNOSIS — I70.0 ATHEROSCLEROSIS OF AORTA: Chronic | ICD-10-CM

## 2022-12-22 DIAGNOSIS — R06.09 DOE (DYSPNEA ON EXERTION): ICD-10-CM

## 2022-12-22 DIAGNOSIS — J44.9 CHRONIC OBSTRUCTIVE PULMONARY DISEASE, UNSPECIFIED COPD TYPE: Chronic | ICD-10-CM

## 2022-12-22 DIAGNOSIS — I25.2 OLD MYOCARDIAL INFARCTION: Primary | ICD-10-CM

## 2022-12-22 DIAGNOSIS — J44.9 STAGE 3 SEVERE COPD BY GOLD CLASSIFICATION: ICD-10-CM

## 2022-12-22 PROCEDURE — 1126F AMNT PAIN NOTED NONE PRSNT: CPT | Mod: HCNC,CPTII,S$GLB, | Performed by: INTERNAL MEDICINE

## 2022-12-22 PROCEDURE — 99214 OFFICE O/P EST MOD 30 MIN: CPT | Mod: HCNC,S$GLB,, | Performed by: INTERNAL MEDICINE

## 2022-12-22 PROCEDURE — 1101F PR PT FALLS ASSESS DOC 0-1 FALLS W/OUT INJ PAST YR: ICD-10-PCS | Mod: HCNC,CPTII,S$GLB, | Performed by: INTERNAL MEDICINE

## 2022-12-22 PROCEDURE — 1160F RVW MEDS BY RX/DR IN RCRD: CPT | Mod: HCNC,CPTII,S$GLB, | Performed by: INTERNAL MEDICINE

## 2022-12-22 PROCEDURE — 1126F PR PAIN SEVERITY QUANTIFIED, NO PAIN PRESENT: ICD-10-PCS | Mod: HCNC,CPTII,S$GLB, | Performed by: INTERNAL MEDICINE

## 2022-12-22 PROCEDURE — 1159F MED LIST DOCD IN RCRD: CPT | Mod: HCNC,CPTII,S$GLB, | Performed by: INTERNAL MEDICINE

## 2022-12-22 PROCEDURE — 1157F PR ADVANCE CARE PLAN OR EQUIV PRESENT IN MEDICAL RECORD: ICD-10-PCS | Mod: HCNC,CPTII,S$GLB, | Performed by: INTERNAL MEDICINE

## 2022-12-22 PROCEDURE — 1159F PR MEDICATION LIST DOCUMENTED IN MEDICAL RECORD: ICD-10-PCS | Mod: HCNC,CPTII,S$GLB, | Performed by: INTERNAL MEDICINE

## 2022-12-22 PROCEDURE — 1101F PT FALLS ASSESS-DOCD LE1/YR: CPT | Mod: HCNC,CPTII,S$GLB, | Performed by: INTERNAL MEDICINE

## 2022-12-22 PROCEDURE — 93000 ELECTROCARDIOGRAM COMPLETE: CPT | Mod: HCNC,S$GLB,, | Performed by: INTERNAL MEDICINE

## 2022-12-22 PROCEDURE — 99999 PR PBB SHADOW E&M-EST. PATIENT-LVL IV: ICD-10-PCS | Mod: PBBFAC,HCNC,, | Performed by: INTERNAL MEDICINE

## 2022-12-22 PROCEDURE — 1157F ADVNC CARE PLAN IN RCRD: CPT | Mod: HCNC,CPTII,S$GLB, | Performed by: INTERNAL MEDICINE

## 2022-12-22 PROCEDURE — 93000 EKG 12-LEAD: ICD-10-PCS | Mod: HCNC,S$GLB,, | Performed by: INTERNAL MEDICINE

## 2022-12-22 PROCEDURE — 3077F PR MOST RECENT SYSTOLIC BLOOD PRESSURE >= 140 MM HG: ICD-10-PCS | Mod: HCNC,CPTII,S$GLB, | Performed by: INTERNAL MEDICINE

## 2022-12-22 PROCEDURE — 99999 PR PBB SHADOW E&M-EST. PATIENT-LVL IV: CPT | Mod: PBBFAC,HCNC,, | Performed by: INTERNAL MEDICINE

## 2022-12-22 PROCEDURE — 3077F SYST BP >= 140 MM HG: CPT | Mod: HCNC,CPTII,S$GLB, | Performed by: INTERNAL MEDICINE

## 2022-12-22 PROCEDURE — 3288F FALL RISK ASSESSMENT DOCD: CPT | Mod: HCNC,CPTII,S$GLB, | Performed by: INTERNAL MEDICINE

## 2022-12-22 PROCEDURE — 1160F PR REVIEW ALL MEDS BY PRESCRIBER/CLIN PHARMACIST DOCUMENTED: ICD-10-PCS | Mod: HCNC,CPTII,S$GLB, | Performed by: INTERNAL MEDICINE

## 2022-12-22 PROCEDURE — 3078F DIAST BP <80 MM HG: CPT | Mod: HCNC,CPTII,S$GLB, | Performed by: INTERNAL MEDICINE

## 2022-12-22 PROCEDURE — 3288F PR FALLS RISK ASSESSMENT DOCUMENTED: ICD-10-PCS | Mod: HCNC,CPTII,S$GLB, | Performed by: INTERNAL MEDICINE

## 2022-12-22 PROCEDURE — 3078F PR MOST RECENT DIASTOLIC BLOOD PRESSURE < 80 MM HG: ICD-10-PCS | Mod: HCNC,CPTII,S$GLB, | Performed by: INTERNAL MEDICINE

## 2022-12-22 PROCEDURE — 99214 PR OFFICE/OUTPT VISIT, EST, LEVL IV, 30-39 MIN: ICD-10-PCS | Mod: HCNC,S$GLB,, | Performed by: INTERNAL MEDICINE

## 2022-12-22 RX ORDER — ROSUVASTATIN CALCIUM 10 MG/1
10 TABLET, COATED ORAL DAILY
Qty: 30 TABLET | Refills: 11 | Status: SHIPPED | OUTPATIENT
Start: 2022-12-22 | End: 2024-01-02 | Stop reason: SDUPTHER

## 2022-12-22 RX ORDER — METOPROLOL TARTRATE 25 MG/1
25 TABLET, FILM COATED ORAL 2 TIMES DAILY
Qty: 180 TABLET | Refills: 2 | Status: SHIPPED | OUTPATIENT
Start: 2022-12-22 | End: 2022-12-28

## 2022-12-22 NOTE — PROGRESS NOTES
CARDIOVASCULAR CONSULTATION    REASON FOR CONSULT:   Ade Castellano is a 77 y.o. female who presents for preoperative risk assessment.      HISTORY OF PRESENT ILLNESS:     Patient is a pleasant 74-year-old lady.  Here for preoperative risk assessment prior to lymph node resection.  She has a past medical history significant for coronary artery disease status post PCI in 2006.  Since then has been angina free.  Had a stress test performed in October of 2018 which was normal.  Denies any anginal sounding chest pains, orthopnea, PND.  Does have mild dyspnea on exertion, which she states has actually been improving since her stress test in 2018.  Denies any other cardiac anginal symptoms.    Notes from July 2020:  Patient here for follow-up.  States that underwent radiation therapy for her lymph nodes.  Has been told that her cancer is gone now.  States that lately has been noticing severe dyspnea on exertion.  Can only walk 10 ft and then has to stop.  Also has dry cough all the time.  Not related to postural or activity.  Denies any chest tightness but does have severe dyspnea on exertion.  Denies any orthopnea, PND.      Notes from August 2020:  Patient here for follow-up.  Stress test was abnormal.      The study shows abnormal myocardial perfusion.    Abnormal myocardial perfusion study.    Perfusion Defect There is a mild to moderate intensity, small to moderate sized, mostly reversible with some fixed areas defect in the anteroapical wall(s).    Gated perfusion images showed an ejection fraction of 71%    There is normal wall motion at rest and post stress.    The EKG portion of this study is negative for ischemia.    The patient reported no chest pain during the stress test.    There were no arrhythmias during stress.    The diary was not returned.  NSR. Heart rates varied between 47 and 89 bpm with an average of 58 bpm.  Rare PACs / PVCs     Normal left ventricular systolic function. The estimated  ejection fraction is 60%.  Grade II (moderate) left ventricular diastolic dysfunction consistent with pseudonormalization.  Normal right ventricular systolic function.  Mild to moderate left atrial enlargement.  Trivial pericardial effusion.  The estimated PA systolic pressure is 25 mmHg.  Normal central venous pressure (3 mmHg).      Notes from aug 20, 2020  Patient here for follow-up.  Denies any chest pains at rest on exertion, orthopnea, PND, swelling of feet.  No complications from cardiac catheterization.      Notes from April 2020:  Patient here for follow-up.  Denies any chest pains at rest on exertion, orthopnea, PND.  EKG done in the clinic personally reviewed and shows sinus bradycardia with T-wave inversions in V1 and V2.  Unchanged from prior EKG.  Has been doing fine.        LVEDP (Pre): 18  Estimated blood loss: <50 mL  Non-obstructive CAD.  No significant coronary artery stenosis. Luminal irregularities. Previously placed RCA stent is widely patent.      Notes from December 2020 patient here for follow-up.  Has been doing fine.  Denies any chest pains at rest on exertion, orthopnea, PND, swelling of feet.  No cardiac symptomatology.    Notes from October 2021:  Patient here follow-up.  Doing fine.  Denies any chest pains at rest on exertion, orthopnea, PND.  EKG done today personally reviewed and showed sinus rhythm, nonspecific ST abnormalities.    Notes from December 2022: Patient here for follow-up after a long hiatus.  Denies any chest pains at rest on exertion, orthopnea, PND    PAST MEDICAL HISTORY:     Past Medical History:   Diagnosis Date    Abnormal stress test 8/5/2020    Acute respiratory failure with hypoxia and hypercapnia 11/29/2017    Angina pectoris     Arthritis     Bell's palsy     left facial weakness    Breast cancer     RIGHT    CAD (coronary artery disease)     Cervical cancer     Chronic bronchitis     Chronic heart failure with preserved ejection fraction 8/5/2020    Chronic  heart failure with preserved ejection fraction 8/5/2020    COPD (chronic obstructive pulmonary disease)     Dr. Katz    Dental bridge present     Emphysema of lung     H/O colonoscopy 06/2017    due for repeat colonsocopy in 6/2018    History of Bell's palsy     History of heart artery stent     Dr. Ortiz  x2 stents    Hyperlipidemia     Hypertension     Lung cancer     Myocardial infarction     SUNITHA (obstructive sleep apnea)     intolerant to mask    SUNITHA (obstructive sleep apnea)     intolerant to mask     Pneumonia     Pneumonia due to other staphylococcus     PUD (peptic ulcer disease)     Severe anemia 6/11/2018    Sleep apnea     Vaginal delivery     x1       PAST SURGICAL HISTORY:     Past Surgical History:   Procedure Laterality Date    ADENOIDECTOMY      BREAST BIOPSY Right     x3    BREAST LUMPECTOMY      BREAST SURGERY      lumpectomy right side     CERVIX SURGERY      cone    COLONOSCOPY N/A 3/17/2017    Procedure: COLONOSCOPY;  Surgeon: Julio Rudd MD;  Location: [facility] ENDO;  Service: Endoscopy;  Laterality: N/A;    COLONOSCOPY N/A 6/30/2017    Procedure: COLONOSCOPY;  Surgeon: Julio Rudd MD;  Location: [facility] ENDO;  Service: Endoscopy;  Laterality: N/A;    COLONOSCOPY N/A 11/28/2018    Procedure: COLONOSCOPY;  Surgeon: Nura Urbina MD;  Location: [facility] ENDO;  Service: Endoscopy;  Laterality: N/A;    COLONOSCOPY N/A 1/29/2019    Procedure: COLONOSCOPY;  Surgeon: Emmanuel Perez MD;  Location: [facility] ENDO;  Service: Endoscopy;  Laterality: N/A;  confirmed appt-SP    COLONOSCOPY N/A 7/26/2022    Procedure: COLONOSCOPY;  Surgeon: James Healy MD;  Location: [facility] ENDO;  Service: Endoscopy;  Laterality: N/A;  fully [facility] -  mediport in [facility] -     CORONARY ANGIOPLASTY WITH STENT PLACEMENT      ESOPHAGOGASTRODUODENOSCOPY N/A 1/25/2021    Procedure: EGD (ESOPHAGOGASTRODUODENOSCOPY);  Surgeon: Dmitry Gonzalez MD;  Location: [facility] ENDO;  Service: Endoscopy;  Laterality: N/A;  rapid test >50 miles -ml     ESOPHAGOGASTRODUODENOSCOPY N/A 11/8/2022    Procedure: EGD (ESOPHAGOGASTRODUODENOSCOPY);  Surgeon: Tapan Setvenson MD;  Location: [facility] ENDO;  Service: Endoscopy;  Laterality: N/A;  w/dilation  fully vaccinated, instructions mailed-[facility]  11/4 pt called did not receive instructions, does not have portal or email, went over prep instructions and medication instructions with pt on the phone -LW    EYE SURGERY Bilateral 06/08/2018    cataract     LEFT HEART CATHETERIZATION Left 8/13/2020    Procedure: Left heart cath, rra, noon;  Surgeon: Guevara Skelton MD;  Location: [facility] CATH LAB;  Service: Cardiology;  Laterality: Left;  RN PREOP 8/7/2020---COVID NEGATIVE ON 8/12    PORTACATH PLACEMENT Right 01/2018    sweat glands axillary regions Bilateral     TONSILLECTOMY         ALLERGIES AND MEDICATION:   Review of patient's allergies indicates:  No Known Allergies     Medication List            Accurate as of December 22, 2022  9:39 AM. If you have any questions, ask your nurse or doctor.                CHANGE how you take these medications      rosuvastatin 10 MG tablet  Commonly known as: CRESTOR  TAKE 1 TABLET BY MOUTH EVERY DAY  What changed: when to take this            CONTINUE taking these medications      acetaminophen 650 MG Tbsr  Commonly known as: TYLENOL     * albuterol 90 mcg/actuation inhaler  Commonly known as: VENTOLIN HFA  INHALE 2 PUFF(S) BY MOUTH EVERY 4 - 6 HOURS AS NEEDED FOR SHORTNESS OF BREATH or WHEEZING     * albuterol 2.5 mg /3 mL (0.083 %) nebulizer solution  Commonly known as: PROVENTIL  NEBULIZE 1 vial EVERY 6 HOURS AS NEEDED FOR WHEEZING     aspirin 81 MG EC tablet  Commonly known as: ECOTRIN     carboxymethylcellulose 0.5 % Dpet  Commonly known as: REFRESH PLUS     fluticasone propionate 50 mcg/actuation nasal spray  Commonly known as: FLONASE  USE TWO SPRAYS IN EACH NOSTRIL ONCE A DAY     isosorbide mononitrate 30 MG 24 hr tablet  Commonly known as: IMDUR  Take 1 tablet (30 mg total) by mouth  once daily. Hold if SBP <120     levocetirizine 5 MG tablet  Commonly known as: XYZAL  Take 1 tablet (5 mg total) by mouth every evening.     meclizine 25 mg tablet  Commonly known as: ANTIVERT  Take 1 tablet (25 mg total) by mouth 2 (two) times daily as needed for Dizziness.     metoprolol tartrate 25 MG tablet  Commonly known as: LOPRESSOR  Take 1 tablet (25 mg total) by mouth 2 (two) times daily.     montelukast 10 mg tablet  Commonly known as: SINGULAIR  TAKE 1 TABLET BY MOUTH EVERY EVENING     nitroGLYCERIN 0.4 MG SL tablet  Commonly known as: NITROSTAT  PLACE 1 TAB UNDER TONGUE EVERY 5 MINUTES x 3 DOSES AS NEEDED FOR CHEST PAIN. IF NOT RESOLVED CALL 911     pantoprazole 40 MG tablet  Commonly known as: PROTONIX  Take 1 tablet (40 mg total) by mouth once daily.     sodium chloride 0.65 % nasal spray  Commonly known as: OCEAN     TRELEGY ELLIPTA 100-62.5-25 mcg Dsdv  Generic drug: fluticasone-umeclidin-vilanter           * This list has 2 medication(s) that are the same as other medications prescribed for you. Read the directions carefully, and ask your doctor or other care provider to review them with you.                  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     Drug use: No    Sexual activity: Not Currently       FAMILY HISTORY:     Family History   Problem Relation Age of Onset    Cancer Mother         breast    Heart disease Mother     Breast cancer Mother     Cancer Father         lung-smoker     Cancer Sister         lung-smoker     Heart attack Sister     Cancer Maternal Grandmother     Heart disease Maternal Grandmother     Cancer  Maternal Grandfather     Heart disease Maternal Grandfather     Cancer Paternal Grandmother     Cancer Paternal Grandfather     Cancer Sister         mets not sure where it started     COPD Sister     Heart disease Sister     Kidney cancer Son     Colon cancer Neg Hx     Esophageal cancer Neg Hx        REVIEW OF SYSTEMS:   Review of Systems   Constitutional: Positive for malaise/fatigue.   HENT: Negative.     Eyes: Negative.    Cardiovascular:  Positive for dyspnea on exertion.   Respiratory: Negative.     Endocrine: Negative.    Hematologic/Lymphatic: Negative.    Skin: Negative.    Musculoskeletal: Negative.    Gastrointestinal: Negative.    Genitourinary: Negative.    Neurological: Negative.    Psychiatric/Behavioral: Negative.     Allergic/Immunologic: Negative.      A 10 point review of systems was performed and all the pertinent positives have been mentioned. Rest of review of systems was negative.        PHYSICAL EXAM:     Vitals:    12/22/22 0900   BP: (!) 148/72   Pulse: (!) 55   Resp: 18    Body mass index is 28.25 kg/m².  Weight: 72.3 kg (159 lb 8 oz)         Physical Exam  Constitutional:       Appearance: Normal appearance. She is well-developed.   HENT:      Head: Normocephalic.   Eyes:      Pupils: Pupils are equal, round, and reactive to light.   Cardiovascular:      Rate and Rhythm: Normal rate and regular rhythm.   Pulmonary:      Effort: Pulmonary effort is normal.      Breath sounds: Normal breath sounds.   Abdominal:      General: Bowel sounds are normal.      Palpations: Abdomen is soft.      Tenderness: There is no abdominal tenderness.   Musculoskeletal:         General: Normal range of motion.      Cervical back: Normal range of motion and neck supple.   Skin:     General: Skin is warm.   Neurological:      Mental Status: She is alert and oriented to person, place, and time.         DATA:     Laboratory:  CBC:  Recent Labs   Lab 09/26/22  0736 11/16/22  0703 12/20/22  0714   WBC 4.53 6.05  4.13   Hemoglobin 13.3 12.4 13.7   Hematocrit 40.5 39.4 42.5   Platelets 219 295 208         CHEMISTRIES:  Recent Labs   Lab 06/15/22  0707 07/06/22  0820 07/19/22  0731 08/22/22  0748 09/26/22  0736 11/16/22  0703 12/20/22  0714   Glucose 121 H 114 H 129 H 120 H 123 H 134 H 117 H   Sodium 141 136 140 140 140 141 137   Potassium 3.3 L 3.6 4.3 4.1 3.4 L 3.3 L 3.6   BUN 21 13 13 19 18 16 23   Creatinine 1.2 0.9 0.9 0.9 1.0 0.9 0.9   eGFR if  51 A >60 >60  --   --   --   --    eGFR if non  44 A >60 >60  --   --   --   --    Calcium 9.4 9.7 9.7 9.4 9.3 9.5 9.5   Magnesium 1.6 1.4 L 1.5 L 1.6 1.6 1.6  --          CARDIAC BIOMARKERS:  Recent Labs   Lab 04/06/22  0941   Troponin I 0.019         COAGS:  Recent Labs   Lab 09/18/20  0925   INR 1.0         LIPIDS/LFTS:  Recent Labs   Lab 02/03/20  0730 02/28/20  0720 09/18/20  0925 10/12/20  0710 09/26/22  0736 10/17/22  0920 11/16/22  0703 12/20/22  0714   Cholesterol 125  --  139  --   --  138  --   --    Triglycerides 117  --  191 H  --   --  113  --   --    HDL 48  --  51  --   --  60  --   --    LDL Cholesterol 53.6 L  --  49.8 L  --   --  55.4 L  --   --    Non-HDL Cholesterol 77  --  88  --   --  78  --   --    AST 19   < > 24   < > 15  --  14 16   ALT 20   < > 17   < > 14  --  14 14    < > = values in this interval not displayed.         Hemoglobin A1C   Date Value Ref Range Status   04/07/2022 5.9 (H) 4.0 - 5.6 % Final     Comment:     ADA Screening Guidelines:  5.7-6.4%  Consistent with prediabetes  >or=6.5%  Consistent with diabetes    High levels of fetal hemoglobin interfere with the HbA1C  assay. Heterozygous hemoglobin variants (HbS, HgC, etc)do  not significantly interfere with this assay.   However, presence of multiple variants may affect accuracy.     04/05/2021 6.0 (H) 4.0 - 5.6 % Final     Comment:     ADA Screening Guidelines:  5.7-6.4%  Consistent with prediabetes  >or=6.5%  Consistent with diabetes    High levels of fetal  hemoglobin interfere with the HbA1C  assay. Heterozygous hemoglobin variants (HbS, HgC, etc)do  not significantly interfere with this assay.   However, presence of multiple variants may affect accuracy.     02/03/2020 6.1 (H) 4.0 - 5.6 % Final     Comment:     ADA Screening Guidelines:  5.7-6.4%  Consistent with prediabetes  >or=6.5%  Consistent with diabetes  High levels of fetal hemoglobin interfere with the HbA1C  assay. Heterozygous hemoglobin variants (HbS, HgC, etc)do  not significantly interfere with this assay.   However, presence of multiple variants may affect accuracy.         TSH  Recent Labs   Lab 08/22/22  0748 09/26/22  0736 11/16/22  0703   TSH 2.739 3.036 2.144         The ASCVD Risk score (Brandie BRITO, et al., 2019) failed to calculate for the following reasons:    The patient has a prior MI or stroke diagnosis           Cardiovascular Testing:    EKG: (personally reviewed tracing)  Sinus bradycardia      ASSESSMENT AND PLAN     Patient Active Problem List   Diagnosis    Coronary artery disease of native artery of native heart with stable angina pectoris    Hyperlipidemia LDL goal <70    Old myocardial infarction    Stable angina    Atherosclerosis of aorta    Prediabetes    DNR (do not resuscitate)    COPD (chronic obstructive pulmonary disease)    Squamous cell carcinoma lung    Metastatic breast cancer    Hypertension    LOGAN (dyspnea on exertion)    Chronic heart failure with preserved ejection fraction    Dysphagia    Stage 3 severe COPD by GOLD classification    Cancer of overlapping sites of lung    Secondary malignant neoplasm of unspecified site (CODE)    Dependence on supplemental oxygen    Hypokalemia    Generalized weakness    Orthostatic hypotension     Patient with a past medical history of coronary artery disease. .  Past medical history significant for lung cancer.  No significant coronary artery disease on coronary angiogram had luminal irregularities.    Heart failure with preserved  ejection fraction    Squamous cell carcinoma of lung    Breast cancer    Aortic arch atherosclerosis        Follow-up in Cardiology Clinic in 4 m            Thank you very much for involving me in the care of your patient.  Please do not hesitate to contact me if there are any questions.      Guevara Skelton MD, FACC, [facility]  Interventional Cardiologist, [facility].           This note was dictated with the help of speech recognition software.  There might be un-intended errors and/or substitutions.

## 2023-01-02 NOTE — NURSING
1935: Feeding started at 20ml/hr with 100ml q4h flushes Received patient to room 340A. Patient on 2L NC. V/S stable. Reports no pain. No concerns at this time. Will continue to monitor.    Audra Brewster RN  12/2/2017  9:55 AM

## 2023-01-03 ENCOUNTER — TELEPHONE (OUTPATIENT)
Dept: HEMATOLOGY/ONCOLOGY | Facility: CLINIC | Age: 78
End: 2023-01-03
Payer: MEDICARE

## 2023-01-03 ENCOUNTER — TELEPHONE (OUTPATIENT)
Dept: SURGERY | Facility: CLINIC | Age: 78
End: 2023-01-03
Payer: MEDICARE

## 2023-01-03 NOTE — TELEPHONE ENCOUNTER
Tc rec'd from pt c/o discoloration of rt br She stated yesterday it was a dark beige-grayish color  This am it is a little lighter in color  She denies fever, swelling in neck,axillary area,or upper extremity and states no pain,r tenderness or discharge She denies trauma Just concerned that her Br ca may have returned.   Discussed with Dr acuña She advised that she would like her to see Dr Fierro on Monday jan 9th if possible Also advised her that if discoloration becomes worse or if she develops fever ,swelling in neck, axillary area, upper extremity , discharge or any other abnormal symptoms to present to ER at [facility] immediately     Above message relayed to pt  informed her that we would send a message to Dr Fierro's office requesting she be seen on Jan 9 th if possible and  either we would contact her with that appointment or her office would reach out to her  She acknowledged understanding   Message sent to Dr Fierro's nurse on 1-3-23    Were you able to get cameron chilel scheduled for Dr Fierro on monday? her MRN Is [id number]  I sent a message on her yesterday  I know it was very  busy yesterday Alphonse tinoco

## 2023-01-03 NOTE — TELEPHONE ENCOUNTER
Message received from DURAN Rosenberg from Dr. Holliday's office. Pt would like to monitor breast changes and call in if she would like to be scheduled for an appt.

## 2023-01-03 NOTE — TELEPHONE ENCOUNTER
Patient called to talk to [facility]. Informed patient that [facility] was at lunch. Patient requested a call back from [facility].

## 2023-01-11 ENCOUNTER — HOSPITAL ENCOUNTER (OUTPATIENT)
Dept: RADIOLOGY | Facility: HOSPITAL | Age: 78
Discharge: HOME | End: 2023-01-11
Attending: INTERNAL MEDICINE
Payer: MEDICARE

## 2023-01-11 DIAGNOSIS — R93.89 ABNORMAL CT SCAN: ICD-10-CM

## 2023-01-11 DIAGNOSIS — C50.919 RECURRENT BREAST CANCER, UNSPECIFIED LATERALITY: ICD-10-CM

## 2023-01-11 LAB — POCT GLUCOSE: 88 MG/DL (ref 70–110)

## 2023-01-11 PROCEDURE — 78815 PET IMAGE W/CT SKULL-THIGH: CPT | Mod: TC,HCNC,PS

## 2023-01-11 PROCEDURE — 78815 NM PET CT ROUTINE: ICD-10-PCS | Mod: 26,HCNC,PS, | Performed by: RADIOLOGY

## 2023-01-11 PROCEDURE — 78815 PET IMAGE W/CT SKULL-THIGH: CPT | Mod: 26,HCNC,PS, | Performed by: RADIOLOGY

## 2023-01-12 ENCOUNTER — OFFICE VISIT (OUTPATIENT)
Dept: GASTROENTEROLOGY | Facility: CLINIC | Age: 78
End: 2023-01-12
Payer: MEDICARE

## 2023-01-12 VITALS
HEIGHT: 63 IN | BODY MASS INDEX: 28.21 KG/M2 | HEART RATE: 70 BPM | SYSTOLIC BLOOD PRESSURE: 118 MMHG | WEIGHT: 159.19 LBS | DIASTOLIC BLOOD PRESSURE: 68 MMHG

## 2023-01-12 DIAGNOSIS — R13.10 DYSPHAGIA, UNSPECIFIED TYPE: Primary | ICD-10-CM

## 2023-01-12 PROCEDURE — 3288F PR FALLS RISK ASSESSMENT DOCUMENTED: ICD-10-PCS | Mod: HCNC,CPTII,S$GLB, | Performed by: INTERNAL MEDICINE

## 2023-01-12 PROCEDURE — 1159F MED LIST DOCD IN RCRD: CPT | Mod: HCNC,CPTII,S$GLB, | Performed by: INTERNAL MEDICINE

## 2023-01-12 PROCEDURE — 1126F AMNT PAIN NOTED NONE PRSNT: CPT | Mod: HCNC,CPTII,S$GLB, | Performed by: INTERNAL MEDICINE

## 2023-01-12 PROCEDURE — 1157F ADVNC CARE PLAN IN RCRD: CPT | Mod: HCNC,CPTII,S$GLB, | Performed by: INTERNAL MEDICINE

## 2023-01-12 PROCEDURE — 3074F PR MOST RECENT SYSTOLIC BLOOD PRESSURE < 130 MM HG: ICD-10-PCS | Mod: HCNC,CPTII,S$GLB, | Performed by: INTERNAL MEDICINE

## 2023-01-12 PROCEDURE — 3074F SYST BP LT 130 MM HG: CPT | Mod: HCNC,CPTII,S$GLB, | Performed by: INTERNAL MEDICINE

## 2023-01-12 PROCEDURE — 99999 PR PBB SHADOW E&M-EST. PATIENT-LVL V: ICD-10-PCS | Mod: PBBFAC,HCNC,, | Performed by: INTERNAL MEDICINE

## 2023-01-12 PROCEDURE — 3288F FALL RISK ASSESSMENT DOCD: CPT | Mod: HCNC,CPTII,S$GLB, | Performed by: INTERNAL MEDICINE

## 2023-01-12 PROCEDURE — 1157F PR ADVANCE CARE PLAN OR EQUIV PRESENT IN MEDICAL RECORD: ICD-10-PCS | Mod: HCNC,CPTII,S$GLB, | Performed by: INTERNAL MEDICINE

## 2023-01-12 PROCEDURE — 3078F DIAST BP <80 MM HG: CPT | Mod: HCNC,CPTII,S$GLB, | Performed by: INTERNAL MEDICINE

## 2023-01-12 PROCEDURE — 1159F PR MEDICATION LIST DOCUMENTED IN MEDICAL RECORD: ICD-10-PCS | Mod: HCNC,CPTII,S$GLB, | Performed by: INTERNAL MEDICINE

## 2023-01-12 PROCEDURE — 3078F PR MOST RECENT DIASTOLIC BLOOD PRESSURE < 80 MM HG: ICD-10-PCS | Mod: HCNC,CPTII,S$GLB, | Performed by: INTERNAL MEDICINE

## 2023-01-12 PROCEDURE — 1101F PR PT FALLS ASSESS DOC 0-1 FALLS W/OUT INJ PAST YR: ICD-10-PCS | Mod: HCNC,CPTII,S$GLB, | Performed by: INTERNAL MEDICINE

## 2023-01-12 PROCEDURE — 99999 PR PBB SHADOW E&M-EST. PATIENT-LVL V: CPT | Mod: PBBFAC,HCNC,, | Performed by: INTERNAL MEDICINE

## 2023-01-12 PROCEDURE — 99214 PR OFFICE/OUTPT VISIT, EST, LEVL IV, 30-39 MIN: ICD-10-PCS | Mod: HCNC,S$GLB,, | Performed by: INTERNAL MEDICINE

## 2023-01-12 PROCEDURE — 1101F PT FALLS ASSESS-DOCD LE1/YR: CPT | Mod: HCNC,CPTII,S$GLB, | Performed by: INTERNAL MEDICINE

## 2023-01-12 PROCEDURE — 1126F PR PAIN SEVERITY QUANTIFIED, NO PAIN PRESENT: ICD-10-PCS | Mod: HCNC,CPTII,S$GLB, | Performed by: INTERNAL MEDICINE

## 2023-01-12 PROCEDURE — 99214 OFFICE O/P EST MOD 30 MIN: CPT | Mod: HCNC,S$GLB,, | Performed by: INTERNAL MEDICINE

## 2023-01-12 NOTE — PROGRESS NOTES
[facility]   Clinic Note              Patient Name: Ade Castellano  Age: 77 y.o.  Sex: female  MRN: [id number]    TODAY'S DATE:  1/12/2023  REFERRING PROVIDER:  No ref. provider found     Diagnosis:   1. Dysphagia, unspecified type        HPI:  Ade Castellano is a 77 y.o. female with a history of COPD, CAD, breast cancer '15, lung cancer s/p chemo/radiation who was referred for evaluation and treatment of dysphagia.    For review, patient has been seen by various GI providers in the past, most recently Dr. Healy in October of 2022, at which time she endorsed dysphagia to solids and liquids.  Plan for endoscopy was made, which was done in November with results as below.    Today patient confirms the above, noting that since her dilation her dysphagia has mildly improved, but remains present.  Her symptoms are particularly bad with rice and bread.      PRIOR ENDOSCOPY:  -Colonoscopy:     07/26/22:    Impression:            - Five 3 to 4 mm polyps at the hepatic flexure and                          in the ascending colon, removed with a jumbo cold                          forceps. Resected and retrieved.                          - One 7 mm polyp in the descending colon, removed                          with a cold snare. Resected and retrieved.                          - One 4 mm polyp in the descending colon, removed                          with a cold snare. Resected and retrieved.                          - Non-bleeding external hemorrhoids.   Recommendation:        - Repeat colonoscopy is not recommended due to                          current age (66 years or older) for surveillance.                          - Patient has a contact number available for                          emergencies. The signs and symptoms of potential                          delayed complications were discussed with the                          patient. Return to normal activities tomorrow.                           Written discharge instructions were provided to                          the patient.                          - Discharge patient to home.                          - Await pathology results.     -EGD - 11/08/22:  Findings:        Corkscrew esophagus, tertiary contractions        Hypertonic LES, A guidewire was placed and the scope was withdrawn.        Dilation was performed with a Savary dilator with mild resistance at        48 Fr. The dilation site was examined following endoscope        reinsertion and showed no change.        Diffuse atrophic mucosa was found in the entire examined stomach.        Biopsies were taken with a cold forceps for histology.        The examined duodenum was normal.   Impression:            - Gastric mucosal atrophy. Biopsied.                          - Normal examined duodenum.   Recommendation:        - Patient has a contact number available for                          emergencies. The signs and symptoms of potential                          delayed complications were discussed with the                          patient. Return to normal activities tomorrow.                          Written discharge instructions were provided to                          the patient.                          - Discharge patient to home (ambulatory).                          - Resume previous diet.                          - Continue present medications.                          - Await pathology results.                          - Return to GI clinic at appointment to be                          scheduled. the dysphagia may be due to motility                          disorder and additional workup may be helpful         ROS: Negative other than above      Review of patient's allergies indicates:  No Known Allergies    Outpatient Medications Marked as Taking for the 1/12/23 encounter (Office Visit) with Miller Phillips MD   Medication Sig Dispense Refill    acetaminophen (TYLENOL) 650 MG TbSR  Take 650 mg by mouth every 8 (eight) hours.      albuterol (PROVENTIL) 2.5 mg /3 mL (0.083 %) nebulizer solution NEBULIZE 1 vial EVERY 6 HOURS AS NEEDED FOR WHEEZING 540 mL 1    albuterol (VENTOLIN HFA) 90 mcg/actuation inhaler INHALE 2 PUFF(S) BY MOUTH EVERY 4 - 6 HOURS AS NEEDED FOR SHORTNESS OF BREATH or WHEEZING 18 g 5    aspirin (ECOTRIN) 81 MG EC tablet Take 81 mg by mouth once daily.       carboxymethylcellulose (REFRESH PLUS) 0.5 % Dpet 1 drop 3 (three) times daily as needed.      fluticasone propionate (FLONASE) 50 mcg/actuation nasal spray USE TWO SPRAYS IN EACH NOSTRIL ONCE A DAY 16 g 5    isosorbide mononitrate (IMDUR) 30 MG 24 hr tablet Take 1 tablet (30 mg total) by mouth once daily. Hold if SBP <120 30 tablet 11    levocetirizine (XYZAL) 5 MG tablet Take 1 tablet (5 mg total) by mouth every evening. 30 tablet 11    meclizine (ANTIVERT) 25 mg tablet take 1 TABLET(S) BY MOUTH TWICE DAILY AS NEEDED for dizziness 30 tablet 2    metoprolol tartrate (LOPRESSOR) 25 MG tablet TAKE 1 TABLET BY MOUTH TWICE A DAY FOR BLOOD PRESSURE 180 tablet 2    nitroGLYCERIN (NITROSTAT) 0.4 MG SL tablet PLACE 1 TAB UNDER TONGUE EVERY 5 MINUTES x 3 DOSES AS NEEDED FOR CHEST PAIN. IF NOT RESOLVED CALL 911 90 tablet 0    pantoprazole (PROTONIX) 40 MG tablet Take 1 tablet (40 mg total) by mouth once daily. 30 tablet 11    rosuvastatin (CRESTOR) 10 MG tablet Take 1 tablet (10 mg total) by mouth once daily. 30 tablet 11    sodium chloride (OCEAN) 0.65 % nasal spray 1 spray by Nasal route as needed for Congestion.      TRELEGY ELLIPTA 100-62.5-25 mcg DsDv INHALE ONE PUFF INTO THE LUNGS ONCE A DAY  5       Past Medical History:   Diagnosis Date    Abnormal stress test 8/5/2020    Acute respiratory failure with hypoxia and hypercapnia 11/29/2017    Angina pectoris     Arthritis     Bell's palsy     left facial weakness    Breast cancer     RIGHT    CAD (coronary artery disease)     Cervical cancer     Chronic bronchitis     Chronic  heart failure with preserved ejection fraction 8/5/2020    Chronic heart failure with preserved ejection fraction 8/5/2020    COPD (chronic obstructive pulmonary disease)     Dr. Katz    Dental bridge present     Emphysema of lung     H/O colonoscopy 06/2017    due for repeat colonsocopy in 6/2018    History of Bell's palsy     History of heart artery stent     Dr. Ortiz  x2 stents    Hyperlipidemia     Hypertension     Lung cancer     Myocardial infarction     SUNITHA (obstructive sleep apnea)     intolerant to mask    SUNITHA (obstructive sleep apnea)     intolerant to mask     Pneumonia     Pneumonia due to other staphylococcus     PUD (peptic ulcer disease)     Severe anemia 6/11/2018    Sleep apnea     Vaginal delivery     x1       Past Surgical History:   Procedure Laterality Date    ADENOIDECTOMY      BREAST BIOPSY Right     x3    BREAST LUMPECTOMY      BREAST SURGERY      lumpectomy right side     CERVIX SURGERY      cone    COLONOSCOPY N/A 3/17/2017    Procedure: COLONOSCOPY;  Surgeon: Julio Rudd MD;  Location: [facility];  Service: Endoscopy;  Laterality: N/A;    COLONOSCOPY N/A 6/30/2017    Procedure: COLONOSCOPY;  Surgeon: Julio Rudd MD;  Location: [facility] ENDO;  Service: Endoscopy;  Laterality: N/A;    COLONOSCOPY N/A 11/28/2018    Procedure: COLONOSCOPY;  Surgeon: Nura Urbina MD;  Location: [facility] ENDO;  Service: Endoscopy;  Laterality: N/A;    COLONOSCOPY N/A 1/29/2019    Procedure: COLONOSCOPY;  Surgeon: Emmanuel Perez MD;  Location: [facility] ENDO;  Service: Endoscopy;  Laterality: N/A;  confirmed appt-SP    COLONOSCOPY N/A 7/26/2022    Procedure: COLONOSCOPY;  Surgeon: James Healy MD;  Location: [facility] ENDO;  Service: Endoscopy;  Laterality: N/A;  [facility] [place]    CORONARY ANGIOPLASTY WITH STENT PLACEMENT      ESOPHAGOGASTRODUODENOSCOPY N/A 1/25/2021    Procedure: EGD (ESOPHAGOGASTRODUODENOSCOPY);  Surgeon: Dmitry Gonzalez MD;  Location: [facility];  Service: Endoscopy;   "Laterality: N/A;  rapid test >50 miles -ml    ESOPHAGOGASTRODUODENOSCOPY N/A 2022    Procedure: EGD (ESOPHAGOGASTRODUODENOSCOPY);  Surgeon: Tapan Stevenson MD;  Location: [facility] ENDO;  Service: Endoscopy;  Laterality: N/A;  w/dilation  fully vaccinated, instructions mailed-Kpvt   pt called did not receive instructions, does not have portal or email, went over prep instructions and medication instructions with pt on the phone -LW    EYE SURGERY Bilateral 2018    cataract     LEFT HEART CATHETERIZATION Left 2020    Procedure: Left heart cath, rra, noon;  Surgeon: Guevara Skelton MD;  Location: [facility] CATH LAB;  Service: Cardiology;  Laterality: Left;  RN PREOP 2020---COVID NEGATIVE ON     PORTACATH PLACEMENT Right 2018    sweat glands axillary regions Bilateral     TONSILLECTOMY         Family History   Problem Relation Age of Onset    Cancer Mother         breast    Heart disease Mother     Breast cancer Mother     Cancer Father         lung-smoker     Cancer Sister         lung-smoker     Heart attack Sister     Cancer Maternal Grandmother     Heart disease Maternal Grandmother     Cancer Maternal Grandfather     Heart disease Maternal Grandfather     Cancer Paternal Grandmother     Cancer Paternal Grandfather     Cancer Sister         mets not sure where it started     COPD Sister     Heart disease Sister     Kidney cancer Son     Colon cancer Neg Hx     Esophageal cancer Neg Hx        Social History     Socioeconomic History    Marital status: Single   Tobacco Use    Smoking status: Former     Packs/day: 0.25     Years: 50.00     Pack years: 12.50     Types: Cigarettes     Quit date: 2017     Years since quittin.1    Smokeless tobacco: Never    Tobacco comments:     7 years since smoked    Substance and Sexual Activity    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 Good eye contact     Labs:   No results found for: CRP, CALPROTECTIN  Lab Results   Component Value Date    HEPBSAG Negative 10/22/2014     No results found for: TBGOLDPLUS  Lab Results   Component Value Date    HSXUXHWT48GS 38 11/30/2015     Lab Results   Component Value Date    WBC 4.13 12/20/2022    HGB 13.7 12/20/2022    HCT 42.5 12/20/2022    MCV 91 12/20/2022     12/20/2022     Lab Results   Component Value Date    CREATININE 0.9 12/20/2022    ALBUMIN 4.1 12/20/2022    BILITOT 0.5 12/20/2022    ALKPHOS 57 12/20/2022    AST 16 12/20/2022    ALT 14 12/20/2022       Assessment/Plan:  Ade Castellano is a 77 y.o. female with a history of COPD, CAD, breast cancer '15, lung cancer s/p chemo/radiation who was referred for evaluation and treatment of dysphagia.    # Dysphagia, unspecified type [R13.10]    Her endoscopy and symptoms are concerning for achalasia, or given her history of malignancy pseudo achalasia.  I recommended that we proceed with manometry, but she is unsure that she will be able to cross that bridge to [facility].  She is amenable to SLP to ensure no oropharyngeal aspect of her dysphagia as well, which I will place a referral for today    RTC 6 mo    Thank you for involving us in the care of this patient.        Milelr Phillips MD  Department of Gastroenterology & Hepatology

## 2023-01-24 ENCOUNTER — CLINICAL SUPPORT (OUTPATIENT)
Dept: REHABILITATION | Facility: HOSPITAL | Age: 78
End: 2023-01-24
Attending: INTERNAL MEDICINE
Payer: MEDICARE

## 2023-01-24 DIAGNOSIS — R13.10 DYSPHAGIA, UNSPECIFIED TYPE: ICD-10-CM

## 2023-01-24 PROCEDURE — 92612 ENDOSCOPY SWALLOW (FEES) VID: CPT | Mod: HCNC,PN | Performed by: SPEECH-LANGUAGE PATHOLOGIST

## 2023-01-24 PROCEDURE — 92610 EVALUATE SWALLOWING FUNCTION: CPT | Mod: HCNC,PN | Performed by: SPEECH-LANGUAGE PATHOLOGIST

## 2023-01-24 NOTE — PLAN OF CARE
[facility]  Fiberoptic Endoscopic Evaluation of Swallowing (FEES)    Date: 1/24/2023     Name: Ade Castellano   MRN: [id number]    Therapy Diagnosis:   Encounter Diagnosis   Name Primary?    Dysphagia, unspecified type       Physician: Miller Phillips MD  Physician Orders: [id number] - AMB REFERRAL/CONSULT TO SPEECH THERAPY; Clinical Swallowing Evaluation; Fiberoptic Endoscopic Evaluation of Swallowing   Order Date: 1/12/23   Medical Diagnosis from Referral: R13.10 (ICD-10-CM) - Dysphagia, unspecified type    Visit #/Visits authorized: 1/ 1  Date of Evaluation:  1/24/2023   Insurance Authorization Period: 1/12/23-1/12/24   Plan of Care Expiration: Evaluation Only    Time In: 8:33 AM  Time Out: 9:25 AM    Procedure Min.   Flexible fiberoptic endoscopic evaluation of  swallowing by cine or video recording (CPT 53212)  6   Swallow and oral function evaluation (CPT 79547) 46      Precautions:Standard  Subjective   Date of Onset: Multiple years ago; unable to recall onset   History of Current Condition:  Ade Castellano was referred by Dr. Phillips for a FEES (CPT 55578) to objectively assess anatomy and physiology of the pharyngeal swallow, rule out silent aspiration, determine the safest possible diet, and examine the effectiveness of compensatory strategies. Patient's current nutritional avenue is oral. Patient is currently on a The International Dysphagia Diet Standardisation Initiative level 0 liquid diet consistency; The International Dysphagia Diet Standardisation Initiative level 7 food diet consistency and presents with sensation of food sticking in his throat. Esophageal dilation twice; most recent within the past couple months.  Slows down rate and small bites; liquid wash is not helpful  Endorses reflux, manages via medication. Endorses history of breast cancer (2015) and lung cancer (2016), treated with chemotherapy and radiation. Some weight loss due to loss of appetite.  Patient with history of Alpha Palsy which has limited her range of motion in the left side of her face but has not impacted oral intake.   History was provided by patient and/or taken from chart review:   -Current diet at home: The International Dysphagia Diet Standardisation Initiative level 0; The International Dysphagia Diet Standardisation Initiative level 7 (regular/thin)  -Neurological: Pt denied any neurological diagnoses.  -Gastroenterologist (GI) : Pt endorsed GI diagnosis of UES Dilation (completed within the past few months). Pt endorsed GERD, manages via medication.   -Pulmonary: Pt endorsed pulmonary diagnosis of COPD. Pt denied all other pulmonary diagnoses.  -Cancer: Patient with history of breast cancer and lung caner, treated via chemotherapy and radiation  -Surgery:   Past Surgical History:   Procedure Laterality Date    ADENOIDECTOMY      BREAST BIOPSY Right     x3    BREAST LUMPECTOMY      BREAST SURGERY      lumpectomy right side     CERVIX SURGERY      cone    COLONOSCOPY N/A 3/17/2017    Procedure: COLONOSCOPY;  Surgeon: Julio Rudd MD;  Location: [facility] ENDO;  Service: Endoscopy;  Laterality: N/A;    COLONOSCOPY N/A 6/30/2017    Procedure: COLONOSCOPY;  Surgeon: Julio Rudd MD;  Location: [facility] ENDO;  Service: Endoscopy;  Laterality: N/A;    COLONOSCOPY N/A 11/28/2018    Procedure: COLONOSCOPY;  Surgeon: Nura Urbina MD;  Location: [facility] ENDO;  Service: Endoscopy;  Laterality: N/A;    COLONOSCOPY N/A 1/29/2019    Procedure: COLONOSCOPY;  Surgeon: Emmanuel Perez MD;  Location: [facility] ENDO;  Service: Endoscopy;  Laterality: N/A;  confirmed appt-SP    COLONOSCOPY N/A 7/26/2022    Procedure: COLONOSCOPY;  Surgeon: James Healy MD;  Location: [facility] ENDO;  Service: Endoscopy;  Laterality: N/A;  fully vaccinated -sm  mediport in chest - sm    CORONARY ANGIOPLASTY WITH STENT PLACEMENT      ESOPHAGOGASTRODUODENOSCOPY N/A 1/25/2021    Procedure: EGD (ESOPHAGOGASTRODUODENOSCOPY);  Surgeon:  Dmitry Gonzalez MD;  Location: [facility] ENDO;  Service: Endoscopy;  Laterality: N/A;  rapid test >50 miles -ml    ESOPHAGOGASTRODUODENOSCOPY N/A 11/8/2022    Procedure: EGD (ESOPHAGOGASTRODUODENOSCOPY);  Surgeon: Tapan Stevenson MD;  Location: [facility] ENDO;  Service: Endoscopy;  Laterality: N/A;  w/dilation  fully vaccinated, instructions mailed-Kpvt  11/4 pt called did not receive instructions, does not have portal or email, went over prep instructions and medication instructions with pt on the phone -LW    EYE SURGERY Bilateral 06/08/2018    cataract     LEFT HEART CATHETERIZATION Left 8/13/2020    Procedure: Left heart cath, rra, noon;  Surgeon: Guevara Skelton MD;  Location: [facility] CATH LAB;  Service: Cardiology;  Laterality: Left;  RN PREOP 8/7/2020---COVID NEGATIVE ON 8/12    PORTACATH PLACEMENT Right 01/2018    sweat glands axillary regions Bilateral     TONSILLECTOMY         The following observations were made:   -Mental status: Alert and Cooperative  -Factors affecting performance: no difficulties participating in the study  -Feeding Method: independent in self-feeding    Respiratory Status: room air    Past Medical History: Ade Castellano  has a past medical history of Abnormal stress test (8/5/2020), Acute respiratory failure with hypoxia and hypercapnia (11/29/2017), Angina pectoris, Arthritis, Bell's palsy, Breast cancer, CAD (coronary artery disease), Cervical cancer, Chronic bronchitis, Chronic heart failure with preserved ejection fraction (8/5/2020), Chronic heart failure with preserved ejection fraction (8/5/2020), COPD (chronic obstructive pulmonary disease), Dental bridge present, Emphysema of lung, H/O colonoscopy (06/2017), History of Bell's palsy, History of heart artery stent, Hyperlipidemia, Hypertension, Lung cancer, Myocardial infarction, SUNITHA (obstructive sleep apnea), SUNITHA (obstructive sleep apnea), Pneumonia, Pneumonia due to other staphylococcus, PUD (peptic ulcer disease), Severe anemia  "(6/11/2018), Sleep apnea, and Vaginal delivery.  Ade Castellano  has a past surgical history that includes Cervix surgery; Breast surgery; Tonsillectomy; Adenoidectomy; sweat glands axillary regions (Bilateral); Colonoscopy (N/A, 3/17/2017); Breast biopsy (Right); Colonoscopy (N/A, 6/30/2017); Portacath placement (Right, 01/2018); Colonoscopy (N/A, 11/28/2018); Colonoscopy (N/A, 1/29/2019); Eye surgery (Bilateral, 06/08/2018); Breast lumpectomy; Left heart catheterization (Left, 8/13/2020); Esophagogastroduodenoscopy (N/A, 1/25/2021); Coronary angioplasty with stent; Colonoscopy (N/A, 7/26/2022); and Esophagogastroduodenoscopy (N/A, 11/8/2022).  Medical Hx and Allergies:  Ade has a current medication list which includes the following prescription(s): acetaminophen, albuterol, albuterol, aspirin, carboxymethylcellulose, fluticasone propionate, isosorbide mononitrate, levocetirizine, meclizine, metoprolol tartrate, montelukast, nitroglycerin, pantoprazole, rosuvastatin, sodium chloride, and trelegy ellipta. Review of patient's allergies indicates:  No Known Allergies  [facility] "[facility]:   New right lower lobe infiltrate worrisome for pneumonia or aspiration.   Chronic infiltrate right middle lobe.   Resolution of the left lower lobe infiltrate.   Coronary artery calcifications."  Pneumonia History: Yes - patient unable to recall when   Previous MBSS:  No  Previous FEES:   No  Prior Therapy:  No prior history of speech therapy per patient report   Social History:  Patient lives alone. Patient is retired.    Pain:   0/10  Pain Location / Description: none indicated   Patient's Therapy Goals:  assess swallowing  Objective     Procedure: A flexible fiberoptic nasoendoscope was passed transnasally to view the nasopharynx, oropharynx, hypopharynx, and larynx. 22 swallow trials using 1/2 teaspoon to 3 ounce amounts of real foods of thin liquid, nectar-thick liquid, puree, soft-mechanical, and solid " consistencies were video recorded and analyzed using transnasal endoscopy. A clinical swallow evaluation (CPT 11181) was completed in conjunction with the FEES in order to evaluate the oral structures required for functional swallowing.   Scope Time: 5 minutes 48 seconds      Results revealed:    Cranial Nerve Examination  Cranial Nerve 5: Trigeminal Nerve  Motor Jaw Posture at rest: Closed  Mandible Elevation/Depression: WFL  Mandible lateralization: WFL  Abnormal movement: absent Interpretation: within functional limits    Sensory Forehead: WFL  Cheek: WFL  Jaw: WFL  Facial Pain: None noted Interpretation: within functional limits      Cranial Nerve 7: Facial Nerve  Motor Facial Symmetry: lip corner droop-left and asymmetry  Wrinkle Forehead: Deviation-left side  Close eyes tightly: Deviation-left side  Labial Protrusion: Droop to left  Labial Retraction: Deviation-left side  Abnormal movement: absent Interpretation: unable to rule out cranial nerve involvement    Sensory Formal testing not completed. Reduced taste due to chemotherapy per patient report      Cranial Nerves IX and X: Glossopharyngeal and Vagus Nerves  Motor Palatal Symmetry (Rest): WNL  Palatal Symmetry (Movement): WNL  Cough: Perceptually strong  Voice Prior to PO intake: Clear  Resonance: Normal  Abnormal movement: absent Interpretation: unable to rule out cranial nerve involvement      Cranial Nerve XII: Hypoglossal Nerve  Motor Tongue at rest: WNL  Lingual Protrusion: Deviation  left  Lingual Protrusion against Resistance: WNL  Lingual Lateralization: Inability to move left; decreased ROM  Abnormal movement: absent Interpretation: unable to rule out cranial nerve involvement      Other information:  Volitional Swallow: Able to palpate laryngeal rise  Mucosal Quality: Xerostomia  Secretion Management: within functional limits   Dentition: Good condition for speech and mastication, Upper dentures, and Lower dentures     Nasopharyngoscopic,  pharyngoscopic, and laryngeal findings:  Nasopharyngoscopic Findings Velopharyngeal function WFL    Anatomic findings WNL   Pharyngoscopic & laryngoscopic findings Secretions mild secretions in valleculae and pharyngeal walls    Vocal fold motion WFL- complete bilateral vocal fold abduction/adduction    Sensory integrity Appears functional- swallow initiated to penetration material, cough or throat clear to aspiration, and/or spontaneous swallow to significant residue    Anatomic findings Within normal limits       Consistency  Presentation  Findings Rosenbeck's Penetration/Aspiration Scale (PAS) Strategies   Thin (IDDSI 0) 1/2 teaspoon x1  Full teaspoon x2  Self-regulated cup sip x3    Self-regulated straw sip x3   Consecutive straw sip x1   Vallecular residue: none to trace residue bilaterally; cleared with subsequent swallow    Pyriform sinus residue: none present    Other residue: none present Best: (1) Material does not enter the airway and (4) Material enters the airway, contacts the vocal folds, and is ejected from the airway  8/10 trials    Worst: (4) Material enters the airway, contacts the vocal folds, and is ejected from the airway  Penetration occurred before and during the swallow over rim of the epiglottis    Spontaneous/reflexive swallows/throat clear-cleared residue and material ejected from airway   Nectar thick (mildly thick/IDDSI 2) 1/2 teaspoon x1  Full teaspoon x2   Vallecular residue: none present    Pyriform sinus residue: none present    Other residue: none present Best: (1) Material does not enter the airway    Worst: (1) Material does not enter the airway   None completed nor needed    Puree (extremely thick/IDDSI 4) 1/2 teaspoon x1  Full teaspoon x2  Heaping Teaspoon x1 Vallecular residue: none to trace residue bilaterally; cleared with subsequent swallow    Pyriform sinus residue: trace residue on right side; cleared with subsequent swallow    Other residue: trace residue on left and  right aryepiglottic folds; leared with subsequent swallow Best: (1) Material does not enter the airway    Worst: (1) Material does not enter the airway     Spontaneous/reflexive swallows-effective in clearing residue      Mixed consistency (thin/ IDDSI 0 + soft and bite sized/ IDDSI 6) - 1 peach in juice x1  - 2 peaches in juice x2     Vallecular residue: none present    Pyriform sinus residue: none present    Other residue: none present Best: (1) Material does not enter the airway      Worst: (1) Material does not enter the airway     None completed nor needed    Solid (regular/ IDDSI 7) - bite of cookie x2 Vallecular residue: none present    Pyriform sinus residue: none present    Other residue: none present Best: (1) Material does not enter the airway      Worst: (1) Material does not enter the airway     Liquid wash-liquid wash used to initiate swallow on 1st trial       Images:  No relevant images           EAT-10 Score:    Eating Assessment Tool (EAT-10) is a questionnaire given to the patient to  her swallowing function. This assessment is used to document the initial dysphagia severity and monitor the treatment response in persons with a wide array of swallowing disorders.    Key: 0= no problem; 4= severe problem  1. My swallowing problem has caused me to lose weight. - 0  2. My swallowing problem interferes with my ability to go out for meals. - 0  3. Swallowing liquids takes extra effort. - 2  4. Swallowing solids takes extra effort. - 2  5. Swallowing pills takes extra effort. - 0  6. Swallowing is painful. - 0  7. The pleasure of eating is affected by my swallowing. - 0  8. When I swallow food sticks in my throat. - 2  9. I cough when I eat. - 0  10. Swallowing is stressful. - 4    Ade ranked her swallowing function as a 10/40     Interpretation: Score of greater than 3 is indicative of a swallowing disorder (Pedro et al., 2008); higher scores correlate with increased  penetration-aspiration  scale scores, and scores greater than 15 results in the patient being 2.2 times more likely to aspirate (Estiven et al., 2015)    References:   FORD Vasquez., ISMAEL Weinberg., LILI Garner., DHRUV Marques., MARLENA Duarte., DHRUV Orellana, & SANDIE Villalta. (2008). Validity and reliability of the Eating Assessment Tool (EAT-10). Annals of Otology, Rhinology & Laryngology, 117(12), 919-924. [email or web address]  ISMAEL Jean-Baptiste., JEFF Read., DHRUV Salamanca., Catherine, MHarley LOPEZ., & FORD Vasquez (2015). The ability of the 10-item eating assessment tool (EAT-10) to predict aspiration risk in persons with dysphagia. Annals of Otology, Rhinology & Laryngology, 124(5), 351-354. [email or web address]     Reflux Severity Index:  The Reflux Severity Index (RSI) was completed to assess the possible presence and/or severity of laryngopharyngeal reflux symptoms and any relationship between this condition and the patient's dysphagia. A score of greater than or equal to 15 may indicate laryngopharyngeal reflux. Score 0-5 (0=no problem, 1=very mild, 2=moderate or slight, 3=moderate, 4=severe, & 5=problem as bad as it can be)  1. Hoarseness or a problem with your voice. 0  2. Clearing your throat. 3  3. Excess throat mucous or post-nasal drip. 2  4. Difficulty swallowing food, liquids or pills. 2  5. Coughing after you ate or after lying down. 5  6. Breathing difficulties or choking episodes. 3  7. Troublesome or annoying cough. 4  8. Sensations of something sticking in your throat. 0  9. Heartburn, chest pain, indigestion, or stomach acid coming up. 4  Patient completed with a result of  23/45    Recommend MBSS: no    Treatment   Treatment Time In: n/a  Treatment Time Out: n/a  Total Treatment Time: n/a  No treatment performed 2/2 time to administer evaluation    Education: Plan of Care, role of SLP in care, and aspiration precautions  were discussed with the patient. Patient  expressed understanding of all discussed.     Home Program: None  Assessment   Ade is a 77 y.o. female referred to outpatient Speech Therapy with a medical diagnosis of dysphagia. Results of this FEES revealted that the patient presents with within functional limits oropharyngeal swallowing as defined by the dysphagia outcome and severity scale (adapted for FEES by LINDY Marie, [facility], [facility] from O'Bassam et al 1999).     Oral phase findings Posterior containment Within normal limits    Mastication Within normal limits    Clearance Within normal limits   Pharyngeal phase findings Initiation of the swallow Timely    Base of tongue retraction Within normal limits    Epiglottic movement Within normal limits    Pharyngeal contraction Within normal limits    Laryngeal vestibule closure Within normal limits    PES opening Within normal limits    Other findings Multiple spontaneous swallows per bolus when needed as appropriate      WFL oral and pharyngeal phases of the swallow. Both swallow safety and swallow efficiency are preserved,. Patient appears to be at low risk for aspiration related pneumonia from a primary oropharyngeal dysphagia in consideration of three pillars of aspiration pneumonia (Cynthia, 2005) including oral health status, overall health/immune status, and laryngeal vestibule closure/severity of dysphagia.  However, unable to assess risk related to aspiration pneumonia cause by the aspiration of gastric content. Patient at low risk for malnutrition/dehydration. Behavioral swallow rehabilitation is not warranted at this time.     Consistency Recommendations: Thin liquids (IDDSI 0) and regular consistencies (IDDSI 7).  Medications should be taken Whole in thin liquids.     Demonstrates impairments including limitations as described in the problem list. Positive prognostic factors include patient motivation. Negative prognostic factors include complex medical history. No barriers to  therapy identified.  Skilled outpatient dysphagia therapy is not warranted at this time.     Rehab Potential: good  Pt's spiritual, cultural and educational needs considered and pt agreeable to plan of care and goals.      Plan   Plan of Care Certification: 1/24/2023 1/24/23; evaluation only    Recommended Treatment Plan: Skilled speech therapy intervention services are not warranted at this time.     Other Recommendations:   - Aggressive oral care at least twice daily (morning and bedtime) is strongly recommended to reduce bacteria on oropharyngeal surfaces which may increase the risk of nosocomial infections, including pneumonia.   - Monitor for any signs/symptoms of aspiration (such as wet/gurgly voice that does not clear with coughing, inability to make any voice sounds, any persistent coughing with oral intake, otherwise unexplained fever, unexplained increased or new difficulty or discomfort breathing, unexplained increase in sleepiness/lethargy/significant fatigue, unexplained increase or new onset confusion or change in cognitive functioning, or any other unexplained change in health or well-being that could be related to swallowing).  - Risk Management: use good oral hygiene , sit upright for all PO intake, increase physical mobility as tolerated, behavioral reflux precautions, alternate bites and sips, small bites and sips, multiple swallows per bolus, allow extra time for meals, remain upright for at least 2 hours following any PO intake, and eat small meals throughout the day to reduce discomfort associated with delayed emptying of the esophagus  -Specialist Referrals: GI  -Ancillary Tests: Consider N/a .  -Follow-up exam: Follow up swallow study is not indicated at this time.    Therapist's Name:   JOSE Palacios, L-SLP, CCC-SLP  Speech Language Pathologist       Date: 1/24/2023

## 2023-01-24 NOTE — PROGRESS NOTES
Please see results of Fiberoptic Endoscopic Evaluation of Swallowing in plan of care.    JOSE Palacios, CCC-SLP  Speech Language Pathologist   1/24/2023

## 2023-01-24 NOTE — PATIENT INSTRUCTIONS
Aspiration Precautions:    Tips for safe eating and drinking:   Clean your mouth before and after meals.  Make sure your mouth is clean and moist before starting your meal. When you are finished, clean your mouth again. Make sure there is no food around the teeth or stuck in your cheeks.   Be alert. Eat, drink, and take your medications only when you are alert and paying attention.   Reduce distractions. Turn off the TV and reduce distractions while you are eating and drinking.  Sit upright. Sit fully upright for your meals and to take your medications. It's best to eat and drink while sitting in a chair, unless your healthcare provider gave you other positioning instructions.   Stay upright. Stay fully upright for at least 30 minutes after a meal. You may also need to avoid eating 1-2 hours before bed.   Stay active.  Exercise as directed by your healthcare provider. Staying active helps your lungs stay clear.  Keep your lungs clear. Ask your doctor to recommend other therapy or devices to help you with the strength of your cough and the overall clearance of your lungs.     Copyright [employer] 2017

## 2023-01-27 ENCOUNTER — OFFICE VISIT (OUTPATIENT)
Dept: OTOLARYNGOLOGY | Facility: CLINIC | Age: 78
End: 2023-01-27
Payer: MEDICARE

## 2023-01-27 VITALS — WEIGHT: 159 LBS | HEIGHT: 63 IN | BODY MASS INDEX: 28.17 KG/M2

## 2023-01-27 DIAGNOSIS — R13.14 PHARYNGOESOPHAGEAL DYSPHAGIA: Primary | ICD-10-CM

## 2023-01-27 DIAGNOSIS — M95.0 NASAL VALVE COLLAPSE: ICD-10-CM

## 2023-01-27 DIAGNOSIS — R09.89 CHRONIC THROAT CLEARING: ICD-10-CM

## 2023-01-27 PROCEDURE — 1101F PT FALLS ASSESS-DOCD LE1/YR: CPT | Mod: CPTII,S$GLB,, | Performed by: OTOLARYNGOLOGY

## 2023-01-27 PROCEDURE — 1159F PR MEDICATION LIST DOCUMENTED IN MEDICAL RECORD: ICD-10-PCS | Mod: CPTII,S$GLB,, | Performed by: OTOLARYNGOLOGY

## 2023-01-27 PROCEDURE — 1159F MED LIST DOCD IN RCRD: CPT | Mod: CPTII,S$GLB,, | Performed by: OTOLARYNGOLOGY

## 2023-01-27 PROCEDURE — 1126F PR PAIN SEVERITY QUANTIFIED, NO PAIN PRESENT: ICD-10-PCS | Mod: CPTII,S$GLB,, | Performed by: OTOLARYNGOLOGY

## 2023-01-27 PROCEDURE — 1101F PR PT FALLS ASSESS DOC 0-1 FALLS W/OUT INJ PAST YR: ICD-10-PCS | Mod: CPTII,S$GLB,, | Performed by: OTOLARYNGOLOGY

## 2023-01-27 PROCEDURE — 1126F AMNT PAIN NOTED NONE PRSNT: CPT | Mod: CPTII,S$GLB,, | Performed by: OTOLARYNGOLOGY

## 2023-01-27 PROCEDURE — 1157F PR ADVANCE CARE PLAN OR EQUIV PRESENT IN MEDICAL RECORD: ICD-10-PCS | Mod: CPTII,S$GLB,, | Performed by: OTOLARYNGOLOGY

## 2023-01-27 PROCEDURE — 99499 UNLISTED E&M SERVICE: CPT | Mod: S$GLB,,, | Performed by: OTOLARYNGOLOGY

## 2023-01-27 PROCEDURE — 99213 OFFICE O/P EST LOW 20 MIN: CPT | Mod: S$GLB,,, | Performed by: OTOLARYNGOLOGY

## 2023-01-27 PROCEDURE — 99499 RISK ADDL DX/OHS AUDIT: ICD-10-PCS | Mod: S$GLB,,, | Performed by: OTOLARYNGOLOGY

## 2023-01-27 PROCEDURE — 3288F PR FALLS RISK ASSESSMENT DOCUMENTED: ICD-10-PCS | Mod: CPTII,S$GLB,, | Performed by: OTOLARYNGOLOGY

## 2023-01-27 PROCEDURE — 3288F FALL RISK ASSESSMENT DOCD: CPT | Mod: CPTII,S$GLB,, | Performed by: OTOLARYNGOLOGY

## 2023-01-27 PROCEDURE — 99213 PR OFFICE/OUTPT VISIT, EST, LEVL III, 20-29 MIN: ICD-10-PCS | Mod: S$GLB,,, | Performed by: OTOLARYNGOLOGY

## 2023-01-27 PROCEDURE — 1157F ADVNC CARE PLAN IN RCRD: CPT | Mod: CPTII,S$GLB,, | Performed by: OTOLARYNGOLOGY

## 2023-01-27 NOTE — PROGRESS NOTES
OTOLARYNGOLOGY CLINIC NOTE  Date:  01/27/2023     Chief complaint:  Chief Complaint   Patient presents with    Follow-up     4 month follow up for dysphagia, doing better       History of Present Illness  Ade Castellano is a 77 y.o. female  presenting today for a followup.  Swallowing has been much better per patient report  Breathing through nose is good; she has had significant  improvement with using saline and flonase    Saw gi and had testing done,Gi did a dilation in November and  last seen January 2023 and notes reviewed-possible achalasia or pseudoachalasia however pt unable to go to [facility] to get manometry  Also seen by SLP for eval for OP dysphagia and following recs     I originally saw the patient on 9-30 - 22. Below text is copied from initial note on that date describing history of present illness at time of presentation.    Dysphagia. Happens with food and liquids.  Has had esophageal dilation in the past ( 2017 and jan 2021). Has had history of lung cancer.  Saw dr. Mckee in august and had bilateral effusions , recommended nasal steroid . Feels better   Nose runs a lot. She also had issue with sneezing. Does flonase once a day which helps and she also takes singulair.     Had egd in jan of 2021. Symptoms started to recur earlier this year.   Had a spot on the left lung and was told had pneumonia potentially. Weight stable after had weight loss with apetitte loss but it is better.  Drinks water and comes back up   Has history of bells palsy  If cant rub the throat to get it to go down the whole bolus of food comes up      Has tried chewing more , does not always help. Can happen with any food.   On occsdion gets hoarse but not bad      + throat clearing     Past Medical History  Past Medical History:   Diagnosis Date    Abnormal stress test 8/5/2020    Acute respiratory failure with hypoxia and hypercapnia 11/29/2017    Angina pectoris     Arthritis     Bell's palsy     left facial  weakness    Breast cancer     RIGHT    CAD (coronary artery disease)     Cervical cancer     Chronic bronchitis     Chronic heart failure with preserved ejection fraction 8/5/2020    Chronic heart failure with preserved ejection fraction 8/5/2020    COPD (chronic obstructive pulmonary disease)     Dr. Katz    Dental bridge present     Emphysema of lung     H/O colonoscopy 06/2017    due for repeat colonsocopy in 6/2018    History of Bell's palsy     History of heart artery stent     Dr. Ortiz  x2 stents    Hyperlipidemia     Hypertension     Lung cancer     Myocardial infarction     SUNITHA (obstructive sleep apnea)     intolerant to mask    SUNITHA (obstructive sleep apnea)     intolerant to mask     Pneumonia     Pneumonia due to other staphylococcus     PUD (peptic ulcer disease)     Severe anemia 6/11/2018    Sleep apnea     Vaginal delivery     x1        Past Surgical History  Past Surgical History:   Procedure Laterality Date    ADENOIDECTOMY      BREAST BIOPSY Right     x3    BREAST LUMPECTOMY      BREAST SURGERY      lumpectomy right side     CERVIX SURGERY      cone    COLONOSCOPY N/A 3/17/2017    Procedure: COLONOSCOPY;  Surgeon: Julio Rudd MD;  Location: [facility] ENDO;  Service: Endoscopy;  Laterality: N/A;    COLONOSCOPY N/A 6/30/2017    Procedure: COLONOSCOPY;  Surgeon: Julio Rudd MD;  Location: [facility] ENDO;  Service: Endoscopy;  Laterality: N/A;    COLONOSCOPY N/A 11/28/2018    Procedure: COLONOSCOPY;  Surgeon: Nura Urbina MD;  Location: [facility] ENDO;  Service: Endoscopy;  Laterality: N/A;    COLONOSCOPY N/A 1/29/2019    Procedure: COLONOSCOPY;  Surgeon: Emmanuel Perez MD;  Location: [facility] ENDO;  Service: Endoscopy;  Laterality: N/A;  confirmed appt-SP    COLONOSCOPY N/A 7/26/2022    Procedure: COLONOSCOPY;  Surgeon: James Healy MD;  Location: [facility] ENDO;  Service: Endoscopy;  Laterality: N/A;  fully vaccinated -sm  mediport in chest -     CORONARY ANGIOPLASTY WITH STENT PLACEMENT       ESOPHAGOGASTRODUODENOSCOPY N/A 1/25/2021    Procedure: EGD (ESOPHAGOGASTRODUODENOSCOPY);  Surgeon: Dmitry Gonzalez MD;  Location: [facility] ENDO;  Service: Endoscopy;  Laterality: N/A;  rapid test >50 miles -ml    ESOPHAGOGASTRODUODENOSCOPY N/A 11/8/2022    Procedure: EGD (ESOPHAGOGASTRODUODENOSCOPY);  Surgeon: Tapan Stevenson MD;  Location: [facility] ENDO;  Service: Endoscopy;  Laterality: N/A;  w/dilation  fully vaccinated, instructions mailed-[facility]  11/4 pt called did not receive instructions, does not have portal or email, went over prep instructions and medication instructions with pt on the phone -LW    EYE SURGERY Bilateral 06/08/2018    cataract     LEFT HEART CATHETERIZATION Left 8/13/2020    Procedure: Left heart cath, rra, noon;  Surgeon: Guevara Skelton MD;  Location: [facility] CATH LAB;  Service: Cardiology;  Laterality: Left;  RN PREOP 8/7/2020---COVID NEGATIVE ON 8/12    PORTACATH PLACEMENT Right 01/2018    sweat glands axillary regions Bilateral     TONSILLECTOMY          Medications  Current Outpatient Medications on File Prior to Visit   Medication Sig Dispense Refill    acetaminophen (TYLENOL) 650 MG TbSR Take 650 mg by mouth every 8 (eight) hours.      albuterol (PROVENTIL) 2.5 mg /3 mL (0.083 %) nebulizer solution NEBULIZE 1 vial EVERY 6 HOURS AS NEEDED FOR WHEEZING 540 mL 1    albuterol (VENTOLIN HFA) 90 mcg/actuation inhaler INHALE 2 PUFF(S) BY MOUTH EVERY 4 - 6 HOURS AS NEEDED FOR SHORTNESS OF BREATH or WHEEZING 18 g 5    aspirin (ECOTRIN) 81 MG EC tablet Take 81 mg by mouth once daily.       carboxymethylcellulose (REFRESH PLUS) 0.5 % Dpet 1 drop 3 (three) times daily as needed.      fluticasone propionate (FLONASE) 50 mcg/actuation nasal spray USE TWO SPRAYS IN EACH NOSTRIL ONCE A DAY 16 g 5    isosorbide mononitrate (IMDUR) 30 MG 24 hr tablet Take 1 tablet (30 mg total) by mouth once daily. Hold if SBP <120 30 tablet 11    levocetirizine (XYZAL) 5 MG tablet Take 1 tablet (5 mg total) by mouth every evening. 30  tablet 11    meclizine (ANTIVERT) 25 mg tablet take 1 TABLET(S) BY MOUTH TWICE DAILY AS NEEDED for dizziness 30 tablet 2    metoprolol tartrate (LOPRESSOR) 25 MG tablet TAKE 1 TABLET BY MOUTH TWICE A DAY FOR BLOOD PRESSURE 180 tablet 2    montelukast (SINGULAIR) 10 mg tablet TAKE 1 TABLET BY MOUTH EVERY EVENING 30 tablet 0    nitroGLYCERIN (NITROSTAT) 0.4 MG SL tablet PLACE 1 TAB UNDER TONGUE EVERY 5 MINUTES x 3 DOSES AS NEEDED FOR CHEST PAIN. IF NOT RESOLVED CALL 911 90 tablet 0    pantoprazole (PROTONIX) 40 MG tablet Take 1 tablet (40 mg total) by mouth once daily. 30 tablet 11    rosuvastatin (CRESTOR) 10 MG tablet Take 1 tablet (10 mg total) by mouth once daily. 30 tablet 11    sodium chloride (OCEAN) 0.65 % nasal spray 1 spray by Nasal route as needed for Congestion.      TRELEGY ELLIPTA 100-62.5-25 mcg DsDv INHALE ONE PUFF INTO THE LUNGS ONCE A DAY  5     No current facility-administered medications on file prior to visit.       Review of Systems  Review of Systems   Constitutional: Negative.    Eyes:  Positive for photophobia.   Respiratory:  Positive for cough, shortness of breath and wheezing.    Gastrointestinal: Negative.    Genitourinary: Negative.    Musculoskeletal:  Positive for back pain and neck pain.   Skin: Negative.    Neurological:  Positive for dizziness.   Psychiatric/Behavioral: Negative.      Answers submitted by the patient for this visit:  Review of Symptoms Questionnaire  (Submitted on 1/27/2023)  trouble swallowing: Yes  Snoring?: Yes  Sleep Apnea?: Yes  Irregular heartbeat?: Yes  Muscle aches / pain?: Yes  Social History   reports that she quit smoking about 5 years ago. Her smoking use included cigarettes. She has a 12.50 pack-year smoking history. She has never used smokeless tobacco. She reports that she does not drink alcohol and does not use drugs.     Family History  Family History   Problem Relation Age of Onset    Cancer Mother         breast    Heart disease Mother     Breast  "cancer Mother     Cancer Father         lung-smoker     Cancer Sister         lung-smoker     Heart attack Sister     Cancer Maternal Grandmother     Heart disease Maternal Grandmother     Cancer Maternal Grandfather     Heart disease Maternal Grandfather     Cancer Paternal Grandmother     Cancer Paternal Grandfather     Cancer Sister         mets not sure where it started     COPD Sister     Heart disease Sister     Kidney cancer Son     Colon cancer Neg Hx     Esophageal cancer Neg Hx         Physical Exam   There were no vitals filed for this visit. Body mass index is 28.17 kg/m².  Weight: 72.1 kg (159 lb)   Height: 5' 3" (160 cm)     GENERAL: no acute distress.  HEAD: normocephalic.   EYES: No scleral icterus  EARS: external ear without lesion, normal pinna shape and position.    NOSE: external nose without significant bony abnormality Slight nasal valve collapse on left  ORAL CAVITY/OROPHARYNX: tongue mobile.   NECK: trachea midline.   RESPIRATORY: no stridor, no stertor.  Respirations nonlabored.  NEURO: alert, responds to questions appropriately.    PSYCH:mood appropriate      Imaging:  The patient does have any new imaging of the head and neck since last visit. Pet/ct negative for mass/hypermetabolic disease in head and neck area    Labs:  CBC  Recent Labs   Lab 09/26/22  0736 11/16/22  0703 12/20/22  0714   WBC 4.53 6.05 4.13   Hemoglobin 13.3 12.4 13.7   Hematocrit 40.5 39.4 42.5   MCV 91 91 91   Platelets 219 295 208     BMP  Recent Labs   Lab 04/06/22  0941 04/06/22  1551 04/07/22  0529 04/11/22  0855 08/22/22  0748 09/26/22  0736 11/16/22  0703 12/20/22  0714   Glucose 130 H   < > 130 H   < > 120 H 123 H 134 H 117 H   Sodium 138   < > 138   < > 140 140 141 137   Potassium 2.8 L   < > 3.4 L   < > 4.1 3.4 L 3.3 L 3.6   Chloride 96   < > 105   < > 107 108 106 106   CO2 29   < > 24   < > 21 L 22 L 22 L 23   BUN 14   < > 13   < > 19 18 16 23   Creatinine 1.0   < > 0.8   < > 0.9 1.0 0.9 0.9   Calcium 8.9   < " > 8.5 L   < > 9.4 9.3 9.5 9.5   Phosphorus 3.1  --  2.9  --   --   --   --   --    Magnesium 1.8   < >  --    < > 1.6 1.6 1.6  --     < > = values in this interval not displayed.     COAGS  Recent Labs   Lab 09/18/20  0925   INR 1.0       Assessment  1. Pharyngoesophageal dysphagia    2. Nasal valve collapse    3. Chronic throat clearing       Plan:  Discussed plan of care with patient in detail and all questions answered. Patient reported understanding of plan of care.   Continue nasal sprays as needed for congestion  Throat clearing has resolved  Discussed with patient about achalasia and should follow up with gi in 6 months for check in . I will also put something down for her to see me in 6 months to ensure no recurrence of issues. If doing ok at that time can do prn. Advised to contact me as well should anything come up prior  I spent a total of 20 minutes on the day of the visit.  This includes face to face time and non-face to face time preparing to see the patient (eg, review of tests), obtaining and/or reviewing separately obtained history, documenting clinical information in the electronic or other health record, independently interpreting results and communicating results to the patient/family/caregiver, or care coordinator.   Please be aware that this note has been generated with the assistance of Olivia voice-to-text.  Please excuse any spelling or grammatical errors.

## 2023-01-31 ENCOUNTER — INFUSION (OUTPATIENT)
Dept: INFUSION THERAPY | Facility: HOSPITAL | Age: 78
End: 2023-01-31
Attending: INTERNAL MEDICINE
Payer: MEDICARE

## 2023-01-31 ENCOUNTER — OFFICE VISIT (OUTPATIENT)
Dept: HEMATOLOGY/ONCOLOGY | Facility: CLINIC | Age: 78
End: 2023-01-31
Payer: MEDICARE

## 2023-01-31 ENCOUNTER — LAB VISIT (OUTPATIENT)
Dept: LAB | Facility: HOSPITAL | Age: 78
End: 2023-01-31
Attending: INTERNAL MEDICINE
Payer: MEDICARE

## 2023-01-31 VITALS
WEIGHT: 161.63 LBS | OXYGEN SATURATION: 95 % | BODY MASS INDEX: 28.64 KG/M2 | HEART RATE: 53 BPM | SYSTOLIC BLOOD PRESSURE: 128 MMHG | DIASTOLIC BLOOD PRESSURE: 67 MMHG | HEIGHT: 63 IN

## 2023-01-31 DIAGNOSIS — C50.919 METASTATIC BREAST CANCER: ICD-10-CM

## 2023-01-31 DIAGNOSIS — C50.111 MALIGNANT NEOPLASM OF CENTRAL PORTION OF RIGHT BREAST IN FEMALE, ESTROGEN RECEPTOR NEGATIVE: ICD-10-CM

## 2023-01-31 DIAGNOSIS — Z17.1 MALIGNANT NEOPLASM OF CENTRAL PORTION OF RIGHT BREAST IN FEMALE, ESTROGEN RECEPTOR NEGATIVE: ICD-10-CM

## 2023-01-31 DIAGNOSIS — J44.9 CHRONIC OBSTRUCTIVE PULMONARY DISEASE, UNSPECIFIED COPD TYPE: ICD-10-CM

## 2023-01-31 DIAGNOSIS — C50.919 RECURRENT BREAST CANCER, UNSPECIFIED LATERALITY: Primary | ICD-10-CM

## 2023-01-31 DIAGNOSIS — Z85.118 HISTORY OF LUNG CANCER: ICD-10-CM

## 2023-01-31 DIAGNOSIS — E03.2 HYPOTHYROIDISM DUE TO DRUGS: ICD-10-CM

## 2023-01-31 DIAGNOSIS — C34.91 SQUAMOUS CELL CARCINOMA OF RIGHT LUNG: ICD-10-CM

## 2023-01-31 DIAGNOSIS — C34.90 SQUAMOUS CELL CARCINOMA LUNG: Primary | ICD-10-CM

## 2023-01-31 DIAGNOSIS — Z87.01 HISTORY OF PNEUMONIA: ICD-10-CM

## 2023-01-31 LAB
ALBUMIN SERPL BCP-MCNC: 3.8 G/DL (ref 3.5–5.2)
ALP SERPL-CCNC: 62 U/L (ref 55–135)
ALT SERPL W/O P-5'-P-CCNC: 19 U/L (ref 10–44)
ANION GAP SERPL CALC-SCNC: 7 MMOL/L (ref 8–16)
AST SERPL-CCNC: 17 U/L (ref 10–40)
BILIRUB SERPL-MCNC: 0.5 MG/DL (ref 0.1–1)
BUN SERPL-MCNC: 15 MG/DL (ref 8–23)
CALCIUM SERPL-MCNC: 9 MG/DL (ref 8.7–10.5)
CHLORIDE SERPL-SCNC: 112 MMOL/L (ref 95–110)
CO2 SERPL-SCNC: 22 MMOL/L (ref 23–29)
CREAT SERPL-MCNC: 0.8 MG/DL (ref 0.5–1.4)
ERYTHROCYTE [DISTWIDTH] IN BLOOD BY AUTOMATED COUNT: 14.5 % (ref 11.5–14.5)
EST. GFR  (NO RACE VARIABLE): >60 ML/MIN/1.73 M^2
GLUCOSE SERPL-MCNC: 104 MG/DL (ref 70–110)
HCT VFR BLD AUTO: 41.9 % (ref 37–48.5)
HGB BLD-MCNC: 13.7 G/DL (ref 12–16)
IMM GRANULOCYTES # BLD AUTO: 0.03 K/UL (ref 0–0.04)
MAGNESIUM SERPL-MCNC: 1.7 MG/DL (ref 1.6–2.6)
MCH RBC QN AUTO: 29.9 PG (ref 27–31)
MCHC RBC AUTO-ENTMCNC: 32.7 G/DL (ref 32–36)
MCV RBC AUTO: 92 FL (ref 82–98)
NEUTROPHILS # BLD AUTO: 2.9 K/UL (ref 1.8–7.7)
PLATELET # BLD AUTO: 191 K/UL (ref 150–450)
PMV BLD AUTO: 9.7 FL (ref 9.2–12.9)
POTASSIUM SERPL-SCNC: 3.6 MMOL/L (ref 3.5–5.1)
PROT SERPL-MCNC: 6.8 G/DL (ref 6–8.4)
RBC # BLD AUTO: 4.58 M/UL (ref 4–5.4)
SODIUM SERPL-SCNC: 141 MMOL/L (ref 136–145)
TSH SERPL DL<=0.005 MIU/L-ACNC: 2.79 UIU/ML (ref 0.4–4)
WBC # BLD AUTO: 4.96 K/UL (ref 3.9–12.7)

## 2023-01-31 PROCEDURE — 83735 ASSAY OF MAGNESIUM: CPT | Mod: HCNC | Performed by: INTERNAL MEDICINE

## 2023-01-31 PROCEDURE — 25000003 PHARM REV CODE 250: Mod: HCNC | Performed by: INTERNAL MEDICINE

## 2023-01-31 PROCEDURE — 36415 COLL VENOUS BLD VENIPUNCTURE: CPT | Mod: HCNC | Performed by: INTERNAL MEDICINE

## 2023-01-31 PROCEDURE — 99214 OFFICE O/P EST MOD 30 MIN: CPT | Mod: HCNC,S$GLB,, | Performed by: INTERNAL MEDICINE

## 2023-01-31 PROCEDURE — 63600175 PHARM REV CODE 636 W HCPCS: Mod: HCNC | Performed by: INTERNAL MEDICINE

## 2023-01-31 PROCEDURE — 84443 ASSAY THYROID STIM HORMONE: CPT | Mod: HCNC | Performed by: INTERNAL MEDICINE

## 2023-01-31 PROCEDURE — 1125F AMNT PAIN NOTED PAIN PRSNT: CPT | Mod: HCNC,CPTII,S$GLB, | Performed by: INTERNAL MEDICINE

## 2023-01-31 PROCEDURE — 3078F DIAST BP <80 MM HG: CPT | Mod: HCNC,CPTII,S$GLB, | Performed by: INTERNAL MEDICINE

## 2023-01-31 PROCEDURE — 1101F PT FALLS ASSESS-DOCD LE1/YR: CPT | Mod: HCNC,CPTII,S$GLB, | Performed by: INTERNAL MEDICINE

## 2023-01-31 PROCEDURE — 99999 PR PBB SHADOW E&M-EST. PATIENT-LVL III: CPT | Mod: PBBFAC,HCNC,, | Performed by: INTERNAL MEDICINE

## 2023-01-31 PROCEDURE — 85027 COMPLETE CBC AUTOMATED: CPT | Mod: HCNC | Performed by: INTERNAL MEDICINE

## 2023-01-31 PROCEDURE — 1125F PR PAIN SEVERITY QUANTIFIED, PAIN PRESENT: ICD-10-PCS | Mod: HCNC,CPTII,S$GLB, | Performed by: INTERNAL MEDICINE

## 2023-01-31 PROCEDURE — 96523 IRRIG DRUG DELIVERY DEVICE: CPT | Mod: HCNC

## 2023-01-31 PROCEDURE — 3074F PR MOST RECENT SYSTOLIC BLOOD PRESSURE < 130 MM HG: ICD-10-PCS | Mod: HCNC,CPTII,S$GLB, | Performed by: INTERNAL MEDICINE

## 2023-01-31 PROCEDURE — 1157F ADVNC CARE PLAN IN RCRD: CPT | Mod: HCNC,CPTII,S$GLB, | Performed by: INTERNAL MEDICINE

## 2023-01-31 PROCEDURE — 3078F PR MOST RECENT DIASTOLIC BLOOD PRESSURE < 80 MM HG: ICD-10-PCS | Mod: HCNC,CPTII,S$GLB, | Performed by: INTERNAL MEDICINE

## 2023-01-31 PROCEDURE — 99999 PR PBB SHADOW E&M-EST. PATIENT-LVL III: ICD-10-PCS | Mod: PBBFAC,HCNC,, | Performed by: INTERNAL MEDICINE

## 2023-01-31 PROCEDURE — 80053 COMPREHEN METABOLIC PANEL: CPT | Mod: HCNC | Performed by: INTERNAL MEDICINE

## 2023-01-31 PROCEDURE — 3074F SYST BP LT 130 MM HG: CPT | Mod: HCNC,CPTII,S$GLB, | Performed by: INTERNAL MEDICINE

## 2023-01-31 PROCEDURE — 3288F FALL RISK ASSESSMENT DOCD: CPT | Mod: HCNC,CPTII,S$GLB, | Performed by: INTERNAL MEDICINE

## 2023-01-31 PROCEDURE — 1101F PR PT FALLS ASSESS DOC 0-1 FALLS W/OUT INJ PAST YR: ICD-10-PCS | Mod: HCNC,CPTII,S$GLB, | Performed by: INTERNAL MEDICINE

## 2023-01-31 PROCEDURE — 1157F PR ADVANCE CARE PLAN OR EQUIV PRESENT IN MEDICAL RECORD: ICD-10-PCS | Mod: HCNC,CPTII,S$GLB, | Performed by: INTERNAL MEDICINE

## 2023-01-31 PROCEDURE — 99214 PR OFFICE/OUTPT VISIT, EST, LEVL IV, 30-39 MIN: ICD-10-PCS | Mod: HCNC,S$GLB,, | Performed by: INTERNAL MEDICINE

## 2023-01-31 PROCEDURE — A4216 STERILE WATER/SALINE, 10 ML: HCPCS | Mod: HCNC | Performed by: INTERNAL MEDICINE

## 2023-01-31 PROCEDURE — 3288F PR FALLS RISK ASSESSMENT DOCUMENTED: ICD-10-PCS | Mod: HCNC,CPTII,S$GLB, | Performed by: INTERNAL MEDICINE

## 2023-01-31 RX ORDER — HEPARIN 100 UNIT/ML
500 SYRINGE INTRAVENOUS
Status: DISCONTINUED | OUTPATIENT
Start: 2023-01-31 | End: 2023-01-31 | Stop reason: HOSPADM

## 2023-01-31 RX ORDER — SODIUM CHLORIDE 0.9 % (FLUSH) 0.9 %
10 SYRINGE (ML) INJECTION
Status: CANCELLED | OUTPATIENT
Start: 2023-01-31

## 2023-01-31 RX ORDER — HEPARIN 100 UNIT/ML
500 SYRINGE INTRAVENOUS
Status: CANCELLED | OUTPATIENT
Start: 2023-01-31

## 2023-01-31 RX ORDER — SODIUM CHLORIDE 0.9 % (FLUSH) 0.9 %
10 SYRINGE (ML) INJECTION
Status: DISCONTINUED | OUTPATIENT
Start: 2023-01-31 | End: 2023-01-31 | Stop reason: HOSPADM

## 2023-01-31 RX ADMIN — Medication 10 ML: at 08:01

## 2023-01-31 RX ADMIN — HEPARIN 500 UNITS: 100 SYRINGE at 08:01

## 2023-01-31 NOTE — LETTER
February 18, 2023        Swetha Katz MD  [address] [place] [address] [address]vd  [address]  [place] [id number]             [facility] - Hematology Oncology  [address] [facility] [address]  [place] [address]  Phone: [phone]  Fax: [phone]   Patient: Ade Castellano   MR Number: [id number]   YOB: 1945   Date of Visit: 1/31/2023       Dear Dr. Katz:    Thank you for referring Ade Castellano to me for evaluation. Below are the relevant portions of my assessment and plan of care.    1. Recurrent breast cancer, unspecified laterality    2. History of lung cancer    3. History of pneumonia    4. Chronic obstructive pulmonary disease, unspecified COPD type                 If you have questions, please do not hesitate to call me. I look forward to following Ade along with you.    Sincerely,      Mildred Holliday MD           CC    No Recipients

## 2023-01-31 NOTE — PROGRESS NOTES
Subjective:       Patient ID: Ade Castellano is a 77 y.o. female.    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 The PET(+) disease in the right axilla and right supraclavicular fossa will be boosted at 200 cGy per fraction to 6000 cGy total dose.  S/p LYMPH NODE, RIGHT AXILLA, BIOPSY: 6/16/21 . Metastatic poorly-differentiated carcinoma, c/w breast primary ERnegPRneg Her2 neg  PD-L1 (22C3) IHC CPS> is greater than or equal to 10  Pembrolizumab 7/22/21 -present        Prior Hx:  76 y/o female with history of  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  And Rt  breast Haja/p  cycle 6 of carboplatin/Abraxane 7/2/2018   She has  History of Stage 1A  Rt breast cancer Grade 3, ER/[place] neg , Her 2 jose maria neg s/p lumpectomy 7/11/2014 and s/p adjuvant RT 9/2014 Pt declined Adjuvant chemo.Pathology showed a 1.15 cm, grade 3 infiltrating ductal carcinoma.  One sentinel lymph node was negative for malignancy.  Margins were negative and the closest margin was 1 cm.  She was staged  pT1c  pN0 cM0 stage IA.  She declined adjuvant chemo therapy.  She completed post operative radiation therapy at 400 cGy per fraction to 4000 cGy 9/2014  Pt hospitalized 11/2017 for  acute on chronic respiratory failure requiring intubation and ventilation. Has large lung mass in right lung with probable post obstructive pneumonia resulting in COPD exacerbation.CTA  chest 11/29/2017 revealed   Large right hilar mass with involvement of adjacent segmental pulmonary arterial branches and bronchi with associated postobstructive atelectasis and volume loss in the RML  with adjacent ground glass opacity concerning for underlying neoplastic process. Mediastinal and axillary adenopathy, with a right axillary lymph node measuring up to 2.0 cm in short axis diameter.She underwent rt axillary LN bx at outside facility- on 1/16/2018 at [facility]. Pathology revealed metastatic poorly differentiated carcinoma of unknown primary site. She next underwent lung bx at [facility] 1/31/2018  benign lung tissue. Repeat Right Lung Bx 2/14/2018 Pathology reveals Squamous cell carcinoma PD-L1 10% low expression EGFR NEG ALK NEG BRAF NEG. Outside slides axillary LN specimen were reviewed/comparison to lung bx findings . She completed    cycle 6 of carboplatin/Abraxane completed 7/2018 .PET/CT  4/19/2018 revealed there has been a excellent response to therapy.  At least 90% reduction in malignant activity.PET/CT 2/21/2019 increased size and irregularity of the right axillary lymph node with increased FDG avidityMAMMO/US rt breast 2/2019 revealed suspicious findings rt axilla LN.  Right axilla, mass, core biopsy: Positive for poorly differentiated carcinoma breast primary ER neg/[place] neg Her2 neg.[facility] for outside reviewIt was determined  the lung tumor corresponds to a squamous cellcarcinoma and appears to be unrelated to the invasive ductal carcinoma of the breast. The axillary mass, in myopinion, corresponds to metastases of the breast primary. Although it is a poorly differentiated carcinoma, theimmunophenotype is most consistent with the one that the breast primary exhibited, and is inconsistent with metastatic squamous cell carcinoma. She was treated with  AC ( adriamycin 60m/m2/Cytoxan 600mg/m2)  q21d x 4 cycles completed 9/6/2019 . PET/CT 9/19/2019 shows disease response l decrease in  size and uptake of several right axillary lymph nodes and a supraclavicular lymph node.  Mild residual activity may indicate viable tumor.Pt followed by Rad/Onc. She was presented at [facility] tumor board and it was determined to proceed Radiation therapy only. No ALND due to concurrent lung and supraclavicular node. She  completed  RT 12/16/2019  To right axilla and right supraclavicular region PET/CT imaging  5/20/21 shows Continued increase in both size and hypermetabolic activity of right axillary level 1 lymph nodes a new, mildly hypermetabolic cutaneous thickening of the right breast. she underwent LYMPH NODE, RIGHT AXILLA, BIOPSY: 6/16/21 . Pathology showed Metastatic poorly-differentiated carcinoma, consistent with breast primary-ERneg/ PRneg  HER2  neg  Pt started on pembrolizumab 7/2021  Pt hospitalized 4/6/22 with hypokalemia and orthostatic hypotension        Interval Hx:Continues with mild fatigue  S/p C16 pembrolizumab 6/15/22  ( 400mg q6wks)  PET /CT  11/21/22 shows New right lower lobe infiltrate worrisome for pneumonia or aspiration.Chronic infiltrate right middle lobe.   Resolution of the left lower lobe infiltrate  Pt  treated by pulmonologist for pneumonia   Cough improved  She continues with mild LOGAN  No fevers  Mild fatigue  Appetite and weight stable  She has supp 02 at home     PET /CT 1/11/23 there is stable right breast skin thickening with mild radiotracer uptake.Interval resolution of hypermetabolic opacification in the left lower lobe as compared to FDG PET-CT 07/14/2022.  Interval improvement in patchy opacities in the right lower lobe as compared to CT 11/21/2022.         She is followed by Dr. Katz, pulmonology    She also has been followed by GI for dysphagia  In past  She has undergone dilation      Last Mammo 6/16/21  No mammographic evidence of malignancy.BI-RADS Category 1: Negative      PET/CT 1/26/22 No definite evidence of hypermetabolic tumor.  Interval resolution of  "hypermetabolic right axillary lymph nodes.  No new hypermetabolic findings.        PrevHx:She had an abnormal mammogram 6/4/2014 whichRevealed a round solid mass 6mm in rt breast.She then underwent U/S guided core bx of rt breast mass on 6/17/2014.Pathology revealed infiltrating ductal carcinoma Grade 3 with tumorPresent in thin-walled spaces suggestive of lymphatic spaces. HormoneReceptor status on tumor specimen revealed ER negative 0% ,MA negative 0% and Her 2 jose maria negative.She subsequently underwent rt segmental mastectomy and SLN bxOn 7/11/2014. Pathology of rt breast lumpectomy revealed invasiveDuctal carcinoma with micropapillary pattern ( Invasive micropapillaryCa) with max tumor dimension 11.5mm with suggestion of tumor in Thin walled spaces c/w lymphovascular involvement. SurgicalMargins free of tumor, grade 3, ER/MA neg , Her 2 jose maria neg With [place] Lymph node negative for Neoplasm. Pathologic staging [id number](i-)Her mother was diagnosed with breast cancer in her 50's/        Review of Systems   Constitutional: Positive for mild  fatigue. No  activity change,  fever.   HENT: Negative for mouth sores, rhinorrhea and trouble swallowing.    Eyes: Negative for visual disturbance.   Respiratory: Positive for  cough ( improved)Positive for LOGAN Negative for wheezing.    Cardiovascular: Negative for chest pain.   Gastrointestinal: Negative for abdominal pain and diarrhea.   Genitourinary: Negative for frequency.   Musculoskeletal: Negative for back pain.   Skin: Negative for rash.   Neurological: Negative for dizziness and headaches.   Hematological: Negative for adenopathy.   Psychiatric/Behavioral: The patient is not nervous/anxious.        Objective:        Vitals:    01/31/23 0806   BP: 128/67   BP Location: Left arm   Patient Position: Sitting   BP Method: Large (Automatic)   Pulse: (!) 53   SpO2: 95%   Weight: 73.3 kg (161 lb 9.6 oz)   Height: 5' 3" (1.6 m)       Vitals:    01/31/23 0806   BP: 128/67   BP " "Location: Left arm   Patient Position: Sitting   BP Method: Large (Automatic)   Pulse: (!) 53   SpO2: 95%   Weight: 73.3 kg (161 lb 9.6 oz)   Height: 5' 3" (1.6 m)           Physical Exam   Constitutional: She is oriented to person, place, and time. She appears ill, coughing episodes  HENT:   Head: Normocephalic.   Eyes: Conjunctivae and lids are normal.No scleral icterus.   Neck: Normal range of motion. Neck supple. No thyromegaly present.   Cardiovascular: Normal rate, regular rhythm and normal heart sounds.    No murmur heard.  Pulmonary/Chest: Breath sounds normal. She has no wheezes. She has no rales.   Abdominal: Soft. Bowel sounds are normal.  There is no tenderness. There is no rebound and no guarding.   Left breast- no masses or axillary LAD noted  Right breast- breast thickening inner quad    She has no cervical adenopathy.     She has rt axillary adenopathy.        Right: No supraclavicular adenopathy present.        Left: No supraclavicular adenopathy present.   Neurological: She is alert and oriented to person, place, and time. No cranial nerve deficit. Coordination normal.   Skin: Skin is warm and dry. No ecchymosis, no petechiae and no rash noted. No erythema.   Psychiatric: She has a normal mood and affect.             CT a/p w/contrast 11/29/2018   1. No acute abnormality identified within the abdomen and pelvis.    2.  There are a few nonspecific prominent lymph nodes in the upper abdomen including a periesophageal lymph node which measures 1.0 cm in short axis diameter.    3.  Significant abdominal aortic atherosclerosis and abdominal aorta ectasia.    4.  Additional findings as above.    PET/CT 1/26/2018   1.  Intense FDG uptake within the known right infrahilar lung mass, compatible with malignancy.  It is unclear if this represents primary lung malignancy or metastatic breast cancer.    2.  Intensely hypermetabolic right axillary, retropectoral, and lower right cervical lymph nodes, compatible " with metastases.    3.  Abnormal FDG uptake along right breast skin thickening, new from prior chest CTA and mammogram.  This may relate to localized edema/inflammation, though correlation with physical exam and mammography are recommended to exclude underlying inflammatory carcinoma.      MRI Brain w w/out contrast 2/9/2018  1.  No evidence of intracranial metastases.  2.  Sinus disease       Rt axillary LN bx at outside facility- on 1/16/2018 at [facility]  Pathology revealed metastatic poorly differentiated carcinoma of unknown primary  site 1/16/2018 at [facility]  TTF-1 negative, napsin negative  , cytokeratin 7 positive, cytokeratin 20 negative, p63 negative, cytokeratin 5/6 focal dim positivity    LUNG BIOPSY 1/31/2018  Pathology revealed benign lung tissue         SPECIMEN  1) Right Lung Bx 2/14/2018  Supplemental Diagnosis  Immunohistochemical stains show strong nuclear staining for p63 in essentially all tumor cells and very strong  cytoplasmic and membrane staining for CK5/6, also in essentially all tumor cells. TTF-1 and CK7 are negative  within tumor cells but do stain native pulmonary elements present within the biopsy. A stain for mucicarmine is  negative. Positive and negative controls function appropriately.  Final diagnosis: Specimen submitted as right lung biopsy  -Squamous cell carcinoma  (Electronically Signed: 2018-02-20 09:12:07 )  Diagnosed by: Phong Thompson  FINAL PATHOLOGIC DIAGNOSIS  Fragments of pulmonary parenchyma (submitted as right lung biopsy):    FINAL PATHOLOGIC DIAGNOSIS 1. Lymph node, right axilla, biopsy, review of 10 outside slides [facility], JS 18 [phone],  collected January 11, 2018: Metastatic poorly differentiated carcinoma (see comment).  The histologic section shows fibrous stroma infiltrated by nest of poorly differentiated malignant cells including  occasional dyskeratotic cells morphologically suggestive of metastatic  squamous cell carcinoma.  Immunohistochemical stains are nonspecific; the cells are positive for cytokeratin 7 and cytokeratin 5/6 (focal) and  negative for cytokeratin 20, TTF-1, p63, GCDFP, mammoglobin, and CEA        PET/CT 2/21/2019  Increased size and irregularity of the right axillary lymph node with increased FDG avidity.  Although this would be atypical in location for lung carcinoma, the increased size and avidity must be concerning for metastatic disease in this patient with history of squamous cell lung cancer.     US Rt breast and mammo 2/26/2019  Impression:  Right  Lymph Node: Right axilla lymph node. Assessment: 4 - Suspicious finding. Biopsy is recommended.      BI-RADS Category:   Right: 4 - Suspicious  Overall: 4 - Suspicious     Recommendation:  Biopsy is recommended. Biopsy of the lymph node measuring 1.4 x 1.1 x 1.3 recommended , the one with the round shape configuration, corresponding to the most recent PET CT finding.         Pathology 3/11/2019   FINAL PATHOLOGIC DIAGNOSIS  Right axilla, mass, core biopsy:  Positive for poorly differentiated carcinoma.  See comment.  Comment:  The biopsy from the right axilla mass shows a poorly differentiated carcinoma in background lymphoid tissue  with enlarged cells showing increased nuclear size, prominent nucleoli, moderate amounts of cytoplasm and mitotic  figures. Immunostains are completed and reveal the tumor cells to stain positively with cytokeratin AE 1/AE3, CK  5/6 and CK 7. The tumor cells are negative for CK 20, Estrogen receptor, p63 and TTF-1. All stains have  satisfactory positive and negative controls. The patient's prior cases will be reviewed and an additional stain for  JUAREZ-3 is pending in an attempt to pinpoint the primary site of this malignancy and results will follow in a  supplemental report.      Supplemental Diagnosis  3/11/2019  The current axillary mass is compared to the patient's prior right lung biopsy (case number  [id number]) and the  current axillary mass is morphologically similar to the lung malignancy. Also, the current axillary mass is compared  to the patient's prior breast resection (Case number [id number]) and appears similar as well. P63 is negative in the  [id number] tumor and CK 5/6 shows scattered positive staining in the [id number] tumor.  Immunostain for JUAREZ-3 is completed and shows only rare weak to moderate staining within the tumor cell nuclei  with satisfactory positive and negative controls. This stain is positive in the surrounding lymphocytes. This staining  pattern is non-specific and does not definitively differentiate between a lung and breast primary malignancy in this  right axilla mass biopsy. The staining profile of the current axillary mass match more closely with the previous  breast carcinoma (due to the p63 negativity in both the 2014 breast cancer and the current axillary mass lesion but  p63 positivity in the lung mass from 2018).  This staining profile, together with the comparison with the patient's prior breast tumor and prior lung tumor supports  a diagnosis of poorly differentiated carcinoma and the malignancy appears morphologically similar to the prior  malignancies from both sites, but the staining profile in this small sample from the axillary mass more closely  correlates with the prior breast malignancy. Pathologic subclassification of this malignancy's primary location is not  definitive and clinical correlation is recommended definitively decide on possible primary location in this patient's  axillary mass sample.  Supplemental (2):  Additional immunohistochemical staining for progesterone receptor and HER2 are completed per clinician request  with following results:  Progesterone receptor: Negative; 0% nuclear tumor cell staining.  HER2: Negative; stain score = 0.  Supplemental (3):  [place] DIAGNOSIS:  FINAL DIAGNOSIS  Breast, right, needle core biopsy ([id number]; 06/17/2014):  Invasive ductal carcinoma, Jaiden grade III (of  III), with focal micropapillary features.  Immunohistochemical stains performed at the referring institution show that the tumor cells have the following  phenotype: - Estrogen receptor: Negative (0% tumor cells staining). - Progesterone receptor: Negative (0% tumor  cells staining). - HER2: Negative (score 0).  Lymph nodes, right, sentinel biopsy and lumpectomy ([id number]; 07/11/2014):  1. Right sentinel lymph node: A single (1) lymph node is negative for metastatic carcinoma.  Immunohistochemical stains performed by the referring institution and reviewed at [facility] for cytokeratin are  negative, confirming the diagnosis.  2. Right breast: Invasive ductal carcinoma with micropapillary features, per report 11.5 mm in greatest dimension.  Foci suspicious for lymphovascular invasion identified. Margins of resection are free of tumor.  Immunohistochemical stains performed by the referring institution and reviewed at [facility] show that the tumor  cells are negative for p63 and show patchy positivity for CK 5/6.  Lung, right, needle core biopsy ([id number]; 02/14/2018): Squamous cell carcinoma.    Immunohistochemical stains performed by the referring institution show the tumor cells are strongly positive for p63  and CK 5/6, while negative for TTF-1 and CK7. These result support the diagnosis. Axilla, right, needle core biopsy  ([id number]; 03/11/2019): Lymph node positive for metastatic carcinoma, most consistent with breast primary  (see comment).  Immunohistochemical stains performed by the referring institution and reviewed at [facility] show that the tumor  cells are negative for p63, focally positive for CK 5/6, focally and weakly positive for JUAREZ-3, and strongly positive  for CK7. They are also negative for estrogen receptor, progesterone receptor, and HER2 JAM (score 0).  COMMENT:  Thank you for allowing me to review the case of this 73-year-old lady  who recently underwent needle core biopsies  of a right axillary mass and who has a previous diagnosis of an invasive ductal carcinoma of the right breast, as well  as a squamous cell carcinoma of the lung. I am reviewing this case because of my special interest in breast  pathology.  I agree with your interpretation of this case. In my opinion, the lung tumor corresponds to a squamous cell  carcinoma and appears to be unrelated to the invasive ductal carcinoma of the breast. The axillary mass, in my  opinion, corresponds to metastases of the breast primary. Although it is a poorly differentiated carcinoma, the  immunophenotype is most consistent with the one that the breast primary exhibited, and is inconsistent with a  metastatic squamous cell carcinoma. I did share the case with Dr. Anabel Stone, one of our pulmonary pathologists,  and she concurs with this interpretation.  Note: Report attached.  Performing location:  [place] [address]      LYMPH NODE, RIGHT AXILLA, BIOPSY: 6/16/21   - Metastatic poorly-differentiated carcinoma, consistent with breast primary  -ER, AR, and HER2 immunohistochemical hormonal markers are also negative  PD-L1 (22C3) SemiQuant IHC, Manual  Interpretation  Right axillary lymph node , specimen for PD-L1 immunohistochemistry studies (clone 22C3, [employer]  [employer], [place], [place]; using a proprietary detection system) ([id number]):  Reported carcinoma site of origin: Breast  Result: The percent of PD-L1 positive cells based upon the total number of tumor cells (combined positive  score, CPS) is greater than or equal to 10.  Interpretation: Studies suggest that positive PD-L1 immunohistochemistry in tumor cells and/or tumorassociated  immune cells may predict tumor response to therapy with immune checkpoint inhibitors. This  result should not be used as the sole factor in determining treatment, as other factors  (for example, tumor  mutation burden, and microsatellite instability) have been also studied as predictive markers.          CT neck/chest/abd/pelvis w/contrast 4/26/2019   The previously described right hilar mass is no longer visible.  There is persistent volume loss in the right middle lobe this appears similar to the prior PET-CT February 21, 2019.    Persistent abnormal right axillary node today measuring about 15 mm compared 18 mm prior.  The positioning of the adjacent nodes is slightly different likely accounting for the slight difference in measurement.    Cluster of tree-in-bud nodular densities left lower lobe series 2, image 93.  This may be related to small airways disease though serial follow-up suggested.      PET/CT 6/20/2019   New and worsening hypermetabolic lymph nodes concerning for metastatic breast cancer as described above.  Tissue sampling would be required for definitive diagnosis.      2-D echo 6/27/2019   Normal left ventricular systolic function. The estimated ejection fraction is 55%  Concentric left ventricular remodeling.  Normal LV diastolic function.  Normal right ventricular systolic function.  Moderate left atrial enlargement.  Mild right atrial enlargement.  Mild aortic regurgitation.  Mild mitral regurgitation.  Mild tricuspid regurgitation.  Normal central venous pressure (3 mm Hg).  The estimated PA systolic pressure is 25 mm Hg      PET/CT 9/19/2019   Favorable response to therapy with interval decrease in size and uptake of several right axillary lymph nodes and a supraclavicular lymph node.  Mild residual activity may indicate viable tumor.    No new hypermetabolic findings worrisome for malignancy.    PET/CT 2/28/2020  1.  No evidence of hypermetabolic tumor.  Continued improvement of the right axilla status post adjuvant radiation.    2.  No new hypermetabolic lesions      CTA 6/17/2020  1. No evidence of pulmonary embolus. No aortic dissection.   2. There is no evidence  for new or progressive disease in the chest as compared to PET/CT 6/20/2019 in this patient with history of right breast cancer and right lung neoplasm. The patient does have a more recent PET/CT 2/28/2020 from an outside facility per the electronic medical record although images are not available for direct comparison. Correlation with these images may be beneficial. Continued long-term surveillance recommended.  3. Stable appearance of the treated tumor in the right hilum/middle lobe.  4. Stable treated right breast cancer and axillary adenopathy without evidence for developing breast mass or new right axillary adenopathy.  5. Stable tiny nodularity/tree-in-bud densities in the left lung, probably postinfectious or inflammatory.  6. Wall thickening of the esophagus may be correlated for esophagitis. Possible tiny hiatus hernia.  7. Slight bronchial wall thickening may be correlated for bronchitis.  8. Mild to moderate emphysema as before.  9. Reflux of contrast into the IVC and hepatic veins is nonspecific but may be correlated for elevated right heart pressures.  10. Coronary calcifications and/or stents similar to prior.    Echo 5/21/2020  Technically difficult study.   Normal left ventricular systolic function.   Left ventricular ejection fraction is estimated at 55%.   Severe mitral annular calcification.   Mild mitral valve regurgitation.   Aortic valve sclerosis without stenosis or regurgitation.     PFTs 6/17/2020  Spirometry reveals a very severe obstructive lung defect, which does not significantly improve post bronchodilators. Lung volumes revealed air trapping. Diffusing capacity was moderately impaired.        Del 6/26/2020  BI-RADS Category:   Overall: 2 - Benign      PET/CT 10/23/2020   Impression:     Stable mildly hypermetabolic right axillary lymph node/nodular density contralateral to the site of injection.  Differential considerations include metastatic lymph node, reactive lymph node from  benign right upper extremity process, and fat necrosis.  Recommend physical examination of the upper extremity and consideration of ultrasound for further evaluation of the node/nodule and possible image guided biopsy.  Otherwise, attention on follow-up.     Otherwise, no other hypermetabolic disease identified      Mammo diagnostic right /US 11/4/2020   Impression:   1. No suspicious finding either on mammogram or ultrasound at the site of the palpable lump in the right breast at 10:00 o'clock. A bilateral mammogram is recommended in June 2020 to return to the patient to annual screening.   2. Redemonstration of the morphologically abnormal lymph node in the right axilla that contains a biopsy clip, compatible with the known recurrence metastatic breast cancer that has been treated with chemotherapy. The appearance of this lymph node is unchanged from 06/26/2020 and overall appears smaller when compared to 03/11/2019.     Abd US 12/11/2020   Impression:     1. Echogenic liver likely representing hepatic steatosis.  2. Right upper quadrant ultrasound otherwise is unremarkable.     PET/CT 10/14/21     Impression:     1.  No definite evidence of hypermetabolic tumor.  Interval resolution of hypermetabolic right axillary lymph nodes.  No new hypermetabolic findings.     2.  Persistent low-grade diffuse cutaneous uptake which is nonspecific.    MRI shoulder w w/out contrast 1/26/22   Impression:     Mild edema and postcontrast enhancement at the myotendinous junction of the supraspinatus and infraspinatus, it is nonspecific and could be due to degenerative change or tendinosis.     Small area of interstitial tear at the footprint insertion of the infraspinatus tendon.     No large rotator cuff tear.     No advanced degenerative change.     No osseous lesions.     PET/CT 1/26/22  Impression:In this patient with breast cancer, there is no evidence of hypermetabolic tumor to suggest recurrent or metastatic disease.   Less  extensive but, nonspecific, low-grade diffuse cutaneous uptake of the right breast.       PET/CT 4/27/22  Impression:     1.  No FDG avid disease to suggest recurrence or metastatic disease.     Unchanged right breast skin thickening with mild FDG uptake.       PET/CT 7/13/22   Impression:     In this patient with lung and breast cancer, there is new left lower lobe opacification with mild radiotracer uptake which may represent aspiration, pneumonia, or malignancy.  Tissue sampling would be required for definitive diagnosis.     Unchanged right breast skin thickening with mild radiotracer uptake.    CT chest w/contrast 8/25/22    Decreasing patchy pulmonary consolidation within the left lower lobe when compared to prior PET-CT scan dated 07/13/2022.  The overall appearance of the patchy consolidation as well as the moderate improvement when compared to the prior study suggest a benign etiology such as left lower lobe pneumonia.     Persistent band of atelectasis/scarring within the right middle lobe, stable dating back to 04/26/2019.     Coronary artery atherosclerosis in a multi vessel distribution.     There are no measurable lesions per RECIST criteria.    PET /CT  11/21/22 shows New right lower lobe infiltrate worrisome for pneumonia or aspiration.Chronic infiltrate right middle lobe.   Resolution of the left lower lobe infiltrate.    Mammo 7/26/22  BI-RADS Category:   Overall: 2 - Benign        CT chest with contrast 11/21/22    Impression:     New right lower lobe infiltrate worrisome for pneumonia or aspiration.     Chronic infiltrate right middle lobe.     Resolution of the left lower lobe infiltrate.     Coronary artery calcifications.         PET/CT 1/11/23  Impression:     1. In this patient with lung and breast cancer, there is stable right breast skin thickening with mild radiotracer uptake.  2. Interval resolution of hypermetabolic opacification in the left lower lobe as compared to FDG PET-CT  07/14/2022.  Interval improvement in patchy opacities in the right lower lobe as compared to CT 11/21/2022    .    Assessment:       1. Recurrent breast cancer, unspecified laterality    2. History of lung cancer    3. History of pneumonia    4. Chronic obstructive pulmonary disease, unspecified COPD type              Plan:     1.,2.   Pt with hx of Stage 1A invasive ductal carcinoma rt breast s/p rt segmental mastectomy and SLN bx 7/11/2014 ER/GA neg HER 2 jose maria neg [id number](i-).  S/p adjuvant RT completed 9/2014 Pt declined adjuvant chemo  Pt  hospitalized 11/2017 with acute-on-chronic  resp failure  Abnormal CT imaging revealing Large right hilar mass with involvement of  adjacent segmental pulmonary arterial branches and bronchi with associated postobstructive atelectasis  and involvement of mediastinal and axillary adenopathy   S/p rt axillary LN bx ( outside facility) - metastatic poorly differentiated carcinoma of unknown primary  site  status post  lung biopsy 1/31/2017 -benign lung tissue  PET/CT 1/26/2018   Intense FDG uptake within the known right infrahilar lung mass, compatible with malignancy.   Intensely hypermetabolic right axillary, retropectoral, and lower right cervical lymph nodes, compatible with metastases.  Abnormal FDG uptake along right breast skin thickening, new from prior chest CTA and mammogram.    Repeat lung bx 2/14/2018 revealed Squamous Cell CA lung PD-L1 10% EGFR NEGALK NEG  Pt treated for advanced squamous cell CA of lung She s/p  cycle 6 of carboplatin/Abraxane Day1 completed 7/2/2018 ( day 15 held due to prolonged cytopenias)  She completed therapy with near CR     PET/CT 2/21/2019 showed increased size and irregularity of the right axillary lymph node with increased FDG avidity     MAMMO/US rt breast 3/2019  reveals suspicious findings rt axilla LN.  Biopsy of the lymph node measuring 1.4 x 1.1 x 1.3     Pt s/p  rt axillary LN bxPositive for poorly differentiated carcinoma.- likely  breast primary ERneg PRneg Her 2 neg    Outside review of specimen(s) revealed  lung tumor corresponds to a squamous cell carcinoma and appears to be unrelated to the invasive ductal carcinoma of the breast. It was determined The axillary mass, in my opinion, corresponded  to metastases of the breast primary. Although it is a poorly differentiated carcinoma, theimmunophenotype is most consistent with the one that the breast primary exhibited, and is inconsistent with a metastatic squamous cell carcinoma.     CT imaging 4/26/2019 persistent rt axillary LAD, no other sites of disease     Lymph node biopsy 3/11/2019 Right axilla, mass, core biopsy:Positive for poorly differentiated carcinoma.favor breast primary     PET/CT 6/20/2019  New and worsening hypermetabolic lymph nodes concerning for metastatic breast cancer     Previously Discussed  imaging findings in detail with patient which reveals new and worsening hypermetabolic melena metabolic lymph nodes including hypermetabolic supraclavicular node.  Therefore, at treatment option will be systemic chemotherapy in light of the recent imaging findings.  She will be followed by her surgical oncologist Dr. Christopher      IT was determined to proceed with systemic chemotherapy   S/p  AC [id number] x 3 wks x 4 wks completed 9/6/2019   ( pt has not received in past)      PET/CT 9/19/2019 shows disease response with interval decrease in size and uptake of several right axillary lymph nodes and a supraclavicular lymph node.  Mild residual activity may indicate viable tumor.No new hypermetabolic findings worrisome for malignancy.    Pt followed by  Breast Surgery and plan was  for possible   ALND. Pt with Recurrent cancer in her right axilla and right supraclavicular fossa.   She completed chemotherapy 9/6/2019 with near CR  It was determined  Radiation will be given to the original areas of hypermetabolic activity in the right axilla and right supraclavicular region    Pt  completed  RT 12/16/2019     PET/CT 1/14/21 shows   New right axillary hypermetabolic lymph nodes with preserved normal architecture suggestive of reactive nodes.  Stable appearing previously identified hypermetabolic lymph node with mild increase in surrounding fat stranding.        Findings previously d/w with treating breast surgeon and it was determined to cont to monitor and close f/u as pt is heavily pre treated, has received RT to site   Pt acknowledged understanding and agreeable with plan     PET/CT imaging  5/20/21 shows Continued increase in both size and hypermetabolic activity of right axillary level 1 lymph nodes a new, mildly hypermetabolic cutaneous thickening of the right breast.     Right breast, skin, punch biopsy:Negative for carcinoma    LYMPH NODE, RIGHT AXILLA, BIOPSY: 6/16/21   - Metastatic poorly-differentiated carcinoma, consistent with breast primary triple neg  PD-L1 immunohistochemistry studies(combined positive score, CPS) is greater than or equal to 10   PET/CT showed  No definite evidence of hypermetabolic tumor.  Interval resolution of hypermetabolic right axillary lymph nodes.  No new hypermetabolic findings.      S/p C16 pembrolizumab 6/15/22   Last  PET/CT imaging shows  new left lower lobe opacification with mild radiotracer uptake which may represent aspiration, pneumonia, or malignancy.  Recent follow-up imaging studies decreasing patchy pulmonary consolidation within the left lower lobe when compared to prior PET-CT scan dated 07/13/2022.  Plan to remain off of therapy   Follow-up PET/CT imaging 1/11/23 interval resolution of hypermetabolic opacification in the left lower lobe as    3. ,4.F/b Pulmonology  Recently treated for pneumonia with improvement in sx's  Pt on supp 02 at night   Cont MDI and O2 as prescribed         Cbc,cmp, Mag,  prior to follow-up 2mos  PAC FLUSH today     Advance Care Planning     Power of   I previously  initiated the process of advance care  planning today and explained the importance of this process to the patient.  I introduced the concept of advance directives to the patient, as well. Then the patient received detailed information about the importance of designating a Health Care Power of  (HCPOA). She was also instructed to communicate with this person about their wishes for future healthcare, should she become sick and lose decision-making capacity. The patient has previously appointed a HCPOA. After our discussion, the patient has decided to complete a HCPOA and has appointed her son and niece and  I spent a total time of 16 minutes discussing this issue with the patient.         Cc: Swetha Katz M.D.         MD Ranjan Roberson MD

## 2023-02-07 ENCOUNTER — PES CALL (OUTPATIENT)
Dept: ADMINISTRATIVE | Facility: CLINIC | Age: 78
End: 2023-02-07
Payer: MEDICARE

## 2023-02-28 ENCOUNTER — TELEPHONE (OUTPATIENT)
Dept: FAMILY MEDICINE | Facility: CLINIC | Age: 78
End: 2023-02-28
Payer: MEDICARE

## 2023-02-28 NOTE — TELEPHONE ENCOUNTER
Call returned. Patient scheduled for next available appointment with PCP on 3/28/2023 at 9:40 am.

## 2023-02-28 NOTE — TELEPHONE ENCOUNTER
----- Message from Gisele Oleary sent at 2/28/2023 11:07 AM CST -----  Regarding: Requesting Advice  .Type:  Needs Medical Advice    Who Called:  self     Symptoms (please be specific): back and leg pain/ cough both dry and productive     How long has patient had these symptoms:  weeks     Pharmacy name and phone #:  .  [facility] - [place], [place] - [address]   [address] [address] [address]  Phone: [phone] Fax: [phone]    Would the patient rather a call back or a response via [facility]? Call     Best Call Back Number:  [phone]      Additional Information:

## 2023-03-06 ENCOUNTER — TELEPHONE (OUTPATIENT)
Dept: ADMINISTRATIVE | Facility: CLINIC | Age: 78
End: 2023-03-06
Payer: MEDICARE

## 2023-03-06 NOTE — TELEPHONE ENCOUNTER
Called pt, informed pt I was calling to remind pt of her in office EAWV on 3/7/23; clinic location provided to patient; pt confirmed appointment

## 2023-03-07 ENCOUNTER — OFFICE VISIT (OUTPATIENT)
Dept: FAMILY MEDICINE | Facility: CLINIC | Age: 78
End: 2023-03-07
Payer: MEDICARE

## 2023-03-07 VITALS
RESPIRATION RATE: 16 BRPM | WEIGHT: 159.19 LBS | BODY MASS INDEX: 28.21 KG/M2 | HEIGHT: 63 IN | TEMPERATURE: 98 F | HEART RATE: 78 BPM | OXYGEN SATURATION: 97 % | SYSTOLIC BLOOD PRESSURE: 112 MMHG | DIASTOLIC BLOOD PRESSURE: 70 MMHG

## 2023-03-07 DIAGNOSIS — I50.32 CHRONIC HEART FAILURE WITH PRESERVED EJECTION FRACTION: ICD-10-CM

## 2023-03-07 DIAGNOSIS — I25.118 CORONARY ARTERY DISEASE OF NATIVE ARTERY OF NATIVE HEART WITH STABLE ANGINA PECTORIS: ICD-10-CM

## 2023-03-07 DIAGNOSIS — C79.9 SECONDARY MALIGNANT NEOPLASM OF UNSPECIFIED SITE (CODE): ICD-10-CM

## 2023-03-07 DIAGNOSIS — T45.1X5A IMMUNODEFICIENCY DUE TO CHEMOTHERAPY: ICD-10-CM

## 2023-03-07 DIAGNOSIS — Z00.00 ENCOUNTER FOR PREVENTIVE HEALTH EXAMINATION: Primary | ICD-10-CM

## 2023-03-07 DIAGNOSIS — I10 PRIMARY HYPERTENSION: ICD-10-CM

## 2023-03-07 DIAGNOSIS — J96.11 CHRONIC RESPIRATORY FAILURE WITH HYPOXIA, ON HOME O2 THERAPY: ICD-10-CM

## 2023-03-07 DIAGNOSIS — C34.90 SQUAMOUS CELL CARCINOMA OF LUNG, UNSPECIFIED LATERALITY: Chronic | ICD-10-CM

## 2023-03-07 DIAGNOSIS — C50.919 METASTATIC BREAST CANCER: Chronic | ICD-10-CM

## 2023-03-07 DIAGNOSIS — Z79.899 IMMUNODEFICIENCY DUE TO CHEMOTHERAPY: ICD-10-CM

## 2023-03-07 DIAGNOSIS — I70.0 ATHEROSCLEROSIS OF AORTA: Chronic | ICD-10-CM

## 2023-03-07 DIAGNOSIS — D84.821 IMMUNODEFICIENCY DUE TO CHEMOTHERAPY: ICD-10-CM

## 2023-03-07 DIAGNOSIS — J43.2 CENTRILOBULAR EMPHYSEMA: ICD-10-CM

## 2023-03-07 DIAGNOSIS — Z99.81 CHRONIC RESPIRATORY FAILURE WITH HYPOXIA, ON HOME O2 THERAPY: ICD-10-CM

## 2023-03-07 DIAGNOSIS — R73.03 PREDIABETES: ICD-10-CM

## 2023-03-07 DIAGNOSIS — C34.80 MALIGNANT NEOPLASM OF OVERLAPPING SITES OF LUNG, UNSPECIFIED LATERALITY: ICD-10-CM

## 2023-03-07 PROBLEM — J44.9 STAGE 3 SEVERE COPD BY GOLD CLASSIFICATION: Status: RESOLVED | Noted: 2018-10-25 | Resolved: 2023-03-07

## 2023-03-07 PROBLEM — I95.1 ORTHOSTATIC HYPOTENSION: Status: RESOLVED | Noted: 2022-04-06 | Resolved: 2023-03-07

## 2023-03-07 PROCEDURE — 1101F PT FALLS ASSESS-DOCD LE1/YR: CPT | Mod: HCNC,CPTII,S$GLB, | Performed by: NURSE PRACTITIONER

## 2023-03-07 PROCEDURE — 3074F PR MOST RECENT SYSTOLIC BLOOD PRESSURE < 130 MM HG: ICD-10-PCS | Mod: HCNC,CPTII,S$GLB, | Performed by: NURSE PRACTITIONER

## 2023-03-07 PROCEDURE — 99999 PR PBB SHADOW E&M-EST. PATIENT-LVL IV: CPT | Mod: PBBFAC,HCNC,, | Performed by: NURSE PRACTITIONER

## 2023-03-07 PROCEDURE — 99999 PR PBB SHADOW E&M-EST. PATIENT-LVL IV: ICD-10-PCS | Mod: PBBFAC,HCNC,, | Performed by: NURSE PRACTITIONER

## 2023-03-07 PROCEDURE — 3078F DIAST BP <80 MM HG: CPT | Mod: HCNC,CPTII,S$GLB, | Performed by: NURSE PRACTITIONER

## 2023-03-07 PROCEDURE — 1126F PR PAIN SEVERITY QUANTIFIED, NO PAIN PRESENT: ICD-10-PCS | Mod: HCNC,CPTII,S$GLB, | Performed by: NURSE PRACTITIONER

## 2023-03-07 PROCEDURE — 3078F PR MOST RECENT DIASTOLIC BLOOD PRESSURE < 80 MM HG: ICD-10-PCS | Mod: HCNC,CPTII,S$GLB, | Performed by: NURSE PRACTITIONER

## 2023-03-07 PROCEDURE — 1101F PR PT FALLS ASSESS DOC 0-1 FALLS W/OUT INJ PAST YR: ICD-10-PCS | Mod: HCNC,CPTII,S$GLB, | Performed by: NURSE PRACTITIONER

## 2023-03-07 PROCEDURE — 3288F FALL RISK ASSESSMENT DOCD: CPT | Mod: HCNC,CPTII,S$GLB, | Performed by: NURSE PRACTITIONER

## 2023-03-07 PROCEDURE — 1157F PR ADVANCE CARE PLAN OR EQUIV PRESENT IN MEDICAL RECORD: ICD-10-PCS | Mod: HCNC,CPTII,S$GLB, | Performed by: NURSE PRACTITIONER

## 2023-03-07 PROCEDURE — G0439 PPPS, SUBSEQ VISIT: HCPCS | Mod: HCNC,S$GLB,, | Performed by: NURSE PRACTITIONER

## 2023-03-07 PROCEDURE — G0439 PR MEDICARE ANNUAL WELLNESS SUBSEQUENT VISIT: ICD-10-PCS | Mod: HCNC,S$GLB,, | Performed by: NURSE PRACTITIONER

## 2023-03-07 PROCEDURE — 1157F ADVNC CARE PLAN IN RCRD: CPT | Mod: HCNC,CPTII,S$GLB, | Performed by: NURSE PRACTITIONER

## 2023-03-07 PROCEDURE — 3074F SYST BP LT 130 MM HG: CPT | Mod: HCNC,CPTII,S$GLB, | Performed by: NURSE PRACTITIONER

## 2023-03-07 PROCEDURE — 3288F PR FALLS RISK ASSESSMENT DOCUMENTED: ICD-10-PCS | Mod: HCNC,CPTII,S$GLB, | Performed by: NURSE PRACTITIONER

## 2023-03-07 PROCEDURE — 1159F MED LIST DOCD IN RCRD: CPT | Mod: HCNC,CPTII,S$GLB, | Performed by: NURSE PRACTITIONER

## 2023-03-07 PROCEDURE — 1160F PR REVIEW ALL MEDS BY PRESCRIBER/CLIN PHARMACIST DOCUMENTED: ICD-10-PCS | Mod: HCNC,CPTII,S$GLB, | Performed by: NURSE PRACTITIONER

## 2023-03-07 PROCEDURE — 1159F PR MEDICATION LIST DOCUMENTED IN MEDICAL RECORD: ICD-10-PCS | Mod: HCNC,CPTII,S$GLB, | Performed by: NURSE PRACTITIONER

## 2023-03-07 PROCEDURE — 1126F AMNT PAIN NOTED NONE PRSNT: CPT | Mod: HCNC,CPTII,S$GLB, | Performed by: NURSE PRACTITIONER

## 2023-03-07 PROCEDURE — 1160F RVW MEDS BY RX/DR IN RCRD: CPT | Mod: HCNC,CPTII,S$GLB, | Performed by: NURSE PRACTITIONER

## 2023-03-07 NOTE — PROGRESS NOTES
"  Ade Castellano presented for a  Medicare AWV and comprehensive Health Risk Assessment today. The following components were reviewed and updated:    Medical history  Family History  Social history  Allergies and Current Medications  Health Risk Assessment  Health Maintenance  Care Team         ** See Completed Assessments for Annual Wellness Visit within the encounter summary.**         The following assessments were completed:  Living Situation  CAGE  Depression Screening  Timed Get Up and Go  Whisper Test  Cognitive Function Screening  Nutrition Screening  ADL Screening  PAQ Screening        Vitals:    03/07/23 0943   BP: 112/70   Pulse: 78   Resp: 16   Temp: 97.5 °F (36.4 °C)   TempSrc: Oral   SpO2: 97%   Weight: 72.2 kg (159 lb 2.8 oz)   Height: 5' 3" (1.6 m)     Body mass index is 28.2 kg/m².  Physical Exam  Vitals and nursing note reviewed.   Constitutional:       General: She is not in acute distress.     Appearance: Normal appearance. She is well-developed and well-groomed. She is not ill-appearing.   HENT:      Head: Normocephalic and atraumatic.      Right Ear: External ear normal.      Left Ear: External ear normal.      Nose: Nose normal.      Mouth/Throat:      Lips: Pink.      Mouth: Mucous membranes are moist.   Eyes:      General: Lids are normal. Vision grossly intact. Gaze aligned appropriately.      Conjunctiva/sclera: Conjunctivae normal.   Neck:      Trachea: Phonation normal.   Pulmonary:      Effort: Pulmonary effort is normal. No accessory muscle usage or respiratory distress.   Abdominal:      General: Abdomen is flat. There is no distension.   Musculoskeletal:      Cervical back: Neck supple.   Skin:     General: Skin is warm and dry.      Findings: No rash.   Neurological:      General: No focal deficit present.      Mental Status: She is alert and oriented to person, place, and time. Mental status is at baseline.      Motor: No abnormal muscle tone.      Gait: Gait normal. "   Psychiatric:         Attention and Perception: Attention and perception normal.         Mood and Affect: Mood and affect normal.         Speech: Speech normal.         Behavior: Behavior normal. Behavior is cooperative.         Thought Content: Thought content normal.         Cognition and Memory: Cognition and memory normal.         Judgment: Judgment normal.               Diagnoses and health risks identified today and associated recommendations/orders:    1. Encounter for preventive health examination  Health maintenance reviewed and up to date, will f/u with PCP for routine preventative care  Review for Opioid Screening: Pt does not have Rx for Opioids   Review for Substance Use Disorders: Patient does not use substance     2. Metastatic breast cancer  Stable s/p lumpectomy and multiple episodes of chemotherapy and closely followed by oncology     3. Squamous cell carcinoma of lung, unspecified laterality  Stable, respiratory uncomplicated, s/p lumpectomy and multiple episodes of chemotherapy and closely followed by oncology     4. 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 Secondary malignant neoplasm of unspecified site (CODE)  S/p multiple rounds of chemotherapy for metastatic cancer, closely followed by oncologist    7. Centrilobular emphysema  Stable with hx of lung ca on supplemental oxygen PRN, SUNITHA CPAP intolerant, followed by pulmonology     8. Chronic respiratory failure with hypoxia, on home O2 therapy  Stable with hx of lung ca on supplemental oxygen PRN, SUNITHA CPAP intolerant, followed by pulmonology     9. Coronary artery disease of native artery of native heart with stable angina pectoris  Stable with chronic daily vasodilation, on imdur  without complication, followed by cards     10. Chronic heart failure with preserved ejection fraction  Stable on current regimen, closely followed by cards     11. Atherosclerosis of aorta  Asymptomatic, BP controlled, on statin and no anticoagulation, followed by cards     12. Primary hypertension  Controlled on BB, followed by cards     13. Prediabetes  The patient is asked to make an attempt to improve diet and exercise patterns to aid in medical management of this problem.      Provided Ade with a 5-10 year written screening schedule and personal prevention plan. Recommendations were developed using the USPSTF age appropriate recommendations. Education, counseling, and referrals were provided as needed. After Visit Summary printed and given to patient which includes a list of additional screenings\tests needed.    Follow up in about 1 year (around 3/7/2024).    Sofía Connell NP  I offered to discuss advanced care planning, including how to pick a person who would make decisions for you if you were unable to make them for yourself, called a health care power of , and what kind of decisions you might make such as use of life sustaining treatments such as ventilators and tube feeding when faced with a life limiting illness recorded on a living will that they will need to know. (How you want to be cared for as you near the end of your natural life)     X  Patient has advanced directives on file, which we reviewed, and they do not wish to make changes.

## 2023-03-07 NOTE — PATIENT INSTRUCTIONS
Counseling and Referral of Other Preventative  (Italic type indicates deductible and co-insurance are waived)    Patient Name: Ade Castellano  Today's Date: 3/7/2023    Health Maintenance       Date Due Completion Date    TETANUS VACCINE Never done ---    Shingles Vaccine (1 of 2) Never done ---    DEXA Scan 03/08/2020 3/8/2017    COVID-19 Vaccine (4 - Booster for Moderna series) 01/27/2022 12/2/2021    Hemoglobin A1c (Prediabetes) 04/07/2023 4/7/2022    Lipid Panel 10/17/2027 10/17/2022        No orders of the defined types were placed in this encounter.    The following information is provided to all patients.  This information is to help you find resources for any of the problems found today that may be affecting your health:                Living healthy guide: www.[facility].[place].gov      Understanding Diabetes: www.diabetes.org      Eating healthy: www.cdc.gov/healthyweight      [facility] home safety checklist: www.cdc.gov/steadi/patient.html      Agency on Aging: www.goea.[place].gov      Alcoholics anonymous (AA): www.aa.org      Physical Activity: www.anca.nih.gov/wz0ohrv      Tobacco use: www.quitwithusla.org

## 2023-03-20 ENCOUNTER — HOSPITAL ENCOUNTER (OUTPATIENT)
Dept: RADIOLOGY | Facility: CLINIC | Age: 78
Discharge: HOME | End: 2023-03-20
Attending: FAMILY MEDICINE
Payer: MEDICARE

## 2023-03-20 DIAGNOSIS — Z78.0 POST-MENOPAUSAL: ICD-10-CM

## 2023-03-20 PROCEDURE — 77080 DXA BONE DENSITY AXIAL: CPT | Mod: TC,HCNC,PO

## 2023-03-20 PROCEDURE — 77080 DXA BONE DENSITY AXIAL: CPT | Mod: 26,HCNC,, | Performed by: INTERNAL MEDICINE

## 2023-03-20 PROCEDURE — 77080 DEXA BONE DENSITY SPINE HIP: ICD-10-PCS | Mod: 26,HCNC,, | Performed by: INTERNAL MEDICINE

## 2023-03-21 PROBLEM — Z79.69 IMMUNODEFICIENCY DUE TO CHEMOTHERAPY: Status: ACTIVE | Noted: 2023-03-21

## 2023-03-21 PROBLEM — T45.1X5A IMMUNODEFICIENCY DUE TO CHEMOTHERAPY: Status: ACTIVE | Noted: 2023-03-21

## 2023-03-21 PROBLEM — J96.11 CHRONIC RESPIRATORY FAILURE WITH HYPOXIA, ON HOME O2 THERAPY: Status: ACTIVE | Noted: 2023-03-21

## 2023-03-21 PROBLEM — D84.821 IMMUNODEFICIENCY DUE TO CHEMOTHERAPY: Status: ACTIVE | Noted: 2023-03-21

## 2023-03-21 PROBLEM — Z79.899 IMMUNODEFICIENCY DUE TO CHEMOTHERAPY: Status: ACTIVE | Noted: 2023-03-21

## 2023-03-21 PROBLEM — Z99.81 CHRONIC RESPIRATORY FAILURE WITH HYPOXIA, ON HOME O2 THERAPY: Status: ACTIVE | Noted: 2023-03-21

## 2023-04-03 ENCOUNTER — INFUSION (OUTPATIENT)
Dept: INFUSION THERAPY | Facility: HOSPITAL | Age: 78
End: 2023-04-03
Attending: INTERNAL MEDICINE
Payer: MEDICARE

## 2023-04-03 ENCOUNTER — OFFICE VISIT (OUTPATIENT)
Dept: HEMATOLOGY/ONCOLOGY | Facility: CLINIC | Age: 78
End: 2023-04-03
Payer: MEDICARE

## 2023-04-03 VITALS
SYSTOLIC BLOOD PRESSURE: 119 MMHG | BODY MASS INDEX: 28.39 KG/M2 | OXYGEN SATURATION: 95 % | HEIGHT: 63 IN | WEIGHT: 160.25 LBS | DIASTOLIC BLOOD PRESSURE: 69 MMHG | HEART RATE: 53 BPM

## 2023-04-03 VITALS
OXYGEN SATURATION: 95 % | DIASTOLIC BLOOD PRESSURE: 59 MMHG | TEMPERATURE: 98 F | RESPIRATION RATE: 18 BRPM | SYSTOLIC BLOOD PRESSURE: 121 MMHG | HEART RATE: 51 BPM

## 2023-04-03 DIAGNOSIS — R94.6 ABNORMAL THYROID FUNCTION TEST: ICD-10-CM

## 2023-04-03 DIAGNOSIS — C34.90 SQUAMOUS CELL CARCINOMA LUNG: Primary | ICD-10-CM

## 2023-04-03 DIAGNOSIS — C50.919 RECURRENT BREAST CANCER, UNSPECIFIED LATERALITY: Primary | ICD-10-CM

## 2023-04-03 DIAGNOSIS — Z85.118 HISTORY OF LUNG CANCER: ICD-10-CM

## 2023-04-03 DIAGNOSIS — J44.9 CHRONIC OBSTRUCTIVE PULMONARY DISEASE, UNSPECIFIED COPD TYPE: ICD-10-CM

## 2023-04-03 PROCEDURE — 63600175 PHARM REV CODE 636 W HCPCS: Mod: HCNC | Performed by: INTERNAL MEDICINE

## 2023-04-03 PROCEDURE — 25000003 PHARM REV CODE 250: Mod: HCNC | Performed by: INTERNAL MEDICINE

## 2023-04-03 PROCEDURE — 96523 IRRIG DRUG DELIVERY DEVICE: CPT | Mod: HCNC

## 2023-04-03 PROCEDURE — 1157F ADVNC CARE PLAN IN RCRD: CPT | Mod: HCNC,CPTII,S$GLB, | Performed by: INTERNAL MEDICINE

## 2023-04-03 PROCEDURE — 3074F PR MOST RECENT SYSTOLIC BLOOD PRESSURE < 130 MM HG: ICD-10-PCS | Mod: HCNC,CPTII,S$GLB, | Performed by: INTERNAL MEDICINE

## 2023-04-03 PROCEDURE — A4216 STERILE WATER/SALINE, 10 ML: HCPCS | Mod: HCNC | Performed by: INTERNAL MEDICINE

## 2023-04-03 PROCEDURE — 99499 RISK ADDL DX/OHS AUDIT: ICD-10-PCS | Mod: HCNC,S$GLB,, | Performed by: INTERNAL MEDICINE

## 2023-04-03 PROCEDURE — 99999 PR PBB SHADOW E&M-EST. PATIENT-LVL III: ICD-10-PCS | Mod: PBBFAC,HCNC,, | Performed by: INTERNAL MEDICINE

## 2023-04-03 PROCEDURE — 99499 UNLISTED E&M SERVICE: CPT | Mod: HCNC,S$GLB,, | Performed by: INTERNAL MEDICINE

## 2023-04-03 PROCEDURE — 3288F PR FALLS RISK ASSESSMENT DOCUMENTED: ICD-10-PCS | Mod: HCNC,CPTII,S$GLB, | Performed by: INTERNAL MEDICINE

## 2023-04-03 PROCEDURE — 3288F FALL RISK ASSESSMENT DOCD: CPT | Mod: HCNC,CPTII,S$GLB, | Performed by: INTERNAL MEDICINE

## 2023-04-03 PROCEDURE — 1157F PR ADVANCE CARE PLAN OR EQUIV PRESENT IN MEDICAL RECORD: ICD-10-PCS | Mod: HCNC,CPTII,S$GLB, | Performed by: INTERNAL MEDICINE

## 2023-04-03 PROCEDURE — 1101F PR PT FALLS ASSESS DOC 0-1 FALLS W/OUT INJ PAST YR: ICD-10-PCS | Mod: HCNC,CPTII,S$GLB, | Performed by: INTERNAL MEDICINE

## 2023-04-03 PROCEDURE — 3078F DIAST BP <80 MM HG: CPT | Mod: HCNC,CPTII,S$GLB, | Performed by: INTERNAL MEDICINE

## 2023-04-03 PROCEDURE — 3074F SYST BP LT 130 MM HG: CPT | Mod: HCNC,CPTII,S$GLB, | Performed by: INTERNAL MEDICINE

## 2023-04-03 PROCEDURE — 99999 PR PBB SHADOW E&M-EST. PATIENT-LVL III: CPT | Mod: PBBFAC,HCNC,, | Performed by: INTERNAL MEDICINE

## 2023-04-03 PROCEDURE — 1126F PR PAIN SEVERITY QUANTIFIED, NO PAIN PRESENT: ICD-10-PCS | Mod: HCNC,CPTII,S$GLB, | Performed by: INTERNAL MEDICINE

## 2023-04-03 PROCEDURE — 3078F PR MOST RECENT DIASTOLIC BLOOD PRESSURE < 80 MM HG: ICD-10-PCS | Mod: HCNC,CPTII,S$GLB, | Performed by: INTERNAL MEDICINE

## 2023-04-03 PROCEDURE — 1159F PR MEDICATION LIST DOCUMENTED IN MEDICAL RECORD: ICD-10-PCS | Mod: HCNC,CPTII,S$GLB, | Performed by: INTERNAL MEDICINE

## 2023-04-03 PROCEDURE — 1126F AMNT PAIN NOTED NONE PRSNT: CPT | Mod: HCNC,CPTII,S$GLB, | Performed by: INTERNAL MEDICINE

## 2023-04-03 PROCEDURE — 99214 OFFICE O/P EST MOD 30 MIN: CPT | Mod: HCNC,S$GLB,, | Performed by: INTERNAL MEDICINE

## 2023-04-03 PROCEDURE — 1101F PT FALLS ASSESS-DOCD LE1/YR: CPT | Mod: HCNC,CPTII,S$GLB, | Performed by: INTERNAL MEDICINE

## 2023-04-03 PROCEDURE — 99214 PR OFFICE/OUTPT VISIT, EST, LEVL IV, 30-39 MIN: ICD-10-PCS | Mod: HCNC,S$GLB,, | Performed by: INTERNAL MEDICINE

## 2023-04-03 PROCEDURE — 1159F MED LIST DOCD IN RCRD: CPT | Mod: HCNC,CPTII,S$GLB, | Performed by: INTERNAL MEDICINE

## 2023-04-03 RX ORDER — SODIUM CHLORIDE 0.9 % (FLUSH) 0.9 %
10 SYRINGE (ML) INJECTION
Status: DISCONTINUED | OUTPATIENT
Start: 2023-04-03 | End: 2023-04-03 | Stop reason: HOSPADM

## 2023-04-03 RX ORDER — HEPARIN 100 UNIT/ML
500 SYRINGE INTRAVENOUS
Status: DISCONTINUED | OUTPATIENT
Start: 2023-04-03 | End: 2023-04-03 | Stop reason: HOSPADM

## 2023-04-03 RX ORDER — HEPARIN 100 UNIT/ML
500 SYRINGE INTRAVENOUS
Status: CANCELLED | OUTPATIENT
Start: 2023-04-03

## 2023-04-03 RX ORDER — SODIUM CHLORIDE 0.9 % (FLUSH) 0.9 %
10 SYRINGE (ML) INJECTION
Status: CANCELLED | OUTPATIENT
Start: 2023-04-03

## 2023-04-03 RX ADMIN — HEPARIN 500 UNITS: 100 SYRINGE at 08:04

## 2023-04-03 RX ADMIN — Medication 10 ML: at 08:04

## 2023-04-03 NOTE — Clinical Note
Cbc,cmp, Mag,  prior to follow-up 2mos PET prior to f/u  at [facility] PAC FLUSH today

## 2023-04-03 NOTE — PLAN OF CARE
Pt arrived to unit for her maintenance PAC flush. VSS. Reports her breathing has been okay. Hasn't been as reliant on her oxygen but is still continuing with a cough due to the pollen count. PAC accessed and flushed without difficulty. Pt received her discharge instructions along with her next appointments. Discharged from unit in NAD.

## 2023-04-13 ENCOUNTER — TELEPHONE (OUTPATIENT)
Dept: HEMATOLOGY/ONCOLOGY | Facility: CLINIC | Age: 78
End: 2023-04-13
Payer: MEDICARE

## 2023-04-13 NOTE — TELEPHONE ENCOUNTER
----- Message from Tremontana Chevalier sent at 4/13/2023 12:27 PM CDT -----  Regarding: pt advice  Consult/Advisory    Name Of Caller: Ade      Contact Preference: [phone]    Nature of call: Pt says she is calling to spk with Michelle ORNELAS regarding a test she has to take tomorrow.

## 2023-04-25 ENCOUNTER — TELEPHONE (OUTPATIENT)
Dept: HEMATOLOGY/ONCOLOGY | Facility: CLINIC | Age: 78
End: 2023-04-25
Payer: MEDICARE

## 2023-04-25 ENCOUNTER — INFUSION (OUTPATIENT)
Dept: INFUSION THERAPY | Facility: HOSPITAL | Age: 78
End: 2023-04-25
Attending: INTERNAL MEDICINE
Payer: MEDICARE

## 2023-04-25 ENCOUNTER — OFFICE VISIT (OUTPATIENT)
Dept: CARDIOLOGY | Facility: CLINIC | Age: 78
End: 2023-04-25
Payer: MEDICARE

## 2023-04-25 VITALS
HEIGHT: 63 IN | DIASTOLIC BLOOD PRESSURE: 78 MMHG | HEART RATE: 56 BPM | BODY MASS INDEX: 28.52 KG/M2 | WEIGHT: 160.94 LBS | SYSTOLIC BLOOD PRESSURE: 122 MMHG | RESPIRATION RATE: 15 BRPM | OXYGEN SATURATION: 95 %

## 2023-04-25 DIAGNOSIS — C50.919 PRIMARY MALIGNANT NEOPLASM OF BREAST WITH METASTASIS: Chronic | ICD-10-CM

## 2023-04-25 DIAGNOSIS — I10 PRIMARY HYPERTENSION: Primary | ICD-10-CM

## 2023-04-25 DIAGNOSIS — I25.2 OLD MYOCARDIAL INFARCTION: ICD-10-CM

## 2023-04-25 DIAGNOSIS — I70.0 ATHEROSCLEROSIS OF AORTA: Chronic | ICD-10-CM

## 2023-04-25 DIAGNOSIS — C34.90 SQUAMOUS CELL CARCINOMA LUNG: Primary | ICD-10-CM

## 2023-04-25 DIAGNOSIS — R06.09 DOE (DYSPNEA ON EXERTION): ICD-10-CM

## 2023-04-25 DIAGNOSIS — C79.9 SECONDARY MALIGNANT NEOPLASM OF UNSPECIFIED SITE (CODE): ICD-10-CM

## 2023-04-25 DIAGNOSIS — D84.821 IMMUNODEFICIENCY DUE TO CHEMOTHERAPY: ICD-10-CM

## 2023-04-25 DIAGNOSIS — T45.1X5A IMMUNODEFICIENCY DUE TO CHEMOTHERAPY: ICD-10-CM

## 2023-04-25 DIAGNOSIS — I50.32 CHRONIC HEART FAILURE WITH PRESERVED EJECTION FRACTION: ICD-10-CM

## 2023-04-25 DIAGNOSIS — Z79.899 IMMUNODEFICIENCY DUE TO CHEMOTHERAPY: ICD-10-CM

## 2023-04-25 PROCEDURE — 3078F DIAST BP <80 MM HG: CPT | Mod: HCNC,CPTII,S$GLB, | Performed by: INTERNAL MEDICINE

## 2023-04-25 PROCEDURE — 1159F PR MEDICATION LIST DOCUMENTED IN MEDICAL RECORD: ICD-10-PCS | Mod: HCNC,CPTII,S$GLB, | Performed by: INTERNAL MEDICINE

## 2023-04-25 PROCEDURE — 99999 PR PBB SHADOW E&M-EST. PATIENT-LVL IV: ICD-10-PCS | Mod: PBBFAC,HCNC,, | Performed by: INTERNAL MEDICINE

## 2023-04-25 PROCEDURE — 99214 OFFICE O/P EST MOD 30 MIN: CPT | Mod: HCNC,S$GLB,, | Performed by: INTERNAL MEDICINE

## 2023-04-25 PROCEDURE — 1157F ADVNC CARE PLAN IN RCRD: CPT | Mod: HCNC,CPTII,S$GLB, | Performed by: INTERNAL MEDICINE

## 2023-04-25 PROCEDURE — 63600175 PHARM REV CODE 636 W HCPCS: Mod: HCNC | Performed by: INTERNAL MEDICINE

## 2023-04-25 PROCEDURE — 3288F FALL RISK ASSESSMENT DOCD: CPT | Mod: HCNC,CPTII,S$GLB, | Performed by: INTERNAL MEDICINE

## 2023-04-25 PROCEDURE — 1101F PR PT FALLS ASSESS DOC 0-1 FALLS W/OUT INJ PAST YR: ICD-10-PCS | Mod: HCNC,CPTII,S$GLB, | Performed by: INTERNAL MEDICINE

## 2023-04-25 PROCEDURE — 1126F AMNT PAIN NOTED NONE PRSNT: CPT | Mod: HCNC,CPTII,S$GLB, | Performed by: INTERNAL MEDICINE

## 2023-04-25 PROCEDURE — 93000 ELECTROCARDIOGRAM COMPLETE: CPT | Mod: HCNC,S$GLB,, | Performed by: INTERNAL MEDICINE

## 2023-04-25 PROCEDURE — 1157F PR ADVANCE CARE PLAN OR EQUIV PRESENT IN MEDICAL RECORD: ICD-10-PCS | Mod: HCNC,CPTII,S$GLB, | Performed by: INTERNAL MEDICINE

## 2023-04-25 PROCEDURE — 96523 IRRIG DRUG DELIVERY DEVICE: CPT | Mod: HCNC

## 2023-04-25 PROCEDURE — 93000 EKG 12-LEAD: ICD-10-PCS | Mod: HCNC,S$GLB,, | Performed by: INTERNAL MEDICINE

## 2023-04-25 PROCEDURE — 1160F RVW MEDS BY RX/DR IN RCRD: CPT | Mod: HCNC,CPTII,S$GLB, | Performed by: INTERNAL MEDICINE

## 2023-04-25 PROCEDURE — 3288F PR FALLS RISK ASSESSMENT DOCUMENTED: ICD-10-PCS | Mod: HCNC,CPTII,S$GLB, | Performed by: INTERNAL MEDICINE

## 2023-04-25 PROCEDURE — 1126F PR PAIN SEVERITY QUANTIFIED, NO PAIN PRESENT: ICD-10-PCS | Mod: HCNC,CPTII,S$GLB, | Performed by: INTERNAL MEDICINE

## 2023-04-25 PROCEDURE — A4216 STERILE WATER/SALINE, 10 ML: HCPCS | Mod: HCNC | Performed by: INTERNAL MEDICINE

## 2023-04-25 PROCEDURE — 3078F PR MOST RECENT DIASTOLIC BLOOD PRESSURE < 80 MM HG: ICD-10-PCS | Mod: HCNC,CPTII,S$GLB, | Performed by: INTERNAL MEDICINE

## 2023-04-25 PROCEDURE — 3074F SYST BP LT 130 MM HG: CPT | Mod: HCNC,CPTII,S$GLB, | Performed by: INTERNAL MEDICINE

## 2023-04-25 PROCEDURE — 3074F PR MOST RECENT SYSTOLIC BLOOD PRESSURE < 130 MM HG: ICD-10-PCS | Mod: HCNC,CPTII,S$GLB, | Performed by: INTERNAL MEDICINE

## 2023-04-25 PROCEDURE — 1159F MED LIST DOCD IN RCRD: CPT | Mod: HCNC,CPTII,S$GLB, | Performed by: INTERNAL MEDICINE

## 2023-04-25 PROCEDURE — 1160F PR REVIEW ALL MEDS BY PRESCRIBER/CLIN PHARMACIST DOCUMENTED: ICD-10-PCS | Mod: HCNC,CPTII,S$GLB, | Performed by: INTERNAL MEDICINE

## 2023-04-25 PROCEDURE — 99214 PR OFFICE/OUTPT VISIT, EST, LEVL IV, 30-39 MIN: ICD-10-PCS | Mod: HCNC,S$GLB,, | Performed by: INTERNAL MEDICINE

## 2023-04-25 PROCEDURE — 1101F PT FALLS ASSESS-DOCD LE1/YR: CPT | Mod: HCNC,CPTII,S$GLB, | Performed by: INTERNAL MEDICINE

## 2023-04-25 PROCEDURE — 99999 PR PBB SHADOW E&M-EST. PATIENT-LVL IV: CPT | Mod: PBBFAC,HCNC,, | Performed by: INTERNAL MEDICINE

## 2023-04-25 PROCEDURE — 25000003 PHARM REV CODE 250: Mod: HCNC | Performed by: INTERNAL MEDICINE

## 2023-04-25 RX ORDER — SODIUM CHLORIDE 0.9 % (FLUSH) 0.9 %
10 SYRINGE (ML) INJECTION
Status: DISCONTINUED | OUTPATIENT
Start: 2023-04-25 | End: 2023-04-25 | Stop reason: HOSPADM

## 2023-04-25 RX ORDER — HEPARIN 100 UNIT/ML
500 SYRINGE INTRAVENOUS
Status: DISCONTINUED | OUTPATIENT
Start: 2023-04-25 | End: 2023-04-25 | Stop reason: HOSPADM

## 2023-04-25 RX ORDER — SODIUM CHLORIDE 0.9 % (FLUSH) 0.9 %
10 SYRINGE (ML) INJECTION
Status: CANCELLED | OUTPATIENT
Start: 2023-04-25

## 2023-04-25 RX ORDER — HEPARIN 100 UNIT/ML
500 SYRINGE INTRAVENOUS
Status: CANCELLED | OUTPATIENT
Start: 2023-04-25

## 2023-04-25 RX ADMIN — HEPARIN 500 UNITS: 100 SYRINGE at 09:04

## 2023-04-25 RX ADMIN — Medication 10 ML: at 09:04

## 2023-04-25 NOTE — TELEPHONE ENCOUNTER
Patient called requesting results of recent pet scan. Her images are in epic and the report is in .

## 2023-04-25 NOTE — PLAN OF CARE
Patient arrived to unit for maintenance port flush. Port was accessed, flushed with saline, blood return was noted, heparin locked and de-accessed.Needle intact. Tolerated well. Discharged off unit ambulating independently in no signs of acute distress.

## 2023-04-25 NOTE — PROGRESS NOTES
CARDIOVASCULAR CONSULTATION    REASON FOR CONSULT:   Ade Castellano is a 77 y.o. female who presents for preoperative risk assessment.      HISTORY OF PRESENT ILLNESS:     Patient is a pleasant 74-year-old lady.  Here for preoperative risk assessment prior to lymph node resection.  She has a past medical history significant for coronary artery disease status post PCI in 2006.  Since then has been angina free.  Had a stress test performed in October of 2018 which was normal.  Denies any anginal sounding chest pains, orthopnea, PND.  Does have mild dyspnea on exertion, which she states has actually been improving since her stress test in 2018.  Denies any other cardiac anginal symptoms.    Notes from July 2020:  Patient here for follow-up.  States that underwent radiation therapy for her lymph nodes.  Has been told that her cancer is gone now.  States that lately has been noticing severe dyspnea on exertion.  Can only walk 10 ft and then has to stop.  Also has dry cough all the time.  Not related to postural or activity.  Denies any chest tightness but does have severe dyspnea on exertion.  Denies any orthopnea, PND.      Notes from August 2020:  Patient here for follow-up.  Stress test was abnormal.      The study shows abnormal myocardial perfusion.    Abnormal myocardial perfusion study.    Perfusion Defect There is a mild to moderate intensity, small to moderate sized, mostly reversible with some fixed areas defect in the anteroapical wall(s).    Gated perfusion images showed an ejection fraction of 71%    There is normal wall motion at rest and post stress.    The EKG portion of this study is negative for ischemia.    The patient reported no chest pain during the stress test.    There were no arrhythmias during stress.    The diary was not returned.  NSR. Heart rates varied between 47 and 89 bpm with an average of 58 bpm.  Rare PACs / PVCs     Normal left ventricular systolic function. The estimated  ejection fraction is 60%.  Grade II (moderate) left ventricular diastolic dysfunction consistent with pseudonormalization.  Normal right ventricular systolic function.  Mild to moderate left atrial enlargement.  Trivial pericardial effusion.  The estimated PA systolic pressure is 25 mmHg.  Normal central venous pressure (3 mmHg).      Notes from aug 20, 2020  Patient here for follow-up.  Denies any chest pains at rest on exertion, orthopnea, PND, swelling of feet.  No complications from cardiac catheterization.      Notes from April 2020:  Patient here for follow-up.  Denies any chest pains at rest on exertion, orthopnea, PND.  EKG done in the clinic personally reviewed and shows sinus bradycardia with T-wave inversions in V1 and V2.  Unchanged from prior EKG.  Has been doing fine.        LVEDP (Pre): 18  Estimated blood loss: <50 mL  Non-obstructive CAD.  No significant coronary artery stenosis. Luminal irregularities. Previously placed RCA stent is widely patent.      Notes from December 2020 patient here for follow-up.  Has been doing fine.  Denies any chest pains at rest on exertion, orthopnea, PND, swelling of feet.  No cardiac symptomatology.    Notes from October 2021:  Patient here follow-up.  Doing fine.  Denies any chest pains at rest on exertion, orthopnea, PND.  EKG done today personally reviewed and showed sinus rhythm, nonspecific ST abnormalities.    Notes from December 2022: Patient here for follow-up after a long hiatus.  Denies any chest pains at rest on exertion, orthopnea, PND      Notes from April 23:  Patient here for follow-up.  Doing fine.  Denies any chest pains at rest on exertion, orthopnea, PND, swelling of feet    PAST MEDICAL HISTORY:     Past Medical History:   Diagnosis Date    Abnormal stress test 8/5/2020    Acute respiratory failure with hypoxia and hypercapnia 11/29/2017    Angina pectoris     Arthritis     Bell's palsy     left facial weakness    Breast cancer     RIGHT    CAD  (coronary artery disease)     Cervical cancer     Chronic bronchitis     Chronic heart failure with preserved ejection fraction 8/5/2020    Chronic heart failure with preserved ejection fraction 8/5/2020    COPD (chronic obstructive pulmonary disease)     Dr. Sterling Krueger bridge present     Emphysema of lung     H/O colonoscopy 06/2017    due for repeat colonsocopy in 6/2018    History of Bell's palsy     History of heart artery stent     Dr. Ortiz  x2 stents    Hyperlipidemia     Hypertension     Lung cancer     Myocardial infarction     SUNITHA (obstructive sleep apnea)     intolerant to mask    SUNITHA (obstructive sleep apnea)     intolerant to mask     Pneumonia     Pneumonia due to other staphylococcus     PUD (peptic ulcer disease)     Severe anemia 6/11/2018    Sleep apnea     Vaginal delivery     x1       PAST SURGICAL HISTORY:     Past Surgical History:   Procedure Laterality Date    ADENOIDECTOMY      BREAST BIOPSY Right     x3    BREAST LUMPECTOMY      BREAST SURGERY      lumpectomy right side     CERVIX SURGERY      cone    COLONOSCOPY N/A 3/17/2017    Procedure: COLONOSCOPY;  Surgeon: Julio Rudd MD;  Location: [facility] ENDO;  Service: Endoscopy;  Laterality: N/A;    COLONOSCOPY N/A 6/30/2017    Procedure: COLONOSCOPY;  Surgeon: Julio Rudd MD;  Location: [facility] ENDO;  Service: Endoscopy;  Laterality: N/A;    COLONOSCOPY N/A 11/28/2018    Procedure: COLONOSCOPY;  Surgeon: Nura Urbina MD;  Location: [facility] ENDO;  Service: Endoscopy;  Laterality: N/A;    COLONOSCOPY N/A 1/29/2019    Procedure: COLONOSCOPY;  Surgeon: Emmanuel Perez MD;  Location: [facility] ENDO;  Service: Endoscopy;  Laterality: N/A;  confirmed appt-SP    COLONOSCOPY N/A 7/26/2022    Procedure: COLONOSCOPY;  Surgeon: James Healy MD;  Location: [facility] ENDO;  Service: Endoscopy;  Laterality: N/A;  fully vaccinated -sm  mediport in chest -     CORONARY ANGIOPLASTY WITH STENT PLACEMENT      ESOPHAGOGASTRODUODENOSCOPY N/A 1/25/2021     Procedure: EGD (ESOPHAGOGASTRODUODENOSCOPY);  Surgeon: Dmitry Gonzalez MD;  Location: [facility] ENDO;  Service: Endoscopy;  Laterality: N/A;  rapid test >50 miles -ml    ESOPHAGOGASTRODUODENOSCOPY N/A 11/8/2022    Procedure: EGD (ESOPHAGOGASTRODUODENOSCOPY);  Surgeon: Tapan Stevenson MD;  Location: [facility] ENDO;  Service: Endoscopy;  Laterality: N/A;  w/dilation  fully vaccinated, instructions mailed-[facility]  11/4 pt called did not receive instructions, does not have portal or email, went over prep instructions and medication instructions with pt on the phone -LW    EYE SURGERY Bilateral 06/08/2018    cataract     LEFT HEART CATHETERIZATION Left 8/13/2020    Procedure: Left heart cath, rra, noon;  Surgeon: Guevara Skelton MD;  Location: [facility] CATH LAB;  Service: Cardiology;  Laterality: Left;  RN PREOP 8/7/2020---COVID NEGATIVE ON 8/12    PORTACATH PLACEMENT Right 01/2018    sweat glands axillary regions Bilateral     TONSILLECTOMY         ALLERGIES AND MEDICATION:   Review of patient's allergies indicates:  No Known Allergies     Medication List            Accurate as of April 25, 2023  8:54 AM. If you have any questions, ask your nurse or doctor.                CONTINUE taking these medications      acetaminophen 650 MG Tbsr  Commonly known as: TYLENOL     * albuterol 90 mcg/actuation inhaler  Commonly known as: VENTOLIN HFA  INHALE 2 PUFF(S) BY MOUTH EVERY 4 - 6 HOURS AS NEEDED FOR SHORTNESS OF BREATH or WHEEZING     * albuterol 2.5 mg /3 mL (0.083 %) nebulizer solution  Commonly known as: PROVENTIL  NEBULIZE 1 vial EVERY 6 HOURS AS NEEDED FOR WHEEZING     aspirin 81 MG EC tablet  Commonly known as: ECOTRIN     carboxymethylcellulose 0.5 % Dpet  Commonly known as: REFRESH PLUS     fluticasone propionate 50 mcg/actuation nasal spray  Commonly known as: FLONASE  USE TWO SPRAYS IN EACH NOSTRIL ONCE A DAY     isosorbide mononitrate 30 MG 24 hr tablet  Commonly known as: IMDUR  Take 1 tablet (30 mg total) by mouth once daily. Hold if  SBP <120     levocetirizine 5 MG tablet  Commonly known as: XYZAL  Take 1 tablet (5 mg total) by mouth every evening.     meclizine 25 mg tablet  Commonly known as: ANTIVERT  take 1 TABLET(S) BY MOUTH TWICE DAILY AS NEEDED for dizziness     metoprolol tartrate 25 MG tablet  Commonly known as: LOPRESSOR  TAKE 1 TABLET BY MOUTH TWICE A DAY FOR BLOOD PRESSURE     nitroGLYCERIN 0.4 MG SL tablet  Commonly known as: NITROSTAT  PLACE 1 TAB UNDER TONGUE EVERY 5 MINUTES x 3 DOSES AS NEEDED FOR CHEST PAIN. IF NOT RESOLVED CALL 911     pantoprazole 40 MG tablet  Commonly known as: PROTONIX  Take 1 tablet (40 mg total) by mouth once daily.     rosuvastatin 10 MG tablet  Commonly known as: CRESTOR  Take 1 tablet (10 mg total) by mouth once daily.     sodium chloride 0.65 % nasal spray  Commonly known as: OCEAN     TRELEGY ELLIPTA 100-62.5-25 mcg Dsdv  Generic drug: fluticasone-umeclidin-vilanter           * This list has 2 medication(s) that are the same as other medications prescribed for you. Read the directions carefully, and ask your doctor or other care provider to review them with you.                  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     Drug use: No    Sexual activity: Not Currently     Social Determinants of Health     Financial Resource Strain: Low Risk     Difficulty of Paying Living Expenses: Not hard at all   Food Insecurity: No Food Insecurity    Worried About Running Out of Food in the Last Year: Never true    Ran Out of Food in the Last Year: Never true   Transportation Needs: No Transportation Needs    Lack of Transportation (Medical):  No    Lack of Transportation (Non-Medical): No   Physical Activity: Inactive    Days of Exercise per Week: 0 days    Minutes of Exercise per Session: 0 min   Stress: No Stress Concern Present    Feeling of Stress : Not at all   Social Connections: Moderately Isolated    Frequency of Communication with Friends and Family: More than three times a week    Frequency of Social Gatherings with Friends and Family: Twice a week    Attends Faith Services: More than 4 times per year    Active Member of Clubs or Organizations: No    Attends Club or Organization Meetings: Never    Marital Status: Never    Housing Stability: Low Risk     Unable to Pay for Housing in the Last Year: No    Number of Places Lived in the Last Year: 1    Unstable Housing in the Last Year: No       FAMILY HISTORY:     Family History   Problem Relation Age of Onset    Cancer Mother         breast    Heart disease Mother     Breast cancer Mother     Cancer Father         lung-smoker     Cancer Sister         lung-smoker     Heart attack Sister     Cancer Maternal Grandmother     Heart disease Maternal Grandmother     Cancer Maternal Grandfather     Heart disease Maternal Grandfather     Cancer Paternal Grandmother     Cancer Paternal Grandfather     Cancer Sister         mets not sure where it started     COPD Sister     Heart disease Sister     Kidney cancer Son     Colon cancer Neg Hx     Esophageal cancer Neg Hx        REVIEW OF SYSTEMS:   Review of Systems   Constitutional: Positive for malaise/fatigue.   HENT: Negative.     Eyes: Negative.    Respiratory: Negative.     Endocrine: Negative.    Hematologic/Lymphatic: Negative.    Skin: Negative.    Musculoskeletal: Negative.    Gastrointestinal: Negative.    Genitourinary: Negative.    Neurological: Negative.    Psychiatric/Behavioral: Negative.     Allergic/Immunologic: Negative.      A 10 point review of systems was performed and all the pertinent positives have been mentioned. Rest of  "review of systems was negative.        PHYSICAL EXAM:     Vitals:    04/25/23 0814   BP: 122/78   Pulse: (!) 56   Resp: 15    Body mass index is 28.51 kg/m².  Weight: 73 kg (160 lb 15 oz)   Height: 5' 3" (160 cm)     Physical Exam  Constitutional:       Appearance: Normal appearance. She is well-developed.   HENT:      Head: Normocephalic.   Eyes:      Pupils: Pupils are equal, round, and reactive to light.   Cardiovascular:      Rate and Rhythm: Normal rate and regular rhythm.   Pulmonary:      Effort: Pulmonary effort is normal.      Breath sounds: Normal breath sounds.   Abdominal:      General: Bowel sounds are normal.      Palpations: Abdomen is soft.      Tenderness: There is no abdominal tenderness.   Musculoskeletal:         General: Normal range of motion.      Cervical back: Normal range of motion and neck supple.   Skin:     General: Skin is warm.   Neurological:      Mental Status: She is alert and oriented to person, place, and time.         DATA:     Laboratory:  CBC:  Recent Labs   Lab 12/20/22  0714 01/31/23  0708 04/03/23  0710   WBC 4.13 4.96 4.23   Hemoglobin 13.7 13.7 13.4   Hematocrit 42.5 41.9 41.1   Platelets 208 191 183         CHEMISTRIES:  Recent Labs   Lab 06/15/22  0707 07/06/22  0820 07/19/22  0731 08/22/22  0748 11/16/22  0703 12/20/22  0714 01/31/23  0708 04/03/23  0710   Glucose 121 H 114 H 129 H   < > 134 H 117 H 104 121 H   Sodium 141 136 140   < > 141 137 141 141   Potassium 3.3 L 3.6 4.3   < > 3.3 L 3.6 3.6 3.6   BUN 21 13 13   < > 16 23 15 17   Creatinine 1.2 0.9 0.9   < > 0.9 0.9 0.8 1.1   eGFR if  51 A >60 >60  --   --   --   --   --    eGFR if non  44 A >60 >60  --   --   --   --   --    Calcium 9.4 9.7 9.7   < > 9.5 9.5 9.0 9.2   Magnesium 1.6 1.4 L 1.5 L   < > 1.6  --  1.7 1.6    < > = values in this interval not displayed.         CARDIAC BIOMARKERS:  Recent Labs   Lab 04/06/22  0941   Troponin I 0.019         COAGS:  Recent Labs   Lab " 09/18/20  0925   INR 1.0         LIPIDS/LFTS:  Recent Labs   Lab 09/18/20  0925 10/12/20  0710 10/17/22  0920 11/16/22  0703 12/20/22  0714 01/31/23  0708 04/03/23  0710   Cholesterol 139  --  138  --   --   --   --    Triglycerides 191 H  --  113  --   --   --   --    HDL 51  --  60  --   --   --   --    LDL Cholesterol 49.8 L  --  55.4 L  --   --   --   --    Non-HDL Cholesterol 88  --  78  --   --   --   --    AST 24   < >  --    < > 16 17 19   ALT 17   < >  --    < > 14 19 19    < > = values in this interval not displayed.         Hemoglobin A1C   Date Value Ref Range Status   04/07/2022 5.9 (H) 4.0 - 5.6 % Final     Comment:     ADA Screening Guidelines:  5.7-6.4%  Consistent with prediabetes  >or=6.5%  Consistent with diabetes    High levels of fetal hemoglobin interfere with the HbA1C  assay. Heterozygous hemoglobin variants (HbS, HgC, etc)do  not significantly interfere with this assay.   However, presence of multiple variants may affect accuracy.     04/05/2021 6.0 (H) 4.0 - 5.6 % Final     Comment:     ADA Screening Guidelines:  5.7-6.4%  Consistent with prediabetes  >or=6.5%  Consistent with diabetes    High levels of fetal hemoglobin interfere with the HbA1C  assay. Heterozygous hemoglobin variants (HbS, HgC, etc)do  not significantly interfere with this assay.   However, presence of multiple variants may affect accuracy.     02/03/2020 6.1 (H) 4.0 - 5.6 % Final     Comment:     ADA Screening Guidelines:  5.7-6.4%  Consistent with prediabetes  >or=6.5%  Consistent with diabetes  High levels of fetal hemoglobin interfere with the HbA1C  assay. Heterozygous hemoglobin variants (HbS, HgC, etc)do  not significantly interfere with this assay.   However, presence of multiple variants may affect accuracy.         TSH  Recent Labs   Lab 09/26/22  0736 11/16/22  0703 01/31/23  0708   TSH 3.036 2.144 2.787         The ASCVD Risk score (Brandie DK, et al., 2019) failed to calculate for the following reasons:    The  patient has a prior MI or stroke diagnosis           Cardiovascular Testing:    EKG: (personally reviewed tracing)  Sinus bradycardia      ASSESSMENT AND PLAN     Patient Active Problem List   Diagnosis    Coronary artery disease of native artery of native heart with stable angina pectoris    Old myocardial infarction    Atherosclerosis of aorta    Prediabetes    DNR (do not resuscitate)    Centrilobular emphysema    Squamous cell carcinoma lung    Metastatic breast cancer    Hypertension    LOGAN (dyspnea on exertion)    Chronic heart failure with preserved ejection fraction    Dysphagia    Cancer of overlapping sites of lung    Secondary malignant neoplasm of unspecified site (CODE)    Dependence on supplemental oxygen    Hypokalemia    Generalized weakness    Chronic respiratory failure with hypoxia, on home O2 therapy    Immunodeficiency due to chemotherapy     Patient with a past medical history of coronary artery disease.  Past medical history significant for lung cancer.  No significant coronary artery disease on coronary angiogram had luminal irregularities.    Heart failure with preserved ejection fraction    Squamous cell carcinoma of lung    Breast cancer    Aortic arch atherosclerosis        Follow-up in Cardiology Clinic in 4 m            Thank you very much for involving me in the care of your patient.  Please do not hesitate to contact me if there are any questions.      Guevara Skelton MD, FACC, [facility]  Interventional Cardiologist, [facility].           This note was dictated with the help of speech recognition software.  There might be un-intended errors and/or substitutions.

## 2023-05-04 DIAGNOSIS — R42 VERTIGO: ICD-10-CM

## 2023-05-04 RX ORDER — MECLIZINE HYDROCHLORIDE 25 MG/1
TABLET ORAL
Qty: 30 TABLET | Refills: 2 | Status: SHIPPED | OUTPATIENT
Start: 2023-05-04

## 2023-05-04 NOTE — TELEPHONE ENCOUNTER
No care due was identified.  [facility] Embedded Care Due Messages. Reference number: [id number].   5/04/2023 12:57:49 PM CDT

## 2023-05-09 DIAGNOSIS — I20.89 STABLE ANGINA: Chronic | ICD-10-CM

## 2023-05-09 RX ORDER — ISOSORBIDE MONONITRATE 30 MG/1
30 TABLET, EXTENDED RELEASE ORAL DAILY
Qty: 30 TABLET | Refills: 11 | Status: SHIPPED | OUTPATIENT
Start: 2023-05-09

## 2023-05-09 RX ORDER — ISOSORBIDE MONONITRATE 30 MG/1
TABLET, EXTENDED RELEASE ORAL
Qty: 30 TABLET | Refills: 11 | Status: SHIPPED | OUTPATIENT
Start: 2023-05-09 | End: 2024-03-21

## 2023-05-09 NOTE — TELEPHONE ENCOUNTER
No care due was identified.  [facility] Embedded Care Due Messages. Reference number: [id number].   5/09/2023 9:13:04 AM CDT

## 2023-05-09 NOTE — TELEPHONE ENCOUNTER
----- Message from Wendy Ritchie sent at 5/9/2023  9:08 AM CDT -----  Regarding: Self/  [phone]  Type: RX Refill Request    Who Called:  Patient    Refill or New Rx:  Refill    RX Name and Strength:  isosorbide mononitrate (IMDUR) 30 MG 24 hr tablet    Preferred Pharmacy with phone number:  [facility] [address] [address]    Local or Mail Order:  Local    Ordering Provider:  GARCIA Huitron    Would the patient rather a call back or a response via [facility]?  Call back    Best Call Back Number:  [phone]

## 2023-05-17 ENCOUNTER — OFFICE VISIT (OUTPATIENT)
Dept: GASTROENTEROLOGY | Facility: CLINIC | Age: 78
End: 2023-05-17
Payer: MEDICARE

## 2023-05-17 ENCOUNTER — TELEPHONE (OUTPATIENT)
Dept: GASTROENTEROLOGY | Facility: CLINIC | Age: 78
End: 2023-05-17

## 2023-05-17 VITALS
HEART RATE: 54 BPM | SYSTOLIC BLOOD PRESSURE: 120 MMHG | WEIGHT: 163 LBS | BODY MASS INDEX: 28.88 KG/M2 | HEIGHT: 63 IN | DIASTOLIC BLOOD PRESSURE: 70 MMHG

## 2023-05-17 DIAGNOSIS — K21.00 GASTROESOPHAGEAL REFLUX DISEASE WITH ESOPHAGITIS, UNSPECIFIED WHETHER HEMORRHAGE: ICD-10-CM

## 2023-05-17 DIAGNOSIS — R05.3 CHRONIC COUGH: ICD-10-CM

## 2023-05-17 DIAGNOSIS — R13.10 DYSPHAGIA, UNSPECIFIED TYPE: Primary | ICD-10-CM

## 2023-05-17 PROCEDURE — 1157F PR ADVANCE CARE PLAN OR EQUIV PRESENT IN MEDICAL RECORD: ICD-10-PCS | Mod: CPTII,,, | Performed by: INTERNAL MEDICINE

## 2023-05-17 PROCEDURE — 99214 PR OFFICE/OUTPT VISIT, EST, LEVL IV, 30-39 MIN: ICD-10-PCS | Mod: ,,, | Performed by: INTERNAL MEDICINE

## 2023-05-17 PROCEDURE — 3288F FALL RISK ASSESSMENT DOCD: CPT | Mod: CPTII,,, | Performed by: INTERNAL MEDICINE

## 2023-05-17 PROCEDURE — 3078F DIAST BP <80 MM HG: CPT | Mod: CPTII,,, | Performed by: INTERNAL MEDICINE

## 2023-05-17 PROCEDURE — 99999 PR PBB SHADOW E&M-EST. PATIENT-LVL IV: ICD-10-PCS | Mod: PBBFAC,,, | Performed by: INTERNAL MEDICINE

## 2023-05-17 PROCEDURE — 1101F PR PT FALLS ASSESS DOC 0-1 FALLS W/OUT INJ PAST YR: ICD-10-PCS | Mod: CPTII,,, | Performed by: INTERNAL MEDICINE

## 2023-05-17 PROCEDURE — 1101F PT FALLS ASSESS-DOCD LE1/YR: CPT | Mod: CPTII,,, | Performed by: INTERNAL MEDICINE

## 2023-05-17 PROCEDURE — 3074F PR MOST RECENT SYSTOLIC BLOOD PRESSURE < 130 MM HG: ICD-10-PCS | Mod: CPTII,,, | Performed by: INTERNAL MEDICINE

## 2023-05-17 PROCEDURE — 1159F MED LIST DOCD IN RCRD: CPT | Mod: CPTII,,, | Performed by: INTERNAL MEDICINE

## 2023-05-17 PROCEDURE — 3288F PR FALLS RISK ASSESSMENT DOCUMENTED: ICD-10-PCS | Mod: CPTII,,, | Performed by: INTERNAL MEDICINE

## 2023-05-17 PROCEDURE — 3074F SYST BP LT 130 MM HG: CPT | Mod: CPTII,,, | Performed by: INTERNAL MEDICINE

## 2023-05-17 PROCEDURE — 3078F PR MOST RECENT DIASTOLIC BLOOD PRESSURE < 80 MM HG: ICD-10-PCS | Mod: CPTII,,, | Performed by: INTERNAL MEDICINE

## 2023-05-17 PROCEDURE — 99214 OFFICE O/P EST MOD 30 MIN: CPT | Mod: ,,, | Performed by: INTERNAL MEDICINE

## 2023-05-17 PROCEDURE — 1159F PR MEDICATION LIST DOCUMENTED IN MEDICAL RECORD: ICD-10-PCS | Mod: CPTII,,, | Performed by: INTERNAL MEDICINE

## 2023-05-17 PROCEDURE — 1126F PR PAIN SEVERITY QUANTIFIED, NO PAIN PRESENT: ICD-10-PCS | Mod: CPTII,,, | Performed by: INTERNAL MEDICINE

## 2023-05-17 PROCEDURE — 1157F ADVNC CARE PLAN IN RCRD: CPT | Mod: CPTII,,, | Performed by: INTERNAL MEDICINE

## 2023-05-17 PROCEDURE — 1126F AMNT PAIN NOTED NONE PRSNT: CPT | Mod: CPTII,,, | Performed by: INTERNAL MEDICINE

## 2023-05-17 PROCEDURE — 99999 PR PBB SHADOW E&M-EST. PATIENT-LVL IV: CPT | Mod: PBBFAC,,, | Performed by: INTERNAL MEDICINE

## 2023-05-17 RX ORDER — PANTOPRAZOLE SODIUM 40 MG/1
40 TABLET, DELAYED RELEASE ORAL DAILY
Qty: 90 TABLET | Refills: 3 | Status: SHIPPED | OUTPATIENT
Start: 2023-05-17 | End: 2024-05-16

## 2023-05-17 RX ORDER — HEPARIN 100 UNIT/ML
SYRINGE INTRAVENOUS
COMMUNITY
Start: 2023-04-03

## 2023-05-17 NOTE — PROGRESS NOTES
[facility]  Gastroenterology   Clinic Note              Patient Name: Ade Castellano  Age: 77 y.o.  Sex: female  MRN: [id number]    TODAY'S DATE:  5/17/2023  REFERRING PROVIDER:  No ref. provider found     Diagnosis:   1. Dysphagia, unspecified type    2. Chronic cough    3. Gastroesophageal reflux disease with esophagitis, unspecified whether hemorrhage        HPI:  Ade Castellano is a 77 y.o. female with a history of COPD, CAD, breast cancer '15, lung cancer s/p chemo/radiation who was referred for evaluation and treatment of dysphagia.    For review, patient has been seen by various GI providers in the past, most recently Dr. Healy in October of 2022, at which time she endorsed dysphagia to solids and liquids.  Plan for endoscopy was made, which was done in November with results as below.    Today patient confirms the above, noting that since her dilation her dysphagia has mildly improved, but remains present.  Her symptoms are particularly bad with rice and bread.    Interim history:  After last clinic appointment recommendation was made that given concern for traveling to [facility] for manometry, we could start with [facility].  She was seen in January with concern for oropharyngeal dysphagia, however SLP felt no treatment warranted.    Today, patient confirms being seen by SLP in January, without need for further follow-up.  She does endorse feeling competent in small bites, use of straws, chin tuck, and other strategies for oropharyngeal dysphagia.  Her biggest complaint is chronic cough, for which he has seen pulmonology in the past.  She denies use of PPI, although this does appear to be prescribed to her.  Denies the sensation of reflux.    Concerning dysphagia, she feels difficulty is mostly with initiating swallow, but also endorses occasional sensation of food stuck in chest, with regurgitation about once a week.  Denies weight loss or chest pain    PRIOR ENDOSCOPY:  -Colonoscopy:      07/26/22:    Impression:            - Five 3 to 4 mm polyps at the hepatic flexure and                          in the ascending colon, removed with a jumbo cold                          forceps. Resected and retrieved.                          - One 7 mm polyp in the descending colon, removed                          with a cold snare. Resected and retrieved.                          - One 4 mm polyp in the descending colon, removed                          with a cold snare. Resected and retrieved.                          - Non-bleeding external hemorrhoids.   Recommendation:        - Repeat colonoscopy is not recommended due to                          current age (66 years or older) for surveillance.                          - Patient has a contact number available for                          emergencies. The signs and symptoms of potential                          delayed complications were discussed with the                          patient. Return to normal activities tomorrow.                          Written discharge instructions were provided to                          the patient.                          - Discharge patient to home.                          - Await pathology results.     -EGD - 11/08/22:  Findings:        Corkscrew esophagus, tertiary contractions        Hypertonic LES, A guidewire was placed and the scope was withdrawn.        Dilation was performed with a Savary dilator with mild resistance at        48 Fr. The dilation site was examined following endoscope        reinsertion and showed no change.        Diffuse atrophic mucosa was found in the entire examined stomach.        Biopsies were taken with a cold forceps for histology.        The examined duodenum was normal.   Impression:            - Gastric mucosal atrophy. Biopsied.                          - Normal examined duodenum.   Recommendation:        - Patient has a contact number available for                           "emergencies. The signs and symptoms of potential                          delayed complications were discussed with the                          patient. Return to normal activities tomorrow.                          Written discharge instructions were provided to                          the patient.                          - Discharge patient to home (ambulatory).                          - Resume previous diet.                          - Continue present medications.                          - Await pathology results.                          - Return to GI clinic at appointment to be                          scheduled. the dysphagia may be due to motility                          disorder and additional workup may be helpful         ROS: Negative other than above    Vital Signs:  /70   Pulse (!) 54   Ht 5' 3" (1.6 m)   Wt 73.9 kg (163 lb 0.5 oz)   LMP  (LMP Unknown)   BMI 28.88 kg/m²      General: Awoke and orientedx3, in no acute distress  HEENT: Normocephalic, atruamatic, Moist mucous membranes  CV: Regular rate and rhythm, no JVD  Pulm: Normal inspiratory effort, no audible wheezing  Abdomen: Soft, nontender, nondistended abdomen without rebound or guarding  Extremities: No clubbing, cyanosis or edema  Psych: Normal affect. Good eye contact     Labs:   No results found for: CRP, CALPROTECTIN  Lab Results   Component Value Date    HEPBSAG Negative 10/22/2014     No results found for: TBGOLDPLUS  Lab Results   Component Value Date    SYMYULIF66TN 38 11/30/2015     Lab Results   Component Value Date    WBC 4.23 04/03/2023    HGB 13.4 04/03/2023    HCT 41.1 04/03/2023    MCV 92 04/03/2023     04/03/2023     Lab Results   Component Value Date    CREATININE 1.1 04/03/2023    ALBUMIN 3.9 04/03/2023    BILITOT 0.5 04/03/2023    ALKPHOS 60 04/03/2023    AST 19 04/03/2023    ALT 19 04/03/2023       Assessment/Plan:  Ade Castellano is a 77 y.o. female with a history of COPD, CAD s/p PCI '06, breast " cancer '15, lung cancer s/p chemo/radiation who was referred for evaluation and treatment of dysphagia.    # Dysphagia, unspecified type [R13.10]    As with prior visits, we discussed that with endoscopy and symptoms concerning for achalasia, manometry would be the next recommended step, especially given recent SLP with only mild oropharyngeal dysphagia which likely does not completely explain her symptoms.  Her Eckardt is low at 3, but I did again emphasize the importance of manometry for further evaluation.      As a compromise, we have agreed to begin Protonix 40 mg daily for the next month, and schedule manometry for 1 month from now.  If symptoms do not improve she will proceed with manometry    -- Continue swallow modification for oropharyngeal dysphagia, with small bites, use of straw, and chin tuck  -- Begin protonix 40mg daily  -- Manometry in 1-2 mo if symptoms have not improved.    RTC 3 mo    Thank you for involving us in the care of this patient.        Miller Phillips MD  Department of Gastroenterology & Hepatology

## 2023-05-19 ENCOUNTER — TELEPHONE (OUTPATIENT)
Dept: HEMATOLOGY/ONCOLOGY | Facility: CLINIC | Age: 78
End: 2023-05-19
Payer: MEDICARE

## 2023-05-19 DIAGNOSIS — R53.1 GENERALIZED WEAKNESS: Primary | ICD-10-CM

## 2023-05-19 DIAGNOSIS — R82.90 ABNORMAL URINALYSIS: ICD-10-CM

## 2023-05-19 DIAGNOSIS — C50.919 RECURRENT BREAST CANCER, UNSPECIFIED LATERALITY: Primary | ICD-10-CM

## 2023-05-19 DIAGNOSIS — Z85.118 HISTORY OF LUNG CANCER: ICD-10-CM

## 2023-05-19 NOTE — TELEPHONE ENCOUNTER
Tc received from pt stating she was hot and cold all night and does not feel well She did not take her temperature States she feels dehydrated and some weak  Nurse advised her to see her PCP today or ER Since she lives in [place] she will go to clinic there Informed her that if they need any information fron our office to contact office and to advise us of their findings   She agreed

## 2023-05-30 ENCOUNTER — TELEPHONE (OUTPATIENT)
Dept: GASTROENTEROLOGY | Facility: CLINIC | Age: 78
End: 2023-05-30
Payer: MEDICARE

## 2023-05-30 NOTE — TELEPHONE ENCOUNTER
----- Message from Sofiya Toni sent at 5/30/2023  1:20 PM CDT -----  .Type: Patient Call Back    Who called:self     What is the request in detail: patient she was told to call when she was ready to schedule procedure and she wants to schedule now    Can the clinic reply by [facility]? call    Would the patient rather a call back or a response via [facility]? call  Best call back number: .[phone]     Additional Information:

## 2023-05-31 ENCOUNTER — TELEPHONE (OUTPATIENT)
Dept: ENDOSCOPY | Facility: HOSPITAL | Age: 78
End: 2023-05-31
Payer: MEDICARE

## 2023-05-31 VITALS — WEIGHT: 163 LBS | BODY MASS INDEX: 28.88 KG/M2 | HEIGHT: 63 IN

## 2023-05-31 DIAGNOSIS — R13.10 DYSPHAGIA, UNSPECIFIED TYPE: Primary | ICD-10-CM

## 2023-05-31 NOTE — TELEPHONE ENCOUNTER
"Miller Phillips MD  [facility] Patients  Caller: Unspecified (2 weeks ago)  Procedure: manometry     Diagnosis: Dysphagia     Procedure Timin-8 weeks     *If within 4 weeks selected, please suman as high priority*     *If greater than 12 weeks, please select "5-12 weeks" and delay sending until 2 months prior to requested date*     Provider: Myself     Location: [address]     Additional Scheduling Information: No scheduling concerns     Prep Specifications:N/A     Have you attached a patient to this message: yes   "

## 2023-05-31 NOTE — TELEPHONE ENCOUNTER
Esophageal Manometry (Motility) with Impedance Instructions    Date of procedure: 7/25/23 Arrive at: 9:00 AM    Location of Department:   [facility] [place] [address]  Take the [facility] Elevators to 4th Floor Endoscopy Lab      How to prep:      Day Before Procedure 7/24/23    You may have a light evening meal.   No solid food after 7:00 pm.   You may have clear liquids until midnight. See below for list.       Day of the Procedure 7/25/23    If your appointment is in the afternoon, you may have clear liquids until  N/A.       What You CANNOT do:   Do not drink milk or anything colored red.  Do not drink alcohol.  No gum chewing or candy morning of procedure.    Liquids That Are OK to Drink:   Water  Sports drinks (Gatorade, Power-Aid)  Coffee or tea (no cream or nondairy creamer)  Clear juices without pulp (apple, white grape)  Gelatin desserts (no fruit or toppings)  Clear soda (sprite, coke, ginger ale)  Chicken broth (until 12 midnight the night before procedure)      Comments:                IMPORTANT INFORMATION TO KNOW BEFORE YOUR PROCEDURE    [facility] 4th Floor    If your procedure requires the administration of anesthesia, it is necessary for a responsible adult to drive you home. (Medical Transportation, Uber, Lyft, Taxi, etc. may ONLY be used if a responsible adult is present to accompany you home.  The responsible adult CAN'T be the  of the service).      person must be available to return to pick you up within 30 minutes of being notified of discharge.     Due to the limited socially distant seating in our waiting room, please limit your guest (1) who accompany you for this procedure. If someone accompanies you for this procedure into the facility:    Consider having them proceed to an area that is socially distant other than the lobby until a member of the medical team contacts them to provide an update after the procedure.      Also, please consider being dropped off and picked up from the facility.      Please bring a picture ID, insurance card, & copayment    Take Medications as directed below:      If you begin taking any blood thinning medications, please contact the  listed below as soon as possible.     If you are diabetic see the attached instruction sheet regarding your medication.     If you take HEART, BLOOD PRESSURE, SEIZURE, PAIN, LUNG (including inhalers/nebulizers), ANTI-REJECTION (transplant patients), or PSYCHIATRIC medications, please take at your regular times with a sip of water or as directed by the scheduling nurse.     Important contact information:    Endoscopy [phone] Hours of operation Monday-Friday 8:00-4:30pm.    Questions about insurance or financial obligations call [phone] or [phone].    If you have questions regarding the prep or need to reschedule, please call [phone]. After hours questions requiring immediate assistance, contact [facility] nurse line at [phone] or [phone].   NOTE:     On occasion, unforeseen circumstances may cause a delay in your procedure start time. We respect your time and appreciate your patience during these circumstances.      Comments:

## 2023-06-07 ENCOUNTER — OFFICE VISIT (OUTPATIENT)
Dept: HEMATOLOGY/ONCOLOGY | Facility: CLINIC | Age: 78
End: 2023-06-07
Payer: MEDICARE

## 2023-06-07 ENCOUNTER — HOSPITAL ENCOUNTER (OUTPATIENT)
Dept: CARDIOLOGY | Facility: HOSPITAL | Age: 78
Discharge: HOME | End: 2023-06-07
Attending: INTERNAL MEDICINE
Payer: MEDICARE

## 2023-06-07 ENCOUNTER — INFUSION (OUTPATIENT)
Dept: INFUSION THERAPY | Facility: HOSPITAL | Age: 78
End: 2023-06-07
Attending: INTERNAL MEDICINE
Payer: MEDICARE

## 2023-06-07 VITALS
DIASTOLIC BLOOD PRESSURE: 85 MMHG | OXYGEN SATURATION: 97 % | RESPIRATION RATE: 18 BRPM | SYSTOLIC BLOOD PRESSURE: 134 MMHG | OXYGEN SATURATION: 97 % | WEIGHT: 157.88 LBS | DIASTOLIC BLOOD PRESSURE: 65 MMHG | TEMPERATURE: 98 F | BODY MASS INDEX: 27.97 KG/M2 | HEIGHT: 63 IN | HEART RATE: 48 BPM | SYSTOLIC BLOOD PRESSURE: 155 MMHG | HEART RATE: 52 BPM

## 2023-06-07 DIAGNOSIS — J44.9 CHRONIC OBSTRUCTIVE PULMONARY DISEASE, UNSPECIFIED COPD TYPE: ICD-10-CM

## 2023-06-07 DIAGNOSIS — Z85.118 HISTORY OF LUNG CANCER: ICD-10-CM

## 2023-06-07 DIAGNOSIS — C50.919 RECURRENT BREAST CANCER, UNSPECIFIED LATERALITY: Primary | ICD-10-CM

## 2023-06-07 DIAGNOSIS — C34.90 SQUAMOUS CELL CARCINOMA LUNG: Primary | ICD-10-CM

## 2023-06-07 DIAGNOSIS — R13.10 DYSPHAGIA, UNSPECIFIED TYPE: ICD-10-CM

## 2023-06-07 DIAGNOSIS — R01.1 MURMUR: ICD-10-CM

## 2023-06-07 LAB
ASCENDING AORTA: 2.93 CM
AV INDEX (PROSTH): 0.35
AV MEAN GRADIENT: 12 MMHG
AV PEAK GRADIENT: 22 MMHG
AV VALVE AREA: 1.08 CM2
AV VELOCITY RATIO: 0.31
BSA FOR ECHO PROCEDURE: 1.78 M2
CV ECHO LV RWT: 0.41 CM
DOP CALC AO PEAK VEL: 2.32 M/S
DOP CALC AO VTI: 58.9 CM
DOP CALC LVOT AREA: 3.1 CM2
DOP CALC LVOT DIAMETER: 1.99 CM
DOP CALC LVOT PEAK VEL: 0.73 M/S
DOP CALC LVOT STROKE VOLUME: 63.73 CM3
DOP CALC MV VTI: 45.4 CM
DOP CALCLVOT PEAK VEL VTI: 20.5 CM
E WAVE DECELERATION TIME: 210.67 MSEC
E/A RATIO: 2.93
E/E' RATIO: 21 M/S
ECHO LV POSTERIOR WALL: 0.96 CM (ref 0.6–1.1)
EJECTION FRACTION: 55 %
FRACTIONAL SHORTENING: 25 % (ref 28–44)
INTERVENTRICULAR SEPTUM: 0.92 CM (ref 0.6–1.1)
IVC DIAMETER: 1.59 CM
IVRT: 108.47 MSEC
LA MAJOR: 4.94 CM
LA MINOR: 4.91 CM
LA WIDTH: 4.1 CM
LEFT ATRIUM SIZE: 4.08 CM
LEFT ATRIUM VOLUME INDEX: 40 ML/M2
LEFT ATRIUM VOLUME: 70.03 CM3
LEFT INTERNAL DIMENSION IN SYSTOLE: 3.49 CM (ref 2.1–4)
LEFT VENTRICLE DIASTOLIC VOLUME INDEX: 46.47 ML/M2
LEFT VENTRICLE DIASTOLIC VOLUME: 81.32 ML
LEFT VENTRICLE MASS INDEX: 85 G/M2
LEFT VENTRICLE SYSTOLIC VOLUME INDEX: 12 ML/M2
LEFT VENTRICLE SYSTOLIC VOLUME: 21.06 ML
LEFT VENTRICULAR INTERNAL DIMENSION IN DIASTOLE: 4.64 CM (ref 3.5–6)
LEFT VENTRICULAR MASS: 148.09 G
LV LATERAL E/E' RATIO: 21 M/S
LV SEPTAL E/E' RATIO: 21 M/S
LVOT MG: 1.14 MMHG
LVOT MV: 0.5 CM/S
MV MEAN GRADIENT: 2 MMHG
MV PEAK A VEL: 0.43 M/S
MV PEAK E VEL: 1.26 M/S
MV PEAK GRADIENT: 7 MMHG
MV STENOSIS PRESSURE HALF TIME: 61.1 MS
MV VALVE AREA BY CONTINUITY EQUATION: 1.4 CM2
MV VALVE AREA P 1/2 METHOD: 3.6 CM2
OHS LV EJECTION FRACTION SIMPSONS BIPLANE MOD: 7 %
PISA TR MAX VEL: 3.03 M/S
PV PEAK VELOCITY: 0.97 CM/S
RA MAJOR: 5.21 CM
RA PRESSURE: 3 MMHG
RA WIDTH: 3.6 CM
RIGHT VENTRICULAR END-DIASTOLIC DIMENSION: 3.84 CM
RV TISSUE DOPPLER FREE WALL SYSTOLIC VELOCITY 1 (APICAL 4 CHAMBER VIEW): 0.01 CM/S
SINUS: 2.65 CM
STJ: 1.97 CM
TDI LATERAL: 0.06 M/S
TDI SEPTAL: 0.06 M/S
TDI: 0.06 M/S
TR MAX PG: 37 MMHG
TRICUSPID ANNULAR PLANE SYSTOLIC EXCURSION: 2.05 CM
TV REST PULMONARY ARTERY PRESSURE: 40 MMHG

## 2023-06-07 PROCEDURE — 3079F PR MOST RECENT DIASTOLIC BLOOD PRESSURE 80-89 MM HG: ICD-10-PCS | Mod: CPTII,S$GLB,, | Performed by: INTERNAL MEDICINE

## 2023-06-07 PROCEDURE — 99214 OFFICE O/P EST MOD 30 MIN: CPT | Mod: S$GLB,,, | Performed by: INTERNAL MEDICINE

## 2023-06-07 PROCEDURE — 1126F AMNT PAIN NOTED NONE PRSNT: CPT | Mod: CPTII,S$GLB,, | Performed by: INTERNAL MEDICINE

## 2023-06-07 PROCEDURE — 1157F ADVNC CARE PLAN IN RCRD: CPT | Mod: CPTII,S$GLB,, | Performed by: INTERNAL MEDICINE

## 2023-06-07 PROCEDURE — 1126F PR PAIN SEVERITY QUANTIFIED, NO PAIN PRESENT: ICD-10-PCS | Mod: CPTII,S$GLB,, | Performed by: INTERNAL MEDICINE

## 2023-06-07 PROCEDURE — 3075F PR MOST RECENT SYSTOLIC BLOOD PRESS GE 130-139MM HG: ICD-10-PCS | Mod: CPTII,S$GLB,, | Performed by: INTERNAL MEDICINE

## 2023-06-07 PROCEDURE — 93306 TTE W/DOPPLER COMPLETE: CPT | Mod: 26,,, | Performed by: INTERNAL MEDICINE

## 2023-06-07 PROCEDURE — 96523 IRRIG DRUG DELIVERY DEVICE: CPT

## 2023-06-07 PROCEDURE — 1101F PR PT FALLS ASSESS DOC 0-1 FALLS W/OUT INJ PAST YR: ICD-10-PCS | Mod: CPTII,S$GLB,, | Performed by: INTERNAL MEDICINE

## 2023-06-07 PROCEDURE — 3079F DIAST BP 80-89 MM HG: CPT | Mod: CPTII,S$GLB,, | Performed by: INTERNAL MEDICINE

## 2023-06-07 PROCEDURE — 99999 PR PBB SHADOW E&M-EST. PATIENT-LVL IV: CPT | Mod: PBBFAC,,, | Performed by: INTERNAL MEDICINE

## 2023-06-07 PROCEDURE — 3288F FALL RISK ASSESSMENT DOCD: CPT | Mod: CPTII,S$GLB,, | Performed by: INTERNAL MEDICINE

## 2023-06-07 PROCEDURE — 93306 TTE W/DOPPLER COMPLETE: CPT

## 2023-06-07 PROCEDURE — 3075F SYST BP GE 130 - 139MM HG: CPT | Mod: CPTII,S$GLB,, | Performed by: INTERNAL MEDICINE

## 2023-06-07 PROCEDURE — 63600175 PHARM REV CODE 636 W HCPCS: Performed by: INTERNAL MEDICINE

## 2023-06-07 PROCEDURE — 99999 PR PBB SHADOW E&M-EST. PATIENT-LVL IV: ICD-10-PCS | Mod: PBBFAC,,, | Performed by: INTERNAL MEDICINE

## 2023-06-07 PROCEDURE — 1101F PT FALLS ASSESS-DOCD LE1/YR: CPT | Mod: CPTII,S$GLB,, | Performed by: INTERNAL MEDICINE

## 2023-06-07 PROCEDURE — 1157F PR ADVANCE CARE PLAN OR EQUIV PRESENT IN MEDICAL RECORD: ICD-10-PCS | Mod: CPTII,S$GLB,, | Performed by: INTERNAL MEDICINE

## 2023-06-07 PROCEDURE — 3288F PR FALLS RISK ASSESSMENT DOCUMENTED: ICD-10-PCS | Mod: CPTII,S$GLB,, | Performed by: INTERNAL MEDICINE

## 2023-06-07 PROCEDURE — 93306 ECHO (CUPID ONLY): ICD-10-PCS | Mod: 26,,, | Performed by: INTERNAL MEDICINE

## 2023-06-07 PROCEDURE — 99214 PR OFFICE/OUTPT VISIT, EST, LEVL IV, 30-39 MIN: ICD-10-PCS | Mod: S$GLB,,, | Performed by: INTERNAL MEDICINE

## 2023-06-07 RX ORDER — SODIUM CHLORIDE 0.9 % (FLUSH) 0.9 %
10 SYRINGE (ML) INJECTION
Status: CANCELLED | OUTPATIENT
Start: 2023-06-07

## 2023-06-07 RX ORDER — HEPARIN 100 UNIT/ML
500 SYRINGE INTRAVENOUS
Status: DISCONTINUED | OUTPATIENT
Start: 2023-06-07 | End: 2023-06-07 | Stop reason: HOSPADM

## 2023-06-07 RX ORDER — HEPARIN 100 UNIT/ML
500 SYRINGE INTRAVENOUS
Status: CANCELLED | OUTPATIENT
Start: 2023-06-07

## 2023-06-07 RX ORDER — SODIUM CHLORIDE 0.9 % (FLUSH) 0.9 %
10 SYRINGE (ML) INJECTION
Status: DISCONTINUED | OUTPATIENT
Start: 2023-06-07 | End: 2023-06-07 | Stop reason: HOSPADM

## 2023-06-07 RX ADMIN — HEPARIN 500 UNITS: 100 SYRINGE at 09:06

## 2023-06-07 NOTE — PLAN OF CARE
Patient arrived to clinic for port flush. Port assessed, flushed,and flushed with heparin. Tolerated well. Request appt date change for next port flush in August, msg given to . Ambulated off unit in [facility].

## 2023-06-07 NOTE — PROGRESS NOTES
Subjective:       Patient ID: Ade Castellano is a 77 y.o. female.    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 The PET(+) disease in the right axilla and right supraclavicular fossa will be boosted at 200 cGy per fraction to 6000 cGy total dose.  S/p LYMPH NODE, RIGHT AXILLA, BIOPSY: 6/16/21 . Metastatic poorly-differentiated carcinoma, c/w breast primary ERnegPRneg Her2 neg  PD-L1 (22C3) IHC CPS> is greater than or equal to 10  Pembrolizumab 7/22/21 -6/15/22         Prior Hx:  76 y/o female with history of  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  And Rt  breast Haja/p  cycle 6 of carboplatin/Abraxane 7/2/2018   She has  History of Stage 1A  Rt breast cancer Grade 3, ER/ME neg , Her 2 jose maria neg s/p lumpectomy 7/11/2014 and s/p adjuvant RT 9/2014 Pt declined Adjuvant chemo.Pathology showed a 1.15 cm, grade 3 infiltrating ductal carcinoma.  One sentinel lymph node was negative for malignancy.  Margins were negative and the closest margin was 1 cm.  She was staged  pT1c  pN0 cM0 stage IA.  She declined adjuvant chemo therapy.  She completed post operative radiation therapy at 400 cGy per fraction to 4000 cGy 9/2014  Pt hospitalized 11/2017 for  acute on chronic respiratory failure requiring intubation and ventilation. Has large lung mass in right lung with probable post obstructive pneumonia resulting in COPD exacerbation.CTA  chest 11/29/2017 revealed   Large right hilar mass with involvement of adjacent segmental pulmonary arterial branches and bronchi with associated postobstructive atelectasis and volume loss in the RML  with adjacent ground glass opacity concerning for underlying neoplastic process. Mediastinal and axillary adenopathy, with a right axillary lymph node measuring up to 2.0 cm in short axis diameter.She underwent rt axillary LN bx at outside facility- on 1/16/2018 at [facility]. Pathology revealed metastatic poorly differentiated carcinoma of unknown primary site. She next underwent lung bx at [facility] 1/31/2018  benign lung tissue. Repeat Right Lung Bx 2/14/2018 Pathology reveals Squamous cell carcinoma PD-L1 10% low expression EGFR NEG ALK NEG BRAF NEG. Outside slides axillary LN specimen were reviewed/comparison to lung bx findings . She completed    cycle 6 of carboplatin/Abraxane completed 7/2018 .PET/CT  4/19/2018 revealed there has been a excellent response to therapy.  At least 90% reduction in malignant activity.PET/CT 2/21/2019 increased size and irregularity of the right axillary lymph node with increased FDG avidityMAMMO/US rt breast 2/2019 revealed suspicious findings rt axilla LN.  Right axilla, mass, core biopsy: Positive for poorly differentiated carcinoma breast primary ER neg/HI neg Her2 neg.[facility] for outside reviewIt was determined  the lung tumor corresponds to a squamous cellcarcinoma and appears to be unrelated to the invasive ductal carcinoma of the breast. The axillary mass, in myopinion, corresponds to metastases of the breast primary. Although it is a poorly differentiated carcinoma, theimmunophenotype is most consistent with the one that the breast primary exhibited, and is inconsistent with metastatic squamous cell carcinoma. She was treated with  AC ( adriamycin 60m/m2/Cytoxan 600mg/m2)  q21d x 4 cycles completed 9/6/2019 . PET/CT 9/19/2019 shows disease response l decrease in  "size and uptake of several right axillary lymph nodes and a supraclavicular lymph node.  Mild residual activity may indicate viable tumor.Pt followed by Rad/Onc. She was presented at [facility] tumor board and it was determined to proceed Radiation therapy only. No ALND due to concurrent lung and supraclavicular node. She  completed  RT 12/16/2019  To right axilla and right supraclavicular region PET/CT imaging  5/20/21 shows Continued increase in both size and hypermetabolic activity of right axillary level 1 lymph nodes a new, mildly hypermetabolic cutaneous thickening of the right breast. she underwent LYMPH NODE, RIGHT AXILLA, BIOPSY: 6/16/21 . Pathology showed Metastatic poorly-differentiated carcinoma, consistent with breast primary-ERneg/ PRneg  HER2  neg  Pt started on pembrolizumab 7/2021  Pt hospitalized 4/6/22 with hypokalemia and orthostatic hypotension  S/p C16 pembrolizumab 6/15/22  ( 400mg q6wks)      Interval   She was recently treated at  [facility]  for a " kidney infxn"   She completed course of abx last week  No  dysuria, hematuria,   She reports she had fevers and chills  She continues with mild LOGAN  She is f/b pulmonology, Dr. Katz  She reports she was told worsening SOB is 2/2 GI issues  She [place]  [place] [facility] [place]  She continues with difficulty swallowing sometimes  She reports food " gets stuck" \   She has undergone dilation in past  Appetite and weight stable  She has supp 02 at home       PET /CT 4/14/23 No opacity in the left lower lobe on the current exam to correspond to a left lower lobe opacity described on the 7/13/2022 PET CT report.  Images from the 7/13/2022 PET CT are not available at the time of this report.       Cutaneous thickening in the right breast with mild uptake.  There was a similar finding described on the 71/3/2022 PET CT report.              Last Mammo 7/26/22 BI-RADS Category:   Overall: 2 - Benign             PrevHx:She had an " "abnormal mammogram 6/4/2014 whichRevealed a round solid mass 6mm in rt breast.She then underwent U/S guided core bx of rt breast mass on 6/17/2014.Pathology revealed infiltrating ductal carcinoma Grade 3 with tumorPresent in thin-walled spaces suggestive of lymphatic spaces. HormoneReceptor status on tumor specimen revealed ER negative 0% ,HI negative 0% and Her 2 jose maria negative.She subsequently underwent rt segmental mastectomy and SLN bxOn 7/11/2014. Pathology of rt breast lumpectomy revealed invasiveDuctal carcinoma with micropapillary pattern ( Invasive micropapillaryCa) with max tumor dimension 11.5mm with suggestion of tumor in Thin walled spaces c/w lymphovascular involvement. SurgicalMargins free of tumor, grade 3, ER/HI neg , Her 2 jose maria neg With [place] Lymph node negative for Neoplasm. Pathologic staging hA2diB2(i-)Her mother was diagnosed with breast cancer in her 50's/        Review of Systems   Constitutional: Positive for mild  fatigue. No  activity change,  fever.   HENT: Positive for dysphagia. Negative for mouth sores, rhinorrhea.   Eyes: Negative for visual disturbance.   Respiratory: Positive for  cough ( improved)Positive for chronic  LOGAN Negative for wheezing.    Cardiovascular: Negative for chest pain.   Gastrointestinal:  Negative for abdominal pain and diarrhea.   Genitourinary: Negative for frequency.   Musculoskeletal: Negative for back pain.   Skin: Negative for rash.   Neurological: Negative for dizziness and headaches.   Hematological: Negative for adenopathy.   Psychiatric/Behavioral: The patient is not nervous/anxious.        Objective:        Vitals:    06/07/23 0805   BP: 134/85   BP Location: Left arm   Patient Position: Sitting   BP Method: Large (Automatic)   Pulse: (!) 52   SpO2: 97%   Weight: 71.6 kg (157 lb 13.6 oz)   Height: 5' 3" (1.6 m)         Vitals:    06/07/23 0805   BP: 134/85   BP Location: Left arm   Patient Position: Sitting   BP Method: Large (Automatic)   Pulse: (!) " "52   SpO2: 97%   Weight: 71.6 kg (157 lb 13.6 oz)   Height: 5' 3" (1.6 m)               Physical Exam   Constitutional: She is oriented to person, place, and time. She appears ill, coughing episodes  HENT:   Head: Normocephalic.   Eyes: Conjunctivae and lids are normal.No scleral icterus.   Neck: Normal range of motion. Neck supple. No thyromegaly present.   Cardiovascular: Normal rate, regular rhythm and normal heart sounds.   murmur heard.  Pulmonary/Chest: Breath sounds normal. She has no wheezes. She has no rales.   Abdominal: Soft. Bowel sounds are normal.  There is no tenderness. There is no rebound and no guarding.   Left breast- no masses or axillary LAD noted  Right breast- breast thickening inner quad    She has no cervical adenopathy.     She has rt axillary adenopathy.        Right: No supraclavicular adenopathy present.        Left: No supraclavicular adenopathy present.   Neurological: She is alert and oriented to person, place, and time. No cranial nerve deficit. Coordination normal.   Skin: Skin is warm and dry. No ecchymosis, no petechiae and no rash noted. No erythema.   Psychiatric: She has a normal mood and affect.             CT a/p w/contrast 11/29/2018   1. No acute abnormality identified within the abdomen and pelvis.    2.  There are a few nonspecific prominent lymph nodes in the upper abdomen including a periesophageal lymph node which measures 1.0 cm in short axis diameter.    3.  Significant abdominal aortic atherosclerosis and abdominal aorta ectasia.    4.  Additional findings as above.    PET/CT 1/26/2018   1.  Intense FDG uptake within the known right infrahilar lung mass, compatible with malignancy.  It is unclear if this represents primary lung malignancy or metastatic breast cancer.    2.  Intensely hypermetabolic right axillary, retropectoral, and lower right cervical lymph nodes, compatible with metastases.    3.  Abnormal FDG uptake along right breast skin thickening, new from " prior chest CTA and mammogram.  This may relate to localized edema/inflammation, though correlation with physical exam and mammography are recommended to exclude underlying inflammatory carcinoma.      MRI Brain w w/out contrast 2/9/2018  1.  No evidence of intracranial metastases.  2.  Sinus disease       Rt axillary LN bx at outside facility- on 1/16/2018 at [facility]  Pathology revealed metastatic poorly differentiated carcinoma of unknown primary  site 1/16/2018 at [facility]  TTF-1 negative, napsin negative  , cytokeratin 7 positive, cytokeratin 20 negative, p63 negative, cytokeratin 5/6 focal dim positivity    LUNG BIOPSY 1/31/2018  Pathology revealed benign lung tissue         SPECIMEN  1) Right Lung Bx 2/14/2018  Supplemental Diagnosis  Immunohistochemical stains show strong nuclear staining for p63 in essentially all tumor cells and very strong  cytoplasmic and membrane staining for CK5/6, also in essentially all tumor cells. TTF-1 and CK7 are negative  within tumor cells but do stain native pulmonary elements present within the biopsy. A stain for mucicarmine is  negative. Positive and negative controls function appropriately.  Final diagnosis: Specimen submitted as right lung biopsy  -Squamous cell carcinoma  (Electronically Signed: 2018-02-20 09:12:07 )  Diagnosed by: Phong Thompson  FINAL PATHOLOGIC DIAGNOSIS  Fragments of pulmonary parenchyma (submitted as right lung biopsy):    FINAL PATHOLOGIC DIAGNOSIS 1. Lymph node, right axilla, biopsy, review of 10 outside slides [facility], JS 18 1 [phone],  collected January 11, 2018: Metastatic poorly differentiated carcinoma (see comment).  The histologic section shows fibrous stroma infiltrated by nest of poorly differentiated malignant cells including  occasional dyskeratotic cells morphologically suggestive of metastatic squamous cell carcinoma.  Immunohistochemical stains are nonspecific; the cells are positive  for cytokeratin 7 and cytokeratin 5/6 (focal) and  negative for cytokeratin 20, TTF-1, p63, GCDFP, mammoglobin, and CEA        PET/CT 2/21/2019  Increased size and irregularity of the right axillary lymph node with increased FDG avidity.  Although this would be atypical in location for lung carcinoma, the increased size and avidity must be concerning for metastatic disease in this patient with history of squamous cell lung cancer.     US Rt breast and mammo 2/26/2019  Impression:  Right  Lymph Node: Right axilla lymph node. Assessment: 4 - Suspicious finding. Biopsy is recommended.      BI-RADS Category:   Right: 4 - Suspicious  Overall: 4 - Suspicious     Recommendation:  Biopsy is recommended. Biopsy of the lymph node measuring 1.4 x 1.1 x 1.3 recommended , the one with the round shape configuration, corresponding to the most recent PET CT finding.         Pathology 3/11/2019   FINAL PATHOLOGIC DIAGNOSIS  Right axilla, mass, core biopsy:  Positive for poorly differentiated carcinoma.  See comment.  Comment:  The biopsy from the right axilla mass shows a poorly differentiated carcinoma in background lymphoid tissue  with enlarged cells showing increased nuclear size, prominent nucleoli, moderate amounts of cytoplasm and mitotic  figures. Immunostains are completed and reveal the tumor cells to stain positively with cytokeratin AE 1/AE3, CK  5/6 and CK 7. The tumor cells are negative for CK 20, Estrogen receptor, p63 and TTF-1. All stains have  satisfactory positive and negative controls. The patient's prior cases will be reviewed and an additional stain for  JUAREZ-3 is pending in an attempt to pinpoint the primary site of this malignancy and results will follow in a  supplemental report.      Supplemental Diagnosis  3/11/2019  The current axillary mass is compared to the patient's prior right lung biopsy (case number [id number]) and the  current axillary mass is morphologically similar to the lung malignancy. Also,  the current axillary mass is compared  to the patient's prior breast resection (Case number [id number]) and appears similar as well. P63 is negative in the  [id number] tumor and CK 5/6 shows scattered positive staining in the [id number] tumor.  Immunostain for JUAREZ-3 is completed and shows only rare weak to moderate staining within the tumor cell nuclei  with satisfactory positive and negative controls. This stain is positive in the surrounding lymphocytes. This staining  pattern is non-specific and does not definitively differentiate between a lung and breast primary malignancy in this  right axilla mass biopsy. The staining profile of the current axillary mass match more closely with the previous  breast carcinoma (due to the p63 negativity in both the 2014 breast cancer and the current axillary mass lesion but  p63 positivity in the lung mass from 2018).  This staining profile, together with the comparison with the patient's prior breast tumor and prior lung tumor supports  a diagnosis of poorly differentiated carcinoma and the malignancy appears morphologically similar to the prior  malignancies from both sites, but the staining profile in this small sample from the axillary mass more closely  correlates with the prior breast malignancy. Pathologic subclassification of this malignancy's primary location is not  definitive and clinical correlation is recommended definitively decide on possible primary location in this patient's  axillary mass sample.  Supplemental (2):  Additional immunohistochemical staining for progesterone receptor and HER2 are completed per clinician request  with following results:  Progesterone receptor: Negative; 0% nuclear tumor cell staining.  HER2: Negative; stain score = 0.  Supplemental (3):  [place] DIAGNOSIS:  FINAL DIAGNOSIS  Breast, right, needle core biopsy ([id number]; 06/17/2014): Invasive ductal carcinoma, [place] grade III (of  III), with focal micropapillary  features.  Immunohistochemical stains performed at the referring institution show that the tumor cells have the following  phenotype: - Estrogen receptor: Negative (0% tumor cells staining). - Progesterone receptor: Negative (0% tumor  cells staining). - HER2: Negative (score 0).  Lymph nodes, right, sentinel biopsy and lumpectomy ([id number]; 07/11/2014):  1. Right sentinel lymph node: A single (1) lymph node is negative for metastatic carcinoma.  Immunohistochemical stains performed by the referring institution and reviewed at [facility] for cytokeratin are  negative, confirming the diagnosis.  2. Right breast: Invasive ductal carcinoma with micropapillary features, per report 11.5 mm in greatest dimension.  Foci suspicious for lymphovascular invasion identified. Margins of resection are free of tumor.  Immunohistochemical stains performed by the referring institution and reviewed at [facility] show that the tumor  cells are negative for p63 and show patchy positivity for CK 5/6.  Lung, right, needle core biopsy ([id number]; 02/14/2018): Squamous cell carcinoma.    Immunohistochemical stains performed by the referring institution show the tumor cells are strongly positive for p63  and CK 5/6, while negative for TTF-1 and CK7. These result support the diagnosis. Axilla, right, needle core biopsy  ([id number]; 03/11/2019): Lymph node positive for metastatic carcinoma, most consistent with breast primary  (see comment).  Immunohistochemical stains performed by the referring institution and reviewed at [facility] show that the tumor  cells are negative for p63, focally positive for CK 5/6, focally and weakly positive for JUAREZ-3, and strongly positive  for CK7. They are also negative for estrogen receptor, progesterone receptor, and HER2 JAM (score 0).  COMMENT:  Thank you for allowing me to review the case of this 73-year-old lady who recently underwent needle core biopsies  of a right axillary mass and who has a  previous diagnosis of an invasive ductal carcinoma of the right breast, as well  as a squamous cell carcinoma of the lung. I am reviewing this case because of my special interest in breast  pathology.  I agree with your interpretation of this case. In my opinion, the lung tumor corresponds to a squamous cell  carcinoma and appears to be unrelated to the invasive ductal carcinoma of the breast. The axillary mass, in my  opinion, corresponds to metastases of the breast primary. Although it is a poorly differentiated carcinoma, the  immunophenotype is most consistent with the one that the breast primary exhibited, and is inconsistent with a  metastatic squamous cell carcinoma. I did share the case with Dr. Anabel Stone, one of our pulmonary pathologists,  and she concurs with this interpretation.  Note: Report attached.  Performing location:  [place] [address]      LYMPH NODE, RIGHT AXILLA, BIOPSY: 6/16/21   - Metastatic poorly-differentiated carcinoma, consistent with breast primary  -ER, NH, and HER2 immunohistochemical hormonal markers are also negative  PD-L1 (22C3) SemiQuant IHC, Manual  Interpretation  Right axillary lymph node , specimen for PD-L1 immunohistochemistry studies (clone 22C3, [employer], [place], [place]; using a proprietary detection system) ([id number]):  Reported carcinoma site of origin: Breast  Result: The percent of PD-L1 positive cells based upon the total number of tumor cells (combined positive  score, CPS) is greater than or equal to 10.  Interpretation: Studies suggest that positive PD-L1 immunohistochemistry in tumor cells and/or tumorassociated  immune cells may predict tumor response to therapy with immune checkpoint inhibitors. This  result should not be used as the sole factor in determining treatment, as other factors (for example, tumor  mutation burden, and microsatellite instability) have been also  studied as predictive markers.          CT neck/chest/abd/pelvis w/contrast 4/26/2019   The previously described right hilar mass is no longer visible.  There is persistent volume loss in the right middle lobe this appears similar to the prior PET-CT February 21, 2019.    Persistent abnormal right axillary node today measuring about 15 mm compared 18 mm prior.  The positioning of the adjacent nodes is slightly different likely accounting for the slight difference in measurement.    Cluster of tree-in-bud nodular densities left lower lobe series 2, image 93.  This may be related to small airways disease though serial follow-up suggested.      PET/CT 6/20/2019   New and worsening hypermetabolic lymph nodes concerning for metastatic breast cancer as described above.  Tissue sampling would be required for definitive diagnosis.      2-D echo 6/27/2019   Normal left ventricular systolic function. The estimated ejection fraction is 55%  Concentric left ventricular remodeling.  Normal LV diastolic function.  Normal right ventricular systolic function.  Moderate left atrial enlargement.  Mild right atrial enlargement.  Mild aortic regurgitation.  Mild mitral regurgitation.  Mild tricuspid regurgitation.  Normal central venous pressure (3 mm Hg).  The estimated PA systolic pressure is 25 mm Hg      PET/CT 9/19/2019   Favorable response to therapy with interval decrease in size and uptake of several right axillary lymph nodes and a supraclavicular lymph node.  Mild residual activity may indicate viable tumor.    No new hypermetabolic findings worrisome for malignancy.    PET/CT 2/28/2020  1.  No evidence of hypermetabolic tumor.  Continued improvement of the right axilla status post adjuvant radiation.    2.  No new hypermetabolic lesions      CTA 6/17/2020  1. No evidence of pulmonary embolus. No aortic dissection.   2. There is no evidence for new or progressive disease in the chest as compared to PET/CT 6/20/2019 in this  patient with history of right breast cancer and right lung neoplasm. The patient does have a more recent PET/CT 2/28/2020 from an outside facility per the electronic medical record although images are not available for direct comparison. Correlation with these images may be beneficial. Continued long-term surveillance recommended.  3. Stable appearance of the treated tumor in the right hilum/middle lobe.  4. Stable treated right breast cancer and axillary adenopathy without evidence for developing breast mass or new right axillary adenopathy.  5. Stable tiny nodularity/tree-in-bud densities in the left lung, probably postinfectious or inflammatory.  6. Wall thickening of the esophagus may be correlated for esophagitis. Possible tiny hiatus hernia.  7. Slight bronchial wall thickening may be correlated for bronchitis.  8. Mild to moderate emphysema as before.  9. Reflux of contrast into the IVC and hepatic veins is nonspecific but may be correlated for elevated right heart pressures.  10. Coronary calcifications and/or stents similar to prior.    Echo 5/21/2020  Technically difficult study.   Normal left ventricular systolic function.   Left ventricular ejection fraction is estimated at 55%.   Severe mitral annular calcification.   Mild mitral valve regurgitation.   Aortic valve sclerosis without stenosis or regurgitation.     PFTs 6/17/2020  Spirometry reveals a very severe obstructive lung defect, which does not significantly improve post bronchodilators. Lung volumes revealed air trapping. Diffusing capacity was moderately impaired.        Del 6/26/2020  BI-RADS Category:   Overall: 2 - Benign      PET/CT 10/23/2020   Impression:     Stable mildly hypermetabolic right axillary lymph node/nodular density contralateral to the site of injection.  Differential considerations include metastatic lymph node, reactive lymph node from benign right upper extremity process, and fat necrosis.  Recommend physical examination  of the upper extremity and consideration of ultrasound for further evaluation of the node/nodule and possible image guided biopsy.  Otherwise, attention on follow-up.     Otherwise, no other hypermetabolic disease identified      Mammo diagnostic right /US 11/4/2020   Impression:   1. No suspicious finding either on mammogram or ultrasound at the site of the palpable lump in the right breast at 10:00 o'clock. A bilateral mammogram is recommended in June 2020 to return to the patient to annual screening.   2. Redemonstration of the morphologically abnormal lymph node in the right axilla that contains a biopsy clip, compatible with the known recurrence metastatic breast cancer that has been treated with chemotherapy. The appearance of this lymph node is unchanged from 06/26/2020 and overall appears smaller when compared to 03/11/2019.     Abd US 12/11/2020   Impression:     1. Echogenic liver likely representing hepatic steatosis.  2. Right upper quadrant ultrasound otherwise is unremarkable.     PET/CT 10/14/21     Impression:     1.  No definite evidence of hypermetabolic tumor.  Interval resolution of hypermetabolic right axillary lymph nodes.  No new hypermetabolic findings.     2.  Persistent low-grade diffuse cutaneous uptake which is nonspecific.    MRI shoulder w w/out contrast 1/26/22   Impression:     Mild edema and postcontrast enhancement at the myotendinous junction of the supraspinatus and infraspinatus, it is nonspecific and could be due to degenerative change or tendinosis.     Small area of interstitial tear at the footprint insertion of the infraspinatus tendon.     No large rotator cuff tear.     No advanced degenerative change.     No osseous lesions.     PET/CT 1/26/22  Impression:In this patient with breast cancer, there is no evidence of hypermetabolic tumor to suggest recurrent or metastatic disease.   Less extensive but, nonspecific, low-grade diffuse cutaneous uptake of the right breast.        PET/CT 4/27/22  Impression:     1.  No FDG avid disease to suggest recurrence or metastatic disease.     Unchanged right breast skin thickening with mild FDG uptake.       PET/CT 7/13/22   Impression:     In this patient with lung and breast cancer, there is new left lower lobe opacification with mild radiotracer uptake which may represent aspiration, pneumonia, or malignancy.  Tissue sampling would be required for definitive diagnosis.     Unchanged right breast skin thickening with mild radiotracer uptake.    CT chest w/contrast 8/25/22    Decreasing patchy pulmonary consolidation within the left lower lobe when compared to prior PET-CT scan dated 07/13/2022.  The overall appearance of the patchy consolidation as well as the moderate improvement when compared to the prior study suggest a benign etiology such as left lower lobe pneumonia.     Persistent band of atelectasis/scarring within the right middle lobe, stable dating back to 04/26/2019.     Coronary artery atherosclerosis in a multi vessel distribution.     There are no measurable lesions per RECIST criteria.    PET /CT  11/21/22 shows New right lower lobe infiltrate worrisome for pneumonia or aspiration.Chronic infiltrate right middle lobe.   Resolution of the left lower lobe infiltrate.    Mammo 7/26/22  BI-RADS Category:   Overall: 2 - Benign        CT chest with contrast 11/21/22    Impression:     New right lower lobe infiltrate worrisome for pneumonia or aspiration.     Chronic infiltrate right middle lobe.     Resolution of the left lower lobe infiltrate.     Coronary artery calcifications.         PET/CT 1/11/23  Impression:     1. In this patient with lung and breast cancer, there is stable right breast skin thickening with mild radiotracer uptake.  2. Interval resolution of hypermetabolic opacification in the left lower lobe as compared to FDG PET-CT 07/14/2022.  Interval improvement in patchy opacities in the right lower lobe as compared to  CT 11/21/2022    .                Electronically Signed By: Tapan Young MD 4/16/2023 23:17 CDT, [facility]  Narrative    PET/CT 4/14/23  [facility]  HISTORY:       Malignant neoplasm of female breast, unspecified estrogen receptor status, unspecified laterality, unspecified site of breast (CMS/HCC).       ICD10:  C50.919   Malignant neoplasm of female breast, unspecified estrogen receptor status, unspecified laterality, unspecified site of breast (CMS/HCC)       REFERENCE EXAMS:     7/13/2022 PET CT ([facility]) (no images, report only)     6/20/2019 PET CT ([facility])       TECHNIQUE:     PET/CT with attenuation correction.     Dose:  13.62 mCi of FDG-18     Injection site:  left wrist     Field of view:  Skull base to thighs.     Arm position:  above head     Baseline glucose:  128 mg/dL       FINDINGS:       All SUV values are max SUV.     Head/Neck:     Physiologic uptake of radiotracer.         Thorax:     Right portacath.       Cutaneous thickening in the right breast (SUV=1.58).       Calcification of the mitral valve, similar to 6/20/2019.       Coronary artery atherosclerotic calcification.     Atelectasis/scarring along the inferior aspect of the right major fissure, similar to 6/20/2019.         Abdomen/Pelvis:     Physiologic uptake in the genitourinary system.     Physiologic uptake in the intestines.       Patchy atherosclerotic calcification in the aorta and iliac arteries.       Impression      No opacity in the left lower lobe on the current exam to correspond to a left lower lobe opacity described on the 7/13/2022 PET CT report.  Images from the 7/13/2022 PET CT are not available at the time of this report.       Cutaneous thickening in the right breast with mild uptake.  There was a similar finding described on the 71/3/2022 PET CT report.           Electronically Signed By: Tapan Young MD 4/16/2023 23:17 BOB, [facility]    Assessment:       1. Recurrent  breast cancer, unspecified laterality    2. History of lung cancer    3. Chronic obstructive pulmonary disease, unspecified COPD type    4. Dysphagia, unspecified type    5. Murmur                  Plan:     1.,2.   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  S/p adjuvant RT completed 9/2014 Pt declined adjuvant chemo  Pt  hospitalized 11/2017 with acute-on-chronic  resp failure  Abnormal CT imaging revealing Large right hilar mass with involvement of  adjacent segmental pulmonary arterial branches and bronchi with associated postobstructive atelectasis  and involvement of mediastinal and axillary adenopathy   S/p rt axillary LN bx ( outside facility) - metastatic poorly differentiated carcinoma of unknown primary  site  status post  lung biopsy 1/31/2017 -benign lung tissue  PET/CT 1/26/2018   Intense FDG uptake within the known right infrahilar lung mass, compatible with malignancy.   Intensely hypermetabolic right axillary, retropectoral, and lower right cervical lymph nodes, compatible with metastases.  Abnormal FDG uptake along right breast skin thickening, new from prior chest CTA and mammogram.    Repeat lung bx 2/14/2018 revealed Squamous Cell CA lung PD-L1 10% EGFR NEGALK NEG  Pt treated for advanced squamous cell CA of lung She s/p  cycle 6 of carboplatin/Abraxane Day1 completed 7/2/2018 ( day 15 held due to prolonged cytopenias)  She completed therapy with near CR     PET/CT 2/21/2019 showed increased size and irregularity of the right axillary lymph node with increased FDG avidity     MAMMO/US rt breast 3/2019  reveals suspicious findings rt axilla LN.  Biopsy of the lymph node measuring 1.4 x 1.1 x 1.3     Pt s/p  rt axillary LN bxPositive for poorly differentiated carcinoma.- likely breast primary ERneg PRneg Her 2 neg    Outside review of specimen(s) revealed  lung tumor corresponds to a squamous cell  carcinoma and appears to be unrelated to the invasive ductal carcinoma of the breast. It was determined The axillary mass, in my opinion, corresponded  to metastases of the breast primary. Although it is a poorly differentiated carcinoma, theimmunophenotype is most consistent with the one that the breast primary exhibited, and is inconsistent with a metastatic squamous cell carcinoma.     CT imaging 4/26/2019 persistent rt axillary LAD, no other sites of disease     Lymph node biopsy 3/11/2019 Right axilla, mass, core biopsy:Positive for poorly differentiated carcinoma.favor breast primary     PET/CT 6/20/2019  New and worsening hypermetabolic lymph nodes concerning for metastatic breast cancer     Previously Discussed  imaging findings in detail with patient which reveals new and worsening hypermetabolic melena metabolic lymph nodes including hypermetabolic supraclavicular node.  Therefore, at treatment option will be systemic chemotherapy in light of the recent imaging findings.  She will be followed by her surgical oncologist Dr. Christopher      IT was determined to proceed with systemic chemotherapy   S/p  AC [id number] x 3 wks x 4 wks completed 9/6/2019   ( pt has not received in past)      PET/CT 9/19/2019 shows disease response with interval decrease in size and uptake of several right axillary lymph nodes and a supraclavicular lymph node.  Mild residual activity may indicate viable tumor.No new hypermetabolic findings worrisome for malignancy.    Pt followed by  Breast Surgery and plan was  for possible   ALND. Pt with Recurrent cancer in her right axilla and right supraclavicular fossa.   She completed chemotherapy 9/6/2019 with near CR  It was determined  Radiation will be given to the original areas of hypermetabolic activity in the right axilla and right supraclavicular region    Pt completed  RT 12/16/2019     PET/CT 1/14/21 shows   New right axillary hypermetabolic lymph nodes with preserved normal  architecture suggestive of reactive nodes.  Stable appearing previously identified hypermetabolic lymph node with mild increase in surrounding fat stranding.        Findings previously d/w with treating breast surgeon and it was determined to cont to monitor and close f/u as pt is heavily pre treated, has received RT to site   Pt acknowledged understanding and agreeable with plan     PET/CT imaging  5/20/21 shows Continued increase in both size and hypermetabolic activity of right axillary level 1 lymph nodes a new, mildly hypermetabolic cutaneous thickening of the right breast.     Right breast, skin, punch biopsy:Negative for carcinoma    LYMPH NODE, RIGHT AXILLA, BIOPSY: 6/16/21   - Metastatic poorly-differentiated carcinoma, consistent with breast primary triple neg  PD-L1 immunohistochemistry studies(combined positive score, CPS) is greater than or equal to 10   PET/CT showed  No definite evidence of hypermetabolic tumor.  Interval resolution of hypermetabolic right axillary lymph nodes.  No new hypermetabolic findings.      S/p C16 pembrolizumab 6/15/22   Last  PET/CT imaging shows  new left lower lobe opacification with mild radiotracer uptake which may represent aspiration, pneumonia, or malignancy.  Recent follow-up imaging studies decreasing patchy pulmonary consolidation within the left lower lobe when compared to prior PET-CT scan dated 07/13/2022.  Plan to remain off of therapy   Follow-up PET/CT imaging 1/11/23 Interval resolution of hypermetabolic opacification in the left lower lobe as compared to FDG PET-CT 07/14/2022.  Interval improvement in patchy opacities in the right lower lobe as compared to CT 11/21/2022   follow up PET imaging -  performed at [facility] .([facility] was not available at  since broken )No opacity in the left lower lobe on the current exam to correspond to a left lower lobe opacity described on the 7/13/2022 PET CT report.  Images from the 7/13/2022 PET CT are not available  at the time of this report.         3. F/b Pulmonology  Pt on supp 02 at night   Cont MDI and O2 as prescribed       4.  GI eval planned next month    5. 2 d echo today     Cbc,cmp, Mag,  prior to follow-up 2mos    PAC FLUSH today     Advance Care Planning     Power of   I previously  initiated the process of advance care planning today and explained the importance of this process to the patient.  I introduced the concept of advance directives to the patient, as well. Then the patient received detailed information about the importance of designating a Health Care Power of  (HCPOA). She was also instructed to communicate with this person about their wishes for future healthcare, should she become sick and lose decision-making capacity. The patient has previously appointed a HCPOA. After our discussion, the patient has decided to complete a HCPOA and has appointed her son and niece and  I spent a total time of 16 minutes discussing this issue with the patient.         Cc: Swetha Katz M.D.         MD Ranjan Roberson MD

## 2023-06-09 ENCOUNTER — TELEPHONE (OUTPATIENT)
Dept: HEMATOLOGY/ONCOLOGY | Facility: CLINIC | Age: 78
End: 2023-06-09
Payer: MEDICARE

## 2023-06-12 DIAGNOSIS — C50.919 RECURRENT BREAST CANCER, UNSPECIFIED LATERALITY: Primary | ICD-10-CM

## 2023-06-12 DIAGNOSIS — R01.1 MURMUR, CARDIAC: ICD-10-CM

## 2023-06-13 NOTE — TELEPHONE ENCOUNTER
Completed FMLA called patient and left voicemail on [phone].   Spoke with patient and she states that the pet scan department at [facility] will give her a disc after the scan.

## 2023-06-20 RX ORDER — FLUTICASONE PROPIONATE 50 MCG
SPRAY, SUSPENSION (ML) NASAL
Qty: 48 G | Refills: 1 | Status: SHIPPED | OUTPATIENT
Start: 2023-06-20

## 2023-06-20 NOTE — TELEPHONE ENCOUNTER
No care due was identified.  [facility] Embedded Care Due Messages. Reference number: [id number].   6/19/2023 7:55:06 PM CDT

## 2023-06-20 NOTE — TELEPHONE ENCOUNTER
Refill Routing Note   Refill Routing Note   Medication(s) are not appropriate for processing by [facility] for the following reason(s):      No active prescription written by PCP    [facility] action(s):  Defer None identified     Medication Therapy Plan: Last ordered by PCP: 3/17/21  Medication reconciliation completed: No     Appointments  past 12m or future 3m with PCP    Date Provider   Last Visit   10/17/2022 Jeannine Huitron MD   Next Visit   Visit date not found Jeannine Huitron MD   ED visits in past 90 days: 0        Note composed:8:02 PM 06/19/2023

## 2023-06-21 ENCOUNTER — OFFICE VISIT (OUTPATIENT)
Dept: CARDIOLOGY | Facility: CLINIC | Age: 78
End: 2023-06-21
Payer: MEDICARE

## 2023-06-21 VITALS
RESPIRATION RATE: 18 BRPM | HEIGHT: 63 IN | SYSTOLIC BLOOD PRESSURE: 130 MMHG | OXYGEN SATURATION: 98 % | BODY MASS INDEX: 28.26 KG/M2 | HEART RATE: 58 BPM | DIASTOLIC BLOOD PRESSURE: 74 MMHG | WEIGHT: 159.5 LBS

## 2023-06-21 DIAGNOSIS — I25.118 CORONARY ARTERY DISEASE OF NATIVE ARTERY OF NATIVE HEART WITH STABLE ANGINA PECTORIS: ICD-10-CM

## 2023-06-21 DIAGNOSIS — R06.09 DOE (DYSPNEA ON EXERTION): ICD-10-CM

## 2023-06-21 DIAGNOSIS — Z66 DNR (DO NOT RESUSCITATE): Chronic | ICD-10-CM

## 2023-06-21 DIAGNOSIS — I10 PRIMARY HYPERTENSION: Primary | ICD-10-CM

## 2023-06-21 DIAGNOSIS — I70.0 ATHEROSCLEROSIS OF AORTA: Chronic | ICD-10-CM

## 2023-06-21 DIAGNOSIS — C34.90 SQUAMOUS CELL CARCINOMA OF LUNG, UNSPECIFIED LATERALITY: Chronic | ICD-10-CM

## 2023-06-21 DIAGNOSIS — I50.32 CHRONIC HEART FAILURE WITH PRESERVED EJECTION FRACTION: ICD-10-CM

## 2023-06-21 PROCEDURE — 1125F PR PAIN SEVERITY QUANTIFIED, PAIN PRESENT: ICD-10-PCS | Mod: CPTII,S$GLB,, | Performed by: INTERNAL MEDICINE

## 2023-06-21 PROCEDURE — 99999 PR PBB SHADOW E&M-EST. PATIENT-LVL IV: CPT | Mod: PBBFAC,,, | Performed by: INTERNAL MEDICINE

## 2023-06-21 PROCEDURE — 3288F FALL RISK ASSESSMENT DOCD: CPT | Mod: CPTII,S$GLB,, | Performed by: INTERNAL MEDICINE

## 2023-06-21 PROCEDURE — 3288F PR FALLS RISK ASSESSMENT DOCUMENTED: ICD-10-PCS | Mod: CPTII,S$GLB,, | Performed by: INTERNAL MEDICINE

## 2023-06-21 PROCEDURE — 1101F PT FALLS ASSESS-DOCD LE1/YR: CPT | Mod: CPTII,S$GLB,, | Performed by: INTERNAL MEDICINE

## 2023-06-21 PROCEDURE — 3078F PR MOST RECENT DIASTOLIC BLOOD PRESSURE < 80 MM HG: ICD-10-PCS | Mod: CPTII,S$GLB,, | Performed by: INTERNAL MEDICINE

## 2023-06-21 PROCEDURE — 3078F DIAST BP <80 MM HG: CPT | Mod: CPTII,S$GLB,, | Performed by: INTERNAL MEDICINE

## 2023-06-21 PROCEDURE — 1157F ADVNC CARE PLAN IN RCRD: CPT | Mod: CPTII,S$GLB,, | Performed by: INTERNAL MEDICINE

## 2023-06-21 PROCEDURE — 3075F SYST BP GE 130 - 139MM HG: CPT | Mod: CPTII,S$GLB,, | Performed by: INTERNAL MEDICINE

## 2023-06-21 PROCEDURE — 99214 PR OFFICE/OUTPT VISIT, EST, LEVL IV, 30-39 MIN: ICD-10-PCS | Mod: S$GLB,,, | Performed by: INTERNAL MEDICINE

## 2023-06-21 PROCEDURE — 99214 OFFICE O/P EST MOD 30 MIN: CPT | Mod: S$GLB,,, | Performed by: INTERNAL MEDICINE

## 2023-06-21 PROCEDURE — 1159F MED LIST DOCD IN RCRD: CPT | Mod: CPTII,S$GLB,, | Performed by: INTERNAL MEDICINE

## 2023-06-21 PROCEDURE — 1157F PR ADVANCE CARE PLAN OR EQUIV PRESENT IN MEDICAL RECORD: ICD-10-PCS | Mod: CPTII,S$GLB,, | Performed by: INTERNAL MEDICINE

## 2023-06-21 PROCEDURE — 3075F PR MOST RECENT SYSTOLIC BLOOD PRESS GE 130-139MM HG: ICD-10-PCS | Mod: CPTII,S$GLB,, | Performed by: INTERNAL MEDICINE

## 2023-06-21 PROCEDURE — 99999 PR PBB SHADOW E&M-EST. PATIENT-LVL IV: ICD-10-PCS | Mod: PBBFAC,,, | Performed by: INTERNAL MEDICINE

## 2023-06-21 PROCEDURE — 1159F PR MEDICATION LIST DOCUMENTED IN MEDICAL RECORD: ICD-10-PCS | Mod: CPTII,S$GLB,, | Performed by: INTERNAL MEDICINE

## 2023-06-21 PROCEDURE — 1125F AMNT PAIN NOTED PAIN PRSNT: CPT | Mod: CPTII,S$GLB,, | Performed by: INTERNAL MEDICINE

## 2023-06-21 PROCEDURE — 1101F PR PT FALLS ASSESS DOC 0-1 FALLS W/OUT INJ PAST YR: ICD-10-PCS | Mod: CPTII,S$GLB,, | Performed by: INTERNAL MEDICINE

## 2023-06-21 NOTE — PROGRESS NOTES
Subjective:   Patient ID:  Ade Castellano is a 77 y.o. female who presents for follow-up of Follow-up      HPI    CAD - KUN to RVA 2006, diastolic CHF, HTN, HLD, lung CA, breast CA    Previously followed by Dr Skelton  Patient with a past medical history of coronary artery disease.  Past medical history significant for lung cancer.  No significant coronary artery disease on coronary angiogram had luminal irregularities.     Heart failure with preserved ejection fraction     Squamous cell carcinoma of lung     Breast cancer     Aortic arch atherosclerosis    [facility] 8/13/20  Non-obstructive CAD.  No significant coronary artery stenosis. Luminal irregularities. Previously placed RCA stent is widely patent.    Stress test 7/24/20    The study shows abnormal myocardial perfusion.    Abnormal myocardial perfusion study.    Perfusion Defect There is a mild to moderate intensity, small to moderate sized, mostly reversible with some fixed areas defect in the anteroapical wall(s).    Gated perfusion images showed an ejection fraction of 71%    There is normal wall motion at rest and post stress.    The EKG portion of this study is negative for ischemia.    The patient reported no chest pain during the stress test.    There were no arrhythmias during stress.    Echo 6/7/23  The left ventricle is normal in size with normal systolic function.  The estimated ejection fraction is 55%.  Mild left atrial enlargement.  Grade III left ventricular diastolic dysfunction.  Normal right ventricular size with normal right ventricular systolic function.  Mild right atrial enlargement.  Moderate mitral regurgitation.  Normal central venous pressure (3 mmHg).  The estimated PA systolic pressure is 40 mmHg.  There is moderate pulmonary hypertension.  Normal GLS -18.7%.    Holter 7/24/20  The diary was not returned.  NSR. Heart rates varied between 47 and 89 bpm with an average of 58 bpm.  Rare PACs / PVCs    EKG 4/25/23 Sinus bradycardia  otherwise ok     6/21/23 Denies CP, Stable LOGAN  Stopped metoprolol recently for hypotension  BP controlled    Review of Systems   Constitutional: Negative for decreased appetite.   HENT:  Negative for ear discharge.    Eyes:  Negative for blurred vision.   Respiratory:  Negative for hemoptysis.    Endocrine: Negative for polyphagia.   Hematologic/Lymphatic: Negative for adenopathy.   Skin:  Negative for color change.   Musculoskeletal:  Negative for joint swelling.   Genitourinary:  Negative for bladder incontinence.   Neurological:  Negative for brief paralysis.   Psychiatric/Behavioral:  Negative for hallucinations.    Allergic/Immunologic: Negative for hives.     Objective:   Physical Exam  Constitutional:       Appearance: She is well-developed.   HENT:      Head: Normocephalic and atraumatic.   Eyes:      Conjunctiva/sclera: Conjunctivae normal.      Pupils: Pupils are equal, round, and reactive to light.   Cardiovascular:      Rate and Rhythm: Normal rate.      Pulses: Intact distal pulses.      Heart sounds: Normal heart sounds.   Pulmonary:      Effort: Pulmonary effort is normal.      Breath sounds: Normal breath sounds.   Abdominal:      General: Bowel sounds are normal.      Palpations: Abdomen is soft.   Musculoskeletal:         General: Normal range of motion.      Cervical back: Normal range of motion and neck supple.   Skin:     General: Skin is warm and dry.   Neurological:      Mental Status: She is alert and oriented to person, place, and time.       Assessment:      1. Primary hypertension    2. LOGAN (dyspnea on exertion)    3. Chronic heart failure with preserved ejection fraction    4. Coronary artery disease of native artery of native heart with stable angina pectoris    5. Atherosclerosis of aorta    6. Squamous cell carcinoma of lung, unspecified laterality    7. DNR (do not resuscitate)        Plan:     Echo with EF 55% and moderate MR  Continue Rx for CAD, CHF, HTN, HLD  OV 3 months with  BNP, BMP

## 2023-06-26 PROBLEM — Z99.81 CHRONIC RESPIRATORY FAILURE WITH HYPOXIA, ON HOME O2 THERAPY: Status: RESOLVED | Noted: 2023-03-21 | Resolved: 2023-06-26

## 2023-06-26 PROBLEM — J96.11 CHRONIC RESPIRATORY FAILURE WITH HYPOXIA, ON HOME O2 THERAPY: Status: RESOLVED | Noted: 2023-03-21 | Resolved: 2023-06-26

## 2023-07-10 ENCOUNTER — HOSPITAL ENCOUNTER (OUTPATIENT)
Facility: HOSPITAL | Age: 78
Discharge: HOME | End: 2023-07-10
Attending: INTERNAL MEDICINE | Admitting: INTERNAL MEDICINE
Payer: MEDICARE

## 2023-07-10 VITALS
BODY MASS INDEX: 28 KG/M2 | OXYGEN SATURATION: 98 % | HEIGHT: 63 IN | SYSTOLIC BLOOD PRESSURE: 142 MMHG | HEART RATE: 52 BPM | TEMPERATURE: 98 F | RESPIRATION RATE: 15 BRPM | WEIGHT: 158 LBS | DIASTOLIC BLOOD PRESSURE: 78 MMHG

## 2023-07-10 DIAGNOSIS — R13.10 DYSPHAGIA: ICD-10-CM

## 2023-07-10 PROCEDURE — 91010 ESOPHAGUS MOTILITY STUDY: CPT | Mod: 26,HCNC,GC, | Performed by: INTERNAL MEDICINE

## 2023-07-10 PROCEDURE — 91110 GI TRC IMG INTRAL ESOPH-ILE: CPT | Mod: TC,HCNC | Performed by: INTERNAL MEDICINE

## 2023-07-10 PROCEDURE — 25000003 PHARM REV CODE 250: Mod: HCNC | Performed by: INTERNAL MEDICINE

## 2023-07-10 PROCEDURE — 91010 PR ESOPHAGEAL MOTILITY STUDY, MA2METRY: ICD-10-PCS | Mod: 26,HCNC,GC, | Performed by: INTERNAL MEDICINE

## 2023-07-10 RX ORDER — LIDOCAINE HYDROCHLORIDE 20 MG/ML
JELLY TOPICAL ONCE
Status: COMPLETED | OUTPATIENT
Start: 2023-07-10 | End: 2023-07-10

## 2023-07-10 RX ADMIN — LIDOCAINE HYDROCHLORIDE 10 ML: 20 JELLY TOPICAL at 09:07

## 2023-07-12 ENCOUNTER — TELEPHONE (OUTPATIENT)
Dept: GASTROENTEROLOGY | Facility: CLINIC | Age: 78
End: 2023-07-12
Payer: MEDICARE

## 2023-07-12 DIAGNOSIS — K22.0 ACHALASIA: Primary | ICD-10-CM

## 2023-07-12 NOTE — TELEPHONE ENCOUNTER
----- Message from George Fernandez RN sent at 7/10/2023  3:46 PM CDT -----  Regarding: petra lao,    Manometry ready for you in media and OneDrive.  Kind of a toss up on Type II and Type III. The distal latency is abnormal on all swallows. She has what appears to be panesophageal pressurization but I feel that occurs due to the LES not relaxaing.She has intrabolus pressurization then a simultaneous contraction that looks like it's spastic. I took some screen shots of type III looking contractions during dry swallows and after the 200cc rapid drink challenge. Your note said she got a little relief after dilation. She also says when things get stuck she feels bloated with difficulty breathing. Maybe EndoFlip will help figure this out? Mat want to pass this by Lilliana or Tessa too. Let me know if you have any questions.    LV3

## 2023-07-12 NOTE — [employer] PATIENT INSTRUCTIONS
Discharge Summary/Instructions after an Endoscopic Procedure  Patient Name: Ade Castellano  Patient MRN: [id number]  Patient YOB: 1945  Tuesday, July 11, 2023  Miller Phillips MD  Dear patient,  As a result of recent federal legislation (The Federal Cures Act), you may   receive lab or pathology results from your procedure in your [facility]   account before your physician is able to contact you. Your physician or   their representative will relay the results to you with their   recommendations at their soonest availability.  Thank you,  RESTRICTIONS:  During your procedure today, you received medications for sedation.  These   medications may affect your judgment, balance and coordination.  Therefore,   for 24 hours, you have the following restrictions:   - DO NOT drive a car, operate machinery, make legal/financial decisions,   sign important papers or drink alcohol.    ACTIVITY:  Today: no heavy lifting, straining or running due to procedural   sedation/anesthesia.  The following day: return to full activity including work.  DIET:  Eat and drink normally unless instructed otherwise.     TREATMENT FOR COMMON SIDE EFFECTS:  - Mild abdominal pain, nausea, belching, bloating or excessive gas:  rest,   eat lightly and use a heating pad.  - Sore Throat: treat with throat lozenges and/or gargle with warm salt   water.  - Because air was used during the procedure, expelling large amounts of air   from your rectum or belching is normal.  - If a bowel prep was taken, you may not have a bowel movement for 1-3 days.    This is normal.  SYMPTOMS TO WATCH FOR AND REPORT TO YOUR PHYSICIAN:  1. Abdominal pain or bloating, other than gas cramps.  2. Chest pain.  3. Back pain.  4. Signs of infection such as: chills or fever occurring within 24 hours   after the procedure.  5. Rectal bleeding, which would show as bright red, maroon, or black stools.   (A tablespoon of blood from the rectum is not serious, especially if    hemorrhoids are present.)  6. Vomiting.  7. Weakness or dizziness.  GO DIRECTLY TO THE NEAREST EMERGENCY ROOM IF YOU HAVE ANY OF THE FOLLOWING:      Difficulty breathing              Chills and/or fever over 101 F   Persistent vomiting and/or vomiting blood   Severe abdominal pain   Severe chest pain   Black, tarry stools   Bleeding- more than one tablespoon   Any other symptom or condition that you feel may need urgent attention  Your doctor recommends these additional instructions:  If any biopsies were taken, your doctors clinic will contact you in 1 to 2   weeks with any results.  - Return to GI clinic to further discuss management options  - Referral to bariatric surgery to consider heller myotomy or POEM  - Obtain timed barium swallow  For questions, problems or results please call your physician - Miller Phillips MD at Work:  [phone].  [facility], EMERGENCY ROOM PHONE NUMBER: [phone]  IF A COMPLICATION OR EMERGENCY SITUATION ARISES AND YOU ARE UNABLE TO REACH   YOUR PHYSICIAN - GO DIRECTLY TO THE EMERGENCY ROOM.  Miller Phillips MD  7/12/2023 9:41:51 AM  This report has been verified and signed electronically.  Dear patient,  As a result of recent federal legislation (The Federal Cures Act), you may   receive lab or pathology results from your procedure in your [facility]   account before your physician is able to contact you. Your physician or   their representative will relay the results to you with their   recommendations at their soonest availability.  Thank you,  PROVATION

## 2023-07-13 ENCOUNTER — OFFICE VISIT (OUTPATIENT)
Dept: GASTROENTEROLOGY | Facility: CLINIC | Age: 78
End: 2023-07-13
Payer: MEDICARE

## 2023-07-13 DIAGNOSIS — K22.0 ACHALASIA: Primary | ICD-10-CM

## 2023-07-13 PROCEDURE — 1157F PR ADVANCE CARE PLAN OR EQUIV PRESENT IN MEDICAL RECORD: ICD-10-PCS | Mod: HCNC,CPTII,S$GLB, | Performed by: INTERNAL MEDICINE

## 2023-07-13 PROCEDURE — 1157F ADVNC CARE PLAN IN RCRD: CPT | Mod: HCNC,CPTII,S$GLB, | Performed by: INTERNAL MEDICINE

## 2023-07-13 PROCEDURE — 99214 PR OFFICE/OUTPT VISIT, EST, LEVL IV, 30-39 MIN: ICD-10-PCS | Mod: HCNC,S$GLB,, | Performed by: INTERNAL MEDICINE

## 2023-07-13 PROCEDURE — 99214 OFFICE O/P EST MOD 30 MIN: CPT | Mod: HCNC,S$GLB,, | Performed by: INTERNAL MEDICINE

## 2023-07-13 NOTE — PROGRESS NOTES
[facility]  Gastroenterology   Clinic Note              Patient Name: Ade Castellano  Age: 77 y.o.  Sex: female  MRN: [id number]    TODAY'S DATE:  7/13/2023  REFERRING PROVIDER:  No ref. provider found     Diagnosis:   1. Achalasia          HPI:  Ade Castellano is a 77 y.o. female with a history of COPD, CAD, breast cancer '15, lung cancer s/p chemo/radiation who was referred for evaluation and treatment of dysphagia.    For review, patient has been seen by various GI providers in the past, most recently Dr. Healy in October of 2022, at which time she endorsed dysphagia to solids and liquids.  Plan for endoscopy was made, which was done in November with results as below.    Today patient confirms the above, noting that since her dilation her dysphagia has mildly improved, but remains present.  Her symptoms are particularly bad with rice and bread.    Interim history:    Since last being seen in May, she was able to get manometry done earlier this week which was consistent with type 3 achalasia, although with elements of type 2 as well.  This was discussed over the phone, and clinic appointment was arranged to further discuss diagnosis and management moving forward.  Today, she notes no major change in her symptoms, in the majority of our visit visit was spent explaining the diagnosis and treatment options of achalasia      PRIOR ENDOSCOPY:  -Colonoscopy:     07/26/22:    Impression:            - Five 3 to 4 mm polyps at the hepatic flexure and                          in the ascending colon, removed with a jumbo cold                          forceps. Resected and retrieved.                          - One 7 mm polyp in the descending colon, removed                          with a cold snare. Resected and retrieved.                          - One 4 mm polyp in the descending colon, removed                          with a cold snare. Resected and retrieved.                          - Non-bleeding external  hemorrhoids.   Recommendation:        - Repeat colonoscopy is not recommended due to                          current age (66 years or older) for surveillance.                          - Patient has a contact number available for                          emergencies. The signs and symptoms of potential                          delayed complications were discussed with the                          patient. Return to normal activities tomorrow.                          Written discharge instructions were provided to                          the patient.                          - Discharge patient to home.                          - Await pathology results.     -EGD - 11/08/22:  Findings:        Corkscrew esophagus, tertiary contractions        Hypertonic LES, A guidewire was placed and the scope was withdrawn.        Dilation was performed with a Savary dilator with mild resistance at        48 Fr. The dilation site was examined following endoscope        reinsertion and showed no change.        Diffuse atrophic mucosa was found in the entire examined stomach.        Biopsies were taken with a cold forceps for histology.        The examined duodenum was normal.   Impression:            - Gastric mucosal atrophy. Biopsied.                          - Normal examined duodenum.   Recommendation:        - Patient has a contact number available for                          emergencies. The signs and symptoms of potential                          delayed complications were discussed with the                          patient. Return to normal activities tomorrow.                          Written discharge instructions were provided to                          the patient.                          - Discharge patient to home (ambulatory).                          - Resume previous diet.                          - Continue present medications.                          - Await pathology results.                          -  Return to GI clinic at appointment to be                          scheduled. the dysphagia may be due to motility                          disorder and additional workup may be helpful         ROS: Negative other than above    Vital Signs:  LMP  (LMP Unknown)      General: Awoke and orientedx3, in no acute distress  HEENT: Normocephalic, atruamatic, Moist mucous membranes  CV: Regular rate and rhythm, no JVD  Pulm: Normal inspiratory effort, no audible wheezing  Abdomen: Soft, nontender, nondistended abdomen without rebound or guarding  Extremities: No clubbing, cyanosis or edema  Psych: Normal affect. Good eye contact     Labs:   No results found for: CRP, CALPROTECTIN  Lab Results   Component Value Date    HEPBSAG Negative 10/22/2014     No results found for: TBGOLDPLUS  Lab Results   Component Value Date    XZBBQPDE37PQ 38 11/30/2015     Lab Results   Component Value Date    WBC 3.68 (L) 06/07/2023    HGB 12.9 06/07/2023    HCT 40.6 06/07/2023    MCV 96 06/07/2023     06/07/2023     Lab Results   Component Value Date    CREATININE 0.9 06/07/2023    ALBUMIN 3.8 06/07/2023    BILITOT 0.4 06/07/2023    ALKPHOS 45 (L) 06/07/2023    AST 18 06/07/2023    ALT 16 06/07/2023       Assessment/Plan:  Ade Castellano is a 77 y.o. female with a history of COPD, CAD s/p PCI '06, breast cancer '15, lung cancer s/p chemo/radiation who was referred for evaluation and treatment of dysphagia.    # Achalasia [K22.0]    Manometry done this month with IRP of 45, absence of peristalsis, and simultaneous contractions in supine swallows with pan pressurization in upright swallows.  Consistent with achalasia, suspect type 3 with elements of type 2.  Although she does have an extensive oncologic history which could make pseudo achalasia in the differential as well, she follows with Oncology, last PET scan 6 months ago without active disease.    Today, I discussed treatment options moving forward, beginning with Heller myotomy and  peroral endoscopic myotomy.  She is skeptical for invasive procedures, and also is very against traveling to [facility] for treatment.  I offered her a trial of Botox injection on the [place], which she was in favor of.  I did inform her that the effects of Botox tend to be transient, but it is a reasonable 1st step.    -- Barium esophogram  -- EGD with botox injection  -- Continue to followup with oncology to ensure no active disease which could be contributing to pseudoachalasia.    RTC 3 mo    Thank you for involving us in the care of this patient.        Miller Phillips MD  Department of Gastroenterology & Hepatology

## 2023-07-14 ENCOUNTER — HOSPITAL ENCOUNTER (OUTPATIENT)
Dept: RADIOLOGY | Facility: HOSPITAL | Age: 78
Discharge: HOME | End: 2023-07-14
Attending: INTERNAL MEDICINE
Payer: MEDICARE

## 2023-07-14 DIAGNOSIS — K22.0 ACHALASIA: ICD-10-CM

## 2023-07-14 DIAGNOSIS — C50.919 PRIMARY MALIGNANT NEOPLASM OF BREAST WITH METASTASIS: Primary | Chronic | ICD-10-CM

## 2023-07-14 DIAGNOSIS — Z12.31 ENCOUNTER FOR SCREENING MAMMOGRAM FOR MALIGNANT NEOPLASM OF BREAST: ICD-10-CM

## 2023-07-14 PROCEDURE — 25500020 PHARM REV CODE 255: Mod: HCNC | Performed by: INTERNAL MEDICINE

## 2023-07-14 PROCEDURE — 74220 X-RAY XM ESOPHAGUS 1CNTRST: CPT | Mod: 26,HCNC,, | Performed by: RADIOLOGY

## 2023-07-14 PROCEDURE — A9698 NON-RAD CONTRAST MATERIALNOC: HCPCS | Mod: HCNC | Performed by: INTERNAL MEDICINE

## 2023-07-14 PROCEDURE — 74220 X-RAY XM ESOPHAGUS 1CNTRST: CPT | Mod: TC,HCNC

## 2023-07-14 PROCEDURE — 74220 FL ESOPHAGRAM COMPLETE: ICD-10-PCS | Mod: 26,HCNC,, | Performed by: RADIOLOGY

## 2023-07-14 RX ADMIN — BARIUM SULFATE 200 ML: 980 POWDER, FOR SUSPENSION ORAL at 09:07

## 2023-07-17 ENCOUNTER — TELEPHONE (OUTPATIENT)
Dept: ENDOSCOPY | Facility: HOSPITAL | Age: 78
End: 2023-07-17
Payer: MEDICARE

## 2023-07-17 DIAGNOSIS — R13.10 DYSPHAGIA, UNSPECIFIED TYPE: Primary | ICD-10-CM

## 2023-07-17 NOTE — TELEPHONE ENCOUNTER
"   ----- Message -----   From: Miller Phillips MD   Sent: 2023  10:47 AM CDT   To: [facility]     Procedure: EGD w/ Botox injection     Diagnosis: Dysphagia     Procedure Timin-12 weeks     *If within 4 weeks selected, please suman as high priority*     *If greater than 12 weeks, please select "5-12 weeks" and delay sending until 2 months prior to requested date*     Provider: Myself     Location:  Endo     Additional Scheduling Information: No scheduling concerns     Prep Specifications:N/A     Have you attached a patient to this message: yes        "

## 2023-07-17 NOTE — TELEPHONE ENCOUNTER
Spoke to pt to schedule procedure(s) Upper Endoscopy (EGD)       Physician to perform procedure(s) Dr. JEFF Phillips  Date of Procedure (s) 9/19/23  Arrival Time 1:15 PM  Time of Procedure(s) 2:15 PM   Location of Procedure(s) [address]  Type of Rx Prep sent to patient: N/A  Instructions provided to patient via Postal Mail    Patient was informed on the following information and verbalized understanding. Screening questionnaire reviewed with patient and complete. If procedure requires anesthesia, a responsible adult needs to be present to accompany the patient home, patient cannot drive after receiving anesthesia. Appointment details are tentative, especially check-in time. Patient will receive a prep-op call 4 days prior to confirm check-in time for procedure. If applicable the patient should contact their pharmacy to verify Rx for procedure prep is ready for pick-up. Patient was advised to call the scheduling department at [phone] if pharmacy states no Rx is available. Patient was advised to call the endoscopy scheduling department if any questions or concerns arise.       Endoscopy Scheduling Department         EGD Procedure Prep Instructions      Date of procedure: 9/19/23 Arrive at: 1:15PM      Location of Department: [address] [place] [id number]  Take the Elevators to 2nd Floor Endoscopy Procedural Area    How to prep:    Day Before Procedure: 9/18/23     You may have a light evening meal.   No solid food after 7:00 pm.   Continue drinking clear liquids.       Day of the Procedure:  9/19/23     You may have water/clear liquids until 4 hours before your procedure or as directed by the scheduling nurse  10:15AM. See below for list.    What You CANNOT do:   Do not drink milk or anything colored red.  Do not drink alcohol.  No gum chewing or candy morning of procedure.    Liquids That Are OK to Drink:   Water  Sports drinks (Gatorade, Power-Aid)  Coffee or tea (no cream or nondairy  creamer)  Clear juices without pulp (apple, white grape)  Gelatin desserts (no fruit or toppings)  Clear soda (sprite, coke, ginger ale)  Chicken broth (until 12 midnight the night before procedure)        IMPORTANT INFORMATION TO KNOW BEFORE YOUR PROCEDURE    [facility] 2nd Floor    If your procedure requires the administration of anesthesia, it is necessary for a responsible adult to drive you home. (Medical Transportation, Uber, Lyft, Taxi, etc. may ONLY be used if a responsible adult is present to accompany you home.  The responsible adult CAN'T be the  of the service).      person must be available to return to pick you up within 30 minutes of being notified of discharge.     Due to the limited socially distant seating in our waiting room, please limit your guest (1) who accompany you for this procedure. If someone accompanies you for this procedure into the facility:    Consider having them proceed to an area that is socially distant other than the lobby until a member of the medical team contacts them to provide an update after the procedure.     Also, please consider being dropped off and picked up from the facility.      Please bring a picture ID, insurance card, & copayment    Take Medications as directed below:    If you begin taking any blood thinning medications, please contact the  listed below as soon as possible.    If you are diabetic see the attached instruction sheet regarding your medication.     If you take HEART, BLOOD PRESSURE, SEIZURE, PAIN, LUNG (including inhalers/nebulizers), ANTI-REJECTION (transplant patients), or PSYCHIATRIC medications, please take at your regular times with a sip of water or as directed by the scheduling nurse.     Important contact information:    Endoscopy [phone] Hours of operation Monday-Friday 8:00-4:30pm.    Questions about insurance or financial obligations call [phone] or [phone].    If  you have questions regarding the prep or need to reschedule, please call [phone]. After hours questions requiring immediate assistance, contact [facility] nurse line at [phone] or [phone].   NOTE:     On occasion, unforeseen circumstances may cause a delay in your procedure start time. We respect your time and appreciate your patience during these circumstances.      Comments:

## 2023-08-07 ENCOUNTER — INFUSION (OUTPATIENT)
Dept: INFUSION THERAPY | Facility: HOSPITAL | Age: 78
End: 2023-08-07
Attending: INTERNAL MEDICINE
Payer: MEDICARE

## 2023-08-07 ENCOUNTER — OFFICE VISIT (OUTPATIENT)
Dept: HEMATOLOGY/ONCOLOGY | Facility: CLINIC | Age: 78
End: 2023-08-07
Payer: MEDICARE

## 2023-08-07 ENCOUNTER — HOSPITAL ENCOUNTER (OUTPATIENT)
Dept: RADIOLOGY | Facility: HOSPITAL | Age: 78
Discharge: HOME | End: 2023-08-07
Attending: INTERNAL MEDICINE
Payer: MEDICARE

## 2023-08-07 VITALS
SYSTOLIC BLOOD PRESSURE: 142 MMHG | DIASTOLIC BLOOD PRESSURE: 82 MMHG | OXYGEN SATURATION: 97 % | HEART RATE: 56 BPM | HEIGHT: 63 IN | WEIGHT: 164.44 LBS | BODY MASS INDEX: 29.14 KG/M2

## 2023-08-07 VITALS
HEART RATE: 50 BPM | SYSTOLIC BLOOD PRESSURE: 165 MMHG | RESPIRATION RATE: 18 BRPM | OXYGEN SATURATION: 97 % | DIASTOLIC BLOOD PRESSURE: 75 MMHG | TEMPERATURE: 99 F

## 2023-08-07 DIAGNOSIS — C50.919 PRIMARY MALIGNANT NEOPLASM OF BREAST WITH METASTASIS: Chronic | ICD-10-CM

## 2023-08-07 DIAGNOSIS — C50.919 PRIMARY MALIGNANT NEOPLASM OF BREAST WITH METASTASIS: ICD-10-CM

## 2023-08-07 DIAGNOSIS — J44.9 CHRONIC OBSTRUCTIVE PULMONARY DISEASE, UNSPECIFIED COPD TYPE: ICD-10-CM

## 2023-08-07 DIAGNOSIS — Z12.31 ENCOUNTER FOR SCREENING MAMMOGRAM FOR MALIGNANT NEOPLASM OF BREAST: ICD-10-CM

## 2023-08-07 DIAGNOSIS — R06.09 DOE (DYSPNEA ON EXERTION): ICD-10-CM

## 2023-08-07 DIAGNOSIS — R13.10 DYSPHAGIA, UNSPECIFIED TYPE: ICD-10-CM

## 2023-08-07 DIAGNOSIS — I50.32 CHRONIC HEART FAILURE WITH PRESERVED EJECTION FRACTION: ICD-10-CM

## 2023-08-07 DIAGNOSIS — Z85.118 HISTORY OF LUNG CANCER: ICD-10-CM

## 2023-08-07 DIAGNOSIS — C50.919 RECURRENT BREAST CANCER, UNSPECIFIED LATERALITY: Primary | ICD-10-CM

## 2023-08-07 DIAGNOSIS — C34.90 SQUAMOUS CELL CARCINOMA LUNG: Primary | ICD-10-CM

## 2023-08-07 PROCEDURE — 99999 PR PBB SHADOW E&M-EST. PATIENT-LVL III: ICD-10-PCS | Mod: PBBFAC,HCNC,, | Performed by: INTERNAL MEDICINE

## 2023-08-07 PROCEDURE — 63600175 PHARM REV CODE 636 W HCPCS: Mod: HCNC | Performed by: INTERNAL MEDICINE

## 2023-08-07 PROCEDURE — 1101F PR PT FALLS ASSESS DOC 0-1 FALLS W/OUT INJ PAST YR: ICD-10-PCS | Mod: HCNC,CPTII,S$GLB, | Performed by: INTERNAL MEDICINE

## 2023-08-07 PROCEDURE — 77067 MAMMO DIGITAL SCREENING BILAT WITH TOMO: ICD-10-PCS | Mod: 26,HCNC,, | Performed by: RADIOLOGY

## 2023-08-07 PROCEDURE — 77067 SCR MAMMO BI INCL CAD: CPT | Mod: TC,HCNC

## 2023-08-07 PROCEDURE — 1157F PR ADVANCE CARE PLAN OR EQUIV PRESENT IN MEDICAL RECORD: ICD-10-PCS | Mod: HCNC,CPTII,S$GLB, | Performed by: INTERNAL MEDICINE

## 2023-08-07 PROCEDURE — 77063 MAMMO DIGITAL SCREENING BILAT WITH TOMO: ICD-10-PCS | Mod: 26,HCNC,, | Performed by: RADIOLOGY

## 2023-08-07 PROCEDURE — 1126F AMNT PAIN NOTED NONE PRSNT: CPT | Mod: HCNC,CPTII,S$GLB, | Performed by: INTERNAL MEDICINE

## 2023-08-07 PROCEDURE — 99999 PR PBB SHADOW E&M-EST. PATIENT-LVL III: CPT | Mod: PBBFAC,HCNC,, | Performed by: INTERNAL MEDICINE

## 2023-08-07 PROCEDURE — 77063 BREAST TOMOSYNTHESIS BI: CPT | Mod: 26,HCNC,, | Performed by: RADIOLOGY

## 2023-08-07 PROCEDURE — 25000003 PHARM REV CODE 250: Mod: HCNC | Performed by: INTERNAL MEDICINE

## 2023-08-07 PROCEDURE — 3288F FALL RISK ASSESSMENT DOCD: CPT | Mod: HCNC,CPTII,S$GLB, | Performed by: INTERNAL MEDICINE

## 2023-08-07 PROCEDURE — 1126F PR PAIN SEVERITY QUANTIFIED, NO PAIN PRESENT: ICD-10-PCS | Mod: HCNC,CPTII,S$GLB, | Performed by: INTERNAL MEDICINE

## 2023-08-07 PROCEDURE — 99214 PR OFFICE/OUTPT VISIT, EST, LEVL IV, 30-39 MIN: ICD-10-PCS | Mod: HCNC,S$GLB,, | Performed by: INTERNAL MEDICINE

## 2023-08-07 PROCEDURE — 77067 SCR MAMMO BI INCL CAD: CPT | Mod: 26,HCNC,, | Performed by: RADIOLOGY

## 2023-08-07 PROCEDURE — 99214 OFFICE O/P EST MOD 30 MIN: CPT | Mod: HCNC,S$GLB,, | Performed by: INTERNAL MEDICINE

## 2023-08-07 PROCEDURE — 1157F ADVNC CARE PLAN IN RCRD: CPT | Mod: HCNC,CPTII,S$GLB, | Performed by: INTERNAL MEDICINE

## 2023-08-07 PROCEDURE — 3079F PR MOST RECENT DIASTOLIC BLOOD PRESSURE 80-89 MM HG: ICD-10-PCS | Mod: HCNC,CPTII,S$GLB, | Performed by: INTERNAL MEDICINE

## 2023-08-07 PROCEDURE — 96523 IRRIG DRUG DELIVERY DEVICE: CPT | Mod: HCNC

## 2023-08-07 PROCEDURE — 1101F PT FALLS ASSESS-DOCD LE1/YR: CPT | Mod: HCNC,CPTII,S$GLB, | Performed by: INTERNAL MEDICINE

## 2023-08-07 PROCEDURE — 3288F PR FALLS RISK ASSESSMENT DOCUMENTED: ICD-10-PCS | Mod: HCNC,CPTII,S$GLB, | Performed by: INTERNAL MEDICINE

## 2023-08-07 PROCEDURE — A4216 STERILE WATER/SALINE, 10 ML: HCPCS | Mod: HCNC | Performed by: INTERNAL MEDICINE

## 2023-08-07 PROCEDURE — 3079F DIAST BP 80-89 MM HG: CPT | Mod: HCNC,CPTII,S$GLB, | Performed by: INTERNAL MEDICINE

## 2023-08-07 PROCEDURE — 3077F PR MOST RECENT SYSTOLIC BLOOD PRESSURE >= 140 MM HG: ICD-10-PCS | Mod: HCNC,CPTII,S$GLB, | Performed by: INTERNAL MEDICINE

## 2023-08-07 PROCEDURE — 3077F SYST BP >= 140 MM HG: CPT | Mod: HCNC,CPTII,S$GLB, | Performed by: INTERNAL MEDICINE

## 2023-08-07 RX ORDER — HEPARIN 100 UNIT/ML
500 SYRINGE INTRAVENOUS
Status: DISCONTINUED | OUTPATIENT
Start: 2023-08-07 | End: 2023-08-07 | Stop reason: HOSPADM

## 2023-08-07 RX ORDER — SODIUM CHLORIDE 0.9 % (FLUSH) 0.9 %
10 SYRINGE (ML) INJECTION
Status: DISCONTINUED | OUTPATIENT
Start: 2023-08-07 | End: 2023-08-07 | Stop reason: HOSPADM

## 2023-08-07 RX ADMIN — Medication 10 ML: at 09:08

## 2023-08-07 RX ADMIN — HEPARIN 500 UNITS: 100 SYRINGE at 09:08

## 2023-08-07 NOTE — PROGRESS NOTES
Subjective:       Patient ID: Ade Castellano is a 77 y.o. female.    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 The PET(+) disease in the right axilla and right supraclavicular fossa will be boosted at 200 cGy per fraction to 6000 cGy total dose.  S/p LYMPH NODE, RIGHT AXILLA, BIOPSY: 6/16/21 . Metastatic poorly-differentiated carcinoma, c/w breast primary ERnegPRneg Her2 neg  PD-L1 (22C3) IHC CPS> is greater than or equal to 10  Pembrolizumab 7/22/21 -6/15/22         Prior Hx:  78 y/o female with history of  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  And Rt  breast Haja/p  cycle 6 of carboplatin/Abraxane 7/2/2018   She has  History of Stage 1A  Rt breast cancer Grade 3, ER/FL neg , Her 2 jose maria neg s/p lumpectomy 7/11/2014 and s/p adjuvant RT 9/2014 Pt declined Adjuvant chemo.Pathology showed a 1.15 cm, grade 3 infiltrating ductal carcinoma.  One sentinel lymph node was negative for malignancy.  Margins were negative and the closest margin was 1 cm.  She was staged  pT1c  pN0 cM0 stage IA.  She declined adjuvant chemo therapy.  She completed post operative radiation therapy at 400 cGy per fraction to 4000 cGy 9/2014  Pt hospitalized 11/2017 for  acute on chronic respiratory failure requiring intubation and ventilation. Has large lung mass in right lung with probable post obstructive pneumonia resulting in COPD exacerbation.CTA  chest 11/29/2017 revealed   Large right hilar mass with involvement of adjacent segmental pulmonary arterial branches and bronchi with associated postobstructive atelectasis and volume loss in the RML  with adjacent ground glass opacity concerning for underlying neoplastic process. Mediastinal and axillary adenopathy, with a right axillary lymph node measuring up to 2.0 cm in short axis diameter.She underwent rt axillary LN bx at outside facility- on 1/16/2018 at [facility]. Pathology revealed metastatic poorly differentiated carcinoma of unknown primary site. She next underwent lung bx at [facility] 1/31/2018  benign lung tissue. Repeat Right Lung Bx 2/14/2018 Pathology reveals Squamous cell carcinoma PD-L1 10% low expression EGFR NEG ALK NEG BRAF NEG. Outside slides axillary LN specimen were reviewed/comparison to lung bx findings . She completed    cycle 6 of carboplatin/Abraxane completed 7/2018 .PET/CT  4/19/2018 revealed there has been a excellent response to therapy.  At least 90% reduction in malignant activity.PET/CT 2/21/2019 increased size and irregularity of the right axillary lymph node with increased FDG avidityMAMMO/US rt breast 2/2019 revealed suspicious findings rt axilla LN.  Right axilla, mass, core biopsy: Positive for poorly differentiated carcinoma breast primary ER neg/MD neg Her2 neg.[facility] for [facility] reviewIt was determined  the lung tumor corresponds to a squamous cellcarcinoma and appears to be unrelated to the invasive ductal carcinoma of the breast. The axillary mass, in myopinion, corresponds to metastases of the breast primary. Although it is a poorly differentiated carcinoma, theimmunophenotype is most consistent with the one that the breast primary exhibited, and is inconsistent with metastatic squamous cell carcinoma. She was treated with  AC ( adriamycin 60m/m2/Cytoxan 600mg/m2)  q21d x 4 cycles completed 9/6/2019 . PET/CT 9/19/2019 shows disease response l decrease in  "size and uptake of several right axillary lymph nodes and a supraclavicular lymph node.  Mild residual activity may indicate viable tumor.Pt followed by Rad/Onc. She was presented at [facility] tumor board and it was determined to proceed Radiation therapy only. No ALND due to concurrent lung and supraclavicular node. She  completed  RT 12/16/2019  To right axilla and right supraclavicular region PET/CT imaging  5/20/21 shows Continued increase in both size and hypermetabolic activity of right axillary level 1 lymph nodes a new, mildly hypermetabolic cutaneous thickening of the right breast. she underwent LYMPH NODE, RIGHT AXILLA, BIOPSY: 6/16/21 . Pathology showed Metastatic poorly-differentiated carcinoma, consistent with breast primary-ERneg/ PRneg  HER2  neg  Pt started on pembrolizumab 7/2021  Pt hospitalized 4/6/22 with hypokalemia and orthostatic hypotension  S/p C16 pembrolizumab 6/15/22  ( 400mg q6wks)      Interval  No new issues  She continues with mild LOGAN-stable  Continues with occasional cough  She is f/b pulmonology, Dr. Katz  She continues with difficulty swallowing sometimes  She reports food " gets stuck" \   She has undergone dilation in past  She is f/b GI  Appetite and weight stable  She has supp 02 at home       PET /CT ( NOT DOTATATE) 4/14/23 No opacity in the left lower lobe on the current exam to correspond to a left lower lobe opacity described on the 7/13/2022 PET CT report.  Images from the 7/13/2022 PET CT are not available at the time of this report.       Cutaneous thickening in the right breast with mild uptake.  There was a similar finding described on the 71/3/2022 PET CT report.              Last Mammo 7/26/22 BI-RADS Category:   Overall: 2 - Benign             PrevHx:She had an abnormal mammogram 6/4/2014 whichRevealed a round solid mass 6mm in rt breast.She then underwent U/S guided core bx of rt breast mass on 6/17/2014.Pathology revealed infiltrating ductal carcinoma " "Grade 3 with tumorPresent in thin-walled spaces suggestive of lymphatic spaces. HormoneReceptor status on tumor specimen revealed ER negative 0% ,CT negative 0% and Her 2 jose maria negative.She subsequently underwent rt segmental mastectomy and SLN bxOn 7/11/2014. Pathology of rt breast lumpectomy revealed invasiveDuctal carcinoma with micropapillary pattern ( Invasive micropapillaryCa) with max tumor dimension 11.5mm with suggestion of tumor in Thin walled spaces c/w lymphovascular involvement. SurgicalMargins free of tumor, grade 3, ER/CT neg , Her 2 jose maria neg With [place] Lymph node negative for Neoplasm. Pathologic staging [id number](i-)Her mother was diagnosed with breast cancer in her 50's/        Review of Systems   Constitutional: Positive for mild  fatigue. No  activity change,  fever.   HENT: Positive for dysphagia. Negative for mouth sores, rhinorrhea.   Eyes: Negative for visual disturbance.   Respiratory: Positive for  cough ( improved)Positive for chronic  LOGAN Negative for wheezing.    Cardiovascular: Negative for chest pain.   Gastrointestinal:  Negative for abdominal pain and diarrhea.   Genitourinary: Negative for frequency.   Musculoskeletal: Negative for back pain.   Skin: Negative for rash.   Neurological: Negative for dizziness and headaches.   Hematological: Negative for adenopathy.   Psychiatric/Behavioral: The patient is not nervous/anxious.        Objective:        Vitals:    08/07/23 0758   BP: (!) 142/82   Pulse: (!) 56   SpO2: 97%   Weight: 74.6 kg (164 lb 7.4 oz)   Height: 5' 3" (1.6 m)           Physical Exam   Constitutional: She is oriented to person, place, and time. She appears ill, coughing episodes  HENT:   Head: Normocephalic.   Eyes: Conjunctivae and lids are normal.No scleral icterus.   Neck: Normal range of motion. Neck supple. No thyromegaly present.   Cardiovascular: Normal rate, regular rhythm and normal heart sounds.   murmur heard.  Pulmonary/Chest: Breath sounds normal. She has no " wheezes. She has no rales.   Abdominal: Soft. Bowel sounds are normal.  There is no tenderness. There is no rebound and no guarding.   Left breast- no masses or axillary LAD noted  Right breast- breast thickening inner quad    She has no cervical adenopathy.     She has rt axillary adenopathy.        Right: No supraclavicular adenopathy present.        Left: No supraclavicular adenopathy present.   Neurological: She is alert and oriented to person, place, and time. No cranial nerve deficit. Coordination normal.   Skin: Skin is warm and dry. No ecchymosis, no petechiae and no rash noted. No erythema.   Psychiatric: She has a normal mood and affect.             CT a/p w/contrast 11/29/2018   1. No acute abnormality identified within the abdomen and pelvis.    2.  There are a few nonspecific prominent lymph nodes in the upper abdomen including a periesophageal lymph node which measures 1.0 cm in short axis diameter.    3.  Significant abdominal aortic atherosclerosis and abdominal aorta ectasia.    4.  Additional findings as above.    PET/CT 1/26/2018   1.  Intense FDG uptake within the known right infrahilar lung mass, compatible with malignancy.  It is unclear if this represents primary lung malignancy or metastatic breast cancer.    2.  Intensely hypermetabolic right axillary, retropectoral, and lower right cervical lymph nodes, compatible with metastases.    3.  Abnormal FDG uptake along right breast skin thickening, new from prior chest CTA and mammogram.  This may relate to localized edema/inflammation, though correlation with physical exam and mammography are recommended to exclude underlying inflammatory carcinoma.      MRI Brain w w/out contrast 2/9/2018  1.  No evidence of intracranial metastases.  2.  Sinus disease       Rt axillary LN bx at outside facility- on 1/16/2018 at [facility]  Pathology revealed metastatic poorly differentiated carcinoma of unknown primary  site 1/16/2018 at [facility]  TTF-1 negative, napsin negative  , cytokeratin 7 positive, cytokeratin 20 negative, p63 negative, cytokeratin 5/6 focal dim positivity    LUNG BIOPSY 1/31/2018  Pathology revealed benign lung tissue         SPECIMEN  1) Right Lung Bx 2/14/2018  Supplemental Diagnosis  Immunohistochemical stains show strong nuclear staining for p63 in essentially all tumor cells and very strong  cytoplasmic and membrane staining for CK5/6, also in essentially all tumor cells. TTF-1 and CK7 are negative  within tumor cells but do stain native pulmonary elements present within the biopsy. A stain for mucicarmine is  negative. Positive and negative controls function appropriately.  Final diagnosis: Specimen submitted as right lung biopsy  -Squamous cell carcinoma  (Electronically Signed: 2018-02-20 09:12:07 )  Diagnosed by: Phong Tohmpson  FINAL PATHOLOGIC DIAGNOSIS  Fragments of pulmonary parenchyma (submitted as right lung biopsy):    FINAL PATHOLOGIC DIAGNOSIS 1. Lymph node, right axilla, biopsy, review of 10 outside slides [facility], JS 18 [phone],  collected January 11, 2018: Metastatic poorly differentiated carcinoma (see comment).  The histologic section shows fibrous stroma infiltrated by nest of poorly differentiated malignant cells including  occasional dyskeratotic cells morphologically suggestive of metastatic squamous cell carcinoma.  Immunohistochemical stains are nonspecific; the cells are positive for cytokeratin 7 and cytokeratin 5/6 (focal) and  negative for cytokeratin 20, TTF-1, p63, GCDFP, mammoglobin, and CEA        PET/CT 2/21/2019  Increased size and irregularity of the right axillary lymph node with increased FDG avidity.  Although this would be atypical in location for lung carcinoma, the increased size and avidity must be concerning for metastatic disease in this patient with history of squamous cell lung cancer.     US Rt breast and mammo 2/26/2019  Impression:  Right  Lymph  Node: Right axilla lymph node. Assessment: 4 - Suspicious finding. Biopsy is recommended.      BI-RADS Category:   Right: 4 - Suspicious  Overall: 4 - Suspicious     Recommendation:  Biopsy is recommended. Biopsy of the lymph node measuring 1.4 x 1.1 x 1.3 recommended , the one with the round shape configuration, corresponding to the most recent PET CT finding.         Pathology 3/11/2019   FINAL PATHOLOGIC DIAGNOSIS  Right axilla, mass, core biopsy:  Positive for poorly differentiated carcinoma.  See comment.  Comment:  The biopsy from the right axilla mass shows a poorly differentiated carcinoma in background lymphoid tissue  with enlarged cells showing increased nuclear size, prominent nucleoli, moderate amounts of cytoplasm and mitotic  figures. Immunostains are completed and reveal the tumor cells to stain positively with cytokeratin AE 1/AE3, CK  5/6 and CK 7. The tumor cells are negative for CK 20, Estrogen receptor, p63 and TTF-1. All stains have  satisfactory positive and negative controls. The patient's prior cases will be reviewed and an additional stain for  JUAREZ-3 is pending in an attempt to pinpoint the primary site of this malignancy and results will follow in a  supplemental report.      Supplemental Diagnosis  3/11/2019  The current axillary mass is compared to the patient's prior right lung biopsy (case number [id number]) and the  current axillary mass is morphologically similar to the lung malignancy. Also, the current axillary mass is compared  to the patient's prior breast resection (Case number [id number]) and appears similar as well. P63 is negative in the  [id number] tumor and CK 5/6 shows scattered positive staining in the [id number] tumor.  Immunostain for JUAREZ-3 is completed and shows only rare weak to moderate staining within the tumor cell nuclei  with satisfactory positive and negative controls. This stain is positive in the surrounding lymphocytes. This staining  pattern is non-specific and  does not definitively differentiate between a lung and breast primary malignancy in this  right axilla mass biopsy. The staining profile of the current axillary mass match more closely with the previous  breast carcinoma (due to the p63 negativity in both the 2014 breast cancer and the current axillary mass lesion but  p63 positivity in the lung mass from 2018).  This staining profile, together with the comparison with the patient's prior breast tumor and prior lung tumor supports  a diagnosis of poorly differentiated carcinoma and the malignancy appears morphologically similar to the prior  malignancies from both sites, but the staining profile in this small sample from the axillary mass more closely  correlates with the prior breast malignancy. Pathologic subclassification of this malignancy's primary location is not  definitive and clinical correlation is recommended definitively decide on possible primary location in this patient's  axillary mass sample.  Supplemental (2):  Additional immunohistochemical staining for progesterone receptor and HER2 are completed per clinician request  with following results:  Progesterone receptor: Negative; 0% nuclear tumor cell staining.  HER2: Negative; stain score = 0.  Supplemental (3):  [place] DIAGNOSIS:  FINAL DIAGNOSIS  Breast, right, needle core biopsy ([id number]; 06/17/2014): Invasive ductal carcinoma, [place] grade III (of  III), with focal micropapillary features.  Immunohistochemical stains performed at the referring institution show that the tumor cells have the following  phenotype: - Estrogen receptor: Negative (0% tumor cells staining). - Progesterone receptor: Negative (0% tumor  cells staining). - HER2: Negative (score 0).  Lymph nodes, right, sentinel biopsy and lumpectomy ([id number]; 07/11/2014):  1. Right sentinel lymph node: A single (1) lymph node is negative for metastatic carcinoma.  Immunohistochemical stains performed by the referring institution  and reviewed at [facility] for cytokeratin are  negative, confirming the diagnosis.  2. Right breast: Invasive ductal carcinoma with micropapillary features, per report 11.5 mm in greatest dimension.  Foci suspicious for lymphovascular invasion identified. Margins of resection are free of tumor.  Immunohistochemical stains performed by the referring institution and reviewed at [facility] show that the tumor  cells are negative for p63 and show patchy positivity for CK 5/6.  Lung, right, needle core biopsy ([id number]; 02/14/2018): Squamous cell carcinoma.    Immunohistochemical stains performed by the referring institution show the tumor cells are strongly positive for p63  and CK 5/6, while negative for TTF-1 and CK7. These result support the diagnosis. Axilla, right, needle core biopsy  ([id number]; 03/11/2019): Lymph node positive for metastatic carcinoma, most consistent with breast primary  (see comment).  Immunohistochemical stains performed by the referring institution and reviewed at [facility] show that the tumor  cells are negative for p63, focally positive for CK 5/6, focally and weakly positive for JUAREZ-3, and strongly positive  for CK7. They are also negative for estrogen receptor, progesterone receptor, and HER2 JAM (score 0).  COMMENT:  Thank you for allowing me to review the case of this 73-year-old lady who recently underwent needle core biopsies  of a right axillary mass and who has a previous diagnosis of an invasive ductal carcinoma of the right breast, as well  as a squamous cell carcinoma of the lung. I am reviewing this case because of my special interest in breast  pathology.  I agree with your interpretation of this case. In my opinion, the lung tumor corresponds to a squamous cell  carcinoma and appears to be unrelated to the invasive ductal carcinoma of the breast. The axillary mass, in my  opinion, corresponds to metastases of the breast primary. Although it is a poorly differentiated  carcinoma, the  immunophenotype is most consistent with the one that the breast primary exhibited, and is inconsistent with a  metastatic squamous cell carcinoma. I did share the case with Dr. Anabel Stone, one of our pulmonary pathologists,  and she concurs with this interpretation.  Note: Report attached.  Performing location:  [address] [address]      LYMPH NODE, RIGHT AXILLA, BIOPSY: 6/16/21   - Metastatic poorly-differentiated carcinoma, consistent with breast primary  -ER, MN, and HER2 immunohistochemical hormonal markers are also negative  PD-L1 (22C3) SemiQuant IHC, Manual  Interpretation  Right axillary lymph node , specimen for PD-L1 immunohistochemistry studies (clone 22C3, [facility]  [employer], [place], [place]; using a proprietary detection system) ([id number]):  Reported carcinoma site of origin: Breast  Result: The percent of PD-L1 positive cells based upon the total number of tumor cells (combined positive  score, CPS) is greater than or equal to 10.  Interpretation: Studies suggest that positive PD-L1 immunohistochemistry in tumor cells and/or tumorassociated  immune cells may predict tumor response to therapy with immune checkpoint inhibitors. This  result should not be used as the sole factor in determining treatment, as other factors (for example, tumor  mutation burden, and microsatellite instability) have been also studied as predictive markers.          CT neck/chest/abd/pelvis w/contrast 4/26/2019   The previously described right hilar mass is no longer visible.  There is persistent volume loss in the right middle lobe this appears similar to the prior PET-CT February 21, 2019.    Persistent abnormal right axillary node today measuring about 15 mm compared 18 mm prior.  The positioning of the adjacent nodes is slightly different likely accounting for the slight difference in measurement.    Cluster of tree-in-bud nodular  densities left lower lobe series 2, image 93.  This may be related to small airways disease though serial follow-up suggested.      PET/CT 6/20/2019   New and worsening hypermetabolic lymph nodes concerning for metastatic breast cancer as described above.  Tissue sampling would be required for definitive diagnosis.      2-D echo 6/27/2019   Normal left ventricular systolic function. The estimated ejection fraction is 55%  Concentric left ventricular remodeling.  Normal LV diastolic function.  Normal right ventricular systolic function.  Moderate left atrial enlargement.  Mild right atrial enlargement.  Mild aortic regurgitation.  Mild mitral regurgitation.  Mild tricuspid regurgitation.  Normal central venous pressure (3 mm Hg).  The estimated PA systolic pressure is 25 mm Hg      PET/CT 9/19/2019   Favorable response to therapy with interval decrease in size and uptake of several right axillary lymph nodes and a supraclavicular lymph node.  Mild residual activity may indicate viable tumor.    No new hypermetabolic findings worrisome for malignancy.    PET/CT 2/28/2020  1.  No evidence of hypermetabolic tumor.  Continued improvement of the right axilla status post adjuvant radiation.    2.  No new hypermetabolic lesions      CTA 6/17/2020  1. No evidence of pulmonary embolus. No aortic dissection.   2. There is no evidence for new or progressive disease in the chest as compared to PET/CT 6/20/2019 in this patient with history of right breast cancer and right lung neoplasm. The patient does have a more recent PET/CT 2/28/2020 from an outside facility per the electronic medical record although images are not available for direct comparison. Correlation with these images may be beneficial. Continued long-term surveillance recommended.  3. Stable appearance of the treated tumor in the right hilum/middle lobe.  4. Stable treated right breast cancer and axillary adenopathy without evidence for developing breast mass or  new right axillary adenopathy.  5. Stable tiny nodularity/tree-in-bud densities in the left lung, probably postinfectious or inflammatory.  6. Wall thickening of the esophagus may be correlated for esophagitis. Possible tiny hiatus hernia.  7. Slight bronchial wall thickening may be correlated for bronchitis.  8. Mild to moderate emphysema as before.  9. Reflux of contrast into the IVC and hepatic veins is nonspecific but may be correlated for elevated right heart pressures.  10. Coronary calcifications and/or stents similar to prior.    Echo 5/21/2020  Technically difficult study.   Normal left ventricular systolic function.   Left ventricular ejection fraction is estimated at 55%.   Severe mitral annular calcification.   Mild mitral valve regurgitation.   Aortic valve sclerosis without stenosis or regurgitation.     PFTs 6/17/2020  Spirometry reveals a very severe obstructive lung defect, which does not significantly improve post bronchodilators. Lung volumes revealed air trapping. Diffusing capacity was moderately impaired.        Del 6/26/2020  BI-RADS Category:   Overall: 2 - Benign      PET/CT 10/23/2020   Impression:     Stable mildly hypermetabolic right axillary lymph node/nodular density contralateral to the site of injection.  Differential considerations include metastatic lymph node, reactive lymph node from benign right upper extremity process, and fat necrosis.  Recommend physical examination of the upper extremity and consideration of ultrasound for further evaluation of the node/nodule and possible image guided biopsy.  Otherwise, attention on follow-up.     Otherwise, no other hypermetabolic disease identified      Mammo diagnostic right /US 11/4/2020   Impression:   1. No suspicious finding either on mammogram or ultrasound at the site of the palpable lump in the right breast at 10:00 o'clock. A bilateral mammogram is recommended in June 2020 to return to the patient to annual screening.   2.  Redemonstration of the morphologically abnormal lymph node in the right axilla that contains a biopsy clip, compatible with the known recurrence metastatic breast cancer that has been treated with chemotherapy. The appearance of this lymph node is unchanged from 06/26/2020 and overall appears smaller when compared to 03/11/2019.     Abd US 12/11/2020   Impression:     1. Echogenic liver likely representing hepatic steatosis.  2. Right upper quadrant ultrasound otherwise is unremarkable.     PET/CT 10/14/21     Impression:     1.  No definite evidence of hypermetabolic tumor.  Interval resolution of hypermetabolic right axillary lymph nodes.  No new hypermetabolic findings.     2.  Persistent low-grade diffuse cutaneous uptake which is nonspecific.    MRI shoulder w w/out contrast 1/26/22   Impression:     Mild edema and postcontrast enhancement at the myotendinous junction of the supraspinatus and infraspinatus, it is nonspecific and could be due to degenerative change or tendinosis.     Small area of interstitial tear at the footprint insertion of the infraspinatus tendon.     No large rotator cuff tear.     No advanced degenerative change.     No osseous lesions.     PET/CT 1/26/22  Impression:In this patient with breast cancer, there is no evidence of hypermetabolic tumor to suggest recurrent or metastatic disease.   Less extensive but, nonspecific, low-grade diffuse cutaneous uptake of the right breast.       PET/CT 4/27/22  Impression:     1.  No FDG avid disease to suggest recurrence or metastatic disease.     Unchanged right breast skin thickening with mild FDG uptake.       PET/CT 7/13/22   Impression:     In this patient with lung and breast cancer, there is new left lower lobe opacification with mild radiotracer uptake which may represent aspiration, pneumonia, or malignancy.  Tissue sampling would be required for definitive diagnosis.     Unchanged right breast skin thickening with mild radiotracer  uptake.    CT chest w/contrast 8/25/22    Decreasing patchy pulmonary consolidation within the left lower lobe when compared to prior PET-CT scan dated 07/13/2022.  The overall appearance of the patchy consolidation as well as the moderate improvement when compared to the prior study suggest a benign etiology such as left lower lobe pneumonia.     Persistent band of atelectasis/scarring within the right middle lobe, stable dating back to 04/26/2019.     Coronary artery atherosclerosis in a multi vessel distribution.     There are no measurable lesions per RECIST criteria.    PET /CT  11/21/22 shows New right lower lobe infiltrate worrisome for pneumonia or aspiration.Chronic infiltrate right middle lobe.   Resolution of the left lower lobe infiltrate.    Mammo 7/26/22  BI-RADS Category:   Overall: 2 - Benign        CT chest with contrast 11/21/22    Impression:     New right lower lobe infiltrate worrisome for pneumonia or aspiration.     Chronic infiltrate right middle lobe.     Resolution of the left lower lobe infiltrate.     Coronary artery calcifications.         PET/CT 1/11/23  Impression:     1. In this patient with lung and breast cancer, there is stable right breast skin thickening with mild radiotracer uptake.  2. Interval resolution of hypermetabolic opacification in the left lower lobe as compared to FDG PET-CT 07/14/2022.  Interval improvement in patchy opacities in the right lower lobe as compared to CT 11/21/2022    .                Electronically Signed By: Tapan Young MD 4/16/2023 23:17 CDT, [facility]  Narrative    PET/CT 4/14/23  [facility]  HISTORY:       Malignant neoplasm of female breast, unspecified estrogen receptor status, unspecified laterality, unspecified site of breast (CMS/HCC).       ICD10:  C50.919   Malignant neoplasm of female breast, unspecified estrogen receptor status, unspecified laterality, unspecified site of breast (CMS/HCC)       REFERENCE EXAMS:      7/13/2022 PET CT ([facility]) (no images, report only)     6/20/2019 PET CT ([facility])       TECHNIQUE:     PET/CT with attenuation correction.     Dose:  13.62 mCi of FDG-18     Injection site:  left wrist     Field of view:  Skull base to thighs.     Arm position:  above head     Baseline glucose:  128 mg/dL       FINDINGS:       All SUV values are max SUV.     Head/Neck:     Physiologic uptake of radiotracer.         Thorax:     Right portacath.       Cutaneous thickening in the right breast (SUV=1.58).       Calcification of the mitral valve, similar to 6/20/2019.       Coronary artery atherosclerotic calcification.     Atelectasis/scarring along the inferior aspect of the right major fissure, similar to 6/20/2019.         Abdomen/Pelvis:     Physiologic uptake in the genitourinary system.     Physiologic uptake in the intestines.       Patchy atherosclerotic calcification in the aorta and iliac arteries.       Impression      No opacity in the left lower lobe on the current exam to correspond to a left lower lobe opacity described on the 7/13/2022 PET CT report.  Images from the 7/13/2022 PET CT are not available at the time of this report.       Cutaneous thickening in the right breast with mild uptake.  There was a similar finding described on the 71/3/2022 PET CT report.           Electronically Signed By: Tapan Young MD 4/16/2023 23:17 CDT, [facility]    Assessment:       1. Recurrent breast cancer, unspecified laterality    2. History of lung cancer    3. Chronic obstructive pulmonary disease, unspecified COPD type    4. LOGAN (dyspnea on exertion)    5. Chronic heart failure with preserved ejection fraction                    Plan:     1.,2.   Pt with hx of Stage 1A invasive ductal carcinoma rt breast s/p rt segmental mastectomy and SLN bx 7/11/2014 ER/SD neg HER 2 jose maria neg [id number](i-).  S/p adjuvant RT completed 9/2014 Pt declined adjuvant chemo  Pt  hospitalized 11/2017 with  acute-on-chronic  resp failure  Abnormal CT imaging revealing Large right hilar mass with involvement of  adjacent segmental pulmonary arterial branches and bronchi with associated postobstructive atelectasis  and involvement of mediastinal and axillary adenopathy   S/p rt axillary LN bx ( outside facility) - metastatic poorly differentiated carcinoma of unknown primary  site  status post  lung biopsy 1/31/2017 -benign lung tissue  PET/CT 1/26/2018   Intense FDG uptake within the known right infrahilar lung mass, compatible with malignancy.   Intensely hypermetabolic right axillary, retropectoral, and lower right cervical lymph nodes, compatible with metastases.  Abnormal FDG uptake along right breast skin thickening, new from prior chest CTA and mammogram.  Follow up mammo today  Recent PET 4/2023 outside facility- no  evid of disease recurrence    Repeat lung bx 2/14/2018 revealed Squamous Cell CA lung PD-L1 10% EGFR NEGALK NEG  Pt treated for advanced squamous cell CA of lung She s/p  cycle 6 of carboplatin/Abraxane Day1 completed 7/2/2018 ( day 15 held due to prolonged cytopenias)  She completed therapy with near CR     PET/CT 2/21/2019 showed increased size and irregularity of the right axillary lymph node with increased FDG avidity     MAMMO/US rt breast 3/2019  reveals suspicious findings rt axilla LN.  Biopsy of the lymph node measuring 1.4 x 1.1 x 1.3     Pt s/p  rt axillary LN bxPositive for poorly differentiated carcinoma.- likely breast primary ERneg PRneg Her 2 neg    Outside review of specimen(s) revealed  lung tumor corresponds to a squamous cell carcinoma and appears to be unrelated to the invasive ductal carcinoma of the breast. It was determined The axillary mass, in my opinion, corresponded  to metastases of the breast primary. Although it is a poorly differentiated carcinoma, theimmunophenotype is most consistent with the one that the breast primary exhibited, and is inconsistent with a  metastatic squamous cell carcinoma.     CT imaging 4/26/2019 persistent rt axillary LAD, no other sites of disease     Lymph node biopsy 3/11/2019 Right axilla, mass, core biopsy:Positive for poorly differentiated carcinoma.favor breast primary     PET/CT 6/20/2019  New and worsening hypermetabolic lymph nodes concerning for metastatic breast cancer     Previously Discussed  imaging findings in detail with patient which reveals new and worsening hypermetabolic melena metabolic lymph nodes including hypermetabolic supraclavicular node.  Therefore, at treatment option will be systemic chemotherapy in light of the recent imaging findings.  She will be followed by her surgical oncologist Dr. Christopher      IT was determined to proceed with systemic chemotherapy   S/p  AC [id number] x 3 wks x 4 wks completed 9/6/2019   ( pt has not received in past)      PET/CT 9/19/2019 shows disease response with interval decrease in size and uptake of several right axillary lymph nodes and a supraclavicular lymph node.  Mild residual activity may indicate viable tumor.No new hypermetabolic findings worrisome for malignancy.    Pt followed by  Breast Surgery and plan was  for possible   ALND. Pt with Recurrent cancer in her right axilla and right supraclavicular fossa.   She completed chemotherapy 9/6/2019 with near CR  It was determined  Radiation will be given to the original areas of hypermetabolic activity in the right axilla and right supraclavicular region    Pt completed  RT 12/16/2019     PET/CT 1/14/21 shows   New right axillary hypermetabolic lymph nodes with preserved normal architecture suggestive of reactive nodes.  Stable appearing previously identified hypermetabolic lymph node with mild increase in surrounding fat stranding.        Findings previously d/w with treating breast surgeon and it was determined to cont to monitor and close f/u as pt is heavily pre treated, has received RT to site   Pt acknowledged understanding and  agreeable with plan     PET/CT imaging  5/20/21 shows Continued increase in both size and hypermetabolic activity of right axillary level 1 lymph nodes a new, mildly hypermetabolic cutaneous thickening of the right breast.     Right breast, skin, punch biopsy:Negative for carcinoma    LYMPH NODE, RIGHT AXILLA, BIOPSY: 6/16/21   - Metastatic poorly-differentiated carcinoma, consistent with breast primary triple neg  PD-L1 immunohistochemistry studies(combined positive score, CPS) is greater than or equal to 10   PET/CT showed  No definite evidence of hypermetabolic tumor.  Interval resolution of hypermetabolic right axillary lymph nodes.  No new hypermetabolic findings.      S/p C16 pembrolizumab 6/15/22   Last  PET/CT imaging shows  new left lower lobe opacification with mild radiotracer uptake which may represent aspiration, pneumonia, or malignancy.  Recent follow-up imaging studies decreasing patchy pulmonary consolidation within the left lower lobe when compared to prior PET-CT scan dated 07/13/2022.  Plan to remain off of therapy   Follow-up PET/CT imaging 1/11/23 Interval resolution of hypermetabolic opacification in the left lower lobe as compared to FDG PET-CT 07/14/2022.  Interval improvement in patchy opacities in the right lower lobe as compared to CT 11/21/2022   follow up PET imaging -  performed at [facility] .([facility] was not available at  since broken )No opacity in the left lower lobe on the current exam to correspond to a left lower lobe opacity described on the 7/13/2022 PET CT report.  Images from the 7/13/2022 PET CT are not available at the time of this report.         3. F/b Pulmonology  Pt on supp 02 at night   Cont MDI and O2 as prescribed       4. . 5  stable  F/b Cardiology    6. F/u  GI         Cbc,cmp, Mag,  prior to follow-up 2mos  Mammo today   PAC FLUSH today     Advance Care Planning     Power of   I previously  initiated the process of advance care planning today and  explained the importance of this process to the patient.  I introduced the concept of advance directives to the patient, as well. Then the patient received detailed information about the importance of designating a Health Care Power of  (HCPOA). She was also instructed to communicate with this person about their wishes for future healthcare, should she become sick and lose decision-making capacity. The patient has previously appointed a HCPOA. After our discussion, the patient has decided to complete a HCPOA and has appointed her son and niece and  I spent a total time of 16 minutes discussing this issue with the patient.         Cc: Swetha Katz M.D.         MD Ranjan Roberson MD

## 2023-08-24 ENCOUNTER — TELEPHONE (OUTPATIENT)
Dept: HEMATOLOGY/ONCOLOGY | Facility: CLINIC | Age: 78
End: 2023-08-24
Payer: MEDICARE

## 2023-08-24 NOTE — TELEPHONE ENCOUNTER
TC rec'd from pt c/o pain in her neck and shoulder    She denies any injury Advised her to see PCP to be evaluated due to her history She acknowledged understanding

## 2023-09-15 ENCOUNTER — TELEPHONE (OUTPATIENT)
Dept: GASTROENTEROLOGY | Facility: CLINIC | Age: 78
End: 2023-09-15
Payer: MEDICARE

## 2023-09-15 NOTE — TELEPHONE ENCOUNTER
----- Message from Unruly Bharat sent at 9/15/2023 10:25 AM CDT -----  Regarding: Self  [phone]  Type: Patient Call Back    Who called: Self     What is the request in detail: called to get more information on the pt's upcoming procedure. Would like a call back.     Can the clinic reply by [facility]? No     Would the patient rather a call back or a response via [facility]? Call back     Best call back number: [phone]     Additional Information:    Thank you.

## 2023-09-18 ENCOUNTER — ANESTHESIA EVENT (OUTPATIENT)
Dept: ENDOSCOPY | Facility: HOSPITAL | Age: 78
End: 2023-09-18
Payer: MEDICARE

## 2023-09-19 ENCOUNTER — HOSPITAL ENCOUNTER (OUTPATIENT)
Facility: HOSPITAL | Age: 78
Discharge: HOME | End: 2023-09-19
Attending: INTERNAL MEDICINE | Admitting: INTERNAL MEDICINE
Payer: MEDICARE

## 2023-09-19 ENCOUNTER — ANESTHESIA (OUTPATIENT)
Dept: ENDOSCOPY | Facility: HOSPITAL | Age: 78
End: 2023-09-19
Payer: MEDICARE

## 2023-09-19 VITALS
RESPIRATION RATE: 18 BRPM | HEART RATE: 75 BPM | OXYGEN SATURATION: 97 % | SYSTOLIC BLOOD PRESSURE: 130 MMHG | TEMPERATURE: 98 F | DIASTOLIC BLOOD PRESSURE: 75 MMHG

## 2023-09-19 DIAGNOSIS — R13.19 ESOPHAGEAL DYSPHAGIA: Primary | ICD-10-CM

## 2023-09-19 DIAGNOSIS — K22.0 ACHALASIA OF CARDIA: ICD-10-CM

## 2023-09-19 PROCEDURE — D9220A PRA ANESTHESIA: Mod: ANES,,, | Performed by: ANESTHESIOLOGY

## 2023-09-19 PROCEDURE — 43239 EGD BIOPSY SINGLE/MULTIPLE: CPT | Mod: HCNC | Performed by: INTERNAL MEDICINE

## 2023-09-19 PROCEDURE — 27201012 HC FORCEPS, HOT/COLD, DISP: Mod: HCNC | Performed by: INTERNAL MEDICINE

## 2023-09-19 PROCEDURE — 43236 UPPR GI SCOPE W/SUBMUC INJ: CPT | Mod: 59,HCNC | Performed by: INTERNAL MEDICINE

## 2023-09-19 PROCEDURE — 37000009 HC ANESTHESIA EA ADD 15 MINS: Mod: HCNC | Performed by: INTERNAL MEDICINE

## 2023-09-19 PROCEDURE — 63600175 PHARM REV CODE 636 W HCPCS: Mod: JZ,JG,HCNC | Performed by: INTERNAL MEDICINE

## 2023-09-19 PROCEDURE — 25000003 PHARM REV CODE 250: Mod: HCNC | Performed by: NURSE ANESTHETIST, CERTIFIED REGISTERED

## 2023-09-19 PROCEDURE — 43239 PR EGD, FLEX, W/BIOPSY, SGL/MULTI: ICD-10-PCS | Mod: HCNC,,, | Performed by: INTERNAL MEDICINE

## 2023-09-19 PROCEDURE — 88305 TISSUE EXAM BY PATHOLOGIST: CPT | Mod: HCNC | Performed by: STUDENT IN AN ORGANIZED HEALTH CARE EDUCATION/TRAINING PROGRAM

## 2023-09-19 PROCEDURE — 43236 UPPR GI SCOPE W/SUBMUC INJ: CPT | Mod: 59,HCNC,, | Performed by: INTERNAL MEDICINE

## 2023-09-19 PROCEDURE — 27201028 HC NEEDLE, SCLERO: Mod: HCNC | Performed by: INTERNAL MEDICINE

## 2023-09-19 PROCEDURE — D9220A PRA ANESTHESIA: ICD-10-PCS | Mod: ANES,,, | Performed by: ANESTHESIOLOGY

## 2023-09-19 PROCEDURE — 88305 TISSUE EXAM BY PATHOLOGIST: ICD-10-PCS | Mod: 26,HCNC,, | Performed by: STUDENT IN AN ORGANIZED HEALTH CARE EDUCATION/TRAINING PROGRAM

## 2023-09-19 PROCEDURE — 37000008 HC ANESTHESIA 1ST 15 MINUTES: Mod: HCNC | Performed by: INTERNAL MEDICINE

## 2023-09-19 PROCEDURE — 43239 EGD BIOPSY SINGLE/MULTIPLE: CPT | Mod: HCNC,,, | Performed by: INTERNAL MEDICINE

## 2023-09-19 PROCEDURE — D9220A PRA ANESTHESIA: Mod: CRNA,,, | Performed by: NURSE ANESTHETIST, CERTIFIED REGISTERED

## 2023-09-19 PROCEDURE — 88305 TISSUE EXAM BY PATHOLOGIST: CPT | Mod: 26,HCNC,, | Performed by: STUDENT IN AN ORGANIZED HEALTH CARE EDUCATION/TRAINING PROGRAM

## 2023-09-19 PROCEDURE — 43236 PR EGD, FLEX, W/SUBMUC INJECTION(S), ANY SUBSTANCE: ICD-10-PCS | Mod: 59,HCNC,, | Performed by: INTERNAL MEDICINE

## 2023-09-19 PROCEDURE — 63600175 PHARM REV CODE 636 W HCPCS: Mod: HCNC | Performed by: NURSE ANESTHETIST, CERTIFIED REGISTERED

## 2023-09-19 PROCEDURE — D9220A PRA ANESTHESIA: ICD-10-PCS | Mod: CRNA,,, | Performed by: NURSE ANESTHETIST, CERTIFIED REGISTERED

## 2023-09-19 RX ORDER — PHENYLEPHRINE HYDROCHLORIDE 10 MG/ML
INJECTION INTRAVENOUS
Status: DISCONTINUED | OUTPATIENT
Start: 2023-09-19 | End: 2023-09-19

## 2023-09-19 RX ORDER — LIDOCAINE HYDROCHLORIDE 20 MG/ML
INJECTION INTRAVENOUS
Status: DISCONTINUED | OUTPATIENT
Start: 2023-09-19 | End: 2023-09-19

## 2023-09-19 RX ORDER — LIDOCAINE HYDROCHLORIDE 40 MG/ML
SOLUTION TOPICAL
Status: DISCONTINUED | OUTPATIENT
Start: 2023-09-19 | End: 2023-09-19

## 2023-09-19 RX ORDER — PROPOFOL 10 MG/ML
VIAL (ML) INTRAVENOUS
Status: DISCONTINUED | OUTPATIENT
Start: 2023-09-19 | End: 2023-09-19

## 2023-09-19 RX ORDER — SODIUM CHLORIDE 9 MG/ML
INJECTION, SOLUTION INTRAVENOUS CONTINUOUS
Status: DISCONTINUED | OUTPATIENT
Start: 2023-09-19 | End: 2023-09-19 | Stop reason: HOSPADM

## 2023-09-19 RX ORDER — LIDOCAINE HYDROCHLORIDE 20 MG/ML
INJECTION, SOLUTION EPIDURAL; INFILTRATION; INTRACAUDAL; PERINEURAL
Status: DISCONTINUED
Start: 2023-09-19 | End: 2023-09-19 | Stop reason: HOSPADM

## 2023-09-19 RX ORDER — PHENYLEPHRINE HCL IN 0.9% NACL 1 MG/10 ML
SYRINGE (ML) INTRAVENOUS
Status: DISCONTINUED
Start: 2023-09-19 | End: 2023-09-19 | Stop reason: HOSPADM

## 2023-09-19 RX ORDER — PROPOFOL 10 MG/ML
INJECTION, EMULSION INTRAVENOUS
Status: DISCONTINUED
Start: 2023-09-19 | End: 2023-09-19 | Stop reason: HOSPADM

## 2023-09-19 RX ORDER — LIDOCAINE HYDROCHLORIDE 10 MG/ML
1 INJECTION, SOLUTION EPIDURAL; INFILTRATION; INTRACAUDAL; PERINEURAL ONCE
Status: DISCONTINUED | OUTPATIENT
Start: 2023-09-19 | End: 2023-09-19 | Stop reason: HOSPADM

## 2023-09-19 RX ADMIN — PROPOFOL 10 MG: 10 INJECTION, EMULSION INTRAVENOUS at 02:09

## 2023-09-19 RX ADMIN — ONABOTULINUMTOXINA 100 UNITS: 100 INJECTION, POWDER, LYOPHILIZED, FOR SOLUTION INTRADERMAL; INTRAMUSCULAR at 03:09

## 2023-09-19 RX ADMIN — SODIUM CHLORIDE: 0.9 INJECTION, SOLUTION INTRAVENOUS at 02:09

## 2023-09-19 RX ADMIN — PHENYLEPHRINE HYDROCHLORIDE 100 MCG: 10 INJECTION INTRAVENOUS at 03:09

## 2023-09-19 RX ADMIN — PROPOFOL 60 MG: 10 INJECTION, EMULSION INTRAVENOUS at 02:09

## 2023-09-19 RX ADMIN — LIDOCAINE HYDROCHLORIDE 3 ML: 40 SOLUTION TOPICAL at 02:09

## 2023-09-19 RX ADMIN — LIDOCAINE HYDROCHLORIDE 75 MG: 20 INJECTION, SOLUTION INTRAVENOUS at 02:09

## 2023-09-19 RX ADMIN — PROPOFOL 10 MG: 10 INJECTION, EMULSION INTRAVENOUS at 03:09

## 2023-09-19 NOTE — ANESTHESIA PREPROCEDURE EVALUATION
09/19/2023  Ade Castellano is a 77 y.o., female.      Pre-op Assessment    I have reviewed the Patient Summary Reports.     I have reviewed the Nursing Notes.       Review of Systems  Anesthesia Hx:  No problems with previous Anesthesia  Denies Family Hx of Anesthesia complications.   Denies Personal Hx of Anesthesia complications.   Social:  Former Smoker, No Alcohol Use    Cardiovascular:   Hypertension Valvular problems/Murmurs, MR Past MI CAD  CABG/stent  Angina LOGAN 06/2023 Echo:  ? The left ventricle is normal in size with normal systolic function.  ? The estimated ejection fraction is 55%.  ? Mild left atrial enlargement.  ? Grade III left ventricular diastolic dysfunction.  ? Normal right ventricular size with normal right ventricular systolic function.  ? Mild right atrial enlargement.  ? Moderate mitral regurgitation.  ? Normal central venous pressure (3 mmHg).  ? The estimated PA systolic pressure is 40 mmHg.  ? There is moderate pulmonary hypertension   Pulmonary:   Pneumonia COPD Shortness of breath Sleep Apnea Uses oxygen prn; last used albuterol 2 days ago   Renal/:  Renal/ Normal     Hepatic/GI:   PUD,    Neurological:   Neuromuscular Disease,    Endocrine:  Endocrine Normal        Physical Exam  General: Cooperative, Oriented, Alert and Well nourished    Airway:  Mallampati: III   Mouth Opening: Small, but > 3cm  TM Distance: 4 - 6 cm  Tongue: Normal  Neck ROM: Normal ROM    Dental:  Dentures, Partial Dentures    Chest/Lungs:  Normal Respiratory Rate, Clear to auscultation    Heart:  Rate: Normal  Rhythm: Regular Rhythm      Wt Readings from Last 3 Encounters:   08/07/23 74.6 kg (164 lb 7.4 oz)   07/10/23 71.7 kg (158 lb)   06/21/23 72.3 kg (159 lb 8 oz)     Temp Readings from Last 3 Encounters:   08/07/23 36.9 °C (98.5 °F)   07/10/23 36.4 °C (97.5 °F)   06/07/23 36.4 °C (97.6 °F)      BP Readings from Last 3 Encounters:   08/07/23 (!) 165/75   08/07/23 (!) 142/82   07/10/23 (!) 142/78     Pulse Readings from Last 3 Encounters:   08/07/23 (!) 50   08/07/23 (!) 56   07/10/23 (!) 52         Anesthesia Plan  Type of Anesthesia, risks & benefits discussed:    Anesthesia Type: Gen Natural Airway, MAC  Intra-op Monitoring Plan: Standard ASA Monitors  Post Op Pain Control Plan: multimodal analgesia  Induction:  IV  Informed Consent: Informed consent signed with the Patient and all parties understand the risks and agree with anesthesia plan.  All questions answered.   ASA Score: 3  Day of Surgery Review of History & Physical: H&P Update referred to the surgeon/provider.    Ready For Surgery From Anesthesia Perspective.     .

## 2023-09-19 NOTE — H&P
Short Stay Endoscopy   Pre-Procedure Note    PCP - Jeannine Huitron MD  Referring Physician - Miller Phillips MD  [address] [place] [address]    Procedure - Endoscopy    ASA - per anesthesia  Mallampati - per anesthesia    HPI  77 y.o. female scheduled for endoscopy for evaluation of    1. Esophageal dysphagia    2. Achalasia of cardia         Medical History:  has a past medical history of Abnormal stress test (8/5/2020), Acute respiratory failure with hypoxia and hypercapnia (11/29/2017), Angina pectoris, Arthritis, Bell's palsy, Breast cancer, CAD (coronary artery disease), Cervical cancer, Chronic bronchitis, Chronic heart failure with preserved ejection fraction (8/5/2020), Chronic heart failure with preserved ejection fraction (8/5/2020), COPD (chronic obstructive pulmonary disease), Dental bridge present, Emphysema of lung, H/O colonoscopy (06/2017), History of Bell's palsy, History of heart artery stent, Hyperlipidemia, Hypertension, Lung cancer, Myocardial infarction, SUNITHA (obstructive sleep apnea), SUNITHA (obstructive sleep apnea), Pneumonia, Pneumonia due to other staphylococcus, PUD (peptic ulcer disease), Severe anemia (6/11/2018), Sleep apnea, and Vaginal delivery.    Surgical History:  has a past surgical history that includes Cervix surgery; Breast surgery; Tonsillectomy; Adenoidectomy; sweat glands axillary regions (Bilateral); Colonoscopy (N/A, 3/17/2017); Breast biopsy (Right); Colonoscopy (N/A, 6/30/2017); Portacath placement (Right, 01/2018); Colonoscopy (N/A, 11/28/2018); Colonoscopy (N/A, 1/29/2019); Eye surgery (Bilateral, 06/08/2018); Breast lumpectomy; Left heart catheterization (Left, 8/13/2020); Esophagogastroduodenoscopy (N/A, 1/25/2021); Coronary angioplasty with stent; Colonoscopy (N/A, 7/26/2022); Esophagogastroduodenoscopy (N/A, 11/8/2022); and Esophageal manometry with measurement of impedance (N/A, 7/10/2023).    Family History: family history includes Breast  cancer in her mother; COPD in her sister; Cancer in her father, maternal grandfather, maternal grandmother, mother, paternal grandfather, paternal grandmother, sister, and sister; Heart attack in her sister; Heart disease in her maternal grandfather, maternal grandmother, mother, and sister; Kidney cancer in her son..    Social History:  reports that she quit smoking about 5 years ago. Her smoking use included cigarettes. She started smoking about 55 years ago. She has a 12.5 pack-year smoking history. She has never used smokeless tobacco. She reports that she does not drink alcohol and does not use drugs.    Review of patient's allergies indicates:  No Known Allergies    Medications:   Medications Prior to Admission   Medication Sig Dispense Refill Last Dose    acetaminophen (TYLENOL) 650 MG TbSR Take 650 mg by mouth every 8 (eight) hours.   Past Week    albuterol (PROVENTIL) 2.5 mg /3 mL (0.083 %) nebulizer solution NEBULIZE 1 vial EVERY 6 HOURS AS NEEDED FOR WHEEZING 540 mL 1 9/18/2023    albuterol (VENTOLIN HFA) 90 mcg/actuation inhaler INHALE 2 PUFF(S) BY MOUTH EVERY 4 - 6 HOURS AS NEEDED FOR SHORTNESS OF BREATH or WHEEZING 18 g 5 9/18/2023    aspirin (ECOTRIN) 81 MG EC tablet Take 81 mg by mouth once daily.    Past Week    carboxymethylcellulose (REFRESH PLUS) 0.5 % Dpet 1 drop 3 (three) times daily as needed.   9/18/2023    fluticasone propionate (FLONASE) 50 mcg/actuation nasal spray SHAKE WELL & USE TWO SPRAYS EACH NOSTRIL ONCE A DAY 48 g 1 9/18/2023    heparin, porcine, PF, (HEPARIN FLUSH 100 UNITS/ML) 100 unit/mL Syrg    Past Month    isosorbide mononitrate (IMDUR) 30 MG 24 hr tablet TAKE 1 TABLET BY MOUTH EVERY DAY. Hold if SBP <120 30 tablet 11 9/19/2023    levocetirizine (XYZAL) 5 MG tablet Take 1 tablet (5 mg total) by mouth every evening. 30 tablet 11 Past Week    meclizine (ANTIVERT) 25 mg tablet take 1 TABLET(S) BY MOUTH TWICE DAILY AS NEEDED for dizziness 30 tablet 2 Past Month    metoprolol  tartrate (LOPRESSOR) 25 MG tablet TAKE 1 TABLET BY MOUTH TWICE A DAY FOR BLOOD PRESSURE 180 tablet 2 9/19/2023    pantoprazole (PROTONIX) 40 MG tablet Take 1 tablet (40 mg total) by mouth once daily. 90 tablet 3 9/18/2023    rosuvastatin (CRESTOR) 10 MG tablet Take 1 tablet (10 mg total) by mouth once daily. 30 tablet 11 9/19/2023    TRELEGY ELLIPTA 100-62.5-25 mcg DsDv INHALE ONE PUFF INTO THE LUNGS ONCE A DAY  5 9/18/2023    isosorbide mononitrate (IMDUR) 30 MG 24 hr tablet Take 1 tablet (30 mg total) by mouth once daily. Hold if SBP <120 30 tablet 11     nitroGLYCERIN (NITROSTAT) 0.4 MG SL tablet PLACE 1 TAB UNDER TONGUE EVERY 5 MINUTES x 3 DOSES AS NEEDED FOR CHEST PAIN. IF NOT RESOLVED CALL 911 90 tablet 0 More than a month    sodium chloride (OCEAN) 0.65 % nasal spray 1 spray by Nasal route as needed for Congestion.            Labs:  Lab Results   Component Value Date    WBC 4.66 08/07/2023    HGB 14.0 08/07/2023    HCT 43.5 08/07/2023     08/07/2023    CHOL 138 10/17/2022    TRIG 113 10/17/2022    HDL 60 10/17/2022    ALT 15 08/07/2023    AST 18 08/07/2023     08/07/2023    K 4.0 08/07/2023     08/07/2023    CREATININE 1.0 08/07/2023    BUN 15 08/07/2023    CO2 21 (L) 08/07/2023    TSH 2.787 01/31/2023    INR 1.0 09/18/2020    HGBA1C 5.9 (H) 04/07/2022       Risks and benefits of this endoscopic procedure, including but not limited to bleeding, inflammation, infection, perforation, and death, explained to the patient prior to procedure      Miller Phillips MD

## 2023-09-19 NOTE — PROVATION PATIENT INSTRUCTIONS
Discharge Summary/Instructions after an Endoscopic Procedure  Patient Name: Ade Castellano  Patient MRN: [id number]  Patient YOB: 1945  Tuesday, September 19, 2023  Miller Phillips MD  Dear patient,  As a result of recent federal legislation (The Federal Cures Act), you may   receive lab or pathology results from your procedure in your [facility]   account before your physician is able to contact you. Your physician or   their representative will relay the results to you with their   recommendations at their soonest availability.  Thank you,  RESTRICTIONS:  During your procedure today, you received medications for sedation.  These   medications may affect your judgment, balance and coordination.  Therefore,   for 24 hours, you have the following restrictions:   - DO NOT drive a car, operate machinery, make legal/financial decisions,   sign important papers or drink alcohol.    ACTIVITY:  Today: no heavy lifting, straining or running due to procedural   sedation/anesthesia.  The following day: return to full activity including work.  DIET:  Eat and drink normally unless instructed otherwise.     TREATMENT FOR COMMON SIDE EFFECTS:  - Mild abdominal pain, nausea, belching, bloating or excessive gas:  rest,   eat lightly and use a heating pad.  - Sore Throat: treat with throat lozenges and/or gargle with warm salt   water.  - Because air was used during the procedure, expelling large amounts of air   from your rectum or belching is normal.  - If a bowel prep was taken, you may not have a bowel movement for 1-3 days.    This is normal.  SYMPTOMS TO WATCH FOR AND REPORT TO YOUR PHYSICIAN:  1. Abdominal pain or bloating, other than gas cramps.  2. Chest pain.  3. Back pain.  4. Signs of infection such as: chills or fever occurring within 24 hours   after the procedure.  5. Rectal bleeding, which would show as bright red, maroon, or black stools.   (A tablespoon of blood from the rectum is not serious, especially  if   hemorrhoids are present.)  6. Vomiting.  7. Weakness or dizziness.  GO DIRECTLY TO THE NEAREST EMERGENCY ROOM IF YOU HAVE ANY OF THE FOLLOWING:      Difficulty breathing              Chills and/or fever over 101 F   Persistent vomiting and/or vomiting blood   Severe abdominal pain   Severe chest pain   Black, tarry stools   Bleeding- more than one tablespoon   Any other symptom or condition that you feel may need urgent attention  Your doctor recommends these additional instructions:  If any biopsies were taken, your doctors clinic will contact you in 1 to 2   weeks with any results.  - Discharge patient to home.   - Resume previous diet.   - Continue present medications.   - Advance diet as tolerated.   - Return to GI clinic as previously scheduled.  For questions, problems or results please call your physician - Miller Phillips MD at Work:  [phone].  [facility] can be reached at [phone]     IF A COMPLICATION OR EMERGENCY SITUATION ARISES AND YOU ARE UNABLE TO REACH   YOUR PHYSICIAN - GO DIRECTLY TO THE EMERGENCY ROOM.  Miller Phillips MD  9/19/2023 3:21:23 PM  This report has been verified and signed electronically.  Dear patient,  As a result of recent federal legislation (The Federal Cures Act), you may   receive lab or pathology results from your procedure in your [facility]   account before your physician is able to contact you. Your physician or   their representative will relay the results to you with their   recommendations at their soonest availability.  Thank you,  [employer]ATION

## 2023-09-19 NOTE — OR NURSING
PROCEDURE AND RECOVERY COMPLETED. DR. SHAW DISCUSSED FINDINGS WITH PATIENT;  DISCHARGE INSTRUCTIONS GIVEN AND UNDERSTANDING VERBALIZED; DENIES ANY CONCERNS ASSISTED UP WITHOUT UNTOWARD EFFECTS; PATIENT READY DISCHARGE.

## 2023-09-19 NOTE — TRANSFER OF CARE
Anesthesia Transfer of Care Note    Patient: Ade Castellano    Procedure(s) Performed: Procedure(s) (LRB):  EGD (ESOPHAGOGASTRODUODENOSCOPY) (N/A)    Patient location: GI    Anesthesia Type: general    Transport from OR: Transported from OR on room air with adequate spontaneous ventilation    Post pain: adequate analgesia    Post assessment: no apparent anesthetic complications    Post vital signs: stable    Level of consciousness: sedated    Nausea/Vomiting: no nausea/vomiting    Complications: none    Transfer of care protocol was followed      Last vitals:   Visit Vitals  /69 (BP Location: Left arm, Patient Position: Lying)   Pulse (!) 59   Temp 36.5 °C (97.7 °F) (Oral)   Resp 17   LMP  (LMP Unknown)   SpO2 98%   Breastfeeding No

## 2023-09-21 ENCOUNTER — OFFICE VISIT (OUTPATIENT)
Dept: CARDIOLOGY | Facility: CLINIC | Age: 78
End: 2023-09-21
Payer: MEDICARE

## 2023-09-21 ENCOUNTER — LAB VISIT (OUTPATIENT)
Dept: LAB | Facility: HOSPITAL | Age: 78
End: 2023-09-21
Attending: INTERNAL MEDICINE
Payer: MEDICARE

## 2023-09-21 VITALS
SYSTOLIC BLOOD PRESSURE: 127 MMHG | DIASTOLIC BLOOD PRESSURE: 69 MMHG | WEIGHT: 159.94 LBS | HEART RATE: 51 BPM | OXYGEN SATURATION: 100 % | BODY MASS INDEX: 31.4 KG/M2 | HEIGHT: 60 IN

## 2023-09-21 DIAGNOSIS — I25.118 CORONARY ARTERY DISEASE OF NATIVE ARTERY OF NATIVE HEART WITH STABLE ANGINA PECTORIS: ICD-10-CM

## 2023-09-21 DIAGNOSIS — I10 PRIMARY HYPERTENSION: ICD-10-CM

## 2023-09-21 DIAGNOSIS — R06.09 DOE (DYSPNEA ON EXERTION): ICD-10-CM

## 2023-09-21 DIAGNOSIS — Z66 DNR (DO NOT RESUSCITATE): Chronic | ICD-10-CM

## 2023-09-21 DIAGNOSIS — C34.90 SQUAMOUS CELL CARCINOMA OF LUNG, UNSPECIFIED LATERALITY: Chronic | ICD-10-CM

## 2023-09-21 DIAGNOSIS — I50.32 CHRONIC HEART FAILURE WITH PRESERVED EJECTION FRACTION: ICD-10-CM

## 2023-09-21 DIAGNOSIS — I70.0 ATHEROSCLEROSIS OF AORTA: Chronic | ICD-10-CM

## 2023-09-21 DIAGNOSIS — I70.0 ATHEROSCLEROSIS OF AORTA: Primary | Chronic | ICD-10-CM

## 2023-09-21 LAB
ANION GAP SERPL CALC-SCNC: 11 MMOL/L (ref 8–16)
BNP SERPL-MCNC: 272 PG/ML (ref 0–99)
BUN SERPL-MCNC: 15 MG/DL (ref 8–23)
CALCIUM SERPL-MCNC: 9.2 MG/DL (ref 8.7–10.5)
CHLORIDE SERPL-SCNC: 109 MMOL/L (ref 95–110)
CO2 SERPL-SCNC: 22 MMOL/L (ref 23–29)
CREAT SERPL-MCNC: 0.9 MG/DL (ref 0.5–1.4)
EST. GFR  (NO RACE VARIABLE): >60 ML/MIN/1.73 M^2
GLUCOSE SERPL-MCNC: 109 MG/DL (ref 70–110)
POTASSIUM SERPL-SCNC: 4.1 MMOL/L (ref 3.5–5.1)
SODIUM SERPL-SCNC: 142 MMOL/L (ref 136–145)

## 2023-09-21 PROCEDURE — 1126F AMNT PAIN NOTED NONE PRSNT: CPT | Mod: HCNC,CPTII,S$GLB, | Performed by: INTERNAL MEDICINE

## 2023-09-21 PROCEDURE — 99999 PR PBB SHADOW E&M-EST. PATIENT-LVL IV: ICD-10-PCS | Mod: PBBFAC,HCNC,, | Performed by: INTERNAL MEDICINE

## 2023-09-21 PROCEDURE — 1159F PR MEDICATION LIST DOCUMENTED IN MEDICAL RECORD: ICD-10-PCS | Mod: HCNC,CPTII,S$GLB, | Performed by: INTERNAL MEDICINE

## 2023-09-21 PROCEDURE — 1126F PR PAIN SEVERITY QUANTIFIED, NO PAIN PRESENT: ICD-10-PCS | Mod: HCNC,CPTII,S$GLB, | Performed by: INTERNAL MEDICINE

## 2023-09-21 PROCEDURE — 1101F PT FALLS ASSESS-DOCD LE1/YR: CPT | Mod: HCNC,CPTII,S$GLB, | Performed by: INTERNAL MEDICINE

## 2023-09-21 PROCEDURE — 36415 COLL VENOUS BLD VENIPUNCTURE: CPT | Mod: HCNC | Performed by: INTERNAL MEDICINE

## 2023-09-21 PROCEDURE — 3078F DIAST BP <80 MM HG: CPT | Mod: HCNC,CPTII,S$GLB, | Performed by: INTERNAL MEDICINE

## 2023-09-21 PROCEDURE — 93000 EKG 12-LEAD: ICD-10-PCS | Mod: HCNC,S$GLB,, | Performed by: INTERNAL MEDICINE

## 2023-09-21 PROCEDURE — 3078F PR MOST RECENT DIASTOLIC BLOOD PRESSURE < 80 MM HG: ICD-10-PCS | Mod: HCNC,CPTII,S$GLB, | Performed by: INTERNAL MEDICINE

## 2023-09-21 PROCEDURE — 3074F SYST BP LT 130 MM HG: CPT | Mod: HCNC,CPTII,S$GLB, | Performed by: INTERNAL MEDICINE

## 2023-09-21 PROCEDURE — 3288F FALL RISK ASSESSMENT DOCD: CPT | Mod: HCNC,CPTII,S$GLB, | Performed by: INTERNAL MEDICINE

## 2023-09-21 PROCEDURE — 80048 BASIC METABOLIC PNL TOTAL CA: CPT | Mod: HCNC | Performed by: INTERNAL MEDICINE

## 2023-09-21 PROCEDURE — 1159F MED LIST DOCD IN RCRD: CPT | Mod: HCNC,CPTII,S$GLB, | Performed by: INTERNAL MEDICINE

## 2023-09-21 PROCEDURE — 1157F ADVNC CARE PLAN IN RCRD: CPT | Mod: HCNC,CPTII,S$GLB, | Performed by: INTERNAL MEDICINE

## 2023-09-21 PROCEDURE — 1157F PR ADVANCE CARE PLAN OR EQUIV PRESENT IN MEDICAL RECORD: ICD-10-PCS | Mod: HCNC,CPTII,S$GLB, | Performed by: INTERNAL MEDICINE

## 2023-09-21 PROCEDURE — 99214 OFFICE O/P EST MOD 30 MIN: CPT | Mod: HCNC,S$GLB,, | Performed by: INTERNAL MEDICINE

## 2023-09-21 PROCEDURE — 1101F PR PT FALLS ASSESS DOC 0-1 FALLS W/OUT INJ PAST YR: ICD-10-PCS | Mod: HCNC,CPTII,S$GLB, | Performed by: INTERNAL MEDICINE

## 2023-09-21 PROCEDURE — 99214 PR OFFICE/OUTPT VISIT, EST, LEVL IV, 30-39 MIN: ICD-10-PCS | Mod: HCNC,S$GLB,, | Performed by: INTERNAL MEDICINE

## 2023-09-21 PROCEDURE — 83880 ASSAY OF NATRIURETIC PEPTIDE: CPT | Mod: HCNC | Performed by: INTERNAL MEDICINE

## 2023-09-21 PROCEDURE — 3288F PR FALLS RISK ASSESSMENT DOCUMENTED: ICD-10-PCS | Mod: HCNC,CPTII,S$GLB, | Performed by: INTERNAL MEDICINE

## 2023-09-21 PROCEDURE — 99999 PR PBB SHADOW E&M-EST. PATIENT-LVL IV: CPT | Mod: PBBFAC,HCNC,, | Performed by: INTERNAL MEDICINE

## 2023-09-21 PROCEDURE — 93000 ELECTROCARDIOGRAM COMPLETE: CPT | Mod: HCNC,S$GLB,, | Performed by: INTERNAL MEDICINE

## 2023-09-21 PROCEDURE — 3074F PR MOST RECENT SYSTOLIC BLOOD PRESSURE < 130 MM HG: ICD-10-PCS | Mod: HCNC,CPTII,S$GLB, | Performed by: INTERNAL MEDICINE

## 2023-09-21 NOTE — PROGRESS NOTES
Subjective:   Patient ID:  Ade Castellano is a 77 y.o. female who presents for follow-up of Hypertension      HPI    CAD - KUN to RCA 2006, diastolic CHF, HTN, HLD, lung CA, breast CA     Previously followed by Dr Skelton  Patient with a past medical history of coronary artery disease.  Past medical history significant for lung cancer.  No significant coronary artery disease on coronary angiogram had luminal irregularities.     Heart failure with preserved ejection fraction     Squamous cell carcinoma of lung     Breast cancer     Aortic arch atherosclerosis     [facility] 8/13/20  Non-obstructive CAD.  No significant coronary artery stenosis. Luminal irregularities. Previously placed RCA stent is widely patent.     Stress test 7/24/20    The study shows abnormal myocardial perfusion.    Abnormal myocardial perfusion study.    Perfusion Defect There is a mild to moderate intensity, small to moderate sized, mostly reversible with some fixed areas defect in the anteroapical wall(s).    Gated perfusion images showed an ejection fraction of 71%    There is normal wall motion at rest and post stress.    The EKG portion of this study is negative for ischemia.    The patient reported no chest pain during the stress test.    There were no arrhythmias during stress.     Echo 6/7/23  The left ventricle is normal in size with normal systolic function.  The estimated ejection fraction is 55%.  Mild left atrial enlargement.  Grade III left ventricular diastolic dysfunction.  Normal right ventricular size with normal right ventricular systolic function.  Mild right atrial enlargement.  Moderate mitral regurgitation.  Normal central venous pressure (3 mmHg).  The estimated PA systolic pressure is 40 mmHg.  There is moderate pulmonary hypertension.  Normal GLS -18.7%.     Holter 7/24/20  The diary was not returned.  NSR. Heart rates varied between 47 and 89 bpm with an average of 58 bpm.  Rare PACs / PVCs     EKG 4/25/23 Sinus bradycardia  otherwise ok     6/21/23 Denies CP, Stable LOGAN  Stopped metoprolol recently for hypotension  BP controlled  Echo with EF 55% and moderate MR  Continue Rx for CAD, CHF, HTN, HLD  OV 3 months with BNP, BMP    9/21/23 labs done this morning. Denies Cp, LOGAN improved  EKG sinus bradycardia 51 otherwise ok  BP controlled    Review of Systems   Constitutional: Negative for decreased appetite.   HENT:  Negative for ear discharge.    Eyes:  Negative for blurred vision.   Respiratory:  Negative for hemoptysis.    Endocrine: Negative for polyphagia.   Hematologic/Lymphatic: Negative for adenopathy.   Skin:  Negative for color change.   Musculoskeletal:  Negative for joint swelling.   Genitourinary:  Negative for bladder incontinence.   Neurological:  Negative for brief paralysis.   Psychiatric/Behavioral:  Negative for hallucinations.    Allergic/Immunologic: Negative for hives.       Objective:   Physical Exam  Constitutional:       Appearance: She is well-developed.   HENT:      Head: Normocephalic and atraumatic.   Eyes:      Conjunctiva/sclera: Conjunctivae normal.      Pupils: Pupils are equal, round, and reactive to light.   Cardiovascular:      Rate and Rhythm: Normal rate.      Pulses: Intact distal pulses.      Heart sounds: Normal heart sounds.   Pulmonary:      Effort: Pulmonary effort is normal.      Breath sounds: Normal breath sounds.   Abdominal:      General: Bowel sounds are normal.      Palpations: Abdomen is soft.   Musculoskeletal:         General: Normal range of motion.      Cervical back: Normal range of motion and neck supple.   Skin:     General: Skin is warm and dry.   Neurological:      Mental Status: She is alert and oriented to person, place, and time.         Assessment:      1. Atherosclerosis of aorta    2. Chronic heart failure with preserved ejection fraction    3. Coronary artery disease of native artery of native heart with stable angina pectoris    4. LOGAN (dyspnea on exertion)    5. Primary  hypertension        Plan:     Continue Rx for CAD, CHF, HTN, HLD  F/u labs if stable then OV 6 months

## 2023-09-22 NOTE — ANESTHESIA POSTPROCEDURE EVALUATION
Anesthesia Post Evaluation    Patient: Ade Castellano    Procedure(s) Performed: Procedure(s) (LRB):  EGD (ESOPHAGOGASTRODUODENOSCOPY) (N/A)    Final Anesthesia Type: general      Patient location during evaluation: GI PACU  Patient participation: Yes- Able to Participate  Level of consciousness: awake and alert and oriented  Post-procedure vital signs: reviewed and stable  Pain management: adequate  Airway patency: patent    PONV status at discharge: No PONV  Anesthetic complications: no      Cardiovascular status: blood pressure returned to baseline and hemodynamically stable  Respiratory status: unassisted, spontaneous ventilation and room air  Hydration status: euvolemic  Follow-up not needed.          Vitals Value Taken Time   /69 09/21/23 0857   Temp 36.5 °C (97.7 °F) 09/19/23 1509   Pulse 51 09/21/23 0857   Resp 18 09/19/23 1554   SpO2 100 % 09/21/23 0857         Event Time   Out of Recovery 15:45:00         Pain/Sergio Score: No data recorded

## 2023-09-26 LAB
FINAL PATHOLOGIC DIAGNOSIS: NORMAL
Lab: NORMAL

## 2023-09-27 ENCOUNTER — TELEPHONE (OUTPATIENT)
Dept: GASTROENTEROLOGY | Facility: HOSPITAL | Age: 78
End: 2023-09-27
Payer: MEDICARE

## 2023-09-27 NOTE — TELEPHONE ENCOUNTER
----- Message from Dena John MA sent at 9/26/2023  3:06 PM CDT -----    ----- Message -----  From: Sofiya Muñoz  Sent: 9/26/2023   1:40 PM CDT  To: Jacqueline ANDERSON Staff    .Type: Patient Call Back    Who called:self    What is the request in detail: requesting a callback from nurse to discuss results from last appointment     Can the clinic reply by [facility]?call    Would the patient rather a call back or a response via [facility]? call    Best call back number: [phone]     Additional Information:

## 2023-10-02 ENCOUNTER — TELEPHONE (OUTPATIENT)
Dept: HEMATOLOGY/ONCOLOGY | Facility: CLINIC | Age: 78
End: 2023-10-02
Payer: MEDICARE

## 2023-10-02 ENCOUNTER — TELEPHONE (OUTPATIENT)
Dept: GASTROENTEROLOGY | Facility: CLINIC | Age: 78
End: 2023-10-02
Payer: MEDICARE

## 2023-10-02 NOTE — TELEPHONE ENCOUNTER
Stating that she has swelling in her stomach and it is making her short of breath  Advised to go to ER now  She stated she wants to wait in am because she cannot see to drive at night Advised her she needs to call ambulance to go to er especially if breathing becomes worse not to wait She acknowledged understanding

## 2023-10-02 NOTE — TELEPHONE ENCOUNTER
MA returned patient's call. Mrs. Castellano is requesting a call from Dr. Phillips to discuss her procedure.

## 2023-10-09 ENCOUNTER — OFFICE VISIT (OUTPATIENT)
Dept: HEMATOLOGY/ONCOLOGY | Facility: CLINIC | Age: 78
End: 2023-10-09
Payer: MEDICARE

## 2023-10-09 ENCOUNTER — INFUSION (OUTPATIENT)
Dept: INFUSION THERAPY | Facility: HOSPITAL | Age: 78
End: 2023-10-09
Attending: INTERNAL MEDICINE
Payer: MEDICARE

## 2023-10-09 VITALS
SYSTOLIC BLOOD PRESSURE: 142 MMHG | RESPIRATION RATE: 18 BRPM | DIASTOLIC BLOOD PRESSURE: 72 MMHG | OXYGEN SATURATION: 98 % | HEART RATE: 52 BPM | TEMPERATURE: 98 F

## 2023-10-09 VITALS
OXYGEN SATURATION: 98 % | HEIGHT: 63 IN | WEIGHT: 164.69 LBS | BODY MASS INDEX: 29.18 KG/M2 | SYSTOLIC BLOOD PRESSURE: 104 MMHG | HEART RATE: 56 BPM | DIASTOLIC BLOOD PRESSURE: 72 MMHG

## 2023-10-09 DIAGNOSIS — C50.919 RECURRENT BREAST CANCER, UNSPECIFIED LATERALITY: Primary | ICD-10-CM

## 2023-10-09 DIAGNOSIS — C34.90 SQUAMOUS CELL CARCINOMA LUNG: Primary | ICD-10-CM

## 2023-10-09 DIAGNOSIS — R79.9 ABNORMAL FINDING OF BLOOD CHEMISTRY, UNSPECIFIED: ICD-10-CM

## 2023-10-09 DIAGNOSIS — J30.9 ALLERGIC SINUSITIS: ICD-10-CM

## 2023-10-09 DIAGNOSIS — R79.89 OTHER SPECIFIED ABNORMAL FINDINGS OF BLOOD CHEMISTRY: ICD-10-CM

## 2023-10-09 DIAGNOSIS — Z85.118 HISTORY OF LUNG CANCER: ICD-10-CM

## 2023-10-09 DIAGNOSIS — C50.919 RECURRENT BREAST CANCER, UNSPECIFIED LATERALITY: ICD-10-CM

## 2023-10-09 DIAGNOSIS — J44.9 CHRONIC OBSTRUCTIVE PULMONARY DISEASE, UNSPECIFIED COPD TYPE: ICD-10-CM

## 2023-10-09 DIAGNOSIS — I50.32 CHRONIC HEART FAILURE WITH PRESERVED EJECTION FRACTION: ICD-10-CM

## 2023-10-09 LAB
ALBUMIN SERPL BCP-MCNC: 3.7 G/DL (ref 3.5–5.2)
ALP SERPL-CCNC: 45 U/L (ref 55–135)
ALT SERPL W/O P-5'-P-CCNC: 12 U/L (ref 10–44)
ANION GAP SERPL CALC-SCNC: 7 MMOL/L (ref 8–16)
AST SERPL-CCNC: 15 U/L (ref 10–40)
BASOPHILS # BLD AUTO: 0.07 K/UL (ref 0–0.2)
BASOPHILS NFR BLD: 1.6 % (ref 0–1.9)
BILIRUB SERPL-MCNC: 0.4 MG/DL (ref 0.1–1)
BUN SERPL-MCNC: 12 MG/DL (ref 8–23)
CALCIUM SERPL-MCNC: 8.5 MG/DL (ref 8.7–10.5)
CHLORIDE SERPL-SCNC: 108 MMOL/L (ref 95–110)
CO2 SERPL-SCNC: 25 MMOL/L (ref 23–29)
CREAT SERPL-MCNC: 0.8 MG/DL (ref 0.5–1.4)
DIFFERENTIAL METHOD: NORMAL
EOSINOPHIL # BLD AUTO: 0.2 K/UL (ref 0–0.5)
EOSINOPHIL NFR BLD: 3.8 % (ref 0–8)
ERYTHROCYTE [DISTWIDTH] IN BLOOD BY AUTOMATED COUNT: 13.3 % (ref 11.5–14.5)
EST. GFR  (NO RACE VARIABLE): >60 ML/MIN/1.73 M^2
GLUCOSE SERPL-MCNC: 113 MG/DL (ref 70–110)
HCT VFR BLD AUTO: 39.5 % (ref 37–48.5)
HGB BLD-MCNC: 12.8 G/DL (ref 12–16)
IMM GRANULOCYTES # BLD AUTO: 0.02 K/UL (ref 0–0.04)
IMM GRANULOCYTES NFR BLD AUTO: 0.5 % (ref 0–0.5)
LYMPHOCYTES # BLD AUTO: 1.2 K/UL (ref 1–4.8)
LYMPHOCYTES NFR BLD: 25.9 % (ref 18–48)
MAGNESIUM SERPL-MCNC: 1.5 MG/DL (ref 1.6–2.6)
MCH RBC QN AUTO: 30 PG (ref 27–31)
MCHC RBC AUTO-ENTMCNC: 32.4 G/DL (ref 32–36)
MCV RBC AUTO: 93 FL (ref 82–98)
MONOCYTES # BLD AUTO: 0.4 K/UL (ref 0.3–1)
MONOCYTES NFR BLD: 9.7 % (ref 4–15)
NEUTROPHILS # BLD AUTO: 2.6 K/UL (ref 1.8–7.7)
NEUTROPHILS NFR BLD: 58.5 % (ref 38–73)
NRBC BLD-RTO: 0 /100 WBC
PLATELET # BLD AUTO: 177 K/UL (ref 150–450)
PMV BLD AUTO: 9.9 FL (ref 9.2–12.9)
POTASSIUM SERPL-SCNC: 3.5 MMOL/L (ref 3.5–5.1)
PROT SERPL-MCNC: 6.2 G/DL (ref 6–8.4)
RBC # BLD AUTO: 4.26 M/UL (ref 4–5.4)
SODIUM SERPL-SCNC: 140 MMOL/L (ref 136–145)
WBC # BLD AUTO: 4.44 K/UL (ref 3.9–12.7)

## 2023-10-09 PROCEDURE — 3074F SYST BP LT 130 MM HG: CPT | Mod: HCNC,CPTII,S$GLB, | Performed by: INTERNAL MEDICINE

## 2023-10-09 PROCEDURE — 3078F DIAST BP <80 MM HG: CPT | Mod: HCNC,CPTII,S$GLB, | Performed by: INTERNAL MEDICINE

## 2023-10-09 PROCEDURE — 1157F PR ADVANCE CARE PLAN OR EQUIV PRESENT IN MEDICAL RECORD: ICD-10-PCS | Mod: HCNC,CPTII,S$GLB, | Performed by: INTERNAL MEDICINE

## 2023-10-09 PROCEDURE — 80053 COMPREHEN METABOLIC PANEL: CPT | Mod: HCNC | Performed by: INTERNAL MEDICINE

## 2023-10-09 PROCEDURE — 99999 PR PBB SHADOW E&M-EST. PATIENT-LVL III: ICD-10-PCS | Mod: PBBFAC,HCNC,, | Performed by: INTERNAL MEDICINE

## 2023-10-09 PROCEDURE — 3074F PR MOST RECENT SYSTOLIC BLOOD PRESSURE < 130 MM HG: ICD-10-PCS | Mod: HCNC,CPTII,S$GLB, | Performed by: INTERNAL MEDICINE

## 2023-10-09 PROCEDURE — 1126F AMNT PAIN NOTED NONE PRSNT: CPT | Mod: HCNC,CPTII,S$GLB, | Performed by: INTERNAL MEDICINE

## 2023-10-09 PROCEDURE — 99214 PR OFFICE/OUTPT VISIT, EST, LEVL IV, 30-39 MIN: ICD-10-PCS | Mod: HCNC,S$GLB,, | Performed by: INTERNAL MEDICINE

## 2023-10-09 PROCEDURE — 1157F ADVNC CARE PLAN IN RCRD: CPT | Mod: HCNC,CPTII,S$GLB, | Performed by: INTERNAL MEDICINE

## 2023-10-09 PROCEDURE — 99999 PR PBB SHADOW E&M-EST. PATIENT-LVL III: CPT | Mod: PBBFAC,HCNC,, | Performed by: INTERNAL MEDICINE

## 2023-10-09 PROCEDURE — 3078F PR MOST RECENT DIASTOLIC BLOOD PRESSURE < 80 MM HG: ICD-10-PCS | Mod: HCNC,CPTII,S$GLB, | Performed by: INTERNAL MEDICINE

## 2023-10-09 PROCEDURE — 83735 ASSAY OF MAGNESIUM: CPT | Mod: HCNC | Performed by: INTERNAL MEDICINE

## 2023-10-09 PROCEDURE — 36591 DRAW BLOOD OFF VENOUS DEVICE: CPT | Mod: HCNC

## 2023-10-09 PROCEDURE — 99214 OFFICE O/P EST MOD 30 MIN: CPT | Mod: HCNC,S$GLB,, | Performed by: INTERNAL MEDICINE

## 2023-10-09 PROCEDURE — 85025 COMPLETE CBC W/AUTO DIFF WBC: CPT | Mod: HCNC | Performed by: INTERNAL MEDICINE

## 2023-10-09 PROCEDURE — 63600175 PHARM REV CODE 636 W HCPCS: Mod: HCNC | Performed by: INTERNAL MEDICINE

## 2023-10-09 PROCEDURE — 1126F PR PAIN SEVERITY QUANTIFIED, NO PAIN PRESENT: ICD-10-PCS | Mod: HCNC,CPTII,S$GLB, | Performed by: INTERNAL MEDICINE

## 2023-10-09 RX ORDER — LEVOCETIRIZINE DIHYDROCHLORIDE 5 MG/1
TABLET, FILM COATED ORAL
Qty: 90 TABLET | Refills: 0 | Status: SHIPPED | OUTPATIENT
Start: 2023-10-09 | End: 2024-01-29

## 2023-10-09 RX ORDER — SODIUM CHLORIDE 0.9 % (FLUSH) 0.9 %
10 SYRINGE (ML) INJECTION
Status: DISCONTINUED | OUTPATIENT
Start: 2023-10-09 | End: 2023-10-09 | Stop reason: HOSPADM

## 2023-10-09 RX ORDER — HEPARIN 100 UNIT/ML
500 SYRINGE INTRAVENOUS
Status: DISCONTINUED | OUTPATIENT
Start: 2023-10-09 | End: 2023-10-09 | Stop reason: HOSPADM

## 2023-10-09 RX ADMIN — HEPARIN 500 UNITS: 100 SYRINGE at 08:10

## 2023-10-09 NOTE — NURSING
Pt to infusion for lab draw and port flush. Port accessed without difficulty, labs drawn, port flushed and de-accessed. Pt ambulatory with steady gait to exit.

## 2023-10-09 NOTE — TELEPHONE ENCOUNTER
Refill Decision Note   Ade Castellano  is requesting a refill authorization.  Brief Assessment and Rationale for Refill:  Approve     Medication Therapy Plan:         Comments:     Note composed:9:46 AM 10/09/2023

## 2023-10-09 NOTE — PROGRESS NOTES
Subjective:       Patient ID: Ade Castellano is a 78 y.o. female.    Chief Complaint: No chief complaint on file.         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 The PET(+) disease in the right axilla and right supraclavicular fossa will be boosted at 200 cGy per fraction to 6000 cGy total dose.  S/p LYMPH NODE, RIGHT AXILLA, BIOPSY: 6/16/21 . Metastatic poorly-differentiated carcinoma, c/w breast primary ERnegPRneg Her2 neg  PD-L1 (22C3) IHC CPS> is greater than or equal to 10  Pembrolizumab 7/22/21 -6/15/22         Prior Hx:  77 y/o female with history of  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  And Rt  breast Haja/p  cycle 6 of carboplatin/Abraxane 7/2/2018   She has  History of Stage 1A  Rt breast cancer Grade 3, ER/TN neg , Her 2 jose maria neg s/p lumpectomy 7/11/2014 and s/p adjuvant RT 9/2014 Pt declined Adjuvant chemo.Pathology showed a 1.15 cm, grade 3 infiltrating ductal carcinoma.  One sentinel lymph node was negative for malignancy.  Margins were negative and the closest margin was 1 cm.  She was staged  pT1c  pN0 cM0 stage IA.  She declined adjuvant chemo therapy.  She completed post operative radiation therapy at 400 cGy per fraction to 4000 cGy 9/2014  Pt hospitalized 11/2017 for  acute on chronic respiratory failure requiring intubation and ventilation. Has large lung mass in right lung with probable post obstructive pneumonia resulting in COPD  exacerbation.CTA chest 11/29/2017 revealed   Large right hilar mass with involvement of adjacent segmental pulmonary arterial branches and bronchi with associated postobstructive atelectasis and volume loss in the RML  with adjacent ground glass opacity concerning for underlying neoplastic process. Mediastinal and axillary adenopathy, with a right axillary lymph node measuring up to 2.0 cm in short axis diameter.She underwent rt axillary LN bx at outside facility- on 1/16/2018 at [facility]. Pathology revealed metastatic poorly differentiated carcinoma of unknown primary site. She next underwent lung bx at [facility] 1/31/2018  benign lung tissue. Repeat Right Lung Bx 2/14/2018 Pathology reveals Squamous cell carcinoma PD-L1 10% low expression EGFR NEG ALK NEG BRAF NEG. Outside slides axillary LN specimen were reviewed/comparison to lung bx findings . She completed    cycle 6 of carboplatin/Abraxane completed 7/2018 .PET/CT  4/19/2018 revealed there has been a excellent response to therapy.  At least 90% reduction in malignant activity.PET/CT 2/21/2019 increased size and irregularity of the right axillary lymph node with increased FDG avidityMAMMO/US rt breast 2/2019 revealed suspicious findings rt axilla LN.  Right axilla, mass, core biopsy: Positive for poorly differentiated carcinoma breast primary ER neg/KS neg Her2 neg.[facility] reviewIt was determined  the lung tumor corresponds to a squamous cellcarcinoma and appears to be unrelated to the invasive ductal carcinoma of the breast. The axillary mass, in myopinion, corresponds to metastases of the breast primary. Although it is a poorly differentiated carcinoma, theimmunophenotype is most consistent with the one that the breast primary exhibited, and is inconsistent with metastatic squamous cell carcinoma. She was treated with  AC ( adriamycin 60m/m2/Cytoxan 600mg/m2)  q21d x 4 cycles completed 9/6/2019 . PET/CT 9/19/2019 shows disease response  l decrease in size and uptake of several right axillary lymph nodes and a supraclavicular lymph node.  Mild residual activity may indicate viable tumor.Pt followed by Rad/Onc. She was presented at [facility] tumor board and it was determined to proceed Radiation therapy only. No ALND due to concurrent lung and supraclavicular node. She  completed  RT 12/16/2019  To right axilla and right supraclavicular region PET/CT imaging  5/20/21 shows Continued increase in both size and hypermetabolic activity of right axillary level 1 lymph nodes a new, mildly hypermetabolic cutaneous thickening of the right breast. she underwent LYMPH NODE, RIGHT AXILLA, BIOPSY: 6/16/21 . Pathology showed Metastatic poorly-differentiated carcinoma, consistent with breast primary-ERneg/ PRneg  HER2  neg  Pt started on pembrolizumab 7/2021  Pt hospitalized 4/6/22 with hypokalemia and orthostatic hypotension  S/p C16 pembrolizumab 6/15/22  ( 400mg q6wks)      Interval    She has been treated for recurrent UTI since last visit  Last treated 1 mo ago  She continues with mild LOGAN-stable  Continues with occasional cough  She is f/b pulmonology, Dr. Katz  Dysphagia improved s/p  dilation     Appetite and weight stable  She has supp 02 at home       PET /CT ( NOT DOTATATE) 4/14/23 No opacity in the left lower lobe on the current exam to correspond to a left lower lobe opacity described on the 7/13/2022 PET CT report.  Images from the 7/13/2022 PET CT are not available at the time of this report.   Cutaneous thickening in the right breast with mild uptake.  There was a similar finding described on the 71/3/2022 PET CT report.                           PrevHx:She had an abnormal mammogram 6/4/2014 whichRevealed a round solid mass 6mm in rt breast.She then underwent U/S guided core bx of rt breast mass on 6/17/2014.Pathology revealed infiltrating ductal carcinoma Grade 3 with tumorPresent in thin-walled spaces suggestive of lymphatic spaces.  "HormoneReceptor status on tumor specimen revealed ER negative 0% ,IN negative 0% and Her 2 jose maria negative.She subsequently underwent rt segmental mastectomy and SLN bxOn 7/11/2014. Pathology of rt breast lumpectomy revealed invasiveDuctal carcinoma with micropapillary pattern ( Invasive micropapillaryCa) with max tumor dimension 11.5mm with suggestion of tumor in Thin walled spaces c/w lymphovascular involvement. SurgicalMargins free of tumor, grade 3, ER/IN neg , Her 2 jose maria neg With [place] Lymph node negative for Neoplasm. Pathologic staging [id number](i-)Her mother was diagnosed with breast cancer in her 50's/        Review of Systems   Constitutional: Positive for mild  fatigue. No  activity change,  fever.   HENT: Positive for dysphagia. Negative for mouth sores, rhinorrhea.   Eyes: Negative for visual disturbance.   Respiratory: Positive for  cough ( improved)Positive for chronic  LOGAN Negative for wheezing.    Cardiovascular: Negative for chest pain.   Gastrointestinal:  Negative for abdominal pain and diarrhea.   Genitourinary: Negative for frequency.   Musculoskeletal: Negative for back pain.   Skin: Negative for rash.   Neurological: Negative for dizziness and headaches.   Hematological: Negative for adenopathy.   Psychiatric/Behavioral: The patient is not nervous/anxious.        Objective:        Vitals:    10/09/23 1003   BP: 104/72   Pulse: (!) 56   SpO2: 98%   Weight: 74.7 kg (164 lb 10.9 oz)   Height: 5' 3" (1.6 m)           Physical Exam   Constitutional: She is oriented to person, place, and time. She appears ill, coughing episodes  HENT:   Head: Normocephalic.   Eyes: Conjunctivae and lids are normal.No scleral icterus.   Neck: Normal range of motion. Neck supple. No thyromegaly present.   Cardiovascular: Normal rate, regular rhythm and normal heart sounds.   murmur heard.  Pulmonary/Chest: Breath sounds normal. She has no wheezes. She has no rales.   Abdominal: Soft. Bowel sounds are normal.  There is " no tenderness. There is no rebound and no guarding.   Left breast- no masses or axillary LAD noted  Right breast- breast thickening inner quad    She has no cervical adenopathy.     She has rt axillary adenopathy.        Right: No supraclavicular adenopathy present.        Left: No supraclavicular adenopathy present.   Neurological: She is alert and oriented to person, place, and time. No cranial nerve deficit. Coordination normal.   Skin: Skin is warm and dry. No ecchymosis, no petechiae and no rash noted. No erythema.   Psychiatric: She has a normal mood and affect.             CT a/p w/contrast 11/29/2018   1. No acute abnormality identified within the abdomen and pelvis.    2.  There are a few nonspecific prominent lymph nodes in the upper abdomen including a periesophageal lymph node which measures 1.0 cm in short axis diameter.    3.  Significant abdominal aortic atherosclerosis and abdominal aorta ectasia.    4.  Additional findings as above.    PET/CT 1/26/2018   1.  Intense FDG uptake within the known right infrahilar lung mass, compatible with malignancy.  It is unclear if this represents primary lung malignancy or metastatic breast cancer.    2.  Intensely hypermetabolic right axillary, retropectoral, and lower right cervical lymph nodes, compatible with metastases.    3.  Abnormal FDG uptake along right breast skin thickening, new from prior chest CTA and mammogram.  This may relate to localized edema/inflammation, though correlation with physical exam and mammography are recommended to exclude underlying inflammatory carcinoma.      MRI Brain w w/out contrast 2/9/2018  1.  No evidence of intracranial metastases.  2.  Sinus disease       Rt axillary LN bx at outside facility- on 1/16/2018 at [facility]  Pathology revealed metastatic poorly differentiated carcinoma of unknown primary  site 1/16/2018 at [facility]  TTF-1 negative, napsin negative  , cytokeratin 7 positive,  cytokeratin 20 negative, p63 negative, cytokeratin 5/6 focal dim positivity    LUNG BIOPSY 1/31/2018  Pathology revealed benign lung tissue         SPECIMEN  1) Right Lung Bx 2/14/2018  Supplemental Diagnosis  Immunohistochemical stains show strong nuclear staining for p63 in essentially all tumor cells and very strong  cytoplasmic and membrane staining for CK5/6, also in essentially all tumor cells. TTF-1 and CK7 are negative  within tumor cells but do stain native pulmonary elements present within the biopsy. A stain for mucicarmine is  negative. Positive and negative controls function appropriately.  Final diagnosis: Specimen submitted as right lung biopsy  -Squamous cell carcinoma  (Electronically Signed: 2018-02-20 09:12:07 )  Diagnosed by: Phong Thompson  FINAL PATHOLOGIC DIAGNOSIS  Fragments of pulmonary parenchyma (submitted as right lung biopsy):    FINAL PATHOLOGIC DIAGNOSIS 1. Lymph node, right axilla, biopsy, review of 10 outside slides [facility], JS [phone],  collected January 11, 2018: Metastatic poorly differentiated carcinoma (see comment).  The histologic section shows fibrous stroma infiltrated by nest of poorly differentiated malignant cells including  occasional dyskeratotic cells morphologically suggestive of metastatic squamous cell carcinoma.  Immunohistochemical stains are nonspecific; the cells are positive for cytokeratin 7 and cytokeratin 5/6 (focal) and  negative for cytokeratin 20, TTF-1, p63, GCDFP, mammoglobin, and CEA        PET/CT 2/21/2019  Increased size and irregularity of the right axillary lymph node with increased FDG avidity.  Although this would be atypical in location for lung carcinoma, the increased size and avidity must be concerning for metastatic disease in this patient with history of squamous cell lung cancer.     US Rt breast and mammo 2/26/2019  Impression:  Right  Lymph Node: Right axilla lymph node. Assessment: 4 - Suspicious finding. Biopsy is  recommended.      BI-RADS Category:   Right: 4 - Suspicious  Overall: 4 - Suspicious     Recommendation:  Biopsy is recommended. Biopsy of the lymph node measuring 1.4 x 1.1 x 1.3 recommended , the one with the round shape configuration, corresponding to the most recent PET CT finding.         Pathology 3/11/2019   FINAL PATHOLOGIC DIAGNOSIS  Right axilla, mass, core biopsy:  Positive for poorly differentiated carcinoma.  See comment.  Comment:  The biopsy from the right axilla mass shows a poorly differentiated carcinoma in background lymphoid tissue  with enlarged cells showing increased nuclear size, prominent nucleoli, moderate amounts of cytoplasm and mitotic  figures. Immunostains are completed and reveal the tumor cells to stain positively with cytokeratin AE 1/AE3, CK  5/6 and CK 7. The tumor cells are negative for CK 20, Estrogen receptor, p63 and TTF-1. All stains have  satisfactory positive and negative controls. The patient's prior cases will be reviewed and an additional stain for  JUAREZ-3 is pending in an attempt to pinpoint the primary site of this malignancy and results will follow in a  supplemental report.      Supplemental Diagnosis  3/11/2019  The current axillary mass is compared to the patient's prior right lung biopsy (case number [id number]) and the  current axillary mass is morphologically similar to the lung malignancy. Also, the current axillary mass is compared  to the patient's prior breast resection (Case number [id number]) and appears similar as well. P63 is negative in the  [id number] tumor and CK 5/6 shows scattered positive staining in the [id number] tumor.  Immunostain for JUAREZ-3 is completed and shows only rare weak to moderate staining within the tumor cell nuclei  with satisfactory positive and negative controls. This stain is positive in the surrounding lymphocytes. This staining  pattern is non-specific and does not definitively differentiate between a lung and breast primary  malignancy in this  right axilla mass biopsy. The staining profile of the current axillary mass match more closely with the previous  breast carcinoma (due to the p63 negativity in both the 2014 breast cancer and the current axillary mass lesion but  p63 positivity in the lung mass from 2018).  This staining profile, together with the comparison with the patient's prior breast tumor and prior lung tumor supports  a diagnosis of poorly differentiated carcinoma and the malignancy appears morphologically similar to the prior  malignancies from both sites, but the staining profile in this small sample from the axillary mass more closely  correlates with the prior breast malignancy. Pathologic subclassification of this malignancy's primary location is not  definitive and clinical correlation is recommended definitively decide on possible primary location in this patient's  axillary mass sample.  Supplemental (2):  Additional immunohistochemical staining for progesterone receptor and HER2 are completed per clinician request  with following results:  Progesterone receptor: Negative; 0% nuclear tumor cell staining.  HER2: Negative; stain score = 0.  Supplemental (3):  [place] DIAGNOSIS:  FINAL DIAGNOSIS  Breast, right, needle core biopsy ([id number]; 06/17/2014): Invasive ductal carcinoma, Jaiden grade III (of  III), with focal micropapillary features.  Immunohistochemical stains performed at the referring institution show that the tumor cells have the following  phenotype: - Estrogen receptor: Negative (0% tumor cells staining). - Progesterone receptor: Negative (0% tumor  cells staining). - HER2: Negative (score 0).  Lymph nodes, right, sentinel biopsy and lumpectomy ([id number]; 07/11/2014):  1. Right sentinel lymph node: A single (1) lymph node is negative for metastatic carcinoma.  Immunohistochemical stains performed by the referring institution and reviewed at [facility] for cytokeratin are  negative, confirming  the diagnosis.  2. Right breast: Invasive ductal carcinoma with micropapillary features, per report 11.5 mm in greatest dimension.  Foci suspicious for lymphovascular invasion identified. Margins of resection are free of tumor.  Immunohistochemical stains performed by the referring institution and reviewed at [facility] show that the tumor  cells are negative for p63 and show patchy positivity for CK 5/6.  Lung, right, needle core biopsy ([id number]; 02/14/2018): Squamous cell carcinoma.    Immunohistochemical stains performed by the referring institution show the tumor cells are strongly positive for p63  and CK 5/6, while negative for TTF-1 and CK7. These result support the diagnosis. Axilla, right, needle core biopsy  ([id number]; 03/11/2019): Lymph node positive for metastatic carcinoma, most consistent with breast primary  (see comment).  Immunohistochemical stains performed by the referring institution and reviewed at [facility] show that the tumor  cells are negative for p63, focally positive for CK 5/6, focally and weakly positive for JUAREZ-3, and strongly positive  for CK7. They are also negative for estrogen receptor, progesterone receptor, and HER2 JAM (score 0).  COMMENT:  Thank you for allowing me to review the case of this 73-year-old lady who recently underwent needle core biopsies  of a right axillary mass and who has a previous diagnosis of an invasive ductal carcinoma of the right breast, as well  as a squamous cell carcinoma of the lung. I am reviewing this case because of my special interest in breast  pathology.  I agree with your interpretation of this case. In my opinion, the lung tumor corresponds to a squamous cell  carcinoma and appears to be unrelated to the invasive ductal carcinoma of the breast. The axillary mass, in my  opinion, corresponds to metastases of the breast primary. Although it is a poorly differentiated carcinoma, the  immunophenotype is most consistent with the one that  the breast primary exhibited, and is inconsistent with a  metastatic squamous cell carcinoma. I did share the case with Dr. Anabel Stone, one of our pulmonary pathologists,  and she concurs with this interpretation.  Note: Report attached.  Performing location:  [facility]  [address] [place] [address]      LYMPH NODE, RIGHT AXILLA, BIOPSY: 6/16/21   - Metastatic poorly-differentiated carcinoma, consistent with breast primary  -ER, [place], and HER2 immunohistochemical hormonal markers are also negative  PD-L1 (22C3) SemiQuant IHC, Manual  Interpretation  Right axillary lymph node , specimen for PD-L1 immunohistochemistry studies (clone 22C3, [employer], [place], [place]; using a proprietary detection system) ([id number]):  Reported carcinoma site of origin: Breast  Result: The percent of PD-L1 positive cells based upon the total number of tumor cells (combined positive  score, CPS) is greater than or equal to 10.  Interpretation: Studies suggest that positive PD-L1 immunohistochemistry in tumor cells and/or tumorassociated  immune cells may predict tumor response to therapy with immune checkpoint inhibitors. This  result should not be used as the sole factor in determining treatment, as other factors (for example, tumor  mutation burden, and microsatellite instability) have been also studied as predictive markers.          CT neck/chest/abd/pelvis w/contrast 4/26/2019   The previously described right hilar mass is no longer visible.  There is persistent volume loss in the right middle lobe this appears similar to the prior PET-CT February 21, 2019.    Persistent abnormal right axillary node today measuring about 15 mm compared 18 mm prior.  The positioning of the adjacent nodes is slightly different likely accounting for the slight difference in measurement.    Cluster of tree-in-bud nodular densities left lower lobe series 2, image 93.  This may be related to  small airways disease though serial follow-up suggested.      PET/CT 6/20/2019   New and worsening hypermetabolic lymph nodes concerning for metastatic breast cancer as described above.  Tissue sampling would be required for definitive diagnosis.      2-D echo 6/27/2019   Normal left ventricular systolic function. The estimated ejection fraction is 55%  Concentric left ventricular remodeling.  Normal LV diastolic function.  Normal right ventricular systolic function.  Moderate left atrial enlargement.  Mild right atrial enlargement.  Mild aortic regurgitation.  Mild mitral regurgitation.  Mild tricuspid regurgitation.  Normal central venous pressure (3 mm Hg).  The estimated PA systolic pressure is 25 mm Hg      PET/CT 9/19/2019   Favorable response to therapy with interval decrease in size and uptake of several right axillary lymph nodes and a supraclavicular lymph node.  Mild residual activity may indicate viable tumor.    No new hypermetabolic findings worrisome for malignancy.    PET/CT 2/28/2020  1.  No evidence of hypermetabolic tumor.  Continued improvement of the right axilla status post adjuvant radiation.    2.  No new hypermetabolic lesions      CTA 6/17/2020  1. No evidence of pulmonary embolus. No aortic dissection.   2. There is no evidence for new or progressive disease in the chest as compared to PET/CT 6/20/2019 in this patient with history of right breast cancer and right lung neoplasm. The patient does have a more recent PET/CT 2/28/2020 from an outside facility per the electronic medical record although images are not available for direct comparison. Correlation with these images may be beneficial. Continued long-term surveillance recommended.  3. Stable appearance of the treated tumor in the right hilum/middle lobe.  4. Stable treated right breast cancer and axillary adenopathy without evidence for developing breast mass or new right axillary adenopathy.  5. Stable tiny nodularity/tree-in-bud  densities in the left lung, probably postinfectious or inflammatory.  6. Wall thickening of the esophagus may be correlated for esophagitis. Possible tiny hiatus hernia.  7. Slight bronchial wall thickening may be correlated for bronchitis.  8. Mild to moderate emphysema as before.  9. Reflux of contrast into the IVC and hepatic veins is nonspecific but may be correlated for elevated right heart pressures.  10. Coronary calcifications and/or stents similar to prior.    Echo 5/21/2020  Technically difficult study.   Normal left ventricular systolic function.   Left ventricular ejection fraction is estimated at 55%.   Severe mitral annular calcification.   Mild mitral valve regurgitation.   Aortic valve sclerosis without stenosis or regurgitation.     PFTs 6/17/2020  Spirometry reveals a very severe obstructive lung defect, which does not significantly improve post bronchodilators. Lung volumes revealed air trapping. Diffusing capacity was moderately impaired.        Del 6/26/2020  BI-RADS Category:   Overall: 2 - Benign      PET/CT 10/23/2020   Impression:     Stable mildly hypermetabolic right axillary lymph node/nodular density contralateral to the site of injection.  Differential considerations include metastatic lymph node, reactive lymph node from benign right upper extremity process, and fat necrosis.  Recommend physical examination of the upper extremity and consideration of ultrasound for further evaluation of the node/nodule and possible image guided biopsy.  Otherwise, attention on follow-up.     Otherwise, no other hypermetabolic disease identified      Mammo diagnostic right /US 11/4/2020   Impression:   1. No suspicious finding either on mammogram or ultrasound at the site of the palpable lump in the right breast at 10:00 o'clock. A bilateral mammogram is recommended in June 2020 to return to the patient to annual screening.   2. Redemonstration of the morphologically abnormal lymph node in the right  axilla that contains a biopsy clip, compatible with the known recurrence metastatic breast cancer that has been treated with chemotherapy. The appearance of this lymph node is unchanged from 06/26/2020 and overall appears smaller when compared to 03/11/2019.     Abd US 12/11/2020   Impression:     1. Echogenic liver likely representing hepatic steatosis.  2. Right upper quadrant ultrasound otherwise is unremarkable.     PET/CT 10/14/21     Impression:     1.  No definite evidence of hypermetabolic tumor.  Interval resolution of hypermetabolic right axillary lymph nodes.  No new hypermetabolic findings.     2.  Persistent low-grade diffuse cutaneous uptake which is nonspecific.    MRI shoulder w w/out contrast 1/26/22   Impression:     Mild edema and postcontrast enhancement at the myotendinous junction of the supraspinatus and infraspinatus, it is nonspecific and could be due to degenerative change or tendinosis.     Small area of interstitial tear at the footprint insertion of the infraspinatus tendon.     No large rotator cuff tear.     No advanced degenerative change.     No osseous lesions.     PET/CT 1/26/22  Impression:In this patient with breast cancer, there is no evidence of hypermetabolic tumor to suggest recurrent or metastatic disease.   Less extensive but, nonspecific, low-grade diffuse cutaneous uptake of the right breast.       PET/CT 4/27/22  Impression:     1.  No FDG avid disease to suggest recurrence or metastatic disease.     Unchanged right breast skin thickening with mild FDG uptake.       PET/CT 7/13/22   Impression:     In this patient with lung and breast cancer, there is new left lower lobe opacification with mild radiotracer uptake which may represent aspiration, pneumonia, or malignancy.  Tissue sampling would be required for definitive diagnosis.     Unchanged right breast skin thickening with mild radiotracer uptake.    CT chest w/contrast 8/25/22    Decreasing patchy pulmonary  consolidation within the left lower lobe when compared to prior PET-CT scan dated 07/13/2022.  The overall appearance of the patchy consolidation as well as the moderate improvement when compared to the prior study suggest a benign etiology such as left lower lobe pneumonia.     Persistent band of atelectasis/scarring within the right middle lobe, stable dating back to 04/26/2019.     Coronary artery atherosclerosis in a multi vessel distribution.     There are no measurable lesions per RECIST criteria.    PET /CT  11/21/22 shows New right lower lobe infiltrate worrisome for pneumonia or aspiration.Chronic infiltrate right middle lobe.   Resolution of the left lower lobe infiltrate.    Mammo 7/26/22  BI-RADS Category:   Overall: 2 - Benign        CT chest with contrast 11/21/22    Impression:     New right lower lobe infiltrate worrisome for pneumonia or aspiration.     Chronic infiltrate right middle lobe.     Resolution of the left lower lobe infiltrate.     Coronary artery calcifications.         PET/CT 1/11/23  Impression:     1. In this patient with lung and breast cancer, there is stable right breast skin thickening with mild radiotracer uptake.  2. Interval resolution of hypermetabolic opacification in the left lower lobe as compared to FDG PET-CT 07/14/2022.  Interval improvement in patchy opacities in the right lower lobe as compared to CT 11/21/2022    .                Electronically Signed By: Tapan Young MD 4/16/2023 23:17 CDT, [facility]  Narrative    PET/CT 4/14/23  [facility]  HISTORY:       Malignant neoplasm of female breast, unspecified estrogen receptor status, unspecified laterality, unspecified site of breast (CMS/HCC).       ICD10:  C50.919   Malignant neoplasm of female breast, unspecified estrogen receptor status, unspecified laterality, unspecified site of breast (CMS/HCC)       REFERENCE EXAMS:     7/13/2022 PET CT ([facility]) (no images, report only)     6/20/2019  PET CT ([facility])       TECHNIQUE:     PET/CT with attenuation correction.     Dose:  13.62 mCi of FDG-18     Injection site:  left wrist     Field of view:  Skull base to thighs.     Arm position:  above head     Baseline glucose:  128 mg/dL       FINDINGS:       All SUV values are max SUV.     Head/Neck:     Physiologic uptake of radiotracer.         Thorax:     Right portacath.       Cutaneous thickening in the right breast (SUV=1.58).       Calcification of the mitral valve, similar to 6/20/2019.       Coronary artery atherosclerotic calcification.     Atelectasis/scarring along the inferior aspect of the right major fissure, similar to 6/20/2019.         Abdomen/Pelvis:     Physiologic uptake in the genitourinary system.     Physiologic uptake in the intestines.       Patchy atherosclerotic calcification in the aorta and iliac arteries.       Impression      No opacity in the left lower lobe on the current exam to correspond to a left lower lobe opacity described on the 7/13/2022 PET CT report.  Images from the 7/13/2022 PET CT are not available at the time of this report.       Cutaneous thickening in the right breast with mild uptake.  There was a similar finding described on the 71/3/2022 PET CT report.           Electronically Signed By: Tapan Young MD 4/16/2023 23:17 CDT, [facility]          Mammo screening bilateral 8/7/23  BI-RADS Category:   Overall: 2 - Benign     Assessment:       1. Recurrent breast cancer, unspecified laterality    2. History of lung cancer    3. Chronic obstructive pulmonary disease, unspecified COPD type    4. Chronic heart failure with preserved ejection fraction    5. Abnormal finding of blood chemistry, unspecified    6. Other specified abnormal findings of blood chemistry                      Plan:     1.,2.   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  S/p adjuvant RT completed 9/2014 Pt declined adjuvant chemo  Pt  hospitalized 11/2017 with acute-on-chronic  resp failure  Abnormal CT imaging revealing Large right hilar mass with involvement of  adjacent segmental pulmonary arterial branches and bronchi with associated postobstructive atelectasis  and involvement of mediastinal and axillary adenopathy   S/p rt axillary LN bx ( outside facility) - metastatic poorly differentiated carcinoma of unknown primary  site  status post  lung biopsy 1/31/2017 -benign lung tissue  PET/CT 1/26/2018   Intense FDG uptake within the known right infrahilar lung mass, compatible with malignancy.   Intensely hypermetabolic right axillary, retropectoral, and lower right cervical lymph nodes, compatible with metastases.  Abnormal FDG uptake along right breast skin thickening, new from prior chest CTA and mammogram.  Follow up mammo today  Recent PET 4/2023 outside facility- no  evid of disease recurrence    Repeat lung bx 2/14/2018 revealed Squamous Cell CA lung PD-L1 10% EGFR NEGALK NEG  Pt treated for advanced squamous cell CA of lung She s/p  cycle 6 of carboplatin/Abraxane Day1 completed 7/2/2018 ( day 15 held due to prolonged cytopenias)  She completed therapy with near CR     PET/CT 2/21/2019 showed increased size and irregularity of the right axillary lymph node with increased FDG avidity     MAMMO/US rt breast 3/2019  reveals suspicious findings rt axilla LN.  Biopsy of the lymph node measuring 1.4 x 1.1 x 1.3     Pt s/p  rt axillary LN bxPositive for poorly differentiated carcinoma.- likely breast primary ERneg PRneg Her 2 neg    Outside review of specimen(s) revealed  lung tumor corresponds to a squamous cell carcinoma and appears to be unrelated to the invasive ductal carcinoma of the breast. It was determined The axillary mass, in my opinion, corresponded  to metastases of the breast primary. Although it is a poorly differentiated carcinoma,  theimmunophenotype is most consistent with the one that the breast primary exhibited, and is inconsistent with a metastatic squamous cell carcinoma.     CT imaging 4/26/2019 persistent rt axillary LAD, no other sites of disease     Lymph node biopsy 3/11/2019 Right axilla, mass, core biopsy:Positive for poorly differentiated carcinoma.favor breast primary     PET/CT 6/20/2019  New and worsening hypermetabolic lymph nodes concerning for metastatic breast cancer     Previously Discussed  imaging findings in detail with patient which reveals new and worsening hypermetabolic melena metabolic lymph nodes including hypermetabolic supraclavicular node.  Therefore, at treatment option will be systemic chemotherapy in light of the recent imaging findings.  She will be followed by her surgical oncologist Dr. Christopher      IT was determined to proceed with systemic chemotherapy   S/p  AC [id number] x 3 wks x 4 wks completed 9/6/2019   ( pt has not received in past)      PET/CT 9/19/2019 shows disease response with interval decrease in size and uptake of several right axillary lymph nodes and a supraclavicular lymph node.  Mild residual activity may indicate viable tumor.No new hypermetabolic findings worrisome for malignancy.    Pt followed by  Breast Surgery and plan was  for possible   ALND. Pt with Recurrent cancer in her right axilla and right supraclavicular fossa.   She completed chemotherapy 9/6/2019 with near CR  It was determined  Radiation will be given to the original areas of hypermetabolic activity in the right axilla and right supraclavicular region    Pt completed  RT 12/16/2019     PET/CT 1/14/21 shows   New right axillary hypermetabolic lymph nodes with preserved normal architecture suggestive of reactive nodes.  Stable appearing previously identified hypermetabolic lymph node with mild increase in surrounding fat stranding.        Findings previously d/w with treating breast surgeon and it was determined to cont  to monitor and close f/u as pt is heavily pre treated, has received RT to site   Pt acknowledged understanding and agreeable with plan     PET/CT imaging  5/20/21 shows Continued increase in both size and hypermetabolic activity of right axillary level 1 lymph nodes a new, mildly hypermetabolic cutaneous thickening of the right breast.     Right breast, skin, punch biopsy:Negative for carcinoma    LYMPH NODE, RIGHT AXILLA, BIOPSY: 6/16/21   - Metastatic poorly-differentiated carcinoma, consistent with breast primary triple neg  PD-L1 immunohistochemistry studies(combined positive score, CPS) is greater than or equal to 10   PET/CT showed  No definite evidence of hypermetabolic tumor.  Interval resolution of hypermetabolic right axillary lymph nodes.  No new hypermetabolic findings.      S/p C16 pembrolizumab 6/15/22   Last  PET/CT imaging shows  new left lower lobe opacification with mild radiotracer uptake which may represent aspiration, pneumonia, or malignancy.  Recent follow-up imaging studies decreasing patchy pulmonary consolidation within the left lower lobe when compared to prior PET-CT scan dated 07/13/2022.  Plan to remain off of therapy   Follow-up PET/CT imaging 1/11/23 Interval resolution of hypermetabolic opacification in the left lower lobe as compared to FDG PET-CT 07/14/2022.  Interval improvement in patchy opacities in the right lower lobe as compared to CT 11/21/2022   follow up PET imaging [id number] -  performed at [facility] .([employer] mobile was not available at  since broken )No opacity in the left lower lobe on the current exam to correspond to a left lower lobe opacity described on the 7/13/2022 PET CT report.  Images from the 7/13/2022 PET CT are not available at the time of this report.       Plan follow up PET imaging  3. F/b Pulmonology  Pt on supp 02 at night   Cont MDI and O2 as prescribed       4. .  stable  F/b Cardiology    5.6. Check Fe studies      Schedule PET on Tues prior to f/u    Cbc,cmp, Fe studies prior to f/u 2 mos     Advance Care Planning     Power of   I previously  initiated the process of advance care planning today and explained the importance of this process to the patient.  I introduced the concept of advance directives to the patient, as well. Then the patient received detailed information about the importance of designating a Health Care Power of  (HCPOA). She was also instructed to communicate with this person about their wishes for future healthcare, should she become sick and lose decision-making capacity. The patient has previously appointed a HCPOA. After our discussion, the patient has decided to complete a HCPOA and has appointed her son and niece and  I spent a total time of 16 minutes discussing this issue with the patient.         Cc: Swetha Katz M.D.         MD Ranjan Roberson MD

## 2023-10-09 NOTE — TELEPHONE ENCOUNTER
No care due was identified.  [facility] Embedded Care Due Messages. Reference number: [id number].   10/09/2023 8:53:59 AM CDT

## 2023-10-28 ENCOUNTER — OFFICE VISIT (OUTPATIENT)
Dept: FAMILY MEDICINE | Facility: CLINIC | Age: 78
End: 2023-10-28
Payer: MEDICARE

## 2023-10-28 VITALS
HEIGHT: 63 IN | TEMPERATURE: 97 F | WEIGHT: 159.63 LBS | SYSTOLIC BLOOD PRESSURE: 124 MMHG | BODY MASS INDEX: 28.29 KG/M2 | DIASTOLIC BLOOD PRESSURE: 68 MMHG | HEART RATE: 62 BPM | OXYGEN SATURATION: 97 %

## 2023-10-28 DIAGNOSIS — R73.03 PREDIABETES: ICD-10-CM

## 2023-10-28 DIAGNOSIS — E78.5 HYPERLIPIDEMIA LDL GOAL <70: ICD-10-CM

## 2023-10-28 DIAGNOSIS — I25.118 CORONARY ARTERY DISEASE OF NATIVE ARTERY OF NATIVE HEART WITH STABLE ANGINA PECTORIS: Primary | ICD-10-CM

## 2023-10-28 DIAGNOSIS — R25.2 MUSCLE CRAMPS: ICD-10-CM

## 2023-10-28 DIAGNOSIS — I10 PRIMARY HYPERTENSION: ICD-10-CM

## 2023-10-28 PROCEDURE — 1159F MED LIST DOCD IN RCRD: CPT | Mod: HCNC,CPTII,S$GLB, | Performed by: FAMILY MEDICINE

## 2023-10-28 PROCEDURE — 1126F AMNT PAIN NOTED NONE PRSNT: CPT | Mod: HCNC,CPTII,S$GLB, | Performed by: FAMILY MEDICINE

## 2023-10-28 PROCEDURE — 1101F PR PT FALLS ASSESS DOC 0-1 FALLS W/OUT INJ PAST YR: ICD-10-PCS | Mod: HCNC,CPTII,S$GLB, | Performed by: FAMILY MEDICINE

## 2023-10-28 PROCEDURE — 3288F FALL RISK ASSESSMENT DOCD: CPT | Mod: HCNC,CPTII,S$GLB, | Performed by: FAMILY MEDICINE

## 2023-10-28 PROCEDURE — 3078F DIAST BP <80 MM HG: CPT | Mod: HCNC,CPTII,S$GLB, | Performed by: FAMILY MEDICINE

## 2023-10-28 PROCEDURE — 1157F ADVNC CARE PLAN IN RCRD: CPT | Mod: HCNC,CPTII,S$GLB, | Performed by: FAMILY MEDICINE

## 2023-10-28 PROCEDURE — 99214 PR OFFICE/OUTPT VISIT, EST, LEVL IV, 30-39 MIN: ICD-10-PCS | Mod: HCNC,S$GLB,, | Performed by: FAMILY MEDICINE

## 2023-10-28 PROCEDURE — G0008 FLU VACCINE - HIGH DOSE (65+) PRESERVATIVE FREE IM: ICD-10-PCS | Mod: HCNC,S$GLB,, | Performed by: FAMILY MEDICINE

## 2023-10-28 PROCEDURE — 1160F RVW MEDS BY RX/DR IN RCRD: CPT | Mod: HCNC,CPTII,S$GLB, | Performed by: FAMILY MEDICINE

## 2023-10-28 PROCEDURE — 99999 PR PBB SHADOW E&M-EST. PATIENT-LVL IV: CPT | Mod: PBBFAC,HCNC,, | Performed by: FAMILY MEDICINE

## 2023-10-28 PROCEDURE — 3074F PR MOST RECENT SYSTOLIC BLOOD PRESSURE < 130 MM HG: ICD-10-PCS | Mod: HCNC,CPTII,S$GLB, | Performed by: FAMILY MEDICINE

## 2023-10-28 PROCEDURE — 1159F PR MEDICATION LIST DOCUMENTED IN MEDICAL RECORD: ICD-10-PCS | Mod: HCNC,CPTII,S$GLB, | Performed by: FAMILY MEDICINE

## 2023-10-28 PROCEDURE — 1160F PR REVIEW ALL MEDS BY PRESCRIBER/CLIN PHARMACIST DOCUMENTED: ICD-10-PCS | Mod: HCNC,CPTII,S$GLB, | Performed by: FAMILY MEDICINE

## 2023-10-28 PROCEDURE — 1126F PR PAIN SEVERITY QUANTIFIED, NO PAIN PRESENT: ICD-10-PCS | Mod: HCNC,CPTII,S$GLB, | Performed by: FAMILY MEDICINE

## 2023-10-28 PROCEDURE — 1101F PT FALLS ASSESS-DOCD LE1/YR: CPT | Mod: HCNC,CPTII,S$GLB, | Performed by: FAMILY MEDICINE

## 2023-10-28 PROCEDURE — 3074F SYST BP LT 130 MM HG: CPT | Mod: HCNC,CPTII,S$GLB, | Performed by: FAMILY MEDICINE

## 2023-10-28 PROCEDURE — 1157F PR ADVANCE CARE PLAN OR EQUIV PRESENT IN MEDICAL RECORD: ICD-10-PCS | Mod: HCNC,CPTII,S$GLB, | Performed by: FAMILY MEDICINE

## 2023-10-28 PROCEDURE — 90662 FLU VACCINE - HIGH DOSE (65+) PRESERVATIVE FREE IM: ICD-10-PCS | Mod: HCNC,S$GLB,, | Performed by: FAMILY MEDICINE

## 2023-10-28 PROCEDURE — G0008 ADMIN INFLUENZA VIRUS VAC: HCPCS | Mod: HCNC,S$GLB,, | Performed by: FAMILY MEDICINE

## 2023-10-28 PROCEDURE — 99999 PR PBB SHADOW E&M-EST. PATIENT-LVL IV: ICD-10-PCS | Mod: PBBFAC,HCNC,, | Performed by: FAMILY MEDICINE

## 2023-10-28 PROCEDURE — 90662 IIV NO PRSV INCREASED AG IM: CPT | Mod: HCNC,S$GLB,, | Performed by: FAMILY MEDICINE

## 2023-10-28 PROCEDURE — 3078F PR MOST RECENT DIASTOLIC BLOOD PRESSURE < 80 MM HG: ICD-10-PCS | Mod: HCNC,CPTII,S$GLB, | Performed by: FAMILY MEDICINE

## 2023-10-28 PROCEDURE — 99214 OFFICE O/P EST MOD 30 MIN: CPT | Mod: HCNC,S$GLB,, | Performed by: FAMILY MEDICINE

## 2023-10-28 PROCEDURE — 3288F PR FALLS RISK ASSESSMENT DOCUMENTED: ICD-10-PCS | Mod: HCNC,CPTII,S$GLB, | Performed by: FAMILY MEDICINE

## 2023-10-28 NOTE — PROGRESS NOTES
"Subjective     Patient ID: Ade Castellano is a 78 y.o. female.    Chief Complaint: Annual Exam    78 yfear old female presnte for follow up. She is due for labs and a high dose flu shot. She is stable. She states she has been having leg cramps and it has been so bad that it made her fall.   She has hypertension and CAD. She is s/p  breast and lung cancer. She is doing well. She has to take magnesium and potassium pille every now and then due to deficiencies, but otherwise has no issues. She is being followed by Dr. Holliday. She is getting a PET scan on 11/14/23  She also has a leaky heart valve that he cardiologist is monitoring.         ECHO:  Summary    The left ventricle is normal in size with normal systolic function.  The estimated ejection fraction is 55%.  Mild left atrial enlargement.  Grade III left ventricular diastolic dysfunction.  Normal right ventricular size with normal right ventricular systolic function.  Mild right atrial enlargement.  Moderate mitral regurgitation.  Normal central venous pressure (3 mmHg).  The estimated PA systolic pressure is 40 mmHg.  There is moderate pulmonary hypertension.  Normal GLS -18.7%.     Review of Systems   Respiratory:  Negative for cough, chest tightness and shortness of breath.    Cardiovascular:  Negative for chest pain, palpitations and leg swelling.   Gastrointestinal:  Negative for abdominal pain, blood in stool, diarrhea, nausea and vomiting.   Genitourinary:  Negative for difficulty urinating, dysuria and hematuria.          Objective   Vitals:    10/28/23 0845   BP: 124/68   Pulse: 62   Temp: 97.4 °F (36.3 °C)   TempSrc: Oral   SpO2: 97%   Weight: 72.4 kg (159 lb 9.8 oz)   Height: 5' 3" (1.6 m)       Physical Exam  Constitutional:       General: She is not in acute distress.     Appearance: She is well-developed. She is not diaphoretic.   HENT:      Head: Normocephalic.      Right Ear: External ear normal.      Left Ear: External ear normal.      " Mouth/Throat:      Pharynx: No oropharyngeal exudate.   Eyes:      Conjunctiva/sclera: Conjunctivae normal.   Neck:      Thyroid: No thyromegaly.   Cardiovascular:      Rate and Rhythm: Normal rate and regular rhythm.      Heart sounds: Normal heart sounds. No murmur heard.     No friction rub. No gallop.   Pulmonary:      Effort: Pulmonary effort is normal. No respiratory distress.      Breath sounds: Normal breath sounds. No stridor. No wheezing, rhonchi or rales.   Abdominal:      General: Bowel sounds are normal. There is no distension.      Palpations: Abdomen is soft. There is no mass.      Tenderness: There is no abdominal tenderness. There is no guarding or rebound.      Hernia: No hernia is present.   Musculoskeletal:      Cervical back: Normal range of motion and neck supple.   Lymphadenopathy:      Cervical: No cervical adenopathy.   Skin:     Findings: No rash.   Neurological:      Mental Status: She is alert.   Psychiatric:         Mood and Affect: Mood normal.         Behavior: Behavior normal.            Assessment and Plan     1. Coronary artery disease of native artery of native heart with stable angina pectoris  Overview:  Dr. Skelton    Orders:  -     Comprehensive Metabolic Panel; Future; Expected date: 10/28/2023  -     Lipid Panel; Future; Expected date: 10/28/2023    2. Hyperlipidemia LDL goal <70  -     Comprehensive Metabolic Panel; Future; Expected date: 10/28/2023  -     Lipid Panel; Future; Expected date: 10/28/2023  -     CBC Auto Differential; Future; Expected date: 10/28/2023    3. Primary hypertension  -     Comprehensive Metabolic Panel; Future; Expected date: 10/28/2023  -     Lipid Panel; Future; Expected date: 10/28/2023  -     CBC Auto Differential; Future; Expected date: 10/28/2023    4. Prediabetes  -     Hemoglobin A1C; Future; Expected date: 10/28/2023    5. Muscle cramps  -     CK; Future; Expected date: 10/28/2023    Other orders  -     Influenza - High Dose (65+) (PF)  (IM)                 No follow-ups on file.

## 2023-11-13 ENCOUNTER — TELEPHONE (OUTPATIENT)
Dept: FAMILY MEDICINE | Facility: CLINIC | Age: 78
End: 2023-11-13
Payer: MEDICARE

## 2023-11-13 DIAGNOSIS — Z00.00 ROUTINE GENERAL MEDICAL EXAMINATION AT A HEALTH CARE FACILITY: Primary | ICD-10-CM

## 2023-11-13 DIAGNOSIS — R73.03 PREDIABETES: ICD-10-CM

## 2023-11-13 DIAGNOSIS — R30.0 DYSURIA: ICD-10-CM

## 2023-11-13 NOTE — TELEPHONE ENCOUNTER
----- Message from Jennifer Thibodeaux sent at 11/13/2023  1:43 PM CST -----  Regarding: self [phone]  Type: Lab    Caller is requesting to schedule their Lab appointment prior to annual appointment.    Order is not listed in EPIC.  Please enter order and contact patient to schedule.    Name of Caller:self    Preferred Date and Time of Labs: 11/15 @7am    Date of EPP Appointment: 11/15    Where would they like the lab performed?[facility]    Would the patient rather a call back or a response via [facility]? Call back     Best Call Back Number: [phone]      Additional Information: Urine

## 2023-11-13 NOTE — TELEPHONE ENCOUNTER
----- Message from Jennifer Thibodeaux sent at 11/13/2023  1:43 PM CST -----  Regarding: self [phone]  Type: Lab    Caller is requesting to schedule their Lab appointment prior to annual appointment.    Order is not listed in EPIC.  Please enter order and contact patient to schedule.    Name of Caller:self    Preferred Date and Time of Labs: 11/15 @7am    Date of EPP Appointment: 11/15    Where would they like the lab performed?[facility]    Would the patient rather a call back or a response via [facility]? Call back     Best Call Back Number: [phone]      Additional Information: Urine

## 2023-11-13 NOTE — TELEPHONE ENCOUNTER
Pt requesting a urine order be entered in epic to be added to her labs on Wednesday for 7 am. Please advise.

## 2023-11-13 NOTE — TELEPHONE ENCOUNTER
I added the order    But I don't know if she is asking for a routine order or if she is having uti symptoms    Josee Alvarenga MD

## 2023-11-13 NOTE — TELEPHONE ENCOUNTER
Okay thanks, I added a urine culture to check for infection    If she is having fever, nausea or weakness, she needs to go to urgent care or we can schedule her with an RADHA  I don't see her annual appointment scheduled    Josee Alvarenga MD

## 2023-11-14 ENCOUNTER — HOSPITAL ENCOUNTER (OUTPATIENT)
Dept: RADIOLOGY | Facility: HOSPITAL | Age: 78
Discharge: HOME | End: 2023-11-14
Attending: INTERNAL MEDICINE
Payer: MEDICARE

## 2023-11-14 DIAGNOSIS — C50.919 RECURRENT BREAST CANCER, UNSPECIFIED LATERALITY: ICD-10-CM

## 2023-11-14 DIAGNOSIS — Z85.118 HISTORY OF LUNG CANCER: ICD-10-CM

## 2023-11-14 LAB — POCT GLUCOSE: 102 MG/DL (ref 70–110)

## 2023-11-14 PROCEDURE — 78815 PET IMAGE W/CT SKULL-THIGH: CPT | Mod: 26,PS,HCNC, | Performed by: STUDENT IN AN ORGANIZED HEALTH CARE EDUCATION/TRAINING PROGRAM

## 2023-11-14 PROCEDURE — 78815 NM PET CT ROUTINE: ICD-10-PCS | Mod: 26,PS,HCNC, | Performed by: STUDENT IN AN ORGANIZED HEALTH CARE EDUCATION/TRAINING PROGRAM

## 2023-11-14 PROCEDURE — A9552 F18 FDG: HCPCS | Mod: HCNC

## 2023-11-15 ENCOUNTER — LAB VISIT (OUTPATIENT)
Dept: LAB | Facility: HOSPITAL | Age: 78
End: 2023-11-15
Attending: FAMILY MEDICINE
Payer: MEDICARE

## 2023-11-15 ENCOUNTER — OFFICE VISIT (OUTPATIENT)
Dept: GASTROENTEROLOGY | Facility: CLINIC | Age: 78
End: 2023-11-15
Payer: MEDICARE

## 2023-11-15 VITALS
DIASTOLIC BLOOD PRESSURE: 73 MMHG | HEIGHT: 62 IN | WEIGHT: 158 LBS | HEART RATE: 53 BPM | BODY MASS INDEX: 29.08 KG/M2 | SYSTOLIC BLOOD PRESSURE: 109 MMHG

## 2023-11-15 DIAGNOSIS — R25.2 MUSCLE CRAMPS: ICD-10-CM

## 2023-11-15 DIAGNOSIS — E78.5 HYPERLIPIDEMIA LDL GOAL <70: ICD-10-CM

## 2023-11-15 DIAGNOSIS — R73.03 PREDIABETES: ICD-10-CM

## 2023-11-15 DIAGNOSIS — K31.A0 GASTRIC INTESTINAL METAPLASIA: ICD-10-CM

## 2023-11-15 DIAGNOSIS — K22.0 ACHALASIA: Primary | ICD-10-CM

## 2023-11-15 DIAGNOSIS — I10 PRIMARY HYPERTENSION: ICD-10-CM

## 2023-11-15 DIAGNOSIS — I25.118 CORONARY ARTERY DISEASE OF NATIVE ARTERY OF NATIVE HEART WITH STABLE ANGINA PECTORIS: ICD-10-CM

## 2023-11-15 DIAGNOSIS — R30.0 DYSURIA: ICD-10-CM

## 2023-11-15 PROBLEM — R13.10 DYSPHAGIA: Status: RESOLVED | Noted: 2021-01-25 | Resolved: 2023-11-15

## 2023-11-15 LAB
ALBUMIN SERPL BCP-MCNC: 3.9 G/DL (ref 3.5–5.2)
ALP SERPL-CCNC: 40 U/L (ref 55–135)
ALT SERPL W/O P-5'-P-CCNC: 12 U/L (ref 10–44)
ANION GAP SERPL CALC-SCNC: 10 MMOL/L (ref 8–16)
AST SERPL-CCNC: 17 U/L (ref 10–40)
BACTERIA #/AREA URNS HPF: ABNORMAL /HPF
BASOPHILS # BLD AUTO: 0.05 K/UL (ref 0–0.2)
BASOPHILS NFR BLD: 1 % (ref 0–1.9)
BILIRUB SERPL-MCNC: 0.4 MG/DL (ref 0.1–1)
BILIRUB UR QL STRIP: NEGATIVE
BUN SERPL-MCNC: 14 MG/DL (ref 8–23)
CALCIUM SERPL-MCNC: 9.3 MG/DL (ref 8.7–10.5)
CHLORIDE SERPL-SCNC: 110 MMOL/L (ref 95–110)
CHOLEST SERPL-MCNC: 127 MG/DL (ref 120–199)
CHOLEST/HDLC SERPL: 1.9 {RATIO} (ref 2–5)
CK SERPL-CCNC: 73 U/L (ref 20–180)
CLARITY UR: ABNORMAL
CO2 SERPL-SCNC: 20 MMOL/L (ref 23–29)
COLOR UR: YELLOW
CREAT SERPL-MCNC: 0.8 MG/DL (ref 0.5–1.4)
DIFFERENTIAL METHOD: ABNORMAL
EOSINOPHIL # BLD AUTO: 0.1 K/UL (ref 0–0.5)
EOSINOPHIL NFR BLD: 2.4 % (ref 0–8)
ERYTHROCYTE [DISTWIDTH] IN BLOOD BY AUTOMATED COUNT: 13.4 % (ref 11.5–14.5)
EST. GFR  (NO RACE VARIABLE): >60 ML/MIN/1.73 M^2
ESTIMATED AVG GLUCOSE: 114 MG/DL (ref 68–131)
GLUCOSE SERPL-MCNC: 114 MG/DL (ref 70–110)
GLUCOSE UR QL STRIP: NEGATIVE
HBA1C MFR BLD: 5.6 % (ref 4–5.6)
HCT VFR BLD AUTO: 42.8 % (ref 37–48.5)
HDLC SERPL-MCNC: 66 MG/DL (ref 40–75)
HDLC SERPL: 52 % (ref 20–50)
HGB BLD-MCNC: 13.4 G/DL (ref 12–16)
HGB UR QL STRIP: NEGATIVE
HYALINE CASTS #/AREA URNS LPF: 8 /LPF
IMM GRANULOCYTES # BLD AUTO: 0 K/UL (ref 0–0.04)
IMM GRANULOCYTES NFR BLD AUTO: 0 % (ref 0–0.5)
KETONES UR QL STRIP: NEGATIVE
LDLC SERPL CALC-MCNC: 39.8 MG/DL (ref 63–159)
LEUKOCYTE ESTERASE UR QL STRIP: ABNORMAL
LYMPHOCYTES # BLD AUTO: 1.4 K/UL (ref 1–4.8)
LYMPHOCYTES NFR BLD: 28.8 % (ref 18–48)
MCH RBC QN AUTO: 30 PG (ref 27–31)
MCHC RBC AUTO-ENTMCNC: 31.3 G/DL (ref 32–36)
MCV RBC AUTO: 96 FL (ref 82–98)
MICROSCOPIC COMMENT: ABNORMAL
MONOCYTES # BLD AUTO: 0.4 K/UL (ref 0.3–1)
MONOCYTES NFR BLD: 8.7 % (ref 4–15)
NEUTROPHILS # BLD AUTO: 2.9 K/UL (ref 1.8–7.7)
NEUTROPHILS NFR BLD: 59.1 % (ref 38–73)
NITRITE UR QL STRIP: NEGATIVE
NON-SQ EPI CELLS #/AREA URNS HPF: 1 /HPF
NONHDLC SERPL-MCNC: 61 MG/DL
NRBC BLD-RTO: 0 /100 WBC
PH UR STRIP: 7 [PH] (ref 5–8)
PLATELET # BLD AUTO: 196 K/UL (ref 150–450)
PMV BLD AUTO: 9.7 FL (ref 9.2–12.9)
POTASSIUM SERPL-SCNC: 3.7 MMOL/L (ref 3.5–5.1)
PROT SERPL-MCNC: 6.9 G/DL (ref 6–8.4)
PROT UR QL STRIP: ABNORMAL
RBC # BLD AUTO: 4.46 M/UL (ref 4–5.4)
RBC #/AREA URNS HPF: 1 /HPF (ref 0–4)
SODIUM SERPL-SCNC: 140 MMOL/L (ref 136–145)
SP GR UR STRIP: 1.02 (ref 1–1.03)
SQUAMOUS #/AREA URNS HPF: 12 /HPF
TRIGL SERPL-MCNC: 106 MG/DL (ref 30–150)
URN SPEC COLLECT METH UR: ABNORMAL
UROBILINOGEN UR STRIP-ACNC: ABNORMAL EU/DL
WBC # BLD AUTO: 4.97 K/UL (ref 3.9–12.7)
WBC #/AREA URNS HPF: 38 /HPF (ref 0–5)

## 2023-11-15 PROCEDURE — 3074F PR MOST RECENT SYSTOLIC BLOOD PRESSURE < 130 MM HG: ICD-10-PCS | Mod: HCNC,CPTII,S$GLB, | Performed by: STUDENT IN AN ORGANIZED HEALTH CARE EDUCATION/TRAINING PROGRAM

## 2023-11-15 PROCEDURE — 3288F PR FALLS RISK ASSESSMENT DOCUMENTED: ICD-10-PCS | Mod: HCNC,CPTII,S$GLB, | Performed by: STUDENT IN AN ORGANIZED HEALTH CARE EDUCATION/TRAINING PROGRAM

## 2023-11-15 PROCEDURE — 1157F ADVNC CARE PLAN IN RCRD: CPT | Mod: HCNC,CPTII,S$GLB, | Performed by: STUDENT IN AN ORGANIZED HEALTH CARE EDUCATION/TRAINING PROGRAM

## 2023-11-15 PROCEDURE — 99999 PR PBB SHADOW E&M-EST. PATIENT-LVL III: CPT | Mod: PBBFAC,HCNC,, | Performed by: STUDENT IN AN ORGANIZED HEALTH CARE EDUCATION/TRAINING PROGRAM

## 2023-11-15 PROCEDURE — 3288F FALL RISK ASSESSMENT DOCD: CPT | Mod: HCNC,CPTII,S$GLB, | Performed by: STUDENT IN AN ORGANIZED HEALTH CARE EDUCATION/TRAINING PROGRAM

## 2023-11-15 PROCEDURE — 3078F PR MOST RECENT DIASTOLIC BLOOD PRESSURE < 80 MM HG: ICD-10-PCS | Mod: HCNC,CPTII,S$GLB, | Performed by: STUDENT IN AN ORGANIZED HEALTH CARE EDUCATION/TRAINING PROGRAM

## 2023-11-15 PROCEDURE — 80061 LIPID PANEL: CPT | Mod: HCNC | Performed by: FAMILY MEDICINE

## 2023-11-15 PROCEDURE — 1157F PR ADVANCE CARE PLAN OR EQUIV PRESENT IN MEDICAL RECORD: ICD-10-PCS | Mod: HCNC,CPTII,S$GLB, | Performed by: STUDENT IN AN ORGANIZED HEALTH CARE EDUCATION/TRAINING PROGRAM

## 2023-11-15 PROCEDURE — 1101F PR PT FALLS ASSESS DOC 0-1 FALLS W/OUT INJ PAST YR: ICD-10-PCS | Mod: HCNC,CPTII,S$GLB, | Performed by: STUDENT IN AN ORGANIZED HEALTH CARE EDUCATION/TRAINING PROGRAM

## 2023-11-15 PROCEDURE — 87086 URINE CULTURE/COLONY COUNT: CPT | Mod: HCNC | Performed by: FAMILY MEDICINE

## 2023-11-15 PROCEDURE — 36415 COLL VENOUS BLD VENIPUNCTURE: CPT | Mod: HCNC | Performed by: FAMILY MEDICINE

## 2023-11-15 PROCEDURE — 83036 HEMOGLOBIN GLYCOSYLATED A1C: CPT | Mod: HCNC | Performed by: FAMILY MEDICINE

## 2023-11-15 PROCEDURE — 1159F PR MEDICATION LIST DOCUMENTED IN MEDICAL RECORD: ICD-10-PCS | Mod: HCNC,CPTII,S$GLB, | Performed by: STUDENT IN AN ORGANIZED HEALTH CARE EDUCATION/TRAINING PROGRAM

## 2023-11-15 PROCEDURE — 99214 PR OFFICE/OUTPT VISIT, EST, LEVL IV, 30-39 MIN: ICD-10-PCS | Mod: HCNC,S$GLB,, | Performed by: STUDENT IN AN ORGANIZED HEALTH CARE EDUCATION/TRAINING PROGRAM

## 2023-11-15 PROCEDURE — 3078F DIAST BP <80 MM HG: CPT | Mod: HCNC,CPTII,S$GLB, | Performed by: STUDENT IN AN ORGANIZED HEALTH CARE EDUCATION/TRAINING PROGRAM

## 2023-11-15 PROCEDURE — 82550 ASSAY OF CK (CPK): CPT | Mod: HCNC | Performed by: FAMILY MEDICINE

## 2023-11-15 PROCEDURE — 99214 OFFICE O/P EST MOD 30 MIN: CPT | Mod: HCNC,S$GLB,, | Performed by: STUDENT IN AN ORGANIZED HEALTH CARE EDUCATION/TRAINING PROGRAM

## 2023-11-15 PROCEDURE — 81000 URINALYSIS NONAUTO W/SCOPE: CPT | Mod: HCNC | Performed by: FAMILY MEDICINE

## 2023-11-15 PROCEDURE — 1126F AMNT PAIN NOTED NONE PRSNT: CPT | Mod: HCNC,CPTII,S$GLB, | Performed by: STUDENT IN AN ORGANIZED HEALTH CARE EDUCATION/TRAINING PROGRAM

## 2023-11-15 PROCEDURE — 1159F MED LIST DOCD IN RCRD: CPT | Mod: HCNC,CPTII,S$GLB, | Performed by: STUDENT IN AN ORGANIZED HEALTH CARE EDUCATION/TRAINING PROGRAM

## 2023-11-15 PROCEDURE — 1101F PT FALLS ASSESS-DOCD LE1/YR: CPT | Mod: HCNC,CPTII,S$GLB, | Performed by: STUDENT IN AN ORGANIZED HEALTH CARE EDUCATION/TRAINING PROGRAM

## 2023-11-15 PROCEDURE — 1126F PR PAIN SEVERITY QUANTIFIED, NO PAIN PRESENT: ICD-10-PCS | Mod: HCNC,CPTII,S$GLB, | Performed by: STUDENT IN AN ORGANIZED HEALTH CARE EDUCATION/TRAINING PROGRAM

## 2023-11-15 PROCEDURE — 80053 COMPREHEN METABOLIC PANEL: CPT | Mod: HCNC | Performed by: FAMILY MEDICINE

## 2023-11-15 PROCEDURE — 99999 PR PBB SHADOW E&M-EST. PATIENT-LVL III: ICD-10-PCS | Mod: PBBFAC,HCNC,, | Performed by: STUDENT IN AN ORGANIZED HEALTH CARE EDUCATION/TRAINING PROGRAM

## 2023-11-15 PROCEDURE — 3074F SYST BP LT 130 MM HG: CPT | Mod: HCNC,CPTII,S$GLB, | Performed by: STUDENT IN AN ORGANIZED HEALTH CARE EDUCATION/TRAINING PROGRAM

## 2023-11-15 PROCEDURE — 85025 COMPLETE CBC W/AUTO DIFF WBC: CPT | Mod: HCNC | Performed by: FAMILY MEDICINE

## 2023-11-15 NOTE — PROGRESS NOTES
[facility]    Reason for visit: There were no encounter diagnoses.  Referring Provider/PCP: Jeannine Huitron MD    History of Present Illness:  Ade Castellano is a 78 y.o. female with a history of COPD, CAD, breast cancer '15, lung cancer s/p chemo/radiation  who is presenting for follow-up evaluation of achalasia.    She was previously seen by multiple GI providers in our department, most recently by Dr. Phillips.  She had an EGD in 2022 that showed a hypertonic lower esophageal sphincter and a corkscrew esophagus, she was dilated to 48 Hebrew with a Savary dilator with some improvement of her symptoms.  Esophageal manometry was positive for type 3 achalasia with certain features of type 2 achalasia.  When she was seen by Dr. Phillips in July, the diagnosis of achalasia was discussed and the patient refused any surgical interventions due to her age and other comorbidities so she elected for EGD with Botox injections to the LES.  This was performed in September 2023, images reviewed, she had a hypertonic lower esophageal sphincter with Botox injection.  The stomach showed irregular nodular mucosa with biopsies positive for complete gastric intestinal metaplasia.    Today, the patient reports that her symptoms are significantly improved if not completely resolved.  Her Eckardt score is 1-2. Dysphagia 1, regurgitation 0, chest pain 0, weight loss 0-1 (intermittent weight fluctuations). She is still not interested in any surgical interventions.      Physical Exam:  Constitutional:  not in acute distress and well developed  HENT: Head: Normal, normocephalic, atraumatic.  Eyes: conjunctiva clear and sclera nonicteric  Skin: normal color  Neurological: alert, oriented x3  Psychiatric: mood and affect are within normal limits, pt is a good historian; no memory problems were noted    Laboratory:  See above    Imaging:  Imaging reviewed: FL esophagram. Interpretation: narrowing at the LES,  eventual passage of barium    Endoscopy:  Colonoscopy 2022, 7 small polyps removed - tubular adenomas.  EGD 9/2023 - hypertonic LES, injected with botox    Assessment:  Ade Castellano is a 78 y.o. female who is presenting for follow-up evaluation of achalasia    Problems:  Type 3 achalasia  Complete gastric intestinal metaplasia    The patient has type 3 achalasia with features of type 2 achalasia, is not interested in any surgeries at this time.  She improved significantly with Botox injections with an Eckardt score of 1 at this time.  Explained that most people relapse after a period of time, she is okay with getting repeat Botox injections.  Will plan for follow-up in 6 months and repeat EGD as needed.    Discussed gastric intestinal metaplasia, given her history of multiple cancers and her concern about developing no cancer, she is interested in surveillance.  Will repeat EGD in 3 years for surveillance.      Plan:  EGD in 3 years in surveillance of [facility]  EGD with botox injections PRN  Continue protonix 40mg daily  F/u in 6 months or longer if symptoms still under control    Bria Posadas MD  Gastroenterology and Hepatology    No orders of the defined types were placed in this encounter.

## 2023-11-16 ENCOUNTER — TELEPHONE (OUTPATIENT)
Dept: FAMILY MEDICINE | Facility: CLINIC | Age: 78
End: 2023-11-16
Payer: MEDICARE

## 2023-11-17 ENCOUNTER — TELEPHONE (OUTPATIENT)
Dept: FAMILY MEDICINE | Facility: CLINIC | Age: 78
End: 2023-11-17
Payer: MEDICARE

## 2023-11-17 LAB — BACTERIA UR CULT: NORMAL

## 2023-11-17 NOTE — TELEPHONE ENCOUNTER
----- Message from Jeannine Huitron MD sent at 11/17/2023  1:19 PM CST -----    Your labs are within normal limits.

## 2023-11-17 NOTE — TELEPHONE ENCOUNTER
"Called and informed pt of provider's results below. Pt verbalized understanding.   Pt also inquired about her urine result. I informed her of Dr. Alvarenga's result notes from 11/16/2023 that stated "Please let her know her urine culture is negative for signs of infection." Pt verbalized understanding of this result also.   "

## 2023-12-05 ENCOUNTER — TELEPHONE (OUTPATIENT)
Dept: HEMATOLOGY/ONCOLOGY | Facility: CLINIC | Age: 78
End: 2023-12-05
Payer: MEDICARE

## 2023-12-12 ENCOUNTER — INFUSION (OUTPATIENT)
Dept: INFUSION THERAPY | Facility: HOSPITAL | Age: 78
End: 2023-12-12
Attending: INTERNAL MEDICINE
Payer: MEDICARE

## 2023-12-12 ENCOUNTER — OFFICE VISIT (OUTPATIENT)
Dept: HEMATOLOGY/ONCOLOGY | Facility: CLINIC | Age: 78
End: 2023-12-12
Payer: MEDICARE

## 2023-12-12 ENCOUNTER — HOSPITAL ENCOUNTER (OUTPATIENT)
Dept: RADIOLOGY | Facility: HOSPITAL | Age: 78
Discharge: HOME | End: 2023-12-12
Attending: INTERNAL MEDICINE
Payer: MEDICARE

## 2023-12-12 VITALS
HEART RATE: 60 BPM | HEIGHT: 63 IN | DIASTOLIC BLOOD PRESSURE: 77 MMHG | BODY MASS INDEX: 27.62 KG/M2 | SYSTOLIC BLOOD PRESSURE: 117 MMHG | OXYGEN SATURATION: 98 % | WEIGHT: 155.88 LBS

## 2023-12-12 VITALS
SYSTOLIC BLOOD PRESSURE: 152 MMHG | HEART RATE: 52 BPM | OXYGEN SATURATION: 98 % | RESPIRATION RATE: 18 BRPM | DIASTOLIC BLOOD PRESSURE: 72 MMHG

## 2023-12-12 DIAGNOSIS — J44.9 CHRONIC OBSTRUCTIVE PULMONARY DISEASE, UNSPECIFIED COPD TYPE: ICD-10-CM

## 2023-12-12 DIAGNOSIS — Z85.118 HISTORY OF LUNG CANCER: ICD-10-CM

## 2023-12-12 DIAGNOSIS — C50.919 RECURRENT BREAST CANCER, UNSPECIFIED LATERALITY: ICD-10-CM

## 2023-12-12 DIAGNOSIS — R05.9 COUGH, UNSPECIFIED TYPE: ICD-10-CM

## 2023-12-12 DIAGNOSIS — C50.919 RECURRENT BREAST CANCER, UNSPECIFIED LATERALITY: Primary | ICD-10-CM

## 2023-12-12 DIAGNOSIS — C34.90 SQUAMOUS CELL CARCINOMA LUNG: Primary | ICD-10-CM

## 2023-12-12 DIAGNOSIS — R79.9 ABNORMAL FINDING OF BLOOD CHEMISTRY, UNSPECIFIED: ICD-10-CM

## 2023-12-12 DIAGNOSIS — R79.89 OTHER SPECIFIED ABNORMAL FINDINGS OF BLOOD CHEMISTRY: ICD-10-CM

## 2023-12-12 LAB
ALBUMIN SERPL BCP-MCNC: 3.8 G/DL (ref 3.5–5.2)
ALP SERPL-CCNC: 44 U/L (ref 55–135)
ALT SERPL W/O P-5'-P-CCNC: 21 U/L (ref 10–44)
ANION GAP SERPL CALC-SCNC: 5 MMOL/L (ref 8–16)
AST SERPL-CCNC: 24 U/L (ref 10–40)
BASOPHILS # BLD AUTO: 0.06 K/UL (ref 0–0.2)
BASOPHILS NFR BLD: 1.3 % (ref 0–1.9)
BILIRUB SERPL-MCNC: 0.5 MG/DL (ref 0.1–1)
BUN SERPL-MCNC: 14 MG/DL (ref 8–23)
CALCIUM SERPL-MCNC: 8.7 MG/DL (ref 8.7–10.5)
CHLORIDE SERPL-SCNC: 108 MMOL/L (ref 95–110)
CO2 SERPL-SCNC: 28 MMOL/L (ref 23–29)
CREAT SERPL-MCNC: 0.8 MG/DL (ref 0.5–1.4)
DIFFERENTIAL METHOD: ABNORMAL
EOSINOPHIL # BLD AUTO: 0.2 K/UL (ref 0–0.5)
EOSINOPHIL NFR BLD: 3.3 % (ref 0–8)
ERYTHROCYTE [DISTWIDTH] IN BLOOD BY AUTOMATED COUNT: 13.5 % (ref 11.5–14.5)
EST. GFR  (NO RACE VARIABLE): >60 ML/MIN/1.73 M^2
FERRITIN SERPL-MCNC: 280 NG/ML (ref 20–300)
GLUCOSE SERPL-MCNC: 117 MG/DL (ref 70–110)
HCT VFR BLD AUTO: 39.8 % (ref 37–48.5)
HGB BLD-MCNC: 12.6 G/DL (ref 12–16)
IMM GRANULOCYTES # BLD AUTO: 0.02 K/UL (ref 0–0.04)
IMM GRANULOCYTES NFR BLD AUTO: 0.4 % (ref 0–0.5)
IRON SERPL-MCNC: 84 UG/DL (ref 30–160)
LYMPHOCYTES # BLD AUTO: 1.3 K/UL (ref 1–4.8)
LYMPHOCYTES NFR BLD: 27.9 % (ref 18–48)
MCH RBC QN AUTO: 29.6 PG (ref 27–31)
MCHC RBC AUTO-ENTMCNC: 31.7 G/DL (ref 32–36)
MCV RBC AUTO: 93 FL (ref 82–98)
MONOCYTES # BLD AUTO: 0.4 K/UL (ref 0.3–1)
MONOCYTES NFR BLD: 8.8 % (ref 4–15)
NEUTROPHILS # BLD AUTO: 2.7 K/UL (ref 1.8–7.7)
NEUTROPHILS NFR BLD: 58.3 % (ref 38–73)
NRBC BLD-RTO: 0 /100 WBC
PLATELET # BLD AUTO: 159 K/UL (ref 150–450)
PMV BLD AUTO: 12.3 FL (ref 9.2–12.9)
POTASSIUM SERPL-SCNC: 3.5 MMOL/L (ref 3.5–5.1)
PROT SERPL-MCNC: 6.5 G/DL (ref 6–8.4)
RBC # BLD AUTO: 4.26 M/UL (ref 4–5.4)
SATURATED IRON: 26 % (ref 20–50)
SODIUM SERPL-SCNC: 141 MMOL/L (ref 136–145)
TOTAL IRON BINDING CAPACITY: 324 UG/DL (ref 250–450)
TRANSFERRIN SERPL-MCNC: 219 MG/DL (ref 200–375)
WBC # BLD AUTO: 4.56 K/UL (ref 3.9–12.7)

## 2023-12-12 PROCEDURE — 99214 OFFICE O/P EST MOD 30 MIN: CPT | Mod: HCNC,S$GLB,, | Performed by: INTERNAL MEDICINE

## 2023-12-12 PROCEDURE — 71045 X-RAY EXAM CHEST 1 VIEW: CPT | Mod: 26,HCNC,, | Performed by: RADIOLOGY

## 2023-12-12 PROCEDURE — 25000003 PHARM REV CODE 250: Mod: HCNC | Performed by: INTERNAL MEDICINE

## 2023-12-12 PROCEDURE — 3288F FALL RISK ASSESSMENT DOCD: CPT | Mod: HCNC,CPTII,S$GLB, | Performed by: INTERNAL MEDICINE

## 2023-12-12 PROCEDURE — 1126F PR PAIN SEVERITY QUANTIFIED, NO PAIN PRESENT: ICD-10-PCS | Mod: HCNC,CPTII,S$GLB, | Performed by: INTERNAL MEDICINE

## 2023-12-12 PROCEDURE — 1101F PT FALLS ASSESS-DOCD LE1/YR: CPT | Mod: HCNC,CPTII,S$GLB, | Performed by: INTERNAL MEDICINE

## 2023-12-12 PROCEDURE — 99999 PR PBB SHADOW E&M-EST. PATIENT-LVL IV: ICD-10-PCS | Mod: PBBFAC,HCNC,, | Performed by: INTERNAL MEDICINE

## 2023-12-12 PROCEDURE — 80053 COMPREHEN METABOLIC PANEL: CPT | Mod: HCNC | Performed by: INTERNAL MEDICINE

## 2023-12-12 PROCEDURE — 3074F PR MOST RECENT SYSTOLIC BLOOD PRESSURE < 130 MM HG: ICD-10-PCS | Mod: HCNC,CPTII,S$GLB, | Performed by: INTERNAL MEDICINE

## 2023-12-12 PROCEDURE — A4216 STERILE WATER/SALINE, 10 ML: HCPCS | Mod: HCNC | Performed by: INTERNAL MEDICINE

## 2023-12-12 PROCEDURE — 3074F SYST BP LT 130 MM HG: CPT | Mod: HCNC,CPTII,S$GLB, | Performed by: INTERNAL MEDICINE

## 2023-12-12 PROCEDURE — 63600175 PHARM REV CODE 636 W HCPCS: Mod: HCNC | Performed by: INTERNAL MEDICINE

## 2023-12-12 PROCEDURE — 99214 PR OFFICE/OUTPT VISIT, EST, LEVL IV, 30-39 MIN: ICD-10-PCS | Mod: HCNC,S$GLB,, | Performed by: INTERNAL MEDICINE

## 2023-12-12 PROCEDURE — 85025 COMPLETE CBC W/AUTO DIFF WBC: CPT | Mod: HCNC | Performed by: INTERNAL MEDICINE

## 2023-12-12 PROCEDURE — 3078F DIAST BP <80 MM HG: CPT | Mod: HCNC,CPTII,S$GLB, | Performed by: INTERNAL MEDICINE

## 2023-12-12 PROCEDURE — 1159F PR MEDICATION LIST DOCUMENTED IN MEDICAL RECORD: ICD-10-PCS | Mod: HCNC,CPTII,S$GLB, | Performed by: INTERNAL MEDICINE

## 2023-12-12 PROCEDURE — 3288F PR FALLS RISK ASSESSMENT DOCUMENTED: ICD-10-PCS | Mod: HCNC,CPTII,S$GLB, | Performed by: INTERNAL MEDICINE

## 2023-12-12 PROCEDURE — 84466 ASSAY OF TRANSFERRIN: CPT | Mod: HCNC | Performed by: INTERNAL MEDICINE

## 2023-12-12 PROCEDURE — 71045 XR CHEST 1 VIEW: ICD-10-PCS | Mod: 26,HCNC,, | Performed by: RADIOLOGY

## 2023-12-12 PROCEDURE — 1157F ADVNC CARE PLAN IN RCRD: CPT | Mod: HCNC,CPTII,S$GLB, | Performed by: INTERNAL MEDICINE

## 2023-12-12 PROCEDURE — 1157F PR ADVANCE CARE PLAN OR EQUIV PRESENT IN MEDICAL RECORD: ICD-10-PCS | Mod: HCNC,CPTII,S$GLB, | Performed by: INTERNAL MEDICINE

## 2023-12-12 PROCEDURE — 1126F AMNT PAIN NOTED NONE PRSNT: CPT | Mod: HCNC,CPTII,S$GLB, | Performed by: INTERNAL MEDICINE

## 2023-12-12 PROCEDURE — 99999 PR PBB SHADOW E&M-EST. PATIENT-LVL IV: CPT | Mod: PBBFAC,HCNC,, | Performed by: INTERNAL MEDICINE

## 2023-12-12 PROCEDURE — 3078F PR MOST RECENT DIASTOLIC BLOOD PRESSURE < 80 MM HG: ICD-10-PCS | Mod: HCNC,CPTII,S$GLB, | Performed by: INTERNAL MEDICINE

## 2023-12-12 PROCEDURE — 83540 ASSAY OF IRON: CPT | Mod: HCNC | Performed by: INTERNAL MEDICINE

## 2023-12-12 PROCEDURE — 82728 ASSAY OF FERRITIN: CPT | Mod: HCNC | Performed by: INTERNAL MEDICINE

## 2023-12-12 PROCEDURE — 1101F PR PT FALLS ASSESS DOC 0-1 FALLS W/OUT INJ PAST YR: ICD-10-PCS | Mod: HCNC,CPTII,S$GLB, | Performed by: INTERNAL MEDICINE

## 2023-12-12 PROCEDURE — 1159F MED LIST DOCD IN RCRD: CPT | Mod: HCNC,CPTII,S$GLB, | Performed by: INTERNAL MEDICINE

## 2023-12-12 PROCEDURE — 36591 DRAW BLOOD OFF VENOUS DEVICE: CPT | Mod: HCNC

## 2023-12-12 PROCEDURE — 71045 X-RAY EXAM CHEST 1 VIEW: CPT | Mod: TC,HCNC,FY

## 2023-12-12 RX ORDER — HEPARIN 100 UNIT/ML
500 SYRINGE INTRAVENOUS
Status: DISCONTINUED | OUTPATIENT
Start: 2023-12-12 | End: 2023-12-12 | Stop reason: HOSPADM

## 2023-12-12 RX ORDER — SODIUM CHLORIDE 0.9 % (FLUSH) 0.9 %
10 SYRINGE (ML) INJECTION
Status: DISCONTINUED | OUTPATIENT
Start: 2023-12-12 | End: 2023-12-12 | Stop reason: HOSPADM

## 2023-12-12 RX ADMIN — Medication 10 ML: at 08:12

## 2023-12-12 RX ADMIN — HEPARIN 500 UNITS: 100 SYRINGE at 08:12

## 2023-12-12 NOTE — PROGRESS NOTES
Subjective:       Patient ID: Ade Castellano is a 78 y.o. female.    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 The PET(+) disease in the right axilla and right supraclavicular fossa will be boosted at 200 cGy per fraction to 6000 cGy total dose.  S/p LYMPH NODE, RIGHT AXILLA, BIOPSY: 6/16/21 . Metastatic poorly-differentiated carcinoma, c/w breast primary ERnegPRneg Her2 neg  PD-L1 (22C3) IHC CPS> is greater than or equal to 10  Pembrolizumab 7/22/21 -6/15/22         Prior Hx:  79 y/o female with history of  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  And Rt  breast Haja/p  cycle 6 of carboplatin/Abraxane 7/2/2018   She has  History of Stage 1A  Rt breast cancer Grade 3, ER/SD neg , Her 2 jose maria neg s/p lumpectomy 7/11/2014 and s/p adjuvant RT 9/2014 Pt declined Adjuvant chemo.Pathology showed a 1.15 cm, grade 3 infiltrating ductal carcinoma.  One sentinel lymph node was negative for malignancy.  Margins were negative and the closest margin was 1 cm.  She was staged  pT1c  pN0 cM0 stage IA.  She declined adjuvant chemo therapy.  She completed post operative radiation therapy at 400 cGy per fraction to 4000 cGy 9/2014  Pt hospitalized 11/2017 for  acute on chronic respiratory failure requiring intubation and ventilation. Has large lung mass in right lung with probable post obstructive pneumonia resulting in COPD exacerbation.CTA  chest 11/29/2017 revealed   Large right hilar mass with involvement of adjacent segmental pulmonary arterial branches and bronchi with associated postobstructive atelectasis and volume loss in the RML  with adjacent ground glass opacity concerning for underlying neoplastic process. Mediastinal and axillary adenopathy, with a right axillary lymph node measuring up to 2.0 cm in short axis diameter.She underwent rt axillary LN bx at outside facility- on 1/16/2018 at [facility]. Pathology revealed metastatic poorly differentiated carcinoma of unknown primary site. She next underwent lung bx at [facility] 1/31/2018  benign lung tissue. Repeat Right Lung Bx 2/14/2018 Pathology reveals Squamous cell carcinoma PD-L1 10% low expression EGFR NEG ALK NEG BRAF NEG. Outside slides axillary LN specimen were reviewed/comparison to lung bx findings . She completed    cycle 6 of carboplatin/Abraxane completed 7/2018 .PET/CT  4/19/2018 revealed there has been a excellent response to therapy.  At least 90% reduction in malignant activity.PET/CT 2/21/2019 increased size and irregularity of the right axillary lymph node with increased FDG avidityMAMMO/US rt breast 2/2019 revealed suspicious findings rt axilla LN.  Right axilla, mass, core biopsy: Positive for poorly differentiated carcinoma breast primary ER neg/TX neg Her2 neg.[facility] reviewIt was determined  the lung tumor corresponds to a squamous cellcarcinoma and appears to be unrelated to the invasive ductal carcinoma of the breast. The axillary mass, in myopinion, corresponds to metastases of the breast primary. Although it is a poorly differentiated carcinoma, theimmunophenotype is most consistent with the one that the breast primary exhibited, and is inconsistent with metastatic squamous cell carcinoma. She was treated with  AC ( adriamycin 60m/m2/Cytoxan 600mg/m2)  q21d x 4 cycles completed 9/6/2019 . PET/CT 9/19/2019 shows disease response l decrease in  size and uptake of several right axillary lymph nodes and a supraclavicular lymph node.  Mild residual activity may indicate viable tumor.Pt followed by Rad/Onc. She was presented at [facility] tumor board and it was determined to proceed Radiation therapy only. No ALND due to concurrent lung and supraclavicular node. She  completed  RT 12/16/2019  To right axilla and right supraclavicular region PET/CT imaging  5/20/21 shows Continued increase in both size and hypermetabolic activity of right axillary level 1 lymph nodes a new, mildly hypermetabolic cutaneous thickening of the right breast. she underwent LYMPH NODE, RIGHT AXILLA, BIOPSY: 6/16/21 . Pathology showed Metastatic poorly-differentiated carcinoma, consistent with breast primary-ERneg/ PRneg  HER2  neg  Pt started on pembrolizumab 7/2021  Pt hospitalized 4/6/22 with hypokalemia and orthostatic hypotension  S/p C16 pembrolizumab 6/15/22  ( 400mg q6wks)      Interval    She reports worsening  cough  Prescribed antitussives not effective  She continues with mild LOGAN-stable  She is f/b pulmonology, Dr. Katz  Dysphagia improved s/p  dilation   No fevers  Appetite and weight stable  She has supp 02 at home       PET /CT ( NOT DOTATATE) 4/14/23 No opacity in the left lower lobe on the current exam to correspond to a left lower lobe opacity described on the 7/13/2022 PET CT report.  Images from the 7/13/2022 PET CT are not available at the time of this report.   Cutaneous thickening in the right breast with mild uptake.  There was a similar finding described on the 71/3/2022 PET CT report.                PrevHx:She had an abnormal mammogram 6/4/2014 whichRevealed a round solid mass 6mm in rt breast.She then underwent U/S guided core bx of rt breast mass on 6/17/2014.Pathology revealed infiltrating ductal carcinoma Grade 3 with tumorPresent in thin-walled spaces suggestive of lymphatic spaces. HormoneReceptor status on tumor specimen revealed ER negative 0%  ",NE negative 0% and Her 2 jose maria negative.She subsequently underwent rt segmental mastectomy and SLN bxOn 7/11/2014. Pathology of rt breast lumpectomy revealed invasiveDuctal carcinoma with micropapillary pattern ( Invasive micropapillaryCa) with max tumor dimension 11.5mm with suggestion of tumor in Thin walled spaces c/w lymphovascular involvement. SurgicalMargins free of tumor, grade 3, ER/NE neg , Her 2 jose maria neg With [place] Lymph node negative for Neoplasm. Pathologic staging [id number](i-)Her mother was diagnosed with breast cancer in her 50's/        Review of Systems   Constitutional: Positive for mild  fatigue. No  activity change,  fever.   HENT: Positive for dysphagia. Negative for mouth sores, rhinorrhea.   Eyes: Negative for visual disturbance.   Respiratory: worsening cough Positive for chronic  LOGAN Negative for wheezing.    Cardiovascular: Negative for chest pain.   Gastrointestinal:  Negative for abdominal pain and diarrhea.   Genitourinary: Negative for frequency.   Musculoskeletal: Negative for back pain.   Skin: Negative for rash.   Neurological: Negative for dizziness and headaches.   Hematological: Negative for adenopathy.   Psychiatric/Behavioral: The patient is not nervous/anxious.        Objective:        Vitals:    12/12/23 1007   BP: 117/77   Pulse: 60   SpO2: 98%   Weight: 70.7 kg (155 lb 13.8 oz)   Height: 5' 3" (1.6 m)             Physical Exam   Constitutional: She is oriented to person, place, and time. She appears ill, coughing episodes  HENT:   Head: Normocephalic.   Eyes: Conjunctivae and lids are normal.No scleral icterus.   Neck: Normal range of motion. Neck supple. No thyromegaly present.   Cardiovascular: Normal rate, regular rhythm and normal heart sounds.   murmur heard.  Pulmonary/Chest: Breath sounds normal. She has no wheezes. She has no rales.   Abdominal: Soft. Bowel sounds are normal.  There is no tenderness. There is no rebound and no guarding.   Left breast- no masses or " axillary LAD noted  Right breast- breast thickening inner quad    She has no cervical adenopathy.     She has rt axillary adenopathy.        Right: No supraclavicular adenopathy present.        Left: No supraclavicular adenopathy present.   Neurological: She is alert and oriented to person, place, and time. No cranial nerve deficit. Coordination normal.   Skin: Skin is warm and dry. No ecchymosis, no petechiae and no rash noted. No erythema.   Psychiatric: She has a normal mood and affect.             CT a/p w/contrast 11/29/2018   1. No acute abnormality identified within the abdomen and pelvis.    2.  There are a few nonspecific prominent lymph nodes in the upper abdomen including a periesophageal lymph node which measures 1.0 cm in short axis diameter.    3.  Significant abdominal aortic atherosclerosis and abdominal aorta ectasia.    4.  Additional findings as above.    PET/CT 1/26/2018   1.  Intense FDG uptake within the known right infrahilar lung mass, compatible with malignancy.  It is unclear if this represents primary lung malignancy or metastatic breast cancer.    2.  Intensely hypermetabolic right axillary, retropectoral, and lower right cervical lymph nodes, compatible with metastases.    3.  Abnormal FDG uptake along right breast skin thickening, new from prior chest CTA and mammogram.  This may relate to localized edema/inflammation, though correlation with physical exam and mammography are recommended to exclude underlying inflammatory carcinoma.      MRI Brain w w/out contrast 2/9/2018  1.  No evidence of intracranial metastases.  2.  Sinus disease       Rt axillary LN bx at outside facility- on 1/16/2018 at [facility]  Pathology revealed metastatic poorly differentiated carcinoma of unknown primary  site 1/16/2018 at [facility]  TTF-1 negative, napsin negative  , cytokeratin 7 positive, cytokeratin 20 negative, p63 negative, cytokeratin 5/6 focal dim positivity    LUNG  BIOPSY 1/31/2018  Pathology revealed benign lung tissue         SPECIMEN  1) Right Lung Bx 2/14/2018  Supplemental Diagnosis  Immunohistochemical stains show strong nuclear staining for p63 in essentially all tumor cells and very strong  cytoplasmic and membrane staining for CK5/6, also in essentially all tumor cells. TTF-1 and CK7 are negative  within tumor cells but do stain native pulmonary elements present within the biopsy. A stain for mucicarmine is  negative. Positive and negative controls function appropriately.  Final diagnosis: Specimen submitted as right lung biopsy  -Squamous cell carcinoma  (Electronically Signed: 2018-02-20 09:12:07 )  Diagnosed by: Phong Thompson  FINAL PATHOLOGIC DIAGNOSIS  Fragments of pulmonary parenchyma (submitted as right lung biopsy):    FINAL PATHOLOGIC DIAGNOSIS 1. Lymph node, right axilla, biopsy, review of 10 outside slides [facility], JS [phone],  collected January 11, 2018: Metastatic poorly differentiated carcinoma (see comment).  The histologic section shows fibrous stroma infiltrated by nest of poorly differentiated malignant cells including  occasional dyskeratotic cells morphologically suggestive of metastatic squamous cell carcinoma.  Immunohistochemical stains are nonspecific; the cells are positive for cytokeratin 7 and cytokeratin 5/6 (focal) and  negative for cytokeratin 20, TTF-1, p63, GCDFP, mammoglobin, and CEA        PET/CT 2/21/2019  Increased size and irregularity of the right axillary lymph node with increased FDG avidity.  Although this would be atypical in location for lung carcinoma, the increased size and avidity must be concerning for metastatic disease in this patient with history of squamous cell lung cancer.     US Rt breast and mammo 2/26/2019  Impression:  Right  Lymph Node: Right axilla lymph node. Assessment: 4 - Suspicious finding. Biopsy is recommended.      BI-RADS Category:   Right: 4 - Suspicious  Overall: 4 -  Suspicious     Recommendation:  Biopsy is recommended. Biopsy of the lymph node measuring 1.4 x 1.1 x 1.3 recommended , the one with the round shape configuration, corresponding to the most recent PET CT finding.         Pathology 3/11/2019   FINAL PATHOLOGIC DIAGNOSIS  Right axilla, mass, core biopsy:  Positive for poorly differentiated carcinoma.  See comment.  Comment:  The biopsy from the right axilla mass shows a poorly differentiated carcinoma in background lymphoid tissue  with enlarged cells showing increased nuclear size, prominent nucleoli, moderate amounts of cytoplasm and mitotic  figures. Immunostains are completed and reveal the tumor cells to stain positively with cytokeratin AE 1/AE3, CK  5/6 and CK 7. The tumor cells are negative for CK 20, Estrogen receptor, p63 and TTF-1. All stains have  satisfactory positive and negative controls. The patient's prior cases will be reviewed and an additional stain for  JUAREZ-3 is pending in an attempt to pinpoint the primary site of this malignancy and results will follow in a  supplemental report.      Supplemental Diagnosis  3/11/2019  The current axillary mass is compared to the patient's prior right lung biopsy (case number [id number]) and the  current axillary mass is morphologically similar to the lung malignancy. Also, the current axillary mass is compared  to the patient's prior breast resection (Case number [id number]) and appears similar as well. P63 is negative in the  [id number] tumor and CK 5/6 shows scattered positive staining in the [id number] tumor.  Immunostain for JUAREZ-3 is completed and shows only rare weak to moderate staining within the tumor cell nuclei  with satisfactory positive and negative controls. This stain is positive in the surrounding lymphocytes. This staining  pattern is non-specific and does not definitively differentiate between a lung and breast primary malignancy in this  right axilla mass biopsy. The staining profile of the  current axillary mass match more closely with the previous  breast carcinoma (due to the p63 negativity in both the 2014 breast cancer and the current axillary mass lesion but  p63 positivity in the lung mass from 2018).  This staining profile, together with the comparison with the patient's prior breast tumor and prior lung tumor supports  a diagnosis of poorly differentiated carcinoma and the malignancy appears morphologically similar to the prior  malignancies from both sites, but the staining profile in this small sample from the axillary mass more closely  correlates with the prior breast malignancy. Pathologic subclassification of this malignancy's primary location is not  definitive and clinical correlation is recommended definitively decide on possible primary location in this patient's  axillary mass sample.  Supplemental (2):  Additional immunohistochemical staining for progesterone receptor and HER2 are completed per clinician request  with following results:  Progesterone receptor: Negative; 0% nuclear tumor cell staining.  HER2: Negative; stain score = 0.  Supplemental (3):  [place] DIAGNOSIS:  FINAL DIAGNOSIS  Breast, right, needle core biopsy ([id number]; 06/17/2014): Invasive ductal carcinoma, [place] grade III (of  III), with focal micropapillary features.  Immunohistochemical stains performed at the referring institution show that the tumor cells have the following  phenotype: - Estrogen receptor: Negative (0% tumor cells staining). - Progesterone receptor: Negative (0% tumor  cells staining). - HER2: Negative (score 0).  Lymph nodes, right, sentinel biopsy and lumpectomy ([id number]; 07/11/2014):  1. Right sentinel lymph node: A single (1) lymph node is negative for metastatic carcinoma.  Immunohistochemical stains performed by the referring institution and reviewed at [facility] for cytokeratin are  negative, confirming the diagnosis.  2. Right breast: Invasive ductal carcinoma with  micropapillary features, per report 11.5 mm in greatest dimension.  Foci suspicious for lymphovascular invasion identified. Margins of resection are free of tumor.  Immunohistochemical stains performed by the referring institution and reviewed at [facility] show that the tumor  cells are negative for p63 and show patchy positivity for CK 5/6.  Lung, right, needle core biopsy ([id number]; 02/14/2018): Squamous cell carcinoma.    Immunohistochemical stains performed by the referring institution show the tumor cells are strongly positive for p63  and CK 5/6, while negative for TTF-1 and CK7. These result support the diagnosis. Axilla, right, needle core biopsy  ([id number]; 03/11/2019): Lymph node positive for metastatic carcinoma, most consistent with breast primary  (see comment).  Immunohistochemical stains performed by the referring institution and reviewed at [facility] show that the tumor  cells are negative for p63, focally positive for CK 5/6, focally and weakly positive for JUAREZ-3, and strongly positive  for CK7. They are also negative for estrogen receptor, progesterone receptor, and HER2 JAM (score 0).  COMMENT:  Thank you for allowing me to review the case of this 73-year-old lady who recently underwent needle core biopsies  of a right axillary mass and who has a previous diagnosis of an invasive ductal carcinoma of the right breast, as well  as a squamous cell carcinoma of the lung. I am reviewing this case because of my special interest in breast  pathology.  I agree with your interpretation of this case. In my opinion, the lung tumor corresponds to a squamous cell  carcinoma and appears to be unrelated to the invasive ductal carcinoma of the breast. The axillary mass, in my  opinion, corresponds to metastases of the breast primary. Although it is a poorly differentiated carcinoma, the  immunophenotype is most consistent with the one that the breast primary exhibited, and is inconsistent with  a  metastatic squamous cell carcinoma. I did share the case with Dr. Anabel Stone, one of our pulmonary pathologists,  and she concurs with this interpretation.  Note: Report attached.  Performing location:  [address] [address]      LYMPH NODE, RIGHT AXILLA, BIOPSY: 6/16/21   - Metastatic poorly-differentiated carcinoma, consistent with breast primary  -ER, ND, and HER2 immunohistochemical hormonal markers are also negative  PD-L1 (22C3) SemiQuant IHC, Manual  Interpretation  Right axillary lymph node , specimen for PD-L1 immunohistochemistry studies (clone 22C3, [employer], [place], [place]; using a proprietary detection system) ([id number]):  Reported carcinoma site of origin: Breast  Result: The percent of PD-L1 positive cells based upon the total number of tumor cells (combined positive  score, CPS) is greater than or equal to 10.  Interpretation: Studies suggest that positive PD-L1 immunohistochemistry in tumor cells and/or tumorassociated  immune cells may predict tumor response to therapy with immune checkpoint inhibitors. This  result should not be used as the sole factor in determining treatment, as other factors (for example, tumor  mutation burden, and microsatellite instability) have been also studied as predictive markers.          CT neck/chest/abd/pelvis w/contrast 4/26/2019   The previously described right hilar mass is no longer visible.  There is persistent volume loss in the right middle lobe this appears similar to the prior PET-CT February 21, 2019.    Persistent abnormal right axillary node today measuring about 15 mm compared 18 mm prior.  The positioning of the adjacent nodes is slightly different likely accounting for the slight difference in measurement.    Cluster of tree-in-bud nodular densities left lower lobe series 2, image 93.  This may be related to small airways disease though serial follow-up  suggested.      PET/CT 6/20/2019   New and worsening hypermetabolic lymph nodes concerning for metastatic breast cancer as described above.  Tissue sampling would be required for definitive diagnosis.      2-D echo 6/27/2019   Normal left ventricular systolic function. The estimated ejection fraction is 55%  Concentric left ventricular remodeling.  Normal LV diastolic function.  Normal right ventricular systolic function.  Moderate left atrial enlargement.  Mild right atrial enlargement.  Mild aortic regurgitation.  Mild mitral regurgitation.  Mild tricuspid regurgitation.  Normal central venous pressure (3 mm Hg).  The estimated PA systolic pressure is 25 mm Hg      PET/CT 9/19/2019   Favorable response to therapy with interval decrease in size and uptake of several right axillary lymph nodes and a supraclavicular lymph node.  Mild residual activity may indicate viable tumor.    No new hypermetabolic findings worrisome for malignancy.    PET/CT 2/28/2020  1.  No evidence of hypermetabolic tumor.  Continued improvement of the right axilla status post adjuvant radiation.    2.  No new hypermetabolic lesions      CTA 6/17/2020  1. No evidence of pulmonary embolus. No aortic dissection.   2. There is no evidence for new or progressive disease in the chest as compared to PET/CT 6/20/2019 in this patient with history of right breast cancer and right lung neoplasm. The patient does have a more recent PET/CT 2/28/2020 from an outside facility per the electronic medical record although images are not available for direct comparison. Correlation with these images may be beneficial. Continued long-term surveillance recommended.  3. Stable appearance of the treated tumor in the right hilum/middle lobe.  4. Stable treated right breast cancer and axillary adenopathy without evidence for developing breast mass or new right axillary adenopathy.  5. Stable tiny nodularity/tree-in-bud densities in the left lung, probably  postinfectious or inflammatory.  6. Wall thickening of the esophagus may be correlated for esophagitis. Possible tiny hiatus hernia.  7. Slight bronchial wall thickening may be correlated for bronchitis.  8. Mild to moderate emphysema as before.  9. Reflux of contrast into the IVC and hepatic veins is nonspecific but may be correlated for elevated right heart pressures.  10. Coronary calcifications and/or stents similar to prior.    Echo 5/21/2020  Technically difficult study.   Normal left ventricular systolic function.   Left ventricular ejection fraction is estimated at 55%.   Severe mitral annular calcification.   Mild mitral valve regurgitation.   Aortic valve sclerosis without stenosis or regurgitation.     PFTs 6/17/2020  Spirometry reveals a very severe obstructive lung defect, which does not significantly improve post bronchodilators. Lung volumes revealed air trapping. Diffusing capacity was moderately impaired.        Del 6/26/2020  BI-RADS Category:   Overall: 2 - Benign      PET/CT 10/23/2020   Impression:     Stable mildly hypermetabolic right axillary lymph node/nodular density contralateral to the site of injection.  Differential considerations include metastatic lymph node, reactive lymph node from benign right upper extremity process, and fat necrosis.  Recommend physical examination of the upper extremity and consideration of ultrasound for further evaluation of the node/nodule and possible image guided biopsy.  Otherwise, attention on follow-up.     Otherwise, no other hypermetabolic disease identified      Mammo diagnostic right /US 11/4/2020   Impression:   1. No suspicious finding either on mammogram or ultrasound at the site of the palpable lump in the right breast at 10:00 o'clock. A bilateral mammogram is recommended in June 2020 to return to the patient to annual screening.   2. Redemonstration of the morphologically abnormal lymph node in the right axilla that contains a biopsy clip,  compatible with the known recurrence metastatic breast cancer that has been treated with chemotherapy. The appearance of this lymph node is unchanged from 06/26/2020 and overall appears smaller when compared to 03/11/2019.     Abd US 12/11/2020   Impression:     1. Echogenic liver likely representing hepatic steatosis.  2. Right upper quadrant ultrasound otherwise is unremarkable.     PET/CT 10/14/21     Impression:     1.  No definite evidence of hypermetabolic tumor.  Interval resolution of hypermetabolic right axillary lymph nodes.  No new hypermetabolic findings.     2.  Persistent low-grade diffuse cutaneous uptake which is nonspecific.    MRI shoulder w w/out contrast 1/26/22   Impression:     Mild edema and postcontrast enhancement at the myotendinous junction of the supraspinatus and infraspinatus, it is nonspecific and could be due to degenerative change or tendinosis.     Small area of interstitial tear at the footprint insertion of the infraspinatus tendon.     No large rotator cuff tear.     No advanced degenerative change.     No osseous lesions.     PET/CT 1/26/22  Impression:In this patient with breast cancer, there is no evidence of hypermetabolic tumor to suggest recurrent or metastatic disease.   Less extensive but, nonspecific, low-grade diffuse cutaneous uptake of the right breast.       PET/CT 4/27/22  Impression:     1.  No FDG avid disease to suggest recurrence or metastatic disease.     Unchanged right breast skin thickening with mild FDG uptake.       PET/CT 7/13/22   Impression:     In this patient with lung and breast cancer, there is new left lower lobe opacification with mild radiotracer uptake which may represent aspiration, pneumonia, or malignancy.  Tissue sampling would be required for definitive diagnosis.     Unchanged right breast skin thickening with mild radiotracer uptake.    CT chest w/contrast 8/25/22    Decreasing patchy pulmonary consolidation within the left lower lobe  when compared to prior PET-CT scan dated 07/13/2022.  The overall appearance of the patchy consolidation as well as the moderate improvement when compared to the prior study suggest a benign etiology such as left lower lobe pneumonia.     Persistent band of atelectasis/scarring within the right middle lobe, stable dating back to 04/26/2019.     Coronary artery atherosclerosis in a multi vessel distribution.     There are no measurable lesions per RECIST criteria.    PET /CT  11/21/22 shows New right lower lobe infiltrate worrisome for pneumonia or aspiration.Chronic infiltrate right middle lobe.   Resolution of the left lower lobe infiltrate.    Mammo 7/26/22  BI-RADS Category:   Overall: 2 - Benign        CT chest with contrast 11/21/22    Impression:     New right lower lobe infiltrate worrisome for pneumonia or aspiration.     Chronic infiltrate right middle lobe.     Resolution of the left lower lobe infiltrate.     Coronary artery calcifications.         PET/CT 1/11/23  Impression:     1. In this patient with lung and breast cancer, there is stable right breast skin thickening with mild radiotracer uptake.  2. Interval resolution of hypermetabolic opacification in the left lower lobe as compared to FDG PET-CT 07/14/2022.  Interval improvement in patchy opacities in the right lower lobe as compared to CT 11/21/2022    .                Electronically Signed By: Tapan Young MD 4/16/2023 23:17 CDT, [facility]  Narrative    PET/CT 4/14/23  [facility]  HISTORY:       Malignant neoplasm of female breast, unspecified estrogen receptor status, unspecified laterality, unspecified site of breast (CMS/HCC).       ICD10:  C50.919   Malignant neoplasm of female breast, unspecified estrogen receptor status, unspecified laterality, unspecified site of breast (CMS/HCC)       REFERENCE EXAMS:     7/13/2022 PET CT ([facility]) (no images, report only)     6/20/2019 PET CT ([facility])       TECHNIQUE:      PET/CT with attenuation correction.     Dose:  13.62 mCi of FDG-18     Injection site:  left wrist     Field of view:  Skull base to thighs.     Arm position:  above head     Baseline glucose:  128 mg/dL       FINDINGS:       All SUV values are max SUV.     Head/Neck:     Physiologic uptake of radiotracer.         Thorax:     Right portacath.       Cutaneous thickening in the right breast (SUV=1.58).       Calcification of the mitral valve, similar to 6/20/2019.       Coronary artery atherosclerotic calcification.     Atelectasis/scarring along the inferior aspect of the right major fissure, similar to 6/20/2019.         Abdomen/Pelvis:     Physiologic uptake in the genitourinary system.     Physiologic uptake in the intestines.       Patchy atherosclerotic calcification in the aorta and iliac arteries.       Impression      No opacity in the left lower lobe on the current exam to correspond to a left lower lobe opacity described on the 7/13/2022 PET CT report.  Images from the 7/13/2022 PET CT are not available at the time of this report.       Cutaneous thickening in the right breast with mild uptake.  There was a similar finding described on the 71/3/2022 PET CT report.           Electronically Signed By: Tapan Young MD 4/16/2023 23:17 CDT, [facility]          Mammo screening bilateral 8/7/23  BI-RADS Category: Overall: 2 - Benign        PET/CT 11/14/23    Impression:     Similar appearing right breast skin thickening with mild hypermetabolic activity.  No new focal abnormal tracer uptake elsewhere.     Assessment:       1. Recurrent breast cancer, unspecified laterality    2. History of lung cancer    3. Chronic obstructive pulmonary disease, unspecified COPD type    4. Cough, unspecified type              Plan:     1.,2.   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  S/p adjuvant RT  completed 9/2014 Pt declined adjuvant chemo  Pt  hospitalized 11/2017 with acute-on-chronic  resp failure  Abnormal CT imaging revealing Large right hilar mass with involvement of  adjacent segmental pulmonary arterial branches and bronchi with associated postobstructive atelectasis  and involvement of mediastinal and axillary adenopathy   S/p rt axillary LN bx ( outside facility) - metastatic poorly differentiated carcinoma of unknown primary  site  status post  lung biopsy 1/31/2017 -benign lung tissue  PET/CT 1/26/2018   Intense FDG uptake within the known right infrahilar lung mass, compatible with malignancy.   Intensely hypermetabolic right axillary, retropectoral, and lower right cervical lymph nodes, compatible with metastases.  Abnormal FDG uptake along right breast skin thickening, new from prior chest CTA and mammogram.  PET/CT 11/14/23 Similar appearing right breast skin thickening with mild hypermetabolic activity.  No new focal abnormal tracer uptake elsewhere.  Repeat lung bx 2/14/2018 revealed Squamous Cell CA lung PD-L1 10% EGFR NEGALK NEG  Pt treated for advanced squamous cell CA of lung She s/p  cycle 6 of carboplatin/Abraxane Day1 completed 7/2/2018 ( day 15 held due to prolonged cytopenias)  She completed therapy with near CR     PET/CT 2/21/2019 showed increased size and irregularity of the right axillary lymph node with increased FDG avidity     MAMMO/US rt breast 3/2019  reveals suspicious findings rt axilla LN.  Biopsy of the lymph node measuring 1.4 x 1.1 x 1.3     Pt s/p  rt axillary LN bxPositive for poorly differentiated carcinoma.- likely breast primary ERneg PRneg Her 2 neg    Outside review of specimen(s) revealed  lung tumor corresponds to a squamous cell carcinoma and appears to be unrelated to the invasive ductal carcinoma of the breast. It was determined The axillary mass, in my opinion, corresponded  to metastases of the breast primary. Although it is a poorly differentiated  carcinoma, theimmunophenotype is most consistent with the one that the breast primary exhibited, and is inconsistent with a metastatic squamous cell carcinoma.     CT imaging 4/26/2019 persistent rt axillary LAD, no other sites of disease     Lymph node biopsy 3/11/2019 Right axilla, mass, core biopsy:Positive for poorly differentiated carcinoma.favor breast primary     PET/CT 6/20/2019  New and worsening hypermetabolic lymph nodes concerning for metastatic breast cancer     Previously Discussed  imaging findings in detail with patient which reveals new and worsening hypermetabolic melena metabolic lymph nodes including hypermetabolic supraclavicular node.  Therefore, at treatment option will be systemic chemotherapy in light of the recent imaging findings.  She will be followed by her surgical oncologist Dr. Christopher      IT was determined to proceed with systemic chemotherapy   S/p  AC [id number] x 3 wks x 4 wks completed 9/6/2019   ( pt has not received in past)      PET/CT 9/19/2019 shows disease response with interval decrease in size and uptake of several right axillary lymph nodes and a supraclavicular lymph node.  Mild residual activity may indicate viable tumor.No new hypermetabolic findings worrisome for malignancy.    Pt followed by  Breast Surgery and plan was  for possible   ALND. Pt with Recurrent cancer in her right axilla and right supraclavicular fossa.   She completed chemotherapy 9/6/2019 with near CR  It was determined  Radiation will be given to the original areas of hypermetabolic activity in the right axilla and right supraclavicular region    Pt completed  RT 12/16/2019     PET/CT 1/14/21 shows   New right axillary hypermetabolic lymph nodes with preserved normal architecture suggestive of reactive nodes.  Stable appearing previously identified hypermetabolic lymph node with mild increase in surrounding fat stranding.        Findings previously d/w with treating breast surgeon and it was  determined to cont to monitor and close f/u as pt is heavily pre treated, has received RT to site   Pt acknowledged understanding and agreeable with plan     PET/CT imaging  5/20/21 shows Continued increase in both size and hypermetabolic activity of right axillary level 1 lymph nodes a new, mildly hypermetabolic cutaneous thickening of the right breast.     Right breast, skin, punch biopsy:Negative for carcinoma    LYMPH NODE, RIGHT AXILLA, BIOPSY: 6/16/21   - Metastatic poorly-differentiated carcinoma, consistent with breast primary triple neg  PD-L1 immunohistochemistry studies(combined positive score, CPS) is greater than or equal to 10   PET/CT showed  No definite evidence of hypermetabolic tumor.  Interval resolution of hypermetabolic right axillary lymph nodes.  No new hypermetabolic findings.      S/p C16 pembrolizumab 6/15/22   Last  PET/CT imaging shows  new left lower lobe opacification with mild radiotracer uptake which may represent aspiration, pneumonia, or malignancy.  Recent follow-up imaging studies decreasing patchy pulmonary consolidation within the left lower lobe when compared to prior PET-CT scan dated 07/13/2022.  Plan to remain off of therapy   Follow-up PET/CT imaging 1/11/23 Interval resolution of hypermetabolic opacification in the left lower lobe as compared to FDG PET-CT 07/14/2022.  Interval improvement in patchy opacities in the right lower lobe as compared to CT 11/21/2022   follow up PET imaging [id number] -  performed at [facility] .([employer] mobile was not available at  since broken )No opacity in the left lower lobe on the current exam to correspond to a left lower lobe opacity described on the 7/13/2022 PET CT report.  Images from the 7/13/2022 PET CT are not available at the time of this report.       Plan follow up PET imaging  3. F/b Pulmonology  Pt on supp 02 at night   Cont MDI and O2 as prescribed       4. .  CXR today      Addendum: CXR neg. Discussed with Dr. Katz and recommended  pt start on Prednisone 40 mg po with taper. Rx sent to preferred pharmacy.     Cbc,cmp, Fe studies prior to f/u 2 mos     Advance Care Planning     Power of   I previously  initiated the process of advance care planning today and explained the importance of this process to the patient.  I introduced the concept of advance directives to the patient, as well. Then the patient received detailed information about the importance of designating a Health Care Power of  (HCPOA). She was also instructed to communicate with this person about their wishes for future healthcare, should she become sick and lose decision-making capacity. The patient has previously appointed a HCPOA. After our discussion, the patient has decided to complete a HCPOA and has appointed her son and niece and  I spent a total time of 16 minutes discussing this issue with the patient.         Cc: Swetha Katz M.D.         MD Ranjan Roberson MD

## 2023-12-12 NOTE — PLAN OF CARE
Patient arrived to unit for Labs from Port.Pt continues with cough. Pt will see Dr. Holliday today. Plan of care reviewed, patient agreeable to plan.Labs drawn from port without issue. + blood return noted, flushed well and Heparin to dwell. VSS. Discharge instructions reviewed, patient instructed to return 2/19. Patient ambulated off unit unassisted by self. Patient in NAD at time of discharge.

## 2023-12-14 ENCOUNTER — TELEPHONE (OUTPATIENT)
Dept: HEMATOLOGY/ONCOLOGY | Facility: CLINIC | Age: 78
End: 2023-12-14
Payer: MEDICARE

## 2023-12-14 RX ORDER — BENZONATATE 100 MG/1
100 CAPSULE ORAL EVERY 6 HOURS PRN
Qty: 30 CAPSULE | Refills: 1 | Status: SHIPPED | OUTPATIENT
Start: 2023-12-14 | End: 2024-02-08

## 2023-12-14 NOTE — TELEPHONE ENCOUNTER
Patient called in regards to the results from her chest x-ray she had done on 12/12, she states that she still has the cough and that it is getting worse.

## 2023-12-14 NOTE — TELEPHONE ENCOUNTER
TC to pt  to advise her that her Chest X ray  looks good and Dr acuña sent her a refill to her pharmacy for tessalon pearls   She acknowledged understanding

## 2023-12-21 ENCOUNTER — TELEPHONE (OUTPATIENT)
Dept: FAMILY MEDICINE | Facility: CLINIC | Age: 78
End: 2023-12-21
Payer: MEDICARE

## 2023-12-21 ENCOUNTER — TELEPHONE (OUTPATIENT)
Dept: HEMATOLOGY/ONCOLOGY | Facility: CLINIC | Age: 78
End: 2023-12-21
Payer: MEDICARE

## 2023-12-21 DIAGNOSIS — C50.919 RECURRENT BREAST CANCER, UNSPECIFIED LATERALITY: Primary | ICD-10-CM

## 2023-12-21 RX ORDER — PREDNISONE 10 MG/1
TABLET ORAL
Qty: 30 TABLET | Refills: 0 | Status: SHIPPED | OUTPATIENT
Start: 2023-12-21

## 2023-12-21 NOTE — TELEPHONE ENCOUNTER
Called patient verified name and . Patient was instructed to go to nearest acute care facility or an ER department for further evaluation.

## 2023-12-21 NOTE — TELEPHONE ENCOUNTER
Patient c/o continued cough, states that her cough is wet, nothing comes up with the cough, no other symptoms. She is taking the Tessalon Perles with no relief, would like to know if there is anything else that she can take that will help relieve the cough.

## 2023-12-21 NOTE — TELEPHONE ENCOUNTER
----- Message from Ileana Stevie sent at 12/21/2023  7:56 AM CST -----  .Type: Patient Call Back    Who called: Self     What is the request in detail: stated her ears hurt and nasal drip would like to know can she get a prescription for antibiotics sent over to .  [facility] - [place], [place] - [address] [facility]  [address] [address] [address]  Phone: [phone] Fax: [phone]        Can the clinic reply by [facility]?No     Would the patient rather a call back or a response via [facility]? Call Back     Best call back number: .[phone] (home)       Additional Information:

## 2023-12-21 NOTE — PROGRESS NOTES
Patient continues with a persistent cough.  Patient was previous was prescribed Tessalon Perles without any relief.  Discussed with her treating pulmonologist Dr. Katz it was recommended that she begin a steroid taper.  Prescription was sent to patient's preferred pharmacy

## 2024-01-02 DIAGNOSIS — E78.5 HYPERLIPIDEMIA LDL GOAL <70: Chronic | ICD-10-CM

## 2024-01-02 RX ORDER — NITROGLYCERIN 0.4 MG/1
TABLET SUBLINGUAL
Qty: 25 TABLET | Refills: 11 | Status: SHIPPED | OUTPATIENT
Start: 2024-01-02

## 2024-01-02 RX ORDER — ROSUVASTATIN CALCIUM 10 MG/1
10 TABLET, COATED ORAL DAILY
Qty: 90 TABLET | Refills: 3 | Status: SHIPPED | OUTPATIENT
Start: 2024-01-02

## 2024-01-02 RX ORDER — MONTELUKAST SODIUM 10 MG/1
10 TABLET ORAL
Qty: 30 TABLET | Refills: 0 | Status: SHIPPED | OUTPATIENT
Start: 2024-01-02

## 2024-01-02 RX ORDER — METOPROLOL TARTRATE 25 MG/1
25 TABLET, FILM COATED ORAL 2 TIMES DAILY
Qty: 180 TABLET | Refills: 3 | Status: SHIPPED | OUTPATIENT
Start: 2024-01-02

## 2024-01-02 NOTE — TELEPHONE ENCOUNTER
No care due was identified.  [facility] Embedded Care Due Messages. Reference number: [id number].   1/02/2024 8:52:37 AM CST

## 2024-01-02 NOTE — TELEPHONE ENCOUNTER
----- Message from Abebe Lara sent at 1/2/2024 11:31 AM CST -----  Regarding: self  Type: Patient Call Back    Who called:self    What is the request in detail:needs medicaitons refills, stated she is out     Can the clinic reply by [facility]?no    Would the patient rather a call back or a response via [facility]? callback    Best call back number:[phone]    Additional Information:

## 2024-01-19 NOTE — PLAN OF CARE
Problem: Patient Care Overview  Goal: Plan of Care Review  Outcome: Ongoing (interventions implemented as appropriate)  Patient received Carboplatin and Abraxane. Tolerated well. VSS. Received discharge instructions and verbalized understanding.       
1 = Total assistance

## 2024-01-29 DIAGNOSIS — J30.9 ALLERGIC SINUSITIS: ICD-10-CM

## 2024-01-29 RX ORDER — LEVOCETIRIZINE DIHYDROCHLORIDE 5 MG/1
TABLET, FILM COATED ORAL
Qty: 90 TABLET | Refills: 2 | Status: SHIPPED | OUTPATIENT
Start: 2024-01-29

## 2024-01-29 NOTE — TELEPHONE ENCOUNTER
No care due was identified.  [facility] Embedded Care Due Messages. Reference number: [id number].   1/29/2024 11:32:36 AM CST

## 2024-01-30 NOTE — TELEPHONE ENCOUNTER
Refill Decision Note   Ade Castellano  is requesting a refill authorization.  Brief Assessment and Rationale for Refill:  Approve     Medication Therapy Plan:         Comments:     Note composed:7:52 PM 01/29/2024

## 2024-02-08 DIAGNOSIS — R05.9 COUGH, UNSPECIFIED TYPE: ICD-10-CM

## 2024-02-08 RX ORDER — BENZONATATE 100 MG/1
CAPSULE ORAL
Qty: 30 CAPSULE | Refills: 1 | Status: SHIPPED | OUTPATIENT
Start: 2024-02-08 | End: 2024-05-21

## 2024-02-19 ENCOUNTER — OFFICE VISIT (OUTPATIENT)
Dept: HEMATOLOGY/ONCOLOGY | Facility: CLINIC | Age: 79
End: 2024-02-19
Payer: MEDICARE

## 2024-02-19 ENCOUNTER — INFUSION (OUTPATIENT)
Dept: INFUSION THERAPY | Facility: HOSPITAL | Age: 79
End: 2024-02-19
Attending: INTERNAL MEDICINE
Payer: MEDICARE

## 2024-02-19 VITALS
DIASTOLIC BLOOD PRESSURE: 74 MMHG | HEART RATE: 57 BPM | OXYGEN SATURATION: 97 % | SYSTOLIC BLOOD PRESSURE: 128 MMHG | WEIGHT: 160.06 LBS | HEIGHT: 63 IN | BODY MASS INDEX: 28.36 KG/M2

## 2024-02-19 VITALS — SYSTOLIC BLOOD PRESSURE: 176 MMHG | HEART RATE: 55 BPM | DIASTOLIC BLOOD PRESSURE: 83 MMHG | OXYGEN SATURATION: 99 %

## 2024-02-19 DIAGNOSIS — C50.919 RECURRENT BREAST CANCER, UNSPECIFIED LATERALITY: Primary | ICD-10-CM

## 2024-02-19 DIAGNOSIS — E87.6 HYPOKALEMIA: ICD-10-CM

## 2024-02-19 DIAGNOSIS — D50.9 IRON DEFICIENCY ANEMIA, UNSPECIFIED IRON DEFICIENCY ANEMIA TYPE: Primary | ICD-10-CM

## 2024-02-19 DIAGNOSIS — Z85.118 HISTORY OF LUNG CANCER: ICD-10-CM

## 2024-02-19 DIAGNOSIS — C34.90 SQUAMOUS CELL CARCINOMA LUNG: ICD-10-CM

## 2024-02-19 DIAGNOSIS — J44.9 CHRONIC OBSTRUCTIVE PULMONARY DISEASE, UNSPECIFIED COPD TYPE: ICD-10-CM

## 2024-02-19 LAB
ALBUMIN SERPL BCP-MCNC: 3.8 G/DL (ref 3.5–5.2)
ALP SERPL-CCNC: 48 U/L (ref 55–135)
ALT SERPL W/O P-5'-P-CCNC: 14 U/L (ref 10–44)
ANION GAP SERPL CALC-SCNC: 12 MMOL/L (ref 8–16)
AST SERPL-CCNC: 16 U/L (ref 10–40)
BASOPHILS # BLD AUTO: 0.04 K/UL (ref 0–0.2)
BASOPHILS NFR BLD: 0.8 % (ref 0–1.9)
BILIRUB SERPL-MCNC: 0.5 MG/DL (ref 0.1–1)
BUN SERPL-MCNC: 12 MG/DL (ref 8–23)
CALCIUM SERPL-MCNC: 9 MG/DL (ref 8.7–10.5)
CHLORIDE SERPL-SCNC: 105 MMOL/L (ref 95–110)
CO2 SERPL-SCNC: 23 MMOL/L (ref 23–29)
CREAT SERPL-MCNC: 0.8 MG/DL (ref 0.5–1.4)
DIFFERENTIAL METHOD BLD: NORMAL
EOSINOPHIL # BLD AUTO: 0.1 K/UL (ref 0–0.5)
EOSINOPHIL NFR BLD: 2.9 % (ref 0–8)
ERYTHROCYTE [DISTWIDTH] IN BLOOD BY AUTOMATED COUNT: 13.3 % (ref 11.5–14.5)
EST. GFR  (NO RACE VARIABLE): >60 ML/MIN/1.73 M^2
GLUCOSE SERPL-MCNC: 114 MG/DL (ref 70–110)
HCT VFR BLD AUTO: 41.1 % (ref 37–48.5)
HGB BLD-MCNC: 13.6 G/DL (ref 12–16)
IMM GRANULOCYTES # BLD AUTO: 0.01 K/UL (ref 0–0.04)
IMM GRANULOCYTES NFR BLD AUTO: 0.2 % (ref 0–0.5)
LYMPHOCYTES # BLD AUTO: 1.1 K/UL (ref 1–4.8)
LYMPHOCYTES NFR BLD: 23.4 % (ref 18–48)
MCH RBC QN AUTO: 30.7 PG (ref 27–31)
MCHC RBC AUTO-ENTMCNC: 33.1 G/DL (ref 32–36)
MCV RBC AUTO: 93 FL (ref 82–98)
MONOCYTES # BLD AUTO: 0.4 K/UL (ref 0.3–1)
MONOCYTES NFR BLD: 8.8 % (ref 4–15)
NEUTROPHILS # BLD AUTO: 3.1 K/UL (ref 1.8–7.7)
NEUTROPHILS NFR BLD: 63.9 % (ref 38–73)
NRBC BLD-RTO: 0 /100 WBC
PLATELET # BLD AUTO: 214 K/UL (ref 150–450)
PMV BLD AUTO: 9.6 FL (ref 9.2–12.9)
POTASSIUM SERPL-SCNC: 3.4 MMOL/L (ref 3.5–5.1)
PROT SERPL-MCNC: 6.5 G/DL (ref 6–8.4)
RBC # BLD AUTO: 4.43 M/UL (ref 4–5.4)
SODIUM SERPL-SCNC: 140 MMOL/L (ref 136–145)
WBC # BLD AUTO: 4.88 K/UL (ref 3.9–12.7)

## 2024-02-19 PROCEDURE — 85025 COMPLETE CBC W/AUTO DIFF WBC: CPT | Mod: HCNC | Performed by: INTERNAL MEDICINE

## 2024-02-19 PROCEDURE — 3074F SYST BP LT 130 MM HG: CPT | Mod: HCNC,CPTII,S$GLB, | Performed by: INTERNAL MEDICINE

## 2024-02-19 PROCEDURE — 63600175 PHARM REV CODE 636 W HCPCS: Mod: HCNC | Performed by: INTERNAL MEDICINE

## 2024-02-19 PROCEDURE — 99999 PR PBB SHADOW E&M-EST. PATIENT-LVL IV: CPT | Mod: PBBFAC,HCNC,, | Performed by: INTERNAL MEDICINE

## 2024-02-19 PROCEDURE — 3078F DIAST BP <80 MM HG: CPT | Mod: HCNC,CPTII,S$GLB, | Performed by: INTERNAL MEDICINE

## 2024-02-19 PROCEDURE — 80053 COMPREHEN METABOLIC PANEL: CPT | Mod: HCNC | Performed by: INTERNAL MEDICINE

## 2024-02-19 PROCEDURE — 1157F ADVNC CARE PLAN IN RCRD: CPT | Mod: HCNC,CPTII,S$GLB, | Performed by: INTERNAL MEDICINE

## 2024-02-19 PROCEDURE — 1101F PT FALLS ASSESS-DOCD LE1/YR: CPT | Mod: HCNC,CPTII,S$GLB, | Performed by: INTERNAL MEDICINE

## 2024-02-19 PROCEDURE — 99214 OFFICE O/P EST MOD 30 MIN: CPT | Mod: HCNC,S$GLB,, | Performed by: INTERNAL MEDICINE

## 2024-02-19 PROCEDURE — 36591 DRAW BLOOD OFF VENOUS DEVICE: CPT | Mod: HCNC

## 2024-02-19 PROCEDURE — 1159F MED LIST DOCD IN RCRD: CPT | Mod: HCNC,CPTII,S$GLB, | Performed by: INTERNAL MEDICINE

## 2024-02-19 PROCEDURE — 1125F AMNT PAIN NOTED PAIN PRSNT: CPT | Mod: HCNC,CPTII,S$GLB, | Performed by: INTERNAL MEDICINE

## 2024-02-19 PROCEDURE — 3288F FALL RISK ASSESSMENT DOCD: CPT | Mod: HCNC,CPTII,S$GLB, | Performed by: INTERNAL MEDICINE

## 2024-02-19 RX ORDER — HEPARIN 100 UNIT/ML
500 SYRINGE INTRAVENOUS
Status: DISCONTINUED | OUTPATIENT
Start: 2024-02-19 | End: 2024-02-19 | Stop reason: HOSPADM

## 2024-02-19 RX ORDER — SODIUM CHLORIDE 0.9 % (FLUSH) 0.9 %
10 SYRINGE (ML) INJECTION
Status: DISCONTINUED | OUTPATIENT
Start: 2024-02-19 | End: 2024-02-19 | Stop reason: HOSPADM

## 2024-02-19 RX ADMIN — HEPARIN 500 UNITS: 100 SYRINGE at 08:02

## 2024-02-19 NOTE — PROGRESS NOTES
Subjective:       Patient ID: Ade Castellano is a 78 y.o. female.    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 The PET(+) disease in the right axilla and right supraclavicular fossa will be boosted at 200 cGy per fraction to 6000 cGy total dose.  S/p LYMPH NODE, RIGHT AXILLA, BIOPSY: 6/16/21 . Metastatic poorly-differentiated carcinoma, c/w breast primary ERnegPRneg Her2 neg  PD-L1 (22C3) IHC CPS> is greater than or equal to 10  Pembrolizumab 7/22/21 -6/15/22         Prior Hx:  77 y/o female with history of  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  And Rt  breast Haja/p  cycle 6 of carboplatin/Abraxane 7/2/2018   She has  History of Stage 1A  Rt breast cancer Grade 3, ER/NE neg , Her 2 jose maria neg s/p lumpectomy 7/11/2014 and s/p adjuvant RT 9/2014 Pt declined Adjuvant chemo.Pathology showed a 1.15 cm, grade 3 infiltrating ductal carcinoma.  One sentinel lymph node was negative for malignancy.  Margins were negative and the closest margin was 1 cm.  She was staged  pT1c  pN0 cM0 stage IA.  She declined adjuvant chemo therapy.  She completed post operative radiation therapy at 400 cGy per fraction to 4000 cGy 9/2014  Pt hospitalized 11/2017 for  acute on chronic respiratory failure requiring intubation and ventilation. Has large lung mass in right lung with probable post obstructive pneumonia resulting in COPD  exacerbation.CTA chest 11/29/2017 revealed   Large right hilar mass with involvement of adjacent segmental pulmonary arterial branches and bronchi with associated postobstructive atelectasis and volume loss in the RML  with adjacent ground glass opacity concerning for underlying neoplastic process. Mediastinal and axillary adenopathy, with a right axillary lymph node measuring up to 2.0 cm in short axis diameter.She underwent rt axillary LN bx at outside facility- on 1/16/2018 at [facility]. Pathology revealed metastatic poorly differentiated carcinoma of unknown primary site. She next underwent lung bx at [facility] 1/31/2018  benign lung tissue. Repeat Right Lung Bx 2/14/2018 Pathology reveals Squamous cell carcinoma PD-L1 10% low expression EGFR NEG ALK NEG BRAF NEG. Outside slides axillary LN specimen were reviewed/comparison to lung bx findings . She completed    cycle 6 of carboplatin/Abraxane completed 7/2018 .PET/CT  4/19/2018 revealed there has been a excellent response to therapy.  At least 90% reduction in malignant activity.PET/CT 2/21/2019 increased size and irregularity of the right axillary lymph node with increased FDG avidityMAMMO/US rt breast 2/2019 revealed suspicious findings rt axilla LN.  Right axilla, mass, core biopsy: Positive for poorly differentiated carcinoma breast primary ER neg/NC neg Her2 neg.[facility] for [facility] reviewIt was determined  the lung tumor corresponds to a squamous cellcarcinoma and appears to be unrelated to the invasive ductal carcinoma of the breast. The axillary mass, in myopinion, corresponds to metastases of the breast primary. Although it is a poorly differentiated carcinoma, theimmunophenotype is most consistent with the one that the breast primary exhibited, and is inconsistent with metastatic squamous cell carcinoma. She was treated with  AC ( adriamycin 60m/m2/Cytoxan 600mg/m2)  q21d x 4 cycles completed 9/6/2019 . PET/CT 9/19/2019 shows disease response  l decrease in size and uptake of several right axillary lymph nodes and a supraclavicular lymph node.  Mild residual activity may indicate viable tumor.Pt followed by Rad/Onc. She was presented at [facility] tumor board and it was determined to proceed Radiation therapy only. No ALND due to concurrent lung and supraclavicular node. She  completed  RT 12/16/2019  To right axilla and right supraclavicular region PET/CT imaging  5/20/21 shows Continued increase in both size and hypermetabolic activity of right axillary level 1 lymph nodes a new, mildly hypermetabolic cutaneous thickening of the right breast. she underwent LYMPH NODE, RIGHT AXILLA, BIOPSY: 6/16/21 . Pathology showed Metastatic poorly-differentiated carcinoma, consistent with breast primary-ERneg/ PRneg  HER2  neg  Pt started on pembrolizumab 7/2021  Pt hospitalized 4/6/22 with hypokalemia and orthostatic hypotension  S/p C16 pembrolizumab 6/15/22  ( 400mg q6wks)      Interval    She reports   cough improved with treatment  Treated last visit for COPD exacerbation  She was prescribed course of steroids and antitussives  She continues with mild LOGAN-stable  She is f/b pulmonology, Dr. Katz  She reports arthralglas involving rt shoulder past few weeks  She also has LBP past few weeks  No numbness/tingling in extremities  Dysphagia improved s/p  dilation   No fevers  Appetite and weight stable  She has supp 02 at home       PET /CT ( NOT DOTATATE) 4/14/23 No opacity in the left lower lobe on the current exam to correspond to a left lower lobe opacity described on the 7/13/2022 PET CT report.  Images from the 7/13/2022 PET CT are not available at the time of this report.   Cutaneous thickening in the right breast with mild uptake.  There was a similar finding described on the 71/3/2022 PET CT report.                PrevHx:She had an abnormal mammogram 6/4/2014 whichRevealed a round solid mass 6mm in rt breast.She then underwent U/S guided core bx of rt  "breast mass on 6/17/2014.Pathology revealed infiltrating ductal carcinoma Grade 3 with tumorPresent in thin-walled spaces suggestive of lymphatic spaces. HormoneReceptor status on tumor specimen revealed ER negative 0% ,ID negative 0% and Her 2 jose maria negative.She subsequently underwent rt segmental mastectomy and SLN bxOn 7/11/2014. Pathology of rt breast lumpectomy revealed invasiveDuctal carcinoma with micropapillary pattern ( Invasive micropapillaryCa) with max tumor dimension 11.5mm with suggestion of tumor in Thin walled spaces c/w lymphovascular involvement. SurgicalMargins free of tumor, grade 3, ER/ID neg , Her 2 jose maria neg With [facility] Lymph node negative for Neoplasm. Pathologic staging [id number](i-)Her mother was diagnosed with breast cancer in her 50's/        Review of Systems   Constitutional: Positive for mild  fatigue. No  activity change,  fever.   HENT: Negative for mouth sores, rhinorrhea,dysphagia   Eyes: Negative for visual disturbance.   Respiratory: Positive for chronic  LOGAN,stable, Cough improved. Negative for wheezing.    Cardiovascular: Negative for chest pain.   Gastrointestinal:  Negative for abdominal pain and diarrhea.   Genitourinary: Negative for frequency.   Musculoskeletal: Positive for LBP  Skin: Negative for rash.   Neurological: Negative for dizziness and headaches.   Hematological: Negative for adenopathy.   Psychiatric/Behavioral: The patient is not nervous/anxious.        Objective:        Vitals:    02/19/24 0950   BP: 128/74   Pulse: (!) 57   SpO2: 97%   Weight: 72.6 kg (160 lb 0.9 oz)   Height: 5' 3" (1.6 m)             Physical Exam   Constitutional: She is oriented to person, place, and time. She appears ill, coughing episodes  HENT:   Head: Normocephalic.   Eyes: Conjunctivae and lids are normal.No scleral icterus.   Neck: Normal range of motion. Neck supple. No thyromegaly present.   Cardiovascular: Normal rate, regular rhythm and normal heart sounds.   murmur " heard.  Pulmonary/Chest: Breath sounds normal. She has no wheezes. She has no rales.   Abdominal: Soft. Bowel sounds are normal.  There is no tenderness. There is no rebound and no guarding.   Left breast- no masses or axillary LAD noted  Right breast- breast thickening inner quad    She has no cervical adenopathy.     She has rt axillary adenopathy.        Right: No supraclavicular adenopathy present.        Left: No supraclavicular adenopathy present.   Neurological: She is alert and oriented to person, place, and time. No cranial nerve deficit. Coordination normal.   Skin: Skin is warm and dry. No ecchymosis, no petechiae and no rash noted. No erythema.   Psychiatric: She has a normal mood and affect.             CT a/p w/contrast 11/29/2018   1. No acute abnormality identified within the abdomen and pelvis.    2.  There are a few nonspecific prominent lymph nodes in the upper abdomen including a periesophageal lymph node which measures 1.0 cm in short axis diameter.    3.  Significant abdominal aortic atherosclerosis and abdominal aorta ectasia.    4.  Additional findings as above.    PET/CT 1/26/2018   1.  Intense FDG uptake within the known right infrahilar lung mass, compatible with malignancy.  It is unclear if this represents primary lung malignancy or metastatic breast cancer.    2.  Intensely hypermetabolic right axillary, retropectoral, and lower right cervical lymph nodes, compatible with metastases.    3.  Abnormal FDG uptake along right breast skin thickening, new from prior chest CTA and mammogram.  This may relate to localized edema/inflammation, though correlation with physical exam and mammography are recommended to exclude underlying inflammatory carcinoma.      MRI Brain w w/out contrast 2/9/2018  1.  No evidence of intracranial metastases.  2.  Sinus disease       Rt axillary LN bx at outside facility- on 1/16/2018 at [facility]  Pathology revealed metastatic poorly differentiated  carcinoma of unknown primary  site 1/16/2018 at [facility]  TTF-1 negative, napsin negative  , cytokeratin 7 positive, cytokeratin 20 negative, p63 negative, cytokeratin 5/6 focal dim positivity    LUNG BIOPSY 1/31/2018  Pathology revealed benign lung tissue         SPECIMEN  1) Right Lung Bx 2/14/2018  Supplemental Diagnosis  Immunohistochemical stains show strong nuclear staining for p63 in essentially all tumor cells and very strong  cytoplasmic and membrane staining for CK5/6, also in essentially all tumor cells. TTF-1 and CK7 are negative  within tumor cells but do stain native pulmonary elements present within the biopsy. A stain for mucicarmine is  negative. Positive and negative controls function appropriately.  Final diagnosis: Specimen submitted as right lung biopsy  -Squamous cell carcinoma  (Electronically Signed: 2018-02-20 09:12:07 )  Diagnosed by: Phong Thompson  FINAL PATHOLOGIC DIAGNOSIS  Fragments of pulmonary parenchyma (submitted as right lung biopsy):    FINAL PATHOLOGIC DIAGNOSIS 1. Lymph node, right axilla, biopsy, review of 10 outside slides [facility], JS [phone],  collected January 11, 2018: Metastatic poorly differentiated carcinoma (see comment).  The histologic section shows fibrous stroma infiltrated by nest of poorly differentiated malignant cells including  occasional dyskeratotic cells morphologically suggestive of metastatic squamous cell carcinoma.  Immunohistochemical stains are nonspecific; the cells are positive for cytokeratin 7 and cytokeratin 5/6 (focal) and  negative for cytokeratin 20, TTF-1, p63, GCDFP, mammoglobin, and CEA        PET/CT 2/21/2019  Increased size and irregularity of the right axillary lymph node with increased FDG avidity.  Although this would be atypical in location for lung carcinoma, the increased size and avidity must be concerning for metastatic disease in this patient with history of squamous cell lung cancer.     US  Rt breast and mammo 2/26/2019  Impression:  Right  Lymph Node: Right axilla lymph node. Assessment: 4 - Suspicious finding. Biopsy is recommended.      BI-RADS Category:   Right: 4 - Suspicious  Overall: 4 - Suspicious     Recommendation:  Biopsy is recommended. Biopsy of the lymph node measuring 1.4 x 1.1 x 1.3 recommended , the one with the round shape configuration, corresponding to the most recent PET CT finding.         Pathology 3/11/2019   FINAL PATHOLOGIC DIAGNOSIS  Right axilla, mass, core biopsy:  Positive for poorly differentiated carcinoma.  See comment.  Comment:  The biopsy from the right axilla mass shows a poorly differentiated carcinoma in background lymphoid tissue  with enlarged cells showing increased nuclear size, prominent nucleoli, moderate amounts of cytoplasm and mitotic  figures. Immunostains are completed and reveal the tumor cells to stain positively with cytokeratin AE 1/AE3, CK  5/6 and CK 7. The tumor cells are negative for CK 20, Estrogen receptor, p63 and TTF-1. All stains have  satisfactory positive and negative controls. The patient's prior cases will be reviewed and an additional stain for  JUAREZ-3 is pending in an attempt to pinpoint the primary site of this malignancy and results will follow in a  supplemental report.      Supplemental Diagnosis  3/11/2019  The current axillary mass is compared to the patient's prior right lung biopsy (case number [id number]) and the  current axillary mass is morphologically similar to the lung malignancy. Also, the current axillary mass is compared  to the patient's prior breast resection (Case number [id number]) and appears similar as well. P63 is negative in the  [id number] tumor and CK 5/6 shows scattered positive staining in the [id number] tumor.  Immunostain for JUAREZ-3 is completed and shows only rare weak to moderate staining within the tumor cell nuclei  with satisfactory positive and negative controls. This stain is positive in the  surrounding lymphocytes. This staining  pattern is non-specific and does not definitively differentiate between a lung and breast primary malignancy in this  right axilla mass biopsy. The staining profile of the current axillary mass match more closely with the previous  breast carcinoma (due to the p63 negativity in both the 2014 breast cancer and the current axillary mass lesion but  p63 positivity in the lung mass from 2018).  This staining profile, together with the comparison with the patient's prior breast tumor and prior lung tumor supports  a diagnosis of poorly differentiated carcinoma and the malignancy appears morphologically similar to the prior  malignancies from both sites, but the staining profile in this small sample from the axillary mass more closely  correlates with the prior breast malignancy. Pathologic subclassification of this malignancy's primary location is not  definitive and clinical correlation is recommended definitively decide on possible primary location in this patient's  axillary mass sample.  Supplemental (2):  Additional immunohistochemical staining for progesterone receptor and HER2 are completed per clinician request  with following results:  Progesterone receptor: Negative; 0% nuclear tumor cell staining.  HER2: Negative; stain score = 0.  Supplemental (3):  [place] DIAGNOSIS:  FINAL DIAGNOSIS  Breast, right, needle core biopsy ([id number]; 06/17/2014): Invasive ductal carcinoma, Jadien grade III (of  III), with focal micropapillary features.  Immunohistochemical stains performed at the referring institution show that the tumor cells have the following  phenotype: - Estrogen receptor: Negative (0% tumor cells staining). - Progesterone receptor: Negative (0% tumor  cells staining). - HER2: Negative (score 0).  Lymph nodes, right, sentinel biopsy and lumpectomy ([id number]; 07/11/2014):  1. Right sentinel lymph node: A single (1) lymph node is negative for metastatic  carcinoma.  Immunohistochemical stains performed by the referring institution and reviewed at [facility] for cytokeratin are  negative, confirming the diagnosis.  2. Right breast: Invasive ductal carcinoma with micropapillary features, per report 11.5 mm in greatest dimension.  Foci suspicious for lymphovascular invasion identified. Margins of resection are free of tumor.  Immunohistochemical stains performed by the referring institution and reviewed at [facility] show that the tumor  cells are negative for p63 and show patchy positivity for CK 5/6.  Lung, right, needle core biopsy ([id number]; 02/14/2018): Squamous cell carcinoma.    Immunohistochemical stains performed by the referring institution show the tumor cells are strongly positive for p63  and CK 5/6, while negative for TTF-1 and CK7. These result support the diagnosis. Axilla, right, needle core biopsy  ([id number]; 03/11/2019): Lymph node positive for metastatic carcinoma, most consistent with breast primary  (see comment).  Immunohistochemical stains performed by the referring institution and reviewed at [facility] show that the tumor  cells are negative for p63, focally positive for CK 5/6, focally and weakly positive for JUAREZ-3, and strongly positive  for CK7. They are also negative for estrogen receptor, progesterone receptor, and HER2 JAM (score 0).  COMMENT:  Thank you for allowing me to review the case of this 73-year-old lady who recently underwent needle core biopsies  of a right axillary mass and who has a previous diagnosis of an invasive ductal carcinoma of the right breast, as well  as a squamous cell carcinoma of the lung. I am reviewing this case because of my special interest in breast  pathology.  I agree with your interpretation of this case. In my opinion, the lung tumor corresponds to a squamous cell  carcinoma and appears to be unrelated to the invasive ductal carcinoma of the breast. The axillary mass, in my  opinion,  corresponds to metastases of the breast primary. Although it is a poorly differentiated carcinoma, the  immunophenotype is most consistent with the one that the breast primary exhibited, and is inconsistent with a  metastatic squamous cell carcinoma. I did share the case with Dr. Anabel Stone, one of our pulmonary pathologists,  and she concurs with this interpretation.  Note: Report attached.  Performing location:  [facility]  [address] [place] [address]      LYMPH NODE, RIGHT AXILLA, BIOPSY: 6/16/21   - Metastatic poorly-differentiated carcinoma, consistent with breast primary  -ER, MT, and HER2 immunohistochemical hormonal markers are also negative  PD-L1 (22C3) SemiQuant IHC, Manual  Interpretation  Right axillary lymph node , specimen for PD-L1 immunohistochemistry studies (clone 22C3, [employer], [place], [place]; using a proprietary detection system) ([id number]):  Reported carcinoma site of origin: Breast  Result: The percent of PD-L1 positive cells based upon the total number of tumor cells (combined positive  score, CPS) is greater than or equal to 10.  Interpretation: Studies suggest that positive PD-L1 immunohistochemistry in tumor cells and/or tumorassociated  immune cells may predict tumor response to therapy with immune checkpoint inhibitors. This  result should not be used as the sole factor in determining treatment, as other factors (for example, tumor  mutation burden, and microsatellite instability) have been also studied as predictive markers.          CT neck/chest/abd/pelvis w/contrast 4/26/2019   The previously described right hilar mass is no longer visible.  There is persistent volume loss in the right middle lobe this appears similar to the prior PET-CT February 21, 2019.    Persistent abnormal right axillary node today measuring about 15 mm compared 18 mm prior.  The positioning of the adjacent nodes is slightly different likely  accounting for the slight difference in measurement.    Cluster of tree-in-bud nodular densities left lower lobe series 2, image 93.  This may be related to small airways disease though serial follow-up suggested.      PET/CT 6/20/2019   New and worsening hypermetabolic lymph nodes concerning for metastatic breast cancer as described above.  Tissue sampling would be required for definitive diagnosis.      2-D echo 6/27/2019   Normal left ventricular systolic function. The estimated ejection fraction is 55%  Concentric left ventricular remodeling.  Normal LV diastolic function.  Normal right ventricular systolic function.  Moderate left atrial enlargement.  Mild right atrial enlargement.  Mild aortic regurgitation.  Mild mitral regurgitation.  Mild tricuspid regurgitation.  Normal central venous pressure (3 mm Hg).  The estimated PA systolic pressure is 25 mm Hg      PET/CT 9/19/2019   Favorable response to therapy with interval decrease in size and uptake of several right axillary lymph nodes and a supraclavicular lymph node.  Mild residual activity may indicate viable tumor.    No new hypermetabolic findings worrisome for malignancy.    PET/CT 2/28/2020  1.  No evidence of hypermetabolic tumor.  Continued improvement of the right axilla status post adjuvant radiation.    2.  No new hypermetabolic lesions      CTA 6/17/2020  1. No evidence of pulmonary embolus. No aortic dissection.   2. There is no evidence for new or progressive disease in the chest as compared to PET/CT 6/20/2019 in this patient with history of right breast cancer and right lung neoplasm. The patient does have a more recent PET/CT 2/28/2020 from an outside facility per the electronic medical record although images are not available for direct comparison. Correlation with these images may be beneficial. Continued long-term surveillance recommended.  3. Stable appearance of the treated tumor in the right hilum/middle lobe.  4. Stable treated right  breast cancer and axillary adenopathy without evidence for developing breast mass or new right axillary adenopathy.  5. Stable tiny nodularity/tree-in-bud densities in the left lung, probably postinfectious or inflammatory.  6. Wall thickening of the esophagus may be correlated for esophagitis. Possible tiny hiatus hernia.  7. Slight bronchial wall thickening may be correlated for bronchitis.  8. Mild to moderate emphysema as before.  9. Reflux of contrast into the IVC and hepatic veins is nonspecific but may be correlated for elevated right heart pressures.  10. Coronary calcifications and/or stents similar to prior.    Echo 5/21/2020  Technically difficult study.   Normal left ventricular systolic function.   Left ventricular ejection fraction is estimated at 55%.   Severe mitral annular calcification.   Mild mitral valve regurgitation.   Aortic valve sclerosis without stenosis or regurgitation.     PFTs 6/17/2020  Spirometry reveals a very severe obstructive lung defect, which does not significantly improve post bronchodilators. Lung volumes revealed air trapping. Diffusing capacity was moderately impaired.        Del 6/26/2020  BI-RADS Category:   Overall: 2 - Benign      PET/CT 10/23/2020   Impression:     Stable mildly hypermetabolic right axillary lymph node/nodular density contralateral to the site of injection.  Differential considerations include metastatic lymph node, reactive lymph node from benign right upper extremity process, and fat necrosis.  Recommend physical examination of the upper extremity and consideration of ultrasound for further evaluation of the node/nodule and possible image guided biopsy.  Otherwise, attention on follow-up.     Otherwise, no other hypermetabolic disease identified      Mammo diagnostic right /US 11/4/2020   Impression:   1. No suspicious finding either on mammogram or ultrasound at the site of the palpable lump in the right breast at 10:00 o'clock. A bilateral  mammogram is recommended in June 2020 to return to the patient to annual screening.   2. Redemonstration of the morphologically abnormal lymph node in the right axilla that contains a biopsy clip, compatible with the known recurrence metastatic breast cancer that has been treated with chemotherapy. The appearance of this lymph node is unchanged from 06/26/2020 and overall appears smaller when compared to 03/11/2019.     Abd US 12/11/2020   Impression:     1. Echogenic liver likely representing hepatic steatosis.  2. Right upper quadrant ultrasound otherwise is unremarkable.     PET/CT 10/14/21     Impression:     1.  No definite evidence of hypermetabolic tumor.  Interval resolution of hypermetabolic right axillary lymph nodes.  No new hypermetabolic findings.     2.  Persistent low-grade diffuse cutaneous uptake which is nonspecific.    MRI shoulder w w/out contrast 1/26/22   Impression:     Mild edema and postcontrast enhancement at the myotendinous junction of the supraspinatus and infraspinatus, it is nonspecific and could be due to degenerative change or tendinosis.     Small area of interstitial tear at the footprint insertion of the infraspinatus tendon.     No large rotator cuff tear.     No advanced degenerative change.     No osseous lesions.     PET/CT 1/26/22  Impression:In this patient with breast cancer, there is no evidence of hypermetabolic tumor to suggest recurrent or metastatic disease.   Less extensive but, nonspecific, low-grade diffuse cutaneous uptake of the right breast.       PET/CT 4/27/22  Impression:     1.  No FDG avid disease to suggest recurrence or metastatic disease.     Unchanged right breast skin thickening with mild FDG uptake.       PET/CT 7/13/22   Impression:     In this patient with lung and breast cancer, there is new left lower lobe opacification with mild radiotracer uptake which may represent aspiration, pneumonia, or malignancy.  Tissue sampling would be required for  definitive diagnosis.     Unchanged right breast skin thickening with mild radiotracer uptake.    CT chest w/contrast 8/25/22    Decreasing patchy pulmonary consolidation within the left lower lobe when compared to prior PET-CT scan dated 07/13/2022.  The overall appearance of the patchy consolidation as well as the moderate improvement when compared to the prior study suggest a benign etiology such as left lower lobe pneumonia.     Persistent band of atelectasis/scarring within the right middle lobe, stable dating back to 04/26/2019.     Coronary artery atherosclerosis in a multi vessel distribution.     There are no measurable lesions per RECIST criteria.    PET /CT  11/21/22 shows New right lower lobe infiltrate worrisome for pneumonia or aspiration.Chronic infiltrate right middle lobe.   Resolution of the left lower lobe infiltrate.    Mammo 7/26/22  BI-RADS Category:   Overall: 2 - Benign        CT chest with contrast 11/21/22    Impression:     New right lower lobe infiltrate worrisome for pneumonia or aspiration.     Chronic infiltrate right middle lobe.     Resolution of the left lower lobe infiltrate.     Coronary artery calcifications.         PET/CT 1/11/23  Impression:     1. In this patient with lung and breast cancer, there is stable right breast skin thickening with mild radiotracer uptake.  2. Interval resolution of hypermetabolic opacification in the left lower lobe as compared to FDG PET-CT 07/14/2022.  Interval improvement in patchy opacities in the right lower lobe as compared to CT 11/21/2022    .                Electronically Signed By: Tapan Young MD 4/16/2023 23:17 CDT, [facility]  Narrative    PET/CT 4/14/23  [facility]  HISTORY:       Malignant neoplasm of female breast, unspecified estrogen receptor status, unspecified laterality, unspecified site of breast (CMS/HCC).       ICD10:  C50.919   Malignant neoplasm of female breast, unspecified estrogen receptor status,  unspecified laterality, unspecified site of breast (CMS/HCC)       REFERENCE EXAMS:     7/13/2022 PET CT ([facility] (no images, report only)     6/20/2019 PET CT ([facility])       TECHNIQUE:     PET/CT with attenuation correction.     Dose:  13.62 mCi of FDG-18     Injection site:  left wrist     Field of view:  Skull base to thighs.     Arm position:  above head     Baseline glucose:  128 mg/dL       FINDINGS:       All SUV values are max SUV.     Head/Neck:     Physiologic uptake of radiotracer.         Thorax:     Right portacath.       Cutaneous thickening in the right breast (SUV=1.58).       Calcification of the mitral valve, similar to 6/20/2019.       Coronary artery atherosclerotic calcification.     Atelectasis/scarring along the inferior aspect of the right major fissure, similar to 6/20/2019.         Abdomen/Pelvis:     Physiologic uptake in the genitourinary system.     Physiologic uptake in the intestines.       Patchy atherosclerotic calcification in the aorta and iliac arteries.       Impression      No opacity in the left lower lobe on the current exam to correspond to a left lower lobe opacity described on the 7/13/2022 PET CT report.  Images from the 7/13/2022 PET CT are not available at the time of this report.       Cutaneous thickening in the right breast with mild uptake.  There was a similar finding described on the 71/3/2022 PET CT report.           Electronically Signed By: Tapan Young MD 4/16/2023 23:17 CDT, [facility]          Mammo screening bilateral 8/7/23  BI-RADS Category: Overall: 2 - Benign     PET /CT ( NOT DOTATATE) 4/14/23 No opacity in the left lower lobe on the current exam to correspond to a left lower lobe opacity described on the 7/13/2022 PET CT report.  Images from the 7/13/2022 PET CT are not available at the time of this report.   Cutaneous thickening in the right breast with mild uptake.  There was a similar finding described on the 71/3/2022 PET  CT report.       PET/CT 11/14/23    Impression:     Similar appearing right breast skin thickening with mild hypermetabolic activity.  No new focal abnormal tracer uptake elsewhere.     Assessment:       1. Recurrent breast cancer, unspecified laterality    2. History of lung cancer    3. Chronic obstructive pulmonary disease, unspecified COPD type    4. Chronic heart failure with preserved ejection fraction                Plan:     1.,2.   Pt with hx of Stage 1A invasive ductal carcinoma rt breast s/p rt segmental mastectomy and SLN bx 7/11/2014 ER/WV neg HER 2 jose maria neg [id number](i-).  S/p adjuvant RT completed 9/2014 Pt declined adjuvant chemo  Pt  hospitalized 11/2017 with acute-on-chronic  resp failure  Abnormal CT imaging revealing Large right hilar mass with involvement of  adjacent segmental pulmonary arterial branches and bronchi with associated postobstructive atelectasis  and involvement of mediastinal and axillary adenopathy   S/p rt axillary LN bx ( outside facility) - metastatic poorly differentiated carcinoma of unknown primary  site  status post  lung biopsy 1/31/2017 -benign lung tissue  PET/CT 1/26/2018   Intense FDG uptake within the known right infrahilar lung mass, compatible with malignancy.   Intensely hypermetabolic right axillary, retropectoral, and lower right cervical lymph nodes, compatible with metastases.  Abnormal FDG uptake along right breast skin thickening, new from prior chest CTA and mammogram.  PET/CT 11/14/23 Similar appearing right breast skin thickening with mild hypermetabolic activity.  No new focal abnormal tracer uptake elsewhere.  Repeat lung bx 2/14/2018 revealed Squamous Cell CA lung PD-L1 10% EGFR NEGALK NEG  Pt treated for advanced squamous cell CA of lung She s/p  cycle 6 of carboplatin/Abraxane Day1 completed 7/2/2018 ( day 15 held due to prolonged cytopenias)  She completed therapy with near CR     PET/CT 2/21/2019 showed increased size and irregularity of the right  axillary lymph node with increased FDG avidity     MAMMO/US rt breast 3/2019  reveals suspicious findings rt axilla LN.  Biopsy of the lymph node measuring 1.4 x 1.1 x 1.3     Pt s/p  rt axillary LN bxPositive for poorly differentiated carcinoma.- likely breast primary ERneg PRneg Her 2 neg    Outside review of specimen(s) revealed  lung tumor corresponds to a squamous cell carcinoma and appears to be unrelated to the invasive ductal carcinoma of the breast. It was determined The axillary mass, in my opinion, corresponded  to metastases of the breast primary. Although it is a poorly differentiated carcinoma, theimmunophenotype is most consistent with the one that the breast primary exhibited, and is inconsistent with a metastatic squamous cell carcinoma.     CT imaging 4/26/2019 persistent rt axillary LAD, no other sites of disease     Lymph node biopsy 3/11/2019 Right axilla, mass, core biopsy:Positive for poorly differentiated carcinoma.favor breast primary     PET/CT 6/20/2019  New and worsening hypermetabolic lymph nodes concerning for metastatic breast cancer     Previously Discussed  imaging findings in detail with patient which reveals new and worsening hypermetabolic melena metabolic lymph nodes including hypermetabolic supraclavicular node.  Therefore, at treatment option will be systemic chemotherapy in light of the recent imaging findings.  She will be followed by her surgical oncologist Dr. Christopher      IT was determined to proceed with systemic chemotherapy   S/p  AC [id number] x 3 wks x 4 wks completed 9/6/2019   ( pt has not received in past)      PET/CT 9/19/2019 shows disease response with interval decrease in size and uptake of several right axillary lymph nodes and a supraclavicular lymph node.  Mild residual activity may indicate viable tumor.No new hypermetabolic findings worrisome for malignancy.    Pt followed by  Breast Surgery and plan was  for possible   ALND. Pt with Recurrent cancer in her  right axilla and right supraclavicular fossa.   She completed chemotherapy 9/6/2019 with near CR  It was determined  Radiation will be given to the original areas of hypermetabolic activity in the right axilla and right supraclavicular region    Pt completed  RT 12/16/2019     PET/CT 1/14/21 shows   New right axillary hypermetabolic lymph nodes with preserved normal architecture suggestive of reactive nodes.  Stable appearing previously identified hypermetabolic lymph node with mild increase in surrounding fat stranding.        Findings previously d/w with treating breast surgeon and it was determined to cont to monitor and close f/u as pt is heavily pre treated, has received RT to site   Pt acknowledged understanding and agreeable with plan     PET/CT imaging  5/20/21 shows Continued increase in both size and hypermetabolic activity of right axillary level 1 lymph nodes a new, mildly hypermetabolic cutaneous thickening of the right breast.     Right breast, skin, punch biopsy:Negative for carcinoma    LYMPH NODE, RIGHT AXILLA, BIOPSY: 6/16/21   - Metastatic poorly-differentiated carcinoma, consistent with breast primary triple neg  PD-L1 immunohistochemistry studies(combined positive score, CPS) is greater than or equal to 10   PET/CT showed  No definite evidence of hypermetabolic tumor.  Interval resolution of hypermetabolic right axillary lymph nodes.  No new hypermetabolic findings.      S/p C16 pembrolizumab 6/15/22   Last  PET/CT imaging shows  new left lower lobe opacification with mild radiotracer uptake which may represent aspiration, pneumonia, or malignancy.  Recent follow-up imaging studies decreasing patchy pulmonary consolidation within the left lower lobe when compared to prior PET-CT scan dated 07/13/2022.  Plan to remain off of therapy   Follow-up PET/CT imaging 1/11/23 Interval resolution of hypermetabolic opacification in the left lower lobe as compared to FDG PET-CT 07/14/2022.  Interval  improvement in patchy opacities in the right lower lobe as compared to CT 11/21/2022   follow up PET imaging [id number] -  performed at [facility] .([employer] mobile was not available at  since broken )No opacity in the left lower lobe on the current exam to correspond to a left lower lobe opacity described on the 7/13/2022 PET CT report.  Images from the 7/13/2022 PET CT are not available at the time of this report.       Plan follow up PET imaging        3.  F/b Pulmonology  Pt on supp 02 at night   Cont MDI and O2 as prescribed     4. K supp x 3 days ( pt has at home)     Check Mag level         PET prior to f/u   Cbc,cmp, mag prior to f/u 2 mos     Advance Care Planning     Power of   I previously  initiated the process of advance care planning today and explained the importance of this process to the patient.  I introduced the concept of advance directives to the patient, as well. Then the patient received detailed information about the importance of designating a Health Care Power of  (HCPOA). She was also instructed to communicate with this person about their wishes for future healthcare, should she become sick and lose decision-making capacity. The patient has previously appointed a HCPOA. After our discussion, the patient has decided to complete a HCPOA and has appointed her son and niece and  I spent a total time of 16 minutes discussing this issue with the patient.         Cc: Swetha Katz M.D.         MD Ranjan Roberson MD

## 2024-02-19 NOTE — PLAN OF CARE
Patient arrived to unit for Port flush and labs from port. Pt continues with LOGAN, fatigue, chronic cough. Plan of care reviewed, patient agreeable to plan. Port with + blood return, flushes easily, Heparin administered to dwell.VSS. Discharge instructions reviewed, patient instructed to return 4/15/24. Patient ambulated off unit unassisted by self. Patient in NAD at time of discharge.

## 2024-02-29 ENCOUNTER — OFFICE VISIT (OUTPATIENT)
Dept: FAMILY MEDICINE | Facility: CLINIC | Age: 79
End: 2024-02-29
Payer: MEDICARE

## 2024-02-29 VITALS
TEMPERATURE: 97 F | DIASTOLIC BLOOD PRESSURE: 78 MMHG | SYSTOLIC BLOOD PRESSURE: 130 MMHG | BODY MASS INDEX: 27.97 KG/M2 | WEIGHT: 157.88 LBS | OXYGEN SATURATION: 95 % | HEART RATE: 62 BPM | RESPIRATION RATE: 16 BRPM | HEIGHT: 63 IN

## 2024-02-29 DIAGNOSIS — S39.012A LUMBAR STRAIN, INITIAL ENCOUNTER: Primary | ICD-10-CM

## 2024-02-29 PROCEDURE — 3078F DIAST BP <80 MM HG: CPT | Mod: HCNC,CPTII,S$GLB, | Performed by: FAMILY MEDICINE

## 2024-02-29 PROCEDURE — 1125F AMNT PAIN NOTED PAIN PRSNT: CPT | Mod: HCNC,CPTII,S$GLB, | Performed by: FAMILY MEDICINE

## 2024-02-29 PROCEDURE — 99999 PR PBB SHADOW E&M-EST. PATIENT-LVL III: CPT | Mod: PBBFAC,HCNC,, | Performed by: FAMILY MEDICINE

## 2024-02-29 PROCEDURE — 1101F PT FALLS ASSESS-DOCD LE1/YR: CPT | Mod: HCNC,CPTII,S$GLB, | Performed by: FAMILY MEDICINE

## 2024-02-29 PROCEDURE — 99214 OFFICE O/P EST MOD 30 MIN: CPT | Mod: HCNC,25,S$GLB, | Performed by: FAMILY MEDICINE

## 2024-02-29 PROCEDURE — 96372 THER/PROPH/DIAG INJ SC/IM: CPT | Mod: HCNC,S$GLB,, | Performed by: FAMILY MEDICINE

## 2024-02-29 PROCEDURE — 1160F RVW MEDS BY RX/DR IN RCRD: CPT | Mod: HCNC,CPTII,S$GLB, | Performed by: FAMILY MEDICINE

## 2024-02-29 PROCEDURE — 1157F ADVNC CARE PLAN IN RCRD: CPT | Mod: HCNC,CPTII,S$GLB, | Performed by: FAMILY MEDICINE

## 2024-02-29 PROCEDURE — 1159F MED LIST DOCD IN RCRD: CPT | Mod: HCNC,CPTII,S$GLB, | Performed by: FAMILY MEDICINE

## 2024-02-29 PROCEDURE — 3075F SYST BP GE 130 - 139MM HG: CPT | Mod: HCNC,CPTII,S$GLB, | Performed by: FAMILY MEDICINE

## 2024-02-29 PROCEDURE — 3288F FALL RISK ASSESSMENT DOCD: CPT | Mod: HCNC,CPTII,S$GLB, | Performed by: FAMILY MEDICINE

## 2024-02-29 RX ORDER — KETOROLAC TROMETHAMINE 30 MG/ML
30 INJECTION, SOLUTION INTRAMUSCULAR; INTRAVENOUS
Status: COMPLETED | OUTPATIENT
Start: 2024-02-29 | End: 2024-02-29

## 2024-02-29 RX ORDER — METHYLPREDNISOLONE 4 MG/1
TABLET ORAL
COMMUNITY
Start: 2023-12-21 | End: 2024-05-21

## 2024-02-29 RX ORDER — HYDROCODONE BITARTRATE AND ACETAMINOPHEN 7.5; 325 MG/1; MG/1
1 TABLET ORAL EVERY 6 HOURS PRN
COMMUNITY
Start: 2024-01-05 | End: 2024-05-21

## 2024-02-29 RX ORDER — OMEPRAZOLE 40 MG/1
CAPSULE, DELAYED RELEASE ORAL
COMMUNITY
End: 2024-05-21

## 2024-02-29 RX ORDER — AZELASTINE 1 MG/ML
SPRAY, METERED NASAL
COMMUNITY
Start: 2023-12-21 | End: 2024-05-21

## 2024-02-29 RX ORDER — DICLOFENAC SODIUM 50 MG/1
50 TABLET, DELAYED RELEASE ORAL 2 TIMES DAILY PRN
Qty: 30 TABLET | Refills: 0 | Status: SHIPPED | OUTPATIENT
Start: 2024-02-29 | End: 2024-04-05

## 2024-02-29 RX ORDER — METHOCARBAMOL 500 MG/1
500 TABLET, FILM COATED ORAL 2 TIMES DAILY PRN
Qty: 20 TABLET | Refills: 0 | Status: SHIPPED | OUTPATIENT
Start: 2024-02-29 | End: 2024-04-05

## 2024-02-29 RX ADMIN — KETOROLAC TROMETHAMINE 30 MG: 30 INJECTION, SOLUTION INTRAMUSCULAR; INTRAVENOUS at 09:02

## 2024-02-29 NOTE — PROGRESS NOTES
Subjective     Patient ID: Ade Castellano is a 78 y.o. female.    Chief Complaint: Back Pain (Lower back down to leg)    Back Pain  This is a chronic problem. The current episode started 1 to 4 weeks ago. The problem occurs constantly. The problem is unchanged. The pain is present in the lumbar spine (hard to straighten up after lying down or sitting for long periods time.). The quality of the pain is described as aching. The symptoms are aggravated by standing. Associated symptoms include leg pain. Pertinent negatives include no bladder incontinence, bowel incontinence, numbness, tingling or weakness. Treatments tried: tylenol. The treatment provided no relief.       Past Medical History:   Diagnosis Date    Abnormal stress test 8/5/2020    Acute respiratory failure with hypoxia and hypercapnia 11/29/2017    Angina pectoris     Arthritis     Bell's palsy     left facial weakness    Breast cancer     RIGHT    CAD (coronary artery disease)     Cervical cancer     Chronic bronchitis     Chronic heart failure with preserved ejection fraction 8/5/2020    Chronic heart failure with preserved ejection fraction 8/5/2020    COPD (chronic obstructive pulmonary disease)     Dr. Katz    Dental bridge present     Emphysema of lung     H/O colonoscopy 06/2017    due for repeat colonsocopy in 6/2018    History of Bell's palsy     History of heart artery stent     Dr. Ortiz  x2 stents    Hyperlipidemia     Hypertension     Lung cancer     Myocardial infarction     SUNITHA (obstructive sleep apnea)     intolerant to mask    SUNITHA (obstructive sleep apnea)     intolerant to mask     Pneumonia     Pneumonia due to other staphylococcus     PUD (peptic ulcer disease)     Severe anemia 6/11/2018    Sleep apnea     Vaginal delivery     x1      Past Surgical History:   Procedure Laterality Date    ADENOIDECTOMY      BREAST BIOPSY Right     x3    BREAST LUMPECTOMY      BREAST SURGERY      lumpectomy right side     CERVIX SURGERY      cone     COLONOSCOPY N/A 3/17/2017    Procedure: COLONOSCOPY;  Surgeon: Julio Rudd MD;  Location: [facility] ENDO;  Service: Endoscopy;  Laterality: N/A;    COLONOSCOPY N/A 6/30/2017    Procedure: COLONOSCOPY;  Surgeon: Julio Rudd MD;  Location: [facility] ENDO;  Service: Endoscopy;  Laterality: N/A;    COLONOSCOPY N/A 11/28/2018    Procedure: COLONOSCOPY;  Surgeon: Nura Urbina MD;  Location: [facility] ENDO;  Service: Endoscopy;  Laterality: N/A;    COLONOSCOPY N/A 1/29/2019    Procedure: COLONOSCOPY;  Surgeon: Emmanuel Perez MD;  Location: [facility] ENDO;  Service: Endoscopy;  Laterality: N/A;  confirmed appt-SP    COLONOSCOPY N/A 7/26/2022    Procedure: COLONOSCOPY;  Surgeon: James Healy MD;  Location: [facility];  Service: Endoscopy;  Laterality: N/A;  fully vaccinated -  mediport in chest -     CORONARY ANGIOPLASTY WITH STENT PLACEMENT      ESOPHAGEAL MANOMETRY WITH MEASUREMENT OF IMPEDANCE N/A 7/10/2023    Procedure: MANOMETRY, ESOPHAGUS, WITH IMPEDANCE MEASUREMENT;  Surgeon: Miller Phillips MD;  [facility] ([facility] F[facility]  Service: Gastroenterology;  Laterality: N/A;  pt on oxygen 2.5L  pt requested Tuesday only  5/31 referred by Dr. Miller Phillips/instr. mailed-st  7/3-r/s, updated instructions reviewed with pt in detail via phone, pt verbalized understanding-[facility]    ESOPHAGOGASTRODUODENOSCOPY N/A 1/25/2021    Procedure: EGD (ESOPHAGOGASTRODUODENOSCOPY);  Surgeon: Dmitry Gonzalez MD;  Location: [facility];  Service: Endoscopy;  Laterality: N/A;  rapid test >50 miles -ml    ESOPHAGOGASTRODUODENOSCOPY N/A 11/8/2022    Procedure: EGD (ESOPHAGOGASTRODUODENOSCOPY);  Surgeon: Tapan Stevenson MD;  Location: [facility] ENDO;  Service: Endoscopy;  Laterality: N/A;  w/dilation  fully vaccinated, instructions mailed-\[facility]\""  11/4 pt called did not receive instructions, does not have portal or email, went over prep instructions and medication instructions with pt on the phone -LW    ESOPHAGOGASTRODUODENOSCOPY N/A 9/19/2023     Procedure: EGD (ESOPHAGOGASTRODUODENOSCOPY);  Surgeon: Miller Phillips MD;  Location: [facility] ENDO;  Service: Endoscopy;  Laterality: N/A;  botox injection  inst mailed    EYE SURGERY Bilateral 2018    cataract     LEFT HEART CATHETERIZATION Left 2020    Procedure: Left heart cath, rra, noon;  Surgeon: Guevara Skelton MD;  Location: [facility] CATH LAB;  Service: Cardiology;  Laterality: Left;  RN PREOP 2020---COVID NEGATIVE ON     PORTACATH PLACEMENT Right 2018    sweat glands axillary regions Bilateral     TONSILLECTOMY       Family History   Problem Relation Age of Onset    Cancer Mother         breast    Heart disease Mother     Breast cancer Mother     Cancer Father         lung-smoker     Cancer Sister         lung-smoker     Heart attack Sister     Cancer Maternal Grandmother     Heart disease Maternal Grandmother     Cancer Maternal Grandfather     Heart disease Maternal Grandfather     Cancer Paternal Grandmother     Cancer Paternal Grandfather     Cancer Sister         mets not sure where it started     COPD Sister     Heart disease Sister     Kidney cancer Son     Colon cancer Neg Hx     Esophageal cancer Neg Hx      Social History     Socioeconomic History    Marital status: Single   Tobacco Use    Smoking status: Former     Current packs/day: 0.00     Average packs/day: 0.3 packs/day for 50.0 years (12.5 ttl pk-yrs)     Types: Cigarettes     Start date: 1967     Quit date: 2017     Years since quittin.3    Smokeless tobacco: Never    Tobacco comments:     7 years since smoked    Substance and Sexual Activity    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     Drug use: No    Sexual activity: Not Currently     Social Determinants of Health     Financial Resource Strain: Low Risk  (3/7/2023)    Overall Financial Resource Strain (CARDIA)     Difficulty of Paying Living Expenses: Not hard at all   Food Insecurity: No Food Insecurity (3/7/2023)    Hunger Vital Sign     Worried  "About Running Out of Food in the Last Year: Never true     Ran Out of Food in the Last Year: Never true   Transportation Needs: No Transportation Needs (3/7/2023)    PRAPARE - Transportation     Lack of Transportation (Medical): No     Lack of Transportation (Non-Medical): No   Physical Activity: Inactive (3/7/2023)    Exercise Vital Sign     Days of Exercise per Week: 0 days     Minutes of Exercise per Session: 0 min   Stress: No Stress Concern Present (3/7/2023)    [employer] [place] [employer] - Occupational Stress Questionnaire     Feeling of Stress : Not at all   Social Connections: Moderately Isolated (3/7/2023)    Social Connection and Isolation Panel [NHANES]     Frequency of Communication with Friends and Family: More than three times a week     Frequency of Social Gatherings with Friends and Family: Twice a week     Attends Synagogue Services: More than 4 times per year     Active Member of Clubs or Organizations: No     Attends Club or Organization Meetings: Never     Marital Status: Never    Housing Stability: Low Risk  (3/7/2023)    Housing Stability Vital Sign     Unable to Pay for Housing in the Last Year: No     Number of Places Lived in the Last Year: 1     Unstable Housing in the Last Year: No       Review of Systems   Gastrointestinal:  Negative for bowel incontinence.   Genitourinary:  Negative for bladder incontinence.   Musculoskeletal:  Positive for back pain and leg pain.   Neurological:  Negative for tingling, weakness and numbness.          Objective   Vitals:    02/29/24 0904   BP: 130/78   Pulse: 62   Resp: 16   Temp: 97.4 °F (36.3 °C)   TempSrc: Oral   SpO2: 95%   Weight: 71.6 kg (157 lb 13.6 oz)   Height: 5' 3" (1.6 m)       Physical Exam  Constitutional:       Appearance: Normal appearance.   HENT:      Head: Normocephalic and atraumatic.   Musculoskeletal:      Lumbar back: Spasms and tenderness present. No edema, signs of trauma or bony tenderness. Decreased range " of motion. Negative right straight leg raise test and negative left straight leg raise test.   Neurological:      Mental Status: She is alert.            Assessment and Plan     1. Lumbar strain, initial encounter  -     ketorolac injection 30 mg  -     methocarbamoL (ROBAXIN) 500 MG Tab; Take 1 tablet (500 mg total) by mouth 2 (two) times daily as needed.  Dispense: 20 tablet; Refill: 0    Other orders  -     diclofenac (VOLTAREN) 50 MG EC tablet; Take 1 tablet (50 mg total) by mouth 2 (two) times daily as needed.  Dispense: 30 tablet; Refill: 0                 No follow-ups on file.

## 2024-02-29 NOTE — PROGRESS NOTES
Health Maintenance Due   Topic     TETANUS VACCINE      Shingles Vaccine (1 of 2) hx chickenpox ; inform pt can get vaccine at pharmacy.    RSV Vaccine (Age 60+ and Pregnant patients) (1 - 1-dose 60+ series)     COVID-19 Vaccine (4 - 2023-24 season)

## 2024-03-19 ENCOUNTER — HOSPITAL ENCOUNTER (OUTPATIENT)
Dept: RADIOLOGY | Facility: HOSPITAL | Age: 79
Discharge: HOME | End: 2024-03-19
Attending: INTERNAL MEDICINE
Payer: MEDICARE

## 2024-03-19 DIAGNOSIS — C50.919 RECURRENT BREAST CANCER, UNSPECIFIED LATERALITY: ICD-10-CM

## 2024-03-19 LAB — POCT GLUCOSE: 116 MG/DL (ref 70–110)

## 2024-03-19 PROCEDURE — 78815 PET IMAGE W/CT SKULL-THIGH: CPT | Mod: TC,HCNC

## 2024-03-19 PROCEDURE — A9552 F18 FDG: HCPCS | Mod: HCNC | Performed by: INTERNAL MEDICINE

## 2024-03-19 PROCEDURE — 78815 PET IMAGE W/CT SKULL-THIGH: CPT | Mod: 26,HCNC,PS, | Performed by: STUDENT IN AN ORGANIZED HEALTH CARE EDUCATION/TRAINING PROGRAM

## 2024-03-19 RX ORDER — FLUDEOXYGLUCOSE F18 500 MCI/ML
12 INJECTION INTRAVENOUS
Status: COMPLETED | OUTPATIENT
Start: 2024-03-19 | End: 2024-03-19

## 2024-03-19 RX ADMIN — FLUDEOXYGLUCOSE F-18 12.8 MILLICURIE: 500 INJECTION INTRAVENOUS at 09:03

## 2024-03-21 DIAGNOSIS — I20.89 STABLE ANGINA: Chronic | ICD-10-CM

## 2024-03-21 RX ORDER — ISOSORBIDE MONONITRATE 30 MG/1
TABLET, EXTENDED RELEASE ORAL
Qty: 90 TABLET | Refills: 3 | Status: SHIPPED | OUTPATIENT
Start: 2024-03-21

## 2024-03-21 NOTE — TELEPHONE ENCOUNTER
No care due was identified.  [facility] Embedded Care Due Messages. Reference number: [id number].   3/21/2024 4:12:59 PM CDT

## 2024-03-21 NOTE — TELEPHONE ENCOUNTER
Refill Decision Note   Ade Castellano  is requesting a refill authorization.  Brief Assessment and Rationale for Refill:  Approve     Medication Therapy Plan:        Comments:     Note composed:4:19 PM 03/21/2024

## 2024-04-04 DIAGNOSIS — S39.012A LUMBAR STRAIN, INITIAL ENCOUNTER: ICD-10-CM

## 2024-04-04 NOTE — TELEPHONE ENCOUNTER
No care due was identified.  [facility] Embedded Care Due Messages. Reference number: [id number].   4/04/2024 10:33:23 AM CDT

## 2024-04-05 RX ORDER — METHOCARBAMOL 500 MG/1
500 TABLET, FILM COATED ORAL 2 TIMES DAILY PRN
Qty: 20 TABLET | Refills: 0 | Status: SHIPPED | OUTPATIENT
Start: 2024-04-05 | End: 2024-04-05

## 2024-04-05 RX ORDER — DICLOFENAC SODIUM 50 MG/1
50 TABLET, DELAYED RELEASE ORAL 2 TIMES DAILY PRN
Qty: 30 TABLET | Refills: 0 | Status: SHIPPED | OUTPATIENT
Start: 2024-04-05 | End: 2024-04-05

## 2024-04-08 NOTE — TELEPHONE ENCOUNTER
----- Message from Ben John sent at 10/2/2023  9:02 AM CDT -----  Regarding: pt called  Type: Patient Call Back         Who called: CHOLO HARRIS [[id number]]         What is the request in detail: Pt is calling due to not feeling from procedure. Please call pt.          Can the clinic reply by [facility]? No         Would the patient rather a call back or a response via [facility]? Call back         Best call back number:           [phone]               Thank you.     Bleeding that does not stop/Swelling that gets worse/Pain not relieved by Medications/Fever greater than (need to indicate Fahrenheit or Celsius)/Wound/Surgical Site with redness, or foul smelling discharge or pus/Numbness, tingling, color or temperature change to extremity

## 2024-04-14 NOTE — PROGRESS NOTES
Subjective:       Patient ID: Ade Castellano is a 78 y.o. female.    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 The PET(+) disease in the right axilla and right supraclavicular fossa will be boosted at 200 cGy per fraction to 6000 cGy total dose.  S/p LYMPH NODE, RIGHT AXILLA, BIOPSY: 6/16/21 . Metastatic poorly-differentiated carcinoma, c/w breast primary ERnegPRneg Her2 neg  PD-L1 (22C3) IHC CPS> is greater than or equal to 10  Pembrolizumab 7/22/21 -6/15/22         Prior Hx:  77 y/o female with history of  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  And Rt  breast Haja/p  cycle 6 of carboplatin/Abraxane 7/2/2018   She has  History of Stage 1A  Rt breast cancer Grade 3, ER/NC neg , Her 2 jose maria neg s/p lumpectomy 7/11/2014 and s/p adjuvant RT 9/2014 Pt declined Adjuvant chemo.Pathology showed a 1.15 cm, grade 3 infiltrating ductal carcinoma.  One sentinel lymph node was negative for malignancy.  Margins were negative and the closest margin was 1 cm.  She was staged  pT1c  pN0 cM0 stage IA.  She declined adjuvant chemo therapy.  She completed post operative radiation therapy at 400 cGy per fraction to 4000 cGy 9/2014  Pt hospitalized 11/2017 for  acute on chronic respiratory failure requiring intubation and ventilation. Has large lung mass in right lung with probable post obstructive pneumonia resulting in COPD exacerbation.CTA chest  11/29/2017 revealed   Large right hilar mass with involvement of adjacent segmental pulmonary arterial branches and bronchi with associated postobstructive atelectasis and volume loss in the RML  with adjacent ground glass opacity concerning for underlying neoplastic process. Mediastinal and axillary adenopathy, with a right axillary lymph node measuring up to 2.0 cm in short axis diameter.She underwent rt axillary LN bx at outside facility- on 1/16/2018 at [facility]. Pathology revealed metastatic poorly differentiated carcinoma of unknown primary site. She next underwent lung bx at [facility] 1/31/2018  benign lung tissue. Repeat Right Lung Bx 2/14/2018 Pathology reveals Squamous cell carcinoma PD-L1 10% low expression EGFR NEG ALK NEG BRAF NEG. Outside slides axillary LN specimen were reviewed/comparison to lung bx findings . She completed    cycle 6 of carboplatin/Abraxane completed 7/2018 .PET/CT  4/19/2018 revealed there has been a excellent response to therapy.  At least 90% reduction in malignant activity.PET/CT 2/21/2019 increased size and irregularity of the right axillary lymph node with increased FDG avidityMAMMO/US rt breast 2/2019 revealed suspicious findings rt axilla LN.  Right axilla, mass, core biopsy: Positive for poorly differentiated carcinoma breast primary ER neg/IA neg Her2 neg.[facility] for outside reviewIt was determined  the lung tumor corresponds to a squamous cellcarcinoma and appears to be unrelated to the invasive ductal carcinoma of the breast. The axillary mass, in myopinion, corresponds to metastases of the breast primary. Although it is a poorly differentiated carcinoma, theimmunophenotype is most consistent with the one that the breast primary exhibited, and is inconsistent with metastatic squamous cell carcinoma. She was treated with  AC ( adriamycin 60m/m2/Cytoxan 600mg/m2)  q21d x 4 cycles completed 9/6/2019 . PET/CT 9/19/2019 shows disease response l decrease in size and  uptake of several right axillary lymph nodes and a supraclavicular lymph node.  Mild residual activity may indicate viable tumor.Pt followed by Rad/Onc. She was presented at [facility] tumor board and it was determined to proceed Radiation therapy only. No ALND due to concurrent lung and supraclavicular node. She  completed  RT 12/16/2019  To right axilla and right supraclavicular region PET/CT imaging  5/20/21 shows Continued increase in both size and hypermetabolic activity of right axillary level 1 lymph nodes a new, mildly hypermetabolic cutaneous thickening of the right breast. she underwent LYMPH NODE, RIGHT AXILLA, BIOPSY: 6/16/21 . Pathology showed Metastatic poorly-differentiated carcinoma, consistent with breast primary-ERneg/ PRneg  HER2  neg  Pt started on pembrolizumab 7/2021  Pt hospitalized 4/6/22 with hypokalemia and orthostatic hypotension  S/p C16 pembrolizumab 6/15/22  ( 400mg q6wks)      Interval    She continues with mild LOGAN-stable  She is f/b pulmonology, Dr. Katz  She continues with  arthralglas involving rt shoulder  She continues with chronic LBP , stable   No numbness/tingling in extremities  Dysphagia initially improved s/p  dilation,now   reports swallowing difficulties with solids  Appetite and weight stable  She has supp 02 at home  Advised by her treating pulmonologist to wear 02 at night               PrevHx:She had an abnormal mammogram 6/4/2014 whichRevealed a round solid mass 6mm in rt breast.She then underwent U/S guided core bx of rt breast mass on 6/17/2014.Pathology revealed infiltrating ductal carcinoma Grade 3 with tumorPresent in thin-walled spaces suggestive of lymphatic spaces. HormoneReceptor status on tumor specimen revealed ER negative 0% ,MS negative 0% and Her 2 jose maria negative.She subsequently underwent rt segmental mastectomy and SLN bxOn 7/11/2014. Pathology of rt breast lumpectomy revealed invasiveDuctal carcinoma with micropapillary pattern ( Invasive  "micropapillaryCa) with max tumor dimension 11.5mm with suggestion of tumor in Thin walled spaces c/w lymphovascular involvement. SurgicalMargins free of tumor, grade 3, ER/RI neg , Her 2 jose maria neg With [place] Lymph node negative for Neoplasm. Pathologic staging [id number](i-)Her mother was diagnosed with breast cancer in her 50's/        Review of Systems   Constitutional: Positive for mild  fatigue, stable No  activity change,  fever.   HENT: Positive for dysphagia. Negative for mouth sores, rhinorrhea.  Eyes: Negative for visual disturbance.   Respiratory: Positive for chronic  LOGAN,stable, Cough improved. Negative for wheezing.    Cardiovascular: Negative for chest pain.   Gastrointestinal:  Negative for abdominal pain and diarrhea.   Genitourinary: Negative for frequency.   Musculoskeletal: Positive for chronic LBP, stable   Skin: Negative for rash.   Neurological: Negative for dizziness and headaches.   Hematological: Negative for adenopathy.   Psychiatric/Behavioral: The patient is not nervous/anxious.        Objective:        Vitals:    04/15/24 1018   BP: 100/66   Pulse: (!) 53   Resp: 18   SpO2: 99%   Weight: 73.5 kg (162 lb 0.6 oz)   Height: 5' 2" (1.575 m)               Physical Exam   Constitutional: She is oriented to person, place, and time. She appears ill, coughing episodes  HENT:   Head: Normocephalic.   Eyes: Conjunctivae and lids are normal.No scleral icterus.   Neck: Normal range of motion. Neck supple. No thyromegaly present.   Cardiovascular: Normal rate, regular rhythm and normal heart sounds.   murmur heard.  Pulmonary/Chest: Breath sounds normal. She has no wheezes. She has no rales.   Abdominal: Soft. Bowel sounds are normal.  There is no tenderness. There is no rebound and no guarding.   Left breast- no masses or axillary LAD noted  Right breast- breast thickening inner quad    She has no cervical adenopathy.     She has rt axillary adenopathy.        Right: No supraclavicular adenopathy " present.        Left: No supraclavicular adenopathy present.   Neurological: She is alert and oriented to person, place, and time. No cranial nerve deficit. Coordination normal.   Skin: Skin is warm and dry. No ecchymosis, no petechiae and no rash noted. No erythema.   Psychiatric: She has a normal mood and affect.             CT a/p w/contrast 11/29/2018   1. No acute abnormality identified within the abdomen and pelvis.    2.  There are a few nonspecific prominent lymph nodes in the upper abdomen including a periesophageal lymph node which measures 1.0 cm in short axis diameter.    3.  Significant abdominal aortic atherosclerosis and abdominal aorta ectasia.    4.  Additional findings as above.    PET/CT 1/26/2018   1.  Intense FDG uptake within the known right infrahilar lung mass, compatible with malignancy.  It is unclear if this represents primary lung malignancy or metastatic breast cancer.    2.  Intensely hypermetabolic right axillary, retropectoral, and lower right cervical lymph nodes, compatible with metastases.    3.  Abnormal FDG uptake along right breast skin thickening, new from prior chest CTA and mammogram.  This may relate to localized edema/inflammation, though correlation with physical exam and mammography are recommended to exclude underlying inflammatory carcinoma.      MRI Brain w w/out contrast 2/9/2018  1.  No evidence of intracranial metastases.  2.  Sinus disease       Rt axillary LN bx at outside facility- on 1/16/2018 at [facility]  Pathology revealed metastatic poorly differentiated carcinoma of unknown primary  site 1/16/2018 at [facility]  TTF-1 negative, napsin negative  , cytokeratin 7 positive, cytokeratin 20 negative, p63 negative, cytokeratin 5/6 focal dim positivity    LUNG BIOPSY 1/31/2018  Pathology revealed benign lung tissue         SPECIMEN  1) Right Lung Bx 2/14/2018  Supplemental Diagnosis  Immunohistochemical stains show strong nuclear  staining for p63 in essentially all tumor cells and very strong  cytoplasmic and membrane staining for CK5/6, also in essentially all tumor cells. TTF-1 and CK7 are negative  within tumor cells but do stain native pulmonary elements present within the biopsy. A stain for mucicarmine is  negative. Positive and negative controls function appropriately.  Final diagnosis: Specimen submitted as right lung biopsy  -Squamous cell carcinoma  (Electronically Signed: 2018-02-20 09:12:07 )  Diagnosed by: Phong Thompson  FINAL PATHOLOGIC DIAGNOSIS  Fragments of pulmonary parenchyma (submitted as right lung biopsy):    FINAL PATHOLOGIC DIAGNOSIS 1. Lymph node, right axilla, biopsy, review of 10 outside slides [facility], JS [phone],  collected January 11, 2018: Metastatic poorly differentiated carcinoma (see comment).  The histologic section shows fibrous stroma infiltrated by nest of poorly differentiated malignant cells including  occasional dyskeratotic cells morphologically suggestive of metastatic squamous cell carcinoma.  Immunohistochemical stains are nonspecific; the cells are positive for cytokeratin 7 and cytokeratin 5/6 (focal) and  negative for cytokeratin 20, TTF-1, p63, GCDFP, mammoglobin, and CEA        PET/CT 2/21/2019  Increased size and irregularity of the right axillary lymph node with increased FDG avidity.  Although this would be atypical in location for lung carcinoma, the increased size and avidity must be concerning for metastatic disease in this patient with history of squamous cell lung cancer.     US Rt breast and mammo 2/26/2019  Impression:  Right  Lymph Node: Right axilla lymph node. Assessment: 4 - Suspicious finding. Biopsy is recommended.      BI-RADS Category:   Right: 4 - Suspicious  Overall: 4 - Suspicious     Recommendation:  Biopsy is recommended. Biopsy of the lymph node measuring 1.4 x 1.1 x 1.3 recommended , the one with the round shape configuration, corresponding to  the most recent PET CT finding.         Pathology 3/11/2019   FINAL PATHOLOGIC DIAGNOSIS  Right axilla, mass, core biopsy:  Positive for poorly differentiated carcinoma.  See comment.  Comment:  The biopsy from the right axilla mass shows a poorly differentiated carcinoma in background lymphoid tissue  with enlarged cells showing increased nuclear size, prominent nucleoli, moderate amounts of cytoplasm and mitotic  figures. Immunostains are completed and reveal the tumor cells to stain positively with cytokeratin AE 1/AE3, CK  5/6 and CK 7. The tumor cells are negative for CK 20, Estrogen receptor, p63 and TTF-1. All stains have  satisfactory positive and negative controls. The patient's prior cases will be reviewed and an additional stain for  JUAREZ-3 is pending in an attempt to pinpoint the primary site of this malignancy and results will follow in a  supplemental report.      Supplemental Diagnosis  3/11/2019  The current axillary mass is compared to the patient's prior right lung biopsy (case number [id number]) and the  current axillary mass is morphologically similar to the lung malignancy. Also, the current axillary mass is compared  to the patient's prior breast resection (Case number [id number]) and appears similar as well. P63 is negative in the  [id number] tumor and CK 5/6 shows scattered positive staining in the [id number] tumor.  Immunostain for JUAREZ-3 is completed and shows only rare weak to moderate staining within the tumor cell nuclei  with satisfactory positive and negative controls. This stain is positive in the surrounding lymphocytes. This staining  pattern is non-specific and does not definitively differentiate between a lung and breast primary malignancy in this  right axilla mass biopsy. The staining profile of the current axillary mass match more closely with the previous  breast carcinoma (due to the p63 negativity in both the 2014 breast cancer and the current axillary mass lesion but  p63  positivity in the lung mass from 2018).  This staining profile, together with the comparison with the patient's prior breast tumor and prior lung tumor supports  a diagnosis of poorly differentiated carcinoma and the malignancy appears morphologically similar to the prior  malignancies from both sites, but the staining profile in this small sample from the axillary mass more closely  correlates with the prior breast malignancy. Pathologic subclassification of this malignancy's primary location is not  definitive and clinical correlation is recommended definitively decide on possible primary location in this patient's  axillary mass sample.  Supplemental (2):  Additional immunohistochemical staining for progesterone receptor and HER2 are completed per clinician request  with following results:  Progesterone receptor: Negative; 0% nuclear tumor cell staining.  HER2: Negative; stain score = 0.  Supplemental (3):  [place] DIAGNOSIS:  FINAL DIAGNOSIS  Breast, right, needle core biopsy ([id number]; 06/17/2014): Invasive ductal carcinoma, [place] grade III (of  III), with focal micropapillary features.  Immunohistochemical stains performed at the referring institution show that the tumor cells have the following  phenotype: - Estrogen receptor: Negative (0% tumor cells staining). - Progesterone receptor: Negative (0% tumor  cells staining). - HER2: Negative (score 0).  Lymph nodes, right, sentinel biopsy and lumpectomy ([id number]; 07/11/2014):  1. Right sentinel lymph node: A single (1) lymph node is negative for metastatic carcinoma.  Immunohistochemical stains performed by the referring institution and reviewed at [facility] for cytokeratin are  negative, confirming the diagnosis.  2. Right breast: Invasive ductal carcinoma with micropapillary features, per report 11.5 mm in greatest dimension.  Foci suspicious for lymphovascular invasion identified. Margins of resection are free of tumor.  Immunohistochemical stains  performed by the referring institution and reviewed at [facility] show that the tumor  cells are negative for p63 and show patchy positivity for CK 5/6.  Lung, right, needle core biopsy ([id number]; 02/14/2018): Squamous cell carcinoma.    Immunohistochemical stains performed by the referring institution show the tumor cells are strongly positive for p63  and CK 5/6, while negative for TTF-1 and CK7. These result support the diagnosis. Axilla, right, needle core biopsy  ([id number]; 03/11/2019): Lymph node positive for metastatic carcinoma, most consistent with breast primary  (see comment).  Immunohistochemical stains performed by the referring institution and reviewed at [facility] show that the tumor  cells are negative for p63, focally positive for CK 5/6, focally and weakly positive for JUAREZ-3, and strongly positive  for CK7. They are also negative for estrogen receptor, progesterone receptor, and HER2 JAM (score 0).  COMMENT:  Thank you for allowing me to review the case of this 73-year-old lady who recently underwent needle core biopsies  of a right axillary mass and who has a previous diagnosis of an invasive ductal carcinoma of the right breast, as well  as a squamous cell carcinoma of the lung. I am reviewing this case because of my special interest in breast  pathology.  I agree with your interpretation of this case. In my opinion, the lung tumor corresponds to a squamous cell  carcinoma and appears to be unrelated to the invasive ductal carcinoma of the breast. The axillary mass, in my  opinion, corresponds to metastases of the breast primary. Although it is a poorly differentiated carcinoma, the  immunophenotype is most consistent with the one that the breast primary exhibited, and is inconsistent with a  metastatic squamous cell carcinoma. I did share the case with Dr. Anabel Stone, one of our pulmonary pathologists,  and she concurs with this interpretation.  Note: Report attached.  Performing  location:  [facility]  [address] [place] [address]      LYMPH NODE, RIGHT AXILLA, BIOPSY: 6/16/21   - Metastatic poorly-differentiated carcinoma, consistent with breast primary  -ER, CO, and HER2 immunohistochemical hormonal markers are also negative  PD-L1 (22C3) SemiQuant IHC, Manual  Interpretation  Right axillary lymph node , specimen for PD-L1 immunohistochemistry studies (clone 22C3, [employer], [place], [place]; using a proprietary detection system) ([id number]):  Reported carcinoma site of origin: Breast  Result: The percent of PD-L1 positive cells based upon the total number of tumor cells (combined positive  score, CPS) is greater than or equal to 10.  Interpretation: Studies suggest that positive PD-L1 immunohistochemistry in tumor cells and/or tumorassociated  immune cells may predict tumor response to therapy with immune checkpoint inhibitors. This  result should not be used as the sole factor in determining treatment, as other factors (for example, tumor  mutation burden, and microsatellite instability) have been also studied as predictive markers.          CT neck/chest/abd/pelvis w/contrast 4/26/2019   The previously described right hilar mass is no longer visible.  There is persistent volume loss in the right middle lobe this appears similar to the prior PET-CT February 21, 2019.    Persistent abnormal right axillary node today measuring about 15 mm compared 18 mm prior.  The positioning of the adjacent nodes is slightly different likely accounting for the slight difference in measurement.    Cluster of tree-in-bud nodular densities left lower lobe series 2, image 93.  This may be related to small airways disease though serial follow-up suggested.      PET/CT 6/20/2019   New and worsening hypermetabolic lymph nodes concerning for metastatic breast cancer as described above.  Tissue sampling would be required for definitive  diagnosis.      2-D echo 6/27/2019   Normal left ventricular systolic function. The estimated ejection fraction is 55%  Concentric left ventricular remodeling.  Normal LV diastolic function.  Normal right ventricular systolic function.  Moderate left atrial enlargement.  Mild right atrial enlargement.  Mild aortic regurgitation.  Mild mitral regurgitation.  Mild tricuspid regurgitation.  Normal central venous pressure (3 mm Hg).  The estimated PA systolic pressure is 25 mm Hg      PET/CT 9/19/2019   Favorable response to therapy with interval decrease in size and uptake of several right axillary lymph nodes and a supraclavicular lymph node.  Mild residual activity may indicate viable tumor.    No new hypermetabolic findings worrisome for malignancy.    PET/CT 2/28/2020  1.  No evidence of hypermetabolic tumor.  Continued improvement of the right axilla status post adjuvant radiation.    2.  No new hypermetabolic lesions      CTA 6/17/2020  1. No evidence of pulmonary embolus. No aortic dissection.   2. There is no evidence for new or progressive disease in the chest as compared to PET/CT 6/20/2019 in this patient with history of right breast cancer and right lung neoplasm. The patient does have a more recent PET/CT 2/28/2020 from an outside facility per the electronic medical record although images are not available for direct comparison. Correlation with these images may be beneficial. Continued long-term surveillance recommended.  3. Stable appearance of the treated tumor in the right hilum/middle lobe.  4. Stable treated right breast cancer and axillary adenopathy without evidence for developing breast mass or new right axillary adenopathy.  5. Stable tiny nodularity/tree-in-bud densities in the left lung, probably postinfectious or inflammatory.  6. Wall thickening of the esophagus may be correlated for esophagitis. Possible tiny hiatus hernia.  7. Slight bronchial wall thickening may be correlated for  bronchitis.  8. Mild to moderate emphysema as before.  9. Reflux of contrast into the IVC and hepatic veins is nonspecific but may be correlated for elevated right heart pressures.  10. Coronary calcifications and/or stents similar to prior.    Echo 5/21/2020  Technically difficult study.   Normal left ventricular systolic function.   Left ventricular ejection fraction is estimated at 55%.   Severe mitral annular calcification.   Mild mitral valve regurgitation.   Aortic valve sclerosis without stenosis or regurgitation.     PFTs 6/17/2020  Spirometry reveals a very severe obstructive lung defect, which does not significantly improve post bronchodilators. Lung volumes revealed air trapping. Diffusing capacity was moderately impaired.        Del 6/26/2020  BI-RADS Category:   Overall: 2 - Benign      PET/CT 10/23/2020   Impression:     Stable mildly hypermetabolic right axillary lymph node/nodular density contralateral to the site of injection.  Differential considerations include metastatic lymph node, reactive lymph node from benign right upper extremity process, and fat necrosis.  Recommend physical examination of the upper extremity and consideration of ultrasound for further evaluation of the node/nodule and possible image guided biopsy.  Otherwise, attention on follow-up.     Otherwise, no other hypermetabolic disease identified      Mammo diagnostic right /US 11/4/2020   Impression:   1. No suspicious finding either on mammogram or ultrasound at the site of the palpable lump in the right breast at 10:00 o'clock. A bilateral mammogram is recommended in June 2020 to return to the patient to annual screening.   2. Redemonstration of the morphologically abnormal lymph node in the right axilla that contains a biopsy clip, compatible with the known recurrence metastatic breast cancer that has been treated with chemotherapy. The appearance of this lymph node is unchanged from 06/26/2020 and overall appears smaller  when compared to 03/11/2019.     Abd US 12/11/2020   Impression:     1. Echogenic liver likely representing hepatic steatosis.  2. Right upper quadrant ultrasound otherwise is unremarkable.     PET/CT 10/14/21     Impression:     1.  No definite evidence of hypermetabolic tumor.  Interval resolution of hypermetabolic right axillary lymph nodes.  No new hypermetabolic findings.     2.  Persistent low-grade diffuse cutaneous uptake which is nonspecific.    MRI shoulder w w/out contrast 1/26/22   Impression:     Mild edema and postcontrast enhancement at the myotendinous junction of the supraspinatus and infraspinatus, it is nonspecific and could be due to degenerative change or tendinosis.     Small area of interstitial tear at the footprint insertion of the infraspinatus tendon.     No large rotator cuff tear.     No advanced degenerative change.     No osseous lesions.     PET/CT 1/26/22  Impression:In this patient with breast cancer, there is no evidence of hypermetabolic tumor to suggest recurrent or metastatic disease.   Less extensive but, nonspecific, low-grade diffuse cutaneous uptake of the right breast.       PET/CT 4/27/22  Impression:     1.  No FDG avid disease to suggest recurrence or metastatic disease.     Unchanged right breast skin thickening with mild FDG uptake.       PET/CT 7/13/22   Impression:     In this patient with lung and breast cancer, there is new left lower lobe opacification with mild radiotracer uptake which may represent aspiration, pneumonia, or malignancy.  Tissue sampling would be required for definitive diagnosis.     Unchanged right breast skin thickening with mild radiotracer uptake.    CT chest w/contrast 8/25/22    Decreasing patchy pulmonary consolidation within the left lower lobe when compared to prior PET-CT scan dated 07/13/2022.  The overall appearance of the patchy consolidation as well as the moderate improvement when compared to the prior study suggest a benign  etiology such as left lower lobe pneumonia.     Persistent band of atelectasis/scarring within the right middle lobe, stable dating back to 04/26/2019.     Coronary artery atherosclerosis in a multi vessel distribution.     There are no measurable lesions per RECIST criteria.    PET /CT  11/21/22 shows New right lower lobe infiltrate worrisome for pneumonia or aspiration.Chronic infiltrate right middle lobe.   Resolution of the left lower lobe infiltrate.    Mammo 7/26/22  BI-RADS Category:   Overall: 2 - Benign        CT chest with contrast 11/21/22    Impression:     New right lower lobe infiltrate worrisome for pneumonia or aspiration.     Chronic infiltrate right middle lobe.     Resolution of the left lower lobe infiltrate.     Coronary artery calcifications.         PET/CT 1/11/23  Impression:     1. In this patient with lung and breast cancer, there is stable right breast skin thickening with mild radiotracer uptake.  2. Interval resolution of hypermetabolic opacification in the left lower lobe as compared to FDG PET-CT 07/14/2022.  Interval improvement in patchy opacities in the right lower lobe as compared to CT 11/21/2022    .                Electronically Signed By: Tapan Young MD 4/16/2023 23:17 CDT, [facility]  Narrative    PET/CT 4/14/23  [facility]  HISTORY:       Malignant neoplasm of female breast, unspecified estrogen receptor status, unspecified laterality, unspecified site of breast (CMS/HCC).       ICD10:  C50.919   Malignant neoplasm of female breast, unspecified estrogen receptor status, unspecified laterality, unspecified site of breast (CMS/HCC)       REFERENCE EXAMS:     7[facility]13[facility]2022 [facility] CT [facility] (no images, report only)     6/20/2019 [facility]       TECHNIQUE:     PET/CT with attenuation correction.     Dose:  13.62 mCi of FDG-18     Injection site:  left wrist     Field of view:  Skull base to thighs.     Arm position:  above head     Baseline glucose:   128 mg/dL       FINDINGS:       All SUV values are max SUV.     Head/Neck:     Physiologic uptake of radiotracer.         Thorax:     Right portacath.       Cutaneous thickening in the right breast (SUV=1.58).       Calcification of the mitral valve, similar to 6/20/2019.       Coronary artery atherosclerotic calcification.     Atelectasis/scarring along the inferior aspect of the right major fissure, similar to 6/20/2019.         Abdomen/Pelvis:     Physiologic uptake in the genitourinary system.     Physiologic uptake in the intestines.       Patchy atherosclerotic calcification in the aorta and iliac arteries.       Impression      No opacity in the left lower lobe on the current exam to correspond to a left lower lobe opacity described on the 7/13/2022 PET CT report.  Images from the 7/13/2022 PET CT are not available at the time of this report.       Cutaneous thickening in the right breast with mild uptake.  There was a similar finding described on the 71/3/2022 PET CT report.           Electronically Signed By: Tapan Young MD 4/16/2023 23:17 CDT, [facility]          Mammo screening bilateral 8/7/23  BI-RADS Category: Overall: 2 - Benign     PET /CT ( NOT DOTATATE) 4/14/23 No opacity in the left lower lobe on the current exam to correspond to a left lower lobe opacity described on the 7/13/2022 PET CT report.  Images from the 7/13/2022 PET CT are not available at the time of this report.   Cutaneous thickening in the right breast with mild uptake.  There was a similar finding described on the 71/3/2022 PET CT report.       PET/CT 11/14/23    Impression:     Similar appearing right breast skin thickening with mild hypermetabolic activity.  No new focal abnormal tracer uptake elsewhere.      EXAMINATION: 3/19/24   NM PET CT FDG SKULL BASE TO MID THIGH     CLINICAL HISTORY:  Breast cancer, invasive, stage IV, assess treatment response; Malignant neoplasm of unspecified site of unspecified  female breast     TECHNIQUE:  12.8 mCi of F18-FDG was administered intravenously in the left antecubital fossa.  After an approximately 60 min distribution time, PET/CT images were acquired from the skull base to mid thigh.  Transmission images were acquired to correct for attenuation using a whole body low-dose CT scan without IV contrast with the arms positioned above the head. Glycemia at the time of injection was 116 mg/dL.     COMPARISON:  NM PET-CT 11/14/2023, 01/11/2023     FINDINGS:  Quality of the study: Adequate.     In the head and neck, there are no hypermetabolic lesions worrisome for malignancy. There are no hypermetabolic mucosal lesions, and there are no pathologically enlarged or hypermetabolic lymph nodes.  Prominent scattered physiologic muscular activity.     In the chest, there is similar appearing mild right breast skin thickening with decreased hypermetabolic activity now measuring 2.3 (image 103), previously SUV max 3.2.     There is a 3 mm left axillary lymph node with mild uptake, SUV max 2.6 (axial image 56), favored to represent reactive etiology.  Attention on follow-up.     There are no concerning pulmonary nodules or masses, and there are no pathologically enlarged or hypermetabolic lymph nodes.  Prominent physiologic tracer activity throughout thoracic musculature.     In the abdomen and pelvis, there is physiologic tracer distribution within the abdominal organs and excretion into the genitourinary system.  Similar mildly hypermetabolic focus in the 1st portion of the duodenum without CT correlate measuring SUV max 5.5 (image 134), possibly physiologic although nonspecific.     In the bones, there are no hypermetabolic lesions worrisome for malignancy.  Prominent focus of hypermetabolic uptake in the left piriformis without underlying CT correlate (image 220), presumably physiologic.     Additional findings: Right chest wall implanted tunnel central venous catheter tip terminating in  the superior vena cava.  Stable punctate pulmonary micronodule in the left upper lobe (axial series 3, image 73).  Coronary arterial and valvular calcific plaque.  Advanced aortoiliac calcific plaque.     Impression:     Similar appearing mild right breast skin thickening with decreased hypermetabolic activity. No new abnormal tracer uptake elsewhere worrisome for malignancy.     Additional findings as above.           Assessment:       1. Recurrent breast cancer, unspecified laterality    2. History of lung cancer    3. Chronic obstructive pulmonary disease, unspecified COPD type    4. Hypomagnesemia    5. Hypokalemia    6. Dysphagia, unspecified type                  Plan:     1.,2.   Pt with hx of Stage 1A invasive ductal carcinoma rt breast s/p rt segmental mastectomy and SLN bx 7/11/2014 ER/PA neg HER 2 jose maria neg [id number](i-).  S/p adjuvant RT completed 9/2014 Pt declined adjuvant chemo  Pt  hospitalized 11/2017 with acute-on-chronic  resp failure  Abnormal CT imaging revealing Large right hilar mass with involvement of  adjacent segmental pulmonary arterial branches and bronchi with associated postobstructive atelectasis  and involvement of mediastinal and axillary adenopathy   S/p rt axillary LN bx ( outside facility) - metastatic poorly differentiated carcinoma of unknown primary  site  status post  lung biopsy 1/31/2017 -benign lung tissue  PET/CT 1/26/2018   Intense FDG uptake within the known right infrahilar lung mass, compatible with malignancy.   Intensely hypermetabolic right axillary, retropectoral, and lower right cervical lymph nodes, compatible with metastases.  Abnormal FDG uptake along right breast skin thickening, new from prior chest CTA and mammogram.  PET/CT 11/14/23 Similar appearing right breast skin thickening with mild hypermetabolic activity.  No new focal abnormal tracer uptake elsewhere.  Repeat lung bx 2/14/2018 revealed Squamous Cell CA lung PD-L1 10% EGFR NEGALK NEG  Pt treated for  advanced squamous cell CA of lung She s/p  cycle 6 of carboplatin/Abraxane Day1 completed 7/2/2018 ( day 15 held due to prolonged cytopenias)  She completed therapy with near CR     PET/CT 2/21/2019 showed increased size and irregularity of the right axillary lymph node with increased FDG avidity     MAMMO/US rt breast 3/2019  reveals suspicious findings rt axilla LN.  Biopsy of the lymph node measuring 1.4 x 1.1 x 1.3     Pt s/p  rt axillary LN bxPositive for poorly differentiated carcinoma.- likely breast primary ERneg PRneg Her 2 neg    Outside review of specimen(s) revealed  lung tumor corresponds to a squamous cell carcinoma and appears to be unrelated to the invasive ductal carcinoma of the breast. It was determined The axillary mass, in my opinion, corresponded  to metastases of the breast primary. Although it is a poorly differentiated carcinoma, theimmunophenotype is most consistent with the one that the breast primary exhibited, and is inconsistent with a metastatic squamous cell carcinoma.     CT imaging 4/26/2019 persistent rt axillary LAD, no other sites of disease     Lymph node biopsy 3/11/2019 Right axilla, mass, core biopsy:Positive for poorly differentiated carcinoma.favor breast primary     PET/CT 6/20/2019  New and worsening hypermetabolic lymph nodes concerning for metastatic breast cancer     Previously Discussed  imaging findings in detail with patient which reveals new and worsening hypermetabolic melena metabolic lymph nodes including hypermetabolic supraclavicular node.  Therefore, at treatment option will be systemic chemotherapy in light of the recent imaging findings.  She will be followed by her surgical oncologist Dr. Christopher      IT was determined to proceed with systemic chemotherapy   S/p  AC [id number] x 3 wks x 4 wks completed 9/6/2019   ( pt has not received in past)      PET/CT 9/19/2019 shows disease response with interval decrease in size and uptake of several right axillary lymph  nodes and a supraclavicular lymph node.  Mild residual activity may indicate viable tumor.No new hypermetabolic findings worrisome for malignancy.    Pt followed by  Breast Surgery and plan was  for possible   ALND. Pt with Recurrent cancer in her right axilla and right supraclavicular fossa.   She completed chemotherapy 9/6/2019 with near CR  It was determined  Radiation will be given to the original areas of hypermetabolic activity in the right axilla and right supraclavicular region    Pt completed  RT 12/16/2019     PET/CT 1/14/21 shows   New right axillary hypermetabolic lymph nodes with preserved normal architecture suggestive of reactive nodes.  Stable appearing previously identified hypermetabolic lymph node with mild increase in surrounding fat stranding.        Findings previously d/w with treating breast surgeon and it was determined to cont to monitor and close f/u as pt is heavily pre treated, has received RT to site   Pt acknowledged understanding and agreeable with plan     PET/CT imaging  5/20/21 shows Continued increase in both size and hypermetabolic activity of right axillary level 1 lymph nodes a new, mildly hypermetabolic cutaneous thickening of the right breast.     Right breast, skin, punch biopsy:Negative for carcinoma    LYMPH NODE, RIGHT AXILLA, BIOPSY: 6/16/21   - Metastatic poorly-differentiated carcinoma, consistent with breast primary triple neg  PD-L1 immunohistochemistry studies(combined positive score, CPS) is greater than or equal to 10   PET/CT showed  No definite evidence of hypermetabolic tumor.  Interval resolution of hypermetabolic right axillary lymph nodes.  No new hypermetabolic findings.      S/p C16 pembrolizumab 6/15/22   Last  PET/CT imaging shows  new left lower lobe opacification with mild radiotracer uptake which may represent aspiration, pneumonia, or malignancy.  Recent follow-up imaging studies decreasing patchy pulmonary consolidation within the left lower lobe  when compared to prior PET-CT scan dated 07/13/2022.  Plan to remain off of therapy   Follow-up PET/CT imaging 1/11/23 Interval resolution of hypermetabolic opacification in the left lower lobe as compared to FDG PET-CT 07/14/2022.  Interval improvement in patchy opacities in the right lower lobe as compared to CT 11/21/2022   follow up PET imaging [id number] -  performed at [facility] .([facility] was not available at  since broken )No opacity in the left lower lobe on the current exam to correspond to a left lower lobe opacity described on the 7/13/2022 PET CT report.  Images from the 7/13/2022 PET CT are not available at the time of this report.       Follow up PET imaging 3/19/24 shows Similar appearing mild right breast skin thickening with decreased hypermetabolic activity. No new abnormal tracer uptake elsewhere worrisome for malignancy.    Cont to monitor     3.  F/b Pulmonology  Pt on supp 02 at night   Cont MDI and O2 as prescribed     4. K supp x 3 days ( pt has at home)     Check Mag level             Subjective:       Patient ID: Ade Castellaon is a 78 y.o. female.    Chief Complaint: Results         Diagnosis:   History of Stage 1A  pT1c  pN0 cM0 Rt breast cancer Grade 3, ER/WA neg , Her 2 jose maria neg s/p lumpectomy 7/11/2014 and s/p adjuvant RT 9/2014   She completed post operative radiation therapy at 400 cGy per fraction to 4000 cGy 9/2014    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 2/14/2018 PD-L1 10% lowEGFR NEG ALK NEG BRAF NEG  s/p  cycle 6 of carboplatin/Abraxane 7/2/2018   Recurrent breast cancer diagnosed 3/2019  She is s/p AC q21d x 4 cycles completed 9/6/2019   She  completed  RT 12/16/2019 The right axilla and right supraclavicular region was treated at 200 cGy per fraction to 4400 cGy. The PET(+) disease in the right axilla and right supraclavicular fossa will be boosted at 200 cGy per fraction to 6000 cGy total dose.  S/p LYMPH NODE, RIGHT AXILLA, BIOPSY: 6/16/21 . Metastatic  poorly-differentiated carcinoma, c/w breast primary ERnegPRneg Her2 neg  PD-L1 (22C3) IHC CPS> is greater than or equal to 10  Pembrolizumab 7/22/21 -6/15/22         Prior Hx:  79 y/o female with history of  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  And Rt  breast Haja/p  cycle 6 of carboplatin/Abraxane 7/2/2018   She has  History of Stage 1A  Rt breast cancer Grade 3, ER/HI neg , Her 2 jose maria neg s/p lumpectomy 7/11/2014 and s/p adjuvant RT 9/2014 Pt declined Adjuvant chemo.Pathology showed a 1.15 cm, grade 3 infiltrating ductal carcinoma.  One sentinel lymph node was negative for malignancy.  Margins were negative and the closest margin was 1 cm.  She was staged  pT1c  pN0 cM0 stage IA.  She declined adjuvant chemo therapy.  She completed post operative radiation therapy at 400 cGy per fraction to 4000 cGy 9/2014  Pt hospitalized 11/2017 for  acute on chronic respiratory failure requiring intubation and ventilation. Has large lung mass in right lung with probable post obstructive pneumonia resulting in COPD exacerbation.CTA chest 11/29/2017 revealed   Large right hilar mass with involvement of adjacent segmental pulmonary arterial branches and bronchi with associated postobstructive atelectasis and volume loss in the RML  with adjacent ground glass opacity concerning for underlying neoplastic process. Mediastinal and axillary adenopathy, with a right axillary lymph node measuring up to 2.0 cm in short axis diameter.She underwent rt axillary LN bx at outside facility- on 1/16/2018 at [facility] [place]. Pathology revealed metastatic poorly differentiated carcinoma of unknown primary site. She next underwent lung bx at [facility] [place] 1/31/2018  benign lung tissue. Repeat Right Lung Bx 2/14/2018 Pathology reveals Squamous cell carcinoma PD-L1 10% low expression EGFR NEG ALK NEG BRAF NEG. Outside slides axillary LN specimen were reviewed/comparison to lung bx findings . She completed    cycle 6 of carboplatin/Abraxane completed 7/2018 .PET/CT   4/19/2018 revealed there has been a excellent response to therapy.  At least 90% reduction in malignant activity.PET/CT 2/21/2019 increased size and irregularity of the right axillary lymph node with increased FDG avidityMAMMO/US rt breast 2/2019 revealed suspicious findings rt axilla LN.  Right axilla, mass, core biopsy: Positive for poorly differentiated carcinoma breast primary ER neg/SC neg Her2 neg.[facility]ides [facility] for outside reviewIt was determined  the lung tumor corresponds to a squamous cellcarcinoma and appears to be unrelated to the invasive ductal carcinoma of the breast. The axillary mass, in myopinion, corresponds to metastases of the breast primary. Although it is a poorly differentiated carcinoma, theimmunophenotype is most consistent with the one that the breast primary exhibited, and is inconsistent with metastatic squamous cell carcinoma. She was treated with  AC ( adriamycin 60m/m2/Cytoxan 600mg/m2)  q21d x 4 cycles completed 9/6/2019 . PET/CT 9/19/2019 shows disease response l decrease in size and uptake of several right axillary lymph nodes and a supraclavicular lymph node.  Mild residual activity may indicate viable tumor.Pt followed by Rad/Onc. [facility] and it was determined to proceed Radiation therapy only. No ALND due to concurrent lung and supraclavicular node. She  completed  RT 12/16/2019  To right axilla and right supraclavicular region PET/CT imaging  5/20/21 shows Continued increase in both size and hypermetabolic activity of right axillary level 1 lymph nodes a new, mildly hypermetabolic cutaneous thickening of the right breast. she underwent LYMPH NODE, RIGHT AXILLA, BIOPSY: 6/16/21 . Pathology showed Metastatic poorly-differentiated carcinoma, consistent with breast primary-ERneg/ PRneg  HER2  neg  Pt started on pembrolizumab 7/2021  Pt hospitalized 4/6/22 with hypokalemia and orthostatic hypotension  S/p C16 pembrolizumab  6/15/22  ( 400mg q6wks)      Interval    She reports   cough improved with treatment  Treated last visit for COPD exacerbation  She was prescribed course of steroids and antitussives  She continues with mild LOGAN-stable  She is f/b pulmonology, Dr. Katz  She reports arthralglas involving rt shoulder past few weeks  She also has LBP past few weeks  No numbness/tingling in extremities  Dysphagia improved s/p  dilation   No fevers  Appetite and weight stable  She has supp 02 at home       PET /CT ( NOT DOTATATE) 4/14/23 No opacity in the left lower lobe on the current exam to correspond to a left lower lobe opacity described on the 7/13/2022 PET CT report.  Images from the 7/13/2022 PET CT are not available at the time of this report.   Cutaneous thickening in the right breast with mild uptake.  There was a similar finding described on the 71/3/2022 PET CT report.                PrevHx:She had an abnormal mammogram 6/4/2014 whichRevealed a round solid mass 6mm in rt breast.She then underwent U/S guided core bx of rt breast mass on 6/17/2014.Pathology revealed infiltrating ductal carcinoma Grade 3 with tumorPresent in thin-walled spaces suggestive of lymphatic spaces. HormoneReceptor status on tumor specimen revealed ER negative 0% ,ME negative 0% and Her 2 jose maria negative.She subsequently underwent rt segmental mastectomy and SLN bxOn 7/11/2014. Pathology of rt breast lumpectomy revealed invasiveDuctal carcinoma with micropapillary pattern ( Invasive micropapillaryCa) with max tumor dimension 11.5mm with suggestion of tumor in Thin walled spaces c/w lymphovascular involvement. SurgicalMargins free of tumor, grade 3, ER/ME neg , Her 2 jose maria neg With [place] Lymph node negative for Neoplasm. Pathologic staging [id number](i-)Her mother was diagnosed with breast cancer in her 50's/        Review of Systems   Constitutional: Positive for mild  fatigue. No  activity change,  fever.   HENT: Negative for mouth sores,  "rhinorrhea,dysphagia   Eyes: Negative for visual disturbance.   Respiratory: Positive for chronic  LOGAN,stable, Cough improved. Negative for wheezing.    Cardiovascular: Negative for chest pain.   Gastrointestinal:  Negative for abdominal pain and diarrhea.   Genitourinary: Negative for frequency.   Musculoskeletal: Positive for LBP  Skin: Negative for rash.   Neurological: Negative for dizziness and headaches.   Hematological: Negative for adenopathy.   Psychiatric/Behavioral: The patient is not nervous/anxious.        Objective:        Vitals:    04/15/24 1018   BP: 100/66   Pulse: (!) 53   Resp: 18   SpO2: 99%   Weight: 73.5 kg (162 lb 0.6 oz)   Height: 5' 2" (1.575 m)             Physical Exam   Constitutional: She is oriented to person, place, and time. She appears ill, coughing episodes  HENT:   Head: Normocephalic.   Eyes: Conjunctivae and lids are normal.No scleral icterus.   Neck: Normal range of motion. Neck supple. No thyromegaly present.   Cardiovascular: Normal rate, regular rhythm and normal heart sounds.   murmur heard.  Pulmonary/Chest: Breath sounds normal. She has no wheezes. She has no rales.   Abdominal: Soft. Bowel sounds are normal.  There is no tenderness. There is no rebound and no guarding.   Left breast- no masses or axillary LAD noted  Right breast- breast thickening inner quad    She has no cervical adenopathy.     She has rt axillary adenopathy.        Right: No supraclavicular adenopathy present.        Left: No supraclavicular adenopathy present.   Neurological: She is alert and oriented to person, place, and time. No cranial nerve deficit. Coordination normal.   Skin: Skin is warm and dry. No ecchymosis, no petechiae and no rash noted. No erythema.   Psychiatric: She has a normal mood and affect.             CT a/p w/contrast 11/29/2018   1. No acute abnormality identified within the abdomen and pelvis.    2.  There are a few nonspecific prominent lymph nodes in the upper abdomen " including a periesophageal lymph node which measures 1.0 cm in short axis diameter.    3.  Significant abdominal aortic atherosclerosis and abdominal aorta ectasia.    4.  Additional findings as above.    PET/CT 1/26/2018   1.  Intense FDG uptake within the known right infrahilar lung mass, compatible with malignancy.  It is unclear if this represents primary lung malignancy or metastatic breast cancer.    2.  Intensely hypermetabolic right axillary, retropectoral, and lower right cervical lymph nodes, compatible with metastases.    3.  Abnormal FDG uptake along right breast skin thickening, new from prior chest CTA and mammogram.  This may relate to localized edema/inflammation, though correlation with physical exam and mammography are recommended to exclude underlying inflammatory carcinoma.      MRI Brain w w/out contrast 2/9/2018  1.  No evidence of intracranial metastases.  2.  Sinus disease       Rt axillary LN bx at outside facility- on 1/16/2018 at [facility]  Pathology revealed metastatic poorly differentiated carcinoma of unknown primary  site 1/16/2018 at [facility]  TTF-1 negative, napsin negative  , cytokeratin 7 positive, cytokeratin 20 negative, p63 negative, cytokeratin 5/6 focal dim positivity    LUNG BIOPSY 1/31/2018  Pathology revealed benign lung tissue         SPECIMEN  1) Right Lung Bx 2/14/2018  Supplemental Diagnosis  Immunohistochemical stains show strong nuclear staining for p63 in essentially all tumor cells and very strong  cytoplasmic and membrane staining for CK5/6, also in essentially all tumor cells. TTF-1 and CK7 are negative  within tumor cells but do stain native pulmonary elements present within the biopsy. A stain for mucicarmine is  negative. Positive and negative controls function appropriately.  Final diagnosis: Specimen submitted as right lung biopsy  -Squamous cell carcinoma  (Electronically Signed: 2018-02-20 09:12:07 )  Diagnosed by: Phong  Jay  FINAL PATHOLOGIC DIAGNOSIS  Fragments of pulmonary parenchyma (submitted as right lung biopsy):    FINAL PATHOLOGIC DIAGNOSIS 1. Lymph node, right axilla, biopsy, review of 10 outside slides [facility], JS [phone],  collected January 11, 2018: Metastatic poorly differentiated carcinoma (see comment).  The histologic section shows fibrous stroma infiltrated by nest of poorly differentiated malignant cells including  occasional dyskeratotic cells morphologically suggestive of metastatic squamous cell carcinoma.  Immunohistochemical stains are nonspecific; the cells are positive for cytokeratin 7 and cytokeratin 5/6 (focal) and  negative for cytokeratin 20, TTF-1, p63, GCDFP, mammoglobin, and CEA        PET/CT 2/21/2019  Increased size and irregularity of the right axillary lymph node with increased FDG avidity.  Although this would be atypical in location for lung carcinoma, the increased size and avidity must be concerning for metastatic disease in this patient with history of squamous cell lung cancer.     US Rt breast and mammo 2/26/2019  Impression:  Right  Lymph Node: Right axilla lymph node. Assessment: 4 - Suspicious finding. Biopsy is recommended.      BI-RADS Category:   Right: 4 - Suspicious  Overall: 4 - Suspicious     Recommendation:  Biopsy is recommended. Biopsy of the lymph node measuring 1.4 x 1.1 x 1.3 recommended , the one with the round shape configuration, corresponding to the most recent PET CT finding.         Pathology 3/11/2019   FINAL PATHOLOGIC DIAGNOSIS  Right axilla, mass, core biopsy:  Positive for poorly differentiated carcinoma.  See comment.  Comment:  The biopsy from the right axilla mass shows a poorly differentiated carcinoma in background lymphoid tissue  with enlarged cells showing increased nuclear size, prominent nucleoli, moderate amounts of cytoplasm and mitotic  figures. Immunostains are completed and reveal the tumor cells to stain positively with  cytokeratin AE 1/AE3, CK  5/6 and CK 7. The tumor cells are negative for CK 20, Estrogen receptor, p63 and TTF-1. All stains have  satisfactory positive and negative controls. The patient's prior cases will be reviewed and an additional stain for  JUAREZ-3 is pending in an attempt to pinpoint the primary site of this malignancy and results will follow in a  supplemental report.      Supplemental Diagnosis  3/11/2019  The current axillary mass is compared to the patient's prior right lung biopsy (case number [id number]) and the  current axillary mass is morphologically similar to the lung malignancy. Also, the current axillary mass is compared  to the patient's prior breast resection (Case number [id number]) and appears similar as well. P63 is negative in the  [id number] tumor and CK 5/6 shows scattered positive staining in the [id number] tumor.  Immunostain for JUAREZ-3 is completed and shows only rare weak to moderate staining within the tumor cell nuclei  with satisfactory positive and negative controls. This stain is positive in the surrounding lymphocytes. This staining  pattern is non-specific and does not definitively differentiate between a lung and breast primary malignancy in this  right axilla mass biopsy. The staining profile of the current axillary mass match more closely with the previous  breast carcinoma (due to the p63 negativity in both the 2014 breast cancer and the current axillary mass lesion but  p63 positivity in the lung mass from 2018).  This staining profile, together with the comparison with the patient's prior breast tumor and prior lung tumor supports  a diagnosis of poorly differentiated carcinoma and the malignancy appears morphologically similar to the prior  malignancies from both sites, but the staining profile in this small sample from the axillary mass more closely  correlates with the prior breast malignancy. Pathologic subclassification of this malignancy's primary location is  not  definitive and clinical correlation is recommended definitively decide on possible primary location in this patient's  axillary mass sample.  Supplemental (2):  Additional immunohistochemical staining for progesterone receptor and HER2 are completed per clinician request  with following results:  Progesterone receptor: Negative; 0% nuclear tumor cell staining.  HER2: Negative; stain score = 0.  Supplemental (3):  [place] DIAGNOSIS:  FINAL DIAGNOSIS  Breast, right, needle core biopsy ([id number]; 06/17/2014): Invasive ductal carcinoma, [place] grade III (of  III), with focal micropapillary features.  Immunohistochemical stains performed at the referring institution show that the tumor cells have the following  phenotype: - Estrogen receptor: Negative (0% tumor cells staining). - Progesterone receptor: Negative (0% tumor  cells staining). - HER2: Negative (score 0).  Lymph nodes, right, sentinel biopsy and lumpectomy ([id number]; 07/11/2014):  1. Right sentinel lymph node: A single (1) lymph node is negative for metastatic carcinoma.  Immunohistochemical stains performed by the referring institution and reviewed at [facility] for cytokeratin are  negative, confirming the diagnosis.  2. Right breast: Invasive ductal carcinoma with micropapillary features, per report 11.5 mm in greatest dimension.  Foci suspicious for lymphovascular invasion identified. Margins of resection are free of tumor.  Immunohistochemical stains performed by the referring institution and reviewed at [facility] show that the tumor  cells are negative for p63 and show patchy positivity for CK 5/6.  Lung, right, needle core biopsy ([id number]; 02/14/2018): Squamous cell carcinoma.    Immunohistochemical stains performed by the referring institution show the tumor cells are strongly positive for p63  and CK 5/6, while negative for TTF-1 and CK7. These result support the diagnosis. Axilla, right, needle core biopsy  ([id number]; 03/11/2019):  Lymph node positive for metastatic carcinoma, most consistent with breast primary  (see comment).  Immunohistochemical stains performed by the referring institution and reviewed at [facility] show that the tumor  cells are negative for p63, focally positive for CK 5/6, focally and weakly positive for JUAREZ-3, and strongly positive  for CK7. They are also negative for estrogen receptor, progesterone receptor, and HER2 JAM (score 0).  COMMENT:  Thank you for allowing me to review the case of this 73-year-old lady who recently underwent needle core biopsies  of a right axillary mass and who has a previous diagnosis of an invasive ductal carcinoma of the right breast, as well  as a squamous cell carcinoma of the lung. I am reviewing this case because of my special interest in breast  pathology.  I agree with your interpretation of this case. In my opinion, the lung tumor corresponds to a squamous cell  carcinoma and appears to be unrelated to the invasive ductal carcinoma of the breast. The axillary mass, in my  opinion, corresponds to metastases of the breast primary. Although it is a poorly differentiated carcinoma, the  immunophenotype is most consistent with the one that the breast primary exhibited, and is inconsistent with a  metastatic squamous cell carcinoma. I did share the case with Dr. Anabel Stone, one of our pulmonary pathologists,  and she concurs with this interpretation.  Note: Report attached.  Performing location:  [address] [address]      LYMPH NODE, RIGHT AXILLA, BIOPSY: 6/16/21   - Metastatic poorly-differentiated carcinoma, consistent with breast primary  -ER, NE, and HER2 immunohistochemical hormonal markers are also negative  PD-L1 (22C3) SemiQuant IHC, Manual  Interpretation  Right axillary lymph node , specimen for PD-L1 immunohistochemistry studies (clone 22C3, [employer], [place], [place]; using a proprietary detection  system) ([id number]):  Reported carcinoma site of origin: Breast  Result: The percent of PD-L1 positive cells based upon the total number of tumor cells (combined positive  score, CPS) is greater than or equal to 10.  Interpretation: Studies suggest that positive PD-L1 immunohistochemistry in tumor cells and/or tumorassociated  immune cells may predict tumor response to therapy with immune checkpoint inhibitors. This  result should not be used as the sole factor in determining treatment, as other factors (for example, tumor  mutation burden, and microsatellite instability) have been also studied as predictive markers.          CT neck/chest/abd/pelvis w/contrast 4/26/2019   The previously described right hilar mass is no longer visible.  There is persistent volume loss in the right middle lobe this appears similar to the prior PET-CT February 21, 2019.    Persistent abnormal right axillary node today measuring about 15 mm compared 18 mm prior.  The positioning of the adjacent nodes is slightly different likely accounting for the slight difference in measurement.    Cluster of tree-in-bud nodular densities left lower lobe series 2, image 93.  This may be related to small airways disease though serial follow-up suggested.      PET/CT 6/20/2019   New and worsening hypermetabolic lymph nodes concerning for metastatic breast cancer as described above.  Tissue sampling would be required for definitive diagnosis.      2-D echo 6/27/2019   Normal left ventricular systolic function. The estimated ejection fraction is 55%  Concentric left ventricular remodeling.  Normal LV diastolic function.  Normal right ventricular systolic function.  Moderate left atrial enlargement.  Mild right atrial enlargement.  Mild aortic regurgitation.  Mild mitral regurgitation.  Mild tricuspid regurgitation.  Normal central venous pressure (3 mm Hg).  The estimated PA systolic pressure is 25 mm Hg      PET/CT 9/19/2019   Favorable response to  therapy with interval decrease in size and uptake of several right axillary lymph nodes and a supraclavicular lymph node.  Mild residual activity may indicate viable tumor.    No new hypermetabolic findings worrisome for malignancy.    PET/CT 2/28/2020  1.  No evidence of hypermetabolic tumor.  Continued improvement of the right axilla status post adjuvant radiation.    2.  No new hypermetabolic lesions      CTA 6/17/2020  1. No evidence of pulmonary embolus. No aortic dissection.   2. There is no evidence for new or progressive disease in the chest as compared to PET/CT 6/20/2019 in this patient with history of right breast cancer and right lung neoplasm. The patient does have a more recent PET/CT 2/28/2020 from an outside facility per the electronic medical record although images are not available for direct comparison. Correlation with these images may be beneficial. Continued long-term surveillance recommended.  3. Stable appearance of the treated tumor in the right hilum/middle lobe.  4. Stable treated right breast cancer and axillary adenopathy without evidence for developing breast mass or new right axillary adenopathy.  5. Stable tiny nodularity/tree-in-bud densities in the left lung, probably postinfectious or inflammatory.  6. Wall thickening of the esophagus may be correlated for esophagitis. Possible tiny hiatus hernia.  7. Slight bronchial wall thickening may be correlated for bronchitis.  8. Mild to moderate emphysema as before.  9. Reflux of contrast into the IVC and hepatic veins is nonspecific but may be correlated for elevated right heart pressures.  10. Coronary calcifications and/or stents similar to prior.    Echo 5/21/2020  Technically difficult study.   Normal left ventricular systolic function.   Left ventricular ejection fraction is estimated at 55%.   Severe mitral annular calcification.   Mild mitral valve regurgitation.   Aortic valve sclerosis without stenosis or regurgitation.     PFTs  6/17/2020  Spirometry reveals a very severe obstructive lung defect, which does not significantly improve post bronchodilators. Lung volumes revealed air trapping. Diffusing capacity was moderately impaired.        Del 6/26/2020  BI-RADS Category:   Overall: 2 - Benign      PET/CT 10/23/2020   Impression:     Stable mildly hypermetabolic right axillary lymph node/nodular density contralateral to the site of injection.  Differential considerations include metastatic lymph node, reactive lymph node from benign right upper extremity process, and fat necrosis.  Recommend physical examination of the upper extremity and consideration of ultrasound for further evaluation of the node/nodule and possible image guided biopsy.  Otherwise, attention on follow-up.     Otherwise, no other hypermetabolic disease identified      Mammo diagnostic right /US 11/4/2020   Impression:   1. No suspicious finding either on mammogram or ultrasound at the site of the palpable lump in the right breast at 10:00 o'clock. A bilateral mammogram is recommended in June 2020 to return to the patient to annual screening.   2. Redemonstration of the morphologically abnormal lymph node in the right axilla that contains a biopsy clip, compatible with the known recurrence metastatic breast cancer that has been treated with chemotherapy. The appearance of this lymph node is unchanged from 06/26/2020 and overall appears smaller when compared to 03/11/2019.     Abd US 12/11/2020   Impression:     1. Echogenic liver likely representing hepatic steatosis.  2. Right upper quadrant ultrasound otherwise is unremarkable.     PET/CT 10/14/21     Impression:     1.  No definite evidence of hypermetabolic tumor.  Interval resolution of hypermetabolic right axillary lymph nodes.  No new hypermetabolic findings.     2.  Persistent low-grade diffuse cutaneous uptake which is nonspecific.    MRI shoulder w w/out contrast 1/26/22   Impression:     Mild edema and  postcontrast enhancement at the myotendinous junction of the supraspinatus and infraspinatus, it is nonspecific and could be due to degenerative change or tendinosis.     Small area of interstitial tear at the footprint insertion of the infraspinatus tendon.     No large rotator cuff tear.     No advanced degenerative change.     No osseous lesions.     PET/CT 1/26/22  Impression:In this patient with breast cancer, there is no evidence of hypermetabolic tumor to suggest recurrent or metastatic disease.   Less extensive but, nonspecific, low-grade diffuse cutaneous uptake of the right breast.       PET/CT 4/27/22  Impression:     1.  No FDG avid disease to suggest recurrence or metastatic disease.     Unchanged right breast skin thickening with mild FDG uptake.       PET/CT 7/13/22   Impression:     In this patient with lung and breast cancer, there is new left lower lobe opacification with mild radiotracer uptake which may represent aspiration, pneumonia, or malignancy.  Tissue sampling would be required for definitive diagnosis.     Unchanged right breast skin thickening with mild radiotracer uptake.    CT chest w/contrast 8/25/22    Decreasing patchy pulmonary consolidation within the left lower lobe when compared to prior PET-CT scan dated 07/13/2022.  The overall appearance of the patchy consolidation as well as the moderate improvement when compared to the prior study suggest a benign etiology such as left lower lobe pneumonia.     Persistent band of atelectasis/scarring within the right middle lobe, stable dating back to 04/26/2019.     Coronary artery atherosclerosis in a multi vessel distribution.     There are no measurable lesions per RECIST criteria.    PET /CT  11/21/22 shows New right lower lobe infiltrate worrisome for pneumonia or aspiration.Chronic infiltrate right middle lobe.   Resolution of the left lower lobe infiltrate.    Mammo 7/26/22  BI-RADS Category:   Overall: 2 - Benign        CT chest  with contrast 11/21/22    Impression:     New right lower lobe infiltrate worrisome for pneumonia or aspiration.     Chronic infiltrate right middle lobe.     Resolution of the left lower lobe infiltrate.     Coronary artery calcifications.         PET/CT 1/11/23  Impression:     1. In this patient with lung and breast cancer, there is stable right breast skin thickening with mild radiotracer uptake.  2. Interval resolution of hypermetabolic opacification in the left lower lobe as compared to FDG PET-CT 07/14/2022.  Interval improvement in patchy opacities in the right lower lobe as compared to CT 11/21/2022    .                Electronically Signed By: Tapan Young MD 4/16/2023 23:17 CDT, [facility]  Narrative    PET/CT 4/14/23  [facility]  HISTORY:       Malignant neoplasm of female breast, unspecified estrogen receptor status, unspecified laterality, unspecified site of breast (CMS/HCC).       ICD10:  C50.919   Malignant neoplasm of female breast, unspecified estrogen receptor status, unspecified laterality, unspecified site of breast (CMS/HCC)       REFERENCE EXAMS:     7/13/2022 PET CT [facility] (no images, report only)     6/20/2019 PET CT ([facility])       TECHNIQUE:     PET/CT with attenuation correction.     Dose:  13.62 mCi of FDG-18     Injection site:  left wrist     Field of view:  Skull base to thighs.     Arm position:  above head     Baseline glucose:  128 mg/dL       FINDINGS:       All SUV values are max SUV.     Head/Neck:     Physiologic uptake of radiotracer.         Thorax:     Right portacath.       Cutaneous thickening in the right breast (SUV=1.58).       Calcification of the mitral valve, similar to 6/20/2019.       Coronary artery atherosclerotic calcification.     Atelectasis/scarring along the inferior aspect of the right major fissure, similar to 6/20/2019.         Abdomen/Pelvis:     Physiologic uptake in the genitourinary system.     Physiologic uptake in the  intestines.       Patchy atherosclerotic calcification in the aorta and iliac arteries.       Impression      No opacity in the left lower lobe on the current exam to correspond to a left lower lobe opacity described on the 7/13/2022 PET CT report.  Images from the 7/13/2022 PET CT are not available at the time of this report.       Cutaneous thickening in the right breast with mild uptake.  There was a similar finding described on the 71/3/2022 PET CT report.           Electronically Signed By: Tapan Young MD 4/16/2023 23:17 CDT, [facility]          Mammo screening bilateral 8/7/23  BI-RADS Category: Overall: 2 - Benign     PET /CT ( NOT DOTATATE) 4/14/23 No opacity in the left lower lobe on the current exam to correspond to a left lower lobe opacity described on the 7/13/2022 PET CT report.  Images from the 7/13/2022 PET CT are not available at the time of this report.   Cutaneous thickening in the right breast with mild uptake.  There was a similar finding described on the 71/3/2022 PET CT report.       PET/CT 11/14/23    Impression: Similar appearing right breast skin thickening with mild hypermetabolic activity.  No new focal abnormal tracer uptake elsewhere.      PET/CT  3/19/24   Impression:     Similar appearing mild right breast skin thickening with decreased hypermetabolic activity. No new abnormal tracer uptake elsewhere worrisome for malignancy.     Assessment:       1. Recurrent breast cancer, unspecified laterality    2. History of lung cancer    3. Chronic obstructive pulmonary disease, unspecified COPD type    4. Hypomagnesemia    5. Hypokalemia    6. Dysphagia, unspecified type                Plan:     1.,2.   Pt with hx of Stage 1A invasive ductal carcinoma rt breast s/p rt segmental mastectomy and SLN bx 7/11/2014 ER/OR neg HER 2 jose maria neg nM2rrRS(i-).  S/p adjuvant RT completed 9/2014 Pt declined adjuvant chemo  Pt  hospitalized 11/2017 with acute-on-chronic  resp  failure  Abnormal CT imaging revealing Large right hilar mass with involvement of  adjacent segmental pulmonary arterial branches and bronchi with associated postobstructive atelectasis  and involvement of mediastinal and axillary adenopathy   S/p rt axillary LN bx ( outside facility) - metastatic poorly differentiated carcinoma of unknown primary  site  status post  lung biopsy 1/31/2017 -benign lung tissue  PET/CT 1/26/2018   Intense FDG uptake within the known right infrahilar lung mass, compatible with malignancy.   Intensely hypermetabolic right axillary, retropectoral, and lower right cervical lymph nodes, compatible with metastases.  Abnormal FDG uptake along right breast skin thickening, new from prior chest CTA and mammogram.  PET/CT 11/14/23 Similar appearing right breast skin thickening with mild hypermetabolic activity.  No new focal abnormal tracer uptake elsewhere.  Repeat lung bx 2/14/2018 revealed Squamous Cell CA lung PD-L1 10% EGFR NEGALK NEG  Pt treated for advanced squamous cell CA of lung She s/p  cycle 6 of carboplatin/Abraxane Day1 completed 7/2/2018 ( day 15 held due to prolonged cytopenias)  She completed therapy with near CR     PET/CT 2/21/2019 showed increased size and irregularity of the right axillary lymph node with increased FDG avidity     MAMMO/US rt breast 3/2019  reveals suspicious findings rt axilla LN.  Biopsy of the lymph node measuring 1.4 x 1.1 x 1.3     Pt s/p  rt axillary LN bxPositive for poorly differentiated carcinoma.- likely breast primary ERneg PRneg Her 2 neg    Outside review of specimen(s) revealed  lung tumor corresponds to a squamous cell carcinoma and appears to be unrelated to the invasive ductal carcinoma of the breast. It was determined The axillary mass, in my opinion, corresponded  to metastases of the breast primary. Although it is a poorly differentiated carcinoma, theimmunophenotype is most consistent with the one that the breast primary exhibited, and  is inconsistent with a metastatic squamous cell carcinoma.     CT imaging 4/26/2019 persistent rt axillary LAD, no other sites of disease     Lymph node biopsy 3/11/2019 Right axilla, mass, core biopsy:Positive for poorly differentiated carcinoma.favor breast primary     PET/CT 6/20/2019  New and worsening hypermetabolic lymph nodes concerning for metastatic breast cancer     Previously Discussed  imaging findings in detail with patient which reveals new and worsening hypermetabolic melena metabolic lymph nodes including hypermetabolic supraclavicular node.  Therefore, at treatment option will be systemic chemotherapy in light of the recent imaging findings.  She will be followed by her surgical oncologist Dr. Christopher      IT was determined to proceed with systemic chemotherapy   S/p  AC [id number] x 3 wks x 4 wks completed 9/6/2019   ( pt has not received in past)      PET/CT 9/19/2019 shows disease response with interval decrease in size and uptake of several right axillary lymph nodes and a supraclavicular lymph node.  Mild residual activity may indicate viable tumor.No new hypermetabolic findings worrisome for malignancy.    Pt followed by  Breast Surgery and plan was  for possible   ALND. Pt with Recurrent cancer in her right axilla and right supraclavicular fossa.   She completed chemotherapy 9/6/2019 with near CR  It was determined  Radiation will be given to the original areas of hypermetabolic activity in the right axilla and right supraclavicular region    Pt completed  RT 12/16/2019     PET/CT 1/14/21 shows   New right axillary hypermetabolic lymph nodes with preserved normal architecture suggestive of reactive nodes.  Stable appearing previously identified hypermetabolic lymph node with mild increase in surrounding fat stranding.        Findings previously d/w with treating breast surgeon and it was determined to cont to monitor and close f/u as pt is heavily pre treated, has received RT to site   Pt  acknowledged understanding and agreeable with plan     PET/CT imaging  5/20/21 shows Continued increase in both size and hypermetabolic activity of right axillary level 1 lymph nodes a new, mildly hypermetabolic cutaneous thickening of the right breast.     Right breast, skin, punch biopsy:Negative for carcinoma    LYMPH NODE, RIGHT AXILLA, BIOPSY: 6/16/21   - Metastatic poorly-differentiated carcinoma, consistent with breast primary triple neg  PD-L1 immunohistochemistry studies(combined positive score, CPS) is greater than or equal to 10   PET/CT showed  No definite evidence of hypermetabolic tumor.  Interval resolution of hypermetabolic right axillary lymph nodes.  No new hypermetabolic findings.      S/p C16 pembrolizumab 6/15/22   Last  PET/CT imaging shows  new left lower lobe opacification with mild radiotracer uptake which may represent aspiration, pneumonia, or malignancy.  Recent follow-up imaging studies decreasing patchy pulmonary consolidation within the left lower lobe when compared to prior PET-CT scan dated 07/13/2022.  Plan to remain off of therapy   Follow-up PET/CT imaging 1/11/23 Interval resolution of hypermetabolic opacification in the left lower lobe as compared to FDG PET-CT 07/14/2022.  Interval improvement in patchy opacities in the right lower lobe as compared to CT 11/21/2022   follow up PET imaging [id number] -  performed at [facility] .([facility] was not available at  since broken )No opacity in the left lower lobe on the current exam to correspond to a left lower lobe opacity described on the 7/13/2022 PET CT report.  Images from the 7/13/2022 PET CT are not available at the time of this report.       Recent follow up PET imaging 3/19/24 Similar appearing mild right breast skin thickening with decreased hypermetabolic activity. No new abnormal tracer uptake elsewhere worrisome for malignancy    3.  F/b Pulmonology  Pt on supp 02 at night   Cont MDI and O2 as prescribed     4. Rx for Mag  supp    5. Rx for K supp       Replete  Mag level     6. Pt declines GI follow up     Schedule next PAC flush on same day as visit   Cbc,cmp, mag prior to f/u 2 mos     Advance Care Planning     Power of   I previously  initiated the process of advance care planning today and explained the importance of this process to the patient.  I introduced the concept of advance directives to the patient, as well. Then the patient received detailed information about the importance of designating a Health Care Power of  (HCPOA). She was also instructed to communicate with this person about their wishes for future healthcare, should she become sick and lose decision-making capacity. The patient has previously appointed a HCPOA. After our discussion, the patient has decided to complete a HCPOA and has appointed her son and niece and  I spent a total time of 16 minutes discussing this issue with the patient.         Cc: Swetha Katz M.D.         MD Ranjan Roberson MD

## 2024-04-15 ENCOUNTER — OFFICE VISIT (OUTPATIENT)
Dept: HEMATOLOGY/ONCOLOGY | Facility: CLINIC | Age: 79
End: 2024-04-15
Payer: MEDICARE

## 2024-04-15 ENCOUNTER — INFUSION (OUTPATIENT)
Dept: INFUSION THERAPY | Facility: HOSPITAL | Age: 79
End: 2024-04-15
Attending: INTERNAL MEDICINE
Payer: MEDICARE

## 2024-04-15 VITALS
SYSTOLIC BLOOD PRESSURE: 125 MMHG | TEMPERATURE: 98 F | HEART RATE: 56 BPM | RESPIRATION RATE: 16 BRPM | DIASTOLIC BLOOD PRESSURE: 66 MMHG | OXYGEN SATURATION: 97 %

## 2024-04-15 VITALS
OXYGEN SATURATION: 99 % | RESPIRATION RATE: 18 BRPM | WEIGHT: 162.06 LBS | SYSTOLIC BLOOD PRESSURE: 100 MMHG | BODY MASS INDEX: 29.82 KG/M2 | DIASTOLIC BLOOD PRESSURE: 66 MMHG | HEIGHT: 62 IN | HEART RATE: 53 BPM

## 2024-04-15 DIAGNOSIS — R13.10 DYSPHAGIA, UNSPECIFIED TYPE: ICD-10-CM

## 2024-04-15 DIAGNOSIS — J44.9 CHRONIC OBSTRUCTIVE PULMONARY DISEASE, UNSPECIFIED COPD TYPE: ICD-10-CM

## 2024-04-15 DIAGNOSIS — Z85.118 HISTORY OF LUNG CANCER: ICD-10-CM

## 2024-04-15 DIAGNOSIS — E87.6 HYPOKALEMIA: ICD-10-CM

## 2024-04-15 DIAGNOSIS — E83.42 HYPOMAGNESEMIA: ICD-10-CM

## 2024-04-15 DIAGNOSIS — C34.90 SQUAMOUS CELL CARCINOMA LUNG: Primary | ICD-10-CM

## 2024-04-15 DIAGNOSIS — C50.919 RECURRENT BREAST CANCER, UNSPECIFIED LATERALITY: ICD-10-CM

## 2024-04-15 DIAGNOSIS — C50.919 RECURRENT BREAST CANCER, UNSPECIFIED LATERALITY: Primary | ICD-10-CM

## 2024-04-15 LAB
ALBUMIN SERPL BCP-MCNC: 3.1 G/DL (ref 3.5–5.2)
ALP SERPL-CCNC: 38 U/L (ref 55–135)
ALT SERPL W/O P-5'-P-CCNC: 17 U/L (ref 10–44)
ANION GAP SERPL CALC-SCNC: 6 MMOL/L (ref 8–16)
AST SERPL-CCNC: 13 U/L (ref 10–40)
BASOPHILS # BLD AUTO: 0.05 K/UL (ref 0–0.2)
BASOPHILS NFR BLD: 0.7 % (ref 0–1.9)
BILIRUB SERPL-MCNC: 0.4 MG/DL (ref 0.1–1)
BUN SERPL-MCNC: 20 MG/DL (ref 8–23)
CALCIUM SERPL-MCNC: 9.1 MG/DL (ref 8.7–10.5)
CHLORIDE SERPL-SCNC: 110 MMOL/L (ref 95–110)
CO2 SERPL-SCNC: 24 MMOL/L (ref 23–29)
CREAT SERPL-MCNC: 0.8 MG/DL (ref 0.5–1.4)
DIFFERENTIAL METHOD BLD: ABNORMAL
EOSINOPHIL # BLD AUTO: 0.2 K/UL (ref 0–0.5)
EOSINOPHIL NFR BLD: 3.2 % (ref 0–8)
ERYTHROCYTE [DISTWIDTH] IN BLOOD BY AUTOMATED COUNT: 13.4 % (ref 11.5–14.5)
EST. GFR  (NO RACE VARIABLE): >60 ML/MIN/1.73 M^2
GLUCOSE SERPL-MCNC: 102 MG/DL (ref 70–110)
HCT VFR BLD AUTO: 39.8 % (ref 37–48.5)
HGB BLD-MCNC: 12.7 G/DL (ref 12–16)
IMM GRANULOCYTES # BLD AUTO: 0.05 K/UL (ref 0–0.04)
IMM GRANULOCYTES NFR BLD AUTO: 0.7 % (ref 0–0.5)
LYMPHOCYTES # BLD AUTO: 1.4 K/UL (ref 1–4.8)
LYMPHOCYTES NFR BLD: 19.9 % (ref 18–48)
MAGNESIUM SERPL-MCNC: 1.5 MG/DL (ref 1.6–2.6)
MCH RBC QN AUTO: 29.8 PG (ref 27–31)
MCHC RBC AUTO-ENTMCNC: 31.9 G/DL (ref 32–36)
MCV RBC AUTO: 93 FL (ref 82–98)
MONOCYTES # BLD AUTO: 0.6 K/UL (ref 0.3–1)
MONOCYTES NFR BLD: 8.2 % (ref 4–15)
NEUTROPHILS # BLD AUTO: 4.7 K/UL (ref 1.8–7.7)
NEUTROPHILS NFR BLD: 67.3 % (ref 38–73)
NRBC BLD-RTO: 0 /100 WBC
PLATELET # BLD AUTO: 181 K/UL (ref 150–450)
PMV BLD AUTO: 10.3 FL (ref 9.2–12.9)
POTASSIUM SERPL-SCNC: 3.4 MMOL/L (ref 3.5–5.1)
PROT SERPL-MCNC: 5.8 G/DL (ref 6–8.4)
RBC # BLD AUTO: 4.26 M/UL (ref 4–5.4)
SODIUM SERPL-SCNC: 140 MMOL/L (ref 136–145)
WBC # BLD AUTO: 6.95 K/UL (ref 3.9–12.7)

## 2024-04-15 PROCEDURE — 3288F FALL RISK ASSESSMENT DOCD: CPT | Mod: HCNC,CPTII,S$GLB, | Performed by: INTERNAL MEDICINE

## 2024-04-15 PROCEDURE — 3074F SYST BP LT 130 MM HG: CPT | Mod: HCNC,CPTII,S$GLB, | Performed by: INTERNAL MEDICINE

## 2024-04-15 PROCEDURE — 83735 ASSAY OF MAGNESIUM: CPT | Mod: HCNC | Performed by: INTERNAL MEDICINE

## 2024-04-15 PROCEDURE — 85025 COMPLETE CBC W/AUTO DIFF WBC: CPT | Mod: HCNC | Performed by: INTERNAL MEDICINE

## 2024-04-15 PROCEDURE — 80053 COMPREHEN METABOLIC PANEL: CPT | Mod: HCNC | Performed by: INTERNAL MEDICINE

## 2024-04-15 PROCEDURE — 1159F MED LIST DOCD IN RCRD: CPT | Mod: HCNC,CPTII,S$GLB, | Performed by: INTERNAL MEDICINE

## 2024-04-15 PROCEDURE — 36591 DRAW BLOOD OFF VENOUS DEVICE: CPT | Mod: HCNC

## 2024-04-15 PROCEDURE — 99999 PR PBB SHADOW E&M-EST. PATIENT-LVL V: CPT | Mod: PBBFAC,HCNC,, | Performed by: INTERNAL MEDICINE

## 2024-04-15 PROCEDURE — 1126F AMNT PAIN NOTED NONE PRSNT: CPT | Mod: HCNC,CPTII,S$GLB, | Performed by: INTERNAL MEDICINE

## 2024-04-15 PROCEDURE — 63600175 PHARM REV CODE 636 W HCPCS: Mod: HCNC | Performed by: INTERNAL MEDICINE

## 2024-04-15 PROCEDURE — 3078F DIAST BP <80 MM HG: CPT | Mod: HCNC,CPTII,S$GLB, | Performed by: INTERNAL MEDICINE

## 2024-04-15 PROCEDURE — 1157F ADVNC CARE PLAN IN RCRD: CPT | Mod: HCNC,CPTII,S$GLB, | Performed by: INTERNAL MEDICINE

## 2024-04-15 PROCEDURE — 99214 OFFICE O/P EST MOD 30 MIN: CPT | Mod: HCNC,S$GLB,, | Performed by: INTERNAL MEDICINE

## 2024-04-15 PROCEDURE — 1101F PT FALLS ASSESS-DOCD LE1/YR: CPT | Mod: HCNC,CPTII,S$GLB, | Performed by: INTERNAL MEDICINE

## 2024-04-15 RX ORDER — HEPARIN 100 UNIT/ML
500 SYRINGE INTRAVENOUS
Status: DISCONTINUED | OUTPATIENT
Start: 2024-04-15 | End: 2024-04-15 | Stop reason: HOSPADM

## 2024-04-15 RX ORDER — SODIUM CHLORIDE 0.9 % (FLUSH) 0.9 %
10 SYRINGE (ML) INJECTION
Status: DISCONTINUED | OUTPATIENT
Start: 2024-04-15 | End: 2024-04-15 | Stop reason: HOSPADM

## 2024-04-15 RX ORDER — POTASSIUM CHLORIDE 20 MEQ/1
20 TABLET, EXTENDED RELEASE ORAL DAILY
Qty: 4 TABLET | Refills: 0 | Status: SHIPPED | OUTPATIENT
Start: 2024-04-15 | End: 2024-04-19

## 2024-04-15 RX ORDER — LANOLIN ALCOHOL/MO/W.PET/CERES
400 CREAM (GRAM) TOPICAL DAILY
Qty: 15 TABLET | Refills: 0 | Status: SHIPPED | OUTPATIENT
Start: 2024-04-15 | End: 2024-05-15

## 2024-04-15 RX ADMIN — HEPARIN 500 UNITS: 100 SYRINGE at 08:04

## 2024-04-15 NOTE — PLAN OF CARE
Oncology History   Metastatic breast cancer    Patient presented to unit for PAC labs. VSS. Port accessed, flushed w/o difficulty, blood return present, labs collected and sent. Port re-flushed and heparin locked. Patient discharged from unit in NAD.

## 2024-04-18 ENCOUNTER — TELEPHONE (OUTPATIENT)
Dept: GASTROENTEROLOGY | Facility: CLINIC | Age: 79
End: 2024-04-18
Payer: MEDICARE

## 2024-04-18 ENCOUNTER — TELEPHONE (OUTPATIENT)
Dept: FAMILY MEDICINE | Facility: CLINIC | Age: 79
End: 2024-04-18
Payer: MEDICARE

## 2024-04-18 NOTE — TELEPHONE ENCOUNTER
Patient  states she is having stomach pains after eating for 3 days. Patient confirms is having normal bowel movements. Requesting Protonix and GI had ordered it. She is going to call in for refills. Patient has 3 refills and she is uncertain if she needs to call her pharmacy.  She is going to call pharmacy and check the status of refills.

## 2024-04-23 ENCOUNTER — TELEPHONE (OUTPATIENT)
Dept: GASTROENTEROLOGY | Facility: CLINIC | Age: 79
End: 2024-04-23
Payer: MEDICARE

## 2024-04-23 NOTE — TELEPHONE ENCOUNTER
----- Message from Claire Mathur sent at 4/18/2024  3:13 PM CDT -----  Type: Patient Call Back    Who called:Self    What is the request in detail:In regards to appt request    Can the clinic reply by [facility]?No    Would the patient rather a call back or a response via [facility]? Call    Best call back number:[phone] (home)       Additional Information:

## 2024-05-01 ENCOUNTER — OFFICE VISIT (OUTPATIENT)
Dept: GASTROENTEROLOGY | Facility: CLINIC | Age: 79
End: 2024-05-01
Payer: MEDICARE

## 2024-05-01 ENCOUNTER — LAB VISIT (OUTPATIENT)
Dept: LAB | Facility: HOSPITAL | Age: 79
End: 2024-05-01
Attending: STUDENT IN AN ORGANIZED HEALTH CARE EDUCATION/TRAINING PROGRAM
Payer: MEDICARE

## 2024-05-01 VITALS
SYSTOLIC BLOOD PRESSURE: 119 MMHG | WEIGHT: 158.94 LBS | HEIGHT: 62 IN | BODY MASS INDEX: 29.25 KG/M2 | DIASTOLIC BLOOD PRESSURE: 76 MMHG | HEART RATE: 52 BPM

## 2024-05-01 DIAGNOSIS — R10.13 EPIGASTRIC ABDOMINAL PAIN: Primary | ICD-10-CM

## 2024-05-01 DIAGNOSIS — N17.9 AKI (ACUTE KIDNEY INJURY): Primary | ICD-10-CM

## 2024-05-01 DIAGNOSIS — K31.A0 GASTRIC INTESTINAL METAPLASIA: ICD-10-CM

## 2024-05-01 DIAGNOSIS — R10.13 EPIGASTRIC ABDOMINAL PAIN: ICD-10-CM

## 2024-05-01 DIAGNOSIS — K22.0 ACHALASIA: ICD-10-CM

## 2024-05-01 LAB
ALBUMIN SERPL BCP-MCNC: 3.8 G/DL (ref 3.5–5.2)
ALP SERPL-CCNC: 48 U/L (ref 55–135)
ALT SERPL W/O P-5'-P-CCNC: 17 U/L (ref 10–44)
ANION GAP SERPL CALC-SCNC: 10 MMOL/L (ref 8–16)
AST SERPL-CCNC: 16 U/L (ref 10–40)
BASOPHILS # BLD AUTO: 0.07 K/UL (ref 0–0.2)
BASOPHILS NFR BLD: 0.9 % (ref 0–1.9)
BILIRUB SERPL-MCNC: 0.4 MG/DL (ref 0.1–1)
BUN SERPL-MCNC: 22 MG/DL (ref 8–23)
CALCIUM SERPL-MCNC: 10.1 MG/DL (ref 8.7–10.5)
CHLORIDE SERPL-SCNC: 103 MMOL/L (ref 95–110)
CO2 SERPL-SCNC: 25 MMOL/L (ref 23–29)
CREAT SERPL-MCNC: 1.1 MG/DL (ref 0.5–1.4)
CREAT SERPL-MCNC: 1.1 MG/DL (ref 0.5–1.4)
DIFFERENTIAL METHOD BLD: ABNORMAL
EOSINOPHIL # BLD AUTO: 0.1 K/UL (ref 0–0.5)
EOSINOPHIL NFR BLD: 0.9 % (ref 0–8)
ERYTHROCYTE [DISTWIDTH] IN BLOOD BY AUTOMATED COUNT: 13.3 % (ref 11.5–14.5)
EST. GFR  (NO RACE VARIABLE): 52 ML/MIN/1.73 M^2
EST. GFR  (NO RACE VARIABLE): 52 ML/MIN/1.73 M^2
GLUCOSE SERPL-MCNC: 108 MG/DL (ref 70–110)
HCT VFR BLD AUTO: 42.1 % (ref 37–48.5)
HGB BLD-MCNC: 13.2 G/DL (ref 12–16)
IMM GRANULOCYTES # BLD AUTO: 0.15 K/UL (ref 0–0.04)
IMM GRANULOCYTES NFR BLD AUTO: 2 % (ref 0–0.5)
LIPASE SERPL-CCNC: 15 U/L (ref 4–60)
LYMPHOCYTES # BLD AUTO: 1.8 K/UL (ref 1–4.8)
LYMPHOCYTES NFR BLD: 24.1 % (ref 18–48)
MCH RBC QN AUTO: 29.5 PG (ref 27–31)
MCHC RBC AUTO-ENTMCNC: 31.4 G/DL (ref 32–36)
MCV RBC AUTO: 94 FL (ref 82–98)
MONOCYTES # BLD AUTO: 0.7 K/UL (ref 0.3–1)
MONOCYTES NFR BLD: 9.2 % (ref 4–15)
NEUTROPHILS # BLD AUTO: 4.7 K/UL (ref 1.8–7.7)
NEUTROPHILS NFR BLD: 62.9 % (ref 38–73)
NRBC BLD-RTO: 0 /100 WBC
PLATELET # BLD AUTO: 303 K/UL (ref 150–450)
PMV BLD AUTO: 9.2 FL (ref 9.2–12.9)
POTASSIUM SERPL-SCNC: 5.3 MMOL/L (ref 3.5–5.1)
PROT SERPL-MCNC: 7 G/DL (ref 6–8.4)
RBC # BLD AUTO: 4.47 M/UL (ref 4–5.4)
SODIUM SERPL-SCNC: 138 MMOL/L (ref 136–145)
WBC # BLD AUTO: 7.51 K/UL (ref 3.9–12.7)

## 2024-05-01 PROCEDURE — 3078F DIAST BP <80 MM HG: CPT | Mod: HCNC,CPTII,S$GLB, | Performed by: STUDENT IN AN ORGANIZED HEALTH CARE EDUCATION/TRAINING PROGRAM

## 2024-05-01 PROCEDURE — 80053 COMPREHEN METABOLIC PANEL: CPT | Mod: HCNC | Performed by: STUDENT IN AN ORGANIZED HEALTH CARE EDUCATION/TRAINING PROGRAM

## 2024-05-01 PROCEDURE — 99999 PR PBB SHADOW E&M-EST. PATIENT-LVL V: CPT | Mod: PBBFAC,HCNC,, | Performed by: STUDENT IN AN ORGANIZED HEALTH CARE EDUCATION/TRAINING PROGRAM

## 2024-05-01 PROCEDURE — 1101F PT FALLS ASSESS-DOCD LE1/YR: CPT | Mod: HCNC,CPTII,S$GLB, | Performed by: STUDENT IN AN ORGANIZED HEALTH CARE EDUCATION/TRAINING PROGRAM

## 2024-05-01 PROCEDURE — 1126F AMNT PAIN NOTED NONE PRSNT: CPT | Mod: HCNC,CPTII,S$GLB, | Performed by: STUDENT IN AN ORGANIZED HEALTH CARE EDUCATION/TRAINING PROGRAM

## 2024-05-01 PROCEDURE — 36415 COLL VENOUS BLD VENIPUNCTURE: CPT | Mod: HCNC | Performed by: STUDENT IN AN ORGANIZED HEALTH CARE EDUCATION/TRAINING PROGRAM

## 2024-05-01 PROCEDURE — 85025 COMPLETE CBC W/AUTO DIFF WBC: CPT | Mod: HCNC | Performed by: STUDENT IN AN ORGANIZED HEALTH CARE EDUCATION/TRAINING PROGRAM

## 2024-05-01 PROCEDURE — 1157F ADVNC CARE PLAN IN RCRD: CPT | Mod: HCNC,CPTII,S$GLB, | Performed by: STUDENT IN AN ORGANIZED HEALTH CARE EDUCATION/TRAINING PROGRAM

## 2024-05-01 PROCEDURE — 3074F SYST BP LT 130 MM HG: CPT | Mod: HCNC,CPTII,S$GLB, | Performed by: STUDENT IN AN ORGANIZED HEALTH CARE EDUCATION/TRAINING PROGRAM

## 2024-05-01 PROCEDURE — 3288F FALL RISK ASSESSMENT DOCD: CPT | Mod: HCNC,CPTII,S$GLB, | Performed by: STUDENT IN AN ORGANIZED HEALTH CARE EDUCATION/TRAINING PROGRAM

## 2024-05-01 PROCEDURE — 83690 ASSAY OF LIPASE: CPT | Mod: HCNC | Performed by: STUDENT IN AN ORGANIZED HEALTH CARE EDUCATION/TRAINING PROGRAM

## 2024-05-01 PROCEDURE — 99214 OFFICE O/P EST MOD 30 MIN: CPT | Mod: HCNC,S$GLB,, | Performed by: STUDENT IN AN ORGANIZED HEALTH CARE EDUCATION/TRAINING PROGRAM

## 2024-05-01 PROCEDURE — 1159F MED LIST DOCD IN RCRD: CPT | Mod: HCNC,CPTII,S$GLB, | Performed by: STUDENT IN AN ORGANIZED HEALTH CARE EDUCATION/TRAINING PROGRAM

## 2024-05-01 RX ORDER — ALUMINUM HYDROXIDE, MAGNESIUM HYDROXIDE, AND SIMETHICONE 2400; 240; 2400 MG/30ML; MG/30ML; MG/30ML
10 SUSPENSION ORAL EVERY 6 HOURS PRN
Qty: 335 ML | Refills: 0 | Status: SHIPPED | OUTPATIENT
Start: 2024-05-01 | End: 2025-05-01

## 2024-05-01 NOTE — PROGRESS NOTES
[facility]    Reason for visit: The primary encounter diagnosis was Epigastric abdominal pain. Diagnoses of Achalasia and Gastric intestinal metaplasia were also pertinent to this visit.  Referring Provider/PCP: Jeannine Huitron MD    History of Present Illness:  Ade Castellano is a 78 y.o. female with a history of COPD, CAD, breast cancer '15, lung cancer s/p chemo/radiation  who is presenting for follow-up evaluation of abdominal pain.    She was previously seen by multiple GI providers in our department, most recently by Dr. Phillips before me.  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  Esophageal manometry was positive for type 3 achalasia with certain features of type 2 achalasia.  When she was seen by Dr. Phillips in July 2023, the diagnosis of achalasia was discussed and the patient refused any surgical interventions due to her age and other comorbidities so she elected for EGD with Botox injections to the LES.  This was performed in September 2023, images reviewed, she had a hypertonic lower esophageal sphincter with Botox injection.  The stomach showed irregular nodular mucosa with biopsies positive for complete gastric intestinal metaplasia.    I saw her for the first time in Novemeber 2023, at which time she reported that her symptoms are significantly improved if not completely resolved.  Her Eckardt score was 1-2. Dysphagia 1, regurgitation 0, chest pain 0, weight loss 0-1 (intermittent weight fluctuations). She was still not interested in any surgical interventions. The plan was to follow up every 6 months and perform EGDs with botox injection as needed.    Today, the patient reports that she has been having abdominal pain for the past 3 weeks, described as abdominal distention and epigastric burning radiating to the back  that is causing her to have shortness of breath.  It is worse when she sits up and is mostly on the right side.  No nausea or vomiting.  Having regular bowel movements 1 to 2 times a day.  This has never happened to her before.  Reported some dark stools yesterday but that has since resolved.  She takes pantoprazole daily.  No NSAID use.  She is swallowing okay although occasionally is having trouble.      Physical Exam:  Constitutional:  not in acute distress and well developed  HENT: Head: Normal, normocephalic, atraumatic.  Eyes: conjunctiva clear and sclera nonicteric  GI: distended, soft, tender to palpation in the epigastrium and bilateral rib cage  Skin: normal color  Neurological: alert, oriented x3  Psychiatric: mood and affect are within normal limits, pt is a good historian; no memory problems were noted    Laboratory:  Normal CBC, CMP 4/15    Imaging:  Imaging reviewed: FL esophagram. Interpretation: narrowing at the LES, eventual passage of barium    Endoscopy:  Colonoscopy 2022, 7 small polyps removed - tubular adenomas.  EGD 9/2023 - hypertonic LES, injected with botox    Assessment:  Ade Castellano is a 78 y.o. female who is presenting for follow-up evaluation of achalasia    Problems:  Type 3 achalasia  The patient has type 3 achalasia with features of type 2 achalasia, is not interested in any surgeries at this time.  She improved significantly with Botox injections with an Eckardt score of 1 at this time.  Explained that most people relapse after a period of time, she is okay with getting repeat Botox injections.  Will plan for repeat EGDs as needed when symptoms recur    Complete gastric intestinal metaplasia  Discussed gastric intestinal metaplasia, given her history of multiple cancers and her concern about developing no cancer, she is interested in surveillance.  Will repeat EGD in 3 years for surveillance.    Abdominal pain, new  Reports new onset epigastric abdominal burning radiating to  the back bilaterally for the past 3 weeks associated with shortness of breath when her abdomen is distended.  No obvious ascites on exam.  No constipation.  The cause of the pain is unclear, differential includes biliary pain, pancreatitis, PUD, functional dyspepsia, pneumonia. Will obtain CBC, CMP and lipase to evaluate for any biliary causes, pancreatitis and Hgb change since she reported some dark stools. If Hgb is down or CT with no cause for her symptoms, may need EGD.  Will obtain CT abdomen pelvis along with chest to evaluate for abdominal distention and the cause of the pain, adding the chest on since the patient is significantly more short of breath, is audibly wheezing today and requiring more oxygen at home. SpO2 96% in the office today at rest. Instructed her to go to the emergency department if she continues to require oxygen consistently.      Plan:  CT chest abdomen pelvis for abdominal pain and shortness of breath  Cbc, CMP, lipase  Mylanta as needed for abdominal distension  EGD in 3 years in surveillance of GIM  EGD with botox injections PRN  Continue protonix 40mg daily  F/u pending the above    Bria Posadas MD  Gastroenterology and Hepatology    Orders Placed This Encounter   Procedures    CT Chest Abdomen Pelvis With IV Contrast (XPD) Routine Oral Contrast    Creatinine, Serum    COMPREHENSIVE METABOLIC PANEL    Lipase    CBC W/ AUTO DIFFERENTIAL

## 2024-05-03 ENCOUNTER — HOSPITAL ENCOUNTER (OUTPATIENT)
Dept: RADIOLOGY | Facility: HOSPITAL | Age: 79
Discharge: HOME | End: 2024-05-03
Attending: STUDENT IN AN ORGANIZED HEALTH CARE EDUCATION/TRAINING PROGRAM
Payer: MEDICARE

## 2024-05-03 DIAGNOSIS — R10.13 EPIGASTRIC ABDOMINAL PAIN: ICD-10-CM

## 2024-05-03 PROCEDURE — 74177 CT ABD & PELVIS W/CONTRAST: CPT | Mod: 26,HCNC,, | Performed by: RADIOLOGY

## 2024-05-03 PROCEDURE — 71260 CT THORAX DX C+: CPT | Mod: 26,HCNC,, | Performed by: RADIOLOGY

## 2024-05-03 PROCEDURE — 74177 CT ABD & PELVIS W/CONTRAST: CPT | Mod: TC,HCNC

## 2024-05-03 PROCEDURE — 25500020 PHARM REV CODE 255: Mod: HCNC | Performed by: STUDENT IN AN ORGANIZED HEALTH CARE EDUCATION/TRAINING PROGRAM

## 2024-05-03 RX ADMIN — IOHEXOL 75 ML: 350 INJECTION, SOLUTION INTRAVENOUS at 11:05

## 2024-05-03 RX ADMIN — IOHEXOL 15 ML: 300 INJECTION, SOLUTION INTRAVENOUS at 11:05

## 2024-05-13 RX ORDER — DICLOFENAC SODIUM 50 MG/1
50 TABLET, DELAYED RELEASE ORAL 2 TIMES DAILY PRN
Qty: 45 TABLET | Refills: 2 | Status: SHIPPED | OUTPATIENT
Start: 2024-05-13 | End: 2024-05-21

## 2024-05-13 NOTE — TELEPHONE ENCOUNTER
No care due was identified.  [facility] Embedded Care Due Messages. Reference number: [id number].   5/13/2024 9:09:51 AM CDT

## 2024-05-21 ENCOUNTER — HOSPITAL ENCOUNTER (OUTPATIENT)
Facility: HOSPITAL | Age: 79
Discharge: HOME | End: 2024-05-22
Attending: EMERGENCY MEDICINE | Admitting: HOSPITALIST
Payer: MEDICARE

## 2024-05-21 DIAGNOSIS — I63.9 STROKE: ICD-10-CM

## 2024-05-21 DIAGNOSIS — R29.818 ACUTE FOCAL NEUROLOGICAL DEFICIT: ICD-10-CM

## 2024-05-21 DIAGNOSIS — G45.9 TIA (TRANSIENT ISCHEMIC ATTACK): Primary | ICD-10-CM

## 2024-05-21 PROBLEM — J43.2 CENTRILOBULAR EMPHYSEMA: Chronic | Status: ACTIVE | Noted: 2018-02-06

## 2024-05-21 PROBLEM — I10 HYPERTENSION: Chronic | Status: ACTIVE | Noted: 2019-10-10

## 2024-05-21 LAB
ALBUMIN SERPL BCP-MCNC: 3.5 G/DL (ref 3.5–5.2)
ALLENS TEST: ABNORMAL
ALP SERPL-CCNC: 42 U/L (ref 55–135)
ALT SERPL W/O P-5'-P-CCNC: 18 U/L (ref 10–44)
ANION GAP SERPL CALC-SCNC: 9 MMOL/L (ref 8–16)
AST SERPL-CCNC: 17 U/L (ref 10–40)
BASOPHILS # BLD AUTO: 0.04 K/UL (ref 0–0.2)
BASOPHILS NFR BLD: 0.8 % (ref 0–1.9)
BILIRUB SERPL-MCNC: 0.4 MG/DL (ref 0.1–1)
BNP SERPL-MCNC: 169 PG/ML (ref 0–99)
BUN SERPL-MCNC: 18 MG/DL (ref 8–23)
CALCIUM SERPL-MCNC: 9.2 MG/DL (ref 8.7–10.5)
CHLORIDE SERPL-SCNC: 107 MMOL/L (ref 95–110)
CHOLEST SERPL-MCNC: 156 MG/DL (ref 120–199)
CHOLEST/HDLC SERPL: 2.7 {RATIO} (ref 2–5)
CO2 SERPL-SCNC: 22 MMOL/L (ref 23–29)
CREAT SERPL-MCNC: 1 MG/DL (ref 0.5–1.4)
DIFFERENTIAL METHOD BLD: NORMAL
EOSINOPHIL # BLD AUTO: 0.2 K/UL (ref 0–0.5)
EOSINOPHIL NFR BLD: 4.4 % (ref 0–8)
ERYTHROCYTE [DISTWIDTH] IN BLOOD BY AUTOMATED COUNT: 13.4 % (ref 11.5–14.5)
EST. GFR  (NO RACE VARIABLE): 58 ML/MIN/1.73 M^2
GLUCOSE SERPL-MCNC: 94 MG/DL (ref 70–110)
HCT VFR BLD AUTO: 38.2 % (ref 37–48.5)
HDLC SERPL-MCNC: 58 MG/DL (ref 40–75)
HDLC SERPL: 37.2 % (ref 20–50)
HGB BLD-MCNC: 12.5 G/DL (ref 12–16)
IMM GRANULOCYTES # BLD AUTO: 0.02 K/UL (ref 0–0.04)
IMM GRANULOCYTES NFR BLD AUTO: 0.4 % (ref 0–0.5)
INR PPP: 1 (ref 0.8–1.2)
LDLC SERPL CALC-MCNC: 62.4 MG/DL (ref 63–159)
LYMPHOCYTES # BLD AUTO: 1.2 K/UL (ref 1–4.8)
LYMPHOCYTES NFR BLD: 25.8 % (ref 18–48)
MCH RBC QN AUTO: 30.1 PG (ref 27–31)
MCHC RBC AUTO-ENTMCNC: 32.7 G/DL (ref 32–36)
MCV RBC AUTO: 92 FL (ref 82–98)
MONOCYTES # BLD AUTO: 0.4 K/UL (ref 0.3–1)
MONOCYTES NFR BLD: 9.1 % (ref 4–15)
NEUTROPHILS # BLD AUTO: 2.9 K/UL (ref 1.8–7.7)
NEUTROPHILS NFR BLD: 59.5 % (ref 38–73)
NONHDLC SERPL-MCNC: 98 MG/DL
NRBC BLD-RTO: 0 /100 WBC
PLATELET # BLD AUTO: 155 K/UL (ref 150–450)
PMV BLD AUTO: 9.6 FL (ref 9.2–12.9)
POC PTINR: 2 (ref 0.9–1.2)
POC PTWBT: 22.9 SEC (ref 9.7–14.3)
POCT GLUCOSE: 128 MG/DL (ref 70–110)
POCT GLUCOSE: 143 MG/DL (ref 70–110)
POTASSIUM SERPL-SCNC: 3.9 MMOL/L (ref 3.5–5.1)
PROT SERPL-MCNC: 6.4 G/DL (ref 6–8.4)
PROTHROMBIN TIME: 10.6 SEC (ref 9–12.5)
RBC # BLD AUTO: 4.15 M/UL (ref 4–5.4)
SAMPLE: ABNORMAL
SITE: ABNORMAL
SODIUM SERPL-SCNC: 138 MMOL/L (ref 136–145)
TRIGL SERPL-MCNC: 178 MG/DL (ref 30–150)
TROPONIN I SERPL DL<=0.01 NG/ML-MCNC: 0.01 NG/ML (ref 0–0.03)
TSH SERPL DL<=0.005 MIU/L-ACNC: 1.96 UIU/ML (ref 0.4–4)
WBC # BLD AUTO: 4.81 K/UL (ref 3.9–12.7)

## 2024-05-21 PROCEDURE — 99285 EMERGENCY DEPT VISIT HI MDM: CPT | Mod: 25,HCNC

## 2024-05-21 PROCEDURE — 93010 ELECTROCARDIOGRAM REPORT: CPT | Mod: HCNC,,, | Performed by: INTERNAL MEDICINE

## 2024-05-21 PROCEDURE — 83880 ASSAY OF NATRIURETIC PEPTIDE: CPT | Mod: HCNC | Performed by: EMERGENCY MEDICINE

## 2024-05-21 PROCEDURE — 63600175 PHARM REV CODE 636 W HCPCS: Mod: HCNC | Performed by: HOSPITALIST

## 2024-05-21 PROCEDURE — G0378 HOSPITAL OBSERVATION PER HR: HCPCS | Mod: HCNC

## 2024-05-21 PROCEDURE — 99900035 HC TECH TIME PER 15 MIN (STAT): Mod: HCNC

## 2024-05-21 PROCEDURE — 25500020 PHARM REV CODE 255: Mod: HCNC | Performed by: EMERGENCY MEDICINE

## 2024-05-21 PROCEDURE — 85610 PROTHROMBIN TIME: CPT | Mod: HCNC

## 2024-05-21 PROCEDURE — 25000003 PHARM REV CODE 250: Mod: HCNC | Performed by: EMERGENCY MEDICINE

## 2024-05-21 PROCEDURE — 25000003 PHARM REV CODE 250: Mod: HCNC | Performed by: HOSPITALIST

## 2024-05-21 PROCEDURE — 96372 THER/PROPH/DIAG INJ SC/IM: CPT | Mod: 59 | Performed by: HOSPITALIST

## 2024-05-21 PROCEDURE — 82962 GLUCOSE BLOOD TEST: CPT | Mod: HCNC

## 2024-05-21 PROCEDURE — 80053 COMPREHEN METABOLIC PANEL: CPT | Mod: HCNC | Performed by: EMERGENCY MEDICINE

## 2024-05-21 PROCEDURE — 93005 ELECTROCARDIOGRAM TRACING: CPT | Mod: HCNC

## 2024-05-21 PROCEDURE — G0426 INPT/ED TELECONSULT50: HCPCS | Mod: HCNC,95,, | Performed by: PSYCHIATRY & NEUROLOGY

## 2024-05-21 PROCEDURE — 85025 COMPLETE CBC W/AUTO DIFF WBC: CPT | Mod: HCNC | Performed by: EMERGENCY MEDICINE

## 2024-05-21 PROCEDURE — 80061 LIPID PANEL: CPT | Mod: HCNC | Performed by: EMERGENCY MEDICINE

## 2024-05-21 PROCEDURE — 84484 ASSAY OF TROPONIN QUANT: CPT | Mod: HCNC | Performed by: EMERGENCY MEDICINE

## 2024-05-21 PROCEDURE — 84443 ASSAY THYROID STIM HORMONE: CPT | Mod: HCNC | Performed by: EMERGENCY MEDICINE

## 2024-05-21 PROCEDURE — 85610 PROTHROMBIN TIME: CPT | Mod: HCNC | Performed by: EMERGENCY MEDICINE

## 2024-05-21 RX ORDER — DICLOFENAC SODIUM 50 MG/1
50 TABLET, DELAYED RELEASE ORAL 2 TIMES DAILY
COMMUNITY

## 2024-05-21 RX ORDER — LABETALOL HYDROCHLORIDE 5 MG/ML
10 INJECTION, SOLUTION INTRAVENOUS
Status: DISCONTINUED | OUTPATIENT
Start: 2024-05-21 | End: 2024-05-22 | Stop reason: HOSPADM

## 2024-05-21 RX ORDER — ACETAMINOPHEN 325 MG/1
650 TABLET ORAL EVERY 6 HOURS PRN
Status: DISCONTINUED | OUTPATIENT
Start: 2024-05-21 | End: 2024-05-22 | Stop reason: HOSPADM

## 2024-05-21 RX ORDER — ASPIRIN 81 MG/1
81 TABLET ORAL DAILY
Status: DISCONTINUED | OUTPATIENT
Start: 2024-05-22 | End: 2024-05-22 | Stop reason: HOSPADM

## 2024-05-21 RX ORDER — TALC
6 POWDER (GRAM) TOPICAL NIGHTLY
Status: DISCONTINUED | OUTPATIENT
Start: 2024-05-21 | End: 2024-05-22 | Stop reason: HOSPADM

## 2024-05-21 RX ORDER — CLOPIDOGREL BISULFATE 75 MG/1
75 TABLET ORAL DAILY
Status: DISCONTINUED | OUTPATIENT
Start: 2024-05-22 | End: 2024-05-22 | Stop reason: HOSPADM

## 2024-05-21 RX ORDER — CLOPIDOGREL BISULFATE 300 MG/1
300 TABLET, FILM COATED ORAL ONCE
Status: COMPLETED | OUTPATIENT
Start: 2024-05-21 | End: 2024-05-21

## 2024-05-21 RX ORDER — BISACODYL 10 MG/1
10 SUPPOSITORY RECTAL DAILY PRN
Status: DISCONTINUED | OUTPATIENT
Start: 2024-05-21 | End: 2024-05-22 | Stop reason: HOSPADM

## 2024-05-21 RX ORDER — ATORVASTATIN CALCIUM 40 MG/1
40 TABLET, FILM COATED ORAL DAILY
Status: DISCONTINUED | OUTPATIENT
Start: 2024-05-22 | End: 2024-05-22 | Stop reason: HOSPADM

## 2024-05-21 RX ORDER — ONDANSETRON HYDROCHLORIDE 2 MG/ML
4 INJECTION, SOLUTION INTRAVENOUS EVERY 12 HOURS PRN
Status: DISCONTINUED | OUTPATIENT
Start: 2024-05-21 | End: 2024-05-22 | Stop reason: HOSPADM

## 2024-05-21 RX ORDER — SODIUM CHLORIDE 0.9 % (FLUSH) 0.9 %
10 SYRINGE (ML) INJECTION
Status: DISCONTINUED | OUTPATIENT
Start: 2024-05-21 | End: 2024-05-22 | Stop reason: HOSPADM

## 2024-05-21 RX ORDER — POLYETHYLENE GLYCOL 3350 17 G/17G
17 POWDER, FOR SOLUTION ORAL DAILY
Status: DISCONTINUED | OUTPATIENT
Start: 2024-05-22 | End: 2024-05-22 | Stop reason: HOSPADM

## 2024-05-21 RX ORDER — ENOXAPARIN SODIUM 100 MG/ML
40 INJECTION SUBCUTANEOUS EVERY 24 HOURS
Status: DISCONTINUED | OUTPATIENT
Start: 2024-05-21 | End: 2024-05-22 | Stop reason: HOSPADM

## 2024-05-21 RX ADMIN — Medication 6 MG: at 09:05

## 2024-05-21 RX ADMIN — IOHEXOL 80 ML: 350 INJECTION, SOLUTION INTRAVENOUS at 01:05

## 2024-05-21 RX ADMIN — CLOPIDOGREL BISULFATE 300 MG: 300 TABLET, FILM COATED ORAL at 05:05

## 2024-05-21 RX ADMIN — ENOXAPARIN SODIUM 40 MG: 40 INJECTION SUBCUTANEOUS at 05:05

## 2024-05-21 NOTE — ED PROVIDER NOTES
"Encounter Date: 5/21/2024    SCRIBE #1 NOTE: I, Amanda Lesley, am scribing for, and in the presence of,  Javy Quinonez MD.       History     Chief Complaint   Patient presents with    Dizziness     Pt w/ a history MI (2006-stents), cervical, lung, breast and lymph nodes, now in remission, COPD. No history of HTN.  Pt presents w/  c/o dizziness over the last few days w/ blurry vision, frontal HA since today.  Pt also c/o "pulling to the right" and "slurring my words" on/off today since about 10:30.  Denies CP but does have SOB and left upper arm pain. Denies blood thinners. Dr Quinonez at triage evaluating pt for stroke.     79 yo F w/ PMHx of CAD s/p multiple stents, CHF, L bell's palsy, COPD, HTN, HLD, h/o lung cancer presenting to the ED for dizziness, acute neurologic deficits     Patient reports dizziness for the last couple of days. Today, around 10:30 AM she started experiencing a frontal headache, feeling like her body is pulling to the R, and intermittent slurring of her speech. Also reports L arm pain and back pain. Further notes bl blurry vision. Has L facial droop which pt states is chronic and unchanged 2/2 hx bells palsy. No other exacerbating or alleviating factors. Denies paresthesias or weakness to arms or legs, other neurologic deficits, or other associated symptoms. She is on ASA daily.     The history is provided by the patient.     Review of patient's allergies indicates:  No Known Allergies  Past Medical History:   Diagnosis Date    Abnormal stress test 8/5/2020    Acute respiratory failure with hypoxia and hypercapnia 11/29/2017    Angina pectoris     Arthritis     Bell's palsy     left facial weakness    Breast cancer     RIGHT    CAD (coronary artery disease)     Cervical cancer     Chronic bronchitis     Chronic heart failure with preserved ejection fraction 8/5/2020    Chronic heart failure with preserved ejection fraction 8/5/2020    COPD (chronic obstructive pulmonary disease)     Dr." Katz    Dental bridge present     Emphysema of lung     H/O colonoscopy 06/2017    due for repeat colonsocopy in 6/2018    History of Bell's palsy     History of heart artery stent     Dr. Ortiz  x2 stents    Hyperlipidemia     Hypertension     Lung cancer     Myocardial infarction     SUNITHA (obstructive sleep apnea)     intolerant to mask    SUNITHA (obstructive sleep apnea)     intolerant to mask     Pneumonia     Pneumonia due to other staphylococcus     PUD (peptic ulcer disease)     Severe anemia 6/11/2018    Sleep apnea     Vaginal delivery     x1     Past Surgical History:   Procedure Laterality Date    ADENOIDECTOMY      BREAST BIOPSY Right     x3    BREAST LUMPECTOMY      BREAST SURGERY      lumpectomy right side     CERVIX SURGERY      cone    COLONOSCOPY N/A 3/17/2017    Procedure: COLONOSCOPY;  Surgeon: Julio Rudd MD;  Location: [facility];  Service: Endoscopy;  Laterality: N/A;    COLONOSCOPY N/A 6/30/2017    Procedure: COLONOSCOPY;  Surgeon: Julio Rudd MD;  Location: [facility];  Service: Endoscopy;  Laterality: N/A;    COLONOSCOPY N/A 11/28/2018    Procedure: COLONOSCOPY;  Surgeon: Nura Urbina MD;  Location: [facility];  Service: Endoscopy;  Laterality: N/A;    COLONOSCOPY N/A 1/29/2019    Procedure: COLONOSCOPY;  Surgeon: Emmanuel Perez MD;  Location: [facility];  Service: Endoscopy;  Laterality: N/A;  confirmed appt-SP    COLONOSCOPY N/A 7/26/2022    Procedure: COLONOSCOPY;  Surgeon: James Healy MD;  Location: [facility];  Service: Endoscopy;  Laterality: N/A;  fully vaccinated -sm  mediport in chest -     CORONARY ANGIOPLASTY WITH STENT PLACEMENT      ESOPHAGEAL MANOMETRY WITH MEASUREMENT OF IMPEDANCE N/A 7/10/2023    Procedure: MANOMETRY, ESOPHAGUS, WITH IMPEDANCE MEASUREMENT;  Surgeon: Miller Phillips MD;  Location: [facility] (4TH FLR);  Service: Gastroenterology;  Laterality: N/A;  pt on oxygen 2.5L  pt requested Tuesday only  5/31 referred by Dr. Miller Phillips/instr.  mailed-st  7/3-r/s, updated instructions reviewed with pt in detail via phone, pt verbalized understanding-KPvt    ESOPHAGOGASTRODUODENOSCOPY N/A 1/25/2021    Procedure: EGD (ESOPHAGOGASTRODUODENOSCOPY);  Surgeon: Dmitry Gonzalez MD;  Location: [facility];  Service: Endoscopy;  Laterality: N/A;  rapid test >50 miles -ml    ESOPHAGOGASTRODUODENOSCOPY N/A 11/8/2022    Procedure: EGD (ESOPHAGOGASTRODUODENOSCOPY);  Surgeon: Tapan Stevenson MD;  Location: [facility];  Service: Endoscopy;  Laterality: N/A;  w/dilation  fully vaccinated, instructions mailed-Kpvt  11/4 pt called did not receive instructions, does not have portal or email, went over prep instructions and medication instructions with pt on the phone -LW    ESOPHAGOGASTRODUODENOSCOPY N/A 9/19/2023    Procedure: EGD (ESOPHAGOGASTRODUODENOSCOPY);  Surgeon: Miller Phillips MD;  Location: [facility];  Service: Endoscopy;  Laterality: N/A;  botox injection  inst mailed    EYE SURGERY Bilateral 06/08/2018    cataract     LEFT HEART CATHETERIZATION Left 8/13/2020    Procedure: Left heart cath, rra, noon;  Surgeon: Guevara Skelton MD;  Location: [facility] CATH LAB;  Service: Cardiology;  Laterality: Left;  RN PREOP 8/7/2020---COVID NEGATIVE ON 8/12    PORTACATH PLACEMENT Right 01/2018    sweat glands axillary regions Bilateral     TONSILLECTOMY       Family History   Problem Relation Name Age of Onset    Cancer Mother          breast    Heart disease Mother      Breast cancer Mother      Cancer Father          lung-smoker     Cancer Sister          lung-smoker     Heart attack Sister      Cancer Maternal Grandmother      Heart disease Maternal Grandmother      Cancer Maternal Grandfather      Heart disease Maternal Grandfather      Cancer Paternal Grandmother      Cancer Paternal Grandfather      Cancer Sister          mets not sure where it started     COPD Sister      Heart disease Sister      Kidney cancer Son      Colon cancer Neg Hx      Esophageal cancer Neg Hx    "    Social History     Tobacco Use    Smoking status: Former     Current packs/day: 0.00     Average packs/day: 0.3 packs/day for 50.0 years (12.5 ttl pk-yrs)     Types: Cigarettes     Start date: 1967     Quit date: 2017     Years since quittin.5    Smokeless tobacco: Never    Tobacco comments:     7 years since smoked    Substance Use Topics    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   HENT:  Negative for congestion, rhinorrhea and sore throat.    Eyes:  Positive for visual disturbance.   Respiratory:  Negative for cough and shortness of breath.    Cardiovascular:  Negative for chest pain.   Gastrointestinal:  Negative for abdominal pain, diarrhea, nausea and vomiting.   Genitourinary:  Negative for dysuria, frequency and hematuria.   Musculoskeletal:  Negative for back pain.        +L arm pain   Skin:  Negative for rash.   Neurological:  Positive for dizziness, speech difficulty and headaches. Negative for seizures, syncope, weakness and numbness.        +"pulling to the R"       Physical Exam     Initial Vitals [24 1339]   BP Pulse Resp Temp SpO2   112/73 (!) 58 18 97.8 °F (36.6 °C) 98 %      MAP       --         Physical Exam    Nursing note and vitals reviewed.  Constitutional: She appears well-developed and well-nourished.   HENT:   Head: Normocephalic and atraumatic.   Chronic L sided facial droop 2/2 hx bells palsy   Eyes: EOM are normal. Pupils are equal, round, and reactive to light.   Neck: Neck supple. No thyromegaly present. No JVD present.   Normal range of motion.  Cardiovascular:  Normal rate and regular rhythm.     Exam reveals no gallop and no friction rub.       No murmur heard.  Pulmonary/Chest: Breath sounds normal. No respiratory distress.   Abdominal: Abdomen is soft. Bowel sounds are normal. There is no abdominal tenderness.   Musculoskeletal:         General: No tenderness or " edema. Normal range of motion.      Cervical back: Normal range of motion and neck supple.     Neurological: She is alert and oriented to person, place, and time. She has normal strength. GCS score is 15. GCS eye subscore is 4. GCS verbal subscore is 5. GCS motor subscore is 6.   Skin: Skin is warm and dry. Capillary refill takes less than 2 seconds.   Psychiatric: She has a normal mood and affect.         ED Course   Procedures  Labs Reviewed   COMPREHENSIVE METABOLIC PANEL - Abnormal; Notable for the following components:       Result Value    CO2 22 (*)     Alkaline Phosphatase 42 (*)     eGFR 58 (*)     All other components within normal limits   LIPID PANEL - Abnormal; Notable for the following components:    Triglycerides 178 (*)     LDL Cholesterol 62.4 (*)     All other components within normal limits   B-TYPE NATRIURETIC PEPTIDE - Abnormal; Notable for the following components:     (*)     All other components within normal limits   POCT GLUCOSE - Abnormal; Notable for the following components:    POCT Glucose 143 (*)     All other components within normal limits   ISTAT PROCEDURE - Abnormal; Notable for the following components:    POC PTWBT 22.9 (*)     POC PTINR 2.0 (*)     All other components within normal limits   CBC W/ AUTO DIFFERENTIAL   PROTIME-INR   TSH   TROPONIN I   POCT GLUCOSE, HAND-HELD DEVICE   POCT PROTIME-INR   ISTAT CHEM8     EKG Readings: (Independently Interpreted)   Sinus bradycardia 53  Low voltage QRS  No change from previous ekg     ECG Results              ECG 12 lead (Final result)        Collection Time Result Time QRS Duration OHS QTC Calculation    05/21/24 14:30:20 05/22/24 11:59:52 80 437                     Final result by Interface, [facility] (05/22/24 11:59:58)                   Narrative:    Test Reason : R29.818,    Vent. Rate : 053 BPM     Atrial Rate : 053 BPM     P-R Int : 168 ms          QRS Dur : 080 ms      QT Int : 466 ms       P-R-T Axes : 070 062  059 degrees     QTc Int : 437 ms    Sinus bradycardia  Low voltage QRS  Borderline Abnormal ECG  When compared with ECG of 21-SEP-2023 09:12,  No significant change was found  Confirmed by Kameron Garcia MD ([id number]) on 5/22/2024 11:59:51 AM    Referred By: JAZZ   SELF           Confirmed By:Kameron Garcia MD                                  Imaging Results              MRI Brain Without Contrast (Final result)  Result time 05/21/24 21:58:38      Final result by Glen Manriquez MD (05/21/24 21:58:38)                   Impression:      No acute intracranial process.  Specifically, no MR evidence of acute infarction.    Changes of chronic vessel ischemic disease.      Electronically signed by: Glen Manriquez MD  Date:    05/21/2024  Time:    21:58               Narrative:    EXAMINATION:  MRI BRAIN WITHOUT CONTRAST    CLINICAL HISTORY:  Transient ischemic attack (TIA);    TECHNIQUE:  Multiplanar multisequence MR imaging of the brain was performed without contrast.    COMPARISON:  CT scan dated 05/21/2024.    MRI dated 05/11/2021    FINDINGS:  The craniocervical junction is intact.  The sellar and parasellar structures are within normal limits.  The midline structures are intact.  The intracranial flow voids are within normal limits.    No diffusion-weighted signal abnormality is present.  The ventricles and sulci are prominent, consistent cerebral volume loss.  There are T2/FLAIR signal hyperintensities within the periventricular and subcortical white matter.  There are no extra-axial fluid collections.  There is no evidence of intracranial hemorrhage.  There is no evidence of mass effect.    There are postoperative changes in the globes.  The paranasal sinuses unremarkable.  The mastoid air cells are clear.                                       X-Ray Chest AP Portable (Final result)  Result time 05/21/24 16:09:40      Final result by Angus Fletcher MD (05/21/24 16:09:40)                   Impression:      As  above.      Electronically signed by: Angus Fletcher  Date:    05/21/2024  Time:    16:09               Narrative:    EXAMINATION:  XR CHEST AP PORTABLE    CLINICAL HISTORY:  Stroke;    TECHNIQUE:  Single frontal view of the chest was performed.    COMPARISON:  Chest radiograph 12/12/2023    FINDINGS:  Lines and tubes: Right chest wall Port-A-Cath.    Heart and mediastinum: Unchanged.    Pleura: No pleural effusion or pneumothorax.    Lungs: Lungs are well inflated. Bandlike opacity in the right midlung zone, likely atelectasis or scarring based on recent CT appearance.  No new consolidation.    Soft tissue/bone: Osteopenia and degenerative changes.                                       CTA Head and Neck (xpd) (Final result)  Result time 05/21/24 14:18:21      Final result by Miguel Valladares DO (05/21/24 14:18:21)                   Impression:      CTA head: Unremarkable CTA of the head specifically without evidence for proximal significant stenosis or proximal large vessel occlusion.    CTA neck: Atherosclerotic plaquing of the carotid bifurcations and proximal ICAs with less than 50% proximal ICA stenosis by NASCET criteria.    CT head: No evidence for acute intracranial hemorrhage or definite abnormal parenchymal enhancement.    Clinical correlation and further evaluation with MRI as warranted if patient compatible.      Electronically signed by: Miguel Valladares DO  Date:    05/21/2024  Time:    14:18               Narrative:    EXAMINATION:  CTA HEAD AND NECK (XPD)    CLINICAL HISTORY:  Neuro deficit, acute, stroke suspected;    TECHNIQUE:  5 mm axial images of the head pre and post contrast with 0.625 mm axial CTA images of the head neck post-contrast.  Coronal and sagittal MPR and MIP imaging was performed 100 ml of Omnipaque 350 contrast was injected intravenously.    COMPARISON:  CT thorax 05/03/2024    FINDINGS:  CT head with and  without contrast: There is no evidence for acute intracranial hemorrhage or  sulcal effacement.  The ventricles are normal in size and configuration without evidence for hydrocephalus.  There is no midline shift or mass effect.  Allowing for CT technique there is no abnormal parenchymal enhancement.  The visualized paranasal sinuses and mastoid air cells are clear.    CTA head:    Anterior circulation: The bilateral distal cervical, petrous, cavernous, and supraclinoid segments of the ICAs are patent without significant focal stenosis or aneurysm.  There is atherosclerotic plaquing cavernous and supraclinoid segments without significant focal stenosis.    The anterior middle cerebral arteries are patent without focal stenosis or aneurysm.    Posterior circulation: Distal vertebral arteries, basilar artery and posterior cerebral arteries are patent there are bilateral posterior communicating arteries.  No proximal high-grade stenosis or definite posterior circulation aneurysm    CTA neck: Atherosclerotic plaquing arch and origin great vessels without significant focal stenosis.    The origin of the vertebral arteries from the respective subclavian arteries are within normal limits.  The vertebral arteries are patent throughout their course without focal stenosis or occlusion.    Right carotid: The right common carotid artery, carotid bifurcation and extracranial portions of the internal carotid artery are patent without significant focal stenosis.    Left carotid: The left common carotid artery, carotid bifurcation and extracranial portions of the internal carotid arteries are patent without significant focal stenosis.    Atherosclerotic plaquing carotid bifurcations and proximal ICAs greater on the left with less than 50% proximal ICA stenosis by NASCET criteria..    Pharynx/larynx: Evaluation distorted by scatter artifact from dental metal and motion allowing for artifacts no focal lesion throughout the pharynx/larynx.  There is a probable developmental cleft within the midline base of the  occipital calvarium extending to the nasopharynx..    Glands: No focal parotid, submandibular or thyroid lesion.    No evidence for adenopathy throughout the neck by size criteria.    Right-sided Port-A-Cath extending to the SVC    Degenerative changes cervical spine without evidence for acute fracture or traumatic subluxation    Ill-defined opacities right lung apex which may represent scarring similar to prior CT thorax.                                       Medications   sodium chloride 0.9% flush 10 mL (has no administration in time range)   sodium chloride 0.9% bolus 500 mL 500 mL (has no administration in time range)   labetaloL injection 10 mg (has no administration in time range)   bisacodyL suppository 10 mg (has no administration in time range)   enoxaparin injection 40 mg (40 mg Subcutaneous Given 5/21/24 1749)   aspirin EC tablet 81 mg (81 mg Oral Given 5/22/24 0854)   clopidogreL tablet 75 mg (75 mg Oral Given 5/22/24 0854)   atorvastatin tablet 40 mg (40 mg Oral Given 5/22/24 0854)   ondansetron injection 4 mg (has no administration in time range)   polyethylene glycol packet 17 g (17 g Oral Not Given 5/22/24 0854)   acetaminophen tablet 650 mg (has no administration in time range)   melatonin tablet 6 mg (6 mg Oral Given 5/21/24 2127)   iohexoL (OMNIPAQUE 350) injection 80 mL (80 mLs Intravenous Given 5/21/24 1353)   clopidogreL tablet 300 mg (300 mg Oral Given 5/21/24 1749)     Medical Decision Making  79 yo F w/ PMHx of CAD s/p multiple stents, CHF, L bell's palsy, COPD, HTN, HLD, h/o lung cancer presenting to the ED for dizziness for the last couple of days. Today, around 10:30 AM she started experiencing a frontal headache, feeling like her body is pulling to the R, and intermittent slurring of her speech. Also notes bl blurry vision  VSS, nursing notes reviewed, on exam she has chronic L facial droop d/t bells palsy. Stroke code called in triage at 1:46 PM.   Differential diagnosis include but  are not limited to: CVA, TIA, electrolyte abnormality, hypoglycemia, [place] Palsy, seizure, migraine, syncope.     2:08 PM consulted and spoke w/ teleneurology who has evaluated the patient. They recommend to start her on Plavix as she is already on aspirin. Not TNK candidate.     Amount and/or Complexity of Data Reviewed  Labs: ordered. Decision-making details documented in ED Course.  Radiology: ordered. Decision-making details documented in ED Course.  ECG/medicine tests: ordered and independent interpretation performed. Decision-making details documented in ED Course.    Risk  Prescription drug management.    bringing in for further TIA evaluation. 10:30 AM had dysarthria, blurred vision, leaning to the right that resolved around the time of ER arrival at 1:30 PM. Stroke code. Vascular neurology states no TPA, Load with Plavix. Pt still asymptomatic. Spoke with Dr. Matta Neurology who states her and her partner feel all TIa's should be kept over night for further evaluation.         Scribe Attestation:   Scribe #1: I performed the above scribed service and the documentation accurately describes the services I performed. I attest to the accuracy of the note.                           I, Javy Quinonez, personally performed the services described in this documentation. All medical record entries made by the scribe were at my direction and in my presence. I have reviewed the chart and agree that the record reflects my personal performance and is accurate and complete.      Clinical Impression:  Final diagnoses:  [R29.818] Acute focal neurological deficit  [G45.9] TIA (transient ischemic attack) (Primary)          ED Disposition Condition    Observation Stable                Javy Quinonez MD  05/22/24 [id number]

## 2024-05-21 NOTE — ED TRIAGE NOTES
"Pt to the ED with complaints of dizziness x3 days. Pt also reports intermittent frontal headache, bilateral blurry vision and "stuttering" speech since 1030am, all which pt states have "gotten a lot better since it started". Pt noted to have left sided facial paralysis, reports hx of bell's palsy. Pt also reports chronic SOB due to COPD. Denies chest pain.      "

## 2024-05-21 NOTE — NURSING
[facility] [place]  Nurses Note -- 4 Eyes      5/21/2024       Skin assessed on: Transfer      [x] No Pressure Injuries Present    [x]Prevention Measures Documented    [] Yes LDA  for Pressure Injury Previously documented     [] Yes New Pressure Injury Discovered   [] LDA for New Pressure Injury Added      Attending RN:  Addy Freeman LPN     Second RN:  Geovanna OSORIO

## 2024-05-21 NOTE — TELEMEDICINE CONSULT
[facility]  Vascular Neurology  Comprehensive Stroke Center  TeleVascular Neurology Acute Consultation Note        Consult Information  Consult to Telemedicine - Acute Stroke  Consult performed by: Anirudh Garber MD  Consult ordered by: Pina Garcia MD          Consulting Provider: PINA GARCIA   Current Providers  No providers found    Patient Location:  [facility] EMERGENCY DEPARTMENT Emergency Department    Spoke hospital nurse at bedside with patient assisting consultant.  Patient information was obtained from patient.       Stroke Documentation  Acute Stroke Times   Last Known Normal Date: 05/21/24  Last Known Normal Time: 1030  Stroke Team Called Time: 1355  Stroke Team Arrival Time: 1400  CT Interpretation Time: 1400  Thrombolytic Therapy Recommended: No    NIH Scale:  1a. Level of Consciousness: 0-->Alert, keenly responsive  1b. LOC Questions: 0-->Answers both questions correctly  1c. LOC Commands: 0-->Performs both tasks correctly  2. Best Gaze: 0-->Normal  3. Visual: 0-->No visual loss  4. Facial Palsy: 3-->Complete paralysis of one or both sides (absence of facial movement in the upper and lower face) (left bells' history)  5a. Motor Arm, Left: 0-->No drift, limb holds 90 (or 45) degrees for full 10 secs  5b. Motor Arm, Right: 0-->No drift, limb holds 90 (or 45) degrees for full 10 secs  6a. Motor Leg, Left: 0-->No drift, leg holds 30 degree position for full 5 secs  6b. Motor Leg, Right: 0-->No drift, leg holds 30 degree position for full 5 secs  7. Limb Ataxia: 0-->Absent  8. Sensory: 0-->Normal, no sensory loss  9. Best Language: 0-->No aphasia, normal  10. Dysarthria: 0-->Normal  11. Extinction and Inattention (formerly Neglect): 0-->No abnormality  Total (NIH Stroke Scale): 3      Modified [place]:    [place] Coma Scale:     ABCD2 Score:    [id number] Score:    HAS -BLED Score:    ICH Score:    Hunt & Fraser Classification:      Blood pressure 112/73, pulse (!) 58,  "temperature 97.8 °F (36.6 °C), temperature source Oral, resp. rate 18, height 5' 2" (1.575 m), weight 70.8 kg (156 lb), SpO2 98%.    Van Negative    Medical Decision Making  HPI:  78 y.o. female was getting dizzy and had a headache and her left arm was hurting her . The inside of her body gets hot. Feels like her last heart attack. Earlier in the day was walking to theright side and stuttered a little bit.     Imaging personally reviewed & interpreted:  CT Brain: no evidence of early or subacute ischemic changes, intracerebral hemorrhage or hyperdense vessel signs  CTA Head & Neck: cervico-cephalic vasculature negative for any clear contributing large or medium vessel occlusion related to the patient's current presentation and no targets identified for acute or urgent reperfusion therapy       Assessment and plan:  Transient Neurological Symptoms - transient stuttering and veering to the right, currently with nonfocal symptoms.  Recommend:    - MRI brain w/o contrast to evaluate for presence and characterization of infarct  - continue aspirin 81 mg, load plavix 300 mg x 1 now, followed by daily aspirin 81 mg /  clopidogrel 75 mg daily   - Transothoracic echocardiogram  - lipid profile and A1c for evaluation of modifiable risk factors  - PT/OT/SLP eval and Rx  - Permissive HTN < 220/120 mmHg x 48-72h pending results of vessel imaging      Lytics recommendation: Thrombolytic therapy not recommended due to Patient back to neurological baseline  Thrombectomy recommendation: No; at this time symptoms not suggestive of large vessel occlusion  Placement recommendation: pending further studies        Current Outpatient Medications   Medication Instructions    acetaminophen (TYLENOL) 650 mg, Every 8 hours    albuterol (PROVENTIL) 2.5 mg /3 mL (0.083 %) nebulizer solution NEBULIZE 1 vial EVERY 6 HOURS AS NEEDED FOR WHEEZING    albuterol (VENTOLIN HFA) 90 mcg/actuation inhaler INHALE 2 PUFF(S) BY MOUTH EVERY 4 - 6 HOURS AS " NEEDED FOR SHORTNESS OF BREATH or WHEEZING    aluminum & magnesium hydroxide-simethicone (MYLANTA MAXIMUM STRENGTH) 400-400-40 mg/5 mL suspension 10 mLs, Oral, Every 6 hours PRN    aspirin (ECOTRIN) 81 mg, Oral, Daily    azelastine (ASTELIN) 137 mcg (0.1 %) nasal spray 1 puff in each nostril Nasally Twice a day for 30 days    benzonatate (TESSALON) 100 MG capsule TAKE ONE CAPSULE BY MOUTH EVERY 6 HOURS AS NEEDED FOR COUGH    carboxymethylcellulose (REFRESH PLUS) 0.5 % Dpet 1 drop, 3 times daily PRN    diclofenac (VOLTAREN) 50 mg, Oral, 2 times daily PRN    fluticasone propionate (FLONASE) 50 mcg/actuation nasal spray SHAKE WELL & USE TWO SPRAYS EACH NOSTRIL ONCE A DAY    heparin, porcine, PF, (HEPARIN FLUSH 100 UNITS/ML) 100 unit/mL Syrg No dose, route, or frequency recorded.    HYDROcodone-acetaminophen (NORCO) 7.5-325 mg per tablet 1 tablet, Every 6 hours PRN    isosorbide mononitrate (IMDUR) 30 MG 24 hr tablet TAKE 1 TABLET BY MOUTH EVERY DAY. Hold if SBP <120    isosorbide mononitrate (IMDUR) 30 mg, Oral, Daily, Hold if SBP <120    levocetirizine (XYZAL) 5 MG tablet TAKE 1 TABLET BY MOUTH EVERY DAY IN THE EVENING FOR ALLERGIES    meclizine (ANTIVERT) 25 mg tablet take 1 TABLET(S) BY MOUTH TWICE DAILY AS NEEDED for dizziness    methylPREDNISolone (MEDROL DOSEPACK) 4 mg tablet as directed Orally    metoprolol tartrate (LOPRESSOR) 25 mg, Oral, 2 times daily, FOR BLOOD PRESSURE    montelukast (SINGULAIR) 10 mg, Oral    nitroGLYCERIN (NITROSTAT) 0.4 MG SL tablet PLACE 1 TAB UNDER TONGUE EVERY 5 MINUTES x 3 DOSES AS NEEDED FOR CHEST PAIN. IF NOT RESOLVED CALL 911    omeprazole (PRILOSEC) 40 MG capsule Oral for 30    pantoprazole (PROTONIX) 40 mg, Oral, Daily    predniSONE (DELTASONE) 10 MG tablet TAKE 4 PILLS BY MOUTH FOR 3 DAYS, FOLLOWED BY 3 PILLS BY MOUTH FOR 3 DAYS, FOLLOWED BY 2 PILLS BY MOUTH FOR 3 DAYS. FOLLOWED BY 1 PILL BY MOUTH FOR 3 DAYS    rosuvastatin (CRESTOR) 10 mg, Oral, Daily    sodium chloride  (OCEAN) 0.65 % nasal spray 1 spray, Nasal, As needed (PRN)    TRELEGY ELLIPTA 100-62.5-25 mcg DsDv INHALE ONE PUFF INTO THE LUNGS ONCE A DAY     Past Medical History:   Diagnosis Date    Abnormal stress test 8/5/2020    Acute respiratory failure with hypoxia and hypercapnia 11/29/2017    Angina pectoris     Arthritis     Bell's palsy     left facial weakness    Breast cancer     RIGHT    CAD (coronary artery disease)     Cervical cancer     Chronic bronchitis     Chronic heart failure with preserved ejection fraction 8/5/2020    Chronic heart failure with preserved ejection fraction 8/5/2020    COPD (chronic obstructive pulmonary disease)     Dr. Sterling brown present     Emphysema of lung     H/O colonoscopy 06/2017    due for repeat colonsocopy in 6/2018    History of Bell's palsy     History of heart artery stent     Dr. Ortiz  x2 stents    Hyperlipidemia     Hypertension     Lung cancer     Myocardial infarction     SUNITHA (obstructive sleep apnea)     intolerant to mask    SUNITHA (obstructive sleep apnea)     intolerant to mask     Pneumonia     Pneumonia due to other staphylococcus     PUD (peptic ulcer disease)     Severe anemia 6/11/2018    Sleep apnea     Vaginal delivery     x1           ROS  Physical Exam  Past Medical History:   Diagnosis Date    Abnormal stress test 8/5/2020    Acute respiratory failure with hypoxia and hypercapnia 11/29/2017    Angina pectoris     Arthritis     Bell's palsy     left facial weakness    Breast cancer     RIGHT    CAD (coronary artery disease)     Cervical cancer     Chronic bronchitis     Chronic heart failure with preserved ejection fraction 8/5/2020    Chronic heart failure with preserved ejection fraction 8/5/2020    COPD (chronic obstructive pulmonary disease)     Dr. Sterling brown present     Emphysema of lung     H/O colonoscopy 06/2017    due for repeat colonsocopy in 6/2018    History of Bell's palsy     History of heart artery stent     Dr. Ortiz  x2  stents    Hyperlipidemia     Hypertension     Lung cancer     Myocardial infarction     SUNITHA (obstructive sleep apnea)     intolerant to mask    SUNITHA (obstructive sleep apnea)     intolerant to mask     Pneumonia     Pneumonia due to other staphylococcus     PUD (peptic ulcer disease)     Severe anemia 6/11/2018    Sleep apnea     Vaginal delivery     x1     Past Surgical History:   Procedure Laterality Date    ADENOIDECTOMY      BREAST BIOPSY Right     x3    BREAST LUMPECTOMY      BREAST SURGERY      lumpectomy right side     CERVIX SURGERY      cone    COLONOSCOPY N/A 3/17/2017    Procedure: COLONOSCOPY;  Surgeon: Julio Rudd MD;  Location: [facility] ENDO;  Service: Endoscopy;  Laterality: N/A;    COLONOSCOPY N/A 6/30/2017    Procedure: COLONOSCOPY;  Surgeon: Julio Rudd MD;  Location: [facility] ENDO;  Service: Endoscopy;  Laterality: N/A;    COLONOSCOPY N/A 11/28/2018    Procedure: COLONOSCOPY;  Surgeon: Nura Urbina MD;  Location: [facility] ENDO;  Service: Endoscopy;  Laterality: N/A;    COLONOSCOPY N/A 1/29/2019    Procedure: COLONOSCOPY;  Surgeon: Emmanuel Perez MD;  Location: [facility];  Service: Endoscopy;  Laterality: N/A;  confirmed appt-SP    COLONOSCOPY N/A 7/26/2022    Procedure: COLONOSCOPY;  Surgeon: James Healy MD;  Location: [facility];  Service: Endoscopy;  Laterality: N/A;  fully vaccinated -  mediport in chest -     CORONARY ANGIOPLASTY WITH STENT PLACEMENT      ESOPHAGEAL MANOMETRY WITH MEASUREMENT OF IMPEDANCE N/A 7/10/2023    Procedure: MANOMETRY, ESOPHAGUS, WITH IMPEDANCE MEASUREMENT;  Surgeon: Miller Phillips MD;  [facility]  Service: Gastroenterology;  Laterality: N/A;  pt on oxygen 2.5L  pt requested Tuesday only  5/31 referred by Dr. Miller Phillips/instr. mailed-st  7/3-r/s, updated instructions reviewed with pt in detail via phone, pt verbalized understanding-KPvt    ESOPHAGOGASTRODUODENOSCOPY N/A 1/25/2021    Procedure: EGD (ESOPHAGOGASTRODUODENOSCOPY);   Surgeon: Dmitry Gonzalez MD;  Location: [facility] ENDO;  Service: Endoscopy;  Laterality: N/A;  rapid test >50 miles -ml    ESOPHAGOGASTRODUODENOSCOPY N/A 11/8/2022    Procedure: EGD (ESOPHAGOGASTRODUODENOSCOPY);  Surgeon: Tapan Stevenson MD;  Location: [facility] ENDO;  Service: Endoscopy;  Laterality: N/A;  w/dilation  fully vaccinated, instructions mailed-Kpvt  11/4 pt called did not receive instructions, does not have portal or email, went over prep instructions and medication instructions with pt on the phone -LW    ESOPHAGOGASTRODUODENOSCOPY N/A 9/19/2023    Procedure: EGD (ESOPHAGOGASTRODUODENOSCOPY);  Surgeon: Miller Phillips MD;  Location: [facility] ENDO;  Service: Endoscopy;  Laterality: N/A;  botox injection  inst mailed    EYE SURGERY Bilateral 06/08/2018    cataract     LEFT HEART CATHETERIZATION Left 8/13/2020    Procedure: Left heart cath, rra, noon;  Surgeon: Guevara Skelton MD;  Location: [facility] CATH LAB;  Service: Cardiology;  Laterality: Left;  RN PREOP 8/7/2020---COVID NEGATIVE ON 8/12    PORTACATH PLACEMENT Right 01/2018    sweat glands axillary regions Bilateral     TONSILLECTOMY       Family History   Problem Relation Name Age of Onset    Cancer Mother          breast    Heart disease Mother      Breast cancer Mother      Cancer Father          lung-smoker     Cancer Sister          lung-smoker     Heart attack Sister      Cancer Maternal Grandmother      Heart disease Maternal Grandmother      Cancer Maternal Grandfather      Heart disease Maternal Grandfather      Cancer Paternal Grandmother      Cancer Paternal Grandfather      Cancer Sister          mets not sure where it started     COPD Sister      Heart disease Sister      Kidney cancer Son      Colon cancer Neg Hx      Esophageal cancer Neg Hx         Diagnoses  No problems updated.    Anirudh Garber MD      Emergent/Acute neurological consultation requested by spoke provider due to critical concerns for possible cerebrovascular event that could result  in permanent loss of neurologic/bodily function, severe disability or death of this patient.  Immediate/timely evaluation by a highly prepared expert is paramount for optimal outcomes  High risk for neurological deterioration if not properly diagnosed  High risk for neurological deterioration if not treated promplty/as soon as possible  Complex diagnostic evaluation may be required (advanced imaging)  High risk treatment options (thrombolytics and/or thrombectomy)    Patient care was coordinated with spoke provider, including but not limted to    Discussing likely diagnosis/etiology of symptoms  Making recommendations for further diagnostic studies  Making recommendations for intravenous thrombolytics or other advanced therapies  Making recommendations for disposition (admission/transfer for higher level of care)

## 2024-05-21 NOTE — SUBJECTIVE & OBJECTIVE
HPI:  78 y.o. female was getting dizzy and had a headache and her left arm was hurting her . The inside of her body gets hot. Feels like her last heart attack. Earlier in the day was walking to theright side and stuttered a little bit.     Imaging personally reviewed & interpreted:  CT Brain: no evidence of early or subacute ischemic changes, intracerebral hemorrhage or hyperdense vessel signs  CTA Head & Neck: cervico-cephalic vasculature negative for any clear contributing large or medium vessel occlusion related to the patient's current presentation and no targets identified for acute or urgent reperfusion therapy       Assessment and plan:  Transient Neurological Symptoms - transient stuttering and veering to the right, currently with nonfocal symptoms.  Recommend:    - MRI brain w/o contrast to evaluate for presence and characterization of infarct  - continue aspirin 81 mg, load plavix 300 mg x 1 now, followed by daily aspirin 81 mg /  clopidogrel 75 mg daily   - Transothoracic echocardiogram  - lipid profile and A1c for evaluation of modifiable risk factors  - PT/OT/SLP eval and Rx  - Permissive HTN < 220/120 mmHg x 48-72h pending results of vessel imaging      Lytics recommendation: Thrombolytic therapy not recommended due to Patient back to neurological baseline  Thrombectomy recommendation: No; at this time symptoms not suggestive of large vessel occlusion  Placement recommendation: pending further studies        Current Outpatient Medications   Medication Instructions    acetaminophen (TYLENOL) 650 mg, Every 8 hours    albuterol (PROVENTIL) 2.5 mg /3 mL (0.083 %) nebulizer solution NEBULIZE 1 vial EVERY 6 HOURS AS NEEDED FOR WHEEZING    albuterol (VENTOLIN HFA) 90 mcg/actuation inhaler INHALE 2 PUFF(S) BY MOUTH EVERY 4 - 6 HOURS AS NEEDED FOR SHORTNESS OF BREATH or WHEEZING    aluminum & magnesium hydroxide-simethicone (MYLANTA MAXIMUM STRENGTH) 400-400-40 mg/5 mL suspension 10 mLs, Oral, Every 6 hours PRN     aspirin (ECOTRIN) 81 mg, Oral, Daily    azelastine (ASTELIN) 137 mcg (0.1 %) nasal spray 1 puff in each nostril Nasally Twice a day for 30 days    benzonatate (TESSALON) 100 MG capsule TAKE ONE CAPSULE BY MOUTH EVERY 6 HOURS AS NEEDED FOR COUGH    carboxymethylcellulose (REFRESH PLUS) 0.5 % Dpet 1 drop, 3 times daily PRN    diclofenac (VOLTAREN) 50 mg, Oral, 2 times daily PRN    fluticasone propionate (FLONASE) 50 mcg/actuation nasal spray SHAKE WELL & USE TWO SPRAYS EACH NOSTRIL ONCE A DAY    heparin, porcine, PF, (HEPARIN FLUSH 100 UNITS/ML) 100 unit/mL Syrg No dose, route, or frequency recorded.    HYDROcodone-acetaminophen (NORCO) 7.5-325 mg per tablet 1 tablet, Every 6 hours PRN    isosorbide mononitrate (IMDUR) 30 MG 24 hr tablet TAKE 1 TABLET BY MOUTH EVERY DAY. Hold if SBP <120    isosorbide mononitrate (IMDUR) 30 mg, Oral, Daily, Hold if SBP <120    levocetirizine (XYZAL) 5 MG tablet TAKE 1 TABLET BY MOUTH EVERY DAY IN THE EVENING FOR ALLERGIES    meclizine (ANTIVERT) 25 mg tablet take 1 TABLET(S) BY MOUTH TWICE DAILY AS NEEDED for dizziness    methylPREDNISolone (MEDROL DOSEPACK) 4 mg tablet as directed Orally    metoprolol tartrate (LOPRESSOR) 25 mg, Oral, 2 times daily, FOR BLOOD PRESSURE    montelukast (SINGULAIR) 10 mg, Oral    nitroGLYCERIN (NITROSTAT) 0.4 MG SL tablet PLACE 1 TAB UNDER TONGUE EVERY 5 MINUTES x 3 DOSES AS NEEDED FOR CHEST PAIN. IF NOT RESOLVED CALL 911    omeprazole (PRILOSEC) 40 MG capsule Oral for 30    pantoprazole (PROTONIX) 40 mg, Oral, Daily    predniSONE (DELTASONE) 10 MG tablet TAKE 4 PILLS BY MOUTH FOR 3 DAYS, FOLLOWED BY 3 PILLS BY MOUTH FOR 3 DAYS, FOLLOWED BY 2 PILLS BY MOUTH FOR 3 DAYS. FOLLOWED BY 1 PILL BY MOUTH FOR 3 DAYS    rosuvastatin (CRESTOR) 10 mg, Oral, Daily    sodium chloride (OCEAN) 0.65 % nasal spray 1 spray, Nasal, As needed (PRN)    TRELEGY ELLIPTA 100-62.5-25 mcg DsDv INHALE ONE PUFF INTO THE LUNGS ONCE A DAY     Past Medical History:   Diagnosis Date     Abnormal stress test 8/5/2020    Acute respiratory failure with hypoxia and hypercapnia 11/29/2017    Angina pectoris     Arthritis     Bell's palsy     left facial weakness    Breast cancer     RIGHT    CAD (coronary artery disease)     Cervical cancer     Chronic bronchitis     Chronic heart failure with preserved ejection fraction 8/5/2020    Chronic heart failure with preserved ejection fraction 8/5/2020    COPD (chronic obstructive pulmonary disease)     Dr. Katz    Dental bridge present     Emphysema of lung     H/O colonoscopy 06/2017    due for repeat colonsocopy in 6/2018    History of Bell's palsy     History of heart artery stent     Dr. Ortiz  x2 stents    Hyperlipidemia     Hypertension     Lung cancer     Myocardial infarction     SUNITHA (obstructive sleep apnea)     intolerant to mask    SUNITHA (obstructive sleep apnea)     intolerant to mask     Pneumonia     Pneumonia due to other staphylococcus     PUD (peptic ulcer disease)     Severe anemia 6/11/2018    Sleep apnea     Vaginal delivery     x1

## 2024-05-21 NOTE — NURSING
Pt arrived to the unit via stretcher. Pt was able to walk to the bed. /68 p.63. NAD noted. Pt left in bed, in the lowest position call light in reach. All questions answered at this time.

## 2024-05-21 NOTE — ADMISSIONCARE
AdmissionCare    Guideline: Transient Ischemic Attack (TIA) - OBS, Observation    Based on the indications selected for the patient, the bed status of Observation was determined to be MET    The following indications were selected as present at the time of evaluation of the patient:      - Appropriate evaluation (brain MRI, echocardiogram, carotid imaging) cannot be completed in emergency department time frame (3 to 4 hours).    AdmissionCare documentation entered by: Sheridan Mckeon    [employer], 27th edition, Copyright © 2023 [employer], [facility] All Rights Reserved.  [id number]:00

## 2024-05-21 NOTE — HPI
78 y.o. female with CAD, COPD, and cancer of the cervix, lung, breast, and lymph nodes presents with a complaint of dizziness.  Acute onset, duration 3 days, associated with frontal headache, slurred speech, blurry vision and feeling her body pulling to the right.  Left facial droop is chronic due to Bell's palsy and unchanged.  Denies fever, chills, cough, chest pain, syncope.  In the ED, workup unremarkable for acute abnormality.  MRI brain pending.  Placed in observation.

## 2024-05-22 VITALS
RESPIRATION RATE: 18 BRPM | HEIGHT: 62 IN | SYSTOLIC BLOOD PRESSURE: 134 MMHG | DIASTOLIC BLOOD PRESSURE: 58 MMHG | TEMPERATURE: 99 F | BODY MASS INDEX: 29.17 KG/M2 | HEART RATE: 74 BPM | WEIGHT: 158.5 LBS | OXYGEN SATURATION: 94 %

## 2024-05-22 LAB
ALBUMIN SERPL BCP-MCNC: 3.5 G/DL (ref 3.5–5.2)
ALP SERPL-CCNC: 40 U/L (ref 55–135)
ALT SERPL W/O P-5'-P-CCNC: 13 U/L (ref 10–44)
ANION GAP SERPL CALC-SCNC: 14 MMOL/L (ref 8–16)
APTT PPP: 26.5 SEC (ref 21–32)
ASCENDING AORTA: 3.3 CM
AST SERPL-CCNC: 15 U/L (ref 10–40)
AV INDEX (PROSTH): 0.42
AV MEAN GRADIENT: 22 MMHG
AV PEAK GRADIENT: 35 MMHG
AV VALVE AREA BY VELOCITY RATIO: 1.21 CM²
AV VALVE AREA: 1.22 CM²
AV VELOCITY RATIO: 0.41
BASOPHILS # BLD AUTO: 0.06 K/UL (ref 0–0.2)
BASOPHILS NFR BLD: 0.8 % (ref 0–1.9)
BILIRUB SERPL-MCNC: 0.5 MG/DL (ref 0.1–1)
BSA FOR ECHO PROCEDURE: 1.77 M2
BUN SERPL-MCNC: 14 MG/DL (ref 8–23)
CALCIUM SERPL-MCNC: 9.5 MG/DL (ref 8.7–10.5)
CHLORIDE SERPL-SCNC: 105 MMOL/L (ref 95–110)
CO2 SERPL-SCNC: 22 MMOL/L (ref 23–29)
CREAT SERPL-MCNC: 0.8 MG/DL (ref 0.5–1.4)
CV ECHO LV RWT: 0.36 CM
DIFFERENTIAL METHOD BLD: ABNORMAL
DOP CALC AO PEAK VEL: 2.96 M/S
DOP CALC AO VTI: 62.9 CM
DOP CALC LVOT AREA: 2.9 CM2
DOP CALC LVOT DIAMETER: 1.93 CM
DOP CALC LVOT PEAK VEL: 1.22 M/S
DOP CALC LVOT STROKE VOLUME: 76.61 CM3
DOP CALCLVOT PEAK VEL VTI: 26.2 CM
E WAVE DECELERATION TIME: 211.32 MSEC
E/A RATIO: 0.76
E/E' RATIO: 16.55 M/S
ECHO LV POSTERIOR WALL: 0.84 CM (ref 0.6–1.1)
EOSINOPHIL # BLD AUTO: 0.3 K/UL (ref 0–0.5)
EOSINOPHIL NFR BLD: 3.8 % (ref 0–8)
ERYTHROCYTE [DISTWIDTH] IN BLOOD BY AUTOMATED COUNT: 13.6 % (ref 11.5–14.5)
EST. GFR  (NO RACE VARIABLE): >60 ML/MIN/1.73 M^2
ESTIMATED AVG GLUCOSE: 117 MG/DL (ref 68–131)
FRACTIONAL SHORTENING: 25 % (ref 28–44)
GLUCOSE SERPL-MCNC: 97 MG/DL (ref 70–110)
HBA1C MFR BLD: 5.7 % (ref 4–5.6)
HCT VFR BLD AUTO: 40.5 % (ref 37–48.5)
HGB BLD-MCNC: 12.6 G/DL (ref 12–16)
IMM GRANULOCYTES # BLD AUTO: 0.04 K/UL (ref 0–0.04)
IMM GRANULOCYTES NFR BLD AUTO: 0.5 % (ref 0–0.5)
INR PPP: 1 (ref 0.8–1.2)
INTERVENTRICULAR SEPTUM: 0.75 CM (ref 0.6–1.1)
IVC DIAMETER: 2.08 CM
IVRT: 97.05 MSEC
LA MAJOR: 5.57 CM
LA MINOR: 5.13 CM
LA WIDTH: 3.6 CM
LEFT ATRIUM SIZE: 4.37 CM
LEFT ATRIUM VOLUME INDEX: 41.3 ML/M2
LEFT ATRIUM VOLUME: 71.42 CM3
LEFT INTERNAL DIMENSION IN SYSTOLE: 3.49 CM (ref 2.1–4)
LEFT VENTRICLE DIASTOLIC VOLUME INDEX: 57.94 ML/M2
LEFT VENTRICLE DIASTOLIC VOLUME: 100.23 ML
LEFT VENTRICLE MASS INDEX: 69 G/M2
LEFT VENTRICLE SYSTOLIC VOLUME INDEX: 29.2 ML/M2
LEFT VENTRICLE SYSTOLIC VOLUME: 50.5 ML
LEFT VENTRICULAR INTERNAL DIMENSION IN DIASTOLE: 4.66 CM (ref 3.5–6)
LEFT VENTRICULAR MASS: 119.53 G
LV LATERAL E/E' RATIO: 13 M/S
LV SEPTAL E/E' RATIO: 22.75 M/S
LVOT MG: 3.66 MMHG
LVOT MV: 0.9 CM/S
LYMPHOCYTES # BLD AUTO: 1 K/UL (ref 1–4.8)
LYMPHOCYTES NFR BLD: 14 % (ref 18–48)
MAGNESIUM SERPL-MCNC: 1.8 MG/DL (ref 1.6–2.6)
MCH RBC QN AUTO: 29.7 PG (ref 27–31)
MCHC RBC AUTO-ENTMCNC: 31.1 G/DL (ref 32–36)
MCV RBC AUTO: 96 FL (ref 82–98)
MONOCYTES # BLD AUTO: 0.7 K/UL (ref 0.3–1)
MONOCYTES NFR BLD: 8.8 % (ref 4–15)
MV PEAK A VEL: 1.2 M/S
MV PEAK E VEL: 0.91 M/S
MV STENOSIS PRESSURE HALF TIME: 61.28 MS
MV VALVE AREA P 1/2 METHOD: 3.59 CM2
NEUTROPHILS # BLD AUTO: 5.3 K/UL (ref 1.8–7.7)
NEUTROPHILS NFR BLD: 72.1 % (ref 38–73)
NRBC BLD-RTO: 0 /100 WBC
OHS CV RV/LV RATIO: 0.58 CM
OHS QRS DURATION: 80 MS
OHS QTC CALCULATION: 437 MS
PHOSPHATE SERPL-MCNC: 3.8 MG/DL (ref 2.7–4.5)
PISA TR MAX VEL: 2.5 M/S
PLATELET # BLD AUTO: 145 K/UL (ref 150–450)
PMV BLD AUTO: 9.9 FL (ref 9.2–12.9)
POCT GLUCOSE: 104 MG/DL (ref 70–110)
POTASSIUM SERPL-SCNC: 3.7 MMOL/L (ref 3.5–5.1)
PROT SERPL-MCNC: 6 G/DL (ref 6–8.4)
PROTHROMBIN TIME: 10.5 SEC (ref 9–12.5)
PULM VEIN S/D RATIO: 1.05
PV PEAK D VEL: 0.59 M/S
PV PEAK GRADIENT: 8 MMHG
PV PEAK S VEL: 0.62 M/S
PV PEAK VELOCITY: 1.39 M/S
RA MAJOR: 4.92 CM
RA PRESSURE ESTIMATED: 8 MMHG
RA WIDTH: 3.5 CM
RBC # BLD AUTO: 4.24 M/UL (ref 4–5.4)
RIGHT VENTRICULAR END-DIASTOLIC DIMENSION: 2.71 CM
RV TB RVSP: 11 MMHG
RV TISSUE DOPPLER FREE WALL SYSTOLIC VELOCITY 1 (APICAL 4 CHAMBER VIEW): 15.46 CM/S
SINUS: 2.83 CM
SODIUM SERPL-SCNC: 141 MMOL/L (ref 136–145)
STJ: 2.51 CM
TDI LATERAL: 0.07 M/S
TDI SEPTAL: 0.04 M/S
TDI: 0.06 M/S
TR MAX PG: 25 MMHG
TRICUSPID ANNULAR PLANE SYSTOLIC EXCURSION: 2.46 CM
TV PEAK GRADIENT: 0 MMHG
TV REST PULMONARY ARTERY PRESSURE: 33 MMHG
WBC # BLD AUTO: 7.37 K/UL (ref 3.9–12.7)
Z-SCORE OF LEFT VENTRICULAR DIMENSION IN END DIASTOLE: -0.28
Z-SCORE OF LEFT VENTRICULAR DIMENSION IN END SYSTOLE: 1.28

## 2024-05-22 PROCEDURE — 94761 N-INVAS EAR/PLS OXIMETRY MLT: CPT | Mod: HCNC

## 2024-05-22 PROCEDURE — 25000003 PHARM REV CODE 250: Mod: HCNC | Performed by: HOSPITALIST

## 2024-05-22 PROCEDURE — 83735 ASSAY OF MAGNESIUM: CPT | Mod: HCNC | Performed by: HOSPITALIST

## 2024-05-22 PROCEDURE — 36415 COLL VENOUS BLD VENIPUNCTURE: CPT | Mod: HCNC | Performed by: HOSPITALIST

## 2024-05-22 PROCEDURE — 85025 COMPLETE CBC W/AUTO DIFF WBC: CPT | Mod: HCNC | Performed by: HOSPITALIST

## 2024-05-22 PROCEDURE — 84100 ASSAY OF PHOSPHORUS: CPT | Mod: HCNC | Performed by: HOSPITALIST

## 2024-05-22 PROCEDURE — 85730 THROMBOPLASTIN TIME PARTIAL: CPT | Mod: HCNC | Performed by: HOSPITALIST

## 2024-05-22 PROCEDURE — 92610 EVALUATE SWALLOWING FUNCTION: CPT | Mod: HCNC

## 2024-05-22 PROCEDURE — G0378 HOSPITAL OBSERVATION PER HR: HCPCS | Mod: HCNC

## 2024-05-22 PROCEDURE — 80053 COMPREHEN METABOLIC PANEL: CPT | Mod: HCNC | Performed by: HOSPITALIST

## 2024-05-22 PROCEDURE — 85610 PROTHROMBIN TIME: CPT | Mod: HCNC | Performed by: HOSPITALIST

## 2024-05-22 PROCEDURE — 97165 OT EVAL LOW COMPLEX 30 MIN: CPT | Mod: HCNC

## 2024-05-22 PROCEDURE — 99215 OFFICE O/P EST HI 40 MIN: CPT | Mod: HCNC,,, | Performed by: INTERNAL MEDICINE

## 2024-05-22 PROCEDURE — 97161 PT EVAL LOW COMPLEX 20 MIN: CPT | Mod: HCNC

## 2024-05-22 PROCEDURE — 83036 HEMOGLOBIN GLYCOSYLATED A1C: CPT | Mod: HCNC | Performed by: HOSPITALIST

## 2024-05-22 RX ORDER — CLOPIDOGREL BISULFATE 75 MG/1
75 TABLET ORAL DAILY
Qty: 20 TABLET | Refills: 0 | Status: SHIPPED | OUTPATIENT
Start: 2024-05-23 | End: 2024-06-12

## 2024-05-22 RX ORDER — ATORVASTATIN CALCIUM 40 MG/1
40 TABLET, FILM COATED ORAL DAILY
Qty: 90 TABLET | Refills: 3 | Status: SHIPPED | OUTPATIENT
Start: 2024-05-22 | End: 2025-05-22

## 2024-05-22 RX ADMIN — CLOPIDOGREL BISULFATE 75 MG: 75 TABLET ORAL at 08:05

## 2024-05-22 RX ADMIN — ASPIRIN 81 MG: 81 TABLET, COATED ORAL at 08:05

## 2024-05-22 RX ADMIN — ATORVASTATIN CALCIUM 40 MG: 40 TABLET, FILM COATED ORAL at 08:05

## 2024-05-22 NOTE — ASSESSMENT & PLAN NOTE
Neuro recs:   MRI brain w/o contrast to evaluate for presence and characterization of infarct  - continue aspirin 81 mg, load plavix 300 mg x 1 now, followed by daily aspirin 81 mg /  clopidogrel 75 mg daily   - Transothoracic echocardiogram  - lipid profile and A1c for evaluation of modifiable risk factors  - PT/OT/SLP eval and Rx  - Permissive HTN < 220/120 mmHg x 48-72h pending results of vessel imaging      Antithrombotics for secondary stroke prevention: Antiplatelets: Aspirin: 81 mg daily  Clopidogrel: 300 mg loading dose x 1, now  Clopidogrel: 75 mg daily    Statins for secondary stroke prevention and hyperlipidemia, if present:   Statins: Atorvastatin- 40 mg daily    Aggressive risk factor modification: HTN, CAD     Rehab efforts: The patient has been evaluated by a stroke team provider and the therapy needs have been fully considered based off the presenting complaints and exam findings. The following therapy evaluations are needed: PT evaluate and treat, OT evaluate and treat, SLP evaluate and treat    Diagnostics ordered/pending: CT scan of head without contrast to asses brain parenchyma, CTA Head to assess vasculature , CTA Neck/Arch to assess vasculature, HgbA1C to assess blood glucose levels, Lipid Profile to assess cholesterol levels, MRI head without contrast to assess brain parenchyma, TTE to assess cardiac function/status , TSH to assess thyroid function    VTE prophylaxis: Enoxaparin 40 mg SQ every 24 hours    BP parameters: Infarct: No intervention, SBP <220

## 2024-05-22 NOTE — NURSING
Patient back to unit from scheduled testing. Cardiac monitoring continued and patient in NAD with no complaints of pain. QHS medications administered and patient made comfortable.

## 2024-05-22 NOTE — H&P
"  [facility] Medicine  History & Physical    Patient Name: Ade Castellano  MRN: [id number]  Patient Class: OP- Observation  Admission Date: 5/21/2024  Attending Physician: Bharti Farah, *   Primary Care Provider: Jeannine Huitron MD         Patient information was obtained from patient, past medical records, and ER records.     Subjective:     Principal Problem:TIA (transient ischemic attack)    Chief Complaint:   Chief Complaint   Patient presents with    Dizziness     Pt w/ a history MI (2006-stents), cervical, lung, breast and lymph nodes, now in remission, COPD. No history of HTN.  Pt presents w/  c/o dizziness over the last few days w/ blurry vision, frontal HA since today.  Pt also c/o "pulling to the right" and "slurring my words" on/off today since about 10:30.  Denies CP but does have SOB and left upper arm pain. Denies blood thinners. Dr Quinonez at triage evaluating pt for stroke.        HPI: 78 y.o. female with CAD, COPD, and cancer of the cervix, lung, breast, and lymph nodes presents with a complaint of dizziness.  Acute onset, duration 3 days, associated with frontal headache, slurred speech, blurry vision and feeling her body pulling to the right.  Left facial droop is chronic due to Bell's palsy and unchanged.  Denies fever, chills, cough, chest pain, syncope.  In the ED, workup unremarkable for acute abnormality.  MRI brain pending.  Placed in observation.    Past Medical History:   Diagnosis Date    Abnormal stress test 8/5/2020    Acute respiratory failure with hypoxia and hypercapnia 11/29/2017    Angina pectoris     Arthritis     Bell's palsy     left facial weakness    Breast cancer     RIGHT    CAD (coronary artery disease)     Cervical cancer     Chronic bronchitis     Chronic heart failure with preserved ejection fraction 8/5/2020    Chronic heart failure with preserved ejection fraction 8/5/2020    COPD (chronic obstructive pulmonary disease)     Dr." Katz    Dental bridge present     Emphysema of lung     H/O colonoscopy 06/2017    due for repeat colonsocopy in 6/2018    History of Bell's palsy     History of heart artery stent     Dr. Ortiz  x2 stents    Hyperlipidemia     Hypertension     Lung cancer     Myocardial infarction     SUNITHA (obstructive sleep apnea)     intolerant to mask    SUNITHA (obstructive sleep apnea)     intolerant to mask     Pneumonia     Pneumonia due to other staphylococcus     PUD (peptic ulcer disease)     Severe anemia 6/11/2018    Sleep apnea     Vaginal delivery     x1       Past Surgical History:   Procedure Laterality Date    ADENOIDECTOMY      BREAST BIOPSY Right     x3    BREAST LUMPECTOMY      BREAST SURGERY      lumpectomy right side     CERVIX SURGERY      cone    COLONOSCOPY N/A 3/17/2017    Procedure: COLONOSCOPY;  Surgeon: Julio Rudd MD;  Location: [facility];  Service: Endoscopy;  Laterality: N/A;    COLONOSCOPY N/A 6/30/2017    Procedure: COLONOSCOPY;  Surgeon: Julio Rudd MD;  Location: [facility];  Service: Endoscopy;  Laterality: N/A;    COLONOSCOPY N/A 11/28/2018    Procedure: COLONOSCOPY;  Surgeon: Nura Urbina MD;  Location: [facility];  Service: Endoscopy;  Laterality: N/A;    COLONOSCOPY N/A 1/29/2019    Procedure: COLONOSCOPY;  Surgeon: Emmanuel Perez MD;  Location: [facility];  Service: Endoscopy;  Laterality: N/A;  confirmed appt-SP    COLONOSCOPY N/A 7/26/2022    Procedure: COLONOSCOPY;  Surgeon: James Healy MD;  Location: [facility];  Service: Endoscopy;  Laterality: N/A;  fully vaccinated -sm  mediport in chest -     CORONARY ANGIOPLASTY WITH STENT PLACEMENT      ESOPHAGEAL MANOMETRY WITH MEASUREMENT OF IMPEDANCE N/A 7/10/2023    Procedure: MANOMETRY, ESOPHAGUS, WITH IMPEDANCE MEASUREMENT;  Surgeon: Miller Phillips MD;  Location: [facility] (4TH FLR);  Service: Gastroenterology;  Laterality: N/A;  pt on oxygen 2.5L  pt requested Tuesday only  5/31 referred by Dr. Miller Phillips/instr.  mailed-st  7/3-r/s, updated instructions reviewed with pt in detail via phone, pt verbalized understanding-KPvt    ESOPHAGOGASTRODUODENOSCOPY N/A 1/25/2021    Procedure: EGD (ESOPHAGOGASTRODUODENOSCOPY);  Surgeon: Dmitry Gonzalez MD;  Location: [facility] ENDO;  Service: Endoscopy;  Laterality: N/A;  rapid test >50 miles -ml    ESOPHAGOGASTRODUODENOSCOPY N/A 11/8/2022    Procedure: EGD (ESOPHAGOGASTRODUODENOSCOPY);  Surgeon: Tapan Stevenson MD;  Location: [facility] ENDO;  Service: Endoscopy;  Laterality: N/A;  w/dilation  fully vaccinated, instructions mailed-\A [facility] [place] [facility]\""  11/4 pt called did not receive instructions, does not have portal or email, went over prep instructions and medication instructions with pt on the phone -LW    ESOPHAGOGASTRODUODENOSCOPY N/A 9/19/2023    Procedure: EGD (ESOPHAGOGASTRODUODENOSCOPY);  Surgeon: Miller Phillips MD;  Location: [facility] ENDO;  Service: Endoscopy;  Laterality: N/A;  botox injection  inst mailed    EYE SURGERY Bilateral 06/08/2018    cataract     LEFT HEART CATHETERIZATION Left 8/13/2020    Procedure: Left heart cath, rra, noon;  Surgeon: Guevara Skelton MD;  Location: [facility] CATH LAB;  Service: Cardiology;  Laterality: Left;  RN PREOP 8/7/2020---COVID NEGATIVE ON 8/12    PORTACATH PLACEMENT Right 01/2018    sweat glands axillary regions Bilateral     TONSILLECTOMY         Review of patient's allergies indicates:  No Known Allergies    No current facility-administered medications on file prior to encounter.     Current Outpatient Medications on File Prior to Encounter   Medication Sig    acetaminophen (TYLENOL) 650 MG TbSR Take 650 mg by mouth every 8 (eight) hours. (Patient not taking: Reported on 4/15/2024)    albuterol (PROVENTIL) 2.5 mg /3 mL (0.083 %) nebulizer solution NEBULIZE 1 vial EVERY 6 HOURS AS NEEDED FOR WHEEZING    aluminum & magnesium hydroxide-simethicone (MYLANTA MAXIMUM STRENGTH) 400-400-40 mg/5 mL suspension Take 10 mLs by mouth every 6 (six) hours as needed for Indigestion.     aspirin (ECOTRIN) 81 MG EC tablet Take 81 mg by mouth once daily.     diclofenac (VOLTAREN) 50 MG EC tablet Take 50 mg by mouth 2 (two) times daily.    fluticasone propionate (FLONASE) 50 mcg/actuation nasal spray SHAKE WELL & USE TWO SPRAYS EACH NOSTRIL ONCE A DAY    isosorbide mononitrate (IMDUR) 30 MG 24 hr tablet TAKE 1 TABLET BY MOUTH EVERY DAY. Hold if SBP <120    levocetirizine (XYZAL) 5 MG tablet TAKE 1 TABLET BY MOUTH EVERY DAY IN THE EVENING FOR ALLERGIES    metoprolol tartrate (LOPRESSOR) 25 MG tablet Take 1 tablet (25 mg total) by mouth 2 (two) times daily. FOR BLOOD PRESSURE    nitroGLYCERIN (NITROSTAT) 0.4 MG SL tablet PLACE 1 TAB UNDER TONGUE EVERY 5 MINUTES x 3 DOSES AS NEEDED FOR CHEST PAIN. IF NOT RESOLVED CALL 911    pantoprazole (PROTONIX) 40 MG tablet Take 1 tablet (40 mg total) by mouth once daily.    rosuvastatin (CRESTOR) 10 MG tablet Take 1 tablet (10 mg total) by mouth once daily.    TRELEGY ELLIPTA 100-62.5-25 mcg DsDv INHALE ONE PUFF INTO THE LUNGS ONCE A DAY    [DISCONTINUED] albuterol (VENTOLIN HFA) 90 mcg/actuation inhaler INHALE 2 PUFF(S) BY MOUTH EVERY 4 - 6 HOURS AS NEEDED FOR SHORTNESS OF BREATH or WHEEZING    [DISCONTINUED] azelastine (ASTELIN) 137 mcg (0.1 %) nasal spray 1 puff in each nostril Nasally Twice a day for 30 days    [DISCONTINUED] benzonatate (TESSALON) 100 MG capsule TAKE ONE CAPSULE BY MOUTH EVERY 6 HOURS AS NEEDED FOR COUGH (Patient not taking: Reported on 2/29/2024)    [DISCONTINUED] carboxymethylcellulose (REFRESH PLUS) 0.5 % Dpet 1 drop 3 (three) times daily as needed.    [DISCONTINUED] diclofenac (VOLTAREN) 50 MG EC tablet Take 1 tablet (50 mg total) by mouth 2 (two) times daily as needed.    [DISCONTINUED] heparin, porcine, PF, (HEPARIN FLUSH 100 UNITS/ML) 100 unit/mL Syrg     [DISCONTINUED] HYDROcodone-acetaminophen (NORCO) 7.5-325 mg per tablet Take 1 tablet by mouth every 6 (six) hours as needed. (Patient not taking: Reported on 4/15/2024)    [DISCONTINUED]  isosorbide mononitrate (IMDUR) 30 MG 24 hr tablet Take 1 tablet (30 mg total) by mouth once daily. Hold if SBP <120    [DISCONTINUED] meclizine (ANTIVERT) 25 mg tablet take 1 TABLET(S) BY MOUTH TWICE DAILY AS NEEDED for dizziness (Patient not taking: Reported on 2024)    [DISCONTINUED] methylPREDNISolone (MEDROL DOSEPACK) 4 mg tablet as directed Orally (Patient not taking: Reported on 2024)    [DISCONTINUED] montelukast (SINGULAIR) 10 mg tablet TAKE 1 TABLET BY MOUTH EVERY DAY (Patient not taking: Reported on 4/15/2024)    [DISCONTINUED] omeprazole (PRILOSEC) 40 MG capsule Oral for 30    [DISCONTINUED] predniSONE (DELTASONE) 10 MG tablet TAKE 4 PILLS BY MOUTH FOR 3 DAYS, FOLLOWED BY 3 PILLS BY MOUTH FOR 3 DAYS, FOLLOWED BY 2 PILLS BY MOUTH FOR 3 DAYS. FOLLOWED BY 1 PILL BY MOUTH FOR 3 DAYS (Patient not taking: Reported on 2024)    [DISCONTINUED] sodium chloride (OCEAN) 0.65 % nasal spray 1 spray by Nasal route as needed for Congestion.     Family History       Problem Relation (Age of Onset)    Breast cancer Mother    COPD Sister    Cancer Mother, Father, Sister, Maternal Grandmother, Maternal Grandfather, Paternal Grandmother, Paternal Grandfather, Sister    Heart attack Sister    Heart disease Mother, Maternal Grandmother, Maternal Grandfather, Sister    Kidney cancer Son          Tobacco Use    Smoking status: Former     Current packs/day: 0.00     Average packs/day: 0.3 packs/day for 50.0 years (12.5 ttl pk-yrs)     Types: Cigarettes     Start date: 1967     Quit date: 2017     Years since quittin.5    Smokeless tobacco: Never    Tobacco comments:     7 years since smoked    Substance and Sexual Activity    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   HENT:  Negative for congestion and rhinorrhea.    Eyes:  Positive for visual disturbance. Negative for  photophobia.   Respiratory:  Negative for cough and shortness of breath.    Cardiovascular:  Negative for chest pain, palpitations and leg swelling.   Gastrointestinal:  Negative for abdominal pain, diarrhea, nausea and vomiting.   Genitourinary:  Negative for dysuria, frequency and urgency.   Skin:  Negative for pallor, rash and wound.   Neurological:  Positive for dizziness, facial asymmetry (chronic), speech difficulty and headaches. Negative for light-headedness.   Psychiatric/Behavioral:  Negative for confusion and decreased concentration.      Objective:     Vital Signs (Most Recent):  Temp: 97.8 °F (36.6 °C) (05/21/24 1941)  Pulse: (!) 56 (05/21/24 1956)  Resp: 19 (05/21/24 1941)  BP: (!) 107/55 (05/21/24 1941)  SpO2: 95 % (05/21/24 1941) Vital Signs (24h Range):  Temp:  [97.7 °F (36.5 °C)-97.8 °F (36.6 °C)] 97.8 °F (36.6 °C)  Pulse:  [50-63] 56  Resp:  [17-23] 19  SpO2:  [93 %-100 %] 95 %  BP: (107-134)/(55-73) 107/55     Weight: 71.9 kg (158 lb 8.2 oz)  Body mass index is 28.99 kg/m².     Physical Exam  Vitals and nursing note reviewed.   Constitutional:       General: She is not in acute distress.     Appearance: She is well-developed.   HENT:      Head: Normocephalic and atraumatic.      Right Ear: External ear normal.      Left Ear: External ear normal.      Nose: Nose normal.   Eyes:      Conjunctiva/sclera: Conjunctivae normal.   Cardiovascular:      Rate and Rhythm: Normal rate and regular rhythm.   Pulmonary:      Effort: Pulmonary effort is normal. No respiratory distress.   Abdominal:      General: There is no distension.      Palpations: Abdomen is soft.   Skin:     General: Skin is warm and dry.   Neurological:      Mental Status: She is alert and oriented to person, place, and time.      Cranial Nerves: Facial asymmetry present.   Psychiatric:         Thought Content: Thought content normal.                Significant Labs: All pertinent labs within the past 24 hours have been  reviewed.    Significant Imaging: I have reviewed all pertinent imaging results/findings within the past 24 hours.  Assessment/Plan:     * TIA (transient ischemic attack)  Neuro recs:   MRI brain w/o contrast to evaluate for presence and characterization of infarct  - continue aspirin 81 mg, load plavix 300 mg x 1 now, followed by daily aspirin 81 mg /  clopidogrel 75 mg daily   - Transothoracic echocardiogram  - lipid profile and A1c for evaluation of modifiable risk factors  - PT/OT/SLP eval and Rx  - Permissive HTN < 220/120 mmHg x 48-72h pending results of vessel imaging      Antithrombotics for secondary stroke prevention: Antiplatelets: Aspirin: 81 mg daily  Clopidogrel: 300 mg loading dose x 1, now  Clopidogrel: 75 mg daily    Statins for secondary stroke prevention and hyperlipidemia, if present:   Statins: Atorvastatin- 40 mg daily    Aggressive risk factor modification: HTN, CAD     Rehab efforts: The patient has been evaluated by a stroke team provider and the therapy needs have been fully considered based off the presenting complaints and exam findings. The following therapy evaluations are needed: PT evaluate and treat, OT evaluate and treat, SLP evaluate and treat    Diagnostics ordered/pending: CT scan of head without contrast to asses brain parenchyma, CTA Head to assess vasculature , CTA Neck/Arch to assess vasculature, HgbA1C to assess blood glucose levels, Lipid Profile to assess cholesterol levels, MRI head without contrast to assess brain parenchyma, TTE to assess cardiac function/status , TSH to assess thyroid function    VTE prophylaxis: Enoxaparin 40 mg SQ every 24 hours    BP parameters: Infarct: No intervention, SBP <220        Hypertension  Chronic, controlled. Latest blood pressure and vitals reviewed-     Temp:  [97.7 °F (36.5 °C)-97.8 °F (36.6 °C)]   Pulse:  [50-63]   Resp:  [17-23]   BP: (107-134)/(55-73)   SpO2:  [93 %-100 %] .   Home meds for hypertension were reviewed and noted  below.   Hypertension Medications               isosorbide mononitrate (IMDUR) 30 MG 24 hr tablet TAKE 1 TABLET BY MOUTH EVERY DAY. Hold if SBP <120    metoprolol tartrate (LOPRESSOR) 25 MG tablet Take 1 tablet (25 mg total) by mouth 2 (two) times daily. FOR BLOOD PRESSURE    nitroGLYCERIN (NITROSTAT) 0.4 MG SL tablet PLACE 1 TAB UNDER TONGUE EVERY 5 MINUTES x 3 DOSES AS NEEDED FOR CHEST PAIN. IF NOT RESOLVED CALL 911            While in the hospital, will manage blood pressure as follows; Adjust home antihypertensive regimen as follows- permissive HTN pending MRI results    Will utilize p.r.n. blood pressure medication only if patient's blood pressure greater than 220/110 and she develops symptoms such as worsening chest pain or shortness of breath.    Centrilobular emphysema  PRN nebs    Coronary artery disease of native artery of native heart with stable angina pectoris  Patient with known CAD, which is controlled Will continue ASA, Plavix, and Statin and monitor for S/Sx of angina/ACS. Continue to monitor on telemetry.       VTE Risk Mitigation (From admission, onward)           Ordered     enoxaparin injection 40 mg  Daily         05/21/24 1618     IP VTE HIGH RISK PATIENT  Once         05/21/24 1618     Place sequential compression device  Until discontinued         05/21/24 1618                         On 05/21/2024, patient should be placed in hospital observation services under my care in collaboration with Bharti Farah MD..      AdmissionCare    Guideline: Transient Ischemic Attack (TIA) - OBS, Observation    Based on the indications selected for the patient, the bed status of Observation was determined to be MET    The following indications were selected as present at the time of evaluation of the patient:      - Appropriate evaluation (brain MRI, echocardiogram, carotid imaging) cannot be completed in emergency department time frame (3 to 4 hours).    AdmissionCare documentation entered by:  Sheridan Mckeon    [facility], 27th edition, Copyright © 2023 [facility] [employer], [facility] All Rights Reserved.  [id number]:00    Nahum Tamayo Jr., APRN, [facility]-BC  Hospitalist - Department of Hospital Medicine  [facility] [address] [place] [address]  Office #: [phone]; Pager #: [phone]

## 2024-05-22 NOTE — NURSING
Patient off unit to scheduled MRI testing. Patient with no c/o pain and is in NAD. Provider okayed the removal of the telemetry monitor while MRI is being obtained.

## 2024-05-22 NOTE — PLAN OF CARE
Problem: Stroke, Ischemic (Includes Transient Ischemic Attack)  Goal: Optimal Coping  Intervention: Support Psychosocial Response to Stroke  Flowsheets (Taken 5/22/2024 1022)  Supportive Measures:   active listening utilized   relaxation techniques promoted   decision-making supported  Family/Support System Care:   caregiver stress acknowledged   self-care encouraged  Goal: Optimal Cognitive Function  Intervention: Optimize Cognitive Function  Flowsheets (Taken 5/22/2024 1022)  Environment Familiarity/Consistency: daily routine followed  Reorientation Measures:   clock in view   reorientation provided  Sensory Stimulation Regulation:   care clustered   television on   quiet environment promoted  Goal: Improved Communication Skills  Intervention: Optimize Communication Skills  Flowsheets (Taken 5/22/2024 1022)  Communication Enhancement Strategies:   call light answered in person   verbal communication attempts encouraged  Goal: Optimal Functional Ability  Intervention: Optimize Functional Ability  Flowsheets (Taken 5/22/2024 1022)  Self-Care Promotion:   independence encouraged   meal set-up provided  Activity Management: Ambulated -L4     Problem: Fall Injury Risk  Goal: Absence of Fall and Fall-Related Injury  Intervention: Identify and Manage Contributors  Flowsheets (Taken 5/22/2024 1022)  Self-Care Promotion:   independence encouraged   meal set-up provided     Problem: Sensory Impairment  Goal: Optimal Sensory Management  Intervention: Optimize Sensory Function  Flowsheets (Taken 5/22/2024 1022)  Sensory Impairment Compensatory Techniques:   frequent position change encouraged   frequent skin inspection facilitated   optimal skin moisture level promoted   skin protection techniques promoted

## 2024-05-22 NOTE — PLAN OF CARE
05/22/24 1548   Final Note   Assessment Type Final Discharge Note   Anticipated Discharge Disposition Home   Hospital Resources/Appts/Education Provided Appointments scheduled and added to AVS   Post-Acute Status   Post-Acute Authorization Other   Other Status No Post-Acute Service Needs     Pts nurse Addy notified that the pt can d/c from CM standpoint

## 2024-05-22 NOTE — CONSULTS
Food & Nutrition  Education    Diet Education: Cardiac Diet  Time Spent: 15 min   Learners: Ade Castellano      Nutrition Education provided with handouts: DASH diet - Diet and Health - Heart Healthy Diet- Low salt diet - Meditteranean diet      Comments: RD consult for education on cardiac diet. Pt educated. Educations left with patient following education.      All questions and concerns answered. Dietitian's contact information provided.       Please Re-consult as needed        Thanks!   Samira Vincent, Registration Eligible, provisional LDN

## 2024-05-22 NOTE — PLAN OF CARE
B/s swallow eval completed. ST recs cont current Soft & Bite-sized diet thin liquids 2/2 hx of dcr UES opening, requiring dialation, per pt. Meds whole. No further ST is required at this time, as the pt is consuming the safest and least restrictive diet.

## 2024-05-22 NOTE — NURSING
Pt complained of intermittent lower back pain and chest pain. Daily medications given and EKG preformed. Pt was left in bed in NAD. Call light in reach. Pt was instructed to call chest and back pain continues.

## 2024-05-22 NOTE — PLAN OF CARE
"  Problem: Occupational Therapy  Goal: Occupational Therapy Goal  Outcome: Met    Initial eval complete. Pt was able to ambulate functional distances and complete transfers w/ CGA and no AD. Pt mildly unsteady and w/ complaints of "seeing someone" within her L peripheral vision but overall, tolerated OOB activities well. Pt w/ spO2 87-88% on RA but recovered to 93% w/ PLB and rest. Pt will benefit from LOW intensity therapy at time of d/c.      "

## 2024-05-22 NOTE — SUBJECTIVE & OBJECTIVE
Past Medical History:   Diagnosis Date    Abnormal stress test 8/5/2020    Acute respiratory failure with hypoxia and hypercapnia 11/29/2017    Angina pectoris     Arthritis     Bell's palsy     left facial weakness    Breast cancer     RIGHT    CAD (coronary artery disease)     Cervical cancer     Chronic bronchitis     Chronic heart failure with preserved ejection fraction 8/5/2020    Chronic heart failure with preserved ejection fraction 8/5/2020    COPD (chronic obstructive pulmonary disease)     Dr. Katz    Dental bridge present     Emphysema of lung     H/O colonoscopy 06/2017    due for repeat colonsocopy in 6/2018    History of Bell's palsy     History of heart artery stent     Dr. Ortiz  x2 stents    Hyperlipidemia     Hypertension     Lung cancer     Myocardial infarction     SUNITHA (obstructive sleep apnea)     intolerant to mask    SUNITHA (obstructive sleep apnea)     intolerant to mask     Pneumonia     Pneumonia due to other staphylococcus     PUD (peptic ulcer disease)     Severe anemia 6/11/2018    Sleep apnea     Vaginal delivery     x1       Past Surgical History:   Procedure Laterality Date    ADENOIDECTOMY      BREAST BIOPSY Right     x3    BREAST LUMPECTOMY      BREAST SURGERY      lumpectomy right side     CERVIX SURGERY      cone    COLONOSCOPY N/A 3/17/2017    Procedure: COLONOSCOPY;  Surgeon: Julio Rudd MD;  Location: [facility] ENDO;  Service: Endoscopy;  Laterality: N/A;    COLONOSCOPY N/A 6/30/2017    Procedure: COLONOSCOPY;  Surgeon: Julio Rudd MD;  Location: [facility] ENDO;  Service: Endoscopy;  Laterality: N/A;    COLONOSCOPY N/A 11/28/2018    Procedure: COLONOSCOPY;  Surgeon: Nura Urbina MD;  Location: [facility] ENDO;  Service: Endoscopy;  Laterality: N/A;    COLONOSCOPY N/A 1/29/2019    Procedure: COLONOSCOPY;  Surgeon: Emmanuel Perez MD;  Location: [facility] ENDO;  Service: Endoscopy;  Laterality: N/A;  confirmed appt-SP    COLONOSCOPY N/A 7/26/2022    Procedure: COLONOSCOPY;  Surgeon: James ANDERSON  MD Niraj;  Location: [facility] ENDO;  Service: Endoscopy;  Laterality: N/A;  fully vaccinated -  mediport in chest -     CORONARY ANGIOPLASTY WITH STENT PLACEMENT      ESOPHAGEAL MANOMETRY WITH MEASUREMENT OF IMPEDANCE N/A 7/10/2023    Procedure: MANOMETRY, ESOPHAGUS, WITH IMPEDANCE MEASUREMENT;  Surgeon: Miller Phillips MD;  Location: [facility] (4TH FLR);  Service: Gastroenterology;  Laterality: N/A;  pt on oxygen 2.5L  pt requested Tuesday only  5/31 referred by Dr. Miller Phillips/instr. mailed-st  7/3-r/s, updated instructions reviewed with pt in detail via phone, pt verbalized understanding-KPvt    ESOPHAGOGASTRODUODENOSCOPY N/A 1/25/2021    Procedure: EGD (ESOPHAGOGASTRODUODENOSCOPY);  Surgeon: Dmitry Gonzalez MD;  Location: [facility];  Service: Endoscopy;  Laterality: N/A;  rapid test >50 miles -ml    ESOPHAGOGASTRODUODENOSCOPY N/A 11/8/2022    Procedure: EGD (ESOPHAGOGASTRODUODENOSCOPY);  Surgeon: Tapan Stevenson MD;  Location: [facility] ENDO;  Service: Endoscopy;  Laterality: N/A;  w/dilation  fully vaccinated, instructions mailed-[facility]  11/4 pt called did not receive instructions, does not have portal or email, went over prep instructions and medication instructions with pt on the phone -LW    ESOPHAGOGASTRODUODENOSCOPY N/A 9/19/2023    Procedure: EGD (ESOPHAGOGASTRODUODENOSCOPY);  Surgeon: Miller Phillips MD;  Location: [facility];  Service: Endoscopy;  Laterality: N/A;  botox injection  inst mailed    EYE SURGERY Bilateral 06/08/2018    cataract     LEFT HEART CATHETERIZATION Left 8/13/2020    Procedure: Left heart cath, rra, noon;  Surgeon: Guevara Skelton MD;  Location: [facility] CATH LAB;  Service: Cardiology;  Laterality: Left;  RN PREOP 8/7/2020---COVID NEGATIVE ON 8/12    PORTACATH PLACEMENT Right 01/2018    sweat glands axillary regions Bilateral     TONSILLECTOMY         Review of patient's allergies indicates:  No Known Allergies    No current facility-administered medications on file prior to  encounter.     Current Outpatient Medications on File Prior to Encounter   Medication Sig    acetaminophen (TYLENOL) 650 MG TbSR Take 650 mg by mouth every 8 (eight) hours. (Patient not taking: Reported on 4/15/2024)    albuterol (PROVENTIL) 2.5 mg /3 mL (0.083 %) nebulizer solution NEBULIZE 1 vial EVERY 6 HOURS AS NEEDED FOR WHEEZING    aluminum & magnesium hydroxide-simethicone (MYLANTA MAXIMUM STRENGTH) 400-400-40 mg/5 mL suspension Take 10 mLs by mouth every 6 (six) hours as needed for Indigestion.    aspirin (ECOTRIN) 81 MG EC tablet Take 81 mg by mouth once daily.     diclofenac (VOLTAREN) 50 MG EC tablet Take 50 mg by mouth 2 (two) times daily.    fluticasone propionate (FLONASE) 50 mcg/actuation nasal spray SHAKE WELL & USE TWO SPRAYS EACH NOSTRIL ONCE A DAY    isosorbide mononitrate (IMDUR) 30 MG 24 hr tablet TAKE 1 TABLET BY MOUTH EVERY DAY. Hold if SBP <120    levocetirizine (XYZAL) 5 MG tablet TAKE 1 TABLET BY MOUTH EVERY DAY IN THE EVENING FOR ALLERGIES    metoprolol tartrate (LOPRESSOR) 25 MG tablet Take 1 tablet (25 mg total) by mouth 2 (two) times daily. FOR BLOOD PRESSURE    nitroGLYCERIN (NITROSTAT) 0.4 MG SL tablet PLACE 1 TAB UNDER TONGUE EVERY 5 MINUTES x 3 DOSES AS NEEDED FOR CHEST PAIN. IF NOT RESOLVED CALL 911    pantoprazole (PROTONIX) 40 MG tablet Take 1 tablet (40 mg total) by mouth once daily.    rosuvastatin (CRESTOR) 10 MG tablet Take 1 tablet (10 mg total) by mouth once daily.    TRELEGY ELLIPTA 100-62.5-25 mcg DsDv INHALE ONE PUFF INTO THE LUNGS ONCE A DAY    [DISCONTINUED] albuterol (VENTOLIN HFA) 90 mcg/actuation inhaler INHALE 2 PUFF(S) BY MOUTH EVERY 4 - 6 HOURS AS NEEDED FOR SHORTNESS OF BREATH or WHEEZING    [DISCONTINUED] azelastine (ASTELIN) 137 mcg (0.1 %) nasal spray 1 puff in each nostril Nasally Twice a day for 30 days    [DISCONTINUED] benzonatate (TESSALON) 100 MG capsule TAKE ONE CAPSULE BY MOUTH EVERY 6 HOURS AS NEEDED FOR COUGH (Patient not taking: Reported on  2/29/2024)    [DISCONTINUED] carboxymethylcellulose (REFRESH PLUS) 0.5 % Dpet 1 drop 3 (three) times daily as needed.    [DISCONTINUED] diclofenac (VOLTAREN) 50 MG EC tablet Take 1 tablet (50 mg total) by mouth 2 (two) times daily as needed.    [DISCONTINUED] heparin, porcine, PF, (HEPARIN FLUSH 100 UNITS/ML) 100 unit/mL Syrg     [DISCONTINUED] HYDROcodone-acetaminophen (NORCO) 7.5-325 mg per tablet Take 1 tablet by mouth every 6 (six) hours as needed. (Patient not taking: Reported on 4/15/2024)    [DISCONTINUED] isosorbide mononitrate (IMDUR) 30 MG 24 hr tablet Take 1 tablet (30 mg total) by mouth once daily. Hold if SBP <120    [DISCONTINUED] meclizine (ANTIVERT) 25 mg tablet take 1 TABLET(S) BY MOUTH TWICE DAILY AS NEEDED for dizziness (Patient not taking: Reported on 2/19/2024)    [DISCONTINUED] methylPREDNISolone (MEDROL DOSEPACK) 4 mg tablet as directed Orally (Patient not taking: Reported on 5/1/2024)    [DISCONTINUED] montelukast (SINGULAIR) 10 mg tablet TAKE 1 TABLET BY MOUTH EVERY DAY (Patient not taking: Reported on 4/15/2024)    [DISCONTINUED] omeprazole (PRILOSEC) 40 MG capsule Oral for 30    [DISCONTINUED] predniSONE (DELTASONE) 10 MG tablet TAKE 4 PILLS BY MOUTH FOR 3 DAYS, FOLLOWED BY 3 PILLS BY MOUTH FOR 3 DAYS, FOLLOWED BY 2 PILLS BY MOUTH FOR 3 DAYS. FOLLOWED BY 1 PILL BY MOUTH FOR 3 DAYS (Patient not taking: Reported on 2/19/2024)    [DISCONTINUED] sodium chloride (OCEAN) 0.65 % nasal spray 1 spray by Nasal route as needed for Congestion.     Family History       Problem Relation (Age of Onset)    Breast cancer Mother    COPD Sister    Cancer Mother, Father, Sister, Maternal Grandmother, Maternal Grandfather, Paternal Grandmother, Paternal Grandfather, Sister    Heart attack Sister    Heart disease Mother, Maternal Grandmother, Maternal Grandfather, Sister    Kidney cancer Son          Tobacco Use    Smoking status: Former     Current packs/day: 0.00     Average packs/day: 0.3 packs/day for 50.0  years (12.5 ttl pk-yrs)     Types: Cigarettes     Start date: 1967     Quit date: 2017     Years since quittin.5    Smokeless tobacco: Never    Tobacco comments:     7 years since smoked    Substance and Sexual Activity    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   HENT:  Negative for congestion and rhinorrhea.    Eyes:  Positive for visual disturbance. Negative for photophobia.   Respiratory:  Negative for cough and shortness of breath.    Cardiovascular:  Negative for chest pain, palpitations and leg swelling.   Gastrointestinal:  Negative for abdominal pain, diarrhea, nausea and vomiting.   Genitourinary:  Negative for dysuria, frequency and urgency.   Skin:  Negative for pallor, rash and wound.   Neurological:  Positive for dizziness, facial asymmetry (chronic), speech difficulty and headaches. Negative for light-headedness.   Psychiatric/Behavioral:  Negative for confusion and decreased concentration.      Objective:     Vital Signs (Most Recent):  Temp: 97.8 °F (36.6 °C) (24)  Pulse: (!) 56 (24)  Resp: 19 (24)  BP: (!) 107/55 (24)  SpO2: 95 % (24) Vital Signs (24h Range):  Temp:  [97.7 °F (36.5 °C)-97.8 °F (36.6 °C)] 97.8 °F (36.6 °C)  Pulse:  [50-63] 56  Resp:  [17-23] 19  SpO2:  [93 %-100 %] 95 %  BP: (107-134)/(55-73) 107/55     Weight: 71.9 kg (158 lb 8.2 oz)  Body mass index is 28.99 kg/m².     Physical Exam  Vitals and nursing note reviewed.   Constitutional:       General: She is not in acute distress.     Appearance: She is well-developed.   HENT:      Head: Normocephalic and atraumatic.      Right Ear: External ear normal.      Left Ear: External ear normal.      Nose: Nose normal.   Eyes:      Conjunctiva/sclera: Conjunctivae normal.   Cardiovascular:      Rate and Rhythm: Normal rate and regular  rhythm.   Pulmonary:      Effort: Pulmonary effort is normal. No respiratory distress.   Abdominal:      General: There is no distension.      Palpations: Abdomen is soft.   Skin:     General: Skin is warm and dry.   Neurological:      Mental Status: She is alert and oriented to person, place, and time.      Cranial Nerves: Facial asymmetry present.   Psychiatric:         Thought Content: Thought content normal.                Significant Labs: All pertinent labs within the past 24 hours have been reviewed.    Significant Imaging: I have reviewed all pertinent imaging results/findings within the past 24 hours.

## 2024-05-22 NOTE — NURSING
Per handoff received from Addy PEARSON LPN. Patient care assumed. Patients overall condition assessed and patient appears to be in NAD with no complaints of pain. 20g LFA is clean, dry, and intact. Call light in reach and patient instructed to inform the nurse if anything is needed. Patient stable and is continually being monitored.

## 2024-05-22 NOTE — ASSESSMENT & PLAN NOTE
Patient with known CAD, which is controlled Will continue ASA, Plavix, and Statin and monitor for S/Sx of angina/ACS. Continue to monitor on telemetry.

## 2024-05-22 NOTE — PLAN OF CARE
05/22/24 0856   Discharge Planning   Assessment Type Discharge Planning Brief Assessment   Resource/Environmental Concerns none   Support Systems Family members;Friends/neighbors   Equipment Currently Used at Home oxygen;nebulizer   Current Living Arrangements home   Patient/Family Anticipates Transition to home   Patient/Family Anticipated Services at Transition none   DME Needed Upon Discharge  none   Discharge Plan A Home  (with instructions to follow up)       [facility] - [place], [place] - [address] [address] [address]  [address] [address] [place] [address]  Phone: [phone] Fax: [phone]

## 2024-05-22 NOTE — NURSING
[facility], [facility] - [place]  Nurses Note -- 4 Eyes      5/22/2024       Skin assessed on: Q Shift      [x] No Pressure Injuries Present    [x]Prevention Measures Documented    [] Yes LDA  for Pressure Injury Previously documented     [] Yes New Pressure Injury Discovered   [] LDA for New Pressure Injury Added      Attending RN:  Addy Freeman LPN     Second RN: Paige GARZON

## 2024-05-22 NOTE — NURSING
[facility], [facility] - [place]  Nurses Note -- 4 Eyes      5/21/2024       Skin assessed on: Q Shift      [x] No Pressure Injuries Present    [x]Prevention Measures Documented    [] Yes LDA  for Pressure Injury Previously documented     [] Yes New Pressure Injury Discovered   [] LDA for New Pressure Injury Added      Attending RN:  Paige Todd, RN     Second RN:  Addy PEARSON LPN

## 2024-05-22 NOTE — ASSESSMENT & PLAN NOTE
Chronic, controlled. Latest blood pressure and vitals reviewed-     Temp:  [97.7 °F (36.5 °C)-97.8 °F (36.6 °C)]   Pulse:  [50-63]   Resp:  [17-23]   BP: (107-134)/(55-73)   SpO2:  [93 %-100 %] .   Home meds for hypertension were reviewed and noted below.   Hypertension Medications               isosorbide mononitrate (IMDUR) 30 MG 24 hr tablet TAKE 1 TABLET BY MOUTH EVERY DAY. Hold if SBP <120    metoprolol tartrate (LOPRESSOR) 25 MG tablet Take 1 tablet (25 mg total) by mouth 2 (two) times daily. FOR BLOOD PRESSURE    nitroGLYCERIN (NITROSTAT) 0.4 MG SL tablet PLACE 1 TAB UNDER TONGUE EVERY 5 MINUTES x 3 DOSES AS NEEDED FOR CHEST PAIN. IF NOT RESOLVED CALL 911            While in the hospital, will manage blood pressure as follows; Adjust home antihypertensive regimen as follows- permissive HTN pending MRI results    Will utilize p.r.n. blood pressure medication only if patient's blood pressure greater than 220/110 and she develops symptoms such as worsening chest pain or shortness of breath.

## 2024-05-22 NOTE — PT/OT/SLP EVAL
"Physical Therapy Evaluation and Discharge Note    Patient Name:  Ade Castellano   MRN:  [id number]    Recommendations:     Discharge Recommendations: Low Intensity Therapy  Discharge Equipment Recommendations: none   Barriers to discharge: None    Assessment:     Ade Castellano is a 78 y.o. female admitted with a medical diagnosis of TIA (transient ischemic attack). .  At this time, patient is functioning at their prior level of function and does not require further acute PT services.     Recent Surgery: * No surgery found *      Plan:     During this hospitalization, patient does not require further acute PT services.  Please re-consult if situation changes.      Subjective     Chief Complaint: "I keep seeing someone walking past me on my Left side, even when I'm in bed  Patient/Family Comments/goals: Pt agreeable to therapy eval, adamantly declined the use of AD and denied need for AD  Pain/Comfort:  Pain Rating 1: 0/10    Patients cultural, spiritual, Tenriism conflicts given the current situation: no    Living Environment:  Pt lives alone in a [facility] with 3STE, HR on L  Prior to admission, patients level of function was Independent.  Equipment used at home: oxygen.  DME owned (not currently used): none.  Upon discharge, patient will have assistance from friend/neighbor.    Objective:     Communicated with nsg prior to session.  Patient found HOB elevated with telemetry upon PT entry to room.    General Precautions: Standard, fall, respiratory    Orthopedic Precautions:N/A   Braces: N/A  Respiratory Status: Room air    Exams:  Cognitive Exam:  Patient is oriented to Person, Place, Time, and Situation  Gross Motor Coordination:  WFL  Postural Exam:  Patient presented with the following abnormalities:    -       Rounded shoulders  -       Forward head  Sensation:    -       Impaired  light/touch B LE's  Skin Integrity/Edema:      -       Skin integrity: Visible skin intact  -       Edema: None noted    RLE ROM: " WFL  RLE Strength: WFL  LLE ROM: WFL  LLE Strength: WFL    Functional Mobility:  Bed Mobility:     Scooting: modified independence  Supine to Sit: modified independence  Transfers:     Sit to Stand:  modified independence with no AD  Gait: SBA/Supervision approx 150'  Balance: Good-    AM-PAC 6 CLICK MOBILITY  Total Score:22       Treatment and Education:  SPO2 on RA with ambulation 87%.  Educated pt on Pursed lip breathing with quick recovery to 92%    AM-PAC 6 CLICK MOBILITY  Total Score:22     Patient left up in chair with call button in reach and nsg notified.    GOALS:   Multidisciplinary Problems       Physical Therapy Goals          Problem: Physical Therapy    Goal Priority Disciplines Outcome Goal Variances Interventions   Physical Therapy Goal     PT, PT/OT Adequate for Care Transition                         History:     Past Medical History:   Diagnosis Date    Abnormal stress test 8/5/2020    Acute respiratory failure with hypoxia and hypercapnia 11/29/2017    Angina pectoris     Arthritis     Bell's palsy     left facial weakness    Breast cancer     RIGHT    CAD (coronary artery disease)     Cervical cancer     Chronic bronchitis     Chronic heart failure with preserved ejection fraction 8/5/2020    Chronic heart failure with preserved ejection fraction 8/5/2020    COPD (chronic obstructive pulmonary disease)     Dr. Katz    Dental bridge present     Emphysema of lung     H/O colonoscopy 06/2017    due for repeat colonsocopy in 6/2018    History of Bell's palsy     History of heart artery stent     Dr. Ortiz  x2 stents    Hyperlipidemia     Hypertension     Lung cancer     Myocardial infarction     SUNITHA (obstructive sleep apnea)     intolerant to mask    SUNITHA (obstructive sleep apnea)     intolerant to mask     Pneumonia     Pneumonia due to other staphylococcus     PUD (peptic ulcer disease)     Severe anemia 6/11/2018    Sleep apnea     Vaginal delivery     x1       Past Surgical History:   Procedure  Laterality Date    ADENOIDECTOMY      BREAST BIOPSY Right     x3    BREAST LUMPECTOMY      BREAST SURGERY      lumpectomy right side     CERVIX SURGERY      cone    COLONOSCOPY N/A 3/17/2017    Procedure: COLONOSCOPY;  Surgeon: Julio Rudd MD;  Location: [facility] ENDO;  Service: Endoscopy;  Laterality: N/A;    COLONOSCOPY N/A 6/30/2017    Procedure: COLONOSCOPY;  Surgeon: Julio Rudd MD;  Location: [facility] ENDO;  Service: Endoscopy;  Laterality: N/A;    COLONOSCOPY N/A 11/28/2018    Procedure: COLONOSCOPY;  Surgeon: Nura Urbina MD;  Location: [facility] ENDO;  Service: Endoscopy;  Laterality: N/A;    COLONOSCOPY N/A 1/29/2019    Procedure: COLONOSCOPY;  Surgeon: Emmanuel Perez MD;  Location: [facility] ENDO;  Service: Endoscopy;  Laterality: N/A;  confirmed appt-SP    COLONOSCOPY N/A 7/26/2022    Procedure: COLONOSCOPY;  Surgeon: James Healy MD;  Location: [facility];  Service: Endoscopy;  Laterality: N/A;  fully vaccinated -  mediport in chest -     CORONARY ANGIOPLASTY WITH STENT PLACEMENT      ESOPHAGEAL MANOMETRY WITH MEASUREMENT OF IMPEDANCE N/A 7/10/2023    Procedure: MANOMETRY, ESOPHAGUS, WITH IMPEDANCE MEASUREMENT;  Surgeon: Miller Phillips MD;  Location: [facility] ([address] [place] [address]);  Service: Gastroenterology;  Laterality: N/A;  pt on oxygen 2.5L  pt requested Tuesday only  5/31 referred by Dr. Miller Phillips/instr. mailed-st  7/3-r/s, updated instructions reviewed with pt in detail via phone, pt verbalized understanding-vt    ESOPHAGOGASTRODUODENOSCOPY N/A 1/25/2021    Procedure: EGD (ESOPHAGOGASTRODUODENOSCOPY);  Surgeon: Dmitry Gonzalez MD;  Location: [facility];  Service: Endoscopy;  Laterality: N/A;  rapid test >50 miles -ml    ESOPHAGOGASTRODUODENOSCOPY N/A 11/8/2022    Procedure: EGD (ESOPHAGOGASTRODUODENOSCOPY);  Surgeon: Tapan Stevenson MD;  Location: [facility];  Service: Endoscopy;  Laterality: N/A;  w/dilation  fully vaccinated, instructions mailed-[facility]  11/4 pt called did not receive instructions,  does not have portal or email, went over prep instructions and medication instructions with pt on the phone -LW    ESOPHAGOGASTRODUODENOSCOPY N/A 9/19/2023    Procedure: EGD (ESOPHAGOGASTRODUODENOSCOPY);  Surgeon: Miller Phillips MD;  Location: [facility] ENDO;  Service: Endoscopy;  Laterality: N/A;  botox injection  inst mailed    EYE SURGERY Bilateral 06/08/2018    cataract     LEFT HEART CATHETERIZATION Left 8/13/2020    Procedure: Left heart cath, rra, noon;  Surgeon: Guevara Skelton MD;  Location: [facility] CATH LAB;  Service: Cardiology;  Laterality: Left;  RN PREOP 8/7/2020---COVID NEGATIVE ON 8/12    PORTACATH PLACEMENT Right 01/2018    sweat glands axillary regions Bilateral     TONSILLECTOMY         Time Tracking:     PT Received On: 05/22/24  PT Start Time: 0947     PT Stop Time: 0955  PT Total Time (min): 8 min     Billable Minutes: Evaluation 8      05/22/2024

## 2024-05-22 NOTE — CONSULTS
[facility] - Observation  Neurology Consult Note    Patient Name: Ade Castellano  Age: 78 y.o.  MRN: [id number]  Admission Date: 5/21/2024  Reason for consult:  TIA    CC:   blurry vision, headache    HPI:   Ade Castellano is a 78 y.o. year old  female with past medical history of CAD, COPD, and cancer of the cervix, lung, breast, and lymph nodes presented to the ED yesterday with sudden onset of blurry vision, gait instability and headache.  History obtained from patient and chart review.      Patient states that she was at the [employer] yesterday and playing [employer].  Suddenly she was unable to see her numbers clearly and had pain in the left arm.  She also developed headache in the back of the head around this time.   She states that she went home after this and while changing her clothes, she kept  falling to the right. She therefore presented to the ED further evaluation.  Patient has a known left facial droop from old Bell's palsy.  Otherwise denies any speech abnormality or loss of vision completely at this time.  Nonsmoker, takes a daily aspirin.    In the ED, vitals unremarkable.  Tele stroke was conducted, NIHSS 3 for known left sided facial palsy.  CT head and CTA were  obtained which were unremarkable as well. Patient was admitted for TIA workup.  She states that symptoms resolved slowly while she was in the ER and  felt she was at baseline by last night.  When she worked with physical therapy today,  she continued leaning to the right.       Histories:  Allergies:  Patient has no known allergies.    Current Medications:    Current Facility-Administered Medications   Medication Dose Route Frequency Provider Last Rate Last Admin    acetaminophen tablet 650 mg  650 mg Oral Q6H PRN Nahum Tamayo Jr., NP        aspirin EC tablet 81 mg  81 mg Oral Daily Nahum Tamayo Jr. NP   81 mg at 05/22/24 0854    atorvastatin tablet 40 mg  40 mg Oral Daily Nahum Tamayo Jr. NP   40 mg at 05/22/24 0854     bisacodyL suppository 10 mg  10 mg Rectal Daily PRN Nahum Tamayo Jr., NP        clopidogreL tablet 75 mg  75 mg Oral Daily Nahum Tamayo Jr., NP   75 mg at 05/22/24 0854    enoxaparin injection 40 mg  40 mg Subcutaneous Daily Nahum Tamayo Jr., NP   40 mg at 05/21/24 1749    labetaloL injection 10 mg  10 mg Intravenous Q15 Min PRN Nahum Tamayo Jr., NP        melatonin tablet 6 mg  6 mg Oral Nightly Nahum Tamayo Jr., NP   6 mg at 05/21/24 2127    ondansetron injection 4 mg  4 mg Intravenous Q12H PRN Nahum Tamayo Jr., NP        polyethylene glycol packet 17 g  17 g Oral Daily Nahum Tamayo Jr., NP        sodium chloride 0.9% bolus 500 mL 500 mL  500 mL Intravenous PRN Nahum Tamayo Jr., NP        sodium chloride 0.9% flush 10 mL  10 mL Intravenous PRN Nahum Tamayo Jr., NP           Medical:   Past Medical History:   Diagnosis Date    Abnormal stress test 8/5/2020    Acute respiratory failure with hypoxia and hypercapnia 11/29/2017    Angina pectoris     Arthritis     Bell's palsy     left facial weakness    Breast cancer     RIGHT    CAD (coronary artery disease)     Cervical cancer     Chronic bronchitis     Chronic heart failure with preserved ejection fraction 8/5/2020    Chronic heart failure with preserved ejection fraction 8/5/2020    COPD (chronic obstructive pulmonary disease)     Dr. Katz    Dental bridge present     Emphysema of lung     H/O colonoscopy 06/2017    due for repeat colonsocopy in 6/2018    History of Bell's palsy     History of heart artery stent     Dr. Ortiz  x2 stents    Hyperlipidemia     Hypertension     Lung cancer     Myocardial infarction     SUNITHA (obstructive sleep apnea)     intolerant to mask    SUNITHA (obstructive sleep apnea)     intolerant to mask     Pneumonia     Pneumonia due to other staphylococcus     PUD (peptic ulcer disease)     Severe anemia 6/11/2018    Sleep apnea     Vaginal delivery     x1        Surgeries:   Past Surgical History:   Procedure  Laterality Date    ADENOIDECTOMY      BREAST BIOPSY Right     x3    BREAST LUMPECTOMY      BREAST SURGERY      lumpectomy right side     CERVIX SURGERY      cone    COLONOSCOPY N/A 3/17/2017    Procedure: COLONOSCOPY;  Surgeon: Julio Rudd MD;  Location: [facility] ENDO;  Service: Endoscopy;  Laterality: N/A;    COLONOSCOPY N/A 6/30/2017    Procedure: COLONOSCOPY;  Surgeon: Julio Rudd MD;  Location: [facility] ENDO;  Service: Endoscopy;  Laterality: N/A;    COLONOSCOPY N/A 11/28/2018    Procedure: COLONOSCOPY;  Surgeon: Nura Urbina MD;  Location: [facility] ENDO;  Service: Endoscopy;  Laterality: N/A;    COLONOSCOPY N/A 1/29/2019    Procedure: COLONOSCOPY;  Surgeon: Emamnuel Perez MD;  Location: [facility] ENDO;  Service: Endoscopy;  Laterality: N/A;  confirmed appt-SP    COLONOSCOPY N/A 7/26/2022    Procedure: COLONOSCOPY;  Surgeon: James Healy MD;  Location: [facility];  Service: Endoscopy;  Laterality: N/A;  fully vaccinated -  mediport in chest -     CORONARY ANGIOPLASTY WITH STENT PLACEMENT      ESOPHAGEAL MANOMETRY WITH MEASUREMENT OF IMPEDANCE N/A 7/10/2023    Procedure: MANOMETRY, ESOPHAGUS, WITH IMPEDANCE MEASUREMENT;  Surgeon: Miller Phillips MD;  Location: [facility] ([address] [place] [address]);  Service: Gastroenterology;  Laterality: N/A;  pt on oxygen 2.5L  pt requested Tuesday only  5/31 referred by Dr. Miller Phillips/instr. mailed-st  7/3-r/s, updated instructions reviewed with pt in detail via phone, pt verbalized understanding-vt    ESOPHAGOGASTRODUODENOSCOPY N/A 1/25/2021    Procedure: EGD (ESOPHAGOGASTRODUODENOSCOPY);  Surgeon: Dmitry Gonzalez MD;  Location: [facility];  Service: Endoscopy;  Laterality: N/A;  rapid test >50 miles -ml    ESOPHAGOGASTRODUODENOSCOPY N/A 11/8/2022    Procedure: EGD (ESOPHAGOGASTRODUODENOSCOPY);  Surgeon: Tapan Stevenson MD;  Location: [facility];  Service: Endoscopy;  Laterality: N/A;  w/dilation  fully vaccinated, instructions mailed-[facility]  11/4 pt called did not receive instructions,  does not have portal or email, went over prep instructions and medication instructions with pt on the phone -LW    ESOPHAGOGASTRODUODENOSCOPY N/A 2023    Procedure: EGD (ESOPHAGOGASTRODUODENOSCOPY);  Surgeon: Miller Phillips MD;  Location: [facility] ENDO;  Service: Endoscopy;  Laterality: N/A;  botox injection  inst mailed    EYE SURGERY Bilateral 2018    cataract     LEFT HEART CATHETERIZATION Left 2020    Procedure: Left heart cath, rra, noon;  Surgeon: Guevara Skelton MD;  Location: [facility] CATH LAB;  Service: Cardiology;  Laterality: Left;  RN PREOP 2020---COVID NEGATIVE ON     PORTACATH PLACEMENT Right 2018    sweat glands axillary regions Bilateral     TONSILLECTOMY          Family:   Family History   Problem Relation Name Age of Onset    Cancer Mother          breast    Heart disease Mother      Breast cancer Mother      Cancer Father          lung-smoker     Cancer Sister          lung-smoker     Heart attack Sister      Cancer Maternal Grandmother      Heart disease Maternal Grandmother      Cancer Maternal Grandfather      Heart disease Maternal Grandfather      Cancer Paternal Grandmother      Cancer Paternal Grandfather      Cancer Sister          mets not sure where it started     COPD Sister      Heart disease Sister      Kidney cancer Son      Colon cancer Neg Hx      Esophageal cancer Neg Hx         Tobacco Use    Smoking status: Former     Current packs/day: 0.00     Average packs/day: 0.3 packs/day for 50.0 years (12.5 ttl pk-yrs)     Types: Cigarettes     Start date: 1967     Quit date: 2017     Years since quittin.5    Smokeless tobacco: Never    Tobacco comments:     7 years since smoked    Substance and Sexual Activity    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    Neurological Exam  NIHSS ([place] [facility] Stroke Scale)   1a  Level of consciousness: 0=alert; keenly responsive   1b. LOC questions:  0 = Answers both questions correctly   1c. LOC commands: 0=Answers both tasks correctly   2.  Best Gaze: 0=normal   3. Visual: 0=No visual loss   4. Facial Palsy: 3=Complete paralysis of one or both sides (absence of facial movement in the upper and lower face)   5a. Motor left arm: 1=Drift, limb holds 90 (or 45) degrees but drifts down before full 10 seconds: does not hit bed   5b.  Motor right arm: 0=No drift, limb holds 90 (or 45) degrees for full 10 seconds   6a. motor left le=Drift, limb holds 90 (or 45) degrees but drifts down before full 10 seconds: does not hit bed   6b  Motor right le=No drift, limb holds 90 (or 45) degrees for full 10 seconds   7. Limb Ataxia: 0=Absent   8.  Sensory: 0=Normal; no sensory loss   9. Best Language:  0=No aphasia, normal   10. Dysarthria: 0=Normal   11. Extinction and Inattention: 0=No abnormality         Total:   5         Significant Labs:   Recent Lab Results  (Last 5 results in the past 24 hours)        24  0718   24  0454   24  2022   24  1431   24  1430        Albumin   3.5             ALP   40             Allens Test       N/A         ALT   13             Anion Gap   14             PTT   26.5  Comment: Refer to local heparin nomogram for intensity/dose specific   therapeutic   range.               AST   15             Baso #   0.06             Basophil %   0.8             BILIRUBIN TOTAL   0.5  Comment: For infants and newborns, interpretation of results should be based  on gestational age, weight and in agreement with clinical  observations.    Premature Infant recommended reference ranges:  Up to 24 " hours.............<8.0 mg/dL  Up to 48 hours............<12.0 mg/dL  3-5 days..................<15.0 mg/dL  6-29 days.................<15.0 mg/dL               Site       Other         BUN   14             Calcium   9.5             Chloride   105             CO2   22             Creatinine   0.8             Differential Method   Automated             eGFR   >60             Eos #   0.3             Eos %   3.8             Estimated Avg Glucose   117             Glucose   97             Gran # (ANC)   5.3             Gran %   72.1             Hematocrit   40.5             Hemoglobin   12.6             Hemoglobin A1C External   5.7  Comment: ADA Screening Guidelines:  5.7-6.4%  Consistent with prediabetes  >or=6.5%  Consistent with diabetes    High levels of fetal hemoglobin interfere with the HbA1C  assay. Heterozygous hemoglobin variants (HbS, HgC, etc)do  not significantly interfere with this assay.   However, presence of multiple variants may affect accuracy.               Immature Grans (Abs)   0.04  Comment: Mild elevation in immature granulocytes is non specific and   can be seen in a variety of conditions including stress response,   acute inflammation, trauma and pregnancy. Correlation with other   laboratory and clinical findings is essential.               Immature Granulocytes   0.5             INR   1.0  Comment: Coumadin Therapy:  2.0 - 3.0 for INR for all indicators except mechanical heart valves  and antiphospholipid syndromes which should use 2.5 - 3.5.               Lymph #   1.0             Lymph %   14.0             Magnesium    1.8             MCH   29.7             MCHC   31.1             MCV   96             Mono #   0.7             Mono %   8.8             MPV   9.9             nRBC   0             QRS Duration         80       OHS QTC Calculation         437       Phosphorus Level   3.8             Platelet Count   145             POC PTINR       2.0         POC PTWBT       22.9         POCT  Glucose 104     128           Potassium   3.7             PROTEIN TOTAL   6.0             PT   10.5             RBC   4.24             RDW   13.6             Sample       VENOUS         Sodium   141             WBC   7.37                                    Significant Imaging:   Images were personally reviewed and interpreted:    CTA head and neck: no LVO/HGS     MRI brain without contrast:  no acute intracranial abnormality     TTE results pending    Assessment and Plan:   78 y.o. year old  female with past medical history of CAD, COPD, and cancer of the cervix, lung, breast, and lymph nodes presented to the ED yesterday with sudden onset of blurry vision, gait instability and headache. Symptoms resolved while in the ED per patient but however notice some gait imbalance with PT today.  NIHSS 5 for  complete left-sided facial droop(old), left-sided drift in upper and lower extremity.   Imaging unremarkable, however  concerning for vascular etiology.  At times posterior circulation infarcts can take up to 5 days to appear on MRI and therefore we will treat accordingly.  Etiology  appears to be secondary to small-vessel disease.    Plan:   -  continue aspirin 81 mg /  clopidogrel 75 mg x 21 days followed by ASA 81mg OD monotherapy therafter  - High intensity statin  atorvastatin 40 mg daily  - echo results pending. Holter monitor at d/c if TTE is negative   -Permissive HTN x 24 hrs post index event / long term < 140/90   -Goal Parameters for TIA/stroke: LDL<70 mg/dl, HbA1C: <7.0 %,  SBP<130/80 (discussed risk factor optimization in order to reduce the risk of future events with help of PCP)  -PT/OT evaluation and therapy goals per their recommendations  -Diet and Exercise: Discussed aggressive lifestyle modification. Eat primarily plant-based foods, such as fruits and vegetables, whole grains, legumes (beans) and nuts. Discussed Mediterranean diet. Daily exercise (150 minutes per week).  -Provided education regarding  future stroke identification: I went over the usual stroke warning signs and symptoms, and the need to activate EMS (call 911) as soon as the symptoms present including-sudden onset numbness or weakness of the face, arm, or leg; especially on one side of the body; sudden confusion, trouble speaking, or understanding; sudden trouble seeing in one or both eyes; sudden trouble walking; dizziness; loss of coordination.  -   Follow up in  vascular Neurology Clinic as outpatient      Thank you for your consult. I will sign off. Please contact us if you have any additional questions.    Time spent on this encounter: 40 minutes. This includes face to face time and non-face to face time preparing to see the patient (eg, review of tests), obtaining and/or reviewing separately obtained history, documenting clinical information in the electronic or other health record, independently interpreting results and communicating results to the patient/family/caregiver, or care coordinator.     Birdie Matta MD  Neurology  [facility]

## 2024-05-22 NOTE — PLAN OF CARE
Problem: Adult Inpatient Plan of Care  Goal: Plan of Care Review  Flowsheets (Taken 5/22/2024 0112)  Plan of Care Reviewed With: patient  Goal: Absence of Hospital-Acquired Illness or Injury  Intervention: Prevent and Manage VTE (Venous Thromboembolism) Risk  Flowsheets (Taken 5/22/2024 0112)  VTE Prevention/Management: ROM (active) performed  Goal: Optimal Comfort and Wellbeing  Intervention: Monitor Pain and Promote Comfort  Flowsheets (Taken 5/22/2024 0112)  Pain Management Interventions: pain management plan reviewed with patient/caregiver  Intervention: Provide Person-Centered Care  Flowsheets (Taken 5/22/2024 0112)  Trust Relationship/Rapport:   care explained   questions answered   questions encouraged   empathic listening provided   reassurance provided   thoughts/feelings acknowledged     Problem: Stroke, Ischemic (Includes Transient Ischemic Attack)  Goal: Optimal Coping  Intervention: Support Psychosocial Response to Stroke  Flowsheets (Taken 5/22/2024 0112)  Family/Support System Care:   caregiver stress acknowledged   involvement promoted   presence promoted   self-care encouraged  Goal: Optimal Cerebral Tissue Perfusion  Intervention: Protect and Optimize Cerebral Perfusion  Flowsheets (Taken 5/22/2024 0112)  Sensory Stimulation Regulation:   care clustered   lighting decreased   auditory stimulation minimized  Cerebral Perfusion Promotion: blood pressure monitored  Goal: Optimal Cognitive Function  Intervention: Optimize Cognitive Function  Flowsheets (Taken 5/22/2024 0112)  Sensory Stimulation Regulation:   care clustered   lighting decreased   auditory stimulation minimized  Goal: Effective Oxygenation and Ventilation  Intervention: Optimize Oxygenation and Ventilation  Flowsheets (Taken 5/22/2024 0112)  Airway/Ventilation Management: calming measures promoted  Goal: Improved Sensorimotor Function  Intervention: Optimize Range of Motion, Motor Control and Function  Flowsheets (Taken 5/22/2024  0112)  Range of Motion: active ROM (range of motion) encouraged  Goal: Effective Urinary Elimination  Intervention: Promote Effective Bladder Elimination  Flowsheets (Taken 5/22/2024 0112)  Urinary Elimination Promotion: voiding relaxation promoted     Problem: Fall Injury Risk  Goal: Absence of Fall and Fall-Related Injury  Intervention: Identify and Manage Contributors  Flowsheets (Taken 5/22/2024 0112)  Self-Care Promotion:   independence encouraged   BADL personal objects within reach  Medication Review/Management: medications reviewed

## 2024-05-22 NOTE — PLAN OF CARE
Problem: Adult Inpatient Plan of Care  Goal: Plan of Care Review  Outcome: Met  Goal: Patient-Specific Goal (Individualized)  Outcome: Met  Goal: Absence of Hospital-Acquired Illness or Injury  Outcome: Met  Goal: Optimal Comfort and Wellbeing  Outcome: Met  Goal: Readiness for Transition of Care  Outcome: Met     Problem: Stroke, Ischemic (Includes Transient Ischemic Attack)  Goal: Optimal Coping  Outcome: Met  Intervention: Support Psychosocial Response to Stroke  Flowsheets (Taken 5/22/2024 1022)  Supportive Measures:   active listening utilized   relaxation techniques promoted   decision-making supported  Family/Support System Care:   caregiver stress acknowledged   self-care encouraged  Goal: Effective Bowel Elimination  Outcome: Met  Goal: Optimal Cerebral Tissue Perfusion  Outcome: Met  Goal: Optimal Cognitive Function  Outcome: Met  Intervention: Optimize Cognitive Function  Flowsheets (Taken 5/22/2024 1022)  Environment Familiarity/Consistency: daily routine followed  Reorientation Measures:   clock in view   reorientation provided  Sensory Stimulation Regulation:   care clustered   television on   quiet environment promoted  Goal: Improved Communication Skills  Outcome: Met  Intervention: Optimize Communication Skills  Flowsheets (Taken 5/22/2024 1022)  Communication Enhancement Strategies:   call light answered in person   verbal communication attempts encouraged  Goal: Optimal Functional Ability  Outcome: Met  Intervention: Optimize Functional Ability  Flowsheets (Taken 5/22/2024 1022)  Self-Care Promotion:   independence encouraged   meal set-up provided  Activity Management: Ambulated -L4  Goal: Optimal Nutrition Intake  Outcome: Met  Goal: Effective Oxygenation and Ventilation  Outcome: Met  Goal: Improved Sensorimotor Function  Outcome: Met  Goal: Safe and Effective Swallow  Outcome: Met  Goal: Effective Urinary Elimination  Outcome: Met     Problem: Fall Injury Risk  Goal: Absence of Fall and  Fall-Related Injury  Outcome: Met  Intervention: Identify and Manage Contributors  Flowsheets (Taken 5/22/2024 1022)  Self-Care Promotion:   independence encouraged   meal set-up provided     Problem: Infection  Goal: Absence of Infection Signs and Symptoms  Outcome: Met     Problem: Sensory Impairment  Goal: Optimal Sensory Management  Outcome: Met  Intervention: Optimize Sensory Function  Flowsheets (Taken 5/22/2024 1022)  Sensory Impairment Compensatory Techniques:   frequent position change encouraged   frequent skin inspection facilitated   optimal skin moisture level promoted   skin protection techniques promoted

## 2024-05-22 NOTE — PT/OT/SLP EVAL
"Occupational Therapy   Evaluation and Discharge Note    Name: Ade Castellano  MRN: [id number]  Admitting Diagnosis: TIA (transient ischemic attack)  Recent Surgery: * No surgery found *      Recommendations:     Discharge Recommendations: Low Intensity Therapy  Discharge Equipment Recommendations: none  Barriers to discharge:  None    Assessment:     Ade Castellano is a 78 y.o. female with a medical diagnosis of TIA (transient ischemic attack). At this time, patient is functioning at their prior level of function and does not require further acute OT services.     Initial eval complete. Pt was able to ambulate functional distances and complete transfers w/ CGA and no AD. Pt mildly unsteady and w/ complaints of "seeing someone" within her L peripheral vision but overall, tolerated OOB activities well. Pt w/ spO2 87-88% on RA but recovered to 93% w/ PLB and rest. Pt will benefit from LOW intensity therapy at time of d/c.     Plan:     During this hospitalization, patient does not require further acute OT services.  Please re-consult if situation changes.    Plan of Care Reviewed with: patient    Subjective     Chief Complaint: none stated   Patient/Family Comments/goals: ready to d/c home     Occupational Profile:  Living Environment: Pt lives alone in a mobile home w/ 3 BARBARA and L HR.   Previous level of function: Pt was mod I w/ ADLs and functional mobility. Reports she uses 2L O2 nc AS NEEDED.  Roles and Routines: Enjoys going to the [employer].   Equipment Used at home: oxygen  Assistance upon Discharge: Friend/relative    Pain/Comfort:  Pain Rating 1: 0/10  Pain Rating Post-Intervention 1: 0/10    Patients cultural, spiritual, Uatsdin conflicts given the current situation: no    Objective:     Communicated with: Nurse prior to session.  Patient found HOB elevated with telemetry upon OT entry to room.    General Precautions: Standard, fall, respiratory  Orthopedic Precautions: N/A  Braces: " N/A  Respiratory Status: Room air; O2 flow set to 2L but NC out of nares w/ spO2 >90% on RA at rest     Occupational Performance:    Bed Mobility:    Patient completed Scooting/Bridging with modified independence  Patient completed Supine to Sit with modified independence    Functional Mobility/Transfers:  Patient completed Sit <> Stand Transfer with stand by assistance and contact guard assistance  with  no assistive device   Patient completed Bed <> Chair Transfer using Step Transfer technique with contact guard assistance with no assistive device  Functional Mobility: Pt was able to ambulate functional distances w/ CGA and no AD; pt w/ mild instability; refer to PT note for assessment.     Activities of Daily Living:  Feeding:  modified independence for self-feeding breakfast   Upper Body Dressing: minimum assistance for donning gown over back     Cognitive/Visual Perceptual:  Cognitive/Psychosocial Skills:     -       Oriented to: Person, Place, Time, and Situation   -       Follows Commands/attention:Follows multistep  commands  -       Communication: clear/fluent  -       Memory: No Deficits noted  -       Safety awareness/insight to disability: mildly impaired     Physical Exam:  Balance:    -       good sitting balance; fair standing   Postural examination/scapula alignment:    -       No postural abnormalities identified  Skin integrity: Visible skin intact  Edema:  None noted  Sensation:    -       Intact  Upper Extremity Range of Motion:     -       Right Upper Extremity: WFL  -       Left Upper Extremity: WFL  Upper Extremity Strength:    -       Right Upper Extremity: WFL  -       Left Upper Extremity: WFL   Strength:    -       Right Upper Extremity: WFL  -       Left Upper Extremity: WFL    AMPAC 6 Click ADL:  AMPAC Total Score: 24    Treatment & Education:  -Initial eval complete.   -Pt educated on role of OT and POC.   -Pt educated on importance of OOB activity w/ staff.   -Pt educated on  negative effects of prolonged bed rest and was encouraged to sit up in chair at least 3 hours per day.   -Pt educated on PLB and energy conservation strategies to prevent over-exertion and maintain desirable spO2 vitals.   -Questions and concerns addressed within scope.    Patient left up in chair with all lines intact and call button in reach    GOALS:   Multidisciplinary Problems       Occupational Therapy Goals       Not on file              Multidisciplinary Problems (Resolved)          Problem: Occupational Therapy    Goal Priority Disciplines Outcome Interventions   Occupational Therapy Goal   (Resolved)     OT, PT/OT Met                        History:     Past Medical History:   Diagnosis Date    Abnormal stress test 8/5/2020    Acute respiratory failure with hypoxia and hypercapnia 11/29/2017    Angina pectoris     Arthritis     Bell's palsy     left facial weakness    Breast cancer     RIGHT    CAD (coronary artery disease)     Cervical cancer     Chronic bronchitis     Chronic heart failure with preserved ejection fraction 8/5/2020    Chronic heart failure with preserved ejection fraction 8/5/2020    COPD (chronic obstructive pulmonary disease)     Dr. Katz    Dental bridge present     Emphysema of lung     H/O colonoscopy 06/2017    due for repeat colonsocopy in 6/2018    History of Bell's palsy     History of heart artery stent     Dr. Ortiz  x2 stents    Hyperlipidemia     Hypertension     Lung cancer     Myocardial infarction     SUNITHA (obstructive sleep apnea)     intolerant to mask    SUNITHA (obstructive sleep apnea)     intolerant to mask     Pneumonia     Pneumonia due to other staphylococcus     PUD (peptic ulcer disease)     Severe anemia 6/11/2018    Sleep apnea     Vaginal delivery     x1         Past Surgical History:   Procedure Laterality Date    ADENOIDECTOMY      BREAST BIOPSY Right     x3    BREAST LUMPECTOMY      BREAST SURGERY      lumpectomy right side     CERVIX SURGERY      cone     COLONOSCOPY N/A 3/17/2017    Procedure: COLONOSCOPY;  Surgeon: Julio Rudd MD;  Location: [facility] ENDO;  Service: Endoscopy;  Laterality: N/A;    COLONOSCOPY N/A 6/30/2017    Procedure: COLONOSCOPY;  Surgeon: Julio Rudd MD;  Location: [facility] ENDO;  Service: Endoscopy;  Laterality: N/A;    COLONOSCOPY N/A 11/28/2018    Procedure: COLONOSCOPY;  Surgeon: Nura Urbina MD;  Location: [facility] ENDO;  Service: Endoscopy;  Laterality: N/A;    COLONOSCOPY N/A 1/29/2019    Procedure: COLONOSCOPY;  Surgeon: Emmanuel Perez MD;  Location: [facility] ENDO;  Service: Endoscopy;  Laterality: N/A;  confirmed appt-SP    COLONOSCOPY N/A 7/26/2022    Procedure: COLONOSCOPY;  Surgeon: James Heayl MD;  Location: [facility];  Service: Endoscopy;  Laterality: N/A;  fully vaccinated -  mediport in chest -     CORONARY ANGIOPLASTY WITH STENT PLACEMENT      ESOPHAGEAL MANOMETRY WITH MEASUREMENT OF IMPEDANCE N/A 7/10/2023    Procedure: MANOMETRY, ESOPHAGUS, WITH IMPEDANCE MEASUREMENT;  Surgeon: Miller Phillips MD;  Location: [facility] (4TH FLR);  Service: Gastroenterology;  Laterality: N/A;  pt on oxygen 2.5L  pt requested Tuesday only  5/31 referred by Dr. Miller Phillips/instr. mailed-st  7/3-r/s, updated instructions reviewed with pt in detail via phone, pt verbalized understanding-[facility]    ESOPHAGOGASTRODUODENOSCOPY N/A 1/25/2021    Procedure: EGD (ESOPHAGOGASTRODUODENOSCOPY);  Surgeon: Dmitry Gonzalez MD;  Location: [facility];  Service: Endoscopy;  Laterality: N/A;  rapid test >50 miles -ml    ESOPHAGOGASTRODUODENOSCOPY N/A 11/8/2022    Procedure: EGD (ESOPHAGOGASTRODUODENOSCOPY);  Surgeon: Tapan Stevenson MD;  Location: [facility] ENDO;  Service: Endoscopy;  Laterality: N/A;  w/dilation  fully vaccinated, instructions mailed-\[facility]\""  11/4 pt called did not receive instructions, does not have portal or email, went over prep instructions and medication instructions with pt on the phone -LW    ESOPHAGOGASTRODUODENOSCOPY N/A 9/19/2023     Procedure: EGD (ESOPHAGOGASTRODUODENOSCOPY);  Surgeon: Miller Phillips MD;  Location: [facility] ENDO;  Service: Endoscopy;  Laterality: N/A;  botox injection  inst mailed    EYE SURGERY Bilateral 06/08/2018    cataract     LEFT HEART CATHETERIZATION Left 8/13/2020    Procedure: Left heart cath, rra, noon;  Surgeon: Guevara Skelton MD;  Location: [facility] CATH LAB;  Service: Cardiology;  Laterality: Left;  RN PREOP 8/7/2020---COVID NEGATIVE ON 8/12    PORTACATH PLACEMENT Right 01/2018    sweat glands axillary regions Bilateral     TONSILLECTOMY         Time Tracking:     OT Date of Treatment: 05/22/24  OT Start Time: 0941  OT Stop Time: 0954  OT Total Time (min): 13 min    Billable Minutes:Evaluation 13  Total Time 13 (co-eval w/ PT)     5/22/2024

## 2024-05-22 NOTE — PLAN OF CARE
Problem: Physical Therapy  Goal: Physical Therapy Goal  Outcome: Adequate for Care Transition   Initial PT evaluation performed today.   Low Intensity Therapy recommended at time of D/C.  PT to sign off.  OK for OOB to chair and ambulate to bathroom with nursing staff.

## 2024-05-22 NOTE — PT/OT/SLP EVAL
Speech Language Pathology Evaluation  Bedside Swallow    Patient Name:  Ade Castellano   MRN:  [id number]  Admitting Diagnosis: TIA (transient ischemic attack)    Recommendations:                 General Recommendations:  Follow-up not indicated  Diet recommendations:  Soft & Bite Sized Diet - IDDSI Level 6, Thin liquids - IDDSI Level 0   Aspiration Precautions: Meds whole 1 at a time and Standard aspiration precautions   General Precautions: Standard,    Communication strategies:  none    Assessment:     Ade Castellano is a 78 y.o. female with a dx of TIA (transient ischemic attack) who presents w/ mild oral dysphagia 2/2 scattered dentition. ST recs cont current Soft & Bit-sized diet w/ thin liquids. No further ST is required at this time, as the pt is consuming the safest and least restrictive diet.     History:     Past Medical History:   Diagnosis Date    Abnormal stress test 8/5/2020    Acute respiratory failure with hypoxia and hypercapnia 11/29/2017    Angina pectoris     Arthritis     Bell's palsy     left facial weakness    Breast cancer     RIGHT    CAD (coronary artery disease)     Cervical cancer     Chronic bronchitis     Chronic heart failure with preserved ejection fraction 8/5/2020    Chronic heart failure with preserved ejection fraction 8/5/2020    COPD (chronic obstructive pulmonary disease)     Dr. Katz    Dental bridge present     Emphysema of lung     H/O colonoscopy 06/2017    due for repeat colonsocopy in 6/2018    History of Bell's palsy     History of heart artery stent     Dr. Ortiz  x2 stents    Hyperlipidemia     Hypertension     Lung cancer     Myocardial infarction     SUNITHA (obstructive sleep apnea)     intolerant to mask    SUNITHA (obstructive sleep apnea)     intolerant to mask     Pneumonia     Pneumonia due to other staphylococcus     PUD (peptic ulcer disease)     Severe anemia 6/11/2018    Sleep apnea     Vaginal delivery     x1       Past Surgical History:   Procedure Laterality  Date    ADENOIDECTOMY      BREAST BIOPSY Right     x3    BREAST LUMPECTOMY      BREAST SURGERY      lumpectomy right side     CERVIX SURGERY      cone    COLONOSCOPY N/A 3/17/2017    Procedure: COLONOSCOPY;  Surgeon: Julio Rudd MD;  Location: [facility] ENDO;  Service: Endoscopy;  Laterality: N/A;    COLONOSCOPY N/A 6/30/2017    Procedure: COLONOSCOPY;  Surgeon: Julio Rudd MD;  Location: [facility] ENDO;  Service: Endoscopy;  Laterality: N/A;    COLONOSCOPY N/A 11/28/2018    Procedure: COLONOSCOPY;  Surgeon: Nura Urbina MD;  Location: [facility] ENDO;  Service: Endoscopy;  Laterality: N/A;    COLONOSCOPY N/A 1/29/2019    Procedure: COLONOSCOPY;  Surgeon: Emmanuel Perez MD;  Location: [facility];  Service: Endoscopy;  Laterality: N/A;  confirmed appt-SP    COLONOSCOPY N/A 7/26/2022    Procedure: COLONOSCOPY;  Surgeon: James Healy MD;  Location: [facility];  Service: Endoscopy;  Laterality: N/A;  fully vaccinated -  mediport in chest -     CORONARY ANGIOPLASTY WITH STENT PLACEMENT      ESOPHAGEAL MANOMETRY WITH MEASUREMENT OF IMPEDANCE N/A 7/10/2023    Procedure: MANOMETRY, ESOPHAGUS, WITH IMPEDANCE MEASUREMENT;  Surgeon: Miller Phillips MD;  Location: [facility] ([address] [place] [address]);  Service: Gastroenterology;  Laterality: N/A;  pt on oxygen 2.5L  pt requested Tuesday only  5/31 referred by Dr. Miller Phillips/instr. mailed-st  7/3-r/s, updated instructions reviewed with pt in detail via phone, pt verbalized understanding-vt    ESOPHAGOGASTRODUODENOSCOPY N/A 1/25/2021    Procedure: EGD (ESOPHAGOGASTRODUODENOSCOPY);  Surgeon: Dmitry Gonzalez MD;  Location: [facility];  Service: Endoscopy;  Laterality: N/A;  rapid test >50 miles -ml    ESOPHAGOGASTRODUODENOSCOPY N/A 11/8/2022    Procedure: EGD (ESOPHAGOGASTRODUODENOSCOPY);  Surgeon: Tapan Stevenson MD;  Location: [facility];  Service: Endoscopy;  Laterality: N/A;  w/dilation  fully vaccinated, instructions mailed-[facility]  11/4 pt called did not receive instructions, does not have  "portal or email, went over prep instructions and medication instructions with pt on the phone -LW    ESOPHAGOGASTRODUODENOSCOPY N/A 9/19/2023    Procedure: EGD (ESOPHAGOGASTRODUODENOSCOPY);  Surgeon: Miller Phillips MD;  Location: [facility] ENDO;  Service: Endoscopy;  Laterality: N/A;  botox injection  inst mailed    EYE SURGERY Bilateral 06/08/2018    cataract     LEFT HEART CATHETERIZATION Left 8/13/2020    Procedure: Left heart cath, rra, noon;  Surgeon: Guevara Skelton MD;  Location: [facility] CATH LAB;  Service: Cardiology;  Laterality: Left;  RN PREOP 8/7/2020---COVID NEGATIVE ON 8/12    PORTACATH PLACEMENT Right 01/2018    sweat glands axillary regions Bilateral     TONSILLECTOMY         Social History: Patient lives with w/ family.    Prior Intubation HX:  None    Modified Barium Swallow: None per EMR    Chest X-Rays:   5/21/24    FINDINGS:  Lines and tubes: Right chest wall Port-A-Cath.     Heart and mediastinum: Unchanged.     Pleura: No pleural effusion or pneumothorax.     Lungs: Lungs are well inflated. Bandlike opacity in the right midlung zone, likely atelectasis or scarring based on recent CT appearance.  No new consolidation.     Soft tissue/bone: Osteopenia and degenerative changes.    Prior diet: "softer foods", per pt 2/2 hx of esophageal dilation.    Subjective     ST obtained clearance from pt's nurse for b/s swallow evaluation prior to entrance. Pt was awake and alert, consuming breakfast tray upon ST's entrance. Pt greeted ST and was able to recall all events that led to admit, w/ speech 100% intelligible at the conversational level. Pt stating consuming soft foods prior to admit 2/2 hx of esophogeal dilation and recent Botox injection in [facility].     Patient goals: Cont po intake      Pain/Comfort:  Pain Rating 1: 0/10    Respiratory Status: Room air    Objective:     Oral Musculature Evaluation  Oral Musculature: WFL (Hx of L-facial weakness 2/2 Bell's Palsy)  Dentition: scattered " dentition  Secretion Management: adequate  Mucosal Quality: good  Oral Labial Strength and Mobility: WFL  Lingual Strength and Mobility: WFL  Volitional Cough: Weak  Volitional Swallow: Intact  Voice Prior to PO Intake: Clear and audible    Bedside Swallow Eval:   Consistencies Assessed:  Thin liquids -Straw sips of water  Puree -Tbsp bites of grits  Soft solids -Tbsp bites of eggs and sausage       Oral Phase:   Prolonged mastication    Pharyngeal Phase:   no overt clinical signs/symptoms of aspiration  no overt clinical signs/symptoms of pharyngeal dysphagia    Compensatory Strategies  None    Treatment: It should be noted that silent aspiration cannot be r/o via b/s assessment. ST provided education to the pt which included diet recs of cont current diet, as well as no further ST required at this time, as the pt is consuming the safest and least restrictive diet. Pt verbalized an understanding of all information given and was agreeable to all recs.       Goals:   Multidisciplinary Problems       SLP Goals       Not on file                    Plan:     Patient to be seen:      Plan of Care expires:     Plan of Care reviewed with:  patient   SLP Follow-Up:  No       Discharge recommendations:      Barriers to Discharge:  None    Time Tracking:     SLP Treatment Date:   05/22/24  Speech Start Time:  0915  Speech Stop Time:  0925     Speech Total Time (min):  10 min    Billable Minutes: Eval Swallow and Oral Function 10    05/22/2024

## 2024-05-23 NOTE — DISCHARGE SUMMARY
[facility] Medicine  Discharge Summary      Patient Name: Ade Castellano  MRN: [id number]  DWAIN: [id number]  Patient Class: OP- Observation  Admission Date: 5/21/2024  Hospital Length of Stay: 0 days  Discharge Date and Time: 5/22/2024  6:10 PM  Attending Physician: No att. providers found   Discharging Provider: Daren Armando PA-C  Primary Care Provider: Jeannine Huitron MD    Primary Care Team: Networked reference to record PCT     HPI:   78 y.o. female with CAD, COPD, and cancer of the cervix, lung, breast, and lymph nodes presents with a complaint of dizziness.  Acute onset, duration 3 days, associated with frontal headache, slurred speech, blurry vision and feeling her body pulling to the right.  Left facial droop is chronic due to Bell's palsy and unchanged.  Denies fever, chills, cough, chest pain, syncope.  In the ED, workup unremarkable for acute abnormality.  MRI brain pending.  Placed in observation.    * No surgery found *      Hospital Course:   Ade Castellano with a pmh of CAD, COPD, history of MI (2006-stents), history of Bell's palsy with left facial droop (chronic) and cancer of cervix, lung, breast, and lymph nodes was placed under observation for further medical management of her dizziness and possible TIA workup. Patient with blurry vision, slurred speech, blurry vision, and feeling her body pulling to the right for the past few days. Patients CTA head was unremarkable with no evidence of proximal significant stenosis or large vessel occlusion. CTA neck showed atherosclerotic plaquing of the carotid bifurcations and proximal ICAs with less than 50% proximal ICA stenosis. CT head showed no evidence of acute intracranial hemorrhage or definite abnormal parenchymal enhancement. Patient was placed under observation for MRI brain and neurology evaluation. Patients labs were fairly unremarkable except lipid profile showing elevated triglycerides 178. Echocardiogram with  bubble study showed normal systolic function with EF 65-70% with grade 1 dd. MRI brain without contrast showed no acute intracranial abnormality. Neurology evaluation concluded that patients symptoms concerning for vascular etiology and mentions that at time posterior circulation infarcts can take up to 5 days to appear on MRI and therefore will treat accordingly. Patients etiology appears to be secondary to small vessel disease. Recommendations were to continue Aspirin 81 mg daily, plavix 75 mg for 21 days followed by ASA 81 mg monotherapy thereafter. Patient also will be started on high intensity statin atorvastatin 40 mg daily. Neurology discussed goal parameters for TIA/stroke with patient: LDL <70 mg/dl, HbA1C <7 %, and SBP <130/80 along with diet and exercise with lifestyle modifications. PT/OT evaluated patient and recommended low intensity therapy. Patients condition discussed at length with patient and explained precautions I.e to move slowly and avoid quick movements when getting up to prevent syncopal episodes. Patient understood and had no further questions.    All findings and plan were explained to the patient. All questions and concerns were answered. Patient verbalized understanding. Patient is in stable condition to d/c home and has been informed to follow up with her PCP within the next 7-10 days to discuss her observation stay and to follow-up with Neurology. Patient has been educated to return to the ED if she experiences any further chest pain, lightheadness, dizziness, blurry vision, weakness, or discomfort.      Goals of Care Treatment Preferences:  Code Status: Full Code    Living Will: Yes              Consults:   Consults (From admission, onward)          Status Ordering Provider     Inpatient consult to Registered Dietitian/Nutritionist  Once        Provider:  (Not yet assigned)    Completed JOE FRANCOIS JR     IP consult to case management/social work  Once        Provider:  (Not yet  assigned)    Completed JOE FRANCOIS JR     Inpatient consult to Neurology  Once        Provider:  Birdie Matta MD    Completed JOE FRANCOIS JR     Consult to Telemedicine - Acute Stroke  Once        Provider:  Anirudh Garber MD    Completed PINA GARCIA            No new Assessment & Plan notes have been filed under this hospital service since the last note was generated.  Service: Hospital Medicine    Final Active Diagnoses:    Diagnosis Date Noted POA    PRINCIPAL PROBLEM:  TIA (transient ischemic attack) [G45.9] 05/21/2024 Yes    Hypertension [I10] 10/10/2019 Yes     Chronic    Centrilobular emphysema [J43.2] 02/06/2018 Yes     Chronic    Coronary artery disease of native artery of native heart with stable angina pectoris [I25.118] 08/23/2013 Yes     Chronic      Problems Resolved During this Admission:       Discharged Condition: stable    Disposition: Home or Self Care    Follow Up:    Patient Instructions:      Ambulatory referral/consult to Neurology   Standing Status: Future   Referral Priority: Routine Referral Type: Consultation   Referral Reason: Specialty Services Required   Requested Specialty: Neurology   Number of Visits Requested: 1       Significant Diagnostic Studies: Labs: CMP   Recent Labs   Lab 05/21/24  1430 05/22/24  0454    141   K 3.9 3.7    105   CO2 22* 22*   GLU 94 97   BUN 18 14   CREATININE 1.0 0.8   CALCIUM 9.2 9.5   PROT 6.4 6.0   ALBUMIN 3.5 3.5   BILITOT 0.4 0.5   ALKPHOS 42* 40*   AST 17 15   ALT 18 13   ANIONGAP 9 14   , CBC   Recent Labs   Lab 05/21/24  1430 05/22/24  0454   WBC 4.81 7.37   HGB 12.5 12.6   HCT 38.2 40.5    145*   , Lipid Panel   Lab Results   Component Value Date    CHOL 156 05/21/2024    HDL 58 05/21/2024    LDLCALC 62.4 (L) 05/21/2024    TRIG 178 (H) 05/21/2024    CHOLHDL 37.2 05/21/2024   , Troponin   Recent Labs   Lab 05/21/24  1430   TROPONINI 0.010   , and A1C:   Recent Labs   Lab 05/22/24  0454   HGBA1C 5.7*      Radiology:     Echo Saline Bubble? Yes    No intracardiac source of embolus noted on this exam.  If high clinical   suspicion, consider MORELIA +/- bubble study.    Left Ventricle: The left ventricle is normal in size. Normal wall   thickness. There is normal systolic function with a visually estimated   ejection fraction of 65 - 70%. Grade I diastolic dysfunction.    Right Ventricle: Normal right ventricular cavity size. Systolic   function is normal.    Left Atrium: Left atrium is mildly dilated. Agitated saline study of   the atrial septum is negative after vasalva maneuver, suggesting absence   of intracardiac shunt at the atrial level.    Aortic Valve: The aortic valve is a trileaflet valve. There is moderate   aortic valve sclerosis. Mildly restricted motion. There is mild to   moderate stenosis. Aortic valve area by VTI is 1.22 cm². Aortic valve peak   velocity is 2.96 m/s. Mean gradient is 22 mmHg. The dimensionless index is   0.42.    Pulmonary Artery: The estimated pulmonary artery systolic pressure is   33 mmHg.       Pending Diagnostic Studies:       None           Medications:  Reconciled Home Medications:      Medication List        START taking these medications      atorvastatin 40 MG tablet  Commonly known as: LIPITOR  Take 1 tablet (40 mg total) by mouth once daily.     clopidogreL 75 mg tablet  Commonly known as: PLAVIX  Take 1 tablet (75 mg total) by mouth once daily. for 20 doses            CONTINUE taking these medications      albuterol 2.5 mg /3 mL (0.083 %) nebulizer solution  Commonly known as: PROVENTIL  NEBULIZE 1 vial EVERY 6 HOURS AS NEEDED FOR WHEEZING     aluminum & magnesium hydroxide-simethicone 400-400-40 mg/5 mL suspension  Commonly known as: MYLANTA MAXIMUM STRENGTH  Take 10 mLs by mouth every 6 (six) hours as needed for Indigestion.     aspirin 81 MG EC tablet  Commonly known as: ECOTRIN  Take 81 mg by mouth once daily.     diclofenac 50 MG EC tablet  Commonly known as:  VOLTAREN  Take 50 mg by mouth 2 (two) times daily.     fluticasone propionate 50 mcg/actuation nasal spray  Commonly known as: FLONASE  SHAKE WELL & USE TWO SPRAYS EACH NOSTRIL ONCE A DAY     isosorbide mononitrate 30 MG 24 hr tablet  Commonly known as: IMDUR  TAKE 1 TABLET BY MOUTH EVERY DAY. Hold if SBP <120     levocetirizine 5 MG tablet  Commonly known as: XYZAL  TAKE 1 TABLET BY MOUTH EVERY DAY IN THE EVENING FOR ALLERGIES     metoprolol tartrate 25 MG tablet  Commonly known as: LOPRESSOR  Take 1 tablet (25 mg total) by mouth 2 (two) times daily. FOR BLOOD PRESSURE     nitroGLYCERIN 0.4 MG SL tablet  Commonly known as: NITROSTAT  PLACE 1 TAB UNDER TONGUE EVERY 5 MINUTES x 3 DOSES AS NEEDED FOR CHEST PAIN. IF NOT RESOLVED CALL 911     pantoprazole 40 MG tablet  Commonly known as: PROTONIX  Take 1 tablet (40 mg total) by mouth once daily.     TRELEGY ELLIPTA 100-62.5-25 mcg Dsdv  Generic drug: fluticasone-umeclidin-vilanter  INHALE ONE PUFF INTO THE LUNGS ONCE A DAY            STOP taking these medications      rosuvastatin 10 MG tablet  Commonly known as: CRESTOR            ASK your doctor about these medications      acetaminophen 650 MG Tbsr  Commonly known as: TYLENOL  Take 650 mg by mouth every 8 (eight) hours.              Indwelling Lines/Drains at time of discharge:   Lines/Drains/Airways       Central Venous Catheter Line  Duration             Port A Cath Single Lumen 03/07/18 right subclavian 2268 days                    Time spent on the discharge of patient: 45 minutes       Daren Armando PA-C  Department of Hospital Medicine  [facility] [place] - Observation

## 2024-05-23 NOTE — HOSPITAL COURSE
Ade Castellano with a pmh of CAD, COPD, history of MI (2006-stents), history of Bell's palsy with left facial droop (chronic) and cancer of cervix, lung, breast, and lymph nodes was placed under observation for further medical management of her dizziness and possible TIA workup. Patient with blurry vision, slurred speech, blurry vision, and feeling her body pulling to the right for the past few days. Patients CTA head was unremarkable with no evidence of proximal significant stenosis or large vessel occlusion. CTA neck showed atherosclerotic plaquing of the carotid bifurcations and proximal ICAs with less than 50% proximal ICA stenosis. CT head showed no evidence of acute intracranial hemorrhage or definite abnormal parenchymal enhancement. Patient was placed under observation for MRI brain and neurology evaluation. Patients labs were fairly unremarkable except lipid profile showing elevated triglycerides 178. Echocardiogram with bubble study showed normal systolic function with EF 65-70% with grade 1 dd. MRI brain without contrast showed no acute intracranial abnormality. Neurology evaluation concluded that patients symptoms concerning for vascular etiology and mentions that at time posterior circulation infarcts can take up to 5 days to appear on MRI and therefore will treat accordingly. Patients etiology appears to be secondary to small vessel disease. Recommendations were to continue Aspirin 81 mg daily, plavix 75 mg for 21 days followed by ASA 81 mg monotherapy thereafter. Patient also will be started on high intensity statin atorvastatin 40 mg daily. Neurology discussed goal parameters for TIA/stroke with patient: LDL <70 mg/dl, HbA1C <7 %, and SBP <130/80 along with diet and exercise with lifestyle modifications. PT/OT evaluated patient and recommended low intensity therapy. Patients condition discussed at length with patient and explained precautions I.e to move slowly and avoid quick movements when  getting up to prevent syncopal episodes. Patient understood and had no further questions.    All findings and plan were explained to the patient. All questions and concerns were answered. Patient verbalized understanding. Patient is in stable condition to d/c home and has been informed to follow up with her PCP within the next 7-10 days to discuss her observation stay and to follow-up with Neurology. Patient has been educated to return to the ED if she experiences any further chest pain, lightheadness, dizziness, blurry vision, weakness, or discomfort.

## 2024-05-24 ENCOUNTER — PATIENT OUTREACH (OUTPATIENT)
Dept: ADMINISTRATIVE | Facility: CLINIC | Age: 79
End: 2024-05-24
Payer: MEDICARE

## 2024-05-24 NOTE — PROGRESS NOTES
C3 nurse spoke with Ade Castellano for a TCC post hospital discharge follow up call. The patient has a scheduled HOSFU appointment with Jeannine Huitron MD on 05/31/2024 @ 1000.

## 2024-05-31 ENCOUNTER — OFFICE VISIT (OUTPATIENT)
Dept: FAMILY MEDICINE | Facility: CLINIC | Age: 79
End: 2024-05-31
Payer: MEDICARE

## 2024-05-31 VITALS
SYSTOLIC BLOOD PRESSURE: 114 MMHG | HEART RATE: 55 BPM | TEMPERATURE: 98 F | DIASTOLIC BLOOD PRESSURE: 62 MMHG | WEIGHT: 158.94 LBS | HEIGHT: 62 IN | BODY MASS INDEX: 29.25 KG/M2 | OXYGEN SATURATION: 97 %

## 2024-05-31 DIAGNOSIS — Z09 HOSPITAL DISCHARGE FOLLOW-UP: Primary | ICD-10-CM

## 2024-05-31 DIAGNOSIS — G45.9 TIA (TRANSIENT ISCHEMIC ATTACK): ICD-10-CM

## 2024-05-31 DIAGNOSIS — J41.0 SIMPLE CHRONIC BRONCHITIS: ICD-10-CM

## 2024-05-31 PROCEDURE — 99999 PR PBB SHADOW E&M-EST. PATIENT-LVL III: CPT | Mod: PBBFAC,HCNC,, | Performed by: FAMILY MEDICINE

## 2024-05-31 PROCEDURE — 1126F AMNT PAIN NOTED NONE PRSNT: CPT | Mod: HCNC,CPTII,S$GLB, | Performed by: FAMILY MEDICINE

## 2024-05-31 PROCEDURE — 3074F SYST BP LT 130 MM HG: CPT | Mod: HCNC,CPTII,S$GLB, | Performed by: FAMILY MEDICINE

## 2024-05-31 PROCEDURE — 99214 OFFICE O/P EST MOD 30 MIN: CPT | Mod: HCNC,S$GLB,, | Performed by: FAMILY MEDICINE

## 2024-05-31 PROCEDURE — 3288F FALL RISK ASSESSMENT DOCD: CPT | Mod: HCNC,CPTII,S$GLB, | Performed by: FAMILY MEDICINE

## 2024-05-31 PROCEDURE — 1159F MED LIST DOCD IN RCRD: CPT | Mod: HCNC,CPTII,S$GLB, | Performed by: FAMILY MEDICINE

## 2024-05-31 PROCEDURE — 1157F ADVNC CARE PLAN IN RCRD: CPT | Mod: HCNC,CPTII,S$GLB, | Performed by: FAMILY MEDICINE

## 2024-05-31 PROCEDURE — 3078F DIAST BP <80 MM HG: CPT | Mod: HCNC,CPTII,S$GLB, | Performed by: FAMILY MEDICINE

## 2024-05-31 PROCEDURE — 1101F PT FALLS ASSESS-DOCD LE1/YR: CPT | Mod: HCNC,CPTII,S$GLB, | Performed by: FAMILY MEDICINE

## 2024-05-31 RX ORDER — DOXYCYCLINE HYCLATE 100 MG
100 TABLET ORAL 2 TIMES DAILY
Qty: 14 TABLET | Refills: 0 | Status: SHIPPED | OUTPATIENT
Start: 2024-05-31 | End: 2024-06-07

## 2024-05-31 NOTE — PROGRESS NOTES
Subjective     Patient ID: Ade Castellano is a 78 y.o. female.    Chief Complaint: Hospital Follow Up    78 year old female presents for hospital follow up. She had a ministroke last week.s he had imaging done and is doing well. She is trying to get off the salt now. She has some fatigue, rhinorrhea, cough, and sinus congestion. She has no fever or chills. She is using her trelegy.     Hospital Course:   Ade Castellano with a pmh of CAD, COPD, history of MI (2006-stents), history of Bell's palsy with left facial droop (chronic) and cancer of cervix, lung, breast, and lymph nodes was placed under observation for further medical management of her dizziness and possible TIA workup. Patient with blurry vision, slurred speech, blurry vision, and feeling her body pulling to the right for the past few days. Patients CTA head was unremarkable with no evidence of proximal significant stenosis or large vessel occlusion. CTA neck showed atherosclerotic plaquing of the carotid bifurcations and proximal ICAs with less than 50% proximal ICA stenosis. CT head showed no evidence of acute intracranial hemorrhage or definite abnormal parenchymal enhancement. Patient was placed under observation for MRI brain and neurology evaluation. Patients labs were fairly unremarkable except lipid profile showing elevated triglycerides 178. Echocardiogram with bubble study showed normal systolic function with EF 65-70% with grade 1 dd. MRI brain without contrast showed no acute intracranial abnormality. Neurology evaluation concluded that patients symptoms concerning for vascular etiology and mentions that at time posterior circulation infarcts can take up to 5 days to appear on MRI and therefore will treat accordingly. Patients etiology appears to be secondary to small vessel disease. Recommendations were to continue Aspirin 81 mg daily, plavix 75 mg for 21 days followed by ASA 81 mg monotherapy thereafter. Patient also will be started on  high intensity statin atorvastatin 40 mg daily. Neurology discussed goal parameters for TIA/stroke with patient: LDL <70 mg/dl, HbA1C <7 %, and SBP <130/80 along with diet and exercise with lifestyle modifications. PT/OT evaluated patient and recommended low intensity therapy. Patients condition discussed at length with patient and explained precautions I.e to move slowly and avoid quick movements when getting up to prevent syncopal episodes. Patient understood and had no further questions.     All findings and plan were explained to the patient. All questions and concerns were answered. Patient verbalized understanding. Patient is in stable condition to d/c home and has been informed to follow up with her PCP within the next 7-10 days to discuss her observation stay and to follow-up with Neurology. Patient has been educated to return to the ED if she experiences any further chest pain, lightheadness, dizziness, blurry vision, weakness, or discomfort.          Past Medical History:   Diagnosis Date    Abnormal stress test 8/5/2020    Acute respiratory failure with hypoxia and hypercapnia 11/29/2017    Angina pectoris     Arthritis     Bell's palsy     left facial weakness    Breast cancer     RIGHT    CAD (coronary artery disease)     Cervical cancer     Chronic bronchitis     Chronic heart failure with preserved ejection fraction 8/5/2020    Chronic heart failure with preserved ejection fraction 8/5/2020    COPD (chronic obstructive pulmonary disease)     Dr. Katz    Dental bridge present     Emphysema of lung     H/O colonoscopy 06/2017    due for repeat colonsocopy in 6/2018    History of Bell's palsy     History of heart artery stent     Dr. Ortiz  x2 stents    Hyperlipidemia     Hypertension     Lung cancer     Myocardial infarction     SUNITHA (obstructive sleep apnea)     intolerant to mask    SUNITHA (obstructive sleep apnea)     intolerant to mask     Pneumonia     Pneumonia due to other staphylococcus     PUD  (peptic ulcer disease)     Severe anemia 6/11/2018    Sleep apnea     Vaginal delivery     x1      Past Surgical History:   Procedure Laterality Date    ADENOIDECTOMY      BREAST BIOPSY Right     x3    BREAST LUMPECTOMY      BREAST SURGERY      lumpectomy right side     CERVIX SURGERY      cone    COLONOSCOPY N/A 3/17/2017    Procedure: COLONOSCOPY;  Surgeon: Julio Rudd MD;  Location: [facility] ENDO;  Service: Endoscopy;  Laterality: N/A;    COLONOSCOPY N/A 6/30/2017    Procedure: COLONOSCOPY;  Surgeon: Julio Rudd MD;  Location: [facility] ENDO;  Service: Endoscopy;  Laterality: N/A;    COLONOSCOPY N/A 11/28/2018    Procedure: COLONOSCOPY;  Surgeon: Nura Urbina MD;  Location: [facility] ENDO;  Service: Endoscopy;  Laterality: N/A;    COLONOSCOPY N/A 1/29/2019    Procedure: COLONOSCOPY;  Surgeon: Emmanuel Perez MD;  Location: [facility];  Service: Endoscopy;  Laterality: N/A;  confirmed appt-SP    COLONOSCOPY N/A 7/26/2022    Procedure: COLONOSCOPY;  Surgeon: James Healy MD;  Location: [facility];  Service: Endoscopy;  Laterality: N/A;  fully vaccinated -  mediport in [facility] -     CORONARY ANGIOPLASTY WITH STENT PLACEMENT      ESOPHAGEAL MANOMETRY WITH MEASUREMENT OF IMPEDANCE N/A 7/10/2023    Procedure: MANOMETRY, ESOPHAGUS, WITH IMPEDANCE MEASUREMENT;  Surgeon: Miller Phillips MD;  [facility]  Service: Gastroenterology;  Laterality: N/A;  pt on oxygen 2.5L  pt requested Tuesday only  5/31 referred by Dr. Miller Phillips/instr. mailed-  7/3-r/s, updated instructions reviewed with pt in detail via phone, pt verbalized understanding-KPvt    ESOPHAGOGASTRODUODENOSCOPY N/A 1/25/2021    Procedure: EGD (ESOPHAGOGASTRODUODENOSCOPY);  Surgeon: Dmitry Gonzalez MD;  Location: [facility];  Service: Endoscopy;  Laterality: N/A;  rapid test >50 miles -ml    ESOPHAGOGASTRODUODENOSCOPY N/A 11/8/2022    Procedure: EGD (ESOPHAGOGASTRODUODENOSCOPY);  Surgeon: Tapan Stevenson MD;  Location: [facility];  Service:  Endoscopy;  Laterality: N/A;  w/dilation  fully vaccinated, instructions mailed-Kpvt   pt called did not receive instructions, does not have portal or email, went over prep instructions and medication instructions with pt on the phone -LW    ESOPHAGOGASTRODUODENOSCOPY N/A 2023    Procedure: EGD (ESOPHAGOGASTRODUODENOSCOPY);  Surgeon: Miller Phillips MD;  Location: [facility] ENDO;  Service: Endoscopy;  Laterality: N/A;  botox injection  inst mailed    EYE SURGERY Bilateral 2018    cataract     LEFT HEART CATHETERIZATION Left 2020    Procedure: Left heart cath, rra, noon;  Surgeon: Guevara Skelton MD;  Location: [facility] CATH LAB;  Service: Cardiology;  Laterality: Left;  RN PREOP 2020---COVID NEGATIVE ON     PORTACATH PLACEMENT Right 2018    sweat glands axillary regions Bilateral     TONSILLECTOMY       Family History   Problem Relation Name Age of Onset    Cancer Mother          breast    Heart disease Mother      Breast cancer Mother      Cancer Father          lung-smoker     Cancer Sister          lung-smoker     Heart attack Sister      Cancer Maternal Grandmother      Heart disease Maternal Grandmother      Cancer Maternal Grandfather      Heart disease Maternal Grandfather      Cancer Paternal Grandmother      Cancer Paternal Grandfather      Cancer Sister          mets not sure where it started     COPD Sister      Heart disease Sister      Kidney cancer Son      Colon cancer Neg Hx      Esophageal cancer Neg Hx       Social History     Socioeconomic History    Marital status: Single   Tobacco Use    Smoking status: Former     Current packs/day: 0.00     Average packs/day: 0.3 packs/day for 50.0 years (12.5 ttl pk-yrs)     Types: Cigarettes     Start date: 1967     Quit date: 2017     Years since quittin.5    Smokeless tobacco: Never    Tobacco comments:     7 years since smoked    Substance and Sexual Activity    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     Drug  "use: No    Sexual activity: Not Currently     Social Determinants of Health     Financial Resource Strain: Low Risk  (5/21/2024)    Overall Financial Resource Strain (CARDIA)     Difficulty of Paying Living Expenses: Not hard at all   Food Insecurity: No Food Insecurity (5/21/2024)    Hunger Vital Sign     Worried About Running Out of Food in the Last Year: Never true     Ran Out of Food in the Last Year: Never true   Transportation Needs: No Transportation Needs (5/21/2024)    TRANSPORTATION NEEDS     Transportation : No   Physical Activity: Inactive (3/7/2023)    Exercise Vital Sign     Days of Exercise per Week: 0 days     Minutes of Exercise per Session: 0 min   Stress: No Stress Concern Present (5/21/2024)    [employer] - Occupational Stress Questionnaire     Feeling of Stress : Only a little   Housing Stability: Low Risk  (5/21/2024)    Housing Stability Vital Sign     Unable to Pay for Housing in the Last Year: No     Homeless in the Last Year: No       Review of Systems       Objective   Vitals:    05/31/24 0932   BP: 114/62   Pulse: (!) 55   Temp: 97.7 °F (36.5 °C)   TempSrc: Oral   SpO2: 97%   Weight: 72.1 kg (158 lb 15.2 oz)   Height: 5' 2" (1.575 m)      Physical Exam  Constitutional:       General: She is not in acute distress.     Appearance: Normal appearance. She is well-developed. She is not ill-appearing, toxic-appearing or diaphoretic.   HENT:      Head: Normocephalic and atraumatic.   Eyes:      Conjunctiva/sclera: Conjunctivae normal.   Neck:      Vascular: No carotid bruit.   Cardiovascular:      Rate and Rhythm: Normal rate and regular rhythm.      Heart sounds: Murmur heard.      No friction rub. No gallop.   Pulmonary:      Effort: Pulmonary effort is normal. No respiratory distress.      Breath sounds: No stridor. Wheezing present. No rhonchi or rales.   Musculoskeletal:      Cervical back: Normal range of motion and neck supple.   Neurological:      Mental " Status: She is alert and oriented to person, place, and time.            Assessment and Plan     1. Hospital discharge follow-up    2. Simple chronic bronchitis  -     doxycycline (VIBRA-TABS) 100 MG tablet; Take 1 tablet (100 mg total) by mouth 2 (two) times daily. for 7 days  Dispense: 14 tablet; Refill: 0    3. TIA (transient ischemic attack)  On plavix.aspirin, lipitor 40 mg                 No follow-ups on file.

## 2024-06-06 DIAGNOSIS — S39.012A LUMBAR STRAIN, INITIAL ENCOUNTER: ICD-10-CM

## 2024-06-06 NOTE — TELEPHONE ENCOUNTER
No care due was identified.  [facility] Embedded Care Due Messages. Reference number: [id number].   6/06/2024 3:02:58 PM CDT

## 2024-06-07 RX ORDER — METHOCARBAMOL 500 MG/1
500 TABLET, FILM COATED ORAL 2 TIMES DAILY PRN
Qty: 20 TABLET | Refills: 0 | Status: SHIPPED | OUTPATIENT
Start: 2024-06-07 | End: 2024-06-17

## 2024-06-17 ENCOUNTER — OFFICE VISIT (OUTPATIENT)
Dept: HEMATOLOGY/ONCOLOGY | Facility: CLINIC | Age: 79
End: 2024-06-17
Payer: MEDICARE

## 2024-06-17 ENCOUNTER — INFUSION (OUTPATIENT)
Dept: INFUSION THERAPY | Facility: HOSPITAL | Age: 79
End: 2024-06-17
Attending: INTERNAL MEDICINE
Payer: MEDICARE

## 2024-06-17 VITALS
OXYGEN SATURATION: 95 % | SYSTOLIC BLOOD PRESSURE: 108 MMHG | BODY MASS INDEX: 28.44 KG/M2 | HEART RATE: 51 BPM | HEIGHT: 63 IN | DIASTOLIC BLOOD PRESSURE: 72 MMHG | WEIGHT: 160.5 LBS

## 2024-06-17 VITALS
SYSTOLIC BLOOD PRESSURE: 143 MMHG | OXYGEN SATURATION: 98 % | TEMPERATURE: 98 F | DIASTOLIC BLOOD PRESSURE: 67 MMHG | RESPIRATION RATE: 16 BRPM | HEART RATE: 51 BPM

## 2024-06-17 DIAGNOSIS — C50.919 RECURRENT BREAST CANCER, UNSPECIFIED LATERALITY: ICD-10-CM

## 2024-06-17 DIAGNOSIS — Z85.118 HISTORY OF LUNG CANCER: ICD-10-CM

## 2024-06-17 DIAGNOSIS — Z86.73 HISTORY OF TIA (TRANSIENT ISCHEMIC ATTACK): ICD-10-CM

## 2024-06-17 DIAGNOSIS — E83.42 HYPOMAGNESEMIA: ICD-10-CM

## 2024-06-17 DIAGNOSIS — J44.9 CHRONIC OBSTRUCTIVE PULMONARY DISEASE, UNSPECIFIED COPD TYPE: ICD-10-CM

## 2024-06-17 DIAGNOSIS — C34.90 SQUAMOUS CELL CARCINOMA LUNG: Primary | ICD-10-CM

## 2024-06-17 DIAGNOSIS — C50.919 RECURRENT BREAST CANCER, UNSPECIFIED LATERALITY: Primary | ICD-10-CM

## 2024-06-17 LAB
ALBUMIN SERPL BCP-MCNC: 3.4 G/DL (ref 3.5–5.2)
ALP SERPL-CCNC: 42 U/L (ref 55–135)
ALT SERPL W/O P-5'-P-CCNC: 16 U/L (ref 10–44)
ANION GAP SERPL CALC-SCNC: 8 MMOL/L (ref 8–16)
AST SERPL-CCNC: 18 U/L (ref 10–40)
BASOPHILS # BLD AUTO: 0.07 K/UL (ref 0–0.2)
BASOPHILS NFR BLD: 1.6 % (ref 0–1.9)
BILIRUB SERPL-MCNC: 0.5 MG/DL (ref 0.1–1)
BUN SERPL-MCNC: 23 MG/DL (ref 8–23)
CALCIUM SERPL-MCNC: 9.1 MG/DL (ref 8.7–10.5)
CHLORIDE SERPL-SCNC: 107 MMOL/L (ref 95–110)
CO2 SERPL-SCNC: 26 MMOL/L (ref 23–29)
CREAT SERPL-MCNC: 1 MG/DL (ref 0.5–1.4)
DIFFERENTIAL METHOD BLD: ABNORMAL
EOSINOPHIL # BLD AUTO: 0.3 K/UL (ref 0–0.5)
EOSINOPHIL NFR BLD: 6.7 % (ref 0–8)
ERYTHROCYTE [DISTWIDTH] IN BLOOD BY AUTOMATED COUNT: 14 % (ref 11.5–14.5)
EST. GFR  (NO RACE VARIABLE): 58 ML/MIN/1.73 M^2
GLUCOSE SERPL-MCNC: 107 MG/DL (ref 70–110)
HCT VFR BLD AUTO: 39.3 % (ref 37–48.5)
HGB BLD-MCNC: 12.1 G/DL (ref 12–16)
IMM GRANULOCYTES # BLD AUTO: 0.01 K/UL (ref 0–0.04)
IMM GRANULOCYTES NFR BLD AUTO: 0.2 % (ref 0–0.5)
LYMPHOCYTES # BLD AUTO: 1.1 K/UL (ref 1–4.8)
LYMPHOCYTES NFR BLD: 26.1 % (ref 18–48)
MAGNESIUM SERPL-MCNC: 1.5 MG/DL (ref 1.6–2.6)
MCH RBC QN AUTO: 29.2 PG (ref 27–31)
MCHC RBC AUTO-ENTMCNC: 30.8 G/DL (ref 32–36)
MCV RBC AUTO: 95 FL (ref 82–98)
MONOCYTES # BLD AUTO: 0.5 K/UL (ref 0.3–1)
MONOCYTES NFR BLD: 10.3 % (ref 4–15)
NEUTROPHILS # BLD AUTO: 2.4 K/UL (ref 1.8–7.7)
NEUTROPHILS NFR BLD: 55.1 % (ref 38–73)
NRBC BLD-RTO: 0 /100 WBC
PLATELET # BLD AUTO: 168 K/UL (ref 150–450)
PMV BLD AUTO: 10.1 FL (ref 9.2–12.9)
POTASSIUM SERPL-SCNC: 3.9 MMOL/L (ref 3.5–5.1)
PROT SERPL-MCNC: 5.9 G/DL (ref 6–8.4)
RBC # BLD AUTO: 4.14 M/UL (ref 4–5.4)
SODIUM SERPL-SCNC: 141 MMOL/L (ref 136–145)
WBC # BLD AUTO: 4.36 K/UL (ref 3.9–12.7)

## 2024-06-17 PROCEDURE — 85025 COMPLETE CBC W/AUTO DIFF WBC: CPT | Mod: HCNC | Performed by: INTERNAL MEDICINE

## 2024-06-17 PROCEDURE — 83735 ASSAY OF MAGNESIUM: CPT | Mod: HCNC | Performed by: INTERNAL MEDICINE

## 2024-06-17 PROCEDURE — 63600175 PHARM REV CODE 636 W HCPCS: Mod: HCNC | Performed by: INTERNAL MEDICINE

## 2024-06-17 PROCEDURE — 80053 COMPREHEN METABOLIC PANEL: CPT | Mod: HCNC | Performed by: INTERNAL MEDICINE

## 2024-06-17 PROCEDURE — 96523 IRRIG DRUG DELIVERY DEVICE: CPT | Mod: HCNC

## 2024-06-17 PROCEDURE — 99999 PR PBB SHADOW E&M-EST. PATIENT-LVL IV: CPT | Mod: PBBFAC,HCNC,, | Performed by: INTERNAL MEDICINE

## 2024-06-17 RX ORDER — HEPARIN 100 UNIT/ML
500 SYRINGE INTRAVENOUS
Status: DISCONTINUED | OUTPATIENT
Start: 2024-06-17 | End: 2024-06-17 | Stop reason: HOSPADM

## 2024-06-17 RX ORDER — ALBUTEROL SULFATE 90 UG/1
2 AEROSOL, METERED RESPIRATORY (INHALATION) EVERY 6 HOURS PRN
COMMUNITY

## 2024-06-17 RX ORDER — LANOLIN ALCOHOL/MO/W.PET/CERES
400 CREAM (GRAM) TOPICAL DAILY
Qty: 15 TABLET | Refills: 0 | Status: SHIPPED | OUTPATIENT
Start: 2024-06-17 | End: 2024-07-02

## 2024-06-17 RX ADMIN — HEPARIN 500 UNITS: 100 SYRINGE at 08:06

## 2024-06-17 NOTE — PROGRESS NOTES
Subjective:       Patient ID: Ade Castellano is a 78 y.o. female.    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 The PET(+) disease in the right axilla and right supraclavicular fossa will be boosted at 200 cGy per fraction to 6000 cGy total dose.  S/p LYMPH NODE, RIGHT AXILLA, BIOPSY: 6/16/21 . Metastatic poorly-differentiated carcinoma, c/w breast primary ERnegPRneg Her2 neg  PD-L1 (22C3) IHC CPS> is greater than or equal to 10  Pembrolizumab 7/22/21 -6/15/22         Prior Hx:  79 y/o female with history of  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  And Rt  breast Haja/p  cycle 6 of carboplatin/Abraxane 7/2/2018   She has  History of Stage 1A  Rt breast cancer Grade 3, ER/IL neg , Her 2 jose maria neg s/p lumpectomy 7/11/2014 and s/p adjuvant RT 9/2014 Pt declined Adjuvant chemo.Pathology showed a 1.15 cm, grade 3 infiltrating ductal carcinoma.  One sentinel lymph node was negative for malignancy.  Margins were negative and the closest margin was 1 cm.  She was staged  pT1c  pN0 cM0 stage IA.  She declined adjuvant chemo therapy.  She completed post operative radiation therapy at 400 cGy per fraction to 4000 cGy 9/2014  Pt hospitalized 11/2017 for  acute on chronic respiratory failure requiring intubation and ventilation. Has large lung mass in right lung with probable post obstructive pneumonia resulting in COPD exacerbation.CTA  chest 11/29/2017 revealed   Large right hilar mass with involvement of adjacent segmental pulmonary arterial branches and bronchi with associated postobstructive atelectasis and volume loss in the RML  with adjacent ground glass opacity concerning for underlying neoplastic process. Mediastinal and axillary adenopathy, with a right axillary lymph node measuring up to 2.0 cm in short axis diameter.She underwent rt axillary LN bx at outside facility- on 1/16/2018 at [facility]. Pathology revealed metastatic poorly differentiated carcinoma of unknown primary site. She next underwent lung bx at [facility] 1/31/2018  benign lung tissue. Repeat Right Lung Bx 2/14/2018 Pathology reveals Squamous cell carcinoma PD-L1 10% low expression EGFR NEG ALK NEG BRAF NEG. Outside slides axillary LN specimen were reviewed/comparison to lung bx findings . She completed    cycle 6 of carboplatin/Abraxane completed 7/2018 .PET/CT  4/19/2018 revealed there has been a excellent response to therapy.  At least 90% reduction in malignant activity.PET/CT 2/21/2019 increased size and irregularity of the right axillary lymph node with increased FDG avidityMAMMO/US rt breast 2/2019 revealed suspicious findings rt axilla LN.  Right axilla, mass, core biopsy: Positive for poorly differentiated carcinoma breast primary ER neg/CA neg Her2 neg.[facility] reviewIt was determined  the lung tumor corresponds to a squamous cellcarcinoma and appears to be unrelated to the invasive ductal carcinoma of the breast. The axillary mass, in myopinion, corresponds to metastases of the breast primary. Although it is a poorly differentiated carcinoma, theimmunophenotype is most consistent with the one that the breast primary exhibited, and is inconsistent with metastatic squamous cell carcinoma. She was treated with  AC ( adriamycin 60m/m2/Cytoxan 600mg/m2)  q21d x 4 cycles completed 9/6/2019 . PET/CT 9/19/2019 shows disease response l decrease in  size and uptake of several right axillary lymph nodes and a supraclavicular lymph node.  Mild residual activity may indicate viable tumor.Pt followed by Rad/Onc. She was presented at [facility] tumor board and it was determined to proceed Radiation therapy only. No ALND due to concurrent lung and supraclavicular node. She  completed  RT 12/16/2019  To right axilla and right supraclavicular region PET/CT imaging  5/20/21 shows Continued increase in both size and hypermetabolic activity of right axillary level 1 lymph nodes a new, mildly hypermetabolic cutaneous thickening of the right breast. she underwent LYMPH NODE, RIGHT AXILLA, BIOPSY: 6/16/21 . Pathology showed Metastatic poorly-differentiated carcinoma, consistent with breast primary-ERneg/ PRneg  HER2  neg  Pt started on pembrolizumab 7/2021  Pt hospitalized 4/6/22 with hypokalemia and orthostatic hypotension  S/p C16 pembrolizumab 6/15/22  ( 400mg q6wks)      Interval  Hx: Recently hospitalized with  dizziness and possible TIA   CTA head was unremarkable with no evidence of proximal significant stenosis or large vessel occlusion. CTA neck showed atherosclerotic plaquing of the carotid bifurcations and proximal ICAs with less than 50% proximal ICA stenosis. CT head showed no evidence of acute intracranial hemorrhage or definite abnormal parenchymal enhancement. Patient was placed under observation for MRI brain and neurology evaluation.   Patients etiology appears to be secondary to small vessel disease. Recommendations were to continue Aspirin 81 mg daily, plavix 75 mg for 21 days followed by ASA 81 mg monotherapy thereafter. Patient also will be started on high intensity statin atorvastatin 40 mg daily   She reports no neuro deficits since hosp discharge and she is back to baseline   She continues with mild LOGAN-stable  She is f/b pulmonology, Dr. Katz  Arthralgias have improved with prescribed meds per PCP  She continues with chronic LBP , stable   No  "numbness/tingling in extremities  Appetite and weight stable  She has supp 02 at home  Advised by her treating pulmonologist to wear 02 at night               PrevHx:She had an abnormal mammogram 6/4/2014 whichRevealed a round solid mass 6mm in rt breast.She then underwent U/S guided core bx of rt breast mass on 6/17/2014.Pathology revealed infiltrating ductal carcinoma Grade 3 with tumorPresent in thin-walled spaces suggestive of lymphatic spaces. HormoneReceptor status on tumor specimen revealed ER negative 0% ,RI negative 0% and Her 2 jose maria negative.She subsequently underwent rt segmental mastectomy and SLN bxOn 7/11/2014. Pathology of rt breast lumpectomy revealed invasiveDuctal carcinoma with micropapillary pattern ( Invasive micropapillaryCa) with max tumor dimension 11.5mm with suggestion of tumor in Thin walled spaces c/w lymphovascular involvement. SurgicalMargins free of tumor, grade 3, ER/RI neg , Her 2 jose maria neg With [place] Lymph node negative for Neoplasm. Pathologic staging [id number](i-)Her mother was diagnosed with breast cancer in her 50's/        Review of Systems   Constitutional: Positive for mild  fatigue, stable No  activity change,  fever.   HENT:  Negative for mouth sores, rhinorrhea.  Eyes: Negative for visual disturbance.   Respiratory: Positive for chronic  LOGAN,stable, Negative for wheezing.    Cardiovascular: Negative for chest pain.   Gastrointestinal:  Negative for abdominal pain and diarrhea.   Genitourinary: Negative for frequency.   Musculoskeletal: Positive for chronic LBP, stable   Skin: Negative for rash.   Neurological: Negative for dizziness and headaches.   Hematological: Negative for adenopathy.   Psychiatric/Behavioral: The patient is not nervous/anxious.        Objective:        Vitals:    06/17/24 0955   BP: 108/72   Pulse: (!) 51   SpO2: 95%   Weight: 72.8 kg (160 lb 7.9 oz)   Height: 5' 3" (1.6 m)                 Physical Exam   Constitutional: She is oriented to person, " place, and time. She appears ill, coughing episodes  HENT:   Head: Normocephalic.   Eyes: Conjunctivae and lids are normal.No scleral icterus.   Neck: Normal range of motion. Neck supple. No thyromegaly present.   Cardiovascular: Normal rate, regular rhythm and normal heart sounds.   murmur heard.  Pulmonary/Chest: Breath sounds normal. She has no wheezes. She has no rales.   Abdominal: Soft. Bowel sounds are normal.  There is no tenderness. There is no rebound and no guarding.   Left breast- no masses or axillary LAD noted  Right breast- breast thickening inner quad    She has no cervical adenopathy.     She has rt axillary adenopathy.        Right: No supraclavicular adenopathy present.        Left: No supraclavicular adenopathy present.   Neurological: She is alert and oriented to person, place, and time. No cranial nerve deficit. Coordination normal.   Skin: Skin is warm and dry. No ecchymosis, no petechiae and no rash noted. No erythema.   Psychiatric: She has a normal mood and affect.             CT a/p w/contrast 11/29/2018   1. No acute abnormality identified within the abdomen and pelvis.    2.  There are a few nonspecific prominent lymph nodes in the upper abdomen including a periesophageal lymph node which measures 1.0 cm in short axis diameter.    3.  Significant abdominal aortic atherosclerosis and abdominal aorta ectasia.    4.  Additional findings as above.    PET/CT 1/26/2018   1.  Intense FDG uptake within the known right infrahilar lung mass, compatible with malignancy.  It is unclear if this represents primary lung malignancy or metastatic breast cancer.    2.  Intensely hypermetabolic right axillary, retropectoral, and lower right cervical lymph nodes, compatible with metastases.    3.  Abnormal FDG uptake along right breast skin thickening, new from prior chest CTA and mammogram.  This may relate to localized edema/inflammation, though correlation with physical exam and mammography are  recommended to exclude underlying inflammatory carcinoma.      MRI Brain w w/out contrast 2/9/2018  1.  No evidence of intracranial metastases.  2.  Sinus disease       Rt axillary LN bx at outside facility- on 1/16/2018 at [facility]  Pathology revealed metastatic poorly differentiated carcinoma of unknown primary  site 1/16/2018 at [facility]  TTF-1 negative, napsin negative  , cytokeratin 7 positive, cytokeratin 20 negative, p63 negative, cytokeratin 5/6 focal dim positivity    LUNG BIOPSY 1/31/2018  Pathology revealed benign lung tissue         SPECIMEN  1) Right Lung Bx 2/14/2018  Supplemental Diagnosis  Immunohistochemical stains show strong nuclear staining for p63 in essentially all tumor cells and very strong  cytoplasmic and membrane staining for CK5/6, also in essentially all tumor cells. TTF-1 and CK7 are negative  within tumor cells but do stain native pulmonary elements present within the biopsy. A stain for mucicarmine is  negative. Positive and negative controls function appropriately.  Final diagnosis: Specimen submitted as right lung biopsy  -Squamous cell carcinoma  (Electronically Signed: 2018-02-20 09:12:07 )  Diagnosed by: Phong Thompson  FINAL PATHOLOGIC DIAGNOSIS  Fragments of pulmonary parenchyma (submitted as right lung biopsy):    FINAL PATHOLOGIC DIAGNOSIS 1. Lymph node, right axilla, biopsy, review of 10 outside slides [facility], JS [phone],  collected January 11, 2018: Metastatic poorly differentiated carcinoma (see comment).  The histologic section shows fibrous stroma infiltrated by nest of poorly differentiated malignant cells including  occasional dyskeratotic cells morphologically suggestive of metastatic squamous cell carcinoma.  Immunohistochemical stains are nonspecific; the cells are positive for cytokeratin 7 and cytokeratin 5/6 (focal) and  negative for cytokeratin 20, TTF-1, p63, GCDFP, mammoglobin, and CEA        PET/CT  2/21/2019  Increased size and irregularity of the right axillary lymph node with increased FDG avidity.  Although this would be atypical in location for lung carcinoma, the increased size and avidity must be concerning for metastatic disease in this patient with history of squamous cell lung cancer.     US Rt breast and mammo 2/26/2019  Impression:  Right  Lymph Node: Right axilla lymph node. Assessment: 4 - Suspicious finding. Biopsy is recommended.      BI-RADS Category:   Right: 4 - Suspicious  Overall: 4 - Suspicious     Recommendation:  Biopsy is recommended. Biopsy of the lymph node measuring 1.4 x 1.1 x 1.3 recommended , the one with the round shape configuration, corresponding to the most recent PET CT finding.         Pathology 3/11/2019   FINAL PATHOLOGIC DIAGNOSIS  Right axilla, mass, core biopsy:  Positive for poorly differentiated carcinoma.  See comment.  Comment:  The biopsy from the right axilla mass shows a poorly differentiated carcinoma in background lymphoid tissue  with enlarged cells showing increased nuclear size, prominent nucleoli, moderate amounts of cytoplasm and mitotic  figures. Immunostains are completed and reveal the tumor cells to stain positively with cytokeratin AE 1/AE3, CK  5/6 and CK 7. The tumor cells are negative for CK 20, Estrogen receptor, p63 and TTF-1. All stains have  satisfactory positive and negative controls. The patient's prior cases will be reviewed and an additional stain for  JUAREZ-3 is pending in an attempt to pinpoint the primary site of this malignancy and results will follow in a  supplemental report.      Supplemental Diagnosis  3/11/2019  The current axillary mass is compared to the patient's prior right lung biopsy (case number [id number]) and the  current axillary mass is morphologically similar to the lung malignancy. Also, the current axillary mass is compared  to the patient's prior breast resection (Case number [id number]) and appears similar as well. P63  is negative in the  [id number] tumor and CK 5/6 shows scattered positive staining in the [id number] tumor.  Immunostain for JUAREZ-3 is completed and shows only rare weak to moderate staining within the tumor cell nuclei  with satisfactory positive and negative controls. This stain is positive in the surrounding lymphocytes. This staining  pattern is non-specific and does not definitively differentiate between a lung and breast primary malignancy in this  right axilla mass biopsy. The staining profile of the current axillary mass match more closely with the previous  breast carcinoma (due to the p63 negativity in both the 2014 breast cancer and the current axillary mass lesion but  p63 positivity in the lung mass from 2018).  This staining profile, together with the comparison with the patient's prior breast tumor and prior lung tumor supports  a diagnosis of poorly differentiated carcinoma and the malignancy appears morphologically similar to the prior  malignancies from both sites, but the staining profile in this small sample from the axillary mass more closely  correlates with the prior breast malignancy. Pathologic subclassification of this malignancy's primary location is not  definitive and clinical correlation is recommended definitively decide on possible primary location in this patient's  axillary mass sample.  Supplemental (2):  Additional immunohistochemical staining for progesterone receptor and HER2 are completed per clinician request  with following results:  Progesterone receptor: Negative; 0% nuclear tumor cell staining.  HER2: Negative; stain score = 0.  Supplemental (3):  [place] DIAGNOSIS:  FINAL DIAGNOSIS  Breast, right, needle core biopsy ([id number]; 06/17/2014): Invasive ductal carcinoma, Jaiden grade III (of  III), with focal micropapillary features.  Immunohistochemical stains performed at the referring institution show that the tumor cells have the following  phenotype: - Estrogen receptor:  Negative (0% tumor cells staining). - Progesterone receptor: Negative (0% tumor  cells staining). - HER2: Negative (score 0).  Lymph nodes, right, sentinel biopsy and lumpectomy ([id number]; 07/11/2014):  1. Right sentinel lymph node: A single (1) lymph node is negative for metastatic carcinoma.  Immunohistochemical stains performed by the referring institution and reviewed at [facility] for cytokeratin are  negative, confirming the diagnosis.  2. Right breast: Invasive ductal carcinoma with micropapillary features, per report 11.5 mm in greatest dimension.  Foci suspicious for lymphovascular invasion identified. Margins of resection are free of tumor.  Immunohistochemical stains performed by the referring institution and reviewed at [facility] show that the tumor  cells are negative for p63 and show patchy positivity for CK 5/6.  Lung, right, needle core biopsy ([id number]; 02/14/2018): Squamous cell carcinoma.    Immunohistochemical stains performed by the referring institution show the tumor cells are strongly positive for p63  and CK 5/6, while negative for TTF-1 and CK7. These result support the diagnosis. Axilla, right, needle core biopsy  ([id number]; 03/11/2019): Lymph node positive for metastatic carcinoma, most consistent with breast primary  (see comment).  Immunohistochemical stains performed by the referring institution and reviewed at [facility] show that the tumor  cells are negative for p63, focally positive for CK 5/6, focally and weakly positive for JUAREZ-3, and strongly positive  for CK7. They are also negative for estrogen receptor, progesterone receptor, and HER2 JAM (score 0).  COMMENT:  Thank you for allowing me to review the case of this 73-year-old lady who recently underwent needle core biopsies  of a right axillary mass and who has a previous diagnosis of an invasive ductal carcinoma of the right breast, as well  as a squamous cell carcinoma of the lung. I am reviewing this case because  of my special interest in breast  pathology.  I agree with your interpretation of this case. In my opinion, the lung tumor corresponds to a squamous cell  carcinoma and appears to be unrelated to the invasive ductal carcinoma of the breast. The axillary mass, in my  opinion, corresponds to metastases of the breast primary. Although it is a poorly differentiated carcinoma, the  immunophenotype is most consistent with the one that the breast primary exhibited, and is inconsistent with a  metastatic squamous cell carcinoma. I did share the case with Dr. Anabel Stone, one of our pulmonary pathologists,  and she concurs with this interpretation.  Note: Report attached.  Performing location:  [address] [address]      LYMPH NODE, RIGHT AXILLA, BIOPSY: 6/16/21   - Metastatic poorly-differentiated carcinoma, consistent with breast primary  -ER, NE, and HER2 immunohistochemical hormonal markers are also negative  PD-L1 (22C3) SemiQuant IHC, Manual  Interpretation  Right axillary lymph node , specimen for PD-L1 immunohistochemistry studies (clone 22C3, [facility]  [employer], [place], [place]; using a proprietary detection system) ([id number]):  Reported carcinoma site of origin: Breast  Result: The percent of PD-L1 positive cells based upon the total number of tumor cells (combined positive  score, CPS) is greater than or equal to 10.  Interpretation: Studies suggest that positive PD-L1 immunohistochemistry in tumor cells and/or tumorassociated  immune cells may predict tumor response to therapy with immune checkpoint inhibitors. This  result should not be used as the sole factor in determining treatment, as other factors (for example, tumor  mutation burden, and microsatellite instability) have been also studied as predictive markers.          CT neck/chest/abd/pelvis w/contrast 4/26/2019   The previously described right hilar mass is no longer visible.  There  is persistent volume loss in the right middle lobe this appears similar to the prior PET-CT February 21, 2019.    Persistent abnormal right axillary node today measuring about 15 mm compared 18 mm prior.  The positioning of the adjacent nodes is slightly different likely accounting for the slight difference in measurement.    Cluster of tree-in-bud nodular densities left lower lobe series 2, image 93.  This may be related to small airways disease though serial follow-up suggested.      PET/CT 6/20/2019   New and worsening hypermetabolic lymph nodes concerning for metastatic breast cancer as described above.  Tissue sampling would be required for definitive diagnosis.      2-D echo 6/27/2019   Normal left ventricular systolic function. The estimated ejection fraction is 55%  Concentric left ventricular remodeling.  Normal LV diastolic function.  Normal right ventricular systolic function.  Moderate left atrial enlargement.  Mild right atrial enlargement.  Mild aortic regurgitation.  Mild mitral regurgitation.  Mild tricuspid regurgitation.  Normal central venous pressure (3 mm Hg).  The estimated PA systolic pressure is 25 mm Hg      PET/CT 9/19/2019   Favorable response to therapy with interval decrease in size and uptake of several right axillary lymph nodes and a supraclavicular lymph node.  Mild residual activity may indicate viable tumor.    No new hypermetabolic findings worrisome for malignancy.    PET/CT 2/28/2020  1.  No evidence of hypermetabolic tumor.  Continued improvement of the right axilla status post adjuvant radiation.    2.  No new hypermetabolic lesions      CTA 6/17/2020  1. No evidence of pulmonary embolus. No aortic dissection.   2. There is no evidence for new or progressive disease in the chest as compared to PET/CT 6/20/2019 in this patient with history of right breast cancer and right lung neoplasm. The patient does have a more recent PET/CT 2/28/2020 from an outside facility per the  electronic medical record although images are not available for direct comparison. Correlation with these images may be beneficial. Continued long-term surveillance recommended.  3. Stable appearance of the treated tumor in the right hilum/middle lobe.  4. Stable treated right breast cancer and axillary adenopathy without evidence for developing breast mass or new right axillary adenopathy.  5. Stable tiny nodularity/tree-in-bud densities in the left lung, probably postinfectious or inflammatory.  6. Wall thickening of the esophagus may be correlated for esophagitis. Possible tiny hiatus hernia.  7. Slight bronchial wall thickening may be correlated for bronchitis.  8. Mild to moderate emphysema as before.  9. Reflux of contrast into the IVC and hepatic veins is nonspecific but may be correlated for elevated right heart pressures.  10. Coronary calcifications and/or stents similar to prior.    Echo 5/21/2020  Technically difficult study.   Normal left ventricular systolic function.   Left ventricular ejection fraction is estimated at 55%.   Severe mitral annular calcification.   Mild mitral valve regurgitation.   Aortic valve sclerosis without stenosis or regurgitation.     PFTs 6/17/2020  Spirometry reveals a very severe obstructive lung defect, which does not significantly improve post bronchodilators. Lung volumes revealed air trapping. Diffusing capacity was moderately impaired.        Del 6/26/2020  BI-RADS Category:   Overall: 2 - Benign      PET/CT 10/23/2020   Impression:     Stable mildly hypermetabolic right axillary lymph node/nodular density contralateral to the site of injection.  Differential considerations include metastatic lymph node, reactive lymph node from benign right upper extremity process, and fat necrosis.  Recommend physical examination of the upper extremity and consideration of ultrasound for further evaluation of the node/nodule and possible image guided biopsy.  Otherwise, attention on  follow-up.     Otherwise, no other hypermetabolic disease identified      Mammo diagnostic right /US 11/4/2020   Impression:   1. No suspicious finding either on mammogram or ultrasound at the site of the palpable lump in the right breast at 10:00 o'clock. A bilateral mammogram is recommended in June 2020 to return to the patient to annual screening.   2. Redemonstration of the morphologically abnormal lymph node in the right axilla that contains a biopsy clip, compatible with the known recurrence metastatic breast cancer that has been treated with chemotherapy. The appearance of this lymph node is unchanged from 06/26/2020 and overall appears smaller when compared to 03/11/2019.     Abd US 12/11/2020   Impression:     1. Echogenic liver likely representing hepatic steatosis.  2. Right upper quadrant ultrasound otherwise is unremarkable.     PET/CT 10/14/21     Impression:     1.  No definite evidence of hypermetabolic tumor.  Interval resolution of hypermetabolic right axillary lymph nodes.  No new hypermetabolic findings.     2.  Persistent low-grade diffuse cutaneous uptake which is nonspecific.    MRI shoulder w w/out contrast 1/26/22   Impression:     Mild edema and postcontrast enhancement at the myotendinous junction of the supraspinatus and infraspinatus, it is nonspecific and could be due to degenerative change or tendinosis.     Small area of interstitial tear at the footprint insertion of the infraspinatus tendon.     No large rotator cuff tear.     No advanced degenerative change.     No osseous lesions.     PET/CT 1/26/22  Impression:In this patient with breast cancer, there is no evidence of hypermetabolic tumor to suggest recurrent or metastatic disease.   Less extensive but, nonspecific, low-grade diffuse cutaneous uptake of the right breast.       PET/CT 4/27/22  Impression:     1.  No FDG avid disease to suggest recurrence or metastatic disease.     Unchanged right breast skin thickening with mild  FDG uptake.       PET/CT 7/13/22   Impression:     In this patient with lung and breast cancer, there is new left lower lobe opacification with mild radiotracer uptake which may represent aspiration, pneumonia, or malignancy.  Tissue sampling would be required for definitive diagnosis.     Unchanged right breast skin thickening with mild radiotracer uptake.    CT chest w/contrast 8/25/22    Decreasing patchy pulmonary consolidation within the left lower lobe when compared to prior PET-CT scan dated 07/13/2022.  The overall appearance of the patchy consolidation as well as the moderate improvement when compared to the prior study suggest a benign etiology such as left lower lobe pneumonia.     Persistent band of atelectasis/scarring within the right middle lobe, stable dating back to 04/26/2019.     Coronary artery atherosclerosis in a multi vessel distribution.     There are no measurable lesions per RECIST criteria.    PET /CT  11/21/22 shows New right lower lobe infiltrate worrisome for pneumonia or aspiration.Chronic infiltrate right middle lobe.   Resolution of the left lower lobe infiltrate.    Mammo 7/26/22  BI-RADS Category:   Overall: 2 - Benign        CT chest with contrast 11/21/22    Impression:     New right lower lobe infiltrate worrisome for pneumonia or aspiration.     Chronic infiltrate right middle lobe.     Resolution of the left lower lobe infiltrate.     Coronary artery calcifications.         PET/CT 1/11/23  Impression:     1. In this patient with lung and breast cancer, there is stable right breast skin thickening with mild radiotracer uptake.  2. Interval resolution of hypermetabolic opacification in the left lower lobe as compared to FDG PET-CT 07/14/2022.  Interval improvement in patchy opacities in the right lower lobe as compared to CT 11/21/2022    .                Electronically Signed By: Tapan Young MD 4/16/2023 23:17 CDT, [facility]  Narrative    PET/CT 4/14/23   [facility]  HISTORY:       Malignant neoplasm of female breast, unspecified estrogen receptor status, unspecified laterality, unspecified site of breast (CMS/HCC).       ICD10:  C50.919   Malignant neoplasm of female breast, unspecified estrogen receptor status, unspecified laterality, unspecified site of breast (CMS/HCC)       REFERENCE EXAMS:     7/13/2022 PET CT [facility] (no images, report only)     6/20/2019 PET CT ([facility])       TECHNIQUE:     PET/CT with attenuation correction.     Dose:  13.62 mCi of FDG-18     Injection site:  left wrist     Field of view:  Skull base to thighs.     Arm position:  above head     Baseline glucose:  128 mg/dL       FINDINGS:       All SUV values are max SUV.     Head/Neck:     Physiologic uptake of radiotracer.         Thorax:     Right portacath.       Cutaneous thickening in the right breast (SUV=1.58).       Calcification of the mitral valve, similar to 6/20/2019.       Coronary artery atherosclerotic calcification.     Atelectasis/scarring along the inferior aspect of the right major fissure, similar to 6/20/2019.         Abdomen/Pelvis:     Physiologic uptake in the genitourinary system.     Physiologic uptake in the intestines.       Patchy atherosclerotic calcification in the aorta and iliac arteries.       Impression      No opacity in the left lower lobe on the current exam to correspond to a left lower lobe opacity described on the 7/13/2022 PET CT report.  Images from the 7/13/2022 PET CT are not available at the time of this report.       Cutaneous thickening in the right breast with mild uptake.  There was a similar finding described on the 71/3/2022 PET CT report.           Electronically Signed By: Tapan Young MD 4/16/2023 23:17 CDT, [facility]          Mammo screening bilateral 8/7/23  BI-RADS Category: Overall: 2 - Benign     PET /CT ( NOT DOTATATE) 4/14/23 No opacity in the left lower lobe on the current exam to correspond to a  left lower lobe opacity described on the 7/13/2022 PET CT report.  Images from the 7/13/2022 PET CT are not available at the time of this report.   Cutaneous thickening in the right breast with mild uptake.  There was a similar finding described on the 71/3/2022 PET CT report.       PET/CT 11/14/23    Impression:     Similar appearing right breast skin thickening with mild hypermetabolic activity.  No new focal abnormal tracer uptake elsewhere.      EXAMINATION: 3/19/24   NM PET CT FDG SKULL BASE TO MID THIGH     CLINICAL HISTORY:  Breast cancer, invasive, stage IV, assess treatment response; Malignant neoplasm of unspecified site of unspecified female breast     TECHNIQUE:  12.8 mCi of F18-FDG was administered intravenously in the left antecubital fossa.  After an approximately 60 min distribution time, PET/CT images were acquired from the skull base to mid thigh.  Transmission images were acquired to correct for attenuation using a whole body low-dose CT scan without IV contrast with the arms positioned above the head. Glycemia at the time of injection was 116 mg/dL.     COMPARISON:  NM PET-CT 11/14/2023, 01/11/2023     FINDINGS:  Quality of the study: Adequate.     In the head and neck, there are no hypermetabolic lesions worrisome for malignancy. There are no hypermetabolic mucosal lesions, and there are no pathologically enlarged or hypermetabolic lymph nodes.  Prominent scattered physiologic muscular activity.     In the chest, there is similar appearing mild right breast skin thickening with decreased hypermetabolic activity now measuring 2.3 (image 103), previously SUV max 3.2.     There is a 3 mm left axillary lymph node with mild uptake, SUV max 2.6 (axial image 56), favored to represent reactive etiology.  Attention on follow-up.     There are no concerning pulmonary nodules or masses, and there are no pathologically enlarged or hypermetabolic lymph nodes.  Prominent physiologic tracer activity throughout  thoracic musculature.     In the abdomen and pelvis, there is physiologic tracer distribution within the abdominal organs and excretion into the genitourinary system.  Similar mildly hypermetabolic focus in the 1st portion of the duodenum without CT correlate measuring SUV max 5.5 (image 134), possibly physiologic although nonspecific.     In the bones, there are no hypermetabolic lesions worrisome for malignancy.  Prominent focus of hypermetabolic uptake in the left piriformis without underlying CT correlate (image 220), presumably physiologic.     Additional findings: Right chest wall implanted tunnel central venous catheter tip terminating in the superior vena cava.  Stable punctate pulmonary micronodule in the left upper lobe (axial series 3, image 73).  Coronary arterial and valvular calcific plaque.  Advanced aortoiliac calcific plaque.     Impression:     Similar appearing mild right breast skin thickening with decreased hypermetabolic activity. No new abnormal tracer uptake elsewhere worrisome for malignancy.     Additional findings as above.    CT c/a/p 5/3/24   Impression:     No acute abdominopelvic abnormality.  Specifically, no evidence for acute pancreatitis as clinically questioned.     Vague area of peripheral ground-glass with grouped micro-nodules at the peripheral right lung base with appearance suggesting sequela of infectious or non-infectious bronchiolitis.     Similar degree of asymmetric skin thickening at the right breast.  To correlate with mammographic and oncologic history.     Ectatic infrarenal abdominal aorta and additional findings as above.       Assessment:       1. Recurrent breast cancer, unspecified laterality    2. History of lung cancer    3. Chronic obstructive pulmonary disease, unspecified COPD type    4. Hypomagnesemia    5. History of TIA (transient ischemic attack)                    Plan:     1.,2.   Pt with hx of Stage 1A invasive ductal carcinoma rt breast s/p rt  segmental mastectomy and SLN bx 7/11/2014 ER/DE neg HER 2 jose maria neg [id number](i-).  S/p adjuvant RT completed 9/2014 Pt declined adjuvant chemo  Pt  hospitalized 11/2017 with acute-on-chronic  resp failure  Abnormal CT imaging revealing Large right hilar mass with involvement of  adjacent segmental pulmonary arterial branches and bronchi with associated postobstructive atelectasis  and involvement of mediastinal and axillary adenopathy   S/p rt axillary LN bx ( outside facility) - metastatic poorly differentiated carcinoma of unknown primary  site  status post  lung biopsy 1/31/2017 -benign lung tissue  PET/CT 1/26/2018   Intense FDG uptake within the known right infrahilar lung mass, compatible with malignancy.   Intensely hypermetabolic right axillary, retropectoral, and lower right cervical lymph nodes, compatible with metastases.  Abnormal FDG uptake along right breast skin thickening, new from prior chest CTA and mammogram.  PET/CT 11/14/23 Similar appearing right breast skin thickening with mild hypermetabolic activity.  No new focal abnormal tracer uptake elsewhere.  Repeat lung bx 2/14/2018 revealed Squamous Cell CA lung PD-L1 10% EGFR NEGALK NEG  Pt treated for advanced squamous cell CA of lung She s/p  cycle 6 of carboplatin/Abraxane Day1 completed 7/2/2018 ( day 15 held due to prolonged cytopenias)  She completed therapy with near CR     PET/CT 2/21/2019 showed increased size and irregularity of the right axillary lymph node with increased FDG avidity     MAMMO/US rt breast 3/2019  reveals suspicious findings rt axilla LN.  Biopsy of the lymph node measuring 1.4 x 1.1 x 1.3     Pt s/p  rt axillary LN bxPositive for poorly differentiated carcinoma.- likely breast primary ERneg PRneg Her 2 neg    Outside review of specimen(s) revealed  lung tumor corresponds to a squamous cell carcinoma and appears to be unrelated to the invasive ductal carcinoma of the breast. It was determined The axillary mass, in my  opinion, corresponded  to metastases of the breast primary. Although it is a poorly differentiated carcinoma, theimmunophenotype is most consistent with the one that the breast primary exhibited, and is inconsistent with a metastatic squamous cell carcinoma.     CT imaging 4/26/2019 persistent rt axillary LAD, no other sites of disease     Lymph node biopsy 3/11/2019 Right axilla, mass, core biopsy:Positive for poorly differentiated carcinoma.favor breast primary     PET/CT 6/20/2019  New and worsening hypermetabolic lymph nodes concerning for metastatic breast cancer     Previously Discussed  imaging findings in detail with patient which reveals new and worsening hypermetabolic melena metabolic lymph nodes including hypermetabolic supraclavicular node.  Therefore, at treatment option will be systemic chemotherapy in light of the recent imaging findings.  She will be followed by her surgical oncologist Dr. Christopher      IT was determined to proceed with systemic chemotherapy   S/p  AC [id number] x 3 wks x 4 wks completed 9/6/2019   ( pt has not received in past)      PET/CT 9/19/2019 shows disease response with interval decrease in size and uptake of several right axillary lymph nodes and a supraclavicular lymph node.  Mild residual activity may indicate viable tumor.No new hypermetabolic findings worrisome for malignancy.    Pt followed by  Breast Surgery and plan was  for possible   ALND. Pt with Recurrent cancer in her right axilla and right supraclavicular fossa.   She completed chemotherapy 9/6/2019 with near CR  It was determined  Radiation will be given to the original areas of hypermetabolic activity in the right axilla and right supraclavicular region    Pt completed  RT 12/16/2019     PET/CT 1/14/21 shows   New right axillary hypermetabolic lymph nodes with preserved normal architecture suggestive of reactive nodes.  Stable appearing previously identified hypermetabolic lymph node with mild increase in  surrounding fat stranding.        Findings previously d/w with treating breast surgeon and it was determined to cont to monitor and close f/u as pt is heavily pre treated, has received RT to site   Pt acknowledged understanding and agreeable with plan     PET/CT imaging  5/20/21 shows Continued increase in both size and hypermetabolic activity of right axillary level 1 lymph nodes a new, mildly hypermetabolic cutaneous thickening of the right breast.     Right breast, skin, punch biopsy:Negative for carcinoma    LYMPH NODE, RIGHT AXILLA, BIOPSY: 6/16/21   - Metastatic poorly-differentiated carcinoma, consistent with breast primary triple neg  PD-L1 immunohistochemistry studies(combined positive score, CPS) is greater than or equal to 10   PET/CT showed  No definite evidence of hypermetabolic tumor.  Interval resolution of hypermetabolic right axillary lymph nodes.  No new hypermetabolic findings.      S/p C16 pembrolizumab 6/15/22   Last  PET/CT imaging shows  new left lower lobe opacification with mild radiotracer uptake which may represent aspiration, pneumonia, or malignancy.  Recent follow-up imaging studies decreasing patchy pulmonary consolidation within the left lower lobe when compared to prior PET-CT scan dated 07/13/2022.  Plan to remain off of therapy   Follow-up PET/CT imaging 1/11/23 Interval resolution of hypermetabolic opacification in the left lower lobe as compared to FDG PET-CT 07/14/2022.  Interval improvement in patchy opacities in the right lower lobe as compared to CT 11/21/2022   follow up PET imaging [id number] -  performed at [facility] .([facility] was not available at  since broken )No opacity in the left lower lobe on the current exam to correspond to a left lower lobe opacity described on the 7/13/2022 PET CT report.  Images from the 7/13/2022 PET CT are not available at the time of this report.       Follow up PET imaging 3/19/24 shows Similar appearing mild right breast skin thickening with  decreased hypermetabolic activity. No new abnormal tracer uptake elsewhere worrisome for malignancy.  Recent CT imaging 5/2024 SHERRILL recurrence    Cont to monitor     3.  F/b Pulmonology  Pt on supp 02 at night   Cont MDI and O2 as prescribed     4. Rx for Mag supp  Check on f/u     5. Stable  On plavix.aspirin, lipitor 40 mg   Follow up with PCP and neurology for med mgmt

## 2024-06-19 ENCOUNTER — TELEPHONE (OUTPATIENT)
Dept: OTOLARYNGOLOGY | Facility: CLINIC | Age: 79
End: 2024-06-19
Payer: MEDICARE

## 2024-06-19 ENCOUNTER — TELEPHONE (OUTPATIENT)
Dept: HEMATOLOGY/ONCOLOGY | Facility: CLINIC | Age: 79
End: 2024-06-19
Payer: MEDICARE

## 2024-06-19 NOTE — TELEPHONE ENCOUNTER
Spoke with pt, states having dizziness off and on ,offered appt with NP,  pt states she will wait till oct to see Dr. Johnson, appt scheduled.

## 2024-06-19 NOTE — TELEPHONE ENCOUNTER
----- Message from Mani Cheung sent at 6/19/2024 12:30 PM CDT -----  Regarding: appt access  Contact: pt @ [phone]  Pt is wanting to be scheduled for appt at this location and there are no avail appts to me. Pt is complaining of frequent dizzy spells. Please call to further advise. Thank you for all you are doing.

## 2024-06-19 NOTE — TELEPHONE ENCOUNTER
[1:13 PM] Shakira Adams  Good afternoon I am Shakira the nurse from Hem/Onc  Dr Johnson has seen this patient in the past for us She has a history of Breast cancer & lung cancer but is having difficulty with her ear It feels like it is full o water constantly and she is extremely dizzy  She was recently hospitalized for the dizziness  among many things but the ear matter was not addressed  She stated she was told that she needed a tube in her ear but did not proceed with that at the time but would like Dr Johnson to revaluate her to discuss possible placement of the tube  Can you assist with an appointment for her  She is quite elderly and drives herself from a distance about 1& 1/2 hr  can you possibly accommodate her with a morning appointment . Do we have to place another referral since she is an established patient?  Thanks for you assistance   Shakira

## 2024-07-02 DIAGNOSIS — S39.012A LUMBAR STRAIN, INITIAL ENCOUNTER: ICD-10-CM

## 2024-07-02 RX ORDER — METHOCARBAMOL 500 MG/1
TABLET, FILM COATED ORAL
Qty: 20 TABLET | Refills: 0 | Status: SHIPPED | OUTPATIENT
Start: 2024-07-02

## 2024-07-02 NOTE — TELEPHONE ENCOUNTER
No care due was identified.  [facility] Embedded Care Due Messages. Reference number: [id number].   7/02/2024 11:10:04 AM CDT

## 2024-07-05 DIAGNOSIS — K21.00 GASTROESOPHAGEAL REFLUX DISEASE WITH ESOPHAGITIS, UNSPECIFIED WHETHER HEMORRHAGE: ICD-10-CM

## 2024-07-05 RX ORDER — PANTOPRAZOLE SODIUM 40 MG/1
40 TABLET, DELAYED RELEASE ORAL DAILY
Qty: 90 TABLET | Refills: 3 | Status: SHIPPED | OUTPATIENT
Start: 2024-07-05 | End: 2025-07-05

## 2024-07-11 ENCOUNTER — TELEPHONE (OUTPATIENT)
Dept: OTOLARYNGOLOGY | Facility: CLINIC | Age: 79
End: 2024-07-11
Payer: MEDICARE

## 2024-07-11 NOTE — TELEPHONE ENCOUNTER
Spoke with pt, she was trying to see if Dr. Mckee has a cancellation yet, she already has appt on 08/22/2024, informed her he only here 1/2 day before that and no availability right now, pt verb understanding

## 2024-07-15 ENCOUNTER — OFFICE VISIT (OUTPATIENT)
Dept: CARDIOLOGY | Facility: CLINIC | Age: 79
End: 2024-07-15
Payer: MEDICARE

## 2024-07-15 VITALS
HEART RATE: 60 BPM | SYSTOLIC BLOOD PRESSURE: 118 MMHG | OXYGEN SATURATION: 98 % | HEIGHT: 63 IN | DIASTOLIC BLOOD PRESSURE: 68 MMHG | WEIGHT: 160.63 LBS | BODY MASS INDEX: 28.46 KG/M2

## 2024-07-15 DIAGNOSIS — I70.0 ATHEROSCLEROSIS OF AORTA: ICD-10-CM

## 2024-07-15 DIAGNOSIS — R06.09 DOE (DYSPNEA ON EXERTION): ICD-10-CM

## 2024-07-15 DIAGNOSIS — I10 PRIMARY HYPERTENSION: Chronic | ICD-10-CM

## 2024-07-15 DIAGNOSIS — I25.118 CORONARY ARTERY DISEASE OF NATIVE ARTERY OF NATIVE HEART WITH STABLE ANGINA PECTORIS: Primary | Chronic | ICD-10-CM

## 2024-07-15 DIAGNOSIS — I50.32 CHRONIC HEART FAILURE WITH PRESERVED EJECTION FRACTION: ICD-10-CM

## 2024-07-15 PROCEDURE — 3288F FALL RISK ASSESSMENT DOCD: CPT | Mod: HCNC,CPTII,S$GLB, | Performed by: INTERNAL MEDICINE

## 2024-07-15 PROCEDURE — 1159F MED LIST DOCD IN RCRD: CPT | Mod: HCNC,CPTII,S$GLB, | Performed by: INTERNAL MEDICINE

## 2024-07-15 PROCEDURE — 99214 OFFICE O/P EST MOD 30 MIN: CPT | Mod: HCNC,S$GLB,, | Performed by: INTERNAL MEDICINE

## 2024-07-15 PROCEDURE — 1126F AMNT PAIN NOTED NONE PRSNT: CPT | Mod: HCNC,CPTII,S$GLB, | Performed by: INTERNAL MEDICINE

## 2024-07-15 PROCEDURE — 99999 PR PBB SHADOW E&M-EST. PATIENT-LVL III: CPT | Mod: PBBFAC,HCNC,, | Performed by: INTERNAL MEDICINE

## 2024-07-15 PROCEDURE — 3074F SYST BP LT 130 MM HG: CPT | Mod: HCNC,CPTII,S$GLB, | Performed by: INTERNAL MEDICINE

## 2024-07-15 PROCEDURE — 1157F ADVNC CARE PLAN IN RCRD: CPT | Mod: HCNC,CPTII,S$GLB, | Performed by: INTERNAL MEDICINE

## 2024-07-15 PROCEDURE — 1101F PT FALLS ASSESS-DOCD LE1/YR: CPT | Mod: HCNC,CPTII,S$GLB, | Performed by: INTERNAL MEDICINE

## 2024-07-15 PROCEDURE — 3078F DIAST BP <80 MM HG: CPT | Mod: HCNC,CPTII,S$GLB, | Performed by: INTERNAL MEDICINE

## 2024-07-15 RX ORDER — FLUTICASONE PROPIONATE 50 MCG
SPRAY, SUSPENSION (ML) NASAL
Qty: 48 G | Refills: 3 | Status: SHIPPED | OUTPATIENT
Start: 2024-07-15

## 2024-07-15 RX ORDER — ROSUVASTATIN CALCIUM 20 MG/1
20 TABLET, COATED ORAL DAILY
Qty: 90 TABLET | Refills: 3 | Status: SHIPPED | OUTPATIENT
Start: 2024-07-15 | End: 2025-07-15

## 2024-07-15 NOTE — TELEPHONE ENCOUNTER
Refill Decision Note   Ade Castellano  is requesting a refill authorization.  Brief Assessment and Rationale for Refill:  Approve     Medication Therapy Plan:         Comments:     Note composed:3:52 PM 07/15/2024

## 2024-07-15 NOTE — PROGRESS NOTES
Subjective   Patient ID:  Ade Castellano is a 78 y.o. female who presents for follow-up of Follow-up      HPI      CAD - KUN to RCA 2006, diastolic CHF, HTN, HLD, lung CA, breast CA     Previously followed by Dr Skelton  Patient with a past medical history of coronary artery disease.  Past medical history significant for lung cancer.  No significant coronary artery disease on coronary angiogram had luminal irregularities.     Heart failure with preserved ejection fraction     Squamous cell carcinoma of lung     Breast cancer     Aortic arch atherosclerosis     [facility] 8/13/20  Non-obstructive CAD.  No significant coronary artery stenosis. Luminal irregularities. Previously placed RCA stent is widely patent.     Stress test 7/24/20    The study shows abnormal myocardial perfusion.    Abnormal myocardial perfusion study.    Perfusion Defect There is a mild to moderate intensity, small to moderate sized, mostly reversible with some fixed areas defect in the anteroapical wall(s).    Gated perfusion images showed an ejection fraction of 71%    There is normal wall motion at rest and post stress.    The EKG portion of this study is negative for ischemia.    The patient reported no chest pain during the stress test.    There were no arrhythmias during stress.     Echo 5/22/24    No intracardiac source of embolus noted on this exam.  If high clinical suspicion, consider MORELIA +/- bubble study.    Left Ventricle: The left ventricle is normal in size. Normal wall thickness. There is normal systolic function with a visually estimated ejection fraction of 65 - 70%. Grade I diastolic dysfunction.    Right Ventricle: Normal right ventricular cavity size. Systolic function is normal.    Left Atrium: Left atrium is mildly dilated. Agitated saline study of the atrial septum is negative after vasalva maneuver, suggesting absence of intracardiac shunt at the atrial level.    Aortic Valve: The aortic valve is a trileaflet valve. There is  moderate aortic valve sclerosis. Mildly restricted motion. There is mild to moderate stenosis. Aortic valve area by VTI is 1.22 cm². Aortic valve peak velocity is 2.96 m/s. Mean gradient is 22 mmHg. The dimensionless index is 0.42.    Pulmonary Artery: The estimated pulmonary artery systolic pressure is 33 mmHg.        Holter 7/24/20  The diary was not returned.  NSR. Heart rates varied between 47 and 89 bpm with an average of 58 bpm.  Rare PACs / PVCs     EKG 4/25/23 Sinus bradycardia otherwise ok     6/21/23 Denies CP, Stable LOGAN  Stopped metoprolol recently for hypotension  BP controlled  Echo with EF 55% and moderate MR  Continue Rx for CAD, CHF, HTN, HLD  OV 3 months with BNP, BMP     9/21/23 labs done this morning. Denies CP, LOGAN improved  EKG sinus bradycardia 51 otherwise ok  BP controlled  Continue Rx for CAD, CHF, HTN, HLD  F/u labs if stable then OV 6 months    Admitted 5/21/24  Ade LOPEZ Gray with a pmh of CAD, COPD, history of MI (2006-stents), history of Bell's palsy with left facial droop (chronic) and cancer of cervix, lung, breast, and lymph nodes was placed under observation for further medical management of her dizziness and possible TIA workup. Patient with blurry vision, slurred speech, blurry vision, and feeling her body pulling to the right for the past few days. Patients CTA head was unremarkable with no evidence of proximal significant stenosis or large vessel occlusion. CTA neck showed atherosclerotic plaquing of the carotid bifurcations and proximal ICAs with less than 50% proximal ICA stenosis. CT head showed no evidence of acute intracranial hemorrhage or definite abnormal parenchymal enhancement. Patient was placed under observation for MRI brain and neurology evaluation. Patients labs were fairly unremarkable except lipid profile showing elevated triglycerides 178. Echocardiogram with bubble study showed normal systolic function with EF 65-70% with grade 1 dd. MRI brain without  contrast showed no acute intracranial abnormality. Neurology evaluation concluded that patients symptoms concerning for vascular etiology and mentions that at time posterior circulation infarcts can take up to 5 days to appear on MRI and therefore will treat accordingly. Patients etiology appears to be secondary to small vessel disease. Recommendations were to continue Aspirin 81 mg daily, plavix 75 mg for 21 days followed by ASA 81 mg monotherapy thereafter. Patient also will be started on high intensity statin atorvastatin 40 mg daily. Neurology discussed goal parameters for TIA/stroke with patient: LDL <70 mg/dl, HbA1C <7 %, and SBP <130/80 along with diet and exercise with lifestyle modifications. PT/OT evaluated patient and recommended low intensity therapy. Patients condition discussed at length with patient and explained precautions I.e to move slowly and avoid quick movements when getting up to prevent syncopal episodes. Patient understood and had no further questions.     7/15/24 Denies CP, mild stable LOGAN, denies further TIA symptoms  BP controlled    Review of Systems   Constitutional: Negative for decreased appetite.   HENT:  Negative for ear discharge.    Eyes:  Negative for blurred vision.   Respiratory:  Negative for hemoptysis.    Endocrine: Negative for polyphagia.   Hematologic/Lymphatic: Negative for adenopathy.   Skin:  Negative for color change.   Musculoskeletal:  Negative for joint swelling.   Genitourinary:  Negative for bladder incontinence.   Neurological:  Negative for brief paralysis.   Psychiatric/Behavioral:  Negative for hallucinations.    Allergic/Immunologic: Negative for hives.          Objective     Physical Exam  Constitutional:       Appearance: She is well-developed.   HENT:      Head: Normocephalic and atraumatic.   Eyes:      Conjunctiva/sclera: Conjunctivae normal.      Pupils: Pupils are equal, round, and reactive to light.   Cardiovascular:      Rate and Rhythm: Normal  rate.      Pulses: Intact distal pulses.      Heart sounds: Normal heart sounds.   Pulmonary:      Effort: Pulmonary effort is normal.      Breath sounds: Normal breath sounds.   Abdominal:      General: Bowel sounds are normal.      Palpations: Abdomen is soft.   Musculoskeletal:         General: Normal range of motion.      Cervical back: Normal range of motion and neck supple.   Skin:     General: Skin is warm and dry.   Neurological:      Mental Status: She is alert and oriented to person, place, and time.            Assessment and Plan     1. Coronary artery disease of native artery of native heart with stable angina pectoris    2. Chronic heart failure with preserved ejection fraction    3. Atherosclerosis of aorta    4. Primary hypertension    5. LOGAN (dyspnea on exertion)        Plan:    Change lipitor back to crestor at patient request   Continue Rx for CAD, CHF, HTN, HLD  OV 6 months    Advance Care Planning     Date: 07/15/2024  Patient did not wish or was not able to name a surrogate decision maker or provide an Advance Care Plan.

## 2024-07-15 NOTE — TELEPHONE ENCOUNTER
No care due was identified.  [facility] Embedded Care Due Messages. Reference number: [id number].   7/15/2024 11:17:00 AM CDT

## 2024-07-30 DIAGNOSIS — Z12.31 SCREENING MAMMOGRAM, ENCOUNTER FOR: Primary | ICD-10-CM

## 2024-08-08 ENCOUNTER — HOSPITAL ENCOUNTER (OUTPATIENT)
Dept: RADIOLOGY | Facility: HOSPITAL | Age: 79
Discharge: HOME | End: 2024-08-08
Attending: FAMILY MEDICINE
Payer: MEDICARE

## 2024-08-08 DIAGNOSIS — Z12.31 SCREENING MAMMOGRAM, ENCOUNTER FOR: ICD-10-CM

## 2024-08-08 PROCEDURE — 77063 BREAST TOMOSYNTHESIS BI: CPT | Mod: TC,HCNC

## 2024-08-08 PROCEDURE — 77067 SCR MAMMO BI INCL CAD: CPT | Mod: TC,HCNC

## 2024-08-12 RX ORDER — MONTELUKAST SODIUM 10 MG/1
10 TABLET ORAL
Qty: 30 TABLET | Refills: 1 | OUTPATIENT
Start: 2024-08-12

## 2024-08-12 NOTE — TELEPHONE ENCOUNTER
No care due was identified.  [facility] Embedded Care Due Messages. Reference number: [id number].   8/12/2024 8:25:57 AM CDT

## 2024-08-12 NOTE — TELEPHONE ENCOUNTER
Refill Decision Note   Ade Castellano  is requesting a refill authorization.  Brief Assessment and Rationale for Refill:  Quick Discontinue     Medication Therapy Plan: The original prescription was discontinued on 5/21/2024 by Luisa Julien for the following reason: Patient no longer taking.      Comments:     Note composed:12:54 PM 08/12/2024

## 2024-08-22 ENCOUNTER — OFFICE VISIT (OUTPATIENT)
Dept: OTOLARYNGOLOGY | Facility: CLINIC | Age: 79
End: 2024-08-22
Payer: MEDICARE

## 2024-08-22 ENCOUNTER — CLINICAL SUPPORT (OUTPATIENT)
Dept: AUDIOLOGY | Facility: CLINIC | Age: 79
End: 2024-08-22
Payer: MEDICARE

## 2024-08-22 VITALS
BODY MASS INDEX: 28.44 KG/M2 | DIASTOLIC BLOOD PRESSURE: 75 MMHG | HEIGHT: 63 IN | WEIGHT: 160.5 LBS | SYSTOLIC BLOOD PRESSURE: 115 MMHG

## 2024-08-22 DIAGNOSIS — H90.A32 MIXED CONDUCTIVE AND SENSORINEURAL HEARING LOSS OF LEFT EAR WITH RESTRICTED HEARING OF RIGHT EAR: Primary | ICD-10-CM

## 2024-08-22 DIAGNOSIS — H90.6 MIXED HEARING LOSS, BILATERAL: Primary | ICD-10-CM

## 2024-08-22 DIAGNOSIS — H93.8X1 EAR FULLNESS, RIGHT: ICD-10-CM

## 2024-08-22 PROCEDURE — 99213 OFFICE O/P EST LOW 20 MIN: CPT | Mod: S$GLB,,, | Performed by: OTOLARYNGOLOGY

## 2024-08-22 PROCEDURE — 1126F AMNT PAIN NOTED NONE PRSNT: CPT | Mod: CPTII,S$GLB,, | Performed by: OTOLARYNGOLOGY

## 2024-08-22 PROCEDURE — 3288F FALL RISK ASSESSMENT DOCD: CPT | Mod: CPTII,S$GLB,, | Performed by: OTOLARYNGOLOGY

## 2024-08-22 PROCEDURE — 1159F MED LIST DOCD IN RCRD: CPT | Mod: CPTII,S$GLB,, | Performed by: OTOLARYNGOLOGY

## 2024-08-22 PROCEDURE — 3078F DIAST BP <80 MM HG: CPT | Mod: CPTII,S$GLB,, | Performed by: OTOLARYNGOLOGY

## 2024-08-22 PROCEDURE — 3074F SYST BP LT 130 MM HG: CPT | Mod: CPTII,S$GLB,, | Performed by: OTOLARYNGOLOGY

## 2024-08-22 PROCEDURE — 1157F ADVNC CARE PLAN IN RCRD: CPT | Mod: CPTII,S$GLB,, | Performed by: OTOLARYNGOLOGY

## 2024-08-22 PROCEDURE — 1101F PT FALLS ASSESS-DOCD LE1/YR: CPT | Mod: CPTII,S$GLB,, | Performed by: OTOLARYNGOLOGY

## 2024-08-22 NOTE — PROGRESS NOTES
Please click on link to view Audiogram:  Document on 8/22/2024 9:30 AM by Sandrine Fried AU.D: Audiogram    Ms. Ade Castellano, a 78 y.o. female, was seen in the clinic today for an audiological evaluation. Ms. Castellano's main complaint was dizziness.    Tympanometry testing revealed Type B tympanograms with normal ear canal volume bilaterally.    Audiological testing revealed a mild to moderate sensorineural hearing loss (SNHL) for the right ear and a moderate to moderately-severe mixed hearing loss (MHL) for the left ear. A speech reception threshold was obtained at 35 dBHL for the right ear and at 50 dBHL for the left ear. Speech discrimination was 100% for the right ear and 96% for the left ear.      Recommendations:  1. Otologic evaluation  2. Annual audiological evaluation, sooner if medically necessary or if hearing changes  3. Hearing protection when in noise

## 2024-08-22 NOTE — PROGRESS NOTES
Subjective     Patient ID: Ade Castellano is a 78 y.o. female.    Chief Complaint: Follow-up and Dizziness    HPI     Ade Castellano is a 78 y.o. female presents for evaluation of intermittent ear fullness of her right ear.  She states this happens occasionally and lasts seconds.  Denies any associated hearing loss.  She was seen by me 2 years ago and noted to have middle ear effusions which she said got better.  She also reports dizziness that happens rarely when turning her head.  It is a non room spinning dizziness.  She describes it as a lightheaded feeling.    Review of Systems   Constitutional:  Negative for chills and fever.   HENT:  Negative for sore throat and trouble swallowing.    Respiratory:  Negative for apnea and chest tightness.    Cardiovascular:  Negative for chest pain.          Objective     Physical Exam  Vitals and nursing note reviewed.   Constitutional:       Appearance: Normal appearance.   HENT:      Head: Normocephalic and atraumatic.      Right Ear: Tympanic membrane, ear canal and external ear normal. There is no impacted cerumen.      Left Ear: Tympanic membrane, ear canal and external ear normal. There is impacted cerumen (Some ceruminosis noted this was removed with an alligator forceps).   Neurological:      Mental Status: She is alert.         Data Reviewed:      Audiogram reviewed by me shows moderate mixed hearing loss of left ear right ear with mild to moderate sensorineural hearing loss type B tympanograms bilaterally.     Assessment and Plan     1. Mixed hearing loss, bilateral    2. Ear fullness, right        Despite audiogram findings exam shows no sign of fluid.  Patient currently asymptomatic from her ears.  Do not recommend tube placement.         No follow-ups on file.

## 2024-08-26 PROBLEM — G45.9 TIA (TRANSIENT ISCHEMIC ATTACK): Status: RESOLVED | Noted: 2024-05-21 | Resolved: 2024-08-26

## 2024-09-07 NOTE — TELEPHONE ENCOUNTER
No care due was identified.  [facility] Embedded Care Due Messages. Reference number: [id number].   9/07/2024 9:45:10 AM CDT

## 2024-09-08 RX ORDER — MONTELUKAST SODIUM 10 MG/1
10 TABLET ORAL
Qty: 30 TABLET | Refills: 1 | OUTPATIENT
Start: 2024-09-08

## 2024-09-08 NOTE — TELEPHONE ENCOUNTER
Refill Decision Note   Ade Castellano  is requesting a refill authorization.  Brief Assessment and Rationale for Refill:  Quick Discontinue     Medication Therapy Plan:  The original prescription was discontinued on 5/21/2024 by Luisa Julien for the following reason: Patient no longer taking.      Comments:     Note composed:1:45 AM 09/08/2024

## 2024-09-10 RX ORDER — MONTELUKAST SODIUM 10 MG/1
10 TABLET ORAL
Qty: 30 TABLET | Refills: 1 | OUTPATIENT
Start: 2024-09-10

## 2024-09-10 NOTE — TELEPHONE ENCOUNTER
Refill Decision Note   Ade Castellano  is requesting a refill authorization.  Brief Assessment and Rationale for Refill:  Quick Discontinue     Medication Therapy Plan:  discontinued on 5/21/2024 by Luisa Julien for the following reason: Patient no longer taking.      Comments:     Note composed:1:47 PM 09/10/2024

## 2024-09-10 NOTE — TELEPHONE ENCOUNTER
No care due was identified.  [facility] Embedded Care Due Messages. Reference number: [id number].   9/10/2024 8:59:59 AM CDT

## 2024-09-17 ENCOUNTER — INFUSION (OUTPATIENT)
Dept: INFUSION THERAPY | Facility: HOSPITAL | Age: 79
End: 2024-09-17
Attending: INTERNAL MEDICINE
Payer: MEDICARE

## 2024-09-17 ENCOUNTER — OFFICE VISIT (OUTPATIENT)
Dept: HEMATOLOGY/ONCOLOGY | Facility: CLINIC | Age: 79
End: 2024-09-17
Payer: MEDICARE

## 2024-09-17 VITALS
BODY MASS INDEX: 26.95 KG/M2 | DIASTOLIC BLOOD PRESSURE: 76 MMHG | HEART RATE: 56 BPM | WEIGHT: 152.13 LBS | OXYGEN SATURATION: 98 % | HEIGHT: 63 IN | SYSTOLIC BLOOD PRESSURE: 112 MMHG

## 2024-09-17 VITALS
TEMPERATURE: 98 F | DIASTOLIC BLOOD PRESSURE: 74 MMHG | SYSTOLIC BLOOD PRESSURE: 162 MMHG | OXYGEN SATURATION: 97 % | HEART RATE: 55 BPM | RESPIRATION RATE: 16 BRPM

## 2024-09-17 DIAGNOSIS — Z85.118 HISTORY OF LUNG CANCER: ICD-10-CM

## 2024-09-17 DIAGNOSIS — Z86.73 HISTORY OF TIA (TRANSIENT ISCHEMIC ATTACK): ICD-10-CM

## 2024-09-17 DIAGNOSIS — J44.9 CHRONIC OBSTRUCTIVE PULMONARY DISEASE, UNSPECIFIED COPD TYPE: ICD-10-CM

## 2024-09-17 DIAGNOSIS — C34.90 SQUAMOUS CELL CARCINOMA LUNG: Primary | ICD-10-CM

## 2024-09-17 DIAGNOSIS — C50.919 RECURRENT BREAST CANCER, UNSPECIFIED LATERALITY: Primary | ICD-10-CM

## 2024-09-17 LAB
ALBUMIN SERPL BCP-MCNC: 3.7 G/DL (ref 3.5–5.2)
ALP SERPL-CCNC: 47 U/L (ref 55–135)
ALT SERPL W/O P-5'-P-CCNC: 9 U/L (ref 10–44)
ANION GAP SERPL CALC-SCNC: 8 MMOL/L (ref 8–16)
AST SERPL-CCNC: 14 U/L (ref 10–40)
BASOPHILS # BLD AUTO: 0.05 K/UL (ref 0–0.2)
BASOPHILS NFR BLD: 1 % (ref 0–1.9)
BILIRUB SERPL-MCNC: 0.5 MG/DL (ref 0.1–1)
BUN SERPL-MCNC: 14 MG/DL (ref 8–23)
CALCIUM SERPL-MCNC: 9.1 MG/DL (ref 8.7–10.5)
CHLORIDE SERPL-SCNC: 106 MMOL/L (ref 95–110)
CO2 SERPL-SCNC: 24 MMOL/L (ref 23–29)
CREAT SERPL-MCNC: 0.8 MG/DL (ref 0.5–1.4)
DIFFERENTIAL METHOD BLD: ABNORMAL
EOSINOPHIL # BLD AUTO: 0.1 K/UL (ref 0–0.5)
EOSINOPHIL NFR BLD: 2.3 % (ref 0–8)
ERYTHROCYTE [DISTWIDTH] IN BLOOD BY AUTOMATED COUNT: 13.2 % (ref 11.5–14.5)
EST. GFR  (NO RACE VARIABLE): >60 ML/MIN/1.73 M^2
GLUCOSE SERPL-MCNC: 117 MG/DL (ref 70–110)
HCT VFR BLD AUTO: 39.8 % (ref 37–48.5)
HGB BLD-MCNC: 12.8 G/DL (ref 12–16)
IMM GRANULOCYTES # BLD AUTO: 0.04 K/UL (ref 0–0.04)
IMM GRANULOCYTES NFR BLD AUTO: 0.8 % (ref 0–0.5)
LYMPHOCYTES # BLD AUTO: 1.2 K/UL (ref 1–4.8)
LYMPHOCYTES NFR BLD: 22.2 % (ref 18–48)
MAGNESIUM SERPL-MCNC: 1.7 MG/DL (ref 1.6–2.6)
MCH RBC QN AUTO: 29.6 PG (ref 27–31)
MCHC RBC AUTO-ENTMCNC: 32.2 G/DL (ref 32–36)
MCV RBC AUTO: 92 FL (ref 82–98)
MONOCYTES # BLD AUTO: 0.5 K/UL (ref 0.3–1)
MONOCYTES NFR BLD: 9.3 % (ref 4–15)
NEUTROPHILS # BLD AUTO: 3.4 K/UL (ref 1.8–7.7)
NEUTROPHILS NFR BLD: 64.4 % (ref 38–73)
NRBC BLD-RTO: 0 /100 WBC
PLATELET # BLD AUTO: 162 K/UL (ref 150–450)
PMV BLD AUTO: 11.5 FL (ref 9.2–12.9)
POTASSIUM SERPL-SCNC: 3.6 MMOL/L (ref 3.5–5.1)
PROT SERPL-MCNC: 6.5 G/DL (ref 6–8.4)
RBC # BLD AUTO: 4.32 M/UL (ref 4–5.4)
SODIUM SERPL-SCNC: 138 MMOL/L (ref 136–145)
WBC # BLD AUTO: 5.26 K/UL (ref 3.9–12.7)

## 2024-09-17 PROCEDURE — 36591 DRAW BLOOD OFF VENOUS DEVICE: CPT | Mod: HCNC

## 2024-09-17 PROCEDURE — 80053 COMPREHEN METABOLIC PANEL: CPT | Mod: HCNC | Performed by: INTERNAL MEDICINE

## 2024-09-17 PROCEDURE — 99999 PR PBB SHADOW E&M-EST. PATIENT-LVL IV: CPT | Mod: PBBFAC,HCNC,, | Performed by: INTERNAL MEDICINE

## 2024-09-17 PROCEDURE — 63600175 PHARM REV CODE 636 W HCPCS: Mod: HCNC | Performed by: INTERNAL MEDICINE

## 2024-09-17 PROCEDURE — 83735 ASSAY OF MAGNESIUM: CPT | Mod: HCNC | Performed by: INTERNAL MEDICINE

## 2024-09-17 PROCEDURE — 85025 COMPLETE CBC W/AUTO DIFF WBC: CPT | Mod: HCNC | Performed by: INTERNAL MEDICINE

## 2024-09-17 RX ORDER — SODIUM CHLORIDE 0.9 % (FLUSH) 0.9 %
10 SYRINGE (ML) INJECTION
Status: DISCONTINUED | OUTPATIENT
Start: 2024-09-17 | End: 2024-09-17 | Stop reason: HOSPADM

## 2024-09-17 RX ORDER — HEPARIN 100 UNIT/ML
500 SYRINGE INTRAVENOUS
Status: DISCONTINUED | OUTPATIENT
Start: 2024-09-17 | End: 2024-09-17 | Stop reason: HOSPADM

## 2024-09-17 RX ADMIN — HEPARIN 500 UNITS: 100 SYRINGE at 08:09

## 2024-09-17 NOTE — PROGRESS NOTES
Subjective:       Patient ID: Ade Castellano is a 78 y.o. female.    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 The PET(+) disease in the right axilla and right supraclavicular fossa will be boosted at 200 cGy per fraction to 6000 cGy total dose.  S/p LYMPH NODE, RIGHT AXILLA, BIOPSY: 6/16/21 . Metastatic poorly-differentiated carcinoma, c/w breast primary ERnegPRneg Her2 neg  PD-L1 (22C3) IHC CPS> is greater than or equal to 10  Pembrolizumab 7/22/21 -6/15/22         Prior Hx:  79 y/o female with history of  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  And Rt  breast Haja/p  cycle 6 of carboplatin/Abraxane 7/2/2018   She has  History of Stage 1A  Rt breast cancer Grade 3, ER/CA neg , Her 2 jose maria neg s/p lumpectomy 7/11/2014 and s/p adjuvant RT 9/2014 Pt declined Adjuvant chemo.Pathology showed a 1.15 cm, grade 3 infiltrating ductal carcinoma.  One sentinel lymph node was negative for malignancy.  Margins were negative and the closest margin was 1 cm.  She was staged  pT1c  pN0 cM0 stage IA.  She declined adjuvant chemo therapy.  She completed post operative radiation therapy at 400 cGy per fraction to 4000 cGy 9/2014  Pt hospitalized 11/2017 for  acute on chronic respiratory failure requiring intubation and ventilation. Has large lung mass in right lung with probable post obstructive pneumonia resulting in COPD  exacerbation.CTA chest 11/29/2017 revealed   Large right hilar mass with involvement of adjacent segmental pulmonary arterial branches and bronchi with associated postobstructive atelectasis and volume loss in the RML  with adjacent ground glass opacity concerning for underlying neoplastic process. Mediastinal and axillary adenopathy, with a right axillary lymph node measuring up to 2.0 cm in short axis diameter.She underwent rt axillary LN bx at outside facility- on 1/16/2018 at [facility]. Pathology revealed metastatic poorly differentiated carcinoma of unknown primary site. She next underwent lung bx at [facility] 1/31/2018  benign lung tissue. Repeat Right Lung Bx 2/14/2018 Pathology reveals Squamous cell carcinoma PD-L1 10% low expression EGFR NEG ALK NEG BRAF NEG. Outside slides axillary LN specimen were reviewed/comparison to lung bx findings . She completed    cycle 6 of carboplatin/Abraxane completed 7/2018 .PET/CT  4/19/2018 revealed there has been a excellent response to therapy.  At least 90% reduction in malignant activity.PET/CT 2/21/2019 increased size and irregularity of the right axillary lymph node with increased FDG avidityMAMMO/US rt breast 2/2019 revealed suspicious findings rt axilla LN.  Right axilla, mass, core biopsy: Positive for poorly differentiated carcinoma breast primary ER neg/SD neg Her2 neg.[facility] for outside reviewIt was determined  the lung tumor corresponds to a squamous cellcarcinoma and appears to be unrelated to the invasive ductal carcinoma of the breast. The axillary mass, in myopinion, corresponds to metastases of the breast primary. Although it is a poorly differentiated carcinoma, theimmunophenotype is most consistent with the one that the breast primary exhibited, and is inconsistent with metastatic squamous cell carcinoma. She was treated with  AC ( adriamycin 60m/m2/Cytoxan 600mg/m2)  q21d x 4 cycles completed 9/6/2019 . PET/CT 9/19/2019 shows disease response  l decrease in size and uptake of several right axillary lymph nodes and a supraclavicular lymph node.  Mild residual activity may indicate viable tumor.Pt followed by Rad/Onc. She was presented at [facility] tumor board and it was determined to proceed Radiation therapy only. No ALND due to concurrent lung and supraclavicular node. She  completed  RT 12/16/2019  To right axilla and right supraclavicular region PET/CT imaging  5/20/21 shows Continued increase in both size and hypermetabolic activity of right axillary level 1 lymph nodes a new, mildly hypermetabolic cutaneous thickening of the right breast. she underwent LYMPH NODE, RIGHT AXILLA, BIOPSY: 6/16/21 . Pathology showed Metastatic poorly-differentiated carcinoma, consistent with breast primary-ERneg/ PRneg  HER2  neg  Pt started on pembrolizumab 7/2021  Pt hospitalized 4/6/22 with hypokalemia and orthostatic hypotension  S/p C16 pembrolizumab 6/15/22  ( 400mg q6wks)  hospitalized with  dizziness and possible TIA   Patients etiology appears to be secondary to small vessel disease. Recommendations were to continue Aspirin 81 mg daily, plavix 75 mg for 21 days followed by ASA 81 mg monotherapy thereafter.     Interval Hx: She reports no neuro deficits since hosp discharge and she is back to baseline   She continues with mild LOGAN-stable  She is f/b pulmonology, Dr. Katz  Arthralgias have improved with prescribed meds per PCP  She continues with chronic LBP , stable  Continues with rt shoulder arthralgias   No numbness/tingling in extremities  Appetite and weight stable  She has supp 02 at home  Cont to wear 02 at night               PrevHx:She had an abnormal mammogram 6/4/2014 whichRevealed a round solid mass 6mm in rt breast.She then underwent U/S guided core bx of rt breast mass on 6/17/2014.Pathology revealed infiltrating ductal carcinoma Grade 3 with tumorPresent in thin-walled spaces suggestive of lymphatic spaces. HormoneReceptor status on tumor  "specimen revealed ER negative 0% ,KS negative 0% and Her 2 jose maria negative.She subsequently underwent rt segmental mastectomy and SLN bxOn 7/11/2014. Pathology of rt breast lumpectomy revealed invasiveDuctal carcinoma with micropapillary pattern ( Invasive micropapillaryCa) with max tumor dimension 11.5mm with suggestion of tumor in Thin walled spaces c/w lymphovascular involvement. SurgicalMargins free of tumor, grade 3, ER/KS neg , Her 2 jose maria neg With [place] Lymph node negative for Neoplasm. Pathologic staging [id number](i-)Her mother was diagnosed with breast cancer in her 50's/        Review of Systems   Constitutional: Positive for mild  fatigue, stable No  activity change,  fever.   HENT:  Negative for mouth sores, rhinorrhea.  Eyes: Negative for visual disturbance.   Respiratory: Positive for chronic  LOGAN,stable, Negative for wheezing.    Cardiovascular: Negative for chest pain.   Gastrointestinal:  Negative for abdominal pain and diarrhea.   Genitourinary: Negative for frequency.   Musculoskeletal: Positive for chronic LBP, stable and rt shoulder arthralgia  Skin: Negative for rash.   Neurological: Negative for dizziness and headaches.   Hematological: Negative for adenopathy.   Psychiatric/Behavioral: The patient is not nervous/anxious.        Objective:        Vitals:    09/17/24 1013   BP: 112/76   BP Location: Right arm   Patient Position: Sitting   Pulse: (!) 56   SpO2: 98%   Weight: 69 kg (152 lb 1.9 oz)   Height: 5' 3" (1.6 m)       Physical Exam   Constitutional: She is oriented to person, place, and time. She appears ill, coughing episodes  HENT:   Head: Normocephalic.   Eyes: Conjunctivae and lids are normal.No scleral icterus.   Neck: Normal range of motion. Neck supple. No thyromegaly present.   Cardiovascular: Normal rate, regular rhythm and normal heart sounds.   murmur heard.  Pulmonary/Chest: Breath sounds normal. She has no wheezes. She has no rales.   Abdominal: Soft. Bowel sounds are normal.  " There is no tenderness. There is no rebound and no guarding.   Left breast- no masses or axillary LAD noted  Right breast- breast thickening inner quad    She has no cervical adenopathy.     She has rt axillary adenopathy.        Right: No supraclavicular adenopathy present.        Left: No supraclavicular adenopathy present.   Neurological: She is alert and oriented to person, place, and time. No cranial nerve deficit. Coordination normal.   Skin: Skin is warm and dry. No ecchymosis, no petechiae and no rash noted. No erythema.   Psychiatric: She has a normal mood and affect.             CT a/p w/contrast 11/29/2018   1. No acute abnormality identified within the abdomen and pelvis.    2.  There are a few nonspecific prominent lymph nodes in the upper abdomen including a periesophageal lymph node which measures 1.0 cm in short axis diameter.    3.  Significant abdominal aortic atherosclerosis and abdominal aorta ectasia.    4.  Additional findings as above.    PET/CT 1/26/2018   1.  Intense FDG uptake within the known right infrahilar lung mass, compatible with malignancy.  It is unclear if this represents primary lung malignancy or metastatic breast cancer.    2.  Intensely hypermetabolic right axillary, retropectoral, and lower right cervical lymph nodes, compatible with metastases.    3.  Abnormal FDG uptake along right breast skin thickening, new from prior chest CTA and mammogram.  This may relate to localized edema/inflammation, though correlation with physical exam and mammography are recommended to exclude underlying inflammatory carcinoma.      MRI Brain w w/out contrast 2/9/2018  1.  No evidence of intracranial metastases.  2.  Sinus disease       Rt axillary LN bx at outside facility- on 1/16/2018 at [facility]  Pathology revealed metastatic poorly differentiated carcinoma of unknown primary  site 1/16/2018 at [facility]  TTF-1 negative, napsin negative  , cytokeratin 7  positive, cytokeratin 20 negative, p63 negative, cytokeratin 5/6 focal dim positivity    LUNG BIOPSY 1/31/2018  Pathology revealed benign lung tissue         SPECIMEN  1) Right Lung Bx 2/14/2018  Supplemental Diagnosis  Immunohistochemical stains show strong nuclear staining for p63 in essentially all tumor cells and very strong  cytoplasmic and membrane staining for CK5/6, also in essentially all tumor cells. TTF-1 and CK7 are negative  within tumor cells but do stain native pulmonary elements present within the biopsy. A stain for mucicarmine is  negative. Positive and negative controls function appropriately.  Final diagnosis: Specimen submitted as right lung biopsy  -Squamous cell carcinoma  (Electronically Signed: 2018-02-20 09:12:07 )  Diagnosed by: Phong Thompsno  FINAL PATHOLOGIC DIAGNOSIS  Fragments of pulmonary parenchyma (submitted as right lung biopsy):    FINAL PATHOLOGIC DIAGNOSIS 1. Lymph node, right axilla, biopsy, review of 10 outside slides [facility], JS [phone],  collected January 11, 2018: Metastatic poorly differentiated carcinoma (see comment).  The histologic section shows fibrous stroma infiltrated by nest of poorly differentiated malignant cells including  occasional dyskeratotic cells morphologically suggestive of metastatic squamous cell carcinoma.  Immunohistochemical stains are nonspecific; the cells are positive for cytokeratin 7 and cytokeratin 5/6 (focal) and  negative for cytokeratin 20, TTF-1, p63, GCDFP, mammoglobin, and CEA        PET/CT 2/21/2019  Increased size and irregularity of the right axillary lymph node with increased FDG avidity.  Although this would be atypical in location for lung carcinoma, the increased size and avidity must be concerning for metastatic disease in this patient with history of squamous cell lung cancer.     US Rt breast and mammo 2/26/2019  Impression:  Right  Lymph Node: Right axilla lymph node. Assessment: 4 - Suspicious finding.  Biopsy is recommended.      BI-RADS Category:   Right: 4 - Suspicious  Overall: 4 - Suspicious     Recommendation:  Biopsy is recommended. Biopsy of the lymph node measuring 1.4 x 1.1 x 1.3 recommended , the one with the round shape configuration, corresponding to the most recent PET CT finding.         Pathology 3/11/2019   FINAL PATHOLOGIC DIAGNOSIS  Right axilla, mass, core biopsy:  Positive for poorly differentiated carcinoma.  See comment.  Comment:  The biopsy from the right axilla mass shows a poorly differentiated carcinoma in background lymphoid tissue  with enlarged cells showing increased nuclear size, prominent nucleoli, moderate amounts of cytoplasm and mitotic  figures. Immunostains are completed and reveal the tumor cells to stain positively with cytokeratin AE 1/AE3, CK  5/6 and CK 7. The tumor cells are negative for CK 20, Estrogen receptor, p63 and TTF-1. All stains have  satisfactory positive and negative controls. The patient's prior cases will be reviewed and an additional stain for  JUAREZ-3 is pending in an attempt to pinpoint the primary site of this malignancy and results will follow in a  supplemental report.      Supplemental Diagnosis  3/11/2019  The current axillary mass is compared to the patient's prior right lung biopsy (case number [id number]) and the  current axillary mass is morphologically similar to the lung malignancy. Also, the current axillary mass is compared  to the patient's prior breast resection (Case number [id number]) and appears similar as well. P63 is negative in the  [id number] tumor and CK 5/6 shows scattered positive staining in the [id number] tumor.  Immunostain for JUAREZ-3 is completed and shows only rare weak to moderate staining within the tumor cell nuclei  with satisfactory positive and negative controls. This stain is positive in the surrounding lymphocytes. This staining  pattern is non-specific and does not definitively differentiate between a lung and breast  primary malignancy in this  right axilla mass biopsy. The staining profile of the current axillary mass match more closely with the previous  breast carcinoma (due to the p63 negativity in both the 2014 breast cancer and the current axillary mass lesion but  p63 positivity in the lung mass from 2018).  This staining profile, together with the comparison with the patient's prior breast tumor and prior lung tumor supports  a diagnosis of poorly differentiated carcinoma and the malignancy appears morphologically similar to the prior  malignancies from both sites, but the staining profile in this small sample from the axillary mass more closely  correlates with the prior breast malignancy. Pathologic subclassification of this malignancy's primary location is not  definitive and clinical correlation is recommended definitively decide on possible primary location in this patient's  axillary mass sample.  Supplemental (2):  Additional immunohistochemical staining for progesterone receptor and HER2 are completed per clinician request  with following results:  Progesterone receptor: Negative; 0% nuclear tumor cell staining.  HER2: Negative; stain score = 0.  Supplemental (3):  [place] DIAGNOSIS:  FINAL DIAGNOSIS  Breast, right, needle core biopsy ([id number]; 06/17/2014): Invasive ductal carcinoma, [place] grade III (of  III), with focal micropapillary features.  Immunohistochemical stains performed at the referring institution show that the tumor cells have the following  phenotype: - Estrogen receptor: Negative (0% tumor cells staining). - Progesterone receptor: Negative (0% tumor  cells staining). - HER2: Negative (score 0).  Lymph nodes, right, sentinel biopsy and lumpectomy ([id number]; 07/11/2014):  1. Right sentinel lymph node: A single (1) lymph node is negative for metastatic carcinoma.  Immunohistochemical stains performed by the referring institution and reviewed at [facility] for cytokeratin are  negative,  confirming the diagnosis.  2. Right breast: Invasive ductal carcinoma with micropapillary features, per report 11.5 mm in greatest dimension.  Foci suspicious for lymphovascular invasion identified. Margins of resection are free of tumor.  Immunohistochemical stains performed by the referring institution and reviewed at [facility] show that the tumor  cells are negative for p63 and show patchy positivity for CK 5/6.  Lung, right, needle core biopsy ([id number]; 02/14/2018): Squamous cell carcinoma.    Immunohistochemical stains performed by the referring institution show the tumor cells are strongly positive for p63  and CK 5/6, while negative for TTF-1 and CK7. These result support the diagnosis. Axilla, right, needle core biopsy  ([id number]; 03/11/2019): Lymph node positive for metastatic carcinoma, most consistent with breast primary  (see comment).  Immunohistochemical stains performed by the referring institution and reviewed at [facility] show that the tumor  cells are negative for p63, focally positive for CK 5/6, focally and weakly positive for JUAREZ-3, and strongly positive  for CK7. They are also negative for estrogen receptor, progesterone receptor, and HER2 JAM (score 0).  COMMENT:  Thank you for allowing me to review the case of this 73-year-old lady who recently underwent needle core biopsies  of a right axillary mass and who has a previous diagnosis of an invasive ductal carcinoma of the right breast, as well  as a squamous cell carcinoma of the lung. I am reviewing this case because of my special interest in breast  pathology.  I agree with your interpretation of this case. In my opinion, the lung tumor corresponds to a squamous cell  carcinoma and appears to be unrelated to the invasive ductal carcinoma of the breast. The axillary mass, in my  opinion, corresponds to metastases of the breast primary. Although it is a poorly differentiated carcinoma, the  immunophenotype is most consistent with the  one that the breast primary exhibited, and is inconsistent with a  metastatic squamous cell carcinoma. I did share the case with Dr. Anabel Stone, one of our pulmonary pathologists,  and she concurs with this interpretation.  Note: Report attached.  Performing location:  [facility]  [address] [place] [address]      LYMPH NODE, RIGHT AXILLA, BIOPSY: 6/16/21   - Metastatic poorly-differentiated carcinoma, consistent with breast primary  -ER, AR, and HER2 immunohistochemical hormonal markers are also negative  PD-L1 (22C3) SemiQuant IHC, Manual  Interpretation  Right axillary lymph node , specimen for PD-L1 immunohistochemistry studies (clone 22C3, [employer] [facility]  [employer], [place], [place]; using a proprietary detection system) ([id number]):  Reported carcinoma site of origin: Breast  Result: The percent of PD-L1 positive cells based upon the total number of tumor cells (combined positive  score, CPS) is greater than or equal to 10.  Interpretation: Studies suggest that positive PD-L1 immunohistochemistry in tumor cells and/or tumorassociated  immune cells may predict tumor response to therapy with immune checkpoint inhibitors. This  result should not be used as the sole factor in determining treatment, as other factors (for example, tumor  mutation burden, and microsatellite instability) have been also studied as predictive markers.          CT neck/chest/abd/pelvis w/contrast 4/26/2019   The previously described right hilar mass is no longer visible.  There is persistent volume loss in the right middle lobe this appears similar to the prior PET-CT February 21, 2019.    Persistent abnormal right axillary node today measuring about 15 mm compared 18 mm prior.  The positioning of the adjacent nodes is slightly different likely accounting for the slight difference in measurement.    Cluster of tree-in-bud nodular densities left lower lobe series 2, image 93.  This may be  related to small airways disease though serial follow-up suggested.      PET/CT 6/20/2019   New and worsening hypermetabolic lymph nodes concerning for metastatic breast cancer as described above.  Tissue sampling would be required for definitive diagnosis.      2-D echo 6/27/2019   Normal left ventricular systolic function. The estimated ejection fraction is 55%  Concentric left ventricular remodeling.  Normal LV diastolic function.  Normal right ventricular systolic function.  Moderate left atrial enlargement.  Mild right atrial enlargement.  Mild aortic regurgitation.  Mild mitral regurgitation.  Mild tricuspid regurgitation.  Normal central venous pressure (3 mm Hg).  The estimated PA systolic pressure is 25 mm Hg      PET/CT 9/19/2019   Favorable response to therapy with interval decrease in size and uptake of several right axillary lymph nodes and a supraclavicular lymph node.  Mild residual activity may indicate viable tumor.    No new hypermetabolic findings worrisome for malignancy.    PET/CT 2/28/2020  1.  No evidence of hypermetabolic tumor.  Continued improvement of the right axilla status post adjuvant radiation.    2.  No new hypermetabolic lesions      CTA 6/17/2020  1. No evidence of pulmonary embolus. No aortic dissection.   2. There is no evidence for new or progressive disease in the chest as compared to PET/CT 6/20/2019 in this patient with history of right breast cancer and right lung neoplasm. The patient does have a more recent PET/CT 2/28/2020 from an outside facility per the electronic medical record although images are not available for direct comparison. Correlation with these images may be beneficial. Continued long-term surveillance recommended.  3. Stable appearance of the treated tumor in the right hilum/middle lobe.  4. Stable treated right breast cancer and axillary adenopathy without evidence for developing breast mass or new right axillary adenopathy.  5. Stable tiny  nodularity/tree-in-bud densities in the left lung, probably postinfectious or inflammatory.  6. Wall thickening of the esophagus may be correlated for esophagitis. Possible tiny hiatus hernia.  7. Slight bronchial wall thickening may be correlated for bronchitis.  8. Mild to moderate emphysema as before.  9. Reflux of contrast into the IVC and hepatic veins is nonspecific but may be correlated for elevated right heart pressures.  10. Coronary calcifications and/or stents similar to prior.    Echo 5/21/2020  Technically difficult study.   Normal left ventricular systolic function.   Left ventricular ejection fraction is estimated at 55%.   Severe mitral annular calcification.   Mild mitral valve regurgitation.   Aortic valve sclerosis without stenosis or regurgitation.     PFTs 6/17/2020  Spirometry reveals a very severe obstructive lung defect, which does not significantly improve post bronchodilators. Lung volumes revealed air trapping. Diffusing capacity was moderately impaired.        Del 6/26/2020  BI-RADS Category:   Overall: 2 - Benign      PET/CT 10/23/2020   Impression:     Stable mildly hypermetabolic right axillary lymph node/nodular density contralateral to the site of injection.  Differential considerations include metastatic lymph node, reactive lymph node from benign right upper extremity process, and fat necrosis.  Recommend physical examination of the upper extremity and consideration of ultrasound for further evaluation of the node/nodule and possible image guided biopsy.  Otherwise, attention on follow-up.     Otherwise, no other hypermetabolic disease identified      Mammo diagnostic right /US 11/4/2020   Impression:   1. No suspicious finding either on mammogram or ultrasound at the site of the palpable lump in the right breast at 10:00 o'clock. A bilateral mammogram is recommended in June 2020 to return to the patient to annual screening.   2. Redemonstration of the morphologically abnormal  lymph node in the right axilla that contains a biopsy clip, compatible with the known recurrence metastatic breast cancer that has been treated with chemotherapy. The appearance of this lymph node is unchanged from 06/26/2020 and overall appears smaller when compared to 03/11/2019.     Abd US 12/11/2020   Impression:     1. Echogenic liver likely representing hepatic steatosis.  2. Right upper quadrant ultrasound otherwise is unremarkable.     PET/CT 10/14/21     Impression:     1.  No definite evidence of hypermetabolic tumor.  Interval resolution of hypermetabolic right axillary lymph nodes.  No new hypermetabolic findings.     2.  Persistent low-grade diffuse cutaneous uptake which is nonspecific.    MRI shoulder w w/out contrast 1/26/22   Impression:     Mild edema and postcontrast enhancement at the myotendinous junction of the supraspinatus and infraspinatus, it is nonspecific and could be due to degenerative change or tendinosis.     Small area of interstitial tear at the footprint insertion of the infraspinatus tendon.     No large rotator cuff tear.     No advanced degenerative change.     No osseous lesions.     PET/CT 1/26/22  Impression:In this patient with breast cancer, there is no evidence of hypermetabolic tumor to suggest recurrent or metastatic disease.   Less extensive but, nonspecific, low-grade diffuse cutaneous uptake of the right breast.       PET/CT 4/27/22  Impression:     1.  No FDG avid disease to suggest recurrence or metastatic disease.     Unchanged right breast skin thickening with mild FDG uptake.       PET/CT 7/13/22   Impression:     In this patient with lung and breast cancer, there is new left lower lobe opacification with mild radiotracer uptake which may represent aspiration, pneumonia, or malignancy.  Tissue sampling would be required for definitive diagnosis.     Unchanged right breast skin thickening with mild radiotracer uptake.    CT chest w/contrast 8/25/22    Decreasing  patchy pulmonary consolidation within the left lower lobe when compared to prior PET-CT scan dated 07/13/2022.  The overall appearance of the patchy consolidation as well as the moderate improvement when compared to the prior study suggest a benign etiology such as left lower lobe pneumonia.     Persistent band of atelectasis/scarring within the right middle lobe, stable dating back to 04/26/2019.     Coronary artery atherosclerosis in a multi vessel distribution.     There are no measurable lesions per RECIST criteria.    PET /CT  11/21/22 shows New right lower lobe infiltrate worrisome for pneumonia or aspiration.Chronic infiltrate right middle lobe.   Resolution of the left lower lobe infiltrate.    Mammo 7/26/22  BI-RADS Category:   Overall: 2 - Benign        CT chest with contrast 11/21/22    Impression:     New right lower lobe infiltrate worrisome for pneumonia or aspiration.     Chronic infiltrate right middle lobe.     Resolution of the left lower lobe infiltrate.     Coronary artery calcifications.         PET/CT 1/11/23  Impression:     1. In this patient with lung and breast cancer, there is stable right breast skin thickening with mild radiotracer uptake.  2. Interval resolution of hypermetabolic opacification in the left lower lobe as compared to FDG PET-CT 07/14/2022.  Interval improvement in patchy opacities in the right lower lobe as compared to CT 11/21/2022    .                Electronically Signed By: Tapan Young MD 4/16/2023 23:17 CDT, [facility]  Narrative    PET/CT 4/14/23  [facility]  HISTORY:       Malignant neoplasm of female breast, unspecified estrogen receptor status, unspecified laterality, unspecified site of breast (CMS/HCC).       ICD10:  C50.919   Malignant neoplasm of female breast, unspecified estrogen receptor status, unspecified laterality, unspecified site of breast (CMS/HCC)       REFERENCE EXAMS:     7/13/2022 PET CT ([facility]) (no images, report only)      6/20/2019 PET CT ([facility])       TECHNIQUE:     PET/CT with attenuation correction.     Dose:  13.62 mCi of FDG-18     Injection site:  left wrist     Field of view:  Skull base to thighs.     Arm position:  above head     Baseline glucose:  128 mg/dL       FINDINGS:       All SUV values are max SUV.     Head/Neck:     Physiologic uptake of radiotracer.         Thorax:     Right portacath.       Cutaneous thickening in the right breast (SUV=1.58).       Calcification of the mitral valve, similar to 6/20/2019.       Coronary artery atherosclerotic calcification.     Atelectasis/scarring along the inferior aspect of the right major fissure, similar to 6/20/2019.         Abdomen/Pelvis:     Physiologic uptake in the genitourinary system.     Physiologic uptake in the intestines.       Patchy atherosclerotic calcification in the aorta and iliac arteries.       Impression      No opacity in the left lower lobe on the current exam to correspond to a left lower lobe opacity described on the 7/13/2022 PET CT report.  Images from the 7/13/2022 PET CT are not available at the time of this report.       Cutaneous thickening in the right breast with mild uptake.  There was a similar finding described on the 71/3/2022 PET CT report.           Electronically Signed By: Tapan Young MD 4/16/2023 23:17 CDT, [facility]          Mammo screening bilateral 8/7/23  BI-RADS Category: Overall: 2 - Benign     PET /CT ( NOT DOTATATE) 4/14/23 No opacity in the left lower lobe on the current exam to correspond to a left lower lobe opacity described on the 7/13/2022 PET CT report.  Images from the 7/13/2022 PET CT are not available at the time of this report.   Cutaneous thickening in the right breast with mild uptake.  There was a similar finding described on the 71/3/2022 PET CT report.       PET/CT 11/14/23    Impression:     Similar appearing right breast skin thickening with mild hypermetabolic activity.  No new  focal abnormal tracer uptake elsewhere.      EXAMINATION: 3/19/24   NM PET CT FDG SKULL BASE TO MID THIGH     CLINICAL HISTORY:  Breast cancer, invasive, stage IV, assess treatment response; Malignant neoplasm of unspecified site of unspecified female breast     TECHNIQUE:  12.8 mCi of F18-FDG was administered intravenously in the left antecubital fossa.  After an approximately 60 min distribution time, PET/CT images were acquired from the skull base to mid thigh.  Transmission images were acquired to correct for attenuation using a whole body low-dose CT scan without IV contrast with the arms positioned above the head. Glycemia at the time of injection was 116 mg/dL.     COMPARISON:  NM PET-CT 11/14/2023, 01/11/2023     FINDINGS:  Quality of the study: Adequate.     In the head and neck, there are no hypermetabolic lesions worrisome for malignancy. There are no hypermetabolic mucosal lesions, and there are no pathologically enlarged or hypermetabolic lymph nodes.  Prominent scattered physiologic muscular activity.     In the chest, there is similar appearing mild right breast skin thickening with decreased hypermetabolic activity now measuring 2.3 (image 103), previously SUV max 3.2.     There is a 3 mm left axillary lymph node with mild uptake, SUV max 2.6 (axial image 56), favored to represent reactive etiology.  Attention on follow-up.     There are no concerning pulmonary nodules or masses, and there are no pathologically enlarged or hypermetabolic lymph nodes.  Prominent physiologic tracer activity throughout thoracic musculature.     In the abdomen and pelvis, there is physiologic tracer distribution within the abdominal organs and excretion into the genitourinary system.  Similar mildly hypermetabolic focus in the 1st portion of the duodenum without CT correlate measuring SUV max 5.5 (image 134), possibly physiologic although nonspecific.     In the bones, there are no hypermetabolic lesions worrisome for  malignancy.  Prominent focus of hypermetabolic uptake in the left piriformis without underlying CT correlate (image 220), presumably physiologic.     Additional findings: Right chest wall implanted tunnel central venous catheter tip terminating in the superior vena cava.  Stable punctate pulmonary micronodule in the left upper lobe (axial series 3, image 73).  Coronary arterial and valvular calcific plaque.  Advanced aortoiliac calcific plaque.     Impression:     Similar appearing mild right breast skin thickening with decreased hypermetabolic activity. No new abnormal tracer uptake elsewhere worrisome for malignancy.     Additional findings as above.    CT c/a/p 5/3/24   Impression:     No acute abdominopelvic abnormality.  Specifically, no evidence for acute pancreatitis as clinically questioned.     Vague area of peripheral ground-glass with grouped micro-nodules at the peripheral right lung base with appearance suggesting sequela of infectious or non-infectious bronchiolitis.     Similar degree of asymmetric skin thickening at the right breast.  To correlate with mammographic and oncologic history.     Ectatic infrarenal abdominal aorta and additional findings as above.       Assessment:       1. Recurrent breast cancer, unspecified laterality    2. History of lung cancer    3. Chronic obstructive pulmonary disease, unspecified COPD type    4. History of TIA (transient ischemic attack)          Plan:     1.,2.   Pt with hx of Stage 1A invasive ductal carcinoma rt breast s/p rt segmental mastectomy and SLN bx 7/11/2014 ER/AZ neg HER 2 jose maria neg [id number](i-).  S/p adjuvant RT completed 9/2014 Pt declined adjuvant chemo  Pt  hospitalized 11/2017 with acute-on-chronic  resp failure  Abnormal CT imaging revealing Large right hilar mass with involvement of  adjacent segmental pulmonary arterial branches and bronchi with associated postobstructive atelectasis  and involvement of mediastinal and axillary adenopathy   S/p  rt axillary LN bx ( outside facility) - metastatic poorly differentiated carcinoma of unknown primary  site  status post  lung biopsy 1/31/2017 -benign lung tissue  PET/CT 1/26/2018   Intense FDG uptake within the known right infrahilar lung mass, compatible with malignancy.   Intensely hypermetabolic right axillary, retropectoral, and lower right cervical lymph nodes, compatible with metastases.  Abnormal FDG uptake along right breast skin thickening, new from prior chest CTA and mammogram.  PET/CT 11/14/23 Similar appearing right breast skin thickening with mild hypermetabolic activity.  No new focal abnormal tracer uptake elsewhere.  Repeat lung bx 2/14/2018 revealed Squamous Cell CA lung PD-L1 10% EGFR NEGALK NEG  Pt treated for advanced squamous cell CA of lung She s/p  cycle 6 of carboplatin/Abraxane Day1 completed 7/2/2018 ( day 15 held due to prolonged cytopenias)  She completed therapy with near CR     PET/CT 2/21/2019 showed increased size and irregularity of the right axillary lymph node with increased FDG avidity     MAMMO/US rt breast 3/2019  reveals suspicious findings rt axilla LN.  Biopsy of the lymph node measuring 1.4 x 1.1 x 1.3     Pt s/p  rt axillary LN bxPositive for poorly differentiated carcinoma.- likely breast primary ERneg PRneg Her 2 neg    Outside review of specimen(s) revealed  lung tumor corresponds to a squamous cell carcinoma and appears to be unrelated to the invasive ductal carcinoma of the breast. It was determined The axillary mass, in my opinion, corresponded  to metastases of the breast primary. Although it is a poorly differentiated carcinoma, theimmunophenotype is most consistent with the one that the breast primary exhibited, and is inconsistent with a metastatic squamous cell carcinoma.     CT imaging 4/26/2019 persistent rt axillary LAD, no other sites of disease     Lymph node biopsy 3/11/2019 Right axilla, mass, core biopsy:Positive for poorly differentiated  carcinoma.favor breast primary     PET/CT 6/20/2019  New and worsening hypermetabolic lymph nodes concerning for metastatic breast cancer     Previously Discussed  imaging findings in detail with patient which reveals new and worsening hypermetabolic melena metabolic lymph nodes including hypermetabolic supraclavicular node.  Therefore, at treatment option will be systemic chemotherapy in light of the recent imaging findings.  She will be followed by her surgical oncologist Dr. Christopher      IT was determined to proceed with systemic chemotherapy   S/p  AC [id number] x 3 wks x 4 wks completed 9/6/2019   ( pt has not received in past)      PET/CT 9/19/2019 shows disease response with interval decrease in size and uptake of several right axillary lymph nodes and a supraclavicular lymph node.  Mild residual activity may indicate viable tumor.No new hypermetabolic findings worrisome for malignancy.    Pt followed by  Breast Surgery and plan was  for possible   ALND. Pt with Recurrent cancer in her right axilla and right supraclavicular fossa.   She completed chemotherapy 9/6/2019 with near CR  It was determined  Radiation will be given to the original areas of hypermetabolic activity in the right axilla and right supraclavicular region    Pt completed  RT 12/16/2019     PET/CT 1/14/21 shows   New right axillary hypermetabolic lymph nodes with preserved normal architecture suggestive of reactive nodes.  Stable appearing previously identified hypermetabolic lymph node with mild increase in surrounding fat stranding.        Findings previously d/w with treating breast surgeon and it was determined to cont to monitor and close f/u as pt is heavily pre treated, has received RT to site   Pt acknowledged understanding and agreeable with plan     PET/CT imaging  5/20/21 shows Continued increase in both size and hypermetabolic activity of right axillary level 1 lymph nodes a new, mildly hypermetabolic cutaneous thickening of the  right breast.     Right breast, skin, punch biopsy:Negative for carcinoma    LYMPH NODE, RIGHT AXILLA, BIOPSY: 6/16/21   - Metastatic poorly-differentiated carcinoma, consistent with breast primary triple neg  PD-L1 immunohistochemistry studies(combined positive score, CPS) is greater than or equal to 10   PET/CT showed  No definite evidence of hypermetabolic tumor.  Interval resolution of hypermetabolic right axillary lymph nodes.  No new hypermetabolic findings.      S/p C16 pembrolizumab 6/15/22   Last  PET/CT imaging shows  new left lower lobe opacification with mild radiotracer uptake which may represent aspiration, pneumonia, or malignancy.  Recent follow-up imaging studies decreasing patchy pulmonary consolidation within the left lower lobe when compared to prior PET-CT scan dated 07/13/2022.  Plan to remain off of therapy   Follow-up PET/CT imaging 1/11/23 Interval resolution of hypermetabolic opacification in the left lower lobe as compared to FDG PET-CT 07/14/2022.  Interval improvement in patchy opacities in the right lower lobe as compared to CT 11/21/2022   follow up PET imaging [id number] -  performed at [facility] .([employer] mobile was not available at  since broken )No opacity in the left lower lobe on the current exam to correspond to a left lower lobe opacity described on the 7/13/2022 PET CT report.  Images from the 7/13/2022 PET CT are not available at the time of this report.       Follow up PET imaging 3/19/24 shows Similar appearing mild right breast skin thickening with decreased hypermetabolic activity. No new abnormal tracer uptake elsewhere worrisome for malignancy.   CT imaging 5/2024 SHERRILL recurrence  Plan follow-up PET imaging   Patient clinically stable  Cont to monitor     3. stable   F/b Pulmonology  Pt on supp 02 at night   Cont MDI and O2 as prescribed       4. Stable  On plavix.aspirin, lipitor 40 mg   Follow up with PCP and neurology for med mgmt      PET on Tuesday prior to f/u   schedule  next PAC flush on same day as visit   Cbc,cmp, mag prior to f/u 2 mos

## 2024-09-17 NOTE — PLAN OF CARE
Pt arrived to the Infusion unit for IV medication. Pt reports no complaints at this time. Pt received port flush. Port accessed without difficulty. Labs drawn. Heparin locked. Port deaccessed with no difficulty. Pt verbalizes no other needs at this time. Pt ambulates independently upon discharge. No acute distress noted.

## 2024-09-27 NOTE — TELEPHONE ENCOUNTER
No care due was identified.  [facility] Embedded Care Due Messages. Reference number: [id number].   9/27/2024 11:18:57 AM CDT

## 2024-09-30 RX ORDER — DICLOFENAC SODIUM 50 MG/1
50 TABLET, DELAYED RELEASE ORAL 2 TIMES DAILY PRN
Qty: 45 TABLET | Refills: 2 | Status: SHIPPED | OUTPATIENT
Start: 2024-09-30

## 2024-10-03 ENCOUNTER — TELEPHONE (OUTPATIENT)
Dept: OTOLARYNGOLOGY | Facility: CLINIC | Age: 79
End: 2024-10-03
Payer: MEDICARE

## 2024-10-03 NOTE — TELEPHONE ENCOUNTER
----- Message from Med Assistant Rendon sent at 10/3/2024  1:35 PM CDT -----  Contact: Pt    ----- Message -----  From: Selvin De Leon  Sent: 10/3/2024   1:04 PM CDT  To: Master [facility] Staff     .Type:  Needs Medical Advice    Who Called: pt  Symptoms (please be specific):  right ear blocked   How long has patient had these symptoms:   1 week  Pharmacy name and phone #:  [facility] - [facility] [address] [facility] [address]   Phone: [phone]  Fax: [phone]  Would the patient rather a call back or a response via [facility]?  Call back  Best Call Back Number: [phone]  Additional Information: Pt. Is requesting some drips for her right ear been blocked about 1 week.

## 2024-10-04 RX ORDER — OFLOXACIN 3 MG/ML
5 SOLUTION AURICULAR (OTIC) 2 TIMES DAILY
Qty: 10 ML | Refills: 0 | Status: SHIPPED | OUTPATIENT
Start: 2024-10-04

## 2024-10-04 NOTE — TELEPHONE ENCOUNTER
----- Message from Med Assistant Julieta sent at 10/4/2024  7:58 AM CDT -----  Contact: Pt  They don't have my chart  ----- Message -----  From: Sabino Mckee MD  Sent: 10/3/2024   6:12 PM CDT  To: Julieta Hubbard MA    Could you do an e-visit for this.  ----- Message -----  From: Julieta Hubbard MA  Sent: 10/3/2024   1:35 PM CDT  To: Sabino Mckee MD      ----- Message -----  From: Selvin De Leon  Sent: 10/3/2024   1:04 PM CDT  To: Master [facility] Staff     .Type:  Needs Medical Advice    Who Called: pt  Symptoms (please be specific):  right ear blocked   How long has patient had these symptoms:   1 week  Pharmacy name and phone #:  [facility] - [place], [place] - [address] [facility] [address]   Phone: [phone]  Fax: [phone]  Would the patient rather a call back or a response via [facility]?  Call back  Best Call Back Number: [phone]  Additional Information: Pt. Is requesting some drips for her right ear been blocked about 1 week.

## 2024-10-11 ENCOUNTER — NURSE TRIAGE (OUTPATIENT)
Dept: ADMINISTRATIVE | Facility: CLINIC | Age: 79
End: 2024-10-11
Payer: MEDICARE

## 2024-10-11 NOTE — TELEPHONE ENCOUNTER
"Patient states she was seen in a Walk-In clinic in [facility] on last week for ear congestion and diagnosed with Right "Inner Ear Vertigo". Patient states she was prescribed ear drops and has completed the regimen. Patient states ear congestion has not resolved and persists at this time.     Care Advice given per Ear Congestion - Adult Guideline. Patient advised to See PCP or Visit an Urgent Care Center within Three (3) days. Patient also advised to contact the [facility] on Call Service for any worsening symptoms. Appointment scheduled for Monday, 10/14/24 @ 8:40 AM. Patient states understanding of care advice and date/time/location of appointment.     Reason for Disposition   Ear congestion lasts > 3 days and no improvement after using Care Advice (Exception: Ear congestion is a chronic symptom.)    Additional Information   Negative: Ear pain is main symptom   Negative: Hearing loss (complete or partial) is main symptom   Negative: Earwax is main concern   Negative: Nasal allergies (hay fever) is main concern (e.g., nasal allergies are acting up)   Negative: Earache lasts > 1 hour   Negative: Pus or cloudy discharge from ear canal   Negative: Patient wants to be seen    Protocols used: Ear - Congestion-A-OH    "

## 2024-10-16 ENCOUNTER — TELEPHONE (OUTPATIENT)
Dept: ADMINISTRATIVE | Facility: CLINIC | Age: 79
End: 2024-10-16
Payer: MEDICARE

## 2024-10-16 NOTE — TELEPHONE ENCOUNTER
Called pt, informed pt I was calling to remind pt of her in office EAWV on 10/17/24; clinic location provided to patient; pt confirmed appointment

## 2024-10-17 ENCOUNTER — OFFICE VISIT (OUTPATIENT)
Dept: FAMILY MEDICINE | Facility: CLINIC | Age: 79
End: 2024-10-17
Payer: MEDICARE

## 2024-10-17 VITALS
SYSTOLIC BLOOD PRESSURE: 110 MMHG | BODY MASS INDEX: 27.97 KG/M2 | HEIGHT: 63 IN | HEART RATE: 58 BPM | WEIGHT: 157.88 LBS | DIASTOLIC BLOOD PRESSURE: 70 MMHG | TEMPERATURE: 98 F | OXYGEN SATURATION: 97 % | RESPIRATION RATE: 20 BRPM

## 2024-10-17 DIAGNOSIS — D84.821 IMMUNODEFICIENCY DUE TO CHEMOTHERAPY: ICD-10-CM

## 2024-10-17 DIAGNOSIS — T45.1X5A IMMUNODEFICIENCY DUE TO CHEMOTHERAPY: ICD-10-CM

## 2024-10-17 DIAGNOSIS — I25.118 CORONARY ARTERY DISEASE OF NATIVE ARTERY OF NATIVE HEART WITH STABLE ANGINA PECTORIS: Chronic | ICD-10-CM

## 2024-10-17 DIAGNOSIS — Z79.899 IMMUNODEFICIENCY DUE TO CHEMOTHERAPY: ICD-10-CM

## 2024-10-17 DIAGNOSIS — J30.9 ALLERGIC SINUSITIS: ICD-10-CM

## 2024-10-17 DIAGNOSIS — Z00.00 ENCOUNTER FOR PREVENTIVE HEALTH EXAMINATION: Primary | ICD-10-CM

## 2024-10-17 DIAGNOSIS — C50.919 PRIMARY MALIGNANT NEOPLASM OF BREAST WITH METASTASIS: Chronic | ICD-10-CM

## 2024-10-17 DIAGNOSIS — I50.32 CHRONIC HEART FAILURE WITH PRESERVED EJECTION FRACTION: ICD-10-CM

## 2024-10-17 DIAGNOSIS — Z23 NEED FOR IMMUNIZATION AGAINST INFLUENZA: ICD-10-CM

## 2024-10-17 DIAGNOSIS — J43.2 CENTRILOBULAR EMPHYSEMA: Chronic | ICD-10-CM

## 2024-10-17 DIAGNOSIS — I10 PRIMARY HYPERTENSION: Chronic | ICD-10-CM

## 2024-10-17 DIAGNOSIS — C34.91 SQUAMOUS CELL CARCINOMA OF RIGHT LUNG: ICD-10-CM

## 2024-10-17 DIAGNOSIS — C34.81 MALIGNANT NEOPLASM OF OVERLAPPING SITES OF RIGHT LUNG: ICD-10-CM

## 2024-10-17 DIAGNOSIS — Z99.81 DEPENDENCE ON SUPPLEMENTAL OXYGEN: ICD-10-CM

## 2024-10-17 PROBLEM — C79.9 SECONDARY MALIGNANT NEOPLASM OF UNSPECIFIED SITE (CODE): Status: RESOLVED | Noted: 2022-02-01 | Resolved: 2024-10-17

## 2024-10-17 PROCEDURE — 99999 PR PBB SHADOW E&M-EST. PATIENT-LVL IV: CPT | Mod: PBBFAC,HCNC,, | Performed by: NURSE PRACTITIONER

## 2024-10-17 RX ORDER — AZELASTINE 1 MG/ML
SPRAY, METERED NASAL
COMMUNITY
Start: 2023-12-21

## 2024-10-17 RX ORDER — NEOMYCIN SULFATE, POLYMYXIN B SULFATE AND HYDROCORTISONE 10; 3.5; 1 MG/ML; MG/ML; [USP'U]/ML
3 SUSPENSION/ DROPS AURICULAR (OTIC) 4 TIMES DAILY
Qty: 10 ML | Refills: 1 | Status: SHIPPED | OUTPATIENT
Start: 2024-10-17

## 2024-10-17 RX ORDER — MONTELUKAST SODIUM 10 MG/1
TABLET ORAL
COMMUNITY
Start: 2023-12-21

## 2024-10-17 NOTE — PROGRESS NOTES
"  Ade Castellano presented for a  Medicare AWV and comprehensive Health Risk Assessment today. The following components were reviewed and updated:    Medical history  Family History  Social history  Allergies and Current Medications  Health Risk Assessment  Health Maintenance  Care Team         ** See Completed Assessments for Annual Wellness Visit within the encounter summary.**         The following assessments were completed:  Living Situation  CAGE  Depression Screening  Timed Get Up and Go  Whisper Test  Cognitive Function Screening  Nutrition Screening  ADL Screening  PAQ Screening      Opioid documentation:  Patient does not have a current opioid prescription.        Vitals:    10/17/24 1040   BP: 110/70   Pulse: (!) 58   Resp: 20   Temp: 97.7 °F (36.5 °C)   TempSrc: Oral   SpO2: 97%   Weight: 71.6 kg (157 lb 13.6 oz)   Height: 5' 3" (1.6 m)     Body mass index is 27.96 kg/m².  Physical Exam  Vitals and nursing note reviewed.   Constitutional:       General: She is not in acute distress.     Appearance: Normal appearance. She is well-developed and well-groomed. She is not ill-appearing.   HENT:      Head: Normocephalic and atraumatic.      Right Ear: External ear normal.      Left Ear: External ear normal.      Nose: Nose normal.      Mouth/Throat:      Lips: Pink.      Mouth: Mucous membranes are moist.   Eyes:      General: Lids are normal. Vision grossly intact. Gaze aligned appropriately.      Conjunctiva/sclera: Conjunctivae normal.   Neck:      Trachea: Phonation normal.   Pulmonary:      Effort: Pulmonary effort is normal. No accessory muscle usage or respiratory distress.   Abdominal:      General: Abdomen is flat. There is no distension.   Musculoskeletal:      Cervical back: Neck supple.   Skin:     General: Skin is warm and dry.      Findings: No rash.   Neurological:      General: No focal deficit present.      Mental Status: She is alert and oriented to person, place, and time. Mental status is " at baseline.      Motor: No abnormal muscle tone.      Gait: Gait normal.   Psychiatric:         Attention and Perception: Attention and perception normal.         Mood and Affect: Mood and affect normal.         Speech: Speech normal.         Behavior: Behavior normal. Behavior is cooperative.         Thought Content: Thought content normal.         Cognition and Memory: Cognition and memory normal.         Judgment: Judgment normal.               Diagnoses and health risks identified today and associated recommendations/orders:    1. Encounter for preventive health examination  Health maintenance reviewed and up to date, will f/u with PCP for routine preventative care  Review for Opioid Screening: Pt does not have Rx for Opioids   Review for Substance Use Disorders: Patient does not use substance     2. Metastatic breast cancer  Hx of chemo/radiation with recurrent mass, closely monitored by oncology    3. Squamous cell carcinoma of right lung  Hx of chemo/radiation with recurrent mass, closely monitored by oncology    4. 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 Centrilobular emphysema  Breathing stable without acute exacerbation, using oxygen at home PRN, continue pulmonology plan     7. Dependence on supplemental oxygen  Use at home as needed though not continuous as instructed by pulmonology    8. Coronary artery disease of native artery of native heart with stable angina pectoris  Controlled on current regimen, on chronic imdur vasodilation   Current treatment plan is effective, no change in therapy.  Reviewed diet, exercise and weight control.  Recommended sodium restriction.  Reviewed medications and side effects in detail.  Reviewed potential future medication changes and side effects.  Use of aspirin  to prevent MI and TIA's discussed.  Use and side effects of nitroglycerine reviewed, call 911 if chest pain unrelieved by 3 tablets.  For claudication, exercise to point of pain, rest, then continue.    9. Chronic heart failure with preserved ejection fraction  Disease process and medications discussed. Questions answered fully.  Emphasized salt restriction.  Encouraged daily monitoring of the patient's weight.  Encouraged regular exercise.  Continue cardiology plan    10. Primary hypertension  Continue current treatment regimen.  Continue current medications.  Dietary sodium restriction.  Regular aerobic exercise.  Weight loss.  Patient Education: Reviewed risks of hypertension and principles of treatment.    11. Allergic sinusitis  No acute exacerbation, continue ENT plan   - neomycin-polymyxin-hydrocortisone (CORTISPORIN) 3.5-10,000-1 mg/mL-unit/mL-% otic suspension; Place 3 drops into both ears 4 (four) times daily.  Dispense: 10 mL; Refill: 1    12. Need for immunization against influenza  - influenza (adjuvanted) (Fluad) 45 mcg/0.5 mL IM vaccine (> or = 64 yo) 0.5 mL      Provided Ade with a 5-10 year written screening schedule and personal prevention plan. Recommendations were developed using the USPSTF age appropriate recommendations. Education, counseling, and referrals were provided as needed. After Visit Summary printed and given to patient which includes a list of additional screenings\tests needed.    No follow-ups on file.    Sofía Connell NP  I offered to discuss advanced care planning, including how to pick a person who would make decisions for you if you were unable to make them for yourself, called a health care power of , and what kind of decisions you might make such as use of life sustaining treatments such as ventilators and tube feeding when faced with a life limiting illness recorded on a living will that they will need to know. (How you want to be cared for as you near the  end of your natural life)     X  Patient has advanced directives on file, which we reviewed, and they do not wish to make changes.

## 2024-10-17 NOTE — PATIENT INSTRUCTIONS
Counseling and Referral of Other Preventative  (Italic type indicates deductible and co-insurance are waived)    Patient Name: Ade Castellano  Today's Date: 10/17/2024    Health Maintenance       Date Due Completion Date    TETANUS VACCINE Never done ---    Shingles Vaccine (1 of 2) Never done ---    RSV Vaccine (Age 60+ and Pregnant patients) (1 - 1-dose 75+ series) Never done ---    COVID-19 Vaccine (3 - Moderna risk series) 12/30/2021 12/2/2021    DEXA Scan 03/20/2025 3/20/2023    Hemoglobin A1c (Prediabetes) 05/22/2025 5/22/2024    Lipid Panel 05/21/2029 5/21/2024        No orders of the defined types were placed in this encounter.    The following information is provided to all patients.  This information is to help you find resources for any of the problems found today that may be affecting your health:                  Living healthy guide: www.[facility].[place].[facility]      Understanding Diabetes: www.diabetes.org      Eating healthy: www.cdc.gov/healthyweight      CDC home safety checklist: www.cdc.gov/steadi/patient.html      Agency on Aging: www.goea.[place].[facility]      Alcoholics anonymous (AA): www.aa.org      Physical Activity: www.anca.nih.gov/uy0cpsf      Tobacco use: www.quitwithusla.org

## 2024-10-17 NOTE — PROGRESS NOTES
Patient identified using full name and . Fluad IM given and tolerated. Patient observed for 15 minutes for any drug reactions or side effects.  No adverse reaction noted.  Patient released from clinic in stable condition.

## 2024-10-28 DIAGNOSIS — M79.605 LEG PAIN, BILATERAL: Primary | ICD-10-CM

## 2024-10-28 DIAGNOSIS — G62.9 NEUROPATHY: Primary | ICD-10-CM

## 2024-10-28 DIAGNOSIS — M79.604 LEG PAIN, BILATERAL: Primary | ICD-10-CM

## 2024-10-28 DIAGNOSIS — C50.919 PRIMARY MALIGNANT NEOPLASM OF BREAST WITH METASTASIS: ICD-10-CM

## 2024-10-28 DIAGNOSIS — C50.919 RECURRENT BREAST CANCER, UNSPECIFIED LATERALITY: ICD-10-CM

## 2024-10-28 DIAGNOSIS — Z85.118 HISTORY OF LUNG CANCER: ICD-10-CM

## 2024-10-29 ENCOUNTER — TELEPHONE (OUTPATIENT)
Dept: HEMATOLOGY/ONCOLOGY | Facility: CLINIC | Age: 79
End: 2024-10-29
Payer: MEDICARE

## 2024-11-08 ENCOUNTER — TELEPHONE (OUTPATIENT)
Dept: OTOLARYNGOLOGY | Facility: CLINIC | Age: 79
End: 2024-11-08
Payer: MEDICARE

## 2024-11-08 NOTE — TELEPHONE ENCOUNTER
----- Message from Med Assistant Julieta sent at 11/7/2024  4:50 PM CST -----  Good afternoon I spoke with Ms. Castellano, and she is not able to come to main [place] to be seen, Dr Mckee will like one of the other providers to see her at the [facility] location, can someone call to get her scheduled       Julieta Lowe  ----- Message -----  From: Sabino Mckee MD  Sent: 11/7/2024   3:07 PM CST  To: Julieta Hubbard MA    Will have to make an appt with any ear provider.  ----- Message -----  From: Julieta Hubbard MA  Sent: 11/7/2024   1:44 PM CST  To: Sabino Mckee MD    Pt says the drops didn't work is there anything else you can do or tell her to do     ThanksJulieta  ----- Message -----  From: Naima Wakefield MA  Sent: 11/7/2024  12:57 PM CST  To: Julieta Hubbard MA      ----- Message -----  From: Lynnette Barillas  Sent: 11/7/2024  12:27 PM CST  To: Master Sabino Staff    Type :  Needs Medical Advice    Who Called: pt  Symptoms (please be specific): fluid in ears   How long has patient had these symptoms:  fluid in ears  Pharmacy name and phone #:  no  Would the patient rather a call back or a response via [facility]? call  Best Call Back Number: [phone]  Additional Information:  appt

## 2024-11-08 NOTE — TELEPHONE ENCOUNTER
Spoke with pt, got her scheduled with jamie then appt with Dr. Tariq on 11/27/2024, offered sooner pt declined.

## 2024-11-12 ENCOUNTER — HOSPITAL ENCOUNTER (OUTPATIENT)
Dept: RADIOLOGY | Facility: HOSPITAL | Age: 79
Discharge: HOME | End: 2024-11-12
Attending: INTERNAL MEDICINE
Payer: MEDICARE

## 2024-11-12 DIAGNOSIS — C50.919 RECURRENT BREAST CANCER, UNSPECIFIED LATERALITY: ICD-10-CM

## 2024-11-12 LAB — POCT GLUCOSE: 96 MG/DL (ref 70–110)

## 2024-11-12 PROCEDURE — 78815 PET IMAGE W/CT SKULL-THIGH: CPT | Mod: 26,HCNC,PS, | Performed by: STUDENT IN AN ORGANIZED HEALTH CARE EDUCATION/TRAINING PROGRAM

## 2024-11-12 PROCEDURE — 78815 PET IMAGE W/CT SKULL-THIGH: CPT | Mod: TC,HCNC

## 2024-11-12 PROCEDURE — A9552 F18 FDG: HCPCS | Mod: HCNC | Performed by: INTERNAL MEDICINE

## 2024-11-12 RX ORDER — FLUDEOXYGLUCOSE F18 500 MCI/ML
12 INJECTION INTRAVENOUS
Status: COMPLETED | OUTPATIENT
Start: 2024-11-12 | End: 2024-11-12

## 2024-11-12 RX ADMIN — FLUDEOXYGLUCOSE F-18 12.99 MILLICURIE: 500 INJECTION INTRAVENOUS at 09:11

## 2024-11-13 ENCOUNTER — INITIAL CONSULT (OUTPATIENT)
Dept: VASCULAR SURGERY | Facility: CLINIC | Age: 79
End: 2024-11-13
Payer: MEDICARE

## 2024-11-13 VITALS
SYSTOLIC BLOOD PRESSURE: 128 MMHG | BODY MASS INDEX: 27.72 KG/M2 | HEIGHT: 63 IN | HEART RATE: 52 BPM | DIASTOLIC BLOOD PRESSURE: 76 MMHG | WEIGHT: 156.44 LBS

## 2024-11-13 DIAGNOSIS — Z85.118 HISTORY OF LUNG CANCER: ICD-10-CM

## 2024-11-13 DIAGNOSIS — M79.605 LEG PAIN, BILATERAL: ICD-10-CM

## 2024-11-13 DIAGNOSIS — M79.604 LEG PAIN, BILATERAL: ICD-10-CM

## 2024-11-13 DIAGNOSIS — C50.919 PRIMARY MALIGNANT NEOPLASM OF BREAST WITH METASTASIS: ICD-10-CM

## 2024-11-13 PROCEDURE — 99999 PR PBB SHADOW E&M-EST. PATIENT-LVL III: CPT | Mod: PBBFAC,HCNC,, | Performed by: SURGERY

## 2024-11-13 NOTE — PROGRESS NOTES
David Latham MD, PhD  [facility]   11/13/2024    HPI:  Ade Castellano is a 79 y.o. female with a history of varicose veins, who was referred by Dr. Lowe.    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  She reports that recently she had an episode of right foot pain with burning and stinging cessation that woke her from sleep.  She described that the veins in her foot were engorged and resolve with rubbing it out.  She has not had any other episodes like this and was concern.  She denies claudication symptoms.  No wounds    Tobacco use:  Previous but not current    Patient Active Problem List   Diagnosis    Coronary artery disease of native artery of native heart with stable angina pectoris    Old myocardial infarction    Atherosclerosis of aorta    Prediabetes    DNR (do not resuscitate)    Centrilobular emphysema    Squamous cell carcinoma lung    Metastatic breast cancer    Hypertension    LOGAN (dyspnea on exertion)    Chronic heart failure with preserved ejection fraction    Cancer of overlapping sites of lung    Dependence on supplemental oxygen    Hypokalemia    Generalized weakness    Immunodeficiency due to chemotherapy    Achalasia    Allergic sinusitis     Past Medical History:   Diagnosis Date    Abnormal stress test 8/5/2020    Acute respiratory failure with hypoxia and hypercapnia 11/29/2017    Angina pectoris     Arthritis     Bell's palsy     left facial weakness    Breast cancer     RIGHT    CAD (coronary artery disease)     Cervical cancer     Chronic bronchitis     Chronic heart failure with preserved ejection fraction 8/5/2020    Chronic heart failure with preserved ejection fraction 8/5/2020    COPD (chronic obstructive pulmonary disease)     Dr. Katz    Dental bridge present     Emphysema of lung     H/O colonoscopy 06/2017    due for repeat colonsocopy in 6/2018    History of Bell's palsy     History of heart  artery stent     Dr. Ortiz  x2 stents    Hyperlipidemia     Hypertension     Lung cancer     Myocardial infarction     SUNITHA (obstructive sleep apnea)     intolerant to mask    SUNITHA (obstructive sleep apnea)     intolerant to mask     Pneumonia     Pneumonia due to other staphylococcus     PUD (peptic ulcer disease)     Severe anemia 6/11/2018    Sleep apnea     Vaginal delivery     x1     Past Surgical History:   Procedure Laterality Date    ADENOIDECTOMY      BREAST BIOPSY Right     x3    BREAST LUMPECTOMY      BREAST SURGERY      lumpectomy right side     CERVIX SURGERY      cone    COLONOSCOPY N/A 3/17/2017    Procedure: COLONOSCOPY;  Surgeon: Julio Rudd MD;  Location: [facility] ENDO;  Service: Endoscopy;  Laterality: N/A;    COLONOSCOPY N/A 6/30/2017    Procedure: COLONOSCOPY;  Surgeon: Julio Rudd MD;  Location: [facility] ENDO;  Service: Endoscopy;  Laterality: N/A;    COLONOSCOPY N/A 11/28/2018    Procedure: COLONOSCOPY;  Surgeon: Nura Urbina MD;  Location: [facility] ENDO;  Service: Endoscopy;  Laterality: N/A;    COLONOSCOPY N/A 1/29/2019    Procedure: COLONOSCOPY;  Surgeon: Emmanuel Perez MD;  Location: [facility] ENDO;  Service: Endoscopy;  Laterality: N/A;  confirmed appt-SP    COLONOSCOPY N/A 7/26/2022    Procedure: COLONOSCOPY;  Surgeon: James Healy MD;  Location: [facility] ENDO;  Service: Endoscopy;  Laterality: N/A;  fully [facility] -  mediport in [facility]    CORONARY ANGIOPLASTY WITH STENT PLACEMENT      ESOPHAGEAL MANOMETRY WITH MEASUREMENT OF IMPEDANCE N/A 7/10/2023    Procedure: MANOMETRY, ESOPHAGUS, WITH IMPEDANCE MEASUREMENT;  Surgeon: Miller Phillips MD;  Location: [facility] ([address] [place] [address]);  Service: Gastroenterology;  Laterality: N/A;  pt on oxygen 2.5L  pt requested Tuesday only  5/31 referred by Dr. iMller Phillips/instr. mailed-st  7/3-r/s, updated instructions reviewed with pt in detail via phone, pt verbalized understanding-KPvt    ESOPHAGOGASTRODUODENOSCOPY N/A 1/25/2021    Procedure: EGD  (ESOPHAGOGASTRODUODENOSCOPY);  Surgeon: Dmitry Gonzalez MD;  Location: [facility] ENDO;  Service: Endoscopy;  Laterality: N/A;  rapid test >50 miles -ml    ESOPHAGOGASTRODUODENOSCOPY N/A 11/8/2022    Procedure: EGD (ESOPHAGOGASTRODUODENOSCOPY);  Surgeon: Tapan Stevenson MD;  Location: [facility] ENDO;  Service: Endoscopy;  Laterality: N/A;  w/dilation  fully vaccinated, instructions mailed-Kpvt  11/4 pt called did not receive instructions, does not have portal or email, went over prep instructions and medication instructions with pt on the phone -LW    ESOPHAGOGASTRODUODENOSCOPY N/A 9/19/2023    Procedure: EGD (ESOPHAGOGASTRODUODENOSCOPY);  Surgeon: Miller Phillips MD;  Location: [facility] ENDO;  Service: Endoscopy;  Laterality: N/A;  botox injection  inst mailed    EYE SURGERY Bilateral 06/08/2018    cataract     LEFT HEART CATHETERIZATION Left 8/13/2020    Procedure: Left heart cath, rra, noon;  Surgeon: Guevara Skelton MD;  Location: [facility] CATH LAB;  Service: Cardiology;  Laterality: Left;  RN PREOP 8/7/2020---COVID NEGATIVE ON 8/12    PORTACATH PLACEMENT Right 01/2018    sweat glands axillary regions Bilateral     TONSILLECTOMY       Family History   Problem Relation Name Age of Onset    Cancer Mother          breast    Heart disease Mother      Breast cancer Mother      Cancer Father          lung-smoker     Cancer Sister          lung-smoker     Heart attack Sister      Cancer Maternal Grandmother      Heart disease Maternal Grandmother      Cancer Maternal Grandfather      Heart disease Maternal Grandfather      Cancer Paternal Grandmother      Cancer Paternal Grandfather      Cancer Sister          mets not sure where it started     COPD Sister      Heart disease Sister      Kidney cancer Son      Colon cancer Neg Hx      Esophageal cancer Neg Hx       Social History     Socioeconomic History    Marital status: Single   Tobacco Use    Smoking status: Former     Current packs/day: 0.00     Average packs/day: 0.3 packs/day for  50.0 years (12.5 ttl pk-yrs)     Types: Cigarettes     Start date: 1967     Quit date: 2017     Years since quittin.0    Smokeless tobacco: Never    Tobacco comments:     7 years since smoked    Substance and Sexual Activity    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     Drug use: No    Sexual activity: Not Currently     Social Drivers of Health     Financial Resource Strain: Low Risk  (10/17/2024)    Overall Financial Resource Strain (CARDIA)     Difficulty of Paying Living Expenses: Not hard at all   Food Insecurity: No Food Insecurity (10/17/2024)    Hunger Vital Sign     Worried About Running Out of Food in the Last Year: Never true     Ran Out of Food in the Last Year: Never true   Transportation Needs: No Transportation Needs (10/17/2024)    PRAPARE - Transportation     Lack of Transportation (Medical): No     Lack of Transportation (Non-Medical): No   Physical Activity: Inactive (10/17/2024)    Exercise Vital Sign     Days of Exercise per Week: 0 days     Minutes of Exercise per Session: 0 min   Stress: No Stress Concern Present (10/17/2024)    [employer] - Occupational Stress Questionnaire     Feeling of Stress : Not at all   Housing Stability: Low Risk  (10/17/2024)    Housing Stability Vital Sign     Unable to Pay for Housing in the Last Year: No     Homeless in the Last Year: No       Current Outpatient Medications:     acetaminophen (TYLENOL) 650 MG TbSR, Take 650 mg by mouth every 8 (eight) hours., Disp: , Rfl:     albuterol (PROVENTIL) 2.5 mg /3 mL (0.083 %) nebulizer solution, NEBULIZE 1 vial EVERY 6 HOURS AS NEEDED FOR WHEEZING, Disp: 540 mL, Rfl: 1    albuterol (VENTOLIN HFA) 90 mcg/actuation inhaler, Inhale 2 puffs into the lungs every 6 (six) hours as needed for Wheezing. Rescue, Disp: , Rfl:     aspirin (ECOTRIN) 81 MG EC tablet, Take 81 mg by mouth once daily. , Disp: , Rfl:     azelastine (ASTELIN) 137 mcg (0.1 %) nasal spray, 1 puff in each  "nostril Nasally Twice a day for 30 days, Disp: , Rfl:     diclofenac (VOLTAREN) 50 MG EC tablet, TAKE 1 TABLET BY MOUTH TWICE DAILY AS NEEDED (Patient not taking: Reported on 11/13/2024), Disp: 45 tablet, Rfl: 2    fluticasone propionate (FLONASE) 50 mcg/actuation nasal spray, SHAKE WELL & USE TWO SPRAYS EACH NOSTRIL ONCE A DAY, Disp: 48 g, Rfl: 3    isosorbide mononitrate (IMDUR) 30 MG 24 hr tablet, TAKE 1 TABLET BY MOUTH EVERY DAY. Hold if SBP <120, Disp: 90 tablet, Rfl: 3    levocetirizine (XYZAL) 5 MG tablet, TAKE 1 TABLET BY MOUTH EVERY DAY IN THE EVENING FOR ALLERGIES, Disp: 90 tablet, Rfl: 2    methocarbamoL (ROBAXIN) 500 MG Tab, Take 1 tablet (500 mg total) by mouth 2 (two) times daily as needed. *MAY CAUSE DROWSINESS, Disp: 20 tablet, Rfl: 0    metoprolol tartrate (LOPRESSOR) 25 MG tablet, Take 1 tablet (25 mg total) by mouth 2 (two) times daily. FOR BLOOD PRESSURE, Disp: 180 tablet, Rfl: 3    montelukast (SINGULAIR) 10 mg tablet, 1 tablet Orally Once a day for 30 days, Disp: , Rfl:     neomycin-polymyxin-hydrocortisone (CORTISPORIN) 3.5-10,000-1 mg/mL-unit/mL-% otic suspension, Place 3 drops into both ears 4 (four) times daily., Disp: 10 mL, Rfl: 1    nitroGLYCERIN (NITROSTAT) 0.4 MG SL tablet, PLACE 1 TAB UNDER TONGUE EVERY 5 MINUTES x 3 DOSES AS NEEDED FOR CHEST PAIN. IF NOT RESOLVED CALL 911, Disp: 25 tablet, Rfl: 11    pantoprazole (PROTONIX) 40 MG tablet, Take 1 tablet (40 mg total) by mouth once daily., Disp: 90 tablet, Rfl: 3    rosuvastatin (CRESTOR) 20 MG tablet, Take 1 tablet (20 mg total) by mouth once daily., Disp: 90 tablet, Rfl: 3    TRELEGY ELLIPTA 100-62.5-25 mcg DsDv, INHALE ONE PUFF INTO THE LUNGS ONCE A DAY, Disp: , Rfl: 5    REVIEW OF SYSTEMS:  ROS       VITALS:  Vitals:    11/13/24 0951   BP: 128/76   BP Location: Left arm   Patient Position: Sitting   Pulse: (!) 52   Weight: 70.9 kg (156 lb 6.7 oz)   Height: 5' 3" (1.6 m)        PHYSICAL EXAM:   Physical Exam   Palpable pedal pulses " "bilaterally      LAB RESULTS:  No results found for: "CBC"  Lab Results   Component Value Date    LABPROT 10.5 05/22/2024    INR 1.0 05/22/2024     Lab Results   Component Value Date     09/17/2024    K 3.6 09/17/2024     09/17/2024    CO2 24 09/17/2024     (H) 09/17/2024    BUN 14 09/17/2024    CREATININE 0.8 09/17/2024    CALCIUM 9.1 09/17/2024    ANIONGAP 8 09/17/2024    EGFRNONAA >60 07/19/2022     Lab Results   Component Value Date    WBC 5.26 09/17/2024    RBC 4.32 09/17/2024    HGB 12.8 09/17/2024    HCT 39.8 09/17/2024    MCV 92 09/17/2024    MCH 29.6 09/17/2024    MCHC 32.2 09/17/2024    RDW 13.2 09/17/2024     09/17/2024    MPV 11.5 09/17/2024    GRAN 3.4 09/17/2024    GRAN 64.4 09/17/2024    LYMPH 1.2 09/17/2024    LYMPH 22.2 09/17/2024    MONO 0.5 09/17/2024    MONO 9.3 09/17/2024    EOS 0.1 09/17/2024    BASO 0.05 09/17/2024    EOSINOPHIL 2.3 09/17/2024    BASOPHIL 1.0 09/17/2024    DIFFMETHOD Automated 09/17/2024     .  Lab Results   Component Value Date    HGBA1C 5.7 (H) 05/22/2024       IMAGING:  All pertinent imaging has been reviewed and interpreted independently.  I reviewed her CT scan    IMP/PLAN:  Ade Castellano is a 79 y.o. female with varicose veins.  The isolated incident of right foot pain does not appear to be vascular in origin.  It was certainly not consistent with arterial insufficiency.  It could be related to venous insufficiency but unknown at this time.    Plan:    Recommend compression stocking therapy   Return to clinic p.r.n..  If symptoms do return inpatient wishes to reschedule we will plan to evaluate with venous reflux study prior to appointment          I spent 40 minutes evaluating this patient and greater than 50% of the time was spent counseling, coordinator care and discussing the plan of care.  All questions were answered and patient stated understanding with agreement with the above treatment plan.    David Lathma MD, " PhD  Vascular and Endovascular Surgery

## 2024-11-19 DIAGNOSIS — J30.9 ALLERGIC SINUSITIS: ICD-10-CM

## 2024-11-19 RX ORDER — LEVOCETIRIZINE DIHYDROCHLORIDE 5 MG/1
TABLET, FILM COATED ORAL
Qty: 90 TABLET | Refills: 1 | Status: SHIPPED | OUTPATIENT
Start: 2024-11-19

## 2024-11-19 NOTE — TELEPHONE ENCOUNTER
Refill Decision Note   Ade Castellano  is requesting a refill authorization.  Brief Assessment and Rationale for Refill:  Approve     Medication Therapy Plan:         Comments:     Note composed:3:11 PM 11/19/2024

## 2024-11-19 NOTE — TELEPHONE ENCOUNTER
No care due was identified.  [facility] Embedded Care Due Messages. Reference number: [id number].   11/19/2024 1:49:06 PM CST

## 2024-11-20 ENCOUNTER — INFUSION (OUTPATIENT)
Dept: INFUSION THERAPY | Facility: HOSPITAL | Age: 79
End: 2024-11-20
Attending: INTERNAL MEDICINE
Payer: MEDICARE

## 2024-11-20 ENCOUNTER — OFFICE VISIT (OUTPATIENT)
Dept: HEMATOLOGY/ONCOLOGY | Facility: CLINIC | Age: 79
End: 2024-11-20
Payer: MEDICARE

## 2024-11-20 VITALS
DIASTOLIC BLOOD PRESSURE: 79 MMHG | HEART RATE: 50 BPM | SYSTOLIC BLOOD PRESSURE: 132 MMHG | TEMPERATURE: 98 F | OXYGEN SATURATION: 98 %

## 2024-11-20 VITALS
WEIGHT: 155.63 LBS | OXYGEN SATURATION: 97 % | SYSTOLIC BLOOD PRESSURE: 104 MMHG | BODY MASS INDEX: 27.57 KG/M2 | HEART RATE: 54 BPM | HEIGHT: 63 IN | DIASTOLIC BLOOD PRESSURE: 69 MMHG

## 2024-11-20 DIAGNOSIS — Z85.118 HISTORY OF LUNG CANCER: ICD-10-CM

## 2024-11-20 DIAGNOSIS — M79.2 PERIPHERAL NEUROPATHIC PAIN: ICD-10-CM

## 2024-11-20 DIAGNOSIS — J44.9 CHRONIC OBSTRUCTIVE PULMONARY DISEASE, UNSPECIFIED COPD TYPE: ICD-10-CM

## 2024-11-20 DIAGNOSIS — G61.82 MULTIFOCAL MOTOR NEUROPATHY: ICD-10-CM

## 2024-11-20 DIAGNOSIS — C34.90 SQUAMOUS CELL CARCINOMA LUNG: Primary | ICD-10-CM

## 2024-11-20 DIAGNOSIS — Z86.73 HISTORY OF TIA (TRANSIENT ISCHEMIC ATTACK): ICD-10-CM

## 2024-11-20 DIAGNOSIS — C50.919 RECURRENT BREAST CANCER, UNSPECIFIED LATERALITY: Primary | ICD-10-CM

## 2024-11-20 DIAGNOSIS — C50.919 RECURRENT BREAST CANCER, UNSPECIFIED LATERALITY: ICD-10-CM

## 2024-11-20 DIAGNOSIS — G60.9 HEREDITARY AND IDIOPATHIC NEUROPATHY, UNSPECIFIED: ICD-10-CM

## 2024-11-20 DIAGNOSIS — Z51.11 ENCOUNTER FOR ANTINEOPLASTIC CHEMOTHERAPY: ICD-10-CM

## 2024-11-20 DIAGNOSIS — E74.89 OTHER SPECIFIED DISORDERS OF CARBOHYDRATE METABOLISM: ICD-10-CM

## 2024-11-20 DIAGNOSIS — E83.42 HYPOMAGNESEMIA: ICD-10-CM

## 2024-11-20 LAB
ALBUMIN SERPL BCP-MCNC: 3.8 G/DL (ref 3.5–5.2)
ALBUMIN SERPL BCP-MCNC: 3.8 G/DL (ref 3.5–5.2)
ALP SERPL-CCNC: 50 U/L (ref 40–150)
ALP SERPL-CCNC: 50 U/L (ref 40–150)
ALT SERPL W/O P-5'-P-CCNC: 14 U/L (ref 10–44)
ALT SERPL W/O P-5'-P-CCNC: 14 U/L (ref 10–44)
ANION GAP SERPL CALC-SCNC: 10 MMOL/L (ref 8–16)
ANION GAP SERPL CALC-SCNC: 10 MMOL/L (ref 8–16)
AST SERPL-CCNC: 13 U/L (ref 10–40)
AST SERPL-CCNC: 13 U/L (ref 10–40)
BASOPHILS # BLD AUTO: 0.05 K/UL (ref 0–0.2)
BASOPHILS # BLD AUTO: 0.05 K/UL (ref 0–0.2)
BASOPHILS NFR BLD: 0.9 % (ref 0–1.9)
BASOPHILS NFR BLD: 0.9 % (ref 0–1.9)
BILIRUB SERPL-MCNC: 0.5 MG/DL (ref 0.1–1)
BILIRUB SERPL-MCNC: 0.5 MG/DL (ref 0.1–1)
BUN SERPL-MCNC: 20 MG/DL (ref 8–23)
BUN SERPL-MCNC: 20 MG/DL (ref 8–23)
CALCIUM SERPL-MCNC: 9.3 MG/DL (ref 8.7–10.5)
CALCIUM SERPL-MCNC: 9.3 MG/DL (ref 8.7–10.5)
CHLORIDE SERPL-SCNC: 105 MMOL/L (ref 95–110)
CHLORIDE SERPL-SCNC: 105 MMOL/L (ref 95–110)
CO2 SERPL-SCNC: 25 MMOL/L (ref 23–29)
CO2 SERPL-SCNC: 25 MMOL/L (ref 23–29)
CREAT SERPL-MCNC: 0.9 MG/DL (ref 0.5–1.4)
CREAT SERPL-MCNC: 0.9 MG/DL (ref 0.5–1.4)
DIFFERENTIAL METHOD BLD: NORMAL
DIFFERENTIAL METHOD BLD: NORMAL
EOSINOPHIL # BLD AUTO: 0.2 K/UL (ref 0–0.5)
EOSINOPHIL # BLD AUTO: 0.2 K/UL (ref 0–0.5)
EOSINOPHIL NFR BLD: 3 % (ref 0–8)
EOSINOPHIL NFR BLD: 3 % (ref 0–8)
ERYTHROCYTE [DISTWIDTH] IN BLOOD BY AUTOMATED COUNT: 13.2 % (ref 11.5–14.5)
ERYTHROCYTE [DISTWIDTH] IN BLOOD BY AUTOMATED COUNT: 13.2 % (ref 11.5–14.5)
EST. GFR  (NO RACE VARIABLE): >60 ML/MIN/1.73 M^2
EST. GFR  (NO RACE VARIABLE): >60 ML/MIN/1.73 M^2
ESTIMATED AVG GLUCOSE: 123 MG/DL (ref 68–131)
FOLATE SERPL-MCNC: 10.5 NG/ML (ref 4–24)
GLUCOSE SERPL-MCNC: 123 MG/DL (ref 70–110)
GLUCOSE SERPL-MCNC: 123 MG/DL (ref 70–110)
HBA1C MFR BLD: 5.9 % (ref 4–5.6)
HCT VFR BLD AUTO: 40.7 % (ref 37–48.5)
HCT VFR BLD AUTO: 40.7 % (ref 37–48.5)
HGB BLD-MCNC: 13.2 G/DL (ref 12–16)
HGB BLD-MCNC: 13.2 G/DL (ref 12–16)
IMM GRANULOCYTES # BLD AUTO: 0.03 K/UL (ref 0–0.04)
IMM GRANULOCYTES # BLD AUTO: 0.03 K/UL (ref 0–0.04)
IMM GRANULOCYTES NFR BLD AUTO: 0.5 % (ref 0–0.5)
IMM GRANULOCYTES NFR BLD AUTO: 0.5 % (ref 0–0.5)
LYMPHOCYTES # BLD AUTO: 1.3 K/UL (ref 1–4.8)
LYMPHOCYTES # BLD AUTO: 1.3 K/UL (ref 1–4.8)
LYMPHOCYTES NFR BLD: 23.5 % (ref 18–48)
LYMPHOCYTES NFR BLD: 23.5 % (ref 18–48)
MAGNESIUM SERPL-MCNC: 1.7 MG/DL (ref 1.6–2.6)
MAGNESIUM SERPL-MCNC: 1.7 MG/DL (ref 1.6–2.6)
MCH RBC QN AUTO: 30 PG (ref 27–31)
MCH RBC QN AUTO: 30 PG (ref 27–31)
MCHC RBC AUTO-ENTMCNC: 32.4 G/DL (ref 32–36)
MCHC RBC AUTO-ENTMCNC: 32.4 G/DL (ref 32–36)
MCV RBC AUTO: 93 FL (ref 82–98)
MCV RBC AUTO: 93 FL (ref 82–98)
MONOCYTES # BLD AUTO: 0.5 K/UL (ref 0.3–1)
MONOCYTES # BLD AUTO: 0.5 K/UL (ref 0.3–1)
MONOCYTES NFR BLD: 8.3 % (ref 4–15)
MONOCYTES NFR BLD: 8.3 % (ref 4–15)
NEUTROPHILS # BLD AUTO: 3.6 K/UL (ref 1.8–7.7)
NEUTROPHILS # BLD AUTO: 3.6 K/UL (ref 1.8–7.7)
NEUTROPHILS NFR BLD: 63.8 % (ref 38–73)
NEUTROPHILS NFR BLD: 63.8 % (ref 38–73)
NRBC BLD-RTO: 0 /100 WBC
NRBC BLD-RTO: 0 /100 WBC
PLATELET # BLD AUTO: 198 K/UL (ref 150–450)
PLATELET # BLD AUTO: 198 K/UL (ref 150–450)
PMV BLD AUTO: 10.3 FL (ref 9.2–12.9)
PMV BLD AUTO: 10.3 FL (ref 9.2–12.9)
POTASSIUM SERPL-SCNC: 3.9 MMOL/L (ref 3.5–5.1)
POTASSIUM SERPL-SCNC: 3.9 MMOL/L (ref 3.5–5.1)
PROT SERPL-MCNC: 6.6 G/DL (ref 6–8.4)
PROT SERPL-MCNC: 6.6 G/DL (ref 6–8.4)
RBC # BLD AUTO: 4.4 M/UL (ref 4–5.4)
RBC # BLD AUTO: 4.4 M/UL (ref 4–5.4)
SODIUM SERPL-SCNC: 140 MMOL/L (ref 136–145)
SODIUM SERPL-SCNC: 140 MMOL/L (ref 136–145)
TSH SERPL DL<=0.005 MIU/L-ACNC: 2.9 UIU/ML (ref 0.4–4)
VIT B12 SERPL-MCNC: 272 PG/ML (ref 210–950)
WBC # BLD AUTO: 5.67 K/UL (ref 3.9–12.7)
WBC # BLD AUTO: 5.67 K/UL (ref 3.9–12.7)

## 2024-11-20 PROCEDURE — 84443 ASSAY THYROID STIM HORMONE: CPT | Mod: HCNC | Performed by: NEUROLOGICAL SURGERY

## 2024-11-20 PROCEDURE — 99999 PR PBB SHADOW E&M-EST. PATIENT-LVL V: CPT | Mod: PBBFAC,HCNC,, | Performed by: INTERNAL MEDICINE

## 2024-11-20 PROCEDURE — 83921 ORGANIC ACID SINGLE QUANT: CPT | Mod: HCNC | Performed by: NEUROLOGICAL SURGERY

## 2024-11-20 PROCEDURE — 63600175 PHARM REV CODE 636 W HCPCS: Mod: HCNC | Performed by: INTERNAL MEDICINE

## 2024-11-20 PROCEDURE — 80053 COMPREHEN METABOLIC PANEL: CPT | Mod: HCNC | Performed by: INTERNAL MEDICINE

## 2024-11-20 PROCEDURE — 82746 ASSAY OF FOLIC ACID SERUM: CPT | Mod: HCNC | Performed by: NEUROLOGICAL SURGERY

## 2024-11-20 PROCEDURE — 82607 VITAMIN B-12: CPT | Mod: HCNC | Performed by: NEUROLOGICAL SURGERY

## 2024-11-20 PROCEDURE — 83735 ASSAY OF MAGNESIUM: CPT | Mod: HCNC | Performed by: INTERNAL MEDICINE

## 2024-11-20 PROCEDURE — 84165 PROTEIN E-PHORESIS SERUM: CPT | Mod: HCNC | Performed by: NEUROLOGICAL SURGERY

## 2024-11-20 PROCEDURE — 85025 COMPLETE CBC W/AUTO DIFF WBC: CPT | Mod: HCNC | Performed by: INTERNAL MEDICINE

## 2024-11-20 PROCEDURE — 84165 PROTEIN E-PHORESIS SERUM: CPT | Mod: 26,HCNC,, | Performed by: PATHOLOGY

## 2024-11-20 PROCEDURE — 36591 DRAW BLOOD OFF VENOUS DEVICE: CPT | Mod: HCNC

## 2024-11-20 PROCEDURE — 83036 HEMOGLOBIN GLYCOSYLATED A1C: CPT | Mod: HCNC | Performed by: NEUROLOGICAL SURGERY

## 2024-11-20 RX ORDER — HEPARIN 100 UNIT/ML
500 SYRINGE INTRAVENOUS
Status: DISCONTINUED | OUTPATIENT
Start: 2024-11-20 | End: 2024-11-20 | Stop reason: HOSPADM

## 2024-11-20 RX ADMIN — HEPARIN 500 UNITS: 100 SYRINGE at 08:11

## 2024-11-20 NOTE — PLAN OF CARE
Pt arrived to the Infusion unit for labs and port flush. Pt reports no complaints at this time. Pt received port flush. Port accessed without difficulty. Labs drawn. Heparin locked. Port deaccessed with no difficulty. Pt verbalizes no other needs at this time. Pt ambulates independently upon discharge. No acute distress noted.           Problem: Fall Injury Risk  Goal: Absence of Fall and Fall-Related Injury  Outcome: Progressing

## 2024-11-20 NOTE — Clinical Note
schedule next PAC flush on same day as visit in 2mos Cbc,cmp,  prior to f/u  2mos  Referral to pulmonology -establish care for COPD

## 2024-11-20 NOTE — PROGRESS NOTES
Subjective:       Patient ID: Ade Castellano is a 79 y.o. female.    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 The PET(+) disease in the right axilla and right supraclavicular fossa will be boosted at 200 cGy per fraction to 6000 cGy total dose.  S/p LYMPH NODE, RIGHT AXILLA, BIOPSY: 6/16/21 . Metastatic poorly-differentiated carcinoma, c/w breast primary ERnegPRneg Her2 neg  PD-L1 (22C3) IHC CPS> is greater than or equal to 10  Pembrolizumab 7/22/21 -6/15/22         Prior Hx:  80 y/o female with history of  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  And Rt  breast Haja/p  cycle 6 of carboplatin/Abraxane 7/2/2018   She has  History of Stage 1A  Rt breast cancer Grade 3, ER/RI neg , Her 2 jose maria neg s/p lumpectomy 7/11/2014 and s/p adjuvant RT 9/2014 Pt declined Adjuvant chemo.Pathology showed a 1.15 cm, grade 3 infiltrating ductal carcinoma.  One sentinel lymph node was negative for malignancy.  Margins were negative and the closest margin was 1 cm.  She was staged  pT1c  pN0 cM0 stage IA.  She declined adjuvant chemo therapy.  She completed post operative radiation therapy at 400 cGy per fraction to 4000 cGy 9/2014  Pt hospitalized 11/2017 for  acute on chronic respiratory failure requiring intubation and ventilation. Has large lung mass in right lung with probable post obstructive pneumonia resulting in COPD exacerbation.CTA  chest 11/29/2017 revealed   Large right hilar mass with involvement of adjacent segmental pulmonary arterial branches and bronchi with associated postobstructive atelectasis and volume loss in the RML  with adjacent ground glass opacity concerning for underlying neoplastic process. Mediastinal and axillary adenopathy, with a right axillary lymph node measuring up to 2.0 cm in short axis diameter.She underwent rt axillary LN bx at outside facility- on 1/16/2018 at [facility]. Pathology revealed metastatic poorly differentiated carcinoma of unknown primary site. She next underwent lung bx at [facility] 1/31/2018  benign lung tissue. Repeat Right Lung Bx 2/14/2018 Pathology reveals Squamous cell carcinoma PD-L1 10% low expression EGFR NEG ALK NEG BRAF NEG. Outside slides axillary LN specimen were reviewed/comparison to lung bx findings . She completed    cycle 6 of carboplatin/Abraxane completed 7/2018 .PET/CT  4/19/2018 revealed there has been a excellent response to therapy.  At least 90% reduction in malignant activity.PET/CT 2/21/2019 increased size and irregularity of the right axillary lymph node with increased FDG avidityMAMMO/US rt breast 2/2019 revealed suspicious findings rt axilla LN.  Right axilla, mass, core biopsy: Positive for poorly differentiated carcinoma breast primary ER neg/GA neg Her2 neg.[facility]ides [facility] for outside reviewIt was determined  the lung tumor corresponds to a squamous cellcarcinoma and appears to be unrelated to the invasive ductal carcinoma of the breast. The axillary mass, in myopinion, corresponds to metastases of the breast primary. Although it is a poorly differentiated carcinoma, theimmunophenotype is most consistent with the one that the breast primary exhibited, and is inconsistent with metastatic squamous cell carcinoma. She was treated with  AC ( adriamycin 60m/m2/Cytoxan 600mg/m2)  q21d x 4 cycles completed 9/6/2019 . PET/CT 9/19/2019 shows disease response l decrease in  size and uptake of several right axillary lymph nodes and a supraclavicular lymph node.  Mild residual activity may indicate viable tumor.Pt followed by Rad/Onc. She was presented at [facility] tumor board and it was determined to proceed Radiation therapy only. No ALND due to concurrent lung and supraclavicular node. She  completed  RT 12/16/2019  To right axilla and right supraclavicular region PET/CT imaging  5/20/21 shows Continued increase in both size and hypermetabolic activity of right axillary level 1 lymph nodes a new, mildly hypermetabolic cutaneous thickening of the right breast. she underwent LYMPH NODE, RIGHT AXILLA, BIOPSY: 6/16/21 . Pathology showed Metastatic poorly-differentiated carcinoma, consistent with breast primary-ERneg/ PRneg  HER2  neg  Pt started on pembrolizumab 7/2021  Pt hospitalized 4/6/22 with hypokalemia and orthostatic hypotension  S/p C16 pembrolizumab 6/15/22  ( 400mg q6wks)  hospitalized with  dizziness and possible TIA   Patients etiology appears to be secondary to small vessel disease. Recommendations were to continue Aspirin 81 mg daily, plavix 75 mg for 21 days followed by ASA 81 mg monotherapy thereafter.     Interval Hx: Followed by Vascular surgery   She was recently prescribed neurontin with improvement in pain in feet  She continues with mild LOGAN-improved   She is f/b pulmonology, Dr. Katz and elects to transfer care to Pulmonology 2/2 transportation issues.   She continues with chronic LBP , stable  Appetite and weight stable  She has supp 02 at home  Cont to wear 02 at night               PrevHx:She had an abnormal mammogram 6/4/2014 whichRevealed a round solid mass 6mm in rt breast.She then underwent U/S guided core bx of rt breast mass on 6/17/2014.Pathology revealed infiltrating ductal carcinoma Grade 3 with tumorPresent in thin-walled spaces suggestive of lymphatic spaces. HormoneReceptor status on tumor specimen revealed ER negative 0% ,MT negative 0% and  "Her 2 jose maria negative.She subsequently underwent rt segmental mastectomy and SLN bxOn 7/11/2014. Pathology of rt breast lumpectomy revealed invasiveDuctal carcinoma with micropapillary pattern ( Invasive micropapillaryCa) with max tumor dimension 11.5mm with suggestion of tumor in Thin walled spaces c/w lymphovascular involvement. SurgicalMargins free of tumor, grade 3, ER/UT neg , Her 2 jose maria neg With [place] Lymph node negative for Neoplasm. Pathologic staging [id number](i-)Her mother was diagnosed with breast cancer in her 50's/        Review of Systems   Constitutional: Positive for mild  fatigue, stable No  activity change,  fever.   HENT:  Negative for mouth sores, rhinorrhea.  Eyes: Negative for visual disturbance.   Respiratory: Positive for chronic  LOGAN,improved , Negative for wheezing.    Cardiovascular: Negative for chest pain.   Gastrointestinal:  Negative for abdominal pain and diarrhea.   Genitourinary: Negative for frequency.   Musculoskeletal: Positive for chronic LBP, stable   Skin: Negative for rash.   Neurological: Negative for dizziness and headaches.   Hematological: Negative for adenopathy.   Psychiatric/Behavioral: The patient is not nervous/anxious.        Objective:        Vitals:    11/20/24 1038   BP: 104/69   BP Location: Left arm   Patient Position: Sitting   Pulse: (!) 54   SpO2: 97%   Weight: 70.6 kg (155 lb 10.3 oz)   Height: 5' 3" (1.6 m)       Physical Exam   Constitutional: She is oriented to person, place, and time. She appears ill, coughing episodes  HENT:   Head: Normocephalic.   Eyes: Conjunctivae and lids are normal.No scleral icterus.   Neck: Normal range of motion. Neck supple. No thyromegaly present.   Cardiovascular: Normal rate, regular rhythm and normal heart sounds.   murmur heard.  Pulmonary/Chest: Breath sounds normal. She has no wheezes. She has no rales.   Abdominal: Soft. Bowel sounds are normal.  There is no tenderness. There is no rebound and no guarding.   Left breast- " no masses or axillary LAD noted  Right breast- breast thickening inner quad    She has no cervical adenopathy.     She has rt axillary adenopathy.        Right: No supraclavicular adenopathy present.        Left: No supraclavicular adenopathy present.   Neurological: She is alert and oriented to person, place, and time. No cranial nerve deficit. Coordination normal.   Skin: Skin is warm and dry. No ecchymosis, no petechiae and no rash noted. No erythema.   Psychiatric: She has a normal mood and affect.             CT a/p w/contrast 11/29/2018   1. No acute abnormality identified within the abdomen and pelvis.    2.  There are a few nonspecific prominent lymph nodes in the upper abdomen including a periesophageal lymph node which measures 1.0 cm in short axis diameter.    3.  Significant abdominal aortic atherosclerosis and abdominal aorta ectasia.    4.  Additional findings as above.    PET/CT 1/26/2018   1.  Intense FDG uptake within the known right infrahilar lung mass, compatible with malignancy.  It is unclear if this represents primary lung malignancy or metastatic breast cancer.    2.  Intensely hypermetabolic right axillary, retropectoral, and lower right cervical lymph nodes, compatible with metastases.    3.  Abnormal FDG uptake along right breast skin thickening, new from prior chest CTA and mammogram.  This may relate to localized edema/inflammation, though correlation with physical exam and mammography are recommended to exclude underlying inflammatory carcinoma.      MRI Brain w w/out contrast 2/9/2018  1.  No evidence of intracranial metastases.  2.  Sinus disease       Rt axillary LN bx at outside facility- on 1/16/2018 at [facility]  Pathology revealed metastatic poorly differentiated carcinoma of unknown primary  site 1/16/2018 at [facility]  TTF-1 negative, napsin negative  , cytokeratin 7 positive, cytokeratin 20 negative, p63 negative, cytokeratin 5/6 focal dim  positivity    LUNG BIOPSY 1/31/2018  Pathology revealed benign lung tissue         SPECIMEN  1) Right Lung Bx 2/14/2018  Supplemental Diagnosis  Immunohistochemical stains show strong nuclear staining for p63 in essentially all tumor cells and very strong  cytoplasmic and membrane staining for CK5/6, also in essentially all tumor cells. TTF-1 and CK7 are negative  within tumor cells but do stain native pulmonary elements present within the biopsy. A stain for mucicarmine is  negative. Positive and negative controls function appropriately.  Final diagnosis: Specimen submitted as right lung biopsy  -Squamous cell carcinoma  (Electronically Signed: 2018-02-20 09:12:07 )  Diagnosed by: Phong Thompson  FINAL PATHOLOGIC DIAGNOSIS  Fragments of pulmonary parenchyma (submitted as right lung biopsy):    FINAL PATHOLOGIC DIAGNOSIS 1. Lymph node, right axilla, biopsy, review of 10 outside slides [facility], JS [phone],  collected January 11, 2018: Metastatic poorly differentiated carcinoma (see comment).  The histologic section shows fibrous stroma infiltrated by nest of poorly differentiated malignant cells including  occasional dyskeratotic cells morphologically suggestive of metastatic squamous cell carcinoma.  Immunohistochemical stains are nonspecific; the cells are positive for cytokeratin 7 and cytokeratin 5/6 (focal) and  negative for cytokeratin 20, TTF-1, p63, GCDFP, mammoglobin, and CEA        PET/CT 2/21/2019  Increased size and irregularity of the right axillary lymph node with increased FDG avidity.  Although this would be atypical in location for lung carcinoma, the increased size and avidity must be concerning for metastatic disease in this patient with history of squamous cell lung cancer.     US Rt breast and mammo 2/26/2019  Impression:  Right  Lymph Node: Right axilla lymph node. Assessment: 4 - Suspicious finding. Biopsy is recommended.      BI-RADS Category:   Right: 4 -  Suspicious  Overall: 4 - Suspicious     Recommendation:  Biopsy is recommended. Biopsy of the lymph node measuring 1.4 x 1.1 x 1.3 recommended , the one with the round shape configuration, corresponding to the most recent PET CT finding.         Pathology 3/11/2019   FINAL PATHOLOGIC DIAGNOSIS  Right axilla, mass, core biopsy:  Positive for poorly differentiated carcinoma.  See comment.  Comment:  The biopsy from the right axilla mass shows a poorly differentiated carcinoma in background lymphoid tissue  with enlarged cells showing increased nuclear size, prominent nucleoli, moderate amounts of cytoplasm and mitotic  figures. Immunostains are completed and reveal the tumor cells to stain positively with cytokeratin AE 1/AE3, CK  5/6 and CK 7. The tumor cells are negative for CK 20, Estrogen receptor, p63 and TTF-1. All stains have  satisfactory positive and negative controls. The patient's prior cases will be reviewed and an additional stain for  JUAREZ-3 is pending in an attempt to pinpoint the primary site of this malignancy and results will follow in a  supplemental report.      Supplemental Diagnosis  3/11/2019  The current axillary mass is compared to the patient's prior right lung biopsy (case number [id number]) and the  current axillary mass is morphologically similar to the lung malignancy. Also, the current axillary mass is compared  to the patient's prior breast resection (Case number [id number]) and appears similar as well. P63 is negative in the  [id number] tumor and CK 5/6 shows scattered positive staining in the [id number] tumor.  Immunostain for JUAREZ-3 is completed and shows only rare weak to moderate staining within the tumor cell nuclei  with satisfactory positive and negative controls. This stain is positive in the surrounding lymphocytes. This staining  pattern is non-specific and does not definitively differentiate between a lung and breast primary malignancy in this  right axilla mass biopsy. The  staining profile of the current axillary mass match more closely with the previous  breast carcinoma (due to the p63 negativity in both the 2014 breast cancer and the current axillary mass lesion but  p63 positivity in the lung mass from 2018).  This staining profile, together with the comparison with the patient's prior breast tumor and prior lung tumor supports  a diagnosis of poorly differentiated carcinoma and the malignancy appears morphologically similar to the prior  malignancies from both sites, but the staining profile in this small sample from the axillary mass more closely  correlates with the prior breast malignancy. Pathologic subclassification of this malignancy's primary location is not  definitive and clinical correlation is recommended definitively decide on possible primary location in this patient's  axillary mass sample.  Supplemental (2):  Additional immunohistochemical staining for progesterone receptor and HER2 are completed per clinician request  with following results:  Progesterone receptor: Negative; 0% nuclear tumor cell staining.  HER2: Negative; stain score = 0.  Supplemental (3):  [facility] DIAGNOSIS:  FINAL DIAGNOSIS  Breast, right, needle core biopsy ([id number]; 06/17/2014): Invasive ductal carcinoma, [place] grade III (of  III), with focal micropapillary features.  Immunohistochemical stains performed at the referring institution show that the tumor cells have the following  phenotype: - Estrogen receptor: Negative (0% tumor cells staining). - Progesterone receptor: Negative (0% tumor  cells staining). - HER2: Negative (score 0).  Lymph nodes, right, sentinel biopsy and lumpectomy ([id number]; 07/11/2014):  1. Right sentinel lymph node: A single (1) lymph node is negative for metastatic carcinoma.  Immunohistochemical stains performed by the referring institution and reviewed at [facility] for cytokeratin are  negative, confirming the diagnosis.  2. Right breast: Invasive ductal  carcinoma with micropapillary features, per report 11.5 mm in greatest dimension.  Foci suspicious for lymphovascular invasion identified. Margins of resection are free of tumor.  Immunohistochemical stains performed by the referring institution and reviewed at [facility] show that the tumor  cells are negative for p63 and show patchy positivity for CK 5/6.  Lung, right, needle core biopsy ([id number]; 02/14/2018): Squamous cell carcinoma.    Immunohistochemical stains performed by the referring institution show the tumor cells are strongly positive for p63  and CK 5/6, while negative for TTF-1 and CK7. These result support the diagnosis. Axilla, right, needle core biopsy  ([id number]; 03/11/2019): Lymph node positive for metastatic carcinoma, most consistent with breast primary  (see comment).  Immunohistochemical stains performed by the referring institution and reviewed at [facility] show that the tumor  cells are negative for p63, focally positive for CK 5/6, focally and weakly positive for JUAREZ-3, and strongly positive  for CK7. They are also negative for estrogen receptor, progesterone receptor, and HER2 JAM (score 0).  COMMENT:  Thank you for allowing me to review the case of this 73-year-old lady who recently underwent needle core biopsies  of a right axillary mass and who has a previous diagnosis of an invasive ductal carcinoma of the right breast, as well  as a squamous cell carcinoma of the lung. I am reviewing this case because of my special interest in breast  pathology.  I agree with your interpretation of this case. In my opinion, the lung tumor corresponds to a squamous cell  carcinoma and appears to be unrelated to the invasive ductal carcinoma of the breast. The axillary mass, in my  opinion, corresponds to metastases of the breast primary. Although it is a poorly differentiated carcinoma, the  immunophenotype is most consistent with the one that the breast primary exhibited, and is inconsistent  with a  metastatic squamous cell carcinoma. I did share the case with Dr. Anabel Stone, one of our pulmonary pathologists,  and she concurs with this interpretation.  Note: Report attached.  Performing location:  [address] [address]      LYMPH NODE, RIGHT AXILLA, BIOPSY: 6/16/21   - Metastatic poorly-differentiated carcinoma, consistent with breast primary  -ER, LA, and HER2 immunohistochemical hormonal markers are also negative  PD-L1 (22C3) SemiQuant IHC, Manual  Interpretation  Right axillary lymph node , specimen for PD-L1 immunohistochemistry studies (clone 22C3, [employer], [place], [place]; using a proprietary detection system) ([id number]):  Reported carcinoma site of origin: Breast  Result: The percent of PD-L1 positive cells based upon the total number of tumor cells (combined positive  score, CPS) is greater than or equal to 10.  Interpretation: Studies suggest that positive PD-L1 immunohistochemistry in tumor cells and/or tumorassociated  immune cells may predict tumor response to therapy with immune checkpoint inhibitors. This  result should not be used as the sole factor in determining treatment, as other factors (for example, tumor  mutation burden, and microsatellite instability) have been also studied as predictive markers.          CT neck/chest/abd/pelvis w/contrast 4/26/2019   The previously described right hilar mass is no longer visible.  There is persistent volume loss in the right middle lobe this appears similar to the prior PET-CT February 21, 2019.    Persistent abnormal right axillary node today measuring about 15 mm compared 18 mm prior.  The positioning of the adjacent nodes is slightly different likely accounting for the slight difference in measurement.    Cluster of tree-in-bud nodular densities left lower lobe series 2, image 93.  This may be related to small airways disease though serial follow-up  suggested.      PET/CT 6/20/2019   New and worsening hypermetabolic lymph nodes concerning for metastatic breast cancer as described above.  Tissue sampling would be required for definitive diagnosis.      2-D echo 6/27/2019   Normal left ventricular systolic function. The estimated ejection fraction is 55%  Concentric left ventricular remodeling.  Normal LV diastolic function.  Normal right ventricular systolic function.  Moderate left atrial enlargement.  Mild right atrial enlargement.  Mild aortic regurgitation.  Mild mitral regurgitation.  Mild tricuspid regurgitation.  Normal central venous pressure (3 mm Hg).  The estimated PA systolic pressure is 25 mm Hg      PET/CT 9/19/2019   Favorable response to therapy with interval decrease in size and uptake of several right axillary lymph nodes and a supraclavicular lymph node.  Mild residual activity may indicate viable tumor.    No new hypermetabolic findings worrisome for malignancy.    PET/CT 2/28/2020  1.  No evidence of hypermetabolic tumor.  Continued improvement of the right axilla status post adjuvant radiation.    2.  No new hypermetabolic lesions      CTA 6/17/2020  1. No evidence of pulmonary embolus. No aortic dissection.   2. There is no evidence for new or progressive disease in the chest as compared to PET/CT 6/20/2019 in this patient with history of right breast cancer and right lung neoplasm. The patient does have a more recent PET/CT 2/28/2020 from an outside facility per the electronic medical record although images are not available for direct comparison. Correlation with these images may be beneficial. Continued long-term surveillance recommended.  3. Stable appearance of the treated tumor in the right hilum/middle lobe.  4. Stable treated right breast cancer and axillary adenopathy without evidence for developing breast mass or new right axillary adenopathy.  5. Stable tiny nodularity/tree-in-bud densities in the left lung, probably  postinfectious or inflammatory.  6. Wall thickening of the esophagus may be correlated for esophagitis. Possible tiny hiatus hernia.  7. Slight bronchial wall thickening may be correlated for bronchitis.  8. Mild to moderate emphysema as before.  9. Reflux of contrast into the IVC and hepatic veins is nonspecific but may be correlated for elevated right heart pressures.  10. Coronary calcifications and/or stents similar to prior.    Echo 5/21/2020  Technically difficult study.   Normal left ventricular systolic function.   Left ventricular ejection fraction is estimated at 55%.   Severe mitral annular calcification.   Mild mitral valve regurgitation.   Aortic valve sclerosis without stenosis or regurgitation.     PFTs 6/17/2020  Spirometry reveals a very severe obstructive lung defect, which does not significantly improve post bronchodilators. Lung volumes revealed air trapping. Diffusing capacity was moderately impaired.        Del 6/26/2020  BI-RADS Category:   Overall: 2 - Benign      PET/CT 10/23/2020   Impression:     Stable mildly hypermetabolic right axillary lymph node/nodular density contralateral to the site of injection.  Differential considerations include metastatic lymph node, reactive lymph node from benign right upper extremity process, and fat necrosis.  Recommend physical examination of the upper extremity and consideration of ultrasound for further evaluation of the node/nodule and possible image guided biopsy.  Otherwise, attention on follow-up.     Otherwise, no other hypermetabolic disease identified      Mammo diagnostic right /US 11/4/2020   Impression:   1. No suspicious finding either on mammogram or ultrasound at the site of the palpable lump in the right breast at 10:00 o'clock. A bilateral mammogram is recommended in June 2020 to return to the patient to annual screening.   2. Redemonstration of the morphologically abnormal lymph node in the right axilla that contains a biopsy clip,  compatible with the known recurrence metastatic breast cancer that has been treated with chemotherapy. The appearance of this lymph node is unchanged from 06/26/2020 and overall appears smaller when compared to 03/11/2019.     Abd US 12/11/2020   Impression:     1. Echogenic liver likely representing hepatic steatosis.  2. Right upper quadrant ultrasound otherwise is unremarkable.     PET/CT 10/14/21     Impression:     1.  No definite evidence of hypermetabolic tumor.  Interval resolution of hypermetabolic right axillary lymph nodes.  No new hypermetabolic findings.     2.  Persistent low-grade diffuse cutaneous uptake which is nonspecific.    MRI shoulder w w/out contrast 1/26/22   Impression:     Mild edema and postcontrast enhancement at the myotendinous junction of the supraspinatus and infraspinatus, it is nonspecific and could be due to degenerative change or tendinosis.     Small area of interstitial tear at the footprint insertion of the infraspinatus tendon.     No large rotator cuff tear.     No advanced degenerative change.     No osseous lesions.     PET/CT 1/26/22  Impression:In this patient with breast cancer, there is no evidence of hypermetabolic tumor to suggest recurrent or metastatic disease.   Less extensive but, nonspecific, low-grade diffuse cutaneous uptake of the right breast.       PET/CT 4/27/22  Impression:     1.  No FDG avid disease to suggest recurrence or metastatic disease.     Unchanged right breast skin thickening with mild FDG uptake.       PET/CT 7/13/22   Impression:     In this patient with lung and breast cancer, there is new left lower lobe opacification with mild radiotracer uptake which may represent aspiration, pneumonia, or malignancy.  Tissue sampling would be required for definitive diagnosis.     Unchanged right breast skin thickening with mild radiotracer uptake.    CT chest w/contrast 8/25/22    Decreasing patchy pulmonary consolidation within the left lower lobe  when compared to prior PET-CT scan dated 07/13/2022.  The overall appearance of the patchy consolidation as well as the moderate improvement when compared to the prior study suggest a benign etiology such as left lower lobe pneumonia.     Persistent band of atelectasis/scarring within the right middle lobe, stable dating back to 04/26/2019.     Coronary artery atherosclerosis in a multi vessel distribution.     There are no measurable lesions per RECIST criteria.    PET /CT  11/21/22 shows New right lower lobe infiltrate worrisome for pneumonia or aspiration.Chronic infiltrate right middle lobe.   Resolution of the left lower lobe infiltrate.    Mammo 7/26/22  BI-RADS Category:   Overall: 2 - Benign        CT chest with contrast 11/21/22    Impression:     New right lower lobe infiltrate worrisome for pneumonia or aspiration.     Chronic infiltrate right middle lobe.     Resolution of the left lower lobe infiltrate.     Coronary artery calcifications.         PET/CT 1/11/23  Impression:     1. In this patient with lung and breast cancer, there is stable right breast skin thickening with mild radiotracer uptake.  2. Interval resolution of hypermetabolic opacification in the left lower lobe as compared to FDG PET-CT 07/14/2022.  Interval improvement in patchy opacities in the right lower lobe as compared to CT 11/21/2022    .                Electronically Signed By: Tapan Young MD 4/16/2023 23:17 CDT, [facility]  Narrative    PET/CT 4/14/23  [facility]  HISTORY:       Malignant neoplasm of female breast, unspecified estrogen receptor status, unspecified laterality, unspecified site of breast (CMS/HCC).       ICD10:  C50.919   Malignant neoplasm of female breast, unspecified estrogen receptor status, unspecified laterality, unspecified site of breast (CMS/HCC)       REFERENCE EXAMS:     7/13/2022 PET CT ([facility]) (no images, report only)     6/20/2019 PET CT ([facility])       TECHNIQUE:      PET/CT with attenuation correction.     Dose:  13.62 mCi of FDG-18     Injection site:  left wrist     Field of view:  Skull base to thighs.     Arm position:  above head     Baseline glucose:  128 mg/dL       FINDINGS:       All SUV values are max SUV.     Head/Neck:     Physiologic uptake of radiotracer.         Thorax:     Right portacath.       Cutaneous thickening in the right breast (SUV=1.58).       Calcification of the mitral valve, similar to 6/20/2019.       Coronary artery atherosclerotic calcification.     Atelectasis/scarring along the inferior aspect of the right major fissure, similar to 6/20/2019.         Abdomen/Pelvis:     Physiologic uptake in the genitourinary system.     Physiologic uptake in the intestines.       Patchy atherosclerotic calcification in the aorta and iliac arteries.       Impression      No opacity in the left lower lobe on the current exam to correspond to a left lower lobe opacity described on the 7/13/2022 PET CT report.  Images from the 7/13/2022 PET CT are not available at the time of this report.       Cutaneous thickening in the right breast with mild uptake.  There was a similar finding described on the 71/3/2022 PET CT report.           Electronically Signed By: Tapan Young MD 4/16/2023 23:17 CDT, [facility]          Mammo screening bilateral 8/7/23  BI-RADS Category: Overall: 2 - Benign     PET /CT ( NOT DOTATATE) 4/14/23 No opacity in the left lower lobe on the current exam to correspond to a left lower lobe opacity described on the 7/13/2022 PET CT report.  Images from the 7/13/2022 PET CT are not available at the time of this report.   Cutaneous thickening in the right breast with mild uptake.  There was a similar finding described on the 71/3/2022 PET CT report.       PET/CT 11/14/23    Impression:     Similar appearing right breast skin thickening with mild hypermetabolic activity.  No new focal abnormal tracer uptake  elsewhere.      EXAMINATION: 3/19/24   NM PET CT FDG SKULL BASE TO MID THIGH     CLINICAL HISTORY:  Breast cancer, invasive, stage IV, assess treatment response; Malignant neoplasm of unspecified site of unspecified female breast     TECHNIQUE:  12.8 mCi of F18-FDG was administered intravenously in the left antecubital fossa.  After an approximately 60 min distribution time, PET/CT images were acquired from the skull base to mid thigh.  Transmission images were acquired to correct for attenuation using a whole body low-dose CT scan without IV contrast with the arms positioned above the head. Glycemia at the time of injection was 116 mg/dL.     COMPARISON:  NM PET-CT 11/14/2023, 01/11/2023     FINDINGS:  Quality of the study: Adequate.     In the head and neck, there are no hypermetabolic lesions worrisome for malignancy. There are no hypermetabolic mucosal lesions, and there are no pathologically enlarged or hypermetabolic lymph nodes.  Prominent scattered physiologic muscular activity.     In the chest, there is similar appearing mild right breast skin thickening with decreased hypermetabolic activity now measuring 2.3 (image 103), previously SUV max 3.2.     There is a 3 mm left axillary lymph node with mild uptake, SUV max 2.6 (axial image 56), favored to represent reactive etiology.  Attention on follow-up.     There are no concerning pulmonary nodules or masses, and there are no pathologically enlarged or hypermetabolic lymph nodes.  Prominent physiologic tracer activity throughout thoracic musculature.     In the abdomen and pelvis, there is physiologic tracer distribution within the abdominal organs and excretion into the genitourinary system.  Similar mildly hypermetabolic focus in the 1st portion of the duodenum without CT correlate measuring SUV max 5.5 (image 134), possibly physiologic although nonspecific.     In the bones, there are no hypermetabolic lesions worrisome for malignancy.  Prominent focus of  hypermetabolic uptake in the left piriformis without underlying CT correlate (image 220), presumably physiologic.     Additional findings: Right chest wall implanted tunnel central venous catheter tip terminating in the superior vena cava.  Stable punctate pulmonary micronodule in the left upper lobe (axial series 3, image 73).  Coronary arterial and valvular calcific plaque.  Advanced aortoiliac calcific plaque.     Impression:     Similar appearing mild right breast skin thickening with decreased hypermetabolic activity. No new abnormal tracer uptake elsewhere worrisome for malignancy.     Additional findings as above.    CT c/a/p 5/3/24   Impression:     No acute abdominopelvic abnormality.  Specifically, no evidence for acute pancreatitis as clinically questioned.     Vague area of peripheral ground-glass with grouped micro-nodules at the peripheral right lung base with appearance suggesting sequela of infectious or non-infectious bronchiolitis.     Similar degree of asymmetric skin thickening at the right breast.  To correlate with mammographic and oncologic history.     Ectatic infrarenal abdominal aorta and additional findings as above.       Assessment:       1. Recurrent breast cancer, unspecified laterality    2. History of lung cancer    3. Chronic obstructive pulmonary disease, unspecified COPD type    4. History of TIA (transient ischemic attack)            Plan:     1.,2.   Pt with hx of Stage 1A invasive ductal carcinoma rt breast s/p rt segmental mastectomy and SLN bx 7/11/2014 ER/NH neg HER 2 jose maria neg [id number](i-).  S/p adjuvant RT completed 9/2014 Pt declined adjuvant chemo  Pt  hospitalized 11/2017 with acute-on-chronic  resp failure  Abnormal CT imaging revealing Large right hilar mass with involvement of  adjacent segmental pulmonary arterial branches and bronchi with associated postobstructive atelectasis  and involvement of mediastinal and axillary adenopathy   S/p rt axillary LN bx ( outside  facility) - metastatic poorly differentiated carcinoma of unknown primary  site  status post  lung biopsy 1/31/2017 -benign lung tissue  PET/CT 1/26/2018   Intense FDG uptake within the known right infrahilar lung mass, compatible with malignancy.   Intensely hypermetabolic right axillary, retropectoral, and lower right cervical lymph nodes, compatible with metastases.  Abnormal FDG uptake along right breast skin thickening, new from prior chest CTA and mammogram.  PET/CT 11/14/23 Similar appearing right breast skin thickening with mild hypermetabolic activity.  No new focal abnormal tracer uptake elsewhere.  Repeat lung bx 2/14/2018 revealed Squamous Cell CA lung PD-L1 10% EGFR NEGALK NEG  Pt treated for advanced squamous cell CA of lung She s/p  cycle 6 of carboplatin/Abraxane Day1 completed 7/2/2018 ( day 15 held due to prolonged cytopenias)  She completed therapy with near CR     PET/CT 2/21/2019 showed increased size and irregularity of the right axillary lymph node with increased FDG avidity     MAMMO/US rt breast 3/2019  reveals suspicious findings rt axilla LN.  Biopsy of the lymph node measuring 1.4 x 1.1 x 1.3     Pt s/p  rt axillary LN bxPositive for poorly differentiated carcinoma.- likely breast primary ERneg PRneg Her 2 neg    Outside review of specimen(s) revealed  lung tumor corresponds to a squamous cell carcinoma and appears to be unrelated to the invasive ductal carcinoma of the breast. It was determined The axillary mass, in my opinion, corresponded  to metastases of the breast primary. Although it is a poorly differentiated carcinoma, theimmunophenotype is most consistent with the one that the breast primary exhibited, and is inconsistent with a metastatic squamous cell carcinoma.     CT imaging 4/26/2019 persistent rt axillary LAD, no other sites of disease     Lymph node biopsy 3/11/2019 Right axilla, mass, core biopsy:Positive for poorly differentiated carcinoma.favor breast primary      PET/CT 6/20/2019  New and worsening hypermetabolic lymph nodes concerning for metastatic breast cancer     Previously Discussed  imaging findings in detail with patient which reveals new and worsening hypermetabolic melena metabolic lymph nodes including hypermetabolic supraclavicular node.  Therefore, at treatment option will be systemic chemotherapy in light of the recent imaging findings.  She will be followed by her surgical oncologist Dr. Christopher      IT was determined to proceed with systemic chemotherapy   S/p  AC [id number] x 3 wks x 4 wks completed 9/6/2019   ( pt has not received in past)      PET/CT 9/19/2019 shows disease response with interval decrease in size and uptake of several right axillary lymph nodes and a supraclavicular lymph node.  Mild residual activity may indicate viable tumor.No new hypermetabolic findings worrisome for malignancy.    Pt followed by  Breast Surgery and plan was  for possible   ALND. Pt with Recurrent cancer in her right axilla and right supraclavicular fossa.   She completed chemotherapy 9/6/2019 with near CR  It was determined  Radiation will be given to the original areas of hypermetabolic activity in the right axilla and right supraclavicular region    Pt completed  RT 12/16/2019     PET/CT 1/14/21 shows   New right axillary hypermetabolic lymph nodes with preserved normal architecture suggestive of reactive nodes.  Stable appearing previously identified hypermetabolic lymph node with mild increase in surrounding fat stranding.        Findings previously d/w with treating breast surgeon and it was determined to cont to monitor and close f/u as pt is heavily pre treated, has received RT to site   Pt acknowledged understanding and agreeable with plan     PET/CT imaging  5/20/21 shows Continued increase in both size and hypermetabolic activity of right axillary level 1 lymph nodes a new, mildly hypermetabolic cutaneous thickening of the right breast.     Right breast,  skin, punch biopsy:Negative for carcinoma    LYMPH NODE, RIGHT AXILLA, BIOPSY: 6/16/21   - Metastatic poorly-differentiated carcinoma, consistent with breast primary triple neg  PD-L1 immunohistochemistry studies(combined positive score, CPS) is greater than or equal to 10   PET/CT showed  No definite evidence of hypermetabolic tumor.  Interval resolution of hypermetabolic right axillary lymph nodes.  No new hypermetabolic findings.      S/p C16 pembrolizumab 6/15/22   Last  PET/CT imaging shows  new left lower lobe opacification with mild radiotracer uptake which may represent aspiration, pneumonia, or malignancy.  Recent follow-up imaging studies decreasing patchy pulmonary consolidation within the left lower lobe when compared to prior PET-CT scan dated 07/13/2022.  Plan to remain off of therapy   Follow-up PET/CT imaging 1/11/23 Interval resolution of hypermetabolic opacification in the left lower lobe as compared to FDG PET-CT 07/14/2022.  Interval improvement in patchy opacities in the right lower lobe as compared to CT 11/21/2022   follow up PET imaging [id number] -  performed at [facility] .([email or web address] was not available at  since broken )No opacity in the left lower lobe on the current exam to correspond to a left lower lobe opacity described on the 7/13/2022 PET CT report.  Images from the 7/13/2022 PET CT are not available at the time of this report.       Follow up PET imaging 3/19/24 shows Similar appearing mild right breast skin thickening with decreased hypermetabolic activity. No new abnormal tracer uptake elsewhere worrisome for malignancy.   CT imaging 5/2024 SHERRILL recurrence  follow-up PET imaging 11/12/24 - SHERRILL recurrence  Patient clinically stable  Cont to monitor     3. stable   F/b Pulmonology  Pt on supp 02 at night   Cont MDI and O2 as prescribed   Ambulatory Referral to Pulmonology to establish care    4. Stable  On plavix.aspirin, lipitor 40 mg   Follow up with PCP and neurology for med  mgmt        schedule next PAC flush on same day as visit in 2mos  Cbc,cmp,  prior to f/u  2mos   Referral to pulmonology -establish care for COPD    Visit today included increased complexity associated with the care of the episodic problem COPD addressed and managing the longitudinal care of the patient due to the serious and/or complex managed problem(s) breast ca

## 2024-11-21 LAB
ALBUMIN SERPL ELPH-MCNC: 3.83 G/DL (ref 3.35–5.55)
ALPHA1 GLOB SERPL ELPH-MCNC: 0.25 G/DL (ref 0.17–0.41)
ALPHA2 GLOB SERPL ELPH-MCNC: 0.62 G/DL (ref 0.43–0.99)
B-GLOBULIN SERPL ELPH-MCNC: 0.73 G/DL (ref 0.5–1.1)
GAMMA GLOB SERPL ELPH-MCNC: 0.57 G/DL (ref 0.67–1.58)
PATHOLOGIST INTERPRETATION SPE: NORMAL
PROT SERPL-MCNC: 6 G/DL (ref 6–8.4)

## 2024-11-27 ENCOUNTER — INFUSION (OUTPATIENT)
Dept: INFUSION THERAPY | Facility: HOSPITAL | Age: 79
End: 2024-11-27
Attending: INTERNAL MEDICINE
Payer: MEDICARE

## 2024-11-27 ENCOUNTER — OFFICE VISIT (OUTPATIENT)
Dept: OTOLARYNGOLOGY | Facility: CLINIC | Age: 79
End: 2024-11-27
Payer: MEDICARE

## 2024-11-27 ENCOUNTER — CLINICAL SUPPORT (OUTPATIENT)
Dept: AUDIOLOGY | Facility: CLINIC | Age: 79
End: 2024-11-27
Payer: MEDICARE

## 2024-11-27 VITALS
WEIGHT: 155.63 LBS | SYSTOLIC BLOOD PRESSURE: 100 MMHG | DIASTOLIC BLOOD PRESSURE: 65 MMHG | BODY MASS INDEX: 27.57 KG/M2 | HEIGHT: 63 IN

## 2024-11-27 DIAGNOSIS — H90.A32 MIXED CONDUCTIVE AND SENSORINEURAL HEARING LOSS OF LEFT EAR WITH RESTRICTED HEARING OF RIGHT EAR: Primary | ICD-10-CM

## 2024-11-27 DIAGNOSIS — G60.9 HEREDITARY AND IDIOPATHIC NEUROPATHY, UNSPECIFIED: Primary | ICD-10-CM

## 2024-11-27 DIAGNOSIS — H93.8X1 EAR FULLNESS, RIGHT: ICD-10-CM

## 2024-11-27 DIAGNOSIS — H69.93 DYSFUNCTION OF BOTH EUSTACHIAN TUBES: Primary | ICD-10-CM

## 2024-11-27 DIAGNOSIS — H74.8X3 TYPE B TYMPANOGRAM, BILATERAL: ICD-10-CM

## 2024-11-27 PROCEDURE — 36591 DRAW BLOOD OFF VENOUS DEVICE: CPT | Mod: HCNC

## 2024-11-27 PROCEDURE — 92567 TYMPANOMETRY: CPT | Mod: S$GLB,,, | Performed by: AUDIOLOGIST

## 2024-11-27 PROCEDURE — 84425 ASSAY OF VITAMIN B-1: CPT | Mod: HCNC | Performed by: NEUROLOGICAL SURGERY

## 2024-11-27 RX ORDER — PREDNISONE 20 MG/1
20 TABLET ORAL DAILY
Qty: 5 TABLET | Refills: 0 | Status: SHIPPED | OUTPATIENT
Start: 2024-11-27 | End: 2024-12-02

## 2024-11-27 NOTE — PLAN OF CARE
Labs acquired from pt's accessed port. Port accessed with 20g, 3/4 in, power hickman needle. Site visibly free from signs of infection. Line flushed with heparin. Pt denied pain or discomfort. Care plan discussed with pt. Pt aware of upcoming appointment.      Problem: Fall Injury Risk  Goal: Absence of Fall and Fall-Related Injury  Outcome: Met

## 2024-11-27 NOTE — PROGRESS NOTES
"  Ear, Nose, & Throat  Otolaryngology - Head & Neck Surgery      Subjective:     Chief Complaint:   Chief Complaint   Patient presents with    Otitis Media       Ade Castellano is a 79 y.o. female who returns after seeing Dr. Mckee in August for right ear fullness.    Patient reports that she was persistent fullness in the right ear.  Continues to not have any infections he was history.  Does not have a previous history of tubes or ear surgery.  She states that she was constantly trying to pop her ear with little success.  She was a long-term use her Flonase for allergic rhinitis.  Previous audiogram showed a bilateral sensorineural hearing loss with a mixed loss on the left side and flat type B tymps bilaterally.    Of note, patient has Stage IV breast cancer. Recent PET/CT negative,    Notes from Dr Mckee Visit  "HPI      Ade Castellano is a 78 y.o. female presents for evaluation of intermittent ear fullness of her right ear.  She states this happens occasionally and lasts seconds.  Denies any associated hearing loss.  She was seen by me 2 years ago and noted to have middle ear effusions which she said got better.  She also reports dizziness that happens rarely when turning her head.  It is a non room spinning dizziness.  She describes it as a lightheaded feeling.    Assessment and Plan  1. Mixed hearing loss, bilateral     2. Ear fullness, right           Despite audiogram findings exam shows no sign of fluid.  Patient currently asymptomatic from her ears.  Do not recommend tube placement."      Past Medical History  Active Ambulatory Problems     Diagnosis Date Noted    Coronary artery disease of native artery of native heart with stable angina pectoris 08/23/2013    Old myocardial infarction 10/14/2014    Atherosclerosis of aorta 12/30/2015    Prediabetes 02/22/2017    DNR (do not resuscitate) 11/29/2017    Centrilobular emphysema 02/06/2018    Squamous cell carcinoma lung 03/02/2018    Metastatic " breast cancer 07/01/2019    Hypertension 10/10/2019    LOGAN (dyspnea on exertion) 08/05/2020    Chronic heart failure with preserved ejection fraction 08/05/2020    Cancer of overlapping sites of lung 10/25/2018    Dependence on supplemental oxygen 02/01/2022    Hypokalemia 04/06/2022    Generalized weakness 04/06/2022    Immunodeficiency due to chemotherapy 03/21/2023    Achalasia 11/15/2023    Allergic sinusitis 10/17/2024     Resolved Ambulatory Problems     Diagnosis Date Noted    PUD (peptic ulcer disease)     Benign hypertensive heart disease without heart failure 08/23/2013    Chest tightness 08/23/2013    Lump or mass in breast 06/17/2014    History of breast cancer 07/11/2014    SUNITHA (obstructive sleep apnea)     COPD exacerbation     Hyperlipidemia LDL goal <70 10/14/2014    Stable angina 10/14/2014    Obstructive chronic bronchitis without exacerbation 10/14/2014    Other emphysema 10/14/2014    Screening for colon cancer 03/17/2017    Personal history of colonic polyps 06/30/2017    H/O colonoscopy 06/01/2017    Esophageal dysphagia 10/11/2017    Acute on chronic respiratory failure with hypoxia and hypercapnia 11/29/2017    Tobacco abuse 11/29/2017    Lung mass 11/29/2017    Cavitary pneumonia 11/29/2017    Debility 12/03/2017    Squamous cell carcinoma of lung 03/07/2018    Allergic reaction 05/07/2018    History of lung cancer 05/07/2018    Encounter for chemotherapy management 05/07/2018    Severe anemia 06/11/2018    Hypomagnesemia 06/12/2018    Chemotherapy induced neutropenia 06/12/2018    Antineoplastic chemotherapy induced anemia 06/12/2018    Colon cancer screening 11/28/2018    Screen for colon cancer 01/29/2019    Neutropenic fever 07/20/2019    Chronic obstructive pulmonary disease with acute lower respiratory infection 07/20/2019    Neutropenia associated with infection 07/21/2019    Chemotherapy-induced neutropenia 07/21/2019    Chemotherapy follow-up examination 07/25/2019    Preop  cardiovascular exam 10/10/2019    Aortic arch atherosclerosis 06/11/2020    Abnormal stress test 08/05/2020    Dysphagia 01/25/2021    Stage 3 severe COPD by GOLD classification 10/25/2018    Secondary malignant neoplasm of unspecified site (CODE) 02/01/2022    Orthostatic hypotension 04/06/2022    Chronic respiratory failure with hypoxia, on home O2 therapy 03/21/2023    TIA (transient ischemic attack) 05/21/2024     Past Medical History:   Diagnosis Date    Acute respiratory failure with hypoxia and hypercapnia 11/29/2017    Angina pectoris     Arthritis     Bell's palsy     Breast cancer     CAD (coronary artery disease)     Cervical cancer     Chronic bronchitis     COPD (chronic obstructive pulmonary disease)     Dental bridge present     Emphysema of lung     History of Bell's palsy     History of heart artery stent     Hyperlipidemia     Lung cancer     Myocardial infarction     Pneumonia     Pneumonia due to other staphylococcus     Sleep apnea     Vaginal delivery        Past Surgical History  She has a past surgical history that includes Cervix surgery; Breast surgery; Tonsillectomy; Adenoidectomy; sweat glands axillary regions (Bilateral); Colonoscopy (N/A, 3/17/2017); Breast biopsy (Right); Colonoscopy (N/A, 6/30/2017); Portacath placement (Right, 01/2018); Colonoscopy (N/A, 11/28/2018); Colonoscopy (N/A, 1/29/2019); Eye surgery (Bilateral, 06/08/2018); Breast lumpectomy; Left heart catheterization (Left, 8/13/2020); Esophagogastroduodenoscopy (N/A, 1/25/2021); Coronary angioplasty with stent; Colonoscopy (N/A, 7/26/2022); Esophagogastroduodenoscopy (N/A, 11/8/2022); Esophageal manometry with measurement of impedance (N/A, 7/10/2023); and Esophagogastroduodenoscopy (N/A, 9/19/2023).    Past Surgical History:   Procedure Laterality Date    ADENOIDECTOMY      BREAST BIOPSY Right     x3    BREAST LUMPECTOMY      BREAST SURGERY      lumpectomy right side     CERVIX SURGERY      cone    COLONOSCOPY N/A  3/17/2017    Procedure: COLONOSCOPY;  Surgeon: Julio Rudd MD;  Location: [facility] ENDO;  Service: Endoscopy;  Laterality: N/A;    COLONOSCOPY N/A 6/30/2017    Procedure: COLONOSCOPY;  Surgeon: Julio Rudd MD;  Location: [facility] ENDO;  Service: Endoscopy;  Laterality: N/A;    COLONOSCOPY N/A 11/28/2018    Procedure: COLONOSCOPY;  Surgeon: Nura Urbina MD;  Location: [facility] ENDO;  Service: Endoscopy;  Laterality: N/A;    COLONOSCOPY N/A 1/29/2019    Procedure: COLONOSCOPY;  Surgeon: Emmanuel Perez MD;  Location: [facility] ENDO;  Service: Endoscopy;  Laterality: N/A;  confirmed appt-SP    COLONOSCOPY N/A 7/26/2022    Procedure: COLONOSCOPY;  Surgeon: James Healy MD;  Location: [facility] ENDO;  Service: Endoscopy;  Laterality: N/A;  fully vaccinated -  mediport in chest -     CORONARY ANGIOPLASTY WITH STENT PLACEMENT      ESOPHAGEAL MANOMETRY WITH MEASUREMENT OF IMPEDANCE N/A 7/10/2023    Procedure: MANOMETRY, ESOPHAGUS, WITH IMPEDANCE MEASUREMENT;  Surgeon: Miller Phillips MD;  Location: [facility] (4TH FLR);  Service: Gastroenterology;  Laterality: N/A;  pt on oxygen 2.5L  pt requested Tuesday only  5/31 referred by Dr. Miller Phillips/instr. mailed-st  7/3-r/s, updated instructions reviewed with pt in detail via phone, pt verbalized understanding-KPvt    ESOPHAGOGASTRODUODENOSCOPY N/A 1/25/2021    Procedure: EGD (ESOPHAGOGASTRODUODENOSCOPY);  Surgeon: Dmitry Gonzalez MD;  Location: [facility];  Service: Endoscopy;  Laterality: N/A;  rapid test >50 miles -ml    ESOPHAGOGASTRODUODENOSCOPY N/A 11/8/2022    Procedure: EGD (ESOPHAGOGASTRODUODENOSCOPY);  Surgeon: Tapan tSevenson MD;  Location: [facility] ENDO;  Service: Endoscopy;  Laterality: N/A;  w/dilation  fully vaccinated, instructions mailed-Kpvt  11/4 pt called did not receive instructions, does not have portal or email, went over prep instructions and medication instructions with pt on the phone -LW    ESOPHAGOGASTRODUODENOSCOPY N/A 9/19/2023    Procedure: EGD  "(ESOPHAGOGASTRODUODENOSCOPY);  Surgeon: Miller Phillips MD;  Location: [facility] ENDO;  Service: Endoscopy;  Laterality: N/A;  botox injection  inst mailed    EYE SURGERY Bilateral 06/08/2018    cataract     LEFT HEART CATHETERIZATION Left 8/13/2020    Procedure: Left heart cath, rra, noon;  Surgeon: Guevara Skelton MD;  Location: [facility] CATH LAB;  Service: Cardiology;  Laterality: Left;  RN PREOP 8/7/2020---COVID NEGATIVE ON 8/12    PORTACATH PLACEMENT Right 01/2018    sweat glands axillary regions Bilateral     TONSILLECTOMY          Family History  Her family history includes Breast cancer in her mother; COPD in her sister; Cancer in her father, maternal grandfather, maternal grandmother, mother, paternal grandfather, paternal grandmother, sister, and sister; Heart attack in her sister; Heart disease in her maternal grandfather, maternal grandmother, mother, and sister; Kidney cancer in her son.    Social History  She reports that she quit smoking about 7 years ago. Her smoking use included cigarettes. She started smoking about 57 years ago. She has a 12.5 pack-year smoking history. She has been exposed to tobacco smoke. She has never used smokeless tobacco. She reports that she does not drink alcohol and does not use drugs.    Allergies  She has No Known Allergies.    Medications  She has a current medication list which includes the following prescription(s): acetaminophen, albuterol, albuterol, aspirin, azelastine, diclofenac, fluticasone propionate, isosorbide mononitrate, levocetirizine, methocarbamol, metoprolol tartrate, montelukast, neomycin-polymyxin-hydrocortisone, nitroglycerin, pantoprazole, rosuvastatin, trelegy ellipta, and prednisone.    ROS:  Pertinent positive and negative review of systems as noted in HPI.     Objective:     /65 (BP Location: Left arm, Patient Position: Sitting)   Ht 5' 3" (1.6 m)   Wt 70.6 kg (155 lb 10.3 oz)   LMP  (LMP Unknown)   BMI 27.57 kg/m²    Physical " Exam  Constitutional: Well appearing, communicating. No acute distress  Voice: Euphonic  Eyes: Conjunctiva WNL, Pupils reactive  Head/Face: House Brackmann I Bilaterally.  Right Ear:    Auricle normally developed   EAC: normal   Tympanic membrane: intact and myringosclerosis present   Middle Ear: No effusion present and Ossicles in normal position  Left Ear:    Auricle normally developed   EAC: normal   Tympanic membrane: intact thick, outer rim of myringosclerosis present   Middle Ear: No effusion present and Ossicles in normal position  Vestibular:    Spontaneous Nystagmus: none  Nose:    Septum Midline Anteriorly   mild edematous turbinate hypertrophy   no rhinorrhea present  Tenderness to sinus palpation in not present  Oral Cavity:   No masses or lesions present     Oropharynx:   No masses or lesions within oropharynx. Tonsillar fossas symmetric. No erythema.   Cobblestoning absent  Neuro/Psychiatric:     Affect: Appropriate  Respiratory: No increased WOB, no stridor       Data Review:   LABS      IMAGING    No pertinent imaging available    AUDIO  08/20204  d:    2024    I have independently reviewed the following audiogram and reviewed the report. Findings as follows:     Tympanometry  Right: Type B  Left: Type B      Procedures:       Assessment:     1. Mixed conductive and sensorineural hearing loss of left ear with restricted hearing of right ear    2. Type b tympanogram, bilateral    3. Ear fullness, right        Plan:     Unclear cause of type B tympanometry and mixed hearing loss on the left; however, right ear is most bothersome.  Could be component of Eustachian tube dysfunction versus noted tympanosclerosis.  Discussed a trial of OCS to see if there is reversible obstructive Eustachian tube dysfunction at play.  If responsive could consider a balloon dilation of the Eustachian tube on the right side.  Patient understood the uncertainty of this treatment plan.  Wishes to consider tenuous due to impact  on quality of life.      As I discussed with her, I suspect that she has obstructive eustachian tube dysfunction of the right eustachian tube(s). We discussed a short course of oral steroids and intranasal steroid. We discussed the rationale of both treatments. The OCS is intended to provide acute symptoms relief while also giving insight into the reversibility of the obstruction; however, we discussed how this is not a reasonable long-term plan. We discussed that the maximum efficacy of the intranasal steroid occurs around 2-4 weeks of continuous use, and would provide maintenance therapy for their condition.. I also gave her a handout with ear popping maneuvers to practice daily until symptoms resolve. The patient was afforded an opportunity to ask questions and showed good understanding of their condition.     Voice recognition software was used in the creation of this note/communication and any sound-alike errors which may have occurred from its use should be taken in context when interpreting. If such errors prevent a clear understanding of the note/communication, please contact the office for clarification.   Problem List Items Addressed This Visit    None  Visit Diagnoses       Mixed conductive and sensorineural hearing loss of left ear with restricted hearing of right ear    -  Primary    Type b tympanogram, bilateral        Ear fullness, right

## 2024-11-27 NOTE — PROGRESS NOTES
Ms. Ade Castellano was seen in the clinic today for tympanometry testing.  Ms. Castellano reported fluid in her ears for the last 5 weeks.    Tympanometry testing revealed a Type B (normal ear canal volume) tympanogram for the right ear and a Type B (normal ear canal volume) tympanogram for the left ear.      Recommendations:  1. Otologic evaluation

## 2024-12-10 ENCOUNTER — TELEPHONE (OUTPATIENT)
Dept: OTOLARYNGOLOGY | Facility: CLINIC | Age: 79
End: 2024-12-10
Payer: MEDICARE

## 2024-12-10 NOTE — TELEPHONE ENCOUNTER
Spoke with patient she stated no one called her yesterday regarding her message. Worked her in on dr jauregui schedule for 12/18/2024

## 2024-12-10 NOTE — TELEPHONE ENCOUNTER
----- Message from DURAN Bruce sent at 12/10/2024  8:37 AM CST -----  Good morning,     Please assist with scheduling this patient to see  for a follow up. She says that the medication given is not helping, so he would like to see her for a follow up.      CHOLO HARRIS MRN [id number]      Thank you  ----- Message -----  From: Emeka Tariq MD  Sent: 12/10/2024   8:18 AM CST  To: Janis Morales RN    Book next available  ----- Message -----  From: Janis Morales RN  Sent: 12/9/2024   3:43 PM CST  To: Emeka Tariq MD      ----- Message -----  From: Sofiya Pritchard  Sent: 12/9/2024   3:27 PM CST  To: Chandni Hendrickson Staff    Who called: Self      What is the request in detail: pt states the pills that where given to her by the dr has not help her ear      Can the clinic reply by [facility]? No      Would the patient rather a call back or a response via [facility]? Call Back      Best call back number: .[phone]       Additional Information:

## 2024-12-16 RX ORDER — METOPROLOL TARTRATE 25 MG/1
25 TABLET, FILM COATED ORAL 2 TIMES DAILY
Qty: 180 TABLET | Refills: 3 | Status: SHIPPED | OUTPATIENT
Start: 2024-12-16

## 2024-12-17 ENCOUNTER — OFFICE VISIT (OUTPATIENT)
Dept: PULMONOLOGY | Facility: CLINIC | Age: 79
End: 2024-12-17
Payer: MEDICARE

## 2024-12-17 VITALS
WEIGHT: 158.06 LBS | SYSTOLIC BLOOD PRESSURE: 112 MMHG | OXYGEN SATURATION: 97 % | DIASTOLIC BLOOD PRESSURE: 73 MMHG | HEIGHT: 63 IN | HEART RATE: 56 BPM | BODY MASS INDEX: 28 KG/M2

## 2024-12-17 DIAGNOSIS — J44.9 CHRONIC OBSTRUCTIVE PULMONARY DISEASE, UNSPECIFIED COPD TYPE: ICD-10-CM

## 2024-12-17 DIAGNOSIS — T17.208A OROPHARYNGEAL ASPIRATION, INITIAL ENCOUNTER: ICD-10-CM

## 2024-12-17 DIAGNOSIS — G47.30 SLEEP APNEA, UNSPECIFIED TYPE: Primary | ICD-10-CM

## 2024-12-17 PROCEDURE — 3288F FALL RISK ASSESSMENT DOCD: CPT | Mod: HCNC,CPTII,S$GLB, | Performed by: INTERNAL MEDICINE

## 2024-12-17 PROCEDURE — 99204 OFFICE O/P NEW MOD 45 MIN: CPT | Mod: HCNC,S$GLB,, | Performed by: INTERNAL MEDICINE

## 2024-12-17 PROCEDURE — 99999 PR PBB SHADOW E&M-EST. PATIENT-LVL V: CPT | Mod: PBBFAC,HCNC,, | Performed by: INTERNAL MEDICINE

## 2024-12-17 PROCEDURE — 1101F PT FALLS ASSESS-DOCD LE1/YR: CPT | Mod: HCNC,CPTII,S$GLB, | Performed by: INTERNAL MEDICINE

## 2024-12-17 PROCEDURE — 1157F ADVNC CARE PLAN IN RCRD: CPT | Mod: HCNC,CPTII,S$GLB, | Performed by: INTERNAL MEDICINE

## 2024-12-17 PROCEDURE — 1160F RVW MEDS BY RX/DR IN RCRD: CPT | Mod: HCNC,CPTII,S$GLB, | Performed by: INTERNAL MEDICINE

## 2024-12-17 PROCEDURE — 1126F AMNT PAIN NOTED NONE PRSNT: CPT | Mod: HCNC,CPTII,S$GLB, | Performed by: INTERNAL MEDICINE

## 2024-12-17 PROCEDURE — 3078F DIAST BP <80 MM HG: CPT | Mod: HCNC,CPTII,S$GLB, | Performed by: INTERNAL MEDICINE

## 2024-12-17 PROCEDURE — 3074F SYST BP LT 130 MM HG: CPT | Mod: HCNC,CPTII,S$GLB, | Performed by: INTERNAL MEDICINE

## 2024-12-17 PROCEDURE — 1159F MED LIST DOCD IN RCRD: CPT | Mod: HCNC,CPTII,S$GLB, | Performed by: INTERNAL MEDICINE

## 2024-12-17 RX ORDER — FLUTICASONE FUROATE, UMECLIDINIUM BROMIDE AND VILANTEROL TRIFENATATE 200; 62.5; 25 UG/1; UG/1; UG/1
1 POWDER RESPIRATORY (INHALATION) DAILY
Qty: 60 EACH | Refills: 11 | Status: SHIPPED | OUTPATIENT
Start: 2024-12-17 | End: 2025-12-17

## 2024-12-17 RX ORDER — GABAPENTIN 300 MG/1
300 CAPSULE ORAL
COMMUNITY
Start: 2024-11-18

## 2024-12-17 NOTE — PROGRESS NOTES
"    General Pulmonary Clinic  New Patient Visit      Chief Complaint: COPD     HPI     Ade Castellano is a 79 y.o. female with a history of  COPD, breast/lung cancer, small vessel, CAD, SUNITHA, achalasia presenting with chief complaint of chronic hypoxemic respiratory failure 2/2 copd    # COPD    Uable to quantify exercise tolerance -- mmRC 3, completes all ADLs  Has chronic productive cough w/ clear/white phlegm  # of exacerbations per year 0, requiring hospitalization - 2017 had post obstructive PNA requiring intubation  Current regimen: trelegy, ventolin 1-2 puff per day, rx oxygen 2.5 L/min with exertion  She has daytime sleepiness, snoring -- had sleep study that was consistent w/ SUNITHA but was unable to tolerate mask fitting  Denies GERD but does have hx of achalasia with regurgitation and aspiration. She however feels that her trouble swallowing is at the level of her neck/back of mouth  Participated in pulmonary rehab yes [] no[x]  Historical eosinophilia yes [] no[x]   Known triggers yes [] no[x]   Childhood asthma yes [] no[x]  Previously seen by Dr. Katz at [facility] [place] -- pfts " severe obstructive FEV1 30-49% predicted) w gas trapping and moderately reduced DLCO 40-59%  Former smoker 2 ppd x 60 years, 2017  Exposures: worked in construction, security [employer]      Records reviewed with the following findings ---  Hematology-oncology noted 11/20/24 personally reviewed  - hx of stage 1A R breast cancer s/p lumpectomy/XRT 2014  - stage IV SCC R lung 2/14/208 s/p carboplatin/abraxane 2018 x 6 cycles  - Recurrent breast cancer R pbreast w/ R axillary LN mets - placed on AC x 4 cycles, XRT to axilla -> pembrolizumab [id number]  - 9/2024 PET CT w/ no signs of active disease    CT chest 5/2024 personally interpreted no suspicious pulmonary nodules  Objective   Past History     Past Medical History:  Past Medical History:   Diagnosis Date    Abnormal stress test 8/5/2020    Acute respiratory failure with " hypoxia and hypercapnia 11/29/2017    Angina pectoris     Arthritis     Bell's palsy     left facial weakness    Breast cancer     RIGHT    CAD (coronary artery disease)     Cervical cancer     Chronic bronchitis     Chronic heart failure with preserved ejection fraction 8/5/2020    Chronic heart failure with preserved ejection fraction 8/5/2020    COPD (chronic obstructive pulmonary disease)     Dr. Katz    Dental bridge present     Emphysema of lung     H/O colonoscopy 06/2017    due for repeat colonsocopy in 6/2018    History of Bell's palsy     History of heart artery stent     Dr. Ortiz  x2 stents    Hyperlipidemia     Hypertension     Lung cancer     Myocardial infarction     SUNITHA (obstructive sleep apnea)     intolerant to mask    SUNITHA (obstructive sleep apnea)     intolerant to mask     Pneumonia     Pneumonia due to other staphylococcus     PUD (peptic ulcer disease)     Severe anemia 6/11/2018    Sleep apnea     Vaginal delivery     x1         Past Surgical History:  Past Surgical History:   Procedure Laterality Date    ADENOIDECTOMY      BREAST BIOPSY Right     x3    BREAST LUMPECTOMY      BREAST SURGERY      lumpectomy right side     CERVIX SURGERY      cone    COLONOSCOPY N/A 3/17/2017    Procedure: COLONOSCOPY;  Surgeon: Julio Rudd MD;  Location: [facility] ENDO;  Service: Endoscopy;  Laterality: N/A;    COLONOSCOPY N/A 6/30/2017    Procedure: COLONOSCOPY;  Surgeon: Julio Rudd MD;  Location: [facility] ENDO;  Service: Endoscopy;  Laterality: N/A;    COLONOSCOPY N/A 11/28/2018    Procedure: COLONOSCOPY;  Surgeon: Nura Urbina MD;  Location: [facility] ENDO;  Service: Endoscopy;  Laterality: N/A;    COLONOSCOPY N/A 1/29/2019    Procedure: COLONOSCOPY;  Surgeon: Emmanuel Perez MD;  Location: [facility] ENDO;  Service: Endoscopy;  Laterality: N/A;  confirmed appt-SP    COLONOSCOPY N/A 7/26/2022    Procedure: COLONOSCOPY;  Surgeon: James Healy MD;  Location: [facility] ENDO;  Service: Endoscopy;  Laterality: N/A;  fully  vaccinated -  mediport in [facility]    CORONARY ANGIOPLASTY WITH STENT PLACEMENT      ESOPHAGEAL MANOMETRY WITH MEASUREMENT OF IMPEDANCE N/A 7/10/2023    Procedure: MANOMETRY, ESOPHAGUS, WITH IMPEDANCE MEASUREMENT;  Surgeon: Miller Phillips MD;  Location: [facility] ([facility] F[facility]);  Service: Gastroenterology;  Laterality: N/A;  pt on oxygen 2.5L  pt requested Tuesday only  5/31 referred by Dr. Miller Phillips/instr. mailed-st  7/3-r/s, updated instructions reviewed with pt in detail via phone, pt verbalized understanding-[facility]    ESOPHAGOGASTRODUODENOSCOPY N/A 1/25/2021    Procedure: EGD (ESOPHAGOGASTRODUODENOSCOPY);  Surgeon: Dmitry Gonzalez MD;  Location: [facility];  Service: Endoscopy;  Laterality: N/A;  rapid test >50 miles -ml    ESOPHAGOGASTRODUODENOSCOPY N/A 11/8/2022    Procedure: EGD (ESOPHAGOGASTRODUODENOSCOPY);  Surgeon: Tapan Stevenson MD;  Location: [facility];  Service: Endoscopy;  Laterality: N/A;  w/dilation  fully vaccinated, instructions mailed-\[facility] [place] [facility]\""  11/4 pt called did not receive instructions, does not have portal or email, went over prep instructions and medication instructions with pt on the phone -LW    ESOPHAGOGASTRODUODENOSCOPY N/A 9/19/2023    Procedure: EGD (ESOPHAGOGASTRODUODENOSCOPY);  Surgeon: Miller Phillips MD;  Location: [facility];  Service: Endoscopy;  Laterality: N/A;  botox injection  inst mailed    EYE SURGERY Bilateral 06/08/2018    cataract     LEFT HEART CATHETERIZATION Left 8/13/2020    Procedure: Left heart cath, rra, noon;  Surgeon: Guevara Skelton MD;  Location: [facility] CATH LAB;  Service: Cardiology;  Laterality: Left;  RN PREOP 8/7/2020---COVID NEGATIVE ON 8/12    PORTACATH PLACEMENT Right 01/2018    sweat glands axillary regions Bilateral     TONSILLECTOMY           Social History:   Social History     Socioeconomic History    Marital status: Single   Tobacco Use    Smoking status: Former     Current packs/day: 0.00     Average packs/day: 0.3 packs/day for 50.0 years (12.5  ttl pk-yrs)     Types: Cigarettes     Start date: 1967     Quit date: 2017     Years since quittin.1     Passive exposure: Past    Smokeless tobacco: Never    Tobacco comments:     7 years since smoked    Substance and Sexual Activity    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     Drug use: No    Sexual activity: Not Currently     Social Drivers of Health     Financial Resource Strain: Low Risk  (2024)    Overall Financial Resource Strain (CARDIA)     Difficulty of Paying Living Expenses: Not hard at all   Food Insecurity: No Food Insecurity (2024)    Hunger Vital Sign     Worried About Running Out of Food in the Last Year: Never true     Ran Out of Food in the Last Year: Never true   Transportation Needs: No Transportation Needs (10/17/2024)    PRAPARE - Transportation     Lack of Transportation (Medical): No     Lack of Transportation (Non-Medical): No   Physical Activity: Inactive (2024)    Exercise Vital Sign     Days of Exercise per Week: 0 days     Minutes of Exercise per Session: 0 min   Stress: No Stress Concern Present (2024)    [employer] [employer] - Occupational Stress Questionnaire     Feeling of Stress : Not at all   Housing Stability: Low Risk  (2024)    Housing Stability Vital Sign     Unable to Pay for Housing in the Last Year: No     Homeless in the Last Year: No         Family Hx:  No family history of pulmonary fibrosis, pre-mature graying of hair, cirrhosis, or hematologic malignancies  No family history of emphysema or spontaneous PTX  Father - passed lung cancer in his 70s    Allergies and Pertinent Medications   Allergies and Medications Reviewed    Patient has no known allergies.  Current Outpatient Medications on File Prior to Visit   Medication Sig Dispense Refill    acetaminophen (TYLENOL) 650 MG TbSR Take 650 mg by mouth every 8 (eight) hours.      albuterol (PROVENTIL) 2.5 mg /3 mL (0.083 %) nebulizer solution NEBULIZE 1  vial EVERY 6 HOURS AS NEEDED FOR WHEEZING 540 mL 1    albuterol (VENTOLIN HFA) 90 mcg/actuation inhaler Inhale 2 puffs into the lungs every 6 (six) hours as needed for Wheezing. Rescue      aspirin (ECOTRIN) 81 MG EC tablet Take 81 mg by mouth once daily.       fluticasone propionate (FLONASE) 50 mcg/actuation nasal spray SHAKE WELL & USE TWO SPRAYS EACH NOSTRIL ONCE A DAY 48 g 3    gabapentin (NEURONTIN) 300 MG capsule Take 300 mg by mouth.      isosorbide mononitrate (IMDUR) 30 MG 24 hr tablet TAKE 1 TABLET BY MOUTH EVERY DAY. Hold if SBP <120 90 tablet 3    levocetirizine (XYZAL) 5 MG tablet TAKE 1 TABLET BY MOUTH EVERY DAY IN THE EVENING FOR ALLERGIES 90 tablet 1    metoprolol tartrate (LOPRESSOR) 25 MG tablet TAKE 1 TABLET BY MOUTH TWICE DAILY 180 tablet 3    montelukast (SINGULAIR) 10 mg tablet 1 tablet Orally Once a day for 30 days      pantoprazole (PROTONIX) 40 MG tablet Take 1 tablet (40 mg total) by mouth once daily. 90 tablet 3    rosuvastatin (CRESTOR) 20 MG tablet Take 1 tablet (20 mg total) by mouth once daily. 90 tablet 3    TRELEGY ELLIPTA 100-62.5-25 mcg DsDv INHALE ONE PUFF INTO THE LUNGS ONCE A DAY  5    azelastine (ASTELIN) 137 mcg (0.1 %) nasal spray 1 puff in each nostril Nasally Twice a day for 30 days (Patient not taking: Reported on 12/17/2024)      diclofenac (VOLTAREN) 50 MG EC tablet TAKE 1 TABLET BY MOUTH TWICE DAILY AS NEEDED (Patient not taking: Reported on 12/17/2024) 45 tablet 2    methocarbamoL (ROBAXIN) 500 MG Tab Take 1 tablet (500 mg total) by mouth 2 (two) times daily as needed. *MAY CAUSE DROWSINESS (Patient not taking: Reported on 12/17/2024) 20 tablet 0    neomycin-polymyxin-hydrocortisone (CORTISPORIN) 3.5-10,000-1 mg/mL-unit/mL-% otic suspension Place 3 drops into both ears 4 (four) times daily. (Patient not taking: Reported on 12/17/2024) 10 mL 1    nitroGLYCERIN (NITROSTAT) 0.4 MG SL tablet PLACE 1 TAB UNDER TONGUE EVERY 5 MINUTES x 3 DOSES AS NEEDED FOR CHEST PAIN. IF  "NOT RESOLVED CALL 911 (Patient not taking: Reported on 12/17/2024) 25 tablet 11     No current facility-administered medications on file prior to visit.              Physical Exam   /73   Pulse (!) 56   Ht 5' 3" (1.6 m)   Wt 71.7 kg (158 lb 1.1 oz)   LMP  (LMP Unknown)   SpO2 97%   BMI 28.00 kg/m²       Constitutional: No acute distress, Atraumatic   HEENT: moist mucus membranes, extraocular movements intact  Cardiovascular: regular rate and rhythm, no murmurs, rubs or gallops  Pulmonary: normal respiratory rate and chest rise, no chest wall deformity, normal breath sounds with no wheezing or crackles  Abdominal: non-distended, bowel sounds present  Musculoskeletal: No lower extremity edema, no clubbing  Neurological: normal speech/abhay, moves all extremities against gravity  Skin: no finger cyanosis, no rashes on exposed body parts  Psych: Appropriate affect, normal mood       Diagnostic Studies      All diagnostic studies relevant to chief complaint reviewed personally    Labs:  Lab Results   Component Value Date    WBC 5.67 11/20/2024    WBC 5.67 11/20/2024    HGB 13.2 11/20/2024    HGB 13.2 11/20/2024     11/20/2024     11/20/2024    MCV 93 11/20/2024    MCV 93 11/20/2024     Lab Results   Component Value Date     11/20/2024     11/20/2024    K 3.9 11/20/2024    K 3.9 11/20/2024    CO2 25 11/20/2024    CO2 25 11/20/2024    BUN 20 11/20/2024    BUN 20 11/20/2024    CREATININE 0.9 11/20/2024    CREATININE 0.9 11/20/2024    MG 1.7 11/20/2024    MG 1.7 11/20/2024     Lab Results   Component Value Date    AST 13 11/20/2024    AST 13 11/20/2024    ALT 14 11/20/2024    ALT 14 11/20/2024    ALBUMIN 3.8 11/20/2024    ALBUMIN 3.8 11/20/2024    PROT 6.6 11/20/2024    PROT 6.6 11/20/2024    BILITOT 0.5 11/20/2024    BILITOT 0.5 11/20/2024         PFTs:  Pulmonary Functions Testing Results:  No results found for: "FEV1", "FVC", "IFK0ORX", "TLC", "DLCO"      FVC FEV1 FEV/FVC TLC DLCO " 6MWT Interpret                                                         TTE:  Results for orders placed during the hospital encounter of 05/21/24    Echo Saline Bubble? Yes    Interpretation Summary    No intracardiac source of embolus noted on this exam.  If high clinical suspicion, consider MORELIA +/- bubble study.    Left Ventricle: The left ventricle is normal in size. Normal wall thickness. There is normal systolic function with a visually estimated ejection fraction of 65 - 70%. Grade I diastolic dysfunction.    Right Ventricle: Normal right ventricular cavity size. Systolic function is normal.    Left Atrium: Left atrium is mildly dilated. Agitated saline study of the atrial septum is negative after vasalva maneuver, suggesting absence of intracardiac shunt at the atrial level.    Aortic Valve: The aortic valve is a trileaflet valve. There is moderate aortic valve sclerosis. Mildly restricted motion. There is mild to moderate stenosis. Aortic valve area by VTI is 1.22 cm². Aortic valve peak velocity is 2.96 m/s. Mean gradient is 22 mmHg. The dimensionless index is 0.42.    Pulmonary Artery: The estimated pulmonary artery systolic pressure is 33 mmHg.            Assessment and Plan   Ade Castellano is a 79 y.o. female  history of  COPD, breast/lung cancer, small vessel, CAD, SUNITHA, achalasia presenting with chief complaint of chronic hypoxemic respiratory failure 2/2 copd requiring 2-3L/min w/ exertion and sleep    Problem List:  # COPD Group B (mmRC 3) - chronic, stable but is still very symptomatic despite trelegy use. Explained poor control may be related to untreated SUNITHA, possible aspiration. Despite reviewing risks of untreated SUNITHA, she declines a titration study. Increase trelegy to 200 mcg dose daily, continue O2 2.5 L/min NC with sleep, assess with walk test requirement w/ exertion. Declined pulmonary rehab, see below for suspected aspiration. Repeat PFTs  # aspiration w/ hx of achalasia - chronic, stable  - feels that her symptoms are oropharyngeal. Will have speech evaluation  # SUNITHA - chronic, untreated - Despite reviewing risks of untreated SUNITHA, she declines a titration study.  # hx of lung cancer - in remission - will discuss w/ Dr. Holliday whether CT chest monitoring for recurrence preferred over PET/CT.      Differential, diagnoses, diagnostic work up, and possible treatments were discussed with the patient. Questions were answered.    Jaqueline Masterson MD  Pulmonary-Critical Care Medicine              For this visit, the following time was spent  preparing to see the patient (e.g., review of tests) 10 minutes  obtaining and/or reviewing separately obtained history 0 minutes  Performing a medically necessary appropriate examination and/or evaluation 15 minutes  Counseling and educating the patient/family/caregiver 13 minutes  Ordering medications, tests, or procedures 2 minutes  Referring and communicating with other health care professionals (when not reported separately) 2minutes  Documenting clinical information in the electronic or other health record 8minutes  Care coordination (not reported separately) 0minutes    Total time = 50 minutes

## 2024-12-18 ENCOUNTER — OFFICE VISIT (OUTPATIENT)
Dept: OTOLARYNGOLOGY | Facility: CLINIC | Age: 79
End: 2024-12-18
Payer: MEDICARE

## 2024-12-18 VITALS
DIASTOLIC BLOOD PRESSURE: 75 MMHG | WEIGHT: 157.88 LBS | HEART RATE: 50 BPM | HEIGHT: 63 IN | SYSTOLIC BLOOD PRESSURE: 131 MMHG | BODY MASS INDEX: 27.97 KG/M2

## 2024-12-18 DIAGNOSIS — H69.91 DYSFUNCTION OF RIGHT EUSTACHIAN TUBE: ICD-10-CM

## 2024-12-18 DIAGNOSIS — H74.8X3 TYPE B TYMPANOGRAM, BILATERAL: ICD-10-CM

## 2024-12-18 DIAGNOSIS — H65.21 RIGHT CHRONIC SEROUS OTITIS MEDIA: Primary | ICD-10-CM

## 2024-12-18 DIAGNOSIS — H90.A32 MIXED CONDUCTIVE AND SENSORINEURAL HEARING LOSS OF LEFT EAR WITH RESTRICTED HEARING OF RIGHT EAR: ICD-10-CM

## 2024-12-18 PROCEDURE — 3075F SYST BP GE 130 - 139MM HG: CPT | Mod: CPTII,S$GLB,, | Performed by: STUDENT IN AN ORGANIZED HEALTH CARE EDUCATION/TRAINING PROGRAM

## 2024-12-18 PROCEDURE — 1126F AMNT PAIN NOTED NONE PRSNT: CPT | Mod: CPTII,S$GLB,, | Performed by: STUDENT IN AN ORGANIZED HEALTH CARE EDUCATION/TRAINING PROGRAM

## 2024-12-18 PROCEDURE — 99214 OFFICE O/P EST MOD 30 MIN: CPT | Mod: S$GLB,,, | Performed by: STUDENT IN AN ORGANIZED HEALTH CARE EDUCATION/TRAINING PROGRAM

## 2024-12-18 PROCEDURE — 3078F DIAST BP <80 MM HG: CPT | Mod: CPTII,S$GLB,, | Performed by: STUDENT IN AN ORGANIZED HEALTH CARE EDUCATION/TRAINING PROGRAM

## 2024-12-18 PROCEDURE — 1157F ADVNC CARE PLAN IN RCRD: CPT | Mod: CPTII,S$GLB,, | Performed by: STUDENT IN AN ORGANIZED HEALTH CARE EDUCATION/TRAINING PROGRAM

## 2024-12-18 PROCEDURE — 1159F MED LIST DOCD IN RCRD: CPT | Mod: CPTII,S$GLB,, | Performed by: STUDENT IN AN ORGANIZED HEALTH CARE EDUCATION/TRAINING PROGRAM

## 2024-12-18 PROCEDURE — 1160F RVW MEDS BY RX/DR IN RCRD: CPT | Mod: CPTII,S$GLB,, | Performed by: STUDENT IN AN ORGANIZED HEALTH CARE EDUCATION/TRAINING PROGRAM

## 2024-12-18 RX ORDER — OFLOXACIN 3 MG/ML
5 SOLUTION AURICULAR (OTIC) DAILY
Qty: 10 ML | Refills: 0 | Status: SHIPPED | OUTPATIENT
Start: 2024-12-18 | End: 2024-12-25

## 2024-12-18 NOTE — PROGRESS NOTES
"  Ear, Nose, & Throat  Otolaryngology - Head & Neck Surgery      Subjective:     Chief Complaint:   Chief Complaint   Patient presents with    Hearing Loss     R     12/18/2024:  Returns today with right ear fullness.  She did not improve on oral steroids or appropriate trial Flonase.  She states she still feels fluid in the right ear.    HPI:  Ade Castellano is a 79 y.o. female who returns after seeing Dr. Mckee in August for right ear fullness.    Patient reports that she was persistent fullness in the right ear.  Continues to not have any infections he was history.  Does not have a previous history of tubes or ear surgery.  She states that she was constantly trying to pop her ear with little success.  She was a long-term use her Flonase for allergic rhinitis.  Previous audiogram showed a bilateral sensorineural hearing loss with a mixed loss on the left side and flat type B tymps bilaterally.    Of note, patient has Stage IV breast cancer. Recent PET/CT negative,    Notes from Dr Mckee Visit  "HPI      Ade Castellano is a 78 y.o. female presents for evaluation of intermittent ear fullness of her right ear.  She states this happens occasionally and lasts seconds.  Denies any associated hearing loss.  She was seen by me 2 years ago and noted to have middle ear effusions which she said got better.  She also reports dizziness that happens rarely when turning her head.  It is a non room spinning dizziness.  She describes it as a lightheaded feeling.    Assessment and Plan  1. Mixed hearing loss, bilateral     2. Ear fullness, right           Despite audiogram findings exam shows no sign of fluid.  Patient currently asymptomatic from her ears.  Do not recommend tube placement."      Past Medical History  Active Ambulatory Problems     Diagnosis Date Noted    Coronary artery disease of native artery of native heart with stable angina pectoris 08/23/2013    Old myocardial infarction 10/14/2014    " Atherosclerosis of aorta 12/30/2015    Prediabetes 02/22/2017    DNR (do not resuscitate) 11/29/2017    Centrilobular emphysema 02/06/2018    Squamous cell carcinoma lung 03/02/2018    Metastatic breast cancer 07/01/2019    Hypertension 10/10/2019    LOGAN (dyspnea on exertion) 08/05/2020    Chronic heart failure with preserved ejection fraction 08/05/2020    Cancer of overlapping sites of lung 10/25/2018    Dependence on supplemental oxygen 02/01/2022    Hypokalemia 04/06/2022    Generalized weakness 04/06/2022    Immunodeficiency due to chemotherapy 03/21/2023    Achalasia 11/15/2023    Allergic sinusitis 10/17/2024     Resolved Ambulatory Problems     Diagnosis Date Noted    PUD (peptic ulcer disease)     Benign hypertensive heart disease without heart failure 08/23/2013    Chest tightness 08/23/2013    Lump or mass in breast 06/17/2014    History of breast cancer 07/11/2014    SUNITHA (obstructive sleep apnea)     COPD exacerbation     Hyperlipidemia LDL goal <70 10/14/2014    Stable angina 10/14/2014    Obstructive chronic bronchitis without exacerbation 10/14/2014    Other emphysema 10/14/2014    Screening for colon cancer 03/17/2017    Personal history of colonic polyps 06/30/2017    H/O colonoscopy 06/01/2017    Esophageal dysphagia 10/11/2017    Acute on chronic respiratory failure with hypoxia and hypercapnia 11/29/2017    Tobacco abuse 11/29/2017    Lung mass 11/29/2017    Cavitary pneumonia 11/29/2017    Debility 12/03/2017    Squamous cell carcinoma of lung 03/07/2018    Allergic reaction 05/07/2018    History of lung cancer 05/07/2018    Encounter for chemotherapy management 05/07/2018    Severe anemia 06/11/2018    Hypomagnesemia 06/12/2018    Chemotherapy induced neutropenia 06/12/2018    Antineoplastic chemotherapy induced anemia 06/12/2018    Colon cancer screening 11/28/2018    Screen for colon cancer 01/29/2019    Neutropenic fever 07/20/2019    Chronic obstructive pulmonary disease with acute lower  respiratory infection 07/20/2019    Neutropenia associated with infection 07/21/2019    Chemotherapy-induced neutropenia 07/21/2019    Chemotherapy follow-up examination 07/25/2019    Preop cardiovascular exam 10/10/2019    Aortic arch atherosclerosis 06/11/2020    Abnormal stress test 08/05/2020    Dysphagia 01/25/2021    Stage 3 severe COPD by GOLD classification 10/25/2018    Secondary malignant neoplasm of unspecified site (CODE) 02/01/2022    Orthostatic hypotension 04/06/2022    Chronic respiratory failure with hypoxia, on home O2 therapy 03/21/2023    TIA (transient ischemic attack) 05/21/2024     Past Medical History:   Diagnosis Date    Acute respiratory failure with hypoxia and hypercapnia 11/29/2017    Angina pectoris     Arthritis     Bell's palsy     Breast cancer     CAD (coronary artery disease)     Cervical cancer     Chronic bronchitis     COPD (chronic obstructive pulmonary disease)     Dental bridge present     Emphysema of lung     History of Bell's palsy     History of heart artery stent     Hyperlipidemia     Lung cancer     Myocardial infarction     Pneumonia     Pneumonia due to other staphylococcus     Sleep apnea     Vaginal delivery        Past Surgical History  She has a past surgical history that includes Cervix surgery; Breast surgery; Tonsillectomy; Adenoidectomy; sweat glands axillary regions (Bilateral); Colonoscopy (N/A, 3/17/2017); Breast biopsy (Right); Colonoscopy (N/A, 6/30/2017); Portacath placement (Right, 01/2018); Colonoscopy (N/A, 11/28/2018); Colonoscopy (N/A, 1/29/2019); Eye surgery (Bilateral, 06/08/2018); Breast lumpectomy; Left heart catheterization (Left, 8/13/2020); Esophagogastroduodenoscopy (N/A, 1/25/2021); Coronary angioplasty with stent; Colonoscopy (N/A, 7/26/2022); Esophagogastroduodenoscopy (N/A, 11/8/2022); Esophageal manometry with measurement of impedance (N/A, 7/10/2023); and Esophagogastroduodenoscopy (N/A, 9/19/2023).    Past Surgical History:    Procedure Laterality Date    ADENOIDECTOMY      BREAST BIOPSY Right     x3    BREAST LUMPECTOMY      BREAST SURGERY      lumpectomy right side     CERVIX SURGERY      cone    COLONOSCOPY N/A 3/17/2017    Procedure: COLONOSCOPY;  Surgeon: Julio Rudd MD;  Location: [facility] ENDO;  Service: Endoscopy;  Laterality: N/A;    COLONOSCOPY N/A 6/30/2017    Procedure: COLONOSCOPY;  Surgeon: Julio Rudd MD;  Location: [facility] ENDO;  Service: Endoscopy;  Laterality: N/A;    COLONOSCOPY N/A 11/28/2018    Procedure: COLONOSCOPY;  Surgeon: Nura Urbina MD;  Location: [facility] ENDO;  Service: Endoscopy;  Laterality: N/A;    COLONOSCOPY N/A 1/29/2019    Procedure: COLONOSCOPY;  Surgeon: Emmanuel Perez MD;  Location: [facility] ENDO;  Service: Endoscopy;  Laterality: N/A;  confirmed appt-SP    COLONOSCOPY N/A 7/26/2022    Procedure: COLONOSCOPY;  Surgeon: James Healy MD;  Location: [facility];  Service: Endoscopy;  Laterality: N/A;  fully vaccinated -  mediport in chest -     CORONARY ANGIOPLASTY WITH STENT PLACEMENT      ESOPHAGEAL MANOMETRY WITH MEASUREMENT OF IMPEDANCE N/A 7/10/2023    Procedure: MANOMETRY, ESOPHAGUS, WITH IMPEDANCE MEASUREMENT;  Surgeon: Miller Phillips MD;  Location: [facility] ([address] [place] [address]);  Service: Gastroenterology;  Laterality: N/A;  pt on oxygen 2.5L  pt requested Tuesday only  5/31 referred by Dr. Miller Phillips/instr. mailed-st  7/3-r/s, updated instructions reviewed with pt in detail via phone, pt verbalized understanding-KPvt    ESOPHAGOGASTRODUODENOSCOPY N/A 1/25/2021    Procedure: EGD (ESOPHAGOGASTRODUODENOSCOPY);  Surgeon: Dmitry Gonzalez MD;  Location: [facility];  Service: Endoscopy;  Laterality: N/A;  rapid test >50 miles -ml    ESOPHAGOGASTRODUODENOSCOPY N/A 11/8/2022    Procedure: EGD (ESOPHAGOGASTRODUODENOSCOPY);  Surgeon: Tapan Stevenson MD;  Location: [facility];  Service: Endoscopy;  Laterality: N/A;  w/dilation  fully vaccinated, instructions mailed-[facility]  11/4 pt called did not receive  instructions, does not have portal or email, went over prep instructions and medication instructions with pt on the phone -LW    ESOPHAGOGASTRODUODENOSCOPY N/A 9/19/2023    Procedure: EGD (ESOPHAGOGASTRODUODENOSCOPY);  Surgeon: Miller Phillips MD;  Location: [facility] ENDO;  Service: Endoscopy;  Laterality: N/A;  botox injection  inst mailed    EYE SURGERY Bilateral 06/08/2018    cataract     LEFT HEART CATHETERIZATION Left 8/13/2020    Procedure: Left heart cath, rra, noon;  Surgeon: Guevara Skelton MD;  Location: [facility] CATH LAB;  Service: Cardiology;  Laterality: Left;  RN PREOP 8/7/2020---COVID NEGATIVE ON 8/12    PORTACATH PLACEMENT Right 01/2018    sweat glands axillary regions Bilateral     TONSILLECTOMY          Family History  Her family history includes Breast cancer in her mother; COPD in her sister; Cancer in her father, maternal grandfather, maternal grandmother, mother, paternal grandfather, paternal grandmother, sister, and sister; Heart attack in her sister; Heart disease in her maternal grandfather, maternal grandmother, mother, and sister; Kidney cancer in her son.    Social History  She reports that she quit smoking about 7 years ago. Her smoking use included cigarettes. She started smoking about 57 years ago. She has a 12.5 pack-year smoking history. She has been exposed to tobacco smoke. She has never used smokeless tobacco. She reports that she does not drink alcohol and does not use drugs.    Allergies  She has No Known Allergies.    Medications  She has a current medication list which includes the following prescription(s): acetaminophen, albuterol, albuterol, aspirin, fluticasone propionate, trelegy ellipta, gabapentin, isosorbide mononitrate, levocetirizine, metoprolol tartrate, montelukast, pantoprazole, rosuvastatin, azelastine, diclofenac, methocarbamol, neomycin-polymyxin-hydrocortisone, nitroglycerin, and ofloxacin.    ROS:  Pertinent positive and negative review of systems as noted in  "HPI.     Objective:     /75   Pulse (!) 50   Ht 5' 3" (1.6 m)   Wt 71.6 kg (157 lb 13.6 oz)   LMP  (LMP Unknown)   BMI 27.96 kg/m²    Physical Exam  Constitutional: Well appearing, communicating. No acute distress  Voice: Euphonic  Eyes: Conjunctiva WNL, Pupils reactive  Head/Face: House Brackmann I Bilaterally.  Right Ear:    Auricle normally developed   EAC: normal   Tympanic membrane: intact and myringosclerosis present   Middle Ear: No effusion present and Ossicles in normal position  Left Ear:    Auricle normally developed   EAC: normal   Tympanic membrane: intact thick, outer rim of myringosclerosis present   Middle Ear: No effusion present and Ossicles in normal position  Vestibular:    Spontaneous Nystagmus: none  Nose:    Septum Midline Anteriorly   mild edematous turbinate hypertrophy   no rhinorrhea present  Tenderness to sinus palpation in not present  Oral Cavity:   No masses or lesions present     Oropharynx:   No masses or lesions within oropharynx. Tonsillar fossas symmetric. No erythema.   Cobblestoning absent  Neuro/Psychiatric:     Affect: Appropriate  Respiratory: No increased WOB, no stridor       Data Review:   LABS      IMAGING    No pertinent imaging available    AUDIO  08/20204  d:    2024    I have independently reviewed the following audiogram and reviewed the report. Findings as follows:     Tympanometry  Right: Type B  Left: Type B      Procedures:   Procedure: Tympanostomy Tubes (22994)     Pre procedure Diagnosis: eustachian tube dysfunction     Post procedure Diagnosis: same     Instrument: Binocular Microscope     Anesthesia: Topical Phenol    Side: right     Procedure: After verbal consent was obtained, the patient was laid supine in the small procedure room. A microscope was used to examine the ear. Instrumentation and suction were used to remove any cerumen, debris or blood from the EAC. The TM was anesthestized with phenol. A myringotomy blade was used to make an " incision in the posterior inferior quadrant. A PE tube was then placed into the TM securely. The patient tolerated the procedure well and without any acute complication. Findings below.      Findings:               Right Ear:              PE tube placed in the posteroinferior quadrant   serous fluid suctioned from middle ear     Assessment:     1. Right chronic serous otitis media    2. Type b tympanogram, bilateral    3. Dysfunction of right eustachian tube    4. Mixed conductive and sensorineural hearing loss of left ear with restricted hearing of right ear        Plan:     After long discussion of the risks benefits and alternatives for her obstructive Eustachian tube dysfunction refractory to medical management, I have offered PE tube placement on the right side.  We discussed that this has a second-line and temporary treatment for poor middle ear ventilation. Risks include bleeding, infection, ear drainage, perforation of the eardrum, cholesteatoma requiring surgical repair, scarring of the eardrum, early extrusion of the tube, failure of the tube to extrude, need to surgically remove the tube or persistent aural fullness.    Ciprodex drops 7 days  RTC in 6 weeks for tube check    Voice recognition software was used in the creation of this note/communication and any sound-alike errors which may have occurred from its use should be taken in context when interpreting. If such errors prevent a clear understanding of the note/communication, please contact the office for clarification.   Problem List Items Addressed This Visit    None  Visit Diagnoses       Right chronic serous otitis media    -  Primary    Type b tympanogram, bilateral        Dysfunction of right eustachian tube        Mixed conductive and sensorineural hearing loss of left ear with restricted hearing of right ear

## 2024-12-24 ENCOUNTER — HOSPITAL ENCOUNTER (OUTPATIENT)
Dept: RESPIRATORY THERAPY | Facility: HOSPITAL | Age: 79
Discharge: HOME | End: 2024-12-24
Attending: INTERNAL MEDICINE
Payer: MEDICARE

## 2024-12-24 ENCOUNTER — TELEPHONE (OUTPATIENT)
Dept: PULMONOLOGY | Facility: CLINIC | Age: 79
End: 2024-12-24
Payer: MEDICARE

## 2024-12-24 VITALS — OXYGEN SATURATION: 99 % | HEART RATE: 49 BPM | RESPIRATION RATE: 18 BRPM

## 2024-12-24 DIAGNOSIS — J44.9 CHRONIC OBSTRUCTIVE PULMONARY DISEASE, UNSPECIFIED COPD TYPE: ICD-10-CM

## 2024-12-24 LAB
BRPFT: NORMAL
DLCO ADJ PRE: 7.98 ML/(MIN*MMHG)
DLCO SINGLE BREATH LLN: 13.75
DLCO SINGLE BREATH PRE REF: 40.7 %
DLCO SINGLE BREATH REF: 19.49
DLCOC SBVA LLN: 2.61
DLCOC SBVA PRE REF: 76.2 %
DLCOC SBVA REF: 4.09
DLCOC SINGLE BREATH LLN: 13.75
DLCOC SINGLE BREATH PRE REF: 40.9 %
DLCOC SINGLE BREATH REF: 19.49
DLCOVA LLN: 2.61
DLCOVA PRE REF: 75.8 %
DLCOVA PRE: 3.09 ML/(MIN*MMHG*L)
DLCOVA REF: 4.09
DLVAADJ PRE: 3.11 ML/(MIN*MMHG*L)
ERVN2 LLN: -16449.48
ERVN2 PRE REF: 46.3 %
ERVN2 PRE: 0.24 L
ERVN2 REF: 0.52
FEF 25 75 CHG: 4.8 %
FEF 25 75 LLN: 0.87
FEF 25 75 POST REF: 9.6 %
FEF 25 75 PRE REF: 9.2 %
FEF 25 75 REF: 2.27
FET100 CHG: 6.2 %
FEV1 CHG: -7.8 %
FEV1 FVC CHG: 5.1 %
FEV1 FVC LLN: 63
FEV1 FVC POST REF: 53.6 %
FEV1 FVC PRE REF: 51 %
FEV1 FVC REF: 77
FEV1 LLN: 1.35
FEV1 POST REF: 32.2 %
FEV1 PRE REF: 34.9 %
FEV1 REF: 1.91
FRCN2 LLN: 1.84
FRCN2 PRE REF: 88.3 %
FRCN2 REF: 2.66
FVC CHG: -12.3 %
FVC LLN: 1.77
FVC POST REF: 59.4 %
FVC PRE REF: 67.7 %
FVC REF: 2.5
IVC PRE: 1.33 L
IVC SINGLE BREATH LLN: 1.77
IVC SINGLE BREATH PRE REF: 53 %
IVC SINGLE BREATH REF: 2.5
PEF CHG: -9.5 %
PEF LLN: 3.17
PEF POST REF: 31.8 %
PEF PRE REF: 35.2 %
PEF REF: 4.83
POST FEF 25 75: 0.22 L/S
POST FET 100: 14.78 SEC
POST FEV1 FVC: 41.45 %
POST FEV1: 0.62 L
POST FVC: 1.49 L
POST PEF: 1.54 L/S
PRE DLCO: 7.93 ML/(MIN*MMHG)
PRE FEF 25 75: 0.21 L/S
PRE FET 100: 13.91 SEC
PRE FEV1 FVC: 39.43 %
PRE FEV1: 0.67 L
PRE FRC N2: 2.35 L
PRE FVC: 1.69 L
PRE PEF: 1.7 L/S
RVN2 LLN: 1.57
RVN2 PRE REF: 98.4 %
RVN2 PRE: 2.11 L
RVN2 REF: 2.14
RVN2TLCN2 LLN: 35.89
RVN2TLCN2 PRE REF: 138.5 %
RVN2TLCN2 PRE: 62.99 %
RVN2TLCN2 REF: 45.48
TLCN2 LLN: 3.78
TLCN2 PRE REF: 70.2 %
TLCN2 PRE: 3.35 L
TLCN2 REF: 4.77
VA PRE: 2.58 L
VA SINGLE BREATH LLN: 4.62
VA SINGLE BREATH PRE REF: 55.9 %
VA SINGLE BREATH REF: 4.62
VCMAXN2 LLN: 1.77
VCMAXN2 PRE REF: 49.5 %
VCMAXN2 PRE: 1.24 L
VCMAXN2 REF: 2.5

## 2024-12-24 PROCEDURE — 94200 LUNG FUNCTION TEST (MBC/MVV): CPT | Mod: HCNC

## 2024-12-24 PROCEDURE — 94729 DIFFUSING CAPACITY: CPT | Mod: HCNC

## 2024-12-24 PROCEDURE — 25000242 PHARM REV CODE 250 ALT 637 W/ HCPCS: Mod: HCNC | Performed by: INTERNAL MEDICINE

## 2024-12-24 PROCEDURE — 94618 PULMONARY STRESS TESTING: CPT | Mod: HCNC

## 2024-12-24 RX ORDER — ALBUTEROL SULFATE 2.5 MG/.5ML
2.5 SOLUTION RESPIRATORY (INHALATION) ONCE
Status: COMPLETED | OUTPATIENT
Start: 2024-12-24 | End: 2024-12-24

## 2024-12-24 RX ADMIN — ALBUTEROL SULFATE 2.5 MG: 2.5 SOLUTION RESPIRATORY (INHALATION) at 07:12
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
## 2025-01-15 ENCOUNTER — INFUSION (OUTPATIENT)
Dept: INFUSION THERAPY | Facility: HOSPITAL | Age: 80
End: 2025-01-15
Attending: INTERNAL MEDICINE
Payer: MEDICARE

## 2025-01-15 VITALS
RESPIRATION RATE: 16 BRPM | SYSTOLIC BLOOD PRESSURE: 109 MMHG | TEMPERATURE: 98 F | OXYGEN SATURATION: 95 % | DIASTOLIC BLOOD PRESSURE: 58 MMHG | HEART RATE: 58 BPM

## 2025-01-15 DIAGNOSIS — G62.9 NEUROPATHY: Primary | ICD-10-CM

## 2025-01-15 DIAGNOSIS — C50.919 RECURRENT BREAST CANCER, UNSPECIFIED LATERALITY: ICD-10-CM

## 2025-01-15 DIAGNOSIS — G62.9 NEUROPATHY: ICD-10-CM

## 2025-01-15 DIAGNOSIS — C34.90 SQUAMOUS CELL CARCINOMA LUNG: Primary | ICD-10-CM

## 2025-01-15 DIAGNOSIS — E83.42 HYPOMAGNESEMIA: ICD-10-CM

## 2025-01-15 LAB
ALBUMIN SERPL BCP-MCNC: 3.9 G/DL (ref 3.5–5.2)
ALP SERPL-CCNC: 54 U/L (ref 40–150)
ALT SERPL W/O P-5'-P-CCNC: 12 U/L (ref 10–44)
ANION GAP SERPL CALC-SCNC: 7 MMOL/L (ref 8–16)
AST SERPL-CCNC: 15 U/L (ref 10–40)
BASOPHILS # BLD AUTO: 0.05 K/UL (ref 0–0.2)
BASOPHILS NFR BLD: 0.9 % (ref 0–1.9)
BILIRUB SERPL-MCNC: 0.6 MG/DL (ref 0.1–1)
BUN SERPL-MCNC: 17 MG/DL (ref 8–23)
CALCIUM SERPL-MCNC: 9.5 MG/DL (ref 8.7–10.5)
CHLORIDE SERPL-SCNC: 104 MMOL/L (ref 95–110)
CK SERPL-CCNC: 71 U/L (ref 20–180)
CO2 SERPL-SCNC: 26 MMOL/L (ref 23–29)
CREAT SERPL-MCNC: 0.8 MG/DL (ref 0.5–1.4)
DIFFERENTIAL METHOD BLD: ABNORMAL
EOSINOPHIL # BLD AUTO: 0.1 K/UL (ref 0–0.5)
EOSINOPHIL NFR BLD: 1.3 % (ref 0–8)
ERYTHROCYTE [DISTWIDTH] IN BLOOD BY AUTOMATED COUNT: 13.4 % (ref 11.5–14.5)
EST. GFR  (NO RACE VARIABLE): >60 ML/MIN/1.73 M^2
GLUCOSE SERPL-MCNC: 108 MG/DL (ref 70–110)
HCT VFR BLD AUTO: 40.8 % (ref 37–48.5)
HGB BLD-MCNC: 13 G/DL (ref 12–16)
IMM GRANULOCYTES # BLD AUTO: 0.02 K/UL (ref 0–0.04)
IMM GRANULOCYTES NFR BLD AUTO: 0.4 % (ref 0–0.5)
LYMPHOCYTES # BLD AUTO: 1.4 K/UL (ref 1–4.8)
LYMPHOCYTES NFR BLD: 24.3 % (ref 18–48)
MAGNESIUM SERPL-MCNC: 1.7 MG/DL (ref 1.6–2.6)
MCH RBC QN AUTO: 29.1 PG (ref 27–31)
MCHC RBC AUTO-ENTMCNC: 31.9 G/DL (ref 32–36)
MCV RBC AUTO: 92 FL (ref 82–98)
MONOCYTES # BLD AUTO: 0.5 K/UL (ref 0.3–1)
MONOCYTES NFR BLD: 8.1 % (ref 4–15)
NEUTROPHILS # BLD AUTO: 3.6 K/UL (ref 1.8–7.7)
NEUTROPHILS NFR BLD: 65 % (ref 38–73)
NRBC BLD-RTO: 0 /100 WBC
PLATELET # BLD AUTO: 207 K/UL (ref 150–450)
PMV BLD AUTO: 10.7 FL (ref 9.2–12.9)
POTASSIUM SERPL-SCNC: 3.8 MMOL/L (ref 3.5–5.1)
PROT SERPL-MCNC: 6.8 G/DL (ref 6–8.4)
RBC # BLD AUTO: 4.46 M/UL (ref 4–5.4)
SODIUM SERPL-SCNC: 137 MMOL/L (ref 136–145)
WBC # BLD AUTO: 5.59 K/UL (ref 3.9–12.7)

## 2025-01-15 PROCEDURE — 83735 ASSAY OF MAGNESIUM: CPT | Mod: HCNC | Performed by: INTERNAL MEDICINE

## 2025-01-15 PROCEDURE — 80053 COMPREHEN METABOLIC PANEL: CPT | Mod: HCNC | Performed by: INTERNAL MEDICINE

## 2025-01-15 PROCEDURE — 82550 ASSAY OF CK (CPK): CPT | Mod: HCNC | Performed by: INTERNAL MEDICINE

## 2025-01-15 PROCEDURE — 85025 COMPLETE CBC W/AUTO DIFF WBC: CPT | Mod: HCNC | Performed by: INTERNAL MEDICINE

## 2025-01-15 PROCEDURE — 36592 COLLECT BLOOD FROM PICC: CPT | Mod: HCNC

## 2025-01-15 RX ORDER — HEPARIN 100 UNIT/ML
500 SYRINGE INTRAVENOUS
Status: DISCONTINUED | OUTPATIENT
Start: 2025-01-15 | End: 2025-01-15 | Stop reason: HOSPADM

## 2025-01-15 NOTE — PLAN OF CARE
Patient ambulated onto unit for labs, no s/s of distress, VSS. Plan of care reviewed with patient. Port accessed, Labs drawn:cbc, cmp, cpk, magnesium. Port heparin locked and deaccessed. Patient tolerated treatment well. Next appt reminder given, patient ambulated off unit, no s/s of distress.

## 2025-01-21 ENCOUNTER — TELEPHONE (OUTPATIENT)
Dept: CARDIOLOGY | Facility: CLINIC | Age: 80
End: 2025-01-21
Payer: MEDICARE

## 2025-01-21 NOTE — TELEPHONE ENCOUNTER
----- Message from David sent at 1/21/2025  8:42 AM CST -----  Regarding: Scheduling Request  Contact: [phone]  Scheduling Request           Appt Type:    2 mo     Date/Time Preference: As soon as available     Treating Provider: Dr. Mg Perez Name: Ade Castellano     Contact Preference:  [phone]     Comments/notes:

## 2025-01-22 ENCOUNTER — OFFICE VISIT (OUTPATIENT)
Dept: HEMATOLOGY/ONCOLOGY | Facility: CLINIC | Age: 80
End: 2025-01-22
Payer: MEDICARE

## 2025-01-22 DIAGNOSIS — R91.1 PULMONARY NODULE: ICD-10-CM

## 2025-01-22 DIAGNOSIS — J44.9 CHRONIC OBSTRUCTIVE PULMONARY DISEASE, UNSPECIFIED COPD TYPE: ICD-10-CM

## 2025-01-22 DIAGNOSIS — C50.919 RECURRENT BREAST CANCER, UNSPECIFIED LATERALITY: Primary | ICD-10-CM

## 2025-01-22 DIAGNOSIS — Z85.118 HISTORY OF LUNG CANCER: ICD-10-CM

## 2025-01-22 NOTE — PROGRESS NOTES
Audio Only Telehealth Visit     The patient location is: home  The chief complaint leading to consultation is:  f/u breast ca  Visit type: Virtual visit with audio only (telephone)  Total time spent with patient: 15 min      The reason for the audio only service rather than synchronous audio and video virtual visit was related to technical difficulties or patient preference/necessity.     Each patient to whom I provide medical services by telemedicine is:  (1) informed of the relationship between the physician and patient and the respective role of any other health care provider with respect to management of the patient; and (2) notified that they may decline to receive medical services by telemedicine and may withdraw from such care at any time. Patient verbally consented to receive this service via voice-only telephone call.         This service was not originating from a related E/M service provided within the previous 7 days nor will  to an E/M service or procedure within the next 24 hours or my soonest available appointment.  Prevailing standard of care was able to be met in this audio-only visit.           Subjective:       Patient ID: Ade Castellano is a 79 y.o. female.    Chief Complaint: No chief complaint on file.  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 The PET(+) disease in the right axilla and right supraclavicular fossa will be boosted  at 200 cGy per fraction to 6000 cGy total dose.  S/p LYMPH NODE, RIGHT AXILLA, BIOPSY: 6/16/21 . Metastatic poorly-differentiated carcinoma, c/w breast primary ERnegPRneg Her2 neg  PD-L1 (22C3) IHC CPS> is greater than or equal to 10  Pembrolizumab 7/22/21 -6/15/22         Prior Hx:  80 y/o female with history of  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  And Rt  breast Haja/p  cycle 6 of carboplatin/Abraxane 7/2/2018   She has  History of Stage 1A  Rt breast cancer Grade 3, ER/AZ neg , Her 2 jose maria neg s/p lumpectomy 7/11/2014 and s/p adjuvant RT 9/2014 Pt declined Adjuvant chemo.Pathology showed a 1.15 cm, grade 3 infiltrating ductal carcinoma.  One sentinel lymph node was negative for malignancy.  Margins were negative and the closest margin was 1 cm.  She was staged  pT1c  pN0 cM0 stage IA.  She declined adjuvant chemo therapy.  She completed post operative radiation therapy at 400 cGy per fraction to 4000 cGy 9/2014  Pt hospitalized 11/2017 for  acute on chronic respiratory failure requiring intubation and ventilation. Has large lung mass in right lung with probable post obstructive pneumonia resulting in COPD exacerbation.CTA chest 11/29/2017 revealed   Large right hilar mass with involvement of adjacent segmental pulmonary arterial branches and bronchi with associated postobstructive atelectasis and volume loss in the RML  with adjacent ground glass opacity concerning for underlying neoplastic process. Mediastinal and axillary adenopathy, with a right axillary lymph node measuring up to 2.0 cm in short axis diameter.She underwent rt axillary LN bx at outside facility- on 1/16/2018 at [facility]. Pathology revealed metastatic poorly differentiated carcinoma of unknown primary site. She next underwent lung bx at [facility] 1/31/2018  benign lung tissue. Repeat Right Lung Bx 2/14/2018 Pathology reveals Squamous cell carcinoma PD-L1 10% low expression EGFR NEG ALK NEG BRAF NEG. Outside slides axillary LN specimen were  reviewed/comparison to lung bx findings . She completed    cycle 6 of carboplatin/Abraxane completed 7/2018 .PET/CT  4/19/2018 revealed there has been a excellent response to therapy.  At least 90% reduction in malignant activity.PET/CT 2/21/2019 increased size and irregularity of the right axillary lymph node with increased FDG avidityMAMMO/US rt breast 2/2019 revealed suspicious findings rt axilla LN.  Right axilla, mass, core biopsy: Positive for poorly differentiated carcinoma breast primary ER neg/NM neg Her2 neg.[facility] reviewIt was determined  the lung tumor corresponds to a squamous cellcarcinoma and appears to be unrelated to the invasive ductal carcinoma of the breast. The axillary mass, in myopinion, corresponds to metastases of the breast primary. Although it is a poorly differentiated carcinoma, theimmunophenotype is most consistent with the one that the breast primary exhibited, and is inconsistent with metastatic squamous cell carcinoma. She was treated with  AC ( adriamycin 60m/m2/Cytoxan 600mg/m2)  q21d x 4 cycles completed 9/6/2019 . PET/CT 9/19/2019 shows disease response l decrease in size and uptake of several right axillary lymph nodes and a supraclavicular lymph node.  Mild residual activity may indicate viable tumor.Pt followed by Rad/Onc. She was presented at [facility] tumor board and it was determined to proceed Radiation therapy only. No ALND due to concurrent lung and supraclavicular node. She  completed  RT 12/16/2019  To right axilla and right supraclavicular region PET/CT imaging  5/20/21 shows Continued increase in both size and hypermetabolic activity of right axillary level 1 lymph nodes a new, mildly hypermetabolic cutaneous thickening of the right breast. she underwent LYMPH NODE, RIGHT AXILLA, BIOPSY: 6/16/21 . Pathology showed Metastatic poorly-differentiated carcinoma, consistent with breast primary-ERneg/ PRneg  HER2  neg  Pt started on  pembrolizumab 7/2021  Pt hospitalized 4/6/22 with hypokalemia and orthostatic hypotension  S/p C16 pembrolizumab 6/15/22  ( 400mg q6wks)  hospitalized with  dizziness and possible TIA   Patients etiology appears to be secondary to small vessel disease. Recommendations were to continue Aspirin 81 mg daily, plavix 75 mg for 21 days followed by ASA 81 mg monotherapy thereafter.     Interval Hx:   Followed by Vascular surgery   She was recently prescribed neurontin with improvement in pain in feet  She continues with mild LOGAN-improved   She is now f/b pulmonology, Dr. Masterson chronic hypoxemic respiratory failure 2/2 copd   She continues with chronic LBP , stable  Appetite and weight stable  She has supp 02 at home  Cont to wear 02 at night  She has chronic SUNITHA , untreated, declines titration study                PrevHx:She had an abnormal mammogram 6/4/2014 whichRevealed a round solid mass 6mm in rt breast.She then underwent U/S guided core bx of rt breast mass on 6/17/2014.Pathology revealed infiltrating ductal carcinoma Grade 3 with tumorPresent in thin-walled spaces suggestive of lymphatic spaces. HormoneReceptor status on tumor specimen revealed ER negative 0% ,[place] negative 0% and Her 2 jose maria negative.She subsequently underwent rt segmental mastectomy and SLN bxOn 7/11/2014. Pathology of rt breast lumpectomy revealed invasiveDuctal carcinoma with micropapillary pattern ( Invasive micropapillaryCa) with max tumor dimension 11.5mm with suggestion of tumor in Thin walled spaces c/w lymphovascular involvement. SurgicalMargins free of tumor, grade 3, ER/[place] neg , Her 2 jose maria neg With [place] Lymph node negative for Neoplasm. Pathologic staging [id number](i-)Her mother was diagnosed with breast cancer in her 50's/        Review of Systems   Constitutional: Positive for mild  fatigue, stable No  activity change,  fever.   HENT:  Negative for mouth sores, rhinorrhea.  Eyes: Negative for visual disturbance.   Respiratory: Positive  for chronic  LOGAN,Negative for wheezing.    Cardiovascular: Negative for chest pain.   Gastrointestinal:  Negative for abdominal pain and diarrhea.   Genitourinary: Negative for frequency.   Musculoskeletal: Positive for chronic LBP, stable   Skin: Negative for rash.   Neurological: Negative for dizziness and headaches.   Hematological: Negative for adenopathy.   Psychiatric/Behavioral: The patient is not nervous/anxious.        Objective:        There were no vitals filed for this visit.              CT a/p w/contrast 11/29/2018   1. No acute abnormality identified within the abdomen and pelvis.    2.  There are a few nonspecific prominent lymph nodes in the upper abdomen including a periesophageal lymph node which measures 1.0 cm in short axis diameter.    3.  Significant abdominal aortic atherosclerosis and abdominal aorta ectasia.    4.  Additional findings as above.    PET/CT 1/26/2018   1.  Intense FDG uptake within the known right infrahilar lung mass, compatible with malignancy.  It is unclear if this represents primary lung malignancy or metastatic breast cancer.    2.  Intensely hypermetabolic right axillary, retropectoral, and lower right cervical lymph nodes, compatible with metastases.    3.  Abnormal FDG uptake along right breast skin thickening, new from prior chest CTA and mammogram.  This may relate to localized edema/inflammation, though correlation with physical exam and mammography are recommended to exclude underlying inflammatory carcinoma.      MRI Brain w w/out contrast 2/9/2018  1.  No evidence of intracranial metastases.  2.  Sinus disease       Rt axillary LN bx at outside facility- on 1/16/2018 at [facility]  Pathology revealed metastatic poorly differentiated carcinoma of unknown primary  site 1/16/2018 at [facility]  TTF-1 negative, napsin negative  , cytokeratin 7 positive, cytokeratin 20 negative, p63 negative, cytokeratin 5/6 focal dim positivity    LUNG  BIOPSY 1/31/2018  Pathology revealed benign lung tissue         SPECIMEN  1) Right Lung Bx 2/14/2018  Supplemental Diagnosis  Immunohistochemical stains show strong nuclear staining for p63 in essentially all tumor cells and very strong  cytoplasmic and membrane staining for CK5/6, also in essentially all tumor cells. TTF-1 and CK7 are negative  within tumor cells but do stain native pulmonary elements present within the biopsy. A stain for mucicarmine is  negative. Positive and negative controls function appropriately.  Final diagnosis: Specimen submitted as right lung biopsy  -Squamous cell carcinoma  (Electronically Signed: 2018-02-20 09:12:07 )  Diagnosed by: Phong Thompson  FINAL PATHOLOGIC DIAGNOSIS  Fragments of pulmonary parenchyma (submitted as right lung biopsy):    FINAL PATHOLOGIC DIAGNOSIS 1. Lymph node, right axilla, biopsy, review of 10 outside slides [facility], JS 18 1 020,  collected January 11, 2018: Metastatic poorly differentiated carcinoma (see comment).  The histologic section shows fibrous stroma infiltrated by nest of poorly differentiated malignant cells including  occasional dyskeratotic cells morphologically suggestive of metastatic squamous cell carcinoma.  Immunohistochemical stains are nonspecific; the cells are positive for cytokeratin 7 and cytokeratin 5/6 (focal) and  negative for cytokeratin 20, TTF-1, p63, GCDFP, mammoglobin, and CEA        PET/CT 2/21/2019  Increased size and irregularity of the right axillary lymph node with increased FDG avidity.  Although this would be atypical in location for lung carcinoma, the increased size and avidity must be concerning for metastatic disease in this patient with history of squamous cell lung cancer.     US Rt breast and mammo 2/26/2019  Impression:  Right  Lymph Node: Right axilla lymph node. Assessment: 4 - Suspicious finding. Biopsy is recommended.      BI-RADS Category:   Right: 4 - Suspicious  Overall: 4 -  Suspicious     Recommendation:  Biopsy is recommended. Biopsy of the lymph node measuring 1.4 x 1.1 x 1.3 recommended , the one with the round shape configuration, corresponding to the most recent PET CT finding.         Pathology 3/11/2019   FINAL PATHOLOGIC DIAGNOSIS  Right axilla, mass, core biopsy:  Positive for poorly differentiated carcinoma.  See comment.  Comment:  The biopsy from the right axilla mass shows a poorly differentiated carcinoma in background lymphoid tissue  with enlarged cells showing increased nuclear size, prominent nucleoli, moderate amounts of cytoplasm and mitotic  figures. Immunostains are completed and reveal the tumor cells to stain positively with cytokeratin AE 1/AE3, CK  5/6 and CK 7. The tumor cells are negative for CK 20, Estrogen receptor, p63 and TTF-1. All stains have  satisfactory positive and negative controls. The patient's prior cases will be reviewed and an additional stain for  JUAREZ-3 is pending in an attempt to pinpoint the primary site of this malignancy and results will follow in a  supplemental report.      Supplemental Diagnosis  3/11/2019  The current axillary mass is compared to the patient's prior right lung biopsy (case number [id number]) and the  current axillary mass is morphologically similar to the lung malignancy. Also, the current axillary mass is compared  to the patient's prior breast resection (Case number [id number]) and appears similar as well. P63 is negative in the  [id number] tumor and CK 5/6 shows scattered positive staining in the [id number] tumor.  Immunostain for JUAREZ-3 is completed and shows only rare weak to moderate staining within the tumor cell nuclei  with satisfactory positive and negative controls. This stain is positive in the surrounding lymphocytes. This staining  pattern is non-specific and does not definitively differentiate between a lung and breast primary malignancy in this  right axilla mass biopsy. The staining profile of the  current axillary mass match more closely with the previous  breast carcinoma (due to the p63 negativity in both the 2014 breast cancer and the current axillary mass lesion but  p63 positivity in the lung mass from 2018).  This staining profile, together with the comparison with the patient's prior breast tumor and prior lung tumor supports  a diagnosis of poorly differentiated carcinoma and the malignancy appears morphologically similar to the prior  malignancies from both sites, but the staining profile in this small sample from the axillary mass more closely  correlates with the prior breast malignancy. Pathologic subclassification of this malignancy's primary location is not  definitive and clinical correlation is recommended definitively decide on possible primary location in this patient's  axillary mass sample.  Supplemental (2):  Additional immunohistochemical staining for progesterone receptor and HER2 are completed per clinician request  with following results:  Progesterone receptor: Negative; 0% nuclear tumor cell staining.  HER2: Negative; stain score = 0.  Supplemental (3):  [place] DIAGNOSIS:  FINAL DIAGNOSIS  Breast, right, needle core biopsy ([id number]; 06/17/2014): Invasive ductal carcinoma, [place] grade III (of  III), with focal micropapillary features.  Immunohistochemical stains performed at the referring institution show that the tumor cells have the following  phenotype: - Estrogen receptor: Negative (0% tumor cells staining). - Progesterone receptor: Negative (0% tumor  cells staining). - HER2: Negative (score 0).  Lymph nodes, right, sentinel biopsy and lumpectomy ([id number]; 07/11/2014):  1. Right sentinel lymph node: A single (1) lymph node is negative for metastatic carcinoma.  Immunohistochemical stains performed by the referring institution and reviewed at [facility] for cytokeratin are  negative, confirming the diagnosis.  2. Right breast: Invasive ductal carcinoma with  micropapillary features, per report 11.5 mm in greatest dimension.  Foci suspicious for lymphovascular invasion identified. Margins of resection are free of tumor.  Immunohistochemical stains performed by the referring institution and reviewed at [facility] show that the tumor  cells are negative for p63 and show patchy positivity for CK 5/6.  Lung, right, needle core biopsy ([id number]; 02/14/2018): Squamous cell carcinoma.    Immunohistochemical stains performed by the referring institution show the tumor cells are strongly positive for p63  and CK 5/6, while negative for TTF-1 and CK7. These result support the diagnosis. Axilla, right, needle core biopsy  ([id number]; 03/11/2019): Lymph node positive for metastatic carcinoma, most consistent with breast primary  (see comment).  Immunohistochemical stains performed by the referring institution and reviewed at [facility] show that the tumor  cells are negative for p63, focally positive for CK 5/6, focally and weakly positive for JUAREZ-3, and strongly positive  for CK7. They are also negative for estrogen receptor, progesterone receptor, and HER2 JAM (score 0).  COMMENT:  Thank you for allowing me to review the case of this 73-year-old lady who recently underwent needle core biopsies  of a right axillary mass and who has a previous diagnosis of an invasive ductal carcinoma of the right breast, as well  as a squamous cell carcinoma of the lung. I am reviewing this case because of my special interest in breast  pathology.  I agree with your interpretation of this case. In my opinion, the lung tumor corresponds to a squamous cell  carcinoma and appears to be unrelated to the invasive ductal carcinoma of the breast. The axillary mass, in my  opinion, corresponds to metastases of the breast primary. Although it is a poorly differentiated carcinoma, the  immunophenotype is most consistent with the one that the breast primary exhibited, and is inconsistent with  a  metastatic squamous cell carcinoma. I did share the case with Dr. Anabel Stone, one of our pulmonary pathologists,  and she concurs with this interpretation.  Note: Report attached.  Performing location:  [address] [address]      LYMPH NODE, RIGHT AXILLA, BIOPSY: 6/16/21   - Metastatic poorly-differentiated carcinoma, consistent with breast primary  -ER, CO, and HER2 immunohistochemical hormonal markers are also negative  PD-L1 (22C3) SemiQuant IHC, Manual  Interpretation  Right axillary lymph node , specimen for PD-L1 immunohistochemistry studies (clone 22C3, [employer]  [place], [place], [place]; using a proprietary detection system) ([id number]):  Reported carcinoma site of origin: Breast  Result: The percent of PD-L1 positive cells based upon the total number of tumor cells (combined positive  score, CPS) is greater than or equal to 10.  Interpretation: Studies suggest that positive PD-L1 immunohistochemistry in tumor cells and/or tumorassociated  immune cells may predict tumor response to therapy with immune checkpoint inhibitors. This  result should not be used as the sole factor in determining treatment, as other factors (for example, tumor  mutation burden, and microsatellite instability) have been also studied as predictive markers.          CT neck/chest/abd/pelvis w/contrast 4/26/2019   The previously described right hilar mass is no longer visible.  There is persistent volume loss in the right middle lobe this appears similar to the prior PET-CT February 21, 2019.    Persistent abnormal right axillary node today measuring about 15 mm compared 18 mm prior.  The positioning of the adjacent nodes is slightly different likely accounting for the slight difference in measurement.    Cluster of tree-in-bud nodular densities left lower lobe series 2, image 93.  This may be related to small airways disease though serial follow-up  suggested.      PET/CT 6/20/2019   New and worsening hypermetabolic lymph nodes concerning for metastatic breast cancer as described above.  Tissue sampling would be required for definitive diagnosis.      2-D echo 6/27/2019   Normal left ventricular systolic function. The estimated ejection fraction is 55%  Concentric left ventricular remodeling.  Normal LV diastolic function.  Normal right ventricular systolic function.  Moderate left atrial enlargement.  Mild right atrial enlargement.  Mild aortic regurgitation.  Mild mitral regurgitation.  Mild tricuspid regurgitation.  Normal central venous pressure (3 mm Hg).  The estimated PA systolic pressure is 25 mm Hg      PET/CT 9/19/2019   Favorable response to therapy with interval decrease in size and uptake of several right axillary lymph nodes and a supraclavicular lymph node.  Mild residual activity may indicate viable tumor.    No new hypermetabolic findings worrisome for malignancy.    PET/CT 2/28/2020  1.  No evidence of hypermetabolic tumor.  Continued improvement of the right axilla status post adjuvant radiation.    2.  No new hypermetabolic lesions      CTA 6/17/2020  1. No evidence of pulmonary embolus. No aortic dissection.   2. There is no evidence for new or progressive disease in the chest as compared to PET/CT 6/20/2019 in this patient with history of right breast cancer and right lung neoplasm. The patient does have a more recent PET/CT 2/28/2020 from an outside facility per the electronic medical record although images are not available for direct comparison. Correlation with these images may be beneficial. Continued long-term surveillance recommended.  3. Stable appearance of the treated tumor in the right hilum/middle lobe.  4. Stable treated right breast cancer and axillary adenopathy without evidence for developing breast mass or new right axillary adenopathy.  5. Stable tiny nodularity/tree-in-bud densities in the left lung, probably  postinfectious or inflammatory.  6. Wall thickening of the esophagus may be correlated for esophagitis. Possible tiny hiatus hernia.  7. Slight bronchial wall thickening may be correlated for bronchitis.  8. Mild to moderate emphysema as before.  9. Reflux of contrast into the IVC and hepatic veins is nonspecific but may be correlated for elevated right heart pressures.  10. Coronary calcifications and/or stents similar to prior.    Echo 5/21/2020  Technically difficult study.   Normal left ventricular systolic function.   Left ventricular ejection fraction is estimated at 55%.   Severe mitral annular calcification.   Mild mitral valve regurgitation.   Aortic valve sclerosis without stenosis or regurgitation.     PFTs 6/17/2020  Spirometry reveals a very severe obstructive lung defect, which does not significantly improve post bronchodilators. Lung volumes revealed air trapping. Diffusing capacity was moderately impaired.        Del 6/26/2020  BI-RADS Category:   Overall: 2 - Benign      PET/CT 10/23/2020   Impression:     Stable mildly hypermetabolic right axillary lymph node/nodular density contralateral to the site of injection.  Differential considerations include metastatic lymph node, reactive lymph node from benign right upper extremity process, and fat necrosis.  Recommend physical examination of the upper extremity and consideration of ultrasound for further evaluation of the node/nodule and possible image guided biopsy.  Otherwise, attention on follow-up.     Otherwise, no other hypermetabolic disease identified      Mammo diagnostic right /US 11/4/2020   Impression:   1. No suspicious finding either on mammogram or ultrasound at the site of the palpable lump in the right breast at 10:00 o'clock. A bilateral mammogram is recommended in June 2020 to return to the patient to annual screening.   2. Redemonstration of the morphologically abnormal lymph node in the right axilla that contains a biopsy clip,  compatible with the known recurrence metastatic breast cancer that has been treated with chemotherapy. The appearance of this lymph node is unchanged from 06/26/2020 and overall appears smaller when compared to 03/11/2019.     Abd US 12/11/2020   Impression:     1. Echogenic liver likely representing hepatic steatosis.  2. Right upper quadrant ultrasound otherwise is unremarkable.     PET/CT 10/14/21     Impression:     1.  No definite evidence of hypermetabolic tumor.  Interval resolution of hypermetabolic right axillary lymph nodes.  No new hypermetabolic findings.     2.  Persistent low-grade diffuse cutaneous uptake which is nonspecific.    MRI shoulder w w/out contrast 1/26/22   Impression:     Mild edema and postcontrast enhancement at the myotendinous junction of the supraspinatus and infraspinatus, it is nonspecific and could be due to degenerative change or tendinosis.     Small area of interstitial tear at the footprint insertion of the infraspinatus tendon.     No large rotator cuff tear.     No advanced degenerative change.     No osseous lesions.     PET/CT 1/26/22  Impression:In this patient with breast cancer, there is no evidence of hypermetabolic tumor to suggest recurrent or metastatic disease.   Less extensive but, nonspecific, low-grade diffuse cutaneous uptake of the right breast.       PET/CT 4/27/22  Impression:     1.  No FDG avid disease to suggest recurrence or metastatic disease.     Unchanged right breast skin thickening with mild FDG uptake.       PET/CT 7/13/22   Impression:     In this patient with lung and breast cancer, there is new left lower lobe opacification with mild radiotracer uptake which may represent aspiration, pneumonia, or malignancy.  Tissue sampling would be required for definitive diagnosis.     Unchanged right breast skin thickening with mild radiotracer uptake.    CT chest w/contrast 8/25/22    Decreasing patchy pulmonary consolidation within the left lower lobe  when compared to prior PET-CT scan dated 07/13/2022.  The overall appearance of the patchy consolidation as well as the moderate improvement when compared to the prior study suggest a benign etiology such as left lower lobe pneumonia.     Persistent band of atelectasis/scarring within the right middle lobe, stable dating back to 04/26/2019.     Coronary artery atherosclerosis in a multi vessel distribution.     There are no measurable lesions per RECIST criteria.    PET /CT  11/21/22 shows New right lower lobe infiltrate worrisome for pneumonia or aspiration.Chronic infiltrate right middle lobe.   Resolution of the left lower lobe infiltrate.    Mammo 7/26/22  BI-RADS Category:   Overall: 2 - Benign        CT chest with contrast 11/21/22    Impression:     New right lower lobe infiltrate worrisome for pneumonia or aspiration.     Chronic infiltrate right middle lobe.     Resolution of the left lower lobe infiltrate.     Coronary artery calcifications.         PET/CT 1/11/23  Impression:     1. In this patient with lung and breast cancer, there is stable right breast skin thickening with mild radiotracer uptake.  2. Interval resolution of hypermetabolic opacification in the left lower lobe as compared to FDG PET-CT 07/14/2022.  Interval improvement in patchy opacities in the right lower lobe as compared to CT 11/21/2022    .                Electronically Signed By: Tapan Young MD 4/16/2023 23:17 CDT, [facility]  Narrative    PET/CT 4/14/23  [facility]  HISTORY:       Malignant neoplasm of female breast, unspecified estrogen receptor status, unspecified laterality, unspecified site of breast (CMS/HCC).       ICD10:  C50.919   Malignant neoplasm of female breast, unspecified estrogen receptor status, unspecified laterality, unspecified site of breast (CMS/HCC)       REFERENCE EXAMS:     7/13/2022 PET CT ([facility]) (no images, report only)     6/20/2019 PET CT ([facility])       TECHNIQUE:      PET/CT with attenuation correction.     Dose:  13.62 mCi of FDG-18     Injection site:  left wrist     Field of view:  Skull base to thighs.     Arm position:  above head     Baseline glucose:  128 mg/dL       FINDINGS:       All SUV values are max SUV.     Head/Neck:     Physiologic uptake of radiotracer.         Thorax:     Right portacath.       Cutaneous thickening in the right breast (SUV=1.58).       Calcification of the mitral valve, similar to 6/20/2019.       Coronary artery atherosclerotic calcification.     Atelectasis/scarring along the inferior aspect of the right major fissure, similar to 6/20/2019.         Abdomen/Pelvis:     Physiologic uptake in the genitourinary system.     Physiologic uptake in the intestines.       Patchy atherosclerotic calcification in the aorta and iliac arteries.       Impression      No opacity in the left lower lobe on the current exam to correspond to a left lower lobe opacity described on the 7/13/2022 PET CT report.  Images from the 7/13/2022 PET CT are not available at the time of this report.       Cutaneous thickening in the right breast with mild uptake.  There was a similar finding described on the 71/3/2022 PET CT report.           Electronically Signed By: Tapan Young MD 4/16/2023 23:17 CDT, [facility]          Mammo screening bilateral 8/7/23  BI-RADS Category: Overall: 2 - Benign     PET /CT ( NOT DOTATATE) 4/14/23 No opacity in the left lower lobe on the current exam to correspond to a left lower lobe opacity described on the 7/13/2022 PET CT report.  Images from the 7/13/2022 PET CT are not available at the time of this report.   Cutaneous thickening in the right breast with mild uptake.  There was a similar finding described on the 71/3/2022 PET CT report.       PET/CT 11/14/23    Impression:     Similar appearing right breast skin thickening with mild hypermetabolic activity.  No new focal abnormal tracer uptake  elsewhere.      EXAMINATION: 3/19/24   NM PET CT FDG SKULL BASE TO MID THIGH     CLINICAL HISTORY:  Breast cancer, invasive, stage IV, assess treatment response; Malignant neoplasm of unspecified site of unspecified female breast     TECHNIQUE:  12.8 mCi of F18-FDG was administered intravenously in the left antecubital fossa.  After an approximately 60 min distribution time, PET/CT images were acquired from the skull base to mid thigh.  Transmission images were acquired to correct for attenuation using a whole body low-dose CT scan without IV contrast with the arms positioned above the head. Glycemia at the time of injection was 116 mg/dL.     COMPARISON:  NM PET-CT 11/14/2023, 01/11/2023     FINDINGS:  Quality of the study: Adequate.     In the head and neck, there are no hypermetabolic lesions worrisome for malignancy. There are no hypermetabolic mucosal lesions, and there are no pathologically enlarged or hypermetabolic lymph nodes.  Prominent scattered physiologic muscular activity.     In the chest, there is similar appearing mild right breast skin thickening with decreased hypermetabolic activity now measuring 2.3 (image 103), previously SUV max 3.2.     There is a 3 mm left axillary lymph node with mild uptake, SUV max 2.6 (axial image 56), favored to represent reactive etiology.  Attention on follow-up.     There are no concerning pulmonary nodules or masses, and there are no pathologically enlarged or hypermetabolic lymph nodes.  Prominent physiologic tracer activity throughout thoracic musculature.     In the abdomen and pelvis, there is physiologic tracer distribution within the abdominal organs and excretion into the genitourinary system.  Similar mildly hypermetabolic focus in the 1st portion of the duodenum without CT correlate measuring SUV max 5.5 (image 134), possibly physiologic although nonspecific.     In the bones, there are no hypermetabolic lesions worrisome for malignancy.  Prominent focus of  hypermetabolic uptake in the left piriformis without underlying CT correlate (image 220), presumably physiologic.     Additional findings: Right chest wall implanted tunnel central venous catheter tip terminating in the superior vena cava.  Stable punctate pulmonary micronodule in the left upper lobe (axial series 3, image 73).  Coronary arterial and valvular calcific plaque.  Advanced aortoiliac calcific plaque.     Impression:     Similar appearing mild right breast skin thickening with decreased hypermetabolic activity. No new abnormal tracer uptake elsewhere worrisome for malignancy.     Additional findings as above.    CT c/a/p 5/3/24   Impression:     No acute abdominopelvic abnormality.  Specifically, no evidence for acute pancreatitis as clinically questioned.     Vague area of peripheral ground-glass with grouped micro-nodules at the peripheral right lung base with appearance suggesting sequela of infectious or non-infectious bronchiolitis.     Similar degree of asymmetric skin thickening at the right breast.  To correlate with mammographic and oncologic history.     Ectatic infrarenal abdominal aorta and additional findings as above.       Assessment:       1. Recurrent breast cancer, unspecified laterality    2. History of lung cancer    3. Pulmonary nodule    4. Chronic obstructive pulmonary disease, unspecified COPD type              Plan:     1.,2.   Pt with hx of Stage 1A invasive ductal carcinoma rt breast s/p rt segmental mastectomy and SLN bx 7/11/2014 ER/NV neg HER 2 jose maria neg [id number](i-).  S/p adjuvant RT completed 9/2014 Pt declined adjuvant chemo  Pt  hospitalized 11/2017 with acute-on-chronic  resp failure  Abnormal CT imaging revealing Large right hilar mass with involvement of  adjacent segmental pulmonary arterial branches and bronchi with associated postobstructive atelectasis  and involvement of mediastinal and axillary adenopathy   S/p rt axillary LN bx ( outside facility) - metastatic  poorly differentiated carcinoma of unknown primary  site  status post  lung biopsy 1/31/2017 -benign lung tissue  PET/CT 1/26/2018   Intense FDG uptake within the known right infrahilar lung mass, compatible with malignancy.   Intensely hypermetabolic right axillary, retropectoral, and lower right cervical lymph nodes, compatible with metastases.  Abnormal FDG uptake along right breast skin thickening, new from prior chest CTA and mammogram.  PET/CT 11/14/23 Similar appearing right breast skin thickening with mild hypermetabolic activity.  No new focal abnormal tracer uptake elsewhere.  Repeat lung bx 2/14/2018 revealed Squamous Cell CA lung PD-L1 10% EGFR NEGALK NEG  Pt treated for advanced squamous cell CA of lung She s/p  cycle 6 of carboplatin/Abraxane Day1 completed 7/2/2018 ( day 15 held due to prolonged cytopenias)  She completed therapy with near CR     PET/CT 2/21/2019 showed increased size and irregularity of the right axillary lymph node with increased FDG avidity     MAMMO/US rt breast 3/2019  reveals suspicious findings rt axilla LN.  Biopsy of the lymph node measuring 1.4 x 1.1 x 1.3     Pt s/p  rt axillary LN bxPositive for poorly differentiated carcinoma.- likely breast primary ERneg PRneg Her 2 neg    Outside review of specimen(s) revealed  lung tumor corresponds to a squamous cell carcinoma and appears to be unrelated to the invasive ductal carcinoma of the breast. It was determined The axillary mass, in my opinion, corresponded  to metastases of the breast primary. Although it is a poorly differentiated carcinoma, theimmunophenotype is most consistent with the one that the breast primary exhibited, and is inconsistent with a metastatic squamous cell carcinoma.     CT imaging 4/26/2019 persistent rt axillary LAD, no other sites of disease     Lymph node biopsy 3/11/2019 Right axilla, mass, core biopsy:Positive for poorly differentiated carcinoma.favor breast primary     PET/CT 6/20/2019  New and  worsening hypermetabolic lymph nodes concerning for metastatic breast cancer     Previously Discussed  imaging findings in detail with patient which reveals new and worsening hypermetabolic melena metabolic lymph nodes including hypermetabolic supraclavicular node.  Therefore, at treatment option will be systemic chemotherapy in light of the recent imaging findings.  She will be followed by her surgical oncologist Dr. Christopher      IT was determined to proceed with systemic chemotherapy   S/p  AC [id number] x 3 wks x 4 wks completed 9/6/2019   ( pt has not received in past)      PET/CT 9/19/2019 shows disease response with interval decrease in size and uptake of several right axillary lymph nodes and a supraclavicular lymph node.  Mild residual activity may indicate viable tumor.No new hypermetabolic findings worrisome for malignancy.    Pt followed by  Breast Surgery and plan was  for possible   ALND. Pt with Recurrent cancer in her right axilla and right supraclavicular fossa.   She completed chemotherapy 9/6/2019 with near CR  It was determined  Radiation will be given to the original areas of hypermetabolic activity in the right axilla and right supraclavicular region    Pt completed  RT 12/16/2019     PET/CT 1/14/21 shows   New right axillary hypermetabolic lymph nodes with preserved normal architecture suggestive of reactive nodes.  Stable appearing previously identified hypermetabolic lymph node with mild increase in surrounding fat stranding.        Findings previously d/w with treating breast surgeon and it was determined to cont to monitor and close f/u as pt is heavily pre treated, has received RT to site   Pt acknowledged understanding and agreeable with plan     PET/CT imaging  5/20/21 shows Continued increase in both size and hypermetabolic activity of right axillary level 1 lymph nodes a new, mildly hypermetabolic cutaneous thickening of the right breast.     Right breast, skin, punch biopsy:Negative for  carcinoma    LYMPH NODE, RIGHT AXILLA, BIOPSY: 6/16/21   - Metastatic poorly-differentiated carcinoma, consistent with breast primary triple neg  PD-L1 immunohistochemistry studies(combined positive score, CPS) is greater than or equal to 10   PET/CT showed  No definite evidence of hypermetabolic tumor.  Interval resolution of hypermetabolic right axillary lymph nodes.  No new hypermetabolic findings.      S/p C16 pembrolizumab 6/15/22   Last  PET/CT imaging shows  new left lower lobe opacification with mild radiotracer uptake which may represent aspiration, pneumonia, or malignancy.  Recent follow-up imaging studies decreasing patchy pulmonary consolidation within the left lower lobe when compared to prior PET-CT scan dated 07/13/2022.  Plan to remain off of therapy   Follow-up PET/CT imaging 1/11/23 Interval resolution of hypermetabolic opacification in the left lower lobe as compared to FDG PET-CT 07/14/2022.  Interval improvement in patchy opacities in the right lower lobe as compared to CT 11/21/2022   follow up PET imaging [id number] -  performed at [facility] .([employer] was not available at  since broken )No opacity in the left lower lobe on the current exam to correspond to a left lower lobe opacity described on the 7/13/2022 PET CT report.  Images from the 7/13/2022 PET CT are not available at the time of this report.       Follow up PET imaging 3/19/24 shows Similar appearing mild right breast skin thickening with decreased hypermetabolic activity. No new abnormal tracer uptake elsewhere worrisome for malignancy.   CT imaging 5/2024 SHERRILL recurrence  follow-up PET imaging 11/12/24 - SHERRILL recurrence  Patient clinically stable  Cont to monitor     3. Plan follow up CT chest imaging as recommended per d/w pulmonology    F/b Pulmonology  Pt on supp 02 at night   Cont MDI and O2 as prescribed       4. Stable  Followed by pulmonology       Plan CT in Feb  Cbc,cmp prior to f/u in march      I spent a total of 20  minutes on the day of the visit.This includes face to face time and non-face to face time preparing to see the patient (eg, review of tests), obtaining and/or reviewing separately obtained history, documenting clinical information in the electronic or other health record, independently interpreting results and communicating results to the patient/family/caregiver, and coordinating care.

## 2025-01-27 ENCOUNTER — TELEPHONE (OUTPATIENT)
Dept: OTOLARYNGOLOGY | Facility: CLINIC | Age: 80
End: 2025-01-27
Payer: MEDICARE

## 2025-01-27 NOTE — TELEPHONE ENCOUNTER
Notified patient that we have nothing open for morning apps. Will put a note on appt to move up if something comes open earlier

## 2025-01-27 NOTE — TELEPHONE ENCOUNTER
----- Message from DURAN Bruce sent at 1/27/2025  1:06 PM CST -----  Regarding: FW: Appt  Contact: [phone]    ----- Message -----  From: Xi Bailon  Sent: 1/27/2025  12:16 PM CST  To: Chandni Hendrickson Staff  Subject: Appt                                             Type:  Needs Medical Advice    Who Called: Ade  Would the patient rather a call back or a response via [facility]? Call  Best Call Back Number:  [phone]  Additional Information: Patient is calling to see if she can be seen in the am on 1/29

## 2025-01-29 ENCOUNTER — OFFICE VISIT (OUTPATIENT)
Dept: OTOLARYNGOLOGY | Facility: CLINIC | Age: 80
End: 2025-01-29
Payer: MEDICARE

## 2025-01-29 VITALS
HEIGHT: 63 IN | DIASTOLIC BLOOD PRESSURE: 67 MMHG | WEIGHT: 157.88 LBS | BODY MASS INDEX: 27.97 KG/M2 | SYSTOLIC BLOOD PRESSURE: 105 MMHG

## 2025-01-29 DIAGNOSIS — H74.8X3 TYPE B TYMPANOGRAM, BILATERAL: ICD-10-CM

## 2025-01-29 DIAGNOSIS — H65.21 RIGHT CHRONIC SEROUS OTITIS MEDIA: Primary | ICD-10-CM

## 2025-01-29 DIAGNOSIS — H69.91 DYSFUNCTION OF RIGHT EUSTACHIAN TUBE: ICD-10-CM

## 2025-01-29 DIAGNOSIS — H90.A32 MIXED CONDUCTIVE AND SENSORINEURAL HEARING LOSS OF LEFT EAR WITH RESTRICTED HEARING OF RIGHT EAR: ICD-10-CM

## 2025-01-29 NOTE — PROGRESS NOTES
"  Ear, Nose, & Throat  Otolaryngology - Head & Neck Surgery      Subjective:     Chief Complaint:   Chief Complaint   Patient presents with    Right chronic serous otitis media     6 week tube check      01/29/2025:  Returns following PE tube placement. No otorrhea. Feels significantly improved. No other issues    HPI:  Ade Castellano is a 79 y.o. female who returns after seeing Dr. Mckee in August for right ear fullness.    Patient reports that she was persistent fullness in the right ear.  Continues to not have any infections he was history.  Does not have a previous history of tubes or ear surgery.  She states that she was constantly trying to pop her ear with little success.  She was a long-term use her Flonase for allergic rhinitis.  Previous audiogram showed a bilateral sensorineural hearing loss with a mixed loss on the left side and flat type B tymps bilaterally.    Of note, patient has Stage IV breast cancer. Recent PET/CT negative,    Notes from Dr cMkee Visit  "HPI      Ade Castellano is a 78 y.o. female presents for evaluation of intermittent ear fullness of her right ear.  She states this happens occasionally and lasts seconds.  Denies any associated hearing loss.  She was seen by me 2 years ago and noted to have middle ear effusions which she said got better.  She also reports dizziness that happens rarely when turning her head.  It is a non room spinning dizziness.  She describes it as a lightheaded feeling."      Past Medical History  Active Ambulatory Problems     Diagnosis Date Noted    Coronary artery disease of native artery of native heart with stable angina pectoris 08/23/2013    Old myocardial infarction 10/14/2014    Atherosclerosis of aorta 12/30/2015    Prediabetes 02/22/2017    DNR (do not resuscitate) 11/29/2017    Centrilobular emphysema 02/06/2018    Squamous cell carcinoma lung 03/02/2018    Metastatic breast cancer 07/01/2019    Hypertension 10/10/2019    LOGAN (dyspnea on " exertion) 08/05/2020    Chronic heart failure with preserved ejection fraction 08/05/2020    Cancer of overlapping sites of lung 10/25/2018    Dependence on supplemental oxygen 02/01/2022    Hypokalemia 04/06/2022    Generalized weakness 04/06/2022    Immunodeficiency due to chemotherapy 03/21/2023    Achalasia 11/15/2023    Allergic sinusitis 10/17/2024     Resolved Ambulatory Problems     Diagnosis Date Noted    PUD (peptic ulcer disease)     Benign hypertensive heart disease without heart failure 08/23/2013    Chest tightness 08/23/2013    Lump or mass in breast 06/17/2014    History of breast cancer 07/11/2014    SUNITHA (obstructive sleep apnea)     COPD exacerbation     Hyperlipidemia LDL goal <70 10/14/2014    Stable angina 10/14/2014    Obstructive chronic bronchitis without exacerbation 10/14/2014    Other emphysema 10/14/2014    Screening for colon cancer 03/17/2017    Personal history of colonic polyps 06/30/2017    H/O colonoscopy 06/01/2017    Esophageal dysphagia 10/11/2017    Acute on chronic respiratory failure with hypoxia and hypercapnia 11/29/2017    Tobacco abuse 11/29/2017    Lung mass 11/29/2017    Cavitary pneumonia 11/29/2017    Debility 12/03/2017    Squamous cell carcinoma of lung 03/07/2018    Allergic reaction 05/07/2018    History of lung cancer 05/07/2018    Encounter for chemotherapy management 05/07/2018    Severe anemia 06/11/2018    Hypomagnesemia 06/12/2018    Chemotherapy induced neutropenia 06/12/2018    Antineoplastic chemotherapy induced anemia 06/12/2018    Colon cancer screening 11/28/2018    Screen for colon cancer 01/29/2019    Neutropenic fever 07/20/2019    Chronic obstructive pulmonary disease with acute lower respiratory infection 07/20/2019    Neutropenia associated with infection 07/21/2019    Chemotherapy-induced neutropenia 07/21/2019    Chemotherapy follow-up examination 07/25/2019    Preop cardiovascular exam 10/10/2019    Aortic arch atherosclerosis 06/11/2020     Abnormal stress test 08/05/2020    Dysphagia 01/25/2021    Stage 3 severe COPD by GOLD classification 10/25/2018    Secondary malignant neoplasm of unspecified site (CODE) 02/01/2022    Orthostatic hypotension 04/06/2022    Chronic respiratory failure with hypoxia, on home O2 therapy 03/21/2023    TIA (transient ischemic attack) 05/21/2024     Past Medical History:   Diagnosis Date    Acute respiratory failure with hypoxia and hypercapnia 11/29/2017    Angina pectoris     Arthritis     Bell's palsy     Breast cancer     CAD (coronary artery disease)     Cervical cancer     Chronic bronchitis     COPD (chronic obstructive pulmonary disease)     Dental bridge present     Emphysema of lung     History of Bell's palsy     History of heart artery stent     Hyperlipidemia     Lung cancer     Myocardial infarction     Pneumonia     Pneumonia due to other staphylococcus     Sleep apnea     Vaginal delivery        Past Surgical History  She has a past surgical history that includes Cervix surgery; Breast surgery; Tonsillectomy; Adenoidectomy; sweat glands axillary regions (Bilateral); Colonoscopy (N/A, 3/17/2017); Breast biopsy (Right); Colonoscopy (N/A, 6/30/2017); Portacath placement (Right, 01/2018); Colonoscopy (N/A, 11/28/2018); Colonoscopy (N/A, 1/29/2019); Eye surgery (Bilateral, 06/08/2018); Breast lumpectomy; Left heart catheterization (Left, 8/13/2020); Esophagogastroduodenoscopy (N/A, 1/25/2021); Coronary angioplasty with stent; Colonoscopy (N/A, 7/26/2022); Esophagogastroduodenoscopy (N/A, 11/8/2022); Esophageal manometry with measurement of impedance (N/A, 7/10/2023); and Esophagogastroduodenoscopy (N/A, 9/19/2023).    Past Surgical History:   Procedure Laterality Date    ADENOIDECTOMY      BREAST BIOPSY Right     x3    BREAST LUMPECTOMY      BREAST SURGERY      lumpectomy right side     CERVIX SURGERY      cone    COLONOSCOPY N/A 3/17/2017    Procedure: COLONOSCOPY;  Surgeon: Julio Rudd MD;  Location: [facility]  ENDO;  Service: Endoscopy;  Laterality: N/A;    COLONOSCOPY N/A 6/30/2017    Procedure: COLONOSCOPY;  Surgeon: Julio Rudd MD;  Location: [facility] ENDO;  Service: Endoscopy;  Laterality: N/A;    COLONOSCOPY N/A 11/28/2018    Procedure: COLONOSCOPY;  Surgeon: Nura Urbina MD;  Location: [facility] ENDO;  Service: Endoscopy;  Laterality: N/A;    COLONOSCOPY N/A 1/29/2019    Procedure: COLONOSCOPY;  Surgeon: Emmanuel Perez MD;  Location: [facility] ENDO;  Service: Endoscopy;  Laterality: N/A;  confirmed appt-SP    COLONOSCOPY N/A 7/26/2022    Procedure: COLONOSCOPY;  Surgeon: James Healy MD;  Location: [facility];  Service: Endoscopy;  Laterality: N/A;  fully vaccinated -  mediport in chest -     CORONARY ANGIOPLASTY WITH STENT PLACEMENT      ESOPHAGEAL MANOMETRY WITH MEASUREMENT OF IMPEDANCE N/A 7/10/2023    Procedure: MANOMETRY, ESOPHAGUS, WITH IMPEDANCE MEASUREMENT;  Surgeon: Miller Phillips MD;  [facility]  Service: Gastroenterology;  Laterality: N/A;  pt on oxygen 2.5L  pt requested Tuesday only  5/31 referred by Dr. Miller Phillips/instr. mailed-st  7/3-r/s, updated instructions reviewed with pt in detail via phone, pt verbalized understanding-[facility]    ESOPHAGOGASTRODUODENOSCOPY N/A 1/25/2021    Procedure: EGD (ESOPHAGOGASTRODUODENOSCOPY);  Surgeon: Dmitry Gonzalez MD;  Location: [facility];  Service: Endoscopy;  Laterality: N/A;  rapid test >50 miles -ml    ESOPHAGOGASTRODUODENOSCOPY N/A 11/8/2022    Procedure: EGD (ESOPHAGOGASTRODUODENOSCOPY);  Surgeon: Tapan Stevenson MD;  Location: [facility] ENDO;  Service: Endoscopy;  Laterality: N/A;  w/dilation  fully vaccinated, instructions mailed-[facility]  11/4 pt called did not receive instructions, does not have portal or email, went over prep instructions and medication instructions with pt on the phone -LW    ESOPHAGOGASTRODUODENOSCOPY N/A 9/19/2023    Procedure: EGD (ESOPHAGOGASTRODUODENOSCOPY);  Surgeon: Miller Phillips MD;  Location: [facility];   "Service: Endoscopy;  Laterality: N/A;  botox injection  inst mailed    EYE SURGERY Bilateral 06/08/2018    cataract     LEFT HEART CATHETERIZATION Left 8/13/2020    Procedure: Left heart cath, tom, noon;  Surgeon: Guevara Skleton MD;  Location: [facility] CATH LAB;  Service: Cardiology;  Laterality: Left;  RN PREOP 8/7/2020---COVID NEGATIVE ON 8/12    PORTACATH PLACEMENT Right 01/2018    sweat glands axillary regions Bilateral     TONSILLECTOMY          Family History  Her family history includes Breast cancer in her mother; COPD in her sister; Cancer in her father, maternal grandfather, maternal grandmother, mother, paternal grandfather, paternal grandmother, sister, and sister; Heart attack in her sister; Heart disease in her maternal grandfather, maternal grandmother, mother, and sister; Kidney cancer in her son.    Social History  She reports that she quit smoking about 7 years ago. Her smoking use included cigarettes. She started smoking about 57 years ago. She has a 12.5 pack-year smoking history. She has been exposed to tobacco smoke. She has never used smokeless tobacco. She reports that she does not drink alcohol and does not use drugs.    Allergies  She has No Known Allergies.    Medications  She has a current medication list which includes the following prescription(s): acetaminophen, albuterol, albuterol, aspirin, azelastine, diclofenac, fluticasone propionate, trelegy ellipta, gabapentin, isosorbide mononitrate, levocetirizine, methocarbamol, metoprolol tartrate, montelukast, neomycin-polymyxin-hydrocortisone, nitroglycerin, pantoprazole, and rosuvastatin.    ROS:  Pertinent positive and negative review of systems as noted in HPI.     Objective:     /67 (BP Location: Left arm, Patient Position: Sitting)   Ht 5' 3" (1.6 m)   Wt 71.6 kg (157 lb 13.6 oz)   LMP  (LMP Unknown)   BMI 27.96 kg/m²    Physical Exam  Constitutional: Well appearing, communicating. No acute distress  Voice: Euphonic  Eyes: " Conjunctiva WNL, Pupils reactive  Head/Face: House Brackmann I Bilaterally.  Right Ear:    Auricle normally developed   EAC: normal   Tympanic membrane: PE tube in place and patent   Middle Ear: No effusion present and Ossicles in normal position  Left Ear:    Auricle normally developed   EAC: normal   Tympanic membrane: intact thick, outer rim of myringosclerosis present   Middle Ear: No effusion present and Ossicles in normal position  Neuro/Psychiatric:     Affect: Appropriate  Respiratory: No increased WOB, no stridor       Data Review:   LABS      IMAGING    No pertinent imaging available    AUDIO  08/20204  d:    2024    I have independently reviewed the following audiogram and reviewed the report. Findings as follows:     Tympanometry  Right: Type B  Left: Type B      Procedures:        Assessment:     1. Right chronic serous otitis media    2. Type b tympanogram, bilateral    3. Mixed conductive and sensorineural hearing loss of left ear with restricted hearing of right ear    4. Dysfunction of right eustachian tube          Plan:     Seems to be doing well with PE tube. RTC in 1 year with audio.     Voice recognition software was used in the creation of this note/communication and any sound-alike errors which may have occurred from its use should be taken in context when interpreting. If such errors prevent a clear understanding of the note/communication, please contact the office for clarification.   Problem List Items Addressed This Visit    None  Visit Diagnoses       Right chronic serous otitis media    -  Primary    Type b tympanogram, bilateral        Mixed conductive and sensorineural hearing loss of left ear with restricted hearing of right ear        Dysfunction of right eustachian tube

## 2025-02-18 ENCOUNTER — TELEPHONE (OUTPATIENT)
Dept: CARDIOLOGY | Facility: CLINIC | Age: 80
End: 2025-02-18
Payer: MEDICARE

## 2025-02-21 ENCOUNTER — TELEPHONE (OUTPATIENT)
Dept: FAMILY MEDICINE | Facility: CLINIC | Age: 80
End: 2025-02-21
Payer: MEDICARE

## 2025-02-21 NOTE — TELEPHONE ENCOUNTER
Was patient sent to ER? Please call to find out what is going on? If new stroke, patient should have been sent to ER

## 2025-02-21 NOTE — TELEPHONE ENCOUNTER
Called Dr.Ellen Rosenberg office and asked for Bria who had called . The  stated that Bria was not available to come to the phone due to being in clinic. Informed that we are returning the call based on the message from Bria and  is following up if the patient went to the ER department for possible stroke.  stated she was not able to pull Bria away from clinic.     Will call patient to  follow up . Patient states she was notified by  to get a CT scan for reevaluation of her left eye since last exam this eye has gotten weaker. Patient has an appointment with  on 25 Feb 2025 at 0940 and will discuss this issue.

## 2025-02-21 NOTE — TELEPHONE ENCOUNTER
----- Message from Ramy Ortiz sent at 2/19/2025  1:17 PM CST -----  Regarding: FW: Dr Chani Rosenberg    ----- Message -----  From: Amado Muhammad  Sent: 2/19/2025  10:56 AM CST  To: Elana ANDERSON Staff  Subject: Dr Chani Rosenberg                                  Type: Patient Call BackWho called:Bria-Dr Chani Rosenberg ([facility])What is the request in detail:Calling to state that this patient needs CT scan orders to rule out possible new stroke. Can the clinic reply by [facility]? NoWould the patient rather a call back or a response via [facility]? Call [facility] call back number:[phone] Information:Thank you.

## 2025-02-22 DIAGNOSIS — Z00.00 ENCOUNTER FOR MEDICARE ANNUAL WELLNESS EXAM: ICD-10-CM

## 2025-02-24 ENCOUNTER — PATIENT OUTREACH (OUTPATIENT)
Dept: ADMINISTRATIVE | Facility: HOSPITAL | Age: 80
End: 2025-02-24
Payer: MEDICARE

## 2025-02-25 ENCOUNTER — OFFICE VISIT (OUTPATIENT)
Dept: FAMILY MEDICINE | Facility: CLINIC | Age: 80
End: 2025-02-25
Payer: MEDICARE

## 2025-02-25 ENCOUNTER — TELEPHONE (OUTPATIENT)
Dept: FAMILY MEDICINE | Facility: CLINIC | Age: 80
End: 2025-02-25

## 2025-02-25 VITALS
DIASTOLIC BLOOD PRESSURE: 76 MMHG | WEIGHT: 163.81 LBS | HEIGHT: 63 IN | BODY MASS INDEX: 29.02 KG/M2 | OXYGEN SATURATION: 94 % | TEMPERATURE: 98 F | HEART RATE: 50 BPM | SYSTOLIC BLOOD PRESSURE: 114 MMHG

## 2025-02-25 DIAGNOSIS — G61.82 MULTIFOCAL MOTOR NEUROPATHY: Primary | ICD-10-CM

## 2025-02-25 DIAGNOSIS — G45.9 TRANSIENT CEREBRAL ISCHEMIA, UNSPECIFIED TYPE: ICD-10-CM

## 2025-02-25 DIAGNOSIS — J44.9 CHRONIC OBSTRUCTIVE PULMONARY DISEASE, UNSPECIFIED COPD TYPE: ICD-10-CM

## 2025-02-25 DIAGNOSIS — C34.91 SQUAMOUS CELL CARCINOMA OF RIGHT LUNG: ICD-10-CM

## 2025-02-25 DIAGNOSIS — G89.29 CHRONIC BILATERAL LOW BACK PAIN WITHOUT SCIATICA: ICD-10-CM

## 2025-02-25 DIAGNOSIS — M54.50 CHRONIC BILATERAL LOW BACK PAIN WITHOUT SCIATICA: ICD-10-CM

## 2025-02-25 DIAGNOSIS — J96.11 CHRONIC RESPIRATORY FAILURE WITH HYPOXIA: ICD-10-CM

## 2025-02-25 DIAGNOSIS — I50.32 CHRONIC HEART FAILURE WITH PRESERVED EJECTION FRACTION: ICD-10-CM

## 2025-02-25 DIAGNOSIS — S39.012A LUMBAR STRAIN, INITIAL ENCOUNTER: ICD-10-CM

## 2025-02-25 PROCEDURE — 1101F PT FALLS ASSESS-DOCD LE1/YR: CPT | Mod: HCNC,CPTII,S$GLB, | Performed by: FAMILY MEDICINE

## 2025-02-25 PROCEDURE — 1159F MED LIST DOCD IN RCRD: CPT | Mod: HCNC,CPTII,S$GLB, | Performed by: FAMILY MEDICINE

## 2025-02-25 PROCEDURE — 1126F AMNT PAIN NOTED NONE PRSNT: CPT | Mod: HCNC,CPTII,S$GLB, | Performed by: FAMILY MEDICINE

## 2025-02-25 PROCEDURE — 3074F SYST BP LT 130 MM HG: CPT | Mod: HCNC,CPTII,S$GLB, | Performed by: FAMILY MEDICINE

## 2025-02-25 PROCEDURE — 99999 PR PBB SHADOW E&M-EST. PATIENT-LVL IV: CPT | Mod: PBBFAC,HCNC,, | Performed by: FAMILY MEDICINE

## 2025-02-25 PROCEDURE — 99214 OFFICE O/P EST MOD 30 MIN: CPT | Mod: HCNC,S$GLB,, | Performed by: FAMILY MEDICINE

## 2025-02-25 PROCEDURE — 3288F FALL RISK ASSESSMENT DOCD: CPT | Mod: HCNC,CPTII,S$GLB, | Performed by: FAMILY MEDICINE

## 2025-02-25 PROCEDURE — 1157F ADVNC CARE PLAN IN RCRD: CPT | Mod: HCNC,CPTII,S$GLB, | Performed by: FAMILY MEDICINE

## 2025-02-25 PROCEDURE — 3078F DIAST BP <80 MM HG: CPT | Mod: HCNC,CPTII,S$GLB, | Performed by: FAMILY MEDICINE

## 2025-02-25 RX ORDER — METHYLPREDNISOLONE 4 MG/1
TABLET ORAL
Qty: 1 EACH | Refills: 0 | Status: SHIPPED | OUTPATIENT
Start: 2025-02-25 | End: 2025-03-18

## 2025-02-25 RX ORDER — METHOCARBAMOL 500 MG/1
500 TABLET, FILM COATED ORAL 2 TIMES DAILY PRN
Qty: 30 TABLET | Refills: 0 | Status: SHIPPED | OUTPATIENT
Start: 2025-02-25

## 2025-02-25 NOTE — PROGRESS NOTES
"Subjective     Patient ID: Ade Castellano is a 79 y.o. female.    Chief Complaint: Back Pain    79 year old female presents for follow up. She states she  was seen by her eye doctor. She had a mini stroke and she reports that she had another  mini stroke and her eye vision became "completely white". This has improved, but still has decreased vision in her left eye.  She was seen by neurology, Dr. Matthew Montgomery who advised her to increase her aspirin from 81 to 325 mg. She was also advised that she needs to be on a blood thinner. She wants to defer this blood thinner until she sees her cardiologist.  I called the pharmacy and the stated  she only had 325 mg of aspirin.     She has a new pulmonoloigst. She states she is being followed for this. She had a CT scan being done to check her lungs due to her back pain .    Back Pain  This is a chronic problem. The current episode started more than 1 month ago. The pain is present in the lumbar spine. The quality of the pain is described as aching. The pain is at a severity of 0/10. The patient is experiencing no pain. Associated symptoms include leg pain and numbness. Pertinent negatives include no bladder incontinence or bowel incontinence. Treatments tried: tylenol arthritis as she is on blood thinner.       History of Present Illness               Past Medical History:   Diagnosis Date    Abnormal stress test 8/5/2020    Acute respiratory failure with hypoxia and hypercapnia 11/29/2017    Angina pectoris     Arthritis     Bell's palsy     left facial weakness    Breast cancer     RIGHT    CAD (coronary artery disease)     Cervical cancer     Chronic bronchitis     Chronic heart failure with preserved ejection fraction 8/5/2020    Chronic heart failure with preserved ejection fraction 8/5/2020    COPD (chronic obstructive pulmonary disease)     Dr. Katz    Dental bridge present     Emphysema of lung     H/O colonoscopy 06/2017    due for repeat colonsocopy in 6/2018 "    History of Bell's palsy     History of heart artery stent     Dr. Ortiz  x2 stents    Hyperlipidemia     Hypertension     Lung cancer     Myocardial infarction     SUNITHA (obstructive sleep apnea)     intolerant to mask    SUNITHA (obstructive sleep apnea)     intolerant to mask     Pneumonia     Pneumonia due to other staphylococcus     PUD (peptic ulcer disease)     Severe anemia 6/11/2018    Sleep apnea     Vaginal delivery     x1      Past Surgical History:   Procedure Laterality Date    ADENOIDECTOMY      BREAST BIOPSY Right     x3    BREAST LUMPECTOMY      BREAST SURGERY      lumpectomy right side     CERVIX SURGERY      cone    COLONOSCOPY N/A 3/17/2017    Procedure: COLONOSCOPY;  Surgeon: Julio Rudd MD;  Location: [facility] ENDO;  Service: Endoscopy;  Laterality: N/A;    COLONOSCOPY N/A 6/30/2017    Procedure: COLONOSCOPY;  Surgeon: Julio Rudd MD;  Location: [facility] ENDO;  Service: Endoscopy;  Laterality: N/A;    COLONOSCOPY N/A 11/28/2018    Procedure: COLONOSCOPY;  Surgeon: Nura Urbina MD;  Location: [facility] ENDO;  Service: Endoscopy;  Laterality: N/A;    COLONOSCOPY N/A 1/29/2019    Procedure: COLONOSCOPY;  Surgeon: Emmanuel Perez MD;  Location: [facility];  Service: Endoscopy;  Laterality: N/A;  confirmed appt-SP    COLONOSCOPY N/A 7/26/2022    Procedure: COLONOSCOPY;  Surgeon: James Healy MD;  Location: [facility];  Service: Endoscopy;  Laterality: N/A;  fully vaccinated -sm  mediport in chest -     CORONARY ANGIOPLASTY WITH STENT PLACEMENT      ESOPHAGEAL MANOMETRY WITH MEASUREMENT OF IMPEDANCE N/A 7/10/2023    Procedure: MANOMETRY, ESOPHAGUS, WITH IMPEDANCE MEASUREMENT;  Surgeon: Miller Phillips MD;  [facility]  Service: Gastroenterology;  Laterality: N/A;  pt on oxygen 2.5L  pt requested Tuesday only  5/31 referred by Dr. Miller Phillips/instr. mailed-st  7/3-r/s, updated instructions reviewed with pt in detail via phone, pt verbalized understanding-vt     ESOPHAGOGASTRODUODENOSCOPY N/A 1/25/2021    Procedure: EGD (ESOPHAGOGASTRODUODENOSCOPY);  Surgeon: Dmitry Gonzalez MD;  Location: [facility] ENDO;  Service: Endoscopy;  Laterality: N/A;  rapid test >50 miles -ml    ESOPHAGOGASTRODUODENOSCOPY N/A 11/8/2022    Procedure: EGD (ESOPHAGOGASTRODUODENOSCOPY);  Surgeon: Tapan Stevenson MD;  Location: [facility] ENDO;  Service: Endoscopy;  Laterality: N/A;  w/dilation  fully vaccinated, instructions mailed-[facility] [place]  11/4 pt called did not receive instructions, does not have portal or email, went over prep instructions and medication instructions with pt on the phone -LW    ESOPHAGOGASTRODUODENOSCOPY N/A 9/19/2023    Procedure: EGD (ESOPHAGOGASTRODUODENOSCOPY);  Surgeon: Miller Phillips MD;  Location: [facility] ENDO;  Service: Endoscopy;  Laterality: N/A;  botox injection  inst mailed    EYE SURGERY Bilateral 06/08/2018    cataract     LEFT HEART CATHETERIZATION Left 8/13/2020    Procedure: Left heart cath, rra, noon;  Surgeon: Guevara Skelton MD;  Location: [facility] CATH LAB;  Service: Cardiology;  Laterality: Left;  RN PREOP 8/7/2020---COVID NEGATIVE ON 8/12    PORTACATH PLACEMENT Right 01/2018    sweat glands axillary regions Bilateral     TONSILLECTOMY       Family History   Problem Relation Name Age of Onset    Cancer Mother          breast    Heart disease Mother      Breast cancer Mother      Cancer Father          lung-smoker     Cancer Sister          lung-smoker     Heart attack Sister      Cancer Maternal Grandmother      Heart disease Maternal Grandmother      Cancer Maternal Grandfather      Heart disease Maternal Grandfather      Cancer Paternal Grandmother      Cancer Paternal Grandfather      Cancer Sister          mets not sure where it started     COPD Sister      Heart disease Sister      Kidney cancer Son      Colon cancer Neg Hx      Esophageal cancer Neg Hx       Social History[1]    Review of Systems   Gastrointestinal:  Negative for bowel incontinence.   Genitourinary:   "Negative for bladder incontinence.   Musculoskeletal:  Positive for back pain and leg pain.   Neurological:  Positive for numbness.            Objective     Vitals:    02/25/25 0820   BP: 114/76   Pulse: (!) 50   Temp: 97.9 °F (36.6 °C)   TempSrc: Oral   SpO2: (!) 94%   Weight: 74.3 kg (163 lb 12.8 oz)   Height: 5' 3" (1.6 m)        Physical Exam  Constitutional:       General: She is not in acute distress.     Appearance: Normal appearance. She is not ill-appearing or toxic-appearing.   Cardiovascular:      Rate and Rhythm: Tachycardia present.      Heart sounds: Murmur heard.      No friction rub. No gallop.   Pulmonary:      Effort: Pulmonary effort is normal. No respiratory distress.      Breath sounds: Normal breath sounds. No stridor. No wheezing or rhonchi.   Musculoskeletal:      Lumbar back: Spasms and tenderness present. No bony tenderness. Decreased range of motion. Negative right straight leg raise test and negative left straight leg raise test.      Right lower leg: No edema.      Left lower leg: No edema.   Neurological:      General: No focal deficit present.      Mental Status: She is alert.       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     2. Squamous cell carcinoma of right lung  Improved. Scheduled for CT of lung    3. Chronic heart failure with preserved ejection fraction  Doing well. No sob and uses home oxygen    4. Chronic obstructive pulmonary disease, unspecified COPD type  Doing well. Stable p. Per pumonary    5. Chronic respiratory failure with hypoxia  Stable on home oxygen.     6. Transient cerebral ischemia, unspecified type  -     CT Head Without Contrast; Future; Expected date: 02/25/2025  Ordered CT scan due to change in vision believed to be a stroke. She does hae a pacemaker. Increase aspirin to 325 as she is still having these episodes on aspirin 81 mg     7. Chronic bilateral low back " pain without sciatica  -     methylPREDNISolone (MEDROL DOSEPACK) 4 mg tablet; use as directed  Dispense: 1 each; Refill: 0  Asymptomatic now. She can use this if she develops an issue    8. Lumbar strain, initial encounter  -     methocarbamoL (ROBAXIN) 500 MG Tab; Take 1 tablet (500 mg total) by mouth 2 (two) times daily as needed (back spasm).  Dispense: 30 tablet; Refill: 0  Asymptomatic now. She can use this if she develops an issue    Ade was seen today for back pain.    Diagnoses and all orders for this visit:    Multifocal motor neuropathy    Squamous cell carcinoma of right lung    Chronic heart failure with preserved ejection fraction    Chronic obstructive pulmonary disease, unspecified COPD type    Chronic respiratory failure with hypoxia    Transient cerebral ischemia, unspecified type  -     CT Head Without Contrast; Future    Chronic bilateral low back pain without sciatica  -     methylPREDNISolone (MEDROL DOSEPACK) 4 mg tablet; use as directed    Lumbar strain, initial encounter  -     methocarbamoL (ROBAXIN) 500 MG Tab; Take 1 tablet (500 mg total) by mouth 2 (two) times daily as needed (back spasm).        Assessment & Plan                      No follow-ups on file.        This note was generated with the assistance of ambient listening technology. Verbal consent was obtained by the patient and accompanying visitor(s) for the recording of patient appointment to facilitate this note. I attest to having reviewed and edited the generated note for accuracy, though some syntax or spelling errors may persist. Please contact the author of this note for any clarification.         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     Drug use: No    Sexual activity: Not Currently     Social Drivers of Health     Financial Resource Strain: Low Risk  (11/27/2024)    Overall Financial Resource Strain (CARDIA)     Difficulty of Paying Living Expenses: Not hard at all   Food Insecurity: No Food Insecurity (11/27/2024)    Hunger Vital Sign     Worried About Running Out of Food in the Last Year: Never true     Ran Out of Food in the Last Year: Never true   Transportation Needs: No Transportation Needs (10/17/2024)    PRAPARE - Transportation     Lack of Transportation (Medical): No     Lack of Transportation (Non-Medical): No   Physical Activity: Inactive (11/27/2024)    Exercise Vital Sign     Days of Exercise per Week: 0 days     Minutes of Exercise per Session: 0 min   Stress: No Stress Concern Present (11/27/2024)    [employer] - Occupational Stress Questionnaire     Feeling of Stress : Not at all   Housing Stability: Unknown (11/27/2024)    Housing Stability Vital Sign     Unable to Pay for Housing in the Last Year: No     Homeless in the Last Year: No

## 2025-02-25 NOTE — TELEPHONE ENCOUNTER
Clarified patients question regarding Lipitor medication if this was a blood thinner. Explained to her that is it not and this is for cholesterol. She stated understanding.

## 2025-02-25 NOTE — TELEPHONE ENCOUNTER
----- Message from Gretchen sent at 2/25/2025 10:44 AM CST -----  Regarding: self  Who called: selfWhat is the request in detail: pt had a few more questions from visit today. It is about medicationCan the clinic reply by [facility]? NoWould the patient rather a call back or a response via [facility]? Call [facility] call back number: [phone] Information:Thank you.

## 2025-02-26 ENCOUNTER — HOSPITAL ENCOUNTER (OUTPATIENT)
Dept: RADIOLOGY | Facility: HOSPITAL | Age: 80
Discharge: HOME | End: 2025-02-26
Attending: FAMILY MEDICINE
Payer: MEDICARE

## 2025-02-26 DIAGNOSIS — G45.9 TRANSIENT CEREBRAL ISCHEMIA, UNSPECIFIED TYPE: ICD-10-CM

## 2025-02-26 PROCEDURE — 70450 CT HEAD/BRAIN W/O DYE: CPT | Mod: 26,HCNC,, | Performed by: STUDENT IN AN ORGANIZED HEALTH CARE EDUCATION/TRAINING PROGRAM

## 2025-02-26 PROCEDURE — 70450 CT HEAD/BRAIN W/O DYE: CPT | Mod: TC,HCNC

## 2025-03-03 ENCOUNTER — TELEPHONE (OUTPATIENT)
Dept: FAMILY MEDICINE | Facility: CLINIC | Age: 80
End: 2025-03-03
Payer: MEDICARE

## 2025-03-03 ENCOUNTER — RESULTS FOLLOW-UP (OUTPATIENT)
Dept: FAMILY MEDICINE | Facility: CLINIC | Age: 80
End: 2025-03-03
Payer: MEDICARE

## 2025-03-03 NOTE — TELEPHONE ENCOUNTER
Called Dr. Rosenberg 's office at [phone] to obtain a fax number to send the CT scan results. Fax number [phone]. Faxed the results.

## 2025-03-03 NOTE — TELEPHONE ENCOUNTER
----- Message from Johnson sent at 3/3/2025  9:46 AM CST -----  Regarding: Ade  Type: Patient Callback Who called: Kelsey What is the request in detail: Pt stated that she did a CT Scan and she would like to know if she sent the results to her eye doctor. She would like for the office to give her a call to verify that for her. Please reach out to the patient. Can the clinic reply by [facility]? Yes Would the patient rather a call back or a response via [facility]? Callback Best call back number: [phone] Information:

## 2025-03-03 NOTE — TELEPHONE ENCOUNTER
----- Message from Shonda Scott DNP sent at 3/3/2025  3:34 PM CST -----  Notify patient CT scan is normal.   ----- Message -----  From: Interface, Rad Results In  Sent: 2/26/2025   7:26 AM CST  To: Jeannine Huitron MD

## 2025-03-03 NOTE — TELEPHONE ENCOUNTER
Dr.Ellen Rosenberg ophthalmologist is who would need her CT scan faxed to their office. Patient is not certain of the fax number.

## 2025-03-05 ENCOUNTER — TELEPHONE (OUTPATIENT)
Dept: ADMINISTRATIVE | Facility: CLINIC | Age: 80
End: 2025-03-05
Payer: MEDICARE

## 2025-03-05 DIAGNOSIS — I20.89 STABLE ANGINA: Chronic | ICD-10-CM

## 2025-03-05 NOTE — TELEPHONE ENCOUNTER
No care due was identified.  [facility] Embedded Care Due Messages. Reference number: [id number].   3/05/2025 1:46:11 PM CST

## 2025-03-06 ENCOUNTER — HOSPITAL ENCOUNTER (OUTPATIENT)
Dept: RADIOLOGY | Facility: HOSPITAL | Age: 80
Discharge: HOME | End: 2025-03-06
Attending: INTERNAL MEDICINE
Payer: MEDICARE

## 2025-03-06 ENCOUNTER — PATIENT OUTREACH (OUTPATIENT)
Dept: ADMINISTRATIVE | Facility: HOSPITAL | Age: 80
End: 2025-03-06
Payer: MEDICARE

## 2025-03-06 ENCOUNTER — OFFICE VISIT (OUTPATIENT)
Dept: FAMILY MEDICINE | Facility: CLINIC | Age: 80
End: 2025-03-06
Payer: MEDICARE

## 2025-03-06 ENCOUNTER — TELEPHONE (OUTPATIENT)
Dept: HEMATOLOGY/ONCOLOGY | Facility: CLINIC | Age: 80
End: 2025-03-06
Payer: MEDICARE

## 2025-03-06 VITALS
OXYGEN SATURATION: 98 % | RESPIRATION RATE: 20 BRPM | BODY MASS INDEX: 27.15 KG/M2 | TEMPERATURE: 99 F | SYSTOLIC BLOOD PRESSURE: 106 MMHG | HEIGHT: 63 IN | HEART RATE: 65 BPM | DIASTOLIC BLOOD PRESSURE: 64 MMHG | WEIGHT: 153.25 LBS

## 2025-03-06 DIAGNOSIS — I50.32 CHRONIC HEART FAILURE WITH PRESERVED EJECTION FRACTION: ICD-10-CM

## 2025-03-06 DIAGNOSIS — C50.919 RECURRENT BREAST CANCER, UNSPECIFIED LATERALITY: ICD-10-CM

## 2025-03-06 DIAGNOSIS — I25.118 CORONARY ARTERY DISEASE OF NATIVE ARTERY OF NATIVE HEART WITH STABLE ANGINA PECTORIS: Chronic | ICD-10-CM

## 2025-03-06 DIAGNOSIS — C34.81 MALIGNANT NEOPLASM OF OVERLAPPING SITES OF RIGHT LUNG: ICD-10-CM

## 2025-03-06 DIAGNOSIS — C34.91 SQUAMOUS CELL CARCINOMA OF RIGHT LUNG: Chronic | ICD-10-CM

## 2025-03-06 DIAGNOSIS — K22.0 ACHALASIA: ICD-10-CM

## 2025-03-06 DIAGNOSIS — J96.11 CHRONIC RESPIRATORY FAILURE WITH HYPOXIA: ICD-10-CM

## 2025-03-06 DIAGNOSIS — Z00.00 ENCOUNTER FOR PREVENTIVE HEALTH EXAMINATION: Primary | ICD-10-CM

## 2025-03-06 DIAGNOSIS — T45.1X5S PERIPHERAL NEUROPATHY DUE TO AND NOT CONCURRENT WITH CHEMOTHERAPY: ICD-10-CM

## 2025-03-06 DIAGNOSIS — I70.0 ATHEROSCLEROSIS OF AORTA: Chronic | ICD-10-CM

## 2025-03-06 DIAGNOSIS — C50.919 PRIMARY MALIGNANT NEOPLASM OF BREAST WITH METASTASIS: Chronic | ICD-10-CM

## 2025-03-06 DIAGNOSIS — R73.03 PREDIABETES: ICD-10-CM

## 2025-03-06 DIAGNOSIS — G62.0 PERIPHERAL NEUROPATHY DUE TO AND NOT CONCURRENT WITH CHEMOTHERAPY: ICD-10-CM

## 2025-03-06 DIAGNOSIS — R91.1 PULMONARY NODULE: ICD-10-CM

## 2025-03-06 DIAGNOSIS — I10 PRIMARY HYPERTENSION: Chronic | ICD-10-CM

## 2025-03-06 DIAGNOSIS — J43.2 CENTRILOBULAR EMPHYSEMA: Chronic | ICD-10-CM

## 2025-03-06 PROBLEM — G61.82 MULTIFOCAL MOTOR NEUROPATHY: Status: RESOLVED | Noted: 2025-02-25 | Resolved: 2025-03-06

## 2025-03-06 PROBLEM — M79.2 PERIPHERAL NEUROPATHIC PAIN: Status: ACTIVE | Noted: 2024-11-17

## 2025-03-06 PROBLEM — E87.6 HYPOKALEMIA: Status: RESOLVED | Noted: 2022-04-06 | Resolved: 2025-03-06

## 2025-03-06 PROBLEM — T45.1X5A NEUROPATHY DUE TO CHEMOTHERAPEUTIC DRUG: Status: ACTIVE | Noted: 2024-11-17

## 2025-03-06 PROBLEM — Z92.21 PERSONAL HISTORY OF CHEMOTHERAPY: Status: ACTIVE | Noted: 2023-03-21

## 2025-03-06 PROCEDURE — 25500020 PHARM REV CODE 255: Mod: HCNC | Performed by: INTERNAL MEDICINE

## 2025-03-06 PROCEDURE — 99999 PR PBB SHADOW E&M-EST. PATIENT-LVL V: CPT | Mod: PBBFAC,HCNC,, | Performed by: NURSE PRACTITIONER

## 2025-03-06 PROCEDURE — 71260 CT THORAX DX C+: CPT | Mod: 26,HCNC,, | Performed by: RADIOLOGY

## 2025-03-06 PROCEDURE — 71260 CT THORAX DX C+: CPT | Mod: TC,HCNC

## 2025-03-06 RX ORDER — PREGABALIN 75 MG/1
75 CAPSULE ORAL 2 TIMES DAILY
COMMUNITY
Start: 2025-02-27

## 2025-03-06 RX ORDER — ISOSORBIDE MONONITRATE 30 MG/1
TABLET, EXTENDED RELEASE ORAL
Qty: 90 TABLET | Refills: 3 | Status: SHIPPED | OUTPATIENT
Start: 2025-03-06

## 2025-03-06 RX ADMIN — IOHEXOL 75 ML: 350 INJECTION, SOLUTION INTRAVENOUS at 08:03

## 2025-03-06 NOTE — PROGRESS NOTES
Population Health Chart Review & Patient Outreach Details      Additional Pop Health Notes:    HM and immunization's reviewed and updated.  DUE FOR Dexa Scan.  IF ALREADY DONE, NEED TO GET RECORDS INFORMATION.  Place in visit note to advise.            Updates Requested / Reviewed:      Updated Care Coordination Note, Care Everywhere, and Care Team Updated         Health Maintenance Topics Overdue:      VB Score: 0     Patient is not due for any topics at this time.    Tetanus Vaccine  Shingles/Zoster Vaccine  RSV Vaccine                  Health Maintenance Topic(s) Outreach Outcomes & Actions Taken:    Osteoporosis Screening - Outreach Outcomes & Actions Taken  : Place in visit note to advise.

## 2025-03-06 NOTE — PROGRESS NOTES
"  Ade Castellano presented for a  Medicare AWV and comprehensive Health Risk Assessment today. The following components were reviewed and updated:    Medical history  Family History  Social history  Allergies and Current Medications  Health Risk Assessment  Health Maintenance  Care Team         ** See Completed Assessments for Annual Wellness Visit within the encounter summary.**         The following assessments were completed:  Living Situation  CAGE  Depression Screening  Timed Get Up and Go  Whisper Test  Cognitive Function Screening  Nutrition Screening  ADL Screening  PAQ Screening      Opioid documentation:  Patient does not have a current opioid prescription.        Vitals:    03/06/25 0909   BP: 106/64   Pulse: 65   Resp: 20   Temp: 98.5 °F (36.9 °C)   TempSrc: Oral   SpO2: 98%   Weight: 69.5 kg (153 lb 3.5 oz)   Height: 5' 3" (1.6 m)     Body mass index is 27.14 kg/m².  Physical Exam  Vitals and nursing note reviewed.   Constitutional:       General: She is not in acute distress.     Appearance: Normal appearance. She is well-developed and well-groomed. She is not ill-appearing.   HENT:      Head: Normocephalic and atraumatic.      Right Ear: External ear normal.      Left Ear: External ear normal.      Nose: Nose normal.      Mouth/Throat:      Lips: Pink.      Mouth: Mucous membranes are moist.   Eyes:      General: Lids are normal. Vision grossly intact. Gaze aligned appropriately.      Conjunctiva/sclera: Conjunctivae normal.   Neck:      Trachea: Phonation normal.   Pulmonary:      Effort: Pulmonary effort is normal. No accessory muscle usage or respiratory distress.   Abdominal:      General: Abdomen is flat. There is no distension.   Musculoskeletal:      Cervical back: Neck supple.   Skin:     General: Skin is warm and dry.      Findings: No rash.   Neurological:      General: No focal deficit present.      Mental Status: She is alert and oriented to person, place, and time. Mental status is at " baseline.      Motor: No abnormal muscle tone.      Gait: Gait normal.   Psychiatric:         Attention and Perception: Attention and perception normal.         Mood and Affect: Mood and affect normal.         Speech: Speech normal.         Behavior: Behavior normal. Behavior is cooperative.         Thought Content: Thought content normal.         Cognition and Memory: Cognition and memory normal.         Judgment: Judgment normal.               Diagnoses and health risks identified today and associated recommendations/orders:    1. Encounter for preventive health examination  Health maintenance reviewed and up to date, will f/u with PCP for routine preventative care  Review for Opioid Screening: Pt does not have Rx for Opioids   Review for Substance Use Disorders: Patient does not use substance     2. Squamous cell carcinoma of right lung  Treated with chemotherapy, closely followed by  hem/onc, continue current surveillance regimen      3. Malignant neoplasm of overlapping sites of right lung  Treated with chemotherapy, closely followed by  hem/onc, continue current surveillance regimen     4. Metastatic breast cancer  Treated with chemotherapy, closely followed by Dr.Jancich ledbetter/onc, continue current surveillance regimen     5. Peripheral neuropathy due to and not concurrent with chemotherapy  Stable with pregablin daily post chemo, continue neurology plan     6. Centrilobular emphysema  Breathing stable without acute exacerbation with intermittent home oxygen, compliant with trelegy daily, continue current regimen     7. Chronic respiratory failure with hypoxia  Breathing stable without acute exacerbation with intermittent home oxygen, compliant with trelegy daily, continue current regimen     8. Coronary artery disease of native artery of native heart with stable angina pectoris  Symptoms controlled with chronic vasodilation, on IMDUR daily  Reviewed diet, exercise and weight  control.  Recommended sodium restriction.  Cardiovascular risk and specific lipid/LDL goals reviewed.  Use of aspirin to prevent MI and TIA's discussed.  Use and side effects of nitroglycerine reviewed, call 911 if chest pain unrelieved by 3 tablets.  For claudication, exercise to point of pain, rest, then continue.    9. Chronic heart failure with preserved ejection fraction  Recommendations: decrease sodium in the diet, elevate feet above the level of the heart whenever possible, increase physical activity, use of compression stockings, and weight loss.  The patient was also instructed to call IMMEDIATELY (i.e., day or night) if any cardiopulmonary symptoms occur, especially chest pain, shortness of breath, dyspnea on exertion, paroxysmal nocturnal dyspnea, or orthopnea, and these were explained.    10. Atherosclerosis of aorta  Uncomplicated and asymptomatic, BP controlled, on statin with ASA chronic anticoagulation     11. Primary hypertension  Continue current treatment regimen.  Dietary sodium restriction.  Regular aerobic exercise.  Weight loss.  Patient Education: Reviewed risks of hypertension and principles of treatment.    12. Prediabetes  The patient is asked to make an attempt to improve diet and exercise patterns to aid in medical management of this problem.  Education: Reviewed ABCs of diabetes management (respective goals in parentheses):  A1C (<7), blood pressure (<130/80), and cholesterol (LDL <100).    13. Achalasia  Continue PPI daily   Nonpharmacologic treatments were discussed including: eating smaller meals, elevation of the head of bed at night, avoidance of caffeine, chocolate, nicotine and peppermint, and avoiding tight fitting clothing.      Provided Ade with a 5-10 year written screening schedule and personal prevention plan. Recommendations were developed using the USPSTF age appropriate recommendations. Education, counseling, and referrals were provided as needed. After Visit  Summary printed and given to patient which includes a list of additional screenings\tests needed.    No follow-ups on file.    Sofía Connell, IVAN  I offered to discuss advanced care planning, including how to pick a person who would make decisions for you if you were unable to make them for yourself, called a health care power of , and what kind of decisions you might make such as use of life sustaining treatments such as ventilators and tube feeding when faced with a life limiting illness recorded on a living will that they will need to know. (How you want to be cared for as you near the end of your natural life)     X  Patient has advanced directives on file, which we reviewed, and they do not wish to make changes.

## 2025-03-06 NOTE — PATIENT INSTRUCTIONS
Counseling and Referral of Other Preventative  (Italic type indicates deductible and co-insurance are waived)    Patient Name: Ade Castellano  Today's Date: 3/6/2025    Health Maintenance       Date Due Completion Date    TETANUS VACCINE Never done ---    Shingles Vaccine (1 of 2) Never done ---    RSV Vaccine (Age 60+ and Pregnant patients) (1 - 1-dose 75+ series) Never done ---    COVID-19 Vaccine (3 - Moderna risk series) 12/30/2021 12/2/2021    DEXA Scan 03/20/2025 3/20/2023    Hemoglobin A1c (Prediabetes) 11/20/2025 11/20/2024    Lipid Panel 05/21/2029 5/21/2024        No orders of the defined types were placed in this encounter.    The following information is provided to all patients.  This information is to help you find resources for any of the problems found today that may be affecting your health:                  Living healthy guide: www.[facility].[place].[facility]      Understanding Diabetes: www.diabetes.org      Eating healthy: www.cdc.gov/healthyweight      CDC home safety checklist: www.cdc.gov/steadi/patient.html      Agency on Aging: www.goea.[place].[facility]      Alcoholics anonymous (AA): www.aa.org      Physical Activity: www.anca.nih.gov/ep4hdpo      Tobacco use: www.quitwithusla.org

## 2025-03-06 NOTE — TELEPHONE ENCOUNTER
Refill Routing Note   Medication(s) are not appropriate for processing by [facility] for the following reason(s):        Outside of protocol  IMDUR is prescribed for as needed use    [facility] action(s):  Route               Appointments  past 12m or future 3m with PCP    Date Provider   Last Visit   2/25/2025 Jeannine Huitron MD   Next Visit   4/14/2025 Jeannine Huitron MD   ED visits in past 90 days: 0        Note composed:5:47 AM 03/06/2025

## 2025-03-09 NOTE — PROGRESS NOTES
Subjective:       Patient ID: Ade Castellano is a 79 y.o. female.    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 The PET(+) disease in the right axilla and right supraclavicular fossa will be boosted at 200 cGy per fraction to 6000 cGy total dose.  S/p LYMPH NODE, RIGHT AXILLA, BIOPSY: 6/16/21 . Metastatic poorly-differentiated carcinoma, c/w breast primary ERnegPRneg Her2 neg  PD-L1 (22C3) IHC CPS> is greater than or equal to 10  Pembrolizumab 7/22/21 -6/15/22         Prior Hx:  80 y/o female with history of  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  And Rt  breast Haja/p  cycle 6 of carboplatin/Abraxane 7/2/2018   She has  History of Stage 1A  Rt breast cancer Grade 3, ER/ME neg , Her 2 jose maria neg s/p lumpectomy 7/11/2014 and s/p adjuvant RT 9/2014 Pt declined Adjuvant chemo.Pathology showed a 1.15 cm, grade 3 infiltrating ductal carcinoma.  One sentinel lymph node was negative for malignancy.  Margins were negative and the closest margin was 1 cm.  She was staged  pT1c  pN0 cM0 stage IA.  She declined adjuvant chemo therapy.  She completed post operative radiation therapy at 400 cGy per fraction to 4000 cGy 9/2014  Pt hospitalized 11/2017 for  acute on chronic respiratory failure requiring intubation and ventilation. Has large lung mass in right lung with probable post obstructive pneumonia resulting in COPD exacerbation.CTA  chest 11/29/2017 revealed   Large right hilar mass with involvement of adjacent segmental pulmonary arterial branches and bronchi with associated postobstructive atelectasis and volume loss in the RML  with adjacent ground glass opacity concerning for underlying neoplastic process. Mediastinal and axillary adenopathy, with a right axillary lymph node measuring up to 2.0 cm in short axis diameter.She underwent rt axillary LN bx at outside facility- on 1/16/2018 at [facility]. Pathology revealed metastatic poorly differentiated carcinoma of unknown primary site. She next underwent lung bx at [facility] 1/31/2018  benign lung tissue. Repeat Right Lung Bx 2/14/2018 Pathology reveals Squamous cell carcinoma PD-L1 10% low expression EGFR NEG ALK NEG BRAF NEG. Outside slides axillary LN specimen were reviewed/comparison to lung bx findings . She completed    cycle 6 of carboplatin/Abraxane completed 7/2018 .PET/CT  4/19/2018 revealed there has been a excellent response to therapy.  At least 90% reduction in malignant activity.PET/CT 2/21/2019 increased size and irregularity of the right axillary lymph node with increased FDG avidityMAMMO/US rt breast 2/2019 revealed suspicious findings rt axilla LN.  Right axilla, mass, core biopsy: Positive for poorly differentiated carcinoma breast primary ER neg/NH neg Her2 neg.[facility] for outside reviewIt was determined  the lung tumor corresponds to a squamous cellcarcinoma and appears to be unrelated to the invasive ductal carcinoma of the breast. The axillary mass, in myopinion, corresponds to metastases of the breast primary. Although it is a poorly differentiated carcinoma, theimmunophenotype is most consistent with the one that the breast primary exhibited, and is inconsistent with metastatic squamous cell carcinoma. She was treated with  AC ( adriamycin 60m/m2/Cytoxan 600mg/m2)  q21d x 4 cycles completed 9/6/2019 . PET/CT 9/19/2019 shows disease response l decrease in  size and uptake of several right axillary lymph nodes and a supraclavicular lymph node.  Mild residual activity may indicate viable tumor.Pt followed by Rad/Onc. She was presented at [facility] tumor board and it was determined to proceed Radiation therapy only. No ALND due to concurrent lung and supraclavicular node. She  completed  RT 12/16/2019  To right axilla and right supraclavicular region PET/CT imaging  5/20/21 shows Continued increase in both size and hypermetabolic activity of right axillary level 1 lymph nodes a new, mildly hypermetabolic cutaneous thickening of the right breast. she underwent LYMPH NODE, RIGHT AXILLA, BIOPSY: 6/16/21 . Pathology showed Metastatic poorly-differentiated carcinoma, consistent with breast primary-ERneg/ PRneg  HER2  neg  Pt started on pembrolizumab 7/2021  Pt hospitalized 4/6/22 with hypokalemia and orthostatic hypotension  S/p C16 pembrolizumab 6/15/22  ( 400mg q6wks)  hospitalized with  dizziness and possible TIA   Patients etiology appears to be secondary to small vessel disease. Recommendations were to continue Aspirin 81 mg daily, plavix 75 mg for 21 days followed by ASA 81 mg monotherapy thereafter.     Interval Hx:   She reports worsening LOGAN  She wears supp 02 prn  She is tearful during visit  She does not want to wear 02 out of house fear of stigma  Pox RA 96 %   Reports chronic cough worse recently intermittently prod  No fevers  She is now f/b pulmonology, Dr. Masterson chronic hypoxemic respiratory failure 2/2 copd   She continues with chronic LBP , stable  Appetite and weight stable  She has chronic SUNITHA , untreated, declines titration study      CT chest w/con 3/6/25 shows  Interval development of bilateral subsegmental consolidative opacities and scattered clustered centrilobular pulmonary nodules, likely sequela of a small airways infectious or inflammatory process.     PrevHx:She had an abnormal mammogram 6/4/2014 whichRevealed a round solid mass 6mm in  "rt breast.She then underwent U/S guided core bx of rt breast mass on 6/17/2014.Pathology revealed infiltrating ductal carcinoma Grade 3 with tumorPresent in thin-walled spaces suggestive of lymphatic spaces. HormoneReceptor status on tumor specimen revealed ER negative 0% ,MT negative 0% and Her 2 jose maria negative.She subsequently underwent rt segmental mastectomy and SLN bxOn 7/11/2014. Pathology of rt breast lumpectomy revealed invasiveDuctal carcinoma with micropapillary pattern ( Invasive micropapillaryCa) with max tumor dimension 11.5mm with suggestion of tumor in Thin walled spaces c/w lymphovascular involvement. SurgicalMargins free of tumor, grade 3, ER/MT neg , Her 2 jose maria neg With [place] Lymph node negative for Neoplasm. Pathologic staging [id number](i-)Her mother was diagnosed with breast cancer in her 50's/        Review of Systems   Constitutional: Positive for mild  fatigue, stable No  activity change,  fever.   HENT:  Negative for mouth sores, rhinorrhea.  Eyes: Negative for visual disturbance.   Respiratory: Positive for chronic  LOGAN,Negative for wheezing.    Cardiovascular: Negative for chest pain.   Gastrointestinal:  Negative for abdominal pain and diarrhea.   Genitourinary: Negative for frequency.   Musculoskeletal: Positive for chronic LBP, stable   Skin: Negative for rash.   Neurological: Negative for dizziness and headaches.   Hematological: Negative for adenopathy.   Psychiatric/Behavioral: The patient is not nervous/anxious.        Objective:        Vitals:    03/10/25 0816   BP: (!) 114/59   BP Location: Left arm   Patient Position: Sitting   Pulse: (!) 49   Temp: 98 °F (36.7 °C)   SpO2: 96%   Weight: 69.8 kg (153 lb 14.1 oz)   Height: 5' 3" (1.6 m)                 CT a/p w/contrast 11/29/2018   1. No acute abnormality identified within the abdomen and pelvis.    2.  There are a few nonspecific prominent lymph nodes in the upper abdomen including a periesophageal lymph node which measures 1.0 cm " in short axis diameter.    3.  Significant abdominal aortic atherosclerosis and abdominal aorta ectasia.    4.  Additional findings as above.    PET/CT 1/26/2018   1.  Intense FDG uptake within the known right infrahilar lung mass, compatible with malignancy.  It is unclear if this represents primary lung malignancy or metastatic breast cancer.    2.  Intensely hypermetabolic right axillary, retropectoral, and lower right cervical lymph nodes, compatible with metastases.    3.  Abnormal FDG uptake along right breast skin thickening, new from prior chest CTA and mammogram.  This may relate to localized edema/inflammation, though correlation with physical exam and mammography are recommended to exclude underlying inflammatory carcinoma.      MRI Brain w w/out contrast 2/9/2018  1.  No evidence of intracranial metastases.  2.  Sinus disease       Rt axillary LN bx at outside facility- on 1/16/2018 at [facility]  Pathology revealed metastatic poorly differentiated carcinoma of unknown primary  site 1/16/2018 at [facility]  TTF-1 negative, napsin negative  , cytokeratin 7 positive, cytokeratin 20 negative, p63 negative, cytokeratin 5/6 focal dim positivity    LUNG BIOPSY 1/31/2018  Pathology revealed benign lung tissue         SPECIMEN  1) Right Lung Bx 2/14/2018  Supplemental Diagnosis  Immunohistochemical stains show strong nuclear staining for p63 in essentially all tumor cells and very strong  cytoplasmic and membrane staining for CK5/6, also in essentially all tumor cells. TTF-1 and CK7 are negative  within tumor cells but do stain native pulmonary elements present within the biopsy. A stain for mucicarmine is  negative. Positive and negative controls function appropriately.  Final diagnosis: Specimen submitted as right lung biopsy  -Squamous cell carcinoma  (Electronically Signed: 2018-02-20 09:12:07 )  Diagnosed by: Phong Thompson  FINAL PATHOLOGIC DIAGNOSIS  Fragments of pulmonary  parenchyma (submitted as right lung biopsy):    FINAL PATHOLOGIC DIAGNOSIS 1. Lymph node, right axilla, biopsy, review of 10 outside slides [facility], JS 18 1 821,  collected January 11, 2018: Metastatic poorly differentiated carcinoma (see comment).  The histologic section shows fibrous stroma infiltrated by nest of poorly differentiated malignant cells including  occasional dyskeratotic cells morphologically suggestive of metastatic squamous cell carcinoma.  Immunohistochemical stains are nonspecific; the cells are positive for cytokeratin 7 and cytokeratin 5/6 (focal) and  negative for cytokeratin 20, TTF-1, p63, GCDFP, mammoglobin, and CEA        PET/CT 2/21/2019  Increased size and irregularity of the right axillary lymph node with increased FDG avidity.  Although this would be atypical in location for lung carcinoma, the increased size and avidity must be concerning for metastatic disease in this patient with history of squamous cell lung cancer.     US Rt breast and mammo 2/26/2019  Impression:  Right  Lymph Node: Right axilla lymph node. Assessment: 4 - Suspicious finding. Biopsy is recommended.      BI-RADS Category:   Right: 4 - Suspicious  Overall: 4 - Suspicious     Recommendation:  Biopsy is recommended. Biopsy of the lymph node measuring 1.4 x 1.1 x 1.3 recommended , the one with the round shape configuration, corresponding to the most recent PET CT finding.         Pathology 3/11/2019   FINAL PATHOLOGIC DIAGNOSIS  Right axilla, mass, core biopsy:  Positive for poorly differentiated carcinoma.  See comment.  Comment:  The biopsy from the right axilla mass shows a poorly differentiated carcinoma in background lymphoid tissue  with enlarged cells showing increased nuclear size, prominent nucleoli, moderate amounts of cytoplasm and mitotic  figures. Immunostains are completed and reveal the tumor cells to stain positively with cytokeratin AE 1/AE3, CK  5/6 and CK 7. The tumor cells are  negative for CK 20, Estrogen receptor, p63 and TTF-1. All stains have  satisfactory positive and negative controls. The patient's prior cases will be reviewed and an additional stain for  JUAREZ-3 is pending in an attempt to pinpoint the primary site of this malignancy and results will follow in a  supplemental report.      Supplemental Diagnosis  3/11/2019  The current axillary mass is compared to the patient's prior right lung biopsy (case number [id number]) and the  current axillary mass is morphologically similar to the lung malignancy. Also, the current axillary mass is compared  to the patient's prior breast resection (Case number [id number]) and appears similar as well. P63 is negative in the  [id number] tumor and CK 5/6 shows scattered positive staining in the [id number] tumor.  Immunostain for JUAREZ-3 is completed and shows only rare weak to moderate staining within the tumor cell nuclei  with satisfactory positive and negative controls. This stain is positive in the surrounding lymphocytes. This staining  pattern is non-specific and does not definitively differentiate between a lung and breast primary malignancy in this  right axilla mass biopsy. The staining profile of the current axillary mass match more closely with the previous  breast carcinoma (due to the p63 negativity in both the 2014 breast cancer and the current axillary mass lesion but  p63 positivity in the lung mass from 2018).  This staining profile, together with the comparison with the patient's prior breast tumor and prior lung tumor supports  a diagnosis of poorly differentiated carcinoma and the malignancy appears morphologically similar to the prior  malignancies from both sites, but the staining profile in this small sample from the axillary mass more closely  correlates with the prior breast malignancy. Pathologic subclassification of this malignancy's primary location is not  definitive and clinical correlation is recommended definitively  decide on possible primary location in this patient's  axillary mass sample.  Supplemental (2):  Additional immunohistochemical staining for progesterone receptor and HER2 are completed per clinician request  with following results:  Progesterone receptor: Negative; 0% nuclear tumor cell staining.  HER2: Negative; stain score = 0.  Supplemental (3):  [place] DIAGNOSIS:  FINAL DIAGNOSIS  Breast, right, needle core biopsy ([id number]; 06/17/2014): Invasive ductal carcinoma, Jaiden grade III (of  III), with focal micropapillary features.  Immunohistochemical stains performed at the referring institution show that the tumor cells have the following  phenotype: - Estrogen receptor: Negative (0% tumor cells staining). - Progesterone receptor: Negative (0% tumor  cells staining). - HER2: Negative (score 0).  Lymph nodes, right, sentinel biopsy and lumpectomy ([id number]; 07/11/2014):  1. Right sentinel lymph node: A single (1) lymph node is negative for metastatic carcinoma.  Immunohistochemical stains performed by the referring institution and reviewed at [facility] for cytokeratin are  negative, confirming the diagnosis.  2. Right breast: Invasive ductal carcinoma with micropapillary features, per report 11.5 mm in greatest dimension.  Foci suspicious for lymphovascular invasion identified. Margins of resection are free of tumor.  Immunohistochemical stains performed by the referring institution and reviewed at [facility] show that the tumor  cells are negative for p63 and show patchy positivity for CK 5/6.  Lung, right, needle core biopsy ([id number]; 02/14/2018): Squamous cell carcinoma.    Immunohistochemical stains performed by the referring institution show the tumor cells are strongly positive for p63  and CK 5/6, while negative for TTF-1 and CK7. These result support the diagnosis. Axilla, right, needle core biopsy  ([id number]; 03/11/2019): Lymph node positive for metastatic carcinoma, most consistent with  breast primary  (see comment).  Immunohistochemical stains performed by the referring institution and reviewed at [facility] show that the tumor  cells are negative for p63, focally positive for CK 5/6, focally and weakly positive for JUAREZ-3, and strongly positive  for CK7. They are also negative for estrogen receptor, progesterone receptor, and HER2 JAM (score 0).  COMMENT:  Thank you for allowing me to review the case of this 73-year-old lady who recently underwent needle core biopsies  of a right axillary mass and who has a previous diagnosis of an invasive ductal carcinoma of the right breast, as well  as a squamous cell carcinoma of the lung. I am reviewing this case because of my special interest in breast  pathology.  I agree with your interpretation of this case. In my opinion, the lung tumor corresponds to a squamous cell  carcinoma and appears to be unrelated to the invasive ductal carcinoma of the breast. The axillary mass, in my  opinion, corresponds to metastases of the breast primary. Although it is a poorly differentiated carcinoma, the  immunophenotype is most consistent with the one that the breast primary exhibited, and is inconsistent with a  metastatic squamous cell carcinoma. I did share the case with Dr. Anabel Stone, one of our pulmonary pathologists,  and she concurs with this interpretation.  Note: Report attached.  Performing location:  [address] [address]      LYMPH NODE, RIGHT AXILLA, BIOPSY: 6/16/21   - Metastatic poorly-differentiated carcinoma, consistent with breast primary  -ER, [place], and HER2 immunohistochemical hormonal markers are also negative  PD-L1 (22C3) SemiQuant IHC, Manual  Interpretation  Right axillary lymph node , specimen for PD-L1 immunohistochemistry studies (clone 22C3, [employer] [facility]  [employer], [place], [place]; using a proprietary detection system) ([id number]):  Reported carcinoma site of origin:  Breast  Result: The percent of PD-L1 positive cells based upon the total number of tumor cells (combined positive  score, CPS) is greater than or equal to 10.  Interpretation: Studies suggest that positive PD-L1 immunohistochemistry in tumor cells and/or tumorassociated  immune cells may predict tumor response to therapy with immune checkpoint inhibitors. This  result should not be used as the sole factor in determining treatment, as other factors (for example, tumor  mutation burden, and microsatellite instability) have been also studied as predictive markers.          CT neck/chest/abd/pelvis w/contrast 4/26/2019   The previously described right hilar mass is no longer visible.  There is persistent volume loss in the right middle lobe this appears similar to the prior PET-CT February 21, 2019.    Persistent abnormal right axillary node today measuring about 15 mm compared 18 mm prior.  The positioning of the adjacent nodes is slightly different likely accounting for the slight difference in measurement.    Cluster of tree-in-bud nodular densities left lower lobe series 2, image 93.  This may be related to small airways disease though serial follow-up suggested.      PET/CT 6/20/2019   New and worsening hypermetabolic lymph nodes concerning for metastatic breast cancer as described above.  Tissue sampling would be required for definitive diagnosis.      2-D echo 6/27/2019   Normal left ventricular systolic function. The estimated ejection fraction is 55%  Concentric left ventricular remodeling.  Normal LV diastolic function.  Normal right ventricular systolic function.  Moderate left atrial enlargement.  Mild right atrial enlargement.  Mild aortic regurgitation.  Mild mitral regurgitation.  Mild tricuspid regurgitation.  Normal central venous pressure (3 mm Hg).  The estimated PA systolic pressure is 25 mm Hg      PET/CT 9/19/2019   Favorable response to therapy with interval decrease in size and uptake of several  right axillary lymph nodes and a supraclavicular lymph node.  Mild residual activity may indicate viable tumor.    No new hypermetabolic findings worrisome for malignancy.    PET/CT 2/28/2020  1.  No evidence of hypermetabolic tumor.  Continued improvement of the right axilla status post adjuvant radiation.    2.  No new hypermetabolic lesions      CTA 6/17/2020  1. No evidence of pulmonary embolus. No aortic dissection.   2. There is no evidence for new or progressive disease in the chest as compared to PET/CT 6/20/2019 in this patient with history of right breast cancer and right lung neoplasm. The patient does have a more recent PET/CT 2/28/2020 from an outside facility per the electronic medical record although images are not available for direct comparison. Correlation with these images may be beneficial. Continued long-term surveillance recommended.  3. Stable appearance of the treated tumor in the right hilum/middle lobe.  4. Stable treated right breast cancer and axillary adenopathy without evidence for developing breast mass or new right axillary adenopathy.  5. Stable tiny nodularity/tree-in-bud densities in the left lung, probably postinfectious or inflammatory.  6. Wall thickening of the esophagus may be correlated for esophagitis. Possible tiny hiatus hernia.  7. Slight bronchial wall thickening may be correlated for bronchitis.  8. Mild to moderate emphysema as before.  9. Reflux of contrast into the IVC and hepatic veins is nonspecific but may be correlated for elevated right heart pressures.  10. Coronary calcifications and/or stents similar to prior.    Echo 5/21/2020  Technically difficult study.   Normal left ventricular systolic function.   Left ventricular ejection fraction is estimated at 55%.   Severe mitral annular calcification.   Mild mitral valve regurgitation.   Aortic valve sclerosis without stenosis or regurgitation.     PFTs 6/17/2020  Spirometry reveals a very severe obstructive lung  defect, which does not significantly improve post bronchodilators. Lung volumes revealed air trapping. Diffusing capacity was moderately impaired.        Del 6/26/2020  BI-RADS Category:   Overall: 2 - Benign      PET/CT 10/23/2020   Impression:     Stable mildly hypermetabolic right axillary lymph node/nodular density contralateral to the site of injection.  Differential considerations include metastatic lymph node, reactive lymph node from benign right upper extremity process, and fat necrosis.  Recommend physical examination of the upper extremity and consideration of ultrasound for further evaluation of the node/nodule and possible image guided biopsy.  Otherwise, attention on follow-up.     Otherwise, no other hypermetabolic disease identified      Mammo diagnostic right /US 11/4/2020   Impression:   1. No suspicious finding either on mammogram or ultrasound at the site of the palpable lump in the right breast at 10:00 o'clock. A bilateral mammogram is recommended in June 2020 to return to the patient to annual screening.   2. Redemonstration of the morphologically abnormal lymph node in the right axilla that contains a biopsy clip, compatible with the known recurrence metastatic breast cancer that has been treated with chemotherapy. The appearance of this lymph node is unchanged from 06/26/2020 and overall appears smaller when compared to 03/11/2019.     Abd US 12/11/2020   Impression:     1. Echogenic liver likely representing hepatic steatosis.  2. Right upper quadrant ultrasound otherwise is unremarkable.     PET/CT 10/14/21     Impression:     1.  No definite evidence of hypermetabolic tumor.  Interval resolution of hypermetabolic right axillary lymph nodes.  No new hypermetabolic findings.     2.  Persistent low-grade diffuse cutaneous uptake which is nonspecific.    MRI shoulder w w/out contrast 1/26/22   Impression:     Mild edema and postcontrast enhancement at the myotendinous junction of the  supraspinatus and infraspinatus, it is nonspecific and could be due to degenerative change or tendinosis.     Small area of interstitial tear at the footprint insertion of the infraspinatus tendon.     No large rotator cuff tear.     No advanced degenerative change.     No osseous lesions.     PET/CT 1/26/22  Impression:In this patient with breast cancer, there is no evidence of hypermetabolic tumor to suggest recurrent or metastatic disease.   Less extensive but, nonspecific, low-grade diffuse cutaneous uptake of the right breast.       PET/CT 4/27/22  Impression:     1.  No FDG avid disease to suggest recurrence or metastatic disease.     Unchanged right breast skin thickening with mild FDG uptake.       PET/CT 7/13/22   Impression:     In this patient with lung and breast cancer, there is new left lower lobe opacification with mild radiotracer uptake which may represent aspiration, pneumonia, or malignancy.  Tissue sampling would be required for definitive diagnosis.     Unchanged right breast skin thickening with mild radiotracer uptake.    CT chest w/contrast 8/25/22    Decreasing patchy pulmonary consolidation within the left lower lobe when compared to prior PET-CT scan dated 07/13/2022.  The overall appearance of the patchy consolidation as well as the moderate improvement when compared to the prior study suggest a benign etiology such as left lower lobe pneumonia.     Persistent band of atelectasis/scarring within the right middle lobe, stable dating back to 04/26/2019.     Coronary artery atherosclerosis in a multi vessel distribution.     There are no measurable lesions per RECIST criteria.    PET /CT  11/21/22 shows New right lower lobe infiltrate worrisome for pneumonia or aspiration.Chronic infiltrate right middle lobe.   Resolution of the left lower lobe infiltrate.    Mammo 7/26/22  BI-RADS Category:   Overall: 2 - Benign        CT chest with contrast 11/21/22    Impression:     New right lower lobe  infiltrate worrisome for pneumonia or aspiration.     Chronic infiltrate right middle lobe.     Resolution of the left lower lobe infiltrate.     Coronary artery calcifications.         PET/CT 1/11/23  Impression:     1. In this patient with lung and breast cancer, there is stable right breast skin thickening with mild radiotracer uptake.  2. Interval resolution of hypermetabolic opacification in the left lower lobe as compared to FDG PET-CT 07/14/2022.  Interval improvement in patchy opacities in the right lower lobe as compared to CT 11/21/2022    .                Electronically Signed By: Tapan Young MD 4/16/2023 23:17 CDT, [facility]  Narrative    PET/CT 4/14/23  [facility]  HISTORY:       Malignant neoplasm of female breast, unspecified estrogen receptor status, unspecified laterality, unspecified site of breast (CMS/HCC).       ICD10:  C50.919   Malignant neoplasm of female breast, unspecified estrogen receptor status, unspecified laterality, unspecified site of breast (CMS/HCC)       REFERENCE EXAMS:     7/13/2022 [facility] CT [facility] (no images, report only)     6/20/2019 PET CT ([facility])       TECHNIQUE:     PET/CT with attenuation correction.     Dose:  13.62 mCi of FDG-18     Injection site:  left wrist     Field of view:  Skull base to thighs.     Arm position:  above head     Baseline glucose:  128 mg/dL       FINDINGS:       All SUV values are max SUV.     Head/Neck:     Physiologic uptake of radiotracer.         Thorax:     Right portacath.       Cutaneous thickening in the right breast (SUV=1.58).       Calcification of the mitral valve, similar to 6/20/2019.       Coronary artery atherosclerotic calcification.     Atelectasis/scarring along the inferior aspect of the right major fissure, similar to 6/20/2019.         Abdomen/Pelvis:     Physiologic uptake in the genitourinary system.     Physiologic uptake in the intestines.       Patchy atherosclerotic calcification in the  aorta and iliac arteries.       Impression      No opacity in the left lower lobe on the current exam to correspond to a left lower lobe opacity described on the 7/13/2022 PET CT report.  Images from the 7/13/2022 PET CT are not available at the time of this report.       Cutaneous thickening in the right breast with mild uptake.  There was a similar finding described on the 71/3/2022 PET CT report.           Electronically Signed By: Tapan Young MD 4/16/2023 23:17 CDT, [facility]          Mammo screening bilateral 8/7/23  BI-RADS Category: Overall: 2 - Benign     PET /CT ( NOT DOTATATE) 4/14/23 No opacity in the left lower lobe on the current exam to correspond to a left lower lobe opacity described on the 7/13/2022 PET CT report.  Images from the 7/13/2022 PET CT are not available at the time of this report.   Cutaneous thickening in the right breast with mild uptake.  There was a similar finding described on the 71/3/2022 PET CT report.       PET/CT 11/14/23    Impression:     Similar appearing right breast skin thickening with mild hypermetabolic activity.  No new focal abnormal tracer uptake elsewhere.      EXAMINATION: 3/19/24   NM PET CT FDG SKULL BASE TO MID THIGH     CLINICAL HISTORY:  Breast cancer, invasive, stage IV, assess treatment response; Malignant neoplasm of unspecified site of unspecified female breast     TECHNIQUE:  12.8 mCi of F18-FDG was administered intravenously in the left antecubital fossa.  After an approximately 60 min distribution time, PET/CT images were acquired from the skull base to mid thigh.  Transmission images were acquired to correct for attenuation using a whole body low-dose CT scan without IV contrast with the arms positioned above the head. Glycemia at the time of injection was 116 mg/dL.     COMPARISON:  NM PET-CT 11/14/2023, 01/11/2023     FINDINGS:  Quality of the study: Adequate.     In the head and neck, there are no hypermetabolic lesions  worrisome for malignancy. There are no hypermetabolic mucosal lesions, and there are no pathologically enlarged or hypermetabolic lymph nodes.  Prominent scattered physiologic muscular activity.     In the chest, there is similar appearing mild right breast skin thickening with decreased hypermetabolic activity now measuring 2.3 (image 103), previously SUV max 3.2.     There is a 3 mm left axillary lymph node with mild uptake, SUV max 2.6 (axial image 56), favored to represent reactive etiology.  Attention on follow-up.     There are no concerning pulmonary nodules or masses, and there are no pathologically enlarged or hypermetabolic lymph nodes.  Prominent physiologic tracer activity throughout thoracic musculature.     In the abdomen and pelvis, there is physiologic tracer distribution within the abdominal organs and excretion into the genitourinary system.  Similar mildly hypermetabolic focus in the 1st portion of the duodenum without CT correlate measuring SUV max 5.5 (image 134), possibly physiologic although nonspecific.     In the bones, there are no hypermetabolic lesions worrisome for malignancy.  Prominent focus of hypermetabolic uptake in the left piriformis without underlying CT correlate (image 220), presumably physiologic.     Additional findings: Right chest wall implanted tunnel central venous catheter tip terminating in the superior vena cava.  Stable punctate pulmonary micronodule in the left upper lobe (axial series 3, image 73).  Coronary arterial and valvular calcific plaque.  Advanced aortoiliac calcific plaque.     Impression:     Similar appearing mild right breast skin thickening with decreased hypermetabolic activity. No new abnormal tracer uptake elsewhere worrisome for malignancy.     Additional findings as above.    CT c/a/p 5/3/24   Impression:     No acute abdominopelvic abnormality.  Specifically, no evidence for acute pancreatitis as clinically questioned.     Vague area of  peripheral ground-glass with grouped micro-nodules at the peripheral right lung base with appearance suggesting sequela of infectious or non-infectious bronchiolitis.     Similar degree of asymmetric skin thickening at the right breast.  To correlate with mammographic and oncologic history.     Ectatic infrarenal abdominal aorta and additional findings as above.       Assessment:       1. Recurrent breast cancer, unspecified laterality    2. History of lung cancer    3. Pulmonary nodule    4. Bronchiolitis    5. Chronic respiratory failure with hypoxia                Plan:     1.,2.   Pt with hx of Stage 1A invasive ductal carcinoma rt breast s/p rt segmental mastectomy and SLN bx 7/11/2014 ER/AK neg HER 2 jose maria neg [id number](i-).  S/p adjuvant RT completed 9/2014 Pt declined adjuvant chemo  Pt  hospitalized 11/2017 with acute-on-chronic  resp failure  Abnormal CT imaging revealing Large right hilar mass with involvement of  adjacent segmental pulmonary arterial branches and bronchi with associated postobstructive atelectasis  and involvement of mediastinal and axillary adenopathy   S/p rt axillary LN bx ( outside facility) - metastatic poorly differentiated carcinoma of unknown primary  site  status post  lung biopsy 1/31/2017 -benign lung tissue  PET/CT 1/26/2018   Intense FDG uptake within the known right infrahilar lung mass, compatible with malignancy.   Intensely hypermetabolic right axillary, retropectoral, and lower right cervical lymph nodes, compatible with metastases.  Abnormal FDG uptake along right breast skin thickening, new from prior chest CTA and mammogram.  PET/CT 11/14/23 Similar appearing right breast skin thickening with mild hypermetabolic activity.  No new focal abnormal tracer uptake elsewhere.  Repeat lung bx 2/14/2018 revealed Squamous Cell CA lung PD-L1 10% EGFR NEGALK NEG  Pt treated for advanced squamous cell CA of lung She s/p  cycle 6 of carboplatin/Abraxane Day1 completed 7/2/2018 ( day  15 held due to prolonged cytopenias)  She completed therapy with near CR     PET/CT 2/21/2019 showed increased size and irregularity of the right axillary lymph node with increased FDG avidity     MAMMO/US rt breast 3/2019  reveals suspicious findings rt axilla LN.  Biopsy of the lymph node measuring 1.4 x 1.1 x 1.3     Pt s/p  rt axillary LN bxPositive for poorly differentiated carcinoma.- likely breast primary ERneg PRneg Her 2 neg    Outside review of specimen(s) revealed  lung tumor corresponds to a squamous cell carcinoma and appears to be unrelated to the invasive ductal carcinoma of the breast. It was determined The axillary mass, in my opinion, corresponded  to metastases of the breast primary. Although it is a poorly differentiated carcinoma, theimmunophenotype is most consistent with the one that the breast primary exhibited, and is inconsistent with a metastatic squamous cell carcinoma.     CT imaging 4/26/2019 persistent rt axillary LAD, no other sites of disease     Lymph node biopsy 3/11/2019 Right axilla, mass, core biopsy:Positive for poorly differentiated carcinoma.favor breast primary     PET/CT 6/20/2019  New and worsening hypermetabolic lymph nodes concerning for metastatic breast cancer     Previously Discussed  imaging findings in detail with patient which reveals new and worsening hypermetabolic melena metabolic lymph nodes including hypermetabolic supraclavicular node.  Therefore, at treatment option will be systemic chemotherapy in light of the recent imaging findings.  She will be followed by her surgical oncologist Dr. Christopher      IT was determined to proceed with systemic chemotherapy   S/p  AC [id number] x 3 wks x 4 wks completed 9/6/2019   ( pt has not received in past)      PET/CT 9/19/2019 shows disease response with interval decrease in size and uptake of several right axillary lymph nodes and a supraclavicular lymph node.  Mild residual activity may indicate viable tumor.No new  hypermetabolic findings worrisome for malignancy.    Pt followed by  Breast Surgery and plan was  for possible   ALND. Pt with Recurrent cancer in her right axilla and right supraclavicular fossa.   She completed chemotherapy 9/6/2019 with near CR  It was determined  Radiation will be given to the original areas of hypermetabolic activity in the right axilla and right supraclavicular region    Pt completed  RT 12/16/2019     PET/CT 1/14/21 shows   New right axillary hypermetabolic lymph nodes with preserved normal architecture suggestive of reactive nodes.  Stable appearing previously identified hypermetabolic lymph node with mild increase in surrounding fat stranding.        Findings previously d/w with treating breast surgeon and it was determined to cont to monitor and close f/u as pt is heavily pre treated, has received RT to site   Pt acknowledged understanding and agreeable with plan     PET/CT imaging  5/20/21 shows Continued increase in both size and hypermetabolic activity of right axillary level 1 lymph nodes a new, mildly hypermetabolic cutaneous thickening of the right breast.     Right breast, skin, punch biopsy:Negative for carcinoma    LYMPH NODE, RIGHT AXILLA, BIOPSY: 6/16/21   - Metastatic poorly-differentiated carcinoma, consistent with breast primary triple neg  PD-L1 immunohistochemistry studies(combined positive score, CPS) is greater than or equal to 10   PET/CT showed  No definite evidence of hypermetabolic tumor.  Interval resolution of hypermetabolic right axillary lymph nodes.  No new hypermetabolic findings.      S/p C16 pembrolizumab 6/15/22   Last  PET/CT imaging shows  new left lower lobe opacification with mild radiotracer uptake which may represent aspiration, pneumonia, or malignancy.  Recent follow-up imaging studies decreasing patchy pulmonary consolidation within the left lower lobe when compared to prior PET-CT scan dated 07/13/2022.  Plan to remain off of therapy   Follow-up  PET/CT imaging 1/11/23 Interval resolution of hypermetabolic opacification in the left lower lobe as compared to FDG PET-CT 07/14/2022.  Interval improvement in patchy opacities in the right lower lobe as compared to CT 11/21/2022   follow up PET imaging [id number] -  performed at [facility] .([facility] was not available at  since broken )No opacity in the left lower lobe on the current exam to correspond to a left lower lobe opacity described on the 7/13/2022 PET CT report.  Images from the 7/13/2022 PET CT are not available at the time of this report.       Follow up PET imaging 3/19/24 shows Similar appearing mild right breast skin thickening with decreased hypermetabolic activity. No new abnormal tracer uptake elsewhere worrisome for malignancy.  follow-up PET imaging 11/12/24 - SHERRILL recurrence  CT chest w/con 3/6/25 shows  Interval development of bilateral subsegmental consolidative opacities and scattered clustered centrilobular pulmonary nodules, likely sequela of a small airways infectious or inflammatory process.   Patient clinically stable  Cont to monitor     3. Plan follow up CT chest imaging as recommended per d/w pulmonology    F/b Pulmonology  Pt on supp 02 at night   Cont MDI and O2 as prescribed       4. Prescribed pred 40mg qd x 5 and augmentin 875mg/125 bid x 7 days  D/w pulmonology  Follow up with Pulm as planned     5. Cont supp O2, compliant with trelegy daily,   See above  Follow up with PULM as planned     Visit today included increased complexity associated with the care of the episodic problem bronchiolitis addressed and managing the longitudinal care of the patient due to the serious and/or complex managed problem(s) recurrent breast CA      Discussed plan with her treating Pulmonologist, Dr Zelalem HERRERA spent a total of 35 minutes on the day of the visit.This includes face to face time and non-face to face time preparing to see the patient (eg, review of tests), obtaining and/or reviewing  separately obtained history, documenting clinical information in the electronic or other health record, independently interpreting results and communicating results to the patient/family/caregiver, and coordinating care.

## 2025-03-10 ENCOUNTER — OFFICE VISIT (OUTPATIENT)
Dept: HEMATOLOGY/ONCOLOGY | Facility: CLINIC | Age: 80
End: 2025-03-10
Payer: MEDICARE

## 2025-03-10 VITALS
TEMPERATURE: 98 F | BODY MASS INDEX: 27.27 KG/M2 | SYSTOLIC BLOOD PRESSURE: 114 MMHG | HEART RATE: 49 BPM | WEIGHT: 153.88 LBS | HEIGHT: 63 IN | OXYGEN SATURATION: 96 % | DIASTOLIC BLOOD PRESSURE: 59 MMHG

## 2025-03-10 DIAGNOSIS — R91.1 PULMONARY NODULE: ICD-10-CM

## 2025-03-10 DIAGNOSIS — J21.9 BRONCHIOLITIS: ICD-10-CM

## 2025-03-10 DIAGNOSIS — C50.919 RECURRENT BREAST CANCER, UNSPECIFIED LATERALITY: Primary | ICD-10-CM

## 2025-03-10 DIAGNOSIS — Z85.118 HISTORY OF LUNG CANCER: ICD-10-CM

## 2025-03-10 DIAGNOSIS — J96.11 CHRONIC RESPIRATORY FAILURE WITH HYPOXIA: ICD-10-CM

## 2025-03-10 PROCEDURE — 99999 PR PBB SHADOW E&M-EST. PATIENT-LVL IV: CPT | Mod: PBBFAC,HCNC,, | Performed by: INTERNAL MEDICINE

## 2025-03-10 RX ORDER — AMOXICILLIN AND CLAVULANATE POTASSIUM 875; 125 MG/1; MG/1
1 TABLET, FILM COATED ORAL EVERY 12 HOURS
Qty: 14 TABLET | Refills: 0 | Status: SHIPPED | OUTPATIENT
Start: 2025-03-10 | End: 2025-03-17

## 2025-03-10 RX ORDER — PREDNISONE 20 MG/1
40 TABLET ORAL DAILY
Qty: 10 TABLET | Refills: 0 | Status: SHIPPED | OUTPATIENT
Start: 2025-03-10 | End: 2025-03-15

## 2025-03-12 ENCOUNTER — TELEPHONE (OUTPATIENT)
Dept: CARDIOLOGY | Facility: CLINIC | Age: 80
End: 2025-03-12

## 2025-03-12 ENCOUNTER — OFFICE VISIT (OUTPATIENT)
Dept: CARDIOLOGY | Facility: CLINIC | Age: 80
End: 2025-03-12
Payer: MEDICARE

## 2025-03-12 VITALS
WEIGHT: 153 LBS | HEIGHT: 63 IN | BODY MASS INDEX: 27.11 KG/M2 | HEART RATE: 45 BPM | DIASTOLIC BLOOD PRESSURE: 64 MMHG | SYSTOLIC BLOOD PRESSURE: 110 MMHG

## 2025-03-12 DIAGNOSIS — I70.0 ATHEROSCLEROSIS OF AORTA: Chronic | ICD-10-CM

## 2025-03-12 DIAGNOSIS — I50.32 CHRONIC HEART FAILURE WITH PRESERVED EJECTION FRACTION: Primary | ICD-10-CM

## 2025-03-12 DIAGNOSIS — R06.09 DOE (DYSPNEA ON EXERTION): ICD-10-CM

## 2025-03-12 DIAGNOSIS — I25.118 CORONARY ARTERY DISEASE OF NATIVE ARTERY OF NATIVE HEART WITH STABLE ANGINA PECTORIS: Chronic | ICD-10-CM

## 2025-03-12 LAB
OHS QRS DURATION: 86 MS
OHS QTC CALCULATION: 436 MS

## 2025-03-12 PROCEDURE — 99214 OFFICE O/P EST MOD 30 MIN: CPT | Mod: HCNC,S$GLB,, | Performed by: INTERNAL MEDICINE

## 2025-03-12 PROCEDURE — 1157F ADVNC CARE PLAN IN RCRD: CPT | Mod: HCNC,CPTII,S$GLB, | Performed by: INTERNAL MEDICINE

## 2025-03-12 PROCEDURE — 1159F MED LIST DOCD IN RCRD: CPT | Mod: HCNC,CPTII,S$GLB, | Performed by: INTERNAL MEDICINE

## 2025-03-12 PROCEDURE — 3078F DIAST BP <80 MM HG: CPT | Mod: HCNC,CPTII,S$GLB, | Performed by: INTERNAL MEDICINE

## 2025-03-12 PROCEDURE — 1101F PT FALLS ASSESS-DOCD LE1/YR: CPT | Mod: HCNC,CPTII,S$GLB, | Performed by: INTERNAL MEDICINE

## 2025-03-12 PROCEDURE — 1126F AMNT PAIN NOTED NONE PRSNT: CPT | Mod: HCNC,CPTII,S$GLB, | Performed by: INTERNAL MEDICINE

## 2025-03-12 PROCEDURE — 93000 ELECTROCARDIOGRAM COMPLETE: CPT | Mod: HCNC,S$GLB,, | Performed by: INTERNAL MEDICINE

## 2025-03-12 PROCEDURE — 3288F FALL RISK ASSESSMENT DOCD: CPT | Mod: HCNC,CPTII,S$GLB, | Performed by: INTERNAL MEDICINE

## 2025-03-12 PROCEDURE — 3074F SYST BP LT 130 MM HG: CPT | Mod: HCNC,CPTII,S$GLB, | Performed by: INTERNAL MEDICINE

## 2025-03-12 PROCEDURE — 99999 PR PBB SHADOW E&M-EST. PATIENT-LVL IV: CPT | Mod: PBBFAC,HCNC,, | Performed by: INTERNAL MEDICINE

## 2025-03-12 RX ORDER — CLOPIDOGREL BISULFATE 75 MG/1
75 TABLET ORAL DAILY
Qty: 90 TABLET | Refills: 3 | Status: SHIPPED | OUTPATIENT
Start: 2025-03-12 | End: 2026-03-12

## 2025-03-12 NOTE — PROGRESS NOTES
Subjective   Patient ID:  Ade Castellano is a 79 y.o. female who presents for follow-up of Stroke      HPI      CAD - KUN to RCA 2006, diastolic CHF, HTN, HLD, lung CA, breast CA     Previously followed by Dr Skelton  Patient with a past medical history of coronary artery disease.  Past medical history significant for lung cancer.  No significant coronary artery disease on coronary angiogram had luminal irregularities.     Heart failure with preserved ejection fraction     Squamous cell carcinoma of lung     Breast cancer     Aortic arch atherosclerosis     [facility] 8/13/20  Non-obstructive CAD.  No significant coronary artery stenosis. Luminal irregularities. Previously placed RCA stent is widely patent.     Stress test 7/24/20    The study shows abnormal myocardial perfusion.    Abnormal myocardial perfusion study.    Perfusion Defect There is a mild to moderate intensity, small to moderate sized, mostly reversible with some fixed areas defect in the anteroapical wall(s).    Gated perfusion images showed an ejection fraction of 71%    There is normal wall motion at rest and post stress.    The EKG portion of this study is negative for ischemia.    The patient reported no chest pain during the stress test.    There were no arrhythmias during stress.     Echo 5/22/24    No intracardiac source of embolus noted on this exam.  If high clinical suspicion, consider MORELIA +/- bubble study.    Left Ventricle: The left ventricle is normal in size. Normal wall thickness. There is normal systolic function with a visually estimated ejection fraction of 65 - 70%. Grade I diastolic dysfunction.    Right Ventricle: Normal right ventricular cavity size. Systolic function is normal.    Left Atrium: Left atrium is mildly dilated. Agitated saline study of the atrial septum is negative after vasalva maneuver, suggesting absence of intracardiac shunt at the atrial level.    Aortic Valve: The aortic valve is a trileaflet valve. There is  moderate aortic valve sclerosis. Mildly restricted motion. There is mild to moderate stenosis. Aortic valve area by VTI is 1.22 cm². Aortic valve peak velocity is 2.96 m/s. Mean gradient is 22 mmHg. The dimensionless index is 0.42.    Pulmonary Artery: The estimated pulmonary artery systolic pressure is 33 mmHg.        Holter 7/24/20  The diary was not returned.  NSR. Heart rates varied between 47 and 89 bpm with an average of 58 bpm.  Rare PACs / PVCs     EKG 4/25/23 Sinus bradycardia otherwise ok     6/21/23 Denies CP, Stable LOGAN  Stopped metoprolol recently for hypotension  BP controlled  Echo with EF 55% and moderate MR  Continue Rx for CAD, CHF, HTN, HLD  OV 3 months with BNP, BMP     9/21/23 labs done this morning. Denies CP, LOGAN improved  EKG sinus bradycardia 51 otherwise ok  BP controlled  Continue Rx for CAD, CHF, HTN, HLD  F/u labs if stable then OV 6 months     Admitted 5/21/24  Ade LOPEZ Gray with a pmh of CAD, COPD, history of MI (2006-stents), history of Bell's palsy with left facial droop (chronic) and cancer of cervix, lung, breast, and lymph nodes was placed under observation for further medical management of her dizziness and possible TIA workup. Patient with blurry vision, slurred speech, blurry vision, and feeling her body pulling to the right for the past few days. Patients CTA head was unremarkable with no evidence of proximal significant stenosis or large vessel occlusion. CTA neck showed atherosclerotic plaquing of the carotid bifurcations and proximal ICAs with less than 50% proximal ICA stenosis. CT head showed no evidence of acute intracranial hemorrhage or definite abnormal parenchymal enhancement. Patient was placed under observation for MRI brain and neurology evaluation. Patients labs were fairly unremarkable except lipid profile showing elevated triglycerides 178. Echocardiogram with bubble study showed normal systolic function with EF 65-70% with grade 1 dd. MRI brain without  contrast showed no acute intracranial abnormality. Neurology evaluation concluded that patients symptoms concerning for vascular etiology and mentions that at time posterior circulation infarcts can take up to 5 days to appear on MRI and therefore will treat accordingly. Patients etiology appears to be secondary to small vessel disease. Recommendations were to continue Aspirin 81 mg daily, plavix 75 mg for 21 days followed by ASA 81 mg monotherapy thereafter. Patient also will be started on high intensity statin atorvastatin 40 mg daily. Neurology discussed goal parameters for TIA/stroke with patient: LDL <70 mg/dl, HbA1C <7 %, and SBP <130/80 along with diet and exercise with lifestyle modifications. PT/OT evaluated patient and recommended low intensity therapy. Patients condition discussed at length with patient and explained precautions I.e to move slowly and avoid quick movements when getting up to prevent syncopal episodes. Patient understood and had no further questions.      7/15/24 Denies CP, mild stable LOGAN, denies further TIA symptoms  BP controlled  Change lipitor back to crestor at patient request  Continue Rx for CAD, CHF, HTN, HLD  OV 6 months     3/12/25 Last note from PCP staes she was found to have a rentinal stroke on recent eye exam. ASA was increased to 325 qd  Denies CP or SOB. Occasional dizziness  EKG sinus bradycardia 44  Still on metoprolol 25 bid  BP controlled       Review of Systems   Constitutional: Negative for decreased appetite.   HENT:  Negative for ear discharge.    Eyes:  Negative for blurred vision.   Respiratory:  Negative for hemoptysis.    Endocrine: Negative for polyphagia.   Hematologic/Lymphatic: Negative for adenopathy.   Skin:  Negative for color change.   Musculoskeletal:  Negative for joint swelling.   Genitourinary:  Negative for bladder incontinence.   Neurological:  Negative for brief paralysis.   Psychiatric/Behavioral:  Negative for hallucinations.     Allergic/Immunologic: Negative for hives.          Objective     Physical Exam  Constitutional:       Appearance: She is well-developed.   HENT:      Head: Normocephalic and atraumatic.   Eyes:      Conjunctiva/sclera: Conjunctivae normal.      Pupils: Pupils are equal, round, and reactive to light.   Cardiovascular:      Rate and Rhythm: Normal rate.      Pulses: Intact distal pulses.      Heart sounds: Normal heart sounds.   Pulmonary:      Effort: Pulmonary effort is normal.      Breath sounds: Normal breath sounds.   Abdominal:      General: Bowel sounds are normal.      Palpations: Abdomen is soft.   Musculoskeletal:         General: Normal range of motion.      Cervical back: Normal range of motion and neck supple.   Skin:     General: Skin is warm and dry.   Neurological:      Mental Status: She is alert and oriented to person, place, and time.            Assessment and Plan     1. Chronic heart failure with preserved ejection fraction    2. LOGAN (dyspnea on exertion)    3. Atherosclerosis of aorta    4. Coronary artery disease of native artery of native heart with stable angina pectoris        Plan:    Stop metoprolol with sinus bradycardia   Decrease ASA 81 qd add plavix 75 qd for recent retinal stoke seen on eye exam   Continue Rx for CAD, CHF, HTN, HLD  OV 3 months    Advance Care Planning     Date: 03/12/2025  Patient did not wish or was not able to name a surrogate decision maker or provide an Advance Care Plan.

## 2025-03-12 NOTE — TELEPHONE ENCOUNTER
Patient wants to know what dose of the aspirin she is to be taking now. She states you discussed it with her today. ananya

## 2025-03-12 NOTE — TELEPHONE ENCOUNTER
----- Message from Génesis sent at 3/12/2025  3:48 PM CDT -----  Type:  Needs Medical AdviceWho Called: PtWould the patient rather a call back or a response via [facility]? CallBest Call Back Number:   [phone] Information: Pt would like to speak with provider's nurse regarding prescription and dosage

## 2025-03-21 ENCOUNTER — TELEPHONE (OUTPATIENT)
Dept: CARDIOLOGY | Facility: CLINIC | Age: 80
End: 2025-03-21
Payer: MEDICARE

## 2025-03-21 NOTE — TELEPHONE ENCOUNTER
Spoke with patient and she states it didn't start until she started medication. She states she can't take this medication and she wants to start back on her metoprolol.

## 2025-03-21 NOTE — TELEPHONE ENCOUNTER
----- Message from Med Assistant Martinez sent at 3/21/2025  3:10 PM CDT -----  Regarding: FW: self  Please advise, patient states the blood thinner is making her sleepy and elevating her heart rate.  ----- Message -----  From: Luz Aly  Sent: 3/21/2025   2:10 PM CDT  To: Rahul Gutiérrez Staff  Subject: self                                             Type: Patient Call Back Who called:self What is the request in detail:pt is stating she has been taking the clopidogreL (PLAVIX) 75 mg tablet and she feels sleepy on it and her heart rate is going up to 80. Pt been on it about a week and she takes the medication in the morning. Please call to advise Can the clinic reply by [facility]? No Would the patient rather a call back or a response via [facility]? Call back Best call back number: [phone] Additional Information: Thank you.

## 2025-04-01 ENCOUNTER — HOSPITAL ENCOUNTER (EMERGENCY)
Facility: HOSPITAL | Age: 80
Discharge: HOME | End: 2025-04-01
Attending: STUDENT IN AN ORGANIZED HEALTH CARE EDUCATION/TRAINING PROGRAM
Payer: MEDICARE

## 2025-04-01 VITALS
HEART RATE: 68 BPM | TEMPERATURE: 98 F | WEIGHT: 155 LBS | SYSTOLIC BLOOD PRESSURE: 139 MMHG | HEIGHT: 63 IN | OXYGEN SATURATION: 98 % | DIASTOLIC BLOOD PRESSURE: 69 MMHG | RESPIRATION RATE: 20 BRPM | BODY MASS INDEX: 27.46 KG/M2

## 2025-04-01 DIAGNOSIS — I10 HYPERTENSION, UNSPECIFIED TYPE: Primary | ICD-10-CM

## 2025-04-01 DIAGNOSIS — F41.9 ANXIETY: ICD-10-CM

## 2025-04-01 LAB
ABSOLUTE EOSINOPHIL (OHS): 0.11 K/UL
ABSOLUTE MONOCYTE (OHS): 0.41 K/UL (ref 0.3–1)
ABSOLUTE NEUTROPHIL COUNT (OHS): 5.25 K/UL (ref 1.8–7.7)
ALBUMIN SERPL BCP-MCNC: 4.1 G/DL (ref 3.5–5.2)
ALP SERPL-CCNC: 63 UNIT/L (ref 40–150)
ALT SERPL W/O P-5'-P-CCNC: 16 UNIT/L (ref 10–44)
ANION GAP (OHS): 16 MMOL/L (ref 8–16)
AST SERPL-CCNC: 21 UNIT/L (ref 11–45)
BASOPHILS # BLD AUTO: 0.07 K/UL
BASOPHILS NFR BLD AUTO: 1 %
BILIRUB SERPL-MCNC: 0.5 MG/DL (ref 0.1–1)
BNP SERPL-MCNC: 55 PG/ML (ref 0–99)
BUN SERPL-MCNC: 11 MG/DL (ref 8–23)
CALCIUM SERPL-MCNC: 9.8 MG/DL (ref 8.7–10.5)
CHLORIDE SERPL-SCNC: 105 MMOL/L (ref 95–110)
CO2 SERPL-SCNC: 18 MMOL/L (ref 23–29)
CREAT SERPL-MCNC: 0.8 MG/DL (ref 0.5–1.4)
ERYTHROCYTE [DISTWIDTH] IN BLOOD BY AUTOMATED COUNT: 14.1 % (ref 11.5–14.5)
GFR SERPLBLD CREATININE-BSD FMLA CKD-EPI: >60 ML/MIN/1.73/M2
GLUCOSE SERPL-MCNC: 106 MG/DL (ref 70–110)
HCT VFR BLD AUTO: 43.2 % (ref 37–48.5)
HGB BLD-MCNC: 14.1 GM/DL (ref 12–16)
IMM GRANULOCYTES # BLD AUTO: 0.03 K/UL (ref 0–0.04)
IMM GRANULOCYTES NFR BLD AUTO: 0.4 % (ref 0–0.5)
LYMPHOCYTES # BLD AUTO: 0.92 K/UL (ref 1–4.8)
MCH RBC QN AUTO: 29.6 PG (ref 27–31)
MCHC RBC AUTO-ENTMCNC: 32.6 G/DL (ref 32–36)
MCV RBC AUTO: 91 FL (ref 82–98)
NUCLEATED RBC (/100WBC) (OHS): 0 /100 WBC
PLATELET # BLD AUTO: 212 K/UL (ref 150–450)
PMV BLD AUTO: 9.8 FL (ref 9.2–12.9)
POTASSIUM SERPL-SCNC: 4.1 MMOL/L (ref 3.5–5.1)
PROT SERPL-MCNC: 7.7 GM/DL (ref 6–8.4)
RBC # BLD AUTO: 4.76 M/UL (ref 4–5.4)
RELATIVE EOSINOPHIL (OHS): 1.6 %
RELATIVE LYMPHOCYTE (OHS): 13.5 % (ref 18–48)
RELATIVE MONOCYTE (OHS): 6 % (ref 4–15)
RELATIVE NEUTROPHIL (OHS): 77.5 % (ref 38–73)
SODIUM SERPL-SCNC: 139 MMOL/L (ref 136–145)
TROPONIN I SERPL DL<=0.01 NG/ML-MCNC: <0.006 NG/ML
WBC # BLD AUTO: 6.79 K/UL (ref 3.9–12.7)

## 2025-04-01 PROCEDURE — 85025 COMPLETE CBC W/AUTO DIFF WBC: CPT | Mod: HCNC

## 2025-04-01 PROCEDURE — 84484 ASSAY OF TROPONIN QUANT: CPT | Mod: HCNC

## 2025-04-01 PROCEDURE — 93010 ELECTROCARDIOGRAM REPORT: CPT | Mod: HCNC,,, | Performed by: INTERNAL MEDICINE

## 2025-04-01 PROCEDURE — 83880 ASSAY OF NATRIURETIC PEPTIDE: CPT | Mod: HCNC

## 2025-04-01 PROCEDURE — 93005 ELECTROCARDIOGRAM TRACING: CPT | Mod: HCNC

## 2025-04-01 PROCEDURE — 99284 EMERGENCY DEPT VISIT MOD MDM: CPT | Mod: 25,HCNC

## 2025-04-01 PROCEDURE — 82040 ASSAY OF SERUM ALBUMIN: CPT | Mod: HCNC

## 2025-04-01 NOTE — DISCHARGE INSTRUCTIONS

## 2025-04-01 NOTE — ED PROVIDER NOTES
Encounter Date: 4/1/2025       History     Chief Complaint   Patient presents with    Tremors     Pt to ED via [facility] for eval of tremors to upper extremities since 1230 today. Pt states she has had intermittent tremors, stutter and sweating since starting plavix 2 weeks ago. Hx strokes and MI.      Patient is a 79-year-old with a history of COPD, CHF, CAD, HTN who presents with multiple complaints.  Patient reports 2 weeks ago she went to her cardiologist where she was taken off of the metoprolol she had been on for almost 20 years.  She also was started on Plavix due to a recent optic nerve TIA.  Since the medications switched, she reports her heart rate has been in the 80s which was concerning to her as her heart rate used to be in the 50s.  She also endorses more frequent bruising.  She reports her blood pressure was in the 180s today which is what made her call EMS.  She says at the time she felt shaky and like she was sweating at the time.  She says this has intermittently been happening over the last week.  She is highly concerned that this is a side effect of the Plavix.  Patient also shares that she has been experiencing significant anxiety surrounding an upcoming trip to visit a family member in [place].  All of the symptoms that brought her into the hospital today have resolved.    The history is provided by the patient, a caregiver and a relative. No  was used.     Review of patient's allergies indicates:  No Known Allergies  Past Medical History:   Diagnosis Date    Abnormal stress test 8/5/2020    Acute respiratory failure with hypoxia and hypercapnia 11/29/2017    Angina pectoris     Arthritis     Bell's palsy     left facial weakness    Breast cancer     RIGHT    CAD (coronary artery disease)     Cervical cancer     Chronic bronchitis     Chronic heart failure with preserved ejection fraction 8/5/2020    Chronic heart failure with preserved ejection fraction 8/5/2020    COPD  (chronic obstructive pulmonary disease)     Dr. Katz    Dental bridge present     Emphysema of lung     H/O colonoscopy 06/2017    due for repeat colonsocopy in 6/2018    History of Bell's palsy     History of heart artery stent     Dr. Ortiz  x2 stents    Hyperlipidemia     Hypertension     Lung cancer     Myocardial infarction     SUNITHA (obstructive sleep apnea)     intolerant to mask    SUNITHA (obstructive sleep apnea)     intolerant to mask     Pneumonia     Pneumonia due to other staphylococcus     PUD (peptic ulcer disease)     Severe anemia 6/11/2018    Sleep apnea     Vaginal delivery     x1     Past Surgical History:   Procedure Laterality Date    ADENOIDECTOMY      BREAST BIOPSY Right     x3    BREAST LUMPECTOMY      BREAST SURGERY      lumpectomy right side     CERVIX SURGERY      cone    COLONOSCOPY N/A 3/17/2017    Procedure: COLONOSCOPY;  Surgeon: Julio Rudd MD;  Location: [facility] ENDO;  Service: Endoscopy;  Laterality: N/A;    COLONOSCOPY N/A 6/30/2017    Procedure: COLONOSCOPY;  Surgeon: Julio Rudd MD;  Location: [facility] ENDO;  Service: Endoscopy;  Laterality: N/A;    COLONOSCOPY N/A 11/28/2018    Procedure: COLONOSCOPY;  Surgeon: Nura Urbina MD;  Location: [facility] ENDO;  Service: Endoscopy;  Laterality: N/A;    COLONOSCOPY N/A 1/29/2019    Procedure: COLONOSCOPY;  Surgeon: Emmanuel Perez MD;  Location: [facility];  Service: Endoscopy;  Laterality: N/A;  confirmed appt-SP    COLONOSCOPY N/A 7/26/2022    Procedure: COLONOSCOPY;  Surgeon: James Healy MD;  Location: [facility];  Service: Endoscopy;  Laterality: N/A;  fully vaccinated -  mediport in chest -     CORONARY ANGIOPLASTY WITH STENT PLACEMENT      ESOPHAGEAL MANOMETRY WITH MEASUREMENT OF IMPEDANCE N/A 7/10/2023    Procedure: MANOMETRY, ESOPHAGUS, WITH IMPEDANCE MEASUREMENT;  Surgeon: Miller Phillips MD;  Location: [facility] (4TH FLR);  Service: Gastroenterology;  Laterality: N/A;  pt on oxygen 2.5L  pt requested Tuesday only  5/31  referred by Dr. Miller Phillips/instr. mailed-st  7/3-r/s, updated instructions reviewed with pt in detail via phone, pt verbalized understanding-KPvt    ESOPHAGOGASTRODUODENOSCOPY N/A 1/25/2021    Procedure: EGD (ESOPHAGOGASTRODUODENOSCOPY);  Surgeon: Dmitry Gonzalez MD;  Location: [facility];  Service: Endoscopy;  Laterality: N/A;  rapid test >50 miles -ml    ESOPHAGOGASTRODUODENOSCOPY N/A 11/8/2022    Procedure: EGD (ESOPHAGOGASTRODUODENOSCOPY);  Surgeon: Tapan Stevenson MD;  Location: [facility];  Service: Endoscopy;  Laterality: N/A;  w/dilation  fully vaccinated, instructions mailed-[facility]  11/4 pt called did not receive instructions, does not have portal or email, went over prep instructions and medication instructions with pt on the phone -LW    ESOPHAGOGASTRODUODENOSCOPY N/A 9/19/2023    Procedure: EGD (ESOPHAGOGASTRODUODENOSCOPY);  Surgeon: Miller Phillips MD;  Location: [facility];  Service: Endoscopy;  Laterality: N/A;  botox injection  inst mailed    EYE SURGERY Bilateral 06/08/2018    cataract     LEFT HEART CATHETERIZATION Left 8/13/2020    Procedure: Left heart cath, rra, noon;  Surgeon: Guevara Skelton MD;  Location: [facility] CATH LAB;  Service: Cardiology;  Laterality: Left;  RN PREOP 8/7/2020---COVID NEGATIVE ON 8/12    PORTACATH PLACEMENT Right 01/2018    sweat glands axillary regions Bilateral     TONSILLECTOMY       Family History   Problem Relation Name Age of Onset    Cancer Mother          breast    Heart disease Mother      Breast cancer Mother      Cancer Father          lung-smoker     Cancer Sister          lung-smoker     Heart attack Sister      Cancer Maternal Grandmother      Heart disease Maternal Grandmother      Cancer Maternal Grandfather      Heart disease Maternal Grandfather      Cancer Paternal Grandmother      Cancer Paternal Grandfather      Cancer Sister          mets not sure where it started     COPD Sister      Heart disease Sister      Kidney cancer Son      Colon cancer Neg Hx       Esophageal cancer Neg Hx       Social History[1]  Review of Systems   Constitutional:  Negative for fever.   HENT:  Negative for sore throat.    Respiratory:  Negative for shortness of breath.    Cardiovascular:  Negative for chest pain.   Gastrointestinal:  Negative for nausea.   Genitourinary:  Negative for dysuria.   Musculoskeletal:  Negative for back pain.   Skin:  Negative for rash.   Neurological:  Negative for weakness.   Hematological:  Does not bruise/bleed easily.       Physical Exam     Initial Vitals [04/01/25 1529]   BP Pulse Resp Temp SpO2   (!) 127/91 103 18 97.8 °F (36.6 °C) 97 %      MAP       --         Physical Exam    Vitals reviewed.  Constitutional: No distress.   HENT:   Head: Normocephalic and atraumatic.   Eyes: EOM are normal.   Neck:   Normal range of motion.  Cardiovascular:  Normal rate, regular rhythm and intact distal pulses.           Pulmonary/Chest: Breath sounds normal. No respiratory distress.   Abdominal: Abdomen is soft. There is no abdominal tenderness.   Musculoskeletal:         General: No edema. Normal range of motion.      Cervical back: Normal range of motion.     Neurological: She is alert and oriented to person, place, and time.   No focal neurologic deficits   Skin: Skin is warm.         ED Course   Procedures  Labs Reviewed   COMPREHENSIVE METABOLIC PANEL - Abnormal       Result Value    Sodium 139      Potassium 4.1      Chloride 105      CO2 18 (*)     Glucose 106      BUN 11      Creatinine 0.8      Calcium 9.8      Protein Total 7.7      Albumin 4.1      Bilirubin Total 0.5      ALP 63      AST 21      ALT 16      Anion Gap 16      eGFR >60     CBC WITH DIFFERENTIAL - Abnormal    WBC 6.79      RBC 4.76      HGB 14.1      HCT 43.2      MCV 91      MCH 29.6      MCHC 32.6      RDW 14.1      Platelet Count 212      MPV 9.8      Nucleated RBC 0      Neut % 77.5 (*)     Lymph % 13.5 (*)     Mono % 6.0      Eos % 1.6      Basophil % 1.0      Imm Grans % 0.4       Neut # 5.25      Lymph # 0.92 (*)     Mono # 0.41      Eos # 0.11      Baso # 0.07      Imm Grans # 0.03     B-TYPE NATRIURETIC PEPTIDE - Normal    BNP 55     TROPONIN I - Normal    Troponin-I <0.006     CBC W/ AUTO DIFFERENTIAL    Narrative:     The following orders were created for panel order CBC auto differential.  Procedure                               Abnormality         Status                     ---------                               -----------         ------                     CBC with Differential[[id number]]       Abnormal            Final result                 Please view results for these tests on the individual orders.     EKG Readings: (Independently Interpreted)   Normal sinus rhythm, rate of 83, normal axis, normal intervals       Imaging Results    None          Medications - No data to display  Medical Decision Making  Ade Castellano is a 79 y.o. female with h/o COPD, CHF, CAD, HTN who presents to the ED with concerns about recent medication adjustments and an episode of hypertension, shakiness and sweating that has resolved.    Initial vitals reassuring. Physical exam reveals a well-appearing elderly woman with a benign exam.    Patient had a CBC, CMP, troponin and BNP that were all reassuring.  Her EKG was normal.    Given patient's reassuring exam, resolution of symptoms upon arrival to the emergency room, normal labs and significant anxiety regarding her medication changes and a possible trip to visit family, her presentation appears most consistent with anxiety.  Considered but doubt hypertensive emergency, seizure, stroke, electrolyte abnormality, SWETA, thyroid abnormalities, UTI, sepsis, ACS, PE.      Had extensive conversation with the patient and her niece at the bedside regarding her recent medication changes.  Counseled patient extensively on what a normal heart rate is and why her vitals are normal.  Patient voiced understanding.  Counseled patient on asymptomatic hypertension.   Patient will follow up outpatient with her PCP, cardiologist.  She has agreed to keep taking Plavix however I did inform her that her cardiologist sent her a message that said if she is still concerned about side effects she can stop it.  Patient voiced understanding.  All questions answered.  Return precautions given.  Patient will be discharged      DISCLAIMER: This note was prepared with [employer] voice recognition transcription software. Garbled syntax, mangled pronouns, and other bizarre constructions may be attributed to that software system.      Amount and/or Complexity of Data Reviewed  Independent Historian: caregiver  Labs: ordered.  ECG/medicine tests: ordered and independent interpretation performed.                                      Clinical Impression:  Final diagnoses:  [I10] Hypertension, unspecified type (Primary)  [F41.9] Anxiety          ED Disposition Condition    Discharge Stable          ED Prescriptions    None       Follow-up Information       Follow up With Specialties Details Why Contact Info    Jeannine Huitron MD Family Medicine Schedule an appointment as soon as possible for a visit   [address] [facility]  [place] [address]  [phone]                   [1]   Social History  Tobacco Use    Smoking status: Former     Current packs/day: 0.00     Average packs/day: 0.3 packs/day for 50.0 years (12.5 ttl pk-yrs)     Types: Cigarettes     Start date: 1967     Quit date: 2017     Years since quittin.3     Passive exposure: Past    Smokeless tobacco: Former    Tobacco comments:     7 years since smoked    Substance Use Topics    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

## 2025-04-02 ENCOUNTER — PATIENT OUTREACH (OUTPATIENT)
Facility: OTHER | Age: 80
End: 2025-04-02
Payer: MEDICARE

## 2025-04-02 NOTE — PROGRESS NOTES
April Calles  ED Navigator  Emergency Department    Project: [facility] ED Navigator  Role: Community Health Worker    Date: 2025  Patient Name: Ade Castellano  MRN: [id number]  PCP: Jeannine Huitron MD    Assessment:     Ade Castellano is a 79 y.o. female who has presented to ED for HTN. Patient has visited the ED 1 times in the past 3 months. Patient did not contact PCP.     ED Navigator Initial Assessment    ED Navigator Enrollment Documentation  Consent to Services  Does patient consent to completing the assessment?: Yes  Contact  Method of Initial Contact: Phone  Transportation  Insurance Coverage  Do you have coverage/adequate coverage?: Yes  Specialist Appointment  Did the patient come to the ED to see a specialist?: No  Does the patient have a pending specialist referral?: No  Does the patient have a specialist appointment made?: No  PCP Follow Up Appointment  Medications  Is patient able to afford medication?: Yes  Psychological  Food  Communication/Education  Other Financial Concerns  Other Social Barriers/Concerns  Primary Barrier         Social History     Socioeconomic History    Marital status: Single   Tobacco Use    Smoking status: Former     Current packs/day: 0.00     Average packs/day: 0.3 packs/day for 50.0 years (12.5 ttl pk-yrs)     Types: Cigarettes     Start date: 1967     Quit date: 2017     Years since quittin.4     Passive exposure: Past    Smokeless tobacco: Former    Tobacco comments:     7 years since smoked    Substance and Sexual Activity    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     Drug use: No    Sexual activity: Not Currently     Social Drivers of Health     Financial Resource Strain: Low Risk  (3/6/2025)    Overall Financial Resource Strain (CARDIA)     Difficulty of Paying Living Expenses: Not hard at all   Food Insecurity: No Food Insecurity (3/6/2025)    Hunger Vital Sign     Worried About Running Out of Food in the Last Year: Never true     Ran Out of Food  in the Last Year: Never true   Transportation Needs: No Transportation Needs (3/6/2025)    PRAPARE - Transportation     Lack of Transportation (Medical): No     Lack of Transportation (Non-Medical): No   Physical Activity: Inactive (3/6/2025)    Exercise Vital Sign     Days of Exercise per Week: 0 days     Minutes of Exercise per Session: 0 min   Stress: No Stress Concern Present (3/6/2025)    [employer] - Occupational Stress Questionnaire     Feeling of Stress : Only a little   Housing Stability: Low Risk  (3/6/2025)    Housing Stability Vital Sign     Unable to Pay for Housing in the Last Year: No     Homeless in the Last Year: No       Plan:   Patient was contacted for post ED discharge navigation. They have an appointment scheduled with Dr. Jeannine Huitron on 4/14/25 at 11:00. ED navigator will remind patient of appointment.      Appointment made with: Jeannine Huitron MD

## 2025-04-02 NOTE — ED TRIAGE NOTES
Patient was brought in for evaluation of tremors that she states started after her provider stopped her metoprolol and put her in clopidogrel approximately 2 weeks ago. Denies any chest pain, shortness of breath, nausea, vomiting or diarrhea.

## 2025-04-03 LAB
OHS QRS DURATION: 78 MS
OHS QTC CALCULATION: 458 MS

## 2025-04-14 ENCOUNTER — OFFICE VISIT (OUTPATIENT)
Dept: PULMONOLOGY | Facility: CLINIC | Age: 80
End: 2025-04-14
Payer: MEDICARE

## 2025-04-14 ENCOUNTER — OFFICE VISIT (OUTPATIENT)
Dept: FAMILY MEDICINE | Facility: CLINIC | Age: 80
End: 2025-04-14
Payer: MEDICARE

## 2025-04-14 VITALS
HEIGHT: 63 IN | WEIGHT: 153 LBS | OXYGEN SATURATION: 94 % | DIASTOLIC BLOOD PRESSURE: 70 MMHG | TEMPERATURE: 98 F | HEART RATE: 86 BPM | BODY MASS INDEX: 27.11 KG/M2 | SYSTOLIC BLOOD PRESSURE: 114 MMHG

## 2025-04-14 VITALS
BODY MASS INDEX: 27.11 KG/M2 | OXYGEN SATURATION: 95 % | HEART RATE: 79 BPM | WEIGHT: 153 LBS | DIASTOLIC BLOOD PRESSURE: 82 MMHG | HEIGHT: 63 IN | SYSTOLIC BLOOD PRESSURE: 132 MMHG

## 2025-04-14 DIAGNOSIS — G47.33 OSA AND COPD OVERLAP SYNDROME: ICD-10-CM

## 2025-04-14 DIAGNOSIS — I10 PRIMARY HYPERTENSION: ICD-10-CM

## 2025-04-14 DIAGNOSIS — J96.11 CHRONIC RESPIRATORY FAILURE WITH HYPOXIA: ICD-10-CM

## 2025-04-14 DIAGNOSIS — F41.9 ANXIETY: ICD-10-CM

## 2025-04-14 DIAGNOSIS — J21.9 BRONCHIOLITIS: Primary | ICD-10-CM

## 2025-04-14 DIAGNOSIS — J44.9 OSA AND COPD OVERLAP SYNDROME: ICD-10-CM

## 2025-04-14 DIAGNOSIS — J44.9 CHRONIC OBSTRUCTIVE PULMONARY DISEASE, UNSPECIFIED COPD TYPE: ICD-10-CM

## 2025-04-14 DIAGNOSIS — R10.13 EPIGASTRIC ABDOMINAL PAIN: Primary | ICD-10-CM

## 2025-04-14 PROCEDURE — 3074F SYST BP LT 130 MM HG: CPT | Mod: HCNC,CPTII,S$GLB, | Performed by: FAMILY MEDICINE

## 2025-04-14 PROCEDURE — 3078F DIAST BP <80 MM HG: CPT | Mod: HCNC,CPTII,S$GLB, | Performed by: FAMILY MEDICINE

## 2025-04-14 PROCEDURE — 1101F PT FALLS ASSESS-DOCD LE1/YR: CPT | Mod: HCNC,CPTII,S$GLB, | Performed by: INTERNAL MEDICINE

## 2025-04-14 PROCEDURE — 3288F FALL RISK ASSESSMENT DOCD: CPT | Mod: HCNC,CPTII,S$GLB, | Performed by: INTERNAL MEDICINE

## 2025-04-14 PROCEDURE — 3288F FALL RISK ASSESSMENT DOCD: CPT | Mod: HCNC,CPTII,S$GLB, | Performed by: FAMILY MEDICINE

## 2025-04-14 PROCEDURE — 99215 OFFICE O/P EST HI 40 MIN: CPT | Mod: HCNC,S$GLB,, | Performed by: INTERNAL MEDICINE

## 2025-04-14 PROCEDURE — 1126F AMNT PAIN NOTED NONE PRSNT: CPT | Mod: HCNC,CPTII,S$GLB, | Performed by: FAMILY MEDICINE

## 2025-04-14 PROCEDURE — 3075F SYST BP GE 130 - 139MM HG: CPT | Mod: HCNC,CPTII,S$GLB, | Performed by: INTERNAL MEDICINE

## 2025-04-14 PROCEDURE — 99214 OFFICE O/P EST MOD 30 MIN: CPT | Mod: HCNC,S$GLB,, | Performed by: FAMILY MEDICINE

## 2025-04-14 PROCEDURE — 1159F MED LIST DOCD IN RCRD: CPT | Mod: HCNC,CPTII,S$GLB, | Performed by: FAMILY MEDICINE

## 2025-04-14 PROCEDURE — 1126F AMNT PAIN NOTED NONE PRSNT: CPT | Mod: HCNC,CPTII,S$GLB, | Performed by: INTERNAL MEDICINE

## 2025-04-14 PROCEDURE — 1157F ADVNC CARE PLAN IN RCRD: CPT | Mod: HCNC,CPTII,S$GLB, | Performed by: INTERNAL MEDICINE

## 2025-04-14 PROCEDURE — 99999 PR PBB SHADOW E&M-EST. PATIENT-LVL V: CPT | Mod: PBBFAC,HCNC,, | Performed by: INTERNAL MEDICINE

## 2025-04-14 PROCEDURE — 99999 PR PBB SHADOW E&M-EST. PATIENT-LVL V: CPT | Mod: PBBFAC,,, | Performed by: FAMILY MEDICINE

## 2025-04-14 PROCEDURE — 1157F ADVNC CARE PLAN IN RCRD: CPT | Mod: HCNC,CPTII,S$GLB, | Performed by: FAMILY MEDICINE

## 2025-04-14 PROCEDURE — 1101F PT FALLS ASSESS-DOCD LE1/YR: CPT | Mod: HCNC,CPTII,S$GLB, | Performed by: FAMILY MEDICINE

## 2025-04-14 PROCEDURE — 3079F DIAST BP 80-89 MM HG: CPT | Mod: HCNC,CPTII,S$GLB, | Performed by: INTERNAL MEDICINE

## 2025-04-14 RX ORDER — ESCITALOPRAM OXALATE 10 MG/1
10 TABLET ORAL DAILY
Qty: 90 TABLET | Refills: 3 | Status: SHIPPED | OUTPATIENT
Start: 2025-04-14 | End: 2026-04-14

## 2025-04-14 NOTE — PROGRESS NOTES
"    General Pulmonary Clinic  Follow Up Patient Visit      Chief Complaint: COPD     HPI     Ade Castellano is a 79 y.o. female with a history of  COPD, breast/lung cancer, small vessel, CAD, SUNITHA, chronic diastolic HF (grade 1), mild-moderate AS, achalasia presenting with chief complaint of chronic hypoxemic respiratory failure 2/2 copd    Interval hx  CT chest 3/6/25 personally reviewed  Stable RML band like scarring/atelectasis,  Interval development of bronchiolitis and consolidaton (primary peripheral lateral RLL and LLL) bu also involving lingula, RML   Discussed w/ Dr. Lowe she was rx prednisone 40 mg daily x 5 days and augmentin 875 mg bid x 7 days    She continues to have intermittent episodes of regurgitation   She is using trelegy daily, still using albuterol on average   She is not wearing oxygen on exertion      Presenting HPI  # COPD    Uable to quantify exercise tolerance -- mmRC 3, completes all ADLs  Has chronic productive cough w/ clear/white phlegm  # of exacerbations per year 0, requiring hospitalization - 2017 had post obstructive PNA requiring intubation  Current regimen: trelegy, ventolin 1-2 puff per day, rx oxygen 2.5 L/min with exertion  She has daytime sleepiness, snoring -- had sleep study that was consistent w/ USNITHA but was unable to tolerate mask fitting  Denies GERD but does have hx of achalasia with reg  wing is at the level of her neck/back of mouth  Participated in pulmonary rehab yes [] no[x]  Historical eosinophilia yes [] no[x]   Known triggers yes [] no[x]   Childhood asthma yes [] no[x]  Previously seen by Dr. Katz at [facility] [place] -- pfts " severe obstructive FEV1 30-49% predicted) w gas trapping and moderately reduced DLCO 40-59%  Former smoker 2 ppd x 60 years, 2017  Exposures: worked in construction, security [employer]      Records reviewed with the following findings ---  Hematology-oncology noted 11/20/24 personally reviewed  - hx of stage 1A R breast cancer s/p " lumpectomy/XRT 2014  - stage IV SCC R lung 2/14/208 s/p carboplatin/abraxane 2018 x 6 cycles  - Recurrent breast cancer R pbreast w/ R axillary LN mets - placed on AC x 4 cycles, XRT to axilla -> pembrolizumab [id number]  - 9/2024 PET CT w/ no signs of active disease    CT chest 5/2024 personally interpreted no suspicious pulmonary nodules  Objective   Past History     Past Medical History:  Past Medical History:   Diagnosis Date    Abnormal stress test 8/5/2020    Acute respiratory failure with hypoxia and hypercapnia 11/29/2017    Angina pectoris     Arthritis     Bell's palsy     left facial weakness    Breast cancer     RIGHT    CAD (coronary artery disease)     Cervical cancer     Chronic bronchitis     Chronic heart failure with preserved ejection fraction 8/5/2020    Chronic heart failure with preserved ejection fraction 8/5/2020    COPD (chronic obstructive pulmonary disease)     Dr. Katz    Dental bridge present     Emphysema of lung     H/O colonoscopy 06/2017    due for repeat colonsocopy in 6/2018    History of Bell's palsy     History of heart artery stent     Dr. Ortiz  x2 stents    Hyperlipidemia     Hypertension     Lung cancer     Myocardial infarction     SUNITHA (obstructive sleep apnea)     intolerant to mask    SUNITHA (obstructive sleep apnea)     intolerant to mask     Pneumonia     Pneumonia due to other staphylococcus     PUD (peptic ulcer disease)     Severe anemia 6/11/2018    Sleep apnea     Vaginal delivery     x1         Past Surgical History:  Past Surgical History:   Procedure Laterality Date    ADENOIDECTOMY      BREAST BIOPSY Right     x3    BREAST LUMPECTOMY      BREAST SURGERY      lumpectomy right side     CERVIX SURGERY      cone    COLONOSCOPY N/A 3/17/2017    Procedure: COLONOSCOPY;  Surgeon: Julio Rudd MD;  Location: [facility] ENDO;  Service: Endoscopy;  Laterality: N/A;    COLONOSCOPY N/A 6/30/2017    Procedure: COLONOSCOPY;  Surgeon: Julio Rudd MD;  Location: [facility] ENDO;  Service:  Endoscopy;  Laterality: N/A;    COLONOSCOPY N/A 11/28/2018    Procedure: COLONOSCOPY;  Surgeon: Nura Urbina MD;  Location: [facility] ENDO;  Service: Endoscopy;  Laterality: N/A;    COLONOSCOPY N/A 1/29/2019    Procedure: COLONOSCOPY;  Surgeon: Emmanuel Perez MD;  Location: [facility];  Service: Endoscopy;  Laterality: N/A;  confirmed appt-SP    COLONOSCOPY N/A 7/26/2022    Procedure: COLONOSCOPY;  Surgeon: James Healy MD;  Location: [facility];  Service: Endoscopy;  Laterality: N/A;  fully vaccinated -  mediport in chest -     CORONARY ANGIOPLASTY WITH STENT PLACEMENT      ESOPHAGEAL MANOMETRY WITH MEASUREMENT OF IMPEDANCE N/A 7/10/2023    Procedure: MANOMETRY, ESOPHAGUS, WITH IMPEDANCE MEASUREMENT;  Surgeon: Miller Phillips MD;  [facility]  Service: Gastroenterology;  Laterality: N/A;  pt on oxygen 2.5L  pt requested Tuesday only  5/31 referred by Dr. Miller Phillips/instr. mailed-st  7/3-r/s, updated instructions reviewed with pt in detail via phone, pt verbalized understanding-KPvt    ESOPHAGOGASTRODUODENOSCOPY N/A 1/25/2021    Procedure: EGD (ESOPHAGOGASTRODUODENOSCOPY);  Surgeon: Dmitry Gonzalez MD;  Location: [facility];  Service: Endoscopy;  Laterality: N/A;  rapid test >50 miles -ml    ESOPHAGOGASTRODUODENOSCOPY N/A 11/8/2022    Procedure: EGD (ESOPHAGOGASTRODUODENOSCOPY);  Surgeon: Tapan Stevenson MD;  Location: [facility];  Service: Endoscopy;  Laterality: N/A;  w/dilation  fully vaccinated, instructions mailed-Kpvt  11/4 pt called did not receive instructions, does not have portal or email, went over prep instructions and medication instructions with pt on the phone -LW    ESOPHAGOGASTRODUODENOSCOPY N/A 9/19/2023    Procedure: EGD (ESOPHAGOGASTRODUODENOSCOPY);  Surgeon: Miller Phillips MD;  Location: [facility];  Service: Endoscopy;  Laterality: N/A;  botox injection  inst mailed    EYE SURGERY Bilateral 06/08/2018    cataract     LEFT HEART CATHETERIZATION Left 8/13/2020     Procedure: Left heart cath, rra, noon;  Surgeon: Guevara Skelton MD;  Location: [facility] CATH LAB;  Service: Cardiology;  Laterality: Left;  RN PREOP 2020---COVID NEGATIVE ON     PORTACATH PLACEMENT Right 2018    sweat glands axillary regions Bilateral     TONSILLECTOMY           Social History:   Social History     Socioeconomic History    Marital status: Single   Tobacco Use    Smoking status: Former     Current packs/day: 0.00     Average packs/day: 0.3 packs/day for 50.0 years (12.5 ttl pk-yrs)     Types: Cigarettes     Start date: 1967     Quit date: 2017     Years since quittin.4     Passive exposure: Past    Smokeless tobacco: Former    Tobacco comments:     7 years since smoked    Substance and Sexual Activity    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     Drug use: No    Sexual activity: Not Currently     Social Drivers of Health     Financial Resource Strain: Low Risk  (3/6/2025)    Overall Financial Resource Strain (CARDIA)     Difficulty of Paying Living Expenses: Not hard at all   Food Insecurity: No Food Insecurity (3/6/2025)    Hunger Vital Sign     Worried About Running Out of Food in the Last Year: Never true     Ran Out of Food in the Last Year: Never true   Transportation Needs: No Transportation Needs (3/6/2025)    PRAPARE - Transportation     Lack of Transportation (Medical): No     Lack of Transportation (Non-Medical): No   Physical Activity: Inactive (3/6/2025)    Exercise Vital Sign     Days of Exercise per Week: 0 days     Minutes of Exercise per Session: 0 min   Stress: No Stress Concern Present (3/6/2025)    [employer] [place] [employer] - Occupational Stress Questionnaire     Feeling of Stress : Only a little   Housing Stability: Low Risk  (3/6/2025)    Housing Stability Vital Sign     Unable to Pay for Housing in the Last Year: No     Homeless in the Last Year: No         Family Hx:  No family history of pulmonary fibrosis, pre-mature graying of  hair, cirrhosis, or hematologic malignancies  No family history of emphysema or spontaneous PTX  Father - passed lung cancer in his 70s    Allergies and Pertinent Medications   Allergies and Medications Reviewed    Patient has no known allergies.  Current Outpatient Medications on File Prior to Visit   Medication Sig Dispense Refill    acetaminophen (TYLENOL) 650 MG TbSR Take 650 mg by mouth every 8 (eight) hours.      albuterol (PROVENTIL) 2.5 mg /3 mL (0.083 %) nebulizer solution NEBULIZE 1 vial EVERY 6 HOURS AS NEEDED FOR WHEEZING 540 mL 1    albuterol (VENTOLIN HFA) 90 mcg/actuation inhaler Inhale 2 puffs into the lungs every 6 (six) hours as needed for Wheezing. Rescue      aspirin (ECOTRIN) 81 MG EC tablet Take 81 mg by mouth once daily.  (Patient taking differently: Take 325 mg by mouth once daily.)      azelastine (ASTELIN) 137 mcg (0.1 %) nasal spray       clopidogreL (PLAVIX) 75 mg tablet Take 1 tablet (75 mg total) by mouth once daily. 90 tablet 3    diclofenac (VOLTAREN) 50 MG EC tablet TAKE 1 TABLET BY MOUTH TWICE DAILY AS NEEDED 45 tablet 2    fluticasone propionate (FLONASE) 50 mcg/actuation nasal spray SHAKE WELL & USE TWO SPRAYS EACH NOSTRIL ONCE A DAY 48 g 3    fluticasone-umeclidin-vilanter (TRELEGY ELLIPTA) 200-62.5-25 mcg inhaler Inhale 1 puff into the lungs once daily. 60 each 11    isosorbide mononitrate (IMDUR) 30 MG 24 hr tablet TAKE 1 TABLET BY MOUTH EVERY DAY. Hold if SBP <120 90 tablet 3    levocetirizine (XYZAL) 5 MG tablet TAKE 1 TABLET BY MOUTH EVERY DAY IN THE EVENING FOR ALLERGIES 90 tablet 1    methocarbamoL (ROBAXIN) 500 MG Tab Take 1 tablet (500 mg total) by mouth 2 (two) times daily as needed (back spasm). 30 tablet 0    montelukast (SINGULAIR) 10 mg tablet 1 tablet Orally Once a day for 30 days      nitroGLYCERIN (NITROSTAT) 0.4 MG SL tablet PLACE 1 TAB UNDER TONGUE EVERY 5 MINUTES x 3 DOSES AS NEEDED FOR CHEST PAIN. IF NOT RESOLVED CALL 911 25 tablet 11    pantoprazole  "(PROTONIX) 40 MG tablet Take 1 tablet (40 mg total) by mouth once daily. 90 tablet 3    pregabalin (LYRICA) 75 MG capsule Take 75 mg by mouth 2 (two) times daily.      rosuvastatin (CRESTOR) 20 MG tablet Take 1 tablet (20 mg total) by mouth once daily. 90 tablet 3     No current facility-administered medications on file prior to visit.              Physical Exam   /82   Pulse 79   Ht 5' 3" (1.6 m)   Wt 69.4 kg (153 lb)   LMP  (LMP Unknown)   SpO2 95%   BMI 27.10 kg/m²       Constitutional: No acute distress, Atraumatic   HEENT: moist mucus membranes, extraocular movements intact  Cardiovascular: regular rate and rhythm, no murmurs, rubs or gallops  Pulmonary: normal respiratory rate and chest rise, no chest wall deformity, normal breath sounds with no wheezing or crackles  Abdominal: non-distended, bowel sounds present  Musculoskeletal: No lower extremity edema, no clubbing  Neurological: normal speech/abhay, moves all extremities against gravity  Skin: no finger cyanosis, no rashes on exposed body parts  Psych: Appropriate affect, normal mood       Diagnostic Studies      All diagnostic studies relevant to chief complaint reviewed personally    Labs:  Lab Results   Component Value Date    WBC 6.79 04/01/2025    HGB 14.1 04/01/2025    HGB 14.3 03/06/2025     04/01/2025     03/06/2025    MCV 91 04/01/2025    MCV 91 03/06/2025     Lab Results   Component Value Date     04/01/2025     03/06/2025    K 4.1 04/01/2025    K 3.9 03/06/2025    CO2 18 (L) 04/01/2025    CO2 24 03/06/2025    BUN 11 04/01/2025    CREATININE 0.8 04/01/2025    MG 1.7 01/15/2025     Lab Results   Component Value Date    AST 21 04/01/2025    AST 17 03/06/2025    ALT 16 04/01/2025    ALT 16 03/06/2025    ALBUMIN 4.1 04/01/2025    ALBUMIN 3.5 03/06/2025    PROT 7.7 03/06/2025    BILITOT 0.5 04/01/2025    BILITOT 0.7 03/06/2025         PFTs:  Pulmonary Functions Testing Results:  No results found for: "FEV1", " ""FVC", "OEL9GZH", "TLC", "DLCO"      FVC FEV1 FEV/FVC TLC DLCO 6MWT Interpret                                                         TTE:  Results for orders placed during the hospital encounter of 05/21/24    Echo Saline Bubble? Yes    Interpretation Summary    No intracardiac source of embolus noted on this exam.  If high clinical suspicion, consider MORELIA +/- bubble study.    Left Ventricle: The left ventricle is normal in size. Normal wall thickness. There is normal systolic function with a visually estimated ejection fraction of 65 - 70%. Grade I diastolic dysfunction.    Right Ventricle: Normal right ventricular cavity size. Systolic function is normal.    Left Atrium: Left atrium is mildly dilated. Agitated saline study of the atrial septum is negative after vasalva maneuver, suggesting absence of intracardiac shunt at the atrial level.    Aortic Valve: The aortic valve is a trileaflet valve. There is moderate aortic valve sclerosis. Mildly restricted motion. There is mild to moderate stenosis. Aortic valve area by VTI is 1.22 cm². Aortic valve peak velocity is 2.96 m/s. Mean gradient is 22 mmHg. The dimensionless index is 0.42.    Pulmonary Artery: The estimated pulmonary artery systolic pressure is 33 mmHg.            Assessment and Plan   Ade Castellano is a 79 y.o. female  a history of  COPD, breast/lung cancer, small vessel, CAD, SUNITHA, chronic diastolic HF (grade 1), mild-moderate AS, achalasia presenting with chief complaint of chronic hypoxemic respiratory failure 2/2 copd    Problem List:  # multilobar bronchiolitis and consolidation -acute, improving- s/p augmentin, concerned for aspiration (see below), repeat ct chest in June 2025  # chronic hypoxemic respiratory failure req 3-6 L/min O2 2/2 COPD Group B (mmRC 3) - chronic, stable but is still very symptomatic despite trelegy use. Explained poor control may be related to untreated SUNITHA, possible aspiration  - continue trelegy to 200 mcg dose " daily  - continue O2  3-6 L/min with exertion (declined new O2 rx to keep portable concentrator)  -. Declined pulmonary rehab, see below for suspected aspiration.   # aspiration w/ hx of achalasia - chronic, stable - having regurgitation episodes. Will refer back to GI to assess for dilation given concern for aspiration  # SUNITHA - chronic, untreated - amenable to sleep study will order  # hx of lung cancer - in remission - follows w/  w/ Dr. Holliday   # anxiety -- chronic, stable --referral to psychiatry    Differential, diagnoses, diagnostic work up, and possible treatments were discussed with the patient. Questions were answered.    Jaqueline Masterson MD  Pulmonary-Critical Care Medicine              For this visit, the following time was spent  preparing to see the patient (e.g., review of tests) 5 minutes  obtaining and/or reviewing separately obtained history 0 minutes  Performing a medically necessary appropriate examination and/or evaluation 15 minutes  Counseling and educating the patient/family/caregiver 15 minutes  Ordering medications, tests, or procedures 1 minutes  Referring and communicating with other health care professionals (when not reported separately) 2minutes  Documenting clinical information in the electronic or other health record 58minutes  Care coordination (not reported separately) 0minutes    Total time = 41 minutes

## 2025-04-14 NOTE — Clinical Note
Pt is here and we talked about her CPAP  machine. She is interested in trying this with the nasal cushion. She is okay with you giving the referral

## 2025-04-14 NOTE — PROGRESS NOTES
Subjective     Patient ID: Ade Castellano is a 79 y.o. female.    Chief Complaint: ER follow up     79 year old female presents for follow up. She is here for ER follow up. She was on metoprolol and it was discontinued due to bradycardia. She admits that she has been nervous. 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 Her blood pressure and heart rate went up. She was sweaty and nervous. She was treated for anxiety.   She was seen by pulmonary and  they want her to use the CPAP. She states she refused this. She was also told to be on oxygen all day, but she declines. She states she feels her oxygen is normal all day around 95.     She has some epigastric abdominal pain. She states her stomach feels hard. Her bowels haven't; been moving. She takes dulcolax and she goes, but it helps some, but not completely. She states sometimes the pain is associated with meals and she feels swollen. She is not eating much.    EGD  [facility]  Patient Name: Ade Castellano  Procedure Date: 9/19/2023 2:48 PM  MRN: [id number]  Account Number: [id number]  YOB: 1945  Admit Type: Outpatient  Age: 77  Room: Room 3  Gender: Female  Attending MD: Miller Phillips MD, [id number]  Procedure:             Upper GI endoscopy  Indications:           Epigastric abdominal pain, Dysphagia, For                         botulinum toxin injection of achalasia  Providers:             Miller Phillips MD, Sofía Solares RN,                         Emmanuel Aldridge, Technician, Christa Chavira CRNA  Referring MD:          Miller Phillips MD  Complications:         No immediate complications.  Medicines:             Monitored Anesthesia Care  Procedure:             Pre-Anesthesia Assessment:                         - Prior to the procedure, a History and Physical                         was performed, and patient medications and                         allergies were reviewed. The patient's tolerance                          of previous anesthesia was also reviewed. The                         risks and benefits of the procedure and the                         sedation options and risks were discussed with the                         patient. All questions were answered, and informed                         consent was obtained. Prior Anticoagulants: The                         patient has taken no anticoagulant or antiplatelet                         agents. ASA Grade Assessment: IV - A patient with                         severe systemic disease that is a constant threat                         to life. After reviewing the risks and benefits,                         the patient was deemed in satisfactory condition                         to undergo the procedure.                         After obtaining informed consent, the endoscope                         was passed under direct vision. Throughout the                         procedure, the patient's blood pressure, pulse,                         and oxygen saturations were monitored                         continuously. The Olympus scope GIF-                         ([id number]) was introduced through the mouth, and                         advanced to the second part of duodenum. The upper                         GI endoscopy was accomplished without difficulty.                         The patient tolerated the procedure well.  Findings:       A hypertonic lower esophageal sphincter was found. This was       traversed with gentle pressure. Area was successfully injected with       100 units botulinum toxin.       Localized moderate mucosal changes characterized by nodularity and       altered texture were found in the gastric antrum. Biopsies were       taken with a cold forceps for histology.       The examined duodenum was normal.  Impression:            - Hypertonic lower esophageal sphincter. Injected                         with botulinum toxin.                          - Nodular and texture changed mucosa in the                         antrum. Biopsied.                         - Normal examined duodenum.  Recommendation:        - Discharge patient to home.                         - Resume previous diet.                         - Continue present medications.                         - Advance diet as tolerated.                         - Return to GI clinic as previously scheduled.  Miller Phillips MD  9/19/2023 3:21:23 PM       History of Present Illness               Past Medical History:   Diagnosis Date    Abnormal stress test 8/5/2020    Acute respiratory failure with hypoxia and hypercapnia 11/29/2017    Angina pectoris     Arthritis     Bell's palsy     left facial weakness    Breast cancer     RIGHT    CAD (coronary artery disease)     Cervical cancer     Chronic bronchitis     Chronic heart failure with preserved ejection fraction 8/5/2020    Chronic heart failure with preserved ejection fraction 8/5/2020    COPD (chronic obstructive pulmonary disease)     Dr. Katz    Dental bridge present     Emphysema of lung     H/O colonoscopy 06/2017    due for repeat colonsocopy in 6/2018    History of Bell's palsy     History of heart artery stent     Dr. Ortiz  x2 stents    Hyperlipidemia     Hypertension     Lung cancer     Myocardial infarction     SUNITHA (obstructive sleep apnea)     intolerant to mask    SUNITHA (obstructive sleep apnea)     intolerant to mask     Pneumonia     Pneumonia due to other staphylococcus     PUD (peptic ulcer disease)     Severe anemia 6/11/2018    Sleep apnea     Vaginal delivery     x1      Past Surgical History:   Procedure Laterality Date    ADENOIDECTOMY      BREAST BIOPSY Right     x3    BREAST LUMPECTOMY      BREAST SURGERY      lumpectomy right side     CERVIX SURGERY      cone    COLONOSCOPY N/A 3/17/2017    Procedure: COLONOSCOPY;  Surgeon: Julio Rudd MD;  Location: [facility];  Service: Endoscopy;  Laterality: N/A;    COLONOSCOPY N/A  6/30/2017    Procedure: COLONOSCOPY;  Surgeon: Julio Rudd MD;  Location: [facility] ENDO;  Service: Endoscopy;  Laterality: N/A;    COLONOSCOPY N/A 11/28/2018    Procedure: COLONOSCOPY;  Surgeon: Nura Urbina MD;  Location: [facility] ENDO;  Service: Endoscopy;  Laterality: N/A;    COLONOSCOPY N/A 1/29/2019    Procedure: COLONOSCOPY;  Surgeon: Emmanuel Perez MD;  Location: [facility];  Service: Endoscopy;  Laterality: N/A;  confirmed appt-SP    COLONOSCOPY N/A 7/26/2022    Procedure: COLONOSCOPY;  Surgeon: James Healy MD;  Location: [facility];  Service: Endoscopy;  Laterality: N/A;  fully vaccinated -  mediport in chest -     CORONARY ANGIOPLASTY WITH STENT PLACEMENT      ESOPHAGEAL MANOMETRY WITH MEASUREMENT OF IMPEDANCE N/A 7/10/2023    Procedure: MANOMETRY, ESOPHAGUS, WITH IMPEDANCE MEASUREMENT;  Surgeon: Miller Phillips MD;  Location: [facility] ([address] [place] [address]);  Service: Gastroenterology;  Laterality: N/A;  pt on oxygen 2.5L  pt requested Tuesday only  5/31 referred by Dr. Miller Phillips/instr. mailed-st  7/3-r/s, updated instructions reviewed with pt in detail via phone, pt verbalized understanding-[facility]    ESOPHAGOGASTRODUODENOSCOPY N/A 1/25/2021    Procedure: EGD (ESOPHAGOGASTRODUODENOSCOPY);  Surgeon: Dmitry Gonzalez MD;  Location: [facility];  Service: Endoscopy;  Laterality: N/A;  rapid test >50 miles -ml    ESOPHAGOGASTRODUODENOSCOPY N/A 11/8/2022    Procedure: EGD (ESOPHAGOGASTRODUODENOSCOPY);  Surgeon: Tapan Stevenson MD;  Location: [facility];  Service: Endoscopy;  Laterality: N/A;  w/dilation  fully vaccinated, instructions mailed-[facility]  11/4 pt called did not receive instructions, does not have portal or email, went over prep instructions and medication instructions with pt on the phone -LW    ESOPHAGOGASTRODUODENOSCOPY N/A 9/19/2023    Procedure: EGD (ESOPHAGOGASTRODUODENOSCOPY);  Surgeon: Miller Phillips MD;  Location: [facility];  Service: Endoscopy;  Laterality: N/A;  botox injection  inst  "mailed    EYE SURGERY Bilateral 06/08/2018    cataract     LEFT HEART CATHETERIZATION Left 8/13/2020    Procedure: Left heart cath, rra, noon;  Surgeon: Guevara Skelton MD;  Location: [facility] CATH LAB;  Service: Cardiology;  Laterality: Left;  RN PREOP 8/7/2020---COVID NEGATIVE ON 8/12    PORTACATH PLACEMENT Right 01/2018    sweat glands axillary regions Bilateral     TONSILLECTOMY       Family History   Problem Relation Name Age of Onset    Cancer Mother          breast    Heart disease Mother      Breast cancer Mother      Cancer Father          lung-smoker     Cancer Sister          lung-smoker     Heart attack Sister      Cancer Maternal Grandmother      Heart disease Maternal Grandmother      Cancer Maternal Grandfather      Heart disease Maternal Grandfather      Cancer Paternal Grandmother      Cancer Paternal Grandfather      Cancer Sister          mets not sure where it started     COPD Sister      Heart disease Sister      Kidney cancer Son      Colon cancer Neg Hx      Esophageal cancer Neg Hx       Social History[1]     Review of Systems         Objective     Vitals:    04/14/25 1024   BP: 114/70   Pulse: 86   Temp: 97.6 °F (36.4 °C)   TempSrc: Oral   SpO2: (!) 94%   Weight: 69.4 kg (153 lb)   Height: 5' 3" (1.6 m)        Physical Exam  Constitutional:       General: She is not in acute distress.     Appearance: She is well-developed. She is not diaphoretic.   HENT:      Head: Normocephalic and atraumatic.   Eyes:      Conjunctiva/sclera: Conjunctivae normal.   Cardiovascular:      Rate and Rhythm: Normal rate and regular rhythm.      Heart sounds: Normal heart sounds. No murmur heard.     No friction rub. No gallop.   Pulmonary:      Effort: Pulmonary effort is normal. No respiratory distress.      Breath sounds: Normal breath sounds. No stridor. No wheezing, rhonchi or rales.   Abdominal:      General: Abdomen is flat. Bowel sounds are normal. There is no distension.      Palpations: Abdomen is soft. " There is no mass.      Tenderness: There is no abdominal tenderness. There is no guarding or rebound.      Hernia: No hernia is present.   Musculoskeletal:      Cervical back: Normal range of motion and neck supple.   Neurological:      General: No focal deficit present.      Mental Status: She is alert and oriented to person, place, and time.   Psychiatric:         Mood and Affect: Mood normal.         Behavior: Behavior normal.       Physical Exam                Assessment and Plan     1. Epigastric abdominal pain  -     US Abdomen Complete; Future; Expected date: 2025  -     Ambulatory referral/consult to Gastroenterology; Future; Expected date: 2025  U/S to check her pancreas. Referral to her GI for EGD to reassess her esophagus for stricture.     Ade was seen today for er follow up .    Diagnoses and all orders for this visit:    Epigastric abdominal pain  -     US Abdomen Complete; Future  -     Ambulatory referral/consult to Gastroenterology; Future        Assessment & Plan                      No follow-ups on file.        This note was generated with the assistance of ambient listening technology. Verbal consent was obtained by the patient and accompanying visitor(s) for the recording of patient appointment to facilitate this note. I attest to having reviewed and edited the generated note for accuracy, though some syntax or spelling errors may persist. Please contact the author of this note for any clarification.         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     Drug use: No    Sexual activity: Not Currently     Social Drivers of Health     Financial Resource Strain: Low Risk  (3/6/2025)    Overall Financial Resource Strain (CARDIA)     Difficulty of Paying Living Expenses: Not hard at all   Food Insecurity: No Food Insecurity (3/6/2025)    Hunger Vital Sign     Worried About Running Out of Food in the Last Year: Never true     Ran Out of Food in the Last Year: Never true   Transportation Needs: No Transportation Needs (3/6/2025)    PRAPARE - Transportation     Lack of Transportation (Medical): No     Lack of Transportation (Non-Medical): No   Physical Activity: Inactive (3/6/2025)    Exercise Vital Sign     Days of Exercise per Week: 0 days     Minutes of Exercise per Session: 0 min   Stress: No Stress Concern Present (3/6/2025)    [employer] - Occupational Stress Questionnaire     Feeling of Stress : Only a little   Housing Stability: Low Risk  (3/6/2025)    Housing Stability Vital Sign     Unable to Pay for Housing in the Last Year: No     Homeless in the Last Year: No

## 2025-04-16 ENCOUNTER — TELEPHONE (OUTPATIENT)
Dept: ENDOSCOPY | Facility: HOSPITAL | Age: 80
End: 2025-04-16
Payer: MEDICARE

## 2025-04-16 VITALS — HEIGHT: 63 IN | WEIGHT: 153 LBS | BODY MASS INDEX: 27.11 KG/M2

## 2025-04-16 DIAGNOSIS — K22.0 ACHALASIA: Primary | ICD-10-CM

## 2025-04-16 NOTE — TELEPHONE ENCOUNTER
Referral for procedure from [facility]      Spoke to Ade to schedule procedure(s) Upper Endoscopy (EGD)       Physician to perform procedure(s) Dr. FORD Cardoza  Date of Procedure (s) 5/20/25  Arrival Time 9:30 AM  Time of Procedure(s) 10:30 AM   Location of Procedure(s) [address]   Type of Rx Prep sent to patient: Other  Instructions provided to patient via [facility]    Patient was informed on the following information and verbalized understanding. Screening questionnaire reviewed with patient and complete. If procedure requires anesthesia, a responsible adult needs to be present to accompany the patient home, patient cannot drive after receiving anesthesia. Appointment details are tentative, especially check-in time. Patient will receive a prep-op call 7 days prior to confirm check-in time for procedure. If applicable the patient should contact their pharmacy to verify Rx for procedure prep is ready for pick-up. Patient was advised to call the scheduling department at [phone] if pharmacy states no Rx is available. Patient was advised to call the endoscopy scheduling department if any questions or concerns arise.       Endoscopy Scheduling Department   Pt is currently taking Plavix (clopidogrel). Message sent to Endoscopy clearance nurse per protocol to submit Plavix (clopidogrel) hold.

## 2025-04-16 NOTE — TELEPHONE ENCOUNTER
"----- Message from Sary sent at 4/16/2025  8:21 AM CDT -----  Regarding: FW: EGD    ----- Message -----  From: Bria Posadas MD  Sent: 4/15/2025  12:28 PM CDT  To: [facility]  Subject: EGD                                              Procedure: EGDDiagnosis: achalasiaProcedure Timing: Within 4 weeks (Urgent)#If within 4 weeks selected, please suman as high priority##If greater than 12 weeks, please select "5-12 weeks" and delay sending until 3 months prior to requested date# Location:  with meAdditional Scheduling Information: Blood thinnersPrep Specifications:Standard prepIs the patient taking a GLP-1 Agonist:noHave you attached a patient to this message: yes  "

## 2025-04-16 NOTE — TELEPHONE ENCOUNTER
IMPORTANT INFORMATION TO KNOW BEFORE YOUR PROCEDURE    [facility] 2nd Floor       When you arrive:  If your procedure is Monday - Friday 5am - 7pm - Please enter through the front door near [facility]. Please proceed up the first set of elevators to the 2nd floor where you will check in at the endo registration desk.     If your procedure is on a Saturday (weekend), enter through the back Outpatient entrance. Please note this entrance is diagonal from the Emergency Department entrance.              If your procedure requires the administration of anesthesia, it is necessary for a responsible adult to drive you home. (Medical Transportation, Uber, Lyft, Taxi, etc. may ONLY be used if a responsible adult is present to accompany you home.  The responsible adult CAN'T be the  of the service).      person must be available to return to pick you up within 15 minutes of being notified of discharge.       Please bring a picture ID, insurance card, & copayment      Take Medications as directed below:    If you are taking any injectable medication (s) for weight loss and/or diabetes  , hold , please stop taking }on . After the procedure, your provider will inform you  of when to resume injection.    If you are taking the oral medication Adipex (Phentermine), hold 4 days prior to your procedure, please stop taking on .       If you are taking the oral medication(s):   Invokana (Canagliflozin), Farxiga (Dapagliflozin), Jardiance (Empagliflozin), Invokamet/Invokamet XR (invokana +metformin), Xigduo (farxiga + metformin), Synjardy XR (jardiance + metformin), hold 3 days prior to your procedure, please stop taking on .     1) Stop taking Plavix (clopidogrel) 5 days (prior to the procedure) on:  5/15/25     2) Stop taking   (prior to the procedure) on:      If you begin taking any blood thinning medications, injectable weight loss/diabetes medications (other than insulin) , or Adipex  (Phentermine) please contact the endoscopy scheduling department listed below as soon as possible.    If you are diabetic see the attached instruction sheet regarding your medication.     If you take HEART, BLOOD PRESSURE, SEIZURE, PAIN, LUNG (including inhalers/nebulizers), ANTI-REJECTION (transplant patients), or PSYCHIATRIC medications, please take at your regular times with a sip of water or as directed by the scheduling nurse.     Important contact information:    Endoscopy [phone] Hours of operation Monday-Friday 8:00-4:30pm.    Questions about insurance or financial obligations call [phone] or [phone].    If you have questions regarding the prep or need to reschedule, please call [phone]. After hours questions requiring immediate assistance, contact [facility] On-Call nurse line at [phone] or [phone].   NOTE:     On occasion, unforeseen circumstances may cause a delay in your procedure start time. We respect your time and appreciate your patience during these circumstances.      Comments:              EGD/Upper Endoscopy    Date of procedure: 5/20/25 Arrive at: 9:30 AM    Location of Department:    [facility] [address] [place] [address]  Take the Elevators to 2nd Floor Endoscopy Procedural Area    You will be on a special diet 3 days prior to your procedure.      Your Full liquid diet begins on:  5/16/25    NO SOLID FOOD.   Begin full liquid diet.   Continue to drink liquids from the time you wake up until bedtime to avoid dehydration.     What do I need to know about a full liquid diet?  You may have any liquid.  You may have any food that becomes a liquid at room temperature. The food is considered a liquid if it can be poured off a spoon at room temperature.  Drink one serving of citrus or vitamin C-enriched fruit juice daily.  For more information on a full liquid diet please continue to the end of the instructions.     Your Clear  Liquid diet begins on:  5/19/25    NO SOLID FOOD.   Begin clear liquid diet.  Continue drinking clear liquids from the time you wake up until bedtime to avoid dehydration.    Clear Liquids That Are OK to Drink:   Water  Sports drinks (Gatorade, Power-Aid)  Coffee or tea (no cream or nondairy creamer)  Clear juices without pulp (apple, white grape)  Gelatin desserts (no fruit or toppings)  Clear soda (sprite, coke, ginger ale)  Chicken broth (until 12 midnight the night before procedure)    What You CANNOT do:   Do not EAT, drink milk or anything colored red.  Do not drink alcohol.    Date of your procedure:  5/20/25    Take your instructed AM medications by 6:00 am with a small sip of water or as instructed by your .  You may have water/clear liquids for up to 4 hours prior to your procedure as instructed by your  until 6:30AM.    Do not EAT, drink milk or anything colored red.  Do not drink alcohol.  No gum chewing or candy morning of procedure      Full Liquid Diet   A full liquid diet may be used:  To help you transition from a clear liquid diet to a soft diet.  When your body is healing and can only tolerate foods that are easy to digest.  Before or after certain a procedure, test, or surgery (such as stomach or intestinal surgeries).  If you have trouble swallowing or chewing.  A full liquid diet includes fluids and foods that are liquid or will become liquid at room temperature. The full liquid diet gives you the proteins, fluids, salts, and minerals that you need for energy.  If you continue this diet for more than 72 hours, talk to your health care provider about how many calories you need to consume. If you continue the diet for more than 5 days, talk to your health care provider about taking a multivitamin or a nutritional supplement.    What foods can I eat?  Grains  Any grain food that can be pureed in soup (such as crackers, pasta, and rice). Hot cereal (such as farina or oatmeal) that  has been blended. Talk to your health care provider or dietitian about these foods.  Vegetables  Pulp-free tomato or vegetable juice. Vegetables pureed in soup.  Fruits  Fruit juice, including nectars and juices with pulp.  Meats and Other Protein Sources  Eggs in custard, eggnog mix, and eggs used in ice cream or pudding. Strained meats, like in baby food, may be allowed. Consult your health care provider.  Dairy  Milk and milk-based beverages, including milk shakes and instant breakfast mixes. Smooth yogurt. Pureed cottage cheese. Avoid these foods if they are not well tolerated.  Beverages  All beverages, including liquid nutritional supplements. Ask your health care provider if you can have carbonated beverages. They may not be well tolerated.  Condiments  Iodized salt, pepper, spices, and flavorings. Cocoa powder. Vinegar, ketchup, yellow mustard, smooth sauces (such as hollandaise, cheese sauce, or white sauce), and soy sauce.    Sweets and Desserts  Custard, smooth pudding. Flavored gelatin. Tapioca, junket. Plain ice cream, sherbet, fruit ices. Ice pops, frozen fudge pops, pudding pops, and other frozen bars with cream. Syrups, including chocolate syrup. Sugar, honey, jelly.  Fats and Oils  Margarine, butter, cream, sour cream, and oils.  Other  Broth and cream soups. Strained, broth-based soups.  The items listed above may not be a complete list of recommended foods or beverages. Contact your dietitian for more options.    What foods can I not eat?  Grains  All breads. Grains are not allowed unless they are pureed into soup.  Vegetables  Vegetables are not allowed unless they are juiced, or cooked and pureed into soup.  Fruits  Fruits are not allowed unless they are juiced.  Meats and Other Protein Sources  Any meat or fish. Cooked or raw eggs. Nut butters.  Dairy  Cheese.  Condiments  Stone ground mustards.  Fats and Oils  Fats that are coarse or chunky.  Sweets and Desserts  Ice cream or other frozen  desserts that have any solids in them or on top, such as nuts, chocolate chips, and pieces of cookies. Cakes. Cookies. Candy.  Others  Soups with chunks or pieces in them.  The items listed above may not be a complete list of foods and beverages to avoid. Contact your dietitian for more information.  Document Released: 12/18/2006 Document Revised: 5/25/2017 Document Reviewed: 10/23/2014  ExitCare® Patient Information ©2018 [employer].         Comments:

## 2025-04-17 ENCOUNTER — TELEPHONE (OUTPATIENT)
Dept: ENDOSCOPY | Facility: HOSPITAL | Age: 80
End: 2025-04-17
Payer: MEDICARE

## 2025-04-17 NOTE — TELEPHONE ENCOUNTER
Dear Dr Kay,    Patient has a scheduled procedure Upper Endoscopy (EGD) on 5/20/25 and is currently taking a blood thinner. In order to ensure patient safety, we would like to confirm that the patient can place their blood thinner medication on hold for the procedure. Can he/she discontinue Plavix (clopidogrel) for a minimum of 5 days prior to the procedure?     Thank you for your prompt reply.    [employer] Endoscopy Scheduling

## 2025-04-17 NOTE — TELEPHONE ENCOUNTER
----- Message from Med Assistant Jolly sent at 2025  8:39 AM CDT -----  Regardin/20 BT CL  The patient is currently under an internal cardiologist, irene Singh. Is patient okay to hold blood thinner Plavix (clopidogrel) for their upcoming scheduled Upper Endoscopy (EGD) on 25. Additional request(s) required:internal cardiologistTyronedically optimized by Cardiology

## 2025-04-21 ENCOUNTER — OFFICE VISIT (OUTPATIENT)
Dept: GASTROENTEROLOGY | Facility: CLINIC | Age: 80
End: 2025-04-21
Payer: MEDICARE

## 2025-04-21 ENCOUNTER — TELEPHONE (OUTPATIENT)
Dept: CARDIOLOGY | Facility: CLINIC | Age: 80
End: 2025-04-21
Payer: MEDICARE

## 2025-04-21 VITALS
DIASTOLIC BLOOD PRESSURE: 77 MMHG | SYSTOLIC BLOOD PRESSURE: 111 MMHG | HEIGHT: 63 IN | WEIGHT: 149.69 LBS | BODY MASS INDEX: 26.52 KG/M2 | HEART RATE: 78 BPM

## 2025-04-21 DIAGNOSIS — K22.0 ACHALASIA: ICD-10-CM

## 2025-04-21 DIAGNOSIS — R14.0 ABDOMINAL BLOATING: ICD-10-CM

## 2025-04-21 DIAGNOSIS — R10.13 EPIGASTRIC ABDOMINAL PAIN: Primary | ICD-10-CM

## 2025-04-21 PROCEDURE — 3078F DIAST BP <80 MM HG: CPT | Mod: HCNC,CPTII,S$GLB,

## 2025-04-21 PROCEDURE — 99214 OFFICE O/P EST MOD 30 MIN: CPT | Mod: HCNC,S$GLB,,

## 2025-04-21 PROCEDURE — 3288F FALL RISK ASSESSMENT DOCD: CPT | Mod: HCNC,CPTII,S$GLB,

## 2025-04-21 PROCEDURE — 3074F SYST BP LT 130 MM HG: CPT | Mod: HCNC,CPTII,S$GLB,

## 2025-04-21 PROCEDURE — 1125F AMNT PAIN NOTED PAIN PRSNT: CPT | Mod: HCNC,CPTII,S$GLB,

## 2025-04-21 PROCEDURE — 1159F MED LIST DOCD IN RCRD: CPT | Mod: HCNC,CPTII,S$GLB,

## 2025-04-21 PROCEDURE — 1157F ADVNC CARE PLAN IN RCRD: CPT | Mod: HCNC,CPTII,S$GLB,

## 2025-04-21 PROCEDURE — 99999 PR PBB SHADOW E&M-EST. PATIENT-LVL IV: CPT | Mod: PBBFAC,HCNC,,

## 2025-04-21 PROCEDURE — 1101F PT FALLS ASSESS-DOCD LE1/YR: CPT | Mod: HCNC,CPTII,S$GLB,

## 2025-04-21 RX ORDER — CLOPIDOGREL BISULFATE 75 MG/1
75 TABLET ORAL DAILY
Qty: 90 TABLET | Refills: 3 | Status: SHIPPED | OUTPATIENT
Start: 2025-04-21 | End: 2026-04-21

## 2025-04-21 NOTE — TELEPHONE ENCOUNTER
Dr Gudelia HERRERA spoke with the patient this morning and she informed me that she is still taking Plavix.  She said that when she was discharged from the Emergency room the doctor told her to keep taking it.  (See ED provider note on 4/1/25).  A discussion may need to be had from you/your staff regarding the medication.  Please advise after.  Thank you.    Hodan

## 2025-04-21 NOTE — PATIENT INSTRUCTIONS
Constipation Tips  - Eat more high fiber foods   - Take a Fiber supplement (Metamucil, Benefiber, Citrucell, etc)  - Take Miralax (once, twice, or three times a day)  - Drink at least 8 cups of water a day  - Get regular physical activity  - Use a stool or Squatty Potty  - Avoid sitting on the toilet >5 minutes to prevent hemorrhoids

## 2025-04-21 NOTE — PROGRESS NOTES
"    [facility] Follow-UP Note    Reason for Follow-Up:  The primary encounter diagnosis was Epigastric abdominal pain. Diagnoses of Achalasia and Abdominal bloating were also pertinent to this visit.    PCP:   Jeannine Huitron   [address] [place] [id number]      HPI:  This is a 79 y.o. female with a history of COPD, CAD, breast cancer '15, lung cancer s/p chemo/radiation last seen in GI clinic on 5/1/2024 for follow-up evaluation of Type 3 achalasia, GIM, and epigastric abdominal pain. She had LES botox injections in September 2023. Per chart review, she had significant improvement afterwards. The plan was to follow up every 6 months and perform EGDs with botox injection as needed.   Pt was advised to continue Protonix and try Mylanta for her pain. Lipase, H/H, and CT were unrevealing.     Interval History:  Today, pt presents for follow up. Endorses recurrent dysphagia to solids (mostly rice) with regurgitation. She only has issues with liquids if she drinks too fast. Today, pt reports botox injection provided relief for "a few months", but symptoms eventually came back. Reports constant, sharp epigastric pain with abdominal distension, similar to clinic presentation last year. No known triggers. She is already scheduled for EGD in 1 month. No vomiting, hematemesis, odynophagia, inability to tolerate PO, diarrhea, bloody stools, black stools, or significant weight loss.     Pt reports she was recently switched from metoprolol to clopidogrel at last cardio visit, but has been feeling sick with high HR. Endorses reduced appetite with nausea and constipation because of this sick feeling which she attributes to Plavix. She took laxative 2 days ago with relief. Pt visited cardio office this morning and let them know about her symptoms, states she was told to discontinue Plavix and is awaiting phone call from her cardiologist.     Objective Findings:    Vital Signs:  /77   " "Pulse 78   Ht 5' 3" (1.6 m)   Wt 67.9 kg (149 lb 11.1 oz)   LMP  (LMP Unknown)   BMI 26.52 kg/m²   Body mass index is 26.52 kg/m².    Physical Exam:  General Appearance: Well appearing in no acute distress  Abdomen: Mild epigastric tenderness. Soft, non distended in all four quadrants. No hepatosplenomegaly, ascites, or mass    Assessment:  1. Epigastric abdominal pain    2. Achalasia    3. Abdominal bloating      This is a 79 y.o F with achalasia here for recurrent dysphagia and regurgitation. Last EGD in 2023 with LES Botox injection. Also with epigastric pain, distension and new constipation. Has EGD scheduled with plan for botox injection. Pt to hold her Plavix per cardio office instructions from this morning.     - Continue with planned EGD  - Continue Protonix 40mg once daily  - Try Mylanta for epigastric relief  - Advised increase water and fiber intake  - Miralax PRN for constipation    Thank you so much for allowing me to participate in the care of Margie A Treadway Sarah Abukhader, PA-C [facility]  "

## 2025-04-21 NOTE — TELEPHONE ENCOUNTER
----- Message from Mayra sent at 4/21/2025  3:06 PM CDT -----  Contact: patient  Type:  Patient CallWho Called: patient Does the patient know what this is regarding?: Requesting a call back about her medication she said that the nurse would know what she's talking about ; please advise Would the patient rather a call back or a response via [facility]? Call Best Call Back Number: [phone] Additional Information:

## 2025-04-21 NOTE — TELEPHONE ENCOUNTER
Spoke with patient. She states plavix is making her feel sick and making her heart rate go up to 118. Advised her per last message okay to stop plavix but that she was told to stop metoprolol. She states that she was on metoprolol for 20 years and it never gave her any problems. She wants to know what to do.

## 2025-04-22 ENCOUNTER — TELEPHONE (OUTPATIENT)
Dept: CARDIOLOGY | Facility: CLINIC | Age: 80
End: 2025-04-22
Payer: MEDICARE

## 2025-04-22 NOTE — TELEPHONE ENCOUNTER
Patient wants to know if she can restart her metoprolol and aspirin since she has stopped the plavix. Also wants to know if she has to stop lexapro if she restarts metoprolol.

## 2025-04-22 NOTE — TELEPHONE ENCOUNTER
----- Message from Carmen sent at 4/22/2025  3:40 PM CDT -----  Regarding: FW: self    ----- Message -----  From: Luz Aly  Sent: 4/22/2025   3:20 PM CDT  To: Rahul Gutiérrez Staff  Subject: self                                             Type: Patient Call Back Who called:self What is the request in detail:pt is stating she would like a call from Ms Martinez, she was supposed to hear back from her on a previous issue and calling to check the status Can the clinic reply by [facility]? No Would the patient rather a call back or a response via [facility]? Call back Best call back number: [phone] Additional Information: Thank you.

## 2025-04-24 ENCOUNTER — TELEPHONE (OUTPATIENT)
Dept: PULMONOLOGY | Facility: CLINIC | Age: 80
End: 2025-04-24
Payer: MEDICARE

## 2025-04-29 ENCOUNTER — HOSPITAL ENCOUNTER (OUTPATIENT)
Dept: RADIOLOGY | Facility: HOSPITAL | Age: 80
Discharge: HOME | End: 2025-04-29
Attending: FAMILY MEDICINE
Payer: MEDICARE

## 2025-04-29 DIAGNOSIS — R10.13 EPIGASTRIC ABDOMINAL PAIN: ICD-10-CM

## 2025-04-29 PROCEDURE — 76700 US EXAM ABDOM COMPLETE: CPT | Mod: 26,,, | Performed by: RADIOLOGY

## 2025-04-29 PROCEDURE — 76700 US EXAM ABDOM COMPLETE: CPT | Mod: TC

## 2025-04-30 DIAGNOSIS — Z78.0 MENOPAUSE: ICD-10-CM

## 2025-05-04 RX ORDER — HEPARIN 100 UNIT/ML
500 SYRINGE INTRAVENOUS
OUTPATIENT
Start: 2025-05-04

## 2025-05-04 RX ORDER — SODIUM CHLORIDE 0.9 % (FLUSH) 0.9 %
10 SYRINGE (ML) INJECTION
OUTPATIENT
Start: 2025-05-04

## 2025-05-12 ENCOUNTER — OFFICE VISIT (OUTPATIENT)
Dept: FAMILY MEDICINE | Facility: CLINIC | Age: 80
End: 2025-05-12
Payer: MEDICARE

## 2025-05-12 VITALS
HEIGHT: 63 IN | TEMPERATURE: 98 F | DIASTOLIC BLOOD PRESSURE: 70 MMHG | WEIGHT: 149.94 LBS | BODY MASS INDEX: 26.57 KG/M2 | SYSTOLIC BLOOD PRESSURE: 114 MMHG | HEART RATE: 68 BPM | OXYGEN SATURATION: 97 %

## 2025-05-12 DIAGNOSIS — F41.1 GAD (GENERALIZED ANXIETY DISORDER): Primary | ICD-10-CM

## 2025-05-12 DIAGNOSIS — I10 PRIMARY HYPERTENSION: ICD-10-CM

## 2025-05-12 DIAGNOSIS — R10.13 EPIGASTRIC ABDOMINAL PAIN: ICD-10-CM

## 2025-05-12 PROCEDURE — 3074F SYST BP LT 130 MM HG: CPT | Mod: CPTII,HCNC,S$GLB, | Performed by: FAMILY MEDICINE

## 2025-05-12 PROCEDURE — 1157F ADVNC CARE PLAN IN RCRD: CPT | Mod: CPTII,HCNC,S$GLB, | Performed by: FAMILY MEDICINE

## 2025-05-12 PROCEDURE — 1101F PT FALLS ASSESS-DOCD LE1/YR: CPT | Mod: CPTII,HCNC,S$GLB, | Performed by: FAMILY MEDICINE

## 2025-05-12 PROCEDURE — 99213 OFFICE O/P EST LOW 20 MIN: CPT | Mod: HCNC,S$GLB,, | Performed by: FAMILY MEDICINE

## 2025-05-12 PROCEDURE — 99999 PR PBB SHADOW E&M-EST. PATIENT-LVL IV: CPT | Mod: PBBFAC,HCNC,, | Performed by: FAMILY MEDICINE

## 2025-05-12 PROCEDURE — 3078F DIAST BP <80 MM HG: CPT | Mod: CPTII,HCNC,S$GLB, | Performed by: FAMILY MEDICINE

## 2025-05-12 PROCEDURE — 1126F AMNT PAIN NOTED NONE PRSNT: CPT | Mod: CPTII,HCNC,S$GLB, | Performed by: FAMILY MEDICINE

## 2025-05-12 PROCEDURE — 1160F RVW MEDS BY RX/DR IN RCRD: CPT | Mod: CPTII,HCNC,S$GLB, | Performed by: FAMILY MEDICINE

## 2025-05-12 PROCEDURE — 1159F MED LIST DOCD IN RCRD: CPT | Mod: CPTII,HCNC,S$GLB, | Performed by: FAMILY MEDICINE

## 2025-05-12 PROCEDURE — 3288F FALL RISK ASSESSMENT DOCD: CPT | Mod: CPTII,HCNC,S$GLB, | Performed by: FAMILY MEDICINE

## 2025-05-12 NOTE — PROGRESS NOTES
Subjective     Patient ID: Ade Castellano is a 79 y.o. female.    Chief Complaint: Follow-up (Results from ultrasound 4/29)    79 year-old female presents for follow up. She has a history of ulcer.s she is here for follow up on her ultrasound of her abdomen. Her ultrasound was normal. She is scheduled for an EGD on 5/20 at 930.   She states her blood pressure has been good as we stopped her metoprolol and replaced it with lexapro. She states she feels better with the lexaprol.         History of Present Illness               Past Medical History:   Diagnosis Date    Abnormal stress test 8/5/2020    Acute respiratory failure with hypoxia and hypercapnia 11/29/2017    Angina pectoris     Arthritis     Bell's palsy     left facial weakness    Breast cancer     RIGHT    CAD (coronary artery disease)     Cervical cancer     Chronic bronchitis     Chronic heart failure with preserved ejection fraction 8/5/2020    Chronic heart failure with preserved ejection fraction 8/5/2020    COPD (chronic obstructive pulmonary disease)     Dr. Katz    Dental bridge present     Emphysema of lung     H/O colonoscopy 06/2017    due for repeat colonsocopy in 6/2018    History of Bell's palsy     History of heart artery stent     Dr. Ortiz  x2 stents    Hyperlipidemia     Hypertension     Lung cancer     Myocardial infarction     SUNITHA (obstructive sleep apnea)     intolerant to mask    SUNITHA (obstructive sleep apnea)     intolerant to mask     Pneumonia     Pneumonia due to other staphylococcus     PUD (peptic ulcer disease)     Severe anemia 6/11/2018    Sleep apnea     Vaginal delivery     x1      Past Surgical History:   Procedure Laterality Date    ADENOIDECTOMY      BREAST BIOPSY Right     x3    BREAST LUMPECTOMY      BREAST SURGERY      lumpectomy right side     CERVIX SURGERY      cone    COLONOSCOPY N/A 3/17/2017    Procedure: COLONOSCOPY;  Surgeon: Julio Rudd MD;  Location: [facility];  Service: Endoscopy;  Laterality: N/A;     COLONOSCOPY N/A 6/30/2017    Procedure: COLONOSCOPY;  Surgeon: Julio Rudd MD;  Location: [facility] ENDO;  Service: Endoscopy;  Laterality: N/A;    COLONOSCOPY N/A 11/28/2018    Procedure: COLONOSCOPY;  Surgeon: Nura Urbina MD;  Location: [facility] ENDO;  Service: Endoscopy;  Laterality: N/A;    COLONOSCOPY N/A 1/29/2019    Procedure: COLONOSCOPY;  Surgeon: Emmanuel Perez MD;  Location: [facility] ENDO;  Service: Endoscopy;  Laterality: N/A;  confirmed appt-SP    COLONOSCOPY N/A 7/26/2022    Procedure: COLONOSCOPY;  Surgeon: James Healy MD;  Location: [facility];  Service: Endoscopy;  Laterality: N/A;  fully vaccinated -  mediport in chest -     CORONARY ANGIOPLASTY WITH STENT PLACEMENT      ESOPHAGEAL MANOMETRY WITH MEASUREMENT OF IMPEDANCE N/A 7/10/2023    Procedure: MANOMETRY, ESOPHAGUS, WITH IMPEDANCE MEASUREMENT;  Surgeon: Miller Phillips MD;  Location: [facility] ([address] [place] [address]);  Service: Gastroenterology;  Laterality: N/A;  pt on oxygen 2.5L  pt requested Tuesday only  5/31 referred by Dr. Miller Phillips/instr. mailed-st  7/3-r/s, updated instructions reviewed with pt in detail via phone, pt verbalized understanding-[facility]    ESOPHAGOGASTRODUODENOSCOPY N/A 1/25/2021    Procedure: EGD (ESOPHAGOGASTRODUODENOSCOPY);  Surgeon: Dmitry Gonzalez MD;  Location: [facility];  Service: Endoscopy;  Laterality: N/A;  rapid test >50 miles -ml    ESOPHAGOGASTRODUODENOSCOPY N/A 11/8/2022    Procedure: EGD (ESOPHAGOGASTRODUODENOSCOPY);  Surgeon: Tapan Stevenson MD;  Location: [facility];  Service: Endoscopy;  Laterality: N/A;  w/dilation  fully vaccinated, instructions mailed-\[facility]\""  11/4 pt called did not receive instructions, does not have portal or email, went over prep instructions and medication instructions with pt on the phone -LW    ESOPHAGOGASTRODUODENOSCOPY N/A 9/19/2023    Procedure: EGD (ESOPHAGOGASTRODUODENOSCOPY);  Surgeon: Miller Phillips MD;  Location: [facility];  Service: Endoscopy;  Laterality: N/A;  botox  "injection  inst mailed    EYE SURGERY Bilateral 06/08/2018    cataract     LEFT HEART CATHETERIZATION Left 8/13/2020    Procedure: Left heart cath, rra, noon;  Surgeon: Guevara Skelton MD;  Location: [facility] CATH LAB;  Service: Cardiology;  Laterality: Left;  RN PREOP 8/7/2020---COVID NEGATIVE ON 8/12    PORTACATH PLACEMENT Right 01/2018    sweat glands axillary regions Bilateral     TONSILLECTOMY       Family History   Problem Relation Name Age of Onset    Cancer Mother          breast    Heart disease Mother      Breast cancer Mother      Cancer Father          lung-smoker     Cancer Sister          lung-smoker     Heart attack Sister      Cancer Maternal Grandmother      Heart disease Maternal Grandmother      Cancer Maternal Grandfather      Heart disease Maternal Grandfather      Cancer Paternal Grandmother      Cancer Paternal Grandfather      Cancer Sister          mets not sure where it started     COPD Sister      Heart disease Sister      Kidney cancer Son      Colon cancer Neg Hx      Esophageal cancer Neg Hx       Social History[1]    Review of Systems         Objective     Vitals:    05/12/25 0855   BP: 114/70   Pulse: 68   Temp: 97.8 °F (36.6 °C)   TempSrc: Oral   SpO2: 97%   Weight: 68 kg (149 lb 14.6 oz)   Height: 5' 3" (1.6 m)        Physical Exam  Constitutional:       Appearance: Normal appearance.   Cardiovascular:      Rate and Rhythm: Normal rate and regular rhythm.      Heart sounds: Normal heart sounds. No murmur heard.     No friction rub. No gallop.   Abdominal:      General: Abdomen is flat. Bowel sounds are normal. There is no distension.      Palpations: Abdomen is soft. There is no mass.      Tenderness: There is no abdominal tenderness. There is no guarding or rebound.      Hernia: No hernia is present.   Neurological:      Mental Status: She is alert.   Psychiatric:         Mood and Affect: Mood normal.         Behavior: Behavior normal.       Physical Exam                Assessment and " Plan     1. JOCELYN (generalized anxiety disorder)  Stable on lexparo.    2. Primary hypertension  Stable without the metoprolol.    3. Epigastric abdominal pain  She is going for an EGD in a ferw weeks.     Ade was seen today for follow-up.    Diagnoses and all orders for this visit:    JOCELYN (generalized anxiety disorder)    Primary hypertension    Epigastric abdominal pain        Assessment & Plan                      No follow-ups on file.        This note was generated with the assistance of ambient listening technology. Verbal consent was obtained by the patient and accompanying visitor(s) for the recording of patient appointment to facilitate this note. I attest to having reviewed and edited the generated note for accuracy, though some syntax or spelling errors may persist. Please contact the author of this note for any clarification.         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     Drug use: No    Sexual activity: Not Currently     Social Drivers of Health     Financial Resource Strain: Low Risk  (3/6/2025)    Overall Financial Resource Strain (CARDIA)     Difficulty of Paying Living Expenses: Not hard at all   Food Insecurity: No Food Insecurity (3/6/2025)    Hunger Vital Sign     Worried About Running Out of Food in the Last Year: Never true     Ran Out of Food in the Last Year: Never true   Transportation Needs: No Transportation Needs (3/6/2025)    PRAPARE - Transportation     Lack of Transportation (Medical): No     Lack of Transportation (Non-Medical): No    Physical Activity: Inactive (3/6/2025)    Exercise Vital Sign     Days of Exercise per Week: 0 days     Minutes of Exercise per Session: 0 min   Stress: No Stress Concern Present (3/6/2025)    [employer] [place] [employer] - Occupational Stress Questionnaire     Feeling of Stress : Only a little   Housing Stability: Low Risk  (3/6/2025)    Housing Stability Vital Sign     Unable to Pay for Housing in the Last Year: No     Homeless in the Last Year: No

## 2025-05-13 NOTE — PROGRESS NOTES
Spoke to patient regarding arrival time, parking and registration location, ride verification, NPO status, prep instructions, no GLP-1 , patient states she no longer taking Plavix.

## 2025-05-14 ENCOUNTER — OFFICE VISIT (OUTPATIENT)
Dept: HEMATOLOGY/ONCOLOGY | Facility: CLINIC | Age: 80
End: 2025-05-14
Payer: MEDICARE

## 2025-05-14 ENCOUNTER — INFUSION (OUTPATIENT)
Dept: INFUSION THERAPY | Facility: HOSPITAL | Age: 80
End: 2025-05-14
Attending: INTERNAL MEDICINE
Payer: MEDICARE

## 2025-05-14 VITALS
TEMPERATURE: 98 F | RESPIRATION RATE: 18 BRPM | DIASTOLIC BLOOD PRESSURE: 84 MMHG | OXYGEN SATURATION: 96 % | HEART RATE: 66 BPM | SYSTOLIC BLOOD PRESSURE: 143 MMHG

## 2025-05-14 VITALS
TEMPERATURE: 97 F | OXYGEN SATURATION: 99 % | DIASTOLIC BLOOD PRESSURE: 81 MMHG | SYSTOLIC BLOOD PRESSURE: 134 MMHG | WEIGHT: 147.25 LBS | HEART RATE: 72 BPM | BODY MASS INDEX: 27.1 KG/M2 | HEIGHT: 62 IN

## 2025-05-14 DIAGNOSIS — J96.11 CHRONIC RESPIRATORY FAILURE WITH HYPOXIA: ICD-10-CM

## 2025-05-14 DIAGNOSIS — R91.1 PULMONARY NODULE: ICD-10-CM

## 2025-05-14 DIAGNOSIS — C34.90 SQUAMOUS CELL CARCINOMA LUNG: Primary | ICD-10-CM

## 2025-05-14 DIAGNOSIS — E83.42 HYPOMAGNESEMIA: ICD-10-CM

## 2025-05-14 DIAGNOSIS — J44.9 CHRONIC OBSTRUCTIVE PULMONARY DISEASE, UNSPECIFIED COPD TYPE: ICD-10-CM

## 2025-05-14 DIAGNOSIS — C50.919 RECURRENT BREAST CANCER, UNSPECIFIED LATERALITY: Primary | ICD-10-CM

## 2025-05-14 DIAGNOSIS — C50.919 RECURRENT BREAST CANCER, UNSPECIFIED LATERALITY: ICD-10-CM

## 2025-05-14 DIAGNOSIS — Z85.118 HISTORY OF LUNG CANCER: ICD-10-CM

## 2025-05-14 LAB
ABSOLUTE EOSINOPHIL (OHS): 0.07 K/UL
ABSOLUTE MONOCYTE (OHS): 0.4 K/UL (ref 0.3–1)
ABSOLUTE NEUTROPHIL COUNT (OHS): 3.17 K/UL (ref 1.8–7.7)
ALBUMIN SERPL BCP-MCNC: 3.9 G/DL (ref 3.5–5.2)
ALP SERPL-CCNC: 45 UNIT/L (ref 40–150)
ALT SERPL W/O P-5'-P-CCNC: 14 UNIT/L (ref 10–44)
ANION GAP (OHS): 9 MMOL/L (ref 8–16)
AST SERPL-CCNC: 17 UNIT/L (ref 11–45)
BASOPHILS # BLD AUTO: 0.06 K/UL
BASOPHILS NFR BLD AUTO: 1.3 %
BILIRUB SERPL-MCNC: 0.6 MG/DL (ref 0.1–1)
BUN SERPL-MCNC: 14 MG/DL (ref 8–23)
CALCIUM SERPL-MCNC: 8.8 MG/DL (ref 8.7–10.5)
CHLORIDE SERPL-SCNC: 105 MMOL/L (ref 95–110)
CO2 SERPL-SCNC: 25 MMOL/L (ref 23–29)
CREAT SERPL-MCNC: 0.8 MG/DL (ref 0.5–1.4)
ERYTHROCYTE [DISTWIDTH] IN BLOOD BY AUTOMATED COUNT: 14.5 % (ref 11.5–14.5)
GFR SERPLBLD CREATININE-BSD FMLA CKD-EPI: >60 ML/MIN/1.73/M2
GLUCOSE SERPL-MCNC: 116 MG/DL (ref 70–110)
HCT VFR BLD AUTO: 41.3 % (ref 37–48.5)
HGB BLD-MCNC: 13.1 GM/DL (ref 12–16)
IMM GRANULOCYTES # BLD AUTO: 0.02 K/UL (ref 0–0.04)
IMM GRANULOCYTES NFR BLD AUTO: 0.4 % (ref 0–0.5)
LYMPHOCYTES # BLD AUTO: 0.94 K/UL (ref 1–4.8)
MAGNESIUM SERPL-MCNC: 1.9 MG/DL (ref 1.6–2.6)
MCH RBC QN AUTO: 28.7 PG (ref 27–31)
MCHC RBC AUTO-ENTMCNC: 31.7 G/DL (ref 32–36)
MCV RBC AUTO: 90 FL (ref 82–98)
NUCLEATED RBC (/100WBC) (OHS): 0 /100 WBC
PLATELET # BLD AUTO: 187 K/UL (ref 150–450)
PMV BLD AUTO: 9.1 FL (ref 9.2–12.9)
POTASSIUM SERPL-SCNC: 3.7 MMOL/L (ref 3.5–5.1)
PROT SERPL-MCNC: 6.8 GM/DL (ref 6–8.4)
RBC # BLD AUTO: 4.57 M/UL (ref 4–5.4)
RELATIVE EOSINOPHIL (OHS): 1.5 %
RELATIVE LYMPHOCYTE (OHS): 20.2 % (ref 18–48)
RELATIVE MONOCYTE (OHS): 8.6 % (ref 4–15)
RELATIVE NEUTROPHIL (OHS): 68 % (ref 38–73)
SODIUM SERPL-SCNC: 139 MMOL/L (ref 136–145)
WBC # BLD AUTO: 4.66 K/UL (ref 3.9–12.7)

## 2025-05-14 PROCEDURE — 83735 ASSAY OF MAGNESIUM: CPT | Mod: HCNC

## 2025-05-14 PROCEDURE — 80053 COMPREHEN METABOLIC PANEL: CPT | Mod: HCNC

## 2025-05-14 PROCEDURE — 36591 DRAW BLOOD OFF VENOUS DEVICE: CPT | Mod: HCNC

## 2025-05-14 PROCEDURE — 99999 PR PBB SHADOW E&M-EST. PATIENT-LVL IV: CPT | Mod: PBBFAC,HCNC,, | Performed by: INTERNAL MEDICINE

## 2025-05-14 PROCEDURE — A4216 STERILE WATER/SALINE, 10 ML: HCPCS | Mod: HCNC | Performed by: INTERNAL MEDICINE

## 2025-05-14 PROCEDURE — 63600175 PHARM REV CODE 636 W HCPCS: Mod: HCNC | Performed by: INTERNAL MEDICINE

## 2025-05-14 PROCEDURE — 85025 COMPLETE CBC W/AUTO DIFF WBC: CPT | Mod: HCNC

## 2025-05-14 PROCEDURE — 25000003 PHARM REV CODE 250: Mod: HCNC | Performed by: INTERNAL MEDICINE

## 2025-05-14 RX ORDER — SODIUM CHLORIDE 0.9 % (FLUSH) 0.9 %
10 SYRINGE (ML) INJECTION
OUTPATIENT
Start: 2025-05-14

## 2025-05-14 RX ORDER — HEPARIN 100 UNIT/ML
500 SYRINGE INTRAVENOUS
OUTPATIENT
Start: 2025-05-14

## 2025-05-14 RX ORDER — HEPARIN 100 UNIT/ML
500 SYRINGE INTRAVENOUS
Status: DISCONTINUED | OUTPATIENT
Start: 2025-05-14 | End: 2025-05-14 | Stop reason: HOSPADM

## 2025-05-14 RX ORDER — SODIUM CHLORIDE 0.9 % (FLUSH) 0.9 %
10 SYRINGE (ML) INJECTION
Status: DISCONTINUED | OUTPATIENT
Start: 2025-05-14 | End: 2025-05-14 | Stop reason: HOSPADM

## 2025-05-14 RX ADMIN — Medication 10 ML: at 08:05

## 2025-05-14 RX ADMIN — HEPARIN 500 UNITS: 100 SYRINGE at 08:05

## 2025-05-14 NOTE — PLAN OF CARE
Pt tolerated PAC labs. Port with + blood return, cbc cmp, mag drawn. Flushed easily, Heparin administered to dwell. Pt to RTC for port flush 7/9/25. Pt ambulated off of unit and directed to MD appt with Dr. Holliday. Pt left in NAD.

## 2025-05-14 NOTE — PROGRESS NOTES
Subjective:       Patient ID: Ade Castellano is a 79 y.o. female.    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 The PET(+) disease in the right axilla and right supraclavicular fossa will be boosted at 200 cGy per fraction to 6000 cGy total dose.  S/p LYMPH NODE, RIGHT AXILLA, BIOPSY: 6/16/21 . Metastatic poorly-differentiated carcinoma, c/w breast primary ERnegPRneg Her2 neg  PD-L1 (22C3) IHC CPS> is greater than or equal to 10  Pembrolizumab 7/22/21 -6/15/22         Prior Hx:  80 y/o female with history of  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  And Rt  breast [place]/p  cycle 6 of carboplatin/Abraxane 7/2/2018   She has  History of Stage 1A  Rt breast cancer Grade 3, ER/WV neg , Her 2 jose maria neg s/p lumpectomy 7/11/2014 and s/p adjuvant RT 9/2014 Pt declined Adjuvant chemo.Pathology showed a 1.15 cm, grade 3 infiltrating ductal carcinoma.  One sentinel lymph node was negative for malignancy.  Margins were negative and the closest margin was 1 cm.  She was staged  pT1c  pN0 cM0 stage IA.  She declined adjuvant chemo therapy.  She completed post operative radiation therapy at 400 cGy per fraction to 4000 cGy 9/2014  Pt hospitalized 11/2017 for  acute on chronic respiratory failure requiring intubation and ventilation. Has large lung mass in right lung with probable post obstructive pneumonia resulting in COPD exacerbation.CTA  chest 11/29/2017 revealed   Large right hilar mass with involvement of adjacent segmental pulmonary arterial branches and bronchi with associated postobstructive atelectasis and volume loss in the RML  with adjacent ground glass opacity concerning for underlying neoplastic process. Mediastinal and axillary adenopathy, with a right axillary lymph node measuring up to 2.0 cm in short axis diameter.She underwent rt axillary LN bx at outside facility- on 1/16/2018 at [facility]. Pathology revealed metastatic poorly differentiated carcinoma of unknown primary site. She next underwent lung bx at [facility] 1/31/2018  benign lung tissue. Repeat Right Lung Bx 2/14/2018 Pathology reveals Squamous cell carcinoma PD-L1 10% low expression EGFR NEG ALK NEG BRAF NEG. Outside slides axillary LN specimen were reviewed/comparison to lung bx findings . She completed    cycle 6 of carboplatin/Abraxane completed 7/2018 .PET/CT  4/19/2018 revealed there has been a excellent response to therapy.  At least 90% reduction in malignant activity.PET/CT 2/21/2019 increased size and irregularity of the right axillary lymph node with increased FDG avidityMAMMO/US rt breast 2/2019 revealed suspicious findings rt axilla LN.  Right axilla, mass, core biopsy: Positive for poorly differentiated carcinoma breast primary ER neg/NV neg Her2 neg.[facility] reviewIt was determined  the lung tumor corresponds to a squamous cellcarcinoma and appears to be unrelated to the invasive ductal carcinoma of the breast. The axillary mass, in myopinion, corresponds to metastases of the breast primary. Although it is a poorly differentiated carcinoma, theimmunophenotype is most consistent with the one that the breast primary exhibited, and is inconsistent with metastatic squamous cell carcinoma. She was treated with  AC ( adriamycin 60m/m2/Cytoxan 600mg/m2)  q21d x 4 cycles completed 9/6/2019 . PET/CT 9/19/2019 shows disease response l decrease in  size and uptake of several right axillary lymph nodes and a supraclavicular lymph node.  Mild residual activity may indicate viable tumor.Pt followed by Rad/Onc. She was presented at [facility] tumor board and it was determined to proceed Radiation therapy only. No ALND due to concurrent lung and supraclavicular node. She  completed  RT 12/16/2019  To right axilla and right supraclavicular region PET/CT imaging  5/20/21 shows Continued increase in both size and hypermetabolic activity of right axillary level 1 lymph nodes a new, mildly hypermetabolic cutaneous thickening of the right breast. she underwent LYMPH NODE, RIGHT AXILLA, BIOPSY: 6/16/21 . Pathology showed Metastatic poorly-differentiated carcinoma, consistent with breast primary-ERneg/ PRneg  HER2  neg  Pt started on pembrolizumab 7/2021  Pt hospitalized 4/6/22 with hypokalemia and orthostatic hypotension  S/p C16 pembrolizumab 6/15/22  ( 400mg q6wks)  hospitalized with  dizziness and possible TIA   Patients etiology appears to be secondary to small vessel disease. Recommendations were to continue Aspirin 81 mg daily, plavix 75 mg for 21 days followed by ASA 81 mg monotherapy thereafter.     Interval Hx: She is nervous/anxious   She does not want to wear 02 out of house fear of stigma  Pox RA 99 %   She is now f/b pulmonology, Dr. Masterson chronic hypoxemic respiratory failure 2/2 copd   She continues with chronic LBP , stable  Appetite and weight stable  She has chronic SUNITHA , untreated, declines titration study     Follow up CT planned 6/2025      CT chest w/con 3/6/25 shows  Interval development of bilateral subsegmental consolidative opacities and scattered clustered centrilobular pulmonary nodules, likely sequela of a small airways infectious or inflammatory process.     PrevHx:She had an abnormal mammogram 6/4/2014 whichRevealed a round solid mass 6mm in rt breast.She then underwent U/S guided core bx of rt breast mass on 6/17/2014.Pathology  "revealed infiltrating ductal carcinoma Grade 3 with tumorPresent in thin-walled spaces suggestive of lymphatic spaces. HormoneReceptor status on tumor specimen revealed ER negative 0% ,NC negative 0% and Her 2 jose maria negative.She subsequently underwent rt segmental mastectomy and SLN bxOn 7/11/2014. Pathology of rt breast lumpectomy revealed invasiveDuctal carcinoma with micropapillary pattern ( Invasive micropapillaryCa) with max tumor dimension 11.5mm with suggestion of tumor in Thin walled spaces c/w lymphovascular involvement. SurgicalMargins free of tumor, grade 3, ER/NC neg , Her 2 jose maria neg With [place] Lymph node negative for Neoplasm. Pathologic staging [id number](i-)Her mother was diagnosed with breast cancer in her 50's/        Review of Systems   Constitutional: Positive for mild  fatigue, stable No  activity change,  fever.   HENT:  Negative for mouth sores, rhinorrhea.  Eyes: Negative for visual disturbance.   Respiratory: Positive for chronic  LOGAN,Negative for wheezing.    Cardiovascular: Negative for chest pain.   Gastrointestinal:  Negative for abdominal pain and diarrhea.   Genitourinary: Negative for frequency.   Musculoskeletal: Positive for chronic LBP, stable   Skin: Negative for rash.   Neurological: Negative for dizziness and headaches.   Hematological: Negative for adenopathy.   Psychiatric/Behavioral: The patient is not nervous/anxious.        Objective:        Vitals:    05/14/25 0940   BP: 134/81   Pulse: 72   Temp: 97.2 °F (36.2 °C)   SpO2: 99%   Weight: 66.8 kg (147 lb 4.3 oz)   Height: 5' 2" (1.575 m)                 CT a/p w/contrast 11/29/2018   1. No acute abnormality identified within the abdomen and pelvis.    2.  There are a few nonspecific prominent lymph nodes in the upper abdomen including a periesophageal lymph node which measures 1.0 cm in short axis diameter.    3.  Significant abdominal aortic atherosclerosis and abdominal aorta ectasia.    4.  Additional findings as " above.    PET/CT 1/26/2018   1.  Intense FDG uptake within the known right infrahilar lung mass, compatible with malignancy.  It is unclear if this represents primary lung malignancy or metastatic breast cancer.    2.  Intensely hypermetabolic right axillary, retropectoral, and lower right cervical lymph nodes, compatible with metastases.    3.  Abnormal FDG uptake along right breast skin thickening, new from prior chest CTA and mammogram.  This may relate to localized edema/inflammation, though correlation with physical exam and mammography are recommended to exclude underlying inflammatory carcinoma.      MRI Brain w w/out contrast 2/9/2018  1.  No evidence of intracranial metastases.  2.  Sinus disease       Rt axillary LN bx at outside facility- on 1/16/2018 at [facility]  Pathology revealed metastatic poorly differentiated carcinoma of unknown primary  site 1/16/2018 at [facility]  TTF-1 negative, napsin negative  , cytokeratin 7 positive, cytokeratin 20 negative, p63 negative, cytokeratin 5/6 focal dim positivity    LUNG BIOPSY 1/31/2018  Pathology revealed benign lung tissue         SPECIMEN  1) Right Lung Bx 2/14/2018  Supplemental Diagnosis  Immunohistochemical stains show strong nuclear staining for p63 in essentially all tumor cells and very strong  cytoplasmic and membrane staining for CK5/6, also in essentially all tumor cells. TTF-1 and CK7 are negative  within tumor cells but do stain native pulmonary elements present within the biopsy. A stain for mucicarmine is  negative. Positive and negative controls function appropriately.  Final diagnosis: Specimen submitted as right lung biopsy  -Squamous cell carcinoma  (Electronically Signed: 2018-02-20 09:12:07 )  Diagnosed by: Phong Thompson  FINAL PATHOLOGIC DIAGNOSIS  Fragments of pulmonary parenchyma (submitted as right lung biopsy):    FINAL PATHOLOGIC DIAGNOSIS 1. Lymph node, right axilla, biopsy, review of 10 outside slides  [facility], JS [phone],  collected January 11, 2018: Metastatic poorly differentiated carcinoma (see comment).  The histologic section shows fibrous stroma infiltrated by nest of poorly differentiated malignant cells including  occasional dyskeratotic cells morphologically suggestive of metastatic squamous cell carcinoma.  Immunohistochemical stains are nonspecific; the cells are positive for cytokeratin 7 and cytokeratin 5/6 (focal) and  negative for cytokeratin 20, TTF-1, p63, GCDFP, mammoglobin, and CEA        PET/CT 2/21/2019  Increased size and irregularity of the right axillary lymph node with increased FDG avidity.  Although this would be atypical in location for lung carcinoma, the increased size and avidity must be concerning for metastatic disease in this patient with history of squamous cell lung cancer.     US Rt breast and mammo 2/26/2019  Impression:  Right  Lymph Node: Right axilla lymph node. Assessment: 4 - Suspicious finding. Biopsy is recommended.      BI-RADS Category:   Right: 4 - Suspicious  Overall: 4 - Suspicious     Recommendation:  Biopsy is recommended. Biopsy of the lymph node measuring 1.4 x 1.1 x 1.3 recommended , the one with the round shape configuration, corresponding to the most recent PET CT finding.         Pathology 3/11/2019   FINAL PATHOLOGIC DIAGNOSIS  Right axilla, mass, core biopsy:  Positive for poorly differentiated carcinoma.  See comment.  Comment:  The biopsy from the right axilla mass shows a poorly differentiated carcinoma in background lymphoid tissue  with enlarged cells showing increased nuclear size, prominent nucleoli, moderate amounts of cytoplasm and mitotic  figures. Immunostains are completed and reveal the tumor cells to stain positively with cytokeratin AE 1/AE3, CK  5/6 and CK 7. The tumor cells are negative for CK 20, Estrogen receptor, p63 and TTF-1. All stains have  satisfactory positive and negative controls. The patient's prior  cases will be reviewed and an additional stain for  JUAREZ-3 is pending in an attempt to pinpoint the primary site of this malignancy and results will follow in a  supplemental report.      Supplemental Diagnosis  3/11/2019  The current axillary mass is compared to the patient's prior right lung biopsy (case number [id number]) and the  current axillary mass is morphologically similar to the lung malignancy. Also, the current axillary mass is compared  to the patient's prior breast resection (Case number [id number]) and appears similar as well. P63 is negative in the  [id number] tumor and CK 5/6 shows scattered positive staining in the [id number] tumor.  Immunostain for JUAREZ-3 is completed and shows only rare weak to moderate staining within the tumor cell nuclei  with satisfactory positive and negative controls. This stain is positive in the surrounding lymphocytes. This staining  pattern is non-specific and does not definitively differentiate between a lung and breast primary malignancy in this  right axilla mass biopsy. The staining profile of the current axillary mass match more closely with the previous  breast carcinoma (due to the p63 negativity in both the 2014 breast cancer and the current axillary mass lesion but  p63 positivity in the lung mass from 2018).  This staining profile, together with the comparison with the patient's prior breast tumor and prior lung tumor supports  a diagnosis of poorly differentiated carcinoma and the malignancy appears morphologically similar to the prior  malignancies from both sites, but the staining profile in this small sample from the axillary mass more closely  correlates with the prior breast malignancy. Pathologic subclassification of this malignancy's primary location is not  definitive and clinical correlation is recommended definitively decide on possible primary location in this patient's  axillary mass sample.  Supplemental (2):  Additional immunohistochemical staining  for progesterone receptor and HER2 are completed per clinician request  with following results:  Progesterone receptor: Negative; 0% nuclear tumor cell staining.  HER2: Negative; stain score = 0.  Supplemental (3):  [place] DIAGNOSIS:  FINAL DIAGNOSIS  Breast, right, needle core biopsy ([id number]; 06/17/2014): Invasive ductal carcinoma, Soper grade III (of  III), with focal micropapillary features.  Immunohistochemical stains performed at the referring institution show that the tumor cells have the following  phenotype: - Estrogen receptor: Negative (0% tumor cells staining). - Progesterone receptor: Negative (0% tumor  cells staining). - HER2: Negative (score 0).  Lymph nodes, right, sentinel biopsy and lumpectomy ([id number]; 07/11/2014):  1. Right sentinel lymph node: A single (1) lymph node is negative for metastatic carcinoma.  Immunohistochemical stains performed by the referring institution and reviewed at [facility] for cytokeratin are  negative, confirming the diagnosis.  2. Right breast: Invasive ductal carcinoma with micropapillary features, per report 11.5 mm in greatest dimension.  Foci suspicious for lymphovascular invasion identified. Margins of resection are free of tumor.  Immunohistochemical stains performed by the referring institution and reviewed at [facility] show that the tumor  cells are negative for p63 and show patchy positivity for CK 5/6.  Lung, right, needle core biopsy ([id number]; 02/14/2018): Squamous cell carcinoma.    Immunohistochemical stains performed by the referring institution show the tumor cells are strongly positive for p63  and CK 5/6, while negative for TTF-1 and CK7. These result support the diagnosis. Axilla, right, needle core biopsy  ([id number]; 03/11/2019): Lymph node positive for metastatic carcinoma, most consistent with breast primary  (see comment).  Immunohistochemical stains performed by the referring institution and reviewed at [facility] show that the  tumor  cells are negative for p63, focally positive for CK 5/6, focally and weakly positive for JUAREZ-3, and strongly positive  for CK7. They are also negative for estrogen receptor, progesterone receptor, and HER2 JAM (score 0).  COMMENT:  Thank you for allowing me to review the case of this 73-year-old lady who recently underwent needle core biopsies  of a right axillary mass and who has a previous diagnosis of an invasive ductal carcinoma of the right breast, as well  as a squamous cell carcinoma of the lung. I am reviewing this case because of my special interest in breast  pathology.  I agree with your interpretation of this case. In my opinion, the lung tumor corresponds to a squamous cell  carcinoma and appears to be unrelated to the invasive ductal carcinoma of the breast. The axillary mass, in my  opinion, corresponds to metastases of the breast primary. Although it is a poorly differentiated carcinoma, the  immunophenotype is most consistent with the one that the breast primary exhibited, and is inconsistent with a  metastatic squamous cell carcinoma. I did share the case with Dr. Anabel Stone, one of our pulmonary pathologists,  and she concurs with this interpretation.  Note: Report attached.  Performing location:  [place] [address]      LYMPH NODE, RIGHT AXILLA, BIOPSY: 6/16/21   - Metastatic poorly-differentiated carcinoma, consistent with breast primary  -ER, WI, and HER2 immunohistochemical hormonal markers are also negative  PD-L1 (22C3) SemiQuant IHC, Manual  Interpretation  Right axillary lymph node , specimen for PD-L1 immunohistochemistry studies (clone 22C3, [facility]  [facility], [place], [place]; using a proprietary detection system) ([id number]):  Reported carcinoma site of origin: Breast  Result: The percent of PD-L1 positive cells based upon the total number of tumor cells (combined positive  score, CPS) is greater than or  equal to 10.  Interpretation: Studies suggest that positive PD-L1 immunohistochemistry in tumor cells and/or tumorassociated  immune cells may predict tumor response to therapy with immune checkpoint inhibitors. This  result should not be used as the sole factor in determining treatment, as other factors (for example, tumor  mutation burden, and microsatellite instability) have been also studied as predictive markers.          CT neck/chest/abd/pelvis w/contrast 4/26/2019   The previously described right hilar mass is no longer visible.  There is persistent volume loss in the right middle lobe this appears similar to the prior PET-CT February 21, 2019.    Persistent abnormal right axillary node today measuring about 15 mm compared 18 mm prior.  The positioning of the adjacent nodes is slightly different likely accounting for the slight difference in measurement.    Cluster of tree-in-bud nodular densities left lower lobe series 2, image 93.  This may be related to small airways disease though serial follow-up suggested.      PET/CT 6/20/2019   New and worsening hypermetabolic lymph nodes concerning for metastatic breast cancer as described above.  Tissue sampling would be required for definitive diagnosis.      2-D echo 6/27/2019   Normal left ventricular systolic function. The estimated ejection fraction is 55%  Concentric left ventricular remodeling.  Normal LV diastolic function.  Normal right ventricular systolic function.  Moderate left atrial enlargement.  Mild right atrial enlargement.  Mild aortic regurgitation.  Mild mitral regurgitation.  Mild tricuspid regurgitation.  Normal central venous pressure (3 mm Hg).  The estimated PA systolic pressure is 25 mm Hg      PET/CT 9/19/2019   Favorable response to therapy with interval decrease in size and uptake of several right axillary lymph nodes and a supraclavicular lymph node.  Mild residual activity may indicate viable tumor.    No new hypermetabolic findings  worrisome for malignancy.    PET/CT 2/28/2020  1.  No evidence of hypermetabolic tumor.  Continued improvement of the right axilla status post adjuvant radiation.    2.  No new hypermetabolic lesions      CTA 6/17/2020  1. No evidence of pulmonary embolus. No aortic dissection.   2. There is no evidence for new or progressive disease in the chest as compared to PET/CT 6/20/2019 in this patient with history of right breast cancer and right lung neoplasm. The patient does have a more recent PET/CT 2/28/2020 from an outside facility per the electronic medical record although images are not available for direct comparison. Correlation with these images may be beneficial. Continued long-term surveillance recommended.  3. Stable appearance of the treated tumor in the right hilum/middle lobe.  4. Stable treated right breast cancer and axillary adenopathy without evidence for developing breast mass or new right axillary adenopathy.  5. Stable tiny nodularity/tree-in-bud densities in the left lung, probably postinfectious or inflammatory.  6. Wall thickening of the esophagus may be correlated for esophagitis. Possible tiny hiatus hernia.  7. Slight bronchial wall thickening may be correlated for bronchitis.  8. Mild to moderate emphysema as before.  9. Reflux of contrast into the IVC and hepatic veins is nonspecific but may be correlated for elevated right heart pressures.  10. Coronary calcifications and/or stents similar to prior.    Echo 5/21/2020  Technically difficult study.   Normal left ventricular systolic function.   Left ventricular ejection fraction is estimated at 55%.   Severe mitral annular calcification.   Mild mitral valve regurgitation.   Aortic valve sclerosis without stenosis or regurgitation.     PFTs 6/17/2020  Spirometry reveals a very severe obstructive lung defect, which does not significantly improve post bronchodilators. Lung volumes revealed air trapping. Diffusing capacity was moderately  impaired.        Del 6/26/2020  BI-RADS Category:   Overall: 2 - Benign      PET/CT 10/23/2020   Impression:     Stable mildly hypermetabolic right axillary lymph node/nodular density contralateral to the site of injection.  Differential considerations include metastatic lymph node, reactive lymph node from benign right upper extremity process, and fat necrosis.  Recommend physical examination of the upper extremity and consideration of ultrasound for further evaluation of the node/nodule and possible image guided biopsy.  Otherwise, attention on follow-up.     Otherwise, no other hypermetabolic disease identified      Mammo diagnostic right /US 11/4/2020   Impression:   1. No suspicious finding either on mammogram or ultrasound at the site of the palpable lump in the right breast at 10:00 o'clock. A bilateral mammogram is recommended in June 2020 to return to the patient to annual screening.   2. Redemonstration of the morphologically abnormal lymph node in the right axilla that contains a biopsy clip, compatible with the known recurrence metastatic breast cancer that has been treated with chemotherapy. The appearance of this lymph node is unchanged from 06/26/2020 and overall appears smaller when compared to 03/11/2019.     Abd US 12/11/2020   Impression:     1. Echogenic liver likely representing hepatic steatosis.  2. Right upper quadrant ultrasound otherwise is unremarkable.     PET/CT 10/14/21     Impression:     1.  No definite evidence of hypermetabolic tumor.  Interval resolution of hypermetabolic right axillary lymph nodes.  No new hypermetabolic findings.     2.  Persistent low-grade diffuse cutaneous uptake which is nonspecific.    MRI shoulder w w/out contrast 1/26/22   Impression:     Mild edema and postcontrast enhancement at the myotendinous junction of the supraspinatus and infraspinatus, it is nonspecific and could be due to degenerative change or tendinosis.     Small area of interstitial tear at  the footprint insertion of the infraspinatus tendon.     No large rotator cuff tear.     No advanced degenerative change.     No osseous lesions.     PET/CT 1/26/22  Impression:In this patient with breast cancer, there is no evidence of hypermetabolic tumor to suggest recurrent or metastatic disease.   Less extensive but, nonspecific, low-grade diffuse cutaneous uptake of the right breast.       PET/CT 4/27/22  Impression:     1.  No FDG avid disease to suggest recurrence or metastatic disease.     Unchanged right breast skin thickening with mild FDG uptake.       PET/CT 7/13/22   Impression:     In this patient with lung and breast cancer, there is new left lower lobe opacification with mild radiotracer uptake which may represent aspiration, pneumonia, or malignancy.  Tissue sampling would be required for definitive diagnosis.     Unchanged right breast skin thickening with mild radiotracer uptake.    CT chest w/contrast 8/25/22    Decreasing patchy pulmonary consolidation within the left lower lobe when compared to prior PET-CT scan dated 07/13/2022.  The overall appearance of the patchy consolidation as well as the moderate improvement when compared to the prior study suggest a benign etiology such as left lower lobe pneumonia.     Persistent band of atelectasis/scarring within the right middle lobe, stable dating back to 04/26/2019.     Coronary artery atherosclerosis in a multi vessel distribution.     There are no measurable lesions per RECIST criteria.    PET /CT  11/21/22 shows New right lower lobe infiltrate worrisome for pneumonia or aspiration.Chronic infiltrate right middle lobe.   Resolution of the left lower lobe infiltrate.    Mammo 7/26/22  BI-RADS Category:   Overall: 2 - Benign        CT chest with contrast 11/21/22    Impression:     New right lower lobe infiltrate worrisome for pneumonia or aspiration.     Chronic infiltrate right middle lobe.     Resolution of the left lower lobe infiltrate.      Coronary artery calcifications.         PET/CT 1/11/23  Impression:     1. In this patient with lung and breast cancer, there is stable right breast skin thickening with mild radiotracer uptake.  2. Interval resolution of hypermetabolic opacification in the left lower lobe as compared to FDG PET-CT 07/14/2022.  Interval improvement in patchy opacities in the right lower lobe as compared to CT 11/21/2022    .                Electronically Signed By: Tapan Young MD 4/16/2023 23:17 CDT, [facility]  Narrative    PET/CT 4/14/23  [facility]  HISTORY:       Malignant neoplasm of female breast, unspecified estrogen receptor status, unspecified laterality, unspecified site of breast (CMS/HCC).       ICD10:  C50.919   Malignant neoplasm of female breast, unspecified estrogen receptor status, unspecified laterality, unspecified site of breast (CMS/HCC)       REFERENCE EXAMS:     7/13/2022 PET CT ([facility]) (no images, report only)     6/20/2019 PET CT ([facility])       TECHNIQUE:     PET/CT with attenuation correction.     Dose:  13.62 mCi of FDG-18     Injection site:  left wrist     Field of view:  Skull base to thighs.     Arm position:  above head     Baseline glucose:  128 mg/dL       FINDINGS:       All SUV values are max SUV.     Head/Neck:     Physiologic uptake of radiotracer.         Thorax:     Right portacath.       Cutaneous thickening in the right breast (SUV=1.58).       Calcification of the mitral valve, similar to 6/20/2019.       Coronary artery atherosclerotic calcification.     Atelectasis/scarring along the inferior aspect of the right major fissure, similar to 6/20/2019.         Abdomen/Pelvis:     Physiologic uptake in the genitourinary system.     Physiologic uptake in the intestines.       Patchy atherosclerotic calcification in the aorta and iliac arteries.       Impression      No opacity in the left lower lobe on the current exam to correspond to a left lower lobe opacity  described on the 7/13/2022 PET CT report.  Images from the 7/13/2022 PET CT are not available at the time of this report.       Cutaneous thickening in the right breast with mild uptake.  There was a similar finding described on the 71/3/2022 PET CT report.           Electronically Signed By: Tapan Young MD 4/16/2023 23:17 CDT, [facility]          Mammo screening bilateral 8/7/23  BI-RADS Category: Overall: 2 - Benign     PET /CT ( NOT DOTATATE) 4/14/23 No opacity in the left lower lobe on the current exam to correspond to a left lower lobe opacity described on the 7/13/2022 PET CT report.  Images from the 7/13/2022 PET CT are not available at the time of this report.   Cutaneous thickening in the right breast with mild uptake.  There was a similar finding described on the 71/3/2022 PET CT report.       PET/CT 11/14/23    Impression:     Similar appearing right breast skin thickening with mild hypermetabolic activity.  No new focal abnormal tracer uptake elsewhere.      EXAMINATION: 3/19/24   NM PET CT FDG SKULL BASE TO MID THIGH     CLINICAL HISTORY:  Breast cancer, invasive, stage IV, assess treatment response; Malignant neoplasm of unspecified site of unspecified female breast     TECHNIQUE:  12.8 mCi of F18-FDG was administered intravenously in the left antecubital fossa.  After an approximately 60 min distribution time, PET/CT images were acquired from the skull base to mid thigh.  Transmission images were acquired to correct for attenuation using a whole body low-dose CT scan without IV contrast with the arms positioned above the head. Glycemia at the time of injection was 116 mg/dL.     COMPARISON:  NM PET-CT 11/14/2023, 01/11/2023     FINDINGS:  Quality of the study: Adequate.     In the head and neck, there are no hypermetabolic lesions worrisome for malignancy. There are no hypermetabolic mucosal lesions, and there are no pathologically enlarged or hypermetabolic lymph nodes.   Prominent scattered physiologic muscular activity.     In the chest, there is similar appearing mild right breast skin thickening with decreased hypermetabolic activity now measuring 2.3 (image 103), previously SUV max 3.2.     There is a 3 mm left axillary lymph node with mild uptake, SUV max 2.6 (axial image 56), favored to represent reactive etiology.  Attention on follow-up.     There are no concerning pulmonary nodules or masses, and there are no pathologically enlarged or hypermetabolic lymph nodes.  Prominent physiologic tracer activity throughout thoracic musculature.     In the abdomen and pelvis, there is physiologic tracer distribution within the abdominal organs and excretion into the genitourinary system.  Similar mildly hypermetabolic focus in the 1st portion of the duodenum without CT correlate measuring SUV max 5.5 (image 134), possibly physiologic although nonspecific.     In the bones, there are no hypermetabolic lesions worrisome for malignancy.  Prominent focus of hypermetabolic uptake in the left piriformis without underlying CT correlate (image 220), presumably physiologic.     Additional findings: Right chest wall implanted tunnel central venous catheter tip terminating in the superior vena cava.  Stable punctate pulmonary micronodule in the left upper lobe (axial series 3, image 73).  Coronary arterial and valvular calcific plaque.  Advanced aortoiliac calcific plaque.     Impression:     Similar appearing mild right breast skin thickening with decreased hypermetabolic activity. No new abnormal tracer uptake elsewhere worrisome for malignancy.     Additional findings as above.    CT c/a/p 5/3/24   Impression:     No acute abdominopelvic abnormality.  Specifically, no evidence for acute pancreatitis as clinically questioned.     Vague area of peripheral ground-glass with grouped micro-nodules at the peripheral right lung base with appearance suggesting sequela of infectious or non-infectious  bronchiolitis.     Similar degree of asymmetric skin thickening at the right breast.  To correlate with mammographic and oncologic history.     Ectatic infrarenal abdominal aorta and additional findings as above.       Assessment:       1. Recurrent breast cancer, unspecified laterality    2. History of lung cancer    3. Pulmonary nodule    4. Chronic respiratory failure with hypoxia    5. Chronic obstructive pulmonary disease, unspecified COPD type                Plan:     1.,2.   Pt with hx of Stage 1A invasive ductal carcinoma rt breast s/p rt segmental mastectomy and SLN bx 7/11/2014 ER/AL neg HER 2 jose maria neg [id number](i-).  S/p adjuvant RT completed 9/2014 Pt declined adjuvant chemo  Pt  hospitalized 11/2017 with acute-on-chronic  resp failure  Abnormal CT imaging revealing Large right hilar mass with involvement of  adjacent segmental pulmonary arterial branches and bronchi with associated postobstructive atelectasis  and involvement of mediastinal and axillary adenopathy   S/p rt axillary LN bx ( outside facility) - metastatic poorly differentiated carcinoma of unknown primary  site  status post  lung biopsy 1/31/2017 -benign lung tissue  PET/CT 1/26/2018   Intense FDG uptake within the known right infrahilar lung mass, compatible with malignancy.   Intensely hypermetabolic right axillary, retropectoral, and lower right cervical lymph nodes, compatible with metastases.  Abnormal FDG uptake along right breast skin thickening, new from prior chest CTA and mammogram.  PET/CT 11/14/23 Similar appearing right breast skin thickening with mild hypermetabolic activity.  No new focal abnormal tracer uptake elsewhere.  Repeat lung bx 2/14/2018 revealed Squamous Cell CA lung PD-L1 10% EGFR NEGALK NEG  Pt treated for advanced squamous cell CA of lung She s/p  cycle 6 of carboplatin/Abraxane Day1 completed 7/2/2018 ( day 15 held due to prolonged cytopenias)  She completed therapy with near CR     PET/CT 2/21/2019 showed  increased size and irregularity of the right axillary lymph node with increased FDG avidity     MAMMO/US rt breast 3/2019  reveals suspicious findings rt axilla LN.  Biopsy of the lymph node measuring 1.4 x 1.1 x 1.3     Pt s/p  rt axillary LN bxPositive for poorly differentiated carcinoma.- likely breast primary ERneg PRneg Her 2 neg    Outside review of specimen(s) revealed  lung tumor corresponds to a squamous cell carcinoma and appears to be unrelated to the invasive ductal carcinoma of the breast. It was determined The axillary mass, in my opinion, corresponded  to metastases of the breast primary. Although it is a poorly differentiated carcinoma, theimmunophenotype is most consistent with the one that the breast primary exhibited, and is inconsistent with a metastatic squamous cell carcinoma.     CT imaging 4/26/2019 persistent rt axillary LAD, no other sites of disease     Lymph node biopsy 3/11/2019 Right axilla, mass, core biopsy:Positive for poorly differentiated carcinoma.favor breast primary     PET/CT 6/20/2019  New and worsening hypermetabolic lymph nodes concerning for metastatic breast cancer     Previously Discussed  imaging findings in detail with patient which reveals new and worsening hypermetabolic melena metabolic lymph nodes including hypermetabolic supraclavicular node.  Therefore, at treatment option will be systemic chemotherapy in light of the recent imaging findings.  She will be followed by her surgical oncologist Dr. Christopher      IT was determined to proceed with systemic chemotherapy   S/p  AC [id number] x 3 wks x 4 wks completed 9/6/2019   ( pt has not received in past)      PET/CT 9/19/2019 shows disease response with interval decrease in size and uptake of several right axillary lymph nodes and a supraclavicular lymph node.  Mild residual activity may indicate viable tumor.No new hypermetabolic findings worrisome for malignancy.    Pt followed by  Breast Surgery and plan was  for  possible   ALND. Pt with Recurrent cancer in her right axilla and right supraclavicular fossa.   She completed chemotherapy 9/6/2019 with near CR  It was determined  Radiation will be given to the original areas of hypermetabolic activity in the right axilla and right supraclavicular region    Pt completed  RT 12/16/2019     PET/CT 1/14/21 shows   New right axillary hypermetabolic lymph nodes with preserved normal architecture suggestive of reactive nodes.  Stable appearing previously identified hypermetabolic lymph node with mild increase in surrounding fat stranding.        Findings previously d/w with treating breast surgeon and it was determined to cont to monitor and close f/u as pt is heavily pre treated, has received RT to site   Pt acknowledged understanding and agreeable with plan     PET/CT imaging  5/20/21 shows Continued increase in both size and hypermetabolic activity of right axillary level 1 lymph nodes a new, mildly hypermetabolic cutaneous thickening of the right breast.     Right breast, skin, punch biopsy:Negative for carcinoma    LYMPH NODE, RIGHT AXILLA, BIOPSY: 6/16/21   - Metastatic poorly-differentiated carcinoma, consistent with breast primary triple neg  PD-L1 immunohistochemistry studies(combined positive score, CPS) is greater than or equal to 10   PET/CT showed  No definite evidence of hypermetabolic tumor.  Interval resolution of hypermetabolic right axillary lymph nodes.  No new hypermetabolic findings.      S/p C16 pembrolizumab 6/15/22   Last  PET/CT imaging shows  new left lower lobe opacification with mild radiotracer uptake which may represent aspiration, pneumonia, or malignancy.  Recent follow-up imaging studies decreasing patchy pulmonary consolidation within the left lower lobe when compared to prior PET-CT scan dated 07/13/2022.  Plan to remain off of therapy   Follow-up PET/CT imaging 1/11/23 Interval resolution of hypermetabolic opacification in the left lower lobe as  compared to FDG PET-CT 07/14/2022.  Interval improvement in patchy opacities in the right lower lobe as compared to CT 11/21/2022   follow up PET imaging [id number] -  performed at [facility] .([employer] mobile was not available at  since broken )No opacity in the left lower lobe on the current exam to correspond to a left lower lobe opacity described on the 7/13/2022 PET CT report.  Images from the 7/13/2022 PET CT are not available at the time of this report.       Follow up PET imaging 3/19/24 shows Similar appearing mild right breast skin thickening with decreased hypermetabolic activity. No new abnormal tracer uptake elsewhere worrisome for malignancy.  follow-up PET imaging 11/12/24 - SHERRILL recurrence  CT chest w/con 3/6/25 shows  Interval development of bilateral subsegmental consolidative opacities and scattered clustered centrilobular pulmonary nodules, likely sequela of a small airways infectious or inflammatory process.   Follow up CT imaging planned 6/2025   Patient clinically stable  Cont to monitor     3. Plan follow up CT chest imaging as recommended per d/w pulmonology    F/b Pulmonology  Pt on supp 02 at night   Cont MDI and O2 as prescribed     4..5. chronic hypoxemic respiratory failure   - chronic, stable    - continue trelegy   - continue supp O2  3-6 L/min with exertion       Follow up with 3 mos with cbc,cmp, mag priro to f/u         Visit today included increased complexity associated with the care of the episodic problem history of lung ca addressed and managing the longitudinal care of the patient due to the serious and/or complex managed problem(s) recurrent breast ca .

## 2025-05-19 ENCOUNTER — ANESTHESIA EVENT (OUTPATIENT)
Dept: ENDOSCOPY | Facility: HOSPITAL | Age: 80
End: 2025-05-19
Payer: MEDICARE

## 2025-05-19 ENCOUNTER — NURSE TRIAGE (OUTPATIENT)
Dept: ADMINISTRATIVE | Facility: CLINIC | Age: 80
End: 2025-05-19
Payer: MEDICARE

## 2025-05-19 NOTE — TELEPHONE ENCOUNTER
Pt reports she is scheduled for an EGD tomorrow. Pt reports she was reading her paperwork and read that she was supposed to be on a full liquid diet since 5/16 but she did not know that until today so she has been eating. Pt reports eating today as well. Pt asking what she should do now for procedure. Care advice to call PCP now. On call MARIO, Dexter Martell, contacted regarding pt. MD verbalized pt will need to reschedule procedure. Will route message to clinic. Pt made aware and verbalizes understanding.    Reason for Disposition   [1] Follow-up call from patient regarding patient's clinical status AND [2] information urgent    Protocols used: PCP Call - No Triage-A-

## 2025-05-19 NOTE — ANESTHESIA PREPROCEDURE EVALUATION
05/19/2025    Pre-operative evaluation for Procedure(s) (LRB):  EGD (ESOPHAGOGASTRODUODENOSCOPY) (N/A)    Ade Castellano is a 79 y.o. female     Patient Active Problem List   Diagnosis    Coronary artery disease of native artery of native heart with stable angina pectoris    Old myocardial infarction    Atherosclerosis of aorta    Prediabetes    DNR (do not resuscitate)    Centrilobular emphysema    Squamous cell carcinoma lung    Metastatic breast cancer    Hypertension    LOGAN (dyspnea on exertion)    Chronic heart failure with preserved ejection fraction    Cancer of overlapping sites of lung    Dependence on supplemental oxygen    Generalized weakness    Chronic respiratory failure with hypoxia    Personal history of chemotherapy    Achalasia    Allergic sinusitis    Peripheral neuropathy due to and not concurrent with chemotherapy    Bronchiolitis       Review of patient's allergies indicates:  No Known Allergies    Medications Ordered Prior to Encounter[1]    Past Surgical History:   Procedure Laterality Date    ADENOIDECTOMY      BREAST BIOPSY Right     x3    BREAST LUMPECTOMY      BREAST SURGERY      lumpectomy right side     CERVIX SURGERY      cone    COLONOSCOPY N/A 3/17/2017    Procedure: COLONOSCOPY;  Surgeon: Juilo Rudd MD;  Location: [facility] ENDO;  Service: Endoscopy;  Laterality: N/A;    COLONOSCOPY N/A 6/30/2017    Procedure: COLONOSCOPY;  Surgeon: Julio Rudd MD;  Location: [facility] ENDO;  Service: Endoscopy;  Laterality: N/A;    COLONOSCOPY N/A 11/28/2018    Procedure: COLONOSCOPY;  Surgeon: Nura Urbina MD;  Location: [facility] ENDO;  Service: Endoscopy;  Laterality: N/A;    COLONOSCOPY N/A 1/29/2019    Procedure: COLONOSCOPY;  Surgeon: Emmanuel Perez MD;  Location: [facility] ENDO;  Service: Endoscopy;  Laterality: N/A;  confirmed appt-SP    COLONOSCOPY N/A 7/26/2022    Procedure: COLONOSCOPY;   Surgeon: James Healy MD;  Location: [facility] ENDO;  Service: Endoscopy;  Laterality: N/A;  fully vaccinated -  mediport in chest -     CORONARY ANGIOPLASTY WITH STENT PLACEMENT      ESOPHAGEAL MANOMETRY WITH MEASUREMENT OF IMPEDANCE N/A 7/10/2023    Procedure: MANOMETRY, ESOPHAGUS, WITH IMPEDANCE MEASUREMENT;  Surgeon: Miller Phillips MD;  Location: [facility] (4TH FLR);  Service: Gastroenterology;  Laterality: N/A;  pt on oxygen 2.5L  pt requested Tuesday only  5/31 referred by Dr. Miller Phillips/instr. mailed-st  7/3-r/s, updated instructions reviewed with pt in detail via phone, pt verbalized understanding-[facility] [place]    ESOPHAGOGASTRODUODENOSCOPY N/A 1/25/2021    Procedure: EGD (ESOPHAGOGASTRODUODENOSCOPY);  Surgeon: Dmitry Gonzalez MD;  Location: [facility] ENDO;  Service: Endoscopy;  Laterality: N/A;  rapid test >50 miles -ml    ESOPHAGOGASTRODUODENOSCOPY N/A 11/8/2022    Procedure: EGD (ESOPHAGOGASTRODUODENOSCOPY);  Surgeon: Tapan Stevenson MD;  Location: [facility] ENDO;  Service: Endoscopy;  Laterality: N/A;  w/dilation  fully vaccinated, instructions mailed-\A [facility] [place] [facility]\""  11/4 pt called did not receive instructions, does not have portal or email, went over prep instructions and medication instructions with pt on the phone -LW    ESOPHAGOGASTRODUODENOSCOPY N/A 9/19/2023    Procedure: EGD (ESOPHAGOGASTRODUODENOSCOPY);  Surgeon: Miller Phillips MD;  Location: [facility] ENDO;  Service: Endoscopy;  Laterality: N/A;  botox injection  inst mailed    EYE SURGERY Bilateral 06/08/2018    cataract     LEFT HEART CATHETERIZATION Left 8/13/2020    Procedure: Left heart cath, rra, noon;  Surgeon: Guevara Skelton MD;  Location: [facility] CATH LAB;  Service: Cardiology;  Laterality: Left;  RN PREOP 8/7/2020---COVID NEGATIVE ON 8/12    PORTACATH PLACEMENT Right 01/2018    sweat glands axillary regions Bilateral     TONSILLECTOMY         Social History[2]        Pre-op Assessment    I have reviewed the Patient Summary Reports.     I have reviewed the  Nursing Notes. I have reviewed the NPO Status.   I have reviewed the Medications.     Review of Systems  Anesthesia Hx:  No problems with previous Anesthesia             Denies Family Hx of Anesthesia complications.    Denies Personal Hx of Anesthesia complications.                    Social:  Former Smoker, No Alcohol Use       Hematology/Oncology:                        --  Cancer in past history:       Breast          Oncology Comments: Squamous cell carcinoma of lungs     Cardiovascular:     Hypertension Valvular problems/Murmurs, MR Past MI CAD   CABG/stent   Angina      LOGAN   06/2023 Echo:  ? The left ventricle is normal in size with normal systolic function.  ? The estimated ejection fraction is 55%.  ? Mild left atrial enlargement.  ? Grade III left ventricular diastolic dysfunction.  ? Normal right ventricular size with normal right ventricular systolic function.  ? Mild right atrial enlargement.  ? Moderate mitral regurgitation.  ? Normal central venous pressure (3 mmHg).  ? The estimated PA systolic pressure is 40 mmHg.  ? There is moderate pulmonary hypertension                             Pulmonary:  Pneumonia COPD   Shortness of breath  Sleep Apnea Uses oxygen prn; last used albuterol 2 days ago               Renal/:  Renal/ Normal                 Hepatic/GI:   PUD,                  Neurological:    Neuromuscular Disease,                                   Endocrine:  Endocrine Normal                Physical Exam  General: Well nourished    Airway:  Mallampati: III   Mouth Opening: Normal  TM Distance: Normal  Tongue: Normal  Neck ROM: Normal ROM    Dental:  Partial Dentures  Partial upper and lower      Anesthesia Plan  Type of Anesthesia, risks & benefits discussed:    Anesthesia Type: Gen Natural Airway, Gen ETT  Intra-op Monitoring Plan: Standard ASA Monitors  Induction:  IV  Informed Consent: Informed consent signed with the Patient and all parties understand the risks and agree with anesthesia  plan.  All questions answered. Patient consented to blood products? No  ASA Score: 3    Ready For Surgery From Anesthesia Perspective.     .           [1]   No current facility-administered medications on file prior to encounter.     Current Outpatient Medications on File Prior to Encounter   Medication Sig Dispense Refill    acetaminophen (TYLENOL) 650 MG TbSR Take 650 mg by mouth every 8 (eight) hours.      albuterol (PROVENTIL) 2.5 mg /3 mL (0.083 %) nebulizer solution NEBULIZE 1 vial EVERY 6 HOURS AS NEEDED FOR WHEEZING 540 mL 1    albuterol (VENTOLIN HFA) 90 mcg/actuation inhaler Inhale 2 puffs into the lungs every 6 (six) hours as needed for Wheezing. Rescue      aspirin (ECOTRIN) 81 MG EC tablet Take 81 mg by mouth once daily.       azelastine (ASTELIN) 137 mcg (0.1 %) nasal spray       diclofenac (VOLTAREN) 50 MG EC tablet TAKE 1 TABLET BY MOUTH TWICE DAILY AS NEEDED 45 tablet 2    EScitalopram oxalate (LEXAPRO) 10 MG tablet Take 1 tablet (10 mg total) by mouth once daily. 90 tablet 3    fluticasone propionate (FLONASE) 50 mcg/actuation nasal spray SHAKE WELL & USE TWO SPRAYS EACH NOSTRIL ONCE A DAY 48 g 3    fluticasone-umeclidin-vilanter (TRELEGY ELLIPTA) 200-62.5-25 mcg inhaler Inhale 1 puff into the lungs once daily. 60 each 11    isosorbide mononitrate (IMDUR) 30 MG 24 hr tablet TAKE 1 TABLET BY MOUTH EVERY DAY. Hold if SBP <120 90 tablet 3    levocetirizine (XYZAL) 5 MG tablet TAKE 1 TABLET BY MOUTH EVERY DAY IN THE EVENING FOR ALLERGIES 90 tablet 1    methocarbamoL (ROBAXIN) 500 MG Tab Take 1 tablet (500 mg total) by mouth 2 (two) times daily as needed (back spasm). 30 tablet 0    montelukast (SINGULAIR) 10 mg tablet       nitroGLYCERIN (NITROSTAT) 0.4 MG SL tablet PLACE 1 TAB UNDER TONGUE EVERY 5 MINUTES x 3 DOSES AS NEEDED FOR CHEST PAIN. IF NOT RESOLVED CALL 911 25 tablet 11    pantoprazole (PROTONIX) 40 MG tablet Take 1 tablet (40 mg total) by mouth once daily. 90 tablet 3    pregabalin (LYRICA)  75 MG capsule Take 75 mg by mouth 2 (two) times daily.      rosuvastatin (CRESTOR) 20 MG tablet Take 1 tablet (20 mg total) by mouth once daily. 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     Drug use: No    Sexual activity: Not Currently     Social Drivers of Health     Financial Resource Strain: Low Risk  (3/6/2025)    Overall Financial Resource Strain (CARDIA)     Difficulty of Paying Living Expenses: Not hard at all   Food Insecurity: No Food Insecurity (3/6/2025)    Hunger Vital Sign     Worried About Running Out of Food in the Last Year: Never true     Ran Out of Food in the Last Year: Never true   Transportation Needs: No Transportation Needs (3/6/2025)    PRAPARE - Transportation     Lack of Transportation (Medical): No     Lack of Transportation (Non-Medical): No   Physical Activity: Inactive (3/6/2025)    Exercise Vital Sign     Days of Exercise per Week: 0 days     Minutes of Exercise per Session: 0 min   Stress: No Stress Concern Present (3/6/2025)    [employer] [place] [employer] - Occupational Stress Questionnaire     Feeling of Stress : Only a little   Housing Stability: Low Risk  (3/6/2025)    Housing Stability Vital Sign     Unable to Pay for Housing in the Last Year: No     Homeless in the Last Year: No

## 2025-05-20 ENCOUNTER — ANESTHESIA (OUTPATIENT)
Dept: ENDOSCOPY | Facility: HOSPITAL | Age: 80
End: 2025-05-20
Payer: MEDICARE

## 2025-05-20 DIAGNOSIS — K21.00 GASTROESOPHAGEAL REFLUX DISEASE WITH ESOPHAGITIS, UNSPECIFIED WHETHER HEMORRHAGE: ICD-10-CM

## 2025-05-20 RX ORDER — PANTOPRAZOLE SODIUM 40 MG/1
40 TABLET, DELAYED RELEASE ORAL DAILY
Qty: 90 TABLET | Refills: 3 | Status: SHIPPED | OUTPATIENT
Start: 2025-05-20 | End: 2026-05-20

## 2025-05-22 ENCOUNTER — TELEPHONE (OUTPATIENT)
Dept: SLEEP MEDICINE | Facility: HOSPITAL | Age: 80
End: 2025-05-22
Payer: MEDICARE

## 2025-05-27 ENCOUNTER — HOSPITAL ENCOUNTER (OUTPATIENT)
Facility: HOSPITAL | Age: 80
Discharge: HOME | End: 2025-05-27
Attending: STUDENT IN AN ORGANIZED HEALTH CARE EDUCATION/TRAINING PROGRAM | Admitting: STUDENT IN AN ORGANIZED HEALTH CARE EDUCATION/TRAINING PROGRAM
Payer: MEDICARE

## 2025-05-27 VITALS
HEART RATE: 61 BPM | OXYGEN SATURATION: 96 % | TEMPERATURE: 96 F | RESPIRATION RATE: 17 BRPM | DIASTOLIC BLOOD PRESSURE: 67 MMHG | SYSTOLIC BLOOD PRESSURE: 138 MMHG

## 2025-05-27 DIAGNOSIS — K22.0 ESOPHAGEAL ACHALASIA: Primary | ICD-10-CM

## 2025-05-27 DIAGNOSIS — K22.0 ACHALASIA: ICD-10-CM

## 2025-05-27 PROCEDURE — 88305 TISSUE EXAM BY PATHOLOGIST: CPT | Mod: TC,HCNC | Performed by: STUDENT IN AN ORGANIZED HEALTH CARE EDUCATION/TRAINING PROGRAM

## 2025-05-27 PROCEDURE — 63600175 PHARM REV CODE 636 W HCPCS: Mod: JZ,TB,HCNC | Performed by: STUDENT IN AN ORGANIZED HEALTH CARE EDUCATION/TRAINING PROGRAM

## 2025-05-27 PROCEDURE — 88305 TISSUE EXAM BY PATHOLOGIST: CPT | Mod: 26,HCNC,, | Performed by: PATHOLOGY

## 2025-05-27 PROCEDURE — 25000003 PHARM REV CODE 250: Mod: HCNC | Performed by: STUDENT IN AN ORGANIZED HEALTH CARE EDUCATION/TRAINING PROGRAM

## 2025-05-27 PROCEDURE — 43239 EGD BIOPSY SINGLE/MULTIPLE: CPT | Mod: HCNC | Performed by: STUDENT IN AN ORGANIZED HEALTH CARE EDUCATION/TRAINING PROGRAM

## 2025-05-27 PROCEDURE — 43236 UPPR GI SCOPE W/SUBMUC INJ: CPT | Mod: XS,HCNC,, | Performed by: STUDENT IN AN ORGANIZED HEALTH CARE EDUCATION/TRAINING PROGRAM

## 2025-05-27 PROCEDURE — 43239 EGD BIOPSY SINGLE/MULTIPLE: CPT | Mod: HCNC,,, | Performed by: STUDENT IN AN ORGANIZED HEALTH CARE EDUCATION/TRAINING PROGRAM

## 2025-05-27 PROCEDURE — 63600175 PHARM REV CODE 636 W HCPCS: Mod: HCNC | Performed by: STUDENT IN AN ORGANIZED HEALTH CARE EDUCATION/TRAINING PROGRAM

## 2025-05-27 PROCEDURE — 37000008 HC ANESTHESIA 1ST 15 MINUTES: Mod: HCNC | Performed by: STUDENT IN AN ORGANIZED HEALTH CARE EDUCATION/TRAINING PROGRAM

## 2025-05-27 PROCEDURE — 27201028 HC NEEDLE, SCLERO: Mod: HCNC | Performed by: STUDENT IN AN ORGANIZED HEALTH CARE EDUCATION/TRAINING PROGRAM

## 2025-05-27 PROCEDURE — 43236 UPPR GI SCOPE W/SUBMUC INJ: CPT | Mod: HCNC | Performed by: STUDENT IN AN ORGANIZED HEALTH CARE EDUCATION/TRAINING PROGRAM

## 2025-05-27 PROCEDURE — 37000009 HC ANESTHESIA EA ADD 15 MINS: Mod: HCNC | Performed by: STUDENT IN AN ORGANIZED HEALTH CARE EDUCATION/TRAINING PROGRAM

## 2025-05-27 RX ORDER — LIDOCAINE HYDROCHLORIDE 20 MG/ML
INJECTION, SOLUTION EPIDURAL; INFILTRATION; INTRACAUDAL; PERINEURAL
Status: DISCONTINUED
Start: 2025-05-27 | End: 2025-05-27 | Stop reason: HOSPADM

## 2025-05-27 RX ORDER — LIDOCAINE HYDROCHLORIDE 20 MG/ML
INJECTION INTRAVENOUS
Status: DISCONTINUED | OUTPATIENT
Start: 2025-05-27 | End: 2025-05-27

## 2025-05-27 RX ORDER — PROPOFOL 10 MG/ML
VIAL (ML) INTRAVENOUS
Status: DISCONTINUED | OUTPATIENT
Start: 2025-05-27 | End: 2025-05-27

## 2025-05-27 RX ORDER — PROPOFOL 10 MG/ML
VIAL (ML) INTRAVENOUS
Status: DISCONTINUED
Start: 2025-05-27 | End: 2025-05-27 | Stop reason: HOSPADM

## 2025-05-27 RX ORDER — SODIUM CHLORIDE 9 MG/ML
INJECTION, SOLUTION INTRAVENOUS CONTINUOUS
Status: DISCONTINUED | OUTPATIENT
Start: 2025-05-27 | End: 2025-05-27 | Stop reason: HOSPADM

## 2025-05-27 RX ADMIN — LIDOCAINE HYDROCHLORIDE 120 MG: 20 INJECTION, SOLUTION INTRAVENOUS at 08:05

## 2025-05-27 RX ADMIN — ONABOTULINUMTOXINA 100 UNITS: 100 INJECTION, POWDER, LYOPHILIZED, FOR SOLUTION INTRADERMAL; INTRAMUSCULAR at 09:05

## 2025-05-27 RX ADMIN — PROPOFOL 30 MG: 10 INJECTION, EMULSION INTRAVENOUS at 08:05

## 2025-05-27 RX ADMIN — PROPOFOL 50 MG: 10 INJECTION, EMULSION INTRAVENOUS at 09:05

## 2025-05-27 RX ADMIN — PROPOFOL 20 MG: 10 INJECTION, EMULSION INTRAVENOUS at 08:05

## 2025-05-27 RX ADMIN — PROPOFOL 80 MG: 10 INJECTION, EMULSION INTRAVENOUS at 08:05

## 2025-05-27 RX ADMIN — SODIUM CHLORIDE: 0.9 INJECTION, SOLUTION INTRAVENOUS at 08:05

## 2025-05-27 NOTE — ANESTHESIA POSTPROCEDURE EVALUATION
Anesthesia Post Evaluation    Patient: Ade Castellano    Procedure(s) Performed: Procedure(s) (LRB):  EGD (ESOPHAGOGASTRODUODENOSCOPY) (N/A)    Final Anesthesia Type: general      Patient location during evaluation: GI PACU  Patient participation: Yes- Able to Participate  Level of consciousness: awake and alert  Post-procedure vital signs: reviewed and stable  Airway patency: patent    PONV status at discharge: No PONV  Anesthetic complications: no      Cardiovascular status: blood pressure returned to baseline and hemodynamically stable  Respiratory status: unassisted, spontaneous ventilation and room air  Hydration status: euvolemic  Follow-up not needed.              Vitals Value Taken Time   /77 05/27/25 09:22   Temp 35.8 °C (96.4 °F) 05/27/25 09:09   Pulse 59 05/27/25 09:22   Resp 16 05/27/25 09:22   SpO2 97 % 05/27/25 09:22         No case tracking events are documented in the log.      Pain/Sergio Score: Sergio Score: 10 (5/27/2025  9:09 AM)

## 2025-05-27 NOTE — TRANSFER OF CARE
Anesthesia Transfer of Care Note    Patient: Ade Castellano    Procedure(s) Performed: Procedure(s) (LRB):  EGD (ESOPHAGOGASTRODUODENOSCOPY) (N/A)    Patient location: GI    Anesthesia Type: general    Transport from OR: Transported from OR on room air with adequate spontaneous ventilation    Post pain: adequate analgesia    Post assessment: no apparent anesthetic complications    Post vital signs: stable    Level of consciousness: responds to stimulation    Nausea/Vomiting: no nausea/vomiting    Complications: none    Transfer of care protocol was followed      Last vitals: Visit Vitals  /70 (BP Location: Right arm, Patient Position: Lying)   Pulse 80   Temp 35.8 °C (96.4 °F) (Oral)   Resp 18   LMP  (LMP Unknown)   SpO2 95%   Breastfeeding No

## 2025-05-27 NOTE — PLAN OF CARE
Procedure and recovery complete. Awake and alert. Vss, denies complains of pain, niece at bedside. Dr. Cardoza discussed procedure findings with patient. Ambulates without assistance, gait steady. Discharge criteria meet.

## 2025-05-27 NOTE — [employer]ATION PATIENT INSTRUCTIONS
Discharge Summary/Instructions after an Endoscopic Procedure  Patient Name: Ade Castellano  Patient MRN: [id number]  Patient YOB: 1945  Tuesday, May 27, 2025  Nahum Cardoza MD  Dear patient,  As a result of recent federal legislation (The Federal Cures Act), you may   receive lab or pathology results from your procedure in your [facility]   account before your physician is able to contact you. Your physician or   their representative will relay the results to you with their   recommendations at their soonest availability.  Thank you,  RESTRICTIONS:  During your procedure today, you received medications for sedation.  These   medications may affect your judgment, balance and coordination.  Therefore,   for 24 hours, you have the following restrictions:   - DO NOT drive a car, operate machinery, make legal/financial decisions,   sign important papers or drink alcohol.    ACTIVITY:  Today: no heavy lifting, straining or running due to procedural   sedation/anesthesia.  The following day: return to full activity including work.  DIET:  Eat and drink normally unless instructed otherwise.     TREATMENT FOR COMMON SIDE EFFECTS:  - Mild abdominal pain, nausea, belching, bloating or excessive gas:  rest,   eat lightly and use a heating pad.  - Sore Throat: treat with throat lozenges and/or gargle with warm salt   water.  - Because air was used during the procedure, expelling large amounts of air   from your rectum or belching is normal.  - If a bowel prep was taken, you may not have a bowel movement for 1-3 days.    This is normal.  SYMPTOMS TO WATCH FOR AND REPORT TO YOUR PHYSICIAN:  1. Abdominal pain or bloating, other than gas cramps.  2. Chest pain.  3. Back pain.  4. Signs of infection such as: chills or fever occurring within 24 hours   after the procedure.  5. Rectal bleeding, which would show as bright red, maroon, or black stools.   (A tablespoon of blood from the rectum is not serious, especially if    hemorrhoids are present.)  6. Vomiting.  7. Weakness or dizziness.  GO DIRECTLY TO THE NEAREST EMERGENCY ROOM IF YOU HAVE ANY OF THE FOLLOWING:      Difficulty breathing              Chills and/or fever over 101 F   Persistent vomiting and/or vomiting blood   Severe abdominal pain   Severe chest pain   Black, tarry stools   Bleeding- more than one tablespoon   Any other symptom or condition that you feel may need urgent attention  Your doctor recommends these additional instructions:  If any biopsies were taken, your doctors clinic will contact you in 1 to 2   weeks with any results.  - Discharge patient to home (with escort).   - Await pathology results.   - Patient has a contact number available for emergencies.  The signs and   symptoms of potential delayed complications were discussed with the   patient.  Return to normal activities tomorrow.  Written discharge   instructions were provided to the patient.  For questions, problems or results please call your physician - Nahum Cardoza MD at Work:  [phone].  [facility] can be reached at [phone]     IF A COMPLICATION OR EMERGENCY SITUATION ARISES AND YOU ARE UNABLE TO REACH   YOUR PHYSICIAN - GO DIRECTLY TO THE EMERGENCY ROOM.  MD Nahum Buckner MD  5/27/2025 9:21:29 AM  This report has been verified and signed electronically.  Dear patient,  As a result of recent federal legislation (The Federal Cures Act), you may   receive lab or pathology results from your procedure in your [facility]   account before your physician is able to contact you. Your physician or   their representative will relay the results to you with their   recommendations at their soonest availability.  Thank you,  [employer]ATION

## 2025-05-27 NOTE — H&P
Short Stay Endoscopy History and Physical    PCP - Jeannine Huitron MD     Procedure - EGD  ASA - per anesthesia  Mallampati - per anesthesia  History of Anesthesia problems - no  Family history Anesthesia problems -  no   Plan of anesthesia - General    HPI:  This is a 79 y.o. female here for evaluation of :     Achalasia, EGD for evaluation of potential botox injections of the LES      Medical History:  has a past medical history of Abnormal stress test (8/5/2020), Acute respiratory failure with hypoxia and hypercapnia (11/29/2017), Angina pectoris, Arthritis, Bell's palsy, Breast cancer, CAD (coronary artery disease), Cervical cancer, Chronic bronchitis, Chronic heart failure with preserved ejection fraction (8/5/2020), Chronic heart failure with preserved ejection fraction (8/5/2020), COPD (chronic obstructive pulmonary disease), Dental bridge present, Emphysema of lung, H/O colonoscopy (06/2017), History of Bell's palsy, History of heart artery stent, Hyperlipidemia, Hypertension, Lung cancer, Myocardial infarction, SUNITHA (obstructive sleep apnea), SUNITHA (obstructive sleep apnea), Pneumonia, Pneumonia due to other staphylococcus, PUD (peptic ulcer disease), Severe anemia (6/11/2018), Sleep apnea, and Vaginal delivery.    Surgical History:  has a past surgical history that includes Cervix surgery; Breast surgery; Tonsillectomy; Adenoidectomy; sweat glands axillary regions (Bilateral); Colonoscopy (N/A, 3/17/2017); Breast biopsy (Right); Colonoscopy (N/A, 6/30/2017); Portacath placement (Right, 01/2018); Colonoscopy (N/A, 11/28/2018); Colonoscopy (N/A, 1/29/2019); Eye surgery (Bilateral, 06/08/2018); Breast lumpectomy; Left heart catheterization (Left, 8/13/2020); Esophagogastroduodenoscopy (N/A, 1/25/2021); Coronary angioplasty with stent; Colonoscopy (N/A, 7/26/2022); Esophagogastroduodenoscopy (N/A, 11/8/2022); Esophageal manometry with measurement of impedance (N/A, 7/10/2023); and  Esophagogastroduodenoscopy (N/A, 9/19/2023).    Family History: family history includes Breast cancer in her mother; COPD in her sister; Cancer in her father, maternal grandfather, maternal grandmother, mother, paternal grandfather, paternal grandmother, sister, and sister; Heart attack in her sister; Heart disease in her maternal grandfather, maternal grandmother, mother, and sister; Kidney cancer in her son.    Social History:  reports that she quit smoking about 7 years ago. Her smoking use included cigarettes. She started smoking about 57 years ago. She has a 12.5 pack-year smoking history. She has been exposed to tobacco smoke. She has quit using smokeless tobacco. She reports that she does not drink alcohol and does not use drugs.    Review of patient's allergies indicates:  No Known Allergies    Medications:   Prescriptions Prior to Admission[1]    Physical Exam:    Vital Signs:   Vitals:    05/27/25 0742   BP: (!) 151/73   Pulse: 76   Resp: 20   Temp: 98.1 °F (36.7 °C)       General Appearance: Well appearing in no acute distress  Head: Normocephalic, without obvious abnormality   Lungs: Non-labored breathing  Abdomen: Soft, non tender, non distended     Labs:  Lab Results   Component Value Date    WBC 4.66 05/14/2025    HGB 13.1 05/14/2025    HCT 41.3 05/14/2025     05/14/2025    CHOL 156 05/21/2024    TRIG 178 (H) 05/21/2024    HDL 58 05/21/2024    ALT 14 05/14/2025    AST 17 05/14/2025     05/14/2025    K 3.7 05/14/2025     05/14/2025    CREATININE 0.8 05/14/2025    BUN 14 05/14/2025    CO2 25 05/14/2025    TSH 2.900 11/20/2024    INR 1.0 05/22/2024    HGBA1C 5.9 (H) 11/20/2024       I have explained the risks and benefits of endoscopy procedures to the patient including but not limited to bleeding, perforation, infection, and death.  The patient was asked if they understand and allowed to ask any further questions to their satisfaction.      Nahum Cardoza MD        [1]   Medications  Prior to Admission   Medication Sig Dispense Refill Last Dose/Taking    aspirin (ECOTRIN) 81 MG EC tablet Take 81 mg by mouth once daily.    5/26/2025    clopidogreL (PLAVIX) 75 mg tablet Take 1 tablet (75 mg total) by mouth once daily. 90 tablet 3 Past Month    rosuvastatin (CRESTOR) 20 MG tablet Take 1 tablet (20 mg total) by mouth once daily. 90 tablet 3 5/26/2025    acetaminophen (TYLENOL) 650 MG TbSR Take 650 mg by mouth every 8 (eight) hours.       albuterol (PROVENTIL) 2.5 mg /3 mL (0.083 %) nebulizer solution NEBULIZE 1 vial EVERY 6 HOURS AS NEEDED FOR WHEEZING 540 mL 1     albuterol (VENTOLIN HFA) 90 mcg/actuation inhaler Inhale 2 puffs into the lungs every 6 (six) hours as needed for Wheezing. Rescue       azelastine (ASTELIN) 137 mcg (0.1 %) nasal spray        diclofenac (VOLTAREN) 50 MG EC tablet TAKE 1 TABLET BY MOUTH TWICE DAILY AS NEEDED 45 tablet 2     EScitalopram oxalate (LEXAPRO) 10 MG tablet Take 1 tablet (10 mg total) by mouth once daily. 90 tablet 3     fluticasone propionate (FLONASE) 50 mcg/actuation nasal spray SHAKE WELL & USE TWO SPRAYS EACH NOSTRIL ONCE A DAY 48 g 3     fluticasone-umeclidin-vilanter (TRELEGY ELLIPTA) 200-62.5-25 mcg inhaler Inhale 1 puff into the lungs once daily. 60 each 11     isosorbide mononitrate (IMDUR) 30 MG 24 hr tablet TAKE 1 TABLET BY MOUTH EVERY DAY. Hold if SBP <120 90 tablet 3     levocetirizine (XYZAL) 5 MG tablet TAKE 1 TABLET BY MOUTH EVERY DAY IN THE EVENING FOR ALLERGIES 90 tablet 1     methocarbamoL (ROBAXIN) 500 MG Tab Take 1 tablet (500 mg total) by mouth 2 (two) times daily as needed (back spasm). 30 tablet 0     montelukast (SINGULAIR) 10 mg tablet        nitroGLYCERIN (NITROSTAT) 0.4 MG SL tablet PLACE 1 TAB UNDER TONGUE EVERY 5 MINUTES x 3 DOSES AS NEEDED FOR CHEST PAIN. IF NOT RESOLVED CALL 911 25 tablet 11     pantoprazole (PROTONIX) 40 MG tablet Take 1 tablet (40 mg total) by mouth once daily. 90 tablet 3     pregabalin (LYRICA) 75 MG capsule  Take 75 mg by mouth 2 (two) times daily.

## 2025-05-28 ENCOUNTER — RESULTS FOLLOW-UP (OUTPATIENT)
Dept: GASTROENTEROLOGY | Facility: HOSPITAL | Age: 80
End: 2025-05-28

## 2025-05-28 LAB
ESTROGEN SERPL-MCNC: NORMAL PG/ML
INSULIN SERPL-ACNC: NORMAL U[IU]/ML
LAB AP CLINICAL INFORMATION: NORMAL
LAB AP GROSS DESCRIPTION: NORMAL
LAB AP PERFORMING LOCATION(S): NORMAL
LAB AP REPORT FOOTNOTES: NORMAL

## 2025-06-02 ENCOUNTER — HOSPITAL ENCOUNTER (OUTPATIENT)
Dept: RADIOLOGY | Facility: HOSPITAL | Age: 80
Discharge: HOME | End: 2025-06-02
Attending: INTERNAL MEDICINE
Payer: MEDICARE

## 2025-06-02 DIAGNOSIS — J21.9 BRONCHIOLITIS: ICD-10-CM

## 2025-06-02 PROCEDURE — 71250 CT THORAX DX C-: CPT | Mod: TC

## 2025-06-02 PROCEDURE — 71250 CT THORAX DX C-: CPT | Mod: 26,,, | Performed by: STUDENT IN AN ORGANIZED HEALTH CARE EDUCATION/TRAINING PROGRAM

## 2025-06-09 ENCOUNTER — OFFICE VISIT (OUTPATIENT)
Dept: PULMONOLOGY | Facility: CLINIC | Age: 80
End: 2025-06-09
Payer: MEDICARE

## 2025-06-09 VITALS
HEART RATE: 86 BPM | BODY MASS INDEX: 26.71 KG/M2 | WEIGHT: 146.06 LBS | SYSTOLIC BLOOD PRESSURE: 110 MMHG | DIASTOLIC BLOOD PRESSURE: 71 MMHG

## 2025-06-09 DIAGNOSIS — R13.12 OROPHARYNGEAL DYSPHAGIA: ICD-10-CM

## 2025-06-09 DIAGNOSIS — J21.9 BRONCHIOLITIS: ICD-10-CM

## 2025-06-09 DIAGNOSIS — G47.33 OSA (OBSTRUCTIVE SLEEP APNEA): ICD-10-CM

## 2025-06-09 DIAGNOSIS — J96.11 CHRONIC RESPIRATORY FAILURE WITH HYPOXIA: Primary | ICD-10-CM

## 2025-06-09 DIAGNOSIS — J44.9 CHRONIC OBSTRUCTIVE PULMONARY DISEASE, UNSPECIFIED COPD TYPE: ICD-10-CM

## 2025-06-09 PROCEDURE — 3078F DIAST BP <80 MM HG: CPT | Mod: CPTII,HCNC,S$GLB, | Performed by: INTERNAL MEDICINE

## 2025-06-09 PROCEDURE — 1101F PT FALLS ASSESS-DOCD LE1/YR: CPT | Mod: CPTII,HCNC,S$GLB, | Performed by: INTERNAL MEDICINE

## 2025-06-09 PROCEDURE — 1157F ADVNC CARE PLAN IN RCRD: CPT | Mod: CPTII,HCNC,S$GLB, | Performed by: INTERNAL MEDICINE

## 2025-06-09 PROCEDURE — 1159F MED LIST DOCD IN RCRD: CPT | Mod: CPTII,HCNC,S$GLB, | Performed by: INTERNAL MEDICINE

## 2025-06-09 PROCEDURE — 99214 OFFICE O/P EST MOD 30 MIN: CPT | Mod: HCNC,S$GLB,, | Performed by: INTERNAL MEDICINE

## 2025-06-09 PROCEDURE — 3074F SYST BP LT 130 MM HG: CPT | Mod: CPTII,HCNC,S$GLB, | Performed by: INTERNAL MEDICINE

## 2025-06-09 PROCEDURE — 3288F FALL RISK ASSESSMENT DOCD: CPT | Mod: CPTII,HCNC,S$GLB, | Performed by: INTERNAL MEDICINE

## 2025-06-09 PROCEDURE — 99999 PR PBB SHADOW E&M-EST. PATIENT-LVL IV: CPT | Mod: PBBFAC,HCNC,, | Performed by: INTERNAL MEDICINE

## 2025-06-09 RX ORDER — ALBUTEROL SULFATE 90 UG/1
2 INHALANT RESPIRATORY (INHALATION) EVERY 6 HOURS PRN
Qty: 18 G | Refills: 11 | Status: SHIPPED | OUTPATIENT
Start: 2025-06-09

## 2025-06-09 RX ORDER — ALBUTEROL SULFATE 0.83 MG/ML
2.5 SOLUTION RESPIRATORY (INHALATION) EVERY 6 HOURS PRN
Qty: 540 ML | Refills: 11 | Status: SHIPPED | OUTPATIENT
Start: 2025-06-09

## 2025-06-09 RX ORDER — FLUTICASONE FUROATE, UMECLIDINIUM BROMIDE AND VILANTEROL TRIFENATATE 200; 62.5; 25 UG/1; UG/1; UG/1
1 POWDER RESPIRATORY (INHALATION) DAILY
Qty: 60 EACH | Refills: 11 | Status: SHIPPED | OUTPATIENT
Start: 2025-06-09 | End: 2026-06-09

## 2025-06-09 NOTE — PROGRESS NOTES
General Pulmonary Clinic  Follow Up Patient Visit      Chief Complaint: COPD     HPI     Ade Castellano is a 79 y.o. female with a history of  COPD, breast/lung cancer, small vessel, CAD, SUNITHA, chronic diastolic HF (grade 1), mild-moderate AS, achalasia presenting with chief complaint of chronic hypoxemic respiratory failure 2/2 copd    Interval hx  CT chest 6/1/25 personally interpreted lung portion only  Stable RML band like scarring/atelectasis,  Improvement overall in multi focal consolidative opacities  Continued scattered centrilobular nodules w/ bronchiloitis and GGO   New RUL consolidative opacity with surrounding GGO, similar but small consolidative opacity in RLL    Concerns for ongoing achalasia w/ regurgitation/aspiration contributing CT chest findings  EGD procedure nore reviewed from 5/27/25 -- achalasia noted, injected w/ botox. Antrum biopsied  Path 5/27/25 reviewed w/ chronic gastritis  Agreeable to PSG ordered --   now declines doing sleep study again      She is using trelegy daily and has not required albuterol  She is not wearing oxygen on exertion again, states she does not need it.  Declines pulmonary rehab does not want to drive to [facility]      Presenting HPI  # COPD    Uable to quantify exercise tolerance -- mmRC 3, completes all ADLs  Has chronic productive cough w/ clear/white phlegm  # of exacerbations per year 0, requiring hospitalization - 2017 had post obstructive PNA requiring intubation  Current regimen: trelegy, ventolin 1-2 puff per day, rx oxygen 2.5 L/min with exertion  She has daytime sleepiness, snoring -- had sleep study that was consistent w/ SUNITHA but was unable to tolerate mask fitting  Denies GERD but does have hx of achalasia with reg  wing is at the level of her neck/back of mouth  Participated in pulmonary rehab yes [] no[x]  Historical eosinophilia yes [] no[x]   Known triggers yes [] no[x]   Childhood asthma yes [] no[x]  Previously seen by Dr. Katz at  "[facility] -- pfts " severe obstructive FEV1 30-49% predicted) w gas trapping and moderately reduced DLCO 40-59%  Former smoker 2 ppd x 60 years, 2017  Exposures: worked in construction, security [employer]      Records reviewed with the following findings ---  Hematology-oncology noted 11/20/24 personally reviewed  - hx of stage 1A R breast cancer s/p lumpectomy/XRT 2014  - stage IV SCC R lung 2/14/208 s/p carboplatin/abraxane 2018 x 6 cycles  - Recurrent breast cancer R pbreast w/ R axillary LN mets - placed on AC x 4 cycles, XRT to axilla -> pembrolizumab [id number]  - 9/2024 PET CT w/ no signs of active disease    CT chest 5/2024 personally interpreted no suspicious pulmonary nodules  Objective   Past History     Past Medical History:  Past Medical History:   Diagnosis Date    Abnormal stress test 8/5/2020    Acute respiratory failure with hypoxia and hypercapnia 11/29/2017    Angina pectoris     Arthritis     Bell's palsy     left facial weakness    Breast cancer     RIGHT    CAD (coronary artery disease)     Cervical cancer     Chronic bronchitis     Chronic heart failure with preserved ejection fraction 8/5/2020    Chronic heart failure with preserved ejection fraction 8/5/2020    COPD (chronic obstructive pulmonary disease)     Dr. Katz    Dental bridge present     Emphysema of lung     H/O colonoscopy 06/2017    due for repeat colonsocopy in 6/2018    History of Bell's palsy     History of heart artery stent     Dr. Ortiz  x2 stents    Hyperlipidemia     Hypertension     Lung cancer     Myocardial infarction     SUNITHA (obstructive sleep apnea)     intolerant to mask    SUNITHA (obstructive sleep apnea)     intolerant to mask     Pneumonia     Pneumonia due to other staphylococcus     PUD (peptic ulcer disease)     Severe anemia 6/11/2018    Sleep apnea     Vaginal delivery     x1         Past Surgical History:  Past Surgical History:   Procedure Laterality Date    ADENOIDECTOMY      BREAST BIOPSY Right     x3    " BREAST LUMPECTOMY      BREAST SURGERY      lumpectomy right side     CERVIX SURGERY      cone    COLONOSCOPY N/A 3/17/2017    Procedure: COLONOSCOPY;  Surgeon: Julio Rudd MD;  Location: [facility] ENDO;  Service: Endoscopy;  Laterality: N/A;    COLONOSCOPY N/A 6/30/2017    Procedure: COLONOSCOPY;  Surgeon: Julio Rudd MD;  Location: [facility] ENDO;  Service: Endoscopy;  Laterality: N/A;    COLONOSCOPY N/A 11/28/2018    Procedure: COLONOSCOPY;  Surgeon: Nura Urbina MD;  Location: [facility] ENDO;  Service: Endoscopy;  Laterality: N/A;    COLONOSCOPY N/A 1/29/2019    Procedure: COLONOSCOPY;  Surgeon: Emmanuel Perez MD;  Location: [facility] ENDO;  Service: Endoscopy;  Laterality: N/A;  confirmed appt-SP    COLONOSCOPY N/A 7/26/2022    Procedure: COLONOSCOPY;  Surgeon: James Healy MD;  Location: [facility];  Service: Endoscopy;  Laterality: N/A;  fully vaccinated -  mediport in chest -     CORONARY ANGIOPLASTY WITH STENT PLACEMENT      ESOPHAGEAL MANOMETRY WITH MEASUREMENT OF IMPEDANCE N/A 7/10/2023    Procedure: MANOMETRY, ESOPHAGUS, WITH IMPEDANCE MEASUREMENT;  Surgeon: Miller Phillips MD;  [facility]  Service: Gastroenterology;  Laterality: N/A;  pt on oxygen 2.5L  pt requested Tuesday only  5/31 referred by Dr. Miller Phillips/instr. mailed-st  7/3-r/s, updated instructions reviewed with pt in detail via phone, pt verbalized understanding-KPvt    ESOPHAGOGASTRODUODENOSCOPY N/A 1/25/2021    Procedure: EGD (ESOPHAGOGASTRODUODENOSCOPY);  Surgeon: Dmitry Gonzalez MD;  Location: [facility];  Service: Endoscopy;  Laterality: N/A;  rapid test >50 miles -ml    ESOPHAGOGASTRODUODENOSCOPY N/A 11/8/2022    Procedure: EGD (ESOPHAGOGASTRODUODENOSCOPY);  Surgeon: Tapan Stevenson MD;  Location: [facility];  Service: Endoscopy;  Laterality: N/A;  w/dilation  fully vaccinated, instructions mailed-[facility]  11/4 pt called did not receive instructions, does not have portal or email, went over prep instructions and  medication instructions with pt on the phone -LW    ESOPHAGOGASTRODUODENOSCOPY N/A 2023    Procedure: EGD (ESOPHAGOGASTRODUODENOSCOPY);  Surgeon: Miller Phillips MD;  Location: [facility] ENDO;  Service: Endoscopy;  Laterality: N/A;  botox injection  inst mailed    ESOPHAGOGASTRODUODENOSCOPY N/A 2025    Procedure: EGD (ESOPHAGOGASTRODUODENOSCOPY);  Surgeon: Nahum Cardoza MD;  Location: [facility] ENDO;  Service: Gastroenterology;  Laterality: N/A;  jm/hatmelah/mailed/pt only wants tuesday. Pt. has Achalashia EC/ Pt. is no longer on Plavix per Dr. Kay dated 3/24/25 .EC    EYE SURGERY Bilateral 2018    cataract     LEFT HEART CATHETERIZATION Left 2020    Procedure: Left heart cath, rra, noon;  Surgeon: Guevara Skelton MD;  Location: [facility] CATH LAB;  Service: Cardiology;  Laterality: Left;  RN PREOP 2020---COVID NEGATIVE ON     PORTACATH PLACEMENT Right 2018    sweat glands axillary regions Bilateral     TONSILLECTOMY           Social History:   Social History     Socioeconomic History    Marital status: Single   Tobacco Use    Smoking status: Former     Current packs/day: 0.00     Average packs/day: 0.3 packs/day for 50.0 years (12.5 ttl pk-yrs)     Types: Cigarettes     Start date: 1967     Quit date: 2017     Years since quittin.5     Passive exposure: Past    Smokeless tobacco: Former    Tobacco comments:     7 years since smoked    Substance and Sexual Activity    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     Drug use: No    Sexual activity: Not Currently     Social Drivers of Health     Financial Resource Strain: Low Risk  (3/6/2025)    Overall Financial Resource Strain (CARDIA)     Difficulty of Paying Living Expenses: Not hard at all   Food Insecurity: No Food Insecurity (3/6/2025)    Hunger Vital Sign     Worried About Running Out of Food in the Last Year: Never true     Ran Out of Food in the Last Year: Never true   Transportation Needs: No Transportation Needs  (3/6/2025)    PRAPARE - Transportation     Lack of Transportation (Medical): No     Lack of Transportation (Non-Medical): No   Physical Activity: Inactive (3/6/2025)    Exercise Vital Sign     Days of Exercise per Week: 0 days     Minutes of Exercise per Session: 0 min   Stress: No Stress Concern Present (3/6/2025)    [employer] - Occupational Stress Questionnaire     Feeling of Stress : Only a little   Housing Stability: Low Risk  (3/6/2025)    Housing Stability Vital Sign     Unable to Pay for Housing in the Last Year: No     Homeless in the Last Year: No         Family Hx:  No family history of pulmonary fibrosis, pre-mature graying of hair, cirrhosis, or hematologic malignancies  No family history of emphysema or spontaneous PTX  Father - passed lung cancer in his 70s    Allergies and Pertinent Medications   Allergies and Medications Reviewed    Patient has no known allergies.  Current Outpatient Medications on File Prior to Visit   Medication Sig Dispense Refill    aspirin (ECOTRIN) 81 MG EC tablet Take 81 mg by mouth once daily.       EScitalopram oxalate (LEXAPRO) 10 MG tablet Take 1 tablet (10 mg total) by mouth once daily. 90 tablet 3    isosorbide mononitrate (IMDUR) 30 MG 24 hr tablet TAKE 1 TABLET BY MOUTH EVERY DAY. Hold if SBP <120 90 tablet 3    methocarbamoL (ROBAXIN) 500 MG Tab Take 1 tablet (500 mg total) by mouth 2 (two) times daily as needed (back spasm). 30 tablet 0    nitroGLYCERIN (NITROSTAT) 0.4 MG SL tablet PLACE 1 TAB UNDER TONGUE EVERY 5 MINUTES x 3 DOSES AS NEEDED FOR CHEST PAIN. IF NOT RESOLVED CALL 911 25 tablet 11    pantoprazole (PROTONIX) 40 MG tablet Take 1 tablet (40 mg total) by mouth once daily. 90 tablet 3    rosuvastatin (CRESTOR) 20 MG tablet Take 1 tablet (20 mg total) by mouth once daily. 90 tablet 3    [DISCONTINUED] albuterol (PROVENTIL) 2.5 mg /3 mL (0.083 %) nebulizer solution NEBULIZE 1 vial EVERY 6 HOURS AS NEEDED FOR WHEEZING 540 mL 1     [DISCONTINUED] albuterol (VENTOLIN HFA) 90 mcg/actuation inhaler Inhale 2 puffs into the lungs every 6 (six) hours as needed for Wheezing. Rescue      [DISCONTINUED] fluticasone-umeclidin-vilanter (TRELEGY ELLIPTA) 200-62.5-25 mcg inhaler Inhale 1 puff into the lungs once daily. 60 each 11    acetaminophen (TYLENOL) 650 MG TbSR Take 650 mg by mouth every 8 (eight) hours. (Patient not taking: Reported on 6/9/2025)      azelastine (ASTELIN) 137 mcg (0.1 %) nasal spray  (Patient not taking: Reported on 6/9/2025)      clopidogreL (PLAVIX) 75 mg tablet Take 1 tablet (75 mg total) by mouth once daily. (Patient not taking: Reported on 6/9/2025) 90 tablet 3    diclofenac (VOLTAREN) 50 MG EC tablet TAKE 1 TABLET BY MOUTH TWICE DAILY AS NEEDED (Patient not taking: Reported on 6/9/2025) 45 tablet 2    fluticasone propionate (FLONASE) 50 mcg/actuation nasal spray SHAKE WELL & USE TWO SPRAYS EACH NOSTRIL ONCE A DAY (Patient not taking: Reported on 6/9/2025) 48 g 3    levocetirizine (XYZAL) 5 MG tablet TAKE 1 TABLET BY MOUTH EVERY DAY IN THE EVENING FOR ALLERGIES (Patient not taking: Reported on 6/9/2025) 90 tablet 1    montelukast (SINGULAIR) 10 mg tablet  (Patient not taking: Reported on 6/9/2025)      pregabalin (LYRICA) 75 MG capsule Take 75 mg by mouth 2 (two) times daily. (Patient not taking: Reported on 6/9/2025)       No current facility-administered medications on file prior to visit.              Physical Exam   /71   Pulse 86   Wt 66.3 kg (146 lb 0.9 oz)   LMP  (LMP Unknown)   BMI 26.71 kg/m²   SpO2 96% at rest checked    Constitutional: No acute distress, Atraumatic   HEENT: moist mucus membranes, extraocular movements intact  Cardiovascular: regular rate and rhythm, no murmurs, rubs or gallops  Pulmonary: normal respiratory rate and chest rise, no chest wall deformity, normal breath sounds with no wheezing or crackles  Abdominal: non-distended, bowel sounds present  Musculoskeletal: No lower extremity  "edema, no clubbing  Neurological: normal speech/abhay, moves all extremities against gravity  Skin: no finger cyanosis, no rashes on exposed body parts  Psych: Appropriate affect, normal mood       Diagnostic Studies      All diagnostic studies relevant to chief complaint reviewed personally    Labs:  Lab Results   Component Value Date    WBC 4.66 05/14/2025    HGB 13.1 05/14/2025    HGB 14.3 03/06/2025     05/14/2025     03/06/2025    MCV 90 05/14/2025    MCV 91 03/06/2025     Lab Results   Component Value Date     05/14/2025     03/06/2025    K 3.7 05/14/2025    K 3.9 03/06/2025    CO2 25 05/14/2025    CO2 24 03/06/2025    BUN 14 05/14/2025    CREATININE 0.8 05/14/2025    MG 1.9 05/14/2025     Lab Results   Component Value Date    AST 17 05/14/2025    AST 17 03/06/2025    ALT 14 05/14/2025    ALT 16 03/06/2025    ALBUMIN 3.9 05/14/2025    ALBUMIN 3.5 03/06/2025    PROT 6.8 05/14/2025    PROT 7.7 03/06/2025    BILITOT 0.6 05/14/2025    BILITOT 0.7 03/06/2025         PFTs:  Pulmonary Functions Testing Results:  No results found for: "FEV1", "FVC", "VRR9IPO", "TLC", "DLCO"      FVC FEV1 FEV/FVC TLC DLCO 6MWT Interpret                                                         TTE:  Results for orders placed during the hospital encounter of 05/21/24    Echo Saline Bubble? Yes    Interpretation Summary    No intracardiac source of embolus noted on this exam.  If high clinical suspicion, consider MORELIA +/- bubble study.    Left Ventricle: The left ventricle is normal in size. Normal wall thickness. There is normal systolic function with a visually estimated ejection fraction of 65 - 70%. Grade I diastolic dysfunction.    Right Ventricle: Normal right ventricular cavity size. Systolic function is normal.    Left Atrium: Left atrium is mildly dilated. Agitated saline study of the atrial septum is negative after vasalva maneuver, suggesting absence of intracardiac shunt at the atrial level.    Aortic " "Valve: The aortic valve is a trileaflet valve. There is moderate aortic valve sclerosis. Mildly restricted motion. There is mild to moderate stenosis. Aortic valve area by VTI is 1.22 cm². Aortic valve peak velocity is 2.96 m/s. Mean gradient is 22 mmHg. The dimensionless index is 0.42.    Pulmonary Artery: The estimated pulmonary artery systolic pressure is 33 mmHg.            Assessment and Plan   Ade Castellano is a 79 y.o. female  a history of  COPD, breast/lung cancer, small vessel, CAD, SUNITHA, chronic diastolic HF (grade 1), mild-moderate AS, achalasia presenting with chief complaint of chronic hypoxemic respiratory failure 2/2 copd    Problem List:  # multilobar bronchiolitis and consolidation -acute, improving- s/p augmentin, concerned for aspiration (see below), w/ recurrent findings on ct chest. Check sputum cultures  # chronic hypoxemic respiratory failure req 3-6 L/min O2 2/2 COPD Group B (mmRC 3) - chronic, stable.  Better on trelegy concerned aspiration may be playing a role  - continue trelegy 200 mcg dose daily, albuterol PRN  - consider azithromycin v. daliresp  - continue O2  3-6 L/min with exertion (declined new O2 rx to keep portable concentrator) -- she is not adherent, reviewed risks of no using O2 w/ exertion including respiratory/cardiac arrest/death  -. Declined pulmonary rehab, see below for suspected aspiration.   # aspiration w/ hx of achalasia - chronic, stable - having regurgitation episodes s/p botox in early 6/2025. She continues to feel food "gets stuck" at the top of her throat. Refer to speech for eval of oropharyngeal dysphagia  # SUNITHA - chronic, untreated - declines sleep study despite reviewing risks stating she "does not need more machines"  # hx of lung cancer - in remission - follows w/  w/ Dr. Holliday       Differential, diagnoses, diagnostic work up, and possible treatments were discussed with the patient. Questions were answered.    Jaqueline Masterson MD  Pulmonary-Critical Care " Medicine              For this visit, the following time was spent  preparing to see the patient (e.g., review of tests)  8minutes  obtaining and/or reviewing separately obtained history 0 minutes  Performing a medically necessary appropriate examination and/or evaluation 10 minutes  Counseling and educating the patient/family/caregiver 10 minutes  Ordering medications, tests, or procedures 1 minutes  Referring and communicating with other health care professionals (when not reported separately) 2minutes  Documenting clinical information in the electronic or other health record 3 minutes  Care coordination (not reported separately) 0minutes    Total time = 34 minutes

## 2025-06-11 ENCOUNTER — LAB VISIT (OUTPATIENT)
Dept: LAB | Facility: HOSPITAL | Age: 80
End: 2025-06-11
Attending: FAMILY MEDICINE
Payer: MEDICARE

## 2025-06-11 ENCOUNTER — OFFICE VISIT (OUTPATIENT)
Dept: FAMILY MEDICINE | Facility: CLINIC | Age: 80
End: 2025-06-11
Payer: MEDICARE

## 2025-06-11 DIAGNOSIS — R30.0 DYSURIA: Primary | ICD-10-CM

## 2025-06-11 DIAGNOSIS — R30.0 DYSURIA: ICD-10-CM

## 2025-06-11 DIAGNOSIS — N39.0 ACUTE UTI: ICD-10-CM

## 2025-06-11 LAB
BACTERIA #/AREA URNS AUTO: ABNORMAL /HPF
BILIRUB UR QL STRIP.AUTO: NEGATIVE
CLARITY UR: CLEAR
COLOR UR AUTO: YELLOW
GLUCOSE UR QL STRIP: NEGATIVE
HGB UR QL STRIP: NEGATIVE
KETONES UR QL STRIP: NEGATIVE
LEUKOCYTE ESTERASE UR QL STRIP: ABNORMAL
MICROSCOPIC COMMENT: ABNORMAL
NITRITE UR QL STRIP: NEGATIVE
PH UR STRIP: 7 [PH]
PROT UR QL STRIP: ABNORMAL
RBC #/AREA URNS AUTO: 4 /HPF (ref 0–4)
SP GR UR STRIP: 1.02
SQUAMOUS #/AREA URNS AUTO: 7 /HPF
UROBILINOGEN UR STRIP-ACNC: NEGATIVE EU/DL
WBC #/AREA URNS AUTO: 11 /HPF (ref 0–5)
YEAST UR QL AUTO: ABNORMAL /HPF

## 2025-06-11 PROCEDURE — 81001 URINALYSIS AUTO W/SCOPE: CPT | Mod: HCNC

## 2025-06-11 PROCEDURE — 98016 BRIEF COMUNICAJ TECH-BSD SVC: CPT | Mod: HCNC,NDTC,, | Performed by: FAMILY MEDICINE

## 2025-06-11 PROCEDURE — 1157F ADVNC CARE PLAN IN RCRD: CPT | Mod: CPTII,HCNC,NDTC, | Performed by: FAMILY MEDICINE

## 2025-06-11 PROCEDURE — 87086 URINE CULTURE/COLONY COUNT: CPT | Mod: HCNC

## 2025-06-11 RX ORDER — SULFAMETHOXAZOLE AND TRIMETHOPRIM 800; 160 MG/1; MG/1
1 TABLET ORAL 2 TIMES DAILY
Qty: 10 TABLET | Refills: 0 | Status: CANCELLED | OUTPATIENT
Start: 2025-06-11 | End: 2025-06-16

## 2025-06-11 NOTE — PROGRESS NOTES
Audio Only Telehealth Visit     The patient location is: [place]  The chief complaint leading to consultation is: uti  Visit type: Virtual visit with audio only (telephone)  Total time spent in medical discussion with patient: 10 minutes  Total time spent on date of the encounter:11 minutes       The reason for the audio only service rather than synchronous audio and video virtual visit was related to technical difficulties or patient preference/necessity.       Each patient to whom I provide medical services by telemedicine is:  (1) informed of the relationship between the physician and patient and the respective role of any other health care provider with respect to management of the patient; and (2) notified that they may decline to receive medical services by telemedicine and may withdraw from such care at any time. Patient verbally consented to receive this service via voice-only telephone call.       HPI: 79 year old female presents she has been urinating every 30 -45 minutes. Her urine was discolored and appeared orange as well. She has dysuria, urinary frequency or urgency.      Assessment and plan:  79 yfear old female with dysuria.   Diagnoses and all orders for this visit:    Dysuria  -     Urinalysis, Reflex to Urine Culture Urine, Clean Catch; Future    Acute UTI  Will check urinalysis and treat with bactrim if needed. Increase water intake  Other orders  The following orders have not been finalized:  -     Cancel: sulfamethoxazole-trimethoprim 800-160mg (BACTRIM DS) 800-160 mg Tab                              This service was not originating from a related E/M service provided within the previous 7 days nor will  to an E/M service or procedure within the next 24 hours or my soonest available appointment.  Prevailing standard of care was able to be met in this audio-only visit.          Est Patient Audio Only requirements-Total time in medical discussion with the patient of more than 10 minutes  AND Total time on the date of the encounter  27650  Total time in medical discussion with the patient more than 10 minutes; Total time on the date of the encounter-equal to or greater than 10 minutes  71896  Total time in medical discussion with the patient more than 10 minutes; Total time on the date of the encounter-equal to or greater than 20 minutes  95629 Total time in medical discussion with the patient more than 10 minutes; Total time on the date of the encounter-equal to or greater than 30 minutes  34103 Total time in medical discussion with the patient more than 10 minutes; Total time on the date of the encounter-equal to or greater than 40 minutes  (This text will automatically delete):[id number]}   Subjective     Patient ID: Ade Castellano is a 79 y.o. female.    Chief Complaint: No chief complaint on file.    HPI    History of Present Illness               Review of Systems         Objective     There were no vitals filed for this visit.     Physical Exam  Physical Exam                Assessment and Plan     1. Dysuria  -     Urinalysis, Reflex to Urine Culture Urine, Clean Catch; Future; Expected date: 06/11/2025    2. Acute UTI      Diagnoses and all orders for this visit:    Dysuria  -     Urinalysis, Reflex to Urine Culture Urine, Clean Catch; Future    Acute UTI    Other orders  The following orders have not been finalized:  -     Cancel: sulfamethoxazole-trimethoprim 800-160mg (BACTRIM DS) 800-160 mg Tab        Assessment & Plan                      No follow-ups on file.        This note was generated with the assistance of ambient listening technology. Verbal consent was obtained by the patient and accompanying visitor(s) for the recording of patient appointment to facilitate this note. I attest to having reviewed and edited the generated note for accuracy, though some syntax or spelling errors may persist. Please contact the author of this note for any clarification.

## 2025-06-12 ENCOUNTER — RESULTS FOLLOW-UP (OUTPATIENT)
Dept: FAMILY MEDICINE | Facility: CLINIC | Age: 80
End: 2025-06-12

## 2025-06-12 DIAGNOSIS — J30.9 ALLERGIC SINUSITIS: ICD-10-CM

## 2025-06-12 RX ORDER — LEVOCETIRIZINE DIHYDROCHLORIDE 5 MG/1
5 TABLET, FILM COATED ORAL NIGHTLY
Qty: 90 TABLET | Refills: 1 | Status: SHIPPED | OUTPATIENT
Start: 2025-06-12

## 2025-06-12 NOTE — TELEPHONE ENCOUNTER
No care due was identified.  [facility] Embedded Care Due Messages. Reference number: [id number].   6/12/2025 11:33:45 AM CDT

## 2025-06-13 ENCOUNTER — OFFICE VISIT (OUTPATIENT)
Dept: CARDIOLOGY | Facility: CLINIC | Age: 80
End: 2025-06-13
Payer: MEDICARE

## 2025-06-13 ENCOUNTER — LAB VISIT (OUTPATIENT)
Dept: LAB | Facility: HOSPITAL | Age: 80
End: 2025-06-13
Attending: INTERNAL MEDICINE
Payer: MEDICARE

## 2025-06-13 VITALS
DIASTOLIC BLOOD PRESSURE: 78 MMHG | HEART RATE: 66 BPM | WEIGHT: 148.38 LBS | SYSTOLIC BLOOD PRESSURE: 104 MMHG | BODY MASS INDEX: 26.29 KG/M2 | HEIGHT: 63 IN

## 2025-06-13 DIAGNOSIS — I70.0 ATHEROSCLEROSIS OF AORTA: Chronic | ICD-10-CM

## 2025-06-13 DIAGNOSIS — J43.2 CENTRILOBULAR EMPHYSEMA: Primary | Chronic | ICD-10-CM

## 2025-06-13 DIAGNOSIS — I25.118 CORONARY ARTERY DISEASE OF NATIVE ARTERY OF NATIVE HEART WITH STABLE ANGINA PECTORIS: Chronic | ICD-10-CM

## 2025-06-13 DIAGNOSIS — I50.32 CHRONIC HEART FAILURE WITH PRESERVED EJECTION FRACTION: ICD-10-CM

## 2025-06-13 DIAGNOSIS — I10 PRIMARY HYPERTENSION: Chronic | ICD-10-CM

## 2025-06-13 DIAGNOSIS — J21.9 BRONCHIOLITIS: ICD-10-CM

## 2025-06-13 LAB — BACTERIA UR CULT: NORMAL

## 2025-06-13 PROCEDURE — 87102 FUNGUS ISOLATION CULTURE: CPT | Mod: HCNC

## 2025-06-13 PROCEDURE — 87070 CULTURE OTHR SPECIMN AEROBIC: CPT | Mod: HCNC

## 2025-06-13 PROCEDURE — 99999 PR PBB SHADOW E&M-EST. PATIENT-LVL IV: CPT | Mod: PBBFAC,HCNC,, | Performed by: INTERNAL MEDICINE

## 2025-06-13 PROCEDURE — 87206 SMEAR FLUORESCENT/ACID STAI: CPT | Mod: HCNC

## 2025-06-13 PROCEDURE — 87116 MYCOBACTERIA CULTURE: CPT | Mod: HCNC

## 2025-06-13 NOTE — PROGRESS NOTES
Subjective   Patient ID:  Ade Castellano is a 79 y.o. female who presents for follow-up of Follow-up      HPI        CAD - KUN to RCA 2006, diastolic CHF, HTN, HLD, lung CA, breast CA     Previously followed by Dr Skelton  Patient with a past medical history of coronary artery disease.  Past medical history significant for lung cancer.  No significant coronary artery disease on coronary angiogram had luminal irregularities.     Heart failure with preserved ejection fraction     Squamous cell carcinoma of lung     Breast cancer     Aortic arch atherosclerosis     [facility] 8/13/20  Non-obstructive CAD.  No significant coronary artery stenosis. Luminal irregularities. Previously placed RCA stent is widely patent.     Stress test 7/24/20    The study shows abnormal myocardial perfusion.    Abnormal myocardial perfusion study.    Perfusion Defect There is a mild to moderate intensity, small to moderate sized, mostly reversible with some fixed areas defect in the anteroapical wall(s).    Gated perfusion images showed an ejection fraction of 71%    There is normal wall motion at rest and post stress.    The EKG portion of this study is negative for ischemia.    The patient reported no chest pain during the stress test.    There were no arrhythmias during stress.     Echo 5/22/24    No intracardiac source of embolus noted on this exam.  If high clinical suspicion, consider MORELIA +/- bubble study.    Left Ventricle: The left ventricle is normal in size. Normal wall thickness. There is normal systolic function with a visually estimated ejection fraction of 65 - 70%. Grade I diastolic dysfunction.    Right Ventricle: Normal right ventricular cavity size. Systolic function is normal.    Left Atrium: Left atrium is mildly dilated. Agitated saline study of the atrial septum is negative after vasalva maneuver, suggesting absence of intracardiac shunt at the atrial level.    Aortic Valve: The aortic valve is a trileaflet valve. There is  moderate aortic valve sclerosis. Mildly restricted motion. There is mild to moderate stenosis. Aortic valve area by VTI is 1.22 cm². Aortic valve peak velocity is 2.96 m/s. Mean gradient is 22 mmHg. The dimensionless index is 0.42.    Pulmonary Artery: The estimated pulmonary artery systolic pressure is 33 mmHg.        Holter 7/24/20  The diary was not returned.  NSR. Heart rates varied between 47 and 89 bpm with an average of 58 bpm.  Rare PACs / PVCs     EKG 4/25/23 Sinus bradycardia otherwise ok     6/21/23 Denies CP, Stable LOGAN  Stopped metoprolol recently for hypotension  BP controlled  Echo with EF 55% and moderate MR  Continue Rx for CAD, CHF, HTN, HLD  OV 3 months with BNP, BMP     9/21/23 labs done this morning. Denies CP, LOGAN improved  EKG sinus bradycardia 51 otherwise ok  BP controlled  Continue Rx for CAD, CHF, HTN, HLD  F/u labs if stable then OV 6 months     Admitted 5/21/24  Ade LOPEZ Gray with a pmh of CAD, COPD, history of MI (2006-stents), history of Bell's palsy with left facial droop (chronic) and cancer of cervix, lung, breast, and lymph nodes was placed under observation for further medical management of her dizziness and possible TIA workup. Patient with blurry vision, slurred speech, blurry vision, and feeling her body pulling to the right for the past few days. Patients CTA head was unremarkable with no evidence of proximal significant stenosis or large vessel occlusion. CTA neck showed atherosclerotic plaquing of the carotid bifurcations and proximal ICAs with less than 50% proximal ICA stenosis. CT head showed no evidence of acute intracranial hemorrhage or definite abnormal parenchymal enhancement. Patient was placed under observation for MRI brain and neurology evaluation. Patients labs were fairly unremarkable except lipid profile showing elevated triglycerides 178. Echocardiogram with bubble study showed normal systolic function with EF 65-70% with grade 1 dd. MRI brain without  contrast showed no acute intracranial abnormality. Neurology evaluation concluded that patients symptoms concerning for vascular etiology and mentions that at time posterior circulation infarcts can take up to 5 days to appear on MRI and therefore will treat accordingly. Patients etiology appears to be secondary to small vessel disease. Recommendations were to continue Aspirin 81 mg daily, plavix 75 mg for 21 days followed by ASA 81 mg monotherapy thereafter. Patient also will be started on high intensity statin atorvastatin 40 mg daily. Neurology discussed goal parameters for TIA/stroke with patient: LDL <70 mg/dl, HbA1C <7 %, and SBP <130/80 along with diet and exercise with lifestyle modifications. PT/OT evaluated patient and recommended low intensity therapy. Patients condition discussed at length with patient and explained precautions I.e to move slowly and avoid quick movements when getting up to prevent syncopal episodes. Patient understood and had no further questions.      7/15/24 Denies CP, mild stable LOGAN, denies further TIA symptoms  BP controlled  Change lipitor back to crestor at patient request  Continue Rx for CAD, CHF, HTN, HLD  OV 6 months     3/12/25 Last note from PCP staes she was found to have a rentinal stroke on recent eye exam. ASA was increased to 325 qd  Denies CP or SOB. Occasional dizziness  EKG sinus bradycardia 44  Still on metoprolol 25 bid  BP controlled  Stop metoprolol with sinus bradycardia   Decrease ASA 81 qd add plavix 75 qd for recent retinal stoke seen on eye exam  Continue Rx for CAD, CHF, HTN, HLD  OV 3 months    6/13/25 patient stopped plavix  - said it gave her chills. Feels better off of metoprolol  Denies CP or SOB       Review of Systems   Constitutional: Negative for decreased appetite.   HENT:  Negative for ear discharge.    Eyes:  Negative for blurred vision.   Respiratory:  Negative for hemoptysis.    Endocrine: Negative for polyphagia.   Hematologic/Lymphatic:  Negative for adenopathy.   Skin:  Negative for color change.   Musculoskeletal:  Negative for joint swelling.   Genitourinary:  Negative for bladder incontinence.   Neurological:  Negative for brief paralysis.   Psychiatric/Behavioral:  Negative for hallucinations.    Allergic/Immunologic: Negative for hives.          Objective     Physical Exam  Constitutional:       Appearance: She is well-developed.   HENT:      Head: Normocephalic and atraumatic.   Eyes:      Conjunctiva/sclera: Conjunctivae normal.      Pupils: Pupils are equal, round, and reactive to light.   Cardiovascular:      Rate and Rhythm: Normal rate.      Pulses: Intact distal pulses.      Heart sounds: Normal heart sounds.   Pulmonary:      Effort: Pulmonary effort is normal.      Breath sounds: Normal breath sounds.   Abdominal:      General: Bowel sounds are normal.      Palpations: Abdomen is soft.   Musculoskeletal:         General: Normal range of motion.      Cervical back: Normal range of motion and neck supple.   Skin:     General: Skin is warm and dry.   Neurological:      Mental Status: She is alert and oriented to person, place, and time.            Assessment and Plan     1. Centrilobular emphysema    2. Coronary artery disease of native artery of native heart with stable angina pectoris    3. Atherosclerosis of aorta    4. Primary hypertension    5. Chronic heart failure with preserved ejection fraction        Plan:     Continue Rx for CAD, CHF, HTN, HLD  OV 6 months    Advance Care Planning     Date: 06/13/2025  Patient did not wish or was not able to name a surrogate decision maker or provide an Advance Care Plan.

## 2025-06-15 LAB
BACTERIA SPEC CULT: ABNORMAL
BACTERIA SPEC CULT: ABNORMAL
GRAM STN SPEC: ABNORMAL

## 2025-06-16 RX ORDER — CEFUROXIME AXETIL 500 MG/1
500 TABLET ORAL 2 TIMES DAILY
Qty: 14 TABLET | Refills: 0 | Status: SHIPPED | OUTPATIENT
Start: 2025-06-16 | End: 2025-06-23

## 2025-06-16 NOTE — PROGRESS NOTES
Brief Pulm Note    Sputum w/ klebsiella oxytoca. Called patient, reviewed and sent in cefuroxime 500 mg twice a day x 7 days. Reviewed side effect profile, expressed understanding    Jaqueline Masterson MD

## 2025-06-17 LAB — ACID FAST MOD KINY STN SPEC: NORMAL

## 2025-06-18 LAB — FUNGUS SPEC CULT: ABNORMAL

## 2025-06-19 ENCOUNTER — TELEPHONE (OUTPATIENT)
Dept: FAMILY MEDICINE | Facility: CLINIC | Age: 80
End: 2025-06-19
Payer: MEDICARE

## 2025-06-19 RX ORDER — ROSUVASTATIN CALCIUM 20 MG/1
TABLET, COATED ORAL
Qty: 90 TABLET | Refills: 3 | Status: SHIPPED | OUTPATIENT
Start: 2025-06-19

## 2025-06-19 NOTE — TELEPHONE ENCOUNTER
Nurse called patient to advise.           Copied from CRM #[id number]. Topic: General Inquiry - Test Results  >> Jun 19, 2025  1:20 PM Med Assistant Bear wrote:  Type:  Test Results    Who Called: Self    Name of Test (Lab/Mammo/Etc): Urinalysis    Date of Test: 6/11/25    Ordering Provider: Elana    Where the test was performed: [facility]    Would the patient rather a call back or a response via [facility]? Yes, call     Best Call Back Number:  [phone] (home)

## 2025-06-20 LAB — MYCOBACTERIUM SPEC QL CULT: NORMAL

## 2025-06-24 NOTE — PLAN OF CARE
Recommendations     Recommendation/Intervention:   1. With current propofol rate rec TF: peptamen Bariatric to goal of 40 ml/hr to better meet protein needs    -TF to provide: 960, 88 g pro, 806 ml fluid.   -FLuid flushes for normal fluid intake: 165 ml q 6 hrs   -Hold for TF >250 ml or sx/o n/v/abd discomfort; HOB >30   -TF to provide: 960 kcal, 88 g pro, 806 ml fluid (1472 kcal with propofol).   2. S/p extubation advance diet with texture per SLP to Cardiac  3. RD to monitor     Goals: Meet 85% EEN  Nutrition Goal Status: new  Communication of RD Recs: discussed on rounds     Continuum of Care Plan     Referral to Outpatient Services:  (D/C planning: Too soon to determine)   Spoke to pt explaining provider is moving office location and was reaching out to schedule their visit for the same day just a different time pt expressed understanding and is scheduled for 4/2  at 9:40, pt given address as well

## 2025-07-01 ENCOUNTER — TELEPHONE (OUTPATIENT)
Dept: GASTROENTEROLOGY | Facility: CLINIC | Age: 80
End: 2025-07-01
Payer: MEDICARE

## 2025-07-01 DIAGNOSIS — K22.0 ACHALASIA: Primary | ICD-10-CM

## 2025-07-01 NOTE — TELEPHONE ENCOUNTER
Spoke with patient on the phone she stated that she is feeling much better and refused scheduling with surgery.

## 2025-07-01 NOTE — TELEPHONE ENCOUNTER
----- Message from Nahum Cardoza MD sent at 2025  9:52 AM CDT -----  I put in a surgery referral for evaluation of treatment such as myotomy for her achalasia given botox didn't resolve her symptoms. Could we help get her scheduled with the surgery group at [facility]. Please schedule with group that has Bear.    Thank you,  PG  ----- Message -----  From: Asha Stevenson  Sent: 2025   9:25 AM CDT  To: Nahum Cardoza MD    Source  Ade Castellano (Patient)     Subject  Ade Castellano (Patient)     Topic  General Inquiry - Patient Advice    Communication  Type:  Needs Medical Advice/Symptom-based Call        Who Called: self      Please review and advise.    Symptoms (please be specific): caller states food still won't go down(gets stuck) after procedure          How long has patient had these symptoms:  1 day        Would the patient rather a call back or a response via [facility]? [facility]        Best Call Back Number: .[phone]            Additional Information:      Patient Information    Patient Name Gender  SSAde Hogan Female 1945 [id number]    Contacts    Contact Date/Time Type Contact Phone/Fax  2025 09:07 AM CDT Phone (Incoming) Ade Castellano (Self) [phone]    Routing History     From To [facility]  2025 09:17 AM Raya Chen STAFF Routine

## 2025-07-30 ENCOUNTER — TELEPHONE (OUTPATIENT)
Dept: HEMATOLOGY/ONCOLOGY | Facility: CLINIC | Age: 80
End: 2025-07-30
Payer: MEDICARE

## 2025-07-30 DIAGNOSIS — R05.8 COUGH PRODUCTIVE OF PURULENT SPUTUM: ICD-10-CM

## 2025-07-30 DIAGNOSIS — Z85.118 HISTORY OF LUNG CANCER: Primary | ICD-10-CM

## 2025-07-30 NOTE — TELEPHONE ENCOUNTER
Miss barnes called and would like to know if we could get a chest x ray on her as she has been having a really bad cough with beige sputum, X 3-4 days  denies blood  denies temp  Said it is on her Rt side the same side the previous Lung ca  was on   She had diarrhea X 2 yesterday and  3 x today with gray colored stool no nausea    She would like to know if she can have a chest xray  She is due to see you on the 13 th  She is concerned the cancer may be back in her lung  Please advise     Per Dr Holliday order chest xray

## 2025-07-31 ENCOUNTER — HOSPITAL ENCOUNTER (OUTPATIENT)
Dept: RADIOLOGY | Facility: HOSPITAL | Age: 80
Discharge: HOME | End: 2025-07-31
Attending: INTERNAL MEDICINE
Payer: MEDICARE

## 2025-07-31 DIAGNOSIS — Z85.118 HISTORY OF LUNG CANCER: ICD-10-CM

## 2025-07-31 DIAGNOSIS — R05.8 COUGH PRODUCTIVE OF PURULENT SPUTUM: ICD-10-CM

## 2025-07-31 PROCEDURE — 71046 X-RAY EXAM CHEST 2 VIEWS: CPT | Mod: 26,HCNC,, | Performed by: RADIOLOGY

## 2025-07-31 PROCEDURE — 71046 X-RAY EXAM CHEST 2 VIEWS: CPT | Mod: TC,HCNC

## 2025-08-05 ENCOUNTER — OFFICE VISIT (OUTPATIENT)
Dept: CARDIOLOGY | Facility: CLINIC | Age: 80
End: 2025-08-05
Payer: MEDICARE

## 2025-08-05 VITALS
RESPIRATION RATE: 19 BRPM | HEART RATE: 66 BPM | OXYGEN SATURATION: 97 % | WEIGHT: 142.19 LBS | BODY MASS INDEX: 26.17 KG/M2 | DIASTOLIC BLOOD PRESSURE: 69 MMHG | SYSTOLIC BLOOD PRESSURE: 120 MMHG | HEIGHT: 62 IN

## 2025-08-05 DIAGNOSIS — E78.5 HYPERLIPIDEMIA LDL GOAL <55: ICD-10-CM

## 2025-08-05 DIAGNOSIS — I35.0 NONRHEUMATIC AORTIC VALVE STENOSIS: ICD-10-CM

## 2025-08-05 DIAGNOSIS — R00.1 BRADYCARDIA: Primary | ICD-10-CM

## 2025-08-05 DIAGNOSIS — E66.3 OVERWEIGHT (BMI 25.0-29.9): ICD-10-CM

## 2025-08-05 DIAGNOSIS — I10 PRIMARY HYPERTENSION: Chronic | ICD-10-CM

## 2025-08-05 DIAGNOSIS — I50.32 CHRONIC HEART FAILURE WITH PRESERVED EJECTION FRACTION: ICD-10-CM

## 2025-08-05 DIAGNOSIS — G45.9 TIA (TRANSIENT ISCHEMIC ATTACK): ICD-10-CM

## 2025-08-05 DIAGNOSIS — I25.118 CORONARY ARTERY DISEASE OF NATIVE ARTERY OF NATIVE HEART WITH STABLE ANGINA PECTORIS: Chronic | ICD-10-CM

## 2025-08-05 DIAGNOSIS — I70.0 ATHEROSCLEROSIS OF AORTA: Chronic | ICD-10-CM

## 2025-08-05 DIAGNOSIS — I27.20 PULMONARY HYPERTENSION: ICD-10-CM

## 2025-08-05 PROCEDURE — 1126F AMNT PAIN NOTED NONE PRSNT: CPT | Mod: CPTII,S$GLB,, | Performed by: INTERNAL MEDICINE

## 2025-08-05 PROCEDURE — 3078F DIAST BP <80 MM HG: CPT | Mod: CPTII,S$GLB,, | Performed by: INTERNAL MEDICINE

## 2025-08-05 PROCEDURE — 99214 OFFICE O/P EST MOD 30 MIN: CPT | Mod: S$GLB,,, | Performed by: INTERNAL MEDICINE

## 2025-08-05 PROCEDURE — 1159F MED LIST DOCD IN RCRD: CPT | Mod: CPTII,S$GLB,, | Performed by: INTERNAL MEDICINE

## 2025-08-05 PROCEDURE — 1157F ADVNC CARE PLAN IN RCRD: CPT | Mod: CPTII,S$GLB,, | Performed by: INTERNAL MEDICINE

## 2025-08-05 PROCEDURE — 3288F FALL RISK ASSESSMENT DOCD: CPT | Mod: CPTII,S$GLB,, | Performed by: INTERNAL MEDICINE

## 2025-08-05 PROCEDURE — 1101F PT FALLS ASSESS-DOCD LE1/YR: CPT | Mod: CPTII,S$GLB,, | Performed by: INTERNAL MEDICINE

## 2025-08-05 PROCEDURE — 1160F RVW MEDS BY RX/DR IN RCRD: CPT | Mod: CPTII,S$GLB,, | Performed by: INTERNAL MEDICINE

## 2025-08-05 PROCEDURE — 99999 PR PBB SHADOW E&M-EST. PATIENT-LVL V: CPT | Mod: PBBFAC,,, | Performed by: INTERNAL MEDICINE

## 2025-08-05 PROCEDURE — 3074F SYST BP LT 130 MM HG: CPT | Mod: CPTII,S$GLB,, | Performed by: INTERNAL MEDICINE

## 2025-08-05 NOTE — PROGRESS NOTES
CARDIOLOGY CLINIC VISIT        HISTORY OF PRESENT ILLNESS:     08/05/2025: Ade Castellano presents for continued care. In June her metoprolol was discontinued. At that time she had noted dizziness. EKG showed SB HR 44. She admits to some anxiety since it was discontinued because she had been on the medication for a long time. Has since been started on Lexapro and is feeling better. EKG today shows sinus bradycardia. She denies chest pain. SOB when she has to walk long distances. History of centrilobular emphysema as well. Has had episodes of bruising since clopidogrel was initiated. TIA earlier this year. Denies chest pain. No pnd or orthopnea. No dizziness. No LOC.    CARDIOVASCULAR HISTORY:     Diastolic heart failure  Coronary artery disease: pRCA PCI 6/20/2006   - 3.5/33 [facility]  Aortic stenosis  Pulmonary hypertension  Aortic atherosclerosis    PAST MEDICAL HISTORY:     Past Medical History:   Diagnosis Date    Abnormal stress test 8/5/2020    Acute respiratory failure with hypoxia and hypercapnia 11/29/2017    Angina pectoris     Arthritis     Bell's palsy     left facial weakness    Breast cancer     RIGHT    CAD (coronary artery disease)     Cervical cancer     Chronic bronchitis     Chronic heart failure with preserved ejection fraction 8/5/2020    Chronic heart failure with preserved ejection fraction 8/5/2020    COPD (chronic obstructive pulmonary disease)     Dr. Katz    Dental bridge present     Emphysema of lung     H/O colonoscopy 06/2017    due for repeat colonsocopy in 6/2018    History of Bell's palsy     History of heart artery stent     Dr. Ortiz  x2 stents    Hyperlipidemia     Hypertension     Lung cancer     Myocardial infarction     SUNITHA (obstructive sleep apnea)     intolerant to mask    SUNITHA (obstructive sleep apnea)     intolerant to mask     Pneumonia     Pneumonia due to other staphylococcus     PUD (peptic ulcer disease)     Severe anemia 6/11/2018    Sleep apnea     Vaginal delivery      x1       PAST SURGICAL HISTORY:     Past Surgical History:   Procedure Laterality Date    ADENOIDECTOMY      BREAST BIOPSY Right     x3    BREAST LUMPECTOMY      BREAST SURGERY      lumpectomy right side     CERVIX SURGERY      cone    COLONOSCOPY N/A 3/17/2017    Procedure: COLONOSCOPY;  Surgeon: Julio Rudd MD;  Location: [facility] ENDO;  Service: Endoscopy;  Laterality: N/A;    COLONOSCOPY N/A 6/30/2017    Procedure: COLONOSCOPY;  Surgeon: Julio Rudd MD;  Location: [facility] ENDO;  Service: Endoscopy;  Laterality: N/A;    COLONOSCOPY N/A 11/28/2018    Procedure: COLONOSCOPY;  Surgeon: Nura Urbina MD;  Location: [facility] ENDO;  Service: Endoscopy;  Laterality: N/A;    COLONOSCOPY N/A 1/29/2019    Procedure: COLONOSCOPY;  Surgeon: Emmanuel Perez MD;  Location: [facility] ENDO;  Service: Endoscopy;  Laterality: N/A;  confirmed appt-SP    COLONOSCOPY N/A 7/26/2022    Procedure: COLONOSCOPY;  Surgeon: James Healy MD;  Location: [facility];  Service: Endoscopy;  Laterality: N/A;  fully vaccinated -  mediport in chest -     CORONARY ANGIOPLASTY WITH STENT PLACEMENT      ESOPHAGEAL MANOMETRY WITH MEASUREMENT OF IMPEDANCE N/A 7/10/2023    Procedure: MANOMETRY, ESOPHAGUS, WITH IMPEDANCE MEASUREMENT;  Surgeon: Miller Phillips MD;  [facility]  Service: Gastroenterology;  Laterality: N/A;  pt on oxygen 2.5L  pt requested Tuesday only  5/31 referred by Dr. Miller Phillips/instr. mailed-st  7/3-r/s, updated instructions reviewed with pt in detail via phone, pt verbalized understanding-KPvt    ESOPHAGOGASTRODUODENOSCOPY N/A 1/25/2021    Procedure: EGD (ESOPHAGOGASTRODUODENOSCOPY);  Surgeon: Dmitry Gonzalez MD;  Location: [facility] ENDO;  Service: Endoscopy;  Laterality: N/A;  rapid test >50 miles -ml    ESOPHAGOGASTRODUODENOSCOPY N/A 11/8/2022    Procedure: EGD (ESOPHAGOGASTRODUODENOSCOPY);  Surgeon: Tapan Stevenson MD;  Location: [facility] ENDO;  Service: Endoscopy;  Laterality: N/A;  w/dilation  fully vaccinated,  instructions mailed-Kpvt  11/4 pt called did not receive instructions, does not have portal or email, went over prep instructions and medication instructions with pt on the phone -LW    ESOPHAGOGASTRODUODENOSCOPY N/A 9/19/2023    Procedure: EGD (ESOPHAGOGASTRODUODENOSCOPY);  Surgeon: Miller Phillips MD;  Location: [facility] ENDO;  Service: Endoscopy;  Laterality: N/A;  botox injection  inst mailed    ESOPHAGOGASTRODUODENOSCOPY N/A 5/27/2025    Procedure: EGD (ESOPHAGOGASTRODUODENOSCOPY);  Surgeon: Nahum Cardoza MD;  Location: [facility] ENDO;  Service: Gastroenterology;  Laterality: N/A;  jm/hatmelah/mailed/pt only wants tuesday. Pt. has Achalashia EC/ Pt. is no longer on Plavix per Dr. Kay dated 3/24/25 .EC    EYE SURGERY Bilateral 06/08/2018    cataract     LEFT HEART CATHETERIZATION Left 8/13/2020    Procedure: Left heart cath, rra, noon;  Surgeon: Guevara Skelton MD;  Location: [facility] CATH LAB;  Service: Cardiology;  Laterality: Left;  RN PREOP 8/7/2020---COVID NEGATIVE ON 8/12    PORTACATH PLACEMENT Right 01/2018    sweat glands axillary regions Bilateral     TONSILLECTOMY         ALLERGIES AND MEDICATION:   Review of patient's allergies indicates:  No Known Allergies     Medication List            Accurate as of August 5, 2025  9:31 AM. If you have any questions, ask your nurse or doctor.                CONTINUE taking these medications      acetaminophen 650 MG Tbsr  Commonly known as: TYLENOL     * albuterol 90 mcg/actuation inhaler  Commonly known as: VENTOLIN HFA  Inhale 2 puffs into the lungs every 6 (six) hours as needed for Wheezing. Rescue     * albuterol 2.5 mg /3 mL (0.083 %) nebulizer solution  Commonly known as: PROVENTIL  Take 3 mLs (2.5 mg total) by nebulization every 6 (six) hours as needed for Wheezing or Shortness of Breath. Rescue     aspirin 81 MG EC tablet  Commonly known as: ECOTRIN     azelastine 137 mcg (0.1 %) nasal spray  Commonly known as: ASTELIN     diclofenac 50 MG EC tablet  Commonly  known as: VOLTAREN  TAKE 1 TABLET BY MOUTH TWICE DAILY AS NEEDED     EScitalopram oxalate 10 MG tablet  Commonly known as: LEXAPRO  Take 1 tablet (10 mg total) by mouth once daily.     fluticasone propionate 50 mcg/actuation nasal spray  Commonly known as: FLONASE  SHAKE WELL & USE TWO SPRAYS EACH NOSTRIL ONCE A DAY     isosorbide mononitrate 30 MG 24 hr tablet  Commonly known as: IMDUR  TAKE 1 TABLET BY MOUTH EVERY DAY. Hold if SBP <120     levocetirizine 5 MG tablet  Commonly known as: XYZAL  Take 1 tablet (5 mg total) by mouth every evening.     methocarbamoL 500 MG Tab  Commonly known as: Robaxin  Take 1 tablet (500 mg total) by mouth 2 (two) times daily as needed (back spasm).     montelukast 10 mg tablet  Commonly known as: SINGULAIR     nitroGLYCERIN 0.4 MG SL tablet  Commonly known as: NITROSTAT  PLACE 1 TAB UNDER TONGUE EVERY 5 MINUTES x 3 DOSES AS NEEDED FOR CHEST PAIN. IF NOT RESOLVED CALL 911     pantoprazole 40 MG tablet  Commonly known as: PROTONIX  Take 1 tablet (40 mg total) by mouth once daily.     pregabalin 75 MG capsule  Commonly known as: LYRICA     rosuvastatin 20 MG tablet  Commonly known as: CRESTOR  TAKE 1 TABLET BY MOUTH ONCE A DAY FOR CHOLESTEROL     TRELEGY ELLIPTA 200-62.5-25 mcg inhaler  Generic drug: fluticasone-umeclidin-vilanter  Inhale 1 puff into the lungs once daily.           * This list has 2 medication(s) that are the same as other medications prescribed for you. Read the directions carefully, and ask your doctor or other care provider to review them with you.                STOP taking these medications      clopidogreL 75 mg tablet  Commonly known as: PLAVIX  Stopped by: Eran Barragan MD              SOCIAL HISTORY:   Social History[1]    FAMILY HISTORY:     Family History   Problem Relation Name Age of Onset    Cancer Mother          breast    Heart disease Mother      Breast cancer Mother      Cancer Father          lung-smoker     Cancer Sister          lung-smoker      "Heart attack Sister      Cancer Maternal Grandmother      Heart disease Maternal Grandmother      Cancer Maternal Grandfather      Heart disease Maternal Grandfather      Cancer Paternal Grandmother      Cancer Paternal Grandfather      Cancer Sister          mets not sure where it started     COPD Sister      Heart disease Sister      Kidney cancer Son      Colon cancer Neg Hx      Esophageal cancer Neg Hx         REVIEW OF SYSTEMS:   Review of Systems   Constitutional:  Negative for chills, diaphoresis, fever, malaise/fatigue and weight loss.   HENT:  Negative for congestion, hearing loss, sinus pain, sore throat and tinnitus.    Eyes:  Negative for blurred vision, double vision, photophobia and pain.   Respiratory:  Positive for shortness of breath. Negative for cough, hemoptysis, sputum production, wheezing and stridor.    Cardiovascular:  Negative for chest pain, palpitations, orthopnea, claudication, leg swelling and PND.   Gastrointestinal:  Negative for abdominal pain, blood in stool, heartburn, melena, nausea and vomiting.   Musculoskeletal:  Negative for back pain, falls, joint pain, myalgias and neck pain.   Neurological:  Negative for dizziness, tingling, tremors, sensory change, speech change, focal weakness, seizures, loss of consciousness, weakness and headaches.   Endo/Heme/Allergies:  Does not bruise/bleed easily.   Psychiatric/Behavioral:  Negative for depression, memory loss and substance abuse. The patient is not nervous/anxious.        PHYSICAL EXAM:     Vitals:    08/05/25 0838   BP: 120/69   Pulse: 66   Resp: 19    Body mass index is 26.01 kg/m².  Weight: 64.5 kg (142 lb 3.2 oz)   Height: 5' 2" (157.5 cm)     Physical Exam  Vitals reviewed.   Constitutional:       General: She is not in acute distress.     Appearance: Normal appearance. She is well-developed and overweight. She is not diaphoretic.   HENT:      Head: Normocephalic.   Neck:      Vascular: No carotid bruit or JVD. "   Cardiovascular:      Rate and Rhythm: Normal rate and regular rhythm.      Pulses: Normal pulses.      Heart sounds: Murmur heard.      Crescendo decrescendo systolic murmur is present with a grade of 3/6.   Pulmonary:      Effort: Pulmonary effort is normal.      Breath sounds: Normal breath sounds.   Abdominal:      General: Bowel sounds are normal.      Palpations: Abdomen is soft.      Tenderness: There is no abdominal tenderness.   Skin:     General: Skin is warm and dry.   Neurological:      Mental Status: She is alert and oriented to person, place, and time.   Psychiatric:         Speech: Speech normal.         Behavior: Behavior normal.         Thought Content: Thought content normal.         DATA:   EKG: (personally reviewed tracing)  08/05/2025-sinus bradycardia  Results for orders placed or performed during the hospital encounter of 04/01/25   EKG 12-lead    Collection Time: 04/01/25  5:03 PM   Result Value Ref Range    QRS Duration 78 ms    [facility] QTC Calculation 458 ms    Narrative    Test Reason : R07.9,    Vent. Rate :  83 BPM     Atrial Rate :  83 BPM     P-R Int : 156 ms          QRS Dur :  78 ms      QT Int : 390 ms       P-R-T Axes :  95  62  48 degrees    QTcB Int : 458 ms    Normal sinus rhythm  Normal ECG  When compared with ECG of 12-Mar-2025 08:37,  Significant changes have occurred  Confirmed by Guevara Skelton ([id number]) on 4/3/2025 6:55:10 PM    Referred By: AAAREFERRAL SELF           Confirmed By: Guevara Skelton        Laboratory:  CBC:  Recent Labs   Lab 03/06/25  0800 04/01/25  1656 05/14/25  0820   WBC 8.63 6.79 4.66   Hemoglobin 14.3  --   --    HGB  --  14.1 13.1   Hematocrit 44.7  --   --    HCT  --  43.2 41.3   Platelet Count  --  212 187   Platelets 217  --   --        CHEMISTRIES:  Recent Labs   Lab 11/20/24  0844 01/15/25  1114 03/06/25  0800 04/01/25  1656 05/14/25  0820   Glucose 123 H  123 H 108 116 H 106 116 H   Sodium 140  140 137 137 139 139   Potassium 3.9  3.9 3.8 3.9 4.1 3.7    BUN 20  20 17 21 11 14   Creatinine 0.9  0.9 0.8 0.9 0.8 0.8   Calcium 9.3  9.3 9.5 9.6 9.8 8.8   Magnesium   --   --   --   --  1.9   Magnesium 1.7  1.7 1.7  --   --   --        CARDIAC BIOMARKERS:  Recent Labs   Lab 11/15/23  0730 05/21/24  1430 01/15/25  1114 04/01/25  1656   CPK 73  --  71  --    Troponin I  --  0.010  --   --    Troponin-I  --   --   --  <0.006       COAGS:  Recent Labs   Lab 05/21/24  1430 05/21/24  1431 05/22/24  0454   INR 1.0 2.0 H 1.0       LIPIDS/LFTS:  Recent Labs   Lab 10/17/22  0920 11/16/22  0703 11/15/23  0730 12/12/23  0814 05/21/24  1430 05/22/24  0454 03/06/25  0800 04/01/25  1656 05/14/25  0820   Cholesterol 138  --  127  --  156  --   --   --   --    Triglycerides 113  --  106  --  178 H  --   --   --   --    HDL 60  --  66  --  58  --   --   --   --    LDL Cholesterol 55.4 L  --  39.8 L  --  62.4 L  --   --   --   --    Non-HDL Cholesterol 78  --  61  --  98  --   --   --   --    AST  --    < > 17   < > 17   < > 17 21 17   ALT  --    < > 12   < > 18   < > 16 16 14    < > = values in this interval not displayed.       Hemoglobin A1C   Date Value Ref Range Status   11/20/2024 5.9 (H) 4.0 - 5.6 % Final     Comment:     ADA Screening Guidelines:  5.7-6.4%  Consistent with prediabetes  >or=6.5%  Consistent with diabetes    High levels of fetal hemoglobin interfere with the HbA1C  assay. Heterozygous hemoglobin variants (HbS, HgC, etc)do  not significantly interfere with this assay.   However, presence of multiple variants may affect accuracy.     05/22/2024 5.7 (H) 4.0 - 5.6 % Final     Comment:     ADA Screening Guidelines:  5.7-6.4%  Consistent with prediabetes  >or=6.5%  Consistent with diabetes    High levels of fetal hemoglobin interfere with the HbA1C  assay. Heterozygous hemoglobin variants (HbS, HgC, etc)do  not significantly interfere with this assay.   However, presence of multiple variants may affect accuracy.     11/15/2023 5.6 4.0 - 5.6 % Final     Comment:      ADA Screening Guidelines:  5.7-6.4%  Consistent with prediabetes  >or=6.5%  Consistent with diabetes    High levels of fetal hemoglobin interfere with the HbA1C  assay. Heterozygous hemoglobin variants (HbS, HgC, etc)do  not significantly interfere with this assay.   However, presence of multiple variants may affect accuracy.          The ASCVD Risk score (Brandie BRITO, et al., 2019) failed to calculate for the following reasons:    Risk score cannot be calculated because patient has a medical history suggesting prior/existing ASCVD      Cardiovascular Testing:    Echocardiogram 05/22/2024:      No intracardiac source of embolus noted on this exam.  If high clinical suspicion, consider MORELIA +/- bubble study.    Left Ventricle: The left ventricle is normal in size. Normal wall thickness. There is normal systolic function with a visually estimated ejection fraction of 65 - 70%. Grade I diastolic dysfunction.    Right Ventricle: Normal right ventricular cavity size. Systolic function is normal.    Left Atrium: Left atrium is mildly dilated. Agitated saline study of the atrial septum is negative after vasalva maneuver, suggesting absence of intracardiac shunt at the atrial level.    Aortic Valve: The aortic valve is a trileaflet valve. There is moderate aortic valve sclerosis. Mildly restricted motion. There is mild to moderate stenosis. Aortic valve area by VTI is 1.22 cm². Aortic valve peak velocity is 2.96 m/s. Mean gradient is 22 mmHg. The dimensionless index is 0.42.    Pulmonary Artery: The estimated pulmonary artery systolic pressure is 33 mmHg.    Echocardiogram 06/07/2025:    The left ventricle is normal in size with normal systolic function.  The estimated ejection fraction is 55%.  Mild left atrial enlargement.  Grade III left ventricular diastolic dysfunction.  Normal right ventricular size with normal right ventricular systolic function.  Mild right atrial enlargement.  Moderate mitral regurgitation.  Normal  central venous pressure (3 mmHg).  The estimated PA systolic pressure is 40 mmHg.  There is moderate pulmonary hypertension.  Normal GLS -18.7%.    Cardiac catheterization 2020:    LVEDP (Pre): 18  Estimated blood loss: <50 mL  Non-obstructive CAD.  No significant coronary artery stenosis. Luminal irregularities. Previously placed RCA stent is widely patent.    ASSESSMENT:     Bradycardia: normal TSH   Diastolic heart failure  Coronary artery disease  Aortic stenosis  Hypertension  Hyperlipidemia: LDL 62 rosuvastatin 20  Pulmonary hypertension  Aortic atherosclerosis  TIA  Overweight    PLAN:     Bradycardia: Holter.  Diastolic heart failure: Monitor.  Coronary artery disease: Stable. Continue ASA.  Aortic stenosis: Echocardiogram.  Hypertension: Continue current management.  Hyperlipidemia: Continue current management.  Pulmonary hypertension: Echocardiogram.  TIA: Continue ASA. Ok to stop clopidogrel.  Return to clinic one month.           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     Drug use: No    Sexual activity: Not Currently     Social Drivers of Health     Financial Resource Strain: Low Risk  (3/6/2025)    Overall Financial Resource Strain (CARDIA)     Difficulty of Paying Living Expenses: Not hard at all   Food Insecurity: No Food Insecurity (3/6/2025)    Hunger Vital Sign     Worried About Running Out of Food in the Last Year: Never true     Ran Out of Food in the Last Year: Never true   Transportation Needs:  No Transportation Needs (3/6/2025)    PRAPARE - Transportation     Lack of Transportation (Medical): No     Lack of Transportation (Non-Medical): No   Physical Activity: Inactive (3/6/2025)    Exercise Vital Sign     Days of Exercise per Week: 0 days     Minutes of Exercise per Session: 0 min   Stress: No Stress Concern Present (3/6/2025)    [employer] - Occupational Stress Questionnaire     Feeling of Stress : Only a little   Housing Stability: Low Risk  (3/6/2025)    Housing Stability Vital Sign     Unable to Pay for Housing in the Last Year: No     Homeless in the Last Year: No

## 2025-08-13 ENCOUNTER — INFUSION (OUTPATIENT)
Dept: INFUSION THERAPY | Facility: HOSPITAL | Age: 80
End: 2025-08-13
Attending: INTERNAL MEDICINE
Payer: MEDICARE

## 2025-08-13 ENCOUNTER — OFFICE VISIT (OUTPATIENT)
Dept: HEMATOLOGY/ONCOLOGY | Facility: CLINIC | Age: 80
End: 2025-08-13
Payer: MEDICARE

## 2025-08-13 VITALS
WEIGHT: 143.94 LBS | HEART RATE: 76 BPM | HEIGHT: 62 IN | TEMPERATURE: 98 F | DIASTOLIC BLOOD PRESSURE: 76 MMHG | SYSTOLIC BLOOD PRESSURE: 104 MMHG | OXYGEN SATURATION: 95 % | BODY MASS INDEX: 26.49 KG/M2

## 2025-08-13 DIAGNOSIS — C50.919 RECURRENT BREAST CANCER, UNSPECIFIED LATERALITY: Primary | ICD-10-CM

## 2025-08-13 DIAGNOSIS — C50.919 RECURRENT BREAST CANCER, UNSPECIFIED LATERALITY: ICD-10-CM

## 2025-08-13 DIAGNOSIS — C34.90 SQUAMOUS CELL CARCINOMA LUNG: Primary | ICD-10-CM

## 2025-08-13 DIAGNOSIS — J44.9 CHRONIC OBSTRUCTIVE PULMONARY DISEASE, UNSPECIFIED COPD TYPE: ICD-10-CM

## 2025-08-13 DIAGNOSIS — Z85.118 HISTORY OF LUNG CANCER: ICD-10-CM

## 2025-08-13 DIAGNOSIS — C50.811 MALIGNANT NEOPLASM OF OVERLAPPING SITES OF RIGHT BREAST IN FEMALE, ESTROGEN RECEPTOR NEGATIVE: ICD-10-CM

## 2025-08-13 DIAGNOSIS — J96.11 CHRONIC RESPIRATORY FAILURE WITH HYPOXIA: ICD-10-CM

## 2025-08-13 DIAGNOSIS — Z12.31 ENCOUNTER FOR SCREENING MAMMOGRAM FOR MALIGNANT NEOPLASM OF BREAST: ICD-10-CM

## 2025-08-13 DIAGNOSIS — E87.6 HYPOKALEMIA: ICD-10-CM

## 2025-08-13 DIAGNOSIS — Z17.1 MALIGNANT NEOPLASM OF OVERLAPPING SITES OF RIGHT BREAST IN FEMALE, ESTROGEN RECEPTOR NEGATIVE: ICD-10-CM

## 2025-08-13 LAB
ABSOLUTE EOSINOPHIL (OHS): 0.13 K/UL
ABSOLUTE MONOCYTE (OHS): 0.55 K/UL (ref 0.3–1)
ABSOLUTE NEUTROPHIL COUNT (OHS): 5.95 K/UL (ref 1.8–7.7)
ALBUMIN SERPL BCP-MCNC: 3.7 G/DL (ref 3.5–5.2)
ALP SERPL-CCNC: 64 UNIT/L (ref 40–150)
ALT SERPL W/O P-5'-P-CCNC: 16 UNIT/L (ref 10–44)
ANION GAP (OHS): 11 MMOL/L (ref 8–16)
AST SERPL-CCNC: 27 UNIT/L (ref 11–45)
BASOPHILS # BLD AUTO: 0.06 K/UL
BASOPHILS NFR BLD AUTO: 0.8 %
BILIRUB SERPL-MCNC: 0.4 MG/DL (ref 0.1–1)
BUN SERPL-MCNC: 12 MG/DL (ref 8–23)
CALCIUM SERPL-MCNC: 9.3 MG/DL (ref 8.7–10.5)
CHLORIDE SERPL-SCNC: 102 MMOL/L (ref 95–110)
CO2 SERPL-SCNC: 28 MMOL/L (ref 23–29)
CREAT SERPL-MCNC: 0.7 MG/DL (ref 0.5–1.4)
ERYTHROCYTE [DISTWIDTH] IN BLOOD BY AUTOMATED COUNT: 14.4 % (ref 11.5–14.5)
GFR SERPLBLD CREATININE-BSD FMLA CKD-EPI: >60 ML/MIN/1.73/M2
GLUCOSE SERPL-MCNC: 118 MG/DL (ref 70–110)
HCT VFR BLD AUTO: 42.1 % (ref 37–48.5)
HGB BLD-MCNC: 13.2 GM/DL (ref 12–16)
IMM GRANULOCYTES # BLD AUTO: 0.05 K/UL (ref 0–0.04)
IMM GRANULOCYTES NFR BLD AUTO: 0.6 % (ref 0–0.5)
LYMPHOCYTES # BLD AUTO: 1.07 K/UL (ref 1–4.8)
MAGNESIUM SERPL-MCNC: 1.7 MG/DL (ref 1.6–2.6)
MCH RBC QN AUTO: 27.9 PG (ref 27–31)
MCHC RBC AUTO-ENTMCNC: 31.4 G/DL (ref 32–36)
MCV RBC AUTO: 89 FL (ref 82–98)
NUCLEATED RBC (/100WBC) (OHS): 0 /100 WBC
PLATELET # BLD AUTO: 257 K/UL (ref 150–450)
PMV BLD AUTO: 9.4 FL (ref 9.2–12.9)
POTASSIUM SERPL-SCNC: 3.3 MMOL/L (ref 3.5–5.1)
PROT SERPL-MCNC: 7.1 GM/DL (ref 6–8.4)
RBC # BLD AUTO: 4.73 M/UL (ref 4–5.4)
RELATIVE EOSINOPHIL (OHS): 1.7 %
RELATIVE LYMPHOCYTE (OHS): 13.7 % (ref 18–48)
RELATIVE MONOCYTE (OHS): 7 % (ref 4–15)
RELATIVE NEUTROPHIL (OHS): 76.2 % (ref 38–73)
SODIUM SERPL-SCNC: 141 MMOL/L (ref 136–145)
WBC # BLD AUTO: 7.81 K/UL (ref 3.9–12.7)

## 2025-08-13 PROCEDURE — 36591 DRAW BLOOD OFF VENOUS DEVICE: CPT

## 2025-08-13 PROCEDURE — 85025 COMPLETE CBC W/AUTO DIFF WBC: CPT

## 2025-08-13 PROCEDURE — 99999 PR PBB SHADOW E&M-EST. PATIENT-LVL V: CPT | Mod: PBBFAC,,, | Performed by: INTERNAL MEDICINE

## 2025-08-13 PROCEDURE — 83735 ASSAY OF MAGNESIUM: CPT

## 2025-08-13 PROCEDURE — 63600175 PHARM REV CODE 636 W HCPCS: Performed by: INTERNAL MEDICINE

## 2025-08-13 PROCEDURE — 80053 COMPREHEN METABOLIC PANEL: CPT

## 2025-08-13 RX ORDER — HEPARIN 100 UNIT/ML
500 SYRINGE INTRAVENOUS
OUTPATIENT
Start: 2025-08-13

## 2025-08-13 RX ORDER — HEPARIN 100 UNIT/ML
500 SYRINGE INTRAVENOUS
Status: DISCONTINUED | OUTPATIENT
Start: 2025-08-13 | End: 2025-08-13 | Stop reason: HOSPADM

## 2025-08-13 RX ORDER — POTASSIUM CHLORIDE 20 MEQ/1
20 TABLET, EXTENDED RELEASE ORAL DAILY
Qty: 4 TABLET | Refills: 0 | Status: SHIPPED | OUTPATIENT
Start: 2025-08-13

## 2025-08-13 RX ORDER — SODIUM CHLORIDE 0.9 % (FLUSH) 0.9 %
10 SYRINGE (ML) INJECTION
OUTPATIENT
Start: 2025-08-13

## 2025-08-13 RX ADMIN — HEPARIN 500 UNITS: 100 SYRINGE at 08:08

## 2025-08-26 ENCOUNTER — HOSPITAL ENCOUNTER (OUTPATIENT)
Dept: RADIOLOGY | Facility: HOSPITAL | Age: 80
Discharge: HOME | End: 2025-08-26
Attending: INTERNAL MEDICINE
Payer: MEDICARE

## 2025-08-26 VITALS — HEIGHT: 62 IN | BODY MASS INDEX: 26.49 KG/M2 | WEIGHT: 143.94 LBS

## 2025-08-26 DIAGNOSIS — C50.919 RECURRENT BREAST CANCER, UNSPECIFIED LATERALITY: ICD-10-CM

## 2025-08-26 DIAGNOSIS — Z12.31 ENCOUNTER FOR SCREENING MAMMOGRAM FOR MALIGNANT NEOPLASM OF BREAST: ICD-10-CM

## 2025-08-26 PROCEDURE — 77067 SCR MAMMO BI INCL CAD: CPT | Mod: TC,HCNC

## 2025-09-02 ENCOUNTER — INFUSION (OUTPATIENT)
Dept: INFUSION THERAPY | Facility: HOSPITAL | Age: 80
End: 2025-09-02
Attending: INTERNAL MEDICINE
Payer: MEDICARE

## 2025-09-02 ENCOUNTER — HOSPITAL ENCOUNTER (OUTPATIENT)
Dept: CARDIOLOGY | Facility: HOSPITAL | Age: 80
Discharge: HOME | End: 2025-09-02
Attending: INTERNAL MEDICINE
Payer: MEDICARE

## 2025-09-02 VITALS
HEART RATE: 67 BPM | SYSTOLIC BLOOD PRESSURE: 174 MMHG | DIASTOLIC BLOOD PRESSURE: 81 MMHG | TEMPERATURE: 98 F | HEIGHT: 62 IN | BODY MASS INDEX: 26.31 KG/M2 | OXYGEN SATURATION: 94 % | RESPIRATION RATE: 18 BRPM | WEIGHT: 143 LBS

## 2025-09-02 DIAGNOSIS — I25.118 CORONARY ARTERY DISEASE OF NATIVE ARTERY OF NATIVE HEART WITH STABLE ANGINA PECTORIS: Chronic | ICD-10-CM

## 2025-09-02 DIAGNOSIS — C34.90 SQUAMOUS CELL CARCINOMA LUNG: Primary | ICD-10-CM

## 2025-09-02 DIAGNOSIS — R00.1 BRADYCARDIA: ICD-10-CM

## 2025-09-02 DIAGNOSIS — I10 PRIMARY HYPERTENSION: Chronic | ICD-10-CM

## 2025-09-02 DIAGNOSIS — I35.0 NONRHEUMATIC AORTIC VALVE STENOSIS: ICD-10-CM

## 2025-09-02 DIAGNOSIS — I50.32 CHRONIC HEART FAILURE WITH PRESERVED EJECTION FRACTION: ICD-10-CM

## 2025-09-02 LAB
AORTIC ROOT ANNULUS: 3 CM
AORTIC SIZE INDEX (SOV): 1.9 CM/M2
AORTIC VALVE CUSP SEPERATION: 0.89 CM
AV INDEX (PROSTH): 0.27
AV MEAN GRADIENT: 18 MMHG
AV PEAK GRADIENT: 27 MMHG
AV VALVE AREA BY VELOCITY RATIO: 0.8 CM²
AV VALVE AREA: 0.7 CM²
AV VELOCITY RATIO: 0.31
BSA FOR ECHO PROCEDURE: 1.68 M2
CV ECHO LV RWT: 0.49 CM
DOP CALC AO PEAK VEL: 2.6 M/S
DOP CALC AO VTI: 77.6 CM
DOP CALC LVOT AREA: 2.5 CM2
DOP CALC LVOT DIAMETER: 1.8 CM
DOP CALC LVOT PEAK VEL: 0.8 M/S
DOP CALCLVOT PEAK VEL VTI: 21.3 CM
E WAVE DECELERATION TIME: 194 MSEC
E/A RATIO: 1.44
E/E' RATIO: 18 M/S
ECHO LV POSTERIOR WALL: 1 CM (ref 0.6–1.1)
FRACTIONAL SHORTENING: 31.7 % (ref 28–44)
INTERVENTRICULAR SEPTUM: 1.2 CM (ref 0.6–1.1)
IVC DIAMETER: 1.51 CM
IVRT: 124 MSEC
LA MAJOR: 4.2 CM
LA MINOR: 4.9 CM
LA WIDTH: 4 CM
LEFT ATRIUM SIZE: 4.1 CM
LEFT ATRIUM VOLUME INDEX: 38 ML/M2
LEFT ATRIUM VOLUME: 63 CM3
LEFT INTERNAL DIMENSION IN SYSTOLE: 2.8 CM (ref 2.1–4)
LEFT VENTRICLE DIASTOLIC VOLUME INDEX: 45.78 ML/M2
LEFT VENTRICLE DIASTOLIC VOLUME: 76 ML
LEFT VENTRICLE MASS INDEX: 91.1 G/M2
LEFT VENTRICLE SYSTOLIC VOLUME INDEX: 18.1 ML/M2
LEFT VENTRICLE SYSTOLIC VOLUME: 30 ML
LEFT VENTRICULAR INTERNAL DIMENSION IN DIASTOLE: 4.1 CM (ref 3.5–6)
LEFT VENTRICULAR MASS: 151.3 G
LV LATERAL E/E' RATIO: 17.5 M/S
LV SEPTAL E/E' RATIO: 17.5 M/S
LVED V (TEICH): 75.69 ML
LVES V (TEICH): 29.66 ML
LVOT MG: 1.45 MMHG
LVOT MV: 0.57 CM/S
Lab: 2 CM/M
MV PEAK A VEL: 0.73 M/S
MV PEAK E VEL: 1.05 M/S
MV STENOSIS PRESSURE HALF TIME: 56.2 MS
MV VALVE AREA P 1/2 METHOD: 3.91 CM2
OHS CV CPX PATIENT HEIGHT IN: 62
OHS CV RV/LV RATIO: 0.93 CM
PISA TR MAX VEL: 3 M/S
PULM VEIN S/D RATIO: 0.88
PV MV: 0.87 M/S
PV PEAK D VEL: 0.77 M/S
PV PEAK GRADIENT: 6 MMHG
PV PEAK S VEL: 0.68 M/S
PV PEAK VELOCITY: 1.21 M/S
RA MAJOR: 4.44 CM
RA PRESSURE ESTIMATED: 3 MMHG
RA WIDTH: 3.3 CM
RIGHT VENTRICLE DIASTOLIC BASEL DIMENSION: 3.8 CM
RIGHT VENTRICULAR END-DIASTOLIC DIMENSION: 3.83 CM
RV TB RVSP: 6 MMHG
RV TISSUE DOPPLER FREE WALL SYSTOLIC VELOCITY 1 (APICAL 4 CHAMBER VIEW): 12.3 CM/S
SINUS: 3.1 CM
STJ: 2.4 CM
TDI LATERAL: 0.06 M/S
TDI SEPTAL: 0.06 M/S
TDI: 0.06 M/S
TR MAX PG: 36 MMHG
TRICUSPID ANNULAR PLANE SYSTOLIC EXCURSION: 2 CM
TV REST PULMONARY ARTERY PRESSURE: 39 MMHG
Z-SCORE OF LEFT VENTRICULAR DIMENSION IN END DIASTOLE: -1.26
Z-SCORE OF LEFT VENTRICULAR DIMENSION IN END SYSTOLE: -0.23

## 2025-09-02 PROCEDURE — 93306 TTE W/DOPPLER COMPLETE: CPT | Mod: HCNC

## 2025-09-02 PROCEDURE — 93306 TTE W/DOPPLER COMPLETE: CPT | Mod: 26,HCNC,, | Performed by: INTERNAL MEDICINE

## 2025-09-02 PROCEDURE — 63600175 PHARM REV CODE 636 W HCPCS: Mod: HCNC | Performed by: INTERNAL MEDICINE

## 2025-09-02 PROCEDURE — 96523 IRRIG DRUG DELIVERY DEVICE: CPT | Mod: HCNC

## 2025-09-02 PROCEDURE — 93226 XTRNL ECG REC<48 HR SCAN A/R: CPT | Mod: HCNC

## 2025-09-02 PROCEDURE — 93227 XTRNL ECG REC<48 HR R&I: CPT | Mod: HCNC,,, | Performed by: INTERNAL MEDICINE

## 2025-09-02 RX ORDER — HEPARIN 100 UNIT/ML
500 SYRINGE INTRAVENOUS
Status: DISCONTINUED | OUTPATIENT
Start: 2025-09-02 | End: 2025-09-02 | Stop reason: HOSPADM

## 2025-09-02 RX ORDER — SODIUM CHLORIDE 0.9 % (FLUSH) 0.9 %
10 SYRINGE (ML) INJECTION
OUTPATIENT
Start: 2025-09-02

## 2025-09-02 RX ORDER — HEPARIN 100 UNIT/ML
500 SYRINGE INTRAVENOUS
OUTPATIENT
Start: 2025-09-02

## 2025-09-02 RX ADMIN — HEPARIN 500 UNITS: 100 SYRINGE at 08:09

## 2025-09-04 LAB
OHS CV CPX PATIENT HEIGHT IN: 62
OHS CV EVENT MONITOR DAY: 2
OHS CV HOLTER LENGTH DECIMAL HOURS: 96
OHS CV HOLTER LENGTH HOURS: 48
OHS CV HOLTER LENGTH MINUTES: 0
OHS CV HOLTER SINUS AVERAGE HR: 63
OHS CV HOLTER SINUS MAX HR: 103
OHS CV HOLTER SINUS MIN HR: 49

## (undated) DEVICE — KIT HAND CONTROL HIGH PRESSUR

## (undated) DEVICE — CATH DXTERITY JL35 100CM 5FR

## (undated) DEVICE — KIT SYR REUSABLE

## (undated) DEVICE — KIT MANIFOLD LOW PRESS TUBING

## (undated) DEVICE — PAD RADI FEMORAL

## (undated) DEVICE — KIT GLIDESHEATH SLEND 6FR 10CM

## (undated) DEVICE — PAD DEFIB CADENCE ADULT R2

## (undated) DEVICE — CATH DXTERITY JR40 100CM 5FR

## (undated) DEVICE — OMNIPAQUE 300MG 150ML VIAL

## (undated) DEVICE — PACK CATH LAB

## (undated) DEVICE — WIRE GUIDE SAFE-T-J .035 260CM

## (undated) DEVICE — CATH EMPULSE ANGLED 5FR PIGTAI